# Patient Record
Sex: MALE | Race: WHITE | NOT HISPANIC OR LATINO | Employment: OTHER | URBAN - METROPOLITAN AREA
[De-identification: names, ages, dates, MRNs, and addresses within clinical notes are randomized per-mention and may not be internally consistent; named-entity substitution may affect disease eponyms.]

---

## 2018-05-11 LAB
ANION GAP SERPL CALCULATED.3IONS-SCNC: 13.7 MM/L
BASOPHILS # BLD AUTO: 0 X3/UL (ref 0–0.3)
BASOPHILS # BLD AUTO: 0.6 % (ref 0–2)
BUN SERPL-MCNC: 17 MG/DL (ref 7–25)
CALCIUM SERPL-MCNC: 9.2 MG/DL (ref 8.6–10.5)
CHLORIDE SERPL-SCNC: 103 MM/L (ref 98–107)
CHOLEST SERPL-MCNC: 169 MG/DL (ref 0–200)
CO2 SERPL-SCNC: 26 MM/L (ref 21–31)
CREAT SERPL-MCNC: 0.84 MG/DL (ref 0.7–1.3)
DEPRECATED RDW RBC AUTO: 13.9 %
EGFR (HISTORICAL): > 60 GFR
EGFR AFRICAN AMERICAN (HISTORICAL): > 60 GFR
EOSINOPHIL # BLD AUTO: 0.2 X3/UL (ref 0–0.5)
EOSINOPHIL NFR BLD AUTO: 3.5 % (ref 0–5)
GLUCOSE (HISTORICAL): 103 MG/DL (ref 65–99)
HCT VFR BLD AUTO: 46.2 % (ref 42–52)
HDLC SERPL-MCNC: 58 MG/DL (ref 40–60)
HGB BLD-MCNC: 15.3 G/DL (ref 14–18)
LDLC SERPL CALC-MCNC: 99.9 MG/DL (ref 75–193)
LYMPHOCYTES # BLD AUTO: 1.2 X3/UL (ref 1.2–4.2)
LYMPHOCYTES NFR BLD AUTO: 23.2 % (ref 20.5–51.1)
MCH RBC QN AUTO: 32.5 PG (ref 26–34)
MCHC RBC AUTO-ENTMCNC: 33.1 G/DL (ref 31–37)
MCV RBC AUTO: 98 FL (ref 81–99)
MONOCYTES # BLD AUTO: 0.6 X3/UL (ref 0–1)
MONOCYTES NFR BLD AUTO: 12.7 % (ref 1.7–12)
NEUTROPHILS # BLD AUTO: 3.1 X3/UL (ref 1.4–6.5)
NEUTS SEG NFR BLD AUTO: 60 % (ref 42.2–75.2)
OSMOLALITY, SERUM (HISTORICAL): 277 MOSM (ref 262–291)
PLATELET # BLD AUTO: 299 X3/UL (ref 130–400)
PMV BLD AUTO: 8.1 FL
POTASSIUM SERPL-SCNC: 4.7 MM/L (ref 3.5–5.5)
RBC # BLD AUTO: 4.71 X6/UL (ref 4.3–5.9)
SODIUM SERPL-SCNC: 138 MM/L (ref 134–143)
TRIGL SERPL-MCNC: 57 MG/DL (ref 44–166)
VLDL CHOLESTEROL (HISTORICAL): 11 MG/DL (ref 5–51)
WBC # BLD AUTO: 5.1 X3/UL (ref 4.8–10.8)

## 2018-07-12 PROBLEM — R03.0 BORDERLINE HYPERTENSION: Status: ACTIVE | Noted: 2018-07-12

## 2018-07-12 PROBLEM — E78.5 HYPERLIPIDEMIA: Status: ACTIVE | Noted: 2018-07-12

## 2018-07-12 RX ORDER — SIMVASTATIN 20 MG
20 TABLET ORAL DAILY
COMMUNITY
End: 2021-01-27

## 2018-07-12 RX ORDER — MULTIVITAMIN
TABLET ORAL DAILY
COMMUNITY

## 2018-08-28 ENCOUNTER — OFFICE VISIT (OUTPATIENT)
Dept: FAMILY MEDICINE CLINIC | Facility: CLINIC | Age: 78
End: 2018-08-28
Payer: MEDICARE

## 2018-08-28 VITALS
RESPIRATION RATE: 16 BRPM | OXYGEN SATURATION: 99 % | SYSTOLIC BLOOD PRESSURE: 132 MMHG | BODY MASS INDEX: 26.74 KG/M2 | TEMPERATURE: 98.7 F | HEART RATE: 69 BPM | WEIGHT: 191 LBS | HEIGHT: 71 IN | DIASTOLIC BLOOD PRESSURE: 64 MMHG

## 2018-08-28 DIAGNOSIS — E78.5 HYPERLIPIDEMIA, UNSPECIFIED HYPERLIPIDEMIA TYPE: ICD-10-CM

## 2018-08-28 DIAGNOSIS — R03.0 BORDERLINE HYPERTENSION: Primary | ICD-10-CM

## 2018-08-28 PROCEDURE — 99213 OFFICE O/P EST LOW 20 MIN: CPT | Performed by: INTERNAL MEDICINE

## 2018-08-28 NOTE — PROGRESS NOTES
Assessment/Plan:  Hyperlipidemia on statin therapy  2   Borderline hypertension stable on no medication  3   Hearing deficit  Plan  Continue Zocor 20 once daily and multivitamins  Check lab data before next visit in 3         Diagnoses and all orders for this visit:    Borderline hypertension    Hyperlipidemia, unspecified hyperlipidemia type          Subjective:      Patient ID: Chandana Lao is a 77 y o  male  59-year-old male with history of borderline hypertension on no medications, history of hyperlipidemia on statin therapy  Patient comes for routine scheduled visit has no new complaints at this time  Denies any chest pain dyspnea palpitation no nausea no vomiting  The following portions of the patient's history were reviewed and updated as appropriate: He  has a past medical history of Hyperlipidemia  Patient Active Problem List    Diagnosis Date Noted    Hyperlipidemia 07/12/2018    Borderline hypertension 07/12/2018     He  has a past surgical history that includes Hernia repair  His family history is not on file  He  reports that he has quit smoking  He has never used smokeless tobacco  He reports that he drinks alcohol  He reports that he does not use drugs  Current Outpatient Prescriptions   Medication Sig Dispense Refill    Multiple Vitamin (MULTIVITAMIN) tablet Take by mouth daily      simvastatin (ZOCOR) 20 mg tablet Take 20 mg by mouth daily       No current facility-administered medications for this visit  Current Outpatient Prescriptions on File Prior to Visit   Medication Sig    Multiple Vitamin (MULTIVITAMIN) tablet Take by mouth daily    simvastatin (ZOCOR) 20 mg tablet Take 20 mg by mouth daily     No current facility-administered medications on file prior to visit  He has No Known Allergies       Review of Systems   Constitutional: Negative  HENT: Negative  Eyes: Negative  Respiratory: Negative  Cardiovascular: Negative      Gastrointestinal: Negative  Endocrine: Negative  Genitourinary: Negative  Musculoskeletal: Negative  Skin: Negative  Allergic/Immunologic: Negative  Neurological: Negative  Hematological: Negative  Psychiatric/Behavioral: Negative  Objective:      /64   Pulse 69   Temp 98 7 °F (37 1 °C) (Tympanic)   Resp 16   Ht 5' 11" (1 803 m)   Wt 86 6 kg (191 lb)   SpO2 99%   BMI 26 64 kg/m²          Physical Exam   Constitutional: He is oriented to person, place, and time  He appears well-developed and well-nourished  HENT:   Head: Normocephalic  Eyes: Pupils are equal, round, and reactive to light  Neck: Normal range of motion  Neck supple  No JVD present  No tracheal deviation present  No thyromegaly present  Cardiovascular: Normal rate, regular rhythm, normal heart sounds and intact distal pulses  Exam reveals no gallop and no friction rub  No murmur heard  Pulmonary/Chest: Effort normal and breath sounds normal    Abdominal: Soft  Bowel sounds are normal    Musculoskeletal: Normal range of motion  Neurological: He is alert and oriented to person, place, and time  Skin: Skin is warm           Orders Only on 05/11/2018   Component Date Value Ref Range Status    WBC 05/11/2018 5 1  4 8 - 10 8 X3/UL Final    RBC 05/11/2018 4 71  4 3 - 5 9 X6/UL Final    Hemoglobin 05/11/2018 15 3  14 0 - 18 0 G/DL Final    Hematocrit 05/11/2018 46 2  42 - 52 % Final    MCV 05/11/2018 98 0  81 - 99 FL Final    MCH 05/11/2018 32 5  26 - 34 PG Final    MCHC 05/11/2018 33 1  31 - 37 G/DL Final    RDW 05/11/2018 13 9  % Final    Platelets 28/86/4141 299  130 - 400 X3/UL Final    MPV 05/11/2018 8 1  FL Final    Neutrophils Relative 05/11/2018 60 0  42 2 - 75 2 % Final    Lymphocytes Relative 05/11/2018 23 2  20 5 - 51 1 % Final    Monocytes Relative 05/11/2018 12 7* 1 7 - 12 0 % Final    Eosinophils Relative 05/11/2018 3 5  0 0 - 5 0 % Final    Basophils Relative 05/11/2018 0 6  0 0 - 2 0 % Final    Neutrophils Absolute 05/11/2018 3 1  1 4 - 6 5 X3/UL Final    Lymphocytes Absolute 05/11/2018 1 2  1 2 - 4 2 X3/UL Final    Monocytes Absolute 05/11/2018 0 6  0 0 - 1 0 X3/UL Final    Eosinophils Absolute 05/11/2018 0 2  0 0 - 0 5 X3/UL Final    Basophils Absolute 05/11/2018 0 0  0 0 - 0 3 X3/UL Final    Glucose 05/11/2018 103* 65 - 99 MG/DL Final    Comment: ADA fasting glucose recommendations:   Normal: 65-99; Impaired: 100-125;  Diagnostic for Diabetes mellitus: > 125; Target for Diabetes mellitus:       BUN 05/11/2018 17  7 - 25 MG/DL Final    Creatinine 05/11/2018 0 84  0 7 - 1 3 MG/DL Final    Comment: IDMS method performed  The drugs Metamizole, Sulfasalazine, and Sulfapyridine may interfere and  result in false low results   Sodium 05/11/2018 138  134 - 143 MM/L Final    Please note: Reference range change based on patient population   Potassium 05/11/2018 4 7  3 5 - 5 5 MM/L Final    Please note: Reference range change based on patient population   Chloride 05/11/2018 103  98 - 107 MM/L Final    CO2 05/11/2018 26  21 0 - 31 0 MM/L Final    Calcium 05/11/2018 9 2  8 6 - 10 5 MG/DL Final    Please note: Reference range change based on patient population   Anion Gap 05/11/2018 13 7  MM/L Final    OSMOLALITY, SERUM 05/11/2018 277  262 - 291 mOsm Final    EGFR (HISTORICAL) 05/11/2018 > 60  >60 GFR Final    Comment: This calculation follows the MDRD guidelines  Units are in mL/min/1 73 sq meters      eGFR African American 05/11/2018 > 60  >60 GFR Final    Cholesterol 05/11/2018 169  0 - 200 MG/DL Final    Comment: <200----------------------------DESIRABLE  200 - 239---------------------BORDERLINE HIGH  240 OR GREATER-----HIGH  The drugs Metamizole, Sulfasalazine, and Sulfapyridine may interfere and  result in false low results        Triglycerides 05/11/2018 57  44 - 166 MG/DL Final Comment: The drugs Metamizole, Sulfasalazine, and Sulfapyridine may interfere and  result in false low results   VLDL CHOLESTEROL 05/11/2018 11 0  5 - 51 MG/DL Final    HDL 05/11/2018 58  40 - 60 MG/DL Final    Comment: The drugs Metamizole, Sulfasalazine, and Sulfapyridine may interfere and  result in false low results   LDL Calculated 05/11/2018 99 9  75 - 193 MG/DL Final       No results found

## 2018-09-20 ENCOUNTER — CLINICAL SUPPORT (OUTPATIENT)
Dept: FAMILY MEDICINE CLINIC | Facility: CLINIC | Age: 78
End: 2018-09-20
Payer: MEDICARE

## 2018-09-20 DIAGNOSIS — Z23 NEEDS FLU SHOT: Primary | ICD-10-CM

## 2018-09-20 PROCEDURE — 90662 IIV NO PRSV INCREASED AG IM: CPT

## 2018-09-20 PROCEDURE — G0008 ADMIN INFLUENZA VIRUS VAC: HCPCS

## 2018-12-20 ENCOUNTER — OFFICE VISIT (OUTPATIENT)
Dept: FAMILY MEDICINE CLINIC | Facility: CLINIC | Age: 78
End: 2018-12-20
Payer: MEDICARE

## 2018-12-20 VITALS
HEART RATE: 78 BPM | HEIGHT: 71 IN | OXYGEN SATURATION: 99 % | WEIGHT: 192 LBS | SYSTOLIC BLOOD PRESSURE: 120 MMHG | RESPIRATION RATE: 16 BRPM | DIASTOLIC BLOOD PRESSURE: 80 MMHG | BODY MASS INDEX: 26.88 KG/M2 | TEMPERATURE: 98 F

## 2018-12-20 DIAGNOSIS — J01.00 ACUTE MAXILLARY SINUSITIS, RECURRENCE NOT SPECIFIED: Primary | ICD-10-CM

## 2018-12-20 DIAGNOSIS — E66.3 OVERWEIGHT (BMI 25.0-29.9): ICD-10-CM

## 2018-12-20 PROCEDURE — 99213 OFFICE O/P EST LOW 20 MIN: CPT | Performed by: NURSE PRACTITIONER

## 2018-12-20 RX ORDER — DEXTROMETHORPHAN HYDROBROMIDE AND PROMETHAZINE HYDROCHLORIDE 15; 6.25 MG/5ML; MG/5ML
5 SYRUP ORAL 4 TIMES DAILY PRN
Qty: 240 ML | Refills: 0 | Status: SHIPPED | OUTPATIENT
Start: 2018-12-20 | End: 2019-01-15

## 2018-12-20 RX ORDER — AZITHROMYCIN 250 MG/1
TABLET, FILM COATED ORAL
Qty: 6 TABLET | Refills: 0 | Status: SHIPPED | OUTPATIENT
Start: 2018-12-20 | End: 2018-12-24

## 2018-12-20 NOTE — PATIENT INSTRUCTIONS
Discussed viral vs bacterial infection  Zithromax and Promethazine DM as directed  Call or return for problems/concerns

## 2018-12-20 NOTE — PROGRESS NOTES
[facility]        NAME: Sheldon Nunez is a 77 y o  male  : 1940    MRN: [id number]  DATE: 2019  TIME: 12:22 PM    Assessment and Plan   Acute maxillary sinusitis, recurrence not specified [J01 00]  1  Acute maxillary sinusitis, recurrence not specified  azithromycin (ZITHROMAX) 250 mg tablet    DISCONTINUED: promethazine-dextromethorphan (PHENERGAN-DM) 6 25-15 mg/5 mL oral syrup   2  Overweight (BMI 25 0-29  9)           Patient Instructions     Patient Instructions   Discussed viral vs bacterial infection  Zithromax and Promethazine DM as directed  Call or return for problems/concerns              Chief Complaint     Chief Complaint   Patient presents with    Cold Like Symptoms         History of Present Illness       Cold symptoms- sinus and chest congestion, denies ear pain, sore throat, and fever  Symptoms x 1 week, now worse        Review of Systems   Review of Systems   Constitutional: Negative for activity change, chills, fatigue and fever  HENT: Positive for congestion, postnasal drip, rhinorrhea and sinus pressure  Negative for ear pain and sore throat  Eyes: Negative for pain, discharge and redness  Respiratory: Positive for cough  Negative for wheezing  Cardiovascular: Negative for chest pain  Gastrointestinal: Negative for constipation, diarrhea, nausea and vomiting  Musculoskeletal: Negative for myalgias  Skin: Negative for rash  Neurological: Positive for dizziness  Negative for headaches           Current Medications       Current Outpatient Medications:     ciprofloxacin-dexamethasone (CIPRODEX) otic suspension, Administer 4 drops to the right ear 2 (two) times a day for 7 days, Disp: 3 mL, Rfl: 0    Multiple Vitamin (MULTIVITAMIN) tablet, Take by mouth daily, Disp: , Rfl:     Multiple Vitamins-Minerals (PRESERVISION AREDS 2 PO), Take by mouth, Disp: , Rfl:     simvastatin (ZOCOR) 20 mg tablet, Take 20 mg by mouth daily, Disp: , Rfl: Current Allergies     Allergies as of 12/20/2018    (No Known Allergies)            The following portions of the patient's history were reviewed and updated as appropriate: allergies, current medications, past family history, past medical history, past social history, past surgical history and problem list      Past Medical History:   Diagnosis Date    Encounter for general adult medical examination without abnormal findings 3/20/2019    Hyperlipidemia        Past Surgical History:   Procedure Laterality Date    HERNIA REPAIR         History reviewed  No pertinent family history  Medications have been verified  Objective   /80 (BP Location: Right arm, Patient Position: Sitting, Cuff Size: Adult)   Pulse 78   Temp 98 °F (36 7 °C) (Tympanic)   Resp 16   Ht 5' 11" (1 803 m)   Wt 87 1 kg (192 lb)   SpO2 99%   BMI 26 78 kg/m²        Physical Exam     Physical Exam   Constitutional: He is oriented to person, place, and time  He appears well-developed and well-nourished  No distress  HENT:   Head: Normocephalic and atraumatic  Right Ear: Tympanic membrane, external ear and ear canal normal    Left Ear: Tympanic membrane, external ear and ear canal normal    Nose: Rhinorrhea present  Mouth/Throat: Uvula is midline, oropharynx is clear and moist and mucous membranes are normal  No oropharyngeal exudate  Eyes: Pupils are equal, round, and reactive to light  Conjunctivae and EOM are normal  Right eye exhibits no discharge  Left eye exhibits no discharge  Cardiovascular: Normal rate, regular rhythm and normal heart sounds  No murmur heard  Pulmonary/Chest: Effort normal and breath sounds normal  No respiratory distress  He has no wheezes  Musculoskeletal: Normal range of motion  Neurological: He is alert and oriented to person, place, and time  Skin: Skin is warm and dry  He is not diaphoretic  Psychiatric: He has a normal mood and affect   His behavior is normal  Judgment and thought content normal    Nursing note and vitals reviewed  BMI Counseling: Body mass index is 26 78 kg/m²  Discussed the patient's BMI with him  The BMI is above average  No BMI follow-up plan is appropriate  Patient is 71 years old and weight reduction/weight gain would further complicate their underlying physical disability

## 2019-01-03 ENCOUNTER — APPOINTMENT (OUTPATIENT)
Dept: LAB | Facility: HOSPITAL | Age: 79
End: 2019-01-03
Attending: INTERNAL MEDICINE
Payer: MEDICARE

## 2019-01-03 ENCOUNTER — TRANSCRIBE ORDERS (OUTPATIENT)
Dept: ADMINISTRATIVE | Facility: HOSPITAL | Age: 79
End: 2019-01-03

## 2019-01-03 DIAGNOSIS — E78.5 HYPERLIPIDEMIA, UNSPECIFIED HYPERLIPIDEMIA TYPE: ICD-10-CM

## 2019-01-03 LAB
ALBUMIN SERPL BCP-MCNC: 4.1 G/DL (ref 3.5–5.7)
ALP SERPL-CCNC: 72 U/L (ref 55–165)
ALT SERPL W P-5'-P-CCNC: 20 U/L (ref 7–52)
ANION GAP SERPL CALCULATED.3IONS-SCNC: 9 MMOL/L (ref 4–13)
AST SERPL W P-5'-P-CCNC: 18 U/L (ref 13–39)
BASOPHILS # BLD AUTO: 0 THOUSANDS/ΜL (ref 0–0.1)
BASOPHILS NFR BLD AUTO: 1 % (ref 0–1)
BILIRUB SERPL-MCNC: 0.6 MG/DL (ref 0.2–1)
BUN SERPL-MCNC: 18 MG/DL (ref 7–25)
CALCIUM SERPL-MCNC: 9.1 MG/DL (ref 8.6–10.5)
CHLORIDE SERPL-SCNC: 104 MMOL/L (ref 98–107)
CHOLEST SERPL-MCNC: 190 MG/DL (ref 0–200)
CO2 SERPL-SCNC: 26 MMOL/L (ref 21–31)
CREAT SERPL-MCNC: 0.87 MG/DL (ref 0.7–1.3)
EOSINOPHIL # BLD AUTO: 0.2 THOUSAND/ΜL (ref 0–0.61)
EOSINOPHIL NFR BLD AUTO: 3 % (ref 0–6)
ERYTHROCYTE [DISTWIDTH] IN BLOOD BY AUTOMATED COUNT: 13.4 % (ref 11.6–15.1)
GFR SERPL CREATININE-BSD FRML MDRD: 82 ML/MIN/1.73SQ M
GLUCOSE P FAST SERPL-MCNC: 105 MG/DL (ref 65–99)
HCT VFR BLD AUTO: 45.8 % (ref 42–52)
HDLC SERPL-MCNC: 59 MG/DL (ref 40–60)
HGB BLD-MCNC: 15.4 G/DL (ref 12–17)
LDLC SERPL CALC-MCNC: 120 MG/DL (ref 0–100)
LYMPHOCYTES # BLD AUTO: 1.2 THOUSANDS/ΜL (ref 0.6–4.47)
LYMPHOCYTES NFR BLD AUTO: 22 % (ref 14–44)
MCH RBC QN AUTO: 32.7 PG (ref 26.8–34.3)
MCHC RBC AUTO-ENTMCNC: 33.7 G/DL (ref 31.4–37.4)
MCV RBC AUTO: 97 FL (ref 82–98)
MONOCYTES # BLD AUTO: 0.6 THOUSAND/ΜL (ref 0.17–1.22)
MONOCYTES NFR BLD AUTO: 12 % (ref 4–12)
NEUTROPHILS # BLD AUTO: 3.5 THOUSANDS/ΜL (ref 1.85–7.62)
NEUTS SEG NFR BLD AUTO: 63 % (ref 43–75)
NONHDLC SERPL-MCNC: 131 MG/DL
NRBC BLD AUTO-RTO: 0 /100 WBCS
PLATELET # BLD AUTO: 326 THOUSANDS/UL (ref 149–390)
PMV BLD AUTO: 7.8 FL (ref 8.9–12.7)
POTASSIUM SERPL-SCNC: 4.5 MMOL/L (ref 3.5–5.5)
PROT SERPL-MCNC: 7 G/DL (ref 6.4–8.9)
RBC # BLD AUTO: 4.72 MILLION/UL (ref 3.88–5.62)
SODIUM SERPL-SCNC: 139 MMOL/L (ref 134–143)
TRIGL SERPL-MCNC: 57 MG/DL (ref 44–166)
WBC # BLD AUTO: 5.6 THOUSAND/UL (ref 4.31–10.16)

## 2019-01-03 PROCEDURE — 80053 COMPREHEN METABOLIC PANEL: CPT

## 2019-01-03 PROCEDURE — 80061 LIPID PANEL: CPT

## 2019-01-03 PROCEDURE — 85025 COMPLETE CBC W/AUTO DIFF WBC: CPT

## 2019-01-03 PROCEDURE — 36415 COLL VENOUS BLD VENIPUNCTURE: CPT

## 2019-01-07 ENCOUNTER — OFFICE VISIT (OUTPATIENT)
Dept: FAMILY MEDICINE CLINIC | Facility: CLINIC | Age: 79
End: 2019-01-07
Payer: MEDICARE

## 2019-01-07 VITALS
RESPIRATION RATE: 18 BRPM | TEMPERATURE: 98.7 F | DIASTOLIC BLOOD PRESSURE: 76 MMHG | SYSTOLIC BLOOD PRESSURE: 132 MMHG | OXYGEN SATURATION: 98 % | BODY MASS INDEX: 25.34 KG/M2 | WEIGHT: 181 LBS | HEART RATE: 74 BPM | HEIGHT: 71 IN

## 2019-01-07 DIAGNOSIS — R03.0 BORDERLINE HYPERTENSION: ICD-10-CM

## 2019-01-07 DIAGNOSIS — E78.5 HYPERLIPIDEMIA, UNSPECIFIED HYPERLIPIDEMIA TYPE: Primary | ICD-10-CM

## 2019-01-07 DIAGNOSIS — J01.00 ACUTE NON-RECURRENT MAXILLARY SINUSITIS: ICD-10-CM

## 2019-01-07 PROCEDURE — 99213 OFFICE O/P EST LOW 20 MIN: CPT | Performed by: INTERNAL MEDICINE

## 2019-01-07 NOTE — PROGRESS NOTES
Assessment/Plan:  Acute sinusitis resolved  2   Hyperlipidemia on statin therapy recent cholesterol 190 with HDL of 59   CBC Chem profile is satisfactory  Blood sugar 105  Plan patient to continue current med medication with Zocor  Recheck and follow-up scheduled for 3 months         Diagnoses and all orders for this visit:    Hyperlipidemia, unspecified hyperlipidemia type    Borderline hypertension    Acute non-recurrent maxillary sinusitis          Subjective:      Patient ID: Sheldon Nunez is a 77 y o  male  78-year-old male comes for a routine follow-up  Patient recently treated for an acute episode of maxillary sinusitis  Patient responded well to therapy with Zithromax  He has mild residual nasal congestion no fever no chills no cough  Medical history significant for hyperlipidemia on statin therapy and a borderline hypertension  Blood pressure today is satisfactory        The following portions of the patient's history were reviewed and updated as appropriate: He  has a past medical history of Hyperlipidemia  Patient Active Problem List    Diagnosis Date Noted    Hyperlipidemia 07/12/2018    Borderline hypertension 07/12/2018     He  has a past surgical history that includes Hernia repair  His family history is not on file  He  reports that he has quit smoking  He has never used smokeless tobacco  He reports that he drinks alcohol  He reports that he does not use drugs  Current Outpatient Prescriptions   Medication Sig Dispense Refill    Multiple Vitamin (MULTIVITAMIN) tablet Take by mouth daily      promethazine-dextromethorphan (PHENERGAN-DM) 6 25-15 mg/5 mL oral syrup Take 5 mL by mouth 4 (four) times a day as needed for cough 240 mL 0    simvastatin (ZOCOR) 20 mg tablet Take 20 mg by mouth daily       No current facility-administered medications for this visit        Current Outpatient Prescriptions on File Prior to Visit   Medication Sig    Multiple Vitamin (MULTIVITAMIN) tablet Take by mouth daily    promethazine-dextromethorphan (PHENERGAN-DM) 6 25-15 mg/5 mL oral syrup Take 5 mL by mouth 4 (four) times a day as needed for cough    simvastatin (ZOCOR) 20 mg tablet Take 20 mg by mouth daily     No current facility-administered medications on file prior to visit  He has No Known Allergies       Review of Systems   Constitutional: Negative  HENT: Positive for postnasal drip  Eyes: Negative  Respiratory: Negative  Cardiovascular: Negative  Gastrointestinal: Negative  Endocrine: Negative  Genitourinary: Negative  Musculoskeletal: Negative  Skin: Negative  Allergic/Immunologic: Negative  Neurological: Negative  Hematological: Negative  Psychiatric/Behavioral: Negative  Objective:      /76   Pulse 74   Temp 98 7 °F (37 1 °C) (Tympanic)   Resp 18   Ht 5' 11" (1 803 m)   Wt 82 1 kg (181 lb)   SpO2 98%   BMI 25 24 kg/m²          Physical Exam   Constitutional: He is oriented to person, place, and time  He appears well-developed and well-nourished  HENT:   Head: Normocephalic  Eyes: Pupils are equal, round, and reactive to light  Neck: Normal range of motion  Neck supple  No JVD present  No tracheal deviation present  No thyromegaly present  Cardiovascular: Normal rate, regular rhythm, normal heart sounds and intact distal pulses  Exam reveals no gallop and no friction rub  No murmur heard  Pulmonary/Chest: Effort normal and breath sounds normal    Abdominal: Soft  Bowel sounds are normal    Musculoskeletal: Normal range of motion  Neurological: He is alert and oriented to person, place, and time  Skin: Skin is warm           Appointment on 01/03/2019   Component Date Value Ref Range Status    Cholesterol 01/03/2019 190  0 - 200 mg/dL Final      Cholesterol:       Desirable         <200 mg/dl       Borderline         200-239 mg/dl       High              >239           Triglycerides 01/03/2019 57  44 - 166 mg/dL Final      Triglyceride:     Normal          <150 mg/dl     Borderline High 150-199 mg/dl     High            200-499 mg/dl        Very High       >499 mg/dl    Specimen collection should occur prior to N-Acetylcysteine or Metamizole administration due to the potential for falsely depressed results   HDL, Direct 01/03/2019 59  40 - 60 mg/dL Final      HDL Cholesterol:       High    >60 mg/dL       Low     <41 mg/dL  Specimen collection should occur prior to Metamizole administration due to the potential for falsley depressed results   LDL Calculated 01/03/2019 120* 0 - 100 mg/dL Final      LDL Cholesterol:     Optimal           <100 mg/dl     Near Optimal      100-129 mg/dl     Above Optimal       Borderline High 130-159 mg/dl       High            160-189 mg/dl       Very High       >189 mg/dl         This screening LDL is a calculated result  It does not have the accuracy of the Direct Measured LDL in the monitoring of patients with hyperlipidemia and/or statin therapy  Direct Measure LDL (THG696) must be ordered separately in these patients   Non-HDL-Chol (CHOL-HDL) 01/03/2019 131  mg/dl Final    Sodium 01/03/2019 139  134 - 143 mmol/L Final    Potassium 01/03/2019 4 5  3 5 - 5 5 mmol/L Final    Chloride 01/03/2019 104  98 - 107 mmol/L Final    CO2 01/03/2019 26  21 - 31 mmol/L Final    ANION GAP 01/03/2019 9  4 - 13 mmol/L Final    BUN 01/03/2019 18  7 - 25 mg/dL Final    Creatinine 01/03/2019 0 87  0 70 - 1 30 mg/dL Final    Standardized to IDMS reference method    Glucose, Fasting 01/03/2019 105* 65 - 99 mg/dL Final      Specimen collection should occur prior to Sulfasalazine administration due to the potential for falsely depressed results  Specimen collection should occur prior to Sulfapyridine administration due to the potential for falsely elevated results      Calcium 01/03/2019 9 1  8 6 - 10 5 mg/dL Final    AST 01/03/2019 18  13 - 39 U/L Final      Specimen collection should occur prior to Sulfasalazine administration due to the potential for falsely depressed results   ALT 01/03/2019 20  7 - 52 U/L Final      Specimen collection should occur prior to Sulfasalazine administration due to the potential for falsely depressed results   Alkaline Phosphatase 01/03/2019 72  55 - 165 U/L Final    Total Protein 01/03/2019 7 0  6 4 - 8 9 g/dL Final    Albumin 01/03/2019 4 1  3 5 - 5 7 g/dL Final    Total Bilirubin 01/03/2019 0 60  0 20 - 1 00 mg/dL Final    eGFR 01/03/2019 82  ml/min/1 73sq m Final    WBC 01/03/2019 5 60  4 31 - 10 16 Thousand/uL Final    RBC 01/03/2019 4 72  3 88 - 5 62 Million/uL Final    Hemoglobin 01/03/2019 15 4  12 0 - 17 0 g/dL Final    Hematocrit 01/03/2019 45 8  42 0 - 52 0 % Final    MCV 01/03/2019 97  82 - 98 fL Final    MCH 01/03/2019 32 7  26 8 - 34 3 pg Final    MCHC 01/03/2019 33 7  31 4 - 37 4 g/dL Final    RDW 01/03/2019 13 4  11 6 - 15 1 % Final    MPV 01/03/2019 7 8* 8 9 - 12 7 fL Final    Platelets 67/06/8462 326  149 - 390 Thousands/uL Final    nRBC 01/03/2019 0  /100 WBCs Final    Neutrophils Relative 01/03/2019 63  43 - 75 % Final    Lymphocytes Relative 01/03/2019 22  14 - 44 % Final    Monocytes Relative 01/03/2019 12  4 - 12 % Final    Eosinophils Relative 01/03/2019 3  0 - 6 % Final    Basophils Relative 01/03/2019 1  0 - 1 % Final    Neutrophils Absolute 01/03/2019 3 50  1 85 - 7 62 Thousands/µL Final    Lymphocytes Absolute 01/03/2019 1 20  0 60 - 4 47 Thousands/µL Final    Monocytes Absolute 01/03/2019 0 60  0 17 - 1 22 Thousand/µL Final    Eosinophils Absolute 01/03/2019 0 20  0 00 - 0 61 Thousand/µL Final    Basophils Absolute 01/03/2019 0 00  0 00 - 0 10 Thousands/µL Final       No results found

## 2019-01-15 ENCOUNTER — APPOINTMENT (OUTPATIENT)
Dept: LAB | Facility: HOSPITAL | Age: 79
End: 2019-01-15
Attending: INTERNAL MEDICINE
Payer: MEDICARE

## 2019-01-15 ENCOUNTER — OFFICE VISIT (OUTPATIENT)
Dept: FAMILY MEDICINE CLINIC | Facility: CLINIC | Age: 79
End: 2019-01-15
Payer: MEDICARE

## 2019-01-15 ENCOUNTER — TRANSCRIBE ORDERS (OUTPATIENT)
Dept: ADMINISTRATIVE | Facility: HOSPITAL | Age: 79
End: 2019-01-15

## 2019-01-15 VITALS
OXYGEN SATURATION: 99 % | RESPIRATION RATE: 16 BRPM | BODY MASS INDEX: 27.86 KG/M2 | SYSTOLIC BLOOD PRESSURE: 138 MMHG | DIASTOLIC BLOOD PRESSURE: 78 MMHG | TEMPERATURE: 98.4 F | WEIGHT: 199 LBS | HEART RATE: 89 BPM | HEIGHT: 71 IN

## 2019-01-15 DIAGNOSIS — N50.819 TESTICLE PAIN: Primary | ICD-10-CM

## 2019-01-15 DIAGNOSIS — N50.819 TESTICLE PAIN: ICD-10-CM

## 2019-01-15 LAB
BILIRUB UR QL STRIP: NEGATIVE
CLARITY UR: CLEAR
COLOR UR: YELLOW
GLUCOSE UR STRIP-MCNC: NEGATIVE MG/DL
HGB UR QL STRIP.AUTO: NEGATIVE
KETONES UR STRIP-MCNC: NEGATIVE MG/DL
LEUKOCYTE ESTERASE UR QL STRIP: NEGATIVE
NITRITE UR QL STRIP: NEGATIVE
PH UR STRIP.AUTO: 7 [PH] (ref 5–8)
PROT UR STRIP-MCNC: NEGATIVE MG/DL
PSA SERPL-MCNC: 0.5 NG/ML (ref 0–4)
SP GR UR STRIP.AUTO: 1.02 (ref 1–1.03)
UROBILINOGEN UR QL STRIP.AUTO: 0.2 E.U./DL

## 2019-01-15 PROCEDURE — 84153 ASSAY OF PSA TOTAL: CPT

## 2019-01-15 PROCEDURE — 99213 OFFICE O/P EST LOW 20 MIN: CPT | Performed by: INTERNAL MEDICINE

## 2019-01-15 PROCEDURE — 81003 URINALYSIS AUTO W/O SCOPE: CPT

## 2019-01-15 NOTE — PROGRESS NOTES
Assessment/Plan:  Patient comes in today with new complaint of left testicular pain for the last several weeks  Patient examination does not reveal any hydrocele or hernia  I decided to do a urology referral a including ultrasound of the testicles a urinalysis to rule out UTI and a PSA test patient was referred to Dr Angel Merchant         Diagnoses and all orders for this visit:    Testicle pain          Subjective:      Patient ID: Mirza Cooney is a 77 y o  male  69-year-old male comes in with a new complaint of left-sided testicular swelling with pain  Apparently the problem has been persistent for a while but patient did not describe it to me on his last visit  Pain is localized to the left side of the testicle  The testicle is slightly tender on touch  No fever  No history of any trauma        The following portions of the patient's history were reviewed and updated as appropriate: He  has a past medical history of Hyperlipidemia  Patient Active Problem List    Diagnosis Date Noted    Hyperlipidemia 07/12/2018    Borderline hypertension 07/12/2018     He  has a past surgical history that includes Hernia repair  His family history is not on file  He  reports that he has quit smoking  He has never used smokeless tobacco  He reports that he drinks alcohol  He reports that he does not use drugs  Current Outpatient Prescriptions   Medication Sig Dispense Refill    Multiple Vitamin (MULTIVITAMIN) tablet Take by mouth daily      simvastatin (ZOCOR) 20 mg tablet Take 20 mg by mouth daily       No current facility-administered medications for this visit        Current Outpatient Prescriptions on File Prior to Visit   Medication Sig    Multiple Vitamin (MULTIVITAMIN) tablet Take by mouth daily    simvastatin (ZOCOR) 20 mg tablet Take 20 mg by mouth daily    [DISCONTINUED] promethazine-dextromethorphan (PHENERGAN-DM) 6 25-15 mg/5 mL oral syrup Take 5 mL by mouth 4 (four) times a day as needed for cough (Patient not taking: Reported on 1/15/2019 )     No current facility-administered medications on file prior to visit  He has No Known Allergies       Review of Systems   Constitutional: Negative  HENT: Negative  Eyes: Negative  Respiratory: Negative  Cardiovascular: Negative  Gastrointestinal: Negative  Endocrine: Negative  Genitourinary: Negative  Musculoskeletal: Negative  Skin: Negative  Allergic/Immunologic: Negative  Neurological: Negative  Hematological: Negative  Psychiatric/Behavioral: Negative  Objective:      /78 (BP Location: Right arm, Patient Position: Sitting, Cuff Size: Adult)   Pulse 89   Temp 98 4 °F (36 9 °C) (Tympanic)   Resp 16   Ht 5' 11" (1 803 m)   Wt 90 3 kg (199 lb)   SpO2 99%   BMI 27 75 kg/m²          Physical Exam   Constitutional: He is oriented to person, place, and time  He appears well-developed and well-nourished  HENT:   Head: Normocephalic  Eyes: Pupils are equal, round, and reactive to light  Neck: Normal range of motion  Neck supple  No JVD present  No tracheal deviation present  No thyromegaly present  Cardiovascular: Normal rate, regular rhythm, normal heart sounds and intact distal pulses  Exam reveals no gallop and no friction rub  No murmur heard  Pulmonary/Chest: Effort normal and breath sounds normal    Abdominal: Soft  Bowel sounds are normal    Genitourinary: Penis normal    Genitourinary Comments: Examination of the both testicles revealed no palpable lump or mass  Left testicle was slightly tender to touch  No hydrocele no hernia noted   Musculoskeletal: Normal range of motion  Neurological: He is alert and oriented to person, place, and time  Skin: Skin is warm           Appointment on 01/03/2019   Component Date Value Ref Range Status    Cholesterol 01/03/2019 190  0 - 200 mg/dL Final      Cholesterol:       Desirable         <200 mg/dl       Borderline         200-239 mg/dl       High              >239           Triglycerides 01/03/2019 57  44 - 166 mg/dL Final      Triglyceride:     Normal          <150 mg/dl     Borderline High 150-199 mg/dl     High            200-499 mg/dl        Very High       >499 mg/dl    Specimen collection should occur prior to N-Acetylcysteine or Metamizole administration due to the potential for falsely depressed results   HDL, Direct 01/03/2019 59  40 - 60 mg/dL Final      HDL Cholesterol:       High    >60 mg/dL       Low     <41 mg/dL  Specimen collection should occur prior to Metamizole administration due to the potential for falsley depressed results   LDL Calculated 01/03/2019 120* 0 - 100 mg/dL Final      LDL Cholesterol:     Optimal           <100 mg/dl     Near Optimal      100-129 mg/dl     Above Optimal       Borderline High 130-159 mg/dl       High            160-189 mg/dl       Very High       >189 mg/dl         This screening LDL is a calculated result  It does not have the accuracy of the Direct Measured LDL in the monitoring of patients with hyperlipidemia and/or statin therapy  Direct Measure LDL (XUZ303) must be ordered separately in these patients   Non-HDL-Chol (CHOL-HDL) 01/03/2019 131  mg/dl Final    Sodium 01/03/2019 139  134 - 143 mmol/L Final    Potassium 01/03/2019 4 5  3 5 - 5 5 mmol/L Final    Chloride 01/03/2019 104  98 - 107 mmol/L Final    CO2 01/03/2019 26  21 - 31 mmol/L Final    ANION GAP 01/03/2019 9  4 - 13 mmol/L Final    BUN 01/03/2019 18  7 - 25 mg/dL Final    Creatinine 01/03/2019 0 87  0 70 - 1 30 mg/dL Final    Standardized to IDMS reference method    Glucose, Fasting 01/03/2019 105* 65 - 99 mg/dL Final      Specimen collection should occur prior to Sulfasalazine administration due to the potential for falsely depressed results  Specimen collection should occur prior to Sulfapyridine administration due to the potential for falsely elevated results      Calcium 01/03/2019 9 1  8 6 - 10 5 mg/dL Final  AST 01/03/2019 18  13 - 39 U/L Final      Specimen collection should occur prior to Sulfasalazine administration due to the potential for falsely depressed results   ALT 01/03/2019 20  7 - 52 U/L Final      Specimen collection should occur prior to Sulfasalazine administration due to the potential for falsely depressed results   Alkaline Phosphatase 01/03/2019 72  55 - 165 U/L Final    Total Protein 01/03/2019 7 0  6 4 - 8 9 g/dL Final    Albumin 01/03/2019 4 1  3 5 - 5 7 g/dL Final    Total Bilirubin 01/03/2019 0 60  0 20 - 1 00 mg/dL Final    eGFR 01/03/2019 82  ml/min/1 73sq m Final    WBC 01/03/2019 5 60  4 31 - 10 16 Thousand/uL Final    RBC 01/03/2019 4 72  3 88 - 5 62 Million/uL Final    Hemoglobin 01/03/2019 15 4  12 0 - 17 0 g/dL Final    Hematocrit 01/03/2019 45 8  42 0 - 52 0 % Final    MCV 01/03/2019 97  82 - 98 fL Final    MCH 01/03/2019 32 7  26 8 - 34 3 pg Final    MCHC 01/03/2019 33 7  31 4 - 37 4 g/dL Final    RDW 01/03/2019 13 4  11 6 - 15 1 % Final    MPV 01/03/2019 7 8* 8 9 - 12 7 fL Final    Platelets 37/64/5780 326  149 - 390 Thousands/uL Final    nRBC 01/03/2019 0  /100 WBCs Final    Neutrophils Relative 01/03/2019 63  43 - 75 % Final    Lymphocytes Relative 01/03/2019 22  14 - 44 % Final    Monocytes Relative 01/03/2019 12  4 - 12 % Final    Eosinophils Relative 01/03/2019 3  0 - 6 % Final    Basophils Relative 01/03/2019 1  0 - 1 % Final    Neutrophils Absolute 01/03/2019 3 50  1 85 - 7 62 Thousands/µL Final    Lymphocytes Absolute 01/03/2019 1 20  0 60 - 4 47 Thousands/µL Final    Monocytes Absolute 01/03/2019 0 60  0 17 - 1 22 Thousand/µL Final    Eosinophils Absolute 01/03/2019 0 20  0 00 - 0 61 Thousand/µL Final    Basophils Absolute 01/03/2019 0 00  0 00 - 0 10 Thousands/µL Final       No results found

## 2019-01-16 ENCOUNTER — HOSPITAL ENCOUNTER (OUTPATIENT)
Dept: ULTRASOUND IMAGING | Facility: HOSPITAL | Age: 79
Discharge: HOME | End: 2019-01-16
Attending: INTERNAL MEDICINE
Payer: MEDICARE

## 2019-01-16 DIAGNOSIS — N50.819 TESTICLE PAIN: ICD-10-CM

## 2019-01-16 PROCEDURE — 76870 US EXAM SCROTUM: CPT

## 2019-03-20 ENCOUNTER — OFFICE VISIT (OUTPATIENT)
Dept: FAMILY MEDICINE CLINIC | Facility: CLINIC | Age: 79
End: 2019-03-20
Payer: MEDICARE

## 2019-03-20 VITALS
DIASTOLIC BLOOD PRESSURE: 84 MMHG | BODY MASS INDEX: 28.28 KG/M2 | SYSTOLIC BLOOD PRESSURE: 128 MMHG | RESPIRATION RATE: 16 BRPM | HEART RATE: 83 BPM | WEIGHT: 202 LBS | TEMPERATURE: 98.5 F | OXYGEN SATURATION: 98 % | HEIGHT: 71 IN

## 2019-03-20 DIAGNOSIS — E78.5 HYPERLIPIDEMIA, UNSPECIFIED HYPERLIPIDEMIA TYPE: ICD-10-CM

## 2019-03-20 DIAGNOSIS — R03.0 BORDERLINE HYPERTENSION: Primary | ICD-10-CM

## 2019-03-20 PROBLEM — Z00.00 ENCOUNTER FOR GENERAL ADULT MEDICAL EXAMINATION WITHOUT ABNORMAL FINDINGS: Status: RESOLVED | Noted: 2019-03-20 | Resolved: 2019-03-20

## 2019-03-20 PROBLEM — Z00.00 ENCOUNTER FOR GENERAL ADULT MEDICAL EXAMINATION WITHOUT ABNORMAL FINDINGS: Status: ACTIVE | Noted: 2019-03-20

## 2019-03-20 PROBLEM — Z00.01 ENCOUNTER FOR GENERAL ADULT MEDICAL EXAMINATION WITH ABNORMAL FINDINGS: Status: ACTIVE | Noted: 2019-03-20

## 2019-03-20 PROBLEM — Z00.01 ENCOUNTER FOR GENERAL ADULT MEDICAL EXAMINATION WITH ABNORMAL FINDINGS: Status: RESOLVED | Noted: 2019-03-20 | Resolved: 2019-03-20

## 2019-03-20 PROCEDURE — 99213 OFFICE O/P EST LOW 20 MIN: CPT | Performed by: INTERNAL MEDICINE

## 2019-03-20 PROCEDURE — G0439 PPPS, SUBSEQ VISIT: HCPCS | Performed by: INTERNAL MEDICINE

## 2019-03-20 NOTE — PROGRESS NOTES
Assessment/Plan:  1  Hyperlipidemia patient on statin therapy last lipid profile was satisfactory  2  Borderline blood pressure which is stable without medicines 3  Left testicular pain resolved Urology consult reviewed  Follow-up and recheck in 3 months        Diagnoses and all orders for this visit:    Borderline hypertension    Hyperlipidemia, unspecified hyperlipidemia type          Subjective:      Patient ID: David Solorzano is a 77 y o  male  65-year-old male comes for a follow-up visit patient was last seen with symptoms of left testicular pain  Patient was evaluated by urology consult is reviewed apparently he has a small benign and a 8 mm cyst there is no evidence of any tumor and his pain is much improved now  PSA was 0 5  Other medical issues include hyperlipidemia on set statin therapy borderline blood pressure but his blood pressure is stable without any medications  The following portions of the patient's history were reviewed and updated as appropriate: He  has a past medical history of Hyperlipidemia  Patient Active Problem List    Diagnosis Date Noted    Hyperlipidemia 07/12/2018    Borderline hypertension 07/12/2018     He  has a past surgical history that includes Hernia repair  His family history is not on file  He  reports that he has quit smoking  He has never used smokeless tobacco  He reports that he drinks alcohol  He reports that he does not use drugs  Current Outpatient Medications   Medication Sig Dispense Refill    Multiple Vitamin (MULTIVITAMIN) tablet Take by mouth daily      simvastatin (ZOCOR) 20 mg tablet Take 20 mg by mouth daily       No current facility-administered medications for this visit        Current Outpatient Medications on File Prior to Visit   Medication Sig    Multiple Vitamin (MULTIVITAMIN) tablet Take by mouth daily    simvastatin (ZOCOR) 20 mg tablet Take 20 mg by mouth daily     No current facility-administered medications on file prior to visit  He has No Known Allergies       Review of Systems   Constitutional: Negative  HENT: Negative  Eyes: Negative  Respiratory: Negative  Cardiovascular: Negative  Gastrointestinal: Negative  Endocrine: Negative  Genitourinary: Negative  Musculoskeletal: Negative  Skin: Negative  Allergic/Immunologic: Negative  Neurological: Negative  Hematological: Negative  Psychiatric/Behavioral: Negative  Objective:      /84   Pulse 83   Temp 98 5 °F (36 9 °C) (Tympanic)   Resp 16   Ht 5' 11" (1 803 m)   Wt 91 6 kg (202 lb)   SpO2 98%   BMI 28 17 kg/m²          Physical Exam   Constitutional: He is oriented to person, place, and time  He appears well-developed and well-nourished  HENT:   Head: Normocephalic  Eyes: Pupils are equal, round, and reactive to light  Neck: Normal range of motion  Neck supple  No JVD present  No tracheal deviation present  No thyromegaly present  Cardiovascular: Normal rate, regular rhythm, normal heart sounds and intact distal pulses  Exam reveals no gallop and no friction rub  No murmur heard  Pulmonary/Chest: Effort normal and breath sounds normal    Abdominal: Soft  Bowel sounds are normal    Musculoskeletal: Normal range of motion  Neurological: He is alert and oriented to person, place, and time  Skin: Skin is warm           Lab Results   Component Value Date    WBC 5 60 01/03/2019    HGB 15 4 01/03/2019    HCT 45 8 01/03/2019    MCV 97 01/03/2019     01/03/2019     Lab Results   Component Value Date     05/11/2018    K 4 5 01/03/2019     01/03/2019    CO2 26 01/03/2019    ANIONGAP 13 7 05/11/2018    BUN 18 01/03/2019    CREATININE 0 87 01/03/2019    GLUF 105 (H) 01/03/2019    CALCIUM 9 1 01/03/2019    AST 18 01/03/2019    ALT 20 01/03/2019    ALKPHOS 72 01/03/2019    EGFR 82 01/03/2019     Lab Results   Component Value Date    CHOL 169 05/11/2018     Lab Results   Component Value Date    HDL 59 01/03/2019     Lab Results   Component Value Date    LDLCALC 120 (H) 01/03/2019     Lab Results   Component Value Date    TRIG 57 01/03/2019     No results found for: CHOLHDL  No results found for: HGBA1C  No results found for: IRM2QTSBHISL, TSH  Lab Results   Component Value Date    PSA 0 5 01/15/2019       No results found

## 2019-03-20 NOTE — PROGRESS NOTES
Assessment and Plan:    Problem List Items Addressed This Visit     None        Health Maintenance Due   Topic Date Due    Medicare Annual Wellness Visit (AWV)  1940    BMI: Followup Plan  08/21/1957    DTaP,Tdap,and Td Vaccines (1 - Tdap) 08/21/1960    Pneumococcal PPSV23/PCV13 65+ Years / Low and Medium Risk (1 of 2 - PCV13) 08/21/2004         HPI:  Stella Brizuela is a 77 y o  male here for his Initial Wellness Visit  Patient Active Problem List   Diagnosis    Hyperlipidemia    Borderline hypertension     Past Medical History:   Diagnosis Date    Hyperlipidemia      Past Surgical History:   Procedure Laterality Date    HERNIA REPAIR       History reviewed  No pertinent family history  Social History     Tobacco Use   Smoking Status Former Smoker   Smokeless Tobacco Never Used   Tobacco Comment    Smoked as a teenager     Social History     Substance and Sexual Activity   Alcohol Use Yes    Comment: Rare      Social History     Substance and Sexual Activity   Drug Use No       Current Outpatient Medications   Medication Sig Dispense Refill    Multiple Vitamin (MULTIVITAMIN) tablet Take by mouth daily      simvastatin (ZOCOR) 20 mg tablet Take 20 mg by mouth daily       No current facility-administered medications for this visit  No Known Allergies  Immunization History   Administered Date(s) Administered    INFLUENZA 10/11/2017    Influenza, high dose seasonal 0 5 mL 09/20/2018    Pneumococcal 11/12/2012, 01/06/2017       Patient Care Team:  Doyle Nelson MD as PCP - General (Internal Medicine)    Medicare Screening Tests and Risk Assessments:  Tito So is here for his Subsequent Wellness visit  Last Medicare Wellness visit information reviewed, patient interviewed, no change since last AWV  Health Risk Assessment:  Patient rates overall health as good  Patient feels that their physical health rating is Same  Eyesight was rated as Same  Hearing was rated as Same   Patient feels that their emotional and mental health rating is Same  Pain experienced by patient in the last 7 days has been Some  Patient's pain rating has been 3/10  Patient states that he has experienced no weight loss or gain in last 6 months  Emotional/Mental Health:  Patient has been feeling nervous/anxious  PHQ-9 Depression Screening:    Frequency of the following problems over the past two weeks:      1  Little interest or pleasure in doing things: 0 - not at all      2  Feeling down, depressed, or hopeless: 0 - not at all  PHQ-2 Score: 0          Broken Bones/Falls: Fall Risk Assessment:    In the past year, patient has experienced: No history of falling in past year          Bladder/Bowel:  Patient has not leaked urine accidently in the last six months  Patient reports no loss of bowel control  Immunizations:  Patient has had a flu vaccination within the last year  Patient has received a pneumonia shot  Patient has not received a shingles shot  Patient has not received tetanus/diphtheria shot  Home Safety:  Patient does not have trouble with stairs inside or outside of their home  Patient currently reports that there are no safety hazards present in home, working smoke alarms, working carbon monoxide detectors  Preventative Screenings:   no prostate cancer screen performed, no colon cancer screen completed, cholesterol screen completed, no glaucoma eye exam completed    Nutrition:  Current diet: Regular with servings of the following:    Medications:  Patient is currently taking over-the-counter supplements  Patient is able to manage medications  Lifestyle Choices:  Patient reports no tobacco use  Patient has smoked or used tobacco in the past   Patient has stopped his tobacco use  Patient reports alcohol use  Patient drives a vehicle  Patient wears seat belt          Activities of Daily Living:  Can get out of bed by his or her self, able to dress self, able to make own meals, able to do own shopping, able to bathe self, can do own laundry/housekeeping, can manage own money, pay bills and track expenses    Previous Hospitalizations:  No hospitalization or ED visit in past 12 months        Advanced Directives:  Patient has decided on a power of   Patient has spoken to designated power of   Patient has completed advanced directive  Preventative Screening/Counseling:      Cardiovascular:      General: Screening Current          Diabetes:      General: Screening Current          Colorectal Cancer:      General: Screening Current          Prostate Cancer:      General: Screening Current          Osteoporosis:      General: Screening Not Indicated          AAA:      General: Patient Declines          Glaucoma:      General: Screening Current          HIV:      General: Screening Not Indicated          Hepatitis C:      General: Screening Not Indicated        Advanced Directives:   Patient has living will for healthcare, has durable POA for healthcare, patient has an advanced directive       Immunizations:  Patient reviewed and up to date

## 2019-06-21 ENCOUNTER — TRANSCRIBE ORDERS (OUTPATIENT)
Dept: ADMINISTRATIVE | Facility: HOSPITAL | Age: 79
End: 2019-06-21

## 2019-06-21 ENCOUNTER — APPOINTMENT (OUTPATIENT)
Dept: LAB | Facility: HOSPITAL | Age: 79
End: 2019-06-21
Attending: INTERNAL MEDICINE
Payer: MEDICARE

## 2019-06-21 DIAGNOSIS — E78.5 HYPERLIPIDEMIA, UNSPECIFIED HYPERLIPIDEMIA TYPE: ICD-10-CM

## 2019-06-21 DIAGNOSIS — R03.0 BORDERLINE HYPERTENSION: ICD-10-CM

## 2019-06-21 LAB
ALBUMIN SERPL BCP-MCNC: 4.3 G/DL (ref 3.5–5.7)
ALP SERPL-CCNC: 76 U/L (ref 55–165)
ALT SERPL W P-5'-P-CCNC: 21 U/L (ref 7–52)
ANION GAP SERPL CALCULATED.3IONS-SCNC: 8 MMOL/L (ref 4–13)
AST SERPL W P-5'-P-CCNC: 18 U/L (ref 13–39)
BASOPHILS # BLD AUTO: 0 THOUSANDS/ΜL (ref 0–0.1)
BASOPHILS NFR BLD AUTO: 1 % (ref 0–1)
BILIRUB SERPL-MCNC: 0.6 MG/DL (ref 0.2–1)
BUN SERPL-MCNC: 20 MG/DL (ref 7–25)
CALCIUM SERPL-MCNC: 9.5 MG/DL (ref 8.6–10.5)
CHLORIDE SERPL-SCNC: 103 MMOL/L (ref 98–107)
CHOLEST SERPL-MCNC: 199 MG/DL (ref 0–200)
CO2 SERPL-SCNC: 26 MMOL/L (ref 21–31)
CREAT SERPL-MCNC: 0.88 MG/DL (ref 0.7–1.3)
EOSINOPHIL # BLD AUTO: 0.1 THOUSAND/ΜL (ref 0–0.61)
EOSINOPHIL NFR BLD AUTO: 3 % (ref 0–6)
ERYTHROCYTE [DISTWIDTH] IN BLOOD BY AUTOMATED COUNT: 13.8 % (ref 11.6–15.1)
GFR SERPL CREATININE-BSD FRML MDRD: 82 ML/MIN/1.73SQ M
GLUCOSE P FAST SERPL-MCNC: 108 MG/DL (ref 65–99)
HCT VFR BLD AUTO: 46.2 % (ref 42–52)
HDLC SERPL-MCNC: 55 MG/DL (ref 40–60)
HGB BLD-MCNC: 15.7 G/DL (ref 12–17)
LDLC SERPL CALC-MCNC: 128 MG/DL (ref 0–100)
LYMPHOCYTES # BLD AUTO: 1.4 THOUSANDS/ΜL (ref 0.6–4.47)
LYMPHOCYTES NFR BLD AUTO: 24 % (ref 14–44)
MCH RBC QN AUTO: 32.9 PG (ref 26.8–34.3)
MCHC RBC AUTO-ENTMCNC: 33.9 G/DL (ref 31.4–37.4)
MCV RBC AUTO: 97 FL (ref 82–98)
MONOCYTES # BLD AUTO: 0.6 THOUSAND/ΜL (ref 0.17–1.22)
MONOCYTES NFR BLD AUTO: 11 % (ref 4–12)
NEUTROPHILS # BLD AUTO: 3.6 THOUSANDS/ΜL (ref 1.85–7.62)
NEUTS SEG NFR BLD AUTO: 63 % (ref 43–75)
NONHDLC SERPL-MCNC: 144 MG/DL
PLATELET # BLD AUTO: 328 THOUSANDS/UL (ref 149–390)
PMV BLD AUTO: 7.7 FL (ref 8.9–12.7)
POTASSIUM SERPL-SCNC: 4.4 MMOL/L (ref 3.5–5.5)
PROT SERPL-MCNC: 7.2 G/DL (ref 6.4–8.9)
RBC # BLD AUTO: 4.76 MILLION/UL (ref 3.88–5.62)
SODIUM SERPL-SCNC: 137 MMOL/L (ref 134–143)
TRIGL SERPL-MCNC: 81 MG/DL (ref 44–166)
WBC # BLD AUTO: 5.7 THOUSAND/UL (ref 4.31–10.16)

## 2019-06-21 PROCEDURE — 80053 COMPREHEN METABOLIC PANEL: CPT

## 2019-06-21 PROCEDURE — 80061 LIPID PANEL: CPT

## 2019-06-21 PROCEDURE — 85025 COMPLETE CBC W/AUTO DIFF WBC: CPT

## 2019-06-21 PROCEDURE — 36415 COLL VENOUS BLD VENIPUNCTURE: CPT

## 2019-06-25 ENCOUNTER — OFFICE VISIT (OUTPATIENT)
Dept: FAMILY MEDICINE CLINIC | Facility: CLINIC | Age: 79
End: 2019-06-25
Payer: MEDICARE

## 2019-06-25 VITALS
BODY MASS INDEX: 27.44 KG/M2 | HEIGHT: 71 IN | OXYGEN SATURATION: 97 % | DIASTOLIC BLOOD PRESSURE: 78 MMHG | SYSTOLIC BLOOD PRESSURE: 138 MMHG | HEART RATE: 80 BPM | RESPIRATION RATE: 17 BRPM | TEMPERATURE: 98 F | WEIGHT: 196 LBS

## 2019-06-25 DIAGNOSIS — R03.0 BORDERLINE HYPERTENSION: ICD-10-CM

## 2019-06-25 DIAGNOSIS — E78.5 HYPERLIPIDEMIA, UNSPECIFIED HYPERLIPIDEMIA TYPE: Primary | ICD-10-CM

## 2019-06-25 PROCEDURE — 99213 OFFICE O/P EST LOW 20 MIN: CPT | Performed by: INTERNAL MEDICINE

## 2019-06-25 RX ORDER — SIMVASTATIN 20 MG
20 TABLET ORAL DAILY
Qty: 90 TABLET | Refills: 1 | Status: CANCELLED | OUTPATIENT
Start: 2019-06-25

## 2019-08-06 PROBLEM — E66.3 OVERWEIGHT (BMI 25.0-29.9): Status: ACTIVE | Noted: 2019-08-06

## 2019-09-26 ENCOUNTER — OFFICE VISIT (OUTPATIENT)
Dept: FAMILY MEDICINE CLINIC | Facility: CLINIC | Age: 79
End: 2019-09-26
Payer: MEDICARE

## 2019-09-26 VITALS
HEIGHT: 71 IN | SYSTOLIC BLOOD PRESSURE: 128 MMHG | DIASTOLIC BLOOD PRESSURE: 76 MMHG | HEART RATE: 72 BPM | WEIGHT: 196 LBS | BODY MASS INDEX: 27.44 KG/M2 | RESPIRATION RATE: 18 BRPM | OXYGEN SATURATION: 98 % | TEMPERATURE: 99 F

## 2019-09-26 DIAGNOSIS — Z13.31 NEGATIVE DEPRESSION SCREENING: ICD-10-CM

## 2019-09-26 DIAGNOSIS — Z12.5 SCREENING FOR PROSTATE CANCER: ICD-10-CM

## 2019-09-26 DIAGNOSIS — E78.00 HYPERCHOLESTEROLEMIA: ICD-10-CM

## 2019-09-26 DIAGNOSIS — I10 ESSENTIAL HYPERTENSION: Primary | ICD-10-CM

## 2019-09-26 DIAGNOSIS — E66.3 OVERWEIGHT (BMI 25.0-29.9): ICD-10-CM

## 2019-09-26 PROCEDURE — 99214 OFFICE O/P EST MOD 30 MIN: CPT | Performed by: FAMILY MEDICINE

## 2019-09-26 NOTE — PROGRESS NOTES
Assessment/Plan:    No problem-specific Assessment & Plan notes found for this encounter  Diagnoses and all orders for this visit:    Essential hypertension  -     CBC and differential; Future  -     TSH, 3rd generation with Free T4 reflex; Future    Hypercholesterolemia  -     Comprehensive metabolic panel; Future  -     Lipid panel; Future  -     TSH, 3rd generation with Free T4 reflex; Future    Overweight (BMI 25 0-29  9)  Comments:  pt counseled on diet and exercise    Negative depression screening    Screening for prostate cancer  -     PSA, Total Screen; Future          PHQ-9 Depression Screening    PHQ-9:    Frequency of the following problems over the past two weeks:       Little interest or pleasure in doing things:  0 - not at all  Feeling down, depressed, or hopeless:  0 - not at all  PHQ-2 Score:  0            Subjective:      Patient ID: Steven Paulino is a [de-identified] y o  male  Hypertension   This is a chronic problem  The problem is unchanged  The problem is controlled  Pertinent negatives include no chest pain, headaches or palpitations  There are no associated agents to hypertension  Risk factors for coronary artery disease include obesity, male gender and sedentary lifestyle  Past treatments include nothing  The current treatment provides moderate improvement  Compliance problems include exercise and diet  The following portions of the patient's history were reviewed and updated as appropriate: allergies, current medications, past family history, past medical history, past social history, past surgical history and problem list     Review of Systems   Eyes: Negative for visual disturbance  Cardiovascular: Negative for chest pain and palpitations  Neurological: Negative for headaches           Objective:    /76   Pulse 72   Temp 99 °F (37 2 °C) (Tympanic)   Resp 18   Ht 5' 11" (1 803 m)   Wt 88 9 kg (196 lb)   SpO2 98%   BMI 27 34 kg/m²      Physical Exam   Constitutional: He is oriented to person, place, and time  He appears well-developed and well-nourished  No distress  HENT:   Head: Normocephalic and atraumatic  Eyes: Pupils are equal, round, and reactive to light  Conjunctivae and EOM are normal  No scleral icterus  Neck: Normal range of motion  Neck supple  Cardiovascular: Normal rate, regular rhythm and normal heart sounds  No murmur heard  Pulmonary/Chest: Effort normal and breath sounds normal  No respiratory distress  He has no wheezes  He has no rales  Abdominal: Soft  Bowel sounds are normal  He exhibits no distension and no mass  There is no tenderness  There is no rebound and no guarding  Musculoskeletal: He exhibits no edema  Lymphadenopathy:     He has no cervical adenopathy  Neurological: He is alert and oriented to person, place, and time  Skin: Skin is warm and dry  He is not diaphoretic  Psychiatric: He has a normal mood and affect  His behavior is normal  Judgment and thought content normal    Nursing note and vitals reviewed  BMI Counseling: Body mass index is 27 34 kg/m²  The BMI is above normal  Nutrition recommendations include reducing portion sizes and 3-5 servings of fruits/vegetables daily  Exercise recommendations include moderate aerobic physical activity for 150 minutes/week and exercising 3-5 times per week

## 2020-06-08 ENCOUNTER — APPOINTMENT (OUTPATIENT)
Dept: LAB | Facility: CLINIC | Age: 80
End: 2020-06-08
Payer: MEDICARE

## 2020-06-08 DIAGNOSIS — E78.00 HYPERCHOLESTEROLEMIA: ICD-10-CM

## 2020-06-08 DIAGNOSIS — I10 ESSENTIAL HYPERTENSION: ICD-10-CM

## 2020-06-08 DIAGNOSIS — Z12.5 SCREENING FOR PROSTATE CANCER: ICD-10-CM

## 2020-06-08 LAB
ALBUMIN SERPL BCP-MCNC: 3.7 G/DL (ref 3.5–5)
ALP SERPL-CCNC: 80 U/L (ref 46–116)
ALT SERPL W P-5'-P-CCNC: 31 U/L (ref 12–78)
ANION GAP SERPL CALCULATED.3IONS-SCNC: 7 MMOL/L (ref 4–13)
AST SERPL W P-5'-P-CCNC: 23 U/L (ref 5–45)
BASOPHILS # BLD AUTO: 0.02 THOUSANDS/ΜL (ref 0–0.1)
BASOPHILS NFR BLD AUTO: 0 % (ref 0–1)
BILIRUB SERPL-MCNC: 0.61 MG/DL (ref 0.2–1)
BUN SERPL-MCNC: 15 MG/DL (ref 5–25)
CALCIUM SERPL-MCNC: 8.5 MG/DL (ref 8.3–10.1)
CHLORIDE SERPL-SCNC: 109 MMOL/L (ref 100–108)
CHOLEST SERPL-MCNC: 136 MG/DL (ref 50–200)
CO2 SERPL-SCNC: 23 MMOL/L (ref 21–32)
CREAT SERPL-MCNC: 0.78 MG/DL (ref 0.6–1.3)
EOSINOPHIL # BLD AUTO: 0.11 THOUSAND/ΜL (ref 0–0.61)
EOSINOPHIL NFR BLD AUTO: 2 % (ref 0–6)
ERYTHROCYTE [DISTWIDTH] IN BLOOD BY AUTOMATED COUNT: 13.1 % (ref 11.6–15.1)
GFR SERPL CREATININE-BSD FRML MDRD: 85 ML/MIN/1.73SQ M
GLUCOSE P FAST SERPL-MCNC: 90 MG/DL (ref 65–99)
HCT VFR BLD AUTO: 44.5 % (ref 36.5–49.3)
HDLC SERPL-MCNC: 55 MG/DL
HGB BLD-MCNC: 14.8 G/DL (ref 12–17)
IMM GRANULOCYTES # BLD AUTO: 0.01 THOUSAND/UL (ref 0–0.2)
IMM GRANULOCYTES NFR BLD AUTO: 0 % (ref 0–2)
LDLC SERPL CALC-MCNC: 74 MG/DL (ref 0–100)
LYMPHOCYTES # BLD AUTO: 1.39 THOUSANDS/ΜL (ref 0.6–4.47)
LYMPHOCYTES NFR BLD AUTO: 25 % (ref 14–44)
MCH RBC QN AUTO: 32.3 PG (ref 26.8–34.3)
MCHC RBC AUTO-ENTMCNC: 33.3 G/DL (ref 31.4–37.4)
MCV RBC AUTO: 97 FL (ref 82–98)
MONOCYTES # BLD AUTO: 0.69 THOUSAND/ΜL (ref 0.17–1.22)
MONOCYTES NFR BLD AUTO: 12 % (ref 4–12)
NEUTROPHILS # BLD AUTO: 3.45 THOUSANDS/ΜL (ref 1.85–7.62)
NEUTS SEG NFR BLD AUTO: 61 % (ref 43–75)
NONHDLC SERPL-MCNC: 81 MG/DL
NRBC BLD AUTO-RTO: 0 /100 WBCS
PLATELET # BLD AUTO: 363 THOUSANDS/UL (ref 149–390)
PMV BLD AUTO: 10.1 FL (ref 8.9–12.7)
POTASSIUM SERPL-SCNC: 4 MMOL/L (ref 3.5–5.3)
PROT SERPL-MCNC: 7.5 G/DL (ref 6.4–8.2)
PSA SERPL-MCNC: 0.5 NG/ML (ref 0–4)
RBC # BLD AUTO: 4.58 MILLION/UL (ref 3.88–5.62)
SODIUM SERPL-SCNC: 139 MMOL/L (ref 136–145)
T4 FREE SERPL-MCNC: 0.82 NG/DL (ref 0.76–1.46)
TRIGL SERPL-MCNC: 35 MG/DL
TSH SERPL DL<=0.05 MIU/L-ACNC: 4.32 UIU/ML (ref 0.36–3.74)
WBC # BLD AUTO: 5.67 THOUSAND/UL (ref 4.31–10.16)

## 2020-06-08 PROCEDURE — 80053 COMPREHEN METABOLIC PANEL: CPT

## 2020-06-08 PROCEDURE — G0103 PSA SCREENING: HCPCS

## 2020-06-08 PROCEDURE — 84443 ASSAY THYROID STIM HORMONE: CPT

## 2020-06-08 PROCEDURE — 36415 COLL VENOUS BLD VENIPUNCTURE: CPT

## 2020-06-08 PROCEDURE — 80061 LIPID PANEL: CPT

## 2020-06-08 PROCEDURE — 85025 COMPLETE CBC W/AUTO DIFF WBC: CPT

## 2020-06-08 PROCEDURE — 84439 ASSAY OF FREE THYROXINE: CPT

## 2020-06-12 ENCOUNTER — OFFICE VISIT (OUTPATIENT)
Dept: FAMILY MEDICINE CLINIC | Facility: CLINIC | Age: 80
End: 2020-06-12
Payer: MEDICARE

## 2020-06-12 VITALS
HEART RATE: 86 BPM | DIASTOLIC BLOOD PRESSURE: 68 MMHG | WEIGHT: 188 LBS | HEIGHT: 71 IN | TEMPERATURE: 98.1 F | BODY MASS INDEX: 26.32 KG/M2 | OXYGEN SATURATION: 95 % | SYSTOLIC BLOOD PRESSURE: 128 MMHG

## 2020-06-12 DIAGNOSIS — Z13.31 NEGATIVE DEPRESSION SCREENING: ICD-10-CM

## 2020-06-12 DIAGNOSIS — E66.3 OVERWEIGHT: ICD-10-CM

## 2020-06-12 DIAGNOSIS — E78.00 HYPERCHOLESTEROLEMIA: ICD-10-CM

## 2020-06-12 DIAGNOSIS — Z00.00 MEDICARE ANNUAL WELLNESS VISIT, SUBSEQUENT: Primary | ICD-10-CM

## 2020-06-12 DIAGNOSIS — Z23 ENCOUNTER FOR IMMUNIZATION: ICD-10-CM

## 2020-06-12 PROCEDURE — 4040F PNEUMOC VAC/ADMIN/RCVD: CPT | Performed by: FAMILY MEDICINE

## 2020-06-12 PROCEDURE — 1160F RVW MEDS BY RX/DR IN RCRD: CPT | Performed by: FAMILY MEDICINE

## 2020-06-12 PROCEDURE — 3078F DIAST BP <80 MM HG: CPT | Performed by: FAMILY MEDICINE

## 2020-06-12 PROCEDURE — 3008F BODY MASS INDEX DOCD: CPT | Performed by: FAMILY MEDICINE

## 2020-06-12 PROCEDURE — G0439 PPPS, SUBSEQ VISIT: HCPCS | Performed by: FAMILY MEDICINE

## 2020-06-12 PROCEDURE — 1036F TOBACCO NON-USER: CPT | Performed by: FAMILY MEDICINE

## 2020-06-12 PROCEDURE — 90670 PCV13 VACCINE IM: CPT

## 2020-06-12 PROCEDURE — 3074F SYST BP LT 130 MM HG: CPT | Performed by: FAMILY MEDICINE

## 2020-06-12 PROCEDURE — G0009 ADMIN PNEUMOCOCCAL VACCINE: HCPCS

## 2020-06-12 PROCEDURE — 1170F FXNL STATUS ASSESSED: CPT | Performed by: FAMILY MEDICINE

## 2020-06-12 PROCEDURE — 1125F AMNT PAIN NOTED PAIN PRSNT: CPT | Performed by: FAMILY MEDICINE

## 2020-06-12 PROCEDURE — 99214 OFFICE O/P EST MOD 30 MIN: CPT | Performed by: FAMILY MEDICINE

## 2020-06-12 RX ORDER — ASCORBIC ACID 500 MG
500 TABLET ORAL DAILY
COMMUNITY
End: 2022-04-13 | Stop reason: ALTCHOICE

## 2020-07-15 DIAGNOSIS — R41.82 ALTERED MENTAL STATUS, UNSPECIFIED ALTERED MENTAL STATUS TYPE: ICD-10-CM

## 2020-07-15 DIAGNOSIS — R47.81 SLURRED SPEECH: Primary | ICD-10-CM

## 2020-07-16 ENCOUNTER — HOSPITAL ENCOUNTER (OUTPATIENT)
Dept: MRI IMAGING | Facility: HOSPITAL | Age: 80
Discharge: HOME | End: 2020-07-16
Attending: FAMILY MEDICINE
Payer: MEDICARE

## 2020-07-16 ENCOUNTER — HOSPITAL ENCOUNTER (OUTPATIENT)
Dept: RADIOLOGY | Facility: HOSPITAL | Age: 80
Discharge: HOME | End: 2020-07-16

## 2020-07-16 DIAGNOSIS — R47.81 SLURRED SPEECH: ICD-10-CM

## 2020-07-16 DIAGNOSIS — R41.82 ALTERED MENTAL STATUS, UNSPECIFIED ALTERED MENTAL STATUS TYPE: ICD-10-CM

## 2020-07-16 PROCEDURE — 70553 MRI BRAIN STEM W/O & W/DYE: CPT

## 2020-07-16 PROCEDURE — A9585 GADOBUTROL INJECTION: HCPCS | Performed by: RADIOLOGY

## 2020-07-16 RX ADMIN — GADOBUTROL 8 ML: 604.72 INJECTION INTRAVENOUS at 20:06

## 2020-07-20 ENCOUNTER — OFFICE VISIT (OUTPATIENT)
Dept: FAMILY MEDICINE CLINIC | Facility: CLINIC | Age: 80
End: 2020-07-20
Payer: MEDICARE

## 2020-07-20 VITALS
BODY MASS INDEX: 26.12 KG/M2 | DIASTOLIC BLOOD PRESSURE: 74 MMHG | TEMPERATURE: 97.1 F | WEIGHT: 186.6 LBS | HEART RATE: 80 BPM | SYSTOLIC BLOOD PRESSURE: 142 MMHG | HEIGHT: 71 IN | OXYGEN SATURATION: 96 %

## 2020-07-20 DIAGNOSIS — F01.50 ISCHEMIC VASCULAR DEMENTIA (HCC): Primary | ICD-10-CM

## 2020-07-20 DIAGNOSIS — E66.3 OVERWEIGHT WITH BODY MASS INDEX (BMI) OF 26 TO 26.9 IN ADULT: ICD-10-CM

## 2020-07-20 PROCEDURE — 4040F PNEUMOC VAC/ADMIN/RCVD: CPT | Performed by: FAMILY MEDICINE

## 2020-07-20 PROCEDURE — 3008F BODY MASS INDEX DOCD: CPT | Performed by: FAMILY MEDICINE

## 2020-07-20 PROCEDURE — 1036F TOBACCO NON-USER: CPT | Performed by: FAMILY MEDICINE

## 2020-07-20 PROCEDURE — 3078F DIAST BP <80 MM HG: CPT | Performed by: FAMILY MEDICINE

## 2020-07-20 PROCEDURE — 3077F SYST BP >= 140 MM HG: CPT | Performed by: FAMILY MEDICINE

## 2020-07-20 PROCEDURE — 1160F RVW MEDS BY RX/DR IN RCRD: CPT | Performed by: FAMILY MEDICINE

## 2020-07-20 PROCEDURE — 99213 OFFICE O/P EST LOW 20 MIN: CPT | Performed by: FAMILY MEDICINE

## 2020-07-20 RX ORDER — ATORVASTATIN CALCIUM 10 MG/1
TABLET, FILM COATED ORAL
COMMUNITY
End: 2021-09-17

## 2020-07-20 NOTE — PROGRESS NOTES
Assessment/Plan:    No problem-specific Assessment & Plan notes found for this encounter  Diagnoses and all orders for this visit:    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[facility] [address] )  Comments:  mild    Overweight with body mass index (BMI) of 26 to 26 9 in adult    Other orders  -     atorvastatin (Lipitor) 10 mg tablet; Take by mouth          PHQ-9 Depression Screening    PHQ-9:    Frequency of the following problems over the past two weeks:       Little interest or pleasure in doing things:  0 - not at all  Feeling down, depressed, or hopeless:  0 - not at all  PHQ-2 Score:  0            Subjective:      Patient ID: Adonis Bueno is a [de-identified] y o  male  Pt here at the request of his ENT for abnormal speech patterns, pt had an MRI of the brain which showed micro ischemic changes likely associated with age      The following portions of the patient's history were reviewed and updated as appropriate: allergies, current medications, past family history, past medical history, past social history, past surgical history and problem list     Review of Systems   Constitutional: Negative for fatigue  Neurological: Negative for dizziness, tremors, speech difficulty, weakness, numbness and headaches  BMI Counseling: Body mass index is 26 03 kg/m²  The BMI is above normal  Nutrition recommendations include reducing portion sizes and 3-5 servings of fruits/vegetables daily  Exercise recommendations include moderate aerobic physical activity for 150 minutes/week and exercising 3-5 times per week  Objective:    /74   Pulse 80   Temp (!) 97 1 °F (36 2 °C)   Ht 5' 11" (1 803 m)   Wt 84 6 kg (186 lb 9 6 oz)   SpO2 96%   BMI 26 03 kg/m²      Physical Exam   Constitutional: He is oriented to person, place, and time  He appears well-developed and well-nourished  No distress  HENT:   Head: Normocephalic and atraumatic  Eyes: Pupils are equal, round, and reactive to light   Conjunctivae and EOM are normal  No scleral icterus  Neck: Normal range of motion  Neck supple  Cardiovascular: Normal rate, regular rhythm and normal heart sounds  No murmur heard  Pulmonary/Chest: Effort normal and breath sounds normal  No respiratory distress  He has no wheezes  He has no rales  Musculoskeletal: He exhibits no edema  Lymphadenopathy:     He has no cervical adenopathy  Neurological: He is alert and oriented to person, place, and time  Skin: Skin is warm and dry  He is not diaphoretic  Psychiatric: He has a normal mood and affect  His behavior is normal  Judgment and thought content normal    Nursing note and vitals reviewed

## 2020-10-12 ENCOUNTER — IMMUNIZATIONS (OUTPATIENT)
Dept: FAMILY MEDICINE CLINIC | Facility: CLINIC | Age: 80
End: 2020-10-12
Payer: MEDICARE

## 2020-10-12 DIAGNOSIS — Z23 ENCOUNTER FOR IMMUNIZATION: ICD-10-CM

## 2020-10-12 PROCEDURE — 90662 IIV NO PRSV INCREASED AG IM: CPT | Performed by: FAMILY MEDICINE

## 2020-10-12 PROCEDURE — G0008 ADMIN INFLUENZA VIRUS VAC: HCPCS | Performed by: FAMILY MEDICINE

## 2021-01-19 ENCOUNTER — IMMUNIZATIONS (OUTPATIENT)
Dept: FAMILY MEDICINE CLINIC | Facility: HOSPITAL | Age: 81
End: 2021-01-19

## 2021-01-19 DIAGNOSIS — Z23 ENCOUNTER FOR IMMUNIZATION: Primary | ICD-10-CM

## 2021-01-19 PROCEDURE — 0011A SARS-COV-2 / COVID-19 MRNA VACCINE (MODERNA) 100 MCG: CPT

## 2021-01-19 PROCEDURE — 91301 SARS-COV-2 / COVID-19 MRNA VACCINE (MODERNA) 100 MCG: CPT

## 2021-01-27 ENCOUNTER — OFFICE VISIT (OUTPATIENT)
Dept: FAMILY MEDICINE CLINIC | Facility: CLINIC | Age: 81
End: 2021-01-27
Payer: MEDICARE

## 2021-01-27 VITALS
OXYGEN SATURATION: 98 % | HEART RATE: 73 BPM | HEIGHT: 71 IN | SYSTOLIC BLOOD PRESSURE: 130 MMHG | WEIGHT: 186.4 LBS | DIASTOLIC BLOOD PRESSURE: 80 MMHG | TEMPERATURE: 98.6 F | BODY MASS INDEX: 26.1 KG/M2

## 2021-01-27 DIAGNOSIS — E66.3 OVERWEIGHT (BMI 25.0-29.9): ICD-10-CM

## 2021-01-27 DIAGNOSIS — E78.5 HYPERLIPIDEMIA, UNSPECIFIED HYPERLIPIDEMIA TYPE: ICD-10-CM

## 2021-01-27 DIAGNOSIS — E78.00 HYPERCHOLESTEROLEMIA: Primary | ICD-10-CM

## 2021-01-27 DIAGNOSIS — Z13.31 NEGATIVE DEPRESSION SCREENING: ICD-10-CM

## 2021-01-27 DIAGNOSIS — Z12.5 SCREENING FOR PROSTATE CANCER: ICD-10-CM

## 2021-01-27 PROCEDURE — 99213 OFFICE O/P EST LOW 20 MIN: CPT | Performed by: FAMILY MEDICINE

## 2021-01-27 RX ORDER — OMEGA-3S/DHA/EPA/FISH OIL/D3 300MG-1000
400 CAPSULE ORAL DAILY
COMMUNITY
End: 2022-04-13 | Stop reason: ALTCHOICE

## 2021-01-27 NOTE — PROGRESS NOTES
Assessment/Plan:    No problem-specific Assessment & Plan notes found for this encounter  Diagnoses and all orders for this visit:    Hypercholesterolemia  Comments:  pt counseled on diet and exercise    Overweight (BMI 25 0-29  9)    Negative depression screening    Hyperlipidemia, unspecified hyperlipidemia type  -     CBC and differential; Future  -     Comprehensive metabolic panel; Future  -     Lipid panel; Future  -     TSH, 3rd generation with Free T4 reflex; Future    Screening for prostate cancer  -     PSA, Total Screen; Future    Other orders  -     cholecalciferol (VITAMIN D3) 400 units tablet; Take 400 Units by mouth daily  -     co-enzyme Q-10 30 MG capsule; Take 30 mg by mouth 3 (three) times a day          PHQ-9 Depression Screening    PHQ-9:   Frequency of the following problems over the past two weeks:      Little interest or pleasure in doing things: 0 - not at all  Feeling down, depressed, or hopeless: 0 - not at all  PHQ-2 Score: 0        BMI Counseling: Body mass index is 26 kg/m²  The BMI is above normal  Nutrition recommendations include reducing portion sizes and 3-5 servings of fruits/vegetables daily  Exercise recommendations include moderate aerobic physical activity for 150 minutes/week and exercising 3-5 times per week  Subjective:      Patient ID: Epifanio Schirmer is a 79 y o  male  Hyperlipidemia  This is a chronic problem  Factors aggravating his hyperlipidemia include fatty foods  Pertinent negatives include no chest pain  Current antihyperlipidemic treatment includes statins  The current treatment provides significant improvement of lipids  Compliance problems include adherence to exercise and adherence to diet          The following portions of the patient's history were reviewed and updated as appropriate: allergies, current medications, past family history, past medical history, past social history, past surgical history and problem list     Review of Systems   Eyes: Negative for visual disturbance  Cardiovascular: Negative for chest pain and palpitations  Neurological: Negative for headaches  Objective:    /80   Pulse 73   Temp 98 6 °F (37 °C)   Ht 5' 11" (1 803 m)   Wt 84 6 kg (186 lb 6 4 oz)   SpO2 98%   BMI 26 00 kg/m²      Physical Exam  Vitals signs and nursing note reviewed  Constitutional:       General: He is not in acute distress  Appearance: Normal appearance  He is well-developed  He is not ill-appearing, toxic-appearing or diaphoretic  HENT:      Head: Normocephalic and atraumatic  Nose: Nose normal       Mouth/Throat:      Mouth: Mucous membranes are moist    Eyes:      General: No scleral icterus  Conjunctiva/sclera: Conjunctivae normal       Pupils: Pupils are equal, round, and reactive to light  Neck:      Musculoskeletal: Normal range of motion and neck supple  No neck rigidity  Cardiovascular:      Rate and Rhythm: Normal rate and regular rhythm  Heart sounds: Normal heart sounds  No murmur  Pulmonary:      Effort: Pulmonary effort is normal  No respiratory distress  Breath sounds: Normal breath sounds  No wheezing, rhonchi or rales  Abdominal:      General: Bowel sounds are normal  There is no distension  Palpations: Abdomen is soft  There is no mass  Tenderness: There is no abdominal tenderness  There is no guarding or rebound  Musculoskeletal:      Right lower leg: No edema  Left lower leg: No edema  Lymphadenopathy:      Cervical: No cervical adenopathy  Skin:     General: Skin is warm and dry  Findings: No rash  Neurological:      Mental Status: He is alert and oriented to person, place, and time  Sensory: No sensory deficit  Motor: No weakness  Coordination: Coordination normal       Gait: Gait normal    Psychiatric:         Mood and Affect: Mood normal          Behavior: Behavior normal          Thought Content:  Thought content normal          Judgment: Judgment normal

## 2021-02-17 ENCOUNTER — IMMUNIZATIONS (OUTPATIENT)
Dept: FAMILY MEDICINE CLINIC | Facility: HOSPITAL | Age: 81
End: 2021-02-17

## 2021-02-17 DIAGNOSIS — Z23 ENCOUNTER FOR IMMUNIZATION: Primary | ICD-10-CM

## 2021-02-17 PROCEDURE — 0012A SARS-COV-2 / COVID-19 MRNA VACCINE (MODERNA) 100 MCG: CPT

## 2021-02-17 PROCEDURE — 91301 SARS-COV-2 / COVID-19 MRNA VACCINE (MODERNA) 100 MCG: CPT

## 2021-04-08 ENCOUNTER — HOSPITAL ENCOUNTER (EMERGENCY)
Facility: HOSPITAL | Age: 81
Discharge: HOME | End: 2021-04-08
Attending: EMERGENCY MEDICINE | Admitting: EMERGENCY MEDICINE
Payer: MEDICARE

## 2021-04-08 ENCOUNTER — APPOINTMENT (EMERGENCY)
Dept: RADIOLOGY | Facility: HOSPITAL | Age: 81
End: 2021-04-08
Payer: MEDICARE

## 2021-04-08 DIAGNOSIS — S61.419A HAND LACERATION: Primary | ICD-10-CM

## 2021-04-08 PROCEDURE — 99284 EMERGENCY DEPT VISIT MOD MDM: CPT | Performed by: EMERGENCY MEDICINE

## 2021-04-08 PROCEDURE — 99283 EMERGENCY DEPT VISIT LOW MDM: CPT

## 2021-04-08 PROCEDURE — 73130 X-RAY EXAM OF HAND: CPT

## 2021-04-08 PROCEDURE — 90471 IMMUNIZATION ADMIN: CPT

## 2021-04-08 PROCEDURE — 90715 TDAP VACCINE 7 YRS/> IM: CPT | Performed by: EMERGENCY MEDICINE

## 2021-04-08 RX ORDER — IBUPROFEN 600 MG/1
600 TABLET ORAL ONCE
Status: COMPLETED | OUTPATIENT
Start: 2021-04-08 | End: 2021-04-08

## 2021-04-08 RX ORDER — CEPHALEXIN 500 MG/1
500 CAPSULE ORAL EVERY 6 HOURS SCHEDULED
Qty: 28 CAPSULE | Refills: 0 | Status: SHIPPED | OUTPATIENT
Start: 2021-04-08 | End: 2021-04-15

## 2021-04-08 RX ORDER — ACETAMINOPHEN 325 MG/1
650 TABLET ORAL ONCE
Status: COMPLETED | OUTPATIENT
Start: 2021-04-08 | End: 2021-04-08

## 2021-04-08 RX ADMIN — IBUPROFEN 600 MG: 600 TABLET, FILM COATED ORAL at 08:55

## 2021-04-08 RX ADMIN — ACETAMINOPHEN 650 MG: 325 TABLET ORAL at 08:55

## 2021-04-08 RX ADMIN — TETANUS TOXOID, REDUCED DIPHTHERIA TOXOID AND ACELLULAR PERTUSSIS VACCINE, ADSORBED 0.5 ML: 5; 2.5; 8; 8; 2.5 SUSPENSION INTRAMUSCULAR at 08:43

## 2021-04-08 NOTE — ED PROVIDER NOTES
History  Chief Complaint   Patient presents with    Hand Injury     Patient is an 79-year-old male presenting with a hand injury to his left dorsal hand  Patient has difficulty hearing and is accompanied with wife to help  Patient reached backwards yesterday with his left hand instructed against a tree and had pain and bleeding since  He did not shows wife until later that evening and she urged him to come to the ER today to get evaluated  No pain, no blood thinners, no pus, fever or chills  Hand Injury  Pain details:     Quality:  Dull    Radiates to:  Does not radiate  Foreign body present:  No foreign bodies  Tetanus status:  Unknown  Relieved by:  Nothing  Worsened by:  Nothing  Ineffective treatments:  None tried  Associated symptoms: no back pain and no fever    Risk factors: no concern for non-accidental trauma        Prior to Admission Medications   Prescriptions Last Dose Informant Patient Reported? Taking? Multiple Vitamin (MULTIVITAMIN) tablet  Self Yes No   Sig: Take by mouth daily   Multiple Vitamins-Minerals (PRESERVISION AREDS 2 PO)  Self Yes No   Sig: Take by mouth   ascorbic acid (VITAMIN C) 500 mg tablet  Self Yes No   Sig: Take 500 mg by mouth daily   atorvastatin (Lipitor) 10 mg tablet  Self Yes No   Sig: Take by mouth   cholecalciferol (VITAMIN D3) 400 units tablet  Self Yes No   Sig: Take 400 Units by mouth daily   ciprofloxacin-dexamethasone (CIPRODEX) otic suspension   No No   Sig: Administer 4 drops to the right ear 2 (two) times a day for 7 days   Patient not taking: Reported on 7/15/2020   co-enzyme Q-10 30 MG capsule  Self Yes No   Sig: Take 30 mg by mouth 3 (three) times a day      Facility-Administered Medications: None       Past Medical History:   Diagnosis Date    Encounter for general adult medical examination without abnormal findings 3/20/2019    Hyperlipidemia        Past Surgical History:   Procedure Laterality Date    HERNIA REPAIR         History reviewed   No pertinent family history  I have reviewed and agree with the history as documented  E-Cigarette/Vaping    E-Cigarette Use Never User      E-Cigarette/Vaping Substances    Nicotine No     THC No     CBD No     Flavoring No     Other No     Unknown No      Social History     Tobacco Use    Smoking status: Former Smoker    Smokeless tobacco: Never Used    Tobacco comment: Smoked as a teenager   Substance Use Topics    Alcohol use: Yes     Comment: Rare    Drug use: No       Review of Systems   Constitutional: Negative for chills and fever  HENT: Negative for congestion, nosebleeds, rhinorrhea and sore throat  Eyes: Negative for pain and visual disturbance  Respiratory: Negative for cough and wheezing  Cardiovascular: Negative for chest pain and leg swelling  Gastrointestinal: Negative for abdominal distention, abdominal pain, diarrhea, nausea and vomiting  Genitourinary: Negative for dysuria and frequency  Musculoskeletal: Negative for back pain and joint swelling  Skin: Negative for rash and wound  Neurological: Negative for weakness and numbness  Psychiatric/Behavioral: Negative for decreased concentration and suicidal ideas  Physical Exam  Physical Exam  Vitals signs and nursing note reviewed  Constitutional:       General: He is not in acute distress  Appearance: Normal appearance  HENT:      Head: Normocephalic and atraumatic  Nose: Nose normal    Eyes:      General:         Right eye: No discharge  Left eye: No discharge  Cardiovascular:      Rate and Rhythm: Normal rate and regular rhythm  Pulmonary:      Effort: Pulmonary effort is normal  No respiratory distress  Abdominal:      General: Abdomen is flat  There is no distension  Musculoskeletal: Normal range of motion  General: No deformity  Comments: NVI, tendon intact  Two lacerations over dorsum of left hand     Skin:     General: Skin is warm  Findings: No rash  Neurological:      Mental Status: He is alert and oriented to person, place, and time  Motor: No weakness  Psychiatric:         Mood and Affect: Mood normal          Behavior: Behavior normal          Vital Signs  ED Triage Vitals [04/08/21 0807]   Temperature Pulse Respirations Blood Pressure SpO2   97 9 °F (36 6 °C) 100 16 149/81 97 %      Temp Source Heart Rate Source Patient Position - Orthostatic VS BP Location FiO2 (%)   Temporal Monitor Sitting Right arm --      Pain Score       No Pain           Vitals:    04/08/21 0807 04/08/21 0857   BP: 149/81 127/79   Pulse: 100 86   Patient Position - Orthostatic VS: Sitting Sitting         Visual Acuity      ED Medications  Medications   tetanus-diphtheria-acellular pertussis (BOOSTRIX) IM injection 0 5 mL (0 5 mL Intramuscular Given 4/8/21 0843)   ibuprofen (MOTRIN) tablet 600 mg (600 mg Oral Given 4/8/21 0855)   acetaminophen (TYLENOL) tablet 650 mg (650 mg Oral Given 4/8/21 0855)       Diagnostic Studies  Results Reviewed     None                 XR hand 3+ views LEFT   Final Result by Karel Andujar MD (04/08 1012)      No acute osseous abnormality  Degenerative changes as described  Workstation performed: [id number]                    Procedures  Procedures         ED Course  ED Course as of Apr 08 1129   Thu Apr 08, 2021   0847 XR negative fb fracture                                SBIRT 20yo+      Most Recent Value   SBIRT (23 yo +)   In order to provide better care to our patients, we are screening all of our patients for alcohol and drug use  Would it be okay to ask you these screening questions? No Filed at: 04/08/2021 0825   Initial Alcohol Screen: US AUDIT-C    1  How often do you have a drink containing alcohol?  0 Filed at: 04/08/2021 0825   Audit-C Score  0 Filed at: 04/08/2021 0825   KULWANT: How many times in the past year have you    Used an illegal drug or used a prescription medication for non-medical reasons? Never Filed at: 04/08/2021 0825                    MDM  Number of Diagnoses or Management Options  Hand laceration: new and requires workup  Diagnosis management comments: 80M with injury to left hand, NVI without tendon injury    Not grossly infected however there is some mild erythema around the margins of wound  This finding is not all that concerning eye itself, however in the clinical context of lack of cleaning after dirty wound and fishing which raises concern for freshwater exposure, I think prophylactic antibiotics are indicated at this point rather than a more reactionary measure, especially since patient has already been reluctant to seek medical care  Wound cleaned with iodine water  He has 3 medium-sized skin tears the largest approximately 5 cm and very superficial without neurovascular injury  Due to delayed presentation skin was loosely approximated, Xeroform was applied over the wounds and 4x4s and Ale wrap applied  His ring was removed in the ER    They are comfortable with this plan  X-ray negative for fracture       Amount and/or Complexity of Data Reviewed  Review and summarize past medical records: yes  Independent visualization of images, tracings, or specimens: yes    Risk of Complications, Morbidity, and/or Mortality  Presenting problems: high  Diagnostic procedures: minimal  Management options: high        Disposition  Final diagnoses:   Hand laceration     Time reflects when diagnosis was documented in both MDM as applicable and the Disposition within this note     Time User Action Codes Description Comment    4/8/2021  8:42 AM Maria Ines Hawthorne Add [[id number]] Hand laceration       ED Disposition     ED Disposition Condition Date/Time Comment    Discharge Stable Thu Apr 8, 2021  8:49 AM Vanessa Bejarano discharge to home/self care              Follow-up Information     Follow up With Specialties Details Why Contact Saadia Roper, DO Family Medicine In 3 days For wound re-check [facility]  [phone]            Discharge Medication List as of 4/8/2021  8:49 AM      START taking these medications    Details   cephalexin (KEFLEX) 500 mg capsule Take 1 capsule (500 mg total) by mouth every 6 (six) hours for 7 days, Starting Thu 4/8/2021, Until Thu 4/15/2021, Normal         CONTINUE these medications which have NOT CHANGED    Details   ascorbic acid (VITAMIN C) 500 mg tablet Take 500 mg by mouth daily, Historical Med      atorvastatin (Lipitor) 10 mg tablet Take by mouth, Historical Med      cholecalciferol (VITAMIN D3) 400 units tablet Take 400 Units by mouth daily, Historical Med      ciprofloxacin-dexamethasone (CIPRODEX) otic suspension Administer 4 drops to the right ear 2 (two) times a day for 7 days, Starting Mon 7/8/2019, Until Mon 7/15/2019, Normal      co-enzyme Q-10 30 MG capsule Take 30 mg by mouth 3 (three) times a day, Historical Med      Multiple Vitamin (MULTIVITAMIN) tablet Take by mouth daily, Historical Med      Multiple Vitamins-Minerals (PRESERVISION AREDS 2 PO) Take by mouth, Historical Med           No discharge procedures on file      PDMP Review     None          ED Provider  Electronically Signed by           Shireen Figueroa DO  04/08/21 1120

## 2021-04-08 NOTE — DISCHARGE INSTRUCTIONS
Keep the bandage on for 24 hours  Apply neosporin to wounds 4 times daily starting tomorrow  Return if wound appears worse or if he develops symptoms of infection

## 2021-04-09 VITALS
BODY MASS INDEX: 26.22 KG/M2 | TEMPERATURE: 98 F | DIASTOLIC BLOOD PRESSURE: 79 MMHG | HEART RATE: 86 BPM | SYSTOLIC BLOOD PRESSURE: 127 MMHG | OXYGEN SATURATION: 96 % | WEIGHT: 188 LBS | RESPIRATION RATE: 16 BRPM

## 2021-04-15 ENCOUNTER — OFFICE VISIT (OUTPATIENT)
Dept: FAMILY MEDICINE CLINIC | Facility: CLINIC | Age: 81
End: 2021-04-15
Payer: MEDICARE

## 2021-04-15 VITALS
HEIGHT: 71 IN | WEIGHT: 191 LBS | TEMPERATURE: 96.6 F | SYSTOLIC BLOOD PRESSURE: 138 MMHG | DIASTOLIC BLOOD PRESSURE: 64 MMHG | BODY MASS INDEX: 26.74 KG/M2

## 2021-04-15 DIAGNOSIS — M79.642 HAND PAIN, LEFT: Primary | ICD-10-CM

## 2021-04-15 PROCEDURE — 99213 OFFICE O/P EST LOW 20 MIN: CPT | Performed by: FAMILY MEDICINE
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
## 2021-05-26 ENCOUNTER — HOSPITAL ENCOUNTER (EMERGENCY)
Facility: HOSPITAL | Age: 81
Discharge: HOME | End: 2021-05-26
Attending: EMERGENCY MEDICINE
Payer: COMMERCIAL

## 2021-05-26 VITALS
DIASTOLIC BLOOD PRESSURE: 78 MMHG | OXYGEN SATURATION: 97 % | WEIGHT: 191 LBS | RESPIRATION RATE: 16 BRPM | HEART RATE: 75 BPM | SYSTOLIC BLOOD PRESSURE: 135 MMHG | TEMPERATURE: 97.8 F | BODY MASS INDEX: 26.64 KG/M2

## 2021-05-26 DIAGNOSIS — T14.8XXA SKIN AVULSION: ICD-10-CM

## 2021-05-26 DIAGNOSIS — T14.8XXA ABRASION: ICD-10-CM
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
## 2021-05-26 PROCEDURE — 99284 EMERGENCY DEPT VISIT MOD MDM: CPT | Performed by: EMERGENCY MEDICINE

## 2021-05-26 PROCEDURE — 99283 EMERGENCY DEPT VISIT LOW MDM: CPT

## 2021-05-26 RX ORDER — GINSENG 100 MG
1 CAPSULE ORAL ONCE
Status: COMPLETED | OUTPATIENT
Start: 2021-05-26 | End: 2021-05-26

## 2021-05-26 RX ADMIN — BACITRACIN ZINC 1 SMALL APPLICATION: 500 OINTMENT TOPICAL at 23:11
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
## 2021-06-02 ENCOUNTER — OFFICE VISIT (OUTPATIENT)
Dept: FAMILY MEDICINE CLINIC | Facility: CLINIC | Age: 81
End: 2021-06-02
Payer: COMMERCIAL

## 2021-06-02 VITALS
OXYGEN SATURATION: 99 % | HEIGHT: 71 IN | HEART RATE: 69 BPM | SYSTOLIC BLOOD PRESSURE: 140 MMHG | DIASTOLIC BLOOD PRESSURE: 90 MMHG | WEIGHT: 185 LBS | TEMPERATURE: 98.4 F | BODY MASS INDEX: 25.9 KG/M2

## 2021-06-02 DIAGNOSIS — R03.0 BORDERLINE HYPERTENSION: ICD-10-CM

## 2021-06-02 DIAGNOSIS — Z72.820 POOR SLEEP: ICD-10-CM

## 2021-06-02 DIAGNOSIS — R45.0 NERVOUS: ICD-10-CM
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
## 2021-06-02 DIAGNOSIS — M79.605 LEFT LEG PAIN: ICD-10-CM

## 2021-06-02 DIAGNOSIS — M79.602 LEFT ARM PAIN: ICD-10-CM

## 2021-06-02 PROCEDURE — 99214 OFFICE O/P EST MOD 30 MIN: CPT | Performed by: FAMILY MEDICINE
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
## 2021-07-02 ENCOUNTER — APPOINTMENT (OUTPATIENT)
Dept: LAB | Facility: HOSPITAL | Age: 81
End: 2021-07-02
Attending: FAMILY MEDICINE
Payer: MEDICARE

## 2021-07-02 DIAGNOSIS — Z12.5 SCREENING FOR PROSTATE CANCER: ICD-10-CM

## 2021-07-02 DIAGNOSIS — E78.5 HYPERLIPIDEMIA, UNSPECIFIED HYPERLIPIDEMIA TYPE: ICD-10-CM

## 2021-07-02 LAB
ALBUMIN SERPL BCP-MCNC: 4 G/DL (ref 3.5–5.7)
ALP SERPL-CCNC: 75 U/L (ref 55–165)
ALT SERPL W P-5'-P-CCNC: 18 U/L (ref 7–52)
ANION GAP SERPL CALCULATED.3IONS-SCNC: 7 MMOL/L (ref 4–13)
AST SERPL W P-5'-P-CCNC: 17 U/L (ref 13–39)
BASOPHILS # BLD AUTO: 0 THOUSANDS/ΜL (ref 0–0.1)
BASOPHILS NFR BLD AUTO: 1 % (ref 0–2)
BILIRUB SERPL-MCNC: 0.6 MG/DL (ref 0.2–1)
BUN SERPL-MCNC: 19 MG/DL (ref 7–25)
CALCIUM SERPL-MCNC: 9 MG/DL (ref 8.6–10.5)
CHLORIDE SERPL-SCNC: 104 MMOL/L (ref 98–107)
CHOLEST SERPL-MCNC: 166 MG/DL (ref 0–200)
CO2 SERPL-SCNC: 27 MMOL/L (ref 21–31)
CREAT SERPL-MCNC: 0.83 MG/DL (ref 0.7–1.3)
EOSINOPHIL # BLD AUTO: 0.3 THOUSAND/ΜL (ref 0–0.61)
EOSINOPHIL NFR BLD AUTO: 5 % (ref 0–5)
ERYTHROCYTE [DISTWIDTH] IN BLOOD BY AUTOMATED COUNT: 13.7 % (ref 11.5–14.5)
GFR SERPL CREATININE-BSD FRML MDRD: 83 ML/MIN/1.73SQ M
GLUCOSE P FAST SERPL-MCNC: 94 MG/DL (ref 65–99)
HCT VFR BLD AUTO: 41.7 % (ref 42–47)
HDLC SERPL-MCNC: 53 MG/DL
HGB BLD-MCNC: 14.1 G/DL (ref 14–18)
LDLC SERPL CALC-MCNC: 102 MG/DL (ref 0–100)
LYMPHOCYTES # BLD AUTO: 1.4 THOUSANDS/ΜL (ref 0.6–4.47)
LYMPHOCYTES NFR BLD AUTO: 22 % (ref 21–51)
MCH RBC QN AUTO: 32.6 PG (ref 26–34)
MCHC RBC AUTO-ENTMCNC: 33.9 G/DL (ref 31–37)
MCV RBC AUTO: 96 FL (ref 81–99)
MONOCYTES # BLD AUTO: 0.7 THOUSAND/ΜL (ref 0.17–1.22)
MONOCYTES NFR BLD AUTO: 11 % (ref 2–12)
NEUTROPHILS # BLD AUTO: 4 THOUSANDS/ΜL (ref 1.4–6.5)
NEUTS SEG NFR BLD AUTO: 62 % (ref 42–75)
NONHDLC SERPL-MCNC: 113 MG/DL
PLATELET # BLD AUTO: 402 THOUSANDS/UL (ref 149–390)
PMV BLD AUTO: 7.7 FL (ref 8.6–11.7)
POTASSIUM SERPL-SCNC: 4.1 MMOL/L (ref 3.5–5.5)
PROT SERPL-MCNC: 7.3 G/DL (ref 6.4–8.9)
PSA SERPL-MCNC: 0.5 NG/ML (ref 0–4)
RBC # BLD AUTO: 4.34 MILLION/UL (ref 4.3–5.9)
SODIUM SERPL-SCNC: 138 MMOL/L (ref 134–143)
TRIGL SERPL-MCNC: 53 MG/DL (ref 44–166)
TSH SERPL DL<=0.05 MIU/L-ACNC: 4.18 UIU/ML (ref 0.45–5.33)
WBC # BLD AUTO: 6.5 THOUSAND/UL (ref 4.8–10.8)

## 2021-07-02 PROCEDURE — 84443 ASSAY THYROID STIM HORMONE: CPT

## 2021-07-02 PROCEDURE — 80061 LIPID PANEL: CPT

## 2021-07-02 PROCEDURE — 36415 COLL VENOUS BLD VENIPUNCTURE: CPT

## 2021-07-02 PROCEDURE — 80053 COMPREHEN METABOLIC PANEL: CPT

## 2021-07-02 PROCEDURE — 85025 COMPLETE CBC W/AUTO DIFF WBC: CPT

## 2021-07-02 PROCEDURE — G0103 PSA SCREENING: HCPCS

## 2021-07-08 ENCOUNTER — OFFICE VISIT (OUTPATIENT)
Dept: FAMILY MEDICINE CLINIC | Facility: CLINIC | Age: 81
End: 2021-07-08
Payer: MEDICARE

## 2021-07-08 VITALS
HEART RATE: 94 BPM | OXYGEN SATURATION: 97 % | HEIGHT: 71 IN | BODY MASS INDEX: 26.18 KG/M2 | SYSTOLIC BLOOD PRESSURE: 138 MMHG | DIASTOLIC BLOOD PRESSURE: 76 MMHG | TEMPERATURE: 97 F | WEIGHT: 187 LBS

## 2021-07-08 DIAGNOSIS — Z23 ENCOUNTER FOR IMMUNIZATION: ICD-10-CM

## 2021-07-08 DIAGNOSIS — E78.00 HYPERCHOLESTEROLEMIA: ICD-10-CM

## 2021-07-08 DIAGNOSIS — Z00.00 MEDICARE ANNUAL WELLNESS VISIT, SUBSEQUENT: Primary | ICD-10-CM

## 2021-07-08 PROCEDURE — G0438 PPPS, INITIAL VISIT: HCPCS | Performed by: FAMILY MEDICINE

## 2021-07-08 PROCEDURE — 99213 OFFICE O/P EST LOW 20 MIN: CPT | Performed by: FAMILY MEDICINE

## 2021-07-08 PROCEDURE — 90732 PPSV23 VACC 2 YRS+ SUBQ/IM: CPT | Performed by: FAMILY MEDICINE

## 2021-07-08 PROCEDURE — 1123F ACP DISCUSS/DSCN MKR DOCD: CPT | Performed by: FAMILY MEDICINE

## 2021-07-08 PROCEDURE — G0009 ADMIN PNEUMOCOCCAL VACCINE: HCPCS | Performed by: FAMILY MEDICINE

## 2021-07-08 RX ORDER — SIMVASTATIN 10 MG
10 TABLET ORAL
COMMUNITY
End: 2021-09-17

## 2021-07-08 NOTE — PROGRESS NOTES
Assessment and Plan:     Problem List Items Addressed This Visit        Other    Hypercholesterolemia    Relevant Medications    simvastatin (ZOCOR) 10 mg tablet      Other Visit Diagnoses     Medicare annual wellness visit, subsequent    -  Primary    Encounter for immunization               Preventive health issues were discussed with patient, and age appropriate screening tests were ordered as noted in patient's After Visit Summary  Personalized health advice and appropriate referrals for health education or preventive services given if needed, as noted in patient's After Visit Summary  History of Present Illness:     Patient presents for Medicare Annual Wellness visit    Patient Care Team:  Abdelrahman Ralph DO as PCP - General (Family Medicine)     Problem List:     Patient Active Problem List   Diagnosis    Hypercholesterolemia    Borderline hypertension    Overweight with body mass index (BMI) of 26 to 26 9 in adult    Negative depression screening    Overweight (BMI 25 0-29  9)      Past Medical and Surgical History:     Past Medical History:   Diagnosis Date    Encounter for general adult medical examination without abnormal findings 3/20/2019    Hyperlipidemia      Past Surgical History:   Procedure Laterality Date    HERNIA REPAIR        Family History:     History reviewed  No pertinent family history     Social History:     Social History     Socioeconomic History    Marital status: /Civil Union     Spouse name: None    Number of children: None    Years of education: None    Highest education level: None   Occupational History    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[employer]   Tobacco Use    Smoking status: Former Smoker    Smokeless tobacco: Never Used    Tobacco comment: Smoked as a teenager   Vaping Use    Vaping Use: Never used   Substance and Sexual Activity    Alcohol use: Yes     Comment: Rare    Drug use: No    Sexual activity: None   Other Topics Concern    None   Social History Narrative    None     Social Determinants of Health     Financial Resource Strain:     Difficulty of Paying Living Expenses:    Food Insecurity:     Worried About Running Out of Food in the Last Year:     [address] in the Last Year:    Transportation Needs:     Lack of Transportation (Medical):  Lack of Transportation (Non-Medical):    Physical Activity:     Days of Exercise per Week:     Minutes of Exercise per Session:    Stress:     Feeling of Stress :    Social Connections:     Frequency of Communication with Friends and Family:     Frequency of Social Gatherings with Friends and Family:     Attends Gnostic[employer] Services:     Active Member of Clubs or Organizations:     Attends Club or Organization Meetings:     Marital Status:    Intimate Partner Violence:     Fear of Current or Ex-Partner:     Emotionally Abused:     Physically Abused:     Sexually Abused:       Medications and Allergies:     Current Outpatient Medications   Medication Sig Dispense Refill    cholecalciferol (VITAMIN D3) 400 units tablet Take 400 Units by mouth daily      co-enzyme Q-10 30 MG capsule Take 30 mg by mouth 3 (three) times a day      Multiple Vitamins-Minerals (PRESERVISION AREDS 2 PO) Take by mouth      simvastatin (ZOCOR) 10 mg tablet Take 10 mg by mouth daily at bedtime      ascorbic acid (VITAMIN C) 500 mg tablet Take 500 mg by mouth daily (Patient not taking: Reported on 7/8/2021)      atorvastatin (Lipitor) 10 mg tablet Take by mouth (Patient not taking: Reported on 7/8/2021)      ciprofloxacin-dexamethasone (CIPRODEX) otic suspension Administer 4 drops to the right ear 2 (two) times a day for 7 days (Patient not taking: Reported on 7/15/2020) 3 mL 0    Multiple Vitamin (MULTIVITAMIN) tablet Take by mouth daily (Patient not taking: Reported on 7/8/2021)       No current facility-administered medications for this visit       No Known Allergies   Immunizations:     Immunization History Administered Date(s) Administered    INFLUENZA 10/11/2017, 09/05/2019    Influenza, high dose seasonal 0 7 mL 09/20/2018, 10/12/2020    Pneumococcal 11/12/2012, 01/06/2017    Pneumococcal Conjugate 13-Valent 06/12/2020    SARS-CoV-2 / COVID-19 mRNA IM (Moderna) 01/19/2021, 02/17/2021    Tdap 04/08/2021      Health Maintenance: There are no preventive care reminders to display for this patient  Topic Date Due    Pneumococcal Vaccine: 65+ Years (2 of 2 - PPSV23) 06/12/2021    Influenza Vaccine (1) 09/01/2021      Medicare Health Risk Assessment:     /76 (BP Location: Left arm, Patient Position: Sitting)   Pulse 94   Temp (!) 97 °F (36 1 °C) (Tympanic)   Ht 5' 11" (1 803 m)   Wt 84 8 kg (187 lb)   SpO2 97%   BMI 26 08 kg/m²      Max Wolf is here for his Subsequent Wellness visit  Last Medicare Wellness visit information reviewed, patient interviewed and updates made to the record today  Health Risk Assessment:   Patient rates overall health as good  Patient feels that their physical health rating is same  Patient is satisfied with their life  Eyesight was rated as same  Hearing was rated as slightly worse  Patient feels that their emotional and mental health rating is same  Patients states they are never, rarely angry  Patient states they are sometimes unusually tired/fatigued  Pain experienced in the last 7 days has been none  Patient states that he has experienced no weight loss or gain in last 6 months  Depression Screening:   PHQ-2 Score: 0      Fall Risk Screening: In the past year, patient has experienced: no history of falling in past year      Home Safety:  Patient does not have trouble with stairs inside or outside of their home  Patient has working smoke alarms and has working carbon monoxide detector  Home safety hazards include: none  Nutrition:   Current diet is Regular  Medications:   Patient is currently taking over-the-counter supplements   OTC medications include: see medication list  Patient is able to manage medications  Activities of Daily Living (ADLs)/Instrumental Activities of Daily Living (IADLs):   Walk and transfer into and out of bed and chair?: Yes  Dress and groom yourself?: Yes    Bathe or shower yourself?: Yes    Feed yourself? Yes  Do your laundry/housekeeping?: No  Manage your money, pay your bills and track your expenses?: Yes  Make your own meals?: No    Do your own shopping?: Yes    Previous Hospitalizations:   Any hospitalizations or ED visits within the last 12 months?: Yes    How many hospitalizations have you had in the last year?: 1-2    Advance Care Planning:   Living will: Yes    Durable POA for healthcare: Yes    Advanced directive: Yes    Advanced directive counseling given: No    Five wishes given: No    Patient declined ACP directive: No    End of Life Decisions reviewed with patient: Yes    Provider agrees with end of life decisions: Yes      Cognitive Screening:   Provider or family/friend/caregiver concerned regarding cognition?: No    PREVENTIVE SCREENINGS      Cardiovascular Screening:    General: Screening Not Indicated and History Lipid Disorder      Diabetes Screening:     General: Screening Current      Colorectal Cancer Screening:     General: Screening Not Indicated      Prostate Cancer Screening:    General: Screening Not Indicated      Osteoporosis Screening:    General: Screening Not Indicated      Abdominal Aortic Aneurysm (AAA) Screening:    Risk factors include: tobacco use        General: Screening Not Indicated      Lung Cancer Screening:     General: Screening Not Indicated      Hepatitis C Screening:    General: Screening Not Indicated    Screening, Brief Intervention, and Referral to Treatment (SBIRT)    Screening  Typical number of drinks in a day: 0  Typical number of drinks in a week: 0  Interpretation: Low risk drinking behavior      Single Item Drug Screening:  How often have you used an illegal drug (including marijuana) or a prescription medication for non-medical reasons in the past year? never    Single Item Drug Screen Score: 0  Interpretation: Negative screen for possible drug use disorder      Hannah Mckeon, DO

## 2021-07-08 NOTE — PATIENT INSTRUCTIONS

## 2021-07-08 NOTE — PROGRESS NOTES
Assessment/Plan:    No problem-specific Assessment & Plan notes found for this encounter  Diagnoses and all orders for this visit:    Medicare annual wellness visit, subsequent    Hypercholesterolemia  Comments:  pt counseled on diet and exercise    Encounter for immunization  -     PNEUMOCOCCAL POLYSACCHARIDE VACCINE 23-VALENT =>3YO SQ IM    Other orders  -     simvastatin (ZOCOR) 10 mg tablet; Take 10 mg by mouth daily at bedtime          PHQ-9 Depression Screening    PHQ-9:   Frequency of the following problems over the past two weeks:      Little interest or pleasure in doing things: 0 - not at all  Feeling down, depressed, or hopeless: 0 - not at all  PHQ-2 Score: 0            Subjective:      Patient ID: Kala Tilley is a 79 y o  male  Hyperlipidemia  This is a chronic problem  The current episode started more than 1 year ago  The problem is uncontrolled  Recent lipid tests were reviewed and are high  Factors aggravating his hyperlipidemia include fatty foods  Pertinent negatives include no chest pain, myalgias or shortness of breath  Current antihyperlipidemic treatment includes statins  The current treatment provides moderate improvement of lipids  Compliance problems include adherence to exercise and adherence to diet  The following portions of the patient's history were reviewed and updated as appropriate: allergies, current medications, past family history, past medical history, past social history, past surgical history and problem list     Review of Systems   Constitutional: Negative  Negative for chills, fatigue and fever  HENT: Negative  Negative for hearing loss  Eyes: Negative  Negative for visual disturbance  Respiratory: Negative for shortness of breath and wheezing  Cardiovascular: Negative for chest pain and palpitations  Gastrointestinal: Negative for abdominal pain, blood in stool, constipation, diarrhea, nausea and vomiting  Endocrine: Negative  Genitourinary: Negative for difficulty urinating and dysuria  Musculoskeletal: Negative for arthralgias and myalgias  Skin: Negative  Allergic/Immunologic: Negative  Neurological: Negative for seizures and syncope  Hematological: Negative for adenopathy  Psychiatric/Behavioral: Negative  Objective:    /76 (BP Location: Left arm, Patient Position: Sitting)   Pulse 94   Temp (!) 97 °F (36 1 °C) (Tympanic)   Ht 5' 11" (1 803 m)   Wt 84 8 kg (187 lb)   SpO2 97%   BMI 26 08 kg/m²      Physical Exam  Vitals and nursing note reviewed  Constitutional:       General: He is not in acute distress  Appearance: Normal appearance  He is well-developed  He is not ill-appearing, toxic-appearing or diaphoretic  HENT:      Head: Normocephalic and atraumatic  Right Ear: Tympanic membrane, ear canal and external ear normal  There is no impacted cerumen  Left Ear: Tympanic membrane, ear canal and external ear normal  There is no impacted cerumen  Nose: Nose normal  No congestion or rhinorrhea  Mouth/Throat:      Mouth: Mucous membranes are moist       Pharynx: Oropharynx is clear  No oropharyngeal exudate or posterior oropharyngeal erythema  Eyes:      General: No scleral icterus  Right eye: No discharge  Left eye: No discharge  Extraocular Movements: Extraocular movements intact  Conjunctiva/sclera: Conjunctivae normal       Pupils: Pupils are equal, round, and reactive to light  Cardiovascular:      Rate and Rhythm: Normal rate and regular rhythm  Pulses: Normal pulses  Heart sounds: Normal heart sounds  No murmur heard  No friction rub  No gallop  Pulmonary:      Effort: Pulmonary effort is normal  No respiratory distress  Breath sounds: Normal breath sounds  No wheezing, rhonchi or rales  Abdominal:      General: Bowel sounds are normal  There is no distension  Palpations: Abdomen is soft  There is no mass  Tenderness: There is no abdominal tenderness  There is no guarding or rebound  Musculoskeletal:         General: No swelling or deformity  Normal range of motion  Cervical back: Normal range of motion and neck supple  No rigidity  Right lower leg: No edema  Left lower leg: No edema  Lymphadenopathy:      Cervical: No cervical adenopathy  Skin:     General: Skin is warm and dry  Findings: No rash  Neurological:      General: No focal deficit present  Mental Status: He is alert and oriented to person, place, and time  Sensory: No sensory deficit  Motor: No weakness  Coordination: Coordination normal       Gait: Gait normal       Deep Tendon Reflexes: Reflexes are normal and symmetric  Reflexes normal    Psychiatric:         Mood and Affect: Mood normal          Behavior: Behavior normal          Thought Content:  Thought content normal          Judgment: Judgment normal

## 2021-09-02 ENCOUNTER — OFFICE VISIT (OUTPATIENT)
Dept: FAMILY MEDICINE CLINIC | Facility: CLINIC | Age: 81
End: 2021-09-02
Payer: MEDICARE

## 2021-09-02 VITALS
HEIGHT: 71 IN | HEART RATE: 80 BPM | OXYGEN SATURATION: 100 % | DIASTOLIC BLOOD PRESSURE: 82 MMHG | BODY MASS INDEX: 26.32 KG/M2 | WEIGHT: 188 LBS | SYSTOLIC BLOOD PRESSURE: 138 MMHG | TEMPERATURE: 97.7 F

## 2021-09-02 DIAGNOSIS — F01.50 ISCHEMIC VASCULAR DEMENTIA (HCC): ICD-10-CM

## 2021-09-02 DIAGNOSIS — M19.041 PRIMARY OSTEOARTHRITIS OF RIGHT HAND: ICD-10-CM

## 2021-09-02 DIAGNOSIS — M25.473 MILD ANKLE EDEMA: ICD-10-CM

## 2021-09-02 DIAGNOSIS — M79.641 RIGHT HAND PAIN: Primary | ICD-10-CM

## 2021-09-02 PROCEDURE — 99214 OFFICE O/P EST MOD 30 MIN: CPT | Performed by: FAMILY MEDICINE

## 2021-09-02 RX ORDER — HYDROCHLOROTHIAZIDE 25 MG/1
25 TABLET ORAL DAILY
Qty: 30 TABLET | Refills: 3 | Status: SHIPPED | OUTPATIENT
Start: 2021-09-02 | End: 2021-11-05 | Stop reason: ALTCHOICE

## 2021-09-02 RX ORDER — MELOXICAM 15 MG/1
15 TABLET ORAL DAILY
Qty: 30 TABLET | Refills: 5 | Status: SHIPPED | OUTPATIENT
Start: 2021-09-02 | End: 2021-09-17

## 2021-09-02 NOTE — PROGRESS NOTES
Assessment/Plan:    No problem-specific Assessment & Plan notes found for this encounter  Diagnoses and all orders for this visit:    Right hand pain  -     XR hand 3+ vw right; Future  -     EMG 1 Limb; Future  -     meloxicam (MOBIC) 15 mg tablet; Take 1 tablet (15 mg total) by mouth daily    Primary osteoarthritis of right hand  -     meloxicam (MOBIC) 15 mg tablet; Take 1 tablet (15 mg total) by mouth daily    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HCC)    Mild ankle edema  -     hydrochlorothiazide (HYDRODIURIL) 25 mg tablet; Take 1 tablet (25 mg total) by mouth daily  -     Basic metabolic panel; Future        Patient Instructions   Recheck 4 weeks with BMP prior to visit to ensure renal function and electrolytes remain stable on HCTZ  Evaluate x-rays and EMG - likely CTS      PHQ-9 Depression Screening    PHQ-9:   Frequency of the following problems over the past two weeks:      Little interest or pleasure in doing things: 1 - several days  Feeling down, depressed, or hopeless: 1 - several days  PHQ-2 Score: 2            Subjective:      Patient ID: Padmini Bhakta is a 79 y o  male  Presents with complaint of pain in the thumb, index and middle finger that prevents him from sleeping  Patient is very [facility] and it is difficult to communicate, he describes the pain as an ache  No N/T  No pain any other time except when he lies down  He has had this for one month, and it bothers him every time he lies down  He was a  his whole life  R  Hand dominant  No pain on the left side  No neck pain  He has swelling in both feet/ankles for one month  He denies CP, sob, palpitations, dizziness, lightheadedness or syncope  He does not sit or stand for long periods  Swelling has not worsened over the month  Some days better and some days worse  He denies feeling depressed or anxious        The following portions of the patient's history were reviewed and updated as appropriate: allergies, current medications, past family history, past medical history, past social history, past surgical history and problem list     Review of Systems   Constitutional: Negative  Negative for fatigue  Eyes: Negative  Respiratory: Negative for cough, chest tightness, wheezing and stridor  Cardiovascular: Positive for leg swelling  Negative for chest pain and palpitations  Gastrointestinal: Negative  Musculoskeletal: Positive for arthralgias  Negative for neck pain  Neurological: Negative for dizziness, tremors, weakness, light-headedness, numbness and headaches  Psychiatric/Behavioral: Negative for dysphoric mood  The patient is not nervous/anxious  Objective:    /82   Pulse 80   Temp 97 7 °F (36 5 °C)   Ht 5' 11" (1 803 m)   Wt 85 3 kg (188 lb)   SpO2 100%   BMI 26 22 kg/m²      Physical Exam  Vitals and nursing note reviewed  Constitutional:       General: He is not in acute distress  Appearance: Normal appearance  He is not ill-appearing or toxic-appearing  Cardiovascular:      Rate and Rhythm: Normal rate and regular rhythm  Pulses: Normal pulses  Heart sounds: Normal heart sounds  No murmur heard  Pulmonary:      Effort: Pulmonary effort is normal  No respiratory distress  Breath sounds: Normal breath sounds  No wheezing  Musculoskeletal:         General: Tenderness present  Cervical back: Normal range of motion and neck supple  Right lower leg: Edema present  Left lower leg: Edema present  Comments: trace pitting edema ankle and foot bilaterally    No pre-tibial edema    R  Wrist weakness, difficulty making a fist, +tinel's and phalens   Skin:     General: Skin is warm and dry  Neurological:      Mental Status: He is alert  Psychiatric:         Mood and Affect: Mood normal          Behavior: Behavior normal          Thought Content:  Thought content normal

## 2021-09-02 NOTE — PATIENT INSTRUCTIONS
Recheck 4 weeks with BMP prior to visit to ensure renal function and electrolytes remain stable on HCTZ  Evaluate x-rays and EMG - likely CTS

## 2021-09-03 ENCOUNTER — HOSPITAL ENCOUNTER (OUTPATIENT)
Dept: NEUROLOGY | Facility: CLINIC | Age: 81
Discharge: HOME | End: 2021-09-03
Payer: MEDICARE

## 2021-09-03 DIAGNOSIS — M79.641 RIGHT HAND PAIN: ICD-10-CM

## 2021-09-03 PROCEDURE — 95909 NRV CNDJ TST 5-6 STUDIES: CPT | Performed by: PSYCHIATRY & NEUROLOGY

## 2021-09-03 PROCEDURE — 95886 MUSC TEST DONE W/N TEST COMP: CPT | Performed by: PSYCHIATRY & NEUROLOGY

## 2021-09-07 ENCOUNTER — HOSPITAL ENCOUNTER (OUTPATIENT)
Dept: RADIOLOGY | Facility: HOSPITAL | Age: 81
Discharge: HOME | End: 2021-09-07
Payer: MEDICARE

## 2021-09-07 ENCOUNTER — APPOINTMENT (OUTPATIENT)
Dept: LAB | Facility: HOSPITAL | Age: 81
End: 2021-09-07
Payer: MEDICARE

## 2021-09-07 DIAGNOSIS — M79.641 RIGHT HAND PAIN: ICD-10-CM

## 2021-09-07 DIAGNOSIS — M25.473 MILD ANKLE EDEMA: ICD-10-CM

## 2021-09-07 LAB
ANION GAP SERPL CALCULATED.3IONS-SCNC: 10 MMOL/L (ref 4–13)
BUN SERPL-MCNC: 17 MG/DL (ref 7–25)
CALCIUM SERPL-MCNC: 9.4 MG/DL (ref 8.6–10.5)
CHLORIDE SERPL-SCNC: 100 MMOL/L (ref 98–107)
CO2 SERPL-SCNC: 28 MMOL/L (ref 21–31)
CREAT SERPL-MCNC: 0.8 MG/DL (ref 0.7–1.3)
GFR SERPL CREATININE-BSD FRML MDRD: 83 ML/MIN/1.73SQ M
GLUCOSE P FAST SERPL-MCNC: 92 MG/DL (ref 65–99)
POTASSIUM SERPL-SCNC: 3.8 MMOL/L (ref 3.5–5.5)
SODIUM SERPL-SCNC: 138 MMOL/L (ref 134–143)

## 2021-09-07 PROCEDURE — 80048 BASIC METABOLIC PNL TOTAL CA: CPT

## 2021-09-07 PROCEDURE — 73130 X-RAY EXAM OF HAND: CPT

## 2021-09-07 PROCEDURE — 36415 COLL VENOUS BLD VENIPUNCTURE: CPT

## 2021-09-08 DIAGNOSIS — G56.00 CARPAL TUNNEL SYNDROME, UNSPECIFIED LATERALITY: Primary | ICD-10-CM

## 2021-09-14 ENCOUNTER — OFFICE VISIT (OUTPATIENT)
Dept: FAMILY MEDICINE CLINIC | Facility: CLINIC | Age: 81
End: 2021-09-14
Payer: MEDICARE

## 2021-09-14 VITALS
BODY MASS INDEX: 26.46 KG/M2 | DIASTOLIC BLOOD PRESSURE: 72 MMHG | HEIGHT: 71 IN | HEART RATE: 70 BPM | TEMPERATURE: 97.8 F | WEIGHT: 189 LBS | SYSTOLIC BLOOD PRESSURE: 136 MMHG | OXYGEN SATURATION: 97 %

## 2021-09-14 DIAGNOSIS — G56.01 CARPAL TUNNEL SYNDROME ON RIGHT: Primary | ICD-10-CM

## 2021-09-14 DIAGNOSIS — G47.00 INSOMNIA, UNSPECIFIED TYPE: ICD-10-CM

## 2021-09-14 PROCEDURE — 99213 OFFICE O/P EST LOW 20 MIN: CPT | Performed by: FAMILY MEDICINE

## 2021-09-14 RX ORDER — TRAMADOL HYDROCHLORIDE 50 MG/1
50 TABLET ORAL 2 TIMES DAILY PRN
Qty: 15 TABLET | Refills: 0 | Status: SHIPPED | OUTPATIENT
Start: 2021-09-14 | End: 2021-11-05 | Stop reason: ALTCHOICE

## 2021-09-14 NOTE — PROGRESS NOTES
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  He will see Dr Alba Haskins tomorrow and likely have surgery at [facility]  He is still having difficulty sleeping  The following portions of the patient's history were reviewed and updated as appropriate: allergies, current medications, past family history, past medical history, past social history, past surgical history and problem list     Review of Systems   Neurological: Positive for numbness  Objective:    /72   Pulse 70   Temp 97 8 °F (36 6 °C)   Ht 5' 11" (1 803 m)   Wt 85 7 kg (189 lb)   SpO2 97%   BMI 26 36 kg/m²      Physical Exam  Vitals and nursing note reviewed  Constitutional:       General: He is not in acute distress  Appearance: Normal appearance  He is not ill-appearing  Musculoskeletal:         General: Tenderness present  No swelling  Neurological:      Mental Status: He is alert  Sensory: Sensory deficit present  Psychiatric:         Mood and Affect: Mood normal          Behavior: Behavior normal          Thought Content:  Thought content normal          Judgment: Judgment normal

## 2021-09-15 ENCOUNTER — PREP FOR PROCEDURE (OUTPATIENT)
Dept: OBGYN CLINIC | Facility: CLINIC | Age: 81
End: 2021-09-15

## 2021-09-15 ENCOUNTER — OFFICE VISIT (OUTPATIENT)
Dept: OBGYN CLINIC | Facility: CLINIC | Age: 81
End: 2021-09-15
Payer: MEDICARE

## 2021-09-15 VITALS
DIASTOLIC BLOOD PRESSURE: 68 MMHG | BODY MASS INDEX: 26.18 KG/M2 | HEIGHT: 71 IN | WEIGHT: 187 LBS | HEART RATE: 74 BPM | SYSTOLIC BLOOD PRESSURE: 132 MMHG

## 2021-09-15 DIAGNOSIS — G56.01 RIGHT CARPAL TUNNEL SYNDROME: ICD-10-CM

## 2021-09-15 DIAGNOSIS — Z01.812 PRE-OPERATIVE LABORATORY EXAMINATION: Primary | ICD-10-CM

## 2021-09-15 DIAGNOSIS — M79.641 RIGHT HAND PAIN: ICD-10-CM

## 2021-09-15 DIAGNOSIS — G56.01 RIGHT CARPAL TUNNEL SYNDROME: Primary | ICD-10-CM

## 2021-09-15 PROCEDURE — 99203 OFFICE O/P NEW LOW 30 MIN: CPT | Performed by: ORTHOPAEDIC SURGERY

## 2021-09-15 RX ORDER — CHLORHEXIDINE GLUCONATE 4 G/100ML
SOLUTION TOPICAL DAILY PRN
Status: CANCELLED | OUTPATIENT
Start: 2021-09-15

## 2021-09-15 RX ORDER — CEFAZOLIN SODIUM 2 G/50ML
2000 SOLUTION INTRAVENOUS ONCE
Status: CANCELLED | OUTPATIENT
Start: 2021-09-20 | End: 2021-09-15

## 2021-09-15 NOTE — PROGRESS NOTES
Chief Complaint  Right hand pins and needles numbness    History Of Presenting Illness  Jaimee Rivas 1940 presents with right hand pins and needles numbness for the last 3 months  Mainly affects the radial 3 fingers  Patient has difficulty sleeping at night  Patient has longstanding deformity of his fingers secondary to arthritis  Patient comes in for evaluation with the EMG studies  Patient is hard of hearing and his wife does most of the interpretation for him  Current Medications  Current Outpatient Medications   Medication Sig Dispense Refill    ascorbic acid (VITAMIN C) 500 mg tablet Take 500 mg by mouth daily       atorvastatin (Lipitor) 10 mg tablet Take by mouth       cholecalciferol (VITAMIN D3) 400 units tablet Take 400 Units by mouth daily      co-enzyme Q-10 30 MG capsule Take 30 mg by mouth 3 (three) times a day      hydrochlorothiazide (HYDRODIURIL) 25 mg tablet Take 1 tablet (25 mg total) by mouth daily 30 tablet 3    meloxicam (MOBIC) 15 mg tablet Take 1 tablet (15 mg total) by mouth daily 30 tablet 5    Multiple Vitamin (MULTIVITAMIN) tablet Take by mouth daily       Multiple Vitamins-Minerals (PRESERVISION AREDS 2 PO) Take by mouth      simvastatin (ZOCOR) 10 mg tablet Take 10 mg by mouth daily at bedtime      simvastatin (ZOCOR) 20 mg tablet Take 20 mg by mouth daily at bedtime      traMADol (ULTRAM) 50 mg tablet Take 1 tablet (50 mg total) by mouth 2 (two) times a day as needed for severe pain 15 tablet 0     No current facility-administered medications for this visit         Current Problems    Active Problems:   Patient Active Problem List    Diagnosis Date Noted    Right hand pain     Carpal tunnel syndrome on right     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[facility] [address] ) 09/02/2021    Overweight (BMI 25 0-29 9) 01/27/2021    Negative depression screening 09/26/2019    Overweight with body mass index (BMI) of 26 to 26 9 in adult 08/06/2019    Medicare annual wellness visit, subsequent 03/20/2019    Hypercholesterolemia 07/12/2018    Borderline hypertension 07/12/2018         Review of Systems:    General: negative for - chills, fatigue, fever,  weight gain or weight loss  Psychological: negative for - anxiety, behavioral disorder, concentration difficulties  Ophthalmic: negative for - blurry vision, decreased vision, double vision,      Past Medical History:   Past Medical History:   Diagnosis Date    Encounter for general adult medical examination without abnormal findings 3/20/2019    Hyperlipidemia        Past Surgical History:   Past Surgical History:   Procedure Laterality Date    HERNIA REPAIR         Family History:  Family history reviewed and non-contributory  No family history on file  Social History:  Social History     Socioeconomic History    Marital status: /Civil Union     Spouse name: None    Number of children: None    Years of education: None    Highest education level: None   Occupational History    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[employer]   Tobacco Use    Smoking status: Former Smoker    Smokeless tobacco: Never Used    Tobacco comment: Smoked as a teenager   Vaping Use    Vaping Use: Never used   Substance and Sexual Activity    Alcohol use: Yes     Comment: Rare    Drug use: No    Sexual activity: None   Other Topics Concern    None   Social History Narrative    None     Social Determinants of Health     Financial Resource Strain:     Difficulty of Paying Living Expenses:    Food Insecurity:     Worried About Running Out of Food in the Last Year:     Ran Out of Food in the Last Year:    Transportation Needs:     Lack of Transportation (Medical):      Lack of Transportation (Non-Medical):    Physical Activity:     Days of Exercise per Week:     Minutes of Exercise per Session:    Stress:     Feeling of Stress :    Social Connections:     Frequency of Communication with Friends and Family:     Frequency of Social Gatherings with Friends and Family:     Attends Rastafari Services:     Active Member of Clubs or Organizations:     Attends Club or Organization Meetings:     Marital Status:    Intimate Partner Violence:     Fear of Current or Ex-Partner:     Emotionally Abused:     Physically Abused:     Sexually Abused: Allergies:   No Known Allergies        Physical ExaminationBP 132/68   Pulse 74   Ht 5' 11" (1 803 m)   Wt 84 8 kg (187 lb)   BMI 26 08 kg/m²   Gen: Alert and oriented to person, place, time  HEENT: EOMI, eyes clear, moist mucus membranes, hearing intact  [employer], RS, abdominal system normal to examine    Orthopedic Exam  Right hand no obvious wasting deformities of multiple fingers 2nd root D IP joint arthritis  Tinel sign Phalen sign positive of the right wrist EMG study shows severe carpal tunnel compression right wrist          Impression  Severe symptomatic right carpal tunnel syndrome          Plan    Discussed treatment options including nonoperative and operative, the wife and the patient feels that like to proceed with surgery his symptoms are not allowing him to sleep and I think this is reasonable given the degree of compression of the EMG    Carpal tunnel syndrome    The anatomy and physiology of carpal tunnel syndrome was discussed with the patient today  Increased pressure localized under the transverse carpal ligament can cause pain and numbness tingling or dysesthesias, within the median nerve distribution as well as feelings of fatigue clumsiness or awkwardness  These symptoms typically occur  at  night and worse in the morning upon waking  Eventually untreated carpal tunnel syndrome can result in weakness or permanent loss of function within the thenar compartment of the hand  Treatment options were discussed with the patient    Conservative treatment includes nocturnal resting splints, to keep the nerve in a neutral position, ergonomic changes within the work or home environment, activity modification and tendon gliding exercises  Steroid injections within the carpal canal can help a  majority of patients, However yhis Is often self-limiting in a majority of patients  Surgical Intervention to divide the Transverse Carpal Ligament typically results in a  Long-Lasting Relief of the patient's complaints with a recurrence rate of Less Than 1%  Patient Has elected to undergo surgical Release of the Transverse Carpal Ligament  Patient Has elected to undergo OPEN Carpal Tunnel Release  In the postoperative period, Light Activities are  allowed Immediately, Driving Is allowed when Narcotic medication has stopped, and the Incision May Get Wet after 5 Days  Heavy Activities like lifting more Than 10 Pounds will be allowed after  approximately 21 Days  When the pain and discomfort in the hands generally  improves rapidly, the numbness and tingling as well as the strength will slowly improve over Weeks to Months Depending on the Severity of the Carpal Tunnel Syndrome  Pillar Pain Was Discussed with the Patient Which Is Typically a Common Self-Limiting Conditions  The Risks of Bleeding and Infection from the Surgery Are Less Than 1%  Risk of Recurrence Is Approximately 0 5%  The Risk of Nerve Injury and Nerve Damage or Damage to the Blood Vessels Is Approximately One in 1200  The Patient Has an Understanding of the above Mentioned Discussion  The Risks and Benefits of the Procedure Were Explained to the Patient Which Include, but Are Not Limited to, Bleeding, Infection, Recurrence, Pain, Scar, Damage to Tendons, Damage to Nerves and Damage to Blood Vessels and Complications Related to Anesthesia  These Risks along with Alternative Conservative Treatment Options and Postoperative Protocols Were voiced Back and Understood by the Patient  All Questions Were Answered to the Patient's Satisfaction  The Patient Agrees to Comply with the Standard Postoperative Protocol, and Is Willing to Proceed    Education Was Provided Via written and auditory  Forms  There were  No Barriers to Learning  Written Handouts regarding Wound Care Incision and Scar Care and General Preoperative Information Was Provided to the Patient  Prior to Surgery, the Patient Is Requested to Stop All Anti-Inflammatory Medications  Prophylactic Aspirin Plavix and Coumadin Allowed to Be Continued up to 5 Days before the Procedure  Hypertensive Medications and Beta Blockers If They Can Should Be Continued  Arnel Woodruff MD        Portions of the record may have been created with voice recognition software  Occasional wrong word or "sound a like" substitutions may have occurred due to the inherent limitations of voice recognition software  Read the chart carefully and recognize, using context, where substitutions have occurred

## 2021-09-15 NOTE — H&P
Chief Complaint  Right hand pins and needles numbness    History Of Presenting Illness  Ginny Jean 1940 presents with right hand pins and needles numbness for the last 3 months  Mainly affects the radial 3 fingers  Patient has difficulty sleeping at night  Patient has longstanding deformity of his fingers secondary to arthritis  Patient comes in for evaluation with the EMG studies  Patient is hard of hearing and his wife does most of the interpretation for him  Current Medications  Current Outpatient Medications   Medication Sig Dispense Refill    ascorbic acid (VITAMIN C) 500 mg tablet Take 500 mg by mouth daily       atorvastatin (Lipitor) 10 mg tablet Take by mouth       cholecalciferol (VITAMIN D3) 400 units tablet Take 400 Units by mouth daily      co-enzyme Q-10 30 MG capsule Take 30 mg by mouth 3 (three) times a day      hydrochlorothiazide (HYDRODIURIL) 25 mg tablet Take 1 tablet (25 mg total) by mouth daily 30 tablet 3    meloxicam (MOBIC) 15 mg tablet Take 1 tablet (15 mg total) by mouth daily 30 tablet 5    Multiple Vitamin (MULTIVITAMIN) tablet Take by mouth daily       Multiple Vitamins-Minerals (PRESERVISION AREDS 2 PO) Take by mouth      simvastatin (ZOCOR) 10 mg tablet Take 10 mg by mouth daily at bedtime      simvastatin (ZOCOR) 20 mg tablet Take 20 mg by mouth daily at bedtime      traMADol (ULTRAM) 50 mg tablet Take 1 tablet (50 mg total) by mouth 2 (two) times a day as needed for severe pain 15 tablet 0     No current facility-administered medications for this visit         Current Problems    Active Problems:   Patient Active Problem List    Diagnosis Date Noted    Right hand pain     Carpal tunnel syndrome on right     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[facility] [address] ) 09/02/2021    Overweight (BMI 25 0-29 9) 01/27/2021    Negative depression screening 09/26/2019    Overweight with body mass index (BMI) of 26 to 26 9 in adult 08/06/2019    Medicare annual wellness visit, subsequent 03/20/2019    Hypercholesterolemia 07/12/2018    Borderline hypertension 07/12/2018         Review of Systems:    General: negative for - chills, fatigue, fever,  weight gain or weight loss  Psychological: negative for - anxiety, behavioral disorder, concentration difficulties  Ophthalmic: negative for - blurry vision, decreased vision, double vision,      Past Medical History:   Past Medical History:   Diagnosis Date    Encounter for general adult medical examination without abnormal findings 3/20/2019    Hyperlipidemia        Past Surgical History:   Past Surgical History:   Procedure Laterality Date    HERNIA REPAIR         Family History:  Family history reviewed and non-contributory  No family history on file  Social History:  Social History     Socioeconomic History    Marital status: /Civil Union     Spouse name: None    Number of children: None    Years of education: None    Highest education level: None   Occupational History    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[employer]   Tobacco Use    Smoking status: Former Smoker    Smokeless tobacco: Never Used    Tobacco comment: Smoked as a teenager   Vaping Use    Vaping Use: Never used   Substance and Sexual Activity    Alcohol use: Yes     Comment: Rare    Drug use: No    Sexual activity: None   Other Topics Concern    None   Social History Narrative    None     Social Determinants of Health     Financial Resource Strain:     Difficulty of Paying Living Expenses:    Food Insecurity:     Worried About Running Out of Food in the Last Year:     Ran Out of Food in the Last Year:    Transportation Needs:     Lack of Transportation (Medical):      Lack of Transportation (Non-Medical):    Physical Activity:     Days of Exercise per Week:     Minutes of Exercise per Session:    Stress:     Feeling of Stress :    Social Connections:     Frequency of Communication with Friends and Family:     Frequency of Social Gatherings with Friends and Family:     Attends [employer] Services:     Active Member of Clubs or Organizations:     Attends Club or Organization Meetings:     Marital Status:    Intimate Partner Violence:     Fear of Current or Ex-Partner:     Emotionally Abused:     Physically Abused:     Sexually Abused: Allergies:   No Known Allergies        Physical ExaminationBP 132/68   Pulse 74   Ht 5' 11" (1 803 m)   Wt 84 8 kg (187 lb)   BMI 26 08 kg/m²   Gen: Alert and oriented to person, place, time  HEENT: EOMI, eyes clear, moist mucus membranes, hearing intact  [employer], RS, abdominal system normal to examine    Orthopedic Exam  Right hand no obvious wasting deformities of multiple fingers 2nd root D IP joint arthritis  Tinel sign Phalen sign positive of the right wrist EMG study shows severe carpal tunnel compression right wrist          Impression  Severe symptomatic right carpal tunnel syndrome          Plan    Discussed treatment options including nonoperative and operative, the wife and the patient feels that like to proceed with surgery his symptoms are not allowing him to sleep and I think this is reasonable given the degree of compression of the EMG    Carpal tunnel syndrome    The anatomy and physiology of carpal tunnel syndrome was discussed with the patient today  Increased pressure localized under the transverse carpal ligament can cause pain and numbness tingling or dysesthesias, within the median nerve distribution as well as feelings of fatigue clumsiness or awkwardness  These symptoms typically occur  at  night and worse in the morning upon waking  Eventually untreated carpal tunnel syndrome can result in weakness or permanent loss of function within the thenar compartment of the hand  Treatment options were discussed with the patient    Conservative treatment includes nocturnal resting splints, to keep the nerve in a neutral position, ergonomic changes within the work or home environment, activity modification and tendon gliding exercises  Steroid injections within the carpal canal can help a  majority of patients, However yhis Is often self-limiting in a majority of patients  Surgical Intervention to divide the Transverse Carpal Ligament typically results in a  Long-Lasting Relief of the patient's complaints with a recurrence rate of Less Than 1%  Patient Has elected to undergo surgical Release of the Transverse Carpal Ligament  Patient Has elected to undergo OPEN Carpal Tunnel Release  In the postoperative period, Light Activities are  allowed Immediately, Driving Is allowed when Narcotic medication has stopped, and the Incision May Get Wet after 5 Days  Heavy Activities like lifting more Than 10 Pounds will be allowed after  approximately 21 Days  When the pain and discomfort in the hands generally  improves rapidly, the numbness and tingling as well as the strength will slowly improve over Weeks to Months Depending on the Severity of the Carpal Tunnel Syndrome  Pillar Pain Was Discussed with the Patient Which Is Typically a Common Self-Limiting Conditions  The Risks of Bleeding and Infection from the Surgery Are Less Than 1%  Risk of Recurrence Is Approximately 0 5%  The Risk of Nerve Injury and Nerve Damage or Damage to the Blood Vessels Is Approximately One in 1200  The Patient Has an Understanding of the above Mentioned Discussion  The Risks and Benefits of the Procedure Were Explained to the Patient Which Include, but Are Not Limited to, Bleeding, Infection, Recurrence, Pain, Scar, Damage to Tendons, Damage to Nerves and Damage to Blood Vessels and Complications Related to Anesthesia  These Risks along with Alternative Conservative Treatment Options and Postoperative Protocols Were voiced Back and Understood by the Patient  All Questions Were Answered to the Patient's Satisfaction  The Patient Agrees to Comply with the Standard Postoperative Protocol, and Is Willing to Proceed    Education Was Provided Via written and auditory  Forms  There were  No Barriers to Learning  Written Handouts regarding Wound Care Incision and Scar Care and General Preoperative Information Was Provided to the Patient  Prior to Surgery, the Patient Is Requested to Stop All Anti-Inflammatory Medications  Prophylactic Aspirin Plavix and Coumadin Allowed to Be Continued up to 5 Days before the Procedure  Hypertensive Medications and Beta Blockers If They Can Should Be Continued  Ulises Santos MD        Portions of the record may have been created with voice recognition software  Occasional wrong word or "sound a like" substitutions may have occurred due to the inherent limitations of voice recognition software  Read the chart carefully and recognize, using context, where substitutions have occurred

## 2021-09-15 NOTE — H&P (VIEW-ONLY)
Chief Complaint  Right hand pins and needles numbness    History Of Presenting Illness  Maria Antonia Brooks 1940 presents with right hand pins and needles numbness for the last 3 months  Mainly affects the radial 3 fingers  Patient has difficulty sleeping at night  Patient has longstanding deformity of his fingers secondary to arthritis  Patient comes in for evaluation with the EMG studies  Patient is hard of hearing and his wife does most of the interpretation for him  Current Medications  Current Outpatient Medications   Medication Sig Dispense Refill    ascorbic acid (VITAMIN C) 500 mg tablet Take 500 mg by mouth daily       atorvastatin (Lipitor) 10 mg tablet Take by mouth       cholecalciferol (VITAMIN D3) 400 units tablet Take 400 Units by mouth daily      co-enzyme Q-10 30 MG capsule Take 30 mg by mouth 3 (three) times a day      hydrochlorothiazide (HYDRODIURIL) 25 mg tablet Take 1 tablet (25 mg total) by mouth daily 30 tablet 3    meloxicam (MOBIC) 15 mg tablet Take 1 tablet (15 mg total) by mouth daily 30 tablet 5    Multiple Vitamin (MULTIVITAMIN) tablet Take by mouth daily       Multiple Vitamins-Minerals (PRESERVISION AREDS 2 PO) Take by mouth      simvastatin (ZOCOR) 10 mg tablet Take 10 mg by mouth daily at bedtime      simvastatin (ZOCOR) 20 mg tablet Take 20 mg by mouth daily at bedtime      traMADol (ULTRAM) 50 mg tablet Take 1 tablet (50 mg total) by mouth 2 (two) times a day as needed for severe pain 15 tablet 0     No current facility-administered medications for this visit         Current Problems    Active Problems:   Patient Active Problem List    Diagnosis Date Noted    Right hand pain     Carpal tunnel syndrome on right     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[facility] [address] ) 09/02/2021    Overweight (BMI 25 0-29 9) 01/27/2021    Negative depression screening 09/26/2019    Overweight with body mass index (BMI) of 26 to 26 9 in adult 08/06/2019    Medicare annual wellness visit, subsequent 03/20/2019    Hypercholesterolemia 07/12/2018    Borderline hypertension 07/12/2018         Review of Systems:    General: negative for - chills, fatigue, fever,  weight gain or weight loss  Psychological: negative for - anxiety, behavioral disorder, concentration difficulties  Ophthalmic: negative for - blurry vision, decreased vision, double vision,      Past Medical History:   Past Medical History:   Diagnosis Date    Encounter for general adult medical examination without abnormal findings 3/20/2019    Hyperlipidemia        Past Surgical History:   Past Surgical History:   Procedure Laterality Date    HERNIA REPAIR         Family History:  Family history reviewed and non-contributory  No family history on file  Social History:  Social History     Socioeconomic History    Marital status: /Civil Union     Spouse name: None    Number of children: None    Years of education: None    Highest education level: None   Occupational History    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[employer]   Tobacco Use    Smoking status: Former Smoker    Smokeless tobacco: Never Used    Tobacco comment: Smoked as a teenager   Vaping Use    Vaping Use: Never used   Substance and Sexual Activity    Alcohol use: Yes     Comment: Rare    Drug use: No    Sexual activity: None   Other Topics Concern    None   Social History Narrative    None     Social Determinants of Health     Financial Resource Strain:     Difficulty of Paying Living Expenses:    Food Insecurity:     Worried About Running Out of Food in the Last Year:     Ran Out of Food in the Last Year:    Transportation Needs:     Lack of Transportation (Medical):      Lack of Transportation (Non-Medical):    Physical Activity:     Days of Exercise per Week:     Minutes of Exercise per Session:    Stress:     Feeling of Stress :    Social Connections:     Frequency of Communication with Friends and Family:     Frequency of Social Gatherings with Friends and Family:     Attends [employer] Services:     Active Member of Clubs or Organizations:     Attends Club or Organization Meetings:     Marital Status:    Intimate Partner Violence:     Fear of Current or Ex-Partner:     Emotionally Abused:     Physically Abused:     Sexually Abused: Allergies:   No Known Allergies        Physical ExaminationBP 132/68   Pulse 74   Ht 5' 11" (1 803 m)   Wt 84 8 kg (187 lb)   BMI 26 08 kg/m²   Gen: Alert and oriented to person, place, time  HEENT: EOMI, eyes clear, moist mucus membranes, hearing intact  CVS, RS, abdominal system normal to examine    Orthopedic Exam  Right hand no obvious wasting deformities of multiple fingers 2nd root D IP joint arthritis  Tinel sign Phalen sign positive of the right wrist EMG study shows severe carpal tunnel compression right wrist          Impression  Severe symptomatic right carpal tunnel syndrome          Plan    Discussed treatment options including nonoperative and operative, the wife and the patient feels that like to proceed with surgery his symptoms are not allowing him to sleep and I think this is reasonable given the degree of compression of the EMG    Carpal tunnel syndrome    The anatomy and physiology of carpal tunnel syndrome was discussed with the patient today  Increased pressure localized under the transverse carpal ligament can cause pain and numbness tingling or dysesthesias, within the median nerve distribution as well as feelings of fatigue clumsiness or awkwardness  These symptoms typically occur  at  night and worse in the morning upon waking  Eventually untreated carpal tunnel syndrome can result in weakness or permanent loss of function within the thenar compartment of the hand  Treatment options were discussed with the patient    Conservative treatment includes nocturnal resting splints, to keep the nerve in a neutral position, ergonomic changes within the work or home environment, activity modification and tendon gliding exercises  Steroid injections within the carpal canal can help a  majority of patients, However yhis Is often self-limiting in a majority of patients  Surgical Intervention to divide the Transverse Carpal Ligament typically results in a  Long-Lasting Relief of the patient's complaints with a recurrence rate of Less Than 1%  Patient Has elected to undergo surgical Release of the Transverse Carpal Ligament  Patient Has elected to undergo OPEN Carpal Tunnel Release  In the postoperative period, Light Activities are  allowed Immediately, Driving Is allowed when Narcotic medication has stopped, and the Incision May Get Wet after 5 Days  Heavy Activities like lifting more Than 10 Pounds will be allowed after  approximately 21 Days  When the pain and discomfort in the hands generally  improves rapidly, the numbness and tingling as well as the strength will slowly improve over Weeks to Months Depending on the Severity of the Carpal Tunnel Syndrome  Pillar Pain Was Discussed with the Patient Which Is Typically a Common Self-Limiting Conditions  The Risks of Bleeding and Infection from the Surgery Are Less Than 1%  Risk of Recurrence Is Approximately 0 5%  The Risk of Nerve Injury and Nerve Damage or Damage to the Blood Vessels Is Approximately One in 1200  The Patient Has an Understanding of the above Mentioned Discussion  The Risks and Benefits of the Procedure Were Explained to the Patient Which Include, but Are Not Limited to, Bleeding, Infection, Recurrence, Pain, Scar, Damage to Tendons, Damage to Nerves and Damage to Blood Vessels and Complications Related to Anesthesia  These Risks along with Alternative Conservative Treatment Options and Postoperative Protocols Were voiced Back and Understood by the Patient  All Questions Were Answered to the Patient's Satisfaction  The Patient Agrees to Comply with the Standard Postoperative Protocol, and Is Willing to Proceed    Education Was Provided Via written and auditory  Forms  There were  No Barriers to Learning  Written Handouts regarding Wound Care Incision and Scar Care and General Preoperative Information Was Provided to the Patient  Prior to Surgery, the Patient Is Requested to Stop All Anti-Inflammatory Medications  Prophylactic Aspirin Plavix and Coumadin Allowed to Be Continued up to 5 Days before the Procedure  Hypertensive Medications and Beta Blockers If They Can Should Be Continued  Tatianna Rubin MD        Portions of the record may have been created with voice recognition software  Occasional wrong word or "sound a like" substitutions may have occurred due to the inherent limitations of voice recognition software  Read the chart carefully and recognize, using context, where substitutions have occurred

## 2021-09-15 NOTE — PATIENT INSTRUCTIONS
Pre-Surgery Instructions:   Medication Instructions    ascorbic acid (VITAMIN C) 500 mg tablet per anesthesia guidelines     atorvastatin (Lipitor) 10 mg tablet per anesthesia guidelines     cholecalciferol (VITAMIN D3) 400 units tablet per anesthesia guidelines     co-enzyme Q-10 30 MG capsule per anesthesia guidelines     hydrochlorothiazide (HYDRODIURIL) 25 mg tablet Take morning of surgery    meloxicam (MOBIC) 15 mg tablet per anesthesia guidelines     Multiple Vitamin (MULTIVITAMIN) tablet per anesthesia guidelines     Multiple Vitamins-Minerals (PRESERVISION AREDS 2 PO) per anesthesia guidelines     simvastatin (ZOCOR) 10 mg tablet per anesthesia guidelines     simvastatin (ZOCOR) 20 mg tablet per anesthesia guidelines     traMADol (ULTRAM) 50 mg tablet per anesthesia guidelines

## 2021-09-16 ENCOUNTER — APPOINTMENT (OUTPATIENT)
Dept: LAB | Facility: HOSPITAL | Age: 81
End: 2021-09-16
Attending: ORTHOPAEDIC SURGERY
Payer: MEDICARE

## 2021-09-16 DIAGNOSIS — Z01.812 PRE-OPERATIVE LABORATORY EXAMINATION: ICD-10-CM

## 2021-09-16 DIAGNOSIS — M79.641 RIGHT HAND PAIN: ICD-10-CM

## 2021-09-16 DIAGNOSIS — G56.01 RIGHT CARPAL TUNNEL SYNDROME: ICD-10-CM

## 2021-09-16 LAB
APTT PPP: 33 SECONDS (ref 23–37)
BASOPHILS # BLD AUTO: 0 THOUSANDS/ΜL (ref 0–0.1)
BASOPHILS NFR BLD AUTO: 1 % (ref 0–2)
EOSINOPHIL # BLD AUTO: 0.3 THOUSAND/ΜL (ref 0–0.61)
EOSINOPHIL NFR BLD AUTO: 4 % (ref 0–5)
ERYTHROCYTE [DISTWIDTH] IN BLOOD BY AUTOMATED COUNT: 13.4 % (ref 11.5–14.5)
HCT VFR BLD AUTO: 41.4 % (ref 42–47)
HGB BLD-MCNC: 13.9 G/DL (ref 14–18)
INR PPP: 1 (ref 0.84–1.19)
LYMPHOCYTES # BLD AUTO: 1.4 THOUSANDS/ΜL (ref 0.6–4.47)
LYMPHOCYTES NFR BLD AUTO: 20 % (ref 21–51)
MCH RBC QN AUTO: 32.2 PG (ref 26–34)
MCHC RBC AUTO-ENTMCNC: 33.6 G/DL (ref 31–37)
MCV RBC AUTO: 96 FL (ref 81–99)
MONOCYTES # BLD AUTO: 0.7 THOUSAND/ΜL (ref 0.17–1.22)
MONOCYTES NFR BLD AUTO: 11 % (ref 2–12)
NEUTROPHILS # BLD AUTO: 4.3 THOUSANDS/ΜL (ref 1.4–6.5)
NEUTS SEG NFR BLD AUTO: 64 % (ref 42–75)
PLATELET # BLD AUTO: 404 THOUSANDS/UL (ref 149–390)
PMV BLD AUTO: 7.7 FL (ref 8.6–11.7)
PROTHROMBIN TIME: 13.3 SECONDS (ref 11.6–14.5)
RBC # BLD AUTO: 4.32 MILLION/UL (ref 4.3–5.9)
WBC # BLD AUTO: 6.8 THOUSAND/UL (ref 4.8–10.8)

## 2021-09-16 PROCEDURE — 36415 COLL VENOUS BLD VENIPUNCTURE: CPT

## 2021-09-16 PROCEDURE — 85730 THROMBOPLASTIN TIME PARTIAL: CPT

## 2021-09-16 PROCEDURE — 93005 ELECTROCARDIOGRAM TRACING: CPT

## 2021-09-16 PROCEDURE — 85025 COMPLETE CBC W/AUTO DIFF WBC: CPT

## 2021-09-16 PROCEDURE — 85610 PROTHROMBIN TIME: CPT

## 2021-09-17 LAB
ATRIAL RATE: 67 BPM
P AXIS: 35 DEGREES
PR INTERVAL: 174 MS
QRS AXIS: 50 DEGREES
QRSD INTERVAL: 140 MS
QT INTERVAL: 444 MS
QTC INTERVAL: 469 MS
T WAVE AXIS: 38 DEGREES
VENTRICULAR RATE: 67 BPM

## 2021-09-17 PROCEDURE — 93010 ELECTROCARDIOGRAM REPORT: CPT | Performed by: INTERNAL MEDICINE

## 2021-09-17 NOTE — PRE-PROCEDURE INSTRUCTIONS
Pre-Surgery Instructions:   Medication Instructions    ascorbic acid (VITAMIN C) 500 mg tablet Instructed to avoid all ASA and OTC Vit/Supp 1 week prior to surgery and to avoid NSAIDs 3 days prior to surgery per anesthesia instructions  Tylenol ok to take prn   cholecalciferol (VITAMIN D3) 400 units tablet Instructed to avoid all ASA and OTC Vit/Supp 1 week prior to surgery and to avoid NSAIDs 3 days prior to surgery per anesthesia instructions  Tylenol ok to take prn   co-enzyme Q-10 30 MG capsule Instructed to avoid all ASA and OTC Vit/Supp 1 week prior to surgery and to avoid NSAIDs 3 days prior to surgery per anesthesia instructions  Tylenol ok to take prn   hydrochlorothiazide (HYDRODIURIL) 25 mg tablet Instructed to take per normal schedule except DOS    Multiple Vitamin (MULTIVITAMIN) tablet Instructed to avoid all ASA and OTC Vit/Supp 1 week prior to surgery and to avoid NSAIDs 3 days prior to surgery per anesthesia instructions  Tylenol ok to take prn   Multiple Vitamins-Minerals (PRESERVISION AREDS 2 PO) Instructed to avoid all ASA and OTC Vit/Supp 1 week prior to surgery and to avoid NSAIDs 3 days prior to surgery per anesthesia instructions  Tylenol ok to take prn   simvastatin (ZOCOR) 20 mg tablet takes at HS    traMADol (ULTRAM) 50 mg tablet Instructed to take as needed including DOS with sips water     Have you had / have a sore throat? no  Have you had / have a cough less than 1 week? no  Have you had / have a fever greater than 100 0 - 100  4? no  Are you experiencing any shortness of breath? No  Fully vaccinated 2/17/2021, 1/19/2021    Reviewed with patient, in detail, instructions from "My Surgical Experience"  Instructed to avoid all ASA and OTC Vit/Supp 1 week prior to surgery and to avoid NSAIDs 7 days prior to surgery per anesthesia instructions  Tylenol ok to take prn   Advised patient that Michael Montes will call with surgery arrival time and hospital directions the business day prior to surgery  Advised patient nothing eat or drink after midnight  Patient verbalized understanding and knows to call surgeon's office with any additional questions prior to surgery  Pt  Verbalized understanding of current visitor restrictions

## 2021-09-19 ENCOUNTER — ANESTHESIA EVENT (OUTPATIENT)
Dept: PERIOP | Facility: HOSPITAL | Age: 81
End: 2021-09-19
Payer: MEDICARE

## 2021-09-19 NOTE — ANESTHESIA PREPROCEDURE EVALUATION
Procedure:  RELEASE CARPAL TUNNEL (Right Wrist)    Relevant Problems   CARDIO   (+) Hypercholesterolemia      NEURO/PSYCH   (+) 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HCC)             Anesthesia Plan  ASA Score- 2     Anesthesia Type- IV sedation with anesthesia with ASA Monitors  Additional Monitors:   Airway Plan:     Comment: IV sedation, GA back up; standard ASA monitors  Risks and benefits discussed with patient; patient consented and agrees to proceed  I saw and evaluated the patient  If seen with CRNA, we have discussed the anesthetic plan and I am in agreement that the plan is appropriate for the patient          Plan Factors-    Chart reviewed  Induction- intravenous  Postoperative Plan- Plan for postoperative opioid use  Informed Consent- Anesthetic plan and risks discussed with patient  I personally reviewed this patient with the CRNA  Discussed and agreed on the Anesthesia Plan with the CRNA  Malia Rahman

## 2021-09-20 ENCOUNTER — ANESTHESIA (OUTPATIENT)
Dept: PERIOP | Facility: HOSPITAL | Age: 81
End: 2021-09-20
Payer: MEDICARE

## 2021-09-20 ENCOUNTER — HOSPITAL ENCOUNTER (OUTPATIENT)
Facility: HOSPITAL | Age: 81
Setting detail: OUTPATIENT SURGERY
Discharge: HOME | End: 2021-09-20
Attending: ORTHOPAEDIC SURGERY | Admitting: ORTHOPAEDIC SURGERY
Payer: MEDICARE

## 2021-09-20 VITALS
BODY MASS INDEX: 26.18 KG/M2 | HEART RATE: 62 BPM | OXYGEN SATURATION: 95 % | SYSTOLIC BLOOD PRESSURE: 117 MMHG | WEIGHT: 187 LBS | DIASTOLIC BLOOD PRESSURE: 63 MMHG | RESPIRATION RATE: 16 BRPM | HEIGHT: 71 IN | TEMPERATURE: 98 F

## 2021-09-20 DIAGNOSIS — Z98.890 STATUS POST CARPAL TUNNEL RELEASE: Primary | ICD-10-CM

## 2021-09-20 DIAGNOSIS — G56.01 CARPAL TUNNEL SYNDROME ON RIGHT: ICD-10-CM

## 2021-09-20 PROCEDURE — 64721 CARPAL TUNNEL SURGERY: CPT | Performed by: ORTHOPAEDIC SURGERY

## 2021-09-20 PROCEDURE — 64721 CARPAL TUNNEL SURGERY: CPT | Performed by: PHYSICIAN ASSISTANT

## 2021-09-20 RX ORDER — FENTANYL CITRATE 50 UG/ML
INJECTION, SOLUTION INTRAMUSCULAR; INTRAVENOUS AS NEEDED
Status: DISCONTINUED | OUTPATIENT
Start: 2021-09-20 | End: 2021-09-20

## 2021-09-20 RX ORDER — FENTANYL CITRATE/PF 50 MCG/ML
25 SYRINGE (ML) INJECTION
Status: CANCELLED | OUTPATIENT
Start: 2021-09-20

## 2021-09-20 RX ORDER — CHLORHEXIDINE GLUCONATE 4 G/100ML
SOLUTION TOPICAL DAILY PRN
Status: DISCONTINUED | OUTPATIENT
Start: 2021-09-20 | End: 2021-09-20 | Stop reason: HOSPADM

## 2021-09-20 RX ORDER — FENTANYL CITRATE/PF 50 MCG/ML
25 SYRINGE (ML) INJECTION
Status: DISCONTINUED | OUTPATIENT
Start: 2021-09-20 | End: 2021-09-20 | Stop reason: HOSPADM

## 2021-09-20 RX ORDER — ONDANSETRON 2 MG/ML
4 INJECTION INTRAMUSCULAR; INTRAVENOUS ONCE AS NEEDED
Status: DISCONTINUED | OUTPATIENT
Start: 2021-09-20 | End: 2021-09-20 | Stop reason: HOSPADM

## 2021-09-20 RX ORDER — KETOROLAC TROMETHAMINE 30 MG/ML
INJECTION, SOLUTION INTRAMUSCULAR; INTRAVENOUS AS NEEDED
Status: DISCONTINUED | OUTPATIENT
Start: 2021-09-20 | End: 2021-09-20

## 2021-09-20 RX ORDER — DEXAMETHASONE SODIUM PHOSPHATE 4 MG/ML
8 INJECTION, SOLUTION INTRA-ARTICULAR; INTRALESIONAL; INTRAMUSCULAR; INTRAVENOUS; SOFT TISSUE ONCE AS NEEDED
Status: DISCONTINUED | OUTPATIENT
Start: 2021-09-20 | End: 2021-09-20 | Stop reason: HOSPADM

## 2021-09-20 RX ORDER — CEFAZOLIN SODIUM 2 G/50ML
2000 SOLUTION INTRAVENOUS ONCE
Status: COMPLETED | OUTPATIENT
Start: 2021-09-20 | End: 2021-09-20

## 2021-09-20 RX ORDER — TRAMADOL HYDROCHLORIDE 50 MG/1
TABLET ORAL
Qty: 20 TABLET | Refills: 0 | Status: SHIPPED | OUTPATIENT
Start: 2021-09-20 | End: 2021-11-05 | Stop reason: ALTCHOICE

## 2021-09-20 RX ORDER — DEXAMETHASONE SODIUM PHOSPHATE 4 MG/ML
INJECTION, SOLUTION INTRA-ARTICULAR; INTRALESIONAL; INTRAMUSCULAR; INTRAVENOUS; SOFT TISSUE AS NEEDED
Status: DISCONTINUED | OUTPATIENT
Start: 2021-09-20 | End: 2021-09-20

## 2021-09-20 RX ORDER — ONDANSETRON 2 MG/ML
4 INJECTION INTRAMUSCULAR; INTRAVENOUS ONCE AS NEEDED
Status: CANCELLED | OUTPATIENT
Start: 2021-09-20

## 2021-09-20 RX ORDER — PROPOFOL 10 MG/ML
INJECTION, EMULSION INTRAVENOUS AS NEEDED
Status: DISCONTINUED | OUTPATIENT
Start: 2021-09-20 | End: 2021-09-20

## 2021-09-20 RX ORDER — PROPOFOL 10 MG/ML
INJECTION, EMULSION INTRAVENOUS CONTINUOUS PRN
Status: DISCONTINUED | OUTPATIENT
Start: 2021-09-20 | End: 2021-09-20

## 2021-09-20 RX ORDER — DEXAMETHASONE SODIUM PHOSPHATE 4 MG/ML
8 INJECTION, SOLUTION INTRA-ARTICULAR; INTRALESIONAL; INTRAMUSCULAR; INTRAVENOUS; SOFT TISSUE ONCE AS NEEDED
Status: CANCELLED | OUTPATIENT
Start: 2021-09-20

## 2021-09-20 RX ORDER — SODIUM CHLORIDE, SODIUM LACTATE, POTASSIUM CHLORIDE, CALCIUM CHLORIDE 600; 310; 30; 20 MG/100ML; MG/100ML; MG/100ML; MG/100ML
125 INJECTION, SOLUTION INTRAVENOUS CONTINUOUS
Status: DISCONTINUED | OUTPATIENT
Start: 2021-09-20 | End: 2021-09-20 | Stop reason: HOSPADM

## 2021-09-20 RX ORDER — OXYCODONE HYDROCHLORIDE AND ACETAMINOPHEN 5; 325 MG/1; MG/1
2 TABLET ORAL EVERY 4 HOURS PRN
Status: DISCONTINUED | OUTPATIENT
Start: 2021-09-20 | End: 2021-09-20 | Stop reason: HOSPADM

## 2021-09-20 RX ADMIN — SODIUM CHLORIDE, SODIUM LACTATE, POTASSIUM CHLORIDE, AND CALCIUM CHLORIDE: .6; .31; .03; .02 INJECTION, SOLUTION INTRAVENOUS at 07:31

## 2021-09-20 RX ADMIN — FENTANYL CITRATE 25 MCG: 50 INJECTION, SOLUTION INTRAMUSCULAR; INTRAVENOUS at 07:33

## 2021-09-20 RX ADMIN — DEXAMETHASONE SODIUM PHOSPHATE 4 MG: 4 INJECTION, SOLUTION INTRAMUSCULAR; INTRAVENOUS at 07:38

## 2021-09-20 RX ADMIN — KETOROLAC TROMETHAMINE 15 MG: 30 INJECTION, SOLUTION INTRAMUSCULAR at 07:54

## 2021-09-20 RX ADMIN — PROPOFOL 50 MG: 10 INJECTION, EMULSION INTRAVENOUS at 07:33

## 2021-09-20 RX ADMIN — CEFAZOLIN SODIUM 2000 MG: 2 SOLUTION INTRAVENOUS at 07:31

## 2021-09-20 RX ADMIN — SODIUM CHLORIDE, SODIUM LACTATE, POTASSIUM CHLORIDE, AND CALCIUM CHLORIDE 125 ML/HR: .6; .31; .03; .02 INJECTION, SOLUTION INTRAVENOUS at 06:42

## 2021-09-20 RX ADMIN — PROPOFOL 100 MCG/KG/MIN: 10 INJECTION, EMULSION INTRAVENOUS at 07:35

## 2021-09-20 NOTE — INTERVAL H&P NOTE
H&P reviewed  After examining the patient I find no changes in the patients condition since the H&P had been written      Vitals:    09/20/21 0632   BP: 145/76   Pulse: 75   Resp: 18   Temp: 98 4 °F (36 9 °C)   SpO2: 93%

## 2021-09-20 NOTE — ANESTHESIA POSTPROCEDURE EVALUATION
Post-Op Assessment Note    CV Status:  Stable  Pain Score: 0    Pain management: adequate     Mental Status:  Alert and awake   Hydration Status:  Euvolemic   PONV Controlled:  Controlled   Airway Patency:  Patent   Two or more mitigation strategies used for obstructive sleep apnea   Post Op Vitals Reviewed: Yes      Staff: CRNA         No complications documented      BP   116/61   Temp   97 6   Pulse  78   Resp   16   SpO2   94

## 2021-09-20 NOTE — DISCHARGE INSTRUCTIONS
Carpal Tunnel Syndrome   AMBULATORY CARE:   Carpal tunnel syndrome (CTS)  is a condition that causes pressure to build in the carpal tunnel  The carpal tunnel is a small area between bones and tissues in your wrist  Swelling in this area puts pressure on the median nerve  The median nerve controls muscles and feeling in the hand  Common signs and symptoms:   · Dull, sharp, or shooting pain in your hand  · Numbness, tingling, or a burning feeling in your thumb, first finger, and middle finger  · Arm pain that may extend to your shoulder  · Weakness in your hand  · Swelling in your hand  · Not being able to control how your hand moves, or you drop objects  Seek care immediately if:   · You suddenly lose feeling in your hand or fingers and you cannot move them  · Your hand suddenly changes color  Contact your healthcare provider if:   · Your symptoms get worse  · Your hand and fingers are so weak that you cannot grab, squeeze, or lift items  · You have questions or concerns about your condition or care  Treatment  may not be needed  If your symptoms continue or are severe, you may need any of the following:  · NSAIDs  may be recommended to decrease swelling and pain  NSAIDs are available without a doctor's order  Ask your healthcare provider which medicine is right for you and how much to take  Take as directed  NSAIDs can cause stomach bleeding or kidney problems if not taken correctly  If you take blood thinning medicine, always ask your healthcare provider if NSAIDs are safe for you  · Surgery  called decompression may be used to take pressure off of the median nerve in your wrist   Manage your symptoms:   · Apply ice to your wrist   Ice helps decrease swelling and pain in your wrist  Ice may also help prevent tissue damage  Use an ice pack, or put crushed ice in a plastic bag  Cover the ice pack with a towel  Place it on your wrist for 15 to 20 minutes every hour, or as directed  · Rest your hands  Let your hands rest for a short time between repetitive motions, such as typing  If you feel pain, stop what you are doing and gently massage your wrist and hand  · Get physical and occupational therapy, if directed  Physical therapists will show you ways to exercise and strengthen your wrist  Occupational therapists will show you safe ways to use your wrist while you do your usual activities  · Use a wrist splint as directed  A splint will keep your wrist straight or in a slightly bent position  A wrist splint decreases pressure on the median nerve by letting your wrist rest  You may need to wear the splint for up to 8 weeks  You may need to wear it at night  Follow up with your healthcare provider as directed:  Write down your questions so you remember to ask them during your visits     POST OP Instructions     You have had had surgery to release the retinaculum over the carpal tunnel    ELEVATE LIMB  ICE SURGICAL SITE EVERY 4 HOURS TO AFFECTED SITE  KEEP DRESSINGS CLEAN AND INTACT  CALL DR RODGERS AT [facility] [phone] TO SCHEDULE POSTOP APPOINTMENT  CALL DR RODGERS AT  [facility] [phone] FOR ANY CONCERNS OR QUESTIONS OR PROBLEMS     You may remove the bulky dressings after 72 hours  Please apply waterproof Band-Aid  Do not get incision wet for the first  3 days  May have shower with waterproof Band-Aid after 3 days  Please exercise fingers elbow shoulder and hand regularly  Do not lift anything more than 5 lb or injure the operative site for the first  2 weeks  Please call the office with any concerns

## 2021-09-20 NOTE — OP NOTE
OPERATIVE REPORT  PATIENT NAME: Karo Ignacio    :  1940  MRN: [id number]  Pt Location: [facility] OR ROOM 01    SURGERY DATE: 2021    Surgeon(s) and Role:     * Lacie Wiggins MD - Primary     * Shandra La PA-C - Assisting no qualified resident available, physician assistant helped medically necessary to perform minimally invasive right carpal tunnel release to protect the median nerve and tendon for performing the procedure    Preop Diagnosis:  Right carpal tunnel syndrome [G56 01]    Post-Op Diagnosis Codes:     * Right carpal tunnel syndrome [G56 01]    Procedure(s) (LRB):  RELEASE CARPAL TUNNEL (Right)    Specimen(s):  * No specimens in log *    Estimated Blood Loss:   Minimal    Drains:  * No LDAs found *    Anesthesia Type:   IV Sedation with Anesthesia    Operative Indications:  Right carpal tunnel syndrome [G56 01]  Pins and needles numbness right hand not relieved by splinting    Operative Findings:  Extremely thickened  Flexor retinaculum flattened median nerve with loss of normal sheen  Moderate tenosynovitis in the flexor tendon sheaths with fluid complete decompression obtained proximally and distally under direct visualization    Complications:   None    Procedure and Technique: All treatment options were discussed with the patient including non-operative and operative treatment options  Patient has failed non-perative treatment and has opted for surgical intervention  Risks, complications and benefits of all treatment options were discussed in detail  The risks of surgical intervention including infection, injury to vessels and nerves  risk of failure to achieve desired results, risk of need for further procedures, potential risk of loss of life and limb were discussed with the patient  Informed consent was obtained from the patient  The operative site was marked and signed  A timeout was performed prior to the procedure  The patient was re-identified ,including name, date of birth, procedure, consent form reviewed, site and laterality  Appropriate antibiotics were administered preoperatively    The Physician Assistant was present for the entire case and provided essential assistance with patient positioning, prep and draping, wound closure, sterile dressing and splint application, all under my direct supervision(there was no resident available to assist with this case)    Patient positioned supine on the operating table with all bony problems well-padded right upper extremity prepared and draped in sterile fashion  Local anesthetic was used to induce a local anesthesia with sedation  A standard incision was used starting in line with Michelle's line in line with the 4th ray going proximally for 2 cm  Thick flaps were raised  Flexor retinaculum was identified it was extremely thickened  Hemostasis obtained with the Bovie  A rent was created in the flexor retinaculum  A once this was done the underlying structures were protected while a complete proximal and distal release was obtained  The nerve was flattened with loss of normal sheen  There is mild to moderate flexor tenosynovitis the flexor tendon sheath    A complete proximal and distal release was obtained under direct visualization   I was present for the entire procedure    Patient Disposition:  PACU     SIGNATURE: Juan A Rios MD  DATE: September 20, 2021  TIME: 7:57 AM

## 2021-09-23 ENCOUNTER — EVALUATION (OUTPATIENT)
Dept: OCCUPATIONAL THERAPY | Facility: CLINIC | Age: 81
End: 2021-09-23
Payer: MEDICARE

## 2021-09-23 DIAGNOSIS — Z98.890 STATUS POST CARPAL TUNNEL RELEASE: Primary | ICD-10-CM

## 2021-09-23 DIAGNOSIS — G56.01 CARPAL TUNNEL SYNDROME ON RIGHT: ICD-10-CM

## 2021-09-23 PROCEDURE — 97535 SELF CARE MNGMENT TRAINING: CPT

## 2021-09-23 PROCEDURE — 97166 OT EVAL MOD COMPLEX 45 MIN: CPT

## 2021-09-23 NOTE — PROGRESS NOTES
OT Evaluation     Today's date: 2021  Patient name: Swapna Prince  : 1940  MRN: [id number]  Referring provider: Usman Davila MD  Dx:   Encounter Diagnosis     ICD-10-CM    1  Status post carpal tunnel release  Z98 [id number] Ambulatory referral to Occupational Therapy   2  Carpal tunnel syndrome on right  G56 [id number] Ambulatory referral to Occupational Therapy                  Assessment  Assessment details: Patient presenting with OP OT services with a diagnosis of s/p R CTR  Patient had surgery completed on 2021 by Dr Raymond Halsted  Patient is hard of hearing and patient wife (Nyla Romero) was present during evaluation and provided information regarding PMH  Patient reports onset of symptoms to be approximately 6 months ago  Patient is a retired farmer  Patient reports no pain or aggravation for the L wirst  Patient reports no current pain for R wrist  Patient's wife was educated on precautions and how to properly cover incision site for when client leaves the home  Therapist reviewed HEP with patient's wife with a verbalized understanding  Patient's next follow-up appointment is on 2021 to remove stitches  Patient is right handed  Patient has symptoms in left hand as per wife but they are not as severe as right  Patient presenting with post-operative dressings without ACE wrap  Impairments: abnormal coordination, abnormal or restricted ROM and impaired physical strength    Symptom irritability: moderateUnderstanding of Dx/Px/POC: good   Prognosis: good    Goals  STGs    Pt will increase  strength by 5-10#  Pt will increase wrist strength by 1/2 grade  Pt will increase wrist ROM by 50%     Pt will demonstrate decrease in edema by 25%    Independent with HEP      LTGs     Pt will increase  strength by an additional 5-10#  Pt will increase wrist strength by 1-2 grade  Pt will increase wrist ROM to [facility]       Pt will demonstrate decrease in edema by 50%    Pt will increase pinch strength by 3-5#  Pt will report an increase in ADL/IADL participation          Plan  Plan details: Patient has presenting to OP OT services with a dx of s/p R CTR  Patient demonstrating decreased strength, decreased ROM and decreased activity  Pt would benefit from continued Occupational Therapy services two time per week for 4 weeks to return to prior level of function and achieve all established goals  Thank you for the referral!    Patient would benefit from: OT eval, skilled occupational therapy and custom splinting  Referral necessary: Yes  Planned modality interventions: ultrasound, unattended electrical stimulation, thermotherapy: hydrocollator packs and cryotherapy  Planned therapy interventions: massage, manual therapy, joint mobilization, patient education, strengthening, stretching, fine motor coordination training, home exercise program, self care, therapeutic exercise and therapeutic activities  Frequency: 2x week  Duration in visits: 8  Duration in weeks: 4  Treatment plan discussed with: patient        Subjective Evaluation    History of Present Illness  Onset date: Patient wife states approx 6 mo  Date of surgery: 2021  Mechanism of injury: surgery  Mechanism of injury: S/p R CTR           Not a recurrent problem   Quality of life: good    Pain  Current pain ratin  At best pain ratin  At worst pain ratin  Location: R Wrist (Patient unable to verbalize appropriately and wife stated no pain)  Relieving factors: ice    Hand dominance: right      Diagnostic Tests  EMG: abnormal  Treatments  Current treatment: occupational therapy  Patient Goals  Patient goals for therapy: decreased edema, increased motion, increased strength and independence with ADLs/IADLs          Objective     Observations     Right Wrist/Hand   Positive for incision       Additional Observation Details  Patient presenting with a 4 cm incision along volar aspect of right wrist with slight red drainage noted and white macerated skin along incision  Sutures are intact  Neurological Testing     Sensation     Wrist/Hand   Left   Intact: light touch    Right   Intact: light touch    Additional Neurological Details  Patient does not report numbness at this time    Active Range of Motion     Left Wrist   Normal active range of motion    Right Wrist   Wrist flexion: 10 degrees   Wrist extension: 15 degrees   Radial deviation: 10 degrees   Ulnar deviation: 15 degrees     Left Thumb   Opposition: WNL    Right Thumb   Opposition: Oppose to IF and MF    Additional Active Range of Motion Details  Patient demonstrating decreased right wrist ROM as compared to norms  Patient demonstrating with soft composite fist with 6 cm difference from tip to finger to distal palmar crease     Strength/Myotome Testing     Left Wrist/Hand   Normal wrist strength     (2nd hand position)     Trial 1: 30    Thumb Strength  Key/Lateral Pinch     Trial 1: 15    Right Wrist/Hand   Wrist extension: 3-  Wrist flexion: 3-  Radial deviation: 3-  Ulnar deviation: 3-     (2nd hand position)     Trial 1: 0    Thumb Strength   Key/Lateral Pinch     Trial 1: 0    Additional Strength Details   and pinch strength NT at this time    Swelling     Left Wrist/Hand   Circumference wrist: 18 8 cm    Right Wrist/Hand   Circumference wrist: 19 5 cm    Additional Swelling Details  Increase in swelling noted as compared to left  Neuro Exam:     Functional outcomes   Functional outcome comment: 9-hole testing due to hearing deficits       Patient tolerated session well  Patient's wife and therapist discussed exercises as per initial HEP  Patient's wife verbalized understanding of education and demonstrated proper technique  Therapist will make increases as tolerated  Continue with POC  Therapist provided patient with additional gauze and coban for dressing changes             Precautions s/p R CTR       Manuals 09/23/2021       Scar Massage Dressing Changed       Manual Massage        PROM Wrist All Planes        Edema Massage                Neuro Re-Ed  09/23/2021                                       Ther Ex 09/23/2021       TGE        Median Nerve Glide        Prayer Stretch        Wrist AROM   Flex/Ext  RD/UD HEP       Digi-Flex        Wrist Maze        Hand Power Pro        Wrist Iso  Flexion  Extension  RD  UD            Hand Open/Close HEP               Ther Activity 09/23/2021       Tension Pin w/ Pom Pom                                        Modalities 09/23/2021       Ultrasound                CP Performed at end of session

## 2021-09-27 ENCOUNTER — OFFICE VISIT (OUTPATIENT)
Dept: OCCUPATIONAL THERAPY | Facility: CLINIC | Age: 81
End: 2021-09-27
Payer: MEDICARE

## 2021-09-27 DIAGNOSIS — Z98.890 STATUS POST CARPAL TUNNEL RELEASE: Primary | ICD-10-CM

## 2021-09-27 PROCEDURE — 97110 THERAPEUTIC EXERCISES: CPT

## 2021-09-27 PROCEDURE — 97140 MANUAL THERAPY 1/> REGIONS: CPT

## 2021-09-27 NOTE — PROGRESS NOTES
Daily Note     Today's date: 2021  Patient name: Rusty Aguillon  : 1940  MRN: [id number]  Referring provider: Carline Sethi MD  Dx:   Encounter Diagnosis     ICD-10-CM    1  Status post carpal tunnel release  [id number]                   Subjective: Patient nodded his head to express that he was feeling ok  Objective: See treatment diary below      Assessment: Tolerated treatment well  Patient exhibited good technique with therapeutic exercises and would benefit from continued OT  Patient presented with incision site wrapped in coban  Wife was compliant with aiding in incision care at home  Inflammation and coloration of incision site improved since last visit  Patient was introduced to a series of therapeutic exercises  Patient performed prayer stretch with facilitation from therapist to ensure the patient kept hands together for the duration of the stretch  Patient has follow-up appointment to remove stitches on 2021  Therapist wrapped incision site with coban at the end of session  Plan: Continue per plan of care  Progress treatment as tolerated            Precautions s/p R CTR       Manuals 2021      Scar Massage Dressing Changed       Manual Massage        PROM Wrist All Planes  Performed gentle  extension       Edema Massage  Performed on hand and wrist avoiding incision site               Neuro Re-Ed  2021                                      Ther Ex 2021      TGE        Median Nerve Glide  3 sec X 7       Prayer Stretch  10 sec X 3       Wrist AROM   Flex/Ext  RD/UD HEP   X 20   X 20       Digi-Flex        Wrist Maze  X 3       Hand Power Pro        Wrist Iso  Flexion  Extension  RD  UD            Hand Open/Close HEP X 20       Opposition   X 10               Ther Activity 2021       Tension Pin w/ Pom Pom                                        Modalities 2021       Ultrasound                CP Performed at end of session Performed at end of session

## 2021-09-29 ENCOUNTER — APPOINTMENT (OUTPATIENT)
Dept: OCCUPATIONAL THERAPY | Facility: CLINIC | Age: 81
End: 2021-09-29
Payer: MEDICARE

## 2021-09-29 ENCOUNTER — OFFICE VISIT (OUTPATIENT)
Dept: OBGYN CLINIC | Facility: CLINIC | Age: 81
End: 2021-09-29

## 2021-09-29 VITALS
HEART RATE: 71 BPM | DIASTOLIC BLOOD PRESSURE: 75 MMHG | BODY MASS INDEX: 26.08 KG/M2 | SYSTOLIC BLOOD PRESSURE: 134 MMHG | HEIGHT: 71 IN

## 2021-09-29 DIAGNOSIS — Z98.890 STATUS POST CARPAL TUNNEL RELEASE: Primary | ICD-10-CM

## 2021-09-29 PROCEDURE — 99024 POSTOP FOLLOW-UP VISIT: CPT | Performed by: ORTHOPAEDIC SURGERY

## 2021-10-04 ENCOUNTER — OFFICE VISIT (OUTPATIENT)
Dept: OCCUPATIONAL THERAPY | Facility: CLINIC | Age: 81
End: 2021-10-04
Payer: MEDICARE

## 2021-10-04 ENCOUNTER — OFFICE VISIT (OUTPATIENT)
Dept: FAMILY MEDICINE CLINIC | Facility: CLINIC | Age: 81
End: 2021-10-04

## 2021-10-04 VITALS
TEMPERATURE: 98.6 F | BODY MASS INDEX: 26.04 KG/M2 | DIASTOLIC BLOOD PRESSURE: 70 MMHG | WEIGHT: 186 LBS | HEART RATE: 69 BPM | SYSTOLIC BLOOD PRESSURE: 140 MMHG | OXYGEN SATURATION: 97 % | HEIGHT: 71 IN

## 2021-10-04 DIAGNOSIS — G56.01 CARPAL TUNNEL SYNDROME ON RIGHT: ICD-10-CM

## 2021-10-04 DIAGNOSIS — G56.01 CARPAL TUNNEL SYNDROME ON RIGHT: Primary | ICD-10-CM

## 2021-10-04 DIAGNOSIS — Z98.890 STATUS POST CARPAL TUNNEL RELEASE: Primary | ICD-10-CM

## 2021-10-04 PROCEDURE — 97140 MANUAL THERAPY 1/> REGIONS: CPT

## 2021-10-04 PROCEDURE — 97110 THERAPEUTIC EXERCISES: CPT

## 2021-10-06 ENCOUNTER — OFFICE VISIT (OUTPATIENT)
Dept: OCCUPATIONAL THERAPY | Facility: CLINIC | Age: 81
End: 2021-10-06
Payer: MEDICARE

## 2021-10-06 DIAGNOSIS — Z98.890 STATUS POST CARPAL TUNNEL RELEASE: Primary | ICD-10-CM

## 2021-10-06 PROCEDURE — 97140 MANUAL THERAPY 1/> REGIONS: CPT

## 2021-10-06 PROCEDURE — 97110 THERAPEUTIC EXERCISES: CPT

## 2021-10-11 ENCOUNTER — OFFICE VISIT (OUTPATIENT)
Dept: OCCUPATIONAL THERAPY | Facility: CLINIC | Age: 81
End: 2021-10-11
Payer: MEDICARE

## 2021-10-11 DIAGNOSIS — Z98.890 STATUS POST CARPAL TUNNEL RELEASE: Primary | ICD-10-CM

## 2021-10-11 PROCEDURE — 97140 MANUAL THERAPY 1/> REGIONS: CPT

## 2021-10-11 PROCEDURE — 97110 THERAPEUTIC EXERCISES: CPT

## 2021-10-13 ENCOUNTER — OFFICE VISIT (OUTPATIENT)
Dept: OCCUPATIONAL THERAPY | Facility: CLINIC | Age: 81
End: 2021-10-13
Payer: MEDICARE

## 2021-10-13 DIAGNOSIS — Z98.890 STATUS POST CARPAL TUNNEL RELEASE: Primary | ICD-10-CM

## 2021-10-13 PROCEDURE — 97140 MANUAL THERAPY 1/> REGIONS: CPT

## 2021-10-13 PROCEDURE — 97110 THERAPEUTIC EXERCISES: CPT

## 2021-10-18 ENCOUNTER — OFFICE VISIT (OUTPATIENT)
Dept: OCCUPATIONAL THERAPY | Facility: CLINIC | Age: 81
End: 2021-10-18
Payer: MEDICARE

## 2021-10-18 DIAGNOSIS — Z98.890 STATUS POST CARPAL TUNNEL RELEASE: Primary | ICD-10-CM

## 2021-10-18 PROCEDURE — 97140 MANUAL THERAPY 1/> REGIONS: CPT

## 2021-10-18 PROCEDURE — 97110 THERAPEUTIC EXERCISES: CPT

## 2021-10-20 ENCOUNTER — OFFICE VISIT (OUTPATIENT)
Dept: OCCUPATIONAL THERAPY | Facility: CLINIC | Age: 81
End: 2021-10-20
Payer: MEDICARE

## 2021-10-20 DIAGNOSIS — Z98.890 STATUS POST CARPAL TUNNEL RELEASE: Primary | ICD-10-CM

## 2021-10-20 PROCEDURE — 97110 THERAPEUTIC EXERCISES: CPT

## 2021-10-25 ENCOUNTER — OFFICE VISIT (OUTPATIENT)
Dept: OCCUPATIONAL THERAPY | Facility: CLINIC | Age: 81
End: 2021-10-25
Payer: MEDICARE

## 2021-10-25 DIAGNOSIS — Z98.890 STATUS POST CARPAL TUNNEL RELEASE: Primary | ICD-10-CM

## 2021-10-25 DIAGNOSIS — G56.01 CARPAL TUNNEL SYNDROME ON RIGHT: ICD-10-CM

## 2021-10-25 PROCEDURE — 97110 THERAPEUTIC EXERCISES: CPT

## 2021-10-25 PROCEDURE — 97140 MANUAL THERAPY 1/> REGIONS: CPT

## 2021-10-27 ENCOUNTER — EVALUATION (OUTPATIENT)
Dept: OCCUPATIONAL THERAPY | Facility: CLINIC | Age: 81
End: 2021-10-27
Payer: MEDICARE

## 2021-10-27 DIAGNOSIS — Z98.890 STATUS POST CARPAL TUNNEL RELEASE: Primary | ICD-10-CM

## 2021-10-27 DIAGNOSIS — G56.01 CARPAL TUNNEL SYNDROME ON RIGHT: ICD-10-CM

## 2021-10-27 PROCEDURE — 97140 MANUAL THERAPY 1/> REGIONS: CPT

## 2021-10-27 PROCEDURE — 97110 THERAPEUTIC EXERCISES: CPT

## 2021-11-01 ENCOUNTER — IMMUNIZATIONS (OUTPATIENT)
Dept: FAMILY MEDICINE CLINIC | Facility: HOSPITAL | Age: 81
End: 2021-11-01

## 2021-11-01 ENCOUNTER — OFFICE VISIT (OUTPATIENT)
Dept: OCCUPATIONAL THERAPY | Facility: CLINIC | Age: 81
End: 2021-11-01
Payer: MEDICARE

## 2021-11-01 DIAGNOSIS — Z23 ENCOUNTER FOR IMMUNIZATION: Primary | ICD-10-CM

## 2021-11-01 DIAGNOSIS — Z98.890 STATUS POST CARPAL TUNNEL RELEASE: Primary | ICD-10-CM

## 2021-11-01 PROCEDURE — 97140 MANUAL THERAPY 1/> REGIONS: CPT

## 2021-11-01 PROCEDURE — 97110 THERAPEUTIC EXERCISES: CPT

## 2021-11-03 ENCOUNTER — OFFICE VISIT (OUTPATIENT)
Dept: OCCUPATIONAL THERAPY | Facility: CLINIC | Age: 81
End: 2021-11-03
Payer: MEDICARE

## 2021-11-03 DIAGNOSIS — Z98.890 STATUS POST CARPAL TUNNEL RELEASE: Primary | ICD-10-CM

## 2021-11-03 PROCEDURE — 97140 MANUAL THERAPY 1/> REGIONS: CPT

## 2021-11-03 PROCEDURE — 97110 THERAPEUTIC EXERCISES: CPT

## 2021-11-05 ENCOUNTER — OFFICE VISIT (OUTPATIENT)
Dept: FAMILY MEDICINE CLINIC | Facility: CLINIC | Age: 81
End: 2021-11-05
Payer: MEDICARE

## 2021-11-05 VITALS
TEMPERATURE: 97 F | SYSTOLIC BLOOD PRESSURE: 122 MMHG | HEIGHT: 71 IN | WEIGHT: 183 LBS | BODY MASS INDEX: 25.62 KG/M2 | OXYGEN SATURATION: 98 % | DIASTOLIC BLOOD PRESSURE: 68 MMHG | HEART RATE: 78 BPM

## 2021-11-05 DIAGNOSIS — G56.01 CARPAL TUNNEL SYNDROME ON RIGHT: Primary | ICD-10-CM

## 2021-11-05 DIAGNOSIS — E78.2 MIXED HYPERLIPIDEMIA: ICD-10-CM

## 2021-11-05 PROCEDURE — 99213 OFFICE O/P EST LOW 20 MIN: CPT | Performed by: FAMILY MEDICINE

## 2021-11-09 ENCOUNTER — OFFICE VISIT (OUTPATIENT)
Dept: OCCUPATIONAL THERAPY | Facility: CLINIC | Age: 81
End: 2021-11-09
Payer: MEDICARE

## 2021-11-09 DIAGNOSIS — M25.532 LEFT WRIST PAIN: ICD-10-CM

## 2021-11-09 DIAGNOSIS — Z98.890 STATUS POST CARPAL TUNNEL RELEASE: Primary | ICD-10-CM

## 2021-11-09 PROCEDURE — 97110 THERAPEUTIC EXERCISES: CPT

## 2021-11-09 PROCEDURE — 97140 MANUAL THERAPY 1/> REGIONS: CPT

## 2021-11-10 ENCOUNTER — OFFICE VISIT (OUTPATIENT)
Dept: OBGYN CLINIC | Facility: CLINIC | Age: 81
End: 2021-11-10

## 2021-11-10 VITALS
DIASTOLIC BLOOD PRESSURE: 77 MMHG | BODY MASS INDEX: 25.62 KG/M2 | HEIGHT: 71 IN | WEIGHT: 183 LBS | HEART RATE: 84 BPM | SYSTOLIC BLOOD PRESSURE: 122 MMHG

## 2021-11-10 DIAGNOSIS — Z98.890 STATUS POST CARPAL TUNNEL RELEASE: Primary | ICD-10-CM

## 2021-11-10 PROCEDURE — 99024 POSTOP FOLLOW-UP VISIT: CPT | Performed by: ORTHOPAEDIC SURGERY

## 2021-11-12 ENCOUNTER — OFFICE VISIT (OUTPATIENT)
Dept: OCCUPATIONAL THERAPY | Facility: CLINIC | Age: 81
End: 2021-11-12
Payer: MEDICARE

## 2021-11-12 DIAGNOSIS — Z98.890 STATUS POST CARPAL TUNNEL RELEASE: Primary | ICD-10-CM

## 2021-11-12 PROCEDURE — 97140 MANUAL THERAPY 1/> REGIONS: CPT

## 2021-11-12 PROCEDURE — 97110 THERAPEUTIC EXERCISES: CPT

## 2021-12-06 ENCOUNTER — IMMUNIZATIONS (OUTPATIENT)
Dept: FAMILY MEDICINE CLINIC | Facility: CLINIC | Age: 81
End: 2021-12-06
Payer: MEDICARE

## 2021-12-06 DIAGNOSIS — Z23 ENCOUNTER FOR IMMUNIZATION: Primary | ICD-10-CM

## 2021-12-06 PROCEDURE — 90662 IIV NO PRSV INCREASED AG IM: CPT

## 2021-12-06 PROCEDURE — G0008 ADMIN INFLUENZA VIRUS VAC: HCPCS

## 2021-12-17 ENCOUNTER — APPOINTMENT (OUTPATIENT)
Dept: LAB | Facility: CLINIC | Age: 81
End: 2021-12-17
Payer: MEDICARE

## 2021-12-17 DIAGNOSIS — E78.2 MIXED HYPERLIPIDEMIA: ICD-10-CM

## 2021-12-17 LAB
ALBUMIN SERPL BCP-MCNC: 3.9 G/DL (ref 3.5–5)
ALP SERPL-CCNC: 90 U/L (ref 46–116)
ALT SERPL W P-5'-P-CCNC: 29 U/L (ref 12–78)
ANION GAP SERPL CALCULATED.3IONS-SCNC: 6 MMOL/L (ref 4–13)
AST SERPL W P-5'-P-CCNC: 19 U/L (ref 5–45)
BASOPHILS # BLD AUTO: 0.04 THOUSANDS/ΜL (ref 0–0.1)
BASOPHILS NFR BLD AUTO: 1 % (ref 0–1)
BILIRUB SERPL-MCNC: 0.75 MG/DL (ref 0.2–1)
BUN SERPL-MCNC: 15 MG/DL (ref 5–25)
CALCIUM SERPL-MCNC: 9.1 MG/DL (ref 8.3–10.1)
CHLORIDE SERPL-SCNC: 106 MMOL/L (ref 100–108)
CHOLEST SERPL-MCNC: 202 MG/DL
CO2 SERPL-SCNC: 27 MMOL/L (ref 21–32)
CREAT SERPL-MCNC: 0.87 MG/DL (ref 0.6–1.3)
EOSINOPHIL # BLD AUTO: 0.2 THOUSAND/ΜL (ref 0–0.61)
EOSINOPHIL NFR BLD AUTO: 3 % (ref 0–6)
ERYTHROCYTE [DISTWIDTH] IN BLOOD BY AUTOMATED COUNT: 13.6 % (ref 11.6–15.1)
GFR SERPL CREATININE-BSD FRML MDRD: 80 ML/MIN/1.73SQ M
GLUCOSE P FAST SERPL-MCNC: 91 MG/DL (ref 65–99)
HCT VFR BLD AUTO: 46.3 % (ref 36.5–49.3)
HDLC SERPL-MCNC: 58 MG/DL
HGB BLD-MCNC: 15.1 G/DL (ref 12–17)
IMM GRANULOCYTES # BLD AUTO: 0.01 THOUSAND/UL (ref 0–0.2)
IMM GRANULOCYTES NFR BLD AUTO: 0 % (ref 0–2)
LDLC SERPL CALC-MCNC: 133 MG/DL (ref 0–100)
LYMPHOCYTES # BLD AUTO: 1.18 THOUSANDS/ΜL (ref 0.6–4.47)
LYMPHOCYTES NFR BLD AUTO: 19 % (ref 14–44)
MCH RBC QN AUTO: 31.7 PG (ref 26.8–34.3)
MCHC RBC AUTO-ENTMCNC: 32.6 G/DL (ref 31.4–37.4)
MCV RBC AUTO: 97 FL (ref 82–98)
MONOCYTES # BLD AUTO: 0.67 THOUSAND/ΜL (ref 0.17–1.22)
MONOCYTES NFR BLD AUTO: 11 % (ref 4–12)
NEUTROPHILS # BLD AUTO: 4 THOUSANDS/ΜL (ref 1.85–7.62)
NEUTS SEG NFR BLD AUTO: 66 % (ref 43–75)
NONHDLC SERPL-MCNC: 144 MG/DL
NRBC BLD AUTO-RTO: 0 /100 WBCS
PLATELET # BLD AUTO: 409 THOUSANDS/UL (ref 149–390)
PMV BLD AUTO: 9.7 FL (ref 8.9–12.7)
POTASSIUM SERPL-SCNC: 4.3 MMOL/L (ref 3.5–5.3)
PROT SERPL-MCNC: 7.6 G/DL (ref 6.4–8.2)
RBC # BLD AUTO: 4.77 MILLION/UL (ref 3.88–5.62)
SODIUM SERPL-SCNC: 139 MMOL/L (ref 136–145)
TRIGL SERPL-MCNC: 56 MG/DL
WBC # BLD AUTO: 6.1 THOUSAND/UL (ref 4.31–10.16)

## 2021-12-17 PROCEDURE — 80053 COMPREHEN METABOLIC PANEL: CPT

## 2021-12-17 PROCEDURE — 85025 COMPLETE CBC W/AUTO DIFF WBC: CPT

## 2021-12-17 PROCEDURE — 80061 LIPID PANEL: CPT

## 2021-12-17 PROCEDURE — 36415 COLL VENOUS BLD VENIPUNCTURE: CPT

## 2022-01-10 ENCOUNTER — OFFICE VISIT (OUTPATIENT)
Dept: FAMILY MEDICINE CLINIC | Facility: CLINIC | Age: 82
End: 2022-01-10
Payer: MEDICARE

## 2022-01-10 VITALS
HEART RATE: 77 BPM | DIASTOLIC BLOOD PRESSURE: 70 MMHG | WEIGHT: 182.4 LBS | OXYGEN SATURATION: 100 % | SYSTOLIC BLOOD PRESSURE: 128 MMHG | TEMPERATURE: 96.7 F | HEIGHT: 71 IN | BODY MASS INDEX: 25.54 KG/M2

## 2022-01-10 DIAGNOSIS — R03.0 BORDERLINE HYPERTENSION: ICD-10-CM

## 2022-01-10 DIAGNOSIS — G56.01 CARPAL TUNNEL SYNDROME ON RIGHT: Primary | ICD-10-CM

## 2022-01-10 DIAGNOSIS — E78.00 HYPERCHOLESTEROLEMIA: ICD-10-CM

## 2022-01-10 PROCEDURE — 99214 OFFICE O/P EST MOD 30 MIN: CPT | Performed by: FAMILY MEDICINE

## 2022-01-10 NOTE — PROGRESS NOTES
Assessment/Plan:    No problem-specific Assessment & Plan notes found for this encounter  Diagnoses and all orders for this visit:    Carpal tunnel syndrome on right  Comments:  Doing well, completed OT    Borderline hypertension  Comments:  BP excellent off of medication  Orders:  -     Comprehensive metabolic panel; Future    Hypercholesterolemia  Comments:  Tchol 202, LDL elevated, resume statin  Re-check in 4m  Orders:  -     Lipid panel; Future          PHQ-2/9 Depression Screening    Little interest or pleasure in doing things: 0 - not at all  Feeling down, depressed, or hopeless: 0 - not at all  PHQ-2 Score: 0  PHQ-2 Interpretation: Negative depression screen            Subjective:      Patient ID: Rani Iraheta is a 79 y o  male  Patient presents for follow up of his chronic conditions, he feels well overall  BP is stable  We reviewed his labs  The following portions of the patient's history were reviewed and updated as appropriate: allergies, current medications, past family history, past medical history, past social history, past surgical history and problem list     Review of Systems   Constitutional: Negative  Negative for chills, fatigue and fever  HENT: Negative  Eyes: Negative  Negative for visual disturbance  Respiratory: Negative for shortness of breath and wheezing  Cardiovascular: Negative for chest pain and palpitations  Gastrointestinal: Negative for abdominal pain, blood in stool, constipation, diarrhea, nausea and vomiting  Endocrine: Negative  Genitourinary: Negative for difficulty urinating and dysuria  Musculoskeletal: Negative for arthralgias and myalgias  Skin: Negative  Allergic/Immunologic: Negative  Neurological: Negative for dizziness, seizures, syncope, weakness and headaches  Hematological: Negative for adenopathy  Psychiatric/Behavioral: Negative  Negative for dysphoric mood  The patient is not nervous/anxious  Objective:    /70 (BP Location: Left arm, Patient Position: Sitting)   Pulse 77   Temp (!) 96 7 °F (35 9 °C) (Tympanic)   Ht 5' 11" (1 803 m)   Wt 82 7 kg (182 lb 6 4 oz)   SpO2 100%   BMI 25 44 kg/m²      Physical Exam  Vitals and nursing note reviewed  Constitutional:       General: He is not in acute distress  Appearance: Normal appearance  He is not ill-appearing or diaphoretic  HENT:      Head: Normocephalic and atraumatic  Eyes:      Conjunctiva/sclera: Conjunctivae normal    Neck:      Vascular: No carotid bruit  Cardiovascular:      Rate and Rhythm: Normal rate and regular rhythm  Heart sounds: Normal heart sounds  No murmur heard  Pulmonary:      Effort: Pulmonary effort is normal  No respiratory distress  Breath sounds: Normal breath sounds  No wheezing, rhonchi or rales  Abdominal:      General: Abdomen is flat  There is no distension  Palpations: Abdomen is soft  There is no mass  Tenderness: There is no abdominal tenderness  There is no guarding or rebound  Musculoskeletal:      Cervical back: Normal range of motion and neck supple  Right lower leg: No edema  Left lower leg: No edema  Lymphadenopathy:      Cervical: No cervical adenopathy  Neurological:      General: No focal deficit present  Mental Status: He is alert and oriented to person, place, and time  Psychiatric:         Mood and Affect: Mood normal          Behavior: Behavior normal          Thought Content:  Thought content normal          Judgment: Judgment normal

## 2022-04-12 ENCOUNTER — TELEPHONE (OUTPATIENT)
Dept: NEUROSURGERY | Facility: CLINIC | Age: 82
End: 2022-04-12

## 2022-04-12 ENCOUNTER — APPOINTMENT (EMERGENCY)
Dept: CT IMAGING | Facility: HOSPITAL | Age: 82
End: 2022-04-12
Payer: MEDICARE

## 2022-04-12 ENCOUNTER — HOSPITAL ENCOUNTER (EMERGENCY)
Facility: HOSPITAL | Age: 82
Discharge: HOME | End: 2022-04-12
Attending: EMERGENCY MEDICINE
Payer: MEDICARE

## 2022-04-12 VITALS
WEIGHT: 180 LBS | DIASTOLIC BLOOD PRESSURE: 76 MMHG | HEART RATE: 80 BPM | TEMPERATURE: 98.6 F | HEIGHT: 71 IN | OXYGEN SATURATION: 97 % | BODY MASS INDEX: 25.2 KG/M2 | RESPIRATION RATE: 17 BRPM | SYSTOLIC BLOOD PRESSURE: 120 MMHG

## 2022-04-12 DIAGNOSIS — G96.08 SUBDURAL HYGROMA: ICD-10-CM

## 2022-04-12 DIAGNOSIS — S09.90XA INJURY OF HEAD, INITIAL ENCOUNTER: Primary | ICD-10-CM

## 2022-04-12 PROCEDURE — 12002 RPR S/N/AX/GEN/TRNK2.6-7.5CM: CPT

## 2022-04-12 PROCEDURE — 70450 CT HEAD/BRAIN W/O DYE: CPT

## 2022-04-12 PROCEDURE — G1004 CDSM NDSC: HCPCS

## 2022-04-12 PROCEDURE — 99284 EMERGENCY DEPT VISIT MOD MDM: CPT

## 2022-04-12 RX ORDER — LIDOCAINE HYDROCHLORIDE 10 MG/ML
10 INJECTION, SOLUTION EPIDURAL; INFILTRATION; INTRACAUDAL; PERINEURAL ONCE
Status: COMPLETED | OUTPATIENT
Start: 2022-04-12 | End: 2022-04-12

## 2022-04-12 RX ORDER — LIDOCAINE HYDROCHLORIDE 10 MG/ML
INJECTION, SOLUTION EPIDURAL; INFILTRATION; INTRACAUDAL; PERINEURAL
Status: DISCONTINUED
Start: 2022-04-12 | End: 2022-04-12 | Stop reason: HOSPADM

## 2022-04-12 RX ADMIN — LIDOCAINE HYDROCHLORIDE 5 ML: 10 INJECTION, SOLUTION EPIDURAL; INFILTRATION; INTRACAUDAL; PERINEURAL at 09:38

## 2022-04-12 NOTE — TELEPHONE ENCOUNTER
4/12/22- PT [address] Mathur F/U SCHEDULED 4/27/22  CT HEAD SCHEDULED 4/19/22  [place] CONFIRMED THIS APT    ----- Message from Pili Carroll sent at 4/12/2022 12:54 PM EDT -----  Can we please schedule an appointment for this gentleman in 2 weeks with AP? I have ordered CT head      Thanks

## 2022-04-12 NOTE — ED PROVIDER NOTES
History  Chief Complaint   Patient presents with    Fall     knee gave out and fell forward hitting his head  multiple falls and head injury recently  79year old male with HLD presents to the ED with a head injury after a fall down stairs at approximately 5am this morning  His wife accompanied him to the ED and is serving as primary historian due to the extent of his hearing loss and borderline dementia  She did not witness the event, but gathered from his story of events, his knee gave out coming down the stairs and he slipped down 3-4 steps and hit his head on a door frame  He now has a bleeding head injury and bruise on the front of his scalp on his hairline  He denies injuring any other location and denies headache, dizziness, and vision changes  He is not on any blood thinners  No history of stroke, prior head injury, and brain surgery  Wife states he is acting appropriately  Patient is concerned about his left knee that has been bothering him for a while  He fell off of a ladder approximately 1 week ago due to his knee "giving out "  There was no witnesses to this incident and patient did not seek care for this  He is scheduled to see his PCP for this issue, but wife is requesting an orthopedist follow up  Patient took aleve after the incident which is controlling his pain         History provided by:  Patient and spouse   used: No    Fall  Mechanism of injury: fall    Injury location:  Head/neck  Head/neck injury location:  Scalp  Incident location:  Home  Time since incident:  1 hour  Arrived directly from scene: yes    Fall:     Fall occurred:  Down stairs    Height of fall:  3-4 steps    Impact surface:  Wall (door frame)    Point of impact:  Head  Protective equipment: none    Suspicion of alcohol use: no    Suspicion of drug use: no    Current pain details:     Pain quality:  Aching (controlled with aleve)    Pain Severity:  Mild  Associated symptoms: hearing loss (baseline hearing loss)    Associated symptoms: no abdominal pain, no back pain, no blindness, no chest pain, no difficulty breathing, no headaches, no loss of consciousness, no nausea, no neck pain, no seizures and no vomiting    Risk factors: no anticoagulation therapy        Prior to Admission Medications   Prescriptions Last Dose Informant Patient Reported? Taking? Multiple Vitamin (MULTIVITAMIN) tablet  Spouse/Significant Other Yes No   Sig: Take by mouth daily    Multiple Vitamins-Minerals (PRESERVISION AREDS 2 PO)  Spouse/Significant Other Yes No   Sig: Take by mouth   ascorbic acid (VITAMIN C) 500 mg tablet  Spouse/Significant Other Yes No   Sig: Take 500 mg by mouth daily    cholecalciferol (VITAMIN D3) 400 units tablet  Spouse/Significant Other Yes No   Sig: Take 400 Units by mouth daily   co-enzyme Q-10 30 MG capsule  Spouse/Significant Other Yes No   Sig: Take 30 mg by mouth 3 (three) times a day   simvastatin (ZOCOR) 20 mg tablet  Spouse/Significant Other Yes No   Sig: Take 20 mg by mouth daily at bedtime      Facility-Administered Medications: None       Past Medical History:   Diagnosis Date    Arthritis     Encounter for general adult medical examination without abnormal findings 3/20/2019    Hyperlipidemia        Past Surgical History:   Procedure Laterality Date    HERNIA REPAIR Right     inguinal    NC REVISE MEDIAN N/CARPAL TUNNEL SURG Right 9/20/2021    Procedure: RELEASE CARPAL TUNNEL;  Surgeon: Morelia Melgar MD;  Location: [facility];  Service: Orthopedics       History reviewed  No pertinent family history  I have reviewed and agree with the history as documented      E-Cigarette/Vaping    E-Cigarette Use Never User      E-Cigarette/Vaping Substances    Nicotine No     THC No     CBD No     Flavoring No     Other No     Unknown No      Social History     Tobacco Use    Smoking status: Former Smoker    Smokeless tobacco: Never Used    Tobacco comment: Smoked as a teenager   Vaping Use  Vaping Use: Never used   Substance Use Topics    Alcohol use: Yes     Comment: Rare    Drug use: No       Review of Systems   Constitutional: Negative for chills and fever  HENT: Positive for hearing loss (baseline hearing loss)  Negative for drooling, ear pain, sinus pain, sore throat and trouble swallowing  Eyes: Negative for blindness, pain, discharge and visual disturbance  Respiratory: Negative for cough and shortness of breath  Cardiovascular: Negative for chest pain and palpitations  Gastrointestinal: Negative for abdominal pain, nausea and vomiting  Genitourinary: Negative for dysuria and hematuria  Musculoskeletal: Negative for arthralgias, back pain and neck pain  Skin: Positive for wound  Negative for color change and rash  Neurological: Negative for dizziness, seizures, loss of consciousness, syncope, facial asymmetry, weakness, light-headedness, numbness and headaches  Psychiatric/Behavioral: Negative for confusion  All other systems reviewed and are negative  Physical Exam  Physical Exam  Vitals and nursing note reviewed  Constitutional:       General: He is not in acute distress  Appearance: Normal appearance  He is well-developed and normal weight  HENT:      Head: Normocephalic  Right Ear: Ear canal and external ear normal       Left Ear: Ear canal and external ear normal       Nose: Nose normal       Mouth/Throat:      Mouth: Mucous membranes are moist       Pharynx: Oropharynx is clear  Eyes:      General: No scleral icterus  Right eye: No discharge  Left eye: No discharge  Extraocular Movements: Extraocular movements intact  Conjunctiva/sclera: Conjunctivae normal       Pupils: Pupils are equal, round, and reactive to light  Cardiovascular:      Rate and Rhythm: Normal rate  Pulses: Normal pulses  Heart sounds: Normal heart sounds  Pulmonary:      Effort: Pulmonary effort is normal  No respiratory distress  Breath sounds: Normal breath sounds  Abdominal:      General: Abdomen is flat  Palpations: Abdomen is soft  Musculoskeletal:         General: No swelling, tenderness or signs of injury  Normal range of motion  Cervical back: Normal range of motion and neck supple  No rigidity  Skin:     General: Skin is warm and dry  Capillary Refill: Capillary refill takes less than 2 seconds  Findings: No bruising, erythema or rash  Neurological:      General: No focal deficit present  Mental Status: He is alert  Mental status is at baseline  Comments: Borderline dementia at baseline according to wife  Wife states that his behavior is baseline  Psychiatric:         Mood and Affect: Mood normal          Behavior: Behavior normal          Vital Signs  ED Triage Vitals [04/12/22 0903]   Temperature Pulse Respirations Blood Pressure SpO2   98 6 °F (37 °C) 93 16 138/77 97 %      Temp Source Heart Rate Source Patient Position - Orthostatic VS BP Location FiO2 (%)   Tympanic Monitor Sitting Left arm --      Pain Score       No Pain           Vitals:    04/12/22 0903 04/12/22 1115 04/12/22 1215   BP: 138/77 118/72 120/76   Pulse: 93 84 80   Patient Position - Orthostatic VS: Sitting           Visual Acuity  Visual Acuity      Most Recent Value   L Pupil Size (mm) 3   R Pupil Size (mm) 3          ED Medications  Medications   lidocaine (PF) (XYLOCAINE-MPF) 1 % injection 10 mL (5 mL Infiltration Given 4/12/22 1238)       Diagnostic Studies  Results Reviewed     None                 CT head without contrast   Final Result by Lui Gregory MD (04/12 1127)   Addendum 1 of 1 by Lui Gregory MD (04/12 1127)   ADDENDUM:       I personally discussed this study with Becca Puri on    4/12/2022 at 11:27 AM                Final      New 1 0 cm thick hypodense subdural fluid collection along left frontoparietal cerebral convexity, likely hygroma    Differential includes chronic subdural hematoma  0 4 cm rightward midline shift, previously 0 2 cm  Attention on follow-up  New small scalp hematoma in right frontal vertex region  The study was marked in [facility] for immediate notification  Workstation performed: [id number]         CT head wo contrast    (Results Pending)              Procedures  Laceration repair    Date/Time: 4/12/2022 12:44 PM  Performed by: Epifanio Henderson PA-C  Authorized by: Epifanio Henderson PA-C   Consent: The procedure was performed in an emergent situation  Verbal consent obtained  Risks and benefits: risks, benefits and alternatives were discussed  Consent given by: spouse and patient  Body area: head/neck  Location details: scalp  Laceration length: 5 cm  Foreign bodies: no foreign bodies  Tendon involvement: none  Nerve involvement: none  Anesthesia: local infiltration    Anesthesia:  Local Anesthetic: lidocaine 1% without epinephrine  Anesthetic total: 7 mL    Sedation:  Patient sedated: no      Wound Dehiscence:  Superficial Wound Dehiscence: simple closure      Procedure Details:  Preparation: Patient was prepped and draped in the usual sterile fashion  Irrigation solution: saline  Irrigation method: syringe  Amount of cleaning: standard  Debridement: none  Skin closure: staples  Number of sutures: 4  Approximation: close  Approximation difficulty: simple  Dressing: 4x4 sterile gauze  Patient tolerance: patient tolerated the procedure well with no immediate complications               ED Course  ED Course as of 04/13/22 1029   Tue Apr 12, 2022   1122 CT results: New 1 0 cm thick hypodense subdural fluid collection along left frontoparietal cerebral convexity, likely hygroma  Differential includes chronic subdural hematoma  0 4 cm rightward midline shift, previously 0 2 cm  Attention on follow-up    New small scalp hematoma in right frontal vertex region   1127 Discussed findings with radiologist  Suggests that hygroma could be an acute finding with head trauma  1145 Discussed case with trauma who recommended to contact neurosurgery  Trauma is okay with taking him if necessary  1214 Contacted neurosurg, does not feel it's necessary to get an MRI at this time or transfer with no appreciable exam findings  They believe hygroma is not traumatic and they recommend 2 week follow up with either neurosurg or PCP    3452 Neurosurg ordering head CT for him to get before his follow up visit  SBIRT 22yo+      Most Recent Value   SBIRT (22 yo +)    In order to provide better care to our patients, we are screening all of our patients for alcohol and drug use  Would it be okay to ask you these screening questions? Yes Filed at: 04/12/2022 1133   Initial Alcohol Screen: US AUDIT-C     1  How often do you have a drink containing alcohol? 0 Filed at: 04/12/2022 1133   2  How many drinks containing alcohol do you have on a typical day you are drinking? 0 Filed at: 04/12/2022 1133   3b  FEMALE Any Age, or MALE 65+: How often do you have 4 or more drinks on one occassion? 0 Filed at: 04/12/2022 1133   Audit-C Score 0 Filed at: 04/12/2022 1133   KULWANT: How many times in the past year have you    Used an illegal drug or used a prescription medication for non-medical reasons? Never Filed at: 04/12/2022 1133                    MDM  Number of Diagnoses or Management Options  Injury of head, initial encounter: new and requires workup  Diagnosis management comments: 79year old male with no pertinent past medical history presents to the ED with a head injury that he sustained earlier in the day  A 5cm laceration is noted to the right of his front hairline  Neurological exam intact, but patient has concerning history, so I ordered a non-con Head CT to evaluate for traumatic bleed     DDX: must rule out intracranial hemorrhage vs skull fracture  Head CT results are as follows: New 1 0 cm thick hypodense subdural fluid collection along left frontoparietal cerebral convexity, likely hygroma  Differential includes chronic subdural hematoma  0 4 cm rightward midline shift, previously 0 2 cm  Attention on follow-up  New small scalp hematoma in right frontal vertex region  I discussed these findings with the radiologist who believes the midline shift is a chronic, slow process, and the hygroma could be an acute traumatic response to his fall  I contacted the trauma team to hear their recommendations, and they suggested contacting neurosurgery for their input  I discussed the case with neurosurgery who informs me that the hygroma is more likely chronic and they are okay with discharging him if there are no significant exam findings  They recommended follow up in 2 weeks with PCP or neurosurgery  I discussed the CT results and my discussions with trauma and neurosurgery with the patient and his wife  I explained to them that their were new findings on the CT that warranted attention  I explained what those findings were  During my discussion, I closed his head laceration with 4 staples  I instructed them that they need to have these removed in 7-10 days by a medical provider  I instructed them not to get the area wet for 24 hours and not to submerge under water while the staples are in  I gave instructions on keeping the area clean and dry to avoid any infection  I advised them to return to the ED if any significant changes or worsening symptoms develop, and to follow up with PCP for further evaluation and staple removal  I also advised them to make an appointment with neurosurgery for adequate follow up on new hygroma and increased midline shift  Neuro ordered a repeat head CT before his appointment as well  Wife verbalizes understanding of the assessment and plan going forward  They were able to schedule their neurosurgery appointment and follow up head CT during their stay   Patient was discharged in stable condition  Amount and/or Complexity of Data Reviewed  Tests in the radiology section of CPT®: ordered and reviewed  Discussion of test results with the performing providers: yes  Obtain history from someone other than the patient: yes  Discuss the patient with other providers: yes  Independent visualization of images, tracings, or specimens: yes    Patient Progress  Patient progress: stable      Disposition  Final diagnoses:   Injury of head, initial encounter     Time reflects when diagnosis was documented in both MDM as applicable and the Disposition within this note     Time User Action Codes Description Comment    4/12/2022 12:44 PM Robert Doshi Add [S09 90XA] Injury of head, initial encounter     4/12/2022 12:53 PM Jerald Perez Add [G96 08] Subdural hygroma       ED Disposition     ED Disposition Condition Date/Time Comment    Discharge Stable Tue Apr 12, 2022 12:44 PM [facility] discharge to home/self care  Follow-up Information     Follow up With Specialties Details Why Contact Info Additional [id number] [facility],  Family Medicine Call in 1 day follow up for further evaluation of symptoms [address] [facility]    [address] [address]  [phone]       [facility] Emergency Department Emergency Medicine Go to  If symptoms worsen [address]  [place] [id number]  [facility] Emergency Department, [address] Neurosurgery Call in 1 day to schedule appointment and follow up for further evaluation [address] [facility] [address] [facility] [address]  [facility], [address], [place], [place] [facility] Call in 3 days follow up for further evaluation of symptoms [address] [facility] [address]  [address] [facility], [address] [facility] [place], [address] [place] [address], [place], [place], [address], [phone]          Discharge Medication List as of 4/12/2022 12:56 PM      CONTINUE these medications which have NOT CHANGED    Details   ascorbic acid (VITAMIN C) 500 mg tablet Take 500 mg by mouth daily , Historical Med      cholecalciferol (VITAMIN D3) 400 units tablet Take 400 Units by mouth daily, Historical Med      co-enzyme Q-10 30 MG capsule Take 30 mg by mouth 3 (three) times a day, Historical Med      Multiple Vitamin (MULTIVITAMIN) tablet Take by mouth daily , Historical Med      Multiple Vitamins-Minerals (PRESERVISION AREDS 2 PO) Take by mouth, Historical Med      simvastatin (ZOCOR) 20 mg tablet Take 20 mg by mouth daily at bedtime, Historical Med             Outpatient Discharge Orders   CT head wo contrast   Standing Status: Future Standing Exp   Date: 04/12/26       PDMP Review       Value Time User    PDMP Reviewed  Yes 9/20/2021  7:59 AM Alexander Rivera MD          ED Provider  Electronically Signed by           Laura Alas PA-C  04/13/22 2932

## 2022-04-12 NOTE — TELEMEDICINE
On-Call Telephone Note    Contacted by HARIKA Patel Abigail 79 y o  male MRN: [id number]  Unit/Bed#: Z1 H4 Encounter: [id number]    Per provider report, patient presents with right scalp laceration s/p fall down 3 steps early this morning  No history of AC/AP use  Also states he fell off of ladder 5 days ago  History of hearing loss, HLD, borderline HTN and borderline dementia  Available past medical history,social history, surgical history, medication list, drug allergies and review of systems were reviewed  /72   Pulse 84   Temp 98 6 °F (37 °C) (Tympanic)   Resp 16   Ht 5' 11" (1 803 m)   Wt 81 6 kg (180 lb)   SpO2 98%   BMI 25 10 kg/m²      Clinical exam per provider report, GCS 15  Non focal exam     Imaging personally reviewed  CT head wo with 1 cm subdural hygroma left frontoparietal convexity vs SDH  0 4 cm midline shift, previously 0 2 cm  Assessment and Plan  1  Continue to monitor neuro exam closely  2  Hold AC/AP x 2 weeks  3  No need for transfer  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

## 2022-04-12 NOTE — DISCHARGE INSTRUCTIONS
Staples should be removed in 7-10 days by a medical provider either with your PCP or here in the ED  Please return to ED if significant changes or worsening symptoms develop  Signs of infection include increased redness, drainage, foul-smelling odor, increased pain, and warrant evaluation by a medical provider and may or may not need antibiotics  Please call to schedule an appointment for follow up with neurosurgery and get a head CT done before your appointment with them  Alternate between tylenol and ibuprofen for pain control  Please follow up with PCP for further evaluation and management  Staple Care   WHAT YOU NEED TO KNOW:   Staples are often used to close a wound  Your staples may be placed for 3 to 14 days, depending on the location of your wound  DISCHARGE INSTRUCTIONS:   Care for your wound:   Clean: You may be able to shower in 24 hours  Do not soak your wound under water  Gently wash your wound with soap and warm water daily  Lightly pat it dry  Do not cover your wound unless your healthcare provider tells you to  You may also need to clean your wound with a mixture of hydrogen peroxide and water  Ask how to do this  Do not apply ointment or cream to the wound unless your healthcare provider tells you to  Elevate:      Rest any arm or leg that has a wound on pillows above the level of your heart  Do this as often as possible for 2 days  This will help decrease swelling and pain, and help you heal faster  Minimize scarring:      Avoid sunshine on your wound to reduce scarring  Follow up with your healthcare provider as directed: You may need to return for a wound checkup 3 days after your staples are placed  Ask when you should return to get your staples removed  Staple removal:   A medical staple remover  will be used to take out your staples  Your healthcare provider will slide the tool under each staple, squeeze the handle, and gently pull the staple out  Medical tape  will be placed on your wound once your staples are removed  This will help keep your wound closed  The medical tape will fall off on its own after several days  Contact your healthcare provider if:   You have redness, pain, swelling, and pus draining from your wound  Your pain medicine does not relieve your pain  You have a fever of 101°F (38 5°C) or higher  You have an odor coming from your wound  You have questions or concerns about your condition or care  Return to the emergency department if:   Your wound reopens  You have red streaks in your skin that spread out from your wound  You have severe pain or vomiting  © Copyright [employer] 2022 Information is for End User's use only and may not be sold, redistributed or otherwise used for commercial purposes  All illustrations and images included in CareNotes® are the copyrighted property of [employer]  or Masoud Mathur  The above information is an  only  It is not intended as medical advice for individual conditions or treatments  Talk to your doctor, nurse or pharmacist before following any medical regimen to see if it is safe and effective for you

## 2022-04-13 ENCOUNTER — OFFICE VISIT (OUTPATIENT)
Dept: FAMILY MEDICINE CLINIC | Facility: CLINIC | Age: 82
End: 2022-04-13
Payer: MEDICARE

## 2022-04-13 VITALS
OXYGEN SATURATION: 95 % | DIASTOLIC BLOOD PRESSURE: 64 MMHG | WEIGHT: 178.5 LBS | HEIGHT: 71 IN | SYSTOLIC BLOOD PRESSURE: 118 MMHG | TEMPERATURE: 98.9 F | BODY MASS INDEX: 24.99 KG/M2 | HEART RATE: 90 BPM

## 2022-04-13 DIAGNOSIS — M25.362 KNEE GIVES OUT, LEFT: ICD-10-CM
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
## 2022-04-13 DIAGNOSIS — S01.81XD LACERATION OF FOREHEAD, SUBSEQUENT ENCOUNTER: ICD-10-CM
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
## 2022-04-13 PROCEDURE — 99215 OFFICE O/P EST HI 40 MIN: CPT | Performed by: FAMILY MEDICINE

## 2022-04-13 NOTE — PROGRESS NOTES
Assessment/Plan:    No problem-specific Assessment & Plan notes found for this encounter  Diagnoses and all orders for this visit:    Fall in home, subsequent encounter    Laceration of forehead, subsequent encounter  Comments:  will need appt in 8 days for staple removal     Knee gives out, left  Comments:  Check x-ray of the knee, if OA present will give CSI and  brace for stability  F/U Dr Erik Salas  Orders:  -     XR knee 4+ vw left injury; Future          PHQ-2/9 Depression Screening    Little interest or pleasure in doing things: 0 - not at all  Feeling down, depressed, or hopeless: 0 - not at all  PHQ-2 Score: 0  PHQ-2 Interpretation: Negative depression screen            Subjective:      Patient ID: Elio Moseley is a 79 y o  male  Gerhardt Sriy presents for follow-up after emergency room department evaluation for a fall down the stairs which occurred yesterday, he sustained a closed head injury and laceration to the forehead  CT of the brain showed a hygroma which may be new, he was followed by neuro surgery who will have a CT scan of his brain repeated prior to his follow-up with them in the next 1-2 weeks  Patient states he was walking down the stairs and his knee gave out and he fell down subsequent stairs striking his head  He was referred by the emergency department Orthopedics regarding the knee  Repeat CT scan on 4/19 and Neurosurgery follow up at [facility] on 4/27  There has been no change in [place] from a neurologic standpoint, no confusion, disorientation, forgetfulness, lethargy  Per patient wife he is behaving exactly the same as he has prior to his fall  No gait or balance abnormalities  The following portions of the patient's history were reviewed and updated as appropriate: allergies, current medications, past family history, past medical history, past social history, past surgical history and problem list     Review of Systems   Constitutional: Negative      HENT: Negative  Cardiovascular: Negative  Negative for chest pain, palpitations and leg swelling  Musculoskeletal: Positive for arthralgias  Negative for gait problem  Skin: Positive for wound  Neurological: Negative for dizziness, syncope, light-headedness and headaches  Objective:    /64 (BP Location: Left arm, Patient Position: Sitting, Cuff Size: Large)   Pulse 90   Temp 98 9 °F (37 2 °C) (Tympanic)   Ht 5' 11" (1 803 m)   Wt 81 kg (178 lb 8 oz)   SpO2 95%   BMI 24 90 kg/m²      Physical Exam  Vitals and nursing note reviewed  Constitutional:       General: He is not in acute distress  Appearance: Normal appearance  He is well-developed  He is not ill-appearing, toxic-appearing or diaphoretic  HENT:      Head: Normocephalic and atraumatic  Right Ear: Tympanic membrane, ear canal and external ear normal  There is no impacted cerumen  Left Ear: Tympanic membrane, ear canal and external ear normal  There is no impacted cerumen  Nose: Nose normal  No congestion or rhinorrhea  Mouth/Throat:      Mouth: Mucous membranes are moist       Pharynx: Oropharynx is clear  No oropharyngeal exudate or posterior oropharyngeal erythema  Eyes:      General: No scleral icterus  Right eye: No discharge  Left eye: No discharge  Extraocular Movements: Extraocular movements intact  Conjunctiva/sclera: Conjunctivae normal       Pupils: Pupils are equal, round, and reactive to light  Cardiovascular:      Rate and Rhythm: Normal rate and regular rhythm  Pulses: Normal pulses  Heart sounds: Normal heart sounds  No murmur heard  No friction rub  No gallop  Pulmonary:      Effort: Pulmonary effort is normal  No respiratory distress  Breath sounds: Normal breath sounds  No wheezing, rhonchi or rales  Abdominal:      General: Bowel sounds are normal  There is no distension  Palpations: Abdomen is soft  There is no mass        Tenderness: There is no abdominal tenderness  There is no guarding or rebound  Musculoskeletal:         General: No swelling or deformity  Normal range of motion  Cervical back: Normal range of motion and neck supple  No rigidity  Right lower leg: No edema  Left lower leg: No edema  Comments: Left knee; No pain with valgus or varus stress, flexion-extension of the knee without pain which is full, mild crepitations, Lachman's, Schuyler's negative   Lymphadenopathy:      Cervical: No cervical adenopathy  Skin:     General: Skin is warm and dry  Findings: No rash  Neurological:      General: No focal deficit present  Mental Status: He is alert and oriented to person, place, and time  Sensory: No sensory deficit  Motor: No weakness  Coordination: Coordination normal       Gait: Gait normal       Deep Tendon Reflexes: Reflexes are normal and symmetric  Reflexes normal    Psychiatric:         Mood and Affect: Mood normal          Behavior: Behavior normal          Thought Content:  Thought content normal          Judgment: Judgment normal

## 2022-04-13 NOTE — ED ATTENDING ATTESTATION
4/12/2022  IRobert DO, saw and evaluated the patient  I have discussed the patient with the resident/non-physician practitioner and agree with the resident's/non-physician practitioner's findings, Plan of Care, and MDM as documented in the resident's/non-physician practitioner's note, except where noted  All available labs and Radiology studies were reviewed  I was present for key portions of any procedure(s) performed by the resident/non-physician practitioner and I was immediately available to provide assistance  At this point I agree with the current assessment done in the Emergency Department  I have conducted an independent evaluation of this patient a history and physical is as follows:    Patient is a 54-year-old male who slipped and fell, hitting his head on a door frame  His wife notes that he had fallen a few days prior, almost a week where he slipped off a ladder  He did not seek ER attention at that time  Patient is a laceration which is fairly well approximated over the top of the head to the superior portion of the forehead  Patient has no Reyes's raccoon's sign noted  Patient's CT scan was reviewed which shows 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

## 2022-04-15 ENCOUNTER — RA CDI HCC (OUTPATIENT)
Dept: OTHER | Facility: HOSPITAL | Age: 82
End: 2022-04-15

## 2022-04-15 NOTE — PROGRESS NOTES
[address]  coding opportunities       Chart reviewed, no opportunity found: CHART REVIEWED, NO OPPORTUNITY FOUND        Patients Insurance     Medicare Insurance: Medicare

## 2022-04-19 ENCOUNTER — HOSPITAL ENCOUNTER (OUTPATIENT)
Dept: CT IMAGING | Facility: HOSPITAL | Age: 82
Discharge: HOME | End: 2022-04-19
Payer: MEDICARE

## 2022-04-19 ENCOUNTER — HOSPITAL ENCOUNTER (OUTPATIENT)
Dept: RADIOLOGY | Facility: HOSPITAL | Age: 82
Discharge: HOME | End: 2022-04-19
Payer: MEDICARE

## 2022-04-19 DIAGNOSIS — M25.362 KNEE GIVES OUT, LEFT: ICD-10-CM

## 2022-04-19 DIAGNOSIS — G96.08 SUBDURAL HYGROMA: ICD-10-CM

## 2022-04-19 PROCEDURE — G1004 CDSM NDSC: HCPCS

## 2022-04-19 PROCEDURE — 73564 X-RAY EXAM KNEE 4 OR MORE: CPT

## 2022-04-19 PROCEDURE — 70450 CT HEAD/BRAIN W/O DYE: CPT

## 2022-04-21 ENCOUNTER — OFFICE VISIT (OUTPATIENT)
Dept: FAMILY MEDICINE CLINIC | Facility: CLINIC | Age: 82
End: 2022-04-21
Payer: MEDICARE

## 2022-04-21 VITALS
WEIGHT: 180 LBS | HEART RATE: 85 BPM | OXYGEN SATURATION: 96 % | HEIGHT: 71 IN | SYSTOLIC BLOOD PRESSURE: 122 MMHG | TEMPERATURE: 97.1 F | BODY MASS INDEX: 25.2 KG/M2 | DIASTOLIC BLOOD PRESSURE: 64 MMHG

## 2022-04-21 DIAGNOSIS — S01.01XD LACERATION OF SCALP, SUBSEQUENT ENCOUNTER: Primary | ICD-10-CM

## 2022-04-21 DIAGNOSIS — E66.3 OVERWEIGHT (BMI 25.0-29.9): ICD-10-CM

## 2022-04-21 DIAGNOSIS — M17.12 PRIMARY OSTEOARTHRITIS OF LEFT KNEE: ICD-10-CM

## 2022-04-21 PROCEDURE — 99214 OFFICE O/P EST MOD 30 MIN: CPT | Performed by: FAMILY MEDICINE

## 2022-04-21 PROCEDURE — 20610 DRAIN/INJ JOINT/BURSA W/O US: CPT | Performed by: FAMILY MEDICINE

## 2022-04-21 RX ORDER — BUPIVACAINE HYDROCHLORIDE 5 MG/ML
2 INJECTION, SOLUTION EPIDURAL; INTRACAUDAL
Status: COMPLETED | OUTPATIENT
Start: 2022-04-21 | End: 2022-04-21

## 2022-04-21 RX ORDER — BUPIVACAINE HYDROCHLORIDE 5 MG/ML
2 INJECTION, SOLUTION PERINEURAL ONCE
Status: COMPLETED | OUTPATIENT
Start: 2022-04-21 | End: 2022-04-21

## 2022-04-21 RX ORDER — METHYLPREDNISOLONE ACETATE 80 MG/ML
80 INJECTION, SUSPENSION INTRA-ARTICULAR; INTRALESIONAL; INTRAMUSCULAR; SOFT TISSUE ONCE
Status: COMPLETED | OUTPATIENT
Start: 2022-04-21 | End: 2022-04-21

## 2022-04-21 RX ORDER — METHYLPREDNISOLONE ACETATE 80 MG/ML
1 INJECTION, SUSPENSION INTRA-ARTICULAR; INTRALESIONAL; INTRAMUSCULAR; SOFT TISSUE
Status: COMPLETED | OUTPATIENT
Start: 2022-04-21 | End: 2022-04-21

## 2022-04-21 RX ADMIN — METHYLPREDNISOLONE ACETATE 80 MG: 80 INJECTION, SUSPENSION INTRA-ARTICULAR; INTRALESIONAL; INTRAMUSCULAR; SOFT TISSUE at 13:29

## 2022-04-21 RX ADMIN — BUPIVACAINE HYDROCHLORIDE 2 ML: 5 INJECTION, SOLUTION PERINEURAL at 13:30

## 2022-04-21 RX ADMIN — BUPIVACAINE HYDROCHLORIDE 2 ML: 5 INJECTION, SOLUTION EPIDURAL; INTRACAUDAL at 14:33

## 2022-04-21 RX ADMIN — METHYLPREDNISOLONE ACETATE 1 ML: 80 INJECTION, SUSPENSION INTRA-ARTICULAR; INTRALESIONAL; INTRAMUSCULAR; SOFT TISSUE at 14:48

## 2022-04-21 NOTE — PROGRESS NOTES
Assessment/Plan:    No problem-specific Assessment & Plan notes found for this encounter  Diagnoses and all orders for this visit:    Laceration of scalp, subsequent encounter  Comments:  4 staples removed without difficulty, wound site healing well    Primary osteoarthritis of left knee  -     bupivacaine (MARCAINE) 0 5 % injection 2 mL  -     methylPREDNISolone acetate (DEPO-MEDROL) injection 80 mg    Overweight (BMI 25 0-29  9)          PHQ-2/9 Depression Screening          BMI Counseling: Body mass index is 25 1 kg/m²  The BMI is above normal  Nutrition recommendations include reducing portion sizes, decreasing overall calorie intake, 3-5 servings of fruits/vegetables daily, reducing fast food intake, consuming healthier snacks, decreasing soda and/or juice intake and moderation in carbohydrate intake  Exercise recommendations include vigorous aerobic physical activity for 75 minutes/week and exercising 3-5 times per week  Subjective:      Patient ID: Nilsa Payton is a 79 y o  male  Patient presents for follow up to remove jaz and review L  Knee x-ray which reveals moderate tri-compartmental OA  I reviewed the repeat CT scan which shows reduction of hygroma on the right and stable appearance on the left  He has a pending appt with neurosurgery  The following portions of the patient's history were reviewed and updated as appropriate: allergies, current medications, past family history, past medical history, past social history, past surgical history and problem list     Review of Systems   Respiratory: Negative for shortness of breath  Cardiovascular: Negative for chest pain  Musculoskeletal: Positive for arthralgias  Neurological: Negative for dizziness, seizures, syncope, light-headedness, numbness and headaches           Objective:    /64 (BP Location: Left arm, Patient Position: Sitting, Cuff Size: Large)   Pulse 85   Temp (!) 97 1 °F (36 2 °C) (Tympanic)   Ht 5' 11" (1 803 m)   Wt 81 6 kg (180 lb)   SpO2 96%   BMI 25 10 kg/m²      Physical Exam  Constitutional:       General: He is not in acute distress  Appearance: Normal appearance  He is not ill-appearing  HENT:      Head: Normocephalic and atraumatic  Eyes:      Pupils: Pupils are equal, round, and reactive to light  Musculoskeletal:         General: No swelling, tenderness or deformity  Skin:     General: Skin is warm and dry  Comments: Scabbed healed laceration over vertex of scalp, 4 staples removed without difficulty   Neurological:      Mental Status: He is alert  Psychiatric:         Mood and Affect: Mood normal          Behavior: Behavior normal          Thought Content:  Thought content normal          Judgment: Judgment normal

## 2022-04-21 NOTE — PROGRESS NOTES
Large joint arthrocentesis: L knee  Universal Protocol:  Consent: Verbal consent obtained  Written consent not obtained  Consent given by: patient  Timeout called at: 4/21/2022 2:31 PM   Patient understanding: patient states understanding of the procedure being performed  Patient identity confirmed: verbally with patient    Supporting Documentation  Indications: joint instability     Procedure Details  Location: knee - L knee  Needle size: 25 G  Ultrasound guidance: no  Approach: lateral  Medications administered: 2 mL bupivacaine (PF) 0 5 %; 1 mL methylPREDNISolone acetate 80 mg/mL    Patient tolerance: patient tolerated the procedure well with no immediate complications

## 2022-04-26 PROBLEM — S06.5XAA SDH (SUBDURAL HEMATOMA): Status: ACTIVE | Noted: 2022-04-26

## 2022-04-26 PROBLEM — S06.5X9A SDH (SUBDURAL HEMATOMA) (HCC): Status: ACTIVE | Noted: 2022-04-26

## 2022-04-26 NOTE — ASSESSMENT & PLAN NOTE
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  Correlate for history of fall  Alternative pathology would include decreased CSF volume, possibly due to spontaneous intracranial hypotension  No hemorrhage  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  Plan:   Exam:  GCS 15, very hard of hearing (95% deaf), CULP, no focal deficits   Imaging reviewed with patient, wife and daughter  Demonstrates stable left sided collection, most likely hygroma rather than hematoma given appearance  However he now has right sided collection as well concerning for hygroma  These may have occurred in the setting of trauma vs cerebral atrophy  Given new finding, would like to have ongoing monitoring     Do not feel intervention is warranted at this time   Patient is to return to the office in 2 weeks with repeat CT head wo contrast with AP or sooner should patient develop worsening symptoms or red flag signs  o This visit can be virtual / telephone given patient lives far away

## 2022-04-27 ENCOUNTER — OFFICE VISIT (OUTPATIENT)
Dept: NEUROSURGERY | Facility: CLINIC | Age: 82
End: 2022-04-27
Payer: MEDICARE

## 2022-04-27 VITALS
HEIGHT: 71 IN | BODY MASS INDEX: 25.2 KG/M2 | TEMPERATURE: 98.7 F | WEIGHT: 180 LBS | HEART RATE: 76 BPM | DIASTOLIC BLOOD PRESSURE: 82 MMHG | RESPIRATION RATE: 16 BRPM | SYSTOLIC BLOOD PRESSURE: 138 MMHG

## 2022-04-27 DIAGNOSIS — D18.1 HYGROMA: Primary | ICD-10-CM

## 2022-04-27 PROCEDURE — 99205 OFFICE O/P NEW HI 60 MIN: CPT | Performed by: PHYSICIAN ASSISTANT

## 2022-04-27 NOTE — PROGRESS NOTES
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  Correlate for history of fall  Alternative pathology would include decreased CSF volume, possibly due to spontaneous intracranial hypotension  No hemorrhage  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  Plan:   Exam:  GCS 15, very hard of hearing (95% deaf), CULP, no focal deficits   Imaging reviewed with patient, wife and daughter  Demonstrates stable left sided collection, most likely hygroma rather than hematoma given appearance  However he now has right sided collection as well concerning for hygroma  These may have occurred in the setting of trauma vs cerebral atrophy  Given new finding, would like to have ongoing monitoring     Do not feel intervention is warranted at this time   Patient is to return to the office in 2 weeks with repeat CT head wo contrast with AP or sooner should patient develop worsening symptoms or red flag signs  o This visit can be virtual / telephone given patient lives far away         Diagnoses and all orders for this visit:    Hygroma  -     CT head wo contrast; Future            CHIEF COMPLAINT    Chief Complaint   Patient presents with    New Patient Visit     2 week hospital f/u       HISTORY    History of Present Illness     79y o  year old male with past medical history significant for hyperlipidemia, arthritis, significant hearing impairment who presents as a new consultation/2 week hospital follow-up status post fall x2 with hygroma  Tele consult completed on 04/12/2022 as patient was admitted to [address] [place] [address]  Patient is here with his wife and daughter  Due to being extremely hard of hearing, history is obtained solely from the wife  She states patient had 2 falls prior to their hospitalization  He had 1 fall off a ladder and then 6 days later his left knee gave out and he fell down the stairs  She did not take him to the hospital after the 1st fall but states after the 2nd fall she took him to the hospital as he needed stitches  While hospitalized patient was noted to have left-sided hygroma without acute hematoma  Since being discharged patient is back to his baseline  He continues to be extremely hard of hearing and though he is able to speak his speech is slightly muffled likely from being hard of hearing  He has not had any additional falls  Does not take any anticoagulation/antiplatelet therapy  She denies all other complaints  Patient has hearing aids at home but she states they do not help  He is not currently wearing them  See Discussion    REVIEW OF SYSTEMS    Review of Systems   HENT: Positive for hearing loss  Musculoskeletal: Positive for gait problem (needs to podiatry for b/l feet problem )  Neurological: Positive for speech difficulty (due to hearing loss)  All other systems reviewed and are negative  Meds/Allergies     Current Outpatient Medications   Medication Sig Dispense Refill    co-enzyme Q-10 30 MG capsule Take 30 mg by mouth 3 (three) times a day      Multiple Vitamin (MULTIVITAMIN) tablet Take by mouth daily       Multiple Vitamins-Minerals (PRESERVISION AREDS 2 PO) Take by mouth      simvastatin (ZOCOR) 20 mg tablet Take 20 mg by mouth daily at bedtime       No current facility-administered medications for this visit         No Known Allergies    PAST HISTORY    Past Medical History:   Diagnosis Date    Arthritis     Encounter for general adult medical examination without abnormal findings 3/20/2019    Hyperlipidemia        Past Surgical History:   Procedure Laterality Date    HERNIA REPAIR Right     inguinal    CT REVISE MEDIAN N/CARPAL TUNNEL SURG Right 9/20/2021    Procedure: RELEASE CARPAL TUNNEL;  Surgeon: Kirsten Paniagua MD;  Location: [facility];  Service: Orthopedics       Social History     Tobacco Use    Smoking status: Former Smoker    Smokeless tobacco: Never Used    Tobacco comment: Smoked as a teenager   Vaping Use    Vaping Use: Never used   Substance Use Topics    Alcohol use: Yes     Comment: Rare    Drug use: No       No family history on file  Above history personally reviewed  EXAM    Vitals:Blood pressure 138/82, pulse 76, temperature 98 7 °F (37 1 °C), temperature source Temporal, resp  rate 16, height 5' 11" (1 803 m), weight 81 6 kg (180 lb)  ,Body mass index is 25 1 kg/m²  Physical Exam  Constitutional:       General: He is awake  Appearance: Normal appearance  HENT:      Head: Normocephalic and atraumatic  Right Ear: Decreased hearing noted  Left Ear: Decreased hearing noted  Ears:      Comments: Patient is extremely hard of hearing, no hearing aids in, wife states 95% hard of hearing  Eyes:      Conjunctiva/sclera: Conjunctivae normal    Cardiovascular:      Rate and Rhythm: Normal rate  Pulmonary:      Effort: Pulmonary effort is normal  No respiratory distress  Skin:     General: Skin is warm and dry  Neurological:      Mental Status: He is alert and oriented to person, place, and time  Coordination: Finger-Nose-Finger Test normal       Gait: Gait is intact  Psychiatric:         Attention and Perception: Attention and perception normal          Mood and Affect: Mood and affect normal          Behavior: Behavior normal  Behavior is cooperative  Thought Content:  Thought content normal          Cognition and Memory: Cognition and memory normal          Judgment: Judgment normal       Comments: Speech slightly muffled however feel this is likely due to his poor hearing         Neurologic Exam     Mental Status   Oriented to person, place, and time  Follows 1 step commands  Attention: normal  Concentration: normal    Level of consciousness: alert  Knowledge: good  Normal comprehension  Have to write commands down to have patient follow given he is so hard of hearing, wife does most of the talking for him and aids in communication     Cranial Nerves     CN III, IV, VI   Conjugate gaze: present    CN VII   Right facial weakness: none  Left facial weakness: none    CN VIII   Hearing: impaired    Motor Exam   Muscle bulk: normal  Overall muscle tone: normal  Right arm pronator drift: absent  Left arm pronator drift: absent  No focal weakness appreciated, strength appears intact     Gait, Coordination, and Reflexes     Gait  Gait: normal    Coordination   Finger to nose coordination: normal    Tremor   Resting tremor: absent  Intention tremor: absent  Action tremor: absent    Reflexes   Right : 2+  Left : 2+        MEDICAL DECISION MAKING    Imaging Studies:     XR knee 4+ vw left injury    Result Date: 4/21/2022  Narrative: LEFT KNEE INDICATION:   M25 362: Other instability, left knee  COMPARISON:  None VIEWS:  XR KNEE 4+ VW LEFT INJURY Images: 5 FINDINGS: There is no acute fracture or dislocation  There is no joint effusion  Moderate tricompartmental degenerative osteoarthritis most pronounced within the medial and patellofemoral compartment where there is joint space narrowing and mild osteophyte formation  No lytic or blastic osseous lesion  Soft tissues are unremarkable  Impression: No acute osseous abnormality  Degenerative changes as described   Workstation performed: [id number]     CT head wo contrast    Result Date: 4/21/2022  Narrative: CT BRAIN - WITHOUT CONTRAST INDICATION:   G96 08: Other cranial cerebrospinal fluid leak  COMPARISON:  4/12/2022, CT  MRI dated 7/16/2020  TECHNIQUE:  CT examination of the brain was performed  In addition to axial images, sagittal and coronal 2D reformatted images were created and submitted for interpretation  Radiation dose length product (DLP) for this visit:  854 5 mGy-cm   This examination, like all CT scans performed in the [facility], was performed utilizing techniques to minimize radiation dose exposure, including the use of iterative reconstruction and automated exposure control  IMAGE QUALITY:  Diagnostic  FINDINGS: PARENCHYMA:  Once again identified is a CSF density collection within the subdural space of the left hemisphere largest towards the frontal/parietal vertex  This is grossly unchanged in size and configuration from the prior examination measuring approximately 1 cm on image 53 of coronal reconstructions  Interval development of a similar-appearing CSF density collection superficial to the right hemisphere within the subdural space, slightly smaller than the opposite left sided collection  This right-sided collection is new compared to the prior examination  No intraparenchymal hemorrhage  Stable mild white matter change within the cerebral hemispheres consistent with chronic microangiopathy  Normal basilar cisterns  Brainstem and cerebellum demonstrate normal density  No signs of acute ischemia  No inferior displacement of the cerebellar tonsils or other signs of intracranial hypotension  VENTRICLES:  There is no obstructive hydrocephalus  Ventricles are unchanged in size and configuration  VISUALIZED ORBITS AND PARANASAL SINUSES:  Unremarkable  CALVARIUM AND EXTRACRANIAL SOFT TISSUES:  Normal      Impression: Stable appearance of a left-sided subdural collection which is of CSF density, most consistent with hygroma when compared with prior CT dated 4/12/2022   Interval development of a slightly smaller right hemispheric subdural hygroma  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  Correlate for history of fall  Alternative pathology would include decreased CSF volume, possibly due to spontaneous intracranial hypotension  No hemorrhage  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  This examination was marked "immediate notification" in Epic in order to begin the standard process by which the radiology reading room liaison alerts the referring practitioner  Workstation performed: [id number]     CT head without contrast    Addendum Date: 4/12/2022 Addendum:   ADDENDUM:  I personally discussed this study with Jovanny Aaron on 4/12/2022 at 11:27 AM      Result Date: 4/12/2022  Narrative: CT BRAIN - WITHOUT CONTRAST INDICATION:   Head trauma, minor (Age >= 65y) head trauma in the elderly  COMPARISON:  MRI brain with and without contrast July 16, 2020  TECHNIQUE:  CT examination of the brain was performed  In addition to axial images, sagittal and coronal 2D reformatted images were created and submitted for interpretation  Radiation dose length product (DLP) for this visit:  901 3 mGy-cm   This examination, like all CT scans performed in the [facility], was performed utilizing techniques to minimize radiation dose exposure, including the use of iterative reconstruction and automated exposure control  IMAGE QUALITY:  Diagnostic  FINDINGS: PARENCHYMA AND EXTRA-AXIAL SPACES: 1 0 cm thick hypodense subdural fluid collection along left frontoparietal cerebral convexity (2:36), new since 7/16/2020  0 4 cm rightward midline shift (2:28), previously 0 2 cm  Decreased attenuation is noted in periventricular and subcortical white matter demonstrating an appearance that is statistically most likely to represent mild microangiopathic change  No CT signs of acute infarction  No intracranial mass  No acute parenchymal hemorrhage   VENTRICLES:  Normal for the patient's age  VISUALIZED ORBITS AND PARANASAL SINUSES:  Orbits are normal   Minimal mucosal thickening in left maxillary sinus  CALVARIUM AND EXTRACRANIAL SOFT TISSUES:  No acute calvarial fracture  New small scalp hematoma in right frontal vertex region  Impression: New 1 0 cm thick hypodense subdural fluid collection along left frontoparietal cerebral convexity, likely hygroma  Differential includes chronic subdural hematoma  0 4 cm rightward midline shift, previously 0 2 cm  Attention on follow-up  New small scalp hematoma in right frontal vertex region  The study was marked in [facility] for immediate notification  Workstation performed: [id number]       I have personally reviewed pertinent reports     and I have personally reviewed pertinent films in PACS

## 2022-05-03 ENCOUNTER — OFFICE VISIT (OUTPATIENT)
Dept: PODIATRY | Facility: CLINIC | Age: 82
End: 2022-05-03
Payer: MEDICARE

## 2022-05-03 VITALS
DIASTOLIC BLOOD PRESSURE: 76 MMHG | HEIGHT: 71 IN | HEART RATE: 93 BPM | WEIGHT: 175.4 LBS | BODY MASS INDEX: 24.56 KG/M2 | SYSTOLIC BLOOD PRESSURE: 122 MMHG | TEMPERATURE: 98.7 F

## 2022-05-03 DIAGNOSIS — B35.1 ONYCHOMYCOSIS: ICD-10-CM

## 2022-05-03 DIAGNOSIS — M79.674 PAIN IN TOES OF BOTH FEET: Primary | ICD-10-CM

## 2022-05-03 DIAGNOSIS — M79.675 PAIN IN TOES OF BOTH FEET: Primary | ICD-10-CM

## 2022-05-03 DIAGNOSIS — I73.9 PERIPHERAL VASCULAR DISEASE, UNSPECIFIED (HCC): ICD-10-CM

## 2022-05-03 PROCEDURE — 99203 OFFICE O/P NEW LOW 30 MIN: CPT | Performed by: PODIATRIST

## 2022-05-03 PROCEDURE — 11721 DEBRIDE NAIL 6 OR MORE: CPT | Performed by: PODIATRIST

## 2022-05-03 NOTE — PATIENT INSTRUCTIONS
Nail Fungus   AMBULATORY CARE:   Nail fungus , or onychomycosis, is a fungal infection in your toenail or fingernail  Nail fungus is more common in toenails than fingernails  The cause of the infection may not be known  Common symptoms include the following:   · Nails that curl up or down or are misshapen    · Discolored (often white, yellow, or brown) nails    · Fragile or cracked nails    · Thick nails or nails with rough, jagged edges    · Nail that is  from the nail bed    · Tenderness or pain in the affected nail    Contact your healthcare provider if:  You have questions or concerns about your condition or care  Treatment for nail fungus  may include antifungal medicine and topical treatments  Antifungal medicine is a pill that treats a fungal infection  You may need to take this medicine for up to 12 weeks  Topical treatments include creams and polishes that you apply to the top of your nail  You may need to use topical treatments for up to 1 year before you see positive results  Ask your healthcare provider for more information about antifungal medicine  Manage your symptoms:   · Use antifungal sprays or powders  You can buy these at your local drugstore  · Keep your nails short  and file down any thick areas  Use separate nail trimmers and files for infected nails and healthy nails  If you go to a salon to get your nails done, bring your own nail files and trimmers  Prevent nail fungus:   · Dry your feet with a towel  or hair dryer after you bathe  · Do not wear tight-fitting shoes  or shoes that pinch your toes  Avoid shoes made from rubber or plastic  · Wear socks that absorb moisture  This includes socks make of wool, nylon, or polypropylene  Do not wear cotton socks  Change your socks if they are damp from sweat or your feet get wet  Put on dry, clean socks every day  · Do not go barefoot  in locker rooms or public showers      · Do not use nail polish  or artificial nails such as acrylic or gel nails  · Wear gloves that are waterproof  if you work with water  Follow up with your doctor as directed:  Write down your questions so you remember to ask them during your visits  © Copyright [employer] 2022 Information is for End User's use only and may not be sold, redistributed or otherwise used for commercial purposes  All illustrations and images included in CareNotes® are the copyrighted property of [employer]  or Masoud Mathur  The above information is an  only  It is not intended as medical advice for individual conditions or treatments  Talk to your doctor, nurse or pharmacist before following any medical regimen to see if it is safe and effective for you

## 2022-05-03 NOTE — PROGRESS NOTES
HISTORY AND PHYSICAL EXAM  - [facility]    PATIENT:  Toma Arias    1940      Assessment/Plan     Problem List Items Addressed This Visit     None      Visit Diagnoses     Pain in toes of both feet    -  Primary    Onychomycosis        Peripheral vascular disease, unspecified ([facility] )               Diagnoses and all orders for this visit:    Pain in toes of both feet    Onychomycosis    Peripheral vascular disease, unspecified ([facility] )     - Q 8 findings for nail care, [address]   -he is unable to bend down and touch his toenails, had previous carpal tunnel surgery his  strength is decreased   -advised daily pedal checks, he is to return to clinic and 9 weeks for continued care      Procedure: All mycotic toenails were reduced and debrided in length, width, and girth using a nail nipper and dremel  Patient tolerated procedure(s) well without complications  History of Present Illness   Toma Arias is a 79 y o  male who presents with elongated painful toenails he is unable to bend down and cut, he is unable to play topical medication has difficulty tying his shoes  He is hard of hearing notes that he cannot cut his toenails due to the thickness incurvation and pain  Review of Systems   Constitutional: Negative for chills and fever  HENT: Negative for ear pain and sore throat  Eyes: Negative for pain and visual disturbance  Respiratory: Negative for cough and shortness of breath  Cardiovascular: Negative for chest pain and palpitations  Gastrointestinal: Negative for abdominal pain and vomiting  Genitourinary: Negative for dysuria and hematuria  Musculoskeletal: Negative for arthralgias and back pain  Skin: Negative for color change and rash  Neurological: Negative for seizures and syncope  All other systems reviewed and are negative        Historical Information   Past Medical History:   Diagnosis Date    Arthritis     Encounter for general adult medical examination without abnormal findings 3/20/2019    Hyperlipidemia      Past Surgical History:   Procedure Laterality Date    HERNIA REPAIR Right     inguinal    [place] REVISE MEDIAN N/CARPAL TUNNEL SURG Right 9/20/2021    Procedure: RELEASE CARPAL TUNNEL;  Surgeon: Kacy Rodriguez MD;  Location: [facility];  Service: Orthopedics     Social History   Social History     Substance and Sexual Activity   Alcohol Use Yes    Comment: Rare     Social History     Substance and Sexual Activity   Drug Use No     Social History     Tobacco Use   Smoking Status Former Smoker   Smokeless Tobacco Never Used   Tobacco Comment    Smoked as a teenager     Family History: non-contributory    Meds/Allergies   all medications and allergies reviewed  No Known Allergies    Objective   Vitals: Blood pressure 122/76, pulse 93, temperature 98 7 °F (37 1 °C), height 5' 11" (1 803 m), weight 79 6 kg (175 lb 6 4 oz)  ,Body mass index is 24 46 kg/m²  Physical Exam  Vitals reviewed  Constitutional:       Appearance: Normal appearance  He is normal weight  HENT:      Head: Normocephalic and atraumatic  Nose: Nose normal       Mouth/Throat:      Mouth: Mucous membranes are moist    Eyes:      Pupils: Pupils are equal, round, and reactive to light  Pulmonary:      Effort: Pulmonary effort is normal    Musculoskeletal:         General: Tenderness present  Comments: Skin is shiny and atrophic, there is significant pain to palpation of all 10 nail plates with nails, nails are thickened elongated with notable subungual debris times 10 pulses are decreased bilaterally with DP being +1/4 and PT being nonpalpable  Skin:     Capillary Refill: Capillary refill takes 2 to 3 seconds  Neurological:      General: No focal deficit present  Mental Status: He is alert and oriented to person, place, and time     Psychiatric:         Mood and Affect: Mood normal          Behavior: Behavior normal          Ortho Exam

## 2022-05-05 ENCOUNTER — APPOINTMENT (OUTPATIENT)
Dept: LAB | Facility: CLINIC | Age: 82
End: 2022-05-05
Payer: MEDICARE

## 2022-05-05 DIAGNOSIS — R03.0 BORDERLINE HYPERTENSION: ICD-10-CM

## 2022-05-05 DIAGNOSIS — E78.00 HYPERCHOLESTEROLEMIA: ICD-10-CM

## 2022-05-05 LAB
ALBUMIN SERPL BCP-MCNC: 3.9 G/DL (ref 3.5–5)
ALP SERPL-CCNC: 76 U/L (ref 46–116)
ALT SERPL W P-5'-P-CCNC: 31 U/L (ref 12–78)
ANION GAP SERPL CALCULATED.3IONS-SCNC: 5 MMOL/L (ref 4–13)
AST SERPL W P-5'-P-CCNC: 19 U/L (ref 5–45)
BILIRUB SERPL-MCNC: 0.64 MG/DL (ref 0.2–1)
BUN SERPL-MCNC: 16 MG/DL (ref 5–25)
CALCIUM SERPL-MCNC: 9.4 MG/DL (ref 8.3–10.1)
CHLORIDE SERPL-SCNC: 107 MMOL/L (ref 100–108)
CHOLEST SERPL-MCNC: 176 MG/DL
CO2 SERPL-SCNC: 26 MMOL/L (ref 21–32)
CREAT SERPL-MCNC: 0.83 MG/DL (ref 0.6–1.3)
GFR SERPL CREATININE-BSD FRML MDRD: 81 ML/MIN/1.73SQ M
GLUCOSE P FAST SERPL-MCNC: 90 MG/DL (ref 65–99)
HDLC SERPL-MCNC: 68 MG/DL
LDLC SERPL CALC-MCNC: 100 MG/DL (ref 0–100)
NONHDLC SERPL-MCNC: 108 MG/DL
POTASSIUM SERPL-SCNC: 4.6 MMOL/L (ref 3.5–5.3)
PROT SERPL-MCNC: 7.4 G/DL (ref 6.4–8.2)
SODIUM SERPL-SCNC: 138 MMOL/L (ref 136–145)
TRIGL SERPL-MCNC: 41 MG/DL

## 2022-05-05 PROCEDURE — 36415 COLL VENOUS BLD VENIPUNCTURE: CPT

## 2022-05-05 PROCEDURE — 80053 COMPREHEN METABOLIC PANEL: CPT

## 2022-05-05 PROCEDURE — 80061 LIPID PANEL: CPT

## 2022-05-10 ENCOUNTER — HOSPITAL ENCOUNTER (OUTPATIENT)
Dept: CT IMAGING | Facility: HOSPITAL | Age: 82
Discharge: HOME | End: 2022-05-10
Payer: MEDICARE

## 2022-05-10 DIAGNOSIS — D18.1 HYGROMA: ICD-10-CM

## 2022-05-10 PROCEDURE — 70450 CT HEAD/BRAIN W/O DYE: CPT

## 2022-05-11 ENCOUNTER — TELEPHONE (OUTPATIENT)
Dept: NEUROSURGERY | Facility: CLINIC | Age: 82
End: 2022-05-11

## 2022-05-11 NOTE — TELEPHONE ENCOUNTER
Since the patient has not returned the call yet, this RN reached out to his daughter, Morgan Vitale, who is listed on the medical communication consent form  She reports the patient and his wife are at the dentist today and how they will be going out to eat at a restaurant after their appointments  She reports the patient is doing fairly well  Denies any change in his status  She reports she last saw him on Sunday for Mother's Day  Adrienne Ramirez how this RN called him earlier today to see how he is doing due to CT head results and to potentially move up the appointment  Currently holding an appointment spot on 5/13/22 at 1 pm with an AP  Morgan Vitale will call her mother to direct her to call the office  Provided the office's phone number  She was appreciative for the call

## 2022-05-11 NOTE — TELEPHONE ENCOUNTER
Received a significant findings call from radiology regarding the patient's CT head  Patient is scheduled for an appointment with the office on 5/25/22  Discussed with Lexie Melgar PA-C  She requested for this RN to reach out to the patient to see how he is doing  If no new neurological decline, then the patient can follow up on 5/25/22  If he is experiencing new neurologic decline, then patient is to be seen sooner in the office  Called patient to see how he is doing and potentially to move up the follow up visit  Patient did not answer  Left a detailed vm requesting for a call back  Provided the office's phone number

## 2022-05-11 NOTE — TELEPHONE ENCOUNTER
Received a call from [facility] reporting the patient is doing well with no change in his status  Denies any new neurologic changes/decline  Rescheduled appointment to 5/13/22 at 1 pm since there is availability and they are anxious to hear more about the results  She was appreciative

## 2022-05-13 ENCOUNTER — TELEMEDICINE (OUTPATIENT)
Dept: NEUROSURGERY | Facility: CLINIC | Age: 82
End: 2022-05-13
Payer: MEDICARE

## 2022-05-13 DIAGNOSIS — D18.1 HYGROMA: Primary | ICD-10-CM

## 2022-05-13 PROCEDURE — 99442 PR PHYS/QHP TELEPHONE EVALUATION 11-20 MIN: CPT | Performed by: PHYSICIAN ASSISTANT

## 2022-05-13 NOTE — PROGRESS NOTES
Virtual Regular Visit    Verification of patient location:    Patient is located in the following state in which I hold an active license [place]    It was my intent to perform this visit via video technology but the patient was not able to do a video connection so the visit was completed via audio telephone only  Assessment/Plan:    Problem List Items Addressed This Visit        Immune and Lymphatic    Hygroma - Primary     Patient presents for approximately 6 week post hospital follow-up  · S/p fall x 2 at home with left hygroma, prior imaging with new smaller right hygroma  · No AC / AP therapies  · Per report, extremely hard of hearing at baseline    Imaging:  CT head wo contrast 5/10/22: Increase size of left hemispheric low dense extra-axial collection most compatible with chronic subdural hygroma  Chronic subdural hematoma less likely  New 4 mm left to right shift is noted  Resolution of right hemispheric low dense collection  Plan:  [facility] demonstrates increase size of left sided collection with new midline shift, most likely hygroma rather than hematoma given appearance  This may have occurred in the setting of trauma vs cerebral atrophy  Given new finding, would like to have ongoing monitoring   No surgical intervention is warranted at this time   Patient to have close interval follow up in 2 weeks with repeat CT head wo contrast with AP or sooner should patient develop worsening symptoms or concerning neurological deficits  o This visit can be virtual / telephone given patient lives far away                        Reason for visit is   Chief Complaint   Patient presents with    Follow-up    Virtual Regular Visit        Encounter provider Braulio Buenrostro PA-C    Provider located at [address] [place] [address]  [address]  [place] [address]      Recent Visits  No visits were found meeting these conditions    Showing recent visits within past 7 days and meeting all other requirements  Today's Visits  Date Type Provider Dept   05/13/22 Telemedicine [facility]rost[facility]   Showing today's visits and meeting all other requirements  Future Appointments  No visits were found meeting these conditions  Showing future appointments within next 150 days and meeting all other requirements       The patient was identified by name and date of birth  Seb Banks was informed that this is a telemedicine visit and that the visit is being conducted through Telephone  My office door was closed  No one else was in the room  He acknowledged consent and understanding of privacy and security of the video platform  The patient has agreed to participate and understands they can discontinue the visit at any time  Patient is aware this is a billable service  Subjective  Seb Banks is a 79 y o  male       Patient had a virtual visit today for approximately 6 week post hospital follow-up s/p fall x 2 at home with left hygroma, prior imaging with new smaller right hygroma  Pt was not on any AC / AP therapies  Per report, patient extremely hard of hearing at baseline, wife help him with what is said  Patient was accompanied by his wife during this virtual visit  Pt denied having any headache, dizziness, lightheadedness, nausea or vomiting  He denied any new numbness, tingling or weakness  He denied any changes in his gait  Per pt's wife, patient continues to be active and his usual self  She reports that patient was outdoors whacking weeds this morning and yesterday he was out mowing the grass for about 3 hours            Past Medical History:   Diagnosis Date    Arthritis     Encounter for general adult medical examination without abnormal findings 3/20/2019    Hyperlipidemia        Past Surgical History:   Procedure Laterality Date    HERNIA REPAIR Right     inguinal    [place] REVISE MEDIAN N/CARPAL TUNNEL SURG Right 9/20/2021 Procedure: RELEASE CARPAL TUNNEL;  Surgeon: Yoon Mesa MD;  Location: [facility];  Service: Orthopedics       Current Outpatient Medications   Medication Sig Dispense Refill    co-enzyme Q-10 30 MG capsule Take 30 mg by mouth in the morning   Multiple Vitamin (MULTIVITAMIN) tablet Take by mouth daily       Multiple Vitamins-Minerals (PRESERVISION AREDS 2 PO) Take by mouth      simvastatin (ZOCOR) 20 mg tablet Take 20 mg by mouth daily at bedtime       No current facility-administered medications for this visit  No Known Allergies    Review of Systems   HENT: Positive for hearing loss  Musculoskeletal: Positive for gait problem and joint swelling (knee-cortisone injection)  Neurological: Positive for speech difficulty (due to hearing loss)  All other systems reviewed and are negative  Video Exam    There were no vitals filed for this visit  Physical Exam  Constitutional:       General: He is not in acute distress  Neurological:      Mental Status: He is alert  Comments: Speech is clear and fluent  I spent 20 minutes directly with the patient during this visit    VIRTUAL VISIT [address] verbally agrees to participate in [employer]  Pt is aware that [employer] could be limited without vital signs or the ability to perform a full hands-on physical exam  Thomas Chase understands he or the provider may request at any time to terminate the video visit and request the patient to seek care or treatment in person

## 2022-05-13 NOTE — ASSESSMENT & PLAN NOTE
Patient presents for approximately 6 week post hospital follow-up  · S/p fall x 2 at home with left hygroma, prior imaging with new smaller right hygroma  · No AC / AP therapies  · Per report, extremely hard of hearing at baseline    Imaging:  CT head wo contrast 5/10/22: Increase size of left hemispheric low dense extra-axial collection most compatible with chronic subdural hygroma  Chronic subdural hematoma less likely  New 4 mm left to right shift is noted  Resolution of right hemispheric low dense collection  Plan:  [facility] demonstrates increase size of left sided collection with new midline shift, most likely hygroma rather than hematoma given appearance  This may have occurred in the setting of trauma vs cerebral atrophy  Given new finding, would like to have ongoing monitoring   No surgical intervention is warranted at this time   Patient to have close interval follow up in 2 weeks with repeat CT head wo contrast with AP or sooner should patient develop worsening symptoms or concerning neurological deficits    o This visit can be virtual / telephone given patient lives far away

## 2022-05-17 ENCOUNTER — OFFICE VISIT (OUTPATIENT)
Dept: FAMILY MEDICINE CLINIC | Facility: CLINIC | Age: 82
End: 2022-05-17
Payer: MEDICARE

## 2022-05-17 VITALS
DIASTOLIC BLOOD PRESSURE: 70 MMHG | OXYGEN SATURATION: 96 % | TEMPERATURE: 98.7 F | HEART RATE: 87 BPM | HEIGHT: 71 IN | BODY MASS INDEX: 24.5 KG/M2 | SYSTOLIC BLOOD PRESSURE: 120 MMHG | WEIGHT: 175 LBS

## 2022-05-17 DIAGNOSIS — M17.12 PRIMARY OSTEOARTHRITIS OF LEFT KNEE: ICD-10-CM

## 2022-05-17 DIAGNOSIS — E78.00 HYPERCHOLESTEROLEMIA: ICD-10-CM

## 2022-05-17 DIAGNOSIS — D18.1 HYGROMA: ICD-10-CM

## 2022-05-17 DIAGNOSIS — R03.0 BORDERLINE HYPERTENSION: Primary | ICD-10-CM

## 2022-05-17 PROCEDURE — 99214 OFFICE O/P EST MOD 30 MIN: CPT | Performed by: FAMILY MEDICINE

## 2022-05-17 RX ORDER — SIMVASTATIN 20 MG
20 TABLET ORAL
Qty: 90 TABLET | Refills: 2 | Status: SHIPPED | OUTPATIENT
Start: 2022-05-17

## 2022-05-17 NOTE — PROGRESS NOTES
Assessment/Plan:    No problem-specific Assessment & Plan notes found for this encounter  Diagnoses and all orders for this visit:    Borderline hypertension  Comments:  BP excellent  Orders:  -     CBC and differential; Future    Hypercholesterolemia  Comments:  Lipids well controlled  Orders:  -     simvastatin (ZOCOR) 20 mg tablet; Take 1 tablet (20 mg total) by mouth daily at bedtime  -     Comprehensive metabolic panel; Future  -     Lipid panel; Future  -     CBC and differential; Future    Primary osteoarthritis of left knee  Comments:  Improved after CSI last visit    Hygroma  Comments: Followed by Neurosurgery, had increase in size with 4mm L to R shift          PHQ-2/9 Depression Screening    Little interest or pleasure in doing things: 0 - not at all  Feeling down, depressed, or hopeless: 0 - not at all  PHQ-2 Score: 0  PHQ-2 Interpretation: Negative depression screen            Subjective:      Patient ID: Eugene Lucas is a 79 y o  male  Mc Alexiser presents for follow-up of his chronic conditions, I did review the office visits from Neurosurgery, he most recently had a telemedicine visit where his most recent CT scan showed an increase in size of the left hygroma causing a 4 mm left to right shift, there is no need for surgical intervention at this time and he will be closely monitored with serial CT scans  I reviewed his blood work with him, his total cholesterol is 176 improved from 02/02, triglycerides 41, HDL 68, , CMP is normal   Blood pressure per control continues to be excellent  Mc Olsen denies any neurologic symptoms, no falls no leading to 1 side or another, no balance or gait dysfunction  No numbness no tingling no cognitive deficits  Mc Olsen denies any neurologic symptoms, no falls no leading to 1 side or another, no balance or gait dysfunction  No numbness no tingling no cognitive deficits          The following portions of the patient's history were reviewed and updated as appropriate: allergies, current medications, past family history, past medical history, past social history, past surgical history and problem list     Review of Systems   Constitutional: Negative  Negative for chills, fatigue and fever  HENT: Negative  Eyes: Negative  Negative for visual disturbance  Respiratory: Negative for cough, chest tightness, shortness of breath and wheezing  Cardiovascular: Negative for chest pain, palpitations and leg swelling  Gastrointestinal: Negative for abdominal pain, blood in stool, constipation, diarrhea, nausea and vomiting  Endocrine: Negative  Genitourinary: Negative for difficulty urinating, dysuria, frequency, hematuria and urgency  Musculoskeletal: Negative for arthralgias and myalgias  Skin: Negative  Allergic/Immunologic: Negative  Neurological: Negative for seizures and syncope  Hematological: Negative for adenopathy  Psychiatric/Behavioral: Negative  Negative for dysphoric mood  The patient is not nervous/anxious  Objective:    /70 (BP Location: Left arm, Patient Position: Sitting)   Pulse 87   Temp 98 7 °F (37 1 °C) (Tympanic)   Ht 5' 11" (1 803 m)   Wt 79 4 kg (175 lb)   SpO2 96%   BMI 24 41 kg/m²      Physical Exam  Vitals and nursing note reviewed  Constitutional:       General: He is not in acute distress  Appearance: Normal appearance  He is not ill-appearing or diaphoretic  HENT:      Head: Normocephalic and atraumatic  Eyes:      Conjunctiva/sclera: Conjunctivae normal    Cardiovascular:      Rate and Rhythm: Normal rate and regular rhythm  Heart sounds: Normal heart sounds  No murmur heard  Pulmonary:      Effort: Pulmonary effort is normal  No respiratory distress  Breath sounds: Normal breath sounds  No wheezing, rhonchi or rales  Abdominal:      General: There is no distension  Palpations: Abdomen is soft  There is no mass  Tenderness: There is no abdominal tenderness  There is no guarding or rebound  Musculoskeletal:      Cervical back: Normal range of motion and neck supple  No rigidity  Right lower leg: No edema  Left lower leg: No edema  Lymphadenopathy:      Cervical: No cervical adenopathy  Neurological:      General: No focal deficit present  Mental Status: He is alert and oriented to person, place, and time  Cranial Nerves: No cranial nerve deficit  Sensory: No sensory deficit  Motor: No weakness  Coordination: Coordination normal       Gait: Gait normal    Psychiatric:         Mood and Affect: Mood normal          Behavior: Behavior normal          Thought Content:  Thought content normal          Judgment: Judgment normal

## 2022-05-27 ENCOUNTER — APPOINTMENT (EMERGENCY)
Dept: RADIOLOGY | Facility: HOSPITAL | Age: 82
DRG: 025 | End: 2022-05-27
Payer: MEDICARE

## 2022-05-27 ENCOUNTER — HOSPITAL ENCOUNTER (OUTPATIENT)
Dept: CT IMAGING | Facility: HOSPITAL | Age: 82
Discharge: HOME | End: 2022-05-27
Payer: MEDICARE

## 2022-05-27 ENCOUNTER — HOSPITAL ENCOUNTER (INPATIENT)
Facility: HOSPITAL | Age: 82
LOS: 10 days | DRG: 025 | End: 2022-06-06
Attending: EMERGENCY MEDICINE | Admitting: SURGERY
Payer: MEDICARE

## 2022-05-27 ENCOUNTER — TELEPHONE (OUTPATIENT)
Dept: OTHER | Facility: HOSPITAL | Age: 82
End: 2022-05-27

## 2022-05-27 ENCOUNTER — TRANSCRIBE ORDERS (OUTPATIENT)
Dept: NEUROSURGERY | Facility: CLINIC | Age: 82
End: 2022-05-27

## 2022-05-27 DIAGNOSIS — S06.5X9A SUBDURAL HEMATOMA (HCC): Primary | ICD-10-CM

## 2022-05-27 DIAGNOSIS — D18.1 HYGROMA: ICD-10-CM

## 2022-05-27 DIAGNOSIS — S06.5X9A SDH (SUBDURAL HEMATOMA) (HCC): Primary | ICD-10-CM

## 2022-05-27 DIAGNOSIS — S06.5X9A SDH (SUBDURAL HEMATOMA) (HCC): ICD-10-CM

## 2022-05-27 PROBLEM — S06.5XAA SDH (SUBDURAL HEMATOMA): Status: ACTIVE | Noted: 2022-05-27

## 2022-05-27 LAB
ABO GROUP BLD: NORMAL
ALBUMIN SERPL BCP-MCNC: 3.6 G/DL (ref 3.5–5)
ALP SERPL-CCNC: 79 U/L (ref 46–116)
ALT SERPL W P-5'-P-CCNC: 30 U/L (ref 12–78)
ANION GAP SERPL CALCULATED.3IONS-SCNC: 7 MMOL/L (ref 4–13)
APTT PPP: 30 SECONDS (ref 23–37)
AST SERPL W P-5'-P-CCNC: 18 U/L (ref 5–45)
BASOPHILS # BLD AUTO: 0.03 THOUSANDS/ΜL (ref 0–0.1)
BASOPHILS NFR BLD AUTO: 1 % (ref 0–1)
BILIRUB SERPL-MCNC: 0.52 MG/DL (ref 0.2–1)
BLD GP AB SCN SERPL QL: NEGATIVE
BUN SERPL-MCNC: 15 MG/DL (ref 5–25)
CALCIUM SERPL-MCNC: 9 MG/DL (ref 8.3–10.1)
CHLORIDE SERPL-SCNC: 107 MMOL/L (ref 100–108)
CO2 SERPL-SCNC: 25 MMOL/L (ref 21–32)
CREAT SERPL-MCNC: 0.78 MG/DL (ref 0.6–1.3)
EOSINOPHIL # BLD AUTO: 0.07 THOUSAND/ΜL (ref 0–0.61)
EOSINOPHIL NFR BLD AUTO: 1 % (ref 0–6)
ERYTHROCYTE [DISTWIDTH] IN BLOOD BY AUTOMATED COUNT: 13.1 % (ref 11.6–15.1)
GFR SERPL CREATININE-BSD FRML MDRD: 84 ML/MIN/1.73SQ M
GLUCOSE SERPL-MCNC: 94 MG/DL (ref 65–140)
HCT VFR BLD AUTO: 42.6 % (ref 36.5–49.3)
HGB BLD-MCNC: 14.2 G/DL (ref 12–17)
IMM GRANULOCYTES # BLD AUTO: 0.01 THOUSAND/UL (ref 0–0.2)
IMM GRANULOCYTES NFR BLD AUTO: 0 % (ref 0–2)
INR PPP: 1.06 (ref 0.84–1.19)
LYMPHOCYTES # BLD AUTO: 1.18 THOUSANDS/ΜL (ref 0.6–4.47)
LYMPHOCYTES NFR BLD AUTO: 21 % (ref 14–44)
MCH RBC QN AUTO: 32.3 PG (ref 26.8–34.3)
MCHC RBC AUTO-ENTMCNC: 33.3 G/DL (ref 31.4–37.4)
MCV RBC AUTO: 97 FL (ref 82–98)
MONOCYTES # BLD AUTO: 0.62 THOUSAND/ΜL (ref 0.17–1.22)
MONOCYTES NFR BLD AUTO: 11 % (ref 4–12)
NEUTROPHILS # BLD AUTO: 3.82 THOUSANDS/ΜL (ref 1.85–7.62)
NEUTS SEG NFR BLD AUTO: 66 % (ref 43–75)
NRBC BLD AUTO-RTO: 0 /100 WBCS
PLATELET # BLD AUTO: 380 THOUSANDS/UL (ref 149–390)
PMV BLD AUTO: 9.1 FL (ref 8.9–12.7)
POTASSIUM SERPL-SCNC: 4.2 MMOL/L (ref 3.5–5.3)
PROT SERPL-MCNC: 7.5 G/DL (ref 6.4–8.2)
PROTHROMBIN TIME: 13.3 SECONDS (ref 11.6–14.5)
RBC # BLD AUTO: 4.39 MILLION/UL (ref 3.88–5.62)
RH BLD: POSITIVE
SODIUM SERPL-SCNC: 139 MMOL/L (ref 136–145)
SPECIMEN EXPIRATION DATE: NORMAL
WBC # BLD AUTO: 5.73 THOUSAND/UL (ref 4.31–10.16)

## 2022-05-27 PROCEDURE — 99223 1ST HOSP IP/OBS HIGH 75: CPT | Performed by: SURGERY

## 2022-05-27 PROCEDURE — 86920 COMPATIBILITY TEST SPIN: CPT

## 2022-05-27 PROCEDURE — 85025 COMPLETE CBC W/AUTO DIFF WBC: CPT | Performed by: EMERGENCY MEDICINE

## 2022-05-27 PROCEDURE — 36415 COLL VENOUS BLD VENIPUNCTURE: CPT | Performed by: EMERGENCY MEDICINE

## 2022-05-27 PROCEDURE — 85730 THROMBOPLASTIN TIME PARTIAL: CPT | Performed by: EMERGENCY MEDICINE

## 2022-05-27 PROCEDURE — 99223 1ST HOSP IP/OBS HIGH 75: CPT | Performed by: NEUROLOGICAL SURGERY

## 2022-05-27 PROCEDURE — 93005 ELECTROCARDIOGRAM TRACING: CPT

## 2022-05-27 PROCEDURE — 86900 BLOOD TYPING SEROLOGIC ABO: CPT | Performed by: PHYSICIAN ASSISTANT

## 2022-05-27 PROCEDURE — 71045 X-RAY EXAM CHEST 1 VIEW: CPT

## 2022-05-27 PROCEDURE — 99285 EMERGENCY DEPT VISIT HI MDM: CPT | Performed by: EMERGENCY MEDICINE

## 2022-05-27 PROCEDURE — G1004 CDSM NDSC: HCPCS

## 2022-05-27 PROCEDURE — 70450 CT HEAD/BRAIN W/O DYE: CPT

## 2022-05-27 PROCEDURE — 86850 RBC ANTIBODY SCREEN: CPT | Performed by: PHYSICIAN ASSISTANT

## 2022-05-27 PROCEDURE — 86901 BLOOD TYPING SEROLOGIC RH(D): CPT | Performed by: PHYSICIAN ASSISTANT

## 2022-05-27 PROCEDURE — 99285 EMERGENCY DEPT VISIT HI MDM: CPT

## 2022-05-27 PROCEDURE — 80053 COMPREHEN METABOLIC PANEL: CPT | Performed by: EMERGENCY MEDICINE

## 2022-05-27 PROCEDURE — NC001 PR NO CHARGE: Performed by: SURGERY

## 2022-05-27 PROCEDURE — 85610 PROTHROMBIN TIME: CPT | Performed by: EMERGENCY MEDICINE

## 2022-05-27 RX ORDER — OXYCODONE HYDROCHLORIDE 5 MG/1
5 TABLET ORAL EVERY 4 HOURS PRN
Status: DISCONTINUED | OUTPATIENT
Start: 2022-05-27 | End: 2022-05-30

## 2022-05-27 RX ORDER — HYDROMORPHONE HCL/PF 1 MG/ML
0.5 SYRINGE (ML) INJECTION EVERY 4 HOURS PRN
Status: DISCONTINUED | OUTPATIENT
Start: 2022-05-27 | End: 2022-05-29

## 2022-05-27 RX ORDER — POLYETHYLENE GLYCOL 3350 17 G/17G
17 POWDER, FOR SOLUTION ORAL DAILY
Status: DISCONTINUED | OUTPATIENT
Start: 2022-05-28 | End: 2022-06-06 | Stop reason: HOSPADM

## 2022-05-27 RX ORDER — LEVETIRACETAM 500 MG/1
500 TABLET ORAL EVERY 12 HOURS SCHEDULED
Status: COMPLETED | OUTPATIENT
Start: 2022-05-27 | End: 2022-06-03

## 2022-05-27 RX ORDER — ACETAMINOPHEN 325 MG/1
650 TABLET ORAL EVERY 6 HOURS PRN
Status: DISCONTINUED | OUTPATIENT
Start: 2022-05-27 | End: 2022-06-06 | Stop reason: HOSPADM

## 2022-05-27 RX ORDER — ONDANSETRON 2 MG/ML
4 INJECTION INTRAMUSCULAR; INTRAVENOUS EVERY 6 HOURS PRN
Status: DISCONTINUED | OUTPATIENT
Start: 2022-05-27 | End: 2022-05-28 | Stop reason: SDUPTHER

## 2022-05-27 RX ORDER — OXYCODONE HYDROCHLORIDE 10 MG/1
10 TABLET ORAL EVERY 4 HOURS PRN
Status: DISCONTINUED | OUTPATIENT
Start: 2022-05-27 | End: 2022-05-30

## 2022-05-27 RX ORDER — SODIUM CHLORIDE, SODIUM GLUCONATE, SODIUM ACETATE, POTASSIUM CHLORIDE, MAGNESIUM CHLORIDE, SODIUM PHOSPHATE, DIBASIC, AND POTASSIUM PHOSPHATE .53; .5; .37; .037; .03; .012; .00082 G/100ML; G/100ML; G/100ML; G/100ML; G/100ML; G/100ML; G/100ML
100 INJECTION, SOLUTION INTRAVENOUS CONTINUOUS
Status: DISCONTINUED | OUTPATIENT
Start: 2022-05-27 | End: 2022-05-28

## 2022-05-27 RX ADMIN — DESMOPRESSIN ACETATE 31.6 MCG: 4 SOLUTION INTRAVENOUS at 19:53

## 2022-05-27 RX ADMIN — SODIUM CHLORIDE, SODIUM GLUCONATE, SODIUM ACETATE, POTASSIUM CHLORIDE, MAGNESIUM CHLORIDE, SODIUM PHOSPHATE, DIBASIC, AND POTASSIUM PHOSPHATE 100 ML/HR: .53; .5; .37; .037; .03; .012; .00082 INJECTION, SOLUTION INTRAVENOUS at 20:15

## 2022-05-27 RX ADMIN — LEVETIRACETAM 500 MG: 500 TABLET, FILM COATED ORAL at 22:00

## 2022-05-27 NOTE — ED ATTENDING ATTESTATION
5/27/2022  I, Madison Lutz MD, saw and evaluated the patient  I have discussed the patient with the resident/non-physician practitioner and agree with the resident's/non-physician practitioner's findings, Plan of Care, and MDM as documented in the resident's/non-physician practitioner's note, except where noted  All available labs and Radiology studies were reviewed  I was present for key portions of any procedure(s) performed by the resident/non-physician practitioner and I was immediately available to provide assistance  At this point I agree with the current assessment done in the Emergency Department  I have conducted an independent evaluation of this patient a history and physical is as follows:    OA: 80 y/o m s/p fall one month ago with known SDH now with worsening SDH  Pt had repeat imaging performed today showing changes since last scan  Pt is deaf at baseline and does admit to mild dysarthria that has been ongoing since prior to the initial injury  Otherwise denies any new fatigue/weakness/focal numbness/weakness/tingling  No new falls/chills  No cp/sob/abd pain/n/v  No fevers/chills  Daughter notes that he has been very active though and still working in the yard  No blood thinners/ASA  PE, NAD, well appearing m, VSS, Nc/AT, MMM, neck supple/FROM, RR/-murmurs, lungs CTAB, -w/r, abd soft, +BS, -r/g, - CULP, 5/5 strength and intact sensation throughout, symmetric face, intact pulses, awake and oriented  A/p acute on chronic SDH  Obtain basic labs, discuss with NS and trauma  Likely admit       ED Course         Critical Care Time  Procedures

## 2022-05-27 NOTE — TELEPHONE ENCOUNTER
Called by radiology about patient's [facility] for immediate findings that show acute on chronic left SDH that has increased in size since prior imaging  Given the large size of the SDH with increased midline shift, recommend that patient be admitted for close neurological monitoring, blood pressure control and neurosurgical evaluation  Attempted to call patient/wife, his son Dominique Whiting but went to voicemail  Pt and his wife may be in transit from Radiology  Called pt's daughter Heraclio Rios and left a message to contact the neurosurgery office  Should patient or his family call, please advise that patient go to the ED at [facility]  Addendum  Patient's wife Ike Samuel called back  She reports that patient denies any headache or dizziness  He denies any new numbness or weakness  She reports that patient has been his usual self and doing his usual chores including yardwork  Discussed with her that given the acute on chronic hemorrhage, increased size of hemorrhage and midline shift, would like patient to go to the ED for evaluation  She was thankful for the call  She said she would call her daughter and arrange for patient to go to the ED likely at [place]

## 2022-05-27 NOTE — CONSULTS
[address] [facility] 1940, 79 y o  male MRN: [id number]  Unit/Bed#: ED 01 Encounter: [id number]  Primary Care Provider: Sharon Roger DO   Date and time admitted to hospital: 5/27/2022  3:04 PM    Inpatient consult to Neurosurgery  Consult performed by: Jhoana Ku PA-C  Consult ordered by: Chandrakant Weiner MD          SDH (subdural hematoma) [facility])  Assessment & Plan  Pleasant 79year old male that presents to the ED due to findings on CT scan of an acute on chronic SDH  · Very hard of hearing  · GCS 15; CULP 5/5  No franks's, clonus, or drift  Imaging:  · [facility] 5/27/22: Increasing acute on chronic subdural hematoma compared to prior studies with  increasing mass effect and midline shift  · [facility] 5/10/22: Increase size of left hemispheric low dense extra-axial collection most compatible with chronic subdural hygroma  Chronic subdural hematoma less likely  New 4 mm left to right shift is noted  Resolution of right hemispheric low dense collection  Plan:  · Continue frequent neurological checks  · STAT CT head with any neurological decline including drop GCS of 2pts within 1 hr   · Recommend SBP <160mmHg  · Anti-seizure management per trauma protocol  · Hold all antiplatelet and anticoagulation medications  · Patient does not take any home AC/AP at baseline  · Pain control per primary team  · Mobilize with PT/OT  · DVT PPX: SCDs only at this time  · Ongoing medical management per primary team/trauma  · Recommend evacuation of subdural collection  Timing of surgery TBD  Neurosurgery will continue to follow as needed  Call with questions/concerns  History of Present Illness     This is a pleasant 79year old that presents to the ED after a routine CTH showed emergent findings of an acute on chronic SDH  Patient is accompanied at bedside with his wife and daughter who works for [employer] ([employer])  Patient is very hard of hearing  According to his wife, the patient has been doing very well all day  He has not complained of any headaches, numbness, tingling, weakness, or gait dysfunction  Earlier today he had breakfast before his [address] with his wife and did some lawn work effortlessly  Family deny any new trauma or injury  Of note, patient was last seen virtually in our office on 5/13/22  Review of Systems   Constitutional: Negative  HENT: Negative  Eyes: Negative  Respiratory: Negative  Cardiovascular: Negative  Gastrointestinal: Negative  Negative for nausea and vomiting  Endocrine: Negative  Genitourinary: Negative  Skin: Negative for color change and rash  Neurological: Negative for weakness and headaches  Hematological: Negative  Psychiatric/Behavioral: Negative  Negative for confusion  All other systems reviewed and are negative  A 10 point system review was performed and otherwise negative, save what is noted above  Historical Information   Past Medical History:   Diagnosis Date    Arthritis     Encounter for general adult medical examination without abnormal findings 3/20/2019    Hyperlipidemia      Past Surgical History:   Procedure Laterality Date    HERNIA REPAIR Right     inguinal    [place] REVISE MEDIAN N/CARPAL TUNNEL SURG Right 9/20/2021    Procedure: RELEASE CARPAL TUNNEL;  Surgeon: Carmela Esparza MD;  Location: [facility];  Service: Orthopedics     Social History     Substance and Sexual Activity   Alcohol Use Yes    Comment: Rare     Social History     Substance and Sexual Activity   Drug Use No     Social History     Tobacco Use   Smoking Status Former Smoker   Smokeless Tobacco Never Used   Tobacco Comment    Smoked as a teenager     History reviewed  No pertinent family history  Meds/Allergies   all current active meds have been reviewed  No Known Allergies    Objective   I/O     None          Physical Exam  Vitals reviewed     Constitutional:       General: He is awake  He is not in acute distress  Appearance: Normal appearance  He is well-developed and well-groomed  He is not ill-appearing  HENT:      Head: Normocephalic  Nose: Nose normal       Mouth/Throat:      Mouth: Mucous membranes are dry  Eyes:      Extraocular Movements: Extraocular movements intact  Conjunctiva/sclera: Conjunctivae normal       Pupils: Pupils are equal, round, and reactive to light  Pulmonary:      Effort: Pulmonary effort is normal       Breath sounds: Normal breath sounds  Abdominal:      General: Abdomen is flat  Musculoskeletal:      Cervical back: Normal range of motion  Skin:     General: Skin is warm and dry  Neurological:      General: No focal deficit present  Mental Status: He is alert and oriented to person, place, and time  Mental status is at baseline  GCS: GCS eye subscore is 4  GCS verbal subscore is 5  GCS motor subscore is 6  Cranial Nerves: Cranial nerves are intact  Sensory: Sensation is intact  No sensory deficit  Motor: Motor function is intact  No weakness or pronator drift  Coordination: Coordination is intact  Finger-Nose-Finger Test normal       Deep Tendon Reflexes: Strength normal       Reflex Scores:       Patellar reflexes are 1+ on the right side and 1+ on the left side  Psychiatric:         Attention and Perception: Attention and perception normal          Mood and Affect: Mood and affect normal          Speech: Speech normal          Behavior: Behavior normal  Behavior is cooperative  Thought Content: Thought content normal          Cognition and Memory: Cognition and memory normal          Judgment: Judgment normal        Neurologic Exam     Mental Status   Oriented to person, place, and time  Follows 3 step commands  Attention: normal  Concentration: normal    Speech: speech is normal   Level of consciousness: alert  Normal comprehension       Cranial Nerves   Cranial nerves II through XII intact  CN III, IV, VI   Pupils are equal, round, and reactive to light  Motor Exam   Muscle bulk: normal  Overall muscle tone: normal  Right arm pronator drift: absent  Left arm pronator drift: absent    Strength   Strength 5/5 throughout  Sensory Exam   Light touch normal      Gait, Coordination, and Reflexes     Coordination   Finger to nose coordination: normal    Reflexes   Right patellar: 1+  Left patellar: 1+  Right Pereira: absent  Left Pereira: absent  Right ankle clonus: absent  Left ankle clonus: absent        Vitals:Blood pressure 132/75, pulse 72, temperature 98 7 °F (37 1 °C), temperature source Oral, resp  rate 18, SpO2 97 %  ,There is no height or weight on file to calculate BMI  Lab Results:   Results from last 7 days   Lab Units 05/27/22  1600   WBC Thousand/uL 5 73   HEMOGLOBIN g/dL 14 2   HEMATOCRIT % 42 6   PLATELETS Thousands/uL 380   NEUTROS PCT % 66   MONOS PCT % 11     Results from last 7 days   Lab Units 05/27/22  1600   POTASSIUM mmol/L 4 2   CHLORIDE mmol/L 107   CO2 mmol/L 25   BUN mg/dL 15   CREATININE mg/dL 0 78   CALCIUM mg/dL 9 0   ALK PHOS U/L 79   ALT U/L 30   AST U/L 18             Results from last 7 days   Lab Units 05/27/22  1600   INR  1 06   PTT seconds 30     No results found for: TROPONINT  ABG:No results found for: PHART, YTK0CGF, PO2ART, MXZ7XSQ, R3AFBCDO, BEART, SOURCE    Imaging Studies: I have personally reviewed pertinent films in PACS with Dr Renee Butcher    CT head wo contrast    Result Date: 5/27/2022  Impression: Increasing acute on chronic subdural hematoma compared to prior studies with  increasing mass effect and midline shift  Workstation performed: [id number]       VTE Prophylaxis: Sequential compression device Ruthell Offer)     Code Status: No Order  Advance Directive and Living Will:      Power of :    POLST:      Counseling / Coordination of Care  I spent 20 minutes with the patient

## 2022-05-27 NOTE — H&P
H&P - Trauma   Sherine Jacinto 79 y o  male MRN: [id number]  Unit/Bed#: ED 01 Encounter: [id number]    Trauma Alert: Evaluation; trauma team arrived at 5:15pm   Model of Arrival: Office/Clinic    Trauma Team: Attending Marlin Fleischer and [facility]  Consultants:     Neurosurgery: Dr Kailyn Sesay - STAT consult; notified at 5:30pm via text; Assessment/Plan   Active Problems / Assessment:   Acute on chronic subdural hemorrhage     Plan:   Admit to ICU  Neurosurgery consult for possible evacuation and MMA embolization  Frequent neuro checks  Hold chemical DVT PPX  PT/OT    History of Present Illness     Chief Complaint: Subdural hemorrhage  Mechanism:Other: History of fall     HPI:    Sherine Jacinto is a 79 y o  male who presents following a routine follow-up head CT for history of recent fall with subdural hemorrhage  CT head today showed enlarging acute on chronic subdural hematoma with increasing midline shift  He was subsequently contacted by the neurosurgery team who recommended he attend the emergency department for evaluation for possible evacuation  Currently patient reports no new symptoms  He denies numbness, weakness, headache or dizziness  He is hard of hearing but this is normal for him  He denies any anticoagulant or anti-platelet use  Review of Systems   Constitutional: Negative  HENT: Negative  Eyes: Negative  Respiratory: Negative  Cardiovascular: Negative  Gastrointestinal: Negative  Endocrine: Negative  Genitourinary: Negative  Musculoskeletal: Negative  Skin: Negative  Neurological: Negative  Psychiatric/Behavioral: Negative  12-point, complete review of systems was reviewed and negative except as stated above       Historical Information     Past Medical History:   Diagnosis Date    Arthritis     Encounter for general adult medical examination without abnormal findings 3/20/2019    Hyperlipidemia      Past Surgical History:   Procedure Laterality Date  HERNIA REPAIR Right     inguinal    [place] REVISE MEDIAN N/CARPAL TUNNEL SURG Right 9/20/2021    Procedure: RELEASE CARPAL TUNNEL;  Surgeon: Kylie Juares MD;  Location: [facility];  Service: Orthopedics        Social History     Tobacco Use    Smoking status: Former Smoker    Smokeless tobacco: Never Used    Tobacco comment: Smoked as a teenager   Vaping Use    Vaping Use: Never used   Substance Use Topics    Alcohol use: Yes     Comment: Rare    Drug use: No     Immunization History   Administered Date(s) Administered    COVID-19 MODERNA VACC 0 25 ML IM BOOSTER 11/01/2021    COVID-19 MODERNA VACC 0 5 ML IM 01/19/2021, 02/17/2021    INFLUENZA 10/11/2017, 09/05/2019    Influenza, high dose seasonal 0 7 mL 09/20/2018, 10/12/2020, 12/06/2021    Pneumococcal 11/12/2012, 01/06/2017    Pneumococcal Conjugate 13-Valent 06/12/2020    Pneumococcal Polysaccharide PPV23 07/08/2021    Tdap 04/08/2021     Last Tetanus: Unknown, not indicated  Family History: Non-contributory    1  Before the illness or injury that brought you to the Emergency, did you need someone to help you on a regular basis? 1=Yes   2  Since the illness or injury that brought you to the Emergency, have you needed more help than usual to take care of yourself? 0=No   3  Have you been hospitalized for one or more nights during the past 6 months (excluding a stay in the Emergency Department)? 0=No   4  In general, do you see well? 0=Yes   5  In general, do you have serious problems with your memory? 0=No   6  Do you take more than three different medications everyday? 0=No   TOTAL   1     Did you order a geriatric consult if the score was 2 or greater?: n/a     Meds/Allergies   current meds:   No current facility-administered medications for this encounter  and PTA meds:   Prior to Admission Medications   Prescriptions Last Dose Informant Patient Reported? Taking?    Multiple Vitamin (MULTIVITAMIN) tablet  Spouse/Significant Other Yes No Sig: Take by mouth daily    Multiple Vitamins-Minerals (PRESERVISION AREDS 2 PO)  Spouse/Significant Other Yes No   Sig: Take by mouth   simvastatin (ZOCOR) 20 mg tablet   No No   Sig: Take 1 tablet (20 mg total) by mouth daily at bedtime      Facility-Administered Medications: None      No Known Allergies    Objective   Initial Vitals:   Temperature: 98 7 °F (37 1 °C) (05/27/22 1458)  Pulse: 78 (05/27/22 1458)  Respirations: 18 (05/27/22 1458)  Blood Pressure: 107/79 (05/27/22 1458)    Primary Survey:   Airway:        Status: patent;        Pre-hospital Interventions: none        Hospital Interventions: none  Breathing:        Pre-hospital Interventions: none       Effort: normal       Right breath sounds: normal       Left breath sounds: normal  Circulation:        Rhythm: regular       Rate: regular   Right Pulses Left Pulses    R radial: 2+    R pedal: 2+     L radial: 2+    L pedal: 2+       Disability:        GCS: Eye: 4; Verbal: 5 Motor: 6 Total: 15       Right Pupil: 3 mm;  round;  reactive         Left Pupil:  3 mm;  round;  reactive      R Motor Strength L Motor Strength    R : 5/5  R dorsiflex: 5/5  R plantarflex: 5/5 L : 5/5  L dorsiflex: 5/5  L plantarflex: 5/5        Sensory:  No sensory deficit  Exposure:       Completed: Yes      Secondary Survey:  Physical Exam  Constitutional:       Appearance: Normal appearance  HENT:      Head: Normocephalic and atraumatic  Right Ear: External ear normal       Left Ear: External ear normal       Nose: Nose normal       Mouth/Throat:      Mouth: Mucous membranes are moist       Pharynx: Oropharynx is clear  Eyes:      Extraocular Movements: Extraocular movements intact  Conjunctiva/sclera: Conjunctivae normal       Pupils: Pupils are equal, round, and reactive to light  Cardiovascular:      Rate and Rhythm: Normal rate and regular rhythm  Pulses: Normal pulses     Pulmonary:      Effort: Pulmonary effort is normal    Abdominal: General: Abdomen is flat  Palpations: Abdomen is soft  Tenderness: There is no abdominal tenderness  Musculoskeletal:         General: Normal range of motion  Cervical back: Normal range of motion  Skin:     General: Skin is warm and dry  Neurological:      General: No focal deficit present  Mental Status: He is alert and oriented to person, place, and time  Psychiatric:         Mood and Affect: Mood normal          Behavior: Behavior normal          Invasive Devices  Report    Peripheral Intravenous Line  Duration           Peripheral IV 05/27/22 Left Arm <1 day              Lab Results: Results: I have personally reviewed all pertinent laboratory/tests results    Imaging Results: I have personally reviewed pertinent reports  Chest Xray(s): N/A   FAST exam(s): N/A   CT Scan(s): positive for acute findings: enlarging SDH with midline shift   Additional Xray(s): N/A     Other Studies: None    Code Status: No Order  Advance Directive and Living Will:      Power of :    POLST:    I have spent 30 minutes with Patient and family today in which greater than 50% of this time was spent in counseling/coordination of care regarding Diagnostic results, Prognosis, Risks and benefits of tx options, Intructions for management, Patient and family education and Impressions

## 2022-05-27 NOTE — ED PROVIDER NOTES
History  Chief Complaint   Patient presents with    Evaluation of Abnormal Diagnostic Test     Patient had follow up CT scan and it was worsening, sent by Neurology for admission  69-year-old male history of hyperlipidemia presenting from outpatient CT was concern for worsening subdural   Patient suffered a fall and head injury on 04/12 and had a subdural hematoma  Patient was hospitalized  Patient has been at baseline at home per wife and daughter at bedside  Denies any weakness or sensory changes or loss of bladder or bowel function or other focal neurological changes  At baseline, failure reports patient has been having worsening dysarthria but that began months before this head injury  Patient is also deaf at baseline  Patient denies any pain or acute changes  Had a follow-up CT head earlier today reporting increased size of subdural and 8 mm of shift  Denies any other complaints  Prior to Admission Medications   Prescriptions Last Dose Informant Patient Reported? Taking? Multiple Vitamin (MULTIVITAMIN) tablet  Spouse/Significant Other Yes No   Sig: Take by mouth daily    Multiple Vitamins-Minerals (PRESERVISION AREDS 2 PO)  Spouse/Significant Other Yes No   Sig: Take by mouth   simvastatin (ZOCOR) 20 mg tablet   No No   Sig: Take 1 tablet (20 mg total) by mouth daily at bedtime      Facility-Administered Medications: None       Past Medical History:   Diagnosis Date    Arthritis     Encounter for general adult medical examination without abnormal findings 3/20/2019    Hyperlipidemia        Past Surgical History:   Procedure Laterality Date    HERNIA REPAIR Right     inguinal    [place] REVISE MEDIAN N/CARPAL TUNNEL SURG Right 9/20/2021    Procedure: RELEASE CARPAL TUNNEL;  Surgeon: Radha Weiss MD;  Location: [facility];  Service: Orthopedics       History reviewed  No pertinent family history  I have reviewed and agree with the history as documented      E-Cigarette/Vaping    E-Cigarette Use Never User      E-Cigarette/Vaping Substances    Nicotine No     THC No     CBD No     Flavoring No     Other No     Unknown No      Social History     Tobacco Use    Smoking status: Former Smoker    Smokeless tobacco: Never Used    Tobacco comment: Smoked as a teenager   Vaping Use    Vaping Use: Never used   Substance Use Topics    Alcohol use: Yes     Comment: Rare    Drug use: No        Review of Systems   Constitutional: Negative for appetite change, chills, diaphoresis, fever and unexpected weight change  HENT: Negative for congestion and rhinorrhea  Eyes: Negative for photophobia and visual disturbance  Respiratory: Negative for cough, chest tightness and shortness of breath  Cardiovascular: Negative for chest pain, palpitations and leg swelling  Gastrointestinal: Negative for abdominal distention, abdominal pain, blood in stool, constipation, diarrhea, nausea and vomiting  Genitourinary: Negative for dysuria and hematuria  Musculoskeletal: Negative for back pain, joint swelling, neck pain and neck stiffness  Skin: Negative for color change, pallor, rash and wound  Neurological: Negative for dizziness, syncope, weakness, light-headedness and headaches  Psychiatric/Behavioral: Negative for agitation  All other systems reviewed and are negative  Physical Exam  ED Triage Vitals [05/27/22 1458]   Temperature Pulse Respirations Blood Pressure SpO2   98 7 °F (37 1 °C) 78 18 107/79 96 %      Temp Source Heart Rate Source Patient Position - Orthostatic VS BP Location FiO2 (%)   Oral Monitor Sitting Left arm --      Pain Score       No Pain             Orthostatic Vital Signs  Vitals:    05/27/22 1458 05/27/22 1600 05/27/22 1700   BP: 107/79 132/75 125/78   Pulse: 78 72 66   Patient Position - Orthostatic VS: Sitting         Physical Exam  Vitals and nursing note reviewed  Constitutional:       General: He is not in acute distress       Appearance: Normal appearance  He is well-developed  He is not ill-appearing, toxic-appearing or diaphoretic  HENT:      Head: Normocephalic and atraumatic  Nose: Nose normal  No congestion or rhinorrhea  Mouth/Throat:      Mouth: Mucous membranes are moist       Pharynx: Oropharynx is clear  No oropharyngeal exudate or posterior oropharyngeal erythema  Eyes:      General: No scleral icterus  Right eye: No discharge  Left eye: No discharge  Extraocular Movements: Extraocular movements intact  Conjunctiva/sclera: Conjunctivae normal       Pupils: Pupils are equal, round, and reactive to light  Neck:      Vascular: No JVD  Trachea: No tracheal deviation  Cardiovascular:      Rate and Rhythm: Normal rate and regular rhythm  Heart sounds: Normal heart sounds  No murmur heard  No friction rub  No gallop  Comments: Normal rate and regular rhythm  Pulmonary:      Effort: Pulmonary effort is normal  No respiratory distress  Breath sounds: Normal breath sounds  No stridor  No wheezing or rales  Comments: Clear to auscultation bilaterally  Chest:      Chest wall: No tenderness  Abdominal:      General: Bowel sounds are normal  There is no distension  Palpations: Abdomen is soft  Tenderness: There is no abdominal tenderness  There is no right CVA tenderness, left CVA tenderness, guarding or rebound  Comments: Soft, nontender, nondistended  Normal bowel sounds throughout   Musculoskeletal:         General: No swelling, tenderness, deformity or signs of injury  Normal range of motion  Cervical back: Normal range of motion and neck supple  No rigidity  No muscular tenderness  Right lower leg: No edema  Left lower leg: No edema  Lymphadenopathy:      Cervical: No cervical adenopathy  Skin:     General: Skin is warm and dry  Coloration: Skin is not pale  Findings: No erythema or rash     Neurological:      General: No focal deficit present  Mental Status: He is alert and oriented to person, place, and time  Mental status is at baseline  Cranial Nerves: No cranial nerve deficit  Sensory: No sensory deficit  Motor: No weakness or abnormal muscle tone  Coordination: Coordination normal       Gait: Gait normal       Comments: A&Ox3 to person, place, and time  CN 2-12 intact  Strength 5/5 throughout  Sensation intact throughout  Cerebellar exam excluding gait intact  Psychiatric:         Behavior: Behavior normal          Thought Content:  Thought content normal          ED Medications  Medications - No data to display    Diagnostic Studies  Results Reviewed     Procedure Component Value Units Date/Time    Comprehensive metabolic panel [[id number]] Collected: 05/27/22 1600    Lab Status: Final result Specimen: Blood from Arm, Left Updated: 05/27/22 1633     Sodium 139 mmol/L      Potassium 4 2 mmol/L      Chloride 107 mmol/L      CO2 25 mmol/L      ANION GAP 7 mmol/L      BUN 15 mg/dL      Creatinine 0 78 mg/dL      Glucose 94 mg/dL      Calcium 9 0 mg/dL      AST 18 U/L      ALT 30 U/L      Alkaline Phosphatase 79 U/L      Total Protein 7 5 g/dL      Albumin 3 6 g/dL      Total Bilirubin 0 52 mg/dL      eGFR 84 ml/min/1 73sq m     Narrative:      [place] guidelines for Chronic Kidney Disease (CKD):     Stage 1 with normal or high GFR (GFR > 90 mL/min/1 73 square meters)    Stage 2 Mild CKD (GFR = 60-89 mL/min/1 73 square meters)    Stage 3A Moderate CKD (GFR = 45-59 mL/min/1 73 square meters)    Stage 3B Moderate CKD (GFR = 30-44 mL/min/1 73 square meters)    Stage 4 Severe CKD (GFR = 15-29 mL/min/1 73 square meters)    Stage 5 End Stage CKD (GFR <15 mL/min/1 73 square meters)  Note: GFR calculation is accurate only with a steady state creatinine    Protime-INR [[id number]]  (Normal) Collected: 05/27/22 1600    Lab Status: Final result Specimen: Blood from Arm, Left Updated: 05/27/22 8635 Protime 13 3 seconds      INR 1 06    APTT [[id number]]  (Normal) Collected: 05/27/22 1600    Lab Status: Final result Specimen: Blood from Arm, Left Updated: 05/27/22 1628     PTT 30 seconds     CBC and differential [[id number]] Collected: 05/27/22 1600    Lab Status: Final result Specimen: Blood from Arm, Left Updated: 05/27/22 1618     WBC 5 73 Thousand/uL      RBC 4 39 Million/uL      Hemoglobin 14 2 g/dL      Hematocrit 42 6 %      MCV 97 fL      MCH 32 3 pg      MCHC 33 3 g/dL      RDW 13 1 %      MPV 9 1 fL      Platelets 410 Thousands/uL      nRBC 0 /100 WBCs      Neutrophils Relative 66 %      Immat GRANS % 0 %      Lymphocytes Relative 21 %      Monocytes Relative 11 %      Eosinophils Relative 1 %      Basophils Relative 1 %      Neutrophils Absolute 3 82 Thousands/µL      Immature Grans Absolute 0 01 Thousand/uL      Lymphocytes Absolute 1 18 Thousands/µL      Monocytes Absolute 0 62 Thousand/µL      Eosinophils Absolute 0 07 Thousand/µL      Basophils Absolute 0 03 Thousands/µL                  XR chest 1 view portable    (Results Pending)         Procedures  Procedures      ED Course               Identification of Seniors at [address] [place] [address] Most Recent Value   (ISAR) Identification of Seniors at Risk    Before the illness or injury that brought you to the Emergency, did you need someone to help you on a regular basis? 0 Filed at: 05/27/2022 1459   In the last 24 hours, have you needed more help than usual? 0 Filed at: 05/27/2022 1459   Have you been hospitalized for one or more nights during the past 6 months? 1 Filed at: 05/27/2022 1459   In general, do you see well? 0 Filed at: 05/27/2022 1459   In general, do you have serious problems with your memory? 0 Filed at: 05/27/2022 1459   Do you take more than three different medications every day?  1 Filed at: 05/27/2022 1459   ISAR Score 2 Filed at: 05/27/2022 1459                              MDM  Number of Diagnoses or Management Options  Subdural hematoma ([facility] [address] )  Diagnosis management comments: 24-year-old male history of hyperlipidemia presenting from outpatient CT was concern for worsening subdural   Worsening subdural without reported recurrent injury or antiplatelet anticoagulant medications in setting of patient reporting asymptomatic and normal neurological exam   Discussed with trauma and neurosurgery  Neurosurgery recommending NPO and admission to trauma with possible OR in a m     Basic labs plus coags and other preop labs including EKG and chest x-ray  Patient remains stable  Admitted to trauma ICU  Amount and/or Complexity of Data Reviewed  Clinical lab tests: reviewed and ordered  Tests in the radiology section of CPT®: reviewed  Tests in the medicine section of CPT®: reviewed and ordered  Review and summarize past medical records: yes  Discuss the patient with other providers: yes  Independent visualization of images, tracings, or specimens: yes    Risk of Complications, Morbidity, and/or Mortality  Presenting problems: high  Diagnostic procedures: high  Management options: high    Patient Progress  Patient progress: stable      Disposition  Final diagnoses:   Subdural hematoma ([facility] [address] )     Time reflects when diagnosis was documented in both MDM as applicable and the Disposition within this note     Time User Action Codes Description Comment    5/27/2022  3:37 PM Morteza Hahn Add [S06 [id number]] Subdural hematoma [facility])       ED Disposition     ED Disposition   Admit    Condition   Stable    Date/Time   Fri May 27, 2022  4:23 PM    Comment   Case was discussed with trauma and the patient's admission status was agreed to be Admission Status: inpatient status to the service of Dr Alma Zuniga   Follow-up Information    None         Patient's Medications   Discharge Prescriptions    No medications on file     No discharge procedures on file      PDMP Review       Value Time User    PDMP Reviewed  Yes 9/20/2021  7:59 AM Carmela Esparza MD ED Provider  Attending physically available and evaluated Eleazar Pay  I managed the patient along with the ED Attending      Electronically Signed by         Tod Cheung MD  05/27/22 5076

## 2022-05-27 NOTE — TELEPHONE ENCOUNTER
Called patients daughter asked if Kia Bipin or Brittany Mckeon had a cell phone  Dyan Radford provided me with her mothers cell phone # [phone]  She asked if I did not reach her mother to please call her back  Called [phone], spoke to Kia Voss, explained that ROSAURA asked the office to reach out to have the patient report to the ER, after receiving a call from the Radiology Department  She asked if she could go to the [facility], I explained she is welcome to do so, but he will likely need to be transferred to [facility]  If the patient is stable enough, she could bring him directly to [facility] ER  Kia Voss requested to speak with Merlin Sprinkles, PA  I connected Kia Voss with ROSAURA to continue discussion

## 2022-05-27 NOTE — ASSESSMENT & PLAN NOTE
POD3 left craniotomy for SDH evacuation  · Patient presented to the ED due to findings on CT scan of an acute on chronic SDH  · Very hard of hearing  · On exam, RLE weakness 4-/5  Otherwise, neuro intact and GCS 15      Imaging:  · CT head w/o, 5/31/22: Left subdural collection that is mostly due to pneumocephalus and fluid is overall stable in size following removal of one of 2 subdural drains but small amount of residual subdural blood products mildly increased    Stable mass effect with 1 cm of left-to-right shift    Plan:  · Continue frequent neurological checks  · MRA reviewed and shows occlusion of MMA  However, after review with neuroendovascular attending, this may be secondary to pneumocephalus  Will continue drain and consider MMA embolization Thursday if patient/family is interested  · 1 SDD to -10mmHg, 140Ss drainage p24h  · Continue drain at this time  · HOB < 30 degrees until removed, ok to sit up to eat  · STAT CT head with any neurological decline including drop GCS of 2pts within 1 hr   · Repeat CT head Thursday AM   · SBP <160mmHg  · Keppra 500mg x7 days post op  · Hold all antiplatelet and anticoagulation medications  · Patient does not take any home AC/AP at baseline  · Medical management and pain control per primary team  · Mobilize with PT/OT when medically appropriate  · DVT PPX: SCDs only at this time  Can start [facility] after SDD removed    Neurosurgery will continue to follow  Call with questions/concerns

## 2022-05-27 NOTE — CONSULTS
[id number] Tatianna Arreola 79 y o  male MRN: [id number]  Unit/Bed#: [facility] 605-01 Encounter: [id number]      -------------------------------------------------------------------------------------------------------------  Chief Complaint: No complaints    History of Present Illness   HX and PE limited by: MAX  Sherine Jacinto is a 79 y o  male who presents with worsening SDH with increasing midline shift on follow-up CTH  Pt was subsequently contacted to present to the ED, which he did  Of note, pt has history of fall down stairs and head strike on 4/12/22 and was found to have a SDH collection for which Neurosurgery was following with serial CTH  Pt currently experiencing no symptoms, but due to the increase in size, he will be admitted to critical care for monitoring  Communicated with pt by writing since he is hard of hearing  He denies pain, nausea, vomiting, headache, chest pain, SOB, abdominal pain, weakness, numbness, tingling in extremities  He was tolerating a diet at home without issues  He denies constipation, diarrhea and issues with urination  History obtained from the patient   -------------------------------------------------------------------------------------------------------------  Assessment and Plan:    Neuro: Acute on chronic SDH   Diagnosis: Acute on chronic SDH   CTH: increased size and density of of L subdural collection with mass effect and midline shift  Mild effacement of L lateral ventricle    o Plan:  - Admit to ICU  - Q1 hr neuro checks  - Hold chemical dvt px  - Delirium precautions  - F/u MRI  - Neurosurgical c/s   DDAVP   Platelets on hold for possible OR on 5/28     CV:    Diagnosis: No acute issues  o Plan:   - Continuous cardiac monitoring  - Maintain MAPs over 65  - Hx of HLD-continue statin        Pulm:   Diagnosis: No acute issues  o Plan:   - Maintain sats greater than 92%      GI:    Diagnosis: No acute issues  o Plan:   - NPO sips with meds  - Will hold on GI px for now  -       : normal BUN/Cr   Diagnosis: No acute issues  o Plan:    Monitor BUN and creatinine   Strict I's and O's       F/E/N:    Plan:   o Fluids:  Isolyte @100  o Electrolytes; WNL; continue to monitor  o Nutrition: NPO, sips with meds      Heme/Onc:    Diagnosis: no acute issues  o Plan:   - Monitor Hbg  - Hold chemical DVT px      Endo:    Diagnosis: No acute issues  o Plan:   - Maintain Normoglycemia      ID:    Diagnosis: No acute issues  o Plan:   - No leukocytosis      MSK/Skin:    Diagnosis: No acute issues  o Plan:  - Encourage OOB to chair and early mobilization       Disposition: Admit to Critical Care   Code Status: No Order  --------------------------------------------------------------------------------------------------------------  Review of Systems   All other systems reviewed and are negative  Except as noted in above HPI  A 12-point, complete review of systems was reviewed and negative except as stated above     Physical Exam  Vitals reviewed  Constitutional:       General: He is not in acute distress  Appearance: He is not ill-appearing, toxic-appearing or diaphoretic  HENT:      Head: Normocephalic and atraumatic  Eyes:      Extraocular Movements: Extraocular movements intact  Cardiovascular:      Rate and Rhythm: Normal rate  Pulmonary:      Effort: Pulmonary effort is normal  No respiratory distress  Abdominal:      General: There is no distension  Palpations: Abdomen is soft  Musculoskeletal:      Right lower leg: No edema  Left lower leg: No edema  Skin:     General: Skin is warm and dry  Neurological:      Mental Status: He is alert and oriented to person, place, and time  Mental status is at baseline  Cranial Nerves: No cranial nerve deficit  Sensory: No sensory deficit        Comments: Moving all extremities  Answers questions appropriately  No weakness noted on exam   Psychiatric:         Mood and Affect: Mood normal        --------------------------------------------------------------------------------------------------------------  Vitals:   Vitals:    05/27/22 1730 05/27/22 1800 05/27/22 1833 05/27/22 1913   BP: 130/76 124/75 143/81 136/82   BP Location:       Pulse: 74 76  71   Resp: 16 (!) 23  16   Temp:   98 3 °F (36 8 °C) 97 7 °F (36 5 °C)   TempSrc:       SpO2: 96% 97%  96%     Temp  Min: 97 7 °F (36 5 °C)  Max: 98 7 °F (37 1 °C)        There is no height or weight on file to calculate BMI    N/A    Laboratory and Diagnostics:  Results from last 7 days   Lab Units 05/27/22  1600   WBC Thousand/uL 5 73   HEMOGLOBIN g/dL 14 2   HEMATOCRIT % 42 6   PLATELETS Thousands/uL 380   NEUTROS PCT % 66   MONOS PCT % 11     Results from last 7 days   Lab Units 05/27/22  1600   SODIUM mmol/L 139   POTASSIUM mmol/L 4 2   CHLORIDE mmol/L 107   CO2 mmol/L 25   ANION GAP mmol/L 7   BUN mg/dL 15   CREATININE mg/dL 0 78   CALCIUM mg/dL 9 0   GLUCOSE RANDOM mg/dL 94   ALT U/L 30   AST U/L 18   ALK PHOS U/L 79   ALBUMIN g/dL 3 6   TOTAL BILIRUBIN mg/dL 0 52          Results from last 7 days   Lab Units 05/27/22  1600   INR  1 06   PTT seconds 30              ABG:    VBG:          Micro:        EKG: NA  Imaging:   XR chest 1 view portable    (Results Pending)   MRI inpatient order    (Results Pending)         Historical Information   Past Medical History:   Diagnosis Date    Arthritis     Encounter for general adult medical examination without abnormal findings 3/20/2019    Hyperlipidemia      Past Surgical History:   Procedure Laterality Date    HERNIA REPAIR Right     inguinal    [place] REVISE MEDIAN N/CARPAL TUNNEL SURG Right 9/20/2021    Procedure: RELEASE CARPAL TUNNEL;  Surgeon: Wm Chu MD;  Location: [facility];  Service: Orthopedics     Social History   Social History     Substance and Sexual Activity   Alcohol Use Yes    Comment: Rare     Social History     Substance and Sexual Activity   Drug Use No     Social History     Tobacco Use   Smoking Status Former Smoker   Smokeless Tobacco Never Used   Tobacco Comment    Smoked as a teenager     Exercise History: NA  Family History:   History reviewed  No pertinent family history  Medications:  Current Facility-Administered Medications   Medication Dose Route Frequency    desmopressin (DDAVP) 31 6 mcg in sodium chloride 0 9 % 50 mL IVPB  0 4 mcg/kg Intravenous Once     Home medications:  Prior to Admission Medications   Prescriptions Last Dose Informant Patient Reported? Taking? Multiple Vitamin (MULTIVITAMIN) tablet  Spouse/Significant Other Yes No   Sig: Take by mouth daily    Multiple Vitamins-Minerals (PRESERVISION AREDS 2 PO)  Spouse/Significant Other Yes No   Sig: Take by mouth   simvastatin (ZOCOR) 20 mg tablet   No No   Sig: Take 1 tablet (20 mg total) by mouth daily at bedtime      Facility-Administered Medications: None     Allergies:  No Known Allergies  ------------------------------------------------------------------------------------------------------------  Advance Directive and Living Will:      Power of :    POLST:    ------------------------------------------------------------------------------------------------------------  Anticipated Length of Stay is > 2 midnights    Care Time Delivered:       Shabbir Raza MD        Portions of the record may have been created with voice recognition software  Occasional wrong word or "sound a like" substitutions may have occurred due to the inherent limitations of voice recognition software    Read the chart carefully and recognize, using context, where substitutions have occurred

## 2022-05-27 NOTE — ASSESSMENT & PLAN NOTE
Pleasant 79year old male that presents to the ED due to findings on CT scan of an acute on chronic SDH  · Very hard of hearing  · GCS 15; CULP 5/5  No franks's, clonus, or drift  Imaging:  · [facility] 5/27/22: Increasing acute on chronic subdural hematoma compared to prior studies with  increasing mass effect and midline shift  · 14 [facility] 5/10/22: Increase size of left hemispheric low dense extra-axial collection most compatible with chronic subdural hygroma  Chronic subdural hematoma less likely  New 4 mm left to right shift is noted  Resolution of right hemispheric low dense collection  Plan:  · Continue frequent neurological checks  · STAT CT head with any neurological decline including drop GCS of 2pts within 1 hr   · Recommend SBP <160mmHg  · Anti-seizure management per trauma protocol  · Hold all antiplatelet and anticoagulation medications  · Patient does not take any home AC/AP at baseline  · Pain control per primary team  · Mobilize with PT/OT  · DVT PPX: SCDs only at this time  · Ongoing medical management per primary team/trauma  · Recommend evacuation of subdural collection  Timing of surgery TBD  Neurosurgery will continue to follow as needed  Call with questions/concerns

## 2022-05-28 ENCOUNTER — ANESTHESIA (INPATIENT)
Dept: PERIOP | Facility: HOSPITAL | Age: 82
DRG: 025 | End: 2022-05-28
Payer: MEDICARE

## 2022-05-28 ENCOUNTER — ANESTHESIA EVENT (INPATIENT)
Dept: PERIOP | Facility: HOSPITAL | Age: 82
DRG: 025 | End: 2022-05-28
Payer: MEDICARE

## 2022-05-28 LAB
ALBUMIN SERPL BCP-MCNC: 3.2 G/DL (ref 3.5–5)
ALP SERPL-CCNC: 77 U/L (ref 46–116)
ALT SERPL W P-5'-P-CCNC: 29 U/L (ref 12–78)
ANION GAP SERPL CALCULATED.3IONS-SCNC: 3 MMOL/L (ref 4–13)
AST SERPL W P-5'-P-CCNC: 22 U/L (ref 5–45)
BASOPHILS # BLD AUTO: 0.03 THOUSANDS/ΜL (ref 0–0.1)
BASOPHILS NFR BLD AUTO: 1 % (ref 0–1)
BILIRUB SERPL-MCNC: 0.58 MG/DL (ref 0.2–1)
BUN SERPL-MCNC: 14 MG/DL (ref 5–25)
CALCIUM ALBUM COR SERPL-MCNC: 9.5 MG/DL (ref 8.3–10.1)
CALCIUM SERPL-MCNC: 8.9 MG/DL (ref 8.3–10.1)
CHLORIDE SERPL-SCNC: 109 MMOL/L (ref 100–108)
CO2 SERPL-SCNC: 26 MMOL/L (ref 21–32)
CREAT SERPL-MCNC: 0.62 MG/DL (ref 0.6–1.3)
EOSINOPHIL # BLD AUTO: 0.11 THOUSAND/ΜL (ref 0–0.61)
EOSINOPHIL NFR BLD AUTO: 2 % (ref 0–6)
ERYTHROCYTE [DISTWIDTH] IN BLOOD BY AUTOMATED COUNT: 13.2 % (ref 11.6–15.1)
GFR SERPL CREATININE-BSD FRML MDRD: 92 ML/MIN/1.73SQ M
GLUCOSE SERPL-MCNC: 92 MG/DL (ref 65–140)
HCT VFR BLD AUTO: 39.7 % (ref 36.5–49.3)
HGB BLD-MCNC: 13.1 G/DL (ref 12–17)
IMM GRANULOCYTES # BLD AUTO: 0.01 THOUSAND/UL (ref 0–0.2)
IMM GRANULOCYTES NFR BLD AUTO: 0 % (ref 0–2)
INR PPP: 1.04 (ref 0.84–1.19)
LYMPHOCYTES # BLD AUTO: 0.99 THOUSANDS/ΜL (ref 0.6–4.47)
LYMPHOCYTES NFR BLD AUTO: 20 % (ref 14–44)
MAGNESIUM SERPL-MCNC: 2.4 MG/DL (ref 1.6–2.6)
MCH RBC QN AUTO: 32.1 PG (ref 26.8–34.3)
MCHC RBC AUTO-ENTMCNC: 33 G/DL (ref 31.4–37.4)
MCV RBC AUTO: 97 FL (ref 82–98)
MONOCYTES # BLD AUTO: 0.52 THOUSAND/ΜL (ref 0.17–1.22)
MONOCYTES NFR BLD AUTO: 11 % (ref 4–12)
NEUTROPHILS # BLD AUTO: 3.28 THOUSANDS/ΜL (ref 1.85–7.62)
NEUTS SEG NFR BLD AUTO: 66 % (ref 43–75)
NRBC BLD AUTO-RTO: 0 /100 WBCS
PHOSPHATE SERPL-MCNC: 3.4 MG/DL (ref 2.3–4.1)
PLATELET # BLD AUTO: 330 THOUSANDS/UL (ref 149–390)
PMV BLD AUTO: 9.1 FL (ref 8.9–12.7)
POTASSIUM SERPL-SCNC: 4 MMOL/L (ref 3.5–5.3)
PROT SERPL-MCNC: 6.7 G/DL (ref 6.4–8.2)
PROTHROMBIN TIME: 13.2 SECONDS (ref 11.6–14.5)
RBC # BLD AUTO: 4.08 MILLION/UL (ref 3.88–5.62)
SODIUM SERPL-SCNC: 138 MMOL/L (ref 136–145)
WBC # BLD AUTO: 4.94 THOUSAND/UL (ref 4.31–10.16)

## 2022-05-28 PROCEDURE — P9037 PLATE PHERES LEUKOREDU IRRAD: HCPCS

## 2022-05-28 PROCEDURE — 009400Z DRAINAGE OF INTRACRANIAL SUBDURAL SPACE WITH DRAINAGE DEVICE, OPEN APPROACH: ICD-10-PCS | Performed by: NEUROLOGICAL SURGERY

## 2022-05-28 PROCEDURE — NC001 PR NO CHARGE: Performed by: NURSE PRACTITIONER

## 2022-05-28 PROCEDURE — C1713 ANCHOR/SCREW BN/BN,TIS/BN: HCPCS | Performed by: NEUROLOGICAL SURGERY

## 2022-05-28 PROCEDURE — 88307 TISSUE EXAM BY PATHOLOGIST: CPT | Performed by: PATHOLOGY

## 2022-05-28 PROCEDURE — 83735 ASSAY OF MAGNESIUM: CPT | Performed by: SURGERY

## 2022-05-28 PROCEDURE — 99024 POSTOP FOLLOW-UP VISIT: CPT | Performed by: NEUROLOGICAL SURGERY

## 2022-05-28 PROCEDURE — 85610 PROTHROMBIN TIME: CPT | Performed by: SURGERY

## 2022-05-28 PROCEDURE — 61312 CRNEC/CRNOT STTL XDRL/SDRL: CPT | Performed by: NEUROLOGICAL SURGERY

## 2022-05-28 PROCEDURE — 99291 CRITICAL CARE FIRST HOUR: CPT | Performed by: STUDENT IN AN ORGANIZED HEALTH CARE EDUCATION/TRAINING PROGRAM

## 2022-05-28 PROCEDURE — 80053 COMPREHEN METABOLIC PANEL: CPT | Performed by: SURGERY

## 2022-05-28 PROCEDURE — 84100 ASSAY OF PHOSPHORUS: CPT | Performed by: SURGERY

## 2022-05-28 PROCEDURE — 85025 COMPLETE CBC W/AUTO DIFF WBC: CPT | Performed by: SURGERY

## 2022-05-28 DEVICE — IMPLANTABLE DEVICE
Type: IMPLANTABLE DEVICE | Site: BRAIN | Status: FUNCTIONAL
Brand: THINFLAP

## 2022-05-28 DEVICE — IMPLANTABLE DEVICE
Type: IMPLANTABLE DEVICE | Site: BRAIN | Status: FUNCTIONAL
Brand: THINFLAP SYSTEM

## 2022-05-28 RX ORDER — BUPIVACAINE HYDROCHLORIDE AND EPINEPHRINE 5; 5 MG/ML; UG/ML
INJECTION, SOLUTION EPIDURAL; INTRACAUDAL; PERINEURAL AS NEEDED
Status: DISCONTINUED | OUTPATIENT
Start: 2022-05-28 | End: 2022-05-28 | Stop reason: HOSPADM

## 2022-05-28 RX ORDER — SODIUM CHLORIDE 9 MG/ML
100 INJECTION, SOLUTION INTRAVENOUS CONTINUOUS
Status: DISCONTINUED | OUTPATIENT
Start: 2022-05-28 | End: 2022-05-29

## 2022-05-28 RX ORDER — PRAVASTATIN SODIUM 40 MG
40 TABLET ORAL
Refills: 2 | Status: DISCONTINUED | OUTPATIENT
Start: 2022-05-28 | End: 2022-06-06 | Stop reason: HOSPADM

## 2022-05-28 RX ORDER — FENTANYL CITRATE 50 UG/ML
INJECTION, SOLUTION INTRAMUSCULAR; INTRAVENOUS AS NEEDED
Status: DISCONTINUED | OUTPATIENT
Start: 2022-05-28 | End: 2022-05-28

## 2022-05-28 RX ORDER — SODIUM CHLORIDE 9 MG/ML
INJECTION, SOLUTION INTRAVENOUS CONTINUOUS PRN
Status: DISCONTINUED | OUTPATIENT
Start: 2022-05-28 | End: 2022-05-28

## 2022-05-28 RX ORDER — DOCUSATE SODIUM 100 MG/1
100 CAPSULE, LIQUID FILLED ORAL 2 TIMES DAILY
Status: DISCONTINUED | OUTPATIENT
Start: 2022-05-28 | End: 2022-06-02

## 2022-05-28 RX ORDER — MAGNESIUM HYDROXIDE 1200 MG/15ML
LIQUID ORAL AS NEEDED
Status: DISCONTINUED | OUTPATIENT
Start: 2022-05-28 | End: 2022-05-28 | Stop reason: HOSPADM

## 2022-05-28 RX ORDER — PROPOFOL 10 MG/ML
INJECTION, EMULSION INTRAVENOUS AS NEEDED
Status: DISCONTINUED | OUTPATIENT
Start: 2022-05-28 | End: 2022-05-28

## 2022-05-28 RX ORDER — LIDOCAINE HYDROCHLORIDE AND EPINEPHRINE 10; 10 MG/ML; UG/ML
INJECTION, SOLUTION INFILTRATION; PERINEURAL AS NEEDED
Status: DISCONTINUED | OUTPATIENT
Start: 2022-05-28 | End: 2022-05-28 | Stop reason: HOSPADM

## 2022-05-28 RX ORDER — GLYCOPYRROLATE 0.2 MG/ML
INJECTION INTRAMUSCULAR; INTRAVENOUS AS NEEDED
Status: DISCONTINUED | OUTPATIENT
Start: 2022-05-28 | End: 2022-05-28

## 2022-05-28 RX ORDER — SENNOSIDES 8.6 MG
1 TABLET ORAL DAILY
Status: DISCONTINUED | OUTPATIENT
Start: 2022-05-28 | End: 2022-06-02

## 2022-05-28 RX ORDER — EPHEDRINE SULFATE 50 MG/ML
INJECTION INTRAVENOUS AS NEEDED
Status: DISCONTINUED | OUTPATIENT
Start: 2022-05-28 | End: 2022-05-28

## 2022-05-28 RX ORDER — NEOSTIGMINE METHYLSULFATE 1 MG/ML
INJECTION INTRAVENOUS AS NEEDED
Status: DISCONTINUED | OUTPATIENT
Start: 2022-05-28 | End: 2022-05-28

## 2022-05-28 RX ORDER — DEXAMETHASONE SODIUM PHOSPHATE 10 MG/ML
INJECTION, SOLUTION INTRAMUSCULAR; INTRAVENOUS AS NEEDED
Status: DISCONTINUED | OUTPATIENT
Start: 2022-05-28 | End: 2022-05-28

## 2022-05-28 RX ORDER — CEFAZOLIN SODIUM 2 G/50ML
2000 SOLUTION INTRAVENOUS ONCE
Status: COMPLETED | OUTPATIENT
Start: 2022-05-28 | End: 2022-05-28

## 2022-05-28 RX ORDER — LIDOCAINE HYDROCHLORIDE 10 MG/ML
INJECTION, SOLUTION EPIDURAL; INFILTRATION; INTRACAUDAL; PERINEURAL AS NEEDED
Status: DISCONTINUED | OUTPATIENT
Start: 2022-05-28 | End: 2022-05-28

## 2022-05-28 RX ORDER — CHLORHEXIDINE GLUCONATE 0.12 MG/ML
15 RINSE ORAL ONCE
Status: COMPLETED | OUTPATIENT
Start: 2022-05-28 | End: 2022-05-28

## 2022-05-28 RX ORDER — ONDANSETRON 2 MG/ML
4 INJECTION INTRAMUSCULAR; INTRAVENOUS EVERY 6 HOURS PRN
Status: DISCONTINUED | OUTPATIENT
Start: 2022-05-28 | End: 2022-06-06 | Stop reason: HOSPADM

## 2022-05-28 RX ORDER — SODIUM CHLORIDE, SODIUM LACTATE, POTASSIUM CHLORIDE, CALCIUM CHLORIDE 600; 310; 30; 20 MG/100ML; MG/100ML; MG/100ML; MG/100ML
INJECTION, SOLUTION INTRAVENOUS CONTINUOUS PRN
Status: DISCONTINUED | OUTPATIENT
Start: 2022-05-28 | End: 2022-05-28

## 2022-05-28 RX ORDER — ROCURONIUM BROMIDE 10 MG/ML
INJECTION, SOLUTION INTRAVENOUS AS NEEDED
Status: DISCONTINUED | OUTPATIENT
Start: 2022-05-28 | End: 2022-05-28

## 2022-05-28 RX ORDER — CEFAZOLIN SODIUM 2 G/50ML
2000 SOLUTION INTRAVENOUS EVERY 8 HOURS
Status: COMPLETED | OUTPATIENT
Start: 2022-05-28 | End: 2022-05-29

## 2022-05-28 RX ADMIN — SODIUM CHLORIDE, SODIUM GLUCONATE, SODIUM ACETATE, POTASSIUM CHLORIDE, MAGNESIUM CHLORIDE, SODIUM PHOSPHATE, DIBASIC, AND POTASSIUM PHOSPHATE 100 ML/HR: .53; .5; .37; .037; .03; .012; .00082 INJECTION, SOLUTION INTRAVENOUS at 05:12

## 2022-05-28 RX ADMIN — CHLORHEXIDINE GLUCONATE 15 ML: 1.2 SOLUTION ORAL at 09:37

## 2022-05-28 RX ADMIN — PHENYLEPHRINE HYDROCHLORIDE 30 MCG/MIN: 10 INJECTION INTRAVENOUS at 10:04

## 2022-05-28 RX ADMIN — SODIUM CHLORIDE 100 ML/HR: 0.9 INJECTION, SOLUTION INTRAVENOUS at 12:25

## 2022-05-28 RX ADMIN — EPHEDRINE SULFATE 5 MG: 50 INJECTION INTRAVENOUS at 10:38

## 2022-05-28 RX ADMIN — NEOSTIGMINE METHYLSULFATE 3 MG: 1 INJECTION INTRAVENOUS at 11:41

## 2022-05-28 RX ADMIN — PRAVASTATIN SODIUM 40 MG: 40 TABLET ORAL at 15:59

## 2022-05-28 RX ADMIN — SODIUM CHLORIDE, SODIUM LACTATE, POTASSIUM CHLORIDE, AND CALCIUM CHLORIDE: .6; .31; .03; .02 INJECTION, SOLUTION INTRAVENOUS at 09:42

## 2022-05-28 RX ADMIN — ONDANSETRON 4 MG: 2 INJECTION INTRAMUSCULAR; INTRAVENOUS at 10:55

## 2022-05-28 RX ADMIN — DOCUSATE SODIUM 100 MG: 100 CAPSULE, LIQUID FILLED ORAL at 17:03

## 2022-05-28 RX ADMIN — SODIUM CHLORIDE 100 ML/HR: 0.9 INJECTION, SOLUTION INTRAVENOUS at 21:58

## 2022-05-28 RX ADMIN — CEFAZOLIN SODIUM 2000 MG: 2 SOLUTION INTRAVENOUS at 17:03

## 2022-05-28 RX ADMIN — LEVETIRACETAM 500 MG: 500 TABLET, FILM COATED ORAL at 07:43

## 2022-05-28 RX ADMIN — ROCURONIUM BROMIDE 50 MG: 50 INJECTION, SOLUTION INTRAVENOUS at 09:54

## 2022-05-28 RX ADMIN — LEVETIRACETAM 500 MG: 500 TABLET, FILM COATED ORAL at 21:58

## 2022-05-28 RX ADMIN — PROPOFOL 80 MG: 10 INJECTION, EMULSION INTRAVENOUS at 09:54

## 2022-05-28 RX ADMIN — ACETAMINOPHEN 650 MG: 325 TABLET, FILM COATED ORAL at 15:59

## 2022-05-28 RX ADMIN — EPHEDRINE SULFATE 5 MG: 50 INJECTION INTRAVENOUS at 10:04

## 2022-05-28 RX ADMIN — FENTANYL CITRATE 100 MCG: 50 INJECTION, SOLUTION INTRAMUSCULAR; INTRAVENOUS at 09:54

## 2022-05-28 RX ADMIN — GLYCOPYRROLATE 0.4 MCG: 0.2 INJECTION, SOLUTION INTRAMUSCULAR; INTRAVENOUS at 11:41

## 2022-05-28 RX ADMIN — SODIUM CHLORIDE: 9 INJECTION, SOLUTION INTRAVENOUS at 10:00

## 2022-05-28 RX ADMIN — LIDOCAINE HYDROCHLORIDE 50 MG: 10 INJECTION, SOLUTION EPIDURAL; INFILTRATION; INTRACAUDAL at 09:54

## 2022-05-28 RX ADMIN — CEFAZOLIN SODIUM 2000 MG: 2 SOLUTION INTRAVENOUS at 09:47

## 2022-05-28 RX ADMIN — LEVETIRACETAM 500 MG: 100 INJECTION INTRAVENOUS at 10:08

## 2022-05-28 RX ADMIN — DEXAMETHASONE SODIUM PHOSPHATE 10 MG: 10 INJECTION, SOLUTION INTRAMUSCULAR; INTRAVENOUS at 09:54

## 2022-05-28 NOTE — OP NOTE
OPERATIVE REPORT  PATIENT NAME: Mihai Gutierrez    :  1940  MRN: [id number]  Pt Location: [facility] OR ROOM 17    SURGERY DATE: 2022    Surgeon(s) and Role:     * Gayatri Damico MD - Primary    Preop Diagnosis:  Subdural hematoma ([address] ) [S06 5X9A]    Post-Op Diagnosis Codes:     * Subdural hematoma ([address] ) [I30 5Q5R]    Procedure(s) (LRB):  left CRANIOTOMY FOR SUBDURAL HEMATOMA (Left)    Specimen(s):  ID Type Source Tests Collected by Time Destination   1 : Left subdural membranes Tissue Brain TISSUE EXAM Gayatri Damico MD 2022 1048        Estimated Blood Loss:   Minimal    Drains:  Ventriculostomy/Subdural Subdural drainage catheter Left Frontal region (Active)   Drain Status Capped 22 0005   Number of days: 0       Ventriculostomy/Subdural Subdural drainage catheter Left Parietal region (Active)   Drain Status Capped 22 0005   Number of days: 0       Anesthesia Type:   General    Operative Indications:  Subdural hematoma ([address] ) [S06 5X9A]      Operative Findings:  Multiple subdural membranes    Complications:   None    Procedure and Technique:  After adequate general endotracheal anesthesia the patient was placed supine on the operating table  The head was turned slightly to the right a bolster was placed under the left shoulder  The hair was clipped and the scalp was prepped with Betadine soap then DuraPrep  Double layer drapes were placed in normal fashion and a 3M Betadine impregnated sticky drape was placed over these  A time-out was called and all parameters a time-out were followed  The skin in the left frontal and parietal region was injected with 1% lidocaine with 1 100,000 epinephrine  15  Blade was used to incise the skin  Bovie and bipolar were used throughout the procedure to maintain hemostasis  The Bovie and a cutting setting was used divide the tissues to the underlying para cranium    The temporalis fascia and muscle were incised and elevated from the underlying bone with the use of the Bovie  The Midas-Zbigniew drill with a 3 0 match stick was utilized to form a bur hole inferiorly and a 2nd 1 superiorly  A full craniotomy flap was turned  The dura was not breached in this maneuver  The dura was then opened in a cruciate fashion 4-0 Nurolon sutures were used to maintain operative exposure  A large amount of subdural fluid egress under high pressure  Multiple membranes were identified and these were opened widely  To 1 9 mm inside diameter bactoseal catheters were placed and tunneled inferiorly through separate incisions  These were secured in to place with the available bracket  Copious quantities of irrigation were used to cleanse out the region  The dura was then approximated with interrupted then running 4-0 Nurolon suture  Small piece of Gelfoam was placed over this  The bone flap was replaced with the Biomet bone fixation system  The temporalis fascia and muscle were approximated with interrupted 2 0 Vicryl suture  The scalp was closed with interrupted inverted to 0 Vicryl suture in the galea and a running 4-0 Monocryl in the skin  Histoacryl was placed  The patient was then extubated and taken to the intensive care unit having tolerated the procedure well     I was present for the entire procedure    Patient Disposition:  PACU       SIGNATURE: Denny Cantor MD  DATE: May 28, 2022  TIME: 11:56 AM

## 2022-05-28 NOTE — PROGRESS NOTES
Critical Care Interval Progress Note - Post-Op Check     Sonu Higgins 79 y o  male MRN: [id number]  Unit/Bed#: ICU 11 Encounter: [id number]    Subjective and Objective:  POD #0 s/p left craniotomy, frontal and parietal subdural drain placement  S/p 2u platelets     Physical Exam  Vitals reviewed  Constitutional:       General: He is not in acute distress  Appearance: Normal appearance  HENT:      Head:     Eyes:      Conjunctiva/sclera: Conjunctivae normal       Pupils: Pupils are equal, round, and reactive to light  Comments: Possible slight right lid ptosis    Cardiovascular:      Rate and Rhythm: Normal rate and regular rhythm  Pulses:           Radial pulses are 2+ on the right side and 2+ on the left side  Dorsalis pedis pulses are 2+ on the right side and 2+ on the left side  Heart sounds: Normal heart sounds  Pulmonary:      Effort: Pulmonary effort is normal       Breath sounds: Normal breath sounds  Abdominal:      General: There is no distension  Palpations: Abdomen is soft  Tenderness: There is no abdominal tenderness  Musculoskeletal:      Cervical back: Full passive range of motion without pain  Skin:     General: Skin is warm and dry  Capillary Refill: Capillary refill takes less than 2 seconds  Neurological:      Mental Status: He is easily aroused  He is lethargic  GCS: GCS eye subscore is 4  GCS verbal subscore is 1  GCS motor subscore is 4  Motor: No tremor  Comments: Difficult to assess due to deafness  Will not reliably track with eyes in order to repeat visual commands  Spontaneously moves all 4 extremities  Possible 4+ strength noted in RLE and right  strength, although not able to reliably reproduce exam  Withdrawal to pain in all extremities          Assessment and Plan:  Principal Problem:    SDH (subdural hematoma) (HCC)  Resolved Problems:    * No resolved hospital problems   *      · Q1h neuro checks  · STAT CT head for decrease in GCS > 2 in 1 horu  · Maintain SDD x2 at 0cm  · Monitor drain output amount and character  · CT head in AM  · Normal saline 100ml/hr  · Resume oral diet when more awake/approprieate  · Continue to hold all ATP/ATC    RONY Mitchell      Portions of the record may have been created with voice recognition software  Occasional wrong word or "sound a like" substitutions may have occurred due to the inherent limitations of voice recognition software    Read the chart carefully and recognize, using context, where substitutions have occurred

## 2022-05-28 NOTE — ANESTHESIA POSTPROCEDURE EVALUATION
Post-Op Assessment Note    CV Status:  Stable  Pain Score: 0    Pain management: adequate     Mental Status:  Alert and awake   Hydration Status:  Euvolemic   PONV Controlled:  Controlled   Airway Patency:  Patent   Two or more mitigation strategies used for obstructive sleep apnea   Post Op Vitals Reviewed: Yes      Staff: CRNA         No complications documented      BP   140/77   Temp   97 4   Pulse  85   Resp   20   SpO2   98%

## 2022-05-28 NOTE — ANESTHESIA PREPROCEDURE EVALUATION
Procedure:  left CRANIOTOMY FOR SUBDURAL HEMATOMA (Left Head)    Relevant Problems   CARDIO   (+) Hypercholesterolemia      MUSCULOSKELETAL   (+) Primary osteoarthritis of left knee      NEURO/PSYCH   (+) 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HCC)   (+) SDH (subdural hematoma) (HCC)      Other   (+) Borderline hypertension        Physical Exam    Airway    Mallampati score: II  TM Distance: >3 FB  Neck ROM: full     Dental   Comment: Chipped and missing teeth,     Cardiovascular  Rhythm: regular, Rate: normal, Cardiovascular exam normal    Pulmonary  Pulmonary exam normal Breath sounds clear to auscultation,     Other Findings        Anesthesia Plan  ASA Score- 2 Emergent    Anesthesia Type- general with ASA Monitors  Additional Monitors:   Airway Plan: ETT  Plan Factors-Exercise tolerance (METS): >4 METS  Chart reviewed  EKG reviewed  Existing labs reviewed  Patient summary reviewed  Patient is not a current smoker  Induction- intravenous  Postoperative Plan- Plan for postoperative opioid use  Planned trial extubation    Informed Consent- Anesthetic plan and risks discussed with patient  I personally reviewed this patient with the CRNA  Discussed and agreed on the Anesthesia Plan with the CRNA  Jacob Pac

## 2022-05-28 NOTE — PROGRESS NOTES
Daily Progress Note - Critical Care   Darion Galo 79 y o  male MRN: [id number]  Unit/Bed#: ICU 11 Encounter: [id number]        ----------------------------------------------------------------------------------------  HPI/24hr events: no acute events overnigth    ---------------------------------------------------------------------------------------  SUBJECTIVE  No complaints this morning  Denies weakness  Review of Systems   All other systems reviewed and are negative  Review of systems was reviewed and negative unless stated above in HPI/24-hour events   ---------------------------------------------------------------------------------------  Assessment and Plan:    Neuro:   · Diagnosis: Acute on chronic SDH  · CTH: increased size and density of of L subdural collection with mass effect and midline shift  Mild effacement of L lateral ventricle  ? Plan:  § OR with Neurosurgery for SDH evacuation  § Will need ICU post-operatively  § Q1 hr neuro checks  § Delirium percautions  § CAM ICU  § F/u MRI head and neck    CV:   · Diagnosis: No acute issues  ? Plan:   § Continuous cardiac monitoring  § Maintain MAPs over 65  § Hx of HLD-continue statin           Pulm:  · Diagnosis: No acute issues  ? Plan:   § Maintain sats greater than 92%        GI:   · Diagnosis: No acute issues  ? Plan:   § NPO sips with meds  § Will hold on GI px for now          : normal BUN/Cr  · Diagnosis: No acute issues  ? Plan:   § Monitor BUN and creatinine  § Strict I's and O's         F/E/N:   · Plan:   ? Fluids:  Isolyte @100  ? Electrolytes; WNL; continue to monitor  ? Nutrition: NPO, sips with meds        Heme/Onc:   · Diagnosis: no acute issues  ? Plan:   § Monitor Hbg  § Hold chemical DVT px        Endo:   · Diagnosis: No acute issues  ? Plan:   § Maintain Normoglycemia        ID:   · Diagnosis: No acute issues  ? Plan:   § No leukocytosis        MSK/Skin:   · Diagnosis: No acute issues  ?  Plan:  § Encourage OOB to chair and early mobilization     Patient appropriate for transfer out of the ICU today?: No  Disposition: Continue Critical Care   Code Status: Level 1 - Full Code  ---------------------------------------------------------------------------------------  ICU CORE MEASURES    Prophylaxis   VTE Pharmacologic Prophylaxis: Pharmacologic VTE Prophylaxis contraindicated due to SDH  VTE Mechanical Prophylaxis: sequential compression device  Stress Ulcer Prophylaxis: Prophylaxis Not Indicated     ABCDE Protocol (if indicated)  Plan to perform spontaneous awakening trial today? Not applicable  Plan to perform spontaneous breathing trial today? Not applicable  Obvious barriers to extubation? Not applicable  CAM-ICU: Negative    Invasive Devices Review  Invasive Devices  Report    Peripheral Intravenous Line  Duration           Peripheral IV 22 Left Arm <1 day              Can any invasive devices be discontinued today? No  ---------------------------------------------------------------------------------------  OBJECTIVE    Vitals   Vitals:    22 2130 22 2215 22 2315 22 0015   BP:  109/66 122/62 119/70   BP Location:       Pulse:  70 76 74   Resp:  15  18   Temp:    98 1 °F (36 7 °C)   TempSrc:    Oral   SpO2:  96% 96% 94%   Weight: 78 8 kg (173 lb 11 6 oz)        Temp (24hrs), Av 2 °F (36 8 °C), Min:97 7 °F (36 5 °C), Max:98 7 °F (37 1 °C)  Current: Temperature: 98 1 °F (36 7 °C)    Respiratory:  SpO2: SpO2: 94 %       Invasive/non-invasive ventilation settings   Respiratory  Report   Lab Data (Last 4 hours)    None         O2/Vent Data (Last 4 hours)    None                Physical Exam  Vitals reviewed  Constitutional:       General: He is not in acute distress  Appearance: He is not ill-appearing, toxic-appearing or diaphoretic  HENT:      Head: Normocephalic and atraumatic  Eyes:      Extraocular Movements: Extraocular movements intact     Cardiovascular:      Rate and Rhythm: Normal rate    Pulmonary:      Effort: Pulmonary effort is normal  No respiratory distress  Abdominal:      General: There is no distension  Palpations: Abdomen is soft  Tenderness: There is no abdominal tenderness  There is no guarding or rebound  Musculoskeletal:      Right lower leg: No edema  Left lower leg: No edema  Skin:     General: Skin is warm and dry  Neurological:      Mental Status: He is alert and oriented to person, place, and time  Mental status is at baseline  Comments: Moving all ext  Psychiatric:         Mood and Affect: Mood normal              Laboratory and Diagnostics:  Results from last 7 days   Lab Units 05/27/22  1600   WBC Thousand/uL 5 73   HEMOGLOBIN g/dL 14 2   HEMATOCRIT % 42 6   PLATELETS Thousands/uL 380   NEUTROS PCT % 66   MONOS PCT % 11     Results from last 7 days   Lab Units 05/27/22  1600   SODIUM mmol/L 139   POTASSIUM mmol/L 4 2   CHLORIDE mmol/L 107   CO2 mmol/L 25   ANION GAP mmol/L 7   BUN mg/dL 15   CREATININE mg/dL 0 78   CALCIUM mg/dL 9 0   GLUCOSE RANDOM mg/dL 94   ALT U/L 30   AST U/L 18   ALK PHOS U/L 79   ALBUMIN g/dL 3 6   TOTAL BILIRUBIN mg/dL 0 52          Results from last 7 days   Lab Units 05/27/22  1600   INR  1 06   PTT seconds 30              ABG:    VBG:          Micro        EKG: NA  Imaging:    XR chest 1 view portable    (Results Pending)   MRA carotids wo contrast    (Results Pending)   MRA head wo contrast    (Results Pending)       Intake and Output  I/O       05/26 0701 05/27 0700 05/27 0701  05/28 0700    I V  (mL/kg)  375 (4 8)    Total Intake(mL/kg)  375 (4 8)    Net  +375                  Height and Weights         Body mass index is 24 23 kg/m²    Weight (last 2 days)     Date/Time Weight    05/27/22 2130 78 8 (173 72)            Nutrition       Diet Orders   (From admission, onward)             Start     Ordered    05/28/22 0001  Diet NPO; Sips with meds  Diet effective midnight        References:    Nutrtion Support Algorithm Enteral vs  Parenteral   Question Answer Comment   Diet Type NPO    NPO Except: Sips with meds    RD to adjust diet per protocol? No        05/27/22 1617                    Active Medications  Scheduled Meds:  Current Facility-Administered Medications   Medication Dose Route Frequency Provider Last Rate    acetaminophen  650 mg Oral Q6H PRN Juleen Marking, MD      HYDROmorphone  0 5 mg Intravenous Q4H PRN Juleen Marking, MD      levETIRAcetam  500 mg Oral Q12H [address] Sonia Harris, MD      multi-electrolyte  100 mL/hr Intravenous Continuous Sonia Harris,  mL/hr (05/27/22 2015)    ondansetron  4 mg Intravenous Q6H PRN Juleen Marking, MD      oxyCODONE  10 mg Oral Q4H PRN Juleen Marking, MD      oxyCODONE  5 mg Oral Q4H PRN Juleen Marking, MD      polyethylene glycol  17 g Oral Daily Sonia Harris MD       Continuous Infusions:  multi-electrolyte, 100 mL/hr, Last Rate: 100 mL/hr (05/27/22 2015)      PRN Meds:   acetaminophen, 650 mg, Q6H PRN  HYDROmorphone, 0 5 mg, Q4H PRN  ondansetron, 4 mg, Q6H PRN  oxyCODONE, 10 mg, Q4H PRN  oxyCODONE, 5 mg, Q4H PRN        Allergies   No Known Allergies  ---------------------------------------------------------------------------------------  Advance Directive and Living Will:      Power of :    POLST:    ---------------------------------------------------------------------------------------  Care Time Delivered:       Sonia Harris MD      Portions of the record may have been created with voice recognition software  Occasional wrong word or "sound a like" substitutions may have occurred due to the inherent limitations of voice recognition software    Read the chart carefully and recognize, using context, where substitutions have occurred

## 2022-05-28 NOTE — PROGRESS NOTES
Patient seen and examined  Briefly this is an 79year old who has sustained multiple falls and consumes ibuprofen for arthritic pain    Under watchful waiting for his known subdural hematoma there is noted a large increase in the size of the subdural with evidence of recent bleed  His neurological status is unchanghed and intact save for a prestanding [place] which is believed to be environmentally produced          Plan[de-identified]  Left frontal craniotomy for evacuation of SDH and membrane stripping  MMA on the left consideration by Sharmaine  Reversal of antiplatelet effect with DDAVP and plt transfusion

## 2022-05-28 NOTE — PLAN OF CARE
Problem: Potential for Falls  Goal: Patient will remain free of falls  Description: INTERVENTIONS:  - Educate patient/family on patient safety including physical limitations  - Instruct patient to call for assistance with activity   - Consult OT/PT to assist with strengthening/mobility   - Keep Call bell within reach  - Keep bed low and locked with side rails adjusted as appropriate  - Keep care items and personal belongings within reach  - Initiate and maintain comfort rounds  - Make Fall Risk Sign visible to staff  - Offer Toileting every 2 Hours, in advance of need  - Initiate/Maintain bed/chair alarm  - Obtain necessary fall risk management equipment: N/A  Problem: MOBILITY - ADULT  Goal: Maintain or return to baseline ADL function  Description: INTERVENTIONS:  -  Assess patient's ability to carry out ADLs; assess patient's baseline for ADL function and identify physical deficits which impact ability to perform ADLs (bathing, care of mouth/teeth, toileting, grooming, dressing, etc )  - Assess/evaluate cause of self-care deficits   - Assess range of motion  - Assess patient's mobility; develop plan if impaired  - Assess patient's need for assistive devices and provide as appropriate  - Encourage maximum independence but intervene and supervise when necessary  - Involve family in performance of ADLs  - Assess for home care needs following discharge   - Consider OT consult to assist with ADL evaluation and planning for discharge  - Provide patient education as appropriate  Outcome: Progressing  Goal: Maintains/Returns to pre admission functional level  Description: INTERVENTIONS:  - Perform BMAT or MOVE assessment daily    - Set and communicate daily mobility goal to care team and patient/family/caregiver     - Collaborate with rehabilitation services on mobility goals if consulted  Problem: Prexisting or High Potential for Compromised Skin Integrity  Goal: Skin integrity is maintained or improved  Description: INTERVENTIONS:  - Identify patients at risk for skin breakdown  - Assess and monitor skin integrity  - Assess and monitor nutrition and hydration status  - Monitor labs   - Assess for incontinence   - Turn and reposition patient  - Assist with mobility/ambulation  - Relieve pressure over bony prominences  - Avoid friction and shearing  - Provide appropriate hygiene as needed including keeping skin clean and dry  - Evaluate need for skin moisturizer/barrier cream  - Collaborate with interdisciplinary team   - Patient/family teaching  - Consider wound care consult   Outcome: Progressing     - Out of bed for toileting  - Record patient progress and toleration of activity level   Outcome: Progressing     - Apply yellow socks and bracelet for high fall risk patients  - Consider moving patient to room near nurses station  Outcome: Progressing

## 2022-05-29 ENCOUNTER — APPOINTMENT (INPATIENT)
Dept: RADIOLOGY | Facility: HOSPITAL | Age: 82
DRG: 025 | End: 2022-05-29
Payer: MEDICARE

## 2022-05-29 LAB
ABO GROUP BLD BPU: NORMAL
ANION GAP SERPL CALCULATED.3IONS-SCNC: 6 MMOL/L (ref 4–13)
ANION GAP SERPL CALCULATED.3IONS-SCNC: 6 MMOL/L (ref 4–13)
APTT PPP: 28 SECONDS (ref 23–37)
ATRIAL RATE: 326 BPM
BPU ID: NORMAL
BUN SERPL-MCNC: 13 MG/DL (ref 5–25)
BUN SERPL-MCNC: 15 MG/DL (ref 5–25)
CA-I BLD-SCNC: 1.03 MMOL/L (ref 1.12–1.32)
CALCIUM SERPL-MCNC: 8.8 MG/DL (ref 8.3–10.1)
CALCIUM SERPL-MCNC: 9.4 MG/DL (ref 8.3–10.1)
CHLORIDE SERPL-SCNC: 105 MMOL/L (ref 100–108)
CHLORIDE SERPL-SCNC: 106 MMOL/L (ref 100–108)
CO2 SERPL-SCNC: 23 MMOL/L (ref 21–32)
CO2 SERPL-SCNC: 28 MMOL/L (ref 21–32)
CREAT SERPL-MCNC: 0.61 MG/DL (ref 0.6–1.3)
CREAT SERPL-MCNC: 0.79 MG/DL (ref 0.6–1.3)
CROSSMATCH: NORMAL
ERYTHROCYTE [DISTWIDTH] IN BLOOD BY AUTOMATED COUNT: 13 % (ref 11.6–15.1)
GFR SERPL CREATININE-BSD FRML MDRD: 83 ML/MIN/1.73SQ M
GFR SERPL CREATININE-BSD FRML MDRD: 92 ML/MIN/1.73SQ M
GLUCOSE SERPL-MCNC: 104 MG/DL (ref 65–140)
GLUCOSE SERPL-MCNC: 105 MG/DL (ref 65–140)
HCT VFR BLD AUTO: 35.5 % (ref 36.5–49.3)
HGB BLD-MCNC: 12 G/DL (ref 12–17)
INR PPP: 1.1 (ref 0.84–1.19)
MAGNESIUM SERPL-MCNC: 2.2 MG/DL (ref 1.6–2.6)
MCH RBC QN AUTO: 32.3 PG (ref 26.8–34.3)
MCHC RBC AUTO-ENTMCNC: 33.8 G/DL (ref 31.4–37.4)
MCV RBC AUTO: 95 FL (ref 82–98)
PLATELET # BLD AUTO: 375 THOUSANDS/UL (ref 149–390)
PMV BLD AUTO: 8.9 FL (ref 8.9–12.7)
POTASSIUM SERPL-SCNC: 3.9 MMOL/L (ref 3.5–5.3)
POTASSIUM SERPL-SCNC: 4.2 MMOL/L (ref 3.5–5.3)
PROTHROMBIN TIME: 13.7 SECONDS (ref 11.6–14.5)
QRS AXIS: 54 DEGREES
QRSD INTERVAL: 132 MS
QT INTERVAL: 432 MS
QTC INTERVAL: 452 MS
RBC # BLD AUTO: 3.72 MILLION/UL (ref 3.88–5.62)
SODIUM SERPL-SCNC: 135 MMOL/L (ref 136–145)
SODIUM SERPL-SCNC: 139 MMOL/L (ref 136–145)
T WAVE AXIS: 45 DEGREES
UNIT DISPENSE STATUS: NORMAL
UNIT PRODUCT CODE: NORMAL
UNIT PRODUCT VOLUME: 300 ML
UNIT PRODUCT VOLUME: 300 ML
UNIT PRODUCT VOLUME: 350 ML
UNIT RH: NORMAL
VENTRICULAR RATE: 66 BPM
WBC # BLD AUTO: 9.14 THOUSAND/UL (ref 4.31–10.16)

## 2022-05-29 PROCEDURE — 85730 THROMBOPLASTIN TIME PARTIAL: CPT | Performed by: NURSE PRACTITIONER

## 2022-05-29 PROCEDURE — 82330 ASSAY OF CALCIUM: CPT | Performed by: NURSE PRACTITIONER

## 2022-05-29 PROCEDURE — 99024 POSTOP FOLLOW-UP VISIT: CPT | Performed by: NEUROLOGICAL SURGERY

## 2022-05-29 PROCEDURE — 99291 CRITICAL CARE FIRST HOUR: CPT | Performed by: STUDENT IN AN ORGANIZED HEALTH CARE EDUCATION/TRAINING PROGRAM

## 2022-05-29 PROCEDURE — 93010 ELECTROCARDIOGRAM REPORT: CPT | Performed by: INTERNAL MEDICINE

## 2022-05-29 PROCEDURE — 85027 COMPLETE CBC AUTOMATED: CPT | Performed by: NURSE PRACTITIONER

## 2022-05-29 PROCEDURE — 70547 MR ANGIOGRAPHY NECK W/O DYE: CPT

## 2022-05-29 PROCEDURE — 80048 BASIC METABOLIC PNL TOTAL CA: CPT | Performed by: NURSE PRACTITIONER

## 2022-05-29 PROCEDURE — 97163 PT EVAL HIGH COMPLEX 45 MIN: CPT

## 2022-05-29 PROCEDURE — 85610 PROTHROMBIN TIME: CPT | Performed by: NURSE PRACTITIONER

## 2022-05-29 PROCEDURE — 83735 ASSAY OF MAGNESIUM: CPT | Performed by: NURSE PRACTITIONER

## 2022-05-29 PROCEDURE — 70544 MR ANGIOGRAPHY HEAD W/O DYE: CPT

## 2022-05-29 PROCEDURE — 97167 OT EVAL HIGH COMPLEX 60 MIN: CPT

## 2022-05-29 PROCEDURE — G1004 CDSM NDSC: HCPCS

## 2022-05-29 RX ORDER — CALCIUM GLUCONATE 20 MG/ML
1 INJECTION, SOLUTION INTRAVENOUS ONCE
Status: COMPLETED | OUTPATIENT
Start: 2022-05-29 | End: 2022-05-29

## 2022-05-29 RX ADMIN — CEFAZOLIN SODIUM 2000 MG: 2 SOLUTION INTRAVENOUS at 02:11

## 2022-05-29 RX ADMIN — LEVETIRACETAM 500 MG: 500 TABLET, FILM COATED ORAL at 20:24

## 2022-05-29 RX ADMIN — LEVETIRACETAM 500 MG: 500 TABLET, FILM COATED ORAL at 07:54

## 2022-05-29 RX ADMIN — OXYCODONE HYDROCHLORIDE 5 MG: 5 TABLET ORAL at 20:24

## 2022-05-29 RX ADMIN — CEFAZOLIN SODIUM 2000 MG: 2 SOLUTION INTRAVENOUS at 07:55

## 2022-05-29 RX ADMIN — OXYCODONE HYDROCHLORIDE 5 MG: 5 TABLET ORAL at 14:07

## 2022-05-29 RX ADMIN — PRAVASTATIN SODIUM 40 MG: 40 TABLET ORAL at 17:13

## 2022-05-29 RX ADMIN — DOCUSATE SODIUM 100 MG: 100 CAPSULE, LIQUID FILLED ORAL at 17:13

## 2022-05-29 RX ADMIN — ACETAMINOPHEN 650 MG: 325 TABLET, FILM COATED ORAL at 04:07

## 2022-05-29 RX ADMIN — SENNOSIDES 8.6 MG: 8.6 TABLET, FILM COATED ORAL at 07:54

## 2022-05-29 RX ADMIN — CALCIUM GLUCONATE 1 G: 20 INJECTION, SOLUTION INTRAVENOUS at 10:33

## 2022-05-29 RX ADMIN — DOCUSATE SODIUM 100 MG: 100 CAPSULE, LIQUID FILLED ORAL at 07:55

## 2022-05-29 NOTE — PROGRESS NOTES
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 and therefore he is not a candidate for and embolization of this    He is much more engaging with an external hearing amplification device  Plan:  Continue present drainage    Continue with external hearing application    CT scan of the brain in the a m  VTE Prophylaxis: Sequential compression device Emily Hernandezon)     Subjective/Objective   Chief Complaint:  I am okay  He complains of no headaches  He is sitting comfortably in a chair  He is much more engaged and communicative given his hearing  Vitals: Blood pressure 102/60, pulse 70, temperature 97 8 °F (36 6 °C), resp  rate 21, height 5' 11" (1 803 m), weight 78 8 kg (173 lb 11 6 oz), SpO2 96 %  ,Body mass index is 24 23 kg/m²      Hemodynamic Monitoring: MAP:      Physical Exam:   Awake alert and oriented  His speech is clear but with a dysarthric component to his speech typical of poor auditory feedback speech  His a very mild right hemiparesis  He is ambulatory with assistance  His dressings and drains are intact    Intake/Output                 05/29/22 0701 - 05/30/22 0700     [id number] Total              Intake    P O   200  -- 200    I V   400  -- 400    IV Piggyback  100  -- 100    Total Intake 700 -- 700       Output    Urine  400  -- 400    Urine 400 -- 400    Total Output 400 -- 400       Net I/O     300 -- 300          Invasive Devices  Report    Peripheral Intravenous Line  Duration           Peripheral IV 05/27/22 Left Arm 1 day    Peripheral IV 05/28/22 Right Hand 1 day          Drain  Duration           Ventriculostomy/Subdural Subdural drainage catheter Left Frontal region 1 day Ventriculostomy/Subdural Subdural drainage catheter Left Parietal region 1 day              Subdural drains are now draining much better than they had previously  There is 10 cc in the anterior drain and 30 cc in the posterior 1  Lab Results:   Lab Results   Component Value Date    WBC 9 14 05/29/2022    HGB 12 0 05/29/2022    HCT 35 5 (L) 05/29/2022    MCV 95 05/29/2022     05/29/2022    MCH 32 3 05/29/2022    MCHC 33 8 05/29/2022    RDW 13 0 05/29/2022    MPV 8 9 05/29/2022    SODIUM 135 (L) 05/29/2022     05/29/2022    CO2 23 05/29/2022    BUN 13 05/29/2022    CREATININE 0 61 05/29/2022    CALCIUM 8 8 05/29/2022    EGFR 92 05/29/2022    INR 1 10 05/29/2022       Imaging Studies: I have personally reviewed pertinent films in PACS  MRA is carefully reviewed  I also spoke with Dr Gómez Graf cell can regarding this    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

## 2022-05-29 NOTE — PHYSICAL THERAPY NOTE
PHYSICAL THERAPY EVALUATION  NAME:  Estefany Morales  DATE: 05/29/22    AGE:   79 y o  Mrn:   [id number]  ADMIT DX:  Subdural hematoma ([address] ) [S06 [id number]]  Illness, unspecified [R69]    Past Medical History:   Diagnosis Date    Arthritis     Encounter for general adult medical examination without abnormal findings 3/20/2019    Hyperlipidemia        Past Surgical History:   Procedure Laterality Date    HERNIA REPAIR Right     inguinal    [place] REVISE MEDIAN N/CARPAL TUNNEL SURG Right 9/20/2021    Procedure: RELEASE CARPAL TUNNEL;  Surgeon: Kierra Villa MD;  Location: [facility];  Service: Orthopedics       Length Of Stay: 2    PHYSICAL THERAPY EVALUATION:        05/29/22 1111   Note Type   Note type Evaluation   Pain Assessment   Pain Assessment Tool 0-10   Pain Score No Pain   Restrictions/Precautions   Weight Bearing Precautions Per Order No   Other Precautions Chair Alarm; Bed Alarm;Multiple lines; Fall Risk;Hard of hearing  (2 subdural drains, pocket talker)   Home Living   Type of [address] [place] [address] Multi-level;Bed/bath upstairs; Able to live on main level with bedroom/bathroom;1/2 bath on main level;Stairs to enter with rails  (1 HATTIE)   Home Equipment Cane   Additional Comments Patient reports living with spouse  Patient's spouse present and states she is able to assist patient if needed    Patient also has daughter who can assist as well   Prior Function   Level of [place] Independent with ADLs and functional mobility   Lives With Spouse   Receives Help From Family   ADL Assistance Independent   Falls in the last 6 months 1 to 4  (2 per family)   Comments Patient denies use of an assistive device for ambulation prior to admission   General   Family/Caregiver Present Yes  (spouse, daughter)   Cognition   Arousal/Participation Alert   Attention Attends with cues to redirect   Orientation Level Oriented X4   Memory Decreased recall of precautions   Following Commands Follows one step commands with increased time or repetition   RUE Assessment   RUE Assessment X   LUE Assessment   LUE Assessment WFL   RLE Assessment   RLE Assessment X  (decreased strength throughout)   Strength RLE   RLE Overall Strength 3+/5   LLE Assessment   LLE Assessment WFL   Strength LLE   LLE Overall Strength 4/5   Bed Mobility   Additional Comments NA, Pt OOB in chair at time of PT eval   Transfers   Sit to Stand 3  Moderate assistance   Additional items Assist x 1; Increased time required;Verbal cues   Stand to Sit 3  Moderate assistance   Additional items Assist x 1; Increased time required;Verbal cues   Additional Comments VC and TC needed for hand placement during transfers   Ambulation/Elevation   Gait pattern Excessively slow; Short stride; Foward flexed; Inconsistent savana;Decreased foot clearance;Decreased R stance  (physical assistance required to advance RLE)   Gait Assistance 3  Moderate assist   Additional items Assist x 1   Assistive Device Rolling walker   Distance 15ft   Balance   Static Sitting Fair   Static Standing Poor +   Ambulatory Poor   Endurance Deficit   Endurance Deficit Yes   Endurance Deficit Description fatigue, weakness   Activity Tolerance   Activity Tolerance Patient limited by fatigue  (weakness)   Medical Staff Made Aware Jackson, OT; OT present for co evaluation due to pts current medical presentation and decreased activity tolerance which all impact pts overall physical performance   Nurse Made Aware Patient appropriate to be seen and mobilized per nursing   Assessment   Prognosis Good   Problem List Decreased strength;Decreased endurance; Impaired balance;Decreased mobility; Decreased safety awareness; Impaired hearing   Assessment Pt is 79 y o  male seen for PT evaluation s/p admit to [facility] on [facility]/27/2022  Two pt identifiers were used to confirm  Pt presented w/ concern for worsening subdural hematoma  Patient initially sustained a fall and head injury in April    Patient had follow-up CT head which showed increased size of subdural and patient was admitted to hospital   Pt was admitted with a primary dx of:  Subdural hematoma s/p left CRANIOTOMY FOR SUBDURAL HEMATOMA (Left) which was performed on 05/28/2022   PT now consulted for assessment of mobility and d/c needs  Pt with Out of bed orders  Pts current co morbidities affecting treatment include:  Arthritis, HLD, and personal factors including steps to manage at home  Pts current clinical presentation is Unstable/ Unpredictable (high complexity) due to Ongoing medical management for primary dx, Increased reliance on more restrictive AD compared to baseline, Decreased activity tolerance compared to baseline, Fall risk, Increased assistance needed from caregiver at current time, Continuous pulse oximetry monitoring , s/p surgical intervention, subdural drains at current time   Upon evaluation, pt currently is requiring  Mod Ax1 for transfers and Mod Ax1 for ambulation w/ RW  Pt presents at PT eval functioning below baseline and currently w/ overall mobility deficits 2* to: BLE weakness, impaired balance, decreased endurance, gait deviations, decreased activity tolerance compared to baseline, decreased safety awareness, fall risk  Pt currently at a fall risk 2* to impairments listed above  Based on the aforementioned PT evaluation, pt will continue to benefit from skilled Acute PT interventions to address stated impairments; to maximize functional mobility; for ongoing pt/ family training; and DME needs  At conclusion of PT session pt returned back in chair and chair alarm engaged with phone and call bell within reach  Pt denies any further questions at this time  PT is currently recommending Rehab  PT will continue to follow during hospital stay     Goals   Patient Goals " to walk"   STG Expiration Date 06/08/22   Short Term Goal #1 In 10 days pt will complete: 1) Bed mobility skills with mod I to increase safety and independence as well as decrease caregiver burden  2) Functional transfers with S to promote increased independence, safety, and QOL  3) Ambulate 150' using least restrictive AD with S without LOB and stable vitals so that pt can negotiate previous living environment safely and promote independence with functional mobility and return to PLOF  4) Stair training up/ down 3 step/s using rail/s with S so that pt can enter/negotiate previous living environment safely and decrease fall risk  5) Improve balance grades by 1/2 grade to increase safety with all mobility and decrease fall risk  6) Improve BLE strength by 1/2 grade to help increase overall functional mobility and decrease fall risk  Plan   Treatment/Interventions Functional transfer training;LE strengthening/ROM; Elevations; Therapeutic exercise; Endurance training;Patient/family training;Equipment eval/education; Bed mobility;Gait training;Spoke to nursing;OT   PT Frequency Other (Comment)  (3-6x a week)   Recommendation   PT Discharge Recommendation Post acute rehabilitation services   Equipment Recommended [facility] Recommended Wheeled walker   AM-PAC Basic Mobility Inpatient   Turning in Bed Without Bedrails 3   Lying on Back to Sitting on Edge of Flat Bed 2   Moving Bed to Chair 2   Standing Up From Chair 2   Walk in Room 2   Climb 3-5 Stairs 2   Basic Mobility Inpatient Raw Score 13   Basic Mobility Standardized Score 33 99   Highest Level Of Mobility   -[facility] Goal 4: Move to chair/commode   -HL Achieved 6: Walk 10 steps or more   Modified [place] Scale   Modified [place] Scale 4   Barthel Index   Feeding 10   Bathing 0   Grooming Score 0   Dressing Score 5   Bladder Score 10   Bowels Score 10   Toilet Use Score 5   Transfers (Bed/Chair) Score 5   Mobility (Level Surface) Score 0   Stairs Score 0   Barthel Index Score 45   Portions of the documentation may have been created using voice recognition software  Occasional wrong word or sound alike substitutions may have occurred due to the inherent limitations of the voice recognition software  Read the chart carefully and recognize, using context, where substitutions have occurred      Brook Rios PT, LORENAT

## 2022-05-29 NOTE — PLAN OF CARE
Problem: PHYSICAL THERAPY ADULT  Goal: Performs mobility at highest level of function for planned discharge setting  See evaluation for individualized goals  Description: Treatment/Interventions: Functional transfer training, LE strengthening/ROM, Elevations, Therapeutic exercise, Endurance training, Patient/family training, Equipment eval/education, Bed mobility, Gait training, Spoke to nursing, OT  Equipment Recommended: [place]       See flowsheet documentation for full assessment, interventions and recommendations  Note: Prognosis: Good  Problem List: Decreased strength, Decreased endurance, Impaired balance, Decreased mobility, Decreased safety awareness, Impaired hearing  Assessment: Pt is 79 y o  male seen for PT evaluation s/p admit to [facility] on 5/27/2022  Two pt identifiers were used to confirm  Pt presented w/ concern for worsening subdural hematoma  Patient initially sustained a fall and head injury in April  Patient had follow-up CT head which showed increased size of subdural and patient was admitted to hospital   Pt was admitted with a primary dx of:  Subdural hematoma s/p left CRANIOTOMY FOR SUBDURAL HEMATOMA (Left) which was performed on 05/28/2022   PT now consulted for assessment of mobility and d/c needs  Pt with Out of bed orders  Pts current co morbidities affecting treatment include:  Arthritis, HLD, and personal factors including steps to manage at home  Pts current clinical presentation is Unstable/ Unpredictable (high complexity) due to Ongoing medical management for primary dx, Increased reliance on more restrictive AD compared to baseline, Decreased activity tolerance compared to baseline, Fall risk, Increased assistance needed from caregiver at current time, Continuous pulse oximetry monitoring , s/p surgical intervention, subdural drains at current time   Upon evaluation, pt currently is requiring  Mod Ax1 for transfers and Mod Ax1 for ambulation w/ RW   Pt presents at PT eval functioning below baseline and currently w/ overall mobility deficits 2* to: BLE weakness, impaired balance, decreased endurance, gait deviations, decreased activity tolerance compared to baseline, decreased safety awareness, fall risk  Pt currently at a fall risk 2* to impairments listed above  Based on the aforementioned PT evaluation, pt will continue to benefit from skilled Acute PT interventions to address stated impairments; to maximize functional mobility; for ongoing pt/ family training; and DME needs  At conclusion of PT session pt returned back in chair and chair alarm engaged with phone and call bell within reach  Pt denies any further questions at this time  PT is currently recommending Rehab  PT will continue to follow during hospital stay  PT Discharge Recommendation: Post acute rehabilitation services          See flowsheet documentation for full assessment

## 2022-05-29 NOTE — PROGRESS NOTES
Daily Progress Note - Critical Care   Leanna Lopez 79 y o  male MRN: [id number]  Unit/Bed#: ICU 11 Encounter: [id number]        ----------------------------------------------------------------------------------------  HPI/24hr events:   HPI:  79M w/ fall one month ago, known SDH w/ CT on 5/27/22 showing worsening SDH and shift  No new injuries  No focal symptoms  Patient is deaf at baseline  24 hour events:  5/28/2022 - OR with Neurosurgery for Left Craniotomy and 2 drains  No overnight events  ---------------------------------------------------------------------------------------  SUBJECTIVE  No complaints  Review of Systems   Constitutional: Negative for chills and fever  HENT: Positive for hearing loss (Chronic, at baseline)  Negative for sore throat  Respiratory: Negative for cough and shortness of breath  Cardiovascular: Negative for chest pain and palpitations  Gastrointestinal: Negative for abdominal pain, diarrhea, nausea and vomiting  Endocrine: Negative for polydipsia and polyuria  Genitourinary: Negative for dysuria and frequency  Musculoskeletal: Negative for arthralgias and myalgias  Neurological: Negative for weakness and headaches  Psychiatric/Behavioral: Negative for agitation and confusion  All other systems reviewed and are negative  Review of systems was reviewed and negative unless stated above in HPI/24-hour events   ---------------------------------------------------------------------------------------  Assessment and Plan:    Neuro:   · Diagnosis: Acute on chronic SDH  · CTH 5/27/22: increased size and density of of L subdural collection with mass effect and midline shift  Mild effacement of L lateral ventricle    ? Plan:  § OR with Neurosurgery 5/28/22 - Left craniotomy with Subdural drain Left Frontal and Left Parietal  § Q1 hr neuro checks  § Repeat CT Head ordered 5/29/22  § Keppra 500mg q 12 hours  § Delirium percautions  § CAM ICU  § F/u MRI head and neck     CV:   · Diagnosis: No acute issues  ? Plan:   § Continuous cardiac monitoring  § Maintain MAPs over 65  § Hx of HLD- continue statin           Pulm:  · Diagnosis: No acute issues  ? Plan:   § Maintain sats greater than 92%        GI:   · Diagnosis: No acute issues  ? Plan:   § NPO sips with meds  § Will hold on GI px for now  § Bowel regimen: Colace 100mg bid, Miralax 17g qd, Senokot 8 6mg qd          : normal BUN/Cr  · Diagnosis: No acute issues  ? Plan:   § Monitor BUN and creatinine  § Strict I's and O's         F/E/N:   · Plan:   ? Fluids:  Isolyte @100ml/hour, discontinue once tolerating PO  ? Electrolytes; WNL; continue to monitor  ? Nutrition: Regular Diet        Heme/Onc:   · Diagnosis: no acute issues  ? Plan:   § Monitor Hbg  § DVT Prophylaxis: Hold chemical DVT px at this time  use of SCDs        Endo:   · Diagnosis: No acute issues  ? Plan:   § Maintain Normoglycemia        ID:   · Diagnosis: No acute issues  ? Plan:   § No leukocytosis        MSK/Skin:   · Diagnosis: No acute issues  ? Plan:  § Encourage OOB to chair and early mobilization     Patient appropriate for transfer out of the ICU today?: No  Disposition: Continue Critical Care   Code Status: Level 1 - Full Code  ---------------------------------------------------------------------------------------  ICU CORE MEASURES    Prophylaxis   VTE Pharmacologic Prophylaxis: Pharmacologic VTE Prophylaxis contraindicated due to SDH  VTE Mechanical Prophylaxis: sequential compression device  Stress Ulcer Prophylaxis: Prophylaxis Not Indicated     ABCDE Protocol (if indicated)  Plan to perform spontaneous awakening trial today? Not applicable  Plan to perform spontaneous breathing trial today? Not applicable  Obvious barriers to extubation?  Not applicable  CAM-ICU: Negative    Invasive Devices Review  Invasive Devices  Report    Peripheral Intravenous Line  Duration           Peripheral IV 05/27/22 Left Arm 1 day    Peripheral IV 05/28/22 Right Hand <1 day          Drain  Duration           Ventriculostomy/Subdural Subdural drainage catheter Left Frontal region <1 day    Ventriculostomy/Subdural Subdural drainage catheter Left Parietal region <1 day              Can any invasive devices be discontinued today? No  ---------------------------------------------------------------------------------------  OBJECTIVE    Vitals   Vitals:    22 0315 22 0415 22 0515 22 0615   BP: 112/72 129/68 103/64 109/68   BP Location:  Right arm     Pulse: 58 58 62 76   Resp: 18 12 15 20   Temp:  98 2 °F (36 8 °C)     TempSrc:  Oral     SpO2: 96% 96% 97% 96%   Weight:       Height:         Temp (24hrs), Av 6 °F (36 4 °C), Min:97 °F (36 1 °C), Max:98 2 °F (36 8 °C)  Current: Temperature: 98 2 °F (36 8 °C)    Respiratory:  SpO2: SpO2: 96 %       Invasive/non-invasive ventilation settings   Respiratory  Report   Lab Data (Last 4 hours)    None         O2/Vent Data (Last 4 hours)    None                Physical Exam  Vitals reviewed  Constitutional:       General: He is not in acute distress  Appearance: Normal appearance  He is not ill-appearing, toxic-appearing or diaphoretic  HENT:      Head: Normocephalic and atraumatic  Right Ear: External ear normal       Left Ear: External ear normal       Nose: Nose normal       Mouth/Throat:      Mouth: Mucous membranes are moist       Pharynx: Oropharynx is clear  Eyes:      Extraocular Movements: Extraocular movements intact  Conjunctiva/sclera: Conjunctivae normal    Cardiovascular:      Rate and Rhythm: Normal rate and regular rhythm  Heart sounds: Normal heart sounds  No murmur heard  Pulmonary:      Effort: Pulmonary effort is normal  No respiratory distress  Breath sounds: Normal breath sounds  Abdominal:      General: Bowel sounds are normal  There is no distension  Palpations: Abdomen is soft  Tenderness: There is no abdominal tenderness  Musculoskeletal:      Cervical back: Normal range of motion and neck supple  Right lower leg: No edema  Left lower leg: No edema  Skin:     General: Skin is warm and dry  Neurological:      General: No focal deficit present  Mental Status: He is alert  Mental status is at baseline        Comments: Moving all ext , bilat equal strength in upper and lower extremities   Psychiatric:         Mood and Affect: Mood normal              Laboratory and Diagnostics:  Results from last 7 days   Lab Units 05/29/22  0506 05/28/22  0512 05/27/22  1600   WBC Thousand/uL 9 14 4 94 5 73   HEMOGLOBIN g/dL 12 0 13 1 14 2   HEMATOCRIT % 35 5* 39 7 42 6   PLATELETS Thousands/uL 375 330 380   NEUTROS PCT %  --  66 66   MONOS PCT %  --  11 11     Results from last 7 days   Lab Units 05/29/22  0506 05/28/22  0512 05/27/22  1600   SODIUM mmol/L 135* 138 139   POTASSIUM mmol/L 4 2 4 0 4 2   CHLORIDE mmol/L 106 109* 107   CO2 mmol/L 23 26 25   ANION GAP mmol/L 6 3* 7   BUN mg/dL 13 14 15   CREATININE mg/dL 0 61 0 62 0 78   CALCIUM mg/dL 8 8 8 9 9 0   GLUCOSE RANDOM mg/dL 105 92 94   ALT U/L  --  29 30   AST U/L  --  22 18   ALK PHOS U/L  --  77 79   ALBUMIN g/dL  --  3 2* 3 6   TOTAL BILIRUBIN mg/dL  --  0 58 0 52     Results from last 7 days   Lab Units 05/29/22  0506 05/28/22  0512   MAGNESIUM mg/dL 2 2 2 4   PHOSPHORUS mg/dL  --  3 4      Results from last 7 days   Lab Units 05/29/22  0506 05/28/22  0512 05/27/22  1600   INR  1 10 1 04 1 06   PTT seconds 28  --  30              ABG:    VBG:          Micro        EKG: NA  Imaging:    XR chest 1 view portable    (Results Pending)   MRA carotids wo contrast    (Results Pending)   MRA head wo contrast    (Results Pending)   CT head wo contrast    (Results Pending)       Intake and Output  I/O       05/26 0701 05/27 0700 05/27 0701 05/28 0700    I V  (mL/kg)  375 (4 8)    Total Intake(mL/kg)  375 (4 8)    Net  +375                  Height and Weights   Height: 5' 11" (180 3 cm)  IBW (Ideal Body Weight): 75 3 kg  Body mass index is 24 23 kg/m²  Weight (last 2 days)     Date/Time Weight    05/28/22 2015 78 8 (173 72)    05/27/22 2130 78 8 (173 72)            Nutrition       Diet Orders   (From admission, onward)             Start     Ordered    05/28/22 1145  Diet Regular; Regular House  Diet effective now        References:    Nutrtion Support Algorithm Enteral vs  Parenteral   Question Answer Comment   Diet Type Regular    Regular Regular House    RD to adjust diet per protocol?  No        05/28/22 1144                    Active Medications  Scheduled Meds:  Current Facility-Administered Medications   Medication Dose Route Frequency Provider Last Rate    acetaminophen  650 mg Oral Q6H PRN Janeane Cables, CRNP      cefazolin  2,000 mg Intravenous Q8H Britney Sue Hashimoto, PA-C Stopped (05/29/22 0250)    docusate sodium  100 mg Oral BID Janeane Cables, CRNP      HYDROmorphone  0 5 mg Intravenous Q4H PRN Janeane Cables, CRNP      levETIRAcetam  500 mg Oral Q12H [address] Janeane Cables, CRNP      ondansetron  4 mg Intravenous Q6H PRN Janeane Cables, CRNP      oxyCODONE  10 mg Oral Q4H PRN Janeane Cables, CRNP      oxyCODONE  5 mg Oral Q4H PRN Janeane Cables, CRNP      polyethylene glycol  17 g Oral Daily Janeane Cables, CRNP      pravastatin  40 mg Oral Daily With [employer], CRNP      senna  1 tablet Oral Daily Janeane Cables, CRNP      sodium chloride  100 mL/hr Intravenous Continuous Janeane Cables, CRNP 100 mL/hr (05/28/22 2158)     Continuous Infusions:  sodium chloride, 100 mL/hr, Last Rate: 100 mL/hr (05/28/22 2158)      PRN Meds:   acetaminophen, 650 mg, Q6H PRN  HYDROmorphone, 0 5 mg, Q4H PRN  ondansetron, 4 mg, Q6H PRN  oxyCODONE, 10 mg, Q4H PRN  oxyCODONE, 5 mg, Q4H PRN        Allergies   No Known Allergies  ---------------------------------------------------------------------------------------  Advance Directive and Living Will:      Power of :    POLST:    ---------------------------------------------------------------------------------------  Care Time Delivered:       Krystal Keane,       Portions of the record may have been created with voice recognition software  Occasional wrong word or "sound a like" substitutions may have occurred due to the inherent limitations of voice recognition software    Read the chart carefully and recognize, using context, where substitutions have occurred

## 2022-05-29 NOTE — PLAN OF CARE
Problem: OCCUPATIONAL THERAPY ADULT  Goal: Performs self-care activities at highest level of function for planned discharge setting  See evaluation for individualized goals  Description: Treatment Interventions: ADL retraining, Functional transfer training, UE strengthening/ROM, Endurance training, Visual perceptual retraining, Cognitive reorientation, Patient/family training, Equipment evaluation/education, Neuromuscular reeducation, Fine motor coordination activities, Compensatory technique education, Activityengagement          See flowsheet documentation for full assessment, interventions and recommendations  Note: Limitation: Decreased ADL status, Decreased UE ROM, Decreased endurance, Decreased sensation, Visual deficit, Decreased fine motor control, Decreased self-care trans, Decreased high-level ADLs  Prognosis: Good  Assessment: Pt is a 79 y o  male who was admitted to [facility] on 5/27/2022 with SDH (subdural hematoma) (HCC) acute on chronic with midline shift s/p craniotomy for evacuation  Pt's problem list also includes PMH of limited hearing, previous surgery and arthritis, HLD, CTR  At baseline pt was completing adls and mobility independently w/o ad - admits to 2 falls - I iadls - shares homemaking with spouse  Pt lives with spouse in multilevel home with 1 HATTIE - can have FFSU PRN   Currently pt requires moderate assist for overall ADLS and moderate assist for functional mobility/transfers  Pt currently presents with impairments in the following categories -steps to enter environment, difficulty performing ADLS, difficulty performing IADLS  and environment activity tolerance, endurance, standing balance/tolerance, sitting balance/tolerance, UE strength, UE ROM, FMC, GMC, (R) attention, proprioception  and communication   These impairments, as well as pt's fatigue, (R) hemiparesis, (R) visual deficits , risk for falls and home environment  limit pt's ability to safely engage in all baseline areas of occupation, includingeating, grooming, bathing, dressing, toileting, functional mobility/transfers, community mobility, laundry , driving, house maintenance, medication management, meal prep, cleaning, work/volunteer work , social participation  and leisure activities  From OT standpoint, recommend inpt rehab  upon D/C  OT will continue to follow to address the below stated goals    Recommendation: Physiatry Consult  OT Discharge Recommendation: Post acute rehabilitation services

## 2022-05-29 NOTE — OCCUPATIONAL THERAPY NOTE
Occupational Therapy Evaluation     Patient Name: Sonu Higgins  [id number] Date: 5/29/2022  Problem List  Principal Problem:    SDH (subdural hematoma) [facility])    Past Medical History  Past Medical History:   Diagnosis Date    Arthritis     Encounter for general adult medical examination without abnormal findings 3/20/2019    Hyperlipidemia      Past Surgical History  Past Surgical History:   Procedure Laterality Date    HERNIA REPAIR Right     inguinal    NV REVISE MEDIAN N/CARPAL TUNNEL SURG Right 9/20/2021    Procedure: RELEASE CARPAL TUNNEL;  Surgeon: Ophelia Cummings MD;  Location: [facility];  Service: Orthopedics         05/29/22 1110   OT Last Visit   OT Visit Date 05/29/22   Note Type   Note type Evaluation   Restrictions/Precautions   Weight Bearing Precautions Per Order No   Other Precautions Multiple lines; Fall Risk;Hard of hearing   Pain Assessment   Pain Assessment Tool 0-10   Pain Score No Pain   Home Living   Type of Home House   Home Layout Multi-level; Able to live on main level with bedroom/bathroom;1/2 bath on main level  (1 HATTIE)   Home Equipment Cane   Additional Comments did not use DME PTA   Prior Function   Level of [place] Independent with ADLs and functional mobility   Lives With Spouse   Receives Help From Family   ADL Assistance Independent   IADLs Independent   Falls in the last 6 months 1 to 4  (2)   Vocational   ()   Lifestyle   Autonomy I adls and mobility w/o ad - i iadls - shares homemaking with spouse   Reciprocal Relationships supportive family - spouse and dtr present   Service to Others runs a [address] - per wife pt's trees have been in the [facility] the  Gratification active pta   Subjective   Subjective offers no c/o   ADL   Eating Assistance Unable to assess   Grooming Assistance 4  [address] 3  Moderate Assistance   LB [address] 3  Moderate Assistance   UB Dressing Assistance 3  Moderate Assistance   LB Dressing Assistance 3  Moderate Assistance   Toileting Assistance  3  Moderate Assistance   Bed Mobility   Additional Comments oob in chair   Transfers   Sit to Stand 3  Moderate assistance   Stand to Sit 3  Moderate assistance   Stand pivot 3  Moderate assistance   Functional Mobility   Functional Mobility 3  Moderate assistance   Additional Comments assist of 2nd to assist with line management   Additional items Rolling walker   Balance   Static Sitting Fair   Dynamic Sitting Fair -   Static Standing Poor +   Dynamic Standing Poor   Ambulatory Poor   Activity Tolerance   Activity Tolerance Patient limited by fatigue;Treatment limited secondary to medical complications (Comment)   RUE Assessment   RUE Assessment X  (decreased AROM, strenth and coordination)   LUE Assessment   LUE Assessment WFL   Hand Function   Gross Motor Coordination Impaired  (RUE)   Fine Motor Coordination Impaired  (RUE)   Sensation   Light Touch Partial deficits in the RUE   Proprioception   Proprioception Partial deficits in the RUE   Perception   Inattention/Neglect Cues to attend right visual field;Cues to attend to right side of body   Cognition   Arousal/Participation Arousable; Cooperative   Attention Attends with cues to redirect   Orientation Level Oriented to person;Oriented to place;Oriented to time;Oriented to situation   Memory Decreased recall of precautions   Following Commands Follows one step commands with increased time or repetition   Comments pt [place] -using hearing amplifyer with good results   Assessment   Limitation Decreased ADL status; Decreased UE ROM; Decreased endurance;Decreased sensation;Visual deficit; Decreased fine motor control;Decreased self-care trans;Decreased high-level ADLs   Prognosis Good   Assessment Pt is a 79 y o  male who was [facility] to [facility] on 5/27/2022 with SDH (subdural hematoma) (HCC) acute on chronic with midline shift s/p craniotomy for evacuation  Pt's problem list also includes PMH of limited hearing, previous surgery and arthritis, HLD, CTR  At baseline pt was completing adls and mobility independently w/o ad - admits to 2 falls - I iadls - shares homemaking with spouse  Pt lives with spouse in multilevel home with 1 HATTIE - can have FFSU PRN   Currently pt requires moderate assist for overall ADLS and moderate assist for functional mobility/transfers  Pt currently presents with impairments in the following categories -steps to enter environment, difficulty performing ADLS, difficulty performing IADLS  and environment activity tolerance, endurance, standing balance/tolerance, sitting balance/tolerance, UE strength, UE ROM, FMC, GMC, (R) attention, proprioception  and communication  These impairments, as well as pt's fatigue, (R) hemiparesis, (R) visual deficits , risk for falls and home environment  limit pt's ability to safely engage in all baseline areas of occupation, includingeating, grooming, bathing, dressing, toileting, functional mobility/transfers, community mobility, laundry , driving, house maintenance, medication management, meal prep, cleaning, work/volunteer work , social participation  and leisure activities  From OT standpoint, recommend inpt rehab  upon D/C  OT will continue to follow to address the below stated goals  Goals   Patient Goals none stated at this time   LTG Time Frame 10-14   Long Term Goal #1 refer to established goals below   Plan   Treatment Interventions ADL retraining;Functional transfer training;UE strengthening/ROM; Endurance training;Visual perceptual retraining;Cognitive reorientation;Patient/family training;Equipment evaluation/education; Neuromuscular reeducation; Fine motor coordination activities; Compensatory technique education; Activityengagement   Goal Expiration Date 06/12/22   OT Frequency 3-5x/wk   Recommendation   Recommendation Physiatry Consult   OT Discharge Recommendation Post acute rehabilitation services   AM-[facility] Daily Activity Inpatient   Lower Body Dressing 2   Bathing 2   Toileting 2   Upper Body Dressing 2   Grooming 3   Eating 4   Daily Activity Raw Score 15   Daily Activity Standardized Score (Calc for Raw Score >=11) 34 69   AM-[facility] Applied Cognition Inpatient   Following a Speech/Presentation 3   Understanding Ordinary Conversation 4   Taking Medications 3   Remembering Where Things Are Placed or Put Away 3   Remembering List of 4-5 Errands 3   Taking Care of Complicated Tasks 3   Applied Cognition Raw Score 19   Applied Cognition Standardized Score 39 77       OCCUPATIONAL THERAPY GOALS:    *Mod I adls after setup with use of AE PRN  *Mod I toileting and clothing management   *Mod I functional mobility and transfers to/from all surfaces with good dynamic balance and safety for participation in dynamic adls and iadl tasks   *Demonstrate good carryover with safe use of RW during functional tasks   *Increase RUE [facility] with at least 4/5 strength and [facility] for functional use with adls and iadl tasks   *Assess DME needs   *Increase activity tolerance to 35-40 minutes for participation in adls and enjoyable activities  *Pt to attend to R hemibody/environment with min cues and good carryover of compensatory technique  *Pt to participate in ongoing functional cognitive and  assessment with good attention/participation to assist with safe d/c recommendations      The patient's raw score on the AM-[facility] Daily Activity inpatient short form is 15, standardized score is 34 69, less than 39 4  Patients at this level are likely to benefit from discharge to post-acute rehabilitation services  Please refer to the recommendation of the Occupational Therapist for safe discharge planning      Documentation Completed By:    SHERRY Maurer/L  [employer] Certified - [id number]

## 2022-05-30 ENCOUNTER — APPOINTMENT (INPATIENT)
Dept: RADIOLOGY | Facility: HOSPITAL | Age: 82
DRG: 025 | End: 2022-05-30
Payer: MEDICARE

## 2022-05-30 LAB
ANION GAP SERPL CALCULATED.3IONS-SCNC: 6 MMOL/L (ref 4–13)
BASOPHILS # BLD AUTO: 0.03 THOUSANDS/ΜL (ref 0–0.1)
BASOPHILS NFR BLD AUTO: 0 % (ref 0–1)
BUN SERPL-MCNC: 13 MG/DL (ref 5–25)
CA-I BLD-SCNC: 1.03 MMOL/L (ref 1.12–1.32)
CALCIUM SERPL-MCNC: 9.2 MG/DL (ref 8.3–10.1)
CHLORIDE SERPL-SCNC: 107 MMOL/L (ref 100–108)
CO2 SERPL-SCNC: 24 MMOL/L (ref 21–32)
CREAT SERPL-MCNC: 0.65 MG/DL (ref 0.6–1.3)
EOSINOPHIL # BLD AUTO: 0.09 THOUSAND/ΜL (ref 0–0.61)
EOSINOPHIL NFR BLD AUTO: 1 % (ref 0–6)
ERYTHROCYTE [DISTWIDTH] IN BLOOD BY AUTOMATED COUNT: 13.2 % (ref 11.6–15.1)
GFR SERPL CREATININE-BSD FRML MDRD: 90 ML/MIN/1.73SQ M
GLUCOSE SERPL-MCNC: 105 MG/DL (ref 65–140)
HCT VFR BLD AUTO: 39.2 % (ref 36.5–49.3)
HGB BLD-MCNC: 13.1 G/DL (ref 12–17)
IMM GRANULOCYTES # BLD AUTO: 0.02 THOUSAND/UL (ref 0–0.2)
IMM GRANULOCYTES NFR BLD AUTO: 0 % (ref 0–2)
LYMPHOCYTES # BLD AUTO: 1.15 THOUSANDS/ΜL (ref 0.6–4.47)
LYMPHOCYTES NFR BLD AUTO: 15 % (ref 14–44)
MAGNESIUM SERPL-MCNC: 2.2 MG/DL (ref 1.6–2.6)
MCH RBC QN AUTO: 32.2 PG (ref 26.8–34.3)
MCHC RBC AUTO-ENTMCNC: 33.4 G/DL (ref 31.4–37.4)
MCV RBC AUTO: 96 FL (ref 82–98)
MONOCYTES # BLD AUTO: 0.74 THOUSAND/ΜL (ref 0.17–1.22)
MONOCYTES NFR BLD AUTO: 10 % (ref 4–12)
NEUTROPHILS # BLD AUTO: 5.66 THOUSANDS/ΜL (ref 1.85–7.62)
NEUTS SEG NFR BLD AUTO: 74 % (ref 43–75)
NRBC BLD AUTO-RTO: 0 /100 WBCS
PHOSPHATE SERPL-MCNC: 2.9 MG/DL (ref 2.3–4.1)
PLATELET # BLD AUTO: 416 THOUSANDS/UL (ref 149–390)
PMV BLD AUTO: 9.1 FL (ref 8.9–12.7)
POTASSIUM SERPL-SCNC: 4 MMOL/L (ref 3.5–5.3)
RBC # BLD AUTO: 4.07 MILLION/UL (ref 3.88–5.62)
SODIUM SERPL-SCNC: 137 MMOL/L (ref 136–145)
WBC # BLD AUTO: 7.69 THOUSAND/UL (ref 4.31–10.16)

## 2022-05-30 PROCEDURE — 70450 CT HEAD/BRAIN W/O DYE: CPT

## 2022-05-30 PROCEDURE — 83735 ASSAY OF MAGNESIUM: CPT | Performed by: EMERGENCY MEDICINE

## 2022-05-30 PROCEDURE — 82330 ASSAY OF CALCIUM: CPT | Performed by: EMERGENCY MEDICINE

## 2022-05-30 PROCEDURE — 85025 COMPLETE CBC W/AUTO DIFF WBC: CPT | Performed by: EMERGENCY MEDICINE

## 2022-05-30 PROCEDURE — 84100 ASSAY OF PHOSPHORUS: CPT | Performed by: EMERGENCY MEDICINE

## 2022-05-30 PROCEDURE — 99024 POSTOP FOLLOW-UP VISIT: CPT | Performed by: NEUROLOGICAL SURGERY

## 2022-05-30 PROCEDURE — 80048 BASIC METABOLIC PNL TOTAL CA: CPT | Performed by: EMERGENCY MEDICINE

## 2022-05-30 PROCEDURE — 99291 CRITICAL CARE FIRST HOUR: CPT | Performed by: STUDENT IN AN ORGANIZED HEALTH CARE EDUCATION/TRAINING PROGRAM

## 2022-05-30 PROCEDURE — G1004 CDSM NDSC: HCPCS

## 2022-05-30 RX ORDER — OXYCODONE HYDROCHLORIDE 5 MG/1
5 TABLET ORAL EVERY 4 HOURS PRN
Status: DISCONTINUED | OUTPATIENT
Start: 2022-05-30 | End: 2022-05-30

## 2022-05-30 RX ORDER — OXYCODONE HYDROCHLORIDE 5 MG/1
2.5 TABLET ORAL EVERY 4 HOURS PRN
Status: DISCONTINUED | OUTPATIENT
Start: 2022-05-30 | End: 2022-05-30

## 2022-05-30 RX ORDER — TAMSULOSIN HYDROCHLORIDE 0.4 MG/1
0.4 CAPSULE ORAL
Status: DISCONTINUED | OUTPATIENT
Start: 2022-05-30 | End: 2022-06-06 | Stop reason: HOSPADM

## 2022-05-30 RX ORDER — LANOLIN ALCOHOL/MO/W.PET/CERES
6 CREAM (GRAM) TOPICAL
Status: DISCONTINUED | OUTPATIENT
Start: 2022-05-30 | End: 2022-06-06 | Stop reason: HOSPADM

## 2022-05-30 RX ORDER — CALCIUM GLUCONATE 20 MG/ML
2 INJECTION, SOLUTION INTRAVENOUS ONCE
Status: COMPLETED | OUTPATIENT
Start: 2022-05-30 | End: 2022-05-30

## 2022-05-30 RX ORDER — OXYCODONE HYDROCHLORIDE 5 MG/1
2.5 TABLET ORAL EVERY 4 HOURS PRN
Status: DISCONTINUED | OUTPATIENT
Start: 2022-05-30 | End: 2022-06-06 | Stop reason: HOSPADM

## 2022-05-30 RX ORDER — OXYCODONE HYDROCHLORIDE 5 MG/1
5 TABLET ORAL EVERY 4 HOURS PRN
Status: DISCONTINUED | OUTPATIENT
Start: 2022-05-30 | End: 2022-06-06 | Stop reason: HOSPADM

## 2022-05-30 RX ADMIN — CALCIUM GLUCONATE 2 G: 20 INJECTION, SOLUTION INTRAVENOUS at 10:09

## 2022-05-30 RX ADMIN — TAMSULOSIN HYDROCHLORIDE 0.4 MG: 0.4 CAPSULE ORAL at 09:08

## 2022-05-30 RX ADMIN — DOCUSATE SODIUM 100 MG: 100 CAPSULE, LIQUID FILLED ORAL at 09:20

## 2022-05-30 RX ADMIN — LEVETIRACETAM 500 MG: 500 TABLET, FILM COATED ORAL at 21:20

## 2022-05-30 RX ADMIN — POLYETHYLENE GLYCOL 3350 17 G: 17 POWDER, FOR SOLUTION ORAL at 08:46

## 2022-05-30 RX ADMIN — OXYCODONE HYDROCHLORIDE 5 MG: 5 TABLET ORAL at 17:42

## 2022-05-30 RX ADMIN — Medication 6 MG: at 22:36

## 2022-05-30 RX ADMIN — LEVETIRACETAM 500 MG: 500 TABLET, FILM COATED ORAL at 09:08

## 2022-05-30 RX ADMIN — PRAVASTATIN SODIUM 40 MG: 40 TABLET ORAL at 17:41

## 2022-05-30 RX ADMIN — ACETAMINOPHEN 650 MG: 325 TABLET, FILM COATED ORAL at 03:39

## 2022-05-30 RX ADMIN — SENNOSIDES 8.6 MG: 8.6 TABLET, FILM COATED ORAL at 09:20

## 2022-05-30 RX ADMIN — DOCUSATE SODIUM 100 MG: 100 CAPSULE, LIQUID FILLED ORAL at 17:42

## 2022-05-30 NOTE — PLAN OF CARE
Problem: Potential for Falls  Goal: Patient will remain free of falls  Description: INTERVENTIONS:  - Educate patient/family on patient safety including physical limitations  - Instruct patient to call for assistance with activity   - Consult OT/PT to assist with strengthening/mobility   - Keep Call bell within reach  - Keep bed low and locked with side rails adjusted as appropriate  - Keep care items and personal belongings within reach  - Initiate and maintain comfort rounds  - Make Fall Risk Sign visible to staff  - Offer Toileting every 2 Hours, in advance of need  - Initiate/Maintain Bed/Chair alarm  - Obtain necessary fall risk management equipment  - Apply yellow socks and bracelet for high fall risk patients  - Consider moving patient to room near nurses station  Outcome: Progressing     Problem: MOBILITY - ADULT  Goal: Maintain or return to baseline ADL function  Description: INTERVENTIONS:  -  Assess patient's ability to carry out ADLs; assess patient's baseline for ADL function and identify physical deficits which impact ability to perform ADLs (bathing, care of mouth/teeth, toileting, grooming, dressing, etc )  - Assess/evaluate cause of self-care deficits   - Assess range of motion  - Assess patient's mobility; develop plan if impaired  - Assess patient's need for assistive devices and provide as appropriate  - Encourage maximum independence but intervene and supervise when necessary  - Involve family in performance of ADLs  - Assess for home care needs following discharge   - Consider OT consult to assist with ADL evaluation and planning for discharge  - Provide patient education as appropriate  Outcome: Progressing  Goal: Maintains/Returns to pre admission functional level  Description: INTERVENTIONS:  - Perform BMAT or MOVE assessment daily    - Set and communicate daily mobility goal to care team and patient/family/caregiver     - Collaborate with rehabilitation services on mobility goals if consulted  - Perform Range of Motion 6 times a day  - Reposition patient every 2 hours  - Dangle patient 2 times a day  - Stand patient 2 times a day  - Ambulate patient 2 times a day  - Out of bed to chair 2 times a day   - Out of bed for meals 2 times a day  - Out of bed for toileting  - Record patient progress and toleration of activity level   Outcome: Progressing     Problem: Prexisting or High Potential for Compromised Skin Integrity  Goal: Skin integrity is maintained or improved  Description: INTERVENTIONS:  - Identify patients at risk for skin breakdown  - Assess and monitor skin integrity  - Assess and monitor nutrition and hydration status  - Monitor labs   - Assess for incontinence   - Turn and reposition patient  - Assist with mobility/ambulation  - Relieve pressure over bony prominences  - Avoid friction and shearing  - Provide appropriate hygiene as needed including keeping skin clean and dry  - Evaluate need for skin moisturizer/barrier cream  - Collaborate with interdisciplinary team   - Patient/family teaching  - Consider wound care consult   Outcome: Progressing     Problem: Nutrition/Hydration-ADULT  Goal: Nutrient/Hydration intake appropriate for improving, restoring or maintaining nutritional needs  Description: Monitor and assess patient's nutrition/hydration status for malnutrition  Collaborate with interdisciplinary team and initiate plan and interventions as ordered  Monitor patient's weight and dietary intake as ordered or per policy  Utilize nutrition screening tool and intervene as necessary  Determine patient's food preferences and provide high-protein, high-caloric foods as appropriate       INTERVENTIONS:  - Monitor oral intake, urinary output, labs, and treatment plans  - Assess nutrition and hydration status and recommend course of action  - Evaluate amount of meals eaten  - Assist patient with eating if necessary   - Allow adequate time for meals  - Recommend/ encourage appropriate diets, oral nutritional supplements, and vitamin/mineral supplements  - Order, calculate, and assess calorie counts as needed  - Recommend, monitor, and adjust tube feedings and TPN/PPN based on assessed needs  - Assess need for intravenous fluids  - Provide specific nutrition/hydration education as appropriate  - Include patient/family/caregiver in decisions related to nutrition  Outcome: Progressing     Problem: NEUROSENSORY - ADULT  Goal: Achieves stable or improved neurological status  Description: INTERVENTIONS  - Monitor and report changes in neurological status  - Monitor vital signs such as temperature, blood pressure, glucose, and any other labs ordered   - Initiate measures to prevent increased intracranial pressure  - Monitor for seizure activity and implement precautions if appropriate      Outcome: Progressing  Goal: Achieves maximal functionality and self care  Description: INTERVENTIONS  - Monitor swallowing and airway patency with patient fatigue and changes in neurological status  - Encourage and assist patient to increase activity and self care     - Encourage visually impaired, hearing impaired and aphasic patients to use assistive/communication devices  Outcome: Progressing     Problem: CARDIOVASCULAR - ADULT  Goal: Maintains optimal cardiac output and hemodynamic stability  Description: INTERVENTIONS:  - Monitor I/O, vital signs and rhythm  - Monitor for S/S and trends of decreased cardiac output  - Administer and titrate ordered vasoactive medications to optimize hemodynamic stability  - Assess quality of pulses, skin color and temperature  - Assess for signs of decreased coronary artery perfusion  - Instruct patient to report change in severity of symptoms  Outcome: Progressing  Goal: Absence of cardiac dysrhythmias or at baseline rhythm  Description: INTERVENTIONS:  - Continuous cardiac monitoring, vital signs, obtain 12 lead EKG if ordered  - Administer antiarrhythmic and heart rate control medications as ordered  - Monitor electrolytes and administer replacement therapy as ordered  Outcome: Progressing     Problem: RESPIRATORY - ADULT  Goal: Achieves optimal ventilation and oxygenation  Description: INTERVENTIONS:  - Assess for changes in respiratory status  - Assess for changes in mentation and behavior  - Position to facilitate oxygenation and minimize respiratory effort  - Oxygen administered by appropriate delivery if ordered  - Initiate smoking cessation education as indicated  - Encourage broncho-pulmonary hygiene including cough, deep breathe, Incentive Spirometry  - Assess the need for suctioning and aspirate as needed  - Assess and instruct to report SOB or any respiratory difficulty  - Respiratory Therapy support as indicated  Outcome: Progressing     Problem: GASTROINTESTINAL - ADULT  Goal: Maintains or returns to baseline bowel function  Description: INTERVENTIONS:  - Assess bowel function  - Encourage oral fluids to ensure adequate hydration  - Administer IV fluids if ordered to ensure adequate hydration  - Administer ordered medications as needed  - Encourage mobilization and activity  - Consider nutritional services referral to assist patient with adequate nutrition and appropriate food choices  Outcome: Progressing  Goal: Maintains adequate nutritional intake  Description: INTERVENTIONS:  - Monitor percentage of each meal consumed  - Identify factors contributing to decreased intake, treat as appropriate  - Assist with meals as needed  - Monitor I&O, weight, and lab values if indicated  - Obtain nutrition services referral as needed  Outcome: Progressing     Problem: GENITOURINARY - ADULT  Goal: Maintains or returns to baseline urinary function  Description: INTERVENTIONS:  - Assess urinary function  - Encourage oral fluids to ensure adequate hydration if ordered  - Administer IV fluids as ordered to ensure adequate hydration  - Administer ordered medications as needed  - Offer frequent toileting  - Follow urinary retention protocol if ordered  Outcome: Progressing  Goal: Absence of urinary retention  Description: INTERVENTIONS:  - Assess patients ability to void and empty bladder  - Monitor I/O  - Bladder scan as needed  - Discuss with physician/AP medications to alleviate retention as needed  - Discuss catheterization for long term situations as appropriate  Outcome: Progressing     Problem: METABOLIC, FLUID AND ELECTROLYTES - ADULT  Goal: Electrolytes maintained within normal limits  Description: INTERVENTIONS:  - Monitor labs and assess patient for signs and symptoms of electrolyte imbalances  - Administer electrolyte replacement as ordered  - Monitor response to electrolyte replacements, including repeat lab results as appropriate  - Instruct patient on fluid and nutrition as appropriate  Outcome: Progressing  Goal: Fluid balance maintained  Description: INTERVENTIONS:  - Monitor labs   - Monitor I/O and WT  - Instruct patient on fluid and nutrition as appropriate  - Assess for signs & symptoms of volume excess or deficit  Outcome: Progressing     Problem: SKIN/TISSUE INTEGRITY - ADULT  Goal: Skin Integrity remains intact(Skin Breakdown Prevention)  Description: Assess:  -Perform Alejandro assessment every shift  -Clean and moisturize skin every shift  -Inspect skin when repositioning, toileting, and assisting with ADLS  -Assess under medical devices every shift  -Assess extremities for adequate circulation and sensation     Bed Management:  -Have minimal linens on bed & keep smooth, unwrinkled  -Change linens as needed when moist or perspiring  -Avoid sitting or lying in one position for more than 2 hours while in bed  -Keep HOB at 30 degrees     Toileting:  -Offer bedside commode  -Assess for incontinence every 2 hours  -Use incontinent care products after each incontinent episode     Activity:  -Mobilize patient 2 times a day  -Encourage activity and walks on unit  -Encourage or provide ROM exercises   -Turn and reposition patient every 2 Hours  -Use appropriate equipment to lift or move patient in bed  -Instruct/ Assist with weight shifting every 1 hour when out of bed in chair  -Consider limitation of chair time 4 hour intervals    Skin Care:  -Avoid use of baby powder, tape, friction and shearing, hot water or constrictive clothing  -Relieve pressure over bony prominences   -Do not massage red bony areas    Outcome: Progressing  Goal: Incision(s), wounds(s) or drain site(s) healing without S/S of infection  Description: INTERVENTIONS  - Assess and document dressing, incision, wound bed, drain sites and surrounding tissue  - Provide patient and family education  - Perform skin care/dressing changes every shift  Outcome: Progressing     Problem: HEMATOLOGIC - ADULT  Goal: Maintains hematologic stability  Description: INTERVENTIONS  - Assess for signs and symptoms of bleeding or hemorrhage  - Monitor labs  - Administer supportive blood products/factors as ordered and appropriate  Outcome: Progressing     Problem: MUSCULOSKELETAL - ADULT  Goal: Maintain or return mobility to safest level of function  Description: INTERVENTIONS:  - Assess patient's ability to carry out ADLs; assess patient's baseline for ADL function and identify physical deficits which impact ability to perform ADLs (bathing, care of mouth/teeth, toileting, grooming, dressing, etc )  - Assess/evaluate cause of self-care deficits   - Assess range of motion  - Assess patient's mobility  - Assess patient's need for assistive devices and provide as appropriate  - Encourage maximum independence but intervene and supervise when necessary  - Involve family in performance of ADLs  - Assess for home care needs following discharge   - Consider OT consult to assist with ADL evaluation and planning for discharge  - Provide patient education as appropriate  Outcome: Progressing     Problem: COPING  Goal: Pt/Family able to verbalize concerns and demonstrate effective coping strategies  Description: INTERVENTIONS:  - Assist patient/family to identify coping skills, available support systems and cultural and spiritual values  - Provide emotional support, including active listening and acknowledgement of concerns of patient and caregivers  - Reduce environmental stimuli, as able  - Provide patient education  - Assess for spiritual pain/suffering and initiate spiritual care, including notification of Pastoral Care or [place] based community as needed  - Assess effectiveness of coping strategies  Outcome: Progressing  Goal: Will report anxiety at manageable levels  Description: INTERVENTIONS:  - Administer medication as ordered  - Teach and encourage coping skills  - Provide emotional support  - Assess patient/family for anxiety and ability to cope  Outcome: Progressing     Problem: PAIN - ADULT  Goal: Verbalizes/displays adequate comfort level or baseline comfort level  Description: Interventions:  - Encourage patient to monitor pain and request assistance  - Assess pain using appropriate pain scale  - Administer analgesics based on type and severity of pain and evaluate response  - Implement non-pharmacological measures as appropriate and evaluate response  - Consider cultural and social influences on pain and pain management  - Notify physician/advanced practitioner if interventions unsuccessful or patient reports new pain  Outcome: Progressing     Problem: INFECTION - ADULT  Goal: Absence or prevention of progression during hospitalization  Description: INTERVENTIONS:  - Assess and monitor for signs and symptoms of infection  - Monitor lab/diagnostic results  - Monitor all insertion sites, i e  indwelling lines, tubes, and drains  - Monitor endotracheal if appropriate and nasal secretions for changes in amount and color  - [place] appropriate cooling/warming therapies per order  - Administer medications as ordered  - Instruct and encourage patient and family to use good hand hygiene technique  - Identify and instruct in appropriate isolation precautions for identified infection/condition  Outcome: Progressing     Problem: SAFETY ADULT  Goal: Patient will remain free of falls  Description: INTERVENTIONS:  - Educate patient/family on patient safety including physical limitations  - Instruct patient to call for assistance with activity   - Consult OT/PT to assist with strengthening/mobility   - Keep Call bell within reach  - Keep bed low and locked with side rails adjusted as appropriate  - Keep care items and personal belongings within reach  - Initiate and maintain comfort rounds  - Make Fall Risk Sign visible to staff  - Offer Toileting every 2 Hours, in advance of need  - Initiate/Maintain Bed/Chair alarm  - Obtain necessary fall risk management equipment  - Apply yellow socks and bracelet for high fall risk patients  - Consider moving patient to room near nurses station  Outcome: Progressing  Goal: Maintain or return to baseline ADL function  Description: INTERVENTIONS:  -  Assess patient's ability to carry out ADLs; assess patient's baseline for ADL function and identify physical deficits which impact ability to perform ADLs (bathing, care of mouth/teeth, toileting, grooming, dressing, etc )  - Assess/evaluate cause of self-care deficits   - Assess range of motion  - Assess patient's mobility; develop plan if impaired  - Assess patient's need for assistive devices and provide as appropriate  - Encourage maximum independence but intervene and supervise when necessary  - Involve family in performance of ADLs  - Assess for home care needs following discharge   - Consider OT consult to assist with ADL evaluation and planning for discharge  - Provide patient education as appropriate  Outcome: Progressing  Goal: Maintains/Returns to pre admission functional level  Description: INTERVENTIONS:  - Perform BMAT or MOVE assessment daily    - Set and communicate daily mobility goal to care team and patient/family/caregiver  - Collaborate with rehabilitation services on mobility goals if consulted  - Perform Range of Motion 6 times a day  - Reposition patient every 2 hours  - Dangle patient 2 times a day  - Stand patient 2 times a day  - Ambulate patient 2 times a day  - Out of bed to chair 2 times a day   - Out of bed for meals 2 times a day  - Out of bed for toileting  - Record patient progress and toleration of activity level   Outcome: Progressing     Problem: DISCHARGE PLANNING  Goal: Discharge to home or other facility with appropriate resources  Description: INTERVENTIONS:  - Identify barriers to discharge w/patient and caregiver  - Arrange for needed discharge resources and transportation as appropriate  - Identify discharge learning needs (meds, wound care, etc )  - Arrange for interpretive services to assist at discharge as needed  - Refer to Case Management Department for coordinating discharge planning if the patient needs post-hospital services based on physician/advanced practitioner order or complex needs related to functional status, cognitive ability, or social support system  Outcome: Progressing     Problem: Knowledge Deficit  Goal: Patient/family/caregiver demonstrates understanding of disease process, treatment plan, medications, and discharge instructions  Description: Complete learning assessment and assess knowledge base    Interventions:  - Provide teaching at level of understanding  - Provide teaching via preferred learning methods  Outcome: Progressing

## 2022-05-30 NOTE — CASE MANAGEMENT
Case Management Assessment    Patient name Kayce Ball  Location ICU 11/ICU 11 MRN [id number]  : 1940 Date 2022       Current Admission Date: 2022  Current Admission Diagnosis:SDH (subdural hematoma) 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    Overweight (BMI 25 0-29 9) 2021    Negative depression screening 2019    Medicare annual wellness visit, subsequent 2019    Hypercholesterolemia 2018    Borderline hypertension 2018      LOS (days): 3  Geometric Mean LOS (GMLOS) (days):   Days to GMLOS:     OBJECTIVE:    Risk of Unplanned Readmission Score: 10 91         Current admission status: Inpatient       [facility] Pharmacy:   [facility] [address] [place] [address]  Phone: [phone] Fax: [phone]    Primary Care Provider: Ellyn Flores DO    Primary Insurance: MEDICARE  Secondary Insurance: Ramy Hui 20:  Active Health Care Proxies    There are no active Health Care Proxies on file  Patient Information  Admitted from[de-identified] Home  Mental Status: Alert  During Assessment patient was accompanied by: Spouse  Assessment information provided by[de-identified] Spouse  Primary Caregiver: Self  Support Systems: Spouse/significant other  Home entry access options   Select all that apply : Stairs  Number of steps to enter home : 1  Type of Current Residence: [address] [place] home  Upon entering residence, is there a bedroom on the main floor (no further steps)?: Yes (pt room is in the 2nd floor, 10 steps)  Upon entering residence, is there a bathroom on the main floor (no further steps)?: Yes  Homeless/housing insecurity resource given?: N/A  Living Arrangements: Lives w/ Spouse/significant other    Activities of Daily Living Prior to Admission  Functional Status: Independent  Completes ADLs independently?: Yes  Ambulates independently?: Yes  Does patient use assisted devices?: No  Does patient currently own DME?: Yes  What DME does the patient currently own?: [employer]  Does patient have a history of Outpatient Therapy (PT/OT)?: Yes  Does the patient have a history of Short-Term Rehab?: No  Does patient have a history of HHC?: No  [facility]         Patient Information Continued  Income Source: Pension/penitentiary  Food insecurity resource given?: N/A  Does patient receive dialysis treatments?: No  Does patient have a history of substance abuse?: No  Does patient have a history of Mental Health Diagnosis?: No         Means of Transportation  Means of Transport to \[facility]\""[de-identified] Drives Self  Was application for public transport provided?: N/A      No psych or drug history  Wife aware of STR recommendation, she will speak with daughter and provide final answer tomorrow

## 2022-05-30 NOTE — PROGRESS NOTES
Daily Progress Note - Critical Care   Rob Kurtz 79 y o  male MRN: [id number]  Unit/Bed#: ICU 11 Encounter: [id number]        ----------------------------------------------------------------------------------------  HPI/24hr events: Episode of hypoxia overnight which resolved with awakening pt  Pt also required straight cath x2 overnight for 1 1L total    ---------------------------------------------------------------------------------------  SUBJECTIVE  Denies headache, nausea, chest pain, abdominal pain  Review of Systems   All other systems reviewed and are negative  Review of systems was reviewed and negative unless stated above in HPI/24-hour events   ---------------------------------------------------------------------------------------  Assessment and Plan:     Neuro:   · Diagnosis: Acute on chronic SDH, status post and left craniotomy on 5/28  · L Subdural drains: 84 cc and 48 cc serosang  ? Plan:  § Follow-up a m  Head CT  § Q 1 hour neuro checks  § Continue to hold chemical DVT prophylaxis  § Delirium precautions  § Drains per Neurosurgery  § P r n  Pain control:  Tylenol, Oxy 2 5/5 mg q 4 hours for moderate to severe pain     CV:   · Diagnosis: No acute issues  ? Plan:   § Continuous cardiac monitoring  § Continue statin  § Maintain maps greater than 65             Pulm:  Episode of hypoxia overnight that resolved with waking  · Diagnosis: No acute issues  ? Plan:   § Maintain saturations greater than 92%        GI:   · Diagnosis: No acute issues  ? Plan:   § Regular diet  § Bowel regimen  Colace 100 mg b i d , MiraLax daily and senna daily        : UOP: 3 45L  · Diagnosis: Urinary retention s/p straight catheterization  ? Plan:   ? Urinary retention protocol  ? Consider starting flomax  § Strict I's/O's  § Monitor BUN/creatinine      F/E/N:   · Plan:   ? Fluids: off fluids  ? Electrolytes; within normal limits replete as needed  ?  Nutrition:  Regular diet as tolerated        Heme/Onc: · Diagnosis: no acute issues  ? Plan:   § Continue to hold chemical DVT prophylaxis Neurosurgery  § Monitor hemoglobin        Endo:   · Diagnosis: No acute issues  ? Plan:   § Maintain normal glycemia          ID:   · Diagnosis: No acute issues  ? Plan:   § Monitor fever and white blood cell curve        MSK/Skin:   · Diagnosis: No acute issues  ? Plan:  § Encourage out of bed to chair  § PT/OT    Patient appropriate for transfer out of the ICU today?: Patient does not meet criteria for ICU Follow-up Clinic; referral has not been made  Disposition: Transfer to Stepdown Level 2  Code Status: Level 1 - Full Code  ---------------------------------------------------------------------------------------  ICU CORE MEASURES    Prophylaxis   VTE Pharmacologic Prophylaxis: Pharmacologic VTE Prophylaxis contraindicated due to head bleed  VTE Mechanical Prophylaxis: sequential compression device  Stress Ulcer Prophylaxis: Prophylaxis Not Indicated     ABCDE Protocol (if indicated)  Plan to perform spontaneous awakening trial today? Not applicable  Plan to perform spontaneous breathing trial today? Not applicable  Obvious barriers to extubation? Not applicable  CAM-ICU: Negative    Invasive Devices Review  Invasive Devices  Report    Peripheral Intravenous Line  Duration           Peripheral IV 05/28/22 Right Hand 1 day          Drain  Duration           Ventriculostomy/Subdural Subdural drainage catheter Left Frontal region 1 day    Ventriculostomy/Subdural Subdural drainage catheter Left Parietal region 1 day              Can any invasive devices be discontinued today?  No  ---------------------------------------------------------------------------------------  OBJECTIVE    Vitals   Vitals:    05/29/22 1915 05/29/22 2015 05/29/22 2115 05/29/22 2315   BP: 109/65 129/69 99/59 118/74   BP Location:       Pulse: 88  68 72   Resp: (!) 25  15 15   Temp:       TempSrc:       SpO2: 95%  96% 93%   Weight:       Height:         Temp (24hrs), Av 9 °F (36 6 °C), Min:97 7 °F (36 5 °C), Max:98 2 °F (36 8 °C)  Current: Temperature: 98 °F (36 7 °C)    Respiratory:  SpO2: SpO2: 93 %       Invasive/non-invasive ventilation settings   Respiratory  Report   Lab Data (Last 4 hours)    None         O2/Vent Data (Last 4 hours)    None                Physical Exam  Vitals reviewed  Constitutional:       General: He is not in acute distress  Appearance: Normal appearance  He is not ill-appearing or toxic-appearing  HENT:      Head: Normocephalic  Comments: Craniotomy site with 2 subdural drains     Nose: Nose normal       Mouth/Throat:      Mouth: Mucous membranes are moist    Eyes:      Extraocular Movements: Extraocular movements intact  Cardiovascular:      Rate and Rhythm: Normal rate  Pulmonary:      Effort: Pulmonary effort is normal  No respiratory distress  Musculoskeletal:      Cervical back: Normal range of motion  Comments: SCDs in place   Skin:     General: Skin is warm and dry  Neurological:      Mental Status: He is alert  Mental status is at baseline  Comments: Moving all ext     Decreased strength in the RUE, otherwise neuro intact   Psychiatric:         Mood and Affect: Mood normal          Behavior: Behavior normal              Laboratory and Diagnostics:  Results from last 7 days   Lab Units 22  0506 22  0512 22  1600   WBC Thousand/uL 9 14 4 94 5 73   HEMOGLOBIN g/dL 12 0 13 1 14 2   HEMATOCRIT % 35 5* 39 7 42 6   PLATELETS Thousands/uL 375 330 380   NEUTROS PCT %  --  66 66   MONOS PCT %  --  11 11     Results from last 7 days   Lab Units 22  1437 22  0506 22  0512 22  1600   SODIUM mmol/L 139 135* 138 139   POTASSIUM mmol/L 3 9 4 2 4 0 4 2   CHLORIDE mmol/L 105 106 109* 107   CO2 mmol/L 28 23 26 25   ANION GAP mmol/L 6 6 3* 7   BUN mg/dL 15 13 14 15   CREATININE mg/dL 0 79 0 61 0 62 0 78   CALCIUM mg/dL 9 4 8 8 8 9 9 0   GLUCOSE RANDOM mg/dL 104 105 92 94   ALT U/L  --   --  29 30   AST U/L  --   --  22 18   ALK PHOS U/L  --   --  77 79   ALBUMIN g/dL  --   --  3 2* 3 6   TOTAL BILIRUBIN mg/dL  --   --  0 58 0 52     Results from last 7 days   Lab Units 05/29/22  0506 05/28/22  0512   MAGNESIUM mg/dL 2 2 2 4   PHOSPHORUS mg/dL  --  3 4      Results from last 7 days   Lab Units 05/29/22  0506 05/28/22  0512 05/27/22  1600   INR  1 10 1 04 1 06   PTT seconds 28  --  30              ABG:    VBG:          Micro        EKG: NA  Imaging:  MRA carotids wo contrast   Final Result by Andre Schaumann, MD (05/29 4496)      No hemodynamically significant stenosis of cervical carotid and vertebral arteries  Workstation performed: [id number]         MRA head wo contrast   Final Result by Andre Schaumann, MD (05/29 4398)      1  Patent major vasculature of [place]  No AV malformation or aneurysm  2   Diminished flow related signal in the distal left MCA branches likely secondary to mass effect from subdural collection  3   Interval left craniotomy for subdural hematoma evacuation with pneumocephalus and residual layering subdural fluid/blood  Subdural drain is in place  Persistent mass effect and 9 mm left-to-right midline shift  Workstation performed: [id number]         XR chest 1 view portable   Final Result by Humza Prasad MD (05/29 2157)      Mild bibasilar atelectasis  Workstation performed: [id number]         CT head wo contrast    (Results Pending)       Intake and Output  I/O       05/28 0701  05/29 0700 05/29 0701  05/30 0700    P  O  440 770    I V  (mL/kg) 2700 (34 3) 400 (5 1)    Blood 400     IV Piggyback 200 100    Total Intake(mL/kg) 3740 (47 5) 1270 (16 1)    Urine (mL/kg/hr) 775 (0 4) 2000 (1 1)    Drains 15 83    Total Output 790 2083    Net +2950 -813          Unmeasured Urine Occurrence 5 x             Height and Weights   Height: 5' 11" (180 3 cm)  IBW (Ideal Body Weight): 75 3 kg  Body mass index is 24 23 kg/m²  Weight (last 2 days)     Date/Time Weight    05/28/22 2015 78 8 (173 72)            Nutrition       Diet Orders   (From admission, onward)             Start     Ordered    05/28/22 1145  Diet Regular; Regular House  Diet effective now        References:    Nutrtion Support Algorithm Enteral vs  Parenteral   Question Answer Comment   Diet Type Regular    Regular Regular House    RD to adjust diet per protocol? No        05/28/22 1144                    Active Medications  Scheduled Meds:  Current Facility-Administered Medications   Medication Dose Route Frequency Provider Last Rate    acetaminophen  650 mg Oral Q6H PRN Fran Lipscomb, MATEUSNP      docusate sodium  100 mg Oral BID Fran Lipscomb, MATEUSNP      levETIRAcetam  500 mg Oral Q12H [address] Fran Deisi, MATEUSNP      ondansetron  4 mg Intravenous Q6H PRN Fran Lipscomb, MATEUSNP      oxyCODONE  10 mg Oral Q4H PRN Fran Lipscomb, MATEUSNP      oxyCODONE  5 mg Oral Q4H PRN Fran Lipscomb, CRNP      polyethylene glycol  17 g Oral Daily Fran Lipscomb, MATEUSNP      pravastatin  40 mg Oral Daily With [employer], CRNP      senna  1 tablet Oral Daily Kate Quezada, RONY       Continuous Infusions:     PRN Meds:   acetaminophen, 650 mg, Q6H PRN  ondansetron, 4 mg, Q6H PRN  oxyCODONE, 10 mg, Q4H PRN  oxyCODONE, 5 mg, Q4H PRN        Allergies   No Known Allergies  ---------------------------------------------------------------------------------------  Advance Directive and Living Will:      Power of :    POLST:    ---------------------------------------------------------------------------------------  Care Time Delivered:       Francine Lowe MD      Portions of the record may have been created with voice recognition software  Occasional wrong word or "sound a like" substitutions may have occurred due to the inherent limitations of voice recognition software    Read the chart carefully and recognize, using context, where substitutions have occurred

## 2022-05-31 ENCOUNTER — APPOINTMENT (INPATIENT)
Dept: RADIOLOGY | Facility: HOSPITAL | Age: 82
DRG: 025 | End: 2022-05-31
Payer: MEDICARE

## 2022-05-31 LAB
ANION GAP SERPL CALCULATED.3IONS-SCNC: 3 MMOL/L (ref 4–13)
BUN SERPL-MCNC: 13 MG/DL (ref 5–25)
CALCIUM SERPL-MCNC: 9.1 MG/DL (ref 8.3–10.1)
CHLORIDE SERPL-SCNC: 105 MMOL/L (ref 100–108)
CO2 SERPL-SCNC: 27 MMOL/L (ref 21–32)
CREAT SERPL-MCNC: 0.55 MG/DL (ref 0.6–1.3)
ERYTHROCYTE [DISTWIDTH] IN BLOOD BY AUTOMATED COUNT: 13.1 % (ref 11.6–15.1)
GFR SERPL CREATININE-BSD FRML MDRD: 97 ML/MIN/1.73SQ M
GLUCOSE SERPL-MCNC: 122 MG/DL (ref 65–140)
HCT VFR BLD AUTO: 40.4 % (ref 36.5–49.3)
HGB BLD-MCNC: 13.5 G/DL (ref 12–17)
MCH RBC QN AUTO: 31.6 PG (ref 26.8–34.3)
MCHC RBC AUTO-ENTMCNC: 33.4 G/DL (ref 31.4–37.4)
MCV RBC AUTO: 95 FL (ref 82–98)
PLATELET # BLD AUTO: 392 THOUSANDS/UL (ref 149–390)
PMV BLD AUTO: 8.8 FL (ref 8.9–12.7)
POTASSIUM SERPL-SCNC: 4.2 MMOL/L (ref 3.5–5.3)
RBC # BLD AUTO: 4.27 MILLION/UL (ref 3.88–5.62)
SODIUM SERPL-SCNC: 135 MMOL/L (ref 136–145)
WBC # BLD AUTO: 8.22 THOUSAND/UL (ref 4.31–10.16)

## 2022-05-31 PROCEDURE — G1004 CDSM NDSC: HCPCS

## 2022-05-31 PROCEDURE — 70450 CT HEAD/BRAIN W/O DYE: CPT

## 2022-05-31 PROCEDURE — 99024 POSTOP FOLLOW-UP VISIT: CPT | Performed by: RADIOLOGY

## 2022-05-31 PROCEDURE — 80048 BASIC METABOLIC PNL TOTAL CA: CPT | Performed by: NURSE PRACTITIONER

## 2022-05-31 PROCEDURE — 99291 CRITICAL CARE FIRST HOUR: CPT | Performed by: SURGERY

## 2022-05-31 PROCEDURE — 85027 COMPLETE CBC AUTOMATED: CPT | Performed by: NURSE PRACTITIONER

## 2022-05-31 PROCEDURE — 99024 POSTOP FOLLOW-UP VISIT: CPT | Performed by: NEUROLOGICAL SURGERY

## 2022-05-31 RX ADMIN — SENNOSIDES 8.6 MG: 8.6 TABLET, FILM COATED ORAL at 08:04

## 2022-05-31 RX ADMIN — LEVETIRACETAM 500 MG: 500 TABLET, FILM COATED ORAL at 08:04

## 2022-05-31 RX ADMIN — PRAVASTATIN SODIUM 40 MG: 40 TABLET ORAL at 17:43

## 2022-05-31 RX ADMIN — DOCUSATE SODIUM 100 MG: 100 CAPSULE, LIQUID FILLED ORAL at 17:44

## 2022-05-31 RX ADMIN — Medication 6 MG: at 21:29

## 2022-05-31 RX ADMIN — TAMSULOSIN HYDROCHLORIDE 0.4 MG: 0.4 CAPSULE ORAL at 17:44

## 2022-05-31 RX ADMIN — POLYETHYLENE GLYCOL 3350 17 G: 17 POWDER, FOR SOLUTION ORAL at 08:04

## 2022-05-31 RX ADMIN — OXYCODONE HYDROCHLORIDE 5 MG: 5 TABLET ORAL at 07:46

## 2022-05-31 RX ADMIN — LEVETIRACETAM 500 MG: 500 TABLET, FILM COATED ORAL at 21:29

## 2022-05-31 RX ADMIN — DOCUSATE SODIUM 100 MG: 100 CAPSULE, LIQUID FILLED ORAL at 08:04

## 2022-05-31 NOTE — PROGRESS NOTES
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

## 2022-05-31 NOTE — PLAN OF CARE
Problem: Potential for Falls  Goal: Patient will remain free of falls  Description: INTERVENTIONS:  - Educate patient/family on patient safety including physical limitations  - Instruct patient to call for assistance with activity   - Consult OT/PT to assist with strengthening/mobility   - Keep Call bell within reach  - Keep bed low and locked with side rails adjusted as appropriate  - Keep care items and personal belongings within reach  - Initiate and maintain comfort rounds  - Make Fall Risk Sign visible to staff  - Offer Toileting every 2 Hours, in advance of need  - Initiate/Maintain bed alarm  - Apply yellow socks and bracelet for high fall risk patients  - Consider moving patient to room near nurses station  Outcome: Progressing     Problem: MOBILITY - ADULT  Goal: Maintain or return to baseline ADL function  Description: INTERVENTIONS:  -  Assess patient's ability to carry out ADLs; assess patient's baseline for ADL function and identify physical deficits which impact ability to perform ADLs (bathing, care of mouth/teeth, toileting, grooming, dressing, etc )  - Assess/evaluate cause of self-care deficits   - Assess range of motion  - Assess patient's mobility; develop plan if impaired  - Assess patient's need for assistive devices and provide as appropriate  - Encourage maximum independence but intervene and supervise when necessary  - Involve family in performance of ADLs  - Assess for home care needs following discharge   - Consider OT consult to assist with ADL evaluation and planning for discharge  - Provide patient education as appropriate  Outcome: Progressing  Goal: Maintains/Returns to pre admission functional level  Description: INTERVENTIONS:  - Perform BMAT or MOVE assessment daily    - Set and communicate daily mobility goal to care team and patient/family/caregiver  - Collaborate with rehabilitation services on mobility goals if consulted  - Perform Range of Motion 3 times a day    - Reposition patient every 2 hours  - Dangle patient 3 times a day  - Stand patient 3 times a day  - Ambulate patient 3 times a day  - Out of bed to chair 3 times a day   - Out of bed for meals 3 times a day  - Out of bed for toileting  - Record patient progress and toleration of activity level   Outcome: Progressing     Problem: Prexisting or High Potential for Compromised Skin Integrity  Goal: Skin integrity is maintained or improved  Description: INTERVENTIONS:  - Identify patients at risk for skin breakdown  - Assess and monitor skin integrity  - Assess and monitor nutrition and hydration status  - Monitor labs   - Assess for incontinence   - Turn and reposition patient  - Assist with mobility/ambulation  - Relieve pressure over bony prominences  - Avoid friction and shearing  - Provide appropriate hygiene as needed including keeping skin clean and dry  - Evaluate need for skin moisturizer/barrier cream  - Collaborate with interdisciplinary team   - Patient/family teaching  - Consider wound care consult   Outcome: Progressing     Problem: Nutrition/Hydration-ADULT  Goal: Nutrient/Hydration intake appropriate for improving, restoring or maintaining nutritional needs  Description: Monitor and assess patient's nutrition/hydration status for malnutrition  Collaborate with interdisciplinary team and initiate plan and interventions as ordered  Monitor patient's weight and dietary intake as ordered or per policy  Utilize nutrition screening tool and intervene as necessary  Determine patient's food preferences and provide high-protein, high-caloric foods as appropriate       INTERVENTIONS:  - Monitor oral intake, urinary output, labs, and treatment plans  - Assess nutrition and hydration status and recommend course of action  - Evaluate amount of meals eaten  - Assist patient with eating if necessary   - Allow adequate time for meals  - Recommend/ encourage appropriate diets, oral nutritional supplements, and vitamin/mineral supplements  - Order, calculate, and assess calorie counts as needed  - Recommend, monitor, and adjust tube feedings and TPN/PPN based on assessed needs  - Assess need for intravenous fluids  - Provide specific nutrition/hydration education as appropriate  - Include patient/family/caregiver in decisions related to nutrition  Outcome: Progressing     Problem: NEUROSENSORY - ADULT  Goal: Achieves stable or improved neurological status  Description: INTERVENTIONS  - Monitor and report changes in neurological status  - Monitor vital signs such as temperature, blood pressure, glucose, and any other labs ordered   - Initiate measures to prevent increased intracranial pressure  - Monitor for seizure activity and implement precautions if appropriate      Outcome: Progressing  Goal: Achieves maximal functionality and self care  Description: INTERVENTIONS  - Monitor swallowing and airway patency with patient fatigue and changes in neurological status  - Encourage and assist patient to increase activity and self care     - Encourage visually impaired, hearing impaired and aphasic patients to use assistive/communication devices  Outcome: Progressing     Problem: CARDIOVASCULAR - ADULT  Goal: Maintains optimal cardiac output and hemodynamic stability  Description: INTERVENTIONS:  - Monitor I/O, vital signs and rhythm  - Monitor for S/S and trends of decreased cardiac output  - Administer and titrate ordered vasoactive medications to optimize hemodynamic stability  - Assess quality of pulses, skin color and temperature  - Assess for signs of decreased coronary artery perfusion  - Instruct patient to report change in severity of symptoms  Outcome: Progressing  Goal: Absence of cardiac dysrhythmias or at baseline rhythm  Description: INTERVENTIONS:  - Continuous cardiac monitoring, vital signs, obtain 12 lead EKG if ordered  - Administer antiarrhythmic and heart rate control medications as ordered  - Monitor electrolytes and administer replacement therapy as ordered  Outcome: Progressing     Problem: RESPIRATORY - ADULT  Goal: Achieves optimal ventilation and oxygenation  Description: INTERVENTIONS:  - Assess for changes in respiratory status  - Assess for changes in mentation and behavior  - Position to facilitate oxygenation and minimize respiratory effort  - Oxygen administered by appropriate delivery if ordered  - Initiate smoking cessation education as indicated  - Encourage broncho-pulmonary hygiene including cough, deep breathe, Incentive Spirometry  - Assess the need for suctioning and aspirate as needed  - Assess and instruct to report SOB or any respiratory difficulty  - Respiratory Therapy support as indicated  Outcome: Progressing     Problem: GASTROINTESTINAL - ADULT  Goal: Maintains or returns to baseline bowel function  Description: INTERVENTIONS:  - Assess bowel function  - Encourage oral fluids to ensure adequate hydration  - Administer IV fluids if ordered to ensure adequate hydration  - Administer ordered medications as needed  - Encourage mobilization and activity  - Consider nutritional services referral to assist patient with adequate nutrition and appropriate food choices  Outcome: Progressing  Goal: Maintains adequate nutritional intake  Description: INTERVENTIONS:  - Monitor percentage of each meal consumed  - Identify factors contributing to decreased intake, treat as appropriate  - Assist with meals as needed  - Monitor I&O, weight, and lab values if indicated  - Obtain nutrition services referral as needed  Outcome: Progressing     Problem: GENITOURINARY - ADULT  Goal: Maintains or returns to baseline urinary function  Description: INTERVENTIONS:  - Assess urinary function  - Encourage oral fluids to ensure adequate hydration if ordered  - Administer IV fluids as ordered to ensure adequate hydration  - Administer ordered medications as needed  - Offer frequent toileting  - Follow urinary retention protocol if ordered  Outcome: Progressing  Goal: Absence of urinary retention  Description: INTERVENTIONS:  - Assess patients ability to void and empty bladder  - Monitor I/O  - Bladder scan as needed  - Discuss with physician/AP medications to alleviate retention as needed  - Discuss catheterization for long term situations as appropriate  Outcome: Progressing     Problem: METABOLIC, FLUID AND ELECTROLYTES - ADULT  Goal: Electrolytes maintained within normal limits  Description: INTERVENTIONS:  - Monitor labs and assess patient for signs and symptoms of electrolyte imbalances  - Administer electrolyte replacement as ordered  - Monitor response to electrolyte replacements, including repeat lab results as appropriate  - Instruct patient on fluid and nutrition as appropriate  Outcome: Progressing  Goal: Fluid balance maintained  Description: INTERVENTIONS:  - Monitor labs   - Monitor I/O and WT  - Instruct patient on fluid and nutrition as appropriate  - Assess for signs & symptoms of volume excess or deficit  Outcome: Progressing     Problem: SKIN/TISSUE INTEGRITY - ADULT  Goal: Skin Integrity remains intact(Skin Breakdown Prevention)  Description: Assess:  -Perform Alejandro assessment every 12  -Clean and moisturize skin every 4  -Inspect skin when repositioning, toileting, and assisting with ADLS  -Assess extremities for adequate circulation and sensation     Bed Management:  -Have minimal linens on bed & keep smooth, unwrinkled  -Change linens as needed when moist or perspiring  -Avoid sitting or lying in one position for more than 2 hours while in bed  -Keep HOB at 20degrees     Toileting:  -Offer bedside commode  -Assess for incontinence every 2    Activity:  -Mobilize patient 3 times a day  -Encourage activity and walks on unit  -Encourage or provide ROM exercises   -Turn and reposition patient every 2 Hours  -Use appropriate equipment to lift or move patient in bed  -Instruct/ Assist with weight shifting every 20 hours when out of bed in chair  -Consider limitation of chair time 5 hour intervals    Skin Care:  -Avoid use of baby powder, tape, friction and shearing, hot water or constrictive clothing  -Relieve pressure over bony prominences using allevyns  -Do not massage red bony areas  Outcome: Progressing  Goal: Incision(s), wounds(s) or drain site(s) healing without S/S of infection  Description: INTERVENTIONS  - Assess and document dressing, incision, wound bed, drain sites and surrounding tissue  - Provide patient and family education  - Perform skin care/dressing changes every shift  Outcome: Progressing     Problem: HEMATOLOGIC - ADULT  Goal: Maintains hematologic stability  Description: INTERVENTIONS  - Assess for signs and symptoms of bleeding or hemorrhage  - Monitor labs  - Administer supportive blood products/factors as ordered and appropriate  Outcome: Progressing     Problem: MUSCULOSKELETAL - ADULT  Goal: Maintain or return mobility to safest level of function  Description: INTERVENTIONS:  - Assess patient's ability to carry out ADLs; assess patient's baseline for ADL function and identify physical deficits which impact ability to perform ADLs (bathing, care of mouth/teeth, toileting, grooming, dressing, etc )  - Assess/evaluate cause of self-care deficits   - Assess range of motion  - Assess patient's mobility  - Assess patient's need for assistive devices and provide as appropriate  - Encourage maximum independence but intervene and supervise when necessary  - Involve family in performance of ADLs  - Assess for home care needs following discharge   - Consider OT consult to assist with ADL evaluation and planning for discharge  - Provide patient education as appropriate  Outcome: Progressing     Problem: COPING  Goal: Pt/Family able to verbalize concerns and demonstrate effective coping strategies  Description: INTERVENTIONS:  - Assist patient/family to identify coping skills, available support systems and cultural and spiritual values  - Provide emotional support, including active listening and acknowledgement of concerns of patient and caregivers  - Reduce environmental stimuli, as able  - Provide patient education  - Assess for spiritual pain/suffering and initiate spiritual care, including notification of Pastoral Care or [place] based community as needed  - Assess effectiveness of coping strategies  Outcome: Progressing  Goal: Will report anxiety at manageable levels  Description: INTERVENTIONS:  - Administer medication as ordered  - Teach and encourage coping skills  - Provide emotional support  - Assess patient/family for anxiety and ability to cope  Outcome: Progressing     Problem: PAIN - ADULT  Goal: Verbalizes/displays adequate comfort level or baseline comfort level  Description: Interventions:  - Encourage patient to monitor pain and request assistance  - Assess pain using appropriate pain scale  - Administer analgesics based on type and severity of pain and evaluate response  - Implement non-pharmacological measures as appropriate and evaluate response  - Consider cultural and social influences on pain and pain management  - Notify physician/advanced practitioner if interventions unsuccessful or patient reports new pain  Outcome: Progressing     Problem: INFECTION - ADULT  Goal: Absence or prevention of progression during hospitalization  Description: INTERVENTIONS:  - Assess and monitor for signs and symptoms of infection  - Monitor lab/diagnostic results  - Monitor all insertion sites, i e  indwelling lines, tubes, and drains  - Monitor endotracheal if appropriate and nasal secretions for changes in amount and color  - [place] appropriate cooling/warming therapies per order  - Administer medications as ordered  - Instruct and encourage patient and family to use good hand hygiene technique  - Identify and instruct in appropriate isolation precautions for identified infection/condition  Outcome: Progressing     Problem: SAFETY ADULT  Goal: Patient will remain free of falls  Description: INTERVENTIONS:  - Educate patient/family on patient safety including physical limitations  - Instruct patient to call for assistance with activity   - Consult OT/PT to assist with strengthening/mobility   - Keep Call bell within reach  - Keep bed low and locked with side rails adjusted as appropriate  - Keep care items and personal belongings within reach  - Initiate and maintain comfort rounds  - Make Fall Risk Sign visible to staff  - Offer Toileting every 2 Hours, in advance of need  - Initiate/Maintain bed alarm  - Apply yellow socks and bracelet for high fall risk patients  - Consider moving patient to room near nurses station  Outcome: Progressing  Goal: Maintain or return to baseline ADL function  Description: INTERVENTIONS:  -  Assess patient's ability to carry out ADLs; assess patient's baseline for ADL function and identify physical deficits which impact ability to perform ADLs (bathing, care of mouth/teeth, toileting, grooming, dressing, etc )  - Assess/evaluate cause of self-care deficits   - Assess range of motion  - Assess patient's mobility; develop plan if impaired  - Assess patient's need for assistive devices and provide as appropriate  - Encourage maximum independence but intervene and supervise when necessary  - Involve family in performance of ADLs  - Assess for home care needs following discharge   - Consider OT consult to assist with ADL evaluation and planning for discharge  - Provide patient education as appropriate  Outcome: Progressing  Goal: Maintains/Returns to pre admission functional level  Description: INTERVENTIONS:  - Perform BMAT or MOVE assessment daily    - Set and communicate daily mobility goal to care team and patient/family/caregiver  - Collaborate with rehabilitation services on mobility goals if consulted  - Perform Range of Motion 3 times a day    - Reposition patient every 2 hours  - Dangle patient 3 times a day  - Stand patient 3 times a day  - Ambulate patient 3 times a day  - Out of bed to chair 3 times a day   - Out of bed for meals 3 times a day  - Out of bed for toileting  - Record patient progress and toleration of activity level   Outcome: Progressing     Problem: DISCHARGE PLANNING  Goal: Discharge to home or other facility with appropriate resources  Description: INTERVENTIONS:  - Identify barriers to discharge w/patient and caregiver  - Arrange for needed discharge resources and transportation as appropriate  - Identify discharge learning needs (meds, wound care, etc )  - Arrange for interpretive services to assist at discharge as needed  - Refer to Case Management Department for coordinating discharge planning if the patient needs post-hospital services based on physician/advanced practitioner order or complex needs related to functional status, cognitive ability, or social support system  Outcome: Progressing     Problem: Knowledge Deficit  Goal: Patient/family/caregiver demonstrates understanding of disease process, treatment plan, medications, and discharge instructions  Description: Complete learning assessment and assess knowledge base    Interventions:  - Provide teaching at level of understanding  - Provide teaching via preferred learning methods  Outcome: Progressing

## 2022-05-31 NOTE — PROGRESS NOTES
Patient seen and examined    His right upper extremity was weaker than it had been  There is a mild persons of the right lower extremity but he is ambulatory  CT scan of the brain demonstrated large region of pneumocephalus which actually caused an expansion of the subdural space and increasing midline shift    Both drains assessed and were functional     Frontal drain was aspirated and then removed  The ex site was reinforced with a additional 30 nylon suture  Patient tolerate this well    Spoke with family and spoke with Critical Care

## 2022-05-31 NOTE — PROGRESS NOTES
Daily Progress Note - Critical Care   Nelsy Higgins 79 y o  male MRN: [id number]  Unit/Bed#: ICU 11 Encounter: [id number]        ----------------------------------------------------------------------------------------  HPI/24hr events: No acute overnight events  Patient was started on melatonin      ---------------------------------------------------------------------------------------  SUBJECTIVE  Hx limited secondary to patient deaf    Review of Systems   Unable to perform ROS: Other   Constitutional:        Patient deaf     Review of systems was unable to be performed secondary to deafness  ---------------------------------------------------------------------------------------  Assessment and Plan:  Neuro:   · Diagnosis: Acute on chronic SDH, status post and left craniotomy on 5/28  · CT showed large region of pneumocephalus and expansion of subdural space and increased midline shift  · L Subdural drains: 200 cc  ? Plan:  § Follow-up a m  Head CT  § Q 1 hour neuro checks, CT head for GCS changes  § CAM-ICU  § Seizure prophylaxis Keppra 500 mg BID day 5/7  § Parietal drain intact, monitor output  § Keep head 30 degrees  § P r n  Pain control:  Tylenol, Oxy 2 5/5 mg q 4 hours for moderate to severe pain  § Melatonin 6 mg       CV:   · Diagnosis: No acute issues  ? Plan:   § Continuous cardiac monitoring  § Continue pravastatin 40 mg  § Maintain maps greater than 65              Pulm:    · Diagnosis: No acute issues  ? Plan:   § Maintain saturations greater than 92%        GI:   · Diagnosis: No acute issues  ? Plan:   § Regular diet  § Bowel regimen  Colace 100 mg b i d , MiraLax daily and senna daily        : UOP: 3 45L  · Diagnosis: Urinary retention s/p straight catheterization  ? Plan:   § Urinary retention protocol  §  flomax started  § Strict I's/O's  § Monitor BUN/creatinine      F/E/N:   · Plan:   ? Fluids: off fluids  ? Electrolytes; within normal limits replete as needed  ?  Nutrition:  Regular diet as tolerated        Heme/Onc:   · Diagnosis: no acute issues  ? Plan:   § Continue to hold chemical DVT prophylaxis Neurosurgery  § Monitor hemoglobin  § SQDs        Endo:   · Diagnosis: No acute issues  ? Plan:   § Maintain -180           ID:   · Diagnosis: No acute issues  ? Plan:   § Monitor fever and white blood cell curve        MSK/Skin:   · Diagnosis: No acute issues  ? Plan:  § Encourage out of bed to chair  § PT/OT        Disposition: Transfer to Stepdown Level 2  Code Status: Level 1 - Full Code  ---------------------------------------------------------------------------------------  ICU CORE MEASURES    Prophylaxis   VTE Pharmacologic Prophylaxis: Pharmacologic VTE Prophylaxis contraindicated due to subdural  VTE Mechanical Prophylaxis: sequential compression device  Stress Ulcer Prophylaxis: Prophylaxis Not Indicated     ABCDE Protocol (if indicated)  Plan to perform spontaneous awakening trial today? Not applicable  Plan to perform spontaneous breathing trial today? Not applicable  Obvious barriers to extubation? Not applicable  CAM-ICU: Negative    Invasive Devices Review  Invasive Devices  Report    Peripheral Intravenous Line  Duration           Peripheral IV 22 Right Hand 2 days          Drain  Duration           Ventriculostomy/Subdural Subdural drainage catheter Left Parietal region 2 days    Urethral Catheter 16 Fr  <1 day              Can any invasive devices be discontinued today?  No  ---------------------------------------------------------------------------------------  OBJECTIVE    Vitals   Vitals:    22 0300 22 0400 22 0500 22 0600   BP: 122/68 115/75 104/64 103/63   BP Location:  Right arm     Pulse: 68 90 68 70   Resp: 12 (!) 23 14 16   Temp:  98 7 °F (37 1 °C)     TempSrc:  Oral     SpO2: 99% 96% 94% 96%   Weight:       Height:         Temp (24hrs), Av 6 °F (37 °C), Min:98 3 °F (36 8 °C), Max:99 °F (37 2 °C)  Current: Temperature: 98 7 °F (37 1 °C)  HR: 68  BP: 111/71  RR: 17  SpO2: 98    Respiratory:  SpO2: SpO2: 96 %  Nasal Cannula O2 Flow Rate (L/min): 2 L/min    Invasive/non-invasive ventilation settings   Respiratory  Report   Lab Data (Last 4 hours)    None         O2/Vent Data (Last 4 hours)    None                Physical Exam  Vitals reviewed  Constitutional:       Appearance: Normal appearance  HENT:      Head: Normocephalic  Comments: Parietal left subdural drain     Nose: Nose normal    Eyes:      Conjunctiva/sclera: Conjunctivae normal       Pupils: Pupils are equal, round, and reactive to light  Cardiovascular:      Rate and Rhythm: Normal rate and regular rhythm  Pulses: Normal pulses  Heart sounds: Normal heart sounds  Pulmonary:      Effort: Pulmonary effort is normal       Breath sounds: Normal breath sounds  Abdominal:      Palpations: Abdomen is soft  Tenderness: There is no abdominal tenderness  Musculoskeletal:         General: Normal range of motion  Skin:     General: Skin is warm and dry  Neurological:      Mental Status: He is alert  Motor: Weakness (4/5 strength RUE) present        Comments: Limited as patient is deaf             Laboratory and Diagnostics:  Results from last 7 days   Lab Units 05/31/22  0615 05/30/22  0512 05/29/22  0506 05/28/22  0512 05/27/22  1600   WBC Thousand/uL 8 22 7 69 9 14 4 94 5 73   HEMOGLOBIN g/dL 13 5 13 1 12 0 13 1 14 2   HEMATOCRIT % 40 4 39 2 35 5* 39 7 42 6   PLATELETS Thousands/uL 392* 416* 375 330 380   NEUTROS PCT %  --  74  --  66 66   MONOS PCT %  --  10  --  11 11     Results from last 7 days   Lab Units 05/31/22  0615 05/30/22  0512 05/29/22  1437 05/29/22  0506 05/28/22  0512 05/27/22  1600   SODIUM mmol/L 135* 137 139 135* 138 139   POTASSIUM mmol/L 4 2 4 0 3 9 4 2 4 0 4 2   CHLORIDE mmol/L 105 107 105 106 109* 107   CO2 mmol/L 27 24 28 23 26 25   ANION GAP mmol/L 3* 6 6 6 3* 7   BUN mg/dL 13 13 15 13 14 15   CREATININE mg/dL 0 55* 0 65 0 79 0 61 0  62 0 78   CALCIUM mg/dL 9 1 9 2 9 4 8 8 8 9 9 0   GLUCOSE RANDOM mg/dL 122 105 104 105 92 94   ALT U/L  --   --   --   --  29 30   AST U/L  --   --   --   --  22 18   ALK PHOS U/L  --   --   --   --  77 79   ALBUMIN g/dL  --   --   --   --  3 2* 3 6   TOTAL BILIRUBIN mg/dL  --   --   --   --  0 58 0 52     Results from last 7 days   Lab Units 05/30/22  0512 05/29/22  0506 05/28/22  0512   MAGNESIUM mg/dL 2 2 2 2 2 4   PHOSPHORUS mg/dL 2 9  --  3 4      Results from last 7 days   Lab Units 05/29/22  0506 05/28/22  0512 05/27/22  1600   INR  1 10 1 04 1 06   PTT seconds 28 -- 30              ABG:    VBG:          Micro        EKG:   Imaging:  I have personally reviewed pertinent reports  Intake and Output  I/O       05/29 0701 05/30 0700 05/30 0701 05/31 0700    P  O  770 600    I V  (mL/kg) 400 (5 1)     IV Piggyback 100 100    Total Intake(mL/kg) 1270 (16 1) 700 (8 9)    Urine (mL/kg/hr) 3450 (1 8) 1245 (0 7)    Drains 152 112    Total Output 3602 1357    Net -2332 -657              UOP: 1665 ml, net negative 965    Height and Weights   Height: 5' 11" (180 3 cm)  IBW (Ideal Body Weight): 75 3 kg  Body mass index is 24 23 kg/m²  Weight (last 2 days)     None            Nutrition       Diet Orders   (From admission, onward)             Start     Ordered    05/28/22 1145  Diet Regular; Regular House  Diet effective now        References:    Nutrtion Support Algorithm Enteral vs  Parenteral   Question Answer Comment   Diet Type Regular    Regular Regular House    RD to adjust diet per protocol?  No        05/28/22 1144                    Active Medications  Scheduled Meds:  Current Facility-Administered Medications   Medication Dose Route Frequency Provider Last Rate    acetaminophen  650 mg Oral Q6H PRN RONY Ordoñez      docusate sodium  100 mg Oral BID RONY Ordoñez      levETIRAcetam  500 mg Oral Q12H [facility] RONY Ordoñez      melatonin  6 mg Oral HS Min Dia Kidney, MD      ondansetron  4 mg Intravenous Q6H PRN RONY Bedolla      oxyCODONE  5 mg Oral Q4H PRN RONY Bedolla      Or    oxyCODONE  2 5 mg Oral Q4H PRN RONY Bedolla      polyethylene glycol  17 g Oral Daily RONY Bedolla      pravastatin  40 mg Oral Daily With RONY Hernandez      senna  1 tablet Oral Daily RONY Bedolla      tamsulosin  0 4 mg Oral Daily With RONY Hernandez       Continuous Infusions:     PRN Meds:   acetaminophen, 650 mg, Q6H PRN  ondansetron, 4 mg, Q6H PRN  oxyCODONE, 5 mg, Q4H PRN   Or  oxyCODONE, 2 5 mg, Q4H PRN        Allergies   No Known Allergies  ---------------------------------------------------------------------------------------  Advance Directive and Living Will:      Power of :    POLST:    ---------------------------------------------------------------------------------------    Marques Sood MD      Portions of the record may have been created with voice recognition software  Occasional wrong word or "sound a like" substitutions may have occurred due to the inherent limitations of voice recognition software    Read the chart carefully and recognize, using context, where substitutions have occurred

## 2022-05-31 NOTE — PROGRESS NOTES
05/31/22 1500   Clinical Encounter Type   Visited With Patient and family together   Sacramental Encounters   Communion Given Indicator Yes   Sacrament of Sick-Anointing Anointed   Sacrament Other   (Yohan Goetz)

## 2022-05-31 NOTE — PROGRESS NOTES
[address] [facility]  Progress Note - Alexandria Gutierrez 1940, 79 y o  male MRN: [id number]  Unit/Bed#: ICU 11 Encounter: [id number]  Primary Care Provider: Trinidad Estevez DO   Date and time admitted to hospital: 5/27/2022  3:04 PM    * SDH (subdural hematoma) [facility])  Assessment & Plan  POD3 left craniotomy for SDH evacuation  · Patient presented to the ED due to findings on CT scan of an acute on chronic SDH  · Very hard of hearing  · On exam, RLE weakness 4-/5  Otherwise, neuro intact and GCS 15      Imaging:  · CT head w/o, 5/31/22: Left subdural collection that is mostly due to pneumocephalus and fluid is overall stable in size following removal of one of 2 subdural drains but small amount of residual subdural blood products mildly increased    Stable mass effect with 1 cm of left-to-right shift    Plan:  · Continue frequent neurological checks  · MRA reviewed and shows occlusion of MMA  However, after review with neuroendovascular attending, this may be secondary to pneumocephalus  Will continue drain and consider MMA embolization Thursday if patient/family is interested  · 1 SDD to -10mmHg, 140Ss drainage p24h  · Continue drain at this time  · HOB < 30 degrees until removed, ok to sit up to eat  · STAT CT head with any neurological decline including drop GCS of 2pts within 1 hr   · Repeat CT head Thursday AM   · SBP <160mmHg  · Keppra 500mg x7 days post op  · Hold all antiplatelet and anticoagulation medications  · Patient does not take any home AC/AP at baseline  · Medical management and pain control per primary team  · Mobilize with PT/OT when medically appropriate  · DVT PPX: SCDs only at this time  Can start [facility] after SDD removed    Neurosurgery will continue to follow  Call with questions/concerns  Subjective/Objective   Chief Complaint: none    Subjective:  Patient with no acute events overnight    One subdural drain was removed yesterday and reinforced with drain stitch  No drainage seen from incisions or drain site today  Second subdural drain in place and appears to be draining adequately  Patient is tolerating p o  Diet  Denies headache, dizziness, nausea, vomiting, confusion  Continues with right-sided weakness, right lower extremity is slightly weaker than right upper extremity  Objective:  Patient is sitting in bed in no acute distress  Vital signs stable  Subdural drain to -10  Intake/Output                 05/31/22 0701 - 06/01/22 0700     0746-1298 1230-2801 Total              Intake    P O   160  -- 160    Total Intake 160 -- 160       Output    Urine  95  -- 95    Output (mL) (Urethral Catheter 16 Fr ) 95 -- 95    Total Output 95 -- 95       Net I/O     65 -- 65          Invasive Devices  Report    Peripheral Intravenous Line  Duration           Peripheral IV 05/28/22 Right Hand 2 days          Drain  Duration           Ventriculostomy/Subdural Subdural drainage catheter Left Parietal region 2 days    Urethral Catheter 16 Fr  <1 day                Vitals: Blood pressure 115/73, pulse 74, temperature 98 4 °F (36 9 °C), temperature source Oral, resp  rate 15, height 5' 11" (1 803 m), weight 78 8 kg (173 lb 11 6 oz), SpO2 96 %  ,Body mass index is 24 23 kg/m²  General appearance: alert, appears stated age, cooperative and no distress  Head: Normocephalic, left craniotomy incision CDI  1 SDD in place  Eyes: EOMI, PERRL, conjugate gaze  Lungs: non labored breathing  Heart: regular heart rate  Neurologic:   Mental status: Alert, oriented x3, thought content appropriate  Follows commands  Cranial nerves: grossly intact (Cranial nerves II-XII)  Very [place]  Sensory: normal to LT x4  Motor: moving all extremities  4-/5 RLE weakness  RUE full strength with effort  Coordination: finger to nose normal bilaterally, no drift bilaterally    Lab Results: I have personally reviewed pertinent results        Results from last 7 days   Lab Units 05/31/22  0615 05/30/22  0512 05/29/22  0506 05/28/22  0512 05/27/22  1600   WBC Thousand/uL 8 22 7 69 9 14 4 94 5 73   HEMOGLOBIN g/dL 13 5 13 1 12 0 13 1 14 2   HEMATOCRIT % 40 4 39 2 35 5* 39 7 42 6   PLATELETS Thousands/uL 392* 416* 375 330 380   NEUTROS PCT %  --  74  --  66 66   MONOS PCT %  --  10  --  11 11     Results from last 7 days   Lab Units 05/31/22  0615 05/30/22  0512 05/29/22  1437 05/29/22  0506 05/28/22  0512 05/27/22  1600   POTASSIUM mmol/L 4 2 4 0 3 9   < > 4 0 4 2   CHLORIDE mmol/L 105 107 105   < > 109* 107   CO2 mmol/L 27 24 28   < > 26 25   BUN mg/dL 13 13 15   < > 14 15   CREATININE mg/dL 0 55* 0 65 0 79   < > 0 62 0 78   CALCIUM mg/dL 9 1 9 2 9 4   < > 8 9 9 0   ALK PHOS U/L  --   --   --   --  77 79   ALT U/L  --   --   --   --  29 30   AST U/L  --   --   --   --  22 18    < > = values in this interval not displayed  Results from last 7 days   Lab Units 05/30/22  0512 05/29/22  0506 05/28/22  0512   MAGNESIUM mg/dL 2 2 2 2 2 4     Results from last 7 days   Lab Units 05/30/22  0512 05/28/22  0512   PHOSPHORUS mg/dL 2 9 3 4     Results from last 7 days   Lab Units 05/29/22  0506 05/28/22  0512 05/27/22  1600   INR  1 10 1 04 1 06   PTT seconds 28  --  30     No results found for: TROPONINT  ABG:No results found for: PHART, KVB6LQJ, PO2ART, GAH2OOH, M1LPKRYW, BEART, SOURCE    Imaging Studies: I have personally reviewed pertinent reports  and I have personally reviewed pertinent films in PACS     XR chest 1 view portable    Result Date: 5/29/2022  Narrative: CHEST INDICATION:   pre-op  COMPARISON:  None EXAM PERFORMED/VIEWS:  XR CHEST PORTABLE FINDINGS: Cardiomediastinal silhouette appears unremarkable  Low lung volumes with mild bibasilar atelectasis  No effusion or pneumothorax  Osseous structures appear within normal limits for patient age  Impression: Mild bibasilar atelectasis   Workstation performed: [id number]     CT head wo contrast    Result Date: 5/31/2022  Narrative: CT BRAIN - WITHOUT CONTRAST INDICATION:   Subdural hematoma Drain removal and repositioning  Eval of pneumocephalus and subdural collection  COMPARISON:  5/30/2022  TECHNIQUE:  CT examination of the brain was performed  In addition to axial images, sagittal and coronal 2D reformatted images were created and submitted for interpretation  Radiation dose length product (DLP) for this visit:  943 75 mGy-cm   This examination, like all CT scans performed in the [facility], was performed utilizing techniques to minimize radiation dose exposure, including the use of iterative  reconstruction and automated exposure control  IMAGE QUALITY:  Diagnostic  FINDINGS: PARENCHYMA: Left parietal craniotomy for evacuation of subdural is again seen  The subdural drain overlying the right frontal convexity has been removed  The posterior subdural drain is unchanged in position    Left subdural collection that is pneumocephalus and fluid is overall stable in size but small amount of residual subdural blood products is mildly increased    Stable mass effect with 1 cm of left-to-right shift  No parenchymal hemorrhage  No acute infarct  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  Stable mild chronic microangiopathy  VENTRICLES:  No hydrocephalus  VISUALIZED ORBITS AND PARANASAL SINUSES:  Orbits are unremarkable  Mild scattered mucosal thickening  CALVARIUM AND EXTRACRANIAL SOFT TISSUES:  Postsurgical changes status post left craniotomy  Impression: Left subdural collection that is mostly due to pneumocephalus and fluid is overall stable in size following removal of one of 2 subdural drains but small amount of residual subdural blood products mildly increased     Stable mass effect with 1 cm of left-to-right shift Workstation performed: [id number]     CT head wo contrast    Result Date: 5/30/2022  Narrative: CT BRAIN - WITHOUT CONTRAST INDICATION:   Subdural hematoma evaluation of right SDH   COMPARISON: Head CT 5/27/2022 TECHNIQUE:  CT examination of the brain was performed  In addition to axial images, sagittal and coronal 2D reformatted images were created and submitted for interpretation  Radiation dose length product (DLP) for this visit:  915 84 mGy-cm   This examination, like all CT scans performed in the [facility], was performed utilizing techniques to minimize radiation dose exposure, including the use of iterative  reconstruction and automated exposure control  IMAGE QUALITY:  Diagnostic  FINDINGS: PARENCHYMA AND EXTRA-AXIAL SPACES: Interval left sided craniotomy for evacuation of large left hemispheric subdural hematoma  There are 2 (frontal and temporal) subdural drains in place  There is large pneumocephalus primarily within the collection and residual subdural blood  There is  interval worsening of left-to-right midline shift mainly due to pneumocephalus  It measures 10 mm, previously 8 mm  No acute parenchymal hemorrhage  Basal cisterns are patent  VENTRICLES: Normal for the patient's age  VISUALIZED ORBITS AND PARANASAL SINUSES:  Unremarkable  CALVARIUM AND EXTRACRANIAL SOFT TISSUES:  Soft tissue emphysema in the left temple and infratemporal regions  Impression: Interval left craniotomy and evacuation of large left hemispheric subdural hematoma with subdural drains in place  Large pneumocephalus primarily within the collection with residual subdural blood  Interval worsening left-to-right midline shift measuring 10 mm (previously 8 mm), primarily due to large pneumocephalus within the collection  I personally discussed this study with Hina Romero on 5/30/2022 at 8:20 AM  Workstation performed: [id number]     CT head wo contrast    Result Date: 5/27/2022  Narrative: CT BRAIN - WITHOUT CONTRAST INDICATION:   D18 1: Lymphangioma, any site  Subdural hygroma  Subdural hematoma  Follow-up   COMPARISON:  5/10/2022 and 4/19/2022 TECHNIQUE:  CT examination of the brain was performed  In addition to axial images, sagittal and coronal 2D reformatted images were created and submitted for interpretation  Radiation dose length product (DLP) for this visit:  848  63 mGy-cm   This examination, like all CT scans performed in the [facility], was performed utilizing techniques to minimize radiation dose exposure, including the use of iterative  reconstruction and automated exposure control  IMAGE QUALITY:  Diagnostic  FINDINGS: PARENCHYMA: Left convexity subdural collection has increased in size and density, 2 0 cm maximal depth (1 3 cm 5/10/2022, measured in a similar fashion today by this reader)  Increased sulcal effacement and 0 8 cm left to right midline shift  No parenchymal hemorrhage Gray-white matter differentiation preserved  Nonspecific, similar patchy periventricular and subcortical white matter lucencies most commonly related to changes of microangiopathy  Vascular calcification  VENTRICLES AND EXTRA-AXIAL SPACES:  Mild effacement of the left lateral ventricle  No obstructive hydrocephalus  VISUALIZED ORBITS AND PARANASAL SINUSES: Mild left maxillary mucosal thickening  Globes and orbits are within normal limits  CALVARIUM AND EXTRACRANIAL SOFT TISSUES:  Within normal limits  Mastoid air cells are well aerated  I personally discussed this study with Alli Shah on 5/27/2022 at 11:03 AM      Impression: Increasing acute on chronic subdural hematoma compared to prior studies with  increasing mass effect and midline shift  Workstation performed: [id number]     CT head wo contrast    Result Date: 5/11/2022  Narrative: CT BRAIN - WITHOUT CONTRAST INDICATION:   D18 1: Lymphangioma, any site  COMPARISON:  Prior CT April 19, 2022 TECHNIQUE:  CT examination of the brain was performed  In addition to axial images, sagittal and coronal 2D reformatted images were created and submitted for interpretation   Radiation dose length product (DLP) for this visit: 836 56 mGy-cm   This examination, like all CT scans performed in the [facility], was performed utilizing techniques to minimize radiation dose exposure, including the use of iterative  reconstruction and automated exposure control  IMAGE QUALITY:  Diagnostic  FINDINGS: PARENCHYMA: Decreased attenuation is noted in periventricular and subcortical white matter demonstrating an appearance that is statistically most likely to represent moderate microangiopathic change  No CT signs of acute infarction  No intracranial mass, mass effect or midline shift  No acute parenchymal hemorrhage  VENTRICLES AND EXTRA-AXIAL SPACES:  Normal for the patient's age  VISUALIZED ORBITS AND PARANASAL SINUSES:  Unremarkable  CALVARIUM AND EXTRACRANIAL SOFT TISSUES:  Normal      Impression: Increase size of left hemispheric low dense extra-axial collection most compatible with chronic subdural hygroma  Chronic subdural hematoma less likely  New 4 mm left to right shift is noted  Resolution of right hemispheric low dense collection  The study was marked in EPIC for significant notification  Workstation performed: [id number]     MRA head wo contrast    Result Date: 5/29/2022  Narrative: MRA BRAIN WITHOUT CONTRAST INDICATION:  SDH COMPARISON:  Head CT 5/27/2022 TECHNIQUE:  Axial 3-D time-of-flight imaging with 3-D reconstructions performed without contrast    IV Contrast:  Not administered  FINDINGS: IMAGE QUALITY:  Diagnostic  ANATOMY INTERNAL CAROTID ARTERIES:  Normal flow related signal of the distal cervical, petrous and cavernous segments of the internal carotid arteries  Normal ICA terminus  ANTERIOR CIRCULATION:  Normal A1 segments  Normal anterior communicating artery  Normal flow-related signal of the anterior cerebral arteries  MIDDLE CEREBRAL ARTERY CIRCULATION:  Patent left M1 and M2 branches which are displaced by mass effect from subdural collection    There is diminished flow related signal in the distal branches likely secondary to mass effect  Unremarkable right M1 segment and major M2 branches  DISTAL VERTEBRAL ARTERIES:  Distal vertebral arteries are patient with a normal vertebrobasilar junction  The posterior inferior cerebellar artery origins are normal  BASILAR ARTERY:  Unremarkable  Proximal basilar artery fenestration  POSTERIOR CEREBRAL ARTERIES:  There is fetal origin of the right posterior cerebral artery  The left posterior cerebral artery arises from the basilar tip  Both demonstrate no focal stenosis  Absent/hypoplastic left posterior communicating artery  ADDITIONAL FINDINGS: Interval left craniotomy for subdural hematoma evacuation with pneumocephalus and residual layering subdural fluid/blood  Subdural drain is in place  Persistent mass effect and 9 mm left-to-right midline shift  Impression: 1  Patent major vasculature of [place]uni Nieves  No AV malformation or aneurysm  2   Diminished flow related signal in the distal left MCA branches likely secondary to mass effect from subdural collection  3   Interval left craniotomy for subdural hematoma evacuation with pneumocephalus and residual layering subdural fluid/blood  Subdural drain is in place  Persistent mass effect and 9 mm left-to-right midline shift  Workstation performed: [id number]     MRA carotids wo contrast    Result Date: 5/29/2022  Narrative: MRA NECK WITHOUT CONTRAST INDICATION: SDH COMPARISON:  None  TECHNIQUE:  2D and 3D Time of Flight angiography with 3D reconstructions  IMAGE QUALITY:  Diagnostic  FINDINGS: AORTIC ARCH:  Normal  VISUALIZED SUBCLAVIAN ARTERIES:  The subclavian arteries demonstrate normal flow-related enhancement with no stenosis  VERTEBRAL ARTERIES:  Normal vertebral artery origins  The vertebral arteries are bilaterally symmetric with normal enhancement and no evidence of stenosis  RIGHT CAROTID ARTERY:  Normal common carotid artery, cervical internal carotid artery and external carotid artery   No stenosis at the bifurcation  LEFT CAROTID ARTERY:  Mild smooth narrowing of the proximal internal carotid artery, less than 50%  VISUALIZED INTRACRANIAL VASCULATURE:  Limited visualization of the intracranial vasculature is grossly unremarkable  NASCET criteria was used to determine the degree of internal carotid artery diameter stenosis  Impression: No hemodynamically significant stenosis of cervical carotid and vertebral arteries  Workstation performed: [id number]     EKG, Pathology, and Other Studies: I have personally reviewed pertinent reports        VTE Pharmacologic Prophylaxis: Reason for no pharmacologic prophylaxis SDD present    VTE Mechanical Prophylaxis: sequential compression device

## 2022-06-01 ENCOUNTER — APPOINTMENT (INPATIENT)
Dept: NON INVASIVE DIAGNOSTICS | Facility: HOSPITAL | Age: 82
DRG: 025 | End: 2022-06-01
Payer: MEDICARE

## 2022-06-01 LAB
4HR DELTA HS TROPONIN: 3 NG/L
ANION GAP SERPL CALCULATED.3IONS-SCNC: 7 MMOL/L (ref 4–13)
AORTIC ROOT: 3.4 CM
APICAL FOUR CHAMBER EJECTION FRACTION: 52 %
BASOPHILS # BLD AUTO: 0.02 THOUSANDS/ΜL (ref 0–0.1)
BASOPHILS NFR BLD AUTO: 0 % (ref 0–1)
BUN SERPL-MCNC: 15 MG/DL (ref 5–25)
CA-I BLD-SCNC: 1.17 MMOL/L (ref 1.12–1.32)
CALCIUM SERPL-MCNC: 9.4 MG/DL (ref 8.3–10.1)
CARDIAC TROPONIN I PNL SERPL HS: 5 NG/L
CARDIAC TROPONIN I PNL SERPL HS: 8 NG/L
CHLORIDE SERPL-SCNC: 103 MMOL/L (ref 100–108)
CO2 SERPL-SCNC: 25 MMOL/L (ref 21–32)
CREAT SERPL-MCNC: 0.61 MG/DL (ref 0.6–1.3)
E WAVE DECELERATION TIME: 142 MS
EOSINOPHIL # BLD AUTO: 0.16 THOUSAND/ΜL (ref 0–0.61)
EOSINOPHIL NFR BLD AUTO: 3 % (ref 0–6)
ERYTHROCYTE [DISTWIDTH] IN BLOOD BY AUTOMATED COUNT: 12.7 % (ref 11.6–15.1)
FRACTIONAL SHORTENING: 27 (ref 28–44)
GFR SERPL CREATININE-BSD FRML MDRD: 92 ML/MIN/1.73SQ M
GLUCOSE SERPL-MCNC: 123 MG/DL (ref 65–140)
HCT VFR BLD AUTO: 40 % (ref 36.5–49.3)
HGB BLD-MCNC: 13.9 G/DL (ref 12–17)
IMM GRANULOCYTES # BLD AUTO: 0.03 THOUSAND/UL (ref 0–0.2)
IMM GRANULOCYTES NFR BLD AUTO: 1 % (ref 0–2)
INTERVENTRICULAR SEPTUM IN DIASTOLE (PARASTERNAL SHORT AXIS VIEW): 1 CM
INTERVENTRICULAR SEPTUM: 1 CM (ref 0.6–1.1)
LAAS-AP2: 15.7 CM2
LAAS-AP4: 14.1 CM2
LEFT ATRIUM SIZE: 3.9 CM
LEFT INTERNAL DIMENSION IN SYSTOLE: 3.3 CM (ref 2.1–4)
LEFT VENTRICULAR INTERNAL DIMENSION IN DIASTOLE: 4.5 CM (ref 3.5–6)
LEFT VENTRICULAR POSTERIOR WALL IN END DIASTOLE: 0.8 CM
LEFT VENTRICULAR STROKE VOLUME: 47 ML
LVSV (TEICH): 47 ML
LYMPHOCYTES # BLD AUTO: 1.02 THOUSANDS/ΜL (ref 0.6–4.47)
LYMPHOCYTES NFR BLD AUTO: 16 % (ref 14–44)
MCH RBC QN AUTO: 32.5 PG (ref 26.8–34.3)
MCHC RBC AUTO-ENTMCNC: 34.8 G/DL (ref 31.4–37.4)
MCV RBC AUTO: 94 FL (ref 82–98)
MONOCYTES # BLD AUTO: 0.55 THOUSAND/ΜL (ref 0.17–1.22)
MONOCYTES NFR BLD AUTO: 9 % (ref 4–12)
MV E'TISSUE VEL-SEP: 7 CM/S
MV PEAK A VEL: 0.71 M/S
MV PEAK E VEL: 44 CM/S
MV STENOSIS PRESSURE HALF TIME: 41 MS
MV VALVE AREA P 1/2 METHOD: 5.37
NEUTROPHILS # BLD AUTO: 4.45 THOUSANDS/ΜL (ref 1.85–7.62)
NEUTS SEG NFR BLD AUTO: 71 % (ref 43–75)
NRBC BLD AUTO-RTO: 0 /100 WBCS
PLATELET # BLD AUTO: 401 THOUSANDS/UL (ref 149–390)
PMV BLD AUTO: 9.2 FL (ref 8.9–12.7)
POTASSIUM SERPL-SCNC: 4.5 MMOL/L (ref 3.5–5.3)
RBC # BLD AUTO: 4.28 MILLION/UL (ref 3.88–5.62)
RIGHT ATRIUM AREA SYSTOLE A4C: 19.1 CM2
RIGHT VENTRICLE ID DIMENSION: 4.1 CM
SL CV LEFT ATRIUM LENGTH A2C: 4.9 CM
SL CV LV EF: 55
SL CV PED ECHO LEFT VENTRICLE DIASTOLIC VOLUME (MOD BIPLANE) 2D: 93 ML
SL CV PED ECHO LEFT VENTRICLE SYSTOLIC VOLUME (MOD BIPLANE) 2D: 46 ML
SODIUM SERPL-SCNC: 135 MMOL/L (ref 136–145)
WBC # BLD AUTO: 6.23 THOUSAND/UL (ref 4.31–10.16)

## 2022-06-01 PROCEDURE — 80048 BASIC METABOLIC PNL TOTAL CA: CPT

## 2022-06-01 PROCEDURE — 82330 ASSAY OF CALCIUM: CPT

## 2022-06-01 PROCEDURE — 99024 POSTOP FOLLOW-UP VISIT: CPT | Performed by: NEUROLOGICAL SURGERY

## 2022-06-01 PROCEDURE — 97116 GAIT TRAINING THERAPY: CPT

## 2022-06-01 PROCEDURE — 93306 TTE W/DOPPLER COMPLETE: CPT

## 2022-06-01 PROCEDURE — 85025 COMPLETE CBC W/AUTO DIFF WBC: CPT

## 2022-06-01 PROCEDURE — 92610 EVALUATE SWALLOWING FUNCTION: CPT

## 2022-06-01 PROCEDURE — 97110 THERAPEUTIC EXERCISES: CPT

## 2022-06-01 PROCEDURE — 99223 1ST HOSP IP/OBS HIGH 75: CPT | Performed by: STUDENT IN AN ORGANIZED HEALTH CARE EDUCATION/TRAINING PROGRAM

## 2022-06-01 PROCEDURE — 97535 SELF CARE MNGMENT TRAINING: CPT

## 2022-06-01 PROCEDURE — 84484 ASSAY OF TROPONIN QUANT: CPT

## 2022-06-01 PROCEDURE — 99233 SBSQ HOSP IP/OBS HIGH 50: CPT | Performed by: SURGERY

## 2022-06-01 RX ORDER — SODIUM CHLORIDE, SODIUM GLUCONATE, SODIUM ACETATE, POTASSIUM CHLORIDE, MAGNESIUM CHLORIDE, SODIUM PHOSPHATE, DIBASIC, AND POTASSIUM PHOSPHATE .53; .5; .37; .037; .03; .012; .00082 G/100ML; G/100ML; G/100ML; G/100ML; G/100ML; G/100ML; G/100ML
500 INJECTION, SOLUTION INTRAVENOUS ONCE
Status: COMPLETED | OUTPATIENT
Start: 2022-06-01 | End: 2022-06-01

## 2022-06-01 RX ORDER — BISACODYL 10 MG
10 SUPPOSITORY, RECTAL RECTAL DAILY PRN
Status: DISCONTINUED | OUTPATIENT
Start: 2022-06-01 | End: 2022-06-02

## 2022-06-01 RX ORDER — HEPARIN SODIUM 5000 [USP'U]/ML
5000 INJECTION, SOLUTION INTRAVENOUS; SUBCUTANEOUS EVERY 8 HOURS SCHEDULED
Status: DISCONTINUED | OUTPATIENT
Start: 2022-06-01 | End: 2022-06-01

## 2022-06-01 RX ADMIN — POLYETHYLENE GLYCOL 3350 17 G: 17 POWDER, FOR SOLUTION ORAL at 09:44

## 2022-06-01 RX ADMIN — LEVETIRACETAM 500 MG: 500 TABLET, FILM COATED ORAL at 08:06

## 2022-06-01 RX ADMIN — ACETAMINOPHEN 650 MG: 325 TABLET, FILM COATED ORAL at 13:46

## 2022-06-01 RX ADMIN — LEVETIRACETAM 500 MG: 500 TABLET, FILM COATED ORAL at 21:00

## 2022-06-01 RX ADMIN — SENNOSIDES 8.6 MG: 8.6 TABLET, FILM COATED ORAL at 09:44

## 2022-06-01 RX ADMIN — BISACODYL 10 MG: 10 SUPPOSITORY RECTAL at 07:57

## 2022-06-01 RX ADMIN — TAMSULOSIN HYDROCHLORIDE 0.4 MG: 0.4 CAPSULE ORAL at 15:24

## 2022-06-01 RX ADMIN — DOCUSATE SODIUM 100 MG: 100 CAPSULE, LIQUID FILLED ORAL at 08:06

## 2022-06-01 RX ADMIN — OXYCODONE HYDROCHLORIDE 5 MG: 5 TABLET ORAL at 03:10

## 2022-06-01 RX ADMIN — ACETAMINOPHEN 650 MG: 325 TABLET, FILM COATED ORAL at 21:05

## 2022-06-01 RX ADMIN — Medication 6 MG: at 21:00

## 2022-06-01 RX ADMIN — SODIUM CHLORIDE, SODIUM GLUCONATE, SODIUM ACETATE, POTASSIUM CHLORIDE, MAGNESIUM CHLORIDE, SODIUM PHOSPHATE, DIBASIC, AND POTASSIUM PHOSPHATE 500 ML: .53; .5; .37; .037; .03; .012; .00082 INJECTION, SOLUTION INTRAVENOUS at 12:15

## 2022-06-01 RX ADMIN — DOCUSATE SODIUM 100 MG: 100 CAPSULE, LIQUID FILLED ORAL at 17:03

## 2022-06-01 RX ADMIN — PRAVASTATIN SODIUM 40 MG: 40 TABLET ORAL at 15:30

## 2022-06-01 NOTE — PROGRESS NOTES
Daily Progress Note - Critical Care   Sam Frank 79 y o  male MRN: [id number]  Unit/Bed#: ICU 11 Encounter: [id number]    ----------------------------------------------------------------------------------------  HPI/24hr events: No acute events overnight, remains with SD drain in x1, 37cc out in 24hrs  Plan for MMA embo with NSGY/IR possibly tomorrow  ---------------------------------------------------------------------------------------  SUBJECTIVE  No acute complaints, denies HA, dizziness, Lightheadedness    Review of Systems   Constitutional: Negative for chills and fever  Gastrointestinal: Negative for nausea and vomiting  Neurological: Negative for dizziness, light-headedness and headaches  Review of systems was reviewed and negative unless stated above in HPI/24-hour events   ---------------------------------------------------------------------------------------  Assessment and Plan:    Neuro:    Diagnosis: Acute on chronic SDH, s/p left craniotomy 5/28  o Plan: 5/31 CT showing large region of pneumocephalus, increased midline shift  o L  SD drain remains in place, 37cc/24hrs  o Plan for MMA embolization with NSGY/IR possibly tomorrow  o Continue Neurochecks Q2H  o Seizure PPX with Keppra 500mg BID, now day 6 of 7  o HOB 30 degrees  o Regulate sleep/wake  o CAM ICU  o Melatonin 6mg HS   Diagnosis: Analgesia  o Plan: PRN tylenol (0 doses/24hrs)  o PRN oxycodone 2 5 and 5mg, (2 doses/24hrs)    CV:    Diagnosis: No active issues  o Plan: Continue pravastain 40mg  o SBPs high 90's to 110's  o MAPs >65  o Continue cardiac monitoring      Pulm:   Diagnosis: Acute hypoxic respiratory failure  o Plan: Currently on 2L NC  o Wean as able to maintain spo2 >92%  o Pulm toilet, IS  o CXR if unable to wean    GI:    Diagnosis: No active issues  o Plan:  Tolerating regular diet  o Continue bowel regimen, colace BID, Miralax and senna daily  o Adding dulcolax suppository, no BM yet    :    Diagnosis: Urinary retention  o Plan: Brooks cath replaced 5/30  o Continue Flomax  o Trial brooks removal 6/3 AM   Diagnosis: No additional Issues  o Plan: BUN/Cr stable at 15/0 61  o Continue to trend Renal function  o Strict I and O  o 1 3L urine/24hrs      F/E/N:    F- no maintenance fluids   E- replete as needed   N- Regular diet, tolerating      Heme/Onc:    Diagnosis: No active issues  o Plan: Continue to hold DVT ppx per NSGY  o Hgb stable at 13 9 from 13 5, continue to monitor  o Transfuse as needed to maintain hgb >7    Endo:    Diagnosis: No active issues  o Plan:  on last BG  o Maintain off SSI, continue to trend    ID:    Diagnosis: No active isues  o Plan: Tmax overnight 98 6  o WBC 6 2 from 8,2  o Continue to trend WBC, fever curve    MSK/Skin:    Diagnosis: No acute issues  o Plan: Q2H repositioning while in bed  o PT/OT  o OOBTC as able     Patient appropriate for transfer out of the ICU today?: No  Disposition: Transfer to Stepdown Level 1   Code Status: Level 1 - Full Code  ---------------------------------------------------------------------------------------  ICU CORE MEASURES    Prophylaxis   VTE Pharmacologic Prophylaxis: Pharmacologic VTE Prophylaxis contraindicated due to Contraindicated, SDH  VTE Mechanical Prophylaxis: sequential compression device  Stress Ulcer Prophylaxis: Prophylaxis Not Indicated     Invasive Devices Review  Invasive Devices  Report    Peripheral Intravenous Line  Duration           Peripheral IV 06/01/22 Distal;Dorsal (posterior); Right Forearm <1 day          Drain  Duration           Ventriculostomy/Subdural Subdural drainage catheter Left Parietal region 3 days    Urethral Catheter 16 Fr  1 day              Can any invasive devices be discontinued today?  No  ---------------------------------------------------------------------------------------  OBJECTIVE    Vitals   Vitals:    06/01/22 0500 06/01/22 0502 06/01/22 0600 06/01/22 0602   BP:  93/64  101/65   Pulse: 74 74    Resp: (!) 8  21    Temp:       TempSrc:       SpO2: 98%  98%    Weight:       Height:         Temp (24hrs), Av 5 °F (36 9 °C), Min:98 3 °F (36 8 °C), Max:98 6 °F (37 °C)  Current: Temperature: 98 6 °F (37 °C)    Respiratory:  SpO2: SpO2: 98 %, SpO2 Activity: SpO2 Activity: At Rest  Nasal Cannula O2 Flow Rate (L/min): 2 L/min    Invasive/non-invasive ventilation settings   Respiratory  Report   Lab Data (Last 4 hours)    None         O2/Vent Data (Last 4 hours)    None                Physical Exam  Vitals reviewed  Constitutional:       General: He is not in acute distress  Comments: LUE LLE /, RUE  RLE 3/5   HENT:      Head: Normocephalic  Right Ear: External ear normal       Left Ear: External ear normal       Nose: Nose normal       Mouth/Throat:      Mouth: Mucous membranes are dry  Pharynx: Oropharynx is clear  Eyes:      Pupils: Pupils are equal, round, and reactive to light  Cardiovascular:      Rate and Rhythm: Normal rate and regular rhythm  Pulses: Normal pulses  Heart sounds: Normal heart sounds  No murmur heard  Pulmonary:      Effort: Pulmonary effort is normal  No respiratory distress  Breath sounds: Normal breath sounds  Abdominal:      General: Abdomen is flat  Bowel sounds are normal  There is no distension  Palpations: Abdomen is soft  Tenderness: There is no abdominal tenderness  Musculoskeletal:      Right lower leg: No edema  Left lower leg: No edema  Skin:     General: Skin is warm and dry  Capillary Refill: Capillary refill takes less than 2 seconds  Neurological:      Mental Status: He is alert  GCS: GCS eye subscore is 4  GCS verbal subscore is 5  GCS motor subscore is 6               Laboratory and Diagnostics:  Results from last 7 days   Lab Units 22  0535 22  0615 22  0512 22  0506 22  0512 22  1600   WBC Thousand/uL 6 23 8 22 7 69 9 14 4 94 5 73   HEMOGLOBIN g/dL 13 9 13 5 13 1 12 0 13 1 14 2   HEMATOCRIT % 40 0 40 4 39 2 35 5* 39 7 42 6   PLATELETS Thousands/uL 401* 392* 416* 375 330 380   NEUTROS PCT % 71  --  74  --  66 66   MONOS PCT % 9  --  10  --  11 11     Results from last 7 days   Lab Units 22  0615 22  0512 22  1437 22  0506 22  0512 22  1600   SODIUM mmol/L 135* 137 139 135* 138 139   POTASSIUM mmol/L 4 2 4 0 3 9 4 2 4 0 4 2   CHLORIDE mmol/L 105 107 105 106 109* 107   CO2 mmol/L 27 24 28 23 26 25   ANION GAP mmol/L 3* 6 6 6 3* 7   BUN mg/dL 13 13 15 13 14 15   CREATININE mg/dL 0 55* 0 65 0 79 0 61 0 62 0 78   CALCIUM mg/dL 9 1 9 2 9 4 8 8 8 9 9 0   GLUCOSE RANDOM mg/dL 122 105 104 105 92 94   ALT U/L  --   --   --   --  29 30   AST U/L  --   --   --   --  22 18   ALK PHOS U/L  --   --   --   --  77 79   ALBUMIN g/dL  --   --   --   --  3 2* 3 6   TOTAL BILIRUBIN mg/dL  --   --   --   --  0 58 0 52     Results from last 7 days   Lab Units 22  0512 22  0506 22  0512   MAGNESIUM mg/dL 2 2 2 2 2 4   PHOSPHORUS mg/dL 2 9  --  3 4      Results from last 7 days   Lab Units 22  0506 22  0512 22  1600   INR  1 10 1 04 1 06   PTT seconds 28  --  30              ABG:    VBG:          Micro        EKG: NSR rate 67  Imagin/31 CTH- Left subdural collection that is mostly due to pneumocephalus and fluid is overall stable in size following removal of one of 2 subdural drains but small amount of residual subdural blood products mildly increased     Stable mass effect with 1 cm of   left-to-right shift     I have personally reviewed pertinent reports  Intake and Output  I/O        07 07 07 0700    P  O  600 1460    IV Piggyback 100     Total Intake(mL/kg) 700 (8 9) 1460 (18 5)    Urine (mL/kg/hr) 1545 (0 8) 1288 (0 7)    Drains 120 37    Total Output 1665 1325    Net -965 +135              UOP: 50 ml/hr     Height and Weights   Height: 5' 11" (180 3 cm)  IBW (Ideal Body Weight): 75 3 kg  Body mass index is 24 23 kg/m²  Weight (last 2 days)     None            Nutrition       Diet Orders   (From admission, onward)             Start     Ordered    05/31/22 1124  Dietary nutrition supplements  Once        Question Answer Comment   Select Supplement: Ensure Enlive-Chocolate    Frequency Breakfast, Lunch, Dinner        05/31/22 1123    05/28/22 1145  Diet Regular; Regular House  Diet effective now        References:    Nutrtion Support Algorithm Enteral vs  Parenteral   Question Answer Comment   Diet Type Regular    Regular Regular House    RD to adjust diet per protocol?  No        05/28/22 1144              Active Medications  Scheduled Meds:  Current Facility-Administered Medications   Medication Dose Route Frequency Provider Last Rate    acetaminophen  650 mg Oral Q6H PRN RONY Wyman      docusate sodium  100 mg Oral BID RONY Wyman      levETIRAcetam  500 mg Oral Q12H [facility] RONY Wyman      melatonin  6 mg Oral HS Min Drew Post MD      ondansetron  4 mg Intravenous Q6H PRN RONY Wyman      oxyCODONE  5 mg Oral Q4H PRN RONY Wyman      Or    oxyCODONE  2 5 mg Oral Q4H PRN RONY Wyman      polyethylene glycol  17 g Oral Daily RONY Wyman      pravastatin  40 mg Oral Daily With RONY Hernandez      senna  1 tablet Oral Daily RONY Wyman      tamsulosin  0 4 mg Oral Daily With RONY Hernandez       Continuous Infusions:     PRN Meds:   acetaminophen, 650 mg, Q6H PRN  ondansetron, 4 mg, Q6H PRN  oxyCODONE, 5 mg, Q4H PRN   Or  oxyCODONE, 2 5 mg, Q4H PRN        Allergies   No Known Allergies  ---------------------------------------------------------------------------------------  Advance Directive and Living Will:      Power of :    POLST: ---------------------------------------------------------------------------------------  Care Time Delivered:   No Critical Care time spent     [facility]      Portions of the record may have been created with voice recognition software  Occasional wrong word or "sound a like" substitutions may have occurred due to the inherent limitations of voice recognition software    Read the chart carefully and recognize, using context, where substitutions have occurred

## 2022-06-01 NOTE — ASSESSMENT & PLAN NOTE
POD4 left craniotomy for SDH evacuation  · Patient presented to the ED due to findings on CT scan of an acute on chronic SDH  · Very hard of hearing  · On exam, generalized weakness but no focal weakness appreciated  GCS 15 with slowed mentation  Imaging:  · CT head w/o, 5/31/22: Left subdural collection that is mostly due to pneumocephalus and fluid is overall stable in size following removal of one of 2 subdural drains but small amount of residual subdural blood products mildly increased    Stable mass effect with 1 cm of left-to-right shift    Plan:  · Continue frequent neurological checks  · MRA reviewed and shows occlusion of MMA  However, after review with neuroendovascular attending, this may be secondary to pneumocephalus  · 1 SDD to -10mmHg, 37ss drainage p24h  · Drain removed today without difficulty  Drain site secured with suture  · STAT CT head with any neurological decline including drop GCS of 2pts within 1 hr   · Repeat CT head Thursday AM   · SBP <160mmHg  · Keppra 500mg x7 days post op  · Hold all antiplatelet and anticoagulation medications  · Patient does not take any home AC/AP at baseline  · Medical management and pain control per primary team  · PT/OT, ok to mobilize as tolerated  · DVT PPX: SCDs, ok for [facility] today    Neurosurgery will continue to follow  Plan for possible MMA embolization tomorrow depending on results of CT head  NPO after MN, updated labs ordered  Call with questions/concerns  05-Oct-2021 03:26

## 2022-06-01 NOTE — SPEECH THERAPY NOTE
Speech-Language Pathology Bedside Swallow Evaluation    Patient Name: Nelsy Higgins    [facility]VM[facility]'I Date: 6/1/2022     Problem List  Principal Problem:    SDH (subdural hematoma) [facility])    Past Medical History  Past Medical History:   Diagnosis Date    Arthritis     Encounter for general adult medical examination without abnormal findings 3/20/2019    Hyperlipidemia      Past Surgical History  Past Surgical History:   Procedure Laterality Date    BRAIN HEMATOMA EVACUATION Left 5/28/2022    Procedure: left CRANIOTOMY FOR SUBDURAL HEMATOMA;  Surgeon: Tahira Javier MD;  Location:  MAIN OR;  Service: Neurosurgery    HERNIA REPAIR Right     inguinal    CA REVISE MEDIAN N/CARPAL TUNNEL SURG Right 9/20/2021    Procedure: RELEASE CARPAL TUNNEL;  Surgeon: Kel Hansen MD;  Location: [facility] MAIN OR;  Service: Orthopedics     Summary  Pt presented with functional appearing oral and pharyngeal stage swallowing skills with materials administered today  No s/s aspiration across all trials  Spoke with pt's family re: his speech/communication  They report a gradual decline in his speech with progression of his dementia and hearing loss but no acute changes  No formal speech/language evaluation needed at this time  If pt should end up requiring brain surgery, please re consult with any new changes or concerns  Risk/s for Aspiration: mild    Recommended Diet: regular diet and thin liquids   Recommended Form of Meds: whole with liquid   Aspiration precautions and swallowing strategies: upright posture  Other Recommendations: Continue frequent oral care      Current Medical Status per H&P 5/27/22  Nelsy Higgins is a 79 y o  male who presents following a routine follow-up head CT for history of recent fall with subdural hemorrhage  CT head today showed enlarging acute on chronic subdural hematoma with increasing midline shift    He was subsequently contacted by the neurosurgery team who recommended he attend the emergency department for evaluation for possible evacuation  Currently patient reports no new symptoms  He denies numbness, weakness, headache or dizziness  He is hard of hearing but this is normal for him  He denies any anticoagulant or anti-platelet use  Per progress note 6/1/22  * SDH (subdural hematoma) [facility])  Assessment & Plan  POD4 left craniotomy for SDH evacuation  · Patient presented to the ED due to findings on CT scan of an acute on chronic SDH  · Very hard of hearing  · On exam, generalized weakness but no focal weakness appreciated  GCS 15 with slowed mentation  Imaging:  · CT head w/o, 5/31/22: Left subdural collection that is mostly due to pneumocephalus and fluid is overall stable in size following removal of one of 2 subdural drains but small amount of residual subdural blood products mildly increased    Stable mass effect with 1 cm of left-to-right shift  Plan:  · Continue frequent neurological checks  · MRA reviewed and shows occlusion of MMA  However, after review with neuroendovascular attending, this may be secondary to pneumocephalus  · 1 SDD to -10mmHg, 37ss drainage p24h  ? Drain removed today without difficulty  Drain site secured with suture  · STAT CT head with any neurological decline including drop GCS of 2pts within 1 hr   · Repeat CT head Thursday AM   · SBP <160mmHg  · Keppra 500mg x7 days post op  · Hold all antiplatelet and anticoagulation medications  ? Patient does not take any home AC/AP at baseline  · Medical management and pain control per primary team  · PT/OT, ok to mobilize as tolerated  · DVT PPX: SCDs, ok for [facility] today  Neurosurgery will continue to follow  Plan for possible MMA embolization tomorrow depending on results of CT head  NPO after MN, updated labs ordered  Call with questions/concerns       Special Studies:  CT head 5/31/22 IMPRESSION:  Left subdural collection that is mostly due to pneumocephalus and fluid is overall stable in size following removal of one of 2 subdural drains but small amount of residual subdural blood products mildly increased    Stable mass effect with 1 cm of left-to-right shift  CXR 5/27/22 IMPRESSION:  Mild bibasilar atelectasis  Social/Education/Vocational Hx:  Pt lives with spouse    Swallow Information   Current Risks for Dysphagia & Aspiration: s/p SDH evacuation, AMS  Current Diet: regular diet and thin liquids   Baseline Diet: regular diet and thin liquids      Baseline Assessment   Behavior/Cognition: alert and decreased attention  Speech/Language Status: able to follow commands inconsistently and limited verbal output  Patient Positioning: upright in bed  Pain Status/Interventions/Response to Interventions: No report of or nonverbal indications of pain  Swallow Mechanism Exam  Facial: symmetrical  Labial: WFL  Lingual: WFL  Velum: symmetrical  Mandible: adequate ROM  Dentition: adequate  Vocal quality:weak   Volitional Cough: weak   Respiratory Status: on 2L O2    Consistencies Assessed and Performance   Consistencies Administered: thin liquids, puree and solid (remigio cracker)    Oral Stage: WFL  Mastication was slow but adequate with the materials administered today  Bolus formation and transfer were functional with no significant oral residue noted  No overt s/s reduced oral control  Pharyngeal Stage: WFL  Swallow Mechanics: Swallowing initiation appeared prompt  Laryngeal rise was palpated and judged to be within functional limits  No coughing, throat clearing, change in vocal quality or respiratory status noted today  Esophageal Concerns: none reported      Summary and Recommendations (see above)    Results Reviewed with: patient, RN and family     Treatment Recommended: No additional f/u needed at this time  Please re consult with any new changes or concerns

## 2022-06-01 NOTE — CONSULTS
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    Mixed aphasia   Impaired hearing   Impaired mobility and self care   Impaired cognition     Recommendations:  Rehabilitation Plan:   Continue PT/OT (SL[facility]) while on acute care   The patient may be a candidate for acute inpatient rehabilitation once medically stable  Patient may require further surgical intervention with MMA embolization  Will follow   Covid-19 Testing: [facility] inpatient rehabilitation units require testing within 48 hours of all potential admissions at this time  *Re-testing is NOT required for patients recovering from COVID-19 infection if isolation has been discontinued per CDC criteria  Medical Co-morbidities Plan:  · Acute hypoxic respiratory failure  · Urinary retention with brooks in place  · Hyponatremia   · Hypotension  · Hyperlipidemia   · Arthritis  · Ambulatory dysfunction with falls  · DVT ppx: Thank you for this consultation  Do not hesitate to contact service with further questions  RONY Beltran  PM&R    History of Present Illness:  Sammy Price is a 79 y o  male with a PMH of HLD, arthritis, and recent fall with subdural hemorrhage who presented to the [facility] on 5/27/22 after follow up CT showed enlarging acute on chronic subdural hematoma with increasing midline shift  He was contacted by the neurosurgery team who recommended he be seen in the ER for possible evacuation  He underwent left craniotomy for SDH evacuation and is currently POD #4  MRA was done and showed 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   Plan is for possible MMA embolization on 6/2/22 dependent of results of repeat CT head  PM&R are consulted for rehabilitation recommendations  The patient was seen in the ICU  He is very heard of hearing and has mixed aphasia as well with some difficulty following commands  He shakes his head no to most questions including when asked if he lives with his spouse  Review of Systems: 10 point ROS negative except for what is noted in HPI    Function:  Prior level of function and living situation: [place] in a two story home with 1 HATTIE  He can live on the main level  He was independent  Current level of function:  Physical Therapy: Moderate assist for transfers and ambulation   Occupational Therapy: Minimal assist for grooming, moderate assist for UB bathing/dressing, LB bathing/dressing    Physical Exam:  /65   Pulse (!) 126   Temp 97 9 °F (36 6 °C) (Oral)   Resp 18   Ht 5' 11" (1 803 m)   Wt 78 8 kg (173 lb 11 6 oz)   SpO2 98%   BMI 24 23 kg/m²        Intake/Output Summary (Last 24 hours) at 6/1/2022 0942  Last data filed at 6/1/2022 0800  Gross per 24 hour   Intake 1850 ml   Output 1330 ml   Net 520 ml       Body mass index is 24 23 kg/m²  Physical Exam   Gen: No acute distress, well-nourished, elderly-appearing male, pleasant  HEENT:  Normocephalic/Atraumatic, moist mucus membranes  Extremities: No edema  Pulmonary: Non-labored breathing  GI: Soft, non-tender, non-distended  MSK: ROM appears WFL in all extremities, no appreciated spasticity/tone  Integumentary: Skin is warm, dry  No rashes or ulcers  Neuro: Expressive/receptive aphasia, impaired hearing, some difficulty following commands  Psych: Impaired cognition         Social History:    Social History     Socioeconomic History    Marital status: /Civil Union     Spouse name: None    Number of children: None    Years of education: None    Highest education level: None   Occupational History    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[employer]   Tobacco Use    Smoking status: Former Smoker    Smokeless tobacco: Never Used    Tobacco comment: Smoked as a teenager   Vaping Use    Vaping Use: Never used   Substance and Sexual Activity    Alcohol use: Yes     Comment: Rare    Drug use: No    Sexual activity: None   Other Topics Concern    None   Social History Narrative    None     Social Determinants of Health     Financial Resource Strain: Not on file   Food Insecurity: Not on file   Transportation Needs: Not on file   Physical Activity: Not on file   Stress: Not on file   Social Connections: Not on file   Intimate Partner Violence: Not on file   Housing Stability: Not on file        Family History:    History reviewed  No pertinent family history        Medications:     Current Facility-Administered Medications:     acetaminophen (TYLENOL) tablet 650 mg, 650 mg, Oral, Q6H PRN, RONY Bedolla, 650 mg at 05/30/22 2870    bisacodyl (DULCOLAX) rectal suppository 10 mg, 10 mg, Rectal, Daily PRN, RONY Hernandez, 10 mg at 06/01/22 0757    docusate sodium (COLACE) capsule 100 mg, 100 mg, Oral, BID, RONY Bedolla, 100 mg at 06/01/22 0806    levETIRAcetam (KEPPRA) tablet 500 mg, 500 mg, Oral, Q12H JEANNIE, RONY Leary, 500 mg at 06/01/22 0806    melatonin tablet 6 mg, 6 mg, Oral, HS, Marques Sood MD, 6 mg at 05/31/22 2129    ondansetron (ZOFRAN) injection 4 mg, 4 mg, Intravenous, Q6H PRN, RONY Bedolla    oxyCODONE (ROXICODONE) IR tablet 5 mg, 5 mg, Oral, Q4H PRN, 5 mg at 06/01/22 0310 **OR** oxyCODONE (ROXICODONE) IR tablet 2 5 mg, 2 5 mg, Oral, Q4H PRN, RONY Bedolla    polyethylene glycol (MIRALAX) packet 17 g, 17 g, Oral, Daily, RONY Bedolla, 17 g at 05/31/22 0804    pravastatin (PRAVACHOL) tablet 40 mg, 40 mg, Oral, Daily With RONY Maya, 40 mg at 05/31/22 1743    senna (SENOKOT) tablet 8 6 mg, 1 tablet, Oral, Daily, RONY Bedolla, 8 6 mg at 05/31/22 0804    tamsulosin (FLOMAX) capsule 0 4 mg, 0 4 mg, Oral, Daily With RONY Patterson, 0 4 mg at 05/31/22 1744    Past Medical History:     Past Medical History:   Diagnosis Date    Arthritis     Encounter for general adult medical examination without abnormal findings 3/20/2019    Hyperlipidemia         Past Surgical History:     Past Surgical History:   Procedure Laterality Date    BRAIN HEMATOMA EVACUATION Left 5/28/2022    Procedure: left CRANIOTOMY FOR SUBDURAL HEMATOMA;  Surgeon: Ivet Hart MD;  Location:  MAIN OR;  Service: Neurosurgery    HERNIA REPAIR Right     inguinal    KS REVISE MEDIAN N/CARPAL TUNNEL SURG Right 9/20/2021    Procedure: RELEASE CARPAL TUNNEL;  Surgeon: Bernie Hein MD;  Location: [facility];  Service: Orthopedics         Allergies:     No Known Allergies        LABORATORY RESULTS:      Lab Results   Component Value Date    HGB 13 9 06/01/2022    HGB 15 3 05/11/2018    HCT 40 0 06/01/2022    HCT 46 2 05/11/2018    WBC 6 23 06/01/2022    WBC 5 1 05/11/2018     Lab Results   Component Value Date    BUN 15 06/01/2022    BUN 17 05/11/2018     05/11/2018    K 4 5 06/01/2022    K 4 7 05/11/2018     06/01/2022     05/11/2018    CREATININE 0 61 06/01/2022    CREATININE 0 84 05/11/2018     Lab Results   Component Value Date    PROTIME 13 7 05/29/2022    INR 1 10 05/29/2022        DIAGNOSTIC STUDIES: Reviewed  XR chest 1 view portable    Result Date: 5/29/2022  Impression: Mild bibasilar atelectasis  Workstation performed: [id number]     CT head wo contrast    Result Date: 5/31/2022  Impression: Left subdural collection that is mostly due to pneumocephalus and fluid is overall stable in size following removal of one of 2 subdural drains but small amount of residual subdural blood products mildly increased     Stable mass effect with 1 cm of left-to-right shift Workstation performed: [id number]     CT head wo contrast    Result Date: 5/30/2022  Impression: Interval left craniotomy and evacuation of large left hemispheric subdural hematoma with subdural drains in place  Large pneumocephalus primarily within the collection with residual subdural blood  Interval worsening left-to-right midline shift measuring 10 mm (previously 8 mm), primarily due to large pneumocephalus within the collection  I personally discussed this study with Alexandria Bowens on 5/30/2022 at 8:20 AM  Workstation performed: [id number]     CT head wo contrast    Result Date: 5/27/2022  Impression: Increasing acute on chronic subdural hematoma compared to prior studies with  increasing mass effect and midline shift  Workstation performed: [id number]     MRA head wo contrast    Result Date: 5/29/2022  Impression: 1  Patent major vasculature of [place]  No AV malformation or aneurysm  2   Diminished flow related signal in the distal left MCA branches likely secondary to mass effect from subdural collection  3   Interval left craniotomy for subdural hematoma evacuation with pneumocephalus and residual layering subdural fluid/blood  Subdural drain is in place  Persistent mass effect and 9 mm left-to-right midline shift  Workstation performed: [id number]     MRA carotids wo contrast    Result Date: 5/29/2022  Impression: No hemodynamically significant stenosis of cervical carotid and vertebral arteries   Workstation performed: [id number]

## 2022-06-01 NOTE — PLAN OF CARE
Problem: PHYSICAL THERAPY ADULT  Goal: Performs mobility at highest level of function for planned discharge setting  See evaluation for individualized goals  Description: Treatment/Interventions: Functional transfer training, LE strengthening/ROM, Elevations, Therapeutic exercise, Endurance training, Patient/family training, Equipment eval/education, Bed mobility, Gait training, Spoke to nursing, OT  Equipment Recommended: Chantell Nelson       See flowsheet documentation for full assessment, interventions and recommendations  Outcome: Progressing  Note: Prognosis: Good  Problem List: Decreased strength, Decreased endurance, Decreased mobility, Impaired balance, Decreased cognition  Assessment: Pt presents to therapy today with reduced mobility, poor endurance, Jena, gait abnormalities, required rest breaks throughout activity  These impairments limit the patient by requiring increased assistance for mobility and places him at risk of falling  Pt would benefit from continued skilled therapy while in the hospital to improve overall mobility and work towards a safe d/c  Recommend rehab  At end of session patient was left seated with call bell within reach  The patient's AM-PAC Basic Mobility Inpatient Short Form Raw Score is 15  A Raw score of less than or equal to 16 suggests the patient may benefit from discharge to post-acute rehabilitation services  Please also refer to the recommendation of the Physical Therapist for safe discharge planning  PT tolerated session well  PT Discharge Recommendation: Post acute rehabilitation services          See flowsheet documentation for full assessment

## 2022-06-01 NOTE — PLAN OF CARE
Problem: OCCUPATIONAL THERAPY ADULT  Goal: Performs self-care activities at highest level of function for planned discharge setting  See evaluation for individualized goals  Description: Treatment Interventions: ADL retraining, Functional transfer training, UE strengthening/ROM, Endurance training, Visual perceptual retraining, Cognitive reorientation, Patient/family training, Equipment evaluation/education, Neuromuscular reeducation, Fine motor coordination activities, Compensatory technique education, Activityengagement          See flowsheet documentation for full assessment, interventions and recommendations  Outcome: Progressing  Note: Limitation: Decreased ADL status, Decreased UE ROM, Decreased endurance, Decreased sensation, Visual deficit, Decreased fine motor control, Decreased self-care trans, Decreased high-level ADLs  Prognosis: Good  Assessment: Patient participated in Skilled OT session 6/1/2022 with interventions consisting of ADL re training with the use of correct body mechnaics, Energy Conservation techniques, deep breathing technique, safety awareness and fall prevention techniques, therapeutic exercise to: increase functional use of BUEs, increase BUE muscle strength ,  therapeutic activities to: increase activity tolerance, increase dynamic sit/ stand balance during functional activity , increase postural control, increase trunk control and increase OOB/ sitting tolerance   Patient agreeable to OT treatment session, upon arrival patient was found supine in bed  In comparison to previous session, patient with improvements in EOB sitting tolerance/balance, endurance, functional mobility and transfers   Patient requiring frequent re direction, verbal cues for safety, verbal cues for correct technique, verbal cues for pacing thru activity steps and frequent rest periods   Patient continues to be functioning below baseline level, occupational performance remains limited secondary to factors listed above and increased risk for falls and injury  From OT standpoint, recommendation at time of d/c would be Short Term Rehab when medically stable  Patient to benefit from continued Occupational Therapy treatment while in the hospital to address deficits as defined above and maximize level of functional independence with ADLs and functional mobility    Recommendation: Physiatry Consult  OT Discharge Recommendation: Post acute rehabilitation services  OT - OK to Discharge: Yes (when medically stable)     Suha Schmidt MS, OTR/L

## 2022-06-01 NOTE — CASE MANAGEMENT
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    Overweight (BMI 25 0-29 9) 2021    Negative depression screening 2019    Medicare annual wellness visit, subsequent 2019    Hypercholesterolemia 2018    Borderline hypertension 2018      LOS (days): 5  Geometric Mean LOS (GMLOS) (days): 6 60  Days to GMLOS:1 7     OBJECTIVE:  Risk of Unplanned Readmission Score: 11 64         Current admission status: Inpatient   Preferred Pharmacy:   [facility] [address] JOCELINE HERRERA [facility] [address], [address] [address]  Phone: [phone] Fax: [phone]    Primary Care Provider: Kelly Calix DO    Primary Insurance: MEDICARE  Secondary Insurance: [address] DETAILS:      Pt seen by OT/PT and recommended for IP rehab  Pt's family requested referral to [facility] yesterday   Pt being followed and CM will follow up

## 2022-06-01 NOTE — PROGRESS NOTES
[address] [facility]  Progress Note - Estefany Morales 1940, 79 y o  male MRN: [id number]  Unit/Bed#: ICU 11 Encounter: [id number]  Primary Care Provider: Caryn Gary DO   Date and time admitted to hospital: 5/27/2022  3:04 PM    * SDH (subdural hematoma) [facility])  Assessment & Plan  POD4 left craniotomy for SDH evacuation  · Patient presented to the ED due to findings on CT scan of an acute on chronic SDH  · Very hard of hearing  · On exam, generalized weakness but no focal weakness appreciated  GCS 15 with slowed mentation  Imaging:  · CT head w/o, 5/31/22: Left subdural collection that is mostly due to pneumocephalus and fluid is overall stable in size following removal of one of 2 subdural drains but small amount of residual subdural blood products mildly increased    Stable mass effect with 1 cm of left-to-right shift    Plan:  · Continue frequent neurological checks  · MRA reviewed and shows occlusion of MMA  However, after review with neuroendovascular attending, this may be secondary to pneumocephalus  · 1 SDD to -10mmHg, 37ss drainage p24h  · Drain removed today without difficulty  Drain site secured with suture  · STAT CT head with any neurological decline including drop GCS of 2pts within 1 hr   · Repeat CT head Thursday AM   · SBP <160mmHg  · Keppra 500mg x7 days post op  · Hold all antiplatelet and anticoagulation medications  · Patient does not take any home AC/AP at baseline  · Medical management and pain control per primary team  · PT/OT, ok to mobilize as tolerated  · DVT PPX: SCDs, ok for [facility] today    Neurosurgery will continue to follow  Plan for possible MMA embolization tomorrow depending on results of CT head  NPO after MN, updated labs ordered  Call with questions/concerns  Subjective/Objective   Chief Complaint: none    Subjective: Patient with NAEO  He has no complaints today other than wanted to get out of bed   Tolerating PO intake well  Plan to work with therapy today  Objective: Patient sitting in bed in [facility], VSS  SDD removed today without difficulty  Invasive Devices  Report    Peripheral Intravenous Line  Duration           Peripheral IV 06/01/22 Distal;Dorsal (posterior); Right Forearm <1 day          Drain  Duration           Ventriculostomy/Subdural Subdural drainage catheter Left Parietal region 3 days    Urethral Catheter 16 Fr  1 day                Vitals: Blood pressure 101/65, pulse 72, temperature 98 6 °F (37 °C), temperature source Oral, resp  rate 19, height 5' 11" (1 803 m), weight 78 8 kg (173 lb 11 6 oz), SpO2 97 %  ,Body mass index is 24 23 kg/m²  General appearance: alert, appears stated age, cooperative and no distress  Head: Normocephalic, left craniotomy incision CDI  SDD removed, drain site secured with suture  No drainage  Eyes: EOMI, PERRL, conjugate gaze  Lungs: non labored breathing  On NC  Heart: regular heart rate  Neurologic:   Mental status: Alert, oriented x3, thought content appropriate  Slowed mentation  GCS 15  Cranial nerves: grossly intact (Cranial nerves II-XII) except significant [place] bilaterally  Sensory: normal to LT x4  Motor: moving all extremities without focal weakness   Coordination: no drift bilaterally    Lab Results: I have personally reviewed pertinent results  Results from last 7 days   Lab Units 06/01/22 0535 05/31/22 0615 05/30/22 0512 05/29/22  0506 05/28/22  0512   WBC Thousand/uL 6 23 8 22 7 69   < > 4 94   HEMOGLOBIN g/dL 13 9 13 5 13 1   < > 13 1   HEMATOCRIT % 40 0 40 4 39 2   < > 39 7   PLATELETS Thousands/uL 401* 392* 416*   < > 330   NEUTROS PCT % 71  --  74  --  66   MONOS PCT % 9  --  10  --  11    < > = values in this interval not displayed       Results from last 7 days   Lab Units 06/01/22  0535 05/31/22  0615 05/30/22 0512 05/29/22  0506 05/28/22  0512 05/27/22  1600   POTASSIUM mmol/L 4 5 4 2 4 0   < > 4 0 4 2   CHLORIDE mmol/L 103 105 107   < > 109* 107   CO2 mmol/L 25 27 24   < > 26 25   BUN mg/dL 15 13 13   < > 14 15   CREATININE mg/dL 0 61 0 55* 0 65   < > 0 62 0 78   CALCIUM mg/dL 9 4 9 1 9 2   < > 8 9 9 0   ALK PHOS U/L  --   --   --   --  77 79   ALT U/L  --   --   --   --  29 30   AST U/L  --   --   --   --  22 18    < > = values in this interval not displayed  Results from last 7 days   Lab Units 05/30/22  0512 05/29/22  0506 05/28/22  0512   MAGNESIUM mg/dL 2 2 2 2 2 4     Results from last 7 days   Lab Units 05/30/22  0512 05/28/22  0512   PHOSPHORUS mg/dL 2 9 3 4     Results from last 7 days   Lab Units 05/29/22  0506 05/28/22  0512 05/27/22  1600   INR  1 10 1 04 1 06   PTT seconds 28 --  30     No results found for: TROPONINT  ABG:No results found for: PHART, EPA4TER, PO2ART, GGW2ENL, Y3FPXOVJ, BEART, SOURCE    Imaging Studies: I have personally reviewed pertinent reports  and I have personally reviewed pertinent films in PACS     XR chest 1 view portable    Result Date: 5/29/2022  Narrative: CHEST INDICATION:   pre-op  COMPARISON:  None EXAM PERFORMED/VIEWS:  XR CHEST PORTABLE FINDINGS: Cardiomediastinal silhouette appears unremarkable  Low lung volumes with mild bibasilar atelectasis  No effusion or pneumothorax  Osseous structures appear within normal limits for patient age  Impression: Mild bibasilar atelectasis  Workstation performed: [id number]     CT head wo contrast    Result Date: 5/31/2022  Narrative: CT BRAIN - WITHOUT CONTRAST INDICATION:   Subdural hematoma Drain removal and repositioning  Eval of pneumocephalus and subdural collection  COMPARISON:  5/30/2022  TECHNIQUE:  CT examination of the brain was performed  In addition to axial images, sagittal and coronal 2D reformatted images were created and submitted for interpretation  Radiation dose length product (DLP) for this visit:  943 75 mGy-cm     This examination, like all CT scans performed in the [facility], was performed utilizing techniques to minimize radiation dose exposure, including the use of iterative  reconstruction and automated exposure control  IMAGE QUALITY:  Diagnostic  FINDINGS: PARENCHYMA: Left parietal craniotomy for evacuation of subdural is again seen  The subdural drain overlying the right frontal convexity has been removed  The posterior subdural drain is unchanged in position    Left subdural collection that is pneumocephalus and fluid is overall stable in size but small amount of residual subdural blood products is mildly increased    Stable mass effect with 1 cm of left-to-right shift  No parenchymal hemorrhage  No acute infarct  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  Stable mild chronic microangiopathy  VENTRICLES:  No hydrocephalus  VISUALIZED ORBITS AND PARANASAL SINUSES:  Orbits are unremarkable  Mild scattered mucosal thickening  CALVARIUM AND EXTRACRANIAL SOFT TISSUES:  Postsurgical changes status post left craniotomy  Impression: Left subdural collection that is mostly due to pneumocephalus and fluid is overall stable in size following removal of one of 2 subdural drains but small amount of residual subdural blood products mildly increased     Stable mass effect with 1 cm of left-to-right shift Workstation performed: [id number]     CT head wo contrast    Result Date: 5/30/2022  Narrative: CT BRAIN - WITHOUT CONTRAST INDICATION:   Subdural hematoma evaluation of right SDH  COMPARISON:  Head CT 5/27/2022 TECHNIQUE:  CT examination of the brain was performed  In addition to axial images, sagittal and coronal 2D reformatted images were created and submitted for interpretation  Radiation dose length product (DLP) for this visit:  915 84 mGy-cm   This examination, like all CT scans performed in the [facility], was performed utilizing techniques to minimize radiation dose exposure, including the use of iterative  reconstruction and automated exposure control  IMAGE QUALITY:  Diagnostic  FINDINGS: PARENCHYMA AND EXTRA-AXIAL SPACES: Interval left sided craniotomy for evacuation of large left hemispheric subdural hematoma  There are 2 (frontal and temporal) subdural drains in place  There is large pneumocephalus primarily within the collection and residual subdural blood  There is  interval worsening of left-to-right midline shift mainly due to pneumocephalus  It measures 10 mm, previously 8 mm  No acute parenchymal hemorrhage  Basal cisterns are patent  VENTRICLES: Normal for the patient's age  VISUALIZED ORBITS AND PARANASAL SINUSES:  Unremarkable  CALVARIUM AND EXTRACRANIAL SOFT TISSUES:  Soft tissue emphysema in the left temple and infratemporal regions  Impression: Interval left craniotomy and evacuation of large left hemispheric subdural hematoma with subdural drains in place  Large pneumocephalus primarily within the collection with residual subdural blood  Interval worsening left-to-right midline shift measuring 10 mm (previously 8 mm), primarily due to large pneumocephalus within the collection  I personally discussed this study with Rosemary Valle on 5/30/2022 at 8:20 AM  Workstation performed: [id number]     CT head wo contrast    Result Date: 5/27/2022  Narrative: CT BRAIN - WITHOUT CONTRAST INDICATION:   D18 1: Lymphangioma, any site  Subdural hygroma  Subdural hematoma  Follow-up  COMPARISON:  5/10/2022 and 4/19/2022 TECHNIQUE:  CT examination of the brain was performed  In addition to axial images, sagittal and coronal 2D reformatted images were created and submitted for interpretation  Radiation dose length product (DLP) for this visit:  848  63 mGy-cm   This examination, like all CT scans performed in the [facility], was performed utilizing techniques to minimize radiation dose exposure, including the use of iterative  reconstruction and automated exposure control  IMAGE QUALITY:  Diagnostic   FINDINGS: PARENCHYMA: Left convexity subdural collection has increased in size and density, 2 0 cm maximal depth (1 3 cm 5/10/2022, measured in a similar fashion today by this reader)  Increased sulcal effacement and 0 8 cm left to right midline shift  No parenchymal hemorrhage Gray-white matter differentiation preserved  Nonspecific, similar patchy periventricular and subcortical white matter lucencies most commonly related to changes of microangiopathy  Vascular calcification  VENTRICLES AND EXTRA-AXIAL SPACES:  Mild effacement of the left lateral ventricle  No obstructive hydrocephalus  VISUALIZED ORBITS AND PARANASAL SINUSES: Mild left maxillary mucosal thickening  Globes and orbits are within normal limits  CALVARIUM AND EXTRACRANIAL SOFT TISSUES:  Within normal limits  Mastoid air cells are well aerated  I personally discussed this study with Jag Finney on 5/27/2022 at 11:03 AM      Impression: Increasing acute on chronic subdural hematoma compared to prior studies with  increasing mass effect and midline shift  Workstation performed: [id number]     CT head wo contrast    Result Date: 5/11/2022  Narrative: CT BRAIN - WITHOUT CONTRAST INDICATION:   D18 1: Lymphangioma, any site  COMPARISON:  Prior CT April 19, 2022 TECHNIQUE:  CT examination of the brain was performed  In addition to axial images, sagittal and coronal 2D reformatted images were created and submitted for interpretation  Radiation dose length product (DLP) for this visit:  836 56 mGy-cm   This examination, like all CT scans performed in the [facility], was performed utilizing techniques to minimize radiation dose exposure, including the use of iterative  reconstruction and automated exposure control  IMAGE QUALITY:  Diagnostic  FINDINGS: PARENCHYMA: Decreased attenuation is noted in periventricular and subcortical white matter demonstrating an appearance that is statistically most likely to represent moderate microangiopathic change   No CT signs of acute infarction  No intracranial mass, mass effect or midline shift  No acute parenchymal hemorrhage  VENTRICLES AND EXTRA-AXIAL SPACES:  Normal for the patient's age  VISUALIZED ORBITS AND PARANASAL SINUSES:  Unremarkable  CALVARIUM AND EXTRACRANIAL SOFT TISSUES:  Normal      Impression: Increase size of left hemispheric low dense extra-axial collection most compatible with chronic subdural hygroma  Chronic subdural hematoma less likely  New 4 mm left to right shift is noted  Resolution of right hemispheric low dense collection  The study was marked in EPIC for significant notification  Workstation performed: [id number]     MRA head wo contrast    Result Date: 5/29/2022  Narrative: MRA BRAIN WITHOUT CONTRAST INDICATION:  SDH COMPARISON:  Head CT 5/27/2022 TECHNIQUE:  Axial 3-D time-of-flight imaging with 3-D reconstructions performed without contrast    IV Contrast:  Not administered  FINDINGS: IMAGE QUALITY:  Diagnostic  ANATOMY INTERNAL CAROTID ARTERIES:  Normal flow related signal of the distal cervical, petrous and cavernous segments of the internal carotid arteries  Normal ICA terminus  ANTERIOR CIRCULATION:  Normal A1 segments  Normal anterior communicating artery  Normal flow-related signal of the anterior cerebral arteries  MIDDLE CEREBRAL ARTERY CIRCULATION:  Patent left M1 and M2 branches which are displaced by mass effect from subdural collection  There is diminished flow related signal in the distal branches likely secondary to mass effect  Unremarkable right M1 segment and major M2 branches  DISTAL VERTEBRAL ARTERIES:  Distal vertebral arteries are patient with a normal vertebrobasilar junction  The posterior inferior cerebellar artery origins are normal  BASILAR ARTERY:  Unremarkable  Proximal basilar artery fenestration  POSTERIOR CEREBRAL ARTERIES:  There is fetal origin of the right posterior cerebral artery  The left posterior cerebral artery arises from the basilar tip    Both demonstrate no focal stenosis  Absent/hypoplastic left posterior communicating artery  ADDITIONAL FINDINGS: Interval left craniotomy for subdural hematoma evacuation with pneumocephalus and residual layering subdural fluid/blood  Subdural drain is in place  Persistent mass effect and 9 mm left-to-right midline shift  Impression: 1  Patent major vasculature of [place]  No AV malformation or aneurysm  2   Diminished flow related signal in the distal left MCA branches likely secondary to mass effect from subdural collection  3   Interval left craniotomy for subdural hematoma evacuation with pneumocephalus and residual layering subdural fluid/blood  Subdural drain is in place  Persistent mass effect and 9 mm left-to-right midline shift  Workstation performed: [id number]     MRA carotids wo contrast    Result Date: 5/29/2022  Narrative: MRA NECK WITHOUT CONTRAST INDICATION: SDH COMPARISON:  None  TECHNIQUE:  2D and 3D Time of Flight angiography with 3D reconstructions  IMAGE QUALITY:  Diagnostic  FINDINGS: AORTIC ARCH:  Normal  VISUALIZED SUBCLAVIAN ARTERIES:  The subclavian arteries demonstrate normal flow-related enhancement with no stenosis  VERTEBRAL ARTERIES:  Normal vertebral artery origins  The vertebral arteries are bilaterally symmetric with normal enhancement and no evidence of stenosis  RIGHT CAROTID ARTERY:  Normal common carotid artery, cervical internal carotid artery and external carotid artery  No stenosis at the bifurcation  LEFT CAROTID ARTERY:  Mild smooth narrowing of the proximal internal carotid artery, less than 50%  VISUALIZED INTRACRANIAL VASCULATURE:  Limited visualization of the intracranial vasculature is grossly unremarkable  NASCET criteria was used to determine the degree of internal carotid artery diameter stenosis  Impression: No hemodynamically significant stenosis of cervical carotid and vertebral arteries   Workstation performed: [id number]     EKG, Pathology, and Other Studies: I have personally reviewed pertinent reports        VTE Pharmacologic Prophylaxis: Reason for no pharmacologic prophylaxis SDD    VTE Mechanical Prophylaxis: sequential compression device

## 2022-06-01 NOTE — OCCUPATIONAL THERAPY NOTE
Occupational Therapy Treatment Note      Elio Pradip    6/1/2022    Principal Problem:    SDH (subdural hematoma) ([address] )      Past Medical History:   Diagnosis Date    Arthritis     Encounter for general adult medical examination without abnormal findings 3/20/2019    Hyperlipidemia        Past Surgical History:   Procedure Laterality Date    BRAIN HEMATOMA EVACUATION Left 5/28/2022    Procedure: left CRANIOTOMY FOR SUBDURAL HEMATOMA;  Surgeon: Matthew Chau MD;  Location:  MAIN OR;  Service: Neurosurgery    HERNIA REPAIR Right     inguinal    SC REVISE MEDIAN N/CARPAL TUNNEL SURG Right 9/20/2021    Procedure: RELEASE CARPAL TUNNEL;  Surgeon: Radha Weiss MD;  Location: [facility];  Service: Orthopedics        06/01/22 0938   OT Last Visit   OT Visit Date 06/01/22   Note Type   Note Type Treatment   Restrictions/Precautions   Weight Bearing Precautions Per Order No   Other Precautions Cognitive; Chair Alarm; Bed Alarm;Multiple lines;Telemetry; Fall Risk;Pain;O2;Hard of hearing  (2L O2; pocket talker)   Lifestyle   Autonomy I adls and mobility w/o ad - i iadls - shares homemaking with spouse   Reciprocal Relationships supportive family - spouse and dtr present   Service to Others runs [address] - per wife pt's trees have been in the [facility] Room of the  Gratification active pta   Pain Assessment   Pain Assessment Tool 0-10   Pain Score No Pain   ADL   Grooming Assistance 4  Minimal Assistance   Grooming Deficit Setup;Verbal cueing;Supervision/safety; Increased time to complete;Wash/dry hands; Wash/dry face; Teeth care   Grooming Comments pt completed grooming while seated EOB w/ setup  Pt able to wash face w/ rag w/ L hand after VC for initiation and hand over hand to grab rag from basin  Pt then needed Max VC to initiate brushing teeth  Pt had to be handed toothbrush and toothepaste   Pt able to apply toothepaste to brush w/o cues and initiate brushing w/ brush once placed in hand  Pt terminated task pre maturely but able to gargle and spit w/ S   UB Bathing Assistance 4  Minimal Assistance   UB Bathing Deficit Setup;Verbal cueing;Supervision/safety; Increased time to complete;Right arm;Left arm   UB Bathing Comments Pt bathed B/L UEs while seated EOB, Min A for thoroughness of task  Max VC for initiation   LB Bathing Assistance 3  Moderate Assistance   LB Bathing Deficit Setup;Right upper leg;Left upper leg   LB Bathing Comments Pt bathed upper halves of B/L LEs  Max VC for initation   Bed Mobility   Supine to Sit 3  Moderate assistance   Additional items Assist x 1;HOB elevated; Increased time required;Verbal cues;LE management   Sit to Supine Unable to assess   Additional Comments Pt sat EOB w/ S for sitting balance/trunk control   Transfers   Sit to Stand 3  Moderate assistance   Additional items Assist x 1; Increased time required;Verbal cues   Stand to Sit 3  Moderate assistance   Additional items Assist x 1; Increased time required;Verbal cues   Additional Comments Pt required Mod A for moderate force production into standing and VC for hand placement, progressed to Min A x1 after 2 trials  RW for support in standing   Functional Mobility   Functional Mobility 4  Minimal assistance   Additional Comments Pt ambulated short household distance w/ Min A x1; RW for support   Additional items Rolling walker   Cognition   Overall Cognitive Status Impaired   Arousal/Participation Responsive; Cooperative   Attention Difficulty attending to directions   Orientation Level Oriented to person   Memory Decreased recall of precautions   Following Commands Follows one step commands with increased time or repetition   Comments Pt is cooperative; very hard of hearing  Consistently shaking his head no for more than 80% of questions  Appears somewhat confused, slow to respond; a poor historian at times   Needs redirection to task and Max VC for initiation of functional activities   Activity Tolerance   Activity Tolerance Patient limited by fatigue  (hard of hearing)   Medical Staff Made Aware PT, RN   Assessment   Assessment Patient participated in Skilled OT session 6/1/2022 with interventions consisting of ADL re training with the use of correct body mechnaics, Energy Conservation techniques, deep breathing technique, safety awareness and fall prevention techniques, therapeutic exercise to: increase functional use of BUEs, increase BUE muscle strength ,  therapeutic activities to: increase activity tolerance, increase dynamic sit/ stand balance during functional activity , increase postural control, increase trunk control and increase OOB/ sitting tolerance   Patient agreeable to OT treatment session, upon arrival patient was found supine in bed  In comparison to previous session, patient with improvements in EOB sitting tolerance/balance, endurance, functional mobility and transfers   Patient requiring frequent re direction, verbal cues for safety, verbal cues for correct technique, verbal cues for pacing thru activity steps and frequent rest periods  Patient continues to be functioning below baseline level, occupational performance remains limited secondary to factors listed above and increased risk for falls and injury  From OT standpoint, recommendation at time of d/c would be Short Term Rehab when medically stable  Patient to benefit from continued Occupational Therapy treatment while in the hospital to address deficits as defined above and maximize level of functional independence with ADLs and functional mobility  Plan   Treatment Interventions ADL retraining;Functional transfer training;UE strengthening/ROM; Endurance training;Cognitive reorientation;Patient/family training;Equipment evaluation/education; Compensatory technique education;Continued evaluation; Energy conservation; Activityengagement   Goal Expiration Date 06/12/22   OT Treatment Day 1   OT Frequency 3-5x/wk   Recommendation Recommendation Physiatry Consult   OT Discharge Recommendation Post acute rehabilitation services   OT - OK to Discharge Yes  (when medically stable)   Additional Comments  The patient's raw score on the AM-PAC Daily Activity inpatient short form is 15, standardized score is 34 69, less than 39 4  Patients at this level are likely to benefit from discharge to post-acute rehabilitation services  Please refer to the recommendation of the Occupational Therapist for safe discharge planning  Additional Comments 2 Pt seen as a co-treatment due to the patient's co-morbidities, clinically unstable presentation, and present impairments which are a regression from the patient's baseline     AM-PAC Daily Activity Inpatient   Lower Body Dressing 2   Bathing 2   Toileting 2   Upper Body Dressing 3   Grooming 3   Eating 3   Daily Activity Raw Score 15   Daily Activity Standardized Score (Calc for Raw Score >=11) 34 69   AM-PAC Applied Cognition Inpatient   Following a Speech/Presentation 2   Understanding Ordinary Conversation 3   Taking Medications 2   Remembering Where Things Are Placed or Put Away 2   Remembering List of 4-5 Errands 2   Taking Care of Complicated Tasks 1   Applied Cognition Raw Score 12   Applied Cognition Standardized Score 28 82       Joshua Garcia MS, OTR/L

## 2022-06-01 NOTE — PHYSICAL THERAPY NOTE
Physical Therapy treatment note     Patient Name: Abena Hayes    [id number] Date: 6/1/2022     Problem List  Principal Problem:    SDH (subdural hematoma) (HCC)       Past Medical History  Past Medical History:   Diagnosis Date    Arthritis     Encounter for general adult medical examination without abnormal findings 3/20/2019    Hyperlipidemia         Past Surgical History  Past Surgical History:   Procedure Laterality Date    BRAIN HEMATOMA EVACUATION Left 5/28/2022    Procedure: left CRANIOTOMY FOR SUBDURAL HEMATOMA;  Surgeon: Britt Skiff, MD;  Location:  MAIN OR;  Service: Neurosurgery    HERNIA REPAIR Right     inguinal    DC REVISE MEDIAN N/CARPAL TUNNEL SURG Right 9/20/2021    Procedure: RELEASE CARPAL TUNNEL;  Surgeon: Morelia Melgar MD;  Location: [facility];  Service: Orthopedics      06/01/22 0939   PT Last Visit   PT Visit Date 06/01/22   Note Type   Note Type Treatment   Pain Assessment   Pain Assessment Tool FLACC   Pain Rating: FLACC (Rest) - Face 0   Pain Rating: FLACC (Rest) - Legs 0   Pain Rating: FLACC (Rest) - Activity 0   Pain Rating: FLACC (Rest) - Cry 0   Pain Rating: FLACC (Rest) - Consolability 0   Score: FLACC (Rest) 0   Pain Rating: FLACC (Activity) - Face 0   Pain Rating: FLACC (Activity) - Legs 0   Pain Rating: FLACC (Activity) - Activity 0   Pain Rating: FLACC (Activity) - Cry 0   Pain Rating: FLACC (Activity) - Consolability 0   Score: FLACC (Activity) 0   Restrictions/Precautions   Weight Bearing Precautions Per Order No   Other Precautions Chair Alarm; Bed Alarm;Cognitive;Multiple lines;Telemetry; Fall Risk;Hard of hearing  (pocket talker, prefers to read lips)   General   Chart Reviewed Yes   Family/Caregiver Present No   Cognition   Overall Cognitive Status Impaired   Arousal/Participation Alert; Cooperative   Attention Attends with cues to redirect   Bed Mobility   Supine to Sit 3  Moderate assistance   Additional items Assist x 1   Additional Comments sitting EOB S level   Transfers   Sit to Stand 3  Moderate assistance   Additional items Assist x 1   Stand to Sit 3  Moderate assistance   Additional items Assist x 1   Ambulation/Elevation   Gait pattern Excessively slow; Foward flexed; Shuffling  (assistance progressing R foot)   Gait Assistance 4  Minimal assist   Additional items Assist x 1   Assistive Device Rolling walker   Distance 25ftx2, 50ftx1   Balance   Static Sitting Fair   Dynamic Sitting Fair -   Static Standing Poor +   Dynamic Standing Poor +   Ambulatory Poor   Endurance Deficit   Endurance Deficit Yes   Activity Tolerance   Activity Tolerance Patient limited by fatigue   Medical Staff Made Aware OT   Nurse Made Aware nurse approved therapy session   Exercises   Balance training  sitting EOB for 8-10 minutes while performing dynamic and static balance with OT   Assessment   Prognosis Good   Problem List Decreased strength;Decreased endurance;Decreased mobility; Impaired balance;Decreased cognition   Assessment Pt presents to therapy today with reduced mobility, poor endurance, [place]', gait abnormalities, required rest breaks throughout activity  These impairments limit the patient by requiring increased assistance for mobility and places him at risk of falling  Pt would benefit from continued skilled therapy while in the hospital to improve overall mobility and work towards a safe d/c  Recommend rehab  At end of session patient was left seated with call bell within reach  The patient's AM-PAC Basic Mobility Inpatient Short Form Raw Score is 15  A Raw score of less than or equal to 16 suggests the patient may benefit from discharge to post-acute rehabilitation services  Please also refer to the recommendation of the Physical Therapist for safe discharge planning  PT tolerated session well     Goals   STG Expiration Date 06/08/22   PT Treatment Day 1   Plan   Treatment/Interventions Functional transfer training;MAIKEL strengthening/ROM; Therapeutic exercise; Endurance training;Gait training;Bed mobility; Equipment eval/education;OT   Progress Progressing toward goals   PT Frequency Other (Comment)  (3-6xwk)   Recommendation   PT Discharge Recommendation Post acute rehabilitation services   Equipment Recommended [address] [facility] Recommended Wheeled walker   Change/add to Initiate Systems?  No   AM-PAC Basic Mobility Inpatient   Turning in Bed Without Bedrails 3   Lying on Back to Sitting on Edge of Flat Bed 2   Moving Bed to Chair 3   Standing Up From Chair 2   Walk in Room 3   Climb 3-5 Stairs 2   Basic Mobility Inpatient Raw Score 15   Basic Mobility Standardized Score 36 97   Highest Level Of Mobility   -[facility] Goal 4: Move to chair/commode   Aurea Scherer, PT, DPT

## 2022-06-01 NOTE — PLAN OF CARE
Problem: Potential for Falls  Goal: Patient will remain free of falls  Description: INTERVENTIONS:  - Educate patient/family on patient safety including physical limitations  - Instruct patient to call for assistance with activity   - Consult OT/PT to assist with strengthening/mobility   - Keep Call bell within reach  - Keep bed low and locked with side rails adjusted as appropriate  - Keep care items and personal belongings within reach  - Initiate and maintain comfort rounds  - Make Fall Risk Sign visible to staff  - Offer Toileting every 2 Hours, in advance of need  - Initiate/Maintain bed alarm  - Apply yellow socks and bracelet for high fall risk patients  - Consider moving patient to room near nurses station  Outcome: Progressing     Problem: MOBILITY - ADULT  Goal: Maintain or return to baseline ADL function  Description: INTERVENTIONS:  -  Assess patient's ability to carry out ADLs; assess patient's baseline for ADL function and identify physical deficits which impact ability to perform ADLs (bathing, care of mouth/teeth, toileting, grooming, dressing, etc )  - Assess/evaluate cause of self-care deficits   - Assess range of motion  - Assess patient's mobility; develop plan if impaired  - Assess patient's need for assistive devices and provide as appropriate  - Encourage maximum independence but intervene and supervise when necessary  - Involve family in performance of ADLs  - Assess for home care needs following discharge   - Consider OT consult to assist with ADL evaluation and planning for discharge  - Provide patient education as appropriate  Outcome: Progressing  Goal: Maintains/Returns to pre admission functional level  Description: INTERVENTIONS:  - Perform BMAT or MOVE assessment daily    - Set and communicate daily mobility goal to care team and patient/family/caregiver  - Collaborate with rehabilitation services on mobility goals if consulted  - Perform Range of Motion 3 times a day    - Reposition patient every 2 hours  - Dangle patient 3 times a day  - Stand patient 3 times a day  - Ambulate patient 3 times a day  - Out of bed to chair 3 times a day   - Out of bed for meals 3 times a day  - Out of bed for toileting  - Record patient progress and toleration of activity level   Outcome: Progressing     Problem: Prexisting or High Potential for Compromised Skin Integrity  Goal: Skin integrity is maintained or improved  Description: INTERVENTIONS:  - Identify patients at risk for skin breakdown  - Assess and monitor skin integrity  - Assess and monitor nutrition and hydration status  - Monitor labs   - Assess for incontinence   - Turn and reposition patient  - Assist with mobility/ambulation  - Relieve pressure over bony prominences  - Avoid friction and shearing  - Provide appropriate hygiene as needed including keeping skin clean and dry  - Evaluate need for skin moisturizer/barrier cream  - Collaborate with interdisciplinary team   - Patient/family teaching  - Consider wound care consult   Outcome: Progressing     Problem: Nutrition/Hydration-ADULT  Goal: Nutrient/Hydration intake appropriate for improving, restoring or maintaining nutritional needs  Description: Monitor and assess patient's nutrition/hydration status for malnutrition  Collaborate with interdisciplinary team and initiate plan and interventions as ordered  Monitor patient's weight and dietary intake as ordered or per policy  Utilize nutrition screening tool and intervene as necessary  Determine patient's food preferences and provide high-protein, high-caloric foods as appropriate       INTERVENTIONS:  - Monitor oral intake, urinary output, labs, and treatment plans  - Assess nutrition and hydration status and recommend course of action  - Evaluate amount of meals eaten  - Assist patient with eating if necessary   - Allow adequate time for meals  - Recommend/ encourage appropriate diets, oral nutritional supplements, and vitamin/mineral supplements  - Order, calculate, and assess calorie counts as needed  - Recommend, monitor, and adjust tube feedings and TPN/PPN based on assessed needs  - Assess need for intravenous fluids  - Provide specific nutrition/hydration education as appropriate  - Include patient/family/caregiver in decisions related to nutrition  Outcome: Progressing     Problem: NEUROSENSORY - ADULT  Goal: Achieves stable or improved neurological status  Description: INTERVENTIONS  - Monitor and report changes in neurological status  - Monitor vital signs such as temperature, blood pressure, glucose, and any other labs ordered   - Initiate measures to prevent increased intracranial pressure  - Monitor for seizure activity and implement precautions if appropriate      Outcome: Progressing  Goal: Achieves maximal functionality and self care  Description: INTERVENTIONS  - Monitor swallowing and airway patency with patient fatigue and changes in neurological status  - Encourage and assist patient to increase activity and self care     - Encourage visually impaired, hearing impaired and aphasic patients to use assistive/communication devices  Outcome: Progressing     Problem: CARDIOVASCULAR - ADULT  Goal: Maintains optimal cardiac output and hemodynamic stability  Description: INTERVENTIONS:  - Monitor I/O, vital signs and rhythm  - Monitor for S/S and trends of decreased cardiac output  - Administer and titrate ordered vasoactive medications to optimize hemodynamic stability  - Assess quality of pulses, skin color and temperature  - Assess for signs of decreased coronary artery perfusion  - Instruct patient to report change in severity of symptoms  Outcome: Progressing  Goal: Absence of cardiac dysrhythmias or at baseline rhythm  Description: INTERVENTIONS:  - Continuous cardiac monitoring, vital signs, obtain 12 lead EKG if ordered  - Administer antiarrhythmic and heart rate control medications as ordered  - Monitor electrolytes and administer replacement therapy as ordered  Outcome: Progressing     Problem: RESPIRATORY - ADULT  Goal: Achieves optimal ventilation and oxygenation  Description: INTERVENTIONS:  - Assess for changes in respiratory status  - Assess for changes in mentation and behavior  - Position to facilitate oxygenation and minimize respiratory effort  - Oxygen administered by appropriate delivery if ordered  - Initiate smoking cessation education as indicated  - Encourage broncho-pulmonary hygiene including cough, deep breathe, Incentive Spirometry  - Assess the need for suctioning and aspirate as needed  - Assess and instruct to report SOB or any respiratory difficulty  - Respiratory Therapy support as indicated  Outcome: Progressing     Problem: GASTROINTESTINAL - ADULT  Goal: Maintains or returns to baseline bowel function  Description: INTERVENTIONS:  - Assess bowel function  - Encourage oral fluids to ensure adequate hydration  - Administer IV fluids if ordered to ensure adequate hydration  - Administer ordered medications as needed  - Encourage mobilization and activity  - Consider nutritional services referral to assist patient with adequate nutrition and appropriate food choices  Outcome: Progressing  Goal: Maintains adequate nutritional intake  Description: INTERVENTIONS:  - Monitor percentage of each meal consumed  - Identify factors contributing to decreased intake, treat as appropriate  - Assist with meals as needed  - Monitor I&O, weight, and lab values if indicated  - Obtain nutrition services referral as needed  Outcome: Progressing     Problem: GENITOURINARY - ADULT  Goal: Maintains or returns to baseline urinary function  Description: INTERVENTIONS:  - Assess urinary function  - Encourage oral fluids to ensure adequate hydration if ordered  - Administer IV fluids as ordered to ensure adequate hydration  - Administer ordered medications as needed  - Offer frequent toileting  - Follow urinary retention protocol if ordered  Outcome: Progressing  Goal: Absence of urinary retention  Description: INTERVENTIONS:  - Assess patients ability to void and empty bladder  - Monitor I/O  - Bladder scan as needed  - Discuss with physician/AP medications to alleviate retention as needed  - Discuss catheterization for long term situations as appropriate  Outcome: Progressing     Problem: METABOLIC, FLUID AND ELECTROLYTES - ADULT  Goal: Electrolytes maintained within normal limits  Description: INTERVENTIONS:  - Monitor labs and assess patient for signs and symptoms of electrolyte imbalances  - Administer electrolyte replacement as ordered  - Monitor response to electrolyte replacements, including repeat lab results as appropriate  - Instruct patient on fluid and nutrition as appropriate  Outcome: Progressing  Goal: Fluid balance maintained  Description: INTERVENTIONS:  - Monitor labs   - Monitor I/O and WT  - Instruct patient on fluid and nutrition as appropriate  - Assess for signs & symptoms of volume excess or deficit  Outcome: Progressing     Problem: SKIN/TISSUE INTEGRITY - ADULT  Goal: Skin Integrity remains intact(Skin Breakdown Prevention)  Description: Assess:  -Perform Alejandro assessment every 12  -Clean and moisturize skin every 4  -Inspect skin when repositioning, toileting, and assisting with ADLS  -Assess extremities for adequate circulation and sensation     Bed Management:  -Have minimal linens on bed & keep smooth, unwrinkled  -Change linens as needed when moist or perspiring  -Avoid sitting or lying in one position for more than 2 hours while in bed  -Keep HOB at 20 degrees     Toileting:  -Offer bedside commode  -Assess for incontinence every 2    Activity:  -Mobilize patient 3 times a day  -Encourage activity and walks on unit  -Encourage or provide ROM exercises   -Turn and reposition patient every 2 Hours  -Use appropriate equipment to lift or move patient in bed  -Instruct/ Assist with weight shifting every 2 hours when out of bed in chair  -Consider limitation of chair time 5 hour intervals    Skin Care:  -Avoid use of baby powder, tape, friction and shearing, hot water or constrictive clothing  -Relieve pressure over bony prominences using allevyns  -Do not massage red bony areas  Outcome: Progressing  Goal: Incision(s), wounds(s) or drain site(s) healing without S/S of infection  Description: INTERVENTIONS  - Assess and document dressing, incision, wound bed, drain sites and surrounding tissue  - Provide patient and family education  - Perform skin care/dressing changes every day  Outcome: Progressing     Problem: HEMATOLOGIC - ADULT  Goal: Maintains hematologic stability  Description: INTERVENTIONS  - Assess for signs and symptoms of bleeding or hemorrhage  - Monitor labs  - Administer supportive blood products/factors as ordered and appropriate  Outcome: Progressing     Problem: MUSCULOSKELETAL - ADULT  Goal: Maintain or return mobility to safest level of function  Description: INTERVENTIONS:  - Assess patient's ability to carry out ADLs; assess patient's baseline for ADL function and identify physical deficits which impact ability to perform ADLs (bathing, care of mouth/teeth, toileting, grooming, dressing, etc )  - Assess/evaluate cause of self-care deficits   - Assess range of motion  - Assess patient's mobility  - Assess patient's need for assistive devices and provide as appropriate  - Encourage maximum independence but intervene and supervise when necessary  - Involve family in performance of ADLs  - Assess for home care needs following discharge   - Consider OT consult to assist with ADL evaluation and planning for discharge  - Provide patient education as appropriate  Outcome: Progressing     Problem: COPING  Goal: Pt/Family able to verbalize concerns and demonstrate effective coping strategies  Description: INTERVENTIONS:  - Assist patient/family to identify coping skills, available support systems and cultural and spiritual values  - Provide emotional support, including active listening and acknowledgement of concerns of patient and caregivers  - Reduce environmental stimuli, as able  - Provide patient education  - Assess for spiritual pain/suffering and initiate spiritual care, including notification of Pastoral Care or [place] based community as needed  - Assess effectiveness of coping strategies  Outcome: Progressing  Goal: Will report anxiety at manageable levels  Description: INTERVENTIONS:  - Administer medication as ordered  - Teach and encourage coping skills  - Provide emotional support  - Assess patient/family for anxiety and ability to cope  Outcome: Progressing     Problem: PAIN - ADULT  Goal: Verbalizes/displays adequate comfort level or baseline comfort level  Description: Interventions:  - Encourage patient to monitor pain and request assistance  - Assess pain using appropriate pain scale  - Administer analgesics based on type and severity of pain and evaluate response  - Implement non-pharmacological measures as appropriate and evaluate response  - Consider cultural and social influences on pain and pain management  - Notify physician/advanced practitioner if interventions unsuccessful or patient reports new pain  Outcome: Progressing     Problem: INFECTION - ADULT  Goal: Absence or prevention of progression during hospitalization  Description: INTERVENTIONS:  - Assess and monitor for signs and symptoms of infection  - Monitor lab/diagnostic results  - Monitor all insertion sites, i e  indwelling lines, tubes, and drains  - Monitor endotracheal if appropriate and nasal secretions for changes in amount and color  - [place] appropriate cooling/warming therapies per order  - Administer medications as ordered  - Instruct and encourage patient and family to use good hand hygiene technique  - Identify and instruct in appropriate isolation precautions for identified infection/condition  Outcome: Progressing     Problem: SAFETY ADULT  Goal: Patient will remain free of falls  Description: INTERVENTIONS:  - Educate patient/family on patient safety including physical limitations  - Instruct patient to call for assistance with activity   - Consult OT/PT to assist with strengthening/mobility   - Keep Call bell within reach  - Keep bed low and locked with side rails adjusted as appropriate  - Keep care items and personal belongings within reach  - Initiate and maintain comfort rounds  - Make Fall Risk Sign visible to staff  - Offer Toileting every 2 Hours, in advance of need  - Initiate/Maintain bed alarm  - Apply yellow socks and bracelet for high fall risk patients  - Consider moving patient to room near nurses station  Outcome: Progressing  Goal: Maintain or return to baseline ADL function  Description: INTERVENTIONS:  -  Assess patient's ability to carry out ADLs; assess patient's baseline for ADL function and identify physical deficits which impact ability to perform ADLs (bathing, care of mouth/teeth, toileting, grooming, dressing, etc )  - Assess/evaluate cause of self-care deficits   - Assess range of motion  - Assess patient's mobility; develop plan if impaired  - Assess patient's need for assistive devices and provide as appropriate  - Encourage maximum independence but intervene and supervise when necessary  - Involve family in performance of ADLs  - Assess for home care needs following discharge   - Consider OT consult to assist with ADL evaluation and planning for discharge  - Provide patient education as appropriate  Outcome: Progressing  Goal: Maintains/Returns to pre admission functional level  Description: INTERVENTIONS:  - Perform BMAT or MOVE assessment daily    - Set and communicate daily mobility goal to care team and patient/family/caregiver  - Collaborate with rehabilitation services on mobility goals if consulted  - Perform Range of Motion 3 times a day    - Reposition patient every 2 hours  - Dangle patient 3 times a day  - Stand patient 3 times a day  - Ambulate patient 3 times a day  - Out of bed to chair 3 times a day   - Out of bed for meals 3 times a day  - Out of bed for toileting  - Record patient progress and toleration of activity level   Outcome: Progressing     Problem: DISCHARGE PLANNING  Goal: Discharge to home or other facility with appropriate resources  Description: INTERVENTIONS:  - Identify barriers to discharge w/patient and caregiver  - Arrange for needed discharge resources and transportation as appropriate  - Identify discharge learning needs (meds, wound care, etc )  - Arrange for interpretive services to assist at discharge as needed  - Refer to Case Management Department for coordinating discharge planning if the patient needs post-hospital services based on physician/advanced practitioner order or complex needs related to functional status, cognitive ability, or social support system  Outcome: Progressing     Problem: Knowledge Deficit  Goal: Patient/family/caregiver demonstrates understanding of disease process, treatment plan, medications, and discharge instructions  Description: Complete learning assessment and assess knowledge base    Interventions:  - Provide teaching at level of understanding  - Provide teaching via preferred learning methods  Outcome: Progressing

## 2022-06-01 NOTE — PLAN OF CARE
Problem: Potential for Falls  Goal: Patient will remain free of falls  Description: INTERVENTIONS:  - Educate patient/family on patient safety including physical limitations  - Instruct patient to call for assistance with activity   - Consult OT/PT to assist with strengthening/mobility   - Keep Call bell within reach  - Keep bed low and locked with side rails adjusted as appropriate  - Keep care items and personal belongings within reach  - Initiate and maintain comfort rounds  - Make Fall Risk Sign visible to staff  - Offer Toileting every 2 Hours, in advance of need  - Initiate/Maintain chair alarm  - Obtain necessary fall risk management equipment: bed/chair alarms  - Apply yellow socks and bracelet for high fall risk patients  - Consider moving patient to room near nurses station  Outcome: Progressing     Problem: MOBILITY - ADULT  Goal: Maintain or return to baseline ADL function  Description: INTERVENTIONS:  -  Assess patient's ability to carry out ADLs; assess patient's baseline for ADL function and identify physical deficits which impact ability to perform ADLs (bathing, care of mouth/teeth, toileting, grooming, dressing, etc )  - Assess/evaluate cause of self-care deficits   - Assess range of motion  - Assess patient's mobility; develop plan if impaired  - Assess patient's need for assistive devices and provide as appropriate  - Encourage maximum independence but intervene and supervise when necessary  - Involve family in performance of ADLs  - Assess for home care needs following discharge   - Consider OT consult to assist with ADL evaluation and planning for discharge  - Provide patient education as appropriate  Outcome: Progressing  Goal: Maintains/Returns to pre admission functional level  Description: INTERVENTIONS:  - Perform BMAT or MOVE assessment daily    - Set and communicate daily mobility goal to care team and patient/family/caregiver     - Collaborate with rehabilitation services on mobility goals if consulted  - Perform Range of Motion 3 times a day  - Reposition patient every 2 hours  - Dangle patient 3 times a day  - Stand patient 3 times a day  - Ambulate patient 3 times a day  - Out of bed to chair 3 times a day   - Out of bed for meals 3 times a day  - Out of bed for toileting  - Record patient progress and toleration of activity level   Outcome: Progressing     Problem: Prexisting or High Potential for Compromised Skin Integrity  Goal: Skin integrity is maintained or improved  Description: INTERVENTIONS:  - Identify patients at risk for skin breakdown  - Assess and monitor skin integrity  - Assess and monitor nutrition and hydration status  - Monitor labs   - Assess for incontinence   - Turn and reposition patient  - Assist with mobility/ambulation  - Relieve pressure over bony prominences  - Avoid friction and shearing  - Provide appropriate hygiene as needed including keeping skin clean and dry  - Evaluate need for skin moisturizer/barrier cream  - Collaborate with interdisciplinary team   - Patient/family teaching  - Consider wound care consult   Outcome: Progressing     Problem: Nutrition/Hydration-ADULT  Goal: Nutrient/Hydration intake appropriate for improving, restoring or maintaining nutritional needs  Description: Monitor and assess patient's nutrition/hydration status for malnutrition  Collaborate with interdisciplinary team and initiate plan and interventions as ordered  Monitor patient's weight and dietary intake as ordered or per policy  Utilize nutrition screening tool and intervene as necessary  Determine patient's food preferences and provide high-protein, high-caloric foods as appropriate       INTERVENTIONS:  - Monitor oral intake, urinary output, labs, and treatment plans  - Assess nutrition and hydration status and recommend course of action  - Evaluate amount of meals eaten  - Assist patient with eating if necessary   - Allow adequate time for meals  - Recommend/ encourage appropriate diets, oral nutritional supplements, and vitamin/mineral supplements  - Order, calculate, and assess calorie counts as needed  - Recommend, monitor, and adjust tube feedings and TPN/PPN based on assessed needs  - Assess need for intravenous fluids  - Provide specific nutrition/hydration education as appropriate  - Include patient/family/caregiver in decisions related to nutrition  Outcome: Progressing     Problem: NEUROSENSORY - ADULT  Goal: Achieves stable or improved neurological status  Description: INTERVENTIONS  - Monitor and report changes in neurological status  - Monitor vital signs such as temperature, blood pressure, glucose, and any other labs ordered   - Initiate measures to prevent increased intracranial pressure  - Monitor for seizure activity and implement precautions if appropriate      Outcome: Progressing  Goal: Achieves maximal functionality and self care  Description: INTERVENTIONS  - Monitor swallowing and airway patency with patient fatigue and changes in neurological status  - Encourage and assist patient to increase activity and self care     - Encourage visually impaired, hearing impaired and aphasic patients to use assistive/communication devices  Outcome: Progressing     Problem: CARDIOVASCULAR - ADULT  Goal: Maintains optimal cardiac output and hemodynamic stability  Description: INTERVENTIONS:  - Monitor I/O, vital signs and rhythm  - Monitor for S/S and trends of decreased cardiac output  - Administer and titrate ordered vasoactive medications to optimize hemodynamic stability  - Assess quality of pulses, skin color and temperature  - Assess for signs of decreased coronary artery perfusion  - Instruct patient to report change in severity of symptoms  Outcome: Progressing  Goal: Absence of cardiac dysrhythmias or at baseline rhythm  Description: INTERVENTIONS:  - Continuous cardiac monitoring, vital signs, obtain 12 lead EKG if ordered  - Administer antiarrhythmic and heart rate control medications as ordered  - Monitor electrolytes and administer replacement therapy as ordered  Outcome: Progressing     Problem: RESPIRATORY - ADULT  Goal: Achieves optimal ventilation and oxygenation  Description: INTERVENTIONS:  - Assess for changes in respiratory status  - Assess for changes in mentation and behavior  - Position to facilitate oxygenation and minimize respiratory effort  - Oxygen administered by appropriate delivery if ordered  - Initiate smoking cessation education as indicated  - Encourage broncho-pulmonary hygiene including cough, deep breathe, Incentive Spirometry  - Assess the need for suctioning and aspirate as needed  - Assess and instruct to report SOB or any respiratory difficulty  - Respiratory Therapy support as indicated  Outcome: Progressing     Problem: GASTROINTESTINAL - ADULT  Goal: Maintains or returns to baseline bowel function  Description: INTERVENTIONS:  - Assess bowel function  - Encourage oral fluids to ensure adequate hydration  - Administer IV fluids if ordered to ensure adequate hydration  - Administer ordered medications as needed  - Encourage mobilization and activity  - Consider nutritional services referral to assist patient with adequate nutrition and appropriate food choices  Outcome: Progressing  Goal: Maintains adequate nutritional intake  Description: INTERVENTIONS:  - Monitor percentage of each meal consumed  - Identify factors contributing to decreased intake, treat as appropriate  - Assist with meals as needed  - Monitor I&O, weight, and lab values if indicated  - Obtain nutrition services referral as needed  Outcome: Progressing     Problem: GENITOURINARY - ADULT  Goal: Maintains or returns to baseline urinary function  Description: INTERVENTIONS:  - Assess urinary function  - Encourage oral fluids to ensure adequate hydration if ordered  - Administer IV fluids as ordered to ensure adequate hydration  - Administer ordered medications as needed  - Offer frequent toileting  - Follow urinary retention protocol if ordered  Outcome: Progressing  Goal: Absence of urinary retention  Description: INTERVENTIONS:  - Assess patients ability to void and empty bladder  - Monitor I/O  - Bladder scan as needed  - Discuss with physician/AP medications to alleviate retention as needed  - Discuss catheterization for long term situations as appropriate  Outcome: Progressing     Problem: METABOLIC, FLUID AND ELECTROLYTES - ADULT  Goal: Electrolytes maintained within normal limits  Description: INTERVENTIONS:  - Monitor labs and assess patient for signs and symptoms of electrolyte imbalances  - Administer electrolyte replacement as ordered  - Monitor response to electrolyte replacements, including repeat lab results as appropriate  - Instruct patient on fluid and nutrition as appropriate  Outcome: Progressing  Goal: Fluid balance maintained  Description: INTERVENTIONS:  - Monitor labs   - Monitor I/O and WT  - Instruct patient on fluid and nutrition as appropriate  - Assess for signs & symptoms of volume excess or deficit  Outcome: Progressing     Problem: SKIN/TISSUE INTEGRITY - ADULT  Goal: Skin Integrity remains intact(Skin Breakdown Prevention)  Description: Assess:  -Perform Alejandro assessment every day  -Clean and moisturize skin every 2 hours  -Inspect skin when repositioning, toileting, and assisting with ADLS  -Assess extremities for adequate circulation and sensation     Bed Management:  -Have minimal linens on bed & keep smooth, unwrinkled  -Change linens as needed when moist or perspiring  -Avoid sitting or lying in one position for more than 2 hours while in bed  -Keep HOB at 20 degrees     Toileting:  -Offer bedside commode  -Assess for incontinence every 2 hours  -Use incontinent care products after each incontinent episode such as creams    Activity:  -Mobilize patient 3 times a day  -Encourage activity and walks on unit  -Encourage or provide ROM exercises -Turn and reposition patient every 2 Hours  -Use appropriate equipment to lift or move patient in bed  -Instruct/ Assist with weight shifting every  when out of bed in chair  -Consider limitation of chair time 2 hour intervals    Skin Care:  -Avoid use of baby powder, tape, friction and shearing, hot water or constrictive clothing  -Relieve pressure over bony prominences using allevyns  -Do not massage red bony areas    Next Steps:  -Teach patient strategies to minimize risks such as ringing for assistance   -Consider consults to  interdisciplinary teams such as PT/OT  Outcome: Progressing  Goal: Incision(s), wounds(s) or drain site(s) healing without S/S of infection  Description: INTERVENTIONS  - Assess and document dressing, incision, wound bed, drain sites and surrounding tissue  - Provide patient and family education  - Perform skin care/dressing changes every day  Outcome: Progressing     Problem: HEMATOLOGIC - ADULT  Goal: Maintains hematologic stability  Description: INTERVENTIONS  - Assess for signs and symptoms of bleeding or hemorrhage  - Monitor labs  - Administer supportive blood products/factors as ordered and appropriate  Outcome: Progressing     Problem: MUSCULOSKELETAL - ADULT  Goal: Maintain or return mobility to safest level of function  Description: INTERVENTIONS:  - Assess patient's ability to carry out ADLs; assess patient's baseline for ADL function and identify physical deficits which impact ability to perform ADLs (bathing, care of mouth/teeth, toileting, grooming, dressing, etc )  - Assess/evaluate cause of self-care deficits   - Assess range of motion  - Assess patient's mobility  - Assess patient's need for assistive devices and provide as appropriate  - Encourage maximum independence but intervene and supervise when necessary  - Involve family in performance of ADLs  - Assess for home care needs following discharge   - Consider OT consult to assist with ADL evaluation and planning for discharge  - Provide patient education as appropriate  Outcome: Progressing     Problem: COPING  Goal: Pt/Family able to verbalize concerns and demonstrate effective coping strategies  Description: INTERVENTIONS:  - Assist patient/family to identify coping skills, available support systems and cultural and spiritual values  - Provide emotional support, including active listening and acknowledgement of concerns of patient and caregivers  - Reduce environmental stimuli, as able  - Provide patient education  - Assess for spiritual pain/suffering and initiate spiritual care, including notification of Pastoral Care or [place] based community as needed  - Assess effectiveness of coping strategies  Outcome: Progressing  Goal: Will report anxiety at manageable levels  Description: INTERVENTIONS:  - Administer medication as ordered  - Teach and encourage coping skills  - Provide emotional support  - Assess patient/family for anxiety and ability to cope  Outcome: Progressing     Problem: PAIN - ADULT  Goal: Verbalizes/displays adequate comfort level or baseline comfort level  Description: Interventions:  - Encourage patient to monitor pain and request assistance  - Assess pain using appropriate pain scale  - Administer analgesics based on type and severity of pain and evaluate response  - Implement non-pharmacological measures as appropriate and evaluate response  - Consider cultural and social influences on pain and pain management  - Notify physician/advanced practitioner if interventions unsuccessful or patient reports new pain  Outcome: Progressing     Problem: INFECTION - ADULT  Goal: Absence or prevention of progression during hospitalization  Description: INTERVENTIONS:  - Assess and monitor for signs and symptoms of infection  - Monitor lab/diagnostic results  - Monitor all insertion sites, i e  indwelling lines, tubes, and drains  - Monitor endotracheal if appropriate and nasal secretions for changes in amount and color  - [place] appropriate cooling/warming therapies per order  - Administer medications as ordered  - Instruct and encourage patient and family to use good hand hygiene technique  - Identify and instruct in appropriate isolation precautions for identified infection/condition  Outcome: Progressing     Problem: SAFETY ADULT  Goal: Patient will remain free of falls  Description: INTERVENTIONS:  - Educate patient/family on patient safety including physical limitations  - Instruct patient to call for assistance with activity   - Consult OT/PT to assist with strengthening/mobility   - Keep Call bell within reach  - Keep bed low and locked with side rails adjusted as appropriate  - Keep care items and personal belongings within reach  - Initiate and maintain comfort rounds  - Make Fall Risk Sign visible to staff  - Offer Toileting every 2 Hours, in advance of need  - Initiate/Maintain bed alarm  - Obtain necessary fall risk management equipment: chair/bed alarm  - Apply yellow socks and bracelet for high fall risk patients  - Consider moving patient to room near nurses station  Outcome: Progressing  Goal: Maintain or return to baseline ADL function  Description: INTERVENTIONS:  -  Assess patient's ability to carry out ADLs; assess patient's baseline for ADL function and identify physical deficits which impact ability to perform ADLs (bathing, care of mouth/teeth, toileting, grooming, dressing, etc )  - Assess/evaluate cause of self-care deficits   - Assess range of motion  - Assess patient's mobility; develop plan if impaired  - Assess patient's need for assistive devices and provide as appropriate  - Encourage maximum independence but intervene and supervise when necessary  - Involve family in performance of ADLs  - Assess for home care needs following discharge   - Consider OT consult to assist with ADL evaluation and planning for discharge  - Provide patient education as appropriate  Outcome: Progressing  Goal: Maintains/Returns to pre admission functional level  Description: INTERVENTIONS:  - Perform BMAT or MOVE assessment daily    - Set and communicate daily mobility goal to care team and patient/family/caregiver  - Collaborate with rehabilitation services on mobility goals if consulted  - Perform Range of Motion 3 times a day  - Reposition patient every 2 hours  - Dangle patient 3 times a day  - Stand patient 3 times a day  - Ambulate patient 3 times a day  - Out of bed to chair 2 times a day   - Out of bed for meals 3 times a day  - Out of bed for toileting  - Record patient progress and toleration of activity level   Outcome: Progressing     Problem: DISCHARGE PLANNING  Goal: Discharge to home or other facility with appropriate resources  Description: INTERVENTIONS:  - Identify barriers to discharge w/patient and caregiver  - Arrange for needed discharge resources and transportation as appropriate  - Identify discharge learning needs (meds, wound care, etc )  - Arrange for interpretive services to assist at discharge as needed  - Refer to Case Management Department for coordinating discharge planning if the patient needs post-hospital services based on physician/advanced practitioner order or complex needs related to functional status, cognitive ability, or social support system  Outcome: Progressing     Problem: Knowledge Deficit  Goal: Patient/family/caregiver demonstrates understanding of disease process, treatment plan, medications, and discharge instructions  Description: Complete learning assessment and assess knowledge base    Interventions:  - Provide teaching at level of understanding  - Provide teaching via preferred learning methods  Outcome: Progressing

## 2022-06-02 ENCOUNTER — APPOINTMENT (INPATIENT)
Dept: RADIOLOGY | Facility: HOSPITAL | Age: 82
DRG: 025 | End: 2022-06-02
Payer: MEDICARE

## 2022-06-02 ENCOUNTER — APPOINTMENT (INPATIENT)
Dept: RADIOLOGY | Facility: HOSPITAL | Age: 82
DRG: 025 | End: 2022-06-02
Attending: RADIOLOGY
Payer: MEDICARE

## 2022-06-02 ENCOUNTER — ANESTHESIA (INPATIENT)
Dept: RADIOLOGY | Facility: HOSPITAL | Age: 82
DRG: 025 | End: 2022-06-02
Payer: MEDICARE

## 2022-06-02 ENCOUNTER — ANESTHESIA EVENT (INPATIENT)
Dept: RADIOLOGY | Facility: HOSPITAL | Age: 82
DRG: 025 | End: 2022-06-02
Payer: MEDICARE

## 2022-06-02 PROBLEM — K59.00 CONSTIPATION: Status: ACTIVE | Noted: 2022-06-02

## 2022-06-02 PROBLEM — R33.9 URINARY RETENTION: Status: ACTIVE | Noted: 2022-06-02

## 2022-06-02 PROBLEM — E78.00 HYPERCHOLESTEROLEMIA: Chronic | Status: ACTIVE | Noted: 2018-07-12

## 2022-06-02 LAB
ANION GAP SERPL CALCULATED.3IONS-SCNC: 5 MMOL/L (ref 4–13)
APTT PPP: 31 SECONDS (ref 23–37)
BASOPHILS # BLD AUTO: 0.03 THOUSANDS/ΜL (ref 0–0.1)
BASOPHILS NFR BLD AUTO: 1 % (ref 0–1)
BUN SERPL-MCNC: 16 MG/DL (ref 5–25)
CALCIUM SERPL-MCNC: 9.3 MG/DL (ref 8.3–10.1)
CHLORIDE SERPL-SCNC: 104 MMOL/L (ref 100–108)
CO2 SERPL-SCNC: 27 MMOL/L (ref 21–32)
CREAT SERPL-MCNC: 0.65 MG/DL (ref 0.6–1.3)
EOSINOPHIL # BLD AUTO: 0.14 THOUSAND/ΜL (ref 0–0.61)
EOSINOPHIL NFR BLD AUTO: 3 % (ref 0–6)
ERYTHROCYTE [DISTWIDTH] IN BLOOD BY AUTOMATED COUNT: 12.5 % (ref 11.6–15.1)
GFR SERPL CREATININE-BSD FRML MDRD: 90 ML/MIN/1.73SQ M
GLUCOSE SERPL-MCNC: 103 MG/DL (ref 65–140)
HCT VFR BLD AUTO: 41.9 % (ref 36.5–49.3)
HGB BLD-MCNC: 14 G/DL (ref 12–17)
IMM GRANULOCYTES # BLD AUTO: 0.01 THOUSAND/UL (ref 0–0.2)
IMM GRANULOCYTES NFR BLD AUTO: 0 % (ref 0–2)
INR PPP: 1.02 (ref 0.84–1.19)
LYMPHOCYTES # BLD AUTO: 1.01 THOUSANDS/ΜL (ref 0.6–4.47)
LYMPHOCYTES NFR BLD AUTO: 22 % (ref 14–44)
MAGNESIUM SERPL-MCNC: 2.4 MG/DL (ref 1.6–2.6)
MCH RBC QN AUTO: 32.3 PG (ref 26.8–34.3)
MCHC RBC AUTO-ENTMCNC: 33.4 G/DL (ref 31.4–37.4)
MCV RBC AUTO: 97 FL (ref 82–98)
MONOCYTES # BLD AUTO: 0.59 THOUSAND/ΜL (ref 0.17–1.22)
MONOCYTES NFR BLD AUTO: 13 % (ref 4–12)
NEUTROPHILS # BLD AUTO: 2.81 THOUSANDS/ΜL (ref 1.85–7.62)
NEUTS SEG NFR BLD AUTO: 61 % (ref 43–75)
NRBC BLD AUTO-RTO: 0 /100 WBCS
PHOSPHATE SERPL-MCNC: 3.5 MG/DL (ref 2.3–4.1)
PLATELET # BLD AUTO: 407 THOUSANDS/UL (ref 149–390)
PMV BLD AUTO: 9.1 FL (ref 8.9–12.7)
POTASSIUM SERPL-SCNC: 4 MMOL/L (ref 3.5–5.3)
PROTHROMBIN TIME: 13 SECONDS (ref 11.6–14.5)
RBC # BLD AUTO: 4.33 MILLION/UL (ref 3.88–5.62)
SODIUM SERPL-SCNC: 136 MMOL/L (ref 136–145)
WBC # BLD AUTO: 4.59 THOUSAND/UL (ref 4.31–10.16)

## 2022-06-02 PROCEDURE — 36224 PLACE CATH CAROTD ART: CPT | Performed by: RADIOLOGY

## 2022-06-02 PROCEDURE — B31B1ZZ FLUOROSCOPY OF LEFT EXTERNAL CAROTID ARTERY USING LOW OSMOLAR CONTRAST: ICD-10-PCS | Performed by: RADIOLOGY

## 2022-06-02 PROCEDURE — 85025 COMPLETE CBC W/AUTO DIFF WBC: CPT

## 2022-06-02 PROCEDURE — 36227 PLACE CATH XTRNL CAROTID: CPT

## 2022-06-02 PROCEDURE — B3171ZZ FLUOROSCOPY OF LEFT INTERNAL CAROTID ARTERY USING LOW OSMOLAR CONTRAST: ICD-10-PCS | Performed by: RADIOLOGY

## 2022-06-02 PROCEDURE — 85610 PROTHROMBIN TIME: CPT | Performed by: PHYSICIAN ASSISTANT

## 2022-06-02 PROCEDURE — C1887 CATHETER, GUIDING: HCPCS

## 2022-06-02 PROCEDURE — NC001 PR NO CHARGE: Performed by: PHYSICIAN ASSISTANT

## 2022-06-02 PROCEDURE — C1769 GUIDE WIRE: HCPCS

## 2022-06-02 PROCEDURE — C1894 INTRO/SHEATH, NON-LASER: HCPCS

## 2022-06-02 PROCEDURE — G1004 CDSM NDSC: HCPCS

## 2022-06-02 PROCEDURE — 70450 CT HEAD/BRAIN W/O DYE: CPT

## 2022-06-02 PROCEDURE — 36223 PLACE CATH CAROTID/INOM ART: CPT | Performed by: RADIOLOGY

## 2022-06-02 PROCEDURE — 73560 X-RAY EXAM OF KNEE 1 OR 2: CPT

## 2022-06-02 PROCEDURE — 85730 THROMBOPLASTIN TIME PARTIAL: CPT | Performed by: PHYSICIAN ASSISTANT

## 2022-06-02 PROCEDURE — 84100 ASSAY OF PHOSPHORUS: CPT

## 2022-06-02 PROCEDURE — 99024 POSTOP FOLLOW-UP VISIT: CPT | Performed by: RADIOLOGY

## 2022-06-02 PROCEDURE — 76937 US GUIDE VASCULAR ACCESS: CPT | Performed by: RADIOLOGY

## 2022-06-02 PROCEDURE — 36227 PLACE CATH XTRNL CAROTID: CPT | Performed by: RADIOLOGY

## 2022-06-02 PROCEDURE — B3131ZZ FLUOROSCOPY OF RIGHT COMMON CAROTID ARTERY USING LOW OSMOLAR CONTRAST: ICD-10-PCS | Performed by: RADIOLOGY

## 2022-06-02 PROCEDURE — 80048 BASIC METABOLIC PNL TOTAL CA: CPT

## 2022-06-02 PROCEDURE — 99291 CRITICAL CARE FIRST HOUR: CPT | Performed by: EMERGENCY MEDICINE

## 2022-06-02 PROCEDURE — 83735 ASSAY OF MAGNESIUM: CPT

## 2022-06-02 PROCEDURE — 36224 PLACE CATH CAROTD ART: CPT

## 2022-06-02 RX ORDER — LIDOCAINE HYDROCHLORIDE 10 MG/ML
INJECTION, SOLUTION EPIDURAL; INFILTRATION; INTRACAUDAL; PERINEURAL AS NEEDED
Status: DISCONTINUED | OUTPATIENT
Start: 2022-06-02 | End: 2022-06-02

## 2022-06-02 RX ORDER — MAGNESIUM CARB/ALUMINUM HYDROX 105-160MG
296 TABLET,CHEWABLE ORAL ONCE
Status: COMPLETED | OUTPATIENT
Start: 2022-06-02 | End: 2022-06-02

## 2022-06-02 RX ORDER — SODIUM CHLORIDE, SODIUM LACTATE, POTASSIUM CHLORIDE, CALCIUM CHLORIDE 600; 310; 30; 20 MG/100ML; MG/100ML; MG/100ML; MG/100ML
INJECTION, SOLUTION INTRAVENOUS CONTINUOUS PRN
Status: DISCONTINUED | OUTPATIENT
Start: 2022-06-02 | End: 2022-06-02

## 2022-06-02 RX ORDER — DIAPER,BRIEF,INFANT-TODD,DISP
EACH MISCELLANEOUS 4 TIMES DAILY PRN
Status: DISCONTINUED | OUTPATIENT
Start: 2022-06-02 | End: 2022-06-06 | Stop reason: HOSPADM

## 2022-06-02 RX ORDER — BISACODYL 10 MG
10 SUPPOSITORY, RECTAL RECTAL DAILY
Status: DISCONTINUED | OUTPATIENT
Start: 2022-06-02 | End: 2022-06-06 | Stop reason: HOSPADM

## 2022-06-02 RX ORDER — SENNOSIDES 8.6 MG
2 TABLET ORAL DAILY
Status: DISCONTINUED | OUTPATIENT
Start: 2022-06-02 | End: 2022-06-02

## 2022-06-02 RX ORDER — ROCURONIUM BROMIDE 10 MG/ML
INJECTION, SOLUTION INTRAVENOUS AS NEEDED
Status: DISCONTINUED | OUTPATIENT
Start: 2022-06-02 | End: 2022-06-02

## 2022-06-02 RX ORDER — AMOXICILLIN 250 MG
2 CAPSULE ORAL 2 TIMES DAILY
Status: DISCONTINUED | OUTPATIENT
Start: 2022-06-02 | End: 2022-06-06 | Stop reason: HOSPADM

## 2022-06-02 RX ORDER — ALBUMIN, HUMAN INJ 5% 5 %
SOLUTION INTRAVENOUS CONTINUOUS PRN
Status: DISCONTINUED | OUTPATIENT
Start: 2022-06-02 | End: 2022-06-02

## 2022-06-02 RX ORDER — PROPOFOL 10 MG/ML
INJECTION, EMULSION INTRAVENOUS AS NEEDED
Status: DISCONTINUED | OUTPATIENT
Start: 2022-06-02 | End: 2022-06-02

## 2022-06-02 RX ORDER — MIDAZOLAM HYDROCHLORIDE 2 MG/2ML
INJECTION, SOLUTION INTRAMUSCULAR; INTRAVENOUS AS NEEDED
Status: DISCONTINUED | OUTPATIENT
Start: 2022-06-02 | End: 2022-06-02

## 2022-06-02 RX ORDER — ONDANSETRON 2 MG/ML
INJECTION INTRAMUSCULAR; INTRAVENOUS AS NEEDED
Status: DISCONTINUED | OUTPATIENT
Start: 2022-06-02 | End: 2022-06-02

## 2022-06-02 RX ORDER — FENTANYL CITRATE 50 UG/ML
INJECTION, SOLUTION INTRAMUSCULAR; INTRAVENOUS AS NEEDED
Status: DISCONTINUED | OUTPATIENT
Start: 2022-06-02 | End: 2022-06-02

## 2022-06-02 RX ORDER — EPHEDRINE SULFATE 50 MG/ML
INJECTION INTRAVENOUS AS NEEDED
Status: DISCONTINUED | OUTPATIENT
Start: 2022-06-02 | End: 2022-06-02

## 2022-06-02 RX ADMIN — ROCURONIUM BROMIDE 20 MG: 50 INJECTION, SOLUTION INTRAVENOUS at 09:14

## 2022-06-02 RX ADMIN — SODIUM CHLORIDE, SODIUM LACTATE, POTASSIUM CHLORIDE, AND CALCIUM CHLORIDE: .6; .31; .03; .02 INJECTION, SOLUTION INTRAVENOUS at 08:54

## 2022-06-02 RX ADMIN — PHENYLEPHRINE HYDROCHLORIDE 40 MCG/MIN: 10 INJECTION INTRAVENOUS at 09:02

## 2022-06-02 RX ADMIN — EPHEDRINE SULFATE 10 MG: 50 INJECTION INTRAVENOUS at 09:21

## 2022-06-02 RX ADMIN — EPHEDRINE SULFATE 10 MG: 50 INJECTION INTRAVENOUS at 09:10

## 2022-06-02 RX ADMIN — SUGAMMADEX 314 MG: 100 INJECTION, SOLUTION INTRAVENOUS at 09:44

## 2022-06-02 RX ADMIN — OXYCODONE HYDROCHLORIDE 2.5 MG: 5 TABLET ORAL at 01:57

## 2022-06-02 RX ADMIN — TAMSULOSIN HYDROCHLORIDE 0.4 MG: 0.4 CAPSULE ORAL at 15:31

## 2022-06-02 RX ADMIN — Medication 6 MG: at 20:35

## 2022-06-02 RX ADMIN — FENTANYL CITRATE 50 MCG: 50 INJECTION INTRAMUSCULAR; INTRAVENOUS at 08:54

## 2022-06-02 RX ADMIN — LIDOCAINE HYDROCHLORIDE 50 MG: 10 INJECTION, SOLUTION EPIDURAL; INFILTRATION; INTRACAUDAL; PERINEURAL at 08:54

## 2022-06-02 RX ADMIN — LEVETIRACETAM 500 MG: 500 TABLET, FILM COATED ORAL at 08:04

## 2022-06-02 RX ADMIN — ONDANSETRON 4 MG: 2 INJECTION INTRAMUSCULAR; INTRAVENOUS at 09:44

## 2022-06-02 RX ADMIN — EPHEDRINE SULFATE 5 MG: 50 INJECTION INTRAVENOUS at 09:25

## 2022-06-02 RX ADMIN — NOREPINEPHRINE BITARTRATE 3 MCG/MIN: 1 INJECTION, SOLUTION, CONCENTRATE INTRAVENOUS at 09:28

## 2022-06-02 RX ADMIN — BISACODYL 10 MG: 10 SUPPOSITORY RECTAL at 08:04

## 2022-06-02 RX ADMIN — EPHEDRINE SULFATE 5 MG: 50 INJECTION INTRAVENOUS at 09:00

## 2022-06-02 RX ADMIN — MIDAZOLAM 1 MG: 1 INJECTION INTRAMUSCULAR; INTRAVENOUS at 09:30

## 2022-06-02 RX ADMIN — SENNOSIDES AND DOCUSATE SODIUM 2 TABLET: 50; 8.6 TABLET ORAL at 08:03

## 2022-06-02 RX ADMIN — IOHEXOL 35 ML: 350 INJECTION, SOLUTION INTRAVENOUS at 10:02

## 2022-06-02 RX ADMIN — PRAVASTATIN SODIUM 40 MG: 40 TABLET ORAL at 15:31

## 2022-06-02 RX ADMIN — PROPOFOL 150 MG: 10 INJECTION, EMULSION INTRAVENOUS at 08:54

## 2022-06-02 RX ADMIN — FENTANYL CITRATE 50 MCG: 50 INJECTION INTRAMUSCULAR; INTRAVENOUS at 08:48

## 2022-06-02 RX ADMIN — ACETAMINOPHEN 650 MG: 325 TABLET, FILM COATED ORAL at 15:29

## 2022-06-02 RX ADMIN — ALBUMIN (HUMAN): 12.5 INJECTION, SOLUTION INTRAVENOUS at 09:14

## 2022-06-02 RX ADMIN — LEVETIRACETAM 500 MG: 500 TABLET, FILM COATED ORAL at 20:35

## 2022-06-02 RX ADMIN — Medication 296 ML: at 11:57

## 2022-06-02 RX ADMIN — ROCURONIUM BROMIDE 30 MG: 50 INJECTION, SOLUTION INTRAVENOUS at 08:54

## 2022-06-02 NOTE — ASSESSMENT & PLAN NOTE
POD 5 left craniotomy for SDH evacuation  · Patient presented to the ED due to findings on CT scan of an acute on chronic SDH  · Very hard of hearing  · On exam, generalized weakness but no focal weakness appreciated  GCS 15 with slowed mentation  Imaging:  · CT head w/o, 5/31/22: Left subdural collection that is mostly due to pneumocephalus and fluid is overall stable in size following removal of one of 2 subdural drains but small amount of residual subdural blood products mildly increased    Stable mass effect with 1 cm of left-to-right shift  · [facility] 6/2/22: Left subdural collection and pneumocephalus stable  Stable left to right midline shift  Final read pending  Plan:  · Continue frequent neurological checks  · MRA reviewed and shows occlusion of MMA  However, after review with neuroendovascular attending, this may be secondary to pneumocephalus  · Subdural drain was d/c'ed 6/1/22  · STAT CT head with any neurological decline including drop GCS of 2pts within 1 hr   · SBP <160mmHg  · Keppra 500mg x7 days post op  · Hold all antiplatelet and anticoagulation medications  · Patient does not take any home AC/AP at baseline  · Medical management and pain control per primary team  · PT/OT, ok to mobilize as tolerated  · DVT PPX: SCDs, hold SQH for MMA embo today  · Tentative plan is for left MMA embolization today  Neurosurgery will continue to follow  Call with questions/concerns

## 2022-06-02 NOTE — ANESTHESIA PREPROCEDURE EVALUATION
Procedure:  IR CEREBRAL ANGIOGRAPHY / INTERVENTION    Relevant Problems   CARDIO   (+) Hypercholesterolemia      MUSCULOSKELETAL   (+) Primary osteoarthritis of left knee      NEURO/PSYCH   (+) 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HCC)   (+) SDH (subdural hematoma) (HCC)      Nervous and Auditory   (+) Hearing loss      Other   (+) Borderline hypertension        Physical Exam    Airway    Mallampati score: III  TM Distance: >3 FB  Neck ROM: full     Dental   upper dentures,     Cardiovascular      Pulmonary      Other Findings        Anesthesia Plan  ASA Score- 3     Anesthesia Type- general with ASA Monitors  Additional Monitors:   Airway Plan: ETT  Plan Factors-    Chart reviewed  EKG reviewed  Existing labs reviewed  Patient summary reviewed  Induction- intravenous  Postoperative Plan-     Informed Consent- Anesthetic plan and risks discussed with patient  I personally reviewed this patient with the CRNA  Discussed and agreed on the Anesthesia Plan with the CRNA  Larry Gutierres

## 2022-06-02 NOTE — PROGRESS NOTES
[facility]   ICU Transfer Note - Liudmila Garcia 1940, 79 y o  male MRN: [id number]  Unit/Bed#: ICU 11 Encounter: [id number]  Primary Care Provider: Katie Tay DO   Date and time admitted to hospital: 5/27/2022  3:04 PM    * SDH (subdural hematoma) ([address] )  Assessment & Plan  · Initial fall in march with small SDH, increased after fall in April and then decreased  Initially managed with serial imaging and exams  On short interval outpatient CT 5/27 found to have new complex mixed density L sided holo hemisphere subdural hematomas, new from prior 5/10  · Sent to the ER for neurosurgical evaluation   · Taken to OR 5/28 for L craniotomy with SD drain placement x 2  · Both drains now removed   · [facility] 6/2 - stable 2 3 cm subdural fluid collection with subdural pneumocephalus, scattered acute blood products, similar persistent mass effect on left cerebral hemisphere, partial effacement of left lateral ventricle, and 0 8 cm rightward midline shift    · Attempted MMA embolization 6/2 but aborted 2/2 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                  Urinary retention  Assessment & Plan  · Finley placed for urinary retention  · Voiding trial today, retention protocol   · Flomax  · If fails again consult Urology     Constipation  Assessment & Plan  · Increased bowel regimen to sennakot S BID, milalax daily, dulcolax suppository daily   · Consider magnesium citrate vs enema if no BM tomorrow     Hypercholesterolemia  Assessment & Plan  · Statin      Code Status: Level 1 - Full Code  POA:    POLST:      Reason for ICU admission:   SDH requiring evacuation    Active problems:   Principal Problem:    SDH (subdural hematoma) (HCC)  Active Problems:    Urinary retention    Hypercholesterolemia Constipation  Resolved Problems:    * No resolved hospital problems  *      Consultants:   Neurosurgery  PMR     History of Present Illness:   Per Dr Phoebe Santos "79 y o  male who presents following a routine follow-up head CT for history of recent fall with subdural hemorrhage  CT head today showed enlarging acute on chronic subdural hematoma with increasing midline shift  He was subsequently contacted by the neurosurgery team who recommended he attend the emergency department for evaluation for possible evacuation  Currently patient reports no new symptoms  He denies numbness, weakness, headache or dizziness  He is hard of hearing but this is normal for him  He denies any anticoagulant or anti-platelet use"     Summary of clinical course:   Admitted to ICU for close monitoring, received DDAVP for AP reversal  Was taken on 5/28 for craniotomy with SD drain placement x2  Both drains have since been removed  Repeat CTH imaging stable with persistent small L subdural collection and pneumocephalus, stable L to R shift  He was taken with neuro IR for MMA embolization this morning, the procedure was aborted because his L MMA originates off his ophthalmic artery  He was brought back to the ICU in stable condition  Remains GCS 15 though deaf and sometimes difficult to obtain answers to orienting questions  He is stable for transfer to SD2 level of care  Recent or scheduled procedures:   5/28 Craniotomy with SD drain x2  5/30: drain x1 removed  6/1: drain x 1 removed  6/2: Attempted MMA embolization, aborted 2/2 MMA originating from ophthalmic artery     Outstanding/pending diagnostics:   None     Cultures:   None        Mobilization Plan:   PT/OT   PMR   Rehab     Nutrition Plan:   Regular diet     Invasive Devices Review  Invasive Devices  Report    Peripheral Intravenous Line  Duration           Peripheral IV 06/01/22 Distal;Dorsal (posterior); Right Forearm 1 day    Peripheral IV 06/02/22 Proximal;Right;Ventral (anterior) Forearm <1 day                Rationale for remaining devices: None     VTE Pharmacologic Prophylaxis: Pharmacologic VTE Prophylaxis contraindicated due to SDH, pending neurosurgical clearance   VTE Mechanical Prophylaxis: sequential compression device    Discharge Plan:   Patient should be ready for discharge to rehab when medically cleared     Initial Physical Therapy Recommendations: Rehab  Initial Occupational Therapy Recommendations: Rehab  Initial /Plan: Rehab    Home medications that are not reordered and reason why:   None     Spoke with Dr Heena Tinajero  regarding transfer  Please contact critical care via [address] [place] [address] with any questions or concerns  Portions of the record may have been created with voice recognition software  Occasional wrong word or "sound a like" substitutions may have occurred due to the inherent limitations of voice recognition software  Read the chart carefully and recognize, using context, where substitutions have occurred

## 2022-06-02 NOTE — ASSESSMENT & PLAN NOTE
· Increased bowel regimen to sennakot S BID, milalax daily, dulcolax suppository daily   · Consider magnesium citrate vs enema if no BM tomorrow

## 2022-06-02 NOTE — PROGRESS NOTES
[address] [facility]  Progress Note - Curly Minors 1940, 79 y o  male MRN: [id number]  Unit/Bed#: ICU 11 Encounter: [id number]  Primary Care Provider: Inez Taylor DO   Date and time admitted to hospital: 5/27/2022  3:04 PM    * SDH (subdural hematoma) [facility])  Assessment & Plan  POD 5 left craniotomy for SDH evacuation  · Patient presented to the ED due to findings on CT scan of an acute on chronic SDH  · Very hard of hearing  · On exam, generalized weakness but no focal weakness appreciated  GCS 15 with slowed mentation  Imaging:  · CT head w/o, 5/31/22: Left subdural collection that is mostly due to pneumocephalus and fluid is overall stable in size following removal of one of 2 subdural drains but small amount of residual subdural blood products mildly increased    Stable mass effect with 1 cm of left-to-right shift  · [facility] 6/2/22: Left subdural collection and pneumocephalus stable  Stable left to right midline shift  Final read pending  Plan:  · Continue frequent neurological checks  · MRA reviewed and shows occlusion of MMA  However, after review with neuroendovascular attending, this may be secondary to pneumocephalus  · Subdural drain was d/c'ed 6/1/22  · STAT CT head with any neurological decline including drop GCS of 2pts within 1 hr   · SBP <160mmHg  · Keppra 500mg x7 days post op  · Hold all antiplatelet and anticoagulation medications  · Patient does not take any home AC/AP at baseline  · Medical management and pain control per primary team  · PT/OT, ok to mobilize as tolerated  · DVT PPX: SCDs, hold SQH for MMA embo today  · Tentative plan is for left MMA embolization today  Neurosurgery will continue to follow  Call with questions/concerns  Subjective/Objective     Chief Complaint: Pt admits to generalized weakness  Follow up s/p craniotomy for SDH evacuation       Subjective: Pt is hard of hearing with with some mumbled speech though most of his speech is comprehensible  Patient admits to generalized weakness  He denies having headache or dizziness  He denies any new numbness/tingling  Objective: Alert and awake, No acute distress  I/O       05/31 0701 06/01 0700 06/01 0701 06/02 0700 06/02 0701 06/03 0700    P  O  1460 1020     I V  (mL/kg)  520 (6 6)     IV Piggyback       Total Intake(mL/kg) 1460 (18 5) 1540 (19 6)     Urine (mL/kg/hr) 1288 (0 7) 1565 (0 8)     Drains 37 0     Total Output 1325 1565     Net +135 -25                  Invasive Devices  Report    Peripheral Intravenous Line  Duration           Peripheral IV 06/01/22 Distal;Dorsal (posterior); Right Forearm 1 day    Peripheral IV 06/02/22 Proximal;Right;Ventral (anterior) Forearm <1 day          Drain  Duration           Urethral Catheter 16 Fr  2 days                Physical Exam:  Vitals: Blood pressure 122/72, pulse 70, temperature 98 7 °F (37 1 °C), temperature source Oral, resp  rate 19, height 5' 11" (1 803 m), weight 78 5 kg (173 lb), SpO2 98 %  ,Body mass index is 24 13 kg/m²  General appearance:  Appears stated age  Head: Left frontal incision site CDI  No drainage from the drain removal site  Eyes: EOMI, PERRL  Neck: supple, symmetrical, trachea midline   Lungs: non labored breathing  Heart: regular heart rate  Neurologic:   Mental status: Alert, oriented to person, place and the date  Speech a bit mumbled  Cranial nerves: grossly intact (Cranial nerves II-XII)  Sensory: Motor: moving all extremities, strength 4+/5 throughout     Coordination:  no drift in bilateral upper extremities      Lab Results:  Results from last 7 days   Lab Units 06/02/22  0524 06/01/22  0535 05/31/22  0615 05/30/22  0512   WBC Thousand/uL 4 59 6 23 8 22 7 69   HEMOGLOBIN g/dL 14 0 13 9 13 5 13 1   HEMATOCRIT % 41 9 40 0 40 4 39 2   PLATELETS Thousands/uL 407* 401* 392* 416*   NEUTROS PCT % 61 71  --  74   MONOS PCT % 13* 9  --  10     Results from last 7 days   Lab Units 06/02/22  0524 06/01/22  0535 05/31/22  0615 05/29/22  0506 05/28/22  0512 05/27/22  1600   POTASSIUM mmol/L 4 0 4 5 4 2   < > 4 0 4 2   CHLORIDE mmol/L 104 103 105   < > 109* 107   CO2 mmol/L 27 25 27   < > 26 25   BUN mg/dL 16 15 13   < > 14 15   CREATININE mg/dL 0 65 0 61 0 55*   < > 0 62 0 78   CALCIUM mg/dL 9 3 9 4 9 1   < > 8 9 9 0   ALK PHOS U/L  --   --   --   --  77 79   ALT U/L  --   --   --   --  29 30   AST U/L  --   --   --   --  22 18    < > = values in this interval not displayed  Results from last 7 days   Lab Units 06/02/22  0524 05/30/22  0512 05/29/22  0506   MAGNESIUM mg/dL 2 4 2 2 2 2     Results from last 7 days   Lab Units 06/02/22  0524 05/30/22  0512 05/28/22  0512   PHOSPHORUS mg/dL 3 5 2 9 3 4     Results from last 7 days   Lab Units 06/02/22  0524 05/29/22  0506 05/28/22  0512 05/27/22  1600   INR  1 02 1 10 1 04 1 06   PTT seconds 31 28  --  30       Imaging Studies: I have personally reviewed pertinent reports and I have personally reviewed pertinent films in PACS    EKG, Pathology, and Other Studies: I have personally reviewed pertinent reports  VTE Pharmacologic Prophylaxis: Reason for no pharmacologic prophylaxis MMA embo today    VTE Mechanical Prophylaxis: sequential compression device    PLEASE NOTE:  This encounter may have been completed utilizing the [email or web address] Direct Speech Voice Recognition Software  Grammatical errors, random word insertions, pronoun errors and incomplete sentences are occasional consequences of the system due to software limitations, ambient noise and hardware issues  These may be missed by proof reading prior to affixing electronic signature  Any questions or concerns about the content, text or information contained within the body of this dictation should be directly addressed to the advanced practitioner or physician for clarification

## 2022-06-02 NOTE — PLAN OF CARE
Problem: Potential for Falls  Goal: Patient will remain free of falls  Description: INTERVENTIONS:  - Educate patient/family on patient safety including physical limitations  - Instruct patient to call for assistance with activity   - Consult OT/PT to assist with strengthening/mobility   - Keep Call bell within reach  - Keep bed low and locked with side rails adjusted as appropriate  - Keep care items and personal belongings within reach  - Initiate and maintain comfort rounds  - Make Fall Risk Sign visible to staff  - Offer Toileting every 2 Hours, in advance of need  - Initiate/Maintain 2alarm  - Obtain necessary fall risk management equipment: 2  - Apply yellow socks and bracelet for high fall risk patients  - Consider moving patient to room near nurses station  Outcome: Progressing     Problem: MOBILITY - ADULT  Goal: Maintain or return to baseline ADL function  Description: INTERVENTIONS:  -  Assess patient's ability to carry out ADLs; assess patient's baseline for ADL function and identify physical deficits which impact ability to perform ADLs (bathing, care of mouth/teeth, toileting, grooming, dressing, etc )  - Assess/evaluate cause of self-care deficits   - Assess range of motion  - Assess patient's mobility; develop plan if impaired  - Assess patient's need for assistive devices and provide as appropriate  - Encourage maximum independence but intervene and supervise when necessary  - Involve family in performance of ADLs  - Assess for home care needs following discharge   - Consider OT consult to assist with ADL evaluation and planning for discharge  - Provide patient education as appropriate  Outcome: Progressing  Goal: Maintains/Returns to pre admission functional level  Description: INTERVENTIONS:  - Perform BMAT or MOVE assessment daily    - Set and communicate daily mobility goal to care team and patient/family/caregiver     - Collaborate with rehabilitation services on mobility goals if consulted  - Perform Range of Motion 2 times a day  - Reposition patient every 2 hours  - Dangle patient 2 times a day  - Stand patient 2 times a day  - Ambulate patient 2 times a day  - Out of bed to chair 2 times a day   - Out of bed for meals 2 times a day  - Out of bed for toileting  - Record patient progress and toleration of activity level   Outcome: Progressing     Problem: Prexisting or High Potential for Compromised Skin Integrity  Goal: Skin integrity is maintained or improved  Description: INTERVENTIONS:  - Identify patients at risk for skin breakdown  - Assess and monitor skin integrity  - Assess and monitor nutrition and hydration status  - Monitor labs   - Assess for incontinence   - Turn and reposition patient  - Assist with mobility/ambulation  - Relieve pressure over bony prominences  - Avoid friction and shearing  - Provide appropriate hygiene as needed including keeping skin clean and dry  - Evaluate need for skin moisturizer/barrier cream  - Collaborate with interdisciplinary team   - Patient/family teaching  - Consider wound care consult   Outcome: Progressing     Problem: Nutrition/Hydration-ADULT  Goal: Nutrient/Hydration intake appropriate for improving, restoring or maintaining nutritional needs  Description: Monitor and assess patient's nutrition/hydration status for malnutrition  Collaborate with interdisciplinary team and initiate plan and interventions as ordered  Monitor patient's weight and dietary intake as ordered or per policy  Utilize nutrition screening tool and intervene as necessary  Determine patient's food preferences and provide high-protein, high-caloric foods as appropriate       INTERVENTIONS:  - Monitor oral intake, urinary output, labs, and treatment plans  - Assess nutrition and hydration status and recommend course of action  - Evaluate amount of meals eaten  - Assist patient with eating if necessary   - Allow adequate time for meals  - Recommend/ encourage appropriate diets, oral nutritional supplements, and vitamin/mineral supplements  - Order, calculate, and assess calorie counts as needed  - Recommend, monitor, and adjust tube feedings and TPN/PPN based on assessed needs  - Assess need for intravenous fluids  - Provide specific nutrition/hydration education as appropriate  - Include patient/family/caregiver in decisions related to nutrition  Outcome: Progressing     Problem: NEUROSENSORY - ADULT  Goal: Achieves stable or improved neurological status  Description: INTERVENTIONS  - Monitor and report changes in neurological status  - Monitor vital signs such as temperature, blood pressure, glucose, and any other labs ordered   - Initiate measures to prevent increased intracranial pressure  - Monitor for seizure activity and implement precautions if appropriate      Outcome: Progressing  Goal: Achieves maximal functionality and self care  Description: INTERVENTIONS  - Monitor swallowing and airway patency with patient fatigue and changes in neurological status  - Encourage and assist patient to increase activity and self care     - Encourage visually impaired, hearing impaired and aphasic patients to use assistive/communication devices  Outcome: Progressing     Problem: CARDIOVASCULAR - ADULT  Goal: Maintains optimal cardiac output and hemodynamic stability  Description: INTERVENTIONS:  - Monitor I/O, vital signs and rhythm  - Monitor for S/S and trends of decreased cardiac output  - Administer and titrate ordered vasoactive medications to optimize hemodynamic stability  - Assess quality of pulses, skin color and temperature  - Assess for signs of decreased coronary artery perfusion  - Instruct patient to report change in severity of symptoms  Outcome: Progressing  Goal: Absence of cardiac dysrhythmias or at baseline rhythm  Description: INTERVENTIONS:  - Continuous cardiac monitoring, vital signs, obtain 12 lead EKG if ordered  - Administer antiarrhythmic and heart rate control medications as ordered  - Monitor electrolytes and administer replacement therapy as ordered  Outcome: Progressing     Problem: RESPIRATORY - ADULT  Goal: Achieves optimal ventilation and oxygenation  Description: INTERVENTIONS:  - Assess for changes in respiratory status  - Assess for changes in mentation and behavior  - Position to facilitate oxygenation and minimize respiratory effort  - Oxygen administered by appropriate delivery if ordered  - Initiate smoking cessation education as indicated  - Encourage broncho-pulmonary hygiene including cough, deep breathe, Incentive Spirometry  - Assess the need for suctioning and aspirate as needed  - Assess and instruct to report SOB or any respiratory difficulty  - Respiratory Therapy support as indicated  Outcome: Progressing     Problem: GASTROINTESTINAL - ADULT  Goal: Maintains or returns to baseline bowel function  Description: INTERVENTIONS:  - Assess bowel function  - Encourage oral fluids to ensure adequate hydration  - Administer IV fluids if ordered to ensure adequate hydration  - Administer ordered medications as needed  - Encourage mobilization and activity  - Consider nutritional services referral to assist patient with adequate nutrition and appropriate food choices  Outcome: Progressing  Goal: Maintains adequate nutritional intake  Description: INTERVENTIONS:  - Monitor percentage of each meal consumed  - Identify factors contributing to decreased intake, treat as appropriate  - Assist with meals as needed  - Monitor I&O, weight, and lab values if indicated  - Obtain nutrition services referral as needed  Outcome: Progressing     Problem: GENITOURINARY - ADULT  Goal: Maintains or returns to baseline urinary function  Description: INTERVENTIONS:  - Assess urinary function  - Encourage oral fluids to ensure adequate hydration if ordered  - Administer IV fluids as ordered to ensure adequate hydration  - Administer ordered medications as needed  - Offer frequent toileting  - Follow urinary retention protocol if ordered  Outcome: Progressing     Problem: METABOLIC, FLUID AND ELECTROLYTES - ADULT  Goal: Electrolytes maintained within normal limits  Description: INTERVENTIONS:  - Monitor labs and assess patient for signs and symptoms of electrolyte imbalances  - Administer electrolyte replacement as ordered  - Monitor response to electrolyte replacements, including repeat lab results as appropriate  - Instruct patient on fluid and nutrition as appropriate  Outcome: Progressing  Goal: Fluid balance maintained  Description: INTERVENTIONS:  - Monitor labs   - Monitor I/O and WT  - Instruct patient on fluid and nutrition as appropriate  - Assess for signs & symptoms of volume excess or deficit  Outcome: Progressing     Problem: SKIN/TISSUE INTEGRITY - ADULT  Goal: Skin Integrity remains intact(Skin Breakdown Prevention)  Description: Assess:  -Perform Alejandro assessment every 2  -Clean and moisturize skin every 2  -Inspect skin when repositioning, toileting, and assisting with ADLS  -Assess under medical devices such as 2 every 2  -Assess extremities for adequate circulation and sensation     Bed Management:  -Have minimal linens on bed & keep smooth, unwrinkled  -Change linens as needed when moist or perspiring  -Avoid sitting or lying in one position for more than 2 hours while in bed  -Keep HOB at 2degrees     Toileting:  -Offer bedside commode  -Assess for incontinence every 2  -Use incontinent care products after each incontinent episode such as 2    Activity:  -Mobilize patient 2 times a day  -Encourage activity and walks on unit  -Encourage or provide ROM exercises   -Turn and reposition patient every 2 Hours  -Use appropriate equipment to lift or move patient in bed  -Instruct/ Assist with weight shifting every 2 when out of bed in chair  -Consider limitation of chair time 2 hour intervals    Skin Care:  -Avoid use of baby powder, tape, friction and shearing, hot water or constrictive clothing  -Relieve pressure over bony prominences using 2  -Do not massage red bony areas    Next Steps:  -Teach patient strategies to minimize risks such as 2   -Consider consults to  interdisciplinary teams such as 2  Outcome: Progressing  Goal: Incision(s), wounds(s) or drain site(s) healing without S/S of infection  Description: INTERVENTIONS  - Assess and document dressing, incision, wound bed, drain sites and surrounding tissue  - Provide patient and family education  - Perform skin care/dressing changes every 2  Outcome: Progressing     Problem: HEMATOLOGIC - ADULT  Goal: Maintains hematologic stability  Description: INTERVENTIONS  - Assess for signs and symptoms of bleeding or hemorrhage  - Monitor labs  - Administer supportive blood products/factors as ordered and appropriate  Outcome: Progressing     Problem: MUSCULOSKELETAL - ADULT  Goal: Maintain or return mobility to safest level of function  Description: INTERVENTIONS:  - Assess patient's ability to carry out ADLs; assess patient's baseline for ADL function and identify physical deficits which impact ability to perform ADLs (bathing, care of mouth/teeth, toileting, grooming, dressing, etc )  - Assess/evaluate cause of self-care deficits   - Assess range of motion  - Assess patient's mobility  - Assess patient's need for assistive devices and provide as appropriate  - Encourage maximum independence but intervene and supervise when necessary  - Involve family in performance of ADLs  - Assess for home care needs following discharge   - Consider OT consult to assist with ADL evaluation and planning for discharge  - Provide patient education as appropriate  Outcome: Progressing     Problem: COPING  Goal: Pt/Family able to verbalize concerns and demonstrate effective coping strategies  Description: INTERVENTIONS:  - Assist patient/family to identify coping skills, available support systems and cultural and spiritual values  - Provide emotional support, including active listening and acknowledgement of concerns of patient and caregivers  - Reduce environmental stimuli, as able  - Provide patient education  - Assess for spiritual pain/suffering and initiate spiritual care, including notification of Pastoral Care or [place] based community as needed  - Assess effectiveness of coping strategies  Outcome: Progressing  Goal: Will report anxiety at manageable levels  Description: INTERVENTIONS:  - Administer medication as ordered  - Teach and encourage coping skills  - Provide emotional support  - Assess patient/family for anxiety and ability to cope  Outcome: Progressing     Problem: PAIN - ADULT  Goal: Verbalizes/displays adequate comfort level or baseline comfort level  Description: Interventions:  - Encourage patient to monitor pain and request assistance  - Assess pain using appropriate pain scale  - Administer analgesics based on type and severity of pain and evaluate response  - Implement non-pharmacological measures as appropriate and evaluate response  - Consider cultural and social influences on pain and pain management  - Notify physician/advanced practitioner if interventions unsuccessful or patient reports new pain  Outcome: Progressing     Problem: INFECTION - ADULT  Goal: Absence or prevention of progression during hospitalization  Description: INTERVENTIONS:  - Assess and monitor for signs and symptoms of infection  - Monitor lab/diagnostic results  - Monitor all insertion sites, i e  indwelling lines, tubes, and drains  - Monitor endotracheal if appropriate and nasal secretions for changes in amount and color  - [place] appropriate cooling/warming therapies per order  - Administer medications as ordered  - Instruct and encourage patient and family to use good hand hygiene technique  - Identify and instruct in appropriate isolation precautions for identified infection/condition  Outcome: Progressing     Problem: SAFETY ADULT  Goal: Patient will remain free of falls  Description: INTERVENTIONS:  - Educate patient/family on patient safety including physical limitations  - Instruct patient to call for assistance with activity   - Consult OT/PT to assist with strengthening/mobility   - Keep Call bell within reach  - Keep bed low and locked with side rails adjusted as appropriate  - Keep care items and personal belongings within reach  - Initiate and maintain comfort rounds  - Make Fall Risk Sign visible to staff  - Offer Toileting every 2 Hours, in advance of need  - Initiate/Maintain 2alarm  - Obtain necessary fall risk management equipment: 2  - Apply yellow socks and bracelet for high fall risk patients  - Consider moving patient to room near nurses station  Outcome: Progressing  Goal: Maintain or return to baseline ADL function  Description: INTERVENTIONS:  -  Assess patient's ability to carry out ADLs; assess patient's baseline for ADL function and identify physical deficits which impact ability to perform ADLs (bathing, care of mouth/teeth, toileting, grooming, dressing, etc )  - Assess/evaluate cause of self-care deficits   - Assess range of motion  - Assess patient's mobility; develop plan if impaired  - Assess patient's need for assistive devices and provide as appropriate  - Encourage maximum independence but intervene and supervise when necessary  - Involve family in performance of ADLs  - Assess for home care needs following discharge   - Consider OT consult to assist with ADL evaluation and planning for discharge  - Provide patient education as appropriate  Outcome: Progressing  Goal: Maintains/Returns to pre admission functional level  Description: INTERVENTIONS:  - Perform BMAT or MOVE assessment daily    - Set and communicate daily mobility goal to care team and patient/family/caregiver  - Collaborate with rehabilitation services on mobility goals if consulted  - Perform Range of Motion 2 times a day    - Reposition patient every 2 hours  - Dangle patient 2 times a day  - Stand patient 2 times a day  - Ambulate patient 2 times a day  - Out of bed to chair 2 times a day   - Out of bed for meals 2 times a day  - Out of bed for toileting  - Record patient progress and toleration of activity level   Outcome: Progressing     Problem: DISCHARGE PLANNING  Goal: Discharge to home or other facility with appropriate resources  Description: INTERVENTIONS:  - Identify barriers to discharge w/patient and caregiver  - Arrange for needed discharge resources and transportation as appropriate  - Identify discharge learning needs (meds, wound care, etc )  - Arrange for interpretive services to assist at discharge as needed  - Refer to Case Management Department for coordinating discharge planning if the patient needs post-hospital services based on physician/advanced practitioner order or complex needs related to functional status, cognitive ability, or social support system  Outcome: Progressing     Problem: Knowledge Deficit  Goal: Patient/family/caregiver demonstrates understanding of disease process, treatment plan, medications, and discharge instructions  Description: Complete learning assessment and assess knowledge base    Interventions:  - Provide teaching at level of understanding  - Provide teaching via preferred learning methods  Outcome: Progressing

## 2022-06-02 NOTE — ASSESSMENT & PLAN NOTE
POD 6 left craniotomy for SDH evacuation (DKO, 5/28/22)  PPD 1 s/p Cerebral Angiography- Left MMA embolization was not performed  · Patient presented to the ED due to findings on CT scan of an acute on chronic SDH  · Very hard of hearing  · On exam, generalized weakness but no focal weakness appreciated  GCS 15 with slowed mentation  Imaging:  · CT head w/o, 5/31/22: Left subdural collection that is mostly due to pneumocephalus and fluid is overall stable in size following removal of one of 2 subdural drains but small amount of residual subdural blood products mildly increased    Stable mass effect with 1 cm of left-to-right shift  · [facility] 6/2/22: Left subdural collection and pneumocephalus stable  Stable left to right midline shift  Final read pending  Plan:  · Continue frequent neurological checks  · Subdural drain was d/c'ed 6/1/22  · STAT CT head with any neurological decline including drop GCS of 2pts within 1 hr   · SBP <160mmHg  · Keppra 500mg x7 days post op  · Hold all antiplatelet and anticoagulation medications  · Patient does not take any home AC/AP at baseline  Discussed with pt/family that pt that in addition to avoiding AC/AP therapy, pt should also avoid NSAIDs  · Per report pt took a lot of NSAID for left knee pain  · Medical management and pain control per primary team  · PT/OT, ok to mobilize as tolerated  · DVT PPX: SCDs, may resume SQH later this evening or tomorrow as indicated  · No further neurosurgical intervention anticipated at this time  Neurosurgery will see pt PRN during the remainder of this hospitalization  Pt will have follow up with neurosurgery for 2 week and 6 wk POVs with repeat CTH  Please call with any questions/concerns

## 2022-06-02 NOTE — SEDATION DOCUMENTATION
Cerebral Arteriogram completed per Dr Nino Signs  No intervention at this time  Right radial artery with TR band 15 ml air  Good patent hemostasis  Plan is to extubate and transport up to ICU via bed with anesthesia

## 2022-06-02 NOTE — BRIEF OP NOTE (RAD/CATH)
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

## 2022-06-02 NOTE — PROGRESS NOTES
Daily Progress Note - Critical Care   Eleazar Holbrook 79 y o  male MRN: [id number]  Unit/Bed#: ICU 11 Encounter: [id number]    ----------------------------------------------------------------------------------------  HPI/24hr events: Patient remaining off DVT ppx for SDH, remaining SD drain removed 6/1 without complication  Repeat CTH obtained with formal read pending, plan for potential MMA Embolization with NSGY today  Neuro exam remains unchanged, 4/5 strength in LEIF RAMIREZ, with persistent speech difficulty  No acute events overnight    ---------------------------------------------------------------------------------------  SUBJECTIVE  "I feel fine", denies HA, N/V, CP, SOB  Review of Systems   Constitutional: Negative  HENT: Negative  Eyes: Negative  Respiratory: Negative for shortness of breath  Cardiovascular: Negative for chest pain  Gastrointestinal: Negative for abdominal pain, nausea and vomiting  Endocrine: Negative  Genitourinary: Negative  Musculoskeletal: Negative  Skin: Negative  Allergic/Immunologic: Negative  Neurological: Negative for light-headedness and headaches  Hematological: Negative  Psychiatric/Behavioral: Negative  Review of systems was reviewed and negative unless stated above in HPI/24-hour events   ---------------------------------------------------------------------------------------  Assessment and Plan:    Neuro:    Diagnosis: Acute on Chronic SDH, s/p left craniotomy 5/28  o Plan: Repeat CTH obtained this AM, formal read pending   Previous [facility] 5/31 showing pneumocephalus with increased midling shift  o L SD drain pulled per NSGY 6/1 without complications  o F/u NSGY for possible MMA embolization today  o Q4H neurochecks  o Continue Keppra 500mg BID for 7 days  o Regulate sleep/wake cycle  - Melatonin 6mg  o [facility] ICU   Diagnosis: Analgesia  o Plan: PRN tylenol (2 doses/24hrs)  o PRN Oxycodone (1 dose/24hrs)      CV:    Diagnosis: Hypotension  o Plan: SBPs dropped to 70's yesterday post ambulation, suspect orthostasis  o ECHO- preserved LV EF, G1DD  o S/p 500mL Isolyte with improvement in BP  o SBP currently 120's, continue to trend   Diagnosis: HLD   o Plan: Continue pravastatin      Pulm:   Diagnosis: No active issues  o Plan: Currently on RA  o IS/ Pulm toileting    GI:    Diagnosis: Constipation  o Plan: No BM since admission   o Dulcolax suppository given without BM  o Continue Colace, Miralax, Senna increased  o Consider Enema pending possible MMA embo   Diagnosis: No additional issues  o Plan: Zofran for nausea  o Previously tolerating regular diet without complications      :    Diagnosis: Urinary retention  o Plan: Trial brooks cath removal today  o Continue flomax   Diagnosis: No additional issues  o Plan: BUN/Cr stable  o Strict I and O      F/E/N:    F- No maintenance fluids   E- Replete as needed   N- NPO, previously tolerating regular diet w/ thin liquids      Heme/Onc:    Diagnosis: No active issues  o Plan: DVT ppx held per NSGY  o Tentative plan to restart today pending NSGY  o Hgb stable at 14   o  this AM, continue to trend  o Transfuse as needed for hbg <7  o DVT ppx- SCDs    Endo:    Diagnosis: No active issues  o Plan: BG within goal off SSI  o Continue to trend on BMP    ID:    Diagnosis: No active issues  o Plan: T max overnight 98 7  o WBC stable at 4  o Continue to trend WBC and fever curve    MSK/Skin:    Diagnosis: No active issues  o Plan: Q2H repositioning while in bed  o Pt OT  o OOBTC when appropriate    Patient appropriate for transfer out of the ICU today?: No  Disposition: Continue Stepdown Level 1 level of care   Code Status: Level 1 - Full Code  ---------------------------------------------------------------------------------------  ICU CORE MEASURES    Prophylaxis   VTE Pharmacologic Prophylaxis: Pharmacologic VTE Prophylaxis contraindicated due to SDH, possible MMA embolization  VTE Mechanical Prophylaxis: sequential compression device  Stress Ulcer Prophylaxis: Prophylaxis Not Indicated     Invasive Devices Review  Invasive Devices  Report    Peripheral Intravenous Line  Duration           Peripheral IV 22 Distal;Dorsal (posterior); Right Forearm 1 day    Peripheral IV 22 Proximal;Right;Ventral (anterior) Forearm <1 day          Drain  Duration           Urethral Catheter 16 Fr  2 days              Can any invasive devices be discontinued today? No  ---------------------------------------------------------------------------------------  OBJECTIVE    Vitals   Vitals:    22 0300 22 0400 22 0500 22 0600   BP: 121/73  115/70 122/72   Pulse: 70 78 80 70   Resp: 15 19 15 19   Temp:  98 7 °F (37 1 °C)     TempSrc:  Oral     SpO2: 96% 96% 96% 98%   Weight:       Height:         Temp (24hrs), Av °F (36 7 °C), Min:97 4 °F (36 3 °C), Max:98 7 °F (37 1 °C)  Current: Temperature: 98 7 °F (37 1 °C)    Respiratory:  SpO2: SpO2: 98 %, SpO2 Activity: SpO2 Activity: At Rest  Nasal Cannula O2 Flow Rate (L/min): 2 L/min    Invasive/non-invasive ventilation settings   Respiratory  Report   Lab Data (Last 4 hours)    None         O2/Vent Data (Last 4 hours)    None                Physical Exam  Vitals reviewed  Constitutional:       Comments:  LUE LLE,  MIGUELE RLE   HENT:      Head: Normocephalic  Right Ear: External ear normal       Left Ear: External ear normal       Nose: Nose normal       Mouth/Throat:      Mouth: Mucous membranes are dry  Pharynx: Oropharynx is clear  Eyes:      Pupils: Pupils are equal, round, and reactive to light  Cardiovascular:      Rate and Rhythm: Normal rate and regular rhythm  Pulses: Normal pulses  Heart sounds: Normal heart sounds  No murmur heard  No gallop  Pulmonary:      Effort: Pulmonary effort is normal  No respiratory distress  Breath sounds: Normal breath sounds     Abdominal:      General: Abdomen is flat  Bowel sounds are normal  There is no distension  Palpations: Abdomen is soft  Tenderness: There is no abdominal tenderness  Musculoskeletal:      Right lower leg: No edema  Left lower leg: No edema  Skin:     General: Skin is warm and dry  Capillary Refill: Capillary refill takes less than 2 seconds  Neurological:      Mental Status: He is alert               Laboratory and Diagnostics:  Results from last 7 days   Lab Units 06/02/22  0524 06/01/22  0535 05/31/22  0615 05/30/22  0512 05/29/22  0506 05/28/22  0512 05/27/22  1600   WBC Thousand/uL 4 59 6 23 8 22 7 69 9 14 4 94 5 73   HEMOGLOBIN g/dL 14 0 13 9 13 5 13 1 12 0 13 1 14 2   HEMATOCRIT % 41 9 40 0 40 4 39 2 35 5* 39 7 42 6   PLATELETS Thousands/uL 407* 401* 392* 416* 375 330 380   NEUTROS PCT % 61 71  --  74  --  66 66   MONOS PCT % 13* 9  --  10  --  11 11     Results from last 7 days   Lab Units 06/01/22  0535 05/31/22  0615 05/30/22  0512 05/29/22  1437 05/29/22  0506 05/28/22  0512 05/27/22  1600   SODIUM mmol/L 135* 135* 137 139 135* 138 139   POTASSIUM mmol/L 4 5 4 2 4 0 3 9 4 2 4 0 4 2   CHLORIDE mmol/L 103 105 107 105 106 109* 107   CO2 mmol/L 25 27 24 28 23 26 25   ANION GAP mmol/L 7 3* 6 6 6 3* 7   BUN mg/dL 15 13 13 15 13 14 15   CREATININE mg/dL 0 61 0 55* 0 65 0 79 0 61 0 62 0 78   CALCIUM mg/dL 9 4 9 1 9 2 9 4 8 8 8 9 9 0   GLUCOSE RANDOM mg/dL 123 122 105 104 105 92 94   ALT U/L  --   --   --   --   --  29 30   AST U/L  --   --   --   --   --  22 18   ALK PHOS U/L  --   --   --   --   --  77 79   ALBUMIN g/dL  --   --   --   --   --  3 2* 3 6   TOTAL BILIRUBIN mg/dL  --   --   --   --   --  0 58 0 52     Results from last 7 days   Lab Units 05/30/22  0512 05/29/22  0506 05/28/22  0512   MAGNESIUM mg/dL 2 2 2 2 2 4   PHOSPHORUS mg/dL 2 9  --  3 4      Results from last 7 days   Lab Units 05/29/22  0506 05/28/22  0512 05/27/22  1600   INR  1 10 1 04 1 06   PTT seconds 28  --  30              ABG:    VBG: Micro        EKG: NSR rate 86  Imagin/2 CTH pending   I have personally reviewed pertinent reports  Intake and Output  I/O        07 0700  0701   0700    P  O  1460 1020    I V  (mL/kg)  520 (6 6)    Total Intake(mL/kg) 1460 (18 5) 1540 (19 6)    Urine (mL/kg/hr) 1288 (0 7) 1565 (0 8)    Drains 37 0    Total Output 1325 1565    Net +135 -25              UOP:  40 ml/hr     Height and Weights   Height: 5' 11" (180 3 cm)  IBW (Ideal Body Weight): 75 3 kg  Body mass index is 24 13 kg/m²  Weight (last 2 days)     Date/Time Weight    22 1500 78 5 (173)        Nutrition       Diet Orders   (From admission, onward)             Start     Ordered    22 0001  Diet NPO; Sips with meds  Diet effective midnight        References:    Nutrtion Support Algorithm Enteral vs  Parenteral   Question Answer Comment   Diet Type NPO    NPO Except: Sips with meds    RD to adjust diet per protocol?  No        22 0903    22 1124  Dietary nutrition supplements  Once        Question Answer Comment   Select Supplement: Ensure Enlive-Chocolate    Frequency Breakfast, Lunch, Dinner        22 1123              Active Medications  Scheduled Meds:  Current Facility-Administered Medications   Medication Dose Route Frequency Provider Last Rate    acetaminophen  650 mg Oral Q6H PRN , CRNP      bisacodyl  10 mg Rectal Daily PRN Mort Bullocks, CRNP      docusate sodium  100 mg Oral BID , CRNP      hydrocortisone   Topical 4x Daily PRN Min Javed Juárez MD      levETIRAcetam  500 mg Oral Q12H [facility] , CRNP      melatonin  6 mg Oral HS Min Javed Juárez MD      ondansetron  4 mg Intravenous Q6H PRN , CRNP      oxyCODONE  5 mg Oral Q4H PRN , CRNP      Or    oxyCODONE  2 5 mg Oral Q4H PRN , CRNP      polyethylene glycol  17 g Oral Daily , CRNP  pravastatin  40 mg Oral Daily With RONY Heranndez      senna  1 tablet Oral Daily RONY Santiago      tamsulosin  0 4 mg Oral Daily With RONY Hernandez       Continuous Infusions:     PRN Meds:   acetaminophen, 650 mg, Q6H PRN  bisacodyl, 10 mg, Daily PRN  hydrocortisone, , 4x Daily PRN  ondansetron, 4 mg, Q6H PRN  oxyCODONE, 5 mg, Q4H PRN   Or  oxyCODONE, 2 5 mg, Q4H PRN      Allergies   No Known Allergies  ---------------------------------------------------------------------------------------  Advance Directive and Living Will:      Power of :    POLST:    ---------------------------------------------------------------------------------------  Care Time Delivered:   No Critical Care time spent     RONY Paez    Portions of the record may have been created with voice recognition software  Occasional wrong word or "sound a like" substitutions may have occurred due to the inherent limitations of voice recognition software    Read the chart carefully and recognize, using context, where substitutions have occurred Double Island Pedicle Flap Text: The defect edges were debeveled with a #15 scalpel blade.  Given the location of the defect, shape of the defect and the proximity to free margins a double island pedicle advancement flap was deemed most appropriate.  Using a sterile surgical marker, an appropriate advancement flap was drawn incorporating the defect, outlining the appropriate donor tissue and placing the expected incisions within the relaxed skin tension lines where possible.    The area thus outlined was incised deep to adipose tissue with a #15 scalpel blade.  The skin margins were undermined to an appropriate distance in all directions around the primary defect and laterally outward around the island pedicle utilizing iris scissors.  There was minimal undermining beneath the pedicle flap.

## 2022-06-02 NOTE — ANESTHESIA POSTPROCEDURE EVALUATION
Post-Op Assessment Note    CV Status:  Stable  Pain Score: 0    Pain management: adequate     Mental Status:  Alert and awake   Hydration Status:  Euvolemic   PONV Controlled:  Controlled   Airway Patency:  Patent      Post Op Vitals Reviewed: Yes      Staff: CRNA, Anesthesiologist         No complications documented      BP   108/57   Temp   97 8   Pulse  68   Resp   14   SpO2   98

## 2022-06-02 NOTE — ASSESSMENT & PLAN NOTE
· Initial fall in march with small SDH, increased after fall in April and then decreased  Initially managed with serial imaging and exams  On short interval outpatient CT 5/27 found to have new complex mixed density L sided holo hemisphere subdural hematomas, new from prior 5/10  · Sent to the ER for neurosurgical evaluation   · Taken to OR 5/28 for L craniotomy with SD drain placement x 2  · Both drains now removed   · [facility] 6/2 - stable 2 3 cm subdural fluid collection with subdural pneumocephalus, scattered acute blood products, similar persistent mass effect on left cerebral hemisphere, partial effacement of left lateral ventricle, and 0 8 cm rightward midline shift    · Attempted MMA embolization 6/2 but aborted 2/2 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

## 2022-06-02 NOTE — ASSESSMENT & PLAN NOTE
· Ifnley placed for urinary retention  · Voiding trial today, retention protocol   · Flomax  · If fails again consult Urology

## 2022-06-02 NOTE — CASE MANAGEMENT
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    Overweight (BMI 25 0-29 9) 2021    Negative depression screening 2019    Medicare annual wellness visit, subsequent 2019    Hypercholesterolemia 2018    Borderline hypertension 2018    Hearing loss 2009      LOS (days): 6  Geometric Mean LOS (GMLOS) (days): 6 60  Days to GMLOS:0 8     OBJECTIVE:  Risk of Unplanned Readmission Score: 12 5         Current admission status: Inpatient   Preferred Pharmacy:   [facility] DR JOCELINE HERRERA [facility] [address], [address] [address] [address] [place], [place] [id number]  Phone: [phone] Fax: [phone]    Primary Care Provider: Jim Hoyos DO    Primary Insurance: MEDICARE  Secondary Insurance: [address] [place] [address] DETAILS:    Plan for IR today for an MMA embolization  CM will continue to follow up, pt is being followed by [facility] and will likely d/c there when appropriate

## 2022-06-02 NOTE — ARC ADMISSION
ARC continues to follow patient's case at this time, monitoring for medical stability and functional progress  Noted that patient is for possible MMA embolization today  Will need postop PT/OT evals as appropriate, and will continue to follow

## 2022-06-02 NOTE — DISCHARGE INSTRUCTIONS
Today, you underwent a diagnostic cerebral angiogram under the care of Dr Rosa Salcedo   ? The following instructions will help you care for yourself, or be cared for upon your return home today  These are guidelines for your care right after your surgery only  ? Notify Your Doctor or Nurse if you have any of the following:  ? SYMPTOMS OF WOUND INFECTION--   Increased pain in or around the incision   Swelling around the incision  Any drainage from the incision  Incision separates or opens up  Warmth in the tissues around the incision  Redness or tenderness on the skin near the incision   Fever (temperature greater than 101 degrees F)   ? NEUROLOGICAL CHANGES--  Change in alertness  Increased sleepiness   Nausea and vomiting   New onset of numbness or weakness in arms or legs   New problems with your bowels or bladder  New or worse problems with balance or walking  Seizures, new or worsening  ? UNRELIEVED HEADACHE PAIN--  New or increased pain unrelieved with pain medications   Pain associated with nausea and vomiting   Pain associated with other symptoms  ? QUESTIONS OR PROBLEMS--  Any questions or problems that you are unsure about  Wound Care:  Keep Incision Clean and Dry   You may shower daily, but do not soak incision  Pat dry after showering  No tub baths, soaking, swimming for 1 week after angiogram    You do not need to cover the incision  Mild to moderate bruising and tenderness to the site is expected and may last up to 1-2 weeks after your procedure  ?  A closure device was placed at the catheter insertion site  This is MRI compatible  Remove the dressing 24 hours after your procedure  If your groin site is bleeding, apply firm pressure for 10 minutes  Reinforce dressing rather than removing and checking frequently  If continues to bleed through the dressing after 1 hour, contact your neurosurgeon's office  Anticipatory Education:  ?   PAIN MED W/ Acetaminophen (Tylenol)  --IF a prescription for pain medicine has been sent home with you:  --Narcotic pain medication may cause constipation  Be sure to take stool softeners or laxatives while you are on narcotic pain medication  --Do not drive after taking prescription pain medicine  ?  If this medicine is too strong, or no longer necessary, or we did NOT recommend/prescribe oral narcotics, you may take:   - Tylenol Extra-strength/Acetaminophen, 2 tablets every 4-6 hours as needed for mild pain  DO NOT TAKE MORE THAN 4000MG PER DAY from combined sources  NOTE: Remember to eat when taking pain medicines in order to avoid nausea  Watch for constipation  Eat plenty of fruits, vegetables, juices, and drink 6-8 glasses of water each day  Constipation: Stay active and drink at least 6-8 cups of fluid each day to prevent constipation  If you need a laxative or stool softener follow the package directions or consult with your local pharmacists if you have questions  ? After anesthesia, rest for 24 hours  Do not drive, drink alcohol beverages or make any important decisions during this time  General anesthesia may cause sore throat, jaw discomfort or muscle aches  These symptoms can last for one or two days  Activity: Please follow these instructions:  Advance your activity as you can tolerate  You may do light house work; nothing strenuous   You may walk all you want  You may go up and down the steps  Use the railing for support  Do not do excessive bending, straining or heavy lifting for 48 hours after your procedure  Do not drive or return to work until you are instructed   It is normal for your energy level and sleep patterns to change after surgery  Get extra sleep at night and take naps during the day to help you feel less tired  Take rest periods during the day  Complete recovery may take several weeks  ?  You may resume driving after 91-76 hours recovery  You may return to work after 48 hours of recovery     ?  Diet:  Your doctor has recommended that you follow these diet instructions at home  Refer to the patient education materials you received during your hospital stay  If you would like more nutrition counseling, ask your doctor about making an appointment with an outpatient dietitian  Resume your home diet  ? Medications:  Please resume your home medications as instructed  ? Home Supplies and Equipment:  none  Additional Contacts:  ? CONTACTS FOR NEUROSURGERY: You may call your neurosurgeons office if you have questions between 8:30 am and 4:30 pm  You may request to speak to the nurse practitioner who is available Monday through Friday  ?  For off hours or the weekend you may call your neurosurgeon's office to leave a message  Discharge Instructions  Evacuation of subdural hematoma    Activity:  Do not lift, push or pull more than 10 pounds for 2 weeks  Avoid bending, lifting and twisting for 2 weeks  No running  No athletic activities until cleared  No driving for at least 2 weeks or until cleared by Neurosurgery  When able to shower, continue to use clean towel and washcloth for 2 weeks post-op  Do not use a hair dryer, and avoid hair products such as mousse, oils, and gels  Do not brush your hair away from the incision since this will put strain on the suture line  Do not dye or perm hair for 6 weeks or until cleared by physician  Continue to change bed linens and pajamas more frequently  Wear clean clothes daily  May walk as tolerated  Recommend 4 short walks daily  Surgical incision care:  Keep dressings in place for 3 days  After 3 days, incisions may be left open to air, but should remain clean  Keep incisions dry for 3 days  May shower after 3 days using a baby shampoo including head incision  Rinse off shampoo and pat dry  Avoid rubbing the incision but gently massage hair  Do not immerse the incisions in water for 6 weeks  Staples/suture will be removed at your 2 week postoperative visit     Do not apply any creams or ointments to the incision, unless otherwise instructed by Lexa Sanchez  [facility]  Contact office if increasing redness, drainage, pain or swelling occurs around the incisions or if you develop a fever greater than 101F  Do not dye/perm hair or use any hair products until cleared by Neurosurgery  Postoperative medication:  [facility] will provide pain medication in the postoperative period  All prescriptions must come from a single practice  Take medications as prescribed  Call office with any questions/concerns  May use over the counter Tylenol  No NSAIDs (ie  Ibuprofen, Aleve, Advil, Naproxen)  Please contact office for questions regarding dosage and modifications  No antiplatelet or anticoagulation medication (ie  Coumadin, Aspirin, Plavix) until cleared by [facility], unless otherwise instructed  Please contact [facility] if you have any questions about the effects of any of your medications on blood clotting  Do not operate heavy machinery or vehicles while taking sedating medications  Use a bowel regimen while on opioids as they induce constipation  Ie  Senokot-S, Miralax, Colace, etc  Increase fiber and water intake  Follow-up for 2 week incision check/suture removal with a repeat CT head without contrast prior to visit  Follow-up for a 6 week post-operative visit  Please complete a repeat CT head without contrast to be completed prior to visit  ** Please notify the office if incision becomes red, swollen, tender, or has increased drainage, and temp>101  Return to the ER if you experience increased headache, drowsiness, weakness, nausea/vomiting, or seizures  **

## 2022-06-02 NOTE — SEDATION DOCUMENTATION
Transported to ICU bed 11 via ICU bed with anesthesia  VSS  No change in neuro status  Report given to Ester Hogan RN

## 2022-06-03 LAB
ANION GAP SERPL CALCULATED.3IONS-SCNC: 5 MMOL/L (ref 4–13)
BUN SERPL-MCNC: 14 MG/DL (ref 5–25)
CALCIUM SERPL-MCNC: 9.6 MG/DL (ref 8.3–10.1)
CHLORIDE SERPL-SCNC: 103 MMOL/L (ref 100–108)
CO2 SERPL-SCNC: 27 MMOL/L (ref 21–32)
CREAT SERPL-MCNC: 0.73 MG/DL (ref 0.6–1.3)
ERYTHROCYTE [DISTWIDTH] IN BLOOD BY AUTOMATED COUNT: 12.8 % (ref 11.6–15.1)
FLUAV RNA RESP QL NAA+PROBE: NEGATIVE
FLUBV RNA RESP QL NAA+PROBE: NEGATIVE
GFR SERPL CREATININE-BSD FRML MDRD: 86 ML/MIN/1.73SQ M
GLUCOSE SERPL-MCNC: 110 MG/DL (ref 65–140)
HCT VFR BLD AUTO: 38.4 % (ref 36.5–49.3)
HGB BLD-MCNC: 13 G/DL (ref 12–17)
MCH RBC QN AUTO: 32.2 PG (ref 26.8–34.3)
MCHC RBC AUTO-ENTMCNC: 33.9 G/DL (ref 31.4–37.4)
MCV RBC AUTO: 95 FL (ref 82–98)
PLATELET # BLD AUTO: 404 THOUSANDS/UL (ref 149–390)
PMV BLD AUTO: 9.2 FL (ref 8.9–12.7)
POTASSIUM SERPL-SCNC: 4.1 MMOL/L (ref 3.5–5.3)
RBC # BLD AUTO: 4.04 MILLION/UL (ref 3.88–5.62)
RSV RNA RESP QL NAA+PROBE: NEGATIVE
SARS-COV-2 RNA RESP QL NAA+PROBE: NEGATIVE
SODIUM SERPL-SCNC: 135 MMOL/L (ref 136–145)
WBC # BLD AUTO: 5.98 THOUSAND/UL (ref 4.31–10.16)

## 2022-06-03 PROCEDURE — 97116 GAIT TRAINING THERAPY: CPT

## 2022-06-03 PROCEDURE — 97535 SELF CARE MNGMENT TRAINING: CPT

## 2022-06-03 PROCEDURE — 99024 POSTOP FOLLOW-UP VISIT: CPT | Performed by: PHYSICIAN ASSISTANT

## 2022-06-03 PROCEDURE — 97530 THERAPEUTIC ACTIVITIES: CPT

## 2022-06-03 PROCEDURE — 99232 SBSQ HOSP IP/OBS MODERATE 35: CPT | Performed by: STUDENT IN AN ORGANIZED HEALTH CARE EDUCATION/TRAINING PROGRAM

## 2022-06-03 PROCEDURE — 85027 COMPLETE CBC AUTOMATED: CPT | Performed by: PHYSICIAN ASSISTANT

## 2022-06-03 PROCEDURE — 80048 BASIC METABOLIC PNL TOTAL CA: CPT | Performed by: PHYSICIAN ASSISTANT

## 2022-06-03 PROCEDURE — 97112 NEUROMUSCULAR REEDUCATION: CPT

## 2022-06-03 PROCEDURE — 0241U HB NFCT DS VIR RESP RNA 4 TRGT: CPT | Performed by: PHYSICIAN ASSISTANT

## 2022-06-03 RX ORDER — HEPARIN SODIUM 5000 [USP'U]/ML
5000 INJECTION, SOLUTION INTRAVENOUS; SUBCUTANEOUS EVERY 8 HOURS SCHEDULED
Status: DISCONTINUED | OUTPATIENT
Start: 2022-06-03 | End: 2022-06-06 | Stop reason: HOSPADM

## 2022-06-03 RX ORDER — HEPARIN SODIUM 5000 [USP'U]/ML
5000 INJECTION, SOLUTION INTRAVENOUS; SUBCUTANEOUS EVERY 8 HOURS SCHEDULED
Status: CANCELLED | OUTPATIENT
Start: 2022-06-03

## 2022-06-03 RX ADMIN — ACETAMINOPHEN 650 MG: 325 TABLET, FILM COATED ORAL at 15:44

## 2022-06-03 RX ADMIN — PRAVASTATIN SODIUM 40 MG: 40 TABLET ORAL at 15:44

## 2022-06-03 RX ADMIN — Medication 6 MG: at 20:10

## 2022-06-03 RX ADMIN — LEVETIRACETAM 500 MG: 500 TABLET, FILM COATED ORAL at 08:50

## 2022-06-03 RX ADMIN — HEPARIN SODIUM 5000 UNITS: 5000 INJECTION INTRAVENOUS; SUBCUTANEOUS at 13:01

## 2022-06-03 RX ADMIN — HEPARIN SODIUM 5000 UNITS: 5000 INJECTION INTRAVENOUS; SUBCUTANEOUS at 21:22

## 2022-06-03 RX ADMIN — TAMSULOSIN HYDROCHLORIDE 0.4 MG: 0.4 CAPSULE ORAL at 15:44

## 2022-06-03 NOTE — OCCUPATIONAL THERAPY NOTE
Occupational Therapy Treatment Note:      06/03/22 1015   OT Last Visit   OT Visit Date 06/03/22   Note Type   Note Type Treatment   Restrictions/Precautions   Other Precautions Hard of hearing; Fall Risk;Cognitive; Chair Alarm; Bed Alarm   Pain Assessment   Pain Assessment Tool FLACC   Pain Score No Pain   Pain Rating: FLACC (Rest) - Face 0   Pain Rating: FLACC (Rest) - Legs 0   Pain Rating: FLACC (Rest) - Activity 0   Pain Rating: FLACC (Rest) - Cry 0   Pain Rating: FLACC (Rest) - Consolability 0   Score: FLACC (Rest) 0   Pain Rating: FLACC (Activity) - Face 0   Pain Rating: FLACC (Activity) - Legs 0   Pain Rating: FLACC (Activity) - Activity 0   Pain Rating: FLACC (Activity) - Cry 0   Pain Rating: FLACC (Activity) - Consolability 0   Score: FLACC (Activity) 0   ADL   Where Assessed Chair   Grooming Assistance 4  Minimal Assistance   UB Bathing Assistance 4  Minimal Assistance   LB Bathing Assistance 3  Moderate Assistance   UB Dressing Assistance 3  Moderate Assistance   LB Dressing Assistance 2  Maximal Assistance   LB Dressing Comments pant donning  asst to thread b feet and to pull over hips in stance   Toileting Assistance  2  Maximal Assistance   Functional Standing Tolerance   Time p+ dynamic standing  balance during clothing management   Transfers   Sit to Stand 3  Moderate assistance   Additional items Assist x 1   Stand to Sit 3  Moderate assistance   Additional items Assist x 1   Cognition   Overall Cognitive Status Impaired   Arousal/Participation Alert   Attention Attends with cues to redirect   Memory Decreased recall of precautions;Decreased recall of recent events   Following Commands Follows one step commands with increased time or repetition   Comments pt is [place] and when fatigued requires incrased time to move and motor plann with r side ie pant donning shirt donning  pt required hand over hand guiding to initaite task   pt also very [place] and reportedly does not like to leave hearing headset inplace Activity Tolerance   Activity Tolerance Patient tolerated treatment well   Assessment   Assessment pt participated in am ot session and was seen focusing on adls seated in chair  pt required mod asst ub and max asst lb dressing and min and mod asst for ub and lb bathing  pt was mod asst for thorough grooming and max asst to balance during bathing and clothing management in stance  pt demosntrates r ue weakness and slowness during tasks but was able to wash face with r hand  pt required hand over hand to initaite task will follow focusing on goals from ie  pts supportive dtr and wife present towards end of session   Plan   Treatment Interventions ADL retraining;Functional transfer training; Endurance training;Patient/family training;Cognitive reorientation; Activityengagement   Goal Expiration Date 06/12/22   OT Treatment Day 2   OT Frequency 3-5x/wk   Recommendation   OT Discharge Recommendation Post acute rehabilitation services   OT - OK to Discharge Yes   AM-PAC Daily Activity Inpatient   Lower Body Dressing 2   Bathing 2   Toileting 2   Upper Body Dressing 2   Grooming 3   Eating 3   Daily Activity Raw Score 14   Daily Activity Standardized Score (Calc for Raw Score >=11) 33 39   AM-PAC Applied Cognition Inpatient   Following a Speech/Presentation 2   Understanding Ordinary Conversation 3   Taking Medications 2   Remembering Where Things Are Placed or Put Away 2   Remembering List of 4-5 Errands 2   Taking Care of Complicated Tasks 1   Applied Cognition Raw Score 12   Applied Cognition Standardized Score 28 82

## 2022-06-03 NOTE — ARC ADMISSION
After reviewing updates patient has been approved for ARC pending COVID test and medical clearance  Per  Will A  Patient is cleared for discharge  ARC Liaison contacted patients daughter Jackolyn Heimlich to discuss rehab: Jackolyn Heimlich placed [address] on speaker phone to enable patients spouse and son Jaquelin to join in conversation  ARC Liaison:  introduced self, explained role, ARC program/services, ARC three locations, transfer process to rehab unit, anticipated rehab length of stay, and suggested items patient may need for rehab, all questions answered  ARC liaison also informed patients daughter Jackolyn Heimlich of limited bed availability at [facility], currently only a semi-private room is available on ARC 9th floor at [facility], there is an available male bed at [facility], [place] [address] has limited specialists services to that location and anticipate Monday a single room will be available on [facility] 4th floor pending a patient discharging  Jackolyn Heimlich and family stated they prefer patient admitting to [facility] location on Monday into a private room and understand it is pending that the current patient remains cleared for discharge  ARC Liaison provided ARC Admissions contact information if needed  ARC Liaison provided update with same to CM Will [address] and included patient will need a COVID test prior to admission

## 2022-06-03 NOTE — CASE MANAGEMENT
Case Management Discharge Planning Note    Patient name Sonu Higgins  [facility] 962-22 MRN [id number]  : 1940 Date 6/3/2022       Current Admission Date: 2022  Current Admission Diagnosis:SDH (subdural hematoma) [facility])   Patient Active Problem List    Diagnosis Date Noted    Constipation 2022    Urinary retention 2022    [facility] 2022    Primary osteoarthritis of left knee 2022    Hygroma 2022    Right hand pain     Carpal tunnel syndrome on right     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[facility] ) 2021    Overweight (BMI 25 0-29 9) 2021    Negative depression screening 2019    Medicare annual wellness visit, subsequent 2019    Hypercholesterolemia 2018    Borderline hypertension 2018    Hearing loss 2009      LOS (days): 7  Geometric Mean LOS (GMLOS) (days): 6 60  Days to GMLOS:-0 2     OBJECTIVE:  Risk of Unplanned Readmission Score: 12 69         Current admission status: Inpatient   Preferred Pharmacy:   [facility] [address] JOCELINE HERRERA [place] [address] [address] [place] [address]  Phone: [phone] Fax: [phone]    Primary Care Provider: Meera Scott DO    Primary Insurance: MEDICARE  Secondary Insurance: [address] [place] [address] DETAILS:    CM met with pt's dtr to discuss d/c plan  Pt being reviewed by [facility] for potential admission  Awaiting their decision, pt likely to d/c in the coming days  Dtr in agreement with d/c plan

## 2022-06-03 NOTE — ASSESSMENT & PLAN NOTE
· Finley placed for urinary retention  · Voiding trial today, retention protocol   · Flomax 0 4mg  · If urine trial failure, consult Urology

## 2022-06-03 NOTE — PHYSICAL THERAPY NOTE
Physical Therapy Progress Note     06/03/22 1010   PT Last Visit   PT Visit Date 06/03/22   Note Type   Note Type Treatment   Pain Assessment   Pain Assessment Tool FLACC   Pain Rating: FLACC (Rest) - Face 0   Pain Rating: FLACC (Rest) - Legs 0   Pain Rating: FLACC (Rest) - Activity 0   Pain Rating: FLACC (Rest) - Cry 0   Pain Rating: FLACC (Rest) - Consolability 0   Score: FLACC (Rest) 0   Pain Rating: FLACC (Activity) - Face 0   Pain Rating: FLACC (Activity) - Legs 0   Pain Rating: FLACC (Activity) - Activity 0   Pain Rating: FLACC (Activity) - Cry 0   Pain Rating: FLACC (Activity) - Consolability 0   Score: FLACC (Activity) 0   Restrictions/Precautions   Other Precautions Hard of hearing; Fall Risk;Cognitive; Chair Alarm; Bed Alarm  (Pt  working with occupational therapy post session )   Subjective   Subjective The pt  notes that he is dizzy during the session  Pt  more interactive with his wife  Bed Mobility   Supine to Sit 3  Moderate assistance   Additional items Assist x 2; Increased time required;Verbal cues;LE management   Transfers   Sit to Stand 3  Moderate assistance   Additional items Assist x 1; Increased time required;Verbal cues   Stand to Sit 3  Moderate assistance   Additional items Assist x 1; Increased time required;Verbal cues;Armrests   Ambulation/Elevation   Gait pattern Excessively slow; Step to;Short stride; Inconsistent savana; Shuffling;Decreased foot clearance;Narrow ILIANA; Forward Flexion; Poor UE support;Retropulsion   Gait Assistance 2  Maximal assist   Additional items Assist x 2   Assistive Device Rolling walker   Distance 20 feet x 2  Balance   Static Sitting Fair   Dynamic Sitting Fair -   Static Standing Poor +   Ambulatory Poor +   Activity Tolerance   Activity Tolerance Patient tolerated treatment well;Patient limited by fatigue   Nurse [address], RN  Assessment   Prognosis Good   Problem List Decreased strength;Decreased endurance;Decreased mobility; Impaired balance;Decreased cognition;Decreased coordination;Decreased safety awareness; Impaired hearing   Assessment The pt  initially followed commands to sit at the side of the bed and then stand, but then he did not follow them  He required considerable assistance to ambulate as one person was necessary in order to correct for his retropulsion and balance and another to maximally pull the walker and the patient forward  He had a very short shuffling stride with a near scissoring gait pattern  He fatigued with ambulation, and he noted increased dizziness  An extended rest was provided when his wife and daughter arrived  They were updated with his current progress, and were present during the second ambulatory trial which was much of the same as the first  The patient was able to ambulate a short distance and then noted dizziness again  He was much more interactive once his wife arrived  Of note, the session began while utilizing the pocket talker, but he wanted it removed prior to ambulation  He was in the chair working with occupational therapy post session  EHOB cushion and chair alarm were on the chair  Barriers to Discharge Inaccessible home environment;Decreased caregiver support   Goals   Patient Goals None expressed by the patient  STG Expiration Date 06/08/22   PT Treatment Day 2   Plan   Treatment/Interventions Functional transfer training;LE strengthening/ROM; Therapeutic exercise; Endurance training;Cognitive reorientation;Patient/family training;Bed mobility;Gait training   Progress Progressing toward goals   PT Frequency   (3-6x a week )   Recommendation   PT Discharge Recommendation Post acute rehabilitation services   Equipment Recommended [facility] Recommended Wheeled walker   AM-PAC Basic Mobility Inpatient   Turning in Bed Without Bedrails 3   Lying on Back to Sitting on Edge of Flat Bed 2   Moving Bed to Chair 2   Standing Up From Chair 2   Walk in Room 2   Climb 3-5 Stairs 1   Basic Mobility Inpatient Raw Score 12   Basic Mobility Standardized Score 32 23   Highest Level Of Mobility   -HLM Goal 4: Move to chair/commode   -HLM Achieved 6: Walk 10 steps or more       An AM-PAC Basic Mobility standardized score less than 40 78 suggests the patient may benefit from discharge to post-acute rehab services      Junior Angeles, PTA

## 2022-06-03 NOTE — PROGRESS NOTES
[facility]  Progress Note - Curly Minors 1940, 79 y o  male MRN: [id number]  Unit/Bed#: [facility] 601-01 Encounter: [id number]  Primary Care Provider: Inez Taylor DO   Date and time admitted to hospital: 5/27/2022  3:04 PM    * SDH (subdural hematoma) [facility])  Assessment & Plan  POD 6 left craniotomy for SDH evacuation (DKO, 5/28/22)  PPD 1 s/p Cerebral Angiography- Left MMA embolization was not performed  · Patient presented to the ED due to findings on CT scan of an acute on chronic SDH  · Very hard of hearing  · On exam, generalized weakness but no focal weakness appreciated  GCS 15 with slowed mentation  Imaging:  · CT head w/o, 5/31/22: Left subdural collection that is mostly due to pneumocephalus and fluid is overall stable in size following removal of one of 2 subdural drains but small amount of residual subdural blood products mildly increased    Stable mass effect with 1 cm of left-to-right shift  · 14 [facility] 6/2/22: Left subdural collection and pneumocephalus stable  Stable left to right midline shift  Final read pending  Plan:  · Continue frequent neurological checks  · Subdural drain was d/c'ed 6/1/22  · STAT CT head with any neurological decline including drop GCS of 2pts within 1 hr   · SBP <160mmHg  · Keppra 500mg x7 days post op  · Hold all antiplatelet and anticoagulation medications  · Patient does not take any home AC/AP at baseline  Discussed with pt/family that pt that in addition to avoiding AC/AP therapy, pt should also avoid NSAIDs  · Per report pt took a lot of NSAID for left knee pain  · Medical management and pain control per primary team  · PT/OT, ok to mobilize as tolerated  · DVT PPX: SCDs, may resume SQH later this evening or tomorrow as indicated  · No further neurosurgical intervention anticipated at this time  Neurosurgery will see pt PRN during the remainder of this hospitalization   Pt will have follow up with neurosurgery for 2 week and 6 wk POVs with repeat CTH  Please call with any questions/concerns  Subjective/Objective     Chief Complaint: Follow up s/p Left Craniotomy for SDH evacuation  Subjective: Pt denied having headache  Per pt's wife pt had reported having dizziness earlier when he was mobilizing with therapy  Per report patient was able to walk a few steps outside his room with therapy  Pt denied any new numbness, tingling or weakness  Objective: Alert and awake, No acute distress  I/O       06/01 0701 06/02 0700 06/02 0701 06/03 0700 06/03 0701  06/04 0700    P  O  1020 510 358    I V  (mL/kg) 520 (6 6) 600 (7 8)     IV Piggyback  250     Total Intake(mL/kg) 1540 (19 6) 1360 (17 7) 358 (4 6)    Urine (mL/kg/hr) 1565 (0 8) 800 (0 4)     Drains 0      Stool  0     Total Output 1565 800     Net -25 +560 +358           Unmeasured Urine Occurrence  1 x     Unmeasured Stool Occurrence  4 x 1 x          Invasive Devices  Report    Peripheral Intravenous Line  Duration           Peripheral IV 06/01/22 Distal;Dorsal (posterior); Right Forearm 2 days    Peripheral IV 06/02/22 Proximal;Right;Ventral (anterior) Forearm 1 day                Physical Exam:  Vitals: Blood pressure 110/69, pulse 88, temperature 98 8 °F (37 1 °C), resp  rate 16, height 5' 11" (1 803 m), weight 77 kg (169 lb 11 2 oz), SpO2 96 %  ,Body mass index is 23 67 kg/m²  General appearance:  Appears stated age  Head: Normocephalic, without obvious abnormality, atraumatic  Eyes: EOMI, PERRL   Ears: Hard of hearing, utilizing hearing aide  Neck: supple, symmetrical, trachea midline   Lungs: non labored breathing  Heart: regular heart rate  Right radial puncture site soft non-tender  No hematoma  Bilateral radial pulses 2+, bilateral distal pulses 1+  Neurologic:   Mental status: Alert, oriented x 3  Slow to respond on the right  Some mild difficulty with following some commands     Cranial nerves: grossly intact (Cranial nerves II-XII)  Sensory: normal to LT X 4  Motor: moving all extremities, strength RUE/RLE 4/5, LUE/LLE 4+/5  Coordination: very subtle drift on the right, no drift on the left  Lab Results:  Results from last 7 days   Lab Units 06/03/22  0535 06/02/22  0524 06/01/22  0535 05/31/22  0615 05/30/22  0512   WBC Thousand/uL 5 98 4 59 6 23   < > 7 69   HEMOGLOBIN g/dL 13 0 14 0 13 9   < > 13 1   HEMATOCRIT % 38 4 41 9 40 0   < > 39 2   PLATELETS Thousands/uL 404* 407* 401*   < > 416*   NEUTROS PCT %  --  61 71  --  74   MONOS PCT %  --  13* 9  --  10    < > = values in this interval not displayed  Results from last 7 days   Lab Units 06/03/22  0646 06/02/22  0524 06/01/22  0535 05/29/22  0506 05/28/22  0512 05/27/22  1600   POTASSIUM mmol/L 4 1 4 0 4 5   < > 4 0 4 2   CHLORIDE mmol/L 103 104 103   < > 109* 107   CO2 mmol/L 27 27 25   < > 26 25   BUN mg/dL 14 16 15   < > 14 15   CREATININE mg/dL 0 73 0 65 0 61   < > 0 62 0 78   CALCIUM mg/dL 9 6 9 3 9 4   < > 8 9 9 0   ALK PHOS U/L  --   --   --   --  77 79   ALT U/L  --   --   --   --  29 30   AST U/L  --   --   --   --  22 18    < > = values in this interval not displayed  Results from last 7 days   Lab Units 06/02/22  0524 05/30/22  0512 05/29/22  0506   MAGNESIUM mg/dL 2 4 2 2 2 2     Results from last 7 days   Lab Units 06/02/22  0524 05/30/22  0512 05/28/22  0512   PHOSPHORUS mg/dL 3 5 2 9 3 4     Results from last 7 days   Lab Units 06/02/22  0524 05/29/22  0506 05/28/22  0512 05/27/22  1600   INR  1 02 1 10 1 04 1 06   PTT seconds 31 28  --  30     Imaging Studies: I have personally reviewed pertinent reports and I have personally reviewed pertinent films in PACS    EKG, Pathology, and Other Studies: I have personally reviewed pertinent reports        VTE Pharmacologic Prophylaxis: Reason for no pharmacologic prophylaxis may start pharm dvt ppx this evening or tomorrow as warranted    VTE Mechanical Prophylaxis: sequential compression device    PLEASE NOTE:  This encounter may have been completed utilizing the M- Modal/Fluency Direct Speech Voice Recognition Software  Grammatical errors, random word insertions, pronoun errors and incomplete sentences are occasional consequences of the system due to software limitations, ambient noise and hardware issues  These may be missed by proof reading prior to affixing electronic signature  Any questions or concerns about the content, text or information contained within the body of this dictation should be directly addressed to the advanced practitioner or physician for clarification

## 2022-06-03 NOTE — PLAN OF CARE
Problem: OCCUPATIONAL THERAPY ADULT  Goal: Performs self-care activities at highest level of function for planned discharge setting  See evaluation for individualized goals  Description: Treatment Interventions: ADL retraining, Functional transfer training, UE strengthening/ROM, Endurance training, Visual perceptual retraining, Cognitive reorientation, Patient/family training, Equipment evaluation/education, Neuromuscular reeducation, Fine motor coordination activities, Compensatory technique education, Activityengagement          See flowsheet documentation for full assessment, interventions and recommendations  Outcome: Progressing  Note: Limitation: Decreased ADL status, Decreased UE ROM, Decreased endurance, Decreased sensation, Visual deficit, Decreased fine motor control, Decreased self-care trans, Decreased high-level ADLs  Prognosis: Good  Assessment: pt participated in am ot session and was seen focusing on adls seated in chair  pt required mod asst ub and max asst lb dressing and min and mod asst for ub and lb bathing  pt was mod asst for thorough grooming and max asst to balance during bathing and clothing management in stance  pt demosntrates r ue weakness and slowness during tasks but was able to wash face with r hand  pt required hand over hand to initaite task will follow focusing on goals from ie  pts supportive dtr and wife present towards end of session  Recommendation: Physiatry Consult  OT Discharge Recommendation: Post acute rehabilitation services  OT - OK to Discharge:  Yes     April A SEAN Hernandez

## 2022-06-03 NOTE — ASSESSMENT & PLAN NOTE
· Initial fall in march with small SDH, increased after fall in April and then decreased  Initially managed with serial imaging and exams  On short interval outpatient CT 5/27 found to have new complex mixed density L sided holo hemisphere subdural hematomas, new from prior 5/10  · Sent to the ER for neurosurgical evaluation   · Taken to OR 5/28 for L craniotomy with SD drain placement x 2  · Both drains now removed   · 14 [facility] 6/2 - stable 2 3 cm subdural fluid collection with subdural pneumocephalus, scattered acute blood products, similar persistent mass effect on left cerebral hemisphere, partial effacement of left lateral ventricle, and 0 8 cm rightward midline shift  · Attempted MMA embolization 6/2 but aborted 2/2 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

## 2022-06-03 NOTE — PROGRESS NOTES
[address] [facility]  Progress Note - Mihai Gutierrez 1940, 79 y o  male MRN: [id number]  Unit/Bed#: [facility] 601-01 Encounter: [id number]  Primary Care Provider: Kelle Pratt DO   Date and time admitted to hospital: 5/27/2022  3:04 PM    Urinary retention  Assessment & Plan  · Finley placed for urinary retention  · Voiding trial today, retention protocol   · Flomax 0 4mg  · If urine trial failure, consult Urology    Constipation  Assessment & Plan  · Increased bowel regimen to sennakot S BID, milalax daily, dulcolax suppository daily   · Consider magnesium citrate vs enema if no BM tomorrow     Hypercholesterolemia  Assessment & Plan  · Pravastatin 40mg    * SDH (subdural hematoma) ([facility] [address] )  Assessment & Plan  · Initial fall in march with small SDH, increased after fall in April and then decreased  Initially managed with serial imaging and exams  On short interval outpatient CT 5/27 found to have new complex mixed density L sided holo hemisphere subdural hematomas, new from prior 5/10  · Sent to the ER for neurosurgical evaluation   · Taken to OR 5/28 for L craniotomy with SD drain placement x 2  · Both drains now removed   · [facility] 6/2 - stable 2 3 cm subdural fluid collection with subdural pneumocephalus, scattered acute blood products, similar persistent mass effect on left cerebral hemisphere, partial effacement of left lateral ventricle, and 0 8 cm rightward midline shift  · Attempted MMA embolization 6/2 but aborted 2/2 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     Patient hemodynamically stable and afebrile; patient with hearing amplification and in place  Patient had no focal weakness on exam an moves all extremities  Patient with PT OT assistance from bed to chair  Patient reports normal bowel movements and normal appetite dietary intake  Patient denies chest pain, shortness breath, abdominal pain  Disposition:  Stable and continues to require med/surg care; PT/OT and placement pending    SUBJECTIVE:  Chief Complaint: recent fall    Subjective: "I am feeling better "    OBJECTIVE:   Vitals:   Temp:  [98 °F (36 7 °C)-99 1 °F (37 3 °C)] 98 8 °F (37 1 °C)  HR:  [] 88  Resp:  [14-21] 16  BP: (110-139)/(63-84) 110/69    Intake/Output:  I/O       06/01 0701  06/02 0700 06/02 0701  06/03 0700 06/03 0701  06/04 0700    P  O  1020 510 358    I V  (mL/kg) 520 (6 6) 600 (7 8)     IV Piggyback  250     Total Intake(mL/kg) 1540 (19 6) 1360 (17 7) 358 (4 6)    Urine (mL/kg/hr) 1565 (0 8) 800 (0 4)     Drains 0      Stool  0     Total Output 1565 800     Net -25 +560 +358           Unmeasured Urine Occurrence  1 x     Unmeasured Stool Occurrence  4 x 1 x         Nutrition: Diet Regular; Regular House  GI Proph/Bowel Reg: senna and colase  VTE Prophylaxis:Heparin     Physical Exam:   GENERAL APPEARANCE:  Comfortable  NEURO: no focal deficits  HEENT:  EOM intact, no pain on EOM, oropharynx clear and patent; hearing amplifier headphones in place  CV:  Regular rate rhythm  LUNGS:  Clear to auscultation bilaterally  GI:  Soft nontender, nondistended  :  Voiding  MSK:  Moves all extremities  SKIN: warm and well perfused    Invasive Devices  Report    Peripheral Intravenous Line  Duration           Peripheral IV 06/01/22 Distal;Dorsal (posterior); Right Forearm 2 days    Peripheral IV 06/02/22 Proximal;Right;Ventral (anterior) Forearm 1 day                      Lab Results:   BMP/CMP:   Lab Results   Component Value Date    SODIUM 135 (L) 06/03/2022    K 4 1 06/03/2022     06/03/2022    CO2 27 06/03/2022    BUN 14 06/03/2022    CREATININE 0 73 06/03/2022 CALCIUM 9 6 06/03/2022    EGFR 86 06/03/2022    and CBC:   Lab Results   Component Value Date    WBC 5 98 06/03/2022    HGB 13 0 06/03/2022    HCT 38 4 06/03/2022    MCV 95 06/03/2022     (H) 06/03/2022    MCH 32 2 06/03/2022    MCHC 33 9 06/03/2022    RDW 12 8 06/03/2022    MPV 9 2 06/03/2022     Imaging/EKG Studies: I have personally reviewed pertinent reports     none  Other Studies: none

## 2022-06-03 NOTE — CASE MANAGEMENT
Case Management Discharge Planning Note    Patient name Rafael Jaime  Location [facility] 945-49 MRN [id number]  : 1940 Date 6/3/2022       Current Admission Date: 2022  Current Admission Diagnosis:SDH (subdural hematoma) [facility])   Patient Active Problem List    Diagnosis Date Noted    Constipation 2022    Urinary retention 2022    [facility]    Primary osteoarthritis of left knee 2022    Hygroma 2022    Right hand pain     Carpal tunnel syndrome on right     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 ) 2021    Overweight (BMI 25 0-29 9) 2021    Negative depression screening 2019    Medicare annual wellness visit, subsequent 2019    Hypercholesterolemia 2018    Borderline hypertension 2018    Hearing loss 2009      LOS (days): 7  Geometric Mean LOS (GMLOS) (days): 6 60  Days to GMLOS:-0 3     OBJECTIVE:  Risk of Unplanned Readmission Score: 12 58         Current admission status: Inpatient   Preferred Pharmacy:   [facility] [address] JOCELINE HERRERA [address] [place] [address]  Phone: [phone] Fax: [phone]    Primary Care Provider: Lyn Rahman DO    Primary Insurance: MEDICARE  Secondary Insurance: [address] [place] [address] DETAILS:    Pt clinically accepted to [facility] but CM informed there is no bed available in [place] until Monday  Family in agreement with this bed  Pt will be seen by therapy this weekend

## 2022-06-03 NOTE — ARC ADMISSION
Once available will review updated therapy notes with [facility] physician as able and update CM

## 2022-06-03 NOTE — PLAN OF CARE
Problem: PHYSICAL THERAPY ADULT  Goal: Performs mobility at highest level of function for planned discharge setting  See evaluation for individualized goals  Description: Treatment/Interventions: Functional transfer training, LE strengthening/ROM, Elevations, Therapeutic exercise, Endurance training, Patient/family training, Equipment eval/education, Bed mobility, Gait training, Spoke to nursing, OT  Equipment Recommended: Pamela Carrasco       See flowsheet documentation for full assessment, interventions and recommendations  Outcome: Progressing  Note: Prognosis: Good  Problem List: Decreased strength, Decreased endurance, Decreased mobility, Impaired balance, Decreased cognition, Decreased coordination, Decreased safety awareness, Impaired hearing  Assessment: The pt  initially followed commands to sit at the side of the bed and then stand, but then he did not follow them  He required considerable assistance to ambulate as one person was necessary in order to correct for his retropulsion and balance and another to maximally pull the walker and the patient forward  He had a very short shuffling stride with a near scissoring gait pattern  He fatigued with ambulation, and he noted increased dizziness  An extended rest was provided when his wife and daughter arrived  They were updated with his current progress, and were present during the second ambulatory trial which was much of the same as the first  The patient was able to ambulate a short distance and then noted dizziness again  He was much more interactive once his wife arrived  Of note, the session began while utilizing the pocket talker, but he wanted it removed prior to ambulation  He was in the chair working with occupational therapy post session  EHOB cushion and chair alarm were on the chair    Barriers to Discharge: Inaccessible home environment, Decreased caregiver support        PT Discharge Recommendation: Post acute rehabilitation services          See flowsheet documentation for full assessment

## 2022-06-03 NOTE — RESTORATIVE TECHNICIAN NOTE
Restorative Technician Note      Patient Name: Alexandria Gutierrez     [facility] Tech Visit Date: 6/3/2022  Note Type: Mobility  Patient Position Upon Consult: Bedside chair  Activity Performed: Ambulated  Assistive Device: Roller walker  Patient Position at End of Consult: Bedside chair

## 2022-06-04 LAB
ANION GAP SERPL CALCULATED.3IONS-SCNC: 6 MMOL/L (ref 4–13)
BASOPHILS # BLD AUTO: 0.04 THOUSANDS/ΜL (ref 0–0.1)
BASOPHILS NFR BLD AUTO: 1 % (ref 0–1)
BUN SERPL-MCNC: 16 MG/DL (ref 5–25)
CALCIUM SERPL-MCNC: 8.8 MG/DL (ref 8.3–10.1)
CHLORIDE SERPL-SCNC: 103 MMOL/L (ref 100–108)
CO2 SERPL-SCNC: 27 MMOL/L (ref 21–32)
CREAT SERPL-MCNC: 0.68 MG/DL (ref 0.6–1.3)
EOSINOPHIL # BLD AUTO: 0.16 THOUSAND/ΜL (ref 0–0.61)
EOSINOPHIL NFR BLD AUTO: 3 % (ref 0–6)
ERYTHROCYTE [DISTWIDTH] IN BLOOD BY AUTOMATED COUNT: 12.6 % (ref 11.6–15.1)
GFR SERPL CREATININE-BSD FRML MDRD: 88 ML/MIN/1.73SQ M
GLUCOSE SERPL-MCNC: 111 MG/DL (ref 65–140)
HCT VFR BLD AUTO: 38.7 % (ref 36.5–49.3)
HGB BLD-MCNC: 12.6 G/DL (ref 12–17)
IMM GRANULOCYTES # BLD AUTO: 0.02 THOUSAND/UL (ref 0–0.2)
IMM GRANULOCYTES NFR BLD AUTO: 0 % (ref 0–2)
LYMPHOCYTES # BLD AUTO: 1.22 THOUSANDS/ΜL (ref 0.6–4.47)
LYMPHOCYTES NFR BLD AUTO: 25 % (ref 14–44)
MCH RBC QN AUTO: 31.6 PG (ref 26.8–34.3)
MCHC RBC AUTO-ENTMCNC: 32.6 G/DL (ref 31.4–37.4)
MCV RBC AUTO: 97 FL (ref 82–98)
MONOCYTES # BLD AUTO: 0.67 THOUSAND/ΜL (ref 0.17–1.22)
MONOCYTES NFR BLD AUTO: 14 % (ref 4–12)
NEUTROPHILS # BLD AUTO: 2.87 THOUSANDS/ΜL (ref 1.85–7.62)
NEUTS SEG NFR BLD AUTO: 57 % (ref 43–75)
NRBC BLD AUTO-RTO: 0 /100 WBCS
PLATELET # BLD AUTO: 368 THOUSANDS/UL (ref 149–390)
PMV BLD AUTO: 9.1 FL (ref 8.9–12.7)
POTASSIUM SERPL-SCNC: 4 MMOL/L (ref 3.5–5.3)
RBC # BLD AUTO: 3.99 MILLION/UL (ref 3.88–5.62)
SODIUM SERPL-SCNC: 136 MMOL/L (ref 136–145)
WBC # BLD AUTO: 4.98 THOUSAND/UL (ref 4.31–10.16)

## 2022-06-04 PROCEDURE — 80048 BASIC METABOLIC PNL TOTAL CA: CPT | Performed by: PHYSICIAN ASSISTANT

## 2022-06-04 PROCEDURE — 97116 GAIT TRAINING THERAPY: CPT

## 2022-06-04 PROCEDURE — 99232 SBSQ HOSP IP/OBS MODERATE 35: CPT | Performed by: SURGERY

## 2022-06-04 PROCEDURE — 97530 THERAPEUTIC ACTIVITIES: CPT

## 2022-06-04 PROCEDURE — 85025 COMPLETE CBC W/AUTO DIFF WBC: CPT | Performed by: PHYSICIAN ASSISTANT

## 2022-06-04 RX ADMIN — SENNOSIDES AND DOCUSATE SODIUM 2 TABLET: 50; 8.6 TABLET ORAL at 08:25

## 2022-06-04 RX ADMIN — HEPARIN SODIUM 5000 UNITS: 5000 INJECTION INTRAVENOUS; SUBCUTANEOUS at 05:19

## 2022-06-04 RX ADMIN — Medication 6 MG: at 22:11

## 2022-06-04 RX ADMIN — OXYCODONE HYDROCHLORIDE 5 MG: 5 TABLET ORAL at 08:25

## 2022-06-04 RX ADMIN — HEPARIN SODIUM 5000 UNITS: 5000 INJECTION INTRAVENOUS; SUBCUTANEOUS at 14:58

## 2022-06-04 RX ADMIN — ACETAMINOPHEN 650 MG: 325 TABLET, FILM COATED ORAL at 22:11

## 2022-06-04 RX ADMIN — TAMSULOSIN HYDROCHLORIDE 0.4 MG: 0.4 CAPSULE ORAL at 18:42

## 2022-06-04 RX ADMIN — PRAVASTATIN SODIUM 40 MG: 40 TABLET ORAL at 18:42

## 2022-06-04 RX ADMIN — HEPARIN SODIUM 5000 UNITS: 5000 INJECTION INTRAVENOUS; SUBCUTANEOUS at 22:11

## 2022-06-04 RX ADMIN — ACETAMINOPHEN 650 MG: 325 TABLET, FILM COATED ORAL at 14:58

## 2022-06-04 RX ADMIN — ACETAMINOPHEN 650 MG: 325 TABLET, FILM COATED ORAL at 00:07

## 2022-06-04 RX ADMIN — POLYETHYLENE GLYCOL 3350 17 G: 17 POWDER, FOR SOLUTION ORAL at 08:25

## 2022-06-04 RX ADMIN — SENNOSIDES AND DOCUSATE SODIUM 2 TABLET: 50; 8.6 TABLET ORAL at 18:42

## 2022-06-04 NOTE — ASSESSMENT & PLAN NOTE
· Initial fall in march with small SDH, increased after fall in April and then decreased  Initially managed with serial imaging and exams  On short interval outpatient CT 5/27 found to have new complex mixed density L sided holo hemisphere subdural hematomas, new from prior 5/10  · Sent to the ER for neurosurgical evaluation   · Taken to OR 5/28 for L craniotomy with SD drain placement x 2  · Both drains now removed   · [facility] 6/2 - stable 2 3 cm subdural fluid collection with subdural pneumocephalus, scattered acute blood products, similar persistent mass effect on left cerebral hemisphere, partial effacement of left lateral ventricle, and 0 8 cm rightward midline shift  · Attempted MMA embolization 6/2 but aborted 2/2 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

## 2022-06-04 NOTE — PROGRESS NOTES
At [phone] got patient up from bed to chair by myself due to not having any PCA on the floor and set patient up to eat breakfast  All condiments and drinks were open for the patient as patient has a history of Carpel tunnel  made aware in repost by previous nurse  Nonetheless since the last 6 hospital days it is noted in the chart that patient needs to be set up for meals  Visually witness patient feeing himself upon leaving his room  Patient family at bedside stating that the patient has not eaten breakfast this morning as I reassured them that I did set him up for breakfast  Family expressing to me that he needs to be feed as he cannot do this by himself  Family  Report given to next nurse and placed in chart that patient is now total feed  Family also stating that they have been calling for awhile at 21  was in the room with Branden David, [facility] with patient  At 11:24 called from charge that patient had called, Charge made aware that I was off the floor at [facility]-Scan per charge she will address it  At [address] this is when I got the call with concerns with family

## 2022-06-04 NOTE — PLAN OF CARE
Problem: Potential for Falls  Goal: Patient will remain free of falls  Description: INTERVENTIONS:  - Educate patient/family on patient safety including physical limitations  - Instruct patient to call for assistance with activity   - Consult OT/PT to assist with strengthening/mobility   - Keep Call bell within reach  - Keep bed low and locked with side rails adjusted as appropriate  - Keep care items and personal belongings within reach  - Initiate and maintain comfort rounds  - Make Fall Risk Sign visible to staff  - Apply yellow socks and bracelet for high fall risk patients  - Consider moving patient to room near nurses station  Outcome: Progressing     Problem: MOBILITY - ADULT  Goal: Maintain or return to baseline ADL function  Description: INTERVENTIONS:  -  Assess patient's ability to carry out ADLs; assess patient's baseline for ADL function and identify physical deficits which impact ability to perform ADLs (bathing, care of mouth/teeth, toileting, grooming, dressing, etc )  - Assess/evaluate cause of self-care deficits   - Assess range of motion  - Assess patient's mobility; develop plan if impaired  - Assess patient's need for assistive devices and provide as appropriate  - Encourage maximum independence but intervene and supervise when necessary  - Involve family in performance of ADLs  - Assess for home care needs following discharge   - Consider OT consult to assist with ADL evaluation and planning for discharge  - Provide patient education as appropriate  Outcome: Progressing  Goal: Maintains/Returns to pre admission functional level  Description: INTERVENTIONS:  - Perform BMAT or MOVE assessment daily    - Set and communicate daily mobility goal to care team and patient/family/caregiver     - Collaborate with rehabilitation services on mobility goals if consulted  - Out of bed for toileting  - Record patient progress and toleration of activity level   Outcome: Progressing     Problem: Prexisting or High Potential for Compromised Skin Integrity  Goal: Skin integrity is maintained or improved  Description: INTERVENTIONS:  - Identify patients at risk for skin breakdown  - Assess and monitor skin integrity  - Assess and monitor nutrition and hydration status  - Monitor labs   - Assess for incontinence   - Turn and reposition patient  - Assist with mobility/ambulation  - Relieve pressure over bony prominences  - Avoid friction and shearing  - Provide appropriate hygiene as needed including keeping skin clean and dry  - Evaluate need for skin moisturizer/barrier cream  - Collaborate with interdisciplinary team   - Patient/family teaching  - Consider wound care consult   Outcome: Progressing     Problem: Nutrition/Hydration-ADULT  Goal: Nutrient/Hydration intake appropriate for improving, restoring or maintaining nutritional needs  Description: Monitor and assess patient's nutrition/hydration status for malnutrition  Collaborate with interdisciplinary team and initiate plan and interventions as ordered  Monitor patient's weight and dietary intake as ordered or per policy  Utilize nutrition screening tool and intervene as necessary  Determine patient's food preferences and provide high-protein, high-caloric foods as appropriate       INTERVENTIONS:  - Monitor oral intake, urinary output, labs, and treatment plans  - Assess nutrition and hydration status and recommend course of action  - Evaluate amount of meals eaten  - Assist patient with eating if necessary   - Allow adequate time for meals  - Recommend/ encourage appropriate diets, oral nutritional supplements, and vitamin/mineral supplements  - Order, calculate, and assess calorie counts as needed  - Recommend, monitor, and adjust tube feedings and TPN/PPN based on assessed needs  - Assess need for intravenous fluids  - Provide specific nutrition/hydration education as appropriate  - Include patient/family/caregiver in decisions related to nutrition  Outcome: Progressing     Problem: NEUROSENSORY - ADULT  Goal: Achieves stable or improved neurological status  Description: INTERVENTIONS  - Monitor and report changes in neurological status  - Monitor vital signs such as temperature, blood pressure, glucose, and any other labs ordered   - Initiate measures to prevent increased intracranial pressure  - Monitor for seizure activity and implement precautions if appropriate      Outcome: Progressing  Goal: Achieves maximal functionality and self care  Description: INTERVENTIONS  - Monitor swallowing and airway patency with patient fatigue and changes in neurological status  - Encourage and assist patient to increase activity and self care     - Encourage visually impaired, hearing impaired and aphasic patients to use assistive/communication devices  Outcome: Progressing     Problem: CARDIOVASCULAR - ADULT  Goal: Maintains optimal cardiac output and hemodynamic stability  Description: INTERVENTIONS:  - Monitor I/O, vital signs and rhythm  - Monitor for S/S and trends of decreased cardiac output  - Administer and titrate ordered vasoactive medications to optimize hemodynamic stability  - Assess quality of pulses, skin color and temperature  - Assess for signs of decreased coronary artery perfusion  - Instruct patient to report change in severity of symptoms  Outcome: Progressing  Goal: Absence of cardiac dysrhythmias or at baseline rhythm  Description: INTERVENTIONS:  - Continuous cardiac monitoring, vital signs, obtain 12 lead EKG if ordered  - Administer antiarrhythmic and heart rate control medications as ordered  - Monitor electrolytes and administer replacement therapy as ordered  Outcome: Progressing     Problem: RESPIRATORY - ADULT  Goal: Achieves optimal ventilation and oxygenation  Description: INTERVENTIONS:  - Assess for changes in respiratory status  - Assess for changes in mentation and behavior  - Position to facilitate oxygenation and minimize respiratory effort  - Oxygen administered by appropriate delivery if ordered  - Initiate smoking cessation education as indicated  - Encourage broncho-pulmonary hygiene including cough, deep breathe, Incentive Spirometry  - Assess the need for suctioning and aspirate as needed  - Assess and instruct to report SOB or any respiratory difficulty  - Respiratory Therapy support as indicated  Outcome: Progressing     Problem: GASTROINTESTINAL - ADULT  Goal: Maintains or returns to baseline bowel function  Description: INTERVENTIONS:  - Assess bowel function  - Encourage oral fluids to ensure adequate hydration  - Administer IV fluids if ordered to ensure adequate hydration  - Administer ordered medications as needed  - Encourage mobilization and activity  - Consider nutritional services referral to assist patient with adequate nutrition and appropriate food choices  Outcome: Progressing  Goal: Maintains adequate nutritional intake  Description: INTERVENTIONS:  - Monitor percentage of each meal consumed  - Identify factors contributing to decreased intake, treat as appropriate  - Assist with meals as needed  - Monitor I&O, weight, and lab values if indicated  - Obtain nutrition services referral as needed  Outcome: Progressing     Problem: GENITOURINARY - ADULT  Goal: Maintains or returns to baseline urinary function  Description: INTERVENTIONS:  - Assess urinary function  - Encourage oral fluids to ensure adequate hydration if ordered  - Administer IV fluids as ordered to ensure adequate hydration  - Administer ordered medications as needed  - Offer frequent toileting  - Follow urinary retention protocol if ordered  Outcome: Progressing  Goal: Absence of urinary retention  Description: INTERVENTIONS:  - Assess patients ability to void and empty bladder  - Monitor I/O  - Bladder scan as needed  - Discuss with physician/AP medications to alleviate retention as needed  - Discuss catheterization for long term situations as appropriate  Outcome: Progressing     Problem: METABOLIC, FLUID AND ELECTROLYTES - ADULT  Goal: Electrolytes maintained within normal limits  Description: INTERVENTIONS:  - Monitor labs and assess patient for signs and symptoms of electrolyte imbalances  - Administer electrolyte replacement as ordered  - Monitor response to electrolyte replacements, including repeat lab results as appropriate  - Instruct patient on fluid and nutrition as appropriate  Outcome: Progressing  Goal: Fluid balance maintained  Description: INTERVENTIONS:  - Monitor labs   - Monitor I/O and WT  - Instruct patient on fluid and nutrition as appropriate  - Assess for signs & symptoms of volume excess or deficit  Outcome: Progressing     Problem: SKIN/TISSUE INTEGRITY - ADULT  Goal: Skin Integrity remains intact(Skin Breakdown Prevention)  Description: Assess:  -Assess extremities for adequate circulation and sensation     Bed Management:  -Have minimal linens on bed & keep smooth, unwrinkled  -Change linens as needed when moist or perspiring  -Toileting:  -Offer bedside commode    Activity:  Skin Care:  -Avoid use of baby powder, tape, friction and shearing, hot water or constrictive clothing  --Do not massage red bony areas    Next Steps:  Goal: Incision(s), wounds(s) or drain site(s) healing without S/S of infection  Description: INTERVENTIONS  - Assess and document dressing, incision, wound bed, drain sites and surrounding tissue  - Provide patient and family education  -Outcome: Progressing     Problem: HEMATOLOGIC - ADULT  Goal: Maintains hematologic stability  Description: INTERVENTIONS  - Assess for signs and symptoms of bleeding or hemorrhage  - Monitor labs  - Administer supportive blood products/factors as ordered and appropriate  Outcome: Progressing     Problem: MUSCULOSKELETAL - ADULT  Goal: Maintain or return mobility to safest level of function  Description: INTERVENTIONS:  - Assess patient's ability to carry out ADLs; assess patient's baseline for ADL function and identify physical deficits which impact ability to perform ADLs (bathing, care of mouth/teeth, toileting, grooming, dressing, etc )  - Assess/evaluate cause of self-care deficits   - Assess range of motion  - Assess patient's mobility  - Assess patient's need for assistive devices and provide as appropriate  - Encourage maximum independence but intervene and supervise when necessary  - Involve family in performance of ADLs  - Assess for home care needs following discharge   - Consider OT consult to assist with ADL evaluation and planning for discharge  - Provide patient education as appropriate  Outcome: Progressing     Problem: COPING  Goal: Pt/Family able to verbalize concerns and demonstrate effective coping strategies  Description: INTERVENTIONS:  - Assist patient/family to identify coping skills, available support systems and cultural and spiritual values  - Provide emotional support, including active listening and acknowledgement of concerns of patient and caregivers  - Reduce environmental stimuli, as able  - Provide patient education  - Assess for spiritual pain/suffering and initiate spiritual care, including notification of Pastoral Care or [place] based community as needed  - Assess effectiveness of coping strategies  Outcome: Progressing  Goal: Will report anxiety at manageable levels  Description: INTERVENTIONS:  - Administer medication as ordered  - Teach and encourage coping skills  - Provide emotional support  - Assess patient/family for anxiety and ability to cope  Outcome: Progressing     Problem: PAIN - ADULT  Goal: Verbalizes/displays adequate comfort level or baseline comfort level  Description: Interventions:  - Encourage patient to monitor pain and request assistance  - Assess pain using appropriate pain scale  - Administer analgesics based on type and severity of pain and evaluate response  - Implement non-pharmacological measures as appropriate and evaluate response  - Consider cultural and social influences on pain and pain management  - Notify physician/advanced practitioner if interventions unsuccessful or patient reports new pain  Outcome: Progressing     Problem: INFECTION - ADULT  Goal: Absence or prevention of progression during hospitalization  Description: INTERVENTIONS:  - Assess and monitor for signs and symptoms of infection  - Monitor lab/diagnostic results  - Monitor all insertion sites, i e  indwelling lines, tubes, and drains  - Monitor endotracheal if appropriate and nasal secretions for changes in amount and color  - [place] appropriate cooling/warming therapies per order  - Administer medications as ordered  - Instruct and encourage patient and family to use good hand hygiene technique  - Identify and instruct in appropriate isolation precautions for identified infection/condition  Outcome: Progressing     Problem: SAFETY ADULT  Goal: Patient will remain free of falls  Description: INTERVENTIONS:  - Educate patient/family on patient safety including physical limitations  - Instruct patient to call for assistance with activity   - Consult OT/PT to assist with strengthening/mobility   - Keep Call bell within reach  - Keep bed low and locked with side rails adjusted as appropriate  - Keep care items and personal belongings within reach  - Initiate and maintain comfort rounds  - Make Fall Risk Sign visible to staff  - Consider moving patient to room near nurses station  Outcome: Progressing  Goal: Maintain or return to baseline ADL function  Description: INTERVENTIONS:  -  Assess patient's ability to carry out ADLs; assess patient's baseline for ADL function and identify physical deficits which impact ability to perform ADLs (bathing, care of mouth/teeth, toileting, grooming, dressing, etc )  - Assess/evaluate cause of self-care deficits   - Assess range of motion  - Assess patient's mobility; develop plan if impaired  - Assess patient's need for assistive devices and provide as appropriate  - Encourage maximum independence but intervene and supervise when necessary  - Involve family in performance of ADLs  - Assess for home care needs following discharge   - Consider OT consult to assist with ADL evaluation and planning for discharge  - Provide patient education as appropriate  Outcome: Progressing  Goal: Maintains/Returns to pre admission functional level  Description: INTERVENTIONS:  - Perform BMAT or MOVE assessment daily    - Set and communicate daily mobility goal to care team and patient/family/caregiver  - Collaborate with rehabilitation services on mobility goals if consulted  - Out of bed for toileting  - Record patient progress and toleration of activity level   Outcome: Progressing     Problem: DISCHARGE PLANNING  Goal: Discharge to home or other facility with appropriate resources  Description: INTERVENTIONS:  - Identify barriers to discharge w/patient and caregiver  - Arrange for needed discharge resources and transportation as appropriate  - Identify discharge learning needs (meds, wound care, etc )  - Arrange for interpretive services to assist at discharge as needed  - Refer to Case Management Department for coordinating discharge planning if the patient needs post-hospital services based on physician/advanced practitioner order or complex needs related to functional status, cognitive ability, or social support system  Outcome: Progressing     Problem: Knowledge Deficit  Goal: Patient/family/caregiver demonstrates understanding of disease process, treatment plan, medications, and discharge instructions  Description: Complete learning assessment and assess knowledge base    Interventions:  - Provide teaching at level of understanding  - Provide teaching via preferred learning methods  Outcome: Progressing

## 2022-06-04 NOTE — PHYSICAL THERAPY NOTE
PHYSICAL THERAPY Treatment NOTE    Patient Name: Lowell Schrader  SEKCV'C Date: 6/4/2022 06/04/22 1054   PT Last Visit   PT Visit Date 06/04/22   Note Type   Note Type Treatment   End of Consult   Patient Position at End of Consult Bedside chair; All needs within reach   Pain Assessment   Pain Assessment Tool 0-10   Pain Score 6   Pain Location/Orientation Location: Head   Restrictions/Precautions   Weight Bearing Precautions Per Order No   Other Precautions Hard of hearing; Fall Risk   General   Chart Reviewed Yes   Additional Pertinent History Pt  is an 80 yo M who presents with Subdural Hematoma, now POD 7 s/p craniotomy  Family/Caregiver Present No   Cognition   Overall Cognitive Status Impaired   Arousal/Participation Cooperative   Attention Attends with cues to redirect   Orientation Level Oriented X4   Memory Decreased recall of recent events   Following Commands Follows one step commands with increased time or repetition   Comments Pt  was identified with full name and birthdate   Subjective   Subjective Pt  agreeable to PT session   Bed Mobility   Additional Comments Pt  seated OOB in recliner prior to and following PT session   Transfers   Sit to Stand 3  Moderate assistance   Additional items Assist x 1; Increased time required   Stand to Sit 3  Moderate assistance   Additional items Assist x 1; Increased time required   Toilet transfer 3  Moderate assistance   Additional items Assist x 1;Commode; Increased time required   Additional Comments Verbal cues for proper body mechanics, foot placement and sequencing for ease of transition to standing  Ambulation/Elevation   Gait pattern Narrow ILIANA; Forward Flexion;Decreased foot clearance;Shuffling; Inconsistent savana; Redundant gait at times; Short stride; Excessively slow; Step to   Gait Assistance 3  Moderate assist   Additional items Assist x 1  (And 2nd for WC follow) Assistive Device Rolling walker   Distance 5'x1, 30'x1, requiring seated rest break due to c/o dizziness   Balance   Static Sitting Fair   Dynamic Sitting Fair -   Static Standing Poor +   Dynamic Standing Poor   Ambulatory Poor   Endurance Deficit   Endurance Deficit Yes   Endurance Deficit Description Postural and gait degradation noted with fatigue, weakness, and complaints of dizziness   Activity Tolerance   Activity Tolerance Patient tolerated treatment well   Medical Staff Made Aware Daryl Nichols present   Nurse Made Aware Spoke to RN, cleared for PT session   Assessment   Prognosis Good   Problem List Decreased strength;Decreased range of motion; Impaired balance;Decreased endurance;Decreased mobility; Decreased coordination;Decreased safety awareness;Pain   Assessment Pt  is an 80 yo M who presents with Subdural Hematoma, now POD 7 s/p craniotomy  Pt  Agreeable to PT session, Pt  Identified with full name and birthdate  Pt  Demonstrated increased functional independence and increased activity tolerance as evidenced above  Pt  Tolerates increased gait distance per trial and demonstrates improved functional independence with transfers and ambulation required decreased physical assistance to complete tasks  Pt  Is progressing towards goals, as expected and will benefit from continued skilled therapy to increase functional independence and aid patient in return to OF with decreased burden of care  D/C recommendation is rehab when medically appropriate  Goals   Patient Goals to use the bathroom   STG Expiration Date 06/08/22   PT Treatment Day 3   Plan   Treatment/Interventions Functional transfer training;LE strengthening/ROM; Therapeutic exercise; Endurance training;Patient/family training;Equipment eval/education; Bed mobility;Gait training;Cognitive reorientation;Spoke to case management   Progress Progressing toward goals   PT Frequency   (3-6x/wk)   Recommendation   PT Discharge Recommendation Post acute rehabilitation services   Equipment Recommended [facility] Recommended Wheeled walker   AM-PAC Basic Mobility Inpatient   Turning in Bed Without Bedrails 3   Lying on Back to Sitting on Edge of Flat Bed 3   Moving Bed to Chair 2   Standing Up From Chair 3   Walk in Room 2   Climb 3-5 Stairs 2   Basic Mobility Inpatient Raw Score 15   Basic Mobility Standardized Score 36 97   Highest Level Of Mobility   JH-HLM Goal 4: Move to chair/commode   JH-HLM Achieved 7: Walk 25 feet or more   Education   Education Provided Mobility training;Assistive device   Patient Demonstrates acceptance/verbal understanding;Demonstrates verbal understanding   End of Consult   Patient Position at End of Consult All needs within reach; Bedside chair     The patient's AM-PAC Basic Mobility Inpatient Short Form Raw Score is 15  A Raw score of less than or equal to 16 suggests the patient may benefit from discharge to post-acute rehabilitation services  Please also refer to the recommendation of the Physical Therapist for safe discharge planning      Ralf Remy PT, DPT 6/4/2022

## 2022-06-04 NOTE — PLAN OF CARE
Problem: PHYSICAL THERAPY ADULT  Goal: Performs mobility at highest level of function for planned discharge setting  See evaluation for individualized goals  Description: Treatment/Interventions: Functional transfer training, LE strengthening/ROM, Elevations, Therapeutic exercise, Endurance training, Patient/family training, Equipment eval/education, Bed mobility, Gait training, Spoke to nursing, OT  Equipment Recommended: Arnulfo Osborn       See flowsheet documentation for full assessment, interventions and recommendations  Outcome: Progressing  Note: Prognosis: Good  Problem List: Decreased strength, Decreased range of motion, Impaired balance, Decreased endurance, Decreased mobility, Decreased coordination, Decreased safety awareness, Pain  Assessment: Pt  is an 78 yo M who presents with Subdural Hematoma, now POD 7 s/p craniotomy  Pt  Agreeable to PT session, Pt  Identified with full name and birthdate  Pt  Demonstrated increased functional independence and increased activity tolerance as evidenced above  Pt  Tolerates increased gait distance per trial and demonstrates improved functional independence with transfers and ambulation required decreased physical assistance to complete tasks  Pt  Is progressing towards goals, as expected and will benefit from continued skilled therapy to increase functional independence and aid patient in return to PLOF with decreased burden of care  D/C recommendation is rehab when medically appropriate  Barriers to Discharge: Inaccessible home environment, Decreased caregiver support        PT Discharge Recommendation: Post acute rehabilitation services          See flowsheet documentation for full assessment

## 2022-06-04 NOTE — PROGRESS NOTES
[facility]  Progress Note - Gareth Anon 1940, 79 y o  male MRN: [id number]  Unit/Bed#: [facility] 601-01 Encounter: [id number]  Primary Care Provider: Lucie Duncan DO   Date and time admitted to hospital: 5/27/2022  3:04 PM    Urinary retention  Assessment & Plan  · Finley placed for urinary retention  · Voiding trial today, retention protocol   · Flomax 0 4mg  · If urine trial failure, consult Urology    Constipation  Assessment & Plan  · Increased bowel regimen to sennakot S BID, milalax daily, dulcolax suppository daily     Hypercholesterolemia  Assessment & Plan  · Pravastatin 40mg    * SDH (subdural hematoma) ([facility] [address] )  Assessment & Plan  · Initial fall in march with small SDH, increased after fall in April and then decreased  Initially managed with serial imaging and exams  On short interval outpatient CT 5/27 found to have new complex mixed density L sided holo hemisphere subdural hematomas, new from prior 5/10  · Sent to the ER for neurosurgical evaluation   · Taken to OR 5/28 for L craniotomy with SD drain placement x 2  · Both drains now removed   · [facility] 6/2 - stable 2 3 cm subdural fluid collection with subdural pneumocephalus, scattered acute blood products, similar persistent mass effect on left cerebral hemisphere, partial effacement of left lateral ventricle, and 0 8 cm rightward midline shift  · Attempted MMA embolization 6/2 but aborted 2/2 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       TERTIARY TRAUMA SURVEY NOTE    Prophylaxis: Heparin    Disposition: stable and continues to require med/surg treatment    Code status: Level 1 - Full Code    Consultants:  Neurosurgery, critical care, PT/OT, speech      SUBJECTIVE:     Transfer from: [facility]  Mechanism of Injury:Fall    Chief Complaint: fall     HPI/Last 24 hour events:  Patient hemodynamically stable and afebrile, patient nurse reports no acute incidence occurring for patient overnight  Patient eating at time my initial evaluation  Patient with no focal weakness at time my evaluation, patient reports no bowel movements over the past couple days, glycerin suppository added to MiraLax and Senokot regimen  Patient denies pain  Neurosurgery is continuing to follow patient this time and will follow-up with neurosurgery for 2 week in 6 weak p o  Bees with repeat head CT, patient currently maintaining Keppra  Patient denies chest pain, shortness of breath, and abdominal pain      Active medications:           Current Facility-Administered Medications:     acetaminophen (TYLENOL) tablet 650 mg, 650 mg, Oral, Q6H PRN, 650 mg at 06/04/22 0007    bisacodyl (DULCOLAX) rectal suppository 10 mg, 10 mg, Rectal, Daily, 10 mg at 06/02/22 0804    glycerin (adult) rectal suppository 1 suppository, 1 suppository, Rectal, Once    heparin (porcine) subcutaneous injection 5,000 Units, 5,000 Units, Subcutaneous, Q8H [facility], 5,000 Units at 06/04/22 0519    hydrocortisone 1 % cream, , Topical, 4x Daily PRN    melatonin tablet 6 mg, 6 mg, Oral, HS, 6 mg at 06/03/22 2010    ondansetron (ZOFRAN) injection 4 mg, 4 mg, Intravenous, Q6H PRN    oxyCODONE (ROXICODONE) IR tablet 5 mg, 5 mg, Oral, Q4H PRN, 5 mg at 06/04/22 0825 **OR** oxyCODONE (ROXICODONE) IR tablet 2 5 mg, 2 5 mg, Oral, Q4H PRN, 2 5 mg at 06/02/22 0157    polyethylene glycol (MIRALAX) packet 17 g, 17 g, Oral, Daily, 17 g at 06/04/22 0825    pravastatin (PRAVACHOL) tablet 40 mg, 40 mg, Oral, Daily With Dinner, 40 mg at 06/03/22 1544    senna-docusate sodium (SENOKOT S) 8 6-50 mg per tablet 2 tablet, 2 tablet, Oral, BID, 2 tablet at 06/04/22 0825    tamsulosin (FLOMAX) capsule 0 4 mg, 0 4 mg, Oral, Daily With Dinner, 0 4 mg at 06/03/22 1544      OBJECTIVE:     Vitals:   Vitals:    06/04/22 0813   BP: 118/76   Pulse: 82   Resp: 18   Temp: 98 2 °F (36 8 °C)   SpO2: 92%       Physical Exam:   GENERAL APPEARANCE:  Comfortable  NEURO:  No focal weakness, alert and oriented x3  HEENT:  EOM intact, no pain on EOM, oropharynx clear and patent  CV:  Regular rate rhythm  LUNGS:  Clear to auscultation bilaterally  GI:  Soft and nondistended  :  Voiding  MSK: upper and lower extremities 4/5 and neurovascularly intact  SKIN: warm and well perfused      1  Before the illness or injury that brought you to the Emergency, did you need someone to help you on a regular basis? 1=Yes   2  Since the illness or injury that brought you to the Emergency, have you needed more help than usual to take care of yourself? 1=Yes   3  Have you been hospitalized for one or more nights during the past 6 months (excluding a stay in the Emergency Department)? 1=Yes   4  In general, do you see well? 1=No   5  In general, do you have serious problems with your memory? 0=No   6  Do you take more than three different medications everyday? 1=Yes   TOTAL   4     Did you order a geriatric consult if the score was 2 or greater?: yes                I/O:   I/O       06/02 0701 06/03 0700 06/03 0701  06/04 0700 06/04 0701  06/05 0700    P  O  510 358     I V  (mL/kg) 600 (7 8)      IV Piggyback 250      Total Intake(mL/kg) 1360 (17 7) 358 (4 5)     Urine (mL/kg/hr) 800 (0 4) 900 (0 5) 325 (1)    Drains       Stool 0 0     Total Output 800 900 325    Net +560 -542 -325           Unmeasured Urine Occurrence 1 x 2 x     Unmeasured Stool Occurrence 4 x 1 x           Invasive Devices: Invasive Devices  Report    Peripheral Intravenous Line  Duration           Peripheral IV 06/01/22 Distal;Dorsal (posterior); Right Forearm 3 days    Peripheral IV 06/02/22 Proximal;Right;Ventral (anterior) Forearm 2 days                  Imaging:   XR chest 1 view portable    Result Date: 5/29/2022  Impression: Mild bibasilar atelectasis  Workstation performed: [id number]     CT head wo contrast    Result Date: 5/31/2022  Impression: Left subdural collection that is mostly due to pneumocephalus and fluid is overall stable in size following removal of one of 2 subdural drains but small amount of residual subdural blood products mildly increased     Stable mass effect with 1 cm of left-to-right shift Workstation performed: [id number]     CT head wo contrast    Result Date: 5/30/2022  Impression: Interval left craniotomy and evacuation of large left hemispheric subdural hematoma with subdural drains in place  Large pneumocephalus primarily within the collection with residual subdural blood  Interval worsening left-to-right midline shift measuring 10 mm (previously 8 mm), primarily due to large pneumocephalus within the collection  I personally discussed this study with Ilana Minaya on 5/30/2022 at 8:20 AM  Workstation performed: [id number]     CT head wo contrast    Result Date: 5/27/2022  Impression: Increasing acute on chronic subdural hematoma compared to prior studies with  increasing mass effect and midline shift  Workstation performed: [id number]     MRA head wo contrast    Result Date: 5/29/2022  Impression: 1  Patent major vasculature of [place]  No AV malformation or aneurysm  2   Diminished flow related signal in the distal left MCA branches likely secondary to mass effect from subdural collection  3   Interval left craniotomy for subdural hematoma evacuation with pneumocephalus and residual layering subdural fluid/blood  Subdural drain is in place  Persistent mass effect and 9 mm left-to-right midline shift  Workstation performed: [id number]     MRA carotids wo contrast    Result Date: 5/29/2022  Impression: No hemodynamically significant stenosis of cervical carotid and vertebral arteries  Workstation performed: [id number]       Labs:   CBC:   Lab Results   Component Value Date    WBC 4 98 06/04/2022    HGB 12 6 06/04/2022    HCT 38 7 06/04/2022    MCV 97 06/04/2022     06/04/2022    MCH 31 6 06/04/2022    MCHC 32 6 06/04/2022    RDW 12 6 06/04/2022    MPV 9 1 06/04/2022    NRBC 0 06/04/2022     CMP:   Lab Results   Component Value Date     06/04/2022    CO2 27 06/04/2022    BUN 16 06/04/2022    CREATININE 0 68 06/04/2022    CALCIUM 8 8 06/04/2022    EGFR 88 06/04/2022

## 2022-06-05 LAB
ANION GAP SERPL CALCULATED.3IONS-SCNC: 6 MMOL/L (ref 4–13)
BASOPHILS # BLD AUTO: 0.04 THOUSANDS/ΜL (ref 0–0.1)
BASOPHILS NFR BLD AUTO: 1 % (ref 0–1)
BUN SERPL-MCNC: 14 MG/DL (ref 5–25)
CALCIUM SERPL-MCNC: 9.1 MG/DL (ref 8.3–10.1)
CHLORIDE SERPL-SCNC: 102 MMOL/L (ref 100–108)
CO2 SERPL-SCNC: 28 MMOL/L (ref 21–32)
CREAT SERPL-MCNC: 0.68 MG/DL (ref 0.6–1.3)
EOSINOPHIL # BLD AUTO: 0.17 THOUSAND/ΜL (ref 0–0.61)
EOSINOPHIL NFR BLD AUTO: 3 % (ref 0–6)
ERYTHROCYTE [DISTWIDTH] IN BLOOD BY AUTOMATED COUNT: 12.5 % (ref 11.6–15.1)
GFR SERPL CREATININE-BSD FRML MDRD: 88 ML/MIN/1.73SQ M
GLUCOSE SERPL-MCNC: 108 MG/DL (ref 65–140)
HCT VFR BLD AUTO: 39 % (ref 36.5–49.3)
HGB BLD-MCNC: 13.5 G/DL (ref 12–17)
IMM GRANULOCYTES # BLD AUTO: 0.03 THOUSAND/UL (ref 0–0.2)
IMM GRANULOCYTES NFR BLD AUTO: 1 % (ref 0–2)
LYMPHOCYTES # BLD AUTO: 1.26 THOUSANDS/ΜL (ref 0.6–4.47)
LYMPHOCYTES NFR BLD AUTO: 24 % (ref 14–44)
MCH RBC QN AUTO: 32.1 PG (ref 26.8–34.3)
MCHC RBC AUTO-ENTMCNC: 34.6 G/DL (ref 31.4–37.4)
MCV RBC AUTO: 93 FL (ref 82–98)
MONOCYTES # BLD AUTO: 0.65 THOUSAND/ΜL (ref 0.17–1.22)
MONOCYTES NFR BLD AUTO: 12 % (ref 4–12)
NEUTROPHILS # BLD AUTO: 3.11 THOUSANDS/ΜL (ref 1.85–7.62)
NEUTS SEG NFR BLD AUTO: 59 % (ref 43–75)
NRBC BLD AUTO-RTO: 0 /100 WBCS
PLATELET # BLD AUTO: 403 THOUSANDS/UL (ref 149–390)
PMV BLD AUTO: 9.1 FL (ref 8.9–12.7)
POTASSIUM SERPL-SCNC: 3.8 MMOL/L (ref 3.5–5.3)
RBC # BLD AUTO: 4.2 MILLION/UL (ref 3.88–5.62)
SODIUM SERPL-SCNC: 136 MMOL/L (ref 136–145)
WBC # BLD AUTO: 5.26 THOUSAND/UL (ref 4.31–10.16)

## 2022-06-05 PROCEDURE — 97535 SELF CARE MNGMENT TRAINING: CPT

## 2022-06-05 PROCEDURE — 85025 COMPLETE CBC W/AUTO DIFF WBC: CPT | Performed by: PHYSICIAN ASSISTANT

## 2022-06-05 PROCEDURE — 97530 THERAPEUTIC ACTIVITIES: CPT

## 2022-06-05 PROCEDURE — 97116 GAIT TRAINING THERAPY: CPT

## 2022-06-05 PROCEDURE — 80048 BASIC METABOLIC PNL TOTAL CA: CPT | Performed by: PHYSICIAN ASSISTANT

## 2022-06-05 PROCEDURE — 99232 SBSQ HOSP IP/OBS MODERATE 35: CPT | Performed by: PHYSICIAN ASSISTANT

## 2022-06-05 RX ADMIN — HEPARIN SODIUM 5000 UNITS: 5000 INJECTION INTRAVENOUS; SUBCUTANEOUS at 17:04

## 2022-06-05 RX ADMIN — POLYETHYLENE GLYCOL 3350 17 G: 17 POWDER, FOR SOLUTION ORAL at 09:24

## 2022-06-05 RX ADMIN — HEPARIN SODIUM 5000 UNITS: 5000 INJECTION INTRAVENOUS; SUBCUTANEOUS at 06:12

## 2022-06-05 RX ADMIN — Medication 6 MG: at 22:08

## 2022-06-05 RX ADMIN — SENNOSIDES AND DOCUSATE SODIUM 2 TABLET: 50; 8.6 TABLET ORAL at 09:24

## 2022-06-05 RX ADMIN — HEPARIN SODIUM 5000 UNITS: 5000 INJECTION INTRAVENOUS; SUBCUTANEOUS at 22:08

## 2022-06-05 RX ADMIN — SENNOSIDES AND DOCUSATE SODIUM 2 TABLET: 50; 8.6 TABLET ORAL at 17:04

## 2022-06-05 RX ADMIN — TAMSULOSIN HYDROCHLORIDE 0.4 MG: 0.4 CAPSULE ORAL at 17:04

## 2022-06-05 RX ADMIN — ACETAMINOPHEN 650 MG: 325 TABLET, FILM COATED ORAL at 14:14

## 2022-06-05 RX ADMIN — PRAVASTATIN SODIUM 40 MG: 40 TABLET ORAL at 17:04

## 2022-06-05 NOTE — PLAN OF CARE
Problem: PHYSICAL THERAPY ADULT  Goal: Performs mobility at highest level of function for planned discharge setting  See evaluation for individualized goals  Description: Treatment/Interventions: Functional transfer training, LE strengthening/ROM, Elevations, Therapeutic exercise, Endurance training, Patient/family training, Equipment eval/education, Bed mobility, Gait training, Spoke to nursing, OT  Equipment Recommended: Naina Salvador       See flowsheet documentation for full assessment, interventions and recommendations  Outcome: Progressing  Note: Prognosis: Good  Problem List: Decreased strength, Decreased endurance, Impaired balance, Decreased coordination, Decreased cognition, Decreased mobility, Decreased safety awareness  Assessment: Pt seen for session for setup, transfers, gait w/ rest time, repositioning  Pt cooperative, limited by [facility] and some confusion  Needs cues for hand placement w/ transfers, w/ significant posterior LOB w/ all transfers  Cues for safe RW mgmt, but able to manage turns better today  Cues for step length, did provide some physical assist to increase L sided wt shift to clear R LE   continue to recommend rehab at d/c  Barriers to Discharge: [address] home environment, Decreased caregiver support        PT Discharge Recommendation: Post acute rehabilitation services          See flowsheet documentation for full assessment

## 2022-06-05 NOTE — PROGRESS NOTES
[facility]  Progress Note - Toma Arias 1940, 79 y o  male MRN: [id number]  Unit/Bed#: [facility] 601-01 Encounter: [id number]  Primary Care Provider: Laureano Brady DO   Date and time admitted to hospital: 5/27/2022  3:04 PM    Urinary retention  Assessment & Plan  · Patient voiding; 450 mL today  · Continue Flomax 0 4mg      Constipation  Assessment & Plan  · Increased bowel regimen to sennakot S BID, milalax daily, dulcolax suppository daily     Hypercholesterolemia  Assessment & Plan  · Pravastatin 40mg    * SDH (subdural hematoma) ([facility])  Assessment & Plan  · Initial fall in march with small SDH, increased after fall in April and then decreased  Initially managed with serial imaging and exams  On short interval outpatient CT 5/27 found to have new complex mixed density L sided holo hemisphere subdural hematomas, new from prior 5/10  · Sent to the ER for neurosurgical evaluation   · Taken to OR 5/28 for L craniotomy with SD drain placement x 2  · Both drains now removed   · [facility] 6/2 - stable 2 3 cm subdural fluid collection with subdural pneumocephalus, scattered acute blood products, similar persistent mass effect on left cerebral hemisphere, partial effacement of left lateral ventricle, and 0 8 cm rightward midline shift  · Attempted MMA embolization 6/2 but aborted 2/2 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    Disposition:  Stable and pending ARC    SUBJECTIVE:  Chief Complaint: recent fall    Subjective: "I am feeling better "      OBJECTIVE:   Vitals:   Temp:  [98 4 °F (36 9 °C)-98 7 °F (37 1 °C)] 98 4 °F (36 9 °C)  HR: [66-88] 88  Resp:  [16-18] 18  BP: (105-136)/(66-73) 121/73    Intake/Output:  I/O       06/03 0701  06/04 0700 06/04 0701  06/05 0700 06/05 0701  06/06 0700    P  O  358  298    I V  (mL/kg)       IV Piggyback       Total Intake(mL/kg) 358 (4 5)  298 (3 8)    Urine (mL/kg/hr) 900 (0 5) 425 (0 2) 450 (0 8)    Stool 0  0    Total Output 900 425 450    Net -542 -425 -152           Unmeasured Urine Occurrence 2 x 1 x     Unmeasured Stool Occurrence 1 x  1 x         Nutrition: Diet Regular; Regular House  GI Proph/Bowel Reg: senokot, miralax  VTE Prophylaxis:Heparin       Patient hemodynamically stable and afebrile today  Patient continues to improve his verbal capacity and with nursing staff denies acute issues for patient overnight  Patient reports normal bowel and bladder evacuation  Patient eating breakfast at time of my evaluation with no difficulty  Patient denies pain and is currently awaiting ARC placement  Patient family members at bedside during re-evaluation in afternoon, with patient improving verbal and motor capacities and functionality  Patient denies chest pain, shortness of breath, and abdominal pain        Physical Exam:   GENERAL APPEARANCE:  Comfortable  NEURO:  Alert and oriented x3, no focal weakness, no focal deficits  HEENT:  EOM intact, no pain on EOM, oropharynx clear and patent  CV:  Regular rate rhythm  LUNGS:  Clear to auscultation, bilaterally  GI:  Soft nontender, nondistended  :  Voiding  MSK:  Upper and lower extremities 4/5 bilaterally neurovascularly intact  SKIN:  Well perfused and warm    Invasive Devices  Report    Peripheral Intravenous Line  Duration           Peripheral IV 06/02/22 Proximal;Right;Ventral (anterior) Forearm 3 days                      Lab Results:   BMP/CMP:   Lab Results   Component Value Date    SODIUM 136 06/05/2022    K 3 8 06/05/2022     06/05/2022    CO2 28 06/05/2022    BUN 14 06/05/2022    CREATININE 0 68 06/05/2022    CALCIUM 9 1 06/05/2022 EGFR 88 06/05/2022    and CBC:   Lab Results   Component Value Date    WBC 5 26 06/05/2022    HGB 13 5 06/05/2022    HCT 39 0 06/05/2022    MCV 93 06/05/2022     (H) 06/05/2022    MCH 32 1 06/05/2022    MCHC 34 6 06/05/2022    RDW 12 5 06/05/2022    MPV 9 1 06/05/2022    NRBC 0 06/05/2022     Imaging/EKG Studies: I have personally reviewed pertinent reports     none  Other Studies: none

## 2022-06-05 NOTE — PHYSICAL THERAPY NOTE
Physical Therapy Treatment Note       06/05/22 1501   PT Last Visit   PT Visit Date 06/05/22   Note Type   Note Type Treatment   Pain Assessment   Pain Assessment Tool 0-10   Pain Score No Pain   Restrictions/Precautions   Weight Bearing Precautions Per Order No   Other Precautions Fall Risk;Multiple lines;Cognitive; Chair Alarm; Bed Alarm;Hard of hearing;Telemetry   General   Chart Reviewed Yes   Family/Caregiver Present Yes   Cognition   Overall Cognitive Status Impaired   Arousal/Participation Responsive   Attention Attends with cues to redirect   Orientation Level Oriented to person;Oriented to place;Oriented to situation   Memory Unable to assess   Following Commands Follows one step commands without difficulty   Subjective   Subjective cooperative w/ session, c/o some dizziness  "I need to move"   Transfers   Sit to Stand 3  Moderate assistance   Additional items Assist x 1; Increased time required  (initial posterior LOB on 1st standing)   Stand to Sit 3  Moderate assistance   Additional items Assist x 1  (cues for hand placement, sits without reaching back)   Ambulation/Elevation   Gait pattern   (slow, ataxia, short step length R, R foot drag, retropulsion)   Gait Assistance 3  Moderate assist  (at times, decreases to min A)   Additional items Assist x 1   Assistive Device Rolling walker   Distance 25'x1 w/ 1-2 min seated rest, followed by 40'x1 w/ 1-2 min standing rest, followed by 80'x1   cues for step length, RW mgmt, especially w/ turns     Balance   Static Sitting Good   Dynamic Sitting Fair -   Static Standing Poor +   Dynamic Standing Poor   Ambulatory Poor   Endurance Deficit   Endurance Deficit Yes   Endurance Deficit Description fatigue, weakness, dizziness   Activity Tolerance   Activity Tolerance Patient tolerated treatment well;Patient limited by fatigue;Treatment limited secondary to medical complications (Comment)   Nurse Made Aware yes   Assessment   Prognosis Good   Problem List Decreased strength;Decreased endurance; Impaired balance;Decreased coordination;Decreased cognition;Decreased mobility; Decreased safety awareness   Assessment Pt seen for session for setup, transfers, gait w/ rest time, repositioning  Pt cooperative, limited by [facility] and some confusion  Needs cues for hand placement w/ transfers, w/ significant posterior LOB w/ all transfers  Cues for safe RW mgmt, but able to manage turns better today  Cues for step length, did provide some physical assist to increase L sided wt shift to clear R LE   continue to recommend rehab at d/c   Goals   Patient Goals to walk more   STG Expiration Date 06/08/22   PT Treatment Day 4   Plan   Treatment/Interventions Functional transfer training;LE strengthening/ROM; Therapeutic exercise; Endurance training;Patient/family training;Equipment eval/education; Bed mobility;Gait training   Progress Progressing toward goals   PT Frequency 4-6x/wk   Recommendation   PT Discharge Recommendation Post acute rehabilitation services   Equipment Recommended [facility] [facility] Recommended Wheeled walker   Change/add to [employer]?  No   AM-PAC Basic Mobility Inpatient   Turning in Bed Without Bedrails 3   Lying on Back to Sitting on Edge of Flat Bed 3   Moving Bed to Chair 2   Standing Up From Chair 2   Walk in Room 2   Climb 3-5 Stairs 2   Basic Mobility Inpatient Raw Score 14   Basic Mobility Standardized Score 35 55   Highest Level Of Mobility   JH-HLM Goal 4: Move to chair/commode   JH-HLM Achieved 7: Walk 25 feet or more   Lalo Mayers PT, DPT CSRS

## 2022-06-05 NOTE — PLAN OF CARE
Problem: Potential for Falls  Goal: Patient will remain free of falls  Description: INTERVENTIONS:  - Educate patient/family on patient safety including physical limitations  - Instruct patient to call for assistance with activity   - Consult OT/PT to assist with strengthening/mobility   - Keep Call bell within reach  - Keep bed low and locked with side rails adjusted as appropriate  - Keep care items and personal belongings within reach  - Initiate and maintain comfort rounds  - Make Fall Risk Sign visible to staff  - Offer Toileting every  two Hours, in advance of need  - Initiate/Maintain bed alarm  - Obtain necessary fall risk management equipment  - Apply yellow socks and bracelet for high fall risk patients  - Consider moving patient to room near nurses station  Outcome: Progressing       Problem: MOBILITY - ADULT  Goal: Maintain or return to baseline ADL function  Description: INTERVENTIONS:  -  Assess patient's ability to carry out ADLs; assess patient's baseline for ADL function and identify physical deficits which impact ability to perform ADLs (bathing, care of mouth/teeth, toileting, grooming, dressing, etc )  - Assess/evaluate cause of self-care deficits   - Assess range of motion  - Assess patient's mobility; develop plan if impaired  - Assess patient's need for assistive devices and provide as appropriate  - Encourage maximum independence but intervene and supervise when necessary  - Involve family in performance of ADLs  - Assess for home care needs following discharge   - Consider OT consult to assist with ADL evaluation and planning for discharge  - Provide patient education as appropriate  Outcome: Progressing     Problem: Prexisting or High Potential for Compromised Skin Integrity  Goal: Skin integrity is maintained or improved  Description: INTERVENTIONS:  - Identify patients at risk for skin breakdown  - Assess and monitor skin integrity  - Assess and monitor nutrition and hydration status  - Monitor labs   - Assess for incontinence   - Turn and reposition patient  - Assist with mobility/ambulation  - Relieve pressure over bony prominences  - Avoid friction and shearing  - Provide appropriate hygiene as needed including keeping skin clean and dry  - Evaluate need for skin moisturizer/barrier cream  - Collaborate with interdisciplinary team   - Patient/family teaching  - Consider wound care consult   Outcome: Progressing     Problem: Nutrition/Hydration-ADULT  Goal: Nutrient/Hydration intake appropriate for improving, restoring or maintaining nutritional needs  Description: Monitor and assess patient's nutrition/hydration status for malnutrition  Collaborate with interdisciplinary team and initiate plan and interventions as ordered  Monitor patient's weight and dietary intake as ordered or per policy  Utilize nutrition screening tool and intervene as necessary  Determine patient's food preferences and provide high-protein, high-caloric foods as appropriate       INTERVENTIONS:  - Monitor oral intake, urinary output, labs, and treatment plans  - Assess nutrition and hydration status and recommend course of action  - Evaluate amount of meals eaten  - Assist patient with eating if necessary   - Allow adequate time for meals  - Recommend/ encourage appropriate diets, oral nutritional supplements, and vitamin/mineral supplements  - Order, calculate, and assess calorie counts as needed  - Recommend, monitor, and adjust tube feedings and TPN/PPN based on assessed needs  - Assess need for intravenous fluids  - Provide specific nutrition/hydration education as appropriate  - Include patient/family/caregiver in decisions related to nutrition  Outcome: Progressing     Problem: NEUROSENSORY - ADULT  Goal: Achieves stable or improved neurological status  Description: INTERVENTIONS  - Monitor and report changes in neurological status  - Monitor vital signs such as temperature, blood pressure, glucose, and any other labs ordered   - Initiate measures to prevent increased intracranial pressure  - Monitor for seizure activity and implement precautions if appropriate      Outcome: Progressing     Problem: RESPIRATORY - ADULT  Goal: Achieves optimal ventilation and oxygenation  Description: INTERVENTIONS:  - Assess for changes in respiratory status  - Assess for changes in mentation and behavior  - Position to facilitate oxygenation and minimize respiratory effort  - Oxygen administered by appropriate delivery if ordered  - Initiate smoking cessation education as indicated  - Encourage broncho-pulmonary hygiene including cough, deep breathe, Incentive Spirometry  - Assess the need for suctioning and aspirate as needed  - Assess and instruct to report SOB or any respiratory difficulty  - Respiratory Therapy support as indicated  Outcome: Progressing

## 2022-06-05 NOTE — ASSESSMENT & PLAN NOTE
· Initial fall in march with small SDH, increased after fall in April and then decreased  Initially managed with serial imaging and exams  On short interval outpatient CT 5/27 found to have new complex mixed density L sided holo hemisphere subdural hematomas, new from prior 5/10  · Sent to the ER for neurosurgical evaluation   · Taken to OR 5/28 for L craniotomy with SD drain placement x 2  · Both drains now removed   · [facility] 6/2 - stable 2 3 cm subdural fluid collection with subdural pneumocephalus, scattered acute blood products, similar persistent mass effect on left cerebral hemisphere, partial effacement of left lateral ventricle, and 0 8 cm rightward midline shift  · Attempted MMA embolization 6/2 but aborted 2/2 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

## 2022-06-06 ENCOUNTER — TRANSITIONAL CARE MANAGEMENT (OUTPATIENT)
Dept: FAMILY MEDICINE CLINIC | Facility: CLINIC | Age: 82
End: 2022-06-06

## 2022-06-06 ENCOUNTER — HOSPITAL ENCOUNTER (INPATIENT)
Facility: HOSPITAL | Age: 82
LOS: 15 days | Discharge: HOME | DRG: 949 | End: 2022-06-21
Attending: PHYSICAL MEDICINE & REHABILITATION | Admitting: PHYSICAL MEDICINE & REHABILITATION
Payer: MEDICARE

## 2022-06-06 ENCOUNTER — TELEPHONE (OUTPATIENT)
Dept: NEUROSURGERY | Facility: CLINIC | Age: 82
End: 2022-06-06

## 2022-06-06 VITALS
BODY MASS INDEX: 24.5 KG/M2 | WEIGHT: 175 LBS | HEART RATE: 92 BPM | OXYGEN SATURATION: 94 % | SYSTOLIC BLOOD PRESSURE: 131 MMHG | DIASTOLIC BLOOD PRESSURE: 90 MMHG | RESPIRATION RATE: 16 BRPM | TEMPERATURE: 97.5 F | HEIGHT: 71 IN

## 2022-06-06 DIAGNOSIS — S06.5X9A SDH (SUBDURAL HEMATOMA) (HCC): Primary | ICD-10-CM

## 2022-06-06 DIAGNOSIS — K59.00 CONSTIPATION, UNSPECIFIED CONSTIPATION TYPE: ICD-10-CM

## 2022-06-06 DIAGNOSIS — M17.12 PRIMARY OSTEOARTHRITIS OF LEFT KNEE: ICD-10-CM

## 2022-06-06 DIAGNOSIS — E78.00 HYPERCHOLESTEROLEMIA: ICD-10-CM

## 2022-06-06 DIAGNOSIS — I95.9 HYPOTENSION, UNSPECIFIED HYPOTENSION TYPE: ICD-10-CM

## 2022-06-06 LAB
ANION GAP SERPL CALCULATED.3IONS-SCNC: 4 MMOL/L (ref 4–13)
BASOPHILS # BLD AUTO: 0.04 THOUSANDS/ΜL (ref 0–0.1)
BASOPHILS NFR BLD AUTO: 1 % (ref 0–1)
BUN SERPL-MCNC: 15 MG/DL (ref 5–25)
CALCIUM SERPL-MCNC: 9.3 MG/DL (ref 8.3–10.1)
CHLORIDE SERPL-SCNC: 105 MMOL/L (ref 100–108)
CO2 SERPL-SCNC: 29 MMOL/L (ref 21–32)
CREAT SERPL-MCNC: 0.7 MG/DL (ref 0.6–1.3)
EOSINOPHIL # BLD AUTO: 0.13 THOUSAND/ΜL (ref 0–0.61)
EOSINOPHIL NFR BLD AUTO: 3 % (ref 0–6)
ERYTHROCYTE [DISTWIDTH] IN BLOOD BY AUTOMATED COUNT: 12.6 % (ref 11.6–15.1)
FLUAV RNA RESP QL NAA+PROBE: NEGATIVE
FLUBV RNA RESP QL NAA+PROBE: NEGATIVE
GFR SERPL CREATININE-BSD FRML MDRD: 87 ML/MIN/1.73SQ M
GLUCOSE SERPL-MCNC: 93 MG/DL (ref 65–140)
HCT VFR BLD AUTO: 38.6 % (ref 36.5–49.3)
HGB BLD-MCNC: 13.3 G/DL (ref 12–17)
IMM GRANULOCYTES # BLD AUTO: 0.02 THOUSAND/UL (ref 0–0.2)
IMM GRANULOCYTES NFR BLD AUTO: 0 % (ref 0–2)
LYMPHOCYTES # BLD AUTO: 1.52 THOUSANDS/ΜL (ref 0.6–4.47)
LYMPHOCYTES NFR BLD AUTO: 30 % (ref 14–44)
MCH RBC QN AUTO: 31.8 PG (ref 26.8–34.3)
MCHC RBC AUTO-ENTMCNC: 34.5 G/DL (ref 31.4–37.4)
MCV RBC AUTO: 92 FL (ref 82–98)
MONOCYTES # BLD AUTO: 0.57 THOUSAND/ΜL (ref 0.17–1.22)
MONOCYTES NFR BLD AUTO: 11 % (ref 4–12)
NEUTROPHILS # BLD AUTO: 2.88 THOUSANDS/ΜL (ref 1.85–7.62)
NEUTS SEG NFR BLD AUTO: 55 % (ref 43–75)
NRBC BLD AUTO-RTO: 0 /100 WBCS
PLATELET # BLD AUTO: 432 THOUSANDS/UL (ref 149–390)
PMV BLD AUTO: 9.3 FL (ref 8.9–12.7)
POTASSIUM SERPL-SCNC: 4 MMOL/L (ref 3.5–5.3)
RBC # BLD AUTO: 4.18 MILLION/UL (ref 3.88–5.62)
RSV RNA RESP QL NAA+PROBE: NEGATIVE
SARS-COV-2 RNA RESP QL NAA+PROBE: NEGATIVE
SODIUM SERPL-SCNC: 138 MMOL/L (ref 136–145)
WBC # BLD AUTO: 5.16 THOUSAND/UL (ref 4.31–10.16)

## 2022-06-06 PROCEDURE — 97535 SELF CARE MNGMENT TRAINING: CPT

## 2022-06-06 PROCEDURE — 99221 1ST HOSP IP/OBS SF/LOW 40: CPT | Performed by: INTERNAL MEDICINE

## 2022-06-06 PROCEDURE — 0241U HB NFCT DS VIR RESP RNA 4 TRGT: CPT | Performed by: PHYSICIAN ASSISTANT

## 2022-06-06 PROCEDURE — 85025 COMPLETE CBC W/AUTO DIFF WBC: CPT | Performed by: PHYSICIAN ASSISTANT

## 2022-06-06 PROCEDURE — 99238 HOSP IP/OBS DSCHRG MGMT 30/<: CPT | Performed by: EMERGENCY MEDICINE

## 2022-06-06 PROCEDURE — 80048 BASIC METABOLIC PNL TOTAL CA: CPT | Performed by: PHYSICIAN ASSISTANT

## 2022-06-06 PROCEDURE — 99223 1ST HOSP IP/OBS HIGH 75: CPT | Performed by: PHYSICAL MEDICINE & REHABILITATION

## 2022-06-06 PROCEDURE — NC001 PR NO CHARGE

## 2022-06-06 PROCEDURE — 97116 GAIT TRAINING THERAPY: CPT

## 2022-06-06 RX ORDER — OXYCODONE HYDROCHLORIDE 5 MG/1
2.5 TABLET ORAL EVERY 4 HOURS PRN
Status: DISCONTINUED | OUTPATIENT
Start: 2022-06-06 | End: 2022-06-21 | Stop reason: HOSPADM

## 2022-06-06 RX ORDER — POLYETHYLENE GLYCOL 3350 17 G/17G
17 POWDER, FOR SOLUTION ORAL DAILY
Status: CANCELLED | OUTPATIENT
Start: 2022-06-07

## 2022-06-06 RX ORDER — HEPARIN SODIUM 5000 [USP'U]/ML
5000 INJECTION, SOLUTION INTRAVENOUS; SUBCUTANEOUS EVERY 8 HOURS SCHEDULED
Status: CANCELLED | OUTPATIENT
Start: 2022-06-06

## 2022-06-06 RX ORDER — PRAVASTATIN SODIUM 40 MG
40 TABLET ORAL
Status: CANCELLED | OUTPATIENT
Start: 2022-06-06

## 2022-06-06 RX ORDER — TAMSULOSIN HYDROCHLORIDE 0.4 MG/1
0.4 CAPSULE ORAL
Status: DISCONTINUED | OUTPATIENT
Start: 2022-06-06 | End: 2022-06-07

## 2022-06-06 RX ORDER — ONDANSETRON 2 MG/ML
4 INJECTION INTRAMUSCULAR; INTRAVENOUS EVERY 6 HOURS PRN
Status: CANCELLED | OUTPATIENT
Start: 2022-06-06

## 2022-06-06 RX ORDER — OXYCODONE HYDROCHLORIDE 5 MG/1
2.5 TABLET ORAL EVERY 4 HOURS PRN
Status: CANCELLED | OUTPATIENT
Start: 2022-06-06

## 2022-06-06 RX ORDER — ACETAMINOPHEN 325 MG/1
650 TABLET ORAL EVERY 6 HOURS PRN
Status: DISCONTINUED | OUTPATIENT
Start: 2022-06-06 | End: 2022-06-21 | Stop reason: HOSPADM

## 2022-06-06 RX ORDER — PRAVASTATIN SODIUM 40 MG
40 TABLET ORAL
Status: DISCONTINUED | OUTPATIENT
Start: 2022-06-06 | End: 2022-06-21 | Stop reason: HOSPADM

## 2022-06-06 RX ORDER — OXYCODONE HYDROCHLORIDE 5 MG/1
5 TABLET ORAL EVERY 4 HOURS PRN
Status: DISCONTINUED | OUTPATIENT
Start: 2022-06-06 | End: 2022-06-21 | Stop reason: HOSPADM

## 2022-06-06 RX ORDER — AMOXICILLIN 250 MG
2 CAPSULE ORAL 2 TIMES DAILY
Status: CANCELLED | OUTPATIENT
Start: 2022-06-06

## 2022-06-06 RX ORDER — LANOLIN ALCOHOL/MO/W.PET/CERES
6 CREAM (GRAM) TOPICAL
Status: CANCELLED | OUTPATIENT
Start: 2022-06-06

## 2022-06-06 RX ORDER — HEPARIN SODIUM 5000 [USP'U]/ML
5000 INJECTION, SOLUTION INTRAVENOUS; SUBCUTANEOUS EVERY 8 HOURS SCHEDULED
Status: DISCONTINUED | OUTPATIENT
Start: 2022-06-06 | End: 2022-06-21 | Stop reason: HOSPADM

## 2022-06-06 RX ORDER — LANOLIN ALCOHOL/MO/W.PET/CERES
6 CREAM (GRAM) TOPICAL
Status: DISCONTINUED | OUTPATIENT
Start: 2022-06-06 | End: 2022-06-21 | Stop reason: HOSPADM

## 2022-06-06 RX ORDER — DIAPER,BRIEF,INFANT-TODD,DISP
EACH MISCELLANEOUS 4 TIMES DAILY PRN
Status: CANCELLED | OUTPATIENT
Start: 2022-06-06

## 2022-06-06 RX ORDER — OXYCODONE HYDROCHLORIDE 5 MG/1
5 TABLET ORAL EVERY 4 HOURS PRN
Status: CANCELLED | OUTPATIENT
Start: 2022-06-06

## 2022-06-06 RX ORDER — TAMSULOSIN HYDROCHLORIDE 0.4 MG/1
0.4 CAPSULE ORAL
Status: CANCELLED | OUTPATIENT
Start: 2022-06-06

## 2022-06-06 RX ORDER — POLYETHYLENE GLYCOL 3350 17 G/17G
17 POWDER, FOR SOLUTION ORAL DAILY PRN
Status: DISCONTINUED | OUTPATIENT
Start: 2022-06-07 | End: 2022-06-08

## 2022-06-06 RX ORDER — BISACODYL 10 MG
10 SUPPOSITORY, RECTAL RECTAL DAILY
Status: CANCELLED | OUTPATIENT
Start: 2022-06-07

## 2022-06-06 RX ORDER — ACETAMINOPHEN 325 MG/1
650 TABLET ORAL EVERY 6 HOURS PRN
Status: CANCELLED | OUTPATIENT
Start: 2022-06-06

## 2022-06-06 RX ADMIN — ACETAMINOPHEN 650 MG: 325 TABLET, FILM COATED ORAL at 06:57

## 2022-06-06 RX ADMIN — HEPARIN SODIUM 5000 UNITS: 5000 INJECTION INTRAVENOUS; SUBCUTANEOUS at 05:18

## 2022-06-06 RX ADMIN — HEPARIN SODIUM 5000 UNITS: 5000 INJECTION INTRAVENOUS; SUBCUTANEOUS at 21:17

## 2022-06-06 RX ADMIN — Medication 6 MG: at 21:17

## 2022-06-06 RX ADMIN — TAMSULOSIN HYDROCHLORIDE 0.4 MG: 0.4 CAPSULE ORAL at 15:35

## 2022-06-06 RX ADMIN — ACETAMINOPHEN 650 MG: 325 TABLET, FILM COATED ORAL at 00:16

## 2022-06-06 RX ADMIN — PRAVASTATIN SODIUM 40 MG: 40 TABLET ORAL at 15:35

## 2022-06-06 RX ADMIN — HEPARIN SODIUM 5000 UNITS: 5000 INJECTION INTRAVENOUS; SUBCUTANEOUS at 13:33

## 2022-06-06 RX ADMIN — POLYETHYLENE GLYCOL 3350 17 G: 17 POWDER, FOR SOLUTION ORAL at 08:26

## 2022-06-06 RX ADMIN — ACETAMINOPHEN 650 MG: 325 TABLET, FILM COATED ORAL at 18:53

## 2022-06-06 NOTE — ARC ADMISSION
Nurse in patients room busy with care  ARC Liaison met with patient's spouse Elyse Coe on unit in sitting area  Introduced self, explained role, ARC program/services, ARC three locations, transfer process to rehab unit, anticipated rehab length of stay, and rehab program booklet  ARC Rehab folder left with patient, all questions answered

## 2022-06-06 NOTE — TREATMENT PLAN
PHYSICAL MEDICINE AND REHABILITATION Khai Huynh 79 y o  male MRN: [id number]  Unit/Bed#: -01 Encounter: [id number]       PATIENT INFORMATION  ADMISSION DATE: 6/6/22 DANIELLA CATEGORY: TBI   ADMISSION DIAGNOSIS: SDH EXPECTED LOS: 1-2 wks      MEDICAL/FUNCTIONAL PROGNOSIS  Based on my assessment of the patient's medical conditions and current functional status, the prognosis for attaining medical and functional goals or the IRF stay is:  Fair     Medical Goals: pain control     ANTICIPATED DISCHARGE DISPOSITION AND SERVICES  COMMUNITY SETTING: home     ANTICIPATED FOLLOW-UP SERVICE:   Outpatient Therapy Services: PT/OT/ST     DISCIPLINE SPECIFIC PLANS:  Required Disciplines & Services: PT/OT/ST     REQUIRED THERAPY:  Therapy Hours per Day Days per Week Total Days   Physical Therapy 1 5 10   Occupational Therapy 1 5 10   Speech/Language Therapy 1 5 10   NOTE: Additional therapy time(s) may be added as appropriate to meet patient needs and to achieve functional goals        ANTICIPATED FUNCTIONAL OUTCOMES:  ADL:   Patient will maximize level for ADLs with least restrictive device upon completion of the rehab program     Bladder/Bowel: Patient will maximize level for bladder/bowel management with least restrictive device upon completion of the rehab program     Transfers:   Patient will maximize level for transfers with least restrictive device upon completion of the rehab program     Locomotion:   Patient will maximize level for locomotion with least restrictive device upon completion of the rehab program     Cognitive:   Patient will maximize level for cognitive tasks upon completion of the rehab program         DISCHARGE PLANNING NEEDS  Equipment needs: TBD       REHAB ANTICIPATED PARTICIPATION RESTRICTIONS:  None

## 2022-06-06 NOTE — H&P
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    HPI: Patient is a 78 yo male with h/o fall and L SDH which was being monitored by neurosx with serial CTH however patient's recent OP CTH showed expansion of L SDH with increasing midline shift therefore patient underwent L craniotomy for SDH evacuation on 5/28 by Dr Guera Scott  A L MMA embolization was attempted as well however was aborted due to anatomic variation that would not allow for the procedure       ROS: A 10 point review of systems was performed and was negative unless noted below    Subjective: Patient without complaint currently       Assessment & Plan:     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: was being monitored with serial CTH however recent OP CTH showed expansion of L SDH with increased midline shift therefore patient underwent L craniotomy for SDH evacuation on 5/28 by Dr Guera Scott, a L MMA embolization was attempted but ultimately aborted due to anatomic variation that would not allow for procedure, s/p keppra course for ppx, goal SBP<160 per neurosx, holding AP/AC/NSAIDs per neurosx (not on AC/AP agents at home PTA) but was cleared by neurosx in acute care for [facility] for DVT ppx and patient was started on HSQ in acute care on 6/3; OP FU with neurosx scheduled for 6/15 and 7/7 with FU CTH 2-3 days PTA per neurosx protocol ([facility] x 2 already e-prescribed in EMR by neurosx team)    Dyslipidemia: therapeutic substitution for home zocor 20 mg qpm     Urinary retention: per d/w spouse appears to have symptoms of enlarged prostate at home, started on flomax in acute care, checking PVRs     Thrombocytosis: mild at 432 (), likely reactive, IM monitoring     h/o rt caudate lacunar infarct: incidental finding on imaging in acute care, therapeutic substitution for home zocor 20 mg qpm, not on AP/AC at home however not currently a candidate for AP/AC due to 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 per neurosx recs    DVT ppx: HSQ (cleared for HSQ in acute care by neurosx and was started on HSQ in acute care on 6/3)    Incidental findings:    1) abnormalities on echocardiogram including but not limited to grade 1 DD & abnormal septal motion    2) abnormalities on EKG including but not limited to wide QRS/RBBB    3) mild left maxillary and left ethmoid mucosal thickening         Functional History - Prior to Admission:     I PTA     Functional Status Upon Admission to ARC:  Transfers: mod   Mobility: min   ADLs: min-mod      Physical Exam:    Vitals:    06/06/22 1433   BP: 102/65   Pulse: 82   Resp: 18   Temp: 98 2 °F (36 8 °C)   SpO2: 97%           General: alert, no apparent distress, cooperative and comfortable    Eye: Normal external eye, conjunctiva, lids, sclera   Ears: Normal external ears  Nose: Normal external nose, mucus membranes  CARDIAC:  +S1/2  LUNGS:  no abnormal respiratory pattern, no retractions noted, non-labored breathing   ABDOMEN:  soft NT   EXTREMITIES:  no cyanosis or clubbing   NEURO: CN 2-12 grossly intact, no gross dysmetria on F-N testing B/L, lt touch grossly intact, 5/5 throughout on MMT    PSYCH:  mood/affect currently stable     Laboratory:    Results from last 7 days   Lab Units 06/06/22  0508 06/05/22  0449 06/04/22  0426   HEMOGLOBIN g/dL 13 3 13 5 12 6   HEMATOCRIT % 38 6 39 0 38 7   WBC Thousand/uL 5 16 5 26 4 98     Results from last 7 days   Lab Units 06/06/22  0508 06/05/22  0449 06/04/22  0426   BUN mg/dL 15 14 16   SODIUM mmol/L 138 136 136   POTASSIUM mmol/L 4 0 3 8 4 0   CHLORIDE mmol/L 105 102 103   CREATININE mg/dL 0 70 0 68 0 68     Results from last 7 days   Lab Units 06/02/22  0524   PROTIME seconds 13 0   INR  1 02            Past Medical History:   Past Surgical History:   Family History:   Social history:   Past Medical History:   Diagnosis Date    Arthritis     Encounter for general adult medical examination without abnormal findings 3/20/2019    Hyperlipidemia     Past Surgical History:   Procedure Laterality Date    BRAIN HEMATOMA EVACUATION Left 5/28/2022    Procedure: left CRANIOTOMY FOR SUBDURAL HEMATOMA;  Surgeon: Maria E Yun MD;  Location:  MAIN OR;  Service: Neurosurgery    HERNIA REPAIR Right     inguinal    IR CEREBRAL ANGIOGRAPHY / INTERVENTION  6/2/2022    RI REVISE MEDIAN N/CARPAL TUNNEL SURG Right 9/20/2021    Procedure: RELEASE CARPAL TUNNEL;  Surgeon: Gretel Burr MD;  Location: [facility];  Service: Orthopedics     No family history on file  Social History     Socioeconomic History    Marital status: /Civil Union     Spouse name: Not on file    Number of children: Not on file    Years of education: Not on file    Highest education level: Not on file   Occupational History    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[employer]   Tobacco Use    Smoking status: Former Smoker    Smokeless tobacco: Never Used    Tobacco comment: Smoked as a teenager   Vaping Use    Vaping Use: Never used   Substance and Sexual Activity    Alcohol use: Yes     Comment: Rare    Drug use: No    Sexual activity: Not on file   Other Topics Concern    Not on file   Social History Narrative    Not on file     Social Determinants of Health     Financial Resource Strain: Not on file   Food Insecurity: Not on file   Transportation Needs: Not on file   Physical Activity: Not on file   Stress: Not on file   Social Connections: Not on file   Intimate Partner Violence: Not on file   Housing Stability: Not on file          Current Medical Diagnosis Allergies   Patient Active Problem List   Diagnosis    Hypercholesterolemia    Borderline hypertension    Medicare annual wellness visit, subsequent    Negative depression screening    Overweight (BMI 25 0-29  9)    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[facility] [place] [address] )    Right hand pain    Carpal tunnel syndrome on right    Hygroma    Primary osteoarthritis of left knee  SDH (subdural hematoma) (HCC)    Hearing loss    Constipation    Urinary retention    No Known Allergies        Medical Necessity Criteria for ARC Admission: thrombocytosis, urinary retention In addition, the preadmission screen, post-admission physical evaluation, overall plan of care and admissions order demonstrate a reasonable expectation that the following criteria were met at the time of admission to the [facility]  1  The patient requires active and ongoing therapeutic intervention of multiple therapy disciplines (physical therapy, occupational therapy, speech-language pathology, or prosthetics/orthotics), one of which is physical or occupational therapy  2  Patient requires an intensive rehabilitation therapy program, as defined in Chapter 1, section 110 2 2 of the CMS Medicare Policy Manual  This intensive rehabilitation therapy program will consist of at least 3 hours of therapy per day at least 5 days per week or at least 15 hours of intensive rehabilitation therapy within a 7 consecutive day period, beginning with the date of admission to the [facility]  3  The patient is reasonably expected to actively participate in, and benefit significantly from, the intensive rehabilitation therapy program as defined in Chapter 1, section 110 2 2 of the CMS Medicare Policy Manual at this time of admission to the [facility]  He can reasonably be expected to make measurable improvement (that will be of practical value to improve the patients functional capacity or adaptation to impairments) as a result of the rehabilitation treatment, as defined in section 110 3, and such improvement can be expected to be made within the prescribed period of time  As noted in the CMS Medicare Policy Manual, the patient need not be expected to achieve complete independence in the domain of self-care nor be expected to return to his or her prior level of functioning in order to meet this standard    4  The patient must require physician supervision by a rehabilitation physician  As such, a rehabilitation physician will conduct face-to-face visits with the patient at least 3 days per week throughout the patients stay in the [facility] to assess the patient both medically and functionally, as well as to modify the course of treatment as needed to maximize the patients capacity to benefit from the rehabilitation process  5  The patient requires an intensive and coordinated interdisciplinary approach to providing rehabilitation, as defined in Chapter 1, section 110 2 5 of the CMS Medicare Policy Manual  This will be achieved through periodic team conferences, conducted at least once in a 7-day period, and comprising of an interdisciplinary team of medical professionals consisting of: a rehabilitation physician, registered nurse,  and/or , and a licensed/certified therapist from each therapy discipline involved in treating the patient  Changes Since Pre-admission Assessment: None -This patient's participation in rehab continues to be reasonable, necessary and appropriate  CMS Required Post-Admission Physician Evaluation Elements  History and Physical, including medical history, functional history and active comorbidities as in above text  Post-Admission Physician Evaluation:  The patient has the potential to make improvement and is in need of physical, occupational, and/or therapy services  The patient may also need nutritional services  Given the patient's complex medical condition and risk of further medical complications, rehabilitative services cannot be safely provided at a lower level of care, such as a skilled nursing facility  I have reviewed the patient's functional and medical status at the time of the preadmission screening and they are the same as on the day of this admission  I acknowledge that I have personally performed a full physical examination on this patient within 24 hours of admission   The patient and/or family demonstrated understanding the rehabilitation program and the discharge process after we discussed them       Agree in entirety: yes  Minor adaptions: none    Major changes: none     Candida Cervantes MD  Physical Medicine and Rehabilitation

## 2022-06-06 NOTE — TELEPHONE ENCOUNTER
6/9/22- PT IN HOSPITAL    6/8/22- [facility]    6/7/22- [facility]    6/6/22- [facility]  2WK POV 6/15  PT WILL NEED CT HEAD  6WK POV 7/7  PT WILL NEED CT HEAD    ----- Message from Gustavo Iqbal PA-C sent at 6/3/2022  3:58 PM EDT -----  Regarding: POV  Hello,     Pt is s/p left craniotomy for SDH evacuation (DKO, 5/28/22)  Please arrange 2 week POV with AP with CTH and for incision check  6 week POV w/Dr Kinjal Paez with [facility]  Thank you!

## 2022-06-06 NOTE — PLAN OF CARE
Problem: Potential for Falls  Goal: Patient will remain free of falls  Description: INTERVENTIONS:  - Educate patient/family on patient safety including physical limitations  - Instruct patient to call for assistance with activity   - Consult OT/PT to assist with strengthening/mobility   - Keep Call bell within reach  - Keep bed low and locked with side rails adjusted as appropriate  - Keep care items and personal belongings within reach  - Initiate and maintain comfort rounds  - Make Fall Risk Sign visible to staff  - Apply yellow socks and bracelet for high fall risk patients  - Consider moving patient to room near nurses station  Outcome: Progressing     Problem: MOBILITY - ADULT  Goal: Maintain or return to baseline ADL function  Description: INTERVENTIONS:  -  Assess patient's ability to carry out ADLs; assess patient's baseline for ADL function and identify physical deficits which impact ability to perform ADLs (bathing, care of mouth/teeth, toileting, grooming, dressing, etc )  - Assess/evaluate cause of self-care deficits   - Assess range of motion  - Assess patient's mobility; develop plan if impaired  - Assess patient's need for assistive devices and provide as appropriate  - Encourage maximum independence but intervene and supervise when necessary  - Involve family in performance of ADLs  - Assess for home care needs following discharge   - Consider OT consult to assist with ADL evaluation and planning for discharge  - Provide patient education as appropriate  Outcome: Progressing  Goal: Maintains/Returns to pre admission functional level  Description: INTERVENTIONS:  - Perform BMAT or MOVE assessment daily    - Set and communicate daily mobility goal to care team and patient/family/caregiver     - Collaborate with rehabilitation services on mobility goals if consulted  - Out of bed for toileting  - Record patient progress and toleration of activity level   Outcome: Progressing     Problem: Prexisting or High Potential for Compromised Skin Integrity  Goal: Skin integrity is maintained or improved  Description: INTERVENTIONS:  - Identify patients at risk for skin breakdown  - Assess and monitor skin integrity  - Assess and monitor nutrition and hydration status  - Monitor labs   - Assess for incontinence   - Turn and reposition patient  - Assist with mobility/ambulation  - Relieve pressure over bony prominences  - Avoid friction and shearing  - Provide appropriate hygiene as needed including keeping skin clean and dry  - Evaluate need for skin moisturizer/barrier cream  - Collaborate with interdisciplinary team   - Patient/family teaching  - Consider wound care consult   Outcome: Progressing     Problem: Nutrition/Hydration-ADULT  Goal: Nutrient/Hydration intake appropriate for improving, restoring or maintaining nutritional needs  Description: Monitor and assess patient's nutrition/hydration status for malnutrition  Collaborate with interdisciplinary team and initiate plan and interventions as ordered  Monitor patient's weight and dietary intake as ordered or per policy  Utilize nutrition screening tool and intervene as necessary  Determine patient's food preferences and provide high-protein, high-caloric foods as appropriate       INTERVENTIONS:  - Monitor oral intake, urinary output, labs, and treatment plans  - Assess nutrition and hydration status and recommend course of action  - Evaluate amount of meals eaten  - Assist patient with eating if necessary   - Allow adequate time for meals  - Recommend/ encourage appropriate diets, oral nutritional supplements, and vitamin/mineral supplements  - Order, calculate, and assess calorie counts as needed  - Recommend, monitor, and adjust tube feedings and TPN/PPN based on assessed needs  - Assess need for intravenous fluids  - Provide specific nutrition/hydration education as appropriate  - Include patient/family/caregiver in decisions related to nutrition  Outcome: Progressing     Problem: NEUROSENSORY - ADULT  Goal: Achieves stable or improved neurological status  Description: INTERVENTIONS  - Monitor and report changes in neurological status  - Monitor vital signs such as temperature, blood pressure, glucose, and any other labs ordered   - Initiate measures to prevent increased intracranial pressure  - Monitor for seizure activity and implement precautions if appropriate      Outcome: Progressing  Goal: Achieves maximal functionality and self care  Description: INTERVENTIONS  - Monitor swallowing and airway patency with patient fatigue and changes in neurological status  - Encourage and assist patient to increase activity and self care     - Encourage visually impaired, hearing impaired and aphasic patients to use assistive/communication devices  Outcome: Progressing     Problem: CARDIOVASCULAR - ADULT  Goal: Maintains optimal cardiac output and hemodynamic stability  Description: INTERVENTIONS:  - Monitor I/O, vital signs and rhythm  - Monitor for S/S and trends of decreased cardiac output  - Administer and titrate ordered vasoactive medications to optimize hemodynamic stability  - Assess quality of pulses, skin color and temperature  - Assess for signs of decreased coronary artery perfusion  - Instruct patient to report change in severity of symptoms  Outcome: Progressing  Goal: Absence of cardiac dysrhythmias or at baseline rhythm  Description: INTERVENTIONS:  - Continuous cardiac monitoring, vital signs, obtain 12 lead EKG if ordered  - Administer antiarrhythmic and heart rate control medications as ordered  - Monitor electrolytes and administer replacement therapy as ordered  Outcome: Progressing     Problem: RESPIRATORY - ADULT  Goal: Achieves optimal ventilation and oxygenation  Description: INTERVENTIONS:  - Assess for changes in respiratory status  - Assess for changes in mentation and behavior  - Position to facilitate oxygenation and minimize respiratory effort  - Oxygen administered by appropriate delivery if ordered  - Initiate smoking cessation education as indicated  - Encourage broncho-pulmonary hygiene including cough, deep breathe, Incentive Spirometry  - Assess the need for suctioning and aspirate as needed  - Assess and instruct to report SOB or any respiratory difficulty  - Respiratory Therapy support as indicated  Outcome: Progressing     Problem: GASTROINTESTINAL - ADULT  Goal: Maintains or returns to baseline bowel function  Description: INTERVENTIONS:  - Assess bowel function  - Encourage oral fluids to ensure adequate hydration  - Administer IV fluids if ordered to ensure adequate hydration  - Administer ordered medications as needed  - Encourage mobilization and activity  - Consider nutritional services referral to assist patient with adequate nutrition and appropriate food choices  Outcome: Progressing  Goal: Maintains adequate nutritional intake  Description: INTERVENTIONS:  - Monitor percentage of each meal consumed  - Identify factors contributing to decreased intake, treat as appropriate  - Assist with meals as needed  - Monitor I&O, weight, and lab values if indicated  - Obtain nutrition services referral as needed  Outcome: Progressing     Problem: GENITOURINARY - ADULT  Goal: Maintains or returns to baseline urinary function  Description: INTERVENTIONS:  - Assess urinary function  - Encourage oral fluids to ensure adequate hydration if ordered  - Administer IV fluids as ordered to ensure adequate hydration  - Administer ordered medications as needed  - Offer frequent toileting  - Follow urinary retention protocol if ordered  Outcome: Progressing  Goal: Absence of urinary retention  Description: INTERVENTIONS:  - Assess patients ability to void and empty bladder  - Monitor I/O  - Bladder scan as needed  - Discuss with physician/AP medications to alleviate retention as needed  - Discuss catheterization for long term situations as appropriate  Outcome: Progressing     Problem: METABOLIC, FLUID AND ELECTROLYTES - ADULT  Goal: Electrolytes maintained within normal limits  Description: INTERVENTIONS:  - Monitor labs and assess patient for signs and symptoms of electrolyte imbalances  - Administer electrolyte replacement as ordered  - Monitor response to electrolyte replacements, including repeat lab results as appropriate  - Instruct patient on fluid and nutrition as appropriate  Outcome: Progressing  Goal: Fluid balance maintained  Description: INTERVENTIONS:  - Monitor labs   - Monitor I/O and WT  - Instruct patient on fluid and nutrition as appropriate  - Assess for signs & symptoms of volume excess or deficit  Outcome: Progressing     Problem: SKIN/TISSUE INTEGRITY - ADULT  Goal: Skin Integrity remains intact(Skin Breakdown Prevention)  Description: Assess:  -Perform Alejandro assessment   -Clean and moisturize skin   -Inspect skin when repositioning, toileting, and assisting with ADLS  -Assess under medical devices   -Assess extremities for adequate circulation and sensation     Bed Management:  -Have minimal linens on bed & keep smooth, unwrinkled  -Change linens as needed when moist or perspiring  -Avoid sitting or lying in one position for more than  -Keep HOB at 40 degrees     Toileting:  -Offer bedside commode  -Assess for incontinence  -Use incontinent care products after each incontinent episode     Activity:  -Mobilize patient  -Encourage activity and walks on unit  -Encourage or provide ROM exercises   -Turn and reposition patient   -Use appropriate equipment to lift or move patient in bed  -Instruct/ Assist with weight shifting    -Consider limitation of chair time     Skin Care:  -Avoid use of baby powder, tape, friction and shearing, hot water or constrictive clothing  -Relieve pressure over bony prominences   -Do not massage red bony areas    Next Steps:  -Teach patient strategies to minimize risks   -Consider consults to  interdisciplinary teams Outcome: Progressing  Goal: Incision(s), wounds(s) or drain site(s) healing without S/S of infection  Description: INTERVENTIONS  - Assess and document dressing, incision, wound bed, drain sites and surrounding tissue  - Provide patient and family education  - Perform skin care/dressing changes  Outcome: Progressing     Problem: HEMATOLOGIC - ADULT  Goal: Maintains hematologic stability  Description: INTERVENTIONS  - Assess for signs and symptoms of bleeding or hemorrhage  - Monitor labs  - Administer supportive blood products/factors as ordered and appropriate  Outcome: Progressing     Problem: MUSCULOSKELETAL - ADULT  Goal: Maintain or return mobility to safest level of function  Description: INTERVENTIONS:  - Assess patient's ability to carry out ADLs; assess patient's baseline for ADL function and identify physical deficits which impact ability to perform ADLs (bathing, care of mouth/teeth, toileting, grooming, dressing, etc )  - Assess/evaluate cause of self-care deficits   - Assess range of motion  - Assess patient's mobility  - Assess patient's need for assistive devices and provide as appropriate  - Encourage maximum independence but intervene and supervise when necessary  - Involve family in performance of ADLs  - Assess for home care needs following discharge   - Consider OT consult to assist with ADL evaluation and planning for discharge  - Provide patient education as appropriate  Outcome: Progressing     Problem: COPING  Goal: Pt/Family able to verbalize concerns and demonstrate effective coping strategies  Description: INTERVENTIONS:  - Assist patient/family to identify coping skills, available support systems and cultural and spiritual values  - Provide emotional support, including active listening and acknowledgement of concerns of patient and caregivers  - Reduce environmental stimuli, as able  - Provide patient education  - Assess for spiritual pain/suffering and initiate spiritual care, including notification of Pastoral Care or [place] based community as needed  - Assess effectiveness of coping strategies  Outcome: Progressing  Goal: Will report anxiety at manageable levels  Description: INTERVENTIONS:  - Administer medication as ordered  - Teach and encourage coping skills  - Provide emotional support  - Assess patient/family for anxiety and ability to cope  Outcome: Progressing     Problem: PAIN - ADULT  Goal: Verbalizes/displays adequate comfort level or baseline comfort level  Description: Interventions:  - Encourage patient to monitor pain and request assistance  - Assess pain using appropriate pain scale  - Administer analgesics based on type and severity of pain and evaluate response  - Implement non-pharmacological measures as appropriate and evaluate response  - Consider cultural and social influences on pain and pain management  - Notify physician/advanced practitioner if interventions unsuccessful or patient reports new pain  Outcome: Progressing     Problem: INFECTION - ADULT  Goal: Absence or prevention of progression during hospitalization  Description: INTERVENTIONS:  - Assess and monitor for signs and symptoms of infection  - Monitor lab/diagnostic results  - Monitor all insertion sites, i e  indwelling lines, tubes, and drains  - Monitor endotracheal if appropriate and nasal secretions for changes in amount and color  - Roy appropriate cooling/warming therapies per order  - Administer medications as ordered  - Instruct and encourage patient and family to use good hand hygiene technique  - Identify and instruct in appropriate isolation precautions for identified infection/condition  Outcome: Progressing     Problem: SAFETY ADULT  Goal: Patient will remain free of falls  Description: INTERVENTIONS:  - Educate patient/family on patient safety including physical limitations  - Instruct patient to call for assistance with activity   - Consult OT/PT to assist with strengthening/mobility   - Keep Call bell within reach  - Keep bed low and locked with side rails adjusted as appropriate  - Keep care items and personal belongings within reach  - Initiate and maintain comfort rounds  - Make Fall Risk Sign visible to staff  - Apply yellow socks and bracelet for high fall risk patients  - Consider moving patient to room near nurses station  Outcome: Progressing  Goal: Maintain or return to baseline ADL function  Description: INTERVENTIONS:  -  Assess patient's ability to carry out ADLs; assess patient's baseline for ADL function and identify physical deficits which impact ability to perform ADLs (bathing, care of mouth/teeth, toileting, grooming, dressing, etc )  - Assess/evaluate cause of self-care deficits   - Assess range of motion  - Assess patient's mobility; develop plan if impaired  - Assess patient's need for assistive devices and provide as appropriate  - Encourage maximum independence but intervene and supervise when necessary  - Involve family in performance of ADLs  - Assess for home care needs following discharge   - Consider OT consult to assist with ADL evaluation and planning for discharge  - Provide patient education as appropriate  Outcome: Progressing  Goal: Maintains/Returns to pre admission functional level  Description: INTERVENTIONS:  - Perform BMAT or MOVE assessment daily    - Set and communicate daily mobility goal to care team and patient/family/caregiver     - Collaborate with rehabilitation services on mobility goals if consulted  - Out of bed for toileting  - Record patient progress and toleration of activity level   Outcome: Progressing     Problem: DISCHARGE PLANNING  Goal: Discharge to home or other facility with appropriate resources  Description: INTERVENTIONS:  - Identify barriers to discharge w/patient and caregiver  - Arrange for needed discharge resources and transportation as appropriate  - Identify discharge learning needs (meds, wound care, etc )  - Arrange for interpretive services to assist at discharge as needed  - Refer to Case Management Department for coordinating discharge planning if the patient needs post-hospital services based on physician/advanced practitioner order or complex needs related to functional status, cognitive ability, or social support system  Outcome: Progressing     Problem: Knowledge Deficit  Goal: Patient/family/caregiver demonstrates understanding of disease process, treatment plan, medications, and discharge instructions  Description: Complete learning assessment and assess knowledge base    Interventions:  - Provide teaching at level of understanding  - Provide teaching via preferred learning methods  Outcome: Progressing

## 2022-06-06 NOTE — PROGRESS NOTES
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, no embolization performed  Right radial sheath removed, closure device applies         PATIENT INFORMATION  Name: Leanna Lopez Phone #: [phone] (home)   Address: [address] [place] [id number]  YOB: 1940 Age: 79 y o  SS#   Marital Status: /Civil Union  Ethnicity:    Employment Status: retired  Extended Emergency Contact Information  Primary Emergency Contact: [employer] Phone: [phone]  Relation: Daughter  Secondary Emergency Contact: Gina Chase  Address: [place] [address], [address] [place] [address] Phone: [phone]  Mobile Phone: [phone]  Relation: Spouse  Advance Directive: Code: Level 1 - Full Code (no ACP docs)      INSURANCE/COVERAGE:     Primary Payor: MEDICARE / Plan: MEDICARE A AND B / Product Type: Medicare A & B Fee for Service /   Secondary Payer: Lynn Brito   Payer Contact:  Payer Contact:   Contact Phone:  Contact Phone: [phone]     Authorization #:  NA  Coverage Dates: NA  LCD: NA  MEDICARE #: [id number]  Medicare Days: 60/30/60  Medical Record #: [id number]    REFERRAL SOURCE:   Referring provider: Tristen Cole MD  Referring facility: [address]  Room: [facility] 601/[facility] [id number]  PCP: Deanna Anderson DO PCP phone number: [phone]    MEDICAL INFORMATION  HPI:   Leanna Lopez is a 79 y o  male with a history of a recent fall off of a ladder end of March 2022, impacted his head, no LOC  Pt did not go to the ER  On 4/12/2022 patient fell down several stairs when his left knee gave out, hitting his forehead sustaining a laceration  CTH showed a new 1 0 cm thick hypodense subdural fluid collection along left frontoparietal cerebral convexity, 0 4 cm rightward midline shift,  new small scalp hematoma right frontal vertex region  A follow up [facility] on 5/27/2022 showed: complex mixed density left-sided holo hemisphere subdural hematomas - new development  Patient went [facility] as advised  Neuro surgery services were consulted and recommended a craniotomy and a possible middle meningeal artery embolization MMA to help limit formation of membranes in the future  On 5/28/22 patient underwent left craniotomy for subdural hematoma evacuation  Patient had 2 drains postoperatively  On 5/30/22 MRA was done and showed 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  Repeat CTH on 5/30/22 showed pneumocephalus with mildly increased L to R midline shift  Patients right hemiparesis was worse in RUE, RLE unchanged  Patient had urine retention requiring a brooks, he was started on Flomax, needed oxygen support initially and has some expressive aphasia with difficulty following 1-2 step commands  Patient is very hard of hearing  MRA reviewed and showed occlusion of MMA  However, after review with neuroendovascular attending, this may be secondary to pneumocephalus  On 6/2/22 patient went to IR for possible angiography intervention procedure  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, no embolization was performed  Patient is hemodynamically stable at this time  PT/OT therapies were consulted and continue to follow patient at this time  PM&R was consulted and are recommending inpatient Acute Rehab when medically stable  All involved medical disciplines feel/agree patient is medically ready for discharge at this time   Inpatient acute rehabilitation physician was consulted  Upon review of patients case and correspondence with PT/OT therapies and PM&R services, [facility] physician feels Kaylen Gold will benefit and is a good candidate / appropriate for inpatient acute rehab at this time  He has demonstrated the willingness / desire and tolerance to participate in the required 3 hours or more of therapies per day  COVID Vaccines: Jessica Leal- 01/19/2021, 02/17/2021, 11/01/2021  Tested Negative for COVID on 6/6/2022      Past Medical History:   Past Surgical History: Allergies:     Past Medical History:   Diagnosis Date    Arthritis     Encounter for general adult medical examination without abnormal findings 3/20/2019    Hyperlipidemia     Past Surgical History:   Procedure Laterality Date    BRAIN HEMATOMA EVACUATION Left 5/28/2022    Procedure: left CRANIOTOMY FOR SUBDURAL HEMATOMA;  Surgeon: Sasha Workman MD;  Location:  MAIN OR;  Service: Neurosurgery    HERNIA REPAIR Right     inguinal    IR CEREBRAL ANGIOGRAPHY / INTERVENTION  6/2/2022    KS REVISE MEDIAN N/CARPAL TUNNEL SURG Right 9/20/2021    Procedure: RELEASE CARPAL TUNNEL;  Surgeon: Brenda Sidhu MD;  Location: [facility] MAIN OR;  Service: Orthopedics     No Known Allergies      Medical/functional conditions requiring inpatient rehabilitation: Decreased: strength, endurance, coordination and safety awareness  Impaired: cognition and balance  Risk for medical/clinical complications: DVT/PE, Infection, impaired skin integrity, incisional dehiscence, falls, uncontrolled pain, hypotensive episodes, urine retention and seizures      Comorbidities/Surgeries in the last 100 days:   · Acute hypoxic respiratory failure  · Urinary retention with brooks in place  · Hyponatremia   · Hypotension  · Hyperlipidemia   · Arthritis  · Ambulatory dysfunction with falls  · DVT ppx:     CURRENT VITAL SIGNS:   Temp:  [97 5 °F (36 4 °C)-98 3 °F (36 8 °C)] 97 5 °F (36 4 °C)  HR:  [81-95] 92  Resp:  [16-18] 16  BP: ()/(61-90) 131/90   Intake/Output Summary (Last 24 hours) at 6/6/2022 1301  Last data filed at 6/6/2022 0831  Gross per 24 hour   Intake 460 ml   Output 500 ml   Net -40 ml        LABORATORY RESULTS:      Lab Results   Component Value Date    HGB 13 3 06/06/2022    HGB 15 3 05/11/2018    HCT 38 6 06/06/2022    HCT 46 2 05/11/2018    WBC 5 16 06/06/2022    WBC 5 1 05/11/2018     Lab Results   Component Value Date    BUN 15 06/06/2022    BUN 17 05/11/2018     05/11/2018    K 4 0 06/06/2022    K 4 7 05/11/2018     06/06/2022     05/11/2018    CREATININE 0 70 06/06/2022    CREATININE 0 84 05/11/2018     Lab Results   Component Value Date    PROTIME 13 0 06/02/2022    INR 1 02 06/02/2022        DIAGNOSTIC STUDIES:  XR chest 1 view portable    Result Date: 5/29/2022  Impression: Mild bibasilar atelectasis  Workstation performed: [id number]     CT head wo contrast    Result Date: 5/31/2022  Impression: Left subdural collection that is mostly due to pneumocephalus and fluid is overall stable in size following removal of one of 2 subdural drains but small amount of residual subdural blood products mildly increased     Stable mass effect with 1 cm of left-to-right shift Workstation performed: [id number]     CT head wo contrast    Result Date: 5/30/2022  Impression: Interval left craniotomy and evacuation of large left hemispheric subdural hematoma with subdural drains in place  Large pneumocephalus primarily within the collection with residual subdural blood  Interval worsening left-to-right midline shift measuring 10 mm (previously 8 mm), primarily due to large pneumocephalus within the collection  I personally discussed this study with Artie Parham on 5/30/2022 at 8:20 AM  Workstation performed: [id number]     CT head wo contrast    Result Date: 5/27/2022  Impression: Increasing acute on chronic subdural hematoma compared to prior studies with  increasing mass effect and midline shift    Workstation performed: [id number]     MRA head wo contrast    Result Date: 5/29/2022  Impression: 1  Patent major vasculature of [place]  No AV malformation or aneurysm  2   Diminished flow related signal in the distal left MCA branches likely secondary to mass effect from subdural collection  3   Interval left craniotomy for subdural hematoma evacuation with pneumocephalus and residual layering subdural fluid/blood  Subdural drain is in place  Persistent mass effect and 9 mm left-to-right midline shift  Workstation performed: [id number]     MRA carotids wo contrast    Result Date: 5/29/2022  Impression: No hemodynamically significant stenosis of cervical carotid and vertebral arteries   Workstation performed: [id number]       PRECAUTIONS/SPECIAL NEEDS:  Safety Concerns, Pain Management, Hard of Hearing and Fall and Seizure Precautions, Urine Retention,     MEDICATIONS:     Current Facility-Administered Medications:     acetaminophen (TYLENOL) tablet 650 mg, 650 mg, Oral, Q6H PRN, Raul Brand PA-C, 650 mg at 06/06/22 7755    bisacodyl (DULCOLAX) rectal suppository 10 mg, 10 mg, Rectal, Daily, Raul Brand PA-C, 10 mg at 06/02/22 2547    glycerin (adult) rectal suppository 1 suppository, 1 suppository, Rectal, Once, Christina Reno PA-C    heparin (porcine) subcutaneous injection 5,000 Units, 5,000 Units, Subcutaneous, Q8H [address], Dionne Americo Manuel PA-C, 5,000 Units at 06/06/22 0518    hydrocortisone 1 % cream, , Topical, 4x Daily PRN, Raul Brand PA-C    melatonin tablet 6 mg, 6 mg, Oral, HS, Raul Brand PA-C, 6 mg at 06/05/22 2208    ondansetron (ZOFRAN) injection 4 mg, 4 mg, Intravenous, Q6H PRN, Raul Brand PA-C    oxyCODONE (ROXICODONE) IR tablet 5 mg, 5 mg, Oral, Q4H PRN, 5 mg at 06/04/22 0825 **OR** oxyCODONE (ROXICODONE) IR tablet 2 5 mg, 2 5 mg, Oral, Q4H PRN, Raul Brand PA-C, 2 5 mg at 06/02/22 0157    polyethylene glycol (MIRALAX) packet 17 g, 17 g, Oral, Daily, Raul Brand PA-C, 17 g at 06/06/22 0826    pravastatin (PRAVACHOL) tablet 40 mg, 40 mg, Oral, Daily With Morrie Amaro, PA-C, 40 mg at 06/05/22 1704    senna-docusate sodium (SENOKOT S) 8 6-50 mg per tablet 2 tablet, 2 tablet, Oral, BID, Washington Balta, PA-C, 2 tablet at 06/05/22 1704    tamsulosin (FLOMAX) capsule 0 4 mg, 0 4 mg, Oral, Daily With Michaelrilaura Amaro, PA-C, 0 4 mg at 06/05/22 1704    SKIN INTEGRITY:   no rashes, no erythema, no peripheral edema, Incision: L surgical head incision with Exofin, R Wrist Cath Site MACKENZIE    PRIOR LEVEL OF FUNCTION:  He lives in [facility] single family home  Nilsa Payton is  and lives with their spouse  Self Care: Independent, Indoor Mobility: Independent and Cognition: Independent    FALLS IN THE LAST 6 MONTHS: 2    HOME ENVIRONMENT:  The living area: can live on one level  Half bath on main level  There are 1 step to enter the home  The patient will not have 24 hour supervision/physical assistance available upon discharge  PREVIOUS DME:  Equipment in home (previous DME): Single Point Cane    FUNCTIONAL STATUS:  Physical Therapy Occupational Therapy Speech Therapy     06/01/22 0939    PT Last Visit   PT Visit Date 06/01/22   Note Type   Note Type Treatment   Pain Assessment   Pain Assessment Tool FLACC   Pain Rating: FLACC (Rest) - Face 0   Pain Rating: FLACC (Rest) - Legs 0   Pain Rating: FLACC (Rest) - Activity 0   Pain Rating: FLACC (Rest) - Cry 0   Pain Rating: FLACC (Rest) - Consolability 0   Score: FLACC (Rest) 0   Pain Rating: FLACC (Activity) - Face 0   Pain Rating: FLACC (Activity) - Legs 0   Pain Rating: FLACC (Activity) - Activity 0   Pain Rating: FLACC (Activity) - Cry 0   Pain Rating: FLACC (Activity) - Consolability 0   Score: FLACC (Activity) 0   Restrictions/Precautions   Weight Bearing Precautions Per Order No   Other Precautions Chair Alarm; Bed Alarm;Cognitive;Multiple lines;Telemetry; Fall Risk;Hard of hearing  (pocket talker, prefers to read lips) General   Chart Reviewed Yes   Family/Caregiver Present No   Cognition   Overall Cognitive Status Impaired   Arousal/Participation Alert; Cooperative   Attention Attends with cues to redirect   Bed Mobility   Supine to Sit 3  Moderate assistance   Additional items Assist x 1   Additional Comments sitting EOB S level   Transfers   Sit to Stand 3  Moderate assistance   Additional items Assist x 1   Stand to Sit 3  Moderate assistance   Additional items Assist x 1   Ambulation/Elevation   Gait pattern Excessively slow; Foward flexed; Shuffling  (assistance progressing R foot)   Gait Assistance 4  Minimal assist   Additional items Assist x 1   Assistive Device Rolling walker   Distance 25ftx2, 50ftx1   Balance   Static Sitting Fair   Dynamic Sitting Fair -   Static Standing Poor +   Dynamic Standing Poor +   Ambulatory Poor   Endurance Deficit   Endurance Deficit Yes   Activity Tolerance   Activity Tolerance Patient limited by fatigue   Medical Staff Made Aware OT   Nurse Made Aware nurse approved therapy session   Exercises   Balance training  sitting EOB for 8-10 minutes while performing dynamic and static balance with OT   Assessment   Prognosis Good   Problem List Decreased strength;Decreased endurance;Decreased mobility; Impaired balance;Decreased cognition   Assessment Pt presents to therapy today with reduced mobility, poor endurance, [place], gait abnormalities, required rest breaks throughout activity  These impairments limit the patient by requiring increased assistance for mobility and places him at risk of falling  Pt would benefit from continued skilled therapy while in the hospital to improve overall mobility and work towards a safe d/c  Recommend rehab  At end of session patient was left seated with call bell within reach  The patient's AM-PAC Basic Mobility Inpatient Short Form Raw Score is 15   A Raw score of less than or equal to 16 suggests the patient may benefit from discharge to post-acute rehabilitation services  Please also refer to the recommendation of the Physical Therapist for safe discharge planning  PT tolerated session well         06/01/22 0938    OT Last Visit   OT Visit Date 06/01/22   Note Type   Note Type Treatment   Restrictions/Precautions   Weight Bearing Precautions Per Order No   Other Precautions Cognitive; Chair Alarm; Bed Alarm;Multiple lines;Telemetry; Fall Risk;Pain;O2;Hard of hearing  (2L O2; pocket talker)   Lifestyle   Autonomy I adls and mobility w/o ad - i iadls - shares homemaking with spouse   Reciprocal Relationships supportive family - spouse and dtr present   Service to Others runs a [address] - per wife pt's trees have been in the [place] Room of the  [employer]at[employer] active pta   Pain Assessment   Pain Assessment Tool 0-10   Pain Score No Pain   ADL   Grooming Assistance 4  Minimal Assistance   Grooming Deficit Setup;Verbal cueing;Supervision/safety; Increased time to complete;Wash/dry hands; Wash/dry face; Teeth care   Grooming Comments pt completed grooming while seated EOB w/ setup  Pt able to wash face w/ rag w/ L hand after VC for initiation and hand over hand to grab rag from basin  Pt then needed Max VC to initiate brushing teeth  Pt had to be handed toothbrush and toothepaste  Pt able to apply toothepaste to brush w/o cues and initiate brushing w/ brush once placed in hand  Pt terminated task pre maturely but able to gargle and spit w/ S   UB Bathing Assistance 4  Minimal Assistance   UB Bathing Deficit Setup;Verbal cueing;Supervision/safety; Increased time to complete;Right arm;Left arm   UB Bathing Comments Pt bathed B/L UEs while seated EOB, Min A for thoroughness of task  Max VC for initiation   LB Bathing Assistance 3  Moderate Assistance   LB Bathing Deficit Setup;Right upper leg;Left upper leg   LB Bathing Comments Pt bathed upper halves of B/L LEs   Max VC for initation   Bed Mobility   Supine to Sit 3  Moderate assistance   Additional items Assist x 1;HOB elevated; Increased time required;Verbal cues;LE management   Sit to Supine Unable to assess   Additional Comments Pt sat EOB w/ S for sitting balance/trunk control   Transfers   Sit to Stand 3  Moderate assistance   Additional items Assist x 1; Increased time required;Verbal cues   Stand to Sit 3  Moderate assistance   Additional items Assist x 1; Increased time required;Verbal cues   Additional Comments Pt required Mod A for moderate force production into standing and VC for hand placement, progressed to Min A x1 after 2 trials  RW for support in standing   Functional Mobility   Functional Mobility 4  Minimal assistance   Additional Comments Pt ambulated short household distance w/ Min A x1; RW for support   Additional items Rolling walker   Cognition   Overall Cognitive Status Impaired   Arousal/Participation Responsive; Cooperative   Attention Difficulty attending to directions   Orientation Level Oriented to person   Memory Decreased recall of precautions   Following Commands Follows one step commands with increased time or repetition   Comments Pt is cooperative; very hard of hearing  Consistently shaking his head no for more than 80% of questions  Appears somewhat confused, slow to respond; a poor historian at times   Needs redirection to task and Max VC for initiation of functional activities   Activity Tolerance   Activity Tolerance Patient limited by fatigue  (hard of hearing)   Medical Staff Made Aware PT, RN   Assessment   Assessment Patient participated in Skilled OT session 6/1/2022 with interventions consisting of ADL re training with the use of correct body mechnaics, Energy Conservation techniques, deep breathing technique, safety awareness and fall prevention techniques, therapeutic exercise to: increase functional use of BUEs, increase BUE muscle strength ,  therapeutic activities to: increase activity tolerance, increase dynamic sit/ stand balance during functional activity , increase postural control, increase trunk control and increase OOB/ sitting tolerance   Patient agreeable to OT treatment session, upon arrival patient was found supine in bed  In comparison to previous session, patient with improvements in EOB sitting tolerance/balance, endurance, functional mobility and transfers   Patient requiring frequent re direction, verbal cues for safety, verbal cues for correct technique, verbal cues for pacing thru activity steps and frequent rest periods  Patient continues to be functioning below baseline level, occupational performance remains limited secondary to factors listed above and increased risk for falls and injury  From OT standpoint, recommendation at time of d/c would be Short Term Rehab when medically stable  Patient to benefit from continued Occupational Therapy treatment while in the hospital to address deficits as defined above and maximize level of functional independence with ADLs and functional mobility  Today's Date: 6/1/2022     Problem List  Principal Problem:    SDH (subdural hematoma) [facility])     Past Medical History  Medical History        Past Medical History:   Diagnosis Date    Arthritis      Encounter for general adult medical examination without abnormal findings 3/20/2019    Hyperlipidemia           Past Surgical History  Surgical History         Past Surgical History:   Procedure Laterality Date    BRAIN HEMATOMA EVACUATION Left 5/28/2022     Procedure: left CRANIOTOMY FOR SUBDURAL HEMATOMA;  Surgeon: Fransisca Rodney MD;  Location:  MAIN OR;  Service: Neurosurgery    HERNIA REPAIR Right       inguinal    [place] REVISE MEDIAN N/CARPAL TUNNEL SURG Right 9/20/2021     Procedure: RELEASE CARPAL TUNNEL;  Surgeon: Kirsten Paniagua MD;  Location: [facility] MAIN OR;  Service: Orthopedics         Summary  Pt presented with functional appearing oral and pharyngeal stage swallowing skills with materials administered today  No s/s aspiration across all trials  Spoke with pt's family re: his speech/communication  They report a gradual decline in his speech with progression of his dementia and hearing loss but no acute changes  No formal speech/language evaluation needed at this time  If pt should end up requiring brain surgery, please re consult with any new changes or concerns       Risk/s for Aspiration: mild    Recommended Diet: regular diet and thin liquids   Recommended Form of Meds: whole with liquid   Aspiration precautions and swallowing strategies: upright posture  Other Recommendations: Continue frequent oral care        Current Medical Status per H&P 5/27/22  Brayan gonsales 79 y  o  male who presents following a routine follow-up head CT for history of recent fall with subdural hemorrhage   CT head today showed enlarging acute on chronic subdural hematoma with increasing midline shift   He was subsequently contacted by the neurosurgery team who recommended he attend the emergency department for evaluation for possible evacuation   Currently patient reports no new symptoms   He denies numbness, weakness, headache or dizziness   He is hard of hearing but this is normal for him  [facility] denies any anticoagulant or anti-platelet use      Per progress note 6/1/22  * SDH (subdural hematoma) (HCC)  Social/Education/Vocational Hx:  Pt lives with spouse     Swallow Information   Current Risks for Dysphagia & Aspiration: s/p SDH evacuation, AMS  Current Diet: regular diet and thin liquids   Baseline Diet: regular diet and thin liquids       Baseline Assessment   Behavior/Cognition: alert and decreased attention  Speech/Language Status: able to follow commands inconsistently and limited verbal output  Patient Positioning: upright in bed  Pain Status/Interventions/Response to Interventions: No report of or nonverbal indications of pain         Swallow Mechanism Exam  Facial: symmetrical  Labial: WFL  Lingual: WFL  Velum: symmetrical  Mandible: adequate ROM  Dentition: adequate  Vocal quality:weak   Volitional Cough: weak   Respiratory Status: on 2L O2     Consistencies Assessed and Performance   Consistencies Administered: thin liquids, puree and solid (remigio cracker)    Oral Stage: WFL  Mastication was slow but adequate with the materials administered today  Bolus formation and transfer were functional with no significant oral residue noted  No overt s/s reduced oral control  Pharyngeal Stage: WFL  Swallow Mechanics: Swallowing initiation appeared prompt  Laryngeal rise was palpated and judged to be within functional limits  No coughing, throat clearing, change in vocal quality or respiratory status noted today  Esophageal Concerns: none reported        Summary and Recommendations (see above)     Results Reviewed with: patient, RN and family      Treatment Recommended: No additional f/u needed at this time  Please re consult with any new changes or concerns  CARE SCORES:  Self Care:  Eatin: Independent  Oral hygiene: 05: Setup or clean-up assistance  Toilet hygiene: 04: Supervision or touching  assistance  Shower/bathing self: 03: Partial/moderate assistance  Upper body dressin: Supervision or touching  assistance  Lower body dressin: Partial/moderate assistance  Putting on/taking off footwear: 09: Not applicable  Transfers:  Roll left and right: 09: Not applicable  Sit to lyin: Not applicable  Lying to sitting on side of bed: 09: Not applicable  Sit to stand: 04: Supervision or touching  assistance  Chair/bed to chair transfer: 03: Partial/moderate assistance  Toilet transfer: 03: Partial/moderate assistance  Mobility:  Walk 10 ft: 03: Partial/moderate assistance  Walk 50 ft with two turns: 09: Not applicable  Walk 695NO: 09: Not applicable    CURRENT GAP IN FUNCTION  Prior to Admission: Functional Status: Patient was independent with mobility/ambulation, transfers, ADL's, IADL's      Estimated length of stay: 10 to 14 days    Anticipated Post-Discharge Disposition/Treatment  Disposition: Return to previous home/apartment  Outpatient Services: Physical Therapy (PT) and Occupational Therapy (OT)    BARRIERS TO DISCHARGE  Weakness, Pain, Diminished cognition/Mentation change, Balance Difficulty, Fatigue, Home Accessibility and Equipment Needs    INTERVENTIONS FOR DISCHARGE  Adaptive equipment, Patient/Family/Caregiver Education, [employer], Financial Assistance, Arrange DME needs, Medication Changes Per MD Orders, Therapy exercises and Center of balance support     REQUIRED THERAPY:  Patient will require PT and OT 90 minutes each per day, five days per week to achieve rehab goals  REQUIRED FUNCTIONAL AND MEDICAL MANAGEMENT FOR INPATIENT REHABILITATION:  Skin:  There are no pressure sores currently, Pain Management: Overall pain is well controlled, Deep Vein Thrombosis (DVT) Prophylaxis:  SCD's while in bed  Nursing to provide education/training for medication and disease management, provide bowel/bladder management training  Internal Medicine to monitor and manage medical conditions, PM&R to maximize function and provide medical oversight of rehabilitation program   PT/OT interventions, Speech Therapy evaluation and treatment as clinically indicated  Patient/family education and training and additional consults will be provided as needed  RECOMMENDED LEVEL OF CARE:   Kayce Ball is a 79 y o  male with a history of a recent fall off of a ladder end of March 2022, impacted his head, no LOC  Pt did not go to the ER  On 4/12/2022 patient fell down several stairs when his left knee gave out, hitting his forehead sustaining a laceration  CTH showed a new 1 0 cm thick hypodense subdural fluid collection along left frontoparietal cerebral convexity, 0 4 cm rightward midline shift,  new small scalp hematoma right frontal vertex region   A follow up [address] [place] [address] on 5/27/2022 showed: complex mixed density left-sided holo hemisphere subdural hematomas - new development  Patient went to [facility] as advised  Neuro surgery services were consulted and recommended a craniotomy and a possible middle meningeal artery embolization MMA to help limit formation of membranes in the future  On 5/28/22 patient underwent left craniotomy for subdural hematoma evacuation  Patient had 2 drains postoperatively  On 5/30/22 MRA was done and showed 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  Repeat CTH on 5/30/22 showed pneumocephalus with mildly increased L to R midline shift  Patients right hemiparesis was worse in RUE, RLE unchanged  Patient had urine retention requiring a brooks, he was started on Flomax, needed oxygen support initially and has some expressive aphasia with difficulty following 1-2 step commands  Patient is very hard of hearing  MRA reviewed and showed occlusion of MMA  However, after review with neuroendovascular attending, this may be secondary to pneumocephalus  On 6/2/22 patient went to IR for possible angiography intervention procedure  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, no embolization was performed  Right radial sheath removed, closure device applied  Patient is hemodynamically stable at this time  PT/OT therapies were consulted and it was noted to have significant decline from baseline level  Prior to admission patient was Independent with ADLs and functional mobility without an assistive device  Currently patient patient is a Mod Assist with the use of a RW for gait and transfers and Supervison/Setup for UB ADL's and Min Assist for LB bathing and Mod Assist for LB Dressing  Close medical management and PM&R management is recommended at this time while patient is on the [facility]  Inpatient acute rehab is recommended for patient to maximize overall strength and mobility upon discharge to home with support of family

## 2022-06-06 NOTE — PLAN OF CARE
Problem: Potential for Falls  Goal: Patient will remain free of falls  Description: INTERVENTIONS:  - Educate patient/family on patient safety including physical limitations  - Instruct patient to call for assistance with activity   - Consult OT/PT to assist with strengthening/mobility   - Keep Call bell within reach  - Keep bed low and locked with side rails adjusted as appropriate  - Keep care items and personal belongings within reach  - Initiate and maintain comfort rounds  - Make Fall Risk Sign visible to staff  - Offer Toileting every 2 Hours, in advance of need  - Initiate/Maintain chair alarm  - Obtain necessary fall risk management equipment: chair alarm  - Apply yellow socks and bracelet for high fall risk patients  - Consider moving patient to room near nurses station  6/6/2022 1028 by Robb Vides RN  Outcome: Progressing  6/6/2022 1028 by Robb Vides RN  Outcome: Progressing     Problem: MOBILITY - ADULT  Goal: Maintain or return to baseline ADL function  Description: INTERVENTIONS:  -  Assess patient's ability to carry out ADLs; assess patient's baseline for ADL function and identify physical deficits which impact ability to perform ADLs (bathing, care of mouth/teeth, toileting, grooming, dressing, etc )  - Assess/evaluate cause of self-care deficits   - Assess range of motion  - Assess patient's mobility; develop plan if impaired  - Assess patient's need for assistive devices and provide as appropriate  - Encourage maximum independence but intervene and supervise when necessary  - Involve family in performance of ADLs  - Assess for home care needs following discharge   - Consider OT consult to assist with ADL evaluation and planning for discharge  - Provide patient education as appropriate  6/6/2022 1028 by Robb Vides RN  Outcome: Progressing  6/6/2022 1028 by Robb Vides RN  Outcome: Progressing  Goal: Maintains/Returns to pre admission functional level  Description: INTERVENTIONS:  - Perform BMAT or MOVE assessment daily    - Set and communicate daily mobility goal to care team and patient/family/caregiver  - Collaborate with rehabilitation services on mobility goals if consulted  - Perform Range of Motion 3 times a day  - Reposition patient every 2 hours  - Dangle patient 3 times a day  - Stand patient 3 times a day  - Ambulate patient 3 times a day  - Out of bed to chair 3 times a day   - Out of bed for meals 3 times a day  - Out of bed for toileting  - Record patient progress and toleration of activity level   6/6/2022 1028 by Venus Mcgraw RN  Outcome: Progressing  6/6/2022 1028 by Venus Mcgraw RN  Outcome: Progressing     Problem: Prexisting or High Potential for Compromised Skin Integrity  Goal: Skin integrity is maintained or improved  Description: INTERVENTIONS:  - Identify patients at risk for skin breakdown  - Assess and monitor skin integrity  - Assess and monitor nutrition and hydration status  - Monitor labs   - Assess for incontinence   - Turn and reposition patient  - Assist with mobility/ambulation  - Relieve pressure over bony prominences  - Avoid friction and shearing  - Provide appropriate hygiene as needed including keeping skin clean and dry  - Evaluate need for skin moisturizer/barrier cream  - Collaborate with interdisciplinary team   - Patient/family teaching  - Consider wound care consult   6/6/2022 1028 by Venus Mcgraw RN  Outcome: Progressing  6/6/2022 1028 by Venus Mcgraw RN  Outcome: Progressing     Problem: Nutrition/Hydration-ADULT  Goal: Nutrient/Hydration intake appropriate for improving, restoring or maintaining nutritional needs  Description: Monitor and assess patient's nutrition/hydration status for malnutrition  Collaborate with interdisciplinary team and initiate plan and interventions as ordered  Monitor patient's weight and dietary intake as ordered or per policy  Utilize nutrition screening tool and intervene as necessary   Determine patient's food preferences and provide high-protein, high-caloric foods as appropriate  INTERVENTIONS:  - Monitor oral intake, urinary output, labs, and treatment plans  - Assess nutrition and hydration status and recommend course of action  - Evaluate amount of meals eaten  - Assist patient with eating if necessary   - Allow adequate time for meals  - Recommend/ encourage appropriate diets, oral nutritional supplements, and vitamin/mineral supplements  - Order, calculate, and assess calorie counts as needed  - Recommend, monitor, and adjust tube feedings and TPN/PPN based on assessed needs  - Assess need for intravenous fluids  - Provide specific nutrition/hydration education as appropriate  - Include patient/family/caregiver in decisions related to nutrition  6/6/2022 1028 by Lieutenant Zina RN  Outcome: Progressing  6/6/2022 1028 by Lieutenant Zina RN  Outcome: Progressing     Problem: NEUROSENSORY - ADULT  Goal: Achieves stable or improved neurological status  Description: INTERVENTIONS  - Monitor and report changes in neurological status  - Monitor vital signs such as temperature, blood pressure, glucose, and any other labs ordered   - Initiate measures to prevent increased intracranial pressure  - Monitor for seizure activity and implement precautions if appropriate      6/6/2022 1028 by Lieutenant Zina RN  Outcome: Progressing  6/6/2022 1028 by Lieutenant Zina RN  Outcome: Progressing  Goal: Achieves maximal functionality and self care  Description: INTERVENTIONS  - Monitor swallowing and airway patency with patient fatigue and changes in neurological status  - Encourage and assist patient to increase activity and self care     - Encourage visually impaired, hearing impaired and aphasic patients to use assistive/communication devices  6/6/2022 1028 by Lieutenant Zina RN  Outcome: Progressing  6/6/2022 1028 by Lieutenant Zina RN  Outcome: Progressing     Problem: CARDIOVASCULAR - ADULT  Goal: Maintains optimal cardiac output and hemodynamic stability  Description: INTERVENTIONS:  - Monitor I/O, vital signs and rhythm  - Monitor for S/S and trends of decreased cardiac output  - Administer and titrate ordered vasoactive medications to optimize hemodynamic stability  - Assess quality of pulses, skin color and temperature  - Assess for signs of decreased coronary artery perfusion  - Instruct patient to report change in severity of symptoms  6/6/2022 1028 by Nigel Potts RN  Outcome: Progressing  6/6/2022 1028 by Nigel Potts RN  Outcome: Progressing  Goal: Absence of cardiac dysrhythmias or at baseline rhythm  Description: INTERVENTIONS:  - Continuous cardiac monitoring, vital signs, obtain 12 lead EKG if ordered  - Administer antiarrhythmic and heart rate control medications as ordered  - Monitor electrolytes and administer replacement therapy as ordered  6/6/2022 1028 by Nigel Potts RN  Outcome: Progressing  6/6/2022 1028 by Nigel Potts RN  Outcome: Progressing     Problem: RESPIRATORY - ADULT  Goal: Achieves optimal ventilation and oxygenation  Description: INTERVENTIONS:  - Assess for changes in respiratory status  - Assess for changes in mentation and behavior  - Position to facilitate oxygenation and minimize respiratory effort  - Oxygen administered by appropriate delivery if ordered  - Initiate smoking cessation education as indicated  - Encourage broncho-pulmonary hygiene including cough, deep breathe, Incentive Spirometry  - Assess the need for suctioning and aspirate as needed  - Assess and instruct to report SOB or any respiratory difficulty  - Respiratory Therapy support as indicated  6/6/2022 1028 by Nigel Potts RN  Outcome: Progressing  6/6/2022 1028 by Nigel Potts RN  Outcome: Progressing     Problem: GASTROINTESTINAL - ADULT  Goal: Maintains or returns to baseline bowel function  Description: INTERVENTIONS:  - Assess bowel function  - Encourage oral fluids to ensure adequate hydration  - Administer IV fluids if ordered to ensure adequate hydration  - Administer ordered medications as needed  - Encourage mobilization and activity  - Consider nutritional services referral to assist patient with adequate nutrition and appropriate food choices  6/6/2022 1028 by Lieutenant Zina RN  Outcome: Progressing  6/6/2022 1028 by Lieutenant Zina RN  Outcome: Progressing  Goal: Maintains adequate nutritional intake  Description: INTERVENTIONS:  - Monitor percentage of each meal consumed  - Identify factors contributing to decreased intake, treat as appropriate  - Assist with meals as needed  - Monitor I&O, weight, and lab values if indicated  - Obtain nutrition services referral as needed  6/6/2022 1028 by Lieutenant Zina RN  Outcome: Progressing  6/6/2022 1028 by Lieutenant Zina RN  Outcome: Progressing     Problem: GENITOURINARY - ADULT  Goal: Maintains or returns to baseline urinary function  Description: INTERVENTIONS:  - Assess urinary function  - Encourage oral fluids to ensure adequate hydration if ordered  - Administer IV fluids as ordered to ensure adequate hydration  - Administer ordered medications as needed  - Offer frequent toileting  - Follow urinary retention protocol if ordered  6/6/2022 1028 by Lieutenant Zina RN  Outcome: Progressing  6/6/2022 1028 by Lieutenant Zina RN  Outcome: Progressing  Goal: Absence of urinary retention  Description: INTERVENTIONS:  - Assess patients ability to void and empty bladder  - Monitor I/O  - Bladder scan as needed  - Discuss with physician/AP medications to alleviate retention as needed  - Discuss catheterization for long term situations as appropriate  6/6/2022 1028 by Lieutenant Zina RN  Outcome: Progressing  6/6/2022 1028 by Lieutenant Zina RN  Outcome: Progressing     Problem: METABOLIC, FLUID AND ELECTROLYTES - ADULT  Goal: Electrolytes maintained within normal limits  Description: INTERVENTIONS:  - Monitor labs and assess patient for signs and symptoms of electrolyte imbalances  - Administer electrolyte replacement as ordered  - Monitor response to electrolyte replacements, including repeat lab results as appropriate  - Instruct patient on fluid and nutrition as appropriate  6/6/2022 1028 by Bushra Sosa RN  Outcome: Progressing  6/6/2022 1028 by Bushra Sosa RN  Outcome: Progressing  Goal: Fluid balance maintained  Description: INTERVENTIONS:  - Monitor labs   - Monitor I/O and WT  - Instruct patient on fluid and nutrition as appropriate  - Assess for signs & symptoms of volume excess or deficit  6/6/2022 1028 by Bushra Sosa RN  Outcome: Progressing  6/6/2022 1028 by Bushra Sosa RN  Outcome: Progressing     Problem: SKIN/TISSUE INTEGRITY - ADULT  Goal: Skin Integrity remains intact(Skin Breakdown Prevention)  Description: Assess:  -Perform Alejandro assessment every day  -Clean and moisturize skin every day  -Inspect skin when repositioning, toileting, and assisting with ADLS  -Assess under medical devices   -Assess extremities for adequate circulation and sensation     Bed Management:  -Have minimal linens on bed & keep smooth, unwrinkled  -Change linens as needed when moist or perspiring  -Avoid sitting or lying in one position for more than 2 hours while in bed  -Keep HOB at 30 degrees     Toileting:  -Offer bedside commode  -Assess for incontinence   -Use incontinent care products after each incontinent episode s    Activity:  -Mobilize patient 3 times a day  -Encourage activity and walks on unit  -Encourage or provide ROM exercises   -Turn and reposition patient every 2 Hours  -Use appropriate equipment to lift or move patient in bed  -Instruct/ Assist with weight shifting every 2 when out of bed in chair  -Consider limitation of chair time 2 hour intervals    Skin Care:  -Avoid use of baby powder, tape, friction and shearing, hot water or constrictive clothing  -Relieve pressure over bony prominences  -Do not massage red bony areas    Next Steps:  -Teach patient strategies to minimize risks   -Consider consults to  interdisciplinary teams  6/6/2022 1028 by Chemo Leon RN  Outcome: Progressing  6/6/2022 1028 by Chemo Leon RN  Outcome: Progressing  Goal: Incision(s), wounds(s) or drain site(s) healing without S/S of infection  Description: INTERVENTIONS  - Assess and document dressing, incision, wound bed, drain sites and surrounding tissue  - Provide patient and family education  - Perform skin care/dressing changes   6/6/2022 1028 by Chemo Leon RN  Outcome: Progressing  6/6/2022 1028 by Chemo Leon RN  Outcome: Progressing     Problem: HEMATOLOGIC - ADULT  Goal: Maintains hematologic stability  Description: INTERVENTIONS  - Assess for signs and symptoms of bleeding or hemorrhage  - Monitor labs  - Administer supportive blood products/factors as ordered and appropriate  6/6/2022 1028 by Chemo Leon RN  Outcome: Progressing  6/6/2022 1028 by Chemo Leon RN  Outcome: Progressing     Problem: MUSCULOSKELETAL - ADULT  Goal: Maintain or return mobility to safest level of function  Description: INTERVENTIONS:  - Assess patient's ability to carry out ADLs; assess patient's baseline for ADL function and identify physical deficits which impact ability to perform ADLs (bathing, care of mouth/teeth, toileting, grooming, dressing, etc )  - Assess/evaluate cause of self-care deficits   - Assess range of motion  - Assess patient's mobility  - Assess patient's need for assistive devices and provide as appropriate  - Encourage maximum independence but intervene and supervise when necessary  - Involve family in performance of ADLs  - Assess for home care needs following discharge   - Consider OT consult to assist with ADL evaluation and planning for discharge  - Provide patient education as appropriate  6/6/2022 1028 by Chemo Leon RN  Outcome: Progressing  6/6/2022 1028 by Chemo Leon RN  Outcome: Progressing     Problem: COPING  Goal: Pt/Family able to verbalize concerns and demonstrate effective coping strategies  Description: INTERVENTIONS:  - Assist patient/family to identify coping skills, available support systems and cultural and spiritual values  - Provide emotional support, including active listening and acknowledgement of concerns of patient and caregivers  - Reduce environmental stimuli, as able  - Provide patient education  - Assess for spiritual pain/suffering and initiate spiritual care, including notification of Pastoral Care or [place] based community as needed  - Assess effectiveness of coping strategies  6/6/2022 1028 by Hermelindo Tejada RN  Outcome: Progressing  6/6/2022 1028 by Hermelindo Tejada RN  Outcome: Progressing  Goal: Will report anxiety at manageable levels  Description: INTERVENTIONS:  - Administer medication as ordered  - Teach and encourage coping skills  - Provide emotional support  - Assess patient/family for anxiety and ability to cope  6/6/2022 1028 by Hermelindo Tejada RN  Outcome: Progressing  6/6/2022 1028 by Hermelindo Tejada RN  Outcome: Progressing     Problem: PAIN - ADULT  Goal: Verbalizes/displays adequate comfort level or baseline comfort level  Description: Interventions:  - Encourage patient to monitor pain and request assistance  - Assess pain using appropriate pain scale  - Administer analgesics based on type and severity of pain and evaluate response  - Implement non-pharmacological measures as appropriate and evaluate response  - Consider cultural and social influences on pain and pain management  - Notify physician/advanced practitioner if interventions unsuccessful or patient reports new pain  6/6/2022 1028 by Hermelindo Tejada RN  Outcome: Progressing  6/6/2022 1028 by Hermelindo Tejada RN  Outcome: Progressing     Problem: INFECTION - ADULT  Goal: Absence or prevention of progression during hospitalization  Description: INTERVENTIONS:  - Assess and monitor for signs and symptoms of infection  - Monitor lab/diagnostic results  - Monitor all insertion sites, i e  indwelling lines, tubes, and drains  - Monitor endotracheal if appropriate and nasal secretions for changes in amount and color  - [place] appropriate cooling/warming therapies per order  - Administer medications as ordered  - Instruct and encourage patient and family to use good hand hygiene technique  - Identify and instruct in appropriate isolation precautions for identified infection/condition  6/6/2022 1028 by Bruno Medrano RN  Outcome: Progressing  6/6/2022 1028 by Bruno Medrano RN  Outcome: Progressing     Problem: SAFETY ADULT  Goal: Patient will remain free of falls  Description: INTERVENTIONS:  - Educate patient/family on patient safety including physical limitations  - Instruct patient to call for assistance with activity   - Consult OT/PT to assist with strengthening/mobility   - Keep Call bell within reach  - Keep bed low and locked with side rails adjusted as appropriate  - Keep care items and personal belongings within reach  - Initiate and maintain comfort rounds  - Make Fall Risk Sign visible to staff  - Offer Toileting every 2 Hours, in advance of need  - Initiate/Maintain bed alarm  - Obtain necessary fall risk management equipment: bed alarm  - Apply yellow socks and bracelet for high fall risk patients  - Consider moving patient to room near nurses station  6/6/2022 1028 by Bruno Medrano RN  Outcome: Progressing  6/6/2022 1028 by Bruno Medrano RN  Outcome: Progressing  Goal: Maintain or return to baseline ADL function  Description: INTERVENTIONS:  -  Assess patient's ability to carry out ADLs; assess patient's baseline for ADL function and identify physical deficits which impact ability to perform ADLs (bathing, care of mouth/teeth, toileting, grooming, dressing, etc )  - Assess/evaluate cause of self-care deficits   - Assess range of motion  - Assess patient's mobility; develop plan if impaired  - Assess patient's need for assistive devices and provide as appropriate  - Encourage maximum independence but intervene and supervise when necessary  - Involve family in performance of ADLs  - Assess for home care needs following discharge   - Consider OT consult to assist with ADL evaluation and planning for discharge  - Provide patient education as appropriate  6/6/2022 1028 by Bushra Sosa RN  Outcome: Progressing  6/6/2022 1028 by Bushra Sosa RN  Outcome: Progressing  Goal: Maintains/Returns to pre admission functional level  Description: INTERVENTIONS:  - Perform BMAT or MOVE assessment daily    - Set and communicate daily mobility goal to care team and patient/family/caregiver  - Collaborate with rehabilitation services on mobility goals if consulted  - Perform Range of Motion 3 times a day  - Reposition patient every 2 hours    - Dangle patient 3 times a day  - Stand patient 3 times a day  - Ambulate patient 3 times a day  - Out of bed to chair 3 times a day   - Out of bed for meals 3 times a day  - Out of bed for toileting  - Record patient progress and toleration of activity level   6/6/2022 1028 by Bushra Sosa RN  Outcome: Progressing  6/6/2022 1028 by Bushra Sosa RN  Outcome: Progressing     Problem: DISCHARGE PLANNING  Goal: Discharge to home or other facility with appropriate resources  Description: INTERVENTIONS:  - Identify barriers to discharge w/patient and caregiver  - Arrange for needed discharge resources and transportation as appropriate  - Identify discharge learning needs (meds, wound care, etc )  - Arrange for interpretive services to assist at discharge as needed  - Refer to Case Management Department for coordinating discharge planning if the patient needs post-hospital services based on physician/advanced practitioner order or complex needs related to functional status, cognitive ability, or social support system  6/6/2022 1028 by Bushra Sosa RN  Outcome: Progressing  6/6/2022 1028 by Bushra Sosa RN  Outcome: Progressing Problem: Knowledge Deficit  Goal: Patient/family/caregiver demonstrates understanding of disease process, treatment plan, medications, and discharge instructions  Description: Complete learning assessment and assess knowledge base    Interventions:  - Provide teaching at level of understanding  - Provide teaching via preferred learning methods  6/6/2022 1028 by Kerri Bedolla RN  Outcome: Progressing  6/6/2022 1028 by Kerri Bedolla RN  Outcome: Progressing

## 2022-06-06 NOTE — PLAN OF CARE
Problem: OCCUPATIONAL THERAPY ADULT  Goal: Performs self-care activities at highest level of function for planned discharge setting  See evaluation for individualized goals  Description: Treatment Interventions: ADL retraining, Functional transfer training, UE strengthening/ROM, Endurance training, Visual perceptual retraining, Cognitive reorientation, Patient/family training, Equipment evaluation/education, Neuromuscular reeducation, Fine motor coordination activities, Compensatory technique education, Activityengagement          See flowsheet documentation for full assessment, interventions and recommendations  Outcome: Progressing  Note: Limitation: Decreased ADL status, Decreased UE ROM, Decreased endurance, Decreased sensation, Visual deficit, Decreased fine motor control, Decreased self-care trans, Decreased high-level ADLs  Prognosis: Good  Assessment: Pt seen for OT session focusing on self care, dynamic balance/safety and cognitive skills as well as functional use of BUE for adl tasks - pt set up in chair for AM care -required cues to initiate tasks - able to complete UB tasks with SBA and min cues, min a to bathe LB, mod a to dress LB - continues with R sided weakness/incoordination but improved from prior sessions - pt does verbalize that he has carpal tunnel on RUE and is limited with coordination/dexterity 2* same - transfers with min a - pt continues to progress towards goals as stated on eval - would continue to benefit from inpt rehab when medically cleared - OT to continue to follow to address goals as stated on initial eval  Recommendation: Physiatry Consult  OT Discharge Recommendation: Post acute rehabilitation services  OT - OK to Discharge:  Yes

## 2022-06-06 NOTE — OCCUPATIONAL THERAPY NOTE
Occupational Therapy Progress Note     Patient Name: Abena KAUFFMAN Date: 6/6/2022  Problem List  Principal Problem:    SDH (subdural hematoma) ([address] )  Active Problems:    Hypercholesterolemia    Constipation    Urinary retention        06/06/22 1155   OT Last Visit   OT Visit Date 06/06/22   Note Type   Note Type Treatment   Restrictions/Precautions   Weight Bearing Precautions Per Order No   Other Precautions Fall Risk;Hard of hearing   Lifestyle   Autonomy I adls and mobility w/o ad - i iadls - shares homemaking with spouse - family reports pt very active - still cuts grass etc   Reciprocal Relationships supportive family - spouse present   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   Pain Assessment   Pain Assessment Tool 0-10   Pain Score No Pain   ADL   Where Assessed Chair   Eating Assistance 7  Independent   Grooming Assistance 5  Supervision/Setup   UB Bathing Assistance 5  Supervision/Setup   LB Bathing Assistance 4  [address]; Increased time to complete;Right upper leg;Left upper leg;Left lower leg including foot;Right lower leg including foot  (assist for buttocks/perineum with visual and verbal cues to initiate/sequence tasks)   UB Dressing Assistance 5  Supervision/Setup   LB Dressing Assistance 3  Moderate Assistance   LB Dressing Deficit Don/doff R sock; Don/doff L sock; Thread RLE into pants; Thread LLE into pants;Steadying; Requires assistive device for steadying; Increased time to complete;Pull up over hips; Fasteners  (cues)   LB Dressing Comments assist to thread LE's into pants and assist for dynamic balance when standing to pull over hips/manage ties   Toileting Assistance  4  Minimal Assistance   Bed Mobility   Additional Comments oob in chair   Transfers   Sit to Stand 4  Minimal assistance   Stand to Sit 4  Minimal assistance   Stand pivot 4  Minimal assistance   Functional Mobility Functional Mobility 4  Minimal assistance   Additional items Rolling walker   Cognition   Arousal/Participation Arousable; Cooperative   Attention Attends with cues to redirect   Orientation Level Oriented to person;Oriented to place;Oriented to situation;Oriented to time  (with choices/general time)   Memory Decreased recall of precautions   Following Commands Follows one step commands with increased time or repetition   Comments limited by hearing deficits but able to follow with visual cues   Activity Tolerance   Activity Tolerance Patient tolerated treatment well   Assessment   Assessment Pt seen for OT session focusing on self care, dynamic balance/safety and cognitive skills as well as functional use of BUE for adl tasks - pt set up in chair for AM care -required cues to initiate tasks - able to complete UB tasks with SBA and min cues, min a to bathe LB, mod a to dress LB - continues with R sided weakness/incoordination but improved from prior sessions - pt does verbalize that he has carpal tunnel on RUE and is limited with coordination/dexterity 2* same - transfers with min a - pt continues to progress towards goals as stated on eval - would continue to benefit from inpt rehab when medically cleared - OT to continue to follow to address goals as stated on initial eval   Plan   Treatment Interventions ADL retraining;Functional transfer training;UE strengthening/ROM; Endurance training;Cognitive reorientation;Patient/family training;Equipment evaluation/education; Neuromuscular reeducation; Fine motor coordination activities; Compensatory technique education; Energy conservation; Activityengagement   Goal Expiration Date 06/12/22   OT Treatment Day 3   OT Frequency 3-5x/wk   Recommendation   OT Discharge Recommendation Post acute rehabilitation services   OT - OK to Discharge Yes   AM-PAC Daily Activity Inpatient   Lower Body Dressing 2   Bathing 3   Toileting 3   Upper Body Dressing 3   Grooming 3   Eating 3 Daily Activity Raw Score 17   Daily Activity Standardized Score (Calc for Raw Score >=11) 37 26   AM-[facility] Applied Cognition Inpatient   Following a Speech/Presentation 2   Understanding Ordinary Conversation 3   Taking Medications 2   Remembering Where Things Are Placed or Put Away 2   Remembering List of 4-5 Errands 2   Taking Care of Complicated Tasks 2   Applied Cognition Raw Score 13   Applied Cognition Standardized Score 30 46     The patient's raw score on the AM-PAC Daily Activity inpatient short form is 17, standardized score is 37 26, less than 39 4  Patients at this level are likely to benefit from discharge to post-acute rehabilitation services  Please refer to the recommendation of the Occupational Therapist for safe discharge planning      Krys Benton

## 2022-06-06 NOTE — PHYSICAL THERAPY NOTE
Physical Therapy Progress Note     06/06/22 1220   PT Last Visit   PT Visit Date 06/06/22   Note Type   Note Type Treatment   Pain Assessment   Pain Assessment Tool 0-10   Pain Score No Pain   Pain Rating: FLACC (Rest) - Face 0   Pain Rating: FLACC (Rest) - Legs 0   Pain Rating: FLACC (Rest) - Activity 0   Pain Rating: FLACC (Rest) - Cry 0   Pain Rating: FLACC (Rest) - Consolability 0   Score: FLACC (Rest) 0   Pain Rating: FLACC (Activity) - Face 0   Pain Rating: FLACC (Activity) - Legs 0   Pain Rating: FLACC (Activity) - Activity 0   Pain Rating: FLACC (Activity) - Cry 0   Pain Rating: FLACC (Activity) - Consolability 0   Score: FLACC (Activity) 0   Restrictions/Precautions   Other Precautions Fall Risk;Hard of hearing;Bed Alarm; Chair Alarm  (Alarm active post session )   Subjective   Subjective The pt  is eager to walk  Transfers   Sit to Stand 4  Minimal assistance   Additional items Assist x 1; Increased time required;Verbal cues   Stand to Sit 4  Minimal assistance   Additional items Assist x 1   Ambulation/Elevation   Gait pattern Excessively slow; Inconsistent savana   Gait Assistance 4  Minimal assist   Additional items Assist x 1;Verbal cues; Tactile cues   Assistive Device Rolling walker   Distance 250 feet  Balance   Static Sitting Good   Dynamic Sitting Fair   Static Standing Poor +   Ambulatory Poor +   Activity Tolerance   Activity Tolerance Patient tolerated treatment well   Nurse [address], RN  Assessment   Prognosis Good   Problem List Decreased strength;Decreased endurance; Impaired balance;Decreased coordination;Decreased cognition;Decreased mobility; Decreased safety awareness   Assessment The pt  has much improved mobility during this session in not only ambulatory distance, but also his balance   Most notably he requires continued assistance for steering the walker and manuevering around in the environment, but this is much improved from when this therapist worked with the patient three days ago  He does demonstrate some impulsivity as he would quickly change direction at time, and at other times attempt to ambulate too hastily  He had no loss of balance, and he was able to perform dynamic head movements during gait  He will benefit from continued therapies in order to facilitate his return to independence  Goals   Patient Goals To get home  STG Expiration Date 06/08/22   PT Treatment Day 5   Plan   Treatment/Interventions Functional transfer training;LE strengthening/ROM; Endurance training;Patient/family training;Bed mobility;Gait training;Elevations; Therapeutic exercise   Progress Progressing toward goals   PT Frequency 4-6x/wk   Recommendation   PT Discharge Recommendation Post acute rehabilitation services   Equipment Recommended [facility] Recommended Wheeled walker   AM-[facility] Basic Mobility Inpatient   Turning in Bed Without Bedrails 3   Lying on Back to Sitting on Edge of Flat Bed 3   Moving Bed to Chair 3   Standing Up From Chair 3   Walk in Room 3   Climb 3-5 Stairs 2   Basic Mobility Inpatient Raw Score 17   Basic Mobility Standardized Score 39 67   Highest Level Of Mobility   -HL Goal 5: Stand one or more mins   JH-HLM Achieved 8: Walk 250 feet ot more       An AM-PAC Basic Mobility standardized score less than 40 78 suggests the patient may benefit from discharge to post-acute rehab services      Sara Herndon PTA

## 2022-06-06 NOTE — CONSULTS
Internal Medicine Consultation Note    Patient: Lei Fortune  Age/sex: 79 y o  male  Medical Record #: [id number]      ASSESSMENT PLAN    SDH s/p Lt craniotomy  · Monitor incisions  · Follow-up with Neurosurgery in 2 weeks  · Keppra for 7 days for seizure prophylaxis  Hyperlipidemia  · Continue statin  · Low cholesterol diet  Subjective/ HPI: Patient seen and examined  He is an 78yo male, with hyperlipidemia and hearing impairment, who presented 5/27/22 after a head CT revealed acute on chronic SDH  He had been hospitalized in March after a fall with resultant SDH  He was being managed conservatively until the head CT revealed new findings  He underwent Lt craniotomy for SDH with frontal and parietal drain placement  He went for MMA embolization 6/2/22 but it was not able to be performed as 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  Post-op he did have urinary retention which improved with Flomax  He was determined to be stable for transfer to the [facility] 6/6/22  IM consulted for medical management  ROS:   A 10 point ROS was performed; negative except as noted above  Social History:    Substance Use History:   Social History     Substance and Sexual Activity   Alcohol Use Yes    Comment: Rare     Social History     Tobacco Use   Smoking Status Former Smoker   Smokeless Tobacco Never Used   Tobacco Comment    Smoked as a teenager     Social History     Substance and Sexual Activity   Drug Use No       Family History:    No family history on file        Review of Scheduled Meds:  Current Facility-Administered Medications   Medication Dose Route Frequency Provider Last Rate    acetaminophen  650 mg Oral Q6H PRN Jordan Barr MD      heparin (porcine)  5,000 Units Subcutaneous [facility] Jordan Barr MD      melatonin  6 mg Oral HS Jordan Barr MD      oxyCODONE  5 mg Oral Q4H PRN Jordan Barr MD      Or   Manan Limon oxyCODONE  2 5 mg Oral Q4H PRN Jordan Barr MD      [START ON 6/7/2022] polyethylene glycol 17 g Oral Daily PRN Bryan Lott MD      pravastatin  40 mg Oral Daily With Alan Delacruz MD      tamsulosin  0 4 mg Oral Daily With Alan Delacruz MD         Labs:     Results from last 7 days   Lab Units 22  0508 22  0449   WBC Thousand/uL 5 16 5 26   HEMOGLOBIN g/dL 13 3 13 5   HEMATOCRIT % 38 6 39 0   PLATELETS Thousands/uL 432* 403*     Results from last 7 days   Lab Units 22  0508 22  0449   SODIUM mmol/L 138 136   POTASSIUM mmol/L 4 0 3 8   CHLORIDE mmol/L 105 102   CO2 mmol/L 29 28   BUN mg/dL 15 14   CREATININE mg/dL 0 70 0 68   CALCIUM mg/dL 9 3 9 1         Results from last 7 days   Lab Units 22  0524   INR  1 02              No results found for: Kassandra Mosher, Geovanni Castellanos, SPUTUMCULTUR    Input and Output Summary (last 24 hours):     No intake or output data in the 24 hours ending 22 1458    Imaging:     No orders to display       *Labs /Radiology studiesLabs reviewed  *Medications reviewed and reconciled as needed  *Please refer to order section for additional ordered labs studies  *Case discussed with primary attending during morning huddle case rounds    Vitals:   Temp (24hrs), Av 9 °F (36 6 °C), Min:97 5 °F (36 4 °C), Max:98 3 °F (36 8 °C)    Temp:  [97 5 °F (36 4 °C)-98 3 °F (36 8 °C)] 98 2 °F (36 8 °C)  HR:  [81-95] 82  Resp:  [16-18] 18  BP: ()/(61-90) 102/65  SpO2:  [94 %-97 %] 97 %  Body mass index is 24 41 kg/m²  Physical Exam:   HEENT:  Head: Normocephalic, no lesions, without obvious abnormality  CARDIAC:  regular rate and rhythm, S1, S2 normal, no murmur, click, rub or gallop  LUNGS:  normal air entry, lungs clear to auscultation  ABDOMEN:  soft, non-tender  Bowel sounds normal  No masses, no organomegaly  EXTREMITIES:  extremities normal, warm and well-perfused; no cyanosis, clubbing, or edema  NEURO:   normal without focal findings and hearing impaired speech  PSYCH:  Alert and oriented, appropriate affect    INCISION: C/D/I      Invasive Devices  Report    Peripheral Intravenous Line  Duration           Peripheral IV 06/06/22 Proximal;Right;Ventral (anterior) Forearm <1 day                 VTE Pharmacologic Prophylaxis: Heparin  Code Status: Level 1 - Full Code  Current Length of Stay: 0 day(s)    Total floor / unit time spent today 1 hour with more than 50% spent counseling/coordinating care  Counseling includes discussion with patient re: progress  and discussion with patient of his/her current medical state/information  Coordination of patient's care was performed in conjunction with primary service  Time invested included review of patient's labs, vitals, and management of their comorbidities with continued monitoring  In addition, this patient was discussed with medical team including physician and advanced extenders  The care of the patient was extensively discussed and appropriate treatment plan was formulated unique for this patient  ** Please Note: Fluency Direct voice to text software may have been used in the creation of this document   Audio transcription errors may occur**

## 2022-06-06 NOTE — CASE MANAGEMENT
Case Management Discharge Planning Note    Patient name Eugene Lucas  Location [facility]99-48 MRN [id number]  : 1940 Date 2022       Current Admission Date: 2022  Current Admission Diagnosis:SDH (subdural hematoma) [facility])   Patient Active Problem List    Diagnosis Date Noted    Constipation 2022    Urinary retention 2022    [facility]    Primary osteoarthritis of left knee 2022    Hygroma 2022    Right hand pain     Carpal tunnel syndrome on right     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 2021    Overweight (BMI 25 0-29 9) 2021    Negative depression screening 2019    Medicare annual wellness visit, subsequent 2019    Hypercholesterolemia 2018    Borderline hypertension 2018    Hearing loss 2009      LOS (days): 10  Geometric Mean LOS (GMLOS) (days): 6 60  Days to GMLOS:-3 2     OBJECTIVE:  Risk of Unplanned Readmission Score: 13 13         Current admission status: Inpatient   [facility] Pharm[facility]   [facility] [place] [address] [address] [address]  Phone: [phone] Fax: [phone]    Primary Care Provider: Jim Hoyos DO    Primary Insurance: MEDICARE  Secondary Insurance: [facility] DETAILS:    Pt will d/c to [facility] today @1330  Pt will d/c into room 459 and report can be called to [phone]  Pt's nurse and dtr were both made aware

## 2022-06-06 NOTE — ASSESSMENT & PLAN NOTE
· Initial fall in march with small SDH, increased after fall in April and then decreased  Initially managed with serial imaging and exams  On short interval outpatient CT 5/27 found to have new complex mixed density L sided holo hemisphere subdural hematomas, new from prior 5/10  · Sent to the ER for neurosurgical evaluation   · Taken to OR 5/28 for L craniotomy with SD drain placement x 2  · Both drains now removed   · [facility] 6/2 - stable 2 3 cm subdural fluid collection with subdural pneumocephalus, scattered acute blood products, similar persistent mass effect on left cerebral hemisphere, partial effacement of left lateral ventricle, and 0 8 cm rightward midline shift  · Attempted MMA embolization 6/2 but aborted 2/2 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

## 2022-06-06 NOTE — PCC NURSING
SDH s/p Lt craniotomy - Monitor incisions  , Follow-up with Neurosurgery in 2 weeks, Keppra for 7 days for seizure prophylaxis  Hyperlipidemia - Continue statin, Low cholesterol diet  Urinary retention Was placed on flomax on acute side Now with orthostasis dc'd flomax 6/7  Orthostasis Encourage fluids Off flomax as above Despite thigh high teds/abdominal binder c/w orthostasis Add low dose midodrine in the am and prn dose at lunch time orthostasis improved with midodrine  [place] Assistive device at bedside  Pt is continent of both bowel and bladder  Pt requires alarms for safety  This week we will monitor lab values and vital signs  We will educate on the importance of turning and offloading in order to prevent skin breakdown and preform routine skin checks  We will encourage independence with ADLs  We will preform safe transfers and keep the pt free from falls  We will monitor for constipation and medicate per bowel protocol

## 2022-06-06 NOTE — PLAN OF CARE
Problem: PHYSICAL THERAPY ADULT  Goal: Performs mobility at highest level of function for planned discharge setting  See evaluation for individualized goals  Description: Treatment/Interventions: Functional transfer training, LE strengthening/ROM, Elevations, Therapeutic exercise, Endurance training, Patient/family training, Equipment eval/education, Bed mobility, Gait training, Spoke to nursing, OT  Equipment Recommended: Abdirashid Reason       See flowsheet documentation for full assessment, interventions and recommendations  Outcome: Progressing  Note: Prognosis: Good  Problem List: Decreased strength, Decreased endurance, Impaired balance, Decreased coordination, Decreased cognition, Decreased mobility, Decreased safety awareness  Assessment: The pt  has much improved mobility during this session in not only ambulatory distance, but also his balance  Most notably he requires continued assistance for steering the walker and manuevering around in the environment, but this is much improved from when this therapist worked with the patient three days ago  He does demonstrate some impulsivity as he would quickly change direction at time, and at other times attempt to ambulate too hastily  He had no loss of balance, and he was able to perform dynamic head movements during gait  He will benefit from continued therapies in order to facilitate his return to independence  Barriers to Discharge: Inaccessible home environment, Decreased caregiver support        PT Discharge Recommendation: Post acute rehabilitation services          See flowsheet documentation for full assessment

## 2022-06-06 NOTE — PCC SPEECH THERAPY
ST orders received with plans to complete cognitive linguistic/language evaluation on 6/7/22  Evaluation results and recommendations to follow  Update from week 6/13/2022: Pt completed the Informal Language Assessment as well a portions of the Informal Cognitive assessment Overall pt is demonstrating minimal deficits noted in overall verbal comprehension given significant [place] deficit to which pocket talker was initially used but verbal presentation given information was decreased in pt's ability to respond accurately or appropriately  SLP did utilize written expression for all structured question and tasks presented as result of hearing deficit  Pt did appear to have fairly good comprehension given information BUT noted to have slower processing when answering multiple questions  As for pt's speech output, pt noted to gesture numbers and verbalize short 1-2 word answers  Pt did state to SLP about how he feels his "speech" is not good despite pt's ability to complete object naming, responsive naming, automatic speech tasks w/o difficulty  When attempting to have pt verbalize longer utterances, that is when it was noted that pt did demonstrate slower speech output, prolongation given sounds for words, etc  In regards to basic functional cognitive skills, pt was demonstrating good insight to problem solving, reasoning, organization  Overall, suspect that his cognitive linguistic skills are fairly close to prior level of functioning, but [place] status impacts abilities for certain tasks at this time  Continued use of pocket talker has been used across therapies but pt does consistently state that "I can't hear you" even w/ use  When using white board, pt's comprehension and ability to complete tasks improve   Spoke w/ pt's wife at the end of session, providing update in regards to assessment, to which she does report that they tend to use their own "sign language" to communicate at home as pt's hearing has worsened over the years  SLP providing update in adequate comprehension skills but slower processing noted when answering questions, etc  Additionally, stated to wife that while LTM recall was functional, suspect there will be decreased ST memory and difficulty in completing new learning tasks, skills which will be introduced while in the acute rehab center  Focus has been towards eliciting more speech output as pt has been relying on 1-2 word responses, in which pt can elaborate more expression for describing pictures given increased time  Functional level for pt at this time are supervision level for both language and cognitive skills  Currently, pt is at prior level of functioning for cognitive linguistic skills as pt's wife is available and does assist w/ most I ADL tasks and will continue to assist at time of discharge  No further skilled ST services warranted at this time while on the [facility] and at time of discharge but will benefit from ongoing skilled OT/PT services to maximize overall functional mobility skills

## 2022-06-06 NOTE — PLAN OF CARE
Problem: PAIN - ADULT  Goal: Verbalizes/displays adequate comfort level or baseline comfort level  Description: Interventions:  - Encourage patient to monitor pain and request assistance  - Assess pain using appropriate pain scale  - Administer analgesics based on type and severity of pain and evaluate response  - Implement non-pharmacological measures as appropriate and evaluate response  - Consider cultural and social influences on pain and pain management  - Notify physician/advanced practitioner if interventions unsuccessful or patient reports new pain  Outcome: Progressing     Problem: INFECTION - ADULT  Goal: Absence or prevention of progression during hospitalization  Description: INTERVENTIONS:  - Assess and monitor for signs and symptoms of infection  - Monitor lab/diagnostic results  - Monitor all insertion sites, i e  indwelling lines, tubes, and drains  - Monitor endotracheal if appropriate and nasal secretions for changes in amount and color  - [place] appropriate cooling/warming therapies per order  - Administer medications as ordered  - Instruct and encourage patient and family to use good hand hygiene technique  - Identify and instruct in appropriate isolation precautions for identified infection/condition  Outcome: Progressing  Goal: Absence of fever/infection during neutropenic period  Description: INTERVENTIONS:  - Monitor WBC    Outcome: Progressing     Problem: SAFETY ADULT  Goal: Patient will remain free of falls  Description: INTERVENTIONS:  - Educate patient/family on patient safety including physical limitations  - Instruct patient to call for assistance with activity   - Consult OT/PT to assist with strengthening/mobility   - Keep Call bell within reach  - Keep bed low and locked with side rails adjusted as appropriate  - Keep care items and personal belongings within reach  - Initiate and maintain comfort rounds  - Make Fall Risk Sign visible to staff  - Offer Toileting every Hours, in advance of need  - Initiate/Maintainlarm  - Obtain necessary fall risk management equipment:   - Apply yellow socks and bracelet for high fall risk patients  - Consider moving patient to room near nurses station  Outcome: Progressing  Goal: Maintain or return to baseline ADL function  Description: INTERVENTIONS:  -  Assess patient's ability to carry out ADLs; assess patient's baseline for ADL function and identify physical deficits which impact ability to perform ADLs (bathing, care of mouth/teeth, toileting, grooming, dressing, etc )  - Assess/evaluate cause of self-care deficits   - Assess range of motion  - Assess patient's mobility; develop plan if impaired  - Assess patient's need for assistive devices and provide as appropriate  - Encourage maximum independence but intervene and supervise when necessary  - Involve family in performance of ADLs  - Assess for home care needs following discharge   - Consider OT consult to assist with ADL evaluation and planning for discharge  - Provide patient education as appropriate  Outcome: Progressing  Goal: Maintains/Returns to pre admission functional level  Description: INTERVENTIONS:  - Perform BMAT or MOVE assessment daily    - Set and communicate daily mobility goal to care team and patient/family/caregiver     - Collaborate with rehabilitation services on mobility goals if consulted  - Perform Range of   - Out of bed for toileting  - Record patient progress and toleration of activity level   Outcome: Progressing     Problem: DISCHARGE PLANNING  Goal: Discharge to home or other facility with appropriate resources  Description: INTERVENTIONS:  - Identify barriers to discharge w/patient and caregiver  - Arrange for needed discharge resources and transportation as appropriate  - Identify discharge learning needs (meds, wound care, etc )  - Arrange for interpretive services to assist at discharge as needed  - Refer to Case Management Department for coordinating discharge planning if the patient needs post-hospital services based on physician/advanced practitioner order or complex needs related to functional status, cognitive ability, or social support system  Outcome: Progressing

## 2022-06-06 NOTE — INCIDENTAL FINDINGS
The following findings require follow up:  Radiographic finding   Findin2022- CT head wo contrast- incidental finding-VISUALIZED ORBITS AND PARANASAL SINUSES: Mild left maxillary mucosal thickening  Globes and orbits are within normal limits   "    X-ray left knee-incidental finding-Severe degenerative disease of the medial compartment is seen with moderate to severe osteoarthritis of the patellofemoral joint   No lytic or blastic osseous lesion "    Left knee osteoarthritis, maxillary mucosal thickening     Follow up required: PCP   Follow up should be done within one week(s)    Please notify the following clinician to assist with the follow up:   Dr Lazarus Nan, DO - PCP

## 2022-06-06 NOTE — DISCHARGE SUMMARY
[address] [facility]  Discharge- [facility] Abigail 1940, 79 y o  male MRN: [id number]  Unit/Bed#: [facility] 601-01 Encounter: [id number]  Primary Care Provider: Rohit Smith DO   Date and time admitted to hospital: 5/27/2022  3:04 PM    Urinary retention  Assessment & Plan  · Patient voiding; 450 mL today  · Continue Flomax 0 4mg      Constipation  Assessment & Plan  · Increased bowel regimen to sennakot S BID, milalax daily, dulcolax suppository daily     Hypercholesterolemia  Assessment & Plan  · Pravastatin 40mg    * SDH (subdural hematoma) (HCC)  Assessment & Plan  · Initial fall in march with small SDH, increased after fall in April and then decreased  Initially managed with serial imaging and exams  On short interval outpatient CT 5/27 found to have new complex mixed density L sided holo hemisphere subdural hematomas, new from prior 5/10  · Sent to the ER for neurosurgical evaluation   · Taken to OR 5/28 for L craniotomy with SD drain placement x 2  · Both drains now removed   · [facility] 6/2 - stable 2 3 cm subdural fluid collection with subdural pneumocephalus, scattered acute blood products, similar persistent mass effect on left cerebral hemisphere, partial effacement of left lateral ventricle, and 0 8 cm rightward midline shift  · Attempted MMA embolization 6/2 but aborted 2/2 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            Medical Problems             Resolved Problems  Date Reviewed: 6/6/2022   None                 Admission Date:   Admission Orders (From admission, onward)     Ordered        05/27/22 1624  Inpatient Admission Once                        Admitting Diagnosis: Subdural hematoma ([facility] [address] ) [S06 [id number]]  Illness, unspecified [R69]    HPI: "Alexandria Gutierrez is a 79 y o  male who presents following a routine follow-up head CT for history of recent fall with subdural hemorrhage  CT head today showed enlarging acute on chronic subdural hematoma with increasing midline shift  He was subsequently contacted by the neurosurgery team who recommended he attend the emergency department for evaluation for possible evacuation  Currently patient reports no new symptoms  He denies numbness, weakness, headache or dizziness  He is hard of hearing but this is normal for him  He denies any anticoagulant or anti-platelet use "    Procedures Performed: No orders of the defined types were placed in this encounter  Summary of Hospital Course:  Patient initially presented following a routine follow-up head CT and recent fall with subdural hemorrhage with head CT denoting enlarging acute on chronic subdural hematoma with expanding midline shift, with neurosurgery team immediately contacted for evaluation  Patient with left craniotomy for subdural hematoma on 05/28/2022, with ICU postoperative treatment, care of drains and with patient transitioning to med/surg level care with Neurosurgery continuing to following treat patient along with PT OT evaluation and treatment  Patient with discontinued cranial drains, 5/30 and 6/1 with subcutaneous heparin starting on 05/03/2022  Patient with improvement and was recommended to be discharged to [facility] for further treatment and rehabilitation  Neurosurgery with continued recommendations of Keppra 100 mg b i d  7 days postoperative, and we will see patient in 2 week and 6 week POV's with repeat head scans        · Significant Findings, Care, Treatment and Services Provided: "CT head w/o, 5/31/22: Left subdural collection that is mostly due to pneumocephalus and fluid is overall stable in size following removal of one of 2 subdural drains but small amount of residual subdural blood products mildly increased    Stable mass effect with 1 cm of left-to-right shift  With left craniotomy for SDH evaluation on 5/28/2022  Cerebral angiography left mma embolization was not performed    Complications: none    Condition at Discharge: stable     Physical Exam  Vitals and nursing note reviewed  Constitutional:       Appearance: Normal appearance  He is well-developed and normal weight  Comments: /61   Pulse 81   Temp 98 3 °F (36 8 °C)   Resp 17   Ht 5' 11" (1 803 m)   Wt 79 4 kg (175 lb)   SpO2 95%   BMI 24 41 kg/m²      HENT:      Head: Normocephalic and atraumatic  Jaw: There is normal jaw occlusion  Right Ear: Hearing and tympanic membrane normal       Left Ear: Hearing and tympanic membrane normal       Nose: Nose normal       Mouth/Throat:      Lips: Pink  Mouth: Mucous membranes are moist       Pharynx: Oropharynx is clear  Eyes:      General: Lids are normal  Vision grossly intact  Gaze aligned appropriately  Extraocular Movements: Extraocular movements intact  Conjunctiva/sclera: Conjunctivae normal       Pupils: Pupils are equal, round, and reactive to light  Neck:      Vascular: No JVD  Trachea: Trachea and phonation normal    Cardiovascular:      Rate and Rhythm: Normal rate and regular rhythm  Pulses: Normal pulses  Radial pulses are 2+ on the right side and 2+ on the left side  Dorsalis pedis pulses are 2+ on the right side and 2+ on the left side  Heart sounds: Normal heart sounds  No murmur heard  Pulmonary:      Effort: Pulmonary effort is normal  No respiratory distress  Breath sounds: Normal breath sounds and air entry  Abdominal:      General: Abdomen is flat  Bowel sounds are normal       Palpations: Abdomen is soft  Tenderness: There is no abdominal tenderness     Musculoskeletal:         General: Normal range of motion  Cervical back: Full passive range of motion without pain, normal range of motion and neck supple  Feet:      Right foot:      Toenail Condition: Right toenails are normal       Left foot:      Toenail Condition: Left toenails are normal    Lymphadenopathy:      Head:      Right side of head: No submental, submandibular, tonsillar, preauricular, posterior auricular or occipital adenopathy  Left side of head: No submental, submandibular, tonsillar, preauricular, posterior auricular or occipital adenopathy  Cervical: No cervical adenopathy  Right cervical: No superficial, deep or posterior cervical adenopathy  Left cervical: No superficial, deep or posterior cervical adenopathy  Skin:     General: Skin is warm and dry  Capillary Refill: Capillary refill takes less than 2 seconds  Findings: No rash  Neurological:      General: No focal deficit present  Mental Status: He is alert and oriented to person, place, and time  Mental status is at baseline  GCS: GCS eye subscore is 4  GCS verbal subscore is 5  GCS motor subscore is 6  Cranial Nerves: Cranial nerves are intact  Psychiatric:         Attention and Perception: Attention normal          Mood and Affect: Mood normal          Speech: Speech normal          Behavior: Behavior is cooperative  Discharge instructions/Information to patient and family:   See after visit summary for information provided to patient and family  Provisions for Follow-Up Care:  See after visit summary for information related to follow-up care and any pertinent home health orders  PCP: Chaz Barrow DO    Disposition: Short-term rehab at [facility] Readmission: No    Discharge Statement   I spent 28 minutes discharging the patient  This time was spent on the day of discharge  I had direct contact with the patient on the day of discharge   Additional documentation is required if more than 30 minutes were spent on discharge  Discharge Medications:  See after visit summary for reconciled discharge medications provided to patient and family

## 2022-06-07 PROCEDURE — 97167 OT EVAL HIGH COMPLEX 60 MIN: CPT

## 2022-06-07 PROCEDURE — 99233 SBSQ HOSP IP/OBS HIGH 50: CPT | Performed by: PHYSICAL MEDICINE & REHABILITATION

## 2022-06-07 PROCEDURE — 97535 SELF CARE MNGMENT TRAINING: CPT

## 2022-06-07 PROCEDURE — 92507 TX SP LANG VOICE COMM INDIV: CPT

## 2022-06-07 PROCEDURE — 99232 SBSQ HOSP IP/OBS MODERATE 35: CPT | Performed by: INTERNAL MEDICINE

## 2022-06-07 PROCEDURE — 97530 THERAPEUTIC ACTIVITIES: CPT

## 2022-06-07 PROCEDURE — 92523 SPEECH SOUND LANG COMPREHEN: CPT

## 2022-06-07 PROCEDURE — 97129 THER IVNTJ 1ST 15 MIN: CPT

## 2022-06-07 PROCEDURE — 97163 PT EVAL HIGH COMPLEX 45 MIN: CPT

## 2022-06-07 RX ADMIN — PRAVASTATIN SODIUM 40 MG: 40 TABLET ORAL at 15:44

## 2022-06-07 RX ADMIN — OXYCODONE HYDROCHLORIDE 5 MG: 5 TABLET ORAL at 12:34

## 2022-06-07 RX ADMIN — HEPARIN SODIUM 5000 UNITS: 5000 INJECTION INTRAVENOUS; SUBCUTANEOUS at 13:38

## 2022-06-07 RX ADMIN — HEPARIN SODIUM 5000 UNITS: 5000 INJECTION INTRAVENOUS; SUBCUTANEOUS at 05:07

## 2022-06-07 RX ADMIN — Medication 6 MG: at 21:17

## 2022-06-07 RX ADMIN — ACETAMINOPHEN 650 MG: 325 TABLET, FILM COATED ORAL at 05:24

## 2022-06-07 RX ADMIN — HEPARIN SODIUM 5000 UNITS: 5000 INJECTION INTRAVENOUS; SUBCUTANEOUS at 21:17

## 2022-06-07 NOTE — PCC PHYSICAL THERAPY
6/20/22  Pt requiring Supervision for stairs and community level ambulation  He is independent with household distances and has been progressed to IRP during the day time, A/S/GH at night  He cont to be limited by dec balance, dec coordination, dec gait speed, dec endurance, and extreme hearing impairment  Pt is planned d/c 6/21 to home with OPPT services to cont to improve these impairments  He has met all of his goals  Pt wife, Everett Zavala to provide supervision for stairs, and community ambulation  She has no concerns at this time and reports she has family who can provide increased assist as needed  Yosef Mathew, SPT   Co-signed by Annamraia Lopez DPT

## 2022-06-07 NOTE — PROGRESS NOTES
SLP Cognitive/Language Assessment       06/07/22 0900   Pain Assessment   Pain Assessment Tool 0-10   Restrictions/Precautions   Precautions Bed/chair alarms;Cognitive; Fall Risk;Hard of hearing;Seizure;Supervision on toilet/commode   Cognitive Linguisitic Assessments   Cognitive Linguistic Quick Test (CLQT) Refer to below for full details  Comprehension   Assist Devices Amp Headset; Other  (use of written/reading skills for most structured questions)   Auditory Basic   Visual Basic   Findings Pt completing the informal language and portions of the informal cognitive assessments  Refer to SLP Rehab note for full details  QI: Comprehension 2  Sometimes Understands: Understands only basic conversations or simple, direct phrases  Frequently requires cues to understand   Comprehension (FIM) 3 - Understands basic info/conversation 50-74% of time  (BUT min A for written comprehension)   Expression   Verbal Basic   Non-Verbal Basic   Intelligibility Word   Findings Pt completing the informal language and portions of the informal cognitive assessments  Refer to SLP Rehab note for full details  QI: Expression 2  Frequently exhibits difficulty with expressing needs and ideas   Expression (FIM) 3 - Expresses basic info/needs 50-74% of time   Social Interaction   Cooperation with staff   Participation Individual   Behaviors observed Appropriate   Findings Pt completing the informal language and portions of the informal cognitive assessments  Refer to SLP Rehab note for full details  Social Interaction (FIM) 5 - Interacts appropriately with others 90% of time   Problem Solving   Complex Manages finances   Routine Manages call bell   Findings Pt completing the informal language and portions of the informal cognitive assessments  Refer to SLP Rehab note for full details     Problem solving (FIM) 3 - Solves basic problmes 50-74% of time   Memory   Recognize People Yes  (family members)   Remember Routine No   Initiates Tasks No   Short-Term Impaired   Long Term Intact   Recalls Precaution No   Findings Pt completing the informal language and portions of the informal cognitive assessments  Refer to SLP Rehab note for full details  Memory (FIM) 3 - Recognizes, recalls/performs 50-74%   Speech/Language/Cognition Assessmetn   Treatment Assessment Pt completing the Informal Language Assessment  Pt was able to answer the following orientation information: Name:+; Date: + for full date; Place: + place/city; Situation: partial-->pt able to recall the initial fall off ladder, but not state about fall down stairs  Ability to answer LTM Biographical Information is as follows: : + ; Age: + ; Marital Status/Name of Significant Other: + wife, Ike Dials; Children/Names: + all 3 children; Address: + ; Prior IADL's completed: Pt reporting to SLP about how wife completes cooking, cleaning, laundry  He still works on a [employer], was driving, completes finances and stated NOT taking any medications prior to admission  Auditory Comprehension Tasks:  Simple Yes/No Questions: 9/10 accurate; Moderate Yes/No Questions: 5/5 accurate; Written Commands: 1-step: 3/3 accurate; 2-step: 3/3 accurate; 3-step: 1/3 accurate; Verbal Expression Tasks: Automatic Speech: Counting: 10/10 ; ALEM: 7/ ; TANK: 10/12 but able to self correct missing one month, but not able to recognize missing the one following; Responsive Naming: 10/10 accurate; Confrontation Naming: 10-10 accuracy given items in room and body parts; Spontaneous Speech/Language: Tell me about this picture (Cookie Theft): Pt only providing description given 2 aspects "sink overflowing" and "falling off the ladder " When prompted by SLP to elicit any further events or information observed in picture, pt unable to verbalize any further information needing moderate cues to elicit other things in the picture   Overall pt is demonstrating minimal deficits noted in overall verbal comprehension given significant [place] deficit to which pocket talker was initially used but verbal presentation given information was decreased in pt's ability to respond accurately or appropriately  SLP did utilize written expression for all structured question and tasks presented as result of hearing deficit  Pt did appear to have fairly good comprehension given information BUT noted to have slower processing when answering multiple questions  As for pt's speech output, pt noted to gesture numbers and verbalize short 1-2 word answers  Pt did stat to SLP about how he feels his "speech" is not good despite pt's ability to complete object naming, responsive naming, automatic speech tasks w/o difficulty  When attempting to have pt verbalize longer utterances, that is when it was noted that pt did demonstrate slower speech output, prolongation given sounds for words, etc  Spoke w/ pt's wife at the end of session, providing update in regards to assessment, to which she does report that they tend to use their own "sign language" to communicate at home as pt's hearing has worsened over the years  SLP providing update in adequate comprehension skills but slower processing noted when answering questions, etc  Additionally, stated to wife that while LTM recall was functional, suspect there will be decreased ST memory and difficulty in completing new learning tasks, skills which will be introduced while in the acute rehab center  Currently, pt will benefit from SLP services at this time to maximize overall cognitive linguistic skills to decrease overall caregiver burden by time of discharge      SLP Therapy Minutes   SLP Time In 0900   SLP Time Out 1000   SLP Total Time (minutes) 60   SLP Mode of treatment - Individual (minutes) 60   SLP Mode of treatment - Concurrent (minutes) 0   SLP Mode of treatment - Group (minutes) 0   SLP Mode of treatment - Co-treat (minutes) 0   SLP Mode of Treatment - Total time(minutes) 60 minutes   SLP Cumulative Minutes 60   Therapy Time missed   Time missed?  No

## 2022-06-07 NOTE — PROGRESS NOTES
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   Date of Onset 04/12/22   Support System   Name [address]   Relationship wife, daughter   Home Setup   Type of Home Multi Level   Method of Entry Stairs   Number of Stairs 1   First Floor Bathroom Half   First Floor Setup Available Yes  (per chart)   Available Equipment Single Point Cane   Baseline Information   Prior Device(s) Used   (no AD per chart)   Prior IADL Participation   Meal Preparation   (per chart he and wife complete)   Laundry   (per chart he and wife complete)   Home Cleaning   (per chart he and wife complete)   Prior Level of Function   Self-Care 3  Independent - Patient completed the activities by him/herself, with or without an assistive device, with no assistance from a helper  Indoor-Mobility (Ambulation) 3  Independent - Patient completed the activities by him/herself, with or without an assistive device, with no assistance from a helper  Stairs 3  Independent - Patient completed the activities by him/herself, with or without an assistive device, with no assistance from a helper  Functional Cognition 3  Independent - Patient completed the activities by him/herself, with or without an assistive device, with no assistance from a helper  Prior Device Used   (no AD per chart)   Falls in the Last Year   Number of falls in the past 12 months 2   Type of Injury Associated with Fall Major injury  (current hospitalization)   Patient Preference   Nickname (Patient Preference) Shelia Quinn   Psychosocial   Psychosocial (WDL) X   Patient Behaviors/Mood Cooperative;Flat affect   Restrictions/Precautions   Precautions Bed/chair alarms;Cognitive; Fall Risk;Hard of hearing;Seizure;Supervision on toilet/commode  (IV RUE)   Weight Bearing Restrictions No   ROM Restrictions No   Pain Assessment   Pain Assessment Tool 0-10   Pain Score No Pain   Eating Assessment   Type of Assistance Needed Set-up / clean-up   Physical Assistance Level No physical assistance   Comment seated in recliner, seeks assistance to open containers, cut food   Eating CARE Score 5   Oral Hygiene   Type of Assistance Needed Physical assistance   Physical Assistance Level 26%-50%   Comment completed seated though anticipate would require assistance to steady in stance based on functional performance  Pt requiring prompting during task to apply toothpaste to toothbrush, let dentures soak prior to putting them back in mouth   Oral Hygiene CARE Score 3   Grooming   Able To Wash/Dry Hands; Wash/Dry Face;Brush/Clean Teeth;Comb/Brush Hair   Findings vc's for initiation, sequencing, safety; assistance to brush hair, increased time for tasks   Tub/Shower Transfer   Reason Not Assessed (S)  Sponge Bath;Safety; Medical  (pt with shower orders however SB completed today 2* pt orthostatic and symptomatic)   Shower/Bathe Self   Type of Assistance Needed Physical assistance   Physical Assistance Level 26%-50%   Comment assistance for feet, buttocks; modA to steady in stance   Shower/Bathe Self CARE Score 3   Dressing/Undressing Clothing   Remove UB Clothes   (gown)   Don UB Clothes Pullover Shirt   Type of Assistance Needed Physical assistance   Physical Assistance Level 25% or less   Comment Brayan to pull shirt down over trunk   Upper Body Dressing CARE Score 3   [address] Undergarment;Pants;Socks   Type of Assistance Needed Physical assistance   Physical Assistance Level 51%-75%   Comment maxA with assistance to thread BLE and fully hike pants in stance; min/modA to steady in stance   Lower Body Dressing CARE Score 2   Putting On/Taking Off Footwear   Type of Assistance Needed Physical assistance   Physical Assistance Level 26%-50%   Comment able to doff socks, requiring TA to don socks seated EOB   Putting On/Taking Off Footwear CARE Score 3   Toileting Hygiene   Type of Assistance Needed Physical assistance   Physical Assistance Level 76% or more   Comment maxA to fully pull up/down pants; pt only urinating minimal amt but anticipate pt would require assistance with rear hygiene at this time  Pt requires Brayan to steady in stance   Toileting Hygiene CARE Score 2   Toilet Transfer   Type of Assistance Needed Physical assistance   Physical Assistance Level Total assistance   Comment ModAx1 WC to toilet with SBA of another for safety 2* orthostatic   Toilet Transfer CARE Score 1   Transfer Bed/Chair/Wheelchair   Type of Assistance Needed Physical assistance   Physical Assistance Level 51%-75%   Comment SPT EOB<->WC without AD   Chair/Bed-to-Chair Transfer CARE Score 2   Sit to Stand   Type of Assistance Needed Physical assistance   Physical Assistance Level 51%-75%   Comment without AD   Sit to Stand CARE Score 2   Comprehension   QI: Comprehension 2  Sometimes Understands: Understands only basic conversations or simple, direct phrases  Frequently requires cues to understand   Expression   QI: Expression 2  Frequently exhibits difficulty with expressing needs and ideas   RUE Assessment   RUE Assessment WFL  (for ADL completion; difficulty formally assessing 2* difficulty following commands)   LUE Assessment   LUE Assessment WFL  (for ADL completion; difficulty formally assessing 2* difficulty following commands)   Coordination   Movements are Fluid and Coordinated 0   Coordination and Movement Description impaired GMC, FMC noted during ADLs, dysmetria BUE; TBA further formally as able   Sensation   Light Touch   (denies numbness BUE; TBA further as able)   Cognition   Overall Cognitive Status Impaired   Arousal/Participation Alert; Cooperative   Attention Attends with cues to redirect   Orientation Level Oriented to person;Oriented to place;Oriented to time   Memory Decreased recall of biographical information;Decreased recall of precautions  (pt reporting birthday tomorrow [birth year], though per chart birthday [birth year])   Following Commands Follows one step commands with increased time or repetition   Comments very [place], difficulty understanding questions/cues with use of pocket talker as well   Perception   Inattention/Neglect   (cues to wash RUE; TBA further)   Discharge Information   Patient's Discharge Plan Return home with family support   Patient's Rehab Expectations "go home"   Barriers to Discharge Home Decreased Cognitive Function;Decreased Strength;Decreased Endurance; Safety Considerations   Impressions 79 y o  male with PMH including hearing impairment, arthritis and hyperlipidemia and history of a recent fall off of a ladder end of March 2022, impacted his head, no LOC  Pt did not go to the ER  On 4/12/2022 patient fell down several stairs when his left knee gave out, hitting his forehead sustaining a laceration  Pt found to have L SDH which was being monitored by neurosx with serial CTH however patient's recent OP CTH showed expansion of L SDH with increasing midline shift therefore patient underwent L craniotomy for SDH evacuation on 5/28 by Dr Eriberto LEE MMA embolization was attempted as well however was aborted due to anatomic variation that would not allow for the procedure  Pt lives with his wife in a house with 1STE  Per chart, 1st floor setup is available upon d/c with half bath on first floor  Pt with difficulty answering questions about home setup and PLOF ([place] vs cog), and recommend f/u with family  Per chart pt owns [facility] but was not using any AD PTA  Upon initial evaluation, pt with orthostatic BP and symptomatic reporting dizziness upon sitting and standing; RN made aware  Pt completing functional transfers with ModAx1 without AD, improving to Brayan using RW  Pt requiring Brayan with UB ADLs and maxA with LB ADLs   Pt shaking head "no" to majority of OT questions, though question to what degree [place] vs  Cog  OT using pocket talker throughout session though pt still with difficulty hearing OT and often telling OT "take this off " Pt presenting with impaired standing balance, activity tolerance, motor control, coordination, functional strength and cognition and communication that are impacting his ability to complete B/IADLs and functional transfers/mobility independently and safely  Pt would benefit from 3 week ELOS to achieve SUP/mod(I) goals     OT Therapy Minutes   OT Time In 0700   OT Time Out 0830   OT Total Time (minutes) 90   OT Mode of treatment - Individual (minutes) 90   OT Mode of treatment - Concurrent (minutes) 0   OT Mode of treatment - Group (minutes) 0   OT Mode of treatment - Co-treat (minutes) 0   OT Mode of Treatment - Total time(minutes) 90 minutes   OT Cumulative Minutes 90   Cumulative Minutes   Cumulative therapy minutes 90

## 2022-06-07 NOTE — CASE MANAGEMENT
Cm met with pt and his wife to review rehab routine and cm role  He lives with his wife in a two story home  He stays on the first floor with a first floor bedroom and bathroom with a walk in shower  No DME is available for use  He has had outpt therapy for carpal tunnel at [facility]  He uses [address] in [facility]  Reviewed team meeting process and potential LOS  Following to assist w/ d/c planning needs

## 2022-06-07 NOTE — PLAN OF CARE
Problem: PAIN - ADULT  Goal: Verbalizes/displays adequate comfort level or baseline comfort level  Description: Interventions:  - Encourage patient to monitor pain and request assistance  - Assess pain using appropriate pain scale  - Administer analgesics based on type and severity of pain and evaluate response  - Implement non-pharmacological measures as appropriate and evaluate response  - Consider cultural and social influences on pain and pain management  - Notify physician/advanced practitioner if interventions unsuccessful or patient reports new pain  Outcome: Progressing     Problem: INFECTION - ADULT  Goal: Absence or prevention of progression during hospitalization  Description: INTERVENTIONS:  - Assess and monitor for signs and symptoms of infection  - Monitor lab/diagnostic results  - Monitor all insertion sites, i e  indwelling lines, tubes, and drains  - Monitor endotracheal if appropriate and nasal secretions for changes in amount and color  - [place] appropriate cooling/warming therapies per order  - Administer medications as ordered  - Instruct and encourage patient and family to use good hand hygiene technique  - Identify and instruct in appropriate isolation precautions for identified infection/condition  Outcome: Progressing  Goal: Absence of fever/infection during neutropenic period  Description: INTERVENTIONS:  - Monitor WBC    Outcome: Progressing     Problem: SAFETY ADULT  Goal: Patient will remain free of falls  Description: INTERVENTIONS:  - Educate patient/family on patient safety including physical limitations  - Instruct patient to call for assistance with activity   - Consult OT/PT to assist with strengthening/mobility   - Keep Call bell within reach  - Keep bed low and locked with side rails adjusted as appropriate  - Keep care items and personal belongings within reach  - Initiate and maintain comfort rounds  - Make Fall Risk Sign visible to staff  - Offer Toileting every 2 Hours, in advance of need  - Initiate/Maintain bed/chair alarm  - Obtain necessary fall risk management equipment: nonskid socks  - Apply yellow socks and bracelet for high fall risk patients  - Consider moving patient to room near nurses station  Outcome: Progressing  Goal: Maintain or return to baseline ADL function  Description: INTERVENTIONS:  -  Assess patient's ability to carry out ADLs; assess patient's baseline for ADL function and identify physical deficits which impact ability to perform ADLs (bathing, care of mouth/teeth, toileting, grooming, dressing, etc )  - Assess/evaluate cause of self-care deficits   - Assess range of motion  - Assess patient's mobility; develop plan if impaired  - Assess patient's need for assistive devices and provide as appropriate  - Encourage maximum independence but intervene and supervise when necessary  - Involve family in performance of ADLs  - Assess for home care needs following discharge   - Consider OT consult to assist with ADL evaluation and planning for discharge  - Provide patient education as appropriate  Outcome: Progressing  Goal: Maintains/Returns to pre admission functional level  Description: INTERVENTIONS:  - Perform BMAT or MOVE assessment daily    - Set and communicate daily mobility goal to care team and patient/family/caregiver  - Collaborate with rehabilitation services on mobility goals if consulted  - Perform Range of Motion 3 times a day  - Reposition patient every 2 hours    - Dangle patient 3 times a day  - Stand patient 3 times a day  - Ambulate patient 3 times a day  - Out of bed to chair 3 times a day   - Out of bed for meals 3 times a day  - Out of bed for toileting  - Record patient progress and toleration of activity level   Outcome: Progressing     Problem: DISCHARGE PLANNING  Goal: Discharge to home or other facility with appropriate resources  Description: INTERVENTIONS:  - Identify barriers to discharge w/patient and caregiver  - Arrange for needed discharge resources and transportation as appropriate  - Identify discharge learning needs (meds, wound care, etc )  - Arrange for interpretive services to assist at discharge as needed  - Refer to Case Management Department for coordinating discharge planning if the patient needs post-hospital services based on physician/advanced practitioner order or complex needs related to functional status, cognitive ability, or social support system  Outcome: Progressing     Problem: Prexisting or High Potential for Compromised Skin Integrity  Goal: Skin integrity is maintained or improved  Description: INTERVENTIONS:  - Identify patients at risk for skin breakdown  - Assess and monitor skin integrity  - Assess and monitor nutrition and hydration status  - Monitor labs   - Assess for incontinence   - Turn and reposition patient  - Assist with mobility/ambulation  - Relieve pressure over bony prominences  - Avoid friction and shearing  - Provide appropriate hygiene as needed including keeping skin clean and dry  - Evaluate need for skin moisturizer/barrier cream  - Collaborate with interdisciplinary team   - Patient/family teaching  - Consider wound care consult   Outcome: Progressing     Problem: Potential for Falls  Goal: Patient will remain free of falls  Description: INTERVENTIONS:  - Educate patient/family on patient safety including physical limitations  - Instruct patient to call for assistance with activity   - Consult OT/PT to assist with strengthening/mobility   - Keep Call bell within reach  - Keep bed low and locked with side rails adjusted as appropriate  - Keep care items and personal belongings within reach  - Initiate and maintain comfort rounds  - Make Fall Risk Sign visible to staff  - Offer Toileting every 2 Hours, in advance of need  - Initiate/Maintain bed/chair alarm  - Obtain necessary fall risk management equipment: nonskid socks  - Apply yellow socks and bracelet for high fall risk patients  - Consider moving patient to room near nurses station  Outcome: Progressing

## 2022-06-07 NOTE — PROGRESS NOTES
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   Date of Onset 04/12/22   Support System   Name Pt lives w/ wife Jaswinder Ochoa   Prior Level of Function   Functional Cognition 3  Independent - Patient completed the activities by him/herself, with or without an assistive device, with no assistance from a helper  Prior Assistance Needed for Meal Preparation;Household Chores/Cleaning   Patient Preference   Nickname (Patient Preference) Verl [place]   Restrictions/Precautions   Precautions Bed/chair alarms;Cognitive; Fall Risk;Hard of hearing;Seizure;Supervision on toilet/commode   Pain Assessment   Pain Assessment Tool 0-10   Comprehension   Assist Devices Amp Headset; Other  (use of written/reading skills for most structured questions)   Auditory Basic   Visual Basic   Findings Pt completing the informal language and portions of the informal cognitive assessments  Refer to SLP Rehab note for full details  QI: Comprehension 2  Sometimes Understands: Understands only basic conversations or simple, direct phrases  Frequently requires cues to understand   Comprehension (FIM) 3 - Understands basic info/conversation 50-74% of time  (BUT min A for written comprehension)   Expression   Verbal Basic   Non-Verbal Basic   Intelligibility Word   Findings Pt completing the informal language and portions of the informal cognitive assessments  Refer to SLP Rehab note for full details  QI: Expression 2  Frequently exhibits difficulty with expressing needs and ideas   Expression (FIM) 3 - Expresses basic info/needs 50-74% of time   Social Interaction   Cooperation with staff   Participation Individual   Behaviors observed Appropriate   Findings Pt completing the informal language and portions of the informal cognitive assessments   Refer to SLP Rehab note for full details  Social Interaction (FIM) 5 - Interacts appropriately with others 90% of time   Problem Solving   Complex Manages finances   Routine Manages call bell   Findings Pt completing the informal language and portions of the informal cognitive assessments  Refer to SLP Rehab note for full details  Problem solving (FIM) 3 - Solves basic problmes 50-74% of time   Memory   Recognize People Yes  (family members)   Remember Routine No   Initiates Tasks No   Short-Term Impaired   Long Term Intact   Recalls Precaution No   Findings Pt completing the informal language and portions of the informal cognitive assessments  Refer to SLP Rehab note for full details  Memory (FIM) 3 - Recognizes, recalls/performs 50-74%   Discharge Information   Vocational Plan Full Time   Barriers to Return to [employer]; Endurance;Communication   Patient's Discharge Plan home w/ family support   Patient's Rehab Expectations verbalized nothing at time of evaluation   Barriers to Discharge Home Decreased Cognitive Function;Decreased Strength;Decreased Endurance; Safety Considerations   Impressions Pt is a good rehab candidate to achieve supervision to min A level for cognitive linguistic skills while in the acute rehab center w/ anticipated discharge home w/ family support/supervision  Current barriers which present include pt's [place] status which currently pt benefits from written information for FULL processing, decreased ST/working memory, decreased new learning in regards to executive function skills, some difficulty in verbal expression more so in longer utterances, and decreased insight to overall deficits which currently impact pt's safety and functional mobility  ELOS ~2-3 weeks  At this time,  pt will benefit from SLP services at this time to maximize overall cognitive linguistic skills to decrease overall caregiver burden by time of discharge     SLP Therapy Minutes   SLP Time In 0900   SLP Time Out 1000   SLP Total Time (minutes) 60   SLP Mode of treatment - Individual (minutes) 60   SLP Mode of treatment - Concurrent (minutes) 0   SLP Mode of treatment - Group (minutes) 0   SLP Mode of treatment - Co-treat (minutes) 0   SLP Mode of Treatment - Total time(minutes) 60 minutes   SLP Cumulative Minutes 60   Cumulative Minutes   Cumulative therapy minutes 150

## 2022-06-07 NOTE — OCCUPATIONAL THERAPY NOTE
Occupational Therapy Treatment Note:      06/05/22 1005   OT Last Visit   OT Visit Date 06/05/22   Note Type   Note Type Treatment   Restrictions/Precautions   Weight Bearing Precautions Per Order No   Other Precautions Cognitive; Chair Alarm; Bed Alarm; Fall Risk;Hard of hearing   Pain Assessment   Pain Assessment Tool 0-10   Pain Score No Pain   ADL   Where Assessed Chair   Grooming Assistance 5  Supervision/Setup   UB Bathing Assistance 4  Minimal Assistance   UB Bathing Deficit   (asst required to ready wash cloth and place into r hand  pt with slowed and weak movement r ue/le)   LB Bathing Assistance 3  Moderate Assistance   LB Bathing Deficit Verbal cueing; Increased time to complete;Left lower leg including foot;Right lower leg including foot; Buttocks; Perineal area   UB Dressing Assistance 4  Minimal Assistance   UB Dressing Comments asst to open orient shirt / sleeves, pull down in back   LB Dressing Assistance 3  Moderate Assistance   LB Dressing Comments asst for pant threading min asst clothing management mod asst and asst to balance  max asst for b socks   Toileting Assistance  3  Moderate Assistance   Toileting Comments asst for balance, hygiene and clothing management   Functional Standing Tolerance   Time initially poor + balance improving to fair minus with functional mobility and adls  pt did tolerate standing 1 min for hygiene c rw   Transfers   Sit to Stand 3  Moderate assistance   Additional items Assist x 1  (tactile cues secondary to pt being very [place]  he is also slow to follow and process directions    pt needed asst to position and palce r le prior to stands especially when fatigued )   Stand to Sit 3  Moderate assistance   Functional Mobility   Functional Mobility 3  Moderate assistance  (mod asst improving to min a as tx progressed )   Additional Comments   (improving ability to steer own walker while engaging in functional mobility / arm chair transfer)   Additional items Rolling walker Cognition   Overall Cognitive Status Impaired   Arousal/Participation Alert; Cooperative   Attention Attends with cues to redirect   Memory Decreased recall of precautions   Following Commands Follows one step commands with increased time or repetition   Comments pt very [place] needing gestures, lip reading and at times tactile / hand over hand asst secondary to r ue/le innatention/ weakness pt was bright and initiated more conversation this session  pt sates it is his wifes b day tomorrow and was able to id her age via fingers (self initiated)   Activity Tolerance   Activity Tolerance Patient tolerated treatment well   Assessment   Assessment pt particiapted in am ot session and was seen focusing on adl's seated in chair after set up  pt is limited by cognition/ r sided awareness, r ue and le weakness, decrased balance and is very [place]  pt toelrated session well and was far more talkative and animated this session  pt requires min to mod asst ub adls and mod to max asst lb with limiting factors as listed  pt spoke highly of his wife and that it was her birthday soon  pt was noted to demonstrate imrpovement in balance and bobility status as session progressed  pt was initially being asstisted by 2 staff upon ot arrival for trnasfer from [facility]  pt was noted to require r le repositioning prior to standing  will follow focusing on goals from ie  Plan   Treatment Interventions ADL retraining;Visual perceptual retraining;Functional transfer training;UE strengthening/ROM; Endurance training;Cognitive reorientation;Patient/family training;Equipment evaluation/education; Neuromuscular reeducation; Fine motor coordination activities; Compensatory technique education; Activityengagement   Goal Expiration Date 06/12/22   OT Treatment Day 2   OT Frequency 3-5x/wk   Recommendation   OT Discharge Recommendation Post acute rehabilitation services   OT - OK to Discharge Yes   Additional Comments    (late note entry secondary to forgotten documentation)   AM-PAC Daily Activity Inpatient   Lower Body Dressing 2   Bathing 2   Toileting 2   Upper Body Dressing 3   Grooming 3   Eating 3   Daily Activity Raw Score 15   Daily Activity Standardized Score (Calc for Raw Score >=11) 34 69   AM-PAC Applied Cognition Inpatient   Following a Speech/Presentation 2   Understanding Ordinary Conversation 3   Taking Medications 2   Remembering Where Things Are Placed or Put Away 2   Remembering List of 4-5 Errands 2   Taking Care of Complicated Tasks 2   Applied Cognition Raw Score 13   Applied Cognition Standardized Score 30 46   April A SEAN Hernandez

## 2022-06-07 NOTE — PLAN OF CARE
Initial SW/CM Assessment/Plan of Care Note     Baseline Assessment/Plan:  SW received consult to assist with establishing a medical decision maker.  Pt is a 60 year old male who presents to the emergency department here for abdominal pain.  Writer is familiar with pt from his admission at [facility] last month.    Met with pt and sister Marivel Torres ([phone]) today, explained role and reason for visit.  Communication done via  phone as pt is [place] speaking.  Pt is alert and oriented to self only.  Attempted to educate pt on the purpose of advanced directives, pt is not able to understand the concept of the document, therefore we cannot complete POA/HC. If a procedure is needed, family consensus is needed.  This was done during that last hospitalization.  Family cooperative with this process.    Updated medical team.  SW will remain available through discharge.  Patient’s Primary Care Provider is Mariann Dupree MD.   H. Blumberg MSW, LCSW [phone]    Medical History  Past Medical History:   Diagnosis Date   • High cholesterol    • Malignant neoplasm        Prior to Admission Status  Functional Status  Ambulation: Independent/Self  Bathing: Family  Dressing: Family  Toileting: Independent/Self  Meal Preparation: Family  Shopping: Family  Medication Administration: Family  Housekeeping: Family  Laundry: Family  Transportation: Family    Agency/Support  Type of Services Prior to Hospitalization: None  Support Systems:    Home Devices/Equipment:    Mobility Assist Devices: None  Sensory Support Devices: None    Current Status  PT Ambulation Tips:    PT Transfer Tips:    OT Bathing Tips:    OT Dressing Tips:    OT Toileting Tips:    OT Feeding Tips:    SLP Swallow/Feeding Tips:    SLP Comm/Cog Tips:    Current Mental Status: Cooperative  Stressors: Health Status    Insurance  Primary: MEDICAID [id number]  Secondary: N/A    Barriers to Discharge  Identified Barriers to Discharge/Transition Planning:  Problem: OCCUPATIONAL THERAPY ADULT  Goal: Performs self-care activities at highest level of function for planned discharge setting  See evaluation for individualized goals  Description: Treatment Interventions: ADL retraining, Functional transfer training, UE strengthening/ROM, Endurance training, Visual perceptual retraining, Cognitive reorientation, Patient/family training, Equipment evaluation/education, Neuromuscular reeducation, Fine motor coordination activities, Compensatory technique education, Activityengagement          See flowsheet documentation for full assessment, interventions and recommendations     6/7/2022 8515 by SEAN Stubbs  Outcome: Progressing  Note: Limitation: Decreased ADL status, Decreased UE ROM, Decreased endurance, Decreased sensation, Visual deficit, Decreased fine motor control, Decreased self-care trans, Decreased high-level ADLs  Prognosis: Good  Assessment: Pt seen for OT session focusing on self care, dynamic balance/safety and cognitive skills as well as functional use of BUE for adl tasks - pt set up in chair for AM care -required cues to initiate tasks - able to complete UB tasks with SBA and min cues, min a to bathe LB, mod a to dress LB - continues with R sided weakness/incoordination but improved from prior sessions - pt does verbalize that he has carpal tunnel on RUE and is limited with coordination/dexterity 2* same - transfers with min a - pt continues to progress towards goals as stated on eval - would continue to benefit from inpt rehab when medically cleared - OT to continue to follow to address goals as stated on initial eval  Recommendation: Physiatry Consult  OT Discharge Recommendation: Post acute rehabilitation services  OT - OK to Discharge: Yes    6/7/2022 0829 by SEAN Stubbs  Outcome: Progressing  Note: Limitation: Decreased ADL status, Decreased UE ROM, Decreased endurance, Decreased sensation, Visual deficit, Decreased fine motor control, Assessment/stabilization in progress    Plan  /CM - Recommendations for Discharge:  Home with family  PT - Recommendations for Discharge:    OT - Recommendations for Discharge:    SLP - Recommendations for Discharge:    Anticipate patient will need post-hospital services. Necessary services are available.  Anticipate patient can return to the environment from which patient entered the hospital.   Anticipate patient cannot provide self-care at discharge.    Refer to /CM Flowsheet for Goals and objective data.      Decreased self-care trans, Decreased high-level ADLs  Prognosis: Good  Assessment: Pt seen for OT session focusing on self care, dynamic balance/safety and cognitive skills as well as functional use of BUE for adl tasks - pt set up in chair for AM care -required cues to initiate tasks - able to complete UB tasks with SBA and min cues, min a to bathe LB, mod a to dress LB - continues with R sided weakness/incoordination but improved from prior sessions - pt does verbalize that he has carpal tunnel on RUE and is limited with coordination/dexterity 2* same - transfers with min a - pt continues to progress towards goals as stated on eval - would continue to benefit from inpt rehab when medically cleared - OT to continue to follow to address goals as stated on initial eval  Recommendation: Physiatry Consult  OT Discharge Recommendation: Post acute rehabilitation services  OT - OK to Discharge:  Yes     April A SEAN Hernandez

## 2022-06-07 NOTE — PCC CARE MANAGEMENT
6/14/22    He lives with his wife in a two story home  He stays on the first floor with a first floor bedroom and bathroom with a walk in shower  No DME is available for use  He has had outpt therapy for carpal tunnel at [facility]  He uses [address] [place]  Reviewed team meeting process and potential LOS  Following to assist w/ d/c planning needs  6/21/22  Pt is scheduled for d/c today with outpt PT and OT through [facility]  Following to assist w/ d/c planning needs

## 2022-06-07 NOTE — MALNUTRITION/BMI
This medical record reflects one or more clinical indicators suggestive of malnutrition     Malnutrition Findings:   Adult Malnutrition type: Acute illness  Adult Degree of Malnutrition: Malnutrition of moderate degree  Malnutrition Characteristics: Weight loss, Fat loss       360 Statement: Related to medical condition as evidenced by 5% weight loss over the past 3 weeks and mild depletion of body fat (Orbitals) treated with diet and oral nutrition supplements        See Nutrition note dated 6/7/22 for additional details  Completed nutrition assessment is viewable in the nutrition documentation

## 2022-06-07 NOTE — NURSING NOTE
OT obtained orthostatic blood pressures prior to ADL  Standing BP: 115/61, Sitting BP: 105/70, Standing BP: 82/55 with c/o dizziness   Will continue to monitor and discuss in teams this AM

## 2022-06-07 NOTE — TEAM CONFERENCE
Acute RehabilitationTeam Conference Note  Date: 6/7/2022   Time: 10:49 AM       Patient Name:  Lowell Schrader       Medical Record Number: [id number]   YOB: 1940  Sex: Male          Room/Bed:  /[facility] 459-01  Payor Info:  Payor: MEDICARE / Plan: MEDICARE A AND B / Product Type: Medicare A & B Fee for Service /      Admitting Diagnosis: Acute on chronic intracranial subdural hematoma ([facility] [address] ) [I62 01, I62 03]   Admit Date/Time:  6/6/2022  2:13 PM  Admission Comments: No comment available     Primary Diagnosis:  SDH (subdural hematoma) ([facility])  Principal Problem: SDH (subdural hematoma) ([facility] [address] )    Patient Active Problem List    Diagnosis Date Noted    Constipation 06/02/2022    Urinary retention 06/02/2022    SDH (subdural hematoma) ([facility] [address] ) 05/27/2022    Primary osteoarthritis of left knee 05/17/2022    Hygroma 04/26/2022    Right hand pain     Carpal tunnel syndrome on right     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[facility] [address] ) 09/02/2021    Overweight (BMI 25 0-29 9) 01/27/2021    Negative depression screening 09/26/2019    Medicare annual wellness visit, subsequent 03/20/2019    Hypercholesterolemia 07/12/2018    Borderline hypertension 07/12/2018    Hearing loss 04/08/2009       Physical Therapy:         6/7/22  PT to evaluate pt at 1pm today  Occupational Therapy:          No notes on file    Speech Therapy:           ST orders received with plans to complete cognitive linguistic/language evaluation on 6/7/22  Evaluation results and recommendations to follow  Nursing Notes:  Appetite: Good  Diet Type: Regular/House                      Diet Patient/Family Education Complete:  Yes                            Bladder: Continent     Bladder Patient/Family Education: Yes  Bowel: Continent     Bowel Patient/Family Education: Yes  Pain Location/Orientation: Orientation: Bilateral, Location: Head  Pain Score: 0                       Hospital Pain Intervention(s): Medication (See MAR)  Pain Patient/Family Education: Yes  Medication Management/Safety  Injectable:  (heparin)  Safe Administration: Yes (by staff)  Medication Patient/Family Education Complete: No (on going)    SDH s/p Lt craniotomy - Monitor incisions  , Follow-up with Neurosurgery in 2 weeks, Keppra for 7 days for seizure prophylaxis  Hyperlipidemia - Continue statin, Low cholesterol diet  Pt is continent of both bowel and bladder  Pt requires alarms for safety  This week we will monitor lab values and vital signs  We will educate on the importance of turning and offloading in order to prevent skin breakdown and preform routine skin checks  We will encourage independence with ADLs  We will preform safe transfers and keep the pt free from falls  We will monitor for constipation and medicate per bowel protocol  Case Management:     Discharge Planning  Living Arrangements: Lives w/ Spouse/significant other  Support Systems: Spouse/significant other, Children  Assistance Needed: n/a  Type of Current Residence: Private residence  [address]: No  6/7/22  Patient was admitted yesterday  CM open will be complete later today  Is the patient actively participating in therapies? yes  List any modifications to the treatment plan:     Barriers Interventions   Hearing impairments  Whiteboard, pocket talker   Difficulty communicating  Therapy exercises   Slow processing Therapy exercises             Is the patient making expected progress toward goals?  YES  List any update or changes to goals:     Medical Goals: Patient will be medically stable for discharge to [facility] upon completion of rehab program and Patient will be able to manage medical conditions and comorbid conditions with medications and follow up upon completion of rehab program    Weekly Team Goals:   Rehab Team Goals  Bowel/Bladder Team Goal: Patient will require assist with bladder/bowel management with least restrictive device upon completion of rehab program    Discussion: Pt presents with the above barriers  He is functioning is min for UB ADLs and max for LB ADLs and he is orthostatic and symptomatic  He is min a for cognition  PT is still to evaluate  ELOS 2-3 weeks  Anticipated Discharge Date:  [facility] Team Members Present: The following team members are supervising care for this patient and were present during this Weekly Team Conference      Physician: Dr Suellen Phillips MD  : LENCHO Tam  Registered Nurse: Mau Garcia RN  Physical Therapist: Cindy Malave DPT  Occupational Therapist: Bandar Triana OTR/L  Speech Therapist: Alek Ceballos, [address] [place] [address]

## 2022-06-07 NOTE — PROGRESS NOTES
OT Long-Term Goals       06/07/22 0700   Rehab Team Goals   ADL Team Goal Patient will require supervision with ADLs with least restrictive device upon completion of rehab program   Rehab Team Interventions   OT Interventions Self Care;Home Management; Therapeutic Exercise;Community Reintegration;Cognitive Reintegration;Cognitive Retraining;Energy Conservation;Patient/Family Education   Eating Goal   Eating Goal 06  Independent - Patient completes the activity by him/herself with no assistance from a helper  Status Ongoing; Target goal - three weeks   Interventions Optimal Position; Neuromuscular Education; Compensation Strategies   Grooming Goal   Oral Hygiene Goal 06  Independent - Patient completes the activity by him/herself with no assistance from a helper  Task Wash/Dry Face;Wash/Dry Hands;Brush Teeth;Comb Hair   Status Ongoing; Target goal - three weeks   Intervention Balance Work;Neuromuscular Education; Therapeutic Exercise; Tolerance Work   Tub/Shower Transfer Goal   Method Shower Stall  (SUP - OT to clarify home environment/setup with family re: transfer surface)   Assist Device   (LRAD)   Status Ongoing; Target goal - three weeks   Interventions ADL Training;Assistive Device; Neuromuscular Education   Bathing Goal   Shower/bathe self Goal 04  Supervision or touching assistance- [place] provides VERBAL CUES or supervision throughout activity  Environment Seated;Standing   Status Ongoing; Target goal - three weeks   Intervention ADL Training;Assistive Device; Neuromuscular Education; Therapeutic Exercise   Upper Body Dressing Goal   Upper body dressing Goal 06  Independent - Patient completes the activity by him/herself with no assistance from a helper  Task Upper Body;Arms in/out; Over Head   Environment Seated   Status Ongoing; Target goal - three weeks   Intervention Balance Work;Neuromuscular Education; Therapeutic Exercise; Tolerance Work   Lower Body Dressing Goal   Lower body dressing Goal 05   Setup or clean-up assistance - [place] SETS UP or CLEANS UP, patient completes activity  [place] assists only prior to or following the activity  Putting on/taking off footwear Goal 06  Independent - Patient completes the activity by him/herself with no assistance from a helper  Task Lower Body;Socks;Pants; Undergarment   Environment Seated;Standing   Status Ongoing; Target goal - three weeks   Intervention Assistive Device;Balance Work;Neuromuscular Education; Therapeutic Exercise; Tolerance Work   Toileting Transfer Goal   Toilet transfer Goal 06  Independent - Patient completes the activity by him/herself with no assistance from a helper  Assistive Device   (LRAD)   Status Ongoing; Target goal - two weeks   Intervention ADL Training;Balance Work;Assistive Device   Toileting Goal   Toileting hygiene Goal 06  Independent - Patient completes the activity by him/herself with no assistance from a helper  Task Pants Up;Pants Down;Hygiene   Status Ongoing; Target goal - three weeks   Intervention ADL Training;Balance Work;Assistive Device   Meal Prep and Kitchen Mobility   Assist Level Supervision   Status Ongoing; Target goal - three weeks   Medication Management   Assist Level Supervision   Status Ongoing; Target goal - three weeks

## 2022-06-07 NOTE — PROGRESS NOTES
06/07/22 1215   Patient Data   Rehab Impairment Impairment of mobility, safety and Activities of Daily Living (ADLs) due to Brain Dysfunction:  02 22  Traumatic, Closed Injury   Etiologic Diagnosis Acute on Chronic SDH S/P Left Craniotomy   Date of Onset 04/12/22   Support System   Name Julieth Arana   Relationship wife   Home Setup   Type of Home Multi Level   Method of Entry Stairs;Curb   Number of Stairs 2   First Floor Bathroom Full   First Floor Setup Available Yes   Available Equipment   (none)   Prior Level of Function   Self-Care 3  Independent - Patient completed the activities by him/herself, with or without an assistive device, with no assistance from a helper  Indoor-Mobility (Ambulation) 3  Independent - Patient completed the activities by him/herself, with or without an assistive device, with no assistance from a helper  Stairs 3  Independent - Patient completed the activities by him/herself, with or without an assistive device, with no assistance from a helper  Functional Cognition 3  Independent - Patient completed the activities by him/herself, with or without an assistive device, with no assistance from a helper  Falls in the Last Year   Number of falls in the past 12 months 2   Type of Injury Associated with Fall Major injury   Psychosocial   Patient Behaviors/Mood Cooperative   Restrictions/Precautions   Precautions Bed/chair alarms;Cognitive; Fall Risk;Supervision on toilet/commode;Hard of hearing  (Ortho BPs with dizziness)   Pain Assessment   Pain Score 8   Pain Location/Orientation Location: Head   Hospital Pain Intervention(s)   (RN provided pain meds)   Toileting   Able to Pull Clothing down no, up no  Able to Manage Clothing Closures No   Findings stood in [facility] bathroom to use urinal as RN needed pt to try to void, and he was not very successful doing so sitting down  DUe to low BP neesd Ax2 for standing     Transfer Bed/Chair/Wheelchair   Limitations Noted In Balance; Endurance;Problem Solving; Sequencing;UE Strength;LE Strength   Adaptive Equipment None   Type of Assistance Needed Physical assistance; Adaptive equipment   Physical Assistance Level Total assistance   Comment practiced sit pivots Ax1 however SPT Ax2 due to fall risk factors and low BP   Chair/Bed-to-Chair Transfer CARE Score 1   Roll Left and Right   Type of Assistance Needed Physical assistance   Physical Assistance Level 25% or less   Roll Left and Right CARE Score 3   Sit to Lying   Type of Assistance Needed Physical assistance   Physical Assistance Level 25% or less   Sit to Lying CARE Score 3   Lying to Sitting on Side of Bed   Type of Assistance Needed Physical assistance   Physical Assistance Level 25% or less   Lying to Sitting on Side of Bed CARE Score 3   Sit to Stand   Type of Assistance Needed Physical assistance; Adaptive equipment   Physical Assistance Level 51%-75%   Comment use of arm rests   Sit to Stand CARE Score 2   Picking Up Object   Reason if not Attempted Safety concerns   Picking Up Object CARE Score 88   Car Transfer   Reason if not Attempted Safety concerns   Car Transfer CARE Score 88   Ambulation   Primary Mode of Locomotion Prior to Admission Walk   Distance Walked (feet) 30 ft  (15)   Assist Device Hand Hold  (Ax2)   Gait Pattern Inconsistant Maria Victoria; Slow Maria Victoria;Decreased foot clearance; Forward Flexion;R hemiparesis;L knee eli;Narrow ILIANA;Step to;Shuffle;Decreased R stance; Improper weight shift;Decreased L stance   Limitations Noted In Balance; Endurance;Posture;Speed;Strength;Swing   Provided Assistance with: Balance;Trunk Support;Weight Shift   Walk Assist Level Moderate Assist  (Min-ModAx 2 with gait belt)   Walk 10 Feet   Type of Assistance Needed Physical assistance   Physical Assistance Level Total assistance   Comment Ax2   Walk 10 Feet CARE Score 1   Walk 50 Feet with Two Turns   Reason if not Attempted Safety concerns   Walk 50 Feet with Two Turns CARE Score 88 Walk 150 Feet   Reason if not Attempted Safety concerns   Walk 150 Feet CARE Score 88   Walking 10 Feet on Uneven Surfaces   Reason if not Attempted Safety concerns   Walking 10 Feet on Uneven Surfaces CARE Score 88   Wheel 50 Feet with Two Turns   Reason if not Attempted Activity not applicable   Wheel 50 Feet with Two Turns CARE Score 9   Wheel 150 Feet   Reason if not Attempted Activity not applicable   Wheel 550 Feet CARE Score 9   Curb or Single Stair   Reason if not Attempted Safety concerns   1 Step (Curb) CARE Score 88   4 Steps   Reason if not Attempted Safety concerns   4 Steps CARE Score 88   12 Steps   Reason if not Attempted Safety concerns   12 Steps CARE Score 88   Comprehension   QI: Comprehension 2  Sometimes Understands: Understands only basic conversations or simple, direct phrases  Frequently requires cues to understand   Expression   QI: Expression 2  Frequently exhibits difficulty with expressing needs and ideas   Strength RLE   RLE Overall Strength 3/5   Strength LLE   LLE Overall Strength 3/5   Coordination   Movements are Fluid and Coordinated 0   Sensation   Light Touch   (diffcult to assess due to limited comprehension)   Cognition   Overall Cognitive Status Impaired   Arousal/Participation Cooperative   Attention Attends with cues to redirect   Comments very [place], does well reading lips when his wife speaks wtih him without mask on  Pocker talker didn't work for him in this session  he was abkle to [employer] written information during session and respond in simple sentences   Objective Measure   PT Measure(s) reviewed with wife start of session the general process of ARC/rehab/goals and need to determien through therapy what tasks he can do safely vs what he needs A/S level with due to deficits in safety, balance, righitng reactions and communication   Reviewed with her that often when the brain is healing people don't ask for help ebcuase they don't think they need it; therefore will need to monitor this with [place] Card prior to dc home  Pt was + for dizziness with BP from sit to stand; after use of NuStep (level 1m, high SPM around 80-90, with BUE, frequent rest breaks, 5 min total) BP was montrell to elevate ( see vitals section) and then he was montrell to practice walking  DOnlucero White start of session  pt refused SCDs end of session  Discharge Information   Patient's Discharge Plan home with family   Patient's Rehab Expectations didn't specifically verablize anything   Barriers to Discharge Home Decreased Cognitive Function;Decreased Strength;Decreased Endurance; Safety Considerations  (HATTIE< dec balance)   Impressions Payton Chopra is an 78 y/o male s/p fall at home where he sustained a SDH and had L craniotomy for SDH evacuation on 5/28/22  Pt presents wtih orthostatic BP and is dizzy upon standing, with history of L knee buckling but R hemiparesis after L SDH, as well as dec standing balance, dec righitng reactions, dec gait speed, dec processing through commands and motor planning, resulting in need for Ax2 for all standing functional mobility at this time  STairs not assessed today due to low BP  Pt is able to perform sit pivots/modified sit pivots at Ax1, however needs 2 people for SPT and walking due to L knee buckling and ortho BPs  Additionally, pt is very [place] and has dec comprehsion/expression  He does not verablize much without being asked questions or prompted  Pt's PMH also includes hyperlipidemia and urinary retention  Pt's physiological impairements previously stated result in activity barriers of all functional tasks at this time as he needs inc A and has inc fall risk factors  Pt's wife demonstrated understanding of education, however pt has very limited understanding of verbal communication  He will benefit from a [employer] memory journal to keep track of what he does on a daily basis   Currently anticipating 3 week LOS with goals of reaching [place] (possibly with RW) for transfers, short distnace mobility and toileting, however ancitcipating need for S for longer walking and community access and stairs  To confirm with wife that she /family will be home all of the time or not     PT Therapy Minutes   PT Time In 1215   PT Time Out 1320   PT Total Time (minutes) 65   PT Mode of treatment - Individual (minutes) 15   PT Mode of treatment - Concurrent (minutes) 50   PT Mode of treatment - Group (minutes) 0   PT Mode of treatment - Co-treat (minutes) 0   PT Mode of Treatment - Total time(minutes) 65 minutes   PT Cumulative Minutes 65   Cumulative Minutes   Cumulative therapy minutes 215

## 2022-06-07 NOTE — PROGRESS NOTES
Internal Medicine Progress Note  Patient: Giuseppe Dukes  Age/sex: 79 y o  male  Medical Record #: [id number]      ASSESSMENT/PLAN: (Interval History)  Giuseppe Dukes is seen and examined and management for following issues:    SDH s/p Lt craniotomy  · Monitor incisions  · Follow-up with Neurosurgery in 2 weeks  · Keppra for 7 days for seizure prophylaxis      Hyperlipidemia  · Continue statin  · Low cholesterol diet  Urinary retention  · Was on placed on flomax on acute side  · Now with orthostasis  · Will dc and monitor for retention    Orthostasis  · Encourage fluids  · Dc flomax as above  · Place TEDS        DC planning: reteam    The above assessment and plan was reviewed and updated as determined by my evaluation of the patient on 6/7/2022  Labs:   Results from last 7 days   Lab Units 06/06/22  0508 06/05/22  0449   WBC Thousand/uL 5 16 5 26   HEMOGLOBIN g/dL 13 3 13 5   HEMATOCRIT % 38 6 39 0   PLATELETS Thousands/uL 432* 403*     Results from last 7 days   Lab Units 06/06/22  0508 06/05/22  0449   SODIUM mmol/L 138 136   POTASSIUM mmol/L 4 0 3 8   CHLORIDE mmol/L 105 102   CO2 mmol/L 29 28   BUN mg/dL 15 14   CREATININE mg/dL 0 70 0 68   CALCIUM mg/dL 9 3 9 1         Results from last 7 days   Lab Units 06/02/22  0524   INR  1 02           Review of Scheduled Meds:  Current Facility-Administered Medications   Medication Dose Route Frequency Provider Last Rate    acetaminophen  650 mg Oral Q6H PRN Logan Aguirre MD      heparin (porcine)  5,000 Units Subcutaneous Q8H [facility] Logan Aguirre MD      melatonin  6 mg Oral HS Logan Aguirre MD      oxyCODONE  5 mg Oral Q4H PRN Logan Aguirre MD      Or   Idalia Saucedo oxyCODONE  2 5 mg Oral Q4H PRN Logan Aguirre MD      polyethylene glycol  17 g Oral Daily PRN Logan Aguirre MD      pravastatin  40 mg Oral Daily With Bernardo Gomez MD      tamsulosin  0 4 mg Oral Daily With Bernardo Gomez MD         Subjective/ HPI: Patient seen and examined   Patients overnight issues or events were reviewed with nursing or staff during rounds or morning huddle session  New or overnight issues include the following:     Pt seen and examined during ST  He is significantly hearing impaired and he is wearing devices  He does c/o dizziness with standing  I explained we will be holding his flomax for now and monitor  ROS:   A 10 point ROS was performed; negative except as noted above  Imaging:     No orders to display       *Labs /Radiology studies Reviewed  *Medications  reviewed and reconciled as needed  *Please refer to order section for additional ordered labs studies  *Case discussed with primary attending during morning huddle case rounds    Physical Examination:  Vitals:   Vitals:    06/06/22 1956 06/06/22 2025 06/07/22 0504 06/07/22 0557   BP:  120/73 111/58    BP Location:  Left arm Left arm    Pulse:  85 82    Resp:  20 18    Temp:  97 5 °F (36 4 °C) 98 3 °F (36 8 °C)    TempSrc:  Oral Oral    SpO2:  97% 94%    Weight: 77 6 kg (171 lb)   74 4 kg (164 lb)   Height:           GEN: No apparent distress, interactive  NEURO: Alert and oriented x3  HEENT: Pupils are equal and reactive, EOMI, mucous membranes are moist, face symmetrical  CV: S1 S2 regular, no MRG, no peripheral edema noted  RESP: Lungs are clear bilaterally, no wheezes, rales or rhonchi noted, on room air, respirations easy and non labored  GI: Flat, soft non tender, non distended; +BS x4  : Voiding without difficulty  MUSC: Moves all extremities  SKIN: pink, warm and dry, normal turgor, no rashes, lesions      The above physical exam was reviewed and updated as determined by my evaluation of the patient on 6/7/2022      Invasive Devices  Report    Peripheral Intravenous Line  Duration           Peripheral IV 06/06/22 Proximal;Right;Ventral (anterior) Forearm 1 day                   VTE Pharmacologic Prophylaxis: Heparin  Code Status: Level 1 - Full Code  Current Length of Stay: 1 day(s)      Total time spent:  30 minutes with more than 50% spent counseling/coordinating care  Counseling includes discussion with patient re: progress  and discussion with patient of his/her current medical state/information  Coordination of patient's care was performed in conjunction with primary service  Time invested included review of patient's labs, vitals, and management of their comorbidities with continued monitoring  In addition, this patient was discussed with medical team including physician and advanced extenders  The care of the patient was extensively discussed and appropriate treatment plan was formulated unique for this patient  ** Please Note:  voice to text software may have been used in the creation of this document   Although proof errors in transcription or interpretation are a potential of such software**

## 2022-06-08 PROCEDURE — 99232 SBSQ HOSP IP/OBS MODERATE 35: CPT | Performed by: INTERNAL MEDICINE

## 2022-06-08 PROCEDURE — 97110 THERAPEUTIC EXERCISES: CPT

## 2022-06-08 PROCEDURE — 97130 THER IVNTJ EA ADDL 15 MIN: CPT

## 2022-06-08 PROCEDURE — 97535 SELF CARE MNGMENT TRAINING: CPT

## 2022-06-08 PROCEDURE — 97129 THER IVNTJ 1ST 15 MIN: CPT

## 2022-06-08 PROCEDURE — 99232 SBSQ HOSP IP/OBS MODERATE 35: CPT | Performed by: PHYSICAL MEDICINE & REHABILITATION

## 2022-06-08 PROCEDURE — 97530 THERAPEUTIC ACTIVITIES: CPT

## 2022-06-08 PROCEDURE — 97116 GAIT TRAINING THERAPY: CPT

## 2022-06-08 RX ORDER — BISACODYL 10 MG
10 SUPPOSITORY, RECTAL RECTAL DAILY PRN
Status: DISCONTINUED | OUTPATIENT
Start: 2022-06-08 | End: 2022-06-12

## 2022-06-08 RX ADMIN — ACETAMINOPHEN 650 MG: 325 TABLET, FILM COATED ORAL at 05:14

## 2022-06-08 RX ADMIN — POLYETHYLENE GLYCOL 3350 17 G: 17 POWDER, FOR SOLUTION ORAL at 10:49

## 2022-06-08 RX ADMIN — HEPARIN SODIUM 5000 UNITS: 5000 INJECTION INTRAVENOUS; SUBCUTANEOUS at 13:58

## 2022-06-08 RX ADMIN — HEPARIN SODIUM 5000 UNITS: 5000 INJECTION INTRAVENOUS; SUBCUTANEOUS at 05:14

## 2022-06-08 RX ADMIN — PRAVASTATIN SODIUM 40 MG: 40 TABLET ORAL at 17:29

## 2022-06-08 RX ADMIN — HEPARIN SODIUM 5000 UNITS: 5000 INJECTION INTRAVENOUS; SUBCUTANEOUS at 21:27

## 2022-06-08 RX ADMIN — Medication 6 MG: at 21:26

## 2022-06-08 NOTE — PROGRESS NOTES
Physical Medicine and Rehabilitation Progress Note  Lei Fortune 79 y o  male MRN: [id number]  Unit/Bed#: -35 Encounter: [id number]        HPI: Patient is a 80 yo male with h/o fall and L SDH which was being monitored by neurosx with serial CTH however patient's recent OP CTH showed expansion of L SDH with increasing midline shift therefore patient underwent L craniotomy for SDH evacuation on 5/28 by Dr Micaela Hirsch  A L MMA embolization was attempted as well however was aborted due to anatomic variation that would not allow for the procedure  Chief Complaint: L SDH    Subjective: Patient seen in therapy this AM and noted to be tolerating therapy without issue     ROS: A 10 point ROS was performed; negative except as noted above       Assessment/Plan:    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: was being monitored with serial CTH however recent OP CTH showed expansion of L SDH with increased midline shift therefore patient underwent L craniotomy for SDH evacuation on 5/28 by Dr Micaela Hirsch, a L MMA embolization was attempted but ultimately aborted due to anatomic variation that would not allow for procedure, s/p keppra course for ppx, goal SBP<160 per neurosx, holding AP/AC/NSAIDs per neurosx (not on AC/AP agents at home PTA) but was cleared by neurosx in acute care for [facility] for DVT ppx and patient was started on HSQ in acute care on 6/3; OP FU with neurosx scheduled for 6/15 and 7/7 with FU CTH 2-3 days PTA per neurosx protocol ([facility] x 2 already e-prescribed in EMR by neurosx team)     Dyslipidemia: therapeutic substitution for home zocor 20 mg qpm      Urinary retention: per d/w spouse appears to have symptoms of enlarged prostate at home, started on flomax in acute care however IM dc'd due to low BPs, checking PVRs      Thrombocytosis: mild at 432 (), likely reactive, IM monitoring      h/o rt caudate lacunar infarct: incidental finding on imaging in acute care, therapeutic substitution for home zocor 20 mg qpm, not on AP/AC at home however not currently a candidate for AP/AC due to 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 per neurosx recs     DVT ppx: HSQ (cleared for HSQ in acute care by neurosx and was started on HSQ in acute care on 6/3)     Incidental findings:     1) abnormalities on echocardiogram including but not limited to grade 1 DD & abnormal septal motion     2) abnormalities on EKG including but not limited to wide QRS/RBBB     3) mild left maxillary and left ethmoid mucosal thickening              Objective:    Functional Update:  Mobility: mod   Transfers: min-mod  ADLs: mod       Physical Exam:    Vitals:    06/07/22 1500   BP: 130/78   Pulse: 80   Resp: 16   Temp: 97 6 °F (36 4 °C)   SpO2: 97%             General: alert, no apparent distress, cooperative and comfortable    Eye: Normal external eye, conjunctiva, lids, sclera   Ears: Normal external ears  Nose: Normal external nose, mucus membranes  CARDIAC:  +S1/2  LUNGS:  no abnormal respiratory pattern, no retractions noted, non-labored breathing   ABDOMEN:  soft NT   EXTREMITIES:  no cyanosis or clubbing   NEURO: awake, alert, following commands    PSYCH:  mood/affect currently stable     This patient was discussed by the Interdisciplinary Team in weekly case conference today  The care of the patient was extensively discussed with all care providers and an appropriate rehabilitation plan was formulated unique for this patient  Barriers were identified preventing progression of therapy and appropriate interventions were discussed with each discipline  Please see the team note for input from all disciplines regarding barriers, intervention, and discharge planning  [ x ] Total time spent: 35 Mins, and greater than 50% of this time was spent counseling/coordinating care

## 2022-06-08 NOTE — PROGRESS NOTES
06/08/22 0831   Pain Assessment   Pain Assessment Tool 0-10   Pain Score No Pain   Restrictions/Precautions   Precautions Bed/chair alarms;Cognitive; Fall Risk;Hard of hearing;Supervision on toilet/commode  (ortho BPs; knee high TEDS)   Weight Bearing Restrictions No   ROM Restrictions No   Putting On/Taking Off Footwear   Type of Assistance Needed Physical assistance   Physical Assistance Level 51%-75%   Comment assist to loosen shoe laces, set up shoes with pt placing toes/foot in shoes, requires assist to get heel down and assist for shoe laces   Putting On/Taking Off Footwear CARE Score 2   Sit to Stand   Type of Assistance Needed Physical assistance;Verbal cues   Physical Assistance Level 51%-75%   Comment from various surfaces, higher surfaces less assist however max VCs/tactile cues for hand placement   Sit to Stand CARE Score 2   Bed-Chair Transfer   Type of Assistance Needed Physical assistance   Physical Assistance Level Total assistance   Comment Min/Mod Ax1 for sit pivots with max tactile cues for hand placement; Min/mod Ax1 with SBA x1 for SPT due to ortho BPs in upright position  Chair/Bed-to-Chair Transfer CARE Score 1   Toileting Hygiene   Type of Assistance Needed Physical assistance   Physical Assistance Level 76% or more   Comment pt able to initiate pants down, requires assist for rear hygiene post BM and assist for pants up due to ortho BPs  Toileting Hygiene CARE Score 2   Toilet Transfer   Type of Assistance Needed Physical assistance   Physical Assistance Level 26%-50%   Comment SPT with wall GB to BSC over top toilet; max tactile cues for hand placement   Toilet Transfer CARE Score 3   Cognition   Overall Cognitive Status Impaired   Arousal/Participation Alert; Cooperative   Attention Attends with cues to redirect   Orientation Level Oriented X4   Memory Decreased recall of precautions   Following Commands Follows one step commands with increased time or repetition   Activity Tolerance Activity Tolerance Patient tolerated treatment well   Medical Staff Made Aware (S)  MERY Lambert informed of BPs, and BM; Dr Malka Lucio informed of ortho BPs--instructed THIGH high TEDS and ab binder   Assessment   Treatment Assessment pt engages in 90 minute skilled OT session focusing on building a rapport, gathering information via white board and writing, BP management/assessment, repetitive SPT with RW and toileting tasks  BPs charted in vitals tab--Dr Malka Lucio requeting THIGH high TEDS and ab binder due to ortho BPs  repetitive STS and SPT with RW completed with max tactile cues for safe hand placement especially during stand to sit transfers, with poor carryover noted  pt with no c/o dizziness during repetitive transfer training, mostly just upon arrival to room while sitting in recliner chair  low BP do limit pt IND and progress as pt requires 2 person assist for safety concerns due to low BP when in stance  conversations had through white board and writing down questions as pt is very [place]--does well with reading lips, however not able to use this method due to mask policy  wife present mid way through session and confirmed answers to questions previously asked--per spouse, spouse assists with cutting up all meal items due to carpal tunnel limitations mostly in R hand, spouse was assisting with tying shoe laces only, pt was able to don shoes, spouse completes all meal prep, kitchen mobility, laundry and finances   pt was responsible for mowing lawn and yard work however spouse reports their son is able to assist with this as needed upon d/c  pt and spouse report pt was only taking 1 cholesterol medication PTA, which spouse would place on counter top for him--spouse to continue to do so with additional meds as needed at time of d/c  recommend continued skilled care to focus on ADL retraining, func transfers, standing ami/bal, func cog, safety, RW safety, family training as needed in order to decrease burden of care at d/c    Prognosis Good   Problem List Decreased strength;Decreased endurance; Impaired balance;Decreased coordination;Decreased cognition;Decreased mobility; Decreased safety awareness   Barriers to Discharge Inaccessible home environment;Decreased caregiver support   Plan   Treatment/Interventions ADL retraining;Functional transfer training; Therapeutic exercise; Endurance training;Cognitive reorientation;Patient/family training;Equipment eval/education; Compensatory technique education   OT Therapy Minutes   OT Time In 0830   OT Time Out 1000   OT Total Time (minutes) 90   OT Mode of treatment - Individual (minutes) 90   OT Mode of treatment - Concurrent (minutes) 0   OT Mode of treatment - Group (minutes) 0   OT Mode of treatment - Co-treat (minutes) 0   OT Mode of Treatment - Total time(minutes) 90 minutes   OT Cumulative Minutes 180   Therapy Time missed   Time missed?  No

## 2022-06-08 NOTE — PROGRESS NOTES
Internal Medicine Progress Note  Patient: Sonu Higgins  Age/sex: 79 y o  male  Medical Record #: [id number]      ASSESSMENT/PLAN: (Interval History)  Sonu Higgins is seen and examined and management for following issues:    SDH s/p Lt craniotomy  · Monitor incisions  · Follow-up with Neurosurgery in 2 weeks  · S/p Keppra for 7 days for seizure prophylaxis      Hyperlipidemia  · Continue statin  · Low cholesterol diet  Urinary retention  · Was on placed on flomax on acute side  · Now with orthostasis  · dc'd flomax 6/7  · Required SC x3    Orthostasis  · Encourage fluids  · Dc flomax as above  · Place TEDS    [place]  · Assistive device at bedside    DC planning: reteam    The above assessment and plan was reviewed and updated as determined by my evaluation of the patient on 6/8/2022  Labs:   Results from last 7 days   Lab Units 06/06/22  0508 06/05/22  0449   WBC Thousand/uL 5 16 5 26   HEMOGLOBIN g/dL 13 3 13 5   HEMATOCRIT % 38 6 39 0   PLATELETS Thousands/uL 432* 403*     Results from last 7 days   Lab Units 06/06/22  0508 06/05/22  0449   SODIUM mmol/L 138 136   POTASSIUM mmol/L 4 0 3 8   CHLORIDE mmol/L 105 102   CO2 mmol/L 29 28   BUN mg/dL 15 14   CREATININE mg/dL 0 70 0 68   CALCIUM mg/dL 9 3 9 1         Results from last 7 days   Lab Units 06/02/22  0524   INR  1 02           Review of Scheduled Meds:  Current Facility-Administered Medications   Medication Dose Route Frequency Provider Last Rate    acetaminophen  650 mg Oral Q6H PRN Dangelo Santizo MD      heparin (porcine)  5,000 Units Subcutaneous Q8H [address] Dangelo Santizo MD      melatonin  6 mg Oral HS Dangelo Santizo MD      oxyCODONE  5 mg Oral Q4H PRN Dangelo Santizo MD      Or   Trae Esteves oxyCODONE  2 5 mg Oral Q4H PRN Dangelo Santizo MD      polyethylene glycol  17 g Oral Daily PRN Dangelo Santizo MD      pravastatin  40 mg Oral Daily With Jhon Gallagher MD         Subjective/ HPI: Patient seen and examined   Patients overnight issues or events were reviewed with nursing or staff during rounds or morning huddle session  New or overnight issues include the following:     Pt seen and examined in his room in therapy  No complaints overnight, has required SC x 3      ROS:   A 10 point ROS was performed; negative except as noted above  Imaging:     No orders to display       *Labs /Radiology studies Reviewed  *Medications  reviewed and reconciled as needed  *Please refer to order section for additional ordered labs studies  *Case discussed with primary attending during morning huddle case rounds    Physical Examination:  Vitals:   Vitals:    06/07/22 0557 06/07/22 1500 06/07/22 2055 06/08/22 0513   BP:  130/78 120/63 120/72   BP Location:  Left arm Left arm Left arm   Pulse:  80 83 84   Resp:  16 17 16   Temp:  97 6 °F (36 4 °C) 98 4 °F (36 9 °C) 98 1 °F (36 7 °C)   TempSrc:  Oral Oral Oral   SpO2:  97% 97% 94%   Weight: 74 4 kg (164 lb)      Height:           GEN: No apparent distress, interactive  NEURO: Alert and oriented x3  HEENT: Pupils are equal and reactive, EOMI, mucous membranes are moist, face symmetrical; extremely [place] cont assistive device  CV: S1 S2 regular, no MRG, no peripheral edema noted  RESP: Lungs are clear bilaterally, no wheezes, rales or rhonchi noted, on room air, respirations easy and non labored  GI: Flat, soft non tender, non distended; +BS x4  : Voiding without difficulty  MUSC: Moves all extremities  SKIN: pink, warm and dry, normal turgor, incision intact      The above physical exam was reviewed and updated as determined by my evaluation of the patient on 6/8/2022  Invasive Devices  Report    Peripheral Intravenous Line  Duration           Peripheral IV 06/06/22 Proximal;Right;Ventral (anterior) Forearm 2 days                   VTE Pharmacologic Prophylaxis: Heparin  Code Status: Level 1 - Full Code  Current Length of Stay: 2 day(s)      Total time spent:  30 minutes with more than 50% spent counseling/coordinating care  Counseling includes discussion with patient re: progress  and discussion with patient of his/her current medical state/information  Coordination of patient's care was performed in conjunction with primary service  Time invested included review of patient's labs, vitals, and management of their comorbidities with continued monitoring  In addition, this patient was discussed with medical team including physician and advanced extenders  The care of the patient was extensively discussed and appropriate treatment plan was formulated unique for this patient  ** Please Note:  voice to text software may have been used in the creation of this document   Although proof errors in transcription or interpretation are a potential of such software**

## 2022-06-08 NOTE — PROGRESS NOTES
06/08/22 1230   Pain Assessment   Pain Score No Pain   Restrictions/Precautions   Precautions Bed/chair alarms; Fall Risk;Hard of hearing;Supervision on toilet/commode  (ortho BP's)   Braces or Orthoses   (thigh high TEDS and abd binder which were donned at start of session)   Cognition   Arousal/Participation Alert; Cooperative   Attention Attends with cues to redirect   Memory Decreased recall of precautions;Decreased short term memory   Following Commands Follows one step commands with increased time or repetition   Comments pt  very [place], utilized white board initially but pt's pocket talker started to work and pt  able to hear with pocket talker   Subjective   Subjective No complaints reported  pt  denied dizziness and pain   Sit to Stand   Type of Assistance Needed Physical assistance   Physical Assistance Level 26%-50%   Comment tactile cues for hand placement   Sit to Stand CARE Score 3   Bed-Chair Transfer   Type of Assistance Needed Physical assistance   Physical Assistance Level 51%-75%   Comment mod A SPT without AD   Chair/Bed-to-Chair Transfer CARE Score 2   Transfer Bed/Chair/Wheelchair   Adaptive Equipment None   Walk 10 Feet   Type of Assistance Needed Physical assistance   Physical Assistance Level Total assistance   Comment min to mod A X 1 with CF   Walk 10 Feet CARE Score 1   Walk 50 Feet with Two Turns   Comment limited by fatigue   Reason if not Attempted Safety concerns   Walk 50 Feet with Two Turns CARE Score 88   Walk 150 Feet   Reason if not Attempted Safety concerns   Walk 150 Feet CARE Score 88   Walking 10 Feet on Uneven Surfaces   Comment trial tomorrow if safe   Reason if not Attempted Safety concerns   Walking 10 Feet on Uneven Surfaces CARE Score 88   Ambulation   Does the patient walk? 2  Yes   Primary Mode of Locomotion Prior to Admission Walk   Distance Walked (feet) 30 ft  (X 1, 35 X 1, 40 X 1 and 15 X 1)   Assist Device Hand Hold  (R)   Gait Pattern Slow [place]; Improper weight shift;Narrow ILIANA  (dec stride length)   Limitations Noted In Coordination;Balance   Provided Assistance with: Balance;Direction   Walk Assist Level Minimum Assist;Moderate Assist;Assist x 1;Chair Follow   Wheel 50 Feet with Two Turns   Reason if not Attempted Activity not applicable   Wheel 50 Feet with Two Turns CARE Score 9   Wheel 150 Feet   Reason if not Attempted Activity not applicable   Wheel 259 Feet CARE Score 9   Wheelchair mobility   Does the patient use a wheelchair? 0  No   Curb or Single Stair   Comment trial tomorrow if safe   Reason if not Attempted Safety concerns   1 Step (Curb) CARE Score 88   4 Steps   Reason if not Attempted Safety concerns   4 Steps CARE Score 88   Toilet Transfer   Type of Assistance Needed Physical assistance   Physical Assistance Level 26%-50%   Toilet Transfer CARE Score 3   Toilet Transfer   Surface Assessed Raised Toilet   Adaptive Equipment Grab Bar  (HHA)   Therapeutic Interventions   Flexibility B hamstring and gastroc stretching   Equipment Use   NuStep Level 1 X 10 mins spm 60-80   Other Comments   Comments BP assessed and are reflected on vitals section   Assessment   Treatment Assessment Pt  engaged in 60 mins PT session and was able to tolerate above activities with no complaints of pain or dizziness  BP did drop from sitting to standing but not significantly with pt  asymtomatic  Pt  dmeonstrates improved functional mobility today to min to mod A for transfers and min to mod HHA X 1 with ambulation to 40 feet  No major LOB noted during session  Pt  able to respond appropriately to written instructions due to AISHA [facility] but is also able to respond to now working pocket talker  Pt  in toilet at end of session with rehab aide present  Cont with POC as tolerated  Problem List Decreased strength;Decreased endurance; Impaired balance;Decreased mobility; Decreased coordination;Decreased cognition; Impaired judgement;Decreased safety awareness   Barriers to Discharge Inaccessible home environment;Decreased caregiver support   PT Barriers   Functional Limitation Car transfers;Stair negotiation;Standing;Transfers; Walking   Plan   Treatment/Interventions Functional transfer training;LE strengthening/ROM; Therapeutic exercise;Elevations; Endurance training;Patient/family training;Equipment eval/education; Bed mobility;Gait training   Recommendation   PT Discharge Recommendation   (TBD)   Equipment Recommended   (LRAD)   PT Therapy Minutes   PT Time In 1230   PT Time Out 1330   PT Total Time (minutes) 60   PT Mode of treatment - Individual (minutes) 60   PT Mode of treatment - Concurrent (minutes) 0   PT Mode of treatment - Group (minutes) 0   PT Mode of treatment - Co-treat (minutes) 0   PT Mode of Treatment - Total time(minutes) 60 minutes   PT Cumulative Minutes 125   Therapy Time missed   Time missed?  No

## 2022-06-08 NOTE — PROGRESS NOTES
Physical Medicine and Rehabilitation Progress Note  Giuseppe Mcnultyy 79 y o  male MRN: [id number]  Unit/Bed#: -58 Encounter: [id number]          HPI: Patient is a 80 yo male with h/o fall and L SDH which was being monitored by neurosx with serial CTH however patient's recent OP CTH showed expansion of L SDH with increasing midline shift therefore patient underwent L craniotomy for SDH evacuation on 5/28 by Dr Roseann Mabry  A L MMA embolization was attempted as well however was aborted due to anatomic variation that would not allow for the procedure  Chief Complaint: L SDH    Subjective: Patient seen at bedside this AM and patient was comfortable     ROS: A 10 point ROS was performed; negative except as noted above       Assessment/Plan:    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: was being monitored with serial CTH however recent OP CTH showed expansion of L SDH with increased midline shift therefore patient underwent L craniotomy for SDH evacuation on 5/28 by Dr Roseann Mabry, a L MMA embolization was attempted but ultimately aborted due to anatomic variation that would not allow for procedure, s/p keppra course for ppx, goal SBP<160 per neurosx, holding AP/AC/NSAIDs per neurosx (not on AC/AP agents at home PTA) but was cleared by neurosx in acute care for [facility] for DVT ppx and patient was started on HSQ in acute care on 6/3; OP FU with neurosx scheduled for 6/15 and 7/7 with FU CTH 2-3 days PTA per neurosx protocol ([address] x 2 already e-prescribed in EMR by neurosx team)     Dyslipidemia: therapeutic substitution for home zocor 20 mg qpm      Urinary retention: per d/w spouse appears to have symptoms of enlarged prostate at home, started on flomax in acute care however IM dc'd due to low BPs, checking PVRs      Thrombocytosis: mild at 432 (), likely reactive, IM monitoring      h/o rt caudate lacunar infarct: incidental finding on imaging in acute care, therapeutic substitution for home zocor 20 mg qpm, not on AP/AC at home however not currently a candidate for AP/AC due to 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 per neurosx recs     DVT ppx: HSQ (cleared for HSQ in acute care by neurosx and was started on HSQ in acute care on 6/3)     Incidental findings:     1) abnormalities on echocardiogram including but not limited to grade 1 DD & abnormal septal motion     2) abnormalities on EKG including but not limited to wide QRS/RBBB     3) mild left maxillary and left ethmoid mucosal thickening              Objective:    Functional Update:  Mobility: mod   Transfers: min-mod  ADLs: mod       Physical Exam:    Vitals:    06/08/22 1605   BP: 119/68   Pulse: 76   Resp: 16   Temp: 97 6 °F (36 4 °C)   SpO2: 97%               General: alert, no apparent distress, cooperative and comfortable    Eye: Normal external eye, conjunctiva, lids, sclera   Ears: Normal external ears  Nose: Normal external nose, mucus membranes  CARDIAC:  +S1/2  LUNGS:  no abnormal respiratory pattern, no retractions noted, non-labored breathing   ABDOMEN:  soft NT   EXTREMITIES:  no cyanosis or clubbing   NEURO: awake, alert, following commands    PSYCH:  mood/affect currently stable

## 2022-06-08 NOTE — PROGRESS NOTES
06/08/22 1400   Pain Assessment   Pain Assessment Tool 0-10   Pain Score No Pain   Restrictions/Precautions   Precautions Bed/chair alarms; Fall Risk;Hard of hearing;Supervision on toilet/commode   Comprehension   Comprehension (FIM) 5 - Needs slowed speech to understand   Expression   Expression (FIM) 5 - Needs help/cues only RARELY (< 10% of the time)   Social Interaction   Social Interaction (FIM) 5 - Interacts appropriately with others 90% of time   Problem Solving   Problem solving (FIM) 5 - Solves basic problems 90% of time   Memory   Memory (FIM) 5 - Needs cueing reminders <10%   Speech/Language/Cognition Assessmetn   Treatment Assessment Pt was in recliner upon arrival into room for session today, which wife, [facility] was present but stepped out for session to allow pt to attend to SLP and the questions presented today  SLP engaged in review of daily events in which pt did recall items consumed at last meal (lunchtime) accurate, as well as stating some of the activities he complete in PT session just 30 min prior  Otherwise, focus of session was completing portions of the informal cognitive assessment targeting functional organization, categorization, problem solving  Of note, pt was able to use Pocket Talker for the initial portion of the session, but as more structured tasks occurred, white board was needed to be used for th content words being targeted for improved comprehension due to [address]  Initiated questions by asking pt about basic verbal problem solving, to which pt was 5/5 accurate in providing an appropriate response, given increased time  When completing compare/contrast given 2 words to determine how items were alike and different, pt was 4/4 accurate in identifying similarities and 4/4 accurate in determining differences  Last task completed was organization/category exclusion given 4 words, where pt was 3/3 accurate in this task   Overall, pt was engaged throughout session, continuing to exhibit increased processing time due to [facility] status, as well as providing longer speech utterances in tasks provided which were effective in communication of thoughts  Additionally noted was pt's awareness given the need for bladder scan and when SLP asked about not being able to void, pt was able to elaborate on this a bit and was asking appropriate questions as to why this would be happening  Pt's wife provided update in regards to session, which currently basic functional cognitive skills do appear to be at prior level of functioning but will continue to f/u and monitor skills to ensure overall safety throughout stay on the unit  At this time, pt will continue to benefit from ongoing skilled SLP services to maximize overall functional cognitive linguistic skills to decrease caregiver burden at time of discharge  SLP Therapy Minutes   SLP Time In 1400   SLP Time Out 1430   SLP Total Time (minutes) 30   SLP Mode of treatment - Individual (minutes) 30   SLP Mode of treatment - Concurrent (minutes) 0   SLP Mode of treatment - Group (minutes) 0   SLP Mode of treatment - Co-treat (minutes) 0   SLP Mode of Treatment - Total time(minutes) 30 minutes   SLP Cumulative Minutes 90   Therapy Time missed   Time missed?  No

## 2022-06-08 NOTE — PLAN OF CARE
Problem: PAIN - ADULT  Goal: Verbalizes/displays adequate comfort level or baseline comfort level  Description: Interventions:  - Encourage patient to monitor pain and request assistance  - Assess pain using appropriate pain scale  - Administer analgesics based on type and severity of pain and evaluate response  - Implement non-pharmacological measures as appropriate and evaluate response  - Consider cultural and social influences on pain and pain management  - Notify physician/advanced practitioner if interventions unsuccessful or patient reports new pain  Outcome: Progressing     Problem: INFECTION - ADULT  Goal: Absence or prevention of progression during hospitalization  Description: INTERVENTIONS:  - Assess and monitor for signs and symptoms of infection  - Monitor lab/diagnostic results  - Monitor all insertion sites, i e  indwelling lines, tubes, and drains  - Monitor endotracheal if appropriate and nasal secretions for changes in amount and color  - [place] appropriate cooling/warming therapies per order  - Administer medications as ordered  - Instruct and encourage patient and family to use good hand hygiene technique  - Identify and instruct in appropriate isolation precautions for identified infection/condition  Outcome: Progressing  Goal: Absence of fever/infection during neutropenic period  Description: INTERVENTIONS:  - Monitor WBC    Outcome: Progressing     Problem: SAFETY ADULT  Goal: Patient will remain free of falls  Description: INTERVENTIONS:  - Educate patient/family on patient safety including physical limitations  - Instruct patient to call for assistance with activity   - Consult OT/PT to assist with strengthening/mobility   - Keep Call bell within reach  - Keep bed low and locked with side rails adjusted as appropriate  - Keep care items and personal belongings within reach  - Initiate and maintain comfort rounds  - Make Fall Risk Sign visible to staff  - Offer Toileting every Hours, in advance of need  - Initiate/Maintain alarm  - Obtain necessary fall risk management equipment:   - Apply yellow socks and bracelet for high fall risk patients  - Consider moving patient to room near nurses station  Outcome: Progressing  Goal: Maintain or return to baseline ADL function  Description: INTERVENTIONS:  -  Assess patient's ability to carry out ADLs; assess patient's baseline for ADL function and identify physical deficits which impact ability to perform ADLs (bathing, care of mouth/teeth, toileting, grooming, dressing, etc )  - Assess/evaluate cause of self-care deficits   - Assess range of motion  - Assess patient's mobility; develop plan if impaired  - Assess patient's need for assistive devices and provide as appropriate  - Encourage maximum independence but intervene and supervise when necessary  - Involve family in performance of ADLs  - Assess for home care needs following discharge   - Consider OT consult to assist with ADL evaluation and planning for discharge  - Provide patient education as appropriate  Outcome: Progressing  Goal: Maintains/Returns to pre admission functional level  Description: INTERVENTIONS:  - Perform BMAT or MOVE assessment daily    - Set and communicate daily mobility goal to care team and patient/family/caregiver  - Collaborate with rehabilitation services on mobility goals if consulted  - Perform Range of Motion  times a day  - Reposition patient every  hours    - Dangle patient  times a day  - Stand patient  times a day  - Ambulate patient  times a day  - Out of bed to chair times a day   - Out of bed for meals times a day  - Out of bed for toileting  - Record patient progress and toleration of activity level   Outcome: Progressing     Problem: DISCHARGE PLANNING  Goal: Discharge to home or other facility with appropriate resources  Description: INTERVENTIONS:  - Identify barriers to discharge w/patient and caregiver  - Arrange for needed discharge resources and transportation as appropriate  - Identify discharge learning needs (meds, wound care, etc )  - Arrange for interpretive services to assist at discharge as needed  - Refer to Case Management Department for coordinating discharge planning if the patient needs post-hospital services based on physician/advanced practitioner order or complex needs related to functional status, cognitive ability, or social support system  Outcome: Progressing     Problem: Prexisting or High Potential for Compromised Skin Integrity  Goal: Skin integrity is maintained or improved  Description: INTERVENTIONS:  - Identify patients at risk for skin breakdown  - Assess and monitor skin integrity  - Assess and monitor nutrition and hydration status  - Monitor labs   - Assess for incontinence   - Turn and reposition patient  - Assist with mobility/ambulation  - Relieve pressure over bony prominences  - Avoid friction and shearing  - Provide appropriate hygiene as needed including keeping skin clean and dry  - Evaluate need for skin moisturizer/barrier cream  - Collaborate with interdisciplinary team   - Patient/family teaching  - Consider wound care consult   Outcome: Progressing     Problem: Potential for Falls  Goal: Patient will remain free of falls  Description: INTERVENTIONS:  - Educate patient/family on patient safety including physical limitations  - Instruct patient to call for assistance with activity   - Consult OT/PT to assist with strengthening/mobility   - Keep Call bell within reach  - Keep bed low and locked with side rails adjusted as appropriate  - Keep care items and personal belongings within reach  - Initiate and maintain comfort rounds  - Make Fall Risk Sign visible to staff  - Offer Toileting every Hours, in advance of need  - Initiate/Maintain alarm  - Obtain necessary fall risk management equipment:   - Apply yellow socks and bracelet for high fall risk patients  - Consider moving patient to room near nurses station  Outcome: Progressing     Problem: Nutrition/Hydration-ADULT  Goal: Nutrient/Hydration intake appropriate for improving, restoring or maintaining nutritional needs  Description: Monitor and assess patient's nutrition/hydration status for malnutrition  Collaborate with interdisciplinary team and initiate plan and interventions as ordered  Monitor patient's weight and dietary intake as ordered or per policy  Utilize nutrition screening tool and intervene as necessary  Determine patient's food preferences and provide high-protein, high-caloric foods as appropriate       INTERVENTIONS:  - Monitor oral intake, urinary output, labs, and treatment plans  - Assess nutrition and hydration status and recommend course of action  - Evaluate amount of meals eaten  - Assist patient with eating if necessary   - Allow adequate time for meals  - Recommend/ encourage appropriate diets, oral nutritional supplements, and vitamin/mineral supplements  - Order, calculate, and assess calorie counts as needed  - Recommend, monitor, and adjust tube feedings and TPN/PPN based on assessed needs  - Assess need for intravenous fluids  - Provide specific nutrition/hydration education as appropriate  - Include patient/family/caregiver in decisions related to nutrition  Outcome: Progressing

## 2022-06-09 PROCEDURE — 97110 THERAPEUTIC EXERCISES: CPT

## 2022-06-09 PROCEDURE — 97116 GAIT TRAINING THERAPY: CPT

## 2022-06-09 PROCEDURE — 97535 SELF CARE MNGMENT TRAINING: CPT

## 2022-06-09 PROCEDURE — 99232 SBSQ HOSP IP/OBS MODERATE 35: CPT | Performed by: INTERNAL MEDICINE

## 2022-06-09 PROCEDURE — 97530 THERAPEUTIC ACTIVITIES: CPT

## 2022-06-09 RX ORDER — MIDODRINE HYDROCHLORIDE 2.5 MG/1
2.5 TABLET ORAL
Status: DISCONTINUED | OUTPATIENT
Start: 2022-06-09 | End: 2022-06-21 | Stop reason: HOSPADM

## 2022-06-09 RX ORDER — MIDODRINE HYDROCHLORIDE 2.5 MG/1
2.5 TABLET ORAL DAILY
Status: DISCONTINUED | OUTPATIENT
Start: 2022-06-09 | End: 2022-06-13

## 2022-06-09 RX ADMIN — Medication 6 MG: at 21:03

## 2022-06-09 RX ADMIN — HEPARIN SODIUM 5000 UNITS: 5000 INJECTION INTRAVENOUS; SUBCUTANEOUS at 13:18

## 2022-06-09 RX ADMIN — PRAVASTATIN SODIUM 40 MG: 40 TABLET ORAL at 17:15

## 2022-06-09 RX ADMIN — HEPARIN SODIUM 5000 UNITS: 5000 INJECTION INTRAVENOUS; SUBCUTANEOUS at 05:27

## 2022-06-09 RX ADMIN — OXYCODONE HYDROCHLORIDE 5 MG: 5 TABLET ORAL at 06:54

## 2022-06-09 RX ADMIN — HEPARIN SODIUM 5000 UNITS: 5000 INJECTION INTRAVENOUS; SUBCUTANEOUS at 21:03

## 2022-06-09 NOTE — PROGRESS NOTES
06/09/22 1400   Clinical Encounter Type   Visited With Patient and family together   Routine Visit Follow-up   Sacramental Encounters   Communion Given Indicator Yes   Sacrament Other Other (Comment)  (Milla Bridegsandy)

## 2022-06-09 NOTE — QUICK NOTE
PMR Coverage    Reviewed vitals, labs, and chart  Last BM 6/8  Holding about 500cc in bladder before voiding, and voids variable amounts, leaving behind anywhere from 1/2-2/3 of what is in his bladder  Continue scan/cath protocol as per primary team  Continue current plan of care       Lani Lipscomb MD  Physical Medicine and Rehabilitation

## 2022-06-09 NOTE — PROGRESS NOTES
06/09/22 1500   Pain Assessment   Pain Assessment Tool 0-10   Pain Score No Pain   Maciel-Baker FACES Pain Rating 0   Restrictions/Precautions   Precautions Fall Risk;Cognitive;Bed/chair alarms;Supervision on toilet/commode;Hard of hearing;Seizure  (pocket talker)   Braces or Orthoses   (thigh high TEDS, abdominal binder)   General   Change In Medical/Functional Status perform ortho BPs at start of tx session and document under vitals  also check vitals as needed during tx  Cognition   Overall Cognitive Status Impaired   Arousal/Participation Alert; Cooperative   Subjective   Subjective pt denied any pain and only reported dizziness during initial standing then denied after  see vitals for ortho BPs  Sit to Stand   Type of Assistance Needed Physical assistance;Verbal cues; Adaptive equipment   Physical Assistance Level 26%-50%   Comment tactile/VC for hand placement   Sit to Stand CARE Score 3   Bed-Chair Transfer   Type of Assistance Needed Physical assistance;Verbal cues   Physical Assistance Level 26%-50%   Comment no AD , assist on gait belt   Chair/Bed-to-Chair Transfer CARE Score 3   Walk 10 Feet   Type of Assistance Needed Physical assistance;Verbal cues   Physical Assistance Level Total assistance   Comment initially attempted 1 person assist but pt started holding furniture so provided with 2nd person for comfort - denied dizziness while walking x 20', 30'   Walk 10 Feet CARE Score 1   Walk 50 Feet with Two Turns   Type of Assistance Needed Physical assistance;Verbal cues   Physical Assistance Level Total assistance   Comment no dizziness  x 50'   Walk 50 Feet with Two Turns CARE Score 1   Walk 150 Feet   Comment fatigue and weakness   Reason if not Attempted Safety concerns   Walk 150 Feet CARE Score 88   Walking 10 Feet on Uneven Surfaces   Type of Assistance Needed Physical assistance;Verbal cues   Physical Assistance Level Total assistance   Comment 2 person HHA over floor mat   Walking 10 Feet on Uneven Surfaces CARE Score 1   Therapeutic Interventions   Strengthening pt and wife educated on HEP with handout provided for seated marches, LAQ and heel toe raises with reps as able  bilat hamstring and gastroc mm stretching as tolerated   Equipment Use   NuStep L2 x 8 mins all ext VC to keep SPM 60-70 for pacing  DE 83-94, O2 >94% - no reported dizziness and stated tomorrow he will do 10 mins on the nu step   Assessment   Treatment Assessment pt engaged in 30 mins of skilled PT intervention where in he tolerated the nu step for 8 mins and engaged in repeated household distance gait training with VC to promote step through pattern and inc gait speed  no LOB but continues to be limited by notable fatigue   also initiatep HEP with educ provided to wife and pt to work on LE strengthening during downtime with wife providing supervision  both demo'd good carry over of proper technique  pt used pocket talker with speaker placed in abdominal binder and pt was able to engaged in conversation with appropriate responses noted  PT to work on strengthening, NMR for balance and gait to improve overall functional indep, endurance and dec risk for falls at d/c    Barriers to Discharge Inaccessible home environment;Decreased caregiver support   PT Barriers   Functional Limitation Car transfers; Ramp negotiation;Stair negotiation;Standing;Transfers; Walking   Plan   Treatment/Interventions Functional transfer training;LE strengthening/ROM; Therapeutic exercise; Endurance training;Gait training;Spoke to nursing;Spoke to MD;Spoke to case management;Spoke to advanced practitioner;OT   Progress Progressing toward goals   Recommendation   PT Discharge Recommendation   (TBD pending progress)   PT - OK to Discharge No   PT Therapy Minutes   PT Time In 1500   PT Time Out 1540   PT Total Time (minutes) 40   PT Mode of treatment - Individual (minutes) 40   PT Mode of treatment - Concurrent (minutes) 0   PT Mode of treatment - Group (minutes) 0 PT Mode of treatment - Co-treat (minutes) 0   PT Mode of Treatment - Total time(minutes) 40 minutes   PT Cumulative Minutes 225

## 2022-06-09 NOTE — PROGRESS NOTES
06/09/22 0900   Pain Assessment   Pain Assessment Tool 0-10   Pain Score No Pain   Restrictions/Precautions   Precautions Bed/chair alarms;Cognitive; Fall Risk;Hard of hearing;Supervision on toilet/commode   Weight Bearing Restrictions No   ROM Restrictions No   Braces or Orthoses Other (Comment)  (thigh high TEDS and abd binder, pocket talker)   Cognition   Overall Cognitive Status Impaired   Arousal/Participation Alert; Cooperative   Attention Attends with cues to redirect   Orientation Level Oriented X4   Memory Decreased short term memory   Following Commands Follows one step commands with increased time or repetition   Comments pt  very [place], utilized white board nd pocket talker  Sit to Stand   Type of Assistance Needed Physical assistance;Verbal cues; Adaptive equipment   Physical Assistance Level 26%-50%   Comment tactile cues for hand placement   Sit to Stand CARE Score 3   Bed-Chair Transfer   Type of Assistance Needed Physical assistance   Physical Assistance Level 26%-50%   Comment HHA on R   Chair/Bed-to-Chair Transfer CARE Score 3   Transfer Bed/Chair/Wheelchair   Adaptive Equipment None   Car Transfer   Reason if not Attempted Safety concerns   Car Transfer CARE Score 88   Walk 10 Feet   Type of Assistance Needed Physical assistance;Verbal cues   Physical Assistance Level 26%-50%   Comment min to modA   Walk 10 Feet CARE Score 3   Walk 50 Feet with Two Turns   Type of Assistance Needed Physical assistance;Verbal cues   Physical Assistance Level 26%-50%   Comment min to modA   Walk 50 Feet with Two Turns CARE Score 3   Walk 150 Feet   Reason if not Attempted Safety concerns   Walk 150 Feet CARE Score 88   Walking 10 Feet on Uneven Surfaces   Comment (S)  perform next session   Ambulation   Does the patient walk? 2   Yes   Primary Mode of Locomotion Prior to Admission Walk   Distance Walked (feet) 75 ft  (x3, 50' x2)   Assist Device Hand Hold  (to R)   Gait Pattern Slow [place];Decreased foot clearance; Forward Flexion;Narrow ILIANA;Shuffle;Improper weight shift   Limitations Noted In Balance; Coordination; Endurance; Heel Strike;Posture; Safety;Speed;Strength;Swing   Provided Assistance with: Balance;Direction   Walk Assist Level Minimum Assist;Moderate Assist   Wheel 50 Feet with Two Turns   Reason if not Attempted Activity not applicable   Wheel 50 Feet with Two Turns CARE Score 9   Wheel 150 Feet   Reason if not Attempted Activity not applicable   Wheel 997 Feet CARE Score 9   Wheelchair mobility   Does the patient use a wheelchair? 0  No   Curb or Single Stair   Style negotiated Single stair   Type of Assistance Needed Physical assistance;Verbal cues; Adaptive equipment   Physical Assistance Level 26%-50%   Comment BHR's on 6" trainers   1 Step (Curb) CARE Score 3   12 Steps   Reason if not Attempted Safety concerns   12 Steps CARE Score 88   Stairs   Type Stairs   # of Steps 2   Weight Bearing Precautions Fall Risk   Assist Devices Bilateral Rail   Findings descending fwd, pt became dizzy and required WC quickly  Picking Up Object   Reason if not Attempted Safety concerns   Picking Up Object CARE Score 88   Toilet Transfer   Comment utilized urinal in stance with DEREK for balance  Equipment Use   NuStep L1 x8 min, pt limited by fatigue  Other Comments   Comments ortho BP's seated 136/72, standing 113/61  pt overall asymptomatic except during stair trial    Assessment   Treatment Assessment Pt participated in skilled PT session with increased focus on gait, stair negotiation, increased act tolerance and endurance  Pt remained limited by fatigue requiring increased rest breaks  Pt initially refused use of pocket talker during session but with family present he agreed to switching out talker, stating he can hear out of it now  No overt LOB or misstep noted with gait but pt remains to have shuffled narrow ILIANA   Pt will cont to benefit from increased functional transfers, increased gait speed, increased balance and increased coordination  Cont POC as tolerated  Problem List Decreased strength;Decreased range of motion;Decreased endurance;Decreased mobility; Impaired balance;Decreased coordination; Impaired judgement;Decreased safety awareness; Impaired tone   Barriers to Discharge Decreased caregiver support; Inaccessible home environment   PT Barriers   Functional Limitation Car transfers;Stair negotiation;Standing;Transfers; Walking   Plan   Treatment/Interventions Functional transfer training;LE strengthening/ROM; Therapeutic exercise; Endurance training;Patient/family training;Gait training   Progress Progressing toward goals   Recommendation   PT Discharge Recommendation Home with home health rehabilitation   Equipment Recommended   (TBD)   PT Therapy Minutes   PT Time In 0900   PT Time Out 1000   PT Total Time (minutes) 60   PT Mode of treatment - Individual (minutes) 60   PT Mode of treatment - Concurrent (minutes) 0   PT Mode of treatment - Group (minutes) 0   PT Mode of treatment - Co-treat (minutes) 0   PT Mode of Treatment - Total time(minutes) 60 minutes   PT Cumulative Minutes 185   Therapy Time missed   Time missed?  No

## 2022-06-09 NOTE — PROGRESS NOTES
06/09/22 0800   Clinical Encounter Type   Visited With Family   Routine Visit Follow-up  (Pastoral visit with Fr [place] Guevara)

## 2022-06-09 NOTE — PROGRESS NOTES
06/09/22 0700   Pain Assessment   Pain Assessment Tool 0-10   Pain Score No Pain   Restrictions/Precautions   Precautions Bed/chair alarms;Cognitive; Fall Risk;Hard of hearing;Supervision on toilet/commode   Lifestyle   Autonomy "My wife is good"   Oral Hygiene   Type of Assistance Needed Physical assistance   Physical Assistance Level 26%-50%   Comment pt removes partial plate but requires assist to set up denture cup w/ soaking tablet  Seated pt brushes mouth w/ toothbrush but refuses toothpaste and mouthwash  Unable to trial in stance d/t orthostatic BPs   Oral Hygiene CARE Score 3   Grooming   Findings CS-[place] while seated  Not safe to attempt in stance d/t low BP w/ symptoms in stance  Ultimately pt requried [place] to use own electric razor for thoroughness  Shower/Bathe Self   Type of Assistance Needed Physical assistance   Physical Assistance Level 26%-50%   Comment Assist buttocks in stance and balance assist provided  When in stance thigh high TEDs and ABD binder donned  Shower/Bathe Self CARE Score 3   Bathing   Assessed Bath Style Sponge Bath   Tub/Shower Transfer   Reason Not Assessed Sponge Bath;Medical  (orthostatic and symptomatic)   Upper Body Dressing   Type of Assistance Needed Physical assistance   Physical Assistance Level 26%-50%   Comment ultimately requried [place] for doffing and donning d/t dec problem solving   Upper Body Dressing CARE Score 3   Lower Body Dressing   Type of Assistance Needed Physical assistance   Physical Assistance Level 26%-50%   Comment [place] to thread BLE w/ visual demo to dangle pant legs at feet, [place] for CM in stance at sink   Lower Body Dressing CARE Score 3   Putting On/Taking Off Footwear   Type of Assistance Needed Physical assistance   Physical Assistance Level 51%-75%   Comment Asssit to don thigh high TEDs and partial assist to don boots, assist to tie     Putting On/Taking Off Footwear CARE Score 2   Sit to Stand   Type of Assistance Needed Physical assistance   Physical Assistance Level 26%-50%   Comment Aries w/ tactile cues and visual demo   Sit to Stand CARE Score 3   Cognition   Overall Cognitive Status Impaired   Arousal/Participation Cooperative   Attention Attends with cues to redirect   Orientation Level Oriented X4   Memory Decreased short term memory;Decreased recall of precautions   Following Commands Follows one step commands with increased time or repetition   Comments very [place], use of white board w/ simple phrases utilized for majority of session vs pocket talker d/t pt preference  Pt at times will shake head y/n inccorectly when responding to questions and can be seen correcting himself  Activity Tolerance   Activity Tolerance Patient tolerated treatment well   Assessment   Treatment Assessment OT session focusing on basic ADL completion  Pt preferred to use white board for communication this session vs headphone/hearing amplifier  BP as follows: supine 107/72, seated w/ thigh high TEDs and ABD binder 113/74, stance w/ TEDs and ABD binder 96/66 w/ reported dizziness  Later in session seated w/ TEDs and /66  Pt continues to require skilled hands on assist in order to maintain his safety  Will continue to benefit from OT intervention following established POC  Problem List Decreased range of motion;Decreased strength;Decreased endurance; Impaired balance;Decreased mobility; Decreased coordination;Decreased cognition; Impaired judgement;Decreased safety awareness; Impaired hearing   Plan   Treatment/Interventions ADL retraining;Functional transfer training; Therapeutic exercise; Endurance training;Patient/family training;Equipment eval/education;Cognitive reorientation; Compensatory technique education;Continued evaluation   Progress Progressing toward goals   Recommendation   OT Discharge Recommendation   (pending progress)   OT Therapy Minutes   OT Time In 0700   OT Time Out 0830   OT Total Time (minutes) 90   OT Mode of treatment - Individual (minutes) 90   OT Mode of treatment - Concurrent (minutes) 0   OT Mode of treatment - Group (minutes) 0   OT Mode of treatment - Co-treat (minutes) 0   OT Mode of Treatment - Total time(minutes) 90 minutes   OT Cumulative Minutes 270   Therapy Time missed   Time missed?  No

## 2022-06-09 NOTE — PROGRESS NOTES
Internal Medicine Progress Note  Patient: Eugene Lucas  Age/sex: 79 y o  male  Medical Record #: [id number]      ASSESSMENT/PLAN: (Interval History)  Eugene Lucas is seen and examined and management for following issues:    SDH s/p Lt craniotomy  · Monitor incisions  · Follow-up with Neurosurgery in 2 weeks  · S/p Keppra for 7 days for seizure prophylaxis      Hyperlipidemia  · Continue statin  · Low cholesterol diet  Urinary retention  · Was on placed on flomax on acute side  · Now with orthostasis  · dc'd flomax 6/7  · Has required straight cath    Orthostasis  · Encourage fluids  · Off flomax as above  · Despite thigh high teds/abdominal binder c/w orthostasis  · Add low dose midodrine in the am and prn dose at lunch time    [facility] [place]  · Assistive device at bedside    DC planning: reteam    The above assessment and plan was reviewed and updated as determined by my evaluation of the patient on 6/9/2022      Labs:   Results from last 7 days   Lab Units 06/06/22  0508 06/05/22  0449   WBC Thousand/uL 5 16 5 26   HEMOGLOBIN g/dL 13 3 13 5   HEMATOCRIT % 38 6 39 0   PLATELETS Thousands/uL 432* 403*     Results from last 7 days   Lab Units 06/06/22  0508 06/05/22  0449   SODIUM mmol/L 138 136   POTASSIUM mmol/L 4 0 3 8   CHLORIDE mmol/L 105 102   CO2 mmol/L 29 28   BUN mg/dL 15 14   CREATININE mg/dL 0 70 0 68   CALCIUM mg/dL 9 3 9 1                   Review of Scheduled Meds:  Current Facility-Administered Medications   Medication Dose Route Frequency Provider Last Rate    acetaminophen  650 mg Oral Q6H PRN Bossman Salcido MD      bisacodyl  10 mg Rectal Daily PRN Bossman Salcido MD      heparin (porcine)  5,000 Units Subcutaneous Q8H [facility] Bossman Salcido MD      melatonin  6 mg Oral HS Bossman Salcido MD      oxyCODONE  5 mg Oral Q4H PRN Bossman Salcido MD      Or   Malorie Drop oxyCODONE  2 5 mg Oral Q4H PRN Bossman Salcido MD      pravastatin  40 mg Oral Daily With Darshan Dorado MD         Subjective/ HPI: Patient seen and examined  Patients overnight issues or events were reviewed with nursing or staff during rounds or morning huddle session  New or overnight issues include the following:     Pt seen and examined  Applied assistive hearing device  No complaints overnight, does c/o dizziness with therapy  ROS:   A 10 point ROS was performed; negative except as noted above  Imaging:     No orders to display       *Labs /Radiology studies Reviewed  *Medications  reviewed and reconciled as needed  *Please refer to order section for additional ordered labs studies  *Case discussed with primary attending during morning huddle case rounds    Physical Examination:  Vitals:   Vitals:    06/08/22 1605 06/08/22 2125 06/09/22 0545 06/09/22 0559   BP: 119/68 133/67 110/64    BP Location: Left arm  Left arm    Pulse: 76 95 88    Resp: 16 18 16    Temp: 97 6 °F (36 4 °C) 99 5 °F (37 5 °C) 97 7 °F (36 5 °C)    TempSrc: Oral Oral Oral    SpO2: 97% 96% 95%    Weight:    74 8 kg (164 lb 12 8 oz)   Height:           GEN: No apparent distress, interactive  NEURO: Alert and oriented x3; [place]  HEENT: Pupils are equal and reactive, EOMI, mucous membranes are moist, face symmetrical  CV: S1 S2 regular, no MRG, no peripheral edema noted  RESP: Lungs are clear bilaterally, no wheezes, rales or rhonchi noted, on room air, respirations easy and non labored  GI: Flat, soft non tender, non distended; +BS x4  : Voiding without difficulty  MUSC: Moves all extremities  SKIN: pink, warm and dry, normal turgor, incisions intact      The above physical exam was reviewed and updated as determined by my evaluation of the patient on 6/9/2022      Invasive Devices  Report    Peripheral Intravenous Line  Duration           Peripheral IV 06/06/22 Proximal;Right;Ventral (anterior) Forearm 3 days                   VTE Pharmacologic Prophylaxis: Heparin  Code Status: Level 1 - Full Code  Current Length of Stay: 3 day(s)      Total time spent:  30 minutes with more than 50% spent counseling/coordinating care  Counseling includes discussion with patient re: progress  and discussion with patient of his/her current medical state/information  Coordination of patient's care was performed in conjunction with primary service  Time invested included review of patient's labs, vitals, and management of their comorbidities with continued monitoring  In addition, this patient was discussed with medical team including physician and advanced extenders  The care of the patient was extensively discussed and appropriate treatment plan was formulated unique for this patient  ** Please Note:  voice to text software may have been used in the creation of this document   Although proof errors in transcription or interpretation are a potential of such software**

## 2022-06-09 NOTE — PLAN OF CARE
Reviewed    Problem: PAIN - ADULT  Goal: Verbalizes/displays adequate comfort level or baseline comfort level  Description: Interventions:  - Encourage patient to monitor pain and request assistance  - Assess pain using appropriate pain scale  - Administer analgesics based on type and severity of pain and evaluate response  - Implement non-pharmacological measures as appropriate and evaluate response  - Consider cultural and social influences on pain and pain management  - Notify physician/advanced practitioner if interventions unsuccessful or patient reports new pain  Outcome: Progressing     Problem: INFECTION - ADULT  Goal: Absence or prevention of progression during hospitalization  Description: INTERVENTIONS:  - Assess and monitor for signs and symptoms of infection  - Monitor lab/diagnostic results  - Monitor all insertion sites, i e  indwelling lines, tubes, and drains  - Monitor endotracheal if appropriate and nasal secretions for changes in amount and color  - [place] appropriate cooling/warming therapies per order  - Administer medications as ordered  - Instruct and encourage patient and family to use good hand hygiene technique  - Identify and instruct in appropriate isolation precautions for identified infection/condition  Outcome: Progressing  Goal: Absence of fever/infection during neutropenic period  Description: INTERVENTIONS:  - Monitor WBC    Outcome: Progressing     Problem: SAFETY ADULT  Goal: Patient will remain free of falls  Description: INTERVENTIONS:  - Educate patient/family on patient safety including physical limitations  - Instruct patient to call for assistance with activity   - Consult OT/PT to assist with strengthening/mobility   - Keep Call bell within reach  - Keep bed low and locked with side rails adjusted as appropriate  - Keep care items and personal belongings within reach  - Initiate and maintain comfort rounds  - Make Fall Risk Sign visible to staff  - Offer Toileting every 4 Hours, in advance of need  - Initiate/Maintain bed and chair alarm  - Apply yellow socks and bracelet for high fall risk patients  - Consider moving patient to room near nurses station  Outcome: Progressing  Goal: Maintain or return to baseline ADL function  Description: INTERVENTIONS:  -  Assess patient's ability to carry out ADLs; assess patient's baseline for ADL function and identify physical deficits which impact ability to perform ADLs (bathing, care of mouth/teeth, toileting, grooming, dressing, etc )  - Assess/evaluate cause of self-care deficits   - Assess range of motion  - Assess patient's mobility; develop plan if impaired  - Assess patient's need for assistive devices and provide as appropriate  - Encourage maximum independence but intervene and supervise when necessary  - Involve family in performance of ADLs  - Assess for home care needs following discharge   - Consider OT consult to assist with ADL evaluation and planning for discharge  - Provide patient education as appropriate  Outcome: Progressing  Goal: Maintains/Returns to pre admission functional level  Description: INTERVENTIONS:  - Set and communicate daily mobility goal to care team and patient/family/caregiver     - Collaborate with rehabilitation services on mobility goals if consulted  - Out of bed for toileting  - Record patient progress and toleration of activity level   Outcome: Progressing     Problem: DISCHARGE PLANNING  Goal: Discharge to home or other facility with appropriate resources  Description: INTERVENTIONS:  - Identify barriers to discharge w/patient and caregiver  - Arrange for needed discharge resources and transportation as appropriate  - Identify discharge learning needs (meds, wound care, etc )  - Arrange for interpretive services to assist at discharge as needed  - Refer to Case Management Department for coordinating discharge planning if the patient needs post-hospital services based on physician/advanced practitioner order or complex needs related to functional status, cognitive ability, or social support system  Outcome: Progressing     Problem: Prexisting or High Potential for Compromised Skin Integrity  Goal: Skin integrity is maintained or improved  Description: INTERVENTIONS:  - Identify patients at risk for skin breakdown  - Assess and monitor skin integrity  - Assess and monitor nutrition and hydration status  - Monitor labs   - Assess for incontinence   - Turn and reposition patient  - Assist with mobility/ambulation  - Relieve pressure over bony prominences  - Avoid friction and shearing  - Provide appropriate hygiene as needed including keeping skin clean and dry  - Evaluate need for skin moisturizer/barrier cream  - Collaborate with interdisciplinary team   - Patient/family teaching  - Consider wound care consult   Outcome: Progressing     Problem: Potential for Falls  Goal: Patient will remain free of falls  Description: INTERVENTIONS:  - Educate patient/family on patient safety including physical limitations  - Instruct patient to call for assistance with activity   - Consult OT/PT to assist with strengthening/mobility   - Keep Call bell within reach  - Keep bed low and locked with side rails adjusted as appropriate  - Keep care items and personal belongings within reach  - Initiate and maintain comfort rounds  - Make Fall Risk Sign visible to staff  - Offer Toileting every 4 Hours, in advance of need  - Initiate/Maintain bed and chair alarm  - Apply yellow socks and bracelet for high fall risk patients  - Consider moving patient to room near nurses station  Outcome: Progressing     Problem: Nutrition/Hydration-ADULT  Goal: Nutrient/Hydration intake appropriate for improving, restoring or maintaining nutritional needs  Description: Monitor and assess patient's nutrition/hydration status for malnutrition  Collaborate with interdisciplinary team and initiate plan and interventions as ordered    Monitor patient's weight and dietary intake as ordered or per policy  Utilize nutrition screening tool and intervene as necessary  Determine patient's food preferences and provide high-protein, high-caloric foods as appropriate       INTERVENTIONS:  - Monitor oral intake, urinary output, labs, and treatment plans  - Assess nutrition and hydration status and recommend course of action  - Evaluate amount of meals eaten  - Assist patient with eating if necessary   - Allow adequate time for meals  - Recommend/ encourage appropriate diets, oral nutritional supplements, and vitamin/mineral supplements  - Order, calculate, and assess calorie counts as needed  - Recommend, monitor, and adjust tube feedings and TPN/PPN based on assessed needs  - Assess need for intravenous fluids  - Provide specific nutrition/hydration education as appropriate  - Include patient/family/caregiver in decisions related to nutrition  Outcome: Progressing

## 2022-06-10 PROCEDURE — 97116 GAIT TRAINING THERAPY: CPT

## 2022-06-10 PROCEDURE — 97129 THER IVNTJ 1ST 15 MIN: CPT

## 2022-06-10 PROCEDURE — 97530 THERAPEUTIC ACTIVITIES: CPT

## 2022-06-10 PROCEDURE — 99232 SBSQ HOSP IP/OBS MODERATE 35: CPT | Performed by: INTERNAL MEDICINE

## 2022-06-10 PROCEDURE — 97535 SELF CARE MNGMENT TRAINING: CPT

## 2022-06-10 PROCEDURE — 99232 SBSQ HOSP IP/OBS MODERATE 35: CPT | Performed by: PHYSICAL MEDICINE & REHABILITATION

## 2022-06-10 PROCEDURE — 97112 NEUROMUSCULAR REEDUCATION: CPT

## 2022-06-10 PROCEDURE — 97110 THERAPEUTIC EXERCISES: CPT

## 2022-06-10 PROCEDURE — 92507 TX SP LANG VOICE COMM INDIV: CPT

## 2022-06-10 RX ADMIN — ACETAMINOPHEN 650 MG: 325 TABLET, FILM COATED ORAL at 05:44

## 2022-06-10 RX ADMIN — PRAVASTATIN SODIUM 40 MG: 40 TABLET ORAL at 17:24

## 2022-06-10 RX ADMIN — HEPARIN SODIUM 5000 UNITS: 5000 INJECTION INTRAVENOUS; SUBCUTANEOUS at 21:16

## 2022-06-10 RX ADMIN — CARBIDOPA AND LEVODOPA 2.5 MG: 50; 200 TABLET, EXTENDED RELEASE ORAL at 11:43

## 2022-06-10 RX ADMIN — HEPARIN SODIUM 5000 UNITS: 5000 INJECTION INTRAVENOUS; SUBCUTANEOUS at 05:43

## 2022-06-10 RX ADMIN — Medication 6 MG: at 21:16

## 2022-06-10 RX ADMIN — CARBIDOPA AND LEVODOPA 2.5 MG: 50; 200 TABLET, EXTENDED RELEASE ORAL at 08:24

## 2022-06-10 RX ADMIN — HEPARIN SODIUM 5000 UNITS: 5000 INJECTION INTRAVENOUS; SUBCUTANEOUS at 14:08

## 2022-06-10 NOTE — PLAN OF CARE
Problem: PAIN - ADULT  Goal: Verbalizes/displays adequate comfort level or baseline comfort level  Description: Interventions:  - Encourage patient to monitor pain and request assistance  - Assess pain using appropriate pain scale  - Administer analgesics based on type and severity of pain and evaluate response  - Implement non-pharmacological measures as appropriate and evaluate response  - Consider cultural and social influences on pain and pain management  - Notify physician/advanced practitioner if interventions unsuccessful or patient reports new pain  Outcome: Progressing     Problem: INFECTION - ADULT  Goal: Absence or prevention of progression during hospitalization  Description: INTERVENTIONS:  - Assess and monitor for signs and symptoms of infection  - Monitor lab/diagnostic results  - Monitor all insertion sites, i e  indwelling lines, tubes, and drains  - Monitor endotracheal if appropriate and nasal secretions for changes in amount and color  - [place] appropriate cooling/warming therapies per order  - Administer medications as ordered  - Instruct and encourage patient and family to use good hand hygiene technique  - Identify and instruct in appropriate isolation precautions for identified infection/condition  Outcome: Progressing  Goal: Absence of fever/infection during neutropenic period  Description: INTERVENTIONS:  - Monitor WBC    Outcome: Progressing     Problem: SAFETY ADULT  Goal: Patient will remain free of falls  Description: INTERVENTIONS:  - Educate patient/family on patient safety including physical limitations  - Instruct patient to call for assistance with activity   - Consult OT/PT to assist with strengthening/mobility   - Keep Call bell within reach  - Keep bed low and locked with side rails adjusted as appropriate  - Keep care items and personal belongings within reach  - Initiate and maintain comfort rounds  - Make Fall Risk Sign visible to staff  - Offer Toileting every  Hours, in advance of need  - Initiate/Maintain alarm  - Obtain necessary fall risk management equipment:   - Apply yellow socks and bracelet for high fall risk patients  - Consider moving patient to room near nurses station  Outcome: Progressing  Goal: Maintain or return to baseline ADL function  Description: INTERVENTIONS:  -  Assess patient's ability to carry out ADLs; assess patient's baseline for ADL function and identify physical deficits which impact ability to perform ADLs (bathing, care of mouth/teeth, toileting, grooming, dressing, etc )  - Assess/evaluate cause of self-care deficits   - Assess range of motion  - Assess patient's mobility; develop plan if impaired  - Assess patient's need for assistive devices and provide as appropriate  - Encourage maximum independence but intervene and supervise when necessary  - Involve family in performance of ADLs  - Assess for home care needs following discharge   - Consider OT consult to assist with ADL evaluation and planning for discharge  - Provide patient education as appropriate  Outcome: Progressing  Goal: Maintains/Returns to pre admission functional level  Description: INTERVENTIONS:  - Perform BMAT or MOVE assessment daily    - Set and communicate daily mobility goal to care team and patient/family/caregiver  - Collaborate with rehabilitation services on mobility goals if consulted  - Perform Range of Motion  times a day  - Reposition patient every  hours    - Dangle patient  times a day  - Stand patient  times a day  - Ambulate patient  times a day  - Out of bed to chair  times a day   - Out of bed for meals  times a day  - Out of bed for toileting  - Record patient progress and toleration of activity level   Outcome: Progressing     Problem: DISCHARGE PLANNING  Goal: Discharge to home or other facility with appropriate resources  Description: INTERVENTIONS:  - Identify barriers to discharge w/patient and caregiver  - Arrange for needed discharge resources and transportation as appropriate  - Identify discharge learning needs (meds, wound care, etc )  - Arrange for interpretive services to assist at discharge as needed  - Refer to Case Management Department for coordinating discharge planning if the patient needs post-hospital services based on physician/advanced practitioner order or complex needs related to functional status, cognitive ability, or social support system  Outcome: Progressing     Problem: Prexisting or High Potential for Compromised Skin Integrity  Goal: Skin integrity is maintained or improved  Description: INTERVENTIONS:  - Identify patients at risk for skin breakdown  - Assess and monitor skin integrity  - Assess and monitor nutrition and hydration status  - Monitor labs   - Assess for incontinence   - Turn and reposition patient  - Assist with mobility/ambulation  - Relieve pressure over bony prominences  - Avoid friction and shearing  - Provide appropriate hygiene as needed including keeping skin clean and dry  - Evaluate need for skin moisturizer/barrier cream  - Collaborate with interdisciplinary team   - Patient/family teaching  - Consider wound care consult   Outcome: Progressing     Problem: Potential for Falls  Goal: Patient will remain free of falls  Description: INTERVENTIONS:  - Educate patient/family on patient safety including physical limitations  - Instruct patient to call for assistance with activity   - Consult OT/PT to assist with strengthening/mobility   - Keep Call bell within reach  - Keep bed low and locked with side rails adjusted as appropriate  - Keep care items and personal belongings within reach  - Initiate and maintain comfort rounds  - Make Fall Risk Sign visible to staff  - Offer Toileting every  Hours, in advance of need  - Initiate/Maintain alarm  - Obtain necessary fall risk management equipment  - Apply yellow socks and bracelet for high fall risk patients  - Consider moving patient to room near nurses station  Outcome: Progressing     Problem: Nutrition/Hydration-ADULT  Goal: Nutrient/Hydration intake appropriate for improving, restoring or maintaining nutritional needs  Description: Monitor and assess patient's nutrition/hydration status for malnutrition  Collaborate with interdisciplinary team and initiate plan and interventions as ordered  Monitor patient's weight and dietary intake as ordered or per policy  Utilize nutrition screening tool and intervene as necessary  Determine patient's food preferences and provide high-protein, high-caloric foods as appropriate       INTERVENTIONS:  - Monitor oral intake, urinary output, labs, and treatment plans  - Assess nutrition and hydration status and recommend course of action  - Evaluate amount of meals eaten  - Assist patient with eating if necessary   - Allow adequate time for meals  - Recommend/ encourage appropriate diets, oral nutritional supplements, and vitamin/mineral supplements  - Order, calculate, and assess calorie counts as needed  - Recommend, monitor, and adjust tube feedings and TPN/PPN based on assessed needs  - Assess need for intravenous fluids  - Provide specific nutrition/hydration education as appropriate  - Include patient/family/caregiver in decisions related to nutrition  Outcome: Progressing

## 2022-06-10 NOTE — PLAN OF CARE
Problem: INFECTION - ADULT  Goal: Absence or prevention of progression during hospitalization  Description: INTERVENTIONS:  - Assess and monitor for signs and symptoms of infection  - Monitor lab/diagnostic results  - Monitor all insertion sites, i e  indwelling lines, tubes, and drains  - Monitor endotracheal if appropriate and nasal secretions for changes in amount and color  - [place] appropriate cooling/warming therapies per order  - Administer medications as ordered  - Instruct and encourage patient and family to use good hand hygiene technique  - Identify and instruct in appropriate isolation precautions for identified infection/condition  Outcome: Progressing

## 2022-06-10 NOTE — TELEPHONE ENCOUNTER
06/14/2022-PT STILL IN HOSPITAL  PER DESSIREE TIGER TEXT: You can cancel   06/15-2 [facility] POV CANCELLED  CT ORDERED INPT   07/07/2022-6 [facility] POV/VIRTUAL/W CTH  PLEASE KEEP WATCH FOR 2 [facility] POV NOTE TO BE PLACED IN CHART       06/13/2022-PT STILL IN HOSPITAL    SENT TIGER TEXT TO [facility] WILL ORDER CT HEAD       06/10/2022-PT STILL IN HOSPITAL  06/15/2022 VIRTUAL APT W/CT HEAD

## 2022-06-10 NOTE — PROGRESS NOTES
06/10/22 0900   Pain Assessment   Pain Score No Pain   Restrictions/Precautions   Precautions Bed/chair alarms; Fall Risk;Hard of hearing;Supervision on toilet/commode  (orthostatic BP)   Braces or Orthoses   (abdominal binder and thigh high TEDS)   Cognition   Arousal/Participation Alert; Cooperative   Attention Attends with cues to redirect   Memory Decreased recall of precautions;Decreased short term memory   Following Commands Follows one step commands with increased time or repetition   Comments Used pocket talker majority of session and pt  able to respond well   Subjective   Subjective Pt  denies dizziness and pain  Orhto BP assessed at start of session and are reflected on vitaks section   Sit to Stand   Type of Assistance Needed Physical assistance   Physical Assistance Level 25% or less   Comment tactle and verbal cues for hand placement , no AD   Sit to Stand CARE Score 3   Bed-Chair Transfer   Type of Assistance Needed Physical assistance   Physical Assistance Level 25% or less   Comment tactle and verbal cues for hand placement , no AD   Chair/Bed-to-Chair Transfer CARE Score 3   Transfer Bed/Chair/Wheelchair   Adaptive Equipment None  (gait belt)   Car Transfer   Type of Assistance Needed Physical assistance   Physical Assistance Level 26%-50%   Comment mod A,   Car Transfer CARE Score 3   Walk 10 Feet   Type of Assistance Needed Physical assistance   Physical Assistance Level 26%-50%   Comment using L HHA   Walk 10 Feet CARE Score 3   Walk 50 Feet with Two Turns   Type of Assistance Needed Physical assistance   Physical Assistance Level 26%-50%   Comment using L HHA   Walk 50 Feet with Two Turns CARE Score 3   Walk 150 Feet   Type of Assistance Needed Physical assistance   Physical Assistance Level Total assistance   Comment min HHA X 2 for NPP   Walk 150 Feet CARE Score 1   Ambulation   Does the patient walk? 2   Yes   Primary Mode of Locomotion Prior to Admission Walk   Distance Walked (feet) 75 ft  (X 2 with L HHA, 75 feet with RW, 150 feet 2  with B HHA)   Assist Device Hand Hold  (single and B, also walked with RW X 75 feet)   Gait Pattern Inconsistant [place];Narrow ILIANA  (dec stride length)   Limitations Noted In Endurance;Balance; Coordination   Provided Assistance with: Balance;Direction   Walk Assist Level Minimum Assist;Assist x 1  (to A X 2)   Wheel 50 Feet with Two Turns   Reason if not Attempted Activity not applicable   Wheel 50 Feet with Two Turns CARE Score 9   Wheel 150 Feet   Reason if not Attempted Activity not applicable   Wheel 015 Feet CARE Score 9   Wheelchair mobility   Does the patient use a wheelchair? 0  No   Curb or Single Stair   Style negotiated Single stair   Type of Assistance Needed Physical assistance   Physical Assistance Level 26%-50%   Comment using B HR   1 Step (Curb) CARE Score 3   4 Steps   Type of Assistance Needed Physical assistance   Physical Assistance Level 26%-50%   Comment using B HR   4 Steps CARE Score 3   12 Steps   Reason if not Attempted Safety concerns   12 Steps CARE Score 88   Stairs   Type Stairs   # of Steps 4   Weight Bearing Precautions Fall Risk   Assist Devices Bilateral Rail   Toilet Transfer   Findings stood to use urinal at end of session, preferred to stand this time as he had a hard time urinating when sitting on toilet as per wife   Therapeutic Interventions   Strengthening seated add squeeze with ball, hip abd with orange TB, LAQ with 2# wts, standing marching in place with RW   Flexibility B hamstring and gastroc stretching   Neuromuscular Re-Education B HHA ambulation X 150 feet X 2 to inc gait speed and stride length  pt  responded well  Equipment Use   NuStep Level 2 X 10 mins spm 50-60 HR 98 bpm after nu step, Spo2 96%   Assessment   Treatment Assessment Pt  engaged in 90 mins PT session and was able to tolerate above activities with rest break as needed  Pt  responding well to commands using pocket talker   Pt  demonstrates improved functional mobility to mostly min A but cont to need tactile and verbal cues for safe technqiues  No major LOB noted during session and pt  denied dizziness or ligtheadedness with activities  Pt  assisted to recliner with alarms on and call bell in place ate nd of tx  Family/Caregiver Present yes, wife at end of session   Problem List Decreased strength;Decreased endurance; Impaired balance;Decreased mobility; Decreased coordination;Decreased cognition;Decreased safety awareness; Impaired hearing   Barriers to Discharge Inaccessible home environment;Decreased caregiver support   PT Barriers   Functional Limitation Car transfers;Stair negotiation;Standing;Transfers; Walking   Plan   Treatment/Interventions Functional transfer training;LE strengthening/ROM; Elevations; Therapeutic exercise; Endurance training;Patient/family training;Equipment eval/education;Gait training;Bed mobility   Recommendation   PT Discharge Recommendation Home with outpatient rehabilitation  (vs home health)   Equipment Recommended   (LRAD)   PT Therapy Minutes   PT Time In 0900   PT Time Out 1030   PT Total Time (minutes) 90   PT Mode of treatment - Individual (minutes) 75   PT Mode of treatment - Concurrent (minutes) 15   PT Mode of treatment - Group (minutes) 0   PT Mode of treatment - Co-treat (minutes) 0   PT Mode of Treatment - Total time(minutes) 90 minutes   PT Cumulative Minutes 315   Therapy Time missed   Time missed?  No

## 2022-06-10 NOTE — PROGRESS NOTES
Internal Medicine Progress Note  Patient: Curly Nina  Age/sex: 79 y o  male  Medical Record #: [id number]      ASSESSMENT/PLAN: (Interval History)  Curly Nina is seen and examined and management for following issues:    SDH s/p Lt craniotomy  · Monitor incisions  · Follow-up with Neurosurgery in 2 weeks  · S/p Keppra for 7 days for seizure prophylaxis      Hyperlipidemia  · Continue statin  · Low cholesterol diet  Urinary retention  · Was on placed on flomax on acute side  · Now with orthostasis  · dc'd flomax 6/7  · No SC in past 48 hours    Orthostasis  · Encourage fluids  · Off flomax as above  · Despite thigh high teds/abdominal binder c/w orthostasis  · Add low dose midodrine in the am and prn dose at lunch time  · orthostasis improved with midodrine    [place]  · Assistive device at bedside    DC planning: reteam    The above assessment and plan was reviewed and updated as determined by my evaluation of the patient on 6/10/2022      Labs:   Results from last 7 days   Lab Units 06/06/22  0508 06/05/22  0449   WBC Thousand/uL 5 16 5 26   HEMOGLOBIN g/dL 13 3 13 5   HEMATOCRIT % 38 6 39 0   PLATELETS Thousands/uL 432* 403*     Results from last 7 days   Lab Units 06/06/22  0508 06/05/22  0449   SODIUM mmol/L 138 136   POTASSIUM mmol/L 4 0 3 8   CHLORIDE mmol/L 105 102   CO2 mmol/L 29 28   BUN mg/dL 15 14   CREATININE mg/dL 0 70 0 68   CALCIUM mg/dL 9 3 9 1                   Review of Scheduled Meds:  Current Facility-Administered Medications   Medication Dose Route Frequency Provider Last Rate    acetaminophen  650 mg Oral Q6H PRN Brayden Lang MD      bisacodyl  10 mg Rectal Daily PRN Baryden Lang MD      heparin (porcine)  5,000 Units Subcutaneous Q8H [address] Brayden Lang MD      melatonin  6 mg Oral HS Brayden Lang MD      midodrine  2 5 mg Oral Daily RONY Byrd      midodrine  2 5 mg Oral Daily With [address]  RONY Murphy      oxyCODONE  5 mg Oral Q4H PRN Brayden Lang MD      Or   Storm Garg oxyCODONE  2 5 mg Oral Q4H PRN Tanya Puri MD      pravastatin  40 mg Oral Daily With Navin Tapia MD         Subjective/ HPI: Patient seen and examined  Patients overnight issues or events were reviewed with nursing or staff during rounds or morning huddle session  New or overnight issues include the following:     Pt seen and examined in therapy, he denies dizziness and is ambulating in the hallway      ROS:   A 10 point ROS was performed; negative except as noted above  Imaging:     No orders to display       *Labs /Radiology studies Reviewed  *Medications  reviewed and reconciled as needed  *Please refer to order section for additional ordered labs studies  *Case discussed with primary attending during morning huddle case rounds    Physical Examination:  Vitals:   Vitals:    06/09/22 1503 06/09/22 2100 06/10/22 0533 06/10/22 0547   BP: 96/66 115/64  112/67   BP Location: Left arm   Left arm   Pulse: 94 98  82   Resp:  18  16   Temp:  98 2 °F (36 8 °C)  98 1 °F (36 7 °C)   TempSrc:    Oral   SpO2: 97% 95%  95%   Weight:   75 2 kg (165 lb 12 8 oz)    Height:           GEN: No apparent distress, interactive  NEURO: Alert and oriented x3; extremely [place]  HEENT: Pupils are equal and reactive, EOMI, mucous membranes are moist, face symmetrical  CV: S1 S2 regular, no MRG, no peripheral edema noted  RESP: Lungs are clear bilaterally, no wheezes, rales or rhonchi noted, on room air, respirations easy and non labored  GI: Flat, soft non tender, non distended; +BS x4  : Voiding without difficulty  MUSC: Moves all extremities; frail  SKIN: pink, warm and dry, normal turgor, incisions intact      The above physical exam was reviewed and updated as determined by my evaluation of the patient on 6/10/2022      Invasive Devices  Report    None                    VTE Pharmacologic Prophylaxis: Heparin  Code Status: Level 1 - Full Code  Current Length of Stay: 4 day(s)      Total time spent:  30 minutes with more than 50% spent counseling/coordinating care  Counseling includes discussion with patient re: progress  and discussion with patient of his/her current medical state/information  Coordination of patient's care was performed in conjunction with primary service  Time invested included review of patient's labs, vitals, and management of their comorbidities with continued monitoring  In addition, this patient was discussed with medical team including physician and advanced extenders  The care of the patient was extensively discussed and appropriate treatment plan was formulated unique for this patient  ** Please Note:  voice to text software may have been used in the creation of this document   Although proof errors in transcription or interpretation are a potential of such software**

## 2022-06-10 NOTE — PROGRESS NOTES
06/10/22 1100   Pain Assessment   Pain Assessment Tool 0-10   Pain Score No Pain   Restrictions/Precautions   Precautions Bed/chair alarms; Fall Risk;Hard of hearing;Supervision on toilet/commode   Comprehension   Comprehension (FIM) 5 - Understands basic directions and conversation   Expression   Expression (FIM) 4 - Expresses basic info/needs 75-90% of time   Social Interaction   Social Interaction (FIM) 5 - Interacts appropriately with others 90% of time   Problem Solving   Problem solving (FIM) 5 - Solves basic problems 90% of time   Memory   Memory (FIM) 5 - Needs cueing reminders <10%   Speech/Language/Cognition Assessmetn   Treatment Assessment Pt awake, alert and engaged for session, in which pt's wife was present but did leave for session so that pt would not be distracted by her  SLP engaging in generalized conversational speech in which pt was able to verbalize simple 1-2 words utterance given basic conversational speech  Used pocket talker for most of session today but when SLP needed to relay increased information, SLP used white board for improved comprehension given education  For example, SLP asked pt about prior therapy session, in which pt accurately reporting PT session and when asking about strength/balance, pt did state "not good yet" to where explained to pt about brain injury and how the brain controls motor functioning to where currently pt is still demonstrating decline in function  Pt verbalizing understanding of this written information today  Otherwise, focus of session was towards functional problem solving as well as attempting for pt to elicit longer phrases in verbal speech  SLP using the Safety in and Around the House picture cards, but only using the pictures which contain unsafe scenarios for attempting increased verbal output   When targeting pt's ability to recognize the unsafe situations in the pictures, pt was 9/11 accurate, noting minimal difficulty recognizing the finer details given the pictures  When SLP gave pt minimal prompts for naming the items in the picture, given increased time, pt then able to verbalize the unsafe situations in the pictures  As for pt to provide appropriate and safe solutions to these pictures, pt was 11/11 accurate  In regards to pt's speech output, pt continued to demonstrate 1-2 word utterances initially, needing moderate prompt to state more information, but as task progressed, pt was able to elicit longer verbal phrases and shorter sentences to communicate thoughts effectively given the task at hand  Wife provided update at end of session today and ongoing plan moving forward to continue to check cognitive skills but also increased communicative intent  At this time, pt to continue to benefit from ongoing skilled SLP services to maximize overall functional cognitive linguistic skills to decrease caregiver burden at time of discharge  SLP Therapy Minutes   SLP Time In 1100   SLP Time Out 1130   SLP Total Time (minutes) 30   SLP Mode of treatment - Individual (minutes) 30   SLP Mode of treatment - Concurrent (minutes) 0   SLP Mode of treatment - Group (minutes) 0   SLP Mode of treatment - Co-treat (minutes) 0   SLP Mode of Treatment - Total time(minutes) 30 minutes   SLP Cumulative Minutes 120   Therapy Time missed   Time missed?  No

## 2022-06-10 NOTE — PROGRESS NOTES
Physical Medicine and Rehabilitation Progress Note  Rob Shallow 79 y o  male MRN: [id number]  Unit/Bed#: -36 Encounter: [id number]        HPI: Patient is a 80 yo male with h/o fall and L SDH which was being monitored by neurosx with serial CTH however patient's recent OP CTH showed expansion of L SDH with increasing midline shift therefore patient underwent L craniotomy for SDH evacuation on 5/28 by Dr Janet Hastings  A L MMA embolization was attempted as well however was aborted due to anatomic variation that would not allow for the procedure  Chief Complaint: L SDH    Subjective: Patient seen at bedside this afternoon and updated patient on active medical issues     ROS: A 10 point ROS was performed; negative except as noted above       Assessment/Plan:    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 for DVT ppx and patient was started on HSQ in acute care on 6/3; OP FU with neurosx scheduled for 6/15 and 7/7 with FU CTH 2-3 days PTA per neurosx protocol ([address] x 2 already e-prescribed in EMR by neurosx team)     Dyslipidemia: therapeutic substitution for home zocor 20 mg qpm      Urinary retention: per d/w spouse appears to have symptoms of enlarged prostate at home, started on flomax in acute care however IM dc'd due to low BPs, checking PVRs (last 2 PVRs were 170 & 0)      Thrombocytosis: mild at 432 (), likely reactive, IM monitoring      h/o rt caudate lacunar infarct: incidental finding on imaging in acute care, therapeutic substitution for home zocor 20 mg qpm, not on AP/AC at home however not currently a candidate for AP/AC due to 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 per neurosx recs    Hypotension: started on midodrine by IM and is now improved      DVT ppx: HSQ (cleared for HSQ in acute care by neurosx and was started on HSQ in acute care on 6/3)     Incidental findings:     1) abnormalities on echocardiogram including but not limited to grade 1 DD & abnormal septal motion     2) abnormalities on EKG including but not limited to wide QRS/RBBB     3) mild left maxillary and left ethmoid mucosal thickening              Objective:    Functional Update:  Mobility: mod   Transfers: min-mod  ADLs: mod       Physical Exam:    Vitals:    06/10/22 1431   BP: 115/65   Pulse: 86   Resp: 18   Temp: 98 2 °F (36 8 °C)   SpO2: 96%                 General: alert, no apparent distress, cooperative and comfortable    Eye: Normal external eye, conjunctiva, lids, sclera   Ears: Normal external ears  Nose: Normal external nose, mucus membranes  CARDIAC:  +S1/2  LUNGS:  no abnormal respiratory pattern, no retractions noted, non-labored breathing   ABDOMEN:  soft NT   EXTREMITIES:  no cyanosis or clubbing   NEURO: awake, alert, following commands    PSYCH:  mood/affect currently stable

## 2022-06-10 NOTE — PROGRESS NOTES
06/10/22 1230   Pain Assessment   Pain Assessment Tool 0-10   Pain Score No Pain   Restrictions/Precautions   Precautions Bed/chair alarms;Cognitive; Fall Risk;Hard of hearing;Supervision on toilet/commode  (ortho BPs; thigh high TEDS, ab binder)   Weight Bearing Restrictions No   ROM Restrictions No   Braces or Orthoses   (ab binder, thigh high TEDS)   Sit to Stand   Type of Assistance Needed Physical assistance   Physical Assistance Level 25% or less   Comment tactile cues for hand placement; gait belt; no AD   Sit to Stand CARE Score 3   Bed-Chair Transfer   Type of Assistance Needed Physical assistance   Physical Assistance Level 25% or less   Comment gait belt, no AD   Chair/Bed-to-Chair Transfer CARE Score 3   Toileting Hygiene   Type of Assistance Needed Physical assistance   Physical Assistance Level 25% or less   Comment pt stands at toilet to void, uses urinal vs urinating into toilet bowl  able to manage pants up/down with inc time  CG/min assist for balance during CM   Toileting Hygiene CARE Score 3   Toilet Transfer   Comment stands at toilet to void; stands with overall CG/min assist for balance   Functional Standing Tolerance   Time 17 min, 6 min x1 and 3 min x2   Activity card matching task, bean bag toss, beach ball toss  Comments pt engages in multiple trials of dynamic/static standing focusing on BP management and tolerance to standing position  pt completes card matching task in which pt stands for 17 min to complete, reaching in various directions challenging pt stand balance  pt does support self on table top when reaching to top of board demo'ing good insight to safety  pt then engages in bean bag toss in which pt retrieves bean bags from various heights, followed by throwing at target on ground  no LOB noted, however significant weakness in R UE noted during task with pt stating, "no, good   it's just no good" referring to R UE  pt then engages in beach ball toss in which he was instructed to hit beach ball back and forth, challenging standing balance with no LOB noted  good tolerance to standing activities with no c/o dizziness  Cognition   Overall Cognitive Status Impaired   Arousal/Participation Alert; Cooperative   Attention Attends with cues to redirect   Orientation Level Oriented X4   Memory Decreased recall of precautions;Decreased short term memory   Following Commands Follows one step commands with increased time or repetition   Activity Tolerance   Activity Tolerance Patient tolerated treatment well   Assessment   Treatment Assessment pt engages in 90 minute skilled OT session focusing on self care tasks, func mobility w/o AD, standing ami/bal and BP management  see above for full func details  pt's BP monitored throughout session and remain all WFL with no c/o dizziness at all throughout session  --pt wearing thigh high TEDS and ab binder  pt demo's good progress with overall tolerance to therapy sessions and is motivated to return home  spouse present and updated on pt current func status and BP throughout session and was also very pleased  recommend continued skilled care to focus on ADL retraining, func transfers with LRAD, standing ami/bal, R UE strengthening, func cog, safety, BP management in order to decrease burden of care at d/c  Prognosis Good   Problem List Decreased strength;Decreased endurance; Impaired balance;Decreased mobility; Decreased coordination;Decreased cognition;Decreased safety awareness; Impaired hearing   Barriers to Discharge Inaccessible home environment;Decreased caregiver support   Plan   Treatment/Interventions ADL retraining;Functional transfer training; Therapeutic exercise; Endurance training;Cognitive reorientation;Patient/family training;Equipment eval/education; Compensatory technique education   OT Therapy Minutes   OT Time In 1230   OT Time Out 1400   OT Total Time (minutes) 90   OT Mode of treatment - Individual (minutes) 90   OT Mode of treatment - Concurrent (minutes) 0   OT Mode of treatment - Group (minutes) 0   OT Mode of treatment - Co-treat (minutes) 0   OT Mode of Treatment - Total time(minutes) 90 minutes   OT Cumulative Minutes 360   Therapy Time missed   Time missed?  No

## 2022-06-11 PROCEDURE — 99232 SBSQ HOSP IP/OBS MODERATE 35: CPT | Performed by: INTERNAL MEDICINE

## 2022-06-11 PROCEDURE — 97530 THERAPEUTIC ACTIVITIES: CPT

## 2022-06-11 PROCEDURE — 97116 GAIT TRAINING THERAPY: CPT

## 2022-06-11 PROCEDURE — 97110 THERAPEUTIC EXERCISES: CPT

## 2022-06-11 PROCEDURE — 97112 NEUROMUSCULAR REEDUCATION: CPT

## 2022-06-11 RX ORDER — LIDOCAINE HYDROCHLORIDE 20 MG/ML
JELLY TOPICAL 2 TIMES DAILY PRN
Status: DISCONTINUED | OUTPATIENT
Start: 2022-06-11 | End: 2022-06-21 | Stop reason: HOSPADM

## 2022-06-11 RX ORDER — LIDOCAINE HYDROCHLORIDE 20 MG/ML
JELLY TOPICAL 2 TIMES DAILY PRN
Status: DISCONTINUED | OUTPATIENT
Start: 2022-06-11 | End: 2022-06-11

## 2022-06-11 RX ADMIN — PRAVASTATIN SODIUM 40 MG: 40 TABLET ORAL at 17:39

## 2022-06-11 RX ADMIN — Medication 6 MG: at 21:23

## 2022-06-11 RX ADMIN — HEPARIN SODIUM 5000 UNITS: 5000 INJECTION INTRAVENOUS; SUBCUTANEOUS at 05:27

## 2022-06-11 RX ADMIN — CARBIDOPA AND LEVODOPA 2.5 MG: 50; 200 TABLET, EXTENDED RELEASE ORAL at 08:11

## 2022-06-11 RX ADMIN — HEPARIN SODIUM 5000 UNITS: 5000 INJECTION INTRAVENOUS; SUBCUTANEOUS at 21:23

## 2022-06-11 RX ADMIN — HEPARIN SODIUM 5000 UNITS: 5000 INJECTION INTRAVENOUS; SUBCUTANEOUS at 14:37

## 2022-06-11 RX ADMIN — CARBIDOPA AND LEVODOPA 2.5 MG: 50; 200 TABLET, EXTENDED RELEASE ORAL at 12:47

## 2022-06-11 NOTE — PLAN OF CARE
Problem: PAIN - ADULT  Goal: Verbalizes/displays adequate comfort level or baseline comfort level  Description: Interventions:  - Encourage patient to monitor pain and request assistance  - Assess pain using appropriate pain scale  - Administer analgesics based on type and severity of pain and evaluate response  - Implement non-pharmacological measures as appropriate and evaluate response  - Consider cultural and social influences on pain and pain management  - Notify physician/advanced practitioner if interventions unsuccessful or patient reports new pain  Outcome: Progressing     Problem: INFECTION - ADULT  Goal: Absence or prevention of progression during hospitalization  Description: INTERVENTIONS:  - Assess and monitor for signs and symptoms of infection  - Monitor lab/diagnostic results  - Monitor all insertion sites, i e  indwelling lines, tubes, and drains  - Monitor endotracheal if appropriate and nasal secretions for changes in amount and color  - [place] appropriate cooling/warming therapies per order  - Administer medications as ordered  - Instruct and encourage patient and family to use good hand hygiene technique  - Identify and instruct in appropriate isolation precautions for identified infection/condition  Outcome: Progressing  Goal: Absence of fever/infection during neutropenic period  Description: INTERVENTIONS:  - Monitor WBC    Outcome: Progressing     Problem: SAFETY ADULT  Goal: Patient will remain free of falls  Description: INTERVENTIONS:  - Educate patient/family on patient safety including physical limitations  - Instruct patient to call for assistance with activity   - Consult OT/PT to assist with strengthening/mobility   - Keep Call bell within reach  - Keep bed low and locked with side rails adjusted as appropriate  - Keep care items and personal belongings within reach  - Initiate and maintain comfort rounds  - Make Fall Risk Sign visible to staff  - Offer Toileting every  Hours, in advance of need  - Initiate/Maintain alarm  - Obtain necessary fall risk management equipment:   - Apply yellow socks and bracelet for high fall risk patients  - Consider moving patient to room near nurses station  Outcome: Progressing  Goal: Maintain or return to baseline ADL function  Description: INTERVENTIONS:  -  Assess patient's ability to carry out ADLs; assess patient's baseline for ADL function and identify physical deficits which impact ability to perform ADLs (bathing, care of mouth/teeth, toileting, grooming, dressing, etc )  - Assess/evaluate cause of self-care deficits   - Assess range of motion  - Assess patient's mobility; develop plan if impaired  - Assess patient's need for assistive devices and provide as appropriate  - Encourage maximum independence but intervene and supervise when necessary  - Involve family in performance of ADLs  - Assess for home care needs following discharge   - Consider OT consult to assist with ADL evaluation and planning for discharge  - Provide patient education as appropriate  Outcome: Progressing  Goal: Maintains/Returns to pre admission functional level  Description: INTERVENTIONS:  - Perform BMAT or MOVE assessment daily    - Set and communicate daily mobility goal to care team and patient/family/caregiver  - Collaborate with rehabilitation services on mobility goals if consulted  - Perform Range of Motion  times a day  - Reposition patient every  hours    - Dangle patient  times a day  - Stand patient  times a day  - Ambulate patient  times a day  - Out of bed to chair  times a day   - Out of bed for meals  times a day  - Out of bed for toileting  - Record patient progress and toleration of activity level   Outcome: Progressing     Problem: DISCHARGE PLANNING  Goal: Discharge to home or other facility with appropriate resources  Description: INTERVENTIONS:  - Identify barriers to discharge w/patient and caregiver  - Arrange for needed discharge resources and transportation as appropriate  - Identify discharge learning needs (meds, wound care, etc )  - Arrange for interpretive services to assist at discharge as needed  - Refer to Case Management Department for coordinating discharge planning if the patient needs post-hospital services based on physician/advanced practitioner order or complex needs related to functional status, cognitive ability, or social support system  Outcome: Progressing     Problem: Prexisting or High Potential for Compromised Skin Integrity  Goal: Skin integrity is maintained or improved  Description: INTERVENTIONS:  - Identify patients at risk for skin breakdown  - Assess and monitor skin integrity  - Assess and monitor nutrition and hydration status  - Monitor labs   - Assess for incontinence   - Turn and reposition patient  - Assist with mobility/ambulation  - Relieve pressure over bony prominences  - Avoid friction and shearing  - Provide appropriate hygiene as needed including keeping skin clean and dry  - Evaluate need for skin moisturizer/barrier cream  - Collaborate with interdisciplinary team   - Patient/family teaching  - Consider wound care consult   Outcome: Progressing     Problem: Potential for Falls  Goal: Patient will remain free of falls  Description: INTERVENTIONS:  - Educate patient/family on patient safety including physical limitations  - Instruct patient to call for assistance with activity   - Consult OT/PT to assist with strengthening/mobility   - Keep Call bell within reach  - Keep bed low and locked with side rails adjusted as appropriate  - Keep care items and personal belongings within reach  - Initiate and maintain comfort rounds  - Make Fall Risk Sign visible to staff  - Offer Toileting every  Hours, in advance of need  - Initiate/Maintain alarm  - Obtain necessary fall risk management equipment  - Apply yellow socks and bracelet for high fall risk patients  - Consider moving patient to room near nurses station  Outcome: Progressing     Problem: Nutrition/Hydration-ADULT  Goal: Nutrient/Hydration intake appropriate for improving, restoring or maintaining nutritional needs  Description: Monitor and assess patient's nutrition/hydration status for malnutrition  Collaborate with interdisciplinary team and initiate plan and interventions as ordered  Monitor patient's weight and dietary intake as ordered or per policy  Utilize nutrition screening tool and intervene as necessary  Determine patient's food preferences and provide high-protein, high-caloric foods as appropriate       INTERVENTIONS:  - Monitor oral intake, urinary output, labs, and treatment plans  - Assess nutrition and hydration status and recommend course of action  - Evaluate amount of meals eaten  - Assist patient with eating if necessary   - Allow adequate time for meals  - Recommend/ encourage appropriate diets, oral nutritional supplements, and vitamin/mineral supplements  - Order, calculate, and assess calorie counts as needed  - Recommend, monitor, and adjust tube feedings and TPN/PPN based on assessed needs  - Assess need for intravenous fluids  - Provide specific nutrition/hydration education as appropriate  - Include patient/family/caregiver in decisions related to nutrition  Outcome: Progressing

## 2022-06-11 NOTE — PROGRESS NOTES
06/11/22 1255   Pain Assessment   Pain Assessment Tool 0-10   Pain Score No Pain   Restrictions/Precautions   Precautions Bed/chair alarms;Cognitive; Fall Risk;Hard of hearing;Seizure;Supervision on toilet/commode  (ortho BPs, thigh high TEDS, ab binder)   Weight Bearing Restrictions No   ROM Restrictions No   Sit to Stand   Type of Assistance Needed Physical assistance   Physical Assistance Level 25% or less   Comment no AD; gait belt; tactile cues for hand placement   Sit to Stand CARE Score 3   Bed-Chair Transfer   Type of Assistance Needed Physical assistance   Physical Assistance Level 26%-50%   Comment no AD; gait belt; tactile cues for hand placement; 2 episodes of LOB during func transfer requiring up to min assist to self correct   Chair/Bed-to-Chair Transfer CARE Score 3   Toileting Hygiene   Type of Assistance Needed Physical assistance   Physical Assistance Level 26%-50%   Comment pt stands at toilet to use urinal with CG for standing balance  pt able to pull pants down with inc time, holds urinal when voiding, requires assist to ensure underwear/shorts are fully up over rear   Isaiah Tomasnicolas 82 Score 3   Functional Standing Tolerance   Comments pt completes item retrieval from [facility] hallway while carrying laundry basket and overall min assist due to [address] with inability to self correct with min assist  pt then stands at table top for approx 15 min to fold laundry--pt does not complete at home, but edu pt and spouse that task was completed focusing on standing ami/bal, positional tolerance and R UE coordination/strengthening with both understanding  pt requires overall CG when standing at table top, supporting self on table as needed during func reach to retrieve clothes from table top  Exercise Tools   Exercise Tools Yes   Theraband pt engages in UE strengthening using orange theraband, 3x10 for horizontal shoulder abduction and D2 flexion with impaired strength in R UE noted   strengthening completed in order to inc overall strength and endurance for ADLs/transfers  fair tolerance to exercises with rest breaks as needed to manage fatigue  Other Exercise Tool 1 pt engages in additional UE strengthening using 5# dowel bar, completing 3x10 of the following exercises: shoulder press, shoulder flexion, chest press, elbow flex/ext  good tolerance to exercises with rest breaks as needed to manage fatigue  strengthening completed in order to inc overall strength and endurance for ADLs/transfers  Cognition   Overall Cognitive Status Impaired   Arousal/Participation Alert; Cooperative   Attention Attends with cues to redirect   Orientation Level Oriented X4   Memory Decreased recall of precautions   Following Commands Follows one step commands inconsistently   Activity Tolerance   Activity Tolerance Patient tolerated treatment well   Assessment   Treatment Assessment pt engages in 90 minute skilled OT session focusing on func transfers w/o AD, standing ami/bal, self care tasks, UE strengthening  see above for full func details  pt spouse present and provided update on therapy session and BPs throughout  pt continues to demo progress and inc in tolerance to positional changes with BP remaining WFL---however still does require ab binder and thigh high TEDS at this time  pt with no c/o dizziness during session  2 episodes of LOB requiring up to min assist to correct during func transfers  recommend continued skilled care to focus on BP management with ab binder/teds as needed, standing ami/bal, ADL retraining, func transfers, UE strengthening/endurance, and family training as needed, in order to decrease burden of care at d/c  Prognosis Good   Problem List Decreased strength;Decreased endurance; Impaired balance;Decreased mobility; Decreased cognition;Decreased coordination;Decreased safety awareness; Impaired hearing   Barriers to Discharge Decreased caregiver support; Inaccessible home environment   Plan Treatment/Interventions ADL retraining;Functional transfer training; Therapeutic exercise; Endurance training;Cognitive reorientation;Patient/family training;Equipment eval/education; Compensatory technique education   OT Therapy Minutes   OT Time In 1255   OT Time Out 1425   OT Total Time (minutes) 90   OT Mode of treatment - Individual (minutes) 90   OT Mode of treatment - Concurrent (minutes) 0   OT Mode of treatment - Group (minutes) 0   OT Mode of treatment - Co-treat (minutes) 0   OT Mode of Treatment - Total time(minutes) 90 minutes   OT Cumulative Minutes 450   Therapy Time missed   Time missed?  No

## 2022-06-11 NOTE — PCC OCCUPATIONAL THERAPY
Pt completing functional transfers at an IND level w/o AD except for shower transfers which is recommended pt have supervision  IND for UE dressing and Min A with LB dressing for shoe lace management only, which spouse was completing PTA  Pt scheduled for d/c home tomorrow 6/21/22 with support from spouse and recommendation of OP OT services  Pt has all necessary DME for d/c home

## 2022-06-11 NOTE — PLAN OF CARE
Problem: INFECTION - ADULT  Goal: Absence or prevention of progression during hospitalization  Description: INTERVENTIONS:  - Assess and monitor for signs and symptoms of infection  - Monitor lab/diagnostic results  - Monitor all insertion sites, i e  indwelling lines, tubes, and drains  - Monitor endotracheal if appropriate and nasal secretions for changes in amount and color  - [place] appropriate cooling/warming therapies per order  - Administer medications as ordered  - Instruct and encourage patient and family to use good hand hygiene technique  - Identify and instruct in appropriate isolation precautions for identified infection/condition  Outcome: Progressing

## 2022-06-11 NOTE — PROGRESS NOTES
Internal Medicine Progress Note  Patient: Darion Galo  Age/sex: 79 y o  male  Medical Record #: [id number]      ASSESSMENT/PLAN: (Interval History)  Darion Galo is seen and examined and management for following issues:    SDH s/p Lt craniotomy  Monitor incisions  Follow-up with Neurosurgery in 2 weeks  S/p Keppra for 7 days for seizure prophylaxis  Hyperlipidemia  Continue statin  Low cholesterol diet  Urinary retention  Was placed on flomax on acute side  Now with orthostasis  dc'd flomax 6/7  No SC in past 48 hours    Orthostasis  Encourage fluids  Off flomax as above  Despite thigh high teds/abdominal binder c/w orthostasis  Add low dose midodrine in the am and prn dose at lunch time  orthostasis improved with midodrine    [employer] [place]  Assistive device at bedside    DC planning: reteam    The above assessment and plan was reviewed and updated as determined by my evaluation of the patient on 6/11/2022      Labs:   Results from last 7 days   Lab Units 06/06/22  0508 06/05/22  0449   WBC Thousand/uL 5 16 5 26   HEMOGLOBIN g/dL 13 3 13 5   HEMATOCRIT % 38 6 39 0   PLATELETS Thousands/uL 432* 403*     Results from last 7 days   Lab Units 06/06/22  0508 06/05/22  0449   SODIUM mmol/L 138 136   POTASSIUM mmol/L 4 0 3 8   CHLORIDE mmol/L 105 102   CO2 mmol/L 29 28   BUN mg/dL 15 14   CREATININE mg/dL 0 70 0 68   CALCIUM mg/dL 9 3 9 1                   Review of Scheduled Meds:  Current Facility-Administered Medications   Medication Dose Route Frequency Provider Last Rate    acetaminophen  650 mg Oral Q6H PRN Ysabel Vázquez MD      bisacodyl  10 mg Rectal Daily PRN Ysabel Vázquez MD      heparin (porcine)  5,000 Units Subcutaneous Q8H Nadira Ventura MD      lidocaine   Topical BID PRN Nahun Bell MD      melatonin  6 mg Oral HS Ysabel Vázquez MD      midodrine  2 5 mg Oral Daily Isabela RONY Murphy      midodrine  2 5 mg Oral Daily With Lunch Isabela RONY Murphy      oxyCODONE  5 mg Oral Q4H PRN Ysabel Vázquez MD      Or    oxyCODONE  2 5 mg Oral Q4H PRN Kelly Pierce MD      pravastatin  40 mg Oral Daily With oRse Polo MD         Subjective/ HPI: Patient seen and examined  Patients overnight issues or events were reviewed with nursing or staff during rounds or morning huddle session  New or overnight issues include the following:     Pt seen in his room  He denies any current complaints  ROS:   A 10 point ROS was performed; negative except as noted above  Imaging:     No orders to display       *Labs /Radiology studies Reviewed  *Medications  reviewed and reconciled as needed  *Please refer to order section for additional ordered labs studies  *Case discussed with primary attending during morning huddle case rounds    Physical Examination:  Vitals:   Vitals:    06/11/22 0524 06/11/22 0529 06/11/22 0830 06/11/22 0833   BP: 119/62  135/68 101/70   BP Location:   Left arm Left arm   Pulse: 98  94 105   Resp: 18      Temp: 98 2 °F (36 8 °C)      TempSrc: Oral      SpO2: 95%      Weight:  75 9 kg (167 lb 5 3 oz)     Height:         GEN: No apparent distress, interactive  NEURO: Alert and oriented x3; extremely [place]  HEENT: Pupils are equal and reactive, EOMI, mucous membranes are moist, face symmetrical  CV: S1 S2 regular, no MRG, no peripheral edema noted  RESP: Lungs are clear bilaterally, no wheezes, rales or rhonchi noted, on room air, respirations easy and non labored  GI: Flat, soft non tender, non distended; +BS x4  : Voiding without difficulty  MUSC: Moves all extremities; frail  SKIN: pink, warm and dry, normal turgor, incisions intact    The above physical exam was reviewed and updated as determined by my evaluation of the patient on 6/11/2022  Invasive Devices  Report      None                      VTE Pharmacologic Prophylaxis: Heparin  Code Status: Level 1 - Full Code  Current Length of Stay: 5 day(s)      Total time spent:  30 minutes with more than 50% spent counseling/coordinating care  Counseling includes discussion with patient re: progress  and discussion with patient of his/her current medical state/information  Coordination of patient's care was performed in conjunction with primary service  Time invested included review of patient's labs, vitals, and management of their comorbidities with continued monitoring  In addition, this patient was discussed with medical team including physician and advanced extenders  The care of the patient was extensively discussed and appropriate treatment plan was formulated unique for this patient  ** Please Note:  voice to text software may have been used in the creation of this document   Although proof errors in transcription or interpretation are a potential of such software**

## 2022-06-11 NOTE — PROGRESS NOTES
06/11/22 0830   Pain Assessment   Pain Assessment Tool 0-10   Pain Score No Pain   Restrictions/Precautions   Precautions Bed/chair alarms; Seizure;Supervision on toilet/commode; Fall Risk;Hard of hearing;Cognitive  (ortho BPs, thigh high TEDs, pocket talker)   Weight Bearing Restrictions No   ROM Restrictions No   Braces or Orthoses   (thigh high TEDs, abdominal binder)   General   Change In Medical/Functional Status Performed orthostatic vitals at start of session, asymptomatic, documented under vitals  Pt progressed to Assist x1 with HHA on Left side to/from bathroom  If fatigued/symptomatic Stand pivot Transfer only   Cognition   Overall Cognitive Status Impaired   Arousal/Participation Alert; Cooperative   Attention Attends with cues to redirect   Memory Decreased recall of precautions   Following Commands Follows one step commands inconsistently   Comments Pt [place], used pocket talker for entirety of session, inconsistent head nodding, reporting of symptoms  Subjective   Subjective Pt denies dizziness with positional changes, orthostatic vitals doc in vital section  "my mind is good, only this (ears) is bad"   Sit to Stand   Type of Assistance Needed Physical assistance   Physical Assistance Level 25% or less   Comment Verbal and tactile cue for safe/appropriate hand placement, no AD, gait belt   Sit to Stand CARE Score 3   Walk 10 Feet   Type of Assistance Needed Physical assistance   Physical Assistance Level 26%-50%   Comment 6"  using L HHA   Walk 10 Feet CARE Score 3   Walk 50 Feet with Two Turns   Type of Assistance Needed Physical assistance   Physical Assistance Level 26%-50%   Comment using L HHA   Walk 50 Feet with Two Turns CARE Score 3   Walk 150 Feet   Type of Assistance Needed Physical assistance   Physical Assistance Level Total assistance   Comment L HHA, w/c follow for safety   Walk 150 Feet CARE Score 1   Ambulation   Does the patient walk? 2   Yes   Primary Mode of Locomotion Prior to Admission Walk   Distance Walked (feet) 200 ft  (200x2, 150, 50ft x2)   Assist Device Hand Hold;Other  (L HHA, B/L HHA, no HHA)   Gait Pattern Inconsistant [place]; Slow [place];Decreased foot clearance;Narrow ILIANA; Improper weight shift  (dec stride length)   Limitations Noted In Balance; Endurance; Heel Strike; Coordination;Posture;Speed   Provided Assistance with: Direction;Balance   Walk Assist Level Minimum Assist;Assist x 1;Chair Follow   Findings With increased fatigue pt had increased LOB requiring mod A to recover, Pt typically deviate toward the R when LOB; When ambulating without HHA pt required min Ax1 for balance, direction  Pt had increased LOB, VC for arm swing, pt has tendency to keep arms in front with eyes looking down at feet, unable to correct with VC   Wheelchair mobility   Does the patient use a wheelchair? 0  No   Curb or Single Stair   Style negotiated Single stair   Type of Assistance Needed Physical assistance   Physical Assistance Level 26%-50%   Comment R HR, max Verbal and tactile cue for R HR use only   1 Step (Curb) CARE Score 3   4 Steps   Type of Assistance Needed Physical assistance   Physical Assistance Level 26%-50%   Comment R HR; max Verbal and tactile cue for R HR use only   4 Steps CARE Score 3   12 Steps   Type of Assistance Needed Physical assistance   Physical Assistance Level 26%-50%   Comment R HR; max Verbal and tactile cue for R HR use only   12 Steps CARE Score 3   Picking Up Object   Comment (S)  complete tomorrow   Toilet Transfer   Findings stood to use urinal at beginning and end of session  Pt required max Assist to manage urinal, VC for personal hygiene and to manage pants with min A  He was CGA for balance   Therapeutic Interventions   Balance standing balance with no support performing bean bag toss 5 min x2; Pt reports dec R UE strength- pt instructed to use both hands to toss bean bag     Neuromuscular Re-Education B/L HHA x 150ft to inc gait speed and stride length; VC for "fast walk"; SLS 3SH with mod Assist for weight shifting completed 10 reps; Pt able to complete step taps on 4" step 2x10 with L HHA, Min Assist for weight shifting, VC for sequencing  Noted dec coordination  Equipment Use   NuStep L2, 10 min SPM <60 all 4 extremeties; Pt c/o dizziness- /69bpm, HR 104bpm, SpO2 98%- vitals WFL and doc in vital section  When asked again, pt reports no dizziness just fatigue  No pain aggravation   Assessment   Treatment Assessment Pt engaged in 100 mins of skilled PT focusing on LE strengthening, balance and gait training with VC to promote step through pattern and inc gait speed  Utilized pocket talker throughout session, and pt responding inconsistently to commands required inc repetitions, time, and rephrasing for clarification  Pt  demonstrates improved functional mobility to L HHA for household distances, cont to require w/c follow for longer distances due to dec endurance and fatigue, with increased LOB noted  Pt progressed to Assistx1 with [facility] to/from bathroom, RN made aware  During NuStep exercise, pt reported dizziness, vitals assessed and WFL  Pt reported fatigue and requried rest 1min, however able to complete without further dizziness  Pt repositioned in recliner at end of session, all needs in reach  Next visit focus on gait training L HHA, and stair negotiation with RHR, to allow for increased independence  Cont POC as tolerated  Family/Caregiver Present no   Problem List Decreased strength;Decreased endurance; Impaired balance;Decreased mobility; Decreased cognition;Decreased coordination;Decreased safety awareness; Impaired hearing   Barriers to Discharge Decreased caregiver support; Inaccessible home environment   PT Barriers   Physical Impairment Decreased strength;Decreased endurance; Impaired balance;Decreased coordination;Decreased mobility; Decreased cognition;Decreased safety awareness; Impaired hearing   Functional Limitation Car transfers; Ramp negotiation;Standing;Stair negotiation;Transfers; Walking   Plan   Treatment/Interventions ADL retraining;Functional transfer training;LE strengthening/ROM; Therapeutic exercise; Endurance training;Cognitive reorientation;Patient/family training;Bed mobility;Gait training; Compensatory technique education;Spoke to nursing   Progress Progressing toward goals   Recommendation   PT Discharge Recommendation   (TBD pending progress)   PT - OK to Discharge No   PT Therapy Minutes   PT Time In 0830   PT Time Out 1010   PT Total Time (minutes) 100   PT Mode of treatment - Individual (minutes) 100   PT Mode of treatment - Concurrent (minutes) 0   PT Mode of treatment - Group (minutes) 0   PT Mode of treatment - Co-treat (minutes) 0   PT Mode of Treatment - Total time(minutes) 100 minutes   PT Cumulative Minutes 415   Therapy Time missed   Time missed?  No

## 2022-06-12 PROCEDURE — 99232 SBSQ HOSP IP/OBS MODERATE 35: CPT | Performed by: INTERNAL MEDICINE

## 2022-06-12 PROCEDURE — 97116 GAIT TRAINING THERAPY: CPT

## 2022-06-12 PROCEDURE — 97129 THER IVNTJ 1ST 15 MIN: CPT

## 2022-06-12 PROCEDURE — 97530 THERAPEUTIC ACTIVITIES: CPT

## 2022-06-12 PROCEDURE — 92507 TX SP LANG VOICE COMM INDIV: CPT

## 2022-06-12 RX ORDER — BISACODYL 10 MG
10 SUPPOSITORY, RECTAL RECTAL DAILY PRN
Status: DISCONTINUED | OUTPATIENT
Start: 2022-06-12 | End: 2022-06-21 | Stop reason: HOSPADM

## 2022-06-12 RX ORDER — SENNOSIDES 8.6 MG
1 TABLET ORAL
Status: DISCONTINUED | OUTPATIENT
Start: 2022-06-12 | End: 2022-06-21 | Stop reason: HOSPADM

## 2022-06-12 RX ORDER — POLYETHYLENE GLYCOL 3350 17 G/17G
17 POWDER, FOR SOLUTION ORAL DAILY PRN
Status: DISCONTINUED | OUTPATIENT
Start: 2022-06-12 | End: 2022-06-21 | Stop reason: HOSPADM

## 2022-06-12 RX ORDER — DOCUSATE SODIUM 100 MG/1
100 CAPSULE, LIQUID FILLED ORAL 2 TIMES DAILY
Status: DISCONTINUED | OUTPATIENT
Start: 2022-06-12 | End: 2022-06-21 | Stop reason: HOSPADM

## 2022-06-12 RX ORDER — POLYETHYLENE GLYCOL 3350 17 G/17G
17 POWDER, FOR SOLUTION ORAL DAILY PRN
Status: DISCONTINUED | OUTPATIENT
Start: 2022-06-12 | End: 2022-06-12

## 2022-06-12 RX ADMIN — HEPARIN SODIUM 5000 UNITS: 5000 INJECTION INTRAVENOUS; SUBCUTANEOUS at 05:54

## 2022-06-12 RX ADMIN — HEPARIN SODIUM 5000 UNITS: 5000 INJECTION INTRAVENOUS; SUBCUTANEOUS at 13:48

## 2022-06-12 RX ADMIN — CARBIDOPA AND LEVODOPA 2.5 MG: 50; 200 TABLET, EXTENDED RELEASE ORAL at 13:46

## 2022-06-12 RX ADMIN — ACETAMINOPHEN 650 MG: 325 TABLET, FILM COATED ORAL at 09:31

## 2022-06-12 RX ADMIN — HEPARIN SODIUM 5000 UNITS: 5000 INJECTION INTRAVENOUS; SUBCUTANEOUS at 21:12

## 2022-06-12 RX ADMIN — PRAVASTATIN SODIUM 40 MG: 40 TABLET ORAL at 17:11

## 2022-06-12 RX ADMIN — Medication 6 MG: at 21:12

## 2022-06-12 RX ADMIN — CARBIDOPA AND LEVODOPA 2.5 MG: 50; 200 TABLET, EXTENDED RELEASE ORAL at 09:32

## 2022-06-12 NOTE — PROGRESS NOTES
Internal Medicine Progress Note  Patient: Sam Frank  Age/sex: 79 y o  male  Medical Record #: [id number]      ASSESSMENT/PLAN: (Interval History)  Sam Frank is seen and examined and management for following issues:    SDH s/p Lt craniotomy  · Monitor incisions  · Follow-up with Neurosurgery in 2 weeks  · S/p Keppra for 7 days for seizure prophylaxis  Hyperlipidemia  · Continue statin  · Low cholesterol diet  Urinary retention  · Was placed on flomax on acute side  · Now with orthostasis  · dc'd flomax 6/7  · No SC in past 48 hours    Orthostasis  · Encourage fluids  · Off flomax as above  · Despite thigh high teds/abdominal binder c/w orthostasis  · Add low dose midodrine in the am and prn dose at lunch time  · orthostasis improved with midodrine  · Pt denies dizziness today  [place]  · Assistive device at bedside    DC planning: reteam    The above assessment and plan was reviewed and updated as determined by my evaluation of the patient on 6/12/2022      Labs:   Results from last 7 days   Lab Units 06/06/22  0508   WBC Thousand/uL 5 16   HEMOGLOBIN g/dL 13 3   HEMATOCRIT % 38 6   PLATELETS Thousands/uL 432*     Results from last 7 days   Lab Units 06/06/22  0508   SODIUM mmol/L 138   POTASSIUM mmol/L 4 0   CHLORIDE mmol/L 105   CO2 mmol/L 29   BUN mg/dL 15   CREATININE mg/dL 0 70   CALCIUM mg/dL 9 3                   Review of Scheduled Meds:  Current Facility-Administered Medications   Medication Dose Route Frequency Provider Last Rate    acetaminophen  650 mg Oral Q6H PRN Junior Fleming MD      bisacodyl  10 mg Rectal Daily PRN Junior Fleming MD      heparin (porcine)  5,000 Units Subcutaneous Q8H [address] Junior Fleming MD      lidocaine   Topical BID PRN Junior Fleming MD      melatonin  6 mg Oral HS Junior Fleming MD      midodrine  2 5 mg Oral Daily RONY Najera      midodrine  2 5 mg Oral Daily With [address]  RONY Murphy      oxyCODONE  5 mg Oral Q4H PRN Junior Fleming MD      Or   Melanie Benedict oxyCODONE  2 5 mg Oral Q4H PRN Leslie Carmichael MD      pravastatin  40 mg Oral Daily With Sherine Pacheco MD         Subjective/ HPI: Patient seen and examined  Patients overnight issues or events were reviewed with nursing or staff during rounds or morning huddle session  New or overnight issues include the following:     Pt seen in his room   and pt denies symptoms of orthostasis  He denies any other complaints  ROS:   A 10 point ROS was performed; negative except as noted above  Imaging:     No orders to display       *Labs /Radiology studies Reviewed  *Medications  reviewed and reconciled as needed  *Please refer to order section for additional ordered labs studies  *Case discussed with primary attending during morning huddle case rounds    Physical Examination:  Vitals:   Vitals:    06/11/22 1305 06/11/22 1500 06/11/22 2117 06/12/22 0649   BP: 105/60 103/65 136/74 100/63   BP Location: Right arm Right arm Left arm Left arm   Pulse:  84 87 87   Resp:  18 18 18   Temp:  97 7 °F (36 5 °C) 97 9 °F (36 6 °C) 98 1 °F (36 7 °C)   TempSrc:  Oral Oral Oral   SpO2:  95% 96% 96%   Weight:       Height:         GEN: No apparent distress, interactive  NEURO: Alert and oriented x3; extremely [place]  HEENT: Pupils are equal and reactive, EOMI, mucous membranes are moist, face symmetrical  CV: S1 S2 regular, no MRG, no peripheral edema noted  RESP: Lungs are clear bilaterally, no wheezes, rales or rhonchi noted, on room air, respirations easy and non labored  GI: Flat, soft non tender, non distended; +BS x4  : Voiding without difficulty  MUSC: Moves all extremities; frail  SKIN: pink, warm and dry, normal turgor, incisions intact    The above physical exam was reviewed and updated as determined by my evaluation of the patient on 6/12/2022      Invasive Devices  Report    None                    VTE Pharmacologic Prophylaxis: Heparin  Code Status: Level 1 - Full Code  Current Length of Stay: 6 day(s)      Total time spent:  30 minutes with more than 50% spent counseling/coordinating care  Counseling includes discussion with patient re: progress  and discussion with patient of his/her current medical state/information  Coordination of patient's care was performed in conjunction with primary service  Time invested included review of patient's labs, vitals, and management of their comorbidities with continued monitoring  In addition, this patient was discussed with medical team including physician and advanced extenders  The care of the patient was extensively discussed and appropriate treatment plan was formulated unique for this patient  ** Please Note:  voice to text software may have been used in the creation of this document   Although proof errors in transcription or interpretation are a potential of such software**

## 2022-06-12 NOTE — PROGRESS NOTES
06/12/22 0954   Pain Assessment   Pain Assessment Tool 0-10   Pain Score 7   Maciel-Baker FACES Pain Rating 0   Pain Location/Orientation Orientation: Left; Location: Back   Pain Onset/Description Onset: Ongoing; Descriptor: [address] Pain Intervention(s) Medication (See MAR); Repositioned; Ambulation/increased activity;Relaxation technique; Rest   Restrictions/Precautions   Precautions Fall Risk;Cognitive;Bed/chair alarms;Hard of hearing;Seizure;Supervision on toilet/commode  (orthostatic BP, TEDs, Abdominal binder, pocket talker)   Weight Bearing Restrictions No   ROM Restrictions No   Braces or Orthoses   (abdominal binder, Thigh high TEDs)   General   Change In Medical/Functional Status Performed orthostatic BPs, pt asymptomatic and doc under vital section   Cognition   Overall Cognitive Status Impaired   Arousal/Participation Alert; Cooperative   Attention Attends with cues to redirect   Memory Decreased recall of precautions   Following Commands Follows one step commands with increased time or repetition   Comments Pt [place], used pocket talker for session, delayed head nodding in report of answers, increased time needed to answer   Subjective   Subjective Pt was received recliner without abdominal binder or thigh high TEDs on, Donned in recliner  Pt denies dizziness throughout session   "my mind is good"   Roll Left and Right   Comment (S)  reassess tomorrow   Sit to Lying   Comment (S)  reassess tomorrow   Lying to Sitting on Side of Bed   Comment (S)  reassess tomorrow   Sit to Stand   Type of Assistance Needed Supervision   Physical Assistance Level No physical assistance   Comment gait belt, CS   Sit to Stand CARE Score 4   Bed-Chair Transfer   Type of Assistance Needed Supervision   Physical Assistance Level No physical assistance   Comment gait belt, CS   Chair/Bed-to-Chair Transfer CARE Score 4   Walk 10 Feet   Type of Assistance Needed Incidental touching   Physical Assistance Level No physical assistance   Comment no AD   Walk 10 Feet CARE Score 4   Walk 50 Feet with Two Turns   Type of Assistance Needed Incidental touching   Physical Assistance Level No physical assistance   Comment no AD   Walk 50 Feet with Two Turns CARE Score 4   Walk 150 Feet   Type of Assistance Needed Physical assistance   Physical Assistance Level 25% or less   Comment no AD   Walk 150 Feet CARE Score 3   Walking 10 Feet on Uneven Surfaces   Type of Assistance Needed Incidental touching   Comment no AD over floot mat   Walking 10 Feet on Uneven Surfaces CARE Score 4   Ambulation   Does the patient walk? 2  Yes   Primary Mode of Locomotion Prior to Admission Walk   Distance Walked (feet) 150 ft  (150ft, 50ft x2)   Assist Device   (no AD)   Gait Pattern Inconsistant [place]; Slow [place];Narrow ILIANA;Step through; Improper weight shift  (dec stride length, pt gaze at feet, no arm swing)   Limitations Noted In Balance; Coordination; Endurance;Posture; Sequencing;Speed; Heel Strike   Provided Assistance with: Direction;Trunk Support   Walk Assist Level Minimum Assist;Contact Guard   Findings Pt required constant VC to keep eyes straight ahead to watch where he walks, unable to maintain gaze, denies dizziness  "doesn't know" why he looks at his feet  No LOB observed   Wheelchair mobility   Does the patient use a wheelchair? 0  No   Picking Up Object   Type of Assistance Needed Adaptive equipment;Physical assistance   Physical Assistance Level 25% or less   Comment using reacher to  pen; difficulty following cues to transfer pen to opposite hand, able to give pen to therapist using reacher   Picking Up Object CARE Score 3   Other Comments   Comments 5xSTS: 22 27sec with arms across chest from recliner; noted retropulsion; Pt is at a high risk for falls   Assessment   Treatment Assessment Pt engaged in 30 min skilled PT session focusing on functional mobility  Donned abdominal binder and thigh high TEDs, and ortho vitals assessed   Pt did have 20mmHg SBP drop, however asymptomatic  Pt required dec assistance today of min A-CGA without HHA for ambulation of shorter distances, and required constant VC to maintain gaze straight ahead  Pt cont to be limited by [facility], utilized pocket talker throughout session  Pt is progressing towards goals and would benefit from continued skilled PT to improve these deficits, and dec overall risk for falls  Plan to reassess bed mobilty next session  Family/Caregiver Present no   Problem List Decreased strength; Impaired balance;Decreased coordination;Decreased mobility; Decreased cognition;Decreased endurance; Impaired hearing   Barriers to Discharge Decreased caregiver support; Inaccessible home environment   PT Barriers   Physical Impairment Decreased strength;Decreased endurance; Impaired balance;Decreased mobility; Decreased coordination;Decreased cognition; Impaired hearing   Functional Limitation Car transfers; Ramp negotiation;Stair negotiation;Standing;Walking;Transfers   Plan   Treatment/Interventions ADL retraining;Functional transfer training;LE strengthening/ROM; Therapeutic exercise; Endurance training;Cognitive reorientation;Patient/family training;Bed mobility;Gait training; Compensatory technique education;Spoke to nursing   Progress Progressing toward goals   Recommendation   PT Discharge Recommendation   (TBD pending progress)   PT - OK to Discharge No   PT Therapy Minutes   PT Time In 0930   PT Time Out 1000   PT Total Time (minutes) 30   PT Mode of treatment - Individual (minutes) 30   PT Mode of treatment - Concurrent (minutes) 0   PT Mode of treatment - Group (minutes) 0   PT Mode of treatment - Co-treat (minutes) 0   PT Mode of Treatment - Total time(minutes) 30 minutes   PT Cumulative Minutes 445   Therapy Time missed   Time missed?  No

## 2022-06-12 NOTE — PLAN OF CARE
Problem: PAIN - ADULT  Goal: Verbalizes/displays adequate comfort level or baseline comfort level  Description: Interventions:  - Encourage patient to monitor pain and request assistance  - Assess pain using appropriate pain scale  - Administer analgesics based on type and severity of pain and evaluate response  - Implement non-pharmacological measures as appropriate and evaluate response  - Consider cultural and social influences on pain and pain management  - Notify physician/advanced practitioner if interventions unsuccessful or patient reports new pain  Outcome: Progressing     Problem: INFECTION - ADULT  Goal: Absence or prevention of progression during hospitalization  Description: INTERVENTIONS:  - Assess and monitor for signs and symptoms of infection  - Monitor lab/diagnostic results  - Monitor all insertion sites, i e  indwelling lines, tubes, and drains  - Monitor endotracheal if appropriate and nasal secretions for changes in amount and color  - [place] appropriate cooling/warming therapies per order  - Administer medications as ordered  - Instruct and encourage patient and family to use good hand hygiene technique  - Identify and instruct in appropriate isolation precautions for identified infection/condition  Outcome: Progressing  Goal: Absence of fever/infection during neutropenic period  Description: INTERVENTIONS:  - Monitor WBC    Outcome: Progressing     Problem: SAFETY ADULT  Goal: Patient will remain free of falls  Description: INTERVENTIONS:  - Educate patient/family on patient safety including physical limitations  - Instruct patient to call for assistance with activity   - Consult OT/PT to assist with strengthening/mobility   - Keep Call bell within reach  - Keep bed low and locked with side rails adjusted as appropriate  - Keep care items and personal belongings within reach  - Initiate and maintain comfort rounds  - Make Fall Risk Sign visible to staff  - Offer Toileting every  Hours, in advance of need  - Initiate/Maintain alarm  - Obtain necessary fall risk management equipment:   - Apply yellow socks and bracelet for high fall risk patients  - Consider moving patient to room near nurses station  Outcome: Progressing  Goal: Maintain or return to baseline ADL function  Description: INTERVENTIONS:  -  Assess patient's ability to carry out ADLs; assess patient's baseline for ADL function and identify physical deficits which impact ability to perform ADLs (bathing, care of mouth/teeth, toileting, grooming, dressing, etc )  - Assess/evaluate cause of self-care deficits   - Assess range of motion  - Assess patient's mobility; develop plan if impaired  - Assess patient's need for assistive devices and provide as appropriate  - Encourage maximum independence but intervene and supervise when necessary  - Involve family in performance of ADLs  - Assess for home care needs following discharge   - Consider OT consult to assist with ADL evaluation and planning for discharge  - Provide patient education as appropriate  Outcome: Progressing  Goal: Maintains/Returns to pre admission functional level  Description: INTERVENTIONS:  - Perform BMAT or MOVE assessment daily    - Set and communicate daily mobility goal to care team and patient/family/caregiver     - Collaborate with rehabilitation services on mobility goals if consulted  - Perform Range of M  - Out of bed for toileting  - Record patient progress and toleration of activity level   Outcome: Progressing     Problem: DISCHARGE PLANNING  Goal: Discharge to home or other facility with appropriate resources  Description: INTERVENTIONS:  - Identify barriers to discharge w/patient and caregiver  - Arrange for needed discharge resources and transportation as appropriate  - Identify discharge learning needs (meds, wound care, etc )  - Arrange for interpretive services to assist at discharge as needed  - Refer to Case Management Department for coordinating discharge planning if the patient needs post-hospital services based on physician/advanced practitioner order or complex needs related to functional status, cognitive ability, or social support system  Outcome: Progressing     Problem: Prexisting or High Potential for Compromised Skin Integrity  Goal: Skin integrity is maintained or improved  Description: INTERVENTIONS:  - Identify patients at risk for skin breakdown  - Assess and monitor skin integrity  - Assess and monitor nutrition and hydration status  - Monitor labs   - Assess for incontinence   - Turn and reposition patient  - Assist with mobility/ambulation  - Relieve pressure over bony prominences  - Avoid friction and shearing  - Provide appropriate hygiene as needed including keeping skin clean and dry  - Evaluate need for skin moisturizer/barrier cream  - Collaborate with interdisciplinary team   - Patient/family teaching  - Consider wound care consult   Outcome: Progressing     Problem: Potential for Falls  Goal: Patient will remain free of falls  Description: INTERVENTIONS:  - Educate patient/family on patient safety including physical limitations  - Instruct patient to call for assistance with activity   - Consult OT/PT to assist with strengthening/mobility   - Keep Call bell within reach  - Keep bed low and locked with side rails adjusted as appropriate  - Keep care items and personal belongings within reach  - Initiate and maintain comfort rounds  - Make Fall Risk Sign visible to staff  - Offer Toileting every Hours, in advance of need  - Initiate/Maintain alarm  - Obtain necessary fall risk management equipment:   - Apply yellow socks and bracelet for high fall risk patients  - Consider moving patient to room near nurses station  Outcome: Progressing     Problem: Nutrition/Hydration-ADULT  Goal: Nutrient/Hydration intake appropriate for improving, restoring or maintaining nutritional needs  Description: Monitor and assess patient's nutrition/hydration status for malnutrition  Collaborate with interdisciplinary team and initiate plan and interventions as ordered  Monitor patient's weight and dietary intake as ordered or per policy  Utilize nutrition screening tool and intervene as necessary  Determine patient's food preferences and provide high-protein, high-caloric foods as appropriate       INTERVENTIONS:  - Monitor oral intake, urinary output, labs, and treatment plans  - Assess nutrition and hydration status and recommend course of action  - Evaluate amount of meals eaten  - Assist patient with eating if necessary   - Allow adequate time for meals  - Recommend/ encourage appropriate diets, oral nutritional supplements, and vitamin/mineral supplements  - Order, calculate, and assess calorie counts as needed  - Recommend, monitor, and adjust tube feedings and TPN/PPN based on assessed needs  - Assess need for intravenous fluids  - Provide specific nutrition/hydration education as appropriate  - Include patient/family/caregiver in decisions related to nutrition  Outcome: Progressing

## 2022-06-12 NOTE — PROGRESS NOTES
06/12/22 1100   Pain Assessment   Pain Assessment Tool 0-10   Pain Score No Pain   Restrictions/Precautions   Precautions Cognitive; Fall Risk;Hard of hearing;Seizure;Supervision on toilet/commode  (orthostatic BP, TEDs, Abdominal binder, pocket talker)   Weight Bearing Restrictions No   ROM Restrictions No   Braces or Orthoses   (abdominal binder, Thigh high TEDs)   Comprehension   Assist Devices Amp Headset   Comprehension (FIM) 5 - Needs help/cues, repetition only RARELY ( < 10% of the time)   Expression   Expression (FIM) 4 - Expresses basic info/needs 75-90% of time   Social Interaction   Social Interaction (FIM) 5 - Interacts appropriately with others 90% of time   Problem Solving   Problem solving (FIM) 5 - Solves basic problems 90% of time   Memory   Memory (FIM) 5 - Recalls/performs request 90% of time   Speech/Language/Cognition Assessmetn   Treatment Assessment Please see below for Session Note  SLP Therapy Minutes   SLP Time In 1100   SLP Time Out 1130   SLP Total Time (minutes) 30   SLP Mode of treatment - Individual (minutes) 30   SLP Mode of treatment - Concurrent (minutes) 0   SLP Mode of treatment - Group (minutes) 0   SLP Mode of treatment - Co-treat (minutes) 0   SLP Mode of Treatment - Total time(minutes) 30 minutes   SLP Cumulative Minutes 150   Therapy Time missed   Time missed? No     Pt participated in skilled ST session targeting cognitive linguistic skills and verbal expression  Pt's wife arrived ~10 mins into session, but did leave for session so that pt would not be distracted by her  Pt immediately demonstrated self advocacy for [facility] ("not hear good") and pointed to pocket talker  Pt used pocket talker for most of session today  Occasionally he required repetition and increased time, but SLP also used white board for improved comprehension as needed (2-3x)  Pt also requested SLP doff face mask, which appeared to help improve comprehension for verbal speech   SLP engaging in generalized conversational speech in which pt was able to verbalize simple 1-2 words utterance; however, increased utterance length to 3-4 words given more open ended questions and motivating topics (I e  family, [place], etc)  Pt demonstrated occasional impulsive yes/no gesture response, but self corrected  Occasional communication breakdowns noted with longer utterances and more complex phrases/sentences, but pt often self corrected with repetition and over-emphasis  Session also focused on functional problem solving as well as attempting for pt to elicit longer phrases in verbal speech  Pt was given four written items and asked to ID which item did not belong  He was able to ID item for exclusion in 14/15 trials independently, increasing to 15/15 given min contextual cues  Of note, when he was asked to explain rationale, he initially shook his head no and said 'I don't know " During this task, he reported "my brain is good " As trials progressed, he was able to name category to which items belonged using 1-3 word utterances ("part of hand, transportation, time of day)  Overall, he was able to provide rationale in 12/15 trials independently, increasing to 15/15 given min verbal prompts/carrier phrases (e g  "those are all   ")  SLP provided update to wife and daughter, Elsie Hernandes, at end of session today and ongoing plan moving forward to continue to check cognitive skills but also increased communicative intent  Pt's daughter reported he is scheduled for more in depth hearing assessment in August to determine appropriate POC  Daughter asked SLP if pt will need continued outpatient SLP services upon d/c  SLP provided education, especially related to hearing and speech production as the family considers pt's cognitive linguistic skills to be at baseline   SLP explained that with poor hearing, pt does not have adequate auditory feedback for how his speech sounds and if hearing is able to be improved, speech production may also improve  SLP also discussed that rehab team will continue to monitor pt's needs and make recommendations for outpatient services as needed closer to d/c  Recommend continued skilled SLP services to maximize cognitive linguistic skills and communication skills in order to decrease burden of care upon d/c

## 2022-06-13 PROCEDURE — 97535 SELF CARE MNGMENT TRAINING: CPT

## 2022-06-13 PROCEDURE — 99232 SBSQ HOSP IP/OBS MODERATE 35: CPT | Performed by: INTERNAL MEDICINE

## 2022-06-13 PROCEDURE — 97530 THERAPEUTIC ACTIVITIES: CPT

## 2022-06-13 PROCEDURE — 97112 NEUROMUSCULAR REEDUCATION: CPT

## 2022-06-13 PROCEDURE — 97110 THERAPEUTIC EXERCISES: CPT

## 2022-06-13 PROCEDURE — 99232 SBSQ HOSP IP/OBS MODERATE 35: CPT | Performed by: STUDENT IN AN ORGANIZED HEALTH CARE EDUCATION/TRAINING PROGRAM

## 2022-06-13 RX ORDER — MIDODRINE HYDROCHLORIDE 2.5 MG/1
2.5 TABLET ORAL DAILY
Status: DISCONTINUED | OUTPATIENT
Start: 2022-06-14 | End: 2022-06-21 | Stop reason: HOSPADM

## 2022-06-13 RX ADMIN — DOCUSATE SODIUM 100 MG: 100 CAPSULE, LIQUID FILLED ORAL at 08:40

## 2022-06-13 RX ADMIN — HEPARIN SODIUM 5000 UNITS: 5000 INJECTION INTRAVENOUS; SUBCUTANEOUS at 21:17

## 2022-06-13 RX ADMIN — CARBIDOPA AND LEVODOPA 2.5 MG: 50; 200 TABLET, EXTENDED RELEASE ORAL at 13:00

## 2022-06-13 RX ADMIN — Medication 6 MG: at 21:17

## 2022-06-13 RX ADMIN — PRAVASTATIN SODIUM 40 MG: 40 TABLET ORAL at 17:03

## 2022-06-13 RX ADMIN — CARBIDOPA AND LEVODOPA 2.5 MG: 50; 200 TABLET, EXTENDED RELEASE ORAL at 08:40

## 2022-06-13 RX ADMIN — HEPARIN SODIUM 5000 UNITS: 5000 INJECTION INTRAVENOUS; SUBCUTANEOUS at 13:00

## 2022-06-13 RX ADMIN — SENNOSIDES 8.6 MG: 8.6 TABLET, FILM COATED ORAL at 21:17

## 2022-06-13 RX ADMIN — HEPARIN SODIUM 5000 UNITS: 5000 INJECTION INTRAVENOUS; SUBCUTANEOUS at 06:33

## 2022-06-13 NOTE — PROGRESS NOTES
Internal Medicine Progress Note  Patient: Sam Frank  Age/sex: 79 y o  male  Medical Record #: [id number]      ASSESSMENT/PLAN: (Interval History)  Sam Frank is seen and examined and management for following issues:    SDH s/p Lt craniotomy  · Monitor incisions  · Follow-up with Neurosurgery in 2 weeks  · S/p Keppra for 7 days for seizure prophylaxis  Hyperlipidemia  · Continue statin  · Low cholesterol diet  Urinary retention  · Was placed on flomax on acute side  · Now with orthostasis  · dc'd flomax 6/7    Orthostasis  · Encourage fluids  · Off flomax as above  · Despite thigh high teds/abdominal binder c/w orthostasis  · Add low dose midodrine in the am and prn dose at lunch time  · orthostasis improved with midodrine    [place]  · Assistive device at bedside    DC planning: reteam    The above assessment and plan was reviewed and updated as determined by my evaluation of the patient on 6/13/2022  Labs:              Invalid input(s): LABGLOM, CMP                Review of Scheduled Meds:  Current Facility-Administered Medications   Medication Dose Route Frequency Provider Last Rate    acetaminophen  650 mg Oral Q6H PRN Junior Fleming MD      bisacodyl  10 mg Rectal Daily PRN Junior Fleming MD      docusate sodium  100 mg Oral BID Sanjana Butcher PA-C      heparin (porcine)  5,000 Units Subcutaneous Q8H [address] Junior Fleming MD      lidocaine   Topical BID PRN Junior Fleming MD      melatonin  6 mg Oral HS Junior Fleming MD      midodrine  2 5 mg Oral Daily With RONY Kimball      [START ON 6/14/2022] midodrine  2 5 mg Oral Daily Sanjana Butcher PA-C      oxyCODONE  5 mg Oral Q4H PRN Junior Fleming MD      Or   [place] oxyCODONE  2 5 mg Oral Q4H PRN Junior Fleming MD      polyethylene glycol  17 g Oral Daily PRN Junior Fleming MD      pravastatin  40 mg Oral Daily With Audra Perez MD      senna  1 tablet Oral HS Sanjana Butcher PA-C         Subjective/ HPI: Patient seen and examined  Patients overnight issues or events were reviewed with nursing or staff during rounds or morning huddle session  New or overnight issues include the following:     Pt seen in his room  He denies dizziness today  Pt reports difficulty sleeping at night  He denies any other complaints  ROS:   A 10 point ROS was performed; negative except as noted above  Imaging:     No orders to display       *Labs /Radiology studies Reviewed  *Medications  reviewed and reconciled as needed  *Please refer to order section for additional ordered labs studies  *Case discussed with primary attending during morning huddle case rounds    Physical Examination:  Vitals:   Vitals:    06/12/22 1343 06/12/22 2059 06/13/22 0644 06/13/22 0841   BP: 90/58 91/54 105/73 121/72   BP Location: Left arm Left arm Left arm Left arm   Pulse: 82 89 90    Resp: 20 18 18    Temp: 97 7 °F (36 5 °C) 98 1 °F (36 7 °C) 98 °F (36 7 °C)    TempSrc: Oral Oral Oral    SpO2:  96% 96%    Weight:       Height:         GEN: No apparent distress, interactive  NEURO: Alert and oriented x3; extremely [place]  HEENT: Pupils are equal and reactive, EOMI, mucous membranes are moist, face symmetrical  CV: S1 S2 regular, no MRG, no peripheral edema noted  RESP: Lungs are clear bilaterally, no wheezes, rales or rhonchi noted, on room air, respirations easy and non labored  GI: Flat, soft non tender, non distended; +BS x4  : Voiding without difficulty  MUSC: Moves all extremities; frail  SKIN: pink, warm and dry, normal turgor, incisions intact    The above physical exam was reviewed and updated as determined by my evaluation of the patient on 6/13/2022  Invasive Devices  Report    None                    VTE Pharmacologic Prophylaxis: Heparin  Code Status: Level 1 - Full Code  Current Length of Stay: 7 day(s)      Total time spent:  30 minutes with more than 50% spent counseling/coordinating care   Counseling includes discussion with patient re: progress  and discussion with patient of his/her current medical state/information  Coordination of patient's care was performed in conjunction with primary service  Time invested included review of patient's labs, vitals, and management of their comorbidities with continued monitoring  In addition, this patient was discussed with medical team including physician and advanced extenders  The care of the patient was extensively discussed and appropriate treatment plan was formulated unique for this patient  ** Please Note:  voice to text software may have been used in the creation of this document   Although proof errors in transcription or interpretation are a potential of such software**

## 2022-06-13 NOTE — PROGRESS NOTES
06/13/22 1400   Pain Assessment   Pain Assessment Tool 0-10   Pain Score No Pain   Restrictions/Precautions   Precautions Bed/chair alarms;Cognitive; Fall Risk;Hard of hearing;Seizure;Supervision on toilet/commode   Weight Bearing Restrictions No   ROM Restrictions No   Braces or Orthoses Other (Comment)  (abd binder, thigh high TEDs)   Cognition   Overall Cognitive Status Impaired   Arousal/Participation Alert; Cooperative   Attention Attends with cues to redirect   Orientation Level Oriented X4   Memory Decreased recall of precautions   Following Commands Follows one step commands with increased time or repetition   Roll Left and Right   Type of Assistance Needed Supervision   Comment HOB flat no rails, increased time and VC's for sequencing   Roll Left and Right CARE Score 4   Sit to Lying   Type of Assistance Needed Supervision   Comment HOB flat no rails   Sit to Lying CARE Score 4   Lying to Sitting on Side of Bed   Type of Assistance Needed Supervision   Comment HOB flat no rails   Lying to Sitting on Side of Bed CARE Score 4   Sit to Stand   Type of Assistance Needed Supervision   Comment CS   Sit to Stand CARE Score 4   Bed-Chair Transfer   Type of Assistance Needed Supervision   Comment CS   Chair/Bed-to-Chair Transfer CARE Score 4   Transfer Bed/Chair/Wheelchair   Adaptive Equipment None   Walk 10 Feet   Type of Assistance Needed Supervision   Comment CS   Walk 10 Feet CARE Score 4   Walk 50 Feet with Two Turns   Type of Assistance Needed Incidental touching;Supervision   Comment CS/CGA with gait belt   Walk 50 Feet with Two Turns CARE Score 4   Walk 150 Feet   Type of Assistance Needed Incidental touching   Comment CGA with gait belt   Walk 150 Feet CARE Score 4   Ambulation   Does the patient walk? 2  Yes   Primary Mode of Locomotion Prior to Admission Walk   Distance Walked (feet) 150 ft  (x3)   Gait Pattern Inconsistant Maria Victoria;Decreased foot clearance;Narrow ILIANA;Shuffle;Step to; Improper weight shift;Decreased R stance   Limitations Noted In Balance; Endurance; Heel Strike; Safety;Speed;Strength;Swing   Provided Assistance with: Direction   Walk Assist Level Close Supervision;Contact Guard   Wheel 50 Feet with Two Turns   Reason if not Attempted Activity not applicable   Wheel 50 Feet with Two Turns CARE Score 9   Wheel 150 Feet   Reason if not Attempted Activity not applicable   Wheel 076 Feet CARE Score 9   Wheelchair mobility   Does the patient use a wheelchair? 0  No   Curb or Single Stair   Style negotiated Single stair   Type of Assistance Needed Physical assistance; Adaptive equipment   Physical Assistance Level 25% or less   Comment RHR ascending on FF   1 Step (Curb) CARE Score 3   4 Steps   Type of Assistance Needed Physical assistance; Adaptive equipment   Physical Assistance Level 25% or less   Comment RHR ascending on FF   4 Steps CARE Score 3   12 Steps   Type of Assistance Needed Physical assistance; Adaptive equipment   Physical Assistance Level 25% or less   Comment RHR ascending on FF   12 Steps CARE Score 3   Stairs   Type Stairs   # of Steps 12   Weight Bearing Precautions Fall Risk   Assist Devices Single Rail   Picking Up Object   Type of Assistance Needed Physical assistance   Physical Assistance Level 25% or less   Comment no AD   Picking Up Object CARE Score 3   Therapeutic Interventions   Strengthening seated LAQ and hip flex end of session 2/2 dizziness 3x10 reps   Balance weaving thru cones x4 reps, retrieving cones from floor with DEREK no AD, no dizziness noted, alt toe taps to cones 2 x10 reps with MODA for balance, standing on blue balance disc then closing his eyes x1 min, tandem stance x1 min each and standing ball toss with CGA   Equipment Use   NuStep L2 x10 min SPM 60-80   Other Comments   Comments Pt seated /75, standing 130/76   Had episode of dizziness after bed mobility BP droped to 117/75, increased rest time gien and then performed seated TE's to help bring it back up  Had pt recline in chair initially but did not subside  Assessment   Treatment Assessment Pt participated in skilled PT session with increased focus on balance, increased coordination, increased gait and improved insight into deficits  Pt noted improved gait but cont to lack gait speed and also requires VC's to look up  Pt cont to be limited by decreased endurance, decreased insight into deficits, increased dizziness at times with low BP's, decreased balance and poor coordination  Pt was able to complete FF of steps with RHR ascending and LHR descending with DEREK and step to gait pattern  Pt had difficulty following directions at times as he is extremely [place] and did not want t utilize pocket taker  Pt will cont to work towards increased I with all gait and functional transfers to decrease burden of care  Cont POC as tolerated  Problem List Decreased strength;Decreased endurance; Impaired balance;Decreased mobility; Decreased coordination;Decreased cognition; Impaired judgement;Decreased safety awareness; Impaired tone; Impaired sensation   Barriers to Discharge Inaccessible home environment;Decreased caregiver support   PT Barriers   Functional Limitation Car transfers;Stair negotiation;Standing;Transfers; Walking   Plan   Treatment/Interventions Functional transfer training;LE strengthening/ROM; Therapeutic exercise; Endurance training;Gait training   Progress Progressing toward goals   Recommendation   PT Discharge Recommendation Home with outpatient rehabilitation   Equipment Recommended   (TBD)   PT Therapy Minutes   PT Time In 1400   PT Time Out 1530   PT Total Time (minutes) 90   PT Mode of treatment - Individual (minutes) 90   PT Mode of treatment - Concurrent (minutes) 0   PT Mode of treatment - Group (minutes) 0   PT Mode of treatment - Co-treat (minutes) 0   PT Mode of Treatment - Total time(minutes) 90 minutes   PT Cumulative Minutes 535   Therapy Time missed   Time missed?  No

## 2022-06-13 NOTE — PROGRESS NOTES
06/13/22 0700   Pain Assessment   Pain Assessment Tool 0-10   Pain Score No Pain   Restrictions/Precautions   Precautions Cognitive; Fall Risk;Bed/chair alarms;Hard of hearing;Seizure;Supervision on toilet/commode   Braces or Orthoses   (abd binder, thigh high TEDs)   Lifestyle   Autonomy "I am not dizzy"   Eating   Type of Assistance Needed Set-up / clean-up   Physical Assistance Level No physical assistance   Eating CARE Score 5   Oral Hygiene   Type of Assistance Needed Incidental touching   Physical Assistance Level No physical assistance   Comment CGA in stance   Oral Hygiene CARE Score 4   Shower/Bathe Self   Type of Assistance Needed Physical assistance   Physical Assistance Level 25% or less   Comment UB while seated, Assist to wash lower legs, CGA in stance for buttocks   Shower/Bathe Self CARE Score 3   Bathing   Assessed Bath Style Sponge Bath   Tub/Shower Transfer   Reason Not Assessed Sponge Bath;Medical   Findings shower not completed d/t thigh high TEDs adn ABD binder   Upper Body Dressing   Type of Assistance Needed Supervision   Physical Assistance Level No physical assistance   Upper Body Dressing CARE Score 4   Lower Body Dressing   Type of Assistance Needed Physical assistance   Physical Assistance Level 25% or less   Comment Aries to thread BLE and CGA in stance for CM over hips   Lower Body Dressing CARE Score 3   Putting On/Taking Off Footwear   Type of Assistance Needed Physical assistance   Physical Assistance Level 76% or more   Comment Able to pull thigh high TEDs over knees once donned to shin height  Assist for socks and sneakers as pt not able to reach to feet  [place] not appropriate to trial d/t arthritis and carpal tunnel in digits as well as pt's difficultly w/ new learning     Putting On/Taking Off Footwear CARE Score 2   Sit to Stand   Type of Assistance Needed Incidental touching   Physical Assistance Level No physical assistance   Sit to Stand CARE Score 4   Commmunity Re-entry   Community Re-entry fxnl mobility no AD CGA-Aries intermittent HHA   Functional Standing Tolerance   Comments Stood at elevated table top for 7 5min while compelting large peg board task w/ CGA  Completed to challenge B hand function d/t dec FMC/S  Tolerated well and completed task until requiring seatd rest break d/t fatigue  Exercise Tools   Other Exercise Tool 1 pt engages in additional UE strengthening using 5# dowel bar, completing 3x10 of the following exercises: shoulder press, shoulder flexion, chest press, elbow flex/ext  good tolerance to exercises with rest breaks as needed to manage fatigue  strengthening completed in order to inc overall strength and endurance for ADLs/transfers  Cognition   Overall Cognitive Status Impaired   Arousal/Participation Alert; Cooperative   Attention Attends with cues to redirect   Orientation Level Oriented X4   Memory Decreased recall of precautions   Following Commands Follows one step commands with increased time or repetition   Activity Tolerance   Activity Tolerance Patient tolerated treatment well   Assessment   Treatment Assessment OT session focusing on basic ADL completion, UE TE, challenging fxnl standing tolerance  pt is progressing towards OT goals but would benefit from additional IPOT prior to D stephani in order to dec caregiver burden at time of DC, considering his prirmary support is his wife and she is also 83y/o  Problem List Decreased strength;Decreased range of motion;Decreased endurance; Impaired balance;Decreased mobility; Decreased coordination; Impaired judgement;Decreased safety awareness;Decreased cognition; Impaired hearing; Impaired sensation   Plan   Treatment/Interventions ADL retraining;Functional transfer training; Therapeutic exercise; Endurance training;Cognitive reorientation;Patient/family training;Equipment eval/education; Compensatory technique education;Continued evaluation   OT Therapy Minutes   OT Time In 0700   OT Time Out 0830 OT Total Time (minutes) 90   OT Mode of treatment - Individual (minutes) 90   OT Mode of treatment - Concurrent (minutes) 0   OT Mode of treatment - Group (minutes) 0   OT Mode of treatment - Co-treat (minutes) 0   OT Mode of Treatment - Total time(minutes) 90 minutes   OT Cumulative Minutes 540   Therapy Time missed   Time missed?  No

## 2022-06-13 NOTE — PROGRESS NOTES
PM&R PROGRESS NOTE:  Cloteal Jaime 79 y o  male MRN: [id number]  Unit/Bed#: -02 Encounter: [id number]        Rehabilitation Diagnosis: Impairment of mobility, safety and Activities of Daily Living (ADLs) due to Brain Dysfunction:  02 22  Traumatic, Closed Injury    HPI: Patient is a 80 yo male with h/o fall and L SDH which was being monitored by neurosx with serial CTH however patient's recent OP CTH showed expansion of L SDH with increasing midline shift therefore patient underwent L craniotomy for SDH evacuation on 5/28 by Dr Alicia Jones  A L MMA embolization was attempted as well however was aborted due to anatomic variation that would not allow for the procedure  SUBJECTIVE: Patient seen face to face  No acute issues  Progressing as expected in rehabilitation  Spoke with patient and wife today  Sleeping remains a barrier here  He denies fever, chills, nausea, emesis, cough, shortness of breath, diarrhea, or constipation  He voided just prior to my arrival, urine yellow and clear  ASSESSMENT: Stable, progressing      PLAN:    Rehabilitation   Functional deficits:  Self care, mobility   Continue current rehabilitation plan of care to maximize function       Functional update:   o PT:Transfers min A, Ambulation HHA Mod A for short distance, total A for longer distances (200x2, 150, 50ft x2)  o OT: Toileting Mod A  o SLP: mild cog deficits   Estimated Discharge: TBD      Pain   PRN acetaminophen, Oxycodone 2 5-5mg Q4H PRN    DVT prophylaxis   heparin    Bladder plan   Continent    Bowel plan   Continent, Last BM 6/12      Assessment/Plan:     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: was being monitored with serial CTH however recent OP CTH showed expansion of L SDH with increased midline shift therefore patient underwent L craniotomy for SDH evacuation on 5/28 by Dr Alicia Jones, a L MMA embolization was attempted but ultimately aborted due to anatomic variation that would not allow for procedure, s/p keppra course for ppx, goal SBP<160 per neurosx, holding AP/AC/NSAIDs per neurosx (not on AC/AP agents at home PTA) but was cleared by neurosx in acute care for [facility] for DVT ppx and patient was started on HSQ in acute care on 6/3; OP FU with neurosx scheduled for 6/15 and 7/7 with FU CTH 2-3 days PTA per neurosx protocol (CTH x 2 already e-prescribed in EMR by neurosx team)     Dyslipidemia: therapeutic substitution for home zocor 20 mg qpm      Urinary retention: per d/w spouse appears to have symptoms of enlarged prostate at home, started on flomax in acute care however IM dc'd due to low BPs, checking PVRs (last 2 PVRs were 170 & 0)      Thrombocytosis: mild at 432 (), likely reactive, IM monitoring      h/o rt caudate lacunar infarct: incidental finding on imaging in acute care, therapeutic substitution for home zocor 20 mg qpm, not on AP/AC at home however not currently a candidate for AP/AC due to 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 per neurosx recs     Hypotension: started on midodrine by IM and is now improved      Insomnia- Currently on 6mg of melatonin, wife asking if a stronger medication  Discussed that the stronger options of ambien and lunesta are habit forming hypnotics and can be more risk than benefit      Incidental findings:     1) abnormalities on echocardiogram including but not limited to grade 1 DD & abnormal septal motion     2) abnormalities on EKG including but not limited to wide QRS/RBBB     3) mild left maxillary and left ethmoid mucosal thickening       No new labs to review      ROS:  A ten point review of systems was completed on 06/13/22 and pertinent positives are listed in subjective section  All other systems reviewed were negative         OBJECTIVE:   /72 (BP Location: Left arm) Comment: Teds and ABD binder on  Pulse 90   Temp 98 °F (36 7 °C) (Oral)   Resp 18   Ht 5' 11" (1 803 m)   Wt 75 8 kg (167 lb 1 7 oz)   SpO2 96%   BMI 23 31 kg/m²     Physical Exam  Constitutional: General: He is not in acute distress  Appearance: Normal appearance  HENT:      Head: Normocephalic and atraumatic  Right Ear: External ear normal       Left Ear: External ear normal       Ears:      Comments: Hard of hearing even with amplifier     Nose: Nose normal  No rhinorrhea  Mouth/Throat:      Mouth: Mucous membranes are moist       Pharynx: Oropharynx is clear  Eyes:      General: No scleral icterus  Extraocular Movements: Extraocular movements intact  Cardiovascular:      Rate and Rhythm: Normal rate and regular rhythm  Pulses: Normal pulses  Pulmonary:      Effort: Pulmonary effort is normal  No respiratory distress  Breath sounds: No wheezing or rales  Abdominal:      General: Bowel sounds are normal  There is no distension  Palpations: Abdomen is soft  Musculoskeletal:      Cervical back: Normal range of motion  Right lower leg: No edema  Left lower leg: No edema  Skin:     General: Skin is warm and dry  Neurological:      Mental Status: He is alert and oriented to person, place, and time  Sensory: No sensory deficit  Motor: No weakness        Coordination: Coordination abnormal    Psychiatric:         Mood and Affect: Mood normal          Behavior: Behavior normal           Lab Results   Component Value Date    WBC 5 16 06/06/2022    HGB 13 3 06/06/2022    HCT 38 6 06/06/2022    MCV 92 06/06/2022     (H) 06/06/2022     Lab Results   Component Value Date    SODIUM 138 06/06/2022    K 4 0 06/06/2022     06/06/2022    CO2 29 06/06/2022    BUN 15 06/06/2022    CREATININE 0 70 06/06/2022    GLUC 93 06/06/2022    CALCIUM 9 3 06/06/2022     Lab Results   Component Value Date    INR 1 02 06/02/2022    INR 1 10 05/29/2022    INR 1 04 05/28/2022    PROTIME 13 0 06/02/2022    PROTIME 13 7 05/29/2022    PROTIME 13 2 05/28/2022           Current Facility-Administered Medications:     acetaminophen (TYLENOL) tablet 650 mg, 650 mg, Oral, Q6H PRN, Bryan Lott MD, 650 mg at 06/12/22 0931    bisacodyl (DULCOLAX) rectal suppository 10 mg, 10 mg, Rectal, Daily PRN, Bryan Lott MD    docusate sodium (COLACE) capsule 100 mg, 100 mg, Oral, BID, Sanjana Butcher PA-C, 100 mg at 06/13/22 0840    heparin (porcine) subcutaneous injection 5,000 Units, 5,000 Units, Subcutaneous, Q8H [facility], Bryan Lott MD, 5,000 Units at 06/13/22 8409    lidocaine (XYLOCAINE) 2 % topical gel, , Topical, BID PRN, Bryan Lott MD    melatonin tablet 6 mg, 6 mg, Oral, HS, Bryan Lott MD, 6 mg at 06/12/22 2112    midodrine (PROAMATINE) tablet 2 5 mg, 2 5 mg, Oral, Daily With Lunch, RONY Jones, 2 5 mg at 06/12/22 1346    [START ON 6/14/2022] midodrine (PROAMATINE) tablet 2 5 mg, 2 5 mg, Oral, Daily, Sanjana Butcher PA-C    oxyCODONE (ROXICODONE) IR tablet 5 mg, 5 mg, Oral, Q4H PRN, 5 mg at 06/09/22 0654 **OR** oxyCODONE (ROXICODONE) IR tablet 2 5 mg, 2 5 mg, Oral, Q4H PRN, Bryan Lott MD    polyethylene glycol (MIRALAX) packet 17 g, 17 g, Oral, Daily PRN, Bryan Lott MD    pravastatin (PRAVACHOL) tablet 40 mg, 40 mg, Oral, Daily With Dang Colvin MD, 40 mg at 06/12/22 1711    senna (SENOKOT) tablet 8 6 mg, 1 tablet, Oral, HS, Sanjana Butcher PA-C    Past Medical History:   Diagnosis Date    Arthritis     Encounter for general adult medical examination without abnormal findings 3/20/2019    Hyperlipidemia        Patient Active Problem List    Diagnosis Date Noted    Constipation 06/02/2022    Urinary retention 06/02/2022    SDH (subdural hematoma) ([facility] [address] ) 05/27/2022    Primary osteoarthritis of left knee 05/17/2022    Hygroma 04/26/2022    Right hand pain     Carpal tunnel syndrome on right     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[facility] [address] ) 09/02/2021    Overweight (BMI 25 0-29 9) 01/27/2021    Negative depression screening 09/26/2019    Medicare annual wellness visit, subsequent 03/20/2019    Hypercholesterolemia 07/12/2018    Borderline hypertension 07/12/2018    Hearing loss 04/08/2009          Orestes Isbell, DO  Physical Medicine and [facility]

## 2022-06-14 PROCEDURE — 97110 THERAPEUTIC EXERCISES: CPT

## 2022-06-14 PROCEDURE — 97530 THERAPEUTIC ACTIVITIES: CPT

## 2022-06-14 PROCEDURE — 97129 THER IVNTJ 1ST 15 MIN: CPT

## 2022-06-14 PROCEDURE — 97112 NEUROMUSCULAR REEDUCATION: CPT

## 2022-06-14 PROCEDURE — 99232 SBSQ HOSP IP/OBS MODERATE 35: CPT | Performed by: INTERNAL MEDICINE

## 2022-06-14 PROCEDURE — 97535 SELF CARE MNGMENT TRAINING: CPT

## 2022-06-14 PROCEDURE — 92507 TX SP LANG VOICE COMM INDIV: CPT

## 2022-06-14 PROCEDURE — 99233 SBSQ HOSP IP/OBS HIGH 50: CPT | Performed by: STUDENT IN AN ORGANIZED HEALTH CARE EDUCATION/TRAINING PROGRAM

## 2022-06-14 RX ADMIN — HEPARIN SODIUM 5000 UNITS: 5000 INJECTION INTRAVENOUS; SUBCUTANEOUS at 13:17

## 2022-06-14 RX ADMIN — HEPARIN SODIUM 5000 UNITS: 5000 INJECTION INTRAVENOUS; SUBCUTANEOUS at 21:14

## 2022-06-14 RX ADMIN — CARBIDOPA AND LEVODOPA 2.5 MG: 50; 200 TABLET, EXTENDED RELEASE ORAL at 13:19

## 2022-06-14 RX ADMIN — HEPARIN SODIUM 5000 UNITS: 5000 INJECTION INTRAVENOUS; SUBCUTANEOUS at 05:42

## 2022-06-14 RX ADMIN — CARBIDOPA AND LEVODOPA 2.5 MG: 50; 200 TABLET, EXTENDED RELEASE ORAL at 06:16

## 2022-06-14 RX ADMIN — Medication 6 MG: at 21:14

## 2022-06-14 RX ADMIN — PRAVASTATIN SODIUM 40 MG: 40 TABLET ORAL at 17:09

## 2022-06-14 NOTE — CASE MANAGEMENT
Cm met with pt and his wife to review team meeting update  They are in agreement with d/c on 6/23 with outpt PT and OT at [facility]  Following to assist w/ d/c planning needs

## 2022-06-14 NOTE — PROGRESS NOTES
06/14/22 0830   Pain Assessment   Pain Assessment Tool 0-10   Pain Score 5   Pain Location/Orientation Orientation: Left; Location: Knee   Pain Onset/Description Onset: Gradual;Frequency: Intermittent   Hospital Pain Intervention(s) Repositioned; Rest  (asked if he wanted nursing to priovide him meds but denied )   Restrictions/Precautions   Precautions Bed/chair alarms;Cognitive; Fall Risk;Hard of hearing;Seizure;Supervision on toilet/commode   Weight Bearing Restrictions No   ROM Restrictions No   Braces or Orthoses Other (Comment)  (abd binder, thigh high TEDs)   Cognition   Overall Cognitive Status Impaired   Arousal/Participation Alert; Cooperative   Attention Attends with cues to redirect   Orientation Level Oriented X4   Memory Decreased recall of precautions   Following Commands Follows one step commands with increased time or repetition   Sit to Stand   Type of Assistance Needed Supervision   Comment CS no AD   Sit to Stand CARE Score 4   Bed-Chair Transfer   Type of Assistance Needed Supervision   Comment CS no AD   Chair/Bed-to-Chair Transfer CARE Score 4   Transfer Bed/Chair/Wheelchair   Adaptive Equipment None   Walk 10 Feet   Type of Assistance Needed Supervision   Comment CS no AD   Walk 10 Feet CARE Score 4   Walk 50 Feet with Two Turns   Type of Assistance Needed Supervision; Incidental touching   Comment CS/CGA no AD   Walk 50 Feet with Two Turns CARE Score 4   Walk 150 Feet   Type of Assistance Needed Incidental touching;Supervision   Comment CS/CGA no AD   Walk 150 Feet CARE Score 4   Ambulation   Does the patient walk? 2  Yes   Primary Mode of Locomotion Prior to Admission Walk   Distance Walked (feet) 150 ft  (x3, 50')   Gait Pattern Inconsistant Maria Victoria; Slow Maria Victoria;Decreased foot clearance; Forward Flexion;Narrow ILIANA;Shuffle;Step to; Improper weight shift;Decreased R stance   Limitations Noted In Balance; Coordination; Heel Strike;Posture; Safety;Strength;Speed   Provided Assistance with: Direction Walk Assist Level Close Supervision;Contact Guard   Wheel 50 Feet with Two Turns   Reason if not Attempted Activity not applicable   Wheel 50 Feet with Two Turns CARE Score 9   Wheel 150 Feet   Reason if not Attempted Activity not applicable   Wheel 045 Feet CARE Score 9   Wheelchair mobility   Does the patient use a wheelchair? 0  No   Curb or Single Stair   Style negotiated Single stair   Type of Assistance Needed Physical assistance; Incidental touching; Adaptive equipment   Physical Assistance Level 25% or less   Comment RHR ascending CGA/DEREK, nonreciprocal gait   1 Step (Curb) CARE Score 3   4 Steps   Type of Assistance Needed Physical assistance; Incidental touching; Adaptive equipment   Physical Assistance Level 25% or less   Comment RHR ascending CGA/DEREK, nonreciprocal gait   4 Steps CARE Score 3   12 Steps   Type of Assistance Needed Physical assistance; Incidental touching; Adaptive equipment   Physical Assistance Level 25% or less   Comment RHR ascending CGA/DEREK, nonreciprocal gait   12 Steps CARE Score 3   Stairs   Type Stairs   # of Steps 12   Weight Bearing Precautions Fall Risk   Assist Devices Single Rail   Findings FF no rest breaks  Toilet Transfer   Type of Assistance Needed Supervision; Incidental touching   Comment stood to use urinal, VC's to manage himself as he waits for help  [address] [facility] for clothing management   Toilet Transfer CARE Score 4   Therapeutic Interventions   Strengthening educated on seated marches with any dizziness that he may have  Balance alt toe taps to 8" step with CGA, walking ball toss fwd/bwds with CGA, no overt LOB but may stagger at times  Equipment Use   NuStep L2 x12 min SPM 60-80   Other Comments   Comments seated /73, standing 110/68, overall asymptomatic but mild dizziness noted with looking up/down frequently     Assessment   Treatment Assessment Pt participated in skilled PT session with increased focus on gait, increased balance, coordination and stair management  Pt noted improved righting reactions this session as pt does stagger but able to correct I  Pt did c/o mild pain in L knee when on NuStep which he perceived to be from arthritis  Pt asked if he wanted nursing to see if he can have something for pain but he denied  Pt worked on increased coordination with alt to taps and noted mild lightheadedness from looking up/down several times  Pt sat down and began marching which helped reduce lightheadedness  Pt will cont to benefit from improved balance, increased coordination, FT with wife to determine d/c date and cont endurance training  Cont POC as tolerated  Problem List Decreased strength;Decreased endurance; Impaired balance;Decreased mobility; Decreased coordination;Decreased safety awareness; Impaired tone   Barriers to Discharge Inaccessible home environment;Decreased caregiver support   PT Barriers   Functional Limitation Car transfers;Stair negotiation;Standing;Transfers; Walking   Plan   Treatment/Interventions Functional transfer training;LE strengthening/ROM; Therapeutic exercise; Endurance training;Patient/family training   Progress Progressing toward goals   Recommendation   PT Discharge Recommendation Home with outpatient rehabilitation   PT Therapy Minutes   PT Time In 0830   PT Time Out 0930   PT Total Time (minutes) 60   PT Mode of treatment - Individual (minutes) 60   PT Mode of treatment - Concurrent (minutes) 0   PT Mode of treatment - Group (minutes) 0   PT Mode of treatment - Co-treat (minutes) 0   PT Mode of Treatment - Total time(minutes) 60 minutes   PT Cumulative Minutes 595   Therapy Time missed   Time missed?  No

## 2022-06-14 NOTE — PLAN OF CARE
Problem: PAIN - ADULT  Goal: Verbalizes/displays adequate comfort level or baseline comfort level  Description: Interventions:  - Encourage patient to monitor pain and request assistance  - Assess pain using appropriate pain scale  - Administer analgesics based on type and severity of pain and evaluate response  - Implement non-pharmacological measures as appropriate and evaluate response  - Consider cultural and social influences on pain and pain management  - Notify physician/advanced practitioner if interventions unsuccessful or patient reports new pain  Outcome: Progressing     Problem: INFECTION - ADULT  Goal: Absence or prevention of progression during hospitalization  Description: INTERVENTIONS:  - Assess and monitor for signs and symptoms of infection  - Monitor lab/diagnostic results  - Monitor all insertion sites, i e  indwelling lines, tubes, and drains  - Monitor endotracheal if appropriate and nasal secretions for changes in amount and color  - [place] appropriate cooling/warming therapies per order  - Administer medications as ordered  - Instruct and encourage patient and family to use good hand hygiene technique  - Identify and instruct in appropriate isolation precautions for identified infection/condition  Outcome: Progressing  Goal: Absence of fever/infection during neutropenic period  Description: INTERVENTIONS:  - Monitor WBC    Outcome: Progressing

## 2022-06-14 NOTE — PROGRESS NOTES
06/14/22 1000   Pain Assessment   Pain Assessment Tool 0-10   Pain Score No Pain   Restrictions/Precautions   Precautions Bed/chair alarms;Cognitive; Fall Risk;Hard of hearing;Seizure;Supervision on toilet/commode  (thigh high TEDs, abdominal binder)   Weight Bearing Restrictions No   ROM Restrictions No   Braces or Orthoses Other (Comment)  (thigh high TEDs and abdominal binder)   General   Change In Medical/Functional Status Performed orthostatic BPs, Pt asymptomatic and no drop in SBP  Doc under vital section  Next visit trial just abdominal binder to reassess orthostatic vitals   Cognition   Overall Cognitive Status Impaired   Arousal/Participation Alert; Cooperative   Attention Attends with cues to redirect   Memory Decreased recall of recent events;Decreased recall of precautions   Following Commands Follows one step commands with increased time or repetition   Comments Pt [place], trialed pocket talker but it did not help   Subjective   Subjective Pt was received in therapy gym  Agreeable to PT, Dizziness denied throughout session   Bed-Chair Transfer   Type of Assistance Needed Supervision   Physical Assistance Level No physical assistance   Comment CS   Chair/Bed-to-Chair Transfer CARE Score 4   Walk 10 Feet   Type of Assistance Needed Supervision   Physical Assistance Level No physical assistance   Comment CS   Walk 10 Feet CARE Score 4   Walk 50 Feet with Two Turns   Type of Assistance Needed Supervision   Physical Assistance Level No physical assistance   Comment CS   Walk 50 Feet with Two Turns CARE Score 4   Walk 150 Feet   Type of Assistance Needed Supervision   Physical Assistance Level No physical assistance   Comment CS   Walk 150 Feet CARE Score 4   Walking 10 Feet on Uneven Surfaces   Type of Assistance Needed Supervision   Physical Assistance Level No physical assistance   Comment no AD outside ramp; CS   Walking 10 Feet on Uneven Surfaces CARE Score 4   Ambulation   Does the patient walk? 2   Yes Primary Mode of Locomotion Prior to Admission Walk   Distance Walked (feet) 400 ft  (30x2)   Assist Device   (no AD)   Gait Pattern Inconsistant Maria [place]; Slow Maria Victoria; Improper weight shift; Step through   Limitations Noted In [address]; Coordination;Posture;Speed   Provided Assistance with: Direction   Walk Assist Level Close Supervision   Findings Pt able to walk outside parking lot loop  CS and multi task, able to tell if safe to cross street, however required verbal and tactile cues to slow down  Additional person with W/c follow for safety however pt did not require any rest breaks  Wheelchair mobility   Does the patient use a wheelchair? 0  No   Curb or Single Stair   Style negotiated Curb   Type of Assistance Needed Incidental touching   Physical Assistance Level No physical assistance   Comment curb outside, without VC   1 Step (Curb) CARE Score 4   Therapeutic Interventions   Neuromuscular Re-Education Pt instructed to go into gift shop and find a chocolate bar and pair of earrings, demo multi tasking, navigating tight spaces, and memory  He required CS, no AD, and VC to scan shop for items  Able to find chocolate bar, increased VC to scan for earrings  Assessment   Treatment Assessment Pt participated in 30 min skilled PT session that focused on functional mobility and community reintegration training  Orthostatic vitals assessed and WFL, asymptomatic with thigh high TEDs and abdominal binder  He required CS or ambualtion outside parking lot and VC to cross street after scanning left and right for safety  Pt able to determine when safe to cross street  Able to complete curb step with CS, no VC required  Multi-task training in gift shop required VC to remember second item and to scan the store with CS  Pt would cont to benefit from skilled PT to improve balance, coordination, and functional mobility with increased independence  Will cont POC and progress as able   Next visit assess orthostatics without TEDs and incoorporate cognitive training (location finding, improved scanning) with ambulation  Family/Caregiver Present no   Problem List Impaired balance;Decreased mobility; Decreased coordination;Decreased cognition; Impaired hearing   Barriers to Discharge Decreased caregiver support; Inaccessible home environment   PT Barriers   Physical Impairment Impaired balance;Decreased mobility; Decreased coordination;Decreased cognition; Impaired hearing   Functional Limitation Car transfers; Ramp negotiation;Stair negotiation;Standing;Transfers; Walking   Plan   Treatment/Interventions Functional transfer training;LE strengthening/ROM; Elevations; Therapeutic exercise; Endurance training;Patient/family training;Bed mobility;Gait training;Spoke to MD;Spoke to nursing;Spoke to advanced practitioner;Spoke to case management   Progress Progressing toward goals   Recommendation   PT Discharge Recommendation Home with outpatient rehabilitation   PT - OK to Discharge No   PT Therapy Minutes   PT Time In 1000   PT Time Out 1030   PT Total Time (minutes) 30   PT Mode of treatment - Individual (minutes) 30   PT Mode of treatment - Concurrent (minutes) 0   PT Mode of treatment - Group (minutes) 0   PT Mode of treatment - Co-treat (minutes) 0   PT Mode of Treatment - Total time(minutes) 30 minutes   PT Cumulative Minutes 625   Therapy Time missed   Time missed?  No

## 2022-06-14 NOTE — PLAN OF CARE
Problem: PAIN - ADULT  Goal: Verbalizes/displays adequate comfort level or baseline comfort level  Description: Interventions:  - Encourage patient to monitor pain and request assistance  - Assess pain using appropriate pain scale  - Administer analgesics based on type and severity of pain and evaluate response  - Implement non-pharmacological measures as appropriate and evaluate response  - Consider cultural and social influences on pain and pain management  - Notify physician/advanced practitioner if interventions unsuccessful or patient reports new pain  Outcome: Not Progressing     Problem: INFECTION - ADULT  Goal: Absence or prevention of progression during hospitalization  Description: INTERVENTIONS:  - Assess and monitor for signs and symptoms of infection  - Monitor lab/diagnostic results  - Monitor all insertion sites, i e  indwelling lines, tubes, and drains  - Monitor endotracheal if appropriate and nasal secretions for changes in amount and color  - [place] appropriate cooling/warming therapies per order  - Administer medications as ordered  - Instruct and encourage patient and family to use good hand hygiene technique  - Identify and instruct in appropriate isolation precautions for identified infection/condition  Outcome: Not Progressing  Goal: Absence of fever/infection during neutropenic period  Description: INTERVENTIONS:  - Monitor WBC    Outcome: Not Progressing     Problem: SAFETY ADULT  Goal: Patient will remain free of falls  Description: INTERVENTIONS:  - Educate patient/family on patient safety including physical limitations  - Instruct patient to call for assistance with activity   - Consult OT/PT to assist with strengthening/mobility   - Keep Call bell within reach  - Keep bed low and locked with side rails adjusted as appropriate  - Keep care items and personal belongings within reach  - Initiate and maintain comfort rounds  - Make Fall Risk Sign visible to staff  - Offer Toileting every 2 Hours, in advance of need  - Initiate/Maintain bed and chair alarm  - Obtain necessary fall risk management equipment: alarms  - Apply yellow socks and bracelet for high fall risk patients  - Consider moving patient to room near nurses station  Outcome: Not Progressing  Goal: Maintain or return to baseline ADL function  Description: INTERVENTIONS:  -  Assess patient's ability to carry out ADLs; assess patient's baseline for ADL function and identify physical deficits which impact ability to perform ADLs (bathing, care of mouth/teeth, toileting, grooming, dressing, etc )  - Assess/evaluate cause of self-care deficits   - Assess range of motion  - Assess patient's mobility; develop plan if impaired  - Assess patient's need for assistive devices and provide as appropriate  - Encourage maximum independence but intervene and supervise when necessary  - Involve family in performance of ADLs  - Assess for home care needs following discharge   - Consider OT consult to assist with ADL evaluation and planning for discharge  - Provide patient education as appropriate  Outcome: Not Progressing  Goal: Maintains/Returns to pre admission functional level  Description: INTERVENTIONS:  - Perform BMAT or MOVE assessment daily    - Set and communicate daily mobility goal to care team and patient/family/caregiver  - Collaborate with rehabilitation services on mobility goals if consulted  - Perform Range of Motion 3 times a day  - Reposition patient every 2 hours    - Dangle patient 2 times a day  - Stand patient 3 times a day  - Ambulate patient 3 times a day  - Out of bed to chair 3 times a day   - Out of bed for meals 3 times a day  - Out of bed for toileting  - Record patient progress and toleration of activity level   Outcome: Not Progressing     Problem: DISCHARGE PLANNING  Goal: Discharge to home or other facility with appropriate resources  Description: INTERVENTIONS:  - Identify barriers to discharge w/patient and caregiver  - Arrange for needed discharge resources and transportation as appropriate  - Identify discharge learning needs (meds, wound care, etc )  - Arrange for interpretive services to assist at discharge as needed  - Refer to Case Management Department for coordinating discharge planning if the patient needs post-hospital services based on physician/advanced practitioner order or complex needs related to functional status, cognitive ability, or social support system  Outcome: Not Progressing     Problem: Prexisting or High Potential for Compromised Skin Integrity  Goal: Skin integrity is maintained or improved  Description: INTERVENTIONS:  - Identify patients at risk for skin breakdown  - Assess and monitor skin integrity  - Assess and monitor nutrition and hydration status  - Monitor labs   - Assess for incontinence   - Turn and reposition patient  - Assist with mobility/ambulation  - Relieve pressure over bony prominences  - Avoid friction and shearing  - Provide appropriate hygiene as needed including keeping skin clean and dry  - Evaluate need for skin moisturizer/barrier cream  - Collaborate with interdisciplinary team   - Patient/family teaching  - Consider wound care consult   Outcome: Not Progressing     Problem: Potential for Falls  Goal: Patient will remain free of falls  Description: INTERVENTIONS:  - Educate patient/family on patient safety including physical limitations  - Instruct patient to call for assistance with activity   - Consult OT/PT to assist with strengthening/mobility   - Keep Call bell within reach  - Keep bed low and locked with side rails adjusted as appropriate  - Keep care items and personal belongings within reach  - Initiate and maintain comfort rounds  - Make Fall Risk Sign visible to staff  - Offer Toileting every 2 Hours, in advance of need  - Initiate/Maintain bed and chair alarm  - Obtain necessary fall risk management equipment: alarms  - Apply yellow socks and bracelet for high fall risk patients  - Consider moving patient to room near nurses station  Outcome: Not Progressing     Problem: Nutrition/Hydration-ADULT  Goal: Nutrient/Hydration intake appropriate for improving, restoring or maintaining nutritional needs  Description: Monitor and assess patient's nutrition/hydration status for malnutrition  Collaborate with interdisciplinary team and initiate plan and interventions as ordered  Monitor patient's weight and dietary intake as ordered or per policy  Utilize nutrition screening tool and intervene as necessary  Determine patient's food preferences and provide high-protein, high-caloric foods as appropriate       INTERVENTIONS:  - Monitor oral intake, urinary output, labs, and treatment plans  - Assess nutrition and hydration status and recommend course of action  - Evaluate amount of meals eaten  - Assist patient with eating if necessary   - Allow adequate time for meals  - Recommend/ encourage appropriate diets, oral nutritional supplements, and vitamin/mineral supplements  - Order, calculate, and assess calorie counts as needed  - Recommend, monitor, and adjust tube feedings and TPN/PPN based on assessed needs  - Assess need for intravenous fluids  - Provide specific nutrition/hydration education as appropriate  - Include patient/family/caregiver in decisions related to nutrition  Outcome: Not Progressing

## 2022-06-14 NOTE — TEAM CONFERENCE
Acute RehabilitationTeam Conference Note  Date: 6/14/2022   Time: 10:41 AM       Patient Name:  Rafael Sandoval       Medical Record Number: [id number]   YOB: 1940  Sex: Male          Room/Bed:  [facility] 459/[facility] 459-01  Payor Info:  Payor: MEDICARE / Plan: MEDICARE A AND B / Product Type: Medicare A & B Fee for Service /      Admitting Diagnosis: Acute on chronic intracranial subdural hematoma ([facility] ) [I62 01, I62 03]   Admit Date/Time:  6/6/2022  2:13 PM  Admission Comments: No comment available     Primary Diagnosis:  SDH (subdural hematoma) ([facility])  Principal Problem: SDH (subdural hematoma) ([facility] )    Patient Active Problem List    Diagnosis Date Noted    Constipation 06/02/2022    Urinary retention 06/02/2022    SDH [facility]subdural hematoma) ([facility] ) 05/27/2022    Primary osteoarthritis of left knee 05/17/2022    Hygroma 04/26/2022    Right hand pain     Carpal tunnel syndrome on right     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[facility] ) 09/02/2021    Overweight (BMI 25 0-29 9) 01/27/2021    Negative depression screening 09/26/2019    Medicare annual wellness visit, subsequent 03/20/2019    Hypercholesterolemia 07/12/2018    Borderline hypertension 07/12/2018    Hearing loss 04/08/2009       Physical Therapy:    Weight Bearing Status: Full Weight Bearing  Transfers: Incidental Touching, Supervision  Bed Mobility: Supervision  Amulation Distance (ft): 150 feet  Ambulation: Incidental Touching  Assistive Device for Ambulation:  (none)  Number of Stairs: 12 (FF)  Assistive Device for Stairs: Right Hand Rail  Stair Assistance: Minimal Assistance  Discharge Recommendations: [facility]:  [address] [facility] with[de-identified] Family Support, 24 Hour Assisteance    6/13/22  Pt cont to show improved gait and is now requiring DEREK no AD up to 150' with gait belt used   Pt transfers CS/CGA, performed 12 steps with DEREK and RHR and bed mobility with S  Pt remains limited by decreased balance, decreased insight into deficits, poor coordination, poor righting reactions, decreased gait speed and poor endurance  Pt also remains limited as he is extremely [place], pocket talker is made available but pt refuses to utilize  Pt will cont to benefit from cont rehab services to increase I with all gait and functional transfers to decrease burden of care  Occupational Therapy:  Eating: Independent  Grooming: Minimal Assistance  Bathing: Minimal Assistance  Bathing: Minimal Assistance  Upper Body Dressing: Minimal Assistance  Lower Body Dressing: Minimal Assistance  Toileting: Minimal Assistance  Toilet Transfer: Minimal Assistance  Cognition: Exceptions to WNL  Cognition: Decreased Memory, Decreased Safety  Orientation: Person, Place, Time, Situation  Discharge Recommendations: Home with:  [address] with[de-identified] Family Support, Home Occupational Therapy       79 y o  male with PMH including hearing impairment, arthritis and hyperlipidemia and history of a recent fall off of a ladder end of March 2022, impacted his head, no LOC  Pt did not go to the ER  On 4/12/2022 patient fell down several stairs when his left knee gave out, hitting his forehead sustaining a laceration  Pt found to have L SDH which was being monitored by neurosx with serial CTH however patient's recent OP CTH showed expansion of L SDH with increasing midline shift therefore patient underwent L craniotomy for SDH evacuation on 5/28 by Dr Emmanuelle Luzestic  L MMA embolization was attempted as well however was aborted due to anatomic variation that would not allow for the procedure  Pt lives with his wife in a house with 1STE  Per chart, 1st floor setup is available upon d/c with half bath on first floor  Pt with difficulty answering questions about home setup and PLOF ([place] vs cog)  Per chart pt owns [facility] but was not using any AD PTA  Pt with orthostatic BP and symptomatic reporting dizziness upon sitting and standing  Pt completing functional transfers with MinAx1 w/ RW   Pt requiring Min with UB ADLs and Min with LB ADLs  Pt presenting with impaired standing balance, activity tolerance, motor control, coordination, functional strength and cognition and communication that are impacting his ability to complete B/IADLs and functional transfers/mobility independently and safely  Pt would benefit from additional IPOT to achieve SUP/mod(I) goals  Anticipate Reteam at this time  Speech Therapy:           ST orders received with plans to complete cognitive linguistic/language evaluation on 6/7/22  Evaluation results and recommendations to follow  Update from week 6/13/2022: Pt completed the Informal Language Assessment as well a portions of the Informal Cognitive assessment Overall pt is demonstrating minimal deficits noted in overall verbal comprehension given significant [place] deficit to which pocket talker was initially used but verbal presentation given information was decreased in pt's ability to respond accurately or appropriately  SLP did utilize written expression for all structured question and tasks presented as result of hearing deficit  Pt did appear to have fairly good comprehension given information BUT noted to have slower processing when answering multiple questions  As for pt's speech output, pt noted to gesture numbers and verbalize short 1-2 word answers  Pt did state to SLP about how he feels his "speech" is not good despite pt's ability to complete object naming, responsive naming, automatic speech tasks w/o difficulty  When attempting to have pt verbalize longer utterances, that is when it was noted that pt did demonstrate slower speech output, prolongation given sounds for words, etc  In regards to basic functional cognitive skills, pt was demonstrating good insight to problem solving, reasoning, organization  Overall, suspect that his cognitive linguistic skills are fairly close to prior level of functioning, but [place] status impacts abilities for certain tasks at this time   Continued use of pocket talker has been used across therapies but pt does consistently state that "I can't hear you" even w/ use  When using white board, pt's comprehension and ability to complete tasks improve  Spoke w/ pt's wife at the end of session, providing update in regards to assessment, to which she does report that they tend to use their own "sign language" to communicate at home as pt's hearing has worsened over the years  SLP providing update in adequate comprehension skills but slower processing noted when answering questions, etc  Additionally, stated to wife that while LTM recall was functional, suspect there will be decreased ST memory and difficulty in completing new learning tasks, skills which will be introduced while in the acute rehab center  Focus has been towards eliciting more speech output as pt has been relying on 1-2 word responses, in which pt can elaborate more expression for describing pictures given increased time  Functional level for pt at this time are supervision level for both language and cognitive skills  Currently, pt will benefit from brief f/u for SLP services at this time to maximize overall cognitive linguistic skills as well as targeting expressive language skills to decrease overall caregiver burden by time of discharge  Nursing Notes:  Appetite: Good  Diet Type: Regular/House                      Diet Patient/Family Education Complete:  Yes                            Bladder: Continent     Bladder Patient/Family Education: Yes  Bowel: Continent     Bowel Patient/Family Education: Yes  Pain Location/Orientation: Orientation: Left, Location: Back  Pain Score: 0                       Hospital Pain Intervention(s): Medication (See MAR), Repositioned, Ambulation/increased activity, Relaxation technique, Rest  Pain Patient/Family Education: Yes  Medication Management/Safety  Injectable:  (heparin)  Safe Administration: Yes (by staff)  Medication Patient/Family Education Complete: No (on going)    SDH s/p Lt craniotomy - Monitor incisions  , Follow-up with Neurosurgery in 2 weeks, Keppra for 7 days for seizure prophylaxis  Hyperlipidemia - Continue statin, Low cholesterol diet  Urinary retention Was placed on flomax on acute side Now with orthostasis dc'd flomax 6/7  Orthostasis Encourage fluids Off flomax as above Despite thigh high teds/abdominal binder c/w orthostasis Add low dose midodrine in the am and prn dose at lunch time orthostasis improved with midodrine  [place] Assistive device at bedside  Pt is continent of both bowel and bladder  Pt requires alarms for safety  This week we will monitor lab values and vital signs  We will educate on the importance of turning and offloading in order to prevent skin breakdown and preform routine skin checks  We will encourage independence with ADLs  We will preform safe transfers and keep the pt free from falls  We will monitor for constipation and medicate per bowel protocol  Case Management:     Discharge Planning  Living Arrangements: Lives w/ Spouse/significant other  Support Systems: Spouse/significant other, Children  Assistance Needed: n/a  Type of Current Residence: Private residence  Current Home Care Services: No  6/14/22    He lives with his wife in a two story home  He stays on the first floor with a first floor bedroom and bathroom with a walk in shower  No DME is available for use  He has had outpt therapy for carpal tunnel at [facility]  He uses [facility] DR JOCELINE HERRERA in [employer]  Reviewed team meeting process and potential LOS  Following to assist w/ d/c planning needs  Is the patient actively participating in therapies?  yes  List any modifications to the treatment plan:     Barriers Interventions   Hearing impairments  Whiteboard, pocket talker   Difficulty communicating  Therapy exercises   Impaired balance and righting reacitons Improving, therapy exercises   Orthostatis Nonsymptomatic, monitoring, TEDS, stocking    Fine motor skills Family training     Is the patient making expected progress toward goals? YES  List any update or changes to goals:     Medical Goals: Patient will be medically stable for discharge to [facility] upon completion of rehab program and Patient will be able to manage medical conditions and comorbid conditions with medications and follow up upon completion of rehab program    Weekly Team Goals:   Rehab Team Goals  ADL Team Goal: Patient will require supervision with ADLs with least restrictive device upon completion of rehab program  Bowel/Bladder Team Goal: Patient will require assist with bladder/bowel management with least restrictive device upon completion of rehab program  Transfer Team Goal: Patient will be independent with transfers with least restrictive device upon completion of rehab program  Locomotion Team Goal: Patient will require supervision with locomotion with least restrictive device upon completion of rehab program  Cognitive Team Goal: Patient will require supervision for basic and complex tasks upon completion of rehab program    Discussion: Pt presents with the above barriers  He will be signed off by speech therapy  He functioning at [facility] level  He is min a for UB ADLs and max a for footwear  Recommendations are for outpt PT and OT  Anticipated Discharge Date:  6/23  [facility] Team Members Present: The following team members are supervising care for this patient and were present during this Weekly Team Conference      Physician: Dr Madeline Alvarado MD  : LENCHO Juan  Registered Nurse: Cara Low RN, BSN  Physical Therapist: Gretel Masters DPT  Occupational Therapist: Kalpana Armendariz OTR/L  Speech Therapist: Aubree Norris, [id number] [facility]

## 2022-06-14 NOTE — PROGRESS NOTES
Internal Medicine Progress Note  Patient: Rafael Sandoval  Age/sex: 79 y o  male  Medical Record #: [id number]      ASSESSMENT/PLAN: (Interval History)  Rafael Sandoval is seen and examined and management for following issues:    SDH s/p Lt craniotomy  · Monitor incisions  · Follow-up with Neurosurgery in 2 weeks  · S/p Keppra for 7 days for seizure prophylaxis  Hyperlipidemia  · Continue statin  · Low cholesterol diet  Urinary retention  · Was placed on flomax on acute side  · dc'd flomax 6/7 d/t orthostasis    Orthostasis  · Encourage fluids  · Off flomax as above  · Cont TEDS/binder  · Cont low dose midodrine in the am and prn dose at lunch time  · orthostasis improved with midodrine    [place]  · Assistive device at bedside    DC planning: reteam    The above assessment and plan was reviewed and updated as determined by my evaluation of the patient on 6/14/2022  Labs: Invalid input(s): LABGLOM, CMP                Review of Scheduled Meds:  Current Facility-Administered Medications   Medication Dose Route Frequency Provider Last Rate    acetaminophen  650 mg Oral Q6H PRN Jacob Lyon MD      bisacodyl  10 mg Rectal Daily PRN Jacob Lyon MD      docusate sodium  100 mg Oral BID Sanjana Butcher PA-C      heparin (porcine)  5,000 Units Subcutaneous Q8H [facility] Jacob Lyon MD      lidocaine   Topical BID PRN Jacob Lyon MD      melatonin  6 mg Oral HS Jacob Lyon MD      midodrine  2 5 mg Oral Daily With Lunch RONY Jones      midodrine  2 5 mg Oral Daily Sanjana Butcher PA-C      oxyCODONE  5 mg Oral Q4H PRN MD David Judge oxyCODONE  2 5 mg Oral Q4H PRN Jacob Lyon MD      polyethylene glycol  17 g Oral Daily PRN Jacob Lyon MD      pravastatin  40 mg Oral Daily With Tahira Cerrato MD      senna  1 tablet Oral HS Sanjana Butcher PA-C         Subjective/ HPI: Patient seen and examined   Patients overnight issues or events were reviewed with nursing or staff during rounds or morning huddle session  New or overnight issues include the following:     Pt seen in his room  No overnight issues or concerns    ROS:   A 10 point ROS was performed; negative except as noted above  Imaging:     CT head wo contrast    (Results Pending)       *Labs /Radiology studies Reviewed  *Medications  reviewed and reconciled as needed  *Please refer to order section for additional ordered labs studies  *Case discussed with primary attending during morning huddle case rounds    Physical Examination:  Vitals:   Vitals:    06/13/22 1301 06/13/22 1500 06/13/22 2009 06/14/22 0540   BP: 109/57 105/56 122/57 131/80   BP Location: Left arm Left arm Left arm Right arm   Pulse:  80 72 81   Resp:  16 20 18   Temp:  97 8 °F (36 6 °C) 98 4 °F (36 9 °C) 98 3 °F (36 8 °C)   TempSrc:  Oral Oral Oral   SpO2:  94% 97% 95%   Weight:       Height:         GEN: No apparent distress, interactive  NEURO: Alert and oriented x3  HEENT: Pupils are equal and reactive, EOMI, mucous membranes are moist, face symmetrical  CV: S1 S2 regular, no MRG, no peripheral edema noted  RESP: Lungs are clear bilaterally, no wheezes, rales or rhonchi noted, on room air, respirations easy and non labored  GI: Flat, soft non tender, non distended; +BS x4  : Voiding without difficulty  MUSC: Moves all extremities; +generalized deconditioning  SKIN: pink, warm and dry, normal turgor, incisions intact      The above physical exam was reviewed and updated as determined by my evaluation of the patient on 6/14/2022  Invasive Devices  Report    None                    VTE Pharmacologic Prophylaxis: Heparin  Code Status: Level 1 - Full Code  Current Length of Stay: 8 day(s)      Total time spent:  30 minutes with more than 50% spent counseling/coordinating care  Counseling includes discussion with patient re: progress  and discussion with patient of his/her current medical state/information   Coordination of patient's care was performed in conjunction with primary service  Time invested included review of patient's labs, vitals, and management of their comorbidities with continued monitoring  In addition, this patient was discussed with medical team including physician and advanced extenders  The care of the patient was extensively discussed and appropriate treatment plan was formulated unique for this patient  ** Please Note:  voice to text software may have been used in the creation of this document   Although proof errors in transcription or interpretation are a potential of such software**

## 2022-06-14 NOTE — PROGRESS NOTES
06/14/22 0943   Pain Assessment   Pain Assessment Tool 0-10   Pain Score No Pain   Restrictions/Precautions   Precautions Bed/chair alarms; Fall Risk;Hard of hearing;Supervision on toilet/commode   Comprehension   Comprehension (FIM) 5 - Understands basic directions and conversation   Expression   Expression (FIM) 5 - Needs help/cues only RARELY (< 10% of the time)   Social Interaction   Social Interaction (FIM) 6 - Interacts appropriately with others BUT requires extra  time   Problem Solving   Problem solving (FIM) 5 - Solves basic problems 90% of time   Memory   Memory (FIM) 5 - Needs cueing reminders <10%   Speech/Language/Cognition Assessmetn   Treatment Assessment Pt just finishing PT session prior to ST session, which engaged pt in brief review given daily events  Pt was able to to engage in conversational speech which it was noted that he was using increased length of phrases today throughout session (noting short 4+ word sentences)  Of note, due to the increased complexity of information to be relayed by SLP, using white board to communicate to pt about how the team meeting is being held today  Pt did state to SLP that he wants to "go home " But when asking pt about how soon for discharge, when SLP put about another week, pt stated 2-3 more days   However, when SLP provided increased education in regards to how medically his BP's continue to fluctuate and still requires the binder and stockings, pt was able to comprehend this education, stating "I know it's not right yet " Pt was noted to be concerned in regards to wife having a doctor's appointment this AM and not being here yet, where he did mildly perseverate on "I hope she's ok " Otherwise, pt initiated other conversational speech in regards to his children, where they work, etc and his pets, where increased discussion was held about pt's 3 dogs, where pt's ability to fluently elicit information about all topics elicited, again noting longer sentences in his overall verbal communication  Additionally d/w pt about TV shows which he likes to watch ([employer] and [employer]) where pt had good recall given times these shows are on as well as the channel on as well as additional events about the winning person on [employer] in recent days  Lastly, pt did state to SLP about the [facility] status and how he has gone through 3 sets of HA's, but his dtr has found a new audiologist to which he will be seeing after he is discharged  Overall, pt's cognitive linguistic skills and overall verbalizations are at prior level of functioning, to which at this time, no further SLP services are warranted while remaining on the acute rehab center nor at time of discharge (as wife can assist w/ I ADL tasks once home), but pt will highly benefit from ongoing OT/PT services to maximize overall functional mobility skills while on the acute rehab center  SLP Therapy Minutes   SLP Time In 0930   SLP Time Out 1000   SLP Total Time (minutes) 30   SLP Mode of treatment - Individual (minutes) 30   SLP Mode of treatment - Concurrent (minutes) 0   SLP Mode of treatment - Group (minutes) 0   SLP Mode of treatment - Co-treat (minutes) 0   SLP Mode of Treatment - Total time(minutes) 30 minutes   SLP Cumulative Minutes 180   Therapy Time missed   Time missed?  No

## 2022-06-14 NOTE — PROGRESS NOTES
OT Treatment Note       06/14/22 0534   Pain Assessment   Pain Assessment Tool 0-10   Pain Score No Pain   Restrictions/Precautions   Precautions Bed/chair alarms; Fall Risk;Hard of hearing;Seizure;Supervision on toilet/commode  (abdominal binder)   Weight Bearing Restrictions No   ROM Restrictions No   Braces or Orthoses   (per MD, to trial abd binder without TEDs this session)   Lifestyle   Autonomy "I can't do it," referring to BM hygiene   Grooming   Findings c(s) in stance at sink   Putting On/Taking Off Footwear   Type of Assistance Needed Supervision;Verbal cues   Physical Assistance Level No physical assistance   Comment Pt's wife present and reports pt able to doff/don socks and shoes PTA  Pt able to doff/don socks seated in recliner with extended time  Pt able to doff/don boots using shoe horn, requiring increased time to tie shoes 2* impaired FMC  Pt using stool while donning shoes as this is what he uses at home  Pt quick to say, "I can't do it," for tasks but with encouragement and increased time, can complete  Family reporting they may purchase him different shoes, OT recommending alternative options like slip on shoes for increased ease  Putting On/Taking Off Footwear CARE Score 4   Lying to Sitting on Side of Bed   Type of Assistance Needed Supervision   Physical Assistance Level No physical assistance   Comment HOB flat   Lying to Sitting on Side of Bed CARE Score 4   Sit to Stand   Type of Assistance Needed Supervision   Physical Assistance Level No physical assistance   Comment close SUP without AD   Sit to Stand CARE Score 4   Bed-Chair Transfer   Type of Assistance Needed Supervision   Physical Assistance Level No physical assistance   Comment close SUP without AD   Chair/Bed-to-Chair Transfer CARE Score 4   Toileting Hygiene   Type of Assistance Needed Physical assistance   Physical Assistance Level 26%-50%   Comment Pt requesting assistance with BM hygiene, declining to attempt;  Able to complete CM with CGA  At end of session pt requiring setup with urinal 2* urgency, able to complete CM with CGA/close SUP   Toileting Hygiene CARE Score 3   Toilet Transfer   Type of Assistance Needed Incidental touching   Physical Assistance Level No physical assistance   Comment CGA/close SUP to toilet using grab bar   Toilet Transfer CARE Score 4   Exercise Tools   Other Exercise Tool 1 Pt participating in seated and standing therex for increased BUE strength, endurance and standing tolerance required for BADLs and functional transfers  In stance pt compelting chest press and shoulder flexion (0-90*) using 5# therabar, then completing seated bicep curls and lat pullbacks using 3# db  10x3 reps each with vc's for slow controlled movement with fair carryover  Cognition   Overall Cognitive Status Impaired   Arousal/Participation Alert; Cooperative   Attention Attends with cues to redirect   Orientation Level Oriented X4   Memory Decreased recall of recent events;Decreased recall of precautions   Following Commands Follows one step commands with increased time or repetition   Additional Activities   Additional Activities Comments Pt participating in standing tolerance and visual scanning activity scanning to match cards at tabletop ~5-7minutes with close SUP  Pt then functionally mobilizing around gym with CGA without AD to locate 12 cards, requiring mod vc's to scan completely to locate cards, also requiring min vc's to avoid obstacles on R side during mobility  Activity Tolerance   Activity Tolerance Patient tolerated treatment well   Assessment   Treatment Assessment Pt seen for 90 min OT session focusing on LB dressing, functional transfers/mobility, toileting, standing tolerance, BUE strengthening, FMC and visual scanning activities  Per MD pt to trial session without thigh high TEDs and only wearing abdominal binder   Pt able to tolerate full session with steady BP with only abdominal binder donned and no reports of dizziness throughout session  See vitals flowsheet for details  OT to continue POC to focus on toileting (BM hygiene), standing balance, activity tolerance, BUE strengthening and [id number], and family training prior to d/c home  Prognosis Good   Problem List Impaired balance;Decreased mobility; Decreased coordination;Decreased cognition;Decreased strength;Decreased endurance; Impaired hearing   Barriers to Discharge Inaccessible home environment;Decreased caregiver support   Plan   Treatment/Interventions ADL retraining;Functional transfer training; Therapeutic exercise; Endurance training;Cognitive reorientation;Patient/family training;Equipment eval/education; Bed mobility; Compensatory technique education   Progress Progressing toward goals   Recommendation   OT Discharge Recommendation Home with outpatient rehabilitation   OT Therapy Minutes   OT Time In 1230   OT Time Out 1400   OT Total Time (minutes) 90   OT Mode of treatment - Individual (minutes) 90   OT Mode of treatment - Concurrent (minutes) 0   OT Mode of treatment - Group (minutes) 0   OT Mode of treatment - Co-treat (minutes) 0   OT Mode of Treatment - Total time(minutes) 90 minutes   OT Cumulative Minutes 630   Therapy Time missed   Time missed?  No

## 2022-06-14 NOTE — PROGRESS NOTES
PM&R PROGRESS NOTE:  Manish Hernandez 79 y o  male MRN: [id number]  Unit/Bed#: -14 Encounter: [id number]        Rehabilitation Diagnosis: Impairment of mobility, safety and Activities of Daily Living (ADLs) due to Brain Dysfunction:  02 22  Traumatic, Closed Injury    HPI: Patient is a 78 yo male with h/o fall and L SDH which was being monitored by neurosx with serial CTH however patient's recent OP CTH showed expansion of L SDH with increasing midline shift therefore patient underwent L craniotomy for SDH evacuation on 5/28 by Dr An Meyers  A L MMA embolization was attempted as well however was aborted due to anatomic variation that would not allow for the procedure  SUBJECTIVE: Patient seen face to face  No acute issues  Still difficult to communicate even with amplifier so increased time for communication during visit  For follow up with neurosurgery tomorrow  Reached out to verify and will have repeat [address] tomorrow as well  Still with some insomnia  He states he wants to go home soon as he misses home, but knows his wife wants him to stay to get better  Provided encouragement  Discussed in team conference with plan for DC on 6/23 but have a feeling patient will not be willing to stay that long  He is doing well overall, but still more at a min A level with potential to become Modified independent to supervision prior to discharge  He denies fever, chills, nausea, emesis, cough, shortness of breath, diarrhea, or constipation  ASSESSMENT: Stable, progressing      PLAN:    Rehabilitation   Functional deficits:  Self care, mobility   Continue current rehabilitation plan of care to maximize function       Functional update:   Physical Therapy Occupational Therapy Speech Therapy   Weight Bearing Status: Full Weight Bearing  Transfers: Incidental Touching, Supervision  Bed Mobility: Supervision  Amulation Distance (ft): 150 feet  Ambulation: Incidental Touching  Assistive Device for Ambulation: (none)  Number of Stairs: 12 (FF)  Assistive Device for Stairs: Right Hand Rail  Stair Assistance: Minimal Assistance  Discharge Recommendations: Home with:  [address] with[de-identified] Family Support, 24 Hour Assisteance   Eating: Independent  Grooming: Minimal Assistance  Bathing: Minimal Assistance  Bathing: Minimal Assistance  Upper Body Dressing: Minimal Assistance  Lower Body Dressing: Minimal Assistance  Toileting: Minimal Assistance  Toilet Transfer: Minimal Assistance  Cognition: Exceptions to WNL  Cognition: Decreased Memory, Decreased Safety  Orientation: Person, Place, Time, Situation                  Estimated Discharge: 6/23      Pain   PRN acetaminophen, Oxycodone 2 5-5mg Q4H PRN    DVT prophylaxis   heparin    Bladder plan   Continent    Bowel plan   Continent, Last BM 6/13      Assessment/Plan:     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: was being monitored with serial CTH however recent OP CTH showed expansion of L SDH with increased midline shift therefore patient underwent L craniotomy for SDH evacuation on 5/28 by Dr Guera Scott, a L MMA embolization was attempted but ultimately aborted due to anatomic variation that would not allow for procedure, s/p keppra course for ppx, goal SBP<160 per neurosx, holding AP/AC/NSAIDs per neurosx (not on AC/AP agents at home PTA) but was cleared by neurosx in acute care for HSQ for DVT ppx and patient was started on HSQ in acute care on 6/3; OP FU with neurosx scheduled for 6/15 and 7/7 with FU CTH 2-3 days PTA per neurosx protocol ([facility] x 2 already e-prescribed in EMR by neurosx team)- discussed tomorrows planned follow up after [facility] with neurosurgery today      Dyslipidemia: therapeutic substitution for home zocor 20 mg qpm      Urinary retention: per d/w spouse appears to have symptoms of enlarged prostate at home, started on flomax in acute care however IM dc'd due to low BPs, checking PVRs (last 2 PVRs were 170 & 0)      Thrombocytosis: mild at 432 (), likely reactive     h/o rt caudate lacunar infarct: incidental finding on imaging in acute care, therapeutic substitution for home zocor 20 mg qpm, not on AP/AC at home however not currently a candidate for AP/AC due to 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 per neurosx recs     Hypotension: started on midodrine by IM and is now improved      Insomnia- Currently on 6mg of melatonin, wife asking if a stronger medication  Discussed that the stronger options of ambien and lunesta are habit forming hypnotics and can be more risk than benefit      Incidental findings:     1) abnormalities on echocardiogram including but not limited to grade 1 DD & abnormal septal motion     2) abnormalities on EKG including but not limited to wide QRS/RBBB     3) mild left maxillary and left ethmoid mucosal thickening       No new labs to review      ROS:  A ten point review of systems was completed on 06/14/22 and pertinent positives are listed in subjective section  All other systems reviewed were negative  OBJECTIVE:   /80 (BP Location: Right arm)   Pulse 81   Temp 98 3 °F (36 8 °C) (Oral)   Resp 18   Ht 5' 11" (1 803 m)   Wt 75 8 kg (167 lb 1 7 oz)   SpO2 95%   BMI 23 31 kg/m²     Physical Exam  Constitutional:       General: He is not in acute distress  Appearance: Normal appearance  HENT:      Head: Normocephalic and atraumatic  Right Ear: External ear normal       Left Ear: External ear normal       Ears:      Comments: Hard of hearing even with amplifier     Nose: Nose normal  No rhinorrhea  Mouth/Throat:      Mouth: Mucous membranes are moist       Pharynx: Oropharynx is clear  Eyes:      General: No scleral icterus  Extraocular Movements: Extraocular movements intact  Cardiovascular:      Rate and Rhythm: Normal rate and regular rhythm  Pulses: Normal pulses  Heart sounds: Normal heart sounds  Pulmonary:      Effort: Pulmonary effort is normal  No respiratory distress  Breath sounds: Normal breath sounds   No wheezing  Abdominal:      General: Bowel sounds are normal  There is no distension  Palpations: Abdomen is soft  Musculoskeletal:      Cervical back: Normal range of motion  No tenderness  Right lower leg: No edema  Left lower leg: No edema  Skin:     General: Skin is warm and dry  Neurological:      Mental Status: He is alert and oriented to person, place, and time  Sensory: No sensory deficit  Motor: No weakness  Coordination: Coordination abnormal    Psychiatric:         Mood and Affect: Mood normal          Behavior: Behavior normal          Thought Content:  Thought content normal           Lab Results   Component Value Date    WBC 5 16 06/06/2022    HGB 13 3 06/06/2022    HCT 38 6 06/06/2022    MCV 92 06/06/2022     (H) 06/06/2022     Lab Results   Component Value Date    SODIUM 138 06/06/2022    K 4 0 06/06/2022     06/06/2022    CO2 29 06/06/2022    BUN 15 06/06/2022    CREATININE 0 70 06/06/2022    GLUC 93 06/06/2022    CALCIUM 9 3 06/06/2022     Lab Results   Component Value Date    INR 1 02 06/02/2022    INR 1 10 05/29/2022    INR 1 04 05/28/2022    PROTIME 13 0 06/02/2022    PROTIME 13 7 05/29/2022    PROTIME 13 2 05/28/2022           Current Facility-Administered Medications:     acetaminophen (TYLENOL) tablet 650 mg, 650 mg, Oral, Q6H PRN, Jorge Spears MD, 650 mg at 06/12/22 0931    bisacodyl (DULCOLAX) rectal suppository 10 mg, 10 mg, Rectal, Daily PRN, Jorge Spears MD    docusate sodium (COLACE) capsule 100 mg, 100 mg, Oral, BID, Sanjana Butcher PA-C, 100 mg at 06/13/22 0840    heparin (porcine) subcutaneous injection 5,000 Units, 5,000 Units, Subcutaneous, Q8H [facility], Jorge Spears MD, 5,000 Units at 06/14/22 0542    lidocaine (XYLOCAINE) 2 % topical gel, , Topical, BID PRN, Jorge Spears MD    melatonin tablet 6 mg, 6 mg, Oral, HS, Jorge Spears MD, 6 mg at 06/13/22 2117    midodrine (PROAMATINE) tablet 2 5 mg, 2 5 mg, Oral, Daily With Lunch, RONY Bazzi, 2 5 mg at 06/13/22 1300    midodrine (PROAMATINE) tablet 2 5 mg, 2 5 mg, Oral, Daily, Sanjana Butcher PA-C, 2 5 mg at 06/14/22 0616    oxyCODONE (ROXICODONE) IR tablet 5 mg, 5 mg, Oral, Q4H PRN, 5 mg at 06/09/22 0654 **OR** oxyCODONE (ROXICODONE) IR tablet 2 5 mg, 2 5 mg, Oral, Q4H PRN, Marcie Black MD    polyethylene glycol (MIRALAX) packet 17 g, 17 g, Oral, Daily PRN, Marcie Black MD    pravastatin (PRAVACHOL) tablet 40 mg, 40 mg, Oral, Daily With Sylvester Portillo MD, 40 mg at 06/13/22 1703    senna (SENOKOT) tablet 8 6 mg, 1 tablet, Oral, HS, Sanjana Butcher PA-C, 8 6 mg at 06/13/22 2117    Past Medical History:   Diagnosis Date    Arthritis     Encounter for general adult medical examination without abnormal findings 3/20/2019    Hyperlipidemia        Patient Active Problem List    Diagnosis Date Noted    Constipation 06/02/2022    Urinary retention 06/02/2022    [facility]    Primary osteoarthritis of left knee 05/17/2022    Hygroma 04/26/2022    Right hand pain     Carpal tunnel syndrome on right     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[facility] ) 09/02/2021    Overweight (BMI 25 0-29 9) 01/27/2021    Negative depression screening 09/26/2019    Medicare annual wellness visit, subsequent 03/20/2019    Hypercholesterolemia 07/12/2018    Borderline hypertension 07/12/2018    Hearing loss 04/08/2009          Elizabeth Callejas DO  Physical Medicine and [address]    Total time spent:  40 minutes, with more than 50% spent counseling/coordinating care  Counseling includes discussion with patient re: progress in therapies, functional issues observed by therapy staff, and discussion with patient his/her current medical state/wellbeing  Coordination of patient's care was performed in conjunction with Internal Medicine service to monitor patient's labs, vitals, and management of their comorbidities   In addition, this patient was discussed by the interdisciplinary team in weekly case conference today  The care of the patient was extensively discussed with all care providers and an appropriate rehabilitation plan was formulated unique for this patient  Barriers were identified preventing progression of therapy and appropriate interventions were discussed with each discipline  Please see the team note for input from all disciplines regarding barriers, intervention, and discharge planning

## 2022-06-15 ENCOUNTER — APPOINTMENT (INPATIENT)
Dept: RADIOLOGY | Facility: HOSPITAL | Age: 82
DRG: 949 | End: 2022-06-15
Payer: MEDICARE

## 2022-06-15 PROCEDURE — 97535 SELF CARE MNGMENT TRAINING: CPT

## 2022-06-15 PROCEDURE — 70450 CT HEAD/BRAIN W/O DYE: CPT

## 2022-06-15 PROCEDURE — 97530 THERAPEUTIC ACTIVITIES: CPT

## 2022-06-15 PROCEDURE — 97112 NEUROMUSCULAR REEDUCATION: CPT

## 2022-06-15 PROCEDURE — 97110 THERAPEUTIC EXERCISES: CPT

## 2022-06-15 PROCEDURE — G1004 CDSM NDSC: HCPCS

## 2022-06-15 PROCEDURE — 99024 POSTOP FOLLOW-UP VISIT: CPT | Performed by: PHYSICIAN ASSISTANT

## 2022-06-15 PROCEDURE — 99232 SBSQ HOSP IP/OBS MODERATE 35: CPT | Performed by: INTERNAL MEDICINE

## 2022-06-15 PROCEDURE — 99233 SBSQ HOSP IP/OBS HIGH 50: CPT | Performed by: STUDENT IN AN ORGANIZED HEALTH CARE EDUCATION/TRAINING PROGRAM

## 2022-06-15 RX ADMIN — HEPARIN SODIUM 5000 UNITS: 5000 INJECTION INTRAVENOUS; SUBCUTANEOUS at 15:36

## 2022-06-15 RX ADMIN — HEPARIN SODIUM 5000 UNITS: 5000 INJECTION INTRAVENOUS; SUBCUTANEOUS at 21:12

## 2022-06-15 RX ADMIN — SENNOSIDES 8.6 MG: 8.6 TABLET, FILM COATED ORAL at 21:12

## 2022-06-15 RX ADMIN — DOCUSATE SODIUM 100 MG: 100 CAPSULE, LIQUID FILLED ORAL at 18:10

## 2022-06-15 RX ADMIN — CARBIDOPA AND LEVODOPA 2.5 MG: 50; 200 TABLET, EXTENDED RELEASE ORAL at 08:11

## 2022-06-15 RX ADMIN — HEPARIN SODIUM 5000 UNITS: 5000 INJECTION INTRAVENOUS; SUBCUTANEOUS at 05:32

## 2022-06-15 RX ADMIN — Medication 6 MG: at 21:12

## 2022-06-15 RX ADMIN — PRAVASTATIN SODIUM 40 MG: 40 TABLET ORAL at 15:36

## 2022-06-15 NOTE — DISCHARGE INSTRUCTIONS
DISCHARGE INSTRUCTIONS: Manjinder Beal [age]    Bring these instructions with you to your Outpatient Physician appointments so they can order and follow-up any additional lab work or imaging recommended at time of discharge  It  is you or your caregivers responsibility to obtain follow-up MEDICATION REFILLS  As indicated through your Primary Care Physician (PCP) and other outpatient specialty provider(s) after discharge  Please follow-up with your PCP as soon as possible after discharge to set-up follow-up management and when appropriate refills  You have been determined to have some cognitive impairments  - It is YOUR CAREGIVER'S RESPONSIBILITY to ensure appropriate follow-up which includes:  - APPOINTMENTS are scheduled and safe transportation is arranged  - LABS and IMAGING are completed  - MEDICATION MANAGEMENT at home is carried out appropriately     You remain a fall and injury risk which could be severe  - Your risk of fall has decreased however since admission to acute rehab  Caregiver training has been completed with our staff  - Appropriate supervision +/- assistance as instructed during your rehab course is recommended to decrease risk of fall and injury  - Continue skilled therapy as discussed after discharge to further decrease this risk    If you (or your health care proxy) have any questions or concerns regarding your acute rehabilitation stay including issues with medications, rehabilitation, and follow-up plan, please call:          [address] [place] [address] at [phone] or [phone]  Should you develop fevers, chills, new weakness, changes in sensation, difficulty speaking, facial weakness, confusion, shortness of breath, chest pain, or other concerning symptoms please call 911 and/or obtain transportation to nearest ER immediately      Should you develop worsening pain, swelling, or drainage notify your surgeon right away or obtain transportation to nearest ER for evaluation  PHYSICIANS to see:  Please see your doctors listed in the follow up providers section of your discharge paperwork, and take the discharge paperwork with you to your appointments  LAB WORK recommended after discharge: Follow-up lab work at discretion of your outpatient physicians to be determined at time of your future appointments  IMAGING to follow-up:  Follow-up imaging as discussed with your recent physicians or at discretion of your outpatient physicians to be determined at time of your appointments  Please [facility] Scheduling at [phone] to schedule or confirm your follow-up recommended studies:    CT Head ordered/recommended by Neurosurgery  Call this specialist with additional questions  Please have your CTH done 2-3 days prior to your 6 week post operative follow up visit with your neurosurgeon and bring the disk with the x-rays on them with you to the appointment; you have been provided an electronic prescription that can be used at [facility] radiology facilities however if you do not wish to use [facility] radiology services please contact [facility] [place] for a paper prescription to use at a radiology facility of your choosing and bring the disk with the x-rays/images on them to your appointment     ADDITIONAL FINDINGS and ISSUES to follow-up:  Low Blood Pressure:  - Please check blood pressure prior to taking midodrine  If top number > 150, do not give this medication   - Take prior to breakfast, and prior to lunch  - Do not take <4 hours before bed time  2  Subdural Hematoma (Bleeding around your brain)  - 6/14 CT Head improved  - Follow-up with Neurosurgery, and have CT Head done 2-3 days prior to visit  3  Findings on EKG (heart monitor)  - No acute management here  - Will need follow-up with PCP and Cardiology at discharge     4   Left knee osteoarthritis  - You can use topical voltaren gel up to 4x a day (2 grams), you can also try topical patches like lidocaine patches  These are meant to be placed on for 12 hours, and removed for 12 hours  - You can use compression brace  - You can also use up to 3g of Tylenol a day  - Follow-up with PCP for continued injections if necessary  Check under the "DISCHARGE PROVIDER" section of these DISCHARGE INSTRUCTIONS for provider specific issues as well  Excessive delay or lack of appropriate follow-up could potentially increase your risk of complications which could be severe and even life-threatening  It is you or your caregiver's responsibility to ensure these tests are ordered by your outpatient providers and followed up with accordingly  Driving restrictions: You are recommended against driving  Alcohol restrictions: You are recommended to not drink alcohol at this time unless cleared by an outpatient physician  Drinking alcohol in your current functional condition can increase your risk of injury which could be severe  Drinking alcohol given your current health problems can lead to increased medical complications which could be severe  Combining alcohol with your current medications can increase your risk of injury which could be severe  Smoking restrictions: You are recommended to not smoke nicotine  Smoking increases your risk of heart attack, stroke, emphysema/COPD, and lung cancer  MEDICATIONS:  Please see a full list of your medications outlined in the After Visit Summary that is attached to these Discharge Instructions  Please note changes may have been made to your medications please refer to your discharge paperwork for your current medications and take this list with you to all your doctors appointments for your doctors to review    Please do not resume a home medication unless the medication reconciliation sheet indicates to do so, please do not assume that a medication that you were given a prescription for is the same as a medication you have at home based on both medications having the same name as dosages and frequency may have changed  Unless specifically noted in your medication list provided to you in your discharge paper work do not resume prior vitamins, minerals, or supplements you may have been taking prior to your hospitalization unless instructed by an outpatient physician in the future  Discuss with your primary care at next visit if applicable  AVOID blood thinners and NSAIDs (Non-steroidal anti-inflammatory drugs): At this time, your medical providers are recommending you do NOT take any blood thinners unless instructed by another physician in the future  Follow-up with your primary care physician and if applicable appropriate specialists after discharge for follow-up management  For example: No Aspirin  No warfarin  No Eliquis  No Xarelto  No Advil  No Alleve  No Motrin/Ibuprofen  No Naproxen  No meloxicam  No oral diclofenac  Etc  MEDICAL MANAGEMENT AT HOME specific to you:    Please note a summary of your hospital stay with relevant information for your doctors will try to be sent to them  Please confirm with your doctors at your follow up visits that they have received this summary and have them contact [address] [place] [address] if they have not received them along with any other medical records they may require  Moni Faust Phone Number:  [phone]      Discharge Instructions  Evacuation of subdural hematoma    Activity:  Do not lift, push or pull more than 10 pounds for 2 weeks  Avoid bending, lifting and twisting for 2 weeks  No running  No athletic activities until cleared  No driving for at least 2 weeks or until cleared by Neurosurgery  When able to shower, continue to use clean towel and washcloth for 2 weeks post-op  Do not use a hair dryer, and avoid hair products such as mousse, oils, and gels   Do not brush your hair away from the incision since this will put strain on the suture line  Do not dye or perm hair for 6 weeks or until cleared by physician  Continue to change bed linens and pajamas more frequently  Wear clean clothes daily  May walk as tolerated  Recommend 4 short walks daily  Surgical incision care:  Keep dressings in place for 3 days  After 3 days, incisions may be left open to air, but should remain clean  Keep incisions dry for 3 days  May shower after 3 days using a baby shampoo including head incision  Rinse off shampoo and pat dry  Avoid rubbing the incision but gently massage hair  Do not immerse the incisions in water for 6 weeks  Staples/suture will be removed at your 2 week postoperative visit  Do not apply any creams or ointments to the incision, unless otherwise instructed by [facility]  Contact office if increasing redness, drainage, pain or swelling occurs around the incisions or if you develop a fever greater than 101F  Do not dye/perm hair or use any hair products until cleared by Neurosurgery  Postoperative medication:  [facility] will provide pain medication in the postoperative period  All prescriptions must come from a single practice  Take medications as prescribed  Call office with any questions/concerns  May use over the counter Tylenol  No NSAIDs (ie  Ibuprofen, Aleve, Advil, Naproxen)  Please contact office for questions regarding dosage and modifications  No antiplatelet or anticoagulation medication (ie  Coumadin, Aspirin, Plavix) until cleared by [employer], unless otherwise instructed  Please contact [facility] if you have any questions about the effects of any of your medications on blood clotting  Do not operate heavy machinery or vehicles while taking sedating medications  Use a bowel regimen while on opioids as they induce constipation   Ie  Senokot-S, Miralax, Colace, etc  Increase fiber and water intake  Follow-up for a 6 week post-operative visit  Please complete a repeat CT head without contrast to be completed prior to visit  Please notify the office if incision becomes red, swollen, tender, or has increased drainage, and temp>101  Return to the ER if you experience increased headache, drowsiness, weakness, nausea/vomiting, or seizures

## 2022-06-15 NOTE — PROGRESS NOTES
Internal Medicine Progress Note  Patient: Epifanio Schirmer  Age/sex: 79 y o  male  Medical Record #: [id number]      ASSESSMENT/PLAN: (Interval History)  Epifanio Schirmer is seen and examined and management for following issues:    SDH s/p Lt craniotomy  · Monitor incisions  · Follow-up with Neurosurgery in 2 weeks  · S/p Keppra for 7 days for seizure prophylaxis  Hyperlipidemia  · Continue statin  · Low cholesterol diet  Urinary retention  · Was placed on flomax on acute side  · dc'd flomax 6/7 d/t orthostasis    Orthostasis  · Encourage fluids  · Off flomax as above  · Currently using binder only  · No further dizziness  · Will cont current midodrine    [place]  · Assistive device at bedside    DC planning: reteam    The above assessment and plan was reviewed and updated as determined by my evaluation of the patient on 6/15/2022  Labs: Invalid input(s): LABBRIDGETTE, CMP                Review of Scheduled Meds:  Current Facility-Administered Medications   Medication Dose Route Frequency Provider Last Rate    acetaminophen  650 mg Oral Q6H PRN Masha Recinos MD      bisacodyl  10 mg Rectal Daily PRN Masha Recinos MD      docusate sodium  100 mg Oral BID Sanjana Butcher PA-C      heparin (porcine)  5,000 Units Subcutaneous Q8H [address] Masha Recinos MD      lidocaine   Topical BID PRN Masha Recinos MD      melatonin  6 mg Oral HS Masha Recinos MD      midodrine  2 5 mg Oral Daily With Lunch RONY Jones      midodrine  2 5 mg Oral Daily Sanjana Butcher PA-C      oxyCODONE  5 mg Oral Q4H PRN MD David Rollins oxyCODONE  2 5 mg Oral Q4H PRN Masha Recinos MD      polyethylene glycol  17 g Oral Daily PRJEAN PAUL Recinos MD      pravastatin  40 mg Oral Daily With Pearl Walls MD      senna  1 tablet Oral HS Sanjana Butcher PA-C         Subjective/ HPI: Patient seen and examined   Patients overnight issues or events were reviewed with nursing or staff during rounds or morning huddle session  New or overnight issues include the following:     Pt seen in his room  Requesting to go home  No overnight issues except that he hasn't been able to sleep well since he has been here    ROS:   A 10 point ROS was performed; negative except as noted above  Imaging:     CT head wo contrast   Final Result by Sadiq Combs DO (06/15 6052)      Interval evolution and decrease in size of the left-sided subdural collection/hematoma with associated improvement in the mass effect, and left to right midline shift, as described  No other significant interval change  Postsurgical changes and other findings as above  Clinical follow-up recommended              Workstation performed: [id number]             *Labs /Radiology studies Reviewed  *Medications  reviewed and reconciled as needed  *Please refer to order section for additional ordered labs studies  *Case discussed with primary attending during morning huddle case rounds    Physical Examination:  Vitals:   Vitals:    06/14/22 1400 06/14/22 1500 06/14/22 2100 06/15/22 0852   BP: 130/80 112/63 144/79 104/64   BP Location: Right arm Left arm Right arm Right arm   Pulse: 94 79 78 59   Resp:  17 17 18   Temp:  97 5 °F (36 4 °C) 97 7 °F (36 5 °C) 97 8 °F (36 6 °C)   TempSrc:  Oral Oral Oral   SpO2:  95% 94% 94%   Weight:       Height:         GEN: No apparent distress, interactive; frail  NEURO: Alert and oriented x3  HEENT: Pupils are equal and reactive, EOMI, mucous membranes are moist, face symmetrical; extremely [place] assistive device in place  CV: S1 S2 regular, no MRG, no peripheral edema noted  RESP: Lungs are clear bilaterally, no wheezes, rales or rhonchi noted, on room air, respirations easy and non labored  GI: Flat, soft non tender, non distended; +BS x4  : Voiding without difficulty  MUSC: Moves all extremities  SKIN: pink, warm and dry, normal turgor, no rashes, lesions    The above physical exam was reviewed and updated as determined by my evaluation of the patient on 6/15/2022  Invasive Devices  Report    None                    VTE Pharmacologic Prophylaxis: Heparin  Code Status: Level 1 - Full Code  Current Length of Stay: 9 day(s)      Total time spent:  30 minutes with more than 50% spent counseling/coordinating care  Counseling includes discussion with patient re: progress  and discussion with patient of his/her current medical state/information  Coordination of patient's care was performed in conjunction with primary service  Time invested included review of patient's labs, vitals, and management of their comorbidities with continued monitoring  In addition, this patient was discussed with medical team including physician and advanced extenders  The care of the patient was extensively discussed and appropriate treatment plan was formulated unique for this patient  ** Please Note:  voice to text software may have been used in the creation of this document   Although proof errors in transcription or interpretation are a potential of such software**

## 2022-06-15 NOTE — TELEPHONE ENCOUNTER
6/22/22- PT DISCHARGED  PLEASE SEE BEKA NOTE    6/21/22- [facility]    6/20/22- [facility]    6/17/22- [facility]    6/16/22- [facility]  6WK POV SCHEDULED 7/7/22  PT WILL NEED CT HEAD--SCHED 7/1    6/15/22- [facility]

## 2022-06-15 NOTE — PROGRESS NOTES
PM&R PROGRESS NOTE:  Rafael Sandoval 79 y o  male MRN: [id number]  Unit/Bed#: -72 Encounter: [id number]        Rehabilitation Diagnosis: Impairment of mobility, safety and Activities of Daily Living (ADLs) due to Brain Dysfunction:  02 22  Traumatic, Closed Injury    HPI: Patient is a 78 yo male with h/o fall and L SDH which was being monitored by neurosx with serial CTH however patient's recent OP CTH showed expansion of L SDH with increasing midline shift therefore patient underwent L craniotomy for SDH evacuation on 5/28 by Dr Alicia Jones  A L MMA embolization was attempted as well however was aborted due to anatomic variation that would not allow for the procedure  SUBJECTIVE: Patient seen and examined during therapy session  Ambulating in hallway with therapy using gait belt  For discharge 6/23 and agree he would benefit from that time here  Seen by Neurosurgery for follow up with [facility] completed 6/15  Interval decrease in size of hemtoma from 2 3cm to 1 5cm, will continue following with neurosurgery as an outpatient  Also BP elevated and HR elevated, but returns to normal with short rest  Midodrine changed to daily, no more indio stockings  Will plan to attempt without the abdominal binder tomorrow  He denies fever, chills, nausea, emesis, cough, shortness of breath, dizziness, or lightheadedness  ASSESSMENT: Stable, progressing      PLAN:    Rehabilitation   Functional deficits:  Self care, mobility   Continue current rehabilitation plan of care to maximize function       Functional update:   Physical Therapy Occupational Therapy Speech Therapy   Weight Bearing Status: Full Weight Bearing  Transfers: Incidental Touching, Supervision  Bed Mobility: Supervision  Amulation Distance (ft): 150 feet  Ambulation: Incidental Touching  Assistive Device for Ambulation:  (none)  Number of Stairs: 12 (FF)  Assistive Device for Stairs: Right Hand Rail  Stair Assistance: Minimal Assistance  Discharge Recommendations: Home with:  DC Home with[de-identified] Family Support, 24 Hour Assisteance   Eating: Independent  Grooming: Minimal Assistance  Bathing: Minimal Assistance  Bathing: Minimal Assistance  Upper Body Dressing: Minimal Assistance  Lower Body Dressing: Minimal Assistance  Toileting: Minimal Assistance  Toilet Transfer: Minimal Assistance  Cognition: Exceptions to WNL  Cognition: Decreased Memory, Decreased Safety  Orientation: Person, Place, Time, Situation                  Estimated Discharge: 6/23      Pain   PRN acetaminophen, Oxycodone 2 5-5mg Q4H PRN    DVT prophylaxis   heparin    Bladder plan   Continent    Bowel plan   Continent, Last BM 6/14      Assessment/Plan:     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: was being monitored with serial CTH however recent OP CTH showed expansion of L SDH with increased midline shift therefore patient underwent L craniotomy for SDH evacuation on 5/28 by Dr Beverley Son, a L MMA embolization was attempted but ultimately aborted due to anatomic variation that would not allow for procedure, s/p keppra course for ppx, goal SBP<160 per neurosx, holding AP/AC/NSAIDs per neurosx (not on AC/AP agents at home PTA) but was cleared by neurosx in acute care for HSQ for DVT ppx and patient was started on HSQ in acute care on 6/3; OP FU with neurosx scheduled for 6/15 and 7/7 with FU CTH 2-3 days PTA per neurosx protocol ([address] x 2 already e-prescribed in EMR by neurosx team)- [address] and neurosurgery follow up completed 6/14 and interval decrease in hematoma size       Dyslipidemia: therapeutic substitution for home zocor 20 mg qpm      Urinary retention: per d/w spouse appears to have symptoms of enlarged prostate at home, started on flomax in acute care however IM dc'd due to low BPs, checking PVRs (last 2 PVRs were 170 & 0)      Thrombocytosis: mild at 432 (), likely reactive     h/o rt caudate lacunar infarct: incidental finding on imaging in acute care, therapeutic substitution for home zocor 20 mg qpm, not on AP/AC at home however not currently a candidate for AP/AC due to 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 per neurosx recs     Hypotension: Midodrine decreased to 2 5mg daily, discontinued teds and plan for trial off abdominal binder tomorrow      Insomnia- Currently on 6mg of melatonin, missed home     Incidental findings:     1) abnormalities on echocardiogram including but not limited to grade 1 DD & abnormal septal motion     2) abnormalities on EKG including but not limited to wide QRS/RBBB     3) mild left maxillary and left ethmoid mucosal thickening       No new labs to review      ROS:  A ten point review of systems was completed on 06/15/22 and pertinent positives are listed in subjective section  All other systems reviewed were negative  OBJECTIVE:   /73 (BP Location: Right arm)   Pulse 99   Temp 97 8 °F (36 6 °C) (Oral)   Resp 18   Ht 5' 11" (1 803 m)   Wt 75 8 kg (167 lb 1 7 oz)   SpO2 94%   BMI 23 31 kg/m²     Physical Exam  Constitutional:       General: He is not in acute distress  Appearance: Normal appearance  HENT:      Head: Normocephalic and atraumatic  Right Ear: External ear normal       Left Ear: External ear normal       Ears:      Comments: Hard of hearing even with amplifier     Nose: Nose normal  No rhinorrhea  Mouth/Throat:      Mouth: Mucous membranes are moist       Pharynx: Oropharynx is clear  Eyes:      General: No scleral icterus  Extraocular Movements: Extraocular movements intact  Cardiovascular:      Rate and Rhythm: Normal rate and regular rhythm  Pulses: Normal pulses  Heart sounds: Normal heart sounds  Pulmonary:      Effort: Pulmonary effort is normal  No respiratory distress  Breath sounds: Normal breath sounds  No wheezing  Abdominal:      General: Bowel sounds are normal  There is no distension  Palpations: Abdomen is soft  Musculoskeletal:      Cervical back: Normal range of motion  No tenderness  Right lower leg: No edema  Left lower leg: No edema  Skin:     General: Skin is warm and dry  Neurological:      Mental Status: He is alert and oriented to person, place, and time  Sensory: No sensory deficit  Motor: No weakness  Coordination: Coordination abnormal    Psychiatric:         Mood and Affect: Mood normal          Behavior: Behavior normal          Thought Content:  Thought content normal           Lab Results   Component Value Date    WBC 5 16 06/06/2022    HGB 13 3 06/06/2022    HCT 38 6 06/06/2022    MCV 92 06/06/2022     (H) 06/06/2022     Lab Results   Component Value Date    SODIUM 138 06/06/2022    K 4 0 06/06/2022     06/06/2022    CO2 29 06/06/2022    BUN 15 06/06/2022    CREATININE 0 70 06/06/2022    GLUC 93 06/06/2022    CALCIUM 9 3 06/06/2022     Lab Results   Component Value Date    INR 1 02 06/02/2022    INR 1 10 05/29/2022    INR 1 04 05/28/2022    PROTIME 13 0 06/02/2022    PROTIME 13 7 05/29/2022    PROTIME 13 2 05/28/2022           Current Facility-Administered Medications:     acetaminophen (TYLENOL) tablet 650 mg, 650 mg, Oral, Q6H PRN, Jorge Spears MD, 650 mg at 06/12/22 0931    bisacodyl (DULCOLAX) rectal suppository 10 mg, 10 mg, Rectal, Daily PRN, Jorge Spears MD    docusate sodium (COLACE) capsule 100 mg, 100 mg, Oral, BID, Sanjana Butcher PA-C, 100 mg at 06/13/22 0840    heparin (porcine) subcutaneous injection 5,000 Units, 5,000 Units, Subcutaneous, Q8H [facility], Jorge Spears MD, 5,000 Units at 06/15/22 0532    lidocaine (XYLOCAINE) 2 % topical gel, , Topical, BID PRN, Jorge Spears MD    melatonin tablet 6 mg, 6 mg, Oral, HS, Jorge Spears MD, 6 mg at 06/14/22 2114    midodrine (PROAMATINE) tablet 2 5 mg, 2 5 mg, Oral, Daily With Lunch, RONY Jones, 2 5 mg at 06/14/22 1319    midodrine (PROAMATINE) tablet 2 5 mg, 2 5 mg, Oral, Daily, Sanjana Butcher PA-C, 2 5 mg at 06/15/22 0811    oxyCODONE (ROXICODONE) IR tablet 5 mg, 5 mg, Oral, Q4H PRN, 5 mg at 06/09/22 0654 **OR** oxyCODONE (ROXICODONE) IR tablet 2 5 mg, 2 5 mg, Oral, Q4H PRN, Nilsa Pro MD    polyethylene glycol (MIRALAX) packet 17 g, 17 g, Oral, Daily PRN, Nilsa Pro MD    pravastatin (PRAVACHOL) tablet 40 mg, 40 mg, Oral, Daily With Shefali Hahn MD, 40 mg at 06/14/22 1709    senna (SENOKOT) tablet 8 6 mg, 1 tablet, Oral, HS, Sanjana Butcher PA-C, 8 6 mg at 06/13/22 2117    Past Medical History:   Diagnosis Date    Arthritis     Encounter for general adult medical examination without abnormal findings 3/20/2019    Hyperlipidemia        Patient Active Problem List    Diagnosis Date Noted    Constipation 06/02/2022    Urinary retention 06/02/2022    SDH (subdural hematoma) ([facility] [address] ) 05/27/2022    Primary osteoarthritis of left knee 05/17/2022    Hygroma 04/26/2022    Right hand pain     Carpal tunnel syndrome on right     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[facility] [address] ) 09/02/2021    Overweight (BMI 25 0-29 9) 01/27/2021    Negative depression screening 09/26/2019    Medicare annual wellness visit, subsequent 03/20/2019    Hypercholesterolemia 07/12/2018    Borderline hypertension 07/12/2018    Hearing loss 04/08/2009          Lianne Ramirez DO  Physical Medicine and Benita Cast[facility]eda    Total time spent:  35 minutes, with more than 50% spent counseling/coordinating care  Counseling includes discussion with patient re: progress in therapies, functional issues observed by therapy staff, and discussion with patient his/her current medical state/wellbeing  Coordination of patient's care was performed in conjunction with Internal Medicine service to monitor patient's labs, vitals, and management of their comorbidities

## 2022-06-15 NOTE — PROGRESS NOTES
06/15/22 1330   Pain Assessment   Pain Assessment Tool 0-10   Pain Score No Pain   Restrictions/Precautions   Precautions Bed/chair alarms;Cognitive; Fall Risk;Hard of hearing;Supervision on toilet/commode   Weight Bearing Restrictions No   ROM Restrictions No   Braces or Orthoses   (abdominal binder)   General   Change In Medical/Functional Status Performed orthostatic BPs at start of session and doc under vitals, WFL and asymptomatic   Cognition   Overall Cognitive Status Impaired   Arousal/Participation Alert; Cooperative   Attention Attends with cues to redirect   Memory Decreased recall of precautions   Following Commands Follows one step commands with increased time or repetition   Comments Chin[place], pt denies pocket talker, communicated primarily with white board   Subjective   Subjective Pt was received in recliner, agreeable to PT   Denies any pain or dizziness   Roll Left and Right   Type of Assistance Needed Independent   Physical Assistance Level No physical assistance   Comment HOB flat no bed rail use   Roll Left and Right CARE Score 6   Sit to Lying   Type of Assistance Needed Independent   Physical Assistance Level No physical assistance   Comment HOB flat no bed rail use   Sit to Lying CARE Score 6   Lying to Sitting on Side of Bed   Type of Assistance Needed Independent   Physical Assistance Level No physical assistance   Comment HOB flat no bed rail use   Lying to Sitting on Side of Bed CARE Score 6   Sit to Stand   Type of Assistance Needed Supervision   Physical Assistance Level No physical assistance   Comment without AD   Sit to Stand CARE Score 4   Bed-Chair Transfer   Type of Assistance Needed Supervision   Physical Assistance Level No physical assistance   Comment without AD   Chair/Bed-to-Chair Transfer CARE Score 4   Walk 10 Feet   Type of Assistance Needed Supervision   Physical Assistance Level No physical assistance   Comment no AD   Walk 10 Feet CARE Score 4   Walk 50 Feet with Two Turns Type of Assistance Needed Supervision   Physical Assistance Level No physical assistance   Comment no AD   Walk 50 Feet with Two Turns CARE Score 4   Walk 150 Feet   Type of Assistance Needed Supervision   Physical Assistance Level No physical assistance   Comment CS no AD   Walk 150 Feet CARE Score 4   Ambulation   Does the patient walk? 2  Yes   Primary Mode of Locomotion Prior to Admission Walk   Distance Walked (feet) 200 ft  (200ftx 6, 50ftx5)   Assist Device   (no AD)   Gait Pattern Inconsistant Maria Victoria; Slow Maria Victoria; Improper weight shift; Step through   Limitations Noted In Heel Strike;Balance; Coordination; Sequencing;Speed;Strength;Posture   Provided Assistance with: Direction   Walk Assist Level Supervision   Wheel 50 Feet with Two Turns   Reason if not Attempted Activity not applicable   Wheel 50 Feet with Two Turns CARE Score 9   Wheel 150 Feet   Reason if not Attempted Activity not applicable   Wheel 725 Feet CARE Score 9   Wheelchair mobility   Does the patient use a wheelchair? 0  No   Curb or Single Stair   Style negotiated Single stair   Type of Assistance Needed Supervision; Incidental touching   Physical Assistance Level No physical assistance   Comment R HR; FF   1 Step (Curb) CARE Score 4   4 Steps   Type of Assistance Needed Supervision; Incidental touching   Physical Assistance Level No physical assistance   Comment RHR ascending, non reciprocal step pattern, occasional B/L UE support; FF   4 Steps CARE Score 4   12 Steps   Type of Assistance Needed Supervision; Incidental touching   Physical Assistance Level No physical assistance   Comment RHR ascending, non reciprocal step pattern, occasional B/L UE support; FF   12 Steps CARE Score 4   Toilet Transfer   Type of Assistance Needed Supervision   Physical Assistance Level No physical assistance   Comment CS using BSC over toilet;  Pt required supervision for personal hygiene   Toilet Transfer CARE Score 4   Therapeutic Interventions   Neuromuscular Re-Education Fwd walking for speed with arm swing 200ft x 2; Backwards walking 50ft x3, VC for bigger steps, arm swing, and to keep eyes straight ahead  Side stepping with tapping on shoulder for direction changes 20ft x3  Pt instructed dual task training scanning hallway (200ft x 4 reps) looking for room numbers practicing scanning with ambulation, and memory  Pt ambulated down hallway (200ft with laundry basket to retrieve clothing around hallway requiring multi-tasking with scanning, carrying basket of clothes during ambulation  Pt instructed for marching with reciprocal arm swing, however pt unable to complete  Vitals WFL after exercise  Equipment Use   NuStep L5x 12 min all 4 extremities, no pain aggravation, vitals assessed 97%SpO2, HR 105bpm, 5 min rest break after recovered HR 97bpm   Assessment   Treatment Assessment Pt seen for 90 min skilled PT session focusing on functional mobility, and dual task training with scanning environment  Pt able to complete stairs with RHE with CS-CGA with non reciprocal gait pattern  Pt able to complete ambulation with dual task training, required white board for clear instructions  Pt completed NPP and dual task ambulation scanning hallway, supervision level assist and without any overt LOB, however severe dec coordination noted with reciprocal arm swing  Pt vitals stable throughout session and doc in vitals section  Pt will cont to benefit from improved balance, inc coordination, FT with wife and endurance training to prepare for safe d/c home on 6/23  Pt agreeable to d/c date this session  Cont POC as tolerated  Next session complete 10mWT, trial orthostatic BPs without abdominal binder per MD order, and increased dual task training with scanning environment      Family/Caregiver Present no   Problem List Impaired balance;Decreased coordination;Decreased cognition; Impaired hearing   Barriers to Discharge Inaccessible home environment;Decreased caregiver support   PT Barriers   Physical Impairment Impaired balance;Decreased coordination;Decreased cognition; Impaired hearing   Functional Limitation Car transfers; Ramp negotiation;Stair negotiation;Standing;Walking;Transfers   Plan   Treatment/Interventions Functional transfer training;LE strengthening/ROM; Therapeutic exercise; Endurance training;Cognitive reorientation;Patient/family training;Equipment eval/education; Bed mobility;Gait training; Compensatory technique education;Spoke to MD;Spoke to nursing;Spoke to case management;Spoke to advanced practitioner   Progress Progressing toward goals   Recommendation   PT Discharge Recommendation Home with outpatient rehabilitation   PT - OK to Discharge No   PT Therapy Minutes   PT Time In 1330   PT Time Out 1500   PT Total Time (minutes) 90   PT Mode of treatment - Individual (minutes) 90   PT Mode of treatment - Concurrent (minutes) 0   PT Mode of treatment - Group (minutes) 0   PT Mode of treatment - Co-treat (minutes) 0   PT Mode of Treatment - Total time(minutes) 90 minutes   PT Cumulative Minutes 715   Therapy Time missed   Time missed?  No

## 2022-06-15 NOTE — ASSESSMENT & PLAN NOTE
POD18 left craniotomy for SDH evacuation (DKO, 5/28/22)   s/p Cerebral Angiography - Left MMA embolization was not performed 2/2 anatomy with ophthalmic artery  · Patient presented to the ED due to findings on CT scan of an acute on chronic SDH  · Very hard of hearing    Imaging:  · CT head wo 6/15/22: interval evolution and decrease in size of the left sided subdural collection/hematoma (prior 2 3cm, now 1 5cm) with associated improvement in the mass effect, and left to right midline shift (prior 8mm, now 5mm)  Postoperative findings  Plan:  · Continue frequent neurological checks  Incision with dissolvable monoacyl   · STAT CT head with any neurological decline including drop GCS of 2pts within 1 hr   · SBP <160mmHg  · Hold all antiplatelet and anticoagulation medications  · Patient does not take any home AC/AP at baseline  · Medical management and pain control per primary team  · Continue PT/OT as scheduled  · DVT PPX: SCDs, HSQ    Neurosurgery will follow-up outpatient as scheduled with repeat CT head prior to visit  Personally called billy Barahona with update of CT head and follow-up plan  · No further neurosurgical intervention anticipated at this time  Neurosurgery will see pt PRN during the remainder of this hospitalization  Pt will have follow up with neurosurgery for 2 week and 6 wk POVs with repeat CTH  Please call with any questions/concerns

## 2022-06-15 NOTE — PROGRESS NOTES
OT Treatment Note       06/15/22 0900   Pain Assessment   Pain Assessment Tool 0-10   Pain Score No Pain   Restrictions/Precautions   Precautions Bed/chair alarms; Fall Risk;Hard of hearing;Seizure;Supervision on toilet/commode   Weight Bearing Restrictions No   ROM Restrictions No   Braces or Orthoses   (abdominal binder OOB)   Lifestyle   Autonomy "This is no good," referring to RUE   Grooming   Able To Wash/Dry Hands   Findings c(s) in stance at sink to wash hands   Putting On/Taking Off Footwear   Type of Assistance Needed Supervision;Verbal cues   Physical Assistance Level No physical assistance   Comment Pt able to doff slipper socks, don socks and boots with encouragement and extended time using footstool to prop feet on  Pt initially stating "I can't do it," but with encouragement pt able to tie laces with increased time   Putting On/Taking Off Footwear CARE Score 4   Sit to Stand   Type of Assistance Needed Supervision   Physical Assistance Level No physical assistance   Comment without AD   Sit to Stand CARE Score 4   Bed-Chair Transfer   Type of Assistance Needed Supervision   Physical Assistance Level No physical assistance   Comment close SUP without AD   Chair/Bed-to-Chair Transfer CARE Score 4   [address] to manage clothing with close SUP  Pt not voiding or having BM though encouraging pt to simulate/practice BM hygiene  Pt stating, "I can't do it," requires encouragement to attempt   Toilet Transfer   Type of Assistance Needed Supervision   Physical Assistance Level No physical assistance   Comment close SUP using GB   Toilet Transfer CARE Score 4   Exercise Tools   Other Exercise Tool 1 Pt completing BUE therex for increased motor control, functional strength and endurance required for ADL tasks and functional transfers   Pt using 5# therabar and 3#db to complete chest press, shoulder flexion (0-90*), bicep curls, and lat pullbacks, 10x3 reps each with vc's and tactile cues for proper technique and slow controlled movement with fair carryover  Cognition   Overall Cognitive Status Impaired   Arousal/Participation Alert; Cooperative   Attention Attends with cues to redirect   Orientation Level Oriented X4   Memory Decreased recall of precautions   Following Commands Follows one step commands with increased time or repetition   Additional Activities   Additional Activities Comments Pt participating in standing balance activity functionally mobilizing in hallway and stepping over 2-4" obstacles, tapping cones each LE with unilateral UE support on wall bar  Pt able to retrieve cones from floor with CGA  No LOB noted during task  Pt completing visual scanning activity in hallway, functionally mobilizing and scanning for letters at various heights to spell his name Gertrudis Mckeon  Pt requiring extended time for task, often walking past letters and requiring mod vc's to scan entire environment to locate letters in order  Activity Tolerance   Activity Tolerance Patient tolerated treatment well   Assessment   Treatment Assessment Pt seen for 90 min OT session focusing on functional transfers/mobility, BUE strengthening, LB dressing, standing balance/tolerance and visual scanning activities  Pt tolerating session well  BP stable throughout with only abdominal binder donned and pt denying dizziness throughout  Pt requires encouragement to attempt ADL tasks independently, often reverts to "I can't do it," and "this is no good" referring to RUE  With extended time, pt can often complete tasks  OT to continue POC to focus on toileting (BM hygiene), functional transfers/mobility, RUE FMC, standing tolerance/balance, BUE strengthening, and family training to maximize functional (I) and safety prior to d/c  Prognosis Good   Problem List Decreased strength;Decreased endurance; Impaired balance;Decreased mobility; Decreased coordination;Decreased cognition; Impaired hearing   Barriers to Discharge Inaccessible home environment;Decreased caregiver support   Plan   Treatment/Interventions ADL retraining;Functional transfer training; Therapeutic exercise; Endurance training;Cognitive reorientation;Patient/family training;Equipment eval/education; Compensatory technique education   Recommendation   OT Discharge Recommendation Home with outpatient rehabilitation   OT Therapy Minutes   OT Time In 0900   OT Time Out 1030   OT Total Time (minutes) 90   OT Mode of treatment - Individual (minutes) 90   OT Mode of treatment - Concurrent (minutes) 0   OT Mode of treatment - Group (minutes) 0   OT Mode of treatment - Co-treat (minutes) 0   OT Mode of Treatment - Total time(minutes) 90 minutes   OT Cumulative Minutes 720   Therapy Time missed   Time missed?  No

## 2022-06-15 NOTE — PROGRESS NOTES
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  · Patient presented to the ED due to findings on CT scan of an acute on chronic SDH  · Very hard of hearing    Imaging:  · CT head wo 6/15/22: interval evolution and decrease in size of the left sided subdural collection/hematoma (prior 2 3cm, now 1 5cm) with associated improvement in the mass effect, and left to right midline shift (prior 8mm, now 5mm)  Postoperative findings  Plan:  · Continue frequent neurological checks  Incision with dissolvable monoacyl   · STAT CT head with any neurological decline including drop GCS of 2pts within 1 hr   · SBP <160mmHg  · Hold all antiplatelet and anticoagulation medications  · Patient does not take any home AC/AP at baseline  · Medical management and pain control per primary team  · Continue PT/OT as scheduled  · DVT PPX: SCDs, HSQ    Neurosurgery will follow-up outpatient as scheduled with repeat CT head prior to visit  Personally called daughter Morgan Garrett with update of CT head and follow-up plan  · No further neurosurgical intervention anticipated at this time  Neurosurgery will see pt PRN during the remainder of this hospitalization  Pt will have follow up with neurosurgery for 2 week and 6 wk POVs with repeat CTH  Please call with any questions/concerns  Subjective/Objective   Chief Complaint: postoperative follow-up    Subjective: Patient offers no complaints  He denies any headache, vision or speech changes  No new weakness or sensation changes   No CP/SOB    Objective: sitting up in chair  NAD  I/O       06/13 0701  06/14 0700 06/14 0701  06/15 0700 06/15 0701  06/16 0700    P  O  1270 960     Total Intake(mL/kg) 1270 (16 8) 960 (12 7)     Urine (mL/kg/hr) 1550 (0 9) 1300 (0 7)     Stool 0      Total Output 1550 1300     Net -280 -340            Unmeasured Urine Occurrence 1 x      Unmeasured Stool Occurrence 1 x            Invasive Devices  Report    None                 Physical Exam:  Vitals: Blood pressure 104/64, pulse 59, temperature 97 8 °F (36 6 °C), temperature source Oral, resp  rate 18, height 5' 11" (1 803 m), weight 75 8 kg (167 lb 1 7 oz), SpO2 94 %  ,Body mass index is 23 31 kg/m²  General appearance: alert, appears stated age, cooperative and no distress  Head: Normocephalic, without obvious abnormality, healing well  Eyes: EOMI, conjugate gaze  Neck: supple, symmetrical, trachea midline   Lungs: non labored breathing  Heart: regular heart rate  Neurologic:   Mental status: Alert, oriented x4, thought content appropriate  Cranial nerves: grossly intact (Cranial nerves II-XII)  Sensory: normal to LT x4  Motor: moving all extremities without focal weakness   Coordination:  no drift bilaterally    Lab Results:        Invalid input(s):  EOSPCT        Invalid input(s): LABALBU              No results found for: TROPONINT  ABG:No results found for: PHART, TYA4MXA, PO2ART, PTR3OTU, D1ZIHTAC, BEART, SOURCE    Imaging Studies: I have personally reviewed pertinent reports  and I have personally reviewed pertinent films in PACS    CT head wo    EKG, Pathology, and Other Studies: I have personally reviewed pertinent reports        VTE Pharmacologic Prophylaxis: Heparin    VTE Mechanical Prophylaxis: sequential compression device

## 2022-06-16 PROCEDURE — 99232 SBSQ HOSP IP/OBS MODERATE 35: CPT | Performed by: STUDENT IN AN ORGANIZED HEALTH CARE EDUCATION/TRAINING PROGRAM

## 2022-06-16 PROCEDURE — 99232 SBSQ HOSP IP/OBS MODERATE 35: CPT | Performed by: INTERNAL MEDICINE

## 2022-06-16 PROCEDURE — 97530 THERAPEUTIC ACTIVITIES: CPT

## 2022-06-16 PROCEDURE — 97110 THERAPEUTIC EXERCISES: CPT

## 2022-06-16 PROCEDURE — 97535 SELF CARE MNGMENT TRAINING: CPT

## 2022-06-16 PROCEDURE — 97112 NEUROMUSCULAR REEDUCATION: CPT

## 2022-06-16 RX ADMIN — ACETAMINOPHEN 650 MG: 325 TABLET, FILM COATED ORAL at 07:29

## 2022-06-16 RX ADMIN — DOCUSATE SODIUM 100 MG: 100 CAPSULE, LIQUID FILLED ORAL at 17:09

## 2022-06-16 RX ADMIN — HEPARIN SODIUM 5000 UNITS: 5000 INJECTION INTRAVENOUS; SUBCUTANEOUS at 13:58

## 2022-06-16 RX ADMIN — Medication 6 MG: at 23:46

## 2022-06-16 RX ADMIN — DOCUSATE SODIUM 100 MG: 100 CAPSULE, LIQUID FILLED ORAL at 08:19

## 2022-06-16 RX ADMIN — HEPARIN SODIUM 5000 UNITS: 5000 INJECTION INTRAVENOUS; SUBCUTANEOUS at 05:43

## 2022-06-16 RX ADMIN — SENNOSIDES 8.6 MG: 8.6 TABLET, FILM COATED ORAL at 23:47

## 2022-06-16 RX ADMIN — HEPARIN SODIUM 5000 UNITS: 5000 INJECTION INTRAVENOUS; SUBCUTANEOUS at 23:47

## 2022-06-16 RX ADMIN — PRAVASTATIN SODIUM 40 MG: 40 TABLET ORAL at 17:09

## 2022-06-16 RX ADMIN — CARBIDOPA AND LEVODOPA 2.5 MG: 50; 200 TABLET, EXTENDED RELEASE ORAL at 08:19

## 2022-06-16 NOTE — PLAN OF CARE
Problem: PAIN - ADULT  Goal: Verbalizes/displays adequate comfort level or baseline comfort level  Description: Interventions:  - Encourage patient to monitor pain and request assistance  - Assess pain using appropriate pain scale  - Administer analgesics based on type and severity of pain and evaluate response  - Implement non-pharmacological measures as appropriate and evaluate response  - Consider cultural and social influences on pain and pain management  - Notify physician/advanced practitioner if interventions unsuccessful or patient reports new pain  Outcome: Progressing     Problem: INFECTION - ADULT  Goal: Absence or prevention of progression during hospitalization  Description: INTERVENTIONS:  - Assess and monitor for signs and symptoms of infection  - Monitor lab/diagnostic results  - Monitor all insertion sites, i e  indwelling lines, tubes, and drains  - Monitor endotracheal if appropriate and nasal secretions for changes in amount and color  - [place] appropriate cooling/warming therapies per order  - Administer medications as ordered  - Instruct and encourage patient and family to use good hand hygiene technique  - Identify and instruct in appropriate isolation precautions for identified infection/condition  Outcome: Progressing  Goal: Absence of fever/infection during neutropenic period  Description: INTERVENTIONS:  - Monitor WBC    Outcome: Progressing     Problem: SAFETY ADULT  Goal: Patient will remain free of falls  Description: INTERVENTIONS:  - Educate patient/family on patient safety including physical limitations  - Instruct patient to call for assistance with activity   - Consult OT/PT to assist with strengthening/mobility   - Keep Call bell within reach  - Keep bed low and locked with side rails adjusted as appropriate  - Keep care items and personal belongings within reach  - Initiate and maintain comfort rounds  - Make Fall Risk Sign visible to staff  - Offer Toileting every 2 Hours, in advance of need  - Initiate/Maintain bed/chair alarm  - Obtain necessary fall risk management equipment: alarms  - Apply yellow socks and bracelet for high fall risk patients  - Consider moving patient to room near nurses station  Outcome: Progressing  Goal: Maintain or return to baseline ADL function  Description: INTERVENTIONS:  -  Assess patient's ability to carry out ADLs; assess patient's baseline for ADL function and identify physical deficits which impact ability to perform ADLs (bathing, care of mouth/teeth, toileting, grooming, dressing, etc )  - Assess/evaluate cause of self-care deficits   - Assess range of motion  - Assess patient's mobility; develop plan if impaired  - Assess patient's need for assistive devices and provide as appropriate  - Encourage maximum independence but intervene and supervise when necessary  - Involve family in performance of ADLs  - Assess for home care needs following discharge   - Consider OT consult to assist with ADL evaluation and planning for discharge  - Provide patient education as appropriate  Outcome: Progressing  Goal: Maintains/Returns to pre admission functional level  Description: INTERVENTIONS:  - Perform BMAT or MOVE assessment daily    - Set and communicate daily mobility goal to care team and patient/family/caregiver  - Collaborate with rehabilitation services on mobility goals if consulted  - Perform Range of Motion 3 times a day  - Reposition patient every 2 hours    - Dangle patient 3 times a day  - Stand patient 3 times a day  - Ambulate patient 3 times a day  - Out of bed to chair 3 times a day   - Out of bed for meals 3 times a day  - Out of bed for toileting  - Record patient progress and toleration of activity level   Outcome: Progressing     Problem: DISCHARGE PLANNING  Goal: Discharge to home or other facility with appropriate resources  Description: INTERVENTIONS:  - Identify barriers to discharge w/patient and caregiver  - Arrange for needed discharge resources and transportation as appropriate  - Identify discharge learning needs (meds, wound care, etc )  - Arrange for interpretive services to assist at discharge as needed  - Refer to Case Management Department for coordinating discharge planning if the patient needs post-hospital services based on physician/advanced practitioner order or complex needs related to functional status, cognitive ability, or social support system  Outcome: Progressing     Problem: Prexisting or High Potential for Compromised Skin Integrity  Goal: Skin integrity is maintained or improved  Description: INTERVENTIONS:  - Identify patients at risk for skin breakdown  - Assess and monitor skin integrity  - Assess and monitor nutrition and hydration status  - Monitor labs   - Assess for incontinence   - Turn and reposition patient  - Assist with mobility/ambulation  - Relieve pressure over bony prominences  - Avoid friction and shearing  - Provide appropriate hygiene as needed including keeping skin clean and dry  - Evaluate need for skin moisturizer/barrier cream  - Collaborate with interdisciplinary team   - Patient/family teaching  - Consider wound care consult   Outcome: Progressing     Problem: Nutrition/Hydration-ADULT  Goal: Nutrient/Hydration intake appropriate for improving, restoring or maintaining nutritional needs  Description: Monitor and assess patient's nutrition/hydration status for malnutrition  Collaborate with interdisciplinary team and initiate plan and interventions as ordered  Monitor patient's weight and dietary intake as ordered or per policy  Utilize nutrition screening tool and intervene as necessary  Determine patient's food preferences and provide high-protein, high-caloric foods as appropriate       INTERVENTIONS:  - Monitor oral intake, urinary output, labs, and treatment plans  - Assess nutrition and hydration status and recommend course of action  - Evaluate amount of meals eaten  - Assist patient with eating if necessary   - Allow adequate time for meals  - Recommend/ encourage appropriate diets, oral nutritional supplements, and vitamin/mineral supplements  - Order, calculate, and assess calorie counts as needed  - Recommend, monitor, and adjust tube feedings and TPN/PPN based on assessed needs  - Assess need for intravenous fluids  - Provide specific nutrition/hydration education as appropriate  - Include patient/family/caregiver in decisions related to nutrition  Outcome: Progressing

## 2022-06-16 NOTE — PROGRESS NOTES
06/16/22 1300   Pain Assessment   Pain Assessment Tool 0-10   Pain Score No Pain   Restrictions/Precautions   Precautions Bed/chair alarms;Cognitive; Fall Risk;Hard of hearing;Supervision on toilet/commode   Weight Bearing Restrictions No   ROM Restrictions No   Putting On/Taking Off Footwear   Comment seated performing cross leg tech to don/doff socks   Sit to Stand   Type of Assistance Needed Supervision   Physical Assistance Level No physical assistance   Comment CS no AD   Sit to Stand CARE Score 4   Bed-Chair Transfer   Type of Assistance Needed Supervision   Physical Assistance Level No physical assistance   Comment CS no AD   Chair/Bed-to-Chair Transfer CARE Score 4   Toileting Hygiene   Type of Assistance Needed Supervision   Physical Assistance Level No physical assistance   Comment CS in stance [place] hygiene/CM; pt attempted to have BM however unsuccessful   Isaiah Tomasi 82 Score 4   Toilet Transfer   Type of Assistance Needed Supervision   Physical Assistance Level No physical assistance   Comment CS to [facility] over top toilet   Toilet Transfer CARE Score 4   Cognition   Overall Cognitive Status Impaired   Arousal/Participation Alert; Cooperative   Attention Attends with cues to redirect   Orientation Level Oriented X4   Memory Decreased recall of precautions   Following Commands Follows one step commands with increased time or repetition   Additional Activities   Additional Activities Comments pt engages in various higher level balance tasks in order to improve overall righting reactions, balance and reduce risk of falls  pt performs func mobility down hallway while tossing ball, no LOB noted, able to retrieve and  ball from ground when dropped with overall CS  pt then takes larger physioball and bounces up/down hallway (like dribbling basket ball) with overall CS, no LOB noted     Activity Tolerance   Activity Tolerance Patient tolerated treatment well   Assessment   Treatment Assessment pt engages in brief 30 minute skilled OT session focusing on func mob w/o AD, higher level balance, toileting tasks  see above for full func details  pt continues to demo progress with overall OT goals with BP remaining stable during OT session-charted in vitals section; pt again completed session w/o TEDS or ab binder with no c/o dizziness  recommend continued skilled care to focus on ADL retraining, func transfers, standing ami/bal, strength/endurance, in order to decrease burden of care at d/c  Prognosis Good   Problem List Impaired balance;Decreased mobility; Decreased coordination;Decreased cognition; Impaired hearing   Barriers to Discharge Inaccessible home environment;Decreased caregiver support   Plan   Treatment/Interventions ADL retraining;Functional transfer training; Therapeutic exercise; Endurance training;Cognitive reorientation;Patient/family training;Equipment eval/education; Compensatory technique education   OT Therapy Minutes   OT Time In 1300   OT Time Out 1330   OT Total Time (minutes) 30   OT Mode of treatment - Individual (minutes) 30   OT Mode of treatment - Concurrent (minutes) 0   OT Mode of treatment - Group (minutes) 0   OT Mode of treatment - Co-treat (minutes) 0   OT Mode of Treatment - Total time(minutes) 30 minutes   OT Cumulative Minutes 810   Therapy Time missed   Time missed?  No

## 2022-06-16 NOTE — PROGRESS NOTES
Internal Medicine Progress Note  Patient: Eleazar Holbrook  Age/sex: 79 y o  male  Medical Record #: [id number]      ASSESSMENT/PLAN: (Interval History)  Eleazar Holbrook is seen and examined and management for following issues:    SDH s/p Lt craniotomy  · Monitor incisions  · Follow-up with Neurosurgery in 2 weeks  · S/p Keppra for 7 days for seizure prophylaxis  Hyperlipidemia  · Continue statin  · Low cholesterol diet  Urinary retention  · Was placed on flomax on acute side  · dc'd flomax 6/7 d/t orthostasis    Orthostasis  · Encourage fluids  · Off flomax as above  · No longer using TEDs or abdominal binder  · No further dizziness  · Will cont current midodrine    [place]  · Assistive device at bedside    DC planning: reteam    The above assessment and plan was reviewed and updated as determined by my evaluation of the patient on 6/16/2022  Labs: Invalid input(s): LABGLOM, CMP                Review of Scheduled Meds:  Current Facility-Administered Medications   Medication Dose Route Frequency Provider Last Rate    acetaminophen  650 mg Oral Q6H PRN Zeyad Mccarthy MD      bisacodyl  10 mg Rectal Daily PRN Zeyad Mccarthy MD      docusate sodium  100 mg Oral BID Sanjana Butcher PA-C      heparin (porcine)  5,000 Units Subcutaneous Q8H [facility] Zeyad Mccarthy MD      lidocaine   Topical BID PRN Zeyad Mccarthy MD      melatonin  6 mg Oral HS Zeyad Mccarthy MD      midodrine  2 5 mg Oral Daily With Lunch RONY Jones      midodrine  2 5 mg Oral Daily Sanjana Butcher PA-C      oxyCODONE  5 mg Oral Q4H PRN MD David Olmos oxyCODONE  2 5 mg Oral Q4H PRN Zeyad Mccarthy MD      polyethylene glycol  17 g Oral Daily PRN Zeyad Mccarthy MD      pravastatin  40 mg Oral Daily With Viri Lang MD      senna  1 tablet Oral HS Sanjana Butcher PA-C         Subjective/ HPI: Patient seen and examined   Patients overnight issues or events were reviewed with nursing or staff during rounds or morning huddle session  New or overnight issues include the following:     Pt seen in OT  He reports continued difficulty sleeping  PMR addressing insomnia  Pt states he would like to go home  He denies any other complaints  ROS:   A 10 point ROS was performed; negative except as noted above  Imaging:     CT head wo contrast   Final Result by Dallin Oliva DO (06/15 7691)      Interval evolution and decrease in size of the left-sided subdural collection/hematoma with associated improvement in the mass effect, and left to right midline shift, as described  No other significant interval change  Postsurgical changes and other findings as above  Clinical follow-up recommended              Workstation performed: [id number]             *Labs /Radiology studies Reviewed  *Medications  reviewed and reconciled as needed  *Please refer to order section for additional ordered labs studies  *Case discussed with primary attending during morning huddle case rounds    Physical Examination:  Vitals:   Vitals:    06/15/22 1345 06/15/22 1700 06/15/22 1916 06/16/22 0546   BP:  116/70 107/67 100/68   BP Location:  Right arm Right arm Left arm   Pulse: (!) 115 89 90 82   Resp:  18 20 18   Temp:  98 2 °F (36 8 °C) 98 5 °F (36 9 °C) 98 °F (36 7 °C)   TempSrc:  Oral Oral Oral   SpO2: 92% 96% 96% 95%   Weight:       Height:         GEN: No apparent distress, interactive; frail  NEURO: Alert and oriented x3  HEENT: Pupils are equal and reactive, EOMI, mucous membranes are moist, face symmetrical; extremely [place] assistive device in place  CV: S1 S2 regular, no MRG, no peripheral edema noted  RESP: Lungs are clear bilaterally, no wheezes, rales or rhonchi noted, on room air, respirations easy and non labored  GI: Flat, soft non tender, non distended; +BS x4  : Voiding without difficulty  MUSC: Moves all extremities  SKIN: pink, warm and dry, normal turgor, no rashes, lesions    The above physical exam was reviewed and updated as determined by my evaluation of the patient on 6/16/2022  Invasive Devices  Report    None                    VTE Pharmacologic Prophylaxis: Heparin  Code Status: Level 1 - Full Code  Current Length of Stay: 10 day(s)      Total time spent:  30 minutes with more than 50% spent counseling/coordinating care  Counseling includes discussion with patient re: progress  and discussion with patient of his/her current medical state/information  Coordination of patient's care was performed in conjunction with primary service  Time invested included review of patient's labs, vitals, and management of their comorbidities with continued monitoring  In addition, this patient was discussed with medical team including physician and advanced extenders  The care of the patient was extensively discussed and appropriate treatment plan was formulated unique for this patient  ** Please Note:  voice to text software may have been used in the creation of this document   Although proof errors in transcription or interpretation are a potential of such software**

## 2022-06-16 NOTE — PROGRESS NOTES
06/16/22 0900   Pain Assessment   Pain Assessment Tool 0-10   Pain Score No Pain   Restrictions/Precautions   Precautions Bed/chair alarms; Fall Risk;Cognitive;Supervision on toilet/commode;Hard of hearing   Weight Bearing Restrictions No   ROM Restrictions No   General   Change In Medical/Functional Status performed orthostatics without abdominal binder use; vitals doc in flowsheet, pt asymptomatic, 8mmHg drop in SBP, 13bpm increase in HR  Pt denies dizziness or pain throughout session  Cognition   Overall Cognitive Status Impaired   Arousal/Participation Alert; Cooperative   Attention Attends with cues to redirect   Memory Decreased recall of precautions   Following Commands Follows one step commands with increased time or repetition   Comments [place], used white board throughout session, and wife helped communicate   Subjective   Subjective Pt agreeable to PT, denies pain or dizziness  "my mind is good"   Sit to Stand   Type of Assistance Needed Supervision   Physical Assistance Level No physical assistance   Comment without AD   Sit to Stand CARE Score 4   Bed-Chair Transfer   Type of Assistance Needed Supervision   Physical Assistance Level No physical assistance   Comment without AD   Chair/Bed-to-Chair Transfer CARE Score 4   Car Transfer   Type of Assistance Needed Supervision;Verbal cues   Physical Assistance Level No physical assistance   Comment VC for safe/appropriate transfer with VC and demo for hand placement and to back into seat instead of climbing into car head first  Pt wife to hold door if he uses when transfering out of car   Car Transfer CARE Score 4   Walk 10 Feet   Type of Assistance Needed Supervision   Physical Assistance Level No physical assistance   Comment no AD   Walk 10 Feet CARE Score 4   Walk 50 Feet with Two Turns   Type of Assistance Needed Supervision   Physical Assistance Level No physical assistance   Comment no AD   Walk 50 Feet with Two Turns CARE Score 4   Walk 150 Feet Type of Assistance Needed Supervision   Physical Assistance Level No physical assistance   Comment no AD, CS   Walk 150 Feet CARE Score 4   Walking 10 Feet on Uneven Surfaces   Type of Assistance Needed Supervision   Physical Assistance Level No physical assistance   Comment no AD, floor mat, CS   Walking 10 Feet on Uneven Surfaces CARE Score 4   Ambulation   Does the patient walk? 2  Yes   Primary Mode of Locomotion Prior to Admission Walk   Distance Walked (feet) 300 ft  (200ft x6, 50ft x3, 15ft x 4)   Assist Device   (no AD)   Gait Pattern Inconsistant Maria Victoria; Slow Maria Victoria; Improper weight shift; Step through  (dec step length)   Limitations Noted In Heel Strike;Balance; Coordination;Posture;Speed   Provided Assistance with: Direction   Walk Assist Level Supervision   Findings Pt able to ambulate to 9th floor with seated rest break at elevator  Pt denies dizziness throughout   Wheel 50 Feet with Two Turns   Reason if not Attempted Activity not applicable   Wheel 50 Feet with Two Turns CARE Score 9   Wheel 150 Feet   Reason if not Attempted Activity not applicable   Wheel 074 Feet CARE Score 9   Wheelchair mobility   Does the patient use a wheelchair? 0   No   Curb or Single Stair   Style negotiated Single stair   Type of Assistance Needed Supervision   Physical Assistance Level No physical assistance   Comment R HR, FF   1 Step (Curb) CARE Score 4   4 Steps   Type of Assistance Needed Supervision   Physical Assistance Level No physical assistance   Comment R HR, FF, non reciprocal   4 Steps CARE Score 4   12 Steps   Type of Assistance Needed Supervision   Physical Assistance Level No physical assistance   Comment R HR, FF, non reciprocal   12 Steps CARE Score 4   Picking Up Object   Type of Assistance Needed Supervision   Physical Assistance Level No physical assistance   Comment no AD  pen   Picking Up Object CARE Score 4   Toilet Transfer   Type of Assistance Needed Supervision   Physical Assistance Level No physical assistance   Comment CS using BSC over toilet; VC to pull up pants and to flush toilet; independent for personal hygiene   Toilet Transfer CARE Score 4   Therapeutic Interventions   Balance alt toe taps to 8" step with CGA 2x10 reps B/L   Neuromuscular Re-Education Fwd walking for speed with arm swing 200ft x 2; Backwards walking 50ft x3, VC for bigger steps, arm swing, and to keep eyes straight ahead, pt cont to have dec upper and lower body coordination with movements  Pt ambulated 15ft with visual colored dots on floor to initiate increased weight shift and increased step length with ambulation, pt completed 15ft x 8 reps  Equipment Use   NuStep L5 10 min all 4 extremities, no pain aggravation   Other Comments   Comments Family training with wife Floresita Swanson to supervision full flight, ambulation, and car transfer  Wife states she can do it and if feels needs assistance would call son  Wife agreeable to OPPT  Reports that he will have first floor set up at d/c  Will continue family training tomorrow  Assessment   Treatment Assessment Pt was seen for 90 min skilled PT session focusing on family training, gait training and LE strengthening  Pt BP stable without abdominal binder and no symptoms of dizziness throughout session  Pt able to complete stairs with RHR at a supervision level assist and wife, Floresita Swanson to have family to provide supervision if she is uncomfortable  He was able to compelte car transfer with supervision and max VC to complete safe/appropriate transfer, wife educated to hold door if pt tends to use to stand up  Wife agreeable  He cont to ambulate with supervision level assist, increased consistency with looking straight ahead, requiring VC only 25% of the time  Able to complete neuromusc brandon focusing on increasing step length, improving weight shift, and balance  Pt and wife educated on d/c plan for 6/23 to which they are agreeable  Cont per POC and progress as able  Family/Caregiver Present yes   PT Family training done with: wife Yarely Norwood   Problem List Impaired balance;Decreased mobility; Decreased coordination;Decreased cognition; Impaired hearing   PT Barriers   Physical Impairment Impaired balance;Decreased mobility; Decreased coordination;Decreased cognition; Impaired hearing   Functional Limitation Car transfers;Stair negotiation;Standing;Transfers; Walking;Ramp negotiation   Plan   Treatment/Interventions Functional transfer training;ADL retraining;LE strengthening/ROM; Therapeutic exercise; Endurance training;Patient/family training;Cognitive reorientation; Bed mobility;Gait training; Compensatory technique education;Spoke to nursing;Spoke to MD;Spoke to case management;Spoke to advanced practitioner   Progress Progressing toward goals   Recommendation   PT Discharge Recommendation Home with outpatient rehabilitation   PT - OK to Discharge No   PT Therapy Minutes   PT Time In 0900   PT Time Out 1030   PT Total Time (minutes) 90   PT Mode of treatment - Individual (minutes) 90   PT Mode of treatment - Concurrent (minutes) 0   PT Mode of treatment - Group (minutes) 0   PT Mode of treatment - Co-treat (minutes) 0   PT Mode of Treatment - Total time(minutes) 90 minutes   PT Cumulative Minutes 805   Therapy Time missed   Time missed?  No

## 2022-06-16 NOTE — PROGRESS NOTES
PM&R PROGRESS NOTE:  Sammy Price 79 y o  male MRN: [id number]  Unit/Bed#: -54 Encounter: [id number]        Rehabilitation Diagnosis: Impairment of mobility, safety and Activities of Daily Living (ADLs) due to Brain Dysfunction:  02 22  Traumatic, Closed Injury    HPI: Patient is a 80 yo male with h/o fall and L SDH which was being monitored by neurosx with serial CTH however patient's recent OP CTH showed expansion of L SDH with increasing midline shift therefore patient underwent L craniotomy for SDH evacuation on 5/28 by Dr Darylene Doss  A L MMA embolization was attempted as well however was aborted due to anatomic variation that would not allow for the procedure  SUBJECTIVE: Patient seen and examined in room  No acute issues overnight  Continues to make good progress  Discussed potentially discharging 2 days earlier because of it and will talk to the team and his wife tomorrow  He denies fever, chills, nausea, emesis, cough, shortness of breath, diarrhea, constipation  ASSESSMENT: Stable, progressing      PLAN:    Rehabilitation   Functional deficits:  Self care, mobility   Continue current rehabilitation plan of care to maximize function       Functional update:   Physical Therapy Occupational Therapy Speech Therapy   Weight Bearing Status: Full Weight Bearing  Transfers: Incidental Touching, Supervision  Bed Mobility: Supervision  Amulation Distance (ft): 150 feet  Ambulation: Incidental Touching  Assistive Device for Ambulation:  (none)  Number of Stairs: 12 (FF)  Assistive Device for Stairs: Right Hand Rail  Stair Assistance: Minimal Assistance  Discharge Recommendations: Home with:  [address] [facility] Braulio Amalia with[de-identified] Family Support, 24 Hour Assisteance   Eating: Independent  Grooming: Minimal Assistance  Bathing: Minimal Assistance  Bathing: Minimal Assistance  Upper Body Dressing: Minimal Assistance  Lower Body Dressing: Minimal Assistance  Toileting: Minimal Assistance  Toilet Transfer: Minimal Assistance  Cognition: Exceptions to WNL  Cognition: Decreased Memory, Decreased Safety  Orientation: Person, Place, Time, Situation                  Estimated Discharge: 6/23      Pain   PRN acetaminophen, Oxycodone 2 5-5mg Q4H PRN    DVT prophylaxis   heparin    Bladder plan   Continent    Bowel plan   Continent, Last BM 6/14      Assessment/Plan:     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: was being monitored with serial CTH however recent OP CTH showed expansion of L SDH with increased midline shift therefore patient underwent L craniotomy for SDH evacuation on 5/28 by Dr Scott Lopez, a L MMA embolization was attempted but ultimately aborted due to anatomic variation that would not allow for procedure, s/p keppra course for ppx, goal SBP<160 per neurosx, holding AP/AC/NSAIDs per neurosx (not on AC/AP agents at home PTA) but was cleared by neurosx in acute care for HSQ for DVT ppx and patient was started on HSQ in acute care on 6/3; OP FU with neurosx scheduled for 6/15 and 7/7 with FU CTH 2-3 days PTA per neurosx protocol ([facility] x 2 already e-prescribed in EMR by neurosx team)- [facility] and neurosurgery follow up completed 6/14 and interval decrease in hematoma size       Dyslipidemia: therapeutic substitution for home zocor 20 mg qpm      Urinary retention: per d/w spouse appears to have symptoms of enlarged prostate at home, started on flomax in acute care however IM dc'd due to low BPs, checking PVRs (last 2 PVRs were 170 & 0)      Thrombocytosis: mild at 432 (), likely reactive     h/o rt caudate lacunar infarct: incidental finding on imaging in acute care, therapeutic substitution for home zocor 20 mg qpm, not on AP/AC at home however not currently a candidate for AP/AC due to 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 per neurosx recs     Hypotension: Midodrine decreased to 2 5mg daily, discontinued teds and abdominal binder and will monitor over the next couple days and plan to attempt trial off all midodrine     Insomnia- Currently on 6mg of melatonin, missed home     Incidental findings:     1) abnormalities on echocardiogram including but not limited to grade 1 DD & abnormal septal motion     2) abnormalities on EKG including but not limited to wide QRS/RBBB     3) mild left maxillary and left ethmoid mucosal thickening       No new labs to review      ROS:  A ten point review of systems was completed on 06/16/22 and pertinent positives are listed in subjective section  All other systems reviewed were negative  OBJECTIVE:   /68 (BP Location: Left arm)   Pulse 82   Temp 98 °F (36 7 °C) (Oral)   Resp 18   Ht 5' 11" (1 803 m)   Wt 75 8 kg (167 lb 1 7 oz)   SpO2 95%   BMI 23 31 kg/m²     Physical Exam  Constitutional:       General: He is not in acute distress  Appearance: Normal appearance  HENT:      Head: Normocephalic and atraumatic  Right Ear: External ear normal       Left Ear: External ear normal       Ears:      Comments: Can not hear even with amplifier     Nose: Nose normal  No rhinorrhea  Mouth/Throat:      Mouth: Mucous membranes are moist       Pharynx: Oropharynx is clear  Eyes:      General: No scleral icterus  Extraocular Movements: Extraocular movements intact  Cardiovascular:      Rate and Rhythm: Normal rate and regular rhythm  Pulses: Normal pulses  Heart sounds: Normal heart sounds  Pulmonary:      Effort: Pulmonary effort is normal  No respiratory distress  Breath sounds: Normal breath sounds  No wheezing or rales  Abdominal:      General: Bowel sounds are normal  There is no distension  Palpations: Abdomen is soft  Musculoskeletal:      Cervical back: Normal range of motion  No tenderness  Right lower leg: No edema  Left lower leg: No edema  Skin:     General: Skin is warm and dry  Neurological:      Mental Status: He is alert and oriented to person, place, and time  Sensory: No sensory deficit  Motor: No weakness        Coordination: Coordination abnormal    Psychiatric:         Mood and Affect: Mood normal          Behavior: Behavior normal           Lab Results   Component Value Date    WBC 5 16 06/06/2022    HGB 13 3 06/06/2022    HCT 38 6 06/06/2022    MCV 92 06/06/2022     (H) 06/06/2022     Lab Results   Component Value Date    SODIUM 138 06/06/2022    K 4 0 06/06/2022     06/06/2022    CO2 29 06/06/2022    BUN 15 06/06/2022    CREATININE 0 70 06/06/2022    GLUC 93 06/06/2022    CALCIUM 9 3 06/06/2022     Lab Results   Component Value Date    INR 1 02 06/02/2022    INR 1 10 05/29/2022    INR 1 04 05/28/2022    PROTIME 13 0 06/02/2022    PROTIME 13 7 05/29/2022    PROTIME 13 2 05/28/2022           Current Facility-Administered Medications:     acetaminophen (TYLENOL) tablet 650 mg, 650 mg, Oral, Q6H PRN, Junior Fleming MD, 650 mg at 06/16/22 5094    bisacodyl (DULCOLAX) rectal suppository 10 mg, 10 mg, Rectal, Daily PRN, Junior Fleming MD    docusate sodium (COLACE) capsule 100 mg, 100 mg, Oral, BID, Sanjana Butcher PA-C, 100 mg at 06/16/22 0819    heparin (porcine) subcutaneous injection 5,000 Units, 5,000 Units, Subcutaneous, Q8H [address], Junior Fleming MD, 5,000 Units at 06/16/22 0543    lidocaine (XYLOCAINE) 2 % topical gel, , Topical, BID PRN, Junior Fleming MD    melatonin tablet 6 mg, 6 mg, Oral, HS, Junior Fleming MD, 6 mg at 06/15/22 2112    midodrine (PROAMATINE) tablet 2 5 mg, 2 5 mg, Oral, Daily With Lunch, RONY Jones, 2 5 mg at 06/14/22 1319    midodrine (PROAMATINE) tablet 2 5 mg, 2 5 mg, Oral, Daily, Sanjana Butcher PA-C, 2 5 mg at 06/16/22 6930    oxyCODONE (ROXICODONE) IR tablet 5 mg, 5 mg, Oral, Q4H PRN, 5 mg at 06/09/22 0654 **OR** oxyCODONE (ROXICODONE) IR tablet 2 5 mg, 2 5 mg, Oral, Q4H PRN, Junior Fleming MD    polyethylene glycol (MIRALAX) packet 17 g, 17 g, Oral, Daily PRN, Junior Fleming MD    pravastatin (PRAVACHOL) tablet 40 mg, 40 mg, Oral, Daily With Brayan Da Silva MD, 40 mg at 06/15/22 1536    senna (SENOKOT) tablet 8 6 mg, 1 tablet, Oral, HS, Sanjana Butcher PA-C, 8 6 mg at 06/15/22 2112    Past Medical History:   Diagnosis Date    Arthritis     Encounter for general adult medical examination without abnormal findings 3/20/2019    Hyperlipidemia        Patient Active Problem List    Diagnosis Date Noted    Constipation 06/02/2022    Urinary retention 06/02/2022    SDH [facility]subdural hematoma) ([facility] [address] ) 05/27/2022    Primary osteoarthritis of left knee 05/17/2022    Hygroma 04/26/2022    Right hand pain     Carpal tunnel syndrome on right     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[facility] [address] ) 09/02/2021    Overweight (BMI 25 0-29 9) 01/27/2021    Negative depression screening 09/26/2019    Medicare annual wellness visit, subsequent 03/20/2019    Hypercholesterolemia 07/12/2018    Borderline hypertension 07/12/2018    Hearing loss 04/08/2009          Juliet Santos DO  Physical Medicine and Benita Castañeda    Total time spent:  30 minutes, with more than 50% spent counseling/coordinating care  Counseling includes discussion with patient re: progress in therapies, functional issues observed by therapy staff, and discussion with patient his/her current medical state/wellbeing  Coordination of patient's care was performed in conjunction with Internal Medicine service to monitor patient's labs, vitals, and management of their comorbidities

## 2022-06-16 NOTE — PROGRESS NOTES
06/16/22 1500   Clinical Encounter Type   Visited With Patient   Routine Visit Follow-up   Sacramental Encounters   Communion Given Indicator Yes   Sacrament Other Other (Comment)  (Tulio Evans)

## 2022-06-17 PROCEDURE — 99232 SBSQ HOSP IP/OBS MODERATE 35: CPT | Performed by: INTERNAL MEDICINE

## 2022-06-17 PROCEDURE — 97530 THERAPEUTIC ACTIVITIES: CPT

## 2022-06-17 PROCEDURE — 97110 THERAPEUTIC EXERCISES: CPT

## 2022-06-17 PROCEDURE — 99232 SBSQ HOSP IP/OBS MODERATE 35: CPT | Performed by: STUDENT IN AN ORGANIZED HEALTH CARE EDUCATION/TRAINING PROGRAM

## 2022-06-17 PROCEDURE — 97112 NEUROMUSCULAR REEDUCATION: CPT

## 2022-06-17 RX ADMIN — DOCUSATE SODIUM 100 MG: 100 CAPSULE, LIQUID FILLED ORAL at 08:03

## 2022-06-17 RX ADMIN — SENNOSIDES 8.6 MG: 8.6 TABLET, FILM COATED ORAL at 20:59

## 2022-06-17 RX ADMIN — HEPARIN SODIUM 5000 UNITS: 5000 INJECTION INTRAVENOUS; SUBCUTANEOUS at 05:19

## 2022-06-17 RX ADMIN — DOCUSATE SODIUM 100 MG: 100 CAPSULE, LIQUID FILLED ORAL at 17:32

## 2022-06-17 RX ADMIN — POLYETHYLENE GLYCOL 3350 17 G: 17 POWDER, FOR SOLUTION ORAL at 17:32

## 2022-06-17 RX ADMIN — HEPARIN SODIUM 5000 UNITS: 5000 INJECTION INTRAVENOUS; SUBCUTANEOUS at 20:59

## 2022-06-17 RX ADMIN — Medication 6 MG: at 20:59

## 2022-06-17 RX ADMIN — HEPARIN SODIUM 5000 UNITS: 5000 INJECTION INTRAVENOUS; SUBCUTANEOUS at 14:33

## 2022-06-17 RX ADMIN — PRAVASTATIN SODIUM 40 MG: 40 TABLET ORAL at 17:32

## 2022-06-17 RX ADMIN — CARBIDOPA AND LEVODOPA 2.5 MG: 50; 200 TABLET, EXTENDED RELEASE ORAL at 08:03

## 2022-06-17 NOTE — PLAN OF CARE
Problem: PAIN - ADULT  Goal: Verbalizes/displays adequate comfort level or baseline comfort level  Description: Interventions:  - Encourage patient to monitor pain and request assistance  - Assess pain using appropriate pain scale  - Administer analgesics based on type and severity of pain and evaluate response  - Implement non-pharmacological measures as appropriate and evaluate response  - Consider cultural and social influences on pain and pain management  - Notify physician/advanced practitioner if interventions unsuccessful or patient reports new pain  Outcome: Progressing     Problem: INFECTION - ADULT  Goal: Absence or prevention of progression during hospitalization  Description: INTERVENTIONS:  - Assess and monitor for signs and symptoms of infection  - Monitor lab/diagnostic results  - Monitor all insertion sites, i e  indwelling lines, tubes, and drains  - Monitor endotracheal if appropriate and nasal secretions for changes in amount and color  - [place] appropriate cooling/warming therapies per order  - Administer medications as ordered  - Instruct and encourage patient and family to use good hand hygiene technique  - Identify and instruct in appropriate isolation precautions for identified infection/condition  Outcome: Progressing  Goal: Absence of fever/infection during neutropenic period  Description: INTERVENTIONS:  - Monitor WBC    Outcome: Progressing  Goal: Maintain or return to baseline ADL function  Description: INTERVENTIONS:  -  Assess patient's ability to carry out ADLs; assess patient's baseline for ADL function and identify physical deficits which impact ability to perform ADLs (bathing, care of mouth/teeth, toileting, grooming, dressing, etc )  - Assess/evaluate cause of self-care deficits   - Assess range of motion  - Assess patient's mobility; develop plan if impaired  - Assess patient's need for assistive devices and provide as appropriate  - Encourage maximum independence but intervene and supervise when necessary  - Involve family in performance of ADLs  - Assess for home care needs following discharge   - Consider OT consult to assist with ADL evaluation and planning for discharge  - Provide patient education as appropriate  Outcome: Progressing  Goal: Maintains/Returns to pre admission functional level  Description: INTERVENTIONS:  - Perform BMAT or MOVE assessment daily    - Set and communicate daily mobility goal to care team and patient/family/caregiver  - Collaborate with rehabilitation services on mobility goals if consulted  - Perform Range of Motion 3 times a day  - Reposition patient every 2 hours    - Dangle patient 3 times a day  - Stand patient 3 times a day  - Ambulate patient 3 times a day  - Out of bed to chair 3 times a day   - Out of bed for meals 3 times a day  - Out of bed for toileting  - Record patient progress and toleration of activity level   Outcome: Progressing     Problem: DISCHARGE PLANNING  Goal: Discharge to home or other facility with appropriate resources  Description: INTERVENTIONS:  - Identify barriers to discharge w/patient and caregiver  - Arrange for needed discharge resources and transportation as appropriate  - Identify discharge learning needs (meds, wound care, etc )  - Arrange for interpretive services to assist at discharge as needed  - Refer to Case Management Department for coordinating discharge planning if the patient needs post-hospital services based on physician/advanced practitioner order or complex needs related to functional status, cognitive ability, or social support system  Outcome: Progressing     Problem: Prexisting or High Potential for Compromised Skin Integrity  Goal: Skin integrity is maintained or improved  Description: INTERVENTIONS:  - Identify patients at risk for skin breakdown  - Assess and monitor skin integrity  - Assess and monitor nutrition and hydration status  - Monitor labs   - Assess for incontinence   - Turn and reposition patient  - Assist with mobility/ambulation  - Relieve pressure over bony prominences  - Avoid friction and shearing  - Provide appropriate hygiene as needed including keeping skin clean and dry  - Evaluate need for skin moisturizer/barrier cream  - Collaborate with interdisciplinary team   - Patient/family teaching  - Consider wound care consult   Outcome: Progressing     Problem: Potential for Falls  Goal: Patient will remain free of falls  Description: INTERVENTIONS:  - Educate patient/family on patient safety including physical limitations  - Instruct patient to call for assistance with activity   - Consult OT/PT to assist with strengthening/mobility   - Keep Call bell within reach  - Keep bed low and locked with side rails adjusted as appropriate  - Keep care items and personal belongings within reach  - Initiate and maintain comfort rounds  - Make Fall Risk Sign visible to staff  - Offer Toileting every 2 Hours, in advance of need  - Initiate/Maintain bed/chair alarm  - Obtain necessary fall risk management equipment: alarms  - Apply yellow socks and bracelet for high fall risk patients  - Consider moving patient to room near nurses station  Outcome: Progressing     Problem: Nutrition/Hydration-ADULT  Goal: Nutrient/Hydration intake appropriate for improving, restoring or maintaining nutritional needs  Description: Monitor and assess patient's nutrition/hydration status for malnutrition  Collaborate with interdisciplinary team and initiate plan and interventions as ordered  Monitor patient's weight and dietary intake as ordered or per policy  Utilize nutrition screening tool and intervene as necessary  Determine patient's food preferences and provide high-protein, high-caloric foods as appropriate       INTERVENTIONS:  - Monitor oral intake, urinary output, labs, and treatment plans  - Assess nutrition and hydration status and recommend course of action  - Evaluate amount of meals eaten  - Assist patient with eating if necessary   - Allow adequate time for meals  - Recommend/ encourage appropriate diets, oral nutritional supplements, and vitamin/mineral supplements  - Order, calculate, and assess calorie counts as needed  - Recommend, monitor, and adjust tube feedings and TPN/PPN based on assessed needs  - Assess need for intravenous fluids  - Provide specific nutrition/hydration education as appropriate  - Include patient/family/caregiver in decisions related to nutrition  Outcome: Progressing

## 2022-06-17 NOTE — PROGRESS NOTES
Internal Medicine Progress Note  Patient: Leanna Lopez  Age/sex: 79 y o  male  Medical Record #: [id number]      ASSESSMENT/PLAN: (Interval History)  Leanna Lopez is seen and examined and management for following issues:    SDH s/p Lt craniotomy  · Monitor incisions  · Follow-up with Neurosurgery in 2 weeks  · S/p Keppra for 7 days for seizure prophylaxis  Hyperlipidemia  · Continue statin  · Low cholesterol diet  Urinary retention  · Was placed on flomax on acute side  · dc'd flomax 6/7 d/t orthostasis    Orthostasis  · Encourage fluids  · Off flomax as above  · No longer using TEDs or abdominal binder  · No further dizziness  · Will cont current midodrine    [place]  · Assistive device at bedside    DC planning: reteam    The above assessment and plan was reviewed and updated as determined by my evaluation of the patient on 6/17/2022  Labs: Invalid input(s): LABGLOM, CMP                Review of Scheduled Meds:  Current Facility-Administered Medications   Medication Dose Route Frequency Provider Last Rate    acetaminophen  650 mg Oral Q6H PRN Jose Putnam MD      bisacodyl  10 mg Rectal Daily PRN Jose Putnam MD      docusate sodium  100 mg Oral BID Sanjana Butcher PA-C      heparin (porcine)  5,000 Units Subcutaneous Q8H [address] Jose Putnam MD      lidocaine   Topical BID PRN Jose Putnam MD      melatonin  6 mg Oral HS Jose Putnam MD      midodrine  2 5 mg Oral Daily With Lunch RONY Jones      midodrine  2 5 mg Oral Daily Sanjana Butcher PA-C      oxyCODONE  5 mg Oral Q4H PRN Jose Putnam MD      Or   Jazmyne Ok oxyCODONE  2 5 mg Oral Q4H PRN Jose Putnam MD      polyethylene glycol  17 g Oral Daily PRN Jose Putnam MD      pravastatin  40 mg Oral Daily With Jacqueline Taylor MD      senna  1 tablet Oral HS Sanjana Butcher PA-C         Subjective/ HPI: Patient seen and examined   Patients overnight issues or events were reviewed with nursing or staff during rounds or morning huddle session  New or overnight issues include the following:     Pt seen in his room  No complaints of dizziness just wants to go home    ROS:   A 10 point ROS was performed; negative except as noted above  Imaging:     CT head wo contrast   Final Result by Shelby Ha DO (06/15 7674)      Interval evolution and decrease in size of the left-sided subdural collection/hematoma with associated improvement in the mass effect, and left to right midline shift, as described  No other significant interval change  Postsurgical changes and other findings as above  Clinical follow-up recommended  Workstation performed: [id number]             *Labs /Radiology studies Reviewed  *Medications  reviewed and reconciled as needed  *Please refer to order section for additional ordered labs studies  *Case discussed with primary attending during morning huddle case rounds    Physical Examination:  Vitals:   Vitals:    06/16/22 2054 06/17/22 0649 06/17/22 0826 06/17/22 0829   BP: 120/73 106/72 117/71 94/68   BP Location: Left arm Left arm Right arm Right arm   Pulse: 85 81 100 (!) 108   Resp: 20 18     Temp: 98 8 °F (37 1 °C) 98 3 °F (36 8 °C)     TempSrc: Oral Oral     SpO2: 97% 95%     Weight:       Height:         GEN: No apparent distress, interactive  NEURO: Alert and oriented x3  HEENT: Pupils are equal and reactive, EOMI, mucous membranes are moist, face symmetrical; extremely [place]  CV: S1 S2 regular, no MRG, no peripheral edema noted  RESP: Lungs are clear bilaterally, no wheezes, rales or rhonchi noted, on room air, respirations easy and non labored  GI: Flat, soft non tender, non distended; +BS x4  : Voiding without difficulty  MUSC: Moves all extremities  SKIN: pale, poor turgor, no evidence of breakdown; incision intact        The above physical exam was reviewed and updated as determined by my evaluation of the patient on 6/17/2022      Invasive Devices  Report None                    VTE Pharmacologic Prophylaxis: Heparin  Code Status: Level 1 - Full Code  Current Length of Stay: 11 day(s)      Total time spent:  30 minutes with more than 50% spent counseling/coordinating care  Counseling includes discussion with patient re: progress  and discussion with patient of his/her current medical state/information  Coordination of patient's care was performed in conjunction with primary service  Time invested included review of patient's labs, vitals, and management of their comorbidities with continued monitoring  In addition, this patient was discussed with medical team including physician and advanced extenders  The care of the patient was extensively discussed and appropriate treatment plan was formulated unique for this patient  ** Please Note:  voice to text software may have been used in the creation of this document   Although proof errors in transcription or interpretation are a potential of such software**

## 2022-06-17 NOTE — PROGRESS NOTES
06/17/22 0740   Pain Assessment   Pain Assessment Tool 0-10   Pain Score No Pain   Restrictions/Precautions   Precautions Bed/chair alarms;Supervision on toilet/commode;Seizure;Cognitive; Fall Risk;Hard of hearing   Weight Bearing Restrictions No   ROM Restrictions No   General   Change In Medical/Functional Status Performed orthostatic vitals x2 doc in vitals section  No abdominal binder or TEDs; Pt did have drop in SPB, adn increase in HR however asymptomatic throughout session  Cognition   Overall Cognitive Status Impaired   Arousal/Participation Alert; Cooperative   Attention Attends with cues to redirect   Orientation Level Oriented X4   Memory Decreased recall of precautions   Following Commands Follows one step commands with increased time or repetition   Comments [place], used white board for communication   Subjective   Subjective Pt agreeable to PT  "this shoe horn sucks"   Sit to Stand   Type of Assistance Needed Supervision   Physical Assistance Level No physical assistance   Comment DS no AD   Sit to Stand CARE Score 4   Bed-Chair Transfer   Type of Assistance Needed Supervision   Physical Assistance Level No physical assistance   Comment DS no AD   Chair/Bed-to-Chair Transfer CARE Score 4   Walk 10 Feet   Type of Assistance Needed Supervision   Physical Assistance Level No physical assistance   Comment no AD DS   Walk 10 Feet CARE Score 4   Walk 50 Feet with Two Turns   Type of Assistance Needed Supervision   Physical Assistance Level No physical assistance   Comment no AD CS   Walk 50 Feet with Two Turns CARE Score 4   Walk 150 Feet   Type of Assistance Needed Supervision   Physical Assistance Level No physical assistance   Comment no AD CS   Walk 150 Feet CARE Score 4   Walking 10 Feet on Uneven Surfaces   Type of Assistance Needed Supervision   Physical Assistance Level No physical assistance   Comment no AD ramp, CS   Walking 10 Feet on Uneven Surfaces CARE Score 4   Ambulation   Does the patient walk? 2  Yes   Primary Mode of Locomotion Prior to Admission Walk   Distance Walked (feet) 1000 ft  (200ft x4)   Assist Device   (no AD)   Gait Pattern Inconsistant [place]; Slow [place]; Improper weight shift; Step through  (dec step length)   Limitations Noted In Balance; Heel Strike   Provided Assistance with: Direction   Walk Assist Level Supervision   Findings Pt able to ambulate downstairs with seated rest breaks in lobby   Wheel 50 Feet with Two Turns   Reason if not Attempted Activity not applicable   Wheel 50 Feet with Two Turns CARE Score 9   Wheel 150 Feet   Reason if not Attempted Activity not applicable   Wheel 811 Feet CARE Score 9   Wheelchair mobility   Does the patient use a wheelchair? 0  No   Curb or Single Stair   Style negotiated Curb   Type of Assistance Needed Supervision   Physical Assistance Level No physical assistance   Comment curb step in parking lot, no VC required, CS   1 Step (Curb) CARE Score 4   4 Steps   Type of Assistance Needed Supervision   Physical Assistance Level No physical assistance   Comment R HR FF, non reciprocal pattern; CS   4 Steps CARE Score 4   12 Steps   Type of Assistance Needed Supervision   Physical Assistance Level No physical assistance   Comment R HR FF, non reciprocal pattern; CS   12 Steps CARE Score 4   Picking Up Object   Type of Assistance Needed Supervision   Physical Assistance Level No physical assistance   Comment no AD  pen; CS   Picking Up Object CARE Score 4   Toilet Transfer   Type of Assistance Needed Supervision   Physical Assistance Level No physical assistance   Comment DS, "pointing" cue to flush toilet and wash hands   Toilet Transfer CARE Score 4   Therapeutic Interventions   Neuromuscular Re-Education Multi-task training incoorporating manual and cognitive tasks (carrying cup, finding room numbers) 200ft x4   Other Comments   Comments 5xSTS 22 45 sec from chair   10mWT average self-selected 0 79m/sec; 10mWT fast-velocity 0 92m/sec- high risk for falls   Assessment   Treatment Assessment Pt completed 90 min skiled PT session focusing on multi-task training, emphasis on visual scanning, and increased independence with fucntional mobility with overall Supervsion level assist  Vitals assessed without abdominal binder/TEDs, HR high  RN made aware and administered morning medications  Pt required DS for toileting and required VC to flush/wash hands  Pt able to ambulate in hallway and outside with noted R trunk lean and occasional deviation to the R when walking through tight spaces, and able to self correct without VCs  No major LOB noted  Pt cont to require VC and direction in unfamiliar surroundings  His Per outcome measures, pt remains a high risk for falls, and would benefit from cont skilled PT to improve overall balance, LE strength, multi-tasking and increased functional mobility independence  Planned discharge for June 21st with Supervision level assist  Pt made aware and agreeable  Family/Caregiver Present no   Problem List Decreased strength; Impaired balance;Decreased coordination;Decreased mobility; Impaired hearing   Barriers to Discharge Inaccessible home environment;Decreased caregiver support   PT Barriers   Physical Impairment Impaired balance;Decreased mobility; Decreased coordination; Impaired hearing;Decreased strength   Functional Limitation Car transfers; Ramp negotiation;Stair negotiation;Standing;Transfers; Walking   Plan   Treatment/Interventions ADL retraining;LE strengthening/ROM; Therapeutic exercise; Endurance training;Patient/family training;Equipment eval/education; Bed mobility;Gait training;Spoke to MD;Spoke to nursing;Spoke to case management;Spoke to advanced practitioner   Progress Progressing toward goals   Recommendation   PT Discharge Recommendation Home with outpatient rehabilitation   Equipment Recommended    PT - OK to Discharge No   PT Therapy Minutes   PT Time In 0740   PT Time Out 0910   PT Total Time (minutes) 90   PT Mode of treatment - Individual (minutes) 80   PT Mode of treatment - Concurrent (minutes) 10   PT Mode of treatment - Group (minutes) 0   PT Mode of treatment - Co-treat (minutes) 0   PT Mode of Treatment - Total time(minutes) 90 minutes   PT Cumulative Minutes 895   Therapy Time missed   Time missed?  No

## 2022-06-17 NOTE — PROGRESS NOTES
06/17/22 1230   Pain Assessment   Pain Assessment Tool 0-10   Pain Score No Pain   Restrictions/Precautions   Precautions Bed/chair alarms;Cognitive; Fall Risk;Hard of hearing;Supervision on toilet/commode   Weight Bearing Restrictions No   ROM Restrictions No   Sit to Stand   Type of Assistance Needed Supervision   Physical Assistance Level No physical assistance   Comment no AD   Sit to Stand CARE Score 4   Bed-Chair Transfer   Type of Assistance Needed Supervision   Physical Assistance Level No physical assistance   Comment no AD   Chair/Bed-to-Chair Transfer CARE Score 4   Toileting Hygiene   Type of Assistance Needed Supervision   Physical Assistance Level No physical assistance   Comment no AD; VCs for hand washing and flushing toilet   [address] Score 4   Toilet Transfer   Type of Assistance Needed Supervision   Physical Assistance Level No physical assistance   Comment no AD to standard toilet   Toilet Transfer CARE Score 4   Exercise Tools   UE Ergometer pt completes UE strengthenig using [facility] UBE completing 5 min forward and 5 min backward focusing on UE strengthening and overall endurance for ADLs/transfers  pt tolerates well with rest breaks in between each set  Cognition   Overall Cognitive Status Impaired   Arousal/Participation Alert; Cooperative   Attention Attends with cues to redirect   Orientation Level Oriented X4   Memory Decreased recall of precautions   Following Commands Follows one step commands with increased time or repetition   Additional Activities   Additional Activities Comments pt engages in func mob in [facility] hallway w/o AD, retrieving letters A-P from hallway walls focusing on functional scanning, mobility, balance, func reaching  pt tolerates continuous mobility for 15 minutes during task w/o rest break, no fatigue/dizziness noted  overall supervision provided for safety     Activity Tolerance   Activity Tolerance Patient tolerated treatment well   Medical Staff Made Aware RN aware that pt would like [facility]   Assessment   Treatment Assessment pt engages in 90 minute skilled OT session focusing on toileting tasks, func mob w/o AD, UE strengthening, community re-integration  see above for full func details  pt continues to demo great progress toward OT goals progressing to supervision for basic transfers and toileting tasks  progressed pt to [facility] completed focusing on spouse assisting pt to/from bathroom and assisting with toileting as needed demo'ing good safety and insight to pt needs  RN, PT, OT, rehab aid, and white board/sticky note updated to reflect above  also, alarms to be off when spouse here, however alarms to be ON when spouse not here  spouse to ring call bell and have staff engage alarm when spouse leaving room  pt and spouse verbalize understanding  recommend continued skilled care to focus on ADL retraining, func mob w/o AD, higher level balance, standing ami, UE strengthening, in order to decrease burden of care at d/c  Prognosis Good   Problem List Decreased strength; Impaired balance;Decreased coordination;Decreased mobility; Impaired hearing   Barriers to Discharge Inaccessible home environment;Decreased caregiver support   Plan   Treatment/Interventions ADL retraining;Functional transfer training; Therapeutic exercise; Endurance training;Cognitive reorientation;Patient/family training;Equipment eval/education; Compensatory technique education   OT Therapy Minutes   OT Time In 1230   OT Time Out 1400   OT Total Time (minutes) 90   OT Mode of treatment - Individual (minutes) 90   OT Mode of treatment - Concurrent (minutes) 0   OT Mode of treatment - Group (minutes) 0   OT Mode of treatment - Co-treat (minutes) 0   OT Mode of Treatment - Total time(minutes) 90 minutes   OT Cumulative Minutes 900   Therapy Time missed   Time missed?  No

## 2022-06-17 NOTE — CASE MANAGEMENT
Cm called JUSTIN Hernandez and scheduled outpt PT  It will be on:    8:00 am for PT evaluation on Friday, June 24nd  They will schedule OT evaluation at this appointment

## 2022-06-17 NOTE — PROGRESS NOTES
PM&R PROGRESS NOTE:  Domingo Racer 79 y o  male MRN: [id number]  Unit/Bed#: -07 Encounter: [id number]        Rehabilitation Diagnosis: Impairment of mobility, safety and Activities of Daily Living (ADLs) due to Brain Dysfunction:  02 22  Traumatic, Closed Injury    HPI: Patient is a 80 yo male with h/o fall and L SDH which was being monitored by neurosx with serial CTH however patient's recent OP CTH showed expansion of L SDH with increasing midline shift therefore patient underwent L craniotomy for SDH evacuation on 5/28 by Dr Zurdo Gaitan  A L MMA embolization was attempted as well however was aborted due to anatomic variation that would not allow for the procedure  SUBJECTIVE: Patient seen and examined at bedside  Also saw later and spoke with daughtrer and wife, all questions answered  Will be discharging 6/21 with outpatient PT at [facility] on 6/24 at 8am and outpt OT as well  Denies fever, chills, nausea, emesis, cough, shortness of breath, diarrhea, constipation  ASSESSMENT: Stable, progressing      PLAN:    Rehabilitation   Functional deficits:  Self care, mobility   Continue current rehabilitation plan of care to maximize function       Functional update:   Physical Therapy Occupational Therapy Speech Therapy   Weight Bearing Status: Full Weight Bearing  Transfers: Incidental Touching, Supervision  Bed Mobility: Supervision  Amulation Distance (ft): 150 feet  Ambulation: Incidental Touching  Assistive Device for Ambulation:  (none)  Number of Stairs: 12 (FF)  Assistive Device for Stairs: Right Hand Rail  Stair Assistance: Minimal Assistance  Discharge Recommendations: Home with:  [address] with[de-identified] Family Support, 24 Hour Assisteance   Eating: Independent  Grooming: Minimal Assistance  Bathing: Minimal Assistance  Bathing: Minimal Assistance  Upper Body Dressing: Minimal Assistance  Lower Body Dressing: Minimal Assistance  Toileting: Minimal Assistance  Toilet Transfer: Minimal Assistance  Cognition: Exceptions to WNL  Cognition: Decreased Memory, Decreased Safety  Orientation: Person, Place, Time, Situation                  Estimated Discharge: 6/23      Pain   PRN acetaminophen, Oxycodone 2 5-5mg Q4H PRN    DVT prophylaxis   heparin    Bladder plan   Continent    Bowel plan   Continent, Last BM 6/14      Assessment/Plan:     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: was being monitored with serial CTH however recent OP CTH showed expansion of L SDH with increased midline shift therefore patient underwent L craniotomy for SDH evacuation on 5/28 by Dr Emmanuelle Pierre, a L MMA embolization was attempted but ultimately aborted due to anatomic variation that would not allow for procedure, s/p keppra course for ppx, goal SBP<160 per neurosx, holding AP/AC/NSAIDs per neurosx (not on AC/AP agents at home PTA) but was cleared by neurosx in acute care for HSQ for DVT ppx and patient was started on HSQ in acute care on 6/3; OP FU with neurosx scheduled for 6/15 and 7/7 with FU CTH 2-3 days PTA per neurosx protocol ([address] x 2 already e-prescribed in EMR by neurosx team)- [address] and neurosurgery follow up completed 6/14 and interval decrease in hematoma size       Dyslipidemia: therapeutic substitution for home zocor 20 mg qpm      Urinary retention: per d/w spouse appears to have symptoms of enlarged prostate at home, started on flomax in acute care however IM dc'd due to low BPs, checking PVRs (last 2 PVRs were 170 & 0)      Thrombocytosis: mild at 432 (), likely reactive     h/o rt caudate lacunar infarct: incidental finding on imaging in acute care, therapeutic substitution for home zocor 20 mg qpm, not on AP/AC at home however not currently a candidate for AP/AC due to 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 per neurosx recs     Hypotension: Midodrine decreased to 2 5mg daily, discontinued teds and abdominal binder and will monitor over the next couple days and plan to attempt trial off all midodrine     Insomnia- Currently on 6mg of melatonin, missed home     Incidental findings:     1) abnormalities on echocardiogram including but not limited to grade 1 DD & abnormal septal motion     2) abnormalities on EKG including but not limited to wide QRS/RBBB     3) mild left maxillary and left ethmoid mucosal thickening       No new labs to review      ROS:  A ten point review of systems was completed on 06/17/22 and pertinent positives are listed in subjective section  All other systems reviewed were negative  OBJECTIVE:   /71 (BP Location: Left arm)   Pulse 79   Temp 97 9 °F (36 6 °C) (Oral)   Resp 18   Ht 5' 11" (1 803 m)   Wt 75 8 kg (167 lb 1 7 oz)   SpO2 97%   BMI 23 31 kg/m²     Physical Exam  Constitutional:       General: He is not in acute distress  Appearance: Normal appearance  HENT:      Head: Normocephalic and atraumatic  Right Ear: External ear normal       Left Ear: External ear normal       Ears:      Comments: Can not hear even with amplifier     Nose: Nose normal  No rhinorrhea  Mouth/Throat:      Mouth: Mucous membranes are moist       Pharynx: Oropharynx is clear  Eyes:      General: No scleral icterus  Extraocular Movements: Extraocular movements intact  Cardiovascular:      Rate and Rhythm: Normal rate  Pulses: Normal pulses  Pulmonary:      Effort: Pulmonary effort is normal  No respiratory distress  Abdominal:      General: There is no distension  Palpations: Abdomen is soft  Musculoskeletal:      Cervical back: Normal range of motion  No tenderness  Right lower leg: No edema  Left lower leg: No edema  Skin:     General: Skin is warm and dry  Neurological:      Mental Status: He is alert and oriented to person, place, and time  Sensory: No sensory deficit ( timespe)  Motor: No weakness        Coordination: Coordination abnormal    Psychiatric:         Mood and Affect: Mood normal          Behavior: Behavior normal           Lab Results Component Value Date    WBC 5 16 06/06/2022    HGB 13 3 06/06/2022    HCT 38 6 06/06/2022    MCV 92 06/06/2022     (H) 06/06/2022     Lab Results   Component Value Date    SODIUM 138 06/06/2022    K 4 0 06/06/2022     06/06/2022    CO2 29 06/06/2022    BUN 15 06/06/2022    CREATININE 0 70 06/06/2022    GLUC 93 06/06/2022    CALCIUM 9 3 06/06/2022     Lab Results   Component Value Date    INR 1 02 06/02/2022    INR 1 10 05/29/2022    INR 1 04 05/28/2022    PROTIME 13 0 06/02/2022    PROTIME 13 7 05/29/2022    PROTIME 13 2 05/28/2022           Current Facility-Administered Medications:     acetaminophen (TYLENOL) tablet 650 mg, 650 mg, Oral, Q6H PRN, Marcie Black MD, 650 mg at 06/16/22 1374    bisacodyl (DULCOLAX) rectal suppository 10 mg, 10 mg, Rectal, Daily PRN, Marcie Black MD    docusate sodium (COLACE) capsule 100 mg, 100 mg, Oral, BID, Sanjana Butcher PA-C, 100 mg at 06/17/22 1732    heparin (porcine) subcutaneous injection 5,000 Units, 5,000 Units, Subcutaneous, Q8H [address], Marcie Black MD, 5,000 Units at 06/17/22 1433    lidocaine (XYLOCAINE) 2 % topical gel, , Topical, BID PRN, Marcie Black MD    melatonin tablet 6 mg, 6 mg, Oral, HS, Marcie Black MD, 6 mg at 06/16/22 2346    midodrine (PROAMATINE) tablet 2 5 mg, 2 5 mg, Oral, Daily With Lunch, RONY Jones, 2 5 mg at 06/14/22 1319    midodrine (PROAMATINE) tablet 2 5 mg, 2 5 mg, Oral, Daily, Sanjana Butcher PA-C, 2 5 mg at 06/17/22 0803    oxyCODONE (ROXICODONE) IR tablet 5 mg, 5 mg, Oral, Q4H PRN, 5 mg at 06/09/22 0654 **OR** oxyCODONE (ROXICODONE) IR tablet 2 5 mg, 2 5 mg, Oral, Q4H PRN, Marcie Black MD    polyethylene glycol (MIRALAX) packet 17 g, 17 g, Oral, Daily PRN, Marcie Black MD, 17 g at 06/17/22 1732    pravastatin (PRAVACHOL) tablet 40 mg, 40 mg, Oral, Daily With Amanda López MD, 40 mg at 06/17/22 1732    senna (SENOKOT) tablet 8 6 mg, 1 tablet, Oral, HS, Sanjana Butcher PA-C, 8 6 mg at 06/16/22 5107    Past Medical History:   Diagnosis Date    Arthritis     Encounter for general adult medical examination without abnormal findings 3/20/2019    Hyperlipidemia        Patient Active Problem List    Diagnosis Date Noted    Constipation 06/02/2022    Urinary retention 06/02/2022    SDH (subdural hematoma) ([facility] [address] ) 05/27/2022    Primary osteoarthritis of left knee 05/17/2022    Hygroma 04/26/2022    Right hand pain     Carpal tunnel syndrome on right     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 09/02/2021    Overweight (BMI 25 0-29 9) 01/27/2021    Negative depression screening 09/26/2019    Medicare annual wellness visit, subsequent 03/20/2019    Hypercholesterolemia 07/12/2018    Borderline hypertension 07/12/2018    Hearing loss 04/08/2009          Juliet Santos DO  Physical Medicine and [place]

## 2022-06-18 PROCEDURE — 97110 THERAPEUTIC EXERCISES: CPT

## 2022-06-18 PROCEDURE — 97112 NEUROMUSCULAR REEDUCATION: CPT

## 2022-06-18 PROCEDURE — 99232 SBSQ HOSP IP/OBS MODERATE 35: CPT | Performed by: INTERNAL MEDICINE

## 2022-06-18 PROCEDURE — 97116 GAIT TRAINING THERAPY: CPT

## 2022-06-18 RX ADMIN — POLYETHYLENE GLYCOL 3350 17 G: 17 POWDER, FOR SOLUTION ORAL at 18:02

## 2022-06-18 RX ADMIN — CARBIDOPA AND LEVODOPA 2.5 MG: 50; 200 TABLET, EXTENDED RELEASE ORAL at 13:00

## 2022-06-18 RX ADMIN — PRAVASTATIN SODIUM 40 MG: 40 TABLET ORAL at 18:00

## 2022-06-18 RX ADMIN — HEPARIN SODIUM 5000 UNITS: 5000 INJECTION INTRAVENOUS; SUBCUTANEOUS at 13:00

## 2022-06-18 RX ADMIN — HEPARIN SODIUM 5000 UNITS: 5000 INJECTION INTRAVENOUS; SUBCUTANEOUS at 21:27

## 2022-06-18 RX ADMIN — HEPARIN SODIUM 5000 UNITS: 5000 INJECTION INTRAVENOUS; SUBCUTANEOUS at 05:15

## 2022-06-18 RX ADMIN — SENNOSIDES 8.6 MG: 8.6 TABLET, FILM COATED ORAL at 21:28

## 2022-06-18 RX ADMIN — DOCUSATE SODIUM 100 MG: 100 CAPSULE, LIQUID FILLED ORAL at 18:00

## 2022-06-18 RX ADMIN — CARBIDOPA AND LEVODOPA 2.5 MG: 50; 200 TABLET, EXTENDED RELEASE ORAL at 09:46

## 2022-06-18 RX ADMIN — DOCUSATE SODIUM 100 MG: 100 CAPSULE, LIQUID FILLED ORAL at 09:45

## 2022-06-18 NOTE — PROGRESS NOTES
06/18/22 1430   Pain Assessment   Pain Assessment Tool 0-10   Pain Score No Pain   Restrictions/Precautions   Precautions Bed/chair alarms;Hard of hearing;Supervision on toilet/commode; Fall Risk;Cognitive   Cognition   Overall Cognitive Status Impaired   Arousal/Participation Alert; Cooperative   Following Commands Follows one step commands without difficulty   Sit to Stand   Type of Assistance Needed Supervision   Physical Assistance Level No physical assistance   Comment no AD   Sit to Stand CARE Score 4   Bed-Chair Transfer   Type of Assistance Needed Supervision   Physical Assistance Level No physical assistance   Chair/Bed-to-Chair Transfer CARE Score 4   Transfer Bed/Chair/Wheelchair   Limitations Noted In Balance   Adaptive Equipment None   Stand Pivot Supervision   Sit to Stand Supervision   Stand to Sit Supervision   Walk 10 Feet   Type of Assistance Needed Supervision   Physical Assistance Level No physical assistance   Walk 10 Feet CARE Score 4   Walk 50 Feet with Two Turns   Type of Assistance Needed Supervision   Physical Assistance Level No physical assistance   Walk 50 Feet with Two Turns CARE Score 4   Walk 150 Feet   Type of Assistance Needed Supervision   Physical Assistance Level No physical assistance   Walk 150 Feet CARE Score 4   Walking 10 Feet on Uneven Surfaces   Type of Assistance Needed Supervision   Physical Assistance Level No physical assistance   Comment foam mat   Walking 10 Feet on Uneven Surfaces CARE Score 4   Ambulation   Does the patient walk? 2  Yes   Primary Mode of Locomotion Prior to Admission Walk   Distance Walked (feet)   (400ft and 300ft)   Assist Device Other  (none)   Gait Pattern Inconsistant [place];Decreased foot clearance; Improper weight shift   Limitations Noted In Balance; Heel Strike   Provided Assistance with: Direction   Walk Assist Level Supervision   Findings Pt wearing new shoes need vc to inc R foot clearance   He change his shoes and demonstrate sufficient foot clearance   Wheel 50 Feet with Two Turns   Reason if not Attempted Activity not applicable   Wheel 50 Feet with Two Turns CARE Score 9   Wheel 150 Feet   Reason if not Attempted Activity not applicable   Wheel 263 Feet CARE Score 9   Wheelchair mobility   Does the patient use a wheelchair? 0  No   Curb or Single Stair   Style negotiated Curb   Type of Assistance Needed Supervision   Physical Assistance Level No physical assistance   1 Step (Curb) CARE Score 4   4 Steps   Type of Assistance Needed Supervision   Physical Assistance Level No physical assistance   Comment R HR   4 Steps CARE Score 4   12 Steps   Type of Assistance Needed Supervision   Physical Assistance Level No physical assistance   12 Steps CARE Score 4   Stairs   Type Stairs   # of Steps 12   Weight Bearing Precautions Fall Risk   Assist Devices Single Rail   Findings FF, nursing home ascending with reciprocal pattern then switch to step to  Pt descend c step to   Picking Up Object   Type of Assistance Needed Supervision   Physical Assistance Level No physical assistance   Comment no AD, marker   Picking Up Object CARE Score 4   Toilet Transfer   Type of Assistance Needed Supervision; Adaptive equipment   Physical Assistance Level No physical assistance   Comment grab bar   Toilet Transfer CARE Score 4   Toilet Transfer   Surface Assessed Raised Toilet   Adaptive Equipment Grab Bar   Therapeutic Interventions   Balance Walking forward, backward and sidestepping while bouncing and catching TB, no LOB  Amb stepping over obstacle on floor, CGA to min assist due to R foot catching on bolster  Equipment Use   NuStep L5 x 10 mins B UE/LE   Assessment   Treatment Assessment Patient participated in PT focus on mobility and balance  Pt able to transfer s AD, S with occasional cueing for safety  He amb s AD x 300ft and 400ft, S  Pt was wearing new shoes noted dec R foot clearance however when he change to his old shoe, present c sufficient foot clearance  Cont PT as per POC to maximized functional mobility independence and safety to allow safe d/c to home  Problem List Decreased strength; Impaired balance;Decreased mobility; Decreased cognition; Impaired hearing   Barriers to Discharge Inaccessible home environment;Decreased caregiver support   PT Barriers   Physical Impairment Decreased strength; Impaired balance;Decreased mobility; Decreased cognition; Impaired hearing   Functional Limitation Car transfers;Stair negotiation;Standing;Transfers; Walking   Plan   Treatment/Interventions Functional transfer training;LE strengthening/ROM; Elevations; Endurance training; Therapeutic exercise;Patient/family training;Gait training   Progress Progressing toward goals   Recommendation   PT Discharge Recommendation Home with outpatient rehabilitation   PT Therapy Minutes   PT Time In 1430   PT Time Out 1530   PT Total Time (minutes) 60   PT Mode of treatment - Individual (minutes) 60   PT Mode of treatment - Concurrent (minutes) 0   PT Mode of treatment - Group (minutes) 0   PT Mode of treatment - Co-treat (minutes) 0   PT Mode of Treatment - Total time(minutes) 60 minutes   PT Cumulative Minutes 955   Therapy Time missed   Time missed?  No

## 2022-06-18 NOTE — PLAN OF CARE
Problem: SAFETY ADULT  Goal: Patient will remain free of falls  Description: INTERVENTIONS:  - Educate patient/family on patient safety including physical limitations  - Instruct patient to call for assistance with activity   - Consult OT/PT to assist with strengthening/mobility   - Keep Call bell within reach  - Keep bed low and locked with side rails adjusted as appropriate  - Keep care items and personal belongings within reach  - Initiate and maintain comfort rounds  - Make Fall Risk Sign visible to staff  - Offer Toileting every 2 Hours, in advance of need  - Initiate/Maintain alarm  - Obtain necessary fall risk management equipment:   - Apply yellow socks and bracelet for high fall risk patients  - Consider moving patient to room near nurses station  Outcome: Progressing

## 2022-06-18 NOTE — PLAN OF CARE
Reviewed    Problem: PAIN - ADULT  Goal: Verbalizes/displays adequate comfort level or baseline comfort level  Description: Interventions:  - Encourage patient to monitor pain and request assistance  - Assess pain using appropriate pain scale  - Administer analgesics based on type and severity of pain and evaluate response  - Implement non-pharmacological measures as appropriate and evaluate response  - Consider cultural and social influences on pain and pain management  - Notify physician/advanced practitioner if interventions unsuccessful or patient reports new pain  Outcome: Progressing     Problem: INFECTION - ADULT  Goal: Absence or prevention of progression during hospitalization  Description: INTERVENTIONS:  - Assess and monitor for signs and symptoms of infection  - Monitor lab/diagnostic results  - Monitor all insertion sites, i e  indwelling lines, tubes, and drains  - Monitor endotracheal if appropriate and nasal secretions for changes in amount and color  - Siler appropriate cooling/warming therapies per order  - Administer medications as ordered  - Instruct and encourage patient and family to use good hand hygiene technique  - Identify and instruct in appropriate isolation precautions for identified infection/condition  Outcome: Progressing  Goal: Absence of fever/infection during neutropenic period  Description: INTERVENTIONS:  - Monitor WBC    Outcome: Progressing     Problem: SAFETY ADULT  Goal: Patient will remain free of falls  Description: INTERVENTIONS:  - Educate patient/family on patient safety including physical limitations  - Instruct patient to call for assistance with activity   - Consult OT/PT to assist with strengthening/mobility   - Keep Call bell within reach  - Keep bed low and locked with side rails adjusted as appropriate  - Keep care items and personal belongings within reach  - Initiate and maintain comfort rounds  - Make Fall Risk Sign visible to staff  - Offer Toileting every 4 Hours, in advance of need  - Initiate/Maintain bed and chair alarm  - Apply yellow socks and bracelet for high fall risk patients  - Consider moving patient to room near nurses station  Outcome: Progressing  Goal: Maintain or return to baseline ADL function  Description: INTERVENTIONS:  -  Assess patient's ability to carry out ADLs; assess patient's baseline for ADL function and identify physical deficits which impact ability to perform ADLs (bathing, care of mouth/teeth, toileting, grooming, dressing, etc )  - Assess/evaluate cause of self-care deficits   - Assess range of motion  - Assess patient's mobility; develop plan if impaired  - Assess patient's need for assistive devices and provide as appropriate  - Encourage maximum independence but intervene and supervise when necessary  - Involve family in performance of ADLs  - Assess for home care needs following discharge   - Consider OT consult to assist with ADL evaluation and planning for discharge  - Provide patient education as appropriate  Outcome: Progressing  Goal: Maintains/Returns to pre admission functional level  Description: INTERVENTIONS:  - Perform BMAT or MOVE assessment daily    - Set and communicate daily mobility goal to care team and patient/family/caregiver     - Collaborate with rehabilitation services on mobility goals if consulted  - Out of bed for toileting  - Record patient progress and toleration of activity level   Outcome: Progressing     Problem: DISCHARGE PLANNING  Goal: Discharge to home or other facility with appropriate resources  Description: INTERVENTIONS:  - Identify barriers to discharge w/patient and caregiver  - Arrange for needed discharge resources and transportation as appropriate  - Identify discharge learning needs (meds, wound care, etc )  - Arrange for interpretive services to assist at discharge as needed  - Refer to Case Management Department for coordinating discharge planning if the patient needs post-hospital services based on physician/advanced practitioner order or complex needs related to functional status, cognitive ability, or social support system  Outcome: Progressing     Problem: Prexisting or High Potential for Compromised Skin Integrity  Goal: Skin integrity is maintained or improved  Description: INTERVENTIONS:  - Identify patients at risk for skin breakdown  - Assess and monitor skin integrity  - Assess and monitor nutrition and hydration status  - Monitor labs   - Assess for incontinence   - Turn and reposition patient  - Assist with mobility/ambulation  - Relieve pressure over bony prominences  - Avoid friction and shearing  - Provide appropriate hygiene as needed including keeping skin clean and dry  - Evaluate need for skin moisturizer/barrier cream  - Collaborate with interdisciplinary team   - Patient/family teaching  - Consider wound care consult   Outcome: Progressing     Problem: Potential for Falls  Goal: Patient will remain free of falls  Description: INTERVENTIONS:  - Educate patient/family on patient safety including physical limitations  - Instruct patient to call for assistance with activity   - Consult OT/PT to assist with strengthening/mobility   - Keep Call bell within reach  - Keep bed low and locked with side rails adjusted as appropriate  - Keep care items and personal belongings within reach  - Initiate and maintain comfort rounds  - Make Fall Risk Sign visible to staff  - Offer Toileting every 4 Hours, in advance of need  - Initiate/Maintain bed and chair alarm  - Apply yellow socks and bracelet for high fall risk patients  - Consider moving patient to room near nurses station  Outcome: Progressing     Problem: Nutrition/Hydration-ADULT  Goal: Nutrient/Hydration intake appropriate for improving, restoring or maintaining nutritional needs  Description: Monitor and assess patient's nutrition/hydration status for malnutrition   Collaborate with interdisciplinary team and initiate plan and interventions as ordered  Monitor patient's weight and dietary intake as ordered or per policy  Utilize nutrition screening tool and intervene as necessary  Determine patient's food preferences and provide high-protein, high-caloric foods as appropriate       INTERVENTIONS:  - Monitor oral intake, urinary output, labs, and treatment plans  - Assess nutrition and hydration status and recommend course of action  - Evaluate amount of meals eaten  - Assist patient with eating if necessary   - Allow adequate time for meals  - Recommend/ encourage appropriate diets, oral nutritional supplements, and vitamin/mineral supplements  - Order, calculate, and assess calorie counts as needed  - Recommend, monitor, and adjust tube feedings and TPN/PPN based on assessed needs  - Assess need for intravenous fluids  - Provide specific nutrition/hydration education as appropriate  - Include patient/family/caregiver in decisions related to nutrition  Outcome: Progressing

## 2022-06-18 NOTE — PROGRESS NOTES
Internal Medicine Progress Note  Patient: Watson Hermosillo  Age/sex: 79 y o  male  Medical Record #: [id number]      ASSESSMENT/PLAN: (Interval History)  Watson Hermosillo is seen and examined and management for following issues:    SDH s/p Lt craniotomy  · Monitor incisions  · Follow-up with Neurosurgery in 2 weeks  · S/p Keppra for 7 days for seizure prophylaxis  Hyperlipidemia  · Continue statin  · Low cholesterol diet  Urinary retention  · Was placed on flomax on acute side  · dc'd flomax 6/7 d/t orthostasis    Orthostasis  · Encourage fluids  · Off flomax as above  · No longer using TEDs or abdominal binder  · No further dizziness  · Will cont current midodrine    [place]  · Assistive device at bedside    DC planning: reteam    The above assessment and plan was reviewed and updated as determined by my evaluation of the patient on 6/18/2022  Labs: Invalid input(s): LABGLOM, CMP                Review of Scheduled Meds:  Current Facility-Administered Medications   Medication Dose Route Frequency Provider Last Rate    acetaminophen  650 mg Oral Q6H PRN Tyrell Martinez MD      bisacodyl  10 mg Rectal Daily PRN Tyrell Martinez MD      docusate sodium  100 mg Oral BID Sanjana Butcher PA-C      heparin (porcine)  5,000 Units Subcutaneous Q8H [address] Tyrell Martinez MD      lidocaine   Topical BID PRN Tyrell Martinez MD      melatonin  6 mg Oral HS Tyrell Martinez MD      midodrine  2 5 mg Oral Daily With Lunch RONY Jones      midodrine  2 5 mg Oral Daily Sanjana Butcher PA-C      oxyCODONE  5 mg Oral Q4H PRN Tyrell Martinez MD      Or   [facility] oxyCODONE  2 5 mg Oral Q4H PRN Tyrell Martinez MD      polyethylene glycol  17 g Oral Daily PRN Tyrell Martinez MD      pravastatin  40 mg Oral Daily With Nargis Jimenez MD      senna  1 tablet Oral HS Sanjana Butcher PA-C         Subjective/ HPI: Patient seen and examined   Patients overnight issues or events were reviewed with nursing or staff during rounds or morning huddle session  New or overnight issues include the following:     Pt seen in his room  He is pleased with his DC date of Tuesday  He denies any current complaints  ROS:   A 10 point ROS was performed; negative except as noted above  Imaging:     CT head wo contrast   Final Result by Cheli Magana DO (06/15 1952)      Interval evolution and decrease in size of the left-sided subdural collection/hematoma with associated improvement in the mass effect, and left to right midline shift, as described  No other significant interval change  Postsurgical changes and other findings as above  Clinical follow-up recommended              Workstation performed: [id number]             *Labs /Radiology studies Reviewed  *Medications  reviewed and reconciled as needed  *Please refer to order section for additional ordered labs studies  *Case discussed with primary attending during morning huddle case rounds    Physical Examination:  Vitals:   Vitals:    06/17/22 0829 06/17/22 1500 06/17/22 2055 06/18/22 0530   BP: 94/68 139/71 131/81 120/80   BP Location: Right arm Left arm Right arm Left arm   Pulse: (!) 108 79 79 84   Resp:  18 16 18   Temp:  97 9 °F (36 6 °C) 98 3 °F (36 8 °C) 98 1 °F (36 7 °C)   TempSrc:  Oral Oral Oral   SpO2:  97% 95% 95%   Weight:       Height:         GEN: No apparent distress, interactive  NEURO: Alert and oriented x3  HEENT: Pupils are equal and reactive, EOMI, mucous membranes are moist, face symmetrical; extremely [place]  CV: S1 S2 regular, no MRG, no peripheral edema noted  RESP: Lungs are clear bilaterally, no wheezes, rales or rhonchi noted, on room air, respirations easy and non labored  GI: Flat, soft non tender, non distended; +BS x4  : Voiding without difficulty  MUSC: Moves all extremities  SKIN: pale, poor turgor, no evidence of breakdown; incision intact    The above physical exam was reviewed and updated as determined by my evaluation of the patient on 6/18/2022  Invasive Devices  Report    None                    VTE Pharmacologic Prophylaxis: Heparin  Code Status: Level 1 - Full Code  Current Length of Stay: 12 day(s)      Total time spent:  30 minutes with more than 50% spent counseling/coordinating care  Counseling includes discussion with patient re: progress  and discussion with patient of his/her current medical state/information  Coordination of patient's care was performed in conjunction with primary service  Time invested included review of patient's labs, vitals, and management of their comorbidities with continued monitoring  In addition, this patient was discussed with medical team including physician and advanced extenders  The care of the patient was extensively discussed and appropriate treatment plan was formulated unique for this patient  ** Please Note:  voice to text software may have been used in the creation of this document   Although proof errors in transcription or interpretation are a potential of such software**

## 2022-06-19 PROBLEM — R94.31 EKG ABNORMALITIES: Status: ACTIVE | Noted: 2022-06-19

## 2022-06-19 PROBLEM — R33.9 URINARY RETENTION: Status: RESOLVED | Noted: 2022-06-02 | Resolved: 2022-06-19

## 2022-06-19 PROBLEM — I51.89 DIASTOLIC DYSFUNCTION: Status: ACTIVE | Noted: 2022-06-19

## 2022-06-19 PROBLEM — E44.0 MODERATE PROTEIN-CALORIE MALNUTRITION (HCC): Status: ACTIVE | Noted: 2022-06-19

## 2022-06-19 PROBLEM — I95.9 HYPOTENSION: Status: ACTIVE | Noted: 2022-06-19

## 2022-06-19 PROCEDURE — 97530 THERAPEUTIC ACTIVITIES: CPT

## 2022-06-19 PROCEDURE — 99232 SBSQ HOSP IP/OBS MODERATE 35: CPT | Performed by: PHYSICIAN ASSISTANT

## 2022-06-19 PROCEDURE — 97110 THERAPEUTIC EXERCISES: CPT

## 2022-06-19 PROCEDURE — 97535 SELF CARE MNGMENT TRAINING: CPT

## 2022-06-19 PROCEDURE — 97116 GAIT TRAINING THERAPY: CPT

## 2022-06-19 RX ADMIN — HEPARIN SODIUM 5000 UNITS: 5000 INJECTION INTRAVENOUS; SUBCUTANEOUS at 21:12

## 2022-06-19 RX ADMIN — PRAVASTATIN SODIUM 40 MG: 40 TABLET ORAL at 17:12

## 2022-06-19 RX ADMIN — CARBIDOPA AND LEVODOPA 2.5 MG: 50; 200 TABLET, EXTENDED RELEASE ORAL at 12:53

## 2022-06-19 RX ADMIN — CARBIDOPA AND LEVODOPA 2.5 MG: 50; 200 TABLET, EXTENDED RELEASE ORAL at 09:55

## 2022-06-19 RX ADMIN — Medication 6 MG: at 21:12

## 2022-06-19 RX ADMIN — SENNOSIDES 8.6 MG: 8.6 TABLET, FILM COATED ORAL at 21:13

## 2022-06-19 RX ADMIN — DOCUSATE SODIUM 100 MG: 100 CAPSULE, LIQUID FILLED ORAL at 09:55

## 2022-06-19 RX ADMIN — HEPARIN SODIUM 5000 UNITS: 5000 INJECTION INTRAVENOUS; SUBCUTANEOUS at 15:10

## 2022-06-19 RX ADMIN — DOCUSATE SODIUM 100 MG: 100 CAPSULE, LIQUID FILLED ORAL at 17:12

## 2022-06-19 RX ADMIN — HEPARIN SODIUM 5000 UNITS: 5000 INJECTION INTRAVENOUS; SUBCUTANEOUS at 05:39

## 2022-06-19 NOTE — ASSESSMENT & PLAN NOTE
Malnutrition Findings:   Adult Malnutrition type: Acute illness  Adult Degree of Malnutrition: Malnutrition of moderate degree  Malnutrition Characteristics: Weight loss, Fat loss     On reg/thin diets  Appetite is fine  Nutrition following  Outpatient f/u with PCP                  360 Statement: continue to monitor intake/weight    BMI Findings: Body mass index is 23 31 kg/m²

## 2022-06-19 NOTE — PROGRESS NOTES
06/19/22 1222   Pain Assessment   Pain Assessment Tool 0-10   Pain Score No Pain   Restrictions/Precautions   Precautions Bed/chair alarms;Cognitive; Fall Risk;Supervision on toilet/commode;Hard of hearing   Braces or Orthoses   (BP stable this session, did not requrie ABD malorie or Cindy)   Subjective   Subjective pt agreeable to perform skilled PT   Roll Left and Right   Type of Assistance Needed Independent   Roll Left and Right CARE Score 6   Sit to Lying   Type of Assistance Needed Independent   Sit to Lying CARE Score 6   Lying to Sitting on Side of Bed   Type of Assistance Needed Independent   Lying to Sitting on Side of Bed CARE Score 6   Sit to Stand   Type of Assistance Needed Supervision   Sit to Stand CARE Score 4   Bed-Chair Transfer   Type of Assistance Needed Supervision   Chair/Bed-to-Chair Transfer CARE Score 4   Transfer Bed/Chair/Wheelchair   Adaptive Equipment None   Car Transfer   Type of Assistance Needed Supervision;Verbal cues   Comment VC 's for safety and sitting   Car Transfer CARE Score 4   Walk 10 Feet   Type of Assistance Needed Supervision   Walk 10 Feet CARE Score 4   Walk 50 Feet with Two Turns   Type of Assistance Needed Supervision   Walk 50 Feet with Two Turns CARE Score 4   Walk 150 Feet   Type of Assistance Needed Supervision   Walk 150 Feet CARE Score 4   Walking 10 Feet on Uneven Surfaces   Type of Assistance Needed Supervision   Comment floor mat   Walking 10 Feet on Uneven Surfaces CARE Score 4   Ambulation   Does the patient walk? 2  Yes   Primary Mode of Locomotion Prior to Admission Walk   Distance Walked (feet)   (none)   Wheel 50 Feet with Two Turns   Reason if not Attempted Activity not applicable   Wheel 50 Feet with Two Turns CARE Score 9   Wheel 150 Feet   Reason if not Attempted Activity not applicable   Wheel 400 Feet CARE Score 9   Wheelchair mobility   Does the patient use a wheelchair? 0   No   Curb or Single Stair   Style negotiated Curb   Type of Assistance Needed Supervision   Physical Assistance Level No physical assistance   Comment curb step 6 inch   1 Step (Curb) CARE Score 4   4 Steps   Type of Assistance Needed Supervision   Comment R HR   4 Steps CARE Score 4   12 Steps   Type of Assistance Needed Supervision   Comment R HR FF   12 Steps CARE Score 4   Stairs   Type Stairs   # of Steps 12   Weight Bearing Precautions Fall Risk   Assist Devices Single Rail   Therapeutic Interventions   Flexibility B hamstring and gastroc stretching   Balance Amb stepping over obstacle on floor, CGA to min assist due to R foot catching on bolster   Neuromuscular Re-Education dynamic standing balance activities   Equipment Use   NuStep L5 x 10 min   Assessment   Treatment Assessment pt focus on ambulation w/o AD for safety and awareness not to fall , Overall strengthening and conditioning BLE, Also perform Nu Step bike lvl 5 for 10 min B LE UE  Pt cont to be [place] and cont to need S lvl at this time if DC at this time , Pt being DC next week  with his wife and all needs met , Cont POC to maximized functional mobility independence and safety to allow safe d/c to home  Pt ambulating at CS /DS at times , pt gait speed slow vc's for step pattern at times noted short steps   Pt overall progressing toward DC goals   Barriers to Discharge Inaccessible home environment;Decreased caregiver support   Plan   Progress Progressing toward goals   Recommendation   PT Discharge Recommendation Home with outpatient rehabilitation   PT Therapy Minutes   PT Time In 1222   PT Time Out 1330   PT Total Time (minutes) 68   PT Mode of treatment - Individual (minutes) 68   PT Mode of treatment - Concurrent (minutes) 0   PT Mode of treatment - Group (minutes) 0   PT Mode of treatment - Co-treat (minutes) 0   PT Mode of Treatment - Total time(minutes) 68 minutes   PT Cumulative Minutes 1023   Therapy Time missed   Time missed?  No

## 2022-06-19 NOTE — ASSESSMENT & PLAN NOTE
Noted incidentally on echo   G1DD with abnormal septal motion  Recommend outpatient f/u with PCP and Cardiology for further work-up

## 2022-06-19 NOTE — ASSESSMENT & PLAN NOTE
On midodrine to 2 5mg BID (before breakfast and lunch)  TEDs/abdominal binder were discontinued  Monitor

## 2022-06-19 NOTE — PROGRESS NOTES
Physical Medicine and Rehabilitation Progress Note  Sammy Price 79 y o  male MRN: [id number]  Unit/Bed#: -27 Encounter: [id number]    HPI:  Patient is a 80 yo male with h/o fall and L SDH which was being monitored by neurosx with serial CTH however patient's recent OP CTH showed expansion of L SDH with increasing midline shift therefore patient underwent L craniotomy for SDH evacuation on 5/28 by Dr Darylene Doss  A L MMA embolization was attempted as well however was aborted due to anatomic variation that would not allow for the procedure  Post-op uncomplicated and he was admitted to the [facility] on 6/6    Chief Complaint: No new issues    Interval History/Subjective:  Hard of hearing  Denies any pain  No acute events over the weekend  No chest pain, no shortness of breath  Spoke with his wife, she would like scripts sent to [facility] at [place] and they do not need refills for home meds  She also asked about options for knee arthritis, which I reviewed over the counter options  She has a compressive brace for him at home as well  They have gotten one HA injection with their PCP, and are planning on going back to continue  Last BM 6/19  ROS:  A 10 point review of systems was negative except for what is noted in the HPI  Today's Changes:  1  Will start prepping discharge today  2  Family training today  Total time spent:  35 minutes, with more than 50% spent counseling/coordinating care  Counseling includes discussion with patient re: progress in therapies, functional issues observed by therapy staff, and discussion with patient his/her current medical state/wellbeing  Coordination of patient's care was performed in conjunction with Internal Medicine service to monitor patient's labs, vitals, and management of their comorbidities       Assessment/Plan:     * SDH (subdural hematoma) (HCC)  Assessment & Plan  Acute on chronic L  Monitored outpatient where CTh showed expansion and increased midline shift  Admitted 5/28 for L craniotomy for SDH evacuation and drain placement (drains since removed)  Attempted L MMA embolization, but aborted due to anatomic variation that would not allow for procedure  S/p 7 days Keppra for seizure ppx  Goal SBP < 160  Hold all AP/AC/NSAIDs except DVT Ppx  6/14 CTH with decrease in hematoma size  NSx aware  Outpatient f/u with NSx on 7/7 with CTH 2-3 days prior  Continue PT/OT/SLP  EKG abnormalities  Assessment & Plan  Incidentally noted to have wide QRS and RBBB  Recommend outpatient f/u with PCP and Cardiology  Diastolic dysfunction  Assessment & Plan  Noted incidentally on echo   G1DD with abnormal septal motion  Recommend outpatient f/u with PCP and Cardiology for further work-up  Hypotension  Assessment & Plan  On midodrine to 2 5mg BID (before breakfast and lunch)  TEDs/abdominal binder were discontinued  Monitor  Moderate protein-calorie malnutrition ([facility] [address] )  Assessment & Plan  Malnutrition Findings:   Adult Malnutrition type: Acute illness  Adult Degree of Malnutrition: Malnutrition of moderate degree  Malnutrition Characteristics: Weight loss, Fat loss     On reg/thin diets  Appetite is fine  Nutrition following  Outpatient f/u with PCP                  360 Statement: continue to monitor intake/weight    BMI Findings: Body mass index is 23 31 kg/m²  Hypercholesterolemia  Assessment & Plan  Therapeutic substitution for home Zocor 20mg QPM        Health Maintenance  #Delirium/Sleep: At risk, no issues  Environmental interventions  Optimize pain, bowel, bladder, sleep-wake cycle management  Currently on melatonin  #Pain: Tylenol PRN, Oxycodone 2 5-5mg PRN (very minimal use)  #Bowel: Last BM 6/19  On senna 1 tab at bed time and docusate BID  Has miralax PRN, which he has used  #Bladder: Voiding and continent  #Skin/Pressure Injury Prevention: Turn Q2hr in bed, with weight shifts [id number] in wheelchair    #DVT Prophylaxis: HSQ, SCDs  #GI Prophylaxis: Not indicated  #Code Status: Full Code  #FEN: Reg/Thins  #Dispo: ADD 6/23 with outpatient PT/OT/SLP  Objective:    Functional Update:  PT:  ind for transfers, bed mobility, and most ambulation, but requires supervision for 150', uneven surfaces, and stairs  OT: Set-up eating, Ind oral hygiene, Sup shower/bathe self, Ind LB dressing, CGA footwear, Ind toileting hygiene, Ind toilet transfer   SLP: Sup for comprehension, expression, problem solving, and memory, mod Ind with extra time for social interaction  Allergies per EMR    Physical Exam:  Temp:  [97 5 °F (36 4 °C)-98 1 °F (36 7 °C)] 97 5 °F (36 4 °C)  HR:  [67-84] 84  Resp:  [16-17] 16  BP: (107-121)/(64-88) 107/66  Oxygen Therapy  SpO2: 97 %    Gen: No acute distress, Well-nourished, well-appearing  HEENT: Moist mucus membranes  Cardiovascular: Regular rate, rhythm, S1/S2  Distal pulses palpable  Heme/Extr: No edema  Pulmonary: Non-labored breathing  : No brooks  GI: Soft, non-tender, non-distended  MSK: Ambulating well - no arm swing noted during gait  Not using assistive device  Integumentary: Skin is warm, ry  Neuro: AAOx3, Very poor hearing  Speech is intact  Appropriate to questioning  Psych: Normal mood and affect  Diagnostic Studies: reviewed, no new imaging  No results found  Laboratory:  Reviewed,  No new labs  Invalid input(s): PLTCT        Invalid input(s): CA         Patient Active Problem List   Diagnosis    Hypercholesterolemia    Borderline hypertension    Medicare annual wellness visit, subsequent    Negative depression screening    Overweight (BMI 25 0-29  9)    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[facility] [address] )    Right hand pain    Carpal tunnel syndrome on right    Hygroma    Primary osteoarthritis of left knee    [facility]    Hearing loss    Constipation    Urinary retention         Medications  Current Facility-Administered Medications   Medication Dose Route Frequency Provider Last Rate    acetaminophen  650 mg Oral Q6H PRN Jordan Barr MD      bisacodyl  10 mg Rectal Daily PRN Jordan Barr MD      docusate sodium  100 mg Oral BID Sanjana Butcher PA-C      heparin (porcine)  5,000 Units Subcutaneous Q8H [address] Jordan Barr MD      lidocaine   Topical BID PRN Jordan Barr MD      melatonin  6 mg Oral HS Jordan Barr MD      midodrine  2 5 mg Oral Daily With Lunch RONY Jones      midodrine  2 5 mg Oral Daily Sanjana Butcher PA-C      oxyCODONE  5 mg Oral Q4H PRN Jordan Barr MD      Or   Manan Hoslaura oxyCODONE  2 5 mg Oral Q4H PRN Jordan Barr MD      polyethylene glycol  17 g Oral Daily PRN Jordan Barr MD      pravastatin  40 mg Oral Daily With Dinner Jordan Barr MD      senna  1 tablet Oral HS Sanjana Butcher PA-C            ** Please Note: Fluency Direct voice to text software may have been used in the creation of this document  **    Total time spent:  35 minutes, with more than 50% spent counseling/coordinating care  Counseling includes discussion with patient re: progress in therapies, functional issues observed by therapy staff, and discussion with patient his/her current medical state/wellbeing  Coordination of patient's care was performed in conjunction with Internal Medicine service to monitor patient's labs, vitals, and management of their comorbidities

## 2022-06-19 NOTE — PLAN OF CARE
Reviewed    Problem: PAIN - ADULT  Goal: Verbalizes/displays adequate comfort level or baseline comfort level  Description: Interventions:  - Encourage patient to monitor pain and request assistance  - Assess pain using appropriate pain scale  - Administer analgesics based on type and severity of pain and evaluate response  - Implement non-pharmacological measures as appropriate and evaluate response  - Consider cultural and social influences on pain and pain management  - Notify physician/advanced practitioner if interventions unsuccessful or patient reports new pain  Outcome: Progressing     Problem: INFECTION - ADULT  Goal: Absence or prevention of progression during hospitalization  Description: INTERVENTIONS:  - Assess and monitor for signs and symptoms of infection  - Monitor lab/diagnostic results  - Monitor all insertion sites, i e  indwelling lines, tubes, and drains  - Monitor endotracheal if appropriate and nasal secretions for changes in amount and color  - [place] appropriate cooling/warming therapies per order  - Administer medications as ordered  - Instruct and encourage patient and family to use good hand hygiene technique  - Identify and instruct in appropriate isolation precautions for identified infection/condition  Outcome: Progressing  Goal: Absence of fever/infection during neutropenic period  Description: INTERVENTIONS:  - Monitor WBC    Outcome: Progressing     Problem: SAFETY ADULT  Goal: Patient will remain free of falls  Description: INTERVENTIONS:  - Educate patient/family on patient safety including physical limitations  - Instruct patient to call for assistance with activity   - Consult OT/PT to assist with strengthening/mobility   - Keep Call bell within reach  - Keep bed low and locked with side rails adjusted as appropriate  - Keep care items and personal belongings within reach  - Initiate and maintain comfort rounds  - Make Fall Risk Sign visible to staff  - Offer Toileting every 4 Hours, in advance of need  - Initiate/Maintain bed and chair alarm  - Apply yellow socks and bracelet for high fall risk patients  - Consider moving patient to room near nurses station  Outcome: Progressing  Goal: Maintain or return to baseline ADL function  Description: INTERVENTIONS:  -  Assess patient's ability to carry out ADLs; assess patient's baseline for ADL function and identify physical deficits which impact ability to perform ADLs (bathing, care of mouth/teeth, toileting, grooming, dressing, etc )  - Assess/evaluate cause of self-care deficits   - Assess range of motion  - Assess patient's mobility; develop plan if impaired  - Assess patient's need for assistive devices and provide as appropriate  - Encourage maximum independence but intervene and supervise when necessary  - Involve family in performance of ADLs  - Assess for home care needs following discharge   - Consider OT consult to assist with ADL evaluation and planning for discharge  - Provide patient education as appropriate  Outcome: Progressing  Goal: Maintains/Returns to pre admission functional level  Description: INTERVENTIONS:  - Set and communicate daily mobility goal to care team and patient/family/caregiver     - Collaborate with rehabilitation services on mobility goals if consulted  - Out of bed for toileting  - Record patient progress and toleration of activity level   Outcome: Progressing     Problem: DISCHARGE PLANNING  Goal: Discharge to home or other facility with appropriate resources  Description: INTERVENTIONS:  - Identify barriers to discharge w/patient and caregiver  - Arrange for needed discharge resources and transportation as appropriate  - Identify discharge learning needs (meds, wound care, etc )  - Arrange for interpretive services to assist at discharge as needed  - Refer to Case Management Department for coordinating discharge planning if the patient needs post-hospital services based on physician/advanced practitioner order or complex needs related to functional status, cognitive ability, or social support system  Outcome: Progressing     Problem: Prexisting or High Potential for Compromised Skin Integrity  Goal: Skin integrity is maintained or improved  Description: INTERVENTIONS:  - Identify patients at risk for skin breakdown  - Assess and monitor skin integrity  - Assess and monitor nutrition and hydration status  - Monitor labs   - Assess for incontinence   - Turn and reposition patient  - Assist with mobility/ambulation  - Relieve pressure over bony prominences  - Avoid friction and shearing  - Provide appropriate hygiene as needed including keeping skin clean and dry  - Evaluate need for skin moisturizer/barrier cream  - Collaborate with interdisciplinary team   - Patient/family teaching  - Consider wound care consult   Outcome: Progressing     Problem: Potential for Falls  Goal: Patient will remain free of falls  Description: INTERVENTIONS:  - Educate patient/family on patient safety including physical limitations  - Instruct patient to call for assistance with activity   - Consult OT/PT to assist with strengthening/mobility   - Keep Call bell within reach  - Keep bed low and locked with side rails adjusted as appropriate  - Keep care items and personal belongings within reach  - Initiate and maintain comfort rounds  - Make Fall Risk Sign visible to staff  - Offer Toileting every 4 Hours, in advance of need  - Initiate/Maintain bed and chair alarm  - Apply yellow socks and bracelet for high fall risk patients  - Consider moving patient to room near nurses station  Outcome: Progressing     Problem: Nutrition/Hydration-ADULT  Goal: Nutrient/Hydration intake appropriate for improving, restoring or maintaining nutritional needs  Description: Monitor and assess patient's nutrition/hydration status for malnutrition  Collaborate with interdisciplinary team and initiate plan and interventions as ordered    Monitor patient's weight and dietary intake as ordered or per policy  Utilize nutrition screening tool and intervene as necessary  Determine patient's food preferences and provide high-protein, high-caloric foods as appropriate       INTERVENTIONS:  - Monitor oral intake, urinary output, labs, and treatment plans  - Assess nutrition and hydration status and recommend course of action  - Evaluate amount of meals eaten  - Assist patient with eating if necessary   - Allow adequate time for meals  - Recommend/ encourage appropriate diets, oral nutritional supplements, and vitamin/mineral supplements  - Order, calculate, and assess calorie counts as needed  - Recommend, monitor, and adjust tube feedings and TPN/PPN based on assessed needs  - Assess need for intravenous fluids  - Provide specific nutrition/hydration education as appropriate  - Include patient/family/caregiver in decisions related to nutrition  Outcome: Progressing

## 2022-06-19 NOTE — ASSESSMENT & PLAN NOTE
Acute on chronic L  Monitored outpatient where CTh showed expansion and increased midline shift  Admitted 5/28 for L craniotomy for SDH evacuation and drain placement (drains since removed)  Attempted L MMA embolization, but aborted due to anatomic variation that would not allow for procedure  S/p 7 days Keppra for seizure ppx  Goal SBP < 160  Hold all AP/AC/NSAIDs except DVT Ppx  6/14 CTH with decrease in hematoma size  NSx aware  Outpatient f/u with NSx on 7/7 with CTH 2-3 days prior  Continue PT/OT/SLP

## 2022-06-19 NOTE — PROGRESS NOTES
06/19/22 0700   Pain Assessment   Pain Assessment Tool 0-10   Pain Score No Pain   Restrictions/Precautions   Precautions Bed/chair alarms;Cognitive; Fall Risk;Hard of hearing;Supervision on toilet/commode   Braces or Orthoses   (BP stable this session, did not requrie ABD binder or TEDs)   Lifestyle   Autonomy "Today is Father's day"   Eating   Type of Assistance Needed Set-up / clean-up   Physical Assistance Level No physical assistance   Comment set up to open containers d/t UE carpal tunnel and dec [facility]   Eating CARE Score 5   Oral Hygiene   Type of Assistance Needed Supervision   Physical Assistance Level No physical assistance   Comment Cues to inititate parts of hygiene tasks  This may be d/t encouraging pt to complete more extensive oral hygiene routine here vs what he was completing PTA  Oral Hygiene CARE Score 4   Grooming   Findings Cues to inititate parts of hygiene tasks  This may be d/t encouraging pt to complete more extensive oral hygiene routine here vs what he was completing PTA  Sup while seated  Shower/Bathe Self   Type of Assistance Needed Incidental touching   Physical Assistance Level No physical assistance   Comment SB CGA primarily seated at sink, in stance for jae are/buttocks  Cue for thoroughness w/ washing buttocks  Use of sink for minor steadying assist    Shower/Bathe Self CARE Score 4   Tub/Shower Transfer   Findings Currently no shower order  Pt's BPs stable today, will request shower order from MD for upcoming ADL  Upper Body Dressing   Type of Assistance Needed Physical assistance   Physical Assistance Level 25% or less   Comment set up w/ extra time to don Tshirt  Required Aries overall for buttoning/unbottuning of over shirt which pt requested to wear for warmth  Assist requried d/t tighter fit and for assit w/ buttons     Upper Body Dressing CARE Score 3   Lower Body Dressing   Type of Assistance Needed Physical assistance   Physical Assistance Level 25% or less   Comment [place] requried Aries for don/doff for RLE  Pt attempts but quickly states, "I can't" difficult to provide verbal encouragement d/t [place]   Lower Body Dressing CARE Score 3   Putting On/Taking Off Footwear   Type of Assistance Needed Physical assistance   Physical Assistance Level 25% or less   Comment Able to don/doff LLE sock and shoe, but requried assist to tie shoe  RLE reuqired assist to complete, pt attempts but then states "I can't " Difficult to provide verbal encouragement to complete d/t [place] and cog  Putting On/Taking Off Footwear CARE Score 3   Sit to Stand   Type of Assistance Needed Supervision   Physical Assistance Level No physical assistance   Comment CS no AD and no TEDs/ABD binder   Sit to Stand CARE Score 4   Cognition   Overall Cognitive Status Impaired   Arousal/Participation Alert; Cooperative   Attention Attends with cues to redirect   Orientation Level Oriented X4   Memory Decreased recall of precautions   Following Commands Follows one step commands with increased time or repetition   Comments [place], primarily use of whiteboard for communication   Additional Activities   Additional Activities Comments fxnl mobility in halls of unit to assess BP w/ mobility while trialling no TEDs and no ABD binder  CS provided no AE  BPs remained stable  Refer to vitals flow sheet  Activity Tolerance   Activity Tolerance Patient tolerated treatment well   Assessment   Treatment Assessment OT session focusing on basic ADLs  Completed SB at sink d/t no shower order, Bps stable this session w/o TEDs or ABD binder  Will follow up w/ MD for possibility of shower order  Pt completes bathing dressing CS-Aries  See above for details  Pt continues to progress towards OT goals but continues to require skilled hands on assist at this time  Will benefit from additional IPOT following established POC  Prognosis Good   Problem List Decreased strength; Impaired balance;Decreased mobility; Decreased coordination;Decreased cognition; Impaired judgement;Decreased safety awareness; Impaired hearing;Decreased skin integrity   Plan   Treatment/Interventions ADL retraining;Functional transfer training; Therapeutic exercise; Endurance training;Cognitive reorientation;Patient/family training;Equipment eval/education; Compensatory technique education;Continued evaluation   Recommendation   OT Discharge Recommendation Home with outpatient rehabilitation   OT Therapy Minutes   OT Time In 0700   OT Time Out 0830   OT Total Time (minutes) 90   OT Mode of treatment - Individual (minutes) 90   OT Mode of treatment - Concurrent (minutes) 0   OT Mode of treatment - Group (minutes) 0   OT Mode of treatment - Co-treat (minutes) 0   OT Mode of Treatment - Total time(minutes) 90 minutes   OT Cumulative Minutes 990   Therapy Time missed   Time missed?  No

## 2022-06-19 NOTE — PROGRESS NOTES
Internal Medicine Progress Note  Patient: Wyatt Mckeon  Age/sex: 79 y o  male  Medical Record #: [id number]      ASSESSMENT/PLAN: (Interval History)  Wyatt Mckeon is seen and examined and management for following issues:    SDH s/p Lt craniotomy  · Monitor incisions  · Follow-up with Neurosurgery in 2 weeks  · S/p Keppra for 7 days for seizure prophylaxis  Hyperlipidemia  · Continue statin  · Low cholesterol diet  Urinary retention  · Was placed on flomax on acute side  · dc'd flomax 6/7 d/t orthostasis    Orthostasis  · Encourage fluids  · Off flomax as above  · No longer using TEDs or abdominal binder  · No further dizziness  · Will cont current midodrine    [place]  · Assistive device at bedside    DC planning: reteam    The above assessment and plan was reviewed and updated as determined by my evaluation of the patient on 6/19/2022  Labs: Invalid input(s): LABGLOM, CMP                Review of Scheduled Meds:  Current Facility-Administered Medications   Medication Dose Route Frequency Provider Last Rate    acetaminophen  650 mg Oral Q6H PRN Zeke Henning MD      bisacodyl  10 mg Rectal Daily PRN Zeke Henning MD      docusate sodium  100 mg Oral BID Sanjana Butcher PA-C      heparin (porcine)  5,000 Units Subcutaneous Q8H [facility] Zeke Henning MD      lidocaine   Topical BID PRN Zeke Henning MD      melatonin  6 mg Oral HS Zeke Henning MD      midodrine  2 5 mg Oral Daily With Lunch RONY Jones      midodrine  2 5 mg Oral Daily Sanjana Butcher PA-C      oxyCODONE  5 mg Oral Q4H PRN MD David Patel oxyCODONE  2 5 mg Oral Q4H PRN Zeke Henning MD      polyethylene glycol  17 g Oral Daily PRN Zeke Henning MD      pravastatin  40 mg Oral Daily With Shana Barr MD      senna  1 tablet Oral HS Sanjana Butcher PA-C         Subjective/ HPI: Patient seen and examined   Patients overnight issues or events were reviewed with nursing or staff during rounds or morning huddle session  New or overnight issues include the following:     Pt seen in his room  He states he is doing well  He denies any current complaints  ROS:   A 10 point ROS was performed; negative except as noted above  Imaging:     CT head wo contrast   Final Result by Peterson Meigs, DO (06/15 9075)      Interval evolution and decrease in size of the left-sided subdural collection/hematoma with associated improvement in the mass effect, and left to right midline shift, as described  No other significant interval change  Postsurgical changes and other findings as above  Clinical follow-up recommended  Workstation performed: [id number]             *Labs /Radiology studies Reviewed  *Medications  reviewed and reconciled as needed  *Please refer to order section for additional ordered labs studies  *Case discussed with primary attending during morning huddle case rounds    Physical Examination:  Vitals:   Vitals:    06/19/22 0723 06/19/22 0800 06/19/22 0805 06/19/22 0810   BP: 107/66 123/66 114/67 114/74   BP Location: Left arm Left arm Left arm Left arm   Pulse:       Resp:       Temp:       TempSrc:       SpO2:       Weight:       Height:         GEN: No apparent distress, interactive  NEURO: Alert and oriented x3  HEENT: Pupils are equal and reactive, EOMI, mucous membranes are moist, face symmetrical; extremely [place]  CV: S1 S2 regular, no MRG, no peripheral edema noted  RESP: Lungs are clear bilaterally, no wheezes, rales or rhonchi noted, on room air, respirations easy and non labored  GI: Flat, soft non tender, non distended; +BS x4  : Voiding without difficulty  MUSC: Moves all extremities  SKIN: pale, poor turgor, no evidence of breakdown; incision intact    The above physical exam was reviewed and updated as determined by my evaluation of the patient on 6/19/2022      Invasive Devices  Report    None                    VTE Pharmacologic Prophylaxis: Heparin  Code Status: Level 1 - Full Code  Current Length of Stay: 13 day(s)      Total time spent:  30 minutes with more than 50% spent counseling/coordinating care  Counseling includes discussion with patient re: progress  and discussion with patient of his/her current medical state/information  Coordination of patient's care was performed in conjunction with primary service  Time invested included review of patient's labs, vitals, and management of their comorbidities with continued monitoring  In addition, this patient was discussed with medical team including physician and advanced extenders  The care of the patient was extensively discussed and appropriate treatment plan was formulated unique for this patient  ** Please Note:  voice to text software may have been used in the creation of this document   Although proof errors in transcription or interpretation are a potential of such software**

## 2022-06-20 PROCEDURE — 97530 THERAPEUTIC ACTIVITIES: CPT

## 2022-06-20 PROCEDURE — 99232 SBSQ HOSP IP/OBS MODERATE 35: CPT | Performed by: INTERNAL MEDICINE

## 2022-06-20 PROCEDURE — 99233 SBSQ HOSP IP/OBS HIGH 50: CPT | Performed by: PHYSICAL MEDICINE & REHABILITATION

## 2022-06-20 PROCEDURE — 97112 NEUROMUSCULAR REEDUCATION: CPT

## 2022-06-20 PROCEDURE — 97535 SELF CARE MNGMENT TRAINING: CPT

## 2022-06-20 PROCEDURE — 97110 THERAPEUTIC EXERCISES: CPT

## 2022-06-20 RX ORDER — LANOLIN ALCOHOL/MO/W.PET/CERES
6 CREAM (GRAM) TOPICAL
Qty: 60 TABLET | Refills: 0 | Status: SHIPPED | OUTPATIENT
Start: 2022-06-20 | End: 2022-07-25 | Stop reason: ALTCHOICE

## 2022-06-20 RX ORDER — SENNOSIDES 8.6 MG
8.6 TABLET ORAL
Qty: 15 TABLET | Refills: 0 | Status: SHIPPED | OUTPATIENT
Start: 2022-06-20 | End: 2022-07-25 | Stop reason: ALTCHOICE

## 2022-06-20 RX ORDER — MIDODRINE HYDROCHLORIDE 2.5 MG/1
TABLET ORAL
Qty: 60 TABLET | Refills: 0 | Status: SHIPPED | OUTPATIENT
Start: 2022-06-20 | End: 2022-06-27 | Stop reason: DRUGHIGH

## 2022-06-20 RX ORDER — ACETAMINOPHEN 325 MG/1
650 TABLET ORAL EVERY 6 HOURS PRN
Refills: 0
Start: 2022-06-20

## 2022-06-20 RX ORDER — DOCUSATE SODIUM 100 MG/1
100 CAPSULE, LIQUID FILLED ORAL 2 TIMES DAILY
Qty: 60 CAPSULE | Refills: 0 | Status: SHIPPED | OUTPATIENT
Start: 2022-06-20 | End: 2022-07-25 | Stop reason: ALTCHOICE

## 2022-06-20 RX ADMIN — ACETAMINOPHEN 650 MG: 325 TABLET, FILM COATED ORAL at 07:53

## 2022-06-20 RX ADMIN — HEPARIN SODIUM 5000 UNITS: 5000 INJECTION INTRAVENOUS; SUBCUTANEOUS at 13:56

## 2022-06-20 RX ADMIN — DOCUSATE SODIUM 100 MG: 100 CAPSULE, LIQUID FILLED ORAL at 07:55

## 2022-06-20 RX ADMIN — CARBIDOPA AND LEVODOPA 2.5 MG: 50; 200 TABLET, EXTENDED RELEASE ORAL at 12:06

## 2022-06-20 RX ADMIN — PRAVASTATIN SODIUM 40 MG: 40 TABLET ORAL at 15:56

## 2022-06-20 RX ADMIN — HEPARIN SODIUM 5000 UNITS: 5000 INJECTION INTRAVENOUS; SUBCUTANEOUS at 21:29

## 2022-06-20 RX ADMIN — HEPARIN SODIUM 5000 UNITS: 5000 INJECTION INTRAVENOUS; SUBCUTANEOUS at 05:19

## 2022-06-20 RX ADMIN — DOCUSATE SODIUM 100 MG: 100 CAPSULE, LIQUID FILLED ORAL at 17:18

## 2022-06-20 RX ADMIN — CARBIDOPA AND LEVODOPA 2.5 MG: 50; 200 TABLET, EXTENDED RELEASE ORAL at 07:55

## 2022-06-20 NOTE — PROGRESS NOTES
Internal Medicine Progress Note  Patient: Lei Fortune  Age/sex: 79 y o  male  Medical Record #: [id number]      ASSESSMENT/PLAN: (Interval History)  Lei Fortune is seen and examined and management for following issues:    SDH s/p Lt craniotomy  · Monitor incisions  · Follow-up with Neurosurgery in 2 weeks  · S/p Keppra for 7 days for seizure prophylaxis  Hyperlipidemia  · Continue statin  · Low cholesterol diet  Urinary retention  · Was placed on flomax on acute side  · dc'd flomax 6/7 d/t orthostasis    Orthostasis  · Encourage fluids  · Off flomax as above  · No longer using TEDs or abdominal binder  · No further dizziness  · Will cont current midodrine    [place]  · Assistive device at bedside    DC planning: reteam    The above assessment and plan was reviewed and updated as determined by my evaluation of the patient on 6/20/2022  Labs: Invalid input(s): LABGLOM, CMP                Review of Scheduled Meds:  Current Facility-Administered Medications   Medication Dose Route Frequency Provider Last Rate    acetaminophen  650 mg Oral Q6H PRN Jordan Barr MD      bisacodyl  10 mg Rectal Daily PRN Jordan Barr MD      docusate sodium  100 mg Oral BID Sanjana Butcher PA-C      heparin (porcine)  5,000 Units Subcutaneous Q8H [facility] Jordan Barr MD      lidocaine   Topical BID PRN Jordan Barr MD      melatonin  6 mg Oral HS Jordan Barr MD      midodrine  2 5 mg Oral Daily With Lunch RONY Jones      midodrine  2 5 mg Oral Daily Sanjana Butcher PA-C      oxyCODONE  5 mg Oral Q4H PRN Jordan Barr MD      Or   Manan Limon oxyCODONE  2 5 mg Oral Q4H PRN Jordan Barr MD      polyethylene glycol  17 g Oral Daily PRN Jordan Barr MD      pravastatin  40 mg Oral Daily With Vanda Joseph MD      senna  1 tablet Oral HS Sanjana Butcher PA-C         Subjective/ HPI: Patient seen and examined   Patients overnight issues or events were reviewed with nursing or staff during rounds or morning huddle session  New or overnight issues include the following:     Pt seen in PT  He is looking forward to DC tomorrow  He denies any current complaints  ROS:   A 10 point ROS was performed; negative except as noted above  Imaging:     CT head wo contrast   Final Result by Sadiq Combs DO (06/15 6521)      Interval evolution and decrease in size of the left-sided subdural collection/hematoma with associated improvement in the mass effect, and left to right midline shift, as described  No other significant interval change  Postsurgical changes and other findings as above  Clinical follow-up recommended              Workstation performed: [id number]             *Labs /Radiology studies Reviewed  *Medications  reviewed and reconciled as needed  *Please refer to order section for additional ordered labs studies  *Case discussed with primary attending during morning huddle case rounds    Physical Examination:  Vitals:   Vitals:    06/19/22 1500 06/19/22 2111 06/20/22 0518 06/20/22 0754   BP: 111/58 120/74 116/75 118/74   BP Location: Left arm Left arm Left arm Left arm   Pulse: 97 82 85    Resp: 17 16 16    Temp: 98 °F (36 7 °C) 98 °F (36 7 °C) 98 1 °F (36 7 °C)    TempSrc: Oral Oral Oral    SpO2: 96% 97% 99%    Weight:       Height:         GEN: No apparent distress, interactive  NEURO: Alert and oriented x3  HEENT: Pupils are equal and reactive, EOMI, mucous membranes are moist, face symmetrical; extremely [place]  CV: S1 S2 regular, no MRG, no peripheral edema noted  RESP: Lungs are clear bilaterally, no wheezes, rales or rhonchi noted, on room air, respirations easy and non labored  GI: Flat, soft non tender, non distended; +BS x4  : Voiding without difficulty  MUSC: Moves all extremities  SKIN: pale, poor turgor, no evidence of breakdown; incision intact    The above physical exam was reviewed and updated as determined by my evaluation of the patient on 6/20/2022  Invasive Devices  Report    None                    VTE Pharmacologic Prophylaxis: Heparin  Code Status: Level 1 - Full Code  Current Length of Stay: 14 day(s)      Total time spent:  30 minutes with more than 50% spent counseling/coordinating care  Counseling includes discussion with patient re: progress  and discussion with patient of his/her current medical state/information  Coordination of patient's care was performed in conjunction with primary service  Time invested included review of patient's labs, vitals, and management of their comorbidities with continued monitoring  In addition, this patient was discussed with medical team including physician and advanced extenders  The care of the patient was extensively discussed and appropriate treatment plan was formulated unique for this patient  ** Please Note:  voice to text software may have been used in the creation of this document   Although proof errors in transcription or interpretation are a potential of such software**

## 2022-06-20 NOTE — PROGRESS NOTES
06/20/22 0700   Pain Assessment   Pain Assessment Tool 0-10   Pain Score 8   Pain Location/Orientation   (headache)   Restrictions/Precautions   Precautions Cognitive; Fall Risk;Hard of hearing   Weight Bearing Restrictions No   ROM Restrictions No   Eating   Type of Assistance Needed Set-up / clean-up   Physical Assistance Level No physical assistance   Comment spouse cuts up food items as she did PTA; pt feeds self using metal silverware   Eating CARE Score 5   Oral Hygiene   Type of Assistance Needed Independent   Physical Assistance Level No physical assistance   Comment in stance at sink to brush teeth   Oral Hygiene CARE Score 6   Shower/Bathe Self   Type of Assistance Needed Supervision   Physical Assistance Level No physical assistance   Comment seated and in stance to complete shower routine bathing 10/10 parts with recommendation of supervision at time of d/c for safety  Shower/Bathe Self CARE Score 4   Bathing   Assessed Bath Style Shower   Anticipated D/C Bath Style Shower   Able to [place] Yes   Able to [employer] Temperature Yes   Able to Wash/Rinse/Dry (body part) Left Arm;Right Arm;L Upper Leg;R Upper Leg;L Lower Leg/Foot;R Lower Leg/Foot;Chest;Abdomen;Perineal Area; Buttocks   Limitations Noted in Balance;ROM;Strength   Positioning Seated;Standing   Adaptive Equipment Shower Bars; Shower Seat;Hand [employer]   Limitations Noted In Balance; Coordination;ROM;LE Strength   Adaptive Equipment Grab Bars;Seat with Back   Assessed Shower   Findings SUP for wet transfer in/out of shower   Upper Body Dressing   Type of Assistance Needed Independent   Physical Assistance Level No physical assistance   Comment to retrieve and don shirt   Upper Body Dressing CARE Score 6   Lower Body Dressing   Type of Assistance Needed Independent   Physical Assistance Level No physical assistance   Comment to retrieve clothing; pt francheska's good insight to safety and sits to thread underwear/pants over feet, stands with IND to complete CM   Lower Body Dressing CARE Score 6   Putting On/Taking Off Footwear   Type of Assistance Needed Physical assistance   Physical Assistance Level 25% or less   Comment pt able to don/doff socks using cross leg tech, dons shoes with shoe horn, requires assist to tie shoe laces; spouse was assisting with this PTA   Putting On/Taking Off Footwear CARE Score 3   Sit to Stand   Type of Assistance Needed Independent   Physical Assistance Level No physical assistance   Comment no AD   Sit to Stand CARE Score 6   Bed-Chair Transfer   Type of Assistance Needed Independent   Physical Assistance Level No physical assistance   Comment no AD   Chair/Bed-to-Chair Transfer CARE Score 6   Toileting Hygiene   Type of Assistance Needed Independent   Physical Assistance Level No physical assistance   Comment no AD in stance for hygiene/CM   Isaiah Toddens Tomasi 83 Score 6   Toilet Transfer   Type of Assistance Needed Independent   Physical Assistance Level No physical assistance   Comment to standard toilet; spouse reports not needing BSC at this time   Toilet Transfer CARE Score 6   Kitchen Mobility   Kitchen-Mobility Level   (no AD)   Kitchen Activity Retrieve items;Transport items   Kitchen Mobility Comments pt completes simple kitchen mobility simulating snack/drink retrieval at an overall IND level demo'ing good insight to safety during func reach to fridge/freezer and lower cabinets  Health Management   Health Management pt and spouse report that spouse was assisting with placing cholesterol medication on counter top PTA, spouse reports she will be able to assist with setting out medications as needed at time of d/c  Cognition   Overall Cognitive Status Impaired   Arousal/Participation Alert; Cooperative   Attention Attends with cues to redirect   Orientation Level Oriented X4   Memory Decreased recall of precautions   Following Commands Follows one step commands with increased time or repetition   Activity Tolerance   Activity Tolerance Patient tolerated treatment well   Assessment   Treatment Assessment pt engages in 90 minute skilled OT session focusing on D/C ADL routine in shower, func transfers w/o AD, kitchen mobility and medication management  see above for full func details  pt francheska's great progress since IE progressing to supervision/IND level for self care/transfers  pt completes shower routine seated/standing at an overall supervision level and dressing/toileting at an IND level (except for shoe lace management which pt was completing PTA)  all basic transfers completed at IND level except for shower transfer which recommendation is for supervision at time of d/c  spouse present towards end of session and reviewed medications and plan for spouse to continue to place on counter top as needed throughout the day, as she was PTA  spouse was completing all meal prep PTA, however did review kitchen mobility for snack/drink retrieval which pt completed w/o AD and at an IND level  pt has all necessary DME for d/c home with support/assist from spouse with recommendation of OP OT services  Prognosis Good   Problem List Decreased strength; Impaired balance;Decreased mobility; Decreased coordination;Decreased cognition; Impaired judgement;Decreased safety awareness; Impaired hearing;Decreased skin integrity   Barriers to Discharge Inaccessible home environment;Decreased caregiver support   Plan   Treatment/Interventions ADL retraining;Functional transfer training; Therapeutic exercise; Endurance training;Cognitive reorientation;Patient/family training;Equipment eval/education; Compensatory technique education   OT Therapy Minutes   OT Time In 0700   OT Time Out 0830   OT Total Time (minutes) 90   OT Mode of treatment - Individual (minutes) 90   OT Mode of treatment - Concurrent (minutes) 0   OT Mode of treatment - Group (minutes) 0   OT Mode of treatment - Co-treat (minutes) 0   OT Mode of Treatment - Total time(minutes) 90 minutes   OT Cumulative Minutes 1080   Therapy Time missed   Time missed?  No

## 2022-06-21 VITALS
HEIGHT: 71 IN | TEMPERATURE: 98.4 F | DIASTOLIC BLOOD PRESSURE: 79 MMHG | SYSTOLIC BLOOD PRESSURE: 145 MMHG | OXYGEN SATURATION: 97 % | BODY MASS INDEX: 23.4 KG/M2 | RESPIRATION RATE: 18 BRPM | HEART RATE: 95 BPM | WEIGHT: 167.11 LBS

## 2022-06-21 PROCEDURE — 99239 HOSP IP/OBS DSCHRG MGMT >30: CPT | Performed by: PHYSICAL MEDICINE & REHABILITATION

## 2022-06-21 PROCEDURE — 99232 SBSQ HOSP IP/OBS MODERATE 35: CPT | Performed by: INTERNAL MEDICINE

## 2022-06-21 RX ADMIN — HEPARIN SODIUM 5000 UNITS: 5000 INJECTION INTRAVENOUS; SUBCUTANEOUS at 05:03

## 2022-06-21 RX ADMIN — CARBIDOPA AND LEVODOPA 2.5 MG: 50; 200 TABLET, EXTENDED RELEASE ORAL at 11:49

## 2022-06-21 RX ADMIN — DOCUSATE SODIUM 100 MG: 100 CAPSULE, LIQUID FILLED ORAL at 08:51

## 2022-06-21 RX ADMIN — ACETAMINOPHEN 650 MG: 325 TABLET, FILM COATED ORAL at 06:36

## 2022-06-21 RX ADMIN — CARBIDOPA AND LEVODOPA 2.5 MG: 50; 200 TABLET, EXTENDED RELEASE ORAL at 08:51

## 2022-06-21 NOTE — NURSING NOTE
Patient discharged home with wife  AVS reviewed with patients wife and daughter  All questions answered  Patient transfers and ambulates independently  He is continent of bowel and bladder

## 2022-06-21 NOTE — CASE MANAGEMENT
Team discharge summary:    Pt made good rehab progress and returned home with his spouse  He will receive continued outpt PT and OT at [facility]  He had no DME needs at d/c  Pt's wife and dtr were present for d/c instructions and are aware of pt's functional ability

## 2022-06-21 NOTE — DISCHARGE SUMMARY
Discharge Summary - Jameel Guzman 79 y o  male MRN: [id number]  Unit/Bed#: [facility] 189-13 Encounter: [id number]    Admission Date: 6/6/2022     Discharge Date: 06/21/2022  Etiologic/Rehabilitation Diagnosis: Impairment of mobility, safety and Activities of Daily Living (ADLs) due to Brain Dysfunction:  02 22  Traumatic, Closed Injury    HPI: Patient is a 80 yo male with h/o fall and L SDH which was being monitored by neurosx with serial CTH however patient's recent OP CTH showed expansion of L SDH with increasing midline shift therefore patient underwent L craniotomy for SDH evacuation on 5/28 by Dr Alicia GONZALEZ L MMA embolization was attempted as well however was aborted due to anatomic variation that would not allow for the procedure  Post-op uncomplicated and he was admitted to the [facility] on 6/6    Procedures Performed During [facility] Admission: None    Acute Rehabilitation Center Course: Patient participated in a comprehensive interdisciplinary inpatient rehabilitation program which included involvment of MD, therapies (PT, OT, and/or SLP), RN, CM, SW, dietary, and psychology services  He was able to be advanced to a modified independent level of assist and considered safe for discharge home  Please see below for patient's day to day management of rehabilitation needs  Please refer to Internal Medicine notes during [facility] stay for day to day management of patient's medical co-morbidities  * SDH (subdural hematoma) (HCC)  Assessment & Plan  Acute on chronic L  Monitored outpatient where CTh showed expansion and increased midline shift  Admitted 5/28 for L craniotomy for SDH evacuation and drain placement (drains since removed)  Attempted L MMA embolization, but aborted due to anatomic variation that would not allow for procedure  S/p 7 days Keppra for seizure ppx  Goal SBP < 160  Hold all AP/AC/NSAIDs except DVT Ppx  6/14 CTH with decrease in hematoma size  NSx aware     Outpatient f/u with NSx on 7/7 with CTH 2-3 days prior  Continue PT/OT/SLP  EKG abnormalities  Assessment & Plan  Incidentally noted to have wide QRS and RBBB  Recommend outpatient f/u with PCP and Cardiology  Diastolic dysfunction  Assessment & Plan  Noted incidentally on echo   G1DD with abnormal septal motion  Recommend outpatient f/u with PCP and Cardiology for further work-up  Hypotension  Assessment & Plan  On midodrine to 2 5mg BID (before breakfast and lunch)  TEDs/abdominal binder were discontinued  Monitor  Moderate protein-calorie malnutrition ([address] )  Assessment & Plan  Malnutrition Findings:   Adult Malnutrition type: Acute illness  Adult Degree of Malnutrition: Malnutrition of moderate degree  Malnutrition Characteristics: Weight loss, Fat loss     On reg/thin diets  Appetite is fine  Nutrition following  Outpatient f/u with PCP                  360 Statement: continue to monitor intake/weight    BMI Findings: Body mass index is 23 31 kg/m²  Hypercholesterolemia  Assessment & Plan  Therapeutic substitution for home Zocor 20mg QPM        Discharge Physical Examination:  /79   Pulse 95   Temp 98 4 °F (36 9 °C) (Oral)   Resp 18   Ht 5' 11" (1 803 m)   Wt 75 8 kg (167 lb 1 7 oz)   SpO2 97%   BMI 23 31 kg/m²     Gen: No acute distress, Well-nourished, well-appearing  HEENT: Moist mucus membranes, Normocephalic/Atraumatic  Very hard of hearing, pocket talker helps  Cardiovascular: Regular rate, rhythm, S1/S2  Distal pulses palpable  Heme/Extr: No edema  Pulmonary: Non-labored breathing  Lungs CTAB  : No brooks  GI: Soft, non-tender, non-distended  BS+  MSK:  No effusions  Arthritic changes in hands  Bulk is symmetric  See below for MMT scores  Integumentary: Skin is warm, dry  Neuro: AAOx3, CN 2-12 intact except for poor hearing  Speech is intact  Appropriate to questioning  Tone is normal  No ataxia/dysmetria with finger to nose     MMT:   Strength: * decreased AROM in R shoulder, but good strength against resistance  Right  Left  Site  Right  Left  Site    4* 5  S Ab: Shoulder Abductors  5  5  HF: Hip Flexors    5 5  EF: Elbow Flexors  5  5 KF: Knee Flexors    5- 5  EE: Elbow Extensors  5  5  KE: Knee Extensors    5  5  WE: Wrist Extensors  5  5  DR: Dorsi Flexors    5  5  FF: Finger Flexors  5  5  PF: Plantar Flexors    5  5  HI: Hand Intrinsics  5  5  EHL: Extensor Hallucis Longus   Psych: Normal mood and affect  Significant Findings, Care, Treatment and Services Provided: Acute comprehensive interdisciplinary inpatient rehabilitation including PT, OT, SLP, RN, CM, SW, dietary, psychology, etc     Complications: None    Functional Status Upon Admission to ARC:  Transfers: mod   Mobility: min   ADLs: min-mod     Functional Status Upon Discharge from ARC:   PT:  ind for transfers, bed mobility, and most ambulation, but requires supervision for 150', uneven surfaces, and stairs  OT: Set-up eating, Ind oral hygiene, Sup shower/bathe self, Ind LB dressing, CGA footwear, Ind toileting hygiene, Ind toilet transfer   SLP: Sup for comprehension, expression, problem solving, and memory, mod Ind with extra time for social interaction  Discharge Diagnosis: Impairment of mobility, safety and Activities of Daily Living (ADLs) due to Brain Dysfunction:  02 22  Traumatic, Closed Injury    Discharge Medications:   See after visit summary for reconciled discharge medications provided to patient and family  Condition at Discharge: good     Discharge instructions/Information to patient and family:   See after visit summary for information provided to patient and family  Provisions for Follow-Up Care:  See after visit summary for information related to follow-up care and any pertinent home health orders        Future Appointments   Date Time Provider Laura Rene   6/24/2022  8:00 AM SAMMY Sierra PT [facility]   7/1/2022  8:45 AM CA CT 1 CA [facility]   7/6/2022  9:00 AM Mariama Coley DPM POD [facility]   7/7/2022  2:45 PM Hubert Montano MD NEURO [facility]   8/18/2022  9:45 AM Augustina Castañeda MD [address] [place] [address] ENT  [address]   8/23/2022  9:15 AM Phuc Betancourt PA-C [facility]  ORL   8/23/2022  9:30 AM Sekou Lambert [facility]  ORL   8/23/2022 12:45 PM Ki Izaguirre DO [facility]       Disposition: Home with Outpatient PT/OT/SLP at [facility]    Planned Readmission: No    Discharge Statement   I spent 45 minutes discharging the patient  This time was spent on the day of discharge  I had direct contact with the patient on the day of discharge  Greater than 50% of the total time was spent examining patient, answering all patient questions, arranging and discussing plan of care with patient as well as directly providing post-discharge instructions  Additional time then spent on discharge activities  Discharge Medications:  See after visit summary for reconciled discharge medications provided to patient and family        Facility Administered Medications Prior to Discharge:    Current Facility-Administered Medications   Medication Dose Route Frequency Provider Last Rate    acetaminophen  650 mg Oral Q6H PRN Zeke Henning MD      bisacodyl  10 mg Rectal Daily PRN Zeke Henning MD      docusate sodium  100 mg Oral BID Sanjana Butcher PA-C      heparin (porcine)  5,000 Units Subcutaneous Q8H [address] Zeke Henning MD      lidocaine   Topical BID PRN Zeke Henning MD      melatonin  6 mg Oral HS Zeke Henning MD      midodrine  2 5 mg Oral Daily With Lunch RONY Jones      midodrine  2 5 mg Oral Daily Sanjana uBtcher PA-C      oxyCODONE  5 mg Oral Q4H PRN MD David Patel oxyCODONE  2 5 mg Oral Q4H PRN Zeke Henning MD      polyethylene glycol  17 g Oral Daily PRN Zeke Henning MD      pravastatin  40 mg Oral Daily With Shana Barr MD      senna  1 tablet Oral HS Sanjana Butcher PA-C

## 2022-06-21 NOTE — PROGRESS NOTES
Internal Medicine Progress Note  Patient: Sherine Jacinto  Age/sex: 79 y o  male  Medical Record #: [id number]      ASSESSMENT/PLAN: (Interval History)  Sherine Jacinto is seen and examined and management for following issues:    SDH s/p Lt craniotomy  · Monitor incisions  · Follow-up with Neurosurgery as scheduled  · S/p Keppra for 7 days for seizure prophylaxis  Hyperlipidemia  · Continue statin  · Low cholesterol diet  Urinary retention  · Was placed on flomax on acute side  · dc'd flomax 6/7 d/t orthostasis    Orthostasis  · Encourage fluids  · Off flomax as above  · No longer using TEDs or abdominal binder  · No further dizziness  · Will cont current midodrine    [place]  · Assistive device at bedside    DC planning: DC today ok from medicine standpoint    The above assessment and plan was reviewed and updated as determined by my evaluation of the patient on 6/21/2022  Labs: Invalid input(s): LABBRIDGETTE, CMP                Review of Scheduled Meds:  Current Facility-Administered Medications   Medication Dose Route Frequency Provider Last Rate    acetaminophen  650 mg Oral Q6H PRN Tanya Puri MD      bisacodyl  10 mg Rectal Daily PRN Tanya Puri MD      docusate sodium  100 mg Oral BID Sanjana Butcher PA-C      heparin (porcine)  5,000 Units Subcutaneous Q8H [address] Tanya Puri MD      lidocaine   Topical BID PRJEAN PAUL Puri MD      melatonin  6 mg Oral HS Tanya Puri MD      midodrine  2 5 mg Oral Daily With Lunch RONY Jones      midodrine  2 5 mg Oral Daily Sanjana Butcher PA-C      oxyCODONE  5 mg Oral Q4H PRMD David Jacinto oxyCODONE  2 5 mg Oral Q4H PRJEAN PAUL Puri MD      polyethylene glycol  17 g Oral Daily PRJEAN PAUL Puri MD      pravastatin  40 mg Oral Daily With Navin Tapia MD      senna  1 tablet Oral HS Sanjana Butcher PA-C         Subjective/ HPI: Patient seen and examined   Patients overnight issues or events were reviewed with nursing or staff during rounds or morning huddle session  New or overnight issues include the following:     Pt seen at bedside  Family present as well, ready for dc to home      ROS:   A 10 point ROS was performed; negative except as noted above  Imaging:     CT head wo contrast   Final Result by Sri Zuniga DO (06/15 6955)      Interval evolution and decrease in size of the left-sided subdural collection/hematoma with associated improvement in the mass effect, and left to right midline shift, as described  No other significant interval change  Postsurgical changes and other findings as above  Clinical follow-up recommended  Workstation performed: [id number]             *Labs /Radiology studies Reviewed  *Medications  reviewed and reconciled as needed  *Please refer to order section for additional ordered labs studies  *Case discussed with primary attending during morning huddle case rounds    Physical Examination:  Vitals:   Vitals:    06/20/22 1005 06/20/22 1500 06/20/22 2126 06/21/22 0831   BP: 103/59 103/59 127/63 145/79   BP Location: Right arm Left arm Left arm    Pulse: 99 83 80 95   Resp:  20 18 18   Temp:  97 8 °F (36 6 °C) 98 1 °F (36 7 °C) 98 4 °F (36 9 °C)   TempSrc:  Oral Oral Oral   SpO2:  97% 97% 97%   Weight:       Height:         GEN: No apparent distress, interactive  NEURO: Alert and oriented x3  HEENT: Pupils are equal and reactive, EOMI, mucous membranes are moist, face symmetrical; [place]  CV: S1 S2 regular, no MRG, no peripheral edema noted  RESP: Lungs are clear bilaterally, no wheezes, rales or rhonchi noted, on room air, respirations easy and non labored  GI: Flat, soft non tender, non distended; +BS x4  : Voiding without difficulty  MUSC: Moves all extremities  SKIN: pink, warm and dry, normal turgor, incision intact        The above physical exam was reviewed and updated as determined by my evaluation of the patient on 6/21/2022      Invasive Devices  Report    None                    VTE Pharmacologic Prophylaxis: Heparin  Code Status: Level 1 - Full Code  Current Length of Stay: 15 day(s)      Total time spent:  30 minutes with more than 50% spent counseling/coordinating care  Counseling includes discussion with patient re: progress  and discussion with patient of his/her current medical state/information  Coordination of patient's care was performed in conjunction with primary service  Time invested included review of patient's labs, vitals, and management of their comorbidities with continued monitoring  In addition, this patient was discussed with medical team including physician and advanced extenders  The care of the patient was extensively discussed and appropriate treatment plan was formulated unique for this patient  ** Please Note:  voice to text software may have been used in the creation of this document   Although proof errors in transcription or interpretation are a potential of such software**

## 2022-06-21 NOTE — TEAM CONFERENCE
Acute RehabilitationTeam Conference Note  Date: 6/21/2022   Time: 10:58 AM       Patient Name:  Seb Banks       Medical Record Number: [id number]   YOB: 1940  Sex:  Male          Room/Bed:  /[facility] 459-01  Payor Info:  Payor: MEDICARE / Plan: MEDICARE A AND B / Product Type: Medicare A & B Fee for Service /      Admitting Diagnosis: Acute on chronic intracranial subdural hematoma ([facility] ) [I62 01, I62 03]   Admit Date/Time:  6/6/2022  2:13 PM  Admission Comments: No comment available     Primary Diagnosis:  SDH (subdural hematoma) (HCC)  Principal Problem: SDH (subdural hematoma) (HCC)    Patient Active Problem List    Diagnosis Date Noted    Moderate protein-calorie malnutrition ([facility] ) 06/19/2022    Hypotension 48/15/0528    Diastolic dysfunction 31/26/8930    EKG abnormalities 06/19/2022    Constipation 06/02/2022    SDH (subdural hematoma) ([place] [address] ) 05/27/2022    Primary osteoarthritis of left knee 05/17/2022    Hygroma 04/26/2022    Right hand pain     Carpal tunnel syndrome on right     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[facility] ) 09/02/2021    Overweight (BMI 25 0-29 9) 01/27/2021    Negative depression screening 09/26/2019    Medicare annual wellness visit, subsequent 03/20/2019    Hypercholesterolemia 07/12/2018    Borderline hypertension 07/12/2018    Hearing loss 04/08/2009       Physical Therapy:    Weight Bearing Status: Full Weight Bearing  Transfers: Independent  Bed Mobility: Independent  Amulation Distance (ft): 500 feet  Ambulation: Supervision, Independent (Independent up to 50ft; requires supervision for community ambulation distances)  Assistive Device for Ambulation:  (no AD)  Number of Stairs: 12 (FF)  Assistive Device for Stairs: Right Hand Rail  Stair Assistance: Supervision  Ramp: Supervision  Assistive Device for Ramp: None  Discharge Recommendations: Home Independently  [address] with[de-identified] Outpatient Physical Therapy, 24 Hour Supervision, Family Support    6/20/22  Pt requiring Supervision for stairs and community level ambulation  He is independent with household distances and has been progressed to IRP during the day time, A/S/GH at night  He cont to be limited by dec balance, dec coordination, dec gait speed, dec endurance, and extreme hearing impairment  Pt is planned d/c 6/21 to home with OPPT services to cont to improve these impairments  He has met all of his goals  Pt wife, Ralf to provide supervision for stairs, and community ambulation  She has no concerns at this time and reports she has family who can provide increased assist as needed  Yasmin Chacon, SPT  Co-signed by Cheryl Foy DPT        Occupational Therapy:  Eating: Supervision (set up)  Grooming: Independent  Bathing: Supervision  Bathing: Supervision  Upper Body Dressing: Independent  Lower Body Dressing: Minimal Assistance (for tying shoe laces; spouse completed PTA)  Toileting: Independent  Tub/Shower Transfer: Supervision  Toilet Transfer: Independent  Cognition: Exceptions to WNL  Cognition: Decreased Memory  Orientation: Person, Place, Time, Situation  Discharge Recommendations: Home with:  [address] [facility] [place] with[de-identified] Family Support, Outpatient Occupational Therapy       Pt completing functional transfers at an IND level w/o AD except for shower transfers which is recommended pt have supervision  IND for UE dressing and Min A with LB dressing for shoe lace management only, which spouse was completing PTA  Pt scheduled for d/c home tomorrow 6/21/22 with support from spouse and recommendation of OP OT services  Pt has all necessary DME for d/c home  Speech Therapy:           ST orders received with plans to complete cognitive linguistic/language evaluation on 6/7/22  Evaluation results and recommendations to follow       Update from week 6/13/2022: Pt completed the Informal Language Assessment as well a portions of the Informal Cognitive assessment Overall pt is demonstrating minimal deficits noted in overall verbal comprehension given significant [place] deficit to which pocket talker was initially used but verbal presentation given information was decreased in pt's ability to respond accurately or appropriately  SLP did utilize written expression for all structured question and tasks presented as result of hearing deficit  Pt did appear to have fairly good comprehension given information BUT noted to have slower processing when answering multiple questions  As for pt's speech output, pt noted to gesture numbers and verbalize short 1-2 word answers  Pt did state to SLP about how he feels his "speech" is not good despite pt's ability to complete object naming, responsive naming, automatic speech tasks w/o difficulty  When attempting to have pt verbalize longer utterances, that is when it was noted that pt did demonstrate slower speech output, prolongation given sounds for words, etc  In regards to basic functional cognitive skills, pt was demonstrating good insight to problem solving, reasoning, organization  Overall, suspect that his cognitive linguistic skills are fairly close to prior level of functioning, but [place] status impacts abilities for certain tasks at this time  Continued use of pocket talker has been used across therapies but pt does consistently state that "I can't hear you" even w/ use  When using white board, pt's comprehension and ability to complete tasks improve  Spoke w/ pt's wife at the end of session, providing update in regards to assessment, to which she does report that they tend to use their own "sign language" to communicate at home as pt's hearing has worsened over the years   SLP providing update in adequate comprehension skills but slower processing noted when answering questions, etc  Additionally, stated to wife that while LTM recall was functional, suspect there will be decreased ST memory and difficulty in completing new learning tasks, skills which will be introduced while in the acute rehab center  Focus has been towards eliciting more speech output as pt has been relying on 1-2 word responses, in which pt can elaborate more expression for describing pictures given increased time  Functional level for pt at this time are supervision level for both language and cognitive skills  Currently, pt is at prior level of functioning for cognitive linguistic skills as pt's wife is available and does assist w/ most I ADL tasks and will continue to assist at time of discharge  No further skilled ST services warranted at this time while on the ARC and at time of discharge but will benefit from ongoing skilled OT/PT services to maximize overall functional mobility skills  Nursing Notes:  Appetite: Good  Diet Type: Regular/House                      Diet Patient/Family Education Complete: Yes                            Bladder: Continent     Bladder Patient/Family Education: Yes  Bowel: Continent     Bowel Patient/Family Education: Yes  Pain Location/Orientation: Location: Head  Pain Score: 0                       Hospital Pain Intervention(s): Medication (See MAR)  Pain Patient/Family Education: Yes  Medication Management/Safety  Injectable:  (heparin)  Safe Administration: Yes (by staff)  Medication Patient/Family Education Complete: No (on going)    SDH s/p Lt craniotomy - Monitor incisions  , Follow-up with Neurosurgery in 2 weeks, Keppra for 7 days for seizure prophylaxis  Hyperlipidemia - Continue statin, Low cholesterol diet  Urinary retention Was placed on flomax on acute side Now with orthostasis dc'd flomax 6/7  Orthostasis Encourage fluids Off flomax as above Despite thigh high teds/abdominal binder c/w orthostasis Add low dose midodrine in the am and prn dose at lunch time orthostasis improved with midodrine  [place] Assistive device at bedside  Pt is continent of both bowel and bladder  Pt requires alarms for safety  This week we will monitor lab values and vital signs    We will educate on the importance of turning and offloading in order to prevent skin breakdown and preform routine skin checks  We will encourage independence with ADLs  We will preform safe transfers and keep the pt free from falls  We will monitor for constipation and medicate per bowel protocol  Case Management:     Discharge Planning  Living Arrangements: Lives w/ Spouse/significant other  Support Systems: Spouse/significant other, Children  Assistance Needed: n/a  Type of Current Residence: Private residence  Current Home Care Services: No  6/14/22    He lives with his wife in a two story home  He stays on the first floor with a first floor bedroom and bathroom with a walk in shower  No DME is available for use  He has had outpt therapy for carpal tunnel at [facility]  He uses [address] [place]  Reviewed team meeting process and potential LOS  Following to assist w/ d/c planning needs  6/21/22  Pt is scheduled for d/c today with outpt PT and OT through [facility]  Following to assist w/ d/c planning needs  Is the patient actively participating in therapies? yes  List any modifications to the treatment plan:     Barriers Interventions   All functional have been resolved  Is the patient making expected progress toward goals?  YES  List any update or changes to goals:     Medical Goals: Patient will be medically stable for discharge to [facility] upon completion of rehab program and Patient will be able to manage medical conditions and comorbid conditions with medications and follow up upon completion of rehab program    Weekly Team Goals:   Rehab Team Goals  ADL Team Goal: Patient will require supervision with ADLs with least restrictive device upon completion of rehab program  Bowel/Bladder Team Goal: Patient will require assist with bladder/bowel management with least restrictive device upon completion of rehab program  Transfer Team Goal: Patient will be independent with transfers with least restrictive device upon completion of rehab program  Locomotion Team Goal: Patient will require supervision with locomotion with least restrictive device upon completion of rehab program  Cognitive Team Goal: Patient will require supervision for basic and complex tasks upon completion of rehab program    Discussion: Pt presents with the above barriers  Pt is scheduled for d/c today with outpt PT and OT at [facility]  Anticipated Discharge Date:  6/21  [facility] Team Members Present: The following team members are supervising care for this patient and were present during this Weekly Team Conference      Physician: Dr Sam Reno MD  : LENCHO Valdez  Registered Nurse: Faisal Galdamez RN  Physical Therapist: John Lyman DPT  Occupational Therapist: Bhavna Pearson OTR/L  Speech Therapist: Sendy Vivas, [address] [place], [place] [address], [address] [place] [address]

## 2022-06-22 NOTE — OCCUPATIONAL THERAPY NOTE
[facility] OT DC SUMMARY    Pt admitted to [facility] on 6/6/22 requiring up to Ax2 for basic ADLs d/t orthostatic Bps  Pt DC home 6/21/22 at sup-Mod I but requiring Aries for footwear  FT successfully completed w/ pts wife  No AE/DME needs at this time  Pt to continue w/ OP OT  Pt made significant fxnl progress w/ IPOT during his stay

## 2022-06-22 NOTE — SPEECH THERAPY NOTE
SLP Discharge Summary    Pt was discharged home w/ family support/supervision on 6/21/2022  By the time of discharge, pt had already achieved goals as SLP had established on initial assessment  While on the [facility] rehab center, primary focus of ST session was towards eliciting increased speech output as well as monitoring cognitive linguistic skills  Pt completed the Informal Language Assessment as well a portions of the Informal Cognitive assessment  Overall pt was demonstrating minimal deficits noted in overall verbal comprehension given significant [place] deficit to which pocket talker was initially used but verbal presentation given information was decreased in pt's ability to respond accurately or appropriately  SLP utilized written expression for all structured question and tasks presented as result of hearing deficit  Pt  appeared to have fairly good comprehension given information BUT noted to have slower processing when answering multiple questions  As for pt's speech output, pt noted to gesture numbers and verbalize short 1-2 word answers  Pt had stated to SLP about how he feels his "speech" is not good despite pt's ability to complete object naming, responsive naming, automatic speech tasks w/o difficulty  When attempting to have pt verbalize longer utterances, that is when it was noted that pt did demonstrate slower speech output, prolongation given sounds for words, etc  In regards to basic functional cognitive skills, pt was demonstrating good insight to problem solving, reasoning, organization  Overall, suspect that his cognitive linguistic skills are fairly close to prior level of functioning, but [place] status impacts abilities for certain tasks at this time  Continued use of pocket talker has been used across therapies but pt does consistently state that "I can't hear you" even w/ use  When using white board, pt's comprehension and ability to complete tasks improve   Spoke w/ pt's wife at the end of session, providing update in regards to assessment, to which she does report that they tend to use their own "sign language" to communicate at home as pt's hearing has worsened over the years  SLP providing update in adequate comprehension skills but slower processing noted when answering questions, etc  Additionally, stated to wife that while LTM recall was functional, suspect there will be decreased ST memory and difficulty in completing new learning tasks, skills which will be introduced while in the acute rehab center  Focus has been towards eliciting more speech output as pt has been relying on 1-2 word responses, in which pt can elaborate more expression for describing pictures given increased time  Functional level for pt at this time are supervision level for both language and cognitive skills  Currently, pt was supervision level for comprehension, expression, social interaction, executive function skills and memory  Pt's wife is available and does assist w/ most I ADL tasks and will continue to assist at time of discharge  No further skilled ST services warranted at time of discharge but will benefit from ongoing skilled OT/PT services to maximize overall functional mobility skills

## 2022-06-23 NOTE — PROGRESS NOTES
06/23/22 1456   Hello, [Guardians Name / Berta Morton, this is [Caller Star Gallego from [facility], and our clinical care team wanted to check on you / your child after your recent visit to the hospital  It will only take 3-5 minutes  Is this a good time? Discharge Call Type/ Specific Diagnosis: General Call   General Discharge Phone Call   Were your/your child's discharge instructions clear and understandable? Please tell me in your own words how to care for yourself/your child now that you're home Yes;Patient understood instructions   Have you filled your/your child's new prescriptions yet? Yes   What questions do you have about those medications? No questions   Are you/your child having any unusual symptoms or problems? (Specific to problem- i e , dressing, pain, bruising or swelling, procedure, etc ) No reported symptoms/problems   Do you have follow up appointment with your/your child's physician? Yes   Is there anything preventing you from keeping that appointment? No;Patient able to keep appointment   Are there any physicians, nurses, or hospital staff you would like us to recognize for doing a very good job? Nurse;PCA/Tech;PT/OT/RT/SLP;Nutrition/Food Services; Case Management;Physician  (Everyone was wonderful and kind  Great job!)   Thank you for taking the time to share with me about your care and recovery  Do you have any suggestions for us? No   This call resulted in: No interventions needed   Call Complete   Attempted Number of Calls 1   Discharge phone call complete? Complete   Hi, This is ________ from [facility]  This is just a courtesy call, and there is no need to call us back  Have a great day     (Called by [address])

## 2022-06-24 ENCOUNTER — EVALUATION (OUTPATIENT)
Dept: PHYSICAL THERAPY | Facility: CLINIC | Age: 82
End: 2022-06-24
Payer: MEDICARE

## 2022-06-24 DIAGNOSIS — R26.9 GAIT ABNORMALITY: ICD-10-CM

## 2022-06-24 DIAGNOSIS — S06.5X9A SDH (SUBDURAL HEMATOMA) (HCC): Primary | ICD-10-CM

## 2022-06-24 DIAGNOSIS — R26.89 BALANCE DISORDER: ICD-10-CM

## 2022-06-24 PROCEDURE — 97110 THERAPEUTIC EXERCISES: CPT

## 2022-06-24 PROCEDURE — 97161 PT EVAL LOW COMPLEX 20 MIN: CPT

## 2022-06-24 NOTE — PHYSICAL THERAPY NOTE
[facility] PT Discharge Summary    Pt demonstrated good progress from requiring moderate assistance x2 with HHA upon evaluation to Independent for household ambulation without AD at discharge  Pt requires supervision for community ambulation and stairs  Pt continues to be limited by dec balance, dec hearing, dec endurance, and dec multi-tasking ability limiting full functional mobility independence  Pt given HEP focusing on LE strengthening and balance  Family training completed with wife  Pt has met all goals and no AE/DME needs at this time  Would recommend pt safe/appropriate d/c home with wife and would recommend OPPT to improve these deficits and overall functional independence for community ambulation

## 2022-06-24 NOTE — PROGRESS NOTES
PT Evaluation     Today's date: 2022  Patient name: Gareth Garcia  : 1940  MRN: [id number]  Referring provider: Francia Brewer MD  Dx: No diagnosis found  Assessment  Assessment details: Pt is a 79 y o  male who presents to OP PT for IE following referral for balance and motor skills following SDH and evacuation 22  Pt extremely [place], pt wife present t/o session, assisted w/ communicating w/ pt and provided subjective  Upon formal assessment pt demonstrates, mild BLE strength deficits (R>L) and mild coordination impairments (R>L)  TUG, 5xSTS, and BBS indicate that pt is at an elevated risk for falls  Pt is S level for ambulation w/o AD, gait deviations include decreased arm swing (walks w/ hands clasped in front), decreased gait speed, decreased B stride length, and decreased B foot clearance  Pt is also S level for stairs w/ step-to pattern utilized and need for b/l handrails  [place] and increased safety w/ stair negotiation is major goal for pt and wife  Given these findings pt is recommended for skilled PT intervention 2x wk  Pt and wife agreeable to POC  Pt recommended to continue HEP provided by [facility] including seated and standing TE at this time, will progress as appropriate  Impairments: abnormal gait, activity intolerance, impaired balance, impaired physical strength, lacks appropriate home exercise program and safety issue  Understanding of Dx/Px/POC: good   Prognosis: good    Goals  STGs (to be achieved within 2 weeks)  1  Pt and wife will feel comfortable w/ pt completing stairs w/i home using step-to pattern and handrails w/ wife providing S   2  Pt will improve BLE strength to 4+/5 or greater to increase ease w/ functional mobility  LTGs (to be achieved within 8-10 weeks)   1  Pt will be I with HEP at d/c to promote PT carry-over  2  Pt will meet FOTO predicted score     3  Pt will be able to complete stairs using alternating pattern w/ handrail at distant S level  4  Pt will improve TUG time to 15 sec or less to indicate a significant reduction in fall risk  5  Pt will improve 5xSTS time by 10 sec or greater to indicate a significant improvement in functional strength and balance  6  Pt will improve Briggs Balance Scale score to 45/56 or greater to indicate significant improvement in balance and reduction in fall risk  Plan  Patient would benefit from: skilled physical therapy  Planned modality interventions: unattended electrical stimulation, thermotherapy: hydrocollator packs and cryotherapy  Planned therapy interventions: abdominal trunk stabilization, aquatic therapy, balance, joint mobilization, manual therapy, massage, Sanders taping, motor coordination training, neuromuscular re-education, patient education, flexibility, functional ROM exercises, gait training, graded activity, graded exercise, graded motor, home exercise program, transfer training, therapeutic training, therapeutic exercise, therapeutic activities, stretching and strengthening  Frequency: 2x week  Duration in weeks: 6  Treatment plan discussed with: patient and PTA (wife)        Subjective Evaluation    History of Present Illness  Mechanism of injury: Pt presents to OP PT following stay in hospital for SDH  Pt w/ fall in May on and resulting in SDH; L SDH w/ expansion and midline shift (noted on OP CTH)  Pt underwent L craniotomy for SDH evacuation on 5/28 by Dr Efrain Alas  A L MMA embolization was attempted as well however was aborted due to anatomic variation that would not allow for the procedure  Post-op uncomplicated and he was admitted to the [facility] on 6/6 and d/c'ed home w/ family support on 6/21/22  At PT evaluation d/c from hospital pt was S level for all mobility w/ no AD  Pt w/ 1 HATTIE and 12 steps (to the upstairs) and 12 steps (to basement and outdoor patio) w/ b/l handrail  He is completing stairs at home w/ step-to pattern ascending and bumping down descending  Currently pt is having greatest functional difficulty w/ stairs and "strength" per wife       (Pt extremely [place], pt wife provided subjective )  Patient Goals  Patient goal: "get better at the stairs"        Objective     Strength/Myotome Testing     Left Hip   Planes of Motion   Flexion: 4+  Extension: 4+  Abduction: 4    Right Hip   Planes of Motion   Flexion: 4  Extension: 4  Abduction: 4    Left Knee   Flexion: 4+  Extension: 4    Right Knee   Flexion: 4  Extension: 4    Left Ankle/Foot   Dorsiflexion: 4+    Right Ankle/Foot   Dorsiflexion: 4+  Neuro Exam:     Sensation   Light touch LE: left WNL and right WNL    Coordination   Finger to nose: right dysmetria  Rapid alternating movements: UE impaired    Functional outcomes   Functional outcome gait comment: AMBULATION: Decreased arm swing (walks w/ hands clasped in front), decreased gait speed, decreased B stride length, decreased B foot clearance, S level    STAIRS: step-to (ascending & descending), b/l handrails, S level    Outcome Measures IE     5xSTS 37sec (hands on thighs)    TUG 33sec    Briggs Balance Scale 39/56 ([place], some tasks poor command following, may have effected score)                    Precautions: fall, HTN, [place]       Re-eval Date: 7/25/22    Date 6/24/22       Visit Count 1       FOTO completed       Pain In        Pain Out            Manuals 6/24                                                   **superimpose manual dual tasks to balance interventions             Neuro Re-Ed             Marching on foam             FT Foam EC             Tandem stance (firm)             Rapid toe taps              Static lunge onto [place]             Obstacle course                          Ther Ex             NuStep (BUE/LE) L3 x10'            Standing 3 way hip             Standing TR/HR                                       Ther Activity             STS repeats (0UE) x3            Side-stepping             Backwards walking             Step-up repeats (for/lat)             Stair negotiation (big goal for pt)                          Gait Training             encourage arm swinging & bigger steps             Uneven surfaces (outside)             Modalities

## 2022-06-27 ENCOUNTER — OFFICE VISIT (OUTPATIENT)
Dept: CARDIOLOGY CLINIC | Facility: CLINIC | Age: 82
End: 2022-06-27
Payer: MEDICARE

## 2022-06-27 VITALS
BODY MASS INDEX: 24.22 KG/M2 | DIASTOLIC BLOOD PRESSURE: 82 MMHG | SYSTOLIC BLOOD PRESSURE: 136 MMHG | HEART RATE: 88 BPM | HEIGHT: 71 IN | WEIGHT: 173 LBS

## 2022-06-27 DIAGNOSIS — I45.10 RIGHT BUNDLE-BRANCH BLOCK: ICD-10-CM

## 2022-06-27 DIAGNOSIS — I95.9 HYPOTENSION, UNSPECIFIED HYPOTENSION TYPE: ICD-10-CM

## 2022-06-27 DIAGNOSIS — R93.1 ABNORMAL ECHOCARDIOGRAM: Primary | ICD-10-CM

## 2022-06-27 PROCEDURE — 99204 OFFICE O/P NEW MOD 45 MIN: CPT | Performed by: INTERNAL MEDICINE

## 2022-06-27 NOTE — PROGRESS NOTES
Daily Note     Today's date: 2022  Patient name: Lei Fortune  : 1940  MRN: [id number]  Referring provider: Angelo Tinajero MD  Dx:   Encounter Diagnosis     ICD-10-CM    1  SDH (subdural hematoma) ([facility] [address] )  S06 [id number]    2  Balance disorder  R26 89                   Subjective: He is able to go up/down basement with adding 2nd railing  Pt spouse stating we try to walk every day      Objective: See treatment diary below      Assessment: Tolerated treatment well  Pt provided sign board for communication 2* [place]  Pt demon difficulty scan environment with ambul  Noted decrease ILIANA/step stride with gait   Patient would benefit from continued PT      Plan: Continue per plan of care        Precautions: fall, HTN, [place]       Re-eval Date: 22    Date 22      Visit Count 1 2      FOTO completed       Pain In        Pain Out            Manuals                                **superimpose manual dual tasks to balance interventions        Neuro Re-Ed        Marching on foam  // 6x   0 UE      FT Foam EC        Tandem stance (firm)        Rapid toe taps         Static lunge onto ISH        Obstacle course                Ther Ex        NuStep (BUE/LE) L3 x10' L3 10 min      Standing 3 way hip        Standing TR/HR                        Ther Activity        STS repeats (0UE) x3       Side-stepping  // 6x  1 UE        Backwards walking  // 6x  0 UE      Step-up repeats (for/lat)        Stair negotiation (big goal for pt)                Gait Training        encourage arm swinging & bigger steps  Clinic  10 min  Focus > step stride/ILIANA, scan environment with cone search, turn negotion  CS      Uneven surfaces (outside)        Modalities

## 2022-06-27 NOTE — ASSESSMENT & PLAN NOTE
This seems to have improved with being back at home and eating better  He is on a very low dose of midodrine and I find it unlikely that this is helping anything  Will try stopping it

## 2022-06-27 NOTE — ASSESSMENT & PLAN NOTE
Systolic anterior motion of the mitral valve is mention but I do not hear any outflow tract gradient on exam and I do not believe this needs to be pursued further

## 2022-06-27 NOTE — PROGRESS NOTES
Patient ID: Amalia Encinas is a 79 y o  male  Plan:      Abnormal echocardiogram  Systolic anterior motion of the mitral valve is mention but I do not hear any outflow tract gradient on exam and I do not believe this needs to be pursued further  Hypotension  This seems to have improved with being back at home and eating better  He is on a very low dose of midodrine and I find it unlikely that this is helping anything  Will try stopping it  Right bundle-branch block  No evidence for higher degree block  Was present in 2021 as well  Follow up Plan/Other summary comments:  I stop midodrine today  His wife will check blood pressure at home  Unless there were no issues, follow-up with me can be as needed at this point  HPI:  Patient is seen in follow-up today regarding abnormal EKG and echocardiogram   On May 28th patient underwent drainage of increasing subdural hematoma  He was in our local rehab facility  Upon discharge he was told to follow-up with cardiology based on an abnormal EKG and echo  An echocardiogram had revealed normal LV systolic function but there was systolic anterior motion of the mitral supporting structure  An EKG revealed a right bundle branch block pattern  I have looked through the records and on 9/16/2021 a similar EKG was present  There has been no recent chest pain or chest pressure  No shortness of breath  There is significant difficulty with hearing and balance has been fair  Most recent or relevant cardiac/vascular testing:    EKG 05/27/2022:  Normal sinus rhythm  Right bundle branch block pattern  No change from 09/16/2021  TTE 06/01/2022:  LVEF 55%  Systolic anterior motion of the mitral valve        Past Surgical History:   Procedure Laterality Date    BRAIN HEMATOMA EVACUATION Left 5/28/2022    Procedure: left CRANIOTOMY FOR SUBDURAL HEMATOMA;  Surgeon: Denyn Srinivasan MD;  [facility];  Service: Neurosurgery    HERNIA REPAIR Right     inguinal    IR CEREBRAL ANGIOGRAPHY / INTERVENTION  6/2/2022    ME REVISE MEDIAN N/CARPAL TUNNEL SURG Right 9/20/2021    Procedure: RELEASE CARPAL TUNNEL;  Surgeon: Morelia Melgar MD;  Location: [facility];  Service: Orthopedics     CMP:   Lab Results   Component Value Date     05/11/2018    K 4 0 06/06/2022    K 4 7 05/11/2018     06/06/2022     05/11/2018    CO2 29 06/06/2022    CO2 26 05/11/2018    BUN 15 06/06/2022    BUN 17 05/11/2018    CREATININE 0 70 06/06/2022    CREATININE 0 84 05/11/2018    EGFR 87 06/06/2022       Lipid Profile:   Lab Results   Component Value Date    CHOL 169 05/11/2018    TRIG 41 05/05/2022    TRIG 57 05/11/2018    HDL 68 05/05/2022    HDL 58 05/11/2018         Review of Systems   10  point ROS  was otherwise non pertinent or negative except as per HPI or as below  Gait: Normal          Objective:     /82   Pulse 88   Ht 5' 11" (1 803 m)   Wt 78 5 kg (173 lb)   BMI 24 13 kg/m²     PHYSICAL EXAM:    General:  Normal appearance in no distress  Eyes:  Anicteric  Oral mucosa:  Moist   Neck:  No JVD  Carotid upstrokes are brisk without bruits  No masses  Chest:  Clear to auscultation  Cardiac:  No palpable PMI  Normal S1 and S2  No murmur gallop or rub  Abdomen:  Soft and nontender  No palpable organomegaly or aortic enlargement  Extremities:  No peripheral edema  Musculoskeletal:  Symmetric  Vascular:  Femoral pulses are brisk without bruits  Popliteal pulses are intact bilaterally  Pedal pulses are intact  Neuro:  Grossly symmetric  Psych:  Alert and oriented x3          Current Outpatient Medications:     acetaminophen (TYLENOL) 325 mg tablet, Take 2 tablets (650 mg total) by mouth every 6 (six) hours as needed for mild pain, Disp: , Rfl: 0    docusate sodium (COLACE) 100 mg capsule, Take 1 capsule (100 mg total) by mouth 2 (two) times a day, Disp: 60 capsule, Rfl: 0    melatonin 3 mg, Take 2 tablets (6 mg total) by mouth daily at bedtime, Disp: 60 tablet, Rfl: 0    Multiple Vitamin (MULTIVITAMIN) tablet, Take by mouth daily , Disp: , Rfl:     Multiple Vitamins-Minerals (PRESERVISION AREDS 2 PO), Take by mouth, Disp: , Rfl:     senna (SENOKOT) 8 6 mg, Take 1 tablet (8 6 mg total) by mouth daily at bedtime, Disp: 15 tablet, Rfl: 0    simvastatin (ZOCOR) 20 mg tablet, Take 1 tablet (20 mg total) by mouth daily at bedtime, Disp: 90 tablet, Rfl: 2  No Known Allergies  Past Medical History:   Diagnosis Date    Arthritis     Encounter for general adult medical examination without abnormal findings 3/20/2019    Hyperlipidemia     Urinary retention 6/2/2022           Social History     Tobacco Use   Smoking Status Former Smoker   Smokeless Tobacco Never Used   Tobacco Comment    Smoked as a teenager

## 2022-06-28 ENCOUNTER — OFFICE VISIT (OUTPATIENT)
Dept: PHYSICAL THERAPY | Facility: CLINIC | Age: 82
End: 2022-06-28
Payer: MEDICARE

## 2022-06-28 ENCOUNTER — OFFICE VISIT (OUTPATIENT)
Dept: FAMILY MEDICINE CLINIC | Facility: CLINIC | Age: 82
End: 2022-06-28
Payer: MEDICARE

## 2022-06-28 VITALS
HEIGHT: 71 IN | DIASTOLIC BLOOD PRESSURE: 70 MMHG | TEMPERATURE: 98.7 F | HEART RATE: 96 BPM | OXYGEN SATURATION: 96 % | SYSTOLIC BLOOD PRESSURE: 118 MMHG | BODY MASS INDEX: 24.08 KG/M2 | WEIGHT: 172 LBS

## 2022-06-28 DIAGNOSIS — R26.89 BALANCE DISORDER: ICD-10-CM

## 2022-06-28 DIAGNOSIS — S06.5X9A SDH (SUBDURAL HEMATOMA) (HCC): Primary | ICD-10-CM

## 2022-06-28 DIAGNOSIS — Z76.89 ENCOUNTER FOR SUPPORT AND COORDINATION OF TRANSITION OF CARE: Primary | ICD-10-CM

## 2022-06-28 DIAGNOSIS — I45.10 RIGHT BUNDLE-BRANCH BLOCK: ICD-10-CM

## 2022-06-28 DIAGNOSIS — I95.9 HYPOTENSION, UNSPECIFIED HYPOTENSION TYPE: ICD-10-CM

## 2022-06-28 DIAGNOSIS — S06.5X9A SDH (SUBDURAL HEMATOMA) (HCC): ICD-10-CM

## 2022-06-28 DIAGNOSIS — R93.1 ABNORMAL ECHOCARDIOGRAM: ICD-10-CM

## 2022-06-28 PROCEDURE — 99496 TRANSJ CARE MGMT HIGH F2F 7D: CPT | Performed by: FAMILY MEDICINE

## 2022-06-28 PROCEDURE — 97110 THERAPEUTIC EXERCISES: CPT

## 2022-06-28 PROCEDURE — 97112 NEUROMUSCULAR REEDUCATION: CPT

## 2022-06-28 NOTE — PROGRESS NOTES
Assessment/Plan:     No problem-specific Assessment & Plan notes found for this encounter  Diagnoses and all orders for this visit:    Encounter for support and coordination of transition of care    SDH (subdural hematoma) ([facility] )  Comments:  S/P evacuation by Dr Teo Ronquillo on 5/28  Stable neurologically, F/U NS on 7/7    Balance disorder  Comments:  Continue with PT    Right bundle-branch block  Comments:  Reviewed Dr Azam Munoz note-  has been present    Abnormal echocardiogram  Comments:  Reviewed Cardiology note - nothing that needs to be addressed at this time    Hypotension, unspecified hypotension type  Comments:  Midodrine D/C'd by Cardiology - no symptoms of dizziness/lightheadedness, no syncope         Subjective:     Patient ID: Kayce Ball is a 79 y o  male  Patient presents for transition of care management appointment following hospitalization from 5/27-6/6  Patient was noted to have a subdural hematoma on CT scan of the brain, serial CTs were done noting increasing size of the subdural hematoma with midline shift, was ultimately admitted to the hospital for evacuation of the subdural hematoma which was done on May 28  Patient was admitted inpatient rehab on June 6th and discharged on June 21st   Hospital records diagnostics and labs reviewed, PM&R note from rehab stay reviewed  Patient was noted to have abnormalities on EKG and echocardiogram   Patient saw Dr Heavenly Garvin and yesterday, that note was reviewed, it was found that the right bundle branch block have been present on an EKG a year ago, echocardiogram did not reveal any findings that requires any intervention at this time  Patient was placed on low-dose my Nutren 2 5 mg twice daily while in rehab for episodes of hypotension, Cardiology did not feel that this was beneficial and that has been discontinued  Patient's blood pressure low today but he denies dizziness or lightheadedness  Blood pressures are being taken at home by his wife  Gerhardt Passy will have outpatient follow-up with Neurosurgery on July 7th with a CTH to be done 2-3 days prior to that appointment  He had his initial outpatient PT evaluation on June 24th and a session today regarding his balance disorder  He will go to PT twice weekly  Overall Mary Medrano feels Glenis Patten is doing well, he is eating better, he is able to ambulate without difficulty, no balance issues or falls  Review of Systems   Constitutional: Negative  Negative for chills, fatigue and fever  HENT: Negative  Eyes: Negative  Negative for visual disturbance  Respiratory: Negative for cough, chest tightness, shortness of breath and wheezing  Cardiovascular: Negative for chest pain and palpitations  Gastrointestinal: Negative for abdominal pain, blood in stool, constipation, diarrhea, nausea and vomiting  Endocrine: Negative  Genitourinary: Negative for difficulty urinating, dysuria, frequency, hematuria and urgency  Musculoskeletal: Negative for arthralgias and myalgias  Skin: Negative  Allergic/Immunologic: Negative  Neurological: Negative for dizziness, seizures, syncope, weakness, light-headedness, numbness and headaches  Hematological: Negative for adenopathy  Psychiatric/Behavioral: Negative  Negative for dysphoric mood  The patient is not nervous/anxious  Objective:     Physical Exam  Vitals and nursing note reviewed  Constitutional:       General: He is not in acute distress  Appearance: Normal appearance  He is not ill-appearing, toxic-appearing or diaphoretic  HENT:      Head: Normocephalic and atraumatic  Mouth/Throat:      Mouth: Mucous membranes are moist    Eyes:      Extraocular Movements: Extraocular movements intact  Conjunctiva/sclera: Conjunctivae normal       Pupils: Pupils are equal, round, and reactive to light  Neck:      Vascular: No carotid bruit  Cardiovascular:      Rate and Rhythm: Normal rate and regular rhythm        Pulses: Normal pulses  Heart sounds: Normal heart sounds  No murmur heard  Pulmonary:      Effort: Pulmonary effort is normal  No respiratory distress  Breath sounds: Normal breath sounds  No stridor  No wheezing, rhonchi or rales  Abdominal:      General: Abdomen is flat  There is no distension  Palpations: Abdomen is soft  There is no mass  Tenderness: There is no abdominal tenderness  There is no guarding or rebound  Musculoskeletal:         General: No tenderness  Cervical back: Normal range of motion and neck supple  No rigidity  Right lower leg: No edema  Left lower leg: No edema  Lymphadenopathy:      Cervical: No cervical adenopathy  Skin:     General: Skin is warm and dry  Neurological:      General: No focal deficit present  Mental Status: He is alert and oriented to person, place, and time  Psychiatric:         Mood and Affect: Mood normal          Behavior: Behavior normal          Thought Content: Thought content normal          Judgment: Judgment normal            Vitals:    06/28/22 1243   BP: 118/70   Pulse: 96   Temp: 98 7 °F (37 1 °C)   SpO2: 96%   Weight: 78 kg (172 lb)   Height: 5' 11" (1 803 m)       Transitional Care Management Review:  Sherine Jacinto is a 79 y o  male here for TCM follow up  During the TCM phone call patient stated:    TCM Call (since 5/28/2022)     Date and time call was made  6/6/2022  2:54 PM    Hospital care reviewed  Records reviewed    Patient was hospitialized at  [facility]    Date of Admission  05/27/22    Date of discharge  06/06/22  transferrred to [address] [place] [address] on 6/6/22    Diagnosis  SDH    Disposition  Rehabilitation center    Were the patients medications reviewed and updated  Yes    Current Symptoms  None      TCM Call (since 5/28/2022)     Post hospital issues  None    Should patient be enrolled in anticoag monitoring? No    Scheduled for follow up?   Yes    Did you obtain your prescribed medications  Yes    Do you need help managing your prescriptions or medications  No    Is transportation to your appointment needed  No    I have advised the patient to call PCP with any new or worsening symptoms  grant Kuhn, DO

## 2022-06-30 ENCOUNTER — OFFICE VISIT (OUTPATIENT)
Dept: PHYSICAL THERAPY | Facility: CLINIC | Age: 82
End: 2022-06-30
Payer: MEDICARE

## 2022-06-30 DIAGNOSIS — R26.89 BALANCE DISORDER: ICD-10-CM

## 2022-06-30 DIAGNOSIS — S06.5X9A SDH (SUBDURAL HEMATOMA) (HCC): Primary | ICD-10-CM

## 2022-06-30 DIAGNOSIS — R26.9 GAIT ABNORMALITY: ICD-10-CM

## 2022-06-30 PROCEDURE — 97110 THERAPEUTIC EXERCISES: CPT | Performed by: PHYSICAL THERAPIST

## 2022-06-30 PROCEDURE — 97112 NEUROMUSCULAR REEDUCATION: CPT | Performed by: PHYSICAL THERAPIST

## 2022-06-30 NOTE — PROGRESS NOTES
Daily Note     Today's date: 2022  Patient name: Domingo Teixeira  : 1940  MRN: [id number]  Referring provider: Kimmie Nicole MD  Dx:   Encounter Diagnosis     ICD-10-CM    1  SDH (subdural hematoma) ([facility] )  S06 5X9A    2  Balance disorder  R26 89    3  Gait abnormality  R26 9                   Subjective: No new complaints  Objective: See treatment diary below      Assessment: Continued to use face shield and communication board this session  Pt has tendency to look down and required frequent cues to keep head up  He does not responded well tactile touch and overall demo decreased awareness secondary to [facility]  Patient would benefit from continued PT      Plan: Progress treatment as tolerated         Precautions: fall, HTN, [place]       Re-eval Date: 22    Date 22      Visit Count 1 2      FOTO completed       Pain In        Pain Out            Manuals                                **superimpose manual dual tasks to balance interventions        Neuro Re-Ed        Marching on foam  // 6x   0 UE      FT Foam EC        Tandem stance (firm)        Rapid toe taps         Static lunge onto [employer]        Obstacle course                Ther Ex        NuStep (BUE/LE) L3 x10' L3 10 min L5, 10 min      Standing 3 way hip        Standing TR/HR                        Ther Activity        STS repeats (0UE) x3       Side-stepping  // 6x  1 UE   6 x 1 UE     Backwards walking  // 6x  0 UE 6x      Step-up repeats (for/lat)        Stair negotiation (big goal for pt)   Step ups 6" B UE 10x fw      Hurdles   Fw/lat x 6     Gait Training        encourage arm swinging & bigger steps  Clinic  10 min  Focus > step stride/ILIANA, scan environment with cone search, turn negotion  CS 10 min clinic, arm swings and big steps      Uneven surfaces (outside)        Modalities

## 2022-07-01 ENCOUNTER — HOSPITAL ENCOUNTER (OUTPATIENT)
Dept: CT IMAGING | Facility: HOSPITAL | Age: 82
Discharge: HOME | End: 2022-07-01
Payer: MEDICARE

## 2022-07-01 DIAGNOSIS — S06.5XAA SDH (SUBDURAL HEMATOMA): ICD-10-CM

## 2022-07-01 PROCEDURE — G1004 CDSM NDSC: HCPCS

## 2022-07-01 PROCEDURE — 70450 CT HEAD/BRAIN W/O DYE: CPT

## 2022-07-01 NOTE — PROGRESS NOTES
Daily Note     Today's date: 2022  Patient name: Vanessa Bejarano  : 1940  MRN: [id number]  Referring provider: Curtis Ortez MD  Dx:   Encounter Diagnosis     ICD-10-CM    1  SDH (subdural hematoma) ([facility] )  S06 5X9A    2  Balance disorder  R26 89    3  Gait abnormality  R26 9                   Subjective: Pt states doing HEP, denies any recent falls,  Dressing self, walking daily 10-15 min      Objective: See treatment diary below      Assessment: Tolerated treatment farily well  Pt requests frequent brief seated rest 2* feeling hot/fatigue this date  Communicated with pt with sign board, provided mirroring with progression of exercises  Struggled with understanding step up sequence   LOB x 2 with CGA>balance recovery   Patient demonstrated fatigue post treatment and would benefit from continued PT      Plan: Continue per plan of care           Precautions: fall, HTN, [place]       Re-eval Date: 22    Date 22    Visit Count 1 2 3 4    FOTO completed       Pain In        Pain Out            Manuals                                **superimpose manual dual tasks to balance interventions        Neuro Re-Ed        Marching on foam  // 6x   0 UE  SOLO  HKM   3# AW BL  5x    FT Foam EC        Tandem stance (firm)        Rapid toe taps         Static lunge onto Bosu        Ball toss         Hurdles        Obstacle course        Retro step    SOLO  HKM   3# AW BL  5x    Side Step    SOLO  HKM   3# AW BL  5x                    Ther Ex        NuStep (BUE/LE) L3 x10' L3 10 min L5, 10 min  L5, 10 min     Standing 3 way hip        Standing TR/HR                        Ther Activity        STS repeats (0UE) x3   2x5  0-1 UE    Side-stepping  // 6x  1 UE   6 x 1 UE See above    Backwards walking  // 6x  0 UE 6x  See above    Step-up repeats (for/lat)        Stair negotiation (big goal for pt)   Step ups 6" B UE 10x fw  SOLO  4"  10x R/L  3# AW    Hurdles   Fw/lat x 6     Gait Training encourage arm swinging & bigger steps  Clinic  10 min  Focus > step stride/ILIANA, scan environment with cone search, turn negotion  CS 10 min clinic, arm swings and big steps  resume    Uneven surfaces (outside)        Modalities

## 2022-07-05 ENCOUNTER — OFFICE VISIT (OUTPATIENT)
Dept: PHYSICAL THERAPY | Facility: CLINIC | Age: 82
End: 2022-07-05
Payer: MEDICARE

## 2022-07-05 DIAGNOSIS — R26.9 GAIT ABNORMALITY: ICD-10-CM

## 2022-07-05 DIAGNOSIS — R26.89 BALANCE DISORDER: ICD-10-CM

## 2022-07-05 DIAGNOSIS — S06.5XAA SDH (SUBDURAL HEMATOMA): Primary | ICD-10-CM

## 2022-07-05 PROCEDURE — 97112 NEUROMUSCULAR REEDUCATION: CPT

## 2022-07-05 PROCEDURE — 97110 THERAPEUTIC EXERCISES: CPT

## 2022-07-06 ENCOUNTER — OFFICE VISIT (OUTPATIENT)
Dept: PODIATRY | Facility: CLINIC | Age: 82
End: 2022-07-06
Payer: MEDICARE

## 2022-07-06 VITALS
DIASTOLIC BLOOD PRESSURE: 80 MMHG | SYSTOLIC BLOOD PRESSURE: 117 MMHG | HEART RATE: 78 BPM | BODY MASS INDEX: 24.08 KG/M2 | HEIGHT: 71 IN | WEIGHT: 172 LBS

## 2022-07-06 DIAGNOSIS — B35.1 ONYCHOMYCOSIS: Primary | ICD-10-CM

## 2022-07-06 DIAGNOSIS — I73.9 PERIPHERAL VASCULAR DISEASE, UNSPECIFIED (HCC): ICD-10-CM

## 2022-07-06 PROCEDURE — 11721 DEBRIDE NAIL 6 OR MORE: CPT | Performed by: PODIATRIST

## 2022-07-06 NOTE — PROGRESS NOTES
[facility] CHAKA  1940  AT RISK FOOT CARE    1  Onychomycosis     2  Peripheral vascular disease, unspecified ([facility] [address] )         Patient presents for at-risk foot care  Patient has no acute concerns today  Patient has significant lower extremity risk due to diminished pulses in the feet and trophic skin changes to the lower extremity including thick toenail, atrophic skin, and decreased hair growth  On exam patient has thickened, hypertrophic, discolored, brittle toenails with subungual debris and tenderness x10   Callus: 0  Patient has diminished pedal pulses and decreased perfusion to the lower extremities  Patient has significant trophic changes to the skin including thick toenails, decreased pedal hair and atrophic skin  Today's treatment includes:  Debridement of toenails  Using nail nipper, álvaro, and curette, nails were sharply debrided, reduced in thickness and length  Devitalized nail tissue and fungal debris excised and removed  Patient tolerated well  Discussed proper shoe gear, daily inspections of feet, and general foot health with patient  Patient has Q8  findings and is recommended for at risk foot care every 9-10 weeks      Patients most recent complete clinical foot exam was on: 5/3

## 2022-07-07 ENCOUNTER — OFFICE VISIT (OUTPATIENT)
Dept: PHYSICAL THERAPY | Facility: CLINIC | Age: 82
End: 2022-07-07
Payer: MEDICARE

## 2022-07-07 ENCOUNTER — TELEMEDICINE (OUTPATIENT)
Dept: NEUROSURGERY | Facility: CLINIC | Age: 82
End: 2022-07-07

## 2022-07-07 VITALS — WEIGHT: 165 LBS | BODY MASS INDEX: 23.01 KG/M2

## 2022-07-07 DIAGNOSIS — S06.5XAA SDH (SUBDURAL HEMATOMA): Primary | ICD-10-CM

## 2022-07-07 DIAGNOSIS — R26.89 BALANCE DISORDER: ICD-10-CM

## 2022-07-07 DIAGNOSIS — R26.9 GAIT ABNORMALITY: ICD-10-CM

## 2022-07-07 PROCEDURE — 97112 NEUROMUSCULAR REEDUCATION: CPT | Performed by: PHYSICAL THERAPIST

## 2022-07-07 PROCEDURE — 99024 POSTOP FOLLOW-UP VISIT: CPT | Performed by: NEUROLOGICAL SURGERY

## 2022-07-07 PROCEDURE — 97110 THERAPEUTIC EXERCISES: CPT | Performed by: PHYSICAL THERAPIST

## 2022-07-07 NOTE — PROGRESS NOTES
Virtual Regular Visit    Verification of patient location:    Patient is located in the following state in which I hold an active license [place]      Assessment/Plan:    Problem List Items Addressed This Visit        Nervous and Auditory    SDH (subdural hematoma) ([facility] ) - Primary    Relevant Orders    CT head wo contrast               Reason for visit is   Chief Complaint   Patient presents with    Virtual Regular Visit     6 Weeks POV F/u for Subdural hematoma, S/p (DKO) left CRANIOTOMY FOR SUBDURAL HEMATOMA [5/28/2022];  CT Head 7/1/22 SL          Encounter provider Quan Phillips MD    Provider located at [address] [place] [id number] [phone]      Recent Visits  No visits were found meeting these conditions  Showing recent visits within past 7 days and meeting all other requirements  Today's Visits  Date Type Provider Dept   07/07/22 Telemedicine Quan Phillips MD [address] [place] [address] today's visits and meeting all other requirements  Future Appointments  No visits were found meeting these conditions  Showing future appointments within next 150 days and meeting all other requirements       The patient was identified by name and date of birth  David Solorzano was informed that this is a telemedicine visit and that the visit is being conducted through Telephone  My office door was closed  No one else was in the room  He acknowledged consent and understanding of privacy and security of the video platform  The patient has agreed to participate and understands they can discontinue the visit at any time  Patient is aware this is a billable service  Subjective  David Solorzano is a 79 y o  male who underwent a craniotomy for membrane stripping of a subdural hematoma  The subdural hematoma continues to diminish in size although it is not completely gone  The patient is asymptomatic    He is back to normal activity safe for driving on a road as well as mowing the grass  He is able to do all other activities  He is going to see Dr Jackelyn Flores regarding his hearing deficit  He denies headaches  Past Medical History:   Diagnosis Date    Arthritis     Encounter for general adult medical examination without abnormal findings 3/20/2019    Hyperlipidemia     Urinary retention 6/2/2022       Past Surgical History:   Procedure Laterality Date    BRAIN HEMATOMA EVACUATION Left 5/28/2022    Procedure: left CRANIOTOMY FOR SUBDURAL HEMATOMA;  Surgeon: Leroy Torres MD;  Location:  MAIN OR;  Service: Neurosurgery    HERNIA REPAIR Right     inguinal    IR CEREBRAL ANGIOGRAPHY / INTERVENTION  6/2/2022    SD REVISE MEDIAN N/CARPAL TUNNEL SURG Right 9/20/2021    Procedure: RELEASE CARPAL TUNNEL;  Surgeon: Princess Kevin MD;  Location: [facility] MAIN OR;  Service: Orthopedics       Current Outpatient Medications   Medication Sig Dispense Refill    acetaminophen (TYLENOL) 325 mg tablet Take 2 tablets (650 mg total) by mouth every 6 (six) hours as needed for mild pain  0    Multiple Vitamin (MULTIVITAMIN) tablet Take by mouth daily       simvastatin (ZOCOR) 20 mg tablet Take 1 tablet (20 mg total) by mouth daily at bedtime 90 tablet 2    docusate sodium (COLACE) 100 mg capsule Take 1 capsule (100 mg total) by mouth 2 (two) times a day (Patient not taking: Reported on 7/7/2022) 60 capsule 0    melatonin 3 mg Take 2 tablets (6 mg total) by mouth daily at bedtime (Patient not taking: Reported on 7/7/2022) 60 tablet 0    Multiple Vitamins-Minerals (PRESERVISION AREDS 2 PO) Take by mouth (Patient not taking: Reported on 7/7/2022)      senna (SENOKOT) 8 6 mg Take 1 tablet (8 6 mg total) by mouth daily at bedtime (Patient not taking: Reported on 7/7/2022) 15 tablet 0     No current facility-administered medications for this visit  No Known Allergies    Review of Systems   Constitutional: Negative    Negative for activity change  HENT: Positive for hearing loss (b/l )  Eyes: Negative  Negative for photophobia, pain and visual disturbance  Respiratory: Negative  Cardiovascular: Negative  Follows with cardiologist     Gastrointestinal: Negative  Negative for constipation, nausea and vomiting  Endocrine: Negative  Genitourinary: Positive for frequency  Negative for urgency  Musculoskeletal: Positive for gait problem  Negative for joint swelling (occasional knee-cortisone injection ), myalgias and neck stiffness  Per patient, better since last visit      PT - 2x weekly, very helpful    No falls   Skin: Negative  Allergic/Immunologic: Negative  Neurological: Positive for speech difficulty (due to hearing loss)  Negative for dizziness, weakness, numbness and headaches  Hematological: Negative  Psychiatric/Behavioral: Negative  Negative for confusion, decreased concentration and sleep disturbance  I reviewed the ROS  Video Exam    Vitals:    07/07/22 1456   Weight: 74 8 kg (165 lb)       Physical Exam   Awake alert and oriented  His extraocular movements are intact  He is a severe speech deficit in his hard of hearing  He uses an amplification system which is helpful to him  I spent 20 minutes directly with the patient during this visit  Imaging studies    CT scan of the brain is carefully reviewed and compared to a study from 27 May 2022  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

## 2022-07-07 NOTE — PROGRESS NOTES
Daily Note     Today's date: 2022  Patient name: Benton Benson  : 1940  MRN: [id number]  Referring provider: Ursula Mckeon MD  Dx:   Encounter Diagnosis     ICD-10-CM    1  SDH (subdural hematoma) ([facility] )  S06 5X9A    2  Balance disorder  R26 89    3  Gait abnormality  R26 9                   Subjective: Came early, thinks they mixed up their appointment time per pt's wife  Objective: See treatment diary below      Assessment: Tolerated treatment well but frequent seated rest breaks required  VC needed to fully turn and use UE to lower himself when sitting down in a chair for safety  Patient would benefit from continued PT      Plan: Progress treatment as tolerated         Precautions: fall, HTN, [place]        Re-eval Date: 22    Date 22   Visit Count 1 2 3 4 5   FOTO completed       Pain In        Pain Out            Manuals                                **superimpose manual dual tasks to balance interventions        Neuro Re-Ed        Marching on foam  // 6x   0 UE  SOLO  HKM   3# AW BL  5x SOLO  HKM   3# AW BL  5x   FT Foam EC        Tandem stance (firm)        Rapid toe taps         Static lunge onto Bosu        Ball toss         Hurdles        Obstacle course        Retro step    SOLO  HKM   3# AW BL  5x SOLO  HKM   3# AW BL  5x   Side Step    SOLO  HKM   3# AW BL  5x SOLO  HKM   3# AW BL  5x                   Ther Ex        NuStep (BUE/LE) L3 x10' L3 10 min L5, 10 min  L5, 10 min  L5, 10 min    Standing 3 way hip        Standing TR/HR                        Ther Activity        STS repeats (0UE) x3   2x5  0-1 UE Yellow foam, arms crossed 10x    Side-stepping  // 6x  1 UE   6 x 1 UE See above    Backwards walking  // 6x  0 UE 6x  See above    Step-up repeats (for/lat)        Stair negotiation (big goal for pt)   Step ups 6" B UE 10x fw  SOLO  4"  10x R/L   3# AW SOLO  4"  10x R/L    3# AW   Hurdles   Fw/lat x 6  SOLO  HKM   3# AW BL  5x   Gait Training encourage arm swinging & bigger steps  Clinic  10 min  Focus > step stride/ILIANA, scan environment with cone search, turn negotion  CS 10 min clinic, arm swings and big steps  resume    Uneven surfaces (outside)        Modalities

## 2022-07-12 ENCOUNTER — OFFICE VISIT (OUTPATIENT)
Dept: PHYSICAL THERAPY | Facility: CLINIC | Age: 82
End: 2022-07-12
Payer: MEDICARE

## 2022-07-12 DIAGNOSIS — R26.89 BALANCE DISORDER: ICD-10-CM

## 2022-07-12 DIAGNOSIS — S06.5XAA SDH (SUBDURAL HEMATOMA): Primary | ICD-10-CM

## 2022-07-12 DIAGNOSIS — R26.9 GAIT ABNORMALITY: ICD-10-CM

## 2022-07-12 PROCEDURE — 97110 THERAPEUTIC EXERCISES: CPT | Performed by: PHYSICAL THERAPIST

## 2022-07-12 PROCEDURE — 97112 NEUROMUSCULAR REEDUCATION: CPT | Performed by: PHYSICAL THERAPIST

## 2022-07-12 NOTE — PROGRESS NOTES
Daily Note     Today's date: 2022  Patient name: Emy Rubin  : 1940  MRN: [id number]  Referring provider: Chaz Alcantar MD  Dx:   Encounter Diagnosis     ICD-10-CM    1  SDH (subdural hematoma) ([facility] )  S06 5X9A    2  Balance disorder  R26 89    3  Gait abnormality  R26 9                   Subjective: No new complaints  Objective: See treatment diary below      Assessment: Tolerated treatment well  Continued with communication board  He was able complete hurdles with step to pattern and improved balance and coordination noted with increased reps  Patient would benefit from continued PT      Plan: Progress treatment as tolerated         Precautions: fall, HTN, [place]        Re-eval Date: 22    Date    Visit Count 6 2 3 4 5   FOTO        Pain In        Pain Out            Manuals                                **superimpose manual dual tasks to balance interventions        Neuro Re-Ed        Marching on foam SOLO  HKM   3# AW BL  5x // 6x   0 UE  SOLO  HKM   3# AW BL  5x SOLO  HKM   3# AW BL  5x   FT Foam EC        Tandem stance (firm)        Rapid toe taps         Static lunge onto Bosu        Ball toss         Hurdles SOLO  3# AW BL  5x fw/lat        Obstacle course        Retro step SOLO  3# AW BL  5x   SOLO  HKM   3# AW BL  5x SOLO  3# AW BL  5x   Side Step SOLO  3# AW BL  5x   SOLO  HKM   3# AW BL  5x SOLO  3# AW BL  5x                   Ther Ex        NuStep (BUE/LE) L6, 10 min ' L3 10 min L5, 10 min  L5, 10 min  L5, 10 min    Standing 3 way hip        Standing TR/HR                        Ther Activity        STS repeats (0UE) Yellow foam, arms crossed 10x    2x5  0-1 UE Yellow foam, arms crossed 10x    Side-stepping  // 6x  1 UE   6 x 1 UE See above    Backwards walking  // 6x  0 UE 6x  See above    Step-up repeats (for/lat)        Stair negotiation (big goal for pt)   Step ups 6" B UE 10x fw  SOLO  4"  10x R/L   3# AW SOLO  4"  10x R/L    3# AW   Hurdles Fw/lat x 6  SOLO  HKM   3# AW BL  5x   Gait Training        encourage arm swinging & bigger steps  Clinic  10 min  Focus > step stride/ILIANA, scan environment with cone search, turn negotion  CS 10 min clinic, arm swings and big steps  resume    Uneven surfaces (outside)        Modalities

## 2022-07-13 NOTE — PROGRESS NOTES
Daily Note     Today's date: 2022  Patient name: Elaine Graves  : 1940  MRN: [id number]  Referring provider: Mack Kincaid MD  Dx:   Encounter Diagnosis     ICD-10-CM    1  SDH (subdural hematoma) ([facility] )  S06 5X9A    2  Balance disorder  R26 89    3  Gait abnormality  R26 9                   Subjective: No new co's      Objective: See treatment diary below      Assessment: Tolerated treatment well  Pt hesitates with step over/hiram negotiation  Pt w/o LOB this date  Pt with improved endurance  <SOB/required seated rest  Patient would benefit from continued PT      Plan: Continue per plan of care        Precautions: fall, HTN, [place]        Re-eval Date: 22    Date       Visit Count 6 7      FOTO        Pain In        Pain Out            Manuals                                **superimpose manual dual tasks to balance interventions        Neuro Re-Ed        Marching on foam SOLO  HKM   3# AW BL  5x SOLO  HKM   5# AW BL  7x      FT Foam EC        Tandem stance (firm)        Rapid toe taps         Static lunge onto Bosu        Ball toss         Hurdles SOLO  3# AW BL  5x fw/lat        Obstacle course  SOLO and w/o SOLO  4" step over  Hiram  Yellow circles  10 min      Retro step SOLO  3# AW BL  5x SOLO  HKM   5# AW BL        Side Step SOLO  3# AW BL  5x SOLO  HKM   5# AW BL                        Ther Ex        NuStep (BUE/LE) L6, 10 min ' L6, 10 min       Standing 3 way hip        Standing TR/HR                        Ther Activity        STS repeats (0UE) Yellow foam, arms crossed 10x  resume      Side-stepping        Backwards walking        Step-up repeats (for/lat)        Stair negotiation (big goal for pt)        Hurdles        Gait Training        encourage arm swinging & bigger steps        Uneven surfaces (outside)        Modalities

## 2022-07-14 ENCOUNTER — OFFICE VISIT (OUTPATIENT)
Dept: PHYSICAL THERAPY | Facility: CLINIC | Age: 82
End: 2022-07-14
Payer: MEDICARE

## 2022-07-14 DIAGNOSIS — R26.89 BALANCE DISORDER: ICD-10-CM

## 2022-07-14 DIAGNOSIS — S06.5XAA SDH (SUBDURAL HEMATOMA): Primary | ICD-10-CM

## 2022-07-14 DIAGNOSIS — R26.9 GAIT ABNORMALITY: ICD-10-CM

## 2022-07-14 PROCEDURE — 97112 NEUROMUSCULAR REEDUCATION: CPT

## 2022-07-19 ENCOUNTER — OFFICE VISIT (OUTPATIENT)
Dept: PHYSICAL THERAPY | Facility: CLINIC | Age: 82
End: 2022-07-19
Payer: MEDICARE

## 2022-07-19 DIAGNOSIS — R26.89 BALANCE DISORDER: ICD-10-CM

## 2022-07-19 DIAGNOSIS — S06.5XAA SDH (SUBDURAL HEMATOMA): Primary | ICD-10-CM

## 2022-07-19 DIAGNOSIS — R26.9 GAIT ABNORMALITY: ICD-10-CM

## 2022-07-19 PROCEDURE — 97110 THERAPEUTIC EXERCISES: CPT

## 2022-07-19 PROCEDURE — 97112 NEUROMUSCULAR REEDUCATION: CPT

## 2022-07-19 NOTE — PROGRESS NOTES
Daily Note     Today's date: 2022  Patient name: Reese Mata  : 1940  MRN: [id number]  Referring provider: Miguel Torre MD  Dx:   Encounter Diagnosis     ICD-10-CM    1  SDH (subdural hematoma) ([facility] [address] )  S06 5X9A    2  Balance disorder  R26 89    3  Gait abnormality  R26 9                   Subjective: Pt spouse states he is walking more by himself ~ 10 min every day  He seems to be walking better/safer      Objective: See treatment diary below      Assessment: Tolerated treatment well  Struggles with dual tasking  Noted improved step stride/UE swing with gait  Pt co's of dizziness with descending stairs   Patient demonstrated fatigue post treatment and would benefit from continued PT      Plan: Continue per plan of care        Precautions: fall, HTN, [place]        Re-eval Date: 22    Date      Visit Count 6 7 8     FOTO completed       Pain In        Pain Out            Manuals                                **superimpose manual dual tasks to balance interventions        Neuro Re-Ed        Marching on foam SOLO  HKM   3# AW BL  5x SOLO  HKM   5# AW BL  7x resume     FT Foam EC        Tandem stance (firm)        Rapid toe taps         Static lunge onto Bosu        Ball toss         Hurdles SOLO  3# AW BL  5x fw/lat        Obstacle course  SOLO and w/o SOLO  4" step over  Hiram  Yellow circles  10 min      Retro step SOLO  3# AW BL  5x SOLO  HKM   5# AW BL        Side Step SOLO  3# AW BL  5x SOLO  HKM   5# AW BL        Chair <>chair   10x  Focus step stride, turn negotiation    Step/Turn   10x R/L  Max cues             Ther Ex        NuStep (BUE/LE) L6, 10 min ' L6, 10 min  L6, 10 min      Standing 3 way hip        Standing TR/HR                        Ther Activity        STS repeats (0UE) Yellow foam, arms crossed 10x  resume 5x 3 trials    27sec  W/o UE     Side-stepping        Backwards walking        Step-up repeats (for/lat)        Stair negotiation (big goal for pt)   Stairs 2x  2 HR  reciprocol ascend  1 step pattern descend c/o's dizziness     Hurdles        Gait Training        encourage arm swinging & bigger steps   2 MWT  272 feet  Focus > step stride/arm swing     Uneven surfaces (outside)        Modalities

## 2022-07-20 NOTE — PROGRESS NOTES
Daily Note     Today's date: 2022  Patient name: Bernardo Amaya  : 1940  MRN: [id number]  Referring provider: Elisabeth Murillo MD  Dx:   Encounter Diagnosis     ICD-10-CM    1  SDH (subdural hematoma) ([facility] [address] )  S06 5X9A    2  Balance disorder  R26 89    3  Gait abnormality  R26 9                   Subjective: Pt offers no new co's  See RA for additional findings  Pt spouse states he fatigues easily but does walk 10 min daily  Stairs are still difficult for him 1* coming down  Pt reports L knee pain        Objective: See treatment diary below      Assessment: Tolerated treatment well  Patient would benefit from continued PT      Plan: Continue per plan of care        Precautions: fall, HTN, [place]        Re-eval Date: 22    Date     Visit Count 6 7 8 9    FOTO completed       Pain In        Pain Out            Manuals                                **superimpose manual dual tasks to balance interventions        Neuro Re-Ed        Marching on foam SOLO  HKM   3# AW BL  5x SOLO  HKM   5# AW BL  7x resume     FT Foam EC    Foam and incline   EO/EC  30" ea      Tandem stance (firm)    Heel toe walk  Fwd  8x 10 ft    Rapid toe taps         Static lunge onto Bosu        Ball toss         Hurdles SOLO  3# AW BL  5x fw/lat          Obstacle course  SOLO and w/o SOLO  4" step over  Hiram  Yellow circles  10 min  2 box, 1x 6"  And 2 yellow circles  8 laps    Retro step SOLO  3# AW BL  5x SOLO  HKM   5# AW BL    2x10 feet    Side Step SOLO  3# AW BL  5x SOLO  HKM   5# AW BL    Purple TB  3 x 10 feet ea dir    Chair <>chair   10x  Focus step stride, turn negotiation    Step/Turn   10x R/L  Max cues resume    HKM    4x  Laps  10 feet            Ther Ex        NuStep (BUE/LE) L6, 10 min ' L6, 10 min  L6, 10 min  L6, 10 min     Standing 3 way hip        Standing TR/HR                        Ther Activity        STS repeats (0UE) Yellow foam, arms crossed 10x  resume 5x 3 trials    27sec  W/o UE Side-stepping        Backwards walking        Step-up repeats (for/lat)    8"  10x ea  1 UE  Co's L knee sore    Stair negotiation (big goal for pt)   Stairs 2x  2 HR  reciprocol ascend  1 step pattern descend c/o's dizziness     Hurdles        Gait Training        encourage arm swinging & bigger steps   2 MWT  272 feet  Focus > step stride/arm swing     Uneven surfaces (outside)        Modalities

## 2022-07-21 ENCOUNTER — EVALUATION (OUTPATIENT)
Dept: PHYSICAL THERAPY | Facility: CLINIC | Age: 82
End: 2022-07-21
Payer: MEDICARE

## 2022-07-21 DIAGNOSIS — S06.5XAA SDH (SUBDURAL HEMATOMA): Primary | ICD-10-CM

## 2022-07-21 DIAGNOSIS — R26.89 BALANCE DISORDER: ICD-10-CM

## 2022-07-21 DIAGNOSIS — R26.9 GAIT ABNORMALITY: ICD-10-CM

## 2022-07-21 PROCEDURE — 97112 NEUROMUSCULAR REEDUCATION: CPT

## 2022-07-21 PROCEDURE — 97110 THERAPEUTIC EXERCISES: CPT

## 2022-07-21 NOTE — PROGRESS NOTES
Progress Note     Today's date: 2022  Patient name: Missael Chauhan  : 1940  MRN: [id number]  Referring provider: Collin Doll MD  Dx:   Encounter Diagnosis     ICD-10-CM    1  SDH (subdural hematoma) ([facility] [address] )  S06 5X9A    2  Balance disorder  R26 89    3  Gait abnormality  R26 9                   Subjective: Has L knee pain today and wearing compression brace but no pain currently sitting  Per pt's wife, he has been able to do stairs at home  Pt's goal: improve balance     Assessment: Progress note completed this session  He continues to demo limited endurance since he requires multiple rest breaks throughout the session  He does demo improved LE strength and balance per 5 x STS and TUG  Testing limited due to pt being [facility]  At this time, he is making good progress with PT but continues to be a fall risk and would benefit from continued PT to maximize functional mobility an decrease fall risk  Goals  STGs (to be achieved within 2 weeks)  1  Pt and wife will feel comfortable w/ pt completing stairs w/i home using step-to pattern and handrails w/ wife providing S  - partially MET  2  Pt will improve BLE strength to 4+/5 or greater to increase ease w/ functional mobility  - partially MET per 5 x STS, did not do formal MMT since pt is [place] and has difficulty following commands      LTGs (to be achieved within 8-10 weeks)   1  Pt will be I with HEP at d/c to promote PT carry-over  2  Pt will meet FOTO predicted score  3  Pt will be able to complete stairs using alternating pattern w/ handrail at distant S level  4  Pt will improve TUG time to 15 sec or less to indicate a significant reduction in fall risk  5  Pt will improve 5xSTS time by 10 sec or greater to indicate a significant improvement in functional strength and balance  6  Pt will improve Briggs Balance Scale score to 45/56 or greater to indicate significant improvement in balance and reduction in fall risk   - D/C        Plan  Patient would benefit from: skilled physical therapy  Planned modality interventions: unattended electrical stimulation, thermotherapy: hydrocollator packs and cryotherapy  Planned therapy interventions: abdominal trunk stabilization, aquatic therapy, balance, joint mobilization, manual therapy, massage, Sanders taping, motor coordination training, neuromuscular re-education, patient education, flexibility, functional ROM exercises, gait training, graded activity, graded exercise, graded motor, home exercise program, transfer training, therapeutic training, therapeutic exercise, therapeutic activities, stretching and strengthening  Frequency: 2x week  Duration in weeks: 4  Treatment plan discussed with: patient and PTA (wife)          Objective       Functional outcomes   Functional outcome gait comment: AMBULATION: Decreased arm swing, decreased gait speed, decreased B stride length, decreased B foot clearance, S level     STAIRS: step-to descending possibly due to knee pain, b/l handrails, S level     Outcome Measures IE   7/21 PN   5xSTS 37sec (hands on thighs) 27 sec no UE    TUG 33sec 16 31 sec   Briggs Balance Scale 39/56 ([place], some tasks poor command following, may have effected score) Discontinued    2 MWT   272 ft no AD

## 2022-07-25 ENCOUNTER — OFFICE VISIT (OUTPATIENT)
Dept: FAMILY MEDICINE CLINIC | Facility: CLINIC | Age: 82
End: 2022-07-25
Payer: MEDICARE

## 2022-07-25 VITALS
HEART RATE: 85 BPM | TEMPERATURE: 97.8 F | SYSTOLIC BLOOD PRESSURE: 130 MMHG | WEIGHT: 170.2 LBS | DIASTOLIC BLOOD PRESSURE: 72 MMHG | BODY MASS INDEX: 23.83 KG/M2 | HEIGHT: 71 IN | OXYGEN SATURATION: 97 %

## 2022-07-25 DIAGNOSIS — R26.89 BALANCE DISORDER: ICD-10-CM

## 2022-07-25 DIAGNOSIS — S06.5XAA SDH (SUBDURAL HEMATOMA): Primary | ICD-10-CM

## 2022-07-25 PROCEDURE — 99214 OFFICE O/P EST MOD 30 MIN: CPT | Performed by: FAMILY MEDICINE

## 2022-07-25 NOTE — PROGRESS NOTES
Assessment/Plan:    No problem-specific Assessment & Plan notes found for this encounter  Diagnoses and all orders for this visit:    SDH (subdural hematoma) ([address] )  Comments:  Continues to remain stable  L  side scalp at incision site, scab removed with embedded suture in office today    Balance disorder  Comments:  Continue PT          PHQ-2/9 Depression Screening    Little interest or pleasure in doing things: 0 - not at all  Feeling down, depressed, or hopeless: 0 - not at all  PHQ-2 Score: 0  PHQ-2 Interpretation: Negative depression screen            Subjective:      Patient ID: Sheldon Nunez is a 79 y o  male  Franciscodi Limailda presents accompanied by his wife for follow up of SDH S/P evacuation  He was seen in follow up with neurosurgery via telemedicine  He had a recent repeat CTH which showed stable findings, now change in size or new bleed  Desmond Fisher is attending PT for balance and gait disorder, PT notes reviewed and will need continued PT, no falls  He has been cleared by neurosurgery to drive, he is not operating his riding   His wife notes a persistent scab on the left side of the scalp, she has been using a Q-tip that continues to return  BP continues to be stable, his wife brought a log with normal BP readings, no further hypotension  The following portions of the patient's history were reviewed and updated as appropriate: allergies, current medications, past family history, past medical history, past social history, past surgical history and problem list     Review of Systems   Musculoskeletal: Positive for arthralgias and gait problem  Left knee pain   Neurological: Negative for dizziness, syncope, light-headedness and headaches           Objective:    /72 (BP Location: Left arm, Patient Position: Sitting)   Pulse 85   Temp 97 8 °F (36 6 °C) (Tympanic)   Ht 5' 11" (1 803 m)   Wt 77 2 kg (170 lb 3 2 oz)   SpO2 97%   BMI 23 74 kg/m²      Physical Exam  Vitals and nursing note reviewed  Constitutional:       General: He is not in acute distress  Appearance: Normal appearance  He is not ill-appearing  Cardiovascular:      Rate and Rhythm: Normal rate and regular rhythm  Heart sounds: Normal heart sounds  Pulmonary:      Effort: Pulmonary effort is normal       Breath sounds: Normal breath sounds  Skin:     General: Skin is warm and dry  Comments: Residual scab with suture in place embedded in the skin, removed in the office today  Neurological:      General: No focal deficit present  Mental Status: He is alert and oriented to person, place, and time  Psychiatric:         Mood and Affect: Mood normal          Behavior: Behavior normal          Thought Content:  Thought content normal          Judgment: Judgment normal

## 2022-07-25 NOTE — PROGRESS NOTES
Daily Note     Today's date: 2022  Patient name: Adonis Bueno  : 1940  MRN: [id number]  Referring provider: Sasha Ng MD  Dx:   Encounter Diagnosis     ICD-10-CM    1  SDH (subdural hematoma) ([facility] [address] )  S06 5X9A    2  Balance disorder  R26 89    3  Gait abnormality  R26 9                   Subjective: Pt spouse stated He went to the dr yesterday, everything is going well, but to cont with PT    Objective: See treatment diary below      Assessment: Tolerated treatment well  Progressed POC adding dynavision , ball pass/tap/dynamic reach  Pt with improved step strides fwd  Maintains good ILIANA with retro step but cues for >step stride  Pt with improved STS   Patient would benefit from continued PT      Plan: Continue per plan of care        Precautions: fall, HTN, [place]        Re-eval Date: 22    Date    Visit Count 6 7 8 9 10   FOTO completed       Pain In        Pain Out            Manuals                                **superimpose manual dual tasks to balance interventions        Neuro Re-Ed        Marching on foam SOLO  HKM   3# AW BL  5x SOLO  HKM   5# AW BL  7x resume  resume   FT Foam EC    Foam and incline   EO/EC  30" ea   FT/Foam  Dynamic reach   Dynavision  4 1 min trials  avg 27  >2 2 reaction  Cs   Tandem stance (firm)    Heel toe walk  Fwd  8x 10 ft    Rapid toe taps         Static lunge onto Bosu        Ball toss      Toss/pass/bounce  Firm  5 min     Hurdles SOLO  3# AW BL  5x fw/lat          Obstacle course  SOLO and w/o SOLO  4" step over  Hiram  Yellow circles  10 min  2 box, 1x 6"  And 2 yellow circles  8 laps    Retro step SOLO  3# AW BL  5x SOLO  HKM   5# AW BL    2x10 feet 6x 20 feet   Side Step SOLO  3# AW BL  5x SOLO  HKM   5# AW BL    Purple TB  3 x 10 feet ea dir Purple TB  6 x 20 feet ea dir   Chair <>chair   10x  Focus step stride, turn negotiation    Step/Turn   10x R/L  Max cues resume 6x  4x with carry 3#DB  Focus step stride, turn negotiation    Step/Turn   10x R/L  Min cues   HKM    4x  Laps  10 feet 6x  Laps  20 feet           Ther Ex        NuStep (BUE/LE) L6, 10 min ' L6, 10 min  L6, 10 min  L6, 10 min  L5 10 min    Standing 3 way hip        Standing TR/HR                        Ther Activity        STS repeats (0UE) Yellow foam, arms crossed 10x  resume 5x 3 trials    27sec  W/o UE  5x 3 trial    19 sec   w/o UE   Side-stepping        Backwards walking        Step-up repeats (for/lat)    8"  10x ea  1 UE  Co's L knee sore resume   Stair negotiation (big goal for pt)   Stairs 2x  2 HR  reciprocol ascend  1 step pattern descend c/o's dizziness     Hurdles        Gait Training        encourage arm swinging & bigger steps   2 MWT  272 feet  Focus > step stride/arm swing     Uneven surfaces (outside)        Modalities

## 2022-07-26 ENCOUNTER — OFFICE VISIT (OUTPATIENT)
Dept: PHYSICAL THERAPY | Facility: CLINIC | Age: 82
End: 2022-07-26
Payer: MEDICARE

## 2022-07-26 DIAGNOSIS — R26.9 GAIT ABNORMALITY: ICD-10-CM

## 2022-07-26 DIAGNOSIS — S06.5XAA SDH (SUBDURAL HEMATOMA): Primary | ICD-10-CM

## 2022-07-26 DIAGNOSIS — R26.89 BALANCE DISORDER: ICD-10-CM

## 2022-07-26 PROCEDURE — 97112 NEUROMUSCULAR REEDUCATION: CPT

## 2022-07-26 PROCEDURE — 97110 THERAPEUTIC EXERCISES: CPT

## 2022-07-27 NOTE — PROGRESS NOTES
Daily Note     Today's date: 2022  Patient name: Charlie Graff  : 1940  MRN: [id number]  Referring provider: Jone Nicole MD  Dx:   Encounter Diagnosis     ICD-10-CM    1  SDH (subdural hematoma) ([facility] [address] )  S06 5X9A    2  Balance disorder  R26 89    3  Gait abnormality  R26 9                   Subjective: Pt offers no new co's  Spouse states he is doing good    Objective: See treatment diary below      Assessment: Tolerated treatment well  Patient demonstrated fatigue post treatment and would benefit from continued PT      Plan: Continue per plan of care        Precautions: fall, HTN, [place]        Re-eval Date: 22    Date        Visit Count 11       FOTO        Pain In        Pain Out            Manuals                                **superimpose manual dual tasks to balance interventions        Neuro Re-Ed        Marching on foam resume       FT Foam EC Dynamic reach   Dynavision  A x4 1 min trials  avg 25  >2 2 reaction  Bx2 1 min trials  Cs       Tandem stance (firm)        Rapid toe taps         Static lunge onto Bosu        Ball toss  Toss/pass/bounce  Firm  Find/letter  5 min         Hurdles FwdLat step over 6" block  20' 3 x ea dir       Obstacle course        Retro step        Side Step Purple TB  3 x 20 feet ea dir    [place]  5# 5x ea dir       Chair <>chair 6x  4x with carry 3#DB  Focus step stride, turn negotiation    Step/Turn   10x R/L  Min cues       HKM 6x  Laps  20 feet       Push / pull [place]  10#  10x  firm       Ther Ex        NuStep (BUE/LE) L5 10 min        Standing 3 way hip        Standing TR/HR                        Ther Activity        STS repeats (0UE) 5x 0#  5x 5#w/ bolster  5x 5#w/bolster+5# DB x2         Backwards walking        Step-up repeats (for/lat) resume       Stair negotiation (big goal for pt)        Gait Training        encourage arm swinging & bigger steps        Uneven surfaces (outside)        Modalities

## 2022-07-28 ENCOUNTER — OFFICE VISIT (OUTPATIENT)
Dept: PHYSICAL THERAPY | Facility: CLINIC | Age: 82
End: 2022-07-28
Payer: MEDICARE

## 2022-07-28 DIAGNOSIS — S06.5XAA SDH (SUBDURAL HEMATOMA): Primary | ICD-10-CM

## 2022-07-28 DIAGNOSIS — R26.89 BALANCE DISORDER: ICD-10-CM

## 2022-07-28 DIAGNOSIS — R26.9 GAIT ABNORMALITY: ICD-10-CM

## 2022-07-28 PROCEDURE — 97110 THERAPEUTIC EXERCISES: CPT

## 2022-07-28 PROCEDURE — 97112 NEUROMUSCULAR REEDUCATION: CPT

## 2022-08-02 ENCOUNTER — OFFICE VISIT (OUTPATIENT)
Dept: PHYSICAL THERAPY | Facility: CLINIC | Age: 82
End: 2022-08-02
Payer: MEDICARE

## 2022-08-02 DIAGNOSIS — S06.5XAA SDH (SUBDURAL HEMATOMA): Primary | ICD-10-CM

## 2022-08-02 DIAGNOSIS — R26.89 BALANCE DISORDER: ICD-10-CM

## 2022-08-02 DIAGNOSIS — R26.9 GAIT ABNORMALITY: ICD-10-CM

## 2022-08-02 PROCEDURE — 97112 NEUROMUSCULAR REEDUCATION: CPT

## 2022-08-02 PROCEDURE — 97110 THERAPEUTIC EXERCISES: CPT

## 2022-08-02 NOTE — PROGRESS NOTES
Daily Note     Today's date: 2022  Patient name: Charlie Graff  : 1940  MRN: [id number]  Referring provider: Jone Nicole MD  Dx:   Encounter Diagnosis     ICD-10-CM    1  SDH (subdural hematoma) ([facility] [address] )  S06 5X9A    2  Balance disorder  R26 89    3  Gait abnormality  R26 9                   Subjective: Pt spouse states he is walking more every day  No falls/near misses      Objective: See treatment diary below      Assessment: Tolerated treatment well  Difficulty with HKM with < ground clearance R>L  LOB with activ>CGA/Balance recovery  Progress with foam/rebounder activ  Patient demonstrated fatigue post treatment and would benefit from continued PT      Plan: Continue per plan of care        Precautions: fall, HTN, [place]        Re-eval Date: 22    Date       Visit Count 11 12      FOTO        Pain In        Pain Out            Manuals                                **superimpose manual dual tasks to balance interventions        Neuro Re-Ed        Marching on foam resume       FT Foam EC Dynamic reach   Dynavision  A x4 1 min trials  avg 25  >2 2 reaction  Bx2 1 min trials  Cs resume      Tandem stance (firm)        Rapid toe taps         Static lunge onto Bosu        Ball toss  Toss/pass/bounce  Firm  Find/letter  5 min   Rebounder  Firm/foam  FA, Semi tandem, side step  5 min      Hurdles FwdLat step over 6" block  20' 3 x ea dir FwdLat step over 6" block  20' 3 x ea dir      Obstacle course        Retro step        Side Step Purple TB  3 x 20 feet ea dir    [place]  5# 5x ea dir Purple TB  2x 40 feet ea dir    [place]  5# 5x ea dir      Chair <>chair 6x  4x with carry 3#DB  Focus step stride, turn negotiation    Step/Turn   10x R/L  Min cues 4x with carry 3# grocery bags  Focus step stride, turn negotiation      HKM 6x  Laps  20 feet 4x40 feet  3# AW      Push / pull Raven  10#  10x  firm [place]  10# x5  12# x5  firm      Ther Ex        NuStep (BUE/LE) L5 10 min  L5 10 min Standing 3 way hip        Standing TR/HR                        Ther Activity        STS repeats (0UE) 5x 0#  5x 5#w/ bolster  5x 5#w/bolster+5# DB x2   5x 0#  5x 5#w/ bolster   Trial foam     Backwards walking        Step-up repeats (for/lat) resume       Stair negotiation (big goal for pt)        Gait Training        encourage arm swinging & bigger steps        Uneven surfaces (outside)        Modalities

## 2022-08-03 NOTE — PROGRESS NOTES
Daily Note     Today's date: 2022  Patient name: Rani Iraheta  : 1940  MRN: [id number]  Referring provider: Gerson Young MD  Dx:   Encounter Diagnosis     ICD-10-CM    1  SDH (subdural hematoma) ([address] )  S06 5X9A    2  Balance disorder  R26 89    3  Gait abnormality  R26 9                   Subjective: No new co's Pt spouse states he seems more confident walking alone outside        Objective: See treatment diary below      Assessment: Tolerated treatment well  Progress ambul outside with pt demonstrating overall improvement with step stride, > ILIANA, > arm swing  conts to avoid scanning environment  Difficulty with STS with foam under feet, required 1 UE Asst  Patient demonstrated fatigue post treatment and would benefit from continued PT      Plan: Continue per plan of care        Precautions: fall, HTN, [place]        Re-eval Date: 22    Date      Visit Count 11 12 13     FOTO        Pain In        Pain Out            Manuals                                **superimpose manual dual tasks to balance interventions        Neuro Re-Ed        Marching on foam resume       FT Foam EC Dynamic reach   Dynavision  A x4 1 min trials  avg 25  >2 2 reaction  Bx2 1 min trials  Cs resume Dynamic reach   Dynavision  A x4 1 min trials  Best score 28  x2 1 min trials  FA / Foam  Cs     Tandem stance (firm)        Rapid toe taps         Static lunge onto Bosu        Ball toss  Toss/pass/bounce  Firm  Find/letter  5 min   Rebounder  Firm/foam  FA, Semi tandem, side step  5 min      Hurdles FwdLat step over 6" block  20' 3 x ea dir FwdLat step over 6" block  20' 3 x ea dir Resume  FwdLat step over 6" block  20' 3 x ea dir     Obstacle course        Retro step        Side Step Purple TB  3 x 20 feet ea dir    [place]  5# 5x ea dir Purple TB  2x 40 feet ea dir    [place]  5# 5x ea dir [place]  5# 5x ea dir  foam     Chair <>chair 6x  4x with carry 3#DB  Focus step stride, turn negotiation    Step/Turn   10x R/L  Min cues 4x with carry 3# grocery bags  Focus step stride, turn negotiation      HKM 6x  Laps  20 feet 4x40 feet  3# AW 4x40 feet  3# AW     Push / pull [place]  10#  10x  firm [place]  10# x5  12# x5  firm [place]  10# x5  12# x5  foam     Ther Ex        NuStep (BUE/LE) L5 10 min  L5 10 min  L5 10 min      Standing 3 way hip        Standing TR/HR                        Ther Activity        STS repeats (0UE) 5x 0#  5x 5#w/ bolster  5x 5#w/bolster+5# DB x2   5x 0#  5x 5#w/ bolster   5x 0#  5x 5#w/ bolster  5x  w/ foam under feet pt unable w/o 1UE     Backwards walking        Step-up repeats (for/lat) resume       Stair negotiation (big goal for pt)        Gait Training        encourage arm swinging & bigger steps   Outside  W/o AD  Even/uneven, curb negotiation, dynamic reach to ground, STS low bench, up/down incline, step over > 6" obstacles  10 min  CS     Uneven surfaces (outside)        Modalities

## 2022-08-04 ENCOUNTER — OFFICE VISIT (OUTPATIENT)
Dept: PHYSICAL THERAPY | Facility: CLINIC | Age: 82
End: 2022-08-04
Payer: MEDICARE

## 2022-08-04 DIAGNOSIS — S06.5XAA SDH (SUBDURAL HEMATOMA): Primary | ICD-10-CM

## 2022-08-04 DIAGNOSIS — R26.9 GAIT ABNORMALITY: ICD-10-CM

## 2022-08-04 DIAGNOSIS — R26.89 BALANCE DISORDER: ICD-10-CM

## 2022-08-04 PROCEDURE — 97116 GAIT TRAINING THERAPY: CPT

## 2022-08-04 PROCEDURE — 97112 NEUROMUSCULAR REEDUCATION: CPT

## 2022-08-04 PROCEDURE — 97110 THERAPEUTIC EXERCISES: CPT

## 2022-08-09 ENCOUNTER — OFFICE VISIT (OUTPATIENT)
Dept: PHYSICAL THERAPY | Facility: CLINIC | Age: 82
End: 2022-08-09
Payer: MEDICARE

## 2022-08-09 DIAGNOSIS — R26.89 BALANCE DISORDER: ICD-10-CM

## 2022-08-09 DIAGNOSIS — R26.9 GAIT ABNORMALITY: ICD-10-CM

## 2022-08-09 DIAGNOSIS — S06.5XAA SDH (SUBDURAL HEMATOMA): Primary | ICD-10-CM

## 2022-08-09 PROCEDURE — 97110 THERAPEUTIC EXERCISES: CPT

## 2022-08-09 PROCEDURE — 97112 NEUROMUSCULAR REEDUCATION: CPT

## 2022-08-09 NOTE — PROGRESS NOTES
Daily Note     Today's date: 2022  Patient name: Marta Snow  : 1940  MRN: [id number]  Referring provider: Zheng Jorge MD  Dx:   Encounter Diagnosis     ICD-10-CM    1  SDH (subdural hematoma) ([facility] [address] )  S06 5X9A    2  Balance disorder  R26 89    3  Gait abnormality  R26 9                   Subjective: Pt offers no new co's      Objective: See treatment diary below      Assessment: Tolerated treatment well  1 LOB with CGA / balance recovery  Difficulty with side step over obstacles  Patient demonstrated fatigue post treatment and would benefit from continued PT      Plan: Continue per plan of care        Precautions: fall, HTN, [place]        Re-eval Date: 22    Date     Visit Count 11 12 13 14    FOTO        Pain In        Pain Out            Manuals                                **superimpose manual dual tasks to balance interventions        Neuro Re-Ed        Marching on foam resume       FT Foam EC Dynamic reach   Dynavision  A x4 1 min trials  avg 25  >2 2 reaction  Bx2 1 min trials  Cs resume Dynamic reach   Dynavision  A x4 1 min trials  Best score 28  x2 1 min trials  FA / Foam  Cs     Tandem stance (firm)        Rapid toe taps         Static lunge onto Bosu        Ball toss  Toss/pass/bounce  Firm  Find/letter  5 min   Rebounder  Firm/foam  FA, Semi tandem, side step  5 min      Hurdles FwdLat step over 6" block  20' 3 x ea dir FwdLat step over 6" block  20' 3 x ea dir Resume       Obstacle course    FwdLat step over 6" block  3 yellow circles Fwd only, 8" step up/down  Carry 5# grocery bag  10 min  occ brief seated rest    Retro step        Side Step Purple TB  3 x 20 feet ea dir    [place]  5# 5x ea dir Purple TB  2x 40 feet ea dir    [place]  5# 5x ea dir [place]  5# 5x ea dir  foam [place]  6# 5x ea dir  foam    Chair <>chair 6x  4x with carry 3#DB  Focus step stride, turn negotiation    Step/Turn   10x R/L  Min cues 4x with carry 3# grocery bags  Focus step stride, turn negotiation      HKM 6x  Laps  20 feet 4x40 feet  3# AW 4x40 feet  3# AW resume    Push / pull [place]  10#  10x  firm [place]  10# x5  12# x5  firm [place]  10# x5  12# x5  foam [place]  10# 10x  12# 5x  Foam    Ther Ex        NuStep (BUE/LE) L5 10 min  L5 10 min  L5 10 min  L5 10 min    Standing 3 way hip        Standing TR/HR                        Ther Activity        STS repeats (0UE) 5x 0#  5x 5#w/ bolster  5x 5#w/bolster+5# DB x2   5x 0#  5x 5#w/ bolster   5x 0#  5x 5#w/ bolster  5x  w/ foam under feet pt unable w/o 1UE     Backwards walking        Step-up repeats (for/lat) resume       Stair negotiation (big goal for pt)        Gait Training        encourage arm swinging & bigger steps   Outside  W/o AD  Even/uneven, curb negotiation, dynamic reach to ground, STS low bench, up/down incline, step over > 6" obstacles  10 min  CS     Uneven surfaces (outside)        Modalities

## 2022-08-11 ENCOUNTER — OFFICE VISIT (OUTPATIENT)
Dept: PHYSICAL THERAPY | Facility: CLINIC | Age: 82
End: 2022-08-11
Payer: MEDICARE

## 2022-08-11 DIAGNOSIS — R26.9 GAIT ABNORMALITY: ICD-10-CM

## 2022-08-11 DIAGNOSIS — R26.89 BALANCE DISORDER: ICD-10-CM

## 2022-08-11 DIAGNOSIS — S06.5XAA SDH (SUBDURAL HEMATOMA): Primary | ICD-10-CM

## 2022-08-11 PROCEDURE — 97110 THERAPEUTIC EXERCISES: CPT

## 2022-08-11 PROCEDURE — 97116 GAIT TRAINING THERAPY: CPT

## 2022-08-11 PROCEDURE — 97112 NEUROMUSCULAR REEDUCATION: CPT

## 2022-08-11 NOTE — PROGRESS NOTES
Daily Note     Today's date: 2022  Patient name: Sharyn Taylor  : 1940  MRN: [id number]  Referring provider: Anish Garcia MD  Dx:   Encounter Diagnosis     ICD-10-CM    1  SDH (subdural hematoma) ([facility] [address] )  S06 5X9A    2  Balance disorder  R26 89    3  Gait abnormality  R26 9                   Subjective: Pt offers no new co's  Denies any recent fall/near miss, denies any increase pain/discomfort  Spouse states interest in both participating in community wellness after DC PT      Objective: See treatment diary below      Assessment: Tolerated treatment well  Pt demonstrates improved step stride, > ILIANA  Pt without LOB with progression of neuro challenges  Patient demonstrated fatigue post treatment and would benefit from continued PT      Plan: Continue per plan of care   x1 week with possible transition to HEP/community wellness     Precautions: fall, HTN, [place]        Re-eval Date: 22    Date    Visit Count 11 12 13 14 15   FOTO     completed   Pain In        Pain Out            Manuals                                **superimpose manual dual tasks to balance interventions        Neuro Re-Ed        Marching on foam resume       FT Foam EC Dynamic reach   Dynavision  A x4 1 min trials  avg 25  >2 2 reaction  Bx2 1 min trials  Cs resume Dynamic reach   Dynavision  A x4 1 min trials  Best score 28  x2 1 min trials  FA / Foam  Cs  Dynamic reach   D[employer]  A x4 1 min trials  avg 30     Tandem stance (firm)     Semi tandem  With alt UE pull down  [place]  5#  2x10    Rapid toe taps         Static lunge onto [place]u        Ball toss  Toss/pass/bounce  Firm  Find/letter  5 min   Rebounder  Firm/foam  FA, Semi tandem, side step  5 min   STS with ball toss, ltr/# find  10x   Hurdles FwdLat step over 6" block  20' 3 x ea dir FwdLat step over 6" block  20' 3 x ea dir Resume       Obstacle course    FwdLat step over 6" block  3 yellow circles Fwd only, 8" step up/down  Carry 5# grocery bag  10 min  occ brief seated rest FwdLat step over 6" block  3 yellow circles Fwd only, 8" step up/down  Carry 5/10/15# grocery bag  Crate carry /dynamic lift floor<>waist  15 min  2 brief seated rest   Retro step        Side Step [place]  3 x 20 feet ea dir    [place]  5# 5x ea dir [place]  2x 40 feet ea dir    [place]  5# 5x ea dir [place]  5# 5x ea dir  foam [place]  6# 5x ea dir  foam [place]  6# 5x ea dir  foam   Chair <>chair 6x  4x with carry 3#DB  Focus step stride, turn negotiation    Step/Turn   10x R/L  Min cues 4x with carry 3# grocery bags  Focus step stride, turn negotiation   10x with carry 5/10/15# grocery bags  Focus step stride, turn negotiation   HKM 6x  Laps  20 feet 4x40 feet  3# AW 4x40 feet  3# AW resume    Push / pull [place]  10#  10x  firm [place]  10# x5  12# x5  firm [place]  10# x5  12# x5  foam [place]  10# 10x  12# 5x  Foam resume   Ther Ex        NuStep (BUE/LE) L5 10 min  L5 10 min  L5 10 min  L5 10 min L5 10 min   Standing 3 way hip        Standing TR/HR                        Ther Activity        STS repeats (0UE) 5x 0#  5x 5#w/ bolster  5x 5#w/bolster+5# DB x2   5x 0#  5x 5#w/ bolster   5x 0#  5x 5#w/ bolster  5x  w/ foam under feet pt unable w/o 1UE  5x 0#  2x ea 5/10/15#w/ grocery bag  5x    5x  w/ foam under feet    Backwards walking        Step-up repeats (for/lat) resume       Stair negotiation (big goal for pt)        Gait Training        encourage arm swinging & bigger steps   Outside  W/o AD  Even/uneven, curb negotiation, dynamic reach to ground, STS low bench, up/down incline, step over > 6" obstacles  10 min  CS  Clinic/lobby  Focus > step stride, ILIANA, reciprocal UE/LE  8 min       Uneven surfaces (outside)        Modalities

## 2022-08-15 NOTE — PROGRESS NOTES
Daily Note     Today's date: 2022  Patient name: Charlie Graff  : 1940  MRN: [id number]  Referring provider: Jone Nicole MD  Dx:   Encounter Diagnosis     ICD-10-CM    1  SDH (subdural hematoma) ([facility] [address] )  S06 5X9A    2  Balance disorder  R26 89    3  Gait abnormality  R26 9                   Subjective: Pt spouse reports he is co's of knee pain today which acts up every now and then        Objective: See treatment diary below      Assessment: Tolerated treatment well    Pt w/o LOB with advancing neuro activities  Pt with noted > ILIANA, step stride and ground clearance with ambul  Review HEP  Patient demonstrated fatigue post treatment and would benefit from continued PT      Plan: Possible DC to HEP/community wellness end of week  Review/update HEP     Precautions: fall, HTN, [place]        Re-eval Date: 22    Date        Visit Count 16       FOTO        Pain In        Pain Out            Manuals                                **superimpose manual dual tasks to balance interventions        Neuro Re-Ed        Marching on foam        FT Foam EC Dynamic activ    Bean bag toss  Fwd/retro/side step  With raven 7#  5 min  Firm/foam         Tandem stance (firm) Semi tandem  With alt UE pull down  [place]  5#  2x10        Rapid toe taps         Static lunge onto [employer] toss  STS with ball toss, ltr/# find  10x       Hurdles        Obstacle course FwdLat step over 6" block  3 yellow circles Fwd only, 8" step up/down  Carry 5/10/15# grocery bag  Crate carry /dynamic lift floor<>waist  15 min  2 brief seated rest           Retro step        Side Step [place]  7# 5x ea dir  foam       Chair <>chair 10x with carry 5/10/15# grocery bags  Focus step stride, turn negotiation       HKM        Push / pull 10#, 12, 14, 16, 18 20#  1 lap ea  foam       Ther Ex        NuStep (BUE/LE) L5 10 min       Standing 3 way hip        Standing TR/HR                        Ther Activity        STS repeats (0UE) 5x 0#  2x ea 5/10/15#w/ grocery bag  5x    5x  w/ foam under feet        Backwards walking        Step-up repeats (for/lat)        Stair negotiation (big goal for pt)        Gait Training        encourage arm swinging & bigger steps Clinic/lobby  Focus > step stride, ILIANA, reciprocal UE/LE  8 min           Uneven surfaces (outside)        Modalities

## 2022-08-16 ENCOUNTER — OFFICE VISIT (OUTPATIENT)
Dept: PHYSICAL THERAPY | Facility: CLINIC | Age: 82
End: 2022-08-16
Payer: MEDICARE

## 2022-08-16 DIAGNOSIS — R26.89 BALANCE DISORDER: ICD-10-CM

## 2022-08-16 DIAGNOSIS — S06.5XAA SDH (SUBDURAL HEMATOMA): Primary | ICD-10-CM

## 2022-08-16 DIAGNOSIS — R26.9 GAIT ABNORMALITY: ICD-10-CM

## 2022-08-16 PROCEDURE — 97110 THERAPEUTIC EXERCISES: CPT

## 2022-08-16 PROCEDURE — 97112 NEUROMUSCULAR REEDUCATION: CPT

## 2022-08-17 ENCOUNTER — RA CDI HCC (OUTPATIENT)
Dept: OTHER | Facility: HOSPITAL | Age: 82
End: 2022-08-17

## 2022-08-17 NOTE — PROGRESS NOTES
Daily Note     Today's date: 2022  Patient name: Betty Garcia  : 1940  MRN: [id number]  Referring provider: Bishop Jesse MD  Dx:   Encounter Diagnosis     ICD-10-CM    1  SDH (subdural hematoma) ([facility] [address] )  S06 5X9A    2  Balance disorder  R26 89    3   Gait abnormality  R26 9                   Subjective: No new co's  Pt /spouse possible interest in joining community wellness        Objective: See treatment diary below      Assessment: Tolerated treatment well  1* focus review HEP  Patient exhibited good technique with therapeutic exercises      Plan: DC to HEP/community wellness program     Precautions: fall, HTN, [place]        Re-eval Date: 22    Date       Visit Count 16 17      FOTO        Pain In        Pain Out            Manuals                                **superimpose manual dual tasks to balance interventions        Neuro Re-Ed        Marching on foam        FT Foam EC Dynamic activ    Bean bag toss  Fwd/retro/side step  With raven 7#  5 min  Firm/foam   Dynamic activ    Bean bag toss  Fwd/retro/side step  With raven 7#  5 min  Firm/foam      Tandem stance (firm) Semi tandem  With alt UE pull down  [place]  5#  2x10        Rapid toe taps         Static lunge onto [employer] toss  STS with ball toss, ltr/# find  10x       Hurdles        Obstacle course FwdLat step over 6" block  3 yellow circles Fwd only, 8" step up/down  Carry 5/10/15# grocery bag  Crate carry /dynamic lift floor<>waist  15 min  2 brief seated rest           Retro step        Side Step [place]  7# 5x ea dir  foam [place]  7# 5x ea dir  foam      Chair <>chair 10x with carry 5/10/15# grocery bags  Focus step stride, turn negotiation       HKM        Push / pull 10#, 12, 14, 16, 18 20#  1 lap ea  foam 10#, 12, 14, 16, 18 20#  1 lap ea  foam      Ther Ex        NuStep (BUE/LE) L5 10 min L5 10 min      Standing 3 way hip        Standing TR/HR                        Ther Activity        STS repeats (0UE) 5x 0#  2x ea 5/10/15#w/ grocery bag  5x    5x  w/ foam under feet        Backwards walking        Step-up repeats (for/lat)        Stair negotiation (big goal for pt)        Gait Training        encourage arm swinging & bigger steps Clinic/lobby  Focus > step stride, ILIANA, reciprocal UE/LE  8 min     Outside  Curb neg  Uneven surface  HT/HN  STS low bench/[place] chair  Incline/decline  Firm/uneven  Step over 6" obstacle     15 min       Uneven surfaces (outside)        Modalities

## 2022-08-18 ENCOUNTER — OFFICE VISIT (OUTPATIENT)
Dept: PHYSICAL THERAPY | Facility: CLINIC | Age: 82
End: 2022-08-18
Payer: MEDICARE

## 2022-08-18 ENCOUNTER — APPOINTMENT (OUTPATIENT)
Dept: LAB | Facility: CLINIC | Age: 82
End: 2022-08-18
Payer: MEDICARE

## 2022-08-18 DIAGNOSIS — R26.9 GAIT ABNORMALITY: ICD-10-CM

## 2022-08-18 DIAGNOSIS — S06.5XAA SDH (SUBDURAL HEMATOMA): Primary | ICD-10-CM

## 2022-08-18 DIAGNOSIS — R03.0 BORDERLINE HYPERTENSION: ICD-10-CM

## 2022-08-18 DIAGNOSIS — R26.89 BALANCE DISORDER: ICD-10-CM

## 2022-08-18 DIAGNOSIS — E78.00 HYPERCHOLESTEROLEMIA: ICD-10-CM

## 2022-08-18 PROBLEM — H90.3 SENSORINEURAL HEARING LOSS (SNHL), BILATERAL: Status: ACTIVE | Noted: 2022-08-18

## 2022-08-18 LAB
ALBUMIN SERPL BCP-MCNC: 3.6 G/DL (ref 3.5–5)
ALP SERPL-CCNC: 89 U/L (ref 46–116)
ALT SERPL W P-5'-P-CCNC: 39 U/L (ref 12–78)
ANION GAP SERPL CALCULATED.3IONS-SCNC: 7 MMOL/L (ref 4–13)
AST SERPL W P-5'-P-CCNC: 20 U/L (ref 5–45)
BASOPHILS # BLD AUTO: 0.04 THOUSANDS/ΜL (ref 0–0.1)
BASOPHILS NFR BLD AUTO: 0 % (ref 0–1)
BILIRUB SERPL-MCNC: 0.55 MG/DL (ref 0.2–1)
BUN SERPL-MCNC: 17 MG/DL (ref 5–25)
CALCIUM SERPL-MCNC: 9.5 MG/DL (ref 8.3–10.1)
CHLORIDE SERPL-SCNC: 108 MMOL/L (ref 96–108)
CHOLEST SERPL-MCNC: 183 MG/DL
CO2 SERPL-SCNC: 26 MMOL/L (ref 21–32)
CREAT SERPL-MCNC: 0.87 MG/DL (ref 0.6–1.3)
EOSINOPHIL # BLD AUTO: 0.18 THOUSAND/ΜL (ref 0–0.61)
EOSINOPHIL NFR BLD AUTO: 2 % (ref 0–6)
ERYTHROCYTE [DISTWIDTH] IN BLOOD BY AUTOMATED COUNT: 13.7 % (ref 11.6–15.1)
GFR SERPL CREATININE-BSD FRML MDRD: 80 ML/MIN/1.73SQ M
GLUCOSE P FAST SERPL-MCNC: 92 MG/DL (ref 65–99)
HCT VFR BLD AUTO: 47.2 % (ref 36.5–49.3)
HDLC SERPL-MCNC: 69 MG/DL
HGB BLD-MCNC: 15 G/DL (ref 12–17)
IMM GRANULOCYTES # BLD AUTO: 0.03 THOUSAND/UL (ref 0–0.2)
IMM GRANULOCYTES NFR BLD AUTO: 0 % (ref 0–2)
LDLC SERPL CALC-MCNC: 104 MG/DL (ref 0–100)
LYMPHOCYTES # BLD AUTO: 1.56 THOUSANDS/ΜL (ref 0.6–4.47)
LYMPHOCYTES NFR BLD AUTO: 16 % (ref 14–44)
MCH RBC QN AUTO: 31.3 PG (ref 26.8–34.3)
MCHC RBC AUTO-ENTMCNC: 31.8 G/DL (ref 31.4–37.4)
MCV RBC AUTO: 99 FL (ref 82–98)
MONOCYTES # BLD AUTO: 0.85 THOUSAND/ΜL (ref 0.17–1.22)
MONOCYTES NFR BLD AUTO: 9 % (ref 4–12)
NEUTROPHILS # BLD AUTO: 6.89 THOUSANDS/ΜL (ref 1.85–7.62)
NEUTS SEG NFR BLD AUTO: 73 % (ref 43–75)
NONHDLC SERPL-MCNC: 114 MG/DL
NRBC BLD AUTO-RTO: 0 /100 WBCS
PLATELET # BLD AUTO: 410 THOUSANDS/UL (ref 149–390)
PMV BLD AUTO: 9.6 FL (ref 8.9–12.7)
POTASSIUM SERPL-SCNC: 4.5 MMOL/L (ref 3.5–5.3)
PROT SERPL-MCNC: 7.6 G/DL (ref 6.4–8.4)
RBC # BLD AUTO: 4.79 MILLION/UL (ref 3.88–5.62)
SODIUM SERPL-SCNC: 141 MMOL/L (ref 135–147)
TRIGL SERPL-MCNC: 50 MG/DL
WBC # BLD AUTO: 9.55 THOUSAND/UL (ref 4.31–10.16)

## 2022-08-18 PROCEDURE — 80053 COMPREHEN METABOLIC PANEL: CPT

## 2022-08-18 PROCEDURE — 97110 THERAPEUTIC EXERCISES: CPT

## 2022-08-18 PROCEDURE — 36415 COLL VENOUS BLD VENIPUNCTURE: CPT

## 2022-08-18 PROCEDURE — 97116 GAIT TRAINING THERAPY: CPT

## 2022-08-18 PROCEDURE — 85025 COMPLETE CBC W/AUTO DIFF WBC: CPT

## 2022-08-18 PROCEDURE — 80061 LIPID PANEL: CPT

## 2022-08-18 PROCEDURE — 97112 NEUROMUSCULAR REEDUCATION: CPT

## 2022-08-23 ENCOUNTER — APPOINTMENT (OUTPATIENT)
Dept: PHYSICAL THERAPY | Facility: CLINIC | Age: 82
End: 2022-08-23
Payer: MEDICARE

## 2022-08-25 ENCOUNTER — APPOINTMENT (OUTPATIENT)
Dept: PHYSICAL THERAPY | Facility: CLINIC | Age: 82
End: 2022-08-25
Payer: MEDICARE

## 2022-08-30 ENCOUNTER — APPOINTMENT (OUTPATIENT)
Dept: PHYSICAL THERAPY | Facility: CLINIC | Age: 82
End: 2022-08-30
Payer: MEDICARE

## 2022-09-01 ENCOUNTER — OFFICE VISIT (OUTPATIENT)
Dept: FAMILY MEDICINE CLINIC | Facility: CLINIC | Age: 82
End: 2022-09-01
Payer: MEDICARE

## 2022-09-01 VITALS
DIASTOLIC BLOOD PRESSURE: 68 MMHG | TEMPERATURE: 97.7 F | WEIGHT: 171.38 LBS | HEIGHT: 71 IN | HEART RATE: 80 BPM | SYSTOLIC BLOOD PRESSURE: 130 MMHG | OXYGEN SATURATION: 97 % | BODY MASS INDEX: 23.99 KG/M2

## 2022-09-01 DIAGNOSIS — E78.00 HYPERCHOLESTEROLEMIA: Chronic | ICD-10-CM

## 2022-09-01 DIAGNOSIS — Z00.00 ENCOUNTER FOR SUBSEQUENT ANNUAL WELLNESS VISIT (AWV) IN MEDICARE PATIENT: Primary | ICD-10-CM

## 2022-09-01 DIAGNOSIS — H90.3 SENSORINEURAL HEARING LOSS (SNHL), BILATERAL: ICD-10-CM

## 2022-09-01 DIAGNOSIS — S06.5XAA SDH (SUBDURAL HEMATOMA): ICD-10-CM

## 2022-09-01 DIAGNOSIS — R03.0 BORDERLINE HYPERTENSION: ICD-10-CM

## 2022-09-01 DIAGNOSIS — R26.89 BALANCE DISORDER: ICD-10-CM

## 2022-09-01 PROCEDURE — 99214 OFFICE O/P EST MOD 30 MIN: CPT | Performed by: FAMILY MEDICINE

## 2022-09-01 PROCEDURE — G0439 PPPS, SUBSEQ VISIT: HCPCS | Performed by: FAMILY MEDICINE

## 2022-09-01 NOTE — PATIENT INSTRUCTIONS
Medicare Preventive Visit Patient Instructions  Thank you for completing your Welcome to Medicare Visit or Medicare Annual Wellness Visit today  Your next wellness visit will be due in one year (9/2/2023)  The screening/preventive services that you may require over the next 5-10 years are detailed below  Some tests may not apply to you based off risk factors and/or age  Screening tests ordered at today's visit but not completed yet may show as past due  Also, please note that scanned in results may not display below  Preventive Screenings:  Service Recommendations Previous Testing/Comments   Colorectal Cancer Screening  · Colonoscopy    · Fecal Occult Blood Test (FOBT)/Fecal Immunochemical Test (FIT)  · Fecal DNA/Cologuard Test  · Flexible Sigmoidoscopy Age: 39-70 years old   Colonoscopy: every 10 years (May be performed more frequently if at higher risk)  OR  FOBT/FIT: every 1 year  OR  Cologuard: every 3 years  OR  Sigmoidoscopy: every 5 years  Screening may be recommended earlier than age 39 if at higher risk for colorectal cancer  Also, an individualized decision between you and your healthcare provider will decide whether screening between the ages of 74-80 would be appropriate   Colonoscopy: Not on file  FOBT/FIT: Not on file  Cologuard: Not on file  Sigmoidoscopy: Not on file          Prostate Cancer Screening Individualized decision between patient and health care provider in men between ages of 53-78   Medicare will cover every 12 months beginning on the day after your 50th birthday PSA: 0 5 ng/mL     Screening Not Indicated     Hepatitis C Screening Once for adults born between [facility]  More frequently in patients at high risk for Hepatitis C Hep C Antibody: Not on file        Diabetes Screening 1-2 times per year if you're at risk for diabetes or have pre-diabetes Fasting glucose: 92 mg/dL (8/18/2022)  A1C: No results in last 5 years (No results in last 5 years)  Screening Current   Cholesterol Screening Once every 5 years if you don't have a lipid disorder  May order more often based on risk factors  Lipid panel: 08/18/2022  Screening Not Indicated  History Lipid Disorder      Other Preventive Screenings Covered by Medicare:  1  Abdominal Aortic Aneurysm (AAA) Screening: covered once if your at risk  You're considered to be at risk if you have a family history of AAA or a male between the age of 73-68 who smoking at least 100 cigarettes in your lifetime  2  Lung Cancer Screening: covers low dose CT scan once per year if you meet all of the following conditions: (1) Age 50-69; (2) No signs or symptoms of lung cancer; (3) Current smoker or have quit smoking within the last 15 years; (4) You have a tobacco smoking history of at least 20 pack years (packs per day x number of years you smoked); (5) You get a written order from a healthcare provider  3  Glaucoma Screening: covered annually if you're considered high risk: (1) You have diabetes OR (2) Family history of glaucoma OR (3)  aged 48 and older OR (3)  American aged 72 and older  3  Osteoporosis Screening: covered every 2 years if you meet one of the following conditions: (1) Have a vertebral abnormality; (2) On glucocorticoid therapy for more than 3 months; (3) Have primary hyperparathyroidism; (4) On osteoporosis medications and need to assess response to drug therapy  5  HIV Screening: covered annually if you're between the age of 12-76  Also covered annually if you are younger than 13 and older than 72 with risk factors for HIV infection  For pregnant patients, it is covered up to 3 times per pregnancy      Immunizations:  Immunization Recommendations   Influenza Vaccine Annual influenza vaccination during flu season is recommended for all persons aged >= 6 months who do not have contraindications   Pneumococcal Vaccine   * Pneumococcal conjugate vaccine = PCV13 (Prevnar 13), PCV15 (Vaxneuvance), PCV20 (Prevnar 20)  * Pneumococcal polysaccharide vaccine = PPSV23 (Pneumovax) Adults 2364 years old: 1-3 doses may be recommended based on certain risk factors  Adults 72 years old: 1-2 doses may be recommended based off what pneumonia vaccine you previously received   Hepatitis B Vaccine 3 dose series if at intermediate or high risk (ex: diabetes, end stage renal disease, liver disease)   Tetanus (Td) Vaccine - COST NOT COVERED BY MEDICARE PART B Following completion of primary series, a booster dose should be given every 10 years to maintain immunity against tetanus  Td may also be given as tetanus wound prophylaxis  Tdap Vaccine - COST NOT COVERED BY MEDICARE PART B Recommended at least once for all adults  For pregnant patients, recommended with each pregnancy  Shingles Vaccine (Shingrix) - COST NOT COVERED BY MEDICARE PART B  2 shot series recommended in those aged 48 and above     Health Maintenance Due:  There are no preventive care reminders to display for this patient  Immunizations Due:      Topic Date Due    COVID-19 Vaccine (4 - Booster for Moderna series) 03/01/2022    Influenza Vaccine (1) 09/01/2022     Advance Directives   What are advance directives? Advance directives are legal documents that state your wishes and plans for medical care  These plans are made ahead of time in case you lose your ability to make decisions for yourself  Advance directives can apply to any medical decision, such as the treatments you want, and if you want to donate organs  What are the types of advance directives? There are many types of advance directives, and each state has rules about how to use them  You may choose a combination of any of the following:  · Living will: This is a written record of the treatment you want  You can also choose which treatments you do not want, which to limit, and which to stop at a certain time  This includes surgery, medicine, IV fluid, and tube feedings     · Durable power of  for [facility]): This is a written record that states who you want to make healthcare choices for you when you are unable to make them for yourself  This person, called a proxy, is usually a family member or a friend  You may choose more than 1 proxy  · Do not resuscitate (DNR) order:  A DNR order is used in case your heart stops beating or you stop breathing  It is a request not to have certain forms of treatment, such as CPR  A DNR order may be included in other types of advance directives  · Medical directive: This covers the care that you want if you are in a coma, near death, or unable to make decisions for yourself  You can list the treatments you want for each condition  Treatment may include pain medicine, surgery, blood transfusions, dialysis, IV or tube feedings, and a ventilator (breathing machine)  · Values history: This document has questions about your views, beliefs, and how you feel and think about life  This information can help others choose the care that you would choose  Why are advance directives important? An advance directive helps you control your care  Although spoken wishes may be used, it is better to have your wishes written down  Spoken wishes can be misunderstood, or not followed  Treatments may be given even if you do not want them  An advance directive may make it easier for your family to make difficult choices about your care  Fall Prevention    Fall prevention  includes ways to make your home and other areas safer  It also includes ways you can move more carefully to prevent a fall  Health conditions that cause changes in your blood pressure, vision, or muscle strength and coordination may increase your risk for falls  Medicines may also increase your risk for falls if they make you dizzy, weak, or sleepy  Fall prevention tips:   · Stand or sit up slowly  · Use assistive devices as directed  · Wear shoes that fit well and have soles that       · Wear a personal alarm  · Stay active  · Manage your medical conditions  Home Safety Tips:  · Add items to prevent falls in the bathroom  · Keep paths clear  · Install bright lights in your home  · Keep items you use often on shelves within reach  · Paint or place reflective tape on the edges of your stairs  © Copyright [employer] 2018 Information is for End User's use only and may not be sold, redistributed or otherwise used for commercial purposes   All illustrations and images included in CareNotes® are the copyrighted property of A D A M , Inc  or [address] [place] [address]

## 2022-09-01 NOTE — PROGRESS NOTES
Assessment and Plan:     Quentin Henry was seen today for medicare wellness visit  Diagnoses and all orders for this visit:    Encounter for subsequent annual wellness visit (AWV) in Medicare patient    Borderline hypertension  Comments:  BP is well controlled    SDH (subdural hematoma) ([address] )  Comments: Will have repeat CT and follow up Neurosurgery, completed PT, continuing HEP    Hypercholesterolemia  Comments:  Lipids well controlled    Balance disorder  Comments:  Completed PT 8/18, continuing HEP    Sensorineural hearing loss (SNHL), bilateral  Comments:  Considering cochlear implant, ENT notes reviewed         Preventive health issues were discussed with patient, and age appropriate screening tests were ordered as noted in patient's After Visit Summary  Personalized health advice and appropriate referrals for health education or preventive services given if needed, as noted in patient's After Visit Summary  History of Present Illness:     Patient presents for a Medicare Wellness Visit    Patient presents for or Medicare wellness visit as well as follow-up of his chronic conditions lab review  He did see ENT and evaluation for sensorineural hearing loss for which a cochlear implants being considered  He completed physical therapy, his balance has improved but is still not 100%, he is continuing to perform the home exercise program provided to him by physical therapy  He has a follow-up with neuro surgery for the subdural hematoma on November 8th, he will have a CT prior to visit  CBC and CMP are within normal limits, total cholesterol 183, triglycerides 50, HDL 69,      Patient Care Team:  Marito Brown DO as PCP - General (Family Medicine)     Review of Systems:     Review of Systems   Constitutional: Negative  Negative for chills, fatigue and fever  HENT: Positive for hearing loss  Eyes: Negative  Negative for visual disturbance     Respiratory: Negative for cough, chest tightness, shortness of breath and wheezing  Cardiovascular: Negative for chest pain and palpitations  Gastrointestinal: Negative for abdominal pain, blood in stool, constipation, diarrhea, nausea and vomiting  Endocrine: Negative  Genitourinary: Negative for difficulty urinating, dysuria, frequency, hematuria and testicular pain  Musculoskeletal: Negative for arthralgias and myalgias  Skin: Negative  Allergic/Immunologic: Negative  Neurological: Negative for dizziness, seizures, syncope, weakness, light-headedness and headaches  Hematological: Negative for adenopathy  Psychiatric/Behavioral: Negative  Negative for dysphoric mood  The patient is not nervous/anxious  Problem List:     Patient Active Problem List   Diagnosis    Hypercholesterolemia    Borderline hypertension    Medicare annual wellness visit, subsequent    Negative depression screening    Overweight (BMI 25 0-29  9)    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[facility] [address] )    Right hand pain    Carpal tunnel syndrome on right    Hygroma    Primary osteoarthritis of left knee    SDH (subdural hematoma) (HCC)    Hearing loss    Constipation    Moderate protein-calorie malnutrition (HCC)    Hypotension    Diastolic dysfunction    EKG abnormalities    Abnormal echocardiogram    Right bundle-branch block    Balance disorder    Sensorineural hearing loss (SNHL), bilateral      Past Medical and Surgical History:     Past Medical History:   Diagnosis Date    Arthritis     Encounter for general adult medical examination without abnormal findings 3/20/2019    Hyperlipidemia     Urinary retention 6/2/2022     Past Surgical History:   Procedure Laterality Date    BRAIN HEMATOMA EVACUATION Left 5/28/2022    Procedure: left CRANIOTOMY FOR SUBDURAL HEMATOMA;  Surgeon: Mikel Cummings MD;  [facility]: [facility];  Service: Neurosurgery    HERNIA REPAIR Right     inguinal    IR CEREBRAL ANGIOGRAPHY / INTERVENTION  6/2/2022    CO REVISE MEDIAN N/CARPAL TUNNEL SURG Right 9/20/2021    Procedure: RELEASE CARPAL TUNNEL;  Surgeon: Joshua Claire MD;  Location: [facility];  Service: Orthopedics      Family History:     History reviewed  No pertinent family history  Social History:     Social History     Socioeconomic History    Marital status: /Civil Union     Spouse name: None    Number of children: None    Years of education: None    Highest education level: None   Occupational History    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[employer]   Tobacco Use    Smoking status: Former Smoker    Smokeless tobacco: Never Used    Tobacco comment: Smoked as a teenager   Vaping Use    Vaping Use: Never used   Substance and Sexual Activity    Alcohol use: Yes     Comment: Rare    Drug use: No    Sexual activity: None   Other Topics Concern    None   Social History Narrative    None     Social Determinants of Health     Financial Resource Strain: Low Risk     Difficulty of Paying Living Expenses: Not hard at all   Food Insecurity: Not on file   Transportation Needs: No Transportation Needs    Lack of Transportation (Medical): No    Lack of Transportation (Non-Medical): No   Physical Activity: Not on file   Stress: Not on file   Social Connections: Not on file   Intimate Partner Violence: Not on file   Housing Stability: Not on file      Medications and Allergies:     Current Outpatient Medications   Medication Sig Dispense Refill    acetaminophen (TYLENOL) 325 mg tablet Take 2 tablets (650 mg total) by mouth every 6 (six) hours as needed for mild pain  0    Multiple Vitamin (MULTIVITAMIN) tablet Take by mouth daily       Multiple Vitamins-Minerals (PRESERVISION AREDS 2 PO) Take by mouth      simvastatin (ZOCOR) 20 mg tablet Take 1 tablet (20 mg total) by mouth daily at bedtime 90 tablet 2     No current facility-administered medications for this visit       No Known Allergies   Immunizations:     Immunization History   Administered Date(s) Administered   [facility] COVID-19 MODERNA VACC 0 25 ML IM BOOSTER 11/01/2021    COVID-19 MODERNA VACC 0 5 ML IM 01/19/2021, 02/17/2021    INFLUENZA 10/11/2017, 09/05/2019    Influenza, high dose seasonal 0 7 mL 09/20/2018, 10/12/2020, 12/06/2021    Pneumococcal 11/12/2012, 01/06/2017    Pneumococcal Conjugate 13-Valent 06/12/2020    Pneumococcal Polysaccharide PPV23 07/08/2021    Tdap 04/08/2021      Health Maintenance: There are no preventive care reminders to display for this patient  Topic Date Due    COVID-19 Vaccine (4 - Booster for Moderna series) 03/01/2022    Influenza Vaccine (1) 09/01/2022      Medicare Screening Tests and Risk Assessments:     Abraham Thompson is here for his Subsequent Wellness visit  Health Risk Assessment:   Patient rates overall health as fair  Patient feels that their physical health rating is slightly worse  Patient is satisfied with their life  Eyesight was rated as same  Hearing was rated as much worse  Patient feels that their emotional and mental health rating is same  Patients states they are never, rarely angry  Patient states they are sometimes unusually tired/fatigued  Pain experienced in the last 7 days has been none  Patient states that he has experienced no weight loss or gain in last 6 months  Depression Screening:   PHQ-2 Score: 0      Fall Risk Screening: In the past year, patient has experienced: history of falling in past year    Number of falls: 2 or more  Injured during fall?: Yes    Feels unsteady when standing or walking?: Yes    Worried about falling?: Yes      Home Safety:  Patient has trouble with stairs inside or outside of their home  Patient has working smoke alarms and has working carbon monoxide detector  Home safety hazards include: none  Nutrition:   Current diet is Regular  Medications:   Patient is currently taking over-the-counter supplements  OTC medications include: see medication list  Patient is able to manage medications       Activities of Daily Living (ADLs)/Instrumental Activities of Daily Living (IADLs):   Walk and transfer into and out of bed and chair?: Yes  Dress and groom yourself?: Yes    Bathe or shower yourself?: Yes    Feed yourself? Yes  Do your laundry/housekeeping?: Yes  Manage your money, pay your bills and track your expenses?: Yes  Make your own meals?: Yes    Do your own shopping?: Yes    Previous Hospitalizations:   Any hospitalizations or ED visits within the last 12 months?: Yes    How many hospitalizations have you had in the last year?: 1-2    Advance Care Planning:   Living will: Yes    Durable POA for healthcare: Yes    Advanced directive: Yes    Five wishes given: No    End of Life Decisions reviewed with patient: Yes    Provider agrees with end of life decisions: Yes      Cognitive Screening:   Provider or family/friend/caregiver concerned regarding cognition?: No    PREVENTIVE SCREENINGS      Cardiovascular Screening:    General: Screening Not Indicated and History Lipid Disorder      Diabetes Screening:     General: Screening Current      Prostate Cancer Screening:    General: Screening Not Indicated      Abdominal Aortic Aneurysm (AAA) Screening:    Risk factors include: tobacco use        Lung Cancer Screening:     General: Screening Not Indicated    Screening, Brief Intervention, and Referral to Treatment (SBIRT)    Screening  Typical number of drinks in a day: 0  Typical number of drinks in a week: 1  Interpretation: Low risk drinking behavior  Single Item Drug Screening:  How often have you used an illegal drug (including marijuana) or a prescription medication for non-medical reasons in the past year? never    Single Item Drug Screen Score: 0  Interpretation: Negative screen for possible drug use disorder    Other Counseling Topics:   Regular weightbearing exercise and calcium and vitamin D intake       No exam data present     Physical Exam:     /68 (BP Location: Left arm, Patient Position: Sitting, Cuff Size: Standard)   Pulse 80   Temp 97 7 °F (36 5 °C) (Tympanic)   Ht 5' 11" (1 803 m)   Wt 77 7 kg (171 lb 6 oz)   SpO2 97%   BMI 23 90 kg/m²     Physical Exam  Vitals and nursing note reviewed  Constitutional:       General: He is not in acute distress  Appearance: Normal appearance  He is well-developed  He is not ill-appearing, toxic-appearing or diaphoretic  HENT:      Head: Normocephalic and atraumatic  Right Ear: Tympanic membrane, ear canal and external ear normal       Left Ear: Tympanic membrane, ear canal and external ear normal       Mouth/Throat:      Mouth: Mucous membranes are moist       Pharynx: Oropharynx is clear  Eyes:      Conjunctiva/sclera: Conjunctivae normal    Neck:      Vascular: No carotid bruit  Cardiovascular:      Rate and Rhythm: Normal rate and regular rhythm  Pulses: Normal pulses  Heart sounds: Normal heart sounds  No murmur heard  Pulmonary:      Effort: Pulmonary effort is normal  No respiratory distress  Breath sounds: Normal breath sounds  Abdominal:      General: Abdomen is flat  There is no distension  Palpations: Abdomen is soft  There is no mass  Tenderness: There is no abdominal tenderness  Musculoskeletal:      Cervical back: Neck supple  No muscular tenderness  Right lower leg: No edema  Left lower leg: No edema  Lymphadenopathy:      Cervical: No cervical adenopathy  Skin:     General: Skin is warm and dry  Neurological:      General: No focal deficit present  Mental Status: He is alert and oriented to person, place, and time  Cranial Nerves: No cranial nerve deficit  Sensory: No sensory deficit  Motor: No weakness  Coordination: Coordination normal       Gait: Gait normal    Psychiatric:         Mood and Affect: Mood normal          Behavior: Behavior normal          Thought Content:  Thought content normal          Judgment: Judgment normal           Marques Boudreaux DO

## 2022-09-07 ENCOUNTER — OFFICE VISIT (OUTPATIENT)
Dept: PODIATRY | Facility: CLINIC | Age: 82
End: 2022-09-07
Payer: MEDICARE

## 2022-09-07 VITALS — HEIGHT: 71 IN | WEIGHT: 171 LBS | BODY MASS INDEX: 23.94 KG/M2

## 2022-09-07 DIAGNOSIS — B35.1 ONYCHOMYCOSIS: Primary | ICD-10-CM

## 2022-09-07 DIAGNOSIS — I73.9 PERIPHERAL VASCULAR DISEASE, UNSPECIFIED (HCC): ICD-10-CM

## 2022-09-07 PROCEDURE — 11721 DEBRIDE NAIL 6 OR MORE: CPT | Performed by: PODIATRIST

## 2022-09-07 NOTE — PROGRESS NOTES
[facility] CHAKA  1940  AT RISK FOOT CARE    1  Onychomycosis     2  Peripheral vascular disease, unspecified ([facility] [address] )         Patient presents for at-risk foot care  Patient has no acute concerns today  Patient has significant lower extremity risk due to diminished pulses in the feet and trophic skin changes to the lower extremity including thick toenail, atrophic skin, and decreased hair growth  On exam patient has thickened, hypertrophic, discolored, brittle toenails with subungual debris and tenderness x10   Callus: 0  Patient has diminished pedal pulses and decreased perfusion to the lower extremities  Patient has significant trophic changes to the skin including thick toenails, decreased pedal hair and atrophic skin  Today's treatment includes:  Debridement of toenails  Using nail nipper, álvaro, and curette, nails were sharply debrided, reduced in thickness and length  Devitalized nail tissue and fungal debris excised and removed  Patient tolerated well  Discussed proper shoe gear, daily inspections of feet, and general foot health with patient  Patient has Q8  findings and is recommended for at risk foot care every 9-10 weeks      Patients most recent complete clinical foot exam was on: 5/3

## 2022-09-20 ENCOUNTER — OFFICE VISIT (OUTPATIENT)
Dept: FAMILY MEDICINE CLINIC | Facility: CLINIC | Age: 82
End: 2022-09-20
Payer: MEDICARE

## 2022-09-20 VITALS
SYSTOLIC BLOOD PRESSURE: 116 MMHG | WEIGHT: 170 LBS | HEIGHT: 71 IN | HEART RATE: 82 BPM | BODY MASS INDEX: 23.8 KG/M2 | TEMPERATURE: 98 F | DIASTOLIC BLOOD PRESSURE: 62 MMHG | OXYGEN SATURATION: 98 %

## 2022-09-20 DIAGNOSIS — U07.1 COVID-19: Primary | ICD-10-CM

## 2022-09-20 LAB
SARS-COV-2 AG UPPER RESP QL IA: POSITIVE
VALID CONTROL: ABNORMAL

## 2022-09-20 PROCEDURE — 99213 OFFICE O/P EST LOW 20 MIN: CPT | Performed by: FAMILY MEDICINE

## 2022-09-20 PROCEDURE — 87811 SARS-COV-2 COVID19 W/OPTIC: CPT | Performed by: FAMILY MEDICINE

## 2022-09-20 RX ORDER — NIRMATRELVIR AND RITONAVIR 300-100 MG
3 KIT ORAL 2 TIMES DAILY
Qty: 30 TABLET | Refills: 0 | Status: SHIPPED | OUTPATIENT
Start: 2022-09-20 | End: 2022-09-25

## 2022-09-20 NOTE — PROGRESS NOTES
Assessment/Plan:    No problem-specific Assessment & Plan notes found for this encounter  Diagnoses and all orders for this visit:    COVID-19  -     nirmatrelvir & ritonavir (Paxlovid, 300/100,) tablet therapy pack; Take 3 tablets by mouth 2 (two) times a day for 5 days Take 2 nirmatrelvir tablets + 1 ritonavir tablet together per dose          PHQ-2/9 Depression Screening    Little interest or pleasure in doing things: 0 - not at all  Feeling down, depressed, or hopeless: 0 - not at all  PHQ-2 Score: 0  PHQ-2 Interpretation: Negative depression screen            Subjective:      Patient ID: Roxie Ha is a 79 y o  male  Presents with 4 days of cough, congestion, headache  Rapid covid in the office +  He denies chest pain, shortness of breath      The following portions of the patient's history were reviewed and updated as appropriate: allergies, current medications, past family history, past medical history, past social history, past surgical history and problem list     Review of Systems   Constitutional: Positive for activity change and fatigue  Negative for chills and fever  HENT: Positive for congestion, postnasal drip and rhinorrhea  Negative for sore throat  Respiratory: Positive for cough  Negative for chest tightness and shortness of breath  Cardiovascular: Negative for chest pain, palpitations and leg swelling  Gastrointestinal: Negative  Neurological: Positive for headaches  Negative for dizziness and syncope  Objective:    /62 (BP Location: Left arm, Patient Position: Sitting)   Pulse 82   Temp 98 °F (36 7 °C) (Tympanic)   Ht 5' 11" (1 803 m)   Wt 77 1 kg (170 lb)   SpO2 98%   BMI 23 71 kg/m²      Physical Exam  Vitals and nursing note reviewed  Constitutional:       General: He is not in acute distress  Appearance: Normal appearance  He is not ill-appearing     HENT:      Right Ear: Tympanic membrane, ear canal and external ear normal       Left Ear: Tympanic membrane, ear canal and external ear normal       Nose: Congestion and rhinorrhea present  Eyes:      Conjunctiva/sclera: Conjunctivae normal    Cardiovascular:      Rate and Rhythm: Normal rate and regular rhythm  Heart sounds: Normal heart sounds  No murmur heard  Pulmonary:      Effort: Pulmonary effort is normal  No respiratory distress  Breath sounds: Normal breath sounds  No wheezing, rhonchi or rales  Abdominal:      General: Abdomen is flat  Musculoskeletal:      Cervical back: Neck supple  Lymphadenopathy:      Cervical: No cervical adenopathy  Neurological:      General: No focal deficit present  Mental Status: He is alert and oriented to person, place, and time  Psychiatric:         Mood and Affect: Mood normal          Behavior: Behavior normal          Thought Content:  Thought content normal          Judgment: Judgment normal

## 2022-10-12 PROBLEM — Z00.00 MEDICARE ANNUAL WELLNESS VISIT, SUBSEQUENT: Status: RESOLVED | Noted: 2019-03-20 | Resolved: 2022-10-12

## 2022-10-21 ENCOUNTER — HOSPITAL ENCOUNTER (OUTPATIENT)
Dept: CT IMAGING | Facility: HOSPITAL | Age: 82
End: 2022-10-21
Attending: NEUROLOGICAL SURGERY
Payer: MEDICARE

## 2022-10-21 ENCOUNTER — HOSPITAL ENCOUNTER (OUTPATIENT)
Dept: CT IMAGING | Facility: HOSPITAL | Age: 82
End: 2022-10-21
Payer: MEDICARE

## 2022-10-21 DIAGNOSIS — H90.3 SENSORINEURAL HEARING LOSS (SNHL), BILATERAL: ICD-10-CM

## 2022-10-21 DIAGNOSIS — S06.5XAA SDH (SUBDURAL HEMATOMA): ICD-10-CM

## 2022-10-21 DIAGNOSIS — H72.91 PERFORATION OF EAR DRUM, RIGHT: ICD-10-CM

## 2022-10-21 PROCEDURE — 70450 CT HEAD/BRAIN W/O DYE: CPT

## 2022-10-21 PROCEDURE — 70480 CT ORBIT/EAR/FOSSA W/O DYE: CPT

## 2022-10-21 PROCEDURE — G1004 CDSM NDSC: HCPCS

## 2022-10-26 ENCOUNTER — OFFICE VISIT (OUTPATIENT)
Dept: FAMILY MEDICINE CLINIC | Facility: CLINIC | Age: 82
End: 2022-10-26

## 2022-10-26 VITALS
DIASTOLIC BLOOD PRESSURE: 74 MMHG | WEIGHT: 172.2 LBS | BODY MASS INDEX: 24.11 KG/M2 | HEIGHT: 71 IN | TEMPERATURE: 97.7 F | SYSTOLIC BLOOD PRESSURE: 128 MMHG | OXYGEN SATURATION: 96 % | HEART RATE: 75 BPM

## 2022-10-26 DIAGNOSIS — F01.50 ISCHEMIC VASCULAR DEMENTIA (HCC): ICD-10-CM

## 2022-10-26 DIAGNOSIS — E78.00 HYPERCHOLESTEROLEMIA: Chronic | ICD-10-CM

## 2022-10-26 DIAGNOSIS — U07.1 COVID-19: Primary | ICD-10-CM

## 2022-10-26 DIAGNOSIS — R03.0 BORDERLINE HYPERTENSION: ICD-10-CM

## 2022-10-26 DIAGNOSIS — Z23 ENCOUNTER FOR IMMUNIZATION: ICD-10-CM

## 2022-10-26 DIAGNOSIS — H90.3 SENSORINEURAL HEARING LOSS (SNHL), BILATERAL: ICD-10-CM

## 2022-10-26 NOTE — PROGRESS NOTES
Assessment/Plan:    No problem-specific Assessment & Plan notes found for this encounter  Diagnoses and all orders for this visit:    COVID-19  Comments:  Resolved and doing well    Sensorineural hearing loss (SNHL), bilateral  Comments:  considering cochlear implant which I also encouraged  Borderline hypertension  Comments:  Continues to be quite well controlled off of medication  Orders:  -     CBC and differential; Future  -     Comprehensive metabolic panel; Future    Hypercholesterolemia  Comments:  re-check lipids next visit  Orders:  -     CBC and differential; Future  -     Comprehensive metabolic panel; Future  -     Lipid panel; Future    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[facility] [address] )    Encounter for immunization  -     influenza vaccine, high-dose, PF 0 7 mL (FLUZONE HIGH-DOSE)          PHQ-2/9 Depression Screening    Little interest or pleasure in doing things: 0 - not at all  Feeling down, depressed, or hopeless: 0 - not at all  PHQ-2 Score: 0  PHQ-2 Interpretation: Negative depression screen            Subjective:      Patient ID: Nita Marrero is a 79 y o  male  HPI    The following portions of the patient's history were reviewed and updated as appropriate: allergies, current medications, past family history, past medical history, past social history, past surgical history and problem list     Review of Systems   Constitutional: Negative for activity change, appetite change, chills, fatigue and fever  HENT: Positive for hearing loss  Eyes: Negative  Respiratory: Negative for cough, chest tightness, shortness of breath and wheezing  Cardiovascular: Negative for chest pain and palpitations  Gastrointestinal: Negative  Genitourinary: Negative  Musculoskeletal: Negative  Neurological: Negative for dizziness, syncope, light-headedness and headaches           Objective:    /74 (BP Location: Left arm, Patient Position: Sitting)   Pulse 75   Temp 97 7 °F (36 5 °C) (Tympanic)  5' 11" (1 803 m)   Wt 78 1 kg (172 lb 3 2 oz)   SpO2 96%   BMI 24 02 kg/m²      Physical Exam  Vitals and nursing note reviewed  Constitutional:       General: He is not in acute distress  Appearance: Normal appearance  He is not ill-appearing  HENT:      Right Ear: Tympanic membrane, ear canal and external ear normal       Left Ear: Tympanic membrane, ear canal and external ear normal       Nose: Nose normal  No congestion or rhinorrhea  Mouth/Throat:      Mouth: Mucous membranes are moist    Eyes:      Conjunctiva/sclera: Conjunctivae normal       Pupils: Pupils are equal, round, and reactive to light  Cardiovascular:      Rate and Rhythm: Normal rate and regular rhythm  Heart sounds: Normal heart sounds  No murmur heard  Pulmonary:      Effort: Pulmonary effort is normal  No respiratory distress  Breath sounds: Normal breath sounds  No stridor  No wheezing, rhonchi or rales  Abdominal:      General: Abdomen is flat  Palpations: Abdomen is soft  Musculoskeletal:      Cervical back: Neck supple  Lymphadenopathy:      Cervical: No cervical adenopathy  Skin:     General: Skin is warm and dry  Neurological:      General: No focal deficit present  Mental Status: He is alert and oriented to person, place, and time  Psychiatric:         Mood and Affect: Mood normal          Behavior: Behavior normal          Thought Content:  Thought content normal          Judgment: Judgment normal

## 2022-10-31 ENCOUNTER — TELEPHONE (OUTPATIENT)
Dept: OTOLARYNGOLOGY | Facility: CLINIC | Age: 82
End: 2022-10-31

## 2022-10-31 NOTE — TELEPHONE ENCOUNTER
Discussed pt results via phone with wife  Reassured no abnormal findings on Ct temporal bones    Proceed with CI evaluation as scheduled

## 2022-11-03 ENCOUNTER — HOSPITAL ENCOUNTER (EMERGENCY)
Facility: HOSPITAL | Age: 82
Discharge: HOME | End: 2022-11-03
Attending: EMERGENCY MEDICINE

## 2022-11-03 ENCOUNTER — APPOINTMENT (EMERGENCY)
Dept: CT IMAGING | Facility: HOSPITAL | Age: 82
End: 2022-11-03

## 2022-11-03 VITALS
WEIGHT: 172 LBS | HEART RATE: 90 BPM | RESPIRATION RATE: 18 BRPM | OXYGEN SATURATION: 96 % | SYSTOLIC BLOOD PRESSURE: 157 MMHG | TEMPERATURE: 98 F | BODY MASS INDEX: 23.99 KG/M2 | DIASTOLIC BLOOD PRESSURE: 89 MMHG
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
## 2022-11-03 DIAGNOSIS — S00.81XA FACIAL ABRASION, INITIAL ENCOUNTER: ICD-10-CM

## 2022-11-03 NOTE — ED PROVIDER NOTES
History  Chief Complaint   Patient presents with   • Head Laceration     Pt tripped and fell and hit face on piece of wood  No LOC, no blood thinner, small laceration to rt face     50-year-old male presents to the emergency department seeking evaluation for a trip and fall at home and a right-sided facial injury  It is reported the patient struck his face on a piece of wood  Some dried blood is noted  There appears to be a small skin tear noted to the corner of the right eye  Bleeding is controlled  Patient overall is well-appearing in no acute distress  Prior history of subdural hematoma  No reported headache nausea vomiting fatigue  Patient is accompanied by wife  Allergies reviewed          Prior to Admission Medications   Prescriptions Last Dose Informant Patient Reported? Taking?    Multiple Vitamin (MULTIVITAMIN) tablet  Spouse/Significant Other Yes No   Sig: Take by mouth daily    Multiple Vitamins-Minerals (PRESERVISION AREDS 2 PO)   Yes No   Sig: Take by mouth   acetaminophen (TYLENOL) 325 mg tablet  Spouse/Significant Other No No   Sig: Take 2 tablets (650 mg total) by mouth every 6 (six) hours as needed for mild pain   simvastatin (ZOCOR) 20 mg tablet  Spouse/Significant Other No No   Sig: Take 1 tablet (20 mg total) by mouth daily at bedtime      Facility-Administered Medications: None       Past Medical History:   Diagnosis Date   • Arthritis    • Encounter for general adult medical examination without abnormal findings 3/20/2019   • Hyperlipidemia    • Urinary retention 6/2/2022       Past Surgical History:   Procedure Laterality Date   • BRAIN HEMATOMA EVACUATION Left 5/28/2022    Procedure: left CRANIOTOMY FOR SUBDURAL HEMATOMA;  Surgeon: Janna Reed MD;  [facility]: [facility];  Service: Neurosurgery   • HERNIA REPAIR Right     inguinal   • IR CEREBRAL ANGIOGRAPHY / INTERVENTION  6/2/2022   • DC REVISE MEDIAN N/CARPAL TUNNEL SURG Right 9/20/2021    Procedure: RELEASE CARPAL TUNNEL;  Surgeon: Latoya Hsu MD;  Location: [facility];  Service: Orthopedics       History reviewed  No pertinent family history  I have reviewed and agree with the history as documented  E-Cigarette/Vaping   • E-Cigarette Use Never User      E-Cigarette/Vaping Substances   • Nicotine No    • THC No    • CBD No    • Flavoring No    • Other No    • Unknown No      Social History     Tobacco Use   • Smoking status: Former Smoker   • Smokeless tobacco: Never Used   • Tobacco comment: Smoked as a teenager   Vaping Use   • Vaping Use: Never used   Substance Use Topics   • Alcohol use: Yes     Comment: Rare   • Drug use: No       Review of Systems   HENT: Negative for dental problem, facial swelling, hearing loss and tinnitus  Eyes: Negative for pain and visual disturbance  Respiratory: Negative for chest tightness and shortness of breath  Cardiovascular: Negative for chest pain  Gastrointestinal: Negative for abdominal pain  Musculoskeletal: Negative for back pain and neck pain  Skin: Positive for wound  Neurological: Negative for dizziness and headaches  All other systems reviewed and are negative  Physical Exam  Physical Exam  Vitals and nursing note reviewed  Constitutional:       General: He is not in acute distress  Appearance: He is well-developed  He is not ill-appearing  HENT:      Head: Normocephalic and atraumatic  Right Ear: External ear normal       Left Ear: External ear normal       Nose: Nose normal    Eyes:      Pupils: Pupils are equal, round, and reactive to light  Cardiovascular:      Rate and Rhythm: Normal rate and regular rhythm  Heart sounds: Normal heart sounds  No murmur heard  No friction rub  No gallop  Pulmonary:      Effort: Pulmonary effort is normal  No respiratory distress  Breath sounds: Normal breath sounds  No stridor  No wheezing or rales  Abdominal:      General: Bowel sounds are normal  There is no distension  Palpations: Abdomen is soft  Tenderness: There is no abdominal tenderness  There is no guarding  Musculoskeletal:         General: No tenderness  Normal range of motion  Cervical back: Normal range of motion and neck supple  Skin:     General: Skin is warm  Capillary Refill: Capillary refill takes less than 2 seconds  Neurological:      Mental Status: He is alert and oriented to person, place, and time  Psychiatric:         Behavior: Behavior is cooperative  Vital Signs  ED Triage Vitals [11/03/22 1637]   Temperature Pulse Respirations Blood Pressure SpO2   98 °F (36 7 °C) 90 18 157/89 96 %      Temp src Heart Rate Source Patient Position - Orthostatic VS BP Location FiO2 (%)   -- -- -- -- --      Pain Score       --           Vitals:    11/03/22 1637   BP: 157/89   Pulse: 90         Visual Acuity      ED Medications  Medications - No data to display    Diagnostic Studies  Results Reviewed     None                 CT head without contrast   Final Result by Eloise Jurado MD (11/03 1493)   0 3 cm thick (previously 0 4 cm) left cerebral convex city hypodense subdural collection after prior hematoma evacuation  Continued minimal 0 2 cm rightward shift, stable  Stable microangiopathic changes noted  Workstation performed: [id number]                    Procedures  Procedures         ED Course                                             MDM  Number of Diagnoses or Management Options  Facial abrasion, initial encounter  Fall, initial encounter  Diagnosis management comments: Local wound care was provided for the abrasion to the right eye  Bleeding well controlled  CT head unremarkable  Patient educated regarding their diagnosis and given return and follow-up instructions  Patient was advised to returned to the ED with worsening symptoms or concerns  Patient is understanding of and in agreement with the treatment plan  There are no questions at the time of discharge         Amount and/or Complexity of Data Reviewed  Tests in the radiology section of CPT®: ordered and reviewed    Risk of Complications, Morbidity, and/or Mortality  Presenting problems: low  Diagnostic procedures: low  Management options: low    Patient Progress  Patient progress: stable      Disposition  Final diagnoses:   Fall, initial encounter   Facial abrasion, initial encounter     Time reflects when diagnosis was documented in both MDM as applicable and the Disposition within this note     Time User Action Codes Description Comment    11/3/2022  5:43 PM Lindsey Carrel Add [J54  XCTL] Fall, initial encounter     11/3/2022  5:44 PM Lindsey Carrel Add [S00 [id number]] Facial abrasion, initial encounter       ED Disposition     ED Disposition   Discharge    Condition   Stable    Date/Time   u Nov 3, 2022  5:44 PM    Comment   [facility] discharge to home/self care  Follow-up Information     Follow up With Specialties Details Why [address] [facility], [address] [facility], Nurse Practitioner   [address] [address]  [phone]            Discharge Medication List as of 11/3/2022  5:44 PM      CONTINUE these medications which have NOT CHANGED    Details   acetaminophen (TYLENOL) 325 mg tablet Take 2 tablets (650 mg total) by mouth every 6 (six) hours as needed for mild pain, Starting Mon 6/20/2022, No Print      Multiple Vitamin (MULTIVITAMIN) tablet Take by mouth daily , Historical Med      Multiple Vitamins-Minerals (PRESERVISION AREDS 2 PO) Take by mouth, Historical Med      simvastatin (ZOCOR) 20 mg tablet Take 1 tablet (20 mg total) by mouth daily at bedtime, Starting Tue 5/17/2022, Normal             No discharge procedures on file      PDMP Review       Value Time User    PDMP Reviewed  Yes 6/20/2022  5:06 PM Jenise James MD          ED Provider  Electronically Signed by           Zak Mcleod PA-C  11/03/22 7351

## 2022-11-08 ENCOUNTER — OFFICE VISIT (OUTPATIENT)
Dept: NEUROSURGERY | Facility: CLINIC | Age: 82
End: 2022-11-08

## 2022-11-08 VITALS
WEIGHT: 179 LBS | HEART RATE: 74 BPM | RESPIRATION RATE: 16 BRPM | HEIGHT: 71 IN | TEMPERATURE: 97.3 F | BODY MASS INDEX: 25.06 KG/M2 | OXYGEN SATURATION: 98 % | SYSTOLIC BLOOD PRESSURE: 128 MMHG | DIASTOLIC BLOOD PRESSURE: 82 MMHG

## 2022-11-08 DIAGNOSIS — D18.1 HYGROMA: Primary | ICD-10-CM

## 2022-11-08 NOTE — PROGRESS NOTES
DISCUSSION SUMMARY  This is a 79 y o  male status post a trip and fall with a history of a subdural hematoma  Repeat imaging study demonstrated no reaccumulation  He is neurologically doing well say for his severe hearing deficit and is planned to be evaluated for various different hearing reconstructive aids  Return if symptoms worsen or fail to improve  Diagnosis ICD-10-CM Associated Orders   1  Hygroma  D18 1           Chief Complaint   Patient presents with   • Follow-up     3 Month Fu With CT head 10/21/22 S/P Left Craniotomy For Subdural Hematoma 5/28/2022 (DKO)  S/p fall 11/3/22; went to  and got another CT head 11/3       HPI 71-year-old male who is well-known for subdural hematoma which was drained and he underwent an MMA occlusion  He did fall recently in the garden and struck his head  He had no change in his neurologic status but because of this he underwent a CT scan of his brain which demonstrated no changes  In fact as mentioned below it the CT scan is actually improved  He had no transient neurologic abnormalities  He is doing very well in general he does have some unsteadiness of gait but this did not contribute to his fall  He is extremely hard of hearing in this is unchanged  Review of Systems   Constitutional: Negative  HENT: Negative  Eyes: Negative  Respiratory: Negative  Cardiovascular: Negative  Gastrointestinal: Negative  Endocrine: Negative  Genitourinary: Negative  Musculoskeletal: Positive for gait problem (unsteady)  Skin: Negative  Allergic/Immunologic: Negative  Hematological: Negative  Psychiatric/Behavioral: Negative  All other systems reviewed and are negative          Vitals:    /82 (BP Location: Left arm, Patient Position: Sitting, Cuff Size: Standard)   Pulse 74   Temp (!) 97 3 °F (36 3 °C) (Temporal)   Resp 16   Ht 5' 11" (1 803 m)   Wt 81 2 kg (179 lb)   SpO2 98%   BMI 24 97 kg/m²       MEDICAL HISTORY  Past Medical History:   Diagnosis Date   • Arthritis    • Encounter for general adult medical examination without abnormal findings 03/20/2019   • Fall 11/03/2022   • Hyperlipidemia    • Urinary retention 06/02/2022     Past Surgical History:   Procedure Laterality Date   • BRAIN HEMATOMA EVACUATION Left 5/28/2022    Procedure: left CRANIOTOMY FOR SUBDURAL HEMATOMA;  Surgeon: Timo Bañuelos MD;  Location:  MAIN OR;  Service: Neurosurgery   • HERNIA REPAIR Right     inguinal   • IR CEREBRAL ANGIOGRAPHY / INTERVENTION  6/2/2022   • MA REVISE MEDIAN N/CARPAL TUNNEL SURG Right 9/20/2021    Procedure: RELEASE CARPAL TUNNEL;  Surgeon: Marilyn Cheng MD;  Location: [facility];  Service: Orthopedics     Social History     Tobacco Use   • Smoking status: Former Smoker   • Smokeless tobacco: Never Used   • Tobacco comment: Smoked as a teenager   Vaping Use   • Vaping Use: Never used   Substance Use Topics   • Alcohol use: Yes     Comment: Rare   • Drug use: No        Current Outpatient Medications:   •  Multiple Vitamin (MULTIVITAMIN) tablet, Take by mouth daily , Disp: , Rfl:   •  Multiple Vitamins-Minerals (PRESERVISION AREDS 2 PO), Take by mouth, Disp: , Rfl:   •  simvastatin (ZOCOR) 20 mg tablet, Take 1 tablet (20 mg total) by mouth daily at bedtime, Disp: 90 tablet, Rfl: 2  •  acetaminophen (TYLENOL) 325 mg tablet, Take 2 tablets (650 mg total) by mouth every 6 (six) hours as needed for mild pain (Patient not taking: No sig reported), Disp: , Rfl: 0   No Known Allergies     The following portions of the patient's history were updated by MA and reviewed by MD: allergies, current medications, past family history, past medical history, past social history, past surgical history and problem list       Physical Exam  Vitals and nursing note reviewed  Constitutional:       General: He is not in acute distress  Appearance: Normal appearance  He is normal weight   He is not ill-appearing, toxic-appearing or diaphoretic  HENT:      Head: Normocephalic and atraumatic  Ears:      Comments: Extremely hard of hearing     Nose: Nose normal    Eyes:      Extraocular Movements: Extraocular movements intact  Pupils: Pupils are equal, round, and reactive to light  Musculoskeletal:         General: No swelling, tenderness, deformity or signs of injury  Normal range of motion  Cervical back: Normal range of motion and neck supple  Right lower leg: No edema  Left lower leg: No edema  Skin:     General: Skin is warm and dry  Neurological:      General: No focal deficit present  Mental Status: He is alert and oriented to person, place, and time  Mental status is at baseline  Cranial Nerves: No cranial nerve deficit  Sensory: No sensory deficit  Motor: No weakness  Coordination: Coordination normal       Gait: Gait abnormal ( He does have a wondering baseline but is relatively stable on witnessed gait)  Psychiatric:         Mood and Affect: Mood normal          Behavior: Behavior normal          Thought Content: Thought content normal          Judgment: Judgment normal            RESULTS/DATA  I have personally reviewed pertinent films in PACS   CT scan of the brain is carefully reviewed  This demonstrates nearly complete resolution of the subdural hematoma  This very small residual may be a hygroma at this point  There are no aberrations inside the parenchyma

## 2022-11-11 ENCOUNTER — OFFICE VISIT (OUTPATIENT)
Dept: PODIATRY | Facility: CLINIC | Age: 82
End: 2022-11-11

## 2022-11-11 VITALS — HEIGHT: 71 IN | WEIGHT: 179 LBS | BODY MASS INDEX: 25.06 KG/M2

## 2022-11-11 DIAGNOSIS — M79.675 PAIN IN TOES OF BOTH FEET: ICD-10-CM

## 2022-11-11 DIAGNOSIS — B35.1 ONYCHOMYCOSIS: Primary | ICD-10-CM

## 2022-11-11 DIAGNOSIS — M79.674 PAIN IN TOES OF BOTH FEET: ICD-10-CM

## 2022-11-11 DIAGNOSIS — I73.9 PERIPHERAL VASCULAR DISEASE, UNSPECIFIED (HCC): ICD-10-CM

## 2022-11-11 NOTE — PROGRESS NOTES
[facility] CHAKA  1940  AT RISK FOOT CARE    1  Onychomycosis     2  Peripheral vascular disease, unspecified ([address] )     3  Pain in toes of both feet         Patient presents for at-risk foot care  Patient has no acute concerns today  Patient has significant lower extremity risk due to diminished pulses in the feet and trophic skin changes to the lower extremity including thick toenail, atrophic skin, and decreased hair growth  On exam patient has thickened, hypertrophic, discolored, brittle toenails with subungual debris and tenderness x10   Callus: 0  Patient has diminished pedal pulses and decreased perfusion to the lower extremities  Patient has significant trophic changes to the skin including thick toenails, decreased pedal hair and atrophic skin  Today's treatment includes:  Debridement of toenails  Using nail nipper, álvaro, and curette, nails were sharply debrided, reduced in thickness and length  Devitalized nail tissue and fungal debris excised and removed  Patient tolerated well  Discussed proper shoe gear, daily inspections of feet, and general foot health with patient  Patient has Q8  findings and is recommended for at risk foot care every 9-10 weeks      Patients most recent complete clinical foot exam was on: 5/3

## 2022-11-14 NOTE — PROGRESS NOTES
Assessment/Plan:    No problem-specific Assessment & Plan notes found for this encounter  Diagnoses and all orders for this visit:    Carpal tunnel syndrome on right          PHQ-2/9 Depression Screening              Subjective:      Patient ID: Sheldon Nunez is a 79 y o  male  Patient presents for follow up, S/P CTS surgery R  Wrist  He feels better, some residual discomfort, pain improved  The following portions of the patient's history were reviewed and updated as appropriate: allergies, current medications, past family history, past medical history, past social history, past surgical history and problem list     Review of Systems   Constitutional: Negative  Respiratory: Negative  Cardiovascular: Negative  Musculoskeletal: Negative for arthralgias  Neurological: Negative for numbness  Objective:    /70   Pulse 69   Temp 98 6 °F (37 °C)   Ht 5' 11" (1 803 m)   Wt 84 4 kg (186 lb)   SpO2 97%   BMI 25 94 kg/m²      Physical Exam  Vitals and nursing note reviewed  Constitutional:       General: He is not in acute distress  Appearance: Normal appearance  He is not ill-appearing  Musculoskeletal:         General: No tenderness  Comments: Incision site healing well,    Skin:     General: Skin is warm and dry  Neurological:      Mental Status: He is alert  Psychiatric:         Mood and Affect: Mood normal          Behavior: Behavior normal          Thought Content:  Thought content normal

## 2022-12-21 ENCOUNTER — APPOINTMENT (OUTPATIENT)
Dept: LAB | Facility: HOSPITAL | Age: 82
End: 2022-12-21

## 2022-12-21 ENCOUNTER — OFFICE VISIT (OUTPATIENT)
Dept: LAB | Facility: HOSPITAL | Age: 82
End: 2022-12-21

## 2022-12-21 DIAGNOSIS — R03.0 BORDERLINE HYPERTENSION: ICD-10-CM

## 2022-12-21 DIAGNOSIS — H90.A31 MIXED CONDUCTIVE AND SENSORINEURAL HEARING LOSS OF RIGHT EAR WITH RESTRICTED HEARING OF LEFT EAR: ICD-10-CM

## 2022-12-21 DIAGNOSIS — H90.A22 SENSORINEURAL HEARING LOSS (SNHL) OF LEFT EAR WITH RESTRICTED HEARING OF RIGHT EAR: ICD-10-CM

## 2022-12-21 DIAGNOSIS — E78.00 HYPERCHOLESTEROLEMIA: Chronic | ICD-10-CM

## 2022-12-21 DIAGNOSIS — H72.91 PERFORATION OF EAR DRUM, RIGHT: ICD-10-CM

## 2022-12-21 DIAGNOSIS — Z01.818 PRE-OP TESTING: ICD-10-CM

## 2022-12-21 LAB
ALBUMIN SERPL BCP-MCNC: 4.3 G/DL (ref 3.5–5)
ALP SERPL-CCNC: 64 U/L (ref 34–104)
ALT SERPL W P-5'-P-CCNC: 17 U/L (ref 7–52)
ANION GAP SERPL CALCULATED.3IONS-SCNC: 7 MMOL/L (ref 4–13)
AST SERPL W P-5'-P-CCNC: 16 U/L (ref 13–39)
ATRIAL RATE: 71 BPM
BASOPHILS # BLD AUTO: 0.04 THOUSANDS/ÂΜL (ref 0–0.1)
BASOPHILS NFR BLD AUTO: 1 % (ref 0–1)
BILIRUB SERPL-MCNC: 0.82 MG/DL (ref 0.2–1)
BUN SERPL-MCNC: 12 MG/DL (ref 5–25)
CALCIUM SERPL-MCNC: 9.4 MG/DL (ref 8.4–10.2)
CHLORIDE SERPL-SCNC: 103 MMOL/L (ref 96–108)
CHOLEST SERPL-MCNC: 178 MG/DL
CO2 SERPL-SCNC: 27 MMOL/L (ref 21–32)
CREAT SERPL-MCNC: 0.9 MG/DL (ref 0.6–1.3)
EOSINOPHIL # BLD AUTO: 0.1 THOUSAND/ÂΜL (ref 0–0.61)
EOSINOPHIL NFR BLD AUTO: 2 % (ref 0–6)
ERYTHROCYTE [DISTWIDTH] IN BLOOD BY AUTOMATED COUNT: 13.3 % (ref 11.6–15.1)
GFR SERPL CREATININE-BSD FRML MDRD: 78 ML/MIN/1.73SQ M
GLUCOSE P FAST SERPL-MCNC: 100 MG/DL (ref 65–99)
HCT VFR BLD AUTO: 45 % (ref 36.5–49.3)
HDLC SERPL-MCNC: 56 MG/DL
HGB BLD-MCNC: 14.8 G/DL (ref 12–17)
IMM GRANULOCYTES # BLD AUTO: 0.02 THOUSAND/UL (ref 0–0.2)
IMM GRANULOCYTES NFR BLD AUTO: 0 % (ref 0–2)
LDLC SERPL CALC-MCNC: 112 MG/DL (ref 0–100)
LYMPHOCYTES # BLD AUTO: 1.38 THOUSANDS/ÂΜL (ref 0.6–4.47)
LYMPHOCYTES NFR BLD AUTO: 27 % (ref 14–44)
MCH RBC QN AUTO: 32.2 PG (ref 26.8–34.3)
MCHC RBC AUTO-ENTMCNC: 32.9 G/DL (ref 31.4–37.4)
MCV RBC AUTO: 98 FL (ref 82–98)
MONOCYTES # BLD AUTO: 0.59 THOUSAND/ÂΜL (ref 0.17–1.22)
MONOCYTES NFR BLD AUTO: 12 % (ref 4–12)
NEUTROPHILS # BLD AUTO: 2.92 THOUSANDS/ÂΜL (ref 1.85–7.62)
NEUTS SEG NFR BLD AUTO: 58 % (ref 43–75)
NONHDLC SERPL-MCNC: 122 MG/DL
NRBC BLD AUTO-RTO: 0 /100 WBCS
P AXIS: 5 DEGREES
PLATELET # BLD AUTO: 426 THOUSANDS/UL (ref 149–390)
PMV BLD AUTO: 9.1 FL (ref 8.9–12.7)
POTASSIUM SERPL-SCNC: 4.2 MMOL/L (ref 3.5–5.3)
PR INTERVAL: 176 MS
PROT SERPL-MCNC: 7.3 G/DL (ref 6.4–8.4)
QRS AXIS: 18 DEGREES
QRSD INTERVAL: 134 MS
QT INTERVAL: 428 MS
QTC INTERVAL: 465 MS
RBC # BLD AUTO: 4.59 MILLION/UL (ref 3.88–5.62)
SODIUM SERPL-SCNC: 137 MMOL/L (ref 135–147)
T WAVE AXIS: 31 DEGREES
TRIGL SERPL-MCNC: 50 MG/DL
VENTRICULAR RATE: 71 BPM
WBC # BLD AUTO: 5.05 THOUSAND/UL (ref 4.31–10.16)

## 2023-01-10 NOTE — PRE-PROCEDURE INSTRUCTIONS
Pre-Surgery Instructions:   Medication Instructions   • acetaminophen (TYLENOL) 325 mg tablet Uses PRN- OK to take day of surgery   • Multiple Vitamin (MULTIVITAMIN) tablet Stop taking 7 days prior to surgery  • Multiple Vitamins-Minerals (PRESERVISION AREDS 2 PO) Stop taking 7 days prior to surgery  • simvastatin (ZOCOR) 20 mg tablet Take night before surgery      - Reviewed with patient/wife, in detail, instructions from "My Surgical Experience"  - Instructed to avoid all OTC vitamins/supplements and NSAIDS for one week prior to surgery per anesthesia guidelines  Tylenol ok to take PRN  - Advised patient nothing eat or drink after midnight prior to surgery, except medications that he/she is to take morning DOS with only small sip of water     - Advised patient that Michael Montes will call with surgery arrival time and hospital directions the business day prior to surgery  Patient verbalized understanding of current visitor restrictions/masking guidelines and advised that he/she can confirm these at time of arrival call with Michael Montes      - Patient verbalized understanding and knows to call surgeon's office with any additional questions prior to surgery  Instructed to call surgeon's office in meantime with any new illnesses/exposure, patient verbalized understanding

## 2023-01-11 ENCOUNTER — CONSULT (OUTPATIENT)
Dept: FAMILY MEDICINE CLINIC | Facility: CLINIC | Age: 83
End: 2023-01-11

## 2023-01-11 VITALS
DIASTOLIC BLOOD PRESSURE: 70 MMHG | HEART RATE: 88 BPM | BODY MASS INDEX: 23.24 KG/M2 | TEMPERATURE: 97.3 F | SYSTOLIC BLOOD PRESSURE: 116 MMHG | WEIGHT: 166 LBS | HEIGHT: 71 IN | OXYGEN SATURATION: 97 %

## 2023-01-11 DIAGNOSIS — E78.00 HYPERCHOLESTEROLEMIA: Chronic | ICD-10-CM

## 2023-01-11 DIAGNOSIS — Z01.818 PREOPERATIVE CLEARANCE: ICD-10-CM

## 2023-01-11 DIAGNOSIS — H90.3 SENSORINEURAL HEARING LOSS (SNHL), BILATERAL: Primary | ICD-10-CM

## 2023-01-11 NOTE — PROGRESS NOTES
Name: Yoon Crawley      : 1940      MRN: [id number]  Encounter Provider: Adriana Rivera MD  Encounter Date: 2023   Encounter department: [facility] [address] [address]  Sensorineural hearing loss (SNHL), bilateral  Assessment & Plan:  L ear:   -scheduled for single stage surgery L cochlear implant on 23    R ear:   -tympanic membrane perforation  -h/o R ear infections  -according to ENT, would require multi-stage surgery, not proceeding with this for now      2  Preoperative clearance    3  Hypercholesterolemia         Subjective        Mr Chi Amador is a 80yo M with a PMH of SNHL, HTN, being seen in our office for pre-operative clearance prior to a L cochlear implant scheduled for 23  The surgery will be performed by Dr Marnie Leslie with left cochlear implantation with mastoidectomy, facial nerve monitoring and audiometric testing of the implant  Per Dr Marnie Leslie on 151: 79year old man with longstanding history of right tympanic membrane perforation, occasional right ear infections and bilateral progressive hearing loss which has progressed to deafness  He has been unable to hear on the telephone for the past 5 years  He previously wore hearing aids bilaterally but currently only wears a right hearing aid  He has occasional imbalance  He denies vertigo and prior otologic surgery  He denies CP, SOB, fever, chills, HA, N/V, changes in bowel or urinary habits, and is otherwise in his usual state of health  The patient is a low cardiovascular risk for the proposed procedure according to AAC and  AHA guidelines   Review of Systems   Constitutional: Negative for activity change, appetite change, chills, fatigue, fever and unexpected weight change  HENT: Positive for hearing loss  Negative for congestion, ear discharge, ear pain, rhinorrhea, sinus pressure, sinus pain, sore throat, trouble swallowing and voice change      Eyes: Negative for pain, redness and visual disturbance  Respiratory: Negative for cough, chest tightness, shortness of breath and wheezing  Cardiovascular: Negative for chest pain and palpitations  Gastrointestinal: Negative for abdominal pain, constipation, diarrhea, nausea and vomiting  Genitourinary: Negative for dysuria and hematuria  Musculoskeletal: Positive for gait problem  Negative for arthralgias, back pain, neck pain and neck stiffness  Skin: Negative for color change and rash  Neurological: Negative for seizures, syncope and headaches  Psychiatric/Behavioral: Positive for confusion  Negative for agitation and sleep disturbance  All other systems reviewed and are negative  Current Outpatient Medications on File Prior to Visit   Medication Sig   • acetaminophen (TYLENOL) 325 mg tablet Take 2 tablets (650 mg total) by mouth every 6 (six) hours as needed for mild pain   • Multiple Vitamin (MULTIVITAMIN) tablet Take by mouth daily    • Multiple Vitamins-Minerals (PRESERVISION AREDS 2 PO) Take by mouth   • simvastatin (ZOCOR) 20 mg tablet Take 1 tablet (20 mg total) by mouth daily at bedtime       Objective     /70   Pulse 88   Temp (!) 97 3 °F (36 3 °C) (Tympanic)   Ht 5' 11" (1 803 m)   Wt 75 3 kg (166 lb)   SpO2 97%   BMI 23 15 kg/m²     Physical Exam  Vitals and nursing note reviewed  Constitutional:       General: He is not in acute distress  Appearance: Normal appearance  He is not ill-appearing  HENT:      Head: Normocephalic and atraumatic  Right Ear: Ear canal and external ear normal       Left Ear: Tympanic membrane, ear canal and external ear normal       Ears:      Comments: R side w perf around 6 o'clock position     Nose: Nose normal  No congestion or rhinorrhea  Mouth/Throat:      Mouth: Mucous membranes are moist       Pharynx: Oropharynx is clear  Eyes:      Extraocular Movements: Extraocular movements intact        Conjunctiva/sclera: Conjunctivae normal    Cardiovascular: Rate and Rhythm: Normal rate and regular rhythm  Pulses: Normal pulses  Heart sounds: Normal heart sounds  No murmur heard  No gallop  Pulmonary:      Effort: Pulmonary effort is normal       Breath sounds: Normal breath sounds  No wheezing or rales  Abdominal:      General: Abdomen is flat  Bowel sounds are normal       Palpations: Abdomen is soft  Tenderness: There is no abdominal tenderness  There is no guarding  Musculoskeletal:         General: No deformity or signs of injury  Normal range of motion  Cervical back: Normal range of motion  No rigidity  Skin:     General: Skin is warm and dry  Findings: No erythema or rash  Neurological:      General: No focal deficit present  Mental Status: He is alert  Mental status is at baseline     Psychiatric:         Mood and Affect: Mood normal          Behavior: Behavior normal        Eloise Chin MD

## 2023-01-11 NOTE — ASSESSMENT & PLAN NOTE
L ear:   -scheduled for single stage surgery L cochlear implant on 1/17/23    R ear:   -tympanic membrane perforation  -h/o R ear infections  -according to ENT, would require multi-stage surgery, not proceeding with this for now

## 2023-01-16 ENCOUNTER — ANESTHESIA EVENT (OUTPATIENT)
Dept: PERIOP | Facility: HOSPITAL | Age: 83
End: 2023-01-16

## 2023-01-17 ENCOUNTER — ANESTHESIA (OUTPATIENT)
Dept: PERIOP | Facility: HOSPITAL | Age: 83
End: 2023-01-17

## 2023-01-17 ENCOUNTER — APPOINTMENT (OUTPATIENT)
Dept: RADIOLOGY | Facility: HOSPITAL | Age: 83
End: 2023-01-17

## 2023-01-17 ENCOUNTER — HOSPITAL ENCOUNTER (OUTPATIENT)
Facility: HOSPITAL | Age: 83
Setting detail: OUTPATIENT SURGERY
Discharge: HOME | End: 2023-01-17
Attending: OTOLARYNGOLOGY | Admitting: OTOLARYNGOLOGY

## 2023-01-17 VITALS
DIASTOLIC BLOOD PRESSURE: 82 MMHG | OXYGEN SATURATION: 95 % | SYSTOLIC BLOOD PRESSURE: 155 MMHG | BODY MASS INDEX: 23.8 KG/M2 | HEART RATE: 110 BPM | HEIGHT: 71 IN | RESPIRATION RATE: 18 BRPM | WEIGHT: 170 LBS | TEMPERATURE: 97.5 F

## 2023-01-17 DIAGNOSIS — Z98.890 POSTOPERATIVE STATE: ICD-10-CM

## 2023-01-17 DIAGNOSIS — R11.2 PONV (POSTOPERATIVE NAUSEA AND VOMITING): Primary | ICD-10-CM

## 2023-01-17 DIAGNOSIS — Z98.890 PONV (POSTOPERATIVE NAUSEA AND VOMITING): Primary | ICD-10-CM

## 2023-01-17 DEVICE — COCHLEAR NUCLEUS CI512 COCHLEAR IMPLANT WITH CONTOUR ADVANCE ELECTRODE
Type: IMPLANTABLE DEVICE | Site: EAR | Status: FUNCTIONAL
Brand: NUCLEUS

## 2023-01-17 RX ORDER — ONDANSETRON 2 MG/ML
4 INJECTION INTRAMUSCULAR; INTRAVENOUS EVERY 6 HOURS PRN
Status: DISCONTINUED | OUTPATIENT
Start: 2023-01-17 | End: 2023-01-17 | Stop reason: HOSPADM

## 2023-01-17 RX ORDER — KETOROLAC TROMETHAMINE 30 MG/ML
INJECTION, SOLUTION INTRAMUSCULAR; INTRAVENOUS AS NEEDED
Status: DISCONTINUED | OUTPATIENT
Start: 2023-01-17 | End: 2023-01-17

## 2023-01-17 RX ORDER — OFLOXACIN 3 MG/ML
SOLUTION/ DROPS OPHTHALMIC AS NEEDED
Status: DISCONTINUED | OUTPATIENT
Start: 2023-01-17 | End: 2023-01-17 | Stop reason: HOSPADM

## 2023-01-17 RX ORDER — SODIUM CHLORIDE, SODIUM LACTATE, POTASSIUM CHLORIDE, CALCIUM CHLORIDE 600; 310; 30; 20 MG/100ML; MG/100ML; MG/100ML; MG/100ML
INJECTION, SOLUTION INTRAVENOUS CONTINUOUS PRN
Status: DISCONTINUED | OUTPATIENT
Start: 2023-01-17 | End: 2023-01-17

## 2023-01-17 RX ORDER — FENTANYL CITRATE/PF 50 MCG/ML
25 SYRINGE (ML) INJECTION
Status: DISCONTINUED | OUTPATIENT
Start: 2023-01-17 | End: 2023-01-17 | Stop reason: HOSPADM

## 2023-01-17 RX ORDER — EPHEDRINE SULFATE 50 MG/ML
INJECTION INTRAVENOUS AS NEEDED
Status: DISCONTINUED | OUTPATIENT
Start: 2023-01-17 | End: 2023-01-17

## 2023-01-17 RX ORDER — PROPOFOL 10 MG/ML
INJECTION, EMULSION INTRAVENOUS AS NEEDED
Status: DISCONTINUED | OUTPATIENT
Start: 2023-01-17 | End: 2023-01-17

## 2023-01-17 RX ORDER — SODIUM CHLORIDE, SODIUM LACTATE, POTASSIUM CHLORIDE, CALCIUM CHLORIDE 600; 310; 30; 20 MG/100ML; MG/100ML; MG/100ML; MG/100ML
75 INJECTION, SOLUTION INTRAVENOUS CONTINUOUS
Status: DISCONTINUED | OUTPATIENT
Start: 2023-01-17 | End: 2023-01-17 | Stop reason: HOSPADM

## 2023-01-17 RX ORDER — ONDANSETRON 2 MG/ML
INJECTION INTRAMUSCULAR; INTRAVENOUS AS NEEDED
Status: DISCONTINUED | OUTPATIENT
Start: 2023-01-17 | End: 2023-01-17

## 2023-01-17 RX ORDER — AMOXICILLIN AND CLAVULANATE POTASSIUM 875; 125 MG/1; MG/1
1 TABLET, FILM COATED ORAL EVERY 12 HOURS SCHEDULED
Qty: 10 TABLET | Refills: 0 | Status: SHIPPED | OUTPATIENT
Start: 2023-01-17 | End: 2023-01-22

## 2023-01-17 RX ORDER — IBUPROFEN 200 MG
200 TABLET ORAL EVERY 6 HOURS PRN
Status: DISCONTINUED | OUTPATIENT
Start: 2023-01-17 | End: 2023-01-17 | Stop reason: HOSPADM

## 2023-01-17 RX ORDER — CEFAZOLIN SODIUM 2 G/50ML
2000 SOLUTION INTRAVENOUS ONCE
Status: COMPLETED | OUTPATIENT
Start: 2023-01-17 | End: 2023-01-17

## 2023-01-17 RX ORDER — EPINEPHRINE 0.1 MG/ML
SYRINGE (ML) INJECTION AS NEEDED
Status: DISCONTINUED | OUTPATIENT
Start: 2023-01-17 | End: 2023-01-17 | Stop reason: HOSPADM

## 2023-01-17 RX ORDER — ACETAMINOPHEN 325 MG/1
650 TABLET ORAL EVERY 6 HOURS PRN
Status: DISCONTINUED | OUTPATIENT
Start: 2023-01-17 | End: 2023-01-17 | Stop reason: HOSPADM

## 2023-01-17 RX ORDER — SUCCINYLCHOLINE/SOD CL,ISO/PF 100 MG/5ML
SYRINGE (ML) INTRAVENOUS AS NEEDED
Status: DISCONTINUED | OUTPATIENT
Start: 2023-01-17 | End: 2023-01-17

## 2023-01-17 RX ORDER — LIDOCAINE HYDROCHLORIDE 10 MG/ML
INJECTION, SOLUTION EPIDURAL; INFILTRATION; INTRACAUDAL; PERINEURAL AS NEEDED
Status: DISCONTINUED | OUTPATIENT
Start: 2023-01-17 | End: 2023-01-17

## 2023-01-17 RX ORDER — ONDANSETRON 2 MG/ML
4 INJECTION INTRAMUSCULAR; INTRAVENOUS ONCE AS NEEDED
Status: DISCONTINUED | OUTPATIENT
Start: 2023-01-17 | End: 2023-01-17 | Stop reason: HOSPADM

## 2023-01-17 RX ORDER — DEXAMETHASONE SODIUM PHOSPHATE 10 MG/ML
INJECTION, SOLUTION INTRAMUSCULAR; INTRAVENOUS AS NEEDED
Status: DISCONTINUED | OUTPATIENT
Start: 2023-01-17 | End: 2023-01-17

## 2023-01-17 RX ORDER — PROPOFOL 10 MG/ML
INJECTION, EMULSION INTRAVENOUS CONTINUOUS PRN
Status: DISCONTINUED | OUTPATIENT
Start: 2023-01-17 | End: 2023-01-17

## 2023-01-17 RX ORDER — GLYCOPYRROLATE 0.2 MG/ML
INJECTION INTRAMUSCULAR; INTRAVENOUS AS NEEDED
Status: DISCONTINUED | OUTPATIENT
Start: 2023-01-17 | End: 2023-01-17

## 2023-01-17 RX ORDER — ONDANSETRON 4 MG/1
4 TABLET, FILM COATED ORAL EVERY 8 HOURS PRN
Qty: 20 TABLET | Refills: 0 | Status: SHIPPED | OUTPATIENT
Start: 2023-01-17

## 2023-01-17 RX ORDER — FENTANYL CITRATE 50 UG/ML
INJECTION, SOLUTION INTRAMUSCULAR; INTRAVENOUS AS NEEDED
Status: DISCONTINUED | OUTPATIENT
Start: 2023-01-17 | End: 2023-01-17

## 2023-01-17 RX ADMIN — REMIFENTANIL HYDROCHLORIDE 0.2 MCG/KG/MIN: 1 INJECTION, POWDER, LYOPHILIZED, FOR SOLUTION INTRAVENOUS at 08:15

## 2023-01-17 RX ADMIN — SODIUM CHLORIDE, SODIUM LACTATE, POTASSIUM CHLORIDE, AND CALCIUM CHLORIDE: .6; .31; .03; .02 INJECTION, SOLUTION INTRAVENOUS at 08:01

## 2023-01-17 RX ADMIN — ONDANSETRON 4 MG: 2 INJECTION INTRAMUSCULAR; INTRAVENOUS at 10:23

## 2023-01-17 RX ADMIN — EPHEDRINE SULFATE 10 MG: 50 INJECTION INTRAVENOUS at 08:50

## 2023-01-17 RX ADMIN — GLYCOPYRROLATE 0.2 MCG: 0.2 INJECTION INTRAMUSCULAR; INTRAVENOUS at 08:29

## 2023-01-17 RX ADMIN — LIDOCAINE HYDROCHLORIDE 50 MG: 10 INJECTION, SOLUTION EPIDURAL; INFILTRATION; INTRACAUDAL at 08:08

## 2023-01-17 RX ADMIN — PHENYLEPHRINE HYDROCHLORIDE 100 MCG/MIN: 10 INJECTION INTRAVENOUS at 08:20

## 2023-01-17 RX ADMIN — PROPOFOL 50 MCG/KG/MIN: 10 INJECTION, EMULSION INTRAVENOUS at 08:08

## 2023-01-17 RX ADMIN — EPHEDRINE SULFATE 10 MG: 50 INJECTION INTRAVENOUS at 08:26

## 2023-01-17 RX ADMIN — PROPOFOL 150 MG: 10 INJECTION, EMULSION INTRAVENOUS at 08:08

## 2023-01-17 RX ADMIN — DEXAMETHASONE SODIUM PHOSPHATE 10 MG: 10 INJECTION, SOLUTION INTRAMUSCULAR; INTRAVENOUS at 08:16

## 2023-01-17 RX ADMIN — FENTANYL CITRATE 50 MCG: 50 INJECTION INTRAMUSCULAR; INTRAVENOUS at 08:08

## 2023-01-17 RX ADMIN — CEFAZOLIN SODIUM 2000 MG: 2 SOLUTION INTRAVENOUS at 08:15

## 2023-01-17 RX ADMIN — PROPOFOL 80 MCG/KG/MIN: 10 INJECTION, EMULSION INTRAVENOUS at 08:15

## 2023-01-17 RX ADMIN — PHENYLEPHRINE HYDROCHLORIDE 30 MCG/MIN: 10 INJECTION INTRAVENOUS at 08:15

## 2023-01-17 RX ADMIN — EPHEDRINE SULFATE 10 MG: 50 INJECTION INTRAVENOUS at 10:32

## 2023-01-17 RX ADMIN — KETOROLAC TROMETHAMINE 15 MG: 30 INJECTION, SOLUTION INTRAMUSCULAR; INTRAVENOUS at 10:53

## 2023-01-17 RX ADMIN — Medication 100 MG: at 08:08

## 2023-01-17 RX ADMIN — SODIUM CHLORIDE, SODIUM LACTATE, POTASSIUM CHLORIDE, CALCIUM CHLORIDE: 600; 310; 30; 20 INJECTION, SOLUTION INTRAVENOUS at 08:15

## 2023-01-17 NOTE — OP NOTE
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 ear [H90  A31]     * Mixed conductive and sensorineural hearing loss, unspecified laterality [H90 8]    Procedure(s) (LRB):  LEFT COCHLEAR IMPLANTATION WITH MASTOIDECTOMY AND FACIAL NERVE MONITORING WITH AUDIOMETRIC TESTING OF THE IMPLANT (Left)    Specimen(s):  * No specimens in log *    Estimated Blood Loss:   Minimal    Drains:  * No LDAs found *    Anesthesia Type:   General    Operative Indications:  Deafness in left ear [H91 92]  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 of left ear [H90  A31]  Mixed conductive and sensorineural hearing loss, unspecified laterality [H90 8]    Operative Findings:  Partial incus dislocation otherwise normal mastoid anatomy  Normal cochlear anatomy    Complications:   None    Procedure and Technique:  The patient was brought into the operating room and placed supine on the OR table  Following the induction of general anesthesia, an endotracheal tube was placed by the anesthesia staff  The bed was turned 180 degrees  A 7 cm reverse 7-shaped incision was marked out behind the left ear and injected with 1% lidocaine, 1:100,000 epinephrine  A facial nerve monitor was placed on the left face was found to be functional  The patient was then prepped and draped in sterile fashion      The marked postauricular incision was made through the skin, and the skin flap was elevated anteriorly  An elongated anteriorly based Palva flap was elevated  The position of the external processor, processor well, magnet/internal , and electrode array were measured using the implant sizers  A subperiosteal pocket was elevated posterior to the planned site of the processor well to accomodate the magnet  A small piece of temporalis muscle was harvested, cut into sub-millimeter pieces and saved on the back table for later use  The operating microscope was sterile draped, brought into the field, and used for the remainder of the procedure  A complete mastoidectomy was then performed using the high-speed otologic drill and microdissection techniques  The edges of the mastoidectomy were not saucerized  During this portion of the procedure, the following landmarks were identified and preserved: the posterior bony canal wall, the sigmoid sinus, the tegmen mastoideum, the horizontal semicircular canal, the fossa incudis, the incus  The following findings were identified: the incus short process was slightly displaced posteriorly  A facial recess was performed  Through the facial recess the round window could be visualized  The chorda tympani was bony covered  The tympanic segment of the facial nerve was bony covered  The incus was partially displaced posteriorly from the head of the stapes  It was repositioned and supported with floxin soaked gelfoam     A well was then drilled for the processor and the electrode array, connecting to the mastoidectomy  Around the processor and electrode array 4 holes were drilled for sutures to secure the implant to the bone of the cranium  Through these holes, 4 4-0 Neurolon sutures were placed and secured with hemostats  The area was thoroughly irrigated with sterile saline  The cochleostomy was then performed as follows  Using a 2 mm and 1 5 mm maxine álvaro the round window niche bone was drilled down   The cochlear endosteum was opened with a Kang rasp  The implant, a  Cochlear Nucleus 8  (serial number [id number]) was then opened on the field  The implant processor was placed within the processor well  The implant processor and electrode array were secured using the 4-0 neurolon sutures  The ground electrode was placed beneath the temporalis muscle  The electrode array was then gently placed within the cochleostomy using atraumatic technique  The remaining electrode array was coiled in the mastoid cavity  The Palva flap was sutured back into position with interrupted sutures of 3-0 chromic sutures The implant was then interrogated by the audiologists  Excellent responses on impedences and NRTs were shown on this testing  An intraoperative skull x-ray was performed, showing that the electrode was in good position within the cochlea  The postauricular incision was closed in layers  A Martha dressing was placed     He was then awoken from general anesthesia and taken to the PACU in stable condition  His postoperative facial function was I/VI on the House-Brackmann scale bilaterally  The patient tolerated this procedure well without complications       Dr Patsy Marsh was present throughout this procedure and performed all key portions     I was present for the entire procedure    Patient Disposition:  PACU  and extubated and stable        SIGNATURE: Ana Leach MD  DATE: January 17, 2023  TIME: 10:46 AM

## 2023-01-17 NOTE — DISCHARGE INSTR - AVS FIRST PAGE
Discharge to home    Remove dressing tomorrow morning  Keep ear dry  OK to wash hair in 3 days, keep ear dry  No lifting > 20 lb for 2 weeks  Continue home medications  Tylenol 650 mg PO Q6 hours as needed for pain  Ibuprofen 200 mg PO q4 hour as needed for pain  Zofran 4 mg PO q6 hours as needed for nausea  Augmentin 875 mg twice daily  Diet as tolerated, ok to eat regular foods  Follow up in 1 week with Dr Pablo Benjamin

## 2023-01-17 NOTE — ANESTHESIA PREPROCEDURE EVALUATION
Procedure:  LEFT COCHLEAR IMPLANTATION WITH MASTOIDECTOMY AND FACIAL NERVE MONITORING WITH AUDIOMETRIC TESTING OF THE IMPLANT (Left: Ear)    Relevant Problems   CARDIO   (+) Hypercholesterolemia   (+) Right bundle-branch block      MUSCULOSKELETAL   (+) Primary osteoarthritis of left knee      NEURO/PSYCH   (+) 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HCC)   (+) SDH (subdural hematoma)      6/1/22  Left Ventricle: Left ventricular cavity size is normal  Wall thickness is normal  The left ventricular ejection fraction is 55%  Systolic function is normal  Wall motion is normal  Diastolic function is mildly abnormal, consistent with grade I (abnormal) relaxation  •  IVS: There is abnormal septal motion consistent with bundle branch block  •  Right Ventricle: Right ventricular cavity size is normal  Systolic function is normal   •  Aortic Valve: There is trace regurgitation  •  Mitral Valve: There is mild annular calcification  There is mild subvalvular calcification  There is chordal elongation  There is trace regurgitation  There is systolic anterior motion of the chordal apparatus without late peaking gradient      11/3/22 0 3 cm thick (previously 0 4 cm) left cerebral convex city hypodense subdural collection after prior hematoma evacuation  Continued minimal 0 2 cm rightward shift, stable  Stable microangiopathic changes noted  Physical Exam    Airway    Mallampati score: II  TM Distance: >3 FB  Neck ROM: full     Dental       Cardiovascular      Pulmonary      Other Findings        Anesthesia Plan  ASA Score- 3     Anesthesia Type- general with ASA Monitors  Additional Monitors:   Airway Plan: ETT  Plan Factors-Exercise tolerance (METS): >4 METS  Chart reviewed  EKG reviewed  Imaging results reviewed  Existing labs reviewed  Patient summary reviewed  Patient is not a current smoker  Induction- intravenous      Postoperative Plan-   Planned trial extubation    Informed Consent- Anesthetic plan and risks discussed with patient and spouse (wife signed consent due to hearing loss/arthritis in hands)  I personally reviewed this patient with the CRNA  Discussed and agreed on the Anesthesia Plan with the CRNA  Marci Duran

## 2023-01-17 NOTE — H&P
Sachin Krishnan is a 79 y o  who presents with a chief complaint of       Chief Complaint   Patient presents with   • Hearing Loss         Referred by Dr Araseli Jean and Fred Timmons     Pertinent elements of the history include:  79year old man with longstanding history of right tympanic membrane perforation, occasional right ear infections and bilateral progressive hearing loss which has progressed to deafness  He has been unable to hear on the telephone for the past 5 years  He previously wore hearing aids bilaterally but currently only wears a right hearing aid  He has occasional imbalance  He denies vertigo and prior otologic surgery      Review of systems Review of systems reviewed, as documented on a separate form  All other systems reviewed, are negative  Dr Gilbert Proctor has reviewed these with the patient      The past medical, surgical, social and family history have been reviewed as documented in today's record       has a past medical history of Arthritis, Encounter for general adult medical examination without abnormal findings (03/20/2019), Fall (11/03/2022), Hyperlipidemia, and Urinary retention (06/02/2022)     Surgical History         Past Surgical History:   Procedure Laterality Date   • BRAIN HEMATOMA EVACUATION Left 5/28/2022     Procedure: left CRANIOTOMY FOR SUBDURAL HEMATOMA;  Surgeon: Clemencia Jimenez MD;  Location:  MAIN OR;  Service: Neurosurgery   • HERNIA REPAIR Right       inguinal   • IR CEREBRAL ANGIOGRAPHY / INTERVENTION   6/2/2022   • CO REVISE MEDIAN N/CARPAL TUNNEL SURG Right 9/20/2021     Procedure: RELEASE CARPAL TUNNEL;  Surgeon: Edmund Read MD;  Location: [facility];  Service: Orthopedics            Family History   History reviewed   No pertinent family history         Social History               Socioeconomic History   • Marital status: /Civil Union       Spouse name: Not on file   • Number of children: Not on file   • Years of education: Not on file   • Highest education level: Not on file   Occupational History   • 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[employer]   Tobacco Use   • Smoking status: Former   • Smokeless tobacco: Never   • Tobacco comments:       Smoked as a teenager   Vaping Use   • Vaping Use: Never used   Substance and Sexual Activity   • Alcohol use: Yes       Comment: Rare   • Drug use: No   • Sexual activity: Not on file   Other Topics Concern   • Not on file   Social History Narrative   • Not on file      Social Determinants of Health          Financial Resource Strain: Low Risk    • Difficulty of Paying Living Expenses: Not hard at all   Food Insecurity: Not on file   Transportation Needs: No Transportation Needs   • Lack of Transportation (Medical): No   • Lack of Transportation (Non-Medical):  No   Physical Activity: Not on file   Stress: Not on file   Social Connections: Not on file   Intimate Partner Violence: Not on file   Housing Stability: Not on file                   Current Outpatient Medications on File Prior to Visit   Medication Sig Dispense Refill   • acetaminophen (TYLENOL) 325 mg tablet Take 2 tablets (650 mg total) by mouth every 6 (six) hours as needed for mild pain (Patient not taking: No sig reported)   0   • Multiple Vitamin (MULTIVITAMIN) tablet Take by mouth daily        • Multiple Vitamins-Minerals (PRESERVISION AREDS 2 PO) Take by mouth       • simvastatin (ZOCOR) 20 mg tablet Take 1 tablet (20 mg total) by mouth daily at bedtime 90 tablet 2      No current facility-administered medications on file prior to visit          Physical exam:   Temp (!) 96 6 °F (35 9 °C)   Ht 5' 11" (1 803 m)   Wt 81 2 kg (179 lb)   BMI 24 97 kg/m²      Constitutional:  Well developed, well nourished and groomed, in no acute distress       Eyes:  Extra-ocular movements intact, pupils equally round and reactive to light and accommodation, the lids and conjunctivae are normal in appearance      Head: Atraumatic, normocephalic, no visible scalp lesions, bony palpation unremarkable without stepoffs, parotid and submandibular salivary glands non-tender to palpation and without masses bilaterally      Ears:    Right ear: Auricle normal in appearance, mastoid prominence non-tender and external auditory canal clear, inferior 40-45% dry perforation with extensive tympanosclerosis of the remaining tympanic membrane     Left ear: Auricle normal in appearance, mastoid prominence non-tender, external auditory canal clear and tympanic membrane intact      Tuning forks:     Sawyer 512 Hz cannot hear  Rinne 512 Hz:              right air > bone              left air > bone  CNVII I              Right: I/VI              Left: I/VI     Nose: anterior rhinoscopy shows a patent nasal airway without masses, lesions or polyps     Mouth/oral cavity: no masses and palate elevates symmetrically  TMJs nontender to palpation     Dentition: intact     Oropharynx:  Base of tongue soft and without masses, tonsils bilaterally unremarkable, soft palate mucosa unremarkable       Neck:  No visible or palpable cervical lesions or lymphadenopathy, thyroid gland is normal in size and symmetry and without masses, normal laryngeal elevation with swallowing      Respiratory:  Normal respiratory effort without evidence of retractions or use of accessory muscles      Integument:  Normal appearing without observed masses or lesions      Neurologic:  Cranial nerves II-XII intact bilaterally  No spontaneous or gaze evoked nystagmus  Gait steady      Psychiatric:  Alert and oriented to time, place and person, normal affect      Chest: clear to auscultation    Cardiac: regular rate and rhythm    Abdomen: nondistended soft nontender    Extremities: no CCE     Results reviewed; images from any scan have been personally reviewed:     Audiogram: 8/18/2022  Right ear: severe to profound mixed hearing loss SRT 95 dB WR 0%  Left ear: moderately severe to profound sensorineural hearing loss SRT 75 dB WR 0%  Tracings reviewed by Dr Sonido Oneill, who agrees with the impression as noted above      Tympanogram: 8/18/2022  Right ear: type B large volume  Left ear: type A  Tracings reviewed by Dr Corbin Sanchez, who agrees with the impression as noted above      CI evaluation 11/16/2022  Aided:  Hearing Aids: Oticon Xceed BTE UP  Transducer: Sound Field  Thresholds  dBHL 250 Hz 500 Hz 1k Hz 2k Hz 4k Hz 6k Hz   Right AC    40    35    40    45    NR    NR   Left AC    50    30    45    50    NR    NR         Minimal Auditory Competencies Speech Battery:        Test [employer]   Condition Quiet @ 42 dB HL     (60 dB SPL)   Aided Right Percent Correct =     0%      Test AzBio   Condition Quiet @ 42 dB HL     (60 dB SPL)   Aided Left Percent Correct =     0%      Test AzBio   Condition Quiet @ 42 dB HL     (60 dB SPL)   Aided Binaural Percent Correct =     0%      Audiological Candidacy: Per Medicare guidelines, patient is audiologically a candidate for cochlear implantation      Recommendations:  -appointment scheduled with Js Orlando MD 12- to determine medical/surgical cochlear implant candidacy  -necessity for imaging prior to this appointment to be determined  -patient would need a device discussion appointment if we proceed        IMAGING:     CT temporal bones 8/21/2022  Right ear: normal mastoid, middle ear, TM thickened with inferior perforation, normal ossicular chain, normal inner ear, external auditory canal with cerumen impaction  Left ear: normal mastoid, middle ear, ossicular chain, inner ear, external auditory canal  Dr Corbin Sanchez has reviewed the images and agrees with the impression as noted above         Procedures  None     Assessment/Plan:      1  Sensorineural hearing loss (SNHL) of left ear with restricted hearing of right ear     2  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 of left ear     3  Chronic atticoantral suppurative otitis media of right ear     4  Perforation of ear drum, right     5   Bilateral deafness        His audiogram, tympanogram, cochlear implantation evaluation, CT temporal bones and physical examination findings were discussed in detail with the patient  Management options for his hearing loss, including cochlear implantation versus hearing aids were discussed in detail with the patient  He and his family understand that if he wants a cochlear implant on the right side, he will need to undergo a staged implantation due to his history of recurrent right suppurative otitis media  The first stage would be a right tympanomastoidectomy / subtotal petrosectomy with blind sac closure of the right ear canal and removal of the tympanic membrane and the second would be the implantation surgery  This would need to be staged to avoid infection of the implant  He is also a candidate for left cochlear implantation with mastoidectomy and facial nerve monitoring  He understands that this could be a single stage surgery    The patient understands that the risks of the procedure include but are not limited to hearing loss, dizziness, infection, facial nerve injury/paralysis, and need for further surgery and has agreed to proceed with left cochlear implantation with mastoidectomy, facial nerve monitoring and audiometric testing of the implant  We will continue to observe his right tympanic membrane perforation  Plan for left cochlear with mastoidectomy and facial nerve monitoring and intraoperative audiometric assessment of the implant     Followup: 1 week after surgery

## 2023-01-17 NOTE — ANESTHESIA POSTPROCEDURE EVALUATION
Post-Op Assessment Note    CV Status:  Stable  Pain Score: 0    Pain management: adequate     Mental Status:  Alert and awake   Hydration Status:  Euvolemic   PONV Controlled:  Controlled   Airway Patency:  Patent      Post Op Vitals Reviewed: Yes      Staff: CRNA         No notable events documented      BP   155/86   Temp 97 6   Pulse 117   Resp 18   SpO2 98

## 2023-02-03 ENCOUNTER — OFFICE VISIT (OUTPATIENT)
Dept: PODIATRY | Facility: CLINIC | Age: 83
End: 2023-02-03

## 2023-02-03 VITALS — BODY MASS INDEX: 23.8 KG/M2 | WEIGHT: 170 LBS | HEIGHT: 71 IN

## 2023-02-03 DIAGNOSIS — B35.1 ONYCHOMYCOSIS: Primary | ICD-10-CM

## 2023-02-03 DIAGNOSIS — I73.9 PERIPHERAL VASCULAR DISEASE, UNSPECIFIED (HCC): ICD-10-CM

## 2023-02-03 NOTE — PROGRESS NOTES
[facility] CHAKA  1940  AT RISK FOOT CARE    1  Onychomycosis        2  Peripheral vascular disease, unspecified ([address] )            Patient presents for at-risk foot care  Patient has no acute concerns today  Patient has significant lower extremity risk due to diminished pulses in the feet and trophic skin changes to the lower extremity including thick toenail, atrophic skin, and decreased hair growth  On exam patient has thickened, hypertrophic, discolored, brittle toenails with subungual debris and tenderness x10   Callus: 0  Patient has diminished pedal pulses and decreased perfusion to the lower extremities  Patient has significant trophic changes to the skin including thick toenails, decreased pedal hair and atrophic skin  Today's treatment includes:  Debridement of toenails  Using nail nipper, álvaro, and curette, nails were sharply debrided, reduced in thickness and length  Devitalized nail tissue and fungal debris excised and removed  Patient tolerated well  Discussed proper shoe gear, daily inspections of feet, and general foot health with patient  Patient has Q8  findings and is recommended for at risk foot care every 9-10 weeks      Patients most recent complete clinical foot exam was on: 5/3

## 2023-02-27 ENCOUNTER — OFFICE VISIT (OUTPATIENT)
Dept: FAMILY MEDICINE CLINIC | Facility: CLINIC | Age: 83
End: 2023-02-27

## 2023-02-27 VITALS
WEIGHT: 161.8 LBS | DIASTOLIC BLOOD PRESSURE: 70 MMHG | OXYGEN SATURATION: 96 % | TEMPERATURE: 98.4 F | BODY MASS INDEX: 22.65 KG/M2 | SYSTOLIC BLOOD PRESSURE: 124 MMHG | HEIGHT: 71 IN | HEART RATE: 84 BPM

## 2023-02-27 DIAGNOSIS — E78.00 HYPERCHOLESTEROLEMIA: Primary | Chronic | ICD-10-CM

## 2023-02-27 DIAGNOSIS — Z13.29 SCREENING FOR THYROID DISORDER: ICD-10-CM

## 2023-02-27 DIAGNOSIS — E44.0 MODERATE PROTEIN-CALORIE MALNUTRITION (HCC): ICD-10-CM

## 2023-02-27 DIAGNOSIS — F01.50 ISCHEMIC VASCULAR DEMENTIA (HCC): ICD-10-CM

## 2023-02-27 DIAGNOSIS — S06.5X9A TRAUMATIC SUBDURAL HEMORRHAGE WITH LOSS OF CONSCIOUSNESS OF UNSPECIFIED DURATION, INITIAL ENCOUNTER (HCC): ICD-10-CM

## 2023-02-27 PROBLEM — J96.01 ACUTE RESPIRATORY FAILURE WITH HYPOXIA (HCC): Status: ACTIVE | Noted: 2023-02-27

## 2023-02-27 PROBLEM — J96.01 ACUTE RESPIRATORY FAILURE WITH HYPOXIA (HCC): Status: RESOLVED | Noted: 2023-02-27 | Resolved: 2023-02-27

## 2023-02-27 PROBLEM — H35.30 MACULAR DEGENERATION, AGE RELATED: Status: ACTIVE | Noted: 2023-02-27

## 2023-02-27 NOTE — PROGRESS NOTES
Name: Flory Madsen      : 1940      MRN: [id number]  Encounter Provider: RONY Palomares  Encounter Date: 2023   Encounter department: [address] [place] [address]  Hypercholesterolemia  -     CBC and differential; Future  -     Comprehensive metabolic panel; Future  -     Lipid panel; Future    2  Screening for thyroid disorder  -     TSH, 3rd generation with Free T4 reflex; Future    3  Traumatic subdural hemorrhage with loss of consciousness of unspecified duration, initial encounter ([facility] )    4  Moderate protein-calorie malnutrition ([facility] )    5  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[facility] )         Subjective      HPI   Patient presents for routine follow up, overall doing really well  Patient had a subdural hematoma and was seen by neurosurgery in November  Repeat CT did show improvement in his bleeding  He was cleared by neurosurgery  He has not had any long term effects  No change in his mental status or increase in confusion  He is following with audiology/ENT for cochlear implant which he just had placed  Overall doing well since  BP is well controlled today 124/70  Does not have a large appetite anymore- counseled on nutrition  On Simvastatin 20 mg daily  Cholesterol in December 178, Triglycerides 50, HDL 56 and   Was eating more cookies around the holidays so may be a factor  Counseled on diet  Review of Systems   Constitutional: Negative for activity change, appetite change, chills, fever and unexpected weight change  HENT: Positive for hearing loss  Negative for ear pain and sore throat  Eyes: Negative for pain and visual disturbance  Respiratory: Negative for cough, chest tightness and shortness of breath  Cardiovascular: Negative for chest pain and palpitations  Gastrointestinal: Negative for abdominal pain, constipation, diarrhea, nausea and vomiting  Genitourinary: Negative for dysuria and hematuria     Musculoskeletal: Negative for arthralgias and back pain  Skin: Negative for color change and rash  Neurological: Negative for seizures and syncope  All other systems reviewed and are negative  Current Outpatient Medications on File Prior to Visit   Medication Sig   • acetaminophen (TYLENOL) 325 mg tablet Take 2 tablets (650 mg total) by mouth every 6 (six) hours as needed for mild pain   • Multiple Vitamin (MULTIVITAMIN) tablet Take by mouth daily    • Multiple Vitamins-Minerals (PRESERVISION AREDS 2 PO) Take by mouth   • simvastatin (ZOCOR) 20 mg tablet Take 1 tablet (20 mg total) by mouth daily at bedtime   • [DISCONTINUED] meclizine (ANTIVERT) 25 mg tablet Take 1 tab PO Q 8 hours prn dizziness (Patient not taking: Reported on 2/27/2023)   • [DISCONTINUED] ondansetron (ZOFRAN) 4 mg tablet Take 1 tablet (4 mg total) by mouth every 8 (eight) hours as needed for nausea or vomiting (Patient not taking: Reported on 2/27/2023)       Objective     /70 (BP Location: Left arm, Patient Position: Sitting)   Pulse 84   Temp 98 4 °F (36 9 °C) (Tympanic)   Ht 5' 11" (1 803 m)   Wt 73 4 kg (161 lb 12 8 oz)   SpO2 96%   BMI 22 57 kg/m²     Physical Exam  Vitals and nursing note reviewed  Constitutional:       General: He is not in acute distress  Appearance: Normal appearance  He is normal weight  He is not ill-appearing or toxic-appearing  Cardiovascular:      Rate and Rhythm: Normal rate and regular rhythm  Pulses: Normal pulses  Heart sounds: Normal heart sounds  No murmur heard  No friction rub  No gallop  Pulmonary:      Effort: Pulmonary effort is normal       Breath sounds: Normal breath sounds  No wheezing, rhonchi or rales  Abdominal:      General: Abdomen is flat  Bowel sounds are normal       Palpations: Abdomen is soft  Musculoskeletal:         General: Normal range of motion  Right lower leg: No edema  Left lower leg: No edema  Skin:     General: Skin is warm  Capillary Refill: Capillary refill takes less than 2 seconds  Findings: No bruising or lesion  Neurological:      General: No focal deficit present  Mental Status: He is alert and oriented to person, place, and time  Mental status is at baseline  Motor: No weakness  Gait: Gait normal    Psychiatric:         Mood and Affect: Mood normal          Behavior: Behavior normal          Thought Content:  Thought content normal          Judgment: Judgment normal        RONY Haque

## 2023-02-28 ENCOUNTER — TELEPHONE (OUTPATIENT)
Dept: FAMILY MEDICINE CLINIC | Facility: CLINIC | Age: 83
End: 2023-02-28

## 2023-04-07 ENCOUNTER — OFFICE VISIT (OUTPATIENT)
Dept: PODIATRY | Facility: CLINIC | Age: 83
End: 2023-04-07

## 2023-04-07 VITALS — BODY MASS INDEX: 22.54 KG/M2 | WEIGHT: 161 LBS | HEIGHT: 71 IN

## 2023-04-07 DIAGNOSIS — B35.1 ONYCHOMYCOSIS: Primary | ICD-10-CM

## 2023-04-07 DIAGNOSIS — I73.9 PERIPHERAL VASCULAR DISEASE, UNSPECIFIED (HCC): ICD-10-CM

## 2023-04-07 NOTE — PROGRESS NOTES
[facility] CHAKA  1940  AT RISK FOOT CARE    1  Onychomycosis        2  Peripheral vascular disease, unspecified ([address] )            Patient presents for at-risk foot care  Patient has no acute concerns today  Patient has significant lower extremity risk due to diminished pulses in the feet and trophic skin changes to the lower extremity including thick toenail, atrophic skin, and decreased hair growth  On exam patient has thickened, hypertrophic, discolored, brittle toenails with subungual debris and tenderness x10   Callus: 0  Patient has diminished pedal pulses and decreased perfusion to the lower extremities  Patient has significant trophic changes to the skin including thick toenails, decreased pedal hair and atrophic skin  Today's treatment includes:  Debridement of toenails  Using nail nipper, álvaro, and curette, nails were sharply debrided, reduced in thickness and length  Devitalized nail tissue and fungal debris excised and removed  Patient tolerated well  Discussed proper shoe gear, daily inspections of feet, and general foot health with patient  Patient has Q8  findings and is recommended for at risk foot care every 9-10 weeks      Patients most recent complete clinical foot exam was on: 5/3

## 2023-06-21 ENCOUNTER — RA CDI HCC (OUTPATIENT)
Dept: OTHER | Facility: HOSPITAL | Age: 83
End: 2023-06-21

## 2023-06-27 ENCOUNTER — OFFICE VISIT (OUTPATIENT)
Dept: FAMILY MEDICINE CLINIC | Facility: CLINIC | Age: 83
End: 2023-06-27
Payer: MEDICARE

## 2023-06-27 VITALS
HEART RATE: 74 BPM | HEIGHT: 71 IN | DIASTOLIC BLOOD PRESSURE: 72 MMHG | OXYGEN SATURATION: 98 % | BODY MASS INDEX: 20.33 KG/M2 | WEIGHT: 145.25 LBS | TEMPERATURE: 98.4 F | SYSTOLIC BLOOD PRESSURE: 110 MMHG

## 2023-06-27 DIAGNOSIS — H90.3 SENSORINEURAL HEARING LOSS (SNHL), BILATERAL: Primary | ICD-10-CM

## 2023-06-27 DIAGNOSIS — F01.50 ISCHEMIC VASCULAR DEMENTIA (HCC): ICD-10-CM

## 2023-06-27 DIAGNOSIS — S06.5XAA SDH (SUBDURAL HEMATOMA) (HCC): ICD-10-CM

## 2023-06-27 PROCEDURE — 99214 OFFICE O/P EST MOD 30 MIN: CPT | Performed by: NURSE PRACTITIONER

## 2023-06-27 NOTE — PROGRESS NOTES
"Name: Angel Padilla      : 1940      MRN: [id number]  Encounter Provider: RONY Manjarrez  Encounter Date: 2023   Encounter department: [facility]     1  Sensorineural hearing loss (SNHL), bilateral    2  SDH (subdural hematoma) (HCC)    3  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[address] )           Subjective      Patient presents for routine follow up  Has been \"different\" since tbi  Wife states that he used to like to read and now he does not  Otherwise, acting himself  No periods of confusion or neurological deficits  Asking if aderral could be used to see if it helps  Recommend against this and rational reviewed  May also be related to decreased hearing and change in interests- just had cochlear implant  Recommend brain stimulating acitivites like cross words, puzzles etc   To get this had to have his last 4 teeth removed and was not eating for a long time due to this  Just started to increase appetite and also added protein shake daily  Has last 15 pounds- weight at home 3 times a week and call if weight loss continues  Denies any red flag symptoms  Review of Systems   Constitutional: Positive for unexpected weight change  Negative for activity change, appetite change, diaphoresis, fatigue and fever  HENT: Negative for congestion, mouth sores, rhinorrhea, sinus pain, trouble swallowing and voice change  Eyes: Negative for photophobia and visual disturbance  Respiratory: Negative for apnea, cough, chest tightness, shortness of breath and wheezing  Cardiovascular: Negative for chest pain, palpitations and leg swelling  Gastrointestinal: Negative for abdominal distention, abdominal pain, blood in stool, constipation, diarrhea, nausea and vomiting  Endocrine: Negative for cold intolerance, heat intolerance, polydipsia, polyphagia and polyuria  Genitourinary: Negative for decreased urine volume, difficulty urinating, frequency and urgency   " "  Musculoskeletal: Negative for arthralgias, myalgias, neck pain and neck stiffness  Skin: Negative for color change, rash and wound  Neurological: Negative for dizziness, weakness, light-headedness, numbness and headaches  Hematological: Negative for adenopathy  Does not bruise/bleed easily  Psychiatric/Behavioral: Negative for self-injury, sleep disturbance and suicidal ideas  The patient is not nervous/anxious  Current Outpatient Medications on File Prior to Visit   Medication Sig   • acetaminophen (TYLENOL) 325 mg tablet Take 2 tablets (650 mg total) by mouth every 6 (six) hours as needed for mild pain   • Multiple Vitamin (MULTIVITAMIN) tablet Take by mouth daily    • Multiple Vitamins-Minerals (PRESERVISION AREDS 2 PO) Take by mouth   • simvastatin (ZOCOR) 20 mg tablet Take 1 tablet (20 mg total) by mouth daily at bedtime       Objective     /72 (BP Location: Right arm, Patient Position: Sitting, Cuff Size: Standard)   Pulse 74   Temp 98 4 °F (36 9 °C) (Tympanic)   Ht 5' 11\" (1 803 m)   Wt 65 9 kg (145 lb 4 oz)   SpO2 98%   BMI 20 26 kg/m²     Physical Exam  Vitals and nursing note reviewed  Constitutional:       General: He is not in acute distress  Appearance: Normal appearance  He is not ill-appearing or toxic-appearing  HENT:      Head: Normocephalic and atraumatic  Ears:      Comments: Left cochlear implant      Mouth/Throat:      Mouth: Mucous membranes are moist       Pharynx: Oropharynx is clear  No oropharyngeal exudate or posterior oropharyngeal erythema  Eyes:      Extraocular Movements: Extraocular movements intact  Conjunctiva/sclera: Conjunctivae normal       Pupils: Pupils are equal, round, and reactive to light  Cardiovascular:      Rate and Rhythm: Normal rate and regular rhythm  Pulses: Normal pulses  Heart sounds: Normal heart sounds  No murmur heard  No friction rub  No gallop     Pulmonary:      Effort: Pulmonary effort is " normal  No respiratory distress  Breath sounds: Normal breath sounds  No stridor  No wheezing  Abdominal:      General: Abdomen is flat  Bowel sounds are normal       Palpations: Abdomen is soft  Musculoskeletal:      Cervical back: Normal range of motion and neck supple  No rigidity  No muscular tenderness  Skin:     General: Skin is warm and dry  Capillary Refill: Capillary refill takes less than 2 seconds  Coloration: Skin is not jaundiced or pale  Findings: No bruising  Neurological:      General: No focal deficit present  Mental Status: He is alert and oriented to person, place, and time  Mental status is at baseline  Psychiatric:         Mood and Affect: Mood normal          Behavior: Behavior normal          Thought Content:  Thought content normal          Judgment: Judgment normal        RONY Chavez

## 2023-07-05 ENCOUNTER — OFFICE VISIT (OUTPATIENT)
Dept: PODIATRY | Facility: CLINIC | Age: 83
End: 2023-07-05
Payer: MEDICARE

## 2023-07-05 VITALS — WEIGHT: 150 LBS | BODY MASS INDEX: 21 KG/M2 | HEIGHT: 71 IN

## 2023-07-05 DIAGNOSIS — B35.1 ONYCHOMYCOSIS: ICD-10-CM

## 2023-07-05 DIAGNOSIS — I73.9 PERIPHERAL VASCULAR DISEASE, UNSPECIFIED (HCC): Primary | ICD-10-CM

## 2023-07-05 PROCEDURE — 11721 DEBRIDE NAIL 6 OR MORE: CPT | Performed by: PODIATRIST

## 2023-07-05 NOTE — PROGRESS NOTES
[facility]  1940  AT RISK FOOT CARE    1. Peripheral vascular disease, unspecified ([address])        2. Onychomycosis            Patient presents for at-risk foot care. Patient has no acute concerns today. Patient has significant lower extremity risk due to diminished pulses in the feet and trophic skin changes to the lower extremity including thick toenail, atrophic skin, and decreased hair growth. On exam patient has thickened, hypertrophic, discolored, brittle toenails with subungual debris and tenderness x10   Callus: 0  Patient has diminished pedal pulses and decreased perfusion to the lower extremities  Patient has significant trophic changes to the skin including thick toenails, decreased pedal hair and atrophic skin. Today's treatment includes:  Debridement of toenails. Using nail nipper, álvaro, and curette, nails were sharply debrided, reduced in thickness and length. Devitalized nail tissue and fungal debris excised and removed. Patient tolerated well. Discussed proper shoe gear, daily inspections of feet, and general foot health with patient. Patient has Q8  findings and is recommended for at risk foot care every 9-10 weeks.

## 2023-09-08 ENCOUNTER — OFFICE VISIT (OUTPATIENT)
Dept: PODIATRY | Facility: CLINIC | Age: 83
End: 2023-09-08
Payer: MEDICARE

## 2023-09-08 VITALS
DIASTOLIC BLOOD PRESSURE: 89 MMHG | BODY MASS INDEX: 20.41 KG/M2 | WEIGHT: 145.8 LBS | SYSTOLIC BLOOD PRESSURE: 142 MMHG | HEART RATE: 81 BPM | HEIGHT: 71 IN

## 2023-09-08 DIAGNOSIS — B35.1 ONYCHOMYCOSIS: ICD-10-CM

## 2023-09-08 DIAGNOSIS — I73.9 PERIPHERAL VASCULAR DISEASE, UNSPECIFIED (HCC): Primary | ICD-10-CM

## 2023-09-08 PROCEDURE — 11721 DEBRIDE NAIL 6 OR MORE: CPT | Performed by: PODIATRIST

## 2023-09-08 NOTE — PROGRESS NOTES
[facility]  1940  AT RISK FOOT CARE    1. Peripheral vascular disease, unspecified ([address])        2. Onychomycosis            Patient presents for at-risk foot care. Patient has no acute concerns today. Patient has significant lower extremity risk due to diminished pulses in the feet and trophic skin changes to the lower extremity including thick toenail, atrophic skin, and decreased hair growth. On exam patient has thickened, hypertrophic, discolored, brittle toenails with subungual debris and tenderness x10   Callus: 0  Patient has diminished pedal pulses and decreased perfusion to the lower extremities  Patient has significant trophic changes to the skin including thick toenails, decreased pedal hair and atrophic skin. Today's treatment includes:  Debridement of toenails. Using nail nipper, álvaro, and curette, nails were sharply debrided, reduced in thickness and length. Devitalized nail tissue and fungal debris excised and removed. Patient tolerated well. Discussed proper shoe gear, daily inspections of feet, and general foot health with patient. Patient has Q8  findings and is recommended for at risk foot care every 9-10 weeks.

## 2023-09-28 ENCOUNTER — OFFICE VISIT (OUTPATIENT)
Dept: FAMILY MEDICINE CLINIC | Facility: CLINIC | Age: 83
End: 2023-09-28
Payer: MEDICARE

## 2023-09-28 VITALS
SYSTOLIC BLOOD PRESSURE: 120 MMHG | HEART RATE: 81 BPM | DIASTOLIC BLOOD PRESSURE: 70 MMHG | TEMPERATURE: 96.6 F | OXYGEN SATURATION: 99 % | BODY MASS INDEX: 20.1 KG/M2 | HEIGHT: 71 IN | WEIGHT: 143.6 LBS

## 2023-09-28 DIAGNOSIS — R13.10 DYSPHAGIA, UNSPECIFIED TYPE: ICD-10-CM

## 2023-09-28 DIAGNOSIS — T78.40XA ALLERGY, INITIAL ENCOUNTER: ICD-10-CM

## 2023-09-28 DIAGNOSIS — Z13.29 SCREENING FOR THYROID DISORDER: ICD-10-CM

## 2023-09-28 DIAGNOSIS — J02.9 SORE THROAT: Primary | ICD-10-CM

## 2023-09-28 PROCEDURE — 99214 OFFICE O/P EST MOD 30 MIN: CPT | Performed by: NURSE PRACTITIONER

## 2023-09-28 RX ORDER — LORATADINE 10 MG/1
10 TABLET ORAL DAILY
Qty: 30 TABLET | Refills: 3 | Status: SHIPPED | OUTPATIENT
Start: 2023-09-28

## 2023-09-28 NOTE — PROGRESS NOTES
Name: Yennifer Carrillo      : 1940      MRN: [id number]  Encounter Provider: RONY Taylor  Encounter Date: 2023   Encounter department: [address] [place] [address]     1. Sore throat  -     US thyroid; Future; Expected date: 2023    2. Dysphagia, unspecified type  -     FL barium swallow video w speech; Future; Expected date: 2023    3. Allergy, initial encounter  -     loratadine (CLARITIN) 10 mg tablet; Take 1 tablet (10 mg total) by mouth daily    4. Screening for thyroid disorder  -     TSH, 3rd generation; Future  -     T4, free; Future           Subjective      Presents with his wife for complaints of sore throat intermittently ongoing for several months with eating. Patient does have permanent dental implants in the last 2 weeks but prior to this was eating a very soft diet due to temporary use. His wife has not significantly changed his diet but has added some breading to fish which she normally did not in the past.  However, patient has been complaining when he is eating that he has a sore throat and also feels like food is getting stuck when he is swallowing. Wife does notice that he does cough sometimes when he is eating and drinks lots of water which she thinks is to help flush the food down. Patient's communication is limited. Denies any reflux when she has asked him about reflux symptoms in the past.  No history of smoking. No history of thyroid disease. No other associated symptoms except sometimes does have what sounds to be postnasal drip with congestion that will get stuck in his throat. Try antihistamine daily to see if this helps with symptoms. Thyroid testing and barium swallow study ordered if normal will consider PPI and also follow-up with gastroenterology if needed. Review of Systems   Constitutional: Negative for activity change, appetite change, diaphoresis, fatigue, fever and unexpected weight change.    HENT: Positive for sore throat and trouble swallowing. Negative for congestion, mouth sores, rhinorrhea, sinus pain and voice change. Eyes: Negative for photophobia and visual disturbance. Respiratory: Positive for cough. Negative for apnea, chest tightness, shortness of breath and wheezing. Cardiovascular: Negative for chest pain, palpitations and leg swelling. Gastrointestinal: Negative for abdominal distention, abdominal pain, blood in stool, constipation, diarrhea, nausea and vomiting. Endocrine: Negative for cold intolerance, heat intolerance, polydipsia, polyphagia and polyuria. Genitourinary: Negative for decreased urine volume, difficulty urinating, frequency and urgency. Musculoskeletal: Negative for arthralgias, myalgias, neck pain and neck stiffness. Skin: Negative for color change, rash and wound. Neurological: Negative for dizziness, weakness, light-headedness, numbness and headaches. Hematological: Negative for adenopathy. Does not bruise/bleed easily. Psychiatric/Behavioral: Negative for self-injury, sleep disturbance and suicidal ideas. The patient is not nervous/anxious. Current Outpatient Medications on File Prior to Visit   Medication Sig   • acetaminophen (TYLENOL) 325 mg tablet Take 2 tablets (650 mg total) by mouth every 6 (six) hours as needed for mild pain   • Multiple Vitamin (MULTIVITAMIN) tablet Take by mouth daily    • Multiple Vitamins-Minerals (PRESERVISION AREDS 2 PO) Take by mouth   • simvastatin (ZOCOR) 20 mg tablet Take 1 tablet (20 mg total) by mouth daily at bedtime       Objective     /70 (BP Location: Left arm, Patient Position: Sitting)   Pulse 81   Temp (!) 96.6 °F (35.9 °C) (Tympanic)   Ht 5' 11" (1.803 m)   Wt 65.1 kg (143 lb 9.6 oz)   SpO2 99%   BMI 20.03 kg/m²     Physical Exam  Vitals and nursing note reviewed. Constitutional:       General: He is not in acute distress. Appearance: Normal appearance.  He is not ill-appearing or toxic-appearing. HENT:      Head: Normocephalic and atraumatic. Ears:      Comments: Cochlear implant     Nose: Congestion present. No rhinorrhea. Mouth/Throat:      Mouth: Mucous membranes are moist.      Pharynx: Oropharynx is clear. No oropharyngeal exudate or posterior oropharyngeal erythema. Eyes:      Extraocular Movements: Extraocular movements intact. Conjunctiva/sclera: Conjunctivae normal.      Pupils: Pupils are equal, round, and reactive to light. Cardiovascular:      Rate and Rhythm: Normal rate and regular rhythm. Pulses: Normal pulses. Heart sounds: Normal heart sounds. No murmur heard. No friction rub. No gallop. Pulmonary:      Effort: Pulmonary effort is normal. No respiratory distress. Breath sounds: Normal breath sounds. No stridor. No wheezing. Abdominal:      General: Abdomen is flat. Bowel sounds are normal.      Palpations: Abdomen is soft. Musculoskeletal:      Cervical back: Normal range of motion and neck supple. No rigidity. No muscular tenderness. Skin:     General: Skin is warm and dry. Capillary Refill: Capillary refill takes less than 2 seconds. Coloration: Skin is not jaundiced or pale. Findings: No bruising. Neurological:      General: No focal deficit present. Mental Status: He is alert and oriented to person, place, and time. Mental status is at baseline. Psychiatric:         Mood and Affect: Mood normal.         Behavior: Behavior normal.         Thought Content:  Thought content normal.         Judgment: Judgment normal.       RONY Mancera

## 2023-09-29 ENCOUNTER — HOSPITAL ENCOUNTER (OUTPATIENT)
Dept: ULTRASOUND IMAGING | Facility: HOSPITAL | Age: 83
End: 2023-09-29
Payer: MEDICARE

## 2023-09-29 ENCOUNTER — APPOINTMENT (OUTPATIENT)
Dept: LAB | Facility: CLINIC | Age: 83
End: 2023-09-29
Payer: MEDICARE

## 2023-09-29 DIAGNOSIS — Z13.29 SCREENING FOR THYROID DISORDER: ICD-10-CM

## 2023-09-29 DIAGNOSIS — J02.9 SORE THROAT: ICD-10-CM

## 2023-09-29 PROCEDURE — 76536 US EXAM OF HEAD AND NECK: CPT

## 2023-09-29 PROCEDURE — 84443 ASSAY THYROID STIM HORMONE: CPT

## 2023-09-29 PROCEDURE — 36415 COLL VENOUS BLD VENIPUNCTURE: CPT

## 2023-09-29 PROCEDURE — 84439 ASSAY OF FREE THYROXINE: CPT

## 2023-09-30 LAB
T4 FREE SERPL-MCNC: 0.96 NG/DL (ref 0.61–1.12)
TSH SERPL DL<=0.05 MIU/L-ACNC: 3.32 UIU/ML (ref 0.45–4.5)

## 2023-10-05 ENCOUNTER — HOSPITAL ENCOUNTER (OUTPATIENT)
Dept: RADIOLOGY | Facility: HOSPITAL | Age: 83
End: 2023-10-05
Payer: MEDICARE

## 2023-10-05 DIAGNOSIS — R13.10 DYSPHAGIA, UNSPECIFIED TYPE: ICD-10-CM

## 2023-10-05 PROCEDURE — 92611 MOTION FLUOROSCOPY/SWALLOW: CPT

## 2023-10-05 PROCEDURE — 74230 X-RAY XM SWLNG FUNCJ C+: CPT

## 2023-10-05 NOTE — PROCEDURES
Speech Pathology Videofluoroscopic Swallow Study (VFSS/VBSS/MBSS)    Patient Name: Desmond BUSBY Date: 10/5/2023     Problem List  Active Problems: There are no active Hospital Problems. Past Medical History  Past Medical History:   Diagnosis Date   • Arthritis    • Encounter for general adult medical examination without abnormal findings 03/20/2019   • Fall 11/03/2022   • Hyperlipidemia    • Macular degeneration, wet ([address])    • Urinary retention 06/02/2022     Past Surgical History  Past Surgical History:   Procedure Laterality Date   • BRAIN HEMATOMA EVACUATION Left 05/28/2022    Procedure: left CRANIOTOMY FOR SUBDURAL HEMATOMA;  Surgeon: Andrew Gómez MD;  Location: [facility];  Service: Neurosurgery   • CARPAL TUNNEL RELEASE     • HERNIA REPAIR Right     inguinal   • IR CEREBRAL ANGIOGRAPHY / INTERVENTION  06/02/2022   • WI COCHLEAR DEVICE IMPLANTATION W/WO MASTOIDECTOMY Left 1/17/2023    Procedure: LEFT COCHLEAR IMPLANTATION WITH MASTOIDECTOMY AND FACIAL NERVE MONITORING WITH AUDIOMETRIC TESTING OF THE IMPLANT;  Surgeon: Timothy Garcia MD;  Location: [facility];  Service: ENT   • WI NEUROPLASTY &/TRANSPOS MEDIAN NRV CARPAL [place] Right 09/20/2021    Procedure: RELEASE CARPAL TUNNEL;  Surgeon: Yue Yanez MD;  Location: [facility];  Service: Orthopedics       General Information:  Pt is a 79 y.o. male with a PMH remarkable for brain hematoma evacuation and cochlear implant. Current concerns for dysphagia include coughing with meals, describing discomfort with meals, and difficulty with more dense food items (breads, meats). Patient has a craniotomy in May 2022 and has since lost approximately 30# since that time. A VFSS was recommended to assess oropharyngeal stage swallowing skills at this time. Pt was viewed in lateral position and assessed with thin liquid, puree, soft moist food (turkey sandwich) and a 13mm pill with thin liquid.       Oral stage:  Pt presented with minimal oral stage dysphagia. Mastication was timely and grossly effective with materials administered today. Bolus formation and transfer were functional.  Oral control appeared adequate with no gross premature spillage over the base of tongue. Pharyngeal stage:  Pt presented with moderate pharyngeal dysphagia. Velar elevation noted. Swallowing initiation was prompt. Laryngeal rise and anterior hyoid excursion appeared mildly weak. Airway entrance closure appeared adequate. Tongue base retraction is very weak, minimal BOT drive results in moderate-severe valleculae residue. Pharyngeal constriction suspected to be mildly weak. PES opening was adequate. Strategies and Efficacy:  Small bites, small sips, slow rate, upright posture, multiple swallows per bolus, utilize liquid wash. Aspiration Response and Efficacy: All food, liquid and the barium tablet passed through the pharynx safely and efficiently (ie no laryngeal penetration or aspiration). Esophageal stage:  Brief view of esophagus was completed after administration of PO items, pt does demonstrate mild residue throughout the proximal esophageal segment, some bulging near C4-C5 creating curvature of the cervical spine toward an anterior position. Assessment Summary:  Pt presents with mild-moderate oropharyngeal dysphagia characterized by very weak, uncoordinated, and decreased BOT drive resulting in severe valleculae retention that demonstrates minimal improvements with subsequent swallows and liquid wash. Patient also demonstrates UES weakness/laxity resulting in trace amounts of residue during dense solids (sandwich). Thin liquid wash yielded mild improvement in valleculae retention of solids, no penetration occurs though risk is present. Epiglottis does cover airway completely but minimal tongue drive results in minimal changes to valleculae retention.   Patient would benefit from mechanical soft solids, thin liquids, strategies listed below and initiation of speech/swallow therapy for lingual strengthening. Patient's tongue weakness influences his overall speech intelligibility, swallow safety and would increase his ability to communicate. Note: Images are available for review in PACS as desired. Recommendations:   Recommended Diet:  mechanically altered/level 2 diet and thin liquids   Recommended Form of Medications: crushed with puree   Aspiration precautions and compensatory swallowing strategies: upright posture, only feed when fully alert, slow rate of feeding, small bites/sips, effortful swallow, quiet environment (tv off, limit talking, door closed, etc.), alternating bites and sips and thin liquid wash paired with multiple swallows per bolus. Consider referral to:  [facility] for ongoing care  SLP Dysphagia therapy recommended:  Yes, strengthening BOT drive for decreased vallecular retention as well as overall lingual training for improved intelligibility. Results Reviewed with: patient, MD and family   Pt/Family Education:  Initiated with patient and his spouse, phone call placed to patient's daughter for review of results.

## 2023-10-09 DIAGNOSIS — R13.10 DYSPHAGIA, UNSPECIFIED TYPE: Primary | ICD-10-CM

## 2023-10-11 ENCOUNTER — EVALUATION (OUTPATIENT)
Dept: SPEECH THERAPY | Facility: CLINIC | Age: 83
End: 2023-10-11
Payer: MEDICARE

## 2023-10-11 DIAGNOSIS — F80.9 SPEECH AND LANGUAGE DEFICITS: ICD-10-CM

## 2023-10-11 DIAGNOSIS — H90.3 SENSORINEURAL HEARING LOSS (SNHL), BILATERAL: ICD-10-CM

## 2023-10-11 DIAGNOSIS — R13.12 OROPHARYNGEAL DYSPHAGIA: Primary | ICD-10-CM

## 2023-10-11 DIAGNOSIS — R41.841 COGNITIVE COMMUNICATION DEFICIT: ICD-10-CM

## 2023-10-11 PROCEDURE — 92523 SPEECH SOUND LANG COMPREHEN: CPT

## 2023-10-11 PROCEDURE — 92610 EVALUATE SWALLOWING FUNCTION: CPT

## 2023-10-11 NOTE — PROGRESS NOTES
Speech-Language Pathology Initial Evaluation    Today's date: 10/11/2023   Patient’s name: Adela Muñiz  : 1940  MRN: [id number]  Precautions (AE, allergies, behavorial, etc.): hearing loss, fall risk     Visit tracking:  Referring provider: Epic  Billing guidelines: CMS  Visit # 1  Insurance: [facility]  Re-evaluation projected due date: 2023    Referring provider name: RONY Quesada    Hearing: Cochlear implant  Vision: [facility]  Communication/language preferences: Multimodal, Gestural , and Sign language    Home environment/lifestyle: Lives with spouse IND  Highest level of education: Did not ask  Vocational status: Retired    Subjective: Pleasant, Cooperative, and Requires CG participation and input d/t deficit(s)        History of Present Illness    Patient is a 79 y.o. male with PMHx remarkable for hearing loss s/p cochlear implant placement (2023), L subdural hematoma s/p evacuation (), and vascular dementia (?), referred to outpatient skilled Speech Language Pathology services for a Dysphagia evaluation following recent OP MBSS 10/5/2023. *At time of session, it was determined that pt does not have a functional way of communicating with anyone other than his wife. We discussed completing a communication evaluation to address the severe deficits present. Pertinent medical hx impacting ST POC includes, but is not limited to:  chronic difficulty swallowing vs acute/new onset, age, possible cog deficits, difficulty with communication d/t cochlear implant     Skilled evaluation indicated in order to determine impact and severity of dysphagia and develop responsive POC as indicated; support communicative function via testing and develop appropriate POC/intervention strategies.        Patient/CG subjective: following the mech/SBS diet recommendations; spouse notices coughing with both liquids and with solids; "he can only communicate with me"    Patient/CG goal(s): improve swallow function, improve functional communication. Prior Status    Prior functional status: Occ deficit - swallowing; mod-severe for communicative effectiveness    Previous therapy: Acute inpatient rehab -     Previous therapy assessment results / tx outcomes:     10/5/23: MBSS - "Pt presents with mild-moderate oropharyngeal dysphagia characterized by very weak, uncoordinated, and decreased BOT drive resulting in severe valleculae retention that demonstrates minimal improvements with subsequent swallows and liquid wash. Patient also demonstrates UES weakness/laxity resulting in trace amounts of residue during dense solids (sandwich). Thin liquid wash yielded mild improvement in valleculae retention of solids, no penetration occurs though risk is present. Epiglottis does cover airway completely but minimal tongue drive results in minimal changes to valleculae retention. Patient would benefit from mechanical soft solids, thin liquids, strategies listed below and initiation of speech/swallow therapy for lingual strengthening. Patient's tongue weakness influences his overall speech intelligibility, swallow safety and would increase his ability to communicate. ; Rec: strengthening BOT drive for decreased vallecular retention as well as overall lingual training for improved intelligibility."  Diet Recommendation: Mech/SBS + thins    June 2022: ARC cog-comm status at time of dc: Sup for comprehension, expression, problem solving, and memory, mod Ind with extra time for social interaction. Assessments    All assessments were provided with additional communication modalities (written dictation, support from spouse, simplified language, use of lip reading, etc.). Prior Level of Function   Solids: Regular (IDDSI 7a)  Liquids: Thins (IDDSI 0)   Strategies: none needed    Current Level of Function  Solids: Soft and Bite sized (IDDSI 6) - Mechanical / Chopped   Liquids:  Thins (IDDSI 0)   Strategies: IND use of liquid wash strategy    Cranial Nerve Exam    CN XII Hypoglossal  Lingual protrusion: Mild  Lingual retraction: Mild  Lingual lateralization: Mild  Lingual elevation: Mild  Strength against resistance: Moderate    CN IX Glossopharyngeal & CN V Vagus  Velar elevation: Moderate  Prolonged phonation: Unable (to follow directions)    Cough Strength: DNT  Volitional Throat Clear: DNT    Swallow Assessment   Textures/consistencies administered: SBS solid and thin liquids via open cup and straw sips     Oral Prep Phase Overall Suspected Severity Impaired textures/consistencies Strategies   Task recognition WFL     2. Bolus removal  WFL         Oral Phase Overall Suspected Severity Impaired textures/consistencies Strategies   Initiation WFL     2. Labial seal/closure WFL     3. Mastication Mild SBS solids    4. Bolus formation Mild SBS solids    5. A-P movement (as able to be observed) Mild SBS solids and thins via open cup and straw (appears effortful)    6. Oral clearance Mild SBS        Pharyngeal Phase Overall Suspected Severity Impaired textures/consistencies Strategies   Airway sensitivity/protection Mild Refer to MBSS - no aspiration however risk is present given lingual impairment Benefits from upright positioning cues    2. Patient-reported symptoms  Mild Across all consistencies/textures - currently following mech/SBS and thin liquid diet Pt IND uses liquid wash strategy        Esophageal Phase Overall Suspected Severity Impaired textures/consistencies Strategies   Patient-reported Symptoms DNT     Please note that penetration and aspiration can only be detected during instrumental swallow testing.        Linda 3oz Swallow Test Fail (immediate throat clear and cough following)      PNA Risk Prediction Renae Nieto, 2015)   Immune Health: [facility]  Oral Health Status: WFL  Airway Protection: WFL per MBSS - cannot confirm today in office  Impaired status for all risk predictors = significantly increased risk for developing PNA from an aspiration event. Diet Recommendations  Solids: Soft and Bite sized (IDDSI 6) - Mechanical / Chopped   Liquids: Thins (IDDSI 0)   Instrumental Testing: Initiate tx first    Strategies: Upright positioning, small singles bites/sips        IOPI - MAX LEVELS    Lingual Anterior Max strength: 16  Lingual Posterior Max strength: 12  Norm for age group: 64  Indication: IMPAIRED    Labial: 6  Norm for age group/gender: 29  Indication: IMPAIRED    Low Tech AAC Dyamic Evaluation:   Select from y/n page when given a direct prompt (point to yes; point to no): 2/2 IND  Point to items in a field of 20+ items: 0/3, extensive cues provided  Point to items in a field of 4 following overview: 1/4, extensive cues provided    Impressions    Impressions:   Patient presents with mild-mod oropharyngeal dysphagia as described in MBSS study above. In addition, findings today suggest impaired AP movement required for successful bolus manipulation and pharyngeal swallow initiation. Pt presents with increased airway sensitivity/reaction via coughing and throat clearing during open cup sips of thin liquids. This was reduced with use of straw, though pt had a hard time manipulating the straw. Noted pt leans forward while eating and drinking - he benefited from tactile postural cues to correct. Mildly dis-coordinated mastication with reduced bolus breakdown and formation resulting in mild amounts of diffuse oral residue. Swallowing appears effortful and pt is often heard gulping, which can be indicative of intraoral pressure deficits (respiratory coordination dysfunction). His anterior and posterior lingual strength and labial strength is moderately impaired when compared to age norms. Patient presents with a severe communication impairment in terms of access to both receptive and expressive input/output. This limits him significantly in social and functional contexts. He relies on his wife to communicate with him and for him.  He uses verbal language occasionally but is limited to single words and short phrases, which are severely unintelligible. He is able to read short, simple sentences with simplified language, evidenced by his ability to follow various written directions using a dictation neeraj ([employer]) during approx 75% of opportunities throughout the evaluation today. He is able to read lips for short single phrases within contexts, with use of exaggerated oral movements and tactile + visual cue to look at the speaker. He is seemingly able to detect and process some speech sounds (?) but it is unclear how much auditory input he is hearing, and what he is able to process neurologically. Pt has been eligible for cochlear implant for 10 years per spousal report, but just agreed to the implant this year. It is reasonable to suggest that the patient is experiencing cognitive deficits as a result of prolonged hearing loss w/o intervention. It is unclear how severe his cognitive deficits are given the complexity of his presentation. We discussed the importance of having a functional form of communication beyond y/n and gesturing (if possible) and started the discussion of low-tech AAC for both receptive and expressive input. Pt showed some stimulability for low tech AAC when simplified during session today, but more trials will be required in the future. Recommendations:  Pt would benefit from skilled ST services for Dysphagia and Cognitive-communication/AAC order to Enhance functional communication, Increase social participation, Improve QOL, Support safety and efficiency of swallowing , Improve comfort during meal times , and Provide pt/CG education. LRD continues to be mech/SBS solids and thin liquids.      Rehabilitation prognosis: Fair rehab potential to reach the established goals        Goals & Intervention Plan    Long-term goals:  Pt will improve safety and efficiency of swallowing the LRD when provided with restorative and compensatory strategies/exercises. Pt will improve cognitive communication via any communication modality for expressive and receptive input when provided with access to various options. Short-term goals:    Pt will complete oropharyngeal strengthening exercises (e.g., with IOPI) when given max v/v/t cues and biofeedback as able in order to improve efficiency of swallowing on LRD including mech/SBS and thin liquids. Pt and CG will demonstrate understanding of compensatory swallow strategies including postural changes, bolus size, use of various adaptive equipment (may include use of straw vs open cup; provale cup), appropriate pacing, oral care, etc. during 80% of opportunities given min v/v cues. Pt will complete respiratory strengthening exercises 3 sets of 10 to support intraoral pressure required for safe/effective swallowing as well as respiratory strength for protective mechanisms (cough) when given max v/v/t cues. Pt will use functional AAC board with written text or images to communicate basic wants/needs when provided with a direct prompt with field of options <10-12 given mod v/v/t cues. Pt will follow 1-2 step directions with 75% accuracy when provided with a written prompt given mod v/v cues as needed. 6. Pt will participate in further low-tech AAC trials as needed in order to determine most appropriate set up for carryover. 7. Pt will provided a head gesture y/n response or point to a y/n image when provided with a written or verbal prompt with 75% accuracy given mod /v cues.        Frequency: 1-2x/week  Duration: 6-8 weeks    Intervention certification from: 35/88/9412  Intervention certification to: 23/1/1073    Intervention comments:   Wife present for sessions

## 2023-10-17 ENCOUNTER — OFFICE VISIT (OUTPATIENT)
Dept: SPEECH THERAPY | Facility: CLINIC | Age: 83
End: 2023-10-17
Payer: MEDICARE

## 2023-10-17 DIAGNOSIS — H90.3 SENSORINEURAL HEARING LOSS (SNHL), BILATERAL: ICD-10-CM

## 2023-10-17 DIAGNOSIS — F80.9 SPEECH AND LANGUAGE DEFICITS: Primary | ICD-10-CM

## 2023-10-17 DIAGNOSIS — R41.841 COGNITIVE COMMUNICATION DEFICIT: ICD-10-CM

## 2023-10-17 PROCEDURE — 92507 TX SP LANG VOICE COMM INDIV: CPT

## 2023-10-17 NOTE — PROGRESS NOTES
Speech Treatment Note    Today's date: 10/17/2023  Patient’s name: Andrea Sosa  : 1940  MRN: [id number]  Safety measures: SN hearing loss, fall risk   Referring provider: RONY Velez    Visit: # 2      Subjective/Behavioral    Pleasant and Cooperative    Often relies on his wife Melissa Young) to respond. Pt's spouse commented on his recent audiology appointment - stated it went well. Objective Notes & Comments    See below     Assessment (Treatment Goals)    Long-term goals:  Pt will improve safety and efficiency of swallowing the LRD when provided with restorative and compensatory strategies/exercises. Pt will improve cognitive communication via any communication modality for expressive and receptive input when provided with access to various options. Short-term goals:     Pt will complete oropharyngeal strengthening exercises (e.g., with IOPI) when given max v/v/t cues and biofeedback as able in order to improve efficiency of swallowing on LRD including mech/SBS and thin liquids. 10/17/23: performed anterior IOPI exercises 3 sets of 10 reps with increasing difficulty from 50%-70% of max (from max achieved today-25). Pt needed max v/v/t cues to understand the directions. Pt and CG will demonstrate understanding of compensatory swallow strategies including postural changes, bolus size, use of various adaptive equipment (may include use of straw vs open cup; provale cup), appropriate pacing, oral care, etc. during 80% of opportunities given min v/v cues. Pt will complete respiratory strengthening exercises 3 sets of 10 to support intraoral pressure required for safe/effective swallowing as well as respiratory strength for protective mechanisms (cough) when given max v/v/t cues. Pt will use functional AAC board with written text or images to communicate basic wants/needs when provided with a direct prompt with field of options <10-12 given mod v/v/t cues. 10/17/23: pt responded to various verbal and written prompts during simple, structured conversation during 50% of attempts initially. He improved with gestures to look at mouth and read written information to support input. With repetition and increase in cues, he was able to respond accurately during 90% of attempts. Pt will follow 1-2 step directions with 75% accuracy when provided with a written prompt given mod v/v cues as needed. 6. Pt will participate in further low-tech AAC trials as needed in order to determine most appropriate set up for carryover. 7. Pt will provided a head gesture y/n response or point to a y/n image when provided with a written or verbal prompt with 75% accuracy given mod /v cues.          Frequency: 1-2x/week  Duration: 6-8 weeks     Intervention certification from: 42/31/8829  Intervention certification to: 53/2/8040     Intervention comments:   Wife present for sessions      Plan    Pt was provided with HEP/Activities to support POC and Continue with POC  Consider updated goal for speech sound production for functional words/phrases

## 2023-10-24 ENCOUNTER — APPOINTMENT (OUTPATIENT)
Dept: LAB | Facility: CLINIC | Age: 83
End: 2023-10-24
Payer: MEDICARE

## 2023-10-24 ENCOUNTER — OFFICE VISIT (OUTPATIENT)
Dept: SPEECH THERAPY | Facility: CLINIC | Age: 83
End: 2023-10-24
Payer: MEDICARE

## 2023-10-24 DIAGNOSIS — R13.12 OROPHARYNGEAL DYSPHAGIA: ICD-10-CM

## 2023-10-24 DIAGNOSIS — E78.00 HYPERCHOLESTEROLEMIA: Chronic | ICD-10-CM

## 2023-10-24 DIAGNOSIS — R41.841 COGNITIVE COMMUNICATION DEFICIT: ICD-10-CM

## 2023-10-24 DIAGNOSIS — H90.3 SENSORINEURAL HEARING LOSS (SNHL), BILATERAL: ICD-10-CM

## 2023-10-24 DIAGNOSIS — Z13.29 SCREENING FOR THYROID DISORDER: ICD-10-CM

## 2023-10-24 DIAGNOSIS — F80.9 SPEECH AND LANGUAGE DEFICITS: Primary | ICD-10-CM

## 2023-10-24 LAB
ALBUMIN SERPL BCP-MCNC: 4 G/DL (ref 3.5–5)
ALP SERPL-CCNC: 80 U/L (ref 34–104)
ALT SERPL W P-5'-P-CCNC: 16 U/L (ref 7–52)
ANION GAP SERPL CALCULATED.3IONS-SCNC: 5 MMOL/L
AST SERPL W P-5'-P-CCNC: 16 U/L (ref 13–39)
BASOPHILS # BLD AUTO: 0.04 THOUSANDS/ÂΜL (ref 0–0.1)
BASOPHILS NFR BLD AUTO: 1 % (ref 0–1)
BILIRUB SERPL-MCNC: 0.65 MG/DL (ref 0.2–1)
BUN SERPL-MCNC: 13 MG/DL (ref 5–25)
CALCIUM SERPL-MCNC: 9.3 MG/DL (ref 8.4–10.2)
CHLORIDE SERPL-SCNC: 102 MMOL/L (ref 96–108)
CHOLEST SERPL-MCNC: 187 MG/DL
CO2 SERPL-SCNC: 31 MMOL/L (ref 21–32)
CREAT SERPL-MCNC: 0.69 MG/DL (ref 0.6–1.3)
EOSINOPHIL # BLD AUTO: 0.08 THOUSAND/ÂΜL (ref 0–0.61)
EOSINOPHIL NFR BLD AUTO: 2 % (ref 0–6)
ERYTHROCYTE [DISTWIDTH] IN BLOOD BY AUTOMATED COUNT: 13.4 % (ref 11.6–15.1)
GFR SERPL CREATININE-BSD FRML MDRD: 87 ML/MIN/1.73SQ M
GLUCOSE P FAST SERPL-MCNC: 92 MG/DL (ref 65–99)
HCT VFR BLD AUTO: 44.3 % (ref 36.5–49.3)
HDLC SERPL-MCNC: 67 MG/DL
HGB BLD-MCNC: 14.3 G/DL (ref 12–17)
IMM GRANULOCYTES # BLD AUTO: 0.02 THOUSAND/UL (ref 0–0.2)
IMM GRANULOCYTES NFR BLD AUTO: 0 % (ref 0–2)
LDLC SERPL CALC-MCNC: 112 MG/DL (ref 0–100)
LYMPHOCYTES # BLD AUTO: 1.17 THOUSANDS/ÂΜL (ref 0.6–4.47)
LYMPHOCYTES NFR BLD AUTO: 23 % (ref 14–44)
MCH RBC QN AUTO: 31.2 PG (ref 26.8–34.3)
MCHC RBC AUTO-ENTMCNC: 32.3 G/DL (ref 31.4–37.4)
MCV RBC AUTO: 97 FL (ref 82–98)
MONOCYTES # BLD AUTO: 0.59 THOUSAND/ÂΜL (ref 0.17–1.22)
MONOCYTES NFR BLD AUTO: 12 % (ref 4–12)
NEUTROPHILS # BLD AUTO: 3.09 THOUSANDS/ÂΜL (ref 1.85–7.62)
NEUTS SEG NFR BLD AUTO: 62 % (ref 43–75)
NONHDLC SERPL-MCNC: 120 MG/DL
NRBC BLD AUTO-RTO: 0 /100 WBCS
PLATELET # BLD AUTO: 455 THOUSANDS/UL (ref 149–390)
PMV BLD AUTO: 9.4 FL (ref 8.9–12.7)
POTASSIUM SERPL-SCNC: 4.7 MMOL/L (ref 3.5–5.3)
PROT SERPL-MCNC: 7.2 G/DL (ref 6.4–8.4)
RBC # BLD AUTO: 4.58 MILLION/UL (ref 3.88–5.62)
SODIUM SERPL-SCNC: 138 MMOL/L (ref 135–147)
TRIGL SERPL-MCNC: 40 MG/DL
TSH SERPL DL<=0.05 MIU/L-ACNC: 3.83 UIU/ML (ref 0.45–4.5)
WBC # BLD AUTO: 4.99 THOUSAND/UL (ref 4.31–10.16)

## 2023-10-24 PROCEDURE — 84443 ASSAY THYROID STIM HORMONE: CPT

## 2023-10-24 PROCEDURE — 80053 COMPREHEN METABOLIC PANEL: CPT

## 2023-10-24 PROCEDURE — 80061 LIPID PANEL: CPT

## 2023-10-24 PROCEDURE — 85025 COMPLETE CBC W/AUTO DIFF WBC: CPT

## 2023-10-24 PROCEDURE — 36415 COLL VENOUS BLD VENIPUNCTURE: CPT

## 2023-10-24 PROCEDURE — 92507 TX SP LANG VOICE COMM INDIV: CPT

## 2023-10-24 NOTE — PROGRESS NOTES
Speech Treatment Note    Today's date: 10/24/2023  Patient’s name: Demi Garcia  : 1940  MRN: [id number]  Safety measures: SN hearing loss, fall risk   Referring provider: RONY Puente    Visit: # 3      Subjective/Behavioral    Pleasant and Cooperative    Looks to spouse, [place] Vargas, to respond but improving during sessions. Benefits from tactile cues to look at speaker. Argentina Vargas reports that they have been working hard on carry over practice. Objective Notes & Comments    See below     Assessment (Treatment Goals)    Long-term goals:  Pt will improve safety and efficiency of swallowing the LRD when provided with restorative and compensatory strategies/exercises. Pt will improve cognitive communication via any communication modality for expressive and receptive input when provided with access to various options. Short-term goals:     Pt will complete oropharyngeal strengthening exercises (e.g., with IOPI) when given max v/v/t cues and biofeedback as able in order to improve efficiency of swallowing on LRD including mech/SBS and thin liquids. 10/17/23: performed anterior IOPI exercises 3 sets of 10 reps with increasing difficulty from 50%-70% of max (from max achieved today-25). Pt needed max v/v/t cues to understand the directions. Pt and CG will demonstrate understanding of compensatory swallow strategies including postural changes, bolus size, use of various adaptive equipment (may include use of straw vs open cup; provale cup), appropriate pacing, oral care, etc. during 80% of opportunities given min v/v cues. 10/24/23: small sips strategy - given mod-max v/v cues, pt completed during 8/10 trials; pt coughed during approx 25% of trials during small sip use toward the end of trials - possibly r/t fatigue.  Education provided re coughing is not always a sign of aspiration - could be laryngeal sensitivity or penetration; is a positive sign of possible ejection; etc. Discussed though that reducing coughing is still a goal regardless. 10/24/23: neutral/upright positioning pt achieved 50% of neutral positioning during 4/6 trials given mod/max cues. Pt was reaching for the cup vs. Bringing the cup to the mouth. It is possible that poor positioning is leading to coughing following swallows. Pt will complete respiratory strengthening exercises 3 sets of 10 to support intraoral pressure required for safe/effective swallowing as well as respiratory strength for protective mechanisms (cough) when given max v/v/t cues. Pt will use functional AAC board with written text or images to communicate basic wants/needs when provided with a direct prompt with field of options <10-12 given mod v/v/t cues. 10/17/23: pt responded to various verbal and written prompts during simple, structured conversation during 50% of attempts initially. He improved with gestures to look at mouth and read written information to support input. With repetition and increase in cues, he was able to respond accurately during 90% of attempts. Pt will follow 1-2 step directions with 75% accuracy when provided with a written prompt given mod v/v cues as needed. 6. Pt will participate in further low-tech AAC trials as needed in order to determine most appropriate set up for carryover. 7. Pt will provided a head gesture y/n response or point to a y/n image when provided with a written or verbal prompt with 75% accuracy given mod /v cues. 8. Pt will produce sounds in isolation accurately during 8/10 opportunities given demonstration and additional cues as needed. 10/24/23: focused on /l/, /sh/, and /k/    /l/ - increasing accuracy 6/10   /sh/ - 0/10 - pt with syllable addition (sh - oe) unable to correct  /k/  - 0/10 unable to understand directions to say /k in isolation despite max cues.      9. Pt will produce words in isolation accurately during 8/10 opportunities given demonstration and additional cues as needed. 10/24/23: produced functional/familiar words (words about self, family, names, address, etc.) - produced approx 75% of words in terms of articulatory clarity.        Frequency: 1-2x/week  Duration: 6-8 weeks     Intervention certification from: 16/38/9004  Intervention certification to: 31/6/6853     Intervention comments:   Spouse present for sessions      Plan    Pt was provided with HEP/Activities to support POC and Continue with POC  Consider updated goal for speech sound production for functional words/phrases  /k, sh, l/ sounds; cues for verbal response y/n/okay vs head nod to support use of verbal expression

## 2023-10-26 ENCOUNTER — OFFICE VISIT (OUTPATIENT)
Dept: SPEECH THERAPY | Facility: CLINIC | Age: 83
End: 2023-10-26
Payer: MEDICARE

## 2023-10-26 DIAGNOSIS — R41.841 COGNITIVE COMMUNICATION DEFICIT: ICD-10-CM

## 2023-10-26 DIAGNOSIS — F80.9 SPEECH AND LANGUAGE DEFICITS: Primary | ICD-10-CM

## 2023-10-26 DIAGNOSIS — H90.3 SENSORINEURAL HEARING LOSS (SNHL), BILATERAL: ICD-10-CM

## 2023-10-26 PROCEDURE — 92507 TX SP LANG VOICE COMM INDIV: CPT

## 2023-10-26 NOTE — PROGRESS NOTES
Speech Treatment Note    Today's date: 10/26/2023  Patient’s name: Gisele Nix  : 1940  MRN: [id number]  Safety measures: SN hearing loss, fall risk   Referring provider: RONY Gibson    Visit: # 4      Subjective/Behavioral    Pleasant and Cooperative    Looks to spouse, Sabra Bertrand, to respond but improving during sessions. Benefits from tactile cues to look at speaker. Sabra Bertrand reports that they have been working hard on carry over practice. Objective Notes & Comments    See below     Assessment (Treatment Goals)    Long-term goals:  Pt will improve safety and efficiency of swallowing the LRD when provided with restorative and compensatory strategies/exercises. Pt will improve cognitive communication via any communication modality for expressive and receptive input when provided with access to various options. Short-term goals:     Pt will complete oropharyngeal strengthening exercises (e.g., with IOPI) when given max v/v/t cues and biofeedback as able in order to improve efficiency of swallowing on LRD including mech/SBS and thin liquids. 10/17/23: performed anterior IOPI exercises 3 sets of 10 reps with increasing difficulty from 50%-70% of max (from max achieved today-25). Pt needed max v/v/t cues to understand the directions. 10/26/23: performed anterior IOPI exercises 3 sets of 10 at 80% of max achieved today (18). Improved direction following during today's session - less cues required. Pt and CG will demonstrate understanding of compensatory swallow strategies including postural changes, bolus size, use of various adaptive equipment (may include use of straw vs open cup; provale cup), appropriate pacing, oral care, etc. during 80% of opportunities given min v/v cues.    10/24/23: small sips strategy - given mod-max v/v cues, pt completed during 8/10 trials; pt coughed during approx 25% of trials during small sip use toward the end of trials - possibly r/t fatigue. Education provided re coughing is not always a sign of aspiration - could be laryngeal sensitivity or penetration; is a positive sign of possible ejection; etc. Discussed though that reducing coughing is still a goal regardless. 10/24/23: neutral/upright positioning pt achieved 50% of neutral positioning during 4/6 trials given mod/max cues. Pt was reaching for the cup vs. Bringing the cup to the mouth. It is possible that poor positioning is leading to coughing following swallows. Pt will complete respiratory strengthening exercises 3 sets of 10 to support intraoral pressure required for safe/effective swallowing as well as respiratory strength for protective mechanisms (cough) when given max v/v/t cues. Pt will use functional AAC board with written text or images to communicate basic wants/needs when provided with a direct prompt with field of options <10-12 given mod v/v/t cues. 10/17/23: pt responded to various verbal and written prompts during simple, structured conversation during 50% of attempts initially. He improved with gestures to look at mouth and read written information to support input. With repetition and increase in cues, he was able to respond accurately during 90% of attempts. Pt will follow 1-2 step directions with 75% accuracy when provided with a written prompt given mod v/v cues as needed. 6. Pt will participate in further low-tech AAC trials as needed in order to determine most appropriate set up for carryover. 7. Pt will provided a head gesture y/n response or point to a y/n image when provided with a written or verbal prompt with 75% accuracy given mod /v cues. 10/26/23: pt gestures yes and no to questions when asked during 80% of opportunities with repetitions of questions as needed    8. Pt will produce sounds in isolation accurately during 8/10 opportunities given demonstration and additional cues as needed.    10/24/23: focused on /l/, /sh/, and /k/ /l/ - increasing accuracy 6/10   /sh/ - 0/10 - pt with syllable addition (sh - oe) unable to correct  /k/  - 0/10 unable to understand directions to say /k in isolation despite max cues. 9. Pt will produce words in isolation accurately during 8/10 opportunities given demonstration and additional cues as needed. 10/24/23: produced functional/familiar words (words about self, family, names, address, etc.) - produced approx 75% of words in terms of articulatory clarity. 10/26/23: pt produced words in isolation when given a picture during 75% of opportunities in terms of intelligibility given max visual cues.        Frequency: 1-2x/week  Duration: 6-8 weeks     Intervention certification from: 74/30/8318  Intervention certification to: 09/2/8070     Intervention comments:   Spouse present for sessions      Plan    Pt was provided with HEP/Activities to support POC and Continue with POC  Consider updated goal for speech sound production for functional words/phrases  /k, sh, l/ sounds; cues for verbal response y/n/okay vs head nod to support use of verbal expression  HEP included /m/ and /b/

## 2023-10-27 ENCOUNTER — OFFICE VISIT (OUTPATIENT)
Dept: FAMILY MEDICINE CLINIC | Facility: CLINIC | Age: 83
End: 2023-10-27
Payer: MEDICARE

## 2023-10-27 VITALS
OXYGEN SATURATION: 98 % | TEMPERATURE: 97 F | DIASTOLIC BLOOD PRESSURE: 62 MMHG | WEIGHT: 140.8 LBS | HEIGHT: 71 IN | HEART RATE: 88 BPM | BODY MASS INDEX: 19.71 KG/M2 | SYSTOLIC BLOOD PRESSURE: 114 MMHG

## 2023-10-27 DIAGNOSIS — Z23 ENCOUNTER FOR IMMUNIZATION: ICD-10-CM

## 2023-10-27 DIAGNOSIS — Z00.00 MEDICARE ANNUAL WELLNESS VISIT, SUBSEQUENT: Primary | ICD-10-CM

## 2023-10-27 DIAGNOSIS — E78.00 HYPERCHOLESTEROLEMIA: Chronic | ICD-10-CM

## 2023-10-27 DIAGNOSIS — F80.9 SPEECH AND LANGUAGE DEFICITS: ICD-10-CM

## 2023-10-27 PROCEDURE — G0439 PPPS, SUBSEQ VISIT: HCPCS | Performed by: NURSE PRACTITIONER

## 2023-10-27 PROCEDURE — G0008 ADMIN INFLUENZA VIRUS VAC: HCPCS | Performed by: NURSE PRACTITIONER

## 2023-10-27 PROCEDURE — 99213 OFFICE O/P EST LOW 20 MIN: CPT | Performed by: NURSE PRACTITIONER

## 2023-10-27 PROCEDURE — 90662 IIV NO PRSV INCREASED AG IM: CPT | Performed by: NURSE PRACTITIONER

## 2023-10-27 NOTE — PATIENT INSTRUCTIONS
Medicare Preventive Visit Patient Instructions  Thank you for completing your Welcome to Medicare Visit or Medicare Annual Wellness Visit today. Your next wellness visit will be due in one year (10/27/2024). The screening/preventive services that you may require over the next 5-10 years are detailed below. Some tests may not apply to you based off risk factors and/or age. Screening tests ordered at today's visit but not completed yet may show as past due. Also, please note that scanned in results may not display below. Preventive Screenings:  Service Recommendations Previous Testing/Comments   Colorectal Cancer Screening  Colonoscopy    Fecal Occult Blood Test (FOBT)/Fecal Immunochemical Test (FIT)  Fecal DNA/Cologuard Test  Flexible Sigmoidoscopy Age: 43-73 years old   Colonoscopy: every 10 years (May be performed more frequently if at higher risk)  OR  FOBT/FIT: every 1 year  OR  Cologuard: every 3 years  OR  Sigmoidoscopy: every 5 years  Screening may be recommended earlier than age 39 if at higher risk for colorectal cancer. Also, an individualized decision between you and your healthcare provider will decide whether screening between the ages of 77-80 would be appropriate.  Colonoscopy: Not on file  FOBT/FIT: Not on file  Cologuard: Not on file  Sigmoidoscopy: Not on file    Risks and Benefits Discussed     Prostate Cancer Screening Individualized decision between patient and health care provider in men between ages of 53-66   Medicare will cover every 12 months beginning on the day after your 50th birthday PSA: 0.5 ng/mL     Screening Not Indicated     Hepatitis C Screening Once for adults born between 1945 and 1965  More frequently in patients at high risk for Hepatitis C Hep C Antibody: Not on file    Risks and Benefits Discussed   Diabetes Screening 1-2 times per year if you're at risk for diabetes or have pre-diabetes Fasting glucose: 92 mg/dL (10/24/2023)  A1C: No results in last 5 years (No results in last 5 years)  Screening Current   Cholesterol Screening Once every 5 years if you don't have a lipid disorder. May order more often based on risk factors. Lipid panel: 10/24/2023  Screening Not Indicated  History Lipid Disorder      Other Preventive Screenings Covered by Medicare:  Abdominal Aortic Aneurysm (AAA) Screening: covered once if your at risk. You're considered to be at risk if you have a family history of AAA or a male between the age of 70-76 who smoking at least 100 cigarettes in your lifetime. Lung Cancer Screening: covers low dose CT scan once per year if you meet all of the following conditions: (1) Age 48-67; (2) No signs or symptoms of lung cancer; (3) Current smoker or have quit smoking within the last 15 years; (4) You have a tobacco smoking history of at least 20 pack years (packs per day x number of years you smoked); (5) You get a written order from a healthcare provider. Glaucoma Screening: covered annually if you're considered high risk: (1) You have diabetes OR (2) Family history of glaucoma OR (3)  aged 48 and older OR (3)  American aged 72 and older  Osteoporosis Screening: covered every 2 years if you meet one of the following conditions: (1) Have a vertebral abnormality; (2) On glucocorticoid therapy for more than 3 months; (3) Have primary hyperparathyroidism; (4) On osteoporosis medications and need to assess response to drug therapy. HIV Screening: covered annually if you're between the age of 14-79. Also covered annually if you are younger than 13 and older than 72 with risk factors for HIV infection. For pregnant patients, it is covered up to 3 times per pregnancy.     Immunizations:  Immunization Recommendations   Influenza Vaccine Annual influenza vaccination during flu season is recommended for all persons aged >= 6 months who do not have contraindications   Pneumococcal Vaccine   * Pneumococcal conjugate vaccine = PCV13 (Prevnar 13), PCV15 (Vaxneuvance), PCV20 (Prevnar 21)  * Pneumococcal polysaccharide vaccine = PPSV23 (Pneumovax) Adults 86-57 yo with certain risk factors or if 69+ yo  If never received any pneumonia vaccine: recommend Prevnar 20 (PCV20)  Give PCV20 if previously received 1 dose of PCV13 or PPSV23   Hepatitis B Vaccine 3 dose series if at intermediate or high risk (ex: diabetes, end stage renal disease, liver disease)   Respiratory syncytial virus (RSV) Vaccine - COVERED BY MEDICARE PART D  * RSVPreF3 (Arexvy) CDC recommends that adults 61years of age and older may receive a single dose of RSV vaccine using shared clinical decision-making (SCDM)   Tetanus (Td) Vaccine - COST NOT COVERED BY MEDICARE PART B Following completion of primary series, a booster dose should be given every 10 years to maintain immunity against tetanus. Td may also be given as tetanus wound prophylaxis. Tdap Vaccine - COST NOT COVERED BY MEDICARE PART B Recommended at least once for all adults. For pregnant patients, recommended with each pregnancy. Shingles Vaccine (Shingrix) - COST NOT COVERED BY MEDICARE PART B  2 shot series recommended in those 19 years and older who have or will have weakened immune systems or those 50 years and older     Health Maintenance Due:  There are no preventive care reminders to display for this patient. Immunizations Due:      Topic Date Due   • COVID-19 Vaccine (4 - Moderna series) 12/27/2021   • Influenza Vaccine (1) 09/01/2023     Advance Directives   What are advance directives? Advance directives are legal documents that state your wishes and plans for medical care. These plans are made ahead of time in case you lose your ability to make decisions for yourself. Advance directives can apply to any medical decision, such as the treatments you want, and if you want to donate organs. What are the types of advance directives? There are many types of advance directives, and each state has rules about how to use them. You may choose a combination of any of the following:  Living will: This is a written record of the treatment you want. You can also choose which treatments you do not want, which to limit, and which to stop at a certain time. This includes surgery, medicine, IV fluid, and tube feedings. Durable power of  for healthcare [facility]): This is a written record that states who you want to make healthcare choices for you when you are unable to make them for yourself. This person, called a proxy, is usually a family member or a friend. You may choose more than 1 proxy. Do not resuscitate (DNR) order:  A DNR order is used in case your heart stops beating or you stop breathing. It is a request not to have certain forms of treatment, such as CPR. A DNR order may be included in other types of advance directives. Medical directive: This covers the care that you want if you are in a coma, near death, or unable to make decisions for yourself. You can list the treatments you want for each condition. Treatment may include pain medicine, surgery, blood transfusions, dialysis, IV or tube feedings, and a ventilator (breathing machine). Values history: This document has questions about your views, beliefs, and how you feel and think about life. This information can help others choose the care that you would choose. Why are advance directives important? An advance directive helps you control your care. Although spoken wishes may be used, it is better to have your wishes written down. Spoken wishes can be misunderstood, or not followed. Treatments may be given even if you do not want them. An advance directive may make it easier for your family to make difficult choices about your care. © Copyright [employer] 2018 Information is for End User's use only and may not be sold, redistributed or otherwise used for commercial purposes.  All illustrations and images included in CareNotes® are the copyrighted property of [employer] or [facility]

## 2023-10-27 NOTE — PROGRESS NOTES
Assessment and Plan:     Problem List Items Addressed This Visit        Other    Hypercholesterolemia (Chronic)   Other Visit Diagnoses     Medicare annual wellness visit, subsequent    -  Primary    Encounter for immunization        Relevant Orders    influenza vaccine, high-dose, PF 0.7 mL (FLUZONE HIGH-DOSE) (Completed)    Speech and language deficits              Depression Screening and Follow-up Plan: Patient was screened for depression during today's encounter. They screened negative with a PHQ-2 score of 0. Preventive health issues were discussed with patient, and age appropriate screening tests were ordered as noted in patient's After Visit Summary. Personalized health advice and appropriate referrals for health education or preventive services given if needed, as noted in patient's After Visit Summary. History of Present Illness:     Patient presents for a Medicare Wellness Visit    Patient presents for [facility] and routine follow up. Overall doing well. Has not been complaining of sore throat anymore. Does still have some coughing when eating but is now following with speech therapy after barium swallow showed ;"Pt presents with mild-moderate oropharyngeal dysphagia characterized by very weak, uncoordinated, and decreased BOT drive resulting in severe valleculae retention that demonstrates minimal improvements with subsequent swallows and liquid wash. Patient also demonstrates UES weakness/laxity resulting in trace amounts of residue during dense solids (sandwich). Thin liquid wash yielded mild improvement in valleculae retention of solids, no penetration occurs though risk is present. Epiglottis does cover airway completely but minimal tongue drive results in minimal changes to valleculae retention. Patient would benefit from mechanical soft solids, thin liquids, strategies listed below and initiation of speech/swallow therapy for lingual strengthening.   Patient's tongue weakness influences his overall speech intelligibility, swallow safety and would increase his ability to communicate."  He is doing well so far with speech therapy. Hyperlipidemia- LDL Is 112. No significant changes recommended at this time as his oral intake is limited due to his swallowing. Patient Care Team:  RONY Still as PCP - General (Family Medicine)     Review of Systems:     Review of Systems   Unable to perform ROS: Other (answers from his wife)   Constitutional:  Negative for activity change, appetite change, diaphoresis, fatigue, fever and unexpected weight change. HENT:  Positive for trouble swallowing. Negative for congestion, mouth sores, rhinorrhea, sinus pain and voice change. Respiratory:  Negative for apnea, cough, chest tightness, shortness of breath and wheezing. Cardiovascular:  Negative for chest pain, palpitations and leg swelling. Gastrointestinal:  Negative for abdominal distention, abdominal pain, blood in stool, constipation, diarrhea, nausea and vomiting. Genitourinary:  Negative for decreased urine volume, difficulty urinating, frequency and urgency. Musculoskeletal:  Negative for arthralgias, myalgias, neck pain and neck stiffness. Skin:  Negative for color change, rash and wound. Neurological:  Negative for weakness and light-headedness. Problem List:     Patient Active Problem List   Diagnosis   • Hypercholesterolemia   • Borderline hypertension   • Negative depression screening   • Overweight (BMI 25.0-29. 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   • Traumatic subdural hemorrhage with loss of consciousness of unspecified duration, initial encounter [facility])      Past Medical and Surgical History:     Past Medical History:   Diagnosis Date   • Arthritis    • Encounter for general adult medical examination without abnormal findings 03/20/2019   • Fall 11/03/2022   • Hyperlipidemia    • Macular degeneration, 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[address])    • Urinary retention 06/02/2022     Past Surgical History:   Procedure Laterality Date   • BRAIN HEMATOMA EVACUATION Left 05/28/2022    Procedure: left CRANIOTOMY FOR SUBDURAL HEMATOMA;  Surgeon: Corwin Simeon MD;  Location:  MAIN OR;  Service: Neurosurgery   • CARPAL TUNNEL RELEASE     • HERNIA REPAIR Right     inguinal   • IR CEREBRAL ANGIOGRAPHY / INTERVENTION  06/02/2022   • CO COCHLEAR DEVICE IMPLANTATION W/WO MASTOIDECTOMY Left 1/17/2023    Procedure: LEFT COCHLEAR IMPLANTATION WITH MASTOIDECTOMY AND FACIAL NERVE MONITORING WITH AUDIOMETRIC TESTING OF THE IMPLANT;  Surgeon: Yasmin Petersen MD;  Location:  MAIN OR;  Service: ENT   • CO NEUROPLASTY &/TRANSPOS MEDIAN NRV CARPAL Warriormine Cyril Right 09/20/2021    Procedure: RELEASE CARPAL TUNNEL;  Surgeon: Fermín Brar MD;  Location: [facility] MAIN OR;  Service: Orthopedics      Family History:     History reviewed. No pertinent family history. 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[employer]   Tobacco Use   • Smoking status: Former   • Smokeless tobacco: Never   • Tobacco comments:     Smoked as a teenager   Vaping Use   • Vaping Use: Never used   Substance and Sexual Activity   • Alcohol use:  Yes     Alcohol/week: 3.0 standard drinks of alcohol     Types: 3 Cans of beer per week   • Drug use: No   • Sexual activity: None   Other Topics Concern   • None   Social History Narrative   • None     Social Determinants of Health     Financial Resource Strain: Low Risk  (9/1/2022)    Overall Financial Resource Strain (CARDIA)    • Difficulty of Paying Living Expenses: Not hard at all   Food Insecurity: Not on file   Transportation Needs: No Transportation Needs (9/1/2022)    PRAPARE - Transportation    • Lack of Transportation (Medical): No    • Lack of Transportation (Non-Medical): No   Physical Activity: Not on file   Stress: Not on file   Social Connections: Not on file   Intimate Partner Violence: Not on file   Housing Stability: Not on file      Medications and Allergies:     Current Outpatient Medications   Medication Sig Dispense Refill   • acetaminophen (TYLENOL) 325 mg tablet Take 2 tablets (650 mg total) by mouth every 6 (six) hours as needed for mild pain  0   • loratadine (CLARITIN) 10 mg tablet Take 1 tablet (10 mg total) by mouth daily 30 tablet 3   • Multiple Vitamin (MULTIVITAMIN) tablet Take by mouth daily      • Multiple Vitamins-Minerals (PRESERVISION AREDS 2 PO) Take by mouth     • simvastatin (ZOCOR) 20 mg tablet Take 1 tablet (20 mg total) by mouth daily at bedtime 90 tablet 2     No current facility-administered medications for this visit. No Known Allergies   Immunizations:     Immunization History   Administered Date(s) Administered   • COVID-19 MODERNA VACC 0.25 ML IM BOOSTER 11/01/2021   • COVID-19 MODERNA VACC 0.5 ML IM 01/19/2021, 02/17/2021   • INFLUENZA 10/11/2017, 09/05/2019, 10/26/2022   • Influenza, high dose seasonal 0.7 mL 09/20/2018, 10/12/2020, 12/06/2021, 10/26/2022, 10/27/2023   • Pneumococcal 11/12/2012, 01/06/2017   • Pneumococcal Conjugate 13-Valent 06/12/2020   • Pneumococcal Polysaccharide PPV23 07/08/2021   • Tdap 04/08/2021      Health Maintenance: There are no preventive care reminders to display for this patient.       Topic Date Due   • COVID-19 Vaccine (4 - Moderna series) 12/27/2021      Medicare Screening Tests and Risk Assessments:     Storm Horner is here for his Subsequent Wellness visit. Last Medicare Wellness visit information reviewed, patient interviewed and updates made to the record today. Health Risk Assessment:   Patient rates overall health as fair. Patient feels that their physical health rating is same. Patient is very satisfied with their life. Eyesight was rated as same. Hearing was rated as same. Patient feels that their emotional and mental health rating is same. Patients states they are never, rarely angry. Patient states they are never, rarely unusually tired/fatigued. Pain experienced in the last 7 days has been none. Patient states that he has experienced no weight loss or gain in last 6 months. Depression Screening:   PHQ-2 Score: 0      Fall Risk Screening: In the past year, patient has experienced: no history of falling in past year      Home Safety:  Patient does not have trouble with stairs inside or outside of their home. Patient has working smoke alarms and has working carbon monoxide detector. Home safety hazards include: none. Nutrition:   Current diet is Regular. Soft diet    Medications:   Patient is currently taking over-the-counter supplements. OTC medications include: see medication list. Patient is able to manage medications. Activities of Daily Living (ADLs)/Instrumental Activities of Daily Living (IADLs):   Walk and transfer into and out of bed and chair?: Yes  Dress and groom yourself?: Yes    Bathe or shower yourself?: Yes    Feed yourself? Yes  Do your laundry/housekeeping?: No  Manage your money, pay your bills and track your expenses?: No  Make your own meals?: No    Do your own shopping?: No    Previous Hospitalizations:   Any hospitalizations or ED visits within the last 12 months?: No      Advance Care Planning:   Living will: Yes    Durable POA for healthcare:  Yes    Advanced directive: Yes    Five wishes given: No      Cognitive Screening: Provider or family/friend/caregiver concerned regarding cognition?: No    PREVENTIVE SCREENINGS      Cardiovascular Screening:    General: Screening Not Indicated and History Lipid Disorder      Diabetes Screening:     General: Screening Current      Colorectal Cancer Screening:     General: Risks and Benefits Discussed      Prostate Cancer Screening:    General: Screening Not Indicated      Osteoporosis Screening:    General: Risks and Benefits Discussed      Abdominal Aortic Aneurysm (AAA) Screening:    Risk factors include: tobacco use        General: Screening Not Indicated      Lung Cancer Screening:     General: Screening Not Indicated      Hepatitis C Screening:    General: Risks and Benefits Discussed    Screening, Brief Intervention, and Referral to Treatment (SBIRT)    Screening  Typical number of drinks in a day: 0  Typical number of drinks in a week: 1  Interpretation: Low risk drinking behavior. Single Item Drug Screening:  How often have you used an illegal drug (including marijuana) or a prescription medication for non-medical reasons in the past year? never    Single Item Drug Screen Score: 0  Interpretation: Negative screen for possible drug use disorder    No results found. Physical Exam:     /62 (BP Location: Left arm, Patient Position: Sitting)   Pulse 88   Temp (!) 97 °F (36.1 °C) (Tympanic)   Ht 5' 11" (1.803 m)   Wt 63.9 kg (140 lb 12.8 oz)   SpO2 98%   BMI 19.64 kg/m²     Physical Exam  Vitals and nursing note reviewed. Constitutional:       General: He is not in acute distress. Appearance: Normal appearance. He is well-developed. HENT:      Head: Normocephalic and atraumatic. Ears:      Comments: Cochlear implant      Nose: Nose normal. No congestion or rhinorrhea. Mouth/Throat:      Mouth: Mucous membranes are moist.      Pharynx: Oropharynx is clear. No oropharyngeal exudate or posterior oropharyngeal erythema.    Eyes:      General: No scleral icterus. Right eye: No discharge. Left eye: No discharge. Extraocular Movements: Extraocular movements intact. Conjunctiva/sclera: Conjunctivae normal.      Pupils: Pupils are equal, round, and reactive to light. Cardiovascular:      Rate and Rhythm: Normal rate and regular rhythm. Pulses: Normal pulses. Heart sounds: Normal heart sounds. No murmur heard. Pulmonary:      Effort: Pulmonary effort is normal. No respiratory distress. Breath sounds: Normal breath sounds. Abdominal:      General: Abdomen is flat. Bowel sounds are normal.      Palpations: Abdomen is soft. Tenderness: There is no abdominal tenderness. There is no guarding. Musculoskeletal:         General: No swelling. Cervical back: Normal range of motion and neck supple. No rigidity or tenderness. Right lower leg: No edema. Left lower leg: No edema. Lymphadenopathy:      Cervical: No cervical adenopathy. Skin:     General: Skin is warm and dry. Capillary Refill: Capillary refill takes less than 2 seconds. Findings: No bruising or lesion. Neurological:      General: No focal deficit present. Mental Status: He is alert and oriented to person, place, and time. Mental status is at baseline. Motor: No weakness. Gait: Gait normal.   Psychiatric:         Mood and Affect: Mood normal.         Behavior: Behavior normal.         Thought Content:  Thought content normal.         Judgment: Judgment normal.          RONY Law

## 2023-10-31 ENCOUNTER — OFFICE VISIT (OUTPATIENT)
Dept: SPEECH THERAPY | Facility: CLINIC | Age: 83
End: 2023-10-31
Payer: MEDICARE

## 2023-10-31 DIAGNOSIS — H90.3 SENSORINEURAL HEARING LOSS (SNHL), BILATERAL: ICD-10-CM

## 2023-10-31 DIAGNOSIS — R41.841 COGNITIVE COMMUNICATION DEFICIT: ICD-10-CM

## 2023-10-31 DIAGNOSIS — R13.12 OROPHARYNGEAL DYSPHAGIA: ICD-10-CM

## 2023-10-31 DIAGNOSIS — F80.9 SPEECH AND LANGUAGE DEFICITS: Primary | ICD-10-CM

## 2023-10-31 PROCEDURE — 92507 TX SP LANG VOICE COMM INDIV: CPT

## 2023-10-31 NOTE — PROGRESS NOTES
Speech Treatment Note    Today's date: 10/31/2023  Patient’s name: Desmond Dunaway  : 1940  MRN: [id number]  Safety measures: SN hearing loss, fall risk   Referring provider: RONY Browne    Visit: # 5      Subjective/Behavioral    Pleasant and Cooperative    Looks to spouse, Brittnee Staples, to respond but improving during sessions - pt more readily answers questions for himself. Benefits from tactile cues to look at speaker as well as additional processing time. Brittnee Staples reports reduced time to work on carryover activities but will try to get to it this week. 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 Significant improvement today with increasing # of utterances and improved response time. Assessment (Treatment Goals)    Long-term goals:  Pt will improve safety and efficiency of swallowing the LRD when provided with restorative and compensatory strategies/exercises. Pt will improve cognitive communication via any communication modality for expressive and receptive input when provided with access to various options. Short-term goals:     Pt will complete oropharyngeal strengthening exercises (e.g., with IOPI) when given max v/v/t cues and biofeedback as able in order to improve efficiency of swallowing on LRD including mech/SBS and thin liquids. 10/17/23: performed anterior IOPI exercises 3 sets of 10 reps with increasing difficulty from 50%-70% of max (from max achieved today-25). Pt needed max v/v/t cues to understand the directions. 10/26/23: performed anterior IOPI exercises 3 sets of 10 at 80% of max achieved today (18). Improved direction following during today's session - less cues required. 10/30/23: IOPI trials: anterior max today = 16; reps completed at 80% of max - 4 sets of 10.  Attempted to increase to 90% of max however pt unable to complete more than 2 reps at this pressure. Pt and CG will demonstrate understanding of compensatory swallow strategies including postural changes, bolus size, use of various adaptive equipment (may include use of straw vs open cup; provale cup), appropriate pacing, oral care, etc. during 80% of opportunities given min v/v cues. 10/24/23: small sips strategy - given mod-max v/v cues, pt completed during 8/10 trials; pt coughed during approx 25% of trials during small sip use toward the end of trials - possibly r/t fatigue. Education provided re coughing is not always a sign of aspiration - could be laryngeal sensitivity or penetration; is a positive sign of possible ejection; etc. Discussed though that reducing coughing is still a goal regardless. 10/24/23: neutral/upright positioning pt achieved 50% of neutral positioning during 4/6 trials given mod/max cues. Pt was reaching for the cup vs. Bringing the cup to the mouth. It is possible that poor positioning is leading to coughing following swallows. Pt will complete respiratory strengthening exercises 3 sets of 10 to support intraoral pressure required for safe/effective swallowing as well as respiratory strength for protective mechanisms (cough) when given max v/v/t cues. Pt will use functional AAC board with written text or images to communicate basic wants/needs when provided with a direct prompt with field of options <10-12 given mod v/v/t cues. 10/17/23: pt responded to various verbal and written prompts during simple, structured conversation during 50% of attempts initially. He improved with gestures to look at mouth and read written information to support input. With repetition and increase in cues, he was able to respond accurately during 90% of attempts. Pt will follow 1-2 step directions with 75% accuracy when provided with a written prompt given mod v/v cues as needed.      6. Pt will participate in further low-tech AAC trials as needed in order to determine most appropriate set up for carryover. 7. Pt will provided a head gesture y/n response or point to a y/n image when provided with a written or verbal prompt with 75% accuracy given mod /v cues. 10/26/23: pt gestures yes and no to questions when asked during 80% of opportunities with repetitions of questions as needed    8. Pt will produce sounds in isolation accurately during 8/10 opportunities given demonstration and additional cues as needed. 10/24/23: focused on /l/, /sh/, and /k/    /l/ - increasing accuracy 6/10   /sh/ - 0/10 - pt with syllable addition (sh - oe) unable to correct  /k/  - 0/10 unable to understand directions to say /k in isolation despite max cues. 9. Pt will produce words in isolation accurately during 8/10 opportunities given demonstration and additional cues as needed. 10/24/23: produced functional/familiar words (words about self, family, names, address, etc.) - produced approx 75% of words in terms of articulatory clarity. 10/26/23: pt produced words in isolation when given a picture during 75% of opportunities in terms of intelligibility given max visual cues. 10/30/23: pt produced words in isolation accurately when asked a direct question during 90% of opportunities in terms of general intelligibility, demonstrating improvement.    10/30/23: pt produced words in short phrases (2-3 words) with clarity during 70% of opportunities in terms of general intelligibility across the phrase - demonstrating improvement       Frequency: 1-2x/week  Duration: 6-8 weeks     Intervention certification from: 59/49/5030  Intervention certification to: 54/2/5011     Intervention comments:   Spouse present for sessions      Plan    Pt was provided with HEP/Activities to support POC and Continue with POC  Consider updated goal for speech sound production for functional words/phrases  /k, sh, l/ sounds; cues for verbal response y/n/okay vs head nod to support use of verbal expression  HEP included /m/ and /b/

## 2023-11-02 ENCOUNTER — OFFICE VISIT (OUTPATIENT)
Dept: SPEECH THERAPY | Facility: CLINIC | Age: 83
End: 2023-11-02
Payer: MEDICARE

## 2023-11-02 DIAGNOSIS — R13.12 OROPHARYNGEAL DYSPHAGIA: ICD-10-CM

## 2023-11-02 DIAGNOSIS — R41.841 COGNITIVE COMMUNICATION DEFICIT: ICD-10-CM

## 2023-11-02 DIAGNOSIS — F80.9 SPEECH AND LANGUAGE DEFICITS: Primary | ICD-10-CM

## 2023-11-02 PROCEDURE — 92526 ORAL FUNCTION THERAPY: CPT

## 2023-11-02 PROCEDURE — 92507 TX SP LANG VOICE COMM INDIV: CPT

## 2023-11-02 NOTE — PROGRESS NOTES
Speech Treatment Note    Today's date: 2023  Patient’s name: Nai Gupta  : 1940  MRN: [id number]  Safety measures: SN hearing loss, fall risk   Referring provider: RONY Owen    Visit: # 6      Subjective/Behavioral    Pleasant and Cooperative    Looks to spouse, Robert Wheeler, to respond but improving during sessions - pt more readily answers questions for himself. Benefits from tactile cues to look at speaker as well as additional processing time. 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 Significant improvement today with increasing # of utterances and improved response time. Assessment (Treatment Goals)    Long-term goals:  Pt will improve safety and efficiency of swallowing the LRD when provided with restorative and compensatory strategies/exercises. Pt will improve cognitive communication via any communication modality for expressive and receptive input when provided with access to various options. Short-term goals:     Pt will complete oropharyngeal strengthening exercises (e.g., with IOPI) when given max v/v/t cues and biofeedback as able in order to improve efficiency of swallowing on LRD including mech/SBS and thin liquids. 10/17/23: performed anterior IOPI exercises 3 sets of 10 reps with increasing difficulty from 50%-70% of max (from max achieved today-25). Pt needed max v/v/t cues to understand the directions. 10/26/23: performed anterior IOPI exercises 3 sets of 10 at 80% of max achieved today (18). Improved direction following during today's session - less cues required. 10/30/23: IOPI trials: anterior max today = 16; reps completed at 80% of max - 4 sets of 10. Attempted to increase to 90% of max however pt unable to complete more than 2 reps at this pressure.       Pt and CG will demonstrate understanding of compensatory swallow strategies including postural changes, bolus size, use of various adaptive equipment (may include use of straw vs open cup; provale cup), appropriate pacing, oral care, etc. during 80% of opportunities given min v/v cues. 10/24/23: small sips strategy - given mod-max v/v cues, pt completed during 8/10 trials; pt coughed during approx 25% of trials during small sip use toward the end of trials - possibly r/t fatigue. Education provided re coughing is not always a sign of aspiration - could be laryngeal sensitivity or penetration; is a positive sign of possible ejection; etc. Discussed though that reducing coughing is still a goal regardless. 10/24/23: neutral/upright positioning pt achieved 50% of neutral positioning during 4/6 trials given mod/max cues. Pt was reaching for the cup vs. Bringing the cup to the mouth. It is possible that poor positioning is leading to coughing following swallows. 11/02/23: addressed posture today with use of small sips via open cup (thin liquids) with tactile cue and written cue (pt instructed to keep his back against the chair and bring the cup to his mouth (was attempting to lean down toward the cup). Pt used the strategy with consistent tactile cues during 9/10 trials. 11/02/23: education provided both verbally and in written format re double swallow strategy - instructions provided to wife. Released cueing to pt's wife who demonstrated ability to cue him for double swallow strategy. Pt completed the strategy accurately during 8/10 trials given max cues from either clinician or his spouse. Dry immediate and delayed cough happening frequently with no significant pattern.  Discussed reasons for coughing could include reflux, laryngeal sensitivity, habit, secretion-related, etc.     Pt will complete respiratory strengthening exercises 3 sets of 10 to support intraoral pressure required for safe/effective swallowing as well as respiratory strength for protective mechanisms (cough) when given max v/v/t cues. Pt will use functional AAC board with written text or images to communicate basic wants/needs when provided with a direct prompt with field of options <10-12 given mod v/v/t cues. 10/17/23: pt responded to various verbal and written prompts during simple, structured conversation during 50% of attempts initially. He improved with gestures to look at mouth and read written information to support input. With repetition and increase in cues, he was able to respond accurately during 90% of attempts. Pt will follow 1-2 step directions with 75% accuracy when provided with a written prompt given mod v/v cues as needed. 11/02/23: followed 2 step directions accurately during 8/10 trials given max verbal and written cues. 6. Pt will participate in further low-tech AAC trials as needed in order to determine most appropriate set up for carryover. 7. Pt will provided a head gesture y/n response or point to a y/n image when provided with a written or verbal prompt with 75% accuracy given mod /v cues. 10/26/23: pt gestures yes and no to questions when asked during 80% of opportunities with repetitions of questions as needed  11/02/23: gestured y/n to respond to questions during 5/5 opportunities. 8. Pt will produce sounds in isolation accurately during 8/10 opportunities given demonstration and additional cues as needed. 10/24/23: focused on /l/, /sh/, and /k/    /l/ - increasing accuracy 6/10   /sh/ - 0/10 - pt with syllable addition (sh - oe) unable to correct  /k/  - 0/10 unable to understand directions to say /k in isolation despite max cues. 9. Pt will produce words in isolation accurately during 8/10 opportunities given demonstration and additional cues as needed. 10/24/23: produced functional/familiar words (words about self, family, names, address, etc.) - produced approx 75% of words in terms of articulatory clarity.    10/26/23: pt produced words in isolation when given a picture during 75% of opportunities in terms of intelligibility given max visual cues. 10/30/23: pt produced words in isolation accurately when asked a direct question during 90% of opportunities in terms of general intelligibility, demonstrating improvement. 10/30/23: pt produced words in short phrases (2-3 words) with clarity during 70% of opportunities in terms of general intelligibility across the phrase - demonstrating improvement   11/02/23: produced words in short phrases (2-3 words max) in response to a question during 90% of opportunities with fair-good intelligibility within the context of a pre-determined topic.        Frequency: 1-2x/week  Duration: 6-8 weeks     Intervention certification from: 76/47/0896  Intervention certification to: 82/2/5657     Intervention comments:   Spouse present for sessions      Plan    Pt was provided with HEP/Activities to support POC and Continue with POC  Consider updated goal for speech sound production for functional words/phrases  /k, sh, l/ sounds; cues for verbal response y/n/okay vs head nod to support use of verbal expression  HEP included /m/ and /b/

## 2023-11-07 ENCOUNTER — OFFICE VISIT (OUTPATIENT)
Dept: SPEECH THERAPY | Facility: CLINIC | Age: 83
End: 2023-11-07
Payer: MEDICARE

## 2023-11-07 DIAGNOSIS — R13.12 OROPHARYNGEAL DYSPHAGIA: ICD-10-CM

## 2023-11-07 DIAGNOSIS — R41.841 COGNITIVE COMMUNICATION DEFICIT: ICD-10-CM

## 2023-11-07 DIAGNOSIS — F80.9 SPEECH AND LANGUAGE DEFICITS: Primary | ICD-10-CM

## 2023-11-07 PROCEDURE — 92507 TX SP LANG VOICE COMM INDIV: CPT

## 2023-11-07 PROCEDURE — 92526 ORAL FUNCTION THERAPY: CPT

## 2023-11-07 NOTE — PROGRESS NOTES
Speech Treatment Note    Today's date: 2023  Patient’s name: Harris De Dios  : 1940  MRN: [id number]  Safety measures: SN hearing loss, fall risk   Referring provider: RONY Quiroz    Visit: # 7      Subjective/Behavioral    Pleasant and Cooperative    Looks to spouse, Edith Soto, to respond but improving during sessions - pt more readily answers questions for himself. Benefits from tactile cues to look at speaker as well as additional processing time. 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 Significant improvement today with increasing # of utterances and improved response time. Assessment (Treatment Goals)    Long-term goals:  Pt will improve safety and efficiency of swallowing the LRD when provided with restorative and compensatory strategies/exercises. Pt will improve cognitive communication via any communication modality for expressive and receptive input when provided with access to various options. Short-term goals:     Pt will complete oropharyngeal strengthening exercises (e.g., with IOPI) when given max v/v/t cues and biofeedback as able in order to improve efficiency of swallowing on LRD including mech/SBS and thin liquids. 10/17/23: performed anterior IOPI exercises 3 sets of 10 reps with increasing difficulty from 50%-70% of max (from max achieved today-25). Pt needed max v/v/t cues to understand the directions. 10/26/23: performed anterior IOPI exercises 3 sets of 10 at 80% of max achieved today (18). Improved direction following during today's session - less cues required. 10/30/23: IOPI trials: anterior max today = 16; reps completed at 80% of max - 4 sets of 10. Attempted to increase to 90% of max however pt unable to complete more than 2 reps at this pressure.    23: IOPI trials: anterior max today = 20; reps completed at 80%-90% of max - 5 sets of 10.  11/07/23: posterior max today = 19; reps completed at 60% of max - 3 sets of 10      Pt and CG will demonstrate understanding of compensatory swallow strategies including postural changes, bolus size, use of various adaptive equipment (may include use of straw vs open cup; provale cup), appropriate pacing, oral care, etc. during 80% of opportunities given min v/v cues. 10/24/23: small sips strategy - given mod-max v/v cues, pt completed during 8/10 trials; pt coughed during approx 25% of trials during small sip use toward the end of trials - possibly r/t fatigue. Education provided re coughing is not always a sign of aspiration - could be laryngeal sensitivity or penetration; is a positive sign of possible ejection; etc. Discussed though that reducing coughing is still a goal regardless. 10/24/23: neutral/upright positioning pt achieved 50% of neutral positioning during 4/6 trials given mod/max cues. Pt was reaching for the cup vs. Bringing the cup to the mouth. It is possible that poor positioning is leading to coughing following swallows. 11/02/23: addressed posture today with use of small sips via open cup (thin liquids) with tactile cue and written cue (pt instructed to keep his back against the chair and bring the cup to his mouth (was attempting to lean down toward the cup). Pt used the strategy with consistent tactile cues during 9/10 trials. 11/02/23: education provided both verbally and in written format re double swallow strategy - instructions provided to wife. Released cueing to pt's wife who demonstrated ability to cue him for double swallow strategy. Pt completed the strategy accurately during 8/10 trials given max cues from either clinician or his spouse. Dry immediate and delayed cough happening frequently with no significant pattern.  Discussed reasons for coughing could include reflux, laryngeal sensitivity, habit, secretion-related, etc.     Pt will complete respiratory strengthening exercises 3 sets of 10 to support intraoral pressure required for safe/effective swallowing as well as respiratory strength for protective mechanisms (cough) when given max v/v/t cues. Pt will use functional AAC board with written text or images to communicate basic wants/needs when provided with a direct prompt with field of options <10-12 given mod v/v/t cues. 10/17/23: pt responded to various verbal and written prompts during simple, structured conversation during 50% of attempts initially. He improved with gestures to look at mouth and read written information to support input. With repetition and increase in cues, he was able to respond accurately during 90% of attempts. Pt will follow 1-2 step directions with 75% accuracy when provided with a written prompt given mod v/v cues as needed. 11/02/23: followed 2 step directions accurately during 8/10 trials given max verbal and written cues. 6. Pt will participate in further low-tech AAC trials as needed in order to determine most appropriate set up for carryover. 7. Pt will provided a head gesture y/n response or point to a y/n image when provided with a written or verbal prompt with 75% accuracy given mod /v cues. 10/26/23: pt gestures yes and no to questions when asked during 80% of opportunities with repetitions of questions as needed  11/02/23: gestured y/n to respond to questions during 5/5 opportunities. 8. Pt will produce sounds in isolation accurately during 8/10 opportunities given demonstration and additional cues as needed. 10/24/23: focused on /l/, /sh/, and /k/    /l/ - increasing accuracy 6/10   /sh/ - 0/10 - pt with syllable addition (sh - oe) unable to correct  /k/  - 0/10 unable to understand directions to say /k in isolation despite max cues. 9. Pt will produce words in isolation accurately during 8/10 opportunities given demonstration and additional cues as needed.    10/24/23: produced functional/familiar words (words about self, family, names, address, etc.) - produced approx 75% of words in terms of articulatory clarity. 10/26/23: pt produced words in isolation when given a picture during 75% of opportunities in terms of intelligibility given max visual cues. 10/30/23: pt produced words in isolation accurately when asked a direct question during 90% of opportunities in terms of general intelligibility, demonstrating improvement. 10/30/23: pt produced words in short phrases (2-3 words) with clarity during 70% of opportunities in terms of general intelligibility across the phrase - demonstrating improvement   11/02/23: produced words in short phrases (2-3 words max) in response to a question during 90% of opportunities with fair-good intelligibility within the context of a pre-determined topic.    11/07/23: using visual stimulus of single items, pt generated short sentences using slow pacing/ words - words produced with clarity during approx 80% of opportunities      Frequency: 1-2x/week  Duration: 6-8 weeks     Intervention certification from: 73/75/5464  Intervention certification to: 68/8/4795     Intervention comments:   Spouse present for sessions      Plan    Pt was provided with HEP/Activities to support POC and Continue with POC  Consider updated goal for speech sound production for functional words/phrases  /k, sh, l/ sounds; cues for verbal response y/n/okay vs head nod to support use of verbal expression  HEP included /m/ and /b/

## 2023-11-09 ENCOUNTER — OFFICE VISIT (OUTPATIENT)
Dept: SPEECH THERAPY | Facility: CLINIC | Age: 83
End: 2023-11-09
Payer: MEDICARE

## 2023-11-09 DIAGNOSIS — R41.841 COGNITIVE COMMUNICATION DEFICIT: ICD-10-CM

## 2023-11-09 DIAGNOSIS — F80.9 SPEECH AND LANGUAGE DEFICITS: Primary | ICD-10-CM

## 2023-11-09 DIAGNOSIS — R13.12 OROPHARYNGEAL DYSPHAGIA: ICD-10-CM

## 2023-11-09 PROCEDURE — 92507 TX SP LANG VOICE COMM INDIV: CPT

## 2023-11-09 PROCEDURE — 92526 ORAL FUNCTION THERAPY: CPT

## 2023-11-09 NOTE — PROGRESS NOTES
Speech Treatment Note    Today's date: 2023  Patient’s name: Ava Fitzgerald  : 1940  MRN: [id number]  Safety measures: SN hearing loss, fall risk   Referring provider: RONY Sanchez    Visit: # 8      Subjective/Behavioral    Pleasant and Cooperative    Looks to spouse, Rajani Lyons, to respond but improving during sessions - pt more readily answers questions for himself. Benefits from tactile cues to look at speaker as well as additional processing time. Assessment Notes & Comments    Auditory comprehension seems to be improving slightly, as does his overall intelligibility, though still mod-severely unintelligible beyond the word level. +oral and limb apraxia     Intervention/Treatment Goals    Long-term goals:  Pt will improve safety and efficiency of swallowing the LRD when provided with restorative and compensatory strategies/exercises. Pt will improve cognitive communication via any communication modality for expressive and receptive input when provided with access to various options. Short-term goals:     Pt will complete oropharyngeal strengthening exercises (e.g., with IOPI) when given max v/v/t cues and biofeedback as able in order to improve efficiency of swallowing on LRD including mech/SBS and thin liquids. 10/17/23: performed anterior IOPI exercises 3 sets of 10 reps with increasing difficulty from 50%-70% of max (from max achieved today-25). Pt needed max v/v/t cues to understand the directions. 10/26/23: performed anterior IOPI exercises 3 sets of 10 at 80% of max achieved today (18). Improved direction following during today's session - less cues required. 10/30/23: IOPI trials: anterior max today = 16; reps completed at 80% of max - 4 sets of 10. Attempted to increase to 90% of max however pt unable to complete more than 2 reps at this pressure.    23: IOPI trials: anterior max today = 20; reps completed at 80%-90% of max - 5 sets of 10.  23: posterior max today = 19; reps completed at 60% of max - 3 sets of 10   11/09/23: posterior max today = 17; reps completed at 70% of max 3 sets of 10      Pt and CG will demonstrate understanding of compensatory swallow strategies including postural changes, bolus size, use of various adaptive equipment (may include use of straw vs open cup; provale cup), appropriate pacing, oral care, etc. during 80% of opportunities given min v/v cues. 10/24/23: small sips strategy - given mod-max v/v cues, pt completed during 8/10 trials; pt coughed during approx 25% of trials during small sip use toward the end of trials - possibly r/t fatigue. Education provided re coughing is not always a sign of aspiration - could be laryngeal sensitivity or penetration; is a positive sign of possible ejection; etc. Discussed though that reducing coughing is still a goal regardless. 10/24/23: neutral/upright positioning pt achieved 50% of neutral positioning during 4/6 trials given mod/max cues. Pt was reaching for the cup vs. Bringing the cup to the mouth. It is possible that poor positioning is leading to coughing following swallows. 11/02/23: addressed posture today with use of small sips via open cup (thin liquids) with tactile cue and written cue (pt instructed to keep his back against the chair and bring the cup to his mouth (was attempting to lean down toward the cup). Pt used the strategy with consistent tactile cues during 9/10 trials. 11/02/23: education provided both verbally and in written format re double swallow strategy - instructions provided to wife. Released cueing to pt's wife who demonstrated ability to cue him for double swallow strategy. Pt completed the strategy accurately during 8/10 trials given max cues from either clinician or his spouse. Dry immediate and delayed cough happening frequently with no significant pattern.  Discussed reasons for coughing could include reflux, laryngeal sensitivity, habit, secretion-related, etc.   11/09/23: pt performed 10 small open cup sips with use of upright posture (back resting against chair; cup to mouth w/o leaning forward) and with 2x swallow strategy following sips given written and consistent verbal cues. Used all 3 strategies approx 2/3 of the time given max cues. Pt will complete respiratory strengthening exercises 3 sets of 10 to support intraoral pressure required for safe/effective swallowing as well as respiratory strength for protective mechanisms (cough) when given max v/v/t cues. Pt will use functional AAC board with written text or images to communicate basic wants/needs when provided with a direct prompt with field of options <10-12 given mod v/v/t cues. 10/17/23: pt responded to various verbal and written prompts during simple, structured conversation during 50% of attempts initially. He improved with gestures to look at mouth and read written information to support input. With repetition and increase in cues, he was able to respond accurately during 90% of attempts. Pt will follow 1-2 step directions with 75% accuracy when provided with a written prompt given mod v/v cues as needed. 11/02/23: followed 2 step directions accurately during 8/10 trials given max verbal and written cues. 11/09/23: followed single step directions 10/10 IND on first verbal presentation   11/09/23: followed 2 step directions 5/5 IND on first verbal presentation   11/09/23: followed 3 step directions 1/3 given mod-max cues on 2nd presentation. 6. Pt will participate in further low-tech AAC trials as needed in order to determine most appropriate set up for carryover. 7. Pt will provided a head gesture y/n response or point to a y/n image when provided with a written or verbal prompt with 75% accuracy given mod /v cues.    10/26/23: pt gestures yes and no to questions when asked during 80% of opportunities with repetitions of questions as needed  11/02/23: gestured y/n to respond to questions during 5/5 opportunities. 8. Pt will produce sounds in isolation accurately during 8/10 opportunities given demonstration and additional cues as needed. 10/24/23: focused on /l/, /sh/, and /k/    /l/ - increasing accuracy 6/10   /sh/ - 0/10 - pt with syllable addition (sh - oe) unable to correct  /k/  - 0/10 unable to understand directions to say /k in isolation despite max cues. 9. Pt will produce words in isolation accurately during 8/10 opportunities given demonstration and additional cues as needed. 10/24/23: produced functional/familiar words (words about self, family, names, address, etc.) - produced approx 75% of words in terms of articulatory clarity. 10/26/23: pt produced words in isolation when given a picture during 75% of opportunities in terms of intelligibility given max visual cues. 10/30/23: pt produced words in isolation accurately when asked a direct question during 90% of opportunities in terms of general intelligibility, demonstrating improvement. 10/30/23: pt produced words in short phrases (2-3 words) with clarity during 70% of opportunities in terms of general intelligibility across the phrase - demonstrating improvement   11/02/23: produced words in short phrases (2-3 words max) in response to a question during 90% of opportunities with fair-good intelligibility within the context of a pre-determined topic.    11/07/23: using visual stimulus of single items, pt generated short sentences using slow pacing/ words - words produced with clarity during approx 80% of opportunities      Frequency: 1-2x/week  Duration: 6-8 weeks     Intervention certification from: 03/75/5203  Intervention certification to: 31/9/7983     Intervention comments:   Spouse present for sessions    Plan    Pt was provided with HEP/Activities to support POC and Continue with POC  Consider updated goal for speech sound production for functional words/phrases  /k, sh, l/ sounds; cues for verbal response y/n/okay vs head nod to support use of verbal expression  HEP included /m/ and /b/

## 2023-11-10 ENCOUNTER — APPOINTMENT (EMERGENCY)
Dept: CT IMAGING | Facility: HOSPITAL | Age: 83
End: 2023-11-10
Payer: MEDICARE

## 2023-11-10 ENCOUNTER — HOSPITAL ENCOUNTER (EMERGENCY)
Facility: HOSPITAL | Age: 83
Discharge: HOME | End: 2023-11-10
Attending: EMERGENCY MEDICINE
Payer: MEDICARE

## 2023-11-10 ENCOUNTER — APPOINTMENT (EMERGENCY)
Dept: RADIOLOGY | Facility: HOSPITAL | Age: 83
End: 2023-11-10
Payer: MEDICARE

## 2023-11-10 VITALS
DIASTOLIC BLOOD PRESSURE: 75 MMHG | OXYGEN SATURATION: 94 % | RESPIRATION RATE: 17 BRPM | TEMPERATURE: 97.8 F | HEART RATE: 97 BPM | SYSTOLIC BLOOD PRESSURE: 134 MMHG

## 2023-11-10 DIAGNOSIS — I62.03 CHRONIC SUBDURAL HEMATOMA (HCC): Primary | ICD-10-CM

## 2023-11-10 DIAGNOSIS — S02.2XXA NASAL BONE FRACTURE: ICD-10-CM

## 2023-11-10 DIAGNOSIS — S01.81XA FACIAL LACERATION, INITIAL ENCOUNTER: ICD-10-CM

## 2023-11-10 LAB
ALBUMIN SERPL BCP-MCNC: 4.1 G/DL (ref 3.5–5)
ALP SERPL-CCNC: 90 U/L (ref 34–104)
ALT SERPL W P-5'-P-CCNC: 15 U/L (ref 7–52)
ANION GAP SERPL CALCULATED.3IONS-SCNC: 6 MMOL/L
AST SERPL W P-5'-P-CCNC: 17 U/L (ref 13–39)
BASOPHILS # BLD AUTO: 0.02 THOUSANDS/ÂΜL (ref 0–0.1)
BASOPHILS NFR BLD AUTO: 0 % (ref 0–1)
BILIRUB SERPL-MCNC: 0.46 MG/DL (ref 0.2–1)
BUN SERPL-MCNC: 18 MG/DL (ref 5–25)
CALCIUM SERPL-MCNC: 9 MG/DL (ref 8.4–10.2)
CHLORIDE SERPL-SCNC: 100 MMOL/L (ref 96–108)
CO2 SERPL-SCNC: 29 MMOL/L (ref 21–32)
CREAT SERPL-MCNC: 0.67 MG/DL (ref 0.6–1.3)
EOSINOPHIL # BLD AUTO: 0.03 THOUSAND/ÂΜL (ref 0–0.61)
EOSINOPHIL NFR BLD AUTO: 0 % (ref 0–6)
ERYTHROCYTE [DISTWIDTH] IN BLOOD BY AUTOMATED COUNT: 13.2 % (ref 11.6–15.1)
GFR SERPL CREATININE-BSD FRML MDRD: 88 ML/MIN/1.73SQ M
GLUCOSE SERPL-MCNC: 95 MG/DL (ref 65–140)
HCT VFR BLD AUTO: 40.9 % (ref 36.5–49.3)
HGB BLD-MCNC: 13.4 G/DL (ref 12–17)
IMM GRANULOCYTES # BLD AUTO: 0.02 THOUSAND/UL (ref 0–0.2)
IMM GRANULOCYTES NFR BLD AUTO: 0 % (ref 0–2)
LYMPHOCYTES # BLD AUTO: 0.89 THOUSANDS/ÂΜL (ref 0.6–4.47)
LYMPHOCYTES NFR BLD AUTO: 11 % (ref 14–44)
MCH RBC QN AUTO: 31.6 PG (ref 26.8–34.3)
MCHC RBC AUTO-ENTMCNC: 32.8 G/DL (ref 31.4–37.4)
MCV RBC AUTO: 97 FL (ref 82–98)
MONOCYTES # BLD AUTO: 0.65 THOUSAND/ÂΜL (ref 0.17–1.22)
MONOCYTES NFR BLD AUTO: 8 % (ref 4–12)
NEUTROPHILS # BLD AUTO: 6.34 THOUSANDS/ÂΜL (ref 1.85–7.62)
NEUTS SEG NFR BLD AUTO: 81 % (ref 43–75)
NRBC BLD AUTO-RTO: 0 /100 WBCS
PLATELET # BLD AUTO: 420 THOUSANDS/UL (ref 149–390)
PMV BLD AUTO: 8.8 FL (ref 8.9–12.7)
POTASSIUM SERPL-SCNC: 3.9 MMOL/L (ref 3.5–5.3)
PROT SERPL-MCNC: 6.9 G/DL (ref 6.4–8.4)
RBC # BLD AUTO: 4.24 MILLION/UL (ref 3.88–5.62)
SODIUM SERPL-SCNC: 135 MMOL/L (ref 135–147)
WBC # BLD AUTO: 7.95 THOUSAND/UL (ref 4.31–10.16)

## 2023-11-10 PROCEDURE — 99447 NTRPROF PH1/NTRNET/EHR 11-20: CPT | Performed by: STUDENT IN AN ORGANIZED HEALTH CARE EDUCATION/TRAINING PROGRAM

## 2023-11-10 PROCEDURE — 99285 EMERGENCY DEPT VISIT HI MDM: CPT | Performed by: EMERGENCY MEDICINE

## 2023-11-10 PROCEDURE — 85025 COMPLETE CBC W/AUTO DIFF WBC: CPT | Performed by: EMERGENCY MEDICINE

## 2023-11-10 PROCEDURE — NC001 PR NO CHARGE: Performed by: EMERGENCY MEDICINE

## 2023-11-10 PROCEDURE — 70450 CT HEAD/BRAIN W/O DYE: CPT

## 2023-11-10 PROCEDURE — 80053 COMPREHEN METABOLIC PANEL: CPT | Performed by: EMERGENCY MEDICINE

## 2023-11-10 PROCEDURE — G1004 CDSM NDSC: HCPCS

## 2023-11-10 PROCEDURE — 12015 RPR F/E/E/N/L/M 7.6-12.5 CM: CPT

## 2023-11-10 PROCEDURE — 71045 X-RAY EXAM CHEST 1 VIEW: CPT

## 2023-11-10 PROCEDURE — 70486 CT MAXILLOFACIAL W/O DYE: CPT

## 2023-11-10 PROCEDURE — 36415 COLL VENOUS BLD VENIPUNCTURE: CPT | Performed by: EMERGENCY MEDICINE

## 2023-11-10 PROCEDURE — 72125 CT NECK SPINE W/O DYE: CPT

## 2023-11-10 PROCEDURE — 99284 EMERGENCY DEPT VISIT MOD MDM: CPT

## 2023-11-10 RX ORDER — LIDOCAINE HYDROCHLORIDE AND EPINEPHRINE 10; 10 MG/ML; UG/ML
10 INJECTION, SOLUTION INFILTRATION; PERINEURAL ONCE
Status: COMPLETED | OUTPATIENT
Start: 2023-11-10 | End: 2023-11-10

## 2023-11-10 RX ORDER — GINSENG 100 MG
1 CAPSULE ORAL ONCE
Status: COMPLETED | OUTPATIENT
Start: 2023-11-10 | End: 2023-11-10

## 2023-11-10 RX ADMIN — LIDOCAINE HYDROCHLORIDE,EPINEPHRINE BITARTRATE 10 ML: 10; .01 INJECTION, SOLUTION INFILTRATION; PERINEURAL at 16:20

## 2023-11-10 RX ADMIN — BACITRACIN 1 SMALL APPLICATION: 500 OINTMENT TOPICAL at 18:25

## 2023-11-10 NOTE — ED PROVIDER NOTES
Emergency Department Trauma Note  Fernandez Petit 79 y.o. male MRN: [id number]  Unit/Bed#: ED 17/ED 17 Encounter: [id number]      Trauma Alert: Trauma Acuity: Trauma Evaluation  Model of Arrival: Mode of Arrival: Direct from scene via    Trauma Team: Current Providers  Attending Provider: Tova Newsome MD  Registered Nurse: Davina Martinez, RN  Physician Assistant: Catrina Cage PA-C  Consulting Physician: Loree Sorensen DO  Consultants:     None      History of Present Illness     Chief Complaint:   Chief Complaint   Patient presents with    Terrea Dk while walking outside. Laceration above left eye. No anticoagulants. No LOC     HPI:  Fernandez Petit is a 79 y.o. male who presents with   Mechanism:Details of Incident: fall no thinners no loc. headstrike and laceration to left upper eyebrow. mechanical fall Injury Date: 11/10/23        Patient is a 79year old male with past medical history of a subdural hematoma with craniotomy in May 2022 presenting s/p fall at home. Pt is deaf and does not have his cochlear implant in. Wife reports her  walked into their house with severe bleeding from his forehead, just above his left eyebrow. Wife reports pt told her that he tripped and fell in the grass at their house. Wife bandaged his forehead and nose and brought him to the ER. Pt denies being dizzy before the fall. Wife served as . Pt reports his forehead hurts. Unable to rate or describe the pain. Pt denies dizziness, chest pain, sob, or abd pain. ROS is limited due to patient's hearing difficulty.        History provided by:  Spouse  Fall  Mechanism of injury: fall    Injury location:  Head/neck  Head/neck injury location:  Head  Incident location:  Home  Time since incident:  30 minutes  Arrived directly from scene: yes    Fall:     Fall occurred:  Standing    Impact surface:  Grass    Point of impact:  Head    Entrapped after fall: no    Protective equipment: none    Suspicion of alcohol use: no    Suspicion of drug use: no    Associated symptoms: no abdominal pain, no back pain, no chest pain, no loss of consciousness, no neck pain, no seizures and no vomiting      ROS limited by patient being deaf. Historical Information     Immunizations:   Immunization History   Administered Date(s) Administered    COVID-19 MODERNA VACC 0.25 ML IM BOOSTER 11/01/2021    COVID-19 MODERNA VACC 0.5 ML IM 01/19/2021, 02/17/2021    INFLUENZA 10/11/2017, 09/05/2019, 10/26/2022    Influenza, high dose seasonal 0.7 mL 09/20/2018, 10/12/2020, 12/06/2021, 10/26/2022, 10/27/2023    Pneumococcal 11/12/2012, 01/06/2017    Pneumococcal Conjugate 13-Valent 06/12/2020    Pneumococcal Polysaccharide PPV23 07/08/2021    Tdap 04/08/2021       Past Medical History:   Diagnosis Date    Arthritis     Encounter for general adult medical examination without abnormal findings 03/20/2019    Fall 11/03/2022    Hyperlipidemia     Macular degeneration, 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 No pertinent family history.   Past Surgical History:   Procedure Laterality Date    BRAIN HEMATOMA EVACUATION Left 05/28/2022    Procedure: left CRANIOTOMY FOR SUBDURAL HEMATOMA;  Surgeon: Raciel White MD;  Location: [facility];  Service: Neurosurgery    CARPAL TUNNEL RELEASE      HERNIA REPAIR Right     inguinal    IR CEREBRAL ANGIOGRAPHY / INTERVENTION  06/02/2022    RI COCHLEAR DEVICE IMPLANTATION W/WO MASTOIDECTOMY Left 1/17/2023    Procedure: LEFT COCHLEAR IMPLANTATION WITH MASTOIDECTOMY AND FACIAL NERVE MONITORING WITH AUDIOMETRIC TESTING OF THE IMPLANT;  Surgeon: Po Ortez MD;  Location: [facility];  Service: ENT    RI NEUROPLASTY &/TRANSPOS MEDIAN NRV CARPAL Marisol Heck Right 09/20/2021    Procedure: RELEASE CARPAL TUNNEL;  Surgeon: Fanny Giron MD;  Location: [facility];  Service: Orthopedics     Social History     Tobacco Use    Smoking status: Former    Smokeless tobacco: Never    Tobacco comments:     Smoked as a teenager   Vaping Use    Vaping Use: Never used   Substance Use Topics    Alcohol use: Yes     Alcohol/week: 3.0 standard drinks of alcohol     Types: 3 Cans of beer per week    Drug use: No     E-Cigarette/Vaping    E-Cigarette Use Never User      E-Cigarette/Vaping Substances       Family History: non-contributory    Meds/Allergies   Prior to Admission Medications   Prescriptions Last Dose Informant Patient Reported? Taking? Multiple Vitamin (MULTIVITAMIN) tablet  Spouse/Significant Other Yes No   Sig: Take by mouth daily    Multiple Vitamins-Minerals (PRESERVISION AREDS 2 PO)  Spouse/Significant Other Yes No   Sig: Take by mouth   acetaminophen (TYLENOL) 325 mg tablet  Spouse/Significant Other No No   Sig: Take 2 tablets (650 mg total) by mouth every 6 (six) hours as needed for mild pain   loratadine (CLARITIN) 10 mg tablet   No No   Sig: Take 1 tablet (10 mg total) by mouth daily   simvastatin (ZOCOR) 20 mg tablet  Spouse/Significant Other No No   Sig: Take 1 tablet (20 mg total) by mouth daily at bedtime      Facility-Administered Medications: None       No Known Allergies    PHYSICAL EXAM    PE limited by: pt being deaf. Objective   Vitals:   First set: Temperature: 97.8 °F (36.6 °C) (11/10/23 1551)  Pulse: 91 (11/10/23 1552)  Respirations: 20 (11/10/23 1551)  Blood Pressure: 148/83 (11/10/23 1552)  SpO2: 96 % (11/10/23 1552)    Primary Survey:   (A) Airway: intact, no bleeding, pt speaking  (B) Breathing: CTA b/l, equal chest rise and fall  (C) Circulation: Pulses:   normal  (D) Disabliity:  Eye Opening:   Spontaneous = 4, Motor Response: Obeys commands = 6, and Verbal Response:  Oriented = 5  (E) Expose:  Completed    Secondary Survey: (Click on Physical Exam tab above)  Physical Exam  Vitals and nursing note reviewed. Constitutional:       General: He is not in acute distress. Appearance: He is well-developed. HENT:      Head: Normocephalic and atraumatic. Comments: 3" laceration left lower forehead above left eyebrow. 0.5" laceration left upper forehead. Refer to media tab. Eyes:      Conjunctiva/sclera: Conjunctivae normal.   Cardiovascular:      Rate and Rhythm: Normal rate and regular rhythm. Heart sounds: No murmur heard. Pulmonary:      Effort: Pulmonary effort is normal. No respiratory distress. Breath sounds: Normal breath sounds. Abdominal:      Palpations: Abdomen is soft. Tenderness: There is no abdominal tenderness. Musculoskeletal:         General: No swelling. Cervical back: Neck supple. Skin:     General: Skin is warm and dry. Capillary Refill: Capillary refill takes less than 2 seconds. Neurological:      Mental Status: He is alert. Psychiatric:         Mood and Affect: Mood normal.         Cervical spine cleared by clinical criteria?  Yes     Invasive Devices       None                   Lab Results:   Results Reviewed       Procedure Component Value Units Date/Time    Comprehensive metabolic panel [[id number]] Collected: 11/10/23 1621    Lab Status: Final result Specimen: Blood from Arm, Left Updated: 11/10/23 1649     Sodium 135 mmol/L      Potassium 3.9 mmol/L      Chloride 100 mmol/L      CO2 29 mmol/L      ANION GAP 6 mmol/L      BUN 18 mg/dL      Creatinine 0.67 mg/dL      Glucose 95 mg/dL      Calcium 9.0 mg/dL      AST 17 U/L      ALT 15 U/L      Alkaline Phosphatase 90 U/L      Total Protein 6.9 g/dL      Albumin 4.1 g/dL      Total Bilirubin 0.46 mg/dL      eGFR 88 ml/min/1.73sq m     Narrative:      [facility] guidelines for Chronic Kidney Disease (CKD):     Stage 1 with normal or high GFR (GFR > 90 mL/min/1.73 square meters)    Stage 2 Mild CKD (GFR = 60-89 mL/min/1.73 square meters)    Stage 3A Moderate CKD (GFR = 45-59 mL/min/1.73 square meters)    Stage 3B Moderate CKD (GFR = 30-44 mL/min/1.73 square meters)    Stage 4 Severe CKD (GFR = 15-29 mL/min/1.73 square meters)    Stage 5 End Stage CKD (GFR <15 mL/min/1.73 square meters)  Note: GFR calculation is accurate only with a steady state creatinine    CBC and differential [[id number]]  (Abnormal) Collected: 11/10/23 1621    Lab Status: Final result Specimen: Blood from Arm, Left Updated: 11/10/23 1627     WBC 7.95 Thousand/uL      RBC 4.24 Million/uL      Hemoglobin 13.4 g/dL      Hematocrit 40.9 %      MCV 97 fL      MCH 31.6 pg      MCHC 32.8 g/dL      RDW 13.2 %      MPV 8.8 fL      Platelets 311 Thousands/uL      nRBC 0 /100 WBCs      Neutrophils Relative 81 %      Immat GRANS % 0 %      Lymphocytes Relative 11 %      Monocytes Relative 8 %      Eosinophils Relative 0 %      Basophils Relative 0 %      Neutrophils Absolute 6.34 Thousands/µL      Immature Grans Absolute 0.02 Thousand/uL      Lymphocytes Absolute 0.89 Thousands/µL      Monocytes Absolute 0.65 Thousand/µL      Eosinophils Absolute 0.03 Thousand/µL      Basophils Absolute 0.02 Thousands/µL                    Imaging Studies:   Direct to CT: No  TRAUMA - CT spine cervical wo contrast   Final Result by Jemima Mariscal MD (11/10 1651)      No acute cervical spine fracture or traumatic malalignment. Workstation performed: [id number]         TRAUMA - CT head wo contrast   Final Result by Jemima Mariscal MD (11/10 1701)         1. Mild left anterior frontal scalp laceration and hematoma. No fracture. 2. Small likely chronic residual left anterior frontal 4 mm subdural hematoma with minimal associated mass effect. Possibility of superimposed mild acute component not excludable. Consider short-term interim follow-up. No intraparenchymal hemorrhage    identified. 3. Chronic microangiopathic change again noted. The study was marked in [facility] for immediate notification. Workstation performed: [id number]         XR Trauma chest portable   Final Result by Lv Gaspar MD (11/10 1626)      No acute pulmonary pathology.       No obvious displaced fractures within limitation of supine AP chest technique. Workstation performed: [id number]               Procedures  Universal Protocol:  Consent: Verbal consent obtained. Risks and benefits: risks, benefits and alternatives were discussed  Consent given by: patient  Timeout called at: 11/10/2023 5:00 PM.  Patient understanding: patient states understanding of the procedure being performed  Patient consent: the patient's understanding of the procedure matches consent given  Procedure consent: procedure consent matches procedure scheduled  Relevant documents: relevant documents present and verified  Test results: test results available and properly labeled  Site marked: the operative site was marked  Radiology Images displayed and confirmed. If images not available, report reviewed: imaging studies available  Patient identity confirmed: verbally with patient  Laceration repair    Date/Time: 11/10/2023 5:00 PM    Performed by: Kednall Leonard PA-C  Authorized by: Kendall Leonard PA-C  Body area: head/neck  Location details: forehead  Laceration length: 8 cm  Foreign bodies: no foreign bodies  Tendon involvement: none  Nerve involvement: none    Anesthesia:  Local Anesthetic: lidocaine 1% with epinephrine    Sedation:  Patient sedated: no      Wound Dehiscence:  Superficial Wound Dehiscence: simple closure      Procedure Details:  Irrigation solution: saline  Irrigation method: syringe  Amount of cleaning: standard  Skin closure: 5-0 nylon  Number of sutures: 7  Technique: simple  Approximation: close  Approximation difficulty: simple  Dressing: 4x4 sterile gauze and antibiotic ointment  Patient tolerance: patient tolerated the procedure well with no immediate complications  Comments: L lower forehead      Universal Protocol:  Consent: Verbal consent obtained.   Risks and benefits: risks, benefits and alternatives were discussed  Consent given by: patient  Timeout called at: 11/10/2023 5:50 PM.  Patient understanding: patient states understanding of the procedure being performed  Patient consent: the patient's understanding of the procedure matches consent given  Procedure consent: procedure consent matches procedure scheduled  Relevant documents: relevant documents present and verified  Test results: test results available and properly labeled  Site marked: the operative site was marked  Radiology Images displayed and confirmed. If images not available, report reviewed: imaging studies available  Patient identity confirmed: verbally with patient  Laceration repair    Date/Time: 11/10/2023 5:50 PM    Performed by: Trini López PA-C  Authorized by: Trini López PA-C  Body area: head/neck  Location details: forehead  Laceration length: 2 cm    Anesthesia:  Local Anesthetic: lidocaine 1% with epinephrine    Sedation:  Patient sedated: no      Wound Dehiscence:  Superficial Wound Dehiscence: simple closure      Procedure Details:  Irrigation solution: saline  Skin closure: 5-0 nylon  Number of sutures: 1  Dressing: 4x4 sterile gauze  Comments: L upper forehead             ED Course  ED Course as of 11/10/23 1843   Fri Nov 10, 2023   1630 Bleeding controlled. Waiting for CXR and CT scan. [address] [place] [address]. Medical Decision Making  Patient is a 79year old male with unwitnessed fall at home. Wife reports pt walked into their house s/p fall in the grass. Pt has lacerations to his left upper and lower forehead. Pt is A&Ox4. Denies LOC. Ddx: facial fractures, brain hematoma   Presentation not consistent with acute intracranial bleed. Labs: CBC and CMP unremarkable. Imaging: CT head, CT spine cervical, CXR. CXR: No acute pulmonary pathology. No obvious displaced fractures within limitation of supine AP chest technique. CT head: 1. Mild left anterior frontal scalp laceration and hematoma. No fracture.  2. Small likely chronic residual left anterior frontal 4 mm subdural hematoma with minimal associated mass effect. Possibility of superimposed mild acute component not excludable. Consider short-term interim follow-up. No intraparenchymal hemorrhage identified. 3. Chronic microangiopathic change again noted. Dr. Juan Carlos Akhtar spoke with neurology and was advised to repeat head CT in 4 hours. Sutured laceration left forehead. Dispo: pt signed out to Dr. Juan Carlos Akhtar. Amount and/or Complexity of Data Reviewed  Independent Historian: spouse  External Data Reviewed: notes. Labs: ordered. Radiology: ordered. Risk  OTC drugs. Prescription drug management. Disposition  [facility] Transfer: No  Final diagnoses:   Chronic subdural hematoma ([address])     Time reflects when diagnosis was documented in both MDM as applicable and the Disposition within this note       Time User Action Codes Description Comment    11/10/2023  5:47 PM Mane Lui Add [I62.03] Chronic subdural hematoma [facility])           ED Disposition       None          Follow-up Information    None       Patient's Medications   Discharge Prescriptions    No medications on file     No discharge procedures on file.     PDMP Review         Value Time User    PDMP Reviewed  Yes 1/17/2023  7:37 AM Juan José Galeano MD            ED Provider  Electronically Signed by           Pura Liriano PA-C  11/10/23 1920

## 2023-11-10 NOTE — ED PROVIDER NOTES
Emergency Department Trauma Note  Lyn Scheuermann 79 y.o. male MRN: [id number]  Unit/Bed#: ED 17/ED 17 Encounter: [id number]      Trauma Alert: Trauma Acuity: Trauma Evaluation  Model of Arrival: Mode of Arrival: Direct from scene via    Trauma Team: Current Providers  Attending Provider: Dolly Mcdonald MD  Registered Nurse: Jean Link, RN  Physician Assistant: Kareem Lemus PA-C  Consulting Physician: Hugo Villanueva DO  Consultants:     None      History of Present Illness     Chief Complaint:   Chief Complaint   Patient presents with    [facility] while walking outside. Laceration above left eye. No anticoagulants. No LOC     HPI:  Lyn Scheuermann is a 79 y.o. male who presents with . Mechanism:Details of Incident: fall no thinners no loc. headstrike and laceration to left upper eyebrow. mechanical fall Injury Date: 11/10/23        Patient is an 25-year-old male with history of subdural hematoma, deafness that presents for evaluation after a fall. Patient is deaf and has difficulty communicating so his wife provides much of the history. He had a mechanical fall outside on the grass just prior to ED arrival.  He denies any loss conscious. Patient with obvious forehead laceration and head trauma. No antiplatelets or anticoagulation. Wife confirms the patient is otherwise at baseline and has been ambulatory. He does not take anything for his symptoms.       Review of Systems   Unable to perform ROS: Patient nonverbal       Historical Information     Immunizations:   Immunization History   Administered Date(s) Administered    COVID-19 MODERNA VACC 0.25 ML IM BOOSTER 11/01/2021    COVID-19 MODERNA VACC 0.5 ML IM 01/19/2021, 02/17/2021    INFLUENZA 10/11/2017, 09/05/2019, 10/26/2022    Influenza, high dose seasonal 0.7 mL 09/20/2018, 10/12/2020, 12/06/2021, 10/26/2022, 10/27/2023    Pneumococcal 11/12/2012, 01/06/2017    Pneumococcal Conjugate 13-Valent 06/12/2020    Pneumococcal Polysaccharide PPV23 07/08/2021    Tdap 04/08/2021       Past Medical History:   Diagnosis Date    Arthritis     Encounter for general adult medical examination without abnormal findings 03/20/2019    Fall 11/03/2022    Hyperlipidemia     Macular degeneration, 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 No pertinent family history. Past Surgical History:   Procedure Laterality Date    BRAIN HEMATOMA EVACUATION Left 05/28/2022    Procedure: left CRANIOTOMY FOR SUBDURAL HEMATOMA;  Surgeon: Kyrie Marrero MD;  Location:  MAIN OR;  Service: Neurosurgery    CARPAL TUNNEL RELEASE      HERNIA REPAIR Right     inguinal    IR CEREBRAL ANGIOGRAPHY / INTERVENTION  06/02/2022    [place] COCHLEAR DEVICE IMPLANTATION W/WO MASTOIDECTOMY Left 1/17/2023    Procedure: LEFT COCHLEAR IMPLANTATION WITH MASTOIDECTOMY AND FACIAL NERVE MONITORING WITH AUDIOMETRIC TESTING OF THE IMPLANT;  Surgeon: Modesto Aguilar MD;  Location:  MAIN OR;  Service: ENT    [place] NEUROPLASTY &/TRANSPOS MEDIAN NRV CARPAL Daune Tyrell Right 09/20/2021    Procedure: RELEASE CARPAL TUNNEL;  Surgeon: Heena Watson MD;  Location: [facility];  Service: Orthopedics     Social History     Tobacco Use    Smoking status: Former    Smokeless tobacco: Never    Tobacco comments:     Smoked as a teenager   Vaping Use    Vaping Use: Never used   Substance Use Topics    Alcohol use: Yes     Alcohol/week: 3.0 standard drinks of alcohol     Types: 3 Cans of beer per week    Drug use: No     E-Cigarette/Vaping    E-Cigarette Use Never User      E-Cigarette/Vaping Substances       Family History: non-contributory    Meds/Allergies   Prior to Admission Medications   Prescriptions Last Dose Informant Patient Reported? Taking?    Multiple Vitamin (MULTIVITAMIN) tablet  Spouse/Significant Other Yes No   Sig: Take by mouth daily    Multiple Vitamins-Minerals (PRESERVISION AREDS 2 PO)  Spouse/Significant Other Yes No   Sig: Take by mouth   acetaminophen (TYLENOL) 325 mg tablet  Spouse/Significant Other No No   Sig: Take 2 tablets (650 mg total) by mouth every 6 (six) hours as needed for mild pain   loratadine (CLARITIN) 10 mg tablet   No No   Sig: Take 1 tablet (10 mg total) by mouth daily   simvastatin (ZOCOR) 20 mg tablet  Spouse/Significant Other No No   Sig: Take 1 tablet (20 mg total) by mouth daily at bedtime      Facility-Administered Medications: None       No Known Allergies    PHYSICAL EXAM    PE limited by: NA    Objective   Vitals:   First set: Temperature: 97.8 °F (36.6 °C) (11/10/23 1551)  Pulse: 91 (11/10/23 1552)  Respirations: 20 (11/10/23 1551)  Blood Pressure: 148/83 (11/10/23 1552)  SpO2: 96 % (11/10/23 1552)    Primary Survey:   (A) Airway: Intact  (B) Breathing: Bilateral breath sounds  (C) Circulation: Pulses:   normal  (D) Disabliity:  GCS Total:  11, Eye Opening:   Spontaneous = 4, Motor Response: Obeys commands = 6, and Verbal Response:  None = 1  (E) Expose:  Completed    Secondary Survey: (Click on Physical Exam tab above)  Physical Exam  Vitals reviewed. Constitutional:       General: He is not in acute distress. Appearance: He is well-developed. Comments: Patient nonverbal   HENT:      Head: Normocephalic. Comments: Left forehead laceration and hematoma  Eyes:      Pupils: Pupils are equal, round, and reactive to light. Cardiovascular:      Rate and Rhythm: Normal rate and regular rhythm. Heart sounds: Normal heart sounds. No murmur heard. No friction rub. No gallop. Pulmonary:      Effort: Pulmonary effort is normal.      Breath sounds: Normal breath sounds. Abdominal:      General: Bowel sounds are normal. There is no distension. Palpations: Abdomen is soft. Tenderness: There is no abdominal tenderness. There is no guarding. Musculoskeletal:         General: Normal range of motion. Cervical back: Normal range of motion and neck supple.    Skin:     Capillary Refill: Capillary refill takes less than 2 seconds. Neurological:      Mental Status: He is alert. Cranial Nerves: No cranial nerve deficit. Sensory: No sensory deficit. Motor: No abnormal muscle tone. Comments: Spontaneously moving all 4 extremities. Sensation intact throughout   Psychiatric:         Behavior: Behavior normal.         Thought Content: Thought content normal.         Judgment: Judgment normal.         Cervical spine cleared by clinical criteria?  No (imaging required)      Invasive Devices       None                   Lab Results:   Results Reviewed       Procedure Component Value Units Date/Time    Comprehensive metabolic panel [[id number]] Collected: 11/10/23 1621    Lab Status: Final result Specimen: Blood from Arm, Left Updated: 11/10/23 1645     Sodium 135 mmol/L      Potassium 3.9 mmol/L      Chloride 100 mmol/L      CO2 29 mmol/L      ANION GAP 6 mmol/L      BUN 18 mg/dL      Creatinine 0.67 mg/dL      Glucose 95 mg/dL      Calcium 9.0 mg/dL      AST 17 U/L      ALT 15 U/L      Alkaline Phosphatase 90 U/L      Total Protein 6.9 g/dL      Albumin 4.1 g/dL      Total Bilirubin 0.46 mg/dL      eGFR 88 ml/min/1.73sq m     Narrative:      [place] guidelines for Chronic Kidney Disease (CKD):     Stage 1 with normal or high GFR (GFR > 90 mL/min/1.73 square meters)    Stage 2 Mild CKD (GFR = 60-89 mL/min/1.73 square meters)    Stage 3A Moderate CKD (GFR = 45-59 mL/min/1.73 square meters)    Stage 3B Moderate CKD (GFR = 30-44 mL/min/1.73 square meters)    Stage 4 Severe CKD (GFR = 15-29 mL/min/1.73 square meters)    Stage 5 End Stage CKD (GFR <15 mL/min/1.73 square meters)  Note: GFR calculation is accurate only with a steady state creatinine    CBC and differential [[id number]]  (Abnormal) Collected: 11/10/23 1621    Lab Status: Final result Specimen: Blood from Arm, Left Updated: 11/10/23 1627     WBC 7.95 Thousand/uL      RBC 4.24 Million/uL      Hemoglobin 13.4 g/dL      Hematocrit 40.9 %      MCV 97 fL      MCH 31.6 pg      MCHC 32.8 g/dL      RDW 13.2 %      MPV 8.8 fL      Platelets 422 Thousands/uL      nRBC 0 /100 WBCs      Neutrophils Relative 81 %      Immat GRANS % 0 %      Lymphocytes Relative 11 %      Monocytes Relative 8 %      Eosinophils Relative 0 %      Basophils Relative 0 %      Neutrophils Absolute 6.34 Thousands/µL      Immature Grans Absolute 0.02 Thousand/uL      Lymphocytes Absolute 0.89 Thousands/µL      Monocytes Absolute 0.65 Thousand/µL      Eosinophils Absolute 0.03 Thousand/µL      Basophils Absolute 0.02 Thousands/µL                    Imaging Studies:   Direct to CT: No  TRAUMA - CT spine cervical wo contrast   Final Result by Ester Lee MD (11/10 1651)      No acute cervical spine fracture or traumatic malalignment. Workstation performed: [id number]         TRAUMA - CT head wo contrast   Final Result by Ester Lee MD (11/10 1701)         1. Mild left anterior frontal scalp laceration and hematoma. No fracture. 2. Small likely chronic residual left anterior frontal 4 mm subdural hematoma with minimal associated mass effect. Possibility of superimposed mild acute component not excludable. Consider short-term interim follow-up. No intraparenchymal hemorrhage    identified. 3. Chronic microangiopathic change again noted. The study was marked in [facility] for immediate notification. Workstation performed: [id number]         XR Trauma chest portable   Final Result by Bekah Christianson MD (11/10 1626)      No acute pulmonary pathology. No obvious displaced fractures within limitation of supine AP chest technique.                Workstation performed: [id number]               Procedures  Procedures         ED Course  ED Course as of 11/10/23 1806   Fri Nov 10, 2023   1748 Plan to re-scan at 9 pm and dispo accordingly             Medical Decision Making  Patient is an 24-year-old male who presents for evaluation after fall with head trauma. May trauma evaluation secondary to the facial trauma along with history of subdural hematoma. Initial CT read as chronic subdural with no evidence of acute component. Discussed with trauma surgery and agreed for repeat CT scan in 4 hours. Repeat scan is stable. Blood work reviewed and unremarkable. Patient is otherwise at his baseline. Bilateral nasal bone fractures noted, ENT follow-up given. Discussed the case both with the patient's wife and his daughter who are in agreement the plan. PA repaired laceration. Amount and/or Complexity of Data Reviewed  Independent Historian: spouse  Labs: ordered. Radiology: ordered. Risk  OTC drugs. Prescription drug management. Disposition  [facility] Transfer: No  Final diagnoses:   Chronic subdural hematoma ([address])     Time reflects when diagnosis was documented in both MDM as applicable and the Disposition within this note       Time User Action Codes Description Comment    11/10/2023  5:47 PM Milena Lui Add [I62.03] Chronic subdural hematoma [facility])           ED Disposition       None          Follow-up Information    None       Patient's Medications   Discharge Prescriptions    No medications on file     No discharge procedures on file.     PDMP Review         Value Time User    PDMP Reviewed  Yes 1/17/2023  7:37 AM Yuriy Mckeon MD            ED Provider  Electronically Signed by           Rosa Fischer MD  11/12/23 5278

## 2023-11-10 NOTE — CONSULTS
E-Consult  Re[facility] 79 y.o. male MRN: [id number]  Encounter Date: 11/10/23      Reason for Consult / Principal Problem: Fall, possible acute on chronic SDH    Consulting Provider: Dr. Gerson Bassett DO    Requesting Provider: Dr. Deion Page MD    79 yr old male who presented to the ED after a fall. Per report patient has a history of a left craniotomy and MMA embolization 5/22 for a history of a SDH. Patient is deaf at baseline. He is not on any AC. Per report patient sustained a laceration to his left forehead. His GCS is WNLs. On chart review, his last CT head was performed 11/3/22 which demonstrated a SDH. A CT head and spine were performed which read as a mild anterior frontal scalp laceration and hematoma. Small, likely chronic residual left anterior frontal subdural hematoma. Possibility of superimposed mild acute component not excluded. No intraparenchymal hemorrhage identified. Consider short-term interim follow up. Labs reviewed and are WNLs      ASSESSMENT:  79 yr old male with history of a left craniotomy and MMA embolization 5/22 with residual SDH on his CT imaging 11/22 presenting after a mechanical fall  -Forehead laceration   -CT with chronic SDH, inability to exclude acute component    RECOMMENDATIONS:  -Will plan to perform short interval CT head in 4 hours, continue neuro checks. If repeat CT imaging demonstrates an acute component will plan for transfer to [facility]. -Trauma will continue to remain available, plan discussed with Dr. Kingsley Hong    Total time spent 11-20 minutes, >50% of the total time devoted to medical consultative verbal/EMR discussion between providers. Written report will be generated in the EMR. Gina Pryor DO

## 2023-11-11 NOTE — DISCHARGE INSTRUCTIONS
-Please have sutures removed in 1 week. Return with any evidence of infection including redness swelling or drainage    -CAT scan shows a chronic fluid collection on the exterior portion of the brain, repeat CAT scan showed that this was stable. After discussion with trauma surgery there is no need for transfer or hospitalization at this time.   Please watch for any changes in his mental status and if so please return to care    -His nose was found to be broken secondary to the fall, please follow-up with ENT if he has any breathing difficulties

## 2023-11-12 ENCOUNTER — HOSPITAL ENCOUNTER (EMERGENCY)
Facility: HOSPITAL | Age: 83
Discharge: HOME | End: 2023-11-12
Attending: EMERGENCY MEDICINE | Admitting: EMERGENCY MEDICINE
Payer: MEDICARE

## 2023-11-12 VITALS
RESPIRATION RATE: 18 BRPM | TEMPERATURE: 97.6 F | HEART RATE: 68 BPM | SYSTOLIC BLOOD PRESSURE: 145 MMHG | OXYGEN SATURATION: 96 % | DIASTOLIC BLOOD PRESSURE: 70 MMHG

## 2023-11-12 DIAGNOSIS — Z51.89 VISIT FOR WOUND CHECK: Primary | ICD-10-CM

## 2023-11-12 PROCEDURE — 99283 EMERGENCY DEPT VISIT LOW MDM: CPT | Performed by: PHYSICIAN ASSISTANT

## 2023-11-12 PROCEDURE — 99282 EMERGENCY DEPT VISIT SF MDM: CPT

## 2023-11-12 NOTE — ED PROVIDER NOTES
History  Chief Complaint   Patient presents with    Wound Check     Bruising to left eye     60-year-old male presents to the emergency department accompanied by wife with concern for bruising to the left eye and wound check. Patient was recently seen and evaluated for a fall with laceration to the left forehead. Patient now has some ecchymosis around the left and right eye with associated swelling. Patient also has a nasal bone fracture. Overall he is in no distress despite the swelling around the left eye. Wife reports icing the area intermittently. Prior to Admission Medications   Prescriptions Last Dose Informant Patient Reported? Taking?    Multiple Vitamin (MULTIVITAMIN) tablet  Spouse/Significant Other Yes No   Sig: Take by mouth daily    Multiple Vitamins-Minerals (PRESERVISION AREDS 2 PO)  Spouse/Significant Other Yes No   Sig: Take by mouth   acetaminophen (TYLENOL) 325 mg tablet  Spouse/Significant Other No No   Sig: Take 2 tablets (650 mg total) by mouth every 6 (six) hours as needed for mild pain   loratadine (CLARITIN) 10 mg tablet   No No   Sig: Take 1 tablet (10 mg total) by mouth daily   simvastatin (ZOCOR) 20 mg tablet  Spouse/Significant Other No No   Sig: Take 1 tablet (20 mg total) by mouth daily at bedtime      Facility-Administered Medications: None       Past Medical History:   Diagnosis Date    Arthritis     Encounter for general adult medical examination without abnormal findings 03/20/2019    Fall 11/03/2022    Hyperlipidemia     Macular degeneration, 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[address])     Urinary retention 06/02/2022       Past Surgical History:   Procedure Laterality Date    BRAIN HEMATOMA EVACUATION Left 05/28/2022    Procedure: left CRANIOTOMY FOR SUBDURAL HEMATOMA;  Surgeon: Adry Staton MD;  Location: [facility];  Service: Neurosurgery    CARPAL TUNNEL RELEASE      HERNIA REPAIR Right     inguinal    IR CEREBRAL ANGIOGRAPHY / INTERVENTION  06/02/2022    [place] COCHLEAR DEVICE IMPLANTATION W/WO MASTOIDECTOMY Left 1/17/2023    Procedure: LEFT COCHLEAR IMPLANTATION WITH MASTOIDECTOMY AND FACIAL NERVE MONITORING WITH AUDIOMETRIC TESTING OF THE IMPLANT;  Surgeon: Bc Cormier MD;  Location:  MAIN OR;  Service: ENT    [place] NEUROPLASTY &/TRANSPOS MEDIAN NRV CARPAL [place] Right 09/20/2021    Procedure: RELEASE CARPAL TUNNEL;  Surgeon: Fayne Collet, MD;  Location: [facility];  Service: Orthopedics       History reviewed. No pertinent family history. I have reviewed and agree with the history as documented. E-Cigarette/Vaping    E-Cigarette Use Never User      E-Cigarette/Vaping Substances     Social History     Tobacco Use    Smoking status: Former    Smokeless tobacco: Never    Tobacco comments:     Smoked as a teenager   Vaping Use    Vaping Use: Never used   Substance Use Topics    Alcohol use: Yes     Alcohol/week: 3.0 standard drinks of alcohol     Types: 3 Cans of beer per week    Drug use: No       Review of Systems   HENT:  Negative for dental problem, facial swelling, hearing loss and tinnitus. Eyes:  Negative for pain and visual disturbance. Respiratory:  Negative for chest tightness and shortness of breath. Cardiovascular:  Negative for chest pain. Gastrointestinal:  Negative for abdominal pain. Musculoskeletal:  Negative for back pain and neck pain. Skin:  Positive for wound. Neurological:  Negative for dizziness and headaches. All other systems reviewed and are negative. Physical Exam  Physical Exam  Vitals and nursing note reviewed. Constitutional:       General: He is not in acute distress. Appearance: Normal appearance. He is well-developed and well-groomed. HENT:      Head: Normocephalic. Comments: Swelling of the left forehead and around the left eye with associated ecchymosis around the bilateral eyes     Right Ear: External ear normal.      Left Ear: External ear normal.      Nose: Nose normal.      Mouth/Throat:      Lips: Pink.    Eyes: General: Lids are normal. Gaze aligned appropriately. Cardiovascular:      Rate and Rhythm: Normal rate. Pulses: Normal pulses. Pulmonary:      Effort: Pulmonary effort is normal. No respiratory distress. Abdominal:      Palpations: Abdomen is soft. Tenderness: There is no abdominal tenderness. Skin:     General: Skin is warm. Capillary Refill: Capillary refill takes less than 2 seconds. Neurological:      General: No focal deficit present. Mental Status: He is alert and oriented to person, place, and time. Mental status is at baseline. Psychiatric:         Behavior: Behavior is cooperative. Vital Signs  ED Triage Vitals [11/12/23 0947]   Temperature Pulse Respirations Blood Pressure SpO2   97.6 °F (36.4 °C) 68 18 145/70 96 %      Temp Source Heart Rate Source Patient Position - Orthostatic VS BP Location FiO2 (%)   Oral Monitor Lying Left arm --      Pain Score       --           Vitals:    11/12/23 0947   BP: 145/70   Pulse: 68   Patient Position - Orthostatic VS: Lying         Visual Acuity      ED Medications  Medications - No data to display    Diagnostic Studies  Results Reviewed       None                   No orders to display              Procedures  Procedures         ED Course                                             Medical Decision Making  Forehead laceration was redressed. The associated ecchymosis around the eyes and swelling is likely secondary to very recent forehead injury and laceration and nasal bone fracture. No active bleeding is observed. Recommend continued conservative measures and close outpatient follow-up with PCP to ensure resolution of healing. No interventions indicated at this time. Wife was counseled regarding wound care. Patient educated regarding their diagnosis and given return and follow-up instructions. Patient was advised to returned to the ED with worsening symptoms or concerns.   Patient is understanding of and in agreement with the treatment plan. There are no questions at the time of discharge. Disposition  Final diagnoses:   Visit for wound check     Time reflects when diagnosis was documented in both MDM as applicable and the Disposition within this note       Time User Action Codes Description Comment    11/12/2023 10:40 AM Damaso Counter Add [Z51.89] Visit for wound check           ED Disposition       ED Disposition   Discharge    Condition   Stable    Date/Time   Sun Nov 12, 2023 10:40 AM    Comment   [facility] discharge to home/self care. Follow-up Information       Follow up With Specialties Details Why [address] [facility], [address] [facility], Nurse Practitioner   [address] [facility] [place] [id number]  [phone]              Discharge Medication List as of 11/12/2023 10:41 AM        CONTINUE these medications which have NOT CHANGED    Details   acetaminophen (TYLENOL) 325 mg tablet Take 2 tablets (650 mg total) by mouth every 6 (six) hours as needed for mild pain, Starting Mon 6/20/2022, No Print      loratadine (CLARITIN) 10 mg tablet Take 1 tablet (10 mg total) by mouth daily, Starting Thu 9/28/2023, Normal      Multiple Vitamin (MULTIVITAMIN) tablet Take by mouth daily , Historical Med      Multiple Vitamins-Minerals (PRESERVISION AREDS 2 PO) Take by mouth, Historical Med      simvastatin (ZOCOR) 20 mg tablet Take 1 tablet (20 mg total) by mouth daily at bedtime, Starting Tue 5/17/2022, Normal             No discharge procedures on file.     PDMP Review         Value Time User    PDMP Reviewed  Yes 1/17/2023  7:37 AM Rachelle Boyer MD            ED Provider  Electronically Signed by             Faheem Han PA-C  11/12/23 6391

## 2023-11-12 NOTE — DISCHARGE INSTRUCTIONS
Please apply a thin layer antibiotic ointment to the cuts and abrasions on the face. You may apply ice to the face 20 minutes on 20 minutes off.

## 2023-11-14 ENCOUNTER — APPOINTMENT (OUTPATIENT)
Dept: SPEECH THERAPY | Facility: CLINIC | Age: 83
End: 2023-11-14
Payer: MEDICARE

## 2023-11-16 ENCOUNTER — APPOINTMENT (OUTPATIENT)
Dept: SPEECH THERAPY | Facility: CLINIC | Age: 83
End: 2023-11-16
Payer: MEDICARE

## 2023-11-17 ENCOUNTER — TRANSCRIBE ORDERS (OUTPATIENT)
Dept: NEUROSURGERY | Facility: CLINIC | Age: 83
End: 2023-11-17

## 2023-11-17 DIAGNOSIS — R13.13 PHARYNGEAL DYSPHAGIA: Primary | ICD-10-CM

## 2023-11-17 DIAGNOSIS — S06.5XAA SDH (SUBDURAL HEMATOMA) (HCC): ICD-10-CM

## 2023-11-17 DIAGNOSIS — D18.1 HYGROMA: ICD-10-CM

## 2023-11-17 DIAGNOSIS — G25.3 PHARYNGEAL MYOCLONUS: ICD-10-CM

## 2023-11-20 ENCOUNTER — OFFICE VISIT (OUTPATIENT)
Dept: FAMILY MEDICINE CLINIC | Facility: CLINIC | Age: 83
End: 2023-11-20
Payer: MEDICARE

## 2023-11-20 DIAGNOSIS — S01.81XD FACIAL LACERATION, SUBSEQUENT ENCOUNTER: Primary | ICD-10-CM

## 2023-11-20 PROCEDURE — 99212 OFFICE O/P EST SF 10 MIN: CPT | Performed by: NURSE PRACTITIONER

## 2023-11-20 NOTE — PROGRESS NOTES
Suture removal    Date/Time: 11/20/2023 10:00 AM    Performed by: RONY Quesada  Authorized by: RONY Quesada  Universal Protocol:  Procedure performed by: Afshan Redman MA)  Consent: Verbal consent not obtained. Written consent obtained. Risks and benefits: risks, benefits and alternatives were discussed  Consent given by: patient and spouse  Patient understanding: patient states understanding of the procedure being performed  Relevant documents: relevant documents present and verified  Patient identity confirmed: verbally with patient      Patient location:  Bedside  Location:     Laterality:  Left    Location:  [address] [place] [address] location:  Eyebrow    Eyebrow location:  L eyebrow  Procedure details: Tools used:  Suture removal kit, tweezers and scissors    Wound appearance:  No sign(s) of infection    Number of sutures removed:  7    Number of staples removed:  0  Post-procedure details:     Post-removal:  Antibiotic ointment applied    Patient tolerance of procedure:   Tolerated well, no immediate complications

## 2023-11-21 ENCOUNTER — TELEPHONE (OUTPATIENT)
Dept: NEUROSURGERY | Facility: CLINIC | Age: 83
End: 2023-11-21

## 2023-11-21 ENCOUNTER — OFFICE VISIT (OUTPATIENT)
Dept: SPEECH THERAPY | Facility: CLINIC | Age: 83
End: 2023-11-21
Payer: MEDICARE

## 2023-11-21 DIAGNOSIS — F80.9 SPEECH AND LANGUAGE DEFICITS: Primary | ICD-10-CM

## 2023-11-21 DIAGNOSIS — R13.10 DYSPHAGIA: ICD-10-CM

## 2023-11-21 DIAGNOSIS — R41.841 COGNITIVE COMMUNICATION DEFICIT: ICD-10-CM

## 2023-11-21 DIAGNOSIS — G25.3 PALATAL MYOCLONUS: Primary | ICD-10-CM

## 2023-11-21 DIAGNOSIS — R13.12 OROPHARYNGEAL DYSPHAGIA: ICD-10-CM

## 2023-11-21 DIAGNOSIS — G25.3 PHARYNGEAL MYOCLONUS: ICD-10-CM

## 2023-11-21 PROCEDURE — 92526 ORAL FUNCTION THERAPY: CPT

## 2023-11-21 PROCEDURE — 92507 TX SP LANG VOICE COMM INDIV: CPT

## 2023-11-21 NOTE — PROGRESS NOTES
Speech Treatment Note    Today's date: 2023  Patient’s name: Dioni Adams  : 1940  MRN: [id number]  Safety measures: SN hearing loss, fall risk   Referring provider: RONY Kruger    Visit: # 9      Subjective/Behavioral    Pleasant and Cooperative    Looks to spouse, Puma Freed, to respond but improving during sessions - pt more readily answers questions for himself. Benefits from tactile cues to look at speaker as well as additional processing time. Assessment Notes & Comments    Auditory comprehension seems to be improving slightly, as does his overall intelligibility, though still mod-severely unintelligible beyond the word level. Swallowing is about the same - not any worse. Is reminded to use strategies regularly. Intervention/Treatment Goals    Long-term goals:  Pt will improve safety and efficiency of swallowing the LRD when provided with restorative and compensatory strategies/exercises. Pt will improve cognitive communication via any communication modality for expressive and receptive input when provided with access to various options. Short-term goals:     Pt will complete oropharyngeal strengthening exercises (e.g., with IOPI) when given max v/v/t cues and biofeedback as able in order to improve efficiency of swallowing on LRD including mech/SBS and thin liquids. 10/17/23: performed anterior IOPI exercises 3 sets of 10 reps with increasing difficulty from 50%-70% of max (from max achieved today-25). Pt needed max v/v/t cues to understand the directions. 10/26/23: performed anterior IOPI exercises 3 sets of 10 at 80% of max achieved today (18). Improved direction following during today's session - less cues required. 10/30/23: IOPI trials: anterior max today = 16; reps completed at 80% of max - 4 sets of 10. Attempted to increase to 90% of max however pt unable to complete more than 2 reps at this pressure.    23: IOPI trials: anterior max today = 20; reps completed at 80%-90% of max - 5 sets of 10.  11/07/23: posterior max today = 19; reps completed at 60% of max - 3 sets of 10   11/09/23: posterior max today = 17; reps completed at 70% of max 3 sets of 10   11/21/23: posterior max = 20; pt only able to complete 7 reps with decreasing pressure intensity, max+ cues   11/21/23: anterior max = 20; pt only able to complete 3 reps with max+ cues        Pt and CG will demonstrate understanding of compensatory swallow strategies including postural changes, bolus size, use of various adaptive equipment (may include use of straw vs open cup; provale cup), appropriate pacing, oral care, etc. during 80% of opportunities given min v/v cues. 10/24/23: small sips strategy - given mod-max v/v cues, pt completed during 8/10 trials; pt coughed during approx 25% of trials during small sip use toward the end of trials - possibly r/t fatigue. Education provided re coughing is not always a sign of aspiration - could be laryngeal sensitivity or penetration; is a positive sign of possible ejection; etc. Discussed though that reducing coughing is still a goal regardless. 10/24/23: neutral/upright positioning pt achieved 50% of neutral positioning during 4/6 trials given mod/max cues. Pt was reaching for the cup vs. Bringing the cup to the mouth. It is possible that poor positioning is leading to coughing following swallows. 11/02/23: addressed posture today with use of small sips via open cup (thin liquids) with tactile cue and written cue (pt instructed to keep his back against the chair and bring the cup to his mouth (was attempting to lean down toward the cup). Pt used the strategy with consistent tactile cues during 9/10 trials. 11/02/23: education provided both verbally and in written format re double swallow strategy - instructions provided to wife. Released cueing to pt's wife who demonstrated ability to cue him for double swallow strategy.  Pt completed the strategy accurately during 8/10 trials given max cues from either clinician or his spouse. Dry immediate and delayed cough happening frequently with no significant pattern. Discussed reasons for coughing could include reflux, laryngeal sensitivity, habit, secretion-related, etc.   11/09/23: pt performed 10 small open cup sips with use of upright posture (back resting against chair; cup to mouth w/o leaning forward) and with 2x swallow strategy following sips given written and consistent verbal cues. Used all 3 strategies approx 2/3 of the time given max cues. 11/21/23: pt performed 10 small open cup sips with use of upright posture; cup to mouth w/o forward lean; 2x swallow during 25% of attempts given max v/v cues. This has been reviewed extensively with pt and handouts also provided. Pt's spouse has been cueing at home but pt has not been able to IND use the strategy. Pt will complete respiratory strengthening exercises 3 sets of 10 to support intraoral pressure required for safe/effective swallowing as well as respiratory strength for protective mechanisms (cough) when given max v/v/t cues. Pt will use functional AAC board with written text or images to communicate basic wants/needs when provided with a direct prompt with field of options <10-12 given mod v/v/t cues. 10/17/23: pt responded to various verbal and written prompts during simple, structured conversation during 50% of attempts initially. He improved with gestures to look at mouth and read written information to support input. With repetition and increase in cues, he was able to respond accurately during 90% of attempts. Pt will follow 1-2 step directions with 75% accuracy when provided with a written prompt given mod v/v cues as needed. 11/02/23: followed 2 step directions accurately during 8/10 trials given max verbal and written cues.    11/09/23: followed single step directions 10/10 IND on first verbal presentation   11/09/23: followed 2 step directions 5/5 IND on first verbal presentation   11/09/23: followed 3 step directions 1/3 given mod-max cues on 2nd presentation. 11/21/23: followed 1 step directions during 80% of opportunities IND, increased to 100% given min cues      6. Pt will participate in further low-tech AAC trials as needed in order to determine most appropriate set up for carryover. 7. Pt will provided a head gesture y/n response or point to a y/n image when provided with a written or verbal prompt with 75% accuracy given mod /v cues. 10/26/23: pt gestures yes and no to questions when asked during 80% of opportunities with repetitions of questions as needed  11/02/23: gestured y/n to respond to questions during 5/5 opportunities. 8. Pt will produce sounds in isolation accurately during 8/10 opportunities given demonstration and additional cues as needed. 10/24/23: focused on /l/, /sh/, and /k/    /l/ - increasing accuracy 6/10   /sh/ - 0/10 - pt with syllable addition (sh - oe) unable to correct  /k/  - 0/10 unable to understand directions to say /k in isolation despite max cues. 9. Pt will produce words in isolation accurately during 8/10 opportunities given demonstration and additional cues as needed. 10/24/23: produced functional/familiar words (words about self, family, names, address, etc.) - produced approx 75% of words in terms of articulatory clarity. 10/26/23: pt produced words in isolation when given a picture during 75% of opportunities in terms of intelligibility given max visual cues. 10/30/23: pt produced words in isolation accurately when asked a direct question during 90% of opportunities in terms of general intelligibility, demonstrating improvement.    10/30/23: pt produced words in short phrases (2-3 words) with clarity during 70% of opportunities in terms of general intelligibility across the phrase - demonstrating improvement   11/02/23: produced words in short phrases (2-3 words max) in response to a question during 90% of opportunities with fair-good intelligibility within the context of a pre-determined topic.    11/07/23: using visual stimulus of single items, pt generated short sentences using slow pacing/ words - words produced with clarity during approx 80% of opportunities      Frequency: 1-2x/week  Duration: 6-8 weeks     Intervention certification from: 74/22/3778  Intervention certification to: 82/1/3495     Intervention comments:   Spouse present for sessions    Plan    Pt was provided with HEP/Activities to support POC and Continue with POC  Consider updated goal for speech sound production for functional words/phrases  /k, sh, l/ sounds; cues for verbal response y/n/okay vs head nod to support use of verbal expression  HEP included /m/ and /b/    Rec d/c IOPI goals - not making progress   Consider goals for: auditory comprehension and verbal expression (functional); producing short phrases intelligibly

## 2023-11-21 NOTE — TELEPHONE ENCOUNTER
I spoke with patient's daughter regarding his current symptoms    He fell and the family was concerned about a subdural hematoma. I reviewed his images and there is no significant changes over time. He is having dysphagia. 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 We spoke about this. We also spoke about the association with cochlear implants. If the cochlear implant is MRI compatible then an MRI through the nerves exiting at the pars nervosa and hypoglossal canal is indicated.

## 2023-11-28 ENCOUNTER — APPOINTMENT (OUTPATIENT)
Dept: SPEECH THERAPY | Facility: CLINIC | Age: 83
End: 2023-11-28
Payer: MEDICARE

## 2023-11-29 ENCOUNTER — TELEPHONE (OUTPATIENT)
Dept: NEUROSURGERY | Facility: CLINIC | Age: 83
End: 2023-11-29

## 2023-11-29 NOTE — TELEPHONE ENCOUNTER
I spoke with Em. He is much better. The MRI would require removal of the Cochlear implant magnet and the benefits do not outweigh this in my view. His symptoms are somewhat better. She waas happy with all the efforts!

## 2023-11-30 ENCOUNTER — OFFICE VISIT (OUTPATIENT)
Dept: SPEECH THERAPY | Facility: CLINIC | Age: 83
End: 2023-11-30
Payer: MEDICARE

## 2023-11-30 DIAGNOSIS — R41.841 COGNITIVE COMMUNICATION DEFICIT: Primary | ICD-10-CM

## 2023-11-30 DIAGNOSIS — F80.9 SPEECH AND LANGUAGE DEFICITS: ICD-10-CM

## 2023-11-30 DIAGNOSIS — R13.12 OROPHARYNGEAL DYSPHAGIA: ICD-10-CM

## 2023-11-30 PROCEDURE — 92526 ORAL FUNCTION THERAPY: CPT

## 2023-11-30 PROCEDURE — 92523 SPEECH SOUND LANG COMPREHEN: CPT

## 2023-11-30 PROCEDURE — 92507 TX SP LANG VOICE COMM INDIV: CPT

## 2023-11-30 PROCEDURE — 92610 EVALUATE SWALLOWING FUNCTION: CPT

## 2023-11-30 NOTE — PROGRESS NOTES
Speech Language Pathology Re-Evaluation    Today's date: 2023   Patient’s name: Sari Dandy  : 1940  MRN: [id number]  Precautions (AE, allergies, behavorial, etc.): fall risk    Visit tracking:  Referring provider: Epic  Billing guidelines: CMS  Visit # 10  Insurance: [facility]  Re-evaluation projected due date: 2024    Reason for Re-Evaluation: Insurance update     Subjective  "He's doing good" - pt's wife, Ema Mcclain     Assessments    Speech Production: dynamic test using pictures   /wagon/ (wagon)  /see-bra/ (zebra)  /spi-aby/ (spider)  /rabbitz/ (rabbit)  /marco a/ (star)  /tuh-keisha/ (tomato)   /tstruck/ (truck)  /toil/ (toilet)  Deni Badillo (chair)   /corn/ (corn)  Samuel Khalil (umbrella)  /pi-ers/ (pliers)  TOTAL:  (mod-severe)    Word Discrimination:  - mild-mod    Reading short sentences aloud: /4 sentences read aloud with 1-2 missing words per sentence. PO Intake: consistencies administered - thin liquid (did not have time for solids)  Oral phase - WFL  Pharyngeal phase - suspected mild pharyngeal phase dysphagia with symptoms including immediate cough with watery eyes/wet VQ following swallows 2/5x with strategies intermittently used w/o cues provided. Symptoms are significantly reduced with use of strategies with increase in instruction and trials. Pt benefited from repetitious review with written steps to refer to. Labial strength, ROM is WFL. Labial and lingual coordination is mildly impaired (pt is apraxic). Lingual strength is mild-mod reduced. Impressions    Impressions:   Patient presents with suspected mild pharyngeal dysphagia with thin liquids evidenced by immediate automatic cough +wet VQ +watery eyes following swallows w/o use of strategies. Symptoms improve (reduce significantly) when strategies are used and pt continues to work on memorizing and sequencing strategies appropriately throughout sessions.    Pt presents with moderate cognitive communication impairment marked by auditory comprehension and discrimination deficits, as well as deficits in recall and EF (problem solving). 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 His intelligibility at the phrase-sentence level is approx 75% when context is provided. Recommendations:  Pt would benefit from continued skilled ST services for Cognition, Language, and Swallowing in order to Enhance functional communication, Improve QOL, Support safety and efficiency of swallowing , and Improve comfort during meal times     Rehabilitation prognosis: Good rehab potential to reach the established goals      Previous Goals    Long-term goals: to be achieved by time of dc  Pt will improve safety and efficiency of swallowing the LRD when provided with restorative and compensatory strategies/exercises as indicated. Pt will improve cognitive communication via any communication modality for expressive and receptive input when provided with access to various options. Short-term goals:     Pt will complete oropharyngeal strengthening exercises (e.g., with IOPI) when given max v/v/t cues and biofeedback as able in order to improve efficiency of swallowing on LRD including mech/SBS and thin liquids. -NOT MET; DISCONTINUE  10/17/23: performed anterior IOPI exercises 3 sets of 10 reps with increasing difficulty from 50%-70% of max (from max achieved today-25). Pt needed max v/v/t cues to understand the directions. 10/26/23: performed anterior IOPI exercises 3 sets of 10 at 80% of max achieved today (18). Improved direction following during today's session - less cues required. 10/30/23: IOPI trials: anterior max today = 16; reps completed at 80% of max - 4 sets of 10.  Attempted to increase to 90% of max however pt unable to complete more than 2 reps at this pressure. 11/07/23: IOPI trials: anterior max today = 20; reps completed at 80%-90% of max - 5 sets of 10.  11/07/23: posterior max today = 19; reps completed at 60% of max - 3 sets of 10   11/09/23: posterior max today = 17; reps completed at 70% of max 3 sets of 10   11/21/23: posterior max = 20; pt only able to complete 7 reps with decreasing pressure intensity, max+ cues   11/21/23: anterior max = 20; pt only able to complete 3 reps with max+ cues        Pt and CG will demonstrate understanding of compensatory swallow strategies including postural changes, bolus size, use of various adaptive equipment (may include use of straw vs open cup; provale cup), appropriate pacing, oral care, etc. during 80% of opportunities given min v/v cues. -PARTIALLY MET MODIFY & CONTINUE  10/24/23: small sips strategy - given mod-max v/v cues, pt completed during 8/10 trials; pt coughed during approx 25% of trials during small sip use toward the end of trials - possibly r/t fatigue. Education provided re coughing is not always a sign of aspiration - could be laryngeal sensitivity or penetration; is a positive sign of possible ejection; etc. Discussed though that reducing coughing is still a goal regardless. 10/24/23: neutral/upright positioning pt achieved 50% of neutral positioning during 4/6 trials given mod/max cues. Pt was reaching for the cup vs. Bringing the cup to the mouth. It is possible that poor positioning is leading to coughing following swallows. 11/02/23: addressed posture today with use of small sips via open cup (thin liquids) with tactile cue and written cue (pt instructed to keep his back against the chair and bring the cup to his mouth (was attempting to lean down toward the cup). Pt used the strategy with consistent tactile cues during 9/10 trials. 11/02/23: education provided both verbally and in written format re double swallow strategy - instructions provided to wife.  Released cueing to pt's wife who demonstrated ability to cue him for double swallow strategy. Pt completed the strategy accurately during 8/10 trials given max cues from either clinician or his spouse. Dry immediate and delayed cough happening frequently with no significant pattern. Discussed reasons for coughing could include reflux, laryngeal sensitivity, habit, secretion-related, etc.   11/09/23: pt performed 10 small open cup sips with use of upright posture (back resting against chair; cup to mouth w/o leaning forward) and with 2x swallow strategy following sips given written and consistent verbal cues. Used all 3 strategies approx 2/3 of the time given max cues. 11/21/23: pt performed 10 small open cup sips with use of upright posture; cup to mouth w/o forward lean; 2x swallow during 25% of attempts given max v/v cues. This has been reviewed extensively with pt and handouts also provided. Pt's spouse has been cueing at home but pt has not been able to IND use the strategy. Pt will complete respiratory strengthening exercises 3 sets of 10 to support intraoral pressure required for safe/effective swallowing as well as respiratory strength for protective mechanisms (cough) when given max v/v/t cues. -DISCONTINUE     Pt will use functional AAC board with written text or images to communicate basic wants/needs when provided with a direct prompt with field of options <10-12 given mod v/v/t cues. 10/17/23: pt responded to various verbal and written prompts during simple, structured conversation during 50% of attempts initially. He improved with gestures to look at mouth and read written information to support input. With repetition and increase in cues, he was able to respond accurately during 90% of attempts. Pt will follow 1-2 step directions with 75% accuracy when provided with a written prompt given mod v/v cues as needed.  -GOAL MET  11/02/23: followed 2 step directions accurately during 8/10 trials given max verbal and written cues. 11/09/23: followed single step directions 10/10 IND on first verbal presentation   11/09/23: followed 2 step directions 5/5 IND on first verbal presentation   11/09/23: followed 3 step directions 1/3 given mod-max cues on 2nd presentation. 11/21/23: followed 1 step directions during 80% of opportunities IND, increased to 100% given min cues      6. Pt will participate in further low-tech AAC trials as needed in order to determine most appropriate set up for carryover. -CONTINUE     7. Pt will provided a head gesture y/n response or point to a y/n image when provided with a written or verbal prompt with 75% accuracy given mod /v cues. -GOAL MET  10/26/23: pt gestures yes and no to questions when asked during 80% of opportunities with repetitions of questions as needed  11/02/23: gestured y/n to respond to questions during 5/5 opportunities. 8. Pt will produce sounds in isolation accurately during 8/10 opportunities given demonstration and additional cues as needed. -DISCONTINUE  10/24/23: focused on /l/, /sh/, and /k/    /l/ - increasing accuracy 6/10   /sh/ - 0/10 - pt with syllable addition (sh - oe) unable to correct  /k/  - 0/10 unable to understand directions to say /k in isolation despite max cues. 9. Pt will produce words in isolation accurately during 8/10 opportunities given demonstration and additional cues as needed. -PARTIALLY MET; MODIFY & CONTINUE  10/24/23: produced functional/familiar words (words about self, family, names, address, etc.) - produced approx 75% of words in terms of articulatory clarity. 10/26/23: pt produced words in isolation when given a picture during 75% of opportunities in terms of intelligibility given max visual cues. 10/30/23: pt produced words in isolation accurately when asked a direct question during 90% of opportunities in terms of general intelligibility, demonstrating improvement.    10/30/23: pt produced words in short phrases (2-3 words) with clarity during 70% of opportunities in terms of general intelligibility across the phrase - demonstrating improvement   11/02/23: produced words in short phrases (2-3 words max) in response to a question during 90% of opportunities with fair-good intelligibility within the context of a pre-determined topic. 11/07/23: using visual stimulus of single items, pt generated short sentences using slow pacing/ words - words produced with clarity during approx 80% of opportunities      Updated Intervention Plan    Long-term goals: to be achieved by time of dc  Pt will improve safety and efficiency of swallowing the LRD when provided with restorative and compensatory strategies/exercises. Pt will improve cognitive communication via any communication modality for expressive and receptive input when provided with access to various options. Short-term goals: to be achieved within 8 weeks  Pt will complete small sip/bite, double swallow, upright and still posture for intake of thin liquids and soft BS solids during 8/10 opportunities when strategies are reviewed and written down for pt to refer to. Pt will complete CSE including solids to determine swallowing status. Pt will complete auditory discrimination tasks (e.g., rhyming words, words with similar beginning/ends, minimal pairs, etc.) with 75% accuracy given repetitions as needed. Pt will complete auditory comprehension tasks (e.g., following directions) to support language function with 80% accuracy given min verbal cues. Pt will produce words and phrases (e.g., ordering from a menu, responding to questions, reading out loud, etc.) with fair-good word approximation needed for at least 90% intelligibility during 8/10 opportunities given min v/v cues. Pt will participate in further low-tech AAC trials as needed in order to determine most appropriate set up for supportive communication if needed.    Pt will participate in brief social interaction re topic of interest with at least 5 exchanges to support length of utterance, word finding, social engagement, etc. given min verbal cues.      Frequency: 2x/week  Duration: 6-8 weeks    Intervention certification from: 29/17/9079  Intervention certification to: 56/25/0683    Intervention comments:   Continue with soft and bite sized diet with thin liquids

## 2023-12-05 ENCOUNTER — OFFICE VISIT (OUTPATIENT)
Dept: SPEECH THERAPY | Facility: CLINIC | Age: 83
End: 2023-12-05
Payer: MEDICARE

## 2023-12-05 DIAGNOSIS — R41.841 COGNITIVE COMMUNICATION DEFICIT: Primary | ICD-10-CM

## 2023-12-05 PROCEDURE — 92507 TX SP LANG VOICE COMM INDIV: CPT

## 2023-12-05 NOTE — PROGRESS NOTES
Speech Treatment Note    Today's date: 2023  Patient’s name: Demi Garcia  : 1940  MRN: [id number]  Safety measures: fall risk, hearing deficit  Referring provider: RONY Puente    Visit: # 11    Subjective/Behavioral    Pleasant, Cooperative, and Engaged    Assessment Notes & Comments    Pt performed well today with single syllable reading tasks. Intelligibility has increased at the single syllable level but pt is often approximating. Improvement with swallow strategies however mod cues still needed. Pt will continue to benefit from skilled interventions to support safe swallow and effective functional communication. Intervention/Treatment Goals    Long-term goals: to be achieved by time of dc  Pt will improve safety and efficiency of swallowing the LRD when provided with restorative and compensatory strategies/exercises. Pt will improve cognitive communication via any communication modality for expressive and receptive input when provided with access to various options. Short-term goals: to be achieved within 8 weeks  Pt will complete small sip/bite, double swallow, upright and still posture for intake of thin liquids and soft BS solids during 8/10 opportunities when strategies are reviewed and written down for pt to refer to.   23: performed combination above during 2/2 opportunities with mod v/v cues. Pt will complete CSE including solids to determine swallowing status. Pt will complete auditory discrimination tasks (e.g., rhyming words, words with similar beginning/ends, minimal pairs, etc.) with 75% accuracy given repetitions as needed. Pt will complete auditory comprehension tasks (e.g., following directions) to support language function with 80% accuracy given min verbal cues.    Pt will produce words and phrases (e.g., ordering from a menu, responding to questions, reading out loud, etc.) with fair-good word approximation needed for at least 90% intelligibility during 8/10 opportunities given min v/v cues. 12/5/23: drill practice with producing single syllable words - pt with inconsistent accuracy, often approximating. Specific difficulty with /k/ and /d/ within words - approx 50% accracy overall. Pt will participate in further low-tech AAC trials as needed in order to determine most appropriate set up for supportive communication if needed. Pt will participate in brief social interaction re topic of interest with at least 5 exchanges to support length of utterance, word finding, social engagement, etc. given min verbal cues.      Frequency: 2x/week  Duration: 6-8 weeks    Intervention certification from: 68/88/5589  Intervention certification to: 78/14/4951    Intervention comments:   Continue with soft and bite sized diet with thin liquids     Plan    Pt was provided with HEP/Activities to support POC and Continue with POC

## 2023-12-07 ENCOUNTER — OFFICE VISIT (OUTPATIENT)
Dept: SPEECH THERAPY | Facility: CLINIC | Age: 83
End: 2023-12-07
Payer: MEDICARE

## 2023-12-07 DIAGNOSIS — R41.841 COGNITIVE COMMUNICATION DEFICIT: Primary | ICD-10-CM

## 2023-12-07 DIAGNOSIS — F80.9 SPEECH AND LANGUAGE DEFICITS: ICD-10-CM

## 2023-12-07 PROCEDURE — 92507 TX SP LANG VOICE COMM INDIV: CPT

## 2023-12-07 NOTE — PROGRESS NOTES
Speech Treatment Note    Today's date: 2023  Patient’s name: Hood Muro  : 1940  MRN: [id number]  Safety measures: fall risk, hearing deficit  Referring provider: RONY Shabazz    Visit: # 12    Subjective/Behavioral    Pleasant, Cooperative, and Engaged    Assessment Notes & Comments    Pt performed well with reading fill in the blank tasks. He followed directions well given min cues which is an improvement. His intelligibility is still significantly reduced but he has improved with use of strategies and practice with various speech tasks. We discussed that pt is demonstrating s/s consistent with AOS. Education provided re AOS and potential causes. Intervention/Treatment Goals    Long-term goals: to be achieved by time of dc  Pt will improve safety and efficiency of swallowing the LRD when provided with restorative and compensatory strategies/exercises. Pt will improve cognitive communication via any communication modality for expressive and receptive input when provided with access to various options. Short-term goals: to be achieved within 8 weeks  Pt will complete small sip/bite, double swallow, upright and still posture for intake of thin liquids and soft BS solids during 8/10 opportunities when strategies are reviewed and written down for pt to refer to.   23: performed combination above during 2/2 opportunities with mod v/v cues. Pt will complete CSE including solids to determine swallowing status. Pt will complete auditory discrimination tasks (e.g., rhyming words, words with similar beginning/ends, minimal pairs, etc.) with 75% accuracy given repetitions as needed. Pt will complete auditory comprehension tasks (e.g., following directions) to support language function with 80% accuracy given min verbal cues.      Pt will produce words and phrases (e.g., ordering from a menu, responding to questions, reading out loud, etc.) with fair-good word approximation needed for at least 90% intelligibility during 8/10 opportunities given min v/v cues. 12/5/23: drill practice with producing single syllable words - pt with inconsistent accuracy, often approximating. Specific difficulty with /k/ and /d/ within words - approx 50% accracy overall. 12/7/23: pt completed fill in the blank reading/repetition tasks at the short phrase level with occasional modeling and min cues to read the whole phrase aloud. Intelligibility during the structured and familiar task was about 75% overall. Pt will participate in further low-tech AAC trials as needed in order to determine most appropriate set up for supportive communication if needed. Pt will participate in brief social interaction re topic of interest with at least 5 exchanges to support length of utterance, word finding, social engagement, etc. given min verbal cues.      Frequency: 2x/week  Duration: 6-8 weeks    Intervention certification from: 74/39/5923  Intervention certification to: 32/85/8307    Intervention comments:   Continue with soft and bite sized diet with thin liquids     Plan    Pt was provided with HEP/Activities to support POC and Continue with POC

## 2023-12-12 ENCOUNTER — OFFICE VISIT (OUTPATIENT)
Dept: SPEECH THERAPY | Facility: CLINIC | Age: 83
End: 2023-12-12
Payer: MEDICARE

## 2023-12-12 ENCOUNTER — EVALUATION (OUTPATIENT)
Dept: PHYSICAL THERAPY | Facility: CLINIC | Age: 83
End: 2023-12-12
Payer: MEDICARE

## 2023-12-12 ENCOUNTER — TELEPHONE (OUTPATIENT)
Dept: FAMILY MEDICINE CLINIC | Facility: CLINIC | Age: 83
End: 2023-12-12

## 2023-12-12 DIAGNOSIS — R53.1 GENERALIZED WEAKNESS: ICD-10-CM

## 2023-12-12 DIAGNOSIS — F80.9 SPEECH AND LANGUAGE DEFICITS: ICD-10-CM
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
## 2023-12-12 DIAGNOSIS — R41.841 COGNITIVE COMMUNICATION DEFICIT: Primary | ICD-10-CM
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
## 2023-12-12 PROCEDURE — 97112 NEUROMUSCULAR REEDUCATION: CPT | Performed by: PHYSICAL THERAPIST

## 2023-12-12 PROCEDURE — 97162 PT EVAL MOD COMPLEX 30 MIN: CPT | Performed by: PHYSICAL THERAPIST

## 2023-12-12 PROCEDURE — 92507 TX SP LANG VOICE COMM INDIV: CPT

## 2023-12-12 NOTE — PROGRESS NOTES
PT Evaluation     Today's date: 2023  Patient name: Adela Muñiz  : 1940  MRN: [id number]  Referring provider: RONY Quesada  Dx:   Encounter Diagnosis     ICD-10-CM    1. Fall, subsequent encounter  W19. XXXD       2. Generalized weakness  R53.1                      Assessment  Assessment details: Pt is a 79 YOM referred to OPPT for falls and generalized weakness. Due to pt being [facility] despite cochlear implant, pt's wife was present to assist with history and instructions for testing. MMT and balance testing may not be fully accurate due to pt having difficulty following directions. Overall, pt demo decreased B LE strength, impaired balance, impaired coordination, and is considered a high fall risk. Due to hx of falls on uneven surfaces, he may benefit from [facility] or LRAD wagner for community ambulation to improve safety. Pt would benefit from skilled PT to maximize functional mobility, decrease fall risk, improve strength, balance, and endurance, and improve safety. Goals  ST weeks  1. TUG improve by 3 sec or > to demo improved mobility and balance  2. Complete 5 x STS with min A or < to demo improved LE strength and balance    LT weeks  1. FGA improve by 5 points or > to demo improved balance and decreased fall risk   2. Independent with updated HEP to self manage and maintain progress outside of PT  3.  Determine most appropriate and LRAD for community ambulation to maximize safety      Plan  Planned therapy interventions: therapeutic exercise, therapeutic activities, stretching, strengthening, postural training, patient education, neuromuscular re-education, balance, coordination, flexibility, functional ROM exercises, gait training and home exercise program  Frequency: 2x week  Duration in weeks: 8  Plan of Care beginning date: 2023  Plan of Care expiration date: 3/12/2024  Treatment plan discussed with: PTA, patient and family        Subjective Evaluation    History of Present Illness  Mechanism of injury: Pt is a 79 YOM referred to OPPT s/p fall. [place] while walking outside on the grass this past 23. Had scraps on his hand and bruised his face but did not have a brain bleed. Had another fall earlier in the year also on the grass. Denies pain anywhere else. Crawled to the tree and was able to pull himself up. Denies dizziness, HA, n/t. Received cochlear implant about a year ago and it helps a little bit, but he is still very [place] and had difficulty following directions. Pt's wife Brittnee Staples was present during eval to provide history. He was last seen in OPPT 2022 s/p fall resulting in SDH; L SDH w/ expansion and midline shift (noted on OP CTH). Pt underwent L craniotomy for SDH evacuation on  by Dr. Noris Cordero.    Patient Goals  Patient goals for therapy: improved balance    Pain  No pain reported          Objective      Balance Test    6 Minute Walk Test (ft):    FGA: 10/30 - poor performance may also be due to pt being [place]/difficulty following instructions   Gait Speed (ft/s):    5x Sit To Stand (s): Unable    TU.82 sec        Flowsheet Rows      Flowsheet Row Most Recent Value   Functional Gait Assessment    Gait Level Surface  1   Change in GaiT Speed 1   Gait with horizontal head turns 2   Gait with vertical head turns 2   Gait and pivot tuen  1   Step over obstacle 0   Gait with narrow base of supprt 0   Gait with eyes closed 1   Ambulating backwards 1   Steps 1   Total score  10            Attempted MMT but unable to assess due to pt having difficulty following directions     Coordination Left Right   Heel To Shin     Finger To Nose     Rapid Alternating Movement     UE     LE Abnormal  Abnormal        Gait Assessment: forward flexed posture, shuffling gait           Re-eval Date: 24    Date        Visit Count 1       FOTO        Pain In        Pain Out        Vitals             Precautions HTN, [place], cochlear implant         Manuals Neuro Re-Ed         HKM        Sidestepping         BW amb        Step ups         Amb with HT/HN          STS - raised chair  Reviewed for HEP                       Ther Ex        Nustep         Leg press        Knee flex/ext machine                                                Ther Activity        Stairs                 Gait Training        Recommend AD?          Uneven surfaces         Modalities

## 2023-12-12 NOTE — TELEPHONE ENCOUNTER
Pt was at speech therapy today and the physical therapist there is seeing him today due to have some falls. They are requesting a referral to be placed into his chart. Could you put a referral into this chart for falls he has been having? They are seeing him today.

## 2023-12-12 NOTE — PROGRESS NOTES
Speech Treatment Note    Today's date: 2023  Patient’s name: Bismark Poon  : 1940  MRN: [id number]  Safety measures: fall risk, hearing deficit  Referring provider: Prentiss Peabody, CRNP    Visit: # 15    Subjective/Behavioral    Pleasant, Cooperative, and Engaged    Pt is doing well but sustained a fall on Saturday. Discussed PT referral - pt and wife would like to f/u with this. Discussed with staff. Assessment Notes & Comments    Pt performed well with reading fill in the blank tasks. He struggled with auditory discrimination tasks at the two-item level - possibly d/t too much auditory stimulus at one time or possibly d/t STM deficits. Pt will continue to benefit from skilled interventions. Continue with POC. Intervention/Treatment Goals    Long-term goals: to be achieved by time of dc  Pt will improve safety and efficiency of swallowing the LRD when provided with restorative and compensatory strategies/exercises. Pt will improve cognitive communication via any communication modality for expressive and receptive input when provided with access to various options. Short-term goals: to be achieved within 8 weeks  Pt will complete small sip/bite, double swallow, upright and still posture for intake of thin liquids and soft BS solids during 8/10 opportunities when strategies are reviewed and written down for pt to refer to.   23: performed combination above during 2/ opportunities with mod v/v cues. Pt will complete CSE including solids to determine swallowing status. Pt will complete auditory discrimination tasks (e.g., rhyming words, words with similar beginning/ends, minimal pairs, etc.) with 75% accuracy given repetitions as needed. 23: in a picture Fo6, pt ID two spoken nouns during  attempts; when reduced to 1 item at a time, pt able to ID words during  opportunities given mod cues.      Pt will complete auditory comprehension tasks (e.g., following directions) to support language function with 80% accuracy given min verbal cues. Pt will produce words and phrases (e.g., ordering from a menu, responding to questions, reading out loud, etc.) with fair-good word approximation needed for at least 90% intelligibility during 8/10 opportunities given min v/v cues. 12/5/23: drill practice with producing single syllable words - pt with inconsistent accuracy, often approximating. Specific difficulty with /k/ and /d/ within words - approx 50% accracy overall. 12/7/23: pt completed fill in the blank reading/repetition tasks at the short phrase level with occasional modeling and min cues to read the whole phrase aloud. Intelligibility during the structured and familiar task was about 75% overall. 12/12/23: fill in the blank tasks with 2-3 written options 16/16 given occ verbal cues. Pt will participate in further low-tech AAC trials as needed in order to determine most appropriate set up for supportive communication if needed. Pt will participate in brief social interaction re topic of interest with at least 5 exchanges to support length of utterance, word finding, social engagement, etc. given min verbal cues.      Frequency: 2x/week  Duration: 6-8 weeks    Intervention certification from: 76/14/6161  Intervention certification to: 99/92/5207    Intervention comments:   Continue with soft and bite sized diet with thin liquids     Plan    Pt was provided with HEP/Activities to support POC and Continue with POC

## 2023-12-14 ENCOUNTER — OFFICE VISIT (OUTPATIENT)
Dept: PHYSICAL THERAPY | Facility: CLINIC | Age: 83
End: 2023-12-14
Payer: MEDICARE

## 2023-12-14 ENCOUNTER — OFFICE VISIT (OUTPATIENT)
Dept: SPEECH THERAPY | Facility: CLINIC | Age: 83
End: 2023-12-14
Payer: MEDICARE

## 2023-12-14 DIAGNOSIS — R41.841 COGNITIVE COMMUNICATION DEFICIT: Primary | ICD-10-CM

## 2023-12-14 DIAGNOSIS — F80.9 SPEECH AND LANGUAGE DEFICITS: ICD-10-CM

## 2023-12-14 DIAGNOSIS — R53.1 GENERALIZED WEAKNESS: ICD-10-CM
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
## 2023-12-14 PROCEDURE — 97112 NEUROMUSCULAR REEDUCATION: CPT

## 2023-12-14 PROCEDURE — 97110 THERAPEUTIC EXERCISES: CPT

## 2023-12-14 PROCEDURE — 92507 TX SP LANG VOICE COMM INDIV: CPT

## 2023-12-14 NOTE — PROGRESS NOTES
Daily Note     Today's date: 2023  Patient name: Harris De Dios  : 1940  MRN: [id number]  Referring provider: RONY Quiroz  Dx:   Encounter Diagnosis     ICD-10-CM    1. Fall, subsequent encounter  W19. XXXD       2. Generalized weakness  R53.1                      Subjective: Pt limited verbally/ [place]  Pt spouse states no recent fall  face is still weeping from past fall/trauma      Objective: See treatment diary below      Assessment: Tolerated treatment fairly well. Pt provided moderate TC's and mirroring with introduction of exer  Pt identifies written cues  Pt refused to do some exer this date, seeming reluctant to try them/with spouse encouragement  denied any increase pain /discomfort  HEP educ/issued, spouse encourage carryover daily Patient would benefit from continued PT      Plan: Continue per plan of care. Re-eval Date: 24    Date       Visit Count 1 2      FOTO        Pain In        Pain Out        Vitals             Precautions HTN, [place], cochlear implant         Manuals                                        Neuro Re-Ed         HKM  @ mirror  5 x      Sidestepping   @ mirror  4x      BW amb  @ mirror  5x      Step ups   4"  10x ea    Toe taps  20x      Hurdles  4" step/6" hurdles  Fwd 4 laps  Lat 4 laps      Amb with HT/HN          STS - raised chair  Reviewed for HEP                       Ther Ex        Nustep   L3 10 min  Focus cardiovascular endurance  Cues pacing        Leg press        Knee flex/ext machine  22#  2x10 ea                                              Ther Activity        Stairs                 Gait Training        Recommend AD? Uneven surfaces         Modalities                          Access Code: [email or web address]  URL: [email or web address]  Date: 2023  Prepared by: Amelie Workman    Exercises  - Side Stepping with Counter Support  - 1 x daily - 7 x weekly - 10 reps  - Standing Toe Taps  - 1 x daily - 7 x weekly - 3 sets - 10 reps

## 2023-12-14 NOTE — PROGRESS NOTES
Speech Treatment Note    Today's date: 2023  Patient’s name: Mahamed Reynolds  : 1940  MRN: [id number]  Safety measures: fall risk, hearing deficit  Referring provider: RONY Funez    Visit: # 14    Subjective/Behavioral    Pleasant, Cooperative, and Engaged    Slightly more fatigued after having PT prior to speech. Assessment Notes & Comments    Pt fatigued today. Difficulty with motivation and buy-in today. Pt performs better on tasks in which the instructions can be inferred or are very simple. Needs extensive cues. Continue with POC. Intervention/Treatment Goals    Long-term goals: to be achieved by time of dc  Pt will improve safety and efficiency of swallowing the LRD when provided with restorative and compensatory strategies/exercises. Pt will improve cognitive communication via any communication modality for expressive and receptive input when provided with access to various options. Short-term goals: to be achieved within 8 weeks  Pt will complete small sip/bite, double swallow, upright and still posture for intake of thin liquids and soft BS solids during 10 opportunities when strategies are reviewed and written down for pt to refer to.   23: performed combination above during 2/2 opportunities with mod v/v cues. Pt will complete CSE including solids to determine swallowing status. Pt will complete auditory discrimination tasks (e.g., rhyming words, words with similar beginning/ends, minimal pairs, etc.) with 75% accuracy given repetitions as needed. 23: in a picture Fo6, pt ID two spoken nouns during / attempts; when reduced to 1 item at a time, pt able to ID words during  opportunities given mod cues. Pt will complete auditory comprehension tasks (e.g., following directions) to support language function with 80% accuracy given min verbal cues.    23:  for simple auditory comprehension    Pt will produce words and phrases (e.g., ordering from a menu, responding to questions, reading out loud, etc.) with fair-good word approximation needed for at least 90% intelligibility during 8/10 opportunities given min v/v cues. 12/5/23: drill practice with producing single syllable words - pt with inconsistent accuracy, often approximating. Specific difficulty with /k/ and /d/ within words - approx 50% accracy overall. 12/7/23: pt completed fill in the blank reading/repetition tasks at the short phrase level with occasional modeling and min cues to read the whole phrase aloud. Intelligibility during the structured and familiar task was about 75% overall. 12/12/23: fill in the blank tasks with 2-3 written options 16/16 given occ verbal cues. 12/14/23: naming pictures: 13/14    Pt will participate in further low-tech AAC trials as needed in order to determine most appropriate set up for supportive communication if needed. Pt will participate in brief social interaction re topic of interest with at least 5 exchanges to support length of utterance, word finding, social engagement, etc. given min verbal cues.      Frequency: 2x/week  Duration: 6-8 weeks    Intervention certification from: 71/10/9672  Intervention certification to: 52/23/0251    Intervention comments:   Continue with soft and bite sized diet with thin liquids     Plan    Pt was provided with HEP/Activities to support POC and Continue with POC

## 2023-12-19 ENCOUNTER — APPOINTMENT (OUTPATIENT)
Dept: SPEECH THERAPY | Facility: CLINIC | Age: 83
End: 2023-12-19
Payer: MEDICARE

## 2023-12-20 ENCOUNTER — OFFICE VISIT (OUTPATIENT)
Dept: SPEECH THERAPY | Facility: CLINIC | Age: 83
End: 2023-12-20
Payer: MEDICARE

## 2023-12-20 ENCOUNTER — OFFICE VISIT (OUTPATIENT)
Dept: PHYSICAL THERAPY | Facility: CLINIC | Age: 83
End: 2023-12-20
Payer: MEDICARE

## 2023-12-20 DIAGNOSIS — F80.9 SPEECH AND LANGUAGE DEFICITS: ICD-10-CM

## 2023-12-20 DIAGNOSIS — R53.1 GENERALIZED WEAKNESS: ICD-10-CM
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
## 2023-12-20 DIAGNOSIS — R13.12 OROPHARYNGEAL DYSPHAGIA: ICD-10-CM

## 2023-12-20 DIAGNOSIS — R41.841 COGNITIVE COMMUNICATION DEFICIT: Primary | ICD-10-CM

## 2023-12-20 PROCEDURE — 97110 THERAPEUTIC EXERCISES: CPT

## 2023-12-20 PROCEDURE — 92507 TX SP LANG VOICE COMM INDIV: CPT

## 2023-12-20 PROCEDURE — 97112 NEUROMUSCULAR REEDUCATION: CPT

## 2023-12-20 NOTE — PROGRESS NOTES
Speech Treatment Note    Today's date: 2023  Patient’s name: Thomas Chase  : 1940  MRN: [id number]  Safety measures: fall risk, hearing deficit  Referring provider: Jesenia Badillo CRNP    Visit: # 15    Subjective/Behavioral    Pleasant, Cooperative, and Engaged    Assessment Notes & Comments    Pt was engaged and performed well on tasks today. Not as much fatigue compared to last session. Continue with POC. He benefits from multimodal cues from clinician and from his spouse. Continues to need at least moderate cues but language expression has improved in terms of amount and intelligibility.     Intervention/Treatment Goals    Long-term goals: to be achieved by time of dc  Pt will improve safety and efficiency of swallowing the LRD when provided with restorative and compensatory strategies/exercises.      Pt will improve cognitive communication via any communication modality for expressive and receptive input when provided with access to various options.    Short-term goals: to be achieved within 8 weeks  Pt will complete small sip/bite, double swallow, upright and still posture for intake of thin liquids and soft BS solids during 8/10 opportunities when strategies are reviewed and written down for pt to refer to.   23: performed combination above during 2/2 opportunities with mod v/v cues.     Pt will complete CSE including solids to determine swallowing status.     Pt will complete auditory discrimination tasks (e.g., rhyming words, words with similar beginning/ends, minimal pairs, etc.) with 75% accuracy given repetitions as needed.   23: in a picture Fo6, pt ID two spoken nouns during  attempts; when reduced to 1 item at a time, pt able to ID words during  opportunities given mod cues.     Pt will complete auditory comprehension tasks (e.g., following directions) to support language function with 80% accuracy given min verbal cues.   23:  for simple auditory  comprehension  12/20/23: 1 step auditory tasks (object ID) - 7/7 given min cues   12/20/23: 2 step auditory tasks (object ID) - 4/5 mod-max cues       Pt will produce words and phrases (e.g., ordering from a menu, responding to questions, reading out loud, etc.) with fair-good word approximation needed for at least 90% intelligibility during 8/10 opportunities given min v/v cues.   12/5/23: drill practice with producing single syllable words - pt with inconsistent accuracy, often approximating. Specific difficulty with /k/ and /d/ within words - approx 50% accracy overall.   12/7/23: pt completed fill in the blank reading/repetition tasks at the short phrase level with occasional modeling and min cues to read the whole phrase aloud. Intelligibility during the structured and familiar task was about 75% overall.   12/12/23: fill in the blank tasks with 2-3 written options 16/16 given occ verbal cues.   12/14/23: naming pictures: 13/14 12/20/23: pt named items within a given category 30/30 with occ repetition of information as needed - fair approximation and approx 80-90% intelligibility (context provided)      Pt will participate in further low-tech AAC trials as needed in order to determine most appropriate set up for supportive communication if needed.     Pt will participate in brief social interaction re topic of interest with at least 5 exchanges to support length of utterance, word finding, social engagement, etc. given min verbal cues.   12/20/23: engaged in brief social interaction re topic of interest ([place], upcoming holiday) with 7+ exchanges (QA format) when given speaker's visual cue to mouth and slow speech presentation. Pt still looking to his wife for responses at time but eventually looks at speaker given min cues as needed.     Frequency: 2x/week  Duration: 6-8 weeks    Intervention certification from: 11/30/2023  Intervention certification to: 01/25/2024    Intervention comments:   Continue  with soft and bite sized diet with thin liquids     Plan    Pt was provided with HEP/Activities to support POC and Continue with POC

## 2023-12-20 NOTE — PROGRESS NOTES
"Daily Note     Today's date: 2023  Patient name: Thomas Chase  : 1940  MRN: [id number]  Referring provider: Jesenia Badillo CRNP  Dx:   Encounter Diagnosis     ICD-10-CM    1. Fall, subsequent encounter  W19.XXXD       2. Generalized weakness  R53.1                      Subjective: No new co's/falls per spouse      Objective: See treatment diary below      Assessment: Tolerated treatment well. Struggles with step down activ  Pt requires max T/MC's, mirror to perform TE  challenge cognative/pt non verbal  Patient would benefit from continued PT      Plan: Continue per plan of care.      Re-eval Date: 24    Date      Visit Count 1 2 3     FOTO        Pain In        Pain Out        Vitals             Precautions HTN, [place], cochlear implant         Manuals                                        Neuro Re-Ed         HKM  @ mirror  5 x Alt toe taps   8\"1-2 UE     Sidestepping   @ mirror  4x 4 laps  //  2 UE     BW amb  @ mirror  5x REsume     Step ups   4\"  10x ea    Toe taps  20x 8\"   Fw/Lat  10x ea    Step up/over 10x  8\"     Hurdles  4\" step/6\" hurdles  Fwd 4 laps  Lat 4 laps      Dynamic reach   Firm  Floor<shoulder height  5 min     Bean bag toss   Firm/foam  5 min     Amb with HT/HN          STS - raised chair  Reviewed for HEP                       Ther Ex        Nustep   L3 10 min  Focus cardiovascular endurance  Cues pacing   L3 10 min  Focus cardiovascular endurance  Cues pacing     Leg press        Knee flex/ext machine  22#  2x10 ea                                              Ther Activity        Stairs                 Gait Training        Recommend AD?         Uneven surfaces         Modalities                          Access Code: [email or web address]  URL: [email or web address]  Date: 2023  Prepared by: Ghislaine Johnson    Exercises  - Side Stepping with Counter Support  - 1 x daily - 7 x weekly - 10 reps  - Standing Toe Taps  - 1 x daily - 7 x " weekly - 3 sets - 10 reps

## 2023-12-21 ENCOUNTER — OFFICE VISIT (OUTPATIENT)
Dept: SPEECH THERAPY | Facility: CLINIC | Age: 83
End: 2023-12-21
Payer: MEDICARE

## 2023-12-21 ENCOUNTER — OFFICE VISIT (OUTPATIENT)
Dept: PODIATRY | Facility: CLINIC | Age: 83
End: 2023-12-21
Payer: MEDICARE

## 2023-12-21 ENCOUNTER — OFFICE VISIT (OUTPATIENT)
Dept: PHYSICAL THERAPY | Facility: CLINIC | Age: 83
End: 2023-12-21
Payer: MEDICARE

## 2023-12-21 VITALS — DIASTOLIC BLOOD PRESSURE: 81 MMHG | HEART RATE: 99 BPM | SYSTOLIC BLOOD PRESSURE: 118 MMHG

## 2023-12-21 DIAGNOSIS — R13.12 OROPHARYNGEAL DYSPHAGIA: ICD-10-CM

## 2023-12-21 DIAGNOSIS — B35.1 ONYCHOMYCOSIS: Primary | ICD-10-CM

## 2023-12-21 DIAGNOSIS — I73.9 PERIPHERAL VASCULAR DISEASE, UNSPECIFIED (HCC): ICD-10-CM

## 2023-12-21 DIAGNOSIS — F80.9 SPEECH AND LANGUAGE DEFICITS: ICD-10-CM

## 2023-12-21 DIAGNOSIS — R41.841 COGNITIVE COMMUNICATION DEFICIT: Primary | ICD-10-CM

## 2023-12-21 DIAGNOSIS — R53.1 GENERALIZED WEAKNESS: ICD-10-CM
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
## 2023-12-21 PROCEDURE — 11721 DEBRIDE NAIL 6 OR MORE: CPT | Performed by: PODIATRIST

## 2023-12-21 PROCEDURE — 97110 THERAPEUTIC EXERCISES: CPT

## 2023-12-21 PROCEDURE — 92526 ORAL FUNCTION THERAPY: CPT

## 2023-12-21 PROCEDURE — 97112 NEUROMUSCULAR REEDUCATION: CPT

## 2023-12-21 PROCEDURE — 92507 TX SP LANG VOICE COMM INDIV: CPT

## 2023-12-21 NOTE — PROGRESS NOTES
Thomas Chase  1940  AT RISK FOOT CARE    1. Onychomycosis        2. Peripheral vascular disease, unspecified (HCC)            Patient presents for at-risk foot care.  Patient has no acute concerns today.  Patient has significant lower extremity risk due to diminished pulses in the feet and trophic skin changes to the lower extremity including thick toenail, atrophic skin, and decreased hair growth.      On exam patient has thickened, hypertrophic, discolored, brittle toenails with subungual debris and tenderness x10   Callus: 0  Patient has diminished pedal pulses and decreased perfusion to the lower extremities  Patient has significant trophic changes to the skin including thick toenails, decreased pedal hair and atrophic skin.     Today's treatment includes:  Debridement of toenails. Using nail nipper, álvaro, and curette, nails were sharply debrided, reduced in thickness and length. Devitalized nail tissue and fungal debris excised and removed. Patient tolerated well.      Discussed proper shoe gear, daily inspections of feet, and general foot health with patient. Patient has Q8  findings and is recommended for at risk foot care every 9-10 weeks.

## 2023-12-21 NOTE — PROGRESS NOTES
Speech Treatment Note    Today's date: 2023  Patient’s name: Thomas Chase  : 1940  MRN: [id number]  Safety measures: fall risk, hearing deficit  Referring provider: Jesenia Badillo CRNP    Visit: # 16    Subjective/Behavioral    Pleasant, Cooperative, and Engaged    Assessment Notes & Comments    Pt engaged well in tasks today. Fatigue toward the end of the session. Performed well overall on CSE, continues to need cues to use strategies but his wife does well with supporting cues for carryover. Continues to present with coughing following liquids but inconsistently, and no proof of penetration or aspiration.   Pt performed well on cognitive-linguistic task of increased complexity (see below). Continues to benefit from skilled interventions. Continue with POC.     Intervention/Treatment Goals    Long-term goals: to be achieved by time of dc  Pt will improve safety and efficiency of swallowing the LRD when provided with restorative and compensatory strategies/exercises.      Pt will improve cognitive communication via any communication modality for expressive and receptive input when provided with access to various options.    Short-term goals: to be achieved within 8 weeks  Pt will complete small sip/bite, double swallow, upright and still posture for intake of thin liquids and soft BS solids during 8/10 opportunities when strategies are reviewed and written down for pt to refer to.   23: performed combination above during 2/2 opportunities with mod v/v cues.   23: performed given max v/v/t cues during 4/5 opportunities     Pt will complete CSE including solids to determine swallowing status.   23: suspected mild oropharyngeal dysphagia symptoms during intake of thin liquids, likely also with soft and bite sized solids but with less symptoms present at bedside. Symptoms include audible gulping, and weak cough with occ anterior spill immediately following swallow. Pt has been  working on small sip/bite, 2x swallow/bite, and upright posture strategy but needs mod-max cues to follow and use consistently. Difficulty dissociating arm movement from torso movement which causes him to lean forward during intake.     Pt will complete auditory discrimination tasks (e.g., rhyming words, words with similar beginning/ends, minimal pairs, etc.) with 75% accuracy given repetitions as needed.   12/12/23: in a picture Fo6, pt ID two spoken nouns during 2/22 attempts; when reduced to 1 item at a time, pt able to ID words during 22/22 opportunities given mod cues.   12/21/23: auditory discrimination when given a semantic feature (category, color) - Fo4 - 19/20 with several repetitions per item.     Pt will complete auditory comprehension tasks (e.g., following directions) to support language function with 80% accuracy given min verbal cues.   12/14/23: 11/12 for simple auditory comprehension  12/20/23: 1 step auditory tasks (object ID) - 7/7 given min cues   12/20/23: 2 step auditory tasks (object ID) - 4/5 mod-max cues     Pt will produce words and phrases (e.g., ordering from a menu, responding to questions, reading out loud, etc.) with fair-good word approximation needed for at least 90% intelligibility during 8/10 opportunities given min v/v cues.   12/5/23: drill practice with producing single syllable words - pt with inconsistent accuracy, often approximating. Specific difficulty with /k/ and /d/ within words - approx 50% accracy overall.   12/7/23: pt completed fill in the blank reading/repetition tasks at the short phrase level with occasional modeling and min cues to read the whole phrase aloud. Intelligibility during the structured and familiar task was about 75% overall.   12/12/23: fill in the blank tasks with 2-3 written options 16/16 given occ verbal cues.   12/14/23: naming pictures: 13/14 12/20/23: pt named items within a given category 30/30 with occ repetition of information as needed -  fair approximation and approx 80-90% intelligibility (context provided)      Pt will participate in further low-tech AAC trials as needed in order to determine most appropriate set up for supportive communication if needed.     Pt will participate in brief social interaction re topic of interest with at least 5 exchanges to support length of utterance, word finding, social engagement, etc. given min verbal cues.   12/20/23: engaged in brief social interaction re topic of interest ([place], upcoming holiday) with 7+ exchanges (QA format) when given speaker's visual cue to mouth and slow speech presentation. Pt still looking to his wife for responses at time but eventually looks at speaker given min cues as needed.     Frequency: 2x/week  Duration: 6-8 weeks    Intervention certification from: 11/30/2023  Intervention certification to: 01/25/2024    Intervention comments:   Continue with soft and bite sized diet with thin liquids     Plan  Pt was provided with HEP/Activities to support POC and Continue with POC

## 2023-12-21 NOTE — PROGRESS NOTES
"Daily Note     Today's date: 2023  Patient name: Thomas Chase  : 1940  MRN: [id number]  Referring provider: Jesenia Badillo CRNP  Dx:   Encounter Diagnosis     ICD-10-CM    1. Fall, subsequent encounter  W19.XXXD       2. Generalized weakness  R53.1                      Subjective: No new co's      Objective: See treatment diary below      Assessment: Tolerated treatment well. Pt requires max T/VC's, demon, 2-3 words/white board to perform exer  Pt struggles with focus/attention/cognition  Pt ambul with delayed WB LLE during advance phase of gait  LOB x1 / CGA>balance recovery  Patient would benefit from continued PT      Plan: Continue per plan of care.      Re-eval Date: 24    Date     Visit Count 1 2 3 4    FOTO        Pain In        Pain Out        Vitals             Precautions HTN, [place], cochlear implant         Manuals                                        Neuro Re-Ed         HKM  @ mirror  5 x Alt toe taps   8\"1-2 UE ALt Toe taps  20x  1 UE  8\"    Sidestepping   @ mirror  4x 4 laps  //  2 UE 4 laps  @ mirror  2 UE    BW amb  @ mirror  5x REsume     Step ups   4\"  10x ea    Toe taps  20x 8\"   Fw/Lat  10x ea    Step up/over 10x  8\" 8\"  R 10x  L attempted, pt decline    Hurdles  4\" step/6\" hurdles  Fwd 4 laps  Lat 4 laps      Dynamic reach   Firm  Floor<shoulder height  5 min Firm  Floor<>waist  10x    Bean bag toss   Firm/foam  5 min Firm  5 min  Catch/toss    Amb with HT/HN          STS - raised chair  Reviewed for HEP   15x                      Ther Ex        Nustep   L3 10 min  Focus cardiovascular endurance  Cues pacing   L3 10 min  Focus cardiovascular endurance  Cues pacing L3 10 min  Focus cardiovascular endurance  Cues pacing    Leg press        Knee flex/ext machine  22#  2x10 ea                                              Ther Activity        Stairs                 Gait Training        Recommend AD?         Uneven surfaces         Modalities           "                Access Code: [email or web address]  URL: [email or web address]  Date: 12/14/2023  Prepared by: Ghislaine Johnson    Exercises  - Side Stepping with Counter Support  - 1 x daily - 7 x weekly - 10 reps  - Standing Toe Taps  - 1 x daily - 7 x weekly - 3 sets - 10 reps

## 2023-12-26 ENCOUNTER — APPOINTMENT (OUTPATIENT)
Dept: SPEECH THERAPY | Facility: CLINIC | Age: 83
End: 2023-12-26
Payer: MEDICARE

## 2023-12-26 ENCOUNTER — APPOINTMENT (OUTPATIENT)
Dept: PHYSICAL THERAPY | Facility: CLINIC | Age: 83
End: 2023-12-26
Payer: MEDICARE

## 2023-12-28 ENCOUNTER — OFFICE VISIT (OUTPATIENT)
Dept: SPEECH THERAPY | Facility: CLINIC | Age: 83
End: 2023-12-28
Payer: MEDICARE

## 2023-12-28 ENCOUNTER — OFFICE VISIT (OUTPATIENT)
Dept: PHYSICAL THERAPY | Facility: CLINIC | Age: 83
End: 2023-12-28
Payer: MEDICARE

## 2023-12-28 DIAGNOSIS — R41.841 COGNITIVE COMMUNICATION DEFICIT: Primary | ICD-10-CM

## 2023-12-28 DIAGNOSIS — R53.1 GENERALIZED WEAKNESS: ICD-10-CM

## 2023-12-28 DIAGNOSIS — F80.9 SPEECH AND LANGUAGE DEFICITS: ICD-10-CM
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
## 2023-12-28 PROCEDURE — 97112 NEUROMUSCULAR REEDUCATION: CPT

## 2023-12-28 PROCEDURE — 97110 THERAPEUTIC EXERCISES: CPT

## 2023-12-28 PROCEDURE — 92507 TX SP LANG VOICE COMM INDIV: CPT

## 2023-12-28 NOTE — PROGRESS NOTES
Speech Treatment Note    Today's date: 2023  Patient’s name: Thomas Chase  : 1940  MRN: [id number]  Safety measures: fall risk, hearing deficit  Referring provider: Jesenia Badillo CRNP    Visit: # 17    Subjective/Behavioral    Pleasant, Cooperative, and Engaged    Assessment Notes & Comments    Pt was engaged in tasks today and performed well. Increase in processing time for auditory tasks. Benefited from gradual release scaffolding for new tasks.     Intervention/Treatment Goals    Long-term goals: to be achieved by time of dc  Pt will improve safety and efficiency of swallowing the LRD when provided with restorative and compensatory strategies/exercises.      Pt will improve cognitive communication via any communication modality for expressive and receptive input when provided with access to various options.    Short-term goals: to be achieved within 8 weeks  Pt will complete small sip/bite, double swallow, upright and still posture for intake of thin liquids and soft BS solids during 8/10 opportunities when strategies are reviewed and written down for pt to refer to.   23: performed combination above during 2/2 opportunities with mod v/v cues.   23: performed given max v/v/t cues during 4/5 opportunities     Pt will complete CSE including solids to determine swallowing status.   23: suspected mild oropharyngeal dysphagia symptoms during intake of thin liquids, likely also with soft and bite sized solids but with less symptoms present at bedside. Symptoms include audible gulping, and weak cough with occ anterior spill immediately following swallow. Pt has been working on small sip/bite, 2x swallow/bite, and upright posture strategy but needs mod-max cues to follow and use consistently. Difficulty dissociating arm movement from torso movement which causes him to lean forward during intake.     Pt will complete auditory discrimination tasks (e.g., rhyming words, words with  similar beginning/ends, minimal pairs, etc.) with 75% accuracy given repetitions as needed.   12/12/23: in a picture Fo6, pt ID two spoken nouns during 2/22 attempts; when reduced to 1 item at a time, pt able to ID words during 22/22 opportunities given mod cues.   12/21/23: auditory discrimination when given a semantic feature (category, color) - Fo4 - 19/20 with several repetitions per item.     Pt will complete auditory comprehension tasks (e.g., following directions) to support language function with 80% accuracy given min verbal cues.   12/14/23: 11/12 for simple auditory comprehension  12/20/23: 1 step auditory tasks (object ID) - 7/7 given min cues   12/20/23: 2 step auditory tasks (object ID) - 4/5 mod-max cues   12/28/23: followed single step concrete and abstract directions with 90% acc IND    Pt will produce words and phrases (e.g., ordering from a menu, responding to questions, reading out loud, etc.) with fair-good word approximation needed for at least 90% intelligibility during 8/10 opportunities given min v/v cues.   12/5/23: drill practice with producing single syllable words - pt with inconsistent accuracy, often approximating. Specific difficulty with /k/ and /d/ within words - approx 50% accracy overall.   12/7/23: pt completed fill in the blank reading/repetition tasks at the short phrase level with occasional modeling and min cues to read the whole phrase aloud. Intelligibility during the structured and familiar task was about 75% overall.   12/12/23: fill in the blank tasks with 2-3 written options 16/16 given occ verbal cues.   12/14/23: naming pictures: 13/14 12/20/23: pt named items within a given category 30/30 with occ repetition of information as needed - fair approximation and approx 80-90% intelligibility (context provided)  12/28/23: read questions about weather report out loud and answered corresponding questions with increasing accuracy overall, 15/20 given fading max-min cues.  Intelligibility approx 75%      Pt will participate in further low-tech AAC trials as needed in order to determine most appropriate set up for supportive communication if needed.     Pt will participate in brief social interaction re topic of interest with at least 5 exchanges to support length of utterance, word finding, social engagement, etc. given min verbal cues.   12/20/23: engaged in brief social interaction re topic of interest ([place], upcoming holiday) with 7+ exchanges (QA format) when given speaker's visual cue to mouth and slow speech presentation. Pt still looking to his wife for responses at time but eventually looks at speaker given min cues as needed.     Frequency: 2x/week  Duration: 6-8 weeks    Intervention certification from: 11/30/2023  Intervention certification to: 01/25/2024    Intervention comments:   Continue with soft and bite sized diet with thin liquids     Plan  Pt was provided with HEP/Activities to support POC and Continue with POC

## 2023-12-28 NOTE — PROGRESS NOTES
"Daily Note     Today's date: 2023  Patient name: Thomas Chase  : 1940  MRN: [id number]  Referring provider: Jesenia Badillo CRNP  Dx:   Encounter Diagnosis     ICD-10-CM    1. Fall, subsequent encounter  W19.XXXD       2. Generalized weakness  R53.1                      Subjective: Pt's spouse states no new falls  takes his time walking      Objective: See treatment diary below      Assessment: Tolerated treatment well. Pt cont's to be resistant to descend step with LLE  does not indicate pain / discomfort with activ  Pt progress slowly with neuro exer, limit 2* communication barrier with pt [place]/cog challenges  Patient would benefit from continued PT      Plan: Continue per plan of care.      Re-eval Date: 24    Date    Visit Count 1 2 3 4 5   FOTO        Pain In        Pain Out        Vitals             Precautions HTN, [place], cochlear implant         Manuals                                        Neuro Re-Ed         HKM  @ mirror  5 x Alt toe taps   8\"1-2 UE ALt Toe taps  20x  1 UE  8\" ALt Toe taps  20x  1 UE  8\"   Sidestepping   @ mirror  4x 4 laps  //  2 UE 4 laps  @ mirror  2 UE 4 laps  //  2 UE   BW amb  @ mirror  5x REsume  resume   Step ups   4\"  10x ea    Toe taps  20x 8\"   Fw/Lat  10x ea    Step up/over 10x  8\" 8\"  R 10x  L attempted, pt decline Fwd 8\" 10x R/L    Lat R 8\" L 6\"  10x ea    2 UE   Hurdles  4\" step/6\" hurdles  Fwd 4 laps  Lat 4 laps   6\"   1 UE @ mirror  5 laps fwd  4 laps lat   Dynamic reach   Firm  Floor<shoulder height  5 min Firm  Floor<>waist  10x    Bean bag toss   Firm/foam  5 min Firm  5 min  Catch/toss    Amb with HT/HN          STS - raised chair  Reviewed for HEP   15x                      Ther Ex        Nustep   L3 10 min  Focus cardiovascular endurance  Cues pacing   L3 10 min  Focus cardiovascular endurance  Cues pacing L3 10 min  Focus cardiovascular endurance  Cues pacing L4 10 min  Focus cardiovascular endurance  Cues pacing "   Leg press        Knee flex/ext machine  22#  2x10 ea                                              Ther Activity        Stairs                 Gait Training        Recommend AD?      Clinic w/ focus eyes fwd vs floor     Uneven surfaces         Modalities                          Access Code: [email or web address]  URL: [email or web address]  Date: 12/14/2023  Prepared by: Ghislaine Johnson    Exercises  - Side Stepping with Counter Support  - 1 x daily - 7 x weekly - 10 reps  - Standing Toe Taps  - 1 x daily - 7 x weekly - 3 sets - 10 reps

## 2024-01-01 ENCOUNTER — HOME CARE VISIT (OUTPATIENT)
Dept: HOME HOSPICE | Facility: HOSPICE | Age: 84
End: 2024-01-01
Payer: MEDICARE

## 2024-01-01 ENCOUNTER — HOME CARE VISIT (OUTPATIENT)
Dept: HOME HEALTH SERVICES | Facility: HOME HEALTHCARE | Age: 84
End: 2024-01-01
Payer: MEDICARE

## 2024-01-01 VITALS
DIASTOLIC BLOOD PRESSURE: 70 MMHG | TEMPERATURE: 100 F | SYSTOLIC BLOOD PRESSURE: 80 MMHG | HEART RATE: 122 BPM | RESPIRATION RATE: 42 BRPM

## 2024-01-01 PROCEDURE — G0299 HHS/HOSPICE OF RN EA 15 MIN: HCPCS

## 2024-01-03 ENCOUNTER — OFFICE VISIT (OUTPATIENT)
Dept: PHYSICAL THERAPY | Facility: CLINIC | Age: 84
End: 2024-01-03
Payer: MEDICARE

## 2024-01-03 ENCOUNTER — OFFICE VISIT (OUTPATIENT)
Dept: SPEECH THERAPY | Facility: CLINIC | Age: 84
End: 2024-01-03
Payer: MEDICARE

## 2024-01-03 DIAGNOSIS — R53.1 GENERALIZED WEAKNESS: ICD-10-CM

## 2024-01-03 DIAGNOSIS — F80.9 SPEECH AND LANGUAGE DEFICITS: ICD-10-CM

## 2024-01-03 DIAGNOSIS — R13.12 OROPHARYNGEAL DYSPHAGIA: ICD-10-CM
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
## 2024-01-03 DIAGNOSIS — R41.841 COGNITIVE COMMUNICATION DEFICIT: Primary | ICD-10-CM

## 2024-01-03 PROCEDURE — 97112 NEUROMUSCULAR REEDUCATION: CPT

## 2024-01-03 PROCEDURE — 97110 THERAPEUTIC EXERCISES: CPT

## 2024-01-03 PROCEDURE — 92507 TX SP LANG VOICE COMM INDIV: CPT

## 2024-01-03 NOTE — PROGRESS NOTES
"Daily Note     Today's date: 1/3/2024  Patient name: Thomas Chase  : 1940  MRN: [id number]  Referring provider: Jesenia Badillo CRNP  Dx:   Encounter Diagnosis     ICD-10-CM    1. Fall, subsequent encounter  W19.XXXD       2. Generalized weakness  R53.1           Start Time: 0945  Stop Time: 1030  Total time in clinic (min): 45 minutes    Subjective: No new co's / per statement of spouse      Objective: See treatment diary below      Assessment: Tolerated treatment fairly well  Demon > difficulty with step up/down today, > festering of steps  Pt responds well with 2 word written cues vs mirroring  Pt struggles with STS w/ou UE asst   Patient would benefit from continued PT      Plan: Continue per plan of care.      Re-eval Date: 24    Date 1/3       Visit Count 6       FOTO        Pain In        Pain Out        Vitals             Precautions HTN, [place], cochlear implant         Manuals                                        Neuro Re-Ed         HKM ALt Toe taps  20x  1 UE  8\"       Sidestepping  5 laps  //  2 UE  W/ purple TB       BW amb resume       Step ups  Fwd 8\" 20x up/over      Lat 10x ea  8\" 2 UE       Hurdles Resume  6\"   1 UE @ mirror  5 laps fwd  4 laps lat       Dynamic reach NP       Bean bag toss NP       Amb with HT/HN          STS - raised chair  1 foam  Hold bolster  10x                         Ther Ex        Nustep  L4 10 min  Focus cardiovascular endurance  Cues pacing       Leg press        Knee flex/ext machine                                                Ther Activity        Stairs                 Gait Training        Recommend AD?  Clinic w/ focus eyes fwd vs floor         Uneven surfaces         Modalities                          Access Code: [email or web address]  URL: [email or web address]  Date: 2023  Prepared by: Ghislaine Johnson    Exercises  - Side Stepping with Counter Support  - 1 x daily - 7 x weekly - 10 reps  - Standing Toe Taps  - 1 x daily - 7 x " weekly - 3 sets - 10 reps

## 2024-01-03 NOTE — PROGRESS NOTES
Speech Treatment Note    Today's date: 1/3/2024  Patient’s name: Thomas Chase  : 1940  MRN: [id number]  Safety measures: fall risk, hearing deficit  Referring provider: Jesenia Badillo CRNP    Visit: # 18    Subjective/Behavioral    Pleasant, Cooperative, and Engaged    Assessment Notes & Comments    Pt was engaged in tasks today and performed well given scaffolding, continue with POC as pt continues to benefit from skilled interventions.     Intervention/Treatment Goals    Long-term goals: to be achieved by time of dc  Pt will improve safety and efficiency of swallowing the LRD when provided with restorative and compensatory strategies/exercises.      Pt will improve cognitive communication via any communication modality for expressive and receptive input when provided with access to various options.    Short-term goals: to be achieved within 8 weeks  Pt will complete small sip/bite, double swallow, upright and still posture for intake of thin liquids and soft BS solids during 8/10 opportunities when strategies are reviewed and written down for pt to refer to.   23: performed combination above during 2/2 opportunities with mod v/v cues.   23: performed given max v/v/t cues during 4/5 opportunities     Pt will complete CSE including solids to determine swallowing status.   23: suspected mild oropharyngeal dysphagia symptoms during intake of thin liquids, likely also with soft and bite sized solids but with less symptoms present at bedside. Symptoms include audible gulping, and weak cough with occ anterior spill immediately following swallow. Pt has been working on small sip/bite, 2x swallow/bite, and upright posture strategy but needs mod-max cues to follow and use consistently. Difficulty dissociating arm movement from torso movement which causes him to lean forward during intake.     Pt will complete auditory discrimination tasks (e.g., rhyming words, words with similar  beginning/ends, minimal pairs, etc.) with 75% accuracy given repetitions as needed.   12/12/23: in a picture Fo6, pt ID two spoken nouns during 2/22 attempts; when reduced to 1 item at a time, pt able to ID words during 22/22 opportunities given mod cues.   12/21/23: auditory discrimination when given a semantic feature (category, color) - Fo4 - 19/20 with several repetitions per item.     Pt will complete auditory comprehension tasks (e.g., following directions) to support language function with 80% accuracy given min verbal cues.   12/14/23: 11/12 for simple auditory comprehension  12/20/23: 1 step auditory tasks (object ID) - 7/7 given min cues   12/20/23: 2 step auditory tasks (object ID) - 4/5 mod-max cues   12/28/23: followed single step concrete and abstract directions with 90% acc IND  1/03/24: auditory comprehension tasks in the form of question with multiple choice options (Fo3) - 16/18 given min v/v cues.     Pt will produce words and phrases (e.g., ordering from a menu, responding to questions, reading out loud, etc.) with fair-good word approximation needed for at least 90% intelligibility during 8/10 opportunities given min v/v cues.   12/5/23: drill practice with producing single syllable words - pt with inconsistent accuracy, often approximating. Specific difficulty with /k/ and /d/ within words - approx 50% accracy overall.   12/7/23: pt completed fill in the blank reading/repetition tasks at the short phrase level with occasional modeling and min cues to read the whole phrase aloud. Intelligibility during the structured and familiar task was about 75% overall.   12/12/23: fill in the blank tasks with 2-3 written options 16/16 given occ verbal cues.   12/14/23: naming pictures: 13/14 12/20/23: pt named items within a given category 30/30 with occ repetition of information as needed - fair approximation and approx 80-90% intelligibility (context provided)  12/28/23: read questions about weather  report out loud and answered corresponding questions with increasing accuracy overall, 15/20 given fading max-min cues. Intelligibility approx 75%  1/03/24: provided 1-2 word answers to questions with fair-good intelligibility during 85% of attempts       Pt will participate in further low-tech AAC trials as needed in order to determine most appropriate set up for supportive communication if needed.     Pt will participate in brief social interaction re topic of interest with at least 5 exchanges to support length of utterance, word finding, social engagement, etc. given min verbal cues.   12/20/23: engaged in brief social interaction re topic of interest ([place], upcoming holiday) with 7+ exchanges (QA format) when given speaker's visual cue to mouth and slow speech presentation. Pt still looking to his wife for responses at time but eventually looks at speaker given min cues as needed.     Frequency: 2x/week  Duration: 6-8 weeks    Intervention certification from: 11/30/2023  Intervention certification to: 01/25/2024    Intervention comments:   Continue with soft and bite sized diet with thin liquids     Plan  Pt was provided with HEP/Activities to support POC and Continue with POC

## 2024-01-04 ENCOUNTER — OFFICE VISIT (OUTPATIENT)
Dept: SPEECH THERAPY | Facility: CLINIC | Age: 84
End: 2024-01-04
Payer: MEDICARE

## 2024-01-04 ENCOUNTER — OFFICE VISIT (OUTPATIENT)
Dept: PHYSICAL THERAPY | Facility: CLINIC | Age: 84
End: 2024-01-04
Payer: MEDICARE

## 2024-01-04 DIAGNOSIS — F80.9 SPEECH AND LANGUAGE DEFICITS: ICD-10-CM
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
## 2024-01-04 DIAGNOSIS — R41.841 COGNITIVE COMMUNICATION DEFICIT: Primary | ICD-10-CM

## 2024-01-04 DIAGNOSIS — R53.1 GENERALIZED WEAKNESS: ICD-10-CM

## 2024-01-04 PROCEDURE — 97112 NEUROMUSCULAR REEDUCATION: CPT | Performed by: PHYSICAL THERAPIST

## 2024-01-04 PROCEDURE — 92507 TX SP LANG VOICE COMM INDIV: CPT

## 2024-01-04 PROCEDURE — 97110 THERAPEUTIC EXERCISES: CPT | Performed by: PHYSICAL THERAPIST

## 2024-01-04 NOTE — PROGRESS NOTES
Speech Treatment Note    Today's date: 2024  Patient’s name: Thomas Chase  : 1940  MRN: [id number]  Safety measures: fall risk, hearing deficit  Referring provider: Jesenia Badillo CRNP    Visit: # 19    Subjective/Behavioral    Pleasant, Cooperative, and Engaged    Assessment Notes & Comments    Pt was engaged in tasks today and performed well given scaffolding, continue with POC as pt continues to benefit from skilled interventions.     Intervention/Treatment Goals    Long-term goals: to be achieved by time of dc  Pt will improve safety and efficiency of swallowing the LRD when provided with restorative and compensatory strategies/exercises.      Pt will improve cognitive communication via any communication modality for expressive and receptive input when provided with access to various options.    Short-term goals: to be achieved within 8 weeks  Pt will complete small sip/bite, double swallow, upright and still posture for intake of thin liquids and soft BS solids during 8/10 opportunities when strategies are reviewed and written down for pt to refer to.   23: performed combination above during 2/2 opportunities with mod v/v cues.   23: performed given max v/v/t cues during 4/5 opportunities     Pt will complete CSE including solids to determine swallowing status.   23: suspected mild oropharyngeal dysphagia symptoms during intake of thin liquids, likely also with soft and bite sized solids but with less symptoms present at bedside. Symptoms include audible gulping, and weak cough with occ anterior spill immediately following swallow. Pt has been working on small sip/bite, 2x swallow/bite, and upright posture strategy but needs mod-max cues to follow and use consistently. Difficulty dissociating arm movement from torso movement which causes him to lean forward during intake.     Pt will complete auditory discrimination tasks (e.g., rhyming words, words with similar  beginning/ends, minimal pairs, etc.) with 75% accuracy given repetitions as needed.   12/12/23: in a picture Fo6, pt ID two spoken nouns during 2/22 attempts; when reduced to 1 item at a time, pt able to ID words during 22/22 opportunities given mod cues.   12/21/23: auditory discrimination when given a semantic feature (category, color) - Fo4 - 19/20 with several repetitions per item.     Pt will complete auditory comprehension tasks (e.g., following directions) to support language function with 80% accuracy given min verbal cues.   12/14/23: 11/12 for simple auditory comprehension  12/20/23: 1 step auditory tasks (object ID) - 7/7 given min cues   12/20/23: 2 step auditory tasks (object ID) - 4/5 mod-max cues   12/28/23: followed single step concrete and abstract directions with 90% acc IND  1/03/24: auditory comprehension tasks in the form of question with multiple choice options (Fo3) - 16/18 given min v/v cues.     Pt will produce words and phrases (e.g., ordering from a menu, responding to questions, reading out loud, etc.) with fair-good word approximation needed for at least 90% intelligibility during 8/10 opportunities given min v/v cues.   12/5/23: drill practice with producing single syllable words - pt with inconsistent accuracy, often approximating. Specific difficulty with /k/ and /d/ within words - approx 50% accracy overall.   12/7/23: pt completed fill in the blank reading/repetition tasks at the short phrase level with occasional modeling and min cues to read the whole phrase aloud. Intelligibility during the structured and familiar task was about 75% overall.   12/12/23: fill in the blank tasks with 2-3 written options 16/16 given occ verbal cues.   12/14/23: naming pictures: 13/14 12/20/23: pt named items within a given category 30/30 with occ repetition of information as needed - fair approximation and approx 80-90% intelligibility (context provided)  12/28/23: read questions about weather  report out loud and answered corresponding questions with increasing accuracy overall, 15/20 given fading max-min cues. Intelligibility approx 75%  1/03/24: provided 1-2 word answers to questions with fair-good intelligibility during 85% of attempts   01/04/24: pt verbally sequenced 4-step basic household tasks with poor-fair word approximation (pt prompted to speak out loud but often used nondescript gestures instead)   01/04/23: divergent naming (5) - with intelligible approximation approx 50-75% of the time.       Pt will participate in further low-tech AAC trials as needed in order to determine most appropriate set up for supportive communication if needed.     Pt will participate in brief social interaction re topic of interest with at least 5 exchanges to support length of utterance, word finding, social engagement, etc. given min verbal cues.   12/20/23: engaged in brief social interaction re topic of interest ([place], upcoming holiday) with 7+ exchanges (QA format) when given speaker's visual cue to mouth and slow speech presentation. Pt still looking to his wife for responses at time but eventually looks at speaker given min cues as needed.     Frequency: 2x/week  Duration: 6-8 weeks    Intervention certification from: 11/30/2023  Intervention certification to: 01/25/2024    Intervention comments:   Continue with soft and bite sized diet with thin liquids     Plan  Pt was provided with HEP/Activities to support POC and Continue with POC

## 2024-01-04 NOTE — PROGRESS NOTES
"Daily Note     Today's date: 2024  Patient name: Thomas Chase  : 1940  MRN: [id number]  Referring provider: Jesenia Badillo CRNP  Dx:   Encounter Diagnosis     ICD-10-CM    1. Fall, subsequent encounter  W19.XXXD       2. Generalized weakness  R53.1                      Subjective: No new complaints.       Objective: See treatment diary below      Assessment: Attempted to decrease UE support but due to cog and communication barrier, pt did not understand and could not complete with less UE. He was challenged completing STS with no UE support but was successful with a couple reps. Patient would benefit from continued PT      Plan: Progress treatment as tolerated.       Re-eval Date: 24    Date 1/3 1/4      Visit Count 6 7      FOTO        Pain In        Pain Out        Vitals             Precautions HTN, [place], cochlear implant         Manuals                                        Neuro Re-Ed         HKM ALt Toe taps  20x  1 UE  8\" ALt Toe taps  20x  1-2 UE  8\"      Sidestepping  5 laps  //  2 UE  W/ purple TB       BW amb resume       Step ups  Fwd 8\" 20x up/over      Lat 10x ea  8\" 2 UE Fwd 8\" 20x up/over      Lat 10x ea  8\" 2 UE      Hurdles Resume  6\"   1 UE @ mirror  5 laps fwd  4 laps lat 6\" fw/lat 6 laps       Dynamic reach NP       Bean bag toss NP       Amb with HT/HN          STS - raised chair  1 foam  Hold bolster  10x   1 foam 10x                       Ther Ex        Nustep  L4 10 min  Focus cardiovascular endurance  Cues pacing L4 10 min  Focus cardiovascular endurance  Cues pacing      Leg press        Knee flex/ext machine                                                Ther Activity        Stairs                 Gait Training        Recommend AD?  Clinic w/ focus eyes fwd vs floor         Uneven surfaces         Modalities                          Access Code: [email or web address]  URL: [email or web address]  Date: 2023  Prepared by: Ghislaine Warren " Elizabeth    Exercises  - Side Stepping with Counter Support  - 1 x daily - 7 x weekly - 10 reps  - Standing Toe Taps  - 1 x daily - 7 x weekly - 3 sets - 10 reps

## 2024-01-05 ENCOUNTER — APPOINTMENT (OUTPATIENT)
Dept: PHYSICAL THERAPY | Facility: CLINIC | Age: 84
End: 2024-01-05
Payer: MEDICARE

## 2024-01-08 ENCOUNTER — APPOINTMENT (OUTPATIENT)
Dept: SPEECH THERAPY | Facility: CLINIC | Age: 84
End: 2024-01-08
Payer: MEDICARE

## 2024-01-08 ENCOUNTER — APPOINTMENT (OUTPATIENT)
Dept: PHYSICAL THERAPY | Facility: CLINIC | Age: 84
End: 2024-01-08
Payer: MEDICARE

## 2024-01-10 ENCOUNTER — EVALUATION (OUTPATIENT)
Dept: PHYSICAL THERAPY | Facility: CLINIC | Age: 84
End: 2024-01-10
Payer: MEDICARE

## 2024-01-10 ENCOUNTER — OFFICE VISIT (OUTPATIENT)
Dept: SPEECH THERAPY | Facility: CLINIC | Age: 84
End: 2024-01-10
Payer: MEDICARE
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
## 2024-01-10 DIAGNOSIS — F80.9 SPEECH AND LANGUAGE DEFICITS: ICD-10-CM

## 2024-01-10 DIAGNOSIS — R53.1 GENERALIZED WEAKNESS: ICD-10-CM

## 2024-01-10 DIAGNOSIS — R41.841 COGNITIVE COMMUNICATION DEFICIT: Primary | ICD-10-CM

## 2024-01-10 PROCEDURE — 97112 NEUROMUSCULAR REEDUCATION: CPT

## 2024-01-10 PROCEDURE — 92507 TX SP LANG VOICE COMM INDIV: CPT

## 2024-01-10 PROCEDURE — 97110 THERAPEUTIC EXERCISES: CPT

## 2024-01-10 NOTE — PROGRESS NOTES
Speech Treatment Note    Today's date: 1/10/2024  Patient’s name: Thomas Chase  : 1940  MRN: [id number]  Safety measures: fall risk, hearing deficit  Referring provider: Jesenia Badillo CRNP    Visit: # 20    Subjective/Behavioral    Pleasant, Cooperative, and Engaged    Assessment Notes & Comments    Pt was engaged in tasks today and performed well given max+ scaffolding. Pt to dc on  - approaching max potential.     Intervention/Treatment Goals    Long-term goals: to be achieved by time of dc  Pt will improve safety and efficiency of swallowing the LRD when provided with restorative and compensatory strategies/exercises.      Pt will improve cognitive communication via any communication modality for expressive and receptive input when provided with access to various options.    Short-term goals: to be achieved within 8 weeks  Pt will complete small sip/bite, double swallow, upright and still posture for intake of thin liquids and soft BS solids during 8/10 opportunities when strategies are reviewed and written down for pt to refer to.   23: performed combination above during 2/2 opportunities with mod v/v cues.   23: performed given max v/v/t cues during 4/5 opportunities     Pt will complete CSE including solids to determine swallowing status.   23: suspected mild oropharyngeal dysphagia symptoms during intake of thin liquids, likely also with soft and bite sized solids but with less symptoms present at bedside. Symptoms include audible gulping, and weak cough with occ anterior spill immediately following swallow. Pt has been working on small sip/bite, 2x swallow/bite, and upright posture strategy but needs mod-max cues to follow and use consistently. Difficulty dissociating arm movement from torso movement which causes him to lean forward during intake.     Pt will complete auditory discrimination tasks (e.g., rhyming words, words with similar beginning/ends, minimal  pairs, etc.) with 75% accuracy given repetitions as needed.   12/12/23: in a picture Fo6, pt ID two spoken nouns during 2/22 attempts; when reduced to 1 item at a time, pt able to ID words during 22/22 opportunities given mod cues.   12/21/23: auditory discrimination when given a semantic feature (category, color) - Fo4 - 19/20 with several repetitions per item.     Pt will complete auditory comprehension tasks (e.g., following directions) to support language function with 80% accuracy given min verbal cues.   12/14/23: 11/12 for simple auditory comprehension  12/20/23: 1 step auditory tasks (object ID) - 7/7 given min cues   12/20/23: 2 step auditory tasks (object ID) - 4/5 mod-max cues   12/28/23: followed single step concrete and abstract directions with 90% acc IND  1/03/24: auditory comprehension tasks in the form of question with multiple choice options (Fo3) - 16/18 given min v/v cues.   01/10/24: following written directions  and responding with appropriate answer - 32/40    Pt will produce words and phrases (e.g., ordering from a menu, responding to questions, reading out loud, etc.) with fair-good word approximation needed for at least 90% intelligibility during 8/10 opportunities given min v/v cues.   12/5/23: drill practice with producing single syllable words - pt with inconsistent accuracy, often approximating. Specific difficulty with /k/ and /d/ within words - approx 50% accracy overall.   12/7/23: pt completed fill in the blank reading/repetition tasks at the short phrase level with occasional modeling and min cues to read the whole phrase aloud. Intelligibility during the structured and familiar task was about 75% overall.   12/12/23: fill in the blank tasks with 2-3 written options 16/16 given occ verbal cues.   12/14/23: naming pictures: 13/14 12/20/23: pt named items within a given category 30/30 with occ repetition of information as needed - fair approximation and approx 80-90% intelligibility  (context provided)  12/28/23: read questions about weather report out loud and answered corresponding questions with increasing accuracy overall, 15/20 given fading max-min cues. Intelligibility approx 75%  1/03/24: provided 1-2 word answers to questions with fair-good intelligibility during 85% of attempts   01/04/24: pt verbally sequenced 4-step basic household tasks with poor-fair word approximation (pt prompted to speak out loud but often used nondescript gestures instead)   01/04/23: divergent naming (5) - with intelligible approximation approx 50-75% of the time.       Pt will participate in further low-tech AAC trials as needed in order to determine most appropriate set up for supportive communication if needed.     Pt will participate in brief social interaction re topic of interest with at least 5 exchanges to support length of utterance, word finding, social engagement, etc. given min verbal cues.   12/20/23: engaged in brief social interaction re topic of interest ([place], upcoming holiday) with 7+ exchanges (QA format) when given speaker's visual cue to mouth and slow speech presentation. Pt still looking to his wife for responses at time but eventually looks at speaker given min cues as needed.     Frequency: 2x/week  Duration: 6-8 weeks    Intervention certification from: 11/30/2023  Intervention certification to: 01/25/2024    Intervention comments:   Continue with soft and bite sized diet with thin liquids     Plan  Pt was provided with HEP/Activities to support POC and Continue with POC

## 2024-01-10 NOTE — PROGRESS NOTES
"Daily Note     Today's date: 1/10/2024  Patient name: Thomas Chase  : 1940  MRN: [id number]  Referring provider: Jesenia Badillo CRNP  Dx:   Encounter Diagnosis     ICD-10-CM    1. Fall, subsequent encounter  W19.XXXD       2. Generalized weakness  R53.1                      Subjective: No new co's  Pt's spouse denies any recent falls / sx's      Objective: See treatment diary below      Assessment: Tolerated treatment well. Pt demon better follow through with written cues, kept to 2 words tasks  Pt struggles with STS transfers  requires UE assist Pt able to perform with 2 foam on chair  Pt's HEP updated with spouse education with carryover/importance with consistency  Patient would benefit from continued PT x1 weeks to transition to DC in 1 week      Plan: Continue per plan of care.      Re-eval Date: 24    Date 1/3 1/4 1/10     Visit Count 6 7 8     FOTO        Pain In        Pain Out        Vitals             Precautions HTN, [place], cochlear implant         Manuals                                        Neuro Re-Ed         HKM ALt Toe taps  20x  1 UE  8\" ALt Toe taps  20x  1-2 UE  8\"      Sidestepping  5 laps  //  2 UE  W/ purple TB       BW amb resume       Step ups  Fwd 8\" 20x up/over      Lat 10x ea  8\" 2 UE Fwd 8\" 20x up/over      Lat 10x ea  8\" 2 UE      Hurdles Resume  6\"   1 UE @ mirror  5 laps fwd  4 laps lat 6\" fw/lat 6 laps       Dynamic reach NP       Bean bag toss NP       Chair <> chair [place]  10x   W/ threshold  Focus on turn negotiation      Amb with HT/HN          STS - raised chair  1 foam  Hold bolster  10x   1 foam 10x                       Ther Ex        Nustep  L4 10 min  Focus cardiovascular endurance  Cues pacing L4 10 min  Focus cardiovascular endurance  Cues pacing      Leg press        Knee flex/ext machine                                                Ther Activity        Stairs                 Gait Training        Recommend AD?  Clinic w/ focus eyes fwd vs floor       "   Uneven surfaces         Modalities                          Access Code: [email or web address]  URL: [email or web address]  Date: 12/14/2023  Prepared by: Ghislaine Johnson    Exercises  - Side Stepping with Counter Support  - 1 x daily - 7 x weekly - 10 reps  - Standing Toe Taps  - 1 x daily - 7 x weekly - 3 sets - 10 reps        Access Code: [email or web address]  URL: [email or web address]  Date: 01/10/2024  Prepared by: Ghislaine Johnson    Exercises  - Sit to Stand  - 1 x daily - 7 x weekly - 3 sets - 10 reps  - Standing Marching  - 1 x daily - 7 x weekly - 3 sets - 10 reps  - Side Stepping with Counter Support  - 1 x daily - 7 x weekly - 10 reps  - Step Up  - 1 x daily - 7 x weekly - 3 sets - 10 reps

## 2024-01-10 NOTE — PROGRESS NOTES
Progress Note     Today's date: 1/10/2024  Patient name: Thomas Chase  : 1940  MRN: [id number]  Referring provider: Jesenia Badillo CRNP  Dx:   Encounter Diagnosis     ICD-10-CM    1. Fall, subsequent encounter  W19.XXXD       2. Generalized weakness  R53.1                      Subjective: Reports no recent falls and no pain.     Patient Goals  Patient goals for therapy: improved balance      Objective: See treatment diary below      Assessment: Progress note completed this session. Compared to IE, pt demo some improvement in mobility and balance per TUG. He continues to demo festination of gait and slow gait speed. He also demo difficulty leading with L LE on steps and favors his R side. Due to poor hearing and cog deficits, progress is limited and FGA was not performed due to pt having difficulty follow directions. He would benefit from continued PT for 2 weeks to improve balance, get pt and his wife independent with HEP, and then transition to HEP only.     Goals  ST weeks  1. TUG improve by 3 sec or > to demo improved mobility and balance - MET  2. Complete 5 x STS with min A or < to demo improved LE strength and balance - NOT MET     LT weeks  1. FGA improve by 5 points or > to demo improved balance and decreased fall risk - D/C  2. Independent with updated HEP to self manage and maintain progress outside of PT - progressing   3. Determine most appropriate and LRAD for community ambulation to maximize safety - progressing        Plan  Planned therapy interventions: therapeutic exercise, therapeutic activities, stretching, strengthening, postural training, patient education, neuromuscular re-education, balance, coordination, flexibility, functional ROM exercises, gait training and home exercise program  Frequency: 2x week  Duration in weeks: 2  Plan of Care beginning date: 2023  Plan of Care expiration date: 3/12/2024  Treatment plan discussed with: PTA, patient and family        Balance Test 12/12 1/10   6 Minute Walk Test (ft):      FGA: 10/30 - poor performance may also be due to pt being [place]/difficulty following instructions Not assessed due to language/cog barrier   Gait Speed (ft/s):      5x Sit To Stand (s): Unable  Unable    TU.82 sec  19 sec no AD

## 2024-01-15 ENCOUNTER — OFFICE VISIT (OUTPATIENT)
Dept: PHYSICAL THERAPY | Facility: CLINIC | Age: 84
End: 2024-01-15
Payer: MEDICARE

## 2024-01-15 ENCOUNTER — OFFICE VISIT (OUTPATIENT)
Dept: SPEECH THERAPY | Facility: CLINIC | Age: 84
End: 2024-01-15
Payer: MEDICARE

## 2024-01-15 DIAGNOSIS — F80.9 SPEECH AND LANGUAGE DEFICITS: ICD-10-CM

## 2024-01-15 DIAGNOSIS — R41.841 COGNITIVE COMMUNICATION DEFICIT: Primary | ICD-10-CM

## 2024-01-15 DIAGNOSIS — R13.12 OROPHARYNGEAL DYSPHAGIA: ICD-10-CM
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
## 2024-01-15 DIAGNOSIS — R53.1 GENERALIZED WEAKNESS: ICD-10-CM

## 2024-01-15 PROCEDURE — 97110 THERAPEUTIC EXERCISES: CPT

## 2024-01-15 PROCEDURE — 97112 NEUROMUSCULAR REEDUCATION: CPT

## 2024-01-15 PROCEDURE — 92507 TX SP LANG VOICE COMM INDIV: CPT

## 2024-01-15 NOTE — PROGRESS NOTES
"Daily Note     Today's date: 1/15/2024  Patient name: Thomas Chase  : 1940  MRN: [id number]  Referring provider: Jesenia Badillo CRNP  Dx:   Encounter Diagnosis     ICD-10-CM    1. Fall, subsequent encounter  W19.XXXD       2. Generalized weakness  R53.1                      Subjective: Spouse reports she plans to take client to the gym once he's finished with his therapy.      Objective: See treatment diary below      Assessment: Tolerated treatment well with noted improvement in quality of transfers, gait, and therapeutic balance activities with repetitions and practice. He requires continuous supervision, cueing, and demonstration at times to improve sequencing. Patient demonstrated fatigue post treatment and would benefit from continued PT      Plan: Continue per plan of care.      Re-eval Date: 24     Date 1/3 1/4 1/10  1/15     Visit Count 6 7 8  9     FOTO             Pain In             Pain Out             Vitals                   Precautions HTN, [place], cochlear implant          Manuals      1/15                                                               Neuro Re-Ed              HKM ALt Toe taps  20x  1 UE  8\" ALt Toe taps  20x  1-2 UE  8\"  ALt Toe taps  20x  1-2 UE  8\"       Sidestepping  5 laps  //  2 UE  W/ purple TB           BW amb resume           Step ups  Fwd 8\" 20x up/over        Lat 10x ea  8\" 2 UE Fwd 8\" 20x up/over        Lat 10x ea  8\" 2 UE  Fwd 8\" 20x up/over        Lat 10x ea  8\" 2 UE       Hurdles Resume  6\"   1 UE @ mirror  5 laps fwd  4 laps lat 6\" fw/lat 6 laps   6\" fw/lat 6 laps        Dynamic reach NP           Bean bag toss NP           Chair <> chair nego   10x   W/ threshold  Focus on turn negotiation         Amb with HT/HN               STS - raised chair  1 foam  Hold bolster  10x    1 foam 10x   1 foam 10x  Cueing forward with BUE reach                                   Ther Ex             Nustep  L4 10 min  Focus cardiovascular endurance  Cues pacing L4 10 " min  Focus cardiovascular endurance  Cues pacing  L4 10 min  Focus cardiovascular endurance  Cues pacing       Leg press             Knee flex/ext machine                                                                                   Ther Activity             Stairs                            Gait Training             Recommend AD?  Clinic w/ focus eyes fwd vs floor              Uneven surfaces              Modalities

## 2024-01-15 NOTE — PROGRESS NOTES
"Speech Treatment Note    Today's date: 1/15/2024  Patient’s name: Thomas Chase  : 1940  MRN: [id number]  Safety measures: fall risk, hearing deficit  Referring provider: Jesenia Badillo CRNP    Visit: # 21    Subjective/Behavioral    Pleasant, Cooperative, and Engaged. Wife reported, \"About the same at home\".     Assessment Notes & Comments  Pt engaged with new clinician (covering regular provider's shift). Wife indicated pt demonstrated improved comprehension today compared to previous sessions. Max scaffolding and repetition/rephrasing of questions needed to increase comprehension and expectation of task. Counseling re: caregiver support/caregiver support groups shared with wife. Wife indicated not necessary at this time but will consider it if the need arises.     Intervention/Treatment Goals    Long-term goals: to be achieved by time of dc  Pt will improve safety and efficiency of swallowing the LRD when provided with restorative and compensatory strategies/exercises.      Pt will improve cognitive communication via any communication modality for expressive and receptive input when provided with access to various options.    Short-term goals: to be achieved within 8 weeks  Pt will complete small sip/bite, double swallow, upright and still posture for intake of thin liquids and soft BS solids during 8/10 opportunities when strategies are reviewed and written down for pt to refer to.   23: performed combination above during 2/2 opportunities with mod v/v cues.   23: performed given max v/v/t cues during 4/5 opportunities     Pt will complete CSE including solids to determine swallowing status.   23: suspected mild oropharyngeal dysphagia symptoms during intake of thin liquids, likely also with soft and bite sized solids but with less symptoms present at bedside. Symptoms include audible gulping, and weak cough with occ anterior spill immediately following swallow. Pt has been " working on small sip/bite, 2x swallow/bite, and upright posture strategy but needs mod-max cues to follow and use consistently. Difficulty dissociating arm movement from torso movement which causes him to lean forward during intake.     Pt will complete auditory discrimination tasks (e.g., rhyming words, words with similar beginning/ends, minimal pairs, etc.) with 75% accuracy given repetitions as needed.   12/12/23: in a picture Fo6, pt ID two spoken nouns during 2/22 attempts; when reduced to 1 item at a time, pt able to ID words during 22/22 opportunities given mod cues.   12/21/23: auditory discrimination when given a semantic feature (category, color) - Fo4 - 19/20 with several repetitions per item.     Pt will complete auditory comprehension tasks (e.g., following directions) to support language function with 80% accuracy given min verbal cues.   12/14/23: 11/12 for simple auditory comprehension  12/20/23: 1 step auditory tasks (object ID) - 7/7 given min cues   12/20/23: 2 step auditory tasks (object ID) - 4/5 mod-max cues   12/28/23: followed single step concrete and abstract directions with 90% acc IND  1/03/24: auditory comprehension tasks in the form of question with multiple choice options (Fo3) - 16/18 given min v/v cues.   01/10/24: following written directions  and responding with appropriate answer - 32/40  1/15/24: answered functional yes/no questions with 75% accuracy, IND. Accuracy improved to 95% given repetition of question, verbal prompts, and a model. Pt answered functional questions re: menu items with 43%, IND. Accuracy increased to 71% given mod visual and verbal cues. Performance with this task should be judged with caution due to demands of reading comprehension skills impacting auditory comprehension.     Pt will produce words and phrases (e.g., ordering from a menu, responding to questions, reading out loud, etc.) with fair-good word approximation needed for at least 90% intelligibility  during 8/10 opportunities given min v/v cues.   12/5/23: drill practice with producing single syllable words - pt with inconsistent accuracy, often approximating. Specific difficulty with /k/ and /d/ within words - approx 50% accracy overall.   12/7/23: pt completed fill in the blank reading/repetition tasks at the short phrase level with occasional modeling and min cues to read the whole phrase aloud. Intelligibility during the structured and familiar task was about 75% overall.   12/12/23: fill in the blank tasks with 2-3 written options 16/16 given occ verbal cues.   12/14/23: naming pictures: 13/14 12/20/23: pt named items within a given category 30/30 with occ repetition of information as needed - fair approximation and approx 80-90% intelligibility (context provided)  12/28/23: read questions about weather report out loud and answered corresponding questions with increasing accuracy overall, 15/20 given fading max-min cues. Intelligibility approx 75%  1/03/24: provided 1-2 word answers to questions with fair-good intelligibility during 85% of attempts   01/04/24: pt verbally sequenced 4-step basic household tasks with poor-fair word approximation (pt prompted to speak out loud but often used nondescript gestures instead)   01/04/23: divergent naming (5) - with intelligible approximation approx 50-75% of the time.   1/15/24: divergent naming (3) - intelligible approximation judged to be approx 75% of trials.       Pt will participate in further low-tech AAC trials as needed in order to determine most appropriate set up for supportive communication if needed.     Pt will participate in brief social interaction re topic of interest with at least 5 exchanges to support length of utterance, word finding, social engagement, etc. given min verbal cues.   12/20/23: engaged in brief social interaction re topic of interest (tree farm, upcoming holiday) with 7+ exchanges (QA format) when given speaker's visual cue to  mouth and slow speech presentation. Pt still looking to his wife for responses at time but eventually looks at speaker given min cues as needed.     Frequency: 2x/week  Duration: 6-8 weeks    Intervention certification from: 11/30/2023  Intervention certification to: 01/25/2024    Intervention comments:   Continue with soft and bite sized diet with thin liquids     Plan  Continue with POC.

## 2024-01-17 ENCOUNTER — APPOINTMENT (OUTPATIENT)
Dept: PHYSICAL THERAPY | Facility: CLINIC | Age: 84
End: 2024-01-17
Payer: MEDICARE

## 2024-01-18 ENCOUNTER — OFFICE VISIT (OUTPATIENT)
Dept: SPEECH THERAPY | Facility: CLINIC | Age: 84
End: 2024-01-18
Payer: MEDICARE

## 2024-01-18 ENCOUNTER — OFFICE VISIT (OUTPATIENT)
Dept: PHYSICAL THERAPY | Facility: CLINIC | Age: 84
End: 2024-01-18
Payer: MEDICARE

## 2024-01-18 ENCOUNTER — APPOINTMENT (OUTPATIENT)
Dept: SPEECH THERAPY | Facility: CLINIC | Age: 84
End: 2024-01-18
Payer: MEDICARE

## 2024-01-18 DIAGNOSIS — R41.841 COGNITIVE COMMUNICATION DEFICIT: Primary | ICD-10-CM

## 2024-01-18 DIAGNOSIS — R53.1 GENERALIZED WEAKNESS: ICD-10-CM
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
## 2024-01-18 DIAGNOSIS — R13.12 OROPHARYNGEAL DYSPHAGIA: ICD-10-CM

## 2024-01-18 PROCEDURE — 92507 TX SP LANG VOICE COMM INDIV: CPT

## 2024-01-18 PROCEDURE — 97110 THERAPEUTIC EXERCISES: CPT

## 2024-01-18 PROCEDURE — 97112 NEUROMUSCULAR REEDUCATION: CPT

## 2024-01-18 PROCEDURE — 92526 ORAL FUNCTION THERAPY: CPT

## 2024-01-18 NOTE — PROGRESS NOTES
"Daily Note     Today's date: 2024  Patient name: Thomas Chase  : 1940  MRN: [id number]  Referring provider: Jesenia Badillo CRNP  Dx:   Encounter Diagnosis     ICD-10-CM    1. Fall, subsequent encounter  W19.XXXD       2. Generalized weakness  R53.1                      Subjective: No new co's        Objective: See treatment diary below      Assessment: Tolerated treatment well. Patient  struggles with V/TC's   performs exer best with limited 2 written words cues  Pt struggled > today with cog limitations with PT apt after SP this date  Pt HEP review/performed with pt spouse, handouts provided with examples of cuing        Plan:  Dc to HEP     Re-eval Date: 24     Date 1/3 1/4 1/10  1/15  1/18   Visit Count 6 7 8  9  10   FOTO             Pain In             Pain Out             Vitals                   Precautions HTN, [place], cochlear implant          Manuals      1/15  1/18                                                             Neuro Re-Ed              HKM ALt Toe taps  20x  1 UE  8\" ALt Toe taps  20x  1-2 UE  8\"  ALt Toe taps  20x  1-2 UE  8\"  ALt Toe taps  20x  1-2 UE  8\"     Sidestepping  5 laps  //  2 UE  W/ purple TB      5 laps  //  2 UE  W/ purple TB     BW amb resume           Step ups  Fwd 8\" 20x up/over        Lat 10x ea  8\" 2 UE Fwd 8\" 20x up/over        Lat 10x ea  8\" 2 UE  Fwd 8\" 20x up/over        Lat 10x ea  8\" 2 UE  Fwd 8\" 20x up/over        Lat 10x ea  8\" 2 UE     Hurdles Resume  6\"   1 UE @ mirror  5 laps fwd  4 laps lat 6\" fw/lat 6 laps   6\" fw/lat 6 laps   6\" fw/lat 6 laps      Dynamic reach NP           Bean bag toss NP           Chair <> chair [place]   10x   W/ threshold  Focus on turn negotiation         Amb with HT/HN               STS - raised chair  1 foam  Hold bolster  10x    1 foam 10x   1 foam 10x  Cueing forward with BUE reach  1 foam 10x  Cueing forward with BUE reach                                 Ther Ex             Nustep  L4 10 min  Focus " cardiovascular endurance  Cues pacing L4 10 min  Focus cardiovascular endurance  Cues pacing  L4 10 min  Focus cardiovascular endurance  Cues pacing  L4 10 min  Focus cardiovascular endurance  Cues pacing     Leg press             Knee flex/ext machine                                                                                   Ther Activity             Stairs                            Gait Training             Recommend AD?  Clinic w/ focus eyes fwd vs floor              Uneven surfaces              Modalities

## 2024-01-18 NOTE — PROGRESS NOTES
Speech Treatment Note - Discharge    Today's date: 2024  Patient’s name: Thomas Chase  : 1940  MRN: [id number]  Safety measures: fall risk, hearing deficit  Referring provider: Jesenia Badillo CRNP    Visit: # 22    Subjective/Behavioral    Pleasant, Cooperative, and Engaged.     Pt's wife is present for session and reports she is accepting of where her  is at this time.     Assessment Notes & Comments  Pt is being discharged today for meeting max potential. Pt's wife is aware and practices various strategies to support functional communication and cognition at home. She is also aware of cueing strategies and safe swallow strategies to support safe PO intake. There were no questions at time of dc. Pt continues to present with mod-severe cognitive communication deficits across domains. Mild s/s suggestive of oropharyngeal dysphagia, managed well with no current major concerns for PNA.     Intervention/Treatment Goals    Long-term goals: to be achieved by time of dc  Pt will improve safety and efficiency of swallowing the LRD when provided with restorative and compensatory strategies/exercises.      Pt will improve cognitive communication via any communication modality for expressive and receptive input when provided with access to various options.    Short-term goals: to be achieved within 8 weeks  Pt will complete small sip/bite, double swallow, upright and still posture for intake of thin liquids and soft BS solids during 8/10 opportunities when strategies are reviewed and written down for pt to refer to. -PARTIALLY MET, DISCONTINUE  23: performed combination above during 2/2 opportunities with mod v/v cues.   23: performed given max v/v/t cues during 4/5 opportunities   24: pt's wife reports pt is not able to IND use strategies - with cues pt is able to follow the strategies intermittently; during session today, pt drank approx 4 oz of water and used strategies during 50%  of opportunities - weak dry cough produced immediately following each sip.     Pt will complete CSE including solids to determine swallowing status. -GOAL MET  12/21/23: suspected mild oropharyngeal dysphagia symptoms during intake of thin liquids, likely also with soft and bite sized solids but with less symptoms present at bedside. Symptoms include audible gulping, and weak cough with occ anterior spill immediately following swallow. Pt has been working on small sip/bite, 2x swallow/bite, and upright posture strategy but needs mod-max cues to follow and use consistently. Difficulty dissociating arm movement from torso movement which causes him to lean forward during intake.     Pt will complete auditory discrimination tasks (e.g., rhyming words, words with similar beginning/ends, minimal pairs, etc.) with 75% accuracy given repetitions as needed. -PARTIALLY MET, DISCONTINUE  12/12/23: in a picture Fo6, pt ID two spoken nouns during 2/22 attempts; when reduced to 1 item at a time, pt able to ID words during 22/22 opportunities given mod cues.   12/21/23: auditory discrimination when given a semantic feature (category, color) - Fo4 - 19/20 with several repetitions per item.     Pt will complete auditory comprehension tasks (e.g., following directions) to support language function with 80% accuracy given min verbal cues. -PARTIALLY MET, DISCONTINUE  12/14/23: 11/12 for simple auditory comprehension  12/20/23: 1 step auditory tasks (object ID) - 7/7 given min cues   12/20/23: 2 step auditory tasks (object ID) - 4/5 mod-max cues   12/28/23: followed single step concrete and abstract directions with 90% acc IND  1/03/24: auditory comprehension tasks in the form of question with multiple choice options (Fo3) - 16/18 given min v/v cues.   01/10/24: following written directions  and responding with appropriate answer - 32/40  1/15/24: answered functional yes/no questions with 75% accuracy, IND. Accuracy improved to 95%  given repetition of question, verbal prompts, and a model. Pt answered functional questions re: menu items with 43%, IND. Accuracy increased to 71% given mod visual and verbal cues. Performance with this task should be judged with caution due to demands of reading comprehension skills impacting auditory comprehension.     Pt will produce words and phrases (e.g., ordering from a menu, responding to questions, reading out loud, etc.) with fair-good word approximation needed for at least 90% intelligibility during 8/10 opportunities given min v/v cues. -PARTIALLY MET, DISCONTINUE  12/5/23: drill practice with producing single syllable words - pt with inconsistent accuracy, often approximating. Specific difficulty with /k/ and /d/ within words - approx 50% accracy overall.   12/7/23: pt completed fill in the blank reading/repetition tasks at the short phrase level with occasional modeling and min cues to read the whole phrase aloud. Intelligibility during the structured and familiar task was about 75% overall.   12/12/23: fill in the blank tasks with 2-3 written options 16/16 given occ verbal cues.   12/14/23: naming pictures: 13/14 12/20/23: pt named items within a given category 30/30 with occ repetition of information as needed - fair approximation and approx 80-90% intelligibility (context provided)  12/28/23: read questions about weather report out loud and answered corresponding questions with increasing accuracy overall, 15/20 given fading max-min cues. Intelligibility approx 75%  1/03/24: provided 1-2 word answers to questions with fair-good intelligibility during 85% of attempts   01/04/24: pt verbally sequenced 4-step basic household tasks with poor-fair word approximation (pt prompted to speak out loud but often used nondescript gestures instead)   01/04/23: divergent naming (5) - with intelligible approximation approx 50-75% of the time.   1/15/24: divergent naming (3) - intelligible approximation judged to  be approx 75% of trials.   1/18/24: pt answered questions re visual stimulus - intelligible approx judged at 75% overall       Pt will participate in further low-tech AAC trials as needed in order to determine most appropriate set up for supportive communication if needed. -NOT TARGETED, DISCONTINUE    Pt will participate in brief social interaction re topic of interest with at least 5 exchanges to support length of utterance, word finding, social engagement, etc. given min verbal cues. -PARTIALLY MET, DISCONTINUE  12/20/23: engaged in brief social interaction re topic of interest ([place], upcoming holiday) with 7+ exchanges (QA format) when given speaker's visual cue to mouth and slow speech presentation. Pt still looking to his wife for responses at time but eventually looks at speaker given min cues as needed.     Frequency: 2x/week - dc   Duration: 6-8 weeks - dc    Intervention certification from: 11/30/2023  Intervention certification to: 01/25/2024    Intervention comments:   Continue with soft and bite sized diet with thin liquids     Plan  Pt has reached max potential at this time. Dc from skilled interventions. Please seek referral back to ST should status change.

## 2024-01-22 ENCOUNTER — APPOINTMENT (OUTPATIENT)
Dept: SPEECH THERAPY | Facility: CLINIC | Age: 84
End: 2024-01-22
Payer: MEDICARE

## 2024-01-22 ENCOUNTER — APPOINTMENT (OUTPATIENT)
Dept: PHYSICAL THERAPY | Facility: CLINIC | Age: 84
End: 2024-01-22
Payer: MEDICARE

## 2024-01-24 ENCOUNTER — APPOINTMENT (OUTPATIENT)
Dept: SPEECH THERAPY | Facility: CLINIC | Age: 84
End: 2024-01-24
Payer: MEDICARE

## 2024-01-24 ENCOUNTER — APPOINTMENT (OUTPATIENT)
Dept: PHYSICAL THERAPY | Facility: CLINIC | Age: 84
End: 2024-01-24
Payer: MEDICARE

## 2024-01-29 ENCOUNTER — APPOINTMENT (OUTPATIENT)
Dept: PHYSICAL THERAPY | Facility: CLINIC | Age: 84
End: 2024-01-29
Payer: MEDICARE

## 2024-01-29 ENCOUNTER — APPOINTMENT (OUTPATIENT)
Dept: SPEECH THERAPY | Facility: CLINIC | Age: 84
End: 2024-01-29
Payer: MEDICARE

## 2024-01-31 ENCOUNTER — APPOINTMENT (OUTPATIENT)
Dept: PHYSICAL THERAPY | Facility: CLINIC | Age: 84
End: 2024-01-31
Payer: MEDICARE

## 2024-01-31 ENCOUNTER — APPOINTMENT (OUTPATIENT)
Dept: SPEECH THERAPY | Facility: CLINIC | Age: 84
End: 2024-01-31
Payer: MEDICARE

## 2024-02-06 ENCOUNTER — TELEPHONE (OUTPATIENT)
Age: 84
End: 2024-02-06

## 2024-02-06 NOTE — TELEPHONE ENCOUNTER
[facility]  [address] [place] [address], [address]  [place] [address]    [phone]    Telephone Intake: Geriatric Assessment     - Chart Review  Has this patient been seen by our department in the last 3 years? No  Please route to provider for chart review prior to scheduling and let the caller know that this phone intake will be reviewed IF -  Pt was recently hospitalized  Pt is prescribed medications for behavior management or has a history of psychiatric hospitalization  Pt plans to attend alone    Referral source: Keturah Pompa who is scheduling/relationship to pt: Allegra Chase, daughter  Caller's phone number: [phone]    Reason for referral: Family member concerns regarding behavior changes/concerns, home safety concerns, and mood concerns.  If there are behavioral concerns, is the pt prescribed medications to manage these? no   If so, how many? none   Has the patient ever had an inpatient psychiatric hospitalization? No    What is the goal of the visit? Initial Assessment & diagnosis      Has the patient been seen by a Neurologist or Geriatrician? No but seen Dr. Mac DELUNA  Pt has Kopler implant    If yes, is this appointment for a second opinion? No  Has the patient ever been diagnosed with dementia? No  Has the patient had an MRI NeuroQuant within the last 1 year? No (If so, please route to provider to determine if assessment + conference are needed or if only assessment should be scheduled)      Preferred language? English / Pt is def   Highest education level? High School   Does the patient wear glasses? Yes Reading  Does the patient use hearing aids? Yes  Pt is def      Is there a living will/healthcare POA in place/If so, who? Yes , Allegra Chase, daughter     Does the pt/caregiver have access for a virtual visit (computer/smart phone with audio/video)? No    Caller was informed:   Please make sure the pt is accompanied by someone who knows them well /  caregiver / family member to participate in this appointment.   Who will accompany the pt (name and relationship)? Allegra Chase,daughter and mother Gina   Phone number of person accompanying pt: [phone]  Please make sure the pt attends all appointments, including the assessment, care conference, follow-up, whether in-person or virtual.  For virtual visits, pt must be physically present in [facility]     Office packet mailed out to: [address] [phone]  Added to wait list for sooner appointments/notified that calls can be short notice (same day/day prior)? Yes

## 2024-02-13 ENCOUNTER — APPOINTMENT (EMERGENCY)
Dept: CT IMAGING | Facility: HOSPITAL | Age: 84
DRG: 555 | End: 2024-02-13
Payer: MEDICARE

## 2024-02-13 ENCOUNTER — APPOINTMENT (EMERGENCY)
Dept: RADIOLOGY | Facility: HOSPITAL | Age: 84
DRG: 555 | End: 2024-02-13
Payer: MEDICARE

## 2024-02-13 ENCOUNTER — HOSPITAL ENCOUNTER (INPATIENT)
Facility: HOSPITAL | Age: 84
LOS: 2 days | DRG: 555 | End: 2024-02-16
Attending: EMERGENCY MEDICINE | Admitting: INTERNAL MEDICINE
Payer: MEDICARE

## 2024-02-13 DIAGNOSIS — F32.A DEPRESSION: ICD-10-CM

## 2024-02-13 DIAGNOSIS — R26.2 AMBULATORY DYSFUNCTION: Primary | ICD-10-CM

## 2024-02-13 DIAGNOSIS — F01.50 ISCHEMIC VASCULAR DEMENTIA (HCC): ICD-10-CM

## 2024-02-13 DIAGNOSIS — E44.0 MODERATE PROTEIN-CALORIE MALNUTRITION (HCC): ICD-10-CM

## 2024-02-13 DIAGNOSIS — R53.81 DEBILITY: ICD-10-CM

## 2024-02-13 LAB
ANION GAP SERPL CALCULATED.3IONS-SCNC: 12 MMOL/L
ATRIAL RATE: 101 BPM
BASOPHILS # BLD AUTO: 0.01 THOUSANDS/ÂΜL (ref 0–0.1)
BASOPHILS NFR BLD AUTO: 0 % (ref 0–1)
BILIRUB UR QL STRIP: NEGATIVE
BUN SERPL-MCNC: 24 MG/DL (ref 5–25)
CALCIUM SERPL-MCNC: 9.4 MG/DL (ref 8.4–10.2)
CHLORIDE SERPL-SCNC: 103 MMOL/L (ref 96–108)
CLARITY UR: CLEAR
CO2 SERPL-SCNC: 22 MMOL/L (ref 21–32)
COLOR UR: YELLOW
CREAT SERPL-MCNC: 0.67 MG/DL (ref 0.6–1.3)
EOSINOPHIL # BLD AUTO: 0 THOUSAND/ÂΜL (ref 0–0.61)
EOSINOPHIL NFR BLD AUTO: 0 % (ref 0–6)
ERYTHROCYTE [DISTWIDTH] IN BLOOD BY AUTOMATED COUNT: 13.6 % (ref 11.6–15.1)
FLUAV RNA RESP QL NAA+PROBE: NEGATIVE
FLUBV RNA RESP QL NAA+PROBE: NEGATIVE
GFR SERPL CREATININE-BSD FRML MDRD: 88 ML/MIN/1.73SQ M
GLUCOSE SERPL-MCNC: 151 MG/DL (ref 65–140)
GLUCOSE UR STRIP-MCNC: NEGATIVE MG/DL
HCT VFR BLD AUTO: 39.3 % (ref 36.5–49.3)
HGB BLD-MCNC: 12.9 G/DL (ref 12–17)
HGB UR QL STRIP.AUTO: NEGATIVE
IMM GRANULOCYTES # BLD AUTO: 0.01 THOUSAND/UL (ref 0–0.2)
IMM GRANULOCYTES NFR BLD AUTO: 0 % (ref 0–2)
KETONES UR STRIP-MCNC: NEGATIVE MG/DL
LEUKOCYTE ESTERASE UR QL STRIP: NEGATIVE
LYMPHOCYTES # BLD AUTO: 0.63 THOUSANDS/ÂΜL (ref 0.6–4.47)
LYMPHOCYTES NFR BLD AUTO: 12 % (ref 14–44)
MCH RBC QN AUTO: 31.9 PG (ref 26.8–34.3)
MCHC RBC AUTO-ENTMCNC: 32.8 G/DL (ref 31.4–37.4)
MCV RBC AUTO: 97 FL (ref 82–98)
MONOCYTES # BLD AUTO: 0.61 THOUSAND/ÂΜL (ref 0.17–1.22)
MONOCYTES NFR BLD AUTO: 11 % (ref 4–12)
NEUTROPHILS # BLD AUTO: 4.23 THOUSANDS/ÂΜL (ref 1.85–7.62)
NEUTS SEG NFR BLD AUTO: 77 % (ref 43–75)
NITRITE UR QL STRIP: NEGATIVE
NRBC BLD AUTO-RTO: 0 /100 WBCS
PH UR STRIP.AUTO: 6.5 [PH]
PLATELET # BLD AUTO: 405 THOUSANDS/UL (ref 149–390)
PMV BLD AUTO: 9.1 FL (ref 8.9–12.7)
POTASSIUM SERPL-SCNC: 4.2 MMOL/L (ref 3.5–5.3)
PROT UR STRIP-MCNC: NEGATIVE MG/DL
QRS AXIS: 61 DEGREES
QRSD INTERVAL: 126 MS
QT INTERVAL: 430 MS
QTC INTERVAL: 502 MS
RBC # BLD AUTO: 4.05 MILLION/UL (ref 3.88–5.62)
RSV RNA RESP QL NAA+PROBE: NEGATIVE
SARS-COV-2 RNA RESP QL NAA+PROBE: NEGATIVE
SODIUM SERPL-SCNC: 137 MMOL/L (ref 135–147)
SP GR UR STRIP.AUTO: 1.02
T WAVE AXIS: 59 DEGREES
TSH SERPL DL<=0.05 MIU/L-ACNC: 2.42 UIU/ML (ref 0.45–4.5)
UROBILINOGEN UR QL STRIP.AUTO: 1 E.U./DL
VENTRICULAR RATE: 82 BPM
WBC # BLD AUTO: 5.49 THOUSAND/UL (ref 4.31–10.16)

## 2024-02-13 PROCEDURE — 80048 BASIC METABOLIC PNL TOTAL CA: CPT | Performed by: EMERGENCY MEDICINE

## 2024-02-13 PROCEDURE — 99285 EMERGENCY DEPT VISIT HI MDM: CPT

## 2024-02-13 PROCEDURE — 81003 URINALYSIS AUTO W/O SCOPE: CPT | Performed by: PHYSICIAN ASSISTANT

## 2024-02-13 PROCEDURE — 93010 ELECTROCARDIOGRAM REPORT: CPT | Performed by: INTERNAL MEDICINE

## 2024-02-13 PROCEDURE — 71045 X-RAY EXAM CHEST 1 VIEW: CPT

## 2024-02-13 PROCEDURE — 0241U HB NFCT DS VIR RESP RNA 4 TRGT: CPT | Performed by: EMERGENCY MEDICINE

## 2024-02-13 PROCEDURE — 70450 CT HEAD/BRAIN W/O DYE: CPT

## 2024-02-13 PROCEDURE — 36415 COLL VENOUS BLD VENIPUNCTURE: CPT | Performed by: EMERGENCY MEDICINE

## 2024-02-13 PROCEDURE — 99223 1ST HOSP IP/OBS HIGH 75: CPT | Performed by: PHYSICIAN ASSISTANT

## 2024-02-13 PROCEDURE — G1004 CDSM NDSC: HCPCS

## 2024-02-13 PROCEDURE — 84443 ASSAY THYROID STIM HORMONE: CPT | Performed by: EMERGENCY MEDICINE

## 2024-02-13 PROCEDURE — 99285 EMERGENCY DEPT VISIT HI MDM: CPT | Performed by: EMERGENCY MEDICINE

## 2024-02-13 PROCEDURE — 85025 COMPLETE CBC W/AUTO DIFF WBC: CPT | Performed by: EMERGENCY MEDICINE

## 2024-02-13 PROCEDURE — 93005 ELECTROCARDIOGRAM TRACING: CPT

## 2024-02-13 RX ORDER — ONDANSETRON 2 MG/ML
4 INJECTION INTRAMUSCULAR; INTRAVENOUS EVERY 6 HOURS PRN
Status: DISCONTINUED | OUTPATIENT
Start: 2024-02-13 | End: 2024-02-16 | Stop reason: HOSPADM

## 2024-02-13 RX ORDER — ACETAMINOPHEN 325 MG/1
650 TABLET ORAL EVERY 4 HOURS PRN
Status: DISCONTINUED | OUTPATIENT
Start: 2024-02-13 | End: 2024-02-16 | Stop reason: HOSPADM

## 2024-02-13 RX ORDER — ENOXAPARIN SODIUM 100 MG/ML
40 INJECTION SUBCUTANEOUS DAILY
Status: DISCONTINUED | OUTPATIENT
Start: 2024-02-14 | End: 2024-02-13

## 2024-02-13 RX ORDER — PRAVASTATIN SODIUM 40 MG
40 TABLET ORAL
Status: DISCONTINUED | OUTPATIENT
Start: 2024-02-13 | End: 2024-02-16 | Stop reason: HOSPADM

## 2024-02-13 NOTE — ASSESSMENT & PLAN NOTE
Malnutrition Findings:                                 BMI Findings:           Body mass index is 18.13 kg/m².     Consult nutrition services

## 2024-02-13 NOTE — H&P
[facility]  H&P  Name: Thomas Chase 83 y.o. male I MRN: [id number]  Unit/Bed#: -01 I Date of Admission: 2/13/2024   Date of Service: 2/13/2024 I Hospital Day: 0      Assessment/Plan   * Debility  Assessment & Plan  Patient with difficulty walking today at home  CT head: Thin subdural density measuring 3 mm in the left frontal region unchanged, previously attributed to subdural hematoma but could represent simple dural thickening given the chronicity.   CXR: No acute cardiopulmonary disease.   COVID/Flu/RSV- negative  PT/OT consult  CM for likely STR placement    SDH (subdural hematoma) (HCC)  Assessment & Plan  Patient fell in March 2022 and noted to have a small SDH. On repeat outpatient CT head on 5/2722 found to have new complex mixed density L sided holo hemisphere subdural hematomas, new from prior 5/10/22  The patient was sent to the ER for neurosurgical evaluation   The patient was taken to OR 5/28/22 for L craniotomy with SD drain placement x 2  CT head (2/13): Thin subdural density measuring 3 mm in the left frontal region unchanged, previously attributed to subdural hematoma but could represent simple dural thickening given the chronicity.      Moderate protein-calorie malnutrition (HCC)  Assessment & Plan  Malnutrition Findings:                                 BMI Findings:           Body mass index is 18.13 kg/m².     Consult nutrition services     Hearing loss  Assessment & Plan  Patient has a Left cochlear implant.     Hypercholesterolemia  Assessment & Plan  Continue statin therapy           VTE Pharmacologic Prophylaxis:   Moderate Risk (Score 3-4) - Pharmacological DVT Prophylaxis Contraindicated. Sequential Compression Devices Ordered.  Code Status: Level 3 - DNAR and DNI   Discussion with family: Updated  (wife) at bedside.    Anticipated Length of Stay: Patient will be admitted on an observation basis with an anticipated length of stay of less than  2 midnights secondary to PT/OT evaluation for likely placement to STR.    Total Time Spent on Date of Encounter in care of patient: 65 mins. This time was spent on one or more of the following: performing physical exam; counseling and coordination of care; obtaining or reviewing history; documenting in the medical record; reviewing/ordering tests, medications or procedures; communicating with other healthcare professionals and discussing with patient's family/caregivers.    Chief Complaint: difficulty standing    History of Present Illness:  Thomas Chase is a 83 y.o. male with a PMH of hyperlipidemia, hx of SDH who presents with difficulty standing. History was obtained from the patient's wife at the bedside. She states overall he's been slowly declining. She states he's been slowly losing weight over time even thought she tries to get him to eat. She states this morning she got up and made coffee in the kitchen and the patient will usually join her in the kitchen however he didn't which prompted her to check on him. She states she found him sitting in the bathroom and wasn't able to stand up by himself. She states she wasn't able to help him stand up either. This prompted them to come to the ED for evaluation as she's been having trouble taking care of him. She states he will be watching TV with her and then get up and go to bed even thought the program is not over. She denies him reporting any pain. She denies any fevers at home.     Review of Systems:  Review of Systems   Unable to perform ROS: Other ([employer] [place], difficulty with speech)       Past Medical and Surgical History:   Past Medical History:   Diagnosis Date    Arthritis     Encounter for general adult medical examination without abnormal findings 03/20/2019    Fall 11/03/2022    Hyperlipidemia     Macular degeneration, wet (HCC)     Urinary retention 06/02/2022       Past Surgical History:   Procedure Laterality Date    BRAIN HEMATOMA EVACUATION  Left 05/28/2022    Procedure: left CRANIOTOMY FOR SUBDURAL HEMATOMA;  Surgeon: Chris Watts MD;  Location: [facility];  Service: Neurosurgery    CARPAL TUNNEL RELEASE      HERNIA REPAIR Right     inguinal    IR CEREBRAL ANGIOGRAPHY / INTERVENTION  06/02/2022    NM COCHLEAR DEVICE IMPLANTATION W/WO MASTOIDECTOMY Left 1/17/2023    Procedure: LEFT COCHLEAR IMPLANTATION WITH MASTOIDECTOMY AND FACIAL NERVE MONITORING WITH AUDIOMETRIC TESTING OF THE IMPLANT;  Surgeon: Susie Tuttle MD;  Location: [facility];  Service: ENT    NM NEUROPLASTY &/TRANSPOS MEDIAN NRV CARPAL TUNNE Right 09/20/2021    Procedure: RELEASE CARPAL TUNNEL;  Surgeon: Bruno Segovia MD;  Location: [facility];  Service: Orthopedics       Meds/Allergies:  Prior to Admission medications    Medication Sig Start Date End Date Taking? Authorizing Provider   Multiple Vitamin (MULTIVITAMIN) tablet Take by mouth daily    Yes Historical Provider, MD   Multiple Vitamins-Minerals (PRESERVISION AREDS 2 PO) Take by mouth   Yes Historical Provider, MD   simvastatin (ZOCOR) 20 mg tablet Take 1 tablet (20 mg total) by mouth daily at bedtime 5/17/22  Yes Enrico Elizabeth DO   acetaminophen (TYLENOL) 325 mg tablet Take 2 tablets (650 mg total) by mouth every 6 (six) hours as needed for mild pain 6/20/22   Ashley Depadua, MD   loratadine (CLARITIN) 10 mg tablet Take 1 tablet (10 mg total) by mouth daily  Patient not taking: Reported on 2/13/2024 9/28/23   RONY Diaz     I have reviewed home medications with patient family member.    Allergies: No Known Allergies    Social History:  Marital Status: /Civil Union   Occupation: retired   Patient Pre-hospital Living Situation: Home  Patient Pre-hospital Level of Mobility: walks  Patient Pre-hospital Diet Restrictions: none  Substance Use History:   Social History     Substance and Sexual Activity   Alcohol Use Yes    Alcohol/week: 3.0 standard drinks of alcohol    Types: 3 Cans of beer per week      Social History     Tobacco Use   Smoking Status Former   Smokeless Tobacco Never   Tobacco Comments    Smoked as a teenager     Social History     Substance and Sexual Activity   Drug Use No       Family History:  History reviewed. No pertinent family history.    Physical Exam:     Vitals:   Blood Pressure: 124/72 (02/13/24 1746)  Pulse: 62 (02/13/24 1746)  Temperature: 98.5 °F (36.9 °C) (02/13/24 1746)  Temp Source: Temporal (02/13/24 1410)  Respirations: 16 (02/13/24 1746)  Weight - Scale: 59 kg (130 lb) (02/13/24 1407)  SpO2: 97 % (02/13/24 1746)    Physical Exam  Vitals and nursing note reviewed.   Constitutional:       General: He is awake.      Appearance: He is cachectic.   HENT:      Head: Atraumatic.   Cardiovascular:      Rate and Rhythm: Normal rate and regular rhythm.      Heart sounds: Normal heart sounds.   Pulmonary:      Effort: Pulmonary effort is normal.      Breath sounds: Normal breath sounds.   Abdominal:      General: Abdomen is flat.      Palpations: Abdomen is soft.      Tenderness: There is no abdominal tenderness.   Skin:     General: Skin is warm and dry.   Neurological:      General: No focal deficit present.      Mental Status: He is alert. Mental status is at baseline.   Psychiatric:         Attention and Perception: Attention normal.         Mood and Affect: Mood normal.         Behavior: Behavior is cooperative.      Comments: Patient's speech difficult to understand due to hearing loss. This is patient's baseline per wife at bedside          Additional Data:     Lab Results:  Results from last 7 days   Lab Units 02/13/24  1443   WBC Thousand/uL 5.49   HEMOGLOBIN g/dL 12.9   HEMATOCRIT % 39.3   PLATELETS Thousands/uL 405*   NEUTROS PCT % 77*   LYMPHS PCT % 12*   MONOS PCT % 11   EOS PCT % 0     Results from last 7 days   Lab Units 02/13/24  1443   SODIUM mmol/L 137   POTASSIUM mmol/L 4.2   CHLORIDE mmol/L 103   CO2 mmol/L 22   BUN mg/dL 24   CREATININE mg/dL 0.67   ANION GAP  mmol/L 12   CALCIUM mg/dL 9.4   GLUCOSE RANDOM mg/dL 151*                       Lines/Drains:  Invasive Devices       Peripheral Intravenous Line  Duration             Peripheral IV 02/13/24 Right Hand <1 day                        Imaging: Reviewed radiology reports from this admission including: chest xray and CT head  XR chest 1 view portable   ED Interpretation by Chris Blum DO (02/13 1448)   No acute cardio/pulmonary disease noted on my interpretation          Final Result by Enrico King MD (02/13 1546)      No acute cardiopulmonary disease.            Workstation performed: [id number]         CT head without contrast   Final Result by Daniel Toro MD (02/13 1530)      Thin subdural density measuring 3 mm in the left frontal region unchanged, previously attributed to subdural hematoma but could represent simple dural thickening given the chronicity.                  Workstation performed: [id number]               ** Please Note: This note has been constructed using a voice recognition system. **

## 2024-02-13 NOTE — PLAN OF CARE
Problem: NEUROSENSORY - ADULT  Goal: Achieves stable or improved neurological status  Description: INTERVENTIONS  - Monitor and report changes in neurological status  - Monitor vital signs such as temperature, blood pressure, glucose, and any other labs ordered   - Initiate measures to prevent increased intracranial pressure  - Monitor for seizure activity and implement precautions if appropriate      Outcome: Progressing  Goal: Remains free of injury related to seizures activity  Description: INTERVENTIONS  - Maintain airway, patient safety  and administer oxygen as ordered  - Monitor patient for seizure activity, document and report duration and description of seizure to physician/advanced practitioner  - If seizure occurs,  ensure patient safety during seizure  - Reorient patient post seizure  - Seizure pads on all 4 side rails  - Instruct patient/family to notify RN of any seizure activity including if an aura is experienced  - Instruct patient/family to call for assistance with activity based on nursing assessment  - Administer anti-seizure medications if ordered    Outcome: Progressing  Goal: Achieves maximal functionality and self care  Description: INTERVENTIONS  - Monitor swallowing and airway patency with patient fatigue and changes in neurological status  - Encourage and assist patient to increase activity and self care.   - Encourage visually impaired, hearing impaired and aphasic patients to use assistive/communication devices  Outcome: Progressing     Problem: MUSCULOSKELETAL - ADULT  Goal: Maintain or return mobility to safest level of function  Description: INTERVENTIONS:  - Assess patient's ability to carry out ADLs; assess patient's baseline for ADL function and identify physical deficits which impact ability to perform ADLs (bathing, care of mouth/teeth, toileting, grooming, dressing, etc.)  - Assess/evaluate cause of self-care deficits   - Assess range of motion  - Assess patient's mobility  -  Assess patient's need for assistive devices and provide as appropriate  - Encourage maximum independence but intervene and supervise when necessary  - Involve family in performance of ADLs  - Assess for home care needs following discharge   - Consider OT consult to assist with ADL evaluation and planning for discharge  - Provide patient education as appropriate  Outcome: Progressing  Goal: Maintain proper alignment of affected body part  Description: INTERVENTIONS:  - Support, maintain and protect limb and body alignment  - Provide patient/ family with appropriate education  Outcome: Progressing

## 2024-02-13 NOTE — ASSESSMENT & PLAN NOTE
Patient with difficulty walking today at home  CT head: Thin subdural density measuring 3 mm in the left frontal region unchanged, previously attributed to subdural hematoma but could represent simple dural thickening given the chronicity.   CXR: No acute cardiopulmonary disease.   COVID/Flu/RSV- negative  PT/OT consult  CM for likely STR placement

## 2024-02-13 NOTE — ED PROVIDER NOTES
History  Chief Complaint   Patient presents with    Weakness - Generalized     Patient arrives with family due to bilateral leg weakness that started today.      82 yo male presenting to the ed for reports of worsening weakness and inability to ambulate this morning which is very abnormal per wife and son. Per wife patient has been having an issue with worsening mentation and changes from baseline in that he will just sort of run away to the bedroom if something upsets him which he never did before and has been having bowel movements without telling anyone and making a mess of the bathroom which he also never did before. Per wife and son they found him today approximately about 5 hours prior to arrival on the commode but unable to walk under his own power which is very abnormal. Family state that he really started to have the initial downward trend in his health after a subdural hematoma about 18 months ago after falling down stairs.         Prior to Admission Medications   Prescriptions Last Dose Informant Patient Reported? Taking?   Multiple Vitamin (MULTIVITAMIN) tablet 2/13/2024 Spouse/Significant Other Yes Yes   Sig: Take by mouth daily    Multiple Vitamins-Minerals (PRESERVISION AREDS 2 PO) 2/13/2024 Spouse/Significant Other Yes Yes   Sig: Take by mouth   acetaminophen (TYLENOL) 325 mg tablet  Spouse/Significant Other No No   Sig: Take 2 tablets (650 mg total) by mouth every 6 (six) hours as needed for mild pain   loratadine (CLARITIN) 10 mg tablet Not Taking Spouse/Significant Other No No   Sig: Take 1 tablet (10 mg total) by mouth daily   Patient not taking: Reported on 2/13/2024   simvastatin (ZOCOR) 20 mg tablet 2/13/2024 Spouse/Significant Other No Yes   Sig: Take 1 tablet (20 mg total) by mouth daily at bedtime      Facility-Administered Medications: None       Past Medical History:   Diagnosis Date    Arthritis     Encounter for general adult medical examination without abnormal findings 03/20/2019     Fall 11/03/2022    Hyperlipidemia     Macular degeneration, wet (HCC)     Urinary retention 06/02/2022       Past Surgical History:   Procedure Laterality Date    BRAIN HEMATOMA EVACUATION Left 05/28/2022    Procedure: left CRANIOTOMY FOR SUBDURAL HEMATOMA;  Surgeon: Chris Watts MD;  Location:  MAIN OR;  Service: Neurosurgery    CARPAL TUNNEL RELEASE      HERNIA REPAIR Right     inguinal    IR CEREBRAL ANGIOGRAPHY / INTERVENTION  06/02/2022    [place] COCHLEAR DEVICE IMPLANTATION W/WO MASTOIDECTOMY Left 1/17/2023    Procedure: LEFT COCHLEAR IMPLANTATION WITH MASTOIDECTOMY AND FACIAL NERVE MONITORING WITH AUDIOMETRIC TESTING OF THE IMPLANT;  Surgeon: Susie Tuttle MD;  Location:  MAIN OR;  Service: ENT    WV NEUROPLASTY &/TRANSPOS MEDIAN NRV CARPAL TUNNE Right 09/20/2021    Procedure: RELEASE CARPAL TUNNEL;  Surgeon: Bruno Segovia MD;  Location: [facility];  Service: Orthopedics       History reviewed. No pertinent family history.  I have reviewed and agree with the history as documented.    E-Cigarette/Vaping    E-Cigarette Use Never User      E-Cigarette/Vaping Substances     Social History     Tobacco Use    Smoking status: Former    Smokeless tobacco: Never    Tobacco comments:     Smoked as a teenager   Vaping Use    Vaping status: Never Used   Substance Use Topics    Alcohol use: Yes     Alcohol/week: 3.0 standard drinks of alcohol     Types: 3 Cans of beer per week    Drug use: No       Review of Systems   Musculoskeletal:  Positive for gait problem.   Neurological:  Positive for weakness.   All other systems reviewed and are negative.      Physical Exam  Physical Exam  Vitals and nursing note reviewed.   Constitutional:       General: He is not in acute distress.     Appearance: He is well-developed. He is not diaphoretic.   HENT:      Head: Normocephalic and atraumatic.      Right Ear: External ear normal.      Left Ear: External ear normal.      Nose: Nose normal.   Eyes:      General: No  scleral icterus.        Right eye: No discharge.         Left eye: No discharge.      Conjunctiva/sclera: Conjunctivae normal.   Cardiovascular:      Rate and Rhythm: Normal rate and regular rhythm.      Heart sounds: Normal heart sounds. No murmur heard.     No friction rub. No gallop.   Pulmonary:      Effort: Pulmonary effort is normal. No respiratory distress.      Breath sounds: Normal breath sounds. No wheezing or rales.   Abdominal:      General: Bowel sounds are normal. There is no distension.      Palpations: Abdomen is soft. There is no mass.      Tenderness: There is no abdominal tenderness. There is no guarding.   Musculoskeletal:         General: No tenderness or deformity. Normal range of motion.      Cervical back: Normal range of motion and neck supple.   Skin:     General: Skin is warm and dry.      Capillary Refill: Capillary refill takes less than 2 seconds.      Coloration: Skin is not pale.      Findings: No erythema or rash.   Neurological:      General: No focal deficit present.      Mental Status: He is alert.      Comments: Patient grossly aphasic on examination which family status is baseline.  Independently moves all extremities however does appear to have some mild weakness in the lower extremities.   Psychiatric:         Behavior: Behavior normal.         Thought Content: Thought content normal.         Judgment: Judgment normal.         Vital Signs  ED Triage Vitals   Temperature Pulse Respirations Blood Pressure SpO2   02/13/24 1410 02/13/24 1407 02/13/24 1407 02/13/24 1407 02/13/24 1407   97.8 °F (36.6 °C) 78 18 129/64 96 %      Temp Source Heart Rate Source Patient Position - Orthostatic VS BP Location FiO2 (%)   02/13/24 1410 02/13/24 1407 02/13/24 1407 02/13/24 1407 --   Temporal Monitor Sitting Left arm       Pain Score       02/13/24 1800       No Pain           Vitals:    02/13/24 1407 02/13/24 1746 02/13/24 2153 02/14/24 0654   BP: 129/64 124/72 110/63 117/71   Pulse: 78 62 71  69   Patient Position - Orthostatic VS: Sitting            Visual Acuity  Visual Acuity      Flowsheet Row Most Recent Value   L Pupil Size (mm) 3   R Pupil Size (mm) 3   L Pupil Shape Round   R Pupil Shape Round            ED Medications  Medications   acetaminophen (TYLENOL) tablet 650 mg (has no administration in time range)   ondansetron (ZOFRAN) injection 4 mg (has no administration in time range)   pravastatin (PRAVACHOL) tablet 40 mg (40 mg Oral Not Given 2/13/24 1849)   multivitamin-minerals (CENTRUM) tablet 1 tablet (has no administration in time range)   multivitamin stress formula tablet 1 tablet (has no administration in time range)       Diagnostic Studies  Results Reviewed       Procedure Component Value Units Date/Time    Comprehensive metabolic panel [[id number]]  (Abnormal) Collected: 02/14/24 0430    Lab Status: Final result Specimen: Blood from Arm, Left Updated: 02/14/24 0502     Sodium 139 mmol/L      Potassium 4.1 mmol/L      Chloride 104 mmol/L      CO2 27 mmol/L      ANION GAP 8 mmol/L      BUN 15 mg/dL      Creatinine 0.56 mg/dL      Glucose 93 mg/dL      Calcium 9.2 mg/dL      AST 42 U/L      ALT 23 U/L      Alkaline Phosphatase 87 U/L      Total Protein 6.7 g/dL      Albumin 3.8 g/dL      Total Bilirubin 0.55 mg/dL      eGFR 94 ml/min/1.73sq m     Narrative:      National Kidney Disease Foundation guidelines for Chronic Kidney Disease (CKD):     Stage 1 with normal or high GFR (GFR > 90 mL/min/1.73 square meters)    Stage 2 Mild CKD (GFR = 60-89 mL/min/1.73 square meters)    Stage 3A Moderate CKD (GFR = 45-59 mL/min/1.73 square meters)    Stage 3B Moderate CKD (GFR = 30-44 mL/min/1.73 square meters)    Stage 4 Severe CKD (GFR = 15-29 mL/min/1.73 square meters)    Stage 5 End Stage CKD (GFR <15 mL/min/1.73 square meters)  Note: GFR calculation is accurate only with a steady state creatinine    Magnesium [[id number]]  (Normal) Collected: 02/14/24 0430    Lab Status: Final result Specimen:  Blood from Arm, Left Updated: 02/14/24 0502     Magnesium 2.2 mg/dL     Phosphorus [[id number]]  (Normal) Collected: 02/14/24 0430    Lab Status: Final result Specimen: Blood from Arm, Left Updated: 02/14/24 0502     Phosphorus 3.1 mg/dL     CBC (With Platelets) [[id number]]  (Abnormal) Collected: 02/14/24 0430    Lab Status: Final result Specimen: Blood from Arm, Left Updated: 02/14/24 0440     WBC 6.28 Thousand/uL      RBC 4.34 Million/uL      Hemoglobin 13.3 g/dL      Hematocrit 42.2 %      MCV 97 fL      MCH 30.6 pg      MCHC 31.5 g/dL      RDW 13.9 %      Platelets 429 Thousands/uL      MPV 9.1 fL     UA w Reflex to Microscopic w Reflex to Culture [[id number]]  (Normal) Collected: 02/13/24 1932    Lab Status: Final result Specimen: Urine, Clean Catch Updated: 02/13/24 1940     Color, UA Yellow     Clarity, UA Clear     Specific Gravity, UA 1.020     pH, UA 6.5     Leukocytes, UA Negative     Nitrite, UA Negative     Protein, UA Negative mg/dl      Glucose, UA Negative mg/dl      Ketones, UA Negative mg/dl      Urobilinogen, UA 1.0 E.U./dl      Bilirubin, UA Negative     Occult Blood, UA Negative    FLU/RSV/COVID - if FLU/RSV clinically relevant [[id number]]  (Normal) Collected: 02/13/24 1610    Lab Status: Final result Specimen: Nares from Nose Updated: 02/13/24 1651     SARS-CoV-2 Negative     INFLUENZA A PCR Negative     INFLUENZA B PCR Negative     RSV PCR Negative    Narrative:      FOR PEDIATRIC PATIENTS - copy/paste COVID Guidelines URL to browser: [email or web address]    SARS-CoV-2 assay is a Nucleic Acid Amplification assay intended for the  qualitative detection of nucleic acid from SARS-CoV-2 in nasopharyngeal  swabs. Results are for the presumptive identification of SARS-CoV-2 RNA.    Positive results are indicative of infection with SARS-CoV-2, the virus  causing COVID-19, but do not rule out bacterial infection or co-infection  with other viruses.  Laboratories within the [place] and its  territories are required to report all positive results to the appropriate  public health authorities. Negative results do not preclude SARS-CoV-2  infection and should not be used as the sole basis for treatment or other  patient management decisions. Negative results must be combined with  clinical observations, patient history, and epidemiological information.  This test has not been FDA cleared or approved.    This test has been authorized by FDA under an Emergency Use Authorization  (EUA). This test is only authorized for the duration of time the  declaration that circumstances exist justifying the authorization of the  emergency use of an in vitro diagnostic tests for detection of SARS-CoV-2  virus and/or diagnosis of COVID-19 infection under section 564(b)(1) of  the Act, 21 U.S.C. 360bbb-3(b)(1), unless the authorization is terminated  or revoked sooner. The test has been validated but independent review by FDA  and CLIA is pending.    Test performed using [employer] GeneXpert: This RT-PCR assay targets N2,  a region unique to SARS-CoV-2. A conserved region in the E-gene was chosen  for pan-Sarbecovirus detection which includes SARS-CoV-2.    According to CMS-2020-01-R, this platform meets the definition of high-throughput technology.    Basic metabolic panel [[id number]]  (Abnormal) Collected: 02/13/24 1443    Lab Status: Final result Specimen: Blood from Hand, Right Updated: 02/13/24 1607     Sodium 137 mmol/L      Potassium 4.2 mmol/L      Chloride 103 mmol/L      CO2 22 mmol/L      ANION GAP 12 mmol/L      BUN 24 mg/dL      Creatinine 0.67 mg/dL      Glucose 151 mg/dL      Calcium 9.4 mg/dL      eGFR 88 ml/min/1.73sq m     Narrative:      National Kidney Disease Foundation guidelines for Chronic Kidney Disease (CKD):     Stage 1 with normal or high GFR (GFR > 90 mL/min/1.73 square meters)    Stage 2 Mild CKD (GFR = 60-89 mL/min/1.73 square meters)    Stage 3A  Moderate CKD (GFR = 45-59 mL/min/1.73 square meters)    Stage 3B Moderate CKD (GFR = 30-44 mL/min/1.73 square meters)    Stage 4 Severe CKD (GFR = 15-29 mL/min/1.73 square meters)    Stage 5 End Stage CKD (GFR <15 mL/min/1.73 square meters)  Note: GFR calculation is accurate only with a steady state creatinine    TSH, 3rd generation with Free T4 reflex [[id number]]  (Normal) Collected: 02/13/24 1443    Lab Status: Final result Specimen: Blood from Hand, Right Updated: 02/13/24 1607     TSH 3RD GENERATON 2.422 uIU/mL     CBC and differential [[id number]]  (Abnormal) Collected: 02/13/24 1443    Lab Status: Final result Specimen: Blood from Hand, Right Updated: 02/13/24 1449     WBC 5.49 Thousand/uL      RBC 4.05 Million/uL      Hemoglobin 12.9 g/dL      Hematocrit 39.3 %      MCV 97 fL      MCH 31.9 pg      MCHC 32.8 g/dL      RDW 13.6 %      MPV 9.1 fL      Platelets 405 Thousands/uL      nRBC 0 /100 WBCs      Neutrophils Relative 77 %      Immat GRANS % 0 %      Lymphocytes Relative 12 %      Monocytes Relative 11 %      Eosinophils Relative 0 %      Basophils Relative 0 %      Neutrophils Absolute 4.23 Thousands/µL      Immature Grans Absolute 0.01 Thousand/uL      Lymphocytes Absolute 0.63 Thousands/µL      Monocytes Absolute 0.61 Thousand/µL      Eosinophils Absolute 0.00 Thousand/µL      Basophils Absolute 0.01 Thousands/µL                    XR chest 1 view portable   ED Interpretation by Chris Blum DO (02/13 1448)   No acute cardio/pulmonary disease noted on my interpretation          Final Result by Enrico King MD (02/13 1546)      No acute cardiopulmonary disease.            Workstation performed: [id number]         CT head without contrast   Final Result by Daniel Toro MD (02/13 1530)      Thin subdural density measuring 3 mm in the left frontal region unchanged, previously attributed to subdural hematoma but could represent simple dural thickening given the chronicity.                   Workstation performed: [id number]                    Procedures  ECG 12 Lead Documentation Only    Date/Time: 2/13/2024 2:41 PM    Performed by: Chris Blum DO  Authorized by: Chris Blum DO    Interpretation:     Interpretation: abnormal    Rate:     ECG rate:  82    ECG rate assessment: normal    Rhythm:     Rhythm: sinus rhythm    Ectopy:     Ectopy: PAC    QRS:     QRS axis:  Normal    QRS intervals:  Wide  Conduction:     Conduction: abnormal      Abnormal conduction: complete RBBB    ST segments:     ST segments:  Normal  T waves:     T waves: normal             ED Course                               SBIRT 20yo+      Flowsheet Row Most Recent Value   Initial Alcohol Screen: US AUDIT-C     1. How often do you have a drink containing alcohol? 0 Filed at: 02/13/2024 1408   2. How many drinks containing alcohol do you have on a typical day you are drinking?  0 Filed at: 02/13/2024 1408   3a. Male UNDER 65: How often do you have five or more drinks on one occasion? 0 Filed at: 02/13/2024 1408   3b. FEMALE Any Age, or MALE 65+: How often do you have 4 or more drinks on one occassion? 0 Filed at: 02/13/2024 1408   Audit-C Score 0 Filed at: 02/13/2024 1408   KULWANT: How many times in the past year have you...    Used an illegal drug or used a prescription medication for non-medical reasons? Never Filed at: 02/13/2024 1408                      Medical Decision Making  83-year-old male presenting to the ED with family for reports of worsening mentation over the last 2 weeks and inability to ambulate today which is very abnormal from his baseline.  Patient with history of subdural hematoma CT scan ordered along with baseline blood work, EKG and urinalysis.  Patient's wife states that they have no equipment at home and it is only her and she is having difficulty taking care of him.    Patient signed out to Dr. Middleton pending BMP and admission for worsening debility and requiring assistance at home as  patient unable to care for himself and family unable to care for him in his current state.     Amount and/or Complexity of Data Reviewed  Labs: ordered.  Radiology: ordered and independent interpretation performed.    Risk  Decision regarding hospitalization.             Disposition  Final diagnoses:   Ambulatory dysfunction     Time reflects when diagnosis was documented in both MDM as applicable and the Disposition within this note       Time User Action Codes Description Comment    2/13/2024  5:07 PM Jose Middleton Add [R26.2] Ambulatory dysfunction     2/13/2024  5:39 PM [place] [R53.81] Debility     2/13/2024  5:39 PM [place] [E44.0] Moderate protein-calorie malnutrition (HCC)           ED Disposition       ED Disposition   Admit    Condition   Stable    Date/Time   Tue Feb 13, 2024 4036    Comment   Case was discussed with rach and the patient's admission status was agreed to be Admission Status: inpatient status to the service of Dr. loyd .               Follow-up Information    None         Current Discharge Medication List        CONTINUE these medications which have NOT CHANGED    Details   Multiple Vitamin (MULTIVITAMIN) tablet Take by mouth daily       Multiple Vitamins-Minerals (PRESERVISION AREDS 2 PO) Take by mouth      simvastatin (ZOCOR) 20 mg tablet Take 1 tablet (20 mg total) by mouth daily at bedtime  Qty: 90 tablet, Refills: 2    Associated Diagnoses: Hypercholesterolemia      acetaminophen (TYLENOL) 325 mg tablet Take 2 tablets (650 mg total) by mouth every 6 (six) hours as needed for mild pain  Refills: 0    Associated Diagnoses: Primary osteoarthritis of left knee      loratadine (CLARITIN) 10 mg tablet Take 1 tablet (10 mg total) by mouth daily  Qty: 30 tablet, Refills: 3    Associated Diagnoses: Allergy, initial encounter             No discharge procedures on file.    PDMP Review         Value Time User    PDMP Reviewed  Yes 1/17/2023  7:37 AM Susie Tuttle,  MD            ED Provider  Electronically Signed by             Chris Blum,   02/14/24 0728

## 2024-02-13 NOTE — ASSESSMENT & PLAN NOTE
Patient fell in March 2022 and noted to have a small SDH. On repeat outpatient CT head on 5/2722 found to have new complex mixed density L sided holo hemisphere subdural hematomas, new from prior 5/10/22  The patient was sent to the ER for neurosurgical evaluation   The patient was taken to OR 5/28/22 for L craniotomy with SD drain placement x 2  CT head (2/13): Thin subdural density measuring 3 mm in the left frontal region unchanged, previously attributed to subdural hematoma but could represent simple dural thickening given the chronicity.

## 2024-02-14 ENCOUNTER — TELEPHONE (OUTPATIENT)
Dept: PSYCHIATRY | Facility: CLINIC | Age: 84
End: 2024-02-14

## 2024-02-14 ENCOUNTER — APPOINTMENT (INPATIENT)
Dept: CT IMAGING | Facility: HOSPITAL | Age: 84
DRG: 555 | End: 2024-02-14
Payer: MEDICARE

## 2024-02-14 ENCOUNTER — APPOINTMENT (INPATIENT)
Dept: RADIOLOGY | Facility: HOSPITAL | Age: 84
DRG: 555 | End: 2024-02-14
Payer: MEDICARE

## 2024-02-14 PROBLEM — E43 SEVERE PROTEIN-CALORIE MALNUTRITION (HCC): Status: ACTIVE | Noted: 2024-02-14

## 2024-02-14 LAB
ALBUMIN SERPL BCP-MCNC: 3.8 G/DL (ref 3.5–5)
ALP SERPL-CCNC: 87 U/L (ref 34–104)
ALT SERPL W P-5'-P-CCNC: 23 U/L (ref 7–52)
ANION GAP SERPL CALCULATED.3IONS-SCNC: 8 MMOL/L
AST SERPL W P-5'-P-CCNC: 42 U/L (ref 13–39)
BILIRUB SERPL-MCNC: 0.55 MG/DL (ref 0.2–1)
BUN SERPL-MCNC: 15 MG/DL (ref 5–25)
CALCIUM SERPL-MCNC: 9.2 MG/DL (ref 8.4–10.2)
CHLORIDE SERPL-SCNC: 104 MMOL/L (ref 96–108)
CO2 SERPL-SCNC: 27 MMOL/L (ref 21–32)
CREAT SERPL-MCNC: 0.56 MG/DL (ref 0.6–1.3)
ERYTHROCYTE [DISTWIDTH] IN BLOOD BY AUTOMATED COUNT: 13.9 % (ref 11.6–15.1)
GFR SERPL CREATININE-BSD FRML MDRD: 94 ML/MIN/1.73SQ M
GLUCOSE SERPL-MCNC: 93 MG/DL (ref 65–140)
HCT VFR BLD AUTO: 42.2 % (ref 36.5–49.3)
HGB BLD-MCNC: 13.3 G/DL (ref 12–17)
MAGNESIUM SERPL-MCNC: 2.2 MG/DL (ref 1.9–2.7)
MCH RBC QN AUTO: 30.6 PG (ref 26.8–34.3)
MCHC RBC AUTO-ENTMCNC: 31.5 G/DL (ref 31.4–37.4)
MCV RBC AUTO: 97 FL (ref 82–98)
PHOSPHATE SERPL-MCNC: 3.1 MG/DL (ref 2.3–4.1)
PLATELET # BLD AUTO: 429 THOUSANDS/UL (ref 149–390)
PMV BLD AUTO: 9.1 FL (ref 8.9–12.7)
POTASSIUM SERPL-SCNC: 4.1 MMOL/L (ref 3.5–5.3)
PROT SERPL-MCNC: 6.7 G/DL (ref 6.4–8.4)
RBC # BLD AUTO: 4.34 MILLION/UL (ref 3.88–5.62)
SODIUM SERPL-SCNC: 139 MMOL/L (ref 135–147)
WBC # BLD AUTO: 6.28 THOUSAND/UL (ref 4.31–10.16)

## 2024-02-14 PROCEDURE — G0426 INPT/ED TELECONSULT50: HCPCS | Performed by: PSYCHIATRY & NEUROLOGY

## 2024-02-14 PROCEDURE — 99232 SBSQ HOSP IP/OBS MODERATE 35: CPT | Performed by: INTERNAL MEDICINE

## 2024-02-14 PROCEDURE — 84100 ASSAY OF PHOSPHORUS: CPT | Performed by: PHYSICIAN ASSISTANT

## 2024-02-14 PROCEDURE — 80053 COMPREHEN METABOLIC PANEL: CPT | Performed by: PHYSICIAN ASSISTANT

## 2024-02-14 PROCEDURE — 92611 MOTION FLUOROSCOPY/SWALLOW: CPT

## 2024-02-14 PROCEDURE — 92610 EVALUATE SWALLOWING FUNCTION: CPT

## 2024-02-14 PROCEDURE — 83735 ASSAY OF MAGNESIUM: CPT | Performed by: PHYSICIAN ASSISTANT

## 2024-02-14 PROCEDURE — 74230 X-RAY XM SWLNG FUNCJ C+: CPT

## 2024-02-14 PROCEDURE — 97163 PT EVAL HIGH COMPLEX 45 MIN: CPT

## 2024-02-14 PROCEDURE — 85027 COMPLETE CBC AUTOMATED: CPT | Performed by: PHYSICIAN ASSISTANT

## 2024-02-14 PROCEDURE — 72131 CT LUMBAR SPINE W/O DYE: CPT

## 2024-02-14 PROCEDURE — 97167 OT EVAL HIGH COMPLEX 60 MIN: CPT

## 2024-02-14 PROCEDURE — G1004 CDSM NDSC: HCPCS

## 2024-02-14 RX ORDER — SERTRALINE HYDROCHLORIDE 25 MG/1
25 TABLET, FILM COATED ORAL DAILY
Status: DISCONTINUED | OUTPATIENT
Start: 2024-02-14 | End: 2024-02-16 | Stop reason: HOSPADM

## 2024-02-14 RX ADMIN — SERTRALINE HYDROCHLORIDE 25 MG: 25 TABLET ORAL at 16:48

## 2024-02-14 RX ADMIN — Medication 1 TABLET: at 09:20

## 2024-02-14 RX ADMIN — PRAVASTATIN SODIUM 40 MG: 40 TABLET ORAL at 16:48

## 2024-02-14 NOTE — PROGRESS NOTES
[facility]  Progress Note  Name: Thomas Chase I  MRN: [id number]  Unit/Bed#: -01 I Date of Admission: 2/13/2024   Date of Service: 2/14/2024 I Hospital Day: 0    Assessment/Plan   * Debility  Assessment & Plan  Patient with difficulty walking and lower extremity weakness  CT head: Thin subdural density measuring 3 mm in the left frontal region unchanged, previously attributed to subdural hematoma but could represent simple dural thickening given the chronicity.   CXR: No acute cardiopulmonary disease.   COVID/Flu/RSV- negative  PT/OT consult pending  Will check lumbar spine CT  Family concern for possible component of depression.  Will consult psychiatry  CM for likely STR placement    Dysphagia  Assessment & Plan  Patient with history of dysphagia, barium swallow performed a few months ago  Will change to dysphagia level 3 diet  Swallow eval pending  Aspiration precautions    Moderate protein-calorie malnutrition (HCC)  Assessment & Plan  Body mass index is 17.19 kg/m².     Consult nutrition services     Hearing loss  Assessment & Plan  Patient has a Left cochlear implant.     SDH (subdural hematoma) (HCC)  Assessment & Plan  History of subdural hematoma  The patient was taken to OR 5/28/22 for L craniotomy with SD drain placement x 2  CT head (2/13): Thin subdural density measuring 3 mm in the left frontal region unchanged, previously attributed to subdural hematoma but could represent simple dural thickening given the chronicity  Will avoid anticoagulants for now    Hypercholesterolemia  Assessment & Plan  Continue statin therapy           VTE Pharmacologic Prophylaxis: VTE Score: 3 Moderate Risk (Score 3-4) - Pharmacological DVT Prophylaxis Contraindicated. Sequential Compression Devices Ordered.    Mobility:   Basic Mobility Inpatient Raw Score: 16  JH-HLM Goal: 5: Stand one or more mins  JH-HLM Achieved: 5: Stand (1 or more minutes)  HLM Goal achieved. Continue to encourage  appropriate mobility.    Patient Centered Rounds: I performed bedside rounds with nursing staff today.   Discussions with Specialists or Other Care Team Provider: yes    Education and Discussions with Family / Patient: Updated  (wife) at bedside.  Extensive discussion with patient's wife at bedside as well as patient's daughter Allegra over the phone regarding plan of care    Current Length of Stay: 0 day(s)  Current Patient Status: Inpatient   Certification Statement: The patient will continue to require additional inpatient hospital stay due to debility  Discharge Plan: Anticipate discharge in 48-72 hrs to rehab facility.    Code Status: Level 3 - DNAR and DNI    Subjective:   No overnight events noted.  Patient with difficulty swallowing.  Speech therapy recommending video swallow    Objective:     Vitals:   Temp (24hrs), Av.3 °F (36.8 °C), Min:97.8 °F (36.6 °C), Max:98.5 °F (36.9 °C)    Temp:  [97.8 °F (36.6 °C)-98.5 °F (36.9 °C)] 98.5 °F (36.9 °C)  HR:  [62-78] 69  Resp:  [16-18] 16  BP: (110-129)/(63-72) 117/71  SpO2:  [95 %-98 %] 95 %  Body mass index is 17.19 kg/m².     Input and Output Summary (last 24 hours):     Intake/Output Summary (Last 24 hours) at 2024 1000  Last data filed at 2024 0452  Gross per 24 hour   Intake 120 ml   Output 200 ml   Net -80 ml       Physical Exam:   Physical Exam  Constitutional:       General: He is not in acute distress.     Comments: Frail elderly male   HENT:      Head: Normocephalic and atraumatic.      Nose: Nose normal.      Mouth/Throat:      Mouth: Mucous membranes are moist.   Eyes:      Extraocular Movements: Extraocular movements intact.      Conjunctiva/sclera: Conjunctivae normal.   Cardiovascular:      Rate and Rhythm: Normal rate and regular rhythm.   Pulmonary:      Effort: Pulmonary effort is normal. No respiratory distress.   Abdominal:      Palpations: Abdomen is soft.      Tenderness: There is no abdominal tenderness.    Musculoskeletal:         General: Normal range of motion.      Cervical back: Normal range of motion and neck supple.      Comments: Generalized weakness   Skin:     General: Skin is warm and dry.   Neurological:      Mental Status: He is alert.      Comments: Patient awake and alert.  Following simple commands.   Psychiatric:         Mood and Affect: Mood normal.         Behavior: Behavior normal.          Additional Data:     Labs:  Results from last 7 days   Lab Units 02/14/24  0430 02/13/24  1443   WBC Thousand/uL 6.28 5.49   HEMOGLOBIN g/dL 13.3 12.9   HEMATOCRIT % 42.2 39.3   PLATELETS Thousands/uL 429* 405*   NEUTROS PCT %  --  77*   LYMPHS PCT %  --  12*   MONOS PCT %  --  11   EOS PCT %  --  0     Results from last 7 days   Lab Units 02/14/24  0430   SODIUM mmol/L 139   POTASSIUM mmol/L 4.1   CHLORIDE mmol/L 104   CO2 mmol/L 27   BUN mg/dL 15   CREATININE mg/dL 0.56*   ANION GAP mmol/L 8   CALCIUM mg/dL 9.2   ALBUMIN g/dL 3.8   TOTAL BILIRUBIN mg/dL 0.55   ALK PHOS U/L 87   ALT U/L 23   AST U/L 42*   GLUCOSE RANDOM mg/dL 93                       Lines/Drains:  Invasive Devices       Peripheral Intravenous Line  Duration             Peripheral IV 02/13/24 Right Hand <1 day                          Imaging: Reviewed radiology reports from this admission including: CT head    Recent Cultures (last 7 days):         Last 24 Hours Medication List:   Current Facility-Administered Medications   Medication Dose Route Frequency Provider Last Rate    acetaminophen  650 mg Oral Q4H PRN CHRISTIAN Wynn-C      multivitamin stress formula  1 tablet Oral Daily CHRISTIAN Wynn-C      multivitamin-minerals  1 tablet Oral Daily CHRISTIAN Wynn-LAUREN      ondansetron  4 mg Intravenous Q6H PRN [place] PA-C      pravastatin  40 mg Oral Daily With [place] PA-C          Today, Patient Was Seen By: Shaista Yu MD    **Please Note: This note may have been constructed using a voice  recognition system.**

## 2024-02-14 NOTE — MALNUTRITION/BMI
This medical record reflects one or more clinical indicators suggestive of malnutrition.    Malnutrition Findings:   Adult Malnutrition type: Chronic illness  Adult Degree of Malnutrition: Other severe protein calorie malnutrition  Malnutrition Characteristics: Fat loss, Muscle loss, Weight loss                360 Statement: Severe protein calorie malnutrition related to chronic illness as evidenced by muscle wasting to clavicle and temple regions, subcutaneous fat loss to orbital and buccal regions, and significant weight loss of 13.1% (19#) within 6 months. Treating with oral diet and nutrition supplements.    BMI Findings:  Adult BMI Classifications: Underweight < 18.5        Body mass index is 17.19 kg/m².     See Nutrition note dated 2/14/2024 for additional details.  Completed nutrition assessment is viewable in the nutrition documentation.

## 2024-02-14 NOTE — SPEECH THERAPY NOTE
Speech Language/Pathology  Speech/Language Pathology  Assessment    Patient Name: Thomas Chase  Today's Date: 2/14/2024     Problem List  Principal Problem:    Debility  Active Problems:    Hypercholesterolemia    SDH (subdural hematoma) (HCC)    Hearing loss    Moderate protein-calorie malnutrition (HCC)    Past Medical History  Past Medical History:   Diagnosis Date    Arthritis     Encounter for general adult medical examination without abnormal findings 03/20/2019    Fall 11/03/2022    Hyperlipidemia     Macular degeneration, wet (HCC)     Urinary retention 06/02/2022     Past Surgical History  Past Surgical History:   Procedure Laterality Date    BRAIN HEMATOMA EVACUATION Left 05/28/2022    Procedure: left CRANIOTOMY FOR SUBDURAL HEMATOMA;  Surgeon: Chris Watts MD;  Location:  MAIN OR;  Service: Neurosurgery    CARPAL TUNNEL RELEASE      HERNIA REPAIR Right     inguinal    IR CEREBRAL ANGIOGRAPHY / INTERVENTION  06/02/2022    VA COCHLEAR DEVICE IMPLANTATION W/WO MASTOIDECTOMY Left 1/17/2023    Procedure: LEFT COCHLEAR IMPLANTATION WITH MASTOIDECTOMY AND FACIAL NERVE MONITORING WITH AUDIOMETRIC TESTING OF THE IMPLANT;  Surgeon: Susie Tuttle MD;  Location:  MAIN OR;  Service: ENT    VA NEUROPLASTY &/TRANSPOS MEDIAN NRV CARPAL TUNNE Right 09/20/2021    Procedure: RELEASE CARPAL TUNNEL;  Surgeon: Bruno Segovia MD;  Location: [facility];  Service: Orthopedics      Bedside Swallow Evaluation:    Summary:  Pt presented w/  suspected mild to mod oral/pharyngeal dysphagia. Positioned upright and alert. Wife at beside. Pt fed self bread dessert, puree  and thin liquids via cup/straw. Mastication was mildly prolonged appeared min disorganized. Bolus control, and formation was WNL/WFL. Transfer and swallow initiation appeared delayed. Laryngeal rise upon palpation appeared adequate. Pt intermittent delayed cough following cup/straw sips of thin. Wet loud vocal quality following all sips of thin.  Per wife reported pt has been coughing often w/ thin liquids at home. Wife also reported poor intake and weight loss.     Pt has hx of mod severe cognitive deficits. He was recently d/c'd from OP ST therapy 1/18/2024 in which deficits were reported across all domains. Pt does have cochlear implant, however wife reported pt increased understanding from utilizing written instructions.      Recommendations:  Diet: Results pending VBS   Liquid:Results pending VBS  Positioning:Upright  Strategies:   Pt to take PO/Meds only when fully alert and upright.   Oral care:  Aspiration precautions  Reflux precautions  Therapy Prognosis:  Prognosis considerations:  Frequency: 2-5 times weekly as indicated       Consider consult w/:  Rehab  Nutrition    Goal(s):  Dysphagia LTG  -Patient will demonstrate safe and effective oral intake (without overt s/s significant oral/pharyngeal dysphagia including s/s penetration or aspiration) for the highest appropriate diet level.     1.Pt will tolerate least restrictive diet w/out s/s aspiration or oral/pharyngeal difficulties.   2.Pt will will effectively manipulate/masticate and transfer purees/solids w/out s/s dysphagia/aspiration.   3.Pt will tolerate thin liquids w/out s/s aspiration.   -If indicated, patient will comply with a Video/Modified Barium Swallow study for more complete assessment of swallowing anatomy/physiology/aspiration risk and to assess efficacy of treatment techniques so as to best guide treatment plan     H&P/Admit info/ pertinent provider notes: (PMH noted above)  Thomas Chase is a 83 y.o. male with a PMH of hyperlipidemia, hx of SDH who presents with difficulty standing. History was obtained from the patient's wife at the bedside. She states overall he's been slowly declining. She states he's been slowly losing weight over time even thought she tries to get him to eat. She states this morning she got up and made coffee in the kitchen and the patient will usually  join her in the kitchen however he didn't which prompted her to check on him. She states she found him sitting in the bathroom and wasn't able to stand up by himself. She states she wasn't able to help him stand up either. This prompted them to come to the ED for evaluation as she's been having trouble taking care of him. She states he will be watching TV with her and then get up and go to bed even thought the program is not over. She denies him reporting any pain. She denies any fevers at home       Special Studies:  XR chest portable (02/13/2024) Impression   No acute cardiopulmonary disease   CT head without contrast (02/13/2024) Impression  Thin subdural density measuring 3 mm in the left frontal region unchanged, previously attributed to subdural hematoma but could represent simple dural thickening given the chronicity       Procalcitonin: N/a   WBC: 6.28               02/14/2024     Code Status: Level 3 DN     Previous MBS:10/05/2023 Impression   Oral stage:  Pt presented with minimal oral stage dysphagia.  Mastication was timely and grossly effective with materials administered today. Bolus formation and transfer were functional.  Oral control appeared adequate with no gross premature spillage over the base of tongue.      Pharyngeal stage:  Pt presented with moderate pharyngeal dysphagia.  Velar elevation noted.  Swallowing initiation was prompt.  Laryngeal rise and anterior hyoid excursion appeared mildly weak.  Airway entrance closure appeared adequate.  Tongue base retraction is very weak, minimal BOT drive results in moderate-severe valleculae residue.  Pharyngeal constriction suspected to be mildly weak.  PES opening was adequate.       Strategies and Efficacy:  Small bites, small sips, slow rate, upright posture, multiple swallows per bolus, utilize liquid wash.     Aspiration Response and Efficacy:  All food, liquid and the barium tablet passed through the pharynx safely and efficiently (ie no  laryngeal penetration or aspiration).     Esophageal stage:  Brief view of esophagus was completed after administration of PO items, pt does demonstrate mild residue throughout the proximal esophageal segment, some bulging near C4-C5 creating curvature of the cervical spine toward an anterior position.     Assessment Summary:  Pt presents with mild-moderate oropharyngeal dysphagia characterized by very weak, uncoordinated, and decreased BOT drive resulting in severe valleculae retention that demonstrates minimal improvements with subsequent swallows and liquid wash.  Patient also demonstrates UES weakness/laxity resulting in trace amounts of residue during dense solids (sandwich).  Thin liquid wash yielded mild improvement in valleculae retention of solids, no penetration occurs though risk is present.  Epiglottis does cover airway completely but minimal tongue drive results in minimal changes to valleculae retention.  Patient would benefit from mechanical soft solids, thin liquids, strategies listed below and initiation of speech/swallow therapy for lingual strengthening.  Patient's tongue weakness influences his overall speech intelligibility, swallow safety and would increase his ability to communicate.    Patient's goal: none stated    Did the pt report pain? no  If yes, was nursing notified/was it addressed? N/a     Reason for consult:  R/o aspiration  Determine safest and least restrictive diet  Failed nursing dysphagia assessment  weight loss   h/o dysphagia     Precautions:  N/a    Food Allergies: No known    Current Diet: Regular  and thin liquids    Premorbid diet: Soft/bite sized  and thin liquids    O2 requirement: Room Air    Social/Prior living Lives with spouse    Voice/Speech: Hoarse, dysphonic, wet    Follows commands: Intermittent    Cognitive status: Alert      Oral [facility] exam:  Dentition:Adequate  Lips (VII): WNL  Tongue (XII):midline   Mandible (V):adequate   Face/oral sensation (V):WNL,  symmetrical   Velum (X):WNL    Items administered:  Banana bread, and thin liquids via cup/straw     Oral stage:  Lip closure:WNL  Mastication:mildly prolonged   Bolus formation: WNL/WFL  Bolus control:WNL/WFL  Bolus transfer: appeared delayed    Pharyngeal stage:  Swallow promptness: prompt  Laryngeal rise:adequate   Wet voice: present   Cough: wet following successive sips of thin  No overt s/s aspiration    Esophageal stage:  No s/s reported    Results d/w:  Pt, nursing, family, CRNP

## 2024-02-14 NOTE — PLAN OF CARE
Problem: PHYSICAL THERAPY ADULT  Goal: Performs mobility at highest level of function for planned discharge setting.  See evaluation for individualized goals.  Description: Treatment/Interventions: Functional transfer training, LE strengthening/ROM, Elevations, Therapeutic exercise, Endurance training, Cognitive reorientation, Patient/family training, Equipment eval/education, Bed mobility, Gait training, Spoke to nursing  Equipment Recommended:  (pt to benefit from RW trial next session)       See flowsheet documentation for full assessment, interventions and recommendations.  Note: Prognosis: Fair  Problem List: Decreased strength, Decreased endurance, Impaired balance, Decreased mobility, Decreased cognition, Impaired judgement, Decreased safety awareness, Impaired hearing  Assessment: Pt is 83 y.o. male seen for high-complexity PT evaluation on 2/14/2024 s/p admit to [facility] on 2/13/2024 w/ Debility. PT initially consulted to assess pt's functional mobility and d/c needs. Order placed for PT eval and tx, w/ up and OOB as tolerated order. Pt is questionable historian, per chart review pt lives with wife at home. At time of eval, pt requires SUP for supine>sit, min Ax1 for STS and amb x 5' x 3 trials. Upon evaluation, pt presenting with impaired functional mobility d/t decreased strength, decreased endurance, impaired balance, decreased mobility, decreased cognition, impaired judgement, decreased safety awareness, and impaired hearing. Pertinent PMHx and current co-morbidities affecting pt's physical performance at time of assessment include: SDH, hearing loss, moderate protein-calorie malnutrition, debility, arthritis, h/o fall, HLD. Personal factors affecting pt at time of eval include: positive fall history, hearing impairments, and increased age . Objective measures performed on IE also reveal limitations: AM-PAC 6-Clicks: 16/24. Pt's clinical presentation is currently unstable/unpredictable  seen in pt's presentation of need for input for task focus and mobility technique, ongoing medical assessment, and progressive decline prior to admission with reduced safety awareness . Overall, pt's rehab potential and prognosis to return to PLOF is fair as impacted by objective findings, warranting pt to receive further skilled PT interventions to address identified impairments, activity limitation(s), and participation restriction(s). Pt is to benefit from continued PT tx to address deficits as defined above and maximize level of functional independent mobility and consistency in order for pt to improve safety with mobility tasks. From PT/mobility standpoint, recommendation at time of d/c would be level 2, moderate resource intensity PT services, pending pt's overall functional progress in order to facilitate return to PLOF and abilities.  Barriers to Discharge: Inaccessible home environment (unknown pt's baseline)     Rehab Resource Intensity Level, PT: II (Moderate Resource Intensity)    See flowsheet documentation for full assessment.

## 2024-02-14 NOTE — CONSULTS
TeleConsultation - Behavioral Health   Thomas Chase 83 y.o. male MRN: [id number]  Unit/Bed#: -01 Encounter: [id number]        REQUIRED DOCUMENTATION:     1. This service was provided via Telemedicine.  2. Provider located at [place].  3. TeleMed provider: hSai Ngo MD.  4. Identify all parties in room with patient during tele consult:  Wife  5.Patient was then informed that this was a Telemedicine visit and that the exam was being conducted confidentially over secure lines. My office door was closed. No one else was in the room.  Patient acknowledged consent and understanding of privacy and security of the Telemedicine visit, and gave us permission to have the assistant stay in the room in order to assist with the history and to conduct the exam.  I informed the patient that I have reviewed their record in Epic and presented the opportunity for them to ask any questions regarding the visit today.  The patient agreed to participate.       Assessment/Plan     Principal Problem:    Debility  Active Problems:    Hypercholesterolemia    SDH (subdural hematoma) (HCC)    Hearing loss    Moderate protein-calorie malnutrition (HCC)    Dysphagia    Assessment:    Unspecified Depressive Disorder    Treatment Plan:    Check vitamin B12 level and start IM B12 injection if he has low B12.    Follow-up with outpatient psychiatrist      Planned Medication Changes:    Start Zoloft 25 mg once daily for depressive symptoms    Current Medications:     Current Facility-Administered Medications   Medication Dose Route Frequency Provider Last Rate    acetaminophen  650 mg Oral Q4H PRN [place] PA-C      multivitamin stress formula  1 tablet Oral Daily [place] PA-C      multivitamin-minerals  1 tablet Oral Daily [place] PA-C      ondansetron  4 mg Intravenous Q6H PRN [place] PA-C      pravastatin  40 mg Oral Daily With Dinner [place] PA-C         Other treatment modalities ordered as  indicated:    Psychotherapy      Inpatient consult to Psychiatry  Consult performed by: Shai Ngo MD  Consult ordered by: Shaista Yu MD        Physician Requesting Consult: Shaista Yu, *  Principal Problem:Debility    Reason for Consult: depressive symptoms      History of Present Illness      Patient is a 83 y.o. male with a history of no psychiatric illness in the past , with history of chronic subdural hematoma, who was admitted to the medical service on 2/13/2024 due to debility. Reportedly, patient has been compliance with medications and follow-up care.  Most of the information was obtained from the chart and directly from patient's wife and wife asked directly to the patient as the patient has hearing problem.  As per patient's wife , she started noticing change in his mood about a month ago , and she denied any significant changes in psychosocial stressors. Depressive symptoms includes: depressed mood most of the time, feeling of hopelessness, low energy and motivation, anhedonia, indecisiveness, decrease appetite, and weight loss. He has been not been caring for self,  increasingly getting isolated.  She reported that he has no interest of the activities which he was doing before. No recent aggressive behavior was reported.  No suicidal or homicidal ideation was reported.  No recent manic and psychotic symptoms was reported.  No substance abuse or alcohol abuse problem was reported.  Wife reports that he has dysphagia but about the year and which has been getting worse.        Psychiatric Review Of Systems:     Sleep changes: no  appetite changes: yes  weight changes: no  anxiety/panic: no  aracelis: no  guilty/hopeless: yes  self injurious behavior/risky behavior: no    Historical Information     Past Psychiatric History:     Psychiatric Hospitalizations: No history of past inpatient psychiatric admissions Outpatient Treatment History: Suicide Attempts:  No History of Suicidal  attempt reported History of self-harm: No History of self injurious behavior was reported Violence History: No History of physically aggressive  behavior was reported    Substance Abuse History:    E-Cigarette/Vaping    E-Cigarette Use Never User           Social History       Tobacco History       Smoking Status  Former      Smokeless Tobacco Use  Never      Tobacco Comments  Smoked as a teenager              Alcohol History       Alcohol Use Status  Yes Drinks/Week  3 Cans of beer per week Amount  3.0 standard drinks of alcohol/wk              Drug Use       Drug Use Status  No              Sexual Activity       Sexually Active  Not Asked              Activities of Daily Living    Not Asked                     Social History:    Social History       Social History     Socioeconomic History    Marital status: /Civil Union     Spouse name: Not on file    Number of children: Not on file    Years of education: Not on file    Highest education level: Not on file   Occupational History    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[employer]   Tobacco Use    Smoking status: Former    Smokeless tobacco: Never    Tobacco comments:     Smoked as a teenager   Vaping Use    Vaping status: Never Used   Substance and Sexual Activity    Alcohol use: Yes     Alcohol/week: 3.0 standard drinks of alcohol     Types: 3 Cans of beer per week    Drug use: No    Sexual activity: Not on file   Other Topics Concern    Not on file   Social History Narrative    Not on file     Social Determinants of Health     Financial Resource Strain: Low Risk  (9/1/2022)    Overall Financial Resource Strain (CARDIA)     Difficulty of Paying Living Expenses: Not hard at all   Food Insecurity: Not on file   Transportation Needs: No Transportation Needs (9/1/2022)    PRAPARE - Transportation     Lack of Transportation (Medical): No     Lack of Transportation (Non-Medical): No   Physical Activity: Not on file   Stress: Not on file   Social Connections: Not on file   Intimate  Partner Violence: Not on file   Housing Stability: Not on file         Past Medical History:    Past Medical History:   Diagnosis Date    Arthritis     Encounter for general adult medical examination without abnormal findings 03/20/2019    Fall 11/03/2022    Hyperlipidemia     Macular degeneration, wet (HCC)     Urinary retention 06/02/2022          Meds/Allergies     No Known Allergies  all current active meds have been reviewed    Medical Review Of Systems:    Review of Systems      Objective     Vital signs in last 24 hours:  Temp:  [98.5 °F (36.9 °C)] 98.5 °F (36.9 °C)  HR:  [62-71] 69  Resp:  [16-18] 16  BP: (110-124)/(63-72) 117/71      Intake/Output Summary (Last 24 hours) at 2/14/2024 1444  Last data filed at 2/14/2024 1233  Gross per 24 hour   Intake 480 ml   Output 200 ml   Net 280 ml       Mental Status Evaluation::    Appearance age appropriate, casually dressed   Behavior cooperative, calm   Speech delayed, soft, garbled   Mood dysphoric   Affect flat   Thought Processes slowing of thoughts   Associations intact associations   Thought Content no overt delusions   Perceptual Disturbances: no auditory hallucinations, no visual hallucinations   Abnormal Thoughts  Risk Potential Suicidal ideation - None  Homicidal ideation - None  Potential for aggression - No   Orientation oriented to person and place   Memory recent and remote memory grossly intact   Consciousness alert and awake   Attention Span Concentration Span attention span and concentration are age appropriate   Intellect appears to be of average intelligence   Insight limited   Judgement limited   Muscle Strength and  Gait No assessed   Motor Activity no abnormal movements                       Lab Results: I have personally reviewed all pertinent laboratory/tests results.     Most Recent Labs:   Lab Results   Component Value Date    WBC 6.28 02/14/2024    RBC 4.34 02/14/2024    HGB 13.3 02/14/2024    HCT 42.2 02/14/2024     (H) 02/14/2024     RDW 13.9 02/14/2024    NEUTROABS 4.23 02/13/2024     05/11/2018    SODIUM 139 02/14/2024    K 4.1 02/14/2024     02/14/2024    CO2 27 02/14/2024    BUN 15 02/14/2024    CREATININE 0.56 (L) 02/14/2024    CALCIUM 9.2 02/14/2024    AST 42 (H) 02/14/2024    ALT 23 02/14/2024    ALKPHOS 87 02/14/2024    TP 6.7 02/14/2024    TBILI 0.55 02/14/2024    CHOL 169 05/11/2018    CHOLESTEROL 187 10/24/2023    TRIG 40 10/24/2023    HDL 67 10/24/2023    LDLCALC 112 (H) 10/24/2023    NONHDLC 120 10/24/2023    RVS7FXQGFNXR 2.422 02/13/2024    FREET4 0.96 09/29/2023       Imaging Studies: FL barium swallow video w speech    Result Date: 2/14/2024  Narrative: A video barium swallow study was performed by the Department of Speech Pathology. Please refer to the report for the official interpretation. The images are stored for archival purposes only. Study images were not formally reviewed by the Radiology Department.    CT spine lumbar wo contrast    Result Date: 2/14/2024  Narrative: CT LUMBAR SPINE WITHOUT CONTRAST INDICATION:   Low back pain, increased fracture risk lower ext weakness. COMPARISON: None. TECHNIQUE:  Contiguous axial images through the lumbar spine were obtained. Sagittal and coronal reconstructions were performed. IV Contrast: No contrast Radiation dose length product (DLP) for this visit:  1390.12 mGy-cm .  This examination, like all CT scans performed in the [facility], was performed utilizing techniques to minimize radiation dose exposure, including the use of iterative reconstruction and automated exposure control. IMAGE QUALITY:  Diagnostic. FINDINGS: ALIGNMENT:  There are 5 lumbar type vertebral bodies.  Normal alignment of the lumbar spine.  No spondylolysis or spondylolisthesis. VERTEBRAE:  No fracture.  No suspicious lytic or blastic lesion. DEGENERATIVE CHANGES: Multilevel disc space narrowing with endplate degenerative changes as well as vacuum disc phenomenon at multiple  levels. Lower Thoracic spine: Degenerative disc disease and facet arthropathy. Mild foraminal stenosis at T10-11. No significant canal stenosis. L1-2: Mild disc bulge. No significant canal or foraminal stenosis. L2-3: Disc bulge and marginal osteophyte asymmetric to the right. Mild facet arthropathy. Mild indentation of the thecal sac without significant canal stenosis. Moderate foraminal stenosis.. L3-4: Disc bulge and marginal osteophyte with facet arthropathy. Mild canal stenosis. Moderate to severe foraminal stenosis. L4-5: Disc bulge and marginal osteophyte. Mild facet arthropathy. Mild indentation of the thecal sac without significant canal stenosis. Moderate right and severe left foraminal stenosis. L5-S1: Disc bulge and marginal osteophyte. Mild facet arthropathy. No significant canal stenosis. Moderate foraminal stenosis. PARASPINAL SOFT TISSUES:   Normal. Other: Right basilar atelectasis or scarring. Small bilateral hypodense renal lesions that are incompletely characterized but may represent cysts. Punctate nonobstructing left renal calcifications. Large amount of stool in the rectum.     Impression: No acute fracture. Degenerative spondylosis with mild canal stenosis at L3-4 and multilevel moderate to severe foraminal stenosis. Workstation performed: [id number]     XR chest 1 view portable    Result Date: 2/13/2024  Narrative: XR CHEST PORTABLE INDICATION: generalized weakness. COMPARISON: 11/10/2023 FINDINGS: Clear lungs. No pneumothorax or pleural effusion. Normal cardiomediastinal silhouette. Bones are unremarkable for age. Normal upper abdomen.     Impression: No acute cardiopulmonary disease. Workstation performed: [id number]     CT head without contrast    Result Date: 2/13/2024  Narrative: CT BRAIN - WITHOUT CONTRAST INDICATION:   hx opf subdural and now with significant change in that unable to walk and with change in mentation over last few weeks. COMPARISON: CT brain from 11/10/2023.  TECHNIQUE:  CT examination of the brain was performed.  Multiplanar 2D reformatted images were created from the source data. Radiation dose length product (DLP) for this visit:  922.9 mGy-cm .  This examination, like all CT scans performed in the [facility], was performed utilizing techniques to minimize radiation dose exposure, including the use of iterative reconstruction and automated exposure control. IMAGE QUALITY:  Diagnostic. FINDINGS: PARENCHYMA: Decreased attenuation is noted in periventricular and subcortical white matter demonstrating an appearance that is statistically most likely to represent moderate microangiopathic change; this appearance is similar when compared to most recent prior examination. No CT signs of acute infarction.  No intracranial mass, mass effect or midline shift.  No acute parenchymal hemorrhage. VENTRICLES AND EXTRA-AXIAL SPACES: Ventricles and extra-axial CSF spaces are prominent commensurate with the degree of volume loss.  No hydrocephalus.  No acute extra-axial hemorrhage. Thin subdural density measuring 3 mm in the left frontal region unchanged previously attributed to subdural hematoma but could represent simple dural thickening. VISUALIZED ORBITS: Normal visualized orbits. PARANASAL SINUSES: Normal visualized paranasal sinuses. CALVARIUM AND EXTRACRANIAL SOFT TISSUES: Left cochlear implant.     Impression: Thin subdural density measuring 3 mm in the left frontal region unchanged, previously attributed to subdural hematoma but could represent simple dural thickening given the chronicity. Workstation performed: [id number]     EKG/Pathology/Other Studies:   Lab Results   Component Value Date    VENTRATE 82 02/13/2024    ATRIALRATE 101 02/13/2024    PRINT 176 12/21/2022    QRSDINT 126 02/13/2024    QTINT 430 02/13/2024    QTCINT 502 02/13/2024    PAXIS 5 12/21/2022    QRSAXIS 61 02/13/2024    TWAVEAXIS 59 02/13/2024        Code Status: Level 3 - DNAR and  DNI  Advance Directive and Living Will:      Power of :    POLST:      Screenings:    1. Nutrition Screening  Nutrition Assessment (completed by Staff): Nutrition  Feeding: Needs set up    2. Pain Screening  Pain Screening: Pain Assessment  Pain Assessment Tool: 0-10  Pain Score: 0  Multiple Pain Sites: No    3. Suicide Screening                                                         [place]-SUICIDE SEVERITY RATING SCALE (C-SSRS)                            1. In the last month have you wished you were dead or wished you could go to sleep and not wake up? No  2. In the last month, have you actually had thoughts about killing yourself? No  6. Have you done anything, started to do anything, or prepared to do anything to end your life in the last 3 months? No  Suicide Risk Level : Low     Counseling / Coordination of Care:    Total floor / unit time spent today 50 minutes. Greater than 50% of total time was spent with the patient and / or family counseling and / or coordination of care. A description of the counseling / coordination of care: Direct Patient Care, Chart Review, and Documentation

## 2024-02-14 NOTE — ASSESSMENT & PLAN NOTE
Patient with difficulty walking and lower extremity weakness  CT head: Thin subdural density measuring 3 mm in the left frontal region unchanged, previously attributed to subdural hematoma but could represent simple dural thickening given the chronicity.   CXR: No acute cardiopulmonary disease.   COVID/Flu/RSV- negative  PT/OT consult pending  Will check lumbar spine CT  Family concern for possible component of depression.  Will consult psychiatry  CM for likely STR placement

## 2024-02-14 NOTE — PLAN OF CARE
Problem: PAIN - ADULT  Goal: Verbalizes/displays adequate comfort level or baseline comfort level  Description: Interventions:  - Encourage patient to monitor pain and request assistance  - Assess pain using appropriate pain scale  - Administer analgesics based on type and severity of pain and evaluate response  - Implement non-pharmacological measures as appropriate and evaluate response  - Consider cultural and social influences on pain and pain management  - Notify physician/advanced practitioner if interventions unsuccessful or patient reports new pain  Outcome: Progressing     Problem: INFECTION - ADULT  Goal: Absence or prevention of progression during hospitalization  Description: INTERVENTIONS:  - Assess and monitor for signs and symptoms of infection  - Monitor lab/diagnostic results  - Monitor all insertion sites, i.e. indwelling lines, tubes, and drains  - Monitor endotracheal if appropriate and nasal secretions for changes in amount and color  - [place] appropriate cooling/warming therapies per order  - Administer medications as ordered  - Instruct and encourage patient and family to use good hand hygiene technique  - Identify and instruct in appropriate isolation precautions for identified infection/condition  Outcome: Progressing  Goal: Absence of fever/infection during neutropenic period  Description: INTERVENTIONS:  - Monitor WBC    Outcome: Progressing     Problem: SAFETY ADULT  Goal: Patient will remain free of falls  Description: INTERVENTIONS:  - Educate patient/family on patient safety including physical limitations  - Instruct patient to call for assistance with activity   - Consult OT/PT to assist with strengthening/mobility   - Keep Call bell within reach  - Keep bed low and locked with side rails adjusted as appropriate  - Keep care items and personal belongings within reach  - Initiate and maintain comfort rounds  - Make Fall Risk Sign visible to staff  - Offer Toileting every 2 Hours,  in advance of need  - Initiate/Maintain bed/chair alarm  - Obtain necessary fall risk management equipment: nonskid socks  - Apply yellow socks and bracelet for high fall risk patients  - Consider moving patient to room near nurses station  Outcome: Progressing  Goal: Maintain or return to baseline ADL function  Description: INTERVENTIONS:  -  Assess patient's ability to carry out ADLs; assess patient's baseline for ADL function and identify physical deficits which impact ability to perform ADLs (bathing, care of mouth/teeth, toileting, grooming, dressing, etc.)  - Assess/evaluate cause of self-care deficits   - Assess range of motion  - Assess patient's mobility; develop plan if impaired  - Assess patient's need for assistive devices and provide as appropriate  - Encourage maximum independence but intervene and supervise when necessary  - Involve family in performance of ADLs  - Assess for home care needs following discharge   - Consider OT consult to assist with ADL evaluation and planning for discharge  - Provide patient education as appropriate  Outcome: Progressing  Goal: Maintains/Returns to pre admission functional level  Description: INTERVENTIONS:  - Perform AM-PAC 6 Click Basic Mobility/ Daily Activity assessment daily.  - Set and communicate daily mobility goal to care team and patient/family/caregiver.   - Collaborate with rehabilitation services on mobility goals if consulted  - Perform Range of Motion 3 times a day.  - Reposition patient every 2 hours.  - Dangle patient 3 times a day  - Stand patient 3 times a day  - Ambulate patient 3 times a day  - Out of bed to chair 3 times a day   - Out of bed for meals 3 times a day  - Out of bed for toileting  - Record patient progress and toleration of activity level   Outcome: Progressing     Problem: DISCHARGE PLANNING  Goal: Discharge to home or other facility with appropriate resources  Description: INTERVENTIONS:  - Identify barriers to discharge w/patient  and caregiver  - Arrange for needed discharge resources and transportation as appropriate  - Identify discharge learning needs (meds, wound care, etc.)  - Arrange for interpretive services to assist at discharge as needed  - Refer to Case Management Department for coordinating discharge planning if the patient needs post-hospital services based on physician/advanced practitioner order or complex needs related to functional status, cognitive ability, or social support system  Outcome: Progressing     Problem: Knowledge Deficit  Goal: Patient/family/caregiver demonstrates understanding of disease process, treatment plan, medications, and discharge instructions  Description: Complete learning assessment and assess knowledge base.  Interventions:  - Provide teaching at level of understanding  - Provide teaching via preferred learning methods  Outcome: Progressing     Problem: NEUROSENSORY - ADULT  Goal: Achieves stable or improved neurological status  Description: INTERVENTIONS  - Monitor and report changes in neurological status  - Monitor vital signs such as temperature, blood pressure, glucose, and any other labs ordered   - Initiate measures to prevent increased intracranial pressure  - Monitor for seizure activity and implement precautions if appropriate      Outcome: Progressing  Goal: Remains free of injury related to seizures activity  Description: INTERVENTIONS  - Maintain airway, patient safety  and administer oxygen as ordered  - Monitor patient for seizure activity, document and report duration and description of seizure to physician/advanced practitioner  - If seizure occurs,  ensure patient safety during seizure  - Reorient patient post seizure  - Seizure pads on all 4 side rails  - Instruct patient/family to notify RN of any seizure activity including if an aura is experienced  - Instruct patient/family to call for assistance with activity based on nursing assessment  - Administer anti-seizure medications  if ordered    Outcome: Progressing  Goal: Achieves maximal functionality and self care  Description: INTERVENTIONS  - Monitor swallowing and airway patency with patient fatigue and changes in neurological status  - Encourage and assist patient to increase activity and self care.   - Encourage visually impaired, hearing impaired and aphasic patients to use assistive/communication devices  Outcome: Progressing     Problem: SKIN/TISSUE INTEGRITY - ADULT  Goal: Skin Integrity remains intact(Skin Breakdown Prevention)  Description: Assess:  -Perform Alejandro assessment every shift  -Clean and moisturize skin every shift  -Inspect skin when repositioning, toileting, and assisting with ADLS  -Assess extremities for adequate circulation and sensation     Bed Management:  -Have minimal linens on bed & keep smooth, unwrinkled  -Change linens as needed when moist or perspiring  -Avoid sitting or lying in one position for more than 2 hours while in bed  -Keep HOB at 30degrees     Toileting:  -Offer bedside commode  -Assess for incontinence every 2  -Use incontinent care products after each incontinent episode such as barrier cream    Activity:  -Mobilize patient 3 times a day  -Encourage activity and walks on unit  -Encourage or provide ROM exercises   -Turn and reposition patient every 2 Hours  -Use appropriate equipment to lift or move patient in bed  -Instruct/ Assist with weight shifting every 2 when out of bed in chair  -Consider limitation of chair time 2 hour intervals    Skin Care:  -Avoid use of baby powder, tape, friction and shearing, hot water or constrictive clothing  -Relieve pressure over bony prominences using mepilex  -Do not massage red bony areas    Next Steps:  -Teach patient strategies to minimize risks such as shifting weight   -Consider consults to  interdisciplinary teams such as wound care  Outcome: Progressing  Goal: Incision(s), wounds(s) or drain site(s) healing without S/S of infection  Description:  INTERVENTIONS  - Assess and document dressing, incision, wound bed, drain sites and surrounding tissue  - Provide patient and family education  - Perform skin care/dressing changes every shift  Outcome: Progressing  Goal: Pressure injury heals and does not worsen  Description: Interventions:  - Implement low air loss mattress or specialty surface (Criteria met)  - Apply silicone foam dressing  - Instruct/assist with weight shifting every 60 minutes when in chair   - Limit chair time to 2 hour intervals  - Use special pressure reducing interventions such as cushion when in chair   - Apply fecal or urinary incontinence containment device   - Perform passive or active ROM every shift  - Turn and reposition patient & offload bony prominences every 2 hours   - Utilize friction reducing device or surface for transfers   - Consider consults to  interdisciplinary teams such as wound care  - Use incontinent care products after each incontinent episode such as barrier cream  - Consider nutrition services referral as needed  Outcome: Progressing     Problem: MUSCULOSKELETAL - ADULT  Goal: Maintain or return mobility to safest level of function  Description: INTERVENTIONS:  - Assess patient's ability to carry out ADLs; assess patient's baseline for ADL function and identify physical deficits which impact ability to perform ADLs (bathing, care of mouth/teeth, toileting, grooming, dressing, etc.)  - Assess/evaluate cause of self-care deficits   - Assess range of motion  - Assess patient's mobility  - Assess patient's need for assistive devices and provide as appropriate  - Encourage maximum independence but intervene and supervise when necessary  - Involve family in performance of ADLs  - Assess for home care needs following discharge   - Consider OT consult to assist with ADL evaluation and planning for discharge  - Provide patient education as appropriate  Outcome: Progressing  Goal: Maintain proper alignment of affected body  part  Description: INTERVENTIONS:  - Support, maintain and protect limb and body alignment  - Provide patient/ family with appropriate education  Outcome: Progressing     Problem: Prexisting or High Potential for Compromised Skin Integrity  Goal: Skin integrity is maintained or improved  Description: INTERVENTIONS:  - Identify patients at risk for skin breakdown  - Assess and monitor skin integrity  - Assess and monitor nutrition and hydration status  - Monitor labs   - Assess for incontinence   - Turn and reposition patient  - Assist with mobility/ambulation  - Relieve pressure over bony prominences  - Avoid friction and shearing  - Provide appropriate hygiene as needed including keeping skin clean and dry  - Evaluate need for skin moisturizer/barrier cream  - Collaborate with interdisciplinary team   - Patient/family teaching  - Consider wound care consult   Outcome: Progressing

## 2024-02-14 NOTE — PROCEDURES
Video Swallow Study      Patient Name: Thomas Chase  Today's Date: 2/14/2024        Past Medical History  Past Medical History:   Diagnosis Date    Arthritis     Encounter for general adult medical examination without abnormal findings 03/20/2019    Fall 11/03/2022    Hyperlipidemia     Macular degeneration, wet (HCC)     Urinary retention 06/02/2022        Past Surgical History  Past Surgical History:   Procedure Laterality Date    BRAIN HEMATOMA EVACUATION Left 05/28/2022    Procedure: left CRANIOTOMY FOR SUBDURAL HEMATOMA;  Surgeon: Chris Watts MD;  Location:  MAIN OR;  Service: Neurosurgery    CARPAL TUNNEL RELEASE      HERNIA REPAIR Right     inguinal    IR CEREBRAL ANGIOGRAPHY / INTERVENTION  06/02/2022    RI COCHLEAR DEVICE IMPLANTATION W/WO MASTOIDECTOMY Left 1/17/2023    Procedure: LEFT COCHLEAR IMPLANTATION WITH MASTOIDECTOMY AND FACIAL NERVE MONITORING WITH AUDIOMETRIC TESTING OF THE IMPLANT;  Surgeon: Susie Tuttle MD;  Location:  MAIN OR;  Service: ENT    RI NEUROPLASTY &/TRANSPOS MEDIAN NRV CARPAL TUNNE Right 09/20/2021    Procedure: RELEASE CARPAL TUNNEL;  Surgeon: Bruno Segovia MD;  Location: [facility];  Service: Orthopedics         Summary:  Images are on PACS for review.     Oral Phase : Pt presented with mild oral dysphagia. Mastication was mildly prolonged however functional. Bolus control, formation, and transfer were WFL/WNL.     Pharyngeal Phase: Pt presented with mod/severe pharyngeal dysphagia. Swallow initiation was delayed resulting in spill to the valleculae with all  trials and min spill to the pyriforms with thin liquids. Hyoid laryngeal elevation and excursion was WFL/WNL. Epiglottic inversion was incomplete.  Tip of epiglottis did not fully invert. Pharyngeal constriction was reduced. Pt had mild to mod vallecular retention with thin liquid trials. Provided with ntl/htl and all solid trials pt had mod to MAX vallecular  rentention. Residue from vallecular rentention resulted in some spilling over epiglottis to trace anterior commissure. Trace to mild pyriform retention was present with all consistencies. Trace aspiration occurred with straw sips and [place] with cup sips of thin intermittently. With trials of Nectar/honey thick liquids trace/mild aspiration occurred and increased epiglottic undercoat. Pt had no sensation of aspiration events. Increased difficulty following commands to utilize safe swallow strategies due to cognitive status and [place].  Cued cough however ineffective in reducing aspiration. As study progressed pt presented with increased wet vocal quality. Pt independently will initiates multiple swallows (3-4) to clear vallecular residue however are min successful     Per Esophageal screen   Limited screen as pt oropharyngeal dysphagia. Appeared to clear liquids/solids adequately. No residue at completion of study.        Observations:  Pt does have cochlear implant however continues with [place]. Benefits from simple written directions, however will intermittently follow due to cognitive status and [place].       Recommendations:  Diet: Dysphagia 1 Pureed   Liquids: Thin   Meds: Crushed w/ puree  Strategies: SLOW rate, SMALL SIPS, alternate liquids with solids, swallow prior to additonal po, double swallow, effortful swallow. Frequent oral care  Upright position  F/u ST tx: yes  Therapy Prognosis: guarded  Prognosis considerations:age, medical status, cognitive status  Full Supervision  Aspiration Precautions  Reflux Precautions  Consider consult with: Dietary  Results reviewed with: pt, nursing, family, physician, dietician  Aspiration precautions posted.    Goals:  Dysphagia LTG  -Patient will demonstrate safe and effective oral intake (without overt s/s significant oral/pharyngeal dysphagia including s/s penetration or aspiration) for the highest appropriate diet level.     Short Term Goals:  -Pt will tolerate Dysphagia  1/pureed diet and  thin liquid with no significant s/s oral or pharyngeal dysphagia across 1-3 diagnostic session/s    -Patient will comply with a Video/Modified Barium Swallow study for more complete assessment of swallowing anatomy/physiology/aspiration risk and to assess efficacy of treatment techniques         Patient's goal: none stated     H&P/Admit info, pertinent provider notes/procedures/studies/PMH:(copied and placed in this report)  Thomas Chase is a 83 y.o. male with a PMH of hyperlipidemia, hx of SDH who presents with difficulty standing. History was obtained from the patient's wife at the bedside. She states overall he's been slowly declining. She states he's been slowly losing weight over time even thought she tries to get him to eat. She states this morning she got up and made coffee in the kitchen and the patient will usually join her in the kitchen however he didn't which prompted her to check on him. She states she found him sitting in the bathroom and wasn't able to stand up by himself. She states she wasn't able to help him stand up either. This prompted them to come to the ED for evaluation as she's been having trouble taking care of him. She states he will be watching TV with her and then get up and go to bed even thought the program is not over. She denies him reporting any pain. She denies any fevers at home      Special Studies  XR chest portable (02/13/2024) Impression   No acute cardiopulmonary disease   CT head without contrast (02/13/2024) Impression  Thin subdural density measuring 3 mm in the left frontal region unchanged, previously attributed to subdural hematoma but could represent simple dural thickening given the chronicity       Code Status: Level 3 DN      Previous VBS:  10/05/2023 Impression   Oral stage:  Pt presented with minimal oral stage dysphagia.  Mastication was timely and grossly effective with materials administered today. Bolus formation and transfer were  functional.  Oral control appeared adequate with no gross premature spillage over the base of tongue.   Pharyngeal stage:  Pt presented with moderate pharyngeal dysphagia.  Velar elevation noted.  Swallowing initiation was prompt.  Laryngeal rise and anterior hyoid excursion appeared mildly weak.  Airway entrance closure appeared adequate.  Tongue base retraction is very weak, minimal BOT drive results in moderate-severe valleculae residue.  Pharyngeal constriction suspected to be mildly weak.  PES opening was adequate.    Strategies and Efficacy:  Small bites, small sips, slow rate, upright posture, multiple swallows per bolus, utilize liquid wash.  Aspiration Response and Efficacy:  All food, liquid and the barium tablet passed through the pharynx safely and efficiently (ie no laryngeal penetration or aspiration).  Esophageal stage:  Brief view of esophagus was completed after administration of PO items, pt does demonstrate mild residue throughout the proximal esophageal segment, some bulging near C4-C5 creating curvature of the cervical spine toward an anterior position.  Assessment Summary:  Pt presents with mild-moderate oropharyngeal dysphagia characterized by very weak, uncoordinated, and decreased BOT drive resulting in severe valleculae retention that demonstrates minimal improvements with subsequent swallows and liquid wash.  Patient also demonstrates UES weakness/laxity resulting in trace amounts of residue during dense solids (sandwich).  Thin liquid wash yielded mild improvement in valleculae retention of solids, no penetration occurs though risk is present.  Epiglottis does cover airway completely but minimal tongue drive results in minimal changes to valleculae retention.  Patient would benefit from mechanical soft solids, thin liquids, strategies listed below and initiation of speech/swallow therapy for lingual strengthening.  Patient's tongue weakness influences his overall speech intelligibility,  swallow safety and would increase his ability to communication    Precautions :   Aspiration  Fall      Food allergies:  No known    Current diet:  Dysphagia 3 dental soft and thin liquids   Premorbid diet:   Dental soft and thin   Dentition  Adequate    Oral Mech  WNL   O2 requirements:  Room air    Vocal Quality/Speech Hoarse    Cognitive status:  Alert        Consistencies administered: Barium laden applesauce, nutrigrain bar, hard solid, honey thick, nectar thick, thin liquids, Liquids were administered by cup and straw.     Pt was seated laterally at 90 degrees.   Pt unable to be viewed in AP position due to transfer status

## 2024-02-14 NOTE — NURSING NOTE
Patient admit to room 214, wife at bedside and reports extensive work up related to difficulty swallowing. Wife states she feeds patient soft foods and thin liquids at home. Patient given water and swallowed it with no difficulty. Swallow eval done and failed due to patient voice and speech. Provider at bedside and aware.

## 2024-02-14 NOTE — OCCUPATIONAL THERAPY NOTE
Occupational Therapy Evaluation      Thomas S Rena    2/14/2024    Principal Problem:    Debility  Active Problems:    Hypercholesterolemia    SDH (subdural hematoma) (HCC)    Hearing loss    Moderate protein-calorie malnutrition (HCC)    Dysphagia      Past Medical History:   Diagnosis Date    Arthritis     Encounter for general adult medical examination without abnormal findings 03/20/2019    Fall 11/03/2022    Hyperlipidemia     Macular degeneration, wet (HCC)     Urinary retention 06/02/2022       Past Surgical History:   Procedure Laterality Date    BRAIN HEMATOMA EVACUATION Left 05/28/2022    Procedure: left CRANIOTOMY FOR SUBDURAL HEMATOMA;  Surgeon: Chris Watts MD;  Location:  MAIN OR;  Service: Neurosurgery    CARPAL TUNNEL RELEASE      HERNIA REPAIR Right     inguinal    IR CEREBRAL ANGIOGRAPHY / INTERVENTION  06/02/2022    [place] COCHLEAR DEVICE IMPLANTATION W/WO MASTOIDECTOMY Left 1/17/2023    Procedure: LEFT COCHLEAR IMPLANTATION WITH MASTOIDECTOMY AND FACIAL NERVE MONITORING WITH AUDIOMETRIC TESTING OF THE IMPLANT;  Surgeon: Susie Tuttle MD;  Location:  MAIN OR;  Service: ENT    OH NEUROPLASTY &/TRANSPOS MEDIAN NRV CARPAL TUNNE Right 09/20/2021    Procedure: RELEASE CARPAL TUNNEL;  Surgeon: Bruno Segovia MD;  Location: [facility];  Service: Orthopedics        02/14/24 0801   OT Last Visit   OT Visit Date 02/14/24   Note Type   Note type Evaluation   Pain Assessment   Pain Assessment Tool 0-10   Pain Score No Pain   Restrictions/Precautions   Weight Bearing Precautions Per Order No   Other Precautions Cognitive;Chair Alarm;Bed Alarm;Fall Risk;Hard of hearing   Home Living   Type of Home House   Home Layout Multi-level;Bed/bath upstairs;Able to live on main level with bedroom/bathroom;1/2 bath on main level;Stairs to enter with rails  (1 HATTIE)   Bathroom Accessibility Accessible   Home Equipment   (unknown baseline AD usage, if any)   Additional Comments pt poor historian at time of  IE, PT attempted pocket talker however pt unable to hear with volume at max level. PT obtained PLOF and social history from previous PT evaluation on 5/29/22   Prior Function   Level of [place] Independent with ADLs;Independent with functional mobility   Lives With Spouse   Receives Help From Family   IADLs   (unknown baseline)   Falls in the last 6 months   (+ fall history per chart review; pt unable to give #)   ADL   UB Bathing Assistance 5  Supervision/Setup   LB Bathing Assistance 3  Moderate Assistance   UB Dressing Assistance 4  Minimal Assistance   LB Dressing Assistance 3  Moderate Assistance   Toileting Assistance  4  Minimal Assistance   Toileting Deficit Setup;Verbal cueing;Supervison/safety;Increased time to complete;Bedside commode   Bed Mobility   Supine to Sit 5  Supervision   Additional items Assist x 1;HOB elevated;Increased time required   Additional Comments Pt remained OOB in recliner upon conclusion   Transfers   Sit to Stand 4  Minimal assistance   Additional items Assist x 1;Increased time required   Stand to Sit 4  Minimal assistance   Additional items Assist x 1;Armrests;Verbal cues   Toilet transfer 4  Minimal assistance   Additional items Assist x 1;Commode;Increased time required   Functional Mobility   Functional Mobility 4  Minimal assistance   Additional Comments A x1   Additional items Hand hold assistance   Balance   Static Sitting Fair +   Dynamic Sitting Fair   Static Standing Fair -   Dynamic Standing Poor +   Ambulatory Poor +   Activity Tolerance   Activity Tolerance Patient limited by fatigue;Treatment limited secondary to medical complications (Comment)  ([facility])   Medical Staff Made Aware CM notified   Nurse Made Aware RN Kavitha   RUE Assessment   RUE Assessment X  (AROM shoulder flexion ~90 degrees, elbows distally WFL)   RUE Strength   RUE Overall Strength Deficits  (3/5)   LUE Assessment   LUE Assessment X  (AROM shoulder flexion ~90 degrees, elbows distally WFL)   LUE  Strength   LUE Overall Strength Deficits  (3/5)   Vision-Basic Assessment   Current Vision Wears glasses only for reading   Cognition   Overall Cognitive Status Impaired   Arousal/Participation Alert;Cooperative   Attention Difficulty attending to directions   Orientation Level Oriented to person;Oriented to time;Disoriented to place;Disoriented to situation   Memory Decreased recall of precautions;Decreased recall of recent events;Decreased recall of biographical information   Following Commands Follows one step commands with increased time or repetition   Comments OT communicated primarily with writing via white board/marker, also gesturing and lip reading successful at times   Assessment   Limitation Decreased ADL status;Decreased UE strength;Decreased Safe judgement during ADL;Decreased endurance;Decreased self-care trans;Decreased high-level ADLs;Decreased UE ROM;Decreased cognition   Prognosis Good   Assessment Pt is a 83 y.o. male seen for OT evaluation s/p admit to [facility] w/ Debility. Comorbidities affecting pt's functional performance at time of assessment include:  Borderline HTN, Ischemic vasculat dementia, Dysphagia . Personal factors affecting pt at time of IE include:steps to enter environment, difficulty performing ADLS, limited insight into deficits, and decreased initiation and engagement . Prior to admission, pt was Mod I with ADLs. Upon evaluation: the following deficits impact occupational performance: decreased ROM, decreased strength, decreased balance, decreased tolerance, impaired initiation, impaired memory, impaired sequencing, impaired problem solving, and decreased safety awareness. Pt to benefit from continued skilled OT tx while in the hospital to address deficits as defined above and maximize level of functional independence w ADL's and functional mobility. Occupational Performance areas to address include: bathing/shower, toilet hygiene, dressing, functional mobility,  and clothing management. From OT standpoint, recommendation at time of d/c would be Level II (Moderate Resource Intensity.   Goals   Patient Goals none expressed by pt re: therapy outcomes   Plan   Treatment Interventions ADL retraining;Functional transfer training;UE strengthening/ROM;Endurance training;Patient/family training;Equipment evaluation/education;Compensatory technique education;Energy conservation;Activityengagement;Cognitive reorientation   Goal Expiration Date 02/24/24   OT Treatment Day 0   OT Frequency 3-5x/wk   Discharge Recommendation   Rehab Resource Intensity Level, OT II (Moderate Resource Intensity)   Additional Comments  Pt seen as a co-eval with PT due to the patient's co-morbidities, clinically unstable presentation, and present impairments which are a regression from the patient's baseline.   Additional Comments 2 The patient's raw score on the AM-PAC Daily Activity Inpatient Short Form is 15. A raw score of less than 19 suggests the patient may benefit from discharge to post-acute rehabilitation services. Please refer to the recommendation of the Occupational Therapist for safe discharge planning.   AM-PAC Daily Activity Inpatient   Lower Body Dressing 2   Bathing 2   Toileting 2   Upper Body Dressing 3   Grooming 3   Eating 3   Daily Activity Raw Score 15   Daily Activity Standardized Score (Calc for Raw Score >=11) 34.69   AM-PAC Applied Cognition Inpatient   Following a Speech/Presentation 3   Understanding Ordinary Conversation 3   Taking Medications 1   Remembering Where Things Are Placed or Put Away 1   Remembering List of 4-5 Errands 1   Taking Care of Complicated Tasks 1   Applied Cognition Raw Score 10   Applied Cognition Standardized Score 24.98     GOALS:    Pt will achieve the following within specified time frame: STG  Pt will achieve the following goals within 5 days    *ADL transfers with CGA for inc'd independence with ADLs/purposeful tasks    *Self Feeding- (S) for inc'd  independence with providing self nourishment    *UB ADL with (S) for inc'd independence with self cares    *LB ADL with Min (A) using AE prn for inc'd independence with self cares    *Toileting with Min (A) for clothing management and hygiene for return to PLOF with personal care    *Increase static stand balance to F and dyn stand balance to F- for inc'd safety with standing purposeful tasks    *Increase stand tolerance x3 m for inc'd tolerance with standing purposeful tasks    *Participate in 10m UE therex to increase overall stamina/activity tolerance for purposeful tasks    *Bed mobility- (S) for inc'd independence to manage own comfort and initiate EOB & OOB purposeful tasks    Pt will achieve the following within specified time frame: LTG  Pt will achieve the following goals within 10 days    *ADL transfers with (S) for inc'd independence with ADLs/purposeful tasks    *Self Feeding- (I) for inc'd independence with providing self nourishment    *UB ADL with (I) for inc'd independence with self cares    *LB ADL with CGA using AE prn for inc'd independence with self cares    *Toileting with CGA for clothing management and hygiene for return to PLOF with personal care    *Increase static stand balance to F+ and dyn stand balance to F for inc'd safety with standing purposeful tasks    *Increase stand tolerance x5 m for inc'd tolerance with standing purposeful tasks    *Bed mobility- (I) for inc'd independence to manage own comfort and initiate EOB & OOB purposeful tasks      Marychuy Martinez MS, OTR/L

## 2024-02-14 NOTE — ASSESSMENT & PLAN NOTE
History of subdural hematoma  The patient was taken to OR 5/28/22 for L craniotomy with SD drain placement x 2  CT head (2/13): Thin subdural density measuring 3 mm in the left frontal region unchanged, previously attributed to subdural hematoma but could represent simple dural thickening given the chronicity  Will avoid anticoagulants for now

## 2024-02-14 NOTE — PHYSICAL THERAPY NOTE
Physical Therapy Evaluation   Time in: 0802  Time out: 0825  Total evaluation time: 23 minutes    Patient's Name: Thomas Chase    Admitting Diagnosis  Weakness generalized [R53.1]  Ambulatory dysfunction [R26.2]    Problem List  Patient Active Problem List   Diagnosis    Hypercholesterolemia    Borderline hypertension    Negative depression screening    Overweight (BMI 25.0-29.9)    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HCC)    Right hand pain    Carpal tunnel syndrome on right    Hygroma    Primary osteoarthritis of left knee    SDH (subdural hematoma) (HCC)    Hearing loss    Constipation    Moderate protein-calorie malnutrition (HCC)    Hypotension    Diastolic dysfunction    EKG abnormalities    Abnormal echocardiogram    Right bundle-branch block    Balance disorder    Sensorineural hearing loss (SNHL), bilateral    Macular degeneration, age related    Traumatic subdural hemorrhage with loss of consciousness of unspecified duration, initial encounter ([facility])    Pharyngeal myoclonus    Dysphagia    Debility       Past Medical History  Past Medical History:   Diagnosis Date    Arthritis     Encounter for general adult medical examination without abnormal findings 03/20/2019    Fall 11/03/2022    Hyperlipidemia     Macular degeneration, wet ([facility])     Urinary retention 06/02/2022       Past Surgical History  Past Surgical History:   Procedure Laterality Date    BRAIN HEMATOMA EVACUATION Left 05/28/2022    Procedure: left CRANIOTOMY FOR SUBDURAL HEMATOMA;  Surgeon: Chris Watts MD;  [facility];  Service: Neurosurgery    CARPAL TUNNEL RELEASE      HERNIA REPAIR Right     inguinal    IR CEREBRAL ANGIOGRAPHY / INTERVENTION  06/02/2022    [place] COCHLEAR DEVICE IMPLANTATION W/WO MASTOIDECTOMY Left 1/17/2023    Procedure: LEFT COCHLEAR IMPLANTATION WITH MASTOIDECTOMY AND FACIAL NERVE MONITORING WITH AUDIOMETRIC TESTING OF THE IMPLANT;  Surgeon: Susie Tuttle MD;  Location: [facility];  Service: ENT    VA  NEUROPLASTY &/TRANSPOS MEDIAN NRV CARPAL TUNNE Right 09/20/2021    Procedure: RELEASE CARPAL TUNNEL;  Surgeon: Bruno Segovia MD;  Location: [facility];  Service: Orthopedics       PT performed at least 2 patient identifiers during session: Name and wristband.       02/14/24 0825   PT Last Visit   PT Visit Date 02/14/24   Note Type   Note type Evaluation   Pain Assessment   Pain Assessment Tool 0-10   Pain Score No Pain   Restrictions/Precautions   Weight Bearing Precautions Per Order No   Other Precautions Cognitive;Chair Alarm;Bed Alarm;Fall Risk;Hard of hearing   Home Living   Type of Home House   Home Layout Multi-level;Bed/bath upstairs;Able to live on main level with bedroom/bathroom;1/2 bath on main level;Stairs to enter with rails  (1 HATTIE)   Bathroom Accessibility Accessible   Home Equipment   (unknown baseline AD usage, if any)   Additional Comments pt poor historian at time of IE, PT attempted pocket talker however pt unable to hear with volume at max level.  PT obtained PLOF and social history from previous PT evaluation on 5/29/22   Prior Function   Level of [place] Independent with ADLs;Independent with functional mobility   Lives With Spouse   Receives Help From Family   IADLs   (unknown baseline)   Falls in the last 6 months   (+ fall history per chart review; pt unable to give #)   General   Family/Caregiver Present No   Cognition   Overall Cognitive Status Impaired   Arousal/Participation Alert   Orientation Level Oriented to person;Disoriented to place;Oriented to time;Disoriented to situation   Memory Decreased recall of precautions;Decreased recall of recent events;Decreased recall of biographical information   Following Commands Follows one step commands with increased time or repetition   Comments PT communicated primarily with writing via white board/marker, also gesturing and lip reading successful at times   RLE Assessment   RLE Assessment X   Strength RLE   RLE Overall Strength 3+/5    LLE Assessment   LLE Assessment X   Strength LLE   LLE Overall Strength 4-/5   Vision-Basic Assessment   Current Vision Wears glasses only for reading   Coordination   Movements are Fluid and Coordinated 0   Bed Mobility   Supine to Sit 5  Supervision   Additional items Assist x 1;HOB elevated;Increased time required   Additional Comments pt denies any dizsziness   Transfers   Sit to Stand 4  Minimal assistance   Additional items Assist x 1;Increased time required   Stand to Sit 4  Minimal assistance   Additional items Assist x 1;Armrests;Verbal cues  (TCs for hand placement)   Toilet transfer 4  Minimal assistance   Additional items Assist x 1;Commode;Increased time required   Ambulation/Elevation   Gait pattern Improper Weight shift;Decreased foot clearance;Shuffling;Short stride;Forward Flexion   Gait Assistance 4  Minimal assist   Additional items Assist x 1;Tactile cues   Assistive Device   ([facility])   Distance 5 ft x 3   Stair Management Assistance Not tested   Balance   Static Sitting Fair +   Dynamic Sitting Fair   Static Standing Fair -   Dynamic Standing Poor +   Ambulatory Poor +   Endurance Deficit   Endurance Deficit Yes   Activity Tolerance   Activity Tolerance Patient limited by fatigue;Treatment limited secondary to medical complications (Comment)  ([facility])   Medical Staff Made Aware OT Myrisa present for co evaluation due to pts current medical presentation and decreased activity tolerance which all impact pts overall physical performance   Nurse Made Aware Yes, RN Kavitha made aware of outcomes/recs   Assessment   Prognosis Fair   Problem List Decreased strength;Decreased endurance;Impaired balance;Decreased mobility;Decreased cognition;Impaired judgement;Decreased safety awareness;Impaired hearing   Assessment Pt is 83 y.o. male seen for high-complexity PT evaluation on 2/14/2024 s/p admit to [facility] on 2/13/2024 w/ Debility. PT initially consulted to assess pt's functional mobility  and d/c needs. Order placed for PT eval and tx, w/ up and OOB as tolerated order. Pt is questionable historian, per chart review pt lives with wife at home. At time of eval, pt requires SUP for supine>sit, min Ax1 for STS and amb x 5' x 3 trials. Upon evaluation, pt presenting with impaired functional mobility d/t decreased strength, decreased endurance, impaired balance, decreased mobility, decreased cognition, impaired judgement, decreased safety awareness, and impaired hearing. Pertinent PMHx and current co-morbidities affecting pt's physical performance at time of assessment include: SDH, hearing loss, moderate protein-calorie malnutrition, debility, arthritis, h/o fall, HLD. Personal factors affecting pt at time of eval include: positive fall history, hearing impairments, and increased age . Objective measures performed on IE also reveal limitations: AM-PAC 6-Clicks: 16/24. Pt's clinical presentation is currently unstable/unpredictable seen in pt's presentation of need for input for task focus and mobility technique, ongoing medical assessment, and progressive decline prior to admission with reduced safety awareness . Overall, pt's rehab potential and prognosis to return to PLOF is fair as impacted by objective findings, warranting pt to receive further skilled PT interventions to address identified impairments, activity limitation(s), and participation restriction(s). Pt is to benefit from continued PT tx to address deficits as defined above and maximize level of functional independent mobility and consistency in order for pt to improve safety with mobility tasks. From PT/mobility standpoint, recommendation at time of d/c would be level 2, moderate resource intensity PT services, pending pt's overall functional progress in order to facilitate return to PLOF and abilities.   Barriers to Discharge Inaccessible home environment  (unknown pt's baseline)   Goals   Patient Goals none expressed by pt re: therapy  outcomes   STG Expiration Date 02/24/24   Short Term Goal #1 In 7-10 days pt will complete: 1) Bed mobility skills with mod I to increase safety and independence as well as decrease caregiver burden. 2) Functional transfers with S to promote increased independence, safety, and QOL. 3) Ambulate 150' using least restrictive AD with S without LOB and stable vitals so that pt can negotiate previous living environment safely and promote independence with functional mobility and return to PLOF. 4) Stair training up/ down 3 step/s using rail/s with S so that pt can enter/negotiate previous living environment safely and decrease fall risk. 5) Improve balance grades by 1/2 grade to increase safety with all mobility and decrease fall risk.  6) Improve BLE strength by 1/2 grade to help increase overall functional mobility and decrease fall risk.   PT Treatment Day 0   Plan   Treatment/Interventions Functional transfer training;LE strengthening/ROM;Elevations;Therapeutic exercise;Endurance training;Cognitive reorientation;Patient/family training;Equipment eval/education;Bed mobility;Gait training;Spoke to nursing   PT Frequency 3-5x/wk   Discharge Recommendation   Rehab Resource Intensity Level, PT II (Moderate Resource Intensity)   Equipment Recommended   (pt to benefit from RW trial next session)   Additional Comments Pt's raw score on the -[facility] Basic Mobility inpatient short form is 16, standardized score is 38.32. Patients at this level are likely to benefit from DC to post acute rehabilitation services, however, please refer to therapist recommendation for safe DC planning.   AM-PAC Basic Mobility Inpatient   Turning in Flat Bed Without Bedrails 3   Lying on Back to Sitting on Edge of Flat Bed Without Bedrails 3   Moving Bed to Chair 3   Standing Up From Chair Using Arms 3   Walk in Room 2   Climb 3-5 Stairs With Railing 2   Basic Mobility Inpatient Raw Score 16   Basic Mobility Standardized Score 38.32   Highest Level Of  Mobility   JH-HLM Goal 5: Stand one or more mins   -HLM Achieved 5: Stand (1 or more minutes)   End of Consult   Patient Position at End of Consult Bedside chair;Bed/Chair alarm activated;All needs within reach       Saleem Salcido, PT, DPT

## 2024-02-14 NOTE — ASSESSMENT & PLAN NOTE
Patient with history of dysphagia, barium swallow performed a few months ago  Will change to dysphagia level 3 diet  Swallow eval pending  Aspiration precautions

## 2024-02-14 NOTE — CASE MANAGEMENT
Case Management Assessment & Discharge Planning Note    Patient name Thomas Chase  Location /-01 MRN [id number]  : 1940 Date 2024       Current Admission Date: 2024  Current Admission Diagnosis:Debility   Patient Active Problem List    Diagnosis Date Noted    Severe protein-calorie malnutrition (HCC) 2024    Debility 2024    Pharyngeal myoclonus 2023    Dysphagia 2023    Macular degeneration, age related 2023    Traumatic subdural hemorrhage with loss of consciousness of unspecified duration, initial encounter (HCC) 2023    Sensorineural hearing loss (SNHL), bilateral 2022    Balance disorder 2022    Abnormal echocardiogram 2022    Right bundle-branch block 2022    Moderate protein-calorie malnutrition (HCC) 2022    Hypotension 2022    Diastolic dysfunction 2022    EKG abnormalities 2022    Constipation 2022    SDH (subdural hematoma) (HCC) 2022    Primary osteoarthritis of left knee 2022    Hygroma 2022    Right hand pain     Carpal tunnel syndrome on right     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[facility]) 2021    Overweight (BMI 25.0-29.9) 2021    Negative depression screening 2019    Hypercholesterolemia 2018    Borderline hypertension 2018    Hearing loss 2009      LOS (days): 0  Geometric Mean LOS (GMLOS) (days): 2.6  Days to GMLOS:2.2     OBJECTIVE:    Risk of Unplanned Readmission Score: 10.11         Current admission status: Inpatient  Referral Reason: Other (d/c planning)    Preferred Pharmacy:   [facility] [place] - [address]  [address] [address]  Phone: [phone] Fax: [phone]    Primary Care Provider: RONY Diaz    Primary Insurance: MEDICARE  Secondary Insurance: [place]    ASSESSMENT:  Active Health Care Proxies    There are no active  Health Care Proxies on file.       Advance Directives  Does patient have a Health Care POA?: Yes (family needs to bring in paperwork)  Does patient have Advance Directives?: Yes (family needs to bring in paperwork)         Readmission Root Cause  30 Day Readmission: No    Patient Information  Admitted from:: Home  Mental Status: Alert  During Assessment patient was accompanied by: Spouse  Assessment information provided by:: Spouse  Primary Caregiver: Spouse  Caregiver's Name:: Gina Chase  Caregiver's Relationship to Patient:: Significant Other (wife)  Caregiver's Telephone Number:: [phone]  cell# [phone]  Support Systems: Spouse/significant other  County of Residence: [place]  What city do you live in?: [place]  Home entry access options. Select all that apply.: Stairs  Number of steps to enter home.: 1  Do the steps have railings?: No  Type of Current Residence: [address] [place] home  Upon entering residence, is there a bedroom on the main floor (no further steps)?: No  A bedroom is located on the following floor levels of residence (select all that apply):: 2nd Floor  Upon entering residence, is there a bathroom on the main floor (no further steps)?: Yes  Number of steps to 2nd floor from main floor: One Flight  Living Arrangements: Lives w/ Spouse/significant other  Is patient a ?: No    Activities of Daily Living Prior to Admission  Functional Status: Assistance (driving, cooking, meds,laundry, shopping,)  Completes ADLs independently?: No  Level of ADL dependence: Assistance  Ambulates independently?: Yes  Does patient use assisted devices?: Yes  Assisted Devices (DME) used: Quad Cane  Does patient currently own DME?: Yes  What DME does the patient currently own?: Quad Cane, Bedside Commode, Shower Chair, Other (Comment) (bp cuff)  Does patient have a history of Outpatient Therapy (PT/OT)?: Yes  Does the patient have a history of Short-Term Rehab?: No  Does patient have a history of HHC?: No  Does  patient currently have [facility]?: No         Patient Information Continued  Income Source: Pension/senior care  Does patient have prescription coverage?: Yes ([employer])  Does patient receive dialysis treatments?: No  Does patient have a history of substance abuse?: No  Does patient have a history of Mental Health Diagnosis?: Yes (depression)  Is patient receiving treatment for mental health?: Yes (medication from his pcp)  Has patient received inpatient treatment related to mental health in the last 2 years?: No         Means of Transportation  Means of Transport to [facility]:: Family transport      Social Determinants of Health (SDOH)      Flowsheet Row Most Recent Value   Housing Stability    In the last 12 months, was there a time when you were not able to pay the mortgage or rent on time? N   In the last 12 months, how many places have you lived? 1   In the last 12 months, was there a time when you did not have a steady place to sleep or slept in a shelter (including now)? N   Transportation Needs    In the past 12 months, has lack of transportation kept you from medical appointments or from getting medications? no   In the past 12 months, has lack of transportation kept you from meetings, work, or from getting things needed for daily living? No   Food Insecurity    Within the past 12 months, you worried that your food would run out before you got the money to buy more. Never true   Within the past 12 months, the food you bought just didn't last and you didn't have money to get more. Never true   Utilities    In the past 12 months has the electric, gas, oil, or water company threatened to shut off services in your home? No            DISCHARGE DETAILS:    Discharge planning discussed with:: patient & spouse at the bedside  Freedom of Choice: Yes  Comments - Freedom of Choice: receommendation moder level ll- rehab - permission was given to send blanket referrals via kylee  CM contacted family/caregiver?: Yes              Contacts  Patient Contacts: Gina Chase  Relationship to Patient:: Family (wife)  Contact Method: In Person  Reason/Outcome: Discharge Planning    Requested Home Health Care         Is the patient interested in HHC at discharge?: No    DME Referral Provided  Referral made for DME?: No    Other Referral/Resources/Interventions Provided:  Interventions: Short Term Rehab  Referral Comments: referrals sent via kylee for snf rehab    Would you like to participate in our [facility] service program?  : No - Declined    Treatment Team Recommendation: Short Term Rehab (snf rehab referrals sent- TBD)                                         Family notified:: wife at the bedside

## 2024-02-14 NOTE — ED CARE HANDOFF
Emergency Department Sign Out Note        Sign out and transfer of care from [facility]. See Separate Emergency Department note.     The patient, Thomas Chase, was evaluated by the previous provider for weakness.    Workup Completed:      ED Course / Workup Pending (followup):  Labs/CT pending          CT scan, labs reassuring.  Patient will require admission for generalized weakness, inability to care for self.                        ED Course as of 02/13/24 1945 Tue Feb 13, 2024   1554 Generalized weakness  No falls  Aphasic at baseline  No resources at home     Procedures  Medical Decision Making  Amount and/or Complexity of Data Reviewed  Labs: ordered.  Radiology: ordered and independent interpretation performed.    Risk  Decision regarding hospitalization.            Disposition  Final diagnoses:   Ambulatory dysfunction     Time reflects when diagnosis was documented in both MDM as applicable and the Disposition within this note       Time User Action Codes Description Comment    2/13/2024  5:07 PM Jose Middleton Add [R26.2] Ambulatory dysfunction     2/13/2024  5:39 PM [place] [R53.81] Debility     2/13/2024  5:39 PM [place] [E44.0] Moderate protein-calorie malnutrition (HCC)           ED Disposition       ED Disposition   Admit    Condition   Stable    Date/Time   Tue Feb 13, 2024  3:59 PM    Comment   Case was discussed with rach and the patient's admission status was agreed to be Admission Status: inpatient status to the service of Dr. loyd .               Follow-up Information    None       Current Discharge Medication List        CONTINUE these medications which have NOT CHANGED    Details   Multiple Vitamin (MULTIVITAMIN) tablet Take by mouth daily       Multiple Vitamins-Minerals (PRESERVISION AREDS 2 PO) Take by mouth      simvastatin (ZOCOR) 20 mg tablet Take 1 tablet (20 mg total) by mouth daily at bedtime  Qty: 90 tablet, Refills: 2    Associated Diagnoses:  Hypercholesterolemia      acetaminophen (TYLENOL) 325 mg tablet Take 2 tablets (650 mg total) by mouth every 6 (six) hours as needed for mild pain  Refills: 0    Associated Diagnoses: Primary osteoarthritis of left knee      loratadine (CLARITIN) 10 mg tablet Take 1 tablet (10 mg total) by mouth daily  Qty: 30 tablet, Refills: 3    Associated Diagnoses: Allergy, initial encounter           No discharge procedures on file.       ED Provider  Electronically Signed by     Jose Middleton DO  02/13/24 1046

## 2024-02-14 NOTE — TELEPHONE ENCOUNTER
Consult placed on [facility] queue at 0948 to be seen by an [facility] psychiatrist.

## 2024-02-14 NOTE — PLAN OF CARE
Problem: OCCUPATIONAL THERAPY ADULT  Goal: Performs self-care activities at highest level of function for planned discharge setting.  See evaluation for individualized goals.  Description: Treatment Interventions: ADL retraining, Functional transfer training, UE strengthening/ROM, Endurance training, Patient/family training, Equipment evaluation/education, Compensatory technique education, Energy conservation, Activityengagement, Cognitive reorientation    See flowsheet documentation for full assessment, interventions and recommendations.   Note: Limitation: Decreased ADL status, Decreased UE strength, Decreased Safe judgement during ADL, Decreased endurance, Decreased self-care trans, Decreased high-level ADLs, Decreased UE ROM, Decreased cognition  Prognosis: Good  Assessment: Pt is a 83 y.o. male seen for OT evaluation s/p admit to [facility] on 2/13/2024 w/ Debility. Comorbidities affecting pt's functional performance at time of assessment include:  Borderline HTN, Ischemic vasculat dementia, Dysphagia . Personal factors affecting pt at time of IE include:steps to enter environment, difficulty performing ADLS, limited insight into deficits, and decreased initiation and engagement . Prior to admission, pt was Mod I with ADLs. Upon evaluation: the following deficits impact occupational performance: decreased ROM, decreased strength, decreased balance, decreased tolerance, impaired initiation, impaired memory, impaired sequencing, impaired problem solving, and decreased safety awareness. Pt to benefit from continued skilled OT tx while in the hospital to address deficits as defined above and maximize level of functional independence w ADL's and functional mobility. Occupational Performance areas to address include: bathing/shower, toilet hygiene, dressing, functional mobility, and clothing management. From OT standpoint, recommendation at time of d/c would be Level II (Moderate Resource Intensity.     Rehab  Resource Intensity Level, OT: II (Moderate Resource Intensity)     Marychuy Martinez, MS, OTR/L

## 2024-02-15 PROBLEM — R93.7 ABNORMAL CT SCAN, LUMBAR SPINE: Status: ACTIVE | Noted: 2024-02-15

## 2024-02-15 LAB
ANION GAP SERPL CALCULATED.3IONS-SCNC: 6 MMOL/L
BUN SERPL-MCNC: 17 MG/DL (ref 5–25)
CALCIUM SERPL-MCNC: 8.6 MG/DL (ref 8.4–10.2)
CHLORIDE SERPL-SCNC: 103 MMOL/L (ref 96–108)
CO2 SERPL-SCNC: 30 MMOL/L (ref 21–32)
CREAT SERPL-MCNC: 0.58 MG/DL (ref 0.6–1.3)
ERYTHROCYTE [DISTWIDTH] IN BLOOD BY AUTOMATED COUNT: 13.9 % (ref 11.6–15.1)
GFR SERPL CREATININE-BSD FRML MDRD: 93 ML/MIN/1.73SQ M
GLUCOSE SERPL-MCNC: 251 MG/DL (ref 65–140)
GLUCOSE SERPL-MCNC: 92 MG/DL (ref 65–140)
HCT VFR BLD AUTO: 41.2 % (ref 36.5–49.3)
HGB BLD-MCNC: 13.7 G/DL (ref 12–17)
MCH RBC QN AUTO: 32 PG (ref 26.8–34.3)
MCHC RBC AUTO-ENTMCNC: 33.3 G/DL (ref 31.4–37.4)
MCV RBC AUTO: 96 FL (ref 82–98)
PLATELET # BLD AUTO: 440 THOUSANDS/UL (ref 149–390)
PMV BLD AUTO: 9.3 FL (ref 8.9–12.7)
POTASSIUM SERPL-SCNC: 4.3 MMOL/L (ref 3.5–5.3)
RBC # BLD AUTO: 4.28 MILLION/UL (ref 3.88–5.62)
SODIUM SERPL-SCNC: 139 MMOL/L (ref 135–147)
VIT B12 SERPL-MCNC: 264 PG/ML (ref 180–914)
WBC # BLD AUTO: 6.18 THOUSAND/UL (ref 4.31–10.16)

## 2024-02-15 PROCEDURE — 82948 REAGENT STRIP/BLOOD GLUCOSE: CPT

## 2024-02-15 PROCEDURE — 99232 SBSQ HOSP IP/OBS MODERATE 35: CPT | Performed by: INTERNAL MEDICINE

## 2024-02-15 PROCEDURE — 97530 THERAPEUTIC ACTIVITIES: CPT

## 2024-02-15 PROCEDURE — NC001 PR NO CHARGE

## 2024-02-15 PROCEDURE — 97116 GAIT TRAINING THERAPY: CPT

## 2024-02-15 PROCEDURE — 82607 VITAMIN B-12: CPT | Performed by: INTERNAL MEDICINE

## 2024-02-15 PROCEDURE — 92526 ORAL FUNCTION THERAPY: CPT

## 2024-02-15 PROCEDURE — 80048 BASIC METABOLIC PNL TOTAL CA: CPT | Performed by: INTERNAL MEDICINE

## 2024-02-15 PROCEDURE — 85027 COMPLETE CBC AUTOMATED: CPT | Performed by: INTERNAL MEDICINE

## 2024-02-15 RX ORDER — DOCUSATE SODIUM 100 MG/1
100 CAPSULE, LIQUID FILLED ORAL 2 TIMES DAILY
Status: DISCONTINUED | OUTPATIENT
Start: 2024-02-15 | End: 2024-02-16 | Stop reason: HOSPADM

## 2024-02-15 RX ORDER — PREDNISONE 20 MG/1
40 TABLET ORAL DAILY
Status: DISCONTINUED | OUTPATIENT
Start: 2024-02-15 | End: 2024-02-16 | Stop reason: HOSPADM

## 2024-02-15 RX ORDER — SENNOSIDES 8.6 MG
1 TABLET ORAL
Status: DISCONTINUED | OUTPATIENT
Start: 2024-02-15 | End: 2024-02-16 | Stop reason: HOSPADM

## 2024-02-15 RX ORDER — LANOLIN ALCOHOL/MO/W.PET/CERES
6 CREAM (GRAM) TOPICAL
Status: DISCONTINUED | OUTPATIENT
Start: 2024-02-15 | End: 2024-02-16 | Stop reason: HOSPADM

## 2024-02-15 RX ADMIN — B-COMPLEX W/ C & FOLIC ACID TAB 1 TABLET: TAB at 08:15

## 2024-02-15 RX ADMIN — SERTRALINE HYDROCHLORIDE 25 MG: 25 TABLET ORAL at 08:15

## 2024-02-15 RX ADMIN — PRAVASTATIN SODIUM 40 MG: 40 TABLET ORAL at 17:18

## 2024-02-15 RX ADMIN — Medication 1 TABLET: at 08:15

## 2024-02-15 RX ADMIN — PREDNISONE 40 MG: 20 TABLET ORAL at 12:16

## 2024-02-15 RX ADMIN — DOCUSATE SODIUM 100 MG: 100 CAPSULE, LIQUID FILLED ORAL at 17:20

## 2024-02-15 NOTE — SPEECH THERAPY NOTE
Speech Language/Pathology    Speech/Language Pathology Progress Note    Patient Name: Thomas Chase  Today's Date: 2/15/2024     Problem List  Principal Problem:    Debility  Active Problems:    Hypercholesterolemia    SDH (subdural hematoma) (HCC)    Hearing loss    Moderate protein-calorie malnutrition (HCC)    Dysphagia    Severe protein-calorie malnutrition (HCC)       Past Medical History  Past Medical History:   Diagnosis Date    Arthritis     Encounter for general adult medical examination without abnormal findings 03/20/2019    Fall 11/03/2022    Hyperlipidemia     Macular degeneration, wet (HCC)     Urinary retention 06/02/2022        Past Surgical History  Past Surgical History:   Procedure Laterality Date    BRAIN HEMATOMA EVACUATION Left 05/28/2022    Procedure: left CRANIOTOMY FOR SUBDURAL HEMATOMA;  Surgeon: Chris Watts MD;  [facility]: [facility];  Service: Neurosurgery    CARPAL TUNNEL RELEASE      HERNIA REPAIR Right     inguinal    IR CEREBRAL ANGIOGRAPHY / INTERVENTION  06/02/2022    PA COCHLEAR DEVICE IMPLANTATION W/WO MASTOIDECTOMY Left 1/17/2023    Procedure: LEFT COCHLEAR IMPLANTATION WITH MASTOIDECTOMY AND FACIAL NERVE MONITORING WITH AUDIOMETRIC TESTING OF THE IMPLANT;  Surgeon: Susie Tuttle MD;  Location: [facility];  Service: ENT    PA NEUROPLASTY &/TRANSPOS MEDIAN NRV CARPAL TUNNE Right 09/20/2021    Procedure: RELEASE CARPAL TUNNEL;  Surgeon: Bruno Segovia MD;  Location: [facility];  Service: Orthopedics         Subjective:  Pt was positioned upright OOB in recliner and alert.   Objective:  Pt was seen for f/u dysphagia therapy. He fed himself puree and thin liquids via straw. Bolus control and formation were WNL. Transfer and swallows appeared min delayed. Pt x1 immediate successive cough with successive sips of thin liquids. Pt did have no response to aspiration in vbs completed day prior. Verbal/visual cues provided for pt to utilize small sips and double swallow.  Pt poor command following due to cognitive status. Noted pt did have empty ensure at bedside from this am.     Assessment:  Pt continues to be limited by mental status/[place]. Did have immediate successive cough following successive sips of thin. No other overt s/s of aspiration  however pt silently aspirated on vbs completed day prior.    Plan/Recommendations:  Continue dysphagia 1 pureed and thin liquids.   Ensure good oral care as pt  high risk of Aspiration.   ST continue f/u as indicated

## 2024-02-15 NOTE — DISCHARGE INSTR - DIET
Video Swallow Study        Patient Name: Thomas Chase  Today's Date: 2/14/2024        Past Medical History  Medical History        Past Medical History:   Diagnosis Date    Arthritis      Encounter for general adult medical examination without abnormal findings 03/20/2019    Fall 11/03/2022    Hyperlipidemia      Macular degeneration, wet (HCC)      Urinary retention 06/02/2022            Past Surgical History  Surgical History         Past Surgical History:   Procedure Laterality Date    BRAIN HEMATOMA EVACUATION Left 05/28/2022     Procedure: left CRANIOTOMY FOR SUBDURAL HEMATOMA;  Surgeon: Chris Watts MD;  Location:  MAIN OR;  Service: Neurosurgery    CARPAL TUNNEL RELEASE        HERNIA REPAIR Right       inguinal    IR CEREBRAL ANGIOGRAPHY / INTERVENTION   06/02/2022    MS COCHLEAR DEVICE IMPLANTATION W/WO MASTOIDECTOMY Left 1/17/2023     Procedure: LEFT COCHLEAR IMPLANTATION WITH MASTOIDECTOMY AND FACIAL NERVE MONITORING WITH AUDIOMETRIC TESTING OF THE IMPLANT;  Surgeon: Susie Tuttle MD;  Location:  MAIN OR;  Service: ENT    MS NEUROPLASTY &/TRANSPOS MEDIAN NRV CARPAL TUNNE Right 09/20/2021     Procedure: RELEASE CARPAL TUNNEL;  Surgeon: Bruno Segovia MD;  Location: [facility];  Service: Orthopedics               Summary:  Images are on PACS for review.      Oral Phase : Pt presented with mild oral dysphagia. Mastication was mildly prolonged however functional. Bolus control, formation, and transfer were WFL/WNL.      Pharyngeal Phase: Pt presented with mod/severe pharyngeal dysphagia. Swallow initiation was delayed resulting in spill to the valleculae with all  trials and min spill to the pyriforms with thin liquids. Hyoid laryngeal elevation and excursion was WFL/WNL. Epiglottic inversion was incomplete.  Tip of epiglottis did not fully invert. Pharyngeal constriction was reduced. Pt had mild to mod vallecular retention with thin liquid trials. Provided with ntl/htl and all solid  trials pt had mod to MAX vallecular rentention. Residue from vallecular rentention resulted in some spilling over epiglottis to trace anterior commissure. Trace to mild pyriform retention was present with all consistencies. Trace aspiration occurred with straw sips and [place] with cup sips of thin intermittently. With trials of Nectar/honey thick liquids trace/mild aspiration occurred and increased epiglottic undercoat. Pt had no sensation of aspiration events. Increased difficulty following commands to utilize safe swallow strategies due to cognitive status and [place].  Cued cough however ineffective in reducing aspiration. As study progressed pt presented with increased wet vocal quality. Pt independently will initiates multiple swallows (3-4) to clear vallecular residue however are min successful      Per Esophageal screen   Limited screen as pt oropharyngeal dysphagia. Appeared to clear liquids/solids adequately. No residue at completion of study.          Observations:  Pt does have cochlear implant however continues with [place]. Benefits from simple written directions, however will intermittently follow due to cognitive status and [place].        Recommendations:  Diet: Dysphagia 1 Pureed   Liquids: Thin   Meds: Crushed w/ puree  Strategies: SLOW rate, SMALL SIPS, alternate liquids with solids, swallow prior to additonal po, double swallow, effortful swallow. Frequent oral care  Upright position  F/u ST tx: yes  Therapy Prognosis: guarded  Prognosis considerations:age, medical status, cognitive status  Full Supervision  Aspiration Precautions  Reflux Precautions  Consider consult with: Dietary  Results reviewed with: pt, nursing, family, physician, dietician  Aspiration precautions posted.

## 2024-02-15 NOTE — PLAN OF CARE
Problem: PAIN - ADULT  Goal: Verbalizes/displays adequate comfort level or baseline comfort level  Description: Interventions:  - Encourage patient to monitor pain and request assistance  - Assess pain using appropriate pain scale  - Administer analgesics based on type and severity of pain and evaluate response  - Implement non-pharmacological measures as appropriate and evaluate response  - Consider cultural and social influences on pain and pain management  - Notify physician/advanced practitioner if interventions unsuccessful or patient reports new pain  Outcome: Progressing     Problem: INFECTION - ADULT  Goal: Absence or prevention of progression during hospitalization  Description: INTERVENTIONS:  - Assess and monitor for signs and symptoms of infection  - Monitor lab/diagnostic results  - Monitor all insertion sites, i.e. indwelling lines, tubes, and drains  - Monitor endotracheal if appropriate and nasal secretions for changes in amount and color  - [place] appropriate cooling/warming therapies per order  - Administer medications as ordered  - Instruct and encourage patient and family to use good hand hygiene technique  - Identify and instruct in appropriate isolation precautions for identified infection/condition  Outcome: Progressing  Goal: Absence of fever/infection during neutropenic period  Description: INTERVENTIONS:  - Monitor WBC    Outcome: Progressing     Problem: SAFETY ADULT  Goal: Patient will remain free of falls  Description: INTERVENTIONS:  - Educate patient/family on patient safety including physical limitations  - Instruct patient to call for assistance with activity   - Consult OT/PT to assist with strengthening/mobility   - Keep Call bell within reach  - Keep bed low and locked with side rails adjusted as appropriate  - Keep care items and personal belongings within reach  - Initiate and maintain comfort rounds  - Make Fall Risk Sign visible to staff  - Offer Toileting every 2 Hours,  in advance of need  - Initiate/Maintain bed alarm  - Obtain necessary fall risk management equipment: nonslip socks   - Apply yellow socks and bracelet for high fall risk patients  - Consider moving patient to room near nurses station  Outcome: Progressing  Goal: Maintain or return to baseline ADL function  Description: INTERVENTIONS:  -  Assess patient's ability to carry out ADLs; assess patient's baseline for ADL function and identify physical deficits which impact ability to perform ADLs (bathing, care of mouth/teeth, toileting, grooming, dressing, etc.)  - Assess/evaluate cause of self-care deficits   - Assess range of motion  - Assess patient's mobility; develop plan if impaired  - Assess patient's need for assistive devices and provide as appropriate  - Encourage maximum independence but intervene and supervise when necessary  - Involve family in performance of ADLs  - Assess for home care needs following discharge   - Consider OT consult to assist with ADL evaluation and planning for discharge  - Provide patient education as appropriate  Outcome: Progressing  Goal: Maintains/Returns to pre admission functional level  Description: INTERVENTIONS:  - Perform AM-PAC 6 Click Basic Mobility/ Daily Activity assessment daily.  - Set and communicate daily mobility goal to care team and patient/family/caregiver.   - Collaborate with rehabilitation services on mobility goals if consulted  - Perform Range of Motion 3 times a day.  - Reposition patient every 2 hours.  - Dangle patient 3 times a day  - Stand patient 3 times a day  - Ambulate patient 3 times a day  - Out of bed to chair 3 times a day   - Out of bed for meals 3 times a day  - Out of bed for toileting  - Record patient progress and toleration of activity level   Outcome: Progressing     Problem: DISCHARGE PLANNING  Goal: Discharge to home or other facility with appropriate resources  Description: INTERVENTIONS:  - Identify barriers to discharge w/patient and  caregiver  - Arrange for needed discharge resources and transportation as appropriate  - Identify discharge learning needs (meds, wound care, etc.)  - Arrange for interpretive services to assist at discharge as needed  - Refer to Case Management Department for coordinating discharge planning if the patient needs post-hospital services based on physician/advanced practitioner order or complex needs related to functional status, cognitive ability, or social support system  Outcome: Progressing     Problem: Knowledge Deficit  Goal: Patient/family/caregiver demonstrates understanding of disease process, treatment plan, medications, and discharge instructions  Description: Complete learning assessment and assess knowledge base.  Interventions:  - Provide teaching at level of understanding  - Provide teaching via preferred learning methods  Outcome: Progressing     Problem: NEUROSENSORY - ADULT  Goal: Achieves stable or improved neurological status  Description: INTERVENTIONS  - Monitor and report changes in neurological status  - Monitor vital signs such as temperature, blood pressure, glucose, and any other labs ordered   - Initiate measures to prevent increased intracranial pressure  - Monitor for seizure activity and implement precautions if appropriate      Outcome: Progressing  Goal: Remains free of injury related to seizures activity  Description: INTERVENTIONS  - Maintain airway, patient safety  and administer oxygen as ordered  - Monitor patient for seizure activity, document and report duration and description of seizure to physician/advanced practitioner  - If seizure occurs,  ensure patient safety during seizure  - Reorient patient post seizure  - Seizure pads on all 4 side rails  - Instruct patient/family to notify RN of any seizure activity including if an aura is experienced  - Instruct patient/family to call for assistance with activity based on nursing assessment  - Administer anti-seizure medications if  ordered    Outcome: Progressing  Goal: Achieves maximal functionality and self care  Description: INTERVENTIONS  - Monitor swallowing and airway patency with patient fatigue and changes in neurological status  - Encourage and assist patient to increase activity and self care.   - Encourage visually impaired, hearing impaired and aphasic patients to use assistive/communication devices  Outcome: Progressing     Problem: SKIN/TISSUE INTEGRITY - ADULT  Goal: Skin Integrity remains intact(Skin Breakdown Prevention)  Description: Assess:  -Perform Alejandro assessment every shift   -Clean and moisturize skin every 2 hours  -Inspect skin when repositioning, toileting, and assisting with ADLS  -Assess under medical devices such as masimo every 2 hours  -Assess extremities for adequate circulation and sensation     Bed Management:  -Have minimal linens on bed & keep smooth, unwrinkled  -Change linens as needed when moist or perspiring  -Avoid sitting or lying in one position for more than 2 hours while in bed  -Keep HOB at 30degrees     Toileting:  -Offer bedside commode  -Assess for incontinence every 2 hours  -Use incontinent care products after each incontinent episode such as barrier creams    Activity:  -Mobilize patient 3 times a day  -Encourage activity and walks on unit  -Encourage or provide ROM exercises   -Turn and reposition patient every 2 Hours  -Use appropriate equipment to lift or move patient in bed  -Instruct/ Assist with weight shifting every hour when out of bed in chair  -Consider limitation of chair time to 3 hour intervals    Skin Care:  -Avoid use of baby powder, tape, friction and shearing, hot water or constrictive clothing  -Relieve pressure over bony prominences using pillows  -Do not massage red bony areas    Next Steps:  -Teach patient strategies to minimize risks such as weight shifting   -Consider consults to  interdisciplinary teams such as wocn  Outcome: Progressing  Goal: Incision(s),  wounds(s) or drain site(s) healing without S/S of infection  Description: INTERVENTIONS  - Assess and document dressing, incision, wound bed, drain sites and surrounding tissue  - Provide patient and family education  - Perform skin care/dressing changes every shift  Outcome: Progressing  Goal: Pressure injury heals and does not worsen  Description: Interventions:  - Implement low air loss mattress or specialty surface (Criteria met)  - Apply silicone foam dressing  - Instruct/assist with weight shifting every 60 minutes when in chair   - Limit chair time to 3 hour intervals  - Use special pressure reducing interventions such as barrier creams when in chair   - Apply fecal or urinary incontinence containment device   - Perform passive or active ROM every 2 hours  - Turn and reposition patient & offload bony prominences every 2 hours   - Utilize friction reducing device or surface for transfers   - Consider consults to  interdisciplinary teams such as wocn  - Use incontinent care products after each incontinent episode such as barrier creams  - Consider nutrition services referral as needed  Outcome: Progressing     Problem: MUSCULOSKELETAL - ADULT  Goal: Maintain or return mobility to safest level of function  Description: INTERVENTIONS:  - Assess patient's ability to carry out ADLs; assess patient's baseline for ADL function and identify physical deficits which impact ability to perform ADLs (bathing, care of mouth/teeth, toileting, grooming, dressing, etc.)  - Assess/evaluate cause of self-care deficits   - Assess range of motion  - Assess patient's mobility  - Assess patient's need for assistive devices and provide as appropriate  - Encourage maximum independence but intervene and supervise when necessary  - Involve family in performance of ADLs  - Assess for home care needs following discharge   - Consider OT consult to assist with ADL evaluation and planning for discharge  - Provide patient education as  appropriate  Outcome: Progressing  Goal: Maintain proper alignment of affected body part  Description: INTERVENTIONS:  - Support, maintain and protect limb and body alignment  - Provide patient/ family with appropriate education  Outcome: Progressing     Problem: Prexisting or High Potential for Compromised Skin Integrity  Goal: Skin integrity is maintained or improved  Description: INTERVENTIONS:  - Identify patients at risk for skin breakdown  - Assess and monitor skin integrity  - Assess and monitor nutrition and hydration status  - Monitor labs   - Assess for incontinence   - Turn and reposition patient  - Assist with mobility/ambulation  - Relieve pressure over bony prominences  - Avoid friction and shearing  - Provide appropriate hygiene as needed including keeping skin clean and dry  - Evaluate need for skin moisturizer/barrier cream  - Collaborate with interdisciplinary team   - Patient/family teaching  - Consider wound care consult   Outcome: Progressing     Problem: Nutrition/Hydration-ADULT  Goal: Nutrient/Hydration intake appropriate for improving, restoring or maintaining nutritional needs  Description: Monitor and assess patient's nutrition/hydration status for malnutrition. Collaborate with interdisciplinary team and initiate plan and interventions as ordered.  Monitor patient's weight and dietary intake as ordered or per policy. Utilize nutrition screening tool and intervene as necessary. Determine patient's food preferences and provide high-protein, high-caloric foods as appropriate.     INTERVENTIONS:  - Monitor oral intake, urinary output, labs, and treatment plans  - Assess nutrition and hydration status and recommend course of action  - Evaluate amount of meals eaten  - Assist patient with eating if necessary   - Allow adequate time for meals  - Recommend/ encourage appropriate diets, oral nutritional supplements, and vitamin/mineral supplements  - Order, calculate, and assess calorie counts as  needed  - Recommend, monitor, and adjust tube feedings and TPN/PPN based on assessed needs  - Assess need for intravenous fluids  - Provide specific nutrition/hydration education as appropriate  - Include patient/family/caregiver in decisions related to nutrition  Outcome: Progressing

## 2024-02-15 NOTE — ASSESSMENT & PLAN NOTE
Malnutrition Findings:   Adult Malnutrition type: Chronic illness  Adult Degree of Malnutrition: Other severe protein calorie malnutrition  Malnutrition Characteristics: Fat loss, Muscle loss, Weight loss                  360 Statement: Severe protein calorie malnutrition related to chronic illness as evidenced by muscle wasting to clavicle and temple regions, subcutaneous fat loss to orbital and buccal regions, and significant weight loss of 13.1% (19#) within 6 months. Treating with oral diet and nutrition supplements.    BMI Findings:  Adult BMI Classifications: Underweight < 18.5        Body mass index is 17.19 kg/m².

## 2024-02-15 NOTE — PLAN OF CARE
Problem: PHYSICAL THERAPY ADULT  Goal: Performs mobility at highest level of function for planned discharge setting.  See evaluation for individualized goals.  Description: Treatment/Interventions: Functional transfer training, LE strengthening/ROM, Elevations, Therapeutic exercise, Endurance training, Cognitive reorientation, Patient/family training, Equipment eval/education, Bed mobility, Gait training, Spoke to nursing  Equipment Recommended:  (pt to benefit from RW trial next session)       See flowsheet documentation for full assessment, interventions and recommendations.  Outcome: Progressing  Note: Prognosis: Fair  Problem List: Decreased strength, Decreased endurance, Impaired balance, Decreased mobility, Decreased cognition, Impaired judgement, Decreased safety awareness, Impaired hearing  Assessment: Pt seen for PT treatment session this date, consisting of ther act focused on bed mobility & functional transfer training, ther ex focused on strengthening, and gt training on level surfaces to improve pt safety in household environment. Since previous session, pt has made good progress in terms of improved OOB mobility tolerance, requiring ongoing min Ax1 during ambulation for safety. Pertinent barriers during this session include cognitive status and [place] . Current goals and POC established on IE remain appropriate, pt continues to have rehab potential and is making fair progress towards STGs. Pt prognosis for achieving goals is fair, pending pt progress with hospitalization/medical status improvements, and indicated by supportive family/caregivers. Pt limited d/t poor orientation, inability to concentrate under maximum structure, and lack of ability to demonstrate mobility and/or self-care activities. Pt continues to be functioning below baseline level, and remains limited 2* factors listed above. PT will continue to see pt during current hospitalization in order to address the deficits listed above and  provide interventions consistent w/ POC in effort to achieve STGs. PT recommends level 2, moderate resource intensity upon discharge.  Barriers to Discharge: Inaccessible home environment     Rehab Resource Intensity Level, PT: II (Moderate Resource Intensity)    See flowsheet documentation for full assessment.

## 2024-02-15 NOTE — PHYSICAL THERAPY NOTE
"Physical Therapy Treatment Note       02/15/24 0759   PT Last Visit   PT Visit Date 02/15/24   Note Type   Note Type Treatment   Pain Assessment   Pain Assessment Tool 0-10   Pain Score No Pain   Pain Rating: FLACC (Rest) - Face 0   Pain Rating: FLACC (Rest) - Legs 0   Pain Rating: FLACC (Rest) - Activity 0   Pain Rating: FLACC (Rest) - Cry 0   Pain Rating: FLACC (Rest) - Consolability 0   Score: FLACC (Rest) 0   Pain Rating: FLACC (Activity) - Face 0   Pain Rating: FLACC (Activity) - Legs 0   Pain Rating: FLACC (Activity) - Activity 0   Pain Rating: FLACC (Activity) - Cry 0   Pain Rating: FLACC (Activity) - Consolability 0   Score: FLACC (Activity) 0   Restrictions/Precautions   Weight Bearing Precautions Per Order No   Other Precautions Cognitive;Chair Alarm;Bed Alarm;Fall Risk;Hard of hearing   General   Chart Reviewed Yes   Response to Previous Treatment Patient unable to report, no changes reported from family or staff   Family/Caregiver Present No   Cognition   Overall Cognitive Status Impaired   Arousal/Participation Alert;Responsive;Cooperative   Attention Attends with cues to redirect   Orientation Level Oriented to person;Disoriented to place;Disoriented to time;Disoriented to situation   Memory Decreased recall of precautions;Decreased recall of recent events;Decreased recall of biographical information   Following Commands Follows one step commands with increased time or repetition   Comments PT communicated primarily with writing via white board/marker, also gesturing and lip reading successful at times   Subjective   Subjective \"I don't need to go to the bathroom\"   Bed Mobility   Supine to Sit 4  Minimal assistance   Additional items Assist x 1;HOB elevated;Increased time required;LE management  (max TCs for sequencing)   Additional Comments HR at rest = 102 bpm, SpO2 on RA = 95%   Transfers   Sit to Stand 4  Minimal assistance   Additional items Assist x 1;Increased time required   Stand to Sit 4  " Minimal assistance   Additional items Assist x 1;Increased time required   Ambulation/Elevation   Gait pattern Improper Weight shift;Decreased foot clearance;Shuffling;Short stride;Forward Flexion   Gait Assistance 4  Minimal assist   Additional items Assist x 1;Verbal cues;Tactile cues  (cues for sequencing and safety)   Assistive Device Rolling walker   Distance 34 ft x 2   Stair Management Assistance Not tested   Balance   Static Sitting Good   Dynamic Sitting Fair +   Static Standing Fair -   Dynamic Standing Poor +   Ambulatory Poor +   Endurance Deficit   Endurance Deficit Yes   Activity Tolerance   Activity Tolerance Patient limited by fatigue;Treatment limited secondary to medical complications (Comment)  ([place], cognitive status)   Nurse Made Aware Yes, RN Isaiah made aware of outcomes/recs   Exercises   Knee AROM Long Arc Quad Sitting;15 reps;AROM;Right;Left   Marching Sitting;15 reps;AROM;Right;Left   Assessment   Prognosis Fair   Problem List Decreased strength;Decreased endurance;Impaired balance;Decreased mobility;Decreased cognition;Impaired judgement;Decreased safety awareness;Impaired hearing   Assessment Pt seen for PT treatment session this date, consisting of ther act focused on bed mobility & functional transfer training, ther ex focused on strengthening, and gt training on level surfaces to improve pt safety in household environment. Since previous session, pt has made good progress in terms of improved OOB mobility tolerance, requiring ongoing min Ax1 during ambulation for safety. Pertinent barriers during this session include cognitive status and [place] . Current goals and POC established on IE remain appropriate, pt continues to have rehab potential and is making fair progress towards STGs. Pt prognosis for achieving goals is fair, pending pt progress with hospitalization/medical status improvements, and indicated by supportive family/caregivers. Pt limited d/t poor orientation, inability to  concentrate under maximum structure, and lack of ability to demonstrate mobility and/or self-care activities. Pt continues to be functioning below baseline level, and remains limited 2* factors listed above. PT will continue to see pt during current hospitalization in order to address the deficits listed above and provide interventions consistent w/ POC in effort to achieve STGs. PT recommends level 2, moderate resource intensity upon discharge.   Barriers to Discharge Inaccessible home environment   Goals   Patient Goals none expressed by pt re: therapy outcomes   STG Expiration Date 02/24/24   Short Term Goal #1 goals remain appropriate   PT Treatment Day 1   Plan   Treatment/Interventions Functional transfer training;LE strengthening/ROM;Elevations;Therapeutic exercise;Endurance training;Cognitive reorientation;Patient/family training;Equipment eval/education;Bed mobility;Gait training;Spoke to nursing   Progress Progressing toward goals   PT Frequency 3-5x/wk   Discharge Recommendation   Rehab Resource Intensity Level, PT II (Moderate Resource Intensity)   Equipment Recommended Walker  (continue RW use)   Walker Package Recommended Wheeled walker   Additional Comments Pt's raw score on the AM-[facility] Basic Mobility inpatient short form is 17, standardized score is 39.67. Patients at this level are likely to benefit from DC to post acute rehabilitation services, however, please refer to therapist recommendation for safe DC planning.   AM-PAC Basic Mobility Inpatient   Turning in Flat Bed Without Bedrails 3   Lying on Back to Sitting on Edge of Flat Bed Without Bedrails 3   Moving Bed to Chair 3   Standing Up From Chair Using Arms 3   Walk in Room 3   Climb 3-5 Stairs With Railing 2   Basic Mobility Inpatient Raw Score 17   Basic Mobility Standardized Score 39.67   Highest Level Of Mobility   JH-HLM Goal 5: Stand one or more mins   JH-HLM Achieved 7: Walk 25 feet or more   Education   Education Provided Mobility  training;Assistive device   Patient Reinforcement needed   End of Consult   Patient Position at End of Consult Bedside chair;Bed/Chair alarm activated;All needs within reach       Saleem Salcido PT, DPT    Time of PT treatment session: 0734-0813 (total treatment time = 39 minutes)

## 2024-02-15 NOTE — ASSESSMENT & PLAN NOTE
Patient with history of dysphagia, barium swallow performed a few months ago  Swallow eval appreciated, patient had video swallow yesterday  Speech therapy recommending puréed diet with thin liquids  Aspiration precautions

## 2024-02-15 NOTE — ASSESSMENT & PLAN NOTE
Patient with difficulty walking and lower extremity weakness  CT head: Thin subdural density measuring 3 mm in the left frontal region unchanged, previously attributed to subdural hematoma but could represent simple dural thickening given the chronicity.   CXR: No acute cardiopulmonary disease.   COVID/Flu/RSV- negative  PT/OT consult recommended rehab  Family concern for possible component of depression.  Psychiatry input appreciated.  Zoloft added 25 mg daily.  Follow-up B12 level  CM for likely STR placement

## 2024-02-15 NOTE — CASE MANAGEMENT
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 2023    Traumatic subdural hemorrhage with loss of consciousness of unspecified duration, initial encounter ([facility]) 2023    Sensorineural hearing loss (SNHL), bilateral 2022    Balance disorder 2022    Abnormal echocardiogram 2022    Right bundle-branch block 2022    Moderate protein-calorie malnutrition (HCC) 2022    Hypotension 2022    Diastolic dysfunction 2022    EKG abnormalities 2022    Constipation 2022    SDH (subdural hematoma) (HCC) 2022    Primary osteoarthritis of left knee 2022    Hygroma 2022    Right hand pain     Carpal tunnel syndrome on right     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HCC) 2021    Overweight (BMI 25.0-29.9) 2021    Negative depression screening 2019    Hypercholesterolemia 2018    Borderline hypertension 2018    Hearing loss 2009      LOS (days): 1  Geometric Mean LOS (GMLOS) (days): 2.6  Days to GMLOS:1.3     OBJECTIVE:  Risk of Unplanned Readmission Score: 11.12         Current admission status: Inpatient   Preferred Pharmacy:   [id number]  [place] - [id number]  [address] [address]  Phone: [phone] Fax: [phone]    Primary Care Provider: RONY Diaz    Primary Insurance: MEDICARE  Secondary Insurance: [facility]    DISCHARGE DETAILS:    Discharge planning discussed with:: alek called  Allegra at 10:22 am & 15:25 pm  Freedom of Choice: Yes  Comments - Freedom of Choice: recommendation is for level ll- rehab- rehab- cm reviewed [facility] list of accepting facilities with daughter -  she will call me back with he choice of snf facility  CM contacted family/caregiver?: Yes             Contacts  Patient Contacts: Allegra Chase  Relationship to Patient:: Family (wife)  Contact Method: Phone  Phone Number: [phone]  Reason/Outcome: Discharge Planning    Requested Home Health Care         Is the patient interested in HHC at discharge?: No    DME Referral Provided  Referral made for DME?: No    Other Referral/Resources/Interventions Provided:  Interventions: Short Term Rehab  Referral Comments: referrals [facility] for snf rehab - list given to the family- cm will receive a call back from with the choice of snf    Would you like to participate in our [facility] service program?  : No - Declined    Treatment Team Recommendation: Short Term Rehab (snf rehab - TBD)

## 2024-02-15 NOTE — ASSESSMENT & PLAN NOTE
With significant degenerative changes and disc disease noted  No alarm symptoms at this time  Patient does follow with neurosurgery as outpatient  Plan to discharge to rehab, if no improvement and mobility can consider outpatient neurosurgery/orthospine follow-up  Reviewed findings with patient's daughter over the phone

## 2024-02-15 NOTE — PROGRESS NOTES
[facility]  Progress Note  Name: Thomas Chase I  MRN: [id number]  Unit/Bed#: -01 I Date of Admission: 2/13/2024   Date of Service: 2/15/2024 I Hospital Day: 1    Assessment/Plan   * Debility  Assessment & Plan  Patient with difficulty walking and lower extremity weakness  CT head: Thin subdural density measuring 3 mm in the left frontal region unchanged, previously attributed to subdural hematoma but could represent simple dural thickening given the chronicity.   CXR: No acute cardiopulmonary disease.   COVID/Flu/RSV- negative  PT/OT consult recommended rehab  Family concern for possible component of depression.  Psychiatry input appreciated.  Zoloft added 25 mg daily.  Follow-up B12 level  CM for likely STR placement    Abnormal CT scan, lumbar spine  Assessment & Plan  With significant degenerative changes and disc disease noted  No alarm symptoms at this time  Patient does follow with neurosurgery as outpatient  Plan to discharge to rehab, if no improvement and mobility can consider outpatient neurosurgery/orthospine follow-up  Reviewed findings with patient's daughter over the phone    Severe protein-calorie malnutrition (HCC)  Assessment & Plan  Malnutrition Findings:   Adult Malnutrition type: Chronic illness  Adult Degree of Malnutrition: Other severe protein calorie malnutrition  Malnutrition Characteristics: Fat loss, Muscle loss, Weight loss                  360 Statement: Severe protein calorie malnutrition related to chronic illness as evidenced by muscle wasting to clavicle and temple regions, subcutaneous fat loss to orbital and buccal regions, and significant weight loss of 13.1% (19#) within 6 months. Treating with oral diet and nutrition supplements.    BMI Findings:  Adult BMI Classifications: Underweight < 18.5        Body mass index is 17.19 kg/m².       Dysphagia  Assessment & Plan  Patient with history of dysphagia, barium swallow performed a few months  ago  Swallow eval appreciated, patient had video swallow yesterday  Speech therapy recommending puréed diet with thin liquids  Aspiration precautions    Moderate protein-calorie malnutrition (HCC)  Assessment & Plan  Body mass index is 17.19 kg/m².     Consult nutrition services     Hearing loss  Assessment & Plan  Patient has a Left cochlear implant.     SDH (subdural hematoma) (HCC)  Assessment & Plan  History of subdural hematoma  The patient was taken to OR 5/28/22 for L craniotomy with SD drain placement x 2  CT head (2/13): Thin subdural density measuring 3 mm in the left frontal region unchanged, previously attributed to subdural hematoma but could represent simple dural thickening given the chronicity  Will avoid anticoagulants for now    Hypercholesterolemia  Assessment & Plan  Continue statin therapy             VTE Pharmacologic Prophylaxis: VTE Score: 3 Moderate Risk (Score 3-4) - Pharmacological DVT Prophylaxis Contraindicated. Sequential Compression Devices Ordered.  History of subdural hematoma, DVT prophylaxis held for now    Mobility:   Basic Mobility Inpatient Raw Score: 17  JH-HLM Goal: 5: Stand one or more mins  JH-HLM Achieved: 7: Walk 25 feet or more  HLM Goal achieved. Continue to encourage appropriate mobility.    Patient Centered Rounds: I performed bedside rounds with nursing staff today.   Discussions with Specialists or Other Care Team Provider: yes    Education and Discussions with Family / Patient: Updated  (wife) at bedside.  Also spoke with patient's daughter Allegra over the phone    Current Length of Stay: 1 day(s)  Current Patient Status: Inpatient   Certification Statement: The patient will continue to require additional inpatient hospital stay due to pending placement, debility  Discharge Plan: Anticipate discharge in 24-48 hrs to rehab facility.    Code Status: Level 3 - DNAR and DNI    Subjective:   No overnight events noted.  Patient had swallow evaluation done  yesterday, recommending puréed diet    Objective:     Vitals:   Temp (24hrs), Av.2 °F (36.8 °C), Min:97.8 °F (36.6 °C), Max:98.9 °F (37.2 °C)    Temp:  [97.8 °F (36.6 °C)-98.9 °F (37.2 °C)] 97.9 °F (36.6 °C)  HR:  [70-87] 87  Resp:  [18] 18  BP: ()/(51-83) 121/69  SpO2:  [94 %-98 %] 96 %  Body mass index is 17.19 kg/m².     Input and Output Summary (last 24 hours):     Intake/Output Summary (Last 24 hours) at 2/15/2024 1224  Last data filed at 2/15/2024 1213  Gross per 24 hour   Intake 1440 ml   Output 1200 ml   Net 240 ml       Physical Exam:   Physical Exam  Constitutional:       General: He is not in acute distress.  HENT:      Head: Normocephalic and atraumatic.      Ears:      Comments: Hearing difficulty, cochlear implant in place     Nose: Nose normal.      Mouth/Throat:      Mouth: Mucous membranes are moist.   Eyes:      Extraocular Movements: Extraocular movements intact.      Conjunctiva/sclera: Conjunctivae normal.   Cardiovascular:      Rate and Rhythm: Normal rate and regular rhythm.   Pulmonary:      Effort: Pulmonary effort is normal. No respiratory distress.   Abdominal:      Palpations: Abdomen is soft.      Tenderness: There is no abdominal tenderness.   Musculoskeletal:         General: Normal range of motion.      Cervical back: Normal range of motion and neck supple.      Comments: Generalized weakness   Skin:     General: Skin is warm and dry.   Neurological:      Mental Status: He is alert.      Comments: Patient is awake and alert.  Following simple commands.  Sensation intact of bilateral lower extremities.  Patient moving bilateral lower extremities but does have generalized weakness   Psychiatric:         Mood and Affect: Mood normal.         Behavior: Behavior normal.          Additional Data:     Labs:  Results from last 7 days   Lab Units 02/15/24  0424 24  0430 24  1443   WBC Thousand/uL 6.18   < > 5.49   HEMOGLOBIN g/dL 13.7   < > 12.9   HEMATOCRIT % 41.2   < >  39.3   PLATELETS Thousands/uL 440*   < > 405*   NEUTROS PCT %  --   --  77*   LYMPHS PCT %  --   --  12*   MONOS PCT %  --   --  11   EOS PCT %  --   --  0    < > = values in this interval not displayed.     Results from last 7 days   Lab Units 02/15/24  0424 02/14/24  0430   SODIUM mmol/L 139 139   POTASSIUM mmol/L 4.3 4.1   CHLORIDE mmol/L 103 104   CO2 mmol/L 30 27   BUN mg/dL 17 15   CREATININE mg/dL 0.58* 0.56*   ANION GAP mmol/L 6 8   CALCIUM mg/dL 8.6 9.2   ALBUMIN g/dL  --  3.8   TOTAL BILIRUBIN mg/dL  --  0.55   ALK PHOS U/L  --  87   ALT U/L  --  23   AST U/L  --  42*   GLUCOSE RANDOM mg/dL 92 93                       Lines/Drains:  Invasive Devices       Peripheral Intravenous Line  Duration             Peripheral IV 02/13/24 Right Hand 1 day                    Imaging: Reviewed radiology reports from this admission including: CT lumbar spine    Recent Cultures (last 7 days):         Last 24 Hours Medication List:   Current Facility-Administered Medications   Medication Dose Route Frequency Provider Last Rate    acetaminophen  650 mg Oral Q4H PRN [place] PA-C      multivitamin stress formula  1 tablet Oral Daily [place] PA-C      multivitamin-minerals  1 tablet Oral Daily [place] PA-C      ondansetron  4 mg Intravenous Q6H PRN [place] PA-C      pravastatin  40 mg Oral Daily With Dinner [place] Mal[place] PA-C      predniSONE  40 mg Oral Daily Shaista Yu MD      sertraline  25 mg Oral Daily Shaista Yu MD          Today, Patient Was Seen By: Shaista Yu MD    **Please Note: This note may have been constructed using a voice recognition system.**

## 2024-02-16 ENCOUNTER — TRANSITIONAL CARE MANAGEMENT (OUTPATIENT)
Dept: FAMILY MEDICINE CLINIC | Facility: CLINIC | Age: 84
End: 2024-02-16

## 2024-02-16 ENCOUNTER — HOSPITAL ENCOUNTER (INPATIENT)
Facility: HOSPITAL | Age: 84
LOS: 4 days | DRG: 085 | End: 2024-02-20
Attending: SURGERY | Admitting: SURGERY
Payer: MEDICARE

## 2024-02-16 ENCOUNTER — APPOINTMENT (INPATIENT)
Dept: RADIOLOGY | Facility: HOSPITAL | Age: 84
DRG: 085 | End: 2024-02-16
Payer: MEDICARE

## 2024-02-16 ENCOUNTER — APPOINTMENT (INPATIENT)
Dept: CT IMAGING | Facility: HOSPITAL | Age: 84
DRG: 555 | End: 2024-02-16
Payer: MEDICARE

## 2024-02-16 VITALS
SYSTOLIC BLOOD PRESSURE: 128 MMHG | BODY MASS INDEX: 17.17 KG/M2 | DIASTOLIC BLOOD PRESSURE: 59 MMHG | RESPIRATION RATE: 18 BRPM | OXYGEN SATURATION: 96 % | WEIGHT: 126.76 LBS | HEART RATE: 85 BPM | TEMPERATURE: 98.3 F | HEIGHT: 72 IN

## 2024-02-16 DIAGNOSIS — I62.9 INTRACRANIAL HEMORRHAGE (HCC): Primary | ICD-10-CM

## 2024-02-16 DIAGNOSIS — R53.81 DEBILITY: ICD-10-CM

## 2024-02-16 DIAGNOSIS — I60.9 SUBARACHNOID HEMORRHAGE (HCC): ICD-10-CM

## 2024-02-16 LAB
ABO GROUP BLD: NORMAL
ALBUMIN SERPL BCP-MCNC: 3.5 G/DL (ref 3.5–5)
ALP SERPL-CCNC: 79 U/L (ref 34–104)
ALT SERPL W P-5'-P-CCNC: 33 U/L (ref 7–52)
ANION GAP SERPL CALCULATED.3IONS-SCNC: 9 MMOL/L
AST SERPL W P-5'-P-CCNC: 36 U/L (ref 13–39)
BASOPHILS # BLD AUTO: 0.01 THOUSANDS/ÂΜL (ref 0–0.1)
BASOPHILS NFR BLD AUTO: 0 % (ref 0–1)
BILIRUB SERPL-MCNC: 0.48 MG/DL (ref 0.2–1)
BLD GP AB SCN SERPL QL: NEGATIVE
BUN SERPL-MCNC: 14 MG/DL (ref 5–25)
CALCIUM SERPL-MCNC: 8.5 MG/DL (ref 8.4–10.2)
CHLORIDE SERPL-SCNC: 100 MMOL/L (ref 96–108)
CO2 SERPL-SCNC: 26 MMOL/L (ref 21–32)
CREAT SERPL-MCNC: 0.53 MG/DL (ref 0.6–1.3)
EOSINOPHIL # BLD AUTO: 0.01 THOUSAND/ÂΜL (ref 0–0.61)
EOSINOPHIL NFR BLD AUTO: 0 % (ref 0–6)
ERYTHROCYTE [DISTWIDTH] IN BLOOD BY AUTOMATED COUNT: 13.5 % (ref 11.6–15.1)
GFR SERPL CREATININE-BSD FRML MDRD: 97 ML/MIN/1.73SQ M
GLUCOSE SERPL-MCNC: 119 MG/DL (ref 65–140)
HCT VFR BLD AUTO: 37.3 % (ref 36.5–49.3)
HGB BLD-MCNC: 12.6 G/DL (ref 12–17)
IMM GRANULOCYTES # BLD AUTO: 0.06 THOUSAND/UL (ref 0–0.2)
IMM GRANULOCYTES NFR BLD AUTO: 1 % (ref 0–2)
LYMPHOCYTES # BLD AUTO: 0.9 THOUSANDS/ÂΜL (ref 0.6–4.47)
LYMPHOCYTES NFR BLD AUTO: 9 % (ref 14–44)
MCH RBC QN AUTO: 31.5 PG (ref 26.8–34.3)
MCHC RBC AUTO-ENTMCNC: 33.8 G/DL (ref 31.4–37.4)
MCV RBC AUTO: 93 FL (ref 82–98)
MONOCYTES # BLD AUTO: 0.81 THOUSAND/ÂΜL (ref 0.17–1.22)
MONOCYTES NFR BLD AUTO: 8 % (ref 4–12)
NEUTROPHILS # BLD AUTO: 8.61 THOUSANDS/ÂΜL (ref 1.85–7.62)
NEUTS SEG NFR BLD AUTO: 82 % (ref 43–75)
NRBC BLD AUTO-RTO: 0 /100 WBCS
PLATELET # BLD AUTO: 433 THOUSANDS/UL (ref 149–390)
PMV BLD AUTO: 9 FL (ref 8.9–12.7)
POTASSIUM SERPL-SCNC: 4 MMOL/L (ref 3.5–5.3)
PROT SERPL-MCNC: 6.3 G/DL (ref 6.4–8.4)
RBC # BLD AUTO: 4 MILLION/UL (ref 3.88–5.62)
RH BLD: POSITIVE
SODIUM SERPL-SCNC: 135 MMOL/L (ref 135–147)
SPECIMEN EXPIRATION DATE: NORMAL
WBC # BLD AUTO: 10.4 THOUSAND/UL (ref 4.31–10.16)

## 2024-02-16 PROCEDURE — 92610 EVALUATE SWALLOWING FUNCTION: CPT

## 2024-02-16 PROCEDURE — 86900 BLOOD TYPING SEROLOGIC ABO: CPT

## 2024-02-16 PROCEDURE — 85025 COMPLETE CBC W/AUTO DIFF WBC: CPT

## 2024-02-16 PROCEDURE — 36415 COLL VENOUS BLD VENIPUNCTURE: CPT

## 2024-02-16 PROCEDURE — 99223 1ST HOSP IP/OBS HIGH 75: CPT | Performed by: STUDENT IN AN ORGANIZED HEALTH CARE EDUCATION/TRAINING PROGRAM

## 2024-02-16 PROCEDURE — 72125 CT NECK SPINE W/O DYE: CPT

## 2024-02-16 PROCEDURE — 99223 1ST HOSP IP/OBS HIGH 75: CPT | Performed by: NEUROLOGICAL SURGERY

## 2024-02-16 PROCEDURE — 70450 CT HEAD/BRAIN W/O DYE: CPT

## 2024-02-16 PROCEDURE — 99239 HOSP IP/OBS DSCHRG MGMT >30: CPT | Performed by: PHYSICIAN ASSISTANT

## 2024-02-16 PROCEDURE — 99285 EMERGENCY DEPT VISIT HI MDM: CPT

## 2024-02-16 PROCEDURE — G1004 CDSM NDSC: HCPCS

## 2024-02-16 PROCEDURE — 99291 CRITICAL CARE FIRST HOUR: CPT | Performed by: SURGERY

## 2024-02-16 PROCEDURE — 86850 RBC ANTIBODY SCREEN: CPT

## 2024-02-16 PROCEDURE — 80053 COMPREHEN METABOLIC PANEL: CPT

## 2024-02-16 PROCEDURE — 86901 BLOOD TYPING SEROLOGIC RH(D): CPT

## 2024-02-16 RX ORDER — LEVETIRACETAM 500 MG/1
500 TABLET ORAL EVERY 12 HOURS SCHEDULED
Status: DISCONTINUED | OUTPATIENT
Start: 2024-02-16 | End: 2024-02-16

## 2024-02-16 RX ORDER — SERTRALINE HYDROCHLORIDE 25 MG/1
25 TABLET, FILM COATED ORAL DAILY
Status: DISCONTINUED | OUTPATIENT
Start: 2024-02-16 | End: 2024-02-20 | Stop reason: HOSPADM

## 2024-02-16 RX ORDER — LORATADINE 10 MG/1
10 TABLET ORAL DAILY
Status: DISCONTINUED | OUTPATIENT
Start: 2024-02-16 | End: 2024-02-20 | Stop reason: HOSPADM

## 2024-02-16 RX ORDER — LEVETIRACETAM 500 MG/5ML
500 INJECTION, SOLUTION, CONCENTRATE INTRAVENOUS EVERY 12 HOURS SCHEDULED
Status: DISCONTINUED | OUTPATIENT
Start: 2024-02-16 | End: 2024-02-19

## 2024-02-16 RX ORDER — ACETAMINOPHEN 325 MG/1
650 TABLET ORAL EVERY 4 HOURS PRN
Status: CANCELLED | OUTPATIENT
Start: 2024-02-16

## 2024-02-16 RX ORDER — PRAVASTATIN SODIUM 40 MG
40 TABLET ORAL
Status: DISCONTINUED | OUTPATIENT
Start: 2024-02-16 | End: 2024-02-20 | Stop reason: HOSPADM

## 2024-02-16 RX ORDER — LANOLIN ALCOHOL/MO/W.PET/CERES
6 CREAM (GRAM) TOPICAL
Status: CANCELLED | OUTPATIENT
Start: 2024-02-16

## 2024-02-16 RX ORDER — PREDNISONE 20 MG/1
40 TABLET ORAL DAILY
Status: CANCELLED | OUTPATIENT
Start: 2024-02-16 | End: 2024-02-18

## 2024-02-16 RX ORDER — PRAVASTATIN SODIUM 40 MG
40 TABLET ORAL
Status: CANCELLED | OUTPATIENT
Start: 2024-02-16

## 2024-02-16 RX ORDER — PRAVASTATIN SODIUM 40 MG
40 TABLET ORAL
Status: DISCONTINUED | OUTPATIENT
Start: 2024-02-16 | End: 2024-02-16

## 2024-02-16 RX ORDER — SERTRALINE HYDROCHLORIDE 25 MG/1
25 TABLET, FILM COATED ORAL DAILY
Status: CANCELLED | OUTPATIENT
Start: 2024-02-16

## 2024-02-16 RX ORDER — ACETAMINOPHEN 325 MG/1
650 TABLET ORAL EVERY 6 HOURS PRN
Status: CANCELLED | OUTPATIENT
Start: 2024-02-16

## 2024-02-16 RX ORDER — LANOLIN ALCOHOL/MO/W.PET/CERES
6 CREAM (GRAM) TOPICAL
Status: DISCONTINUED | OUTPATIENT
Start: 2024-02-16 | End: 2024-02-20 | Stop reason: HOSPADM

## 2024-02-16 RX ORDER — ACETAMINOPHEN 325 MG/1
650 TABLET ORAL EVERY 6 HOURS PRN
Status: DISCONTINUED | OUTPATIENT
Start: 2024-02-16 | End: 2024-02-16

## 2024-02-16 RX ORDER — ACETAMINOPHEN 325 MG/1
650 TABLET ORAL EVERY 4 HOURS PRN
Status: DISCONTINUED | OUTPATIENT
Start: 2024-02-16 | End: 2024-02-16

## 2024-02-16 RX ORDER — SENNOSIDES 8.6 MG
1 TABLET ORAL
Status: DISCONTINUED | OUTPATIENT
Start: 2024-02-16 | End: 2024-02-20 | Stop reason: HOSPADM

## 2024-02-16 RX ORDER — LORATADINE 10 MG/1
10 TABLET ORAL DAILY
Status: CANCELLED | OUTPATIENT
Start: 2024-02-16

## 2024-02-16 RX ORDER — SENNOSIDES 8.6 MG
1 TABLET ORAL
Status: CANCELLED | OUTPATIENT
Start: 2024-02-16

## 2024-02-16 RX ORDER — ACETAMINOPHEN 325 MG/1
975 TABLET ORAL EVERY 8 HOURS
Status: DISCONTINUED | OUTPATIENT
Start: 2024-02-16 | End: 2024-02-20 | Stop reason: HOSPADM

## 2024-02-16 RX ORDER — BISACODYL 5 MG/1
5 TABLET, DELAYED RELEASE ORAL DAILY PRN
Status: DISCONTINUED | OUTPATIENT
Start: 2024-02-16 | End: 2024-02-16

## 2024-02-16 RX ORDER — DOCUSATE SODIUM 100 MG/1
100 CAPSULE, LIQUID FILLED ORAL 2 TIMES DAILY
Status: DISCONTINUED | OUTPATIENT
Start: 2024-02-16 | End: 2024-02-20 | Stop reason: HOSPADM

## 2024-02-16 RX ORDER — DOCUSATE SODIUM 100 MG/1
100 CAPSULE, LIQUID FILLED ORAL 2 TIMES DAILY
Status: CANCELLED | OUTPATIENT
Start: 2024-02-16

## 2024-02-16 RX ORDER — ONDANSETRON 2 MG/ML
4 INJECTION INTRAMUSCULAR; INTRAVENOUS EVERY 6 HOURS PRN
Status: CANCELLED | OUTPATIENT
Start: 2024-02-16

## 2024-02-16 RX ORDER — ONDANSETRON 2 MG/ML
4 INJECTION INTRAMUSCULAR; INTRAVENOUS EVERY 6 HOURS PRN
Status: DISCONTINUED | OUTPATIENT
Start: 2024-02-16 | End: 2024-02-20 | Stop reason: HOSPADM

## 2024-02-16 RX ADMIN — LORATADINE 10 MG: 10 TABLET ORAL at 11:14

## 2024-02-16 RX ADMIN — DOCUSATE SODIUM 100 MG: 100 CAPSULE, LIQUID FILLED ORAL at 11:12

## 2024-02-16 RX ADMIN — Medication 1 TABLET: at 11:13

## 2024-02-16 RX ADMIN — SERTRALINE HYDROCHLORIDE 25 MG: 25 TABLET ORAL at 11:11

## 2024-02-16 RX ADMIN — LEVETIRACETAM 500 MG: 100 INJECTION, SOLUTION INTRAVENOUS at 08:57

## 2024-02-16 RX ADMIN — SENNOSIDES 8.6 MG: 8.6 TABLET, FILM COATED ORAL at 22:13

## 2024-02-16 RX ADMIN — PRAVASTATIN SODIUM 40 MG: 40 TABLET ORAL at 16:50

## 2024-02-16 RX ADMIN — LEVETIRACETAM 500 MG: 100 INJECTION, SOLUTION INTRAVENOUS at 20:11

## 2024-02-16 RX ADMIN — Medication 6 MG: at 22:13

## 2024-02-16 RX ADMIN — ACETAMINOPHEN 975 MG: 325 TABLET, FILM COATED ORAL at 11:13

## 2024-02-16 RX ADMIN — ACETAMINOPHEN 975 MG: 325 TABLET, FILM COATED ORAL at 22:18

## 2024-02-16 NOTE — ED NOTES
Dr. Jimenez from trauma [place] texted that pt failed hid dysphagia screening and all his meds are ordered oral.     Latasha Pop RN  02/16/24 9100

## 2024-02-16 NOTE — ASSESSMENT & PLAN NOTE
CT head: 2/16 Small volume suspected acute subarachnoid hemorrhage within the left frontal/opercular region. New.   - Neuro checks  -SD2  - NSGY consult  - Keppra BID  - SBP < 180

## 2024-02-16 NOTE — H&P
[facility]  H&P  Name: Thomas Chase 83 y.o. male I MRN: [id number]  Unit/Bed#: ED 15 I Date of Admission: 2/16/2024   Date of Service: 2/16/2024 I Hospital Day: 0      Assessment/Plan   Intracranial hemorrhage (HCC)  Assessment & Plan  CT head: 2/16 Small volume suspected acute subarachnoid hemorrhage within the left frontal/opercular region. New.   - Neuro checks  -SD2  - NSGY consult  - Keppra BID  - SBP < 180    Debility  Assessment & Plan  - PT/OT  - geriatric consult    SDH (subdural hematoma) (HCC)  Assessment & Plan  History of subdural hematoma  The patient was taken to OR 5/28/22 for L craniotomy with SD drain placement x 2  CT head (2/13): Thin subdural density measuring 3 mm in the left frontal region unchanged, previously attributed to subdural hematoma but could represent simple dural thickening given the chronicity  Will avoid anticoagulants for now    Hypercholesterolemia  Assessment & Plan  - Continue home simvastatin             Trauma Alert: Other fall inpatient    Model of Arrival: Transfer from [place]     Trauma Team: Attending Christiano, Residents Barbara, and Fellow Radha  Consultants:     Neurosurgery: routine consult; Epic consult order placed;     History of Present Illness     Chief Complaint: Subarachanoid hemorrhage  Mechanism:Fall     HPI:    Thomas Chase is a 83 y.o. male who presents with Fall.  Patient was originally admitted to [facility] on 2/13 for debility.  Patient had an unwitnessed fall.  Patient was found by nursing staff patient lying on the floor.  Rapid response was called and CT head and CT C-spine was ordered.  CT head showed left-sided acute subarachnoid hemorrhage.  Hx limited due to patient's baseline aphasia. Patient was transferred to [facility] for trauma evaluation    Review of Systems   Unable to perform ROS: Patient nonverbal     12-point, complete review of systems was reviewed and negative except as stated above.      Historical Information     Past Medical History:   Diagnosis Date    Arthritis     Encounter for general adult medical examination without abnormal findings 03/20/2019    Fall 11/03/2022    Hyperlipidemia     Macular degeneration, wet (HCC)     Urinary retention 06/02/2022     Past Surgical History:   Procedure Laterality Date    BRAIN HEMATOMA EVACUATION Left 05/28/2022    Procedure: left CRANIOTOMY FOR SUBDURAL HEMATOMA;  Surgeon: Chris Watts MD;  Location:  MAIN OR;  Service: Neurosurgery    CARPAL TUNNEL RELEASE      HERNIA REPAIR Right     inguinal    IR CEREBRAL ANGIOGRAPHY / INTERVENTION  06/02/2022    ME COCHLEAR DEVICE IMPLANTATION W/WO MASTOIDECTOMY Left 1/17/2023    Procedure: LEFT COCHLEAR IMPLANTATION WITH MASTOIDECTOMY AND FACIAL NERVE MONITORING WITH AUDIOMETRIC TESTING OF THE IMPLANT;  Surgeon: Susie Tuttle MD;  Location:  MAIN OR;  Service: ENT    ME NEUROPLASTY &/TRANSPOS MEDIAN NRV CARPAL TUNNE Right 09/20/2021    Procedure: RELEASE CARPAL TUNNEL;  Surgeon: Bruno Segovia MD;  Location: [facility] MAIN OR;  Service: Orthopedics        Social History     Tobacco Use    Smoking status: Former    Smokeless tobacco: Never    Tobacco comments:     Smoked as a teenager   Vaping Use    Vaping status: Never Used   Substance Use Topics    Alcohol use: Yes     Alcohol/week: 3.0 standard drinks of alcohol     Types: 3 Cans of beer per week    Drug use: No     Immunization History   Administered Date(s) Administered    COVID-19 MODERNA VACC 0.25 ML IM BOOSTER 11/01/2021    COVID-19 MODERNA VACC 0.5 ML IM 01/19/2021, 02/17/2021    INFLUENZA 10/11/2017, 09/05/2019, 10/26/2022    Influenza, high dose seasonal 0.7 mL 09/20/2018, 10/12/2020, 12/06/2021, 10/26/2022, 10/27/2023    Pneumococcal 11/12/2012, 01/06/2017    Pneumococcal Conjugate 13-Valent 06/12/2020    Pneumococcal Polysaccharide PPV23 07/08/2021    Tdap 04/08/2021     Last Tetanus: 4/8/2021  Family History: Non-contributory    1.  Before the illness or injury that brought you to the Emergency, did you need someone to help you on a regular basis? 1=Yes   2. Since the illness or injury that brought you to the Emergency, have you needed more help than usual to take care of yourself? 1=Yes   3. Have you been hospitalized for one or more nights during the past 6 months (excluding a stay in the Emergency Department)? 1=Yes   4. In general, do you see well? 0=Yes   5. In general, do you have serious problems with your memory? 1=Yes   6. Do you take more than three different medications everyday? 1=Yes   TOTAL   5     Did you order a geriatric consult if the score was 2 or greater?: yes     Meds/Allergies   all current active meds have been reviewed, current meds:   Current Facility-Administered Medications   Medication Dose Route Frequency    acetaminophen (TYLENOL) tablet 650 mg  650 mg Oral Q4H PRN    acetaminophen (TYLENOL) tablet 650 mg  650 mg Oral Q6H PRN    docusate sodium (COLACE) capsule 100 mg  100 mg Oral BID    levETIRAcetam (KEPPRA) tablet 500 mg  500 mg Oral Q12H JEANNIE    loratadine (CLARITIN) tablet 10 mg  10 mg Oral Daily    melatonin tablet 6 mg  6 mg Oral HS    multivitamin-minerals (CENTRUM) tablet 1 tablet  1 tablet Oral Daily    ondansetron (ZOFRAN) injection 4 mg  4 mg Intravenous Q6H PRN    pravastatin (PRAVACHOL) tablet 40 mg  40 mg Oral Daily With Dinner    pravastatin (PRAVACHOL) tablet 40 mg  40 mg Oral Daily With Dinner    senna (SENOKOT) tablet 8.6 mg  1 tablet Oral HS    sertraline (ZOLOFT) tablet 25 mg  25 mg Oral Daily    stress formula tablet 1 tablet  1 tablet Oral Daily   , and PTA meds:   Prior to Admission Medications   Prescriptions Last Dose Informant Patient Reported? Taking?   Multiple Vitamin (MULTIVITAMIN) tablet  Spouse/Significant Other Yes No   Sig: Take by mouth daily    Multiple Vitamins-Minerals (PRESERVISION AREDS 2 PO)  Spouse/Significant Other Yes No   Sig: Take by mouth   acetaminophen (TYLENOL) 325  mg tablet  Spouse/Significant Other No No   Sig: Take 2 tablets (650 mg total) by mouth every 6 (six) hours as needed for mild pain   loratadine (CLARITIN) 10 mg tablet  Spouse/Significant Other No No   Sig: Take 1 tablet (10 mg total) by mouth daily   Patient not taking: Reported on 2/13/2024   simvastatin (ZOCOR) 20 mg tablet  Spouse/Significant Other No No   Sig: Take 1 tablet (20 mg total) by mouth daily at bedtime      Facility-Administered Medications: None      No Known Allergies    Objective   Initial Vitals:   Temperature: 98.1 °F (36.7 °C) (02/16/24 0314)  Pulse: 83 (02/16/24 0314)  Respirations: 18 (02/16/24 0314)  Blood Pressure: 157/83 (02/16/24 0314)    Primary Survey:   Airway:        Status: patent;        Pre-hospital Interventions: none        Hospital Interventions: none  Breathing:        Pre-hospital Interventions: none       Effort: normal       Right breath sounds: normal       Left breath sounds: normal  Circulation:        Rhythm: regular       Rate: regular   Right Pulses Left Pulses    R radial: 2+    R pedal: 2+     L radial: 2+    L pedal: 2+       Disability:        GCS: Eye: 4; Verbal: 2 Motor: 6 Total: 12       Right Pupil: round;  reactive         Left Pupil:  round;  reactive      R Motor Strength L Motor Strength    R : 5/5  R dorsiflex: 5/5  R plantarflex: 5/5 L : 5/5  L dorsiflex: 5/5  L plantarflex: 5/5        Sensory:  No sensory deficit  Exposure:       Completed: Yes      Secondary Survey:  Physical Exam  Vitals reviewed.   Constitutional:       Appearance: Normal appearance.   HENT:      Head: Normocephalic and atraumatic.      Nose: Nose normal.      Mouth/Throat:      Mouth: Mucous membranes are moist.      Pharynx: Oropharynx is clear.   Eyes:      Extraocular Movements: Extraocular movements intact.      Conjunctiva/sclera: Conjunctivae normal.      Pupils: Pupils are equal, round, and reactive to light.   Cardiovascular:      Rate and Rhythm: Normal rate and  regular rhythm.      Pulses: Normal pulses.      Heart sounds: Normal heart sounds.   Pulmonary:      Effort: Pulmonary effort is normal.      Breath sounds: Normal breath sounds.   Abdominal:      Palpations: Abdomen is soft.      Tenderness: There is no abdominal tenderness.   Musculoskeletal:         General: Normal range of motion.      Cervical back: Normal range of motion. No tenderness.   Skin:     General: Skin is warm and dry.   Neurological:      Mental Status: He is alert. Mental status is at baseline.      Cranial Nerves: No cranial nerve deficit.      Motor: No weakness.      Comments: Aphasic at baseline. Obeys some commands. Moving all 4 extremities. Limited exam secondary to aphasic         Invasive Devices       Peripheral Intravenous Line  Duration             Peripheral IV 02/13/24 Right Hand 2 days                  Lab Results: I have personally reviewed all pertinent laboratory/test results from 02/16/24, including the preceding 24 hours.  Recent Labs     02/14/24  0430 02/15/24  0424   WBC 6.28 6.18   HGB 13.3 13.7   HCT 42.2 41.2   * 440*   SODIUM 139 139   K 4.1 4.3    103   CO2 27 30   BUN 15 17   CREATININE 0.56* 0.58*   GLUC 93 92   MG 2.2  --    PHOS 3.1  --    AST 42*  --    ALT 23  --    ALB 3.8  --    TBILI 0.55  --    ALKPHOS 87  --        Imaging Results: I have personally reviewed pertinent images saved in PACS. CT scan findings (and other pertinent positive findings on images) were discussed with radiology. My interpretation of the images/reports are as follows:  Chest Xray(s): N/A   FAST exam(s): N/A   CT Scan(s): positive for acute findings: New subarachanoid. Old subdural.  Small volume suspected acute subarachnoid hemorrhage within the left frontal/opercular region.  No acute parenchymal hemorrhage or focal mass effect/midline shift.  Stable 3 mm subdural collection along the inferior left frontal convexity.   Additional Xray(s): N/A     Other Studies:     Code  Status: Level 3 - DNAR and DNI  Advance Directive and Living Will:      Power of :    POLST:    I have spent 30 minutes with Patient and family today in which greater than 50% of this time was spent in counseling/coordination of care regarding Documenting in the medical record, Reviewing / ordering tests, medicine, procedures  , and Obtaining or reviewing history  \.

## 2024-02-16 NOTE — POST FALL NOTE
Post Fall Note    Date of Fall: 02/15/24  Observer/Reported time of Fall: 2350  Name of Provider Notified: RRT was called  Time Provider Notified: 2352  Assessment of Patient Injury: Interruption in skin integrity; Bleeding  Post Fall Interventions: Spinal immobilization determined prior to moving patient      Brief Description of Events  Pt was ambulatory with standby assist during day and walked several times with pca and nurse during nightshift. Pt was found on the floor outside of the bathroom after unwitnessed fall. Called RRT as Pt is hard of hearing ans has garbled speech at baseline, so he was unable to answer wether he hit his head. See RRT event report for details.     Last Recorded Vitals  Blood pressure 128/59, pulse 85, temperature 98.3 °F (36.8 °C), resp. rate 18, height 6' (1.829 m), weight 57.5 kg (126 lb 12.2 oz), SpO2 96%.      Principal Problem:    Debility  Active Problems:    Hypercholesterolemia    SDH (subdural hematoma) (HCC)    Hearing loss    Constipation    Moderate protein-calorie malnutrition (HCC)    Dysphagia    Severe protein-calorie malnutrition (HCC)    Abnormal CT scan, lumbar spine

## 2024-02-16 NOTE — ASSESSMENT & PLAN NOTE
- Patient with history of subdural hematoma s/p craniotomy and SD drain placement x 2 in May of 2022  - Persistent thin subdural density vs dural thickening which is unchanged on subsequent CT scans  - Neurosurgery consult and recommendations appreciated

## 2024-02-16 NOTE — ASSESSMENT & PLAN NOTE
CTA 12/17- Enlarging hypodensity within the left anterior lateral temporal lobe. There are 2 punctate hyperdensities noted within the central aspect of this hypodensity which appears to be sparing the cortex. This is less likely to represent ischemia given the lack of cortical involvement and differentials would include primarily nonhemorrhagic contusion or venous infarction  -Discussed new findings with neurology, neurosurgery. Neurology recommends lipid panel, A1c, TTE, infectious workup, and CTH tomorrow  - Continue neurologic checks: Every 1 hours.  - Reversal agent administered: Patient does not take anti-platelet or anti-coagulant medications. No reversal agent indicated.   - CT scan of the head on 2/16 reviewed: Small volume left frontal subarachnoid hemorrhage suspected no midline shift   - Appreciate Neurosurgery evaluation and recommendations.  - Complete 7 day course of Keppra for seizure prophylaxis  - Chemical DVT prophylaxis: Will restart when cleared by Neurosurgery  - Hold all anticoagulants and anti platelet medications for 2 weeks and/or until cleared by Neurosurgery to resume.  - PT and OT (including cognitive evaluation) evaluation and treatment as indicated.

## 2024-02-16 NOTE — ASSESSMENT & PLAN NOTE
3mm left SDH, L frontal SAH s/p fall  H/o L craniotomy for SDH evacuation 5/2022 by DKO, L MMA embolization by MO  No AC/AP medications  Currently at baseline mental status, no focal deficits    Imaging:  CT head w/o, 2/16/24: Small volume suspected acute subarachnoid hemorrhage within the left frontal/opercular region. No acute parenchymal hemorrhage or focal mass effect/midline shift. Stable 3 mm subdural collection along the inferior left frontal convexity.  No skull fracture. New nondisplaced fracture at the right zygomatic arch with small overlying soft tissue contusion  CT head w/o, 2/16/24: Final read pending. Per my interpretation, improvement in SAH/SDH. New right high frontal SAH, not seen on initial CT head.     Plan:  Continue frequent neurological checks.   Repeat CT head in am  STAT CT head with any neurological decline including drop GCS of 2pts within 1 hr.  Recommend SBP <160mmHg.  Keppra per primary team  Hold all therapeutic antiplatelet and anticoagulation medications.   Pain control per primary team  Mobilize with PT/OT  DVT PPX: SCDs, hold pharm ppx until stable scan is obtained  No neurosurgical intervention indicated at this juncture.     Neurosurgery will follow from the periphery and review repeat CT head in am. Please call with questions or concerns.

## 2024-02-16 NOTE — DISCHARGE SUMMARY
Discharging Physician / Practitioner: Enrico Garrett PA-C  PCP: RONY Diaz  Admission Date:   Admission Orders (From admission, onward)       Ordered        02/14/24 0900  Inpatient Admission  Once            02/13/24 1708  Place in Observation  Once                          Discharge Date: 02/16/24    Medical Problems       Resolved Problems  Date Reviewed: 2/13/2024   None         Consultations During Hospital Stay:  Psychiatry    Procedures Performed:   None    Significant Findings / Test Results:   New left-sided intraparenchymal hemorrhage    Incidental Findings:   None    Test Results Pending at Discharge (will require follow up):   None     Outpatient Tests Requested:  None    Complications:    Fall with new left-sided intraparenchymal hemorrhage    Reason for Admission: Debility    Hospital Course:     Thomas Chase is a 83 y.o. male patient who originally presented to the hospital on 2/13/2024 due to debility.  Patient had an unwitnessed fall while boarding was presumed to be attempting to go to the bathroom.  Patient was found by nursing staff after hearing a loud thud was laying on the floor.  Patient was transferred to the ER for a rapid response was called to head and cervical spine CT was ordered.    CT image revealed a new left-sided intraparenchymal hemorrhage and ADT 21 order was entered for transfer to trauma service.  I spoke with trauma fellow who accepted on the behalf of Dr. Alvarado and patient's daughter was updated via phone after his wife did not answer.    Please see above list of diagnoses and related plan for additional information.     Condition at Discharge: fair     Discharge Day Visit / Exam:     Subjective: Patient awake and alert c-collar in place, mild dysarthria which nursing staff states is baseline  Vitals: Blood Pressure: 128/59 (02/16/24 0031)  Pulse: 85 (02/16/24 0031)  Temperature: 98.3 °F (36.8 °C) (02/16/24 0031)  Temp Source: Temporal (02/13/24  1410)  Respirations: 18 (02/16/24 0031)  Height: 6' (182.9 cm) (02/13/24 1814)  Weight - Scale: 57.5 kg (126 lb 12.2 oz) (02/13/24 1814)  SpO2: 96 % (02/16/24 0031)  Exam:   Physical Exam    Discussion with Family: Patient's daughter via phone, updated her on CT findings and plan to transfer to [place] under trauma service    Discharge instructions/Information to patient and family:   See after visit summary for information provided to patient and family.      Provisions for Follow-Up Care:  See after visit summary for information related to follow-up care and any pertinent home health orders.      Disposition:     Acute Care Hospital Transfer to [facility]      Planned Readmission:   [facility] under trauma service Dr. Alvarado excepting     Discharge Statement:  I spent 30 minutes discharging the patient. This time was spent on the day of discharge. I had direct contact with the patient on the day of discharge. Greater than 50% of the total time was spent examining patient, answering all patient questions, arranging and discussing plan of care with patient as well as directly providing post-discharge instructions.  Additional time then spent on discharge activities.    Discharge Medications:  See after visit summary for reconciled discharge medications provided to patient and family.      ** Please Note: This note has been constructed using a voice recognition system **

## 2024-02-16 NOTE — CASE MANAGEMENT
Case Management Discharge Planning Note    Patient name Thomas Chase  Location ED 19/ED 19 MRN [id number]  : 1940 Date 2024       Current Admission Date: 2024  Current Admission Diagnosis:SDH (subdural hematoma) (HCC)   Patient Active Problem List    Diagnosis Date Noted    Intracranial hemorrhage (HCC) 2024    Abnormal CT scan, lumbar spine 02/15/2024    Severe protein-calorie malnutrition (HCC) 2024    Debility 2024    Pharyngeal myoclonus 2023    Dysphagia 2023    Macular degeneration, age related 2023    Traumatic subdural hemorrhage with loss of consciousness of unspecified duration, initial encounter ([facility]) 2023    Sensorineural hearing loss (SNHL), bilateral 2022    Balance disorder 2022    Abnormal echocardiogram 2022    Right bundle-branch block 2022    Moderate protein-calorie malnutrition (HCC) 2022    Hypotension 2022    Diastolic dysfunction 2022    EKG abnormalities 2022    Constipation 2022    SDH (subdural hematoma) (HCC) 2022    Primary osteoarthritis of left knee 2022    Hygroma 2022    Right hand pain     Carpal tunnel syndrome on right     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HCC) 2021    Overweight (BMI 25.0-29.9) 2021    Negative depression screening 2019    Hypercholesterolemia 2018    Borderline hypertension 2018    Hearing loss 2009      LOS (days): 0  Geometric Mean LOS (GMLOS) (days): 2  Days to GMLOS:1.5     OBJECTIVE:  Risk of Unplanned Readmission Score: 12.24         Current admission status: Inpatient   Preferred Pharmacy:   [facility] [place] - [address]  [address] [address]  Phone: [phone] Fax: [phone]    Primary Care Provider: RONY Diaz    Primary Insurance: MEDICARE  Secondary Insurance: [facility]    DISCHARGE  DETAILS:    CM spoke to pt's dtr at length  She would like a referral to  [facility] and if they won't accept, then the family preference is the [place]. CM will place both referrals.

## 2024-02-16 NOTE — CONSULTS
[facility]  Consult  Name: Thomas Chase 83 y.o. male I MRN: [id number]  Unit/Bed#: ED 19 I Date of Admission: 2/16/2024   Date of Service: 2/16/2024 I Hospital Day: 0    Inpatient consult to Neurosurgery  Consult performed by: Federico Slater PA-C  Consult ordered by: Virgil Jimenez MD      Imaging personally reviewed with attending 2/16/24 at 8:15am  Patient examined at bedside 2/16/24 at 9:45am    Assessment/Plan   SDH (subdural hematoma) (HCC)  Assessment & Plan  3mm left SDH, L frontal SAH s/p fall  H/o L craniotomy for SDH evacuation 5/2022 by MEIR, L MMA embolization by MO  No AC/AP medications  Currently at baseline mental status, no focal deficits    Imaging:  CT head w/o, 2/16/24: Small volume suspected acute subarachnoid hemorrhage within the left frontal/opercular region. No acute parenchymal hemorrhage or focal mass effect/midline shift. Stable 3 mm subdural collection along the inferior left frontal convexity.  No skull fracture. New nondisplaced fracture at the right zygomatic arch with small overlying soft tissue contusion  CT head w/o, 2/16/24: Final read pending. Per my interpretation, improvement in SAH/SDH. New right high frontal SAH, not seen on initial CT head.     Plan:  Continue frequent neurological checks.   Repeat CT head in am  STAT CT head with any neurological decline including drop GCS of 2pts within 1 hr.  Recommend SBP <160mmHg.  Keppra per primary team  Hold all therapeutic antiplatelet and anticoagulation medications.   Pain control per primary team  Mobilize with PT/OT  DVT PPX: SCDs, hold pharm ppx until stable scan is obtained  No neurosurgical intervention indicated at this juncture.     Neurosurgery will follow from the periphery and review repeat CT head in am. Please call with questions or concerns.            History of Present Illness     HPI: Thomas Chase is a 83 y.o. year old male with PMH including macular degeneration,  hearing loss s/p L cochlear implant, h/o L SDH evacuation in 2022 by MEIR who presents after a fall out of bed. He was admitted to [facility] for increasing debility and was found on the floor; rapid response was called. Imaging was significant for L SAH and small L SDH. He does not take AC/AP medications at baseline. He is essentially nonverbal, likely secondary to his profound hearing loss.       Review of Systems   Unable to perform ROS: Patient nonverbal   HENT:  Positive for hearing loss.        Historical Information   Past Medical History:   Diagnosis Date    Arthritis     Encounter for general adult medical examination without abnormal findings 03/20/2019    Fall 11/03/2022    Hyperlipidemia     Macular degeneration, wet (HCC)     Urinary retention 06/02/2022     Past Surgical History:   Procedure Laterality Date    BRAIN HEMATOMA EVACUATION Left 05/28/2022    Procedure: left CRANIOTOMY FOR SUBDURAL HEMATOMA;  Surgeon: Chris Watts MD;  Location:  MAIN OR;  Service: Neurosurgery    CARPAL TUNNEL RELEASE      HERNIA REPAIR Right     inguinal    IR CEREBRAL ANGIOGRAPHY / INTERVENTION  06/02/2022    RI COCHLEAR DEVICE IMPLANTATION W/WO MASTOIDECTOMY Left 1/17/2023    Procedure: LEFT COCHLEAR IMPLANTATION WITH MASTOIDECTOMY AND FACIAL NERVE MONITORING WITH AUDIOMETRIC TESTING OF THE IMPLANT;  Surgeon: Susie Tuttle MD;  Location:  MAIN OR;  Service: ENT    RI NEUROPLASTY &/TRANSPOS MEDIAN NRV CARPAL TUNNE Right 09/20/2021    Procedure: RELEASE CARPAL TUNNEL;  Surgeon: Bruno Segovia MD;  Location: [facility] MAIN OR;  Service: Orthopedics     Social History     Substance and Sexual Activity   Alcohol Use Yes    Alcohol/week: 3.0 standard drinks of alcohol    Types: 3 Cans of beer per week     Social History     Substance and Sexual Activity   Drug Use No     Social History     Tobacco Use   Smoking Status Former   Smokeless Tobacco Never   Tobacco Comments    Smoked as a teenager     History  reviewed. No pertinent family history.    Meds/Allergies   all current active meds have been reviewed, current meds:   Current Facility-Administered Medications   Medication Dose Route Frequency    acetaminophen (TYLENOL) tablet 975 mg  975 mg Oral Q8H    docusate sodium (COLACE) capsule 100 mg  100 mg Oral BID    levETIRAcetam (KEPPRA) injection 500 mg  500 mg Intravenous Q12H JEANNIE    loratadine (CLARITIN) tablet 10 mg  10 mg Oral Daily    melatonin tablet 6 mg  6 mg Oral HS    multivitamin-minerals (CENTRUM) tablet 1 tablet  1 tablet Oral Daily    ondansetron (ZOFRAN) injection 4 mg  4 mg Intravenous Q6H PRN    pravastatin (PRAVACHOL) tablet 40 mg  40 mg Oral Daily With Dinner    senna (SENOKOT) tablet 8.6 mg  1 tablet Oral HS    sertraline (ZOLOFT) tablet 25 mg  25 mg Oral Daily   , and PTA meds:   Prior to Admission Medications   Prescriptions Last Dose Informant Patient Reported? Taking?   Multiple Vitamin (MULTIVITAMIN) tablet  Spouse/Significant Other Yes No   Sig: Take by mouth daily    Multiple Vitamins-Minerals (PRESERVISION AREDS 2 PO)  Spouse/Significant Other Yes No   Sig: Take by mouth   acetaminophen (TYLENOL) 325 mg tablet  Spouse/Significant Other No No   Sig: Take 2 tablets (650 mg total) by mouth every 6 (six) hours as needed for mild pain   loratadine (CLARITIN) 10 mg tablet  Spouse/Significant Other No No   Sig: Take 1 tablet (10 mg total) by mouth daily   Patient not taking: Reported on 2/13/2024   simvastatin (ZOCOR) 20 mg tablet  Spouse/Significant Other No No   Sig: Take 1 tablet (20 mg total) by mouth daily at bedtime      Facility-Administered Medications: None     No Known Allergies    Objective   I/O       None            Physical Exam  Vitals and nursing note reviewed.   Constitutional:       Appearance: Normal appearance. He is well-developed and normal weight.   HENT:      Head: Normocephalic and atraumatic.   Eyes:      Pupils: Pupils are equal, round, and reactive to light.       Comments: Tracks around room   Cardiovascular:      Rate and Rhythm: Normal rate.   Pulmonary:      Effort: Pulmonary effort is normal. No respiratory distress.   Abdominal:      Palpations: Abdomen is soft.   Musculoskeletal:         General: Normal range of motion.      Cervical back: Normal range of motion.   Skin:     General: Skin is warm and dry.   Neurological:      Mental Status: He is alert. Mental status is at baseline.      Comments: Patient nearly nonverbal with profound hearing loss  Does not FC but does mimic well  CULP, 5/5 strength throughout except 4/5 RUE (chronic per wife)  Sensation intact  CN 2-12 intact   Psychiatric:         Mood and Affect: Mood normal.         Behavior: Behavior normal.         Thought Content: Thought content normal.         Judgment: Judgment normal.       Neurologic Exam     Cranial Nerves     CN III, IV, VI   Pupils are equal, round, and reactive to light.      Vitals:Blood pressure 116/63, pulse 82, temperature 98.1 °F (36.7 °C), temperature source Oral, resp. rate 18, SpO2 98%.,There is no height or weight on file to calculate BMI.     Lab Results:   Results from last 7 days   Lab Units 02/16/24  0329 02/15/24  0424 02/14/24  0430 02/13/24  1443   WBC Thousand/uL 10.40* 6.18 6.28 5.49   HEMOGLOBIN g/dL 12.6 13.7 13.3 12.9   HEMATOCRIT % 37.3 41.2 42.2 39.3   PLATELETS Thousands/uL 433* 440* 429* 405*   NEUTROS PCT % 82*  --   --  77*   MONOS PCT % 8  --   --  11   EOS PCT % 0  --   --  0     Results from last 7 days   Lab Units 02/16/24  0329 02/15/24  0424 02/14/24  0430   POTASSIUM mmol/L 4.0 4.3 4.1   CHLORIDE mmol/L 100 103 104   CO2 mmol/L 26 30 27   BUN mg/dL 14 17 15   CREATININE mg/dL 0.53* 0.58* 0.56*   CALCIUM mg/dL 8.5 8.6 9.2   ALK PHOS U/L 79  --  87   ALT U/L 33  --  23   AST U/L 36  --  42*     Results from last 7 days   Lab Units 02/14/24  0430   MAGNESIUM mg/dL 2.2     Results from last 7 days   Lab Units 02/14/24  0430   PHOSPHORUS mg/dL 3.1         No  "results found for: \"TROPONINT\"  ABG:No results found for: \"PHART\", \"DLZ9WBC\", \"PO2ART\", \"PTM6SGU\", \"N3JJXLTD\", \"BEART\", \"SOURCE\"    Imaging Studies: I have personally reviewed pertinent reports.  , I have personally reviewed pertinent films in PACS, and I have personally reviewed pertinent films in PACS with a Radiologist.    CT spine cervical wo contrast    Result Date: 2/16/2024  Narrative: CT CERVICAL SPINE - WITHOUT CONTRAST INDICATION:   unwitnessed fall. COMPARISON: 11/10/2023. TECHNIQUE:  CT examination of the cervical spine was performed without intravenous contrast.  Contiguous axial images were obtained. Multiplanar 2D reformatted images were created from the source data. Radiation dose length product (DLP) for this visit:  372 mGy-cm .  This examination, like all CT scans performed in the [facility], was performed utilizing techniques to minimize radiation dose exposure, including the use of iterative reconstruction and automated exposure control. IMAGE QUALITY:  Diagnostic. FINDINGS: ALIGNMENT: Stable alignment including mild grade 1 anterolisthesis C4 upon C5 favor degenerative in etiology. No acute traumatic subluxation. No compression deformity. VERTEBRAE:  No fracture. DEGENERATIVE CHANGES:  Mild multilevel cervical degenerative changes are noted without critical central canal stenosis. PREVERTEBRAL AND PARASPINAL SOFT TISSUES: Unremarkable THORACIC INLET:  Normal.     Impression: No cervical spine fracture or traumatic malalignment. Stable CT C-spine examination compared to prior Workstation performed: [id number]     CT head wo contrast    Result Date: 2/16/2024  Narrative: CT BRAIN - WITHOUT CONTRAST INDICATION:   unwitnessed fall. COMPARISON: 2/13/2024. TECHNIQUE:  CT examination of the brain was performed.  Multiplanar 2D reformatted images were created from the source data. Radiation dose length product (DLP) for this visit:  873 mGy-cm .  This examination, like all CT scans " performed in the [facility], was performed utilizing techniques to minimize radiation dose exposure, including the use of iterative reconstruction and automated exposure control. IMAGE QUALITY:  Diagnostic. FINDINGS: Cochlear implant device overlying the left parietal bone with associated streak artifact slightly limits assessment. PARENCHYMA: Decreased attenuation is noted in periventricular and subcortical white matter demonstrating an appearance that is statistically most likely to represent mild microangiopathic change; this appearance is similar when compared to most recent prior examination. Age-related volume loss. Unchanged chronic infarct right gangliocapsular region No CT signs of acute infarction.  No intracranial mass, mass effect or midline shift.  No acute parenchymal hemorrhage. VENTRICLES AND EXTRA-AXIAL SPACES: Stable ventricular size and configuration. No acute hydrocephalus or intraventricular hemorrhage. Similar 3 mm extra-axial collection along the inferior left frontal convexity. There is new serpiginous hyperdensity within the left frontal/opercular region suspicious for small volume acute subarachnoid hemorrhage. VISUALIZED ORBITS: Intact globes and orbits. No retrobulbar hematoma. PARANASAL SINUSES: Normal visualized paranasal sinuses. CALVARIUM AND EXTRACRANIAL SOFT TISSUES: No acute calvarial abnormality. Remote left craniotomy changes. No discrete scalp hematoma. New thin lucency seen at the right zygomatic arch suspicious for nondisplaced fracture. There is small overlying soft tissue swelling/contusion. Clear tympanomastoid air cells.     Impression: Small volume suspected acute subarachnoid hemorrhage within the left frontal/opercular region. No acute parenchymal hemorrhage or focal mass effect/midline shift. Stable 3 mm subdural collection along the inferior left frontal convexity. No skull fracture. New nondisplaced fracture at the right zygomatic arch with small  overlying soft tissue contusion. Study was marked in EPIC for immediate notification. Workstation performed: [id number]     FL barium swallow video w speech    Result Date: 2/14/2024  Narrative: A video barium swallow study was performed by the Department of Speech Pathology. Please refer to the report for the official interpretation. The images are stored for archival purposes only. Study images were not formally reviewed by the Radiology Department.    CT spine lumbar wo contrast    Result Date: 2/14/2024  Narrative: CT LUMBAR SPINE WITHOUT CONTRAST INDICATION:   Low back pain, increased fracture risk lower ext weakness. COMPARISON: None. TECHNIQUE:  Contiguous axial images through the lumbar spine were obtained. Sagittal and coronal reconstructions were performed. IV Contrast: No contrast Radiation dose length product (DLP) for this visit:  1390.12 mGy-cm .  This examination, like all CT scans performed in the [facility], was performed utilizing techniques to minimize radiation dose exposure, including the use of iterative reconstruction and automated exposure control. IMAGE QUALITY:  Diagnostic. FINDINGS: ALIGNMENT:  There are 5 lumbar type vertebral bodies.  Normal alignment of the lumbar spine.  No spondylolysis or spondylolisthesis. VERTEBRAE:  No fracture.  No suspicious lytic or blastic lesion. DEGENERATIVE CHANGES: Multilevel disc space narrowing with endplate degenerative changes as well as vacuum disc phenomenon at multiple levels. Lower Thoracic spine: Degenerative disc disease and facet arthropathy. Mild foraminal stenosis at T10-11. No significant canal stenosis. L1-2: Mild disc bulge. No significant canal or foraminal stenosis. L2-3: Disc bulge and marginal osteophyte asymmetric to the right. Mild facet arthropathy. Mild indentation of the thecal sac without significant canal stenosis. Moderate foraminal stenosis.. L3-4: Disc bulge and marginal osteophyte with facet arthropathy. Mild  canal stenosis. Moderate to severe foraminal stenosis. L4-5: Disc bulge and marginal osteophyte. Mild facet arthropathy. Mild indentation of the thecal sac without significant canal stenosis. Moderate right and severe left foraminal stenosis. L5-S1: Disc bulge and marginal osteophyte. Mild facet arthropathy. No significant canal stenosis. Moderate foraminal stenosis. PARASPINAL SOFT TISSUES:   Normal. Other: Right basilar atelectasis or scarring. Small bilateral hypodense renal lesions that are incompletely characterized but may represent cysts. Punctate nonobstructing left renal calcifications. Large amount of stool in the rectum.     Impression: No acute fracture. Degenerative spondylosis with mild canal stenosis at L3-4 and multilevel moderate to severe foraminal stenosis. Workstation performed: [id number]     XR chest 1 view portable    Result Date: 2/13/2024  Narrative: XR CHEST PORTABLE INDICATION: generalized weakness. COMPARISON: 11/10/2023 FINDINGS: Clear lungs. No pneumothorax or pleural effusion. Normal cardiomediastinal silhouette. Bones are unremarkable for age. Normal upper abdomen.     Impression: No acute cardiopulmonary disease. Workstation performed: [id number]     CT head without contrast    Result Date: 2/13/2024  Narrative: CT BRAIN - WITHOUT CONTRAST INDICATION:   hx opf subdural and now with significant change in that unable to walk and with change in mentation over last few weeks. COMPARISON: CT brain from 11/10/2023. TECHNIQUE:  CT examination of the brain was performed.  Multiplanar 2D reformatted images were created from the source data. Radiation dose length product (DLP) for this visit:  922.9 mGy-cm .  This examination, like all CT scans performed in the [facility], was performed utilizing techniques to minimize radiation dose exposure, including the use of iterative reconstruction and automated exposure control. IMAGE QUALITY:  Diagnostic. FINDINGS: PARENCHYMA:  Decreased attenuation is noted in periventricular and subcortical white matter demonstrating an appearance that is statistically most likely to represent moderate microangiopathic change; this appearance is similar when compared to most recent prior examination. No CT signs of acute infarction.  No intracranial mass, mass effect or midline shift.  No acute parenchymal hemorrhage. VENTRICLES AND EXTRA-AXIAL SPACES: Ventricles and extra-axial CSF spaces are prominent commensurate with the degree of volume loss.  No hydrocephalus.  No acute extra-axial hemorrhage. Thin subdural density measuring 3 mm in the left frontal region unchanged previously attributed to subdural hematoma but could represent simple dural thickening. VISUALIZED ORBITS: Normal visualized orbits. PARANASAL SINUSES: Normal visualized paranasal sinuses. CALVARIUM AND EXTRACRANIAL SOFT TISSUES: Left cochlear implant.     Impression: Thin subdural density measuring 3 mm in the left frontal region unchanged, previously attributed to subdural hematoma but could represent simple dural thickening given the chronicity. Workstation performed: [id number]   EKG, Pathology, and Other Studies: I have personally reviewed pertinent reports.      VTE Prophylaxis: Sequential compression device (Venodyne)     Code Status: Level 3 - DNAR and DNI  Advance Directive and Living Will:      Power of :    POLST:      Counseling / Coordination of Care  I spent 30 minutes with the patient.

## 2024-02-16 NOTE — SPEECH THERAPY NOTE
Speech-Language Pathology Bedside Swallow Evaluation    Patient Name: Thomas Chase    Today's Date: 2/16/2024               Evaluation was completed and documented by SLP  Laila Freeman.   I have reviewed the note performed and documented by the . I agree with the findings and plan of management for this patient's speech/swallowing needs. I was present and participated in the evaluation/treatment of this patient.    Bialee Nur M.S.CCC-SLP    Pt's wife and son were present in room during assessment. Spouse reported pt has had some dysphagia for several years, and she prepares soft foods for him at home. He does have a chronic cough with intake, however recent CXR was clear. Images from recent MBS at [facility] were reviewed. Suspect some risk for trace aspiration across all consistencies. Based on research re: negative outcomes of aspirating thickened liquids, consider resuming thin liquids with use of safe swallow strategies and frequent/thorough oral care to minimize risks. Family in agreement. ST will f/u.          Problem List  Active Problems:    Hypercholesterolemia    SDH (subdural hematoma) (HCC)    Debility    Intracranial hemorrhage (HCC)    Past Medical History  Past Medical History:   Diagnosis Date    Arthritis     Encounter for general adult medical examination without abnormal findings 03/20/2019    Fall 11/03/2022    Hyperlipidemia     Macular degeneration, wet (HCC)     Urinary retention 06/02/2022     Past Surgical History  Past Surgical History:   Procedure Laterality Date    BRAIN HEMATOMA EVACUATION Left 05/28/2022    Procedure: left CRANIOTOMY FOR SUBDURAL HEMATOMA;  Surgeon: Chris Watts MD;  Location: [facility];  Service: Neurosurgery    CARPAL TUNNEL RELEASE      HERNIA REPAIR Right     inguinal    IR CEREBRAL ANGIOGRAPHY / INTERVENTION  06/02/2022    DC COCHLEAR DEVICE IMPLANTATION W/WO MASTOIDECTOMY Left 1/17/2023    Procedure: LEFT  COCHLEAR IMPLANTATION WITH MASTOIDECTOMY AND FACIAL NERVE MONITORING WITH AUDIOMETRIC TESTING OF THE IMPLANT;  Surgeon: Susie Tuttle MD;  Location: [facility];  Service: ENT    IN NEUROPLASTY &/TRANSPOS MEDIAN NRV CARPAL TUNNE Right 09/20/2021    Procedure: RELEASE CARPAL TUNNEL;  Surgeon: Bruno Segovia MD;  Location: [facility];  Service: Orthopedics       Summary  Pt presented with s/s suggestive of mild oral and suspected mod-severe pharyngeal dysphagia. Symptoms or concerns included reduced but functional bolus formation and transfer, suspected anterior loss/spillage of thin liquids,  suspected pharyngeal swallow delay, suspected pharyngeal residue, and multiple swallows. S/s seen today appear to be consistent with findings of previous MBS which revealed retention in the valleculae w/ all textures/consistencies. In addition, pt's wife reports pt regularly coughs w/ meals (prior to hospitalization) and at times food is expelled from the oral cavity. Wife reported no hx of pneumonia. Suspected pt is able to clear majority of bolus from valleculae. Recent chest XR (2/14/24) showed no acute cardiopulmonary disease.     Risk/s for Aspiration: high     Recommended Diet: puree/level 1 diet and thin liquids   Recommended Form of Meds: crushed with puree   Aspiration precautions and swallowing strategies: only feed when fully alert, slow rate of feeding, small bites/sips, and no straws  Other Recommendations: Continue frequent oral care    Current Medical Status per H&P 2/16/24  Thomas Chase is a 83 y.o. male who presents with Fall.  Patient was originally admitted to [facility] on 2/13 for debility.  Patient had an unwitnessed fall.  Patient was found by nursing staff patient lying on the floor.  Rapid response was called and CT head and CT C-spine was ordered.  CT head showed left-sided acute subarachnoid hemorrhage. Hx limited due to patient's baseline aphasia. Patient was transferred to SLP for trauma  evaluation    Special Studies:  CT head wo contrast 2/16/24  IMPRESSION:  Small volume suspected acute subarachnoid hemorrhage within the left frontal/opercular region.  No acute parenchymal hemorrhage or focal mass effect/midline shift.  Stable 3 mm subdural collection along the inferior left frontal convexity.  No skull fracture. New nondisplaced fracture at the right zygomatic arch with small overlying soft tissue contusion.  XR chest 1 view portable 2/14/24  IMPRESSION:  No acute cardiopulmonary disease.    Previous MBS 2/14/24 at [facility]  Summary:  Images are on PACS for review.   Oral Phase : Pt presented with mild oral dysphagia. Mastication was mildly prolonged however functional. Bolus control, formation, and transfer were WFL/WNL.   Pharyngeal Phase: Pt presented with mod/severe pharyngeal dysphagia. Swallow initiation was delayed resulting in spill to the valleculae with all  trials and min spill to the pyriforms with thin liquids. Hyoid laryngeal elevation and excursion was WFL/WNL. Epiglottic inversion was incomplete.  Tip of epiglottis did not fully invert. Pharyngeal constriction was reduced. Pt had mild to mod vallecular retention with thin liquid trials. Provided with ntl/htl and all solid trials pt had mod to MAX vallecular rentention. Residue from vallecular rentention resulted in some spilling over epiglottis to trace anterior commissure. Trace to mild pyriform retention was present with all consistencies. Trace aspiration occurred with straw sips and Phillip with cup sips of thin intermittently. With trials of Nectar/honey thick liquids trace/mild aspiration occurred and increased epiglottic undercoat. Pt had no sensation of aspiration events. Increased difficulty following commands to utilize safe swallow strategies due to cognitive status and [place].  Cued cough however ineffective in reducing aspiration. As study progressed pt presented with increased wet vocal quality. Pt independently  will initiates multiple swallows (3-4) to clear vallecular residue however are min successful   Per Esophageal screen   Limited screen as pt oropharyngeal dysphagia. Appeared to clear liquids/solids adequately. No residue at completion of study.    Observations:  Pt does have cochlear implant however continues with [place]. Benefits from simple written directions, however will intermittently follow due to cognitive status and [place].     Recommendations:  Diet: Dysphagia 1 Pureed   Liquids: Thin   Meds: Crushed w/ puree  Strategies: SLOW rate, SMALL SIPS, alternate liquids with solids, swallow prior to additonal po, double swallow, effortful swallow. Frequent oral care  Upright position  F/u ST tx: yes  Therapy Prognosis: guarded  Prognosis considerations:age, medical status, cognitive status  Full Supervision  Aspiration Precautions  Reflux Precautions  Consider consult with: Dietary  Results reviewed with: pt, nursing, family, physician, dietician  Aspiration precautions posted.    Social/Education/Vocational Hx:  Pt lives with family    Swallow Information   Current Risks for Dysphagia & Aspiration: CVA, known history of dysphagia, and AMS  Current Symptoms/Concerns: coughing with po and wet vocal quality  Current Diet: NPO   Baseline Diet: regular diet and thin liquids, however, wife tends to give pt a softer diet       Baseline Assessment   Behavior/Cognition: alert  Speech/Language Status: able to follow commands inconsistently and limited verbal output  Patient Positioning: upright in bed  Pain Status/Interventions/Response to Interventions: No report of or nonverbal indications of pain.    Swallow Mechanism Exam  Facial: symmetrical  Labial: decreased coordination  Lingual: WFL  Velum: symmetrical  Mandible: adequate ROM  Dentition: adequate  Vocal quality:aphonic   Volitional Cough: unable to initiate volitional cough (pt has reflexive cough)  Respiratory Status: on RA    Consistencies Assessed and Performance    Consistencies Administered: ice chips, thin liquids, and puree  Materials administered included ice, water via tsp, and apple sauce    Oral Stage: minimal  Bolus formation and transfer were reduced but functional with no significant oral residue noted. No overt s/s reduced oral control.    Pharyngeal Stage: moderate  Swallow Mechanics:  Swallowing initiation appeared delayed w/ multiple swallows. Laryngeal rise was palpated and judged to be adequate. Coughing, throat clearing, and change in vocal quality were noted for thin liquids. For all textures/consistencies, suspected retention in the valleculae w/ lack of epiglottic inversion as seen in previous MBS.     Esophageal Concerns: none reported    Summary and Recommendations (see above)    Results Reviewed with: patient, RN, and family     Treatment Recommended: Yes     Frequency of treatment: 2-3x/wk    Dysphagia LTG  -Patient will demonstrate safe and effective oral intake (without overt s/s significant oral/pharyngeal dysphagia including s/s penetration or aspiration) for the highest appropriate diet level.     Short Term Goals:  -Pt will tolerate Dysphagia 1/pureed diet and thin liquid with no significant s/s oral or pharyngeal dysphagia across 1-3 diagnostic session/s

## 2024-02-16 NOTE — EMTALA/ACUTE CARE TRANSFER
[facility] MEDICAL SURGICAL UNIT  [address] [place] [address]  Dept: [phone]      ACUTE CARE TRANSFER CONSENT    NAME Thomas Chase                                         1940                              MRN [id number]    I have been informed of my rights regarding examination, treatment, and transfer   by Dr. Shaista Yu, *    Benefits:  In-house trauma service and neurosurgery    Risks:  Worsening of condition, delays in transport , traffic accidents        I authorize the performance of emergency medical procedures and treatments upon me in both transit and upon arrival at the receiving facility.  Additionally, I authorize the release of any and all medical records to the receiving facility and request they be transported with me, if possible.  I understand that the safest mode of transportation during a medical emergency is an ambulance and that the Hospital advocates the use of this mode of transport. Risks of traveling to the receiving facility by car, including absence of medical control, life sustaining equipment, such as oxygen, and medical personnel has been explained to me and I fully understand them.    (ELISEO CORRECT BOX BELOW)  [  ]  I consent to the stated transfer and to be transported by ambulance/helicopter.  [  ]  I consent to the stated transfer, but refuse transportation by ambulance and accept full responsibility for my transportation by car.  I understand the risks of non-ambulance transfers and I exonerate the Hospital and its staff from any deterioration in my condition that results from this refusal.    X___________________________________________    DATE  24  TIME________  Signature of patient or legally responsible individual signing on patient behalf           RELATIONSHIP TO PATIENT_________________________          Provider Certification    NAME Thomas Chase                                        AISLINN 1940                               MRN [id number]    A medical screening exam was performed on the above named patient.  Based on the examination:    Condition Necessitating Transfer left intraparenchymal hemorrhage    Patient Condition:  Fair    Reason for Transfer:  Left intraparenchymal hemorrhage status post fall requiring neurosurgery evaluation    Transfer Requirements: Facility     Space available and qualified personnel available for treatment as acknowledged by    Agreed to accept transfer and to provide appropriate medical treatment as acknowledged by          Appropriate medical records of the examination and treatment of the patient are provided at the time of transfer   STAFF INITIAL WHEN COMPLETED _______  Transfer will be performed by qualified personnel from    and appropriate transfer equipment as required, including the use of necessary and appropriate life support measures.    Provider Certification: I have examined the patient and explained the following risks and benefits of being transferred/refusing transfer to the patient/family:         Based on these reasonable risks and benefits to the patient and/or the unborn child(geno), and based upon the information available at the time of the patient’s examination, I certify that the medical benefits reasonably to be expected from the provision of appropriate medical treatments at another medical facility outweigh the increasing risks, if any, to the individual’s medical condition, and in the case of labor to the unborn child, from effecting the transfer.    X____________________________________________ DATE 02/16/24        TIME_______      ORIGINAL - SEND TO MEDICAL RECORDS   COPY - SEND WITH PATIENT DURING TRANSFER

## 2024-02-16 NOTE — PLAN OF CARE
Problem: MUSCULOSKELETAL - ADULT  Goal: Maintain or return mobility to safest level of function  Description: INTERVENTIONS:  - Assess patient's ability to carry out ADLs; assess patient's baseline for ADL function and identify physical deficits which impact ability to perform ADLs (bathing, care of mouth/teeth, toileting, grooming, dressing, etc.)  - Assess/evaluate cause of self-care deficits   - Assess range of motion  - Assess patient's mobility  - Assess patient's need for assistive devices and provide as appropriate  - Encourage maximum independence but intervene and supervise when necessary  - Involve family in performance of ADLs  - Assess for home care needs following discharge   - Consider OT consult to assist with ADL evaluation and planning for discharge  - Provide patient education as appropriate  Outcome: Progressing  Goal: Maintain proper alignment of affected body part  Description: INTERVENTIONS:  - Support, maintain and protect limb and body alignment  - Provide patient/ family with appropriate education  Outcome: Progressing     Problem: Knowledge Deficit  Goal: Patient/family/caregiver demonstrates understanding of disease process, treatment plan, medications, and discharge instructions  Description: Complete learning assessment and assess knowledge base.  Interventions:  - Provide teaching at level of understanding  - Provide teaching via preferred learning methods  Outcome: Progressing

## 2024-02-16 NOTE — CASE MANAGEMENT
Case Management Assessment & Discharge Planning Note    Patient name Thomas Chase  Location ED 19/ED 19 MRN [id number]  : 1940 Date 2024       Current Admission Date: 2024  Current Admission Diagnosis:SDH (subdural hematoma) (HCC)   Patient Active Problem List    Diagnosis Date Noted    Intracranial hemorrhage (HCC) 2024    Abnormal CT scan, lumbar spine 02/15/2024    Severe protein-calorie malnutrition (HCC) 2024    Debility 2024    Pharyngeal myoclonus 2023    Dysphagia 2023    Macular degeneration, age related 2023    Traumatic subdural hemorrhage with loss of consciousness of unspecified duration, initial encounter ([facility]) 2023    Sensorineural hearing loss (SNHL), bilateral 2022    Balance disorder 2022    Abnormal echocardiogram 2022    Right bundle-branch block 2022    Moderate protein-calorie malnutrition (HCC) 2022    Hypotension 2022    Diastolic dysfunction 2022    EKG abnormalities 2022    Constipation 2022    SDH (subdural hematoma) ([facility]) 2022    Primary osteoarthritis of left knee 2022    Hygroma 2022    Right hand pain     Carpal tunnel syndrome on right     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HCC) 2021    Overweight (BMI 25.0-29.9) 2021    Negative depression screening 2019    Hypercholesterolemia 2018    Borderline hypertension 2018    Hearing loss 2009      LOS (days): 0  Geometric Mean LOS (GMLOS) (days): 2  Days to GMLOS:1.6     OBJECTIVE:    Risk of Unplanned Readmission Score: 12.24         Current admission status: Inpatient       Preferred Pharmacy:   [id number]  [place] [address]  [address] [place] [address]  Phone: [phone] Fax: [phone]    Primary Care Provider: RONY Diaz    Primary Insurance: MEDICARE  Secondary Insurance: [employer]UE  SHIELD    ASSESSMENT:  Active Health Care Proxies    There are no active Health Care Proxies on file.       Advance Directives  Does patient have a Health Care POA?: Yes  Does patient have Advance Directives?: Yes  Advance Directives: Living will, Power of  for health care  Primary Contact: Gina Chase (Spouse) [phone] (H) [phone]    Readmission Root Cause  30 Day Readmission: No    Patient Information  Admitted from:: Home  Mental Status: Alert  During Assessment patient was accompanied by: Spouse  Assessment information provided by:: Spouse  Primary Caregiver: Spouse  Caregiver's Name:: Gina Chase  Caregiver's Relationship to Patient:: Significant Other  Caregiver's Telephone Number:: [phone] (h) [phone]  Support Systems: Spouse/significant other, Self, Family members  County of Residence: [place]  What [facility] do you live in?: [place]  Home entry access options. Select all that apply.: Stairs  Number of steps to enter home.: 1  Do the steps have railings?: No  Type of Current Residence: 2 story home  Upon entering residence, is there a bedroom on the main floor (no further steps)?: No  A bedroom is located on the following floor levels of residence (select all that apply):: 2nd Floor  Upon entering residence, is there a bathroom on the main floor (no further steps)?: Yes  Number of steps to 2nd floor from main floor: One Flight  Living Arrangements: Lives w/ Spouse/significant other  Is patient a ?: No    Activities of Daily Living Prior to Admission  Functional Status: Assistance (driving, cooking, meds,laundry, shopping)  Completes ADLs independently?: No  Level of ADL dependence: Assistance  Ambulates independently?: Yes  Does patient use assisted devices?: Yes  Assisted Devices (DME) used: Quad Cane  Does patient currently own DME?: Yes  What DME does the patient currently own?: Bedside Commode, Shower Chair, Quad Cane  Does patient have a history of Outpatient Therapy  (PT/OT)?: Yes  Does the patient have a history of Short-Term Rehab?: Yes ([facility])  Does patient have a history of HHC?: Yes  Does patient currently have HHC?: No    Patient Information Continued  Income Source: Pension/jail  Does patient have prescription coverage?: Yes  Does patient receive dialysis treatments?: No  Does patient have a history of substance abuse?: No  Does patient have a history of Mental Health Diagnosis?: Yes (Depression)  Is patient receiving treatment for mental health?: Yes  Has patient received inpatient treatment related to mental health in the last 2 years?: No    Means of Transportation  Means of Transport to Appts:: Family transport    Social Determinants of Health (SDOH)      Flowsheet Row Most Recent Value   Housing Stability    In the last 12 months, was there a time when you were not able to pay the mortgage or rent on time? N   In the last 12 months, how many places have you lived? 1   In the last 12 months, was there a time when you did not have a steady place to sleep or slept in a shelter (including now)? N   Transportation Needs    In the past 12 months, has lack of transportation kept you from medical appointments or from getting medications? no   In the past 12 months, has lack of transportation kept you from meetings, work, or from getting things needed for daily living? No   Food Insecurity    Within the past 12 months, you worried that your food would run out before you got the money to buy more. Never true   Within the past 12 months, the food you bought just didn't last and you didn't have money to get more. Never true   Utilities    In the past 12 months has the electric, gas, oil, or water company threatened to shut off services in your home? No          DISCHARGE DETAILS:    Discharge planning discussed with:: Gina Chase (Spouse) [phone] (H) [phone]  Freedom of Choice: Yes     CM contacted family/caregiver?: Yes  Were Treatment Team discharge  recommendations reviewed with patient/caregiver?: Yes  Did patient/caregiver verbalize understanding of patient care needs?: N/A- going to facility  Were patient/caregiver advised of the risks associated with not following Treatment Team discharge recommendations?: Yes    Contacts  Patient Contacts: Allegra Chase  Relationship to Patient:: Family  Contact Method: Phone  Phone Number: [phone]  Reason/Outcome: Discharge Planning, Emergency Contact, Continuity of Care    Requested Home Health Care         Is the patient interested in [facility] at discharge?: No    DME Referral Provided  Referral made for DME?: No    Other Referral/Resources/Interventions Provided:  Interventions: Short Term Rehab    Treatment Team Recommendation: Short Term Rehab  Discharge Destination Plan:: Short Term Rehab      Pt was transferred from [facility].  Pt lives with his spouse in a 2 story home which has 1STE and a full flight inside. Pt's bedroom is on 1st floor with a 1/2 bath (standard toilet).   Pt doesn't drive. Pt is independent for ADL but needs assistance with iADLs. Pt is retired. Pt's had 1-2 falls.  Pt uses a quad cane, but also owns a shower chair and SPC.  Pt was recommended for IP rehab while at [facility]  Will appreciate therapy recommendations at [facility] and will follow up    CM reviewed d/c planning process including the following: identifying help at home, patient preference for d/c planning needs, Discharge Lounge, [facility] Meds to Bed program, availability of treatment team to discuss questions or concerns patient and/or family may have regarding understanding medications and recognizing signs and symptoms once discharged.  CM also encouraged patient to follow up with all recommended appointments after discharge. Patient advised of importance for patient and family to participate in managing patient’s medical well being.

## 2024-02-16 NOTE — RAPID RESPONSE
Rapid Response Note  Thomas Chase 83 y.o. male MRN: [id number]  Unit/Bed#: -01 Encounter: [id number]    Rapid Response Notification(s):   Response called date/time:  2/15/2024 11:52 PM  Response team arrival date/time:  2/15/2024 11:54 PM  Response end date/time:  2/16/2024 12:10 AM  Level of care:  [facility]  Rapid response location:  [facility] unit  Primary reason for rapid response call:  Fall    Rapid Response Intervention(s):   Airway:  None  Breathing:  None  Circulation:  None  Fluids administered:  None  Medications administered:  None       Assessment:   Pt was found on the floor outside of the bathroom lying on his R side after an unwitnessed fall.  Unable to determine what lead to the fall as pt very [place] and unable to understand  Pt was assisted to his back by staff with proper c-spine support and c-collar was applied.  Pt was assisted to backboard and transferred safely to the bed by staff  Pt did not appear to have any pain upon palpation of spine and neck  Noted to have a small bump on R side of head and a small abrasion on the R posterior aspect of the shoulder.    Plan:   Pt send for STAT CT of Head/Neck/C-spine  F/u with imaging results     Rapid Response Outcome:   Transfer:  Remain on floor  Primary service notified of transfer: Yes    Code Status: Level 3 (DNAR and DNI)      Family member contacted: JOSE A GONZALES notified pt's daughter     Background/Situation:   Thomas Chase is a 83 y.o. male who was an unwitnessed fall in his room. He was admitted for debility on 2/13 after he was having difficulty walking while at home. Pt was noted to have an unchanged 3 mm thin subdural density in the left frontal region. Pt was found lying down on his right side. He was assisted safely to his back while maintaining c-spine precautions and c-collar was applied. Pt was then safely transferred to back board and then to the bed. Pt to go for stat CT head/neck/c-spine.    Review of Systems   Unable to perform  "ROS: Other (cognitive disability)       Objective:   Vitals:    02/15/24 2357 02/15/24 2359 02/15/24 2359 02/16/24 0003   BP:   169/74    Pulse: 97 (!) 106 92 87   Resp:   18    Temp:       TempSrc:       SpO2: 95% 96% 96% 94%   Weight:       Height:         Physical Exam  Vitals and nursing note reviewed.   Constitutional:       General: He is not in acute distress.     Appearance: He is ill-appearing.   HENT:      Head:        Mouth/Throat:      Mouth: Mucous membranes are moist.   Eyes:      Pupils: Pupils are equal, round, and reactive to light.   Neck:      Comments: Cervical collar in place  Musculoskeletal:         General: No tenderness or deformity.   Skin:     General: Skin is warm and dry.      Coloration: Skin is pale.                Portions of the record may have been created with voice recognition software.  Occasional wrong word or \"sound a like\" substitutions may have occurred due to the inherent limitations of voice recognition software.  Read the chart carefully and recognize, using context, where substitutions have occurred.    RONY Woodson      "

## 2024-02-16 NOTE — CONSULTS
NEUROLOGY RESIDENCY CONSULT NOTE     Name: Thomas Chase   Age & Sex: 83 y.o. male   MRN: [id number]  Unit/Bed#: ED 19   Encounter: [id number]  Length of Stay: 0    ASSESSMENT & PLAN     *BLE weakness   Assessment & Plan  orlando Chase is a 83 y.o. male w/ complex PMH including deafness s/p recent cochlear implant (1/17/23), SDH s/p craniotomy (5/28/22) who p/w sudden-onset BLE unclear etiology.  Did have a subsequent fall with new SDH in the territory of his chronic SDH and possible SAH components, as well as new left temporal hypodensity concerning for an evolving infarct versus other underlying lesion with edema appearing to be more vasogenic and cytotoxic at this time.  His sudden weakness may be a part of his current subacute to chronic picture of failure to thrive but cannot rule out a more acute process without further workup.  He does have intact reflexes and given the sudden onset of these symptoms and his preserved BLE movement, lower suspicion for an ascending neuromuscular condition. CT lumbar spine clear of acute injury.  Wife notes that patient is unable to have an MRI due to the cochlear implant, so he should at least have a repeat CTh and CTA.  Given the reported subacute onset of dysphagia and difficulty with maintaining appetite, could consider movement disorder, particularly given that patient appears to be intermittently with increased tone.  Could have also been a for understanding of the exam instructions given his deafness    Plan:  Hold AP/AC in the setting of the new bleeds until cleared by neurosurgery  BP goals: -160 in the setting of new bleeds and possible stroke  Repeat CTH and obtain CTA to r/o cerebrovascular pathology, particularly given new bleed (would prefer MRI but at this time appears that he cannot have this in the setting of his cochlear implant, and this would likely cause significant artifact as well)  Continue monitoring on telemetry, frequent neuro checks,  stat CTH for acute change; Replete lytes, goal euglycemia (gluc < 180) and normothermia  PT/OT/PMR/ST w/ swallow eval  Consider dietary evaluation for possible malnutrition in the setting of poor p.o. intake over the last several months  Case and plan d/w attending. See attestation additional/updated recommendations      Recommendations for outpatient neurological follow up have yet to be determined.      Pending for d/c: Workup as above, clinical improvement, PT/OT evaluation    SUBJECTIVE     Reason for Consult / Principal Problem: BLE weakness, SDH  Hx and PE limited by: minimally verbal      HPI: Thomas Chase is a 83 y.o. male w/ complex PMH including deafness s/p recent cochlear implant (1/17/23), SDH s/p craniotomy (5/28/22) who p/w sudden-onset BLE starting PTA. Patient was able to ambulate independently to bathroom and sit down, but was a unable to stand up thereafter and had to be helped up by his wife and his son.  Patient does live independently with his wife.  Patient is deaf at baseline and minimally verbal.  He did have a recent cochlear implant after which wife believes he began to have worsening swallowing difficulties.  Note that he has been having some trouble with gait dysfunction for some time.  He has also been having some trouble with swallowing and at this point some weight loss likely in the context of his swallowing difficulties but also had the context of poor appetite.  Unclear etiology for the progression of the symptoms.  Per wife he is unable to have an MRI at this time because of the cochlear implant.  He does not appear to have had significant benefit from a hearing perspective with the cochlear implant.  Currents SDH appears to be in the same region as his chronic SDH though it had previously improved.  Patient has had a lumbar CT which did not show acute fracture or bony injury.      Inpatient consult to Neurology  Consult performed by: Bharti Pisano MD  Consult ordered by: Shannen GONZALEZ  HARIKA Dominguez        Historical Information   Past Medical History:   Diagnosis Date    Arthritis     Encounter for general adult medical examination without abnormal findings 03/20/2019    Fall 11/03/2022    Hyperlipidemia     Macular degeneration, wet (HCC)     Urinary retention 06/02/2022     Past Surgical History:   Procedure Laterality Date    BRAIN HEMATOMA EVACUATION Left 05/28/2022    Procedure: left CRANIOTOMY FOR SUBDURAL HEMATOMA;  Surgeon: Chris Watts MD;  Location:  MAIN OR;  Service: Neurosurgery    CARPAL TUNNEL RELEASE      HERNIA REPAIR Right     inguinal    IR CEREBRAL ANGIOGRAPHY / INTERVENTION  06/02/2022    SD COCHLEAR DEVICE IMPLANTATION W/WO MASTOIDECTOMY Left 1/17/2023    Procedure: LEFT COCHLEAR IMPLANTATION WITH MASTOIDECTOMY AND FACIAL NERVE MONITORING WITH AUDIOMETRIC TESTING OF THE IMPLANT;  Surgeon: Susie Tuttle MD;  Location:  MAIN OR;  Service: ENT    SD NEUROPLASTY &/TRANSPOS MEDIAN NRV CARPAL TUNNE Right 09/20/2021    Procedure: RELEASE CARPAL TUNNEL;  Surgeon: Bruno Segovia MD;  Location: [facility];  Service: Orthopedics     Social History   Social History     Substance and Sexual Activity   Alcohol Use Yes    Alcohol/week: 3.0 standard drinks of alcohol    Types: 3 Cans of beer per week     Social History     Substance and Sexual Activity   Drug Use No     E-Cigarette/Vaping    E-Cigarette Use Never User      E-Cigarette/Vaping Substances     Social History     Tobacco Use   Smoking Status Former   Smokeless Tobacco Never   Tobacco Comments    Smoked as a teenager     Family History: History reviewed. No pertinent family history.  Meds/Allergies   all current active meds have been reviewed and PTA meds:   Prior to Admission Medications   Prescriptions Last Dose Informant Patient Reported? Taking?   Multiple Vitamin (MULTIVITAMIN) tablet  Spouse/Significant Other Yes No   Sig: Take by mouth daily    Multiple Vitamins-Minerals (PRESERVISION AREDS 2 PO)   Spouse/Significant Other Yes No   Sig: Take by mouth   acetaminophen (TYLENOL) 325 mg tablet  Spouse/Significant Other No No   Sig: Take 2 tablets (650 mg total) by mouth every 6 (six) hours as needed for mild pain   loratadine (CLARITIN) 10 mg tablet  Spouse/Significant Other No No   Sig: Take 1 tablet (10 mg total) by mouth daily   Patient not taking: Reported on 2024   simvastatin (ZOCOR) 20 mg tablet  Spouse/Significant Other No No   Sig: Take 1 tablet (20 mg total) by mouth daily at bedtime      Facility-Administered Medications: None     No Known Allergies    Previous medical records under review    Review of Systems 13 point ROS negative unless otherwise specified    OBJECTIVE     Patient ID: Thomas Chase is a 83 y.o. male.    Vitals:   Vitals:    24 1200 24 1315 24 1430 24 1500   BP: 124/69 116/63 104/58 118/61   BP Location: Left arm Left arm Left arm Left arm   Pulse: 68 82 68 66   Resp: 16 18 18 18   Temp:       TempSrc:       SpO2: 97% 98% 96% 96%      There is no height or weight on file to calculate BMI.   No intake or output data in the 24 hours ending 24 1516    Temperature: Temp (24hrs), Av.2 °F (36.8 °C), Min:98.1 °F (36.7 °C), Max:98.3 °F (36.8 °C)   Temperature: 98.1 °F (36.7 °C)    Invasive Devices:   Invasive Devices       Peripheral Intravenous Line  Duration             Peripheral IV 24 Right Hand 3 days                  Physical Exam Vitals reviewed.   Constitutional:    Not in acute distress.  Thin and frail but not toxic-appearing or diaphoretic.   HENT:   Normocephalic.  External ear normal b/l. Nose normal. No congestion or rhinorrhea.  Eyes:    No scleral icterus.  No discharge b/l.  Conjunctivae normal.   Pulmonary:  Pulmonary effort is normal. No respiratory distress.   GI: abdomen not distended  Musculoskeletal: no gross deformities  Skin:   Skin is not pale.   Psychiatric:       Mood normal. Affect congruent    Neurologic Exam:    Mental Status patient sleeping but arouses to tactile stimulation.  Answers questions appropriately when written down but he is deaf and cannot follow auditory/verbal commands.  He was able to speak some words very dysarthrically  Cranial Nerves EOMI, no obvious nystagmus. Facial expression full, symmetric.  Hearing absent bilaterally.  Appears to be midline but patient would not open mouth to command either written or mouthed  Motor Exam Muscle bulk diminished and tone intermittently increased but unclear if patient following commands to relax/resisting; moves BUE/BLE approximately symmetrically and purposefully with good resistance against exam but does not follow commands reliably.  Is antigravity with all limbs.  Able to sit up appropriately, rollover appropriately to help with self-care  Sensory Exam response to tactile stimulus symmetrically BUE/BLE  Gait, Coordination, and Reflexes reflexes overall intact but diminished, at some point the patient was tensing, but does clearly have at least trace to 1+ Achilles bilaterally, 1+ to 2+ patellar's bilaterally.  Plantar response mute on the left, downgoing on the right b/l.     LABORATORY DATA     Labs: I have personally reviewed pertinent reports.      Results from last 7 days   Lab Units 02/16/24  0329 02/15/24  0424 02/14/24  0430 02/13/24  1443   WBC Thousand/uL 10.40* 6.18 6.28 5.49   HEMOGLOBIN g/dL 12.6 13.7 13.3 12.9   HEMATOCRIT % 37.3 41.2 42.2 39.3   PLATELETS Thousands/uL 433* 440* 429* 405*   NEUTROS PCT % 82*  --   --  77*   MONOS PCT % 8  --   --  11   EOS PCT % 0  --   --  0      Results from last 7 days   Lab Units 02/16/24  0329 02/15/24  0424 02/14/24  0430   POTASSIUM mmol/L 4.0 4.3 4.1   CHLORIDE mmol/L 100 103 104   CO2 mmol/L 26 30 27   BUN mg/dL 14 17 15   CREATININE mg/dL 0.53* 0.58* 0.56*   CALCIUM mg/dL 8.5 8.6 9.2   ALK PHOS U/L 79  --  87   ALT U/L 33  --  23   AST U/L 36  --  42*     Results from last 7 days   Lab Units  02/14/24  0430   MAGNESIUM mg/dL 2.2     Results from last 7 days   Lab Units 02/14/24  0430   PHOSPHORUS mg/dL 3.1          Lab Results   Component Value Date    LDLCALC 112 (H) 10/24/2023     IMAGING & DIAGNOSTIC TESTING     Radiology Results: I have personally reviewed pertinent reports.   and I have personally reviewed pertinent films in PACS  CT head wo contrast   Final Result by Callum Weiss MD (02/16 1985)      1.  Unfortunately significant artifact from cochlear implant degrades image quality in the left temporal lobe where there is possible area of hypodensity concerning for acute/subacute infarct.   2.  New small volume acute subarachnoid hemorrhage in the paramedian right frontal vertex as well as trace new subarachnoid hemorrhage within the deep right sylvian fissure.   3.  Significantly improved recent subarachnoid hemorrhage in the inferior left frontal lobe. Minimal new subarachnoid hemorrhage versus redistribution in the inferior left parietal lobe.   4.  Stable 4 mm thick primarily hypoattenuating subdural collection/hematoma over the left frontoparietal lobes.                              I personally discussed this study with GERSON MAGANA on 2/16/2024 1:45 PM.                  Workstation performed: [id number]         CTA head and neck w wo contrast    (Results Pending)       Other Diagnostic Testing: I have personally reviewed pertinent reports.      ACTIVE MEDICATIONS     Current Facility-Administered Medications   Medication Dose Route Frequency    acetaminophen (TYLENOL) tablet 975 mg  975 mg Oral Q8H    docusate sodium (COLACE) capsule 100 mg  100 mg Oral BID    levETIRAcetam (KEPPRA) injection 500 mg  500 mg Intravenous Q12H JEANNIE    loratadine (CLARITIN) tablet 10 mg  10 mg Oral Daily    melatonin tablet 6 mg  6 mg Oral HS    multivitamin-minerals (CENTRUM) tablet 1 tablet  1 tablet Oral Daily    ondansetron (ZOFRAN) injection 4 mg  4 mg Intravenous Q6H PRN    pravastatin (PRAVACHOL)  tablet 40 mg  40 mg Oral Daily With Dinner    senna (SENOKOT) tablet 8.6 mg  1 tablet Oral HS    sertraline (ZOLOFT) tablet 25 mg  25 mg Oral Daily       CODE STATUS & ADVANCED DIRECTIVES     Code Status: Level 3 - DNAR and DNI  Advance Directive and Living Will:      Power of :    POLST:      VTE Pharmacologic Prophylaxis:  Held in setting of acute bleeds, pending neurosurgery/trauma recs  VTE Mechanical Prophylaxis: sequential compression device        ==========  Bharti Pisano MD  Resident, Neurology, PGY-4  [facility]

## 2024-02-17 ENCOUNTER — APPOINTMENT (INPATIENT)
Dept: NON INVASIVE DIAGNOSTICS | Facility: HOSPITAL | Age: 84
DRG: 085 | End: 2024-02-17
Payer: MEDICARE

## 2024-02-17 ENCOUNTER — APPOINTMENT (INPATIENT)
Dept: RADIOLOGY | Facility: HOSPITAL | Age: 84
DRG: 085 | End: 2024-02-17
Payer: MEDICARE

## 2024-02-17 PROBLEM — R29.898 LOWER EXTREMITY WEAKNESS: Status: ACTIVE | Noted: 2024-02-17

## 2024-02-17 LAB
ANION GAP SERPL CALCULATED.3IONS-SCNC: 7 MMOL/L
AORTIC ROOT: 3.5 CM
APICAL FOUR CHAMBER EJECTION FRACTION: 53 %
ASCENDING AORTA: 3.4 CM
AV LVOT MEAN GRADIENT: 2 MMHG
AV LVOT PEAK GRADIENT: 5 MMHG
BACTERIA UR QL AUTO: ABNORMAL /HPF
BILIRUB UR QL STRIP: NEGATIVE
BSA FOR ECHO PROCEDURE: 1.75 M2
BUN SERPL-MCNC: 15 MG/DL (ref 5–25)
CALCIUM SERPL-MCNC: 8.9 MG/DL (ref 8.4–10.2)
CHLORIDE SERPL-SCNC: 100 MMOL/L (ref 96–108)
CHOLEST SERPL-MCNC: 171 MG/DL
CLARITY UR: ABNORMAL
CO2 SERPL-SCNC: 29 MMOL/L (ref 21–32)
COLOR UR: YELLOW
CREAT SERPL-MCNC: 0.55 MG/DL (ref 0.6–1.3)
DOP CALC LVOT PEAK VEL VTI: 20.65 CM
DOP CALC LVOT PEAK VEL: 1.15 M/S
E WAVE DECELERATION TIME: 234 MS
E/A RATIO: 1.1
ERYTHROCYTE [DISTWIDTH] IN BLOOD BY AUTOMATED COUNT: 13.7 % (ref 11.6–15.1)
EST. AVERAGE GLUCOSE BLD GHB EST-MCNC: 126 MG/DL
FRACTIONAL SHORTENING: 32 (ref 28–44)
GFR SERPL CREATININE-BSD FRML MDRD: 95 ML/MIN/1.73SQ M
GLUCOSE SERPL-MCNC: 112 MG/DL (ref 65–140)
GLUCOSE UR STRIP-MCNC: NEGATIVE MG/DL
HBA1C MFR BLD: 6 %
HCT VFR BLD AUTO: 40.8 % (ref 36.5–49.3)
HDLC SERPL-MCNC: 66 MG/DL
HGB BLD-MCNC: 13.1 G/DL (ref 12–17)
HGB UR QL STRIP.AUTO: ABNORMAL
INTERVENTRICULAR SEPTUM IN DIASTOLE (PARASTERNAL SHORT AXIS VIEW): 1 CM
INTERVENTRICULAR SEPTUM: 1 CM (ref 0.6–1.1)
KETONES UR STRIP-MCNC: NEGATIVE MG/DL
LAAS-AP2: 10 CM2
LAAS-AP4: 6.7 CM2
LDLC SERPL CALC-MCNC: 90 MG/DL (ref 0–100)
LEFT ATRIUM SIZE: 2.9 CM
LEFT ATRIUM VOLUME (MOD BIPLANE): 14 ML
LEFT ATRIUM VOLUME INDEX (MOD BIPLANE): 8 ML/M2
LEFT INTERNAL DIMENSION IN SYSTOLE: 2.5 CM (ref 2.1–4)
LEFT VENTRICULAR INTERNAL DIMENSION IN DIASTOLE: 3.7 CM (ref 3.5–6)
LEFT VENTRICULAR POSTERIOR WALL IN END DIASTOLE: 1.2 CM
LEFT VENTRICULAR STROKE VOLUME: 35 ML
LEUKOCYTE ESTERASE UR QL STRIP: ABNORMAL
LVSV (TEICH): 35 ML
MCH RBC QN AUTO: 30.8 PG (ref 26.8–34.3)
MCHC RBC AUTO-ENTMCNC: 32.1 G/DL (ref 31.4–37.4)
MCV RBC AUTO: 96 FL (ref 82–98)
MV E'TISSUE VEL-SEP: 13 CM/S
MV PEAK A VEL: 0.62 M/S
MV PEAK E VEL: 68 CM/S
MV STENOSIS PRESSURE HALF TIME: 68 MS
MV VALVE AREA P 1/2 METHOD: 3.2
NITRITE UR QL STRIP: POSITIVE
NON-SQ EPI CELLS URNS QL MICRO: ABNORMAL /HPF
NONHDLC SERPL-MCNC: 105 MG/DL
PH UR STRIP.AUTO: 8.5 [PH]
PLATELET # BLD AUTO: 460 THOUSANDS/UL (ref 149–390)
PMV BLD AUTO: 9.4 FL (ref 8.9–12.7)
POTASSIUM SERPL-SCNC: 4.4 MMOL/L (ref 3.5–5.3)
PROT UR STRIP-MCNC: ABNORMAL MG/DL
RBC # BLD AUTO: 4.25 MILLION/UL (ref 3.88–5.62)
RBC #/AREA URNS AUTO: ABNORMAL /HPF
RIGHT ATRIUM AREA SYSTOLE A4C: 12.5 CM2
RIGHT VENTRICLE ID DIMENSION: 4 CM
SL CV LEFT ATRIUM LENGTH A2C: 4.7 CM
SL CV LV EF: 60
SL CV PED ECHO LEFT VENTRICLE DIASTOLIC VOLUME (MOD BIPLANE) 2D: 58 ML
SL CV PED ECHO LEFT VENTRICLE SYSTOLIC VOLUME (MOD BIPLANE) 2D: 23 ML
SODIUM SERPL-SCNC: 136 MMOL/L (ref 135–147)
SP GR UR STRIP.AUTO: >1.05 (ref 1–1.03)
TR MAX PG: 23 MMHG
TR PEAK VELOCITY: 2.4 M/S
TRI-PHOS CRY URNS QL MICRO: ABNORMAL /HPF
TRICUSPID ANNULAR PLANE SYSTOLIC EXCURSION: 2.1 CM
TRICUSPID VALVE PEAK REGURGITATION VELOCITY: 2.42 M/S
TRIGL SERPL-MCNC: 77 MG/DL
UROBILINOGEN UR STRIP-ACNC: 2 MG/DL
WBC # BLD AUTO: 9.95 THOUSAND/UL (ref 4.31–10.16)
WBC #/AREA URNS AUTO: ABNORMAL /HPF

## 2024-02-17 PROCEDURE — 87040 BLOOD CULTURE FOR BACTERIA: CPT | Performed by: SURGERY

## 2024-02-17 PROCEDURE — 97167 OT EVAL HIGH COMPLEX 60 MIN: CPT

## 2024-02-17 PROCEDURE — 85027 COMPLETE CBC AUTOMATED: CPT | Performed by: PHYSICIAN ASSISTANT

## 2024-02-17 PROCEDURE — 81001 URINALYSIS AUTO W/SCOPE: CPT | Performed by: SURGERY

## 2024-02-17 PROCEDURE — 97530 THERAPEUTIC ACTIVITIES: CPT

## 2024-02-17 PROCEDURE — G1004 CDSM NDSC: HCPCS

## 2024-02-17 PROCEDURE — 93306 TTE W/DOPPLER COMPLETE: CPT

## 2024-02-17 PROCEDURE — 99232 SBSQ HOSP IP/OBS MODERATE 35: CPT | Performed by: STUDENT IN AN ORGANIZED HEALTH CARE EDUCATION/TRAINING PROGRAM

## 2024-02-17 PROCEDURE — 80061 LIPID PANEL: CPT | Performed by: SURGERY

## 2024-02-17 PROCEDURE — 83036 HEMOGLOBIN GLYCOSYLATED A1C: CPT | Performed by: SURGERY

## 2024-02-17 PROCEDURE — 70498 CT ANGIOGRAPHY NECK: CPT

## 2024-02-17 PROCEDURE — 93306 TTE W/DOPPLER COMPLETE: CPT | Performed by: INTERNAL MEDICINE

## 2024-02-17 PROCEDURE — 97535 SELF CARE MNGMENT TRAINING: CPT

## 2024-02-17 PROCEDURE — 99232 SBSQ HOSP IP/OBS MODERATE 35: CPT | Performed by: SURGERY

## 2024-02-17 PROCEDURE — 80048 BASIC METABOLIC PNL TOTAL CA: CPT | Performed by: PHYSICIAN ASSISTANT

## 2024-02-17 PROCEDURE — 97163 PT EVAL HIGH COMPLEX 45 MIN: CPT

## 2024-02-17 PROCEDURE — 70496 CT ANGIOGRAPHY HEAD: CPT

## 2024-02-17 RX ADMIN — LORATADINE 10 MG: 10 TABLET ORAL at 08:30

## 2024-02-17 RX ADMIN — ACETAMINOPHEN 975 MG: 325 TABLET, FILM COATED ORAL at 23:35

## 2024-02-17 RX ADMIN — SENNOSIDES 8.6 MG: 8.6 TABLET, FILM COATED ORAL at 21:11

## 2024-02-17 RX ADMIN — IOHEXOL 85 ML: 350 INJECTION, SOLUTION INTRAVENOUS at 04:42

## 2024-02-17 RX ADMIN — Medication 1 TABLET: at 08:30

## 2024-02-17 RX ADMIN — DOCUSATE SODIUM 100 MG: 100 CAPSULE, LIQUID FILLED ORAL at 18:35

## 2024-02-17 RX ADMIN — PRAVASTATIN SODIUM 40 MG: 40 TABLET ORAL at 16:07

## 2024-02-17 RX ADMIN — LEVETIRACETAM 500 MG: 100 INJECTION, SOLUTION INTRAVENOUS at 21:11

## 2024-02-17 RX ADMIN — ACETAMINOPHEN 975 MG: 325 TABLET, FILM COATED ORAL at 16:07

## 2024-02-17 RX ADMIN — DOCUSATE SODIUM 100 MG: 100 CAPSULE, LIQUID FILLED ORAL at 08:30

## 2024-02-17 RX ADMIN — SERTRALINE HYDROCHLORIDE 25 MG: 25 TABLET ORAL at 08:30

## 2024-02-17 RX ADMIN — LEVETIRACETAM 500 MG: 100 INJECTION, SOLUTION INTRAVENOUS at 08:30

## 2024-02-17 RX ADMIN — Medication 6 MG: at 21:11

## 2024-02-17 RX ADMIN — ACETAMINOPHEN 975 MG: 325 TABLET, FILM COATED ORAL at 06:30

## 2024-02-17 NOTE — PROGRESS NOTES
[facility]  Progress Note  Name: Thomas Chase I  MRN: [id number]  Unit/Bed#: [facility] 615-01 I Date of Admission: 2/16/2024   Date of Service: 2/17/2024 I Hospital Day: 1    Assessment/Plan   Intracranial hemorrhage (HCC)  Assessment & Plan  CTA 12/17- Enlarging hypodensity within the left anterior lateral temporal lobe. There are 2 punctate hyperdensities noted within the central aspect of this hypodensity which appears to be sparing the cortex. This is less likely to represent ischemia given the lack of cortical involvement and differentials would include primarily nonhemorrhagic contusion or venous infarction  -Discussed new findings with neurology, neurosurgery. Neurology recommends lipid panel, A1c, TTE, infectious workup, and CTH tomorrow  - Continue neurologic checks: Every 1 hours.  - Reversal agent administered: Patient does not take anti-platelet or anti-coagulant medications. No reversal agent indicated.   - CT scan of the head on 2/16 reviewed: Small volume left frontal subarachnoid hemorrhage suspected no midline shift   - Appreciate Neurosurgery evaluation and recommendations.  - Complete 7 day course of Keppra for seizure prophylaxis  - Chemical DVT prophylaxis: Will restart when cleared by Neurosurgery  - Hold all anticoagulants and anti platelet medications for 2 weeks and/or until cleared by Neurosurgery to resume.  - PT and OT (including cognitive evaluation) evaluation and treatment as indicated.      Debility  Assessment & Plan  - PT/OT  - Geriatric consult    SDH (subdural hematoma) (HCC)  Assessment & Plan  - Patient with history of subdural hematoma s/p craniotomy and SD drain placement x 2 in May of 2022  - Persistent thin subdural density vs dural thickening which is unchanged on subsequent CT scans  - Neurosurgery consult and recommendations appreciated     Hypercholesterolemia  Assessment & Plan  - Continue home simvastatin             Bowel Regimen:  senokot, colace  VTE Prophylaxis:Reason for no pharmacologic prophylaxis ICH      Disposition: pending    Subjective   Chief Complaint: N/A    Subjective: Patient alert, but not following commands or answering questions.     Objective   Vitals:   Temp:  [98 °F (36.7 °C)-99 °F (37.2 °C)] 98 °F (36.7 °C)  HR:  [66-83] 83  Resp:  [16-18] 17  BP: ()/(52-82) 102/63    I/O         02/15 0701  02/16 0700 02/16 0701  02/17 0700 02/17 0701  02/18 0700    P.O.  15 120    Total Intake  15 120    Net  +15 +120           Unmeasured Urine Occurrence  2 x 1 x             Physical Exam:   General: NAD  HENT: NCAT MMM  Neck: supple, no JVD  CV: nl rate  Lungs: nl wob. No resp distress  ABD: Soft, nontender, nondistended  Extrem: No CCE  Neuro: Alert. GCS 12, does not answer questions, moans      Invasive Devices       Peripheral Intravenous Line  Duration             Peripheral IV 02/13/24 Right Hand 3 days    Peripheral IV 02/17/24 Right;Upper;Ventral (anterior) Arm <1 day              Drain  Duration             External Urinary Catheter Medium <1 day                          Lab Results: Results: I have personally reviewed all pertinent laboratory/tests results, BMP/CMP:   Lab Results   Component Value Date    SODIUM 136 02/17/2024    K 4.4 02/17/2024     02/17/2024    CO2 29 02/17/2024    BUN 15 02/17/2024    CREATININE 0.55 (L) 02/17/2024    CALCIUM 8.9 02/17/2024    EGFR 95 02/17/2024   , and CBC:   Lab Results   Component Value Date    WBC 9.95 02/17/2024    HGB 13.1 02/17/2024    HCT 40.8 02/17/2024    MCV 96 02/17/2024     (H) 02/17/2024    RBC 4.25 02/17/2024    MCH 30.8 02/17/2024    MCHC 32.1 02/17/2024    RDW 13.7 02/17/2024    MPV 9.4 02/17/2024     Imaging: I have personally reviewed pertinent films in PACS   Other Studies:

## 2024-02-17 NOTE — PHYSICAL THERAPY NOTE
Physical Therapy Evaluation     Patient's Name: Thomas Chase    Admitting Diagnosis  Subarachnoid hemorrhage (HCC) [I60.9]  Debility [R53.81]  Intracranial hemorrhage (HCC) [I62.9]    Problem List  Patient Active Problem List   Diagnosis    Hypercholesterolemia    Borderline hypertension    Negative depression screening    Overweight (BMI 25.0-29.9)    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HCC)    Right hand pain    Carpal tunnel syndrome on right    Hygroma    Primary osteoarthritis of left knee    SDH (subdural hematoma) (HCC)    Hearing loss    Constipation    Moderate protein-calorie malnutrition (HCC)    Hypotension    Diastolic dysfunction    EKG abnormalities    Abnormal echocardiogram    Right bundle-branch block    Balance disorder    Sensorineural hearing loss (SNHL), bilateral    Macular degeneration, age related    Traumatic subdural hemorrhage with loss of consciousness of unspecified duration, initial encounter (HCC)    Pharyngeal myoclonus    Dysphagia    Debility    Severe protein-calorie malnutrition (HCC)    Abnormal CT scan, lumbar spine    Intracranial hemorrhage (HCC)    Bilateral lower extremity weakness       Past Medical History  Past Medical History:   Diagnosis Date    Arthritis     Encounter for general adult medical examination without abnormal findings 03/20/2019    Fall 11/03/2022    Hyperlipidemia     Macular degeneration, wet (HCC)     Urinary retention 06/02/2022       Past Surgical History  Past Surgical History:   Procedure Laterality Date    BRAIN HEMATOMA EVACUATION Left 05/28/2022    Procedure: left CRANIOTOMY FOR SUBDURAL HEMATOMA;  Surgeon: Chris Watts MD;  Location: [facility];  Service: Neurosurgery    CARPAL TUNNEL RELEASE      HERNIA REPAIR Right     inguinal    IR CEREBRAL ANGIOGRAPHY / INTERVENTION  06/02/2022    [place] COCHLEAR DEVICE IMPLANTATION W/WO MASTOIDECTOMY Left 1/17/2023    Procedure: LEFT COCHLEAR IMPLANTATION WITH MASTOIDECTOMY AND FACIAL NERVE MONITORING  WITH AUDIOMETRIC TESTING OF THE IMPLANT;  Surgeon: Susie Tuttle MD;  Location: [facility];  Service: ENT    CO NEUROPLASTY &/TRANSPOS MEDIAN NRV CARPAL TUNNE Right 09/20/2021    Procedure: RELEASE CARPAL TUNNEL;  Surgeon: Bruno Segovia MD;  Location: [facility];  Service: Orthopedics          02/17/24 0931   PT Last Visit   PT Visit Date 02/17/24   Note Type   Note type Evaluation  (and Tx)   Pain Assessment   Pain Assessment Tool FLACC   Pain Location/Orientation Location: Generalized   Pain Rating: FLACC (Rest) - Face 0   Pain Rating: FLACC (Rest) - Legs 0   Pain Rating: FLACC (Rest) - Activity 0   Pain Rating: FLACC (Rest) - Cry 0   Pain Rating: FLACC (Rest) - Consolability 0   Score: FLACC (Rest) 0   Pain Rating: FLACC (Activity) - Face 0   Pain Rating: FLACC (Activity) - Legs 0   Pain Rating: FLACC (Activity) - Activity 0   Pain Rating: FLACC (Activity) - Cry 0   Pain Rating: FLACC (Activity) - Consolability 0   Score: FLACC (Activity) 0   Restrictions/Precautions   Other Precautions Cognitive;Bed Alarm;Telemetry;Fall Risk;Hard of hearing   Home Living   Type of Home House   Home Layout Two level  (1 HATTIE)   Home Equipment Quad cane;Cane   Prior Function   Level of [place] Independent with functional mobility  (indicates he uses a walker but is limited historian and will need to clarify; prior records indicate use of QC)   Lives With Spouse   Receives Help From Family   Falls in the last 6 months 1 to 4  (hx of falls)   General   Additional Pertinent History cleared for assessment by sonia   Cognition   Overall Cognitive Status Impaired   Arousal/Participation Alert   Attention Difficulty attending to directions   Orientation Level Unable to assess   Memory Decreased recall of biographical information;Decreased recall of precautions;Decreased recall of recent events   Following Commands Follows one step commands inconsistently   Subjective   Subjective Alert; in bed; generally disoriented and signif  [place] --> communication performed mainly via white board and gestures; agreeable to mobilize;   RUE Assessment   RUE Assessment WFL  (AROM)   LUE Assessment   LUE Assessment WFL  (AROM)   RLE Assessment   RLE Assessment WFL  (AROM)   Strength RLE   RLE Overall Strength   (fair/ fair +)   LLE Assessment   LLE Assessment WFL  (AROM)   Strength LLE   LLE Overall Strength   (fair/ fair +)   Bed Mobility   Supine to Sit 3  Moderate assistance   Additional items Assist x 1;HOB elevated;Increased time required;Verbal cues;LE management   Transfers   Sit to Stand 3  Moderate assistance   Additional items Assist x 1;Increased time required;Verbal cues   Stand to Sit 3  Moderate assistance   Additional items Assist x 1;Increased time required;Verbal cues   Additional Comments Upon standing, noted pt was incontinent of urine on the bed pan and then continued to void while transitioning to the chair; additional care activities performed, incl to change linen and socks.   Ambulation/Elevation   Gait pattern Excessively slow;Short stride;Inconsistent savana   Gait Assistance 4  Minimal assist   Additional items Assist x 1;Verbal cues;Tactile cues   Assistive Device Rolling walker   Distance 3 ft total distance for bed to chair transition; further amb was not performed due to ongoing incontinence;   Balance   Static Sitting Fair -   Dynamic Sitting Poor +   Static Standing Poor +   Dynamic Standing Poor +   Ambulatory Poor +   Activity Tolerance   Activity Tolerance Patient limited by fatigue;Treatment limited secondary to medical complications (Comment)   Medical Staff Made Aware Co-eval performed w/ OTR due to complexity of medical status and multiple comorbidities   Nurse Made Aware spoke to MERY Dial   Assessment   Prognosis Fair   Problem List Decreased strength;Decreased endurance;Impaired balance;Decreased mobility;Decreased cognition;Decreased safety awareness   Assessment Pt is 83 y.o. male admitted with hx of fall and Dx  of ICH and debility. Pt 's comorbidities affecting POC include: Arthritis, Fall, Macular degeneration, hearing loss s/p L cochlear implant, h/o L SDH evacuation in 2022 and Urinary retention and personal factors of: advanced age, HATTIE and steps in the house. Pt's clinical presentation is currently  unstable/unpredictable which is evident in ongoing telem monitoring, abn lab values and inability to progress further w/ mobilization at this time due to ongoing incontinence. Pt presents w/ general disorientation, overall weakness, incl decreased LE strength, decreased functional endurance, impaired balance, gait deviations (a few steps at bedside), and fall risk. Will cont to follow pt in PT for progressive mobilization to address above functional deficits and to max level of (I), endurance, and safety. Currently recommend rehab upon D/C. Will cont to follow until then.   Barriers to Discharge Inaccessible home environment   Goals   Patient Goals pt did not express   STG Expiration Date 02/27/24   Short Term Goal #1 7-10 days. Pt will amb 150 ft w/ rw <--> SPC, mod (I) in order to facilitate safe return to premorbid environment and to initiate return to community amb status.Pt will negotiate 1 step w/ appropriate assistive device, (S)x1 in order to assure safe navigation in and out of the premorbid living environment. Pt will achieve (I) level w/ bed mob in order to facilitate safety with OOB and back to bed transitions in own living environment. Pt will perform transfers w/ mod (I) to assure (I) and safety w/ functional mobility/transitions w/ all aspects of mobility/locomotion. Pt will participate in LE therex and balance activities to max progression w/ mobility skills.   PT Treatment Day 0   Plan   Treatment/Interventions Functional transfer training;LE strengthening/ROM;Elevations;Therapeutic exercise;Endurance training;Cognitive reorientation;Bed mobility;Gait training;Spoke to nursing;OT   PT Frequency 3-5x/wk    Discharge Recommendation   Rehab Resource Intensity Level, PT II (Moderate Resource Intensity)   Equipment Recommended Walker   Walker Package Recommended Wheeled walker   AM-PAC Basic Mobility Inpatient   Turning in Flat Bed Without Bedrails 3   Lying on Back to Sitting on Edge of Flat Bed Without Bedrails 2   Moving Bed to Chair 2   Standing Up From Chair Using Arms 2   Walk in Room 3   Climb 3-5 Stairs With Railing 2   Basic Mobility Inpatient Raw Score 14   Basic Mobility Standardized Score 35.55   Highest Level Of Mobility   JH-HLM Goal 4: Move to chair/commode   JH-HLM Achieved 4: Move to chair/commode   Modified [place] Scale   Modified [place] Scale 4   Additional Treatment Session   Start Time 0932   End Time 0940   Treatment Assessment Additional follow up consecutive session performed to facilitate mobility during care activities after additional episode of incontinence during transition to the chair. Sit <--> stand transfers w/ mod (A); standing balance/tolerance training x 1 min; reclined in the chair w/ LE elevated w/ pillows for UE and LE support   Additional Treatment Day 1   End of Consult   Patient Position at End of Consult Bedside chair;Bed/Chair alarm activated;All needs within reach         Burak Shetty, PT

## 2024-02-17 NOTE — NURSING NOTE
This nurse was unable to complete admission database at this time. Patient is nonverbal, unable to answer database questions.

## 2024-02-17 NOTE — PLAN OF CARE
Problem: Potential for Falls  Goal: Patient will remain free of falls  Description: INTERVENTIONS:  - Educate patient/family on patient safety including physical limitations  - Instruct patient to call for assistance with activity   - Consult OT/PT to assist with strengthening/mobility   - Keep Call bell within reach  - Keep bed low and locked with side rails adjusted as appropriate  - Keep care items and personal belongings within reach  - Initiate and maintain comfort rounds  - Make Fall Risk Sign visible to staff  - Offer Toileting every 2 Hours, in advance of need  - Initiate/Maintain bed/chair alarm  - Obtain necessary fall risk management equipment: nonskid socks  - Apply yellow socks and bracelet for high fall risk patients  - Consider moving patient to room near nurses station  Outcome: Progressing     Problem: Prexisting or High Potential for Compromised Skin Integrity  Goal: Skin integrity is maintained or improved  Description: INTERVENTIONS:  - Identify patients at risk for skin breakdown  - Assess and monitor skin integrity  - Assess and monitor nutrition and hydration status  - Monitor labs   - Assess for incontinence   - Turn and reposition patient  - Assist with mobility/ambulation  - Relieve pressure over bony prominences  - Avoid friction and shearing  - Provide appropriate hygiene as needed including keeping skin clean and dry  - Evaluate need for skin moisturizer/barrier cream  - Collaborate with interdisciplinary team   - Patient/family teaching  - Consider wound care consult   2/17/2024 0246 by Chely Gore RN  Outcome: Progressing  2/17/2024 0246 by Chely Gore RN  Outcome: Progressing     Problem: PAIN - ADULT  Goal: Verbalizes/displays adequate comfort level or baseline comfort level  Description: Interventions:  - Encourage patient to monitor pain and request assistance  - Assess pain using appropriate pain scale  - Administer analgesics based on type and severity of pain and  evaluate response  - Implement non-pharmacological measures as appropriate and evaluate response  - Consider cultural and social influences on pain and pain management  - Notify physician/advanced practitioner if interventions unsuccessful or patient reports new pain  Outcome: Progressing     Problem: SAFETY ADULT  Goal: Patient will remain free of falls  Description: INTERVENTIONS:  - Educate patient/family on patient safety including physical limitations  - Instruct patient to call for assistance with activity   - Consult OT/PT to assist with strengthening/mobility   - Keep Call bell within reach  - Keep bed low and locked with side rails adjusted as appropriate  - Keep care items and personal belongings within reach  - Initiate and maintain comfort rounds  - Make Fall Risk Sign visible to staff  - Offer Toileting every 2 Hours, in advance of need  - Initiate/Maintain bed/chair alarm  - Obtain necessary fall risk management equipment: nonskid socks  - Apply yellow socks and bracelet for high fall risk patients  - Consider moving patient to room near nurses station  2/17/2024 0246 by Chely Gore RN  Outcome: Progressing  2/17/2024 0246 by Chely Gore RN  Outcome: Progressing     Problem: DISCHARGE PLANNING  Goal: Discharge to home or other facility with appropriate resources  Description: INTERVENTIONS:  - Identify barriers to discharge w/patient and caregiver  - Arrange for needed discharge resources and transportation as appropriate  - Identify discharge learning needs (meds, wound care, etc.)  - Arrange for interpretive services to assist at discharge as needed  - Refer to Case Management Department for coordinating discharge planning if the patient needs post-hospital services based on physician/advanced practitioner order or complex needs related to functional status, cognitive ability, or social support system  Outcome: Progressing     Problem: Knowledge Deficit  Goal: Patient/family/caregiver  demonstrates understanding of disease process, treatment plan, medications, and discharge instructions  Description: Complete learning assessment and assess knowledge base.  Interventions:  - Provide teaching at level of understanding  - Provide teaching via preferred learning methods  Outcome: Progressing

## 2024-02-17 NOTE — ASSESSMENT & PLAN NOTE
3mm left SDH, L frontal and right high frontal SAH, s/p fall  H/o L craniotomy for SDH evacuation 5/2022 by DKO, L MMA embolization by MO  No AC/AP medications  Currently at baseline mental status, no focal deficits  Imaging with enlarging hypodensity within the left anterior lateral temporal lobe, unclear etiology at this time.    Imaging:  CT head w/o, 2/16/24: Small volume suspected acute subarachnoid hemorrhage within the left frontal/opercular region. No acute parenchymal hemorrhage or focal mass effect/midline shift. Stable 3 mm subdural collection along the inferior left frontal convexity.  No skull fracture. New nondisplaced fracture at the right zygomatic arch with small overlying soft tissue contusion  CT head w/o, 2/16/24: Final read pending. Per my interpretation, improvement in SAH/SDH. New right high frontal SAH, not seen on initial CT head.   CTA head and neck w/wo 2/17/24:CT brain: Enlarging hypodensity within the left anterior lateral temporal lobe. There are 2 punctate hyperdensities noted within the central aspect of this hypodensity which appears to be sparing the cortex. This is less likely to represent ischemia given the lack of cortical involvement and differentials would include primarily nonhemorrhagic contusion or venous infarction. This is unlikely to represent mass given its lack of presence 4 days ago.Scattered subarachnoid hemorrhage is again present, not significantly changed.There is a mixed density small subdural hematoma within the left hemisphere, acute to early subacute without significant mass effect upon the brain parenchyma.CT angiography: No stenosis, occlusion or thrombosis of the cervical or intracranial vasculature. Dural sinuses and major cortical veins.Examination degraded by artifact from the patient's cochlear implant.    Plan:  Continue frequent neurological checks.   Repeat CT head in am  Reviewed imaging with patient and family members at bedside.  STAT CT head with  any neurological decline including drop GCS of 2pts within 1 hr.  Recommend SBP <160mmHg.  Keppra per primary team  Hold all therapeutic antiplatelet and anticoagulation medications.   Medical management and pain control per primary team  Mobilize with PT/OT  DVT PPX: SCDs, hold pharm ppx until stable scan is obtained  No neurosurgical intervention indicated at this juncture.   Recommend neurology consult.  Patient unable to obtain MRI secondary to cochlear implant.    Neurosurgery will follow from the periphery and review repeat CT head in am. Please call with questions or concerns.

## 2024-02-17 NOTE — [facility] ADMISSION
Referral received for patient consideration of [facility] placement for rehab.  Will review with [facility] physician and update CM with determination when known.

## 2024-02-17 NOTE — ASSESSMENT & PLAN NOTE
Thomas Chase is a 83 y.o. male w/ complex PMH including deafness s/p recent cochlear implant (1/17/23), SDH s/p craniotomy (5/28/22) who p/w sudden-onset BLE unclear etiology.  Did have a subsequent fall with new SDH in the territory of his chronic SDH and possible SAH components, as well as new left temporal hypodensity concerning for an evolving infarct versus other underlying lesion with edema appearing to be more vasogenic and cytotoxic at this time.  His sudden weakness may be a part of his current subacute to chronic picture of failure to thrive but cannot rule out a more acute process without further workup.  He does have intact reflexes and given the sudden onset of these symptoms and his preserved BLE movement, lower suspicion for an ascending neuromuscular condition. CT lumbar spine clear of acute injury.  Wife notes that patient is unable to have an MRI due to the cochlear implant, so he should at least have a repeat CTh and CTA.  Given the reported subacute onset of dysphagia and difficulty with maintaining appetite, could consider movement disorder, particularly given that patient appears to be intermittently with increased tone.  Could have also been a for understanding of the exam instructions given his deafness    Impression: Patient is a 83-year-old male who presented to the ED as a result of a fall.  Unknown etiology of bilateral lower extremity weakness.  Waiting further workup.    Plan:  Hold AP/AC in the setting of the new bleeds until cleared by neurosurgery  BP goals: -160 in the setting of new bleeds and possible stroke  Repeat CTH and obtain CTA to r/o cerebrovascular pathology, particularly given new bleed (would prefer MRI but at this time appears that he cannot have this in the setting of his cochlear implant, and this would likely cause significant artifact as well)  Continue monitoring on telemetry, frequent neuro checks, stat CTH for acute change; Replete lytes, goal  euglycemia (gluc < 180) and normothermia  PT/OT/PMR/ST w/ swallow eval  Consider dietary evaluation for possible malnutrition in the setting of poor p.o. intake over the last several months  Case and plan d/w attending. See attestation additional/updated recommendations  Rest of care as per primary care team

## 2024-02-17 NOTE — PROGRESS NOTES
[facility]  Progress Note  Name: Thomas Chase I  MRN: [id number]  Unit/Bed#: [facility] 615-01 I Date of Admission: 2/16/2024   Date of Service: 2/17/2024 I Hospital Day: 1    Assessment/Plan   SDH (subdural hematoma) (HCC)  Assessment & Plan  3mm left SDH, L frontal and right high frontal SAH, s/p fall  H/o L craniotomy for SDH evacuation 5/2022 by MEIR, L MMA embolization by MO  No AC/AP medications  Currently at baseline mental status, no focal deficits  Imaging with enlarging hypodensity within the left anterior lateral temporal lobe, unclear etiology at this time.    Imaging:  CT head w/o, 2/16/24: Small volume suspected acute subarachnoid hemorrhage within the left frontal/opercular region. No acute parenchymal hemorrhage or focal mass effect/midline shift. Stable 3 mm subdural collection along the inferior left frontal convexity.  No skull fracture. New nondisplaced fracture at the right zygomatic arch with small overlying soft tissue contusion  CT head w/o, 2/16/24: Final read pending. Per my interpretation, improvement in SAH/SDH. New right high frontal SAH, not seen on initial CT head.   CTA head and neck w/wo 2/17/24:CT brain: Enlarging hypodensity within the left anterior lateral temporal lobe. There are 2 punctate hyperdensities noted within the central aspect of this hypodensity which appears to be sparing the cortex. This is less likely to represent ischemia given the lack of cortical involvement and differentials would include primarily nonhemorrhagic contusion or venous infarction. This is unlikely to represent mass given its lack of presence 4 days ago.Scattered subarachnoid hemorrhage is again present, not significantly changed.There is a mixed density small subdural hematoma within the left hemisphere, acute to early subacute without significant mass effect upon the brain parenchyma.CT angiography: No stenosis, occlusion or thrombosis of the cervical or  intracranial vasculature. Dural sinuses and major cortical veins.Examination degraded by artifact from the patient's cochlear implant.    Plan:  Continue frequent neurological checks.   Repeat CT head in am  Reviewed imaging with patient and family members at bedside.  STAT CT head with any neurological decline including drop GCS of 2pts within 1 hr.  Recommend SBP <160mmHg.  Keppra per primary team  Hold all therapeutic antiplatelet and anticoagulation medications.   Medical management and pain control per primary team  Mobilize with PT/OT  DVT PPX: SCDs, hold pharm ppx until stable scan is obtained  No neurosurgical intervention indicated at this juncture.   Recommend neurology consult.  Patient unable to obtain MRI secondary to cochlear implant.    Neurosurgery will follow from the periphery and review repeat CT head in am. Please call with questions or concerns.                Subjective/Objective       Subjective: Patient mostly nonverbal.  He is able to identify his wife and son by name.  He also is able to tell us he is in the hospital and that he does not have any pain.    Objective: Comfortably sitting out in chair, NAD.    I/O         02/15 0701  02/16 0700 02/16 0701  02/17 0700 02/17 0701  02/18 0700    P.O.  15 120    Total Intake  15 120    Net  +15 +120           Unmeasured Urine Occurrence  2 x 1 x            Invasive Devices       Peripheral Intravenous Line  Duration             Peripheral IV 02/13/24 Right Hand 3 days    Peripheral IV 02/17/24 Right;Upper;Ventral (anterior) Arm <1 day              Drain  Duration             External Urinary Catheter Medium <1 day                    Physical Exam:  Vitals: Blood pressure 102/63, pulse 83, temperature 98 °F (36.7 °C), resp. rate 17, SpO2 97%.,There is no height or weight on file to calculate BMI.      General appearance: alert, appears stated age, cooperative and no distress  Head: Normocephalic, without obvious abnormality, atraumatic  Eyes:  "Tracks in room PERRL,  Neck: supple, symmetrical, trachea midline   Lungs: non labored breathing  Heart: regular heart rate  Neurologic:   Mental status: Nearly nonverbal with profound hearing loss.  Is able to identify his wife and son in room and that we are in the hospital.  Cranial nerves: grossly intact (Cranial nerves II-XII) Limited due to mental status but no gross abnormality seen  Motor: He does not follow commands but mimics well.  He is able to lift his arms off of the chair as well as his legs.  There is some right upper extremity weakness which is chronic per wife  Reflexes: 2+ and symmetric, no Carmen's or clonus appreciated        Lab Results:  Results from last 7 days   Lab Units 02/17/24  0421 02/16/24  0329 02/15/24  0424 02/14/24  0430 02/13/24  1443   WBC Thousand/uL 9.95 10.40* 6.18   < > 5.49   HEMOGLOBIN g/dL 13.1 12.6 13.7   < > 12.9   HEMATOCRIT % 40.8 37.3 41.2   < > 39.3   PLATELETS Thousands/uL 460* 433* 440*   < > 405*   NEUTROS PCT %  --  82*  --   --  77*   MONOS PCT %  --  8  --   --  11   EOS PCT %  --  0  --   --  0    < > = values in this interval not displayed.     Results from last 7 days   Lab Units 02/17/24  0421 02/16/24  0329 02/15/24  0424 02/14/24  0430   SODIUM mmol/L 136 135 139 139   POTASSIUM mmol/L 4.4 4.0 4.3 4.1   CHLORIDE mmol/L 100 100 103 104   CO2 mmol/L 29 26 30 27   BUN mg/dL 15 14 17 15   CREATININE mg/dL 0.55* 0.53* 0.58* 0.56*   CALCIUM mg/dL 8.9 8.5 8.6 9.2   ALK PHOS U/L  --  79  --  87   ALT U/L  --  33  --  23   AST U/L  --  36  --  42*     Results from last 7 days   Lab Units 02/14/24  0430   MAGNESIUM mg/dL 2.2     Results from last 7 days   Lab Units 02/14/24  0430   PHOSPHORUS mg/dL 3.1         No results found for: \"TROPONINT\"  ABG:No results found for: \"PHART\", \"KUN6HWQ\", \"PO2ART\", \"DVP3RNU\", \"C9ZQBTAC\", \"BEART\", \"SOURCE\"    Imaging Studies: I have personally reviewed pertinent reports.   and I have personally reviewed pertinent films in " PACS    CTA head and neck w wo contrast    Result Date: 2/17/2024  Impression: CT brain: Enlarging hypodensity within the left anterior lateral temporal lobe. There are 2 punctate hyperdensities noted within the central aspect of this hypodensity which appears to be sparing the cortex. This is less likely to represent ischemia given the lack of cortical involvement and differentials would include primarily nonhemorrhagic contusion or venous infarction. This is unlikely to represent mass given its lack of presence 4 days ago. Scattered subarachnoid hemorrhage is again present, not significantly changed. There is a mixed density small subdural hematoma within the left hemisphere, acute to early subacute without significant mass effect upon the brain parenchyma. CT angiography: No stenosis, occlusion or thrombosis of the cervical or intracranial vasculature. Dural sinuses and major cortical veins. Examination degraded by artifact from the patient's cochlear implant. Workstation performed: [id number]     CT head wo contrast    Result Date: 2/16/2024  Impression: 1.  Unfortunately significant artifact from cochlear implant degrades image quality in the left temporal lobe where there is possible area of hypodensity concerning for acute/subacute infarct. 2.  New small volume acute subarachnoid hemorrhage in the paramedian right frontal vertex as well as trace new subarachnoid hemorrhage within the deep right sylvian fissure. 3.  Significantly improved recent subarachnoid hemorrhage in the inferior left frontal lobe. Minimal new subarachnoid hemorrhage versus redistribution in the inferior left parietal lobe. 4.  Stable 4 mm thick primarily hypoattenuating subdural collection/hematoma over the left frontoparietal lobes. I personally discussed this study with GERSON MAGANA on 2/16/2024 1:45 PM. Workstation performed: [id number]     CT spine cervical wo contrast    Result Date: 2/16/2024  Impression: No cervical spine  fracture or traumatic malalignment. Stable CT C-spine examination compared to prior Workstation performed: [id number]     CT head wo contrast    Result Date: 2/16/2024  Impression: Small volume suspected acute subarachnoid hemorrhage within the left frontal/opercular region. No acute parenchymal hemorrhage or focal mass effect/midline shift. Stable 3 mm subdural collection along the inferior left frontal convexity. No skull fracture. New nondisplaced fracture at the right zygomatic arch with small overlying soft tissue contusion. Study was marked in EPIC for immediate notification. Workstation performed: [id number]       EKG, Pathology, and Other Studies: I have personally reviewed pertinent reports.        VTE Mechanical Prophylaxis: sequential compression device

## 2024-02-17 NOTE — PLAN OF CARE
Problem: OCCUPATIONAL THERAPY ADULT  Goal: Performs self-care activities at highest level of function for planned discharge setting.  See evaluation for individualized goals.  Description: Treatment Interventions: ADL retraining, Functional transfer training, Visual perceptual retraining, UE strengthening/ROM, Endurance training, Cognitive reorientation, Patient/family training, Equipment evaluation/education, Fine motor coordination activities, Continued evaluation, Energy conservation, Activityengagement          See flowsheet documentation for full assessment, interventions and recommendations.   Outcome: Progressing  Note: Limitation: Decreased ADL status, Decreased Safe judgement during ADL, Decreased UE strength, Decreased cognition, Decreased endurance, Decreased self-care trans, Decreased high-level ADLs  Prognosis: Fair  Assessment: Pt is a 83 y.o. male who was admitted to [facility] on 2/16/2024 with BLE weakness following a fall with new SDH in territory of his chronic SDH and possible SAH components, as well as new L temporal hypodensity concerning for an evolving infarct vs other underlying lesion with edema. Pt seen for an OT evaluation per active OT orders, CT head on 2/16 showing small volume L frontal SAH suspected no midline shift.  Pt  has a past medical history of Arthritis, Encounter for general adult medical examination without abnormal findings, Fall, Hyperlipidemia, Macular degeneration, wet (HCC), and Urinary retention. Pt also s/p craniotomy with SD drain placement on 5/28/22, deafness s/p cochlear implant on 1/17/23. Pt is a poor historian on this date, per EMR pt lives with spouse in a multi-story home, [address], with pt indicating he uses a shower chair in the shower, per CM pt also uses a commode. Pta, pt was independent w/ ADL and functional mobility, per CM pt uses a quad cane. Currently, pt is Mod Ax1 for UB ADL, Max Ax1 for LB ADL, and completed transfers/FM w Mod Ax1 with  RW. Pt currently presents with impairments in the following categories -steps to enter environment and difficulty performing ADLS activity tolerance, endurance, standing balance/tolerance, UE strength, memory, insight, safety , judgement , attention , sequencing , and communication. These impairments, as well as pt's fatigue and risk for falls  limit pt's ability to safely engage in all baseline areas of occupation, includingeating, grooming, bathing, dressing, toileting, and functional mobility/transfers.    The patient's raw score on the -PAC Daily Activity Inpatient Short Form is 14. A raw score of less than 19 suggests the patient may benefit from discharge to post-acute rehabilitation services. Please refer to the recommendation of the Occupational Therapist for safe discharge planning. Pt would benefit from continued acute OT services throughout hospital course and following D/C. Plan for OT interventions 2-3x per week. From OT standpoint, recommend post acute rehab services upon D/C. Pt was left seated in bedside chair with chair alarm on and all needs within reach.     Rehab Resource Intensity Level, OT: I (Maximum Resource Intensity)

## 2024-02-17 NOTE — PLAN OF CARE
Problem: PHYSICAL THERAPY ADULT  Goal: Performs mobility at highest level of function for planned discharge setting.  See evaluation for individualized goals.  Description: Treatment/Interventions: Functional transfer training, LE strengthening/ROM, Elevations, Therapeutic exercise, Endurance training, Cognitive reorientation, Bed mobility, Gait training, Spoke to nursing, OT  Equipment Recommended: Walker       See flowsheet documentation for full assessment, interventions and recommendations.  Note: Prognosis: Fair  Problem List: Decreased strength, Decreased endurance, Impaired balance, Decreased mobility, Decreased cognition, Decreased safety awareness  Assessment: Pt is 83 y.o. male admitted with hx of fall and Dx of ICH and debility. Pt 's comorbidities affecting POC include: Arthritis, Fall, Macular degeneration, hearing loss s/p L cochlear implant, h/o L SDH evacuation in 2022 and Urinary retention and personal factors of: advanced age, HATTIE and steps in the house. Pt's clinical presentation is currently  unstable/unpredictable which is evident in ongoing telem monitoring, abn lab values and inability to progress further w/ mobilization at this time due to ongoing incontinence. Pt presents w/ general disorientation, overall weakness, incl decreased LE strength, decreased functional endurance, impaired balance, gait deviations (a few steps at bedside), and fall risk. Will cont to follow pt in PT for progressive mobilization to address above functional deficits and to max level of (I), endurance, and safety. Currently recommend rehab upon D/C. Will cont to follow until then.  Barriers to Discharge: Inaccessible home environment     Rehab Resource Intensity Level, PT: II (Moderate Resource Intensity)    See flowsheet documentation for full assessment.

## 2024-02-17 NOTE — PLAN OF CARE
Problem: Potential for Falls  Goal: Patient will remain free of falls  Description: INTERVENTIONS:  - Educate patient/family on patient safety including physical limitations  - Instruct patient to call for assistance with activity   - Consult OT/PT to assist with strengthening/mobility   - Keep Call bell within reach  - Keep bed low and locked with side rails adjusted as appropriate  - Keep care items and personal belongings within reach  - Initiate and maintain comfort rounds  - Make Fall Risk Sign visible to staff  - Offer Toileting every 2 Hours, in advance of need  - Initiate/Maintain bed/chair alarm  - Obtain necessary fall risk management equipment: nonskid socks  - Apply yellow socks and bracelet for high fall risk patients  - Consider moving patient to room near nurses station  Outcome: Progressing     Problem: Prexisting or High Potential for Compromised Skin Integrity  Goal: Skin integrity is maintained or improved  Description: INTERVENTIONS:  - Identify patients at risk for skin breakdown  - Assess and monitor skin integrity  - Assess and monitor nutrition and hydration status  - Monitor labs   - Assess for incontinence   - Turn and reposition patient  - Assist with mobility/ambulation  - Relieve pressure over bony prominences  - Avoid friction and shearing  - Provide appropriate hygiene as needed including keeping skin clean and dry  - Evaluate need for skin moisturizer/barrier cream  - Collaborate with interdisciplinary team   - Patient/family teaching  - Consider wound care consult   Outcome: Progressing     Problem: PAIN - ADULT  Goal: Verbalizes/displays adequate comfort level or baseline comfort level  Description: Interventions:  - Encourage patient to monitor pain and request assistance  - Assess pain using appropriate pain scale  - Administer analgesics based on type and severity of pain and evaluate response  - Implement non-pharmacological measures as appropriate and evaluate response  -  Consider cultural and social influences on pain and pain management  - Notify physician/advanced practitioner if interventions unsuccessful or patient reports new pain  Outcome: Progressing     Problem: INFECTION - ADULT  Goal: Absence or prevention of progression during hospitalization  Description: INTERVENTIONS:  - Assess and monitor for signs and symptoms of infection  - Monitor lab/diagnostic results  - Monitor all insertion sites, i.e. indwelling lines, tubes, and drains  - Monitor endotracheal if appropriate and nasal secretions for changes in amount and color  - [place] appropriate cooling/warming therapies per order  - Administer medications as ordered  - Instruct and encourage patient and family to use good hand hygiene technique  - Identify and instruct in appropriate isolation precautions for identified infection/condition  Outcome: Progressing  Goal: Absence of fever/infection during neutropenic period  Description: INTERVENTIONS:  - Monitor WBC    Outcome: Progressing     Problem: SAFETY ADULT  Goal: Patient will remain free of falls  Description: INTERVENTIONS:  - Educate patient/family on patient safety including physical limitations  - Instruct patient to call for assistance with activity   - Consult OT/PT to assist with strengthening/mobility   - Keep Call bell within reach  - Keep bed low and locked with side rails adjusted as appropriate  - Keep care items and personal belongings within reach  - Initiate and maintain comfort rounds  - Make Fall Risk Sign visible to staff  - Offer Toileting every 2 Hours, in advance of need  - Initiate/Maintain bed/chair alarm  - Obtain necessary fall risk management equipment: nonskid socks  - Apply yellow socks and bracelet for high fall risk patients  - Consider moving patient to room near nurses station  Outcome: Progressing  Goal: Maintain or return to baseline ADL function  Description: INTERVENTIONS:  -  Assess patient's ability to carry out ADLs; assess  patient's baseline for ADL function and identify physical deficits which impact ability to perform ADLs (bathing, care of mouth/teeth, toileting, grooming, dressing, etc.)  - Assess/evaluate cause of self-care deficits   - Assess range of motion  - Assess patient's mobility; develop plan if impaired  - Assess patient's need for assistive devices and provide as appropriate  - Encourage maximum independence but intervene and supervise when necessary  - Involve family in performance of ADLs  - Assess for home care needs following discharge   - Consider OT consult to assist with ADL evaluation and planning for discharge  - Provide patient education as appropriate  Outcome: Progressing  Goal: Maintains/Returns to pre admission functional level  Description: INTERVENTIONS:  - Perform AM-PAC 6 Click Basic Mobility/ Daily Activity assessment daily.  - Set and communicate daily mobility goal to care team and patient/family/caregiver.   - Collaborate with rehabilitation services on mobility goals if consulted  - Perform Range of Motion times a day.  - Reposition patient every  hours.  - Dangle patient  times a day  - Stand patient  times a day  - Ambulate patient  times a day  - Out of bed to chair  times a day   - Out of bed for meals  times a day  - Out of bed for toileting  - Record patient progress and toleration of activity level   Outcome: Progressing     Problem: DISCHARGE PLANNING  Goal: Discharge to home or other facility with appropriate resources  Description: INTERVENTIONS:  - Identify barriers to discharge w/patient and caregiver  - Arrange for needed discharge resources and transportation as appropriate  - Identify discharge learning needs (meds, wound care, etc.)  - Arrange for interpretive services to assist at discharge as needed  - Refer to Case Management Department for coordinating discharge planning if the patient needs post-hospital services based on physician/advanced practitioner order or complex needs  related to functional status, cognitive ability, or social support system  Outcome: Progressing     Problem: Knowledge Deficit  Goal: Patient/family/caregiver demonstrates understanding of disease process, treatment plan, medications, and discharge instructions  Description: Complete learning assessment and assess knowledge base.  Interventions:  - Provide teaching at level of understanding  - Provide teaching via preferred learning methods  Outcome: Progressing

## 2024-02-17 NOTE — OCCUPATIONAL THERAPY NOTE
Occupational Therapy Evaluation and Treatment     Patient Name: Thomas Chase  Today's Date: 2/17/2024  Problem List  Active Problems:    Hypercholesterolemia    SDH (subdural hematoma) (HCC)    Debility    Intracranial hemorrhage (HCC)    Bilateral lower extremity weakness    Past Medical History  Past Medical History:   Diagnosis Date    Arthritis     Encounter for general adult medical examination without abnormal findings 03/20/2019    Fall 11/03/2022    Hyperlipidemia     Macular degeneration, wet (HCC)     Urinary retention 06/02/2022     Past Surgical History  Past Surgical History:   Procedure Laterality Date    BRAIN HEMATOMA EVACUATION Left 05/28/2022    Procedure: left CRANIOTOMY FOR SUBDURAL HEMATOMA;  Surgeon: Chrsi Watts MD;  Location: [facility];  Service: Neurosurgery    CARPAL TUNNEL RELEASE      HERNIA REPAIR Right     inguinal    IR CEREBRAL ANGIOGRAPHY / INTERVENTION  06/02/2022    VA COCHLEAR DEVICE IMPLANTATION W/WO MASTOIDECTOMY Left 1/17/2023    Procedure: LEFT COCHLEAR IMPLANTATION WITH MASTOIDECTOMY AND FACIAL NERVE MONITORING WITH AUDIOMETRIC TESTING OF THE IMPLANT;  Surgeon: Susie Tuttle MD;  Location: [facility];  Service: ENT    VA NEUROPLASTY &/TRANSPOS MEDIAN NRV CARPAL TUNNE Right 09/20/2021    Procedure: RELEASE CARPAL TUNNEL;  Surgeon: Bruno Segovia MD;  Location: [facility];  Service: Orthopedics              02/17/24 0939   OT Last Visit   OT Visit Date 02/17/24   Note Type   Note type Evaluation   Pain Assessment   Pain Assessment Tool FLACC   Pain Rating: FLACC (Rest) - Face 0   Pain Rating: FLACC (Rest) - Legs 0   Pain Rating: FLACC (Rest) - Activity 0   Pain Rating: FLACC (Rest) - Cry 0   Pain Rating: FLACC (Rest) - Consolability 0   Score: FLACC (Rest) 0   Pain Rating: FLACC (Activity) - Face 0   Pain Rating: FLACC (Activity) - Legs 0   Pain Rating: FLACC (Activity) - Activity 0   Pain Rating: FLACC (Activity) - Cry 0   Pain Rating: FLACC (Activity)  "- Consolability 0   Score: FLACC (Activity) 0   Restrictions/Precautions   Weight Bearing Precautions Per Order No   Other Precautions Cognitive;Chair Alarm;Bed Alarm;Aspiration;Fall Risk;Hard of hearing  (Pt extremely [place] requiring whiteboard in room, incontinent of urine)   Home Living   Type of Home House   Home Layout Multi-level;Bed/bath upstairs;Able to live on main level with bedroom/bathroom;1/2 bath on main level;Stairs to enter with rails  (1 HATTIE)   Bathroom Shower/Tub   (pt nods \"yes\" when asked if he sits on chair in the shower)   Bathroom Equipment Shower chair   Bathroom Accessibility Accessible   Home Equipment Quad cane  (per CM)   Additional Comments Pt a poor historian, limited by very [place], does not consistently respond to questions written on white board. Per EMR pt lives in a multi-story home with 1 HATTIE, able to stay on 1st fl, with pt indicating he uses a shower chair.   Prior Function   Level of [place] Independent with ADLs;Independent with functional mobility  (per prev. EMR)   Lives With Spouse   Receives Help From Family   IADLs   (unknown IADL baseline)   Falls in the last 6 months   (per EMR pt with hx fo falls, recent fall)   Vocational Retired   Lifestyle   Autonomy Pt a poor historian although per prev. EMR pt I with ADL and fxnal mobility   Reciprocal Relationships supportive spouse   Intrinsic Gratification none stated   Subjective   Subjective Per EMR, according to spouse pt mostly nonverbal at baseline   ADL   Where Assessed Chair   Eating Assistance 4  Minimal Assistance   Grooming Assistance 4  Minimal Assistance   UB Bathing Assistance 3  Moderate Assistance   LB Bathing Assistance 2  Maximal Assistance   UB Dressing Assistance 3  Moderate Assistance   LB Dressing Assistance 2  Maximal Assistance   LB Dressing Deficit Don/doff R sock;Don/doff L sock   Toileting Assistance  2  Maximal Assistance   Toileting Deficit   (pt incontinent of urine with each STS)   Functional " Assistance 3  Moderate Assistance   Bed Mobility   Supine to Sit 3  Moderate assistance   Additional items Assist x 1;Increased time required   Additional Comments Pt greeted in bed, left in chair with all needs within reach and alarm on   Transfers   Sit to Stand 3  Moderate assistance   Additional items Assist x 1;Increased time required   Stand to Sit 3  Moderate assistance   Additional items Assist x 1;Increased time required   Additional Comments STS x 2 with RW, physical and visual cues, pt incontinent of urine during each STS   Functional Mobility   Functional Mobility 3  Moderate assistance   Additional Comments Pt performs a couple steps bed>chair with MOD A x 1 with RW, physical and visual cues for direction   Additional items Rolling walker   Balance   Static Sitting Fair +   Dynamic Sitting Fair   Static Standing Poor +   Dynamic Standing Poor +   Ambulatory Poor   Activity Tolerance   Activity Tolerance Patient limited by fatigue;Other (Comment)  (extremely [place], cognitive status)   Medical Staff Made Aware Co-eval with DPT due to high medical complexity   Nurse Made Aware RN cleared for therapy   RUE Assessment   RUE Assessment WFL   RUE Strength   RUE Overall Strength Deficits  (~3+/5)   LUE Assessment   LUE Assessment WFL   LUE Strength   LUE Overall Strength Deficits  (~3+/5)   Sensation   Light Touch No apparent deficits   Vision-Basic Assessment   Current Vision Wears glasses only for reading   Psychosocial   Psychosocial (WDL) WDL   Cognition   Overall Cognitive Status Impaired   Arousal/Participation Cooperative   Attention Attends with cues to redirect   Orientation Level Oriented to person   Memory Decreased recall of biographical information;Decreased recall of recent events   Following Commands Follows one step commands with increased time or repetition   Comments Pt cooperative to therapy although extremely [place] requiring whiteboard for communication however pt also not with consistent  responses to white board communication. Pt follows commands with visual/physical cues throughout transfers.   Assessment   Limitation Decreased ADL status;Decreased Safe judgement during ADL;Decreased UE strength;Decreased cognition;Decreased endurance;Decreased self-care trans;Decreased high-level ADLs   Prognosis Fair   Assessment Pt is a 83 y.o. male who was admitted to [facility] on 2/16/2024 with BLE weakness following a fall with new SDH in territory of his chronic SDH and possible SAH components, as well as new L temporal hypodensity concerning for an evolving infarct vs other underlying lesion with edema. Pt seen for an OT evaluation per active OT orders, CT head on 2/16 showing small volume L frontal SAH suspected no midline shift.  Pt  has a past medical history of Arthritis, Encounter for general adult medical examination without abnormal findings, Fall, Hyperlipidemia, Macular degeneration, wet (HCC), and Urinary retention. Pt also s/p craniotomy with SD drain placement on 5/28/22, deafness s/p cochlear implant on 1/17/23. Pt is a poor historian on this date, per EMR pt lives with spouse in a multi-story home, 1 HATTIE, with pt indicating he uses a shower chair in the shower, per CM pt also uses a commode. Pta, pt was independent w/ ADL and functional mobility, per CM pt uses a quad cane. Currently, pt is Mod Ax1 for UB ADL, Max Ax1 for LB ADL, and completed transfers/FM w Mod Ax1 with RW. Pt currently presents with impairments in the following categories -steps to enter environment and difficulty performing ADLS activity tolerance, endurance, standing balance/tolerance, UE strength, memory, insight, safety , judgement , attention , sequencing , and communication. These impairments, as well as pt's fatigue and risk for falls  limit pt's ability to safely engage in all baseline areas of occupation, includingeating, grooming, bathing, dressing, toileting, and functional mobility/transfers.    The  patient's raw score on the AM-PAC Daily Activity Inpatient Short Form is 14. A raw score of less than 19 suggests the patient may benefit from discharge to post-acute rehabilitation services. Please refer to the recommendation of the Occupational Therapist for safe discharge planning. Pt would benefit from continued acute OT services throughout hospital course and following D/C. Plan for OT interventions 2-3x per week. From OT standpoint, recommend post acute rehab services upon D/C. Pt was left seated in bedside chair with chair alarm on and all needs within reach.   Goals   Patient Goals none expressed   Plan   Treatment Interventions ADL retraining;Functional transfer training;Visual perceptual retraining;UE strengthening/ROM;Endurance training;Cognitive reorientation;Patient/family training;Equipment evaluation/education;Fine motor coordination activities;Continued evaluation;Energy conservation;Activityengagement   Goal Expiration Date 03/02/24   OT Frequency 2-3x/wk   Discharge Recommendation   Rehab Resource Intensity Level, OT I (Maximum Resource Intensity)   AM-PAC Daily Activity Inpatient   Lower Body Dressing 2   Bathing 2   Toileting 2   Upper Body Dressing 2   Grooming 3   Eating 3   Daily Activity Raw Score 14   Daily Activity Standardized Score (Calc for Raw Score >=11) 33.39   -PAC Applied Cognition Inpatient   Following a Speech/Presentation 3   Understanding Ordinary Conversation 2   Taking Medications 2   Remembering Where Things Are Placed or Put Away 1   Remembering List of 4-5 Errands 1   Taking Care of Complicated Tasks 1   Applied Cognition Raw Score 10   Applied Cognition Standardized Score 24.98   Modified [place] Scale   Modified [place] Scale 4   Additional Treatment Session   Start Time 0929   End Time 0939   Treatment Assessment Pt participates in follow-up tx session focused on ADL retraining. Pt requiring MAX A for LB dressing to doff/don socks at this time, MAX A for LB bathing of  thighs and distal LE/feet. Pt greatly limited by [place] and cognitive status at this time. Pt would benefit from continued OT services 2/3x per week. Continue to recommend post acute rehab services at d/c.   Additional Treatment Day 1   End of Consult   Education Provided Yes   Patient Position at End of Consult Bedside chair;Bed/Chair alarm activated;All needs within reach   Nurse Communication Nurse aware of consult       OT Goals    - Pt will be Min A  with LB ADL by end of hospital course.    - Pt will be Supervision with UB ADL by end of hospital course.    - Pt will be Supervision with all functional transfers required for patient safety by end of hospital course.    - Pt will be Supervision with functional mobility to/from bathroom for increased independence with toileting tasks.    - Pt will implement safety precautions during OT sessions with min cues.     - Pt activity tolerance will increase to 20 minutes in order to safely engage in ADL and transfers.     - Pt standing tolerance will increase to 3 minutes  in order to promote sink side ADLs and IADL activities.    - Pt will consistently follow one-step directions with min to no vc or prompting.     - Pt will attend to functional tasks for 10 minutes with min to no vc for attention/redirection.    - Pt will attend to and complete continuous cognitive assessment in order to inform safe discharge planning.    - Pt UE strength will improve by 1 MM grade by end of hospital course in order to promote UB ADL and safe transfers.        VALERIA Rayo, OTR/L

## 2024-02-18 ENCOUNTER — APPOINTMENT (INPATIENT)
Dept: RADIOLOGY | Facility: HOSPITAL | Age: 84
DRG: 085 | End: 2024-02-18
Payer: MEDICARE

## 2024-02-18 PROBLEM — N39.0 UTI (URINARY TRACT INFECTION): Status: ACTIVE | Noted: 2024-02-18

## 2024-02-18 LAB
ANION GAP SERPL CALCULATED.3IONS-SCNC: 6 MMOL/L
BASOPHILS # BLD AUTO: 0.03 THOUSANDS/ÂΜL (ref 0–0.1)
BASOPHILS NFR BLD AUTO: 0 % (ref 0–1)
BUN SERPL-MCNC: 21 MG/DL (ref 5–25)
CALCIUM SERPL-MCNC: 8.8 MG/DL (ref 8.4–10.2)
CHLORIDE SERPL-SCNC: 101 MMOL/L (ref 96–108)
CO2 SERPL-SCNC: 30 MMOL/L (ref 21–32)
CREAT SERPL-MCNC: 0.53 MG/DL (ref 0.6–1.3)
EOSINOPHIL # BLD AUTO: 0.02 THOUSAND/ÂΜL (ref 0–0.61)
EOSINOPHIL NFR BLD AUTO: 0 % (ref 0–6)
ERYTHROCYTE [DISTWIDTH] IN BLOOD BY AUTOMATED COUNT: 13.7 % (ref 11.6–15.1)
GFR SERPL CREATININE-BSD FRML MDRD: 97 ML/MIN/1.73SQ M
GLUCOSE SERPL-MCNC: 108 MG/DL (ref 65–140)
HCT VFR BLD AUTO: 39.9 % (ref 36.5–49.3)
HGB BLD-MCNC: 12.9 G/DL (ref 12–17)
IMM GRANULOCYTES # BLD AUTO: 0.08 THOUSAND/UL (ref 0–0.2)
IMM GRANULOCYTES NFR BLD AUTO: 1 % (ref 0–2)
LYMPHOCYTES # BLD AUTO: 0.78 THOUSANDS/ÂΜL (ref 0.6–4.47)
LYMPHOCYTES NFR BLD AUTO: 6 % (ref 14–44)
MCH RBC QN AUTO: 30.7 PG (ref 26.8–34.3)
MCHC RBC AUTO-ENTMCNC: 32.3 G/DL (ref 31.4–37.4)
MCV RBC AUTO: 95 FL (ref 82–98)
MONOCYTES # BLD AUTO: 0.82 THOUSAND/ÂΜL (ref 0.17–1.22)
MONOCYTES NFR BLD AUTO: 6 % (ref 4–12)
NEUTROPHILS # BLD AUTO: 11.83 THOUSANDS/ÂΜL (ref 1.85–7.62)
NEUTS SEG NFR BLD AUTO: 87 % (ref 43–75)
NRBC BLD AUTO-RTO: 0 /100 WBCS
PLATELET # BLD AUTO: 436 THOUSANDS/UL (ref 149–390)
PMV BLD AUTO: 9.2 FL (ref 8.9–12.7)
POTASSIUM SERPL-SCNC: 4.3 MMOL/L (ref 3.5–5.3)
RBC # BLD AUTO: 4.2 MILLION/UL (ref 3.88–5.62)
SODIUM SERPL-SCNC: 137 MMOL/L (ref 135–147)
WBC # BLD AUTO: 13.56 THOUSAND/UL (ref 4.31–10.16)

## 2024-02-18 PROCEDURE — 70470 CT HEAD/BRAIN W/O & W/DYE: CPT

## 2024-02-18 PROCEDURE — 80048 BASIC METABOLIC PNL TOTAL CA: CPT | Performed by: SURGERY

## 2024-02-18 PROCEDURE — 99232 SBSQ HOSP IP/OBS MODERATE 35: CPT | Performed by: STUDENT IN AN ORGANIZED HEALTH CARE EDUCATION/TRAINING PROGRAM

## 2024-02-18 PROCEDURE — G1004 CDSM NDSC: HCPCS

## 2024-02-18 PROCEDURE — 85025 COMPLETE CBC W/AUTO DIFF WBC: CPT | Performed by: SURGERY

## 2024-02-18 PROCEDURE — 99232 SBSQ HOSP IP/OBS MODERATE 35: CPT | Performed by: SURGERY

## 2024-02-18 RX ADMIN — Medication 1 TABLET: at 08:14

## 2024-02-18 RX ADMIN — SERTRALINE HYDROCHLORIDE 25 MG: 25 TABLET ORAL at 08:13

## 2024-02-18 RX ADMIN — ACETAMINOPHEN 975 MG: 325 TABLET, FILM COATED ORAL at 16:59

## 2024-02-18 RX ADMIN — LEVETIRACETAM 500 MG: 100 INJECTION, SOLUTION INTRAVENOUS at 08:14

## 2024-02-18 RX ADMIN — SENNOSIDES 8.6 MG: 8.6 TABLET, FILM COATED ORAL at 21:49

## 2024-02-18 RX ADMIN — DOCUSATE SODIUM 100 MG: 100 CAPSULE, LIQUID FILLED ORAL at 08:14

## 2024-02-18 RX ADMIN — IOHEXOL 85 ML: 350 INJECTION, SOLUTION INTRAVENOUS at 04:59

## 2024-02-18 RX ADMIN — LORATADINE 10 MG: 10 TABLET ORAL at 08:14

## 2024-02-18 RX ADMIN — LEVETIRACETAM 500 MG: 100 INJECTION, SOLUTION INTRAVENOUS at 21:49

## 2024-02-18 RX ADMIN — CEFTRIAXONE SODIUM 1000 MG: 10 INJECTION, POWDER, FOR SOLUTION INTRAVENOUS at 00:47

## 2024-02-18 RX ADMIN — PRAVASTATIN SODIUM 40 MG: 40 TABLET ORAL at 16:58

## 2024-02-18 RX ADMIN — ACETAMINOPHEN 975 MG: 325 TABLET, FILM COATED ORAL at 06:25

## 2024-02-18 RX ADMIN — Medication 6 MG: at 21:49

## 2024-02-18 RX ADMIN — DOCUSATE SODIUM 100 MG: 100 CAPSULE, LIQUID FILLED ORAL at 18:11

## 2024-02-18 NOTE — MALNUTRITION/BMI
This medical record reflects one or more clinical indicators suggestive of malnutrition and underweight.    Malnutrition Findings:   Adult Malnutrition type: Chronic illness  Adult Degree of Malnutrition: Other severe protein calorie malnutrition  Malnutrition Characteristics: Fat loss, Muscle loss, Weight loss                  360 Statement: severe protein calorie malnutrition in the context of chronic illness; evidenced by muscle wasting to clavicle and temples; subcutaneous fat los to orbital and buccal regions; significant weight loss of 13% x6 months; Treated with oral diet and nutrition supplements    BMI Findings:  Adult BMI Classifications: Underweight < 18.5        Body mass index is 17.09 kg/m².     See Nutrition note dated 2/18/24 for additional details.  Completed nutrition assessment is viewable in the nutrition documentation.

## 2024-02-18 NOTE — ASSESSMENT & PLAN NOTE
3mm left SDH, L frontal and right high frontal SAH, s/p fall  H/o L craniotomy for SDH evacuation 5/2022 by MEIR, L MMA embolization by MO  No AC/AP medications  Currently at baseline mental status, no focal deficits  Imaging with enlarging hypodensity within the left anterior lateral temporal lobe, unclear etiology at this time.    Imaging:  CT head w/wo 2/18/24:Once again identified is a masslike area of low density within the left anterior lateral temporal lobe with 2 punctate hyperdensities noted within. See prior report. Differential considerations continue to include nonhemorrhagic contusion, venous infarction. Neoplastic process is considered less likely given the rapid presentation. Continued surveillance imaging recommended.Slight improvement in the mild peripheral subarachnoid hemorrhage within the cerebral hemispheres with no new hemorrhage.Improvement in the previously identified left parietal subdural hematoma. Stable small anterior frontal subdural hematoma. Neither demonstrate significant mass effect upon the adjacent brain.    Plan:  Continue frequent neurological checks.   Reviewed imaging with patient and family members at bedside.  STAT CT head with any neurological decline including drop GCS of 2pts within 1 hr.  Recommend SBP <160mmHg.  Keppra per primary team  Hold all therapeutic antiplatelet and anticoagulation medications.   Medical management and pain control per primary team  Mobilize with PT/OT  DVT PPX: SCDs  No neurosurgical intervention indicated at this juncture.   Neurology following, input appreciated  Patient unable to obtain MRI secondary to cochlear implant.    Neurosurgery will sign off, patient will follow-up in 2 weeks with repeat CT head, call with any further question concerns.

## 2024-02-18 NOTE — DISCHARGE INSTR - AVS FIRST PAGE
Neurosurgery discharge instructions following traumatic head bleed:     Do not take any blood thinning medications (ie. No Advil. No motrin. No ibuprofen. No Aleve. No Aspirin. No fishoil. No heparin. No antiplatelet / no anticoagulation medication).  Refrain from activity that increases chance of trauma to head or falls. Recommend you take fall precaution.  No strenuous activity or sports.  Return to hospital Emergency Room if you experience worsening / new headache, nausea/vomiting, speech/vision change, seizure, confusion / mental status change, weakness, or other neurological changes.      Follow-up as scheduled with a repeat CT head without contrast to be completed 2-3 days prior to visit.  Prescription has been entered electronically.  Please call [phone] to schedule.         Trauma Discharge Instructions:    Please follow-up as instructed. If you need a follow-up appointment, please call the office when you leave to schedule an appointment.    Activity:  - PT and OT evaluation and treatment as indicated.  - You may resume activity as tolerated.  - Walking and normal light activities are encouraged.  - Normal daily activities including climbing steps are okay.  - No driving until no longer using pain medications.    Return to work:    - You may return to work once cleared by the Trauma Service.    Diet:    - You may resume your normal diet.    Medications:  - You should continue your current medication regimen after discharge unless otherwise instructed. Please refer to your discharge medication list for further details.  - Please take the pain medications as directed.  - You are encouraged to use non-narcotic pain medications first and whenever possible. Reserve the use of narcotic pain medication for moderate to severe pain not controlled by non-narcotic medications.  - No driving while taking narcotic pain medications.  - You may become constipated, especially if taking pain medications. You may take  any over the counter stool softeners or laxatives as needed. Examples: Milk of Magnesia, Colace, Senna.    Additional Instructions:  - May shower daily.  - If you have any questions or concerns after discharge please call the office.  - Call office or return to ER if fever greater than 101, chills, persistent nausea/vomiting, worsening/uncontrollable pain, develop productive cough, increasing shortness of breath, difficulty breathing, and/or increasing redness or purulent/foul smelling drainage from incision(s).

## 2024-02-18 NOTE — ASSESSMENT & PLAN NOTE
- CT scan of the head on 2/16 reviewed: Small volume left frontal subarachnoid hemorrhage suspected no midline shift   -CTA 12/17- Enlarging hypodensity within the left anterior lateral temporal lobe. There are 2 punctate hyperdensities noted within the central aspect of this hypodensity which appears to be sparing the cortex. This is less likely to represent ischemia given the lack of cortical involvement and differentials would include primarily nonhemorrhagic contusion or venous infarction  -F/u repeat CT head this AM  -ECHO- EF 60%, unremarkable  -Appreciate neurology, neurosurgery recommendations  - Continue neurologic checks: Every 1 hours.  - Reversal agent administered: Patient does not take anti-platelet or anti-coagulant medications. No reversal agent indicated.   - Complete 7 day course of Keppra for seizure prophylaxis  - Chemical DVT prophylaxis: Will restart when cleared by Neurosurgery  - Hold all anticoagulants and anti platelet medications for 2 weeks and/or until cleared by Neurosurgery to resume.  - PT and OT (including cognitive evaluation) evaluation and treatment as indicated.

## 2024-02-18 NOTE — ASSESSMENT & PLAN NOTE
- Patient with history of subdural hematoma s/p craniotomy and SD drain placement x 2 in May of 2022  - Neurosurgery consult and recommendations appreciated

## 2024-02-18 NOTE — PROGRESS NOTES
[facility]  Progress Note  Name: Thomas Chase I  MRN: [id number]  Unit/Bed#: [facility] 615-01 I Date of Admission: 2/16/2024   Date of Service: 2/18/2024 I Hospital Day: 2    Assessment/Plan   SDH (subdural hematoma) (HCC)  Assessment & Plan  3mm left SDH, L frontal and right high frontal SAH, s/p fall  H/o L craniotomy for SDH evacuation 5/2022 by MIER, L MMA embolization by MO  No AC/AP medications  Currently at baseline mental status, no focal deficits  Imaging with enlarging hypodensity within the left anterior lateral temporal lobe, unclear etiology at this time.    Imaging:  CT head w/wo 2/18/24:Once again identified is a masslike area of low density within the left anterior lateral temporal lobe with 2 punctate hyperdensities noted within. See prior report. Differential considerations continue to include nonhemorrhagic contusion, venous infarction. Neoplastic process is considered less likely given the rapid presentation. Continued surveillance imaging recommended.Slight improvement in the mild peripheral subarachnoid hemorrhage within the cerebral hemispheres with no new hemorrhage.Improvement in the previously identified left parietal subdural hematoma. Stable small anterior frontal subdural hematoma. Neither demonstrate significant mass effect upon the adjacent brain.    Plan:  Continue frequent neurological checks.   Reviewed imaging with patient and family members at bedside.  STAT CT head with any neurological decline including drop GCS of 2pts within 1 hr.  Recommend SBP <160mmHg.  Keppra per primary team  Hold all therapeutic antiplatelet and anticoagulation medications.   Medical management and pain control per primary team  Mobilize with PT/OT  DVT PPX: SCDs  No neurosurgical intervention indicated at this juncture.   Neurology following, input appreciated  Patient unable to obtain MRI secondary to cochlear implant.    Neurosurgery will sign off, patient will  follow-up in 2 weeks with repeat CT head, call with any further question concerns.      Dysphagia  Assessment & Plan  Family requesting speech evaluation           Subjective/Objective     Subjective: Patient mostly nonverbal.  He is able to identify his wife and that we are in [place].  He offers no complaints.    Objective: Comfortably laying in bed, NAD.    I/O         02/16 0701  02/17 0700 02/17 0701  02/18 0700 02/18 0701 02/19 0700    P.O. 15 320 240    Total Intake(mL/kg) 15 320 (5.6) 240 (4.2)    Urine (mL/kg/hr)  300 (0.2)     Total Output  300     Net +15 +20 +240           Unmeasured Urine Occurrence 2 x 4 x             Invasive Devices       Peripheral Intravenous Line  Duration             Peripheral IV 02/17/24 Right;Upper;Ventral (anterior) Arm 1 day              Drain  Duration             External Urinary Catheter Medium 1 day                    Physical Exam:  Vitals: Blood pressure 100/62, pulse 94, temperature 97.9 °F (36.6 °C), resp. rate 18, height 6' (1.829 m), weight 57.2 kg (126 lb), SpO2 96%.,Body mass index is 17.09 kg/m².    General appearance: alert, appears stated age, cooperative and no distress  Head: Normocephalic, without obvious abnormality, atraumatic  Eyes: Tracks in room PERRL,  Neck: supple, symmetrical, trachea midline   Lungs: non labored breathing  Heart: regular heart rate  Neurologic:   Mental status: Nearly nonverbal with profound hearing loss.  Is able to identify his wife in room and that we are in [place]  Cranial nerves: grossly intact (Cranial nerves II-XII) Limited due to mental status but no gross abnormality seen  Motor: He does not follow commands but mimics well.  He is able to lift his arms off of the bed as well as his legs.  There is some right upper extremity weakness which is chronic per wife  Reflexes: 2+ and symmetric, no Carmen's or clonus appreciated      Lab Results:  Results from last 7 days   Lab Units 02/18/24  0632 02/17/24  8674  "02/16/24  0329 02/14/24  0430 02/13/24  1443   WBC Thousand/uL 13.56* 9.95 10.40*   < > 5.49   HEMOGLOBIN g/dL 12.9 13.1 12.6   < > 12.9   HEMATOCRIT % 39.9 40.8 37.3   < > 39.3   PLATELETS Thousands/uL 436* 460* 433*   < > 405*   NEUTROS PCT % 87*  --  82*  --  77*   MONOS PCT % 6  --  8  --  11   EOS PCT % 0  --  0  --  0    < > = values in this interval not displayed.     Results from last 7 days   Lab Units 02/18/24  0632 02/17/24  0421 02/16/24  0329 02/15/24  0424 02/14/24  0430   SODIUM mmol/L 137 136 135   < > 139   POTASSIUM mmol/L 4.3 4.4 4.0   < > 4.1   CHLORIDE mmol/L 101 100 100   < > 104   CO2 mmol/L 30 29 26   < > 27   BUN mg/dL 21 15 14   < > 15   CREATININE mg/dL 0.53* 0.55* 0.53*   < > 0.56*   CALCIUM mg/dL 8.8 8.9 8.5   < > 9.2   ALK PHOS U/L  --   --  79  --  87   ALT U/L  --   --  33  --  23   AST U/L  --   --  36  --  42*    < > = values in this interval not displayed.     Results from last 7 days   Lab Units 02/14/24  0430   MAGNESIUM mg/dL 2.2     Results from last 7 days   Lab Units 02/14/24  0430   PHOSPHORUS mg/dL 3.1         No results found for: \"TROPONINT\"  ABG:No results found for: \"PHART\", \"SXH6XTN\", \"PO2ART\", \"NRU1FZN\", \"D3RIWUFY\", \"BEART\", \"SOURCE\"    Imaging Studies: I have personally reviewed pertinent reports.   and I have personally reviewed pertinent films in PACS    CT head w wo contrast    Result Date: 2/18/2024  Impression: Once again identified is a masslike area of low density within the left anterior lateral temporal lobe with 2 punctate hyperdensities noted within. See prior report. Differential considerations continue to include nonhemorrhagic contusion, venous infarction. Neoplastic process is considered less likely given the rapid presentation. Continued surveillance imaging recommended. Slight improvement in the mild peripheral subarachnoid hemorrhage within the cerebral hemispheres with no new hemorrhage. Improvement in the previously identified left parietal " subdural hematoma. Stable small anterior frontal subdural hematoma. Neither demonstrate significant mass effect upon the adjacent brain. Workstation performed: [id number]     CTA head and neck w wo contrast    Result Date: 2/17/2024  Impression: CT brain: Enlarging hypodensity within the left anterior lateral temporal lobe. There are 2 punctate hyperdensities noted within the central aspect of this hypodensity which appears to be sparing the cortex. This is less likely to represent ischemia given the lack of cortical involvement and differentials would include primarily nonhemorrhagic contusion or venous infarction. This is unlikely to represent mass given its lack of presence 4 days ago. Scattered subarachnoid hemorrhage is again present, not significantly changed. There is a mixed density small subdural hematoma within the left hemisphere, acute to early subacute without significant mass effect upon the brain parenchyma. CT angiography: No stenosis, occlusion or thrombosis of the cervical or intracranial vasculature. Dural sinuses and major cortical veins. Examination degraded by artifact from the patient's cochlear implant. Workstation performed: [id number]     CT head wo contrast    Result Date: 2/16/2024  Impression: 1.  Unfortunately significant artifact from cochlear implant degrades image quality in the left temporal lobe where there is possible area of hypodensity concerning for acute/subacute infarct. 2.  New small volume acute subarachnoid hemorrhage in the paramedian right frontal vertex as well as trace new subarachnoid hemorrhage within the deep right sylvian fissure. 3.  Significantly improved recent subarachnoid hemorrhage in the inferior left frontal lobe. Minimal new subarachnoid hemorrhage versus redistribution in the inferior left parietal lobe. 4.  Stable 4 mm thick primarily hypoattenuating subdural collection/hematoma over the left frontoparietal lobes. I personally discussed this study with  GERSON MAGANA on 2/16/2024 1:45 PM. Workstation performed: [id number]     CT spine cervical wo contrast    Result Date: 2/16/2024  Impression: No cervical spine fracture or traumatic malalignment. Stable CT C-spine examination compared to prior Workstation performed: [id number]     CT head wo contrast    Result Date: 2/16/2024  Impression: Small volume suspected acute subarachnoid hemorrhage within the left frontal/opercular region. No acute parenchymal hemorrhage or focal mass effect/midline shift. Stable 3 mm subdural collection along the inferior left frontal convexity. No skull fracture. New nondisplaced fracture at the right zygomatic arch with small overlying soft tissue contusion. Study was marked in EPIC for immediate notification. Workstation performed: [id number]       EKG, Pathology, and Other Studies: I have personally reviewed pertinent reports.          VTE Mechanical Prophylaxis: sequential compression device

## 2024-02-18 NOTE — CASE MANAGEMENT
Case Management Discharge Planning Note    Patient name Thomas Chase  Location [facility] 615/[facility] 615-01 MRN [id number]  : 1940 Date 2024       Current Admission Date: 2024  Current Admission Diagnosis:SDH (subdural hematoma) ([facility])   Patient Active Problem List    Diagnosis Date Noted    UTI (urinary tract infection) 2024    Bilateral lower extremity weakness 2024    Intracranial hemorrhage (HCC) 2024    Abnormal CT scan, lumbar spine 02/15/2024    Severe protein-calorie malnutrition (HCC) 2024    Debility 2024    Pharyngeal myoclonus 2023    Dysphagia 2023    Macular degeneration, age related 2023    Traumatic subdural hemorrhage with loss of consciousness of unspecified duration, initial encounter ([facility]) 2023    Sensorineural hearing loss (SNHL), bilateral 2022    Balance disorder 2022    Abnormal echocardiogram 2022    Right bundle-branch block 2022    Moderate protein-calorie malnutrition (HCC) 2022    Hypotension 2022    Diastolic dysfunction 2022    EKG abnormalities 2022    Constipation 2022    SDH (subdural hematoma) ([facility]) 2022    Primary osteoarthritis of left knee 2022    Hygroma 2022    Right hand pain     Carpal tunnel syndrome on right     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[facility]) 2021    Overweight (BMI 25.0-29.9) 2021    Negative depression screening 2019    Hypercholesterolemia 2018    Borderline hypertension 2018    Hearing loss 2009      LOS (days): 2  Geometric Mean LOS (GMLOS) (days): 2  Days to GMLOS:-0.2     OBJECTIVE:  Risk of Unplanned Readmission Score: 11.7         Current admission status: Inpatient   Preferred Pharmacy:   [id number]  [place] - [address]  [address] [address]  Phone: [phone] Fax: [phone]    Primary Care Provider: Jesenia HALL  RONY Badillo    Primary Insurance: MEDICARE  Secondary Insurance: [facility]    DISCHARGE DETAILS:    Pt being followed by [facility] for potential admission. CM informed them that pt is stable for d/c

## 2024-02-18 NOTE — PROGRESS NOTES
[facility]  Progress Note  Name: Thomas Chase I  MRN: [id number]  Unit/Bed#: [facility] 615-01 I Date of Admission: 2/16/2024   Date of Service: 2/18/2024 I Hospital Day: 2    Assessment/Plan   UTI (urinary tract infection)  Assessment & Plan  -continue rocephin x 3 days  -F/u urine cultures    Intracranial hemorrhage (HCC)  Assessment & Plan  - CT scan of the head on 2/16 reviewed: Small volume left frontal subarachnoid hemorrhage suspected no midline shift   -CTA 12/17- Enlarging hypodensity within the left anterior lateral temporal lobe. There are 2 punctate hyperdensities noted within the central aspect of this hypodensity which appears to be sparing the cortex. This is less likely to represent ischemia given the lack of cortical involvement and differentials would include primarily nonhemorrhagic contusion or venous infarction  -F/u repeat CT head this AM  -ECHO- EF 60%, unremarkable  -Appreciate neurology, neurosurgery recommendations  - Continue neurologic checks: Every 1 hours.  - Reversal agent administered: Patient does not take anti-platelet or anti-coagulant medications. No reversal agent indicated.   - Complete 7 day course of Keppra for seizure prophylaxis  - Chemical DVT prophylaxis: Will restart when cleared by Neurosurgery  - Hold all anticoagulants and anti platelet medications for 2 weeks and/or until cleared by Neurosurgery to resume.  - PT and OT (including cognitive evaluation) evaluation and treatment as indicated.      Debility  Assessment & Plan  - PT/OT  - Geriatric consult    SDH (subdural hematoma) (HCC)  Assessment & Plan  - Patient with history of subdural hematoma s/p craniotomy and SD drain placement x 2 in May of 2022  - Neurosurgery consult and recommendations appreciated     Hypercholesterolemia  Assessment & Plan  - Continue home simvastatin             Bowel Regimen: senokot, colace  VTE Prophylaxis:RX contraindicated due to: ICH   "    Disposition: Pending    Subjective   Chief Complaint: n/a    Subjective: No acute events overnight. Patient remains aphasic, Shakes head no when asked if has pain.     Objective   Vitals:   Temp:  [97.3 °F (36.3 °C)-99.4 °F (37.4 °C)] 97.9 °F (36.6 °C)  HR:  [] 85  Resp:  [16-18] 17  BP: ()/(52-86) 125/75    I/O         02/16 0701  02/17 0700 02/17 0701  02/18 0700    P.O. 15 320    Total Intake(mL/kg) 15 320 (5.6)    Net +15 +320          Unmeasured Urine Occurrence 2 x 4 x             Physical Exam:   General: NAD  HENT: NCAT MMM  Neck: supple, no JVD  CV: nl rate  Lungs: nl wob. No resp distress  ABD: Soft, nontender, nondistended  Extrem: No CCE  Neuro: GCS 12. Aphasic. Intermittently follows commands      Invasive Devices       Peripheral Intravenous Line  Duration             Peripheral IV 02/17/24 Right;Upper;Ventral (anterior) Arm 1 day              Drain  Duration             External Urinary Catheter Medium 1 day                          Lab Results: Results: I have personally reviewed all pertinent laboratory/tests results, BMP/CMP: No results found for: \"SODIUM\", \"K\", \"CL\", \"CO2\", \"ANIONGAP\", \"BUN\", \"CREATININE\", \"GLUCOSE\", \"CALCIUM\", \"AST\", \"ALT\", \"ALKPHOS\", \"PROT\", \"BILITOT\", \"EGFR\", and CBC: No results found for: \"WBC\", \"HGB\", \"HCT\", \"MCV\", \"PLT\", \"ADJUSTEDWBC\", \"RBC\", \"MCH\", \"MCHC\", \"RDW\", \"MPV\", \"NRBC\"  Imaging: I have personally reviewed pertinent films in PACS   Other Studies:        "

## 2024-02-18 NOTE — PLAN OF CARE
Problem: Potential for Falls  Goal: Patient will remain free of falls  Description: INTERVENTIONS:  - Educate patient/family on patient safety including physical limitations  - Instruct patient to call for assistance with activity   - Consult OT/PT to assist with strengthening/mobility   - Keep Call bell within reach  - Keep bed low and locked with side rails adjusted as appropriate  - Keep care items and personal belongings within reach  - Initiate and maintain comfort rounds  - Make Fall Risk Sign visible to staff  - Offer Toileting every 2 Hours, in advance of need  - Initiate/Maintain bed/chair alarm  - Obtain necessary fall risk management equipment: nonskid socks  - Apply yellow socks and bracelet for high fall risk patients  - Consider moving patient to room near nurses station  Outcome: Progressing     Problem: Prexisting or High Potential for Compromised Skin Integrity  Goal: Skin integrity is maintained or improved  Description: INTERVENTIONS:  - Identify patients at risk for skin breakdown  - Assess and monitor skin integrity  - Assess and monitor nutrition and hydration status  - Monitor labs   - Assess for incontinence   - Turn and reposition patient  - Assist with mobility/ambulation  - Relieve pressure over bony prominences  - Avoid friction and shearing  - Provide appropriate hygiene as needed including keeping skin clean and dry  - Evaluate need for skin moisturizer/barrier cream  - Collaborate with interdisciplinary team   - Patient/family teaching  - Consider wound care consult   Outcome: Progressing     Problem: PAIN - ADULT  Goal: Verbalizes/displays adequate comfort level or baseline comfort level  Description: Interventions:  - Encourage patient to monitor pain and request assistance  - Assess pain using appropriate pain scale  - Administer analgesics based on type and severity of pain and evaluate response  - Implement non-pharmacological measures as appropriate and evaluate response  -  Consider cultural and social influences on pain and pain management  - Notify physician/advanced practitioner if interventions unsuccessful or patient reports new pain  Outcome: Progressing     Problem: INFECTION - ADULT  Goal: Absence or prevention of progression during hospitalization  Description: INTERVENTIONS:  - Assess and monitor for signs and symptoms of infection  - Monitor lab/diagnostic results  - Monitor all insertion sites, i.e. indwelling lines, tubes, and drains  - Monitor endotracheal if appropriate and nasal secretions for changes in amount and color  - Hewitt appropriate cooling/warming therapies per order  - Administer medications as ordered  - Instruct and encourage patient and family to use good hand hygiene technique  - Identify and instruct in appropriate isolation precautions for identified infection/condition  Outcome: Progressing  Goal: Absence of fever/infection during neutropenic period  Description: INTERVENTIONS:  - Monitor WBC    Outcome: Progressing     Problem: SAFETY ADULT  Goal: Patient will remain free of falls  Description: INTERVENTIONS:  - Educate patient/family on patient safety including physical limitations  - Instruct patient to call for assistance with activity   - Consult OT/PT to assist with strengthening/mobility   - Keep Call bell within reach  - Keep bed low and locked with side rails adjusted as appropriate  - Keep care items and personal belongings within reach  - Initiate and maintain comfort rounds  - Make Fall Risk Sign visible to staff  - Offer Toileting every 2 Hours, in advance of need  - Initiate/Maintain bed/chair alarm  - Obtain necessary fall risk management equipment: nonskid socks  - Apply yellow socks and bracelet for high fall risk patients  - Consider moving patient to room near nurses station  Outcome: Progressing  Goal: Maintain or return to baseline ADL function  Description: INTERVENTIONS:  -  Assess patient's ability to carry out ADLs; assess  patient's baseline for ADL function and identify physical deficits which impact ability to perform ADLs (bathing, care of mouth/teeth, toileting, grooming, dressing, etc.)  - Assess/evaluate cause of self-care deficits   - Assess range of motion  - Assess patient's mobility; develop plan if impaired  - Assess patient's need for assistive devices and provide as appropriate  - Encourage maximum independence but intervene and supervise when necessary  - Involve family in performance of ADLs  - Assess for home care needs following discharge   - Consider OT consult to assist with ADL evaluation and planning for discharge  - Provide patient education as appropriate  Outcome: Progressing  Goal: Maintains/Returns to pre admission functional level  Description: INTERVENTIONS:  - Perform AM-PAC 6 Click Basic Mobility/ Daily Activity assessment daily.  - Set and communicate daily mobility goal to care team and patient/family/caregiver.   - Collaborate with rehabilitation services on mobility goals if consulted  - Perform Range of Motion  times a day.  - Reposition patient every  hours.  - Dangle patient  times a day  - Stand patient  times a day  - Ambulate patient  times a day  - Out of bed to chair  times a day   - Out of bed for meals times a day  - Out of bed for toileting  - Record patient progress and toleration of activity level   Outcome: Progressing     Problem: DISCHARGE PLANNING  Goal: Discharge to home or other facility with appropriate resources  Description: INTERVENTIONS:  - Identify barriers to discharge w/patient and caregiver  - Arrange for needed discharge resources and transportation as appropriate  - Identify discharge learning needs (meds, wound care, etc.)  - Arrange for interpretive services to assist at discharge as needed  - Refer to Case Management Department for coordinating discharge planning if the patient needs post-hospital services based on physician/advanced practitioner order or complex needs  related to functional status, cognitive ability, or social support system  Outcome: Progressing     Problem: Knowledge Deficit  Goal: Patient/family/caregiver demonstrates understanding of disease process, treatment plan, medications, and discharge instructions  Description: Complete learning assessment and assess knowledge base.  Interventions:  - Provide teaching at level of understanding  - Provide teaching via preferred learning methods  Outcome: Progressing

## 2024-02-18 NOTE — QUICK NOTE
Repeat CTH reviewed, hypodensity within the left anterior temporal lobe appears to be increasing in size but continues to spare the cortex and thus less likely to represent ischemia.  Differential includes nonhemorrhagic contusion or venous infarction, and CTV could be helpful for further evaluation.  However, given the location and the patient's pre-existing SDH, unclear if this would have significant explanatory findings or  at this time.  Unlikely neoplastic given how rapidly it progressed.  Would repeat CT, consider CTV. Patient's TTE showed preserved ejection fraction and normal systolic and diastolic function.  No atrial dilation.  No likely contributory valvular disease. Provided CTh remains stable, would continue to monitor clinical exam.  Would continue infectious workup as appropriate per primary team. DVT ppx per primary team and NSg      Thomas Chase will need f/u in about 6 weeks with neurovascular attending/resident/AP. He will need outpatient CTH for follow up in about 2-3 weeks

## 2024-02-19 ENCOUNTER — APPOINTMENT (INPATIENT)
Dept: RADIOLOGY | Facility: HOSPITAL | Age: 84
DRG: 085 | End: 2024-02-19
Payer: MEDICARE

## 2024-02-19 ENCOUNTER — TELEPHONE (OUTPATIENT)
Dept: NEUROSURGERY | Facility: CLINIC | Age: 84
End: 2024-02-19

## 2024-02-19 ENCOUNTER — TELEPHONE (OUTPATIENT)
Dept: PODIATRY | Facility: CLINIC | Age: 84
End: 2024-02-19

## 2024-02-19 PROCEDURE — G1004 CDSM NDSC: HCPCS

## 2024-02-19 PROCEDURE — 70498 CT ANGIOGRAPHY NECK: CPT

## 2024-02-19 PROCEDURE — 97110 THERAPEUTIC EXERCISES: CPT

## 2024-02-19 PROCEDURE — 97112 NEUROMUSCULAR REEDUCATION: CPT

## 2024-02-19 PROCEDURE — 97530 THERAPEUTIC ACTIVITIES: CPT

## 2024-02-19 PROCEDURE — 99232 SBSQ HOSP IP/OBS MODERATE 35: CPT | Performed by: EMERGENCY MEDICINE

## 2024-02-19 RX ORDER — LEVETIRACETAM 100 MG/ML
500 SOLUTION ORAL EVERY 12 HOURS SCHEDULED
Status: DISCONTINUED | OUTPATIENT
Start: 2024-02-19 | End: 2024-02-20 | Stop reason: HOSPADM

## 2024-02-19 RX ORDER — ENOXAPARIN SODIUM 100 MG/ML
30 INJECTION SUBCUTANEOUS EVERY 12 HOURS SCHEDULED
Status: DISCONTINUED | OUTPATIENT
Start: 2024-02-19 | End: 2024-02-20 | Stop reason: HOSPADM

## 2024-02-19 RX ADMIN — ACETAMINOPHEN 975 MG: 325 TABLET, FILM COATED ORAL at 00:19

## 2024-02-19 RX ADMIN — LEVETIRACETAM 500 MG: 100 SOLUTION ORAL at 20:17

## 2024-02-19 RX ADMIN — ACETAMINOPHEN 975 MG: 325 TABLET, FILM COATED ORAL at 16:48

## 2024-02-19 RX ADMIN — SERTRALINE HYDROCHLORIDE 25 MG: 25 TABLET ORAL at 09:03

## 2024-02-19 RX ADMIN — DOCUSATE SODIUM 100 MG: 100 CAPSULE, LIQUID FILLED ORAL at 09:02

## 2024-02-19 RX ADMIN — IOHEXOL 85 ML: 350 INJECTION, SOLUTION INTRAVENOUS at 18:07

## 2024-02-19 RX ADMIN — Medication 1 TABLET: at 09:02

## 2024-02-19 RX ADMIN — ACETAMINOPHEN 975 MG: 325 TABLET, FILM COATED ORAL at 09:02

## 2024-02-19 RX ADMIN — Medication 6 MG: at 20:17

## 2024-02-19 RX ADMIN — SENNOSIDES 8.6 MG: 8.6 TABLET, FILM COATED ORAL at 20:17

## 2024-02-19 RX ADMIN — PRAVASTATIN SODIUM 40 MG: 40 TABLET ORAL at 16:48

## 2024-02-19 RX ADMIN — DOCUSATE SODIUM 100 MG: 100 CAPSULE, LIQUID FILLED ORAL at 16:49

## 2024-02-19 RX ADMIN — LEVETIRACETAM 500 MG: 100 INJECTION, SOLUTION INTRAVENOUS at 09:03

## 2024-02-19 RX ADMIN — CEFTRIAXONE SODIUM 1000 MG: 10 INJECTION, POWDER, FOR SOLUTION INTRAVENOUS at 00:05

## 2024-02-19 NOTE — CASE MANAGEMENT
Case Management Discharge Planning Note    Patient name Thomas Chase  Location [facility] 615/[facility] 615-01 MRN [id number]  : 1940 Date 2024       Current Admission Date: 2024  Current Admission Diagnosis:SDH (subdural hematoma) ([facility])   Patient Active Problem List    Diagnosis Date Noted    UTI (urinary tract infection) 2024    Bilateral lower extremity weakness 2024    Intracranial hemorrhage (HCC) 2024    Abnormal CT scan, lumbar spine 02/15/2024    Severe protein-calorie malnutrition (HCC) 2024    Debility 2024    Pharyngeal myoclonus 2023    Dysphagia 2023    Macular degeneration, age related 2023    Traumatic subdural hemorrhage with loss of consciousness of unspecified duration, initial encounter ([facility]) 2023    Sensorineural hearing loss (SNHL), bilateral 2022    Balance disorder 2022    Abnormal echocardiogram 2022    Right bundle-branch block 2022    Moderate protein-calorie malnutrition (HCC) 2022    Hypotension 2022    Diastolic dysfunction 2022    EKG abnormalities 2022    Constipation 2022    SDH (subdural hematoma) ([facility]) 2022    Primary osteoarthritis of left knee 2022    Hygroma 2022    Right hand pain     Carpal tunnel syndrome on right     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[facility]) 2021    Overweight (BMI 25.0-29.9) 2021    Negative depression screening 2019    Hypercholesterolemia 2018    Borderline hypertension 2018    Hearing loss 2009      LOS (days): 3  Geometric Mean LOS (GMLOS) (days): 3  Days to GMLOS:-0.3     OBJECTIVE:  Risk of Unplanned Readmission Score: 11.75         Current admission status: Inpatient   Preferred Pharmacy:   [id number]  [place] - [address]  [address] [address]  Phone: [phone] Fax: [phone]    Primary Care Provider: Jesenia HALL  RONY Badillo    Primary Insurance: MEDICARE  Secondary Insurance: [facility]    DISCHARGE DETAILS:    Pt clinically accepted to [facility].  [facility] team want to follow up on pt's elevation of WBC

## 2024-02-19 NOTE — PROGRESS NOTES
[facility]  Progress Note  Name: Thomas Chase I  MRN: [id number]  Unit/Bed#: [facility] 615-01 I Date of Admission: 2/16/2024   Date of Service: 2/19/2024 I Hospital Day: 3    Assessment/Plan   UTI (urinary tract infection)  Assessment & Plan  -continue rocephin x 3 days  -F/u urine cultures    Intracranial hemorrhage (HCC)  Assessment & Plan  -CT scan of the head on 2/16 reviewed: Small volume left frontal subarachnoid hemorrhage suspected no midline shift   -CTA 12/17- Enlarging hypodensity within the left anterior lateral temporal lobe. There are 2 punctate hyperdensities noted within the central aspect of this hypodensity which appears to be sparing the cortex. This is less likely to represent ischemia given the lack of cortical involvement and differentials would include primarily nonhemorrhagic contusion or venous infarction  -F/u repeat CT head this AM  -ECHO- EF 60%, unremarkable  -Continue neurologic checks: Every 1 hours.  -Complete 7 day course of Keppra for seizure prophylaxis  -PT and OT (including cognitive evaluation) evaluation and treatment as indicated.  - Hold all chemical AC and DVT ppx per neurosurgery, no surgical intervention at this time.  - CT venogram per neurology      Debility  Assessment & Plan  - PT/OT  - Geriatric consult    Dysphagia  Assessment & Plan  -Speech therapy is following  -Continue dysphagia 1 pureed and thin liquids.     SDH (subdural hematoma) (HCC)  Assessment & Plan  - Patient with history of subdural hematoma s/p craniotomy and SD drain placement x 2 in May of 2022  - See intracranial hemorrhage A&P    Hypercholesterolemia  Assessment & Plan  - Continue home simvastatin             Bowel Regimen: Senokot  VTE Prophylaxis: Held per neurosurgery     Disposition: not clear for discharge    Subjective     Subjective: Patient not able to verbalize on exam.     Objective   Vitals:   Temp:  [98.3 °F (36.8 °C)-99.9 °F (37.7 °C)] 98.3 °F  (36.8 °C)  HR:  [] 82  Resp:  [16-18] 18  BP: (110-133)/(60-78) 118/60    I/O         02/17 0701  02/18 0700 02/18 0701  02/19 0700 02/19 0701  02/20 0700    P.O. 320 600 480    Total Intake(mL/kg) 320 (5.6) 600 (10.5) 480 (8.4)    Urine (mL/kg/hr) 300 (0.2) 1100 (0.8)     Total Output 300 1100     Net +20 -500 +480           Unmeasured Urine Occurrence 4 x 2 x 1 x             Physical Exam:   General: NAD  Neuro: Sleeping on exam. Able to wake up to sternal rub. Aphasic, intermittently follows commands.  HEENT: Normocephalic, atraumatic, moist mucus membranes  CV: regular rate  Lung: normal work of breathing  Abd: Soft, nontender, nonrigid, without guarding or rebound.  MSK: moves extremities spontaneously.      Invasive Devices       Peripheral Intravenous Line  Duration             Peripheral IV 02/17/24 Right;Upper;Ventral (anterior) Arm 2 days              Drain  Duration             External Urinary Catheter Medium 2 days                          Lab Results: Results: I have personally reviewed all pertinent laboratory/tests results  Imaging: I have personally reviewed pertinent reports.     Other Studies: None

## 2024-02-19 NOTE — ASSESSMENT & PLAN NOTE
- Patient with history of subdural hematoma s/p craniotomy and SD drain placement x 2 in May of 2022  - See intracranial hemorrhage A&P

## 2024-02-19 NOTE — PHYSICAL THERAPY NOTE
Physical Therapy Progress Note     02/19/24 1625   PT Last Visit   PT Visit Date 02/19/24   Note Type   Note Type Treatment   Pain Assessment   Pain Assessment Tool 0-10   Pain Score No Pain   Restrictions/Precautions   Other Precautions Cognitive;Chair Alarm;Bed Alarm;Fall Risk;Hard of hearing  (Alarm active post session.)   Subjective   Subjective Pt. minimally verbal.   Transfers   Sit to Stand 3  Moderate assistance   Additional items Assist x 1;Increased time required   Stand to Sit 3  Moderate assistance   Additional items Assist x 1;Increased time required   Ambulation/Elevation   Gait pattern Excessively slow;Step to;Short stride;Inconsistent savana;Decreased foot clearance   Gait Assistance 4  Minimal assist   Additional items Assist x 1;Tactile cues   Assistive Device Rolling walker   Distance 8 feet, 4 feet x 2.   Balance   Static Sitting Fair +   Dynamic Sitting Fair   Static Standing Fair -   Dynamic Standing Poor +   Ambulatory Poor +   Activity Tolerance   Activity Tolerance Patient tolerated treatment well;Patient limited by fatigue   Exercises   TKR Sitting;Bilateral;AROM;10 reps  (x2 sets.)   Assessment   Prognosis Fair   Problem List Decreased strength;Decreased endurance;Impaired balance;Decreased mobility;Decreased cognition;Decreased safety awareness   Assessment The patient was able to ambulate a few feet before fatiguing today. He was instructed in therapeutic exercise which he completed with tactile instruction. Increased time was necessary due to having to write all instructions down. Will continue to follow in order to safely progress the patient's mobility and safety.   Barriers to Discharge Inaccessible home environment;Decreased caregiver support   Goals   Patient Goals Pt. did not express.   STG Expiration Date 02/27/24   PT Treatment Day 1   Plan   Treatment/Interventions LE strengthening/ROM;Elevations;Functional transfer training;Therapeutic exercise;Endurance training;Cognitive  reorientation;Patient/family training;Bed mobility;Gait training   Progress Progressing toward goals   PT Frequency 3-5x/wk   Discharge Recommendation   Rehab Resource Intensity Level, PT II (Moderate Resource Intensity)   Equipment Recommended Walker   Walker Package Recommended Wheeled walker   AM-PAC Basic Mobility Inpatient   Turning in Flat Bed Without Bedrails 3   Lying on Back to Sitting on Edge of Flat Bed Without Bedrails 2   Moving Bed to Chair 2   Standing Up From Chair Using Arms 2   Walk in Room 3   Climb 3-5 Stairs With Railing 2   Basic Mobility Inpatient Raw Score 14   Basic Mobility Standardized Score 35.55   Highest Level Of Mobility   JH-HLM Goal 4: Move to chair/commode   JH-HLM Achieved 6: Walk 10 steps or more         An AM-PAC Basic Mobility raw score less than 16 suggests the patient may benefit from discharge to post-acute rehab services.    Arjun Thornton, PTA

## 2024-02-19 NOTE — TELEPHONE ENCOUNTER
2/23/24 - PT STILL IN [facility]    2/21/24 - PT IN [facility]    2/19/24 - PT IN [facility]  3/6/24 2 [facility]K HFU W/AP W/CTH     [facility]  Patient needs 2-week hospital follow-up with AP with CT head

## 2024-02-19 NOTE — TELEPHONE ENCOUNTER
Left message for patient to call back to rs appt to later in day, same day due to dr schedule change.

## 2024-02-19 NOTE — PROGRESS NOTES
PHYSICAL MEDICINE AND REHABILITATION   PREADMISSION ASSESSMENT     Projected IGC and Rehabilitation Diagnoses:  Impairment of mobility, safety and Activities of Daily Living (ADLs) due to Stroke:  01.4  No paresis  Etiologic: small volume acute subarachnoid hemorrhage in the paramedian right frontal vertex as well as trace new subarachnoid hemorrhage within the deep right sylvian fissure.   Date of Onset: 2/13/24   Date of surgery: N/A    PATIENT INFORMATION  Name: Thomas Chase Phone #: [phone] (home)   Address: [address] [place] [address] [address] [id number]  YOB: 1940 Age: 83 y.o. #   Marital Status: /Civil Union  Ethnicity:   Employment Status: retired  Extended Emergency Contact Information  Primary Emergency Contact: Allegra Chase  Mobile Phone: [phone]  Relation: Daughter  Secondary Emergency Contact: BinGina lopez  Address: [address] [place] [address] [address] [address] [facility]  Home Phone: [phone]  Mobile Phone: [phone]  Relation: Spouse  Advance Directive: Level 3 - DNAR and DNI (no advanced directives on file)    INSURANCE/COVERAGE:     Primary Payor: MEDICARE / Plan: MEDICARE A AND B / Product Type: Medicare A & B Fee for Service /   Secondary Payer: [employer]  Member ID: [id number]    Payer Contact:  Payer Contact:   Contact Phone:  Contact Phone: [phone]        MEDICARE #: [id number]   Medicare Days:  60/30/60  Medical Record #: [id number]    REFERRAL SOURCE:   Referring provider: Kip Alvarado MD  Referring facility: [facility]  Room[facility]  PCP: RONY Diaz PCP phone number: [phone]    MEDICAL INFORMATION  HPI: Thomas Chase is a 83 y.o. male patient who originally presented to the [place] ED on 2/13/2024 due to debility.  Patient had an unwitnessed fall where he was presumed to be attempting to go to the bathroom. Patient was  "found by nursing staff after hearing a loud thud was laying on the floor.  Patient was transferred to the ER for a rapid response was called to head and cervical spine CT was ordered. CT image revealed a new left-sided intraparenchymal hemorrhage.  Patient was transferred to [place] for Trauma services.  Neurology consulted.  Repeat CTH reviewed, hypodensity within the left anterior temporal lobe appears to be increasing in size but continues to spare the cortex and thus less likely to represent ischemia.  Differential includes nonhemorrhagic contusion or venous infarction, and CTV could be helpful for further evaluation.  However, given the location and the patient's pre-existing SDH, unclear if this would have significant explanatory findings or  at this time.  Unlikely neoplastic given how rapidly it progressed.  Would repeat CT, consider CTV. Patient's TTE showed preserved ejection fraction and normal systolic and diastolic function.  No atrial dilation.  No likely contributory valvular disease. Provided CTh remains stable, would continue to monitor clinical exam.  Would continue infectious workup as appropriate per primary team. DVT ppx per primary team and Nsg.  Neurosurgery consulted.  No surgical interventions.  On 2/20, as per note by Neurosurgery -  \"I spoke with Allegra Chase, daughter of the patient, per her request. In doing so I reviewed the patient's notes and serial CT scan imaging including the CTA and CTV.  Jessica is a patient of mine.  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  This process will declare itself definitively over the next couple of weeks.  I do believe that the balance of risk versus benefit strongly favors pharmacologic DVT prophylaxis.  Rehabilitation with physical therapy, and speech therapy will give him his best possibility of improvement.    He has been " deemed hemodynamically stable at this time.  PT/OT therapies were consulted and continue to follow patient at this time and are recommending inpatient acute rehab when medically stable. All involved medical disciplines feel/agree patient is medically ready for discharge at this time. Inpatient acute rehabilitation physician was consulted. Upon review of patient's case and correspondence with PT/OT therapy services, [facility] physician feels he will benefit and is a good candidate and appropriate for inpatient acute rehab at this time. He has demonstrated the willingness, desire and tolerance to participate in the required 3 hours or more of therapies per day.     Past Medical History:   Past Surgical History:   Allergies:     Past Medical History:   Diagnosis Date    Arthritis     Encounter for general adult medical examination without abnormal findings 03/20/2019    Fall 11/03/2022    Hyperlipidemia     Macular degeneration, wet (HCC)     Urinary retention 06/02/2022    Past Surgical History:   Procedure Laterality Date    BRAIN HEMATOMA EVACUATION Left 05/28/2022    Procedure: left CRANIOTOMY FOR SUBDURAL HEMATOMA;  Surgeon: Chris Watts MD;  [facility];  Service: Neurosurgery    CARPAL TUNNEL RELEASE      HERNIA REPAIR Right     inguinal    IR CEREBRAL ANGIOGRAPHY / INTERVENTION  06/02/2022    ME COCHLEAR DEVICE IMPLANTATION W/WO MASTOIDECTOMY Left 1/17/2023    Procedure: LEFT COCHLEAR IMPLANTATION WITH MASTOIDECTOMY AND FACIAL NERVE MONITORING WITH AUDIOMETRIC TESTING OF THE IMPLANT;  Surgeon: Susie Tuttle MD;  Location[facility];  Service: ENT    ME NEUROPLASTY &/TRANSPOS MEDIAN NRV CARPAL TUNNE Right 09/20/2021    Procedure: RELEASE CARPAL TUNNEL;  Surgeon: Bruno Segovia MD;  Location: [facility];  Service: Orthopedics     No Known Allergies      Medical/functional conditions requiring inpatient rehabilitation: Impaired balance, ambulation and mobility and ADL self-care    Risk for  medical/clinical complications: Risk for Falls, uncontrolled pain, DVT/PE, skin breakdown, infection, anemia, hypo/hypertension    Comorbidities:  Hypercholesterolemia, SDH, Hearing loss, Moderate protein-calorie malnutrition, Dysphagia    CURRENT VITAL SIGNS:   Temp:  [96 °F (35.6 °C)-98.4 °F (36.9 °C)] 98.4 °F (36.9 °C)  HR:  [79-87] 83  Resp:  [17-18] 18  BP: ()/(62-82) 94/62   Intake/Output Summary (Last 24 hours) at 2/20/2024 1318  Last data filed at 2/20/2024 1308  Gross per 24 hour   Intake 480 ml   Output --   Net 480 ml        LABORATORY RESULTS:      Lab Results   Component Value Date    HGB 13.0 02/20/2024    HGB 15.3 05/11/2018    HCT 40.5 02/20/2024    HCT 46.2 05/11/2018    WBC 6.88 02/20/2024    WBC 5.1 05/11/2018     Lab Results   Component Value Date    BUN 21 02/18/2024    BUN 17 05/11/2018     05/11/2018    K 4.3 02/18/2024    K 4.7 05/11/2018     02/18/2024     05/11/2018    CREATININE 0.53 (L) 02/18/2024    CREATININE 0.84 05/11/2018     Lab Results   Component Value Date    PROTIME 13.0 06/02/2022    INR 1.02 06/02/2022        DIAGNOSTIC STUDIES:  CT head w wo contrast    Result Date: 2/18/2024  Impression: Once again identified is a masslike area of low density within the left anterior lateral temporal lobe with 2 punctate hyperdensities noted within. See prior report. Differential considerations continue to include nonhemorrhagic contusion, venous infarction. Neoplastic process is considered less likely given the rapid presentation. Continued surveillance imaging recommended. Slight improvement in the mild peripheral subarachnoid hemorrhage within the cerebral hemispheres with no new hemorrhage. Improvement in the previously identified left parietal subdural hematoma. Stable small anterior frontal subdural hematoma. Neither demonstrate significant mass effect upon the adjacent brain. Workstation performed: [id number]     CTA head and neck w wo contrast    Result Date:  2/17/2024  Impression: CT brain: Enlarging hypodensity within the left anterior lateral temporal lobe. There are 2 punctate hyperdensities noted within the central aspect of this hypodensity which appears to be sparing the cortex. This is less likely to represent ischemia given the lack of cortical involvement and differentials would include primarily nonhemorrhagic contusion or venous infarction. This is unlikely to represent mass given its lack of presence 4 days ago. Scattered subarachnoid hemorrhage is again present, not significantly changed. There is a mixed density small subdural hematoma within the left hemisphere, acute to early subacute without significant mass effect upon the brain parenchyma. CT angiography: No stenosis, occlusion or thrombosis of the cervical or intracranial vasculature. Dural sinuses and major cortical veins. Examination degraded by artifact from the patient's cochlear implant. Workstation performed: [id number]     CT head wo contrast    Result Date: 2/16/2024  Impression: 1.  Unfortunately significant artifact from cochlear implant degrades image quality in the left temporal lobe where there is possible area of hypodensity concerning for acute/subacute infarct. 2.  New small volume acute subarachnoid hemorrhage in the paramedian right frontal vertex as well as trace new subarachnoid hemorrhage within the deep right sylvian fissure. 3.  Significantly improved recent subarachnoid hemorrhage in the inferior left frontal lobe. Minimal new subarachnoid hemorrhage versus redistribution in the inferior left parietal lobe. 4.  Stable 4 mm thick primarily hypoattenuating subdural collection/hematoma over the left frontoparietal lobes. I personally discussed this study with GERSON MAGANA on 2/16/2024 1:45 PM. Workstation performed: [id number]     CT spine cervical wo contrast    Result Date: 2/16/2024  Impression: No cervical spine fracture or traumatic malalignment. Stable CT C-spine  examination compared to prior Workstation performed: [id number]     CT head wo contrast    Result Date: 2/16/2024  Impression: Small volume suspected acute subarachnoid hemorrhage within the left frontal/opercular region. No acute parenchymal hemorrhage or focal mass effect/midline shift. Stable 3 mm subdural collection along the inferior left frontal convexity. No skull fracture. New nondisplaced fracture at the right zygomatic arch with small overlying soft tissue contusion. Study was marked in EPIC for immediate notification. Workstation performed: [id number]       PRECAUTIONS/SPECIAL NEEDS:  Tobacco:   Social History     Tobacco Use   Smoking Status Former   Smokeless Tobacco Never   Tobacco Comments    Smoked as a teenager   , Alcohol:    Social History     Substance and Sexual Activity   Alcohol Use Yes    Alcohol/week: 3.0 standard drinks of alcohol    Types: 3 Cans of beer per week   , Anticoagulation:   Lovenox , Edema Management, Safety Concerns, Pain Management, Bladder Incontinence: # of accidents multiple, Dietary Restrictions: Dysphagia 1 - pureed, thin liquid, nutrition supplements Ensure Max - vanilla with breakfast, lunch and dinner, Hard of Hearing, and Language Preference: English    MEDICATIONS:     Current Facility-Administered Medications:     acetaminophen (TYLENOL) tablet 975 mg, 975 mg, Oral, Q8H, Shannen Dominguez PA-C, 975 mg at 02/20/24 0928    docusate sodium (COLACE) capsule 100 mg, 100 mg, Oral, BID, Virgil Jimenez MD, 100 mg at 02/20/24 0928    enoxaparin (LOVENOX) subcutaneous injection 30 mg, 30 mg, Subcutaneous, Q12H [facility], Constantine Quan MD, 30 mg at 02/20/24 0928    levETIRAcetam (KEPPRA) oral solution 500 mg, 500 mg, Oral, Q12H [facility], Shannen Dominguez PA-C, 500 mg at 02/20/24 0928    loratadine (CLARITIN) tablet 10 mg, 10 mg, Oral, Daily, Virgil Jimenez MD, 10 mg at 02/18/24 0814    melatonin tablet 6 mg, 6 mg, Oral, HS, Virgil Jimenez MD, 6 mg at 02/19/24 2017     multivitamin-minerals (CENTRUM) tablet 1 tablet, 1 tablet, Oral, Daily, Virgil Jimenez MD, 1 tablet at 02/20/24 0928    ondansetron (ZOFRAN) injection 4 mg, 4 mg, Intravenous, Q6H PRN, Virgil Jimenez MD    pravastatin (PRAVACHOL) tablet 40 mg, 40 mg, Oral, Daily With Dinner, Virgil Jimenez MD, 40 mg at 02/19/24 1648    senna (SENOKOT) tablet 8.6 mg, 1 tablet, Oral, HS, Virgil Jimenez MD, 8.6 mg at 02/19/24 2017    sertraline (ZOLOFT) tablet 25 mg, 25 mg, Oral, Daily, Virgil Jimenez MD, 25 mg at 02/20/24 0928    SKIN INTEGRITY:   no rashes, no erythema, no peripheral edema    PRIOR LEVEL OF FUNCTION:  He lives in a(n) single family home  Thomas Chase is  and lives with their spouse.  Self Care: Needed some help, Indoor Mobility: Independent, Stairs (in/outdoor): Independent, and Cognition: Needed some help    FALLS IN THE LAST 6 MONTHS: 1-4    HOME ENVIRONMENT:  The living area: can live on one level  There is 1 step to enter the home.    The patient will have 24 hour supervision/physical assistance available upon discharge.    PREVIOUS DME:  Equipment in home (previous DME): Shower Chair, Single Point Cane, and Quad cane    FUNCTIONAL STATUS:   Physical Therapy Occupational Therapy Speech Therapy    As per PT 2/19/24:    Subjective   Subjective Pt. minimally verbal.   Transfers   Sit to Stand 3  Moderate assistance   Additional items Assist x 1;Increased time required   Stand to Sit 3  Moderate assistance   Additional items Assist x 1;Increased time required   Ambulation/Elevation   Gait pattern Excessively slow;Step to;Short stride;Inconsistent savana;Decreased foot clearance   Gait Assistance 4  Minimal assist   Additional items Assist x 1;Tactile cues   Assistive Device Rolling walker   Distance 8 feet, 4 feet x 2.   Balance   Static Sitting Fair +   Dynamic Sitting Fair   Static Standing Fair -   Dynamic Standing Poor +   Ambulatory Poor +   Activity Tolerance   Activity Tolerance Patient tolerated  treatment well;Patient limited by fatigue   Exercises   TKR Sitting;Bilateral;AROM;10 reps  (x2 sets.)   Assessment   Prognosis Fair   Problem List Decreased strength;Decreased endurance;Impaired balance;Decreased mobility;Decreased cognition;Decreased safety awareness   Assessment The patient was able to ambulate a few feet before fatiguing today. He was instructed in therapeutic exercise which he completed with tactile instruction. Increased time was necessary due to having to write all instructions down. Will continue to follow in order to safely progress the patient's mobility and safety.     As per OT 2/20/24:    ADL   Eating Assistance 3  Moderate Assistance   Eating Deficit Setup;Verbal cueing;Supervision/safety;Increased time to complete;Bringing food to mouth assist;Scoop assist   UB Dressing Assistance 3  Moderate Assistance   LB Dressing Assistance 2  Maximal Assistance   Toileting Assistance  2  Maximal Assistance   Bed Mobility   Supine to Sit 4  Minimal assistance   Additional items Assist x 1;Increased time required;Verbal cues;LE management   Transfers   Sit to Stand 4  Minimal assistance   Additional items Assist x 1;Increased time required;Verbal cues   Stand to Sit 4  Minimal assistance   Additional items Assist x 1;Increased time required;Verbal cues   Functional Mobility   Functional Mobility 4  Minimal assistance   Additional items Rolling walker   Cognition   Overall Cognitive Status Impaired   Arousal/Participation Responsive;Cooperative   Attention Attends with cues to redirect   Orientation Level Oriented to person;Oriented to place   Memory Decreased recall of precautions;Decreased recall of recent events;Decreased short term memory   Following Commands Follows one step commands with increased time or repetition   Activity Tolerance   Activity Tolerance Patient limited by fatigue   Assessment   Assessment Patient seen for OT treatment session with focus on functional transfers/mobility,  ADLs, and overall activity tolerance.  Patient pleasant and cooperative, agreeable to participate however presents with flat affect.  Use of whiteboard to assist with communication 2' significantly hard of hearing.  Patient continues to require mod a for upper body dressing and max a for lower body dressing.  Improvement noted in supine to sit transfers, sit to stand transfers and functional mobility to min a with use of rolling walker.  Patient required MOD A for feeding while sitting up in bedside chair including scooping food and bringing food to mouth.  Patient required min a to initiate beverage management, able to bring cup with straw to mouth using bilateral upper extremities for steadying.  Patient appreciative of therapist at end of session.  Left out of bed with all needs in reach + chair alarm activated.  Patient is progressing however remains significantly limited.  Continue to recommend level 2 resources upon discharge.  Will continue to follow to address the initially established OT goals.     As per SLP 2/16/24:    Speech-Language Pathology Bedside Swallow Evaluation     Patient Name: Thomas Chase     Today's Date: 2/16/2024                Evaluation was completed and documented by SLP  Laila Freeman.   I have reviewed the note performed and documented by the . I agree with the findings and plan of management for this patient's speech/swallowing needs. I was present and participated in the evaluation/treatment of this patient.     Bailee Nur M.S.CCC-SLP     Pt's wife and son were present in room during assessment. Spouse reported pt has had some dysphagia for several years, and she prepares soft foods for him at home. He does have a chronic cough with intake, however recent CXR was clear. [facility] from [facility] were reviewed. Suspect some risk for trace aspiration across all consistencies. Based on research re: negative outcomes of aspirating  thickened liquids, consider resuming thin liquids with use of safe swallow strategies and frequent/thorough oral care to minimize risks. Family in agreement. ST will f/u.             Problem List  Active Problems:    Hypercholesterolemia    SDH (subdural hematoma) (HCC)    Debility    Intracranial hemorrhage (HCC)     Past Medical History  Medical History        Past Medical History:   Diagnosis Date    Arthritis      Encounter for general adult medical examination without abnormal findings 03/20/2019    Fall 11/03/2022    Hyperlipidemia      Macular degeneration, wet (HCC)      Urinary retention 06/02/2022         Past Surgical History  Surgical History         Past Surgical History:   Procedure Laterality Date    BRAIN HEMATOMA EVACUATION Left 05/28/2022     Procedure: left CRANIOTOMY FOR SUBDURAL HEMATOMA;  Surgeon: Chris Watts MD;  [facility];  Service: Neurosurgery    CARPAL TUNNEL RELEASE        HERNIA REPAIR Right       inguinal    IR CEREBRAL ANGIOGRAPHY / INTERVENTION   06/02/2022    MA COCHLEAR DEVICE IMPLANTATION W/WO MASTOIDECTOMY Left 1/17/2023     Procedure: LEFT COCHLEAR IMPLANTATION WITH MASTOIDECTOMY AND FACIAL NERVE MONITORING WITH AUDIOMETRIC TESTING OF THE IMPLANT;  Surgeon: Susie Tuttle MD;  [facility];  Service: ENT    MA NEUROPLASTY &/TRANSPOS MEDIAN NRV CARPAL TUNNE Right 09/20/2021     Procedure: RELEASE CARPAL TUNNEL;  Surgeon: Bruno Segovia MD;  Location: [facility];  Service: Orthopedics            Summary  Pt presented with s/s suggestive of mild oral and suspected mod-severe pharyngeal dysphagia. Symptoms or concerns included reduced but functional bolus formation and transfer, suspected anterior loss/spillage of thin liquids,  suspected pharyngeal swallow delay, suspected pharyngeal residue, and multiple swallows. S/s seen today appear to be consistent with findings of previous MBS which revealed retention in the valleculae w/ all  textures/consistencies. In addition, pt's wife reports pt regularly coughs w/ meals (prior to hospitalization) and at times food is expelled from the oral cavity. Wife reported no hx of pneumonia. Suspected pt is able to clear majority of bolus from valleculae. Recent chest XR (2/14/24) showed no acute cardiopulmonary disease.      Risk/s for Aspiration: high     Recommended Diet: puree/level 1 diet and thin liquids   Recommended Form of Meds: crushed with puree   Aspiration precautions and swallowing strategies: only feed when fully alert, slow rate of feeding, small bites/sips, and no straws  Other Recommendations: Continue frequent oral care     Current Medical Status per H&P 2/16/24  Thomas Chase is a 83 y.o. male who presents with Fall.  Patient was originally admitted to [facility] on 2/13 for debility.  Patient had an unwitnessed fall.  Patient was found by nursing staff patient lying on the floor.  Rapid response was called and CT head and CT C-spine was ordered.  CT head showed left-sided acute subarachnoid hemorrhage. Hx limited due to patient's baseline aphasia. Patient was transferred to [facility] for trauma evaluation     Special Studies:  CT head wo contrast 2/16/24  IMPRESSION:  Small volume suspected acute subarachnoid hemorrhage within the left frontal/opercular region.  No acute parenchymal hemorrhage or focal mass effect/midline shift.  Stable 3 mm subdural collection along the inferior left frontal convexity.  No skull fracture. New nondisplaced fracture at the right zygomatic arch with small overlying soft tissue contusion.  XR chest 1 view portable 2/14/24  IMPRESSION:  No acute cardiopulmonary disease.     Previous MBS 2/14/24 at [facility]  Summary:  Images are on PACS for review.   Oral Phase : Pt presented with mild oral dysphagia. Mastication was mildly prolonged however functional. Bolus control, formation, and transfer were WFL/WNL.   Pharyngeal Phase: Pt presented with  mod/severe pharyngeal dysphagia. Swallow initiation was delayed resulting in spill to the valleculae with all  trials and min spill to the pyriforms with thin liquids. Hyoid laryngeal elevation and excursion was WFL/WNL. Epiglottic inversion was incomplete.  Tip of epiglottis did not fully invert. Pharyngeal constriction was reduced. Pt had mild to mod vallecular retention with thin liquid trials. Provided with ntl/htl and all solid trials pt had mod to MAX vallecular rentention. Residue from vallecular rentention resulted in some spilling over epiglottis to trace anterior commissure. Trace to mild pyriform retention was present with all consistencies. Trace aspiration occurred with straw sips and Phillip with cup sips of thin intermittently. With trials of Nectar/honey thick liquids trace/mild aspiration occurred and increased epiglottic undercoat. Pt had no sensation of aspiration events. Increased difficulty following commands to utilize safe swallow strategies due to cognitive status and [place].  Cued cough however ineffective in reducing aspiration. As study progressed pt presented with increased wet vocal quality. Pt independently will initiates multiple swallows (3-4) to clear vallecular residue however are min successful   Per Esophageal screen   Limited screen as pt oropharyngeal dysphagia. Appeared to clear liquids/solids adequately. No residue at completion of study.    Observations:  Pt does have cochlear implant however continues with [place]. Benefits from simple written directions, however will intermittently follow due to cognitive status and [place].     Recommendations:  Diet: Dysphagia 1 Pureed   Liquids: Thin   Meds: Crushed w/ puree  Strategies: SLOW rate, SMALL SIPS, alternate liquids with solids, swallow prior to additonal po, double swallow, effortful swallow. Frequent oral care  Upright position  F/u ST tx: yes  Therapy Prognosis: guarded  Prognosis considerations:age, medical status, cognitive  status  Full Supervision  Aspiration Precautions  Reflux Precautions  Consider consult with: Dietary  Results reviewed with: pt, nursing, family, physician, dietician  Aspiration precautions posted.     Social/Education/Vocational Hx:  Pt lives with family     Swallow Information   Current Risks for Dysphagia & Aspiration: CVA, known history of dysphagia, and AMS  Current Symptoms/Concerns: coughing with po and wet vocal quality  Current Diet: NPO   Baseline Diet: regular diet and thin liquids, however, wife tends to give pt a softer diet        Baseline Assessment   Behavior/Cognition: alert  Speech/Language Status: able to follow commands inconsistently and limited verbal output  Patient Positioning: upright in bed  Pain Status/Interventions/Response to Interventions: No report of or nonverbal indications of pain.    Swallow Mechanism Exam  Facial: symmetrical  Labial: decreased coordination  Lingual: WFL  Velum: symmetrical  Mandible: adequate ROM  Dentition: adequate  Vocal quality:aphonic   Volitional Cough: unable to initiate volitional cough (pt has reflexive cough)  Respiratory Status: on RA     Consistencies Assessed and Performance   Consistencies Administered: ice chips, thin liquids, and puree  Materials administered included ice, water via tsp, and apple sauce    Oral Stage: minimal  Bolus formation and transfer were reduced but functional with no significant oral residue noted. No overt s/s reduced oral control.    Pharyngeal Stage: moderate  Swallow Mechanics:  Swallowing initiation appeared delayed w/ multiple swallows. Laryngeal rise was palpated and judged to be adequate. Coughing, throat clearing, and change in vocal quality were noted for thin liquids. For all textures/consistencies, suspected retention in the valleculae w/ lack of epiglottic inversion as seen in previous MBS.     Esophageal Concerns: none reported     Summary and Recommendations (see above)     Results Reviewed with: patient,  RN, and family      Treatment Recommended: Yes      Frequency of treatment: 2-3x/wk     Dysphagia LTG  -Patient will demonstrate safe and effective oral intake (without overt s/s significant oral/pharyngeal dysphagia including s/s penetration or aspiration) for the highest appropriate diet level.      Short Term Goals:  -Pt will tolerate Dysphagia 1/pureed diet and thin liquid with no significant s/s oral or pharyngeal dysphagia across 1-3 diagnostic session/s     CARE SCORES:  Self Care:  Eatin: Partial/moderate assistance  Oral hygiene: 09: Not applicable  Toilet hygiene: 02: Substantial/maximal assistance  Shower/bathing self: 09: Not applicable  Upper body dressin: Partial/moderate assistance  Lower body dressin: Substantial/maximal assistance  Putting on/taking off footwear: 02: Substantial/maximal assistance  Transfers:  Roll left and right: 09: Not applicable  Sit to lyin: Not applicable  Lying to sitting on side of bed: 09: Not applicable  Sit to stand: 03: Partial/moderate assistance  Chair/bed to chair transfer: 03: Partial/moderate assistance  Toilet transfer: 03: Partial/moderate assistance  Mobility:  Walk 10 ft: 09: Not applicable  Walk 50 ft with two turns: 09: Not applicable  Walk 150ft: 09: Not applicable    CURRENT GAP IN FUNCTION  Prior to Admission: Functional Status: Patient was not independent with mobility/ambulation, transfers, ADL's, IADL's.    Expected functional outcomes: It is expected that with skilled acute rehabilitation services the patient will progress to Supervision for self care and Supervision for mobility     Estimated length of stay: 10 to 14 days    Anticipated Post-Discharge Disposition/Treatment  Disposition: Return to previous home/apartment.  Outpatient Services: Physical Therapy (PT) and Occupational Therapy (OT) Speech Therapy (SLP)    BARRIERS TO DISCHARGE  Lovenox, Weakness, Diminished cognition/Mentation change, Balance Difficulty, Fatigue,  Financial Resources, Equipment Needs, and Resource Availability    INTERVENTIONS FOR DISCHARGE  Adaptive equipment, Patient/Family/Caregiver Education, Financial Assistance, Arrange DME needs, Medication Changes as per MD recommendations, Therapy exercises, Center of balance support , and Energy conservation education     REQUIRED THERAPY:  Patient will require PT, OT and ST 60 minutes each per day, five days per week to achieve rehab goals.     REQUIRED FUNCTIONAL AND MEDICAL MANAGEMENT FOR INPATIENT REHABILITATION:  Pain Management: Overall pain is well controlled, Deep Vein Thrombosis (DVT) Prophylaxis:  SCD's while in bed and Lovenox,    Nursing education and bowel/bladder management, internal medicine to manage/monitor medical conditions, PM&R to maximize function and provide medical oversight, PT/OT intervention, patient/family education/training, and any consults as needed.     RECOMMENDED LEVEL OF CARE: Thomas Chase is a 83 y.o. male patient who originally presented to the [facility] ED on 2/13/2024 due to debility.  Patient had an unwitnessed fall where he was presumed to be attempting to go to the bathroom. Patient was found by nursing staff after hearing a loud thud was laying on the floor.  Patient was transferred to the ER for a rapid response was called to head and cervical spine CT was ordered. CT image revealed a new left-sided intraparenchymal hemorrhage.  Patient was transferred to [facility] for Trauma services.  Neurology consulted.  Repeat CTH reviewed, hypodensity within the left anterior temporal lobe appears to be increasing in size but continues to spare the cortex and thus less likely to represent ischemia.  Differential includes nonhemorrhagic contusion or venous infarction, and CTV could be helpful for further evaluation.  However, given the location and the patient's pre-existing SDH, unclear if this would have significant explanatory findings or  at this time.  Unlikely  "neoplastic given how rapidly it progressed.  Would repeat CT, consider CTV. Patient's TTE showed preserved ejection fraction and normal systolic and diastolic function.  No atrial dilation.  No likely contributory valvular disease. Provided CTh remains stable, would continue to monitor clinical exam.  Would continue infectious workup as appropriate per primary team. DVT ppx per primary team and Nsg.  Neurosurgery consulted.  No surgical interventions.  On 2/20, as per note by Neurosurgery -  \"I spoke with Allegra Chase, daughter of the patient, per her request. In doing so I reviewed the patient's notes and serial CT scan imaging including the CTA and CTV.  Jessica is a patient of mine.  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  This process will declare itself definitively over the next couple of weeks.  I do believe that the balance of risk versus benefit strongly favors pharmacologic DVT prophylaxis.  Rehabilitation with physical therapy, and speech therapy will give him his best possibility of improvement.\"    Prior to admission / hospital stay patient was not independent with all ADLs, functional mobility and IADLs. Currently with PT therapies: Mod A transfers and Min A with ambulation with RW.  Currently with OT therapies: Mod A eating and UB dressing, Max A LB dressing and toileting.   Nursing is being recommended for medication distribution and management, bowel and bladder management and educational purposes, internal medicine to continue to monitor and manage medical conditions, PM&R to maximize  function and provide medical oversight, and inpatient rehab to maximize self-care, mobility, strength and endurance upon discharge to home.  "

## 2024-02-19 NOTE — ASSESSMENT & PLAN NOTE
-Speech therapy is following  -Continue dysphagia 1 pureed and thin liquids.    Anemia  received iron transfusion  Arthritis    Atrial fibrillation  on tikosyn and xarelto  BPH (Benign Prostatic Hyperplasia)    CAD (coronary artery disease)  s/p PCI LCx 2013 and mLAD 8/2017  Cardiomyopathy    HFrEF (heart failure with reduced ejection fraction)    HTN (hypertension)    Hypercholesteremia    Obesity    PAD (peripheral artery disease)  s/p PCI LLE stent 2014

## 2024-02-19 NOTE — PROGRESS NOTES
Patient:    MRN:  [id number]    Aidin Request ID:  [id number]    Level of care reserved:  Inpatient Rehab Facility    Partner Reserved:  [facility] - ([place]), [id number] [phone]    Clinical needs requested:    Geography searched:  10 miles around [phone]    Start of Service:    Request sent:  3:19pm EST on 2/16/2024 by Eugene Sena    Partner reserved:  11:08am EST on 2/19/2024 by Eugene Sena    Choice list shared:  11:08am EST on 2/19/2024 by Eugene Sena

## 2024-02-19 NOTE — PLAN OF CARE
Problem: PHYSICAL THERAPY ADULT  Goal: Performs mobility at highest level of function for planned discharge setting.  See evaluation for individualized goals.  Description: Treatment/Interventions: Functional transfer training, LE strengthening/ROM, Elevations, Therapeutic exercise, Endurance training, Cognitive reorientation, Bed mobility, Gait training, Spoke to nursing, OT  Equipment Recommended: Walker       See flowsheet documentation for full assessment, interventions and recommendations.  Outcome: Progressing  Note: Prognosis: Fair  Problem List: Decreased strength, Decreased endurance, Impaired balance, Decreased mobility, Decreased cognition, Decreased safety awareness  Assessment: The patient was able to ambulate a few feet before fatiguing today. He was instructed in therapeutic exercise which he completed with tactile instruction. Increased time was necessary due to having to write all instructions down. Will continue to follow in order to safely progress the patient's mobility and safety.  Barriers to Discharge: Inaccessible home environment, Decreased caregiver support     Rehab Resource Intensity Level, PT: II (Moderate Resource Intensity)    See flowsheet documentation for full assessment.

## 2024-02-19 NOTE — ASSESSMENT & PLAN NOTE
-CT scan of the head on 2/16 reviewed: Small volume left frontal subarachnoid hemorrhage suspected no midline shift   -CTA 12/17- Enlarging hypodensity within the left anterior lateral temporal lobe. There are 2 punctate hyperdensities noted within the central aspect of this hypodensity which appears to be sparing the cortex. This is less likely to represent ischemia given the lack of cortical involvement and differentials would include primarily nonhemorrhagic contusion or venous infarction  -F/u repeat CT head this AM  -ECHO- EF 60%, unremarkable  -Continue neurologic checks: Every 1 hours.  -Complete 7 day course of Keppra for seizure prophylaxis  -PT and OT (including cognitive evaluation) evaluation and treatment as indicated.  - Hold all chemical AC and DVT ppx per neurosurgery, no surgical intervention at this time.  - CT venogram per neurology

## 2024-02-19 NOTE — PLAN OF CARE
Problem: Potential for Falls  Goal: Patient will remain free of falls  Description: INTERVENTIONS:  - Educate patient/family on patient safety including physical limitations  - Instruct patient to call for assistance with activity   - Consult OT/PT to assist with strengthening/mobility   - Keep Call bell within reach  - Keep bed low and locked with side rails adjusted as appropriate  - Keep care items and personal belongings within reach  - Initiate and maintain comfort rounds  - Make Fall Risk Sign visible to staff  - Offer Toileting every 2 Hours, in advance of need  - Initiate/Maintain bed/chair alarm  - Obtain necessary fall risk management equipment: nonskid socks  - Apply yellow socks and bracelet for high fall risk patients  - Consider moving patient to room near nurses station  Outcome: Progressing     Problem: Prexisting or High Potential for Compromised Skin Integrity  Goal: Skin integrity is maintained or improved  Description: INTERVENTIONS:  - Identify patients at risk for skin breakdown  - Assess and monitor skin integrity  - Assess and monitor nutrition and hydration status  - Monitor labs   - Assess for incontinence   - Turn and reposition patient  - Assist with mobility/ambulation  - Relieve pressure over bony prominences  - Avoid friction and shearing  - Provide appropriate hygiene as needed including keeping skin clean and dry  - Evaluate need for skin moisturizer/barrier cream  - Collaborate with interdisciplinary team   - Patient/family teaching  - Consider wound care consult   Outcome: Progressing     Problem: PAIN - ADULT  Goal: Verbalizes/displays adequate comfort level or baseline comfort level  Description: Interventions:  - Encourage patient to monitor pain and request assistance  - Assess pain using appropriate pain scale  - Administer analgesics based on type and severity of pain and evaluate response  - Implement non-pharmacological measures as appropriate and evaluate response  -  Consider cultural and social influences on pain and pain management  - Notify physician/advanced practitioner if interventions unsuccessful or patient reports new pain  Outcome: Progressing     Problem: INFECTION - ADULT  Goal: Absence or prevention of progression during hospitalization  Description: INTERVENTIONS:  - Assess and monitor for signs and symptoms of infection  - Monitor lab/diagnostic results  - Monitor all insertion sites, i.e. indwelling lines, tubes, and drains  - Monitor endotracheal if appropriate and nasal secretions for changes in amount and color  - [place] appropriate cooling/warming therapies per order  - Administer medications as ordered  - Instruct and encourage patient and family to use good hand hygiene technique  - Identify and instruct in appropriate isolation precautions for identified infection/condition  Outcome: Progressing  Goal: Absence of fever/infection during neutropenic period  Description: INTERVENTIONS:  - Monitor WBC    Outcome: Progressing     Problem: SAFETY ADULT  Goal: Patient will remain free of falls  Description: INTERVENTIONS:  - Educate patient/family on patient safety including physical limitations  - Instruct patient to call for assistance with activity   - Consult OT/PT to assist with strengthening/mobility   - Keep Call bell within reach  - Keep bed low and locked with side rails adjusted as appropriate  - Keep care items and personal belongings within reach  - Initiate and maintain comfort rounds  - Make Fall Risk Sign visible to staff  - Offer Toileting every 2 Hours, in advance of need  - Initiate/Maintain bed/chair alarm  - Obtain necessary fall risk management equipment: nonskid socks  - Apply yellow socks and bracelet for high fall risk patients  - Consider moving patient to room near nurses station  Outcome: Progressing  Goal: Maintain or return to baseline ADL function  Description: INTERVENTIONS:  -  Assess patient's ability to carry out ADLs; assess  patient's baseline for ADL function and identify physical deficits which impact ability to perform ADLs (bathing, care of mouth/teeth, toileting, grooming, dressing, etc.)  - Assess/evaluate cause of self-care deficits   - Assess range of motion  - Assess patient's mobility; develop plan if impaired  - Assess patient's need for assistive devices and provide as appropriate  - Encourage maximum independence but intervene and supervise when necessary  - Involve family in performance of ADLs  - Assess for home care needs following discharge   - Consider OT consult to assist with ADL evaluation and planning for discharge  - Provide patient education as appropriate  Outcome: Progressing  Goal: Maintains/Returns to pre admission functional level  Description: INTERVENTIONS:  - Perform AM-PAC 6 Click Basic Mobility/ Daily Activity assessment daily.  - Set and communicate daily mobility goal to care team and patient/family/caregiver.   - Collaborate with rehabilitation services on mobility goals if consulted  - Perform Range of Motion 3 times a day.  - Reposition patient every 2 hours.  - Dangle patient 3 times a day  - Stand patient 3 times a day  - Ambulate patient 3 times a day  - Out of bed to chair 3 times a day   - Out of bed for meals 3  Problem: Knowledge Deficit  Goal: Patient/family/caregiver demonstrates understanding of disease process, treatment plan, medications, and discharge instructions  Description: Complete learning assessment and assess knowledge base.  Interventions:  - Provide teaching at level of understanding  - Provide teaching via preferred learning methods  Outcome: Progressing     Problem: Nutrition/Hydration-ADULT  Goal: Nutrient/Hydration intake appropriate for improving, restoring or maintaining nutritional needs  Description: Monitor and assess patient's nutrition/hydration status for malnutrition. Collaborate with interdisciplinary team and initiate plan and interventions as ordered.  Monitor  patient's weight and dietary intake as ordered or per policy. Utilize nutrition screening tool and intervene as necessary. Determine patient's food preferences and provide high-protein, high-caloric foods as appropriate.     INTERVENTIONS:  - Monitor oral intake, urinary output, labs, and treatment plans  - Assess nutrition and hydration status and recommend course of action  - Evaluate amount of meals eaten  - Assist patient with eating if necessary   - Allow adequate time for meals  - Recommend/ encourage appropriate diets, oral nutritional supplements, and vitamin/mineral supplements  - Order, calculate, and assess calorie counts as needed  - Assess need for intravenous fluids  - Provide specific nutrition/hydration education as appropriate  - Include patient/family/caregiver in decisions related to nutrition  Outcome: Progressing    times a day  - Out of bed for toileting  - Record patient progress and toleration of activity level   Outcome: Progressing

## 2024-02-19 NOTE — ARC ADMISSION
ARC  contacted the patient and or family: introduced self, explained role, reviewed ARC program, services offered, acute rehab criteria, approval process, insurance authorization process, ARC locations and preferences. Patient / family preferred ARC location is [place] and second option is [place]. Patient / family made aware ARC Reviewer will keep their Care Manager updated regarding referral status.      Thank you,  Manda Cazares  ARC Admissions

## 2024-02-20 ENCOUNTER — HOSPITAL ENCOUNTER (INPATIENT)
Facility: HOSPITAL | Age: 84
LOS: 45 days | Discharge: HOME HEALTH | DRG: 949 | End: 2024-04-05
Attending: FAMILY MEDICINE | Admitting: FAMILY MEDICINE
Payer: MEDICARE

## 2024-02-20 VITALS
WEIGHT: 126 LBS | RESPIRATION RATE: 18 BRPM | OXYGEN SATURATION: 95 % | SYSTOLIC BLOOD PRESSURE: 94 MMHG | BODY MASS INDEX: 17.07 KG/M2 | DIASTOLIC BLOOD PRESSURE: 62 MMHG | TEMPERATURE: 98.4 F | HEIGHT: 72 IN | HEART RATE: 83 BPM

## 2024-02-20 DIAGNOSIS — M17.12 PRIMARY OSTEOARTHRITIS OF LEFT KNEE: ICD-10-CM

## 2024-02-20 DIAGNOSIS — T78.40XA ALLERGIES: ICD-10-CM

## 2024-02-20 DIAGNOSIS — K59.00 CONSTIPATION: ICD-10-CM

## 2024-02-20 DIAGNOSIS — E53.8 VITAMIN B12 DEFICIENCY: ICD-10-CM

## 2024-02-20 DIAGNOSIS — N13.9 OBSTRUCTIVE UROPATHY: ICD-10-CM

## 2024-02-20 DIAGNOSIS — R41.89 IMPAIRED COGNITION: ICD-10-CM

## 2024-02-20 DIAGNOSIS — B37.0 THRUSH: ICD-10-CM

## 2024-02-20 DIAGNOSIS — L30.4 INTERTRIGO: ICD-10-CM

## 2024-02-20 DIAGNOSIS — N39.0 UTI (URINARY TRACT INFECTION): ICD-10-CM

## 2024-02-20 DIAGNOSIS — N40.0 BPH (BENIGN PROSTATIC HYPERPLASIA): ICD-10-CM

## 2024-02-20 DIAGNOSIS — G47.00 INSOMNIA: ICD-10-CM

## 2024-02-20 DIAGNOSIS — I62.9 INTRACRANIAL HEMORRHAGE (HCC): ICD-10-CM

## 2024-02-20 DIAGNOSIS — I95.1 ORTHOSTATIC HYPOTENSION: ICD-10-CM

## 2024-02-20 DIAGNOSIS — R42 DIZZINESS: ICD-10-CM

## 2024-02-20 DIAGNOSIS — S06.5XAA SDH (SUBDURAL HEMATOMA) (HCC): ICD-10-CM

## 2024-02-20 DIAGNOSIS — Z74.09 IMPAIRED MOBILITY AND ACTIVITIES OF DAILY LIVING: ICD-10-CM

## 2024-02-20 DIAGNOSIS — R33.9 URINARY RETENTION: ICD-10-CM

## 2024-02-20 DIAGNOSIS — E43 SEVERE PROTEIN-CALORIE MALNUTRITION (HCC): ICD-10-CM

## 2024-02-20 DIAGNOSIS — S06.5X9A TRAUMATIC SUBDURAL HEMORRHAGE WITH LOSS OF CONSCIOUSNESS OF UNSPECIFIED DURATION, INITIAL ENCOUNTER (HCC): Primary | ICD-10-CM

## 2024-02-20 DIAGNOSIS — F41.8 DEPRESSION WITH ANXIETY: ICD-10-CM

## 2024-02-20 DIAGNOSIS — R53.81 DEBILITY: ICD-10-CM

## 2024-02-20 DIAGNOSIS — R53.1 GENERALIZED WEAKNESS: ICD-10-CM

## 2024-02-20 DIAGNOSIS — Z78.9 IMPAIRED MOBILITY AND ACTIVITIES OF DAILY LIVING: ICD-10-CM

## 2024-02-20 DIAGNOSIS — E78.00 HYPERCHOLESTEROLEMIA: ICD-10-CM

## 2024-02-20 PROBLEM — Z91.89 BEHAVIOR SAFETY RISK: Status: ACTIVE | Noted: 2024-02-20

## 2024-02-20 PROBLEM — R51.9 HEADACHE: Status: ACTIVE | Noted: 2024-02-20

## 2024-02-20 LAB
ANION GAP SERPL CALCULATED.3IONS-SCNC: 7 MMOL/L
BUN SERPL-MCNC: 25 MG/DL (ref 5–25)
CALCIUM SERPL-MCNC: 9 MG/DL (ref 8.4–10.2)
CHLORIDE SERPL-SCNC: 103 MMOL/L (ref 96–108)
CO2 SERPL-SCNC: 28 MMOL/L (ref 21–32)
CREAT SERPL-MCNC: 0.55 MG/DL (ref 0.6–1.3)
ERYTHROCYTE [DISTWIDTH] IN BLOOD BY AUTOMATED COUNT: 13.7 % (ref 11.6–15.1)
GFR SERPL CREATININE-BSD FRML MDRD: 95 ML/MIN/1.73SQ M
GLUCOSE SERPL-MCNC: 147 MG/DL (ref 65–140)
HCT VFR BLD AUTO: 40.5 % (ref 36.5–49.3)
HGB BLD-MCNC: 13 G/DL (ref 12–17)
MCH RBC QN AUTO: 30.8 PG (ref 26.8–34.3)
MCHC RBC AUTO-ENTMCNC: 32.1 G/DL (ref 31.4–37.4)
MCV RBC AUTO: 96 FL (ref 82–98)
PLATELET # BLD AUTO: 529 THOUSANDS/UL (ref 149–390)
PMV BLD AUTO: 9.1 FL (ref 8.9–12.7)
POTASSIUM SERPL-SCNC: 4.2 MMOL/L (ref 3.5–5.3)
RBC # BLD AUTO: 4.22 MILLION/UL (ref 3.88–5.62)
SODIUM SERPL-SCNC: 138 MMOL/L (ref 135–147)
WBC # BLD AUTO: 6.88 THOUSAND/UL (ref 4.31–10.16)

## 2024-02-20 PROCEDURE — 85027 COMPLETE CBC AUTOMATED: CPT | Performed by: PHYSICIAN ASSISTANT

## 2024-02-20 PROCEDURE — 99238 HOSP IP/OBS DSCHRG MGMT 30/<: CPT | Performed by: SURGERY

## 2024-02-20 PROCEDURE — 80048 BASIC METABOLIC PNL TOTAL CA: CPT | Performed by: PHYSICIAN ASSISTANT

## 2024-02-20 PROCEDURE — NC001 PR NO CHARGE: Performed by: SURGERY

## 2024-02-20 PROCEDURE — 99223 1ST HOSP IP/OBS HIGH 75: CPT | Performed by: PHYSICAL MEDICINE & REHABILITATION

## 2024-02-20 PROCEDURE — NC001 PR NO CHARGE: Performed by: PHYSICAL MEDICINE & REHABILITATION

## 2024-02-20 PROCEDURE — NC001 PR NO CHARGE: Performed by: NEUROLOGICAL SURGERY

## 2024-02-20 PROCEDURE — 97535 SELF CARE MNGMENT TRAINING: CPT

## 2024-02-20 RX ORDER — MECLIZINE HCL 12.5 MG/1
25 TABLET ORAL EVERY 8 HOURS PRN
Status: DISCONTINUED | OUTPATIENT
Start: 2024-02-20 | End: 2024-04-05 | Stop reason: HOSPADM

## 2024-02-20 RX ORDER — ONDANSETRON 4 MG/1
4 TABLET, ORALLY DISINTEGRATING ORAL EVERY 6 HOURS PRN
Status: DISCONTINUED | OUTPATIENT
Start: 2024-02-20 | End: 2024-02-20

## 2024-02-20 RX ORDER — ACETAMINOPHEN 325 MG/1
975 TABLET ORAL EVERY 8 HOURS
Status: DISCONTINUED | OUTPATIENT
Start: 2024-02-20 | End: 2024-02-28

## 2024-02-20 RX ORDER — LANOLIN ALCOHOL/MO/W.PET/CERES
6 CREAM (GRAM) TOPICAL
Status: DISCONTINUED | OUTPATIENT
Start: 2024-02-20 | End: 2024-03-21

## 2024-02-20 RX ORDER — PRAVASTATIN SODIUM 40 MG
40 TABLET ORAL
Status: DISCONTINUED | OUTPATIENT
Start: 2024-02-20 | End: 2024-04-05 | Stop reason: HOSPADM

## 2024-02-20 RX ORDER — LEVETIRACETAM 100 MG/ML
500 SOLUTION ORAL EVERY 12 HOURS SCHEDULED
Status: DISCONTINUED | OUTPATIENT
Start: 2024-02-20 | End: 2024-02-21

## 2024-02-20 RX ORDER — LEVETIRACETAM 100 MG/ML
SOLUTION ORAL
Status: DISPENSED
Start: 2024-02-20 | End: 2024-02-21

## 2024-02-20 RX ORDER — ENOXAPARIN SODIUM 100 MG/ML
30 INJECTION SUBCUTANEOUS EVERY 12 HOURS SCHEDULED
Status: DISCONTINUED | OUTPATIENT
Start: 2024-02-20 | End: 2024-02-20

## 2024-02-20 RX ORDER — SERTRALINE HYDROCHLORIDE 25 MG/1
25 TABLET, FILM COATED ORAL DAILY
Status: DISCONTINUED | OUTPATIENT
Start: 2024-02-21 | End: 2024-04-05 | Stop reason: HOSPADM

## 2024-02-20 RX ORDER — ENOXAPARIN SODIUM 100 MG/ML
40 INJECTION SUBCUTANEOUS
Status: DISCONTINUED | OUTPATIENT
Start: 2024-02-21 | End: 2024-04-04

## 2024-02-20 RX ORDER — LORATADINE 10 MG/1
10 TABLET ORAL DAILY
Status: DISCONTINUED | OUTPATIENT
Start: 2024-02-21 | End: 2024-04-05 | Stop reason: HOSPADM

## 2024-02-20 RX ADMIN — PRAVASTATIN SODIUM 40 MG: 40 TABLET ORAL at 19:57

## 2024-02-20 RX ADMIN — SERTRALINE HYDROCHLORIDE 25 MG: 25 TABLET ORAL at 09:28

## 2024-02-20 RX ADMIN — LEVETIRACETAM 500 MG: 100 SOLUTION ORAL at 21:56

## 2024-02-20 RX ADMIN — ACETAMINOPHEN 975 MG: 325 TABLET, FILM COATED ORAL at 09:28

## 2024-02-20 RX ADMIN — ACETAMINOPHEN 975 MG: 325 TABLET ORAL at 21:29

## 2024-02-20 RX ADMIN — Medication 1 TABLET: at 09:28

## 2024-02-20 RX ADMIN — DOCUSATE SODIUM 100 MG: 100 CAPSULE, LIQUID FILLED ORAL at 09:28

## 2024-02-20 RX ADMIN — LEVETIRACETAM 500 MG: 100 SOLUTION ORAL at 09:28

## 2024-02-20 RX ADMIN — ENOXAPARIN SODIUM 30 MG: 30 INJECTION SUBCUTANEOUS at 09:28

## 2024-02-20 RX ADMIN — CEFTRIAXONE SODIUM 1000 MG: 10 INJECTION, POWDER, FOR SOLUTION INTRAVENOUS at 01:10

## 2024-02-20 RX ADMIN — Medication 6 MG: at 21:29

## 2024-02-20 NOTE — PROGRESS NOTES
I spoke with Allegra Chase, daughter of the patient, per her request. In doing so I reviewed the patient's notes and serial CT scan imaging including the CTA and CTV.  Jessica is a patient of mine.    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  This will likely develop into an established venous infarct.  This process will declare itself definitively over the next couple of weeks.    I do believe that the balance of risk versus benefit strongly favors pharmacologic DVT prophylaxis    Rehabilitation with physical therapy, and speech therapy will give him his best possibility of improvement.

## 2024-02-20 NOTE — OCCUPATIONAL THERAPY NOTE
Occupational Therapy Progress Note     Patient Name: Thomas Chase  Today's Date: 2/20/2024  Problem List  Active Problems:    Hypercholesterolemia    SDH (subdural hematoma) (HCC)    Dysphagia    Debility    Intracranial hemorrhage (HCC)    Bilateral lower extremity weakness    UTI (urinary tract infection)        02/20/24 0850   OT Last Visit   OT Visit Date 02/20/24   Note Type   Note Type Treatment   Pain Assessment   Pain Assessment Tool FLACC   Pain Score No Pain   Patient's Stated Pain Goal No pain   Hospital Pain Intervention(s) Repositioned;Ambulation/increased activity;Emotional support   Pain Rating: FLACC (Rest) - Face 0   Pain Rating: FLACC (Rest) - Legs 0   Pain Rating: FLACC (Rest) - Activity 0   Pain Rating: FLACC (Rest) - Cry 0   Pain Rating: FLACC (Rest) - Consolability 0   Score: FLACC (Rest) 0   Pain Rating: FLACC (Activity) - Face 1   Pain Rating: FLACC (Activity) - Legs 0   Pain Rating: FLACC (Activity) - Activity 0   Pain Rating: FLACC (Activity) - Cry 0   Pain Rating: FLACC (Activity) - Consolability 0   Score: FLACC (Activity) 1   Restrictions/Precautions   Weight Bearing Precautions Per Order No   Other Precautions Cognitive;Chair Alarm;Bed Alarm;Multiple lines;Fall Risk;Pain;Hard of hearing   ADL   Eating Assistance 3  Moderate Assistance   Eating Deficit Setup;Verbal cueing;Supervision/safety;Increased time to complete;Bringing food to mouth assist;Scoop assist   UB Dressing Assistance 3  Moderate Assistance   LB Dressing Assistance 2  Maximal Assistance   Toileting Assistance  2  Maximal Assistance   Bed Mobility   Supine to Sit 4  Minimal assistance   Additional items Assist x 1;Increased time required;Verbal cues;LE management   Transfers   Sit to Stand 4  Minimal assistance   Additional items Assist x 1;Increased time required;Verbal cues   Stand to Sit 4  Minimal assistance   Additional items Assist x 1;Increased time required;Verbal cues   Functional Mobility   Functional  Mobility 4  Minimal assistance   Additional items Rolling walker   Cognition   Overall Cognitive Status Impaired   Arousal/Participation Responsive;Cooperative   Attention Attends with cues to redirect   Orientation Level Oriented to person;Oriented to place   Memory Decreased recall of precautions;Decreased recall of recent events;Decreased short term memory   Following Commands Follows one step commands with increased time or repetition   Activity Tolerance   Activity Tolerance Patient limited by fatigue   Assessment   Assessment Patient seen for OT treatment session with focus on functional transfers/mobility, ADLs, and overall activity tolerance.  Patient pleasant and cooperative, agreeable to participate however presents with flat affect.  Use of whiteboard to assist with communication 2' significantly hard of hearing.  Patient continues to require mod a for upper body dressing and max a for lower body dressing.  Improvement noted in supine to sit transfers, sit to stand transfers and functional mobility to min a with use of rolling walker.  Patient required MOD A for feeding while sitting up in bedside chair including scooping food and bringing food to mouth.  Patient required min a to initiate beverage management, able to bring cup with straw to mouth using bilateral upper extremities for steadying.  Patient appreciative of therapist at end of session.  Left out of bed with all needs in reach + chair alarm activated.  Patient is progressing however remains significantly limited.  Continue to recommend level 2 resources upon discharge.  Will continue to follow to address the initially established OT goals.   Plan   Treatment Interventions ADL retraining;Functional transfer training;Endurance training;Cognitive reorientation;Patient/family training;Equipment evaluation/education;Compensatory technique education;Energy conservation;Activityengagement   Goal Expiration Date 03/02/24   OT Treatment Day 1   OT  Frequency 2-3x/wk   Discharge Recommendation   Rehab Resource Intensity Level, OT II (Moderate Resource Intensity)   AM-PAC Daily Activity Inpatient   Lower Body Dressing 2   Bathing 2   Toileting 2   Upper Body Dressing 2   Grooming 3   Eating 2   Daily Activity Raw Score 13   Daily Activity Standardized Score (Calc for Raw Score >=11) 32.03   AM-PAC Applied Cognition Inpatient   Following a Speech/Presentation 2   Understanding Ordinary Conversation 3   Taking Medications 2   Remembering Where Things Are Placed or Put Away 2   Remembering List of 4-5 Errands 2   Taking Care of Complicated Tasks 1   Applied Cognition Raw Score 12   Applied Cognition Standardized Score 28.82       Documentation completed by JHONY Mayers, OTR/L  MOCA Certified ID# [id number]

## 2024-02-20 NOTE — ASSESSMENT & PLAN NOTE
BMI 17.31  Nutrition following  Optimize oral intake  Supplements ordered  He is eating % of meals  Hydration is variable  Maintain IV  IVF PRN  SLP working towards free water protocol

## 2024-02-20 NOTE — CASE MANAGEMENT
Case Management Discharge Planning Note    Patient name Thomas Chase  Location [facility] 615/[facility] 615-01 MRN [id number]  : 1940 Date 2024       Current Admission Date: 2024  Current Admission Diagnosis:SDH (subdural hematoma) ([facility])   Patient Active Problem List    Diagnosis Date Noted    UTI (urinary tract infection) 2024    Bilateral lower extremity weakness 2024    Intracranial hemorrhage (HCC) 2024    Abnormal CT scan, lumbar spine 02/15/2024    Severe protein-calorie malnutrition (HCC) 2024    Debility 2024    Pharyngeal myoclonus 2023    Dysphagia 2023    Macular degeneration, age related 2023    Traumatic subdural hemorrhage with loss of consciousness of unspecified duration, initial encounter ([facility]) 2023    Sensorineural hearing loss (SNHL), bilateral 2022    Balance disorder 2022    Abnormal echocardiogram 2022    Right bundle-branch block 2022    Moderate protein-calorie malnutrition (HCC) 2022    Hypotension 2022    Diastolic dysfunction 2022    EKG abnormalities 2022    Constipation 2022    SDH (subdural hematoma) ([facility]) 2022    Primary osteoarthritis of left knee 2022    Hygroma 2022    Right hand pain     Carpal tunnel syndrome on right     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[facility]) 2021    Overweight (BMI 25.0-29.9) 2021    Negative depression screening 2019    Hypercholesterolemia 2018    Borderline hypertension 2018    Hearing loss 2009      LOS (days): 4  Geometric Mean LOS (GMLOS) (days): 3  Days to GMLOS:-1.4     OBJECTIVE:  Risk of Unplanned Readmission Score: 13.11         Current admission status: Inpatient   Preferred Pharmacy:   [id number]  [place] - [address]  [address] [address]  Phone: [phone] Fax: [phone]    Primary Care Provider: Jesenia HALL  RONY Badillo    Primary Insurance: MEDICARE  Secondary Insurance: [facility]    DISCHARGE DETAILS:       CM was informed that pt has a bed available today at [facility].  CM will submit for transport today @1400.   CM will follow up, family aware and in agreement

## 2024-02-20 NOTE — PLAN OF CARE
Problem: SAFETY ADULT  Goal: Maintain or return to baseline ADL function  Description: INTERVENTIONS:  -  Assess patient's ability to carry out ADLs; assess patient's baseline for ADL function and identify physical deficits which impact ability to perform ADLs (bathing, care of mouth/teeth, toileting, grooming, dressing, etc.)  - Assess/evaluate cause of self-care deficits   - Assess range of motion  - Assess patient's mobility; develop plan if impaired  - Assess patient's need for assistive devices and provide as appropriate  - Encourage maximum independence but intervene and supervise when necessary  - Involve family in performance of ADLs  - Assess for home care needs following discharge   - Consider OT consult to assist with ADL evaluation and planning for discharge  - Provide patient education as appropriate  Outcome: Progressing     Problem: SAFETY ADULT  Goal: Patient will remain free of falls  Description: INTERVENTIONS:  - Educate patient/family on patient safety including physical limitations  - Instruct patient to call for assistance with activity   - Consult OT/PT to assist with strengthening/mobility   - Keep Call bell within reach  - Keep bed low and locked with side rails adjusted as appropriate  - Keep care items and personal belongings within reach  - Initiate and maintain comfort rounds  - Make Fall Risk Sign visible to staff  - Offer Toileting every 2 Hours, in advance of need  - Initiate/Maintain tab alarm  - Obtain necessary fall risk management equipment: alarms  - Apply yellow socks and bracelet for high fall risk patients  - Consider moving patient to room near nurses station  Outcome: Progressing

## 2024-02-20 NOTE — CONSULTS
PHYSICAL MEDICINE AND REHABILITATION   CONSULT NOTE  Thomas Chase 83 y.o. male MRN: [id number]  Unit/Bed#: [facility] 213-02 Encounter: [id number]    Inpatient consult to Physical Medicine Rehab  Consult performed by: Alexandrea Lugo MD  Consult ordered by: Amada Epperson PA-C          **CHANGE IN REHAB DIAGNOSIS FROM PRE-ADMISSION SCREEN  Rehab Diagnosis: Impairment of mobility, safety, Activities of Daily Living (ADLs), and cognitive/communication skills due to Brain Dysfunction:  02.22  Traumatic, Closed Injury  Etiologic Diagnosis: L SDH, R SAH, L anterior hypodensity lateral temporal lobe    History of Present Illness:   Thomas Chase is a 83 y.o. male with past medical history significant for previous fall with resultant traumatic brain injury-subdural hematoma in 2022 status post left craniotomy, chronic aphasia, hard of hearing with cochlear implant in place, hyperlipidemia who presented to the [facility] on 2/13/2024 with increased weakness and mental status change for several days.  Unclear if patient had head trauma.  CT head showing a 3 mm left frontal subdural hemorrhage.  CT lumbar spine showing DJD.  Patient found to have dysphagia and was seen by speech therapy.  VFSS on 2/15/2024 cleared him for a puréed diet with thin liquids.  Patient was also started by psychiatry for depression and started on Zoloft 25 mg daily.  Patient sustained a rapid response and a unwitnessed fall with head trauma notable bump on head on 2/15/2024.  CT head showing a new small volume acute subarachnoid hemorrhage in the left frontal opercular region as well as new nondisplaced fracture of the zygomatic arch with soft tissue contusion, stable 3 mm subdural hemorrhage seen previously.  Patient transferred to [facility] for further evaluation.  Patient seen by neurology and neurosurgery.  CTA head and neck showing a left anterior hypodensity in the left anterior lateral temporal lobe with 2 punctate  hyperdensities.  These represented possible nonhemorrhagic contusions or venous infarcts.  Less likely to represent ischemia or neoplasm.  Follow-up imaging recommended with CT head in 2-3 weeks.  CT venogram showing patent dural venous sinuses.  Patient was cleared for DVT prophylaxis and started on Keppra for seizure prophylaxis.    Medical course complicated by suspected UTI based on UA and symptoms.  Patient was treated with antibiotics x 3 days.  After medical stabilization, patient found to have acute functional deficits from his baseline in mobility, self-care, speech swallow cognition therefore admitted to [facility] for acute inpatient rehabilitation.    Plan:     Rehabilitation  Functional deficits: Aphasia, dysphagia, impaired cognition, impaired balance, impulsivity, poor safety awareness impaired mobility, self care  Begin PT/OT/SLP.  Rehabilitation goals are to achieve a supervision-modified independent level with mobility and self care.  Supervision with speech swallow and cognition.  Prognosis is fair.  ELOS is 2 weeks.  Estimated discharge is home.     DVT prophylaxis  Continue subcutaneous Lovenox 40 mg daily    Bladder plan  Continent  Checking PVRs x 3    Bowel plan  Continent  Last BM 2/19/2024    Code Status  DNR/DNI      * Intracranial hemorrhage (HCC)  Assessment & Plan  Initial presentation on 2/13/2024 with increased weakness in the legs, mental status changes for several days  CT head showing a 3 mm left frontal subdural hemorrhage  Rapid response 2/15/2024 with fall and head trauma  CT head showing a new small volume acute subarachnoid hemorrhage in the left frontal opercular region and new nondisplaced fracture of the zygomatic arch with soft tissue contusion.    Transferred to [facility]  CTA head and neck and and repeat CT head showing a left anterior hypodensity in the left anterior lateral temporal lobe with 2 punctate hyperdensities.  Differential diagnosis nonhemorrhagic contusions  or venous infarcts.  Patient seen by neurology and neurosurgery  Conservative management  Cleared for DVT prophylaxis  Started on Keppra for seizure prophylaxis  Repeat CT head in 2-3 weeks  Follow-up with neurosurgery and neurology in outpatient clinic.    Behavior safety risk  Assessment & Plan  Patient demonstrating mild impulsivity  Fall x 2 in acute care  Falls at home in the past  Due to his hearing loss and aphasia, can be challenging to redirect at times.  VERY HIGH FALL RISK and risk for further TBI  1:1 continue observation ordered from 7 PM-7 AM (wean and de-escalate when able)  Unfortunately would not be a good candidate for a virtual med-sitter (also note avaialable at [facility]) at this time   Nursing and staff to provide close supervision near nursing station  Patient placed on 4 side rails (not as a restraint, patient and wife preference)  Safety/behavioral plan and log to be recorded  Monitor sleep-wake cycle  Avoid overstimulation: 1 visitor at a time, low lights, low sounds      Headache  Assessment & Plan  Continue Tylenol 975 mg every 8 hours scheduled    UTI (urinary tract infection)  Assessment & Plan  Suspected UTI based on UA and symptoms in acute care  Completed 3 days IV antibiotics    Severe protein-calorie malnutrition (HCC)  Assessment & Plan  BMI 17  Consult nutrition    Dysphagia  Assessment & Plan  VFSS completed 2/15/2024  Cleared for a puréed diet with thin liquids  SLP to follow while at [facility]  Continue aspiration precautions    Hearing loss  Assessment & Plan  Chronic hearing loss  Patient status post cochlear implant  MRI contraindicated    Hypercholesterolemia  Assessment & Plan  Continue simvastatin 20 mg daily (home dose)        Subjective:   Patient seen face-to-face.  Overall nonverbal.  Moving limbs.      Review of Systems   Reason unable to perform ROS: Aphasia.         Function:  Prior level of function and living situation:  Patient resides with his wife and family  in a one-story home with one-step to enter, first-floor set up    PLOF: Min assist with self-care, supervision with cognition, independent with mobility    Current level of function:  Physical Therapy: Transfers are moderate assistance level, ambulation min assist 8 feet with rolling walker  Occupational Therapy: Eating is moderate assist, upper body ADLs moderate assist, lower body ADLs max assist, toileting max assist  Speech Therapy: Puréed diet with thin liquids    Physical Exam:  /68 (BP Location: Right arm)   Pulse 79   Temp 99 °F (37.2 °C) (Temporal)   Resp 16   Ht 6' (1.829 m)   Wt 57.5 kg (126 lb 12.2 oz)   SpO2 95%   BMI 17.19 kg/m²        Intake/Output Summary (Last 24 hours) at 2/20/2024 1832  Last data filed at 2/20/2024 1751  Gross per 24 hour   Intake 100 ml   Output --   Net 100 ml       Body mass index is 17.19 kg/m².      Physical Exam  Vitals and nursing note reviewed.   Constitutional:       General: He is not in acute distress.     Appearance: He is well-developed.      Comments: Thin appearing male   HENT:      Head: Normocephalic and atraumatic.      Ears:      Comments: [place]     Nose: Nose normal.      Mouth/Throat:      Mouth: Mucous membranes are moist.   Eyes:      Conjunctiva/sclera: Conjunctivae normal.   Cardiovascular:      Rate and Rhythm: Normal rate and regular rhythm.      Pulses: Normal pulses.   Pulmonary:      Effort: Pulmonary effort is normal.      Breath sounds: Normal breath sounds. No wheezing or rales.   Abdominal:      General: Bowel sounds are normal. There is no distension.      Palpations: Abdomen is soft.      Tenderness: There is no abdominal tenderness.   Musculoskeletal:         General: No swelling.      Cervical back: Neck supple.      Comments: Diffuse muscular atrophy   Skin:     General: Skin is warm.   Neurological:      Mental Status: He is alert.      Motor: Weakness (Strength in all 4 extremities 4-/5) present.      Comments: Aphasia -  expressive > receptive   Psychiatric:         Mood and Affect: Mood normal.          Labs, medications, and imaging personally reviewed.    Laboratory:    Lab Results   Component Value Date    SODIUM 138 02/20/2024    K 4.2 02/20/2024     02/20/2024    CO2 28 02/20/2024    BUN 25 02/20/2024    CREATININE 0.55 (L) 02/20/2024    GLUC 147 (H) 02/20/2024    CALCIUM 9.0 02/20/2024     Lab Results   Component Value Date    WBC 6.88 02/20/2024    HGB 13.0 02/20/2024    HCT 40.5 02/20/2024    MCV 96 02/20/2024     (H) 02/20/2024     Lab Results   Component Value Date    INR 1.02 06/02/2022    INR 1.10 05/29/2022    INR 1.04 05/28/2022    PROTIME 13.0 06/02/2022    PROTIME 13.7 05/29/2022    PROTIME 13.2 05/28/2022         Current Facility-Administered Medications:     acetaminophen (TYLENOL) tablet 975 mg, 975 mg, Oral, Q8H, Alexandrea Lugo MD    [START ON 2/21/2024] enoxaparin (LOVENOX) subcutaneous injection 40 mg, 40 mg, Subcutaneous, Q24H [facility], Alexandrea Lugo MD    levETIRAcetam (KEPPRA) oral solution 500 mg, 500 mg, Oral, Q12H [facility], Alexandrea Lugo MD    [START ON 2/21/2024] loratadine (CLARITIN) tablet 10 mg, 10 mg, Oral, Daily, Amada Epperson PA-C    melatonin tablet 6 mg, 6 mg, Oral, HS, CHRISTIAN Talbot-C    [START ON 2/21/2024] multivitamin-minerals (CENTRUM) tablet 1 tablet, 1 tablet, Oral, Daily, Amada Epperson PA-C    ondansetron (ZOFRAN-ODT) dispersible tablet 4 mg, 4 mg, Oral, Q6H PRN, CHRISTIAN Talbot-LAUREN    pravastatin (PRAVACHOL) tablet 40 mg, 40 mg, Oral, Daily With Dinner, Amada Epperson PA-C    [START ON 2/21/2024] sertraline (ZOLOFT) tablet 25 mg, 25 mg, Oral, Daily, Amada ROSA Epperson PA-C    No Known Allergies     Patient Active Problem List    Diagnosis Date Noted    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    Behavior safety risk 02/20/2024    Headache 02/20/2024    UTI (urinary tract infection) 02/18/2024    Bilateral lower extremity weakness 02/17/2024     Abnormal CT scan, lumbar spine 02/15/2024    Severe protein-calorie malnutrition (HCC) 02/14/2024    Debility 02/13/2024    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    Dysphagia 11/21/2023    Macular degeneration, age related 02/27/2023    Traumatic subdural hemorrhage with loss of consciousness of unspecified duration, initial encounter ([facility]) 02/27/2023    Sensorineural hearing loss (SNHL), bilateral 08/18/2022    Balance disorder 06/28/2022    Abnormal echocardiogram 06/27/2022    Right bundle-branch block 06/27/2022    Moderate protein-calorie malnutrition (HCC) 06/19/2022    Hypotension 06/19/2022    Diastolic dysfunction 06/19/2022    EKG abnormalities 06/19/2022    Constipation 06/02/2022    SDH (subdural hematoma) ([facility]) 05/27/2022    Primary osteoarthritis of left knee 05/17/2022    Hygroma 04/26/2022    Right hand pain     Carpal tunnel syndrome on right     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[facility]) 09/02/2021    Overweight (BMI 25.0-29.9) 01/27/2021    Negative depression screening 09/26/2019    Hypercholesterolemia 07/12/2018    Borderline hypertension 07/12/2018    Hearing loss 04/08/2009     Past Medical History:   Diagnosis Date    Arthritis     Encounter for general adult medical examination without abnormal findings 03/20/2019    Fall 11/03/2022    Hyperlipidemia     Macular degeneration, wet ([facility])     Urinary retention 06/02/2022     Past Surgical History:   Procedure Laterality Date    BRAIN HEMATOMA EVACUATION Left 05/28/2022    Procedure: left CRANIOTOMY FOR SUBDURAL HEMATOMA;  Surgeon: Chris Watts MD;  Location:  MAIN OR;  Service: Neurosurgery    CARPAL TUNNEL RELEASE      HERNIA REPAIR Right     inguinal    IR CEREBRAL ANGIOGRAPHY / INTERVENTION  06/02/2022    AK COCHLEAR DEVICE IMPLANTATION W/WO MASTOIDECTOMY Left 1/17/2023    Procedure: LEFT COCHLEAR IMPLANTATION WITH MASTOIDECTOMY AND FACIAL NERVE MONITORING WITH AUDIOMETRIC TESTING OF THE IMPLANT;  Surgeon: Susie Tuttle MD;  Location:  [facility];  Service: ENT    KS NEUROPLASTY &/TRANSPOS MEDIAN NRV CARPAL TUNNE Right 09/20/2021    Procedure: RELEASE CARPAL TUNNEL;  Surgeon: Bruno Segovia MD;  Location: [facility];  Service: Orthopedics     Social History     Socioeconomic History    Marital status: /Civil Union     Spouse name: Not on file    Number of children: Not on file    Years of education: Not on file    Highest education level: Not on file   Occupational History    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[employer]   Tobacco Use    Smoking status: Former    Smokeless tobacco: Never    Tobacco comments:     Smoked as a teenager   Vaping Use    Vaping status: Never Used   Substance and Sexual Activity    Alcohol use: Not Currently     Alcohol/week: 3.0 standard drinks of alcohol     Types: 3 Cans of beer per week    Drug use: No    Sexual activity: Not Currently   Other Topics Concern    Not on file   Social History Narrative    Not on file     Social Determinants of Health     Financial Resource Strain: Low Risk  (9/1/2022)    Overall Financial Resource Strain (CARDIA)     Difficulty of Paying Living Expenses: Not hard at all   Food Insecurity: No Food Insecurity (2/20/2024)    Hunger Vital Sign     Worried About Running Out of Food in the Last Year: Never true     Ran Out of Food in the Last Year: Never true   Transportation Needs: No Transportation Needs (2/20/2024)    PRAPARE - Transportation     Lack of Transportation (Medical): No     Lack of Transportation (Non-Medical): No   Physical Activity: Not on file   Stress: Not on file   Social Connections: Not on file   Intimate Partner Violence: Not on file   Housing Stability: Low Risk  (2/20/2024)    Housing Stability Vital Sign     Unable to Pay for Housing in the Last Year: No     Number of Places Lived in the Last Year: 1     Unstable Housing in the Last Year: No     Social History     Tobacco Use   Smoking Status Former   Smokeless Tobacco Never   Tobacco Comments    Smoked as a teenager     Social  History     Substance and Sexual Activity   Alcohol Use Not Currently    Alcohol/week: 3.0 standard drinks of alcohol    Types: 3 Cans of beer per week     History reviewed. No pertinent family history.      Medical Necessity Criteria for [facility] Admission:  Hyperlipidemia, fall precautions, anxiety depression mood management, brain injury education . In addition, the preadmission screen, post-admission physical evaluation, overall plan of care and admissions order demonstrate a reasonable expectation that the following criteria were met at the time of admission to the [facility].  The patient requires active and ongoing therapeutic intervention of multiple therapy disciplines (physical therapy, occupational therapy, speech-language pathology, or prosthetics/orthotics), one of which is physical or occupational therapy.    Patient requires an intensive rehabilitation therapy program, as defined in Chapter 1, section 110.2.2 of the CMS Medicare Policy Manual. This intensive rehabilitation therapy program will consist of at least 3 hours of therapy per day at least 5 days per week or at least 15 hours of intensive rehabilitation therapy within a 7 consecutive day period, beginning with the date of admission to the [facility].    The patient is reasonably expected to actively participate in, and benefit significantly from, the intensive rehabilitation therapy program as defined in Chapter 1, section 110.2.2 of the CMS Medicare Policy Manual at this time of admission to the [facility]. He can reasonably be expected to make measurable improvement (that will be of practical value to improve the patient’s functional capacity or adaptation to impairments) as a result of the rehabilitation treatment, as defined in section 110.3, and such improvement can be expected to be made within the prescribed period of time. As noted in the CMS Medicare Policy Manual, the patient need not be expected to achieve complete independence in the domain of self-care  nor be expected to return to his or her prior level of functioning in order to meet this standard.  The patient must require physician supervision by a rehabilitation physician. As such, a rehabilitation physician will conduct face-to-face visits with the patient at least 3 days per week throughout the patient’s stay in the [facility] to assess the patient both medically and functionally, as well as to modify the course of treatment as needed to maximize the patient’s capacity to benefit from the rehabilitation process.  The patient requires an intensive and coordinated interdisciplinary approach to providing rehabilitation, as defined in Chapter 1, section 110.2.5 of the CMS Medicare Policy Manual. This will be achieved through periodic team conferences, conducted at least once in a 7-day period, and comprising of an interdisciplinary team of medical professionals consisting of: a rehabilitation physician, registered nurse,  and/or , and a licensed/certified therapist from each therapy discipline involved in treating the patient.     Changes Since Pre-admission Assessment: None -This patient's participation in rehab continues to be reasonable, necessary and appropriate.    CMS Required Post-Admission Physician Evaluation Elements  History and Physical, including medical history, functional history and active comorbidities as in above text.       Post-Admission Physician Evaluation:  The patient has the potential to make improvement and is in need of physical, occupational, and/or therapy services. The patient may also need nutritional services. Given the patient's complex medical condition and risk of further medical complications, rehabilitative services cannot be safely provided at a lower level of care, such as a skilled nursing facility. I have reviewed the patient's functional and medical status at the time of the preadmission screening and they are the same as on the day of this admission. I  acknowledge that I have personally performed a full physical examination on this patient within 24 hours of admission. The patient and/or family demonstrated understanding the rehabilitation program and the discharge process after we discussed them.     Agree in entirety: yes  Minor adaptions: none    Major changes: none    Alexandrea Lugo MD    ** Please Note: Fluency Direct voice to text software may have been used in the creation of this document. **      I have spent a total time of 80 minutes on 02/20/24 in caring for this patient including Diagnostic results, Prognosis, Risks and benefits of tx options, Instructions for management, Patient and family education, Importance of tx compliance, Risk factor reductions, Impressions, Counseling / Coordination of care, Documenting in the medical record, Reviewing / ordering tests, medicine, procedures  , Obtaining or reviewing history  , and Communicating with other healthcare professionals .

## 2024-02-20 NOTE — ASSESSMENT & PLAN NOTE
VFSS completed 2/15/2024  Cleared for a puréed diet with nectar thick liquids  SLP to follow while at [facility]  Repeat VFSS 3/11/24: Mild oral dysphagia, Moderate oral-pharyngeal dysphagia  Recommendations:  Diet: Dysphagia 1 pureed   Liquids: Nectar   Patient doing very well with oral food eating % of all of his meals.  Also gaining weight.  At times has difficulty with fluid intake due to nectar thick liquids and dysphagia.  Repeat swallow study recommended in 3-4 weeks  Supplement with IV fluids if needed  SLP also working towards a free water nectar thick protocol with oral care prior  Possible future consideration for PEG

## 2024-02-20 NOTE — PLAN OF CARE
Problem: OCCUPATIONAL THERAPY ADULT  Goal: Performs self-care activities at highest level of function for planned discharge setting.  See evaluation for individualized goals.  Description: Treatment Interventions: ADL retraining, Functional transfer training, Visual perceptual retraining, UE strengthening/ROM, Endurance training, Cognitive reorientation, Patient/family training, Equipment evaluation/education, Fine motor coordination activities, Continued evaluation, Energy conservation, Activityengagement          See flowsheet documentation for full assessment, interventions and recommendations.   Outcome: Progressing  Note: Limitation: Decreased ADL status, Decreased Safe judgement during ADL, Decreased UE strength, Decreased cognition, Decreased endurance, Decreased self-care trans, Decreased high-level ADLs  Prognosis: Fair  Assessment: Patient seen for OT treatment session with focus on functional transfers/mobility, ADLs, and overall activity tolerance.  Patient pleasant and cooperative, agreeable to participate however presents with flat affect.  Use of whiteboard to assist with communication 2' significantly hard of hearing.  Patient continues to require mod a for upper body dressing and max a for lower body dressing.  Improvement noted in supine to sit transfers, sit to stand transfers and functional mobility to min a with use of rolling walker.  Patient required MOD A for feeding while sitting up in bedside chair including scooping food and bringing food to mouth.  Patient required min a to initiate beverage management, able to bring cup with straw to mouth using bilateral upper extremities for steadying.  Patient appreciative of therapist at end of session.  Left out of bed with all needs in reach + chair alarm activated.  Patient is progressing however remains significantly limited.  Continue to recommend level 2 resources upon discharge.  Will continue to follow to address the initially established  OT goals.     Rehab Resource Intensity Level, OT: II (Moderate Resource Intensity)

## 2024-02-20 NOTE — INCIDENTAL FINDINGS
The following findings require follow up:  Radiographic finding   Findings:   1) Small bilateral hypodense renal lesions that are incompletely characterized but may represent cysts.   2) Punctate nonobstructing left renal calcifications.   3) Masslike area of low density within the left anterior lateral temporal lobe    Follow up required: Yes   Follow up should be done within 1 week(s)    Please notify the following clinician to assist with the follow up:   RONY Diaz

## 2024-02-20 NOTE — ASSESSMENT & PLAN NOTE
RESOLVED   Suspected UTI based on UA and symptoms in acute care  Completed 3 days IV antibiotics    In ARC:  UA was ordered by IM : equivocal - neg.   Culture was done by internal medicine.  Enterococcus faecalis  Likely colonized.  Asymptomatic.  Over the weekend, however, Dr. Paz did decide to initiate treatment with Levaquin x 5 days.

## 2024-02-20 NOTE — TREATMENT PLAN
Individualized Plan of Care - [facility]  Thomas Chase 83 y.o. male MRN: [id number]  Unit/Bed#: -02 Encounter: [id number]     PATIENT INFORMATION  ADMISSION DATE: 2/20/2024  3:13 PM DANIELLA CATEGORY: Brain Injury - L SDH, R SAH, L hypodensity   ADMISSION DIAGNOSIS: Cl fracture base skull wth intracran hemorrhage, no loss consciousness (HCC) [S02.109A, S06.300A]  EXPECTED LOS: 2 weeks     MEDICAL/FUNCTIONAL PROGNOSIS  Based on my assessment of the patient's medical conditions and current functional status, the prognosis for attaining medical and functional goals or the IRF stay is:  Fair    Medical Goals: Patient will be medically stable for discharge to [facility] upon completion of rehab program and Patient will be able to manage medical conditions and comorbid conditions with medications and follow up upon completion of rehab program    ANTICIPATED DISCHARGE DISPOSITION AND SERVICES  COMMUNITY SETTING: Home - Assistance    ANTICIPATED FOLLOW-UP SERVICE:   Home Health Services: PT, OT, SLP, and Nursing    DISCIPLINE SPECIFIC PLANS:  Required Disciplines & Services: Rehabillitation Nursing, Case Management, and Dietay/Nutrition    REQUIRED THERAPY:  Therapy Hours per Day Days per Week Total Days   Physical Therapy 1 5-6 7   Occupational Therapy 1 5-6 7   Speech/Language Therapy 1 5-6 7   NOTE: Additional therapy time(s) may be added as appropriate to meet patient needs and to achieve functional goals.      ANTICIPATED FUNCTIONAL OUTCOMES:  ADL:  Supervision - Mod I with LRAD   Bladder/Bowel:  Supervision - Mod I with LRAD   Transfers:  Supervision - Mod I with LRAD   Locomotion:  Supervision - Mod I with LRAD   Cognitive:  supervision      DISCHARGE PLANNING NEEDS  Equipment needs: Discharge needs to be reviewed with team      REHAB ANTICIPATED PARTICIPATION RESTRICTIONS:  None

## 2024-02-20 NOTE — CASE MANAGEMENT
Case Management Discharge Planning Note    Patient name Thomas Chase  Location [facility]-01 MRN [id number]  : 1940 Date 2024       Current Admission Date: 2024  Current Admission Diagnosis:Bilateral lower extremity weakness   Patient Active Problem List    Diagnosis Date Noted    UTI (urinary tract infection) 2024    Bilateral lower extremity weakness 2024    Intracranial hemorrhage (HCC) 2024    Abnormal CT scan, lumbar spine 02/15/2024    Severe protein-calorie malnutrition (HCC) 2024    Debility 2024    Pharyngeal myoclonus 2023    Dysphagia 2023    Macular degeneration, age related 2023    Traumatic subdural hemorrhage with loss of consciousness of unspecified duration, initial encounter ([facility]) 2023    Sensorineural hearing loss ([facility]), bilateral 2022    Balance disorder 2022    Abnormal echocardiogram 2022    Right bundle-branch block 2022    Moderate protein-calorie malnutrition (HCC) 2022    Hypotension 2022    Diastolic dysfunction 2022    EKG abnormalities 2022    Constipation 2022    SDH (subdural hematoma) ([facility]) 2022    Primary osteoarthritis of left knee 2022    Hygroma 2022    Right hand pain     Carpal tunnel syndrome on right     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[facility]) 2021    Overweight (BMI 25.0-29.9) 2021    Negative depression screening 2019    Hypercholesterolemia 2018    Borderline hypertension 2018    Hearing loss 2009      LOS (days): 4  Geometric Mean LOS (GMLOS) (days): 3  Days to GMLOS:-1.4     OBJECTIVE:  Risk of Unplanned Readmission Score: 13.11         Current admission status: Inpatient   Preferred Pharmacy:   [facility] [place] - [address]  [address] [address]  Phone: [phone] Fax: [phone]    Primary Care Provider: Jesenia HALL  RONY Badlilo    Primary Insurance: MEDICARE  Secondary Insurance: [facility]    DISCHARGE DETAILS:    Pt will d/c to [facility] EMS will transport @ 1330  EMS company will transport the pt's wife to the facility as well. CM spoke to [place], of [facility], and confirmed

## 2024-02-20 NOTE — PLAN OF CARE
Problem: Prexisting or High Potential for Compromised Skin Integrity  Goal: Skin integrity is maintained or improved  Description: INTERVENTIONS:  - Identify patients at risk for skin breakdown  - Assess and monitor skin integrity  - Assess and monitor nutrition and hydration status  - Monitor labs   - Assess for incontinence   - Turn and reposition patient  - Assist with mobility/ambulation  - Relieve pressure over bony prominences  - Avoid friction and shearing  - Provide appropriate hygiene as needed including keeping skin clean and dry  - Evaluate need for skin moisturizer/barrier cream  - Collaborate with interdisciplinary team   - Patient/family teaching  - Consider wound care consult   Outcome: Progressing     Problem: INFECTION - ADULT  Goal: Absence of fever/infection during neutropenic period  Description: INTERVENTIONS:  - Monitor WBC    Outcome: Progressing     Problem: Nutrition/Hydration-ADULT  Goal: Nutrient/Hydration intake appropriate for improving, restoring or maintaining nutritional needs  Description: Monitor and assess patient's nutrition/hydration status for malnutrition. Collaborate with interdisciplinary team and initiate plan and interventions as ordered.  Monitor patient's weight and dietary intake as ordered or per policy. Utilize nutrition screening tool and intervene as necessary. Determine patient's food preferences and provide high-protein, high-caloric foods as appropriate.     INTERVENTIONS:  - Monitor oral intake, urinary output, labs, and treatment plans  - Assess nutrition and hydration status and recommend course of action  - Evaluate amount of meals eaten  - Assist patient with eating if necessary   - Allow adequate time for meals  - Recommend/ encourage appropriate diets, oral nutritional supplements, and vitamin/mineral supplements  - Order, calculate, and assess calorie counts as needed  - Assess need for intravenous fluids  - Provide specific nutrition/hydration education  as appropriate  - Include patient/family/caregiver in decisions related to nutrition  Outcome: Progressing

## 2024-02-20 NOTE — ASSESSMENT & PLAN NOTE
Doing well in ARC  Safety behavior plan going well.  Q15 min checks - at times impulsive getting up from bed, forgetful to use call bell, but does respond well to reminders.     Intermittently forgetful to use call bell, but usually does well with this.   Nursing and staff to provide close supervision near nursing station  Patient placed on 4 side rails (not as a restraint, patient and wife preference)  Monitor sleep-wake cycle - better overall     Patient demonstrating mild impulsivity in acute care only   Fall x 2 in acute care  Falls at home in the past

## 2024-02-20 NOTE — PROGRESS NOTES
[facility]  Progress Note  Name: Thomas Chase I  MRN: [id number]  Unit/Bed#: [facility] 615-01 I Date of Admission: 2/16/2024   Date of Service: 2/20/2024 I Hospital Day: 4    Assessment/Plan   UTI (urinary tract infection)  Assessment & Plan  -continue rocephin x 3 days  -F/u urine cultures    Intracranial hemorrhage (HCC)  Assessment & Plan  -CT scan of the head on 2/16 reviewed: Small volume left frontal subarachnoid hemorrhage suspected no midline shift   -CTA 12/17- Enlarging hypodensity within the left anterior lateral temporal lobe. There are 2 punctate hyperdensities noted within the central aspect of this hypodensity which appears to be sparing the cortex. This is less likely to represent ischemia given the lack of cortical involvement and differentials would include primarily nonhemorrhagic contusion or venous infarction  -F/u repeat CT head this AM  -ECHO- EF 60%, unremarkable  -Continue neurologic checks: Every 1 hours.  -Complete 7 day course of Keppra for seizure prophylaxis  -PT and OT (including cognitive evaluation) evaluation and treatment as indicated.  - Hold all chemical AC and DVT ppx per neurosurgery, no surgical intervention at this time.  - CT venogram per neurology      Debility  Assessment & Plan  - PT/OT  - Geriatric consult    Dysphagia  Assessment & Plan  -Speech therapy is following  -Continue dysphagia 1 pureed and thin liquids.     SDH (subdural hematoma) (HCC)  Assessment & Plan  - Patient with history of subdural hematoma s/p craniotomy and SD drain placement x 2 in May of 2022  - See intracranial hemorrhage A&P    Hypercholesterolemia  Assessment & Plan  - Continue home simvastatin             Bowel Regimen: Senokot  VTE Prophylaxis:Enoxaparin (Lovenox)     Disposition: ARC    Subjective     Subjective: Patient attempted to verbalize, but speech was incoherent.     Objective   Vitals:   Temp:  [96 °F (35.6 °C)-98.4 °F (36.9 °C)] 98.4 °F (36.9  °C)  HR:  [79-87] 83  Resp:  [17-18] 18  BP: ()/(62-82) 94/62    I/O         02/18 0701  02/19 0700 02/19 0701  02/20 0700 02/20 0701  02/21 0700    P.O. 600 540 120    Total Intake(mL/kg) 600 (10.5) 540 (9.4) 120 (2.1)    Urine (mL/kg/hr) 1100 (0.8)      Total Output 1100      Net -500 +540 +120           Unmeasured Urine Occurrence 2 x 2 x              Physical Exam:   General: NAD  Neuro: Sleeping on exam. Able to wake up to sternal rub. Aphasic, intermittently follows commands.  Attempts at verbalization are incoherent.  HEENT: Normocephalic, atraumatic, moist mucus membranes  CV: regular rate  Lung: normal work of breathing  Abd: Soft, nontender, nonrigid, without guarding or rebound.  MSK: moves extremities spontaneously.    Invasive Devices       Peripheral Intravenous Line  Duration             Peripheral IV 02/17/24 Right;Upper;Ventral (anterior) Arm 3 days              Drain  Duration             External Urinary Catheter Medium 3 days                          Lab Results: Results: I have personally reviewed all pertinent laboratory/tests results  Imaging: I have personally reviewed pertinent reports.     Other Studies: None

## 2024-02-20 NOTE — DISCHARGE SUMMARY
Discharge Summary - Thomas Chase 83 y.o. male MRN: [id number]    [facility] Encounter: [id number]    Admission Date:   Admission Orders (From admission, onward)       Ordered        02/16/24 0322  Inpatient Admission  Once                            Admitting Diagnosis: Subarachnoid hemorrhage (HCC) [I60.9]  Debility [R53.81]  Intracranial hemorrhage (HCC) [I62.9]    HPI: Thomas Chase is a 83 y.o. male who presents with Fall.  Patient was originally admitted to [facility] on 2/13 for debility.  Patient had an unwitnessed fall.  Patient was found by nursing staff patient lying on the floor.  Rapid response was called and CT head and CT C-spine was ordered.  CT head showed left-sided acute subarachnoid hemorrhage.  Hx limited due to patient's baseline aphasia. Patient was transferred to [facility] for trauma evaluation     Procedures Performed: No orders of the defined types were placed in this encounter.      Summary of Hospital Course: Patient underwent evaluation by neurosurgery who recommended no surgical intervention and neurology consult. Neurology recommended CT venogram of head which demonstrated a venous occlusion in the dominant temporal lobe. He was deemed stable for discharge on 2/20.    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      Provisions for Follow-Up Care:  See after visit summary for information related to follow-up care and any pertinent home health orders.      PCP: RONY Diaz    Disposition: ARC    Planned Readmission: No      Discharge Statement    I spent 30 minutes discharging the patient. This time was spent on the day of discharge. I had direct contact with the patient on the day of discharge. Additional documentation is required if more than 30 minutes were spent on discharge.     Discharge Medications:  See after visit summary for reconciled discharge medications provided to patient and family.

## 2024-02-20 NOTE — ASSESSMENT & PLAN NOTE
Initial presentation on 2/13/2024 with increased weakness in the legs, mental status changes for several days  CT head showing a 3 mm left frontal subdural hemorrhage  Rapid response 2/15/2024 with fall and head trauma  CT head showing a new small volume acute subarachnoid hemorrhage in the left frontal opercular region and new nondisplaced fracture of the zygomatic arch with soft tissue contusion.    Transferred to [facility]  CTA head and neck and and repeat CT head showing a left anterior hypodensity in the left anterior lateral temporal lobe with 2 punctate hyperdensities.  Differential diagnosis nonhemorrhagic contusions or venous infarcts.  Patient seen by neurology and neurosurgery  Conservative management  Cleared for DVT prophylaxis  Started on Keppra for seizure prophylaxis  Repeat CT head scheduled on Monday, 3/4/2024 -personally reviewed  Radiology read as follows:  Improving left anterior temporal lobe contusions with resolving edema. Stable subacute subdural hemorrhage along the anterior left frontal convexity. Resolved subarachnoid and intraventricular hemorrhages. Unchanged minimal hyperdensity beneath the left craniotomy flap. Stable right parafalcine subdural hygroma. There is no new intra or extra-axial hemorrhage.  Follow-up with neurosurgery on 3/6/2024 virtually completed.  Recommendations as follows: Neurosurgery will sign off, patient will follow-up in 2 weeks with repeat CT head   CTH 3/16/24: No acute intracranial abnormality.Stable thin chronic left frontal subdural collection stable since 11/10/2023. Evaluation of the left temporal lobe is limited due to artifact from the cochlear implant but there may be mild developing encephalomalacia related to prior contusion.  Follow-up with neurosurgery and neurology in outpatient clinic (appointment is 3/21/24 with neurology in 3/22/2024 with neurosurgery)  Follow-up with Neurology in 3 months  Follow-up with Neurosurgery PRN

## 2024-02-21 PROBLEM — N39.0 UTI (URINARY TRACT INFECTION): Status: RESOLVED | Noted: 2024-02-18 | Resolved: 2024-02-21

## 2024-02-21 LAB
BACTERIA UR QL AUTO: ABNORMAL /HPF
BILIRUB UR QL STRIP: NEGATIVE
CLARITY UR: ABNORMAL
COLOR UR: YELLOW
GLUCOSE UR STRIP-MCNC: NEGATIVE MG/DL
HGB UR QL STRIP.AUTO: ABNORMAL
KETONES UR STRIP-MCNC: NEGATIVE MG/DL
LEUKOCYTE ESTERASE UR QL STRIP: ABNORMAL
NITRITE UR QL STRIP: NEGATIVE
NON-SQ EPI CELLS URNS QL MICRO: ABNORMAL /HPF
PH UR STRIP.AUTO: 6.5 [PH]
PROT UR STRIP-MCNC: NEGATIVE MG/DL
RBC #/AREA URNS AUTO: ABNORMAL /HPF
SP GR UR STRIP.AUTO: 1.02
UROBILINOGEN UR QL STRIP.AUTO: 0.2 E.U./DL
WBC #/AREA URNS AUTO: ABNORMAL /HPF

## 2024-02-21 PROCEDURE — 97542 WHEELCHAIR MNGMENT TRAINING: CPT

## 2024-02-21 PROCEDURE — 99233 SBSQ HOSP IP/OBS HIGH 50: CPT | Performed by: PHYSICAL MEDICINE & REHABILITATION

## 2024-02-21 PROCEDURE — 97535 SELF CARE MNGMENT TRAINING: CPT

## 2024-02-21 PROCEDURE — 97116 GAIT TRAINING THERAPY: CPT

## 2024-02-21 PROCEDURE — 97166 OT EVAL MOD COMPLEX 45 MIN: CPT

## 2024-02-21 PROCEDURE — 97530 THERAPEUTIC ACTIVITIES: CPT

## 2024-02-21 PROCEDURE — 97163 PT EVAL HIGH COMPLEX 45 MIN: CPT

## 2024-02-21 PROCEDURE — 92526 ORAL FUNCTION THERAPY: CPT | Performed by: NURSE PRACTITIONER

## 2024-02-21 PROCEDURE — 92610 EVALUATE SWALLOWING FUNCTION: CPT | Performed by: NURSE PRACTITIONER

## 2024-02-21 PROCEDURE — 97110 THERAPEUTIC EXERCISES: CPT

## 2024-02-21 PROCEDURE — 81001 URINALYSIS AUTO W/SCOPE: CPT | Performed by: PHYSICAL MEDICINE & REHABILITATION

## 2024-02-21 RX ORDER — TAMSULOSIN HYDROCHLORIDE 0.4 MG/1
0.4 CAPSULE ORAL
Status: DISCONTINUED | OUTPATIENT
Start: 2024-02-21 | End: 2024-02-29

## 2024-02-21 RX ORDER — LEVETIRACETAM 100 MG/ML
500 SOLUTION ORAL EVERY 12 HOURS SCHEDULED
Status: COMPLETED | OUTPATIENT
Start: 2024-02-21 | End: 2024-02-22

## 2024-02-21 RX ADMIN — TAMSULOSIN HYDROCHLORIDE 0.4 MG: 0.4 CAPSULE ORAL at 17:37

## 2024-02-21 RX ADMIN — PRAVASTATIN SODIUM 40 MG: 40 TABLET ORAL at 17:37

## 2024-02-21 RX ADMIN — ACETAMINOPHEN 975 MG: 325 TABLET ORAL at 13:41

## 2024-02-21 RX ADMIN — LORATADINE 10 MG: 10 TABLET ORAL at 08:22

## 2024-02-21 RX ADMIN — Medication 1 TABLET: at 08:22

## 2024-02-21 RX ADMIN — LEVETIRACETAM 500 MG: 100 SOLUTION ORAL at 12:53

## 2024-02-21 RX ADMIN — NYSTATIN 500000 UNITS: 100000 SUSPENSION ORAL at 21:26

## 2024-02-21 RX ADMIN — LEVETIRACETAM 500 MG: 100 SOLUTION ORAL at 21:28

## 2024-02-21 RX ADMIN — NYSTATIN 500000 UNITS: 100000 SUSPENSION ORAL at 17:37

## 2024-02-21 RX ADMIN — Medication 6 MG: at 21:26

## 2024-02-21 RX ADMIN — SERTRALINE HYDROCHLORIDE 25 MG: 25 TABLET ORAL at 08:22

## 2024-02-21 RX ADMIN — ACETAMINOPHEN 975 MG: 325 TABLET ORAL at 21:26

## 2024-02-21 RX ADMIN — ENOXAPARIN SODIUM 40 MG: 40 INJECTION SUBCUTANEOUS at 08:22

## 2024-02-21 NOTE — PCC CARE MANAGEMENT
Initial assessment & orientation to ARC with Pt & his wife, who was visiting bedside. Pt had difficulty hearing this worker, but his wife expressed that she is in agreement with tentative team recommendations. Tx team meeting was held today & tentative target DC date is 3/8, with home health services (all services). Discussed role of team members & reviewed TX & DC planning with Pt & Pt's spouse, who expressed understanding & agreement. Pt's additional family also visiting today & also present when physician assessed Pt.  SW will continue to monitor & assist as needed with TX & DC planning.     2/28/24 - Tx team recommendations reviewed with patient & family, who expressed understanding & agreement. Target DC date is 3/8 with [facility] (all); a list of providers was provided to Pt & a referral will be made based on Pt preference (expressed interest in [facility] & [facility]). Pt's spouse inquiring if there may be an extension; discussed that this will be reviewed at the team meeting next week. Provided information regarding meal options. SW will continue to monitor & assist as needed with Tx & DC planning.

## 2024-02-21 NOTE — TEAM CONFERENCE
Acute RehabilitationTeam Conference Note  Date: 2/21/2024   Time: 11:07 AM       Patient Name:  Thomas Chase       Medical Record Number: [id number]   YOB: 1940  Sex: Male          Room/Bed:  [facility] 213/[facility] 213-02  Payor Info:  Payor: MEDICARE / Plan: MEDICARE A AND B / Product Type: Medicare A & B Fee for Service /      Admitting Diagnosis: Cl fracture base skull wth intracran hemorrhage, no loss consciousness (HCC) [S02.109A, S06.300A]   Admit Date/Time:  2/20/2024  3:13 PM  Admission Comments: No comment available     Primary Diagnosis:  Intracranial hemorrhage (HCC)  Principal Problem: Intracranial hemorrhage (HCC)    Patient Active Problem List    Diagnosis Date Noted    Headache 02/20/2024    Behavior safety risk 02/20/2024    UTI (urinary tract infection) 02/18/2024    Bilateral lower extremity weakness 02/17/2024    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    Abnormal CT scan, lumbar spine 02/15/2024    Severe protein-calorie malnutrition (HCC) 02/14/2024    Debility 02/13/2024    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    Dysphagia 11/21/2023    Macular degeneration, age related 02/27/2023    Traumatic subdural hemorrhage with loss of consciousness of unspecified duration, initial encounter ([facility]) 02/27/2023    Sensorineural hearing loss (SNHL), bilateral 08/18/2022    Balance disorder 06/28/2022    Abnormal echocardiogram 06/27/2022    Right bundle-branch block 06/27/2022    Moderate protein-calorie malnutrition (HCC) 06/19/2022    Hypotension 06/19/2022    Diastolic dysfunction 06/19/2022    EKG abnormalities 06/19/2022    Constipation 06/02/2022    SDH (subdural hematoma) ([facility]) 05/27/2022    Primary osteoarthritis of left knee 05/17/2022    Hygroma 04/26/2022    Right hand pain     Carpal tunnel syndrome on right     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[facility]) 09/02/2021    Overweight (BMI 25.0-29.9) 01/27/2021    Negative depression screening 09/26/2019    Hypercholesterolemia 07/12/2018    Borderline  hypertension 07/12/2018    Hearing loss 04/08/2009       Physical Therapy:    Weight Bearing Status: Weight Bearing as Tolerated  Transfers: Moderate Assistance  Bed Mobility: Minimal Assistance  Amulation Distance (ft): 66 feet  Ambulation: Minimal Assistance, Moderate Assistance (with wheelchair follow)  Assistive Device for Ambulation: Roller Walker  Wheelchair Mobility Distance: 133 ft  Wheelchair Mobility: Maximum Assistance (max visual and tactile cues)  Number of Stairs:  (TBA as able)  Assistive Device for Stairs:  (TBA as able)  Stair Assistance:  (TBA as able)  Ramp:  (TBA as able)  Assistive Device for Ramp:  (TBA as able)  Discharge Recommendations: Home with:  DC Home with:: Family Support, First Floor Setup, Home Physical Therapy, Outpatient Physical Therapy     02.21/2024:   Patient seen today for IE.  Presents, following hospitalizaton  for  traumatic brain dysfunction (L SDH, R SAH, L anterior hypodensity lateral temporal lobe) and PMH significant for previous fall with TBI (SDH with L craniotomy), chronic aphasia, hard of hearing with cochlear implants in place, and hyperlipidemia.    Patient MIN A bed mobility, MOD A transfers with walker, MAX A wheelchair mobility level and unlevel surfaces up to 133' with increased visual and tactile cues, min-mod A ambulation up to 66' level and unlevel surfaces with walker and wheelchair follow.  Patient limited by decreased ROM/strength, decreased balance and safety, decreased endurance, impaired  cognition, and impaired hearing (patient wears cochlear implants).   May benefit from continued inpatient ARC PT to increase function, safety, and increased independence in prep for safe d/c to home with family and continued PT services as needed.    Occupational Therapy:  Eating: Moderate Assistance, Maximum Assistance  Grooming: Minimal Assistance  Bathing: Moderate Assistance  Bathing: Moderate Assistance  Upper Body Dressing: Moderate Assistance  Lower Body  Dressing: Maximum Assistance  Toileting: Maximum Assistance  Tub/Shower Transfer:  (TBA)  Toilet Transfer: Minimal Assistance  Cognition: Exceptions to WNL  Cognition: Decreased Memory, Decreased Executive Functions, Decreased Attention, Decreased Comprehension, Decreased Safety, Impulsive  Orientation: Person  Discharge Recommendations: Home with:  DC Home with:: 24 Hour Supervision, Family Support, First Floor Setup, Home Occupational Therapy       2/21/24: Pt new admit to ARU. Current LOF listed above. Barriers to d/c include decreased strength throughout, decreased balance, impaired cognition including safety awareness, problem solving, attention, comprehension, expression, and short term memory, and decreased activity tolerance; all affect independence in self care and fxl transfers. Pt will participate in ADL training, therapeutic exercises and activities, fxl mobility/transfers, cognitive training, and activity tolerance in order to progress towards goals. Pt would benefit from continued skilled OT services in order to address listed barriers and prepare for safe d/c.     Speech Therapy:           2/21 per nursing patient coughing with thin liquids. Diet downgraded to nectar thick liquids, consult pending this date.     Nursing Notes:  Appetite: Fair  Diet Type: Dysphagia I, Nectar thick liquids                      Diet Patient/Family Education Complete: Yes    Type of Wound (LDA):  (protection with allevyn to heels and sacrum, multiple scabs)                    Type of Wound Patient/Family Education: Yes  Bladder: 1 - Total Assistance     Bladder Patient/Family Education: Yes  Bowel: 1 - Total Assistance     Bowel Patient/Family Education: Yes     Pain Score: Med Not Given for Pain - for MAR use only                          Pain Patient/Family Education: Yes  Medication Management/Safety  Injectable: Lovenox  Safe Administration: Yes  Medication Patient/Family Education Complete: Yes    No notes on  file    Case Management:     Discharge Planning  Living Arrangements: Lives w/ Spouse/significant other  Support Systems: Spouse/significant other, Son, Daughter  Assistance Needed: unknown  Type of Current Residence: Private residence  Current Home Care Services: No  No notes on file    Is the patient actively participating in therapies? yes  List any modifications to the treatment plan: na    Barriers Interventions   TBI with subdural Medical management and oversight, 1:1 over night for safety   Deaf, [place] Cochlear implants, visual cues   Decreased cog, high fall risk, safety, impulsive, dysphagia- coughing on thin liquids ST strategies, ADL/transfer/gait training, puree diet    Decreased strength Therapeutic exercise, therapeutic activity   Incontinent of B/B, neurogenic bowel and bladder Timed voiding, bladder scans, straight cath     Is the patient making expected progress toward goals? yes  List any update or changes to goals: na    Medical Goals: Patient will be able to manage medical conditions and comorbid conditions with medications and follow up upon completion of rehab program    Weekly Team Goals:   Rehab Team Goals  ADL Team Goal: Patient will require supervision with ADLs with least restrictive device upon completion of rehab program    CG/Min assist Bed mobility, transfers, and mobility  Min assist self care    Health and wellness: to be able to return home and complete activities with family    Discussion: Plan for return home with wife with family support with [facility] for PT, OT, ST, nursing, and aides    Anticipated Discharge Date:  March 8, 2024

## 2024-02-21 NOTE — PCC SPEECH THERAPY
2/21 per nursing patient coughing with thin liquids. Diet downgraded to nectar thick liquids, consult pending this date.     2/28     Cognitive Linguisitic Assessments   Cognitive Linquistic Quick Test (CLQT) BCAT 8/21- pt only able to participate via white board 2' hearing loss     Comprehension   Assist Devices Other  (cochlear implants)   Auditory    (white board for communication)   QI: Comprehension 2. Sometimes Understands: Understands only basic conversations or simple, direct phrases. Frequently requires cues to understand   Comprehension (FIM) 2 - Understands only simple expressions or gestures (waves, hello)   Expression   Non-Verbal Basic   QI: Expression 2. Frequently exhibits difficulty with expressing needs and ideas   Expression (FIM) 2 - Uses only simple expressions or gestures (waves, hello)   Social Interaction   Social Interaction (FIM) 2 - Interacts appropriately 25-49% of time   Problem Solving   Problem solving (FIM) 2 - Solves basic problems 25-49% of time   Memory   Memory (FIM) 2 - Recognizes, recalls/performs 25-49%   Memory Skills   Orientation Level Oriented to person   Consistencies Assessed and Performance   Materials Admnistered Puree/Level 1;Nectar thick liquid   Oral Stage Mild impaired   Phargngeal Stage Moderate impaired   Swallow Mechanics Mild delayed;Swallow initation;Weak larygneal rise;Aspiration risk   Strategies and Efficacy small bites, small sips, slow rate   Recommendations   Risk for Aspiration Present   Recommendations Consider oral diet   Diet Solid Recommendation Level 1 Dysphagia/pureed   Diet Liquid Recommendation Nectar thick liquid   Recommended Form of Meds Whole;With thick liquid   General Precautions Aspiration precautions;Feed only when alert;Minimize distractions;Upright as possible for all oral intake;Remain upright for 45 mins after meals   Compensatory Swallowing Strategies Alternate solids and liquids   Results Reviewed with PT/Family/Caregiver     Eating    Type of Assistance Needed Supervision  (visual cues)   Physical Assistance Level No physical assistance   Eating CARE Score 4       3/6 Continue puree and nectar thick liquids via 10CC cup and water via 5CC. Continue to abide by strict aspiration precautions. Plan to repeat VBS prior to discharge. Last VBS was 2/14/24.     3/13  Repeat VBS on 3/11/24 recommendations for pureed solids and nectar thick liquids. Overall concern for swallow decline from previous VBS on 2/14/24 with recommendations at that time for pureed solids and thin liquids. Sigificant pharyngeal residue with all consistencies is a concern for diet tolerance across meals with increased aspiration risk. Strict aspiration precautions, swallow strategies and direct supervision with meals continues.     UPDATED FIM LEVELS   Comprehension   Comprehension (FIM) 3 - Understands basic info/conversation 50-74% of time  (via written direction in short phrases, gesture or lip reading)   Expression   Expression (FIM) 3 - Expresses basic info/needs 50-74% of time  (wants/needs via verbal 1-2 word phrases or gesture.)   Social Interaction   Social Interaction (FIM) 4 - Interacts 75-89% of time  (increased social greetings and attention throughout session.)   Problem Solving   Problem solving (FIM) 2 - Solves basic problems 25-49% of time   Memory   Memory (FIM) 2 - Recognizes, recalls/performs 25-49%     3/20 Sigificant pharyngeal residue with all consistencies is a concern for diet tolerance across meals with increased aspiration risk. Strict aspiration precautions, swallow strategies and direct supervision with meals continues. He is allowed water only via 5CC provalve cup or single controlled sips via straw ( pinching and removing ) to control rate.   UPDATED FIM LEVELS     Comprehension   Comprehension (FIM) 4 - Understands basic info/conversation 75-90% of time   Expression   Expression (FIM) 3 - Expresses basic info/needs 50-74% of time   Social  Interaction   Social Interaction (FIM) 5 - Interacts appropriately with others 90% of time   Problem Solving   Problem solving (FIM) 3 - Solves basic problmes 50-74% of time   Memory   Memory (FIM) 3 - Recognizes, recalls/performs 50-74%   Memory Skills       3/27 continue same diet recs, pending discharge home with 24/7 supervision vs. SNF.     peech/Language/Cognition Assessmetn   Treatment Assessment attempted same low tech AAC trials as yesterday but with decreases field to F6. No initiation at first requirng max cues to get started with decreased cues to min.   Swallow Assessment   Swallow Treatment Assessment patient received OOB in chair with wife, son and son's significant other present. Patient [facility] through lunch tray. Inconsistent wet cough upon arrival. Trials of nectar thick liquid via 5 cc cup and via single controlled straw sips via pinching the straw and removing. Pt is not impulsive via straw. It is important to note that patient with increased delay in the initiation of the swallow with sometimes no initiation at all until visual/written cue up to 10 seconds. This is likely due to fatigue of the swallow. He would benefit from keeping meals no longer than 45 minutes. Family educated bedside.     UPDATED FIM LEVELS   Comprehension   Assist Devices Hearing Aid   Comprehension (FIM) 4 - Understands basic info/conversation 75-90% of time   Expression   Expression (FIM) 3 - Expresses basic info/needs 50-74% of time   Social Interaction   Social Interaction (FIM) 5 - Interacts appropriately with others 90% of time   Problem Solving   Problem solving (FIM) 3 - Solves basic problmes 50-74% of time   Memory   Memory (FIM) 3 - Recognizes, recalls/performs 50-74%     4/3 Pt continues with pureed solids and nectar thick liquids.     Comprehension   Assist Devices Hearing Aid   Comprehension (FIM) 4 - Understands basic info/conversation 75-90% of time   Expression   Expression (FIM) 3 - Expresses basic  info/needs 50-74% of time   Social Interaction   Social Interaction (FIM) 5 - Interacts appropriately with others 90% of time   Problem Solving   Problem solving (FIM) 3 - Solves basic problmes 50-74% of time   Memory   Memory (FIM) 3 - Recognizes, recalls/performs 50-74%       Familiar low tech AAC board in room completed with written direction of 1 item at a time F9-12 items @ 30% silvia increased to 100% mod-max cue.     Recommendations   Diet Solid Recommendation Level 1 Dysphagia/pureed    Diet Liquid Recommendation Nectar thick liquid  (10cc provalve cup or single straw sips controlled by staff or wife who has been trained.) - can have water only via 5CC cup or single straw controlled sips by staff or wife who has been trained.    Recommended Form of Meds Crushed;With puree   General Precautions Aspiration precautions;Minimize distractions;Upright as possible for all oral intake;Remain upright for 45 mins after meals;Supervision with meals   Compensatory Swallowing Strategies Alternate solids and liquids;External pacing  (small bites, slow rate, cue to double swallow. Try to keep meals under 1 hour due to fatigue as meals progres

## 2024-02-21 NOTE — PCC OCCUPATIONAL THERAPY
2/21/24: Pt new admit to ARU. Current LOF listed above. Barriers to d/c include decreased strength throughout, decreased balance, impaired cognition including safety awareness, problem solving, attention, comprehension, expression, and short term memory, and decreased activity tolerance; all affect independence in self care and fxl transfers. Pt will participate in ADL training, therapeutic exercises and activities, fxl mobility/transfers, cognitive training, and activity tolerance in order to progress towards goals. Pt would benefit from continued skilled OT services in order to address listed barriers and prepare for safe d/c.     2/28/24: Pt's current LOF listed above. Continues with barriers to d/c of decreased strength throughout, decreased balance, impaired cognition including safety awareness, problem solving, attention, comprehension, expression, and short term memory, and decreased activity tolerance; all affect independence in self care and fxl transfers. Pt participating in ADL training, therapeutic exercises and activities, fxl mobility/transfers, cognitive training, and activity tolerance in order to progress towards goals. Pt would benefit from continued skilled OT services in order to address listed barriers and prepare for safe d/c.      3/6/24: Pt's current LOF listed above. Continues with barriers to d/c of decreased strength throughout, decreased balance, impaired cognition including safety awareness, problem solving, attention, comprehension, expression, and short term memory, and decreased activity tolerance; all affect independence in self care and fxl transfers. Pt participating in ADL training, therapeutic exercises and activities, fxl mobility/transfers, cognitive training, and activity tolerance in order to progress towards goals. Pt would benefit from continued skilled OT services in order to address listed barriers and prepare for safe d/c.     3/13/24: Pt's current LOF listed above.  Continues with barriers to d/c of decreased strength throughout, decreased balance, impaired cognition including safety awareness, problem solving, attention, comprehension, expression, and short term memory, and decreased activity tolerance; all affect independence in self care and fxl transfers. Pt participating in ADL training, therapeutic exercises and activities, fxl mobility/transfers, cognitive training, and activity tolerance in order to progress towards goals. Pt would benefit from continued skilled OT services in order to address listed barriers and prepare for safe d/c.     3/20/24: Pt's current LOF listed above. Continues with barriers to d/c of decreased strength throughout, decreased balance, impaired cognition including safety awareness, problem solving, attention, comprehension, expression, and short term memory, and decreased activity tolerance; all affect independence in self care and fxl transfers. Pt participating in ADL training, therapeutic exercises and activities, fxl mobility/transfers, cognitive training, and activity tolerance in order to progress towards goals. Pt would benefit from continued skilled OT services in order to address listed barriers and prepare for safe d/c.     3/27/24: Pt's current LOF listed above. Continues with barriers to d/c of decreased strength throughout, decreased balance, impaired cognition including safety awareness, problem solving, attention, comprehension, expression, and short term memory, and decreased activity tolerance; all affect independence in self care and fxl transfers. Pt participating in ADL training, therapeutic exercises and activities, fxl mobility/transfers, cognitive training, and activity tolerance in order to progress towards goals. Pt would benefit from continued skilled OT services in order to address listed barriers and prepare for safe d/c.    4/3/24: Pt's current LOF listed above. Continues with barriers to d/c of decreased strength  throughout, decreased balance, impaired cognition including safety awareness, problem solving, attention, comprehension, expression, and short term memory, and decreased activity tolerance; all affect independence in self care and fxl transfers. Pt participating in ADL training, therapeutic exercises and activities, fxl mobility/transfers, cognitive training, and activity tolerance in order to progress towards goals. Pt would benefit from continued skilled OT services in order to address listed barriers and prepare for safe d/c.

## 2024-02-21 NOTE — NURSING NOTE
Patient resting in bed at this time.  No signs of distress noted.  Patient deaf able to shake head yes/no to answer questions written on whiteboard.  Patient was incontinent of urine several times overnight PVR bladder scan was elevated and hospitalist implemented urinary retention protocol.  Patient required two straight cath procedures overnight as per the protocol.  Patient high fall risk and continual observation continues overnight as ordered.  Bed alarm in place.  Patient was assisted to reposition frequently to promote skin integrity.  Bilateral heel and sacral mepilex dressings in place for protection.  Call bell within reach.  Plan of care ongoing.

## 2024-02-21 NOTE — NURSING NOTE
Patient unable to void and bladder scanned for 640. Straight cath for 450 ml. Will continue to monitor.

## 2024-02-21 NOTE — NURSING NOTE
Patient at baseline per family is non verbal and barely speaks. Able to answer yes and no per wife but shaking head is not reliable Unable to fully assess patient. Inconsistent with yes and no. Unable to assess pain after attempting different pain scales. Transfers assist x 1 with walker. Stands min assist. Generalized weakness noted. Needs direct supervision with meals. Nectar thick liquids ordered. Patient coughs with any cup or straw accept slow spill cups in room at bedside. Moist non productive cough when eating. Sitting in wheelchair for all meals. Unable to urinate this shift. Will continue on urinary retention protocol. Alarms in place. Will continue to monitor and follow plan of care.

## 2024-02-21 NOTE — PROGRESS NOTES
02/21/24 0900   Patient Data   Rehab Impairment Impairment of mobility, safety, Activities of Daily Living (ADLs), and cognitive/communication skills due to Brain Dysfunction: 02.22 Traumatic, Closed Injury   Etiologic Diagnosis L SDH, R SAH, L anterior hypodensity lateral temporal lobe   Home Setup   Type of Home Multi Level  (pt ? historian; information to be confirmed with staff)   Second Floor Bathroom Shower   First Floor Setup Available   (sleeps upstairs)   Home Modifications Necessary?   (lives with wife)   Prior IADL Participation   Money Management   (wife completes)   Meal Preparation   (wife completes)   Laundry   (wife completes)   Home Cleaning   (wife completes)   Prior Level of Function   Self-Care 3. Independent - Patient completed the activities by him/herself, with or without an assistive device, with no assistance from a helper.   Functional Cognition 2. Needed Some Help - Patient needed a partial assistance from another person to complete activities.   Prior Assistance Needed for Driving;Household Chores/Cleaning;Meal Preparation;Money Management;Medication Management;Shopping   Prior Device Used   (pt unable to answer)   Patient Preference   Nickname (Patient Preference) Phillip Guzman   Psychosocial   Psychosocial (WDL) X   Patient Behaviors/Mood Flat affect   Restrictions/Precautions   Precautions Bed/chair alarms;Cognitive;Fall Risk;Hard of hearing;Impulsive;Supervision on toilet/commode  (deaf; cochlear implants at home)   Weight Bearing Restrictions No   ROM Restrictions No   Pain Assessment   Pain Assessment Tool 0-10   Pain Score No Pain   Oral Hygiene   Type of Assistance Needed Physical assistance   Physical Assistance Level 25% or less   Comment assist for thoroughness, squeeze toothpaste onto brush   Oral Hygiene CARE Score 3   Grooming   Able To Comb/Brush Hair;Wash/Dry Face;Brush/Clean Teeth   Limitation Noted In Problem Solving;Safety;Sequencing;Strength   Tub/Shower Transfer    Reason Not Assessed Sponge Bath   Shower/Bathe Self   Type of Assistance Needed Physical assistance   Physical Assistance Level 26%-50%   Comment assist for perineal/buttocks, bilat lower LE; visual and physical cues required to sequence   Shower/Bathe Self CARE Score 3   Bathing   Assessed Bath Style Sponge Bath   Anticipated D/C Bath Style Shower;Sponge Bath   Able to Gather/Transport No   Able to Wash/Rinse/Dry (body part) Left Arm;Right Arm;L Upper Leg;R Upper Leg;Chest;Abdomen   Limitations Noted in Balance;Endurance;Problem Solving;ROM;Safety;Sequencing;Strength   Positioning Seated;Standing   Dressing/Undressing Clothing   Remove UB Clothes   (hospital robe)   Don UB Clothes Pullover Shirt;Jacket   Type of Assistance Needed Physical assistance   Physical Assistance Level 51%-75%   Comment assist to don t shirt over bilat UE to elbows, pull over head, don flannel shirt over bilat UE   Upper Body Dressing CARE Score 2   Remove LB Clothes Pants;Undergarment;Socks   Don LB Clothes Pants;Undergarment;Socks   Type of Assistance Needed Physical assistance   Physical Assistance Level 76% or more   Comment able to push pants from knees to ankles when doffing   Lower Body Dressing CARE Score 2   Limitations Noted In Balance;Endurance;Problem Solving;Safety;Sequencing;Strength;ROM   Positioning Supported Sit   Putting On/Taking Off Footwear   Type of Assistance Needed Physical assistance   Physical Assistance Level 76% or more   Comment able to present bilat feet to OT to doff/don socks   Putting On/Taking Off Footwear CARE Score 2   Toileting Hygiene   Type of Assistance Needed Physical assistance   Physical Assistance Level Total assistance   Comment incontinent of urine and bowel requiring total assist to manage   Toileting Hygiene CARE Score 1   Toilet Transfer   Surface Assessed Standard Commode   Transfer Technique Stand Pivot   Limitations Noted In Balance;Endurance;Safety;Sequencing;Problem Solving;UE  Strength;LE Strength   Adaptive Equipment Walker   Type of Assistance Needed Physical assistance   Physical Assistance Level 26%-50%   Toilet Transfer CARE Score 3   Toileting   Able to Pull Clothing down no, up no.   Manage Hygiene Bladder;Bowel  (incontinent)   Limitations Noted In Balance;Problem Solving;ROM;Safety;Sequencing;UE Strength;LE Strength   Adaptive Equipment   (RW)   Roll Left and Right   Type of Assistance Needed Physical assistance   Physical Assistance Level 26%-50%   Comment able to bridge in bed when OT pulled pants down in prep for straight cath   Roll Left and Right CARE Score 3   Sit to Lying   Type of Assistance Needed Incidental touching   Comment CG   Sit to Lying CARE Score 4   Sit to Stand   Type of Assistance Needed Physical assistance   Physical Assistance Level 26%-50%   Sit to Stand CARE Score 3   Picking Up Object   Reason if not Attempted Safety concerns   Picking Up Object CARE Score 88   Comprehension   Assist Devices   (cochlear implants at baseline, however are at home)   Auditory   (whiteboard used for communication with <50% success rate)   QI: Comprehension 2. Sometimes Understands: Understands only basic conversations or simple, direct phrases. Frequently requires cues to understand   Comprehension (FIM) 2 - Understands only simple expressions or gestures (waves, hello)   Expression   QI: Expression 2. Frequently exhibits difficulty with expressing needs and ideas   Expression (FIM) 2 - Uses only simple expressions or gestures (waves, hello)   Social Interaction   Social Interaction (FIM) 2 - Interacts appropriately 25-49% of time   Problem Solving   Problem solving (FIM) 2 - Solves basic problems 25-49% of time   Memory   Memory (FIM) 2 - Recognizes, recalls/performs 25-49%  (difficulty assessing 2* impaired hearing)   RUE Assessment   RUE Assessment WFL   LUE Assessment   LUE Assessment WFL   Coordination   Movements are Fluid and Coordinated 1  (generally bradykinetic)  "  Sensation   Light Touch No apparent deficits   Propioception No apparent deficits   Cognition   Overall Cognitive Status Impaired   Arousal/Participation Alert;Cooperative   Attention Attends with cues to redirect   Orientation Level Oriented to person  (able to identify \"hospital\" with two choices; oriented to day of week and year, cues for month)   Memory Decreased recall of biographical information;Decreased short term memory;Decreased recall of recent events;Decreased recall of precautions   Following Commands Follows one step commands with increased time or repetition   Vision   Vision Comments reading glasses present at bedside   Perception   Motor Planning   (very infrequent cues to use ADL items correctly)   Discharge Information   Patient's Discharge Plan return home with wife   Impressions Pt seen this date for OT IE following hospitalization for intracranial hemorrhage. Barriers to d/c include decreased strength throughout, decreased balance, impaired cognition including safety awareness, problem solving, attention, comprehension, expression, and short term memory, and decreased activity tolerance; all affect independence in self care and fxl transfers. Pt would benefit from continued skilled OT services in order to address listed barriers and prepare for safe d/c.   OT Therapy Minutes   OT Time In 0900   OT Time Out 1027   OT Total Time (minutes) 87   OT Mode of treatment - Individual (minutes) 87     "

## 2024-02-21 NOTE — CASE MANAGEMENT
Initial assessment & orientation to [facility] with Pt & his wife, who was visiting bedside. Pt had difficulty hearing this worker, but his wife expressed that she is in agreement with tentative team recommendations. Tx team meeting was held today & tentative target DC date is 3/8, with home health services (all services). Discussed role of team members & reviewed TX & DC planning with Pt & Pt's spouse, who expressed understanding & agreement. Pt's additional family also visiting today & also present when physician assessed Pt.  SW will continue to monitor & assist as needed with TX & DC planning.

## 2024-02-21 NOTE — NURSING NOTE
Pt arrived to unit from [facility]. Pt alert and oriented to person and place. Wife and patient oriented to therapy schedule and call bell system. Pt deaf. Able to use urinal if placed by staff. Plan of care ongoing.

## 2024-02-21 NOTE — PROGRESS NOTES
"[facility] SLP INITIAL EVALUATION     02/21/24 1230   Patient Data   Rehab Impairment History of Presenting Illness Chart Review: \"Thomas Chase is a(n) 83 y.o. year old male who was admitted to acute rehabilitation following left frontal SAH after an unwitnessed fall at the hospital.     Patient was initially admitted to the hospital on 2/13/2024 with generalized weakness and debility.  While in the hospital patient experienced an unwitnessed fall in the bathroom on 2/16/2024.  Patient CT scan of the head showed left-sided acute SAH on 2/16/2024.      Patient is now admitted to acute rehabilitation to receive extensive PT OT and SLP as per physiatrist.  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  Patient is also experiencing urinary obstruction requiring straight catheterization.\"   Support System   Name Gina Steward wife   Multiple Support Systems   (present at bedside during session today was wife, son 2 grandchildren.)   Baseline Information   Transportation Family/friends drive   Prior IADL Participation   Money Management   (wife completes)   Meal Preparation   (wife completes)   Laundry   (wife completes)   Home Cleaning   (wife completes)   Psychosocial   Psychosocial (WDL) X   Patient Behaviors/Mood Flat affect  (some resistance to participate toward the end of today's session. Resisting to point and pointing to gesture towards bed to get back and rest from his wheelchair)   Restrictions/Precautions   Precautions Aspiration;Bed/chair alarms;Cognitive;Fall Risk;[place];Impulsive;Supervision on toilet/commode;Hard of hearing   Pain Assessment   Pain Assessment Tool 0-10   Maciel-Baker FACES Pain Rating   (Patient unable to follow direction to complete FACES scale. Inconsistent with yes/no yes/no head nod and verbal response and even despite provided visual cue.)   Pain Rating: FLACC (Rest) - Face 0   Pain Rating: FLACC (Rest) " "- Legs 0   Pain Rating: FLACC (Rest) - Activity 0   Pain Rating: FLACC (Rest) - Cry 0   Pain Rating: FLACC (Rest) - Consolability 0   Score: FLACC (Rest) 0   Eating Assessment   Bedside Swallow Results Yes   VBS Study Results Yes  (see note)   Adaptive Equipment Other  (provalve cups. 10CC brown cup for nectar thick liquids, 5CC blue cup for water only)   Food To Mouth Yes  (fed himself with incidental touch at times to  the spoon due to inattention. Suspect fatigue set up following 75% intake and remainder yogurt provided by SLP with 100% acceptance.)   Positioning Out of Bed   Safety Needs Increase Time;Cues   Meal Assessed Lunch   QI: Swallowing/Nutritional Status Mechanical Diet   Current Diet Dysphia II;Nectar Thick, baseline diet - dysphagia level 2 solids/thin liquids   Intake Mode PO   Finishes Timely No   Opens Packages No   Findings Pt assessed with pureed meat, mashed potatoes and yogurt. Assist for set up and visual/incidental touch cue to reach for spoon. Once cue provided he fed himself indepenently across 75% of his meal tray. Same cueing provided for holding provalve cup with handles. He resisted against self feeding following 75% intake and 100% acceptance of a 4oz yogurt provided by SLP with 100% acceptance by opening his mouth upon each bite. He presented with coughing >75% of trials with nectar thick liquids via straw and controlled cup sips. He was unable to follow written direction for single sips. There were no s.s of aspiration with with water via 5CC blue provalve cup as well as nectar thick liquids via 10CC bron provalve cup. Cough x2 with pureed solids. Trialed Kepra with RN present and he did well mixed with nectar thick liquids via tsp. He did also do well with water via teaspoon as well which nystatin could be provided by. He is not following direction to \"swish\" liquid.   Type of Assistance Needed Adaptive equipment;Set-up / clean-up;Supervision;Verbal cues;Incidental touching "   Physical Assistance Level 51%-75%   Eating CARE Score 2   Comprehension   Assist Devices Other  (cochlear implant in place for session)   Auditory   (whiteboard used for communication 1-3 short phrases provided at a time- inconsistent comprehension)   Expression   Non-Verbal Basic  (Pt with only yes/no verbal response on occasion in session and otherwise unintelligible jargon)   Social Interaction   Social Interaction (FIM) 2 - Interacts appropriately 25-49% of time   Cognition   Overall Cognitive Status Impaired   Arousal/Participation Alert   Orientation Level Oriented to person   Following Commands   (written 1 step directions - inconsistent. Simple yes/no question in context or pertaining to self - inconsistent verbal response/head nod response. No attempt to point in a F2 option picture/word.)   Comments pending cognitive-linguistic assessment   Discharge Information   Patient's Discharge Plan return home with wife   Patient's Rehab Expectations unable to state at this time   SLP Therapy Minutes   SLP Time In 1230   SLP Time Out 1400   SLP Total Time (minutes) 90   SLP Mode of treatment - Individual (minutes) 90   SLP Mode of treatment - Concurrent (minutes) 0   SLP Mode of treatment - Group (minutes) 0   SLP Mode of treatment - Co-treat (minutes) 0   SLP Mode of Treatment - Total time(minutes) 90 minutes   SLP Cumulative Minutes 90   Cumulative Minutes   Cumulative therapy minutes 181

## 2024-02-21 NOTE — PLAN OF CARE
Problem: PAIN - ADULT  Goal: Verbalizes/displays adequate comfort level or baseline comfort level  Description: Interventions:  - Encourage patient to monitor pain and request assistance  - Assess pain using appropriate pain scale  - Administer analgesics based on type and severity of pain and evaluate response  - Implement non-pharmacological measures as appropriate and evaluate response  - Consider cultural and social influences on pain and pain management  - Notify physician/advanced practitioner if interventions unsuccessful or patient reports new pain  Outcome: Progressing     Problem: Prexisting or High Potential for Compromised Skin Integrity  Goal: Skin integrity is maintained or improved  Description: INTERVENTIONS:  - Identify patients at risk for skin breakdown  - Assess and monitor skin integrity  - Assess and monitor nutrition and hydration status  - Monitor labs   - Assess for incontinence   - Turn and reposition patient  - Assist with mobility/ambulation  - Relieve pressure over bony prominences  - Avoid friction and shearing  - Provide appropriate hygiene as needed including keeping skin clean and dry  - Evaluate need for skin moisturizer/barrier cream  - Collaborate with interdisciplinary team   - Patient/family teaching  - Consider wound care consult   Outcome: Progressing

## 2024-02-21 NOTE — H&P
Matthew Chase#  :[id number] M  MRN:[id number]    [facility]:[id number]  Adm Date: 2024  3:13 PM   ATT PHY: Darwin Paz Md  [facility]         Chief Complaint:   Subarachnoid hemorrhage    History of Presenting Illness: Thomas Chase is a(n) 83 y.o. year old male who was admitted to acute rehabilitation following left frontal SAH after an unwitnessed fall at the hospital.    Patient was initially admitted to the hospital on 2024 with generalized weakness and debility.  While in the hospital patient experienced an unwitnessed fall in the bathroom on 2024.  Patient CT scan of the head showed left-sided acute SAH on 2024.     Patient is now admitted to acute rehabilitation to receive extensive PT OT and SLP as per physiatrist.  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  Patient is also experiencing urinary obstruction requiring straight catheterization.    Patient examined at bedside.  Patient denied any active suicidal homicidal ideations.    No Known Allergies    Current Facility-Administered Medications on File Prior to Encounter   Medication Dose Route Frequency Provider Last Rate Last Admin    [DISCONTINUED] acetaminophen (TYLENOL) tablet 975 mg  975 mg Oral Q8H Shannen Dominguez PA-C   975 mg at 24    [DISCONTINUED] docusate sodium (COLACE) capsule 100 mg  100 mg Oral BID Virgil Jimenez MD   100 mg at 24    [DISCONTINUED] enoxaparin (LOVENOX) subcutaneous injection 30 mg  30 mg Subcutaneous Q12H [facility] Constantine Quan MD   30 mg at 24    [DISCONTINUED] levETIRAcetam (KEPPRA) oral solution 500 mg  500 mg Oral Q12H [facility] Shannen Dominguez PA-C   500 mg at 24    [DISCONTINUED] loratadine (CLARITIN) tablet 10 mg  10 mg Oral Daily Virgil Jimenez MD   10 mg at 24 0814    [DISCONTINUED] melatonin tablet 6 mg  6 mg Oral  HS Virgil Jimenez MD   6 mg at 02/19/24 2017    [DISCONTINUED] multivitamin-minerals (CENTRUM) tablet 1 tablet  1 tablet Oral Daily Virgil Jimenez MD   1 tablet at 02/20/24 0928    [DISCONTINUED] ondansetron (ZOFRAN) injection 4 mg  4 mg Intravenous Q6H PRN Virgil Jimenez MD        [DISCONTINUED] pravastatin (PRAVACHOL) tablet 40 mg  40 mg Oral Daily With Dinner Virgil Jimenez MD   40 mg at 02/19/24 1648    [DISCONTINUED] senna (SENOKOT) tablet 8.6 mg  1 tablet Oral HS Virgil Page Jimenez MD   8.6 mg at 02/19/24 2017    [DISCONTINUED] sertraline (ZOLOFT) tablet 25 mg  25 mg Oral Daily Virgil Jimenez MD   25 mg at 02/20/24 0928     Current Outpatient Medications on File Prior to Encounter   Medication Sig Dispense Refill    acetaminophen (TYLENOL) 325 mg tablet Take 2 tablets (650 mg total) by mouth every 6 (six) hours as needed for mild pain  0    Multiple Vitamin (MULTIVITAMIN) tablet Take by mouth daily       Multiple Vitamins-Minerals (PRESERVISION AREDS 2 PO) Take by mouth      simvastatin (ZOCOR) 20 mg tablet Take 1 tablet (20 mg total) by mouth daily at bedtime 90 tablet 2    loratadine (CLARITIN) 10 mg tablet Take 1 tablet (10 mg total) by mouth daily (Patient not taking: Reported on 2/13/2024) 30 tablet 3       Active Ambulatory Problems     Diagnosis Date Noted    Hypercholesterolemia 07/12/2018    Borderline hypertension 07/12/2018    Negative depression screening 09/26/2019    Overweight (BMI 25.0-29.9) 01/27/2021    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    Right hand pain     Carpal tunnel syndrome on right     Hygroma 04/26/2022    Primary osteoarthritis of left knee 05/17/2022    SDH (subdural hematoma) (HCC) 05/27/2022    Hearing loss 04/08/2009    Constipation 06/02/2022    Moderate protein-calorie malnutrition (HCC) 06/19/2022    Hypotension 06/19/2022    Diastolic dysfunction 06/19/2022    EKG abnormalities 06/19/2022    Abnormal echocardiogram 06/27/2022    Right bundle-branch block 06/27/2022     Balance disorder 06/28/2022    Sensorineural hearing loss (SNHL), bilateral 08/18/2022    Macular degeneration, age related 02/27/2023    Traumatic subdural hemorrhage with loss of consciousness of unspecified duration, initial encounter (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    Dysphagia 11/21/2023    Debility 02/13/2024    Severe protein-calorie malnutrition (HCC) 02/14/2024    Abnormal CT scan, lumbar spine 02/15/2024    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    Bilateral lower extremity weakness 02/17/2024    UTI (urinary tract infection) 02/18/2024     Resolved Ambulatory Problems     Diagnosis Date Noted    Encounter for general adult medical examination without abnormal findings 03/20/2019    Medicare annual wellness visit, subsequent 03/20/2019    Overweight with body mass index (BMI) of 26 to 26.9 in adult 08/06/2019    Urinary retention 06/02/2022    Acute respiratory failure with hypoxia ([facility]) 02/27/2023     Past Medical History:   Diagnosis Date    Arthritis     Fall 11/03/2022    Hyperlipidemia     Macular degeneration, wet ([facility])        Past Surgical History:   Procedure Laterality Date    BRAIN HEMATOMA EVACUATION Left 05/28/2022    Procedure: left CRANIOTOMY FOR SUBDURAL HEMATOMA;  Surgeon: Chris Watts MD;  Location: [facility];  Service: Neurosurgery    CARPAL TUNNEL RELEASE      HERNIA REPAIR Right     inguinal    IR CEREBRAL ANGIOGRAPHY / INTERVENTION  06/02/2022    UT COCHLEAR DEVICE IMPLANTATION W/WO MASTOIDECTOMY Left 1/17/2023    Procedure: LEFT COCHLEAR IMPLANTATION WITH MASTOIDECTOMY AND FACIAL NERVE MONITORING WITH AUDIOMETRIC TESTING OF THE IMPLANT;  Surgeon: Susie Tuttle MD;  Location: [facility];  Service: ENT    UT NEUROPLASTY &/TRANSPOS MEDIAN NRV CARPAL TUNNE Right 09/20/2021    Procedure: RELEASE CARPAL TUNNEL;  Surgeon: Bruno Segovia MD;  Location: [facility];  Service: Orthopedics       Social History:   Social History     Socioeconomic History     Marital status: /Civil Union     Spouse name: None    Number of children: None    Years of education: None    Highest education level: None   Occupational History    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[employer]   Tobacco Use    Smoking status: Former    Smokeless tobacco: Never    Tobacco comments:     Smoked as a teenager   Vaping Use    Vaping status: Never Used   Substance and Sexual Activity    Alcohol use: Not Currently     Alcohol/week: 3.0 standard drinks of alcohol     Types: 3 Cans of beer per week    Drug use: No    Sexual activity: Not Currently   Other Topics Concern    None   Social History Narrative    None     Social Determinants of Health     Financial Resource Strain: Low Risk  (9/1/2022)    Overall Financial Resource Strain (CARDIA)     Difficulty of Paying Living Expenses: Not hard at all   Food Insecurity: No Food Insecurity (2/20/2024)    Hunger Vital Sign     Worried About Running Out of Food in the Last Year: Never true     Ran Out of Food in the Last Year: Never true   Transportation Needs: No Transportation Needs (2/20/2024)    PRAPARE - Transportation     Lack of Transportation (Medical): No     Lack of Transportation (Non-Medical): No   Physical Activity: Not on file   Stress: Not on file   Social Connections: Not on file   Intimate Partner Violence: Not on file   Housing Stability: Low Risk  (2/20/2024)    Housing Stability Vital Sign     Unable to Pay for Housing in the Last Year: No     Number of Places Lived in the Last Year: 1     Unstable Housing in the Last Year: No       Family History:   Family History   Problem Relation Age of Onset    Cancer Mother     Hyperlipidemia Father     Coronary artery disease Father        Review of Systems   Genitourinary:  Positive for difficulty urinating.   Musculoskeletal:  Positive for arthralgias and gait problem.   Neurological:  Positive for weakness.   All other systems reviewed and are negative.      Physical Exam   Vitals: Blood pressure 150/75, pulse  92, temperature 98.7 °F (37.1 °C), temperature source Temporal, resp. rate 17, height 6' (1.829 m), weight 57.5 kg (126 lb 12.2 oz), SpO2 98%.,Body mass index is 17.19 kg/m².  Constitutional: Awake and Alert. Well-developed and well-nourished. No distress.   HENT: PERR,EOMI, conjunctiva normal  Head: Normocephalic and atraumatic.   Mouth/Throat: Oropharynx is clear and moist.    Eyes: Conjunctivae and EOM are normal. Pupils are equal, round, and reactive to light. Right and left eye exhibits no discharge.  Neck: Neck supple. No tracheal deviation present. No thyromegaly present.   Cardiovascular: Normal rate, regular rhythm and normal heart sounds.  Exam reveals no friction rub. No murmur heard.  Pulmonary/Chest: Effort normal and breath sounds normal. No respiratory distress. She has no wheezes.   Abdominal: Soft. Bowel sounds are normal. She exhibits no distension. There is no tenderness. There is no rebound and no guarding.   Neurological: Cranial Nerves grossly intact. No sensory deficit. Coordination normal.   Musculoskeletal:   Nontender spine  Skin: Skin is warm and dry. No rash noted. No diaphoresis. No erythema. No edema. No cyanosis.    Assessment     Thomas Chase is a(n) 83 y.o. year old male with left frontal subarachnoid hemorrhage after a fall 2/16/2024    1.  Cardiac with history of hypertension and dyslipidemia.  Patient is currently on pravastatin 40 mg daily.  2.  Allergic rhinitis.  Patient is on loratadine 10 mg daily.  3.  Depression and anxiety.  Patient is on Zoloft 25 mg daily.  4.  Insomnia.  Patient is on melatonin 6 mg at bedtime.  5.  DJD/osteoarthritis.  Patient is getting Tylenol 975 mg every 8 hours.  6.  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  Patient is mainly nonverbal.  7.  New obstructive uropathy.  Patient is requiring a straight catheterization.  May need Finley's catheter for now.  I will consult urology for further evaluation and treatment.  8.  Thrush.   I will treat the patient with nystatin liquid swish and spit or swallow.  9.  Dizziness/giddiness.  Patient may get meclizine as needed.  10.  Swallowing difficulties.  Speech has recommended dysphagia modified consistency diet with puréed nectar thick liquid.  11.  Pain and bowel management.  As per physiatrist.  12.  Left frontal subarachnoid hemorrhage status post fall.  Patient will be receiving extensive PT OT as per physiatrist.    Prognosis: Fair.    Discharge Plan: In progress.    PCP.  Yadira Perry PA-C    Advanced Directives: I have discussed in detail the patient the advanced directives.  Patient has appointed his son and daughter as his POA.  Patient's son is Alexy Chase and his phone number is [phone] and patient's daughters name is Allegra Chase and her phone number is [phone].  Patient has a living will with advanced healthcare directives.  When discussing cardiac and pulmonary resuscitation efforts with the patient, the patient wishes to be DNR/DNI.    I have spent more than 50 minutes gathering data, doing physical examination, and discussing the advanced directives, which was witnessed by caring staff.

## 2024-02-21 NOTE — PROGRESS NOTES
02/21/24 0645   Patient Data   Rehab Impairment Impairment of mobility, safety, Activities of Daily Living (ADLs), and cognitive/communication skills due to Brain Dysfunction:  02.22  Traumatic, Closed Injury   Etiologic Diagnosis L SDH, R SAH, L anterior hypodensity lateral temporal lobe   Support System   Name Oni Fuentes Alison   Relationship spouse, son, and dtr   Home Setup   Type of Home Single Level  (per screen; patient nodded in agreement when questioned)   Method of Entry Stairs  (per chart review; patient unable to answer when asked)   Number of Stairs 1   Number of Stairs in Home   (per chart review, patient lives in single story home and has first floor set up)   First Floor Setup Available Yes   Available Equipment Roller Walker;Wheelchair  (per patient; will verify)   Prior Level of Function   Indoor-Mobility (Ambulation) 3. Independent - Patient completed the activities by him/herself, with or without an assistive device, with no assistance from a helper.  (per fco review; patient also states he was using walker)   Stairs 3. Independent - Patient completed the activities by him/herself, with or without an assistive device, with no assistance from a helper.  (per chart review)   Prior Device Used D. Walker  (per patient)   Restrictions/Precautions   Precautions Aspiration;Bed/chair alarms;Cognitive;Fall Risk;Hard of hearing;Impulsive;Supervision on toilet/commode  (wears cochlear implants; implants currently at home)   Pain Assessment   Pain Assessment Tool 0-10   Pain Score No Pain   Transfer Bed/Chair/Wheelchair   Positioning Concerns Cognition  (patient deaf/hard of hearing; wears cochlear implants at home)   Limitations Noted In Balance;Coordination;Endurance;Problem Solving;LE Strength  (cognition, hard of hearing/deaf)   Adaptive Equipment Roller Walker   Type of Assistance Needed Physical assistance;Verbal cues   Physical Assistance Level 51%-75%   Comment mod A with max visual/written  and tactile cues   Chair/Bed-to-Chair Transfer CARE Score 2   Roll Left and Right   Type of Assistance Needed Physical assistance  (visual/tactile/written cues)   Physical Assistance Level 26%-50%   Comment min A using bed rails; also able to bridge to assist with donning/doffing pull up and pants   Roll Left and Right CARE Score 3   Sit to Lying   Type of Assistance Needed Physical assistance;Verbal cues   Physical Assistance Level 26%-50%   Comment min A with max visual/written/tactile cues   Sit to Lying CARE Score 3   Lying to Sitting on Side of Bed   Type of Assistance Needed Physical assistance  (visual/written/tactile cues)   Physical Assistance Level 26%-50%   Comment min A with max visual/written/tactile cues   Lying to Sitting on Side of Bed CARE Score 3   Sit to Stand   Type of Assistance Needed Physical assistance  (visual/written/tactile cues)   Physical Assistance Level 51%-75%   Comment mod A with RW and max cues   Sit to Stand CARE Score 2   Picking Up Object   Reason if not Attempted Safety concerns   Picking Up Object CARE Score 88   Car Transfer   Reason if not Attempted Environmental limitations   Car Transfer CARE Score 10   Ambulation   Primary Mode of Locomotion Prior to Admission Walk   Distance Walked (feet) 66 ft  (66' 61')   Assist Device Roller Walker   Gait Pattern Inconsistant Maria Victoria;Slow Maria Victoria;Decreased foot clearance;Narrow ILIANA;Improper weight shift;Forward Flexion   Limitations Noted In Balance;Coordination;Device Management;Endurance;Posture;Safety;Strength   Provided Assistance with: Balance   Walk Assist Level Moderate Assist;Chair Follow   Findings mod A x 1 level and unlevel surfaces with walker; second person needed for wheelchair follow; increased visual/written/ tactile cues   Walk 10 Feet   Type of Assistance Needed Physical assistance  (visual/written/tactile cues)   Physical Assistance Level Total assistance   Comment mod A x 1 level and unlevel surfaces with walker;  second person needed for wheelchair follow; increased visual/written/ tactile cues   Walk 10 Feet CARE Score 1   Walk 50 Feet with Two Turns   Type of Assistance Needed Physical assistance  (visual/written/tactile cues)   Physical Assistance Level Total assistance   Comment mod A x 1 level and unlevel surfaces with walker; second person needed for wheelchair follow; increased visual/written/ tactile cues   Walk 50 Feet with Two Turns CARE Score 1   Walk 150 Feet   Reason if not Attempted Safety concerns   Walk 150 Feet CARE Score 88   Walking 10 Feet on Uneven Surfaces   Type of Assistance Needed Physical assistance;Verbal cues  (visual/written/tactile cues)   Physical Assistance Level Total assistance   Comment mod A x 1 level and unlevel surfaces with walker; second person needed for wheelchair follow; increased visual/written/ tactile cues   Walking 10 Feet on Uneven Surfaces CARE Score 1   Wheelchair mobility   Type of Wheelchair Used 1. Manual   Method Right upper extremity;Left upper extremity;Right lower extremity;Left lower extremity   Assistance Provided For Locking Brakes;Obstacles;Remove Leg Rest;Replace Leg Rest;Remove armrests;Replace armrests   Distance Level Surface (feet) 133 ft   Distance Wheeled 3% Grade 12 ft   Findings max A with max visual/written/tactile cues   Wheel 50 Feet with Two Turns   Type of Assistance Needed Physical assistance  (visual/written/tactile cues)   Physical Assistance Level 76% or more   Comment max A with max visual/written/tactile cues   Wheel 50 Feet with Two Turns CARE Score 2   Wheel 150 Feet   Comment declined further wheelchair mobility   Reason if not Attempted Refused to perform   Wheel 150 Feet CARE Score 7   Curb or Single Stair   Reason if not Attempted Safety concerns   1 Step (Curb) CARE Score 88   4 Steps   Reason if not Attempted Safety concerns   4 Steps CARE Score 88   12 Steps   Reason if not Attempted Safety concerns   12 Steps CARE Score 88   Stairs    Findings TBA as able; has 1 HATTIE per chart review and first floor setup   Comprehension   Assist Devices   (wears cochlear implants; however they are currently at home)   Auditory   (using white board and yes/no questions; inconsistent with responses)   QI: Comprehension 2. Sometimes Understands: Understands only basic conversations or simple, direct phrases. Frequently requires cues to understand   Comprehension (FIM) 2 - Understands only simple expressions or gestures (waves, hello)   Expression   QI: Expression 2. Frequently exhibits difficulty with expressing needs and ideas   Expression (FIM) 2 - Uses only simple expressions or gestures (waves, hello)   Social Interaction   Social Interaction (FIM) 2 - Interacts appropriately 25-49% of time   Problem Solving   Problem solving (FIM) 2 - Solves basic problems 25-49% of time   Memory   Memory (FIM) 2 - Recognizes, recalls/performs 25-49%  (difficult to asses due to impaired hearing)   RLE Assessment   RLE Assessment   (ROM WFL; difficult to assess strength due to cognition and impaired hearing; grossly 3 to 3+/5)   LLE Assessment   LLE Assessment   (ROM WFL; difficult to assess strength due to cognition and impaired hearing; grossly 3+ to 4-/5)   Coordination   Movements are Fluid and Coordinated 1   Sensation   Light Touch No apparent deficits   Propioception No apparent deficits   Cognition   Overall Cognitive Status Impaired   Arousal/Participation Alert;Cooperative   Attention Attends with cues to redirect   Orientation Level Oriented to person   Memory Decreased recall of biographical information;Decreased short term memory;Decreased recall of recent events;Decreased recall of precautions   Following Commands Follows one step commands with increased time or repetition   Vision   Vision Comments reading glasses   Therapeutic Exercise   Therapeutic Exercise/Activity seated ther ex with increased visual and tactile cues   Discharge Information   Vocational Plan  Retired/not working   Patient's Discharge Plan per chart review; return home with family   Patient's Rehab Expectations unable to state   Barriers to Discharge Home Decreased Cognitive Function;Decreased Strength;Decreased Endurance;Safety Considerations   Impressions Patient seen today for IE.  Presents, following hospitalization for ICH and PMH significant for previous fall with TBI (SDH with L craniotomy), chronic aphasia, hard of hearing with cochlear implants in place, and hyperlipidemia; all affecting functional mobility as stated above in respective sections.   Limited by decreased ROM/strength, decreased balance and safety, decreased endurance, impaired cognition, and impaired hearing.  May benefit from continued inpatient [facility] PT to increase function, safety, and independence in prep for safe d/c to home with family and continued PT services as needed.   PT Therapy Minutes   PT Time In 0645   PT Time Out 0816   PT Total Time (minutes) 91   PT Mode of treatment - Individual (minutes) 91   PT Mode of treatment - Concurrent (minutes) 0   PT Mode of treatment - Group (minutes) 0   PT Mode of treatment - Co-treat (minutes) 0   PT Mode of Treatment - Total time(minutes) 91 minutes   PT Cumulative Minutes 91   Cumulative Minutes   Cumulative therapy minutes 91

## 2024-02-21 NOTE — NURSING NOTE
Patient straight cathed for 200 ml and urine sample sent to lab. Unable to get anymore urine out. Bladder scan again for 301 ml. Per Dr. Clayton can remove brooks and scan again in a few hours. Will continue to monitor.

## 2024-02-21 NOTE — NUTRITION
24 1557   Biochemical Data,Medical Tests, and Procedures   Biochemical Data/Medical Tests/Procedures Lab values reviewed;Meds reviewed   Labs (Comment)  creat:0.55, B.  A1c:6.0%, specific gravity: >1.050.  pro:6.3   Meds (Comment) lovenox, keppra, claritin, antivert, melatonin, centrum, pravachol, zoloft   Speech Therapy Recommendations (Comment) Speech therapy ordered.   Nutrition-Focused Physical Exam   Nutrition-Focused Physical Exam Findings RN skin assessment reviewed  (Wound right pretibial, wound right elbow, wound right shoulder per documentation.)   Nutrition-Focused Physical Exam Findings Trace BLE edema. Weak right hand  (can perform some self feeding). Dysphagia with a fear of choking. Dental implants noted. Impaired vision. Deaf. Oriented to person, impulsive, expressive aphasia.   Medical-Related Concerns Arthritis     Encounter for general adult medical examination without abnormal findings    Fall   Hyperlipidemia     Macular degeneration, wet (HCC)     Urinary retention   Adequacy of Intake   Nutrition Modality PO   Feeding Route   PO Independent   Current PO Intake   Current Diet Order Dysphagia 1, NTL   Nutrition Supplements Mighty Shake  (TID)   Current Meal Intake 0-25%;25-50%;50-75%;%   Intake Supplements Other (comment)  (unknown)   Estimated calorie intake compared to estimated need Nutrient needs not met at this time.   PES Statement   Problem Intake   Oral or Nutritional Support Intake (2) Inadequate oral intake NI-2.1   Related to Swallowing difficulty;Fatigue/lethargy   As evidenced by: Intake < estimated needs;Weight loss   Recommendations/Interventions   Malnutrition/BMI Present Yes   Adult Malnutrition type Chronic illness   Adult Degree of Malnutrition Other severe protein calorie malnutrition   Malnutrition Characteristics Fat loss;Muscle loss;Inadequate energy;Weight loss   Adult BMI Classifications Underweight < 18.5   360 Statement Malnutrition  related to inadequate energy intake as evidenced by 19#(13%) weight loss from 9/8/# to 2/20/#, <75% energy intake compared to estimated needs >1 month, temporal wasting, sunken orbital, clavicle visible, subcutaneous fat loss orbital/cheek region and extremities.   Summary Consult: malnutrition. Weight change, 2-13#, poor PO; MST-2. Low BMI. Speech. RN consult. Dysphagia 1, NTL with 16 ensure supplements prescribed. Supplement changed to ensure plus high protein TID however liquids then downgraded from thin to NTL. Supplement changed to mightyshake TID. Meal completions vary. Nutrition history from wife Gina. She reports patient eats well with encouragement. Reports no therapeutic diet restrictions at home. Reports patient prefers to consume softer foods. Avoid items such as bread, toast, tough meats. Enjoys applesauce, pudding, ice cream, yogurt, oatmeal, soup, and soft cooked vegetables and potatoes. She reports he also enjoys extra sauce/gravy. Hostesses notified of food preferences. 2/20/#; 2/13/#; 12/7/#, 11#(8%) weight loss in 2 months; 9/8/#, 19#(13%) weight loss in 5 months. Significant weight loss present. Speech therapy ordered. Wound right pretibial, wound right elbow, wound right shoulder per documentation. Trace BLE edema. Weak right hand  (can perform some self feeding). Dysphagia with a fear of choking. Dental implants noted. Impaired vision. Deaf. Oriented to person, impulsive, expressive aphasia. Discussed intervention options with wife. Recommend 1. Dysphagia, 1 NTL with extra sauce/gravy. 2. Mightyshake BID. 3. Magic cup BID. 4. Vanilla yogurt, pudding and applesauce with meals. 5. Oatmeal at breakfast. Soups encouraged by  staff.   Interventions/Recommendations Continue current diet order;Supplement adjust;Supplement initiate;Other (Specify)  (provide food preferences)   Intervention Comments Recommend 1. Dysphagia, 1 NTL with extra sauce/gravy.  2. Mightyshake BID. 3. Magic cup BID. 4. Vanilla yogurt, pudding and applesauce with meals. 5. Oatmeal at breakfast. Soups encouraged by  staff.   Education Assessment   Education Patient/caregiver not appropriate for education at this time   Patient Nutrition Goals   Goal Avoid weight loss;Tolerate PO diet;Meet PO needs   Goal Status Initiated   Timeframe to complete goal by next f/u   Nutrition Complexity Risk   Nutrition complexity level Moderate risk   Nutrition review: 02/23/24  (tolerating nutrition interventions?)   Follow up date 02/26/24

## 2024-02-21 NOTE — NURSING NOTE
Patient unable to void after lunch. Bladder scan for 519 ml. Dr. Clayton made aware and he spoke with wife. 4th scan above parameters. Per Dr. Clayton continue timed voids for next 24 hours and straight cath as needed. Flomax to be added and urology consult ordered.

## 2024-02-21 NOTE — PROGRESS NOTES
Physical Medicine and Rehabilitation Progress Note  Thomas Chase 83 y.o. male MRN: [id number]  Unit/Bed#: [facility] 213-02 Encounter: [id number]    Etiology/Reason for Admission: Impairment of mobility, safety, Activities of Daily Living (ADLs), and cognitive/communication skills due to Brain Dysfunction:  02.22  Traumatic, Closed Injury    Interval History: Seen face-to-face at bedside. His wife is present with him today. No acute events overnight. Discussed with team this morning that 1:1 reported no safety or significant impulsivity concerns overnight. His wife is bringing in his cochlear implant devices while he remains on the ARC. Vital signs reviewed, stable and WNL. Bowels are regular. He has required 2 straight catheterization today for urinary retention, first for 450 cc and then this afternoon for 100 cc, with bladder scanning discrepancy from 300 to 500 cc post-cath. Unclear if these are real volumes as PO fluid intake has not been high. Discussed risks/benefits with continued intermittent straight cath versus indwelling urinary catheter placement with patient and his wife who elected for short-term continued straight cath attempts. Interdisciplinary team meeting held today to discuss progress and barriers with respect to disposition. Separate note with pertinent items will be available.    Assessment/Plan - Continue plan of care as per primary attending, unless otherwise stated below:      Rehab - Continue PT, OT, and SLP  Bowel - Patient reports no constipation  Bladder - Discussed with patient, patient's wife and RN today. Plan for 24 more hours of timed voids with intermittent straight cath attempts. RN describing meeting some resistance with distal end of catheter, some concern for BPH and possible sphincter dyssynergia as well. Started tamsulosin 0.4 mg daily today, IM placed consulted for urology for evaluation as well. Unclear etiology for retention, possibly medication related, could be rare effect  of Keppra versus hypomobility-induced, was recently treated for UTI, however will re-check U/A as well. Will follow closely for next 24 hours, may require indwelling catheter placement.    Skin -  Encourage regular turning as patient at risk for skin breakdown  Staff to continue patient education on Q2h turning  Rehabilitation team to perform skin checks regularly  Pain - Continue current pain regimen  HTN - Stable, continue anti-hypertensives as per IM  ICH - Keppra for seizure prophylaxis, monitor neurologic examination and outpatient follow-up with neurosurgery   At risk for falls - discontinue 1:1 today, will maintain room proximity to RN station, 4 side rails, behavior log at night, maintain good sleep/wake cycle, avoid overstimulation, will obtain cochlear implant devices to improve hearing and interaction/connection with environment  Disposition - plan for discharge to home with home PT, OT, SLP and RN on 3/8/24    Daniel Clayton MD  Attending Physician  Physical Medicine and Rehabilitation  [facility]    Review of Systems:  A 10 point ROS was performed; negative except as noted above.       Current Facility-Administered Medications:     acetaminophen (TYLENOL) tablet 975 mg, 975 mg, Oral, Q8H, Alexandrea Lugo MD, 975 mg at 02/21/24 1341    enoxaparin (LOVENOX) subcutaneous injection 40 mg, 40 mg, Subcutaneous, Q24H [facility], Alexandrea Lugo MD, 40 mg at 02/21/24 0822    levETIRAcetam (KEPPRA) oral solution 500 mg, 500 mg, Oral, Q12H [facility], Alexandrea Lugo MD, 500 mg at 02/21/24 1253    loratadine (CLARITIN) tablet 10 mg, 10 mg, Oral, Daily, Amada L Dagnall, PA-C, 10 mg at 02/21/24 0822    meclizine (ANTIVERT) tablet 25 mg, 25 mg, Oral, Q8H PRN, Amada L Dagnall, PA-C    melatonin tablet 6 mg, 6 mg, Oral, HS, Amada L Dagnall, PA-C, 6 mg at 02/20/24 2129    multivitamin-minerals (CENTRUM) tablet 1 tablet, 1 tablet, Oral, Daily, Amada L Dagnall, PA-C, 1 tablet at 02/21/24  0822    nystatin (MYCOSTATIN) oral suspension 500,000 Units, 500,000 Units, Swish & Swallow, 4x Daily, Darwin Paz MD    pravastatin (PRAVACHOL) tablet 40 mg, 40 mg, Oral, Daily With Dinner, Amada LEE Zeenat, PA-C, 40 mg at 02/20/24 1957    sertraline (ZOLOFT) tablet 25 mg, 25 mg, Oral, Daily, Amada SANGEETHA Dagnalsangeetha, PA-C, 25 mg at 02/21/24 0822    tamsulosin (FLOMAX) capsule 0.4 mg, 0.4 mg, Oral, Daily With Dinner, Daniel Clayton MD    Physical Exam:  Temp:  [98.7 °F (37.1 °C)] 98.7 °F (37.1 °C)  HR:  [92] 92  Resp:  [17] 17  BP: (150)/(75) 150/75  SpO2:  [98 %] 98 %    GEN: Patient seen resting comfortably in hospital bed, NAD  HEENT: NCAT; mucous membranes moist  CV: No extremity edema  PULM: Breathing comfortably on room air  ABD: Non-distended  SKIN: No obvious rashes or lesions on exposed skin  NEURO:  Awake and alert, very hard of hearing, able to follow simple commands with clear consistency  Does not produce spontaneous speech today  Moving all extremities spontaneously in bed    Laboratory:  Labs reviewed  Results from last 7 days   Lab Units 02/20/24  0937 02/18/24  0632 02/17/24  0421   HEMOGLOBIN g/dL 13.0 12.9 13.1   HEMATOCRIT % 40.5 39.9 40.8   WBC Thousand/uL 6.88 13.56* 9.95     Results from last 7 days   Lab Units 02/20/24  0937 02/18/24  0632 02/17/24  0421 02/16/24  0329   BUN mg/dL 25 21 15 14   SODIUM mmol/L 138 137 136 135   POTASSIUM mmol/L 4.2 4.3 4.4 4.0   CHLORIDE mmol/L 103 101 100 100   CREATININE mg/dL 0.55* 0.53* 0.55* 0.53*   AST U/L  --   --   --  36   ALT U/L  --   --   --  33            Diagnostic Studies: Reviewed, no new imaging   No orders to display       ** Please Note: Fluency Direct voice to text software may have been used in the creation of this document. **    I have spent a total time of 60 minutes on 02/21/24 in caring for this patient including Diagnostic results, Instructions for management, Patient and family education, Importance of tx compliance, Impressions,  Counseling / Coordination of care, Documenting in the medical record, Reviewing / ordering tests, medicine, procedures  , Obtaining or reviewing history  , and Communicating with other healthcare professionals .

## 2024-02-21 NOTE — MALNUTRITION/BMI
This medical record reflects one or more clinical indicators suggestive of malnutrition and/or morbid obesity.    Malnutrition Findings:   Adult Malnutrition type: Chronic illness  Adult Degree of Malnutrition: Other severe protein calorie malnutrition  Malnutrition Characteristics: Fat loss, Muscle loss, Inadequate energy, Weight loss              360 Statement: Malnutrition related to inadequate energy intake as evidenced by 19#(13%) weight loss from 9/8/# to 2/20/#, <75% energy intake compared to estimated needs >1 month, temporal wasting, sunken orbital, clavicle visible, subcutaneous fat loss orbital/cheek region and extremities.    Recommend 1. Dysphagia 1, NTL with extra sauce/gravy.  2. Mightyshake BID. 3. Magic cup BID. 4. Vanilla yogurt, pudding and applesauce with meals. 5. Oatmeal at breakfast. Soups encouraged by  staff.     BMI Findings:  Adult BMI Classifications: Underweight < 18.5        Body mass index is 17.19 kg/m².     See Nutrition note dated 2/21/2024 for additional details.  Completed nutrition assessment is viewable in the nutrition documentation.

## 2024-02-22 LAB
BACTERIA BLD CULT: NORMAL
BACTERIA BLD CULT: NORMAL

## 2024-02-22 PROCEDURE — 0T9B70Z DRAINAGE OF BLADDER WITH DRAINAGE DEVICE, VIA NATURAL OR ARTIFICIAL OPENING: ICD-10-PCS | Performed by: UROLOGY

## 2024-02-22 PROCEDURE — 97116 GAIT TRAINING THERAPY: CPT | Performed by: PHYSICAL THERAPIST

## 2024-02-22 PROCEDURE — 92526 ORAL FUNCTION THERAPY: CPT

## 2024-02-22 PROCEDURE — 97535 SELF CARE MNGMENT TRAINING: CPT

## 2024-02-22 PROCEDURE — 99233 SBSQ HOSP IP/OBS HIGH 50: CPT | Performed by: PHYSICAL MEDICINE & REHABILITATION

## 2024-02-22 PROCEDURE — 97110 THERAPEUTIC EXERCISES: CPT | Performed by: PHYSICAL THERAPIST

## 2024-02-22 PROCEDURE — 92523 SPEECH SOUND LANG COMPREHEN: CPT

## 2024-02-22 PROCEDURE — 97530 THERAPEUTIC ACTIVITIES: CPT | Performed by: PHYSICAL THERAPIST

## 2024-02-22 PROCEDURE — 97530 THERAPEUTIC ACTIVITIES: CPT

## 2024-02-22 RX ADMIN — ACETAMINOPHEN 975 MG: 325 TABLET ORAL at 21:12

## 2024-02-22 RX ADMIN — LEVETIRACETAM 500 MG: 100 SOLUTION ORAL at 09:10

## 2024-02-22 RX ADMIN — ACETAMINOPHEN 975 MG: 325 TABLET ORAL at 13:11

## 2024-02-22 RX ADMIN — SERTRALINE HYDROCHLORIDE 25 MG: 25 TABLET ORAL at 09:10

## 2024-02-22 RX ADMIN — NYSTATIN 500000 UNITS: 100000 SUSPENSION ORAL at 17:08

## 2024-02-22 RX ADMIN — ENOXAPARIN SODIUM 40 MG: 40 INJECTION SUBCUTANEOUS at 09:10

## 2024-02-22 RX ADMIN — Medication 1 TABLET: at 09:10

## 2024-02-22 RX ADMIN — NYSTATIN 500000 UNITS: 100000 SUSPENSION ORAL at 21:12

## 2024-02-22 RX ADMIN — NYSTATIN 500000 UNITS: 100000 SUSPENSION ORAL at 09:10

## 2024-02-22 RX ADMIN — LORATADINE 10 MG: 10 TABLET ORAL at 09:10

## 2024-02-22 RX ADMIN — NYSTATIN 500000 UNITS: 100000 SUSPENSION ORAL at 11:43

## 2024-02-22 RX ADMIN — ACETAMINOPHEN 975 MG: 325 TABLET ORAL at 05:00

## 2024-02-22 RX ADMIN — Medication 6 MG: at 21:12

## 2024-02-22 RX ADMIN — PRAVASTATIN SODIUM 40 MG: 40 TABLET ORAL at 17:08

## 2024-02-22 RX ADMIN — LEVETIRACETAM 500 MG: 100 SOLUTION ORAL at 21:12

## 2024-02-22 RX ADMIN — TAMSULOSIN HYDROCHLORIDE 0.4 MG: 0.4 CAPSULE ORAL at 17:08

## 2024-02-22 NOTE — NURSING NOTE
present on unit and assessed patient. Per Dr. Boyer we are to insert brooks and keep in due to high residuals. Will continue to monitor urine output.

## 2024-02-22 NOTE — CONSULTS
Consultation - Urology   Thomas Chase 83 y.o. male MRN: [id number]  Unit/Bed#: [facility] 213-02 Encounter: [id number]      Assessment/Plan      Assessment:  Urinary retention  Plan:  Continue Flomax for at least 5-7 days before repeat voiding trial. Finley to be placed due to high residuals    History of Present Illness   Attending: Darwin Paz MD  Reason for Consult / Principal Problem: retention  HPI: Thomas Chase is a 83 y.o. year old male who presents with SAH after fall. Denies prior voiding difficulties. Bladder scan PVRs over 500-600 cc with intermittent straight cath.     Inpatient consult to Urology  Consult performed by: Norberto Boyer MD  Consult ordered by: Darwin Pza MD        Review of Systems   Genitourinary:  Positive for difficulty urinating. Negative for dysuria and hematuria.       Historical Information   Past Medical History:   Diagnosis Date    Arthritis     Encounter for general adult medical examination without abnormal findings 03/20/2019    Fall 11/03/2022    Hyperlipidemia     Macular degeneration, wet (HCC)     Urinary retention 06/02/2022     Past Surgical History:   Procedure Laterality Date    BRAIN HEMATOMA EVACUATION Left 05/28/2022    Procedure: left CRANIOTOMY FOR SUBDURAL HEMATOMA;  Surgeon: Chris Watts MD;  Location:  MAIN OR;  Service: Neurosurgery    CARPAL TUNNEL RELEASE      HERNIA REPAIR Right     inguinal    IR CEREBRAL ANGIOGRAPHY / INTERVENTION  06/02/2022    AR COCHLEAR DEVICE IMPLANTATION W/WO MASTOIDECTOMY Left 1/17/2023    Procedure: LEFT COCHLEAR IMPLANTATION WITH MASTOIDECTOMY AND FACIAL NERVE MONITORING WITH AUDIOMETRIC TESTING OF THE IMPLANT;  Surgeon: Susie Tuttle MD;  Location: [facility];  Service: ENT    AR NEUROPLASTY &/TRANSPOS MEDIAN NRV CARPAL TUNNE Right 09/20/2021    Procedure: RELEASE CARPAL TUNNEL;  Surgeon: Bruno Segovia MD;  Location: [facility];  Service: Orthopedics     Social History   Social History     Substance and  "Sexual Activity   Alcohol Use Not Currently    Alcohol/week: 3.0 standard drinks of alcohol    Types: 3 Cans of beer per week     @DRUGHX  E-Cigarette/Vaping    E-Cigarette Use Never User      E-Cigarette/Vaping Substances     Social History     Tobacco Use   Smoking Status Former   Smokeless Tobacco Never   Tobacco Comments    Smoked as a teenager     Family History: non-contributory    Meds/Allergies   current meds:   Current Facility-Administered Medications   Medication Dose Route Frequency    acetaminophen (TYLENOL) tablet 975 mg  975 mg Oral Q8H    enoxaparin (LOVENOX) subcutaneous injection 40 mg  40 mg Subcutaneous Q24H JEANNIE    levETIRAcetam (KEPPRA) oral solution 500 mg  500 mg Oral Q12H JEANNIE    loratadine (CLARITIN) tablet 10 mg  10 mg Oral Daily    meclizine (ANTIVERT) tablet 25 mg  25 mg Oral Q8H PRN    melatonin tablet 6 mg  6 mg Oral HS    multivitamin-minerals (CENTRUM) tablet 1 tablet  1 tablet Oral Daily    nystatin (MYCOSTATIN) oral suspension 500,000 Units  500,000 Units Swish & Swallow 4x Daily    pravastatin (PRAVACHOL) tablet 40 mg  40 mg Oral Daily With Dinner    sertraline (ZOLOFT) tablet 25 mg  25 mg Oral Daily    tamsulosin (FLOMAX) capsule 0.4 mg  0.4 mg Oral Daily With Dinner     No Known Allergies    Objective   Vitals: Blood pressure 110/62, pulse 80, temperature 98.2 °F (36.8 °C), temperature source Temporal, resp. rate 16, height 6' (1.829 m), weight 57.5 kg (126 lb 12.2 oz), SpO2 95%.    I/O last 24 hours:  In: 220 [P.O.:220]  Out: 1650 [Urine:1650]    Invasive Devices       None                   Physical Exam  Abdominal:      General: There is no distension.      Palpations: Abdomen is soft.      Tenderness: There is no abdominal tenderness. There is no right CVA tenderness or left CVA tenderness.   Genitourinary:     Penis: Normal.       Testes: Normal.     Prostate 1+ enlarged smooth without masses    Lab Results: CBC: No results found for: \"WBC\", \"HGB\", \"HCT\", \"MCV\", \"PLT\", " "\"ADJUSTEDWBC\", \"RBC\", \"MCH\", \"MCHC\", \"RDW\", \"MPV\", \"NRBC\"  CMP: No results found for: \"SODIUM\", \"CL\", \"CO2\", \"ANIONGAP\", \"BUN\", \"CREATININE\", \"GLUCOSE\", \"CALCIUM\", \"AST\", \"ALT\", \"ALKPHOS\", \"PROT\", \"BILITOT\", \"EGFR\"  Urinalysis:   Lab Results   Component Value Date    COLORU Yellow 02/21/2024    CLARITYU Hazy 02/21/2024    SPECGRAV 1.020 02/21/2024    PHUR 6.5 02/21/2024    LEUKOCYTESUR Trace (A) 02/21/2024    NITRITE Negative 02/21/2024    GLUCOSEU Negative 02/21/2024    KETONESU Negative 02/21/2024    BILIRUBINUR Negative 02/21/2024    BLOODU 1+ (A) 02/21/2024     Imaging Studies: I have personally reviewed pertinent reports.    EKG, Pathology, and Other Studies: I have personally reviewed pertinent reports.    VTE Prophylaxis: Sequential compression device (Venodyne)     Code Status: Level 3 - DNAR and DNI  Advance Directive and Living Will:      Power of :    POLST:      Counseling / Coordination of Care  Total floor / unit time spent today 55 minutes. Greater than 50% of total time was spent with the patient and / or family counseling and / or coordination of care.  "

## 2024-02-22 NOTE — PROGRESS NOTES
Physical Medicine and Rehabilitation Progress Note  Thomas Chase 83 y.o. male MRN: [id number]  Unit/Bed#: [facility] 213-02 Encounter: [id number]    Etiology/Reason for Admission: Impairment of mobility, safety, Activities of Daily Living (ADLs), and cognitive/communication skills due to Brain Dysfunction:  02.22  Traumatic, Closed Injury    Interval History: Seen face-to-face at bedside. No acute events overnight. Remained unable to void spontaneously overnight or this morning with high PVRs and straight cath volumes of 600 and then 400 yesterday evening and early this morning, respectively. Urology came to bedside and placed indwelling urinary catheter and provided recommendations. Vital signs reviewed, stable and WNL. Bowels are regular.     Assessment/Plan - Continue plan of care as per primary attending, unless otherwise stated below:      Rehab - Continue PT, OT, and SLP  Bowel - Patient reports no constipation  Bladder - Urology consulted and now following for urinary retention. Indwelling urinary catheter placed on 2/22/24 after being unable to spontaneously void. Will consider repeat trial of void in 5-7 days (2/27/24 at this earliest) and will continue tamsulosin for the time being to allow achievement of therapeutic level. U/A was negative for UTI. Unclear etiology for retention, possibly medication related, could be rare effect of Keppra versus hypomobility-induced.   Skin -  Encourage regular turning as patient at risk for skin breakdown  Staff to continue patient education on Q2h turning  Rehabilitation team to perform skin checks regularly  Pain - Continue current pain regimen  HTN - Stable, continue anti-hypertensives as per IM  ICH - Keppra for seizure prophylaxis, monitor neurologic examination and outpatient follow-up with neurosurgery   At risk for falls - off 1:1, maintain room proximity to RN station, 4 side rails, behavior log at night, maintain good sleep/wake cycle, avoid overstimulation, will  obtain cochlear implant devices to improve hearing and interaction/connection with environment  Disposition - plan for discharge to home with home PT, OT, SLP and RN on 3/8/24    Daniel Clayton MD  Attending Physician  Physical Medicine and Rehabilitation  [facility]    Review of Systems:  A 10 point ROS was performed; negative except as noted above.       Current Facility-Administered Medications:     acetaminophen (TYLENOL) tablet 975 mg, 975 mg, Oral, Q8H, Alexandrea Lugo MD, 975 mg at 02/22/24 1311    enoxaparin (LOVENOX) subcutaneous injection 40 mg, 40 mg, Subcutaneous, Q24H JEANNIE, Alexandrea Lugo MD, 40 mg at 02/22/24 0910    levETIRAcetam (KEPPRA) oral solution 500 mg, 500 mg, Oral, Q12H JEANNIE, Alexandrea Lugo MD, 500 mg at 02/22/24 0910    loratadine (CLARITIN) tablet 10 mg, 10 mg, Oral, Daily, Amada L Dagnall, PA-C, 10 mg at 02/22/24 0910    meclizine (ANTIVERT) tablet 25 mg, 25 mg, Oral, Q8H PRN, Amada L Dagnall, PA-C    melatonin tablet 6 mg, 6 mg, Oral, HS, Amada L Dagnall, PA-C, 6 mg at 02/21/24 2126    multivitamin-minerals (CENTRUM) tablet 1 tablet, 1 tablet, Oral, Daily, Amada L Dagnall, PA-C, 1 tablet at 02/22/24 0910    nystatin (MYCOSTATIN) oral suspension 500,000 Units, 500,000 Units, Swish & Swallow, 4x Daily, Darwin Paz MD, 500,000 Units at 02/22/24 1143    pravastatin (PRAVACHOL) tablet 40 mg, 40 mg, Oral, Daily With Dinner, Amada L Dagnall, PA-C, 40 mg at 02/21/24 1737    sertraline (ZOLOFT) tablet 25 mg, 25 mg, Oral, Daily, Amada L Dagnall, PA-C, 25 mg at 02/22/24 0910    tamsulosin (FLOMAX) capsule 0.4 mg, 0.4 mg, Oral, Daily With Dinner, Daniel Clayton MD, 0.4 mg at 02/21/24 9070    Physical Exam:  Temp:  [98.2 °F (36.8 °C)-98.8 °F (37.1 °C)] 98.2 °F (36.8 °C)  HR:  [80-82] 80  Resp:  [16-17] 16  BP: (110-141)/(62-68) 110/62  SpO2:  [92 %-95 %] 95 %    GEN: Patient seen resting comfortably in hospital bed, NAD  HEENT: NCAT; mucous  membranes moist  CV: No extremity edema  PULM: Breathing comfortably on room air  ABD: Non-distended  SKIN: No obvious rashes or lesions on exposed skin  NEURO:  Awake and alert, very hard of hearing, able to follow simple commands with clear consistency  Does not produce spontaneous speech today  Moving all extremities spontaneously in bed    Laboratory:  Labs reviewed  Results from last 7 days   Lab Units 02/20/24  0937 02/18/24  0632 02/17/24  0421   HEMOGLOBIN g/dL 13.0 12.9 13.1   HEMATOCRIT % 40.5 39.9 40.8   WBC Thousand/uL 6.88 13.56* 9.95     Results from last 7 days   Lab Units 02/20/24  0937 02/18/24  0632 02/17/24  0421 02/16/24  0329   BUN mg/dL 25 21 15 14   SODIUM mmol/L 138 137 136 135   POTASSIUM mmol/L 4.2 4.3 4.4 4.0   CHLORIDE mmol/L 103 101 100 100   CREATININE mg/dL 0.55* 0.53* 0.55* 0.53*   AST U/L  --   --   --  36   ALT U/L  --   --   --  33             Latest Reference Range & Units 02/21/24 15:54   Color, UA Yellow, Straw  Yellow   Clarity, UA Hazy, Clear  Hazy   SL AMB SPECIFIC GRAVITY_URINE >1.005 - <1.030  1.020   Glucose, UA Negative mg/dl Negative   Ketones, UA Negative mg/dl Negative   Blood, UA Negative  1+ !   Nitrite, UA Negative  Negative   Leukocytes, UA Negative  Trace !   pH, UA 5.0, 5.5, 6.0, 6.5, 7.0, 7.5  6.5   POCT URINE PROTEIN Negative, Interference- unable to analyze mg/dl Negative   Bilirubin, UA Negative  Negative   SL AMB POCT UROBILINOGEN 0.2, 1.0 E.U./dl E.U./dl 0.2   RBC, UA None Seen, 2-4 /hpf 2-4   WBC, UA None Seen, 2-4, 5-60 /hpf 4-10 !   Bacteria, UA None Seen, Occasional /hpf Occasional   !: Data is abnormal    Diagnostic Studies: Reviewed, no new imaging   No orders to display       ** Please Note: Fluency Direct voice to text software may have been used in the creation of this document. **    I have spent a total time of 55 minutes on 02/22/24 in caring for this patient including Diagnostic results, Instructions for management, Patient and family  education, Importance of tx compliance, Impressions, Counseling / Coordination of care, Documenting in the medical record, Reviewing / ordering tests, medicine, procedures  , Obtaining or reviewing history  , and Communicating with other healthcare professionals .

## 2024-02-22 NOTE — QUICK NOTE
Clinical documentation addendum    Severe protein-calorie malnutrition in the context of chronic illness; evidenced by muscle wasting to clavicle and temples; subcutaneous fat los to orbital and buccal regions; significant weight loss of 13% x6 months; Treated with oral diet and nutrition supplements. BMI 17.09     Constantine Quan MD  PGY-1

## 2024-02-22 NOTE — PROGRESS NOTES
02/22/24 1330   Pain Assessment   Pain Assessment Tool 0-10   Pain Score No Pain   Restrictions/Precautions   Precautions Aspiration;Bed/chair alarms;Cognitive;Fall Risk;Brooks;Hard of hearing;Impulsive;Supervision on toilet/commode   Cognition   Overall Cognitive Status Impaired   Subjective   Subjective Patient largely non-verbal.  Reports he has to urinate (although he has a brooks in place)   Roll Left and Right   Type of Assistance Needed Physical assistance   Physical Assistance Level 26%-50%   Comment Side rails   Roll Left and Right CARE Score 3   Lying to Sitting on Side of Bed   Type of Assistance Needed Physical assistance   Physical Assistance Level 26%-50%   Comment min-mod A, to EOB   Lying to Sitting on Side of Bed CARE Score 3   Sit to Stand   Type of Assistance Needed Physical assistance   Physical Assistance Level 26%-50%   Comment min A, max demonstration for hand placement   Sit to Stand CARE Score 3   Bed-Chair Transfer   Type of Assistance Needed Physical assistance   Physical Assistance Level 26%-50%   Comment RW, MCs and visual cues for performance   Chair/Bed-to-Chair Transfer CARE Score 3   Car Transfer   Reason if not Attempted Environmental limitations   Car Transfer CARE Score 10   Walk 10 Feet   Type of Assistance Needed Physical assistance   Physical Assistance Level 51%-75%   Comment mod A, RW   Walk 10 Feet CARE Score 2   Walk 50 Feet with Two Turns   Type of Assistance Needed Physical assistance   Physical Assistance Level 51%-75%   Comment mod A, RW   Walk 50 Feet with Two Turns CARE Score 2   Walk 150 Feet   Reason if not Attempted Safety concerns   Walk 150 Feet CARE Score 88   Ambulation   Primary Mode of Locomotion Prior to Admission Walk   Distance Walked (feet) 100 ft  (100 ft x 1; 95 ft x 1; 80 ft x 1)   Assist Device Roller Walker   Does the patient walk? 2. Yes   Wheel 50 Feet with Two Turns   Type of Assistance Needed Physical assistance   Physical Assistance Level 76%  or more   Comment max A, VCs, hand over hand   Wheel 50 Feet with Two Turns CARE Score 2   Wheel 150 Feet   Reason if not Attempted Refused to perform   Wheel 150 Feet CARE Score 7   Wheelchair mobility   Does the patient use a wheelchair? 1. Yes   Type of Wheelchair Used 1. Manual   Method Right upper extremity;Left upper extremity;Right lower extremity;Left lower extremity   Curb or Single Stair   Reason if not Attempted Safety concerns   1 Step (Curb) CARE Score 88   4 Steps   Reason if not Attempted Safety concerns   4 Steps CARE Score 88   12 Steps   Reason if not Attempted Safety concerns   12 Steps CARE Score 88   Picking Up Object   Reason if not Attempted Safety concerns   Picking Up Object CARE Score 88   Toilet Transfer   Comment Finley inserted   Therapeutic Interventions   Strengthening Seated LE TE, 2# with 1:1 performance for maintaining count   Assessment   Treatment Assessment Patient presents supine in bed, sleeping soundly. Patient's family at bedside.  Patient demonstrates continued deficits related to balance, [place] requiring cues, full participation, intermittent confusion.  Patient with ongoing need for both MCs and VCs.  Cont per POC and progress as tolerated.   Problem List Decreased strength;Decreased range of motion;Decreased endurance;Impaired balance;Decreased cognition;Decreased safety awareness;Impaired judgement;Impaired hearing   PT Barriers   Physical Impairment Decreased strength;Decreased range of motion;Decreased endurance;Impaired balance;Decreased mobility;Decreased coordination;Impaired judgement;Decreased safety awareness;Impaired hearing   Functional Limitation Car transfers;Ramp negotiation;Stair negotiation;Standing;Transfers;Walking;Wheelchair management   Plan   Treatment/Interventions Functional transfer training;LE strengthening/ROM;Elevations;Therapeutic exercise;Endurance training;Cognitive reorientation;Patient/family training;Equipment eval/education;Bed mobility;Gait  training   Progress Progressing toward goals   PT Therapy Minutes   PT Time In 1330   PT Time Out 1430   PT Total Time (minutes) 60   PT Mode of treatment - Individual (minutes) 60   PT Mode of treatment - Concurrent (minutes) 0   PT Mode of treatment - Group (minutes) 0   PT Mode of treatment - Co-treat (minutes) 0   PT Mode of Treatment - Total time(minutes) 60 minutes   PT Cumulative Minutes 151     Seated LE TE, 2#    Patient remains OOB in chair, all needs in reach.  Alarm in place and activated.  Encouraged use of call bell, patient verbalizes understanding.

## 2024-02-22 NOTE — PROGRESS NOTES
Spiritual Care Progress Note    2024  Patient: Thomas Chase : 1940  Admission Date & Time: 2024 1513  MRN: [id number] CSN: [id number]      Fr Wright followed up briefly with the pt and conducted a pastoral visit. No further needs were expressed at this time.    Chaplains still remain available.       24 0800   Clinical Encounter Type   Visited With Patient   Routine Visit Follow-up

## 2024-02-22 NOTE — PROGRESS NOTES
Progress Note - Thomas Chase 83 y.o. male MRN: [id number]    Unit/Bed#: [facility] 213-02 Encounter: [id number]        Subjective:   Patient seen and examined at bedside after reviewing the chart and discussing the case with the caring staff.      Patient examined at bedside.  Wife at bedside.  Patient unable to void and has required straight caths due to high PVRs.  Urology in to see patient this morning and indwelling urinary catheter placed.  Patient to continue Flomax for 5-7 days before repeating voiding trial.     Physical Exam   Vitals: Blood pressure 110/62, pulse 80, temperature 98.2 °F (36.8 °C), temperature source Temporal, resp. rate 16, height 6' (1.829 m), weight 57.5 kg (126 lb 12.2 oz), SpO2 95%.,Body mass index is 17.19 kg/m².  Constitutional: Patient in no acute distress.  HEENT: PERR, EOMI, MMM.  Cardiovascular: Normal rate and regular rhythm.    Pulmonary/Chest: Effort normal and breath sounds normal.   Abdomen: Soft, + BS, NT.    Assessment/Plan:  Thomas Chase is a(n) 83 y.o. year old male with left SDH and right SAH.     1.  Cardiac with hx of HTN, HLD.  BP appears stable.  On pravastatin 40 mg daily (simvastatin nonformulary).  2.  Allergic rhinitis.  Patient is on loratadine 10 mg daily.  3.  Depression/anxiety.  Patient is on Zoloft 25 mg daily.  4.  Insomnia.  Patient is on melatonin 6 mg at bedtime.  5.  Thrush.  Nystatin liquid swish and swallow 4 times daily.  6.  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  Patient is mainly nonverbal.  7.  Urinary retention.  Urinary retention protocol.  Started on Flomax 0.4 mg daily 2/21/24.  Urology consulted.  Finley catheter placed 2/22/24.  Per urology, continue Flomax for 5-7 days before repeating voiding trial.   8.  Dysphagia.  Speech therapy following.  Dysphagia 1 puureed with nectar thick liquid.  9.  Pain and bowel management.  As per physiatrist.  10.  Intracranial hemorrhage.   Patient will be receiving intensive PT OT ST  as per physiatrist.    Anticipated discharge date:  Friday 3/8/24  PCP:  RONY Diaz    The patient was discussed with Dr. Paz and he is in agreement with the above note.

## 2024-02-22 NOTE — PLAN OF CARE
Problem: INFECTION - ADULT  Goal: Absence or prevention of progression during hospitalization  Description: INTERVENTIONS:  - Assess and monitor for signs and symptoms of infection  - Monitor lab/diagnostic results  - Monitor all insertion sites, i.e. indwelling lines, tubes, and drains  - Monitor endotracheal if appropriate and nasal secretions for changes in amount and color  - [place] appropriate cooling/warming therapies per order  - Administer medications as ordered  - Instruct and encourage patient and family to use good hand hygiene technique  - Identify and instruct in appropriate isolation precautions for identified infection/condition  Outcome: Progressing  Goal: Absence of fever/infection during neutropenic period  Description: INTERVENTIONS:  - Monitor WBC    Outcome: Progressing     Problem: Nutrition/Hydration-ADULT  Goal: Nutrient/Hydration intake appropriate for improving, restoring or maintaining nutritional needs  Description: Monitor and assess patient's nutrition/hydration status for malnutrition. Collaborate with interdisciplinary team and initiate plan and interventions as ordered.  Monitor patient's weight and dietary intake as ordered or per policy. Utilize nutrition screening tool and intervene as necessary. Determine patient's food preferences and provide high-protein, high-caloric foods as appropriate.     INTERVENTIONS:  - Monitor oral intake, urinary output, labs, and treatment plans  - Assess nutrition and hydration status and recommend course of action  - Evaluate amount of meals eaten  - Assist patient with eating if necessary   - Allow adequate time for meals  - Recommend/ encourage appropriate diets, oral nutritional supplements, and vitamin/mineral supplements  - Order, calculate, and assess calorie counts as needed  - Recommend, monitor, and adjust tube feedings and TPN/PPN based on assessed needs  - Assess need for intravenous fluids  - Provide specific nutrition/hydration  education as appropriate  - Include patient/family/caregiver in decisions related to nutrition  Outcome: Progressing

## 2024-02-22 NOTE — PROGRESS NOTES
02/22/24 1130   Pain Assessment   Pain Assessment Tool 0-10   Pain Score   (Asleep at my arrival, does not demonstrate any s/s of pain.  Does not indicate on yes/no board.)   Restrictions/Precautions   Precautions Aspiration;Bed/chair alarms;Cognitive;Fall Risk;Hard of hearing;Impulsive   Weight Bearing Restrictions No   ROM Restrictions No   Cognitive Linguisitic Assessments   Cognitive Linquistic Quick Test (CLQT) BCAT 8/21- pt only able to participate via white board 2' hearing loss   Comprehension   Assist Devices Other  (cochlear implants)   Auditory   (white board for communication)   QI: Comprehension 2. Sometimes Understands: Understands only basic conversations or simple, direct phrases. Frequently requires cues to understand   Comprehension (FIM) 2 - Understands only simple expressions or gestures (waves, hello)   Expression   Non-Verbal Basic   QI: Expression 2. Frequently exhibits difficulty with expressing needs and ideas   Expression (FIM) 2 - Uses only simple expressions or gestures (waves, hello)   Social Interaction   Social Interaction (FIM) 2 - Interacts appropriately 25-49% of time   Problem Solving   Problem solving (FIM) 2 - Solves basic problems 25-49% of time   Memory   Memory (FIM) 2 - Recognizes, recalls/performs 25-49%   Memory Skills   Orientation Level Oriented to person   Consistencies Assessed and Performance   Materials Admnistered Puree/Level 1;Nectar thick liquid   Oral Stage Mild impaired   Phargngeal Stage Moderate impaired   Swallow Mechanics Mild delayed;Swallow initation;Weak larygneal rise;Aspiration risk   Strategies and Efficacy small bites, small sips, slow rate   Recommendations   Risk for Aspiration Present   Recommendations Consider oral diet   Diet Solid Recommendation Level 1 Dysphagia/pureed   Diet Liquid Recommendation Nectar thick liquid   Recommended Form of Meds Whole;With thick liquid   General Precautions Aspiration precautions;Feed only when alert;Minimize  "distractions;Upright as possible for all oral intake;Remain upright for 45 mins after meals   Compensatory Swallowing Strategies Alternate solids and liquids   Results Reviewed with PT/Family/Caregiver   Eating   Type of Assistance Needed Physical assistance   Physical Assistance Level 25% or less   Eating CARE Score 3   Swallow Assessment   Swallow Treatment Assessment Patient refusing to feed self today for lunch despite encouragement.  Patient ultimately fed by therapist and meal ongoing with wife when session ended.  Pt verbalizing \"No more.\"  Pt also verbalized \"I'm done with all of this\" gesturing to his tray.  Pt automatically opening mouth when orally presented tsp though did not appear under voluntary control.  Pt with overt s/s during puree via tsp every 4-5 trials, cough with clearance and min res.  ST ongoing, extensive education provided to spouse though very little carryover session to session demonstrated.   SLP Therapy Minutes   SLP Time In 1130   SLP Time Out 1230   SLP Total Time (minutes) 60   SLP Mode of treatment - Individual (minutes) 60   SLP Mode of treatment - Concurrent (minutes) 0   SLP Mode of treatment - Group (minutes) 0   SLP Mode of treatment - Co-treat (minutes) 0   SLP Mode of Treatment - Total time(minutes) 60 minutes   SLP Cumulative Minutes 150   Therapy Time missed   Time missed? No       "

## 2024-02-22 NOTE — NURSING NOTE
Patient transfers assist times one with walker in and out of bed. Needs direction and cueing for tasks. Patient unable to answer yes or no or point to picture board. Unclear if has pain. Able to answer birthday to this nurse. Takes pills crushed in pudding. Thickened liquids continue. Coughing occasionally with meals. 16 Fr brooks catheter inserted in AM with good urine return. Tolerated procedure well. Wife at bedside for lunch.  Patient repositioned in bed and heels elevated. No attempts to get up unassisted. Will continue to monitor and follow plan of care.

## 2024-02-22 NOTE — PROGRESS NOTES
"   02/22/24 0830   Pain Assessment   Pain Assessment Tool 0-10   Pain Score   (pt nodded yes to pain, however unable to specify where or how much when prompted multiple times by OT)   Restrictions/Precautions   Precautions Aspiration;Bed/chair alarms;Cognitive;Fall Risk;Hard of hearing;Impulsive   Weight Bearing Restrictions No   ROM Restrictions No   Eating   Type of Assistance Needed Physical assistance;Adaptive equipment   Physical Assistance Level 25% or less   Comment initial scoop on spoon required for each new food, then pt able to self feed desired amounts; visual cue of provalve cup with apple juice required between each sip and encouragement to continue drinking as pt would put cup to side out of sight; pt shook head \"no\" for undesired foods and to indicate when finished with each food; ate approx 50% of puree eggs and Korean toast, 100% yogurt, 50% applesauce, 75% nectar thick apple juice   Eating CARE Score 3   Eating Assessment   Food To Mouth Yes   Noted Coughing  (coughing <25% of the time after swallows)   Positioning Upright;Out of Bed   Safety Needs Increase Time;Cues   Meal Assessed Breakfast   Intake Mode PO   Finishes Timely No   Opens Packages No   Oral Hygiene   Type of Assistance Needed Supervision   Physical Assistance Level No physical assistance   Comment visual cues to initiate oral care   Oral Hygiene CARE Score 4   Grooming   Able To Comb/Brush Hair;Wash/Dry Face;Brush/Clean Teeth   Limitation Noted In Problem Solving;Safety;Sequencing;Strength   Findings initiated hair combing when handed comb however required modA to finish   Shower/Bathe Self   Type of Assistance Needed Physical assistance   Physical Assistance Level 26%-50%   Comment assist for bilat lower LE, perineal, buttocks; pt able to mimic washing motions by OT for each body part   Shower/Bathe Self CARE Score 3   Bathing   Assessed Bath Style Sponge Bath   Anticipated D/C Bath Style Shower;Sponge Bath   Able to " Gather/Transport No   Able to Wash/Rinse/Dry (body part) Left Arm;Right Arm;L Upper Leg;R Upper Leg;Chest;Abdomen   Limitations Noted in Balance;Endurance;Problem Solving;ROM;Safety;Strength;Sequencing   Positioning Seated;Standing   Upper Body Dressing   Type of Assistance Needed Physical assistance   Physical Assistance Level 51%-75%   Comment assist to doff/don flannel shirt over bilat UE, doff/don pullover shirt over head, align shirt to initiate donning   Upper Body Dressing CARE Score 2   Lower Body Dressing   Type of Assistance Needed Physical assistance   Physical Assistance Level 76% or more   Comment able to assist in pushing pants down from knees, pulling pants over L knee prior to standing, pulling up R side of pants very slightly when standing   Lower Body Dressing CARE Score 2   Putting On/Taking Off Footwear   Type of Assistance Needed Physical assistance   Physical Assistance Level 76% or more   Comment able to present bilat feet to OT to doff/don socks   Putting On/Taking Off Footwear CARE Score 2   Dressing/Undressing Clothing   Remove UB Clothes Pullover Shirt;Jacket   Don UB Clothes Pullover Shirt;Jacket   Remove LB Clothes Pants;Undergarment;Socks   Don LB Clothes Pants;Undergarment;Socks   Limitations Noted In Balance;Endurance;Problem Solving;Safety;Sequencing;Strength;ROM   Positioning Supported Sit   Sit to Lying   Type of Assistance Needed Incidental touching   Comment CG   Sit to Lying CARE Score 4   Lying to Sitting on Side of Bed   Type of Assistance Needed Incidental touching   Comment CG   Lying to Sitting on Side of Bed CARE Score 4   Sit to Stand   Type of Assistance Needed Incidental touching   Comment CG   Sit to Stand CARE Score 4   Bed-Chair Transfer   Type of Assistance Needed Incidental touching   Comment CG with visual cues   Chair/Bed-to-Chair Transfer CARE Score 4   Transfer Bed/Chair/Wheelchair   Limitations Noted In Balance;Endurance;Problem Solving;Sequencing;LE Strength    Adaptive Equipment Roller Walker   Cognition   Overall Cognitive Status Impaired   Arousal/Participation Alert;Cooperative   Attention Attends with cues to redirect   Orientation Level Oriented to person  (oriented to all of time except month)   Memory Decreased recall of biographical information;Decreased recall of recent events;Decreased short term memory;Decreased recall of precautions   Following Commands Follows one step commands with increased time or repetition  (pt follows visual commands/mimicking effectively, ? comprehension level with written words)   Assessment   Treatment Assessment Pt seen for OT session this AM focusing on breakfast meal completion and ADL session; see respective sections for details. Pt required Brayan for eating with close supervision throughout entire meal; pt able to self feed after initial physical cues for each new food. MaxA required for LB dressing, modA UB dressing, Brayan bathing, supervision oral care; fxl transfers overall CG with visual cues. Whiteboard used to communicate throughout session, however comprehension level questionable as pt nodded yes/no inconsistently and verbalized <10% of the time to questions, mostly with dysarthritic speech. Pt cooperative during session with good sustained attention during ADL tasks. Pt's barriers to d/c include decreased strength throughout, decreased balance, impaired hearing, impaired cognition including safety awareness, problem solving, attention, comprehension, and expression, and decreased activity tolerance; all affect independence in self care and fxl transfers. Pt would benefit from continued skilled OT services in order to address listed barriers and prepare for safe d/c.   Prognosis Fair   Problem List Decreased strength;Decreased range of motion;Decreased endurance;Impaired balance;Decreased cognition;Decreased safety awareness;Impaired judgement;Impaired hearing   Plan   Treatment/Interventions ADL retraining;Functional  transfer training;LE strengthening/ROM;Therapeutic exercise;Endurance training;Cognitive reorientation;Patient/family training;Equipment eval/education;Compensatory technique education;OT   Progress Progressing toward goals   OT Therapy Minutes   OT Time In 0830   OT Time Out 1000   OT Total Time (minutes) 90   OT Mode of treatment - Individual (minutes) 90

## 2024-02-23 PROCEDURE — 97530 THERAPEUTIC ACTIVITIES: CPT

## 2024-02-23 PROCEDURE — 97116 GAIT TRAINING THERAPY: CPT

## 2024-02-23 PROCEDURE — 97537 COMMUNITY/WORK REINTEGRATION: CPT

## 2024-02-23 PROCEDURE — 92526 ORAL FUNCTION THERAPY: CPT | Performed by: NURSE PRACTITIONER

## 2024-02-23 PROCEDURE — 97535 SELF CARE MNGMENT TRAINING: CPT

## 2024-02-23 PROCEDURE — 97110 THERAPEUTIC EXERCISES: CPT

## 2024-02-23 RX ADMIN — Medication 1 TABLET: at 09:02

## 2024-02-23 RX ADMIN — NYSTATIN 500000 UNITS: 100000 SUSPENSION ORAL at 14:04

## 2024-02-23 RX ADMIN — SERTRALINE HYDROCHLORIDE 25 MG: 25 TABLET ORAL at 09:02

## 2024-02-23 RX ADMIN — NYSTATIN 500000 UNITS: 100000 SUSPENSION ORAL at 17:12

## 2024-02-23 RX ADMIN — NYSTATIN 500000 UNITS: 100000 SUSPENSION ORAL at 21:51

## 2024-02-23 RX ADMIN — ACETAMINOPHEN 975 MG: 325 TABLET ORAL at 21:50

## 2024-02-23 RX ADMIN — ENOXAPARIN SODIUM 40 MG: 40 INJECTION SUBCUTANEOUS at 09:03

## 2024-02-23 RX ADMIN — PRAVASTATIN SODIUM 40 MG: 40 TABLET ORAL at 17:12

## 2024-02-23 RX ADMIN — LORATADINE 10 MG: 10 TABLET ORAL at 09:02

## 2024-02-23 RX ADMIN — ACETAMINOPHEN 975 MG: 325 TABLET ORAL at 14:04

## 2024-02-23 RX ADMIN — TAMSULOSIN HYDROCHLORIDE 0.4 MG: 0.4 CAPSULE ORAL at 17:12

## 2024-02-23 RX ADMIN — ACETAMINOPHEN 975 MG: 325 TABLET ORAL at 05:59

## 2024-02-23 RX ADMIN — NYSTATIN 500000 UNITS: 100000 SUSPENSION ORAL at 09:03

## 2024-02-23 RX ADMIN — Medication 6 MG: at 21:50

## 2024-02-23 NOTE — PLAN OF CARE
Problem: PAIN - ADULT  Goal: Verbalizes/displays adequate comfort level or baseline comfort level  Description: Interventions:  - Encourage patient to monitor pain and request assistance  - Assess pain using appropriate pain scale  - Administer analgesics based on type and severity of pain and evaluate response  - Implement non-pharmacological measures as appropriate and evaluate response  - Notify physician/advanced practitioner if interventions unsuccessful or patient reports new pain  Outcome: Progressing     Problem: SAFETY ADULT  Goal: Patient will remain free of falls  Description: INTERVENTIONS:  - Educate patient/family on patient safety including physical limitations  - Instruct patient to call for assistance with activity   - Consult OT/PT to assist with strengthening/mobility   - Keep Call bell within reach  - Keep bed low and locked with side rails adjusted as appropriate  - Keep care items and personal belongings within reach  - Initiate and maintain comfort rounds  - Make Fall Risk Sign visible to staff  - Offer Toileting every 2 Hours, in advance of need  - Initiate/Maintain bed/chair alarm  - Apply yellow socks and bracelet for high fall risk patients  - Patient room near nurses station  Outcome: Progressing

## 2024-02-23 NOTE — NURSING NOTE
Pt transfers Ax1RW. Supervision and full feed with all meals. Wife and daughter visited most of day and assisted with feeds. Pt takes pills crushed in AS, nectar thick liquids and dysphagia diet. Pt able to communicate with white board. Finley care completed. Pt currently resting in bed, all needs met, call bell in reach.

## 2024-02-23 NOTE — PROGRESS NOTES
Progress Note - Thomas Chase 83 y.o. male MRN: [id number]    Unit/Bed#: [facility] 213-02 Encounter: [id number]        Subjective:   Patient seen and examined at bedside after reviewing the chart and discussing the case with the caring staff.      Patient examined at bedside.  Wife at bedside.  Patient has no reported acute symptoms.     Physical Exam   Vitals: Blood pressure 98/61, pulse (!) 109, temperature 97.5 °F (36.4 °C), temperature source Temporal, resp. rate 17, height 6' (1.829 m), weight 57.5 kg (126 lb 12.2 oz), SpO2 97%.,Body mass index is 17.19 kg/m².  Constitutional: Patient in no acute distress.  HEENT: PERR, EOMI, MMM.  Cardiovascular: Normal rate and regular rhythm.    Pulmonary/Chest: Effort normal and breath sounds normal.   Abdomen: Soft, + BS, NT.    Assessment/Plan:  Thomas Cahse is a(n) 83 y.o. year old male with left SDH and right SAH.     1.  Cardiac with hx of HTN, HLD.  BP appears stable.  On pravastatin 40 mg daily (simvastatin nonformulary).  2.  Allergic rhinitis.  Patient is on loratadine 10 mg daily.  3.  Depression/anxiety.  Patient is on Zoloft 25 mg daily.  4.  Insomnia.  Patient is on melatonin 6 mg at bedtime.  5.  Thrush.  Nystatin liquid swish and swallow 4 times daily.  6.  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  Patient is mainly nonverbal.  7.  Urinary retention.  Urinary retention protocol.  Started on Flomax 0.4 mg daily 2/21/24.  Urology consulted.  Finley catheter placed 2/22/24.  Per urology, continue Flomax for 5-7 days before repeating voiding trial.   8.  Dysphagia.  Speech therapy following.  Dysphagia 1 puureed with nectar thick liquid.  9.  Pain and bowel management.  As per physiatrist.  10.  Intracranial hemorrhage.   Patient will be receiving intensive PT OT ST as per physiatrist.    Anticipated discharge date:  Friday 3/8/24  PCP:  RONY Diaz    The patient was discussed with Dr. Paz and he is in agreement with the above  note.

## 2024-02-23 NOTE — PROGRESS NOTES
"   02/23/24 1130   Pain Assessment   Pain Assessment Tool 0-10   Pain Score No Pain  (Patient shaking head no and verbalizing \"no\")   Pain Rating: FLACC (Rest) - Face 0   Pain Rating: FLACC (Rest) - Legs 0   Pain Rating: FLACC (Rest) - Activity 0   Pain Rating: FLACC (Rest) - Cry 0   Pain Rating: FLACC (Rest) - Consolability 0   Score: FLACC (Rest) 0   Restrictions/Precautions   Precautions Aspiration;Bed/chair alarms;Cognitive;Fall Risk;Aphasia;Finley;Hard of hearing;Impulsive;Supervision on toilet/commode   Swallow Assessment   Swallow Treatment Assessment Pt assessed with lunch tray this date. Patient received in bed and transitioned OOB with staff for lunch tray. Pt's wife present for session. Max encouragement to feed himself with utensils; however, patient shaking head no verbalizing no and pointing to his wife saying \"you\". Resistance with any [place] encouragement to hold the utensil. Pt denied any pain regarding reasoning as to refusal for self feeding which he is able to complete. Unknown reasoning at this time and spoon trials provided by his wife. He was able to reach and drink independently from his two handled provalve cups. He seemed to have a preference for the nectar thick liquids. Oral phase characterized by adequate orientation/reception ( opened mouth willingly to all food trials) drank by himself adequately, slow but effective AP transport ( increased in time near end of meal tray), mild oral residue cleared with a liquid wash. Pharyngeal phase of the swallow characterized by reduced hyolaryngeal elevation and delay in the initiation of the swallow consistent with VBS findings. Written cue for 2 swallows provided but patient did not complete upon command ( either turning head away from the board or shaking head no). He did spontaneously complete a 2nd swallow on 1 occasion. He did gesture towards his wife to decrease rate of intake providing a finger up to \"wait\" during a moment of slowed AP " transport. He will needed 3-4 second delays between each trial to ensure adequate  oral and pharyngea clearance prior to next bite. He did more frequently alternate bites and sips independenty today. Weak cough was variable throughout meal tray across all consistencies ~5x.   SLP Therapy Minutes   SLP Time In 1130   SLP Time Out 1240   SLP Total Time (minutes) 70   SLP Mode of treatment - Individual (minutes) 70   SLP Mode of treatment - Concurrent (minutes) 0   SLP Mode of treatment - Group (minutes) 0   SLP Mode of treatment - Co-treat (minutes) 0   SLP Mode of Treatment - Total time(minutes) 70 minutes   SLP Cumulative Minutes 220

## 2024-02-23 NOTE — PROGRESS NOTES
02/23/24 1240   Pain Assessment   Pain Assessment Tool 0-10   Pain Score No Pain   Restrictions/Precautions   Precautions Aspiration;Bed/chair alarms;Fall Risk;Finley;Hard of hearing;Supervision on toilet/commode   Cognition   Overall Cognitive Status Impaired   Arousal/Participation Alert;Cooperative   Attention Attends with cues to redirect   Orientation Level Oriented X4  (using white board for orientation, was able to correctly answer place, time, and situation)   Memory Decreased recall of biographical information;Decreased short term memory;Decreased recall of recent events;Decreased recall of precautions   Following Commands Follows one step commands with increased time or repetition   Roll Left and Right   Type of Assistance Needed Physical assistance;Verbal cues   Physical Assistance Level 26%-50%   Comment min A with bed rails and max visual/tactile cues   Roll Left and Right CARE Score 3   Sit to Lying   Type of Assistance Needed Physical assistance   Physical Assistance Level 26%-50%   Comment min A with max visual/tactile cues   Sit to Lying CARE Score 3   Sit to Stand   Type of Assistance Needed Physical assistance   Physical Assistance Level 26%-50%   Comment min A with RW and max visual/tactile cues from multiple surfaces including bed, wheelchair, recliner   Sit to Stand CARE Score 3   Bed-Chair Transfer   Type of Assistance Needed Physical assistance   Physical Assistance Level 26%-50%   Comment cg/min A with RW and max visual/tactile cue   Chair/Bed-to-Chair Transfer CARE Score 3   Walk 10 Feet   Type of Assistance Needed Physical assistance   Physical Assistance Level 26%-50%   Comment cg/min A level and unlevel surfaces with RW; max visual/tactile cues   Walk 10 Feet CARE Score 3   Walk 50 Feet with Two Turns   Type of Assistance Needed Physical assistance   Physical Assistance Level 26%-50%   Comment cg/min A level and unlevel surfaces with RW; max visual/tactile cues   Walk 50 Feet with Two  Turns CARE Score 3   Walk 150 Feet   Type of Assistance Needed Physical assistance   Physical Assistance Level 26%-50%   Comment cg/min A level and unlevel surfaces with RW; max visual/tactile cues   Walk 150 Feet CARE Score 3   Walking 10 Feet on Uneven Surfaces   Type of Assistance Needed Physical assistance   Physical Assistance Level 26%-50%   Comment cg/min A level and unlevel surfaces with RW; max visual/tactile cues   Walking 10 Feet on Uneven Surfaces CARE Score 3   Ambulation   Primary Mode of Locomotion Prior to Admission Walk   Distance Walked (feet) 160 ft  (160' 10' x 2 (to and from steps in PT gym) 133')   Assist Device Roller Walker   Gait Pattern Inconsistant Maria Victoria;Slow Maria Victoria;Decreased foot clearance;Forward Flexion;Narrow ILIANA;Improper weight shift   Limitations Noted In Balance;Coordination;Device Management;Endurance;Posture;Safety;Sequencing;Strength   Provided Assistance with: Balance;Direction   Walk Assist Level Contact Guard;Minimum Assist;Chair Follow   Findings cg/min A level and unlevel surfaces with RW; max visual/tactile cues   Does the patient walk? 2. Yes   Curb or Single Stair   Style negotiated Single stair   Type of Assistance Needed Physical assistance   Physical Assistance Level Total assistance   Comment min--mod A x 2 with max visual/tactile cues for sequence and direction   1 Step (Curb) CARE Score 1   4 Steps   Type of Assistance Needed Physical assistance   Physical Assistance Level Total assistance   Comment min--mod A x 2 with max visual/tactile cues for sequence and direction   4 Steps CARE Score 1   Stairs   Type Stairs   # of Steps 7   Weight Bearing Precautions WBAT   Assist Devices Bilateral Rail   Findings min--mod A x 2 with max visual/tactile cues for sequence and direction   Therapeutic Interventions   Strengthening seated ther ex   Balance gait and transfer training   Other stair negotitation   Assessment   Treatment Assessment Patient agreeable to therapy  session.   No pain reported.   Cues needed for general safety as well as Finley management.   Patient also requires MAX cues for sequence and direction for all tasks.   Trialed steps today.  Patient able to negotiate, however required A x 2 due to increased need for visual and tactile cues sequence and direction.   Completed ther ex for general LE strengthening; gait and transfer training focusing on sequence and technique for improved balance and safety with functional mobility using walker.   PT Barriers   Physical Impairment Decreased strength;Decreased range of motion;Decreased endurance;Impaired balance;Decreased mobility;Decreased coordination;Impaired judgement;Impaired hearing;Decreased safety awareness   Functional Limitation Wheelchair management;Walking;Transfers;Standing;Stair negotiation;Ramp negotiation;Car transfers   Plan   Treatment/Interventions Functional transfer training;LE strengthening/ROM;Elevations;Therapeutic exercise;Bed mobility;Gait training   Progress Progressing toward goals   PT Therapy Minutes   PT Time In 1240   PT Time Out 1410   PT Total Time (minutes) 90   PT Mode of treatment - Individual (minutes) 90   PT Mode of treatment - Concurrent (minutes) 0   PT Mode of treatment - Group (minutes) 0   PT Mode of treatment - Co-treat (minutes) 0   PT Mode of Treatment - Total time(minutes) 90 minutes   PT Cumulative Minutes 241   Therapy Time missed   Time missed? No

## 2024-02-23 NOTE — PROGRESS NOTES
02/23/24 0700   Pain Assessment   Pain Assessment Tool 0-10   Pain Score No Pain   Restrictions/Precautions   Precautions Aspiration;Bed/chair alarms;Cognitive;Fall Risk;Finley;Hard of hearing;Supervision on toilet/commode   Weight Bearing Restrictions No   ROM Restrictions No   Eating   Type of Assistance Needed Physical assistance   Physical Assistance Level 76% or more   Comment pt able to hold provalve cup and drink unassisted, however refused to feed self even with frequent and consistent visual and physical cues from OT; maxA to scoop food and bring to mouth; episodes of coughing noted as breakfast progresses and pt coughing bits of puree eggs and Finnish toast out of mouth, switched to yogurt, pudding, and oatmeal with less coughing   Eating CARE Score 2   Eating Assessment   Adaptive Equipment   (provalve cup)   Food To Mouth   (can bring cup to mouth, not spoon this session)   Noted Coughing   Positioning Out of Bed;Upright   Safety Needs Increase Time;Cues   Meal Assessed Breakfast   Finishes Timely No   Opens Packages No   Oral Hygiene   Type of Assistance Needed Supervision   Physical Assistance Level No physical assistance   Comment visual cues for oral care thoroughness   Oral Hygiene CARE Score 4   Grooming   Able To Comb/Brush Hair;Wash/Dry Face;Brush/Clean Teeth   Limitation Noted In Problem Solving;Safety;Sequencing;Strength   Shower/Bathe Self   Type of Assistance Needed Physical assistance   Physical Assistance Level 26%-50%   Comment assist for bilat lower LE, buttocks, perineal; Finley care wipes completed by OT   Shower/Bathe Self CARE Score 3   Bathing   Assessed Bath Style Sponge Bath   Anticipated D/C Bath Style Sponge Bath;Shower   Able to Gather/Transport No   Able to Wash/Rinse/Dry (body part) Left Arm;Right Arm;R Upper Leg;L Upper Leg;Abdomen;Chest   Limitations Noted in Balance;Endurance;Problem Solving;ROM;Safety;Sequencing;Strength   Positioning Seated;Standing   Upper Body Dressing    Type of Assistance Needed Physical assistance   Physical Assistance Level 51%-75%   Comment assist to doff t-shirt over head, don new t-shirt over bilat UE to elbows and pull over head, don new flannel shirt over bilat UE   Upper Body Dressing CARE Score 2   Lower Body Dressing   Type of Assistance Needed Physical assistance   Physical Assistance Level 76% or more   Comment able to assist in pushing pants and underwear down over knees when seated; physical and visual cues from OT to attempt to initiate donning/pulling over buttocks when donning, however pt with no initiation   Lower Body Dressing CARE Score 2   Putting On/Taking Off Footwear   Type of Assistance Needed Physical assistance   Physical Assistance Level 76% or more   Comment able to present bilat feet to OT to doff/don socks   Putting On/Taking Off Footwear CARE Score 2   Dressing/Undressing Clothing   Remove UB Clothes Pullover Shirt;Jacket   Don UB Clothes Pullover Shirt;Jacket   Remove LB Clothes Pants;Undergarment;Socks   Don LB Clothes Pants;Undergarment;Socks  (OT offered shoes, however pt declined)   Limitations Noted In Balance;Endurance;Problem Solving;Safety;Sequencing;Strength;ROM   Positioning Supported Sit   Lying to Sitting on Side of Bed   Type of Assistance Needed Physical assistance   Physical Assistance Level 25% or less   Lying to Sitting on Side of Bed CARE Score 3   Sit to Stand   Type of Assistance Needed Physical assistance   Physical Assistance Level 25% or less   Sit to Stand CARE Score 3   Bed-Chair Transfer   Type of Assistance Needed Physical assistance   Physical Assistance Level 25% or less   Chair/Bed-to-Chair Transfer CARE Score 3   Transfer Bed/Chair/Wheelchair   Limitations Noted In Balance;Endurance;Problem Solving;Sequencing;LE Strength   Adaptive Equipment Roller Walker   Toileting Hygiene   Comment 500mL emptied from Finley by OT   Cognition   Overall Cognitive Status Impaired   Arousal/Participation  "Alert;Cooperative   Attention Attends with cues to redirect   Orientation Level Oriented X4  (pt oriented to all aspects of time, reported \"[place]\" for place, reported \"fell\" for situation; OT used whiteboard to ask orientation questions and pt verbalized)   Memory Decreased recall of biographical information;Decreased recall of recent events;Decreased short term memory;Decreased recall of precautions   Following Commands Follows one step commands with increased time or repetition   Additional Activities   Additional Activities Comments OT attempted card matching activity prior to breakfast, however pt unable to sequence and complete even with max visual cues and written instructions on whiteboard from OT   Assessment   Treatment Assessment Pt agreeable to OT session this AM. Received lying supine in bed. ADL session completed; current LOF and details listed in respective sections. Pt is overall maxA LB dressing, modA UB dressing, Brayan bathing, supervision oral care; visual and physical cues provided by OT throughout in attempts to initiate more participation in tasks, however pt with noted difficulty sequencing and attending to tasks. Pt required increased assist for breakfast tray completion this session and consistently declined attempts to feed self; maxA for feeding overall. Coughing noted as fatigue increased. Whiteboard used to communicate; comprehension continues to be questionable, however successfully Ox4 when using whiteboard with OT. Pt's barriers to d/c include decreased strength throughout, decreased balance, impaired hearing, impaired cognition including safety awareness, problem solving, attention, comprehension, and expression, and decreased activity tolerance; all affect independence in self care and fxl transfers. Pt would benefit from continued skilled OT services in order to address listed barriers and prepare for safe d/c.   Prognosis Fair   Problem List Decreased strength;Decreased range of " motion;Decreased endurance;Impaired balance;Decreased cognition;Decreased safety awareness;Impaired judgement;Impaired hearing   Plan   Treatment/Interventions ADL retraining;Functional transfer training;LE strengthening/ROM;Therapeutic exercise;Endurance training;Cognitive reorientation;Patient/family training;Equipment eval/education;Compensatory technique education;OT   Progress Progressing toward goals   OT Therapy Minutes   OT Time In 0700   OT Time Out 0835   OT Total Time (minutes) 95   OT Mode of treatment - Individual (minutes) 95

## 2024-02-23 NOTE — NUTRITION
02/23/24 1633   Biochemical Data,Medical Tests, and Procedures   Biochemical Data/Medical Tests/Procedures Lab values reviewed;Meds reviewed   Labs (Comment) No new labs   Adequacy of Intake   Nutrition Modality PO   Feeding Route   PO With assistance   Current PO Intake   Current Diet Order Dysphagia 1, NTL. extra sauce/gravy   Nutrition Supplements Mighty Shake;Magic Cup  (mightyshake TID, magic cup BID)   Current Meal Intake 50-75%;%   Intake Supplements Other (comment)  (unknown)   Estimated calorie intake compared to estimated need Intake showing some imrprovement. Nutrient needs better met.   Recommendations/Interventions   Summary Dysphagia 1, NTL. B: oatmeal, yogurt, applesauce, pudding. L: yogurt, applesauce, pudding. D: yogurt, applesauce, pudding. Extra sauce/gravy with meals. Mightyshake TID, Magic cup BID. Supervision with meals. Most recent meal completions, % showing improvement. Supplement intakes unknown at this time. No new weight.   Interventions/Recommendations Continue current diet order;Supplement continue;Other (Specify)  (continue specialty ordered food items)   Nutrition Complexity Risk   Nutrition complexity level Moderate risk   Nutrition review: 02/26/24   Follow up date 02/26/24        Home

## 2024-02-24 PROCEDURE — 97530 THERAPEUTIC ACTIVITIES: CPT

## 2024-02-24 PROCEDURE — 97116 GAIT TRAINING THERAPY: CPT

## 2024-02-24 PROCEDURE — 97535 SELF CARE MNGMENT TRAINING: CPT

## 2024-02-24 PROCEDURE — 97112 NEUROMUSCULAR REEDUCATION: CPT

## 2024-02-24 PROCEDURE — 97110 THERAPEUTIC EXERCISES: CPT

## 2024-02-24 RX ORDER — POLYETHYLENE GLYCOL 3350 17 G/17G
17 POWDER, FOR SOLUTION ORAL DAILY PRN
Status: DISCONTINUED | OUTPATIENT
Start: 2024-02-24 | End: 2024-04-05 | Stop reason: HOSPADM

## 2024-02-24 RX ORDER — POLYETHYLENE GLYCOL 3350 17 G/17G
17 POWDER, FOR SOLUTION ORAL ONCE
Status: COMPLETED | OUTPATIENT
Start: 2024-02-24 | End: 2024-02-24

## 2024-02-24 RX ADMIN — Medication 1 TABLET: at 09:11

## 2024-02-24 RX ADMIN — NYSTATIN 500000 UNITS: 100000 SUSPENSION ORAL at 09:10

## 2024-02-24 RX ADMIN — SERTRALINE HYDROCHLORIDE 25 MG: 25 TABLET ORAL at 09:10

## 2024-02-24 RX ADMIN — LORATADINE 10 MG: 10 TABLET ORAL at 09:10

## 2024-02-24 RX ADMIN — TAMSULOSIN HYDROCHLORIDE 0.4 MG: 0.4 CAPSULE ORAL at 17:22

## 2024-02-24 RX ADMIN — NYSTATIN 500000 UNITS: 100000 SUSPENSION ORAL at 17:22

## 2024-02-24 RX ADMIN — PRAVASTATIN SODIUM 40 MG: 40 TABLET ORAL at 17:22

## 2024-02-24 RX ADMIN — NYSTATIN 500000 UNITS: 100000 SUSPENSION ORAL at 12:29

## 2024-02-24 RX ADMIN — NYSTATIN 500000 UNITS: 100000 SUSPENSION ORAL at 21:20

## 2024-02-24 RX ADMIN — POLYETHYLENE GLYCOL 3350 17 G: 17 POWDER, FOR SOLUTION ORAL at 09:12

## 2024-02-24 RX ADMIN — ACETAMINOPHEN 975 MG: 325 TABLET ORAL at 13:27

## 2024-02-24 RX ADMIN — ACETAMINOPHEN 975 MG: 325 TABLET ORAL at 21:20

## 2024-02-24 RX ADMIN — ACETAMINOPHEN 975 MG: 325 TABLET ORAL at 05:06

## 2024-02-24 RX ADMIN — Medication 6 MG: at 21:20

## 2024-02-24 RX ADMIN — ENOXAPARIN SODIUM 40 MG: 40 INJECTION SUBCUTANEOUS at 09:10

## 2024-02-24 NOTE — QUICK NOTE
PMR Quick Note    Reviewed chart, bowel/bladder habits. Last BM 2/21. Give miralax today and added Prn miralax. Otherwise continue current rehab plan of care.    Ashley de Padua, MD  Physical Medicine and Rehabilitation

## 2024-02-24 NOTE — PROGRESS NOTES
"OT Daily Progress       02/24/24 1220   Pain Assessment   Pain Assessment Tool 0-10   Pain Score No Pain   Eating   Type of Assistance Needed Physical assistance;Verbal cues   Physical Assistance Level 76% or more   Eating CARE Score 2   Cognition   Overall Cognitive Status Impaired   Arousal/Participation Alert;Responsive;Cooperative   Attention Attends with cues to redirect   Following Commands Follows one step commands with increased time or repetition   Assessment   Treatment Assessment Pt seen for brief follow up visit. Pt awke, alert. OOB in recliner for lunch meal. Pt's spouse present and is assisting with self feeding. Pt continues to drink from provalve cup independently between bites. Pt's spouse completing hand over hand feeding and then will give patient break with spouse feeding patient max A.. Pt continues to shake head \"no\" when encouraging patient to complete self feeding. Pt left OOB in recliner with spouse present to complete meal. Needs in reach. Mamie fair. cooperative with encouragement. Will continue to benefit from OT to max I and safety with self care prior to discharge.   OT Therapy Minutes   OT Time In 1220   OT Time Out 1238   OT Total Time (minutes) 18   OT Mode of treatment - Individual (minutes) 18       "

## 2024-02-24 NOTE — PLAN OF CARE
Problem: INFECTION - ADULT  Goal: Absence or prevention of progression during hospitalization  Description: INTERVENTIONS:  - Assess and monitor for signs and symptoms of infection  - Monitor lab/diagnostic results  - Monitor all insertion sites, i.e. indwelling lines, tubes, and drains  - Monitor endotracheal if appropriate and nasal secretions for changes in amount and color  - [place] appropriate cooling/warming therapies per order  - Administer medications as ordered  - Instruct and encourage patient and family to use good hand hygiene technique  - Identify and instruct in appropriate isolation precautions for identified infection/condition  Outcome: Progressing     Problem: SAFETY ADULT  Goal: Patient will remain free of falls  Description: INTERVENTIONS:  - Educate patient/family on patient safety including physical limitations  - Instruct patient to call for assistance with activity   - Consult OT/PT to assist with strengthening/mobility   - Keep Call bell within reach  - Keep bed low and locked with side rails adjusted as appropriate  - Keep care items and personal belongings within reach  - Initiate and maintain comfort rounds  - Make Fall Risk Sign visible to staff  - Offer Toileting every 2 Hours, in advance of need  - Initiate/Maintain bed/chair alarm  - Apply yellow socks and bracelet for high fall risk patients  - Patient room near nurses station  Outcome: Progressing

## 2024-02-24 NOTE — PROGRESS NOTES
02/24/24 1030   Pain Assessment   Pain Assessment Tool 0-10   Restrictions/Precautions   Precautions Aspiration;Bed/chair alarms;Fall Risk;Finley;Hard of hearing;Supervision on toilet/commode   Weight Bearing Restrictions No   ROM Restrictions No   Cognition   Overall Cognitive Status Impaired   Arousal/Participation Alert;Cooperative   Attention Attends with cues to redirect   Orientation Level Other (Comment);Oriented X4   Memory Decreased recall of biographical information;Decreased short term memory;Decreased recall of recent events;Decreased recall of precautions   Following Commands Follows one step commands with increased time or repetition   Sit to Stand   Type of Assistance Needed Physical assistance   Physical Assistance Level 25% or less   Sit to Stand CARE Score 3   Bed-Chair Transfer   Type of Assistance Needed Physical assistance   Physical Assistance Level 25% or less   Chair/Bed-to-Chair Transfer CARE Score 3   Transfer Bed/Chair/Wheelchair   Limitations Noted In Confidence;Balance;Coordination;Problem Solving   Adaptive Equipment Roller Walker   Sit Pivot Contact Guard   Stand Pivot Contact Guard   Sit to Stand Contact Guard   Walk 10 Feet   Type of Assistance Needed Physical assistance   Physical Assistance Level 25% or less   Walk 10 Feet CARE Score 3   Walk 50 Feet with Two Turns   Type of Assistance Needed Physical assistance   Physical Assistance Level 25% or less   Walk 50 Feet with Two Turns CARE Score 3   Walk 150 Feet   Type of Assistance Needed Physical assistance   Physical Assistance Level 25% or less   Walk 150 Feet CARE Score 3   Walking 10 Feet on Uneven Surfaces   Type of Assistance Needed Incidental touching   Physical Assistance Level 25% or less   Walking 10 Feet on Uneven Surfaces CARE Score 3   Ambulation   Primary Mode of Locomotion Prior to Admission Walk   Distance Walked (feet) 150 ft  (120,100)   Assist Device Roller Walker   Gait Pattern Decreased foot  clearance;Inconsistant Maria Victoria   Limitations Noted In Balance;Device Management;Endurance;Posture;Safety;Speed;Strength;Swing   Provided Assistance with: Balance;Direction   Does the patient walk? 2. Yes   Therapeutic Interventions   Strengthening Seated and standing TE with multiple cues   Balance gait and transfer training   Assessment   Treatment Assessment Pt completes therapy consisting of gait training, transfer training, community re-integration, and seated/standing TE all with focus on increasing LE strength, balance, endurance, activity tolerance,  and increasing safety and independence with functional mobility tasks. Pt needed cueing while completing transfer training today for correct sequencing and safe technique.  Pt continues to make good progress towards therapy goals and will benefit from additional skilled PT to maximize safety and LOF with mobility tasks.   Problem List Decreased strength;Decreased range of motion;Decreased endurance;Impaired balance;Decreased cognition;Decreased safety awareness;Impaired judgement;Impaired hearing   Barriers to Discharge Inaccessible home environment;Decreased caregiver support   PT Barriers   Physical Impairment Decreased strength;Decreased range of motion;Decreased endurance;Impaired balance;Decreased mobility;Decreased coordination;Impaired judgement;Impaired hearing;Decreased safety awareness   Functional Limitation Walking;Transfers;Standing;Stair negotiation;Ramp negotiation;Car transfers   Plan   Treatment/Interventions Elevations;Therapeutic exercise;Endurance training;Cognitive reorientation;Patient/family training;LE strengthening/ROM;Functional transfer training;Equipment eval/education;Bed mobility;Gait training;Compensatory technique education;Continued evaluation   Progress Progressing toward goals   PT Therapy Minutes   PT Time In 1030   PT Time Out 1200   PT Total Time (minutes) 90   PT Mode of treatment - Individual (minutes) 90   Therapy Time  missed   Time missed? No

## 2024-02-24 NOTE — PROGRESS NOTES
OT Daily Progress/ADL       02/24/24 0700   Pain Assessment   Pain Assessment Tool 0-10   Pain Score No Pain  (shakes no when pain questioned via white board)   Restrictions/Precautions   Precautions Aspiration;Bed/chair alarms;Fall Risk;Brooks;Hard of hearing;Supervision on toilet/commode   Weight Bearing Restrictions No   ROM Restrictions No   Eating   Type of Assistance Needed Physical assistance   Physical Assistance Level 51%-75%   Comment manages provalve cup independently. Max A hand over hand for scooping food and bringing to mouth. Some minimal coughing present towards end of meal.   Eating CARE Score 2   Oral Hygiene   Type of Assistance Needed Supervision   Physical Assistance Level No physical assistance   Comment visual cues   Oral Hygiene CARE Score 4   Grooming   Able To Comb/Brush Hair;Wash/Dry Face;Brush/Clean Teeth   Limitation Noted In Coordination;Strength   Shower/Bathe Self   Type of Assistance Needed Physical assistance   Physical Assistance Level 51%-75%   Shower/Bathe Self CARE Score 2   Bathing   Assessed Bath Style Sponge Bath   Able to Gather/Transport No   Able to Adjust Water Temperature No   Able to Wash/Rinse/Dry (body part) Left Arm;Right Arm;L Upper Leg;R Upper Leg;Chest;Abdomen   Limitations Noted in Balance;Coordination;Endurance;Safety;Sequencing;Strength;Timeliness   Positioning Seated;Standing   Findings  Assist for thoroughness in all areas.   Upper Body Dressing   Type of Assistance Needed Physical assistance   Physical Assistance Level 51%-75%   Comment assist for doffing and donning long sleeve shirt   Upper Body Dressing CARE Score 2   Lower Body Dressing   Type of Assistance Needed Physical assistance   Physical Assistance Level 76% or more   Comment max A to doff LB clothing and bc clean LB  clothing. Dependent brooks management through clothing.   Lower Body Dressing CARE Score 2   Putting On/Taking Off Footwear   Type of Assistance Needed Physical assistance   Physical  Assistance Level 76% or more   Comment to bc slipper socks. Pt kicks out appropriate foot when presented with sock   Putting On/Taking Off Footwear CARE Score 2   Lying to Sitting on Side of Bed   Type of Assistance Needed Physical assistance   Physical Assistance Level 25% or less   Comment min A to EOB>   Lying to Sitting on Side of Bed CARE Score 3   Sit to Stand   Type of Assistance Needed Physical assistance   Physical Assistance Level 25% or less   Comment min A from EOB   Sit to Stand CARE Score 3   Bed-Chair Transfer   Type of Assistance Needed Physical assistance   Physical Assistance Level 25% or less   Comment min A with RW   Chair/Bed-to-Chair Transfer CARE Score 3   Cognition   Overall Cognitive Status Impaired   Arousal/Participation Alert;Cooperative;Responsive   Attention Attends with cues to redirect   Following Commands Follows multistep commands with increased time or repetition   Activity Tolerance   Activity Tolerance Patient tolerated treatment well   Assessment   Treatment Assessment Pt seen for follow up visit. Pt awake, alert. Communication via white board; gestures, and yes/no answers. ADL tasks as outlined in respective sections. Tasks completed both seated and standing. Most tasks completed successfully with visual cues. Transition to recliner for breakfast meal. Independently uses cup to drink, max A for hand over hand self feeding with breaks. Left OOB in chair at end of session with needs in reach. Tolerates fair. Cooperative with all OT interventions. Will continue to benefit from OT to max I and safety with self care prior to discharge. Progress treatment as status allows.   OT Therapy Minutes   OT Time In 0700   OT Time Out 0835   OT Total Time (minutes) 95   OT Mode of treatment - Individual (minutes) 95

## 2024-02-24 NOTE — PLAN OF CARE
Problem: PAIN - ADULT  Goal: Verbalizes/displays adequate comfort level or baseline comfort level  Description: Interventions:  - Encourage patient to monitor pain and request assistance  - Assess pain using appropriate pain scale  - Administer analgesics based on type and severity of pain and evaluate response  - Implement non-pharmacological measures as appropriate and evaluate response  - Consider cultural and social influences on pain and pain management  - Notify physician/advanced practitioner if interventions unsuccessful or patient reports new pain  Outcome: Progressing     Problem: INFECTION - ADULT  Goal: Absence or prevention of progression during hospitalization  Description: INTERVENTIONS:  - Assess and monitor for signs and symptoms of infection  - Monitor lab/diagnostic results  - Monitor all insertion sites, i.e. indwelling lines, tubes, and drains  - Monitor endotracheal if appropriate and nasal secretions for changes in amount and color  - [place] appropriate cooling/warming therapies per order  - Administer medications as ordered  - Instruct and encourage patient and family to use good hand hygiene technique  - Identify and instruct in appropriate isolation precautions for identified infection/condition  Outcome: Progressing  Goal: Absence of fever/infection during neutropenic period  Description: INTERVENTIONS:  - Monitor WBC    Outcome: Progressing

## 2024-02-24 NOTE — PROGRESS NOTES
Progress Note - Thomas Chase 83 y.o. male MRN: [id number]    Unit/Bed#: [facility] 213-02 Encounter: [id number]        Subjective:   Patient seen and examined at bedside after reviewing the chart and discussing the case with the caring staff.      Patient examined at bedside.  Wife at bedside.  Patient has no reported acute symptoms.      Patient's family wants to know that for how long patient needs his Finley's catheter.    Physical Exam   Vitals: Blood pressure (!) 85/57, pulse 99, temperature 97.8 °F (36.6 °C), temperature source Temporal, resp. rate 18, height 6' (1.829 m), weight 57.5 kg (126 lb 12.2 oz), SpO2 94%.,Body mass index is 17.19 kg/m².  Constitutional: Patient in no acute distress.  HEENT: PERR, EOMI, MMM.  Cardiovascular: Normal rate and regular rhythm.    Pulmonary/Chest: Effort normal and breath sounds normal.   Abdomen: Soft, + BS, NT.    Assessment/Plan:  Thomas Chase is a(n) 83 y.o. year old male with left SDH and right SAH.     1.  Cardiac with hx of HTN, HLD.  BP appears stable.  On pravastatin 40 mg daily (simvastatin nonformulary).  2.  Allergic rhinitis.  Patient is on loratadine 10 mg daily.  3.  Depression/anxiety.  Patient is on Zoloft 25 mg daily.  4.  Insomnia.  Patient is on melatonin 6 mg at bedtime.  5.  Thrush.  Nystatin liquid swish and swallow 4 times daily.  6.  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  Patient is mainly nonverbal.  7.  Urinary retention.  Urinary retention protocol.  Started on Flomax 0.4 mg daily 2/21/24.  Urology consulted.  Finley catheter placed 2/22/24.  Per urology, continue Flomax for 5-7 days before repeating voiding trial.   8.  Dysphagia.  Speech therapy following.  Dysphagia 1 puureed with nectar thick liquid.  9.  Pain and bowel management.  As per physiatrist.  10.  Intracranial hemorrhage.   Patient will be receiving intensive PT OT ST as per physiatrist.    Anticipated discharge date:  Friday 3/8/24  PCP:  Jesenia Badillo,  CRNP

## 2024-02-25 PROCEDURE — 97116 GAIT TRAINING THERAPY: CPT

## 2024-02-25 PROCEDURE — 97537 COMMUNITY/WORK REINTEGRATION: CPT

## 2024-02-25 PROCEDURE — 97110 THERAPEUTIC EXERCISES: CPT

## 2024-02-25 PROCEDURE — 97530 THERAPEUTIC ACTIVITIES: CPT

## 2024-02-25 RX ADMIN — NYSTATIN 500000 UNITS: 100000 SUSPENSION ORAL at 17:01

## 2024-02-25 RX ADMIN — POLYETHYLENE GLYCOL 3350 17 G: 17 POWDER, FOR SOLUTION ORAL at 12:19

## 2024-02-25 RX ADMIN — TAMSULOSIN HYDROCHLORIDE 0.4 MG: 0.4 CAPSULE ORAL at 16:58

## 2024-02-25 RX ADMIN — LORATADINE 10 MG: 10 TABLET ORAL at 09:25

## 2024-02-25 RX ADMIN — ACETAMINOPHEN 975 MG: 325 TABLET ORAL at 21:28

## 2024-02-25 RX ADMIN — NYSTATIN 500000 UNITS: 100000 SUSPENSION ORAL at 12:19

## 2024-02-25 RX ADMIN — SERTRALINE HYDROCHLORIDE 25 MG: 25 TABLET ORAL at 09:25

## 2024-02-25 RX ADMIN — ACETAMINOPHEN 975 MG: 325 TABLET ORAL at 13:48

## 2024-02-25 RX ADMIN — ACETAMINOPHEN 975 MG: 325 TABLET ORAL at 05:37

## 2024-02-25 RX ADMIN — Medication 6 MG: at 21:28

## 2024-02-25 RX ADMIN — Medication 1 TABLET: at 09:25

## 2024-02-25 RX ADMIN — ENOXAPARIN SODIUM 40 MG: 40 INJECTION SUBCUTANEOUS at 09:25

## 2024-02-25 RX ADMIN — PRAVASTATIN SODIUM 40 MG: 40 TABLET ORAL at 16:58

## 2024-02-25 RX ADMIN — NYSTATIN 500000 UNITS: 100000 SUSPENSION ORAL at 21:29

## 2024-02-25 RX ADMIN — NYSTATIN 500000 UNITS: 100000 SUSPENSION ORAL at 09:25

## 2024-02-25 NOTE — PROGRESS NOTES
02/25/24 1030   Pain Assessment   Pain Assessment Tool 0-10   Pain Score No Pain   Restrictions/Precautions   Precautions Aspiration;Bed/chair alarms;Cognitive;Fall Risk;Finley;Hard of hearing;Impulsive;Supervision on toilet/commode   Weight Bearing Restrictions No   ROM Restrictions No   Cognition   Overall Cognitive Status Impaired   Arousal/Participation Alert;Responsive;Cooperative   Attention Attends with cues to redirect   Orientation Level Oriented to person;Other (Comment)  (+month and year (-) date; +hospital /c cues)   Memory Decreased recall of biographical information;Decreased short term memory;Decreased recall of recent events;Decreased recall of precautions   Following Commands Follows one step commands with increased time or repetition   Subjective   Subjective Pt largerly not verbal   Lying to Sitting on Side of Bed   Type of Assistance Needed Physical assistance   Physical Assistance Level 25% or less   Comment CGA   Lying to Sitting on Side of Bed CARE Score 3   Sit to Stand   Type of Assistance Needed Incidental touching;Verbal cues   Physical Assistance Level 25% or less   Comment CGA /c tactile/visual cues for hand placement   Sit to Stand CARE Score 3   Bed-Chair Transfer   Type of Assistance Needed Incidental touching;Verbal cues   Physical Assistance Level 25% or less   Comment CGA /c visual/tactile cues for staying inside ILIANA, premautre sitting, motor planning   Chair/Bed-to-Chair Transfer CARE Score 3   Transfer Bed/Chair/Wheelchair   Limitations Noted In Balance;Coordination;Endurance;Problem Solving;Sequencing;UE Strength;LE Strength   Adaptive Equipment Roller Walker   Stand Pivot Contact Guard   Sit to Stand Contact Guard   Stand to Sit Contact Guard   Supine to Sit Contact Guard   Findings Visual/tactile cues for proper technique   Car Transfer   Reason if not Attempted Environmental limitations   Car Transfer CARE Score 10   Walk 10 Feet   Type of Assistance Needed Incidental  touching;Verbal cues   Physical Assistance Level 25% or less   Walk 10 Feet CARE Score 3   Walk 50 Feet with Two Turns   Type of Assistance Needed Incidental touching;Verbal cues   Physical Assistance Level 25% or less   Walk 50 Feet with Two Turns CARE Score 3   Walk 150 Feet   Type of Assistance Needed Incidental touching;Verbal cues   Physical Assistance Level 25% or less   Walk 150 Feet CARE Score 3   Walking 10 Feet on Uneven Surfaces   Type of Assistance Needed Incidental touching;Verbal cues   Physical Assistance Level 25% or less   Walking 10 Feet on Uneven Surfaces CARE Score 3   Ambulation   Primary Mode of Locomotion Prior to Admission Walk   Distance Walked (feet) 131 ft  (131' x2 + shorter distance ambulation in PT gym)   Assist Device Roller Walker   Gait Pattern Inconsistant [place];Slow [place];Decreased foot clearance;Narrow ILIANA;Step through;Improper weight shift   Limitations Noted In Balance;Coordination;Endurance;Heel Strike;Posture;Safety;Sensation;Sequencing;Speed;Strength   Provided Assistance with: Balance;Direction   Walk Assist Level Contact Guard   Findings level and unlevel surfaces   Does the patient walk? 2. Yes   Wheel 50 Feet with Two Turns   Comment DNT   Wheel 150 Feet   Comment DNT   Curb or Single Stair   Style negotiated Single stair   Type of Assistance Needed Incidental touching   Physical Assistance Level 25% or less   1 Step (Curb) CARE Score 3   4 Steps   Type of Assistance Needed Incidental touching   Physical Assistance Level 25% or less   4 Steps CARE Score 3   12 Steps   Reason if not Attempted Activity not applicable   12 Steps CARE Score 9   Stairs   Type Stairs   # of Steps 7   Weight Bearing Precautions Fall Risk   Assist Devices Bilateral Rail   Findings CGA /c BHRs   Picking Up Object   Reason if not Attempted Safety concerns   Picking Up Object CARE Score 88   Toilet Transfer   Comment Did not have to use BR during session   Therapeutic Interventions    Strengthening NuStep   Balance gait/transfers   Other bed mobility, stair negotiaiton   Equipment Use   NuStep NuStep level 1x10' BLE/BUE   Assessment   Treatment Assessment Pt received supine in bed and agreeable to PT session. Utilized whiteboard for communication 2* pt [place]. Completed supine>sit /c CGA for trunk management w/o use of bed features. Transfer training completed focusing on sequence and technique for improved safety and balance /c functional mobility utilizing RW requiring tactile/visual cues for proper technique. Poor eccentric control noted stand>sit. Ambulated room<>PT gym /c RW, /c tactile cues for improving step length and did not require w/c follow this session. Ambulates /c slow, slightly unsteady gait, NBOS, decreased step length, step height, heel strike, forward flexed posture, and poor visual scanning.  Negotiated 7 steps using BHRs /c decreased assistance this session. Completed training on NuStep x10' for LE strengthening and endurance training requiring extended time to complete 2* taking multiple short rest breaks to complete full time. Pt required increased time and therapeutic rest breaks to complete tasks 2* endurance/cognitive deficits. Pt remains limited 2* decreased strength, endurance, balance, postural stability, cognition, attention, poor motor planning/problem solving abilities, hearing deficits, and poor safety awareness/insights into deficits. Pt would continue to benefit from skilled [facility] PT services as pt is functioning below baseline as well as to maximize independence and safety /c MRADLs   Problem List Decreased strength;Decreased range of motion;Decreased endurance;Impaired balance;Decreased cognition;Decreased safety awareness;Impaired judgement;Impaired hearing;Decreased coordination   Barriers to Discharge Inaccessible home environment   PT Barriers   Physical Impairment Decreased strength;Decreased range of motion;Decreased endurance;Impaired balance;Decreased  coordination;Impaired judgement;Impaired hearing;Decreased safety awareness;Decreased cognition   Functional Limitation Walking;Transfers;Standing;Stair negotiation;Ramp negotiation;Car transfers   Plan   Treatment/Interventions Functional transfer training;LE strengthening/ROM;Elevations;Therapeutic exercise;Endurance training;Patient/family training;Cognitive reorientation;Equipment eval/education;Bed mobility;Gait training;Compensatory technique education   Progress Progressing toward goals   PT Therapy Minutes   PT Time In 1030   PT Time Out 1130   PT Total Time (minutes) 60   PT Mode of treatment - Individual (minutes) 60   PT Mode of treatment - Concurrent (minutes) 0   PT Mode of treatment - Group (minutes) 0   PT Mode of treatment - Co-treat (minutes) 0   PT Mode of Treatment - Total time(minutes) 60 minutes   PT Cumulative Minutes 301   Therapy Time missed   Time missed? No

## 2024-02-25 NOTE — NURSING NOTE
Wife and family visiting this afternoon.  Pt aphasic.    Tolerated therapy.  Finley care done and scant amt bleeding noted at insertion site, notified by CNA. Dr. Paz present and examined, no new orders.  Same plan of care continued.  Pt was able to feed himself with encouragement and verbal cueing.

## 2024-02-25 NOTE — PLAN OF CARE
Problem: PAIN - ADULT  Goal: Verbalizes/displays adequate comfort level or baseline comfort level  Description: Interventions:  - Encourage patient to monitor pain and request assistance  - Assess pain using appropriate pain scale  - Administer analgesics based on type and severity of pain and evaluate response  - Implement non-pharmacological measures as appropriate and evaluate response  - Consider cultural and social influences on pain and pain management  - Notify physician/advanced practitioner if interventions unsuccessful or patient reports new pain  Outcome: Progressing     Problem: DISCHARGE PLANNING  Goal: Discharge to home or other facility with appropriate resources  Description: INTERVENTIONS:  - Identify barriers to discharge w/patient and caregiver  - Arrange for needed discharge resources and transportation as appropriate  - Identify discharge learning needs (meds, wound care, etc.)  - Arrange for interpretive services to assist at discharge as needed  - Refer to Case Management Department for coordinating discharge planning if the patient needs post-hospital services based on physician/advanced practitioner order or complex needs related to functional status, cognitive ability, or social support system  Outcome: Progressing     Problem: Nutrition/Hydration-ADULT  Goal: Nutrient/Hydration intake appropriate for improving, restoring or maintaining nutritional needs  Description: Monitor and assess patient's nutrition/hydration status for malnutrition. Collaborate with interdisciplinary team and initiate plan and interventions as ordered.  Monitor patient's weight and dietary intake as ordered or per policy. Utilize nutrition screening tool and intervene as necessary. Determine patient's food preferences and provide high-protein, high-caloric foods as appropriate.     INTERVENTIONS:  - Monitor oral intake, urinary output, labs, and treatment plans  - Assess nutrition and hydration status and  recommend course of action  - Evaluate amount of meals eaten  - Assist patient with eating if necessary   - Allow adequate time for meals  - Recommend/ encourage appropriate diets, oral nutritional supplements, and vitamin/mineral supplements  - Order, calculate, and assess calorie counts as needed  - Recommend, monitor, and adjust tube feedings and TPN/PPN based on assessed needs  - Assess need for intravenous fluids  - Provide specific nutrition/hydration education as appropriate  - Include patient/family/caregiver in decisions related to nutrition  Outcome: Progressing

## 2024-02-25 NOTE — PROGRESS NOTES
Progress Note - Thomas Chase 83 y.o. male MRN: [id number]    Unit/Bed#: [facility] 213-02 Encounter: [id number]        Subjective:   Patient seen and examined at bedside after reviewing the chart and discussing the case with the caring staff.      Patient examined at bedside.  Patient has no reported acute symptoms.      Patient's family wants to know that for how long patient needs his Finley's catheter.    Physical Exam   Vitals: Blood pressure 141/73, pulse 81, temperature 97.5 °F (36.4 °C), temperature source Temporal, resp. rate 18, height 6' (1.829 m), weight 57.5 kg (126 lb 12.2 oz), SpO2 99%.,Body mass index is 17.19 kg/m².  Constitutional: Patient in no acute distress.  HEENT: PERR, EOMI, MMM.  Cardiovascular: Normal rate and regular rhythm.    Pulmonary/Chest: Effort normal and breath sounds normal.   Abdomen: Soft, + BS, NT.    Assessment/Plan:  Thomas Chase is a(n) 83 y.o. year old male with left SDH and right SAH.     1.  Cardiac with hx of HTN, HLD.  BP appears stable.  On pravastatin 40 mg daily (simvastatin nonformulary).  2.  Allergic rhinitis.  Patient is on loratadine 10 mg daily.  3.  Depression/anxiety.  Patient is on Zoloft 25 mg daily.  4.  Insomnia.  Patient is on melatonin 6 mg at bedtime.  5.  Thrush.  Nystatin liquid swish and swallow 4 times daily.  6.  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  Patient is mainly nonverbal.  7.  Urinary retention.  Urinary retention protocol.  Started on Flomax 0.4 mg daily 2/21/24.  Urology consulted.  Finley catheter placed 2/22/24.  Per urology, continue Flomax for 5-7 days before repeating voiding trial.   8.  Dysphagia.  Speech therapy following.  Dysphagia 1 puureed with nectar thick liquid.  9.  Pain and bowel management.  As per physiatrist.  10.  Intracranial hemorrhage.   Patient will be receiving intensive PT OT ST as per physiatrist.    Anticipated discharge date:  Friday 3/8/24  PCP:  RONY Diaz

## 2024-02-25 NOTE — NURSING NOTE
Patient resting in bed at this time.  No signs of distress noted.  Patient is deaf with cochlear implant at home with wife communication with whiteboard overnight as needed.  Patient with bed alarm in place and close supervision to maintain patient safety.  Finley catheter in place draining tiana urine.  Patient was able to reposition frequently to promote skin integrity.  Mepilex foam dressings to the sacrum and bilateral heels for pressure prevention.  Heels elevated off of bed on a pillow overnight.  Call bell within reach.  Plan of care ongoing.     regular

## 2024-02-25 NOTE — QUICK NOTE
PMR Quick Note    Reviewed chart. Still no BM recorded since 2/21. Would give Prn bowel med today. Would address bowels prior to removing brooks. Per primary team, earliest brooks could come out is 2/27. Would agree to give flomax time to achieve therapeutic level and address bowels. Otherwise continue current plan of care.    Ashley de Padua, MD  Physical Medicine and Rehabilitation

## 2024-02-26 PROCEDURE — 99232 SBSQ HOSP IP/OBS MODERATE 35: CPT | Performed by: PHYSICAL MEDICINE & REHABILITATION

## 2024-02-26 PROCEDURE — 97110 THERAPEUTIC EXERCISES: CPT

## 2024-02-26 PROCEDURE — 97530 THERAPEUTIC ACTIVITIES: CPT

## 2024-02-26 PROCEDURE — 97535 SELF CARE MNGMENT TRAINING: CPT

## 2024-02-26 PROCEDURE — 92526 ORAL FUNCTION THERAPY: CPT | Performed by: NURSE PRACTITIONER

## 2024-02-26 PROCEDURE — 97116 GAIT TRAINING THERAPY: CPT

## 2024-02-26 PROCEDURE — 97537 COMMUNITY/WORK REINTEGRATION: CPT

## 2024-02-26 PROCEDURE — 97129 THER IVNTJ 1ST 15 MIN: CPT

## 2024-02-26 PROCEDURE — 92507 TX SP LANG VOICE COMM INDIV: CPT | Performed by: NURSE PRACTITIONER

## 2024-02-26 RX ADMIN — NYSTATIN 500000 UNITS: 100000 SUSPENSION ORAL at 11:44

## 2024-02-26 RX ADMIN — ACETAMINOPHEN 975 MG: 325 TABLET ORAL at 06:29

## 2024-02-26 RX ADMIN — PRAVASTATIN SODIUM 40 MG: 40 TABLET ORAL at 16:51

## 2024-02-26 RX ADMIN — ACETAMINOPHEN 975 MG: 325 TABLET ORAL at 21:44

## 2024-02-26 RX ADMIN — NYSTATIN 500000 UNITS: 100000 SUSPENSION ORAL at 17:28

## 2024-02-26 RX ADMIN — LORATADINE 10 MG: 10 TABLET ORAL at 08:41

## 2024-02-26 RX ADMIN — ENOXAPARIN SODIUM 40 MG: 40 INJECTION SUBCUTANEOUS at 08:41

## 2024-02-26 RX ADMIN — Medication 6 MG: at 21:44

## 2024-02-26 RX ADMIN — NYSTATIN 500000 UNITS: 100000 SUSPENSION ORAL at 08:45

## 2024-02-26 RX ADMIN — ACETAMINOPHEN 975 MG: 325 TABLET ORAL at 14:06

## 2024-02-26 RX ADMIN — TAMSULOSIN HYDROCHLORIDE 0.4 MG: 0.4 CAPSULE ORAL at 16:51

## 2024-02-26 RX ADMIN — SERTRALINE HYDROCHLORIDE 25 MG: 25 TABLET ORAL at 08:41

## 2024-02-26 RX ADMIN — Medication 1 TABLET: at 08:41

## 2024-02-26 RX ADMIN — NYSTATIN 500000 UNITS: 100000 SUSPENSION ORAL at 21:44

## 2024-02-26 NOTE — PROGRESS NOTES
Physical Medicine and Rehabilitation Progress Note  Thomas Chase 83 y.o. male MRN: [id number]  Unit/Bed#: [facility] 213-02 Encounter: [id number]    Etiology/Reason for Admission: Impairment of mobility, safety, Activities of Daily Living (ADLs), and cognitive/communication skills due to Brain Dysfunction:  02.22  Traumatic, Closed Injury    Interval History: Seen face-to-face at bedside. No acute events overnight. His wife is present with him. No current concerns, he is progressing well with therapies and ate 75% of his breakfast at an independent level during SLP session this morning. Vital signs reviewed, stable and WNL. Bowel movement did occur today. Urine output via indwelling urinary catheter was slightly low over the last 24 hours (0.2 cc/kg/hr), will continue to encourage PO fluids.     Assessment/Plan - Continue plan of care as per primary attending, unless otherwise stated below:      Rehab - Continue PT, OT, and SLP  Bowel - Patient reports no constipation  Bladder - Urology consulted and now following for urinary retention. Indwelling urinary catheter placed on 2/22/24 after being unable to spontaneously void. Will consider repeat trial of void after 5-7 days (2/27/24 at this earliest) and will continue tamsulosin for the time being to allow achievement of therapeutic level. U/A was negative for UTI. Unclear etiology for retention, possibly medication related, could be rare effect of Keppra versus hypomobility-induced.   Skin -  Encourage regular turning as patient at risk for skin breakdown  Staff to continue patient education on Q2h turning  Rehabilitation team to perform skin checks regularly  Pain - Continue current pain regimen  HTN - Stable, continue anti-hypertensives as per IM  ICH - Keppra for seizure prophylaxis, monitor neurologic examination and outpatient follow-up with neurosurgery   Gross hematuria - Likely trauma related with presence of indwelling urinary catheter after mobilization with  therapies. Will monitor.   At risk for falls - off 1:1, maintain room proximity to RN station, 4 side rails, behavior log at night, maintain good sleep/wake cycle, avoid overstimulation, will obtain cochlear implant devices to improve hearing and interaction/connection with environment  Disposition - plan for discharge to home with home PT, OT, SLP and RN on 3/8/24    Daniel Clayton MD  Attending Physician  Physical Medicine and Rehabilitation  [facility]    Review of Systems:  A 10 point ROS was performed; negative except as noted above.       Current Facility-Administered Medications:     acetaminophen (TYLENOL) tablet 975 mg, 975 mg, Oral, Q8H, Alexandrea Lugo MD, 975 mg at 02/26/24 1406    enoxaparin (LOVENOX) subcutaneous injection 40 mg, 40 mg, Subcutaneous, Q24H JEANNIE, Alexandrea Lguo MD, 40 mg at 02/26/24 0841    loratadine (CLARITIN) tablet 10 mg, 10 mg, Oral, Daily, Amada L Dagnall, PA-C, 10 mg at 02/26/24 0841    meclizine (ANTIVERT) tablet 25 mg, 25 mg, Oral, Q8H PRN, Amada L Dagnall, PA-C    melatonin tablet 6 mg, 6 mg, Oral, HS, Amada L Dagnall, PA-C, 6 mg at 02/25/24 2128    multivitamin-minerals (CENTRUM) tablet 1 tablet, 1 tablet, Oral, Daily, Amada L Dagnall, PA-C, 1 tablet at 02/26/24 0841    nystatin (MYCOSTATIN) oral suspension 500,000 Units, 500,000 Units, Swish & Swallow, 4x Daily, Darwin Paz MD, 500,000 Units at 02/26/24 1144    polyethylene glycol (MIRALAX) packet 17 g, 17 g, Oral, Daily PRN, Ashley Depadua, MD, 17 g at 02/25/24 1219    pravastatin (PRAVACHOL) tablet 40 mg, 40 mg, Oral, Daily With Dinner, Amada L Dagnall, PA-C, 40 mg at 02/25/24 1658    sertraline (ZOLOFT) tablet 25 mg, 25 mg, Oral, Daily, Amada L Dagnall, PA-C, 25 mg at 02/26/24 0841    tamsulosin (FLOMAX) capsule 0.4 mg, 0.4 mg, Oral, Daily With Dinner, Daniel Clayton MD, 0.4 mg at 02/25/24 8933    Physical Exam:  Temp:  [97.5 °F (36.4 °C)-99.3 °F (37.4 °C)] 97.5 °F (36.4  °C)  HR:  [] 86  Resp:  [16-17] 16  BP: (121-132)/(61-75) 132/75  SpO2:  [96 %-97 %] 96 %    GEN: Patient seen resting comfortably in bedside chair, NAD  HEENT: NCAT; mucous membranes moist  CV: No extremity edema  PULM: Breathing comfortably on room air  ABD: Non-distended  SKIN: No obvious rashes or lesions on exposed skin  : Indwelling urinary catheter in place, urine in bag is red-tinged in color, there is no red colored urine seen on the draining tube currently.   NEURO:  Awake and alert, very hard of hearing, able to follow simple commands with clear consistency  Does not produce spontaneous speech today  Moving all extremities spontaneously in chair    Laboratory:  No new labs  Results from last 7 days   Lab Units 02/20/24  0937   HEMOGLOBIN g/dL 13.0   HEMATOCRIT % 40.5   WBC Thousand/uL 6.88     Results from last 7 days   Lab Units 02/20/24  0937   BUN mg/dL 25   SODIUM mmol/L 138   POTASSIUM mmol/L 4.2   CHLORIDE mmol/L 103   CREATININE mg/dL 0.55*            Diagnostic Studies: Reviewed, no new imaging   No orders to display       ** Please Note: Fluency Direct voice to text software may have been used in the creation of this document. **    I have spent a total time of 35 minutes on 02/26/24 in caring for this patient including Instructions for management, Patient and family education, Importance of tx compliance, Impressions, Counseling / Coordination of care, Documenting in the medical record, Obtaining or reviewing history  , and Communicating with other healthcare professionals .

## 2024-02-26 NOTE — PROGRESS NOTES
Progress Note - Thomas Chase 83 y.o. male MRN: [id number]    Unit/Bed#: [facility] 213-02 Encounter: [id number]        Subjective:   Patient seen and examined at bedside after reviewing the chart and discussing the case with the caring staff.      Patient examined at bedside.  Patient has no reported acute symptoms.  Patient's Finley's catheter had blood-tinged urine earlier but it became clear later.    Physical Exam   Vitals: Blood pressure 132/75, pulse 86, temperature 97.5 °F (36.4 °C), temperature source Temporal, resp. rate 16, height 6' (1.829 m), weight 57.5 kg (126 lb 12.2 oz), SpO2 96%.,Body mass index is 17.19 kg/m².  Constitutional: Patient in no acute distress.  HEENT: PERR, EOMI, MMM.  Cardiovascular: Normal rate and regular rhythm.    Pulmonary/Chest: Effort normal and breath sounds normal.   Abdomen: Soft, + BS, NT.    Assessment/Plan:  Thomas Chase is a(n) 83 y.o. year old male with left SDH and right SAH.     1.  Cardiac with hx of HTN, HLD.  BP appears stable.  On pravastatin 40 mg daily (simvastatin nonformulary).  2.  Allergic rhinitis.  Patient is on loratadine 10 mg daily.  3.  Depression/anxiety.  Patient is on Zoloft 25 mg daily.  4.  Insomnia.  Patient is on melatonin 6 mg at bedtime.  5.  Thrush.  Nystatin liquid swish and swallow 4 times daily.  6.  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  Patient is mainly nonverbal.  7.  Urinary retention.  Urinary retention protocol.  Started on Flomax 0.4 mg daily 2/21/24.  Urology consulted.  Finley catheter placed 2/22/24.  Per urology, continue Flomax for 5-7 days before repeating voiding trial.   8.  Dysphagia.  Speech therapy following.  Dysphagia 1 puureed with nectar thick liquid.  9.  Pain and bowel management.  As per physiatrist.  10.  Intracranial hemorrhage.   Patient will be receiving intensive PT OT ST as per physiatrist.    Anticipated discharge date:  Friday 3/8/24  PCP:  RONY Diaz

## 2024-02-26 NOTE — PROGRESS NOTES
02/26/24 1300   Pain Assessment   Pain Assessment Tool 0-10   Pain Score No Pain   Restrictions/Precautions   Precautions Aspiration;Bed/chair alarms;Cognitive;Fall Risk;Finley;Hard of hearing;Supervision on toilet/commode   Cognition   Overall Cognitive Status Impaired   Arousal/Participation Alert;Responsive;Cooperative   Attention Attends with cues to redirect   Orientation Level Oriented to person;Oriented to place;Oriented to time  (using white communication board)   Memory Decreased recall of biographical information;Decreased short term memory;Decreased recall of recent events;Decreased recall of precautions   Following Commands Follows one step commands with increased time or repetition   Roll Left and Right   Type of Assistance Needed Supervision   Roll Left and Right CARE Score 4   Sit to Lying   Type of Assistance Needed Incidental touching;Verbal cues   Comment cg with increased visual/tactile cues   Sit to Lying CARE Score 4   Sit to Stand   Type of Assistance Needed Physical assistance   Physical Assistance Level 26%-50%   Comment cg/min A with RW with visual/tactile cues   Sit to Stand CARE Score 3   Bed-Chair Transfer   Type of Assistance Needed Physical assistance   Physical Assistance Level 26%-50%   Comment cg/min A with RW with visual/tactile cues   Chair/Bed-to-Chair Transfer CARE Score 3   Walk 10 Feet   Type of Assistance Needed Physical assistance   Physical Assistance Level 26%-50%   Comment cg/min A level and unlevel surfaces with RW; visual and tactile cues for sequence and direction   Walk 10 Feet CARE Score 3   Walk 50 Feet with Two Turns   Type of Assistance Needed Physical assistance   Physical Assistance Level 26%-50%   Comment cg/min A level and unlevel surfaces with RW; visual and tactile cues for sequence and direction   Walk 50 Feet with Two Turns CARE Score 3   Walking 10 Feet on Uneven Surfaces   Type of Assistance Needed Physical assistance   Physical Assistance Level 26%-50%    Comment cg/min A level and unlevel surfaces with RW; visual and tactile cues for sequence and direction   Walking 10 Feet on Uneven Surfaces CARE Score 3   Ambulation   Primary Mode of Locomotion Prior to Admission Walk   Distance Walked (feet) 137 ft  (131' 106' 137')   Assist Device Roller Walker   Gait Pattern Inconsistant Maria Victoria;Slow Maria Victoria;Decreased foot clearance;Narrow ILIANA;Step through;Improper weight shift   Limitations Noted In Balance;Coordination;Device Management;Endurance;Heel Strike;Posture;Safety;Sequencing;Speed;Strength   Provided Assistance with: Balance;Direction   Walk Assist Level Contact Guard;Minimum Assist   Findings cg/min A level and unlevel surfaces with RW;  visual and tactile cues for sequence and direction   Does the patient walk? 2. Yes   Curb or Single Stair   Style negotiated Single stair   Type of Assistance Needed Physical assistance   Physical Assistance Level 26%-50%   Comment min A with 2 HRs and cues for sequence/direction   1 Step (Curb) CARE Score 3   4 Steps   Type of Assistance Needed Physical assistance   Physical Assistance Level 26%-50%   Comment min A with 2 HRs and cues for sequence/direction   4 Steps CARE Score 3   Stairs   Type Stairs   # of Steps 7   Weight Bearing Precautions WBAT;Fall Risk   Assist Devices Bilateral Rail   Findings min A with 2 HRs and cues for sequence/direction   Therapeutic Interventions   Strengthening seated ther ex   Balance gait and transfer training   Other stair negotiation   Assessment   Treatment Assessment Patient agreeable to therapy session.   No pain reported.   Increased visual/tactile required for general safety, direction, and task sequence.   Completed ther ex for general LE strengthening; gait and transfer training focusing on sequence and technique for improved balance and safety with functional mobility using walker.  Negotiated steps with 2 handrails and max cues for sequence and direction.    Patient returned to bed  with call bell and all needs within reach.   PT Barriers   Physical Impairment Decreased strength;Decreased range of motion;Decreased endurance;Impaired balance;Decreased mobility;Decreased coordination;Decreased cognition;Impaired judgement;Decreased safety awareness;Impaired hearing   Functional Limitation Wheelchair management;Walking;Transfers;Standing;Stair negotiation;Ramp negotiation;Car transfers   Plan   Treatment/Interventions Functional transfer training;LE strengthening/ROM;Elevations;Therapeutic exercise;Bed mobility;Gait training   Progress Progressing toward goals   PT Therapy Minutes   PT Time In 1300   PT Time Out 1411   PT Total Time (minutes) 71   PT Mode of treatment - Individual (minutes) 71   PT Mode of treatment - Concurrent (minutes) 0   PT Mode of treatment - Group (minutes) 0   PT Mode of treatment - Co-treat (minutes) 0   PT Mode of Treatment - Total time(minutes) 71 minutes   PT Cumulative Minutes 438   Therapy Time missed   Time missed? No

## 2024-02-26 NOTE — PROGRESS NOTES
"   02/26/24 1130   Pain Assessment   Pain Assessment Tool 0-10   Pain Score No Pain  (verbally denied pain)   Pain Rating: FLACC (Rest) - Face 0   Pain Rating: FLACC (Rest) - Legs 0   Pain Rating: FLACC (Rest) - Activity 0   Pain Rating: FLACC (Rest) - Cry 0   Pain Rating: FLACC (Rest) - Consolability 0   Score: FLACC (Rest) 0   Speech/Language/Cognition Assessmetn   Treatment Assessment demonstrating understanding 1-3 word questions and verbally repeating and answering. He greatly benefitted from verbal reminder on written board to 'swallow again\" to achieve second swallow prior to next bite. Great attention to clinician today, demonstrating good eye contact in response to tactile and written cues.   Swallow Assessment   Swallow Treatment Assessment Assessed with lunch tray of purred meat, mashed potato, corn, nectar thick liquids via 10CC provalave cup, applesauce and gingerale via blue 5CC provalve cup. He was able to feed himself independently across 75% of his meal tray. Visual cue for a second swallow needed with some minimal independent carryover demonstrated. He took liquid washes as needed throughout the meal. Overall very minimal rare dry cough with use of strategies consistently. Increased cough with thin liquid vs. nectar thick despite use of provalve cups. Will continue to monitor but overall decreased from last swallow tx session.   SLP Therapy Minutes   SLP Time In 1130   SLP Time Out 1300   SLP Total Time (minutes) 90   SLP Mode of treatment - Individual (minutes) 90   SLP Mode of treatment - Concurrent (minutes) 0   SLP Mode of treatment - Group (minutes) 0   SLP Mode of treatment - Co-treat (minutes) 0   SLP Mode of Treatment - Total time(minutes) 90 minutes   SLP Cumulative Minutes 310       "

## 2024-02-26 NOTE — PROGRESS NOTES
02/26/24 0830   Pain Assessment   Pain Assessment Tool 0-10   Pain Score No Pain   Restrictions/Precautions   Precautions Aspiration;Bed/chair alarms;Cognitive;Fall Risk;Hard of hearing;Finley   Weight Bearing Restrictions No   ROM Restrictions No   Eating   Type of Assistance Needed Physical assistance   Physical Assistance Level 51%-75%   Comment pt self-fed everything directly on plate and a few bites of yogurt when presented, declined feeding self foods in cups that were not directly on plate, ? aware of their presence; able to grab provalve cup and drink both with and without prompting; overall 75% of meal eaten   Eating CARE Score 2   Oral Hygiene   Type of Assistance Needed Supervision   Physical Assistance Level No physical assistance   Comment visual cues for thoroughness   Oral Hygiene CARE Score 4   Grooming   Able To Wash/Dry Face;Comb/Brush Hair;Brush/Clean Teeth   Limitation Noted In Problem Solving;Safety;Sequencing;Strength   Shower/Bathe Self   Type of Assistance Needed Physical assistance   Physical Assistance Level 26%-50%   Comment assist for buttocks, bilat feet; visual cues for sequencing   Shower/Bathe Self CARE Score 3   Bathing   Assessed Bath Style Sponge Bath   Anticipated D/C Bath Style Shower;Sponge Bath   Able to Gather/Transport No   Able to Wash/Rinse/Dry (body part) Left Arm;Right Arm;L Upper Leg;R Upper Leg;Chest;Abdomen;Perineal Area   Limitations Noted in Balance;Endurance;Problem Solving;Safety;ROM;Sequencing;Strength   Positioning Seated;Standing   Upper Body Dressing   Type of Assistance Needed Physical assistance   Physical Assistance Level 26%-50%   Comment assist to doff/don flannel shirts over bilat UE, align t-shirt with UE, don over head   Upper Body Dressing CARE Score 3   Lower Body Dressing   Type of Assistance Needed Physical assistance   Physical Assistance Level 76% or more   Comment able to assist in pulling new clothing over knees, visual cues from OT to attempt  to pull pants down and up, however pt unable   Lower Body Dressing CARE Score 2   Putting On/Taking Off Footwear   Type of Assistance Needed Physical assistance   Physical Assistance Level 76% or more   Comment able to present bilat feet to OT to doff/don socks   Putting On/Taking Off Footwear CARE Score 2   Dressing/Undressing Clothing   Remove UB Clothes Pullover Shirt;Jacket   Don UB Clothes Pullover Shirt;Jacket   Remove LB Clothes Undergarment;Pants;Socks   Don LB Clothes Pants;Undergarment;Socks   Limitations Noted In Balance;Endurance;Problem Solving;Safety;Sequencing;Strength;ROM   Positioning Supported Sit   Sit to Lying   Type of Assistance Needed Incidental touching   Comment CG   Sit to Lying CARE Score 4   Sit to Stand   Type of Assistance Needed Physical assistance   Physical Assistance Level 25% or less   Sit to Stand CARE Score 3   Toileting Hygiene   Type of Assistance Needed Physical assistance   Physical Assistance Level Total assistance   Comment incontinent of bowel initially, then required assist for bowel hygiene after transferring to commode   Toileting Hygiene CARE Score 1   Toileting   Able to Pull Clothing down no, up no.   Able to Manage Clothing Closures Yes   Manage Hygiene Bowel   Limitations Noted In Balance;Problem Solving;ROM;Safety;Sequencing;UE Strength;LE Strength   Adaptive Equipment   (RW)   Toilet Transfer   Type of Assistance Needed Physical assistance   Physical Assistance Level 25% or less   Toilet Transfer CARE Score 3   Toilet Transfer   Surface Assessed Standard Commode   Transfer Technique Stand Pivot   Limitations Noted In Balance;Endurance;Problem Solving;ROM;Safety;Sequencing;UE Strength;LE Strength   Adaptive Equipment Walker   Exercise Tools   Other Exercise Tool 1 placed playing cards in matching pockets while seated, pt found all matches with 100% accuracy and increased time, occasional assist under R elbow to obtain full flexion to reach top of board   Cognition    Overall Cognitive Status Impaired   Arousal/Participation Alert;Responsive;Cooperative   Attention Attends with cues to redirect   Orientation Level Oriented to person;Oriented to place;Oriented to situation  (cues required for day of week)   Memory Decreased recall of biographical information;Decreased short term memory;Decreased recall of recent events;Decreased recall of precautions   Following Commands Follows one step commands with increased time or repetition   Assessment   Treatment Assessment Pt agreeable to OT session this AM. Received sitting upright in w/c. ADL session completed; current LOF and details listed in respective sections. Pt is overall maxA toileting and LB dressing, modA eating, Brayan UB dressing and bathing, supervision oral care; fxl transfers overall Brayan. Visual and physical cues throughout session for sequencing and problem solving, with increased time needed for comprehension. After ADL, pt completed cognitive card match activity with good sustained attention and problem solving; 100% accurate with activity completion. Pt's wife present for last few minutes of session and discussed pt's PLOF regarding ADL tasks, which is similar to pt's current LOF during OT sessions. Pt returned to supine at end of session. decreased strength throughout, decreased balance, baseline impaired sensation in bilat UE and RLE, baseline impaired bilat shld AROM, c-collar with c-spine precautions, and decreased activity tolerance; all affect independence in self care and fxl transfers. Pt will participate in ADL training, therapeutic exercises and activities, fxl mobilityl/transfers, and activity tolerance in order to progress towards goals. Pt would benefit from continued skilled OT services in order to address listed barriers and prepare for safe d/c.   Prognosis Fair   Problem List Decreased strength;Decreased range of motion;Decreased endurance;Impaired balance;Decreased cognition;Decreased safety  awareness;Impaired judgement;Impaired hearing;Decreased coordination   Plan   Treatment/Interventions ADL retraining;Functional transfer training;LE strengthening/ROM;Therapeutic exercise;Endurance training;Cognitive reorientation;Patient/family training;Equipment eval/education;Compensatory technique education;OT   Progress Progressing toward goals   OT Therapy Minutes   OT Time In 0830   OT Time Out 1034   OT Total Time (minutes) 124   OT Mode of treatment - Individual (minutes) 124

## 2024-02-26 NOTE — NUTRITION
02/26/24 1416   Biochemical Data,Medical Tests, and Procedures   Biochemical Data/Medical Tests/Procedures Lab values reviewed;Meds reviewed   Labs (Comment) No new labs   Meds (Comment) lovenox, antivert, melatonin, centrum, pravachol, zoloft   Nutrition-Focused Physical Exam   Nutrition-Focused Physical Exam Findings RN skin assessment reviewed  (Skin tear right pretibial, skin tear right elbow, wound right shoulder per documentation.)   Nutrition-Focused Physical Exam Findings No edema   Medical-Related Concerns PMH reviewed   Adequacy of Intake   Nutrition Modality PO   Feeding Route   PO With assistance   Current PO Intake   Current Diet Order Dysphagia 1 NTL, extra sauce gravy. Food supplements   Nutrition Supplements Mighty Shake;Magic Cup  (mightyshake TID; magic cup BID)   Current Meal Intake 50-75%;%   Intake Supplements 0-25%;25-50%;50-75%;%   Estimated calorie intake compared to estimated need Nutrient needs not always met.   PES Statement   Problem Continue previous diagnosis   Recommendations/Interventions   Malnutrition/BMI Present Yes  (does not meet criteria)   Summary Dysphagia 1, NTL extra sauce/gravy. B:oatmeal, yogurt, applesauce, pudding. L/D: yogurt, applesauce, pudding. Mightyshake TID, magic cup BID. Meal intakes 75%. Supplement intakes vary. No new weight; current weight requested. Medications reviewed. No new labs. Expressive aphasia. Dental implants. No edema. Skin integrity reviewed. LBM 2/26. Indwelling catheter. Wife reports patient has been eating pretty well. She requested extra sauce/gravy on the side; order updated. Continue current nutrition interventions.   Interventions/Recommendations Continue current diet order;Supplement continue;Other (Specify)  (continue food supplements.)   Education Assessment   Education Patient/caregiver not appropriate for education at this time   Patient Nutrition Goals   Goal Avoid weight loss;Tolerate PO diet;Meet PO needs   Goal  Status Extended   Timeframe to complete goal by next f/u   Nutrition Complexity Risk   Nutrition complexity level High risk   Nutrition review: 03/01/24   Follow up date 03/01/24

## 2024-02-26 NOTE — PROGRESS NOTES
02/26/24 0658   Pain Assessment   Pain Assessment Tool 0-10   Pain Score No Pain   Restrictions/Precautions   Precautions Aspiration;Bed/chair alarms;Cognitive;Fall Risk;Finley;Hard of hearing;Impulsive;Supervision on toilet/commode   Weight Bearing Restrictions No   Cognition   Overall Cognitive Status Impaired   Arousal/Participation Alert;Responsive;Cooperative   Attention Attends with cues to redirect   Orientation Level Oriented to person;Oriented to place;Oriented to time  (using white board)   Memory Decreased recall of biographical information;Decreased short term memory;Decreased recall of recent events;Decreased recall of precautions   Following Commands Follows one step commands with increased time or repetition   Roll Left and Right   Type of Assistance Needed Supervision   Comment using bed rails and visual/tactile cues   Roll Left and Right CARE Score 4   Lying to Sitting on Side of Bed   Type of Assistance Needed Incidental touching   Comment cg using bed rails and visual/tactile cues   Lying to Sitting on Side of Bed CARE Score 4   Sit to Stand   Type of Assistance Needed Physical assistance   Physical Assistance Level 26%-50%   Comment min A STS using RW and visual/tactile cues   Sit to Stand CARE Score 3   Bed-Chair Transfer   Type of Assistance Needed Physical assistance   Physical Assistance Level 26%-50%   Comment min A STS using RW and visual/tactile cues   Chair/Bed-to-Chair Transfer CARE Score 3   Walk 10 Feet   Type of Assistance Needed Physical assistance   Physical Assistance Level 26%-50%   Comment min A level surfaces; increased visual/tactile cues for safety and direction   Walk 10 Feet CARE Score 3   Walk 50 Feet with Two Turns   Type of Assistance Needed Physical assistance   Physical Assistance Level 26%-50%   Comment min A level surfaces; increased visual/tactile cues for safety and direction   Walk 50 Feet with Two Turns CARE Score 3   Walk 150 Feet   Type of Assistance Needed  Physical assistance   Physical Assistance Level 26%-50%   Comment min A level surfaces; increased visual/tactile cues for safety and direction   Walk 150 Feet CARE Score 3   Ambulation   Primary Mode of Locomotion Prior to Admission Walk   Distance Walked (feet) 151 ft  (151' 14' x 2 (in/out of bathroom))   Assist Device Roller Walker   Gait Pattern Inconsistant Maria Victoria;Slow Maria Victoria;Decreased foot clearance;Narrow ILIANA;Step through;Improper weight shift   Limitations Noted In Balance;Coordination;Device Management;Endurance;Heel Strike;Posture;Safety;Sequencing;Speed;Strength   Provided Assistance with: Balance;Direction   Walk Assist Level Minimum Assist   Findings min A level surfaces; increased visual/tactile cues for safety and direction   Does the patient walk? 2. Yes   Toilet Transfer   Type of Assistance Needed Physical assistance   Physical Assistance Level 26%-50%   Comment min A; cues for sequence   Toilet Transfer CARE Score 3   Therapeutic Interventions   Strengthening supine ther ex; increased time to complete; AAROM   Balance gait and transfer training   Assessment   Treatment Assessment Patient agreeable to therapy session.   No pain reported.   Increased visual/tactile cues requires for all tasks.  Patient also requires frequent cues for general safety as well as Finley management.    Patient needs increased time to complete tasks.    Incontinent of loose BM requiring total assist to clean up.   Completed ther ex for general LE strengthening; gait and transfer training focusing on sequence and technique for improved balance and safety with functional mobility using walker.   PT Barriers   Physical Impairment Decreased strength;Decreased range of motion;Decreased endurance;Impaired balance;Decreased mobility;Decreased coordination;Decreased cognition;Impaired judgement;Decreased safety awareness;Impaired hearing   Functional Limitation Wheelchair management;Walking;Transfers;Standing;Stair  negotiation;Ramp negotiation;Car transfers   Plan   Treatment/Interventions Functional transfer training;LE strengthening/ROM;Therapeutic exercise;Bed mobility;Gait training   Progress Progressing toward goals   PT Therapy Minutes   PT Time In 0658   PT Time Out 0804   PT Total Time (minutes) 66   PT Mode of treatment - Individual (minutes) 66   PT Mode of treatment - Concurrent (minutes) 0   PT Mode of treatment - Group (minutes) 0   PT Mode of treatment - Co-treat (minutes) 0   PT Mode of Treatment - Total time(minutes) 66 minutes   PT Cumulative Minutes 367   Therapy Time missed   Time missed? No

## 2024-02-26 NOTE — NURSING NOTE
Tolerated therapy.  Remains aphasic, wife at bedside most of the day.   Pt in no distress.  Pt assist of 1-2 with direction OOB to chair.  Safety maintained at all times.  Pt in no distress.

## 2024-02-27 PROCEDURE — 97530 THERAPEUTIC ACTIVITIES: CPT

## 2024-02-27 PROCEDURE — 97110 THERAPEUTIC EXERCISES: CPT

## 2024-02-27 PROCEDURE — 97537 COMMUNITY/WORK REINTEGRATION: CPT

## 2024-02-27 PROCEDURE — 97129 THER IVNTJ 1ST 15 MIN: CPT

## 2024-02-27 PROCEDURE — 97116 GAIT TRAINING THERAPY: CPT

## 2024-02-27 PROCEDURE — 92526 ORAL FUNCTION THERAPY: CPT | Performed by: NURSE PRACTITIONER

## 2024-02-27 PROCEDURE — 97535 SELF CARE MNGMENT TRAINING: CPT

## 2024-02-27 RX ADMIN — NYSTATIN 500000 UNITS: 100000 SUSPENSION ORAL at 17:30

## 2024-02-27 RX ADMIN — SERTRALINE HYDROCHLORIDE 25 MG: 25 TABLET ORAL at 08:20

## 2024-02-27 RX ADMIN — NYSTATIN 500000 UNITS: 100000 SUSPENSION ORAL at 11:46

## 2024-02-27 RX ADMIN — Medication 1 TABLET: at 08:20

## 2024-02-27 RX ADMIN — NYSTATIN 500000 UNITS: 100000 SUSPENSION ORAL at 08:20

## 2024-02-27 RX ADMIN — PRAVASTATIN SODIUM 40 MG: 40 TABLET ORAL at 17:30

## 2024-02-27 RX ADMIN — LORATADINE 10 MG: 10 TABLET ORAL at 08:20

## 2024-02-27 RX ADMIN — Medication 6 MG: at 21:04

## 2024-02-27 RX ADMIN — NYSTATIN 500000 UNITS: 100000 SUSPENSION ORAL at 21:04

## 2024-02-27 RX ADMIN — ACETAMINOPHEN 975 MG: 325 TABLET ORAL at 05:08

## 2024-02-27 RX ADMIN — ENOXAPARIN SODIUM 40 MG: 40 INJECTION SUBCUTANEOUS at 08:20

## 2024-02-27 RX ADMIN — ACETAMINOPHEN 975 MG: 325 TABLET ORAL at 21:04

## 2024-02-27 RX ADMIN — TAMSULOSIN HYDROCHLORIDE 0.4 MG: 0.4 CAPSULE ORAL at 17:30

## 2024-02-27 NOTE — PLAN OF CARE
Problem: PAIN - ADULT  Goal: Verbalizes/displays adequate comfort level or baseline comfort level  Description: Interventions:  - Encourage patient to monitor pain and request assistance  - Assess pain using appropriate pain scale  - Administer analgesics based on type and severity of pain and evaluate response  - Implement non-pharmacological measures as appropriate and evaluate response  - Consider cultural and social influences on pain and pain management  - Notify physician/advanced practitioner if interventions unsuccessful or patient reports new pain  Outcome: Progressing     Problem: INFECTION - ADULT  Goal: Absence or prevention of progression during hospitalization  Description: INTERVENTIONS:  - Assess and monitor for signs and symptoms of infection  - Monitor lab/diagnostic results  - Monitor all insertion sites, i.e. indwelling lines, tubes, and drains  - Monitor endotracheal if appropriate and nasal secretions for changes in amount and color  - Vernon appropriate cooling/warming therapies per order  - Administer medications as ordered  - Instruct and encourage patient and family to use good hand hygiene technique  - Identify and instruct in appropriate isolation precautions for identified infection/condition  Outcome: Progressing  Goal: Absence of fever/infection during neutropenic period  Description: INTERVENTIONS:  - Monitor WBC    Outcome: Progressing

## 2024-02-27 NOTE — PROGRESS NOTES
02/27/24 1130   Pain Assessment   Pain Assessment Tool 0-10   Pain Score No Pain   Restrictions/Precautions   Precautions Aspiration;Bed/chair alarms;Cognitive;Fall Risk;Finley;Hard of hearing   Weight Bearing Restrictions No   ROM Restrictions No   Eating   Type of Assistance Needed Supervision   Physical Assistance Level No physical assistance   Comment pt able to self feed all desired components of meal, approx 75%, after orientation to spoon and items on tray; pt reached for provalve cup without cueing as well; visual and physical cues to swallow twice after each bite of meal to prevent coughing/clear throat which pt completed effectively and referred to OT for cues after each bite as well; pt's wife present during meal and also provided visual and physical cueing when needed to continue encouraging pt to self feed   Eating CARE Score 4   Eating Assessment   Food To Mouth Yes   Noted Coughing   Positioning Upright;Out of Bed   Safety Needs Increase Time;Cues   Meal Assessed Lunch   Intake Mode PO   Finishes Timely No   Opens Packages No   Sit to Stand   Type of Assistance Needed Incidental touching   Comment CG during skin check by RN   Sit to Stand CARE Score 4   Exercise Tools   Other Exercise Tool 1 placed popsicle sticks into matching color pocket as prompted by cards, pt required frequent and consistent encouragement and visual cues to continue activity as pt often either lost count of sticks placed or lost attention; pt overall with fair attention to task but >75% accuracy for direction following and working memory   Cognition   Overall Cognitive Status Impaired   Arousal/Participation Alert;Responsive;Cooperative   Attention Attends with cues to redirect   Orientation Level Oriented to person;Oriented to place;Oriented to time   Memory Decreased short term memory;Decreased recall of recent events;Decreased recall of precautions   Following Commands Follows one step commands with increased time or  repetition   Assessment   Treatment Assessment Pt seen for OT session this AM focusing on cognition, fine motor, and lunch meal completion; refer to respective sections for details. Pt continues with impaired sustained attention, problem solving, short term memory, and comprehension that impacts completion of tasks, however did demonstrate improvements in attention this session during tasks. Pt's wife present during session and assisted with lunch tray completion, with pt responding positively. Pt's barriers to d/c include decreased strength throughout, decreased balance, impaired hearing, impaired cognition including safety awareness, problem solving, attention, comprehension, and expression, and decreased activity tolerance; all affect independence in self care and fxl transfers. Pt would benefit from continued skilled OT services in order to address listed barriers and prepare for safe d/c   Prognosis Fair   Problem List Decreased strength;Decreased range of motion;Decreased endurance;Impaired balance;Decreased cognition;Decreased safety awareness;Impaired judgement;Impaired hearing;Decreased coordination   Plan   Treatment/Interventions ADL retraining;Functional transfer training;LE strengthening/ROM;Therapeutic exercise;Endurance training;Cognitive reorientation;Equipment eval/education;Patient/family training;Compensatory technique education;OT   Progress Progressing toward goals   OT Therapy Minutes   OT Time In 1130   OT Time Out 1230   OT Total Time (minutes) 60   OT Mode of treatment - Individual (minutes) 60

## 2024-02-27 NOTE — PROGRESS NOTES
"   02/27/24 1645   Pain Assessment   Pain Assessment Tool 0-10   Pain Score No Pain   Restrictions/Precautions   Precautions Aspiration;Bed/chair alarms;Cognitive;Fall Risk;Hard of hearing;Supervision on toilet/commode   Swallow Assessment   Swallow Treatment Assessment Assessed with dinner tray. Pt's wife and daughter present for family training. Education provided and questions were answered regarding swallow function and POC. Pt's wife was able to complete swallow strategies throughout meal 80% of the time. Will continues family training as patinet continues with a very restrictive diet as well as compensatory strategies that he requires visual cueing for. He was able to feed himself 100% of the time throughout the meal. Head posture into neck extension at times despite tactile cue and visual cue. He continues with a delayed swallow initiation. Variable dry cough throughout meal tray at times but mild. Overall with use of strict aspiration precautions and strict compensatory strategies he is tolerating this diet well. Will continue to work towards goal of diet advancement. Noted baseline diet from his initial video barium swallow study ( 10/5/23) was \"dysphagia level 2 solids and thin liquids).   Eating   Type of Assistance Needed Supervision  (visual cues)   Physical Assistance Level No physical assistance   Eating CARE Score 4      SLP Therapy Minutes   SLP Time In 1645   SLP Time Out 1745   SLP Total Time (minutes) 60   SLP Mode of treatment - Individual (minutes) 60   SLP Mode of treatment - Concurrent (minutes) 0   SLP Mode of treatment - Group (minutes) 0   SLP Mode of treatment - Co-treat (minutes) 0   SLP Mode of Treatment - Total time(minutes) 60 minutes   SLP Cumulative Minutes 370       "

## 2024-02-27 NOTE — PROGRESS NOTES
02/27/24 0900   Pain Assessment   Pain Assessment Tool 0-10   Pain Score No Pain   Restrictions/Precautions   Precautions Aspiration;Bed/chair alarms;Fall Risk;Finley;Hard of hearing;Supervision on toilet/commode;Impulsive   Cognition   Overall Cognitive Status Impaired   Arousal/Participation Alert;Responsive;Cooperative   Attention Attends with cues to redirect   Orientation Level Oriented to person;Oriented to place;Oriented to time  (using white board with choices)   Memory Decreased short term memory;Decreased recall of recent events;Decreased recall of precautions   Following Commands Follows one step commands with increased time or repetition   Sit to Stand   Type of Assistance Needed Incidental touching   Comment cg with RW and increased visual/tactile cues   Sit to Stand CARE Score 4   Bed-Chair Transfer   Type of Assistance Needed Incidental touching   Comment cg with RW and increased visual/tactile cues   Chair/Bed-to-Chair Transfer CARE Score 4   Walk 10 Feet   Type of Assistance Needed Incidental touching   Comment cg level and unlevel surfaces with RW and increased visual and tactile cues for sequence and direction   Walk 10 Feet CARE Score 4   Walk 50 Feet with Two Turns   Type of Assistance Needed Incidental touching   Comment cg level and unlevel surfaces with RW and increased visual and tactile cues for sequence and direction   Walk 50 Feet with Two Turns CARE Score 4   Walk 150 Feet   Type of Assistance Needed Incidental touching   Comment cg level and unlevel surfaces with RW and increased visual and tactile cues for sequence and direction   Walk 150 Feet CARE Score 4   Walking 10 Feet on Uneven Surfaces   Type of Assistance Needed Incidental touching   Comment cg level and unlevel surfaces with RW and increased visual and tactile cues for sequence and direction   Walking 10 Feet on Uneven Surfaces CARE Score 4   Ambulation   Primary Mode of Locomotion Prior to Admission Walk   Distance Walked  (feet) 167 ft  (167' 134')   Assist Device Roller Walker   Gait Pattern Inconsistant Maria Victoria;Slow Maria Victoria;Decreased foot clearance;Narrow ILIANA;Improper weight shift   Limitations Noted In Balance;Coordination;Device Management;Endurance;Heel Strike;Posture;Safety;Sequencing;Strength   Provided Assistance with: Balance;Direction   Walk Assist Level Contact Guard   Findings cg level and unlevel surfaces with RW and increased visual and tactile cues for sequence and direction   Does the patient walk? 2. Yes   Curb or Single Stair   Style negotiated Single stair   Type of Assistance Needed Incidental touching   Comment cg 7 steps with 2 handrails; increased verbal/tactile cues for sequence and direction   1 Step (Curb) CARE Score 4   4 Steps   Type of Assistance Needed Incidental touching   Comment cg 7 steps with 2 handrails; increased verbal/tactile cues for sequence and direction   4 Steps CARE Score 4   Stairs   Type Stairs   # of Steps 7   Weight Bearing Precautions WBAT;Fall Risk   Assist Devices Bilateral Rail   Findings cg 7 steps with 2 handrails; increased verbal/tactile cues for sequence and direction   Toilet Transfer   Type of Assistance Needed Incidental touching   Comment cg with RW/grab bar; increased visual/tactile cues   Toilet Transfer CARE Score 4   Therapeutic Interventions   Strengthening seated ther ex   Balance gait and transfer training   Other stair negotiation   Assessment   Treatment Assessment Patient agreeable to therapy session.   No pain reported.  Continues to require increased visual/tactile cues for general safety as well as direction and task sequence.   Communication also remains limited with inconsistent use of white board.     Completed ther ex for general LE strengthening; gait and transfer training focusing on sequence and technique for improved balance and safety with functional mobility using walker.   Negotiated steps with 2 handrails and cues for sequence/direction.  Returned  to room in recliner with all needs within reach and chair alarm on.   PT Barriers   Physical Impairment Decreased strength;Decreased range of motion;Decreased endurance;Impaired balance;Decreased mobility;Decreased coordination;Decreased cognition;Impaired judgement;Decreased safety awareness;Impaired hearing   Functional Limitation Walking;Transfers;Standing;Stair negotiation;Ramp negotiation;Car transfers   Plan   Treatment/Interventions Functional transfer training;LE strengthening/ROM;Elevations;Therapeutic exercise;Gait training   Progress Progressing toward goals   PT Therapy Minutes   PT Time In 0900   PT Time Out 1000   PT Total Time (minutes) 60   PT Mode of treatment - Individual (minutes) 60   PT Mode of treatment - Concurrent (minutes) 0   PT Mode of treatment - Group (minutes) 0   PT Mode of treatment - Co-treat (minutes) 0   PT Mode of Treatment - Total time(minutes) 60 minutes   PT Cumulative Minutes 498   Therapy Time missed   Time missed? No

## 2024-02-27 NOTE — PCC NURSING
2/27/2024  Pt. Admit after ICH/falls. Pt. Is deaf. Communicates via white board and handouts. Does occasionally say one or two words to questions asked, answers are appropriate. Requires close supervision  when eating and requires special cups to drink with- on nectar thick fluids.  Follows commands. Pills crushed and in applesauce. Has failed urinary retention protocol and has a brooks catheter in place now. Has had small amount of bleeding at meatus and in bag. Reported to earlier shift that tip of penis was irritated. Has not reported this to myself. Mediplex to sacrum and heels as prevention.   3/4/24 Pt On nectar thick  fluids uses special cups. Supervision for all meals wife is cleared by speech to sit with PT during meals. Brooks back in place failed voiding trial.     3/12/2024 A/O x 2-3, pt. Is starting to be more verbal and clearer with words. Communication done with white board. Had VBS done on 3-11, now on Modified Dysphagia diet 1-pureed with nectar thick fluids, medications crushed and given in applesauce. Failed urinary retention trial again and now has brooks in. Developed Hypotensive episodes which improved after fluids, currently receiving NSS at 75ml/hr. Preventive allevny on buttocks and both heels. Scrotum is pink, cream applied. Meds adjusted for on-going thrush. 4 SR up for safety and bed/tab alarms on.     3/18/2024  Uses white board for basic communication. A/O x 2-3. Dysphagia diet nectar thick fluids. Pt on antibiotics (Levaquin) x5 days. Preventative allevyn on heels and buttocks. Repo q 2hrs.     3/25/2024: Patient is A+Ox2-3. Patient utilizes white board for communication. Patient able to communicate needs with 2-word sentences at times. Dysphagia diet, nectar thick fluids, meds crushed in applesauce. Transfers with SV/close CG and ambulates to bathroom SV with RW. Patient can be impulsive, utilizing bed/chair alarms. Preventative allevyn on sacrum and bilateral heels. Brooks removed 3/22.  Patient continues with PVR checks.     4/1/24 Patient is A+Ox2-3. Patient utilizes white board for communication. Patient able to communicate needs with 2-word sentences at times. Dysphagia diet, nectar thick fluids, meds crushed in applesauce. Transfers with SV/close CG and ambulates to bathroom SV with RW. Patient can be impulsive, utilizing bed/chair alarms. Preventative allevyn on sacrum and bilateral heels. Finley removed 3/22.

## 2024-02-27 NOTE — NURSING NOTE
Patient transfers assist x 1 with walker. Needs cueing and direction for all tasks. Reports no pain but unsure if fully answering properly. Did not point at yes or no but shook head. Sitting out of bed for all meals. Direct supervision with all meals. Coughing at times intermittently. Took pills crushed in applesauce. Able to swallow nystatin on spoon. Had one episode of bowel incontinance. Finley catheter in place draining yellow urine. Finley cath care completed. Patient grimacing when completed around site. No bleeding noted. Resting in bed between therapies. Educated  to reposition self. Alarms in place and side rails has wife request. Will continue to monitor and follow plan of care.

## 2024-02-27 NOTE — PROGRESS NOTES
Progress Note - Thomas Chase 83 y.o. male MRN: [id number]    Unit/Bed#: [facility] 213-02 Encounter: [id number]        Subjective:   Patient seen and examined at bedside after reviewing the chart and discussing the case with the caring staff.      Patient examined at bedside.  Patient has no reported acute symptoms.      Physical Exam   Vitals: Blood pressure 135/74, pulse 77, temperature (!) 97.3 °F (36.3 °C), temperature source Temporal, resp. rate 17, height 6' (1.829 m), weight 56.6 kg (124 lb 12.5 oz), SpO2 97%.,Body mass index is 16.92 kg/m².  Constitutional: Patient in no acute distress.  HEENT: PERR, EOMI, MMM.  Cardiovascular: Normal rate and regular rhythm.    Pulmonary/Chest: Effort normal and breath sounds normal.   Abdomen: Soft, + BS, NT.    Assessment/Plan:  Thomas Chase is a(n) 83 y.o. year old male with left SDH and right SAH.     1.  Cardiac with hx of HTN, HLD.  BP appears stable.  On pravastatin 40 mg daily (simvastatin nonformulary).  2.  Allergic rhinitis.  Patient is on loratadine 10 mg daily.  3.  Depression/anxiety.  Patient is on Zoloft 25 mg daily.  4.  Insomnia.  Patient is on melatonin 6 mg at bedtime.  5.  Thrush.  Nystatin liquid swish and swallow 4 times daily.  6.  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  Patient is mainly nonverbal.  7.  Urinary retention.  Urinary retention protocol.  Started on Flomax 0.4 mg daily 2/21/24.  Urology consulted.  Finley catheter placed 2/22/24.  Per urology, continue Flomax for 5-7 days before repeating voiding trial.   8.  Dysphagia.  Speech therapy following.  Dysphagia 1 puureed with nectar thick liquid.  9.  Pain and bowel management.  As per physiatrist.  10.  Intracranial hemorrhage.   Patient will be receiving intensive PT OT ST as per physiatrist.    Anticipated discharge date:  Friday 3/8/24  PCP:  RONY Diaz

## 2024-02-27 NOTE — PLAN OF CARE
Problem: PAIN - ADULT  Goal: Verbalizes/displays adequate comfort level or baseline comfort level  Description: Interventions:  - Encourage patient to monitor pain and request assistance  - Assess pain using appropriate pain scale  - Administer analgesics based on type and severity of pain and evaluate response  - Implement non-pharmacological measures as appropriate and evaluate response  - Consider cultural and social influences on pain and pain management  - Notify physician/advanced practitioner if interventions unsuccessful or patient reports new pain  Outcome: Progressing     Problem: INFECTION - ADULT  Goal: Absence or prevention of progression during hospitalization  Description: INTERVENTIONS:  - Assess and monitor for signs and symptoms of infection  - Monitor lab/diagnostic results  - Monitor all insertion sites, i.e. indwelling lines, tubes, and drains  - Monitor endotracheal if appropriate and nasal secretions for changes in amount and color  - [place] appropriate cooling/warming therapies per order  - Administer medications as ordered  - Instruct and encourage patient and family to use good hand hygiene technique  - Identify and instruct in appropriate isolation precautions for identified infection/condition  Outcome: Progressing  Goal: Absence of fever/infection during neutropenic period  Description: INTERVENTIONS:  - Monitor WBC    Outcome: Progressing     Problem: Nutrition/Hydration-ADULT  Goal: Nutrient/Hydration intake appropriate for improving, restoring or maintaining nutritional needs  Description: Monitor and assess patient's nutrition/hydration status for malnutrition. Collaborate with interdisciplinary team and initiate plan and interventions as ordered.  Monitor patient's weight and dietary intake as ordered or per policy. Utilize nutrition screening tool and intervene as necessary. Determine patient's food preferences and provide high-protein, high-caloric foods as appropriate.      INTERVENTIONS:  - Monitor oral intake, urinary output, labs, and treatment plans  - Assess nutrition and hydration status and recommend course of action  - Evaluate amount of meals eaten  - Assist patient with eating if necessary   - Allow adequate time for meals  - Recommend/ encourage appropriate diets, oral nutritional supplements, and vitamin/mineral supplements  - Order, calculate, and assess calorie counts as needed  - Recommend, monitor, and adjust tube feedings and TPN/PPN based on assessed needs  - Assess need for intravenous fluids  - Provide specific nutrition/hydration education as appropriate  - Include patient/family/caregiver in decisions related to nutrition  Outcome: Progressing

## 2024-02-28 PROCEDURE — 97535 SELF CARE MNGMENT TRAINING: CPT

## 2024-02-28 PROCEDURE — 97116 GAIT TRAINING THERAPY: CPT

## 2024-02-28 PROCEDURE — 97530 THERAPEUTIC ACTIVITIES: CPT

## 2024-02-28 PROCEDURE — 97537 COMMUNITY/WORK REINTEGRATION: CPT

## 2024-02-28 PROCEDURE — 99233 SBSQ HOSP IP/OBS HIGH 50: CPT | Performed by: PHYSICAL MEDICINE & REHABILITATION

## 2024-02-28 PROCEDURE — 97110 THERAPEUTIC EXERCISES: CPT

## 2024-02-28 PROCEDURE — 92526 ORAL FUNCTION THERAPY: CPT | Performed by: NURSE PRACTITIONER

## 2024-02-28 RX ORDER — ACETAMINOPHEN 325 MG/1
650 TABLET ORAL EVERY 6 HOURS PRN
Status: DISCONTINUED | OUTPATIENT
Start: 2024-02-28 | End: 2024-02-28

## 2024-02-28 RX ORDER — ACETAMINOPHEN 325 MG/1
650 TABLET ORAL EVERY 6 HOURS PRN
Status: DISCONTINUED | OUTPATIENT
Start: 2024-02-28 | End: 2024-04-05 | Stop reason: HOSPADM

## 2024-02-28 RX ADMIN — NYSTATIN 500000 UNITS: 100000 SUSPENSION ORAL at 08:49

## 2024-02-28 RX ADMIN — TAMSULOSIN HYDROCHLORIDE 0.4 MG: 0.4 CAPSULE ORAL at 17:30

## 2024-02-28 RX ADMIN — Medication 1 TABLET: at 08:49

## 2024-02-28 RX ADMIN — ENOXAPARIN SODIUM 40 MG: 40 INJECTION SUBCUTANEOUS at 08:49

## 2024-02-28 RX ADMIN — LORATADINE 10 MG: 10 TABLET ORAL at 08:49

## 2024-02-28 RX ADMIN — ACETAMINOPHEN 975 MG: 325 TABLET ORAL at 05:42

## 2024-02-28 RX ADMIN — NYSTATIN 500000 UNITS: 100000 SUSPENSION ORAL at 17:32

## 2024-02-28 RX ADMIN — NYSTATIN 500000 UNITS: 100000 SUSPENSION ORAL at 21:36

## 2024-02-28 RX ADMIN — PRAVASTATIN SODIUM 40 MG: 40 TABLET ORAL at 17:30

## 2024-02-28 RX ADMIN — SERTRALINE HYDROCHLORIDE 25 MG: 25 TABLET ORAL at 08:49

## 2024-02-28 RX ADMIN — NYSTATIN 500000 UNITS: 100000 SUSPENSION ORAL at 13:00

## 2024-02-28 RX ADMIN — Medication 6 MG: at 21:29

## 2024-02-28 NOTE — PROGRESS NOTES
02/28/24 0856   Pain Assessment   Pain Assessment Tool 0-10   Pain Score No Pain   Restrictions/Precautions   Precautions Aspiration;Bed/chair alarms;Cognitive;Fall Risk;Finley;Hard of hearing;Supervision on toilet/commode   Weight Bearing Restrictions No   ROM Restrictions No   Cognition   Overall Cognitive Status Impaired   Arousal/Participation Alert;Responsive;Cooperative   Attention Attends with cues to redirect   Orientation Level Oriented to person;Oriented to place;Oriented to time  (using white board with choices)   Memory Decreased short term memory;Decreased recall of recent events;Decreased recall of precautions   Following Commands Follows one step commands with increased time or repetition   Sit to Lying   Type of Assistance Needed Supervision   Comment bed rail; increased visual/tactile cues   Sit to Lying CARE Score 4   Sit to Stand   Type of Assistance Needed Incidental touching   Comment cg with RW and increased visual/tactile cues   Sit to Stand CARE Score 4   Bed-Chair Transfer   Type of Assistance Needed Incidental touching   Comment cg with RW and increased visual/tactile cues   Chair/Bed-to-Chair Transfer CARE Score 4   Walk 10 Feet   Type of Assistance Needed Incidental touching   Comment cg level and unlevel surfaces with RW and increased visual and tactile cues for sequence and direction   Walk 10 Feet CARE Score 4   Walk 50 Feet with Two Turns   Type of Assistance Needed Incidental touching   Comment cg level and unlevel surfaces with RW and increased visual and tactile cues for sequence and direction   Walk 50 Feet with Two Turns CARE Score 4   Walk 150 Feet   Type of Assistance Needed Incidental touching   Comment cg level and unlevel surfaces with RW and increased visual and tactile cues for sequence and direction   Walk 150 Feet CARE Score 4   Walking 10 Feet on Uneven Surfaces   Type of Assistance Needed Incidental touching   Comment cg level and unlevel surfaces with RW and  increased visual and tactile cues for sequence and direction   Walking 10 Feet on Uneven Surfaces CARE Score 4   Ambulation   Primary Mode of Locomotion Prior to Admission Walk   Distance Walked (feet) 186 ft  (186' 134' 146')   Assist Device Roller Walker   Gait Pattern Inconsistant Maria Victoria;Slow Maria Victoria;Decreased foot clearance;Narrow ILIANA;Improper weight shift   Limitations Noted In Balance;Coordination;Device Management;Endurance;Heel Strike;Posture;Safety;Sequencing;Strength   Provided Assistance with: Balance;Direction   Walk Assist Level Contact Guard   Findings cg level and unlevel surfaces with RW and increased visual and tactile cues for sequence and direction   Does the patient walk? 2. Yes   Curb or Single Stair   Style negotiated Single stair   Type of Assistance Needed Incidental touching   Comment cg steps with 2 handrails; increased visual/tactile cues for sequence and direction   1 Step (Curb) CARE Score 4   4 Steps   Type of Assistance Needed Incidental touching   Comment cg steps with 2 handrails; increased visual/tactile cues for sequence and direction   4 Steps CARE Score 4   Stairs   Type Stairs;Ramp   # of Steps 7   Weight Bearing Precautions WBAT;Fall Risk   Assist Devices Bilateral Rail;Roller Walker   Findings cg steps with 2 handrails; increased visual/tactile cues for sequence and direction; cg/min A ramp with RW   Therapeutic Interventions   Strengthening supine and seated ther ex   Balance gait and transfer training   Other ramp and stair negotiation   Assessment   Treatment Assessment Patient agreeable to therapy session.  No pain reported.   Continues to require ongoing visual/tactile cues for general safety, direction, and task sequence.   Unable to manage Finley catheter.   Communication remains inconsistent using white board.   Trialed ramp today with positive results.  Completed ther ex for general LE strengthening; gait and transfer training focusing on sequence and technique for  improved balance and safety with functional mobility using walker.  Negotiated ramp cg/min A with RW; cg steps with 2 handrails and cues for sequence/technique.   PT Barriers   Physical Impairment Decreased strength;Decreased range of motion;Decreased endurance;Impaired balance;Decreased mobility;Decreased coordination;Decreased cognition;Impaired judgement;Decreased safety awareness;Impaired hearing   Functional Limitation Wheelchair management;Walking;Transfers;Standing;Stair negotiation;Ramp negotiation;Car transfers   Plan   Treatment/Interventions Functional transfer training;LE strengthening/ROM;Elevations;Therapeutic exercise;Bed mobility;Gait training   Progress Progressing toward goals   PT Therapy Minutes   PT Time In 0856   PT Time Out 1022   PT Total Time (minutes) 86   PT Mode of treatment - Individual (minutes) 86   PT Mode of treatment - Concurrent (minutes) 0   PT Mode of treatment - Group (minutes) 0   PT Mode of treatment - Co-treat (minutes) 0   PT Mode of Treatment - Total time(minutes) 86 minutes   PT Cumulative Minutes 644   Therapy Time missed   Time missed? No

## 2024-02-28 NOTE — NURSING NOTE
Patient unable to urinate. Bladder scanned for 267 ml. Patient then had a large BM and urinated 50 ml. Bladder scan again for 174 ml. Message sent to Dr. Boyer to update.

## 2024-02-28 NOTE — NURSING NOTE
Patient with pain at urethra during brooks cath care. Dr. Paz asked this nurse to reach out to Dr. Boyer in regards to trial brooks removal. Tiger message sent and awaiting return response. Fluids encouraged at breakfast and during therapy for low BP in AM. Patient assymptomatic. Will continue to monitor.

## 2024-02-28 NOTE — TEAM CONFERENCE
Acute RehabilitationTeam Conference Note  Date: 2/28/2024   Time: 11:02 AM       Patient Name:  Thomas Chase       Medical Record Number: [id number]   YOB: 1940  Sex: Male          Room/Bed:  [facility]-02  Payor Info:  Payor: MEDICARE / Plan: MEDICARE A AND B / Product Type: Medicare A & B Fee for Service /      Admitting Diagnosis: Cl fracture base skull wth intracran hemorrhage, no loss consciousness (HCC) [S02.109A, S06.300A]   Admit Date/Time:  2/20/2024  3:13 PM  Admission Comments: No comment available     Primary Diagnosis:  Intracranial hemorrhage (HCC)  Principal Problem: Intracranial hemorrhage (HCC)    Patient Active Problem List    Diagnosis Date Noted    Headache 02/20/2024    Behavior safety risk 02/20/2024    Bilateral lower extremity weakness 02/17/2024    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    Abnormal CT scan, lumbar spine 02/15/2024    Severe protein-calorie malnutrition ([facility]) 02/14/2024    Debility 02/13/2024    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    Dysphagia 11/21/2023    Macular degeneration, age related 02/27/2023    Traumatic subdural hemorrhage with loss of consciousness of unspecified duration, initial encounter ([facility]) 02/27/2023    Sensorineural hearing loss (SNHL), bilateral 08/18/2022    Balance disorder 06/28/2022    Abnormal echocardiogram 06/27/2022    Right bundle-branch block 06/27/2022    Moderate protein-calorie malnutrition (HCC) 06/19/2022    Hypotension 06/19/2022    Diastolic dysfunction 06/19/2022    EKG abnormalities 06/19/2022    Constipation 06/02/2022    SDH (subdural hematoma) ([facility]) 05/27/2022    Primary osteoarthritis of left knee 05/17/2022    Hygroma 04/26/2022    Right hand pain     Carpal tunnel syndrome on right     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[facility]) 09/02/2021    Overweight (BMI 25.0-29.9) 01/27/2021    Negative depression screening 09/26/2019    Hypercholesterolemia 07/12/2018    Borderline hypertension 07/12/2018    Hearing loss  04/08/2009       Physical Therapy:    Weight Bearing Status: Weight Bearing as Tolerated  Transfers: Incidental Touching  Bed Mobility: Incidental Touching, Supervision  Amulation Distance (ft): 167 feet  Ambulation: Incidental Touching  Assistive Device for Ambulation: Roller Walker  Wheelchair Mobility Distance: 133 ft  Wheelchair Mobility: Maximum Assistance (max visual and tactile cues)  Number of Stairs: 7  Assistive Device for Stairs: Bilateral Hand Rails  Stair Assistance: Incidental Touching  Ramp:  (TBA as able)  Assistive Device for Ramp:  (TBA as able)  Discharge Recommendations: Home with:  DC Home with:: 24 Hour Assisteance, Family Support, First Floor Setup, Home Physical Therapy, Outpatient Physical Therapy     02.21/2024:   Patient seen today for IE.  Presents, following hospitalizaton  for  traumatic brain dysfunction (L SDH, R SAH, L anterior hypodensity lateral temporal lobe) and PMH significant for previous fall with TBI (SDH with L craniotomy), chronic aphasia, hard of hearing with cochlear implants in place, and hyperlipidemia.    Patient MIN A bed mobility, MOD A transfers with walker, MAX A wheelchair mobility level and unlevel surfaces up to 133' with increased visual and tactile cues, min-mod A ambulation up to 66' level and unlevel surfaces with walker and wheelchair follow.  Patient limited by decreased ROM/strength, decreased balance and safety, decreased endurance, impaired  cognition, and impaired hearing (patient wears cochlear implants).   May benefit from continued inpatient [facility]C PT to increase function, safety, and increased independence in prep for safe d/c to home with family and continued PT services as needed.    02/28/2024:   Patient participating in therapy and making positive gains.  Patient CG/S bed mobility, CG transfers with walker, CG ambulation up to 167' level and unlevel surfaces with walker, cg negotiation of 7 steps with 2 handrails.   Patient requires increased  visual and tactile cues for general safety, Finley management, and task sequence.  Patient also remains limited by decreased ROM/strength, decreased balance and safety, decreased endurance, impaired cognition, aphasia, and impaired hearing.  Patient may benefit from continued inpatient [facility] PT to increase function, safety, and increased independence in prep for safe d/c to home with family and continued PT services.    Occupational Therapy:  Eating: Moderate Assistance  Grooming: Supervision  Bathing: Minimal Assistance  Bathing: Minimal Assistance  Upper Body Dressing: Minimal Assistance  Lower Body Dressing: Maximum Assistance  Toileting: Maximum Assistance  Tub/Shower Transfer: Maximum Assistance  Toilet Transfer: Minimal Assistance  Cognition: Exceptions to WNL  Cognition: Decreased Memory, Decreased Executive Functions, Decreased Attention, Decreased Comprehension, Decreased Safety  Orientation: Person, Place, Situation  Discharge Recommendations: Home with:  DC Home with:: Family Support, First Floor Setup, Home Occupational Therapy, 24 Hour Supervision       2/21/24: Pt new admit to ARU. Current LOF listed above. Barriers to d/c include decreased strength throughout, decreased balance, impaired cognition including safety awareness, problem solving, attention, comprehension, expression, and short term memory, and decreased activity tolerance; all affect independence in self care and fxl transfers. Pt will participate in ADL training, therapeutic exercises and activities, fxl mobility/transfers, cognitive training, and activity tolerance in order to progress towards goals. Pt would benefit from continued skilled OT services in order to address listed barriers and prepare for safe d/c.     2/28/24: Pt's current LOF listed above. Continues with barriers to d/c of decreased strength throughout, decreased balance, impaired cognition including safety awareness, problem solving, attention, comprehension, expression,  and short term memory, and decreased activity tolerance; all affect independence in self care and fxl transfers. Pt participating in ADL training, therapeutic exercises and activities, fxl mobility/transfers, cognitive training, and activity tolerance in order to progress towards goals. Pt would benefit from continued skilled OT services in order to address listed barriers and prepare for safe d/c.      Speech Therapy:           2/21 per nursing patient coughing with thin liquids. Diet downgraded to nectar thick liquids, consult pending this date.     2/28     Cognitive Linguisitic Assessments   Cognitive Linquistic Quick Test (CLQT) BCAT 8/21- pt only able to participate via white board 2' hearing loss     Comprehension   Assist Devices Other  (cochlear implants)   Auditory    (white board for communication)   QI: Comprehension 2. Sometimes Understands: Understands only basic conversations or simple, direct phrases. Frequently requires cues to understand   Comprehension (FIM) 2 - Understands only simple expressions or gestures (waves, hello)   Expression   Non-Verbal Basic   QI: Expression 2. Frequently exhibits difficulty with expressing needs and ideas   Expression (FIM) 2 - Uses only simple expressions or gestures (waves, hello)   Social Interaction   Social Interaction (FIM) 2 - Interacts appropriately 25-49% of time   Problem Solving   Problem solving (FIM) 2 - Solves basic problems 25-49% of time   Memory   Memory (FIM) 2 - Recognizes, recalls/performs 25-49%   Memory Skills   Orientation Level Oriented to person   Consistencies Assessed and Performance   Materials Admnistered Puree/Level 1;Nectar thick liquid   Oral Stage Mild impaired   Phargngeal Stage Moderate impaired   Swallow Mechanics Mild delayed;Swallow initation;Weak larygneal rise;Aspiration risk   Strategies and Efficacy small bites, small sips, slow rate   Recommendations   Risk for Aspiration Present   Recommendations Consider oral diet   Diet  Solid Recommendation Level 1 Dysphagia/pureed   Diet Liquid Recommendation Nectar thick liquid   Recommended Form of Meds Whole;With thick liquid   General Precautions Aspiration precautions;Feed only when alert;Minimize distractions;Upright as possible for all oral intake;Remain upright for 45 mins after meals   Compensatory Swallowing Strategies Alternate solids and liquids   Results Reviewed with PT/Family/Caregiver     Eating   Type of Assistance Needed Supervision  (visual cues)   Physical Assistance Level No physical assistance   Eating CARE Score 4       Nursing Notes:  Appetite: Fair  Diet Type: Dysphagia I, Nectar thick liquids                      Diet Patient/Family Education Complete: Yes    Type of Wound (LDA):  (protection with allevyn to heels and sacrum, multiple scabs)                    Type of Wound Patient/Family Education: Yes  Bladder: 1 - Total Assistance     Bladder Patient/Family Education: Yes  Bowel: 1 - Total Assistance     Bowel Patient/Family Education: Yes  Pain Location/Orientation: Other (Comment)  Pain Score: 0                       Hospital Pain Intervention(s): Declines  Pain Patient/Family Education: Yes  Medication Management/Safety  Injectable: Lovenox  Safe Administration: Yes  Medication Patient/Family Education Complete: Yes    2/27/2024  Pt. Admit after ICH/falls. Pt. Is deaf. Communicates via white board and handouts. Does occasionally say one or two words to questions asked, answers are appropriate. Requires close supervision  when eating and requires special cups to drink with- on nectar thick fluids.  Follows commands. Pills crushed and in applesauce. Has failed urinary retention protocol and has a brooks catheter in place now. Has had small amount of bleeding at meatus and in bag. Reported to earlier shift that tip of penis was irritated. Has not reported this to myself. Mediplex to sacrum and heels as prevention.     Case Management:     Discharge Planning  Living  Arrangements: Lives w/ Spouse/significant other  Support Systems: Spouse/significant other, Son, Daughter  Assistance Needed: unknown  Type of Current Residence: Private residence  Current Home Care Services: No  Initial assessment & orientation to ARC with Pt & his wife, who was visiting bedside. Pt had difficulty hearing this worker, but his wife expressed that she is in agreement with tentative team recommendations. Tx team meeting was held today & tentative target DC date is 3/8, with home health services (all services). Discussed role of team members & reviewed TX & DC planning with Pt & Pt's spouse, who expressed understanding & agreement. Pt's additional family also visiting today & also present when physician assessed Pt.  SW will continue to monitor & assist as needed with TX & DC planning.     Is the patient actively participating in therapies? yes  List any modifications to the treatment plan: na    Barriers Interventions   Recent brain bleed, urinary retention, neurogenic bladder, HTN Medical management and oversight, brooks removed   Incontinent bowel  Timed schedule   Impaired skin integrity Allevan for protection   Decreased cog, safety, dysphagia Cues, ST strategies, ADL/transfer/gait training, puree with nectar, strict aspiration precautions   Decreased balance Therapeutic exercise, therapeutic activity     Is the patient making expected progress toward goals? yes  List any update or changes to goals: na    Medical Goals: Patient will be able to manage medical conditions and comorbid conditions with medications and follow up upon completion of rehab program    Weekly Team Goals:   Rehab Team Goals  ADL Team Goal: Patient will require supervision with ADLs with least restrictive device upon completion of rehab program  Transfer Team Goal: Patient will require assist with transfers with least restrictive device upon completion of rehab program  Locomotion Team Goal: Patient will require assist with  locomotion with least restrictive device upon completion of rehab program  Cognitive Team Goal: Patient will return to premorbid level of cognitive activity upon completion of rehab program    Mod I Bed mobility, S transfers, and S mobility  Mod assist Comprehension, expression, memory, and problem solving   Min assist toileting, LB dressing   Complete family training     Health and wellness: to be able to return home and complete activities with family    Discussion: Plan for return home with S with [facility] for PT, OT, ST, SW, nursing, and aides.  May benefit from additional services at home.    Anticipated Discharge Date:  March 8, 2024

## 2024-02-28 NOTE — PROGRESS NOTES
02/28/24 0700   Pain Assessment   Pain Assessment Tool 0-10   Pain Score No Pain   Restrictions/Precautions   Precautions Aspiration;Bed/chair alarms;Cognitive;Fall Risk;Hard of hearing   Weight Bearing Restrictions No   ROM Restrictions No   Eating   Type of Assistance Needed Supervision   Physical Assistance Level No physical assistance   Comment initial scoop onto spoon to initiate self feed, then successfully self-fed approx 50% of meal before reporting full, continues to initiate reaching for drink cups without cueing; encouraged pt to continue drinking to raise low BP readings; encouraged pt to double swallow after bites to minimize occurrence of coughing, however pt sitll with cough approx 25% of the time   Eating CARE Score 4   Oral Hygiene   Type of Assistance Needed Supervision   Physical Assistance Level No physical assistance   Comment visual cues for thoroughness   Oral Hygiene CARE Score 4   Grooming   Able To Comb/Brush Hair;Wash/Dry Face;Brush/Clean Teeth  (continues with difficulty initiating tasks)   Limitation Noted In Problem Solving;Safety;Sequencing   Shower/Bathe Self   Type of Assistance Needed Physical assistance   Physical Assistance Level 25% or less   Comment assist for buttocks, bilat feet; Finley wipes completed by OT   Shower/Bathe Self CARE Score 3   Bathing   Assessed Bath Style Sponge Bath   Anticipated D/C Bath Style Shower;Sponge Bath   Able to Gather/Transport No   Able to Wash/Rinse/Dry (body part) Left Arm;Right Arm;L Upper Leg;R Upper Leg;Chest;Abdomen;Perineal Area   Limitations Noted in Balance;Endurance;Problem Solving;ROM;Safety;Sequencing;Strength   Positioning Seated;Standing   Upper Body Dressing   Type of Assistance Needed Physical assistance   Physical Assistance Level 51%-75%   Comment assist to doff/don flannel shirts over bilat UE, don/doff t-shirt over head, align new shirt over bilat hands   Upper Body Dressing CARE Score 2   Lower Body Dressing   Type of  Assistance Needed Physical assistance   Physical Assistance Level 76% or more   Comment able to assist in pulling clothing up to knees prior to standing   Lower Body Dressing CARE Score 2   Putting On/Taking Off Footwear   Type of Assistance Needed Physical assistance   Physical Assistance Level 76% or more   Comment able to present bilat feet to OT to doff/don socks   Putting On/Taking Off Footwear CARE Score 2   Dressing/Undressing Clothing   Remove UB Clothes Pullover Shirt;Jacket   Don UB Clothes Pullover Shirt;Jacket   Remove LB Clothes Undergarment;Socks   Don LB Clothes Pants;Undergarment;Socks   Limitations Noted In Balance;Endurance;Problem Solving;Safety;Sequencing;ROM;Strength   Positioning Supported Sit   Findings pt continues with difficulty initiating dressing tasks and does not respond to OT's written and visual cues to assist with dressing   Lying to Sitting on Side of Bed   Type of Assistance Needed Physical assistance   Physical Assistance Level 25% or less   Lying to Sitting on Side of Bed CARE Score 3   Sit to Stand   Type of Assistance Needed Supervision   Physical Assistance Level No physical assistance   Sit to Stand CARE Score 4   Toileting Hygiene   Type of Assistance Needed Physical assistance   Physical Assistance Level Total assistance   Comment bowel incontinence at start of session   Toileting Hygiene CARE Score 1   Cognition   Overall Cognitive Status Impaired   Arousal/Participation Alert;Responsive;Cooperative   Attention Attends with cues to redirect   Orientation Level Oriented to person;Oriented to place;Oriented to time   Memory Decreased short term memory;Decreased recall of recent events;Decreased recall of precautions   Following Commands Follows one step commands with increased time or repetition   Assessment   Treatment Assessment Pt agreeable to OT session this AM. Received lying supine in bed. ADL session completed; current LOF and details listed in respective sections. Pt  is overall total assist fot bowel incontinence/toileting, maxA LB dressing, modA UB dressing, [place] bathing, supervision oral care/grooming; fxl transfers overall supervision with visual cues for safety. Pt continues with difficulty initiating tasks and requires frequent and consistent visual and physical cues to attempt to both initiate and complete tasks of each ADL activity. Pt able to self feed preferred breakfast foods this AM with initial scoop onto spoon from OT; encouraged to drink more to raise low BP readings. Pt required increased time for breakfast meal as breaks taken after each bite. Pt's barriers to d/c include decreased strength throughout, decreased balance, impaired hearing, impaired cognition including safety awareness, problem solving, attention, comprehension, and expression, and decreased activity tolerance; all affect independence in self care and fxl transfers. Pt would benefit from continued skilled OT services in order to address listed barriers and prepare for safe d/c   Prognosis Fair   Problem List Decreased strength;Decreased range of motion;Decreased endurance;Impaired balance;Decreased cognition;Decreased safety awareness;Impaired judgement;Impaired hearing;Decreased coordination   Plan   Treatment/Interventions ADL retraining;LE strengthening/ROM;Functional transfer training;Therapeutic exercise;Endurance training;Cognitive reorientation;Patient/family training;Equipment eval/education;Compensatory technique education;OT   Progress Progressing toward goals   OT Therapy Minutes   OT Time In 0700   OT Time Out 0837   OT Total Time (minutes) 97   OT Mode of treatment - Individual (minutes) 97

## 2024-02-28 NOTE — NURSING NOTE
Patient out of bed with assist x 1 with walker. Needs cueing and direction for all shifts. Continues to be a direct supervision for meals. Needs cueing to swallow. Coughing with meals noted. Unable to void this shift. Will continue on urinary retention protocol. Incontinant of BM this shift. Skin intact to sacrum. Wife at bedside. Encouraged to reposition side to side. Alarms in place. Will continue to monitor and follow plan of care.

## 2024-02-28 NOTE — PROGRESS NOTES
02/28/24 1304   Pain Assessment   Pain Assessment Tool 0-10   Pain Score No Pain   Restrictions/Precautions   Precautions Aspiration;Bed/chair alarms;Cognitive;Fall Risk;Hard of hearing   Weight Bearing Restrictions No   ROM Restrictions No   Eating Assessment   Findings pt finishing lunch upon OT's approach, pt's wife present and providing direct supervision and visual cues to continue self-feeding applesauce   Oral Hygiene   Type of Assistance Needed Supervision   Physical Assistance Level No physical assistance   Comment rinsed mouth after lunch with supervision and assist to hold tray to spit   Oral Hygiene CARE Score 4   Exercise Tools   Hand Gripper 2x30 digit flexion/extension in bilat UE using 7# digi flex   Other Exercise Tool 1 pinched clothespins to hang and remove from rods using RUE, frequent and consistent visual cues to attend to task and continue completing task as pt's sustained attention was initially fair; attention improved as task progressed with less frequent cueing required overall; occasional difficulty pinching most difficult clothespins   Cognition   Overall Cognitive Status Impaired   Arousal/Participation Alert;Cooperative   Attention Attends with cues to redirect   Orientation Level Oriented to person;Oriented to place;Oriented to time   Memory Decreased short term memory;Decreased recall of recent events;Decreased recall of precautions   Following Commands Follows one step commands with increased time or repetition   Assessment   Treatment Assessment Pt seen for brief OT session this PM focusing on meal completion, oral care, and intrinsic hand strength/activity tolerance. Visual cues required during activities for sustained attention, however attention improved as activities progressed. Pt's wife present during session and provided excellent encouragement and cueing as well; pt's wife reports attention has changed since onset of ICH.  Pt's barriers to d/c include decreased strength  throughout, decreased balance, impaired hearing, impaired cognition including safety awareness, problem solving, attention, comprehension, and expression, and decreased activity tolerance; all affect independence in self care and fxl transfers. Pt would benefit from continued skilled OT services in order to address listed barriers and prepare for safe d/c.   Prognosis Fair   Problem List Decreased strength;Decreased range of motion;Decreased endurance;Impaired balance;Decreased cognition;Decreased safety awareness;Impaired judgement;Impaired hearing;Decreased coordination   Plan   Treatment/Interventions ADL retraining;Functional transfer training;LE strengthening/ROM;Therapeutic exercise;Endurance training;Cognitive reorientation;Patient/family training;Equipment eval/education;Compensatory technique education;OT   Progress Progressing toward goals   OT Therapy Minutes   OT Time In 1304   OT Time Out 1330   OT Total Time (minutes) 26   OT Mode of treatment - Individual (minutes) 26

## 2024-02-28 NOTE — NURSING NOTE
Patient to have CT scan on 3/4 at 10:00. [facility] will pick him up at 9:30. Family aware of same.

## 2024-02-28 NOTE — NURSING NOTE
Spoke to neurosurgery and patient will need a CT scan on 3/4 with a inpatient virtual follow up on 3/6. Per office they will put inpatient CT scan order in so it can get completed and will call prior to appt on 3/6 so physician can put inpatient order in for virtual appt.

## 2024-02-28 NOTE — PLAN OF CARE
Problem: PAIN - ADULT  Goal: Verbalizes/displays adequate comfort level or baseline comfort level  Description: Interventions:  - Encourage patient to monitor pain and request assistance  - Assess pain using appropriate pain scale  - Administer analgesics based on type and severity of pain and evaluate response  - Implement non-pharmacological measures as appropriate and evaluate response  - Consider cultural and social influences on pain and pain management  - Notify physician/advanced practitioner if interventions unsuccessful or patient reports new pain  Outcome: Progressing     Problem: INFECTION - ADULT  Goal: Absence or prevention of progression during hospitalization  Description: INTERVENTIONS:  - Assess and monitor for signs and symptoms of infection  - Monitor lab/diagnostic results  - Monitor all insertion sites, i.e. indwelling lines, tubes, and drains  - Monitor endotracheal if appropriate and nasal secretions for changes in amount and color  - [place] appropriate cooling/warming therapies per order  - Administer medications as ordered  - Instruct and encourage patient and family to use good hand hygiene technique  - Identify and instruct in appropriate isolation precautions for identified infection/condition  Outcome: Progressing  Goal: Absence of fever/infection during neutropenic period  Description: INTERVENTIONS:  - Monitor WBC    Outcome: Progressing     Problem: Nutrition/Hydration-ADULT  Goal: Nutrient/Hydration intake appropriate for improving, restoring or maintaining nutritional needs  Description: Monitor and assess patient's nutrition/hydration status for malnutrition. Collaborate with interdisciplinary team and initiate plan and interventions as ordered.  Monitor patient's weight and dietary intake as ordered or per policy. Utilize nutrition screening tool and intervene as necessary. Determine patient's food preferences and provide high-protein, high-caloric foods as appropriate.      INTERVENTIONS:  - Monitor oral intake, urinary output, labs, and treatment plans  - Assess nutrition and hydration status and recommend course of action  - Evaluate amount of meals eaten  - Assist patient with eating if necessary   - Allow adequate time for meals  - Recommend/ encourage appropriate diets, oral nutritional supplements, and vitamin/mineral supplements  - Order, calculate, and assess calorie counts as needed  - Recommend, monitor, and adjust tube feedings and TPN/PPN based on assessed needs  - Assess need for intravenous fluids  - Provide specific nutrition/hydration education as appropriate  - Include patient/family/caregiver in decisions related to nutrition  Outcome: Progressing

## 2024-02-28 NOTE — CASE MANAGEMENT
Tx team recommendations reviewed with patient & family, who expressed understanding & agreement. Target DC date is 3/8 with [facility] (all); a list of providers was provided to Pt & a referral will be made based on Pt preference (expressed interest in [facility] & [facility]). Pt's spouse inquiring if there may be an extension; discussed that this will be reviewed at the team meeting next week. Provided information regarding meal options. SW will continue to monitor & assist as needed with Tx & DC planning.

## 2024-02-28 NOTE — PROGRESS NOTES
Physical Medicine and Rehabilitation Progress Note  Thomas Chase 83 y.o. male MRN: [id number]  Unit/Bed#: -02 Encounter: [id number]    Etiology/Reason for Admission: Impairment of mobility, safety, Activities of Daily Living (ADLs), and cognitive/communication skills due to Brain Dysfunction:  02.22  Traumatic, Closed Injury    Interval History: Seen face-to-face at bedside. No acute events overnight. His wife is present with him. No current concerns. Tylenol changed to PRN at patient and family request as currently he is without significant pain. Vital signs reviewed, stable and WNL. Last BM on 2/27/24, bowels regular. Urology contacted this morning, indwelling urinary catheter removed and restarted on timed void regimen. Interdisciplinary team meeting held today to discuss progress and barriers with respect to disposition. Separate note with pertinent items will be available.    Assessment/Plan - Continue plan of care as per primary attending, unless otherwise stated below:      Rehab - Continue PT, OT, and SLP  Bowel - Patient reports no constipation  Bladder - Urology consulted and now following for urinary retention. Indwelling urinary catheter placed on 2/22/24 after being unable to spontaneously void and removed after 6 days on 2/28/24. Now on void trial, timed voids and urinary retention protocol. U/A was negative for UTI. Unclear etiology for retention, possibly medication related, could be rare effect of Keppra versus hypomobility-induced.   Skin -  Encourage regular turning as patient at risk for skin breakdown  Staff to continue patient education on Q2h turning  Rehabilitation team to perform skin checks regularly  Pain - Continue current pain regimen  HTN - Stable, continue anti-hypertensives as per IM  ICH - Keppra for seizure prophylaxis, monitor neurologic examination and outpatient follow-up with neurosurgery   At risk for falls - off 1:1, maintain room proximity to RN station, 4 side  rails, behavior log at night, maintain good sleep/wake cycle, avoid overstimulation, using cochlear implant devices to improve hearing and interaction/connection with environment  Disposition - plan for discharge to home with home PT, OT, SLP, RN and SW on 3/8/24    Daniel Clayton MD  Attending Physician  Physical Medicine and Rehabilitation  [facility]    Review of Systems:  A 10 point ROS was performed; negative except as noted above.       Current Facility-Administered Medications:     acetaminophen (TYLENOL) tablet 650 mg, 650 mg, Oral, Q6H PRN, Daniel Clayton MD    enoxaparin (LOVENOX) subcutaneous injection 40 mg, 40 mg, Subcutaneous, Q24H JEANNIE, Alexandrea Lugo MD, 40 mg at 02/28/24 0849    loratadine (CLARITIN) tablet 10 mg, 10 mg, Oral, Daily, Amada L Dagnall, PA-C, 10 mg at 02/28/24 0849    meclizine (ANTIVERT) tablet 25 mg, 25 mg, Oral, Q8H PRN, Amada L Dagnall, PA-C    melatonin tablet 6 mg, 6 mg, Oral, HS, Amada L Dagnall, PA-C, 6 mg at 02/27/24 2104    multivitamin-minerals (CENTRUM) tablet 1 tablet, 1 tablet, Oral, Daily, Amada L Dagnall, PA-C, 1 tablet at 02/28/24 0849    nystatin (MYCOSTATIN) oral suspension 500,000 Units, 500,000 Units, Swish & Swallow, 4x Daily, Darwin Paz MD, 500,000 Units at 02/28/24 1300    polyethylene glycol (MIRALAX) packet 17 g, 17 g, Oral, Daily PRN, Ashley Depadua, MD, 17 g at 02/25/24 1219    pravastatin (PRAVACHOL) tablet 40 mg, 40 mg, Oral, Daily With Dinner, Amada L Dagnall, PA-C, 40 mg at 02/27/24 1730    sertraline (ZOLOFT) tablet 25 mg, 25 mg, Oral, Daily, Amada L Dagnall, PA-C, 25 mg at 02/28/24 0849    tamsulosin (FLOMAX) capsule 0.4 mg, 0.4 mg, Oral, Daily With Dinner, Daniel Clayton MD, 0.4 mg at 02/27/24 6939    Physical Exam:  Temp:  [97.8 °F (36.6 °C)-98.3 °F (36.8 °C)] 97.8 °F (36.6 °C)  HR:  [84-88] 88  Resp:  [16-17] 16  BP: ()/(59-86) 118/86  SpO2:  [97 %] 97 %    GEN: Patient seen resting comfortably  "in bedside chair, NAD  HEENT: NCAT; mucous membranes moist  CV: No extremity edema  PULM: Breathing comfortably on room air  ABD: Non-distended  SKIN: No obvious rashes or lesions on exposed skin  NEURO:  Awake and alert, very hard of hearing  Produces intermittent speech and often gestures to his ears indicating that he has trouble hearing  Moving all extremities spontaneously in chair    Laboratory:  No new labs        Invalid input(s): \"PLTCT\"          Invalid input(s): \"CA\"           Diagnostic Studies: Reviewed, no new imaging   No orders to display       ** Please Note: Fluency Direct voice to text software may have been used in the creation of this document. **    I have spent a total time of 55 minutes on 02/28/24 in caring for this patient including Instructions for management, Patient and family education, Importance of tx compliance, Impressions, Counseling / Coordination of care, Documenting in the medical record, Reviewing / ordering tests, medicine, procedures  , Obtaining or reviewing history  , and Communicating with other healthcare professionals .      "

## 2024-02-28 NOTE — PROGRESS NOTES
Progress Note - Thomas Chase 83 y.o. male MRN: [id number]    Unit/Bed#: [facility] 213-02 Encounter: [id number]        Subjective:   Patient seen and examined at bedside after reviewing the chart and discussing the case with the caring staff.      Patient examined at bedside.  Patient has no reported acute symptoms.  Finley removed this morning as per urology.     Patient's BP noted to be on low side this morning 91/64.  Asymptomatic.      Physical Exam   Vitals: Blood pressure 91/64, pulse 88, temperature 97.8 °F (36.6 °C), temperature source Temporal, resp. rate 16, height 6' (1.829 m), weight 56.6 kg (124 lb 12.5 oz), SpO2 97%.,Body mass index is 16.92 kg/m².  Constitutional: Patient in no acute distress.  HEENT: PERR, EOMI, MMM.  Cardiovascular: Normal rate and regular rhythm.    Pulmonary/Chest: Effort normal and breath sounds normal.   Abdomen: Soft, + BS, NT.    Assessment/Plan:  Thomas Chase is a(n) 83 y.o. year old male with left SDH and right SAH.     1.  Cardiac with hx of HTN, HLD.  On pravastatin 40 mg daily (simvastatin nonformulary).  Continue to monitor BP.  2.  Allergic rhinitis.  Patient is on loratadine 10 mg daily.  3.  Depression/anxiety.  Patient is on Zoloft 25 mg daily.  4.  Insomnia.  Patient is on melatonin 6 mg at bedtime.  5.  Thrush.  Nystatin liquid swish and swallow 4 times daily.  6.  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  Patient is mainly nonverbal.  7.  Urinary retention.  Urinary retention protocol.  Started on Flomax 0.4 mg daily 2/21/24.  Urology consulted.  Finley catheter placed 2/22/24.  Per urology, continue Flomax for 5-7 days before repeating voiding trial.  Finley removed on 2/28/24.   8.  Dysphagia.  Speech therapy following.  Dysphagia 1 puureed with nectar thick liquid.  9.  Pain and bowel management.  As per physiatrist.  10.  Intracranial hemorrhage.   Patient will be receiving intensive PT OT ST as per physiatrist.    Anticipated discharge date:   Friday 3/8/24  PCP:  RONY Diaz    The patient was discussed with Dr. Paz and he is in agreement with the above note.

## 2024-02-28 NOTE — NURSING NOTE
Patient resting quietly in bed with eyes closed. Skin warm and dry. Respirations easy on room air.  Finley cath patent and draining clear yellow urine.  Finley care provided.  No s/s distress noted. Call bell in reach at bedside.

## 2024-02-29 LAB
ALBUMIN SERPL BCP-MCNC: 3.5 G/DL (ref 3.5–5)
ALP SERPL-CCNC: 123 U/L (ref 34–104)
ALT SERPL W P-5'-P-CCNC: 40 U/L (ref 7–52)
ANION GAP SERPL CALCULATED.3IONS-SCNC: 9 MMOL/L
AST SERPL W P-5'-P-CCNC: 29 U/L (ref 13–39)
BASOPHILS # BLD AUTO: 0.04 THOUSANDS/ÂΜL (ref 0–0.1)
BASOPHILS NFR BLD AUTO: 1 % (ref 0–1)
BILIRUB SERPL-MCNC: 0.53 MG/DL (ref 0.2–1)
BUN SERPL-MCNC: 25 MG/DL (ref 5–25)
CALCIUM SERPL-MCNC: 8.9 MG/DL (ref 8.4–10.2)
CHLORIDE SERPL-SCNC: 103 MMOL/L (ref 96–108)
CO2 SERPL-SCNC: 29 MMOL/L (ref 21–32)
CREAT SERPL-MCNC: 0.65 MG/DL (ref 0.6–1.3)
EOSINOPHIL # BLD AUTO: 0.06 THOUSAND/ÂΜL (ref 0–0.61)
EOSINOPHIL NFR BLD AUTO: 1 % (ref 0–6)
ERYTHROCYTE [DISTWIDTH] IN BLOOD BY AUTOMATED COUNT: 13.8 % (ref 11.6–15.1)
GFR SERPL CREATININE-BSD FRML MDRD: 89 ML/MIN/1.73SQ M
GLUCOSE P FAST SERPL-MCNC: 85 MG/DL (ref 65–99)
GLUCOSE SERPL-MCNC: 85 MG/DL (ref 65–140)
HCT VFR BLD AUTO: 39.5 % (ref 36.5–49.3)
HGB BLD-MCNC: 12.3 G/DL (ref 12–17)
IMM GRANULOCYTES # BLD AUTO: 0.02 THOUSAND/UL (ref 0–0.2)
IMM GRANULOCYTES NFR BLD AUTO: 0 % (ref 0–2)
LYMPHOCYTES # BLD AUTO: 1.16 THOUSANDS/ÂΜL (ref 0.6–4.47)
LYMPHOCYTES NFR BLD AUTO: 17 % (ref 14–44)
MCH RBC QN AUTO: 30.7 PG (ref 26.8–34.3)
MCHC RBC AUTO-ENTMCNC: 31.1 G/DL (ref 31.4–37.4)
MCV RBC AUTO: 99 FL (ref 82–98)
MONOCYTES # BLD AUTO: 0.52 THOUSAND/ÂΜL (ref 0.17–1.22)
MONOCYTES NFR BLD AUTO: 8 % (ref 4–12)
NEUTROPHILS # BLD AUTO: 5.01 THOUSANDS/ÂΜL (ref 1.85–7.62)
NEUTS SEG NFR BLD AUTO: 73 % (ref 43–75)
NRBC BLD AUTO-RTO: 0 /100 WBCS
PLATELET # BLD AUTO: 520 THOUSANDS/UL (ref 149–390)
PMV BLD AUTO: 9.9 FL (ref 8.9–12.7)
POTASSIUM SERPL-SCNC: 4.1 MMOL/L (ref 3.5–5.3)
PROT SERPL-MCNC: 7 G/DL (ref 6.4–8.4)
RBC # BLD AUTO: 4.01 MILLION/UL (ref 3.88–5.62)
SODIUM SERPL-SCNC: 141 MMOL/L (ref 135–147)
WBC # BLD AUTO: 6.81 THOUSAND/UL (ref 4.31–10.16)

## 2024-02-29 PROCEDURE — 97530 THERAPEUTIC ACTIVITIES: CPT

## 2024-02-29 PROCEDURE — 97110 THERAPEUTIC EXERCISES: CPT

## 2024-02-29 PROCEDURE — 97537 COMMUNITY/WORK REINTEGRATION: CPT

## 2024-02-29 PROCEDURE — 99232 SBSQ HOSP IP/OBS MODERATE 35: CPT | Performed by: PHYSICAL MEDICINE & REHABILITATION

## 2024-02-29 PROCEDURE — 80053 COMPREHEN METABOLIC PANEL: CPT

## 2024-02-29 PROCEDURE — 85025 COMPLETE CBC W/AUTO DIFF WBC: CPT

## 2024-02-29 PROCEDURE — 97116 GAIT TRAINING THERAPY: CPT

## 2024-02-29 PROCEDURE — 97130 THER IVNTJ EA ADDL 15 MIN: CPT

## 2024-02-29 PROCEDURE — 92526 ORAL FUNCTION THERAPY: CPT

## 2024-02-29 PROCEDURE — 97129 THER IVNTJ 1ST 15 MIN: CPT

## 2024-02-29 RX ORDER — TAMSULOSIN HYDROCHLORIDE 0.4 MG/1
0.8 CAPSULE ORAL
Status: DISCONTINUED | OUTPATIENT
Start: 2024-02-29 | End: 2024-03-12

## 2024-02-29 RX ADMIN — NYSTATIN 500000 UNITS: 100000 SUSPENSION ORAL at 09:58

## 2024-02-29 RX ADMIN — ENOXAPARIN SODIUM 40 MG: 40 INJECTION SUBCUTANEOUS at 09:58

## 2024-02-29 RX ADMIN — NYSTATIN 500000 UNITS: 100000 SUSPENSION ORAL at 22:00

## 2024-02-29 RX ADMIN — NYSTATIN 500000 UNITS: 100000 SUSPENSION ORAL at 17:34

## 2024-02-29 RX ADMIN — LORATADINE 10 MG: 10 TABLET ORAL at 09:58

## 2024-02-29 RX ADMIN — PRAVASTATIN SODIUM 40 MG: 40 TABLET ORAL at 17:30

## 2024-02-29 RX ADMIN — Medication 6 MG: at 22:18

## 2024-02-29 RX ADMIN — Medication 1 TABLET: at 09:58

## 2024-02-29 RX ADMIN — TAMSULOSIN HYDROCHLORIDE 0.8 MG: 0.4 CAPSULE ORAL at 17:30

## 2024-02-29 RX ADMIN — SERTRALINE HYDROCHLORIDE 25 MG: 25 TABLET ORAL at 09:58

## 2024-02-29 RX ADMIN — NYSTATIN 500000 UNITS: 100000 SUSPENSION ORAL at 12:11

## 2024-02-29 NOTE — PROGRESS NOTES
PM&R PROGRESS NOTE:  Thomas Chase 83 y.o. male MRN: [id number]  Unit/Bed#: -02 Encounter: [id number]        **CHANGE IN REHAB DIAGNOSIS FROM PRE-ADMISSION SCREEN  Rehab Diagnosis: Impairment of mobility, safety, Activities of Daily Living (ADLs), and cognitive/communication skills due to Brain Dysfunction:  02.22  Traumatic, Closed Injury  Etiologic Diagnosis: L SDH, R SAH, L anterior hypodensity lateral temporal lobe    HPI: Thomas Chase is a 83 y.o. 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 on 2/13/2024 with increased weakness and mental status change for several days.  Unclear if patient had head trauma.  CT head showing a 3 mm left frontal subdural hemorrhage.  CT lumbar spine showing DJD.  Patient found to have dysphagia and was seen by speech therapy.  VFSS on 2/15/2024 cleared him for a puréed diet with thin liquids.  Patient was also started by psychiatry for depression and started on Zoloft 25 mg daily.  Patient sustained a rapid response and a unwitnessed fall with head trauma notable bump on head on 2/15/2024.  CT head showing a new small volume acute subarachnoid hemorrhage in the left frontal opercular region as well as new nondisplaced fracture of the zygomatic arch with soft tissue contusion, stable 3 mm subdural hemorrhage seen previously.  Patient transferred to [facility] for further evaluation.  Patient seen by neurology and neurosurgery.  CTA head and neck showing a left anterior hypodensity in the left anterior lateral temporal lobe with 2 punctate hyperdensities.  These represented possible nonhemorrhagic contusions or venous infarcts.  Less likely to represent ischemia or neoplasm.  Follow-up imaging recommended with CT head in 2-3 weeks.  CT  venogram showing patent dural venous sinuses.  Patient was cleared for DVT prophylaxis and started on Keppra for seizure prophylaxis.    Medical course complicated by suspected UTI based on UA and symptoms.  Patient was treated with antibiotics x 3 days.  After medical stabilization, patient found to have acute functional deficits from his baseline in mobility, self-care, speech swallow cognition therefore admitted to [facility] for acute inpatient rehabilitation.    SUBJECTIVE: Patient seen face to face.  No acute issues.  Progressing as expected in rehabilitation.      ASSESSMENT: Stable, progressing      PLAN:    Rehabilitation  Functional deficits: Aphasia, dysphagia, impaired cognition, impaired balance, impulsivity, poor safety awareness impaired mobility, self care    Continue current rehabilitation plan of care to maximize function.    Estimated Discharge: Friday, 3/8/2024 with home health care    DVT prophylaxis  Continue Lovenox      * Intracranial hemorrhage (HCC)  Assessment & Plan  Initial presentation on 2/13/2024 with increased weakness in the legs, mental status changes for several days  CT head showing a 3 mm left frontal subdural hemorrhage  Rapid response 2/15/2024 with fall and head trauma  CT head showing a new small volume acute subarachnoid hemorrhage in the left frontal opercular region and new nondisplaced fracture of the zygomatic arch with soft tissue contusion.    Transferred to [facility]  CTA head and neck and and repeat CT head showing a left anterior hypodensity in the left anterior lateral temporal lobe with 2 punctate hyperdensities.  Differential diagnosis nonhemorrhagic contusions or venous infarcts.  Patient seen by neurology and neurosurgery  Conservative management  Cleared for DVT prophylaxis  Started on Keppra for seizure prophylaxis  Repeat CT head in 2-3 weeks  Follow-up with neurosurgery and neurology in outpatient clinic.    Behavior safety risk  Assessment & Plan  Patient  demonstrating mild impulsivity in acute care  Fall x 2 in acute care  Falls at home in the past    Safety behavior plan going well and ARC.  No further impulsivity  Nursing and staff to provide close supervision near nursing station  Patient placed on 4 side rails (not as a restraint, patient and wife preference)  Monitor sleep-wake cycle  Avoid overstimulation: 1 visitor at a time, low lights, low sounds      Headache  Assessment & Plan  Continue Tylenol 975 mg every 8 hours scheduled    Severe protein-calorie malnutrition (HCC)  Assessment & Plan  BMI 17  Nutrition following  Optimize oral intake  Supplements ordered    Dysphagia  Assessment & Plan  VFSS completed 2/15/2024  Cleared for a puréed diet with nectar thick liquids  SLP to follow while at [facility]  Repeat VFSS this week    Urinary retention  Assessment & Plan  Finley catheter removed  Trial of void underway  Mixed pattern  Thus far required 2 straight catheterizations  Continue urinary retention protocol    Hearing loss  Assessment & Plan  Chronic hearing loss  Patient status post cochlear implant  MRI contraindicated    Hypercholesterolemia  Assessment & Plan  Continue simvastatin 20 mg daily (home dose)    UTI (urinary tract infection)-resolved as of 2/21/2024  Assessment & Plan  Suspected UTI based on UA and symptoms in acute care  Completed 3 days IV antibiotics        Appreciate IM consultants medical co-management.  Labs, medications, and imaging personally reviewed.      ROS:  A ten point review of systems was completed on 02/29/24 and pertinent positives are listed in subjective section. All other systems reviewed were negative.       OBJECTIVE:   /59 (BP Location: Left arm)   Pulse 78   Temp 97.7 °F (36.5 °C) (Temporal)   Resp 18   Ht 6' (1.829 m)   Wt 56.6 kg (124 lb 12.5 oz)   SpO2 90%   BMI 16.92 kg/m²     Physical Exam  Vitals and nursing note reviewed.   Constitutional:       General: He is not in acute distress.     Appearance: He  is well-developed.   HENT:      Head: Normocephalic.      Nose: Nose normal.   Eyes:      Conjunctiva/sclera: Conjunctivae normal.   Pulmonary:      Effort: Pulmonary effort is normal.      Breath sounds: No wheezing.   Abdominal:      General: There is no distension.      Palpations: Abdomen is soft.   Musculoskeletal:         General: No swelling.      Cervical back: Neck supple.   Skin:     General: Skin is warm.   Neurological:      Mental Status: He is alert and oriented to person, place, and time.   Psychiatric:         Mood and Affect: Mood normal.          Lab Results   Component Value Date    WBC 6.81 02/29/2024    HGB 12.3 02/29/2024    HCT 39.5 02/29/2024    MCV 99 (H) 02/29/2024     (H) 02/29/2024     Lab Results   Component Value Date    SODIUM 141 02/29/2024    K 4.1 02/29/2024     02/29/2024    CO2 29 02/29/2024    BUN 25 02/29/2024    CREATININE 0.65 02/29/2024    GLUC 85 02/29/2024    CALCIUM 8.9 02/29/2024     Lab Results   Component Value Date    INR 1.02 06/02/2022    INR 1.10 05/29/2022    INR 1.04 05/28/2022    PROTIME 13.0 06/02/2022    PROTIME 13.7 05/29/2022    PROTIME 13.2 05/28/2022           Current Facility-Administered Medications:     acetaminophen (TYLENOL) tablet 650 mg, 650 mg, Oral, Q6H PRN, Daniel Clayton MD    enoxaparin (LOVENOX) subcutaneous injection 40 mg, 40 mg, Subcutaneous, Q24H JEANNIE, Alexandrea Lugo MD, 40 mg at 02/29/24 0958    loratadine (CLARITIN) tablet 10 mg, 10 mg, Oral, Daily, Amada L Dagnall, PA-C, 10 mg at 02/29/24 0958    meclizine (ANTIVERT) tablet 25 mg, 25 mg, Oral, Q8H PRN, Amada L Dagnall, PA-C    melatonin tablet 6 mg, 6 mg, Oral, HS, Amada L Dagnall, PA-C, 6 mg at 02/28/24 2129    multivitamin-minerals (CENTRUM) tablet 1 tablet, 1 tablet, Oral, Daily, Amada Epperson PA-C, 1 tablet at 02/29/24 0958    nystatin (MYCOSTATIN) oral suspension 500,000 Units, 500,000 Units, Swish & Swallow, 4x Daily, Darwin Paz MD, 500,000 Units at  02/29/24 1211    polyethylene glycol (MIRALAX) packet 17 g, 17 g, Oral, Daily PRN, Ashley Depadua, MD, 17 g at 02/25/24 1219    pravastatin (PRAVACHOL) tablet 40 mg, 40 mg, Oral, Daily With Dinner, CHRISTIAN Talbot-C, 40 mg at 02/28/24 1730    sertraline (ZOLOFT) tablet 25 mg, 25 mg, Oral, Daily, Amada Epperson PA-C, 25 mg at 02/29/24 0958    tamsulosin (FLOMAX) capsule 0.4 mg, 0.4 mg, Oral, Daily With Dinner, Daniel Clayton MD, 0.4 mg at 02/28/24 1730    Past Medical History:   Diagnosis Date    Arthritis     Encounter for general adult medical examination without abnormal findings 03/20/2019    Fall 11/03/2022    Hyperlipidemia     Macular degeneration, wet ([facility])     Urinary retention 06/02/2022       Patient Active Problem List    Diagnosis Date Noted    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    Behavior safety risk 02/20/2024    Headache 02/20/2024    Bilateral lower extremity weakness 02/17/2024    Abnormal CT scan, lumbar spine 02/15/2024    Severe protein-calorie malnutrition ([facility]) 02/14/2024    Debility 02/13/2024    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    Dysphagia 11/21/2023    Macular degeneration, age related 02/27/2023    Traumatic subdural hemorrhage with loss of consciousness of unspecified duration, initial encounter ([facility]) 02/27/2023    Sensorineural hearing loss (SNHL), bilateral 08/18/2022    Balance disorder 06/28/2022    Abnormal echocardiogram 06/27/2022    Right bundle-branch block 06/27/2022    Moderate protein-calorie malnutrition (HCC) 06/19/2022    Hypotension 06/19/2022    Diastolic dysfunction 06/19/2022    EKG abnormalities 06/19/2022    Constipation 06/02/2022    Urinary retention 06/02/2022    SDH (subdural hematoma) ([facility]) 05/27/2022    Primary osteoarthritis of left knee 05/17/2022    Hygroma 04/26/2022    Right hand pain     Carpal tunnel syndrome on right     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      ** Please Note:  voice to text software may have been used in the creation of this document. Although proof errors in transcription or interpretation are a potential of such software**

## 2024-02-29 NOTE — PROGRESS NOTES
Progress Note - Thomas Chase 83 y.o. male MRN: [id number]    Unit/Bed#: [facility] 213-02 Encounter: [id number]      Assessment:  Brooks removed yesterday for voiding trial. Voided well yesterday but less so today with straight cath for 600cc.     Plan:  Recheck PVR and may need to replace brooks if significantly elevated. Increase tamsulosin to 0.8 mg daily. Would then repeat voiding trial when mobility improves and in at least 5-7 days.    Subjective:   Resting comfortably. Plan discussed with his wife at the bedside    Objective:     Vitals: Blood pressure 117/59, pulse 78, temperature 97.7 °F (36.5 °C), temperature source Temporal, resp. rate 18, height 6' (1.829 m), weight 56.6 kg (124 lb 12.5 oz), SpO2 90%.,Body mass index is 16.92 kg/m².      Intake/Output Summary (Last 24 hours) at 2/29/2024 1522  Last data filed at 2/29/2024 1254  Gross per 24 hour   Intake 480 ml   Output 1125 ml   Net -645 ml       Physical Exam: no flank or abdominal tenderness     Invasive Devices       None                   Lab, Imaging and other studies: I have personally reviewed pertinent reports.    VTE Pharmacologic Prophylaxis: Sequential compression device (Venodyne)   VTE Mechanical Prophylaxis: sequential compression device

## 2024-02-29 NOTE — PROGRESS NOTES
02/29/24 1030   Pain Assessment   Pain Assessment Tool 0-10   Pain Score No Pain   Restrictions/Precautions   Precautions Aspiration;Bed/chair alarms;Cognitive;Fall Risk;Hard of hearing;Supervision on toilet/commode   Cognition   Overall Cognitive Status Impaired   Arousal/Participation Alert;Responsive;Cooperative   Attention Attends with cues to redirect   Orientation Level Oriented to person;Oriented to place;Oriented to time   Memory Decreased short term memory;Decreased recall of precautions;Decreased recall of recent events   Following Commands Follows one step commands with increased time or repetition   Roll Left and Right   Type of Assistance Needed Supervision   Comment bed rails   Roll Left and Right CARE Score 4   Lying to Sitting on Side of Bed   Type of Assistance Needed Incidental touching   Comment cg using bed rails   Lying to Sitting on Side of Bed CARE Score 4   Sit to Stand   Type of Assistance Needed Incidental touching   Comment cg using RW; increased visual and tactile cues for sequence and direction   Sit to Stand CARE Score 4   Bed-Chair Transfer   Type of Assistance Needed Incidental touching   Comment cg using RW; increased visual and tactile cues for sequence and direction   Chair/Bed-to-Chair Transfer CARE Score 4   Walk 10 Feet   Type of Assistance Needed Incidental touching   Comment cg level and unlevel surfaces with RW; visual/tactile cues for sequence/direction   Walk 10 Feet CARE Score 4   Walk 50 Feet with Two Turns   Type of Assistance Needed Incidental touching   Comment cg level and unlevel surfaces with RW; visual/tactile cues for sequence/direction   Walk 50 Feet with Two Turns CARE Score 4   Walk 150 Feet   Type of Assistance Needed Incidental touching   Comment cg level and unlevel surfaces with RW; visual/tactile cues for sequence/direction   Walk 150 Feet CARE Score 4   Walking 10 Feet on Uneven Surfaces   Type of Assistance Needed Incidental touching   Comment cg  level and unlevel surfaces with RW; visual/tactile cues for sequence/direction   Walking 10 Feet on Uneven Surfaces CARE Score 4   Ambulation   Primary Mode of Locomotion Prior to Admission Walk   Distance Walked (feet) 186 ft  (158' 133' 186')   Assist Device Roller Walker   Gait Pattern Inconsistant Maria Victoria;Slow Maria Victoria;Decreased foot clearance;Narrow ILIANA;Improper weight shift   Limitations Noted In Balance;Coordination;Device Management;Endurance;Heel Strike;Posture;Safety;Sequencing;Strength   Provided Assistance with: Balance;Direction   Walk Assist Level Contact Guard   Findings cg level and unlevel surfaces with RW; visual/tactile cues for sequence/direction   Does the patient walk? 2. Yes   Curb or Single Stair   Style negotiated Single stair   Type of Assistance Needed Incidental touching   Comment cg 7 steps 2 handrails; increased visual/tactile cues for sequence/direction   1 Step (Curb) CARE Score 4   4 Steps   Type of Assistance Needed Incidental touching   Comment cg 7 steps 2 handrails; increased visual/tactile cues for sequence/direction   4 Steps CARE Score 4   Stairs   Type Stairs;Ramp   # of Steps 7   Weight Bearing Precautions WBAT;Fall Risk   Assist Devices Bilateral Rail;Roller Walker   Findings cg 7 steps 2 handrails; increased visual/tactile cues for sequence/direction; cg/occ MIN A ramp with RW   Picking Up Object   Reason if not Attempted Safety concerns   Picking Up Object CARE Score 88   Therapeutic Interventions   Strengthening supine and seated ther ex   Balance gait and transfer training   Other ramp and stair negotiation   Assessment   Treatment Assessment Patient agreeable to therapy session.  No pain.    Decreased communication remains with inconsistent use of  white board.    Increasd visual/tactile cues required to complete all tasks.    Completed ther ex for general LE strengthening; gait and transfer training focusing on sequence and technique for improved balance and safety  with functional mobility using walker.  Negotiated ramp cg/occ min A and steps cg with increased cues for sequence and direction.   PT Barriers   Physical Impairment Decreased strength;Decreased range of motion;Decreased endurance;Impaired balance;Decreased mobility;Decreased coordination;Decreased cognition;Impaired judgement;Decreased safety awareness;Impaired tone   Functional Limitation Wheelchair management;Walking;Transfers;Standing;Stair negotiation;Ramp negotiation;Car transfers   Plan   Treatment/Interventions Functional transfer training;LE strengthening/ROM;Elevations;Therapeutic exercise;Bed mobility;Gait training   Progress Progressing toward goals   PT Therapy Minutes   PT Time In 1030   PT Time Out 1200   PT Total Time (minutes) 90   PT Mode of treatment - Individual (minutes) 90   PT Mode of treatment - Concurrent (minutes) 0   PT Mode of treatment - Group (minutes) 0   PT Mode of treatment - Co-treat (minutes) 0   PT Mode of Treatment - Total time(minutes) 90 minutes   PT Cumulative Minutes 734

## 2024-02-29 NOTE — PLAN OF CARE
Problem: PAIN - ADULT  Goal: Verbalizes/displays adequate comfort level or baseline comfort level  Description: Interventions:  - Encourage patient to monitor pain and request assistance  - Assess pain using appropriate pain scale  - Administer analgesics based on type and severity of pain and evaluate response  - Implement non-pharmacological measures as appropriate and evaluate response  - Consider cultural and social influences on pain and pain management  - Notify physician/advanced practitioner if interventions unsuccessful or patient reports new pain  Outcome: Progressing     Problem: DISCHARGE PLANNING  Goal: Discharge to home or other facility with appropriate resources  Description: INTERVENTIONS:  - Identify barriers to discharge w/patient and caregiver  - Arrange for needed discharge resources and transportation as appropriate  - Identify discharge learning needs (meds, wound care, etc.)  - Arrange for interpretive services to assist at discharge as needed  - Refer to Case Management Department for coordinating discharge planning if the patient needs post-hospital services based on physician/advanced practitioner order or complex needs related to functional status, cognitive ability, or social support system  Outcome: Progressing     Problem: Nutrition/Hydration-ADULT  Goal: Nutrient/Hydration intake appropriate for improving, restoring or maintaining nutritional needs  Description: Monitor and assess patient's nutrition/hydration status for malnutrition. Collaborate with interdisciplinary team and initiate plan and interventions as ordered.  Monitor patient's weight and dietary intake as ordered or per policy. Utilize nutrition screening tool and intervene as necessary. Determine patient's food preferences and provide high-protein, high-caloric foods as appropriate.     INTERVENTIONS:  - Monitor oral intake, urinary output, labs, and treatment plans  - Assess nutrition and hydration status and  Subjective   Patient ID: Brittany Atkinson is a 50 y.o. female who presents for Annual Exam.  HPI    The patient is a  51 yo AA female with a history of HPV infection, strong family history of breast and colon cancer (mother had both), here for establishment of care and for a CPE.  Patient is currently on Phentermine for weight management.  Prescribed by a different provider and started 3 months ago.     -Colonoscopy 10/29/2029- repeat in 5y.  -pap smear 1/30/2023- wnl.  No HPV.  -blood test 1/30/2023.   -mammogram 2021.   -continue Vit D.   -Immunizations all declined.    A review of system was completed.  All systems were reviewed and were normal, except for the ones that are listed in the HPI.    Objective   Physical Exam  Constitutional:       Appearance: Normal appearance.   HENT:      Head: Normocephalic and atraumatic.      Right Ear: Tympanic membrane, ear canal and external ear normal.      Left Ear: Tympanic membrane, ear canal and external ear normal.      Nose: Nose normal.      Mouth/Throat:      Mouth: Mucous membranes are moist.      Pharynx: Oropharynx is clear.   Eyes:      Extraocular Movements: Extraocular movements intact.      Conjunctiva/sclera: Conjunctivae normal.      Pupils: Pupils are equal, round, and reactive to light.   Cardiovascular:      Rate and Rhythm: Normal rate and regular rhythm.      Pulses: Normal pulses.   Pulmonary:      Effort: Pulmonary effort is normal.      Breath sounds: Normal breath sounds.   Abdominal:      General: Abdomen is flat. Bowel sounds are normal.      Palpations: Abdomen is soft.   Musculoskeletal:         General: Normal range of motion.      Cervical back: Normal range of motion and neck supple.   Skin:     General: Skin is warm.   Neurological:      General: No focal deficit present.      Mental Status: She is alert and oriented to person, place, and time. Mental status is at baseline.   Psychiatric:         Mood and Affect: Mood normal.          Behavior: Behavior normal.         Thought Content: Thought content normal.         Judgment: Judgment normal.     Assessment/Plan   Problem List Items Addressed This Visit       Health care maintenance - Primary     -Colonoscopy 10/29/2029- repeat in 5y.  -pap smear 1/30/2023- wnl.  No HPV.  -blood test 1/30/2023.   -mammogram 2021.   -continue Vit D.   -Immunizations all declined.  -genetic referral for amily history of breast and colon cancer (mother had both).           Relevant Orders    CBC    Comprehensive Metabolic Panel    Hemoglobin A1C    Lipid Panel    TSH with reflex to Free T4 if abnormal    Colonoscopy Screening; Average Risk Patient    BI mammo bilateral screening tomosynthesis    HIV 1/2 Antigen/Antibody Screen with Reflex to Confirmation    Hepatitis C Antibody    Diabetes mellitus screening    Relevant Orders    Hemoglobin A1C    Screening cholesterol level    Relevant Orders    Lipid Panel    Visit for screening mammogram    Relevant Orders    BI mammo bilateral screening tomosynthesis    Colon cancer screening    Relevant Orders    Colonoscopy Screening; Average Risk Patient    Encounter for hepatitis C screening test for low risk patient    Relevant Orders    Hepatitis C Antibody    Encounter for screening for HIV    Immunization due    Relevant Orders    HIV 1/2 Antigen/Antibody Screen with Reflex to Confirmation    Family history of colon cancer in mother    Relevant Orders    Referral to Genetics    Family history of breast cancer in mother    Relevant Orders    Referral to Genetics      Patient to return to office in 1 year for your next CPE.   recommend course of action  - Evaluate amount of meals eaten  - Assist patient with eating if necessary   - Allow adequate time for meals  - Recommend/ encourage appropriate diets, oral nutritional supplements, and vitamin/mineral supplements  - Order, calculate, and assess calorie counts as needed  - Recommend, monitor, and adjust tube feedings and TPN/PPN based on assessed needs  - Assess need for intravenous fluids  - Provide specific nutrition/hydration education as appropriate  - Include patient/family/caregiver in decisions related to nutrition  Outcome: Progressing

## 2024-02-29 NOTE — PROGRESS NOTES
02/29/24 1230   Pain Assessment   Pain Assessment Tool 0-10   Pain Score No Pain   Restrictions/Precautions   Precautions Aspiration;Bed/chair alarms;Cognitive;Fall Risk;Hard of hearing   Weight Bearing Restrictions No   ROM Restrictions No   Sit to Stand   Type of Assistance Needed Incidental touching   Comment CG   Sit to Stand CARE Score 4   Toilet Transfer   Type of Assistance Needed Incidental touching   Comment CG with physical cues for safety   Toilet Transfer CARE Score 4   Toilet Transfer   Surface Assessed Raised Toilet   Transfer Technique Standard   Limitations Noted In Balance;Endurance;Problem Solving;Safety;LE Strength   Adaptive Equipment Grab Bar;Walker   Exercise Tools   Other Exercise Tool 1 placed wooden pegs in pegboard with R hand with intention of following color pattern, frequent and consistent physical and visual cues required to attend to activity, however pt required less cueing and was able to complete effectively and within adequate amount of time after activity progressed   Other Exercise Tool 2 card matching activity with focus on sustained attention, pt required increased time to complete and one rest break 2* reports of sore R hand; pt completed with 100% accuracy   Cognition   Overall Cognitive Status Impaired   Arousal/Participation Alert;Responsive;Cooperative   Attention Attends with cues to redirect   Orientation Level Oriented to person;Oriented to place;Oriented to time   Memory Decreased short term memory;Decreased recall of precautions;Decreased recall of recent events   Following Commands Follows one step commands with increased time or repetition   Assessment   Treatment Assessment Pt agreeable to OT session this PM. Received sitting in recliner finishing lunch with wife present. Pt completed fine motor/strength, activity tolerance, and cognition/attention activities; details in respective sections. Pt continues to show improvements in sustained attention and problem  solving during respective activities, however does require significant cueing initially to initiate activity and maintain attention. Pt reported sore R hand during card activity, which pt's wife reports is new onset since ICH. Pt able to verbalize more frequently this session, naming the colors of each wooden peg and responding to orientation questions written on whiteboard. Toilet transfer completed at end of session with CG and physical cues for safety. Pt's wife provided excellent encouragement and cueing during session as well.  Pt's barriers to d/c include decreased strength throughout, decreased balance, impaired hearing, impaired cognition including safety awareness, problem solving, attention, comprehension, and expression, and decreased activity tolerance; all affect independence in self care and fxl transfers. Pt would benefit from continued skilled OT services in order to address listed barriers and prepare for safe d/c   Prognosis Fair   Problem List Decreased strength;Decreased range of motion;Decreased endurance;Impaired balance;Decreased cognition;Decreased safety awareness;Impaired judgement;Impaired hearing;Decreased coordination   Plan   Treatment/Interventions ADL retraining;Functional transfer training;LE strengthening/ROM;Therapeutic exercise;Endurance training;Cognitive reorientation;Patient/family training;Equipment eval/education;Compensatory technique education;OT   Progress Progressing toward goals   OT Therapy Minutes   OT Time In 1230   OT Time Out 1330   OT Total Time (minutes) 60   OT Mode of treatment - Individual (minutes) 60

## 2024-02-29 NOTE — ASSESSMENT & PLAN NOTE
Finley catheter removed on ARC  Trial of void underway  Mixed pattern  Unfortunately required multiple straight catheterizations  Finley catheter replaced 3/1/24 per urology  Flomax reduced to 0.4 mg daily due to hypotension   Trial of void 3/7/2024 - failed this  Finley re-inserted 3/8/24  Patient likely to require longer time prior to removal of Finley catheter -recommend removal in 2 weeks around 3/22/2024  Finley removed and Trial of void started 3/22/2024  Slow in the beginning however after good BM patient was starting to void more with lower PVRs.  Having both continent and incontinent episodes -mixed pattern  Timed toilet frequently  Bladder scans now for lack of void over shift

## 2024-02-29 NOTE — NURSING NOTE
Neurosurgery called and spoke with Katia who scheduled virtual consult for Wed 3/6/24 at 1 pm with Jeanette Brock.  Same documented in sticky note and SBAR.  Pt also scheduled for CT head 3/4 at [place] for 1000.   Amada leon.

## 2024-02-29 NOTE — PROGRESS NOTES
Progress Note - Thomas Chase 83 y.o. male MRN: [id number]    Unit/Bed#: [facility] 213-02 Encounter: [id number]        Subjective:   Patient seen and examined at bedside after reviewing the chart and discussing the case with the caring staff.      Patient examined at bedside.  Patient had brooks catheter removed yesterday and required 2 straight caths since then.  He otherwise has no reported acute symptoms.     Physical Exam   Vitals: Blood pressure 117/59, pulse 78, temperature 97.7 °F (36.5 °C), temperature source Temporal, resp. rate 18, height 6' (1.829 m), weight 56.6 kg (124 lb 12.5 oz), SpO2 90%.,Body mass index is 16.92 kg/m².  Constitutional: Patient in no acute distress.  HEENT: PERR, EOMI, MMM.  Cardiovascular: Normal rate and regular rhythm.    Pulmonary/Chest: Effort normal and breath sounds normal.   Abdomen: Soft, + BS, NT.    Assessment/Plan:  Thomas Chase is a(n) 83 y.o. year old male with left SDH and right SAH.     1.  Cardiac with hx of HTN, HLD.  On pravastatin 40 mg daily (simvastatin nonformulary).  Continue to monitor BP.  2.  Allergic rhinitis.  Patient is on loratadine 10 mg daily.  3.  Depression/anxiety.  Patient is on Zoloft 25 mg daily.  4.  Insomnia.  Patient is on melatonin 6 mg at bedtime.  5.  Thrush.  Nystatin liquid swish and swallow 4 times daily.  6.  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  Patient is mainly nonverbal.  7.  Urinary retention.  Urinary retention protocol.  Started on Flomax 0.4 mg daily 2/21/24.  Urology consulted.  Brooks catheter placed 2/22/24.  Brooks removed on 2/28/24.   8.  Dysphagia.  Speech therapy following.  Dysphagia 1 puureed with nectar thick liquid.  9.  Pain and bowel management.  As per physiatrist.  10.  Intracranial hemorrhage.   Patient will be receiving intensive PT OT ST as per physiatrist.    Anticipated discharge date:  Friday 3/8/24  PCP:  RONY Diaz    The patient was discussed with Dr. Paz and he is in  agreement with the above note.

## 2024-03-01 PROCEDURE — 97530 THERAPEUTIC ACTIVITIES: CPT

## 2024-03-01 PROCEDURE — 97129 THER IVNTJ 1ST 15 MIN: CPT

## 2024-03-01 PROCEDURE — 97537 COMMUNITY/WORK REINTEGRATION: CPT

## 2024-03-01 PROCEDURE — 97535 SELF CARE MNGMENT TRAINING: CPT

## 2024-03-01 PROCEDURE — 97110 THERAPEUTIC EXERCISES: CPT

## 2024-03-01 PROCEDURE — 0T9B70Z DRAINAGE OF BLADDER WITH DRAINAGE DEVICE, VIA NATURAL OR ARTIFICIAL OPENING: ICD-10-PCS | Performed by: INTERNAL MEDICINE

## 2024-03-01 PROCEDURE — 97116 GAIT TRAINING THERAPY: CPT

## 2024-03-01 PROCEDURE — 99232 SBSQ HOSP IP/OBS MODERATE 35: CPT | Performed by: PHYSICAL MEDICINE & REHABILITATION

## 2024-03-01 RX ADMIN — NYSTATIN 500000 UNITS: 100000 SUSPENSION ORAL at 18:16

## 2024-03-01 RX ADMIN — TAMSULOSIN HYDROCHLORIDE 0.8 MG: 0.4 CAPSULE ORAL at 16:15

## 2024-03-01 RX ADMIN — Medication 6 MG: at 21:38

## 2024-03-01 RX ADMIN — ENOXAPARIN SODIUM 40 MG: 40 INJECTION SUBCUTANEOUS at 09:31

## 2024-03-01 RX ADMIN — NYSTATIN 500000 UNITS: 100000 SUSPENSION ORAL at 09:36

## 2024-03-01 RX ADMIN — PRAVASTATIN SODIUM 40 MG: 40 TABLET ORAL at 16:15

## 2024-03-01 RX ADMIN — NYSTATIN 500000 UNITS: 100000 SUSPENSION ORAL at 13:34

## 2024-03-01 RX ADMIN — SERTRALINE HYDROCHLORIDE 25 MG: 25 TABLET ORAL at 09:30

## 2024-03-01 RX ADMIN — LORATADINE 10 MG: 10 TABLET ORAL at 09:30

## 2024-03-01 RX ADMIN — NYSTATIN 500000 UNITS: 100000 SUSPENSION ORAL at 21:38

## 2024-03-01 RX ADMIN — Medication 1 TABLET: at 09:30

## 2024-03-01 NOTE — NURSING NOTE
Finley was inserted this a.m. as ordered, return of clear tiana urine.  Pt tolerated procedure well.  Tolerated therapy today.  Supervision with meals maintained.   Wife vs this afternoon.  Continue same plan of care.

## 2024-03-01 NOTE — PROGRESS NOTES
03/01/24 0700   Pain Assessment   Pain Assessment Tool 0-10   Pain Score No Pain   Restrictions/Precautions   Precautions Aspiration;Bed/chair alarms;Cognitive;Fall Risk;Hard of hearing   Weight Bearing Restrictions No   ROM Restrictions No   Eating   Type of Assistance Needed Physical assistance   Physical Assistance Level 25% or less   Comment occasional assist to scoop food onto spoon in order to initiate feeding self; pt able to self feed much >90% of the time, coughing noted <25% of the time with visual and physical cues to swallow a second time to clear throat; continues to initiate reaching for and grabbing provalve cup without cues   Eating CARE Score 3   Eating Assessment   Food To Mouth Yes   Noted Coughing   Positioning Upright;Out of Bed   Safety Needs Increase Time;Cues;Freq Swallow   Meal Assessed Breakfast   Intake Mode PO   Finishes Timely No   Opens Packages No   Oral Hygiene   Type of Assistance Needed Supervision   Physical Assistance Level No physical assistance   Comment visual cues for thoroughness   Oral Hygiene CARE Score 4   Grooming   Able To Comb/Brush Hair;Wash/Dry Face;Brush/Clean Teeth   Limitation Noted In Problem Solving;Safety;Sequencing   Shower/Bathe Self   Type of Assistance Needed Physical assistance   Physical Assistance Level 26%-50%   Comment assist for buttocks, bilat lower LE, jae thoroughness   Shower/Bathe Self CARE Score 3   Bathing   Assessed Bath Style Sponge Bath   Anticipated D/C Bath Style Shower;Sponge Bath   Able to Gather/Transport No   Able to Wash/Rinse/Dry (body part) Left Arm;Right Arm;L Upper Leg;R Upper Leg;Chest;Abdomen   Limitations Noted in Balance;Endurance;Problem Solving;ROM;Safety;Sequencing   Positioning Seated;Standing   Findings  pt encouraged to wash all parts of body with adaptive methods, however unable to initiate these methods when attempted; continues to respond well to visual cues for sequencing   Upper Body Dressing   Type of Assistance  Needed Physical assistance   Physical Assistance Level 51%-75%   Comment assist to doff/don flannel shirt over bilat UE, doff t-shirt over head, don t-shirt over RUE to elbow and over head   Upper Body Dressing CARE Score 2   Lower Body Dressing   Type of Assistance Needed Physical assistance   Physical Assistance Level 76% or more   Comment able to assist in pulling clothing down and up from over knees when seated; significant visual and physical cues to encourage pt to pull clothing over hips/buttocks, however pt unable to do so   Lower Body Dressing CARE Score 2   Putting On/Taking Off Footwear   Type of Assistance Needed Physical assistance   Physical Assistance Level 76% or more   Comment able to present bilat feet to OT to doff/don socks   Putting On/Taking Off Footwear CARE Score 2   Dressing/Undressing Clothing   Remove UB Clothes Pullover Shirt;Jacket   Don UB Clothes Pullover Shirt;Jacket   Remove LB Clothes Undergarment;Socks   Don LB Clothes Pants;Undergarment;Socks   Limitations Noted In Balance;Endurance;Problem Solving;Safety;Sequencing;ROM   Positioning Supported Sit   Lying to Sitting on Side of Bed   Type of Assistance Needed Physical assistance   Physical Assistance Level 25% or less   Lying to Sitting on Side of Bed CARE Score 3   Sit to Stand   Type of Assistance Needed Incidental touching   Comment CG   Sit to Stand CARE Score 4   Toileting Hygiene   Comment pt attempted to urinate, however unsuccessful   Toilet Transfer   Type of Assistance Needed Incidental touching   Comment CG with significant physical cues for safety   Toilet Transfer CARE Score 4   Toilet Transfer   Surface Assessed Raised Toilet   Transfer Technique Standard   Limitations Noted In Balance;Endurance;Problem Solving;Safety;Sequencing   Adaptive Equipment Grab Bar;Walker   Exercise Tools   Other Exercise Tool 1 cognitive-focused Parquetry design matching, OT set out appropriate pieces for pt to place on matching shape on  picture, completed with >75% accuracy with difficulty matching triangle shape, required cues from OT to match shape exactly; noted difficulty with fine motor control as pt would often run into other pieces on design with his hand/wrist while placing newest piece   Cognition   Overall Cognitive Status Impaired   Arousal/Participation Alert;Cooperative   Attention Attends with cues to redirect   Orientation Level Oriented to person;Oriented to place   Memory Decreased short term memory;Decreased recall of precautions;Decreased recall of recent events   Following Commands Follows one step commands with increased time or repetition   Assessment   Treatment Assessment Pt agreeable to OT session this AM. Received lying supine in bed. ADL session completed; current LOF and details listed in respective sections. Pt is overall maxA LB dressing and toileting, modA UB dressing, Brayan bathing, supervision oral care; fxl transfers overall Brayan/CG. Significant physical and visual cues required to attempt to initiate tasks during ADL activities with low success outside of bathing. Pt completed cognitive/fine motor activity after ADL with good success however noted slightly impaired fine motor control. Brayan required to scoop food to initiate eating initially, then pt able to self feed successfully. Coughing noted >25% of the time with cues to double-swallow. Pt's barriers to d/c include decreased strength throughout, decreased balance, impaired hearing, impaired cognition including safety awareness, problem solving, attention, comprehension, and expression, and decreased activity tolerance; all affect independence in self care and fxl transfers. Pt would benefit from continued skilled OT services in order to address listed barriers and prepare for safe d/c.   Prognosis Fair   Problem List Decreased strength;Decreased range of motion;Decreased endurance;Impaired balance;Decreased cognition;Decreased safety awareness;Impaired  judgement;Impaired hearing;Decreased coordination   Plan   Treatment/Interventions ADL retraining;Functional transfer training;LE strengthening/ROM;Therapeutic exercise;Endurance training;Patient/family training;Cognitive reorientation;Equipment eval/education;Compensatory technique education;OT   Progress Progressing toward goals   OT Therapy Minutes   OT Time In 0700   OT Time Out 0837   OT Total Time (minutes) 97   OT Mode of treatment - Individual (minutes) 97

## 2024-03-01 NOTE — PROGRESS NOTES
Progress Note - Thomas Chase 83 y.o. male MRN: [id number]    Unit/Bed#: [facility] 213-02 Encounter: [id number]        Subjective:   Patient seen and examined at bedside after reviewing the chart and discussing the case with the caring staff.      Patient examined at bedside.  Patient Finley's have been put back as per recommendations by Dr. Boyer as he was retaining urine in his bladder.    Physical Exam   Vitals: Blood pressure 90/59, pulse 56, temperature (!) 97.2 °F (36.2 °C), temperature source Temporal, resp. rate 16, height 6' (1.829 m), weight 55.4 kg (122 lb 2.2 oz), SpO2 93%.,Body mass index is 16.56 kg/m².  Constitutional: Patient in no acute distress.  HEENT: PERR, EOMI, MMM.  Cardiovascular: Normal rate and regular rhythm.    Pulmonary/Chest: Effort normal and breath sounds normal.   Abdomen: Soft, + BS, NT.    Assessment/Plan:  Thomas Chase is a(n) 83 y.o. year old male with left SDH and right SAH.     1.  Cardiac with hx of HTN, HLD.  On pravastatin 40 mg daily (simvastatin nonformulary).  Continue to monitor BP.  2.  Allergic rhinitis.  Patient is on loratadine 10 mg daily.  3.  Depression/anxiety.  Patient is on Zoloft 25 mg daily.  4.  Insomnia.  Patient is on melatonin 6 mg at bedtime.  5.  Thrush.  Nystatin liquid swish and swallow 4 times daily.  6.  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  Patient is mainly nonverbal.  7.  Urinary retention.  Urinary retention protocol.  Started on Flomax 0.4 mg daily 2/21/24.  Urology consulted.  Finley catheter placed 2/22/24.  Finley removed on 2/28/24.   8.  Dysphagia.  Speech therapy following.  Dysphagia 1 puureed with nectar thick liquid.  9.  Pain and bowel management.  As per physiatrist.  10.  Intracranial hemorrhage.   Patient will be receiving intensive PT OT ST as per physiatrist.    Anticipated discharge date:  Friday 3/8/24  PCP:  RONY Diaz

## 2024-03-01 NOTE — PROGRESS NOTES
03/01/24 1000   Pain Assessment   Pain Assessment Tool 0-10   Pain Score No Pain   Restrictions/Precautions   Precautions Aspiration;Fall Risk;Finley;Cognitive;Hard of hearing;Supervision on toilet/commode   Cognition   Overall Cognitive Status Impaired   Arousal/Participation Alert;Responsive;Cooperative   Attention Attends with cues to redirect   Orientation Level Oriented to person;Oriented to place   Memory Decreased short term memory;Decreased recall of recent events;Decreased recall of precautions   Following Commands Follows one step commands with increased time or repetition   Therapeutic Interventions   Strengthening supine ther ex   Assessment   Treatment Assessment Patient agreeable to therapy session.   Increased fatigue noted as patient had difficulty staying awake.   No pain reported.  Completed ther ex for general LE stregthening.  Will see patient later for continued strengthening and functional mobility using walker.   PT Barriers   Physical Impairment Decreased strength;Decreased range of motion;Decreased endurance;Impaired balance;Decreased mobility;Decreased coordination;Decreased cognition;Impaired judgement;Decreased safety awareness;Impaired tone   Functional Limitation Wheelchair management;Walking;Transfers;Standing;Stair negotiation;Ramp negotiation;Car transfers   Plan   Treatment/Interventions LE strengthening/ROM;Therapeutic exercise   PT Therapy Minutes   PT Time In 1000   PT Time Out 1030   PT Total Time (minutes) 30   PT Mode of treatment - Individual (minutes) 30   PT Mode of treatment - Concurrent (minutes) 0   PT Mode of treatment - Group (minutes) 0   PT Mode of treatment - Co-treat (minutes) 0   PT Mode of Treatment - Total time(minutes) 30 minutes   PT Cumulative Minutes 764   Therapy Time missed   Time missed? No

## 2024-03-01 NOTE — PROGRESS NOTES
Patient seen from [id number], however, this clinician documented in wrong column.   03/01/24 1300   Pain Assessment   Pain Assessment Tool 0-10   Pain Score No Pain   Restrictions/Precautions   Precautions Aspiration;Fall Risk;Finley;Cognitive;Hard of hearing;Supervision on toilet/commode   Cognition   Overall Cognitive Status Impaired   Arousal/Participation Alert;Responsive;Cooperative   Attention Attends with cues to redirect   Orientation Level Oriented to person;Oriented to place   Memory Decreased short term memory;Decreased recall of recent events;Decreased recall of precautions   Following Commands Follows one step commands with increased time or repetition   Sit to Lying   Type of Assistance Needed Incidental touching   Comment cg with increased visual/tactile cues   Sit to Lying CARE Score 4   Lying to Sitting on Side of Bed   Type of Assistance Needed Physical assistance   Physical Assistance Level 26%-50%   Comment cg/min A with increased visual/tactile cues   Lying to Sitting on Side of Bed CARE Score 3   Sit to Stand   Type of Assistance Needed Incidental touching   Comment cg with RW; increased visual/tactile cues for sequence/direction   Sit to Stand CARE Score 4   Bed-Chair Transfer   Type of Assistance Needed Incidental touching   Comment cg with RW; increased visual/tactile cues for sequence/direction   Chair/Bed-to-Chair Transfer CARE Score 4   Walk 10 Feet   Type of Assistance Needed Physical assistance   Physical Assistance Level 26%-50%   Comment cg/occ min A level and unlevel surfaces with RW; increased visual/tactile cues for sequence and direction   Walk 10 Feet CARE Score 3   Walk 50 Feet with Two Turns   Type of Assistance Needed Physical assistance   Physical Assistance Level 26%-50%   Comment cg/occ min A level and unlevel surfaces with RW; increased visual/tactile cues for sequence and direction   Walk 50 Feet with Two Turns CARE Score 3   Walk 150 Feet   Type of Assistance Needed  Physical assistance   Physical Assistance Level 26%-50%   Comment cg/occ min A level and unlevel surfaces with RW; increased visual/tactile cues for sequence and direction   Walk 150 Feet CARE Score 3   Walking 10 Feet on Uneven Surfaces   Type of Assistance Needed Physical assistance   Physical Assistance Level 26%-50%   Comment cg/occ min A level and unlevel surfaces with RW; increased visual/tactile cues for sequence and direction   Walking 10 Feet on Uneven Surfaces CARE Score 3   Ambulation   Primary Mode of Locomotion Prior to Admission Walk   Distance Walked (feet) 182 ft  (182' 137')   Assist Device Roller Walker   Gait Pattern Inconsistant Maria Victoria;Slow Maria Victoria;Decreased foot clearance;Narrow ILIANA;Improper weight shift;Forward Flexion   Limitations Noted In Balance;Coordination;Device Management;Endurance;Posture;Safety;Strength   Provided Assistance with: Balance;Direction   Walk Assist Level Contact Guard;Minimum Assist   Findings cg/occ min A level and unlevel surfaces with RW; increased visual/tactile cues for sequence and direction   Does the patient walk? 2. Yes   Curb or Single Stair   Style negotiated Single stair   Type of Assistance Needed Physical assistance   Physical Assistance Level 26%-50%   Comment cg/min A steps with 2 handrails; increased visual/tactile cues for sequence/direction   1 Step (Curb) CARE Score 3   4 Steps   Type of Assistance Needed Physical assistance   Physical Assistance Level 26%-50%   Comment cg/min A steps with 2 handrails; increased visual/tactile cues for sequence/direction   4 Steps CARE Score 3   Stairs   Type Stairs   # of Steps 7   Weight Bearing Precautions WBAT;Fall Risk   Assist Devices Bilateral Rail   Findings cg/min A steps with 2 handrails; increased visual/tactile cues for sequence/direction   Therapeutic Interventions   Strengthening seated ther ex   Balance gait and transfer training   Other stair negotiation   Assessment   Treatment Assessment Patient  agreeable to therapy session.   No pain reported.  Patient with Finley re-inserted.    Continues to require max visual/tactile cues for sequence/direction for all tasks.  Increased assistance required today.   Completed ther ex for general LE strengthening; gait and transfer training focusing on sequence and technique for improved balance and safety with functional mobility using walker.  Negotiated steps with 2 handrails and cues for sequence/direction.    Patient appropriate for concurrent therapy to increase motivation and encouragement among pts with similar deficits while completing therapy session.   PT Barriers   Physical Impairment Decreased strength;Decreased range of motion;Decreased endurance;Impaired balance;Decreased mobility;Decreased coordination;Decreased cognition;Impaired judgement;Decreased safety awareness;Impaired hearing   Functional Limitation Wheelchair management;Walking;Transfers;Standing;Stair negotiation;Ramp negotiation;Car transfers   Plan   Treatment/Interventions Functional transfer training;LE strengthening/ROM;Elevations;Therapeutic exercise;Bed mobility;Gait training   Progress Progressing toward goals   PT Therapy Minutes   PT Time In 1230   PT Time Out 1330   PT Total Time (minutes) 60   PT Mode of treatment - Individual (minutes) 30   PT Mode of treatment - Concurrent (minutes) 30   PT Mode of treatment - Group (minutes) 0   PT Mode of treatment - Co-treat (minutes) 0   PT Mode of Treatment - Total time(minutes) 60 minutes   PT Cumulative Minutes 824   Therapy Time missed   Time missed? No

## 2024-03-01 NOTE — PROGRESS NOTES
02/28/24 1130   Pain Assessment   Pain Assessment Tool 0-10   Pain Score No Pain   Pain Rating: FLACC (Rest) - Face 0   Pain Rating: FLACC (Rest) - Legs 0   Pain Rating: FLACC (Rest) - Activity 0   Pain Rating: FLACC (Rest) - Cry 0   Pain Rating: FLACC (Rest) - Consolability 0   Score: FLACC (Rest) 0   Swallow Assessment   Swallow Treatment Assessment Session completed with wife present. Wife provided adequate cueing of all compensatory swallow strateiges 100% of the meal tray. Pt was able to feed himself 100% of meal tray. Benefitted from visual cues for second swallow through meal tray with some minimal independent second swallows. He did independently utilize liquid washes of nectar through meal tray. Variable minimal cough x4 through meal ( unchanged with puree and nectar). Pt trialed water via blue 5CC provalave cup as well and cough slightly increased in frequency vs. nectar thick. Continue same recommendations without change.   SLP Therapy Minutes   SLP Time In 1130   SLP Time Out 1230   SLP Total Time (minutes) 60   SLP Mode of treatment - Individual (minutes) 60   SLP Mode of treatment - Concurrent (minutes) 0   SLP Mode of treatment - Group (minutes) 0   SLP Mode of treatment - Co-treat (minutes) 0   SLP Mode of Treatment - Total time(minutes) 60 minutes   SLP Cumulative Minutes 430

## 2024-03-01 NOTE — NUTRITION
03/01/24 1325   Biochemical Data,Medical Tests, and Procedures   Biochemical Data/Medical Tests/Procedures Lab values reviewed;Meds reviewed   Labs (Comment) 2/29 ALP:133, all other CMP WNL   Meds (Comment) lovenox, antivert, melatonin, centrum, pravachol, zoloft, flomax   Nutrition-Focused Physical Exam   Nutrition-Focused Physical Exam Findings RN skin assessment reviewed  (Skin tear right pretibial, skin tear right elbow, wound right shoulder per documentation)   Nutrition-Focused Physical Exam Findings No edema noted. LBM 2/28   Medical-Related Concerns PMH reviewed   Adequacy of Intake   Nutrition Modality PO   Feeding Route   PO With assistance   Current PO Intake   Current Diet Order Dysphagia 1, NTL. B: oatmeal, yogurt, applesauce, pudding. L/D: yougurt, applesauce, pudding.   Nutrition Supplements Mighty Shake;Magic Cup  (mightyshake TID, magic cup BID)   Current Meal Intake 50-75%;%   Intake Supplements 25-50%;50-75%;%   Estimated calorie intake compared to estimated need Nutrient needs not always met however showing improvement.   PES Statement   Problem Continue previous diagnosis   Recommendations/Interventions   Malnutrition/BMI Present Yes  (as previously noted)   Summary Dysphagia 1, NTL. B: oatmeal, yogurt, applesauce, pudding. L/D: yogurt, applesauce, pudding. Mightyshake TID, magic cup BID. Meal completions mostly %, supplement intakes vary. 3/1 122#, BMI=16.56; 4# weight loss since admission 2/20 126#. On admission was noted to have trace BLE edema, now noted with no edema; edema improved. Medications reviewed. Oriented to person/place. Expressive aphasia. Deaf. Dysphagia noted. Skin integrity reviewed. LBM 2/28. Finley catheter placed back in due to urinary retention. Patient has been improving with self feeding per discussion with staff. Wife also reports she feels patient is doing better with meals.   Interventions/Recommendations Continue current diet order;Other  (Specify)  (continue food supplements)   Education Assessment   Education Patient/caregiver not appropriate for education at this time   Patient Nutrition Goals   Goal Avoid weight loss;Tolerate PO diet;Meet PO needs   Goal Status Not met;Met;Extended   Timeframe to complete goal by next f/u   Nutrition Complexity Risk   Nutrition complexity level Moderate risk   Nutrition review: 03/07/24   Follow up date 03/07/24

## 2024-03-01 NOTE — PROGRESS NOTES
PM&R PROGRESS NOTE:  Thomas Chase 83 y.o. male MRN: [id number]  Unit/Bed#: -02 Encounter: [id number]        **CHANGE IN REHAB DIAGNOSIS FROM PRE-ADMISSION SCREEN  Rehab Diagnosis: Impairment of mobility, safety, Activities of Daily Living (ADLs), and cognitive/communication skills due to Brain Dysfunction:  02.22  Traumatic, Closed Injury  Etiologic Diagnosis: L SDH, R SAH, L anterior hypodensity lateral temporal lobe    HPI: Thomas Chase is a 83 y.o. 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  Unclear if patient had head trauma.  CT head showing a 3 mm left frontal subdural hemorrhage.  CT lumbar spine showing DJD.  Patient found to have dysphagia and was seen by speech therapy.  VFSS on 2/15/2024 cleared him for a puréed diet with thin liquids.  Patient was also started by psychiatry for depression and started on Zoloft 25 mg daily.  Patient sustained a rapid response and a unwitnessed fall with head trauma notable bump on head on 2/15/2024.  CT head showing a new small volume acute subarachnoid hemorrhage in the left frontal opercular region as well as new nondisplaced fracture of the zygomatic arch with soft tissue contusion, stable 3 mm subdural hemorrhage seen previously.  Patient transferred to [facility] for further evaluation.  Patient seen by neurology and neurosurgery.  CTA head and neck showing a left anterior hypodensity in the left anterior lateral temporal lobe with 2 punctate hyperdensities.  These represented possible nonhemorrhagic contusions or venous infarcts.  Less likely to represent ischemia or neoplasm.  Follow-up imaging recommended with CT head in 2-3 weeks.  CT  venogram showing patent dural venous sinuses.  Patient was cleared for DVT prophylaxis and started on Keppra for seizure prophylaxis.    Medical course complicated by suspected UTI based on UA and symptoms.  Patient was treated with antibiotics x 3 days.  After medical stabilization, patient found to have acute functional deficits from his baseline in mobility, self-care, speech swallow cognition therefore admitted to [facility] for acute inpatient rehabilitation.    SUBJECTIVE: Patient seen face-to-face in therapies today.  Finley catheter had to be placed early this morning per urology due to persistent urinary retention.  This is definitely affecting his walking in therapy.  Hopeful that he will void sometime next week again.  No other acute issues reported by patient or nursing.  Appetite has improved as well as oral intake.    ASSESSMENT: Stable, progressing      PLAN:    Rehabilitation  Functional deficits: Aphasia, dysphagia, impaired cognition, impaired balance, impulsivity, poor safety awareness impaired mobility, self care    Continue current rehabilitation plan of care to maximize function.    Estimated Discharge: Friday, 3/8/2024 with home health care    DVT prophylaxis  Continue Lovenox      * Intracranial hemorrhage (HCC)  Assessment & Plan  Initial presentation on 2/13/2024 with increased weakness in the legs, mental status changes for several days  CT head showing a 3 mm left frontal subdural hemorrhage  Rapid response 2/15/2024 with fall and head trauma  CT head showing a new small volume acute subarachnoid hemorrhage in the left frontal opercular region and new nondisplaced fracture of the zygomatic arch with soft tissue contusion.    Transferred to [facility]  CTA head and neck and and repeat CT head showing a left anterior hypodensity in the left anterior lateral temporal lobe with 2 punctate hyperdensities.  Differential diagnosis nonhemorrhagic contusions or venous infarcts.  Patient seen by  neurology and neurosurgery  Conservative management  Cleared for DVT prophylaxis  Started on Keppra for seizure prophylaxis  Repeat CT head scheduled on Monday, 3/4/2024.  Follow-up with neurosurgery on 3/6/2024 virtually  Follow-up with neurosurgery and neurology in outpatient clinic.    Behavior safety risk  Assessment & Plan  Patient demonstrating mild impulsivity in acute care  Fall x 2 in acute care  Falls at home in the past    Safety behavior plan going well and ARC.  No further impulsivity  Nursing and staff to provide close supervision near nursing station  Patient placed on 4 side rails (not as a restraint, patient and wife preference)  Monitor sleep-wake cycle  Avoid overstimulation: 1 visitor at a time, low lights, low sounds      Headache  Assessment & Plan  Continue Tylenol 975 mg every 8 hours scheduled    Severe protein-calorie malnutrition (HCC)  Assessment & Plan  BMI 17  Nutrition following  Optimize oral intake  Supplements ordered    Dysphagia  Assessment & Plan  VFSS completed 2/15/2024  Cleared for a puréed diet with nectar thick liquids  SLP to follow while at [facility]  Repeat VFSS this week    Urinary retention  Assessment & Plan  Finley catheter removed on [facility]  Trial of void underway  Mixed pattern  Unfortunately required multiple straight catheterizations  Finley catheter replaced 3/1/24 per urology  Flomax increased to 0.8 mg every afternoon per urology  Trial of void in 5-7 days again  Follow-up with urology as an outpatient      Hearing loss  Assessment & Plan  Chronic hearing loss  Patient status post cochlear implant  MRI contraindicated    Hypercholesterolemia  Assessment & Plan  Continue simvastatin 20 mg daily (home dose)    UTI (urinary tract infection)-resolved as of 2/21/2024  Assessment & Plan  Suspected UTI based on UA and symptoms in acute care  Completed 3 days IV antibiotics        Appreciate IM consultants medical co-management.  Labs, medications, and imaging personally  reviewed.      ROS:  A ten point review of systems was completed on 03/01/24 and pertinent positives are listed in subjective section. All other systems reviewed were negative.       OBJECTIVE:   BP 90/59 (BP Location: Right arm)   Pulse 56   Temp (!) 97.2 °F (36.2 °C) (Temporal)   Resp 16   Ht 6' (1.829 m)   Wt 55.4 kg (122 lb 2.2 oz)   SpO2 93%   BMI 16.56 kg/m²     Physical Exam  Vitals and nursing note reviewed.   Constitutional:       General: He is not in acute distress.     Comments: Thin appearing male   HENT:      Head: Normocephalic and atraumatic.      Nose: Nose normal.      Mouth/Throat:      Mouth: Mucous membranes are moist.   Eyes:      Conjunctiva/sclera: Conjunctivae normal.   Cardiovascular:      Rate and Rhythm: Normal rate and regular rhythm.      Pulses: Normal pulses.   Pulmonary:      Effort: Pulmonary effort is normal.      Breath sounds: Normal breath sounds. No wheezing or rales.   Abdominal:      General: Bowel sounds are normal. There is no distension.      Palpations: Abdomen is soft.      Tenderness: There is no abdominal tenderness.   Genitourinary:     Comments: + Finley catheter in place  Musculoskeletal:         General: No swelling.      Cervical back: Neck supple.   Skin:     General: Skin is warm.   Neurological:      Mental Status: He is alert and oriented to person, place, and time.      Comments: Seen ambulating needing min assist   Psychiatric:         Mood and Affect: Mood normal.          Lab Results   Component Value Date    WBC 6.81 02/29/2024    HGB 12.3 02/29/2024    HCT 39.5 02/29/2024    MCV 99 (H) 02/29/2024     (H) 02/29/2024     Lab Results   Component Value Date    SODIUM 141 02/29/2024    K 4.1 02/29/2024     02/29/2024    CO2 29 02/29/2024    BUN 25 02/29/2024    CREATININE 0.65 02/29/2024    GLUC 85 02/29/2024    CALCIUM 8.9 02/29/2024     Lab Results   Component Value Date    INR 1.02 06/02/2022    INR 1.10 05/29/2022    INR 1.04 05/28/2022     PROTIME 13.0 06/02/2022    PROTIME 13.7 05/29/2022    PROTIME 13.2 05/28/2022           Current Facility-Administered Medications:     acetaminophen (TYLENOL) tablet 650 mg, 650 mg, Oral, Q6H PRN, Daniel Clayton MD    enoxaparin (LOVENOX) subcutaneous injection 40 mg, 40 mg, Subcutaneous, Q24H JEANNIE, Alexandrea Lugo MD, 40 mg at 03/01/24 0931    loratadine (CLARITIN) tablet 10 mg, 10 mg, Oral, Daily, Amada L Dagnall, PA-C, 10 mg at 03/01/24 0930    meclizine (ANTIVERT) tablet 25 mg, 25 mg, Oral, Q8H PRN, Amada L Dagnall, PA-C    melatonin tablet 6 mg, 6 mg, Oral, HS, Amada L Dagnall, PA-C, 6 mg at 02/29/24 2218    multivitamin-minerals (CENTRUM) tablet 1 tablet, 1 tablet, Oral, Daily, Amada L Dagnall, PA-C, 1 tablet at 03/01/24 0930    nystatin (MYCOSTATIN) oral suspension 500,000 Units, 500,000 Units, Swish & Swallow, 4x Daily, Darwin Paz MD, 500,000 Units at 03/01/24 1334    polyethylene glycol (MIRALAX) packet 17 g, 17 g, Oral, Daily PRN, Ashley Depadua, MD, 17 g at 02/25/24 1219    pravastatin (PRAVACHOL) tablet 40 mg, 40 mg, Oral, Daily With Dinner, Amada L Dagnall, PA-C, 40 mg at 03/01/24 1615    sertraline (ZOLOFT) tablet 25 mg, 25 mg, Oral, Daily, Amada L Dagnall, PA-C, 25 mg at 03/01/24 0930    tamsulosin (FLOMAX) capsule 0.8 mg, 0.8 mg, Oral, Daily With Dinner, Norberto Boyer MD, 0.8 mg at 03/01/24 1615    Past Medical History:   Diagnosis Date    Arthritis     Encounter for general adult medical examination without abnormal findings 03/20/2019    Fall 11/03/2022    Hyperlipidemia     Macular degeneration, wet (HCC)     Urinary retention 06/02/2022       Patient Active Problem List    Diagnosis Date Noted    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    Behavior safety risk 02/20/2024    Headache 02/20/2024    Bilateral lower extremity weakness 02/17/2024    Abnormal CT scan, lumbar spine 02/15/2024    Severe protein-calorie malnutrition (HCC) 02/14/2024    Debility 02/13/2024     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    Dysphagia 11/21/2023    Macular degeneration, age related 02/27/2023    Traumatic subdural hemorrhage with loss of consciousness of unspecified duration, initial encounter ([facility]) 02/27/2023    Sensorineural hearing loss (SNHL), bilateral 08/18/2022    Balance disorder 06/28/2022    Abnormal echocardiogram 06/27/2022    Right bundle-branch block 06/27/2022    Moderate protein-calorie malnutrition (HCC) 06/19/2022    Hypotension 06/19/2022    Diastolic dysfunction 06/19/2022    EKG abnormalities 06/19/2022    Constipation 06/02/2022    Urinary retention 06/02/2022    SDH (subdural hematoma) ([facility]) 05/27/2022    Primary osteoarthritis of left knee 05/17/2022    Hygroma 04/26/2022    Right hand pain     Carpal tunnel syndrome on right     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      ** Please Note:  voice to text software may have been used in the creation of this document. Although proof errors in transcription or interpretation are a potential of such software**

## 2024-03-02 PROCEDURE — 97116 GAIT TRAINING THERAPY: CPT

## 2024-03-02 PROCEDURE — 97110 THERAPEUTIC EXERCISES: CPT

## 2024-03-02 PROCEDURE — 97530 THERAPEUTIC ACTIVITIES: CPT

## 2024-03-02 PROCEDURE — 97112 NEUROMUSCULAR REEDUCATION: CPT

## 2024-03-02 RX ADMIN — ACETAMINOPHEN 650 MG: 325 TABLET ORAL at 21:10

## 2024-03-02 RX ADMIN — NYSTATIN 500000 UNITS: 100000 SUSPENSION ORAL at 13:40

## 2024-03-02 RX ADMIN — TAMSULOSIN HYDROCHLORIDE 0.8 MG: 0.4 CAPSULE ORAL at 17:33

## 2024-03-02 RX ADMIN — SERTRALINE HYDROCHLORIDE 25 MG: 25 TABLET ORAL at 08:41

## 2024-03-02 RX ADMIN — LORATADINE 10 MG: 10 TABLET ORAL at 08:41

## 2024-03-02 RX ADMIN — Medication 1 TABLET: at 08:40

## 2024-03-02 RX ADMIN — PRAVASTATIN SODIUM 40 MG: 40 TABLET ORAL at 17:32

## 2024-03-02 RX ADMIN — NYSTATIN 500000 UNITS: 100000 SUSPENSION ORAL at 08:54

## 2024-03-02 RX ADMIN — Medication 6 MG: at 21:11

## 2024-03-02 RX ADMIN — NYSTATIN 500000 UNITS: 100000 SUSPENSION ORAL at 21:12

## 2024-03-02 RX ADMIN — NYSTATIN 500000 UNITS: 100000 SUSPENSION ORAL at 17:33

## 2024-03-02 RX ADMIN — ENOXAPARIN SODIUM 40 MG: 40 INJECTION SUBCUTANEOUS at 08:41

## 2024-03-02 NOTE — PLAN OF CARE
Problem: PAIN - ADULT  Goal: Verbalizes/displays adequate comfort level or baseline comfort level  Description: Interventions:  - Encourage patient to monitor pain and request assistance  - Assess pain using appropriate pain scale  - Administer analgesics based on type and severity of pain and evaluate response  - Implement non-pharmacological measures as appropriate and evaluate response  - Consider cultural and social influences on pain and pain management  - Notify physician/advanced practitioner if interventions unsuccessful or patient reports new pain  Outcome: Progressing     Problem: INFECTION - ADULT  Goal: Absence or prevention of progression during hospitalization  Description: INTERVENTIONS:  - Assess and monitor for signs and symptoms of infection  - Monitor lab/diagnostic results  - Monitor all insertion sites, i.e. indwelling lines, tubes, and drains  - Monitor endotracheal if appropriate and nasal secretions for changes in amount and color  - [place] appropriate cooling/warming therapies per order  - Administer medications as ordered  - Instruct and encourage patient and family to use good hand hygiene technique  - Identify and instruct in appropriate isolation precautions for identified infection/condition  Outcome: Progressing  Goal: Absence of fever/infection during neutropenic period  Description: INTERVENTIONS:  - Monitor WBC    Outcome: Progressing

## 2024-03-02 NOTE — PROGRESS NOTES
03/02/24 1230   Pain Assessment   Pain Assessment Tool 0-10   Pain Score No Pain   Restrictions/Precautions   Precautions Fall Risk;Cognitive;Aspiration;Hard of hearing   Weight Bearing Restrictions No   ROM Restrictions No   Cognition   Arousal/Participation Alert;Responsive;Cooperative   Attention Attends with cues to redirect   Orientation Level Oriented to person;Oriented to place   Memory Decreased short term memory;Decreased recall of recent events;Decreased recall of precautions   Following Commands Follows one step commands with increased time or repetition   Roll Left and Right   Type of Assistance Needed Supervision   Roll Left and Right CARE Score 4   Sit to Lying   Type of Assistance Needed Supervision   Sit to Lying CARE Score 4   Lying to Sitting on Side of Bed   Type of Assistance Needed Supervision   Lying to Sitting on Side of Bed CARE Score 4   Sit to Stand   Type of Assistance Needed Incidental touching   Physical Assistance Level 25% or less   Sit to Stand CARE Score 3   Bed-Chair Transfer   Type of Assistance Needed Incidental touching   Physical Assistance Level 25% or less   Chair/Bed-to-Chair Transfer CARE Score 3   Transfer Bed/Chair/Wheelchair   Stand Pivot Contact Guard   Sit to Stand Contact Guard   Stand to Sit Contact Guard   Supine to Sit Supervision   Sit to Supine Supervision   Walk 10 Feet   Type of Assistance Needed Physical assistance   Physical Assistance Level 25% or less   Walk 10 Feet CARE Score 3   Walk 50 Feet with Two Turns   Type of Assistance Needed Physical assistance   Physical Assistance Level 25% or less   Walk 50 Feet with Two Turns CARE Score 3   Walk 150 Feet   Type of Assistance Needed Physical assistance   Physical Assistance Level 25% or less   Walk 150 Feet CARE Score 3   Walking 10 Feet on Uneven Surfaces   Type of Assistance Needed Physical assistance   Physical Assistance Level 25% or less   Walking 10 Feet on Uneven Surfaces CARE Score 3   Ambulation    Primary Mode of Locomotion Prior to Admission Walk   Assist Device Roller Walker   Gait Pattern Inconsistant Maria Victoria;Decreased foot clearance   Limitations Noted In Balance;Endurance;Heel Strike;Posture;Safety;Speed;Strength;Swing   Provided Assistance with: Balance   Walk Assist Level Contact Guard   Does the patient walk? 2. Yes   Curb or Single Stair   Style negotiated Single stair   Type of Assistance Needed Physical assistance   Physical Assistance Level 26%-50%   1 Step (Curb) CARE Score 3   4 Steps   Type of Assistance Needed Physical assistance   Physical Assistance Level 26%-50%   4 Steps CARE Score 3   12 Steps   Type of Assistance Needed Physical assistance   Physical Assistance Level 26%-50%   12 Steps CARE Score 3   Stairs   Type Stairs   # of Steps 14   Weight Bearing Precautions Fall Risk;WBAT   Assist Devices Bilateral Rail   Therapeutic Interventions   Strengthening Seated and standing LE TE   Balance gait and transfer training   Other stair negotitation   Assessment   Treatment Assessment Pt completed therapy consisting of gait training, transfer training, community re-integration, and seated/standing TE all with focus on increasing LE strength, balance, endurance, activity tolerance,  and increasing safety and independence with functional mobility tasks. Pt needed cueing while completing stair training today for correct sequencing and safe technique.  Pt continues to make good progress towards therapy goals and will benefit from additional skilled PT to maximize safety and LOF with mobility tasks.   Problem List Decreased strength;Decreased range of motion;Decreased endurance;Impaired balance;Decreased cognition;Decreased safety awareness;Impaired judgement;Impaired hearing;Decreased coordination   Barriers to Discharge Inaccessible home environment   PT Barriers   Physical Impairment Decreased strength;Decreased range of motion;Decreased endurance;Impaired balance;Decreased mobility;Decreased  coordination;Decreased cognition;Impaired judgement;Decreased safety awareness;Impaired hearing   Functional Limitation Wheelchair management;Walking;Transfers;Standing;Stair negotiation;Ramp negotiation;Car transfers   Plan   Treatment/Interventions Functional transfer training;LE strengthening/ROM;Elevations;Therapeutic exercise;Endurance training;Cognitive reorientation;Patient/family training;Equipment eval/education;Bed mobility;Gait training   Progress Progressing toward goals   PT Therapy Minutes   PT Time In 1230   PT Time Out 1330   PT Total Time (minutes) 60   PT Mode of treatment - Individual (minutes) 60   Therapy Time missed   Time missed? No

## 2024-03-03 PROCEDURE — 97110 THERAPEUTIC EXERCISES: CPT

## 2024-03-03 PROCEDURE — 97530 THERAPEUTIC ACTIVITIES: CPT

## 2024-03-03 PROCEDURE — 97535 SELF CARE MNGMENT TRAINING: CPT

## 2024-03-03 RX ADMIN — LORATADINE 10 MG: 10 TABLET ORAL at 08:51

## 2024-03-03 RX ADMIN — ACETAMINOPHEN 650 MG: 325 TABLET ORAL at 21:40

## 2024-03-03 RX ADMIN — Medication 1 TABLET: at 08:51

## 2024-03-03 RX ADMIN — PRAVASTATIN SODIUM 40 MG: 40 TABLET ORAL at 17:43

## 2024-03-03 RX ADMIN — TAMSULOSIN HYDROCHLORIDE 0.8 MG: 0.4 CAPSULE ORAL at 17:43

## 2024-03-03 RX ADMIN — NYSTATIN 500000 UNITS: 100000 SUSPENSION ORAL at 08:51

## 2024-03-03 RX ADMIN — NYSTATIN 500000 UNITS: 100000 SUSPENSION ORAL at 21:41

## 2024-03-03 RX ADMIN — Medication 6 MG: at 21:40

## 2024-03-03 RX ADMIN — ENOXAPARIN SODIUM 40 MG: 40 INJECTION SUBCUTANEOUS at 08:51

## 2024-03-03 RX ADMIN — SERTRALINE HYDROCHLORIDE 25 MG: 25 TABLET ORAL at 08:51

## 2024-03-03 RX ADMIN — NYSTATIN 500000 UNITS: 100000 SUSPENSION ORAL at 17:43

## 2024-03-03 RX ADMIN — NYSTATIN 500000 UNITS: 100000 SUSPENSION ORAL at 13:02

## 2024-03-03 NOTE — QUICK NOTE
PMR on-call quick note:    Notified by nursing that patient with some gross hematuria and a few clots noted in brooks collection bag.  He is without other symptoms with recent adequate output and vitals stable.    Recommend flush brooks cath TID using Srinath syringe and 120 ml NSS; notify MD if clots do not improve, increased hematuria, decreased urinary output, increased symptoms, or unstable vitals.      Obtain CBC, BMP tomorrow.  Follow-up with urology if does not improve or worsens.    Addendum:    Urology - adjusted order to   Irrigate with 30-60 cc NS prn hematuria

## 2024-03-03 NOTE — PROGRESS NOTES
OT Daily Progress       03/03/24 0825   Pain Assessment   Pain Assessment Tool 0-10   Pain Score No Pain  (shakes head no to pain)   Eating   Type of Assistance Needed Physical assistance   Physical Assistance Level 25% or less   Comment feeding self majoity of meal and drinking from cup   Eating CARE Score 3   Sit to Lying   Type of Assistance Needed Supervision   Physical Assistance Level No physical assistance   Sit to Lying CARE Score 4   Sit to Stand   Type of Assistance Needed Incidental touching   Physical Assistance Level No physical assistance   Comment sit to stand from wheelchair   Sit to Stand CARE Score 4   Therapeutic Excerise-Strength   UE Strength Yes   Cognition   Arousal/Participation Alert;Responsive   Attention Attends with cues to redirect   Following Commands Follows one step commands with increased time or repetition   Activity Tolerance   Activity Tolerance Patient limited by fatigue   Assessment   Treatment Assessment Pt seen for follow up visit. Pt rec'd OOB in wheelchair with breakfast tray present. Observed independent self feeding with standard utensils and use of cup with handles. Funct mob from pt room to OT gym with RW and min A for safety. Verbal/tactile cues for direction in hallway. Pt engages in UE therapeutic ex for generalized endurance relative to self care. 5 mins forward and back with frequent rest breaks - verbal and tactile cueing to continue exericse. Popsicle stick sort with 100% accuracy x 20. 7# digiflex x 20 reps bilat hands. Funct mob from OT room back to patient room. Requesting to return to bed. Bed mob as noted. Left with needs in reach. Improving act ami. Pt will continue to benefit from OT to max I and safety with self care prior to discharge. Communication via white board and gestures/cues. Progress treatment as status allows.   OT Therapy Minutes   OT Time In 0825   OT Time Out 0925   OT Total Time (minutes) 60   OT Mode of treatment - Individual (minutes) 60

## 2024-03-03 NOTE — NURSING NOTE
Checked on Pt noticed blood in brooks drainage container. Vital signs are with in normal limits. Notified Medical and Urology.

## 2024-03-03 NOTE — NURSING NOTE
Irrigated Finley cath with Srinath syringe and 120 ml NSS per Dr orders. Pt tolerated well. Plan of care ongoing.

## 2024-03-04 ENCOUNTER — APPOINTMENT (OUTPATIENT)
Dept: CT IMAGING | Facility: HOSPITAL | Age: 84
End: 2024-03-04
Payer: MEDICARE

## 2024-03-04 PROBLEM — R31.9 HEMATURIA: Status: ACTIVE | Noted: 2024-03-04

## 2024-03-04 LAB
ALBUMIN SERPL BCP-MCNC: 3.2 G/DL (ref 3.5–5)
ALP SERPL-CCNC: 111 U/L (ref 34–104)
ALT SERPL W P-5'-P-CCNC: 35 U/L (ref 7–52)
ANION GAP SERPL CALCULATED.3IONS-SCNC: 8 MMOL/L
AST SERPL W P-5'-P-CCNC: 20 U/L (ref 13–39)
BASOPHILS # BLD AUTO: 0.04 THOUSANDS/ÂΜL (ref 0–0.1)
BASOPHILS NFR BLD AUTO: 1 % (ref 0–1)
BILIRUB SERPL-MCNC: 0.43 MG/DL (ref 0.2–1)
BUN SERPL-MCNC: 21 MG/DL (ref 5–25)
CALCIUM ALBUM COR SERPL-MCNC: 9.4 MG/DL (ref 8.3–10.1)
CALCIUM SERPL-MCNC: 8.8 MG/DL (ref 8.4–10.2)
CHLORIDE SERPL-SCNC: 104 MMOL/L (ref 96–108)
CO2 SERPL-SCNC: 28 MMOL/L (ref 21–32)
CREAT SERPL-MCNC: 0.58 MG/DL (ref 0.6–1.3)
EOSINOPHIL # BLD AUTO: 0.08 THOUSAND/ÂΜL (ref 0–0.61)
EOSINOPHIL NFR BLD AUTO: 2 % (ref 0–6)
ERYTHROCYTE [DISTWIDTH] IN BLOOD BY AUTOMATED COUNT: 13.9 % (ref 11.6–15.1)
GFR SERPL CREATININE-BSD FRML MDRD: 93 ML/MIN/1.73SQ M
GLUCOSE P FAST SERPL-MCNC: 86 MG/DL (ref 65–99)
GLUCOSE SERPL-MCNC: 86 MG/DL (ref 65–140)
HCT VFR BLD AUTO: 38.5 % (ref 36.5–49.3)
HGB BLD-MCNC: 12.5 G/DL (ref 12–17)
IMM GRANULOCYTES # BLD AUTO: 0.01 THOUSAND/UL (ref 0–0.2)
IMM GRANULOCYTES NFR BLD AUTO: 0 % (ref 0–2)
LYMPHOCYTES # BLD AUTO: 1 THOUSANDS/ÂΜL (ref 0.6–4.47)
LYMPHOCYTES NFR BLD AUTO: 19 % (ref 14–44)
MCH RBC QN AUTO: 31.9 PG (ref 26.8–34.3)
MCHC RBC AUTO-ENTMCNC: 32.5 G/DL (ref 31.4–37.4)
MCV RBC AUTO: 98 FL (ref 82–98)
MONOCYTES # BLD AUTO: 0.48 THOUSAND/ÂΜL (ref 0.17–1.22)
MONOCYTES NFR BLD AUTO: 9 % (ref 4–12)
NEUTROPHILS # BLD AUTO: 3.7 THOUSANDS/ÂΜL (ref 1.85–7.62)
NEUTS SEG NFR BLD AUTO: 69 % (ref 43–75)
NRBC BLD AUTO-RTO: 0 /100 WBCS
PLATELET # BLD AUTO: 450 THOUSANDS/UL (ref 149–390)
PMV BLD AUTO: 9.9 FL (ref 8.9–12.7)
POTASSIUM SERPL-SCNC: 4.3 MMOL/L (ref 3.5–5.3)
PROT SERPL-MCNC: 6.4 G/DL (ref 6.4–8.4)
RBC # BLD AUTO: 3.92 MILLION/UL (ref 3.88–5.62)
SODIUM SERPL-SCNC: 140 MMOL/L (ref 135–147)
WBC # BLD AUTO: 5.31 THOUSAND/UL (ref 4.31–10.16)

## 2024-03-04 PROCEDURE — 92526 ORAL FUNCTION THERAPY: CPT | Performed by: NURSE PRACTITIONER

## 2024-03-04 PROCEDURE — 70450 CT HEAD/BRAIN W/O DYE: CPT

## 2024-03-04 PROCEDURE — 97530 THERAPEUTIC ACTIVITIES: CPT

## 2024-03-04 PROCEDURE — 85025 COMPLETE CBC W/AUTO DIFF WBC: CPT

## 2024-03-04 PROCEDURE — 97129 THER IVNTJ 1ST 15 MIN: CPT

## 2024-03-04 PROCEDURE — 99232 SBSQ HOSP IP/OBS MODERATE 35: CPT | Performed by: PHYSICAL MEDICINE & REHABILITATION

## 2024-03-04 PROCEDURE — 80053 COMPREHEN METABOLIC PANEL: CPT

## 2024-03-04 PROCEDURE — 97116 GAIT TRAINING THERAPY: CPT

## 2024-03-04 PROCEDURE — 97110 THERAPEUTIC EXERCISES: CPT

## 2024-03-04 PROCEDURE — 97130 THER IVNTJ EA ADDL 15 MIN: CPT

## 2024-03-04 PROCEDURE — 97535 SELF CARE MNGMENT TRAINING: CPT

## 2024-03-04 RX ADMIN — NYSTATIN 500000 UNITS: 100000 SUSPENSION ORAL at 08:17

## 2024-03-04 RX ADMIN — SERTRALINE HYDROCHLORIDE 25 MG: 25 TABLET ORAL at 08:17

## 2024-03-04 RX ADMIN — Medication 1 TABLET: at 08:17

## 2024-03-04 RX ADMIN — LORATADINE 10 MG: 10 TABLET ORAL at 08:17

## 2024-03-04 RX ADMIN — NYSTATIN 500000 UNITS: 100000 SUSPENSION ORAL at 11:58

## 2024-03-04 RX ADMIN — ENOXAPARIN SODIUM 40 MG: 40 INJECTION SUBCUTANEOUS at 08:17

## 2024-03-04 RX ADMIN — PRAVASTATIN SODIUM 40 MG: 40 TABLET ORAL at 17:50

## 2024-03-04 RX ADMIN — NYSTATIN 500000 UNITS: 100000 SUSPENSION ORAL at 21:42

## 2024-03-04 RX ADMIN — NYSTATIN 500000 UNITS: 100000 SUSPENSION ORAL at 17:50

## 2024-03-04 RX ADMIN — Medication 6 MG: at 21:42

## 2024-03-04 RX ADMIN — TAMSULOSIN HYDROCHLORIDE 0.8 MG: 0.4 CAPSULE ORAL at 17:50

## 2024-03-04 NOTE — PROGRESS NOTES
PM&R PROGRESS NOTE:  Thomas Chase 83 y.o. male MRN: [id number]  Unit/Bed#: -02 Encounter: [id number]        **CHANGE IN REHAB DIAGNOSIS FROM PRE-ADMISSION SCREEN  Rehab Diagnosis: Impairment of mobility, safety, Activities of Daily Living (ADLs), and cognitive/communication skills due to Brain Dysfunction:  02.22  Traumatic, Closed Injury  Etiologic Diagnosis: L SDH, R SAH, L anterior hypodensity lateral temporal lobe    HPI: hTomas Chase is a 83 y.o. male with past medical history significant for previous fall with resultant traumatic brain injury-subdural hematoma in 2022 status post left craniotomy, chronic aphasia, hard of hearing with cochlear implant in place, hyperlipidemia who presented to the [facility] on 2/13/2024 with increased weakness and mental status change for several days.  Unclear if patient had head trauma.  CT head showing a 3 mm left frontal subdural hemorrhage.  CT lumbar spine showing DJD.  Patient found to have dysphagia and was seen by speech therapy.  VFSS on 2/15/2024 cleared him for a puréed diet with thin liquids.  Patient was also started by psychiatry for depression and started on Zoloft 25 mg daily.  Patient sustained a rapid response and a unwitnessed fall with head trauma notable bump on head on 2/15/2024.  CT head showing a new small volume acute subarachnoid hemorrhage in the left frontal opercular region as well as new nondisplaced fracture of the zygomatic arch with soft tissue contusion, stable 3 mm subdural hemorrhage seen previously.  Patient transferred to [facility] for further evaluation.  Patient seen by neurology and neurosurgery.  CTA head and neck showing a left anterior hypodensity in the left anterior lateral temporal lobe with 2 punctate hyperdensities.  These represented possible nonhemorrhagic contusions or venous infarcts.  Less likely to represent ischemia or neoplasm.  Follow-up imaging recommended with CT head in 2-3 weeks.  CT  venogram showing patent dural venous sinuses.  Patient was cleared for DVT prophylaxis and started on Keppra for seizure prophylaxis.    Medical course complicated by suspected UTI based on UA and symptoms.  Patient was treated with antibiotics x 3 days.  After medical stabilization, patient found to have acute functional deficits from his baseline in mobility, self-care, speech swallow cognition therefore admitted to [facility] for acute inpatient rehabilitation.    SUBJECTIVE: Patient seen face-to-face.  Wife is at the bedside.  Had some hematuria and blood clots over the weekend from Finley catheter.  This seems to have resolved today.  Patient with tea colored urine and encouraged increased oral fluids.  Trial of void likely later this week per urology recommendations.  H&H reviewed with patient and his wife which is stable.      ASSESSMENT: Stable, progressing      PLAN:    Rehabilitation  Functional deficits: Aphasia, dysphagia, impaired cognition, impaired balance, impulsivity, poor safety awareness impaired mobility, self care    Continue current rehabilitation plan of care to maximize function.    Estimated Discharge: TBD - may require some extra days than this Friday.     DVT prophylaxis  Continue Lovenox      * Intracranial hemorrhage (HCC)  Assessment & Plan  Initial presentation on 2/13/2024 with increased weakness in the legs, mental status changes for several days  CT head showing a 3 mm left frontal subdural hemorrhage  Rapid response 2/15/2024 with fall and head trauma  CT head showing a new small volume acute subarachnoid hemorrhage in the left frontal opercular region and new nondisplaced fracture of the zygomatic arch with soft tissue contusion.    Transferred to [facility]  CTA head and neck and and repeat CT head showing a left anterior hypodensity in the left anterior lateral temporal lobe with 2 punctate hyperdensities.  Differential diagnosis nonhemorrhagic contusions or venous  infarcts.  Patient seen by neurology and neurosurgery  Conservative management  Cleared for DVT prophylaxis  Started on Keppra for seizure prophylaxis  Repeat CT head scheduled on Monday, 3/4/2024 -personally reviewed  Radiology read as follows:  Improving left anterior temporal lobe contusions with resolving edema.   Stable subacute subdural hemorrhage along the anterior left frontal convexity. Resolved subarachnoid and intraventricular hemorrhages. Unchanged minimal hyperdensity beneath the left craniotomy flap. Stable right parafalcine subdural hygroma. There is no new intra or extra-axial hemorrhage.    Follow-up with neurosurgery on 3/6/2024 virtually  Follow-up with neurosurgery and neurology in outpatient clinic.    Behavior safety risk  Assessment & Plan  Patient demonstrating mild impulsivity in acute care  Fall x 2 in acute care  Falls at home in the past    Safety behavior plan going well and ARC.  No further impulsivity  Nursing and staff to provide close supervision near nursing station  Patient placed on 4 side rails (not as a restraint, patient and wife preference)  Monitor sleep-wake cycle  Avoid overstimulation: 1 visitor at a time, low lights, low sounds      Hematuria  Assessment & Plan  Started 3/2 - 3/3/2024  Minor, and more likely related to irritation from Finley catheter  Irrigation as needed per urology  Asymptomatic  H&H stable    Headache  Assessment & Plan  Continue Tylenol 975 mg every 8 hours scheduled    Severe protein-calorie malnutrition (HCC)  Assessment & Plan  BMI 17  Nutrition following  Optimize oral intake  Supplements ordered    Dysphagia  Assessment & Plan  VFSS completed 2/15/2024  Cleared for a puréed diet with nectar thick liquids  SLP to follow while at [facility]  Repeat VFSS this week    Urinary retention  Assessment & Plan  Finley catheter removed on [facility]  Trial of void underway  Mixed pattern  Unfortunately required multiple straight catheterizations  Finley catheter replaced  3/1/24 per urology  Flomax increased to 0.8 mg every afternoon per urology  Trial of void in 5-7 days again likely Wed or Thursday this week   Follow-up with urology as an outpatient      Hearing loss  Assessment & Plan  Chronic hearing loss  Patient status post cochlear implant  MRI contraindicated    Hypercholesterolemia  Assessment & Plan  Continue simvastatin 20 mg daily (home dose)    UTI (urinary tract infection)-resolved as of 2/21/2024  Assessment & Plan  Suspected UTI based on UA and symptoms in acute care  Completed 3 days IV antibiotics        Appreciate IM consultants medical co-management.  Labs, medications, and imaging personally reviewed.      ROS:  A ten point review of systems was completed on 03/04/24 and pertinent positives are listed in subjective section. All other systems reviewed were negative.       OBJECTIVE:   /67 (BP Location: Left arm)   Pulse 78   Temp 98.2 °F (36.8 °C) (Temporal)   Resp 16   Ht 6' (1.829 m)   Wt 55.4 kg (122 lb 2.2 oz)   SpO2 96%   BMI 16.56 kg/m²     Physical Exam  Vitals and nursing note reviewed.   Constitutional:       General: He is not in acute distress.     Comments: Thin appearing male   HENT:      Head: Normocephalic and atraumatic.      Comments: Very hard of hearing     Nose: Nose normal.      Mouth/Throat:      Mouth: Mucous membranes are moist.   Eyes:      Conjunctiva/sclera: Conjunctivae normal.   Cardiovascular:      Rate and Rhythm: Normal rate and regular rhythm.      Pulses: Normal pulses.   Pulmonary:      Effort: Pulmonary effort is normal.      Breath sounds: Normal breath sounds. No wheezing or rales.   Abdominal:      General: Bowel sounds are normal. There is no distension.      Palpations: Abdomen is soft.      Tenderness: There is no abdominal tenderness.   Genitourinary:     Comments: Finley catheter in place  Musculoskeletal:         General: No swelling.      Cervical back: Neck supple.   Skin:     General: Skin is warm.    Neurological:      Mental Status: He is alert.      Comments: Aphasia at baseline   Psychiatric:         Mood and Affect: Mood normal.          Lab Results   Component Value Date    WBC 5.31 03/04/2024    HGB 12.5 03/04/2024    HCT 38.5 03/04/2024    MCV 98 03/04/2024     (H) 03/04/2024     Lab Results   Component Value Date    SODIUM 140 03/04/2024    K 4.3 03/04/2024     03/04/2024    CO2 28 03/04/2024    BUN 21 03/04/2024    CREATININE 0.58 (L) 03/04/2024    GLUC 86 03/04/2024    CALCIUM 8.8 03/04/2024     Lab Results   Component Value Date    INR 1.02 06/02/2022    INR 1.10 05/29/2022    INR 1.04 05/28/2022    PROTIME 13.0 06/02/2022    PROTIME 13.7 05/29/2022    PROTIME 13.2 05/28/2022           Current Facility-Administered Medications:     acetaminophen (TYLENOL) tablet 650 mg, 650 mg, Oral, Q6H PRN, Daniel Clayton MD, 650 mg at 03/03/24 2140    enoxaparin (LOVENOX) subcutaneous injection 40 mg, 40 mg, Subcutaneous, Q24H JEANNIE, Alexandrea Lugo MD, 40 mg at 03/04/24 0817    loratadine (CLARITIN) tablet 10 mg, 10 mg, Oral, Daily, Amada L Dagnall, PA-C, 10 mg at 03/04/24 0817    meclizine (ANTIVERT) tablet 25 mg, 25 mg, Oral, Q8H PRN, Amada L Dagnall, PA-C    melatonin tablet 6 mg, 6 mg, Oral, HS, Amada L Dagnall, PA-C, 6 mg at 03/03/24 2140    multivitamin-minerals (CENTRUM) tablet 1 tablet, 1 tablet, Oral, Daily, Amada L Dagnall, PA-C, 1 tablet at 03/04/24 0817    nystatin (MYCOSTATIN) oral suspension 500,000 Units, 500,000 Units, Swish & Swallow, 4x Daily, Darwin Paz MD, 500,000 Units at 03/04/24 1158    polyethylene glycol (MIRALAX) packet 17 g, 17 g, Oral, Daily PRN, Ashley Depadua, MD, 17 g at 02/25/24 1219    pravastatin (PRAVACHOL) tablet 40 mg, 40 mg, Oral, Daily With Dinner, Amada Epperson PA-C, 40 mg at 03/03/24 1743    sertraline (ZOLOFT) tablet 25 mg, 25 mg, Oral, Daily, Amada Epperson PA-C, 25 mg at 03/04/24 0817    tamsulosin (FLOMAX) capsule 0.8 mg, 0.8 mg,  Oral, Daily With Dinner, Norberto Boyer MD, 0.8 mg at 03/03/24 1743    Past Medical History:   Diagnosis Date    Arthritis     Encounter for general adult medical examination without abnormal findings 03/20/2019    Fall 11/03/2022    Hyperlipidemia     Macular degeneration, wet (HCC)     Urinary retention 06/02/2022       Patient Active Problem List    Diagnosis Date Noted    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    Behavior safety risk 02/20/2024    Hematuria 03/04/2024    Headache 02/20/2024    Bilateral lower extremity weakness 02/17/2024    Abnormal CT scan, lumbar spine 02/15/2024    Severe protein-calorie malnutrition (HCC) 02/14/2024    Debility 02/13/2024    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    Dysphagia 11/21/2023    Macular degeneration, age related 02/27/2023    Traumatic subdural hemorrhage with loss of consciousness of unspecified duration, initial encounter ([facility]) 02/27/2023    Sensorineural hearing loss (SNHL), bilateral 08/18/2022    Balance disorder 06/28/2022    Abnormal echocardiogram 06/27/2022    Right bundle-branch block 06/27/2022    Moderate protein-calorie malnutrition (HCC) 06/19/2022    Hypotension 06/19/2022    Diastolic dysfunction 06/19/2022    EKG abnormalities 06/19/2022    Constipation 06/02/2022    Urinary retention 06/02/2022    SDH (subdural hematoma) (HCC) 05/27/2022    Primary osteoarthritis of left knee 05/17/2022    Hygroma 04/26/2022    Right hand pain     Carpal tunnel syndrome on right     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    Overweight (BMI 25.0-29.9) 01/27/2021    Negative depression screening 09/26/2019    Hypercholesterolemia 07/12/2018    Borderline hypertension 07/12/2018    Hearing loss 04/08/2009          Alexandrea Lugo MD    I have spent a total time of 40 minutes on 03/04/24 in caring for this patient including Importance of tx compliance, Impressions, Documenting in the medical record, Reviewing / ordering tests, medicine, procedures  ,  Communicating with other healthcare professionals , and update provided to wife today .      ** Please Note:  voice to text software may have been used in the creation of this document. Although proof errors in transcription or interpretation are a potential of such software**

## 2024-03-04 NOTE — NURSING NOTE
Transferred via litter to [facility] for CT head via transport in satisfactory condition accomp by CNA.

## 2024-03-04 NOTE — PROGRESS NOTES
03/04/24 1200   Pain Assessment   Pain Assessment Tool 0-10   Pain Score No Pain   Pain Rating: FLACC (Rest) - Face 0   Pain Rating: FLACC (Rest) - Legs 0   Pain Rating: FLACC (Rest) - Activity 0   Pain Rating: FLACC (Rest) - Cry 0   Pain Rating: FLACC (Rest) - Consolability 0   Score: FLACC (Rest) 0   Restrictions/Precautions   Precautions Aspiration;Aphasia;Bed/chair alarms;Cognitive;Fall Risk;Hard of hearing;Finley;Supervision on toilet/commode   Comprehension   Assist Devices Hearing Aid  (L cochlear implant)   Comprehension (FIM) 2 - Understands basic info/conversation 25-49% of time   Expression   Expression (FIM) 2 - Understands basic info/conversation 25-49% of time   Social Interaction   Social Interaction (FIM) 2 - Interacts appropriately 25-49% of time   Problem Solving   Problem solving (FIM) 2 - Solves basic problems 25-49% of time   Memory   Memory (FIM) 2 - Recognizes, recalls/performs 25-49%   Memory Skills   Orientation Level Oriented to person;Oriented to place;Oriented to time   Reading Comprehension   Reading Status   (reading 1-3 word phrases on white board, demonstrating understanding. Will occasionally read outloud)   Swallow Assessment   Swallow Treatment Assessment Assessed with lunch tray, patient continues to feed himself independently with some minimal encouragement for continued intake. Demonstrated carryover of swallow strategies ~60 independently for liquid washes, smaller bites and double swallow otherwise benefits from a visual cue. Occasional minimal cough with puree, nectar, honey and thin all unchanged. Pt continues to be medically tolerating his diet. Continue same diet recs   SLP Therapy Minutes   SLP Time In 1200   SLP Time Out 1230   SLP Total Time (minutes) 30   SLP Mode of treatment - Individual (minutes) 30   SLP Mode of treatment - Concurrent (minutes) 0   SLP Mode of treatment - Group (minutes) 0   SLP Mode of treatment - Co-treat (minutes) 0   SLP Mode of Treatment -  Total time(minutes) 30 minutes   SLP Cumulative Minutes 490

## 2024-03-04 NOTE — PROGRESS NOTES
Progress Note - Thomas Chase 83 y.o. male MRN: [id number]    Unit/Bed#: [facility] 213-02 Encounter: [id number]        Subjective:   Patient seen and examined at bedside after reviewing the chart and discussing the case with the caring staff.      Patient examined at bedside.  Patient has no acute symptoms. Yesterday hematuria was noted in brooks. Urology ordered irrigation with 30-60 cc NS as needed.     Physical Exam   Vitals: Blood pressure 111/67, pulse 78, temperature 98.2 °F (36.8 °C), temperature source Temporal, resp. rate 16, height 6' (1.829 m), weight 55.4 kg (122 lb 2.2 oz), SpO2 96%.,Body mass index is 16.56 kg/m².  Constitutional: Patient in no acute distress.  HEENT: PERR, EOMI, MMM.  Cardiovascular: Normal rate and regular rhythm.    Pulmonary/Chest: Effort normal and breath sounds normal.   Abdomen: Soft, + BS, NT.    Assessment/Plan:  Thomas Chase is a(n) 83 y.o. year old male with left SDH and right SAH.     1.  Cardiac with hx of HTN, HLD.  On pravastatin 40 mg daily (simvastatin nonformulary).  Continue to monitor BP.  2.  Allergic rhinitis.  Patient is on loratadine 10 mg daily.  3.  Depression/anxiety.  Patient is on Zoloft 25 mg daily.  4.  Insomnia.  Patient is on melatonin 6 mg at bedtime.  5.  Thrush.  Nystatin liquid swish and swallow 4 times daily.  6.  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  Patient is mainly nonverbal.  7.  Urinary retention.  Urinary retention protocol.  Started on Flomax 0.4 mg daily 2/21/24.  Urology consulted.  Brooks catheter placed 2/22/24.  Brooks removed on 2/28/24. Brooks placed again 3/1.   8.  Dysphagia.  Speech therapy following.  Dysphagia 1 puureed with nectar thick liquid.  9.  Pain and bowel management.  As per physiatrist.  10.  Intracranial hemorrhage.   Patient will be receiving intensive PT OT ST as per physiatrist.    Anticipated discharge date:  Friday 3/8/24  PCP:  RONY Diaz

## 2024-03-04 NOTE — PROGRESS NOTES
03/04/24 1230   Pain Assessment   Pain Assessment Tool 0-10   Pain Score No Pain   Restrictions/Precautions   Precautions Aphasia;Aspiration;Bed/chair alarms;Cognitive;Fall Risk;Finley;Hard of hearing;Impulsive;Supervision on toilet/commode   Cognition   Overall Cognitive Status Impaired   Arousal/Participation Alert;Responsive;Cooperative   Attention Attends with cues to redirect   Orientation Level Oriented to person;Oriented to place   Memory Decreased short term memory;Decreased recall of recent events;Decreased recall of precautions   Following Commands Follows one step commands with increased time or repetition   Sit to Stand   Type of Assistance Needed Incidental touching   Comment cg with RW and increased visual/tactile cues for sequence/direction   Sit to Stand CARE Score 4   Bed-Chair Transfer   Type of Assistance Needed Physical assistance   Physical Assistance Level 26%-50%   Comment cg/min A with RW; increased visual/tactile cues for sequence and direction   Chair/Bed-to-Chair Transfer CARE Score 3   Walk 10 Feet   Type of Assistance Needed Physical assistance   Physical Assistance Level 26%-50%   Comment cg/min A level and unlevel surfaces including sidewalks, uneven pavement, incline/decline; increased visual/tactile cues required for general safety as well as sequenc/direction   Walk 10 Feet CARE Score 3   Walk 50 Feet with Two Turns   Type of Assistance Needed Physical assistance   Physical Assistance Level 26%-50%   Comment cg/min A level and unlevel surfaces including sidewalks, uneven pavement, incline/decline; increased visual/tactile cues required for general safety as well as sequenc/direction   Walk 50 Feet with Two Turns CARE Score 3   Walking 10 Feet on Uneven Surfaces   Type of Assistance Needed Physical assistance   Physical Assistance Level 26%-50%   Comment cg/min A level and unlevel surfaces including sidewalks, uneven pavement, incline/decline; increased visual/tactile cues required  for general safety as well as sequenc/direction   Walking 10 Feet on Uneven Surfaces CARE Score 3   Ambulation   Primary Mode of Locomotion Prior to Admission Walk   Distance Walked (feet) 142 ft  (49' 102' 142')   Assist Device Roller Walker   Gait Pattern Inconsistant Maria Victoria;Slow Maria Victoria;Decreased foot clearance;Narrow ILIANA;Improper weight shift;Forward Flexion   Limitations Noted In Balance;Coordination;Endurance;Posture;Safety;Strength   Provided Assistance with: Balance;Direction   Walk Assist Level Contact Guard;Minimum Assist   Findings cg/min A level and unlevel surfaces including sidewalks, uneven pavement, incline/decline; increased visual/tactile cues required for general safety as well as sequenc/direction   Does the patient walk? 2. Yes   Therapeutic Interventions   Balance gait and transfer training   Assessment   Treatment Assessment Patient agreeable to therapy session.  Remains pain free.    Continues to require increased visual/tactile cues for general safety as well as sequence/technique to complete all tasks.  Trialed ambulation outside on uneven surfaces with positive results, however required increased assistance and increased cueing.   PT Barriers   Physical Impairment Decreased strength;Decreased range of motion;Decreased endurance;Impaired balance;Decreased mobility;Decreased coordination;Decreased cognition;Impaired judgement;Decreased safety awareness;Impaired hearing   Functional Limitation Walking;Transfers;Standing;Stair negotiation;Ramp negotiation;Car transfers   Plan   Treatment/Interventions Functional transfer training;Gait training   Progress Progressing toward goals   PT Therapy Minutes   PT Time In 1230   PT Time Out 1300   PT Total Time (minutes) 30   PT Mode of treatment - Individual (minutes) 30   PT Mode of treatment - Concurrent (minutes) 0   PT Mode of treatment - Group (minutes) 0   PT Mode of treatment - Co-treat (minutes) 0   PT Mode of Treatment - Total time(minutes)  30 minutes   PT Cumulative Minutes 924   Therapy Time missed   Time missed? No

## 2024-03-04 NOTE — PROGRESS NOTES
03/04/24 0800   Pain Assessment   Pain Assessment Tool 0-10   Pain Score No Pain   Restrictions/Precautions   Precautions Aspiration;Bed/chair alarms;Cognitive;Fall Risk;Finley;Hard of hearing   Weight Bearing Restrictions No   ROM Restrictions No   Eating   Type of Assistance Needed Physical assistance   Physical Assistance Level 25% or less   Comment occasional assist from OT to scoop food onto spoon/fork when pt became fatigued or distracted, self fed entirety of desired breakfast foods; reached for provalve cup unprompted frequently; coughing noted near end of breakfast as pt fatigued following medicine administration from RN with visual cues for double swallow to further clear throat   Eating CARE Score 3   Eating Assessment   Food To Mouth Yes   Noted Coughing   Positioning Upright;Out of Bed   Safety Needs Increase Time;Cues;Freq Swallow   Meal Assessed Breakfast   Intake Mode PO   Finishes Timely No   Opens Packages No   Oral Hygiene   Type of Assistance Needed Supervision   Physical Assistance Level No physical assistance   Comment visual cues for thoroughness   Oral Hygiene CARE Score 4   Grooming   Able To Comb/Brush Hair;Wash/Dry Face;Brush/Clean Teeth   Limitation Noted In Problem Solving;Safety;Sequencing   Shower/Bathe Self   Type of Assistance Needed Physical assistance   Physical Assistance Level 26%-50%   Comment assist for buttocks, bilat lower LE: Finley care wipes completed by OT; visual cues for sequencing   Shower/Bathe Self CARE Score 3   Bathing   Assessed Bath Style Sponge Bath   Anticipated D/C Bath Style Sponge Bath;Shower   Able to Gather/Transport No   Able to Wash/Rinse/Dry (body part) Left Arm;Right Arm;L Upper Leg;R Upper Leg;Chest;Abdomen   Limitations Noted in Balance;Endurance;Problem Solving;ROM;Safety;Sequencing   Positioning Seated;Standing   Upper Body Dressing   Type of Assistance Needed Physical assistance   Physical Assistance Level 26%-50%   Comment assist to doff/don  flannel shirt, doff t-shirt over head, align new t-shirt to don   Upper Body Dressing CARE Score 3   Lower Body Dressing   Type of Assistance Needed Physical assistance   Physical Assistance Level 76% or more   Comment able to assist in doffing/donning clothes over knees, pt not receptive to visual and physical cues to attempt to pull pants up and down   Lower Body Dressing CARE Score 2   Putting On/Taking Off Footwear   Type of Assistance Needed Physical assistance   Physical Assistance Level 76% or more   Comment able to present bilat feet to OT to doff/don socks   Putting On/Taking Off Footwear CARE Score 2   Dressing/Undressing Clothing   Remove UB Clothes Pullover Shirt;Jacket   Don UB Clothes Pullover Shirt;Jacket   Remove LB Clothes Pants;Undergarment;Socks   Don LB Clothes Pants;Socks;Undergarment   Limitations Noted In Balance;Endurance;Problem Solving;Safety;Sequencing;ROM   Positioning Supported Sit   Sit to Stand   Type of Assistance Needed Incidental touching   Comment CG   Sit to Stand CARE Score 4   Cognition   Overall Cognitive Status Impaired   Arousal/Participation Alert;Cooperative   Attention Attends with cues to redirect   Orientation Level Oriented to person;Oriented to place;Oriented to time   Memory Decreased recall of precautions;Decreased recall of recent events;Decreased short term memory   Following Commands Follows one step commands with increased time or repetition   Assessment   Treatment Assessment Pt agreeable to OT session this AM. Received sitting upright in recliner starting breakfast. ADL session completed; current LOF and details listed in respective sections. Pt is overall Brayan breakfast tray completion with only occasional assist to reorient to tray and scoop food onto utensil. Increased time to complete 2* fatigue, however pt able to self-feed at this pace. Pt then maxA LB dressing, Brayan bathing and UB dressing, supervision grooming; sit>stand transfer overall CG. Pt still  receptive to visual and physical cues for bathing, however more difficulty with initiating dressing tasks and requires more assist for these tasks. Finley care completed by OT. Pt continues to require frequent cueing and redirection to all tasks as well as assist for sequencing and working memory during tasks, however in good spirits. Pt's barriers to d/c include decreased strength throughout, decreased balance, impaired hearing, impaired cognition including safety awareness, problem solving, attention, comprehension, and expression, and decreased activity tolerance; all affect independence in self care and fxl transfers. Pt would benefit from continued skilled OT services in order to address listed barriers and prepare for safe d/c.   Prognosis Fair   Problem List Decreased strength;Decreased range of motion;Decreased endurance;Impaired balance;Decreased cognition;Decreased safety awareness;Impaired judgement;Impaired hearing;Decreased coordination   Plan   Treatment/Interventions ADL retraining;Functional transfer training;LE strengthening/ROM;Therapeutic exercise;Endurance training;Patient/family training;Cognitive reorientation;Equipment eval/education;Compensatory technique education;OT   Progress Progressing toward goals   OT Therapy Minutes   OT Time In 0800   OT Time Out 0903   OT Total Time (minutes) 63   OT Mode of treatment - Individual (minutes) 63

## 2024-03-04 NOTE — NURSING NOTE
Transferred back from [facility] via litter in satisfactory condition.  CGA w CNA assist back to bed.

## 2024-03-04 NOTE — PROGRESS NOTES
03/04/24 1305   Pain Assessment   Pain Assessment Tool 0-10   Pain Score No Pain   Restrictions/Precautions   Precautions Aspiration;Bed/chair alarms;Cognitive;Fall Risk;Finley;Hard of hearing   Weight Bearing Restrictions No   ROM Restrictions No   Exercise Tools   Other Exercise Tool 1 Parquetry design boards with focus on cognition and fine motor control, pt matched shapes to picture with >75% accuracy and infrequent visual cues for correct match, pt continues with difficulty noted with spatial perception and required visual cues to align triangle-shaped pieces with match on board; pt with difficulty lifting pieces around pieces already placed on board and often moved other pieces unintentionally when arranging next piece   Other Exercise Tool 2 placed pegs into foam pegboard with R hand while following pattern, pt handed random pegs to place within designated pattern row and completed with >75% accuracy, manipulated each peg effectively in R hand without error or c/o increasing soreness in R hand   Cognition   Overall Cognitive Status Impaired   Arousal/Participation Alert;Cooperative   Attention Attends with cues to redirect   Orientation Level Oriented to person;Oriented to place;Oriented to time   Memory Decreased short term memory;Decreased recall of recent events;Decreased recall of precautions   Following Commands Follows one step commands with increased time or repetition   Assessment   Treatment Assessment Pt agreeable to OT session this PM. Received sitting upright in w/c. Pt completed cognitive and fine motor activities with good tolerance. Pt used R hand to complete activities with occasoinal difficulty managing pieces of Parquetry board. Pt continues with impaired cognition during activities, particularly spatial perception and problem solving during these focused activities. Pt's wife present during session; discussed extended tub bench vs. shower seat with OT, who showed pictures of examples and  discussed benefits of each. Pt and OT to trial shower transfer tomorrow using shower chair in tub/shower to assess for pt's competence and safety; OT will report back to pt's wife. Pt's barriers to d/c include decreased strength throughout, decreased balance, impaired hearing, impaired cognition including safety awareness, problem solving, attention, comprehension, and expression, and decreased activity tolerance; all affect independence in self care and fxl transfers. Pt would benefit from continued skilled OT services in order to address listed barriers and prepare for safe d/c.   Prognosis Fair   Problem List Decreased strength;Decreased range of motion;Decreased endurance;Impaired balance;Decreased cognition;Decreased safety awareness;Impaired judgement;Impaired hearing;Decreased coordination   Plan   Treatment/Interventions ADL retraining;Functional transfer training;LE strengthening/ROM;Therapeutic exercise;Endurance training;Cognitive reorientation;Patient/family training;Equipment eval/education;Compensatory technique education;OT   Progress Progressing toward goals   OT Therapy Minutes   OT Time In 1305   OT Time Out 1358   OT Total Time (minutes) 53   OT Mode of treatment - Individual (minutes) 53

## 2024-03-04 NOTE — PROGRESS NOTES
03/04/24 0650   Pain Assessment   Pain Assessment Tool 0-10   Pain Score No Pain   Restrictions/Precautions   Precautions Aphasia;Aspiration;Bed/chair alarms;Cognitive;Fall Risk;Finley;Hard of hearing;Impulsive;Supervision on toilet/commode   Cognition   Overall Cognitive Status Impaired   Arousal/Participation Alert;Responsive;Cooperative   Attention Attends with cues to redirect   Orientation Level Oriented to person;Oriented to place  (with choices given on a white board)   Memory Decreased recall of precautions;Decreased recall of recent events;Decreased short term memory   Following Commands Follows one step commands with increased time or repetition   Roll Left and Right   Type of Assistance Needed Supervision   Comment bed rails   Roll Left and Right CARE Score 4   Lying to Sitting on Side of Bed   Type of Assistance Needed Incidental touching   Comment cg with increased visual/tactile cues   Lying to Sitting on Side of Bed CARE Score 4   Sit to Stand   Type of Assistance Needed Incidental touching   Comment cg with RW and increased visual/tactile cues   Sit to Stand CARE Score 4   Bed-Chair Transfer   Type of Assistance Needed Incidental touching   Comment cg with increased visual/tactile cues   Chair/Bed-to-Chair Transfer CARE Score 4   Walk 10 Feet   Type of Assistance Needed Incidental touching   Comment cg level and unlevel surfaces with RW; visual/tactile cues for direction   Walk 10 Feet CARE Score 4   Walk 50 Feet with Two Turns   Type of Assistance Needed Incidental touching   Comment cg level and unlevel surfaces with RW; visual/tactile cues for direction   Walk 50 Feet with Two Turns CARE Score 4   Walk 150 Feet   Type of Assistance Needed Incidental touching   Comment cg level and unlevel surfaces with RW; visual/tactile cues for direction   Walk 150 Feet CARE Score 4   Walking 10 Feet on Uneven Surfaces   Type of Assistance Needed Incidental touching   Comment cg level and unlevel surfaces with  RW; visual/tactile cues for direction   Walking 10 Feet on Uneven Surfaces CARE Score 4   Ambulation   Primary Mode of Locomotion Prior to Admission Walk   Distance Walked (feet) 181 ft  (181' 143')   Assist Device Roller Walker   Gait Pattern Inconsistant Maria Victoria;Slow Maria Victoria;Decreased foot clearance;Narrow ILIANA;Improper weight shift;Forward Flexion   Limitations Noted In Balance;Coordination;Endurance;Posture;Safety;Strength   Provided Assistance with: Balance;Direction   Walk Assist Level Contact Guard   Findings cg level and unlevel surfaces with RW; visual/tactile cues for direction   Does the patient walk? 2. Yes   Therapeutic Interventions   Strengthening supine and seated ther ex   Balance gait and transfer training   Assessment   Treatment Assessment Patient agreeable to therapy session.  No pain reported.    Continues to require cues for general safety as well as increased visual/tactile cues for sequence/technique to complete all tasks.   Dependent for Finley management. Completed ther ex for general LE strengthening; gait and transfer training focusing on sequence and technique for improved balance and safety with functional mobility using walker.  Will see patient this PM for further functional mobility training.   PT Barriers   Physical Impairment Decreased strength;Decreased range of motion;Decreased endurance;Impaired balance;Decreased mobility;Decreased coordination;Decreased cognition;Impaired judgement;Decreased safety awareness;Impaired hearing   Functional Limitation Walking;Transfers;Standing;Stair negotiation;Ramp negotiation;Car transfers   Plan   Treatment/Interventions Functional transfer training;LE strengthening/ROM;Therapeutic exercise;Bed mobility;Gait training   Progress Progressing toward goals   PT Therapy Minutes   PT Time In 0650   PT Time Out 0800   PT Total Time (minutes) 70   PT Mode of treatment - Individual (minutes) 70   PT Mode of treatment - Concurrent (minutes) 0   PT Mode  of treatment - Group (minutes) 0   PT Mode of treatment - Co-treat (minutes) 0   PT Mode of Treatment - Total time(minutes) 70 minutes   PT Cumulative Minutes 894   Therapy Time missed   Time missed? No

## 2024-03-04 NOTE — ASSESSMENT & PLAN NOTE
RESOVLED  Started 3/2 - 3/3/2024  Minor, and more likely related to irritation from Finley catheter  Irrigation as needed per urology  Asymptomatic  H&H stable

## 2024-03-05 PROCEDURE — 97110 THERAPEUTIC EXERCISES: CPT

## 2024-03-05 PROCEDURE — 97530 THERAPEUTIC ACTIVITIES: CPT

## 2024-03-05 PROCEDURE — 92526 ORAL FUNCTION THERAPY: CPT

## 2024-03-05 PROCEDURE — 97116 GAIT TRAINING THERAPY: CPT

## 2024-03-05 PROCEDURE — 97535 SELF CARE MNGMENT TRAINING: CPT

## 2024-03-05 PROCEDURE — 97537 COMMUNITY/WORK REINTEGRATION: CPT

## 2024-03-05 RX ORDER — SIMVASTATIN 20 MG
20 TABLET ORAL
Qty: 90 TABLET | Refills: 0 | Status: SHIPPED | OUTPATIENT
Start: 2024-03-05 | End: 2024-03-18

## 2024-03-05 RX ORDER — LANOLIN ALCOHOL/MO/W.PET/CERES
6 CREAM (GRAM) TOPICAL
Qty: 30 TABLET | Refills: 0 | Status: SHIPPED | OUTPATIENT
Start: 2024-03-05 | End: 2024-03-18

## 2024-03-05 RX ORDER — LACTULOSE 10 G/15ML
20 SOLUTION ORAL 2 TIMES DAILY PRN
Status: DISCONTINUED | OUTPATIENT
Start: 2024-03-05 | End: 2024-04-05 | Stop reason: HOSPADM

## 2024-03-05 RX ORDER — ANTIOX #8/OM3/DHA/EPA/LUT/ZEAX 250-2.5 MG
1 CAPSULE ORAL DAILY
Qty: 30 CAPSULE | Refills: 0 | Status: SHIPPED | OUTPATIENT
Start: 2024-03-05 | End: 2024-04-05

## 2024-03-05 RX ORDER — SERTRALINE HYDROCHLORIDE 25 MG/1
25 TABLET, FILM COATED ORAL DAILY
Qty: 30 TABLET | Refills: 0 | Status: SHIPPED | OUTPATIENT
Start: 2024-03-06 | End: 2024-03-18

## 2024-03-05 RX ORDER — BISACODYL 5 MG/1
5 TABLET, DELAYED RELEASE ORAL ONCE
Status: COMPLETED | OUTPATIENT
Start: 2024-03-05 | End: 2024-03-05

## 2024-03-05 RX ORDER — LACTULOSE 10 G/15ML
20 SOLUTION ORAL 2 TIMES DAILY PRN
Status: DISCONTINUED | OUTPATIENT
Start: 2024-03-05 | End: 2024-03-05

## 2024-03-05 RX ORDER — LORATADINE 10 MG/1
10 TABLET ORAL DAILY
Qty: 30 TABLET | Refills: 0 | Status: SHIPPED | OUTPATIENT
Start: 2024-03-05 | End: 2024-03-18

## 2024-03-05 RX ORDER — TAMSULOSIN HYDROCHLORIDE 0.4 MG/1
0.8 CAPSULE ORAL
Qty: 30 CAPSULE | Refills: 0 | Status: SHIPPED | OUTPATIENT
Start: 2024-03-05 | End: 2024-03-18

## 2024-03-05 RX ADMIN — NYSTATIN 500000 UNITS: 100000 SUSPENSION ORAL at 11:45

## 2024-03-05 RX ADMIN — Medication 6 MG: at 21:07

## 2024-03-05 RX ADMIN — LACTULOSE 20 G: 20 SOLUTION ORAL at 21:07

## 2024-03-05 RX ADMIN — LORATADINE 10 MG: 10 TABLET ORAL at 08:19

## 2024-03-05 RX ADMIN — PRAVASTATIN SODIUM 40 MG: 40 TABLET ORAL at 17:40

## 2024-03-05 RX ADMIN — NYSTATIN 500000 UNITS: 100000 SUSPENSION ORAL at 21:07

## 2024-03-05 RX ADMIN — NYSTATIN 500000 UNITS: 100000 SUSPENSION ORAL at 17:40

## 2024-03-05 RX ADMIN — POLYETHYLENE GLYCOL 3350 17 G: 17 POWDER, FOR SOLUTION ORAL at 17:40

## 2024-03-05 RX ADMIN — TAMSULOSIN HYDROCHLORIDE 0.8 MG: 0.4 CAPSULE ORAL at 17:40

## 2024-03-05 RX ADMIN — NYSTATIN 500000 UNITS: 100000 SUSPENSION ORAL at 08:20

## 2024-03-05 RX ADMIN — Medication 1 TABLET: at 08:19

## 2024-03-05 RX ADMIN — ENOXAPARIN SODIUM 40 MG: 40 INJECTION SUBCUTANEOUS at 08:19

## 2024-03-05 RX ADMIN — BISACODYL 5 MG: 5 TABLET, COATED ORAL at 11:45

## 2024-03-05 RX ADMIN — SERTRALINE HYDROCHLORIDE 25 MG: 25 TABLET ORAL at 08:19

## 2024-03-05 NOTE — PROGRESS NOTES
03/05/24 0700   Pain Assessment   Pain Assessment Tool 0-10   Pain Score No Pain   Restrictions/Precautions   Precautions Aspiration;Bed/chair alarms;Cognitive;Fall Risk;Finley;Hard of hearing   Weight Bearing Restrictions No   ROM Restrictions No   Eating   Type of Assistance Needed Physical assistance   Physical Assistance Level 25% or less   Comment occasional assist to scoop bites of food onto spoon/fork and reorient pt to meal tray, pt self fed entirety of  desired components of meal and reached for provalve cup I'ly; visual cues to double swallow when coughing   Eating CARE Score 3   Eating Assessment   Food To Mouth Yes   Noted Coughing   Positioning Upright;Out of Bed   Safety Needs Increase Time;Cues;Freq Swallow   Meal Assessed Breakfast   Intake Mode PO   Finishes Timely No   Opens Packages No   Oral Hygiene   Type of Assistance Needed Supervision   Physical Assistance Level No physical assistance   Oral Hygiene CARE Score 4   Grooming   Able To Comb/Brush Hair;Wash/Dry Face;Brush/Clean Teeth   Limitation Noted In Problem Solving;Safety;Sequencing   Shower/Bathe Self   Type of Assistance Needed Physical assistance   Physical Assistance Level 26%-50%   Comment assist for bilat lower LE, buttocks   Shower/Bathe Self CARE Score 3   Bathing   Assessed Bath Style Sponge Bath   Anticipated D/C Bath Style Shower;Sponge Bath   Able to Gather/Transport No   Able to Wash/Rinse/Dry (body part) Right Arm;Left Arm;L Upper Leg;R Upper Leg;Abdomen;Chest;Perineal Area  (Finley care completed by OT)   Limitations Noted in Balance;Endurance;Problem Solving;ROM;Safety;Sequencing;Strength   Positioning Seated;Standing   Tub/Shower Transfer   Limitations Noted In Balance;Endurance;Problem Solving;ROM;Safety;Sequencing;LE Strength   Adaptive Equipment Grab Bars;Seat with Back   Assessed Tub/shower combo   Findings pt and OT completed tub-shower transfer after ADL in prep for eventual showering upon d/c home, pt's wife  reported interested in shower seat inside of tub instead of extended tub bench due to limited space in bathroom; after demonstration and written explanation from OT, pt practiced one transfer unsuccessfully as pt apprehensive to lift LLE off of ground to lift into tub; returned to w/c and practiced again, this time successfully with increased physical and visual cues from OT to do so; overall min-modA for transfer to maintain balance, encourage safety, and problem solve/sequence   Upper Body Dressing   Type of Assistance Needed Physical assistance   Physical Assistance Level 26%-50%   Comment assist to doff/don flannel shirt, pull t-shirt off R hand and head; improvements this session overall in UB dressing as pt with less cueing required to initiate tasks as well as able to fully don new t-shirt I'ly   Upper Body Dressing CARE Score 3   Lower Body Dressing   Type of Assistance Needed Physical assistance   Physical Assistance Level 76% or more   Comment able to push clothing off from knees when seated, don pant leg over L foot, assist in pulling clothing down over L hip; continues with difficulty initiating pulling clothing over hips when requested by OT   Lower Body Dressing CARE Score 2   Putting On/Taking Off Footwear   Type of Assistance Needed Physical assistance   Physical Assistance Level 76% or more   Comment able to present bilat feet to OT to doff/don socks   Putting On/Taking Off Footwear CARE Score 2   Dressing/Undressing Clothing   Remove UB Clothes Pullover Shirt;Button Shirt   Don UB Clothes Pullover Shirt;Jacket   Remove LB Clothes Pants;Undergarment;Socks   Don LB Clothes Pants;Undergarment;Socks   Limitations Noted In Balance;Endurance;Buttoning;Problem Solving;Safety;Strength;Sequencing;ROM   Positioning Supported Sit   Sit to Stand   Type of Assistance Needed Incidental touching   Comment CG   Sit to Stand CARE Score 4   Bed-Chair Transfer   Type of Assistance Needed Physical assistance    Physical Assistance Level 25% or less   Comment Brayan with physical cues to reach for w/c prior to sitting   Chair/Bed-to-Chair Transfer CARE Score 3   Transfer Bed/Chair/Wheelchair   Limitations Noted In Balance;Endurance;Problem Solving;Sequencing;LE Strength   Cognition   Overall Cognitive Status Impaired   Arousal/Participation Alert;Cooperative   Attention Attends with cues to redirect   Orientation Level Oriented to person;Oriented to place;Oriented to time   Memory Decreased short term memory;Decreased recall of recent events;Decreased recall of precautions   Following Commands Follows one step commands with increased time or repetition   Assessment   Treatment Assessment Pt agreeable to OT session this AM. Received lying supine in bed. ADL session completed; current LOF and details listed in respective sections. Pt is overall maxA LB dressing, Brayan bathing and UB dressing, supervision oral care. Pt continues to respond with fair success to visual and physical cues, however did demonstrate increased task initiation and command following during ADL tasks. Tub-shower transfer completed to assess pt's safety and competence using regular shower chair instead of extended bench; pt with one unsuccessful and one successful attempt and will require more practice prior to d/c. Pt self-fed entirety of breakfast tray with very infrequent instances of OT reorienting pt to tray and scooping food onto spoon to continue attending. Pt's barriers to d/c include decreased strength throughout, decreased balance, impaired hearing, impaired cognition including safety awareness, problem solving, attention, comprehension, and expression, and decreased activity tolerance; all affect independence in self care and fxl transfers. Pt would benefit from continued skilled OT services in order to address listed barriers and prepare for safe d/c.   Prognosis Fair   Problem List Decreased strength;Decreased range of motion;Decreased  endurance;Impaired balance;Decreased cognition;Decreased safety awareness;Impaired judgement;Impaired hearing;Decreased coordination   Plan   Treatment/Interventions ADL retraining;Functional transfer training;LE strengthening/ROM;Therapeutic exercise;Endurance training;Cognitive reorientation;Patient/family training;Equipment eval/education;Compensatory technique education;OT   Progress Progressing toward goals   OT Therapy Minutes   OT Time In 0700   OT Time Out 0835   OT Total Time (minutes) 95   OT Mode of treatment - Individual (minutes) 95

## 2024-03-05 NOTE — PLAN OF CARE
Problem: Prexisting or High Potential for Compromised Skin Integrity  Goal: Skin integrity is maintained or improved  Description: INTERVENTIONS:  - Identify patients at risk for skin breakdown  - Assess and monitor skin integrity  - Assess and monitor nutrition and hydration status  - Monitor labs   - Assess for incontinence   - Turn and reposition patient  - Assist with mobility/ambulation  - Relieve pressure over bony prominences  - Avoid friction and shearing  - Provide appropriate hygiene as needed including keeping skin clean and dry  - Evaluate need for skin moisturizer/barrier cream  - Collaborate with interdisciplinary team   - Patient/family teaching  - Consider wound care consult   Outcome: Progressing      This was a shared visit with the MARY. I reviewed and verified the documentation and independently performed the documented:

## 2024-03-05 NOTE — CASE MANAGEMENT
CM received a phone call from Heather at [facility] , will approval for all days thru to 3/7. Heather aware patient is for d/c on 3/7. If patient does not discharge, then she would like an update. If patient does discharge, call with date and disposition.

## 2024-03-05 NOTE — NURSING NOTE
Pt has no pain at this time. Finley care done. Finley draining tiana colored urine. Pt resting in bed with call bell in reach. Respirations even and unlabored Plan of care on going.

## 2024-03-05 NOTE — PROGRESS NOTES
03/05/24 1200   Pain Assessment   Pain Assessment Tool 0-10   Pain Score No Pain   Restrictions/Precautions   Precautions Aspiration;Aphasia;Bed/chair alarms;Cognitive;Fall Risk;Supervision on toilet/commode;Finley;Hard of hearing   Weight Bearing Restrictions No   ROM Restrictions No   Cognitive Linguisitic Assessments   Cognitive Linquistic Quick Test (CLQT) BCAT 8/21   Comprehension   Assist Devices Hearing Aid   QI: Comprehension 2. Sometimes Understands: Understands only basic conversations or simple, direct phrases. Frequently requires cues to understand   Comprehension (FIM) 2 - Understands basic info/conversation 25-49% of time   Expression   Non-Verbal Basic   QI: Expression 2. Frequently exhibits difficulty with expressing needs and ideas   Expression (FIM) 2 - Understands basic info/conversation 25-49% of time   Social Interaction   Social Interaction (FIM) 2 - Interacts appropriately 25-49% of time   Problem Solving   Problem solving (FIM) 2 - Solves basic problems 25-49% of time   Memory   Memory (FIM) 2 - Recognizes, recalls/performs 25-49%   Memory Skills   Orientation Level Oriented to person;Oriented to place   Consistencies Assessed and Performance   Materials Admnistered Puree/Level 1;Nectar thick liquid   Oral Stage Mild impaired   Phargngeal Stage Moderate impaired   Swallow Mechanics Mild delayed;Swallow initation   Strategies and Efficacy small bites, small sips, slow rate   Recommendations   Risk for Aspiration Present   Recommendations Consider oral diet   Diet Solid Recommendation Level 1 Dysphagia/pureed   Diet Liquid Recommendation Nectar thick liquid   Recommended Form of Meds Crushed;With puree   General Precautions Aspiration precautions   Compensatory Swallowing Strategies Alternate solids and liquids   Results Reviewed with PT/Family/Caregiver   Eating   Type of Assistance Needed Physical assistance   Physical Assistance Level 25% or less   Comment occassional assist, cues   Eating  "CARE Score 3   Swallow Assessment   Swallow Treatment Assessment Assessed with lunch tray today, patient continues to self feed.  Pt left to his own devices today with more observation than cueing, pt demonstrating decreased strategy use including decreased use of secondary swallow, liquid wash, alt solids/liquids.  Pt tipping head back when using double handled cup, benefits from cues to not tip his head.  Pt reporting fatigue, shaking head \"no\" during lunch in second portion.  High consumption, mild cough throughout is productive x3.  Cued swallows, cued positioning, cued pacing, cued alt all benefited pt's swallow completion.  Pt's wife does well with cues, intervening when needed, encourages PO intake appropriately.   SLP Therapy Minutes   SLP Time In 1200   SLP Time Out 1300   SLP Total Time (minutes) 60   SLP Mode of treatment - Individual (minutes) 30   SLP Mode of treatment - Concurrent (minutes) 0   SLP Mode of treatment - Group (minutes) 30   SLP Mode of treatment - Co-treat (minutes) 0   SLP Mode of Treatment - Total time(minutes) 60 minutes   SLP Cumulative Minutes 550   Therapy Time missed   Time missed? No       "

## 2024-03-05 NOTE — PROGRESS NOTES
Progress Note - Thomas Chase 83 y.o. male MRN: [id number]    Unit/Bed#: [facility] 213-02 Encounter: [id number]        Subjective:   Patient seen and examined at bedside after reviewing the chart and discussing the case with the caring staff.      Patient examined at bedside.  Patient has no acute symptoms.    Patient's labs from 3/4/2024 including CMP and CBC were within normal limits.    Physical Exam   Vitals: Blood pressure 93/61, pulse (!) 107, temperature (!) 97.3 °F (36.3 °C), temperature source Temporal, resp. rate 18, height 6' (1.829 m), weight 55.4 kg (122 lb 2.2 oz), SpO2 95%.,Body mass index is 16.56 kg/m².  Constitutional: Patient in no acute distress.  HEENT: PERR, EOMI, MMM.  Cardiovascular: Normal rate and regular rhythm.    Pulmonary/Chest: Effort normal and breath sounds normal.   Abdomen: Soft, + BS, NT.    Assessment/Plan:  Thomas Chase is a(n) 83 y.o. year old male with left SDH and right SAH.     1.  Cardiac with hx of HTN, HLD.  On pravastatin 40 mg daily (simvastatin nonformulary).  Continue to monitor BP.  2.  Allergic rhinitis.  Patient is on loratadine 10 mg daily.  3.  Depression/anxiety.  Patient is on Zoloft 25 mg daily.  4.  Insomnia.  Patient is on melatonin 6 mg at bedtime.  5.  Thrush.  Nystatin liquid swish and swallow 4 times daily.  6.  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  Patient is mainly nonverbal.  7.  Urinary retention.  Urinary retention protocol.  Started on Flomax 0.4 mg daily 2/21/24.  Urology consulted.  Finley catheter placed 2/22/24.  Finley removed on 2/28/24. Finley placed again 3/1.   8.  Dysphagia.  Speech therapy following.  Dysphagia 1 puureed with nectar thick liquid.  9.  Pain and bowel management.  As per physiatrist.  10.  Intracranial hemorrhage.   Patient will be receiving intensive PT OT ST as per physiatrist.    Anticipated discharge date:  Friday 3/8/24  PCP:  RONY Diaz

## 2024-03-05 NOTE — DISCHARGE SUMMARY
Discharge Summary   Thomas Chase 83 y.o. male MRN: [id number]  Unit/Bed#: -02 Encounter: [id number]    Admission Date: 2/20/2024     Discharge Date:  4/5/2024    Etiologic/Rehabilitation Diagnosis: Impairment of mobility, safety and Activities of Daily Living (ADLs) due to Stroke:  01.9 Other Stroke left frontal SAH    HPI:  Thomas Chase is a(n) 83 y.o. year old male who was admitted to acute rehabilitation following left frontal SAH after an unwitnessed fall at the hospital.  Patient was initially admitted to the hospital on 2/13/2024 with generalized weakness and debility.  While in the hospital patient experienced an unwitnessed fall in the bathroom on 2/16/2024.  Patient CT scan of the head showed left-sided acute SAH on 2/16/2024. Patient is now admitted to acute rehabilitation to receive extensive PT OT and SLP as per physiatrist.  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  Patient is also experiencing urinary obstruction requiring straight catheterization.    During the course of his stay at inpatient acute rehabilitation patient received treatment for the following medical conditions.  1.  Cardiac with hx of HTN, HLD/hypotension.  On pravastatin 40 mg daily (simvastatin nonformulary).  Noted to have low BP 3/10/24.  Patient's orthostatic vitals did not show significant drop.  Received IV fluids and started on midodrine.  Midodrine increased to 5 mg TID 3/21/24.  Continue to monitor.   2.  Allergic rhinitis.  Patient is on loratadine 10 mg daily.   3.  Depression/anxiety.  Patient is on Zoloft 25 mg daily.   4.  Insomnia.  Patient is on melatonin 6 mg at bedtime.   5.  Thrush.  Completed clotrimazole lozenges x 10 days on 3/21/24.   6.  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  Patient is mainly nonverbal.   7.  Urinary retention/UTI.  Urinary retention protocol.  Flomax decreased to 0.4 mg daily  3/12/2024 due to possible cause of hypotension.  Finley catheter placed 2/22/24, removed on 2/28/24, placed again 3/1/24, removed again 3/7/24 and reinserted 3/8/24.  Finley removed 3/22/24 and has been voiding well on own.   8.  Dysphagia.  Speech therapy following.  Dysphagia 1 puureed with nectar thick liquid.  Video swallow study done on 3/11/2024.  9.  Vitamin B12 deficiency.  Patient started on vitamin B12 500 mcg daily.   10.  Pain and bowel management.  As per physiatrist.   11.  Intracranial hemorrhage.   Patient is receiving intensive PT OT ST as per physiatrist.        Physiatry Additions/Comorbidities:    Intracranial hemorrhage (HCC)-resolved as of 3/21/2024  Assessment & Plan  Initial presentation on 2/13/2024 with increased weakness in the legs, mental status changes for several days  CT head showing a 3 mm left frontal subdural hemorrhage  Rapid response 2/15/2024 with fall and head trauma  CT head showing a new small volume acute subarachnoid hemorrhage in the left frontal opercular region and new nondisplaced fracture of the zygomatic arch with soft tissue contusion.    Transferred to [facility]  CTA head and neck and and repeat CT head showing a left anterior hypodensity in the left anterior lateral temporal lobe with 2 punctate hyperdensities.  Differential diagnosis nonhemorrhagic contusions or venous infarcts.  Patient seen by neurology and neurosurgery  Conservative management  Cleared for DVT prophylaxis  Started on Keppra for seizure prophylaxis  Repeat CT head scheduled on Monday, 3/4/2024 -personally reviewed  Radiology read as follows:  Improving left anterior temporal lobe contusions with resolving edema. Stable subacute subdural hemorrhage along the anterior left frontal convexity. Resolved subarachnoid and intraventricular hemorrhages. Unchanged minimal hyperdensity beneath the left craniotomy flap. Stable right parafalcine subdural hygroma. There is no new intra or extra-axial  hemorrhage.  Follow-up with neurosurgery on 3/6/2024 virtually completed.  Recommendations as follows: Neurosurgery will sign off, patient will follow-up in 2 weeks with repeat CT head   CTH 3/16/24: No acute intracranial abnormality.Stable thin chronic left frontal subdural collection stable since 11/10/2023. Evaluation of the left temporal lobe is limited due to artifact from the cochlear implant but there may be mild developing encephalomalacia related to prior contusion.  Follow-up with neurosurgery and neurology in outpatient clinic (appointment is 3/21/24 with neurology in 3/22/2024 with neurosurgery)  Follow-up with Neurology in 3 months  Follow-up with Neurosurgery PRN      Severe protein-calorie malnutrition (HCC)  Assessment & Plan  BMI 17.31.  Improved during ARC  Nutrition following  Optimize oral intake  Supplements ordered  He is eating % of meals  Hydration is variable, but better with encouragement  Maintain IV  IVF PRN  SLP working towards free water protocol  Possible future consideration for PEG        Dysphagia  Assessment & Plan  VFSS completed 2/15/2024  Cleared for a puréed diet with nectar thick liquids  SLP to follow while at [facility]  Repeat VFSS 3/11/24: Mild oral dysphagia, Moderate oral-pharyngeal dysphagia  Recommendations:  Diet: Dysphagia 1 pureed   Liquids: Nectar   Patient doing very well with oral food eating % of all of his meals.  Also gaining weight.  At times has difficulty with fluid intake due to nectar thick liquids and dysphagia.  Repeat swallow study recommended in 3-4 weeks  Supplement with IV fluids if needed  SLP also working towards a free water nectar thick protocol with oral care prior  Possible future consideration for PEG       Urinary retention  Assessment & Plan  Finley catheter removed on ARC  Mixed pattern  Unfortunately required multiple straight catheterizations  Finley catheter replaced 3/1/24 per urology  Flomax reduced to 0.4 mg daily due to  hypotension   Trial of void 3/7/2024 - failed this  Finley re-inserted 3/8/24  Patient likely to require longer time prior to removal of Finley catheter -recommend removal in 2 weeks around 3/22/2024  Finley removed and Trial of void started 3/22/2024  Slow in the beginning however after good BM patient was starting to void more with lower PVRs.  Having both continent and incontinent episodes -mixed pattern  Timed toilet frequently  Continence significantly improved  SUCCESSFULLY VOIDING  Bladder scans now only for lack of void over shift - has not need any in several days.   Outpatient follow-up with Dr. Boyer.    Suspected has BPH - may need imaging.      Acute Rehabilitation Center Course: Patient participated in a comprehensive interdisciplinary inpatient rehabilitation program which included involvment of Rehab MD, therapies (PT, OT, and/or SLP), RN, CM, SW, dietary, and psychology services.     Significant Findings, Care, Treatment and Services Provided: Acute comprehensive interdisciplinary inpatient rehabilitation including PT, OT, SLP, RN, CM, SW, dietary, psychology, etc.    *PATIENT RECOMMENDED TO HAVE 24/7 SUPERVISION AND ASSISTANCE IN TRANSITION PERIOD FROM [facility] TO HOME.  TIME PERIOD COULD BE 2-3 WEEKS.    Patient will continue his rehabilitative course with RN, PT, OT, SLP, Aide    Extensive hand on family training was completed prior to discharge from [facility].      Functional Status Upon Admission to [facility]:  Physical Therapy: Transfers are moderate assistance level, ambulation min assist 8 feet with rolling walker  Occupational Therapy: Eating is moderate assist, upper body ADLs moderate assist, lower body ADLs max assist, toileting max assist  Speech Therapy: Puréed diet with thin liquids    Functional Status Upon Discharge from [facility]:   Physical Therapy Occupational Therapy Speech Therapy   Weight Bearing Status: Weight Bearing as Tolerated  Transfers: Incidental Touching, Supervision  Bed Mobility: Incidental  Touching, Supervision  Amulation Distance (ft): 212 feet  Ambulation: Incidental Touching  Assistive Device for Ambulation: Roller Walker  Wheelchair Mobility Distance: 133 ft  Wheelchair Mobility: Maximum Assistance (max visual and tactile cues)  Number of Stairs: 14  Assistive Device for Stairs: Bilateral Hand Rails  Stair Assistance: Incidental Touching  Ramp: Incidental Touching  Assistive Device for Ramp: Roller Walker  Discharge Recommendations: Home with:  DC Home with:: Family Support, First Floor Setup, Home Physical Therapy   Eating: Supervision  Grooming: Supervision  Bathing: Minimal Assistance  Bathing: Minimal Assistance  Upper Body Dressing: Minimal Assistance  Lower Body Dressing: Maximum Assistance  Toileting: Maximum Assistance  Tub/Shower Transfer: Incidental Touching  Toilet Transfer: Supervision  Cognition: Exceptions to WNL  Cognition: Decreased Executive Functions, Decreased Attention, Decreased Comprehension, Decreased Safety  Orientation: Person, Place   Mode of Communication: Non-verbal  Speech/Language: Expressive Aphasia  Cognition: Exceptions to WNL  Cognition: Decreased Memory, Decreased Executive Functions, Decreased Attention, Decreased Comprehension, Decreased Safety  Orientation: Person, Time, Situation, Place (variable at times)  Swallowing: Exceptions to WNL  Swallowing: Oral Dysphagia, Pharyngeal Dysphagia, Aspiration Risk  Diet Recommendations: Level 1/Puree, [place] Thick  Discharge Recommendations:  (pending d/c home)  DC Home with:: 24 Hour Supervision, Family Support, Home Speech Therapy       Discharge Diagnoses:   Patient Active Problem List    Diagnosis Date Noted    Insomnia 03/21/2024    Abnormal CT of the head 03/21/2024    Hematuria 03/04/2024    Headache 02/20/2024    Behavior safety risk 02/20/2024    Bilateral lower extremity weakness 02/17/2024    Abnormal CT scan, lumbar spine 02/15/2024    Severe protein-calorie malnutrition (HCC) 02/14/2024    Debility  02/13/2024    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    Dysphagia 11/21/2023    Macular degeneration, age related 02/27/2023    H/O traumatic subdural hematoma 02/27/2023    Sensorineural hearing loss (SNHL), bilateral 08/18/2022    Balance disorder 06/28/2022    Abnormal echocardiogram 06/27/2022    Right bundle-branch block 06/27/2022    Low BP 06/19/2022    Diastolic dysfunction 06/19/2022    EKG abnormalities 06/19/2022    Constipation due to neurogenic bowel 06/02/2022    Urinary retention 06/02/2022    SDH (subdural hematoma) ([facility]) 05/27/2022    Primary osteoarthritis of left knee 05/17/2022    Hygroma 04/26/2022    Right hand pain     Carpal tunnel syndrome on right     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    Overweight (BMI 25.0-29.9) 01/27/2021    Negative depression screening 09/26/2019    Hypercholesterolemia 07/12/2018    Borderline hypertension 07/12/2018    Hearing loss 04/08/2009       Discharge Medications:   See after visit summary for reconciled discharge medications provided to patient and family.      Condition at Discharge: fair     Discharge instructions/Information to patient and family:   See after visit summary for information provided to patient and family.      Provisions for Follow-Up Care:  See after visit summary for information related to follow-up care and any pertinent home health orders.      Future Appointments   Date Time Provider Department Center   4/29/2024  2:00 PM Dani Worthington MD [facility]   4/30/2024  9:40 AM RONY Diaz [facility]   5/21/2024  3:00 PM Barry Merchant. [facility]   6/10/2024  3:00 PM Dani Worthington MD [facility]   11/1/2024  9:00 AM RONY Diaz [facility]       Disposition: HOME with home care     Planned Readmission: No    Discharge Statement   I spent 45 minutes discharging the patient. This time was spent on the day of discharge. I had direct contact with  the patient on the day of discharge. Greater than 50% of the total time was spent examining patient, answering all patient questions, arranging and discussing plan of care with patient as well as directly providing post-discharge instructions. Additional time then spent on discharge activities.    Discharge Medications:  See after visit summary for reconciled discharge medications provided to patient and family.      Facility Administered Medications Prior to Discharge:    Current Facility-Administered Medications   Medication Dose Route Frequency Provider Last Rate    acetaminophen  650 mg Oral Q6H PRN Daniel Clayton MD      bisacodyl  10 mg Rectal Daily PRN Darwin Paz MD      cyanocobalamin  500 mcg Oral Daily Darwin Paz MD      lactulose  20 g Oral BID PRN Darwin Paz MD      loratadine  10 mg Oral Daily Amada Epperson, PA-C      meclizine  25 mg Oral Q8H PRN Amada Epperson, PA-C      melatonin  9 mg Oral HS Alexandrea Lugo MD      midodrine  5 mg Oral TID AC Amada Epperson, PA-C      multivitamin-minerals  1 tablet Oral Daily Amada Epperson, PA-C      polyethylene glycol  17 g Oral Daily PRN Ashley Depadua, MD      pravastatin  40 mg Oral Daily With Dinner CHRISTIAN Talbot-C      senna  1 tablet Oral HS Alexandrea Lugo MD      sertraline  25 mg Oral Daily Amada Epperson, PA-C      tamsulosin  0.4 mg Oral Daily With Dinner Darwin Paz MD

## 2024-03-05 NOTE — PROGRESS NOTES
03/05/24 0850   Pain Assessment   Pain Assessment Tool 0-10   Pain Score No Pain   Restrictions/Precautions   Precautions Aphasia;Aspiration;Bed/chair alarms;Cognitive;Fall Risk;Finley;Hard of hearing;Supervision on toilet/commode   Cognition   Overall Cognitive Status Impaired   Arousal/Participation Alert;Responsive;Cooperative   Attention Attends with cues to redirect   Orientation Level Oriented to person;Oriented to place;Oriented to time   Memory Decreased short term memory;Decreased recall of recent events;Decreased recall of precautions   Following Commands Follows one step commands with increased time or repetition   Sit to Lying   Type of Assistance Needed Supervision   Comment visual/tactile cues   Sit to Lying CARE Score 4   Sit to Stand   Type of Assistance Needed Supervision   Comment close S with RW; increased visual/tactile cues   Sit to Stand CARE Score 4   Bed-Chair Transfer   Type of Assistance Needed Incidental touching;Verbal cues   Comment cg with RW; increased visual/tactile cues   Chair/Bed-to-Chair Transfer CARE Score 4   Walk 10 Feet   Type of Assistance Needed Incidental touching;Supervision   Comment cg/close S with RW level and unlevel surfaces; increased visual/tactile cues   Walk 10 Feet CARE Score 4   Walk 50 Feet with Two Turns   Type of Assistance Needed Incidental touching;Supervision   Comment cg/close S with RW level and unlevel surfaces; increased visual/tactile cues   Walk 50 Feet with Two Turns CARE Score 4   Walk 150 Feet   Type of Assistance Needed Incidental touching;Supervision   Comment cg/close S with RW level and unlevel surfaces; increased visual/tactile cues   Walk 150 Feet CARE Score 4   Walking 10 Feet on Uneven Surfaces   Type of Assistance Needed Incidental touching;Supervision   Comment cg/close S with RW level and unlevel surfaces; increased visual/tactile cues   Walking 10 Feet on Uneven Surfaces CARE Score 4   Ambulation   Primary Mode of Locomotion Prior to  Admission Walk   Distance Walked (feet) 170 ft  (154' 170' 153' 137')   Assist Device Roller Walker   Gait Pattern Inconsistant Maria Victoria;Slow Maria Victoria;Decreased foot clearance;Forward Flexion;Narrow ILIANA;Improper weight shift   Limitations Noted In Balance;Coordination;Device Management;Endurance;Posture;Safety;Strength   Provided Assistance with: Balance;Direction   Walk Assist Level Contact Guard;Close Supervision   Findings cg/close S with RW level and unlevel surfaces; increased visual/tactile cues   Does the patient walk? 2. Yes   Curb or Single Stair   Style negotiated Single stair   Type of Assistance Needed Incidental touching   Comment cg; increased visual/tactile cues for sequence/direction   1 Step (Curb) CARE Score 4   4 Steps   Type of Assistance Needed Incidental touching   Comment cg; increased visual/tactile cues for sequence/direction   4 Steps CARE Score 4   12 Steps   Type of Assistance Needed Incidental touching   Comment cg; increased visual/tactile cues for sequence/direction   12 Steps CARE Score 4   Stairs   Type Stairs;Ramp   # of Steps 14   Weight Bearing Precautions WBAT;Fall Risk   Assist Devices Bilateral Rail;Roller Walker   Findings cg; increased visual/tactile cues for sequence/direction; cg/occ min A ramp with increased visua/tactile cues for sequence/direction   Therapeutic Interventions   Strengthening supine and seated ther ex   Balance gait and transfer training   Other ramp and stair negotiation   Assessment   Treatment Assessment Patient agreeable to therapy session.  No pain reported.   Continues to required increased visual/tactile cues for general safety, sequene, technique, and direction to complete all tasks.   Dependent Finley management.  Completed ther ex for general LE strengthening; gait and transfer training focusing on sequence and technique for improved balance and safety with functional mobility using walker.  Negotiated ramp cg/occ MIN A and steps with cg with  increased cues for sequence/technique.  Patient returned to bed with call bell and all needs within reach.   PT Barriers   Physical Impairment Decreased strength;Decreased range of motion;Decreased endurance;Impaired balance;Decreased mobility;Decreased coordination;Decreased cognition;Impaired judgement;Decreased safety awareness;Impaired hearing   Functional Limitation Wheelchair management;Walking;Transfers;Standing;Stair negotiation;Ramp negotiation;Car transfers   Plan   Treatment/Interventions Functional transfer training;LE strengthening/ROM;Elevations;Therapeutic exercise;Bed mobility;Gait training   Progress Progressing toward goals   PT Therapy Minutes   PT Time In 0850   PT Time Out 1047   PT Total Time (minutes) 117   PT Mode of treatment - Individual (minutes) 117   PT Mode of treatment - Concurrent (minutes) 0   PT Mode of treatment - Group (minutes) 0   PT Mode of treatment - Co-treat (minutes) 0   PT Mode of Treatment - Total time(minutes) 117 minutes   PT Cumulative Minutes 1041   Therapy Time missed   Time missed? No

## 2024-03-05 NOTE — NURSING NOTE
Patient awake and alert. Skin warm and dry. Respirations easy on room air. Finley Cath patent and draining clear tiana urine. Participated in therapy sessions and tolerated same well.  Wife supportive and present at bedside.  No complaints of pain or discomfort noted.

## 2024-03-06 ENCOUNTER — TELEPHONE (OUTPATIENT)
Dept: NEUROLOGY | Facility: CLINIC | Age: 84
End: 2024-03-06

## 2024-03-06 PROBLEM — K59.2 CONSTIPATION DUE TO NEUROGENIC BOWEL: Status: ACTIVE | Noted: 2022-06-02

## 2024-03-06 PROCEDURE — 97110 THERAPEUTIC EXERCISES: CPT

## 2024-03-06 PROCEDURE — 92526 ORAL FUNCTION THERAPY: CPT | Performed by: NURSE PRACTITIONER

## 2024-03-06 PROCEDURE — 97535 SELF CARE MNGMENT TRAINING: CPT

## 2024-03-06 PROCEDURE — 97116 GAIT TRAINING THERAPY: CPT

## 2024-03-06 PROCEDURE — 99233 SBSQ HOSP IP/OBS HIGH 50: CPT | Performed by: PHYSICAL MEDICINE & REHABILITATION

## 2024-03-06 PROCEDURE — 97530 THERAPEUTIC ACTIVITIES: CPT

## 2024-03-06 PROCEDURE — 97129 THER IVNTJ 1ST 15 MIN: CPT

## 2024-03-06 PROCEDURE — 99232 SBSQ HOSP IP/OBS MODERATE 35: CPT | Performed by: NURSE PRACTITIONER

## 2024-03-06 RX ORDER — DOCUSATE SODIUM 100 MG/1
100 CAPSULE, LIQUID FILLED ORAL 2 TIMES DAILY
Status: DISCONTINUED | OUTPATIENT
Start: 2024-03-06 | End: 2024-03-06

## 2024-03-06 RX ORDER — BISACODYL 10 MG
10 SUPPOSITORY, RECTAL RECTAL ONCE
Status: COMPLETED | OUTPATIENT
Start: 2024-03-06 | End: 2024-03-06

## 2024-03-06 RX ORDER — SENNOSIDES 8.6 MG
1 TABLET ORAL
Status: DISCONTINUED | OUTPATIENT
Start: 2024-03-06 | End: 2024-04-05 | Stop reason: HOSPADM

## 2024-03-06 RX ORDER — BISACODYL 10 MG
10 SUPPOSITORY, RECTAL RECTAL DAILY PRN
Status: DISCONTINUED | OUTPATIENT
Start: 2024-03-06 | End: 2024-03-07

## 2024-03-06 RX ADMIN — NYSTATIN 500000 UNITS: 100000 SUSPENSION ORAL at 08:16

## 2024-03-06 RX ADMIN — NYSTATIN 500000 UNITS: 100000 SUSPENSION ORAL at 21:30

## 2024-03-06 RX ADMIN — SERTRALINE HYDROCHLORIDE 25 MG: 25 TABLET ORAL at 08:16

## 2024-03-06 RX ADMIN — Medication 6 MG: at 21:30

## 2024-03-06 RX ADMIN — BISACODYL 10 MG: 10 SUPPOSITORY RECTAL at 17:30

## 2024-03-06 RX ADMIN — NYSTATIN 500000 UNITS: 100000 SUSPENSION ORAL at 12:54

## 2024-03-06 RX ADMIN — NYSTATIN 500000 UNITS: 100000 SUSPENSION ORAL at 17:30

## 2024-03-06 RX ADMIN — SENNOSIDES 8.6 MG: 8.6 TABLET, FILM COATED ORAL at 21:30

## 2024-03-06 RX ADMIN — TAMSULOSIN HYDROCHLORIDE 0.8 MG: 0.4 CAPSULE ORAL at 17:30

## 2024-03-06 RX ADMIN — Medication 1 TABLET: at 08:16

## 2024-03-06 RX ADMIN — PRAVASTATIN SODIUM 40 MG: 40 TABLET ORAL at 17:30

## 2024-03-06 RX ADMIN — ENOXAPARIN SODIUM 40 MG: 40 INJECTION SUBCUTANEOUS at 08:16

## 2024-03-06 RX ADMIN — LORATADINE 10 MG: 10 TABLET ORAL at 08:16

## 2024-03-06 NOTE — PROGRESS NOTES
03/06/24 1338   Pain Assessment   Pain Assessment Tool 0-10   Pain Score No Pain   Restrictions/Precautions   Precautions Aspiration;Bed/chair alarms;Cognitive;Fall Risk;Finley;Hard of hearing   Weight Bearing Restrictions No   ROM Restrictions No   Sit to Lying   Type of Assistance Needed Supervision   Physical Assistance Level No physical assistance   Sit to Lying CARE Score 4   Sit to Stand   Type of Assistance Needed Physical assistance   Physical Assistance Level 25% or less   Sit to Stand CARE Score 3   Exercise Tools   Hand Gripper 3x10 digit flexion/extension in bilat hands using 7# digi flex   Other Exercise Tool 1 3x10 internal/external rotation and pronation/supination using yellow flexbar   Other Exercise Tool 2 popsicle stick match activity, 100% accuracy of placing sticks in matching pockets, used R hand with increased time and one instance of dropping sticks   Other Exercise Tool 3 card matching activity placing cards in matching pockets with RUE, increased time to complete and one error easily corrected by visual cues; pt's sustained attention and working memory significantly improved in this activity as compared to previous trials in previous sessions; reported fatigue in R hand and took rest breaks as needed   Cognition   Overall Cognitive Status Impaired   Arousal/Participation Alert;Cooperative   Attention Attends with cues to redirect   Orientation Level Oriented to person;Oriented to place;Oriented to time   Memory Decreased short term memory;Decreased recall of precautions   Following Commands Follows one step commands with increased time or repetition   Comments pt with frequent and accurate verbalizations during session, responding to questions written on whiteboard from OT and forming sentences   Assessment   Treatment Assessment Pt agreeable to OT session this PM. Received sitting upright in w/c. Pt completed ROM/strength, fine motor, cognitive, and activity tolerance activities; details  in respective sections. Pt's sustained attention, working memory, problem solving, and generalized endurance have improved during completed tasks, requiring occasional rest breaks 2* generalized fatigue. Pt able to verbalize accurate and frequent sentences and phrases during session as well. Pt's wife present during session and provided excellent encouragement. Returned to supine at end of session. Pt continues to make positive gains towards goals. Pt's barriers to d/c include decreased strength throughout, decreased balance, impaired hearing, impaired cognition including safety awareness, problem solving, attention, comprehension, and expression, and decreased activity tolerance; all affect independence in self care and fxl transfers. Pt would benefit from continued skilled OT services in order to address listed barriers and prepare for safe d/c.   Prognosis Fair   Problem List Decreased strength;Decreased endurance;Impaired balance;Decreased cognition;Impaired judgement;Decreased safety awareness;Impaired hearing;Decreased range of motion;Decreased coordination   Plan   Treatment/Interventions ADL retraining;Functional transfer training;LE strengthening/ROM;Therapeutic exercise;Endurance training;Cognitive reorientation;Patient/family training;Equipment eval/education;Compensatory technique education;OT   Progress Progressing toward goals   OT Therapy Minutes   OT Time In 1338   OT Time Out 1431   OT Total Time (minutes) 53   OT Mode of treatment - Individual (minutes) 53

## 2024-03-06 NOTE — NURSING NOTE
Neuro consult completed with family present. New order in computer for follow up CT scan on 3/20 after discharge.

## 2024-03-06 NOTE — NURSING NOTE
Patient to go to [facility] on 3/11 with  at 9:20 am for appt at 10:30 am by [facility]. Family to be made aware.

## 2024-03-06 NOTE — PROGRESS NOTES
03/06/24 1145   Pain Assessment   Pain Assessment Tool 0-10   Pain Score No Pain   Restrictions/Precautions   Precautions Aphasia;Aspiration;Bed/chair alarms;Cognitive;Fall Risk;Finley;Hard of hearing   Weight Bearing Restrictions No   ROM Restrictions No   Eating   Type of Assistance Needed Supervision   Physical Assistance Level No physical assistance   Eating CARE Score 4   Eating Assessment   Food To Mouth Yes   Noted Coughing   Positioning Upright;Out of Bed   Safety Needs Increase Time;Cues;Freq Swallow   Meal Assessed Lunch   Intake Mode PO   Finishes Timely No   Opens Packages No   Lying to Sitting on Side of Bed   Type of Assistance Needed Physical assistance   Physical Assistance Level 25% or less   Lying to Sitting on Side of Bed CARE Score 3   Sit to Stand   Type of Assistance Needed Physical assistance   Physical Assistance Level 25% or less   Sit to Stand CARE Score 3   Bed-Chair Transfer   Type of Assistance Needed Physical assistance   Physical Assistance Level 25% or less   Comment visual cues   Chair/Bed-to-Chair Transfer CARE Score 3   Transfer Bed/Chair/Wheelchair   Limitations Noted In Balance;Endurance;Problem Solving;Sequencing;LE Strength   Adaptive Equipment Roller Walker   Toileting Hygiene   Type of Assistance Needed Physical assistance   Physical Assistance Level 76% or more   Comment able to assist in adjusting clothing once pulled up by OT   Toileting Hygiene CARE Score 2   Toileting   Able to Pull Clothing down no, up no.   Manage Hygiene Bowel  (incontinent)   Limitations Noted In Balance;Problem Solving;ROM;Safety;Sequencing;UE Strength;LE Strength   Adaptive Equipment Grab Bar   Toilet Transfer   Type of Assistance Needed Physical assistance   Physical Assistance Level 25% or less   Comment physical and visual cues for safety   Toilet Transfer CARE Score 3   Toilet Transfer   Surface Assessed Raised Toilet   Transfer Technique Standard   Limitations Noted In  Balance;Endurance;Problem Solving;ROM;Safety;LE Strength;Sequencing   Adaptive Equipment Grab Bar;Walker   Cognition   Overall Cognitive Status Impaired   Arousal/Participation Alert;Cooperative   Attention Attends with cues to redirect   Orientation Level Oriented to person;Oriented to place;Oriented to time   Memory Decreased short term memory;Decreased recall of precautions   Following Commands Follows one step commands with increased time or repetition   Assessment   Treatment Assessment Pt agreeable to OT session this AM. Received lying supine in bed. Pt completed fxl mobility to bathroom with RW and CG; visual cues required for safety. Toileting completed with maxA; transfer Brayan. Pt required supervision for lunch meal tray completion, requiring visual and physical cues for safety and to initiate double-swallow when coughing. Pt self-fed entirety of desired foods on tray with increased time and occasional encouragement to keep eating; continues to reach for provalve cup without cueing. Pt's wife present during session and provided good encouragement and support. Pt's wife and OT discussed pt's progression regarding ADL tasks and tub-shower transfer, which was practiced yesterday; pt and OT to continue practicing tub-shower transfer and report back to wife. Pt's barriers to d/c include decreased strength throughout, decreased balance, impaired hearing, impaired cognition including safety awareness, problem solving, attention, comprehension, and expression, and decreased activity tolerance; all affect independence in self care and fxl transfers. Pt would benefit from continued skilled OT services in order to address listed barriers and prepare for safe d/c.   Prognosis Fair   Problem List Decreased strength;Decreased endurance;Impaired balance;Decreased cognition;Impaired judgement;Decreased safety awareness;Impaired hearing;Decreased range of motion;Decreased coordination   Plan   Treatment/Interventions ADL  retraining;Functional transfer training;LE strengthening/ROM;Therapeutic exercise;Endurance training;Cognitive reorientation;Patient/family training;Equipment eval/education;Compensatory technique education;OT   Progress Progressing toward goals   OT Therapy Minutes   OT Time In 1145   OT Time Out 1228   OT Total Time (minutes) 43   OT Mode of treatment - Individual (minutes) 43

## 2024-03-06 NOTE — PLAN OF CARE
Problem: PAIN - ADULT  Goal: Verbalizes/displays adequate comfort level or baseline comfort level  Description: Interventions:  - Encourage patient to monitor pain and request assistance  - Assess pain using appropriate pain scale  - Administer analgesics based on type and severity of pain and evaluate response  - Implement non-pharmacological measures as appropriate and evaluate response  - Consider cultural and social influences on pain and pain management  - Notify physician/advanced practitioner if interventions unsuccessful or patient reports new pain  Outcome: Progressing     Problem: INFECTION - ADULT  Goal: Absence or prevention of progression during hospitalization  Description: INTERVENTIONS:  - Assess and monitor for signs and symptoms of infection  - Monitor lab/diagnostic results  - Monitor all insertion sites, i.e. indwelling lines, tubes, and drains  - Monitor endotracheal if appropriate and nasal secretions for changes in amount and color  - Lee appropriate cooling/warming therapies per order  - Administer medications as ordered  - Instruct and encourage patient and family to use good hand hygiene technique  - Identify and instruct in appropriate isolation precautions for identified infection/condition  Outcome: Progressing  Goal: Absence of fever/infection during neutropenic period  Description: INTERVENTIONS:  - Monitor WBC    Outcome: Progressing     Problem: Nutrition/Hydration-ADULT  Goal: Nutrient/Hydration intake appropriate for improving, restoring or maintaining nutritional needs  Description: Monitor and assess patient's nutrition/hydration status for malnutrition. Collaborate with interdisciplinary team and initiate plan and interventions as ordered.  Monitor patient's weight and dietary intake as ordered or per policy. Utilize nutrition screening tool and intervene as necessary. Determine patient's food preferences and provide high-protein, high-caloric foods as appropriate.      INTERVENTIONS:  - Monitor oral intake, urinary output, labs, and treatment plans  - Assess nutrition and hydration status and recommend course of action  - Evaluate amount of meals eaten  - Assist patient with eating if necessary   - Allow adequate time for meals  - Recommend/ encourage appropriate diets, oral nutritional supplements, and vitamin/mineral supplements  - Order, calculate, and assess calorie counts as needed  - Recommend, monitor, and adjust tube feedings and TPN/PPN based on assessed needs  - Assess need for intravenous fluids  - Provide specific nutrition/hydration education as appropriate  - Include patient/family/caregiver in decisions related to nutrition  Outcome: Progressing     Problem: Prexisting or High Potential for Compromised Skin Integrity  Goal: Skin integrity is maintained or improved  Description: INTERVENTIONS:  - Identify patients at risk for skin breakdown  - Assess and monitor skin integrity  - Assess and monitor nutrition and hydration status  - Monitor labs   - Assess for incontinence   - Turn and reposition patient  - Assist with mobility/ambulation  - Relieve pressure over bony prominences  - Avoid friction and shearing  - Provide appropriate hygiene as needed including keeping skin clean and dry  - Evaluate need for skin moisturizer/barrier cream  - Collaborate with interdisciplinary team   - Patient/family teaching  - Consider wound care consult   Outcome: Progressing

## 2024-03-06 NOTE — PROGRESS NOTES
Progress Note - Thomas Chase 83 y.o. male MRN: [id number]    Unit/Bed#: [facility] 213-02 Encounter: [id number]        Subjective:   Patient seen and examined at bedside after reviewing the chart and discussing the case with the caring staff.      Patient examined at bedside.  Patient reportedly is having some stool incontinence after receiving the stool softener.  Patient does have a virtual appointment with neurology at 1:30 PM.  Patient's blood pressure was low this morning.    Patient's labs from 3/4/2024 including CMP and CBC were within normal limits.    Physical Exam   Vitals: Blood pressure 90/60, pulse 100, temperature 98.1 °F (36.7 °C), temperature source Temporal, resp. rate 16, height 6' (1.829 m), weight 55.4 kg (122 lb 2.2 oz), SpO2 96%.,Body mass index is 16.56 kg/m².  Constitutional: Patient in no acute distress.  HEENT: PERR, EOMI, MMM.  Cardiovascular: Normal rate and regular rhythm.    Pulmonary/Chest: Effort normal and breath sounds normal.   Abdomen: Soft, + BS, NT.    Assessment/Plan:  Thomas Chase is a(n) 83 y.o. year old male with left SDH and right SAH.     1.  Cardiac with hx of HTN, HLD.  On pravastatin 40 mg daily (simvastatin nonformulary).  Continue to monitor BP.  2.  Allergic rhinitis.  Patient is on loratadine 10 mg daily.  3.  Depression/anxiety.  Patient is on Zoloft 25 mg daily.  4.  Insomnia.  Patient is on melatonin 6 mg at bedtime.  5.  Thrush.  Nystatin liquid swish and swallow 4 times daily.  6.  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  Patient is mainly nonverbal.  7.  Urinary retention.  Urinary retention protocol.  Started on Flomax 0.4 mg daily 2/21/24.  Urology consulted.  Finley catheter placed 2/22/24.  Finley removed on 2/28/24. Finley placed again 3/1.   8.  Dysphagia.  Speech therapy following.  Dysphagia 1 puureed with nectar thick liquid.  9.  Pain and bowel management.  As per physiatrist.  10.  Intracranial hemorrhage.   Patient will be receiving  intensive PT OT ST as per physiatrist.    Anticipated discharge date:  Friday 3/8/24  PCP:  RONY Diaz

## 2024-03-06 NOTE — PROGRESS NOTES
PM&R PROGRESS NOTE:  Thomas Chase 83 y.o. male MRN: [id number]  Unit/Bed#: -02 Encounter: [id number]        **CHANGE IN REHAB DIAGNOSIS FROM PRE-ADMISSION SCREEN  Rehab Diagnosis: Impairment of mobility, safety, Activities of Daily Living (ADLs), and cognitive/communication skills due to Brain Dysfunction:  02.22  Traumatic, Closed Injury  Etiologic Diagnosis: L SDH, R SAH, L anterior hypodensity lateral temporal lobe    HPI: Thomas Chase is a 83 y.o. 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  Unclear if patient had head trauma.  CT head showing a 3 mm left frontal subdural hemorrhage.  CT lumbar spine showing DJD.  Patient found to have dysphagia and was seen by speech therapy.  VFSS on 2/15/2024 cleared him for a puréed diet with thin liquids.  Patient was also started by psychiatry for depression and started on Zoloft 25 mg daily.  Patient sustained a rapid response and a unwitnessed fall with head trauma notable bump on head on 2/15/2024.  CT head showing a new small volume acute subarachnoid hemorrhage in the left frontal opercular region as well as new nondisplaced fracture of the zygomatic arch with soft tissue contusion, stable 3 mm subdural hemorrhage seen previously.  Patient transferred to [facility] for further evaluation.  Patient seen by neurology and neurosurgery.  CTA head and neck showing a left anterior hypodensity in the left anterior lateral temporal lobe with 2 punctate hyperdensities.  These represented possible nonhemorrhagic contusions or venous infarcts.  Less likely to represent ischemia or neoplasm.  Follow-up imaging recommended with CT head in 2-3 weeks.  CT  venogram showing patent dural venous sinuses.  Patient was cleared for DVT prophylaxis and started on Keppra for seizure prophylaxis.    Medical course complicated by suspected UTI based on UA and symptoms.  Patient was treated with antibiotics x 3 days.  After medical stabilization, patient found to have acute functional deficits from his baseline in mobility, self-care, speech swallow cognition therefore admitted to [facility] for acute inpatient rehabilitation.    SUBJECTIVE: Patient seen briefly this morning.  Per nursing patient remains constipated.  Received several medications yesterday and may need suppository today.  Once patient has BM, then can remove Finley catheter - hopeful for tomorrow morning.      NSGY appt at 1PM today      ASSESSMENT: Stable, progressing      PLAN:    Rehabilitation  Functional deficits: Aphasia, dysphagia, impaired cognition, impaired balance, impulsivity, poor safety awareness impaired mobility, self care    Continue current rehabilitation plan of care to maximize function.    I personally attended, reviewed, and discussed medical and functional updates in team conference today.  Please refer to advance care planning note for details.  Expected Discharge: Tuesday 3/12/24 with home care RN, PT, OT, SLP, Aide       Current Function:  Physical Therapy Occupational Therapy Speech Therapy   Weight Bearing Status: Weight Bearing as Tolerated  Transfers: Incidental Touching, Supervision (cg/close S)  Bed Mobility: Incidental Touching, Supervision (cg/close S)  Amulation Distance (ft): 180 feet  Ambulation: Incidental Touching, Supervision (cg/close S)  Assistive Device for Ambulation: Roller Walker  Wheelchair Mobility Distance: 133 ft  Wheelchair Mobility: Maximum Assistance (max visual and tactile cues)  Number of Stairs: 14  Assistive Device for Stairs: Bilateral Hand Rails  Stair Assistance:  (cg/close S)  Ramp: Incidental Touching  Assistive Device for Ramp: Roller  Walker  Discharge Recommendations: Home with:  DC Home with:: 24 Hour Assisteance, Family Support, First Floor Setup, Home Physical Therapy   Eating: Minimal Assistance  Grooming: Supervision  Bathing: Minimal Assistance  Bathing: Minimal Assistance  Upper Body Dressing: Minimal Assistance  Lower Body Dressing: Maximum Assistance  Toileting: Maximum Assistance  Tub/Shower Transfer:  (TBA)  Toilet Transfer: Incidental Touching, Minimal Assistance  Cognition: Exceptions to WNL  Cognition: Decreased Memory, Decreased Executive Functions, Decreased Attention, Decreased Comprehension  Orientation: Person, Place, Time   Mode of Communication: Non-verbal  Speech/Language: Expressive Aphasia  Cognition: Exceptions to WNL  Cognition: Decreased Memory, Decreased Attention, Decreased Executive Functions, Decreased Comprehension, Decreased Safety  Orientation: Person, Place, Time  Swallowing: Exceptions to WNL  Swallowing: Oral Dysphagia, Pharyngeal Dysphagia  Diet Recommendations: Level 1/Ambrosio Qiu Thick  Discharge Recommendations: Home with:  DC Home with:: 24 Hour Supervision, Home Speech Therapy, Family Support         DVT prophylaxis  Continue Lovenox      * Intracranial hemorrhage (HCC)  Assessment & Plan  Initial presentation on 2/13/2024 with increased weakness in the legs, mental status changes for several days  CT head showing a 3 mm left frontal subdural hemorrhage  Rapid response 2/15/2024 with fall and head trauma  CT head showing a new small volume acute subarachnoid hemorrhage in the left frontal opercular region and new nondisplaced fracture of the zygomatic arch with soft tissue contusion.    Transferred to [facility]  CTA head and neck and and repeat CT head showing a left anterior hypodensity in the left anterior lateral temporal lobe with 2 punctate hyperdensities.  Differential diagnosis nonhemorrhagic contusions or venous infarcts.  Patient seen by neurology and neurosurgery  Conservative management  Cleared  for DVT prophylaxis  Started on Keppra for seizure prophylaxis  Repeat CT head scheduled on Monday, 3/4/2024 -personally reviewed  Radiology read as follows:  Improving left anterior temporal lobe contusions with resolving edema.   Stable subacute subdural hemorrhage along the anterior left frontal convexity. Resolved subarachnoid and intraventricular hemorrhages. Unchanged minimal hyperdensity beneath the left craniotomy flap. Stable right parafalcine subdural hygroma. There is no new intra or extra-axial hemorrhage.    Follow-up with neurosurgery on 3/6/2024 virtually  Follow-up with neurosurgery and neurology in outpatient clinic.    Behavior safety risk  Assessment & Plan  Patient demonstrating mild impulsivity in acute care  Fall x 2 in acute care  Falls at home in the past    Safety behavior plan going well and ARC.  No further impulsivity  Nursing and staff to provide close supervision near nursing station  Patient placed on 4 side rails (not as a restraint, patient and wife preference)  Monitor sleep-wake cycle  Avoid overstimulation: 1 visitor at a time, low lights, low sounds      Hematuria  Assessment & Plan  Started 3/2 - 3/3/2024  Minor, and more likely related to irritation from Finley catheter  Irrigation as needed per urology  Asymptomatic  H&H stable    Headache  Assessment & Plan  Resolved  Continue Tylenol 650 mg PRN     Severe protein-calorie malnutrition (HCC)  Assessment & Plan  BMI 17  Nutrition following  Optimize oral intake  Supplements ordered    Dysphagia  Assessment & Plan  VFSS completed 2/15/2024  Cleared for a puréed diet with nectar thick liquids  SLP to follow while at [facility]  Repeat VFSS this week - order placed     Urinary retention  Assessment & Plan  Finley catheter removed on [facility]  Trial of void underway  Mixed pattern  Unfortunately required multiple straight catheterizations  Finley catheter replaced 3/1/24 per urology  Flomax increased to 0.8 mg every afternoon per urology  Trial  of void in 5-7 days again.  Awaiting good BM, then likely removing on Thursday for TOV.   Follow-up with urology as an outpatient      Constipation due to neurogenic bowel  Assessment & Plan  Patient started on more aggressive bowel program yesterday  May require suppository today    Hearing loss  Assessment & Plan  Chronic hearing loss  Patient status post cochlear implant  MRI contraindicated    Hypercholesterolemia  Assessment & Plan  Continue simvastatin 20 mg daily (home dose)    UTI (urinary tract infection)-resolved as of 2/21/2024  Assessment & Plan  Suspected UTI based on UA and symptoms in acute care  Completed 3 days IV antibiotics        Appreciate IM consultants medical co-management.  Labs, medications, and imaging personally reviewed.      ROS:  A ten point review of systems was completed on 03/06/24 and pertinent positives are listed in subjective section. All other systems reviewed were negative.       OBJECTIVE:   BP 90/60 (BP Location: Right arm)   Pulse 100   Temp 98.1 °F (36.7 °C) (Temporal)   Resp 16   Ht 6' (1.829 m)   Wt 55.4 kg (122 lb 2.2 oz)   SpO2 96%   BMI 16.56 kg/m²     Physical Exam  Vitals and nursing note reviewed.   Constitutional:       General: He is not in acute distress.  HENT:      Head: Normocephalic and atraumatic.      Ears:      Comments: Impaired hearing     Nose: Nose normal.      Mouth/Throat:      Mouth: Mucous membranes are moist.   Eyes:      Conjunctiva/sclera: Conjunctivae normal.   Cardiovascular:      Rate and Rhythm: Normal rate and regular rhythm.      Pulses: Normal pulses.   Pulmonary:      Effort: Pulmonary effort is normal.      Breath sounds: Normal breath sounds. No wheezing or rales.   Abdominal:      General: Bowel sounds are normal. There is no distension.      Palpations: Abdomen is soft.      Tenderness: There is no abdominal tenderness.   Genitourinary:     Comments: +Finley catheter  Musculoskeletal:         General: No swelling.       Cervical back: Neck supple.   Skin:     General: Skin is warm.   Neurological:      Mental Status: He is alert and oriented to person, place, and time.      Comments: Aphasia  Dysphagia  Generalized weakness  Balance impaired   Psychiatric:         Mood and Affect: Mood normal.          Lab Results   Component Value Date    WBC 5.31 03/04/2024    HGB 12.5 03/04/2024    HCT 38.5 03/04/2024    MCV 98 03/04/2024     (H) 03/04/2024     Lab Results   Component Value Date    SODIUM 140 03/04/2024    K 4.3 03/04/2024     03/04/2024    CO2 28 03/04/2024    BUN 21 03/04/2024    CREATININE 0.58 (L) 03/04/2024    GLUC 86 03/04/2024    CALCIUM 8.8 03/04/2024     Lab Results   Component Value Date    INR 1.02 06/02/2022    INR 1.10 05/29/2022    INR 1.04 05/28/2022    PROTIME 13.0 06/02/2022    PROTIME 13.7 05/29/2022    PROTIME 13.2 05/28/2022           Current Facility-Administered Medications:     acetaminophen (TYLENOL) tablet 650 mg, 650 mg, Oral, Q6H PRN, Daniel Clayton MD, 650 mg at 03/03/24 2140    enoxaparin (LOVENOX) subcutaneous injection 40 mg, 40 mg, Subcutaneous, Q24H JEANNIE, Alexandrea Lugo MD, 40 mg at 03/06/24 0816    lactulose (CHRONULAC) oral solution 20 g, 20 g, Oral, BID PRN, Darwin Paz MD, 20 g at 03/05/24 2107    loratadine (CLARITIN) tablet 10 mg, 10 mg, Oral, Daily, Amada L Dagnall, PA-C, 10 mg at 03/06/24 0816    meclizine (ANTIVERT) tablet 25 mg, 25 mg, Oral, Q8H PRN, Amada L Dagnall, PA-C    melatonin tablet 6 mg, 6 mg, Oral, HS, Amada L Dagnall, PA-C, 6 mg at 03/05/24 2107    multivitamin-minerals (CENTRUM) tablet 1 tablet, 1 tablet, Oral, Daily, Amada L Dagnall, PA-C, 1 tablet at 03/06/24 0816    nystatin (MYCOSTATIN) oral suspension 500,000 Units, 500,000 Units, Swish & Swallow, 4x Daily, Darwin Paz MD, 500,000 Units at 03/06/24 0816    polyethylene glycol (MIRALAX) packet 17 g, 17 g, Oral, Daily PRN, Ashley Depadua, MD, 17 g at 03/05/24 1740    pravastatin (PRAVACHOL)  tablet 40 mg, 40 mg, Oral, Daily With Dinner, Amada LEE CHRISTIAN Epperson-LAUREN, 40 mg at 03/05/24 1740    sertraline (ZOLOFT) tablet 25 mg, 25 mg, Oral, Daily, Amada LEE CHRISTIAN Epperson-C, 25 mg at 03/06/24 0816    tamsulosin (FLOMAX) capsule 0.8 mg, 0.8 mg, Oral, Daily With Dinner, Norberto Boyer MD, 0.8 mg at 03/05/24 1740    Past Medical History:   Diagnosis Date    Arthritis     Encounter for general adult medical examination without abnormal findings 03/20/2019    Fall 11/03/2022    Hyperlipidemia     Macular degeneration, 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[facility])     Urinary retention 06/02/2022       Patient Active Problem List    Diagnosis Date Noted    Intracranial hemorrhage ([facility]) 02/16/2024    Behavior safety risk 02/20/2024    Hematuria 03/04/2024    Headache 02/20/2024    Bilateral lower extremity weakness 02/17/2024    Abnormal CT scan, lumbar spine 02/15/2024    Severe protein-calorie malnutrition ([facility]) 02/14/2024    Debility 02/13/2024    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    Dysphagia 11/21/2023    Macular degeneration, age related 02/27/2023    Traumatic subdural hemorrhage with loss of consciousness of unspecified duration, initial encounter ([facility]) 02/27/2023    Sensorineural hearing loss (SNHL), bilateral 08/18/2022    Balance disorder 06/28/2022    Abnormal echocardiogram 06/27/2022    Right bundle-branch block 06/27/2022    Moderate protein-calorie malnutrition (HCC) 06/19/2022    Hypotension 06/19/2022    Diastolic dysfunction 06/19/2022    EKG abnormalities 06/19/2022    Constipation due to neurogenic bowel 06/02/2022    Urinary retention 06/02/2022    SDH (subdural hematoma) ([facility]) 05/27/2022    Primary osteoarthritis of left knee 05/17/2022    Hygroma 04/26/2022    Right hand pain     Carpal tunnel syndrome on right     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      ** Please Note:  voice to text software may have been used in the creation of this document. Although proof errors in transcription or interpretation are a potential of such software**

## 2024-03-06 NOTE — ASSESSMENT & PLAN NOTE
Seen today for 2-week hospital follow-up with repeat CT head    Prior 3mm left SDH, L frontal and right high frontal SAH, s/p fall in 2/2024  H/o L craniotomy for SDH evacuation 5/2022 by MEIR, L MMA embolization by MO  No AC/AP medications  Currently at baseline mental status, no new deficits  Prior imaging with enlarging hypodensity within the left anterior lateral temporal lobe, unclear etiology.    Imaging:  CT head wo 3/4/24:Improving left anterior temporal lobe contusions with resolving edema.Stable subacute subdural hemorrhage along the anterior left frontal convexity. Resolved subarachnoid and intraventricular hemorrhages. Unchanged minimal hyperdensity beneath the left craniotomy flap. Stable right parafalcine subdural hygroma. There is no new intra or extra-axial hemorrhage.    Plan:  Continue frequent neurological checks.   Reviewed imaging with patient and family members.   STAT CT head with any neurological decline including drop GCS of 2pts within 1 hr.  Recommend SBP <160mmHg.  Hold all therapeutic antiplatelet and anticoagulation medications.   Medical management and pain control per primary team  Mobilize with PT/OT  DVT PPX: SCDs and lovenox   No neurosurgical intervention indicated at this juncture.   Patient requires neurology follow-up  Patient unable to obtain MRI secondary to cochlear implant.    Neurosurgery will sign off, patient will follow-up in 2 weeks with repeat CT head, call with any further question concerns.

## 2024-03-06 NOTE — TEAM CONFERENCE
Acute RehabilitationTeam Conference Note  Date: 3/6/2024   Time: 10:49 AM       Patient Name:  Thomas Chase       Medical Record Number: [id number]   YOB: 1940  Sex: Male          Room/Bed:  [facility] 213/[facility] 213-02  Payor Info:  Payor: MEDICARE / Plan: MEDICARE A AND B / Product Type: Medicare A & B Fee for Service /      Admitting Diagnosis: Cl fracture base skull wth intracran hemorrhage, no loss consciousness (HCC) [S02.109A, S06.300A]   Admit Date/Time:  2/20/2024  3:13 PM  Admission Comments: No comment available     Primary Diagnosis:  Intracranial hemorrhage (HCC)  Principal Problem: Intracranial hemorrhage (HCC)    Patient Active Problem List    Diagnosis Date Noted    Hematuria 03/04/2024    Headache 02/20/2024    Behavior safety risk 02/20/2024    Bilateral lower extremity weakness 02/17/2024    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    Abnormal CT scan, lumbar spine 02/15/2024    Severe protein-calorie malnutrition (HCC) 02/14/2024    Debility 02/13/2024    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) 02/27/2023    Sensorineural hearing loss (SNHL), bilateral 08/18/2022    Balance disorder 06/28/2022    Abnormal echocardiogram 06/27/2022    Right bundle-branch block 06/27/2022    Moderate protein-calorie malnutrition (HCC) 06/19/2022    Hypotension 06/19/2022    Diastolic dysfunction 06/19/2022    EKG abnormalities 06/19/2022    Constipation 06/02/2022    Urinary retention 06/02/2022    SDH (subdural hematoma) ([facility]) 05/27/2022    Primary osteoarthritis of left knee 05/17/2022    Hygroma 04/26/2022    Right hand pain     Carpal tunnel syndrome on right     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    Overweight (BMI 25.0-29.9) 01/27/2021    Negative depression screening 09/26/2019    Hypercholesterolemia 07/12/2018     Borderline hypertension 07/12/2018    Hearing loss 04/08/2009       Physical Therapy:    Weight Bearing Status: Weight Bearing as Tolerated  Transfers: Incidental Touching, Supervision (cg/close S)  Bed Mobility: Incidental Touching, Supervision (cg/close S)  Amulation Distance (ft): 180 feet  Ambulation: Incidental Touching, Supervision (cg/close S)  Assistive Device for Ambulation: Roller Walker  Wheelchair Mobility Distance: 133 ft  Wheelchair Mobility: Maximum Assistance (max visual and tactile cues)  Number of Stairs: 14  Assistive Device for Stairs: Bilateral Hand Rails  Stair Assistance:  (cg/close S)  Ramp: Incidental Touching  Assistive Device for Ramp: Roller Walker  Discharge Recommendations: Home with:  DC Home with:: 24 Hour Assisteance, Family Support, First Floor Setup, Home Physical Therapy     02.21/2024:   Patient seen today for IE.  Presents, following hospitalizaton  for  traumatic brain dysfunction (L SDH, R SAH, L anterior hypodensity lateral temporal lobe) and PMH significant for previous fall with TBI (SDH with L craniotomy), chronic aphasia, hard of hearing with cochlear implants in place, and hyperlipidemia.    Patient MIN A bed mobility, MOD A transfers with walker, MAX A wheelchair mobility level and unlevel surfaces up to 133' with increased visual and tactile cues, min-mod A ambulation up to 66' level and unlevel surfaces with walker and wheelchair follow.  Patient limited by decreased ROM/strength, decreased balance and safety, decreased endurance, impaired  cognition, and impaired hearing (patient wears cochlear implants).   May benefit from continued inpatient ARC PT to increase function, safety, and increased independence in prep for safe d/c to home with family and continued PT services as needed.    02/28/2024:   Patient participating in therapy and making positive gains.  Patient CG/S bed mobility, CG transfers with walker, CG ambulation up to 167' level and unlevel surfaces with  walker, cg negotiation of 7 steps with 2 handrails.   Patient requires increased visual and tactile cues for general safety, Finley management, and task sequence.  Patient also remains limited by decreased ROM/strength, decreased balance and safety, decreased endurance, impaired cognition, aphasia, and impaired hearing.  Patient may benefit from continued inpatient ARC PT to increase function, safety, and increased independence in prep for safe d/c to home with family and continued PT services.    03/06/2024:   Patient participating in therapy and continuing to make positive gains.   Patient CG/S bed mobility, CG/close S transfers with walker, CG/close S ambulation up to 180' level and unlevel surfaces with walker, MIN A ambulation outside on uneven pavement/sidewalks, CG/close S negotiation of steps with 2 handrails, CG ramp with walker.  Patient remains limited by decreased ROM/strength, decreased balance and safety, decreased endurance, impaired cognition, aphasia, and impaired hearing.   Patient may benefit from continued inpatient ARC PT to increase function, safety, and increased independence in prep for safe d/c to home with family and continued PT services as needed.    Occupational Therapy:  Eating: Minimal Assistance  Grooming: Supervision  Bathing: Minimal Assistance  Bathing: Minimal Assistance  Upper Body Dressing: Minimal Assistance  Lower Body Dressing: Maximum Assistance  Toileting: Maximum Assistance  Tub/Shower Transfer:  (TBA)  Toilet Transfer: Incidental Touching, Minimal Assistance  Cognition: Exceptions to WNL  Cognition: Decreased Memory, Decreased Executive Functions, Decreased Attention, Decreased Comprehension  Orientation: Person, Place, Time  Discharge Recommendations: Home with:  DC Home with:: 24 Hour Supervision, Family Support, First Floor Setup, Home Occupational Therapy       2/21/24: Pt new admit to ARU. Current LOF listed above. Barriers to d/c include decreased strength  throughout, decreased balance, impaired cognition including safety awareness, problem solving, attention, comprehension, expression, and short term memory, and decreased activity tolerance; all affect independence in self care and fxl transfers. Pt will participate in ADL training, therapeutic exercises and activities, fxl mobility/transfers, cognitive training, and activity tolerance in order to progress towards goals. Pt would benefit from continued skilled OT services in order to address listed barriers and prepare for safe d/c.     2/28/24: Pt's current LOF listed above. Continues with barriers to d/c of decreased strength throughout, decreased balance, impaired cognition including safety awareness, problem solving, attention, comprehension, expression, and short term memory, and decreased activity tolerance; all affect independence in self care and fxl transfers. Pt participating in ADL training, therapeutic exercises and activities, fxl mobility/transfers, cognitive training, and activity tolerance in order to progress towards goals. Pt would benefit from continued skilled OT services in order to address listed barriers and prepare for safe d/c.      3/6/24: Pt's current LOF listed above. Continues with barriers to d/c of decreased strength throughout, decreased balance, impaired cognition including safety awareness, problem solving, attention, comprehension, expression, and short term memory, and decreased activity tolerance; all affect independence in self care and fxl transfers. Pt participating in ADL training, therapeutic exercises and activities, fxl mobility/transfers, cognitive training, and activity tolerance in order to progress towards goals. Pt would benefit from continued skilled OT services in order to address listed barriers and prepare for safe d/c.     Speech Therapy:  Mode of Communication: Non-verbal  Speech/Language: Expressive Aphasia  Cognition: Exceptions to WNL  Cognition: Decreased  Memory, Decreased Attention, Decreased Executive Functions, Decreased Comprehension, Decreased Safety  Orientation: Person, Place, Time  Swallowing: Exceptions to WNL  Swallowing: Oral Dysphagia, Pharyngeal Dysphagia  Diet Recommendations: Level 1/Puree, [place] Thick  Discharge Recommendations: Home with:  DC Home with:: 24 Hour Supervision, Home Speech Therapy, Family Support  2/21 per nursing patient coughing with thin liquids. Diet downgraded to nectar thick liquids, consult pending this date.     2/28     Cognitive Linguisitic Assessments   Cognitive Linquistic Quick Test (CLQT) BCAT 8/21- pt only able to participate via white board 2' hearing loss     Comprehension   Assist Devices Other  (cochlear implants)   Auditory    (white board for communication)   QI: Comprehension 2. Sometimes Understands: Understands only basic conversations or simple, direct phrases. Frequently requires cues to understand   Comprehension (FIM) 2 - Understands only simple expressions or gestures (waves, hello)   Expression   Non-Verbal Basic   QI: Expression 2. Frequently exhibits difficulty with expressing needs and ideas   Expression (FIM) 2 - Uses only simple expressions or gestures (waves, hello)   Social Interaction   Social Interaction (FIM) 2 - Interacts appropriately 25-49% of time   Problem Solving   Problem solving (FIM) 2 - Solves basic problems 25-49% of time   Memory   Memory (FIM) 2 - Recognizes, recalls/performs 25-49%   Memory Skills   Orientation Level Oriented to person   Consistencies Assessed and Performance   Materials Admnistered Puree/Level 1;Nectar thick liquid   Oral Stage Mild impaired   Phargngeal Stage Moderate impaired   Swallow Mechanics Mild delayed;Swallow initation;Weak larygneal rise;Aspiration risk   Strategies and Efficacy small bites, small sips, slow rate   Recommendations   Risk for Aspiration Present   Recommendations Consider oral diet   Diet Solid Recommendation Level 1 Dysphagia/pureed   Diet  Liquid Recommendation Nectar thick liquid   Recommended Form of Meds Whole;With thick liquid   General Precautions Aspiration precautions;Feed only when alert;Minimize distractions;Upright as possible for all oral intake;Remain upright for 45 mins after meals   Compensatory Swallowing Strategies Alternate solids and liquids   Results Reviewed with PT/Family/Caregiver     Eating   Type of Assistance Needed Supervision  (visual cues)   Physical Assistance Level No physical assistance   Eating CARE Score 4       3/6 Continue puree and nectar thick liquids via 10CC cup and water via 5CC. Continue to abide by strict aspiration precautions. Plan to repeat VBS prior to discharge. Last VBS was 2/14/24.     Nursing Notes:  Appetite: Fair  Diet Type: Dysphagia I, Nectar thick liquids                      Diet Patient/Family Education Complete: Yes    Type of Wound (LDA):  (protection with allevyn to heels and sacrum, multiple scabs)                    Type of Wound Patient/Family Education: No  Bladder: 1 - Total Assistance     Bladder Patient/Family Education: Yes  Bowel: 1 - Total Assistance     Bowel Patient/Family Education: Yes  Pain Location/Orientation: Orientation: Right, Location: Foot  Pain Score: 0                       Hospital Pain Intervention(s): Repositioned  Pain Patient/Family Education: No  Medication Management/Safety  Injectable: Lovenox  Safe Administration: Yes  Medication Patient/Family Education Complete: Yes    2/27/2024  Pt. Admit after ICH/falls. Pt. Is deaf. Communicates via white board and handouts. Does occasionally say one or two words to questions asked, answers are appropriate. Requires close supervision  when eating and requires special cups to drink with- on nectar thick fluids.  Follows commands. Pills crushed and in applesauce. Has failed urinary retention protocol and has a brooks catheter in place now. Has had small amount of bleeding at meatus and in bag. Reported to earlier shift that  tip of penis was irritated. Has not reported this to myself. Mediplex to sacrum and heels as prevention.   3/4/24 Pt On nectar thick  fluids uses special cups. Supervision for all meals wife is cleared by speech to sit with PT during meals. Finley back in place failed voiding trial.     Case Management:     Discharge Planning  Living Arrangements: Lives w/ Spouse/significant other  Support Systems: Spouse/significant other, Son, Daughter  Assistance Needed: unknown  Type of Current Residence: Private residence  Current Home Care Services: No  Initial assessment & orientation to ARC with Pt & his wife, who was visiting bedside. Pt had difficulty hearing this worker, but his wife expressed that she is in agreement with tentative team recommendations. Tx team meeting was held today & tentative target DC date is 3/8, with home health services (all services). Discussed role of team members & reviewed TX & DC planning with Pt & Pt's spouse, who expressed understanding & agreement. Pt's additional family also visiting today & also present when physician assessed Pt.  SW will continue to monitor & assist as needed with TX & DC planning.     2/28/24 - Tx team recommendations reviewed with patient & family, who expressed understanding & agreement. Target DC date is 3/8 with [facility] (all); a list of providers was provided to Pt & a referral will be made based on Pt preference (expressed interest in [facility] & [facility]). Pt's spouse inquiring if there may be an extension; discussed that this will be reviewed at the team meeting next week. Provided information regarding meal options. SW will continue to monitor & assist as needed with Tx & DC planning.    Is the patient actively participating in therapies? yes  List any modifications to the treatment plan: na    Barriers Interventions   Constipation, urinary retention, neurogenic bladder, left frontal hemorrhage, malnourished Medical management and oversight, medication management, CT  scan completed- follow-up with neurosx, nutrition following, will attempt brooks removal and attempt at voiding    Decreased hearing Cues, tactile cues, ADL, transfer, gait training   Decreased cog, dysphagia ST strategies, ADL/transfer/gait training, puree with nectar thick liquids, strict aspiration precautions, complete video-swallow   Decreased end and balance Therapeutic exercise, therapeutic activity         Is the patient making expected progress toward goals? yes  List any update or changes to goals: na    Medical Goals: Patient will be able to manage medical conditions and comorbid conditions with medications and follow up upon completion of rehab program    Weekly Team Goals:   Rehab Team Goals  ADL Team Goal: Patient will require supervision with ADLs with least restrictive device upon completion of rehab program  Transfer Team Goal: Patient will require assist with transfers with least restrictive device upon completion of rehab program  Locomotion Team Goal: Patient will require assist with locomotion with least restrictive device upon completion of rehab program  Cognitive Team Goal: Patient will return to premorbid level of cognitive activity upon completion of rehab program    Mod I bed mobility, S transfers and mobility  Min assist bathing, dressing, and toileting   Tolerate LRAD without S/S aspiration    Health and wellness: to be able to return home and complete activities with family     Discussion: Plan for return home with spouse with family assist with [facility] for PT, OT, ST, nursing, and aides    Anticipated Discharge Date:  March 14, 2024

## 2024-03-06 NOTE — PROGRESS NOTES
03/06/24 0810   Pain Assessment   Pain Assessment Tool 0-10   Pain Score No Pain   Pain Rating: FLACC (Rest) - Face 0   Pain Rating: FLACC (Rest) - Legs 0   Pain Rating: FLACC (Rest) - Activity 0   Pain Rating: FLACC (Rest) - Cry 0   Pain Rating: FLACC (Rest) - Consolability 0   Score: FLACC (Rest) 0   Restrictions/Precautions   Precautions Aphasia;Aspiration;Bed/chair alarms;Fall Risk   Swallow Assessment   Swallow Treatment Assessment Assessed with breakfast tray without distraction and direct supervision to ensure strict use of compensatory strategies ( some continued carryover noted) including double swallow, small bites, slow rate and alternating bites and sips. Pureed solids, nectar thick liquids via 5CC and 10CC cup, thin liquids via 5CC cup. Trialed effortful swallows today x30. Cough variable across meal tray across all consistencies x5  as well as delayed cough following tray. Last VBS was 2/14/24, he may benefit from a repeat VBS prior to discharge due to known history of silent aspiration with continued s.s of aspiration across meal trays. This repeat would determine if diet able to be advanced any further. Continue same diet recs with direct supervision, patient able to feed himself independently   SLP Therapy Minutes   SLP Time In 0810   SLP Time Out 0900   SLP Total Time (minutes) 50   SLP Mode of treatment - Individual (minutes) 50   SLP Mode of treatment - Concurrent (minutes) 0   SLP Mode of treatment - Group (minutes) 0   SLP Mode of treatment - Co-treat (minutes) 0   SLP Mode of Treatment - Total time(minutes) 50 minutes   SLP Cumulative Minutes 600

## 2024-03-06 NOTE — NURSING NOTE
Patient ambulates assist x 1 with walker in room. More vocal throughout day with staff. Able to make needs known. Voices no pain. Coughing on all meals with food and fluids on occasion. Sitting out of bed in wheelchair for meals. Finley catheter in place draining dark yellow urine. Incontinant of smear of stool in brief. Suppository given after dinner as per order. Hard stool felt in rectum with insertion. Will continue to monitor and follow plan of care.

## 2024-03-06 NOTE — NURSING NOTE
Patient resting in bed at this time.  No signs of distress noted.  Bed alarm in place to promote patient safety.  Finley catheter draining tiana urine.  Patient bathed with assistance overnight.  Allevyn dressings CDI to the heels and sacrum.  Patient refused assistance with repositioning overnight.  Heels elevated off of bed on a pillow.  Call bell within reach.  Plan of care ongoing.

## 2024-03-06 NOTE — PROGRESS NOTES
"   03/06/24 0907   Pain Assessment   Pain Assessment Tool 0-10   Pain Score No Pain   Restrictions/Precautions   Precautions Aphasia;Aspiration;Bed/chair alarms;Fall Risk;Hard of hearing;Finley;Supervision on toilet/commode   Weight Bearing Restrictions No   ROM Restrictions No   Cognition   Overall Cognitive Status Impaired   Arousal/Participation Alert;Responsive   Attention Attends with cues to redirect   Orientation Level Oriented to person;Oriented to place   Memory Decreased short term memory;Decreased recall of precautions   Following Commands Follows one step commands inconsistently   Subjective   Subjective \"I have to use the bathroom.\"   Sit to Lying   Type of Assistance Needed Supervision   Comment No rail   Sit to Lying CARE Score 4   Sit to Stand   Type of Assistance Needed Incidental touching   Comment CGA to close S with tactile/visual cues   Sit to Stand CARE Score 4   Transfer Bed/Chair/Wheelchair   Limitations Noted In Balance;Endurance;LE Strength   Adaptive Equipment Roller Walker   Sit to Stand Contact Guard   Stand to Sit Contact Guard   Sit to Supine Supervision   Findings Pt continues to require tactile and visual cues for safe hand placement with transfers.   Walk 10 Feet   Type of Assistance Needed Incidental touching   Comment CGA to close S with tactile/visual cues   Walk 10 Feet CARE Score 4   Walk 50 Feet with Two Turns   Type of Assistance Needed Incidental touching   Comment CGA to close S with tactile/visual cues   Walk 50 Feet with Two Turns CARE Score 4   Walk 150 Feet   Type of Assistance Needed Incidental touching   Comment CGA to close S with tactile/visual cues   Walk 150 Feet CARE Score 4   Ambulation   Primary Mode of Locomotion Prior to Admission Walk   Distance Walked (feet) 165 ft  (170')   Assist Device Roller Walker   Gait Pattern Inconsistant Maria Victoria;Decreased foot clearance   Limitations Noted In Balance;Coordination;Device Management;Safety;Strength   Provided " Assistance with: Balance;Direction   Walk Assist Level Contact Guard;Close Supervision   Findings CGA to close S for gait with the RW.   Does the patient walk? 2. Yes   Curb or Single Stair   Style negotiated Single stair   Type of Assistance Needed Independent   Comment CGA   1 Step (Curb) CARE Score 6   4 Steps   Type of Assistance Needed Incidental touching   Comment CGA   4 Steps CARE Score 4   12 Steps   Type of Assistance Needed Incidental touching   Comment CGA   12 Steps CARE Score 4   Stairs   Type Stairs   # of Steps 14   Weight Bearing Precautions WBAT;Fall Risk   Assist Devices Bilateral Rail   Findings CGA to ascend/descend steps. Cues needed for safety.  Pt required tactile cues to place hands on rails of steps. Pt initially attempting to lift RW onto steps.  Intermittent assist needed to slide hands forward on rail when descending.   Toilet Transfer   Type of Assistance Needed Incidental touching   Comment CGA with RW   Toilet Transfer CARE Score 4   Toilet Transfer   Surface Assessed Raised Toilet   Limitations Noted In Balance;Problem Solving;Safety;LE Strength   Adaptive Equipment   (elevated bedside commode)   Positioning Concerns Raised Seat;Grab Bars;Cognition;Safety   Findings Pt has difficulty with safe positioning in front of toilet.  Slow to sidestep towards the R.  Manual cues required.   Therapeutic Interventions   Strengthening seated ex   Balance gait, transfers   Other steps   Assessment   Treatment Assessment Pt motivated to participate. Pt limited by hearing deficits. Pt does not appear to be in pain.  Requires frequent tactile and visual cues for safe hand placement with transfers. Pt requires assist to place hands on handrails of steps with ascending/descending. Pt reported that he needed to use the bathroom during session, but once seated on the toilet, no toileting occurred.  Pt will benefit from continued PT to progress gait, transfers, balance, strengthening, steps, and safety  in order to maximize function and decrease risk for falls.   Problem List Decreased strength;Decreased endurance;Impaired balance;Decreased mobility;Decreased cognition;Impaired judgement;Decreased safety awareness;Impaired hearing   PT Barriers   Physical Impairment Decreased strength;Decreased endurance;Impaired balance;Decreased mobility;Decreased cognition;Impaired judgement;Decreased safety awareness;Impaired hearing   Functional Limitation Car transfers;Ramp negotiation;Stair negotiation;Standing;Transfers;Walking   Plan   Treatment/Interventions Functional transfer training;LE strengthening/ROM;Elevations;Therapeutic exercise;Endurance training;Patient/family training;Bed mobility;Gait training   Progress Progressing toward goals   PT Therapy Minutes   PT Time In 0907   PT Time Out 1007   PT Total Time (minutes) 60   PT Mode of treatment - Individual (minutes) 60   PT Mode of treatment - Concurrent (minutes) 0   PT Mode of treatment - Group (minutes) 0   PT Mode of treatment - Co-treat (minutes) 0   PT Mode of Treatment - Total time(minutes) 60 minutes   PT Cumulative Minutes 1161   Therapy Time missed   Time missed? No     Seated ex: 2#  B LEs - frequent tactile commands needed to increase quality of movement.  B hip hikes 2 x 15  B LAQs 2 x 15  B hip abduction 2 x 15  B hamstring curls with red t-band 2 x 15  Pt unable to follow cues for APs.

## 2024-03-06 NOTE — TELEPHONE ENCOUNTER
Routing Dr. May's message to Dr. Darwin Paz and Dr. Alexandrea Lugo, providers at [facility].

## 2024-03-06 NOTE — TELEPHONE ENCOUNTER
----- Message from Chris May MD sent at 3/4/2024  8:37 PM EST -----  Buck Tapia,    I spoke to Thomas' family this evening and got some detail on his case.  He is someone who I would like to see in clinic, ideally about 2 weeks after he gets out of the [facility].    Aside from follow up I was hoping to arrange some additional lab work in the [facility] which we will need in the office as this will save him a trip for blood work after discharge.    In particular I was hoping they could check the following:  Vitamin D  SPEP  Sed Rate/CRP  FTA-ABS    I know that his B-12 was technically in the normal range but it was really on the low end.  I was hoping they would start him on supplementation... I know that he is not swallowing well so initially injection would be fine if they prefer.    Could you please help me reach out to the [facility] team and help arrange his follow up?    Thanks!!!    -Dr GONZALEZ

## 2024-03-06 NOTE — DISCHARGE INSTR - AVS FIRST PAGE
Rehabilitation Instructions:     Diet: Pureed Diet with Nectar thick liquids, except when using sippy cup can have thins.       ACTIVITY:   Please follow mobility, self care instructions as your were taught by inpatient rehabilitation therapists.    Please continue using wheelchair or rolling walker all the time while out of bed and adaptive equipment recommended.    You will continue your rehabilitation with home care RN, PT, OT, SLP, Aide.     *PATIENT RECOMMENDED TO HAVE 24/7 SUPERVISION AND ASSISTANCE IN TRANSITION PERIOD FROM [facility] TO HOME. TIME PERIOD COULD BE 2-3 WEEKS.       OTHER:   Please make appointment with PCP and Urology in April 2024.      Neurosurgery discharge instructions following traumatic head bleed:     Do not take any blood thinning medications (ie. No Advil. No motrin. No ibuprofen. No Aleve. No Aspirin. No fishoil. No heparin. No antiplatelet / no anticoagulation medication).  Refrain from activity that increases chance of trauma to head or falls. Recommend you take fall precaution.  No strenuous activity or sports.  Return to hospital Emergency Room if you experience worsening / new headache, nausea/vomiting, speech/vision change, seizure, confusion / mental status change, weakness, or other neurological changes.

## 2024-03-06 NOTE — CONSULTS
[facility]  Consult  Name: Thomas Chase 83 y.o. male I MRN: [id number]  Unit/Bed#: -02 I Date of Admission: 2/20/2024   Date of Service: 3/6/2024 I Hospital Day: 15    Inpatient consult to Neurosurgery  Consult performed by: RONY New  Consult ordered by: Alexandrea Lugo MD          Assessment/Plan   SDH (subdural hematoma) (HCC)  Assessment & Plan  Seen today for 2-week hospital follow-up with repeat CT head    Prior 3mm left SDH, L frontal and right high frontal SAH, s/p fall in 2/2024  H/o L craniotomy for SDH evacuation 5/2022 by MEIR, L MMA embolization by MO  No AC/AP medications  Currently at baseline mental status, no new deficits  Prior imaging with enlarging hypodensity within the left anterior lateral temporal lobe, unclear etiology.    Imaging:  CT head wo 3/4/24:Improving left anterior temporal lobe contusions with resolving edema.Stable subacute subdural hemorrhage along the anterior left frontal convexity. Resolved subarachnoid and intraventricular hemorrhages. Unchanged minimal hyperdensity beneath the left craniotomy flap. Stable right parafalcine subdural hygroma. There is no new intra or extra-axial hemorrhage.    Plan:  Continue frequent neurological checks.   Reviewed imaging with patient and family members.   STAT CT head with any neurological decline including drop GCS of 2pts within 1 hr.  Recommend SBP <160mmHg.  Hold all therapeutic antiplatelet and anticoagulation medications.   Medical management and pain control per primary team  Mobilize with PT/OT  DVT PPX: SCDs and lovenox   No neurosurgical intervention indicated at this juncture.   Patient requires neurology follow-up  Patient unable to obtain MRI secondary to cochlear implant.    Neurosurgery will sign off, patient will follow-up in 2 weeks with repeat CT head, call with any further question concerns.           Subjective/Objective     Chief Complaint: 2 week  f/u    Subjective: Patient is mostly nonverbal.  He is able to identify his wife and son who are in the room with him and that he is in [place].  He offers no complaints.  Per family he is able to ambulate with physical therapy with a walker.  Family is very concerned about his urinary retention requiring a Finley catheter.    Objective: Patient comfortably sitting out in chair, NAD.    I/O         03/04 0701 03/05 0700 03/05 0701 03/06 0700 03/06 0701 03/07 0700    P.O. 480 720 320    Total Intake(mL/kg) 480 (8.7) 720 (13) 320 (5.8)    Urine (mL/kg/hr) 500 (0.4) 400 (0.3)     Total Output 500 400     Net -20 +320 +320           Unmeasured Stool Occurrence   1 x            Invasive Devices       Drain  Duration             Urethral Catheter 18 Fr. 5 days                    Physical Exam:  Vitals: Blood pressure 90/60, pulse 100, temperature 98.1 °F (36.7 °C), temperature source Temporal, resp. rate 16, height 6' (1.829 m), weight 55.4 kg (122 lb 2.2 oz), SpO2 96%.,Body mass index is 16.56 kg/m².    Virtual visit exam:    He is able to identify his wife and son in the room and that he is in [place].  Nearly nonverbal with profound hearing loss.  Cranial nerves grossly intact Limited due to mental status but no gross abnormality seen on video.  Patient does not follow commands but mimics well.  He is able to lift his arms above his head with upper extremity right-sided weakness which is chronic.  Family states he is able to ambulate with a walker.      Lab Results:  Results from last 7 days   Lab Units 03/04/24  0544 02/29/24  0532   WBC Thousand/uL 5.31 6.81   HEMOGLOBIN g/dL 12.5 12.3   HEMATOCRIT % 38.5 39.5   PLATELETS Thousands/uL 450* 520*   NEUTROS PCT % 69 73   MONOS PCT % 9 8   EOS PCT % 2 1     Results from last 7 days   Lab Units 03/04/24  0544 02/29/24  0532   SODIUM mmol/L 140 141   POTASSIUM mmol/L 4.3 4.1   CHLORIDE mmol/L 104 103   CO2 mmol/L 28 29   BUN mg/dL 21 25   CREATININE mg/dL 0.58* 0.65  "  CALCIUM mg/dL 8.8 8.9   ALK PHOS U/L 111* 123*   ALT U/L 35 40   AST U/L 20 29                 No results found for: \"TROPONINT\"  ABG:No results found for: \"PHART\", \"XDH2ZPD\", \"PO2ART\", \"MVD0SYI\", \"D0IYVWQB\", \"BEART\", \"SOURCE\"    Imaging Studies: I have personally reviewed pertinent reports.   and I have personally reviewed pertinent films in PACS    No results found.    EKG, Pathology, and Other Studies: I have personally reviewed pertinent reports.      VTE Pharmacologic Prophylaxis: Enoxaparin (Lovenox)    VTE Mechanical Prophylaxis: sequential compression device   "

## 2024-03-06 NOTE — PROGRESS NOTES
03/06/24 0705   Pain Assessment   Pain Assessment Tool 0-10   Pain Score No Pain   Restrictions/Precautions   Precautions Aphasia;Aspiration;Bed/chair alarms;Fall Risk;Finley;Hard of hearing;Supervision on toilet/commode   Weight Bearing Restrictions No   ROM Restrictions No   Cognition   Overall Cognitive Status Impaired   Arousal/Participation Alert;Responsive   Attention Attends with cues to redirect   Orientation Level Oriented to person   Memory Decreased short term memory;Decreased recall of precautions   Following Commands Follows one step commands inconsistently   Subjective   Subjective Minimal verbalization.  Words often nonsensical.   Sit to Stand   Type of Assistance Needed Incidental touching   Comment CGA to close S with tactile/visual cues   Sit to Stand CARE Score 4   Transfer Bed/Chair/Wheelchair   Limitations Noted In Balance;LE Strength;Endurance   Adaptive Equipment Roller Walker   Sit to Stand Contact Guard   Stand to Sit Contact Guard   Findings Pt required verbal cues and tactile cues for safe hand placement with sit <-> stand.  Pt practiced sit <-> stand x 10 with manual cues for safe hand placement. S for technique, min A for hand placement. Later during treatment session, the pt again practiced sit <-> stand x 10 with focus on safe hand placement. Visual and tactile cues required for safety.   Walk 10 Feet   Type of Assistance Needed Incidental touching   Comment CGA to Close S with visual/tactile cues   Walk 10 Feet CARE Score 4   Walk 50 Feet with Two Turns   Type of Assistance Needed Incidental touching   Comment CGA to Close S with visual/tactile cues   Walk 50 Feet with Two Turns CARE Score 4   Walk 150 Feet   Type of Assistance Needed Incidental touching   Comment CGA to Close S with visual/tactile cues   Walk 150 Feet CARE Score 4   Walking 10 Feet on Uneven Surfaces   Type of Assistance Needed Incidental touching   Comment CGA to Close S with visual/tactile cues   Walking 10 Feet  on Uneven Surfaces CARE Score 4   Ambulation   Primary Mode of Locomotion Prior to Admission Walk   Distance Walked (feet) 165 ft  (207', 130')   Assist Device Roller Walker   Gait Pattern Inconsistant Maria [place];Decreased foot clearance   Limitations Noted In Balance;Coordination;Device Management;Endurance;Safety;Strength   Provided Assistance with: Balance;Direction   Walk Assist Level Contact Guard;Close Supervision   Findings Close S to CGA for gait with the RW.  Visual and tactile cues for safe management of RW. Pt required assist for safely managing RW on and off mat.  Once on mat, close S for balance.   Does the patient walk? 2. Yes   Stairs   Type Ramp   Weight Bearing Precautions Fall Risk   Assist Devices Roller Walker   Findings CGA to ascend/descend ramp with RW. Pt demonstrated good control with descending ramp.  Required assist to manage RW on and off ramp.   Toilet Transfer   Type of Assistance Needed Incidental touching   Comment CGA with RW   Toilet Transfer CARE Score 4   Toilet Transfer   Surface Assessed Raised Toilet   Limitations Noted In Balance;LE Strength;Safety   Adaptive Equipment   (elevated bedside commode)   Positioning Concerns Raised Seat;Safety   Findings Pt has difficulty with safe positioning in front of toilet. Slow to sidestep towards the R. Manual cues required.   Therapeutic Interventions   Balance Pt completed an obstacle course with the RW in which is changed directions to walk between 3 sets of quad canes, 2x.  Pt required visual and tactile cues to complete. CGA for balance. Pt also practiced stepping over 4 quad canes on the floor.  Pt required mod A to manage RW.  CGA for balance when stepping over quad cane.   Other ramp, uneven surfaces   Assessment   Treatment Assessment Pt agreeable to therapy. Limited by [facility].  Pt practiced multiple repetitions of sit <-> stand with focus on safe hand placement with transfers.  Pt required tactile cues 100% of the time for sit to stand.   With stand to sit, pt intermittently reaches for armrest with L UE.  Manual assist needed for R UE.  Pt with improving gait endurance.  Will benefit from continued PT to progress gait, transfers, steps, balance, strengthening, and safety in order to maximize function and decrease risk for falls.   Problem List Decreased strength;Decreased endurance;Impaired balance;Decreased mobility;Decreased safety awareness;Decreased cognition;Impaired judgement;Impaired hearing   PT Barriers   Physical Impairment Decreased strength;Decreased endurance;Impaired balance;Decreased mobility;Decreased safety awareness   Functional Limitation Stair negotiation;Transfers;Standing;Walking;Ramp negotiation;Car transfers   Plan   Treatment/Interventions Functional transfer training;LE strengthening/ROM;Therapeutic exercise;Endurance training;Elevations;Patient/family training;Gait training   Progress Progressing toward goals   PT Therapy Minutes   PT Time In 0705   PT Time Out 0805   PT Total Time (minutes) 60   PT Mode of treatment - Individual (minutes) 60   PT Mode of treatment - Concurrent (minutes) 0   PT Mode of treatment - Group (minutes) 0   PT Mode of treatment - Co-treat (minutes) 0   PT Mode of Treatment - Total time(minutes) 60 minutes   PT Cumulative Minutes 1101   Therapy Time missed   Time missed? No

## 2024-03-07 PROCEDURE — 92526 ORAL FUNCTION THERAPY: CPT

## 2024-03-07 PROCEDURE — 97110 THERAPEUTIC EXERCISES: CPT | Performed by: PHYSICAL THERAPIST

## 2024-03-07 PROCEDURE — 97116 GAIT TRAINING THERAPY: CPT | Performed by: PHYSICAL THERAPIST

## 2024-03-07 PROCEDURE — 97530 THERAPEUTIC ACTIVITIES: CPT

## 2024-03-07 PROCEDURE — 97535 SELF CARE MNGMENT TRAINING: CPT

## 2024-03-07 PROCEDURE — 99233 SBSQ HOSP IP/OBS HIGH 50: CPT | Performed by: PHYSICAL MEDICINE & REHABILITATION

## 2024-03-07 RX ORDER — BISACODYL 10 MG
10 SUPPOSITORY, RECTAL RECTAL DAILY PRN
Status: DISCONTINUED | OUTPATIENT
Start: 2024-03-07 | End: 2024-04-05 | Stop reason: HOSPADM

## 2024-03-07 RX ADMIN — PRAVASTATIN SODIUM 40 MG: 40 TABLET ORAL at 17:12

## 2024-03-07 RX ADMIN — Medication 1 TABLET: at 09:43

## 2024-03-07 RX ADMIN — Medication 6 MG: at 21:03

## 2024-03-07 RX ADMIN — NYSTATIN 500000 UNITS: 100000 SUSPENSION ORAL at 21:03

## 2024-03-07 RX ADMIN — NYSTATIN 500000 UNITS: 100000 SUSPENSION ORAL at 09:43

## 2024-03-07 RX ADMIN — LORATADINE 10 MG: 10 TABLET ORAL at 09:43

## 2024-03-07 RX ADMIN — NYSTATIN 500000 UNITS: 100000 SUSPENSION ORAL at 17:11

## 2024-03-07 RX ADMIN — SENNOSIDES 8.6 MG: 8.6 TABLET, FILM COATED ORAL at 21:03

## 2024-03-07 RX ADMIN — NYSTATIN 500000 UNITS: 100000 SUSPENSION ORAL at 13:24

## 2024-03-07 RX ADMIN — SERTRALINE HYDROCHLORIDE 25 MG: 25 TABLET ORAL at 09:43

## 2024-03-07 RX ADMIN — ENOXAPARIN SODIUM 40 MG: 40 INJECTION SUBCUTANEOUS at 09:43

## 2024-03-07 RX ADMIN — TAMSULOSIN HYDROCHLORIDE 0.8 MG: 0.4 CAPSULE ORAL at 17:11

## 2024-03-07 NOTE — NURSING NOTE
18 F Catheter removed per Dr orders . Pt tolerated well . Emptied 500 from cath drainage bag. Plan of care ongoing at this time.

## 2024-03-07 NOTE — CASE MANAGEMENT
CM met with patient and wife, reviewed team meeting with wife, as patient [place]. Made aware planned discharge date is 3/14/2024, with recommendations for home nursing/PT/OT/ST and HHA's. Home care referral sent to [facility] and [facility] via Epic.Awaiting response, CM will continue to follow.

## 2024-03-07 NOTE — PLAN OF CARE
Problem: PAIN - ADULT  Goal: Verbalizes/displays adequate comfort level or baseline comfort level  Description: Interventions:  - Encourage patient to monitor pain and request assistance  - Assess pain using appropriate pain scale  - Administer analgesics based on type and severity of pain and evaluate response  - Implement non-pharmacological measures as appropriate and evaluate response  - Consider cultural and social influences on pain and pain management  - Notify physician/advanced practitioner if interventions unsuccessful or patient reports new pain  Outcome: Progressing     Problem: INFECTION - ADULT  Goal: Absence or prevention of progression during hospitalization  Description: INTERVENTIONS:  - Assess and monitor for signs and symptoms of infection  - Monitor lab/diagnostic results  - Monitor all insertion sites, i.e. indwelling lines, tubes, and drains  - Monitor endotracheal if appropriate and nasal secretions for changes in amount and color  - [place] appropriate cooling/warming therapies per order  - Administer medications as ordered  - Instruct and encourage patient and family to use good hand hygiene technique  - Identify and instruct in appropriate isolation precautions for identified infection/condition  Outcome: Progressing  Goal: Absence of fever/infection during neutropenic period  Description: INTERVENTIONS:  - Monitor WBC    Outcome: Progressing

## 2024-03-07 NOTE — PROGRESS NOTES
03/07/24 1100   Pain Assessment   Pain Assessment Tool FLACC   Pain Score No Pain   Restrictions/Precautions   Precautions Aspiration;Bed/chair alarms;Cognitive;Fall Risk;Supervision on toilet/commode;Finley;Hard of hearing   Weight Bearing Restrictions No   ROM Restrictions No   Cognitive Linguisitic Assessments   Cognitive Linquistic Quick Test (CLQT) BCAT 8/21   Comprehension   Assist Devices Hearing Aid   QI: Comprehension 2. Sometimes Understands: Understands only basic conversations or simple, direct phrases. Frequently requires cues to understand   Comprehension (FIM) 2 - Understands basic info/conversation 25-49% of time   Expression   Non-Verbal Basic   QI: Expression 2. Frequently exhibits difficulty with expressing needs and ideas   Expression (FIM) 2 - Understands basic info/conversation 25-49% of time   Social Interaction   Social Interaction (FIM) 2 - Interacts appropriately 25-49% of time   Problem Solving   Problem solving (FIM) 2 - Solves basic problems 25-49% of time   Memory   Memory (FIM) 2 - Recognizes, recalls/performs 25-49%   Memory Skills   Orientation Level Oriented to person;Oriented to place   Consistencies Assessed and Performance   Materials Admnistered Nectar thick liquid   Oral Stage Mild impaired   Phargngeal Stage Moderate impaired   Swallow Mechanics Mild delayed;Swallow initation   Strategies and Efficacy small bites, small sips, slow rate   Recommendations   Risk for Aspiration Present   Recommendations Consider oral diet   Diet Solid Recommendation Level 1 Dysphagia/pureed   Diet Liquid Recommendation Nectar thick liquid   Recommended Form of Meds Crushed;With puree   General Precautions Aspiration precautions   Compensatory Swallowing Strategies Alternate solids and liquids   Results Reviewed with PT/Family/Caregiver   Eating   Type of Assistance Needed Supervision   Physical Assistance Level No physical assistance   Comment pt able to self-feed food items requires mod cue to  adhere to strategies including alt, secondary swallow, strong swallow   Eating CARE Score 4   Swallow Assessment   Swallow Treatment Assessment Pt assessed with NTL via provalve cup today, pt does require mod cue for head tilt, alt solids, secondary swallow, effortful swallow.  Today's session demonstrated pt with min carryover of those strategies, demonstrates ability to follow strategies with visual cues by therapist and spouse.  Pt with less overall overt cough today, coughing reflex appears intentional/habitual at times.   SLP Therapy Minutes   SLP Time In 1100   SLP Time Out 1130   SLP Total Time (minutes) 30   SLP Mode of treatment - Individual (minutes) 30   SLP Mode of treatment - Concurrent (minutes) 0   SLP Mode of treatment - Group (minutes) 0   SLP Mode of treatment - Co-treat (minutes) 0   SLP Mode of Treatment - Total time(minutes) 30 minutes   SLP Cumulative Minutes 630   Therapy Time missed   Time missed? No

## 2024-03-07 NOTE — PROGRESS NOTES
03/07/24 1030   Pain Assessment   Pain Assessment Tool FLACC   Pain Score No Pain   Restrictions/Precautions   Precautions Aspiration;Bed/chair alarms;Cognitive;Fall Risk;[place];Hard of hearing;Impulsive;Supervision on toilet/commode   Cognition   Overall Cognitive Status Impaired   Subjective   Subjective Indicates prefers to walk to exercise with choices provided.   Roll Left and Right   Type of Assistance Needed Supervision   Physical Assistance Level No physical assistance   Comment Poor awareness of brooks cath   Roll Left and Right CARE Score 4   Sit to Lying   Type of Assistance Needed Supervision   Physical Assistance Level No physical assistance   Comment Poor awareness of brooks cath   Sit to Lying CARE Score 4   Lying to Sitting on Side of Bed   Type of Assistance Needed Supervision   Physical Assistance Level No physical assistance   Comment Poor awareness of brooks cath   Lying to Sitting on Side of Bed CARE Score 4   Sit to Stand   Type of Assistance Needed Physical assistance   Physical Assistance Level 25% or less   Comment min A/CGA, poor technique with sit to stand   Sit to Stand CARE Score 3   Bed-Chair Transfer   Type of Assistance Needed Physical assistance   Physical Assistance Level 25% or less   Comment CGA, poor carryover of technique and safety despite visual cues   Chair/Bed-to-Chair Transfer CARE Score 3   Car Transfer   Reason if not Attempted Environmental limitations   Car Transfer CARE Score 10   Walk 10 Feet   Type of Assistance Needed Physical assistance   Physical Assistance Level 25% or less   Comment increased right sway, RW, min A   Walk 10 Feet CARE Score 3   Walk 50 Feet with Two Turns   Type of Assistance Needed Physical assistance   Physical Assistance Level 25% or less   Comment RW, min A   Walk 50 Feet with Two Turns CARE Score 3   Walk 150 Feet   Type of Assistance Needed Physical assistance   Physical Assistance Level 25% or less   Comment min A, RW, decreased maintaining  COG inside [place] of RW   Walk 150 Feet CARE Score 3   Walking 10 Feet on Uneven Surfaces   Type of Assistance Needed Physical assistance   Physical Assistance Level 25% or less   Comment CGA   Walking 10 Feet on Uneven Surfaces CARE Score 3   Ambulation   Primary Mode of Locomotion Prior to Admission Walk   Distance Walked (feet) 175 ft   Assist Device Roller Walker   Does the patient walk? 2. Yes   Toilet Transfer   Type of Assistance Needed Physical assistance   Physical Assistance Level 25% or less   Comment Assist with Finley management, dependent pericare, incontinence of bowel   Toilet Transfer CARE Score 3   Therapeutic Interventions   Strengthening Seated LE TE, 1# with 1:1 visual cues and manual cues for completion   Assessment   Treatment Assessment Patient presents to PT, agreeable to walking, disagreeable to TE.  Patient demonstrates ongoing challenges related to hearing, unclear understanding at times.  Relies on visual feedback, however, does not always attend to therapist during session. Requires 1:1 assist/supervision for visual feedback to complete TE this date.  No apparent recall fo TE from prior sessions this date.  In need of ongoing interventions to maximize return to PLOF and promote independence.  Cont per POC   Problem List Decreased strength;Decreased endurance;Impaired balance;Decreased cognition;Impaired judgement;Decreased safety awareness;Impaired hearing;Decreased range of motion;Decreased coordination   PT Barriers   Physical Impairment Decreased strength;Decreased endurance;Impaired balance;Decreased mobility;Decreased cognition;Impaired judgement;Decreased safety awareness;Impaired hearing   Functional Limitation Car transfers;Ramp negotiation;Stair negotiation;Standing;Transfers;Walking   Plan   Treatment/Interventions Functional transfer training;LE strengthening/ROM;Elevations;Therapeutic exercise;Endurance training;Cognitive reorientation;Patient/family training;Equipment  eval/education;Bed mobility;Gait training   Progress Progressing toward goals   PT Therapy Minutes   PT Time In 1030   PT Time Out 1100   PT Total Time (minutes) 30   PT Mode of treatment - Individual (minutes) 30   PT Mode of treatment - Concurrent (minutes) 0   PT Mode of treatment - Group (minutes) 0   PT Mode of treatment - Co-treat (minutes) 0   PT Mode of Treatment - Total time(minutes) 30 minutes   PT Cumulative Minutes 1191     Patient remains OOB in chair, all needs in reach.  Transition to ST.  Alarm in place and activated.  Encouraged use of call bell as patient was observed just prior to PT session performing transfer from wheelchair to bed unassisted.

## 2024-03-07 NOTE — NUTRITION
03/07/24 1609   Biochemical Data,Medical Tests, and Procedures   Biochemical Data/Medical Tests/Procedures Lab values reviewed;Meds reviewed   Labs (Comment) 3/4 creat:0.58, ALP:111, alb:3.2   Meds (Comment) dulcolax, lovenox, clairtin, antivert, melatonin, centrum, pravachol, zoloft   Speech Therapy Recommendations (Comment) Continues to receive speech therapy   Nutrition-Focused Physical Exam   Nutrition-Focused Physical Exam Findings RN skin assessment reviewed;No skin issues documented  (no pressure areas noted)   Medical-Related Concerns PMH reviewed.   Adequacy of Intake   Nutrition Modality PO   Feeding Route   PO Independent   Current PO Intake   Current Diet Order Dysphagia 1, NTL extra sauce/gravy with food supplements   Nutrition Supplements Magic Cup;Mighty Shake  (mightyshake TID, magic cup BID)   Current Meal Intake 50-75%;%   Intake Supplements 25-50%;50-75%;%   Estimated calorie intake compared to estimated need Appears to be meeting minimum nutrient needs.   PES Statement   Problem Continue previous diagnosis   Recommendations/Interventions   Malnutrition/BMI Present Yes  (as previously noted)   Summary Dysphagia 1, NTL extra sauce/gravy. B: oatmeal, yogurt, applesauce, pudding. L/D: yogurt, applesauce, pudding. Mightyshake TID. Magic cup BID. Meal completions mostly 75%. Supplement intake varies. Current body weight requested, obtained by staff today. 3/7 130#, BMI=17.76; 4# weight gain from admission 2/20 126#. Medications reviewed. Oriented to person/place. Deaf. No edema noted. LBM 3/7. Indwelling catheter present. Barium swallow study ordered per notes. Per spouse patient has been doing well with eating. Continue current nutrition interventions.   Interventions/Recommendations Continue current diet order;Supplement continue;Other (Specify)  (continue food supplements)   Education Assessment   Education Patient/caregiver not appropriate for education at this time   Patient  Nutrition Goals   Goal Avoid weight loss;Tolerate PO diet;Meet PO needs   Goal Status Met;Extended   Timeframe to complete goal by next f/u   Nutrition Complexity Risk   Nutrition complexity level Moderate risk   Nutrition review: 03/13/24   Follow up date 03/13/24

## 2024-03-07 NOTE — PROGRESS NOTES
03/07/24 0700   Pain Assessment   Pain Assessment Tool 0-10   Pain Score No Pain   Restrictions/Precautions   Precautions Aspiration;Bed/chair alarms;Cognitive;Fall Risk;Finley;Hard of hearing   Weight Bearing Restrictions No   ROM Restrictions No   Eating   Type of Assistance Needed Supervision   Physical Assistance Level No physical assistance   Comment pt able to self-feed desired foods on breakfast tray with supervision and occasional redirection to continue eating, pt with coughing approx 25% of the time with visual cues to swallow after coughing to ensure throat cleared   Eating CARE Score 4   Eating Assessment   Food To Mouth Yes   Able To Cut No   Noted Coughing   Positioning Upright;Out of Bed   Safety Needs Increase Time;Cues;Freq Swallow   Meal Assessed Breakfast   Intake Mode PO   Finishes Timely No   Opens Packages No   Oral Hygiene   Type of Assistance Needed Supervision   Physical Assistance Level No physical assistance   Comment after rinsing mouth, pt spit liquids out prior to OT placing basin in front of mouth   Oral Hygiene CARE Score 4   Grooming   Able To Comb/Brush Hair;Wash/Dry Face;Brush/Clean Teeth   Limitation Noted In Problem Solving;Safety;Sequencing   Shower/Bathe Self   Type of Assistance Needed Physical assistance   Physical Assistance Level 26%-50%   Comment assist for buttocks and bilat lower LE; Finley care completed by OT   Shower/Bathe Self CARE Score 3   Bathing   Assessed Bath Style Sponge Bath   Anticipated D/C Bath Style Sponge Bath;Shower   Able to Gather/Transport No   Able to Wash/Rinse/Dry (body part) Left Arm;Right Arm;L Upper Leg;R Upper Leg;Abdomen;Chest;Perineal Area   Limitations Noted in Balance;Endurance;Problem Solving;Safety;ROM;Sequencing   Positioning Seated;Standing   Tub/Shower Transfer   Limitations Noted In Balance;Endurance;Problem Solving;Safety;Sequencing;LE Strength   Adaptive Equipment Grab Bars;Seat with Back   Assessed Tub/shower combo   Findings pt  and OT practiced tub-shower transfer with in-shower seat in prep for use at home, after reorientation to transfer method via demonstration by OT pt completed transfer with Brayan to maintain balance and physical cues for sequencing; pt much safer during this trial than previous trial a few days ago; pt and OT discussed possibility of taking shower for next ADL session with pt in agreement   Upper Body Dressing   Type of Assistance Needed Physical assistance   Physical Assistance Level 26%-50%   Comment assist to doff/don flannel shirt over bilat UE, doff t-shirt from R lower UE, align new t-shirt to don   Upper Body Dressing CARE Score 3   Lower Body Dressing   Type of Assistance Needed Physical assistance   Physical Assistance Level 76% or more   Comment able to pull clothing over hips after physical cues from OT   Lower Body Dressing CARE Score 2   Putting On/Taking Off Footwear   Type of Assistance Needed Physical assistance   Physical Assistance Level 76% or more   Comment able to present bilat feet to OT to doff/don socks   Putting On/Taking Off Footwear CARE Score 2   Dressing/Undressing Clothing   Remove UB Clothes Pullover Shirt;Jacket   Don UB Clothes Pullover Shirt;Jacket   Remove LB Clothes Undergarment;Socks   Don LB Clothes Pants;Undergarment;Socks   Limitations Noted In Balance;Endurance;Problem Solving;Safety;Sequencing;ROM   Positioning Supported Sit   Lying to Sitting on Side of Bed   Type of Assistance Needed Physical assistance   Physical Assistance Level 25% or less   Lying to Sitting on Side of Bed CARE Score 3   Sit to Stand   Type of Assistance Needed Physical assistance   Physical Assistance Level 25% or less   Sit to Stand CARE Score 3   Bed-Chair Transfer   Type of Assistance Needed Physical assistance   Physical Assistance Level 25% or less   Chair/Bed-to-Chair Transfer CARE Score 3   Transfer Bed/Chair/Wheelchair   Limitations Noted In Balance;Endurance;Problem Solving;Sequencing;LE  Strength   Adaptive Equipment Roller Walker   Toileting Hygiene   Comment incontinent bowel smears in underwear during ADL   Exercise Tools   Theraputty OT isolated objects in yellow putty for pt to remove with R hand as pt with difficulty sequencing how to remove objects via searching through putty himself   Cognition   Overall Cognitive Status Impaired   Arousal/Participation Alert;Cooperative   Attention Attends with cues to redirect   Orientation Level Oriented to person;Oriented to place;Oriented to time   Memory Decreased short term memory;Decreased recall of precautions   Following Commands Follows one step commands with increased time or repetition   Assessment   Treatment Assessment Pt agreeable to OT session this AM. Received lying supine in bed. ADL session completed; current LOF and details listed in respective sections. Pt is overall maxA LB dressing, Brayan bathing and UB dressing, supervision oral care and grooming; fxl transfers overall Brayan including shower transfer practiced after bathing in prep to use upon d/c home. Pt initiated more ADL tasks this session, for example doffing flannel shirt and pulling up clothing without cueing to do so. Incontinent smear noted in brief; RN notified. Pt completed fine motor/intrinsic hand strength exercise after ADL, however difficulty sequencing/problem solving and required increased assist from OT to complete. Breakfast meal completed with supervision; pt responds well to countdown cues when reporting feeling full in order to eat more on tray. Pt alert and an active participant in therapy this session. Pt's barriers to d/c include decreased strength throughout, decreased balance, impaired hearing, impaired cognition including safety awareness, problem solving, attention, comprehension, and expression, and decreased activity tolerance; all affect independence in self care and fxl transfers. Pt would benefit from continued skilled OT services in order to address  listed barriers and prepare for safe d/c.   Prognosis Fair   Problem List Decreased strength;Decreased endurance;Impaired balance;Decreased cognition;Impaired judgement;Decreased safety awareness;Impaired hearing;Decreased range of motion;Decreased coordination   Plan   Treatment/Interventions ADL retraining;Functional transfer training;LE strengthening/ROM;Therapeutic exercise;Endurance training;Cognitive reorientation;Patient/family training;Equipment eval/education;Compensatory technique education;OT   Progress Progressing toward goals   OT Therapy Minutes   OT Time In 0700   OT Time Out 0832   OT Total Time (minutes) 92   OT Mode of treatment - Individual (minutes) 92

## 2024-03-07 NOTE — PROGRESS NOTES
Progress Note - Thomas Chase 83 y.o. male MRN: [id number]    Unit/Bed#: [facility] 213-02 Encounter: [id number]        Subjective:   Patient seen and examined at bedside after reviewing the chart and discussing the case with the caring staff.      Patient examined at bedside.  Patient has no reported acute symptoms.  He has repeat barium swallow study scheduled today.  Plan for Finley catheter removal afterwards for voiding trial.     Physical Exam   Vitals: Blood pressure 127/60, pulse 105, temperature 97.5 °F (36.4 °C), temperature source Temporal, resp. rate 17, height 6' (1.829 m), weight 55.4 kg (122 lb 2.2 oz), SpO2 98%.,Body mass index is 16.56 kg/m².  Constitutional: Patient in no acute distress.  HEENT: PERR, EOMI, MMM.  Cardiovascular: Normal rate and regular rhythm.    Pulmonary/Chest: Effort normal and breath sounds normal.   Abdomen: Soft, + BS, NT.    Assessment/Plan:  Thomas Chase is a(n) 83 y.o. year old male with left SDH and right SAH.     1.  Cardiac with hx of HTN, HLD.  On pravastatin 40 mg daily (simvastatin nonformulary).  Continue to monitor BP.  2.  Allergic rhinitis.  Patient is on loratadine 10 mg daily.  3.  Depression/anxiety.  Patient is on Zoloft 25 mg daily.  4.  Insomnia.  Patient is on melatonin 6 mg at bedtime.  5.  Thrush.  Nystatin liquid swish and swallow 4 times daily.  6.  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  Patient is mainly nonverbal.  7.  Urinary retention.  Urinary retention protocol.  Started on Flomax 0.4 mg daily 2/21/24.  Urology consulted.  Finley catheter placed 2/22/24.  Finley removed on 2/28/24.  Finley placed again 3/1.   8.  Dysphagia.  Speech therapy following.  Dysphagia 1 puureed with nectar thick liquid.  9.  Pain and bowel management.  As per physiatrist.  10.  Intracranial hemorrhage.   Patient will be receiving intensive PT OT ST as per physiatrist.    Anticipated discharge date:  Thursday, 3/14/24  PCP:  RONY Diaz    The  patient was discussed with Dr. Paz and he is in agreement with the above note.

## 2024-03-07 NOTE — PROGRESS NOTES
03/07/24 1400   Pain Assessment   Pain Assessment Tool 0-10   Pain Score No Pain   Restrictions/Precautions   Precautions Aspiration;Bed/chair alarms;Cognitive;Fall Risk;Hard of hearing;Impulsive;Finley;Supervision on toilet/commode;Visual deficit   Cognition   Overall Cognitive Status Impaired   Subjective   Subjective Patient's wife present and states she watches him walk when she's here.  Thinks he is going to make great progress with mobility and return to independence as he is here another week.   Roll Left and Right   Type of Assistance Needed Supervision   Physical Assistance Level No physical assistance   Roll Left and Right CARE Score 4   Sit to Lying   Type of Assistance Needed Supervision   Physical Assistance Level No physical assistance   Sit to Lying CARE Score 4   Lying to Sitting on Side of Bed   Type of Assistance Needed Supervision   Physical Assistance Level No physical assistance   Lying to Sitting on Side of Bed CARE Score 4   Sit to Stand   Type of Assistance Needed Physical assistance   Physical Assistance Level 25% or less   Comment CGA, RW   Sit to Stand CARE Score 3   Bed-Chair Transfer   Type of Assistance Needed Physical assistance   Physical Assistance Level 25% or less   Comment min A, RW   Chair/Bed-to-Chair Transfer CARE Score 3   Car Transfer   Reason if not Attempted Environmental limitations   Car Transfer CARE Score 10   Walk 10 Feet   Type of Assistance Needed Physical assistance   Physical Assistance Level 25% or less   Comment RW   Walk 10 Feet CARE Score 3   Walk 50 Feet with Two Turns   Type of Assistance Needed Physical assistance   Physical Assistance Level 25% or less   Comment RW   Walk 50 Feet with Two Turns CARE Score 3   Walk 150 Feet   Type of Assistance Needed Physical assistance   Physical Assistance Level 25% or less   Comment RW   Walk 150 Feet CARE Score 3   Walking 10 Feet on Uneven Surfaces   Type of Assistance Needed Physical assistance   Physical Assistance  Level 25% or less   Comment RW   Walking 10 Feet on Uneven Surfaces CARE Score 3   Ambulation   Primary Mode of Locomotion Prior to Admission Walk   Distance Walked (feet) 175 ft   Assist Device Roller Walker   Does the patient walk? 2. Yes   Curb or Single Stair   Style negotiated Single stair   Type of Assistance Needed Physical assistance   Physical Assistance Level 25% or less   Comment min A/CGA, step over step pattern, bilateral HRs   1 Step (Curb) CARE Score 3   4 Steps   Type of Assistance Needed Physical assistance   Physical Assistance Level 25% or less   Comment min A/CGA, bilateral HRs   4 Steps CARE Score 3   12 Steps   Type of Assistance Needed Physical assistance   Physical Assistance Level 25% or less   Comment min A, bilateral HRs   12 Steps CARE Score 3   Picking Up Object   Reason if not Attempted Safety concerns   Picking Up Object CARE Score 88   Equipment Use   NuStep L1, 10 mins   Assessment   Treatment Assessment Patient presents to PT, ambulates with RW.  Patient completes Nustep, HR  116 bpm, +97% on RA.  Patient requires max VCs and MCs for mobility and safety.  PT continues to encourage wife's participation in sessions.  cont per POC.   Problem List Decreased strength;Decreased endurance;Impaired balance;Decreased cognition;Impaired judgement;Decreased safety awareness;Impaired hearing;Decreased range of motion;Decreased coordination   PT Barriers   Physical Impairment Decreased strength;Decreased endurance;Impaired balance;Decreased mobility;Decreased cognition;Impaired judgement;Decreased safety awareness;Impaired hearing   Functional Limitation Car transfers;Ramp negotiation;Stair negotiation;Standing;Transfers;Walking   Plan   Treatment/Interventions Functional transfer training;LE strengthening/ROM;Elevations;Therapeutic exercise;Endurance training;Cognitive reorientation;Patient/family training;Equipment eval/education;Bed mobility;Gait training   Progress Progressing toward goals    PT Therapy Minutes   PT Time In 1400   PT Time Out 1430   PT Total Time (minutes) 30   PT Mode of treatment - Individual (minutes) 30   PT Mode of treatment - Concurrent (minutes) 0   PT Mode of treatment - Group (minutes) 0   PT Mode of treatment - Co-treat (minutes) 0   PT Mode of Treatment - Total time(minutes) 30 minutes   PT Cumulative Minutes 1221     Patient returned to bed, all needs in reach.  Alarm in place and activated.  Encouraged use of call bell, patient verbalizes understanding.

## 2024-03-07 NOTE — PROGRESS NOTES
PM&R PROGRESS NOTE:  Thomas Chase 83 y.o. male MRN: [id number]  Unit/Bed#: -02 Encounter: [id number]        **CHANGE IN REHAB DIAGNOSIS FROM PRE-ADMISSION SCREEN  Rehab Diagnosis: Impairment of mobility, safety, Activities of Daily Living (ADLs), and cognitive/communication skills due to Brain Dysfunction:  02.22  Traumatic, Closed Injury  Etiologic Diagnosis: L SDH, R SAH, L anterior hypodensity lateral temporal lobe    HPI: Thomas Chase is a 83 y.o. male with past medical history significant for previous fall with resultant traumatic brain injury-subdural hematoma in 2022 status post left craniotomy, chronic aphasia, hard of hearing with cochlear implant in place, hyperlipidemia who presented to the [facility] on 2/13/2024 with increased weakness and mental status change for several days.  Unclear if patient had head trauma.  CT head showing a 3 mm left frontal subdural hemorrhage.  CT lumbar spine showing DJD.  Patient found to have dysphagia and was seen by speech therapy.  VFSS on 2/15/2024 cleared him for a puréed diet with thin liquids.  Patient was also started by psychiatry for depression and started on Zoloft 25 mg daily.  Patient sustained a rapid response and a unwitnessed fall with head trauma notable bump on head on 2/15/2024.  CT head showing a new small volume acute subarachnoid hemorrhage in the left frontal opercular region as well as new nondisplaced fracture of the zygomatic arch with soft tissue contusion, stable 3 mm subdural hemorrhage seen previously.  Patient transferred to [facility] for further evaluation.  Patient seen by neurology and neurosurgery.  CTA head and neck showing a left anterior hypodensity in the left anterior lateral temporal lobe with 2 punctate hyperdensities.  These represented possible nonhemorrhagic contusions or venous infarcts.  Less likely to represent ischemia or neoplasm.  Follow-up imaging recommended with CT head in 2-3 weeks.  CT  venogram showing patent dural venous sinuses.  Patient was cleared for DVT prophylaxis and started on Keppra for seizure prophylaxis.    Medical course complicated by suspected UTI based on UA and symptoms.  Patient was treated with antibiotics x 3 days.  After medical stabilization, patient found to have acute functional deficits from his baseline in mobility, self-care, speech swallow cognition therefore admitted to [facility] for acute inpatient rehabilitation.    SUBJECTIVE: Patient seen briefly today.  Barium swallow study with speech therapy today.  Afterwards Finley catheter to be removed as patient did have a small BM yesterday.  Attempt trial of void.      ASSESSMENT: Stable, progressing      PLAN:    Rehabilitation  Functional deficits: Aphasia, dysphagia, impaired cognition, impaired balance, impulsivity, poor safety awareness impaired mobility, self care    Continue current rehabilitation plan of care to maximize function.    Expected Discharge: Tuesday 3/12/24 with home care RN, PT, OT, SLP, Aide       Current Function:  Physical Therapy Occupational Therapy Speech Therapy   Weight Bearing Status: Weight Bearing as Tolerated  Transfers: Incidental Touching, Supervision (cg/close S)  Bed Mobility: Incidental Touching, Supervision (cg/close S)  Amulation Distance (ft): 180 feet  Ambulation: Incidental Touching, Supervision (cg/close S)  Assistive Device for Ambulation: Roller Walker  Wheelchair Mobility Distance: 133 ft  Wheelchair Mobility: Maximum Assistance (max visual and tactile cues)  Number of Stairs: 14  Assistive Device for Stairs: Bilateral Hand Rails  Stair Assistance:  (cg/close S)  Ramp: Incidental Touching  Assistive Device for Ramp: Roller Walker  Discharge Recommendations: Home with:  DC Home with:: 24 Hour Assisteance, Family Support, First Floor Setup, Home Physical Therapy   Eating: Minimal Assistance  Grooming: Supervision  Bathing: Minimal Assistance  Bathing: Minimal  Assistance  Upper Body Dressing: Minimal Assistance  Lower Body Dressing: Maximum Assistance  Toileting: Maximum Assistance  Tub/Shower Transfer:  (TBA)  Toilet Transfer: Incidental Touching, Minimal Assistance  Cognition: Exceptions to WNL  Cognition: Decreased Memory, Decreased Executive Functions, Decreased Attention, Decreased Comprehension  Orientation: Person, Place, Time   Mode of Communication: Non-verbal  Speech/Language: Expressive Aphasia  Cognition: Exceptions to WNL  Cognition: Decreased Memory, Decreased Attention, Decreased Executive Functions, Decreased Comprehension, Decreased Safety  Orientation: Person, Place, Time  Swallowing: Exceptions to WNL  Swallowing: Oral Dysphagia, Pharyngeal Dysphagia  Diet Recommendations: Level 1/Puree, [address] Thick  Discharge Recommendations: Home with:  DC Home with:: 24 Hour Supervision, Home Speech Therapy, Family Support         DVT prophylaxis  Continue Lovenox      * Intracranial hemorrhage (HCC)  Assessment & Plan  Initial presentation on 2/13/2024 with increased weakness in the legs, mental status changes for several days  CT head showing a 3 mm left frontal subdural hemorrhage  Rapid response 2/15/2024 with fall and head trauma  CT head showing a new small volume acute subarachnoid hemorrhage in the left frontal opercular region and new nondisplaced fracture of the zygomatic arch with soft tissue contusion.    Transferred to [facility]  CTA head and neck and and repeat CT head showing a left anterior hypodensity in the left anterior lateral temporal lobe with 2 punctate hyperdensities.  Differential diagnosis nonhemorrhagic contusions or venous infarcts.  Patient seen by neurology and neurosurgery  Conservative management  Cleared for DVT prophylaxis  Started on Keppra for seizure prophylaxis  Repeat CT head scheduled on Monday, 3/4/2024 -personally reviewed  Radiology read as follows:  Improving left anterior temporal lobe contusions with resolving edema. Stable  subacute subdural hemorrhage along the anterior left frontal convexity. Resolved subarachnoid and intraventricular hemorrhages. Unchanged minimal hyperdensity beneath the left craniotomy flap. Stable right parafalcine subdural hygroma. There is no new intra or extra-axial hemorrhage.  Follow-up with neurosurgery on 3/6/2024 virtually completed.  Recommendations as follows: Neurosurgery will sign off, patient will follow-up in 2 weeks with repeat CT head   Follow-up with neurosurgery and neurology in outpatient clinic.    Behavior safety risk  Assessment & Plan  Patient demonstrating mild impulsivity in acute care  Fall x 2 in acute care  Falls at home in the past    Safety behavior plan going well and ARC.  No further impulsivity  Nursing and staff to provide close supervision near nursing station  Patient placed on 4 side rails (not as a restraint, patient and wife preference)  Monitor sleep-wake cycle  Avoid overstimulation: 1 visitor at a time, low lights, low sounds      Hematuria  Assessment & Plan  Started 3/2 - 3/3/2024  Minor, and more likely related to irritation from Finley catheter  Irrigation as needed per urology  Asymptomatic  H&H stable    Headache  Assessment & Plan  Resolved  Continue Tylenol 650 mg PRN     Severe protein-calorie malnutrition (HCC)  Assessment & Plan  BMI 17  Nutrition following  Optimize oral intake  Supplements ordered    Dysphagia  Assessment & Plan  VFSS completed 2/15/2024  Cleared for a puréed diet with nectar thick liquids  SLP to follow while at [facility]  Repeat VFSS 3/7/2024.    Urinary retention  Assessment & Plan  Finley catheter removed on [facility]  Trial of void underway  Mixed pattern  Unfortunately required multiple straight catheterizations  Finley catheter replaced 3/1/24 per urology  Flomax increased to 0.8 mg every afternoon per urology  Trial of void in 5-7 days again.    Trial of void 3/7/2024.  Finley catheter to be removed  Urinary retention protocol      Constipation due  to neurogenic bowel  Assessment & Plan  Patient started on more aggressive bowel program yesterday  Small bowel movement 3/6/2024 post suppository    Hearing loss  Assessment & Plan  Chronic hearing loss  Patient status post cochlear implant  MRI contraindicated    Hypercholesterolemia  Assessment & Plan  Continue simvastatin 20 mg daily (home dose)    UTI (urinary tract infection)-resolved as of 2/21/2024  Assessment & Plan  Suspected UTI based on UA and symptoms in acute care  Completed 3 days IV antibiotics        Appreciate IM consultants medical co-management.  Labs, medications, and imaging personally reviewed.      ROS:  A ten point review of systems was completed on 03/07/24 and pertinent positives are listed in subjective section. All other systems reviewed were negative.       OBJECTIVE:   /60 (BP Location: Left arm)   Pulse 105   Temp 97.5 °F (36.4 °C) (Temporal)   Resp 17   Ht 6' (1.829 m)   Wt 55.4 kg (122 lb 2.2 oz)   SpO2 98%   BMI 16.56 kg/m²     Physical Exam  Vitals and nursing note reviewed.   Constitutional:       General: He is not in acute distress.  HENT:      Head: Normocephalic and atraumatic.      Ears:      Comments: Impaired hearing     Nose: Nose normal.      Mouth/Throat:      Mouth: Mucous membranes are moist.   Eyes:      Conjunctiva/sclera: Conjunctivae normal.   Cardiovascular:      Rate and Rhythm: Normal rate and regular rhythm.      Pulses: Normal pulses.   Pulmonary:      Effort: Pulmonary effort is normal.      Breath sounds: Normal breath sounds. No wheezing or rales.   Abdominal:      General: Bowel sounds are normal. There is no distension.      Palpations: Abdomen is soft.      Tenderness: There is no abdominal tenderness.   Musculoskeletal:         General: No swelling.      Cervical back: Neck supple.   Skin:     General: Skin is warm.   Neurological:      Mental Status: He is alert and oriented to person, place, and time.      Comments: aphasic    Psychiatric:         Mood and Affect: Mood normal.          Lab Results   Component Value Date    WBC 5.31 03/04/2024    HGB 12.5 03/04/2024    HCT 38.5 03/04/2024    MCV 98 03/04/2024     (H) 03/04/2024     Lab Results   Component Value Date    SODIUM 140 03/04/2024    K 4.3 03/04/2024     03/04/2024    CO2 28 03/04/2024    BUN 21 03/04/2024    CREATININE 0.58 (L) 03/04/2024    GLUC 86 03/04/2024    CALCIUM 8.8 03/04/2024     Lab Results   Component Value Date    INR 1.02 06/02/2022    INR 1.10 05/29/2022    INR 1.04 05/28/2022    PROTIME 13.0 06/02/2022    PROTIME 13.7 05/29/2022    PROTIME 13.2 05/28/2022           Current Facility-Administered Medications:     acetaminophen (TYLENOL) tablet 650 mg, 650 mg, Oral, Q6H PRN, Daniel Clayton MD, 650 mg at 03/03/24 2140    bisacodyl (DULCOLAX) rectal suppository 10 mg, 10 mg, Rectal, Daily PRN, Darwin Paz MD    enoxaparin (LOVENOX) subcutaneous injection 40 mg, 40 mg, Subcutaneous, Q24H JEANNIE, Alexandrea Lugo MD, 40 mg at 03/07/24 0943    lactulose (CHRONULAC) oral solution 20 g, 20 g, Oral, BID PRN, Darwin Pza MD, 20 g at 03/05/24 2107    loratadine (CLARITIN) tablet 10 mg, 10 mg, Oral, Daily, Amada L Dagnall, PA-C, 10 mg at 03/07/24 0943    meclizine (ANTIVERT) tablet 25 mg, 25 mg, Oral, Q8H PRN, Amada L Dagnall, PA-C    melatonin tablet 6 mg, 6 mg, Oral, HS, Amada L Dagnall, PA-C, 6 mg at 03/06/24 2130    multivitamin-minerals (CENTRUM) tablet 1 tablet, 1 tablet, Oral, Daily, Amada L Dagnall, PA-C, 1 tablet at 03/07/24 0943    nystatin (MYCOSTATIN) oral suspension 500,000 Units, 500,000 Units, Swish & Swallow, 4x Daily, Darwin Paz MD, 500,000 Units at 03/07/24 0943    polyethylene glycol (MIRALAX) packet 17 g, 17 g, Oral, Daily PRN, Ashley Depadua, MD, 17 g at 03/05/24 1740    pravastatin (PRAVACHOL) tablet 40 mg, 40 mg, Oral, Daily With Dinner, Amada Epperson PA-C, 40 mg at 03/06/24 1730    senna (SENOKOT) tablet 8.6 mg, 1  tablet, Oral, HS, Alexandrea Lugo MD, 8.6 mg at 03/06/24 2130    sertraline (ZOLOFT) tablet 25 mg, 25 mg, Oral, Daily, Amada Epperson PA-C, 25 mg at 03/07/24 0943    tamsulosin (FLOMAX) capsule 0.8 mg, 0.8 mg, Oral, Daily With Dinner, Norberto Boyer MD, 0.8 mg at 03/06/24 1730    Past Medical History:   Diagnosis Date    Arthritis     Encounter for general adult medical examination without abnormal findings 03/20/2019    Fall 11/03/2022    Hyperlipidemia     Macular degeneration, wet ([facility])     Urinary retention 06/02/2022       Patient Active Problem List    Diagnosis Date Noted    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    Behavior safety risk 02/20/2024    Hematuria 03/04/2024    Headache 02/20/2024    Bilateral lower extremity weakness 02/17/2024    Abnormal CT scan, lumbar spine 02/15/2024    Severe protein-calorie malnutrition (HCC) 02/14/2024    Debility 02/13/2024    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    Dysphagia 11/21/2023    Macular degeneration, age related 02/27/2023    Traumatic subdural hemorrhage with loss of consciousness of unspecified duration, initial encounter ([facility]) 02/27/2023    Sensorineural hearing loss (SNHL), bilateral 08/18/2022    Balance disorder 06/28/2022    Abnormal echocardiogram 06/27/2022    Right bundle-branch block 06/27/2022    Moderate protein-calorie malnutrition (HCC) 06/19/2022    Hypotension 06/19/2022    Diastolic dysfunction 06/19/2022    EKG abnormalities 06/19/2022    Constipation due to neurogenic bowel 06/02/2022    Urinary retention 06/02/2022    SDH (subdural hematoma) ([facility]) 05/27/2022    Primary osteoarthritis of left knee 05/17/2022    Hygroma 04/26/2022    Right hand pain     Carpal tunnel syndrome on right     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    Overweight (BMI 25.0-29.9) 01/27/2021    Negative depression screening 09/26/2019    Hypercholesterolemia 07/12/2018    Borderline hypertension 07/12/2018    Hearing loss 04/08/2009          Alexandrea  Russ Lugo MD    I have spent a total time of 57 minutes on 03/07/24 in caring for this patient including Instructions for management, Patient and family education, Impressions, Counseling / Coordination of care, Documenting in the medical record, Reviewing / ordering tests, medicine, procedures  , and Communicating with other healthcare professionals .      ** Please Note:  voice to text software may have been used in the creation of this document. Although proof errors in transcription or interpretation are a potential of such software**

## 2024-03-07 NOTE — NURSING NOTE
Pt Incontinent loose/soft stool x 3 during my shift, radha left off sacrum at this time due to soiling when he has the loose/soft stool. Cream applied to sacral area and scrotum after cleaning.

## 2024-03-08 ENCOUNTER — HOME HEALTH ADMISSION (OUTPATIENT)
Dept: HOME HEALTH SERVICES | Facility: HOME HEALTHCARE | Age: 84
End: 2024-03-08

## 2024-03-08 PROCEDURE — 97530 THERAPEUTIC ACTIVITIES: CPT | Performed by: PHYSICAL THERAPIST

## 2024-03-08 PROCEDURE — 97530 THERAPEUTIC ACTIVITIES: CPT

## 2024-03-08 PROCEDURE — 92526 ORAL FUNCTION THERAPY: CPT

## 2024-03-08 PROCEDURE — 97110 THERAPEUTIC EXERCISES: CPT | Performed by: PHYSICAL THERAPIST

## 2024-03-08 PROCEDURE — 97535 SELF CARE MNGMENT TRAINING: CPT

## 2024-03-08 PROCEDURE — 97116 GAIT TRAINING THERAPY: CPT | Performed by: PHYSICAL THERAPIST

## 2024-03-08 PROCEDURE — 99232 SBSQ HOSP IP/OBS MODERATE 35: CPT | Performed by: PHYSICAL MEDICINE & REHABILITATION

## 2024-03-08 PROCEDURE — 0T9B70Z DRAINAGE OF BLADDER WITH DRAINAGE DEVICE, VIA NATURAL OR ARTIFICIAL OPENING: ICD-10-PCS | Performed by: PHYSICAL MEDICINE & REHABILITATION

## 2024-03-08 RX ADMIN — LORATADINE 10 MG: 10 TABLET ORAL at 08:41

## 2024-03-08 RX ADMIN — SENNOSIDES 8.6 MG: 8.6 TABLET, FILM COATED ORAL at 21:36

## 2024-03-08 RX ADMIN — NYSTATIN 500000 UNITS: 100000 SUSPENSION ORAL at 17:17

## 2024-03-08 RX ADMIN — NYSTATIN 500000 UNITS: 100000 SUSPENSION ORAL at 21:34

## 2024-03-08 RX ADMIN — TAMSULOSIN HYDROCHLORIDE 0.8 MG: 0.4 CAPSULE ORAL at 17:17

## 2024-03-08 RX ADMIN — Medication 1 TABLET: at 08:41

## 2024-03-08 RX ADMIN — ENOXAPARIN SODIUM 40 MG: 40 INJECTION SUBCUTANEOUS at 08:40

## 2024-03-08 RX ADMIN — NYSTATIN 500000 UNITS: 100000 SUSPENSION ORAL at 12:50

## 2024-03-08 RX ADMIN — PRAVASTATIN SODIUM 40 MG: 40 TABLET ORAL at 17:17

## 2024-03-08 RX ADMIN — NYSTATIN 500000 UNITS: 100000 SUSPENSION ORAL at 08:41

## 2024-03-08 RX ADMIN — SERTRALINE HYDROCHLORIDE 25 MG: 25 TABLET ORAL at 08:41

## 2024-03-08 RX ADMIN — Medication 6 MG: at 21:36

## 2024-03-08 NOTE — PROGRESS NOTES
03/08/24 1336   Pain Assessment   Pain Assessment Tool 0-10   Pain Score No Pain   Restrictions/Precautions   Precautions Aspiration;Bed/chair alarms;Cognitive;Fall Risk;Hard of hearing   Weight Bearing Restrictions No   ROM Restrictions No   Exercise Tools   Other Exercise Tool 1 3x5 pronation/supination and internal/external rotatoin using yellow flexbar   Other Exercise Tool 2 placed pegs in foam pegboard with R hand following pattern set by OT, difficulty obtaining full shoulder flexion AROM to reach furthest row on board until bed raised appropriately   Cognition   Overall Cognitive Status Impaired   Arousal/Participation Alert;Cooperative   Attention Attends with cues to redirect   Orientation Level Oriented to person;Oriented to place;Oriented to time   Memory Decreased short term memory;Decreased recall of precautions   Following Commands Follows one step commands with increased time or repetition   Assessment   Treatment Assessment Pt seen for brief OT session this PM focusing on fine motor, cognitive, and activity tolerance activities. Pt reported fatigued at start of session and took rest breaks during session as needed. Pt and OT communicated effectively with [employer]. Pt's barriers to d/c include decreased strength throughout, decreased balance, impaired hearing, impaired cognition including safety awareness, problem solving, attention, comprehension, and expression, and decreased activity tolerance; all affect independence in self care and fxl transfers. Pt would benefit from continued skilled OT services in order to address listed barriers and prepare for safe d/c.   Prognosis Fair   Problem List Decreased strength;Decreased endurance;Impaired balance;Decreased cognition;Impaired judgement;Decreased safety awareness;Impaired hearing;Decreased range of motion;Decreased coordination   Plan   Treatment/Interventions ADL retraining;Functional transfer training;LE strengthening/ROM;Therapeutic  exercise;Cognitive reorientation;Endurance training;Patient/family training;Equipment eval/education;Compensatory technique education;OT   Progress Progressing toward goals   OT Therapy Minutes   OT Time In 1336   OT Time Out 1400   OT Total Time (minutes) 24   OT Mode of treatment - Individual (minutes) 24

## 2024-03-08 NOTE — PROGRESS NOTES
03/08/24 0900   Pain Assessment   Pain Assessment Tool FLACC   Pain Score No Pain   Restrictions/Precautions   Precautions Aspiration;Bed/chair alarms;Cognitive;Fall Risk;Hard of hearing;Impulsive;Supervision on toilet/commode   Cognition   Overall Cognitive Status Impaired   Subjective   Subjective Patient without indications of needing bathroom nor of disagreement with participation in PT.  No verbalizations during session.  Intermittent vocalizations, however, no words intelligible during session.   Roll Left and Right   Comment Pt OOB   Sit to Lying   Type of Assistance Needed Supervision   Physical Assistance Level No physical assistance   Sit to Lying CARE Score 4   Lying to Sitting on Side of Bed   Comment Pt OOB   Sit to Stand   Type of Assistance Needed Physical assistance   Physical Assistance Level 25% or less   Comment CGA, pull to stand, does not follow manual or visual cues or hand over hand for sit to stand using push to stand this session.   Sit to Stand CARE Score 3   Bed-Chair Transfer   Type of Assistance Needed Physical assistance   Physical Assistance Level 25% or less   Comment min A, RW   Chair/Bed-to-Chair Transfer CARE Score 3   Transfer Bed/Chair/Wheelchair   Findings Patient demonstrates decreased environmental awareness this session.  PT needs to be directly in front of patient for patient to locate therapist and follow instructions.  Does not immediately respond to PT being in visual field despite tactile cues.   Car Transfer   Reason if not Attempted Environmental limitations   Car Transfer CARE Score 10   Walk 10 Feet   Type of Assistance Needed Physical assistance   Physical Assistance Level 25% or less   Comment RW   Walk 10 Feet CARE Score 3   Walk 50 Feet with Two Turns   Type of Assistance Needed Physical assistance   Physical Assistance Level 25% or less   Comment RW   Walk 50 Feet with Two Turns CARE Score 3   Walk 150 Feet   Type of Assistance Needed Physical assistance    Physical Assistance Level 25% or less   Comment RW   Walk 150 Feet CARE Score 3   Walking 10 Feet on Uneven Surfaces   Type of Assistance Needed Physical assistance   Physical Assistance Level 25% or less   Comment RW   Walking 10 Feet on Uneven Surfaces CARE Score 3   Ambulation   Primary Mode of Locomotion Prior to Admission Walk   Distance Walked (feet) 175 ft   Assist Device Roller Walker   Does the patient walk? 2. Yes   Wheel 50 Feet with Two Turns   Comment DNT   Wheel 150 Feet   Comment DNT   Curb or Single Stair   Style negotiated Single stair   Type of Assistance Needed Physical assistance   Physical Assistance Level 25% or less   Comment min A, B/L HRs   1 Step (Curb) CARE Score 3   4 Steps   Type of Assistance Needed Physical assistance   Physical Assistance Level 25% or less   Comment min A, B/L HRs.  7 steps completed, patient attempted to sit prematurely at bottom of steps despite visual, manual, and tactile cues.  Able to be redirected to complete task.   4 Steps CARE Score 3   12 Steps   Type of Assistance Needed Physical assistance   Physical Assistance Level 25% or less   Comment min A, B/L HRs, 14 steps, bilateral HRs   12 Steps CARE Score 3   Stairs   Type Stairs   # of Steps 14   Findings Pt without s/s of CRENSHAW or dizziness with negotiation of stairs.  Does require cues for continuation of activity visual, tactile throughout session.   Toilet Transfer   Type of Assistance Needed Physical assistance   Physical Assistance Level 25% or less   Comment Grab bar   Toilet Transfer CARE Score 3   Therapeutic Interventions   Strengthening Seated LE TE 1#, 3x10, red band for hip ABd.  Requires 1:1 manual, visual, and written/verbal cues   Assessment   Treatment Assessment Patient demonstrates increased fatigue, requires more frequent rest breaks this date.  Patient remains challenged with direction following, negotiation of environmental obstacles.  He is in need of ongoing interventions to maximize  return to OF and allow for safe return home with family support.   Problem List Decreased strength;Decreased endurance;Impaired balance;Decreased cognition;Impaired judgement;Decreased safety awareness;Impaired hearing;Decreased range of motion;Decreased coordination   PT Barriers   Physical Impairment Decreased strength;Decreased endurance;Impaired balance;Decreased mobility;Decreased cognition;Impaired judgement;Decreased safety awareness;Impaired hearing   Functional Limitation Car transfers;Ramp negotiation;Stair negotiation;Standing;Transfers;Walking   Plan   Treatment/Interventions Functional transfer training;LE strengthening/ROM;Elevations;Therapeutic exercise;Endurance training;Cognitive reorientation;Patient/family training;Equipment eval/education;Bed mobility;Gait training   Progress Progressing toward goals   PT Therapy Minutes   PT Time In 0900   PT Time Out 1000   PT Total Time (minutes) 60   PT Mode of treatment - Individual (minutes) 60   PT Mode of treatment - Concurrent (minutes) 0   PT Mode of treatment - Group (minutes) 0   PT Mode of treatment - Co-treat (minutes) 0   PT Mode of Treatment - Total time(minutes) 60 minutes   PT Cumulative Minutes 1281     Patient returned to bed, all needs in reach.  Alarm in place and activated.  Encouraged use of call bell, patient verbalizes understanding.

## 2024-03-08 NOTE — PLAN OF CARE
Problem: PAIN - ADULT  Goal: Verbalizes/displays adequate comfort level or baseline comfort level  Description: Interventions:  - Encourage patient to monitor pain and request assistance  - Assess pain using appropriate pain scale  - Administer analgesics based on type and severity of pain and evaluate response  - Implement non-pharmacological measures as appropriate and evaluate response  - Consider cultural and social influences on pain and pain management  - Notify physician/advanced practitioner if interventions unsuccessful or patient reports new pain  Outcome: Progressing     Problem: INFECTION - ADULT  Goal: Absence or prevention of progression during hospitalization  Description: INTERVENTIONS:  - Assess and monitor for signs and symptoms of infection  - Monitor lab/diagnostic results  - Monitor all insertion sites, i.e. indwelling lines, tubes, and drains  - Monitor endotracheal if appropriate and nasal secretions for changes in amount and color  - [place] appropriate cooling/warming therapies per order  - Administer medications as ordered  - Instruct and encourage patient and family to use good hand hygiene technique  - Identify and instruct in appropriate isolation precautions for identified infection/condition  Outcome: Progressing     Problem: SAFETY ADULT  Goal: Patient will remain free of falls  Description: INTERVENTIONS:  - Educate patient/family on patient safety including physical limitations  - Instruct patient to call for assistance with activity   - Consult OT/PT to assist with strengthening/mobility   - Keep Call bell within reach  - Keep bed low and locked with side rails adjusted as appropriate  - Keep care items and personal belongings within reach  - Initiate and maintain comfort rounds  - Make Fall Risk Sign visible to staff  - Offer Toileting every 2 Hours, in advance of need  - Initiate/Maintain bed/chair alarm  - Apply yellow socks and bracelet for high fall risk patients  -  Consider moving patient to room near nurses station  Outcome: Progressing     Problem: Prexisting or High Potential for Compromised Skin Integrity  Goal: Skin integrity is maintained or improved  Description: INTERVENTIONS:  - Identify patients at risk for skin breakdown  - Assess and monitor skin integrity  - Assess and monitor nutrition and hydration status  - Monitor labs   - Assess for incontinence   - Turn and reposition patient  - Assist with mobility/ambulation  - Relieve pressure over bony prominences  - Avoid friction and shearing  - Provide appropriate hygiene as needed including keeping skin clean and dry  - Evaluate need for skin moisturizer/barrier cream  - Collaborate with interdisciplinary team   - Patient/family teaching  - Consider wound care consult   Outcome: Progressing     Problem: Nutrition/Hydration-ADULT  Goal: Nutrient/Hydration intake appropriate for improving, restoring or maintaining nutritional needs  Description: Monitor and assess patient's nutrition/hydration status for malnutrition. Collaborate with interdisciplinary team and initiate plan and interventions as ordered.  Monitor patient's weight and dietary intake as ordered or per policy. Utilize nutrition screening tool and intervene as necessary. Determine patient's food preferences and provide high-protein, high-caloric foods as appropriate.     INTERVENTIONS:  - Monitor oral intake, urinary output, labs, and treatment plans  - Assess nutrition and hydration status and recommend course of action  - Evaluate amount of meals eaten  - Assist patient with eating if necessary   - Allow adequate time for meals  - Recommend/ encourage appropriate diets, oral nutritional supplements, and vitamin/mineral supplements  - Order, calculate, and assess calorie counts as needed  - Recommend, monitor, and adjust tube feedings and TPN/PPN based on assessed needs  - Assess need for intravenous fluids  - Provide specific nutrition/hydration  education as appropriate  - Include patient/family/caregiver in decisions related to nutrition  Outcome: Progressing

## 2024-03-08 NOTE — PROGRESS NOTES
03/08/24 0700   Pain Assessment   Pain Assessment Tool 0-10   Pain Score No Pain   Restrictions/Precautions   Precautions Aspiration;Bed/chair alarms;Cognitive;Fall Risk;Hard of hearing  ([place] removed last PM)   Weight Bearing Restrictions No   ROM Restrictions No   Eating   Type of Assistance Needed Supervision   Physical Assistance Level No physical assistance   Comment increased coughing this session as compared to previous sessions after approx 50% of swallows   Eating CARE Score 4   Oral Hygiene   Type of Assistance Needed Supervision   Physical Assistance Level No physical assistance   Oral Hygiene CARE Score 4   Grooming   Able To Comb/Brush Hair;Wash/Dry Face;Brush/Clean Teeth   Limitation Noted In Problem Solving;Safety;Sequencing   Shower/Bathe Self   Type of Assistance Needed Physical assistance   Physical Assistance Level 26%-50%   Comment assist for buttocks, bilat lower LE   Shower/Bathe Self CARE Score 3   Bathing   Assessed Bath Style Sponge Bath  (pt offered shower and declined)   Anticipated D/C Bath Style Shower;Sponge Bath   Able to Gather/Transport No   Able to Wash/Rinse/Dry (body part) Right Arm;Left Arm;R Upper Leg;L Upper Leg;Chest;Abdomen;Perineal Area   Limitations Noted in Balance;Endurance;Problem Solving;ROM;Sequencing;Safety   Positioning Seated;Standing   Tub/Shower Transfer   Limitations Noted In Balance;Endurance;ROM;Problem Solving;Safety;Sequencing;LE Strength   Adaptive Equipment Grab Bars;Seat with Back   Assessed Tub/shower combo   Findings pt and OT practiced tub-shower transfer using grab bars and in-shower seat which is what pt's family anticipates pt using at home; pt required reorientation to transfer procedure via demonstration by OT, then completed one transfer with Brayan and physical cues for safety in bilat LE and visual cues for hand placement on grab bars, pt attempted a second transfer trial however had increased difficulty sequencing LLE lifting in shower even  with physical cues and did not complete attempt   Upper Body Dressing   Type of Assistance Needed Physical assistance   Physical Assistance Level 26%-50%   Comment asisst to doff/don flannel shirt; physical cues to initiate doffing/donning t-shirt   Upper Body Dressing CARE Score 3   Lower Body Dressing   Type of Assistance Needed Physical assistance   Physical Assistance Level 76% or more   Comment able to assist in pulling clothing up from mid-shin to over knees; pt unable to pull clothing over hips/buttocks as he did yesterday even with significant visual and physical cueing   Lower Body Dressing CARE Score 2   Putting On/Taking Off Footwear   Type of Assistance Needed Physical assistance   Physical Assistance Level 76% or more   Comment able to present bilat feet to OT to doff/don socks   Putting On/Taking Off Footwear CARE Score 2   Dressing/Undressing Clothing   Remove UB Clothes Pullover Shirt;Jacket   Don UB Clothes Pullover Shirt;Jacket   Remove LB Clothes Pants;Undergarment;Socks   Don LB Clothes Pants;Undergarment;Socks   Limitations Noted In Balance;Endurance;Problem Solving;Safety;Sequencing;ROM   Positioning Supported Sit   Lying to Sitting on Side of Bed   Type of Assistance Needed Physical assistance   Physical Assistance Level 25% or less   Lying to Sitting on Side of Bed CARE Score 3   Sit to Stand   Type of Assistance Needed Physical assistance   Physical Assistance Level 25% or less   Sit to Stand CARE Score 3   Bed-Chair Transfer   Type of Assistance Needed Incidental touching   Comment CG   Chair/Bed-to-Chair Transfer CARE Score 4   Transfer Bed/Chair/Wheelchair   Limitations Noted In Balance;Endurance;Problem Solving;Sequencing;LE Strength   Adaptive Equipment Roller Walker   Toileting Hygiene   Type of Assistance Needed Physical assistance   Physical Assistance Level Total assistance   Comment initially incontinent of bowels, then pt reported need to use bathroom; total assist to manage  hygiene and clothing management   Toileting Hygiene CARE Score 1   Toileting   Able to Pull Clothing down no, up no.   Able to Manage Clothing Closures No   Manage Hygiene Bladder;Bowel   Limitations Noted In Balance;Problem Solving;ROM;Safety;Sequencing;LE Strength   Adaptive Equipment Grab Bar   Findings pt did not appear to be aware of when bowel movement was occurring and pulled cord to get off toilet many times while active bowel movement was happening; OT redirected pt to active bowel movements each time   Toilet Transfer   Type of Assistance Needed Physical assistance   Physical Assistance Level 25% or less   Toilet Transfer CARE Score 3   Toilet Transfer   Surface Assessed Raised Toilet   Transfer Technique Standard   Limitations Noted In Balance;Endurance;Problem Solving;Safety;Sequencing;LE Strength   Adaptive Equipment Grab Bar;Walker   Cognition   Overall Cognitive Status Impaired   Arousal/Participation Alert;Cooperative   Attention Attends with cues to redirect   Orientation Level Oriented to person;Oriented to place;Oriented to time   Memory Decreased short term memory;Decreased recall of precautions   Following Commands Follows one step commands with increased time or repetition   Assessment   Treatment Assessment Pt agreeable to OT session this AM. Received lying supine in bed. ADL session completed; current LOF and details listed in respective sections. Pt is overall maxA toileting and LB dressing, Brayan bathing and UB dressing, supervision grooming and eating; fxl transfers overall Brayan. Pt required more frequent cueing this session for sequencing and initiating tasks and had a decreased success rate even with cues as compared to previous sessions. Shower transfer practice had more difficulty this session as well. Increased coughing noted with breakfast tray and cues to swallow again to clear throat. RN notified of pt's status.  Pt's barriers to d/c include decreased strength throughout, decreased  balance, impaired hearing, impaired cognition including safety awareness, problem solving, attention, comprehension, and expression, and decreased activity tolerance; all affect independence in self care and fxl transfers. Pt would benefit from continued skilled OT services in order to address listed barriers and prepare for safe d/c.   Prognosis Fair   Problem List Decreased strength;Decreased endurance;Impaired balance;Decreased cognition;Impaired judgement;Decreased safety awareness;Impaired hearing;Decreased range of motion;Decreased coordination   Plan   Treatment/Interventions ADL retraining;Functional transfer training;LE strengthening/ROM;Therapeutic exercise;Endurance training;Cognitive reorientation;Patient/family training;Equipment eval/education;Compensatory technique education;OT   Progress Progressing toward goals   OT Therapy Minutes   OT Time In 0700   OT Time Out 0832   OT Total Time (minutes) 92   OT Mode of treatment - Individual (minutes) 92

## 2024-03-08 NOTE — PLAN OF CARE
Problem: PAIN - ADULT  Goal: Verbalizes/displays adequate comfort level or baseline comfort level  Description: Interventions:  - Encourage patient to monitor pain and request assistance  - Assess pain using appropriate pain scale  - Administer analgesics based on type and severity of pain and evaluate response  - Implement non-pharmacological measures as appropriate and evaluate response  - Consider cultural and social influences on pain and pain management  - Notify physician/advanced practitioner if interventions unsuccessful or patient reports new pain  Outcome: Progressing     Problem: INFECTION - ADULT  Goal: Absence or prevention of progression during hospitalization  Description: INTERVENTIONS:  - Assess and monitor for signs and symptoms of infection  - Monitor lab/diagnostic results  - Monitor all insertion sites, i.e. indwelling lines, tubes, and drains  - Monitor endotracheal if appropriate and nasal secretions for changes in amount and color  - [place] appropriate cooling/warming therapies per order  - Administer medications as ordered  - Instruct and encourage patient and family to use good hand hygiene technique  - Identify and instruct in appropriate isolation precautions for identified infection/condition  Outcome: Progressing  Goal: Absence of fever/infection during neutropenic period  Description: INTERVENTIONS:  - Monitor WBC    Outcome: Progressing

## 2024-03-08 NOTE — PROGRESS NOTES
Progress Note - Thomas Chase 83 y.o. male MRN: [id number]    Unit/Bed#: [facility] 213-02 Encounter: [id number]        Subjective:   Patient seen and examined at bedside after reviewing the chart and discussing the case with the caring staff.      Patient examined at bedside in presence of patient's wife.  Patient's brooks catheter removed yesterday 3/7/24 and has needed 2 straight caths since then.  Staff also had concerns this morning that patient's bilateral feet appeared purple.  Patient evaluated later in morning and this seems resolved now.  No complaints of pain.    Physical Exam   Vitals: Blood pressure 91/59, pulse 96, temperature 98.1 °F (36.7 °C), temperature source Temporal, resp. rate 17, height 6' (1.829 m), weight 59.4 kg (130 lb 15.3 oz), SpO2 94%.,Body mass index is 17.76 kg/m².  Constitutional: Patient in no acute distress.  HEENT: PERR, EOMI, MMM.  Cardiovascular: Normal rate and regular rhythm.    Pulmonary/Chest: Effort normal and breath sounds normal.   Abdomen: Soft, + BS, NT.  Bilateral feet:  Skin appears pink, warm, DP pulses 2+, no swelling    Assessment/Plan:  Thomas Chase is a(n) 83 y.o. year old male with left SDH and right SAH.     1.  Cardiac with hx of HTN, HLD.  On pravastatin 40 mg daily (simvastatin nonformulary).  Continue to monitor BP.  2.  Allergic rhinitis.  Patient is on loratadine 10 mg daily.  3.  Depression/anxiety.  Patient is on Zoloft 25 mg daily.  4.  Insomnia.  Patient is on melatonin 6 mg at bedtime.  5.  Thrush.  Nystatin liquid swish and swallow 4 times daily.  6.  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  Patient is mainly nonverbal.  7.  Urinary retention.  Urinary retention protocol.  Started on Flomax 0.4 mg daily 2/21/24.  Urology consulted.  Brooks catheter placed 2/22/24, removed on 2/28/24, placed again 3/1/24, removed again 3/7/24.    8.  Dysphagia.  Speech therapy following.  Dysphagia 1 puureed with nectar thick liquid.  9.  Pain and  bowel management.  As per physiatrist.  10.  Intracranial hemorrhage.   Patient will be receiving intensive PT OT ST as per physiatrist.    Anticipated discharge date:  Thursday, 3/14/24  PCP:  RONY Diaz    The patient was discussed with Dr. Paz and he is in agreement with the above note.

## 2024-03-08 NOTE — PROGRESS NOTES
03/08/24 1130   Pain Assessment   Pain Assessment Tool FLACC   Pain Score No Pain   Restrictions/Precautions   Precautions Aspiration;Bed/chair alarms;Cognitive;Fall Risk;Hard of hearing;Supervision on toilet/commode;Impulsive   Weight Bearing Restrictions No   ROM Restrictions No   Cognitive Linguisitic Assessments   Cognitive Linquistic Quick Test (CLQT) BCAT 8/21   Comprehension   Assist Devices Hearing Aid   QI: Comprehension 2. Sometimes Understands: Understands only basic conversations or simple, direct phrases. Frequently requires cues to understand   Comprehension (FIM) 2 - Understands basic info/conversation 25-49% of time   Expression   Non-Verbal Basic   QI: Expression 2. Frequently exhibits difficulty with expressing needs and ideas   Expression (FIM) 2 - Understands basic info/conversation 25-49% of time   Social Interaction   Social Interaction (FIM) 2 - Interacts appropriately 25-49% of time   Problem Solving   Problem solving (FIM) 2 - Solves basic problems 25-49% of time   Memory   Memory (FIM) 2 - Recognizes, recalls/performs 25-49%   Memory Skills   Orientation Level Oriented to person;Oriented to place   Consistencies Assessed and Performance   Materials Admnistered Nectar thick liquid;Puree/Level 1   Oral Stage Mild impaired   Phargngeal Stage Moderate impaired   Swallow Mechanics Mild delayed;Swallow initation   Strategies and Efficacy small bites, small sips, slow rate   Recommendations   Risk for Aspiration Present   Recommendations Consider oral diet   Diet Solid Recommendation Level 1 Dysphagia/pureed   Diet Liquid Recommendation Nectar thick liquid   Recommended Form of Meds Crushed;With puree   General Precautions Aspiration precautions   Compensatory Swallowing Strategies Alternate solids and liquids   Results Reviewed with PT/Family/Caregiver   Eating   Type of Assistance Needed Supervision   Physical Assistance Level No physical assistance   Comment Pt on 3/8 required far less  cueing for alt and secondary swallow, pt self feeding, high consumption   Eating CARE Score 4   Swallow Assessment   Swallow Treatment Assessment Pt was assessed today with lunch tray of puree items and NTL.  Pt demonstrating larger bites but does appear to recall secondary swallow, alt bites/sips, slow rate.  Patient performed very well overall, less overt s/s during today's session.  If coughing begins pt is cued to swallow and coughing ends.  Liquid wash appears helpful to pt.  Spouse able to verbalize all strategies and cues appropriately.  Pt with most independent demonstration of self feed today during lunch.   SLP Therapy Minutes   SLP Time In 1130   SLP Time Out 1230   SLP Total Time (minutes) 60   SLP Mode of treatment - Individual (minutes) 60   SLP Mode of treatment - Concurrent (minutes) 0   SLP Mode of treatment - Group (minutes) 0   SLP Mode of treatment - Co-treat (minutes) 0   SLP Mode of Treatment - Total time(minutes) 60 minutes   SLP Cumulative Minutes 690   Therapy Time missed   Time missed? No

## 2024-03-08 NOTE — PROGRESS NOTES
PM&R PROGRESS NOTE:  Thomas Chase 83 y.o. male MRN: [id number]  Unit/Bed#: -02 Encounter: [id number]        **CHANGE IN REHAB DIAGNOSIS FROM PRE-ADMISSION SCREEN  Rehab Diagnosis: Impairment of mobility, safety, Activities of Daily Living (ADLs), and cognitive/communication skills due to Brain Dysfunction:  02.22  Traumatic, Closed Injury  Etiologic Diagnosis: L SDH, R SAH, L anterior hypodensity lateral temporal lobe    HPI: Thomas Chase is a 83 y.o. male with past medical history significant for previous fall with resultant traumatic brain injury-subdural hematoma in 2022 status post left craniotomy, chronic aphasia, hard of hearing with cochlear implant in place, hyperlipidemia who presented to the [facility] on 2/13/2024 with increased weakness and mental status change for several days.  Unclear if patient had head trauma.  CT head showing a 3 mm left frontal subdural hemorrhage.  CT lumbar spine showing DJD.  Patient found to have dysphagia and was seen by speech therapy.  VFSS on 2/15/2024 cleared him for a puréed diet with thin liquids.  Patient was also started by psychiatry for depression and started on Zoloft 25 mg daily.  Patient sustained a rapid response and a unwitnessed fall with head trauma notable bump on head on 2/15/2024.  CT head showing a new small volume acute subarachnoid hemorrhage in the left frontal opercular region as well as new nondisplaced fracture of the zygomatic arch with soft tissue contusion, stable 3 mm subdural hemorrhage seen previously.  Patient transferred to [facility] for further evaluation.  Patient seen by neurology and neurosurgery.  CTA head and neck showing a left anterior hypodensity in the left anterior lateral temporal lobe with 2 punctate hyperdensities.  These represented possible nonhemorrhagic contusions or venous infarcts.  Less likely to represent ischemia or neoplasm.  Follow-up imaging recommended with CT head in 2-3 weeks.  CT  venogram showing patent dural venous sinuses.  Patient was cleared for DVT prophylaxis and started on Keppra for seizure prophylaxis.    Medical course complicated by suspected UTI based on UA and symptoms.  Patient was treated with antibiotics x 3 days.  After medical stabilization, patient found to have acute functional deficits from his baseline in mobility, self-care, speech swallow cognition therefore admitted to [facility] for acute inpatient rehabilitation.    SUBJECTIVE: Patient seen face-to-face.  Unfortunately has failed his voiding trial again -still having larger volumes of retention.  Straight catheterizations are very uncomfortable for patient additionally.  Finley catheter to be replaced later today.      ASSESSMENT: Stable, progressing      PLAN:    Rehabilitation  Functional deficits: Aphasia, dysphagia, impaired cognition, impaired balance, impulsivity, poor safety awareness impaired mobility, self care    Continue current rehabilitation plan of care to maximize function.    Expected Discharge: Tuesday 3/12/24 with home care RN, PT, OT, SLP, Aide       Current Function:  Physical Therapy Occupational Therapy Speech Therapy   Weight Bearing Status: Weight Bearing as Tolerated  Transfers: Incidental Touching, Supervision (cg/close S)  Bed Mobility: Incidental Touching, Supervision (cg/close S)  Amulation Distance (ft): 180 feet  Ambulation: Incidental Touching, Supervision (cg/close S)  Assistive Device for Ambulation: Roller Walker  Wheelchair Mobility Distance: 133 ft  Wheelchair Mobility: Maximum Assistance (max visual and tactile cues)  Number of Stairs: 14  Assistive Device for Stairs: Bilateral Hand Rails  Stair Assistance:  (cg/close S)  Ramp: Incidental Touching  Assistive Device for Ramp: Roller Walker  Discharge Recommendations: Home with:  DC Home with:: 24 Hour Assisteance, Family Support, First Floor Setup, Home Physical Therapy   Eating: Minimal Assistance  Grooming:  Supervision  Bathing: Minimal Assistance  Bathing: Minimal Assistance  Upper Body Dressing: Minimal Assistance  Lower Body Dressing: Maximum Assistance  Toileting: Maximum Assistance  Tub/Shower Transfer:  (TBA)  Toilet Transfer: Incidental Touching, Minimal Assistance  Cognition: Exceptions to WNL  Cognition: Decreased Memory, Decreased Executive Functions, Decreased Attention, Decreased Comprehension  Orientation: Person, Place, Time   Mode of Communication: Non-verbal  Speech/Language: Expressive Aphasia  Cognition: Exceptions to WNL  Cognition: Decreased Memory, Decreased Attention, Decreased Executive Functions, Decreased Comprehension, Decreased Safety  Orientation: Person, Place, Time  Swallowing: Exceptions to WNL  Swallowing: Oral Dysphagia, Pharyngeal Dysphagia  Diet Recommendations: Level 1/Puree, [place] Thick  Discharge Recommendations: Home with:  DC Home with:: 24 Hour Supervision, Home Speech Therapy, Family Support         DVT prophylaxis  Continue Lovenox      * Intracranial hemorrhage (HCC)  Assessment & Plan  Initial presentation on 2/13/2024 with increased weakness in the legs, mental status changes for several days  CT head showing a 3 mm left frontal subdural hemorrhage  Rapid response 2/15/2024 with fall and head trauma  CT head showing a new small volume acute subarachnoid hemorrhage in the left frontal opercular region and new nondisplaced fracture of the zygomatic arch with soft tissue contusion.    Transferred to [facility]  CTA head and neck and and repeat CT head showing a left anterior hypodensity in the left anterior lateral temporal lobe with 2 punctate hyperdensities.  Differential diagnosis nonhemorrhagic contusions or venous infarcts.  Patient seen by neurology and neurosurgery  Conservative management  Cleared for DVT prophylaxis  Started on Keppra for seizure prophylaxis  Repeat CT head scheduled on Monday, 3/4/2024 -personally reviewed  Radiology read as follows:  Improving left  anterior temporal lobe contusions with resolving edema. Stable subacute subdural hemorrhage along the anterior left frontal convexity. Resolved subarachnoid and intraventricular hemorrhages. Unchanged minimal hyperdensity beneath the left craniotomy flap. Stable right parafalcine subdural hygroma. There is no new intra or extra-axial hemorrhage.  Follow-up with neurosurgery on 3/6/2024 virtually completed.  Recommendations as follows: Neurosurgery will sign off, patient will follow-up in 2 weeks with repeat CT head   Follow-up with neurosurgery and neurology in outpatient clinic.    Behavior safety risk  Assessment & Plan  Patient demonstrating mild impulsivity in acute care  Fall x 2 in acute care  Falls at home in the past    Safety behavior plan going well and ARC.  No further impulsivity  Nursing and staff to provide close supervision near nursing station  Patient placed on 4 side rails (not as a restraint, patient and wife preference)  Monitor sleep-wake cycle  Avoid overstimulation: 1 visitor at a time, low lights, low sounds      Hematuria  Assessment & Plan  Started 3/2 - 3/3/2024  Minor, and more likely related to irritation from Finley catheter  Irrigation as needed per urology  Asymptomatic  H&H stable    Headache  Assessment & Plan  Resolved  Continue Tylenol 650 mg PRN     Severe protein-calorie malnutrition (HCC)  Assessment & Plan  BMI 17  Nutrition following  Optimize oral intake  Supplements ordered    Dysphagia  Assessment & Plan  VFSS completed 2/15/2024  Cleared for a puréed diet with nectar thick liquids  SLP to follow while at [facility]  Repeat VFSS order placed    Urinary retention  Assessment & Plan  Finley catheter removed on [facility]  Trial of void underway  Mixed pattern  Unfortunately required multiple straight catheterizations  Finley catheter replaced 3/1/24 per urology  Flomax increased to 0.8 mg every afternoon per urology  Trial of void in 5-7 days again.    Trial of void 3/7/2024.     Unfortunately was not able to void to well spontaneously  with continued large volume urinary retention requiring 3 straight catheterizations which was very uncomfortable for patient.  Re-insert Finley catheter today and likely will require a longer period of time prior to next removal and follow-up with urology as an outpatient      Constipation due to neurogenic bowel  Assessment & Plan  Patient started on more aggressive bowel program yesterday  Good BM 3/7/2024 and 3/8/2024    Hearing loss  Assessment & Plan  Chronic hearing loss  Patient status post cochlear implant  MRI contraindicated    Hypercholesterolemia  Assessment & Plan  Continue simvastatin 20 mg daily (home dose)    UTI (urinary tract infection)-resolved as of 2/21/2024  Assessment & Plan  Suspected UTI based on UA and symptoms in acute care  Completed 3 days IV antibiotics        Appreciate IM consultants medical co-management.  Labs, medications, and imaging personally reviewed.      ROS:  A ten point review of systems was completed on 03/08/24 and pertinent positives are listed in subjective section. All other systems reviewed were negative.       OBJECTIVE:   BP 91/59 (BP Location: Right arm)   Pulse 96   Temp 98.1 °F (36.7 °C) (Temporal)   Resp 17   Ht 6' (1.829 m)   Wt 59.4 kg (130 lb 15.3 oz)   SpO2 94%   BMI 17.76 kg/m²     Physical Exam  Vitals and nursing note reviewed.   Constitutional:       General: He is not in acute distress.     Appearance: He is well-developed.   HENT:      Head: Normocephalic.      Ears:      Comments: Impaired hearing     Nose: Nose normal.   Eyes:      Conjunctiva/sclera: Conjunctivae normal.   Pulmonary:      Effort: Pulmonary effort is normal.      Breath sounds: No wheezing.   Abdominal:      General: There is no distension.      Palpations: Abdomen is soft.   Musculoskeletal:         General: No swelling.      Cervical back: Neck supple.   Skin:     General: Skin is warm.   Neurological:      Mental  Status: He is alert and oriented to person, place, and time.      Motor: Weakness (generalized) present.   Psychiatric:         Mood and Affect: Mood normal.          Lab Results   Component Value Date    WBC 5.31 03/04/2024    HGB 12.5 03/04/2024    HCT 38.5 03/04/2024    MCV 98 03/04/2024     (H) 03/04/2024     Lab Results   Component Value Date    SODIUM 140 03/04/2024    K 4.3 03/04/2024     03/04/2024    CO2 28 03/04/2024    BUN 21 03/04/2024    CREATININE 0.58 (L) 03/04/2024    GLUC 86 03/04/2024    CALCIUM 8.8 03/04/2024     Lab Results   Component Value Date    INR 1.02 06/02/2022    INR 1.10 05/29/2022    INR 1.04 05/28/2022    PROTIME 13.0 06/02/2022    PROTIME 13.7 05/29/2022    PROTIME 13.2 05/28/2022           Current Facility-Administered Medications:     acetaminophen (TYLENOL) tablet 650 mg, 650 mg, Oral, Q6H PRN, Daniel Clayton MD, 650 mg at 03/03/24 2140    bisacodyl (DULCOLAX) rectal suppository 10 mg, 10 mg, Rectal, Daily PRN, Darwin Paz MD    enoxaparin (LOVENOX) subcutaneous injection 40 mg, 40 mg, Subcutaneous, Q24H JEANNIE, Alexandrea Lugo MD, 40 mg at 03/08/24 0840    lactulose (CHRONULAC) oral solution 20 g, 20 g, Oral, BID PRN, Darwin Paz MD, 20 g at 03/05/24 2107    loratadine (CLARITIN) tablet 10 mg, 10 mg, Oral, Daily, Amada L Dagnall, PA-C, 10 mg at 03/08/24 0841    meclizine (ANTIVERT) tablet 25 mg, 25 mg, Oral, Q8H PRN, Amada L Dagnall, PA-C    melatonin tablet 6 mg, 6 mg, Oral, HS, Amada L Dagnall, PA-C, 6 mg at 03/07/24 2103    multivitamin-minerals (CENTRUM) tablet 1 tablet, 1 tablet, Oral, Daily, Amada Epperson, PA-C, 1 tablet at 03/08/24 0841    nystatin (MYCOSTATIN) oral suspension 500,000 Units, 500,000 Units, Swish & Swallow, 4x Daily, Darwin Paz MD, 500,000 Units at 03/08/24 1250    polyethylene glycol (MIRALAX) packet 17 g, 17 g, Oral, Daily PRN, Ashley Depadua, MD, 17 g at 03/05/24 1740    pravastatin (PRAVACHOL) tablet 40 mg, 40 mg, Oral,  Daily With Dinner, Amada ROSA Rodriguezl, PA-C, 40 mg at 03/07/24 1712    senna (SENOKOT) tablet 8.6 mg, 1 tablet, Oral, HS, Alexandrea Lugo MD, 8.6 mg at 03/07/24 2103    sertraline (ZOLOFT) tablet 25 mg, 25 mg, Oral, Daily, Amada LEE Dagnall, PA-C, 25 mg at 03/08/24 0841    tamsulosin (FLOMAX) capsule 0.8 mg, 0.8 mg, Oral, Daily With Dinner, Norberto Boyer MD, 0.8 mg at 03/07/24 1711    Past Medical History:   Diagnosis Date    Arthritis     Encounter for general adult medical examination without abnormal findings 03/20/2019    Fall 11/03/2022    Hyperlipidemia     Macular degeneration, wet (HCC)     Urinary retention 06/02/2022       Patient Active Problem List    Diagnosis Date Noted    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    Behavior safety risk 02/20/2024    Hematuria 03/04/2024    Headache 02/20/2024    Bilateral lower extremity weakness 02/17/2024    Abnormal CT scan, lumbar spine 02/15/2024    Severe protein-calorie malnutrition (HCC) 02/14/2024    Debility 02/13/2024    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    Dysphagia 11/21/2023    Macular degeneration, age related 02/27/2023    Traumatic subdural hemorrhage with loss of consciousness of unspecified duration, initial encounter ([facility]) 02/27/2023    Sensorineural hearing loss (SNHL), bilateral 08/18/2022    Balance disorder 06/28/2022    Abnormal echocardiogram 06/27/2022    Right bundle-branch block 06/27/2022    Moderate protein-calorie malnutrition (HCC) 06/19/2022    Hypotension 06/19/2022    Diastolic dysfunction 06/19/2022    EKG abnormalities 06/19/2022    Constipation due to neurogenic bowel 06/02/2022    Urinary retention 06/02/2022    SDH (subdural hematoma) ([facility]) 05/27/2022    Primary osteoarthritis of left knee 05/17/2022    Hygroma 04/26/2022    Right hand pain     Carpal tunnel syndrome on right     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    Overweight (BMI 25.0-29.9) 01/27/2021    Negative depression screening 09/26/2019     Hypercholesterolemia 07/12/2018    Borderline hypertension 07/12/2018    Hearing loss 04/08/2009          Alexandrea Lugo MD    I have spent a total time of 37 minutes on 03/08/24 in caring for this patient including Patient and family education, Impressions, Documenting in the medical record, and Communicating with other healthcare professionals .      ** Please Note:  voice to text software may have been used in the creation of this document. Although proof errors in transcription or interpretation are a potential of such software**

## 2024-03-08 NOTE — NURSING NOTE
Per OT, patient voided very small amount of urine in toilet in am. At 1330, patient assisted to commode over toilet to attempt to void. Continent of small bm on toilet, unable to void. Bladder scanned patient for 622 ml urine. Straight cath patient for 325 ml clear tiana urine. Patient with facial grimacing and appeared in pain during straight cath. Nursing and wife assisted patient with encouragement to drink fluids. PM+R notified. Order for brooks patient. Patient wife presently requested to PM+R and nursing to allow more time and further attempts for patient to void this evening prior to inserting brooks. Patient ate supper with supervision and assist of wife. Patient sat on toilet to attempt to void again. No urine present in measuring urine hat. Will attempt another void and bladder scan. Patient presently resting in bed, bed alarm in place for safety. Call bell within reach.

## 2024-03-08 NOTE — NURSING NOTE
Pt. Unable to void x 2 during my shift. Bladder scans above 350ml resulting in having to S/C pt. X 2. He only had small smear of stool in depends at [place] but was inc. Small amount of urine in depends at 0430 but Bladder scan was 407ml.

## 2024-03-09 PROCEDURE — 97116 GAIT TRAINING THERAPY: CPT

## 2024-03-09 PROCEDURE — 92526 ORAL FUNCTION THERAPY: CPT | Performed by: NURSE PRACTITIONER

## 2024-03-09 PROCEDURE — 97110 THERAPEUTIC EXERCISES: CPT

## 2024-03-09 PROCEDURE — 97530 THERAPEUTIC ACTIVITIES: CPT

## 2024-03-09 PROCEDURE — 97112 NEUROMUSCULAR REEDUCATION: CPT

## 2024-03-09 PROCEDURE — 97535 SELF CARE MNGMENT TRAINING: CPT

## 2024-03-09 RX ADMIN — NYSTATIN 500000 UNITS: 100000 SUSPENSION ORAL at 12:30

## 2024-03-09 RX ADMIN — TAMSULOSIN HYDROCHLORIDE 0.8 MG: 0.4 CAPSULE ORAL at 17:24

## 2024-03-09 RX ADMIN — NYSTATIN 500000 UNITS: 100000 SUSPENSION ORAL at 17:24

## 2024-03-09 RX ADMIN — Medication 1 TABLET: at 08:41

## 2024-03-09 RX ADMIN — SENNOSIDES 8.6 MG: 8.6 TABLET, FILM COATED ORAL at 21:01

## 2024-03-09 RX ADMIN — ENOXAPARIN SODIUM 40 MG: 40 INJECTION SUBCUTANEOUS at 08:41

## 2024-03-09 RX ADMIN — Medication 6 MG: at 21:01

## 2024-03-09 RX ADMIN — LORATADINE 10 MG: 10 TABLET ORAL at 08:41

## 2024-03-09 RX ADMIN — ACETAMINOPHEN 650 MG: 325 TABLET ORAL at 20:09

## 2024-03-09 RX ADMIN — NYSTATIN 500000 UNITS: 100000 SUSPENSION ORAL at 21:01

## 2024-03-09 RX ADMIN — NYSTATIN 500000 UNITS: 100000 SUSPENSION ORAL at 08:41

## 2024-03-09 RX ADMIN — SERTRALINE HYDROCHLORIDE 25 MG: 25 TABLET ORAL at 08:41

## 2024-03-09 RX ADMIN — PRAVASTATIN SODIUM 40 MG: 40 TABLET ORAL at 17:24

## 2024-03-09 NOTE — PROGRESS NOTES
03/09/24 1130   Pain Assessment   Pain Assessment Tool 0-10   Pain Score No Pain   Pain Rating: FLACC (Rest) - Face 0   Pain Rating: FLACC (Rest) - Legs 0   Pain Rating: FLACC (Rest) - Activity 0   Pain Rating: FLACC (Rest) - Cry 0   Pain Rating: FLACC (Rest) - Consolability 0   Score: FLACC (Rest) 0   Restrictions/Precautions   Precautions Bed/chair alarms;Aspiration;Aphasia;Cognitive;Fall Risk;Hard of hearing;Finley;Impulsive;Supervision on toilet/commode;Visual deficit   Comprehension   Assist Devices Hearing Aid   Comprehension (FIM) 2 - Understands basic info/conversation 25-49% of time   Expression   Expression (FIM) 2 - Understands basic info/conversation 25-49% of time   Social Interaction   Social Interaction (FIM) 2 - Interacts appropriately 25-49% of time   Problem Solving   Problem solving (FIM) 2 - Bed alarm for safety issues more than half the time   Memory   Memory (FIM) 2 - Recognizes, recalls/performs 25-49%   Recommendations   Diet Solid Recommendation Level 1 Dysphagia/pureed   Diet Liquid Recommendation Nectar thick liquid  (10CC brown lid provalve cup. Can have water only via 5CC blue provalve cup)   Recommended Form of Meds Crushed;With puree   General Precautions Upright as possible for all oral intake;Minimize distractions;Supervision with meals;Remain upright for 45 mins after meals;Aspiration precautions   Compensatory Swallowing Strategies Alternate solids and liquids  (small bites/sips, slow rate, double swallow)   Results Reviewed with PT/Family/Caregiver   Eating   Type of Assistance Needed Supervision;Incidental touching  (visual cues)   Physical Assistance Level No physical assistance   Eating CARE Score 4   Swallow Assessment   Swallow Treatment Assessment Pt with decreased visual attention to cues on board, decreased completion of double and effortful swallow vs. previous session. Variable cough remains thorughout meal across all consistencies. Pt continues to medically tolerate  current diet. Pending VBS on 3/11/24 to determine most appropriate diet and determine progress vs. baseline diet. Last VBS was 2/14/24.   SLP Therapy Minutes   SLP Time In 1130   SLP Time Out 1230   SLP Total Time (minutes) 60   SLP Mode of treatment - Individual (minutes) 60   SLP Mode of treatment - Concurrent (minutes) 0   SLP Mode of treatment - Group (minutes) 0   SLP Mode of treatment - Co-treat (minutes) 0   SLP Mode of Treatment - Total time(minutes) 60 minutes   SLP Cumulative Minutes 750

## 2024-03-09 NOTE — PROGRESS NOTES
Progress Note - Thomas Chase 83 y.o. male MRN: [id number]    Unit/Bed#: [facility] 213-02 Encounter: [id number]        Subjective:   Patient seen and examined at bedside after reviewing the chart and discussing the case with the caring staff.      Patient examined at bedside in presence of patient's wife.  Patient failed voiding trial and Finley's catheter was placed in.    Physical Exam   Vitals: Blood pressure 138/63, pulse 82, temperature 97.7 °F (36.5 °C), temperature source Temporal, resp. rate 19, height 6' (1.829 m), weight 59.4 kg (130 lb 15.3 oz), SpO2 98%.,Body mass index is 17.76 kg/m².  Constitutional: Patient in no acute distress.  HEENT: PERR, EOMI, MMM.  Cardiovascular: Normal rate and regular rhythm.    Pulmonary/Chest: Effort normal and breath sounds normal.   Abdomen: Soft, + BS, NT.  Bilateral feet:  Skin appears pink, warm, DP pulses 2+, no swelling    Assessment/Plan:  Thomas Chase is a(n) 83 y.o. year old male with left SDH and right SAH.     1.  Cardiac with hx of HTN, HLD.  On pravastatin 40 mg daily (simvastatin nonformulary).  Continue to monitor BP.  2.  Allergic rhinitis.  Patient is on loratadine 10 mg daily.  3.  Depression/anxiety.  Patient is on Zoloft 25 mg daily.  4.  Insomnia.  Patient is on melatonin 6 mg at bedtime.  5.  Thrush.  Nystatin liquid swish and swallow 4 times daily.  6.  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  Patient is mainly nonverbal.  7.  Urinary retention.  Urinary retention protocol.  Started on Flomax 0.4 mg daily 2/21/24.  Urology consulted.  Finley catheter placed 2/22/24, removed on 2/28/24, placed again 3/1/24, removed again 3/7/24.    8.  Dysphagia.  Speech therapy following.  Dysphagia 1 puureed with nectar thick liquid.  9.  Pain and bowel management.  As per physiatrist.  10.  Intracranial hemorrhage.   Patient will be receiving intensive PT OT ST as per physiatrist.    Anticipated discharge date:  Thursday, 3/14/24  PCP:  Jesenia HALL  RONY Badillo

## 2024-03-09 NOTE — NURSING NOTE
Pt resting in bed, no s/s of pain or distress. Finley intact draining to gravity. Transfers min assist. Brief on for protection. NTL, tolerating. Intermittent spontaneous cough present. Continuing to monitor and follow plan of care. Bed alarm in place for safety.

## 2024-03-09 NOTE — PROGRESS NOTES
OT Daily Progress       03/09/24 0830   Pain Assessment   Pain Assessment Tool 0-10   Pain Score No Pain   Eating   Type of Assistance Needed Supervision   Physical Assistance Level No physical assistance   Eating CARE Score 4   Oral Hygiene   Type of Assistance Needed Set-up / clean-up;Supervision   Physical Assistance Level No physical assistance   Comment appiled toothpaste to toothbrush. Brushes independently after setup   Oral Hygiene CARE Score 4   Sit to Stand   Type of Assistance Needed Physical assistance   Physical Assistance Level 25% or less   Comment min A   Sit to Stand CARE Score 3   Therapeutic Excerise-Strength   UE Strength Yes   Cognition   Overall Cognitive Status Impaired   Attention Attends with cues to redirect   Following Commands Follows one step commands with increased time or repetition   Activity Tolerance   Activity Tolerance Patient limited by fatigue  (decreased communication)   Assessment   Treatment Assessment Pt seen for follow up visit. Pt up in wheelchair, awake, alert. Eating breakfast with supervision of staff. Pt continued to eat breakfast during beginning part of session. Completes teeth brushing with setup of materials. Cues for thoroughness. To OT gym for UE endurance and strengthening exercises relative to self care tasks post breakfast. Arm bike intermittent forward and back for a duration of approx 8 minutes requiring cues to continue exercise. 7# digiflex x 30-40 reps bilat hands with tactile cueing to start/terminate ex. Yellow T bar for flex/ext x 30 reps. Pt returned to PT gym at end of session, needs in reach. Mamie fair. Pt pointing to bed throughout session, most communication through white board - unsure of full comprehesion. Minimal verbalizations throughout. Will continue to benefit from OT to max I and safety with self care prior to discharge. Progress treatment as status allows.   OT Therapy Minutes   OT Time In 0830   OT Time Out 0930   OT Total Time (minutes)  60   OT Mode of treatment - Individual (minutes) 60   Therapy Time missed   Time missed? No

## 2024-03-09 NOTE — PROGRESS NOTES
03/09/24 0930   Pain Assessment   Pain Assessment Tool 0-10   Pain Score No Pain   Restrictions/Precautions   Precautions Aspiration;Bed/chair alarms;Hard of hearing   Weight Bearing Restrictions No   ROM Restrictions No   Cognition   Overall Cognitive Status Impaired   Arousal/Participation Alert;Cooperative   Attention Attends with cues to redirect   Orientation Level Oriented to person;Oriented to place;Oriented to time   Memory Decreased short term memory;Decreased recall of precautions   Following Commands Follows one step commands with increased time or repetition   Sit to Stand   Type of Assistance Needed Incidental touching   Physical Assistance Level 25% or less   Sit to Stand CARE Score 3   Bed-Chair Transfer   Type of Assistance Needed Incidental touching   Physical Assistance Level 25% or less   Chair/Bed-to-Chair Transfer CARE Score 3   Transfer Bed/Chair/Wheelchair   Stand Pivot Contact Guard   Sit to Stand Contact Guard   Stand to Sit Contact Guard   Supine to Sit Supervision   Sit to Supine Supervision   Walk 10 Feet   Type of Assistance Needed Physical assistance   Physical Assistance Level 25% or less   Walk 10 Feet CARE Score 3   Walk 50 Feet with Two Turns   Type of Assistance Needed Physical assistance   Physical Assistance Level 25% or less   Walk 50 Feet with Two Turns CARE Score 3   Walk 150 Feet   Type of Assistance Needed Physical assistance   Physical Assistance Level 25% or less   Walk 150 Feet CARE Score 3   Walking 10 Feet on Uneven Surfaces   Type of Assistance Needed Physical assistance   Physical Assistance Level 25% or less   Walking 10 Feet on Uneven Surfaces CARE Score 3   Ambulation   Primary Mode of Locomotion Prior to Admission Walk   Distance Walked (feet)   (150)   Assist Device Roller Walker   Gait Pattern Inconsistant Maria Victoria   Limitations Noted In Balance;Endurance;Safety;Posture;Speed;Strength;Swing   Provided Assistance with: Balance   Walk Assist Level Contact Guard    Does the patient walk? 2. Yes   4 Steps   Type of Assistance Needed Physical assistance   Physical Assistance Level 25% or less   4 Steps CARE Score 3   12 Steps   Type of Assistance Needed Physical assistance   Physical Assistance Level 25% or less   12 Steps CARE Score 3   Stairs   Type Stairs   # of Steps 14   Weight Bearing Precautions WBAT   Assist Devices Bilateral Rail   Therapeutic Interventions   Strengthening Seated LE TE   Balance gait, transfers   Equipment Use   NuStep L1, 10 mins   Assessment   Treatment Assessment Patient tolerated the treatment well today. Patient required min cues for proper hand placement and body mechanics with transfers to increase safety and decrease assistance level. He was able to ambulate today with cues for posture, direction and safety. Patient's mobility is limited due to decreased endurance, safety and balance. Patient will continue to benefit from skilled therapy services to maximize his LOF   Problem List Decreased strength;Decreased endurance;Impaired balance;Decreased cognition;Impaired judgement;Decreased safety awareness;Impaired hearing;Decreased range of motion;Decreased coordination   PT Barriers   Physical Impairment Decreased strength;Decreased endurance;Impaired balance;Decreased mobility;Decreased cognition;Impaired judgement;Decreased safety awareness;Impaired hearing   Functional Limitation Car transfers;Ramp negotiation;Stair negotiation;Standing;Transfers;Walking   Plan   Treatment/Interventions ADL retraining;Functional transfer training;LE strengthening/ROM;Elevations;Therapeutic exercise;Endurance training;Cognitive reorientation;Patient/family training;Equipment eval/education;Bed mobility;Gait training;Compensatory technique education   Progress Progressing toward goals   PT Therapy Minutes   PT Time In 0930   PT Time Out 1030   PT Total Time (minutes) 60   PT Mode of treatment - Individual (minutes) 60   Therapy Time missed   Time missed? No

## 2024-03-09 NOTE — NURSING NOTE
Pt voided small amt in bathroom 100 ml, PVR bladder scan 707 ml. Attempted to place brooks 1st time unsuccessful, difficult placement. Attempted 2nd time-successful. 300 ML yellow clear urine drained into standard drainage bag. Stat lock in place. Pt resting comfortable at this time, continuing to monitor and follow plan of care.

## 2024-03-09 NOTE — NURSING NOTE
Patient is awake and alert. Wife supportive and at bedside most of day today.  Continues with expressive aphasia and hearing loss with cochlear implant.  Finley catheter  intact and draining tiana urine.  Continues with thick coating of thrush on tongue. Nystatin administered per MD order.  No complaints of pain or discomfort appreciated.  Call bell in reach at bedside.

## 2024-03-10 PROCEDURE — 97535 SELF CARE MNGMENT TRAINING: CPT

## 2024-03-10 PROCEDURE — 97530 THERAPEUTIC ACTIVITIES: CPT

## 2024-03-10 RX ADMIN — ENOXAPARIN SODIUM 40 MG: 40 INJECTION SUBCUTANEOUS at 08:50

## 2024-03-10 RX ADMIN — LORATADINE 10 MG: 10 TABLET ORAL at 08:50

## 2024-03-10 RX ADMIN — ACETAMINOPHEN 650 MG: 325 TABLET ORAL at 20:49

## 2024-03-10 RX ADMIN — TAMSULOSIN HYDROCHLORIDE 0.8 MG: 0.4 CAPSULE ORAL at 16:33

## 2024-03-10 RX ADMIN — Medication 1 TABLET: at 08:50

## 2024-03-10 RX ADMIN — NYSTATIN 500000 UNITS: 100000 SUSPENSION ORAL at 21:01

## 2024-03-10 RX ADMIN — SENNOSIDES 8.6 MG: 8.6 TABLET, FILM COATED ORAL at 21:01

## 2024-03-10 RX ADMIN — NYSTATIN 500000 UNITS: 100000 SUSPENSION ORAL at 08:50

## 2024-03-10 RX ADMIN — SERTRALINE HYDROCHLORIDE 25 MG: 25 TABLET ORAL at 08:50

## 2024-03-10 RX ADMIN — NYSTATIN 500000 UNITS: 100000 SUSPENSION ORAL at 17:18

## 2024-03-10 RX ADMIN — Medication 6 MG: at 21:01

## 2024-03-10 RX ADMIN — NYSTATIN 500000 UNITS: 100000 SUSPENSION ORAL at 11:40

## 2024-03-10 RX ADMIN — PRAVASTATIN SODIUM 40 MG: 40 TABLET ORAL at 16:33

## 2024-03-10 NOTE — PLAN OF CARE
Problem: PAIN - ADULT  Goal: Verbalizes/displays adequate comfort level or baseline comfort level  Description: Interventions:  - Encourage patient to monitor pain and request assistance  - Assess pain using appropriate pain scale  - Administer analgesics based on type and severity of pain and evaluate response  - Implement non-pharmacological measures as appropriate and evaluate response  - Consider cultural and social influences on pain and pain management  - Notify physician/advanced practitioner if interventions unsuccessful or patient reports new pain  Outcome: Progressing     Problem: INFECTION - ADULT  Goal: Absence or prevention of progression during hospitalization  Description: INTERVENTIONS:  - Assess and monitor for signs and symptoms of infection  - Monitor lab/diagnostic results  - Monitor all insertion sites, i.e. indwelling lines, tubes, and drains  - Monitor endotracheal if appropriate and nasal secretions for changes in amount and color  - [place] appropriate cooling/warming therapies per order  - Administer medications as ordered  - Instruct and encourage patient and family to use good hand hygiene technique  - Identify and instruct in appropriate isolation precautions for identified infection/condition  Outcome: Progressing  Goal: Absence of fever/infection during neutropenic period  Description: INTERVENTIONS:  - Monitor WBC    Outcome: Progressing

## 2024-03-10 NOTE — PROGRESS NOTES
Progress Note - Thomas Chase 83 y.o. male MRN: [id number]    Unit/Bed#: [facility] 213-02 Encounter: [id number]        Subjective:   Patient seen and examined at bedside after reviewing the chart and discussing the case with the caring staff.      Patient examined at bedside.  Patient will be receiving video swallow study tomorrow for which he will be picked up at 9:20 AM.    Physical Exam   Vitals: Blood pressure 118/80, pulse 91, temperature (!) 97.3 °F (36.3 °C), temperature source Temporal, resp. rate 16, height 6' (1.829 m), weight 59.4 kg (130 lb 15.3 oz), SpO2 92%.,Body mass index is 17.76 kg/m².  Constitutional: Patient in no acute distress.  HEENT: PERR, EOMI, MMM.  Cardiovascular: Normal rate and regular rhythm.    Pulmonary/Chest: Effort normal and breath sounds normal.   Abdomen: Soft, + BS, NT.  Bilateral feet:  Skin appears pink, warm, DP pulses 2+, no swelling    Assessment/Plan:  Thomas Chase is a(n) 83 y.o. year old male with left SDH and right SAH.     1.  Cardiac with hx of HTN, HLD.  On pravastatin 40 mg daily (simvastatin nonformulary).  Continue to monitor BP.  2.  Allergic rhinitis.  Patient is on loratadine 10 mg daily.  3.  Depression/anxiety.  Patient is on Zoloft 25 mg daily.  4.  Insomnia.  Patient is on melatonin 6 mg at bedtime.  5.  Thrush.  Nystatin liquid swish and swallow 4 times daily.  6.  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  Patient is mainly nonverbal.  7.  Urinary retention.  Urinary retention protocol.  Started on Flomax 0.4 mg daily 2/21/24.  Urology consulted.  Finley catheter placed 2/22/24, removed on 2/28/24, placed again 3/1/24, removed again 3/7/24.    8.  Dysphagia.  Speech therapy following.  Dysphagia 1 puureed with nectar thick liquid.  9.  Pain and bowel management.  As per physiatrist.  10.  Intracranial hemorrhage.   Patient will be receiving intensive PT OT ST as per physiatrist.    Anticipated discharge date:  Thursday, 3/14/24  PCP:   RONY Diaz

## 2024-03-10 NOTE — PROGRESS NOTES
03/10/24 0700   Pain Assessment   Pain Assessment Tool 0-10   Pain Score No Pain   Restrictions/Precautions   Precautions Aspiration;Bed/chair alarms;Cognitive;Fall Risk;Finley;Hard of hearing   Weight Bearing Restrictions No   ROM Restrictions No   Eating   Type of Assistance Needed Supervision   Physical Assistance Level No physical assistance   Comment coughing after approx 25% of bites, less cueing required to attend to breakfast tray with high rate of intake   Eating CARE Score 4   Oral Hygiene   Type of Assistance Needed Supervision   Physical Assistance Level No physical assistance   Comment pt with poor thoroughness during initial oral care trial, completed second trial with visual cues for thoroughness   Oral Hygiene CARE Score 4   Grooming   Able To Comb/Brush Hair;Wash/Dry Face;Brush/Clean Teeth   Limitation Noted In Problem Solving;Safety;Sequencing   Shower/Bathe Self   Type of Assistance Needed Physical assistance   Physical Assistance Level 26%-50%   Comment assist for buttocks, bilat lower LE; Finley care completed by OT   Shower/Bathe Self CARE Score 3   Bathing   Assessed Bath Style Sponge Bath   Anticipated D/C Bath Style Shower;Sponge Bath   Able to Wash/Rinse/Dry (body part) Left Arm;Right Arm;L Upper Leg;R Upper Leg;Chest;Abdomen   Limitations Noted in Balance;Endurance;Problem Solving;ROM;Safety;Sequencing;Strength   Positioning Seated;Standing   Upper Body Dressing   Type of Assistance Needed Physical assistance   Physical Assistance Level 51%-75%   Comment assist to doff/don flannel shirt over bilat UE, initiate doffing t-shirt over head, don t-shirt over RUE and head   Upper Body Dressing CARE Score 2   Lower Body Dressing   Type of Assistance Needed Physical assistance   Physical Assistance Level 76% or more   Comment able to assist in pulling clothing up to knees when seated, pulled underwear over hips/buttocks when standing with significant visual cueing   Lower Body Dressing CARE Score  2   Putting On/Taking Off Footwear   Type of Assistance Needed Physical assistance   Physical Assistance Level 76% or more   Comment able to present bilat feet to OT to doff/don socks   Putting On/Taking Off Footwear CARE Score 2   Dressing/Undressing Clothing   Remove UB Clothes Pullover Shirt;Jacket   Don UB Clothes Pullover Shirt;Jacket   Remove LB Clothes Pants;Undergarment;Socks   Don LB Clothes Pants;Socks;Undergarment   Limitations Noted In Balance;Endurance;Problem Solving;Safety;Strength;Sequencing;ROM   Positioning Supported Sit   Sit to Lying   Type of Assistance Needed Supervision   Physical Assistance Level No physical assistance   Sit to Lying CARE Score 4   Lying to Sitting on Side of Bed   Type of Assistance Needed Physical assistance   Physical Assistance Level 26%-50%   Lying to Sitting on Side of Bed CARE Score 3   Sit to Stand   Type of Assistance Needed Physical assistance   Physical Assistance Level 25% or less   Sit to Stand CARE Score 3   Bed-Chair Transfer   Type of Assistance Needed Physical assistance   Physical Assistance Level 25% or less   Comment assist to maintain balance   Chair/Bed-to-Chair Transfer CARE Score 3   Transfer Bed/Chair/Wheelchair   Limitations Noted In Balance;Endurance;Problem Solving;Sequencing;LE Strength   Adaptive Equipment Roller Walker   Cognition   Overall Cognitive Status Impaired   Arousal/Participation Alert;Cooperative   Attention Attends with cues to redirect   Orientation Level Oriented to person;Oriented to place   Memory Decreased short term memory;Decreased recall of recent events;Decreased recall of precautions   Following Commands Follows one step commands with increased time or repetition   Assessment   Treatment Assessment Pt agreeable to OT session this AM. Received lying supine in bed. ADL session completed; current LOF and details listed in respective sections. Pt is overall maxA LB dressing, modA UB dressing, Brayan bathing, supervision oral care  and breakfast tray completion. Pt with difficulty initiating tasks this session as well as responding to visual cues from OT encouraging participation in ADL tasks. Pt's BP low following ADL tasks; pt returned to bed with feet elevated in attempt to raise BP, which was accomplished and pt returned to OOB for breakfast. Coughing noted after approx 25% of bites, however pt required less cueing to initiate taking bites this session. Pt overall did appear more fatigued this session with difficulty sequencing and attending to tasks. Pt's barriers to d/c include decreased strength throughout, decreased balance, impaired hearing, impaired cognition including safety awareness, problem solving, attention, comprehension, and expression, and decreased activity tolerance; all affect independence in self care and fxl transfers. Pt would benefit from continued skilled OT services in order to address listed barriers and prepare for safe d/c.   Prognosis Fair   Problem List Decreased strength;Decreased endurance;Impaired balance;Decreased cognition;Impaired judgement;Decreased safety awareness;Impaired hearing;Decreased range of motion;Decreased coordination   Plan   Treatment/Interventions ADL retraining;Functional transfer training;LE strengthening/ROM;Endurance training;Therapeutic exercise;Cognitive reorientation;Patient/family training;Equipment eval/education;Compensatory technique education;OT   Progress Progressing toward goals   OT Therapy Minutes   OT Time In 0700   OT Time Out 0830   OT Total Time (minutes) 90   OT Mode of treatment - Individual (minutes) 90

## 2024-03-10 NOTE — NURSING NOTE
Wife visiting this afternoon.  Supervision with transfers in and out of bed to w/c.  For video swallow tomorrow.  Expressive aphasia , pt will say a word or two at times.  Continue same plan of care.

## 2024-03-11 ENCOUNTER — APPOINTMENT (OUTPATIENT)
Dept: RADIOLOGY | Facility: HOSPITAL | Age: 84
End: 2024-03-11
Payer: MEDICARE

## 2024-03-11 LAB
25(OH)D3 SERPL-MCNC: 53.6 NG/ML (ref 30–100)
ALBUMIN SERPL BCP-MCNC: 3.1 G/DL (ref 3.5–5)
ALP SERPL-CCNC: 112 U/L (ref 34–104)
ALT SERPL W P-5'-P-CCNC: 26 U/L (ref 7–52)
ANION GAP SERPL CALCULATED.3IONS-SCNC: 7 MMOL/L
AST SERPL W P-5'-P-CCNC: 18 U/L (ref 13–39)
BASOPHILS # BLD AUTO: 0.02 THOUSANDS/ÂΜL (ref 0–0.1)
BASOPHILS NFR BLD AUTO: 0 % (ref 0–1)
BILIRUB SERPL-MCNC: 0.37 MG/DL (ref 0.2–1)
BUN SERPL-MCNC: 15 MG/DL (ref 5–25)
CALCIUM ALBUM COR SERPL-MCNC: 9.1 MG/DL (ref 8.3–10.1)
CALCIUM SERPL-MCNC: 8.4 MG/DL (ref 8.4–10.2)
CHLORIDE SERPL-SCNC: 103 MMOL/L (ref 96–108)
CO2 SERPL-SCNC: 31 MMOL/L (ref 21–32)
CREAT SERPL-MCNC: 0.63 MG/DL (ref 0.6–1.3)
CRP SERPL QL: 51.6 MG/L
EOSINOPHIL # BLD AUTO: 0.13 THOUSAND/ÂΜL (ref 0–0.61)
EOSINOPHIL NFR BLD AUTO: 2 % (ref 0–6)
ERYTHROCYTE [DISTWIDTH] IN BLOOD BY AUTOMATED COUNT: 14 % (ref 11.6–15.1)
ERYTHROCYTE [SEDIMENTATION RATE] IN BLOOD: 58 MM/HOUR (ref 0–19)
GFR SERPL CREATININE-BSD FRML MDRD: 90 ML/MIN/1.73SQ M
GLUCOSE SERPL-MCNC: 80 MG/DL (ref 65–140)
HCT VFR BLD AUTO: 39 % (ref 36.5–49.3)
HGB BLD-MCNC: 12.3 G/DL (ref 12–17)
IMM GRANULOCYTES # BLD AUTO: 0.01 THOUSAND/UL (ref 0–0.2)
IMM GRANULOCYTES NFR BLD AUTO: 0 % (ref 0–2)
LYMPHOCYTES # BLD AUTO: 0.85 THOUSANDS/ÂΜL (ref 0.6–4.47)
LYMPHOCYTES NFR BLD AUTO: 15 % (ref 14–44)
MCH RBC QN AUTO: 31.3 PG (ref 26.8–34.3)
MCHC RBC AUTO-ENTMCNC: 31.5 G/DL (ref 31.4–37.4)
MCV RBC AUTO: 99 FL (ref 82–98)
MONOCYTES # BLD AUTO: 0.52 THOUSAND/ÂΜL (ref 0.17–1.22)
MONOCYTES NFR BLD AUTO: 9 % (ref 4–12)
NEUTROPHILS # BLD AUTO: 4.11 THOUSANDS/ÂΜL (ref 1.85–7.62)
NEUTS SEG NFR BLD AUTO: 74 % (ref 43–75)
NRBC BLD AUTO-RTO: 0 /100 WBCS
PLATELET # BLD AUTO: 345 THOUSANDS/UL (ref 149–390)
PMV BLD AUTO: 9.9 FL (ref 8.9–12.7)
POTASSIUM SERPL-SCNC: 4.3 MMOL/L (ref 3.5–5.3)
PROT SERPL-MCNC: 6.2 G/DL (ref 6.4–8.4)
RBC # BLD AUTO: 3.93 MILLION/UL (ref 3.88–5.62)
SODIUM SERPL-SCNC: 141 MMOL/L (ref 135–147)
VIT B12 SERPL-MCNC: 355 PG/ML (ref 180–914)
WBC # BLD AUTO: 5.64 THOUSAND/UL (ref 4.31–10.16)

## 2024-03-11 PROCEDURE — 85652 RBC SED RATE AUTOMATED: CPT | Performed by: FAMILY MEDICINE

## 2024-03-11 PROCEDURE — 80053 COMPREHEN METABOLIC PANEL: CPT

## 2024-03-11 PROCEDURE — 74230 X-RAY XM SWLNG FUNCJ C+: CPT

## 2024-03-11 PROCEDURE — 82306 VITAMIN D 25 HYDROXY: CPT | Performed by: FAMILY MEDICINE

## 2024-03-11 PROCEDURE — 97110 THERAPEUTIC EXERCISES: CPT

## 2024-03-11 PROCEDURE — 86780 TREPONEMA PALLIDUM: CPT | Performed by: FAMILY MEDICINE

## 2024-03-11 PROCEDURE — 86140 C-REACTIVE PROTEIN: CPT | Performed by: FAMILY MEDICINE

## 2024-03-11 PROCEDURE — 92611 MOTION FLUOROSCOPY/SWALLOW: CPT

## 2024-03-11 PROCEDURE — 84165 PROTEIN E-PHORESIS SERUM: CPT | Performed by: FAMILY MEDICINE

## 2024-03-11 PROCEDURE — 97116 GAIT TRAINING THERAPY: CPT

## 2024-03-11 PROCEDURE — 97530 THERAPEUTIC ACTIVITIES: CPT

## 2024-03-11 PROCEDURE — 97130 THER IVNTJ EA ADDL 15 MIN: CPT

## 2024-03-11 PROCEDURE — 99232 SBSQ HOSP IP/OBS MODERATE 35: CPT | Performed by: PHYSICAL MEDICINE & REHABILITATION

## 2024-03-11 PROCEDURE — 85025 COMPLETE CBC W/AUTO DIFF WBC: CPT

## 2024-03-11 PROCEDURE — 97537 COMMUNITY/WORK REINTEGRATION: CPT

## 2024-03-11 PROCEDURE — 97129 THER IVNTJ 1ST 15 MIN: CPT

## 2024-03-11 PROCEDURE — 92526 ORAL FUNCTION THERAPY: CPT | Performed by: NURSE PRACTITIONER

## 2024-03-11 PROCEDURE — 82607 VITAMIN B-12: CPT | Performed by: FAMILY MEDICINE

## 2024-03-11 RX ORDER — SODIUM CHLORIDE 9 MG/ML
75 INJECTION, SOLUTION INTRAVENOUS CONTINUOUS
Status: DISCONTINUED | OUTPATIENT
Start: 2024-03-11 | End: 2024-03-12

## 2024-03-11 RX ORDER — CLOTRIMAZOLE 10 MG/1
10 LOZENGE ORAL; TOPICAL
Status: DISPENSED | OUTPATIENT
Start: 2024-03-11 | End: 2024-03-21

## 2024-03-11 RX ORDER — SODIUM CHLORIDE 9 MG/ML
150 INJECTION, SOLUTION INTRAVENOUS CONTINUOUS
Status: DISCONTINUED | OUTPATIENT
Start: 2024-03-11 | End: 2024-03-11

## 2024-03-11 RX ADMIN — NYSTATIN 500000 UNITS: 100000 SUSPENSION ORAL at 08:12

## 2024-03-11 RX ADMIN — CYANOCOBALAMIN TAB 500 MCG 500 MCG: 500 TAB at 11:52

## 2024-03-11 RX ADMIN — SERTRALINE HYDROCHLORIDE 25 MG: 25 TABLET ORAL at 08:12

## 2024-03-11 RX ADMIN — LORATADINE 10 MG: 10 TABLET ORAL at 08:12

## 2024-03-11 RX ADMIN — SODIUM CHLORIDE 75 ML/HR: 0.9 INJECTION, SOLUTION INTRAVENOUS at 23:17

## 2024-03-11 RX ADMIN — PRAVASTATIN SODIUM 40 MG: 40 TABLET ORAL at 16:09

## 2024-03-11 RX ADMIN — ACETAMINOPHEN 650 MG: 325 TABLET ORAL at 21:29

## 2024-03-11 RX ADMIN — SODIUM CHLORIDE 75 ML/HR: 0.9 INJECTION, SOLUTION INTRAVENOUS at 16:05

## 2024-03-11 RX ADMIN — TAMSULOSIN HYDROCHLORIDE 0.8 MG: 0.4 CAPSULE ORAL at 16:09

## 2024-03-11 RX ADMIN — SODIUM CHLORIDE 150 ML/HR: 0.9 INJECTION, SOLUTION INTRAVENOUS at 13:00

## 2024-03-11 RX ADMIN — Medication 1 TABLET: at 08:12

## 2024-03-11 RX ADMIN — Medication 6 MG: at 21:29

## 2024-03-11 RX ADMIN — ENOXAPARIN SODIUM 40 MG: 40 INJECTION SUBCUTANEOUS at 08:12

## 2024-03-11 RX ADMIN — SENNOSIDES 8.6 MG: 8.6 TABLET, FILM COATED ORAL at 21:29

## 2024-03-11 NOTE — NURSING NOTE
Back to [facility] in satisfactory condition  from [facility] after swallow study done.

## 2024-03-11 NOTE — PLAN OF CARE
Problem: PAIN - ADULT  Goal: Verbalizes/displays adequate comfort level or baseline comfort level  Description: Interventions:  - Encourage patient to monitor pain and request assistance  - Assess pain using appropriate pain scale  - Administer analgesics based on type and severity of pain and evaluate response  - Implement non-pharmacological measures as appropriate and evaluate response  - Consider cultural and social influences on pain and pain management  - Notify physician/advanced practitioner if interventions unsuccessful or patient reports new pain  Outcome: Progressing     Problem: INFECTION - ADULT  Goal: Absence or prevention of progression during hospitalization  Description: INTERVENTIONS:  - Assess and monitor for signs and symptoms of infection  - Monitor lab/diagnostic results  - Monitor all insertion sites, i.e. indwelling lines, tubes, and drains  - Monitor endotracheal if appropriate and nasal secretions for changes in amount and color  - [place] appropriate cooling/warming therapies per order  - Administer medications as ordered  - Instruct and encourage patient and family to use good hand hygiene technique  - Identify and instruct in appropriate isolation precautions for identified infection/condition  Outcome: Progressing  Goal: Absence of fever/infection during neutropenic period  Description: INTERVENTIONS:  - Monitor WBC    Outcome: Progressing

## 2024-03-11 NOTE — PROGRESS NOTES
03/11/24 1340   Pain Assessment   Pain Assessment Tool 0-10   Pain Score No Pain   Restrictions/Precautions   Precautions Aspiration;Bed/chair alarms;Cognitive;Fall Risk;Finley;Hard of hearing   Weight Bearing Restrictions No   ROM Restrictions No   Putting On/Taking Off Footwear   Type of Assistance Needed Physical assistance   Physical Assistance Level Total assistance   Comment OT changed socks and shoes into DWAYNE's and gripper socks while pt in recliner   Putting On/Taking Off Footwear CARE Score 1   Exercise Tools   Other Exercise Tool 1 3x5 pronation/supination and internal/external rotation using yellow flexbar in bilat UE, pt unable to complete wrist flexion/extension component of activity even after visual and physical cues from OT   Other Exercise Tool 2 pinched clothespins to hang and remove from rods using R hand, pt able to complete without cues after approx 25% of activity completed and only occasional validation from OT to keep working   Other Exercise Tool 3 card matching activity using R hand to place cards, pt verbalized each card and suit prior to putting in pocket, one error easily redirected by OT   Coordination   Fine Motor tied and untied knots in red tubing using bilat hands, increased time and significant physical and visual cueing required for pt to complete effectively   Cognition   Overall Cognitive Status Impaired   Arousal/Participation Alert;Cooperative   Attention Attends with cues to redirect   Orientation Level Oriented to person;Oriented to time  (required cues for day of week and month, oriented to year)   Memory Decreased short term memory;Decreased recall of recent events;Decreased recall of precautions   Following Commands Follows one step commands with increased time or repetition   Additional Activities   Additional Activities Comments cognitive activity completed via Parquetry board, pt with increased difficulty completing this activity today as compared to previous  sessions and required almost constant visual cues to use correct piece and align correctly in shape   Assessment   Treatment Assessment Pt agreeable to OT session this PM. Received in recliner chair with bilat LE elevated to encourage raise in blood pressure. Pt completed ROM/strength, fine motor, activity tolerance, and cognitive activities; details in respective sections. Pt continues with difficulty sequencing and problem solving during cognitive tasks as well as with visual-spatial perception as evident during Parquetry board. Pt's attention is improving during respective activities, however does not always respond to physical and visual cues that intend to correct completion of activities. Pt verbalized both spontaneously and in response to questions posed by OT effectively this session. DWAYNE's donned during session to raise BP. Pt's wife and friend present during session and provided good encouragement. Pt's barriers to d/c include decreased strength throughout, decreased balance, impaired hearing, impaired cognition including safety awareness, problem solving, attention, comprehension, and expression, and decreased activity tolerance; all affect independence in self care and fxl transfers. Pt would benefit from continued skilled OT services in order to address listed barriers and prepare for safe d/c.   Prognosis Fair   Problem List Decreased strength;Decreased endurance;Impaired balance;Decreased cognition;Impaired judgement;Decreased safety awareness;Impaired hearing;Decreased range of motion;Decreased coordination   Plan   Treatment/Interventions Functional transfer training;ADL retraining;LE strengthening/ROM;Therapeutic exercise;Endurance training;Cognitive reorientation;Equipment eval/education;Patient/family training;Compensatory technique education;OT   Progress Progressing toward goals   OT Therapy Minutes   OT Time In 1340   OT Time Out 1515   OT Total Time (minutes) 95   OT Mode of treatment -  Individual (minutes) 95

## 2024-03-11 NOTE — ASSESSMENT & PLAN NOTE
Still low at times, but asymptomatic.  His normal appears to be SBP   Continue Midodrine 5 mg TID (3/21/24) per primary service.  Continue compression stockings during day.  Adequate hydration    BP lower since 3/10/2024  Compression stockings and abdominal binder ordered  Internal medicine ordering IV fluid bolus.  I continued IVF x 1 L which was completed  BP continues to be very low especially in standing.   Labs reviewed - nothing to explain low BP

## 2024-03-11 NOTE — NURSING NOTE
Low BP this a.m., see documentation.  Dr. Paz. Dr Lugo and team made aware.  Pt asymptomatic.  Labs drawn as ordered.  IV # 22 inserted in left arm without difficulty.   ml bolus as ordered then rate decrease down to 75 ml/hr.  Family visiting.

## 2024-03-11 NOTE — PROGRESS NOTES
PM&R PROGRESS NOTE:  Thomas Chase 83 y.o. male MRN: [id number]  Unit/Bed#: -02 Encounter: [id number]        **CHANGE IN REHAB DIAGNOSIS FROM PRE-ADMISSION SCREEN  Rehab Diagnosis: Impairment of mobility, safety, Activities of Daily Living (ADLs), and cognitive/communication skills due to Brain Dysfunction:  02.22  Traumatic, Closed Injury  Etiologic Diagnosis: L SDH, R SAH, L anterior hypodensity lateral temporal lobe    HPI: Thomas Chase is a 83 y.o. male with past medical history significant for previous fall with resultant traumatic brain injury-subdural hematoma in 2022 status post left craniotomy, chronic aphasia, hard of hearing with cochlear implant in place, hyperlipidemia who presented to the [facility] on 2/13/2024 with increased weakness and mental status change for several days.  Unclear if patient had head trauma.  CT head showing a 3 mm left frontal subdural hemorrhage.  CT lumbar spine showing DJD.  Patient found to have dysphagia and was seen by speech therapy.  VFSS on 2/15/2024 cleared him for a puréed diet with thin liquids.  Patient was also started by psychiatry for depression and started on Zoloft 25 mg daily.  Patient sustained a rapid response and a unwitnessed fall with head trauma notable bump on head on 2/15/2024.  CT head showing a new small volume acute subarachnoid hemorrhage in the left frontal opercular region as well as new nondisplaced fracture of the zygomatic arch with soft tissue contusion, stable 3 mm subdural hemorrhage seen previously.  Patient transferred to [facility] for further evaluation.  Patient seen by neurology and neurosurgery.  CTA head and neck showing a left anterior hypodensity in the left anterior lateral temporal lobe with 2 punctate hyperdensities.  These represented possible nonhemorrhagic contusions or venous infarcts.  Less likely to represent ischemia or neoplasm.  Follow-up imaging recommended with CT head in 2-3 weeks.  CT  venogram showing patent dural venous sinuses.  Patient was cleared for DVT prophylaxis and started on Keppra for seizure prophylaxis.    Medical course complicated by suspected UTI based on UA and symptoms.  Patient was treated with antibiotics x 3 days.  After medical stabilization, patient found to have acute functional deficits from his baseline in mobility, self-care, speech swallow cognition therefore admitted to [facility] for acute inpatient rehabilitation.    SUBJECTIVE: Patient seen face-to-face post swallow study today.  Feeling tired today.  Per nursing BP continues to be low since yesterday.  Discussed with internal medicine regarding IV fluids as patient has poor p.o. liquid intake without his wife present.    Wife is interested in subacute rehabilitation option      ASSESSMENT: Stable, progressing      PLAN:    Rehabilitation  Functional deficits: Aphasia, dysphagia, impaired cognition, impaired balance, impulsivity, poor safety awareness impaired mobility, self care    Continue current rehabilitation plan of care to maximize function.    Expected Discharge: TBD -case management to discuss with his wife regarding subacute options      DVT prophylaxis  Continue Lovenox      * Intracranial hemorrhage (HCC)  Assessment & Plan  Initial presentation on 2/13/2024 with increased weakness in the legs, mental status changes for several days  CT head showing a 3 mm left frontal subdural hemorrhage  Rapid response 2/15/2024 with fall and head trauma  CT head showing a new small volume acute subarachnoid hemorrhage in the left frontal opercular region and new nondisplaced fracture of the zygomatic arch with soft tissue contusion.    Transferred to [facility]  CTA head and neck and and repeat CT head showing a left anterior hypodensity in the left anterior lateral temporal lobe with 2 punctate hyperdensities.  Differential diagnosis nonhemorrhagic contusions or venous infarcts.  Patient seen by neurology and  neurosurgery  Conservative management  Cleared for DVT prophylaxis  Started on Keppra for seizure prophylaxis  Repeat CT head scheduled on Monday, 3/4/2024 -personally reviewed  Radiology read as follows:  Improving left anterior temporal lobe contusions with resolving edema. Stable subacute subdural hemorrhage along the anterior left frontal convexity. Resolved subarachnoid and intraventricular hemorrhages. Unchanged minimal hyperdensity beneath the left craniotomy flap. Stable right parafalcine subdural hygroma. There is no new intra or extra-axial hemorrhage.  Follow-up with neurosurgery on 3/6/2024 virtually completed.  Recommendations as follows: Neurosurgery will sign off, patient will follow-up in 2 weeks with repeat CT head   Follow-up with neurosurgery and neurology in outpatient clinic.    Behavior safety risk  Assessment & Plan  Patient demonstrating mild impulsivity in acute care  Fall x 2 in acute care  Falls at home in the past    Safety behavior plan going well and ARC.  No further impulsivity  Nursing and staff to provide close supervision near nursing station  Patient placed on 4 side rails (not as a restraint, patient and wife preference)  Monitor sleep-wake cycle  Avoid overstimulation: 1 visitor at a time, low lights, low sounds      Hematuria  Assessment & Plan  Started 3/2 - 3/3/2024  Minor, and more likely related to irritation from Finley catheter  Irrigation as needed per urology  Asymptomatic  H&H stable    Headache  Assessment & Plan  Resolved  Continue Tylenol 650 mg PRN     Severe protein-calorie malnutrition (HCC)  Assessment & Plan  BMI 17  Nutrition following  Optimize oral intake  Supplements ordered    Dysphagia  Assessment & Plan  VFSS completed 2/15/2024  Cleared for a puréed diet with nectar thick liquids  SLP to follow while at [facility]  Repeat VFSS today.  Results pending    Low BP  Assessment & Plan  BP lower since 3/10/2024  Compression stockings and abdominal binder  ordered  Internal medicine ordering IV fluid bolus  Check orthostatics    Urinary retention  Assessment & Plan  Finley catheter removed on ARC  Trial of void underway  Mixed pattern  Unfortunately required multiple straight catheterizations  Finley catheter replaced 3/1/24 per urology  Flomax increased to 0.8 mg every afternoon per urology  Trial of void 3/7/2024 - failed this  Finley re-inserted 3/8/24  Patient likely to require longer time prior to removal of Finley catheter      Constipation due to neurogenic bowel  Assessment & Plan  Patient started on more aggressive bowel program yesterday  Good BM 3/7/2024 and 3/8/2024    Hearing loss  Assessment & Plan  Chronic hearing loss  Patient status post cochlear implant  MRI contraindicated    Hypercholesterolemia  Assessment & Plan  Continue simvastatin 20 mg daily (home dose)    UTI (urinary tract infection)-resolved as of 2/21/2024  Assessment & Plan  Suspected UTI based on UA and symptoms in acute care  Completed 3 days IV antibiotics        Appreciate IM consultants medical co-management.  Labs, medications, and imaging personally reviewed.      ROS:  A ten point review of systems was completed on 03/11/24 and pertinent positives are listed in subjective section. All other systems reviewed were negative.       OBJECTIVE:   BP (!) 84/60 (BP Location: Left arm)   Pulse 56   Temp 97.7 °F (36.5 °C) (Temporal)   Resp 19   Ht 6' (1.829 m)   Wt 59 kg (130 lb 1.1 oz)   SpO2 98%   BMI 17.64 kg/m²     Physical Exam  Vitals and nursing note reviewed.   Constitutional:       General: He is not in acute distress.  HENT:      Head: Normocephalic and atraumatic.      Ears:      Comments: Impaired hearing     Nose: Nose normal.      Mouth/Throat:      Mouth: Mucous membranes are moist.   Eyes:      Conjunctiva/sclera: Conjunctivae normal.   Cardiovascular:      Rate and Rhythm: Normal rate and regular rhythm.      Pulses: Normal pulses.   Pulmonary:      Effort: Pulmonary  effort is normal.      Breath sounds: Normal breath sounds. No wheezing or rales.   Abdominal:      General: Bowel sounds are normal. There is no distension.      Palpations: Abdomen is soft.      Tenderness: There is no abdominal tenderness.   Genitourinary:     Comments: + Finley catheter in place  Musculoskeletal:         General: No swelling.      Cervical back: Neck supple.   Skin:     General: Skin is warm.   Neurological:      Mental Status: He is alert.      Motor: Weakness present.      Comments: Aphasia   Psychiatric:         Mood and Affect: Mood normal.          Lab Results   Component Value Date    WBC 5.64 03/11/2024    HGB 12.3 03/11/2024    HCT 39.0 03/11/2024    MCV 99 (H) 03/11/2024     03/11/2024     Lab Results   Component Value Date    SODIUM 141 03/11/2024    K 4.3 03/11/2024     03/11/2024    CO2 31 03/11/2024    BUN 15 03/11/2024    CREATININE 0.63 03/11/2024    GLUC 80 03/11/2024    CALCIUM 8.4 03/11/2024     Lab Results   Component Value Date    INR 1.02 06/02/2022    INR 1.10 05/29/2022    INR 1.04 05/28/2022    PROTIME 13.0 06/02/2022    PROTIME 13.7 05/29/2022    PROTIME 13.2 05/28/2022           Current Facility-Administered Medications:     acetaminophen (TYLENOL) tablet 650 mg, 650 mg, Oral, Q6H PRN, Daniel Clayton MD, 650 mg at 03/10/24 2049    bisacodyl (DULCOLAX) rectal suppository 10 mg, 10 mg, Rectal, Daily PRN, Darwin Paz MD    clotrimazole (MYCELEX) davy 10 mg, 10 mg, Oral, 5x Daily, Darwin Paz MD    cyanocobalamin (VITAMIN B-12) tablet 500 mcg, 500 mcg, Oral, Daily, Darwin Paz MD, 500 mcg at 03/11/24 1152    enoxaparin (LOVENOX) subcutaneous injection 40 mg, 40 mg, Subcutaneous, Q24H JEANNIE, Alexandrea Lugo MD, 40 mg at 03/11/24 0812    lactulose (CHRONULAC) oral solution 20 g, 20 g, Oral, BID PRN, Darwin Paz MD, 20 g at 03/05/24 2107    loratadine (CLARITIN) tablet 10 mg, 10 mg, Oral, Daily, Amada Epperson PA-C, 10 mg at 03/11/24 0812     meclizine (ANTIVERT) tablet 25 mg, 25 mg, Oral, Q8H PRN, Amada L Dagnall, PA-C    melatonin tablet 6 mg, 6 mg, Oral, HS, Amada L Dagnall, PA-C, 6 mg at 03/10/24 2101    multivitamin-minerals (CENTRUM) tablet 1 tablet, 1 tablet, Oral, Daily, Amada L Dagnall, PA-C, 1 tablet at 03/11/24 0812    polyethylene glycol (MIRALAX) packet 17 g, 17 g, Oral, Daily PRN, Ashley Depadua, MD, 17 g at 03/05/24 1740    pravastatin (PRAVACHOL) tablet 40 mg, 40 mg, Oral, Daily With Dinner, Amada L Dagnall, PA-C, 40 mg at 03/10/24 1633    senna (SENOKOT) tablet 8.6 mg, 1 tablet, Oral, HS, Alexandrea Lugo MD, 8.6 mg at 03/10/24 2101    sertraline (ZOLOFT) tablet 25 mg, 25 mg, Oral, Daily, Amada L Dagnall, PA-C, 25 mg at 03/11/24 0812    sodium chloride 0.9 % infusion, 150 mL/hr, Intravenous, Continuous, Darwin Paz MD, Last Rate: 150 mL/hr at 03/11/24 1300, 150 mL/hr at 03/11/24 1300    tamsulosin (FLOMAX) capsule 0.8 mg, 0.8 mg, Oral, Daily With Dinner, Norberto Boyer MD, 0.8 mg at 03/10/24 1633    Past Medical History:   Diagnosis Date    Arthritis     Encounter for general adult medical examination without abnormal findings 03/20/2019    Fall 11/03/2022    Hyperlipidemia     Macular degeneration, wet (HCC)     Urinary retention 06/02/2022       Patient Active Problem List    Diagnosis Date Noted    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    Behavior safety risk 02/20/2024    Hematuria 03/04/2024    Headache 02/20/2024    Bilateral lower extremity weakness 02/17/2024    Abnormal CT scan, lumbar spine 02/15/2024    Severe protein-calorie malnutrition (HCC) 02/14/2024    Debility 02/13/2024    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    Dysphagia 11/21/2023    Macular degeneration, age related 02/27/2023    Traumatic subdural hemorrhage with loss of consciousness of unspecified duration, initial encounter ([facility]) 02/27/2023    Sensorineural hearing loss (SNHL), bilateral 08/18/2022    Balance disorder 06/28/2022    Abnormal  echocardiogram 06/27/2022    Right bundle-branch block 06/27/2022    Moderate protein-calorie malnutrition (HCC) 06/19/2022    Low BP 06/19/2022    Diastolic dysfunction 06/19/2022    EKG abnormalities 06/19/2022    Constipation due to neurogenic bowel 06/02/2022    Urinary retention 06/02/2022    SDH (subdural hematoma) ([facility]) 05/27/2022    Primary osteoarthritis of left knee 05/17/2022    Hygroma 04/26/2022    Right hand pain     Carpal tunnel syndrome on right     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    Overweight (BMI 25.0-29.9) 01/27/2021    Negative depression screening 09/26/2019    Hypercholesterolemia 07/12/2018    Borderline hypertension 07/12/2018    Hearing loss 04/08/2009          Alexandrea Lugo MD    I have spent a total time of 36 minutes on 03/11/24 in caring for this patient including Impressions, Documenting in the medical record, Reviewing / ordering tests, medicine, procedures  , and Communicating with other healthcare professionals .      ** Please Note:  voice to text software may have been used in the creation of this document. Although proof errors in transcription or interpretation are a potential of such software**

## 2024-03-11 NOTE — PROGRESS NOTES
Progress Note - Thomas Chase 83 y.o. male MRN: [id number]    Unit/Bed#: [facility] 213-02 Encounter: [id number]        Subjective:   Patient seen and examined at bedside after reviewing the chart and discussing the case with the caring staff.      Patient examined at bedside.  Patient's blood pressure was noticed to be low.  This has been is staying low without any symptoms.  Patient also noticed to have continuous thrush despite of getting nystatin liquid swish and swallow for the past 10 days.  Apparently patient is not following up the instructions.    Patient has received video swallow study today.  Official report is not available.    Sling labs have been ordered.  Vitamin D levels, vitamin B12 levels, SPEP, sed rate, CRP, FTA-ABS.    Physical Exam   Vitals: Blood pressure (!) 84/60, pulse 56, temperature 97.7 °F (36.5 °C), temperature source Temporal, resp. rate 19, height 6' (1.829 m), weight 59 kg (130 lb 1.1 oz), SpO2 98%.,Body mass index is 17.64 kg/m².  Constitutional: Patient in no acute distress.  HEENT: PERR, EOMI, MMM.  Cardiovascular: Normal rate and regular rhythm.    Pulmonary/Chest: Effort normal and breath sounds normal.   Abdomen: Soft, + BS, NT.  Bilateral feet:  Skin appears pink, warm, DP pulses 2+, no swelling    Assessment/Plan:  Thomas Chase is a(n) 83 y.o. year old male with left SDH and right SAH.     1.  Cardiac with hx of HTN, HLD/hypotension.  On pravastatin 40 mg daily (simvastatin nonformulary).  Continue to monitor BP.  I will put the patient on normal saline bolus 250 cc at a time to bring systolic blood pressure greater than 110 3/11/2024.  2.  Allergic rhinitis.  Patient is on loratadine 10 mg daily.  3.  Depression/anxiety.  Patient is on Zoloft 25 mg daily.  4.  Insomnia.  Patient is on melatonin 6 mg at bedtime.  5.  Thrush.  I will discontinue nystatin liquid swish and swallow 3/11/2024.  I will put the patient on clotrimazole lozenges 3/11/2024.  If patient is unable to do  that may be I will restart nystatin liquid swish and swallow.  6.  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  Patient is mainly nonverbal.  7.  Urinary retention.  Urinary retention protocol.  Started on Flomax 0.4 mg daily 2/21/24.  Urology consulted.  Finley catheter placed 2/22/24, removed on 2/28/24, placed again 3/1/24, removed again 3/7/24.    8.  Dysphagia.  Speech therapy following.  Dysphagia 1 puureed with nectar thick liquid.  Video swallow study done on 3/11/2024.  9.  Vitamin B12 deficiency.  Patient has been put on vitamin B12 500 mcg daily.  I am getting vitamin B12 levels for the patient.  10.  Pain and bowel management.  As per physiatrist.  11.  Intracranial hemorrhage.   Patient will be receiving intensive PT OT ST as per physiatrist.    Anticipated discharge date:  Thursday, 3/14/24  PCP:  RONY Diaz

## 2024-03-11 NOTE — PROGRESS NOTES
03/11/24 0642   Pain Assessment   Pain Assessment Tool 0-10   Pain Score No Pain   Restrictions/Precautions   Precautions Aspiration;Bed/chair alarms;Cognitive;Fall Risk;Finley;Hard of hearing;Impulsive;Supervision on toilet/commode   Cognition   Overall Cognitive Status Impaired   Arousal/Participation Alert;Responsive   Attention Attends with cues to redirect   Orientation Level Oriented to person;Oriented to place   Memory Decreased short term memory;Decreased recall of recent events;Decreased recall of precautions   Following Commands Follows one step commands with increased time or repetition   Roll Left and Right   Type of Assistance Needed Supervision   Comment using side rails   Roll Left and Right CARE Score 4   Lying to Sitting on Side of Bed   Type of Assistance Needed Supervision   Comment bedrails; increased visual/tactile cues   Lying to Sitting on Side of Bed CARE Score 4   Sit to Stand   Type of Assistance Needed Supervision   Comment close S STS using RW with increased visual/tactile cues for hand placement to push to stand vs pull to stand   Sit to Stand CARE Score 4   Bed-Chair Transfer   Type of Assistance Needed Incidental touching   Comment cg SPT with RW; increased visual/tactile cues for sequence/technique.   Chair/Bed-to-Chair Transfer CARE Score 4   Walk 10 Feet   Type of Assistance Needed Incidental touching   Comment cg level surfaces with RW; increased visual/tactile cues for sequence and direction   Walk 10 Feet CARE Score 4   Walk 50 Feet with Two Turns   Type of Assistance Needed Incidental touching   Comment cg level surfaces with RW; increased visual/tactile cues for sequence and direction   Walk 50 Feet with Two Turns CARE Score 4   Walk 150 Feet   Type of Assistance Needed Incidental touching   Comment cg level surfaces with RW; increased visual/tactile cues for sequence and direction   Walk 150 Feet CARE Score 4   Ambulation   Primary Mode of Locomotion Prior to Admission Walk    Distance Walked (feet) 190 ft  (190' 137')   Assist Device Roller Walker   Gait Pattern Inconsistant Maria Victoria;Slow Maria Victoria;Decreased foot clearance;Narrow ILIANA;Improper weight shift;Forward Flexion   Limitations Noted In Balance;Device Management;Endurance;Posture;Safety;Speed;Strength   Provided Assistance with: Balance;Direction   Walk Assist Level Contact Guard   Findings cg level surfaces with RW; increased visual/tactile cues for sequence and direction   Does the patient walk? 2. Yes   Therapeutic Interventions   Strengthening supine and seated ther ex   Balance gait and transfer training   Assessment   Treatment Assessment Patient agreeable to therapy session.    No pain reported.   Continues to require increased visual/tactile cues for general safety as well as sequence/direction for all tasks.  BP low this AM:  74/51 R arm 80/55 L arm (both with BP machine) 82/52 L arm (manual cuff), however patient asymptomatic.    Completed ther ex for general LE strengthening; gait and transfer training focusing on sequence and technique for improved balance and safety with functional mobility using walker.  Patient returned to room to recliner with feet elevated.   PT Barriers   Physical Impairment Decreased strength;Decreased range of motion;Decreased endurance;Impaired balance;Decreased mobility;Decreased coordination;Decreased cognition;Impaired judgement;Decreased safety awareness;Impaired hearing   Functional Limitation Wheelchair management;Walking;Transfers;Standing;Stair negotiation;Ramp negotiation;Car transfers   Plan   Treatment/Interventions Functional transfer training;LE strengthening/ROM;Therapeutic exercise;Bed mobility;Gait training   Progress Progressing toward goals   PT Therapy Minutes   PT Time In 0642   PT Time Out 0810   PT Total Time (minutes) 88   PT Mode of treatment - Individual (minutes) 88   PT Mode of treatment - Concurrent (minutes) 0   PT Mode of treatment - Group (minutes) 0   PT Mode of  treatment - Co-treat (minutes) 0   PT Mode of Treatment - Total time(minutes) 88 minutes   PT Cumulative Minutes 1369   Therapy Time missed   Time missed? No

## 2024-03-11 NOTE — PROCEDURES
Video Swallow Study      Patient Name: Thomas Chase  Today's Date: 3/11/2024        Past Medical History  Past Medical History:   Diagnosis Date    Arthritis     Encounter for general adult medical examination without abnormal findings 03/20/2019    Fall 11/03/2022    Hyperlipidemia     Macular degeneration, wet (HCC)     Urinary retention 06/02/2022        Past Surgical History  Past Surgical History:   Procedure Laterality Date    BRAIN HEMATOMA EVACUATION Left 05/28/2022    Procedure: left CRANIOTOMY FOR SUBDURAL HEMATOMA;  Surgeon: Chris Watts MD;  Location:  MAIN OR;  Service: Neurosurgery    CARPAL TUNNEL RELEASE      HERNIA REPAIR Right     inguinal    IR CEREBRAL ANGIOGRAPHY / INTERVENTION  06/02/2022    MO COCHLEAR DEVICE IMPLANTATION W/WO MASTOIDECTOMY Left 1/17/2023    Procedure: LEFT COCHLEAR IMPLANTATION WITH MASTOIDECTOMY AND FACIAL NERVE MONITORING WITH AUDIOMETRIC TESTING OF THE IMPLANT;  Surgeon: Susie Tuttle MD;  Location:  MAIN OR;  Service: ENT    MO NEUROPLASTY &/TRANSPOS MEDIAN NRV CARPAL TUNNE Right 09/20/2021    Procedure: RELEASE CARPAL TUNNEL;  Surgeon: Bruno Segovia MD;  Location: [facility];  Service: Orthopedics       Summary:  Images are on PACS for review.     Oral Phase : Pt presented with mild oral dysphagia. Bolus control, formation, and transfer were mildly reduced. Mild premature spill occurred. Trace posterior base of tongue residue.     Pharyngeal Phase : Pt presented with mod/severe pharyngeal dysphagia. Swallow initiation was delayed. Premature spillage occurred to the valleculae with all trials. With trials of thin liquids premature spill occurred to the pyriforms. Base of tongue retraction was reduced. Hyoid laryngeal elevation was variable WFL vs mild/mod reduced. Pharyngeal constriction and air way closure were reduced. Epiglottic inversion was incomplete. Mod to Max vallecular retention occurred with all  consistencies. Pyriform retention occurred with all consistencies.   Phillip aspiration occurred during and after the swallow with thin liquids via cup/straw. Pt had no sensation of aspiration events. Increased difficulty following verbal and written commands due to cognitive status and [place].  Cued cough appeared weak and ineffective in clearing aspirated residue. Pt will independently initiate multiple swallows (3-4) to clear residue however min successful in clearing residue.    Per Esophageal screen   All material cleared adequately     Observations: Pt [place] and hx of cognitive deficits. ST provided verbal and written directions however increased difficulty following. ST did administer solids or 13mm pill as safety concern.     Recommendations:  Diet: Dysphagia 1 pureed   Liquids: Nectar   Strategies: slow rate, small sips, alternate liquids with solids, double swallow and effortful swallow.   Upright position  F/u ST tx: yes  Full/Supervision  Aspiration Precautions  Reflux Precautions  Consider consult with: Nutrition   Results reviewed with: pt, OP SLP  Aspiration precautions posted.  If a dedicated assessment of the esophagus is desired, consider esophagram/barium swallow or EGD.    Pt is a 82 yearold male who was referred by Darwin Paz MD. He is currently working with ST OP. Spoke with OP ST who reported variable cough throughout all meal consistencies.       H&P/Admit info, pertinent provider notes/procedures/studies/PMH:(copied and placed in this report)  Thomas Chase is a(n) 83 y.o. year old male who was admitted to acute rehabilitation following left frontal SAH after an unwitnessed fall at the hospital.  Patient was initially admitted to the hospital on 2/13/2024 with generalized weakness and debility.  While in the hospital patient experienced an unwitnessed fall in the bathroom on 2/16/2024.  Patient CT scan of the head showed left-sided acute SAH on 2/16/2024. \  Patient is now admitted to acute  rehabilitation to receive extensive PT OT and SLP as per physiatrist.  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  Patient is also experiencing urinary obstruction requiring straight catheterization.  Patient examined at bedside.  Patient denied any active suicidal homicidal ideations.    Special Studies   CT head wo contrast (03/04/2024) Impression   Improving left anterior temporal lobe contusions with resolving edema.  Stable subacute subdural hemorrhage along the anterior left frontal convexity. Resolved subarachnoid and intraventricular hemorrhages. Unchanged minimal hyperdensity beneath the left craniotomy flap. Stable right parafalcine subdural hygroma. There is no   new intra or extra-axial hemorrhage.  CT venogram head &neck (02/19/2024) impression   1. 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 No mass effect.  2. Stable area of hypoattenuation in the left temporal lobe, partially obscured by artifact.  3. Patent dural venous sinuses.  XR chest portable (02/13/2024) Impression   No acute cardiopulmonary disease   CT head without contrast (02/13/2024) Impression  Thin subdural density measuring 3 mm in the left frontal region unchanged, previously attributed to subdural hematoma but could represent simple dural thickening given the chronicity     Code Status: Level 3 DN    Previous VBS:   Video Swallow Study: 2/14/2024  Summary:  Images are on PACS for review.   Oral Phase : Pt presented with mild oral dysphagia. Mastication was mildly prolonged however functional. Bolus control, formation, and transfer were WFL/WNL.   Pharyngeal Phase: Pt presented with mod/severe pharyngeal dysphagia. Swallow initiation was delayed resulting in spill to the valleculae with all  trials and min spill to the pyriforms with thin liquids. Hyoid laryngeal elevation and excursion was WFL/WNL. Epiglottic inversion was incomplete.  Tip of  epiglottis did not fully invert. Pharyngeal constriction was reduced. Pt had mild to mod vallecular retention with thin liquid trials. Provided with ntl/htl and all solid trials pt had mod to MAX vallecular rentention. Residue from vallecular rentention resulted in some spilling over epiglottis to trace anterior commissure. Trace to mild pyriform retention was present with all consistencies. Trace aspiration occurred with straw sips and Phillip with cup sips of thin intermittently. With trials of Nectar/honey thick liquids trace/mild aspiration occurred and increased epiglottic undercoat. Pt had no sensation of aspiration events. Increased difficulty following commands to utilize safe swallow strategies due to cognitive status and [place].  Cued cough however ineffective in reducing aspiration. As study progressed pt presented with increased wet vocal quality. Pt independently will initiates multiple swallows (3-4) to clear vallecular residue however are min successful   Per Esophageal screen   Limited screen as pt oropharyngeal dysphagia. Appeared to clear liquids/solids adequately. No residue at completion of study.    Observations:  Pt does have cochlear implant however continues with [place]. Benefits from simple written directions, however will intermittently follow due to cognitive status and [place].     Recommendations:  Diet: Dysphagia 1 Pureed   Liquids: Thin   Meds: Crushed w/ puree  Strategies: SLOW rate, SMALL SIPS, alternate liquids with solids, swallow prior to additonal po, double swallow, effortful swallow. Frequent oral care  Upright position  F/u ST tx: yes  Therapy Prognosis: guarded  Prognosis considerations:age, medical status, cognitive status  Full Supervision  Aspiration Precautions  Reflux Precautions  Consider consult with: Dietary  Results reviewed with: pt, nursing, family, physician, dietician  Aspiration precautions posted.     Precautions  Fall       Food allergies:   No known    Current diet:   Dysphagia 1 pureed and ntl liquids    Premorbid diet:  Dysphagia 1 pureed ant thin liquids    Dentition  Adequate    Oral Mech  WNL   O2 requirements:  Room air    Vocal Quality/Speech Hoarse    Cognitive status:  Hx of dementia      Consistencies administered: Barium laden applesauce,, nectar thick, thin liquids,  Liquids were administered by cup and straw.     Pt was seated laterally at 90 degrees.   Pt  unable to be viewed in AP position  due to transfer status.

## 2024-03-11 NOTE — CASE MANAGEMENT
[facility] accepting patient for home nursing/PT/OT/ST, information placed on discharge instructions. CM met with wife and patient, wife asking for additional days, as she has seen a n improvement since Friday, and would like him to have as much time in [facility] as possible. CM relayed this information to Neva Hancock of [facility].

## 2024-03-11 NOTE — PROGRESS NOTES
03/11/24 0642   Pain Assessment   Pain Assessment Tool 0-10   Pain Score No Pain   Restrictions/Precautions   Precautions Aspiration;Bed/chair alarms;Cognitive;Fall Risk;[place];Hard of hearing;Impulsive;Supervision on toilet/commode   Cognition   Overall Cognitive Status Impaired   Arousal/Participation Alert;Responsive   Attention Attends with cues to redirect   Orientation Level Oriented to person;Oriented to place   Memory Decreased short term memory;Decreased recall of recent events;Decreased recall of precautions   Following Commands Follows one step commands with increased time or repetition   Roll Left and Right   Type of Assistance Needed Supervision   Comment using side rails   Roll Left and Right CARE Score 4   Lying to Sitting on Side of Bed   Type of Assistance Needed Supervision   Comment bedrails; increased visual/tactile cues   Lying to Sitting on Side of Bed CARE Score 4   Sit to Stand   Type of Assistance Needed Supervision   Comment close S STS using RW with increased visual/tactile cues for hand placement to push to stand vs pull to stand   Sit to Stand CARE Score 4   Bed-Chair Transfer   Type of Assistance Needed Incidental touching   Comment cg SPT with RW; increased visual/tactile cues for sequence/technique.   Chair/Bed-to-Chair Transfer CARE Score 4   Walk 10 Feet   Type of Assistance Needed Incidental touching   Comment cg level surfaces with RW; increased visual/tactile cues for sequence and direction   Walk 10 Feet CARE Score 4   Walk 50 Feet with Two Turns   Type of Assistance Needed Incidental touching   Comment cg level surfaces with RW; increased visual/tactile cues for sequence and direction   Walk 50 Feet with Two Turns CARE Score 4   Walk 150 Feet   Type of Assistance Needed Incidental touching   Comment cg level surfaces with RW; increased visual/tactile cues for sequence and direction   Walk 150 Feet CARE Score 4   Ambulation   Primary Mode of Locomotion Prior to Admission Walk    Distance Walked (feet)   (190')   Assist Device Roller Walker   Gait Pattern Inconsistant [place];Slow [place];Decreased foot clearance;Narrow ILIANA;Improper weight shift;Forward Flexion   Limitations Noted In Balance;Device Management;Endurance;Posture;Safety;Speed;Strength   Provided Assistance with: Balance;Direction   Walk Assist Level Contact Guard   Findings cg level surfaces with RW; increased visual/tactile cues for sequence and direction   Does the patient walk? 2. Yes   Therapeutic Interventions   Strengthening supine and seated ther ex   Balance gait and transfer training   Assessment   Treatment Assessment Patient agreeable to therapy session.    No pain reported.   Continues to require increased visual/tactile cues for general safety as well as sequence/direction for all tasks.  BP low this AM:  74/51 R arm 80/55 L arm (both with BP machine) 82/52 L arm (manual cuff), however patient asymptomatic.    Completed ther ex for general LE strengthening; gait and transfer training focusing on sequence and technique for improved balance and safety with functional mobility using walker.  Patient returned to room to recliner with feet elevated.   PT Barriers   Physical Impairment Decreased strength;Decreased range of motion;Decreased endurance;Impaired balance;Decreased mobility;Decreased coordination;Decreased cognition;Impaired judgement;Decreased safety awareness;Impaired hearing   Functional Limitation Wheelchair management;Walking;Transfers;Standing;Stair negotiation;Ramp negotiation;Car transfers   Plan   Treatment/Interventions Functional transfer training;LE strengthening/ROM;Therapeutic exercise;Bed mobility;Gait training   Progress Progressing toward goals   PT Therapy Minutes   PT Time In 0642   PT Time Out 0810   PT Total Time (minutes) 88   PT Mode of treatment - Individual (minutes) 88   PT Mode of treatment - Concurrent (minutes) 0   PT Mode of treatment - Group (minutes) 0   PT Mode of treatment -  Co-treat (minutes) 0   PT Mode of Treatment - Total time(minutes) 88 minutes   PT Cumulative Minutes 1369   Therapy Time missed   Time missed? No

## 2024-03-11 NOTE — PROGRESS NOTES
03/11/24 1313   Pain Assessment   Pain Assessment Tool 0-10   Pain Score No Pain   Restrictions/Precautions   Precautions Aspiration;Bed/chair alarms;Cognitive;Fall Risk;[place];Hard of hearing;Impulsive;Supervision on toilet/commode   Cognition   Overall Cognitive Status Impaired   Arousal/Participation Alert;Responsive   Attention Attends with cues to redirect   Orientation Level Oriented to person;Oriented to place   Memory Decreased short term memory;Decreased recall of recent events;Decreased recall of precautions   Following Commands Follows one step commands with increased time or repetition   Sit to Stand   Type of Assistance Needed Supervision   Comment close S STS using RW with increased visual/tactile cues for hand placement to push to stand vs pull to stand   Sit to Stand CARE Score 4   Bed-Chair Transfer   Type of Assistance Needed Incidental touching   Comment cg SPT with RW; increased visual/tactile cues for sequence/technique.   Chair/Bed-to-Chair Transfer CARE Score 4   Walk 10 Feet   Type of Assistance Needed Incidental touching   Comment cg level and unlevel surfaces with RW; increased visual/tactile cues for sequence, direction, and management of [place] and IV tubing   Walk 10 Feet CARE Score 4   Walk 50 Feet with Two Turns   Type of Assistance Needed Incidental touching   Comment cg level and unlevel surfaces with RW; increased visual/tactile cues for sequence, direction, and management of [place] and IV tubing   Walk 50 Feet with Two Turns CARE Score 4   Walk 150 Feet   Type of Assistance Needed Incidental touching   Comment cg level and unlevel surfaces with RW; increased visual/tactile cues for sequence, direction, and management of [place] and IV tubing   Walk 150 Feet CARE Score 4   Walking 10 Feet on Uneven Surfaces   Type of Assistance Needed Incidental touching   Comment cg level and unlevel surfaces with RW; increased visual/tactile cues for sequence, direction, and management of [place] and  IV tubing   Walking 10 Feet on Uneven Surfaces CARE Score 4   Ambulation   Primary Mode of Locomotion Prior to Admission Walk   Distance Walked (feet) 152 ft  (137' 152')   Assist Device Roller Walker   Gait Pattern Inconsistant [place];Slow [place];Decreased foot clearance;Narrow ILIANA;Improper weight shift;Forward Flexion   Limitations Noted In Balance;Device Management;Endurance;Posture;Safety;Speed;Strength   Provided Assistance with: Balance;Direction   Walk Assist Level Contact Guard   Findings cg level and unlevel surfaces with RW; increased visual/tactile cues for sequence, direction, and management of [place] and IV tubing   Does the patient walk? 2. Yes   Curb or Single Stair   Style negotiated Single stair   Type of Assistance Needed Incidental touching   Comment cg; increased visual/tactile cues for sequence and direction   1 Step (Curb) CARE Score 4   4 Steps   Type of Assistance Needed Incidental touching   Comment cg; increased visual/tactile cues for sequence and direction   4 Steps CARE Score 4   12 Steps   Type of Assistance Needed Incidental touching   Comment cg; increased visual/tactile cues for sequence and direction   12 Steps CARE Score 4   Stairs   Type Stairs   # of Steps 14   Weight Bearing Precautions WBAT;Fall Risk   Assist Devices Bilateral Rail   Findings cg; increased visual/tactile cues for sequence and direction   Therapeutic Interventions   Balance gait and transfer training   Other stair negotiation   Assessment   Treatment Assessment Patient agreeable to therapy session.    Remains pain free.  Patient now with IV.  Increased visual/tactile cues for sequence and direction for all tasks as well as for [place] and IV tubing management.  Completed gait and transfer training focusing on sequence and technique for improved balance and safety with functional mobility using walker.   Negotiated steps with 2 handrails and increased cues for direction/sequence.   PT Barriers   Physical  Impairment Decreased strength;Decreased range of motion;Decreased endurance;Impaired balance;Decreased mobility;Decreased coordination;Decreased cognition;Impaired judgement;Decreased safety awareness;Impaired hearing   Functional Limitation Wheelchair management;Walking;Transfers;Standing;Stair negotiation;Ramp negotiation;Car transfers   Plan   Treatment/Interventions Functional transfer training;Elevations;Gait training   Progress Progressing toward goals   PT Therapy Minutes   PT Time In 1313   PT Time Out 1340   PT Total Time (minutes) 27   PT Mode of treatment - Individual (minutes) 27   PT Mode of treatment - Concurrent (minutes) 0   PT Mode of treatment - Group (minutes) 0   PT Mode of treatment - Co-treat (minutes) 0   PT Mode of Treatment - Total time(minutes) 27 minutes   PT Cumulative Minutes 1396   Therapy Time missed   Time missed? No

## 2024-03-11 NOTE — TELEPHONE ENCOUNTER
Scheduled patient to be seen by Dr. May on 3/21/24 at 1 pm at the [facility]. Patient is currently in [facility]. Will continue to follow.

## 2024-03-12 LAB
ALBUMIN SERPL ELPH-MCNC: 2.97 G/DL (ref 3.2–5.1)
ALBUMIN SERPL ELPH-MCNC: 48.7 % (ref 48–70)
ALPHA1 GLOB SERPL ELPH-MCNC: 0.43 G/DL (ref 0.15–0.47)
ALPHA1 GLOB SERPL ELPH-MCNC: 7 % (ref 1.8–7)
ALPHA2 GLOB SERPL ELPH-MCNC: 0.92 G/DL (ref 0.42–1.04)
ALPHA2 GLOB SERPL ELPH-MCNC: 15 % (ref 5.9–14.9)
BACTERIA UR QL AUTO: ABNORMAL /HPF
BETA GLOB ABNORMAL SERPL ELPH-MCNC: 0.4 G/DL (ref 0.31–0.57)
BETA1 GLOB SERPL ELPH-MCNC: 6.5 % (ref 4.7–7.7)
BETA2 GLOB SERPL ELPH-MCNC: 7.8 % (ref 3.1–7.9)
BETA2+GAMMA GLOB SERPL ELPH-MCNC: 0.48 G/DL (ref 0.2–0.58)
BILIRUB UR QL STRIP: NEGATIVE
CAOX CRY URNS QL MICRO: ABNORMAL /HPF
CLARITY UR: ABNORMAL
COLOR UR: YELLOW
GAMMA GLOB ABNORMAL SERPL ELPH-MCNC: 0.92 G/DL (ref 0.4–1.66)
GAMMA GLOB SERPL ELPH-MCNC: 15 % (ref 6.9–22.3)
GLUCOSE UR STRIP-MCNC: NEGATIVE MG/DL
HGB UR QL STRIP.AUTO: ABNORMAL
HYALINE CASTS #/AREA URNS LPF: ABNORMAL /LPF
IGG/ALB SER: 0.95 {RATIO} (ref 1.1–1.8)
KETONES UR STRIP-MCNC: NEGATIVE MG/DL
LEUKOCYTE ESTERASE UR QL STRIP: ABNORMAL
MUCOUS THREADS UR QL AUTO: ABNORMAL
NITRITE UR QL STRIP: NEGATIVE
NON-SQ EPI CELLS URNS QL MICRO: ABNORMAL /HPF
PH UR STRIP.AUTO: 6 [PH]
PROT PATTERN SERPL ELPH-IMP: ABNORMAL
PROT SERPL-MCNC: 6.1 G/DL (ref 6.4–8.2)
PROT UR STRIP-MCNC: ABNORMAL MG/DL
RBC #/AREA URNS AUTO: ABNORMAL /HPF
SP GR UR STRIP.AUTO: 1.02
T PALLIDUM AB SER QL IF: NON REACTIVE
UROBILINOGEN UR QL STRIP.AUTO: 1 E.U./DL
WBC #/AREA URNS AUTO: ABNORMAL /HPF

## 2024-03-12 PROCEDURE — 81001 URINALYSIS AUTO W/SCOPE: CPT | Performed by: FAMILY MEDICINE

## 2024-03-12 PROCEDURE — 97537 COMMUNITY/WORK REINTEGRATION: CPT

## 2024-03-12 PROCEDURE — 97535 SELF CARE MNGMENT TRAINING: CPT

## 2024-03-12 PROCEDURE — 97530 THERAPEUTIC ACTIVITIES: CPT

## 2024-03-12 PROCEDURE — 97110 THERAPEUTIC EXERCISES: CPT

## 2024-03-12 PROCEDURE — 92526 ORAL FUNCTION THERAPY: CPT | Performed by: NURSE PRACTITIONER

## 2024-03-12 PROCEDURE — 84165 PROTEIN E-PHORESIS SERUM: CPT | Performed by: PATHOLOGY

## 2024-03-12 PROCEDURE — 97116 GAIT TRAINING THERAPY: CPT

## 2024-03-12 PROCEDURE — 99233 SBSQ HOSP IP/OBS HIGH 50: CPT | Performed by: PHYSICAL MEDICINE & REHABILITATION

## 2024-03-12 RX ORDER — TAMSULOSIN HYDROCHLORIDE 0.4 MG/1
0.4 CAPSULE ORAL
Status: DISCONTINUED | OUTPATIENT
Start: 2024-03-12 | End: 2024-04-05 | Stop reason: HOSPADM

## 2024-03-12 RX ORDER — MIDODRINE HYDROCHLORIDE 2.5 MG/1
2.5 TABLET ORAL
Status: DISCONTINUED | OUTPATIENT
Start: 2024-03-12 | End: 2024-03-18

## 2024-03-12 RX ADMIN — ENOXAPARIN SODIUM 40 MG: 40 INJECTION SUBCUTANEOUS at 08:09

## 2024-03-12 RX ADMIN — CLOTRIMAZOLE 10 MG: 10 LOZENGE ORAL; TOPICAL at 11:50

## 2024-03-12 RX ADMIN — MIDODRINE HYDROCHLORIDE 2.5 MG: 2.5 TABLET ORAL at 16:51

## 2024-03-12 RX ADMIN — CLOTRIMAZOLE 10 MG: 10 LOZENGE ORAL; TOPICAL at 13:28

## 2024-03-12 RX ADMIN — SERTRALINE HYDROCHLORIDE 25 MG: 25 TABLET ORAL at 08:10

## 2024-03-12 RX ADMIN — Medication 6 MG: at 21:08

## 2024-03-12 RX ADMIN — SENNOSIDES 8.6 MG: 8.6 TABLET, FILM COATED ORAL at 21:08

## 2024-03-12 RX ADMIN — CLOTRIMAZOLE 10 MG: 10 LOZENGE ORAL; TOPICAL at 17:54

## 2024-03-12 RX ADMIN — PRAVASTATIN SODIUM 40 MG: 40 TABLET ORAL at 17:08

## 2024-03-12 RX ADMIN — CLOTRIMAZOLE 10 MG: 10 LOZENGE ORAL; TOPICAL at 21:08

## 2024-03-12 RX ADMIN — LORATADINE 10 MG: 10 TABLET ORAL at 08:09

## 2024-03-12 RX ADMIN — Medication 1 TABLET: at 08:09

## 2024-03-12 RX ADMIN — CYANOCOBALAMIN TAB 500 MCG 500 MCG: 500 TAB at 08:10

## 2024-03-12 RX ADMIN — TAMSULOSIN HYDROCHLORIDE 0.4 MG: 0.4 CAPSULE ORAL at 17:08

## 2024-03-12 RX ADMIN — ACETAMINOPHEN 650 MG: 325 TABLET ORAL at 21:08

## 2024-03-12 NOTE — PROGRESS NOTES
Progress Note - Thomas Chase 83 y.o. male MRN: [id number]    Unit/Bed#: [facility] 213-02 Encounter: [id number]        Subjective:   Patient seen and examined at bedside after reviewing the chart and discussing the case with the caring staff.      Patient examined at bedside.  Patient's blood pressure continues to stay low despite of receiving IV fluid 150 cc total at 75 cc an hour.  Patient denies any dizziness or lightheadedness.  It was noticed that patient's blood pressure on admission was within normal range but recently his blood pressures are staying on the lower numbers in the morning which improves as the day goes on.    Patient's orthostatic blood pressure which was done this morning showed blood pressure 80/52 in the lying position and 72/48 in a standing position without any symptoms.    Patient has received video swallow study today.  Official report is not available.    On review of patient's labs 3/11/2024 it was found that patient's vitamin D level was 53.6, B12 355, sed rate 58, CRP 51.6.  SPEP and FTA-ABS are still pending.    Physical Exam   Vitals: Blood pressure (!) 72/48, pulse 77, temperature 97.8 °F (36.6 °C), temperature source Temporal, resp. rate 16, height 6' (1.829 m), weight 59 kg (130 lb 1.1 oz), SpO2 93%.,Body mass index is 17.64 kg/m².  Constitutional: Patient in no acute distress.  HEENT: PERR, EOMI, MMM.  Cardiovascular: Normal rate and regular rhythm.    Pulmonary/Chest: Effort normal and breath sounds normal.   Abdomen: Soft, + BS, NT.  Bilateral feet:  Skin appears pink, warm, DP pulses 2+, no swelling    Assessment/Plan:  Thomas Chase is a(n) 83 y.o. year old male with left SDH and right SAH.     1.  Cardiac with hx of HTN, HLD/hypotension.  On pravastatin 40 mg daily (simvastatin nonformulary).  Continue to monitor BP.  I will discontinue IV fluids for now 3/12/2024.  Patient's orthostatic vitals did not showed significant drop in systolic blood pressure.  Patient is  asymptomatic.  I may consider giving normal saline bolus 250 cc/hr if patient's blood pressure continues to stay low with symptoms.  I will get UA to rule out UTI as patient's CRP and sed rate were slightly elevated.  Patient's CBC and CMP are within normal range.  I will put the patient on midodrine 2.5 mg twice daily 3/12/2024.  2.  Allergic rhinitis.  Patient is on loratadine 10 mg daily.  3.  Depression/anxiety.  Patient is on Zoloft 25 mg daily.  4.  Insomnia.  Patient is on melatonin 6 mg at bedtime.  5.  Thrush.  I will discontinue nystatin liquid swish and swallow 3/11/2024.  I will put the patient on clotrimazole lozenges 3/11/2024.  If patient is unable to do that may be I will restart nystatin liquid swish and swallow.  6.  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  Patient is mainly nonverbal.  7.  Urinary retention.  Urinary retention protocol.  I will reduce Flomax 0.4 mg daily 3/12/2024 due to the fact it may cause hypotension.  Urology consulted.  [place] catheter placed 2/22/24, removed on 2/28/24, placed again 3/1/24, removed again 3/7/24.    8.  Dysphagia.  Speech therapy following.  Dysphagia 1 puureed with nectar thick liquid.  Video swallow study done on 3/11/2024.  9.  Vitamin B12 deficiency.  Patient has been put on vitamin B12 500 mcg daily.  I am getting vitamin B12 levels for the patient.  10.  Pain and bowel management.  As per physiatrist.  11.  Intracranial hemorrhage.   Patient will be receiving intensive PT OT ST as per physiatrist.    Anticipated discharge date:  Thursday, 3/14/24  PCP:  RONY Diaz

## 2024-03-12 NOTE — NURSING NOTE
Finley clamped and UA sample collected from port and sent to lab. IV also stopped at this time per Dr. Paz. Will continue to monitor.

## 2024-03-12 NOTE — PROGRESS NOTES
03/12/24 1130   Pain Assessment   Pain Assessment Tool 0-10   Pain Score No Pain   Restrictions/Precautions   Precautions Aspiration;Bed/chair alarms;Cognitive;Fall Risk;[place];Hard of hearing   Weight Bearing Restrictions No   ROM Restrictions No   Exercise Tools   Other Exercise Tool 1 UE strengthening in bilat UE using 1# DB, x10: elbow flexion/extension, protraction/retraction, pronation/supination, shoulder flexion/extension; consistent visual and physical cues required for pt to complete correct exercise and count repetitions   Cognition   Overall Cognitive Status Impaired   Arousal/Participation Alert;Responsive;Cooperative   Attention Attends with cues to redirect   Orientation Level Oriented to person;Oriented to place   Memory Decreased short term memory;Decreased recall of recent events;Decreased recall of precautions   Following Commands Follows one step commands with increased time or repetition   Assessment   Treatment Assessment Pt seen for brief OT session this AM focusing on UE strengthening, activity tolerance, and direction following/cognition. Pt with difficulty mimicking OT's demonstration of exercises and responded more positively to physical cues; uncoordinated movements in RUE with frequent rest breaks. Pt's wife present during session and expressed happiness regarding pt's progress, especially with more verbalizations and attention. Pt's barriers to d/c include decreased strength throughout, decreased balance, impaired hearing, impaired cognition including safety awareness, problem solving, attention, comprehension, and expression, and decreased activity tolerance; all affect independence in self care and fxl transfers. Pt would benefit from continued skilled OT services in order to address listed barriers and prepare for safe d/c.   Prognosis Fair   Problem List Decreased strength;Decreased endurance;Impaired balance;Decreased cognition;Impaired judgement;Decreased safety  awareness;Impaired hearing;Decreased range of motion;Decreased coordination   Plan   Treatment/Interventions ADL retraining;Functional transfer training;LE strengthening/ROM;Therapeutic exercise;Endurance training;Cognitive reorientation;Patient/family training;Equipment eval/education;Compensatory technique education;OT   Progress Progressing toward goals   OT Therapy Minutes   OT Time In 1130   OT Time Out 1145   OT Total Time (minutes) 15   OT Mode of treatment - Individual (minutes) 15

## 2024-03-12 NOTE — CASE MANAGEMENT
CALVIN and ARC manager Neva met with patient and wife. Wife reporting she would prefer patient to go to skilled nursing for further rehab, before coming home. CM sent referral to [facility] and [facility] through [employer] via [employer], awaiting responses. Discharge date changed  to end of the week to Monday at this time.

## 2024-03-12 NOTE — PROGRESS NOTES
03/12/24 1200   Pain Assessment   Pain Assessment Tool 0-10   Pain Score No Pain   Pain Rating: FLACC (Rest) - Face 0   Pain Rating: FLACC (Rest) - Legs 0   Pain Rating: FLACC (Rest) - Activity 0   Pain Rating: FLACC (Rest) - Cry 0   Pain Rating: FLACC (Rest) - Consolability 0   Score: FLACC (Rest) 0   Restrictions/Precautions   Precautions Aspiration;Bed/chair alarms;Cognitive;Fall Risk;Aphasia;Finley;Hard of hearing;Supervision on toilet/commode   Comprehension   Comprehension (FIM) 3 - Understands basic info/conversation 50-74% of time  (via written direction in short phrases, gesture or lip reading)   Expression   Expression (FIM) 3 - Expresses basic info/needs 50-74% of time  (wants/needs via verbal 1-2 word phrases or gesture.)   Social Interaction   Social Interaction (FIM) 4 - Interacts 75-89% of time  (increased social greetings and attention throughout session.)   Problem Solving   Problem solving (FIM) 2 - Solves basic problems 25-49% of time   Memory   Memory (FIM) 2 - Recognizes, recalls/performs 25-49%   Memory Skills   Orientation Level Oriented to person;Oriented to place   Eating   Type of Assistance Needed Adaptive equipment;Set-up / clean-up;Supervision;Verbal cues   Eating CARE Score 4   Swallow Assessment   Swallow Treatment Assessment RN present provided dissolving medication for thrush. Written cues provided to patient and he was able to independently manage this medication until it dissolved without chewing or swallowing the tablet. Pt assessed with lunch tray. IV hydration initiated due to dehydration, adequate hydration is concerning at this time. Discussed ways of working towards increasing oral hydration at this time together with physician, dietician and patient's wife. Physician present together with wife/SLP discussing possible need for additional nutrition/hydration via PEG and calorie count was ordered. VBS results discussed and PACS images reviewed with wife. Diet recommendations  remain the same from most recent VBS on 3/11/24. Overall concern for swallow decline from previous VBS on 2/14/24 with recommendations at that time for pureed solids and thin liquids. Sigificant pharyngeal residue with all consistencies is a concern for diet tolerance across meals with increased aspiration risk. Strict aspiration precautions, swallow strategies and direct supervision with meals continues. He did very well with strained nectar thick liquid vegetable broth via teaspoon today across the entire bowl with cough x1. Discussed with dietician and soup will be added on all meal trays. Increased variable cough across remainder of mealtray puree/NTL in controlled 10CC provalve cup with weak cough ~x6.    SLP Therapy Minutes   SLP Time In 1145   SLP Time Out 1230   SLP Total Time (minutes) 45   SLP Mode of treatment - Individual (minutes) 45   SLP Mode of treatment - Concurrent (minutes) 0   SLP Mode of treatment - Group (minutes) 0   SLP Mode of treatment - Co-treat (minutes) 0   SLP Mode of Treatment - Total time(minutes) 45 minutes   SLP Cumulative Minutes 825

## 2024-03-12 NOTE — PROGRESS NOTES
PM&R PROGRESS NOTE:  Thomas Chase 83 y.o. male MRN: [id number]  Unit/Bed#: -02 Encounter: [id number]        **CHANGE IN REHAB DIAGNOSIS FROM PRE-ADMISSION SCREEN  Rehab Diagnosis: Impairment of mobility, safety, Activities of Daily Living (ADLs), and cognitive/communication skills due to Brain Dysfunction:  02.22  Traumatic, Closed Injury  Etiologic Diagnosis: L SDH, R SAH, L anterior hypodensity lateral temporal lobe    HPI: Thomas Chase is a 83 y.o. 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 on 2/13/2024 with increased weakness and mental status change for several days.  Unclear if patient had head trauma.  CT head showing a 3 mm left frontal subdural hemorrhage.  CT lumbar spine showing DJD.  Patient found to have dysphagia and was seen by speech therapy.  VFSS on 2/15/2024 cleared him for a puréed diet with thin liquids.  Patient was also started by psychiatry for depression and started on Zoloft 25 mg daily.  Patient sustained a rapid response and a unwitnessed fall with head trauma notable bump on head on 2/15/2024.  CT head showing a new small volume acute subarachnoid hemorrhage in the left frontal opercular region as well as new nondisplaced fracture of the zygomatic arch with soft tissue contusion, stable 3 mm subdural hemorrhage seen previously.  Patient transferred to [facility] for further evaluation.  Patient seen by neurology and neurosurgery.  CTA head and neck showing a left anterior hypodensity in the left anterior lateral temporal lobe with 2 punctate hyperdensities.  These represented possible nonhemorrhagic contusions or venous infarcts.  Less likely to represent ischemia or neoplasm.  Follow-up imaging recommended with CT head in 2-3 weeks.  CT  venogram showing patent dural venous sinuses.  Patient was cleared for DVT prophylaxis and started on Keppra for seizure prophylaxis.    Medical course complicated by suspected UTI based on UA and symptoms.  Patient was treated with antibiotics x 3 days.  After medical stabilization, patient found to have acute functional deficits from his baseline in mobility, self-care, speech swallow cognition therefore admitted to [facility] for acute inpatient rehabilitation.    SUBJECTIVE: Patient seen face-to-face today in speech therapy.  Doing very well eating his soup today which will be ordered on subsequent trays as he likes this.  Looking better today.  BP still low but asymptomatic -received 1 bag IV fluids.  Discussed with internal medicine whether midodrine is indicated and they are ordering Midodrine    VFSS results reviewed with patient and his wife.  Remains on a puréed diet with nectar thick liquids.  Patient with recent decreased oral intake, therefore ordering a calorie count which wife agrees with.      ASSESSMENT: Stable, progressing      PLAN:    Rehabilitation  Functional deficits: Aphasia, dysphagia, impaired cognition, impaired balance, impulsivity, poor safety awareness impaired mobility, self care    Continue current rehabilitation plan of care to maximize function.    Expected Discharge: TBD probable SNF referrals to be made as wife interested in this option.      DVT prophylaxis  Continue Lovenox      * Intracranial hemorrhage (HCC)  Assessment & Plan  Initial presentation on 2/13/2024 with increased weakness in the legs, mental status changes for several days  CT head showing a 3 mm left frontal subdural hemorrhage  Rapid response 2/15/2024 with fall and head trauma  CT head showing a new small volume acute subarachnoid hemorrhage in the left frontal opercular region and new nondisplaced fracture of the zygomatic arch with soft tissue contusion.    Transferred to [facility]  CTA head and neck and and  repeat CT head showing a left anterior hypodensity in the left anterior lateral temporal lobe with 2 punctate hyperdensities.  Differential diagnosis nonhemorrhagic contusions or venous infarcts.  Patient seen by neurology and neurosurgery  Conservative management  Cleared for DVT prophylaxis  Started on Keppra for seizure prophylaxis  Repeat CT head scheduled on Monday, 3/4/2024 -personally reviewed  Radiology read as follows:  Improving left anterior temporal lobe contusions with resolving edema. Stable subacute subdural hemorrhage along the anterior left frontal convexity. Resolved subarachnoid and intraventricular hemorrhages. Unchanged minimal hyperdensity beneath the left craniotomy flap. Stable right parafalcine subdural hygroma. There is no new intra or extra-axial hemorrhage.  Follow-up with neurosurgery on 3/6/2024 virtually completed.  Recommendations as follows: Neurosurgery will sign off, patient will follow-up in 2 weeks with repeat CT head   Follow-up with neurosurgery and neurology in outpatient clinic.    Behavior safety risk  Assessment & Plan  Patient demonstrating mild impulsivity in acute care  Fall x 2 in acute care  Falls at home in the past    Safety behavior plan going well and ARC.  No further impulsivity  Nursing and staff to provide close supervision near nursing station  Patient placed on 4 side rails (not as a restraint, patient and wife preference)  Monitor sleep-wake cycle  Avoid overstimulation: 1 visitor at a time, low lights, low sounds      Hematuria  Assessment & Plan  Started 3/2 - 3/3/2024  Minor, and more likely related to irritation from Finley catheter  Irrigation as needed per urology  Asymptomatic  H&H stable    Headache  Assessment & Plan  Resolved  Continue Tylenol 650 mg PRN     Severe protein-calorie malnutrition (HCC)  Assessment & Plan  BMI 17.64  Nutrition following  Optimize oral intake  Supplements ordered  Calorie count ordered    Dysphagia  Assessment &  Plan  VFSS completed 2/15/2024  Cleared for a puréed diet with nectar thick liquids  SLP to follow while at [facility]  Repeat VFSS 3/1//24: Mild oral dysphagia, Moderate oral-pharyngeal dysphagia  Recommendations:  Diet: Dysphagia 1 pureed   Liquids: Nectar       Low BP  Assessment & Plan  BP lower since 3/10/2024  Compression stockings and abdominal binder ordered  Internal medicine ordering IV fluid bolus.  I continued IVF x 1 L which was completed  BP continues to be very low especially in standing.   Labs reviewed - nothing to explain low BP  Internal medicine ordering a UA  Internal medicine ordering Midodrine 2.5 mg BID    Urinary retention  Assessment & Plan  Finley catheter removed on [facility]  Trial of void underway  Mixed pattern  Unfortunately required multiple straight catheterizations  Finley catheter replaced 3/1/24 per urology  Flomax increased to 0.8 mg every afternoon per urology  Trial of void 3/7/2024 - failed this  Finley re-inserted 3/8/24  Patient likely to require longer time prior to removal of Finley catheter  Internal medicine ordering a UA      Constipation due to neurogenic bowel  Assessment & Plan  Patient started on more aggressive bowel program yesterday  Good BM 3/7/2024 and 3/8/2024    Hearing loss  Assessment & Plan  Chronic hearing loss  Patient status post cochlear implant  MRI contraindicated    Hypercholesterolemia  Assessment & Plan  Continue simvastatin 20 mg daily (home dose)    UTI (urinary tract infection)-resolved as of 2/21/2024  Assessment & Plan  Suspected UTI based on UA and symptoms in acute care  Completed 3 days IV antibiotics        Appreciate IM consultants medical co-management.  Labs, medications, and imaging personally reviewed.      ROS:  A ten point review of systems was completed on 03/12/24 and pertinent positives are listed in subjective section. All other systems reviewed were negative.       OBJECTIVE:   BP (!) 72/48 (BP Location: Right arm)   Pulse 77   Temp 97.8 °F  (36.6 °C) (Temporal)   Resp 16   Ht 6' (1.829 m)   Wt 59 kg (130 lb 1.1 oz)   SpO2 93%   BMI 17.64 kg/m²     Physical Exam  Vitals and nursing note reviewed.   Constitutional:       General: He is not in acute distress.     Appearance: He is well-developed.      Comments: Thin appearing male    HENT:      Head: Normocephalic.      Nose: Nose normal.   Eyes:      Conjunctiva/sclera: Conjunctivae normal.   Cardiovascular:      Pulses: Normal pulses.   Pulmonary:      Effort: Pulmonary effort is normal.      Breath sounds: No wheezing.   Abdominal:      General: There is no distension.      Palpations: Abdomen is soft.   Genitourinary:     Comments: +Finley   Musculoskeletal:         General: No swelling.      Cervical back: Neck supple.   Skin:     General: Skin is warm.   Neurological:      Mental Status: He is alert.      Motor: Weakness present.      Comments: aphasia   Psychiatric:         Mood and Affect: Mood normal.          Lab Results   Component Value Date    WBC 5.64 03/11/2024    HGB 12.3 03/11/2024    HCT 39.0 03/11/2024    MCV 99 (H) 03/11/2024     03/11/2024     Lab Results   Component Value Date    SODIUM 141 03/11/2024    K 4.3 03/11/2024     03/11/2024    CO2 31 03/11/2024    BUN 15 03/11/2024    CREATININE 0.63 03/11/2024    GLUC 80 03/11/2024    CALCIUM 8.4 03/11/2024     Lab Results   Component Value Date    INR 1.02 06/02/2022    INR 1.10 05/29/2022    INR 1.04 05/28/2022    PROTIME 13.0 06/02/2022    PROTIME 13.7 05/29/2022    PROTIME 13.2 05/28/2022           Current Facility-Administered Medications:     acetaminophen (TYLENOL) tablet 650 mg, 650 mg, Oral, Q6H PRN, Daniel Clayton MD, 650 mg at 03/11/24 2129    bisacodyl (DULCOLAX) rectal suppository 10 mg, 10 mg, Rectal, Daily PRN, Darwin Paz MD    clotrimazole (MYCELEX) davy 10 mg, 10 mg, Oral, 5x Daily, Darwin Paz MD, 10 mg at 03/12/24 1328    cyanocobalamin (VITAMIN B-12) tablet 500 mcg, 500 mcg, Oral, Daily,  Darwin Paz MD, 500 mcg at 03/12/24 0810    enoxaparin (LOVENOX) subcutaneous injection 40 mg, 40 mg, Subcutaneous, Q24H JEANNIE, Alexandrea Lugo MD, 40 mg at 03/12/24 0809    lactulose (CHRONULAC) oral solution 20 g, 20 g, Oral, BID PRN, Darwin Paz MD, 20 g at 03/05/24 2107    loratadine (CLARITIN) tablet 10 mg, 10 mg, Oral, Daily, Amada L Dagnall, PA-C, 10 mg at 03/12/24 0809    meclizine (ANTIVERT) tablet 25 mg, 25 mg, Oral, Q8H PRN, Amada L Dagnall, PA-C    melatonin tablet 6 mg, 6 mg, Oral, HS, Amada L Dagnall, PA-C, 6 mg at 03/11/24 2129    midodrine (PROAMATINE) tablet 2.5 mg, 2.5 mg, Oral, BID AC, Darwin Paz MD    multivitamin-minerals (CENTRUM) tablet 1 tablet, 1 tablet, Oral, Daily, Amada L Dagnall, PA-C, 1 tablet at 03/12/24 0809    polyethylene glycol (MIRALAX) packet 17 g, 17 g, Oral, Daily PRN, Ashley Depadua, MD, 17 g at 03/05/24 1740    pravastatin (PRAVACHOL) tablet 40 mg, 40 mg, Oral, Daily With Dinner, Amada Coughlinnall, PA-C, 40 mg at 03/11/24 1609    senna (SENOKOT) tablet 8.6 mg, 1 tablet, Oral, HS, Alexandrea Lugo MD, 8.6 mg at 03/11/24 2129    sertraline (ZOLOFT) tablet 25 mg, 25 mg, Oral, Daily, Amada L Dagnall, PA-C, 25 mg at 03/12/24 0810    tamsulosin (FLOMAX) capsule 0.4 mg, 0.4 mg, Oral, Daily With Dinner, Darwin Paz MD    Past Medical History:   Diagnosis Date    Arthritis     Encounter for general adult medical examination without abnormal findings 03/20/2019    Fall 11/03/2022    Hyperlipidemia     Macular degeneration, wet (HCC)     Urinary retention 06/02/2022       Patient Active Problem List    Diagnosis Date Noted    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    Behavior safety risk 02/20/2024    Hematuria 03/04/2024    Headache 02/20/2024    Bilateral lower extremity weakness 02/17/2024    Abnormal CT scan, lumbar spine 02/15/2024    Severe protein-calorie malnutrition (HCC) 02/14/2024    Debility 02/13/2024    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    Dysphagia  11/21/2023    Macular degeneration, age related 02/27/2023    Traumatic subdural hemorrhage with loss of consciousness of unspecified duration, initial encounter ([facility]) 02/27/2023    Sensorineural hearing loss (SNHL), bilateral 08/18/2022    Balance disorder 06/28/2022    Abnormal echocardiogram 06/27/2022    Right bundle-branch block 06/27/2022    Moderate protein-calorie malnutrition ([facility]) 06/19/2022    Low BP 06/19/2022    Diastolic dysfunction 06/19/2022    EKG abnormalities 06/19/2022    Constipation due to neurogenic bowel 06/02/2022    Urinary retention 06/02/2022    SDH (subdural hematoma) ([facility]) 05/27/2022    Primary osteoarthritis of left knee 05/17/2022    Hygroma 04/26/2022    Right hand pain     Carpal tunnel syndrome on right     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      ** Please Note:  voice to text software may have been used in the creation of this document. Although proof errors in transcription or interpretation are a potential of such software**

## 2024-03-12 NOTE — PROGRESS NOTES
03/12/24 0900   Pain Assessment   Pain Assessment Tool 0-10   Pain Score No Pain   Restrictions/Precautions   Precautions Aspiration;Bed/chair alarms;Cognitive;Fall Risk;Finley;Hard of hearing;Impulsive;Supervision on toilet/commode   Cognition   Overall Cognitive Status Impaired   Arousal/Participation Alert;Responsive;Cooperative   Attention Attends with cues to redirect   Orientation Level Oriented to person;Oriented to place   Memory Decreased short term memory;Decreased recall of recent events;Decreased recall of precautions   Following Commands Follows one step commands with increased time or repetition   Sit to Stand   Type of Assistance Needed Incidental touching   Comment cg using RW with increased visual/tactile cues for hand placement and cues for sequence   Sit to Stand CARE Score 4   Bed-Chair Transfer   Type of Assistance Needed Incidental touching   Comment cg using RW with increased visual/tactile cues for hand placement and cues for sequence   Chair/Bed-to-Chair Transfer CARE Score 4   Walk 10 Feet   Type of Assistance Needed Incidental touching   Comment cg level and unlevel surfaces with RW; increased visual/tactile cues for sequence, direction, and management of Finley and IV tubing   Walk 10 Feet CARE Score 4   Walk 50 Feet with Two Turns   Type of Assistance Needed Incidental touching   Comment cg level and unlevel surfaces with RW; increased visual/tactile cues for sequence, direction, and management of Finley and IV tubing   Walk 50 Feet with Two Turns CARE Score 4   Walk 150 Feet   Type of Assistance Needed Incidental touching   Comment cg level and unlevel surfaces with RW; increased visual/tactile cues for sequence, direction, and management of Finley and IV tubing   Walk 150 Feet CARE Score 4   Walking 10 Feet on Uneven Surfaces   Type of Assistance Needed Incidental touching   Comment cg level and unlevel surfaces with RW; increased visual/tactile cues for sequence, direction, and  management of Finley and IV tubing   Walking 10 Feet on Uneven Surfaces CARE Score 4   Ambulation   Primary Mode of Locomotion Prior to Admission Walk   Distance Walked (feet) 179 ft  (179' 152')   Assist Device Roller Walker   Gait Pattern Inconsistant Maria Victoria;Slow Maria Victoria;Decreased foot clearance;Narrow ILIANA;Improper weight shift;Forward Flexion   Limitations Noted In Balance;Device Management;Endurance;Posture;Safety;Speed;Strength   Provided Assistance with: Balance;Direction   Walk Assist Level Contact Guard   Findings cg level and unlevel surfaces with RW; increased visual/tactile cues for sequence, direction, and management of Finley and IV tubing   Does the patient walk? 2. Yes   Curb or Single Stair   Style negotiated Single stair   Type of Assistance Needed Incidental touching   Comment cg; increased visual/tactile cues for sequence and direction as well as management of Finley/IV tubing   1 Step (Curb) CARE Score 4   4 Steps   Type of Assistance Needed Incidental touching   Comment cg; increased visual/tactile cues for sequence and direction as well as management of Finley/IV tubing   4 Steps CARE Score 4   12 Steps   Type of Assistance Needed Incidental touching   Comment cg; increased visual/tactile cues for sequence and direction as well as management of Finley/IV tubing   12 Steps CARE Score 4   Stairs   Type Stairs   # of Steps 14   Weight Bearing Precautions Fall Risk;WBAT   Assist Devices Bilateral Rail   Findings cg; increased visual/tactile cues for sequence and direction as well as management of Finley/IV tubing   Therapeutic Interventions   Strengthening supine and seated ther ex   Balance gait and transfer training   Other stair negotiation   Assessment   Treatment Assessment Patient agreeble to therapy session.   No pain reported.    IV still in place.   Orthostatic BPs taken with Dynamap and manually.   See below for specific meaurements.    Continues to require increased visual/tactile cues for  general safety, task sequence, and direction.   Completed ther ex for general LE strengthening; gait and transfer training focusing on sequence and technique for improved balance and safety with functional mobility using walker.  Negotiated steps with 2 handrails and increased cues for sequence, direction, and management of Finley and IV tubing.   PT Barriers   Physical Impairment Decreased strength;Decreased range of motion;Decreased endurance;Impaired balance;Decreased mobility;Decreased coordination;Decreased cognition;Impaired judgement;Decreased safety awareness;Impaired hearing   Functional Limitation Wheelchair management;Walking;Transfers;Standing;Stair negotiation;Ramp negotiation;Car transfers   Plan   Treatment/Interventions Functional transfer training;LE strengthening/ROM;Elevations;Therapeutic exercise;Gait training   Progress Progressing toward goals   PT Therapy Minutes   PT Time In 0900   PT Time Out 1030   PT Total Time (minutes) 90   PT Mode of treatment - Individual (minutes) 90   PT Mode of treatment - Concurrent (minutes) 0   PT Mode of treatment - Group (minutes) 0   PT Mode of treatment - Co-treat (minutes) 0   PT Mode of Treatment - Total time(minutes) 90 minutes   PT Cumulative Minutes 1486   Therapy Time missed   Time missed? No       Dynamap:  Supine  80/50  Seated  75/47  Standing 73/49    Manual:  Supine  80/52  Seated  80/54  Standing 72/48

## 2024-03-12 NOTE — PROGRESS NOTES
03/11/24 1200   Pain Assessment   Pain Assessment Tool 0-10   Pain Score No Pain   Pain Rating: FLACC (Rest) - Face 0   Pain Rating: FLACC (Rest) - Legs 0   Pain Rating: FLACC (Rest) - Activity 0   Pain Rating: FLACC (Rest) - Cry 0   Pain Rating: FLACC (Rest) - Consolability 0   Score: FLACC (Rest) 0   Swallow Assessment   Swallow Treatment Assessment Pt assessed with meal tray. Better attention and use of compensatory strategies this date vs. last session. Inconsistent s.s of aspiration variable cough across meal inconsistent with nectar and puree x5 cough. Strict 1:;1 supervision and reminder for use of strategies. Pending VBS results completed from this date.   SLP Therapy Minutes   SLP Time In 1200   SLP Time Out 1230   SLP Total Time (minutes) 30   SLP Mode of treatment - Individual (minutes) 30   SLP Mode of treatment - Concurrent (minutes) 0   SLP Mode of treatment - Group (minutes) 0   SLP Mode of treatment - Co-treat (minutes) 0   SLP Mode of Treatment - Total time(minutes) 30 minutes   SLP Cumulative Minutes 780

## 2024-03-12 NOTE — PLAN OF CARE
Problem: INFECTION - ADULT  Goal: Absence or prevention of progression during hospitalization  Description: INTERVENTIONS:  - Assess and monitor for signs and symptoms of infection  - Monitor lab/diagnostic results  - Monitor all insertion sites, i.e. indwelling lines, tubes, and drains  - Monitor endotracheal if appropriate and nasal secretions for changes in amount and color  - [place] appropriate cooling/warming therapies per order  - Administer medications as ordered  - Instruct and encourage patient and family to use good hand hygiene technique  - Identify and instruct in appropriate isolation precautions for identified infection/condition  Outcome: Progressing  Goal: Absence of fever/infection during neutropenic period  Description: INTERVENTIONS:  - Monitor WBC    Outcome: Progressing     Problem: PAIN - ADULT  Goal: Verbalizes/displays adequate comfort level or baseline comfort level  Description: Interventions:  - Encourage patient to monitor pain and request assistance  - Assess pain using appropriate pain scale  - Administer analgesics based on type and severity of pain and evaluate response  - Implement non-pharmacological measures as appropriate and evaluate response  - Consider cultural and social influences on pain and pain management  - Notify physician/advanced practitioner if interventions unsuccessful or patient reports new pain  Outcome: Progressing

## 2024-03-12 NOTE — PROGRESS NOTES
03/12/24 0700   Pain Assessment   Pain Assessment Tool 0-10   Pain Score No Pain   Restrictions/Precautions   Precautions Aspiration;Bed/chair alarms;Cognitive;Fall Risk;[place];Hard of hearing   Weight Bearing Restrictions No   ROM Restrictions No   Eating   Type of Assistance Needed Supervision   Physical Assistance Level No physical assistance   Comment pt self fed all desired foods with supervision and occasional visual cues to continue eating, pt ate approx 75% of food on tray; I'ly reached for provalve cup; coughing noted <25% of the time   Eating CARE Score 4   Oral Hygiene   Type of Assistance Needed Supervision   Physical Assistance Level No physical assistance   Oral Hygiene CARE Score 4   Grooming   Able To Comb/Brush Hair;Wash/Dry Face;Brush/Clean Teeth   Limitation Noted In Problem Solving;Safety;Sequencing   Shower/Bathe Self   Type of Assistance Needed Physical assistance   Physical Assistance Level 26%-50%   Comment assist for buttocks, bilat lower LE; [place]ley care completed by OT   Shower/Bathe Self CARE Score 3   Bathing   Assessed Bath Style Sponge Bath   Anticipated D/C Bath Style Sponge Bath;Shower   Able to Gather/Transport No   Able to Wash/Rinse/Dry (body part) Right Arm;Left Arm;L Upper Leg;R Upper Leg;Chest;Abdomen   Limitations Noted in Balance;Endurance;ROM;Safety;Strength;Problem Solving;Sequencing   Positioning Seated;Standing   Upper Body Dressing   Type of Assistance Needed Physical assistance   Physical Assistance Level 25% or less   Comment assist to doff flannel from RUE and don new flannel over bilat UE, don t-shirt over RUE   Upper Body Dressing CARE Score 3   Lower Body Dressing   Type of Assistance Needed Physical assistance   Physical Assistance Level 76% or more   Comment able to pull underwear up over hips when standing   Lower Body Dressing CARE Score 2   Putting On/Taking Off Footwear   Type of Assistance Needed Physical assistance   Physical Assistance Level 76% or more    Comment able to present bilat feet to OT to doff/don socks and TEDs   Putting On/Taking Off Footwear CARE Score 2   Dressing/Undressing Clothing   Remove UB Clothes Pullover Shirt;Jacket   Don UB Clothes Pullover Shirt;Jacket   Remove LB Clothes Pants;Undergarment;Socks   Don LB Clothes Pants;Undergarment;Socks;TEDs   Limitations Noted In Balance;Endurance;Problem Solving;Safety;Sequencing;Strength   Positioning Supported Sit   Lying to Sitting on Side of Bed   Type of Assistance Needed Supervision   Physical Assistance Level No physical assistance   Lying to Sitting on Side of Bed CARE Score 4   Sit to Stand   Type of Assistance Needed Incidental touching   Comment CG   Sit to Stand CARE Score 4   Bed-Chair Transfer   Type of Assistance Needed Incidental touching   Comment CG   Chair/Bed-to-Chair Transfer CARE Score 4   Transfer Bed/Chair/Wheelchair   Limitations Noted In Balance;Endurance;Problem Solving;Sequencing;LE Strength   Adaptive Equipment Roller Walker   Exercise Tools   Other Exercise Tool 1 placed popsicle sticks in matching pockets with R hand after following prompts on cards, pt completed with almost 100% accuracy and only two errors corrected easily by OT; pt did require occasional visual cues to find correct color of stick or pocket in which to place sticks   Cognition   Overall Cognitive Status Impaired   Arousal/Participation Alert;Cooperative   Attention Attends with cues to redirect   Orientation Level Oriented to person;Oriented to place  (cues for day of week)   Memory Decreased short term memory;Decreased recall of recent events;Decreased recall of precautions   Following Commands Follows one step commands with increased time or repetition   Assessment   Treatment Assessment Pt agreeable to OT session this AM. Received lying supine in bed. ADL session completed; current LOF and details listed in respective sections. Pt is overall maxA LB dressing, [place] bathing and UB dressing, supervision  oral care and breakfast tray completion; fxl transfers overall CG/supervision with RW. Pt responded with greater success to visual cues encouraging increased participation in tasks, especially for LB dressing. Pt completed fine motor/cognitive activity after ADL with good tolerance and attention. Supervision required for breakfast tray at end of session with less coughing this session than in previous sessions. Pt's barriers to d/c include decreased strength throughout, decreased balance, impaired hearing, impaired cognition including safety awareness, problem solving, attention, comprehension, and expression, and decreased activity tolerance; all affect independence in self care and fxl transfers. Pt would benefit from continued skilled OT services in order to address listed barriers and prepare for safe d/c.   Prognosis Fair   Problem List Decreased strength;Decreased endurance;Impaired balance;Decreased cognition;Impaired judgement;Decreased safety awareness;Impaired hearing;Decreased range of motion;Decreased coordination   Plan   Treatment/Interventions ADL retraining;Functional transfer training;LE strengthening/ROM;Therapeutic exercise;Endurance training;Cognitive reorientation;Patient/family training;Equipment eval/education;Compensatory technique education;OT   Progress Progressing toward goals   OT Therapy Minutes   OT Time In 0700   OT Time Out 0832   OT Total Time (minutes) 92   OT Mode of treatment - Individual (minutes) 92

## 2024-03-12 NOTE — NUTRITION
03/12/24 1428   Biochemical Data,Medical Tests, and Procedures   Biochemical Data/Medical Tests/Procedures Lab values reviewed;Meds reviewed   Labs (Comment) 3/11/24 Total Protein 6.2, , Albumin 3.1   Meds (Comment) mycelex, vitamin B12, lovenox, claritin, melatonin, proamatine, centrum, pravachol, senokot, zoloftflomax, sodium chloride, tylenol, chronulac, miralax   Speech Therapy Recommendations (Comment) Continues to receive speech therapy.   Nutrition-Focused Physical Exam   Nutrition-Focused Physical Exam Findings RN skin assessment reviewed;No skin issues documented  (No pressure areas noted.)   Nutrition-Focused Physical Exam Findings No edema noted. LBM 3/8.   Medical-Related Concerns PMH reviewed.   Adequacy of Intake   Nutrition Modality PO   Feeding Route   PO Independent   Current PO Intake   Current Diet Order Dysphagia 1, NTL extra sauce/gravy.   Nutrition Supplements Magic Cup;Mighty Shake  (Mighty Shake TID, Magic Cup BID.)   Current Meal Intake 50-75%;%   Intake Supplements 25-50%;50-75%;%   Estimated calorie intake compared to estimated need Appears to be meeting minimum nutrient needs.   PES Statement   Problem Continue previous diagnosis   Recommendations/Interventions   Malnutrition/BMI Present Yes  (As previously noted.)   Summary Nutrition follow-up assessment. Dysphagia 1, NTL extra sauce/gravy. B: oatmeal, yogurt, applesauce, pudding. L/D: yogurt, applesauce, pudding. Mightyshake TID. Magic cup BID. Meal completions %. Supplement intake varies. 3/11/24 130# BMI 17.64 4# weight gain from admission 2/20/24 126#. No edema. Skin intact. Indwelling catheter present. LBM 3/8. Oriented to person/place. Deaf. Patient did well with strained nectar thick soup 3/12 per ST. Per spouse patient has been doing well with eating. Recommend adding strained nectar thick soup BID, lunch and dinner. Recommend adding extra pureed portion with meals. Calorie count ordered. Will continue  to monitor intake.   Interventions/Recommendations Supplement continue;Continue current diet order;Other (Specify)  (Add strained nectar thick soup BID, lunch and dinner. Add extra pureed portion with meals.)   Education Assessment   Education Patient/caregiver not appropriate for education at this time   Patient Nutrition Goals   Goal Avoid weight loss;Tolerate PO diet;Meet PO needs   Goal Status Extended;Met   Timeframe to complete goal by next f/u   Nutrition Complexity Risk   Nutrition complexity level Moderate risk   Follow up date 03/18/24

## 2024-03-12 NOTE — NURSING NOTE
Bps checked automatic and manually orthostatic. Results messaged to Dr. Paz. Patient remains asymptomatic. IV remains infusing at 75/hr. Will continue to monitor.

## 2024-03-12 NOTE — NURSING NOTE
Patient transfers assist x 1 with walker. Sitting in recliner for all meals. Able to feed self. Calorie count started. Patient eating almost all of his meals. Minimal coughing with meals. Able to tolerate lozenges when given. BP recheck prior to dinner much improved. IV maintained to left arm. Reports no pain. Finley catheter in place draining yellow urine. Finley cath care completed. Dressing changed to sacrum. Skin remains intact. Will continue to monitor and follow plan of care.

## 2024-03-13 ENCOUNTER — TELEPHONE (OUTPATIENT)
Dept: NEUROLOGY | Facility: CLINIC | Age: 84
End: 2024-03-13

## 2024-03-13 PROCEDURE — 87077 CULTURE AEROBIC IDENTIFY: CPT | Performed by: FAMILY MEDICINE

## 2024-03-13 PROCEDURE — 97537 COMMUNITY/WORK REINTEGRATION: CPT

## 2024-03-13 PROCEDURE — 87147 CULTURE TYPE IMMUNOLOGIC: CPT | Performed by: FAMILY MEDICINE

## 2024-03-13 PROCEDURE — 97535 SELF CARE MNGMENT TRAINING: CPT

## 2024-03-13 PROCEDURE — 87186 SC STD MICRODIL/AGAR DIL: CPT | Performed by: FAMILY MEDICINE

## 2024-03-13 PROCEDURE — 92526 ORAL FUNCTION THERAPY: CPT | Performed by: NURSE PRACTITIONER

## 2024-03-13 PROCEDURE — 97530 THERAPEUTIC ACTIVITIES: CPT

## 2024-03-13 PROCEDURE — 97110 THERAPEUTIC EXERCISES: CPT

## 2024-03-13 PROCEDURE — 99233 SBSQ HOSP IP/OBS HIGH 50: CPT | Performed by: PHYSICAL MEDICINE & REHABILITATION

## 2024-03-13 PROCEDURE — 97116 GAIT TRAINING THERAPY: CPT

## 2024-03-13 PROCEDURE — 87086 URINE CULTURE/COLONY COUNT: CPT | Performed by: FAMILY MEDICINE

## 2024-03-13 RX ADMIN — Medication 6 MG: at 21:37

## 2024-03-13 RX ADMIN — SERTRALINE HYDROCHLORIDE 25 MG: 25 TABLET ORAL at 08:43

## 2024-03-13 RX ADMIN — CLOTRIMAZOLE 10 MG: 10 LOZENGE ORAL; TOPICAL at 13:48

## 2024-03-13 RX ADMIN — PRAVASTATIN SODIUM 40 MG: 40 TABLET ORAL at 17:45

## 2024-03-13 RX ADMIN — Medication 1 TABLET: at 08:43

## 2024-03-13 RX ADMIN — SENNOSIDES 8.6 MG: 8.6 TABLET, FILM COATED ORAL at 21:37

## 2024-03-13 RX ADMIN — ENOXAPARIN SODIUM 40 MG: 40 INJECTION SUBCUTANEOUS at 08:43

## 2024-03-13 RX ADMIN — CLOTRIMAZOLE 10 MG: 10 LOZENGE ORAL; TOPICAL at 21:38

## 2024-03-13 RX ADMIN — LORATADINE 10 MG: 10 TABLET ORAL at 08:43

## 2024-03-13 RX ADMIN — CLOTRIMAZOLE 10 MG: 10 LOZENGE ORAL; TOPICAL at 07:19

## 2024-03-13 RX ADMIN — CLOTRIMAZOLE 10 MG: 10 LOZENGE ORAL; TOPICAL at 17:45

## 2024-03-13 RX ADMIN — CYANOCOBALAMIN TAB 500 MCG 500 MCG: 500 TAB at 08:43

## 2024-03-13 RX ADMIN — TAMSULOSIN HYDROCHLORIDE 0.4 MG: 0.4 CAPSULE ORAL at 17:45

## 2024-03-13 RX ADMIN — MIDODRINE HYDROCHLORIDE 2.5 MG: 2.5 TABLET ORAL at 07:19

## 2024-03-13 RX ADMIN — CLOTRIMAZOLE 10 MG: 10 LOZENGE ORAL; TOPICAL at 11:49

## 2024-03-13 NOTE — PROGRESS NOTES
PM&R PROGRESS NOTE:  Thomas Chase 83 y.o. male MRN: [id number]  Unit/Bed#: -02 Encounter: [id number]        **CHANGE IN REHAB DIAGNOSIS FROM PRE-ADMISSION SCREEN  Rehab Diagnosis: Impairment of mobility, safety, Activities of Daily Living (ADLs), and cognitive/communication skills due to Brain Dysfunction:  02.22  Traumatic, Closed Injury  Etiologic Diagnosis: L SDH, R SAH, L anterior hypodensity lateral temporal lobe    HPI: Thomas Chase is a 83 y.o. male with past medical history significant for previous fall with resultant traumatic brain injury-subdural hematoma in 2022 status post left craniotomy, chronic aphasia, hard of hearing with cochlear implant in place, hyperlipidemia who presented to the [facility] on 2/13/2024 with increased weakness and mental status change for several days.  Unclear if patient had head trauma.  CT head showing a 3 mm left frontal subdural hemorrhage.  CT lumbar spine showing DJD.  Patient found to have dysphagia and was seen by speech therapy.  VFSS on 2/15/2024 cleared him for a puréed diet with thin liquids.  Patient was also started by psychiatry for depression and started on Zoloft 25 mg daily.  Patient sustained a rapid response and a unwitnessed fall with head trauma notable bump on head on 2/15/2024.  CT head showing a new small volume acute subarachnoid hemorrhage in the left frontal opercular region as well as new nondisplaced fracture of the zygomatic arch with soft tissue contusion, stable 3 mm subdural hemorrhage seen previously.  Patient transferred to [facility] for further evaluation.  Patient seen by neurology and neurosurgery.  CTA head and neck showing a left anterior hypodensity in the left anterior lateral temporal lobe with 2 punctate hyperdensities.  These represented possible nonhemorrhagic contusions or venous infarcts.  Less likely to represent ischemia or neoplasm.  Follow-up imaging recommended with CT head in 2-3 weeks.  CT  venogram showing patent dural venous sinuses.  Patient was cleared for DVT prophylaxis and started on Keppra for seizure prophylaxis.    Medical course complicated by suspected UTI based on UA and symptoms.  Patient was treated with antibiotics x 3 days.  After medical stabilization, patient found to have acute functional deficits from his baseline in mobility, self-care, speech swallow cognition therefore admitted to [facility] for acute inpatient rehabilitation.    SUBJECTIVE: Patient seen face-to-face post therapies.  His wife is at the bedside.  Blood pressure is much improved today.  Patient denies pain or other issues.  Comfortable.  Encouraging liquid oral intake.  Eating about 75-90% of all his meals      ASSESSMENT: Stable, progressing      PLAN:    Rehabilitation  Functional deficits: Aphasia, dysphagia, impaired cognition, impaired balance, impulsivity, poor safety awareness impaired mobility, self care    Continue current rehabilitation plan of care to maximize function.    I personally attended, reviewed, and discussed medical and functional updates in team conference today.  Please refer to advance care planning note for details.  Expected Discharge: TBD probable SNF referrals to be made as wife interested in this option.  Aim for Friday 3/15/24    Current Function:  Physical Therapy Occupational Therapy Speech Therapy   Weight Bearing Status: Weight Bearing as Tolerated  Transfers: Incidental Touching, Supervision (increased visual/tactile cues)  Bed Mobility: Supervision (bed rails; increased visual/tactile cues)  Amulation Distance (ft): 190 feet  Ambulation: Incidental Touching (increased visual/tactile cues for sequence/technique/direction)  Assistive Device for Ambulation: Roller Walker  Wheelchair Mobility Distance: 133 ft  Wheelchair Mobility: Maximum Assistance (max visual and tactile cues)  Number of Stairs: 14  Assistive Device for Stairs: Bilateral Hand Rails  Stair Assistance:  Incidental Touching (increased visual/tactile cues for sequence/technique)  Ramp: Incidental Touching (increased visual/tactile cues for sequence/technique)  Assistive Device for Ramp: Roller Walker  Discharge Recommendations: Home with:  DC Home with:: 24 Hour Assisteance, Family Support, First Floor Setup, Home Physical Therapy (vs alternate placement)   Eating: Supervision  Grooming: Supervision  Bathing: Minimal Assistance  Bathing: Minimal Assistance  Upper Body Dressing: Minimal Assistance  Lower Body Dressing: Maximum Assistance  Toileting: Maximum Assistance  Tub/Shower Transfer: Minimal Assistance (during dry transfer practice)  Toilet Transfer: Incidental Touching  Cognition: Exceptions to WNL  Cognition: Decreased Memory, Decreased Executive Functions, Decreased Attention, Decreased Comprehension  Orientation: Person, Place   Mode of Communication: Non-verbal  Speech/Language: Expressive Aphasia  Cognition: Exceptions to WNL  Cognition: Decreased Memory, Decreased Executive Functions, Decreased Attention, Decreased Comprehension, Decreased Safety  Orientation: Person, Place, Time  Swallowing: Exceptions to WNL  Swallowing: Oral Dysphagia, Pharyngeal Dysphagia, Aspiration Risk  Diet Recommendations: Level 1/ThangeAmbrosio Thick  Discharge Recommendations:  (home with 24/7 supervision vs. SNF)  DC Home with::  (continued speech therapy services recommended)       DVT prophylaxis  Continue Lovenox      * Intracranial hemorrhage (HCC)  Assessment & Plan  Initial presentation on 2/13/2024 with increased weakness in the legs, mental status changes for several days  CT head showing a 3 mm left frontal subdural hemorrhage  Rapid response 2/15/2024 with fall and head trauma  CT head showing a new small volume acute subarachnoid hemorrhage in the left frontal opercular region and new nondisplaced fracture of the zygomatic arch with soft tissue contusion.    Transferred to [facility]  CTA head and neck and and repeat CT  head showing a left anterior hypodensity in the left anterior lateral temporal lobe with 2 punctate hyperdensities.  Differential diagnosis nonhemorrhagic contusions or venous infarcts.  Patient seen by neurology and neurosurgery  Conservative management  Cleared for DVT prophylaxis  Started on Keppra for seizure prophylaxis  Repeat CT head scheduled on Monday, 3/4/2024 -personally reviewed  Radiology read as follows:  Improving left anterior temporal lobe contusions with resolving edema. Stable subacute subdural hemorrhage along the anterior left frontal convexity. Resolved subarachnoid and intraventricular hemorrhages. Unchanged minimal hyperdensity beneath the left craniotomy flap. Stable right parafalcine subdural hygroma. There is no new intra or extra-axial hemorrhage.  Follow-up with neurosurgery on 3/6/2024 virtually completed.  Recommendations as follows: Neurosurgery will sign off, patient will follow-up in 2 weeks with repeat CT head   Follow-up with neurosurgery and neurology in outpatient clinic.    Behavior safety risk  Assessment & Plan  Patient demonstrating mild impulsivity in acute care  Fall x 2 in acute care  Falls at home in the past    Safety behavior plan going well and ARC.  No further impulsivity  Nursing and staff to provide close supervision near nursing station  Patient placed on 4 side rails (not as a restraint, patient and wife preference)  Monitor sleep-wake cycle  Avoid overstimulation: 1 visitor at a time, low lights, low sounds      Hematuria  Assessment & Plan  Started 3/2 - 3/3/2024  Minor, and more likely related to irritation from Finley catheter  Irrigation as needed per urology  Asymptomatic  H&H stable    Headache  Assessment & Plan  Resolved  Continue Tylenol 650 mg PRN     Severe protein-calorie malnutrition (HCC)  Assessment & Plan  BMI 17.64  Nutrition following  Optimize oral intake  Supplements ordered  Calorie count ordered - he is eating % of meals, but liquid  intake is minimal  Maintain IV  IVF PRN    Dysphagia  Assessment & Plan  VFSS completed 2/15/2024  Cleared for a puréed diet with nectar thick liquids  SLP to follow while at [facility]  Repeat VFSS 3/1//24: Mild oral dysphagia, Moderate oral-pharyngeal dysphagia  Recommendations:  Diet: Dysphagia 1 pureed   Liquids: Nectar   Has difficulty with liquid intake - encourage sips throughout day      Low BP  Assessment & Plan  BP lower since 3/10/2024  Compression stockings and abdominal binder ordered  Internal medicine ordering IV fluid bolus.  I continued IVF x 1 L which was completed  BP continues to be very low especially in standing.   Labs reviewed - nothing to explain low BP  Internal medicine ordering a UA - equivocal  Internal medicine ordering Midodrine 2.5 mg BID (3/12/24)  Improved 3/13/24    Urinary retention  Assessment & Plan  Finley catheter removed on [facility]  Trial of void underway  Mixed pattern  Unfortunately required multiple straight catheterizations  Finley catheter replaced 3/1/24 per urology  Flomax increased to 0.8 mg every afternoon per urology  Trial of void 3/7/2024 - failed this  Finley re-inserted 3/8/24  Patient likely to require longer time prior to removal of Finley catheter  Internal medicine ordering a UA      Constipation due to neurogenic bowel  Assessment & Plan  Patient started on more aggressive bowel program yesterday  BM 3/12/24    Hearing loss  Assessment & Plan  Chronic hearing loss  Patient status post cochlear implant  MRI contraindicated    Hypercholesterolemia  Assessment & Plan  Continue simvastatin 20 mg daily (home dose)    UTI (urinary tract infection)-resolved as of 2/21/2024  Assessment & Plan  Suspected UTI based on UA and symptoms in acute care  Completed 3 days IV antibiotics        Appreciate IM consultants medical co-management.  Labs, medications, and imaging personally reviewed.      ROS:  A ten point review of systems was completed on 03/13/24 and pertinent positives are  listed in subjective section. All other systems reviewed were negative.       OBJECTIVE:   /65 (BP Location: Right arm)   Pulse 84   Temp 98.8 °F (37.1 °C) (Temporal)   Resp 17   Ht 6' (1.829 m)   Wt 59 kg (130 lb 1.1 oz)   SpO2 94%   BMI 17.64 kg/m²     Physical Exam  Vitals and nursing note reviewed.   Constitutional:       General: He is not in acute distress.  HENT:      Head: Normocephalic and atraumatic.      Nose: Nose normal.      Mouth/Throat:      Mouth: Mucous membranes are moist.   Eyes:      Conjunctiva/sclera: Conjunctivae normal.   Cardiovascular:      Rate and Rhythm: Normal rate and regular rhythm.      Pulses: Normal pulses.   Pulmonary:      Effort: Pulmonary effort is normal.      Breath sounds: Normal breath sounds. No wheezing or rales.   Abdominal:      General: Bowel sounds are normal. There is no distension.      Palpations: Abdomen is soft.      Tenderness: There is no abdominal tenderness.   Genitourinary:     Comments: +Finley catheter  Musculoskeletal:         General: No swelling.      Cervical back: Neck supple.   Skin:     General: Skin is warm.   Neurological:      Mental Status: He is alert.      Motor: Weakness present.      Comments: Aphasia present   Psychiatric:         Mood and Affect: Mood normal.          Lab Results   Component Value Date    WBC 5.64 03/11/2024    HGB 12.3 03/11/2024    HCT 39.0 03/11/2024    MCV 99 (H) 03/11/2024     03/11/2024     Lab Results   Component Value Date    SODIUM 141 03/11/2024    K 4.3 03/11/2024     03/11/2024    CO2 31 03/11/2024    BUN 15 03/11/2024    CREATININE 0.63 03/11/2024    GLUC 80 03/11/2024    CALCIUM 8.4 03/11/2024     Lab Results   Component Value Date    INR 1.02 06/02/2022    INR 1.10 05/29/2022    INR 1.04 05/28/2022    PROTIME 13.0 06/02/2022    PROTIME 13.7 05/29/2022    PROTIME 13.2 05/28/2022           Current Facility-Administered Medications:     acetaminophen (TYLENOL) tablet 650 mg, 650 mg,  Oral, Q6H PRN, Daniel Clayton MD, 650 mg at 03/12/24 2108    bisacodyl (DULCOLAX) rectal suppository 10 mg, 10 mg, Rectal, Daily PRN, Darwin Paz MD    clotrimazole (MYCELEX) davy 10 mg, 10 mg, Oral, 5x Daily, Darwin Paz MD, 10 mg at 03/13/24 1348    cyanocobalamin (VITAMIN B-12) tablet 500 mcg, 500 mcg, Oral, Daily, Darwin Paz MD, 500 mcg at 03/13/24 0843    enoxaparin (LOVENOX) subcutaneous injection 40 mg, 40 mg, Subcutaneous, Q24H JEANNIE, Alexandrea Lugo MD, 40 mg at 03/13/24 0843    lactulose (CHRONULAC) oral solution 20 g, 20 g, Oral, BID PRN, Darwin Paz MD, 20 g at 03/05/24 2107    loratadine (CLARITIN) tablet 10 mg, 10 mg, Oral, Daily, Amada L Dagnall, PA-C, 10 mg at 03/13/24 0843    meclizine (ANTIVERT) tablet 25 mg, 25 mg, Oral, Q8H PRN, Amada L Dagnall, PA-C    melatonin tablet 6 mg, 6 mg, Oral, HS, Amada L Dagnall, PA-C, 6 mg at 03/12/24 2108    midodrine (PROAMATINE) tablet 2.5 mg, 2.5 mg, Oral, BID AC, Amada L Dagnall, PA-C, 2.5 mg at 03/13/24 0719    multivitamin-minerals (CENTRUM) tablet 1 tablet, 1 tablet, Oral, Daily, Amada L Dagnall, PA-C, 1 tablet at 03/13/24 0843    polyethylene glycol (MIRALAX) packet 17 g, 17 g, Oral, Daily PRN, Ashley Depadua, MD, 17 g at 03/05/24 1740    pravastatin (PRAVACHOL) tablet 40 mg, 40 mg, Oral, Daily With Dinner, Amada L Dagnall, PA-C, 40 mg at 03/12/24 1708    senna (SENOKOT) tablet 8.6 mg, 1 tablet, Oral, HS, Alexandrea Lugo, MD, 8.6 mg at 03/12/24 2108    sertraline (ZOLOFT) tablet 25 mg, 25 mg, Oral, Daily, Amada Epperson PA-C, 25 mg at 03/13/24 0843    tamsulosin (FLOMAX) capsule 0.4 mg, 0.4 mg, Oral, Daily With Dinner, Darwin Paz MD, 0.4 mg at 03/12/24 1708    Past Medical History:   Diagnosis Date    Arthritis     Encounter for general adult medical examination without abnormal findings 03/20/2019    Fall 11/03/2022    Hyperlipidemia     Macular degeneration, wet (HCC)     Urinary retention 06/02/2022       Patient  Active Problem List    Diagnosis Date Noted    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    Behavior safety risk 02/20/2024    Hematuria 03/04/2024    Headache 02/20/2024    Bilateral lower extremity weakness 02/17/2024    Abnormal CT scan, lumbar spine 02/15/2024    Severe protein-calorie malnutrition (HCC) 02/14/2024    Debility 02/13/2024    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    Dysphagia 11/21/2023    Macular degeneration, age related 02/27/2023    Traumatic subdural hemorrhage with loss of consciousness of unspecified duration, initial encounter ([facility]) 02/27/2023    Sensorineural hearing loss (SNHL), bilateral 08/18/2022    Balance disorder 06/28/2022    Abnormal echocardiogram 06/27/2022    Right bundle-branch block 06/27/2022    Moderate protein-calorie malnutrition (HCC) 06/19/2022    Low BP 06/19/2022    Diastolic dysfunction 06/19/2022    EKG abnormalities 06/19/2022    Constipation due to neurogenic bowel 06/02/2022    Urinary retention 06/02/2022    SDH (subdural hematoma) (HCC) 05/27/2022    Primary osteoarthritis of left knee 05/17/2022    Hygroma 04/26/2022    Right hand pain     Carpal tunnel syndrome on right     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[facility]) 09/02/2021    Overweight (BMI 25.0-29.9) 01/27/2021    Negative depression screening 09/26/2019    Hypercholesterolemia 07/12/2018    Borderline hypertension 07/12/2018    Hearing loss 04/08/2009          Alexandrea Lugo MD    I have spent a total time of 55 minutes on 03/13/24 in caring for this patient including Impressions, Counseling / Coordination of care, Documenting in the medical record, and weekly team conference discussion .      ** Please Note:  voice to text software may have been used in the creation of this document. Although proof errors in transcription or interpretation are a potential of such software**

## 2024-03-13 NOTE — NURSING NOTE
Patient is awake and alert. Skin warm and dry. Respirations easy on room air. Finley cath patent and draining clear abler urine.  Thursh somewhat improved with Mycelix usage but continues with patchy areas of coating on tongue.  Continues with expressive aphasia but able to make needs known with short responses, nodding head and using gestures. Wife supportive at bedside. Call bell maintained in reach

## 2024-03-13 NOTE — PROGRESS NOTES
03/13/24 1000   Pain Assessment   Pain Assessment Tool 0-10   Pain Score No Pain   Pain Rating: FLACC (Rest) - Face 0   Pain Rating: FLACC (Rest) - Legs 0   Pain Rating: FLACC (Rest) - Activity 0   Pain Rating: FLACC (Rest) - Cry 0   Pain Rating: FLACC (Rest) - Consolability 0   Score: FLACC (Rest) 0   Restrictions/Precautions   Precautions Aphasia;Aspiration;Bed/chair alarms;Cognitive;Fall Risk;Finley;Hard of hearing;Supervision on toilet/commode   Comprehension   Assist Devices Hearing Aid   Comprehension (FIM) 3 - Understands basic info/conversation 50-74% of time   Expression   Expression (FIM) 3 - Expresses basic info/needs 50-74% of time   Social Interaction   Social Interaction (FIM) 4 - Interacts 75-89% of time   Problem Solving   Problem solving (FIM) 2 - Solves basic problems 25-49% of time   Memory   Memory (FIM) 2 - Recognizes, recalls/performs 25-49%   Memory Skills   Orientation Level Oriented to person;Oriented to place;Oriented to time   Reading Comprehension   Reading Status   (reading short phrases on the white board with 90% comprehension.)   Swallow Assessment   Swallow Treatment Assessment received OOB upright in wheelchair. Effortful swallow x15 with water via 5CC provalve cup cough 80% of the time ( increased s.s of aspiration this date)- also experienced minimal anterior loss x2 trials. Nectar thick liquids via 10CC provalve cup x5 no clinical overt s.s of aspiration. Tongue push up with visual/written/mirror feedback completed x10 trials. Attempted EMST trial; however, unable to follow direction to complete.   SLP Therapy Minutes   SLP Time In 1000   SLP Time Out 1030   SLP Total Time (minutes) 30   SLP Mode of treatment - Individual (minutes) 30   SLP Mode of treatment - Concurrent (minutes) 0   SLP Mode of treatment - Group (minutes) 0   SLP Mode of treatment - Co-treat (minutes) 0   SLP Mode of Treatment - Total time(minutes) 30 minutes   SLP Cumulative Minutes 855

## 2024-03-13 NOTE — NUTRITION
03/13/24 1540   Recommendations/Interventions   Summary Calorie count prescribed on 3/12. Calorie count prescribed after patient already consumed breakfast therefore no data for this meal. Calorie count completed for lunch and dinner on 3/12. Patient is prescribed aDysphagia 1, NTL extra sauce/gravy. B: oatmeal, yogurt, applesauce, pudding. L/D: yogurt, applesauce, pudding. Mightyshake TID. Magic cup BID. Estimated intake for 3/12 lunch- 1216kcal, 34g pro and 207mL. Estimated intake for 3/12 dinner-960kcal, 51g pro and 148mL. Will continue to monitor calorie count.   Interventions/Recommendations Continue current diet order;Calorie Count;Supplement continue   Nutrition Complexity Risk   Nutrition complexity level Moderate risk   Nutrition review: 03/14/24   Follow up date 03/18/24

## 2024-03-13 NOTE — PROGRESS NOTES
03/13/24 1200   Pain Assessment   Pain Assessment Tool 0-10   Pain Score No Pain   Restrictions/Precautions   Precautions Aspiration;Bed/chair alarms;Cognitive;Fall Risk;Finley;Hard of hearing   Weight Bearing Restrictions No   ROM Restrictions No   Eating   Type of Assistance Needed Supervision   Physical Assistance Level No physical assistance   Comment pt's wife present during lunch meal and provided direct supervision and redirection as needed for pt to continue attending to tray, encouraged pt to eat all foods and self-feed which pt did during completion of tray; pt's wife set up tray including condiments, utensils, and napkins; occasional assist from pt's wife to hand pt small carton of mighty shake drink as pt with difficulty gripping carton   Eating CARE Score 4   Eating Assessment   Food To Mouth Yes   Noted Coughing  (after <25% of swallows)   Positioning Upright;Out of Bed   Safety Needs Increase Time;Cues;Freq Swallow   Meal Assessed Lunch   Intake Mode PO   Finishes Timely No   Opens Packages No   Sit to Lying   Type of Assistance Needed Supervision   Physical Assistance Level No physical assistance   Sit to Lying CARE Score 4   Sit to Stand   Type of Assistance Needed Incidental touching   Comment    Sit to Stand CARE Score 4   Bed-Chair Transfer   Type of Assistance Needed Incidental touching   Comment    Chair/Bed-to-Chair Transfer CARE Score 4   Transfer Bed/Chair/Wheelchair   Limitations Noted In Balance;Endurance;Problem Solving;Sequencing;LE Strength   Adaptive Equipment Roller Walker   Exercise Tools   Theraputty searched for and retrieved small objects in red theraputty with assist from OT to isolate areas of putty with objects   Other Exercise Tool 1 UE strengthening in bilat UE using 1.5# therabar, 3x10: elbow flexion/extension, protraction/retraction, internal/external rotation, shoulder flexion/extension; visual and physical cues required to complete exercises correctly and for safety  purposes   Other Exercise Tool 2 popsicle stick match with pt following prompts on cards to place sticks in slots with R hand, consistent visual cues to count/maintain attention, however pt able to count small numbers of sticks without cues   Cognition   Overall Cognitive Status Impaired   Arousal/Participation Alert;Cooperative   Attention Attends with cues to redirect   Orientation Level Oriented to person;Oriented to place   Memory Decreased short term memory;Decreased recall of recent events;Decreased recall of precautions   Following Commands Follows one step commands with increased time or repetition   Assessment   Treatment Assessment Pt agreeable to OT session this PM. Received sitting in recliner. Pt's wife present during session and participated in lunch tray completion with pt; details in respective sections. Pt's wife very confident in meal administration and supervision with pt and demonstrated good safety as well; pt responded well to wife's participation. Pt completed ROM/strength, activity tolerance, and fine motor/cognitive activities after lunch tray with consistent visual and physical cues to remain attending to tasks. Pt more verbal and attentive overall this session and responded to words written on whiteboard by OT. Returned to supine at end of session. Pt's barriers to d/c include decreased strength throughout, decreased balance, impaired hearing, impaired cognition including safety awareness, problem solving, attention, comprehension, and expression, and decreased activity tolerance; all affect independence in self care and fxl transfers. Pt would benefit from continued skilled OT services in order to address listed barriers and prepare for safe d/c.   Prognosis Fair   Problem List Decreased strength;Decreased endurance;Impaired balance;Decreased cognition;Impaired judgement;Decreased safety awareness;Impaired hearing;Decreased range of motion;Decreased coordination   Plan    Treatment/Interventions ADL retraining;Functional transfer training;LE strengthening/ROM;Therapeutic exercise;Endurance training;Cognitive reorientation;Patient/family training;Equipment eval/education;Compensatory technique education;OT   Progress Progressing toward goals   OT Therapy Minutes   OT Time In 1200   OT Time Out 1330   OT Total Time (minutes) 90   OT Mode of treatment - Individual (minutes) 90

## 2024-03-13 NOTE — TEAM CONFERENCE
Acute RehabilitationTeam Conference Note  Date: 3/13/2024   Time: 10:58 AM       Patient Name:  Thomas Chase       Medical Record Number: [id number]   YOB: 1940  Sex: Male          Room/Bed:  [facility] 213/[facility] 213-02  Payor Info:  Payor: MEDICARE / Plan: MEDICARE A AND B / Product Type: Medicare A & B Fee for Service /      Admitting Diagnosis: Cl fracture base skull wth intracran hemorrhage, no loss consciousness (HCC) [S02.109A, S06.300A]   Admit Date/Time:  2/20/2024  3:13 PM  Admission Comments: No comment available     Primary Diagnosis:  Intracranial hemorrhage (HCC)  Principal Problem: Intracranial hemorrhage (HCC)    Patient Active Problem List    Diagnosis Date Noted    Hematuria 03/04/2024    Headache 02/20/2024    Behavior safety risk 02/20/2024    Bilateral lower extremity weakness 02/17/2024    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    Abnormal CT scan, lumbar spine 02/15/2024    Severe protein-calorie malnutrition (HCC) 02/14/2024    Debility 02/13/2024    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) 02/27/2023    Sensorineural hearing loss (SNHL), bilateral 08/18/2022    Balance disorder 06/28/2022    Abnormal echocardiogram 06/27/2022    Right bundle-branch block 06/27/2022    Moderate protein-calorie malnutrition (HCC) 06/19/2022    Low BP 06/19/2022    Diastolic dysfunction 06/19/2022    EKG abnormalities 06/19/2022    Constipation due to neurogenic bowel 06/02/2022    Urinary retention 06/02/2022    SDH (subdural hematoma) ([facility]) 05/27/2022    Primary osteoarthritis of left knee 05/17/2022    Hygroma 04/26/2022    Right hand pain     Carpal tunnel syndrome on right     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[facility]) 09/02/2021    Overweight (BMI 25.0-29.9) 01/27/2021    Negative depression screening 09/26/2019     Hypercholesterolemia 07/12/2018    Borderline hypertension 07/12/2018    Hearing loss 04/08/2009       Physical Therapy:    Weight Bearing Status: Weight Bearing as Tolerated  Transfers: Incidental Touching, Supervision (increased visual/tactile cues)  Bed Mobility: Supervision (bed rails; increased visual/tactile cues)  Amulation Distance (ft): 190 feet  Ambulation: Incidental Touching (increased visual/tactile cues for sequence/technique/direction)  Assistive Device for Ambulation: Roller Walker  Wheelchair Mobility Distance: 133 ft  Wheelchair Mobility: Maximum Assistance (max visual and tactile cues)  Number of Stairs: 14  Assistive Device for Stairs: Bilateral Hand Rails  Stair Assistance: Incidental Touching (increased visual/tactile cues for sequence/technique)  Ramp: Incidental Touching (increased visual/tactile cues for sequence/technique)  Assistive Device for Ramp: Roller Walker  Discharge Recommendations: Home with:  DC Home with:: 24 Hour Assisteance, Family Support, First Floor Setup, Home Physical Therapy (vs alternate placement)     02.21/2024:   Patient seen today for IE.  Presents, following hospitalizaton  for  traumatic brain dysfunction (L SDH, R SAH, L anterior hypodensity lateral temporal lobe) and PMH significant for previous fall with TBI (SDH with L craniotomy), chronic aphasia, hard of hearing with cochlear implants in place, and hyperlipidemia.    Patient MIN A bed mobility, MOD A transfers with walker, MAX A wheelchair mobility level and unlevel surfaces up to 133' with increased visual and tactile cues, min-mod A ambulation up to 66' level and unlevel surfaces with walker and wheelchair follow.  Patient limited by decreased ROM/strength, decreased balance and safety, decreased endurance, impaired  cognition, and impaired hearing (patient wears cochlear implants).   May benefit from continued inpatient [facility] PT to increase function, safety, and increased independence in prep for safe d/c  to home with family and continued PT services as needed.    02/28/2024:   Patient participating in therapy and making positive gains.  Patient CG/S bed mobility, CG transfers with walker, CG ambulation up to 167' level and unlevel surfaces with walker, cg negotiation of 7 steps with 2 handrails.   Patient requires increased visual and tactile cues for general safety, Finley management, and task sequence.  Patient also remains limited by decreased ROM/strength, decreased balance and safety, decreased endurance, impaired cognition, aphasia, and impaired hearing.  Patient may benefit from continued inpatient [facility] PT to increase function, safety, and increased independence in prep for safe d/c to home with family and continued PT services.    03/06/2024:   Patient participating in therapy and continuing to make positive gains.   Patient CG/S bed mobility, CG/close S transfers with walker, CG/close S ambulation up to 180' level and unlevel surfaces with walker, MIN A ambulation outside on uneven pavement/sidewalks, CG/close S negotiation of steps with 2 handrails, CG ramp with walker.  Patient remains limited by decreased ROM/strength, decreased balance and safety, decreased endurance, impaired cognition, aphasia, and impaired hearing.   Patient may benefit from continued inpatient [facility] PT to increase function, safety, and increased independence in prep for safe d/c to home with family and continued PT services as needed.    03/13/2024:   Patient participating in therapy.   Patient S bed mobility with bed rails, CG/close S transfers with walker, cg ambulation up to 190' level and unlevel surfaces with walker, CG ramp with walker, cg 14 steps with 2 handrails.   Remains limited by decreased ROM/strength, decreased balance and safety, decreased endurance, impaired cognition, aphasia, and impaired hearing.  May benefit from continued inpatient [facility] PT to increase function, safety, and independence in prep for safe d/c to home  with family and continued PT services as needed vs alternate placement.    Occupational Therapy:  Eating: Supervision  Grooming: Supervision  Bathing: Minimal Assistance  Bathing: Minimal Assistance  Upper Body Dressing: Minimal Assistance  Lower Body Dressing: Maximum Assistance  Toileting: Maximum Assistance  Tub/Shower Transfer: Minimal Assistance (during dry transfer practice)  Toilet Transfer: Incidental Touching  Cognition: Exceptions to WNL  Cognition: Decreased Memory, Decreased Executive Functions, Decreased Attention, Decreased Comprehension  Orientation: Person, Place  Discharge Recommendations: Home with:  DC Home with:: 24 Hour Supervision, Family Support, First Floor Setup, Home Occupational Therapy       2/21/24: Pt new admit to ARU. Current LOF listed above. Barriers to d/c include decreased strength throughout, decreased balance, impaired cognition including safety awareness, problem solving, attention, comprehension, expression, and short term memory, and decreased activity tolerance; all affect independence in self care and fxl transfers. Pt will participate in ADL training, therapeutic exercises and activities, fxl mobility/transfers, cognitive training, and activity tolerance in order to progress towards goals. Pt would benefit from continued skilled OT services in order to address listed barriers and prepare for safe d/c.     2/28/24: Pt's current LOF listed above. Continues with barriers to d/c of decreased strength throughout, decreased balance, impaired cognition including safety awareness, problem solving, attention, comprehension, expression, and short term memory, and decreased activity tolerance; all affect independence in self care and fxl transfers. Pt participating in ADL training, therapeutic exercises and activities, fxl mobility/transfers, cognitive training, and activity tolerance in order to progress towards goals. Pt would benefit from continued skilled OT services in order to  address listed barriers and prepare for safe d/c.      3/6/24: Pt's current LOF listed above. Continues with barriers to d/c of decreased strength throughout, decreased balance, impaired cognition including safety awareness, problem solving, attention, comprehension, expression, and short term memory, and decreased activity tolerance; all affect independence in self care and fxl transfers. Pt participating in ADL training, therapeutic exercises and activities, fxl mobility/transfers, cognitive training, and activity tolerance in order to progress towards goals. Pt would benefit from continued skilled OT services in order to address listed barriers and prepare for safe d/c.     3/13/24: Pt's current LOF listed above. Continues with barriers to d/c of decreased strength throughout, decreased balance, impaired cognition including safety awareness, problem solving, attention, comprehension, expression, and short term memory, and decreased activity tolerance; all affect independence in self care and fxl transfers. Pt participating in ADL training, therapeutic exercises and activities, fxl mobility/transfers, cognitive training, and activity tolerance in order to progress towards goals. Pt would benefit from continued skilled OT services in order to address listed barriers and prepare for safe d/c.     Speech Therapy:  Mode of Communication: Non-verbal  Speech/Language: Expressive Aphasia  Cognition: Exceptions to WNL  Cognition: Decreased Memory, Decreased Executive Functions, Decreased Attention, Decreased Comprehension, Decreased Safety  Orientation: Person, Place, Time  Swallowing: Exceptions to WNL  Swallowing: Oral Dysphagia, Pharyngeal Dysphagia, Aspiration Risk  Diet Recommendations: Level 1/Puree, [place] Thick  Discharge Recommendations:  (home with 24/7 supervision vs. SNF)  DC Home with::  (continued speech therapy services recommended)  2/21 per nursing patient coughing with thin liquids. Diet downgraded to  nectar thick liquids, consult pending this date.     2/28     Cognitive Linguisitic Assessments   Cognitive Linquistic Quick Test (CLQT) BCAT 8/21- pt only able to participate via white board 2' hearing loss     Comprehension   Assist Devices Other  (cochlear implants)   Auditory    (white board for communication)   QI: Comprehension 2. Sometimes Understands: Understands only basic conversations or simple, direct phrases. Frequently requires cues to understand   Comprehension (FIM) 2 - Understands only simple expressions or gestures (waves, hello)   Expression   Non-Verbal Basic   QI: Expression 2. Frequently exhibits difficulty with expressing needs and ideas   Expression (FIM) 2 - Uses only simple expressions or gestures (waves, hello)   Social Interaction   Social Interaction (FIM) 2 - Interacts appropriately 25-49% of time   Problem Solving   Problem solving (FIM) 2 - Solves basic problems 25-49% of time   Memory   Memory (FIM) 2 - Recognizes, recalls/performs 25-49%   Memory Skills   Orientation Level Oriented to person   Consistencies Assessed and Performance   Materials Admnistered Puree/Level 1;Nectar thick liquid   Oral Stage Mild impaired   Phargngeal Stage Moderate impaired   Swallow Mechanics Mild delayed;Swallow initation;Weak larygneal rise;Aspiration risk   Strategies and Efficacy small bites, small sips, slow rate   Recommendations   Risk for Aspiration Present   Recommendations Consider oral diet   Diet Solid Recommendation Level 1 Dysphagia/pureed   Diet Liquid Recommendation Nectar thick liquid   Recommended Form of Meds Whole;With thick liquid   General Precautions Aspiration precautions;Feed only when alert;Minimize distractions;Upright as possible for all oral intake;Remain upright for 45 mins after meals   Compensatory Swallowing Strategies Alternate solids and liquids   Results Reviewed with PT/Family/Caregiver     Eating   Type of Assistance Needed Supervision  (visual cues)   Physical  Assistance Level No physical assistance   Eating CARE Score 4       3/6 Continue puree and nectar thick liquids via 10CC cup and water via 5CC. Continue to abide by strict aspiration precautions. Plan to repeat VBS prior to discharge. Last VBS was 2/14/24.     3/13  Repeat VBS on 3/11/24 recommendations for pureed solids and nectar thick liquids. Overall concern for swallow decline from previous VBS on 2/14/24 with recommendations at that time for pureed solids and thin liquids. Sigificant pharyngeal residue with all consistencies is a concern for diet tolerance across meals with increased aspiration risk. Strict aspiration precautions, swallow strategies and direct supervision with meals continues.     UPDATED FIM LEVELS   Comprehension   Comprehension (FIM) 3 - Understands basic info/conversation 50-74% of time  (via written direction in short phrases, gesture or lip reading)   Expression   Expression (FIM) 3 - Expresses basic info/needs 50-74% of time  (wants/needs via verbal 1-2 word phrases or gesture.)   Social Interaction   Social Interaction (FIM) 4 - Interacts 75-89% of time  (increased social greetings and attention throughout session.)   Problem Solving   Problem solving (FIM) 2 - Solves basic problems 25-49% of time   Memory   Memory (FIM) 2 - Recognizes, recalls/performs 25-49%       Nursing Notes:  Appetite: Fair  Diet Type: Dysphagia I, Nectar thick liquids                      Diet Patient/Family Education Complete: Yes    Type of Wound (LDA):  (mepelix dressings to bilateral heels and sacrum for protection)                    Type of Wound Patient/Family Education: No  Bladder: Catheter  Catheter Type: Finley  Bladder Patient/Family Education: Yes  Bowel: 1 - Total Assistance     Bowel Patient/Family Education: Yes  Pain Location/Orientation: Location: Generalized  Pain Score: 2                       Hospital Pain Intervention(s): Medication (See MAR)  Pain Patient/Family Education: No  Medication  Management/Safety  Injectable: Lovenox  Safe Administration: No  Reason for non-safe administration: not attempted  Medication Patient/Family Education Complete: Yes    2/27/2024  Pt. Admit after ICH/falls. Pt. Is deaf. Communicates via white board and handouts. Does occasionally say one or two words to questions asked, answers are appropriate. Requires close supervision  when eating and requires special cups to drink with- on nectar thick fluids.  Follows commands. Pills crushed and in applesauce. Has failed urinary retention protocol and has a brooks catheter in place now. Has had small amount of bleeding at meatus and in bag. Reported to earlier shift that tip of penis was irritated. Has not reported this to myself. Mediplex to sacrum and heels as prevention.   3/4/24 Pt On nectar thick  fluids uses special cups. Supervision for all meals wife is cleared by speech to sit with PT during meals. Brooks back in place failed voiding trial.     3/12/2024 A/O x 2-3, pt. Is starting to be more verbal and clearer with words. Communication done with white board. Had VBS done on 3-11, now on Modified Dysphagia diet 1-pureed with nectar thick fluids, medications crushed and given in applesauce. Failed urinary retention trial again and now has brooks in. Developed Hypotensive episodes which improved after fluids, currently receiving NSS at 75ml/hr. Preventive allevny on buttocks and both heels. Scrotum is pink, cream applied. Meds adjusted for on-going thrush. 4 SR up for safety and bed/tab alarms on.     Case Management:     Discharge Planning  Living Arrangements: Lives w/ Spouse/significant other  Support Systems: Spouse/significant other, Son, Daughter  Assistance Needed: unknown  Type of Current Residence: Private residence  Current Home Care Services: No  Initial assessment & orientation to [facility] with Pt & his wife, who was visiting bedside. Pt had difficulty hearing this worker, but his wife expressed that she is in  agreement with tentative team recommendations. Tx team meeting was held today & tentative target DC date is 3/8, with home health services (all services). Discussed role of team members & reviewed TX & DC planning with Pt & Pt's spouse, who expressed understanding & agreement. Pt's additional family also visiting today & also present when physician assessed Pt.  SW will continue to monitor & assist as needed with TX & DC planning.     2/28/24 - Tx team recommendations reviewed with patient & family, who expressed understanding & agreement. Target DC date is 3/8 with [facility] (all); a list of providers was provided to Pt & a referral will be made based on Pt preference (expressed interest in [facility] & [facility]). Pt's spouse inquiring if there may be an extension; discussed that this will be reviewed at the team meeting next week. Provided information regarding meal options. SW will continue to monitor & assist as needed with Tx & DC planning.    Is the patient actively participating in therapies? yes  List any modifications to the treatment plan:       Barriers Interventions   Intracranial hemorrhage, [place], baseline aphasia, failed voiding trial with brooks in place, hypotension Outpt follow up with neurology, medication management/ IV fluids, pt/ family education   Dysphagia, decreased nutrition/ liquid intake with increased cough thin water even with comp strategies Compensatory strategies, pt/ caregiver education, nectar liquids, thin water provalve cup, video swallow study completed showing decline in swallow function   Decreased cognition, hearing, balance, safety, endurance, strength/ ROM Compensatory strategies, ADL training, gait/ transfer training, therex, theract, pt/ family education, neuro brandon             Is the patient making expected progress toward goals? yes  List any update or changes to goals:   Pt will be min A comprehension/ expression and mod I problem solving and memory.  Pt will increase fluid intake  with safe swallowing strategies and encouragement for improves fluid intake.  Pt will be mod I bed mobility, CGA transfers/ gait with RW and min A stairs.  Pt will be min A UB dressing/ LB dressing/ toileting and CGA bathing/ toilet transfer.   Pt will increase activity tolerance to allow for social communication and improved fluid intake.    Medical Goals: Patient will be medically stable for discharge to [facility] upon completion of rehab program    Weekly Team Goals:   Rehab Team Goals  ADL Team Goal: Patient will require supervision with ADLs with least restrictive device upon completion of rehab program  Transfer Team Goal: Patient will require assist with transfers with least restrictive device upon completion of rehab program  Locomotion Team Goal: Patient will require assist with locomotion with least restrictive device upon completion of rehab program  Cognitive Team Goal: Patient will return to premorbid level of cognitive activity upon completion of rehab program    Discussion: Pt is participating in rehab program and making gians towards stated goals. Pt is oriented x 3 demonstrating basic wants and needs. Pt is mod A comp/ expression, min A social, max A problem solving/ memory. Pt is S bed mobility, CGA/ S with transfers, CGA gait with RW CGA/ S stairs/ ramp with RW all with max visual cues. Pt is max A LB dressing, toileting, min A bathing/ UB dressing, supervision eating/ grooming . Pt will benefit from skilled placement following rehab stay for continued support with referrals sent.     Anticipated Discharge Date:  SNF placement based on bed availability, possibly Friday March 15th, 2024

## 2024-03-13 NOTE — PROGRESS NOTES
Progress Note - Thomas Chase 83 y.o. male MRN: [id number]    Unit/Bed#: [facility] 213-02 Encounter: [id number]        Subjective:   Patient seen and examined at bedside after reviewing the chart and discussing the case with the caring staff.      Patient examined at bedside.  Patient's blood pressure was normal today. Will continue to monitor. Denies any symptoms.     On review of patient's labs 3/11/2024 it was found that patient's vitamin D level was 53.6, B12 355, sed rate 58, CRP 51.6.  SPEP and FTA-ABS are still pending.  Urine culture pending.     Physical Exam   Vitals: Blood pressure 133/65, pulse 84, temperature 98.8 °F (37.1 °C), temperature source Temporal, resp. rate 17, height 6' (1.829 m), weight 59 kg (130 lb 1.1 oz), SpO2 94%.,Body mass index is 17.64 kg/m².  Constitutional: Patient in no acute distress.  HEENT: PERR, EOMI, MMM.  Cardiovascular: Normal rate and regular rhythm.    Pulmonary/Chest: Effort normal and breath sounds normal.   Abdomen: Soft, + BS, NT.  Bilateral feet:  Skin appears pink, warm, DP pulses 2+, no swelling    Assessment/Plan:  Thomas Chase is a(n) 83 y.o. year old male with left SDH and right SAH.     1.  Cardiac with hx of HTN, HLD/hypotension.  On pravastatin 40 mg daily (simvastatin nonformulary).  Continue to monitor BP.  I will discontinue IV fluids for now 3/12/2024.  Patient's orthostatic vitals did not show significant drop. Consider giving normal saline bolus 250 cc/hr if patient's blood pressure continues to stay low with symptoms.  I will get UA to rule out UTI as patient's CRP and sed rate were slightly elevated.  Patient's CBC and CMP are within normal range. Start midodrine 2.5 mg twice daily 3/12/2024.  2.  Allergic rhinitis.  Patient is on loratadine 10 mg daily.  3.  Depression/anxiety.  Patient is on Zoloft 25 mg daily.  4.  Insomnia.  Patient is on melatonin 6 mg at bedtime.  5.  Thrush.  I will discontinue nystatin liquid swish and swallow 3/11/2024.  I  will put the patient on clotrimazole lozenges 3/11/2024.  If patient is unable to do that may be I will restart nystatin liquid swish and swallow.  6.  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  Patient is mainly nonverbal.  7.  Urinary retention.  Urinary retention protocol.  I will reduce Flomax 0.4 mg daily 3/12/2024 due to the fact it may cause hypotension.  Urology consulted.  Finley catheter placed 2/22/24, removed on 2/28/24, placed again 3/1/24, removed again 3/7/24.    8.  Dysphagia.  Speech therapy following.  Dysphagia 1 puureed with nectar thick liquid.  Video swallow study done on 3/11/2024.  9.  Vitamin B12 deficiency.  Patient has been put on vitamin B12 500 mcg daily.  I am getting vitamin B12 levels for the patient.  10.  Pain and bowel management.  As per physiatrist.  11.  Intracranial hemorrhage.   Patient will be receiving intensive PT OT ST as per physiatrist.    Anticipated discharge date:  Thursday, 3/14/24  PCP:  RONY Diaz

## 2024-03-13 NOTE — TELEPHONE ENCOUNTER
Called Allegra, patient's daughter. She is listed on the communication consent form. Advised her of the appointment on 3/21/24. She agreed to keep the appointment as virtual since discharge plan is pending. She believes his [facility] stay was just extended and reports he will probably be discharged to [facility] in [place]. Advised how this RN will send a message to Dr. May to confirm he is okay with the appointment being virtual. She agreed to call her phone number for the virtual appointment. Advised we can always reschedule the appointment if the plan needs to be changed due to discharge plans. She was appreciative.    Staff message was sent to Dr. May.

## 2024-03-13 NOTE — CASE MANAGEMENT
CM met with patient's wife, patient sleeping, CM relayed information from today's tem meeting, with referrals inplace for subacute snf for [facility]. CM called [facility] [phone], spoke with , who stated they did not received referral from yesterday. CM resent referral to [facility] and [facility], awaiting responses.

## 2024-03-13 NOTE — NURSING NOTE
Patient resting in bed at this time.  No signs of distress noted.  Bed alarm in place to promote patient safety.  Patient with brooks catheter draining yellow urine.  Allevyn reapplied to the sacrum at this time.  Heel allevyn dressings CDI.  Heels elevated off of bed on a pillow overnight.  Call bell within reach.  Plan of care ongoing.

## 2024-03-13 NOTE — PROGRESS NOTES
03/13/24 0854   Pain Assessment   Pain Assessment Tool 0-10   Pain Score No Pain   Restrictions/Precautions   Precautions Aphasia;Aspiration;Bed/chair alarms;Cognitive;Fall Risk;Finley;Hard of hearing;Impulsive;Supervision on toilet/commode   Cognition   Overall Cognitive Status Impaired   Arousal/Participation Alert;Responsive;Cooperative   Attention Attends with cues to redirect   Orientation Level Oriented to person;Oriented to place   Memory Decreased short term memory;Decreased recall of recent events;Decreased recall of precautions   Following Commands Follows one step commands with increased time or repetition   Sit to Stand   Type of Assistance Needed Incidental touching;Supervision   Comment cg/close S with RW; increased cues for sequence/technique/hand placement   Sit to Stand CARE Score 4   Bed-Chair Transfer   Type of Assistance Needed Incidental touching;Supervision   Comment cg/close S with RW; increased cues for sequence/technique/hand placement   Chair/Bed-to-Chair Transfer CARE Score 4   Walk 10 Feet   Type of Assistance Needed Incidental touching;Supervision   Comment cg/close S level and unlevel surfaces with RW; increased visual/tactile cues for sequence/technique/direction   Walk 10 Feet CARE Score 4   Walk 50 Feet with Two Turns   Type of Assistance Needed Incidental touching;Supervision   Comment cg/close S level and unlevel surfaces with RW; increased visual/tactile cues for sequence/technique/direction   Walk 50 Feet with Two Turns CARE Score 4   Walk 150 Feet   Type of Assistance Needed Incidental touching;Supervision   Comment cg/close S level and unlevel surfaces with RW; increased visual/tactile cues for sequence/technique/direction   Walk 150 Feet CARE Score 4   Walking 10 Feet on Uneven Surfaces   Type of Assistance Needed Incidental touching;Supervision   Comment cg/close S level and unlevel surfaces with RW; increased visual/tactile cues for sequence/technique/direction   Walking  10 Feet on Uneven Surfaces CARE Score 4   Ambulation   Primary Mode of Locomotion Prior to Admission Walk   Distance Walked (feet) 222 ft  (222' 137')   Assist Device Roller Walker   Gait Pattern Inconsistant Maria Victoria;Slow Maria Victoria;Decreased foot clearance;Forward Flexion;Narrow ILIANA   Limitations Noted In Balance;Device Management;Endurance;Posture;Safety;Speed;Strength   Provided Assistance with: Balance;Direction   Walk Assist Level Contact Guard;Close Supervision   Findings cg/close S level and unlevel surfaces with RW; increased visual/tactile cues for sequence/technique/direction   Does the patient walk? 2. Yes   Curb or Single Stair   Style negotiated Single stair   Type of Assistance Needed Supervision   Comment close S using 2 hand rails; cues for sequence/technique   1 Step (Curb) CARE Score 4   4 Steps   Type of Assistance Needed Supervision   Comment close S using 2 hand rails; cues for sequence/technique   4 Steps CARE Score 4   12 Steps   Type of Assistance Needed Supervision   Comment close S using 2 hand rails; cues for sequence/technique   12 Steps CARE Score 4   Stairs   Type Stairs   # of Steps 14   Weight Bearing Precautions WBAT;Fall Risk   Assist Devices Bilateral Rail;Roller Walker   Findings close S using 2 hand rails; cues for sequence/technique; cg ramp with RW   Therapeutic Interventions   Strengthening seated ther ex   Balance gait and transfer training   Other ramp and stair negotiation   Assessment   Treatment Assessment Patient agreeable to therapy session.  No pain reported.   IV disconnected.     Continues to require increased visual/tactile cues for sequence, technique, and direction for all tasks.   Unable to manage Finley.    Completed ther ex for general LE strengthening; gait and transfer training focusing on sequence and technique for improved balance and safety with functional mobility using walker.  Negotiated ramp cg with rw; steps with 2 handrails and close S.   Patient  appropriate for concurrent therapy to increase motivation and encouragement among pts with similar deficits while completing therapy session.   PT Barriers   Physical Impairment Decreased strength;Decreased range of motion;Decreased endurance;Impaired balance;Decreased mobility;Decreased coordination;Decreased cognition;Impaired judgement;Decreased safety awareness;Impaired hearing   Functional Limitation Wheelchair management;Walking;Transfers;Standing;Stair negotiation;Ramp negotiation;Car transfers   Plan   Treatment/Interventions Functional transfer training;LE strengthening/ROM;Elevations;Therapeutic exercise;Gait training   Progress Progressing toward goals   PT Therapy Minutes   PT Time In 0854   PT Time Out 1000   PT Total Time (minutes) 66   PT Mode of treatment - Individual (minutes) 36   PT Mode of treatment - Concurrent (minutes) 30   PT Mode of treatment - Group (minutes) 0   PT Mode of treatment - Co-treat (minutes) 0   PT Mode of Treatment - Total time(minutes) 66 minutes   PT Cumulative Minutes 1552   Therapy Time missed   Time missed? No

## 2024-03-14 PROCEDURE — 97530 THERAPEUTIC ACTIVITIES: CPT

## 2024-03-14 PROCEDURE — 99233 SBSQ HOSP IP/OBS HIGH 50: CPT | Performed by: PHYSICAL MEDICINE & REHABILITATION

## 2024-03-14 PROCEDURE — 92526 ORAL FUNCTION THERAPY: CPT

## 2024-03-14 PROCEDURE — 97535 SELF CARE MNGMENT TRAINING: CPT

## 2024-03-14 PROCEDURE — 97110 THERAPEUTIC EXERCISES: CPT

## 2024-03-14 PROCEDURE — 97116 GAIT TRAINING THERAPY: CPT

## 2024-03-14 PROCEDURE — 97537 COMMUNITY/WORK REINTEGRATION: CPT

## 2024-03-14 PROCEDURE — G0316 PR PROLONG INPT EVAL ADD15 M: HCPCS | Performed by: PHYSICAL MEDICINE & REHABILITATION

## 2024-03-14 RX ADMIN — Medication 1 TABLET: at 08:01

## 2024-03-14 RX ADMIN — TAMSULOSIN HYDROCHLORIDE 0.4 MG: 0.4 CAPSULE ORAL at 17:41

## 2024-03-14 RX ADMIN — CLOTRIMAZOLE 10 MG: 10 LOZENGE ORAL; TOPICAL at 08:01

## 2024-03-14 RX ADMIN — Medication 6 MG: at 21:02

## 2024-03-14 RX ADMIN — CLOTRIMAZOLE 10 MG: 10 LOZENGE ORAL; TOPICAL at 13:57

## 2024-03-14 RX ADMIN — ENOXAPARIN SODIUM 40 MG: 40 INJECTION SUBCUTANEOUS at 08:01

## 2024-03-14 RX ADMIN — PRAVASTATIN SODIUM 40 MG: 40 TABLET ORAL at 17:41

## 2024-03-14 RX ADMIN — CLOTRIMAZOLE 10 MG: 10 LOZENGE ORAL; TOPICAL at 11:30

## 2024-03-14 RX ADMIN — CLOTRIMAZOLE 10 MG: 10 LOZENGE ORAL; TOPICAL at 17:41

## 2024-03-14 RX ADMIN — CYANOCOBALAMIN TAB 500 MCG 500 MCG: 500 TAB at 08:01

## 2024-03-14 RX ADMIN — ACETAMINOPHEN 650 MG: 325 TABLET ORAL at 20:38

## 2024-03-14 RX ADMIN — MIDODRINE HYDROCHLORIDE 2.5 MG: 2.5 TABLET ORAL at 17:41

## 2024-03-14 RX ADMIN — LORATADINE 10 MG: 10 TABLET ORAL at 08:01

## 2024-03-14 RX ADMIN — CLOTRIMAZOLE 10 MG: 10 LOZENGE ORAL; TOPICAL at 21:02

## 2024-03-14 RX ADMIN — SENNOSIDES 8.6 MG: 8.6 TABLET, FILM COATED ORAL at 21:02

## 2024-03-14 RX ADMIN — MIDODRINE HYDROCHLORIDE 2.5 MG: 2.5 TABLET ORAL at 08:01

## 2024-03-14 RX ADMIN — ACETAMINOPHEN 650 MG: 325 TABLET ORAL at 00:03

## 2024-03-14 RX ADMIN — SERTRALINE HYDROCHLORIDE 25 MG: 25 TABLET ORAL at 08:01

## 2024-03-14 NOTE — PROGRESS NOTES
03/14/24 0859   Pain Assessment   Pain Assessment Tool 0-10   Pain Score No Pain   Restrictions/Precautions   Precautions Aphasia;Aspiration;Bed/chair alarms;Cognitive;Fall Risk;Finley;Hard of hearing;Supervision on toilet/commode   Cognition   Overall Cognitive Status Impaired   Arousal/Participation Alert;Responsive;Cooperative   Attention Attends with cues to redirect   Orientation Level Oriented to person;Oriented to place   Memory Decreased short term memory;Decreased recall of recent events;Decreased recall of precautions   Following Commands Follows one step commands with increased time or repetition   Sit to Stand   Type of Assistance Needed Supervision;Verbal cues   Comment close S with RW; increased visual/tactile cues for sequence/hand placement   Sit to Stand CARE Score 4   Bed-Chair Transfer   Type of Assistance Needed Incidental touching   Comment cg with RW; increased visual/tactile cues for sequence/direction   Chair/Bed-to-Chair Transfer CARE Score 4   Walk 10 Feet   Type of Assistance Needed Incidental touching   Comment cg level and unlevel surfaces with RW; increased visual/tactile cues for sequence/direction   Walk 10 Feet CARE Score 4   Walk 50 Feet with Two Turns   Type of Assistance Needed Incidental touching   Comment cg level and unlevel surfaces with RW; increased visual/tactile cues for sequence/direction   Walk 50 Feet with Two Turns CARE Score 4   Walk 150 Feet   Type of Assistance Needed Incidental touching   Comment cg level and unlevel surfaces with RW; increased visual/tactile cues for sequence/direction   Walk 150 Feet CARE Score 4   Walking 10 Feet on Uneven Surfaces   Type of Assistance Needed Incidental touching   Comment cg level and unlevel surfaces with RW; increased visual/tactile cues for sequence/direction   Walking 10 Feet on Uneven Surfaces CARE Score 4   Ambulation   Primary Mode of Locomotion Prior to Admission Walk   Distance Walked (feet) 183 ft  (183' 169' 139')    Assist Device Roller Walker   Gait Pattern Inconsistant Maria Victoria;Slow Maria Victoria;Decreased foot clearance;Forward Flexion;Narrow ILIANA   Limitations Noted In Balance;Coordination;Device Management;Endurance;Heel Strike;Posture;Sensation;Sequencing;Speed;Strength   Provided Assistance with: Balance;Direction   Walk Assist Level Contact Guard   Findings cg level and unlevel surfaces with RW; increased visual/tactile cues for sequence/direction   Does the patient walk? 2. Yes   Curb or Single Stair   Style negotiated Single stair   Type of Assistance Needed Incidental touching   Comment cg with 2 handrails; increased visual/tactile cues for sequence/technique/direction   1 Step (Curb) CARE Score 4   4 Steps   Type of Assistance Needed Incidental touching   Comment cg with 2 handrails; increased visual/tactile cues for sequence/technique/direction   4 Steps CARE Score 4   12 Steps   Type of Assistance Needed Incidental touching   Comment cg with 2 handrails; increased visual/tactile cues for sequence/technique/direction   12 Steps CARE Score 4   Stairs   Type Stairs;Ramp   # of Steps 14   Weight Bearing Precautions WBAT;Fall Risk   Assist Devices Bilateral Rail;Roller Walker   Findings cg with 2 handrails; increased visual/tactile cues for sequence/technique/direction; cg ramp with RW and increased visual/tactile cues for sequence/direction   Therapeutic Interventions   Strengthening seated and supine ther ex   Balance gait and transfer training   Other ramp and stair negotiation   Assessment   Treatment Assessment Patient agreeable to therapy session.   No pain reported.     Continues to require increased visual/tactile cues for general safety as well as sequence/technique/direction for all tasks.   Encouraging fluids with patient, however, patient with increased coughing even using strategies utilized by speech therapy.  Notifed speech therapist and NSG.  Completed ther ex for general LE strengthening; gait and transfer  training focusing on sequence and technique for improved balance and safety with functional mobility using walker.  Negotiated ramp cg with RW and steps CG with 2 handrails; increased visual/tactile cues required for safe completion.   PT Barriers   Physical Impairment Decreased strength;Decreased range of motion;Decreased endurance;Impaired balance;Decreased mobility;Decreased coordination;Decreased cognition;Impaired judgement;Decreased safety awareness;Impaired hearing   Functional Limitation Wheelchair management;Walking;Transfers;Standing;Stair negotiation;Ramp negotiation;Car transfers   Plan   Treatment/Interventions Functional transfer training;LE strengthening/ROM;Elevations;Therapeutic exercise;Bed mobility;Gait training   Progress Progressing toward goals   PT Therapy Minutes   PT Time In 0859   PT Time Out 1030   PT Total Time (minutes) 91   PT Mode of treatment - Individual (minutes) 91   PT Mode of treatment - Concurrent (minutes) 0   PT Mode of treatment - Group (minutes) 0   PT Mode of treatment - Co-treat (minutes) 0   PT Mode of Treatment - Total time(minutes) 91 minutes   PT Cumulative Minutes 1643   Therapy Time missed   Time missed? No

## 2024-03-14 NOTE — NUTRITION
03/14/24 1447   Recommendations/Interventions   Summary Calorie count 3/13. Patient is prescribed a Dysphagia 1, NTL double portions extra sauce/gravy. B: oatmeal, yogurt, applesauce, pudding. L/D: yogurt, applesauce, pudding. Mightyshake TID. Magic cup BID. Estimated intake for 3/13 breakfast - 365kcal, 20g pro, and 88mL. 3/13 lunch - 890kcal, 51g pro, and 295mL. Estimated intake for 3/13 dinner - 1334kcal, 64g pro, and 266mL. Total intake 3/13 2589kcal, 135g pro, and 649mL. Nutrient needs are met. Fluid intake not accurately captured as fluids provided by staff between meals was not available to be included with calorie count. Recommend discontinue calorie count and monitor I/Os for fluid intake.   Interventions/Recommendations Continue current diet order;Supplement continue;Monitor I & O's   Intervention Comments discontinue calorie count   Nutrition Complexity Risk   Nutrition complexity level Moderate risk   Nutrition review: 03/18/24   Follow up date 03/18/24

## 2024-03-14 NOTE — PROGRESS NOTES
PM&R PROGRESS NOTE:  Thomas Chase 83 y.o. male MRN: [id number]  Unit/Bed#: -02 Encounter: [id number]        **CHANGE IN REHAB DIAGNOSIS FROM PRE-ADMISSION SCREEN  Rehab Diagnosis: Impairment of mobility, safety, Activities of Daily Living (ADLs), and cognitive/communication skills due to Brain Dysfunction:  02.22  Traumatic, Closed Injury  Etiologic Diagnosis: L SDH, R SAH, L anterior hypodensity lateral temporal lobe    HPI: Thomas Chase is a 83 y.o. male with past medical history significant for previous fall with resultant traumatic brain injury-subdural hematoma in 2022 status post left craniotomy, chronic aphasia, hard of hearing with cochlear implant in place, hyperlipidemia who presented to the [facility] on 2/13/2024 with increased weakness and mental status change for several days.  Unclear if patient had head trauma.  CT head showing a 3 mm left frontal subdural hemorrhage.  CT lumbar spine showing DJD.  Patient found to have dysphagia and was seen by speech therapy.  VFSS on 2/15/2024 cleared him for a puréed diet with thin liquids.  Patient was also started by psychiatry for depression and started on Zoloft 25 mg daily.  Patient sustained a rapid response and a unwitnessed fall with head trauma notable bump on head on 2/15/2024.  CT head showing a new small volume acute subarachnoid hemorrhage in the left frontal opercular region as well as new nondisplaced fracture of the zygomatic arch with soft tissue contusion, stable 3 mm subdural hemorrhage seen previously.  Patient transferred to \[facility]\"" for further evaluation.  Patient seen by neurology and neurosurgery.  CTA head and neck showing a left anterior hypodensity in the left anterior lateral temporal lobe with 2 punctate hyperdensities.  These represented possible nonhemorrhagic contusions or venous infarcts.  Less likely to represent ischemia or neoplasm.  Follow-up imaging recommended with CT head in 2-3 weeks.  CT  venogram showing patent dural venous sinuses.  Patient was cleared for DVT prophylaxis and started on Keppra for seizure prophylaxis.    Medical course complicated by suspected UTI based on UA and symptoms.  Patient was treated with antibiotics x 3 days.  After medical stabilization, patient found to have acute functional deficits from his baseline in mobility, self-care, speech swallow cognition therefore admitted to [facility] for acute inpatient rehabilitation.    SUBJECTIVE: Patient seen face-to-face in his room after lunch.  Wife is at the bedside.  Reports he had a continent bowel movement today initiated on his own.  Patient also started to initiate more automatic conversation. No reported issues by patient his wife or nursing.    Provided a routine update to his daughter and answered several questions in conjunction with his speech therapist today.    ASSESSMENT: Stable, progressing      PLAN:    Rehabilitation  Functional deficits: Aphasia, dysphagia, impaired cognition, impaired balance, impulsivity, poor safety awareness impaired mobility, self care    Continue current rehabilitation plan of care to maximize function.    Expected Discharge: TBD probable SNF referrals to be made as wife interested in this option.  Aim for Friday 3/15/24    Current Function:  Physical Therapy Occupational Therapy Speech Therapy   Weight Bearing Status: Weight Bearing as Tolerated  Transfers: Incidental Touching, Supervision (increased visual/tactile cues)  Bed Mobility: Supervision (bed rails; increased visual/tactile cues)  Amulation Distance (ft): 190 feet  Ambulation: Incidental Touching (increased visual/tactile cues for sequence/technique/direction)  Assistive Device for Ambulation: Roller Walker  Wheelchair Mobility Distance: 133 ft  Wheelchair Mobility: Maximum Assistance (max visual and tactile cues)  Number of Stairs: 14  Assistive Device for Stairs: Bilateral Hand Rails  Stair Assistance: Incidental Touching  (increased visual/tactile cues for sequence/technique)  Ramp: Incidental Touching (increased visual/tactile cues for sequence/technique)  Assistive Device for Ramp: Roller Walker  Discharge Recommendations: Home with:  DC Home with:: 24 Hour Assisteance, Family Support, First Floor Setup, Home Physical Therapy (vs alternate placement)   Eating: Supervision  Grooming: Supervision  Bathing: Minimal Assistance  Bathing: Minimal Assistance  Upper Body Dressing: Minimal Assistance  Lower Body Dressing: Maximum Assistance  Toileting: Maximum Assistance  Tub/Shower Transfer: Minimal Assistance (during dry transfer practice)  Toilet Transfer: Incidental Touching  Cognition: Exceptions to WNL  Cognition: Decreased Memory, Decreased Executive Functions, Decreased Attention, Decreased Comprehension  Orientation: Person, Place   Mode of Communication: Non-verbal  Speech/Language: Expressive Aphasia  Cognition: Exceptions to WNL  Cognition: Decreased Memory, Decreased Executive Functions, Decreased Attention, Decreased Comprehension, Decreased Safety  Orientation: Person, Place, Time  Swallowing: Exceptions to WNL  Swallowing: Oral Dysphagia, Pharyngeal Dysphagia, Aspiration Risk  Diet Recommendations: Level 1/Puree, [place] Thick  Discharge Recommendations:  (home with 24/7 supervision vs. SNF)  DC Home with::  (continued speech therapy services recommended)       DVT prophylaxis  Continue Lovenox      * Intracranial hemorrhage (HCC)  Assessment & Plan  Initial presentation on 2/13/2024 with increased weakness in the legs, mental status changes for several days  CT head showing a 3 mm left frontal subdural hemorrhage  Rapid response 2/15/2024 with fall and head trauma  CT head showing a new small volume acute subarachnoid hemorrhage in the left frontal opercular region and new nondisplaced fracture of the zygomatic arch with soft tissue contusion.    Transferred to [facility]  CTA head and neck and and repeat CT head showing a left  anterior hypodensity in the left anterior lateral temporal lobe with 2 punctate hyperdensities.  Differential diagnosis nonhemorrhagic contusions or venous infarcts.  Patient seen by neurology and neurosurgery  Conservative management  Cleared for DVT prophylaxis  Started on Keppra for seizure prophylaxis  Repeat CT head scheduled on Monday, 3/4/2024 -personally reviewed  Radiology read as follows:  Improving left anterior temporal lobe contusions with resolving edema. Stable subacute subdural hemorrhage along the anterior left frontal convexity. Resolved subarachnoid and intraventricular hemorrhages. Unchanged minimal hyperdensity beneath the left craniotomy flap. Stable right parafalcine subdural hygroma. There is no new intra or extra-axial hemorrhage.  Follow-up with neurosurgery on 3/6/2024 virtually completed.  Recommendations as follows: Neurosurgery will sign off, patient will follow-up in 2 weeks with repeat CT head   Follow-up with neurosurgery and neurology in outpatient clinic.  Neurology virtual visit for next week    Behavior safety risk  Assessment & Plan  Patient demonstrating mild impulsivity in acute care  Fall x 2 in acute care  Falls at home in the past    Safety behavior plan going well and ARC.  No further impulsivity  Nursing and staff to provide close supervision near nursing station  Patient placed on 4 side rails (not as a restraint, patient and wife preference)  Monitor sleep-wake cycle  Avoid overstimulation: 1 visitor at a time, low lights, low sounds      Hematuria  Assessment & Plan  Started 3/2 - 3/3/2024  Minor, and more likely related to irritation from Finley catheter  Irrigation as needed per urology  Asymptomatic  H&H stable    Headache  Assessment & Plan  Resolved  Continue Tylenol 650 mg PRN     Severe protein-calorie malnutrition (HCC)  Assessment & Plan  BMI 17.64  Nutrition following  Optimize oral intake  Supplements ordered  Calorie count ordered - he is eating % of  meals  Hydration is variable  Maintain IV  IVF PRN    Dysphagia  Assessment & Plan  VFSS completed 2/15/2024  Cleared for a puréed diet with nectar thick liquids  SLP to follow while at [facility]  Repeat VFSS 3/11/24: Mild oral dysphagia, Moderate oral-pharyngeal dysphagia  Recommendations:  Diet: Dysphagia 1 pureed   Liquids: Nectar   Patient doing very well with oral food eating % of all of his meals.  Also gaining weight.  At times has difficulty with fluid intake due to nectar thick liquids and dysphagia.  Repeat swallow study recommended in 3-4 weeks  Supplement with IV fluids if needed  SLP also working towards a free water nectar thick protocol with oral care prior      Low BP  Assessment & Plan  Resolved  Continue midodrine 2.5 mg BID (3/12/24)    BP lower since 3/10/2024  Compression stockings and abdominal binder ordered  Internal medicine ordering IV fluid bolus.  I continued IVF x 1 L which was completed  BP continues to be very low especially in standing.   Labs reviewed - nothing to explain low BP        Urinary retention  Assessment & Plan  Finley catheter removed on [facility]  Trial of void underway  Mixed pattern  Unfortunately required multiple straight catheterizations  Finley catheter replaced 3/1/24 per urology  Flomax increased to 0.8 mg every afternoon per urology  Trial of void 3/7/2024 - failed this  Finley re-inserted 3/8/24  Patient likely to require longer time prior to removal of Finley catheter -recommend removal in 2 weeks around 3/22/2024  UA unequivocal.  Culture pending      Constipation due to neurogenic bowel  Assessment & Plan  Patient started on more aggressive bowel program yesterday  BM 3/14/24    Hearing loss  Assessment & Plan  Chronic hearing loss  Patient status post cochlear implant  MRI contraindicated    Hypercholesterolemia  Assessment & Plan  Continue simvastatin 20 mg daily (home dose)    UTI (urinary tract infection)-resolved as of 2/21/2024  Assessment & Plan  Suspected UTI  based on UA and symptoms in acute care  Completed 3 days IV antibiotics        Appreciate IM consultants medical co-management.  Labs, medications, and imaging personally reviewed.      ROS:  A ten point review of systems was completed on 03/14/24 and pertinent positives are listed in subjective section. All other systems reviewed were negative.       OBJECTIVE:   /67 (BP Location: Left arm)   Pulse 81   Temp 97.9 °F (36.6 °C) (Temporal)   Resp 18   Ht 6' (1.829 m)   Wt 59 kg (130 lb 1.1 oz)   SpO2 98%   BMI 17.64 kg/m²     Physical Exam  Vitals and nursing note reviewed.   Constitutional:       General: He is not in acute distress.  HENT:      Head: Normocephalic and atraumatic.      Nose: Nose normal.      Mouth/Throat:      Mouth: Mucous membranes are moist.   Eyes:      Conjunctiva/sclera: Conjunctivae normal.   Cardiovascular:      Rate and Rhythm: Normal rate and regular rhythm.      Pulses: Normal pulses.   Pulmonary:      Effort: Pulmonary effort is normal.      Breath sounds: Normal breath sounds. No wheezing or rales.   Abdominal:      General: Bowel sounds are normal. There is no distension.      Palpations: Abdomen is soft.      Tenderness: There is no abdominal tenderness.   Musculoskeletal:         General: No swelling.      Cervical back: Neck supple.   Skin:     General: Skin is warm.   Neurological:      Mental Status: He is alert and oriented to person, place, and time.   Psychiatric:         Mood and Affect: Mood normal.          Lab Results   Component Value Date    WBC 5.64 03/11/2024    HGB 12.3 03/11/2024    HCT 39.0 03/11/2024    MCV 99 (H) 03/11/2024     03/11/2024     Lab Results   Component Value Date    SODIUM 141 03/11/2024    K 4.3 03/11/2024     03/11/2024    CO2 31 03/11/2024    BUN 15 03/11/2024    CREATININE 0.63 03/11/2024    GLUC 80 03/11/2024    CALCIUM 8.4 03/11/2024     Lab Results   Component Value Date    INR 1.02 06/02/2022    INR 1.10 05/29/2022     INR 1.04 05/28/2022    PROTIME 13.0 06/02/2022    PROTIME 13.7 05/29/2022    PROTIME 13.2 05/28/2022           Current Facility-Administered Medications:     acetaminophen (TYLENOL) tablet 650 mg, 650 mg, Oral, Q6H PRN, Daniel Clayton MD, 650 mg at 03/14/24 0003    bisacodyl (DULCOLAX) rectal suppository 10 mg, 10 mg, Rectal, Daily PRN, Darwin Paz MD    clotrimazole (MYCELEX) davy 10 mg, 10 mg, Oral, 5x Daily, Darwin Paz MD, 10 mg at 03/14/24 1357    cyanocobalamin (VITAMIN B-12) tablet 500 mcg, 500 mcg, Oral, Daily, Darwin Paz MD, 500 mcg at 03/14/24 0801    enoxaparin (LOVENOX) subcutaneous injection 40 mg, 40 mg, Subcutaneous, Q24H JEANNIE, Alexandrea Lugo MD, 40 mg at 03/14/24 0801    lactulose (CHRONULAC) oral solution 20 g, 20 g, Oral, BID PRN, Darwin Paz MD, 20 g at 03/05/24 2107    loratadine (CLARITIN) tablet 10 mg, 10 mg, Oral, Daily, Amada L Dagnall, PA-C, 10 mg at 03/14/24 0801    meclizine (ANTIVERT) tablet 25 mg, 25 mg, Oral, Q8H PRN, Amada L Dagnall, PA-C    melatonin tablet 6 mg, 6 mg, Oral, HS, Amada L Dagnall, PA-C, 6 mg at 03/13/24 2137    midodrine (PROAMATINE) tablet 2.5 mg, 2.5 mg, Oral, BID AC, Amada L Dagnall, PA-C, 2.5 mg at 03/14/24 0801    multivitamin-minerals (CENTRUM) tablet 1 tablet, 1 tablet, Oral, Daily, Amada L Dagnall, PA-C, 1 tablet at 03/14/24 0801    polyethylene glycol (MIRALAX) packet 17 g, 17 g, Oral, Daily PRN, Ashley Depadua, MD, 17 g at 03/05/24 1740    pravastatin (PRAVACHOL) tablet 40 mg, 40 mg, Oral, Daily With Dinner, Amada Epperson PA-C, 40 mg at 03/13/24 1745    senna (SENOKOT) tablet 8.6 mg, 1 tablet, Oral, HS, Alexandrea Lugo MD, 8.6 mg at 03/13/24 2137    sertraline (ZOLOFT) tablet 25 mg, 25 mg, Oral, Daily, Amada Epperson PA-C, 25 mg at 03/14/24 0801    tamsulosin (FLOMAX) capsule 0.4 mg, 0.4 mg, Oral, Daily With Dinner, Darwin Paz MD, 0.4 mg at 03/13/24 1745    Past Medical History:   Diagnosis Date    Arthritis      Encounter for general adult medical examination without abnormal findings 03/20/2019    Fall 11/03/2022    Hyperlipidemia     Macular degeneration, 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[facility])     Urinary retention 06/02/2022       Patient Active Problem List    Diagnosis Date Noted    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    Behavior safety risk 02/20/2024    Hematuria 03/04/2024    Headache 02/20/2024    Bilateral lower extremity weakness 02/17/2024    Abnormal CT scan, lumbar spine 02/15/2024    Severe protein-calorie malnutrition (HCC) 02/14/2024    Debility 02/13/2024    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    Dysphagia 11/21/2023    Macular degeneration, age related 02/27/2023    Traumatic subdural hemorrhage with loss of consciousness of unspecified duration, initial encounter ([facility]) 02/27/2023    Sensorineural hearing loss (SNHL), bilateral 08/18/2022    Balance disorder 06/28/2022    Abnormal echocardiogram 06/27/2022    Right bundle-branch block 06/27/2022    Moderate protein-calorie malnutrition (HCC) 06/19/2022    Low BP 06/19/2022    Diastolic dysfunction 06/19/2022    EKG abnormalities 06/19/2022    Constipation due to neurogenic bowel 06/02/2022    Urinary retention 06/02/2022    SDH (subdural hematoma) ([facility]) 05/27/2022    Primary osteoarthritis of left knee 05/17/2022    Hygroma 04/26/2022    Right hand pain     Carpal tunnel syndrome on right     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      ** Please  Note:  voice to text software may have been used in the creation of this document. Although proof errors in transcription or interpretation are a potential of such software**

## 2024-03-14 NOTE — PROGRESS NOTES
Progress Note - Thomas Chase 83 y.o. male MRN: [id number]    Unit/Bed#: [facility] 213-02 Encounter: [id number]        Subjective:   Patient seen and examined at bedside after reviewing the chart and discussing the case with the caring staff.      Patient examined at bedside.  Patient has no acute symptoms.    Patient's blood pressures remain stable.  Urine culture pending.     Physical Exam   Vitals: Blood pressure 124/67, pulse 81, temperature 97.9 °F (36.6 °C), temperature source Temporal, resp. rate 18, height 6' (1.829 m), weight 59 kg (130 lb 1.1 oz), SpO2 98%.,Body mass index is 17.64 kg/m².  Constitutional: Patient in no acute distress.  HEENT: PERR, EOMI, MMM.  Cardiovascular: Normal rate and regular rhythm.    Pulmonary/Chest: Effort normal and breath sounds normal.   Abdomen: Soft, + BS, NT.    Assessment/Plan:  Thomas Chase is a(n) 83 y.o. year old male with left SDH and right SAH.     1.  Cardiac with hx of HTN, HLD/hypotension.  On pravastatin 40 mg daily (simvastatin nonformulary).  Noted to have low BP 3/10/24.   Patient's orthostatic vitals did not show significant drop.  Received IV fluids and started on midodrine 2.5 mg twice daily 3/12/24.  BP now improved, continue to monitor.  UA was ordered to rule out UTI as patient's CRP and sed rate were slightly elevated.  Urine culture pending.   2.  Allergic rhinitis.  Patient is on loratadine 10 mg daily.  3.  Depression/anxiety.  Patient is on Zoloft 25 mg daily.  4.  Insomnia.  Patient is on melatonin 6 mg at bedtime.  5.  Thrush.  Discontinue nystatin liquid swish and swallow 3/11/2024 and started on clotrimazole lozenges 3/11/2024.  If patient is unable to do this will restart nystatin liquid swish and swallow.  6.  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  Patient is mainly nonverbal.  7.  Urinary retention.  Urinary retention protocol.  Flomax decreased to 0.4 mg daily 3/12/2024 due to possible cause of hypotension.  Tushar  catheter placed 2/22/24, removed on 2/28/24, placed again 3/1/24, removed again 3/7/24 and reinserted 3/8/24.    8.  Dysphagia.  Speech therapy following.  Dysphagia 1 puureed with nectar thick liquid.  Video swallow study done on 3/11/2024.  9.  Vitamin B12 deficiency.  Patient started on vitamin B12 500 mcg daily.    10.  Pain and bowel management.  As per physiatrist.  11.  Intracranial hemorrhage.   Patient will be receiving intensive PT OT ST as per physiatrist.    Anticipated discharge date:  TBD.  PCP:  RONY Diaz    The patient was discussed with Dr. Paz and he is in agreement with the above note.

## 2024-03-14 NOTE — PROGRESS NOTES
"   03/14/24 1100   Pain Assessment   Pain Assessment Tool 0-10   Pain Score No Pain   Restrictions/Precautions   Precautions Aphasia;Aspiration;Cognitive;Fall Risk;Supervision on toilet/commode;Hard of hearing;Bed/chair alarms;Brooks   Weight Bearing Restrictions No   ROM Restrictions No   Cognitive Linguisitic Assessments   Cognitive Linquistic Quick Test (CLQT) BCAT 8/21   Comprehension   Assist Devices Hearing Aid   QI: Comprehension 2. Sometimes Understands: Understands only basic conversations or simple, direct phrases. Frequently requires cues to understand   Comprehension (FIM) 3 - Understands basic info/conversation 50-74% of time   Expression   Non-Verbal Basic   QI: Expression 2. Frequently exhibits difficulty with expressing needs and ideas   Expression (FIM) 3 - Expresses basic info/needs 50-74% of time   Social Interaction   Social Interaction (FIM) 4 - Interacts 75-89% of time   Problem Solving   Problem solving (FIM) 2 - Solves basic problems 25-49% of time   Memory   Memory (FIM) 2 - Recognizes, recalls/performs 25-49%   Memory Skills   Orientation Level Oriented to person;Oriented to place   Speech/Language/Cognition Assessmetn   Treatment Assessment Poor insight today, pt does not utilize call bell.  Pt staning out of chair with staff running to room, encouraged pt to sit.  Pt insists he has to use the bathroom \"number two.\"  When attempting toileting pt without BM, reports \"pee\" but brooks in place.  Walked easily back to chair.  Pt demonstrating attempt to leave chair x2 during our session together, does not communicate need.   Consistencies Assessed and Performance   Materials Admnistered Nectar thick liquid;Puree/Level 1   Oral Stage Mild impaired   Phargngeal Stage Moderate impaired   Swallow Mechanics Mild delayed;Swallow initation;Moderate delayed   Strategies and Efficacy small bites, small sips, slow rate   Recommendations   Risk for Aspiration Present   Recommendations Consider oral diet "   Diet Solid Recommendation Level 1 Dysphagia/pureed   Diet Liquid Recommendation Nectar thick liquid   Recommended Form of Meds Crushed;With puree   General Precautions Upright as possible for all oral intake   Compensatory Swallowing Strategies Alternate solids and liquids   Results Reviewed with PT/Family/Caregiver   Eating   Type of Assistance Needed Supervision   Physical Assistance Level No physical assistance   Comment Decreased carryover of safety/strategies demonstrated by pt's spouse at this time.  Bringing pt ice cream previously and asking if she can give him water in a regular cup.  Extensive education, minimal to no carryover.   Eating CARE Score 4   Swallow Assessment   Swallow Treatment Assessment Received OOB upright attempting to leave recliner.  Pt attempted toileting with no BM as reported by pt.  NTL via provalve cup for >2 oz.  Pt requesting he return to bed.  Pt demonstrates no overt s/s of penetration or aspiration.  Dehydration remains a concern.  Pt is not eligible for diet advancement per last VBS.  Puree/NTL ongoing with strategies in place.  Encourage PO fluids throughout the day as able by all therapies and staff.  NTL tolerance demonstrated.   SLP Therapy Minutes   SLP Time In 1100   SLP Time Out 1130   SLP Total Time (minutes) 30   SLP Mode of treatment - Individual (minutes) 30   SLP Mode of treatment - Concurrent (minutes) 0   SLP Mode of treatment - Group (minutes) 0   SLP Mode of treatment - Co-treat (minutes) 0   SLP Mode of Treatment - Total time(minutes) 30 minutes   SLP Cumulative Minutes 885   Therapy Time missed   Time missed? No

## 2024-03-14 NOTE — NURSING NOTE
Patient awake and alert . OOB in bedside recliner for breakfast and lunch.  Supervised with meals.  Calorie count completed per MD order.  Participated in therapy sessions and tolerated same well.  Finley catheter patent and draining clear tiana urine.  Wife supportive at bedside.  No complaints of pain or discomfort noted.

## 2024-03-14 NOTE — PROGRESS NOTES
03/14/24 1137   Pain Assessment   Pain Assessment Tool 0-10   Pain Score No Pain   Restrictions/Precautions   Precautions Aphasia;Aspiration;Bed/chair alarms;Cognitive;Fall Risk;Hard of hearing;[place];Supervision on toilet/commode   Weight Bearing Restrictions No   ROM Restrictions No   Eating   Type of Assistance Needed Supervision;Verbal cues   Comment Wife present and assisted with setup of tray and pt with clothing protection. pt self fed all desired foods with supervision and occasional visual cues to continue eating, alternating between solids and liquids. pt ate approx 75% of food on tray; I'ly reached for provalve cup; coughing noted <25% of the time with rest periods as needed   Eating CARE Score 4   Eating Assessment   Food To Mouth Yes   Positioning Upright;Out of Bed   Meal Assessed Lunch   Opens Packages No   Communication   Communication   (pt able to participate in conversation with OT providign written questions/ information and pt responding with 50% accuracy and wife available to assist with communication for other 50% since this pt is new to this therapist)   Exercise Tools   Hand Gripper gripper with pegs R hand with cues/ education and min A following placement into boar [facility] R hand and verbal cues for attnetion tot ask and task completion   Cognition   Orientation Level Oriented to person;Oriented to place   Assessment   Treatment Assessment Pt presents resting in recliner agreeable to OT session including BUE therex/ theract and meal completion all while comminucating with OT new to this patient. Pt tolerates session without complaints and is making gains towards goals. Pt requires assist due to decreased balance, safety/ cognition/ expressive aphasia, endurance, and strength/ ROM/ coordination. pt will benefit from conitnued skilled OT services to increase independence with daily tasks.   Problem List Decreased strength;Decreased range of motion;Decreased endurance;Impaired  balance;Decreased coordination;Decreased cognition;Decreased safety awareness;Impaired hearing   Plan   Treatment/Interventions ADL retraining;Functional transfer training;Therapeutic exercise;Endurance training;Patient/family training;Equipment eval/education;Compensatory technique education;Cognitive reorientation   Progress Progressing toward goals   OT Therapy Minutes   OT Time In 1137   OT Time Out 1240   OT Total Time (minutes) 63   OT Mode of treatment - Individual (minutes) 63   Therapy Time missed   Time missed? No

## 2024-03-15 PROCEDURE — 92526 ORAL FUNCTION THERAPY: CPT | Performed by: NURSE PRACTITIONER

## 2024-03-15 PROCEDURE — 97530 THERAPEUTIC ACTIVITIES: CPT

## 2024-03-15 PROCEDURE — 99232 SBSQ HOSP IP/OBS MODERATE 35: CPT | Performed by: PHYSICAL MEDICINE & REHABILITATION

## 2024-03-15 PROCEDURE — 97110 THERAPEUTIC EXERCISES: CPT

## 2024-03-15 PROCEDURE — 97535 SELF CARE MNGMENT TRAINING: CPT

## 2024-03-15 PROCEDURE — 97537 COMMUNITY/WORK REINTEGRATION: CPT

## 2024-03-15 PROCEDURE — 97116 GAIT TRAINING THERAPY: CPT

## 2024-03-15 RX ADMIN — MIDODRINE HYDROCHLORIDE 2.5 MG: 2.5 TABLET ORAL at 07:55

## 2024-03-15 RX ADMIN — SENNOSIDES 8.6 MG: 8.6 TABLET, FILM COATED ORAL at 21:05

## 2024-03-15 RX ADMIN — CLOTRIMAZOLE 10 MG: 10 LOZENGE ORAL; TOPICAL at 21:05

## 2024-03-15 RX ADMIN — SERTRALINE HYDROCHLORIDE 25 MG: 25 TABLET ORAL at 08:52

## 2024-03-15 RX ADMIN — Medication 6 MG: at 21:05

## 2024-03-15 RX ADMIN — PRAVASTATIN SODIUM 40 MG: 40 TABLET ORAL at 17:40

## 2024-03-15 RX ADMIN — CLOTRIMAZOLE 10 MG: 10 LOZENGE ORAL; TOPICAL at 13:12

## 2024-03-15 RX ADMIN — ACETAMINOPHEN 650 MG: 325 TABLET ORAL at 21:05

## 2024-03-15 RX ADMIN — Medication 1 TABLET: at 08:52

## 2024-03-15 RX ADMIN — CLOTRIMAZOLE 10 MG: 10 LOZENGE ORAL; TOPICAL at 11:22

## 2024-03-15 RX ADMIN — CYANOCOBALAMIN TAB 500 MCG 500 MCG: 500 TAB at 08:52

## 2024-03-15 RX ADMIN — CLOTRIMAZOLE 10 MG: 10 LOZENGE ORAL; TOPICAL at 17:40

## 2024-03-15 RX ADMIN — ENOXAPARIN SODIUM 40 MG: 40 INJECTION SUBCUTANEOUS at 08:52

## 2024-03-15 RX ADMIN — TAMSULOSIN HYDROCHLORIDE 0.4 MG: 0.4 CAPSULE ORAL at 17:40

## 2024-03-15 RX ADMIN — LORATADINE 10 MG: 10 TABLET ORAL at 08:52

## 2024-03-15 NOTE — PROGRESS NOTES
03/15/24 1230   Pain Assessment   Pain Assessment Tool 0-10   Pain Score No Pain   Restrictions/Precautions   Precautions Aspiration;Bed/chair alarms;Aphasia;Cognitive;Fall Risk;[place];Supervision on toilet/commode;Hard of hearing   Cognition   Overall Cognitive Status Impaired   Arousal/Participation Alert;Responsive;Cooperative   Attention Attends with cues to redirect   Orientation Level Oriented to person;Oriented to place   Memory Decreased short term memory;Decreased recall of recent events;Decreased recall of precautions   Following Commands Follows one step commands with increased time or repetition   Therapeutic Interventions   Strengthening seated ther ex   Assessment   Treatment Assessment Patient agreeable to therapy session.  Remains pain free.   Cues for task completion with ther ex.   Patient able to verbalize his name, therapist's name, that he was done with eating and that he wanted the bed.     Completed ther ex for general LE strengthening.   Patient left in recliner chair with all needs within reach and alarms in place.   PT Barriers   Physical Impairment Decreased strength;Decreased range of motion;Decreased endurance;Impaired balance;Decreased mobility;Decreased coordination;Impaired judgement;Decreased safety awareness;Impaired hearing   Functional Limitation Car transfers;Ramp negotiation;Stair negotiation;Standing;Transfers;Walking;Wheelchair management   Plan   Treatment/Interventions LE strengthening/ROM;Therapeutic exercise   Progress Progressing toward goals   PT Therapy Minutes   PT Time In 1230   PT Time Out 1300   PT Total Time (minutes) 30   PT Mode of treatment - Individual (minutes) 30   PT Mode of treatment - Concurrent (minutes) 0   PT Mode of treatment - Group (minutes) 0   PT Mode of treatment - Co-treat (minutes) 0   PT Mode of Treatment - Total time(minutes) 30 minutes   PT Cumulative Minutes 1733   Therapy Time missed   Time missed? No

## 2024-03-15 NOTE — PROGRESS NOTES
Progress Note - Thomas Chase 83 y.o. male MRN: [id number]    Unit/Bed#: [facility] 213-02 Encounter: [id number]        Subjective:   Patient seen and examined at bedside after reviewing the chart and discussing the case with the caring staff.      Patient examined at bedside.  Patient has no acute symptoms.    Physical Exam   Vitals: Blood pressure 110/67, pulse 70, temperature 98.5 °F (36.9 °C), temperature source Temporal, resp. rate 16, height 6' (1.829 m), weight 59 kg (130 lb 1.1 oz), SpO2 94%.,Body mass index is 17.64 kg/m².  Constitutional: Patient in no acute distress.  HEENT: PERR, EOMI, MMM.  Cardiovascular: Normal rate and regular rhythm.    Pulmonary/Chest: Effort normal and breath sounds normal.   Abdomen: Soft, + BS, NT.    Assessment/Plan:  Thomas Chase is a(n) 83 y.o. year old male with left SDH and right SAH.     1.  Cardiac with hx of HTN, HLD/hypotension.  On pravastatin 40 mg daily (simvastatin nonformulary).  Noted to have low BP 3/10/24.   Patient's orthostatic vitals did not show significant drop.  Received IV fluids and started on midodrine 2.5 mg twice daily 3/12/24.  BP now improved, continue to monitor.  UA was ordered to rule out UTI as patient's CRP and sed rate were slightly elevated.  [place] texted on call ID regarding urine culture who recommended no treatment.  Patient asymptomatic, does not have any urinary symptoms, flank pain.    2.  Allergic rhinitis.  Patient is on loratadine 10 mg daily.  3.  Depression/anxiety.  Patient is on Zoloft 25 mg daily.  4.  Insomnia.  Patient is on melatonin 6 mg at bedtime.  5.  Thrush.  Discontinue nystatin liquid swish and swallow 3/11/2024 and started on clotrimazole lozenges 3/11/2024.  If patient is unable to do this will restart nystatin liquid swish and swallow.  6.  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  Patient is mainly nonverbal.  7.  Urinary retention.  Urinary retention protocol.  Flomax decreased to 0.4 mg daily  3/12/2024 due to possible cause of hypotension.  Finley catheter placed 2/22/24, removed on 2/28/24, placed again 3/1/24, removed again 3/7/24 and reinserted 3/8/24.    8.  Dysphagia.  Speech therapy following.  Dysphagia 1 puureed with nectar thick liquid.  Video swallow study done on 3/11/2024.  9.  Vitamin B12 deficiency.  Patient started on vitamin B12 500 mcg daily.    10.  Pain and bowel management.  As per physiatrist.  11.  Intracranial hemorrhage.   Patient will be receiving intensive PT OT ST as per physiatrist.    Anticipated discharge date:  TBD.  PCP:  RONY Diaz    The patient was discussed with Dr. Paz and he is in agreement with the above note.

## 2024-03-15 NOTE — PLAN OF CARE
Problem: PAIN - ADULT  Goal: Verbalizes/displays adequate comfort level or baseline comfort level  Description: Interventions:  - Encourage patient to monitor pain and request assistance  - Assess pain using appropriate pain scale  - Administer analgesics based on type and severity of pain and evaluate response  - Implement non-pharmacological measures as appropriate and evaluate response  - Consider cultural and social influences on pain and pain management  - Notify physician/advanced practitioner if interventions unsuccessful or patient reports new pain  Outcome: Progressing     Problem: INFECTION - ADULT  Goal: Absence or prevention of progression during hospitalization  Description: INTERVENTIONS:  - Assess and monitor for signs and symptoms of infection  - Monitor lab/diagnostic results  - Monitor all insertion sites, i.e. indwelling lines, tubes, and drains  - Monitor endotracheal if appropriate and nasal secretions for changes in amount and color  - [place] appropriate cooling/warming therapies per order  - Administer medications as ordered  - Instruct and encourage patient and family to use good hand hygiene technique  - Identify and instruct in appropriate isolation precautions for identified infection/condition  Outcome: Progressing  Goal: Absence of fever/infection during neutropenic period  Description: INTERVENTIONS:  - Monitor WBC    Outcome: Progressing

## 2024-03-15 NOTE — PROGRESS NOTES
03/15/24 1000   Pain Assessment   Pain Assessment Tool 0-10   Pain Score No Pain   Restrictions/Precautions   Precautions Aspiration;Bed/chair alarms;Aphasia;Cognitive;Fall Risk;[place];Supervision on toilet/commode;Hard of hearing   Cognition   Overall Cognitive Status Impaired   Arousal/Participation Alert;Responsive;Cooperative   Attention Attends with cues to redirect   Orientation Level Oriented to person;Oriented to place   Memory Decreased short term memory;Decreased recall of recent events;Decreased recall of precautions   Following Commands Follows one step commands with increased time or repetition   Sit to Lying   Type of Assistance Needed Supervision   Comment increased visual/tactile cues   Sit to Lying CARE Score 4   Sit to Stand   Type of Assistance Needed Supervision   Comment close S with RW and increased visual/tactile cues   Sit to Stand CARE Score 4   Bed-Chair Transfer   Type of Assistance Needed Incidental touching   Comment cg with RW and increased visual/tactile cues   Chair/Bed-to-Chair Transfer CARE Score 4   Walk 10 Feet   Type of Assistance Needed Incidental touching;Verbal cues   Comment cg level and unlevel surfaces with RW and increased visual/tactile cues for sequence and direction   Walk 10 Feet CARE Score 4   Walk 50 Feet with Two Turns   Type of Assistance Needed Incidental touching;Verbal cues   Comment cg level and unlevel surfaces with RW and increased visual/tactile cues for sequence and direction   Walk 50 Feet with Two Turns CARE Score 4   Walk 150 Feet   Type of Assistance Needed Incidental touching;Verbal cues   Comment cg level and unlevel surfaces with RW and increased visual/tactile cues for sequence and direction   Walk 150 Feet CARE Score 4   Walking 10 Feet on Uneven Surfaces   Type of Assistance Needed Incidental touching;Verbal cues   Comment cg level and unlevel surfaces with RW and increased visual/tactile cues for sequence and direction   Walking 10 Feet on  Uneven Surfaces CARE Score 4   Ambulation   Primary Mode of Locomotion Prior to Admission Walk   Distance Walked (feet) 193 ft  (173' 193')   Assist Device Roller Walker   Gait Pattern Inconsistant Maria Victoria;Slow Maria Victoria;Decreased foot clearance;Forward Flexion;Narrow ILIANA   Limitations Noted In Balance;Coordination;Endurance;Posture;Safety;Speed;Strength   Provided Assistance with: Balance   Walk Assist Level Contact Guard   Findings cg level and unlevel surfaces with RW and increased visual/tactile cues for sequence and direction   Does the patient walk? 2. Yes   Curb or Single Stair   Style negotiated Single stair   Type of Assistance Needed Incidental touching;Supervision   Comment cg/close S 14 steps with 2 handrails; increased visual/tactile cues for sequence/technique   1 Step (Curb) CARE Score 4   4 Steps   Type of Assistance Needed Incidental touching;Supervision   Comment cg/close S 14 steps with 2 handrails; increased visual/tactile cues for sequence/technique   4 Steps CARE Score 4   12 Steps   Type of Assistance Needed Incidental touching;Supervision   Comment cg/close S 14 steps with 2 handrails; increased visual/tactile cues for sequence/technique   12 Steps CARE Score 4   Stairs   Type Stairs   # of Steps 14   Weight Bearing Precautions Fall Risk;WBAT   Assist Devices Bilateral Rail   Findings cg/close S 14 steps with 2 handrails; increased visual/tactile cues for sequence/technique   Therapeutic Interventions   Strengthening supine ther ex   Balance gait and transfer training   Other stair negotiation   Assessment   Treatment Assessment Patient agreeable to therapy session.   No pain reported.    Cues needed for general safety as well as sequence/technique/directon for all functional tasks.  Patient also dependent for [place] management.  Completed ther ex for general LE strengthening; gait and transfer training focusing on sequence and technique for improved balance and safety with functional mobility  using walker.  Negotiated steps with 2 handrails and cues for sequence/technique.   PT Barriers   Physical Impairment Decreased strength;Decreased range of motion;Decreased endurance;Impaired balance;Decreased mobility;Decreased coordination;Impaired judgement;Decreased safety awareness;Impaired hearing   Functional Limitation Car transfers;Ramp negotiation;Stair negotiation;Standing;Transfers;Walking;Wheelchair management   Plan   Treatment/Interventions Functional transfer training;LE strengthening/ROM;Elevations;Therapeutic exercise;Bed mobility;Gait training   Progress Progressing toward goals   PT Therapy Minutes   PT Time In 1000   PT Time Out 1100   PT Total Time (minutes) 60   PT Mode of treatment - Individual (minutes) 60   PT Mode of treatment - Concurrent (minutes) 0   PT Mode of treatment - Group (minutes) 0   PT Mode of treatment - Co-treat (minutes) 0   PT Mode of Treatment - Total time(minutes) 60 minutes   PT Cumulative Minutes 1703   Therapy Time missed   Time missed? No

## 2024-03-15 NOTE — NURSING NOTE
Patient to go to [facility] tomorrow 3/16 for CT scan at 11:00.  by [facility] at 10:15. Wife aware of same.

## 2024-03-15 NOTE — PROGRESS NOTES
03/15/24 1300   Pain Assessment   Pain Assessment Tool 0-10   Pain Score No Pain   Pain Rating: FLACC (Rest) - Face 0   Pain Rating: FLACC (Rest) - Legs 0   Pain Rating: FLACC (Rest) - Activity 0   Pain Rating: FLACC (Rest) - Cry 0   Pain Rating: FLACC (Rest) - Consolability 0   Score: FLACC (Rest) 0   Speech/Language/Cognition Assessmetn   Treatment Assessment Pt received OOB awake and alert in chair. Completed effortful swallow x5/10 and tongue push up resistance x10 with hold for 3 seconds with visual cue. Water via 5 CC cup presenting with mild anterior loss and cough 60% of the time, nectar thick liquids via 10CC cup no anterior loss with cough 30% of the time. open cup water with handles mod-max anterior loss discontinued. Trialed water via straw with direct control of single sips via straw pinching and removal without any anterior loss and cough only ~20% of the time ( last trial very significant episode of coughing though with cough lasting up to 15 seconds). Nectar thick liquids with direct control of single sips via straw pinching and removal with cough 10% of the time. Will continue working towards this strategy training with 1-2 more speech sessions with hopes to increase fluid intake this way but 1:1 assistance needs to be completed with this as patient unable to complete this strategy independently. Continue same diet recs for now.   SLP Therapy Minutes   SLP Time In 1300   SLP Time Out 1330   SLP Total Time (minutes) 30   SLP Mode of treatment - Individual (minutes) 30   SLP Mode of treatment - Concurrent (minutes) 0   SLP Mode of treatment - Group (minutes) 0   SLP Mode of treatment - Co-treat (minutes) 0   SLP Mode of Treatment - Total time(minutes) 30 minutes   SLP Cumulative Minutes 915

## 2024-03-15 NOTE — PROGRESS NOTES
03/15/24 0745   Pain Assessment   Pain Assessment Tool 0-10   Pain Score No Pain   Restrictions/Precautions   Precautions Aphasia;Aspiration;Bed/chair alarms;Cognitive;Fall Risk;[place];Supervision on toilet/commode;Hard of hearing  (nectar liquids)   Weight Bearing Restrictions No   ROM Restrictions No   Eating   Type of Assistance Needed Supervision   Eating CARE Score 4   Eating Assessment   Food To Mouth Yes   Positioning Upright;Out of Bed   Meal Assessed Breakfast   Opens Packages No   Findings pt able to use spoon for meal completion of 50% hot breakfast requiring increased encouragement for solid intake; slow rate cup for juice and straw for shake with small sips encouraged, OT provided setup simialr to that of setup yesterday when wife present. Pt able to reach for liquids.   Oral Hygiene   Type of Assistance Needed Physical assistance   Physical Assistance Level 25% or less   Oral Hygiene CARE Score 3   Grooming   Able To Initiate Tasks;Comb/Brush Hair;Wash/Dry Face;Brush/Clean Teeth;Wash/Dry Hands   Limitation Noted In Coordination;Problem Solving;Safety;Sequencing;Strength   Findings S/ min A for grooming at the sink with nectar liquids and cues to rinse mouth   Shower/Bathe Self   Type of Assistance Needed Physical assistance   Physical Assistance Level 26%-50%   Comment cues for sequencing task   Shower/Bathe Self CARE Score 3   Bathing   Assessed Bath Style Sponge Bath   Anticipated D/C Bath Style Sponge Bath   Able to Gather/Transport No   Able to Adjust Water Temperature No   Able to Wash/Rinse/Dry (body part) Left Arm;Right Arm;L Upper Leg;R Upper Leg;Chest;Abdomen   Limitations Noted in Balance;Coordination;Endurance;Problem Solving;ROM;Safety;Strength;Sequencing   Positioning Seated;Standing   Findings  OT assisted with bathing, RN completed brooks care and monitoring of skin/ dressings   Tub/Shower Transfer   Reason Not Assessed Sponge Bath;Safety   Upper Body Dressing   Type of Assistance  Needed Physical assistance   Physical Assistance Level 25% or less   Upper Body Dressing CARE Score 3   Lower Body Dressing   Type of Assistance Needed Physical assistance   Physical Assistance Level 76% or more   Lower Body Dressing CARE Score 2   Putting On/Taking Off Footwear   Type of Assistance Needed Physical assistance   Physical Assistance Level 76% or more   Putting On/Taking Off Footwear CARE Score 2   Dressing/Undressing Clothing   Remove UB Clothes Pullover Shirt   Don UB Clothes Pullover Shirt   Remove LB Clothes Shorts;Undergarment;Socks;Other;Shoes  (CAM boot)   Don LB Clothes Shorts;Undergarment;Socks;Shoes;Other  (CAM boot)   Limitations Noted In Balance;Coordination;Endurance;Problem Solving;Safety;Sequencing;Strength;ROM   Positioning Supported Sit;Standing   Sit to Stand   Type of Assistance Needed Incidental touching;Verbal cues   Sit to Stand CARE Score 4   Bed-Chair Transfer   Type of Assistance Needed Physical assistance;Verbal cues   Physical Assistance Level 25% or less   Comment assist to manage brooks   Chair/Bed-to-Chair Transfer CARE Score 3   Cognition   Orientation Level Oriented to person;Oriented to place   Vision   Vision Comments decreased visual scanning wagner to the Left during ADL/ meal completion   Assessment   Treatment Assessment Pt presents supine agreeable to OT session including ADLs, grooming, transfers/ standing and meal completion. Pt tolerates session without complaints however does require increased cues for meal completion. Pt requires assist due to decreased balance, safety, endurance, strength/ ROM and aphasia/ [place]/ cognition. Pt is progressing towards goals and will benefit from continues skilled OT services to increase independence with daily tasks. Communication via written directions/ questions using dry erase board with pt able to communicate verbally with response intellegible 75% time.   Problem List Decreased strength;Decreased range of motion;Decreased  endurance;Impaired balance;Decreased coordination;Decreased cognition;Decreased safety awareness;Impaired hearing   Plan   Treatment/Interventions ADL retraining;Functional transfer training;Therapeutic exercise;Endurance training;Patient/family training;Equipment eval/education;Cognitive reorientation;Compensatory technique education   Progress Progressing toward goals   OT Therapy Minutes   OT Time In 0745   OT Time Out 0847   OT Total Time (minutes) 62   OT Mode of treatment - Individual (minutes) 62   Therapy Time missed   Time missed? No

## 2024-03-15 NOTE — PLAN OF CARE
Problem: PAIN - ADULT  Goal: Verbalizes/displays adequate comfort level or baseline comfort level  Description: Interventions:  - Encourage patient to monitor pain and request assistance  - Assess pain using appropriate pain scale  - Administer analgesics based on type and severity of pain and evaluate response  - Implement non-pharmacological measures as appropriate and evaluate response  - Consider cultural and social influences on pain and pain management  - Notify physician/advanced practitioner if interventions unsuccessful or patient reports new pain  Outcome: Progressing     Problem: INFECTION - ADULT  Goal: Absence or prevention of progression during hospitalization  Description: INTERVENTIONS:  - Assess and monitor for signs and symptoms of infection  - Monitor lab/diagnostic results  - Monitor all insertion sites, i.e. indwelling lines, tubes, and drains  - Monitor endotracheal if appropriate and nasal secretions for changes in amount and color  - [place] appropriate cooling/warming therapies per order  - Administer medications as ordered  - Instruct and encourage patient and family to use good hand hygiene technique  - Identify and instruct in appropriate isolation precautions for identified infection/condition  Outcome: Progressing  Goal: Absence of fever/infection during neutropenic period  Description: INTERVENTIONS:  - Monitor WBC    Outcome: Progressing     Problem: Nutrition/Hydration-ADULT  Goal: Nutrient/Hydration intake appropriate for improving, restoring or maintaining nutritional needs  Description: Monitor and assess patient's nutrition/hydration status for malnutrition. Collaborate with interdisciplinary team and initiate plan and interventions as ordered.  Monitor patient's weight and dietary intake as ordered or per policy. Utilize nutrition screening tool and intervene as necessary. Determine patient's food preferences and provide high-protein, high-caloric foods as appropriate.      INTERVENTIONS:  - Monitor oral intake, urinary output, labs, and treatment plans  - Assess nutrition and hydration status and recommend course of action  - Evaluate amount of meals eaten  - Assist patient with eating if necessary   - Allow adequate time for meals  - Recommend/ encourage appropriate diets, oral nutritional supplements, and vitamin/mineral supplements  - Order, calculate, and assess calorie counts as needed  - Recommend, monitor, and adjust tube feedings and TPN/PPN based on assessed needs  - Assess need for intravenous fluids  - Provide specific nutrition/hydration education as appropriate  - Include patient/family/caregiver in decisions related to nutrition  Outcome: Progressing     Problem: Prexisting or High Potential for Compromised Skin Integrity  Goal: Skin integrity is maintained or improved  Description: INTERVENTIONS:  - Identify patients at risk for skin breakdown  - Assess and monitor skin integrity  - Assess and monitor nutrition and hydration status  - Monitor labs   - Assess for incontinence   - Turn and reposition patient  - Assist with mobility/ambulation  - Relieve pressure over bony prominences  - Avoid friction and shearing  - Provide appropriate hygiene as needed including keeping skin clean and dry  - Evaluate need for skin moisturizer/barrier cream  - Collaborate with interdisciplinary team   - Patient/family teaching  - Consider wound care consult   Outcome: Progressing

## 2024-03-15 NOTE — PROGRESS NOTES
PM&R PROGRESS NOTE:  Thomas Chase 83 y.o. male MRN: [id number]  Unit/Bed#: -02 Encounter: [id number]        **CHANGE IN REHAB DIAGNOSIS FROM PRE-ADMISSION SCREEN  Rehab Diagnosis: Impairment of mobility, safety, Activities of Daily Living (ADLs), and cognitive/communication skills due to Brain Dysfunction:  02.22  Traumatic, Closed Injury  Etiologic Diagnosis: L SDH, R SAH, L anterior hypodensity lateral temporal lobe    HPI: Thomas Chase is a 83 y.o. male with past medical history significant for previous fall with resultant traumatic brain injury-subdural hematoma in 2022 status post left craniotomy, chronic aphasia, hard of hearing with cochlear implant in place, hyperlipidemia who presented to the [facility] on 2/13/2024 with increased weakness and mental status change for several days.  Unclear if patient had head trauma.  CT head showing a 3 mm left frontal subdural hemorrhage.  CT lumbar spine showing DJD.  Patient found to have dysphagia and was seen by speech therapy.  VFSS on 2/15/2024 cleared him for a puréed diet with thin liquids.  Patient was also started by psychiatry for depression and started on Zoloft 25 mg daily.  Patient sustained a rapid response and a unwitnessed fall with head trauma notable bump on head on 2/15/2024.  CT head showing a new small volume acute subarachnoid hemorrhage in the left frontal opercular region as well as new nondisplaced fracture of the zygomatic arch with soft tissue contusion, stable 3 mm subdural hemorrhage seen previously.  Patient transferred to [facility] for further evaluation.  Patient seen by neurology and neurosurgery.  CTA head and neck showing a left anterior hypodensity in the left anterior lateral temporal lobe with 2 punctate hyperdensities.  These represented possible nonhemorrhagic contusions or venous infarcts.  Less likely to represent ischemia or neoplasm.  Follow-up imaging recommended with CT head in 2-3 weeks.  CT  venogram showing patent dural venous sinuses.  Patient was cleared for DVT prophylaxis and started on Keppra for seizure prophylaxis.    Medical course complicated by suspected UTI based on UA and symptoms.  Patient was treated with antibiotics x 3 days.  After medical stabilization, patient found to have acute functional deficits from his baseline in mobility, self-care, speech swallow cognition therefore admitted to [facility] for acute inpatient rehabilitation.    SUBJECTIVE: Patient seen face to face.  No acute new issues.  Progressing as expected.  Denies pain.  In a good mood.  Discharge planning for Monday.     ASSESSMENT: Stable, progressing      PLAN:    Rehabilitation  Functional deficits: Aphasia, dysphagia, impaired cognition, impaired balance, impulsivity, poor safety awareness impaired mobility, self care    Continue current rehabilitation plan of care to maximize function.    Expected Discharge: TBD probable SNF referrals to be made as wife interested in this option.  Probable transition on Monday to [facility]    Current Function:  Physical Therapy Occupational Therapy Speech Therapy   Weight Bearing Status: Weight Bearing as Tolerated  Transfers: Incidental Touching, Supervision (increased visual/tactile cues)  Bed Mobility: Supervision (bed rails; increased visual/tactile cues)  Amulation Distance (ft): 190 feet  Ambulation: Incidental Touching (increased visual/tactile cues for sequence/technique/direction)  Assistive Device for Ambulation: Roller Walker  Wheelchair Mobility Distance: 133 ft  Wheelchair Mobility: Maximum Assistance (max visual and tactile cues)  Number of Stairs: 14  Assistive Device for Stairs: Bilateral Hand Rails  Stair Assistance: Incidental Touching (increased visual/tactile cues for sequence/technique)  Ramp: Incidental Touching (increased visual/tactile cues for sequence/technique)  Assistive Device for Ramp: Roller Walker  Discharge Recommendations: Home with:  DC Home  with:: 24 Hour Assisteance, Family Support, First Floor Setup, Home Physical Therapy (vs alternate placement)   Eating: Supervision  Grooming: Supervision  Bathing: Minimal Assistance  Bathing: Minimal Assistance  Upper Body Dressing: Minimal Assistance  Lower Body Dressing: Maximum Assistance  Toileting: Maximum Assistance  Tub/Shower Transfer: Minimal Assistance (during dry transfer practice)  Toilet Transfer: Incidental Touching  Cognition: Exceptions to WNL  Cognition: Decreased Memory, Decreased Executive Functions, Decreased Attention, Decreased Comprehension  Orientation: Person, Place   Mode of Communication: Non-verbal  Speech/Language: Expressive Aphasia  Cognition: Exceptions to WNL  Cognition: Decreased Memory, Decreased Executive Functions, Decreased Attention, Decreased Comprehension, Decreased Safety  Orientation: Person, Place, Time  Swallowing: Exceptions to WNL  Swallowing: Oral Dysphagia, Pharyngeal Dysphagia, Aspiration Risk  Diet Recommendations: Level 1/Ambrosio Qiu Thick  Discharge Recommendations:  (home with 24/7 supervision vs. SNF)  DC Home with::  (continued speech therapy services recommended)       DVT prophylaxis  Continue Lovenox      * Intracranial hemorrhage (HCC)  Assessment & Plan  Initial presentation on 2/13/2024 with increased weakness in the legs, mental status changes for several days  CT head showing a 3 mm left frontal subdural hemorrhage  Rapid response 2/15/2024 with fall and head trauma  CT head showing a new small volume acute subarachnoid hemorrhage in the left frontal opercular region and new nondisplaced fracture of the zygomatic arch with soft tissue contusion.    Transferred to [facility]  CTA head and neck and and repeat CT head showing a left anterior hypodensity in the left anterior lateral temporal lobe with 2 punctate hyperdensities.  Differential diagnosis nonhemorrhagic contusions or venous infarcts.  Patient seen by neurology and neurosurgery  Conservative  management  Cleared for DVT prophylaxis  Started on Keppra for seizure prophylaxis  Repeat CT head scheduled on Monday, 3/4/2024 -personally reviewed  Radiology read as follows:  Improving left anterior temporal lobe contusions with resolving edema. Stable subacute subdural hemorrhage along the anterior left frontal convexity. Resolved subarachnoid and intraventricular hemorrhages. Unchanged minimal hyperdensity beneath the left craniotomy flap. Stable right parafalcine subdural hygroma. There is no new intra or extra-axial hemorrhage.  Follow-up with neurosurgery on 3/6/2024 virtually completed.  Recommendations as follows: Neurosurgery will sign off, patient will follow-up in 2 weeks with repeat CT head   Follow-up with neurosurgery and neurology in outpatient clinic.  Neurology virtual visit for next week    Behavior safety risk  Assessment & Plan  Patient demonstrating mild impulsivity in acute care  Fall x 2 in acute care  Falls at home in the past    Safety behavior plan going well and ARC.  No further impulsivity  Nursing and staff to provide close supervision near nursing station  Patient placed on 4 side rails (not as a restraint, patient and wife preference)  Monitor sleep-wake cycle  Avoid overstimulation: 1 visitor at a time, low lights, low sounds      Hematuria  Assessment & Plan  Started 3/2 - 3/3/2024  Minor, and more likely related to irritation from Finley catheter  Irrigation as needed per urology  Asymptomatic  H&H stable    Headache  Assessment & Plan  Resolved  Continue Tylenol 650 mg PRN     Severe protein-calorie malnutrition (HCC)  Assessment & Plan  BMI 17.64  Nutrition following  Optimize oral intake  Supplements ordered  Calorie count ordered - he is eating % of meals  Hydration is variable  Maintain IV  IVF PRN    Dysphagia  Assessment & Plan  VFSS completed 2/15/2024  Cleared for a puréed diet with nectar thick liquids  SLP to follow while at [facility]  Repeat VFSS 3/11/24: Mild oral  dysphagia, Moderate oral-pharyngeal dysphagia  Recommendations:  Diet: Dysphagia 1 pureed   Liquids: Nectar   Patient doing very well with oral food eating % of all of his meals.  Also gaining weight.  At times has difficulty with fluid intake due to nectar thick liquids and dysphagia.  Repeat swallow study recommended in 3-4 weeks  Supplement with IV fluids if needed  SLP also working towards a free water nectar thick protocol with oral care prior      Low BP  Assessment & Plan  Resolved  Continue midodrine 2.5 mg BID (3/12/24)    BP lower since 3/10/2024  Compression stockings and abdominal binder ordered  Internal medicine ordering IV fluid bolus.  I continued IVF x 1 L which was completed  BP continues to be very low especially in standing.   Labs reviewed - nothing to explain low BP  UA equivocal.- neg.   Culture was done by internal medicine.  Likely colonized.  Asymptomatic. No treatment after their discussion with ID.         Urinary retention  Assessment & Plan  Finley catheter removed on ARC  Trial of void underway  Mixed pattern  Unfortunately required multiple straight catheterizations  Finley catheter replaced 3/1/24 per urology  Flomax reduced to 0.4 mg daily due to hypotension   Trial of void 3/7/2024 - failed this  Finley re-inserted 3/8/24  Patient likely to require longer time prior to removal of Finley catheter -recommend removal in 2 weeks around 3/22/2024  UA was ordered by IM : equivocal - neg.   Culture was done by internal medicine.  Likely colonized.  Asymptomatic. No treatment after their discussion with ID.       Constipation due to neurogenic bowel  Assessment & Plan  Patient started on more aggressive bowel program yesterday  BM 3/14/24    Hearing loss  Assessment & Plan  Chronic hearing loss  Patient status post cochlear implant  MRI contraindicated    Hypercholesterolemia  Assessment & Plan  Continue simvastatin 20 mg daily (home dose)    UTI (urinary tract infection)-resolved as of  2/21/2024  Assessment & Plan  Suspected UTI based on UA and symptoms in acute care  Completed 3 days IV antibiotics        Appreciate IM consultants medical co-management.  Labs, medications, and imaging personally reviewed.      ROS:  A ten point review of systems was completed on 03/15/24 and pertinent positives are listed in subjective section. All other systems reviewed were negative.       OBJECTIVE:   /67 (BP Location: Left arm)   Pulse 70   Temp 98.5 °F (36.9 °C) (Temporal)   Resp 16   Ht 6' (1.829 m)   Wt 59 kg (130 lb 1.1 oz)   SpO2 94%   BMI 17.64 kg/m²     Physical Exam  Vitals and nursing note reviewed.   Constitutional:       General: He is not in acute distress.     Appearance: He is well-developed.   HENT:      Head: Normocephalic.      Ears:      Comments: Impaired hearing     Nose: Nose normal.   Eyes:      Conjunctiva/sclera: Conjunctivae normal.   Pulmonary:      Effort: Pulmonary effort is normal.      Breath sounds: No wheezing.   Abdominal:      General: There is no distension.      Palpations: Abdomen is soft.   Genitourinary:     Comments: +brooks   Musculoskeletal:         General: No swelling.      Cervical back: Neck supple.   Skin:     General: Skin is warm.   Neurological:      Mental Status: He is alert and oriented to person, place, and time.   Psychiatric:         Mood and Affect: Mood normal.          Lab Results   Component Value Date    WBC 5.64 03/11/2024    HGB 12.3 03/11/2024    HCT 39.0 03/11/2024    MCV 99 (H) 03/11/2024     03/11/2024     Lab Results   Component Value Date    SODIUM 141 03/11/2024    K 4.3 03/11/2024     03/11/2024    CO2 31 03/11/2024    BUN 15 03/11/2024    CREATININE 0.63 03/11/2024    GLUC 80 03/11/2024    CALCIUM 8.4 03/11/2024     Lab Results   Component Value Date    INR 1.02 06/02/2022    INR 1.10 05/29/2022    INR 1.04 05/28/2022    PROTIME 13.0 06/02/2022    PROTIME 13.7 05/29/2022    PROTIME 13.2 05/28/2022            Current Facility-Administered Medications:     acetaminophen (TYLENOL) tablet 650 mg, 650 mg, Oral, Q6H PRN, Daniel Clayton MD, 650 mg at 03/14/24 2038    bisacodyl (DULCOLAX) rectal suppository 10 mg, 10 mg, Rectal, Daily PRN, Darwin Paz MD    clotrimazole (MYCELEX) davy 10 mg, 10 mg, Oral, 5x Daily, Darwin Paz MD, 10 mg at 03/15/24 1312    cyanocobalamin (VITAMIN B-12) tablet 500 mcg, 500 mcg, Oral, Daily, Darwin Paz MD, 500 mcg at 03/15/24 0852    enoxaparin (LOVENOX) subcutaneous injection 40 mg, 40 mg, Subcutaneous, Q24H JEANNIE, Alexandrea Lugo MD, 40 mg at 03/15/24 0852    lactulose (CHRONULAC) oral solution 20 g, 20 g, Oral, BID PRN, Darwin Paz MD, 20 g at 03/05/24 2107    loratadine (CLARITIN) tablet 10 mg, 10 mg, Oral, Daily, Amada L Dagnall, PA-C, 10 mg at 03/15/24 0852    meclizine (ANTIVERT) tablet 25 mg, 25 mg, Oral, Q8H PRN, Amada L Dagnall, PA-C    melatonin tablet 6 mg, 6 mg, Oral, HS, Amada L Dagnall, PA-C, 6 mg at 03/14/24 2102    midodrine (PROAMATINE) tablet 2.5 mg, 2.5 mg, Oral, BID AC, Amada L Dagnall, PA-C, 2.5 mg at 03/15/24 0755    multivitamin-minerals (CENTRUM) tablet 1 tablet, 1 tablet, Oral, Daily, Amada L Dagnall, PA-C, 1 tablet at 03/15/24 0852    polyethylene glycol (MIRALAX) packet 17 g, 17 g, Oral, Daily PRN, Ashley Depadua, MD, 17 g at 03/05/24 1740    pravastatin (PRAVACHOL) tablet 40 mg, 40 mg, Oral, Daily With Dinner, Amada L Dagnall, PA-C, 40 mg at 03/14/24 1741    senna (SENOKOT) tablet 8.6 mg, 1 tablet, Oral, HS, Alexandrea Russ Lugo MD, 8.6 mg at 03/14/24 2102    sertraline (ZOLOFT) tablet 25 mg, 25 mg, Oral, Daily, Amada Epperson PA-C, 25 mg at 03/15/24 0852    tamsulosin (FLOMAX) capsule 0.4 mg, 0.4 mg, Oral, Daily With Dinner, Darwin Paz MD, 0.4 mg at 03/14/24 1741    Past Medical History:   Diagnosis Date    Arthritis     Encounter for general adult medical examination without abnormal findings 03/20/2019    Fall 11/03/2022     Hyperlipidemia     Macular degeneration, wet (HCC)     Urinary retention 06/02/2022       Patient Active Problem List    Diagnosis Date Noted    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    Behavior safety risk 02/20/2024    Hematuria 03/04/2024    Headache 02/20/2024    Bilateral lower extremity weakness 02/17/2024    Abnormal CT scan, lumbar spine 02/15/2024    Severe protein-calorie malnutrition (HCC) 02/14/2024    Debility 02/13/2024    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    Dysphagia 11/21/2023    Macular degeneration, age related 02/27/2023    Traumatic subdural hemorrhage with loss of consciousness of unspecified duration, initial encounter ([facility]) 02/27/2023    Sensorineural hearing loss (SNHL), bilateral 08/18/2022    Balance disorder 06/28/2022    Abnormal echocardiogram 06/27/2022    Right bundle-branch block 06/27/2022    Moderate protein-calorie malnutrition (HCC) 06/19/2022    Low BP 06/19/2022    Diastolic dysfunction 06/19/2022    EKG abnormalities 06/19/2022    Constipation due to neurogenic bowel 06/02/2022    Urinary retention 06/02/2022    SDH (subdural hematoma) ([facility]) 05/27/2022    Primary osteoarthritis of left knee 05/17/2022    Hygroma 04/26/2022    Right hand pain     Carpal tunnel syndrome on right     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      ** Please Note:  voice to text software may have been used in the creation of this document. Although proof errors in transcription or interpretation are a potential of such software**

## 2024-03-15 NOTE — NURSING NOTE
Patient out of bed with assist x 1 with walker. Needs cueing and direction for tasks. More vocal and able to communicate with staff. Denies pain. Sitting in recliner for all meals. Fluids encouraged. BP remains stable. Remains supervised feed. Able to feed self. No attempts to get up unassisted. Finley cath draining yellow urine. Finley cath care completed. Will continue to monitor and follow plan of care.

## 2024-03-16 ENCOUNTER — APPOINTMENT (OUTPATIENT)
Dept: CT IMAGING | Facility: HOSPITAL | Age: 84
End: 2024-03-16
Payer: MEDICARE

## 2024-03-16 LAB
BACTERIA UR CULT: ABNORMAL
BACTERIA UR CULT: ABNORMAL

## 2024-03-16 PROCEDURE — 97535 SELF CARE MNGMENT TRAINING: CPT

## 2024-03-16 PROCEDURE — 97116 GAIT TRAINING THERAPY: CPT | Performed by: PHYSICAL THERAPIST

## 2024-03-16 PROCEDURE — 70450 CT HEAD/BRAIN W/O DYE: CPT

## 2024-03-16 PROCEDURE — 97530 THERAPEUTIC ACTIVITIES: CPT

## 2024-03-16 PROCEDURE — 97110 THERAPEUTIC EXERCISES: CPT | Performed by: PHYSICAL THERAPIST

## 2024-03-16 RX ADMIN — Medication 6 MG: at 21:03

## 2024-03-16 RX ADMIN — CLOTRIMAZOLE 10 MG: 10 LOZENGE ORAL; TOPICAL at 07:31

## 2024-03-16 RX ADMIN — ENOXAPARIN SODIUM 40 MG: 40 INJECTION SUBCUTANEOUS at 08:16

## 2024-03-16 RX ADMIN — TAMSULOSIN HYDROCHLORIDE 0.4 MG: 0.4 CAPSULE ORAL at 17:17

## 2024-03-16 RX ADMIN — PRAVASTATIN SODIUM 40 MG: 40 TABLET ORAL at 17:17

## 2024-03-16 RX ADMIN — CLOTRIMAZOLE 10 MG: 10 LOZENGE ORAL; TOPICAL at 13:56

## 2024-03-16 RX ADMIN — CYANOCOBALAMIN TAB 500 MCG 500 MCG: 500 TAB at 08:17

## 2024-03-16 RX ADMIN — LORATADINE 10 MG: 10 TABLET ORAL at 08:17

## 2024-03-16 RX ADMIN — CLOTRIMAZOLE 10 MG: 10 LOZENGE ORAL; TOPICAL at 11:50

## 2024-03-16 RX ADMIN — CLOTRIMAZOLE 10 MG: 10 LOZENGE ORAL; TOPICAL at 21:03

## 2024-03-16 RX ADMIN — MIDODRINE HYDROCHLORIDE 2.5 MG: 2.5 TABLET ORAL at 07:31

## 2024-03-16 RX ADMIN — Medication 1 TABLET: at 08:17

## 2024-03-16 RX ADMIN — SENNOSIDES 8.6 MG: 8.6 TABLET, FILM COATED ORAL at 21:03

## 2024-03-16 RX ADMIN — SERTRALINE HYDROCHLORIDE 25 MG: 25 TABLET ORAL at 08:17

## 2024-03-16 RX ADMIN — CLOTRIMAZOLE 10 MG: 10 LOZENGE ORAL; TOPICAL at 17:18

## 2024-03-16 RX ADMIN — MIDODRINE HYDROCHLORIDE 2.5 MG: 2.5 TABLET ORAL at 17:17

## 2024-03-16 NOTE — NURSING NOTE
Pt was transferred  this a.m. to and from [facility] via transport with PCA assist  in satisfactory condition.  CT head was completed as ordered.

## 2024-03-16 NOTE — PROGRESS NOTES
03/16/24 1315   Pain Assessment   Pain Assessment Tool 0-10   Pain Score No Pain   Restrictions/Precautions   Precautions Aspiration;Aphasia;Bed/chair alarms;Cognitive;Fall Risk;Brooks;Hard of hearing;Supervision on toilet/commode   Cognition   Overall Cognitive Status Impaired   Subjective   Subjective Pt indicates being tired. Wife reports tired from trip for CT scan.   Roll Left and Right   Type of Assistance Needed Supervision   Physical Assistance Level No physical assistance   Roll Left and Right CARE Score 4   Sit to Lying   Type of Assistance Needed Supervision   Physical Assistance Level No physical assistance   Sit to Lying CARE Score 4   Sit to Stand   Type of Assistance Needed Supervision   Physical Assistance Level No physical assistance   Comment Cl S, RW, cues for hand placement   Sit to Stand CARE Score 4   Bed-Chair Transfer   Type of Assistance Needed Supervision   Physical Assistance Level No physical assistance   Comment Cues for safety, RW, CGA   Chair/Bed-to-Chair Transfer CARE Score 4   Walk 10 Feet   Type of Assistance Needed Incidental touching   Physical Assistance Level 25% or less   Comment CGA/min A, RW, assist to manage brooks cath   Walk 10 Feet CARE Score 3   Walk 50 Feet with Two Turns   Type of Assistance Needed Incidental touching   Physical Assistance Level 25% or less   Comment CGA/min A, RW   Walk 50 Feet with Two Turns CARE Score 3   Walk 150 Feet   Type of Assistance Needed Incidental touching   Physical Assistance Level 25% or less   Comment .CGA/min A, RW, 150 ft x 2   Walk 150 Feet CARE Score 3   Ambulation   Primary Mode of Locomotion Prior to Admission Walk   Assist Device Roller Walker   Does the patient walk? 2. Yes   Equipment Use   NuStep L1, 10 mins (5 mins x 2 with rest between sets)   Assessment   Treatment Assessment Patient tolerated session fairly, evidently fatigued this afternoon.  Increased cues for safety required. Overall continues to make slow progress,  max cues for safety and technique continue.  Cont per POC and progress as tolerated.   Problem List Decreased strength;Decreased range of motion;Decreased endurance;Impaired balance;Decreased coordination;Decreased cognition;Decreased safety awareness;Impaired hearing   PT Barriers   Physical Impairment Decreased strength;Decreased range of motion;Decreased endurance;Impaired balance;Decreased mobility;Decreased coordination;Impaired judgement;Decreased safety awareness;Impaired hearing   Functional Limitation Car transfers;Ramp negotiation;Stair negotiation;Standing;Transfers;Walking;Wheelchair management   Plan   Treatment/Interventions Functional transfer training;LE strengthening/ROM;Elevations;Therapeutic exercise;Endurance training;Cognitive reorientation;Patient/family training;Equipment eval/education;Bed mobility;Gait training   Progress Progressing toward goals   PT Therapy Minutes   PT Time In 1315   PT Time Out 1344   PT Total Time (minutes) 29   PT Mode of treatment - Individual (minutes) 29   PT Mode of treatment - Concurrent (minutes) 0   PT Mode of treatment - Group (minutes) 0   PT Mode of treatment - Co-treat (minutes) 0   PT Mode of Treatment - Total time(minutes) 29 minutes   PT Cumulative Minutes 1762     Patient returned to bed, all needs in reach.  Alarm in place and activated.  Encouraged use of call bell, patient's wife present and verbalizes understanding.

## 2024-03-16 NOTE — NURSING NOTE
Tolerated therapy today.  Wife visiting at this time.  Finley maintained for urinary retention.  Finley care done by OT with ADLs this a.m.  Pt can occ say 2 words at a time , slurred speech.   Aphasia persists.   Safety precautions maintained.  In no distress.

## 2024-03-16 NOTE — PROGRESS NOTES
Progress Note - Thomas Chase 83 y.o. male MRN: [id number]    Unit/Bed#: [facility] 213-02 Encounter: [id number]        Subjective:   Patient seen and examined at bedside after reviewing the chart and discussing the case with the caring staff.      Patient examined at bedside.  Patient has no acute symptoms.    Physical Exam   Vitals: Blood pressure 107/58, pulse 98, temperature (!) 97.4 °F (36.3 °C), temperature source Temporal, resp. rate 17, height 6' (1.829 m), weight 59 kg (130 lb 1.1 oz), SpO2 95%.,Body mass index is 17.64 kg/m².  Constitutional: Patient in no acute distress.  HEENT: PERR, EOMI, MMM.  Cardiovascular: Normal rate and regular rhythm.    Pulmonary/Chest: Effort normal and breath sounds normal.   Abdomen: Soft, + BS, NT.    Assessment/Plan:  Thomas Chase is a(n) 83 y.o. year old male with left SDH and right SAH.     1.  Cardiac with hx of HTN, HLD/hypotension.  On pravastatin 40 mg daily (simvastatin nonformulary).  Noted to have low BP 3/10/24.   Patient's orthostatic vitals did not show significant drop.  Received IV fluids and started on midodrine 2.5 mg twice daily 3/12/24.  BP now improved, continue to monitor.  UA was ordered to rule out UTI as patient's CRP and sed rate were slightly elevated.  Clinton texted on call ID regarding urine culture who recommended no treatment.  Patient asymptomatic, does not have any urinary symptoms, flank pain.    2.  Allergic rhinitis.  Patient is on loratadine 10 mg daily.  3.  Depression/anxiety.  Patient is on Zoloft 25 mg daily.  4.  Insomnia.  Patient is on melatonin 6 mg at bedtime.  5.  Thrush.  Discontinue nystatin liquid swish and swallow 3/11/2024 and started on clotrimazole lozenges 3/11/2024.  If patient is unable to do this will restart nystatin liquid swish and swallow.  6.  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  Patient is mainly nonverbal.  7.  Urinary retention.  Urinary retention protocol.  Flomax decreased to 0.4 mg daily  3/12/2024 due to possible cause of hypotension.  Finley catheter placed 2/22/24, removed on 2/28/24, placed again 3/1/24, removed again 3/7/24 and reinserted 3/8/24.    8.  Dysphagia.  Speech therapy following.  Dysphagia 1 puureed with nectar thick liquid.  Video swallow study done on 3/11/2024.  9.  Vitamin B12 deficiency.  Patient started on vitamin B12 500 mcg daily.    10.  Pain and bowel management.  As per physiatrist.  11.  Intracranial hemorrhage.   Patient will be receiving intensive PT OT ST as per physiatrist.    Anticipated discharge date:  TBD most likely to SNF.  PCP:  RONY Diaz

## 2024-03-16 NOTE — PROGRESS NOTES
03/16/24 0700   Pain Assessment   Pain Assessment Tool 0-10   Pain Score No Pain   Restrictions/Precautions   Precautions Aspiration;Bed/chair alarms;Cognitive;Fall Risk;[place];Hard of hearing   Weight Bearing Restrictions No   ROM Restrictions No   Eating   Type of Assistance Needed Supervision   Physical Assistance Level No physical assistance   Comment pt self fed desired parts of breakfast meal which was approx 50%, increased coughing noted overall with >50% of bites, visual cues to double-swallow when coughing; pt with decreased appetite this session presumably as a result of increased coughing   Eating CARE Score 4   Eating Assessment   Food To Mouth Yes   Positioning Upright;Out of Bed   Safety Needs Increase Time;Cues;Freq Swallow   Meal Assessed Breakfast   Intake Mode PO   Finishes Timely No   Opens Packages No   Oral Hygiene   Type of Assistance Needed Supervision   Physical Assistance Level No physical assistance   Comment cues to initiate rinsing mouth, assist to squeeze toothpaste onto brush   Oral Hygiene CARE Score 4   Grooming   Able To Comb/Brush Hair;Wash/Dry Face;Brush/Clean Teeth   Limitation Noted In Problem Solving;Safety;Sequencing   Shower/Bathe Self   Type of Assistance Needed Physical assistance   Physical Assistance Level 26%-50%   Comment assist for buttocks, bilat lower LE; Finley care completed by OT   Shower/Bathe Self CARE Score 3   Bathing   Assessed Bath Style Sponge Bath   Anticipated D/C Bath Style Sponge Bath;Shower   Able to Gather/Transport No   Able to Wash/Rinse/Dry (body part) Left Arm;Right Arm;L Upper Leg;R Upper Leg;Chest;Abdomen   Limitations Noted in Balance;Endurance;Problem Solving;ROM;Safety;Sequencing   Positioning Seated;Standing   Upper Body Dressing   Type of Assistance Needed Physical assistance   Physical Assistance Level 26%-50%   Comment assist to doff/don flannel shirt over bilat UE, pull new t-shirt over head   Upper Body Dressing CARE Score 3   Lower  Body Dressing   Type of Assistance Needed Physical assistance   Physical Assistance Level 76% or more   Comment able to pull pants from mid-shin to knees when seated and over buttocks/hips when standing   Lower Body Dressing CARE Score 2   Putting On/Taking Off Footwear   Type of Assistance Needed Physical assistance   Physical Assistance Level 76% or more   Comment able to present bilat feet to OT to doff/don footwear   Putting On/Taking Off Footwear CARE Score 2   Dressing/Undressing Clothing   Remove UB Clothes Pullover Shirt;Jacket   Don UB Clothes Pullover Shirt;Jacket   Remove LB Clothes Undergarment;Socks   Don LB Clothes Pants;Undergarment;Socks;TEDs   Limitations Noted In Balance;Endurance;Problem Solving;Safety;Sequencing;Strength;ROM   Positioning Supported Sit   Lying to Sitting on Side of Bed   Type of Assistance Needed Supervision   Physical Assistance Level No physical assistance   Lying to Sitting on Side of Bed CARE Score 4   Sit to Stand   Type of Assistance Needed Supervision   Physical Assistance Level No physical assistance   Sit to Stand CARE Score 4   Bed-Chair Transfer   Type of Assistance Needed Supervision   Physical Assistance Level No physical assistance   Chair/Bed-to-Chair Transfer CARE Score 4   Transfer Bed/Chair/Wheelchair   Limitations Noted In Balance;Endurance;Problem Solving;Sequencing;LE Strength   Adaptive Equipment Roller Walker   Exercise Tools   Other Exercise Tool 1 card match while seated, completed with 100% accuracy with the exception of one error that was easily corrected with visual cue, good sustained attention and working memory noted; increased time to complete   Cognition   Overall Cognitive Status Impaired   Arousal/Participation Alert;Responsive;Cooperative   Attention Attends with cues to redirect   Orientation Level Oriented to person;Oriented to place   Memory Decreased short term memory;Decreased recall of recent events;Decreased recall of precautions    Following Commands Follows one step commands with increased time or repetition   Assessment   Treatment Assessment Pt agreeable to OT session this AM. Received lying supine in bed. ADL session completed; current LOF and details listed in respective sections. Pt is overall maxA LB dressing, Brayan bathing and UB dressing, supervision grooming; fxl transfers overall supervision with visual cues for hand placement on RW. Card matching activity completed after ADL with good tolerance and cognition/attention during activity. Pt with increased coughing during breakfast tray completion and required increased visual cues to complete compensatory strategies. Pt continues to verbalize more to OT both prompted and unprompted; whiteboard used to communicate effectively. Pt's barriers to d/c include decreased strength throughout, decreased balance, impaired hearing, impaired cognition including safety awareness, problem solving, attention, comprehension, and expression, and decreased activity tolerance; all affect independence in self care and fxl transfers. Pt would benefit from continued skilled OT services in order to address listed barriers and prepare for safe d/c.   Prognosis Fair   Problem List Decreased strength;Decreased range of motion;Decreased endurance;Impaired balance;Decreased coordination;Decreased cognition;Decreased safety awareness;Impaired hearing   Plan   Treatment/Interventions ADL retraining;LE strengthening/ROM;Functional transfer training;Therapeutic exercise;Endurance training;Cognitive reorientation;Patient/family training;Equipment eval/education;Compensatory technique education;OT   Progress Progressing toward goals   OT Therapy Minutes   OT Time In 0700   OT Time Out 0830   OT Total Time (minutes) 90   OT Mode of treatment - Individual (minutes) 90

## 2024-03-17 LAB — SARS-COV-2 RNA RESP QL NAA+PROBE: NEGATIVE

## 2024-03-17 PROCEDURE — 97110 THERAPEUTIC EXERCISES: CPT

## 2024-03-17 PROCEDURE — 87635 SARS-COV-2 COVID-19 AMP PRB: CPT | Performed by: PHYSICAL MEDICINE & REHABILITATION

## 2024-03-17 RX ORDER — LEVOFLOXACIN 250 MG/1
500 TABLET, FILM COATED ORAL EVERY 24 HOURS
Status: COMPLETED | OUTPATIENT
Start: 2024-03-17 | End: 2024-03-21

## 2024-03-17 RX ADMIN — Medication 1 TABLET: at 08:43

## 2024-03-17 RX ADMIN — ACETAMINOPHEN 650 MG: 325 TABLET ORAL at 17:25

## 2024-03-17 RX ADMIN — SERTRALINE HYDROCHLORIDE 25 MG: 25 TABLET ORAL at 08:43

## 2024-03-17 RX ADMIN — MIDODRINE HYDROCHLORIDE 2.5 MG: 2.5 TABLET ORAL at 16:15

## 2024-03-17 RX ADMIN — MIDODRINE HYDROCHLORIDE 2.5 MG: 2.5 TABLET ORAL at 07:39

## 2024-03-17 RX ADMIN — CLOTRIMAZOLE 10 MG: 10 LOZENGE ORAL; TOPICAL at 11:06

## 2024-03-17 RX ADMIN — CYANOCOBALAMIN TAB 500 MCG 500 MCG: 500 TAB at 08:43

## 2024-03-17 RX ADMIN — CLOTRIMAZOLE 10 MG: 10 LOZENGE ORAL; TOPICAL at 13:24

## 2024-03-17 RX ADMIN — LEVOFLOXACIN 500 MG: 250 TABLET, FILM COATED ORAL at 16:16

## 2024-03-17 RX ADMIN — Medication 6 MG: at 21:27

## 2024-03-17 RX ADMIN — CLOTRIMAZOLE 10 MG: 10 LOZENGE ORAL; TOPICAL at 21:27

## 2024-03-17 RX ADMIN — LORATADINE 10 MG: 10 TABLET ORAL at 08:43

## 2024-03-17 RX ADMIN — SENNOSIDES 8.6 MG: 8.6 TABLET, FILM COATED ORAL at 21:27

## 2024-03-17 RX ADMIN — CLOTRIMAZOLE 10 MG: 10 LOZENGE ORAL; TOPICAL at 17:24

## 2024-03-17 RX ADMIN — TAMSULOSIN HYDROCHLORIDE 0.4 MG: 0.4 CAPSULE ORAL at 17:24

## 2024-03-17 RX ADMIN — ENOXAPARIN SODIUM 40 MG: 40 INJECTION SUBCUTANEOUS at 08:39

## 2024-03-17 RX ADMIN — PRAVASTATIN SODIUM 40 MG: 40 TABLET ORAL at 17:24

## 2024-03-17 NOTE — PROGRESS NOTES
OT Daily Progress       03/17/24 1045   Pain Assessment   Pain Assessment Tool 0-10   Pain Score No Pain   Communication   Communication   (via white board)   Therapeutic Excerise-Strength   UE Strength Yes   Cognition   Overall Cognitive Status Impaired   Arousal/Participation Responsive;Persistent stimuli required   Attention Attends with cues to redirect   Following Commands Follows one step commands inconsistently   Assessment   Treatment Assessment Pt seen for brief follow up visit. Pt initially in bed and asleep. Awakens with tactile cueing. Communication via white board and visual repitition. Completes UE therapeutic ex with 3# weighted dowel for bicep curls, chest press, shoulder press x 20 reps each. Some tactile cueing required for form of exercises and correct hold on bar. 5# gripper x 30 reps bilaterally, 2 sets each. Spouse present and end of session with needs in reach. Mamie fair. Increased particiaption with tactile cueing. Rest breaks between sets. Continue to progress treatment as status allows.   OT Therapy Minutes   OT Time In 1045   OT Time Out 1115   OT Total Time (minutes) 30   OT Mode of treatment - Individual (minutes) 30

## 2024-03-17 NOTE — NURSING NOTE
Dr. Paz made aware of patients culture results. New order noted for levaquin to start. Patient denies any symptoms of discomfort accept during brooks care. Will continue to monitor.

## 2024-03-17 NOTE — PROGRESS NOTES
Progress Note - Thomas Chase 83 y.o. male MRN: [id number]    Unit/Bed#: [facility] 213-02 Encounter: [id number]        Subjective:   Patient seen and examined at bedside after reviewing the chart and discussing the case with the caring staff.      Patient examined at bedside.  Patient has no acute symptoms.  Patient's urine culture and sensitivity results showed significant growth with enterococci sensitive to Levaquin.    Physical Exam   Vitals: Blood pressure 123/70, pulse 91, temperature 97.9 °F (36.6 °C), temperature source Temporal, resp. rate 18, height 6' (1.829 m), weight 59 kg (130 lb 1.1 oz), SpO2 92%.,Body mass index is 17.64 kg/m².  Constitutional: Patient in no acute distress.  HEENT: PERR, EOMI, MMM.  Cardiovascular: Normal rate and regular rhythm.    Pulmonary/Chest: Effort normal and breath sounds normal.   Abdomen: Soft, + BS, NT.    Assessment/Plan:  Thomas Chase is a(n) 83 y.o. year old male with left SDH and right SAH.     1.  Cardiac with hx of HTN, HLD/hypotension.  On pravastatin 40 mg daily (simvastatin nonformulary).  Noted to have low BP 3/10/24.   Patient's orthostatic vitals did not show significant drop.  Received IV fluids and started on midodrine 2.5 mg twice daily 3/12/24.  BP now improved, continue to monitor.  UA was ordered to rule out UTI as patient's CRP and sed rate were slightly elevated.  [place] texted on call ID regarding urine culture who recommended no treatment.  Patient asymptomatic, does not have any urinary symptoms, flank pain.    2.  Allergic rhinitis.  Patient is on loratadine 10 mg daily.  3.  Depression/anxiety.  Patient is on Zoloft 25 mg daily.  4.  Insomnia.  Patient is on melatonin 6 mg at bedtime.  5.  Thrush.  Discontinue nystatin liquid swish and swallow 3/11/2024 and started on clotrimazole lozenges 3/11/2024.  If patient is unable to do this will restart nystatin liquid swish and swallow.  6.  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   Patient is mainly nonverbal.  7.  Urinary retention/enterococcal UTI.  Urinary retention protocol.  Flomax decreased to 0.4 mg daily 3/12/2024 due to possible cause of hypotension.  Finley catheter placed 2/22/24, removed on 2/28/24, placed again 3/1/24, removed again 3/7/24 and reinserted 3/8/24.  I will treat the patient with Levaquin 500 mg daily 3/17/2024 for 5 days.  8.  Dysphagia.  Speech therapy following.  Dysphagia 1 puureed with nectar thick liquid.  Video swallow study done on 3/11/2024.  9.  Vitamin B12 deficiency.  Patient started on vitamin B12 500 mcg daily.    10.  Pain and bowel management.  As per physiatrist.  11.  Intracranial hemorrhage.   Patient will be receiving intensive PT OT ST as per physiatrist.    Anticipated discharge date:  TBD most likely to SNF.  PCP:  RONY Diaz

## 2024-03-17 NOTE — PLAN OF CARE
Problem: PAIN - ADULT  Goal: Verbalizes/displays adequate comfort level or baseline comfort level  Description: Interventions:  - Encourage patient to monitor pain and request assistance  - Assess pain using appropriate pain scale  - Administer analgesics based on type and severity of pain and evaluate response  - Implement non-pharmacological measures as appropriate and evaluate response  - Consider cultural and social influences on pain and pain management  - Notify physician/advanced practitioner if interventions unsuccessful or patient reports new pain  Outcome: Progressing     Problem: INFECTION - ADULT  Goal: Absence or prevention of progression during hospitalization  Description: INTERVENTIONS:  - Assess and monitor for signs and symptoms of infection  - Monitor lab/diagnostic results  - Monitor all insertion sites, i.e. indwelling lines, tubes, and drains  - Monitor endotracheal if appropriate and nasal secretions for changes in amount and color  - [place] appropriate cooling/warming therapies per order  - Administer medications as ordered  - Instruct and encourage patient and family to use good hand hygiene technique  - Identify and instruct in appropriate isolation precautions for identified infection/condition  Outcome: Progressing  Goal: Absence of fever/infection during neutropenic period  Description: INTERVENTIONS:  - Monitor WBC    Outcome: Progressing     Problem: DISCHARGE PLANNING  Goal: Discharge to home or other facility with appropriate resources  Description: INTERVENTIONS:  - Identify barriers to discharge w/patient and caregiver  - Arrange for needed discharge resources and transportation as appropriate  - Identify discharge learning needs (meds, wound care, etc.)  - Arrange for interpretive services to assist at discharge as needed  - Refer to Case Management Department for coordinating discharge planning if the patient needs post-hospital services based on physician/advanced  practitioner order or complex needs related to functional status, cognitive ability, or social support system  Outcome: Progressing

## 2024-03-17 NOTE — NURSING NOTE
Patient out of bed and ambulates with assist x 1 with walker. Needs cueing and direction with tasks. More vocal with staff. Did communicate that he had back pain when sitting in recliner and medicated with tylenol for same. Remains supervised feed. Moist cough noted at times. Patient got up one time unassisted in AM without ringing. Nurse entered room and patient was already in recliner chair with boroks on other side of bed. Directed patient to ring always and never get up on his own. Brooks catheter remains intact draining yellow urine. Brooks cath care completed. Patient started levaquin as ordered.  Wife at bedside throughout shift. Will continue to monitor and follow plan of care.

## 2024-03-18 LAB
ALBUMIN SERPL BCP-MCNC: 3.2 G/DL (ref 3.5–5)
ALP SERPL-CCNC: 109 U/L (ref 34–104)
ALT SERPL W P-5'-P-CCNC: 37 U/L (ref 7–52)
ANION GAP SERPL CALCULATED.3IONS-SCNC: 8 MMOL/L (ref 4–13)
AST SERPL W P-5'-P-CCNC: 24 U/L (ref 13–39)
BASOPHILS # BLD AUTO: 0.03 THOUSANDS/ÂΜL (ref 0–0.1)
BASOPHILS NFR BLD AUTO: 1 % (ref 0–1)
BILIRUB SERPL-MCNC: 0.29 MG/DL (ref 0.2–1)
BUN SERPL-MCNC: 15 MG/DL (ref 5–25)
CALCIUM ALBUM COR SERPL-MCNC: 9.1 MG/DL (ref 8.3–10.1)
CALCIUM SERPL-MCNC: 8.5 MG/DL (ref 8.4–10.2)
CHLORIDE SERPL-SCNC: 101 MMOL/L (ref 96–108)
CO2 SERPL-SCNC: 28 MMOL/L (ref 21–32)
CREAT SERPL-MCNC: 0.56 MG/DL (ref 0.6–1.3)
EOSINOPHIL # BLD AUTO: 0.19 THOUSAND/ÂΜL (ref 0–0.61)
EOSINOPHIL NFR BLD AUTO: 3 % (ref 0–6)
ERYTHROCYTE [DISTWIDTH] IN BLOOD BY AUTOMATED COUNT: 14.2 % (ref 11.6–15.1)
GFR SERPL CREATININE-BSD FRML MDRD: 94 ML/MIN/1.73SQ M
GLUCOSE P FAST SERPL-MCNC: 82 MG/DL (ref 65–99)
GLUCOSE SERPL-MCNC: 82 MG/DL (ref 65–140)
HCT VFR BLD AUTO: 37.3 % (ref 36.5–49.3)
HGB BLD-MCNC: 11.9 G/DL (ref 12–17)
IMM GRANULOCYTES # BLD AUTO: 0.02 THOUSAND/UL (ref 0–0.2)
IMM GRANULOCYTES NFR BLD AUTO: 0 % (ref 0–2)
LYMPHOCYTES # BLD AUTO: 1 THOUSANDS/ÂΜL (ref 0.6–4.47)
LYMPHOCYTES NFR BLD AUTO: 16 % (ref 14–44)
MCH RBC QN AUTO: 31.3 PG (ref 26.8–34.3)
MCHC RBC AUTO-ENTMCNC: 31.9 G/DL (ref 31.4–37.4)
MCV RBC AUTO: 98 FL (ref 82–98)
MONOCYTES # BLD AUTO: 0.43 THOUSAND/ÂΜL (ref 0.17–1.22)
MONOCYTES NFR BLD AUTO: 7 % (ref 4–12)
NEUTROPHILS # BLD AUTO: 4.6 THOUSANDS/ÂΜL (ref 1.85–7.62)
NEUTS SEG NFR BLD AUTO: 73 % (ref 43–75)
NRBC BLD AUTO-RTO: 0 /100 WBCS
PLATELET # BLD AUTO: 350 THOUSANDS/UL (ref 149–390)
PMV BLD AUTO: 9.5 FL (ref 8.9–12.7)
POTASSIUM SERPL-SCNC: 4.3 MMOL/L (ref 3.5–5.3)
PROT SERPL-MCNC: 6.2 G/DL (ref 6.4–8.4)
RBC # BLD AUTO: 3.8 MILLION/UL (ref 3.88–5.62)
SODIUM SERPL-SCNC: 137 MMOL/L (ref 135–147)
WBC # BLD AUTO: 6.27 THOUSAND/UL (ref 4.31–10.16)

## 2024-03-18 PROCEDURE — 97110 THERAPEUTIC EXERCISES: CPT

## 2024-03-18 PROCEDURE — 85025 COMPLETE CBC W/AUTO DIFF WBC: CPT

## 2024-03-18 PROCEDURE — 99232 SBSQ HOSP IP/OBS MODERATE 35: CPT | Performed by: PHYSICAL MEDICINE & REHABILITATION

## 2024-03-18 PROCEDURE — 97530 THERAPEUTIC ACTIVITIES: CPT

## 2024-03-18 PROCEDURE — 92526 ORAL FUNCTION THERAPY: CPT | Performed by: NURSE PRACTITIONER

## 2024-03-18 PROCEDURE — 97116 GAIT TRAINING THERAPY: CPT

## 2024-03-18 PROCEDURE — 97537 COMMUNITY/WORK REINTEGRATION: CPT

## 2024-03-18 PROCEDURE — 97535 SELF CARE MNGMENT TRAINING: CPT

## 2024-03-18 PROCEDURE — 80053 COMPREHEN METABOLIC PANEL: CPT

## 2024-03-18 RX ORDER — LANOLIN ALCOHOL/MO/W.PET/CERES
6 CREAM (GRAM) TOPICAL
Start: 2024-03-18 | End: 2024-04-05

## 2024-03-18 RX ORDER — BISACODYL 10 MG
10 SUPPOSITORY, RECTAL RECTAL DAILY PRN
Start: 2024-03-18 | End: 2024-04-05

## 2024-03-18 RX ORDER — TAMSULOSIN HYDROCHLORIDE 0.4 MG/1
0.4 CAPSULE ORAL
Start: 2024-03-18 | End: 2024-04-05

## 2024-03-18 RX ORDER — ACETAMINOPHEN 325 MG/1
650 TABLET ORAL EVERY 6 HOURS PRN
Start: 2024-03-18 | End: 2024-04-05

## 2024-03-18 RX ORDER — SERTRALINE HYDROCHLORIDE 25 MG/1
25 TABLET, FILM COATED ORAL DAILY
Start: 2024-03-18 | End: 2024-04-05

## 2024-03-18 RX ORDER — POLYETHYLENE GLYCOL 3350 17 G/17G
17 POWDER, FOR SOLUTION ORAL DAILY PRN
Start: 2024-03-18 | End: 2024-04-05

## 2024-03-18 RX ORDER — SENNOSIDES 8.6 MG
8.6 TABLET ORAL
Start: 2024-03-18 | End: 2024-04-05

## 2024-03-18 RX ORDER — MIDODRINE HYDROCHLORIDE 2.5 MG/1
2.5 TABLET ORAL
Status: DISCONTINUED | OUTPATIENT
Start: 2024-03-18 | End: 2024-03-21

## 2024-03-18 RX ORDER — MECLIZINE HYDROCHLORIDE 25 MG/1
25 TABLET ORAL EVERY 8 HOURS PRN
Start: 2024-03-18 | End: 2024-04-05

## 2024-03-18 RX ORDER — CLOTRIMAZOLE 10 MG/1
10 LOZENGE ORAL; TOPICAL
Start: 2024-03-18 | End: 2024-04-05

## 2024-03-18 RX ORDER — SIMVASTATIN 20 MG
20 TABLET ORAL
Start: 2024-03-18 | End: 2024-04-05

## 2024-03-18 RX ORDER — LEVOFLOXACIN 500 MG/1
500 TABLET, FILM COATED ORAL EVERY 24 HOURS
Start: 2024-03-18 | End: 2024-04-05

## 2024-03-18 RX ORDER — MIDODRINE HYDROCHLORIDE 2.5 MG/1
2.5 TABLET ORAL
Start: 2024-03-18 | End: 2024-04-05

## 2024-03-18 RX ORDER — LACTULOSE 10 G/15ML
20 SOLUTION ORAL 2 TIMES DAILY PRN
Start: 2024-03-18 | End: 2024-03-18

## 2024-03-18 RX ORDER — LACTULOSE 10 G/15ML
20 SOLUTION ORAL 2 TIMES DAILY PRN
Start: 2024-03-18 | End: 2024-04-05

## 2024-03-18 RX ORDER — LORATADINE 10 MG/1
10 TABLET ORAL DAILY
Start: 2024-03-18 | End: 2024-04-05

## 2024-03-18 RX ORDER — MULTIVITAMIN
1 TABLET ORAL DAILY
Start: 2024-03-18 | End: 2024-04-05

## 2024-03-18 RX ADMIN — LEVOFLOXACIN 500 MG: 250 TABLET, FILM COATED ORAL at 14:35

## 2024-03-18 RX ADMIN — CYANOCOBALAMIN TAB 500 MCG 500 MCG: 500 TAB at 08:46

## 2024-03-18 RX ADMIN — CLOTRIMAZOLE 10 MG: 10 LOZENGE ORAL; TOPICAL at 17:28

## 2024-03-18 RX ADMIN — Medication 6 MG: at 21:50

## 2024-03-18 RX ADMIN — CLOTRIMAZOLE 10 MG: 10 LOZENGE ORAL; TOPICAL at 14:35

## 2024-03-18 RX ADMIN — Medication 1 TABLET: at 08:46

## 2024-03-18 RX ADMIN — CLOTRIMAZOLE 10 MG: 10 LOZENGE ORAL; TOPICAL at 12:00

## 2024-03-18 RX ADMIN — SERTRALINE HYDROCHLORIDE 25 MG: 25 TABLET ORAL at 08:46

## 2024-03-18 RX ADMIN — MIDODRINE HYDROCHLORIDE 2.5 MG: 2.5 TABLET ORAL at 12:00

## 2024-03-18 RX ADMIN — CLOTRIMAZOLE 10 MG: 10 LOZENGE ORAL; TOPICAL at 08:47

## 2024-03-18 RX ADMIN — ENOXAPARIN SODIUM 40 MG: 40 INJECTION SUBCUTANEOUS at 08:46

## 2024-03-18 RX ADMIN — CLOTRIMAZOLE 10 MG: 10 LOZENGE ORAL; TOPICAL at 21:49

## 2024-03-18 RX ADMIN — TAMSULOSIN HYDROCHLORIDE 0.4 MG: 0.4 CAPSULE ORAL at 17:28

## 2024-03-18 RX ADMIN — MIDODRINE HYDROCHLORIDE 2.5 MG: 2.5 TABLET ORAL at 08:46

## 2024-03-18 RX ADMIN — SENNOSIDES 8.6 MG: 8.6 TABLET, FILM COATED ORAL at 21:50

## 2024-03-18 RX ADMIN — ACETAMINOPHEN 650 MG: 325 TABLET ORAL at 23:26

## 2024-03-18 RX ADMIN — MIDODRINE HYDROCHLORIDE 2.5 MG: 2.5 TABLET ORAL at 17:28

## 2024-03-18 RX ADMIN — ACETAMINOPHEN 650 MG: 325 TABLET ORAL at 13:20

## 2024-03-18 RX ADMIN — PRAVASTATIN SODIUM 40 MG: 40 TABLET ORAL at 17:28

## 2024-03-18 RX ADMIN — LORATADINE 10 MG: 10 TABLET ORAL at 08:46

## 2024-03-18 NOTE — PROGRESS NOTES
Progress Note - Thomas Chase 83 y.o. male MRN: [id number]    Unit/Bed#: -02 Encounter: [id number]        Subjective:   Patient seen and examined at bedside after reviewing the chart and discussing the case with the caring staff.      Patient examined at bedside.  Patient has no acute symptoms.  Patient's discharged today to [facility] was postponed as they do not have any bed available.  Patient's blood pressure this morning was noticed to be low 84/56 before the patient received the midodrine.  Patient was asymptomatic.    Physical Exam   Vitals: Blood pressure (!) 84/56, pulse (!) 106, temperature (!) 97.4 °F (36.3 °C), temperature source Temporal, resp. rate 16, height 6' (1.829 m), weight 60.4 kg (133 lb 2.5 oz), SpO2 93%.,Body mass index is 18.06 kg/m².  Constitutional: Patient in no acute distress.  HEENT: PERR, EOMI, MMM.  Cardiovascular: Normal rate and regular rhythm.    Pulmonary/Chest: Effort normal and breath sounds normal.   Abdomen: Soft, + BS, NT.    Assessment/Plan:  Thomas Chase is a(n) 83 y.o. year old male with left SDH and right SAH.     1.  Cardiac with hx of HTN, HLD/hypotension.  On pravastatin 40 mg daily (simvastatin nonformulary).  Noted to have low BP 3/10/24.   Patient's orthostatic vitals did not show significant drop.  Received IV fluids and started on midodrine 2.5 mg 3 times daily 3/18/2024.  BP now improved, continue to monitor.  UA was ordered to rule out UTI as patient's CRP and sed rate were slightly elevated.  [place] texted on call ID regarding urine culture who recommended no treatment.  Patient asymptomatic, does not have any urinary symptoms, flank pain.    2.  Allergic rhinitis.  Patient is on loratadine 10 mg daily.  3.  Depression/anxiety.  Patient is on Zoloft 25 mg daily.  4.  Insomnia.  Patient is on melatonin 6 mg at bedtime.  5.  Thrush.  Discontinue nystatin liquid swish and swallow 3/11/2024 and started on clotrimazole lozenges 3/11/2024.  If patient is  unable to do this will restart nystatin liquid swish and swallow.  6.  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  Patient is mainly nonverbal.  7.  Urinary retention/enterococcal UTI.  Urinary retention protocol.  Flomax decreased to 0.4 mg daily 3/12/2024 due to possible cause of hypotension.  Finley catheter placed 2/22/24, removed on 2/28/24, placed again 3/1/24, removed again 3/7/24 and reinserted 3/8/24.  I will treat the patient with Levaquin 500 mg daily 3/17/2024 for 5 days.  8.  Dysphagia.  Speech therapy following.  Dysphagia 1 puureed with nectar thick liquid.  Video swallow study done on 3/11/2024.  9.  Vitamin B12 deficiency.  Patient started on vitamin B12 500 mcg daily.    10.  Pain and bowel management.  As per physiatrist.  11.  Intracranial hemorrhage.   Patient will be receiving intensive PT OT ST as per physiatrist.    Anticipated discharge date:  TBD most likely to SNF.  PCP:  RONY Diaz

## 2024-03-18 NOTE — NURSING NOTE
Pt cath care done. Finley draining clear yellow urine. PT in bed with call bell in reach. Respirations even and unlabored. Plan of care ongoing.

## 2024-03-18 NOTE — CASE MANAGEMENT
CALVIN called Adelita in admissions at [facility], inquired if they can accept patient. Adelita stated there is a meeting this afternoon with the managers to discuss if they can meet the patient's needs. Adelita stated she will notify me of the result of this meeting, CM continues to follow.

## 2024-03-18 NOTE — PROGRESS NOTES
PM&R PROGRESS NOTE:  Thomas Chase 83 y.o. male MRN: [id number]  Unit/Bed#: -02 Encounter: [id number]        **CHANGE IN REHAB DIAGNOSIS FROM PRE-ADMISSION SCREEN  Rehab Diagnosis: Impairment of mobility, safety, Activities of Daily Living (ADLs), and cognitive/communication skills due to Brain Dysfunction:  02.22  Traumatic, Closed Injury  Etiologic Diagnosis: L SDH, R SAH, L anterior hypodensity lateral temporal lobe    HPI: Thomas Chase is a 83 y.o. 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  Unclear if patient had head trauma.  CT head showing a 3 mm left frontal subdural hemorrhage.  CT lumbar spine showing DJD.  Patient found to have dysphagia and was seen by speech therapy.  VFSS on 2/15/2024 cleared him for a puréed diet with thin liquids.  Patient was also started by psychiatry for depression and started on Zoloft 25 mg daily.  Patient sustained a rapid response and a unwitnessed fall with head trauma notable bump on head on 2/15/2024.  CT head showing a new small volume acute subarachnoid hemorrhage in the left frontal opercular region as well as new nondisplaced fracture of the zygomatic arch with soft tissue contusion, stable 3 mm subdural hemorrhage seen previously.  [facility] transferred [facility] [place] [facility] evaluation.  Patient seen by neurology and neurosurgery.  CTA head and neck showing a left anterior hypodensity in the left anterior lateral temporal lobe with 2 punctate hyperdensities.  These represented possible nonhemorrhagic contusions or venous infarcts.  Less likely to represent ischemia or neoplasm.  Follow-up imaging recommended with CT head in 2-3 weeks.  CT  venogram showing patent dural venous sinuses.  Patient was cleared for DVT prophylaxis and started on Keppra for seizure prophylaxis.    Medical course complicated by suspected UTI based on UA and symptoms.  Patient was treated with antibiotics x 3 days.  After medical stabilization, patient found to have acute functional deficits from his baseline in mobility, self-care, speech swallow cognition therefore admitted to [facility] for acute inpatient rehabilitation.    SUBJECTIVE: Patient seen face to face in OT today.  No acute complaints.  Slept well last night per his report.  Denies pain or dizziness.        ASSESSMENT: Stable, progressing      PLAN:    Rehabilitation  Functional deficits: Aphasia, dysphagia, impaired cognition, impaired balance, impulsivity, poor safety awareness impaired mobility, self care    Continue current rehabilitation plan of care to maximize function.    Expected Discharge: TBD.  Awaiting bed at the [facility]      DVT prophylaxis  Continue Lovenox      * Intracranial hemorrhage (HCC)  Assessment & Plan  Initial presentation on 2/13/2024 with increased weakness in the legs, mental status changes for several days  CT head showing a 3 mm left frontal subdural hemorrhage  Rapid response 2/15/2024 with fall and head trauma  CT head showing a new small volume acute subarachnoid hemorrhage in the left frontal opercular region and new nondisplaced fracture of the zygomatic arch with soft tissue contusion.    Transferred to [facility]  CTA head and neck and and repeat CT head showing a left anterior hypodensity in the left anterior lateral temporal lobe with 2 punctate hyperdensities.  Differential diagnosis nonhemorrhagic contusions or venous infarcts.  Patient seen by neurology and neurosurgery  Conservative management  Cleared for DVT prophylaxis  Started on Keppra for seizure prophylaxis  Repeat CT head scheduled on Monday, 3/4/2024 -personally reviewed  Radiology read as follows:  Improving left  anterior temporal lobe contusions with resolving edema. Stable subacute subdural hemorrhage along the anterior left frontal convexity. Resolved subarachnoid and intraventricular hemorrhages. Unchanged minimal hyperdensity beneath the left craniotomy flap. Stable right parafalcine subdural hygroma. There is no new intra or extra-axial hemorrhage.  Follow-up with neurosurgery on 3/6/2024 virtually completed.  Recommendations as follows: Neurosurgery will sign off, patient will follow-up in 2 weeks with repeat CT head   CTH 3/16/24: No acute intracranial abnormality.Stable thin chronic left frontal subdural collection stable since 11/10/2023. Evaluation of the left temporal lobe is limited due to artifact from the cochlear implant but there may be mild developing encephalomalacia related to prior contusion.  Follow-up with neurosurgery and neurology in outpatient clinic.  Neurology virtual visit for next week    Behavior safety risk  Assessment & Plan  Patient demonstrating mild impulsivity in acute care  Fall x 2 in acute care  Falls at home in the past    Safety behavior plan going well and ARC.  No further impulsivity  Nursing and staff to provide close supervision near nursing station  Patient placed on 4 side rails (not as a restraint, patient and wife preference)  Monitor sleep-wake cycle  Avoid overstimulation: 1 visitor at a time, low lights, low sounds      Hematuria  Assessment & Plan  Started 3/2 - 3/3/2024  Minor, and more likely related to irritation from Finley catheter  Irrigation as needed per urology  Asymptomatic  H&H stable    Headache  Assessment & Plan  Resolved  Continue Tylenol 650 mg PRN     Severe protein-calorie malnutrition (HCC)  Assessment & Plan  BMI 17.64  Nutrition following  Optimize oral intake  Supplements ordered  Calorie count ordered - he is eating % of meals  Hydration is variable  Maintain IV  IVF PRN    Dysphagia  Assessment & Plan  VFSS completed 2/15/2024  Cleared for a  puréed diet with nectar thick liquids  SLP to follow while at [facility]  Repeat VFSS 3/11/24: Mild oral dysphagia, Moderate oral-pharyngeal dysphagia  Recommendations:  Diet: Dysphagia 1 pureed   Liquids: Nectar   Patient doing very well with oral food eating % of all of his meals.  Also gaining weight.  At times has difficulty with fluid intake due to nectar thick liquids and dysphagia.  Repeat swallow study recommended in 3-4 weeks  Supplement with IV fluids if needed  SLP also working towards a free water nectar thick protocol with oral care prior      Low BP  Assessment & Plan  Resolved  Continue midodrine 2.5 mg BID (3/12/24)    BP lower since 3/10/2024  Compression stockings and abdominal binder ordered  Internal medicine ordering IV fluid bolus.  I continued IVF x 1 L which was completed  BP continues to be very low especially in standing.   Labs reviewed - nothing to explain low BP  UA equivocal.- neg.   Culture was done by internal medicine.  Likely colonized.  Asymptomatic. No treatment after their discussion with ID.         Urinary retention  Assessment & Plan  Finley catheter removed on [facility]  Trial of void underway  Mixed pattern  Unfortunately required multiple straight catheterizations  Finley catheter replaced 3/1/24 per urology  Flomax reduced to 0.4 mg daily due to hypotension   Trial of void 3/7/2024 - failed this  Finley re-inserted 3/8/24  Patient likely to require longer time prior to removal of Finley catheter -recommend removal in 2 weeks around 3/22/2024      Constipation due to neurogenic bowel  Assessment & Plan  Patient started on more aggressive bowel program yesterday  BM 3/17/24    Hearing loss  Assessment & Plan  Chronic hearing loss  Patient status post cochlear implant  MRI contraindicated    Hypercholesterolemia  Assessment & Plan  Continue simvastatin 20 mg daily (home dose)    UTI (urinary tract infection)-resolved as of 2/21/2024  Assessment & Plan  Suspected UTI based on UA and  symptoms in acute care  Completed 3 days IV antibiotics  In ARC:  UA was ordered by IM : equivocal - neg.   Culture was done by internal medicine.  Enterococcus faecalis  Likely colonized.  Asymptomatic.  Over the weekend, however, Dr. Paz did decide to initiate treatment with Levaquin x 5 days.          Appreciate IM consultants medical co-management.  Labs, medications, and imaging personally reviewed.      ROS:  A ten point review of systems was completed on 03/18/24 and pertinent positives are listed in subjective section. All other systems reviewed were negative.       OBJECTIVE:   BP (!) 84/56 (BP Location: Right arm)   Pulse (!) 106   Temp (!) 97.4 °F (36.3 °C) (Temporal)   Resp 16   Ht 6' (1.829 m)   Wt 60.4 kg (133 lb 2.5 oz)   SpO2 93%   BMI 18.06 kg/m²     Physical Exam  Vitals and nursing note reviewed.   Constitutional:       General: He is not in acute distress.  HENT:      Head: Normocephalic and atraumatic.      Nose: Nose normal.      Mouth/Throat:      Mouth: Mucous membranes are moist.   Eyes:      Conjunctiva/sclera: Conjunctivae normal.   Cardiovascular:      Rate and Rhythm: Normal rate and regular rhythm.      Pulses: Normal pulses.   Pulmonary:      Effort: Pulmonary effort is normal.      Breath sounds: Normal breath sounds. No wheezing or rales.   Abdominal:      General: Bowel sounds are normal. There is no distension.      Palpations: Abdomen is soft.      Tenderness: There is no abdominal tenderness.   Genitourinary:     Comments: +Finley  Musculoskeletal:         General: No swelling.      Cervical back: Neck supple.   Skin:     General: Skin is warm.   Neurological:      Mental Status: He is alert.      Comments: Aphasia, but initiating some more conversation   Psychiatric:         Mood and Affect: Mood normal.          Lab Results   Component Value Date    WBC 6.27 03/18/2024    HGB 11.9 (L) 03/18/2024    HCT 37.3 03/18/2024    MCV 98 03/18/2024     03/18/2024     Lab  Results   Component Value Date    SODIUM 137 03/18/2024    K 4.3 03/18/2024     03/18/2024    CO2 28 03/18/2024    BUN 15 03/18/2024    CREATININE 0.56 (L) 03/18/2024    GLUC 82 03/18/2024    CALCIUM 8.5 03/18/2024     Lab Results   Component Value Date    INR 1.02 06/02/2022    INR 1.10 05/29/2022    INR 1.04 05/28/2022    PROTIME 13.0 06/02/2022    PROTIME 13.7 05/29/2022    PROTIME 13.2 05/28/2022           Current Facility-Administered Medications:     acetaminophen (TYLENOL) tablet 650 mg, 650 mg, Oral, Q6H PRN, Daniel Clayton MD, 650 mg at 03/17/24 1725    bisacodyl (DULCOLAX) rectal suppository 10 mg, 10 mg, Rectal, Daily PRN, Darwin Paz MD    clotrimazole (MYCELEX) davy 10 mg, 10 mg, Oral, 5x Daily, Darwin Paz MD, 10 mg at 03/18/24 0847    cyanocobalamin (VITAMIN B-12) tablet 500 mcg, 500 mcg, Oral, Daily, Darwin Paz MD, 500 mcg at 03/18/24 0846    enoxaparin (LOVENOX) subcutaneous injection 40 mg, 40 mg, Subcutaneous, Q24H JEANNIE, Alexandrea Lugo MD, 40 mg at 03/18/24 0846    lactulose (CHRONULAC) oral solution 20 g, 20 g, Oral, BID PRN, Darwin Paz MD, 20 g at 03/05/24 2107    levofloxacin (LEVAQUIN) tablet 500 mg, 500 mg, Oral, Q24H, Darwin Paz MD, 500 mg at 03/17/24 1616    loratadine (CLARITIN) tablet 10 mg, 10 mg, Oral, Daily, Amada L Dagnall, PA-C, 10 mg at 03/18/24 0846    meclizine (ANTIVERT) tablet 25 mg, 25 mg, Oral, Q8H PRN, Amada L Dagnall, PA-C    melatonin tablet 6 mg, 6 mg, Oral, HS, Amada L Dagnall, PA-C, 6 mg at 03/17/24 2127    midodrine (PROAMATINE) tablet 2.5 mg, 2.5 mg, Oral, TID AC, Darwin Paz MD    multivitamin-minerals (CENTRUM) tablet 1 tablet, 1 tablet, Oral, Daily, Amada Epperson PA-C, 1 tablet at 03/18/24 0846    polyethylene glycol (MIRALAX) packet 17 g, 17 g, Oral, Daily PRN, Ashley Depadua, MD, 17 g at 03/05/24 1740    pravastatin (PRAVACHOL) tablet 40 mg, 40 mg, Oral, Daily With Dinner, Amada Epperson PA-C, 40 mg at 03/17/24 1724     senna (SENOKOT) tablet 8.6 mg, 1 tablet, Oral, HS, Alexandrea Lugo MD, 8.6 mg at 03/17/24 2127    sertraline (ZOLOFT) tablet 25 mg, 25 mg, Oral, Daily, Amada Epperson PA-C, 25 mg at 03/18/24 0846    tamsulosin (FLOMAX) capsule 0.4 mg, 0.4 mg, Oral, Daily With Dinner, Darwin Paz MD, 0.4 mg at 03/17/24 1724    Past Medical History:   Diagnosis Date    Arthritis     Encounter for general adult medical examination without abnormal findings 03/20/2019    Fall 11/03/2022    Hyperlipidemia     Macular degeneration, wet ([facility])     Urinary retention 06/02/2022       Patient Active Problem List    Diagnosis Date Noted    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    Behavior safety risk 02/20/2024    Hematuria 03/04/2024    Headache 02/20/2024    Bilateral lower extremity weakness 02/17/2024    Abnormal CT scan, lumbar spine 02/15/2024    Severe protein-calorie malnutrition (HCC) 02/14/2024    Debility 02/13/2024    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    Dysphagia 11/21/2023    Macular degeneration, age related 02/27/2023    Traumatic subdural hemorrhage with loss of consciousness of unspecified duration, initial encounter ([facility]) 02/27/2023    Sensorineural hearing loss (SNHL), bilateral 08/18/2022    Balance disorder 06/28/2022    Abnormal echocardiogram 06/27/2022    Right bundle-branch block 06/27/2022    Moderate protein-calorie malnutrition (HCC) 06/19/2022    Low BP 06/19/2022    Diastolic dysfunction 06/19/2022    EKG abnormalities 06/19/2022    Constipation due to neurogenic bowel 06/02/2022    Urinary retention 06/02/2022    SDH (subdural hematoma) ([facility]) 05/27/2022    Primary osteoarthritis of left knee 05/17/2022    Hygroma 04/26/2022    Right hand pain     Carpal tunnel syndrome on right     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    Overweight (BMI 25.0-29.9) 01/27/2021    Negative depression screening 09/26/2019    Hypercholesterolemia 07/12/2018    Borderline hypertension 07/12/2018    Hearing loss  04/08/2009          Alexandrea Lugo MD    I have spent a total time of 35 minutes on 03/18/24 in caring for this patient including Impressions, Documenting in the medical record, Reviewing / ordering tests, medicine, procedures  , Obtaining or reviewing history  , and Communicating with other healthcare professionals .      ** Please Note:  voice to text software may have been used in the creation of this document. Although proof errors in transcription or interpretation are a potential of such software**

## 2024-03-18 NOTE — PROGRESS NOTES
03/18/24 1000   Pain Assessment   Pain Assessment Tool 0-10   Pain Score No Pain   Restrictions/Precautions   Precautions Aspiration;Bed/chair alarms;Cognitive;Fall Risk;Hard of hearing;Impulsive;Supervision on toilet/commode;Finley   Cognition   Overall Cognitive Status Impaired   Arousal/Participation Alert;Responsive;Cooperative   Attention Attends with cues to redirect   Orientation Level Oriented to person;Oriented to place   Memory Decreased short term memory;Decreased recall of recent events;Decreased recall of precautions   Following Commands Follows one step commands with increased time or repetition   Sit to Stand   Type of Assistance Needed Incidental touching;Supervision   Comment cg/close S with increased visual/tactile cues for sequence and hand placement   Sit to Stand CARE Score 4   Bed-Chair Transfer   Type of Assistance Needed Incidental touching;Supervision   Comment cg/close S with increased visual/tactile cues for sequence and hand placement   Chair/Bed-to-Chair Transfer CARE Score 4   Walk 10 Feet   Type of Assistance Needed Incidental touching   Comment cg level and unlevel surfaces with RW; increased visual/tactile cues for sequence and direction   Walk 10 Feet CARE Score 4   Walk 50 Feet with Two Turns   Type of Assistance Needed Incidental touching   Comment cg level and unlevel surfaces with RW; increased visual/tactile cues for sequence and direction   Walk 50 Feet with Two Turns CARE Score 4   Walk 150 Feet   Type of Assistance Needed Incidental touching   Comment cg level and unlevel surfaces with RW; increased visual/tactile cues for sequence and direction   Walk 150 Feet CARE Score 4   Walking 10 Feet on Uneven Surfaces   Type of Assistance Needed Incidental touching   Comment cg level and unlevel surfaces with RW; increased visual/tactile cues for sequence and direction   Walking 10 Feet on Uneven Surfaces CARE Score 4   Ambulation   Primary Mode of Locomotion Prior to Admission  Walk   Distance Walked (feet) 178 ft  (178' 137')   Assist Device Roller Walker   Gait Pattern Inconsistant Maria Victoria;Slow Maria Victoria;Decreased foot clearance;Narrow ILIANA;Forward Flexion   Limitations Noted In Balance;Coordination;Device Management;Endurance;Posture;Safety;Strength   Provided Assistance with: Balance;Direction   Walk Assist Level Contact Guard   Findings cg level and unlevel surfaces with RW; increased visual/tactile cues for sequence and direction   Does the patient walk? 2. Yes   Curb or Single Stair   Style negotiated Single stair   Type of Assistance Needed Incidental touching   Comment cg with 2 handrails; increased visual/tactile cues for sequence, technique, and direction   1 Step (Curb) CARE Score 4   4 Steps   Type of Assistance Needed Incidental touching   Comment cg with 2 handrails; increased visual/tactile cues for sequence, technique, and direction   4 Steps CARE Score 4   12 Steps   Type of Assistance Needed Incidental touching   Comment cg with 2 handrails; increased visual/tactile cues for sequence, technique, and direction   12 Steps CARE Score 4   Stairs   Type Stairs   # of Steps 14   Weight Bearing Precautions WBAT;Fall Risk   Assist Devices Bilateral Rail   Findings cg with 2 handrails; increased visual/tactile cues for sequence, technique, and direction   Therapeutic Interventions   Strengthening seated ther ex   Balance gait and transfer training   Other stair negotiation   Assessment   Treatment Assessment Patient agreeable to therapy session.   No pain reported.    Finley remains; dependent for management.     Continues to require increased visual/tactile cues for sequence, technique, and direction with all tasks.  Completed ther ex for general LE strengthening; gait and transfer training focusing on sequence and technique for improved balance and safety with functional mobility using walker.  Negotiated steps with 2 handrails and cues for sequence/technique.  Patient appropriate  for concurrent therapy to increase motivation and encouragement among pts with similar deficits while completing therapy session.   PT Barriers   Physical Impairment Decreased strength;Decreased range of motion;Decreased endurance;Impaired balance;Decreased mobility;Decreased coordination;Impaired judgement;Decreased safety awareness;Impaired hearing   Functional Limitation Wheelchair management;Walking;Transfers;Standing;Stair negotiation;Ramp negotiation;Car transfers   Plan   Treatment/Interventions Functional transfer training;LE strengthening/ROM;Elevations;Therapeutic exercise;Gait training   Progress Progressing toward goals   PT Therapy Minutes   PT Time In 1000   PT Time Out 1100   PT Total Time (minutes) 60   PT Mode of treatment - Individual (minutes) 30   PT Mode of treatment - Concurrent (minutes) 30   PT Mode of treatment - Group (minutes) 0   PT Mode of treatment - Co-treat (minutes) 0   PT Mode of Treatment - Total time(minutes) 60 minutes   PT Cumulative Minutes 1822   Therapy Time missed   Time missed? No

## 2024-03-18 NOTE — PROGRESS NOTES
03/18/24 1130   Pain Assessment   Pain Assessment Tool 0-10   Pain Score No Pain   Pain Rating: FLACC (Rest) - Face 0   Pain Rating: FLACC (Rest) - Legs 0   Pain Rating: FLACC (Rest) - Activity 0   Pain Rating: FLACC (Rest) - Cry 0   Pain Rating: FLACC (Rest) - Consolability 0   Score: FLACC (Rest) 0   Restrictions/Precautions   Precautions Aspiration;Aphasia;Bed/chair alarms;Cognitive;Fall Risk;Hard of hearing;Supervision on toilet/commode   Comprehension   Comprehension (FIM) 4 - Understands basic info/conversation 75-90% of time   Expression   Expression (FIM) 3 - Expresses basic info/needs 50-74% of time   Social Interaction   Social Interaction (FIM) 5 - Interacts appropriately with others 90% of time   Problem Solving   Problem solving (FIM) 3 - Solves basic problmes 50-74% of time   Memory   Memory (FIM) 3 - Recognizes, recalls/performs 50-74%   Memory Skills   Orientation Level Oriented to person;Oriented to place   Eating   Type of Assistance Needed Supervision   Physical Assistance Level No physical assistance   Eating CARE Score 4   Swallow Assessment   Swallow Treatment Assessment Pt assessed with 4oz of cold water via straw. Controlled small single sips via SLP pinching straw and removing straw. Cough x2 across 4 oz which patient continues to demonstrate better control and tolerance with. Completed effortful swallow x10. Patient's wife present and able to demonstrate understanding and completion of this strategy. He is safe to continue this method when wife is present with water only as discussed.   SLP Therapy Minutes   SLP Time In 1130   SLP Time Out 1200   SLP Total Time (minutes) 30   SLP Mode of treatment - Individual (minutes) 30   SLP Mode of treatment - Concurrent (minutes) 0   SLP Mode of treatment - Group (minutes) 0   SLP Mode of treatment - Co-treat (minutes) 0   SLP Mode of Treatment - Total time(minutes) 30 minutes   SLP Cumulative Minutes 945

## 2024-03-18 NOTE — NURSING NOTE
Patient is awake and alert. OOB to bedside recliner with assist of 1 with RW. Participated in therapy sessions and tolerated same well.  Medicated x 1 for mild back pain with adequate relief obtained.  Finley catheter patent and draining clear yellow urine.  Supervision for meals. Appetite good . Fluid intake encouraged. Wife supportive at bedside.

## 2024-03-18 NOTE — PROGRESS NOTES
03/18/24 1345   Pain Assessment   Pain Assessment Tool 0-10   Pain Score No Pain   Restrictions/Precautions   Precautions Aspiration;Bed/chair alarms;Cognitive;Fall Risk;Finley;Hard of hearing   Weight Bearing Restrictions No   ROM Restrictions No   Sit to Lying   Type of Assistance Needed Supervision   Physical Assistance Level No physical assistance   Sit to Lying CARE Score 4   Sit to Stand   Type of Assistance Needed Supervision   Physical Assistance Level No physical assistance   Sit to Stand CARE Score 4   Bed-Chair Transfer   Type of Assistance Needed Incidental touching   Comment CG   Chair/Bed-to-Chair Transfer CARE Score 4   Transfer Bed/Chair/Wheelchair   Limitations Noted In Balance;Endurance;Problem Solving;Sequencing;LE Strength   Adaptive Equipment Roller Walker   Exercise Tools   Other Exercise Tool 1 x10 elbow flexion/extension and pronation/supination using 1# DB in bilat hands   Other Exercise Tool 2 sorted playing cards based on suits into respective piles, pt also verbalized each number and suit with prompting from OT, occasional difficulty producing words however was able to do so with increased time and cues; two errors easily corrected by visual cues   Cognition   Overall Cognitive Status Impaired   Arousal/Participation Alert;Responsive;Cooperative   Attention Attends with cues to redirect   Orientation Level Oriented to person;Oriented to place   Memory Decreased short term memory;Decreased recall of recent events;Decreased recall of precautions   Following Commands Follows one step commands with increased time or repetition   Assessment   Treatment Assessment Pt seen for brief OT session this PM focusing on cognition/direction following, activity tolerance, and UE strengthening; details in respective sections. Pt with good sustained attention for activties, requesting only one rest break. Pt verbalized more with prompting from OT, word finding difficulty noted infrequently. Pt's wife  present during session and provided good encouragement and cues. Pt's barriers to d/c include decreased strength throughout, decreased balance, impaired hearing, impaired cognition including safety awareness, problem solving, attention, comprehension, and expression, and decreased activity tolerance; all affect independence in self care and fxl transfers. Pt would benefit from continued skilled OT services in order to address listed barriers and prepare for safe d/c.   Prognosis Fair   Problem List Decreased strength;Decreased range of motion;Decreased endurance;Impaired balance;Decreased coordination;Decreased cognition;Decreased safety awareness;Impaired hearing   Plan   Treatment/Interventions ADL retraining;Functional transfer training;LE strengthening/ROM;Therapeutic exercise;Endurance training;Patient/family training;Cognitive reorientation;Equipment eval/education;Compensatory technique education;OT   Progress Progressing toward goals   OT Therapy Minutes   OT Time In 1345   OT Time Out 1420   OT Total Time (minutes) 35   OT Mode of treatment - Individual (minutes) 35

## 2024-03-18 NOTE — NUTRITION
03/18/24 1256   Biochemical Data,Medical Tests, and Procedures   Biochemical Data/Medical Tests/Procedures Lab values reviewed;Meds reviewed   Labs (Comment) 3/18/24 Hemoglobin 11.9   Meds (Comment) mycelex, vitamin B12, levaquin, melatonin, proamattine, centrum, pravachol, senokot, zoloft, tylenol, chronulac, miralax   Speech Therapy Recommendations (Comment) Continues to receive speech therapy.   Nutrition-Focused Physical Exam   Nutrition-Focused Physical Exam Findings RN skin assessment reviewed;No skin issues documented;No edema documented   Medical-Related Concerns PMH reviewed.   Adequacy of Intake   Nutrition Modality PO   Feeding Route   PO Independent  (with supervision)   Current PO Intake   Current Diet Order Dysphagia 1, double portions, NTL extra sauce/gravy.   Nutrition Supplements Magic Cup;Mighty Shake  (Mightyshake TID. Magic cup BID.)   Current Meal Intake 50-75%;%   Intake Supplements 50-75%;%   Estimated calorie intake compared to estimated need Nutrient needs are met.   PES Statement   Problem Continue previous diagnosis   Recommendations/Interventions   Malnutrition/BMI Present Yes  (As previously documented.)   Summary Nutrition follow-up assessment. Dysphagia 1, double portions, NTL extra sauce/gravy. B: oatmeal, yogurt, applesauce, pudding. L/D: yogurt, applesauce, pudding, strained nectar thick soup. Mightyshake TID. Magic cup BID. Meal completions %. Supplement intake varies. 3/18/24 133# BMI 18.06 7# weight gain from admission 2/20/24 126#. No edema. Skin intact. Indwelling catheter present. LBM 3/17. Oriented to person/place. Oriented to time with exception of the month. Deaf. Patient continues to have good appetite per RN. Nutrient needs are met. Continue diet as prescribed.   Interventions/Recommendations Continue current diet order;Supplement continue   Education Assessment   Education Patient/caregiver not appropriate for education at this time   Patient  Nutrition Goals   Goal Tolerate PO diet;Meet PO needs;Avoid weight loss   Goal Status Met;Extended   Timeframe to complete goal by next f/u   Nutrition Complexity Risk   Nutrition complexity level Moderate risk   Nutrition review: 03/22/24   Follow up date 03/22/24

## 2024-03-18 NOTE — PROGRESS NOTES
03/18/24 0700   Pain Assessment   Pain Assessment Tool 0-10   Pain Score No Pain   Restrictions/Precautions   Precautions Aspiration;Bed/chair alarms;Cognitive;Fall Risk;Finley;Hard of hearing   Weight Bearing Restrictions No   ROM Restrictions No   Eating   Type of Assistance Needed Supervision   Physical Assistance Level No physical assistance   Comment coughing after approx 25% of bites which increased as fatigue appeared to increase; pt required more cues than usual to increase food intake as well   Eating CARE Score 4   Eating Assessment   Food To Mouth Yes   Able To Cut   (able to cut puree waffles with side of fork)   Noted Coughing   Positioning Upright;Out of Bed   Safety Needs Increase Time;Cues;Freq Swallow   Meal Assessed Breakfast   Intake Mode PO   Finishes Timely No   Opens Packages No   Oral Hygiene   Type of Assistance Needed Supervision   Physical Assistance Level No physical assistance   Comment cues to initiate rinsing mouth   Oral Hygiene CARE Score 4   Grooming   Able To Comb/Brush Hair;Wash/Dry Face;Brush/Clean Teeth   Limitation Noted In Problem Solving;Safety;Sequencing   Shower/Bathe Self   Type of Assistance Needed Physical assistance   Physical Assistance Level 26%-50%   Comment assist for bilat lower LE, buttocks; Finley wipes completed by OT   Shower/Bathe Self CARE Score 3   Bathing   Assessed Bath Style Sponge Bath   Anticipated D/C Bath Style Sponge Bath;Shower   Able to Gather/Transport No   Able to Wash/Rinse/Dry (body part) Left Arm;Right Arm;R Upper Leg;L Upper Leg;Chest;Abdomen   Limitations Noted in Balance;Endurance;Problem Solving;ROM;Safety;Sequencing   Positioning Seated;Standing   Upper Body Dressing   Type of Assistance Needed Physical assistance   Physical Assistance Level 25% or less   Comment assist to don new flannel shirt over bilat UE, align new to sihrt to don   Upper Body Dressing CARE Score 3   Lower Body Dressing   Type of Assistance Needed Physical assistance    Physical Assistance Level 76% or more   Comment able to assist in pulling clothing from mid shin to knee when seated, pulled clothing up over hips and buttocks after visual cue to complete   Lower Body Dressing CARE Score 2   Putting On/Taking Off Footwear   Type of Assistance Needed Physical assistance   Physical Assistance Level 76% or more   Comment able to present bilat feet to OT to doff/don footwear   Putting On/Taking Off Footwear CARE Score 2   Dressing/Undressing Clothing   Remove UB Clothes Pullover Shirt;Jacket   Don UB Clothes Pullover Shirt;Jacket   Remove LB Clothes Pants;Undergarment;Socks   Don LB Clothes Pants;Undergarment;Socks;TEDs   Limitations Noted In Balance;Endurance;Problem Solving;Safety;Sequencing;Strength;ROM   Positioning Supported Sit   Lying to Sitting on Side of Bed   Type of Assistance Needed Physical assistance   Physical Assistance Level 25% or less   Lying to Sitting on Side of Bed CARE Score 3   Sit to Stand   Type of Assistance Needed Supervision   Physical Assistance Level No physical assistance   Sit to Stand CARE Score 4   Bed-Chair Transfer   Type of Assistance Needed Incidental touching   Comment    Chair/Bed-to-Chair Transfer CARE Score 4   Transfer Bed/Chair/Wheelchair   Limitations Noted In Balance;Endurance;Problem Solving;Sequencing;LE Strength   Adaptive Equipment Roller Walker   Coordination   Fine Motor placed pegs in foam pegboard with R hand, increased time to complete 2* impaired manipulation and pinch ; removed from pegboard and placed in bucket with same deficits noted   Cognition   Overall Cognitive Status Impaired   Arousal/Participation Alert;Responsive;Cooperative   Attention Attends with cues to redirect   Orientation Level Oriented to person;Oriented to place  (oriented to time with the exception of month, required multiple choice to correctly answer)   Memory Decreased short term memory;Decreased recall of recent events;Decreased recall of  precautions   Following Commands Follows one step commands with increased time or repetition   Assessment   Treatment Assessment Pt agreeable to OT session this AM, though reported to OT very tired and did not sleep well. ADL session completed; current LOF and details listed in respective sections. Pt is overall maxA LB dressing, Brayan UB dressing and bathing, supervision oral care; fxl transfers overall CG/supervision with the exception of Brayan supine>sit. Increased visual and physical cues required this session to initiate LB ADL tasks, however pt initiated and completed doffing flannel and t-shirt without cueing. Pt completed fine motor/attention activity after ADL with good tolerance overall; impaired manipulation continues in R hand with small objects. Supervision for breakfast tray required, pt with increased coughing as fatigue onset and increased visual cues as well to increase food intake. Pt's barriers to d/c include decreased strength throughout, decreased balance, impaired hearing, impaired cognition including safety awareness, problem solving, attention, comprehension, and expression, and decreased activity tolerance; all affect independence in self care and fxl transfers. Pt would benefit from continued skilled OT services in order to address listed barriers and prepare for safe d/c.   Prognosis Fair   Problem List Decreased strength;Decreased range of motion;Decreased endurance;Impaired balance;Decreased coordination;Decreased cognition;Decreased safety awareness;Impaired hearing   Plan   Treatment/Interventions ADL retraining;Functional transfer training;LE strengthening/ROM;Therapeutic exercise;Endurance training;Cognitive reorientation;Patient/family training;Equipment eval/education;Compensatory technique education;OT   Progress Progressing toward goals   OT Therapy Minutes   OT Time In 0700   OT Time Out 0834   OT Total Time (minutes) 94   OT Mode of treatment - Individual (minutes) 94

## 2024-03-19 PROCEDURE — 97535 SELF CARE MNGMENT TRAINING: CPT

## 2024-03-19 PROCEDURE — 97530 THERAPEUTIC ACTIVITIES: CPT

## 2024-03-19 PROCEDURE — 97116 GAIT TRAINING THERAPY: CPT

## 2024-03-19 PROCEDURE — 97129 THER IVNTJ 1ST 15 MIN: CPT

## 2024-03-19 PROCEDURE — 97537 COMMUNITY/WORK REINTEGRATION: CPT

## 2024-03-19 PROCEDURE — 97130 THER IVNTJ EA ADDL 15 MIN: CPT

## 2024-03-19 PROCEDURE — 92526 ORAL FUNCTION THERAPY: CPT | Performed by: NURSE PRACTITIONER

## 2024-03-19 PROCEDURE — 97110 THERAPEUTIC EXERCISES: CPT

## 2024-03-19 PROCEDURE — 99232 SBSQ HOSP IP/OBS MODERATE 35: CPT | Performed by: PHYSICAL MEDICINE & REHABILITATION

## 2024-03-19 RX ADMIN — LEVOFLOXACIN 500 MG: 250 TABLET, FILM COATED ORAL at 14:47

## 2024-03-19 RX ADMIN — ENOXAPARIN SODIUM 40 MG: 40 INJECTION SUBCUTANEOUS at 08:34

## 2024-03-19 RX ADMIN — LORATADINE 10 MG: 10 TABLET ORAL at 08:34

## 2024-03-19 RX ADMIN — MIDODRINE HYDROCHLORIDE 2.5 MG: 2.5 TABLET ORAL at 08:34

## 2024-03-19 RX ADMIN — SENNOSIDES 8.6 MG: 8.6 TABLET, FILM COATED ORAL at 21:09

## 2024-03-19 RX ADMIN — TAMSULOSIN HYDROCHLORIDE 0.4 MG: 0.4 CAPSULE ORAL at 17:21

## 2024-03-19 RX ADMIN — PRAVASTATIN SODIUM 40 MG: 40 TABLET ORAL at 17:21

## 2024-03-19 RX ADMIN — SERTRALINE HYDROCHLORIDE 25 MG: 25 TABLET ORAL at 08:34

## 2024-03-19 RX ADMIN — MIDODRINE HYDROCHLORIDE 2.5 MG: 2.5 TABLET ORAL at 17:21

## 2024-03-19 RX ADMIN — MIDODRINE HYDROCHLORIDE 2.5 MG: 2.5 TABLET ORAL at 11:46

## 2024-03-19 RX ADMIN — CLOTRIMAZOLE 10 MG: 10 LOZENGE ORAL; TOPICAL at 11:46

## 2024-03-19 RX ADMIN — CLOTRIMAZOLE 10 MG: 10 LOZENGE ORAL; TOPICAL at 08:35

## 2024-03-19 RX ADMIN — CLOTRIMAZOLE 10 MG: 10 LOZENGE ORAL; TOPICAL at 17:21

## 2024-03-19 RX ADMIN — CLOTRIMAZOLE 10 MG: 10 LOZENGE ORAL; TOPICAL at 21:09

## 2024-03-19 RX ADMIN — Medication 1 TABLET: at 08:34

## 2024-03-19 RX ADMIN — CLOTRIMAZOLE 10 MG: 10 LOZENGE ORAL; TOPICAL at 14:49

## 2024-03-19 RX ADMIN — CYANOCOBALAMIN TAB 500 MCG 500 MCG: 500 TAB at 08:34

## 2024-03-19 RX ADMIN — Medication 6 MG: at 21:09

## 2024-03-19 NOTE — PROGRESS NOTES
PM&R PROGRESS NOTE:  Thomas Chase 83 y.o. male MRN: [id number]  Unit/Bed#: -02 Encounter: [id number]        **CHANGE IN REHAB DIAGNOSIS FROM PRE-ADMISSION SCREEN  Rehab Diagnosis: Impairment of mobility, safety, Activities of Daily Living (ADLs), and cognitive/communication skills due to Brain Dysfunction:  02.22  Traumatic, Closed Injury  Etiologic Diagnosis: L SDH, R SAH, L anterior hypodensity lateral temporal lobe    HPI: Thomas Chase is a 83 y.o. 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  Unclear if patient had head trauma.  CT head showing a 3 mm left frontal subdural hemorrhage.  CT lumbar spine showing DJD.  Patient found to have dysphagia and was seen by speech therapy.  VFSS on 2/15/2024 cleared him for a puréed diet with thin liquids.  Patient was also started by psychiatry for depression and started on Zoloft 25 mg daily.  Patient sustained a rapid response and a unwitnessed fall with head trauma notable bump on head on 2/15/2024.  CT head showing a new small volume acute subarachnoid hemorrhage in the left frontal opercular region as well as new nondisplaced fracture of the zygomatic arch with soft tissue contusion, stable 3 mm subdural hemorrhage seen previously.  Patient transferred to [facility] for further evaluation.  Patient seen by neurology and neurosurgery.  CTA head and neck showing a left anterior hypodensity in the left anterior lateral temporal lobe with 2 punctate hyperdensities.  These represented possible nonhemorrhagic contusions or venous infarcts.  Less likely to represent ischemia or neoplasm.  Follow-up imaging recommended with CT head in 2-3 weeks.  CT  venogram showing patent dural venous sinuses.  Patient was cleared for DVT prophylaxis and started on Keppra for seizure prophylaxis.    Medical course complicated by suspected UTI based on UA and symptoms.  Patient was treated with antibiotics x 3 days.  After medical stabilization, patient found to have acute functional deficits from his baseline in mobility, self-care, speech swallow cognition therefore admitted to [facility] for acute inpatient rehabilitation.    SUBJECTIVE:  Patient seen briefly this morning.  Per nursing no new acute issues.  Blood pressure this morning improved.  Awaiting SNF bed.      ASSESSMENT: Stable, progressing      PLAN:    Rehabilitation  Functional deficits: Aphasia, dysphagia, impaired cognition, impaired balance, impulsivity, poor safety awareness impaired mobility, self care    Continue current rehabilitation plan of care to maximize function.    Expected Discharge: TBD.  Awaiting bed at the [facility]      DVT prophylaxis  Continue Lovenox      * Intracranial hemorrhage (HCC)  Assessment & Plan  Initial presentation on 2/13/2024 with increased weakness in the legs, mental status changes for several days  CT head showing a 3 mm left frontal subdural hemorrhage  Rapid response 2/15/2024 with fall and head trauma  CT head showing a new small volume acute subarachnoid hemorrhage in the left frontal opercular region and new nondisplaced fracture of the zygomatic arch with soft tissue contusion.    Transferred to [facility]  CTA head and neck and and repeat CT head showing a left anterior hypodensity in the left anterior lateral temporal lobe with 2 punctate hyperdensities.  Differential diagnosis nonhemorrhagic contusions or venous infarcts.  Patient seen by neurology and neurosurgery  Conservative management  Cleared for DVT prophylaxis  Started on Keppra for seizure prophylaxis  Repeat CT head scheduled on Monday, 3/4/2024 -personally reviewed  Radiology read as follows:  Improving left  anterior temporal lobe contusions with resolving edema. Stable subacute subdural hemorrhage along the anterior left frontal convexity. Resolved subarachnoid and intraventricular hemorrhages. Unchanged minimal hyperdensity beneath the left craniotomy flap. Stable right parafalcine subdural hygroma. There is no new intra or extra-axial hemorrhage.  Follow-up with neurosurgery on 3/6/2024 virtually completed.  Recommendations as follows: Neurosurgery will sign off, patient will follow-up in 2 weeks with repeat CT head   CTH 3/16/24: No acute intracranial abnormality.Stable thin chronic left frontal subdural collection stable since 11/10/2023. Evaluation of the left temporal lobe is limited due to artifact from the cochlear implant but there may be mild developing encephalomalacia related to prior contusion.  Follow-up with neurosurgery and neurology in outpatient clinic (appointment is 3/21/24 with neurology in 3/22/2024 with neurosurgery)    Behavior safety risk  Assessment & Plan  Patient demonstrating mild impulsivity in acute care  Fall x 2 in acute care  Falls at home in the past    Safety behavior plan going well and ARC.  No further impulsivity  Nursing and staff to provide close supervision near nursing station  Patient placed on 4 side rails (not as a restraint, patient and wife preference)  Monitor sleep-wake cycle  Avoid overstimulation: 1 visitor at a time, low lights, low sounds      Hematuria  Assessment & Plan  Started 3/2 - 3/3/2024  Minor, and more likely related to irritation from Finley catheter  Irrigation as needed per urology  Asymptomatic  H&H stable    Headache  Assessment & Plan  Resolved  Continue Tylenol 650 mg PRN     Severe protein-calorie malnutrition (HCC)  Assessment & Plan  BMI 18.06  Nutrition following  Optimize oral intake  Supplements ordered  Calorie count ordered - he is eating % of meals  Hydration is variable  Maintain IV  IVF PRN  SLP working towards free water  protocol    Dysphagia  Assessment & Plan  VFSS completed 2/15/2024  Cleared for a puréed diet with nectar thick liquids  SLP to follow while at [facility]  Repeat VFSS 3/11/24: Mild oral dysphagia, Moderate oral-pharyngeal dysphagia  Recommendations:  Diet: Dysphagia 1 pureed   Liquids: Nectar   Patient doing very well with oral food eating % of all of his meals.  Also gaining weight.  At times has difficulty with fluid intake due to nectar thick liquids and dysphagia.  Repeat swallow study recommended in 3-4 weeks  Supplement with IV fluids if needed  SLP also working towards a free water nectar thick protocol with oral care prior      Low BP  Assessment & Plan  Resolved  Continue midodrine 2.5 mg BID (3/12/24)    BP lower since 3/10/2024  Compression stockings and abdominal binder ordered  Internal medicine ordering IV fluid bolus.  I continued IVF x 1 L which was completed  BP continues to be very low especially in standing.   Labs reviewed - nothing to explain low BP        Urinary retention  Assessment & Plan  Finley catheter removed on [facility]  Trial of void underway  Mixed pattern  Unfortunately required multiple straight catheterizations  Finley catheter replaced 3/1/24 per urology  Flomax reduced to 0.4 mg daily due to hypotension   Trial of void 3/7/2024 - failed this  Finley re-inserted 3/8/24  Patient likely to require longer time prior to removal of Finley catheter -recommend removal in 2 weeks around 3/22/2024      Constipation due to neurogenic bowel  Assessment & Plan  Patient started on more aggressive bowel program yesterday  BM 3/17/24    Hearing loss  Assessment & Plan  Chronic hearing loss  Patient status post cochlear implant  MRI contraindicated    Hypercholesterolemia  Assessment & Plan  Continue simvastatin 20 mg daily (home dose)    UTI (urinary tract infection)-resolved as of 2/21/2024  Assessment & Plan  Suspected UTI based on UA and symptoms in acute care  Completed 3 days IV antibiotics  In  ARC:  UA was ordered by IM : equivocal - neg.   Culture was done by internal medicine.  Enterococcus faecalis  Likely colonized.  Asymptomatic.  Over the weekend, however, Dr. Paz did decide to initiate treatment with Levaquin x 5 days.          Appreciate IM consultants medical co-management.  Labs, medications, and imaging personally reviewed.      ROS:  A ten point review of systems was completed on 03/19/24 and pertinent positives are listed in subjective section. All other systems reviewed were negative.       OBJECTIVE:   /55 (BP Location: Left arm)   Pulse 80   Temp 98.6 °F (37 °C) (Temporal)   Resp 19   Ht 6' (1.829 m)   Wt 60.4 kg (133 lb 2.5 oz)   SpO2 94%   BMI 18.06 kg/m²     Physical Exam  Vitals and nursing note reviewed.   Constitutional:       General: He is not in acute distress.     Appearance: He is well-developed.   HENT:      Head: Normocephalic.      Nose: Nose normal.   Eyes:      Conjunctiva/sclera: Conjunctivae normal.   Pulmonary:      Effort: Pulmonary effort is normal.      Breath sounds: No wheezing.   Abdominal:      General: There is no distension.      Palpations: Abdomen is soft.   Musculoskeletal:         General: No swelling.      Cervical back: Neck supple.   Skin:     General: Skin is warm.   Neurological:      Mental Status: He is alert and oriented to person, place, and time.   Psychiatric:         Mood and Affect: Mood normal.          Lab Results   Component Value Date    WBC 6.27 03/18/2024    HGB 11.9 (L) 03/18/2024    HCT 37.3 03/18/2024    MCV 98 03/18/2024     03/18/2024     Lab Results   Component Value Date    SODIUM 137 03/18/2024    K 4.3 03/18/2024     03/18/2024    CO2 28 03/18/2024    BUN 15 03/18/2024    CREATININE 0.56 (L) 03/18/2024    GLUC 82 03/18/2024    CALCIUM 8.5 03/18/2024     Lab Results   Component Value Date    INR 1.02 06/02/2022    INR 1.10 05/29/2022    INR 1.04 05/28/2022    PROTIME 13.0 06/02/2022    PROTIME 13.7  05/29/2022    PROTIME 13.2 05/28/2022           Current Facility-Administered Medications:     acetaminophen (TYLENOL) tablet 650 mg, 650 mg, Oral, Q6H PRN, Daniel Clayton MD, 650 mg at 03/18/24 2326    bisacodyl (DULCOLAX) rectal suppository 10 mg, 10 mg, Rectal, Daily PRN, Darwin Paz MD    clotrimazole (MYCELEX) davy 10 mg, 10 mg, Oral, 5x Daily, Darwin Paz MD, 10 mg at 03/19/24 0835    cyanocobalamin (VITAMIN B-12) tablet 500 mcg, 500 mcg, Oral, Daily, Darwin Paz MD, 500 mcg at 03/19/24 0834    enoxaparin (LOVENOX) subcutaneous injection 40 mg, 40 mg, Subcutaneous, Q24H JEANNIE, Alexandrea Lugo MD, 40 mg at 03/19/24 0834    lactulose (CHRONULAC) oral solution 20 g, 20 g, Oral, BID PRN, Darwin Paz MD, 20 g at 03/05/24 2107    levofloxacin (LEVAQUIN) tablet 500 mg, 500 mg, Oral, Q24H, Darwin Paz MD, 500 mg at 03/18/24 1435    loratadine (CLARITIN) tablet 10 mg, 10 mg, Oral, Daily, Amada L Dagnall, PA-C, 10 mg at 03/19/24 0834    meclizine (ANTIVERT) tablet 25 mg, 25 mg, Oral, Q8H PRN, Amada L Dagnall, PA-C    melatonin tablet 6 mg, 6 mg, Oral, HS, Amada L Dagnall, PA-C, 6 mg at 03/18/24 2150    midodrine (PROAMATINE) tablet 2.5 mg, 2.5 mg, Oral, TID AC, Darwin Paz MD, 2.5 mg at 03/19/24 0834    multivitamin-minerals (CENTRUM) tablet 1 tablet, 1 tablet, Oral, Daily, Amada L Dagnall, PA-C, 1 tablet at 03/19/24 0834    polyethylene glycol (MIRALAX) packet 17 g, 17 g, Oral, Daily PRN, Ashley Depadua, MD, 17 g at 03/05/24 1740    pravastatin (PRAVACHOL) tablet 40 mg, 40 mg, Oral, Daily With Dinner, Amada Epperson PA-C, 40 mg at 03/18/24 1728    senna (SENOKOT) tablet 8.6 mg, 1 tablet, Oral, HS, Alexandrea Lugo MD, 8.6 mg at 03/18/24 2150    sertraline (ZOLOFT) tablet 25 mg, 25 mg, Oral, Daily, Amada Epperson PA-C, 25 mg at 03/19/24 0834    tamsulosin (FLOMAX) capsule 0.4 mg, 0.4 mg, Oral, Daily With Dinner, Darwin Paz MD, 0.4 mg at 03/18/24 1728    Past Medical History:    Diagnosis Date    Arthritis     Encounter for general adult medical examination without abnormal findings 03/20/2019    Fall 11/03/2022    Hyperlipidemia     Macular degeneration, 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[facility])     Urinary retention 06/02/2022       Patient Active Problem List    Diagnosis Date Noted    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    Behavior safety risk 02/20/2024    Hematuria 03/04/2024    Headache 02/20/2024    Bilateral lower extremity weakness 02/17/2024    Abnormal CT scan, lumbar spine 02/15/2024    Severe protein-calorie malnutrition (HCC) 02/14/2024    Debility 02/13/2024    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    Dysphagia 11/21/2023    Macular degeneration, age related 02/27/2023    Traumatic subdural hemorrhage with loss of consciousness of unspecified duration, initial encounter ([facility]) 02/27/2023    Sensorineural hearing loss (SNHL), bilateral 08/18/2022    Balance disorder 06/28/2022    Abnormal echocardiogram 06/27/2022    Right bundle-branch block 06/27/2022    Moderate protein-calorie malnutrition (HCC) 06/19/2022    Low BP 06/19/2022    Diastolic dysfunction 06/19/2022    EKG abnormalities 06/19/2022    Constipation due to neurogenic bowel 06/02/2022    Urinary retention 06/02/2022    SDH (subdural hematoma) ([facility]) 05/27/2022    Primary osteoarthritis of left knee 05/17/2022    Hygroma 04/26/2022    Right hand pain     Carpal tunnel syndrome on right     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      ** Please Note:  voice to text software may have been used in the creation of this document. Although proof errors in transcription or interpretation are a  potential of such software**

## 2024-03-19 NOTE — PROGRESS NOTES
03/19/24 1200   Pain Assessment   Pain Assessment Tool 0-10   Pain Score No Pain   Pain Rating: FLACC (Rest) - Face 0   Pain Rating: FLACC (Rest) - Legs 0   Pain Rating: FLACC (Rest) - Activity 0   Pain Rating: FLACC (Rest) - Cry 0   Pain Rating: FLACC (Rest) - Consolability 0   Score: FLACC (Rest) 0   Restrictions/Precautions   Precautions Aspiration;Bed/chair alarms;Cognitive;Fall Risk;Hard of hearing;Finley;Supervision on toilet/commode   Speech/Language/Cognition Assessmetn   Treatment Assessment Following 1 step directions well but difficulty attending to continue to this direction throughout session with frequent reminders needed.   Swallow Assessment   Swallow Treatment Assessment Assessed with meal tray of puree and nectar. Trialed nectar via straw via pinch the straw to control sips; however, this didnt appear preferred by patient as he took the cup lid and placed into back onto the cup and removed the straw. 3 oz nectar thick via straw cough 4x via strategy of alternating bites and sips across the meal. Nectar thick liquid soup across 1 bowl with cough 4x. 75% of pureed solids assessed with cough x7. Remaining 1 oz of nectar thick liquid via 10CC provalve cup without any s.s of aspiration.    Again trialed for effortful and double swallows but not consistent carryover of this from trial to trial with frequent cueing needed    SLP Therapy Minutes   SLP Time In 1200   SLP Time Out 1230   SLP Total Time (minutes) 30   SLP Mode of treatment - Individual (minutes) 30   SLP Mode of treatment - Concurrent (minutes) 0   SLP Mode of treatment - Group (minutes) 0   SLP Mode of treatment - Co-treat (minutes) 0   SLP Mode of Treatment - Total time(minutes) 30 minutes   SLP Cumulative Minutes 975

## 2024-03-19 NOTE — PLAN OF CARE
Problem: PAIN - ADULT  Goal: Verbalizes/displays adequate comfort level or baseline comfort level  Description: Interventions:  - Encourage patient to monitor pain and request assistance  - Assess pain using appropriate pain scale  - Administer analgesics based on type and severity of pain and evaluate response  - Implement non-pharmacological measures as appropriate and evaluate response  - Consider cultural and social influences on pain and pain management  - Notify physician/advanced practitioner if interventions unsuccessful or patient reports new pain  Outcome: Progressing     Problem: INFECTION - ADULT  Goal: Absence or prevention of progression during hospitalization  Description: INTERVENTIONS:  - Assess and monitor for signs and symptoms of infection  - Monitor lab/diagnostic results  - Monitor all insertion sites, i.e. indwelling lines, tubes, and drains  - Monitor endotracheal if appropriate and nasal secretions for changes in amount and color  - [place] appropriate cooling/warming therapies per order  - Administer medications as ordered  - Instruct and encourage patient and family to use good hand hygiene technique  - Identify and instruct in appropriate isolation precautions for identified infection/condition  Outcome: Progressing  Goal: Absence of fever/infection during neutropenic period  Description: INTERVENTIONS:  - Monitor WBC    Outcome: Progressing     Problem: SAFETY ADULT  Goal: Patient will remain free of falls  Description: INTERVENTIONS:  - Educate patient/family on patient safety including physical limitations  - Instruct patient to call for assistance with activity   - Consult OT/PT to assist with strengthening/mobility   - Keep Call bell within reach  - Keep bed low and locked with side rails adjusted as appropriate  - Keep care items and personal belongings within reach  - Initiate and maintain comfort rounds  - Make Fall Risk Sign visible to staff  - Apply yellow socks and bracelet  for high fall risk patients  - Consider moving patient to room near nurses station  Outcome: Progressing  Goal: Maintain or return to baseline ADL function  Description: INTERVENTIONS:  -  Assess patient's ability to carry out ADLs; assess patient's baseline for ADL function and identify physical deficits which impact ability to perform ADLs (bathing, care of mouth/teeth, toileting, grooming, dressing, etc.)  - Assess/evaluate cause of self-care deficits   - Assess range of motion  - Assess patient's mobility; develop plan if impaired  - Assess patient's need for assistive devices and provide as appropriate  - Encourage maximum independence but intervene and supervise when necessary  - Involve family in performance of ADLs  - Assess for home care needs following discharge   - Consider OT consult to assist with ADL evaluation and planning for discharge  - Provide patient education as appropriate  Outcome: Progressing     Problem: DISCHARGE PLANNING  Goal: Discharge to home or other facility with appropriate resources  Description: INTERVENTIONS:  - Identify barriers to discharge w/patient and caregiver  - Arrange for needed discharge resources and transportation as appropriate  - Identify discharge learning needs (meds, wound care, etc.)  - Arrange for interpretive services to assist at discharge as needed  - Refer to Case Management Department for coordinating discharge planning if the patient needs post-hospital services based on physician/advanced practitioner order or complex needs related to functional status, cognitive ability, or social support system  Outcome: Progressing     Problem: Prexisting or High Potential for Compromised Skin Integrity  Goal: Skin integrity is maintained or improved  Description: INTERVENTIONS:  - Identify patients at risk for skin breakdown  - Assess and monitor skin integrity  - Assess and monitor nutrition and hydration status  - Monitor labs   - Assess for incontinence   - Turn  and reposition patient  - Assist with mobility/ambulation  - Relieve pressure over bony prominences  - Avoid friction and shearing  - Provide appropriate hygiene as needed including keeping skin clean and dry  - Evaluate need for skin moisturizer/barrier cream  - Collaborate with interdisciplinary team   - Patient/family teaching  - Consider wound care consult   Outcome: Progressing     Problem: Nutrition/Hydration-ADULT  Goal: Nutrient/Hydration intake appropriate for improving, restoring or maintaining nutritional needs  Description: Monitor and assess patient's nutrition/hydration status for malnutrition. Collaborate with interdisciplinary team and initiate plan and interventions as ordered.  Monitor patient's weight and dietary intake as ordered or per policy. Utilize nutrition screening tool and intervene as necessary. Determine patient's food preferences and provide high-protein, high-caloric foods as appropriate.     INTERVENTIONS:  - Monitor oral intake, urinary output, labs, and treatment plans  - Assess nutrition and hydration status and recommend course of action  - Evaluate amount of meals eaten  - Assist patient with eating if necessary   - Allow adequate time for meals  - Recommend/ encourage appropriate diets, oral nutritional supplements, and vitamin/mineral supplements  - Provide specific nutrition/hydration education as appropriate  - Include patient/family/caregiver in decisions related to nutrition  Outcome: Progressing

## 2024-03-19 NOTE — PROGRESS NOTES
03/19/24 0159   Pain Assessment   Pain Assessment Tool 0-10   Pain Score No Pain   Restrictions/Precautions   Precautions Aspiration;Bed/chair alarms;Cognitive;Fall Risk;Finley;Hard of hearing   Weight Bearing Restrictions No   ROM Restrictions No   Exercise Tools   Other Exercise Tool 1 cognitive matching book, completed matching lowercase to uppercase numbers and pictures to first letter of word, correct matching approx 75% of the time and able to fix errors after brought to attention by OT, as fatigue increased pt became less accurate and fixated on certain letters/pictures and required cues to attend to other stimuli   Coordination   Fine Motor pt gripped with R hand pinch  each letter or picture with slightly increased time to manipulate   Cognition   Overall Cognitive Status Impaired   Arousal/Participation Alert;Responsive;Cooperative   Attention Attends with cues to redirect   Orientation Level Oriented to person;Oriented to place   Memory Decreased short term memory;Decreased recall of recent events;Decreased recall of precautions   Following Commands Follows one step commands with increased time or repetition   Assessment   Treatment Assessment Pt seen for brief OT session this PM focusing on cognition and fine motor coordination; see respective sections for details. Pt completed activities with overall 75% accuracy and corrected errors with min verbal cues. Pt's wife present during session and provided excellent encouragement and support. Pt's barriers to d/c include decreased strength throughout, decreased balance, impaired hearing, impaired cognition including safety awareness, problem solving, attention, comprehension, and expression, and decreased activity tolerance; all affect independence in self care and fxl transfers. Pt would benefit from continued skilled OT services in order to address listed barriers and prepare for safe d/c.   Prognosis Fair   Problem List Decreased strength;Decreased  range of motion;Decreased endurance;Impaired balance;Decreased coordination;Decreased cognition;Decreased safety awareness;Impaired hearing   Plan   Treatment/Interventions ADL retraining;Functional transfer training;LE strengthening/ROM;Therapeutic exercise;Endurance training;Patient/family training;Equipment eval/education;Cognitive reorientation;Compensatory technique education;OT   Progress Progressing toward goals   OT Therapy Minutes   OT Time In 1335   OT Time Out 1410   OT Total Time (minutes) 35   OT Mode of treatment - Individual (minutes) 35

## 2024-03-19 NOTE — PROGRESS NOTES
03/19/24 0642   Pain Assessment   Pain Assessment Tool 0-10   Pain Score No Pain   Restrictions/Precautions   Precautions Aspiration;Bed/chair alarms;Cognitive;Fall Risk;Finley;Hard of hearing;Impulsive;Supervision on toilet/commode   Cognition   Overall Cognitive Status Impaired   Arousal/Participation Alert;Responsive;Cooperative   Attention Attends with cues to redirect   Orientation Level Oriented to person;Oriented to place   Memory Decreased short term memory;Decreased recall of recent events;Decreased recall of precautions   Following Commands Follows one step commands with increased time or repetition   Lying to Sitting on Side of Bed   Type of Assistance Needed Supervision   Comment bed rails; increased visual and tactile cues   Lying to Sitting on Side of Bed CARE Score 4   Sit to Stand   Type of Assistance Needed Supervision   Comment close S STS with RW; increased visual/tactile cues for sequence/hand placement   Sit to Stand CARE Score 4   Bed-Chair Transfer   Type of Assistance Needed Incidental touching   Comment cg SPT with RW; increased visual/tactile cues for sequence/technique   Chair/Bed-to-Chair Transfer CARE Score 4   Walk 10 Feet   Type of Assistance Needed Incidental touching   Comment cg level and unlevel surfaces with RW; increased visual/tactile cues for sequence/direction   Walk 10 Feet CARE Score 4   Walk 50 Feet with Two Turns   Type of Assistance Needed Incidental touching   Comment cg level and unlevel surfaces with RW; increased visual/tactile cues for sequence/direction   Walk 50 Feet with Two Turns CARE Score 4   Walk 150 Feet   Type of Assistance Needed Incidental touching   Comment cg level and unlevel surfaces with RW; increased visual/tactile cues for sequence/direction   Walk 150 Feet CARE Score 4   Walking 10 Feet on Uneven Surfaces   Type of Assistance Needed Incidental touching   Comment cg level and unlevel surfaces with RW; increased visual/tactile cues for  sequence/direction   Walking 10 Feet on Uneven Surfaces CARE Score 4   Ambulation   Primary Mode of Locomotion Prior to Admission Walk   Distance Walked (feet) 206 ft  (184' 206' 137')   Assist Device Roller Walker   Gait Pattern Inconsistant Maria Victoria;Slow Maria Victoria;Decreased foot clearance;Narrow ILIANA;Forward Flexion   Limitations Noted In Balance;Coordination;Device Management;Endurance;Posture;Safety;Strength   Provided Assistance with: Balance;Direction   Walk Assist Level Contact Guard   Findings cg level and unlevel surfaces with RW; increased visual/tactile cues for sequence/direction   Does the patient walk? 2. Yes   Therapeutic Interventions   Strengthening supine and seated ther ex   Balance gait and transfer training   Assessment   Treatment Assessment Patient agreeable to therapy session.   No pain reported.    Continues to requires increased visual/tactile cues for sequence/technique/direction for all tasks.  Remains dependent for Finley management.   Completed ther ex for general LE strengthening; gait and transfer training focusing on sequence and technique for improved balance and safety with functional mobility using walker.   PT Barriers   Physical Impairment Decreased strength;Decreased range of motion;Decreased endurance;Impaired balance;Decreased mobility;Decreased coordination;Impaired judgement;Decreased safety awareness;Impaired hearing   Functional Limitation Wheelchair management;Walking;Transfers;Standing;Stair negotiation;Ramp negotiation;Car transfers   Plan   Treatment/Interventions Functional transfer training;LE strengthening/ROM;Therapeutic exercise;Bed mobility;Gait training   Progress Progressing toward goals   PT Therapy Minutes   PT Time In 0642   PT Time Out 0812   PT Total Time (minutes) 90   PT Mode of treatment - Individual (minutes) 90   PT Mode of treatment - Concurrent (minutes) 0   PT Mode of treatment - Group (minutes) 0   PT Mode of treatment - Co-treat (minutes) 0   PT Mode  of Treatment - Total time(minutes) 90 minutes   PT Cumulative Minutes 1912   Therapy Time missed   Time missed? No

## 2024-03-19 NOTE — PROGRESS NOTES
Progress Note - Thomas Chase 83 y.o. male MRN: [id number]    Unit/Bed#: [facility] 213-02 Encounter: [id number]        Subjective:   Patient seen and examined at bedside after reviewing the chart and discussing the case with the caring staff.      Patient examined at bedside.  Patient has no acute symptoms.  Patient reportedly had more coughing episodes with sips of his thickened liquid.    Patient's labs from 3/18/2024 were reviewed and CMP was found to be within normal limits except continued elevated but improved LFTs including alkaline phosphatase 109 and total bilirubin 3.2.  Patient's CBC was within normal limits.    Physical Exam   Vitals: Blood pressure 111/55, pulse 80, temperature 98.6 °F (37 °C), temperature source Temporal, resp. rate 19, height 6' (1.829 m), weight 60.4 kg (133 lb 2.5 oz), SpO2 94%.,Body mass index is 18.06 kg/m².  Constitutional: Patient in no acute distress.  HEENT: PERR, EOMI, MMM.  Cardiovascular: Normal rate and regular rhythm.    Pulmonary/Chest: Effort normal and breath sounds normal.   Abdomen: Soft, + BS, NT.    Assessment/Plan:  Thomas Chase is a(n) 83 y.o. year old male with left SDH and right SAH.     1.  Cardiac with hx of HTN, HLD/hypotension.  On pravastatin 40 mg daily (simvastatin nonformulary).  Noted to have low BP 3/10/24.   Patient's orthostatic vitals did not show significant drop.  Received IV fluids and started on midodrine 2.5 mg 3 times daily 3/18/2024.  BP now improved, continue to monitor.  UA was ordered to rule out UTI as patient's CRP and sed rate were slightly elevated.  [place] texted on call ID regarding urine culture who recommended no treatment.  Patient asymptomatic, does not have any urinary symptoms, flank pain.    2.  Allergic rhinitis.  Patient is on loratadine 10 mg daily.  3.  Depression/anxiety.  Patient is on Zoloft 25 mg daily.  4.  Insomnia.  Patient is on melatonin 6 mg at bedtime.  5.  Thrush.  Discontinue nystatin liquid swish and swallow  3/11/2024 and started on clotrimazole lozenges 3/11/2024.  If patient is unable to do this will restart nystatin liquid swish and swallow.  6.  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  Patient is mainly nonverbal.  7.  Urinary retention/enterococcal UTI.  Urinary retention protocol.  Flomax decreased to 0.4 mg daily 3/12/2024 due to possible cause of hypotension.  Finley catheter placed 2/22/24, removed on 2/28/24, placed again 3/1/24, removed again 3/7/24 and reinserted 3/8/24.  I will treat the patient with Levaquin 500 mg daily 3/17/2024 for 5 days.  8.  Dysphagia.  Speech therapy following.  Dysphagia 1 puureed with nectar thick liquid.  Video swallow study done on 3/11/2024.  9.  Vitamin B12 deficiency.  Patient started on vitamin B12 500 mcg daily.    10.  Pain and bowel management.  As per physiatrist.  11.  Intracranial hemorrhage.   Patient will be receiving intensive PT OT ST as per physiatrist.    Anticipated discharge date:  TBD most likely to SNF.  PCP:  RONY Diaz

## 2024-03-19 NOTE — PROGRESS NOTES
03/19/24 1235   Pain Assessment   Pain Assessment Tool 0-10   Pain Score No Pain   Restrictions/Precautions   Precautions Aspiration;Bed/chair alarms;Cognitive;Fall Risk;Finley;Hard of hearing;Impulsive;Supervision on toilet/commode   Cognition   Overall Cognitive Status Impaired   Arousal/Participation Alert;Responsive;Cooperative   Attention Attends with cues to redirect   Orientation Level Oriented to person;Oriented to place   Memory Decreased short term memory;Decreased recall of recent events;Decreased recall of precautions   Following Commands Follows one step commands with increased time or repetition   Sit to Stand   Type of Assistance Needed Supervision   Comment close S STS with RW; increased visual/tactile cues for sequence/hand placement   Sit to Stand CARE Score 4   Bed-Chair Transfer   Type of Assistance Needed Incidental touching   Comment g SPT with RW; increased visual/tactile cues for sequence/technique   Chair/Bed-to-Chair Transfer CARE Score 4   Walk 10 Feet   Type of Assistance Needed Incidental touching   Comment cg level and unlevel surfaces with RW; increased visual/tactile cues for sequence/direction   Walk 10 Feet CARE Score 4   Walk 50 Feet with Two Turns   Type of Assistance Needed Incidental touching   Comment cg level and unlevel surfaces with RW; increased visual/tactile cues for sequence/direction   Walk 50 Feet with Two Turns CARE Score 4   Walk 150 Feet   Type of Assistance Needed Incidental touching   Comment cg level and unlevel surfaces with RW; increased visual/tactile cues for sequence/direction   Walk 150 Feet CARE Score 4   Walking 10 Feet on Uneven Surfaces   Type of Assistance Needed Incidental touching   Comment cg level and unlevel surfaces with RW; increased visual/tactile cues for sequence/direction   Walking 10 Feet on Uneven Surfaces CARE Score 4   Ambulation   Primary Mode of Locomotion Prior to Admission Walk   Distance Walked (feet) 156 ft  (153' 156' 137')    Assist Device Roller Walker   Gait Pattern Inconsistant Maria Victoria;Slow Maria Victoria;Decreased foot clearance;Narrow ILIANA;Forward Flexion   Limitations Noted In Balance;Coordination;Device Management;Endurance;Posture;Safety;Strength   Provided Assistance with: Balance;Direction   Walk Assist Level Contact Guard   Findings cg level and unlevel surfaces with RW; increased visual/tactile cues for sequence/direction   Does the patient walk? 2. Yes   Curb or Single Stair   Style negotiated Single stair   Type of Assistance Needed Incidental touching   Comment cg with 2 handrails and increased visual/tactile cues for sequence/direction   1 Step (Curb) CARE Score 4   4 Steps   Type of Assistance Needed Incidental touching   Comment cg with 2 handrails and increased visual/tactile cues for sequence/direction   4 Steps CARE Score 4   12 Steps   Type of Assistance Needed Incidental touching   Comment cg with 2 handrails and increased visual/tactile cues for sequence/direction   12 Steps CARE Score 4   Stairs   Type Stairs;Ramp   # of Steps 14   Weight Bearing Precautions WBAT;Fall Risk   Assist Devices Bilateral Rail;Roller Walker   Findings cg with 2 handrails and increased visual/tactile cues for sequence/direction; cg ramp with RW and increased visual/tactile cues for sequence/direction.   Therapeutic Interventions   Balance gait and transfer training   Other ramp and stair negotiation   Assessment   Treatment Assessment Patient agreeable to therapy session.  Remains pain free.   Continues to require increased visual/tactile cues for sequence/technique/direction to complete all tasks.    Dependent Finley management.   Patient requiring decreased cues to push up from chair to stand with RW.  Completed gait and transfer training focusing on sequence and technique for improved balance and safety with functional mobility using walker.  Negotiated ramp with RW cg; steps with 2 HRs CG; both with increased visual/tactile cues.   PT  Barriers   Physical Impairment Decreased strength;Decreased range of motion;Decreased endurance;Impaired balance;Decreased mobility;Decreased coordination;Impaired judgement;Decreased safety awareness;Impaired hearing   Functional Limitation Wheelchair management;Walking;Transfers;Standing;Stair negotiation;Ramp negotiation;Car transfers   Plan   Treatment/Interventions Functional transfer training;Elevations;Gait training   Progress Progressing toward goals   PT Therapy Minutes   PT Time In 1235   PT Time Out 1310   PT Total Time (minutes) 35   PT Mode of treatment - Individual (minutes) 35   PT Mode of treatment - Concurrent (minutes) 0   PT Mode of treatment - Group (minutes) 0   PT Mode of treatment - Co-treat (minutes) 0   PT Mode of Treatment - Total time(minutes) 35 minutes   PT Cumulative Minutes 1947   Therapy Time missed   Time missed? No

## 2024-03-20 PROCEDURE — 97530 THERAPEUTIC ACTIVITIES: CPT

## 2024-03-20 PROCEDURE — 97537 COMMUNITY/WORK REINTEGRATION: CPT

## 2024-03-20 PROCEDURE — 97116 GAIT TRAINING THERAPY: CPT

## 2024-03-20 PROCEDURE — 97129 THER IVNTJ 1ST 15 MIN: CPT

## 2024-03-20 PROCEDURE — 97110 THERAPEUTIC EXERCISES: CPT

## 2024-03-20 PROCEDURE — 92526 ORAL FUNCTION THERAPY: CPT | Performed by: NURSE PRACTITIONER

## 2024-03-20 PROCEDURE — 97535 SELF CARE MNGMENT TRAINING: CPT

## 2024-03-20 PROCEDURE — 99233 SBSQ HOSP IP/OBS HIGH 50: CPT | Performed by: PHYSICAL MEDICINE & REHABILITATION

## 2024-03-20 RX ADMIN — CLOTRIMAZOLE 10 MG: 10 LOZENGE ORAL; TOPICAL at 12:04

## 2024-03-20 RX ADMIN — ACETAMINOPHEN 650 MG: 325 TABLET ORAL at 21:03

## 2024-03-20 RX ADMIN — SENNOSIDES 8.6 MG: 8.6 TABLET, FILM COATED ORAL at 21:03

## 2024-03-20 RX ADMIN — TAMSULOSIN HYDROCHLORIDE 0.4 MG: 0.4 CAPSULE ORAL at 16:42

## 2024-03-20 RX ADMIN — CLOTRIMAZOLE 10 MG: 10 LOZENGE ORAL; TOPICAL at 07:34

## 2024-03-20 RX ADMIN — ACETAMINOPHEN 650 MG: 325 TABLET ORAL at 14:35

## 2024-03-20 RX ADMIN — CYANOCOBALAMIN TAB 500 MCG 500 MCG: 500 TAB at 08:05

## 2024-03-20 RX ADMIN — MIDODRINE HYDROCHLORIDE 2.5 MG: 2.5 TABLET ORAL at 12:03

## 2024-03-20 RX ADMIN — Medication 1 TABLET: at 08:05

## 2024-03-20 RX ADMIN — CLOTRIMAZOLE 10 MG: 10 LOZENGE ORAL; TOPICAL at 14:06

## 2024-03-20 RX ADMIN — ENOXAPARIN SODIUM 40 MG: 40 INJECTION SUBCUTANEOUS at 08:05

## 2024-03-20 RX ADMIN — MIDODRINE HYDROCHLORIDE 2.5 MG: 2.5 TABLET ORAL at 07:34

## 2024-03-20 RX ADMIN — SERTRALINE HYDROCHLORIDE 25 MG: 25 TABLET ORAL at 08:05

## 2024-03-20 RX ADMIN — CLOTRIMAZOLE 10 MG: 10 LOZENGE ORAL; TOPICAL at 21:03

## 2024-03-20 RX ADMIN — LORATADINE 10 MG: 10 TABLET ORAL at 08:05

## 2024-03-20 RX ADMIN — Medication 6 MG: at 21:03

## 2024-03-20 RX ADMIN — LEVOFLOXACIN 500 MG: 250 TABLET, FILM COATED ORAL at 14:42

## 2024-03-20 RX ADMIN — PRAVASTATIN SODIUM 40 MG: 40 TABLET ORAL at 16:42

## 2024-03-20 RX ADMIN — CLOTRIMAZOLE 10 MG: 10 LOZENGE ORAL; TOPICAL at 17:21

## 2024-03-20 RX ADMIN — MIDODRINE HYDROCHLORIDE 2.5 MG: 2.5 TABLET ORAL at 16:42

## 2024-03-20 NOTE — PROGRESS NOTES
03/20/24 0700   Pain Assessment   Pain Assessment Tool 0-10   Pain Score No Pain   Restrictions/Precautions   Precautions Aspiration;Bed/chair alarms;Cognitive;Fall Risk;Finley;Hard of hearing   Weight Bearing Restrictions No   ROM Restrictions No   Eating   Type of Assistance Needed Supervision   Physical Assistance Level No physical assistance   Comment cueing required to increase intake, pt responded positively to countdown cues for each food item after reporting not wanting any more in order to increase intake; coughing after >50% of bites with cues to double-swallow   Eating CARE Score 4   Oral Hygiene   Type of Assistance Needed Supervision   Physical Assistance Level No physical assistance   Comment cueing for thoroughness   Oral Hygiene CARE Score 4   Grooming   Able To Comb/Brush Hair;Wash/Dry Face;Brush/Clean Teeth   Limitation Noted In Safety;Problem Solving;Sequencing   Shower/Bathe Self   Type of Assistance Needed Physical assistance   Physical Assistance Level 26%-50%   Comment assist for bilat lower LE, buttocks; Finley care completed by OT after shower   Shower/Bathe Self CARE Score 3   Bathing   Assessed Bath Style Shower   Anticipated D/C Bath Style Shower;Sponge Bath   Able to Gather/Transport No   Able to Adjust Water Temperature No   Able to Wash/Rinse/Dry (body part) Left Arm;Right Arm;L Upper Leg;R Upper Leg;Chest;Abdomen   Limitations Noted in Balance;Endurance;Problem Solving;ROM;Safety;Sequencing   Positioning Seated;Standing   Adaptive Equipment Shower Bars;Shower Seat;Hand Held Shower   Findings  physical cues required occasionally for sequencing   Tub/Shower Transfer   Limitations Noted In Balance;Endurance;Problem Solving;Safety;Sequencing;UE Strength;LE Strength   Adaptive Equipment Grab Bars;Seat with out Back   Assessed Shower   Findings CG with physical and visual cues for hand placement   Upper Body Dressing   Type of Assistance Needed Physical assistance   Physical Assistance  Level 51%-75%   Comment assist to doff and don flannel shirts, doff and don t-shirts over head   Upper Body Dressing CARE Score 2   Lower Body Dressing   Type of Assistance Needed Physical assistance   Physical Assistance Level 76% or more   Comment able to assist in pushing pants down when standing, pulling clothing up to knees when seated   Lower Body Dressing CARE Score 2   Putting On/Taking Off Footwear   Type of Assistance Needed Physical assistance   Physical Assistance Level 76% or more   Comment able to present bilat feet to OT to doff/don footwear   Putting On/Taking Off Footwear CARE Score 2   Dressing/Undressing Clothing   Remove UB Clothes Pullover Shirt;Jacket   Don UB Clothes Pullover Shirt;Jacket   Remove LB Clothes Pants;Undergarment;Socks   Don LB Clothes Undergarment;Pants;Socks;TEDs   Limitations Noted In Balance;Endurance;Problem Solving;Safety;Sequencing;Strength;ROM   Positioning Supported Sit   Lying to Sitting on Side of Bed   Type of Assistance Needed Supervision   Physical Assistance Level No physical assistance   Lying to Sitting on Side of Bed CARE Score 4   Sit to Stand   Type of Assistance Needed Supervision   Physical Assistance Level No physical assistance   Sit to Stand CARE Score 4   Cognition   Overall Cognitive Status Impaired   Arousal/Participation Alert;Responsive;Cooperative   Attention Attends with cues to redirect   Orientation Level Oriented to person;Oriented to place   Memory Decreased short term memory;Decreased recall of recent events;Decreased recall of precautions   Following Commands Follows one step commands with increased time or repetition   Assessment   Treatment Assessment Pt agreeable to OT session this AM. Received lying supine in bed. ADL session completed; current LOF and details listed in respective sections. Pt is overall maxA LB dressing, modA UB dressing, [place]an showering, supervision oral care; fxl transfers overall supervision with visual cues for hand  placement and safety. Pt completed shower overall successfully with initial orientation to shower set up and routine; no increase in cues or physical assist required. Pt required supervision for breakfast tray at end of session. Pt with increase amount of coughing with cues for double-swallow. Pt's barriers to d/c include decreased strength throughout, decreased balance, impaired hearing, impaired cognition including safety awareness, problem solving, attention, comprehension, and expression, and decreased activity tolerance; all affect independence in self care and fxl transfers. Pt would benefit from continued skilled OT services in order to address listed barriers and prepare for safe d/c.   Prognosis Fair   Problem List Decreased strength;Decreased range of motion;Decreased endurance;Impaired balance;Decreased coordination;Decreased cognition;Decreased safety awareness;Impaired hearing   Plan   Treatment/Interventions ADL retraining;Functional transfer training;LE strengthening/ROM;Therapeutic exercise;Endurance training;Cognitive reorientation;Patient/family training;Equipment eval/education;Compensatory technique education;OT   Progress Progressing toward goals   OT Therapy Minutes   OT Time In 0700   OT Time Out 0830   OT Total Time (minutes) 90   OT Mode of treatment - Individual (minutes) 90

## 2024-03-20 NOTE — PROGRESS NOTES
03/20/24 1410   Pain Assessment   Pain Assessment Tool 0-10   Pain Score 6   Pain Location/Orientation Orientation: Lower;Location: Back   Restrictions/Precautions   Precautions Aspiration;Bed/chair alarms;Cognitive;Fall Risk;Hard of hearing;Finley   Weight Bearing Restrictions No   ROM Restrictions No   Sit to Lying   Type of Assistance Needed Supervision   Physical Assistance Level No physical assistance   Sit to Lying CARE Score 4   Sit to Stand   Type of Assistance Needed Supervision   Physical Assistance Level No physical assistance   Sit to Stand CARE Score 4   Bed-Chair Transfer   Type of Assistance Needed Incidental touching   Comment CG   Chair/Bed-to-Chair Transfer CARE Score 4   Exercise Tools   Other Exercise Tool 1 hung clothespins on rods using R hand, prompted by OT for each color and completed without error; occasional assist from OT to correctly align pins in pinch    Other Exercise Tool 2 card matching activity placing cards in pockets with R hand, pt also verbalized the number and suit of each card when placing; rest break taken once 2* increasing back discomfort   Cognition   Overall Cognitive Status Impaired   Arousal/Participation Alert;Responsive;Cooperative   Attention Attends with cues to redirect   Orientation Level Oriented to person;Oriented to place   Memory Decreased short term memory;Decreased recall of recent events;Decreased recall of precautions   Following Commands Follows one step commands with increased time or repetition   Assessment   Treatment Assessment Pt seen for OT session this PM focusing on fine motor coordination/intrinsic hand strength, cognition, and activity tolerance; details in respective sections. Pt continues with visual cues throughout to remain attending to tasks, however more attentive and responsive this session both sporadically and when prompted by OT. Returned to supine at end of session. Pt's wife present during session and provided good support and  encouragement. Pt's barriers to d/c include decreased strength throughout, decreased balance, impaired hearing, impaired cognition including safety awareness, problem solving, attention, comprehension, and expression, and decreased activity tolerance; all affect independence in self care and fxl transfers. Pt would benefit from continued skilled OT services in order to address listed barriers and prepare for safe d/c.   Prognosis Fair   Problem List Decreased strength;Decreased range of motion;Decreased endurance;Impaired balance;Decreased coordination;Decreased cognition;Decreased safety awareness;Impaired hearing   Plan   Treatment/Interventions ADL retraining;Functional transfer training;LE strengthening/ROM;Endurance training;Therapeutic exercise;Cognitive reorientation;Patient/family training;Equipment eval/education;Compensatory technique education;OT   Progress Progressing toward goals   OT Therapy Minutes   OT Time In 1410   OT Time Out 1457   OT Total Time (minutes) 47   OT Mode of treatment - Individual (minutes) 47

## 2024-03-20 NOTE — PROGRESS NOTES
03/20/24 1230   Pain Assessment   Pain Assessment Tool 0-10   Pain Score No Pain   Restrictions/Precautions   Precautions Aspiration;Bed/chair alarms;Cognitive;Fall Risk;Finley;Hard of hearing;Impulsive;Supervision on toilet/commode   Cognition   Overall Cognitive Status Impaired   Arousal/Participation Alert;Responsive;Cooperative   Attention Attends with cues to redirect   Orientation Level Oriented to person;Oriented to place   Memory Decreased short term memory;Decreased recall of recent events;Decreased recall of precautions   Following Commands Follows one step commands with increased time or repetition   Sit to Stand   Type of Assistance Needed Supervision   Comment close S with RW; increased visual/tactile cues for sequence/technique/hand placement   Sit to Stand CARE Score 4   Bed-Chair Transfer   Type of Assistance Needed Incidental touching   Comment cg SPT with RW; increased visual/tactile cues for sequence/technique   Chair/Bed-to-Chair Transfer CARE Score 4   Walk 10 Feet   Type of Assistance Needed Incidental touching   Comment cg level and unlevel surfaces with RW; increased visual/tactile cues for sequence/direction   Walk 10 Feet CARE Score 4   Walk 50 Feet with Two Turns   Type of Assistance Needed Incidental touching   Comment cg level and unlevel surfaces with RW; increased visual/tactile cues for sequence/direction   Walk 50 Feet with Two Turns CARE Score 4   Walk 150 Feet   Type of Assistance Needed Incidental touching   Comment cg level and unlevel surfaces with RW; increased visual/tactile cues for sequence/direction   Walk 150 Feet CARE Score 4   Walking 10 Feet on Uneven Surfaces   Type of Assistance Needed Incidental touching   Comment cg level and unlevel surfaces with RW; increased visual/tactile cues for sequence/direction   Walking 10 Feet on Uneven Surfaces CARE Score 4   Ambulation   Primary Mode of Locomotion Prior to Admission Walk   Distance Walked (feet) 154 ft  (154')    Assist Device Roller Walker   Gait Pattern Inconsistant Maria Victoria;Slow Maria Victoria;Decreased foot clearance;Narrow ILIANA;Forward Flexion   Limitations Noted In Balance;Coordination;Device Management;Endurance;Posture;Safety;Strength   Provided Assistance with: Balance;Direction   Walk Assist Level Contact Guard   Findings cg level and unlevel surfaces with RW; increased visual/tactile cues for sequence/direction   Does the patient walk? 2. Yes   Therapeutic Interventions   Strengthening seated ther ex   Balance gait and transfer training   Assessment   Treatment Assessment Patient agreeable to therapy session.   No pain reported.  Increased visual/tactile cues for all tasks.  Completed ther ex for general LE strengthening; gait and transfer training focusing on sequence and technique for improved balance and safety with functional mobility using walker.   PT Barriers   Physical Impairment Decreased strength;Decreased range of motion;Decreased endurance;Impaired balance;Decreased mobility;Decreased coordination;Impaired judgement;Decreased safety awareness;Impaired hearing   Functional Limitation Wheelchair management;Walking;Transfers;Standing;Stair negotiation;Ramp negotiation;Car transfers   Plan   Treatment/Interventions Functional transfer training;LE strengthening/ROM;Therapeutic exercise;Gait training   Progress Progressing toward goals   PT Therapy Minutes   PT Time In 1230   PT Time Out 1310   PT Total Time (minutes) 40   PT Mode of treatment - Individual (minutes) 30   PT Mode of treatment - Concurrent (minutes) 10   PT Mode of treatment - Group (minutes) 0   PT Mode of treatment - Co-treat (minutes) 0   PT Mode of Treatment - Total time(minutes) 40 minutes   PT Cumulative Minutes 2046   Therapy Time missed   Time missed? No

## 2024-03-20 NOTE — PROGRESS NOTES
"   03/20/24 0916   Pain Assessment   Pain Assessment Tool 0-10   Pain Score No Pain   Pain Rating: FLACC (Rest) - Face 0   Pain Rating: FLACC (Rest) - Legs 0   Pain Rating: FLACC (Rest) - Activity 0   Pain Rating: FLACC (Rest) - Cry 0   Pain Rating: FLACC (Rest) - Consolability 0   Score: FLACC (Rest) 0   Restrictions/Precautions   Precautions Aspiration;Bed/chair alarms;Cognitive;Fall Risk;Finley;Hard of hearing;Supervision on toilet/commode   Comprehension   Assist Devices Hearing Aid   Comprehension (FIM) 4 - Understands basic info/conversation 75-90% of time   Expression   Expression (FIM) 3 - Expresses basic info/needs 50-74% of time   Social Interaction   Social Interaction (FIM) 5 - Interacts appropriately with others 90% of time   Problem Solving   Problem solving (FIM) 3 - Solves basic problmes 50-74% of time   Memory   Memory (FIM) 3 - Recognizes, recalls/performs 50-74%   Memory Skills   Orientation Level Oriented to person;Oriented to place  (unintelligible response for time and situation, cue provided - patient noded head yes)   Speech/Language/Cognition Assessmetn   Treatment Assessment Inconsistent direction following for swallow exercises. Simple 1-4 word phrases read aloud on white board but not beyond this. Further Swallow exercise complexity limited due to cognitive barrier.   Swallow Assessment   Swallow Treatment Assessment 4 oz water via single controlled sips via pinching the straw and removing by SLP cough x3. 4 oz nectar thick liquids via same method of reception with cough 1x. Increased coughing with breakfast trays observed by nursing this date and yesterday. It is important to note that patient does remain a very high aspiration risk with current diet especially due to the severity of pharyngeal residue and reduced airway closure. Pt demonstrated increased swallow delay as session progressed today required cue to \"swallow\" as patient without initiation and began trying to talk resulting in " significant cough. Fatigue with meals remains a concern as well, patient should be monitored closely due to risk of developing aspiration PNA.   SLP Therapy Minutes   SLP Time In 0916   SLP Time Out 0931   SLP Total Time (minutes) 15   SLP Mode of treatment - Individual (minutes) 15   SLP Mode of treatment - Concurrent (minutes) 0   SLP Mode of treatment - Group (minutes) 0   SLP Mode of treatment - Co-treat (minutes) 0   SLP Mode of Treatment - Total time(minutes) 15 minutes   SLP Cumulative Minutes 990

## 2024-03-20 NOTE — PROGRESS NOTES
Progress Note - Thomas Chase 83 y.o. male MRN: [id number]    Unit/Bed#: [facility] 213-02 Encounter: [id number]        Subjective:   Patient seen and examined at bedside after reviewing the chart and discussing the case with the caring staff.      Patient examined at bedside.  Patient has no acute symptoms.     Patient is a possible discharge to SNF tomorrow.     Physical Exam   Vitals: Blood pressure 110/69, pulse 88, temperature 98.4 °F (36.9 °C), temperature source Temporal, resp. rate 17, height 6' (1.829 m), weight 60.4 kg (133 lb 2.5 oz), SpO2 93%.,Body mass index is 18.06 kg/m².  Constitutional: Patient in no acute distress.  HEENT: PERR, EOMI, MMM.  Cardiovascular: Normal rate and regular rhythm.    Pulmonary/Chest: Effort normal and breath sounds normal.   Abdomen: Soft, + BS, NT.    Assessment/Plan:  Thomas Chase is a(n) 83 y.o. year old male with left SDH and right SAH.     1.  Cardiac with hx of HTN, HLD/hypotension.  On pravastatin 40 mg daily (simvastatin nonformulary).  Noted to have low BP 3/10/24.   Patient's orthostatic vitals did not show significant drop.  Received IV fluids and started on midodrine 2.5 mg 3 times daily 3/18/2024.  BP now improved, continue to monitor.     2.  Allergic rhinitis.  Patient is on loratadine 10 mg daily.  3.  Depression/anxiety.  Patient is on Zoloft 25 mg daily.  4.  Insomnia.  Patient is on melatonin 6 mg at bedtime.  5.  Thrush.  Discontinue nystatin liquid swish and swallow 3/11/2024 and started on clotrimazole lozenges 3/11/2024.  If patient is unable to do this will restart nystatin liquid swish and swallow.  6.  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  Patient is mainly nonverbal.  7.  Urinary retention/enterococcal UTI.  Urinary retention protocol.  Flomax decreased to 0.4 mg daily 3/12/2024 due to possible cause of hypotension.  Finley catheter placed 2/22/24, removed on 2/28/24, placed again 3/1/24, removed again 3/7/24 and reinserted  3/8/24.    UA was ordered 3/12/24 to rule out UTI as patient was hypotensive with CRP and sed rate were slightly elevated.  [place] texted on call ID 3/15/24 regarding urine culture result who recommended no treatment at that time as patient asymptomatic without urinary symptoms or flank pain.  Patient had temp 100.7F later that evening and was started on Levaquin 500 mg daily x 5 days.  8.  Dysphagia.  Speech therapy following.  Dysphagia 1 puureed with nectar thick liquid.  Video swallow study done on 3/11/2024.  9.  Vitamin B12 deficiency.  Patient started on vitamin B12 500 mcg daily.    10.  Pain and bowel management.  As per physiatrist.  11.  Intracranial hemorrhage.   Patient will be receiving intensive PT OT ST as per physiatrist.    Anticipated discharge date:  TBD  PCP:  RONY Diaz    The patient was discussed with Dr. Paz and he is in agreement with the above note.

## 2024-03-20 NOTE — NURSING NOTE
Patient resting queitly in bed. Skin warm and dry. Respirations easy on room air.  Occassional moist Non-productive cough noted. HOB elevated 30 degrees.  Turned and repositioned q 2 hours. Finley catheter intact , patent and draining clear yellow urine. Continues with expressive aphasia but is able to say some words to verbalize needs .  Call bell in reach at bedside.

## 2024-03-20 NOTE — PROGRESS NOTES
03/20/24 0931   Pain Assessment   Pain Assessment Tool 0-10   Pain Score No Pain   Restrictions/Precautions   Precautions Aspiration;Bed/chair alarms;Cognitive;Fall Risk;Finley;Hard of hearing;Impulsive;Supervision on toilet/commode   Cognition   Overall Cognitive Status Impaired   Arousal/Participation Alert;Responsive;Cooperative   Attention Attends with cues to redirect   Orientation Level Oriented to person;Oriented to place   Memory Decreased short term memory;Decreased recall of recent events;Decreased recall of precautions   Following Commands Follows one step commands with increased time or repetition   Sit to Lying   Type of Assistance Needed Supervision   Comment bed rails; increased visual/tactile cues for sequence   Sit to Lying CARE Score 4   Lying to Sitting on Side of Bed   Type of Assistance Needed Supervision   Comment bed rails; increased visual/tactile cues for sequence   Lying to Sitting on Side of Bed CARE Score 4   Sit to Stand   Type of Assistance Needed Supervision   Comment close S with RW; increased visual/tactile cues for sequence/technique/hand placement   Sit to Stand CARE Score 4   Bed-Chair Transfer   Type of Assistance Needed Incidental touching   Comment cg SPT with RW; increased visual/tactile cues for sequence/technique   Chair/Bed-to-Chair Transfer CARE Score 4   Walk 10 Feet   Type of Assistance Needed Incidental touching   Comment cg level and unlevel surfaces with RW; increased visual/tactile cues for sequence/direction   Walk 10 Feet CARE Score 4   Walk 50 Feet with Two Turns   Type of Assistance Needed Incidental touching   Comment cg level and unlevel surfaces with RW; increased visual/tactile cues for sequence/direction   Walk 50 Feet with Two Turns CARE Score 4   Walk 150 Feet   Type of Assistance Needed Incidental touching   Comment cg level and unlevel surfaces with RW; increased visual/tactile cues for sequence/direction   Walk 150 Feet CARE Score 4   Walking 10 Feet  on Uneven Surfaces   Type of Assistance Needed Incidental touching   Comment cg level and unlevel surfaces with RW; increased visual/tactile cues for sequence/direction   Walking 10 Feet on Uneven Surfaces CARE Score 4   Ambulation   Primary Mode of Locomotion Prior to Admission Walk   Distance Walked (feet) 197 ft  (197' 132' 139')   Assist Device Roller Walker   Gait Pattern Inconsistant Maria Victoria;Slow Maria Victoria;Decreased foot clearance;Narrow ILIANA;Forward Flexion   Limitations Noted In Balance;Coordination;Device Management;Endurance;Posture;Safety;Strength   Provided Assistance with: Balance;Direction   Walk Assist Level Contact Guard   Findings cg level and unlevel surfaces with RW; increased visual/tactile cues for sequence/direction   Does the patient walk? 2. Yes   Curb or Single Stair   Style negotiated Single stair   Type of Assistance Needed Incidental touching   Comment cg with 2 handrails and increased visual/tactile cues for sequence/direction   1 Step (Curb) CARE Score 4   4 Steps   Type of Assistance Needed Incidental touching   Comment cg with 2 handrails and increased visual/tactile cues for sequence/direction   4 Steps CARE Score 4   12 Steps   Type of Assistance Needed Incidental touching   Comment cg with 2 handrails and increased visual/tactile cues for sequence/direction   12 Steps CARE Score 4   Stairs   Type Stairs;Ramp   # of Steps 14   Weight Bearing Precautions WBAT;Fall Risk   Assist Devices Bilateral Rail;Roller Walker   Findings cg with 2 handrails and increased visual/tactile cues for sequence/direction; cg ramp with RW and increased visual/tactile cues for sequence/direction   Therapeutic Interventions   Strengthening supine ther ex   Balance gait and transfer training   Other ramp and stair negotiation   Assessment   Treatment Assessment Patient agreeable to therapy session.   No pain reported.   Continues to require increased visual/tactile cues for sequence/technique/direction to  safely complete all tasks.  Remains dependent for Finley management.  Completed ther ex for general LE strengthening; gait and transfer training focusing on sequence and technique for improved balance and safety with functional mobility using walker.  Negotiated ramp CG and steps with 2 HR CG; both with increased visual/tactile cues.  Patient appropriate for concurrent therapy to increase motivation and encouragement among pts with similar deficits while completing therapy session.   PT Barriers   Physical Impairment Decreased strength;Decreased range of motion;Decreased endurance;Impaired balance;Decreased mobility;Decreased coordination;Impaired judgement;Decreased safety awareness;Impaired hearing   Functional Limitation Wheelchair management;Walking;Transfers;Standing;Stair negotiation;Ramp negotiation;Car transfers   Plan   Treatment/Interventions Functional transfer training;LE strengthening/ROM;Elevations;Therapeutic exercise;Bed mobility;Gait training   Progress Progressing toward goals   PT Therapy Minutes   PT Time In 0931   PT Time Out 1030   PT Total Time (minutes) 59   PT Mode of treatment - Individual (minutes) 32   PT Mode of treatment - Concurrent (minutes) 27   PT Mode of treatment - Group (minutes) 0   PT Mode of treatment - Co-treat (minutes) 0   PT Mode of Treatment - Total time(minutes) 59 minutes   PT Cumulative Minutes 2006   Therapy Time missed   Time missed? No

## 2024-03-20 NOTE — NURSING NOTE
Allevyns changed to bilateral heels and sacrum today as preventative as ordered.  Pt for neurology virtual appt tomorrow at 1:00 pm then possible transfer to [facility].  Tolerating therapy.   Wife visiting at this time.

## 2024-03-20 NOTE — PROGRESS NOTES
PM&R PROGRESS NOTE:  Thomas Chase 83 y.o. male MRN: [id number]  Unit/Bed#: -02 Encounter: [id number]        **CHANGE IN REHAB DIAGNOSIS FROM PRE-ADMISSION SCREEN  Rehab Diagnosis: Impairment of mobility, safety, Activities of Daily Living (ADLs), and cognitive/communication skills due to Brain Dysfunction:  02.22  Traumatic, Closed Injury  Etiologic Diagnosis: L SDH, R SAH, L anterior hypodensity lateral temporal lobe    HPI: Thomas Chase is a 83 y.o. 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  Unclear if patient had head trauma.  CT head showing a 3 mm left frontal subdural hemorrhage.  CT lumbar spine showing DJD.  Patient found to have dysphagia and was seen by speech therapy.  VFSS on 2/15/2024 cleared him for a puréed diet with thin liquids.  Patient was also started by psychiatry for depression and started on Zoloft 25 mg daily.  Patient sustained a rapid response and a unwitnessed fall with head trauma notable bump on head on 2/15/2024.  CT head showing a new small volume acute subarachnoid hemorrhage in the left frontal opercular region as well as new nondisplaced fracture of the zygomatic arch with soft tissue contusion, stable 3 mm subdural hemorrhage seen previously.  Patient transferred to [facility] for further evaluation.  Patient seen by neurology and neurosurgery.  CTA head and neck showing a left anterior hypodensity in the left anterior lateral temporal lobe with 2 punctate hyperdensities.  These represented possible nonhemorrhagic contusions or venous infarcts.  Less likely to represent ischemia or neoplasm.  Follow-up imaging recommended with CT head in 2-3 weeks.  CT  venogram showing patent dural venous sinuses.  Patient was cleared for DVT prophylaxis and started on Keppra for seizure prophylaxis.    Medical course complicated by suspected UTI based on UA and symptoms.  Patient was treated with antibiotics x 3 days.  After medical stabilization, patient found to have acute functional deficits from his baseline in mobility, self-care, speech swallow cognition therefore admitted to [facility] for acute inpatient rehabilitation.    SUBJECTIVE:  Patient seen briefly this morning.  Per nursing and SLP more coughing today with meals.  Will monitor.  No other symptoms.  No new issues.  SNF bed possible tomorrow.       ASSESSMENT: Stable, progressing      PLAN:    Rehabilitation  Functional deficits: Aphasia, dysphagia, impaired cognition, impaired balance, impulsivity, poor safety awareness impaired mobility, self care    Continue current rehabilitation plan of care to maximize function.  I personally attended, reviewed, and discussed medical and functional updates in team conference today.  Please refer to advance care planning note for details.    Expected Discharge: SNF bed possible tomorrow at [facility]    Current Function:  Physical Therapy Occupational Therapy Speech Therapy   Weight Bearing Status: Weight Bearing as Tolerated  Transfers: Incidental Touching, Supervision  Bed Mobility: Supervision  Amulation Distance (ft): 206 feet  Ambulation: Incidental Touching  Assistive Device for Ambulation: Roller Walker  Wheelchair Mobility Distance: 133 ft  Wheelchair Mobility: Maximum Assistance (max visual and tactile cues)  Number of Stairs: 14  Assistive Device for Stairs: Bilateral Hand Rails  Stair Assistance: Incidental Touching  Ramp: Incidental Touching  Assistive Device for Ramp: Roller Walker  Discharge Recommendations: Skilled Nursing Facility  UT Home with:: 24 Hour Assisteance, Family Support, First Floor Setup, Home Physical Therapy (vs alternate  placement)   Eating: Supervision  Grooming: Supervision  Bathing: Minimal Assistance  Bathing: Minimal Assistance  Upper Body Dressing: Minimal Assistance  Lower Body Dressing: Maximum Assistance  Toileting: Maximum Assistance  Tub/Shower Transfer: Incidental Touching  Toilet Transfer: Incidental Touching  Cognition: Exceptions to WNL  Cognition: Decreased Memory, Decreased Executive Functions, Decreased Attention, Decreased Comprehension, Decreased Safety  Orientation: Person, Place   Mode of Communication: Non-verbal  Speech/Language: Expressive Aphasia  Cognition: Exceptions to WNL  Cognition: Decreased Memory, Decreased Executive Functions, Decreased Attention, Decreased Comprehension, Decreased Safety  Orientation: Person, Place (unintelligible response for time and situation.)  Swallowing: Exceptions to WNL  Swallowing: Oral Dysphagia, Pharyngeal Dysphagia, Aspiration Risk  Diet Recommendations: Level 1/Ambrosio Qiu Thick  Discharge Recommendations:  (home vs. SNF)  DC Home with:: 24 Hour Supervision, Family Support (continued speech therapy services)       DVT prophylaxis  Continue Lovenox      * Intracranial hemorrhage (HCC)  Assessment & Plan  Initial presentation on 2/13/2024 with increased weakness in the legs, mental status changes for several days  CT head showing a 3 mm left frontal subdural hemorrhage  Rapid response 2/15/2024 with fall and head trauma  CT head showing a new small volume acute subarachnoid hemorrhage in the left frontal opercular region and new nondisplaced fracture of the zygomatic arch with soft tissue contusion.    Transferred to [facility]  CTA head and neck and and repeat CT head showing a left anterior hypodensity in the left anterior lateral temporal lobe with 2 punctate hyperdensities.  Differential diagnosis nonhemorrhagic contusions or venous infarcts.  Patient seen by neurology and neurosurgery  Conservative management  Cleared for DVT prophylaxis  Started on Keppra for seizure  prophylaxis  Repeat CT head scheduled on Monday, 3/4/2024 -personally reviewed  Radiology read as follows:  Improving left anterior temporal lobe contusions with resolving edema. Stable subacute subdural hemorrhage along the anterior left frontal convexity. Resolved subarachnoid and intraventricular hemorrhages. Unchanged minimal hyperdensity beneath the left craniotomy flap. Stable right parafalcine subdural hygroma. There is no new intra or extra-axial hemorrhage.  Follow-up with neurosurgery on 3/6/2024 virtually completed.  Recommendations as follows: Neurosurgery will sign off, patient will follow-up in 2 weeks with repeat CT head   CTH 3/16/24: No acute intracranial abnormality.Stable thin chronic left frontal subdural collection stable since 11/10/2023. Evaluation of the left temporal lobe is limited due to artifact from the cochlear implant but there may be mild developing encephalomalacia related to prior contusion.  Follow-up with neurosurgery and neurology in outpatient clinic (appointment is 3/21/24 with neurology in 3/22/2024 with neurosurgery)    Behavior safety risk  Assessment & Plan  Patient demonstrating mild impulsivity in acute care  Fall x 2 in acute care  Falls at home in the past    Safety behavior plan going well and ARC.  No further impulsivity  Nursing and staff to provide close supervision near nursing station  Patient placed on 4 side rails (not as a restraint, patient and wife preference)  Monitor sleep-wake cycle  Avoid overstimulation: 1 visitor at a time, low lights, low sounds      Hematuria  Assessment & Plan  Started 3/2 - 3/3/2024  Minor, and more likely related to irritation from Finley catheter  Irrigation as needed per urology  Asymptomatic  H&H stable    Headache  Assessment & Plan  Resolved  Continue Tylenol 650 mg PRN     Severe protein-calorie malnutrition (HCC)  Assessment & Plan  BMI 18.06  Nutrition following  Optimize oral intake  Supplements ordered  He is eating  % of meals  Hydration is variable  Maintain IV  IVF PRN  SLP working towards free water protocol    Dysphagia  Assessment & Plan  VFSS completed 2/15/2024  Cleared for a puréed diet with nectar thick liquids  SLP to follow while at [facility]  Repeat VFSS 3/11/24: Mild oral dysphagia, Moderate oral-pharyngeal dysphagia  Recommendations:  Diet: Dysphagia 1 pureed   Liquids: Nectar   Patient doing very well with oral food eating % of all of his meals.  Also gaining weight.  At times has difficulty with fluid intake due to nectar thick liquids and dysphagia.  Repeat swallow study recommended in 3-4 weeks  Supplement with IV fluids if needed  SLP also working towards a free water nectar thick protocol with oral care prior      Low BP  Assessment & Plan  Resolved  Continue midodrine 2.5 mg BID (3/12/24)    BP lower since 3/10/2024  Compression stockings and abdominal binder ordered  Internal medicine ordering IV fluid bolus.  I continued IVF x 1 L which was completed  BP continues to be very low especially in standing.   Labs reviewed - nothing to explain low BP        Urinary retention  Assessment & Plan  Finley catheter removed on [facility]  Trial of void underway  Mixed pattern  Unfortunately required multiple straight catheterizations  Finley catheter replaced 3/1/24 per urology  Flomax reduced to 0.4 mg daily due to hypotension   Trial of void 3/7/2024 - failed this  Finley re-inserted 3/8/24  Patient likely to require longer time prior to removal of Finley catheter -recommend removal in 2 weeks around 3/22/2024      Constipation due to neurogenic bowel  Assessment & Plan  Patient started on more aggressive bowel program yesterday  BM 3/17/24    Hearing loss  Assessment & Plan  Chronic hearing loss  Patient status post cochlear implant  MRI contraindicated    Hypercholesterolemia  Assessment & Plan  Continue simvastatin 20 mg daily (home dose)    UTI (urinary tract infection)-resolved as of 2/21/2024  Assessment &  Plan  Suspected UTI based on UA and symptoms in acute care  Completed 3 days IV antibiotics  In ARC:  UA was ordered by IM : equivocal - neg.   Culture was done by internal medicine.  Enterococcus faecalis  Likely colonized.  Asymptomatic.  Over the weekend, however, Dr. Paz did decide to initiate treatment with Levaquin x 5 days.          Appreciate IM consultants medical co-management.  Labs, medications, and imaging personally reviewed.      ROS:  A ten point review of systems was completed on 03/20/24 and pertinent positives are listed in subjective section. All other systems reviewed were negative.       OBJECTIVE:   /69 (BP Location: Right arm)   Pulse 88   Temp 98.4 °F (36.9 °C) (Temporal)   Resp 17   Ht 6' (1.829 m)   Wt 60.4 kg (133 lb 2.5 oz)   SpO2 93%   BMI 18.06 kg/m²     Physical Exam  Vitals and nursing note reviewed.   Constitutional:       General: He is not in acute distress.     Appearance: He is well-developed.   HENT:      Head: Normocephalic.      Nose: Nose normal.   Eyes:      Conjunctiva/sclera: Conjunctivae normal.   Pulmonary:      Effort: Pulmonary effort is normal.      Breath sounds: No wheezing.   Abdominal:      General: There is no distension.      Palpations: Abdomen is soft.   Genitourinary:     Comments: +Finley catheter - yellow urine  Musculoskeletal:         General: No swelling.      Cervical back: Neck supple.   Skin:     General: Skin is warm.   Neurological:      Mental Status: He is alert.      Comments: Aphasia   Psychiatric:         Mood and Affect: Mood normal.          Lab Results   Component Value Date    WBC 6.27 03/18/2024    HGB 11.9 (L) 03/18/2024    HCT 37.3 03/18/2024    MCV 98 03/18/2024     03/18/2024     Lab Results   Component Value Date    SODIUM 137 03/18/2024    K 4.3 03/18/2024     03/18/2024    CO2 28 03/18/2024    BUN 15 03/18/2024    CREATININE 0.56 (L) 03/18/2024    GLUC 82 03/18/2024    CALCIUM 8.5 03/18/2024     Lab  Results   Component Value Date    INR 1.02 06/02/2022    INR 1.10 05/29/2022    INR 1.04 05/28/2022    PROTIME 13.0 06/02/2022    PROTIME 13.7 05/29/2022    PROTIME 13.2 05/28/2022           Current Facility-Administered Medications:     acetaminophen (TYLENOL) tablet 650 mg, 650 mg, Oral, Q6H PRN, Daniel Clayton MD, 650 mg at 03/18/24 2326    bisacodyl (DULCOLAX) rectal suppository 10 mg, 10 mg, Rectal, Daily PRN, Darwin Paz MD    clotrimazole (MYCELEX) davy 10 mg, 10 mg, Oral, 5x Daily, Darwin Paz MD, 10 mg at 03/20/24 0734    cyanocobalamin (VITAMIN B-12) tablet 500 mcg, 500 mcg, Oral, Daily, Darwin Paz MD, 500 mcg at 03/20/24 0805    enoxaparin (LOVENOX) subcutaneous injection 40 mg, 40 mg, Subcutaneous, Q24H JEANNIE, Alexandrea Lugo MD, 40 mg at 03/20/24 0805    lactulose (CHRONULAC) oral solution 20 g, 20 g, Oral, BID PRN, Drawin Paz MD, 20 g at 03/05/24 2107    levofloxacin (LEVAQUIN) tablet 500 mg, 500 mg, Oral, Q24H, Darwin Paz MD, 500 mg at 03/19/24 1447    loratadine (CLARITIN) tablet 10 mg, 10 mg, Oral, Daily, Amada Epperson, PA-C, 10 mg at 03/20/24 0805    meclizine (ANTIVERT) tablet 25 mg, 25 mg, Oral, Q8H PRN, Amada LEE Dagnall, PA-C    melatonin tablet 6 mg, 6 mg, Oral, HS, Amada L Dagnall, PA-C, 6 mg at 03/19/24 2109    midodrine (PROAMATINE) tablet 2.5 mg, 2.5 mg, Oral, TID AC, Darwin Paz MD, 2.5 mg at 03/20/24 0734    multivitamin-minerals (CENTRUM) tablet 1 tablet, 1 tablet, Oral, Daily, Amada Epperson PA-C, 1 tablet at 03/20/24 0805    polyethylene glycol (MIRALAX) packet 17 g, 17 g, Oral, Daily PRN, Ashley Depadua, MD, 17 g at 03/05/24 1740    pravastatin (PRAVACHOL) tablet 40 mg, 40 mg, Oral, Daily With Dinner, Amada Epperson PA-C, 40 mg at 03/19/24 1721    senna (SENOKOT) tablet 8.6 mg, 1 tablet, Oral, HS, Alexandrea Lugo MD, 8.6 mg at 03/19/24 2109    sertraline (ZOLOFT) tablet 25 mg, 25 mg, Oral, Daily, Amada Epperson PA-C, 25 mg at 03/20/24 0805     tamsulosin (FLOMAX) capsule 0.4 mg, 0.4 mg, Oral, Daily With Dinner, Darwin Paz MD, 0.4 mg at 03/19/24 1721    Past Medical History:   Diagnosis Date    Arthritis     Encounter for general adult medical examination without abnormal findings 03/20/2019    Fall 11/03/2022    Hyperlipidemia     Macular degeneration, wet (HCC)     Urinary retention 06/02/2022       Patient Active Problem List    Diagnosis Date Noted    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    Behavior safety risk 02/20/2024    Hematuria 03/04/2024    Headache 02/20/2024    Bilateral lower extremity weakness 02/17/2024    Abnormal CT scan, lumbar spine 02/15/2024    Severe protein-calorie malnutrition (HCC) 02/14/2024    Debility 02/13/2024    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    Dysphagia 11/21/2023    Macular degeneration, age related 02/27/2023    Traumatic subdural hemorrhage with loss of consciousness of unspecified duration, initial encounter ([facility]) 02/27/2023    Sensorineural hearing loss (SNHL), bilateral 08/18/2022    Balance disorder 06/28/2022    Abnormal echocardiogram 06/27/2022    Right bundle-branch block 06/27/2022    Moderate protein-calorie malnutrition (HCC) 06/19/2022    Low BP 06/19/2022    Diastolic dysfunction 06/19/2022    EKG abnormalities 06/19/2022    Constipation due to neurogenic bowel 06/02/2022    Urinary retention 06/02/2022    SDH (subdural hematoma) (HCC) 05/27/2022    Primary osteoarthritis of left knee 05/17/2022    Hygroma 04/26/2022    Right hand pain     Carpal tunnel syndrome on right     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      ** Please Note:  voice to text software may have been used in the creation of this document. Although proof errors in transcription or interpretation are a potential of such software**

## 2024-03-20 NOTE — TEAM CONFERENCE
Acute RehabilitationTeam Conference Note  Date: 3/20/2024   Time: 11:07 AM       Patient Name:  Thomas Chase       Medical Record Number: [id number]   YOB: 1940  Sex: Male          Room/Bed:  [facility] 213/[facility] 213-02  Payor Info:  Payor: MEDICARE / Plan: MEDICARE A AND B / Product Type: Medicare A & B Fee for Service /      Admitting Diagnosis: Cl fracture base skull wth intracran hemorrhage, no loss consciousness (HCC) [S02.109A, S06.300A]   Admit Date/Time:  2/20/2024  3:13 PM  Admission Comments: No comment available     Primary Diagnosis:  Intracranial hemorrhage (HCC)  Principal Problem: Intracranial hemorrhage (HCC)    Patient Active Problem List    Diagnosis Date Noted    Hematuria 03/04/2024    Headache 02/20/2024    Behavior safety risk 02/20/2024    Bilateral lower extremity weakness 02/17/2024    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    Abnormal CT scan, lumbar spine 02/15/2024    Severe protein-calorie malnutrition (HCC) 02/14/2024    Debility 02/13/2024    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    Dysphagia 11/21/2023    Macular degeneration, age related 02/27/2023    Traumatic subdural hemorrhage with loss of consciousness of unspecified duration, initial encounter ([facility]ly [facility]) 02/27/2023    Sensorineural hearing loss (SNHL), bilateral 08/18/2022    Balance disorder 06/28/2022    Abnormal echocardiogram 06/27/2022    Right bundle-branch block 06/27/2022    Moderate protein-calorie malnutrition (HCC) 06/19/2022    Low BP 06/19/2022    Diastolic dysfunction 06/19/2022    EKG abnormalities 06/19/2022    Constipation due to neurogenic bowel 06/02/2022    Urinary retention 06/02/2022    SDH (subdural hematoma) (Former[facility]) 05/27/2022    Primary osteoarthritis of left knee 05/17/2022    Hygroma 04/26/2022    Right hand pain     Carpal tunnel syndrome on right     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Formerly [facility]) 09/02/2021    Overweight (BMI 25.0-29.9) 01/27/2021    Negative depression screening 09/26/2019     Hypercholesterolemia 07/12/2018    Borderline hypertension 07/12/2018    Hearing loss 04/08/2009       Physical Therapy:    Weight Bearing Status: Weight Bearing as Tolerated  Transfers: Incidental Touching, Supervision  Bed Mobility: Supervision  Amulation Distance (ft): 206 feet  Ambulation: Incidental Touching  Assistive Device for Ambulation: Roller Walker  Wheelchair Mobility Distance: 133 ft  Wheelchair Mobility: Maximum Assistance (max visual and tactile cues)  Number of Stairs: 14  Assistive Device for Stairs: Bilateral Hand Rails  Stair Assistance: Incidental Touching  Ramp: Incidental Touching  Assistive Device for Ramp: Roller Walker  Discharge Recommendations: Skilled Nursing Facility  DC Home with:: 24 Hour Assisteance, Family Support, First Floor Setup, Home Physical Therapy (vs alternate placement)     02.21/2024:   Patient seen today for IE.  Presents, following hospitalizaton  for  traumatic brain dysfunction (L SDH, R SAH, L anterior hypodensity lateral temporal lobe) and PMH significant for previous fall with TBI (SDH with L craniotomy), chronic aphasia, hard of hearing with cochlear implants in place, and hyperlipidemia.    Patient MIN A bed mobility, MOD A transfers with walker, MAX A wheelchair mobility level and unlevel surfaces up to 133' with increased visual and tactile cues, min-mod A ambulation up to 66' level and unlevel surfaces with walker and wheelchair follow.  Patient limited by decreased ROM/strength, decreased balance and safety, decreased endurance, impaired  cognition, and impaired hearing (patient wears cochlear implants).   May benefit from continued inpatient [facility] PT to increase function, safety, and increased independence in prep for safe d/c to home with family and continued PT services as needed.    02/28/2024:   Patient participating in therapy and making positive gains.  Patient CG/S bed mobility, CG transfers with walker, CG ambulation up to 167' level and unlevel  surfaces with walker, cg negotiation of 7 steps with 2 handrails.   Patient requires increased visual and tactile cues for general safety, Finley management, and task sequence.  Patient also remains limited by decreased ROM/strength, decreased balance and safety, decreased endurance, impaired cognition, aphasia, and impaired hearing.  Patient may benefit from continued inpatient [facility] PT to increase function, safety, and increased independence in prep for safe d/c to home with family and continued PT services.    03/06/2024:   Patient participating in therapy and continuing to make positive gains.   Patient CG/S bed mobility, CG/close S transfers with walker, CG/close S ambulation up to 180' level and unlevel surfaces with walker, MIN A ambulation outside on uneven pavement/sidewalks, CG/close S negotiation of steps with 2 handrails, CG ramp with walker.  Patient remains limited by decreased ROM/strength, decreased balance and safety, decreased endurance, impaired cognition, aphasia, and impaired hearing.   Patient may benefit from continued inpatient [facility] PT to increase function, safety, and increased independence in prep for safe d/c to home with family and continued PT services as needed.    03/13/2024:   Patient participating in therapy.   Patient S bed mobility with bed rails, CG/close S transfers with walker, cg ambulation up to 190' level and unlevel surfaces with walker, CG ramp with walker, cg 14 steps with 2 handrails.   Remains limited by decreased ROM/strength, decreased balance and safety, decreased endurance, impaired cognition, aphasia, and impaired hearing.  May benefit from continued inpatient [facility] PT to increase function, safety, and independence in prep for safe d/c to home with family and continued PT services as needed vs alternate placement.    03/20/2024:  Patient participating in therapy.  Patient S bed mobility, close S/S STS transfers with walker, CG SPT with walker, CG ambulation up to 206'  level and unlevel surfaces with walker, CG ramp with walker, cg 14 steps with 2 handrails.  Patient continues to require increased visual/tactile cues for sequence, technique, and direction to safely complete all tasks.  Remains limited by decreased ROM/strength, decreased balance and safety, decreased endurance, impaired cognition, aphasia, impaired hearing.  Awaiting SNF bed.    Occupational Therapy:  Eating: Supervision  Grooming: Supervision  Bathing: Minimal Assistance  Bathing: Minimal Assistance  Upper Body Dressing: Minimal Assistance  Lower Body Dressing: Maximum Assistance  Toileting: Maximum Assistance  Tub/Shower Transfer: Incidental Touching  Toilet Transfer: Incidental Touching  Cognition: Exceptions to WNL  Cognition: Decreased Memory, Decreased Executive Functions, Decreased Attention, Decreased Comprehension, Decreased Safety  Orientation: Person, Place  Discharge Recommendations:  (eventual SNF placement)  DC Home with:: 24 Hour Supervision, Family Support, First Floor Setup, Home Occupational Therapy       2/21/24: Pt new admit to ARU. Current LOF listed above. Barriers to d/c include decreased strength throughout, decreased balance, impaired cognition including safety awareness, problem solving, attention, comprehension, expression, and short term memory, and decreased activity tolerance; all affect independence in self care and fxl transfers. Pt will participate in ADL training, therapeutic exercises and activities, fxl mobility/transfers, cognitive training, and activity tolerance in order to progress towards goals. Pt would benefit from continued skilled OT services in order to address listed barriers and prepare for safe d/c.     2/28/24: Pt's current LOF listed above. Continues with barriers to d/c of decreased strength throughout, decreased balance, impaired cognition including safety awareness, problem solving, attention, comprehension, expression, and short term memory, and decreased  activity tolerance; all affect independence in self care and fxl transfers. Pt participating in ADL training, therapeutic exercises and activities, fxl mobility/transfers, cognitive training, and activity tolerance in order to progress towards goals. Pt would benefit from continued skilled OT services in order to address listed barriers and prepare for safe d/c.      3/6/24: Pt's current LOF listed above. Continues with barriers to d/c of decreased strength throughout, decreased balance, impaired cognition including safety awareness, problem solving, attention, comprehension, expression, and short term memory, and decreased activity tolerance; all affect independence in self care and fxl transfers. Pt participating in ADL training, therapeutic exercises and activities, fxl mobility/transfers, cognitive training, and activity tolerance in order to progress towards goals. Pt would benefit from continued skilled OT services in order to address listed barriers and prepare for safe d/c.     3/13/24: Pt's current LOF listed above. Continues with barriers to d/c of decreased strength throughout, decreased balance, impaired cognition including safety awareness, problem solving, attention, comprehension, expression, and short term memory, and decreased activity tolerance; all affect independence in self care and fxl transfers. Pt participating in ADL training, therapeutic exercises and activities, fxl mobility/transfers, cognitive training, and activity tolerance in order to progress towards goals. Pt would benefit from continued skilled OT services in order to address listed barriers and prepare for safe d/c.     3/20/24: Pt's current LOF listed above. Continues with barriers to d/c of decreased strength throughout, decreased balance, impaired cognition including safety awareness, problem solving, attention, comprehension, expression, and short term memory, and decreased activity tolerance; all affect independence in self  care and fxl transfers. Pt participating in ADL training, therapeutic exercises and activities, fxl mobility/transfers, cognitive training, and activity tolerance in order to progress towards goals. Pt would benefit from continued skilled OT services in order to address listed barriers and prepare for safe d/c.     Speech Therapy:  Mode of Communication: Non-verbal  Speech/Language: Expressive Aphasia  Cognition: Exceptions to WNL  Cognition: Decreased Memory, Decreased Executive Functions, Decreased Attention, Decreased Comprehension, Decreased Safety  Orientation: Person, Place (unintelligible response for time and situation.)  Swallowing: Exceptions to WNL  Swallowing: Oral Dysphagia, Pharyngeal Dysphagia, Aspiration Risk  Diet Recommendations: Level 1/Puree, [place] Thick  Discharge Recommendations:  (home vs. SNF)  DC Home with:: 24 Hour Supervision, Family Support (continued speech therapy services)  2/21 per nursing patient coughing with thin liquids. Diet downgraded to nectar thick liquids, consult pending this date.     2/28     Cognitive Linguisitic Assessments   Cognitive Linquistic Quick Test (CLQT) BCAT 8/21- pt only able to participate via white board 2' hearing loss     Comprehension   Assist Devices Other  (cochlear implants)   Auditory    (white board for communication)   QI: Comprehension 2. Sometimes Understands: Understands only basic conversations or simple, direct phrases. Frequently requires cues to understand   Comprehension (FIM) 2 - Understands only simple expressions or gestures (waves, hello)   Expression   Non-Verbal Basic   QI: Expression 2. Frequently exhibits difficulty with expressing needs and ideas   Expression (FIM) 2 - Uses only simple expressions or gestures (waves, hello)   Social Interaction   Social Interaction (FIM) 2 - Interacts appropriately 25-49% of time   Problem Solving   Problem solving (FIM) 2 - Solves basic problems 25-49% of time   Memory   Memory (FIM) 2 -  Recognizes, recalls/performs 25-49%   Memory Skills   Orientation Level Oriented to person   Consistencies Assessed and Performance   Materials Admnistered Puree/Level 1;Nectar thick liquid   Oral Stage Mild impaired   Phargngeal Stage Moderate impaired   Swallow Mechanics Mild delayed;Swallow initation;Weak larygneal rise;Aspiration risk   Strategies and Efficacy small bites, small sips, slow rate   Recommendations   Risk for Aspiration Present   Recommendations Consider oral diet   Diet Solid Recommendation Level 1 Dysphagia/pureed   Diet Liquid Recommendation Nectar thick liquid   Recommended Form of Meds Whole;With thick liquid   General Precautions Aspiration precautions;Feed only when alert;Minimize distractions;Upright as possible for all oral intake;Remain upright for 45 mins after meals   Compensatory Swallowing Strategies Alternate solids and liquids   Results Reviewed with PT/Family/Caregiver     Eating   Type of Assistance Needed Supervision  (visual cues)   Physical Assistance Level No physical assistance   Eating CARE Score 4       3/6 Continue puree and nectar thick liquids via 10CC cup and water via 5CC. Continue to abide by strict aspiration precautions. Plan to repeat VBS prior to discharge. Last VBS was 2/14/24.     3/13  Repeat VBS on 3/11/24 recommendations for pureed solids and nectar thick liquids. Overall concern for swallow decline from previous VBS on 2/14/24 with recommendations at that time for pureed solids and thin liquids. Sigificant pharyngeal residue with all consistencies is a concern for diet tolerance across meals with increased aspiration risk. Strict aspiration precautions, swallow strategies and direct supervision with meals continues.     UPDATED FIM LEVELS   Comprehension   Comprehension (FIM) 3 - Understands basic info/conversation 50-74% of time  (via written direction in short phrases, gesture or lip reading)   Expression   Expression (FIM) 3 - Expresses basic info/needs  50-74% of time  (wants/needs via verbal 1-2 word phrases or gesture.)   Social Interaction   Social Interaction (FIM) 4 - Interacts 75-89% of time  (increased social greetings and attention throughout session.)   Problem Solving   Problem solving (FIM) 2 - Solves basic problems 25-49% of time   Memory   Memory (FIM) 2 - Recognizes, recalls/performs 25-49%     3/20 Sigificant pharyngeal residue with all consistencies is a concern for diet tolerance across meals with increased aspiration risk. Strict aspiration precautions, swallow strategies and direct supervision with meals continues. He is allowed water only via 5CC provalve cup or single controlled sips via straw ( pinching and removing ) to control rate.   UPDATED FIM LEVELS     Comprehension   Comprehension (FIM) 4 - Understands basic info/conversation 75-90% of time   Expression   Expression (FIM) 3 - Expresses basic info/needs 50-74% of time   Social Interaction   Social Interaction (FIM) 5 - Interacts appropriately with others 90% of time   Problem Solving   Problem solving (FIM) 3 - Solves basic problmes 50-74% of time   Memory   Memory (FIM) 3 - Recognizes, recalls/performs 50-74%   Memory Skills       Nursing Notes:  Appetite: Good  Diet Type: Nectar thick liquids, Dysphagia III                      Diet Patient/Family Education Complete: Yes    Type of Wound (LDA):  (allevyn to sacrum and heels preventative)                    Type of Wound Patient/Family Education: Yes  Bladder: 1 - Total Assistance  Catheter Type: Finley  Bladder Patient/Family Education: Yes  Bowel: Medication     Bowel Patient/Family Education: Yes  Pain Location/Orientation: Location: Back, Location: Buttocks  Pain Score: 0                       Hospital Pain Intervention(s): Declines  Pain Patient/Family Education: Yes  Medication Management/Safety  Injectable: Lovenox  Safe Administration: Yes  Reason for non-safe administration: not attempted  Medication Patient/Family Education  Complete: Yes    2/27/2024  Pt. Admit after ICH/falls. Pt. Is deaf. Communicates via white board and handouts. Does occasionally say one or two words to questions asked, answers are appropriate. Requires close supervision  when eating and requires special cups to drink with- on nectar thick fluids.  Follows commands. Pills crushed and in applesauce. Has failed urinary retention protocol and has a brooks catheter in place now. Has had small amount of bleeding at meatus and in bag. Reported to earlier shift that tip of penis was irritated. Has not reported this to myself. Mediplex to sacrum and heels as prevention.   3/4/24 Pt On nectar thick  fluids uses special cups. Supervision for all meals wife is cleared by speech to sit with PT during meals. Brooks back in place failed voiding trial.     3/12/2024 A/O x 2-3, pt. Is starting to be more verbal and clearer with words. Communication done with white board. Had VBS done on 3-11, now on Modified Dysphagia diet 1-pureed with nectar thick fluids, medications crushed and given in applesauce. Failed urinary retention trial again and now has brooks in. Developed Hypotensive episodes which improved after fluids, currently receiving NSS at 75ml/hr. Preventive allevny on buttocks and both heels. Scrotum is pink, cream applied. Meds adjusted for on-going thrush. 4 SR up for safety and bed/tab alarms on.     3/18/2024  Uses white board for basic communication. A/O x 2-3. Dysphagia diet nectar thick fluids. Pt on antibiotics (Levaquin) x5 days. Preventative allevyn on heels and buttocks. Repo q 2hrs.     Case Management:     Discharge Planning  Living Arrangements: Lives w/ Spouse/significant other  Support Systems: Spouse/significant other, Son, Daughter  Assistance Needed: unknown  Type of Current Residence: Private residence  Current Home Care Services: No  Initial assessment & orientation to [facility] with Pt & his wife, who was visiting bedside. Pt had difficulty hearing this  worker, but his wife expressed that she is in agreement with tentative team recommendations. Tx team meeting was held today & tentative target DC date is 3/8, with home health services (all services). Discussed role of team members & reviewed TX & DC planning with Pt & Pt's spouse, who expressed understanding & agreement. Pt's additional family also visiting today & also present when physician assessed Pt.  SW will continue to monitor & assist as needed with TX & DC planning.     2/28/24 - Tx team recommendations reviewed with patient & family, who expressed understanding & agreement. Target DC date is 3/8 with [facility] (all); a list of providers was provided to Pt & a referral will be made based on Pt preference (expressed interest in [facility] & [facility]). Pt's spouse inquiring if there may be an extension; discussed that this will be reviewed at the team meeting next week. Provided information regarding meal options. SW will continue to monitor & assist as needed with Tx & DC planning.    Is the patient actively participating in therapies? yes  List any modifications to the treatment plan: na    Barriers Interventions   Urinary retention, UTI Finley, medical management and oversight   Poor skin integrity Allevan for protection    Decreased cog ST strategies, ADL/transfer/gait training   Decreased balance, end, strength Therapeutic exercise, therapeutic activity   dysphagia ST strategies, puree with nectar thick, encourage liquids     Is the patient making expected progress toward goals? yes  List any update or changes to goals: na    Medical Goals: Patient will be able to manage medical conditions and comorbid conditions with medications and follow up upon completion of rehab program    Weekly Team Goals:   Rehab Team Goals  ADL Team Goal: Patient will require supervision with ADLs with least restrictive device upon completion of rehab program  Transfer Team Goal: Patient will require assist with transfers with least  restrictive device upon completion of rehab program  Locomotion Team Goal: Patient will require assist with locomotion with least restrictive device upon completion of rehab program  Cognitive Team Goal: Patient will return to premorbid level of cognitive activity upon completion of rehab program    Mod I bed mobilty, CG transfers and mobility  CG bathing, min assist LB dressing  Min assist Comprehension, expression, memory, and problem solving     Health and wellness: to be able to complete activities with family     Discussion: Patient may req alternate placement at this time for ongoing therapy and nursing care. If patient is able to return home, will req [facility] for PT, OT, ST, nursing, and aides. Patient may benefit from assistive living.     Anticipated Discharge Date:  when bed available

## 2024-03-21 ENCOUNTER — TELEMEDICINE (OUTPATIENT)
Dept: NEUROLOGY | Facility: CLINIC | Age: 84
End: 2024-03-21
Payer: MEDICARE

## 2024-03-21 DIAGNOSIS — S06.5XAA SDH (SUBDURAL HEMATOMA) (HCC): ICD-10-CM

## 2024-03-21 DIAGNOSIS — Z87.828 H/O TRAUMATIC SUBDURAL HEMATOMA: ICD-10-CM

## 2024-03-21 DIAGNOSIS — E78.00 HYPERCHOLESTEROLEMIA: Primary | Chronic | ICD-10-CM

## 2024-03-21 DIAGNOSIS — R93.0 ABNORMAL CT OF THE HEAD: ICD-10-CM

## 2024-03-21 PROBLEM — G47.00 INSOMNIA: Status: ACTIVE | Noted: 2024-03-21

## 2024-03-21 PROBLEM — E44.0 MODERATE PROTEIN-CALORIE MALNUTRITION (HCC): Status: RESOLVED | Noted: 2022-06-19 | Resolved: 2024-03-21

## 2024-03-21 PROBLEM — I62.9 INTRACRANIAL HEMORRHAGE (HCC): Status: RESOLVED | Noted: 2024-02-16 | Resolved: 2024-03-21

## 2024-03-21 PROCEDURE — 97530 THERAPEUTIC ACTIVITIES: CPT

## 2024-03-21 PROCEDURE — 99215 OFFICE O/P EST HI 40 MIN: CPT | Performed by: PSYCHIATRY & NEUROLOGY

## 2024-03-21 PROCEDURE — 97537 COMMUNITY/WORK REINTEGRATION: CPT

## 2024-03-21 PROCEDURE — 92507 TX SP LANG VOICE COMM INDIV: CPT

## 2024-03-21 PROCEDURE — G2212 PROLONG OUTPT/OFFICE VIS: HCPCS | Performed by: PSYCHIATRY & NEUROLOGY

## 2024-03-21 PROCEDURE — 97535 SELF CARE MNGMENT TRAINING: CPT

## 2024-03-21 PROCEDURE — G2211 COMPLEX E/M VISIT ADD ON: HCPCS | Performed by: PSYCHIATRY & NEUROLOGY

## 2024-03-21 PROCEDURE — 97110 THERAPEUTIC EXERCISES: CPT

## 2024-03-21 PROCEDURE — 92526 ORAL FUNCTION THERAPY: CPT

## 2024-03-21 PROCEDURE — 97116 GAIT TRAINING THERAPY: CPT

## 2024-03-21 PROCEDURE — 99233 SBSQ HOSP IP/OBS HIGH 50: CPT | Performed by: PHYSICAL MEDICINE & REHABILITATION

## 2024-03-21 RX ORDER — LANOLIN ALCOHOL/MO/W.PET/CERES
9 CREAM (GRAM) TOPICAL
Status: DISCONTINUED | OUTPATIENT
Start: 2024-03-21 | End: 2024-04-05 | Stop reason: HOSPADM

## 2024-03-21 RX ORDER — MIDODRINE HYDROCHLORIDE 5 MG/1
5 TABLET ORAL
Status: DISCONTINUED | OUTPATIENT
Start: 2024-03-21 | End: 2024-04-05 | Stop reason: HOSPADM

## 2024-03-21 RX ADMIN — LEVOFLOXACIN 500 MG: 250 TABLET, FILM COATED ORAL at 16:43

## 2024-03-21 RX ADMIN — MIDODRINE HYDROCHLORIDE 2.5 MG: 2.5 TABLET ORAL at 07:30

## 2024-03-21 RX ADMIN — CLOTRIMAZOLE 10 MG: 10 LOZENGE ORAL; TOPICAL at 07:30

## 2024-03-21 RX ADMIN — CYANOCOBALAMIN TAB 500 MCG 500 MCG: 500 TAB at 09:03

## 2024-03-21 RX ADMIN — ENOXAPARIN SODIUM 40 MG: 40 INJECTION SUBCUTANEOUS at 09:03

## 2024-03-21 RX ADMIN — Medication 1 TABLET: at 09:03

## 2024-03-21 RX ADMIN — POLYETHYLENE GLYCOL 3350 17 G: 17 POWDER, FOR SOLUTION ORAL at 16:55

## 2024-03-21 RX ADMIN — PRAVASTATIN SODIUM 40 MG: 40 TABLET ORAL at 16:43

## 2024-03-21 RX ADMIN — MIDODRINE HYDROCHLORIDE 2.5 MG: 2.5 TABLET ORAL at 11:49

## 2024-03-21 RX ADMIN — ACETAMINOPHEN 650 MG: 325 TABLET ORAL at 21:06

## 2024-03-21 RX ADMIN — SERTRALINE HYDROCHLORIDE 25 MG: 25 TABLET ORAL at 09:03

## 2024-03-21 RX ADMIN — TAMSULOSIN HYDROCHLORIDE 0.4 MG: 0.4 CAPSULE ORAL at 16:43

## 2024-03-21 RX ADMIN — LORATADINE 10 MG: 10 TABLET ORAL at 09:03

## 2024-03-21 RX ADMIN — MIDODRINE HYDROCHLORIDE 5 MG: 5 TABLET ORAL at 16:43

## 2024-03-21 RX ADMIN — Medication 9 MG: at 21:07

## 2024-03-21 RX ADMIN — SENNOSIDES 8.6 MG: 8.6 TABLET, FILM COATED ORAL at 21:07

## 2024-03-21 NOTE — NURSING NOTE
Virtual visit was done with neurologist Dr. May via pt's daughters phone.  Pt's wife and son also present along with staff.  Dr Lin and Dr. Lugo aware of pt's low BP this a.m.  See new orders.  Pt was asymptomatic with low BP.  Continue same plan of care.

## 2024-03-21 NOTE — PROGRESS NOTES
Progress Note - Thomas Chase 83 y.o. male MRN: [id number]    Unit/Bed#: [facility] 213-02 Encounter: [id number]        Subjective:   Patient seen and examined at bedside after reviewing the chart and discussing the case with the caring staff.      Patient examined at bedside.  Patient has no acute symptoms.     Blood pressure noted to be low today.     Patient is a possible discharge to SNF tomorrow.     Physical Exam   Vitals: Blood pressure (!) 88/60, pulse 96, temperature 98 °F (36.7 °C), temperature source Temporal, resp. rate 16, height 6' (1.829 m), weight 60.4 kg (133 lb 2.5 oz), SpO2 94%.,Body mass index is 18.06 kg/m².  Constitutional: Patient in no acute distress.  HEENT: PERR, EOMI, MMM.  Cardiovascular: Normal rate and regular rhythm.    Pulmonary/Chest: Effort normal and breath sounds normal.   Abdomen: Soft, + BS, NT.    Assessment/Plan:  Thomas Chase is a(n) 83 y.o. year old male with left SDH and right SAH.     1.  Cardiac with hx of HTN, HLD/hypotension.  On pravastatin 40 mg daily (simvastatin nonformulary).  Noted to have low BP 3/10/24.   Patient's orthostatic vitals did not show significant drop.  Received IV fluids and started on midodrine 2.5 mg 3 times daily 3/18/2024.  Midodrine increased to 5 mg TID 3/21/24. Continue to monitor.     2.  Allergic rhinitis.  Patient is on loratadine 10 mg daily.  3.  Depression/anxiety.  Patient is on Zoloft 25 mg daily.  4.  Insomnia.  Patient is on melatonin 6 mg at bedtime.  5.  Thrush.  Discontinue nystatin liquid swish and swallow 3/11/2024 and started on clotrimazole lozenges 3/11/2024.  If patient is unable to do this will restart nystatin liquid swish and swallow.  6.  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  Patient is mainly nonverbal.  7.  Urinary retention/enterococcal UTI.  Urinary retention protocol.  Flomax decreased to 0.4 mg daily 3/12/2024 due to possible cause of hypotension.  Finley catheter placed 2/22/24, removed on  2/28/24, placed again 3/1/24, removed again 3/7/24 and reinserted 3/8/24.    UA was ordered 3/12/24 to rule out UTI as patient was hypotensive with CRP and sed rate were slightly elevated.  [facility] texted on call ID 3/15/24 regarding urine culture result who recommended no treatment at that time as patient asymptomatic without urinary symptoms or flank pain.  Patient had temp 100.7F later that evening and was started on Levaquin 500 mg daily x 5 days.  8.  Dysphagia.  Speech therapy following.  Dysphagia 1 puureed with nectar thick liquid.  Video swallow study done on 3/11/2024.  9.  Vitamin B12 deficiency.  Patient started on vitamin B12 500 mcg daily.    10.  Pain and bowel management.  As per physiatrist.  11.  Intracranial hemorrhage.   Patient will be receiving intensive PT OT ST as per physiatrist.    Anticipated discharge date:  TBD  PCP:  RONY Diaz    The patient was discussed with Dr. Paz and he is in agreement with the above note.

## 2024-03-21 NOTE — PROGRESS NOTES
03/21/24 0700   Pain Assessment   Pain Assessment Tool 0-10   Pain Score No Pain   Restrictions/Precautions   Precautions Aspiration;Bed/chair alarms;Cognitive;Fall Risk;Finley;Hard of hearing   Weight Bearing Restrictions No   ROM Restrictions No   Eating   Type of Assistance Needed Supervision   Physical Assistance Level No physical assistance   Comment coughing after approx 50% of bites with visual cues to swallow, pt occasionally talked with mouth full of food and required cues to swallow before talking; decreased appetite overall and required encouragement to finish aprox 50% of tray; did continue to reach for provalve cup I'ly without cues   Eating CARE Score 4   Oral Hygiene   Type of Assistance Needed Supervision   Physical Assistance Level No physical assistance   Oral Hygiene CARE Score 4   Grooming   Able To Wash/Dry Face;Brush/Clean Teeth   Limitation Noted In Problem Solving;Safety;Sequencing   Shower/Bathe Self   Type of Assistance Needed Physical assistance   Physical Assistance Level 26%-50%   Comment assist for buttocks, bilat lower LE; Finley care completed by OT   Shower/Bathe Self CARE Score 3   Bathing   Assessed Bath Style Sponge Bath  (pt initially preferred shower, however low BP rendered sponge bath in room more safe)   Anticipated D/C Bath Style Shower;Sponge Bath   Able to Gather/Transport No   Able to Adjust Water Temperature No   Able to Wash/Rinse/Dry (body part) Left Arm;Right Arm;L Upper Leg;R Upper Leg;Chest;Abdomen   Limitations Noted in Balance;Endurance;Problem Solving;ROM;Safety;Sequencing   Positioning Seated;Standing   Upper Body Dressing   Type of Assistance Needed Physical assistance   Physical Assistance Level 51%-75%   Comment assist to don/doff flannel shirts, doff t-shirt over head, align new t-shirt to don over arms   Upper Body Dressing CARE Score 2   Lower Body Dressing   Type of Assistance Needed Physical assistance   Physical Assistance Level 76% or more   Comment  able to assist in pulling clothing  down from hips when standing   Lower Body Dressing CARE Score 2   Putting On/Taking Off Footwear   Type of Assistance Needed Physical assistance   Physical Assistance Level 76% or more   Comment able to present bilat feet to OT to doff/don footwear   Putting On/Taking Off Footwear CARE Score 2   Dressing/Undressing Clothing   Remove UB Clothes Pullover Shirt;Jacket   Don UB Clothes Pullover Shirt;Jacket   Remove LB Clothes Pants;Undergarment;Socks   Don LB Clothes Pants;Undergarment;Socks;TEDs   Limitations Noted In Balance;Endurance;Problem Solving;Safety;Sequencing;Strength;ROM   Positioning Supported Sit   Sit to Lying   Type of Assistance Needed Supervision   Physical Assistance Level No physical assistance   Sit to Lying CARE Score 4   Lying to Sitting on Side of Bed   Type of Assistance Needed Physical assistance   Physical Assistance Level 25% or less   Lying to Sitting on Side of Bed CARE Score 3   Sit to Stand   Type of Assistance Needed Physical assistance   Physical Assistance Level 25% or less   Comment level of physical assist decreased as session progressed 2* decreasing fatigue   Sit to Stand CARE Score 3   Bed-Chair Transfer   Type of Assistance Needed Physical assistance   Physical Assistance Level 25% or less   Chair/Bed-to-Chair Transfer CARE Score 3   Transfer Bed/Chair/Wheelchair   Limitations Noted In Balance;Endurance;Problem Solving;Sequencing;LE Strength   Adaptive Equipment Roller Walker   Cognition   Overall Cognitive Status Impaired   Arousal/Participation Alert;Responsive;Cooperative   Attention Attends with cues to redirect   Orientation Level Oriented to person;Oriented to place   Memory Decreased short term memory;Decreased recall of recent events;Decreased recall of precautions   Following Commands Follows one step commands with increased time or repetition   Assessment   Treatment Assessment Pt agreeable to OT session this AM. Received lying  supine in bed. Upon pt's initial exit from bed, pt's BP taken and was extremely low; pt returned to bed with bilat LE elevated. Pt's BP raised to WNL after approx 10min in bed. ADL session then completed OOB; details in respective sections. Pt is maxA LB dressing, modA UB dressing, Brayan bathing, supervision oral care. Visual and physical cues required to initiate tasks and continue attending to tasks. Supervision for breakfast tray required with visual cues to increase intake. Pt with increased coughing this session after bites. Pt did verbalize sporadically more this session especially after BP readings. Pt's barriers to d/c include decreased strength throughout, decreased balance, impaired hearing, impaired cognition including safety awareness, problem solving, attention, comprehension, and expression, and decreased activity tolerance; all affect independence in self care and fxl transfers. Pt would benefit from continued skilled OT services in order to address listed barriers and prepare for safe d/c.   Prognosis Fair   Problem List Decreased strength;Decreased range of motion;Decreased endurance;Impaired balance;Decreased coordination;Decreased cognition;Decreased safety awareness;Impaired hearing   Plan   Treatment/Interventions ADL retraining;LE strengthening/ROM;Functional transfer training;Therapeutic exercise;Endurance training;Cognitive reorientation;Patient/family training;Equipment eval/education;Compensatory technique education;OT   Progress Progressing toward goals   OT Therapy Minutes   OT Time In 0700   OT Time Out 0830   OT Total Time (minutes) 90   OT Mode of treatment - Individual (minutes) 90

## 2024-03-21 NOTE — PROGRESS NOTES
Virtual Regular Visit    Verification of patient location:    Patient is located at Other in the following state in which I hold an active license [place]      Assessment/Plan:   Patient Instructions   Subdural hematoma and subarachnoid hemorrhage: Thomas presents for virtual visit with regard to a recent change in his functional status and subsequent hospitalization during which she experienced a fall.  Initially he was found to have bilateral subdural hematomas and then a traumatic subarachnoid hemorrhage with a hypodensity in the left temporal lobe.  Subsequent scans revealed expansion of the edema in the left temporal lobe which has since resolved potentially consistent with a contusion.  There is artifact in the region however from his cochlear implant.  -He remains in the acute rehab center which is reasonable.  He will benefit from ongoing physical occupational and speech therapy  -Reportedly he is eating reasonably well in spite of the need for a modified consistency diet but is not able to tolerate adequate hydration as result of aspiration.  -He has made some improvements with regard to mobility but is still experiencing a degree of impulsivity  -At this point there is no specific history that would suggest he requires antiplatelet/anticoagulant medications so these can be appropriately deferred  -He should continue to have blood pressure monitoring according to unit standards  -We will review his current condition with the physical medicine and rehab team and consideration will be given to adjusting his sertraline.    I will plan for him to return to the office in 3 months time to see me directly but would be happy to see him sooner if the need should arise.  If he has any symptoms concerning for TIA or stroke including sudden painless loss of vision or double vision, difficulty speaking or swallowing, vertigo/room spinning that does not quickly resolve, or weakness/numbness/loss of coordination affecting 1  side of the face or body he should proceed by ambulance to the nearest emergency room immediately.      Problem List Items Addressed This Visit        Nervous and Auditory    SDH (subdural hematoma) (HCC)       Neurology/Sleep    H/O traumatic subdural hematoma       Other    Hypercholesterolemia - Primary (Chronic)    Abnormal CT of the head            Reason for visit is   Chief Complaint   Patient presents with   • Virtual Regular Visit          Encounter provider Chris May MD    Provider located at [address] [place] [address] [facility] [address] [id number]      Recent Visits  Date Type Provider Dept   03/21/24 Telemedicine Chris May MD [facility] [place]   Showing recent visits within past 7 days and meeting all other requirements  Future Appointments  No visits were found meeting these conditions.  Showing future appointments within next 150 days and meeting all other requirements     Subjective    Thomas Chase is a 83 y.o. male who was contacted via telemetry visit while in the acute rehab center.  He and his family were informed televisit was being conducted over HIPAA compliant meansin brief review,.    He has a remote history of significant hearing loss leading to very limited oral communication and is status post cochlear implant.  Recently he presented to the hospital with bilateral lower extremity weakness and was found to have subdural collection.  During his hospitalization he experienced a fall and then was found to have some subarachnoid hemorrhage and a hypodensity in the left temporal lobe.  That hypodensity was followed forward and did not quite follow a vascular territory so there was concern over a traumatic contusion versus a venous infarct.  CT venogram did not reveal cerebral venous sinus thrombosis therefore the former diagnosis was not favored.    He eventually had been transferred to the acute rehab center and has been  receiving therapy services.  His speech therapist was present and reported that earlier his dysphagia had improved some but subsequent to his fall and additional symptoms his dysphagia persisted/improvement has plateaued to a degree.  He requires a puréed diet but actually is consuming % of his meals and has gained 9 pounds.  Unfortunately he still has significant aspiration to any reasonable volume of even nectar thick liquids and as result is really not getting the hydration that is required.  He continues to have a Finley in place which hopefully will be removed in the morning.    The patient was nearly noncommunicative but by report he does respond to written directions and does make some intermittent verbal communications.  He does express discomfort when it occurs.  For the first time today he spoke a little bit with his daughter about the possibility of returning home.    We discussed his prior lab work.  He has been receiving B12 supplementation at this point.  He did have a significant elevation in C-reactive protein but this is quite nonspecific.  We had an extensive discussion with regard to whether or not a PEG tube would be appropriate for him.        Past Medical History:   Diagnosis Date   • Arthritis    • Encounter for general adult medical examination without abnormal findings 03/20/2019   • Fall 11/03/2022   • Hyperlipidemia    • 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   • Macular degeneration, wet (HCC)    • Urinary retention 06/02/2022       Past Surgical History:   Procedure Laterality Date   • BRAIN HEMATOMA EVACUATION Left 05/28/2022    Procedure: left CRANIOTOMY FOR SUBDURAL HEMATOMA;  Surgeon: Chris Watts MD;  Location: [facility];  Service: Neurosurgery   • CARPAL TUNNEL RELEASE     • HERNIA REPAIR Right     inguinal   • IR CEREBRAL ANGIOGRAPHY / INTERVENTION  06/02/2022   • AZ COCHLEAR DEVICE IMPLANTATION W/WO MASTOIDECTOMY Left 1/17/2023    Procedure: LEFT COCHLEAR  IMPLANTATION WITH MASTOIDECTOMY AND FACIAL NERVE MONITORING WITH AUDIOMETRIC TESTING OF THE IMPLANT;  Surgeon: Susie Tuttle MD;  Location: [facility];  Service: ENT   • NV NEUROPLASTY &/TRANSPOS MEDIAN NRV CARPAL TUNNE Right 09/20/2021    Procedure: RELEASE CARPAL TUNNEL;  Surgeon: Bruno Segovia MD;  Location: [facility];  Service: Orthopedics       No current facility-administered medications for this visit.     Current Outpatient Medications   Medication Sig Dispense Refill   • acetaminophen (TYLENOL) 325 mg tablet Take 2 tablets (650 mg total) by mouth every 6 (six) hours as needed for mild pain     • bisacodyl (DULCOLAX) 10 mg suppository Insert 1 suppository (10 mg total) into the rectum daily as needed (constipation 3rd line)     • clotrimazole (MYCELEX) 10 mg davy Take 1 tablet (10 mg total) by mouth 5 (five) times a day for 22 doses     • cyanocobalamin (VITAMIN B-12) 500 MCG tablet Take 1 tablet (500 mcg total) by mouth daily     • lactulose (CHRONULAC) 10 g/15 mL solution Take 30 mL (20 g total) by mouth 2 (two) times a day as needed (constipation 2nd line refractory to miralax)     • levofloxacin (LEVAQUIN) 500 mg tablet Take 1 tablet (500 mg total) by mouth every 24 hours for 4 doses     • meclizine (ANTIVERT) 25 mg tablet Take 1 tablet (25 mg total) by mouth every 8 (eight) hours as needed for nausea     • melatonin 3 mg Take 2 tablets (6 mg total) by mouth daily at bedtime     • midodrine (PROAMATINE) 2.5 mg tablet Take 1 tablet (2.5 mg total) by mouth 3 (three) times a day before meals     • Multiple Vitamin (multivitamin) tablet Take 1 tablet by mouth daily     • Multiple Vitamins-Minerals (PreserVision AREDS 2) CAPS Take 1 capsule by mouth in the morning 30 capsule 0   • polyethylene glycol (MIRALAX) 17 g packet Take 17 g by mouth daily as needed (constipation)     • senna (SENOKOT) 8.6 mg Take 1 tablet (8.6 mg total) by mouth daily at bedtime     • sertraline (ZOLOFT) 25 mg tablet Take 1  tablet (25 mg total) by mouth daily     • simvastatin (ZOCOR) 20 mg tablet Take 1 tablet (20 mg total) by mouth daily at bedtime     • tamsulosin (FLOMAX) 0.4 mg Take 1 capsule (0.4 mg total) by mouth daily with dinner     • loratadine (CLARITIN) 10 mg tablet Take 1 tablet (10 mg total) by mouth daily (Patient not taking: Reported on 3/21/2024)       Facility-Administered Medications Ordered in Other Visits   Medication Dose Route Frequency Provider Last Rate Last Admin   • acetaminophen (TYLENOL) tablet 650 mg  650 mg Oral Q6H PRN Daniel Clayton MD   650 mg at 03/21/24 2106   • bisacodyl (DULCOLAX) rectal suppository 10 mg  10 mg Rectal Daily PRN Darwin Paz MD       • cyanocobalamin (VITAMIN B-12) tablet 500 mcg  500 mcg Oral Daily Darwin Paz MD   500 mcg at 03/21/24 0903   • enoxaparin (LOVENOX) subcutaneous injection 40 mg  40 mg Subcutaneous Q24H [facility] Alexandrea Lugo MD   40 mg at 03/21/24 0903   • lactulose (CHRONULAC) oral solution 20 g  20 g Oral BID PRN Darwin Paz MD   20 g at 03/05/24 2107   • loratadine (CLARITIN) tablet 10 mg  10 mg Oral Daily Amada Epperson PA-C   10 mg at 03/21/24 0903   • meclizine (ANTIVERT) tablet 25 mg  25 mg Oral Q8H PRN Amada Epperson PA-C       • melatonin tablet 9 mg  9 mg Oral HS Alexandrea Lugo MD   9 mg at 03/21/24 2107   • midodrine (PROAMATINE) tablet 5 mg  5 mg Oral TID AC Amada ROSA Epperson, PA-C   5 mg at 03/21/24 1643   • multivitamin-minerals (CENTRUM) tablet 1 tablet  1 tablet Oral Daily Amada ROSA Epperson, PA-C   1 tablet at 03/21/24 0903   • polyethylene glycol (MIRALAX) packet 17 g  17 g Oral Daily PRN Ashley Depadua, MD   17 g at 03/21/24 1655   • pravastatin (PRAVACHOL) tablet 40 mg  40 mg Oral Daily With Dinner Amada ROSA Epperson PA-C   40 mg at 03/21/24 1643   • senna (SENOKOT) tablet 8.6 mg  1 tablet Oral HS Alexandrea Lugo MD   8.6 mg at 03/21/24 2107   • sertraline (ZOLOFT) tablet 25 mg  25 mg Oral Daily BENNY TalbotC    25 mg at 03/21/24 0903   • tamsulosin (FLOMAX) capsule 0.4 mg  0.4 mg Oral Daily With Dinner Darwin Paz MD   0.4 mg at 03/21/24 1643        No Known Allergies    Review of Systems   Constitutional:  Negative for appetite change, fatigue and fever.   HENT:  Positive for hearing loss (cochlear) and trouble swallowing. Negative for tinnitus and voice change.    Eyes: Negative.  Negative for photophobia, pain and visual disturbance.   Respiratory: Negative.  Negative for shortness of breath.    Cardiovascular: Negative.  Negative for palpitations.   Gastrointestinal: Negative.  Negative for nausea and vomiting.   Endocrine: Negative.  Negative for cold intolerance.   Genitourinary: Negative.  Negative for dysuria, frequency and urgency.   Musculoskeletal:  Negative for back pain, gait problem, myalgias, neck pain and neck stiffness.   Skin: Negative.  Negative for rash.   Allergic/Immunologic: Negative.    Neurological:  Positive for speech difficulty. Negative for dizziness, tremors, seizures, syncope, facial asymmetry, weakness, numbness and headaches.   Hematological: Negative.  Does not bruise/bleed easily.   Psychiatric/Behavioral: Negative.  Negative for confusion, hallucinations and sleep disturbance.    All other systems reviewed and are negative.      Video Exam    At the time of my examination he was awake and laying in bed reclined.  He was able to maintain wakefulness and attention throughout the course of our examination.  He was attentive to multiple family members and staff in the room but only made verbal attempts at communication with written instruction, typically he would read an instruction aloud and then follow it.  He was able to tasks switch.  His face was symmetric.  His voice was hypophonic and there was significant dysarthria.  He did not follow commands or interact to pantomiming.  There was no obvious lateralizing weakness.  He was able to rise with assistance and with assistance and his  walker he was able to ambulate to the bathroom and back.    Visit Time  I have spent a total time of 63 minutes on 3/21/24 in caring for this patient including Diagnostic results, Prognosis, Risks and benefits of tx options, Instructions for management, Patient and family education, Importance of tx compliance, Risk factor reductions, Impressions, Counseling / Coordination of care, Documenting in the medical record, Reviewing / ordering tests, medicine, procedures  , and Obtaining or reviewing history  .

## 2024-03-21 NOTE — CASE MANAGEMENT
CM met with patient, wife and son, reviewed team  meeting information. At this time, [facility] is accepting patient for discharge. Patient has a virtual appt at 1:00 appt on 3/21 1:00. [place] be [place] after the appt. At this time, wife and son are okay with this, but would like to speak with billy Dinh.  Transport seet up for 1:45 with roundtrip :[facility] [facility] will be transporting patient to [facility].  CM received a phone call from billy Allegra, explained there has been no acceptance from [place], and [facility] has accepted patient, and transport has been set up for 1:45 after the virtual appt. CM will continue to follow.

## 2024-03-21 NOTE — PROGRESS NOTES
03/21/24 1013   Pain Assessment   Pain Assessment Tool 0-10   Pain Score No Pain   Restrictions/Precautions   Precautions Aspiration;Bed/chair alarms;Cognitive;Fall Risk;Finley;Hard of hearing;Impulsive;Supervision on toilet/commode   Cognition   Overall Cognitive Status Impaired   Arousal/Participation Alert;Responsive;Cooperative   Attention Attends with cues to redirect   Orientation Level Oriented to person;Oriented to place   Memory Decreased short term memory;Decreased recall of recent events;Decreased recall of precautions   Following Commands Follows one step commands with increased time or repetition   Roll Left and Right   Type of Assistance Needed Supervision   Comment bed rails   Roll Left and Right CARE Score 4   Lying to Sitting on Side of Bed   Type of Assistance Needed Incidental touching   Comment cg using bed rails; increased visual/tactile cues for sequence/tactile cues   Lying to Sitting on Side of Bed CARE Score 4   Sit to Stand   Type of Assistance Needed Supervision   Comment close S/S STS with RW   Sit to Stand CARE Score 4   Bed-Chair Transfer   Type of Assistance Needed Incidental touching   Comment cg SPT with RW; increased visual/tactile cue for sequence/technique   Chair/Bed-to-Chair Transfer CARE Score 4   Walk 10 Feet   Type of Assistance Needed Incidental touching   Comment cg level and unlevel surfaces with RW; increased visual/tactile cues for sequence/direction   Walk 10 Feet CARE Score 4   Walk 50 Feet with Two Turns   Type of Assistance Needed Incidental touching   Comment cg level and unlevel surfaces with RW; increased visual/tactile cues for sequence/direction   Walk 50 Feet with Two Turns CARE Score 4   Walk 150 Feet   Type of Assistance Needed Incidental touching   Comment cg level and unlevel surfaces with RW; increased visual/tactile cues for sequence/direction   Walk 150 Feet CARE Score 4   Walking 10 Feet on Uneven Surfaces   Type of Assistance Needed Incidental  touching   Comment cg level and unlevel surfaces with RW; increased visual/tactile cues for sequence/direction   Walking 10 Feet on Uneven Surfaces CARE Score 4   Ambulation   Primary Mode of Locomotion Prior to Admission Walk   Distance Walked (feet) 192 ft  (192' 156' 152' 10-15' x 2 around PT gym)   Assist Device Roller Walker   Gait Pattern Inconsistant Maria Victoria;Slow Maria Victoria;Decreased foot clearance;Narrow ILIANA;Forward Flexion   Limitations Noted In Balance;Coordination;Device Management;Endurance;Posture;Safety;Strength   Provided Assistance with: Balance;Direction   Walk Assist Level Contact Guard   Findings cg level and unlevel surfaces with RW; increased visual/tactile cues for sequence/direction   Does the patient walk? 2. Yes   Curb or Single Stair   Style negotiated Single stair   Type of Assistance Needed Incidental touching   Comment cg with 2 handrails and increased visual/tactile cues for sequence/direction   1 Step (Curb) CARE Score 4   4 Steps   Type of Assistance Needed Incidental touching   Comment cg with 2 handrails and increased visual/tactile cues for sequence/direction   4 Steps CARE Score 4   12 Steps   Type of Assistance Needed Incidental touching   Comment cg with 2 handrails and increased visual/tactile cues for sequence/direction   12 Steps CARE Score 4   Stairs   Type Stairs;Ramp   # of Steps 14   Weight Bearing Precautions WBAT;Fall Risk   Assist Devices Bilateral Rail   Findings cg with 2 handrails and increased visual/tactile cues for sequence/direction; cg ramp with RW and cues for sequence/direction   Therapeutic Interventions   Strengthening seated and supine ther ex   Balance gait and transfer training   Other ramp and stair negotiation   Assessment   Treatment Assessment Patient agreeable to therapy session.  BP improved from AM session with OT; now 100/63.    Increased visual/tactile cues required for sequence/technique/direction for safe completion of all tasks.  Unable to  manage Finley.   Increased attention to Finley with ambulation today.  Trying to step over/around it despite it not being in his way to safely ambulate.  Completed ther ex for general LE strengthening; gait and transfer training focusing on sequence and technique for improved balance and safety with functional mobility using walker.   Negotiated ramp with RW cg and steps with 2 handrails cg; both with increased visual/tactile cues.   PT Barriers   Physical Impairment Decreased strength;Decreased range of motion;Decreased endurance;Impaired balance;Decreased mobility;Decreased coordination;Impaired judgement;Decreased safety awareness;Impaired hearing   Functional Limitation Wheelchair management;Walking;Transfers;Standing;Stair negotiation;Ramp negotiation;Car transfers   Plan   Treatment/Interventions Functional transfer training;LE strengthening/ROM;Elevations;Therapeutic exercise;Bed mobility;Gait training   Progress Improving as expected   PT Therapy Minutes   PT Time In 1013   PT Time Out 1200   PT Total Time (minutes) 107   PT Mode of treatment - Individual (minutes) 107   PT Mode of treatment - Concurrent (minutes) 0   PT Mode of treatment - Group (minutes) 0   PT Mode of treatment - Co-treat (minutes) 0   PT Mode of Treatment - Total time(minutes) 107 minutes   PT Cumulative Minutes 2153   Therapy Time missed   Time missed? No

## 2024-03-21 NOTE — PROGRESS NOTES
PM&R PROGRESS NOTE:  Thomas Chase 83 y.o. male MRN: [id number]  Unit/Bed#: -02 Encounter: [id number]        **CHANGE IN REHAB DIAGNOSIS FROM PRE-ADMISSION SCREEN  Rehab Diagnosis: Impairment of mobility, safety, Activities of Daily Living (ADLs), and cognitive/communication skills due to Brain Dysfunction:  02.22  Traumatic, Closed Injury  Etiologic Diagnosis: L SDH, R SAH, L anterior hypodensity lateral temporal lobe    HPI: Thomas Chase is a 83 y.o. 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  Unclear if patient had head trauma.  CT head showing a 3 mm left frontal subdural hemorrhage.  CT lumbar spine showing DJD.  Patient found to have dysphagia and was seen by speech therapy.  VFSS on 2/15/2024 cleared him for a puréed diet with thin liquids.  Patient was also started by psychiatry for depression and started on Zoloft 25 mg daily.  Patient sustained a rapid response and a unwitnessed fall with head trauma notable bump on head on 2/15/2024.  CT head showing a new small volume acute subarachnoid hemorrhage in the left frontal opercular region as well as new nondisplaced fracture of the zygomatic arch with soft tissue contusion, stable 3 mm subdural hemorrhage seen previously.  Patient transferred to [facility] for further evaluation.  Patient seen by neurology and neurosurgery.  CTA head and neck showing a left anterior hypodensity in the left anterior lateral temporal lobe with 2 punctate hyperdensities.  These represented possible nonhemorrhagic contusions or venous infarcts.  Less likely to represent ischemia or neoplasm.  Follow-up imaging recommended with CT head in 2-3 weeks.  CT  venogram showing patent dural venous sinuses.  Patient was cleared for DVT prophylaxis and started on Keppra for seizure prophylaxis.    Medical course complicated by suspected UTI based on UA and symptoms.  Patient was treated with antibiotics x 3 days.  After medical stabilization, patient found to have acute functional deficits from his baseline in mobility, self-care, speech swallow cognition therefore admitted to [facility] for acute inpatient rehabilitation.    SUBJECTIVE: Patient seen face-to-face.  Was restless last night.  Today denies pain, sadness, or dizziness.  Wants to go home.        ASSESSMENT: Stable, progressing      PLAN:    Rehabilitation  Functional deficits: Aphasia, dysphagia, impaired cognition, impaired balance, impulsivity, poor safety awareness impaired mobility, self care    Continue current rehabilitation plan of care to maximize function.  Expected Discharge: SNF bed pending vs home with hired assistance    Current Function:  Physical Therapy Occupational Therapy Speech Therapy   Weight Bearing Status: Weight Bearing as Tolerated  Transfers: Incidental Touching, Supervision  Bed Mobility: Supervision  Amulation Distance (ft): 206 feet  Ambulation: Incidental Touching  Assistive Device for Ambulation: Roller Walker  Wheelchair Mobility Distance: 133 ft  Wheelchair Mobility: Maximum Assistance (max visual and tactile cues)  Number of Stairs: 14  Assistive Device for Stairs: Bilateral Hand Rails  Stair Assistance: Incidental Touching  Ramp: Incidental Touching  Assistive Device for Ramp: Roller Walker  Discharge Recommendations: Skilled Nursing Facility  DC Home with:: 24 Hour Assisteance, Family Support, First Floor Setup, Home Physical Therapy (vs alternate placement)   Eating: Supervision  Grooming: Supervision  Bathing: Minimal Assistance  Bathing: Minimal Assistance  Upper Body Dressing: Minimal Assistance  Lower Body Dressing: Maximum Assistance  Toileting: Maximum  Assistance  Tub/Shower Transfer: Incidental Touching  Toilet Transfer: Incidental Touching  Cognition: Exceptions to WNL  Cognition: Decreased Memory, Decreased Executive Functions, Decreased Attention, Decreased Comprehension, Decreased Safety  Orientation: Person, Place   Mode of Communication: Non-verbal  Speech/Language: Expressive Aphasia  Cognition: Exceptions to WNL  Cognition: Decreased Memory, Decreased Executive Functions, Decreased Attention, Decreased Comprehension, Decreased Safety  Orientation: Person, Place (unintelligible response for time and situation.)  Swallowing: Exceptions to WNL  Swallowing: Oral Dysphagia, Pharyngeal Dysphagia, Aspiration Risk  Diet Recommendations: Level 1/Ambrosio Qiu Thick  Discharge Recommendations:  (home vs. SNF)  DC Home with:: 24 Hour Supervision, Family Support (continued speech therapy services)       DVT prophylaxis  Continue Lovenox      * Intracranial hemorrhage (HCC)-resolved as of 3/21/2024  Assessment & Plan  Initial presentation on 2/13/2024 with increased weakness in the legs, mental status changes for several days  CT head showing a 3 mm left frontal subdural hemorrhage  Rapid response 2/15/2024 with fall and head trauma  CT head showing a new small volume acute subarachnoid hemorrhage in the left frontal opercular region and new nondisplaced fracture of the zygomatic arch with soft tissue contusion.    Transferred to [facility]  CTA head and neck and and repeat CT head showing a left anterior hypodensity in the left anterior lateral temporal lobe with 2 punctate hyperdensities.  Differential diagnosis nonhemorrhagic contusions or venous infarcts.  Patient seen by neurology and neurosurgery  Conservative management  Cleared for DVT prophylaxis  Started on Keppra for seizure prophylaxis  Repeat CT head scheduled on Monday, 3/4/2024 -personally reviewed  Radiology read as follows:  Improving left anterior temporal lobe contusions with resolving edema. Stable  subacute subdural hemorrhage along the anterior left frontal convexity. Resolved subarachnoid and intraventricular hemorrhages. Unchanged minimal hyperdensity beneath the left craniotomy flap. Stable right parafalcine subdural hygroma. There is no new intra or extra-axial hemorrhage.  Follow-up with neurosurgery on 3/6/2024 virtually completed.  Recommendations as follows: Neurosurgery will sign off, patient will follow-up in 2 weeks with repeat CT head   CTH 3/16/24: No acute intracranial abnormality.Stable thin chronic left frontal subdural collection stable since 11/10/2023. Evaluation of the left temporal lobe is limited due to artifact from the cochlear implant but there may be mild developing encephalomalacia related to prior contusion.  Follow-up with neurosurgery and neurology in outpatient clinic (appointment is 3/21/24 with neurology in 3/22/2024 with neurosurgery)    Behavior safety risk  Assessment & Plan  Patient demonstrating mild impulsivity in acute care  Fall x 2 in acute care  Falls at home in the past    Safety behavior plan going well and ARC.  No further impulsivity  Nursing and staff to provide close supervision near nursing station  Patient placed on 4 side rails (not as a restraint, patient and wife preference)  Monitor sleep-wake cycle  Avoid overstimulation: 1 visitor at a time, low lights, low sounds      Insomnia  Assessment & Plan  Increase Melatonin to 9 mg QHS    Hematuria  Assessment & Plan  Started 3/2 - 3/3/2024  Minor, and more likely related to irritation from Finley catheter  Irrigation as needed per urology  Asymptomatic  H&H stable    Headache  Assessment & Plan  Resolved  Continue Tylenol 650 mg PRN     Severe protein-calorie malnutrition (HCC)  Assessment & Plan  BMI 18.06  Nutrition following  Optimize oral intake  Supplements ordered  He is eating % of meals  Hydration is variable  Maintain IV  IVF PRN  SLP working towards free water protocol    Dysphagia  Assessment &  Plan  VFSS completed 2/15/2024  Cleared for a puréed diet with nectar thick liquids  SLP to follow while at [facility]  Repeat VFSS 3/11/24: Mild oral dysphagia, Moderate oral-pharyngeal dysphagia  Recommendations:  Diet: Dysphagia 1 pureed   Liquids: Nectar   Patient doing very well with oral food eating % of all of his meals.  Also gaining weight.  At times has difficulty with fluid intake due to nectar thick liquids and dysphagia.  Repeat swallow study recommended in 3-4 weeks  Supplement with IV fluids if needed  SLP also working towards a free water nectar thick protocol with oral care prior  Possible future consideration for PEG       Low BP  Assessment & Plan  Resolved  Continue midodrine 2.5 mg TID (3/18/24) > Increased to 5 mg TID (3/21/24)    BP lower since 3/10/2024  Compression stockings and abdominal binder ordered  Internal medicine ordering IV fluid bolus.  I continued IVF x 1 L which was completed  BP continues to be very low especially in standing.   Labs reviewed - nothing to explain low BP        Urinary retention  Assessment & Plan  Finley catheter removed on [facility]  Trial of void underway  Mixed pattern  Unfortunately required multiple straight catheterizations  Finley catheter replaced 3/1/24 per urology  Flomax reduced to 0.4 mg daily due to hypotension   Trial of void 3/7/2024 - failed this  Finley re-inserted 3/8/24  Patient likely to require longer time prior to removal of Finley catheter -recommend removal in 2 weeks around 3/22/2024  Plan for trial of void 3/22/2024      Constipation due to neurogenic bowel  Assessment & Plan  Patient started on more aggressive bowel program yesterday  BM 3/17/24 - getting lactulose today    Hearing loss  Assessment & Plan  Chronic hearing loss  Patient status post cochlear implant  MRI contraindicated    Hypercholesterolemia  Assessment & Plan  Continue simvastatin 20 mg daily (home dose)    UTI (urinary tract infection)-resolved as of 2/21/2024  Assessment &  Plan  Suspected UTI based on UA and symptoms in acute care  Completed 3 days IV antibiotics  In ARC:  UA was ordered by IM : equivocal - neg.   Culture was done by internal medicine.  Enterococcus faecalis  Likely colonized.  Asymptomatic.  Over the weekend, however, Dr. Paz did decide to initiate treatment with Levaquin x 5 days.          Appreciate IM consultants medical co-management.  Labs, medications, and imaging personally reviewed.      ROS:  A ten point review of systems was completed on 03/21/24 and pertinent positives are listed in subjective section. All other systems reviewed were negative.       OBJECTIVE:   BP (!) 88/60 (BP Location: Right arm)   Pulse 96   Temp 98 °F (36.7 °C) (Temporal)   Resp 16   Ht 6' (1.829 m)   Wt 60.4 kg (133 lb 2.5 oz)   SpO2 94%   BMI 18.06 kg/m²     Physical Exam  Vitals and nursing note reviewed.   Constitutional:       General: He is not in acute distress.     Appearance: He is well-developed.   HENT:      Head: Normocephalic.      Nose: Nose normal.   Eyes:      Conjunctiva/sclera: Conjunctivae normal.   Cardiovascular:      Pulses: Normal pulses.   Pulmonary:      Effort: Pulmonary effort is normal.      Breath sounds: No wheezing.   Abdominal:      General: There is no distension.      Palpations: Abdomen is soft.   Genitourinary:     Comments: + Finley catheter  Musculoskeletal:         General: No swelling.      Cervical back: Neck supple.   Skin:     General: Skin is warm.   Neurological:      Mental Status: He is alert and oriented to person, place, and time.      Motor: Weakness present.   Psychiatric:         Mood and Affect: Mood normal.          Lab Results   Component Value Date    WBC 6.27 03/18/2024    HGB 11.9 (L) 03/18/2024    HCT 37.3 03/18/2024    MCV 98 03/18/2024     03/18/2024     Lab Results   Component Value Date    SODIUM 137 03/18/2024    K 4.3 03/18/2024     03/18/2024    CO2 28 03/18/2024    BUN 15 03/18/2024    CREATININE  0.56 (L) 03/18/2024    GLUC 82 03/18/2024    CALCIUM 8.5 03/18/2024     Lab Results   Component Value Date    INR 1.02 06/02/2022    INR 1.10 05/29/2022    INR 1.04 05/28/2022    PROTIME 13.0 06/02/2022    PROTIME 13.7 05/29/2022    PROTIME 13.2 05/28/2022           Current Facility-Administered Medications:     acetaminophen (TYLENOL) tablet 650 mg, 650 mg, Oral, Q6H PRN, Daniel Clayton MD, 650 mg at 03/20/24 2103    bisacodyl (DULCOLAX) rectal suppository 10 mg, 10 mg, Rectal, Daily PRN, Darwin Paz MD    cyanocobalamin (VITAMIN B-12) tablet 500 mcg, 500 mcg, Oral, Daily, Darwin Paz MD, 500 mcg at 03/21/24 0903    enoxaparin (LOVENOX) subcutaneous injection 40 mg, 40 mg, Subcutaneous, Q24H JEANNIE, Alexandrea Lugo MD, 40 mg at 03/21/24 0903    lactulose (CHRONULAC) oral solution 20 g, 20 g, Oral, BID PRN, Darwin Paz MD, 20 g at 03/05/24 2107    levofloxacin (LEVAQUIN) tablet 500 mg, 500 mg, Oral, Q24H, Darwin Paz MD, 500 mg at 03/20/24 1442    loratadine (CLARITIN) tablet 10 mg, 10 mg, Oral, Daily, Amada Coughlinnall, PA-C, 10 mg at 03/21/24 0903    meclizine (ANTIVERT) tablet 25 mg, 25 mg, Oral, Q8H PRN, Amada LEE Dagnall, PA-C    melatonin tablet 9 mg, 9 mg, Oral, HS, Alexandrea Lugo MD    midodrine (PROAMATINE) tablet 5 mg, 5 mg, Oral, TID AC, Amada L Dagnall, PA-C    multivitamin-minerals (CENTRUM) tablet 1 tablet, 1 tablet, Oral, Daily, Amada L Dagnall, PA-C, 1 tablet at 03/21/24 0903    polyethylene glycol (MIRALAX) packet 17 g, 17 g, Oral, Daily PRN, Ashley Depadua, MD, 17 g at 03/05/24 1740    pravastatin (PRAVACHOL) tablet 40 mg, 40 mg, Oral, Daily With Dinner, CHRISTIAN Talbot-LAUREN, 40 mg at 03/20/24 1642    senna (SENOKOT) tablet 8.6 mg, 1 tablet, Oral, HS, Alexandrea Lugo MD, 8.6 mg at 03/20/24 2103    sertraline (ZOLOFT) tablet 25 mg, 25 mg, Oral, Daily, CHRISTIAN Talbot-C, 25 mg at 03/21/24 0903    tamsulosin (FLOMAX) capsule 0.4 mg, 0.4 mg, Oral, Daily With Dinner,  Darwin Paz MD, 0.4 mg at 03/20/24 1642    Past Medical History:   Diagnosis Date    Arthritis     Encounter for general adult medical examination without abnormal findings 03/20/2019    Fall 11/03/2022    Hyperlipidemia     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    Macular degeneration, wet (HCC)     Urinary retention 06/02/2022       Patient Active Problem List    Diagnosis Date Noted    Behavior safety risk 02/20/2024    Insomnia 03/21/2024    Abnormal CT of the head 03/21/2024    Hematuria 03/04/2024    Headache 02/20/2024    Bilateral lower extremity weakness 02/17/2024    Abnormal CT scan, lumbar spine 02/15/2024    Severe protein-calorie malnutrition (HCC) 02/14/2024    Debility 02/13/2024    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    Dysphagia 11/21/2023    Macular degeneration, age related 02/27/2023    H/O traumatic subdural hematoma 02/27/2023    Sensorineural hearing loss (SNHL), bilateral 08/18/2022    Balance disorder 06/28/2022    Abnormal echocardiogram 06/27/2022    Right bundle-branch block 06/27/2022    Low BP 06/19/2022    Diastolic dysfunction 06/19/2022    EKG abnormalities 06/19/2022    Constipation due to neurogenic bowel 06/02/2022    Urinary retention 06/02/2022    SDH (subdural hematoma) ([facility]) 05/27/2022    Primary osteoarthritis of left knee 05/17/2022    Hygroma 04/26/2022    Right hand pain     Carpal tunnel syndrome on right     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    Overweight (BMI 25.0-29.9) 01/27/2021    Negative depression screening 09/26/2019    Hypercholesterolemia 07/12/2018    Borderline hypertension 07/12/2018    Hearing loss 04/08/2009          Alexandrea Lugo MD    I have spent a total time of 55 minutes on 03/21/24 in caring for this patient including Impressions, Documenting in the medical record, Reviewing / ordering tests, medicine, procedures  , and discussion with internal medicine .      ** Please Note:  voice to text software may have been used in the  creation of this document. Although proof errors in transcription or interpretation are a potential of such software**

## 2024-03-22 PROCEDURE — 97116 GAIT TRAINING THERAPY: CPT

## 2024-03-22 PROCEDURE — 97110 THERAPEUTIC EXERCISES: CPT

## 2024-03-22 PROCEDURE — 97537 COMMUNITY/WORK REINTEGRATION: CPT

## 2024-03-22 PROCEDURE — 99222 1ST HOSP IP/OBS MODERATE 55: CPT | Performed by: STUDENT IN AN ORGANIZED HEALTH CARE EDUCATION/TRAINING PROGRAM

## 2024-03-22 PROCEDURE — 97530 THERAPEUTIC ACTIVITIES: CPT

## 2024-03-22 PROCEDURE — 92526 ORAL FUNCTION THERAPY: CPT | Performed by: NURSE PRACTITIONER

## 2024-03-22 PROCEDURE — 92507 TX SP LANG VOICE COMM INDIV: CPT | Performed by: NURSE PRACTITIONER

## 2024-03-22 PROCEDURE — 97129 THER IVNTJ 1ST 15 MIN: CPT

## 2024-03-22 PROCEDURE — 97535 SELF CARE MNGMENT TRAINING: CPT

## 2024-03-22 RX ADMIN — MIDODRINE HYDROCHLORIDE 5 MG: 5 TABLET ORAL at 11:55

## 2024-03-22 RX ADMIN — SENNOSIDES 8.6 MG: 8.6 TABLET, FILM COATED ORAL at 21:14

## 2024-03-22 RX ADMIN — TAMSULOSIN HYDROCHLORIDE 0.4 MG: 0.4 CAPSULE ORAL at 16:30

## 2024-03-22 RX ADMIN — ENOXAPARIN SODIUM 40 MG: 40 INJECTION SUBCUTANEOUS at 08:07

## 2024-03-22 RX ADMIN — LACTULOSE 20 G: 20 SOLUTION ORAL at 11:55

## 2024-03-22 RX ADMIN — LORATADINE 10 MG: 10 TABLET ORAL at 08:07

## 2024-03-22 RX ADMIN — BISACODYL 10 MG: 10 SUPPOSITORY RECTAL at 13:27

## 2024-03-22 RX ADMIN — SERTRALINE HYDROCHLORIDE 25 MG: 25 TABLET ORAL at 08:07

## 2024-03-22 RX ADMIN — Medication 1 TABLET: at 08:07

## 2024-03-22 RX ADMIN — MIDODRINE HYDROCHLORIDE 5 MG: 5 TABLET ORAL at 08:07

## 2024-03-22 RX ADMIN — PRAVASTATIN SODIUM 40 MG: 40 TABLET ORAL at 16:30

## 2024-03-22 RX ADMIN — Medication 9 MG: at 21:14

## 2024-03-22 RX ADMIN — MIDODRINE HYDROCHLORIDE 5 MG: 5 TABLET ORAL at 16:30

## 2024-03-22 RX ADMIN — CYANOCOBALAMIN TAB 500 MCG 500 MCG: 500 TAB at 08:07

## 2024-03-22 NOTE — ASSESSMENT & PLAN NOTE
Seen virtually for 2-week hospital follow-up for 3mm left SDH, L frontal and right high frontal SAH, s/p fall in 2/2024  History of craniotomy for SDH evacuation 5/2022 by Dr. Watts with subsequent L MMA embolization by Dr. Schmid  No AC/AP medications  Currently at baseline mental status, no new deficits  Prior imaging with enlarging hypodensity within the left anterior lateral temporal lobe, unclear etiology.    Imaging:  CT head wo 3/16/2024:  No acute intracranial abnormality. 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 Evaluation of the left temporal lobe is limited due to artifact from the cochlear implant but there may be mild developing encephalomalacia related to prior contusion.    Plan:  Imaging reviewed, small SDH noted on the left, appears unchanged when compared to imaging . No new hemorrhage noted.  No need for additional follow up at this time.  Continue frequent neurological checks.   STAT CT head with any neurological decline including drop GCS of 2pts within 1 hr.  Recommend SBP < 160mmHg.  Hold all therapeutic antiplatelet and anticoagulation medications.   Medical management and pain control per primary team  Mobilize with PT/OT  DVT PPX: SCDs and lovenox   Patient requires neurology follow-up  Patient unable to obtain MRI secondary to cochlear implant.    Neurosurgery will sign off, follow up PRN.  Call with questions or concerns.

## 2024-03-22 NOTE — CONSULTS
[facility]  Consult  Name: Thomas Chase 83 y.o. male I MRN: [id number]  Unit/Bed#: -02 I Date of Admission: 2/20/2024   Date of Service: 3/22/2024 I Hospital Day: 31    Inpatient consult to Neurosurgery  Consult performed by: Hilda Irby PA-C  Consult ordered by: Alexandrea Lugo MD          Assessment/Plan   SDH (subdural hematoma) (HCC)  Assessment & Plan  Seen virtually for 2-week hospital follow-up for 3mm left SDH, L frontal and right high frontal SAH, s/p fall in 2/2024  History of craniotomy for SDH evacuation 5/2022 by Dr. Watts with subsequent L MMA embolization by Dr. Schmid  No AC/AP medications  Currently at baseline mental status, no new deficits  Prior imaging with enlarging hypodensity within the left anterior lateral temporal lobe, unclear etiology.    Imaging:  CT head wo 3/16/2024:  No acute intracranial abnormality. 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 Evaluation of the left temporal lobe is limited due to artifact from the cochlear implant but there may be mild developing encephalomalacia related to prior contusion.    Plan:  Imaging reviewed, small SDH noted on the left, appears unchanged when compared to imaging . No new hemorrhage noted.  No need for additional follow up at this time.  Continue frequent neurological checks.   STAT CT head with any neurological decline including drop GCS of 2pts within 1 hr.  Recommend SBP < 160mmHg.  Hold all therapeutic antiplatelet and anticoagulation medications.   Medical management and pain control per primary team  Mobilize with PT/OT  DVT PPX: SCDs and lovenox   Patient requires neurology follow-up  Patient unable to obtain MRI secondary to cochlear implant.    Neurosurgery will sign off, follow up PRN.  Call with questions or concerns.           History of Present Illness   HPI: Thomas Chase is a 83 y.o. 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  He is currently at rehab and seen virtually.    History is provided mostly by rehab provider and chart review as patient cannot hear.  He presented to the [facility] for increased debility in February 2024 unfortunately had a fall out of bed.  Rapid response was called at that time and he was noted to have a left subarachnoid hemorrhage as well as left small subdural hematoma.  He does not take anticoagulation or antiplatelet therapy at baseline.  At his 2-week hospital follow-up he continued to have small left-sided subdural hematoma with resolved subarachnoid and IVH.  He was seen again today for another 2-week follow-up.  Rehab provider states patient is doing well overall.  Mobilizing over 200 feet.  He does continue with urinary retention for which they are going to do a voiding trial today.  He does continue with dysphagia.  Dispo planning is pending subacute rehab versus home.  No other complaints at this time.      Review of Systems   HENT:  Positive for hearing loss (chronic, cochlear implants in place) and trouble swallowing (ongoing dysphagia).    Genitourinary:  Positive for difficulty urinating (brooks in place, plan to do voiding trial today).   All other systems reviewed and are negative.      Historical Information   Past Medical History:   Diagnosis Date    Arthritis     Encounter for general adult medical examination without abnormal findings 03/20/2019    Fall 11/03/2022    Hyperlipidemia     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    Macular degeneration, wet (HCC)     Urinary retention 06/02/2022     Past Surgical History:   Procedure Laterality Date     BRAIN HEMATOMA EVACUATION Left 05/28/2022    Procedure: left CRANIOTOMY FOR SUBDURAL HEMATOMA;  Surgeon: Chris Watts MD;  Location: [facility];  Service: Neurosurgery    CARPAL TUNNEL RELEASE      HERNIA REPAIR Right     inguinal    IR CEREBRAL ANGIOGRAPHY / INTERVENTION  06/02/2022    NH COCHLEAR DEVICE IMPLANTATION W/WO MASTOIDECTOMY Left 1/17/2023    Procedure: LEFT COCHLEAR IMPLANTATION WITH MASTOIDECTOMY AND FACIAL NERVE MONITORING WITH AUDIOMETRIC TESTING OF THE IMPLANT;  Surgeon: Susie Tuttle MD;  Location: [facility];  Service: ENT    NH NEUROPLASTY &/TRANSPOS MEDIAN NRV CARPAL TUNNE Right 09/20/2021    Procedure: RELEASE CARPAL TUNNEL;  Surgeon: Bruno Segovia MD;  Location: [facility];  Service: Orthopedics     Social History     Substance and Sexual Activity   Alcohol Use Not Currently    Alcohol/week: 3.0 standard drinks of alcohol    Types: 3 Cans of beer per week     Social History     Substance and Sexual Activity   Drug Use No     Social History     Tobacco Use   Smoking Status Former   Smokeless Tobacco Never   Tobacco Comments    Smoked as a teenager     Family History   Problem Relation Age of Onset    Cancer Mother     Hyperlipidemia Father     Coronary artery disease Father        Meds/Allergies   all current active meds have been reviewed, current meds:   Current Facility-Administered Medications   Medication Dose Route Frequency    acetaminophen (TYLENOL) tablet 650 mg  650 mg Oral Q6H PRN    bisacodyl (DULCOLAX) rectal suppository 10 mg  10 mg Rectal Daily PRN    cyanocobalamin (VITAMIN B-12) tablet 500 mcg  500 mcg Oral Daily    enoxaparin (LOVENOX) subcutaneous injection 40 mg  40 mg Subcutaneous Q24H JEANNIE    lactulose (CHRONULAC) oral solution 20 g  20 g Oral BID PRN    loratadine (CLARITIN) tablet 10 mg  10 mg Oral Daily    meclizine (ANTIVERT) tablet 25 mg  25 mg Oral Q8H PRN    melatonin tablet 9 mg  9 mg Oral HS    midodrine (PROAMATINE) tablet 5 mg  5 mg Oral TID AC     multivitamin-minerals (CENTRUM) tablet 1 tablet  1 tablet Oral Daily    polyethylene glycol (MIRALAX) packet 17 g  17 g Oral Daily PRN    pravastatin (PRAVACHOL) tablet 40 mg  40 mg Oral Daily With Dinner    senna (SENOKOT) tablet 8.6 mg  1 tablet Oral HS    sertraline (ZOLOFT) tablet 25 mg  25 mg Oral Daily    tamsulosin (FLOMAX) capsule 0.4 mg  0.4 mg Oral Daily With Dinner   , and PTA meds:   Prior to Admission Medications   Prescriptions Last Dose Informant Patient Reported? Taking?   Multiple Vitamin (MULTIVITAMIN) tablet 2/19/2024 Spouse/Significant Other Yes Yes   Sig: Take by mouth daily    Multiple Vitamins-Minerals (PRESERVISION AREDS 2 PO) 2/19/2024 Spouse/Significant Other Yes Yes   Sig: Take by mouth   acetaminophen (TYLENOL) 325 mg tablet 2/19/2024 Spouse/Significant Other No Yes   Sig: Take 2 tablets (650 mg total) by mouth every 6 (six) hours as needed for mild pain   loratadine (CLARITIN) 10 mg tablet Not Taking Spouse/Significant Other No No   Sig: Take 1 tablet (10 mg total) by mouth daily   Patient not taking: Reported on 2/13/2024   simvastatin (ZOCOR) 20 mg tablet 2/19/2024 Spouse/Significant Other No Yes   Sig: Take 1 tablet (20 mg total) by mouth daily at bedtime      Facility-Administered Medications: None     No Known Allergies    Objective   I/O         03/20 0701  03/21 0700 03/21 0701  03/22 0700 03/22 0701  03/23 0700    P.O.  240 340    Total Intake(mL/kg)  240 (4) 340 (5.6)    Urine (mL/kg/hr) 1000 (0.7) 1300 (0.9) 522 (1.2)    Stool   0    Total Output 1000 1300 522    Net -1000 -1060 -182           Unmeasured Urine Occurrence   0 x    Unmeasured Stool Occurrence   1 x            Physical Exam  Constitutional:       General: He is awake.      Appearance: He is underweight.   HENT:      Head: Normocephalic and atraumatic.   Eyes:      Conjunctiva/sclera: Conjunctivae normal.   Cardiovascular:      Rate and Rhythm: Tachycardia present.   Pulmonary:      Effort: Pulmonary effort is  normal. No respiratory distress.   Neurological:      Mental Status: He is alert.   Psychiatric:         Attention and Perception: Attention normal.         Mood and Affect: Mood and affect normal.         Behavior: Behavior normal. Behavior is cooperative.      Comments: Speech dysarthric at baseline due to chronic hearing loss and cochlear implants       Neurologic Exam     Mental Status   Follows 1 step commands.   Attention: normal. Concentration: normal.   Level of consciousness: alert  GCS 15, follows commands, at baseline speech is dysarthric     Cranial Nerves     CN III, IV, VI   Conjugate gaze: present    CN VIII   Hearing: impaired (chronic)    Motor Exam   CULP with mild RUE weakness with +tremor     Gait, Coordination, and Reflexes     Tremor   Action tremor: right arm      Vitals:Blood pressure 102/70, pulse 103, temperature 97.5 °F (36.4 °C), temperature source Temporal, resp. rate 17, height 6' (1.829 m), weight 60.4 kg (133 lb 2.5 oz), SpO2 92%.,Body mass index is 18.06 kg/m².     Lab Results:   Results from last 7 days   Lab Units 03/18/24  0526   WBC Thousand/uL 6.27   HEMOGLOBIN g/dL 11.9*   HEMATOCRIT % 37.3   PLATELETS Thousands/uL 350   NEUTROS PCT % 73   MONOS PCT % 7   EOS PCT % 3     Results from last 7 days   Lab Units 03/18/24  0526   POTASSIUM mmol/L 4.3   CHLORIDE mmol/L 101   CO2 mmol/L 28   BUN mg/dL 15   CREATININE mg/dL 0.56*   CALCIUM mg/dL 8.5   ALK PHOS U/L 109*   ALT U/L 37   AST U/L 24         Imaging Studies: I have personally reviewed pertinent reports.   and I have personally reviewed pertinent films in PACS    No results found.      EKG, Pathology, and Other Studies: I have personally reviewed pertinent reports.      VTE Prophylaxis: Sequential compression device (Venodyne)  and Enoxaparin (Lovenox)    Code Status: Level 3 - DNAR and DNI  Advance Directive and Living Will:      Power of :    POLST:      Counseling / Coordination of Care  I spent 20 minutes with the  patient.

## 2024-03-22 NOTE — PATIENT INSTRUCTIONS
Subdural hematoma and subarachnoid hemorrhage: Thomas presents for virtual visit with regard to a recent change in his functional status and subsequent hospitalization during which she experienced a fall.  Initially he was found to have bilateral subdural hematomas and then a traumatic subarachnoid hemorrhage with a hypodensity in the left temporal lobe.  Subsequent scans revealed expansion of the edema in the left temporal lobe which has since resolved potentially consistent with a contusion.  There is artifact in the region however from his cochlear implant.  -He remains in the acute rehab center which is reasonable.  He will benefit from ongoing physical occupational and speech therapy  -Reportedly he is eating reasonably well in spite of the need for a modified consistency diet but is not able to tolerate adequate hydration as result of aspiration.  -He has made some improvements with regard to mobility but is still experiencing a degree of impulsivity  -At this point there is no specific history that would suggest he requires antiplatelet/anticoagulant medications so these can be appropriately deferred  -He should continue to have blood pressure monitoring according to unit standards  -We will review his current condition with the physical medicine and rehab team and consideration will be given to adjusting his sertraline.    I will plan for him to return to the office in 3 months time to see me directly but would be happy to see him sooner if the need should arise.  If he has any symptoms concerning for TIA or stroke including sudden painless loss of vision or double vision, difficulty speaking or swallowing, vertigo/room spinning that does not quickly resolve, or weakness/numbness/loss of coordination affecting 1 side of the face or body he should proceed by ambulance to the nearest emergency room immediately.

## 2024-03-22 NOTE — PLAN OF CARE
Problem: PAIN - ADULT  Goal: Verbalizes/displays adequate comfort level or baseline comfort level  Description: Interventions:  - Encourage patient to monitor pain and request assistance  - Assess pain using appropriate pain scale  - Administer analgesics based on type and severity of pain and evaluate response  - Implement non-pharmacological measures as appropriate and evaluate response  - Consider cultural and social influences on pain and pain management  - Notify physician/advanced practitioner if interventions unsuccessful or patient reports new pain  Outcome: Progressing     Problem: INFECTION - ADULT  Goal: Absence or prevention of progression during hospitalization  Description: INTERVENTIONS:  - Assess and monitor for signs and symptoms of infection  - Monitor lab/diagnostic results  - Monitor all insertion sites, i.e. indwelling lines, tubes, and drains  - Monitor endotracheal if appropriate and nasal secretions for changes in amount and color  - Trilla appropriate cooling/warming therapies per order  - Administer medications as ordered  - Instruct and encourage patient and family to use good hand hygiene technique  - Identify and instruct in appropriate isolation precautions for identified infection/condition  Outcome: Progressing     Problem: SAFETY ADULT  Goal: Patient will remain free of falls  Description: INTERVENTIONS:  - Educate patient/family on patient safety including physical limitations  - Instruct patient to call for assistance with activity   - Consult OT/PT to assist with strengthening/mobility   - Keep Call bell within reach  - Keep bed low and locked with side rails adjusted as appropriate  - Keep care items and personal belongings within reach  - Initiate and maintain comfort rounds  - Make Fall Risk Sign visible to staff  - Offer Toileting every 2 Hours, in advance of need  - Initiate/Maintain bed/chair alarm  - Apply yellow socks and bracelet for high fall risk patients  -  Consider moving patient to room near nurses station  Outcome: Progressing     Problem: Prexisting or High Potential for Compromised Skin Integrity  Goal: Skin integrity is maintained or improved  Description: INTERVENTIONS:  - Identify patients at risk for skin breakdown  - Assess and monitor skin integrity  - Assess and monitor nutrition and hydration status  - Monitor labs   - Assess for incontinence   - Turn and reposition patient  - Assist with mobility/ambulation  - Relieve pressure over bony prominences  - Avoid friction and shearing  - Provide appropriate hygiene as needed including keeping skin clean and dry  - Evaluate need for skin moisturizer/barrier cream  - Collaborate with interdisciplinary team   - Patient/family teaching  - Consider wound care consult   Outcome: Progressing     Problem: Nutrition/Hydration-ADULT  Goal: Nutrient/Hydration intake appropriate for improving, restoring or maintaining nutritional needs  Description: Monitor and assess patient's nutrition/hydration status for malnutrition. Collaborate with interdisciplinary team and initiate plan and interventions as ordered.  Monitor patient's weight and dietary intake as ordered or per policy. Utilize nutrition screening tool and intervene as necessary. Determine patient's food preferences and provide high-protein, high-caloric foods as appropriate.     INTERVENTIONS:  - Monitor oral intake, urinary output, labs, and treatment plans  - Assess nutrition and hydration status and recommend course of action  - Evaluate amount of meals eaten  - Assist patient with eating if necessary   - Allow adequate time for meals  - Recommend/ encourage appropriate diets, oral nutritional supplements, and vitamin/mineral supplements  - Order, calculate, and assess calorie counts as needed  - Recommend, monitor, and adjust tube feedings and TPN/PPN based on assessed needs  - Assess need for intravenous fluids  - Provide specific nutrition/hydration  education as appropriate  - Include patient/family/caregiver in decisions related to nutrition  Outcome: Progressing

## 2024-03-22 NOTE — NURSING NOTE
Pt. Given sleep and pain medication at 2100, explaining what I was giving him. Repositioned and pulled up in bed, pillows adjusted, Tv's turned off as well as lights. Pt. Then called multiple times until approx 2300. Via board he was asked if in pain, if thirsty, if needed position change. Difficult to understand. Taking to bathroom to see if had to move his bowels but did not go. Pt. Finally fell asleep around 2300. Able to reposition self in bed

## 2024-03-22 NOTE — NURSING NOTE
Pt. Slept well until woken this AM at 0625 to remove brooks. Procedure explained to pt. Via white board. Brooks removed without difficulty, pt. Tolerated well. Depends on pt and urinal on overhead table. Pt repositioned self left to right during the night.

## 2024-03-22 NOTE — PROGRESS NOTES
03/22/24 1030   Pain Assessment   Pain Assessment Tool 0-10   Pain Score No Pain   Restrictions/Precautions   Precautions Aphasia;Aspiration;Bed/chair alarms;Cognitive;Fall Risk;Hard of hearing;Impulsive;Supervision on toilet/commode   Cognition   Overall Cognitive Status Impaired   Arousal/Participation Alert;Responsive;Cooperative   Attention Attends with cues to redirect   Orientation Level Oriented to person;Oriented to place   Memory Decreased short term memory;Decreased recall of recent events;Decreased recall of precautions   Following Commands Follows one step commands with increased time or repetition   Roll Left and Right   Type of Assistance Needed Supervision   Comment bed rails   Roll Left and Right CARE Score 4   Lying to Sitting on Side of Bed   Type of Assistance Needed Supervision   Comment bed rails; increased visual/tactile cues for hand placement   Lying to Sitting on Side of Bed CARE Score 4   Sit to Stand   Type of Assistance Needed Supervision   Comment close S with RW; increased visual/tactile cues for sequence/hand placement   Sit to Stand CARE Score 4   Bed-Chair Transfer   Type of Assistance Needed Incidental touching   Comment cg with RW; increased visual/tactile cues for sequence/direction/hand placement   Chair/Bed-to-Chair Transfer CARE Score 4   Walk 10 Feet   Type of Assistance Needed Incidental touching;Supervision   Comment dagoberto/close S with RW; visual/tactile cues for sequence/direction; wife completed last 2 walks with cueing from PT on how to cue her    Walk 10 Feet CARE Score 4   Walk 50 Feet with Two Turns   Type of Assistance Needed Incidental touching;Supervision   Comment dagoberto/close S with RW; visual/tactile cues for sequence/direction; wife completed last 2 walks with cueing from PT on how to cue her    Walk 50 Feet with Two Turns CARE Score 4   Walk 150 Feet   Type of Assistance Needed Incidental touching;Supervision   Comment dagoberto/close S with RW;  visual/tactile cues for sequence/direction; wife completed last 2 walks with cueing from PT on how to cue her    Walk 150 Feet CARE Score 4   Walking 10 Feet on Uneven Surfaces   Type of Assistance Needed Incidental touching;Supervision   Comment cg/close S with RW; visual/tactile cues for sequence/direction; wife completed last 2 walks with cueing from PT on how to cue her    Walking 10 Feet on Uneven Surfaces CARE Score 4   Ambulation   Primary Mode of Locomotion Prior to Admission Walk   Distance Walked (feet) 194 ft  (194' 152' 15-20' x 3 around PT gym 152' 137')   Assist Device Roller Walker   Gait Pattern Inconsistant Maria Victoria;Slow Maria Victoria;Decreased foot clearance;Narrow ILIANA;Forward Flexion   Limitations Noted In Balance;Coordination;Device Management;Endurance;Posture;Safety;Strength   Provided Assistance with: Balance;Direction   Walk Assist Level Contact Guard;Close Supervision   Findings cg/close S with RW; visual/tactile cues for sequence/direction; wife completed last 2 walks with cueing from PT on how to cue her    Does the patient walk? 2. Yes   Curb or Single Stair   Style negotiated Single stair   Type of Assistance Needed Supervision   Comment close S using 2 handrails; increased visual/tactile cues for sequence/direction   1 Step (Curb) CARE Score 4   4 Steps   Type of Assistance Needed Supervision   Comment close S using 2 handrails; increased visual/tactile cues for sequence/direction   4 Steps CARE Score 4   12 Steps   Type of Assistance Needed Supervision   Comment close S using 2 handrails; increased visual/tactile cues for sequence/direction   12 Steps CARE Score 4   Stairs   Type Stairs   # of Steps 14   Weight Bearing Precautions WBAT;Fall Risk   Assist Devices Bilateral Rail   Findings close S using 2 handrails; increased visual/tactile cues for sequence/direction   Therapeutic Interventions   Strengthening seated ther ex   Balance gait and transfer training   Other  stair negotiation   Equipment Use   NuStep L1 10 min seat 10 arms 8; increased coordination; general strengthening   Assessment   Treatment Assessment Patient agreeable to therapy session.    No pain reported.   Finley removed this AM.    Continues to require increased visual/tactile cues for sequence, technique, and direction for all tasks.   Patient completed Tinetti Gait and Balance Assessment tool.   Scored 7/16 for balance and 9/12 for gait for total score of  16/28 indicating high fall risk.   Scores may be slightly skewed due to aphasia, impaired hearing, and impaired cognition.   Wife in attendence for  last part of therapy.  Watched patient complete stair negotiation.    Assisted patient with ambulation with increased cues from PT as to how to cue patient for safe ambulation.   Will continue to involve with therapy.   Completed ther ex for general LE strengthening; gait and transfer training focusing on sequence and  technique for improved balance and safety with funcitonal mobility using walker.  Negotiated steps with 2 handrails and cues for sequence.   PT Barriers   Physical Impairment Decreased strength;Decreased range of motion;Decreased endurance;Impaired balance;Decreased mobility;Decreased cognition;Impaired judgement;Decreased safety awareness;Impaired hearing   Functional Limitation Wheelchair management;Walking;Transfers;Standing;Stair negotiation;Ramp negotiation;Car transfers   Plan   Treatment/Interventions Functional transfer training;LE strengthening/ROM;Elevations;Therapeutic exercise;Bed mobility;Gait training   Progress Progressing toward goals   PT Therapy Minutes   PT Time In 1030   PT Time Out 1155   PT Total Time (minutes) 85   PT Mode of treatment - Individual (minutes) 85   PT Mode of treatment - Concurrent (minutes) 0   PT Mode of treatment - Group (minutes) 0   PT Mode of treatment - Co-treat (minutes) 0   PT Mode of Treatment - Total time(minutes) 85 minutes   PT Cumulative  Minutes 7362   Therapy Time missed   Time missed? No

## 2024-03-22 NOTE — PLAN OF CARE
Problem: PAIN - ADULT  Goal: Verbalizes/displays adequate comfort level or baseline comfort level  Description: Interventions:  - Encourage patient to monitor pain and request assistance  - Assess pain using appropriate pain scale  - Administer analgesics based on type and severity of pain and evaluate response  - Implement non-pharmacological measures as appropriate and evaluate response  - Consider cultural and social influences on pain and pain management  - Notify physician/advanced practitioner if interventions unsuccessful or patient reports new pain  Outcome: Progressing     Problem: INFECTION - ADULT  Goal: Absence or prevention of progression during hospitalization  Description: INTERVENTIONS:  - Assess and monitor for signs and symptoms of infection  - Monitor lab/diagnostic results  - Monitor all insertion sites, i.e. indwelling lines, tubes, and drains  - Monitor endotracheal if appropriate and nasal secretions for changes in amount and color  - Muir appropriate cooling/warming therapies per order  - Administer medications as ordered  - Instruct and encourage patient and family to use good hand hygiene technique  - Identify and instruct in appropriate isolation precautions for identified infection/condition  Outcome: Progressing  Goal: Absence of fever/infection during neutropenic period  Description: INTERVENTIONS:  - Monitor WBC    Outcome: Progressing

## 2024-03-22 NOTE — PROGRESS NOTES
Progress Note - Thomas Chase 83 y.o. male MRN: [id number]    Unit/Bed#: [facility] 213-02 Encounter: [id number]        Subjective:   Patient seen and examined at bedside after reviewing the chart and discussing the case with the caring staff.      Patient examined at bedside.  Patient has no acute symptoms.     Patient's brooks removed this morning for voiding trial.    Physical Exam   Vitals: Blood pressure 102/70, pulse 103, temperature 97.5 °F (36.4 °C), temperature source Temporal, resp. rate 17, height 6' (1.829 m), weight 60.4 kg (133 lb 2.5 oz), SpO2 92%.,Body mass index is 18.06 kg/m².  Constitutional: Patient in no acute distress.  HEENT: PERR, EOMI, MMM.  Cardiovascular: Normal rate and regular rhythm.    Pulmonary/Chest: Effort normal and breath sounds normal.   Abdomen: Soft, + BS, NT.    Assessment/Plan:  Thomas Chase is a(n) 83 y.o. year old male with left SDH and right SAH.     1.  Cardiac with hx of HTN, HLD/hypotension.  On pravastatin 40 mg daily (simvastatin nonformulary).  Noted to have low BP 3/10/24.  Patient's orthostatic vitals did not show significant drop.  Received IV fluids and started on midodrine.  Midodrine increased to 5 mg TID 3/21/24.  Continue to monitor.     2.  Allergic rhinitis.  Patient is on loratadine 10 mg daily.  3.  Depression/anxiety.  Patient is on Zoloft 25 mg daily.  4.  Insomnia.  Patient is on melatonin 6 mg at bedtime.  5.  Thrush.  Completed clotrimazole lozenges x 10 days on 3/21/24.   6.  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  Patient is mainly nonverbal.  7.  Urinary retention/enterococcal UTI.  Urinary retention protocol.  Flomax decreased to 0.4 mg daily 3/12/2024 due to possible cause of hypotension.  Brooks catheter placed 2/22/24, removed on 2/28/24, placed again 3/1/24, removed again 3/7/24 and reinserted 3/8/24.    UA was ordered 3/12/24 to rule out UTI as patient was hypotensive with CRP and sed rate were slightly elevated.  Tiger  texted on call ID 3/15/24 regarding urine culture result who recommended no treatment at that time as patient asymptomatic without urinary symptoms or flank pain.  Patient had temp 100.7F later that evening and was started on Levaquin 500 mg daily x 5 days.  8.  Dysphagia.  Speech therapy following.  Dysphagia 1 puureed with nectar thick liquid.  Video swallow study done on 3/11/2024.  9.  Vitamin B12 deficiency.  Patient started on vitamin B12 500 mcg daily.    10.  Pain and bowel management.  As per physiatrist.  11.  Intracranial hemorrhage.   Patient is receiving intensive PT OT ST as per physiatrist.    Anticipated discharge date:  TBD  PCP:  RONY Diaz    The patient was discussed with Dr. Paz and he is in agreement with the above note.

## 2024-03-22 NOTE — PROGRESS NOTES
03/22/24 0700   Pain Assessment   Pain Assessment Tool 0-10   Pain Score No Pain   Restrictions/Precautions   Precautions Aspiration;Bed/chair alarms;Cognitive;Fall Risk;Hard of hearing   Weight Bearing Restrictions No   ROM Restrictions No   Eating   Type of Assistance Needed Supervision   Physical Assistance Level No physical assistance   Comment pt with increased intake and decreased coughing overall after approx 25% of bites   Eating CARE Score 4   Oral Hygiene   Type of Assistance Needed Supervision   Physical Assistance Level No physical assistance   Oral Hygiene CARE Score 4   Grooming   Able To Wash/Dry Face;Comb/Brush Hair;Brush/Clean Teeth   Limitation Noted In Problem Solving;Safety;Sequencing   Shower/Bathe Self   Type of Assistance Needed Physical assistance   Physical Assistance Level 26%-50%   Comment assist for buttocks, bilat lower LE   Shower/Bathe Self CARE Score 3   Bathing   Assessed Bath Style Sponge Bath   Anticipated D/C Bath Style Sponge Bath;Shower   Able to Gather/Transport No   Able to Wash/Rinse/Dry (body part) Left Arm;Right Arm;R Upper Leg;L Upper Leg;Chest;Abdomen;Perineal Area   Limitations Noted in Balance;Endurance;Problem Solving;ROM;Safety;Sequencing   Positioning Seated;Standing   Upper Body Dressing   Type of Assistance Needed Physical assistance   Physical Assistance Level 26%-50%   Comment assist to doff flannel over RUE, don over bilat UE, pull new t-shirt down over head   Upper Body Dressing CARE Score 3   Lower Body Dressing   Type of Assistance Needed Physical assistance   Physical Assistance Level 51%-75%   Comment pt completed more LB tasks this session than in any previous session: able to pull underwear down from hips and buttocks and push almost off to feet, pull clothing to md-thigh when seated after OT donned over feet, pulled clothing over hips/buttocks when standing   Lower Body Dressing CARE Score 2   Putting On/Taking Off Footwear   Type of Assistance Needed  Physical assistance   Physical Assistance Level 51%-75%   Comment pt able to present feet to OT to doff/don footwear   Putting On/Taking Off Footwear CARE Score 2   Dressing/Undressing Clothing   Remove UB Clothes Pullover Shirt;Jacket   Don UB Clothes Pullover Shirt;Jacket   Remove LB Clothes Undergarment;Socks   Don LB Clothes Pants;Undergarment;Socks;TEDs   Limitations Noted In Balance;Endurance;Problem Solving;Safety;Sequencing;Strength;ROM   Positioning Supported Sit   Lying to Sitting on Side of Bed   Type of Assistance Needed Physical assistance   Physical Assistance Level 26%-50%   Lying to Sitting on Side of Bed CARE Score 3   Sit to Stand   Type of Assistance Needed Supervision   Physical Assistance Level No physical assistance   Sit to Stand CARE Score 4   Bed-Chair Transfer   Type of Assistance Needed Incidental touching   Comment CG   Chair/Bed-to-Chair Transfer CARE Score 4   Transfer Bed/Chair/Wheelchair   Limitations Noted In Balance;Endurance;Problem Solving;Sequencing;LE Strength   Toileting   Findings pt's Finley removed, OT offered bathroom multiple times but pt declined   Exercise Tools   Other Exercise Tool 1 card matching activity, placed cards in pockets with R hand and verbalized each number and suit, pt fatigued this AM and made more errors than usual but was easily corrected by OT   Cognition   Overall Cognitive Status Impaired   Arousal/Participation Alert;Responsive;Cooperative   Attention Attends with cues to redirect   Orientation Level Oriented to place;Oriented to person;Oriented to time   Memory Decreased short term memory;Decreased recall of recent events;Decreased recall of precautions   Following Commands Follows one step commands with increased time or repetition   Assessment   Treatment Assessment Pt agreeable to OT session this AM. Received sidelying in bed. Pt did report feeling very tired at start of session which remained evident during session. ADL completed; current LOF  and details listed in respective sections. Pt with improved LB dressing this date: modA overall with physical and visual cues to complete. Pt also Brayan UB dressing and bathing, supervision grooming; fxl transfers overall CG/supervision. Pt completed cognitive activity after ADL with increased time and good tolerance. Supervision required for breakfast tray after ADL with improved appetite and decreased coughing noted. Finley removed earlier this AM, pt offered bathroom multiple times however pt declined.  Pt's barriers to d/c include decreased strength throughout, decreased balance, impaired hearing, impaired cognition including safety awareness, problem solving, attention, comprehension, and expression, and decreased activity tolerance; all affect independence in self care and fxl transfers. Pt would benefit from continued skilled OT services in order to address listed barriers and prepare for safe d/c.   Prognosis Fair   Problem List Decreased strength;Decreased range of motion;Decreased endurance;Impaired balance;Decreased coordination;Decreased cognition;Decreased safety awareness;Impaired hearing   Plan   Treatment/Interventions ADL retraining;Functional transfer training;LE strengthening/ROM;Therapeutic exercise;Endurance training;Patient/family training;Cognitive reorientation;Equipment eval/education;Compensatory technique education;OT   Progress Progressing toward goals   OT Therapy Minutes   OT Time In 0700   OT Time Out 0830   OT Total Time (minutes) 90   OT Mode of treatment - Individual (minutes) 90

## 2024-03-22 NOTE — NUTRITION
03/22/24 1501   Biochemical Data,Medical Tests, and Procedures   Biochemical Data/Medical Tests/Procedures Lab values reviewed;Meds reviewed   Labs (Comment) 3/18/24 Creatinine 0.56, Total Protein 6.2, Albumin 3.2, Hemoglobin 11.9   Meds (Comment) vitamin B12, lovenox, melatonin, proamatine, centrum, pravachol, senokot, zoloft, tylenol, dulcolax, chronulac, miralax   Speech Therapy Recommendations (Comment) Continues to receive speech therapy.   Nutrition-Focused Physical Exam   Nutrition-Focused Physical Exam Findings RN skin assessment reviewed;No edema documented;No skin issues documented   Medical-Related Concerns PMH reviewed.   Adequacy of Intake   Nutrition Modality PO   Feeding Route   PO Independent  (with supervision)   Current PO Intake   Current Diet Order Dysphagia 1, double portions, NTL, extra sauce/gravy.   Nutrition Supplements Magic Cup;Mighty Shake  (Mightyshake TID. Magic cup BID.)   Current Meal Intake %;50-75%   Intake Supplements 50-75%;%   Estimated calorie intake compared to estimated need Nutrient needs are met.   PES Statement   Problem Continue previous diagnosis  (Improving.)   Recommendations/Interventions   Malnutrition/BMI Present Yes  (As previously documented.)   Adult BMI Classifications Underweight < 18.5   Summary Nutrition follow-up assessment. No new weight. 3/18/24 133# BMI 18.06 7# weight gain from admission 2/20/24 126#. No edema. Skin intact. Dysphagia 1, double portions, NTL, extra sauce/gravy. B: oatmeal, yogurt, applesauce, pudding. L/D: yogurt, applesauce, pudding. Mightyshake TID. Magic cup BID. Meal completions 75%. Supplement intake varies.  LBM 3/22. Oriented to person/place/time. Deaf. Per staff patient continues to eat well. Continue diet as prescribed.   Interventions/Recommendations Continue current diet order;Supplement continue   Education Assessment   Education Patient/caregiver not appropriate for education at this time   Patient Nutrition  Goals   Goal Tolerate PO diet;Avoid weight loss;Meet PO needs   Goal Status Met;Extended   Timeframe to complete goal by next f/u   Nutrition Complexity Risk   Nutrition complexity level Moderate risk   Follow up date 03/28/24

## 2024-03-23 PROCEDURE — 97110 THERAPEUTIC EXERCISES: CPT

## 2024-03-23 PROCEDURE — 97116 GAIT TRAINING THERAPY: CPT

## 2024-03-23 PROCEDURE — 97537 COMMUNITY/WORK REINTEGRATION: CPT

## 2024-03-23 RX ADMIN — TAMSULOSIN HYDROCHLORIDE 0.4 MG: 0.4 CAPSULE ORAL at 16:44

## 2024-03-23 RX ADMIN — CYANOCOBALAMIN TAB 500 MCG 500 MCG: 500 TAB at 08:12

## 2024-03-23 RX ADMIN — LORATADINE 10 MG: 10 TABLET ORAL at 08:12

## 2024-03-23 RX ADMIN — PRAVASTATIN SODIUM 40 MG: 40 TABLET ORAL at 16:44

## 2024-03-23 RX ADMIN — Medication 9 MG: at 21:27

## 2024-03-23 RX ADMIN — Medication 1 TABLET: at 08:12

## 2024-03-23 RX ADMIN — MIDODRINE HYDROCHLORIDE 5 MG: 5 TABLET ORAL at 11:54

## 2024-03-23 RX ADMIN — ENOXAPARIN SODIUM 40 MG: 40 INJECTION SUBCUTANEOUS at 08:12

## 2024-03-23 RX ADMIN — MIDODRINE HYDROCHLORIDE 5 MG: 5 TABLET ORAL at 16:44

## 2024-03-23 RX ADMIN — SENNOSIDES 8.6 MG: 8.6 TABLET, FILM COATED ORAL at 21:27

## 2024-03-23 RX ADMIN — MIDODRINE HYDROCHLORIDE 5 MG: 5 TABLET ORAL at 08:12

## 2024-03-23 RX ADMIN — SERTRALINE HYDROCHLORIDE 25 MG: 25 TABLET ORAL at 08:12

## 2024-03-23 NOTE — PROGRESS NOTES
"   03/22/24 8502   Pain Assessment   Pain Assessment Tool 0-10   Pain Score No Pain   Pain Rating: FLACC (Rest) - Face 0   Pain Rating: FLACC (Rest) - Legs 0   Pain Rating: FLACC (Rest) - Activity 0   Pain Rating: FLACC (Rest) - Cry 0   Pain Rating: FLACC (Rest) - Consolability 0   Score: FLACC (Rest) 0   Speech/Language/Cognition Assessmetn   Treatment Assessment Pt did independently request to go to the bathroom in session \"go poop\" with intelligible speech. Tactile cueing to complete transfer while in the bathroom and to sit. Pt quick to stand back up but benefitting from cueing for extended time. Unsuccessful bowl movement. Pt with unintelligible speech in response to questions regarding toileting.   Swallow Assessment   Swallow Treatment Assessment assessed across meal tray trialing larger bolus sizes of liquids. Completed exercise of effortful swallow ~10% of the time correctly despite cueing.Unsuccessful at completed EMST or further swallow exercise due to complexity. As tray continued, towards the end increased swallow fatigue observed charcterized by significant delay in the swallow requirng multiple cues to swallow. If he tried to talk prior to swallow, wet vocal quality observed. Vocal quality returned clear following swallow initiation. Patient attempted 1-2 words at times towards end of session intelligible ~30-40%; however, increased intelligibility up to 90% with patient's attempt to repeat SLP up to 4 words spontaneously. Completed 75% of his meal tray including 4oz of honey thick liquids and 4 oz of nectar thick liquids. Coughing inconsistent throughout entire meal tray across nectar single straw sips x4, nectar via cup x4, pureed solids x3, and honey thick liquids x4. Cough severity variable through meal. Pt remains at a very high aspiration risk with all textures of food/liquid as observed across meals. He does continue to medically tolerate this diet as far as has yet to develop aspiration PNA. " Family has been educated at length regarding patient's aspiration risk especially with known results from his most recent VBS as well as patient's inconsistent presentation through meals. Days of tolerance and days of intolerance for puree/NTL diet. Risk for dehydration continues . PEG recommendations have also been discussed with family. Continue swallow therapy to maximize safety with eating/drinking and working towards increasing bolus amount safely.   SLP Therapy Minutes   SLP Time In 1245   SLP Time Out 1315   SLP Total Time (minutes) 30   SLP Mode of treatment - Individual (minutes) 30   SLP Mode of treatment - Concurrent (minutes) 0   SLP Mode of treatment - Group (minutes) 0   SLP Mode of treatment - Co-treat (minutes) 0   SLP Mode of Treatment - Total time(minutes) 30 minutes   SLP Cumulative Minutes 1050

## 2024-03-23 NOTE — NURSING NOTE
Patient stated to spouse that he needed to void.  Assisted to toilet.  Brief was slightly wet with small/moderate incontinence.  Did not void on toilet with this attempt.  Bladder scan = 461 cc.  Patient appears to be free for signs of pain or discomfort related to same. Addressed same with spouse who stated she would like to give him more to try to void and get back to baseline prior to recent hospitalization MD updated on same and directed to continue protocol at this time and cath PRN for scans >450.  .

## 2024-03-23 NOTE — PROGRESS NOTES
03/23/24 0830   Pain Assessment   Maciel-Rose FACES Pain Rating 0   Restrictions/Precautions   Precautions Aphasia;Aspiration;Bed/chair alarms;Cognitive;Fall Risk;Hard of hearing;Impulsive;Supervision on toilet/commode   Weight Bearing Restrictions No   ROM Restrictions No   Cognition   Overall Cognitive Status Impaired   Arousal/Participation Alert;Responsive;Cooperative   Attention Attends with cues to redirect   Orientation Level Oriented to person;Oriented to place   Memory Decreased short term memory;Decreased recall of recent events;Decreased recall of precautions   Following Commands Follows one step commands with increased time or repetition   Sit to Stand   Type of Assistance Needed Supervision   Sit to Stand CARE Score 4   Car Transfer   Reason if not Attempted Environmental limitations   Car Transfer CARE Score 10   Walk 10 Feet   Type of Assistance Needed Incidental touching   Physical Assistance Level No physical assistance   Comment CG   Walk 10 Feet CARE Score 4   Walk 50 Feet with Two Turns   Type of Assistance Needed Incidental touching   Physical Assistance Level No physical assistance   Comment CG   Walk 50 Feet with Two Turns CARE Score 4   Walk 150 Feet   Type of Assistance Needed Incidental touching   Physical Assistance Level No physical assistance   Comment CG   Walk 150 Feet CARE Score 4   Ambulation   Primary Mode of Locomotion Prior to Admission Walk   Distance Walked (feet) 150 ft   Assist Device Roller Walker   Gait Pattern Inconsistant Maria Victoria;Decreased foot clearance;Forward Flexion;Narrow ILIANA;Step through   Limitations Noted In Balance;Endurance;Posture;Safety;Speed;Strength   Provided Assistance with: Direction   Walk Assist Level Contact Guard   Does the patient walk? 2. Yes   Curb or Single Stair   Style negotiated Single stair   Type of Assistance Needed Incidental touching   Physical Assistance Level No physical assistance   Comment CG   1 Step (Curb) CARE Score 4   4 Steps    Type of Assistance Needed Incidental touching   Physical Assistance Level No physical assistance   Comment CG   4 Steps CARE Score 4   12 Steps   Type of Assistance Needed Incidental touching   Physical Assistance Level No physical assistance   Comment CG   12 Steps CARE Score 4   Therapeutic Interventions   Strengthening seated ther ex   Balance gait, transfers   Other stairs   Assessment   Treatment Assessment Pt seated in recliner chair to start session, no complaints noted. Pt able to complete 150' with RW and CG. Completed all seated ther ex with VC and minimall AAROM to start exercise. Pt able to navigate 12 stairs with CG and BHR. Pt seated in chair to end session with call bell in reach and all needs met. Pt will benefit from continued skilled PT to improve over all stregnth and endurence.   PT Barriers   Physical Impairment Decreased strength;Decreased range of motion;Decreased endurance;Impaired balance;Decreased mobility;Decreased cognition;Impaired judgement;Decreased safety awareness;Impaired hearing   Functional Limitation Wheelchair management;Walking;Transfers;Standing;Stair negotiation;Ramp negotiation;Car transfers   Plan   Treatment/Interventions Functional transfer training;LE strengthening/ROM;Elevations;Therapeutic exercise;Endurance training;Bed mobility;Gait training   Progress Progressing toward goals   PT Therapy Minutes   PT Time In 0830   PT Time Out 0930   PT Total Time (minutes) 60   PT Mode of treatment - Individual (minutes) 60   PT Mode of treatment - Concurrent (minutes) 0   PT Mode of treatment - Group (minutes) 0   PT Mode of treatment - Co-treat (minutes) 0   PT Mode of Treatment - Total time(minutes) 60 minutes   PT Cumulative Minutes 2207   Therapy Time missed   Time missed? No

## 2024-03-23 NOTE — PLAN OF CARE
Problem: INFECTION - ADULT  Goal: Absence or prevention of progression during hospitalization  Description: INTERVENTIONS:  - Assess and monitor for signs and symptoms of infection  - Monitor lab/diagnostic results  - Monitor all insertion sites, i.e. indwelling lines, tubes, and drains  - Monitor endotracheal if appropriate and nasal secretions for changes in amount and color  - [place]hler appropriate cooling/warming therapies per order  - Administer medications as ordered  - Instruct and encourage patient and family to use good hand hygiene technique  - Identify and instruct in appropriate isolation precautions for identified infection/condition  Outcome: Progressing

## 2024-03-23 NOTE — NURSING NOTE
Order for brooks to be placed attempted to place brooks could not get past prostate.Bladder scan volume of  587. Notified DrCar Will attempt reinsertion of brooks in Am. Pt currently resting in bed call bell with in reach plan of care ongoing.

## 2024-03-23 NOTE — NURSING NOTE
Patient incontinent of large amount to urine.  Bladder scan =607.  Wife at bedside. Patient assisted to BR to attempt to void further.

## 2024-03-23 NOTE — NURSING NOTE
Patient did not void with prior attempt.  MD notified.  Will attempt void again after lunch.  Patient appears in to distress. Visiting with spouse at bedside.

## 2024-03-24 PROCEDURE — 97530 THERAPEUTIC ACTIVITIES: CPT

## 2024-03-24 PROCEDURE — 97116 GAIT TRAINING THERAPY: CPT

## 2024-03-24 PROCEDURE — 97112 NEUROMUSCULAR REEDUCATION: CPT

## 2024-03-24 PROCEDURE — NC001 PR NO CHARGE: Performed by: PHYSICAL MEDICINE & REHABILITATION

## 2024-03-24 RX ADMIN — CYANOCOBALAMIN TAB 500 MCG 500 MCG: 500 TAB at 08:23

## 2024-03-24 RX ADMIN — Medication 9 MG: at 21:00

## 2024-03-24 RX ADMIN — Medication 1 TABLET: at 08:23

## 2024-03-24 RX ADMIN — ACETAMINOPHEN 650 MG: 325 TABLET ORAL at 20:54

## 2024-03-24 RX ADMIN — TAMSULOSIN HYDROCHLORIDE 0.4 MG: 0.4 CAPSULE ORAL at 16:08

## 2024-03-24 RX ADMIN — MIDODRINE HYDROCHLORIDE 5 MG: 5 TABLET ORAL at 08:23

## 2024-03-24 RX ADMIN — PRAVASTATIN SODIUM 40 MG: 40 TABLET ORAL at 16:08

## 2024-03-24 RX ADMIN — SERTRALINE HYDROCHLORIDE 25 MG: 25 TABLET ORAL at 08:23

## 2024-03-24 RX ADMIN — LORATADINE 10 MG: 10 TABLET ORAL at 08:23

## 2024-03-24 RX ADMIN — SENNOSIDES 8.6 MG: 8.6 TABLET, FILM COATED ORAL at 21:00

## 2024-03-24 RX ADMIN — MIDODRINE HYDROCHLORIDE 5 MG: 5 TABLET ORAL at 16:08

## 2024-03-24 RX ADMIN — MIDODRINE HYDROCHLORIDE 5 MG: 5 TABLET ORAL at 12:10

## 2024-03-24 RX ADMIN — ENOXAPARIN SODIUM 40 MG: 40 INJECTION SUBCUTANEOUS at 09:42

## 2024-03-24 NOTE — NURSING NOTE
Patient voided spontaneously in toilet for 200 ml.  Bladder scan = 332.  MD notified.  Advised to hold off on inserting brooks catheter for now and monitor voids.

## 2024-03-24 NOTE — NURSING NOTE
Patient assisted to BR and voided 200 ml spontaneously.  Bladder scan= 239.  MD made aware. Will not proceed with Finley catheter at this time and continue to monitor voids.

## 2024-03-24 NOTE — PROGRESS NOTES
PM&R Brief Note:    Voiding trial continues as this is what patient and family prefer.  Overall volumes have been moderate 400-500cc, however patient voiding in between some -- Patient is having spontaneous incontinent episodes in between and 1-2 continent voids.     Straight catheterization can be difficult at times due to BPH.      Ok to continue urinary retention protocol and TOV as ordered.  Straight cath if persistent volume > 450 cc.  Can attempt to double void and attempt urination prior to catheterizations.      For now hold off on replacement of brooks catheter.      Alexandrea Lugo MD  PM&R

## 2024-03-24 NOTE — NURSING NOTE
Patient assited to BR but did not void.  Bladder scan completed was 561.  Attempt to straight cath unsuccessful due to meeting resistance with insertion . Md notified.  Order received for brooks insertion today.

## 2024-03-24 NOTE — PROGRESS NOTES
"   03/24/24 0957   Pain Assessment   Pain Assessment Tool 0-10   Pain Score No Pain   Restrictions/Precautions   Precautions Aphasia;Aspiration;Bed/chair alarms;Cognitive;Fall Risk;Hard of hearing;Impulsive;Supervision on toilet/commode   Weight Bearing Restrictions No   ROM Restrictions No   Cognition   Overall Cognitive Status Impaired   Arousal/Participation Alert;Responsive;Cooperative   Attention Attends with cues to redirect   Orientation Level Oriented to person;Oriented to place   Memory Decreased recall of precautions;Decreased short term memory;Decreased recall of recent events   Following Commands Follows one step commands with increased time or repetition   Subjective   Subjective Pt shook his head from white board question of \"do you have any pain?\"   Roll Left and Right   Comment Pt OOB   Sit to Lying   Comment Pt OOB   Lying to Sitting on Side of Bed   Comment Pt OOB   Sit to Stand   Type of Assistance Needed Supervision   Physical Assistance Level No physical assistance   Comment Cl S RW; visual/tactile cueing for hand placement   Sit to Stand CARE Score 4   Bed-Chair Transfer   Type of Assistance Needed Incidental touching   Physical Assistance Level No physical assistance   Comment CGA RW; tactile/visual cues for sequencing and direction   Chair/Bed-to-Chair Transfer CARE Score 4   Car Transfer   Reason if not Attempted Environmental limitations   Car Transfer CARE Score 10   Walk 10 Feet   Type of Assistance Needed Incidental touching   Physical Assistance Level No physical assistance   Comment CGA / Cl S RW; Visual/tactile cues for direction   Walk 10 Feet CARE Score 4   Walk 50 Feet with Two Turns   Type of Assistance Needed Incidental touching   Physical Assistance Level No physical assistance   Comment CGA / Cl S RW; Visual/tactile cues for direction   Walk 50 Feet with Two Turns CARE Score 4   Walk 150 Feet   Type of Assistance Needed Incidental touching   Physical Assistance Level No " physical assistance   Comment CGA / Cl S RW; Visual/tactile cues for direction   Walk 150 Feet CARE Score 4   Walking 10 Feet on Uneven Surfaces   Type of Assistance Needed Incidental touching   Physical Assistance Level No physical assistance   Comment green foam   Walking 10 Feet on Uneven Surfaces CARE Score 4   Ambulation   Primary Mode of Locomotion Prior to Admission Walk   Assist Device Roller Walker   Gait Pattern Inconsistant Maria Victoria;Decreased foot clearance;Forward Flexion;Narrow ILIANA   Limitations Noted In Balance;Device Management;Endurance;Heel Strike;Posture;Safety;Speed;Strength   Provided Assistance with: Direction   Walk Assist Level Contact Guard;Close Supervision   Findings CGA / Cl S RW; Visual/tactile cues for direction   Does the patient walk? 2. Yes   Wheel 50 Feet with Two Turns   Reason if not Attempted Activity not applicable   Wheel 50 Feet with Two Turns CARE Score 9   Wheel 150 Feet   Reason if not Attempted Activity not applicable   Wheel 150 Feet CARE Score 9   Wheelchair mobility   Findings N/A   Curb or Single Stair   Style negotiated Single stair   Type of Assistance Needed Supervision   Physical Assistance Level No physical assistance   Comment Cl S up/down 14 steps with B/L HR; visual/tactile cues for sequencing   1 Step (Curb) CARE Score 4   4 Steps   Type of Assistance Needed Supervision   Physical Assistance Level No physical assistance   Comment Cl S up/down 14 steps with B/L HR; visual/tactile cues for sequencing   4 Steps CARE Score 4   12 Steps   Type of Assistance Needed Supervision   Physical Assistance Level No physical assistance   Comment Cl S up/down 14 steps with B/L HR; visual/tactile cues for sequencing   12 Steps CARE Score 4   Stairs   Type Ramp   Weight Bearing Precautions WBAT;Fall Risk   Assist Devices Roller Walker   Findings CGA RW; visual/tactile cues for sequencing   Picking Up Object   Type of Assistance Needed Incidental touching   Physical Assistance  "Level No physical assistance   Comment CGA R UE  balls from floor in PT gym, visual/tactile cueing for sequencing   Picking Up Object CARE Score 4   Toilet Transfer   Comment Pt did not require during session   Therapeutic Interventions   Neuromuscular Re-Education Picking up objects around PT gym, transfer training from various surfaces focusing on sequencing   Other transfer training, gait training, stair training, ramp negotiation   Assessment   Treatment Assessment Pt agreeable to PT with initiation of stand after reading \"do you want to walk?\" on white board, received sitting upright in recliner. Pt able to  objects from floor with one UE on RW for support without physical assist, requiring tactile/visual cueing for sequencing. Pt still unable to demonstrate consistent transfer sequencing without visual/tactile cueing, especially on novel sitting surfaces such as couch and lower seats. Will continue with current PT POC to improve deficits and promote return to PLOF.   Problem List Decreased strength;Decreased range of motion;Decreased endurance;Impaired balance;Decreased coordination;Decreased cognition;Decreased safety awareness;Impaired hearing   PT Barriers   Physical Impairment Decreased strength;Decreased range of motion;Decreased endurance;Impaired balance;Decreased mobility;Decreased cognition;Impaired judgement;Decreased safety awareness;Impaired hearing   Functional Limitation Wheelchair management;Walking;Transfers;Standing;Stair negotiation;Ramp negotiation;Car transfers   Plan   Treatment/Interventions Functional transfer training;LE strengthening/ROM;Therapeutic exercise;Endurance training;Cognitive reorientation;Patient/family training;Equipment eval/education;Bed mobility;Gait training   Progress Progressing toward goals   PT Therapy Minutes   PT Time In 0930   PT Time Out 1023   PT Total Time (minutes) 53   PT Mode of treatment - Individual (minutes) 53   PT Mode of treatment - " Concurrent (minutes) 0   PT Mode of treatment - Group (minutes) 0   PT Mode of treatment - Co-treat (minutes) 0   PT Mode of Treatment - Total time(minutes) 53 minutes   PT Cumulative Minutes 2200     Patient remains OOB in chair, all needs in reach.  Alarm in place and activated.  Encouraged use of call bell, patient verbalizes understanding.

## 2024-03-24 NOTE — NURSING NOTE
Pt bladder scanned at 470 ml. Straight cathed with 15 Iraqi cath per Dr orders < 450 ml. Pt tolerated fairly well.

## 2024-03-24 NOTE — NURSING NOTE
Pt walked with TIM tran around unit with this nurse and wife. Pt sat down on toilet and double void-bladder scan 842 ml. Pt attempted to void again, unsuccessful. Reached out to on call rehab . Per  to attempt void and scan in 1 hr. Continuing to monitor. Bed alarmed.

## 2024-03-24 NOTE — NURSING NOTE
Pt voided 50 ml- ml. Pt has had BM since last bladder scan-tiger connected Dr. Lugo above-per  to continue trial. Continuing to monitor, bed alarm intact.

## 2024-03-25 LAB
ALBUMIN SERPL BCP-MCNC: 3.1 G/DL (ref 3.5–5)
ALP SERPL-CCNC: 88 U/L (ref 34–104)
ALT SERPL W P-5'-P-CCNC: 26 U/L (ref 7–52)
ANION GAP SERPL CALCULATED.3IONS-SCNC: 9 MMOL/L (ref 4–13)
AST SERPL W P-5'-P-CCNC: 17 U/L (ref 13–39)
BASOPHILS # BLD AUTO: 0.03 THOUSANDS/ÂΜL (ref 0–0.1)
BASOPHILS NFR BLD AUTO: 1 % (ref 0–1)
BILIRUB SERPL-MCNC: 0.32 MG/DL (ref 0.2–1)
BUN SERPL-MCNC: 16 MG/DL (ref 5–25)
CALCIUM ALBUM COR SERPL-MCNC: 9.5 MG/DL (ref 8.3–10.1)
CALCIUM SERPL-MCNC: 8.8 MG/DL (ref 8.4–10.2)
CHLORIDE SERPL-SCNC: 103 MMOL/L (ref 96–108)
CO2 SERPL-SCNC: 27 MMOL/L (ref 21–32)
CREAT SERPL-MCNC: 0.58 MG/DL (ref 0.6–1.3)
EOSINOPHIL # BLD AUTO: 0.11 THOUSAND/ÂΜL (ref 0–0.61)
EOSINOPHIL NFR BLD AUTO: 2 % (ref 0–6)
ERYTHROCYTE [DISTWIDTH] IN BLOOD BY AUTOMATED COUNT: 13.9 % (ref 11.6–15.1)
GFR SERPL CREATININE-BSD FRML MDRD: 93 ML/MIN/1.73SQ M
GLUCOSE SERPL-MCNC: 82 MG/DL (ref 65–140)
HCT VFR BLD AUTO: 37 % (ref 36.5–49.3)
HGB BLD-MCNC: 11.7 G/DL (ref 12–17)
IMM GRANULOCYTES # BLD AUTO: 0.02 THOUSAND/UL (ref 0–0.2)
IMM GRANULOCYTES NFR BLD AUTO: 0 % (ref 0–2)
LYMPHOCYTES # BLD AUTO: 1.14 THOUSANDS/ÂΜL (ref 0.6–4.47)
LYMPHOCYTES NFR BLD AUTO: 20 % (ref 14–44)
MCH RBC QN AUTO: 31 PG (ref 26.8–34.3)
MCHC RBC AUTO-ENTMCNC: 31.6 G/DL (ref 31.4–37.4)
MCV RBC AUTO: 98 FL (ref 82–98)
MONOCYTES # BLD AUTO: 0.46 THOUSAND/ÂΜL (ref 0.17–1.22)
MONOCYTES NFR BLD AUTO: 8 % (ref 4–12)
NEUTROPHILS # BLD AUTO: 3.87 THOUSANDS/ÂΜL (ref 1.85–7.62)
NEUTS SEG NFR BLD AUTO: 69 % (ref 43–75)
NRBC BLD AUTO-RTO: 0 /100 WBCS
PLATELET # BLD AUTO: 394 THOUSANDS/UL (ref 149–390)
PMV BLD AUTO: 9.5 FL (ref 8.9–12.7)
POTASSIUM SERPL-SCNC: 4.2 MMOL/L (ref 3.5–5.3)
PROT SERPL-MCNC: 6.5 G/DL (ref 6.4–8.4)
RBC # BLD AUTO: 3.78 MILLION/UL (ref 3.88–5.62)
SODIUM SERPL-SCNC: 139 MMOL/L (ref 135–147)
WBC # BLD AUTO: 5.63 THOUSAND/UL (ref 4.31–10.16)

## 2024-03-25 PROCEDURE — 99233 SBSQ HOSP IP/OBS HIGH 50: CPT | Performed by: PHYSICAL MEDICINE & REHABILITATION

## 2024-03-25 PROCEDURE — 92526 ORAL FUNCTION THERAPY: CPT | Performed by: NURSE PRACTITIONER

## 2024-03-25 PROCEDURE — 85025 COMPLETE CBC W/AUTO DIFF WBC: CPT

## 2024-03-25 PROCEDURE — 97530 THERAPEUTIC ACTIVITIES: CPT

## 2024-03-25 PROCEDURE — 80053 COMPREHEN METABOLIC PANEL: CPT

## 2024-03-25 PROCEDURE — 97129 THER IVNTJ 1ST 15 MIN: CPT

## 2024-03-25 PROCEDURE — 92507 TX SP LANG VOICE COMM INDIV: CPT | Performed by: NURSE PRACTITIONER

## 2024-03-25 PROCEDURE — 97535 SELF CARE MNGMENT TRAINING: CPT

## 2024-03-25 PROCEDURE — 97110 THERAPEUTIC EXERCISES: CPT

## 2024-03-25 PROCEDURE — 97116 GAIT TRAINING THERAPY: CPT

## 2024-03-25 PROCEDURE — 97537 COMMUNITY/WORK REINTEGRATION: CPT

## 2024-03-25 RX ADMIN — SERTRALINE HYDROCHLORIDE 25 MG: 25 TABLET ORAL at 09:31

## 2024-03-25 RX ADMIN — TAMSULOSIN HYDROCHLORIDE 0.4 MG: 0.4 CAPSULE ORAL at 16:38

## 2024-03-25 RX ADMIN — Medication 9 MG: at 21:05

## 2024-03-25 RX ADMIN — MIDODRINE HYDROCHLORIDE 5 MG: 5 TABLET ORAL at 09:31

## 2024-03-25 RX ADMIN — MIDODRINE HYDROCHLORIDE 5 MG: 5 TABLET ORAL at 12:05

## 2024-03-25 RX ADMIN — CYANOCOBALAMIN TAB 500 MCG 500 MCG: 500 TAB at 09:31

## 2024-03-25 RX ADMIN — PRAVASTATIN SODIUM 40 MG: 40 TABLET ORAL at 16:38

## 2024-03-25 RX ADMIN — SENNOSIDES 8.6 MG: 8.6 TABLET, FILM COATED ORAL at 21:05

## 2024-03-25 RX ADMIN — MIDODRINE HYDROCHLORIDE 5 MG: 5 TABLET ORAL at 16:38

## 2024-03-25 RX ADMIN — ENOXAPARIN SODIUM 40 MG: 40 INJECTION SUBCUTANEOUS at 09:40

## 2024-03-25 RX ADMIN — LORATADINE 10 MG: 10 TABLET ORAL at 09:31

## 2024-03-25 RX ADMIN — Medication 1 TABLET: at 09:31

## 2024-03-25 NOTE — PROGRESS NOTES
03/25/24 0653   Pain Assessment   Pain Assessment Tool 0-10   Pain Score No Pain   Restrictions/Precautions   Precautions Aphasia;Aspiration;Bed/chair alarms;Cognitive;Fall Risk;Hard of hearing;Impulsive;Supervision on toilet/commode   Cognition   Overall Cognitive Status Impaired   Arousal/Participation Alert;Responsive;Cooperative   Attention Attends with cues to redirect   Orientation Level Oriented to person;Oriented to place   Memory Decreased short term memory;Decreased recall of recent events;Decreased recall of precautions   Following Commands Follows one step commands with increased time or repetition   Roll Left and Right   Type of Assistance Needed Supervision   Comment bed rails   Roll Left and Right CARE Score 4   Lying to Sitting on Side of Bed   Type of Assistance Needed Supervision   Comment close S; bed rails; increased visual/tactile cues   Lying to Sitting on Side of Bed CARE Score 4   Sit to Stand   Type of Assistance Needed Supervision   Comment close S with RW; increased visual/tactile cues for hand placement   Sit to Stand CARE Score 4   Bed-Chair Transfer   Type of Assistance Needed Incidental touching   Comment cg SPT with RW; increased visual/tactile cues for sequence/direction   Chair/Bed-to-Chair Transfer CARE Score 4   Walk 10 Feet   Type of Assistance Needed Incidental touching   Comment cg level and unlevel surfaces with RW; increased visual/tactile cues for sequence/direction   Walk 10 Feet CARE Score 4   Walk 50 Feet with Two Turns   Type of Assistance Needed Incidental touching   Comment cg level and unlevel surfaces with RW; increased visual/tactile cues for sequence/direction   Walk 50 Feet with Two Turns CARE Score 4   Walk 150 Feet   Type of Assistance Needed Incidental touching   Comment cg level and unlevel surfaces with RW; increased visual/tactile cues for sequence/direction   Walk 150 Feet CARE Score 4   Walking 10 Feet on Uneven Surfaces   Type of Assistance Needed  Incidental touching   Comment cg level and unlevel surfaces with RW; increased visual/tactile cues for sequence/direction   Walking 10 Feet on Uneven Surfaces CARE Score 4   Ambulation   Primary Mode of Locomotion Prior to Admission Walk   Distance Walked (feet) 157 ft  (18' x 1(out of bathroom) 135' 157')   Assist Device Roller Walker   Gait Pattern Inconsistant Maria Victoria;Slow Maria Victoria;Decreased foot clearance;Forward Flexion;Narrow ILIANA   Limitations Noted In Balance;Device Management;Endurance;Heel Strike;Posture;Safety;Speed;Strength   Provided Assistance with: Balance;Direction   Walk Assist Level Contact Guard   Findings cg level and unlevel surfaces with RW; increased visual/tactile cues for sequence/direction   Does the patient walk? 2. Yes   Toilet Transfer   Type of Assistance Needed Incidental touching   Comment cg using RW/grab bar; increased visual/tactile cues for sequence/hand placement   Toilet Transfer CARE Score 4   Therapeutic Interventions   Strengthening supine and seated ther ex   Balance gait and transfer training   Assessment   Treatment Assessment Patient agreeable to therapy session.  No pain reported.   Slight incontinence of bladder in brief, however, was able to urinate on commode.   Nursing aware.   Continues to require increased visual/tactile cues for sequence/technique and direction of all tasks.   Completed ther ex for general LE strengthening; gait and transfer training focusing on sequence and technique for improved balance and safety with functional mobility using walker.  Will see patient in PM for training with wife.   PT Barriers   Physical Impairment Decreased strength;Decreased range of motion;Decreased endurance;Impaired balance;Decreased mobility;Decreased cognition;Impaired judgement;Decreased safety awareness;Impaired hearing   Functional Limitation Wheelchair management;Walking;Transfers;Standing;Stair negotiation;Ramp negotiation;Car transfers   Plan    Treatment/Interventions Functional transfer training;LE strengthening/ROM;Therapeutic exercise;Bed mobility;Gait training   Progress Progressing toward goals   PT Therapy Minutes   PT Time In 0653   PT Time Out 0810   PT Total Time (minutes) 77   PT Mode of treatment - Individual (minutes) 77   PT Mode of treatment - Concurrent (minutes) 0   PT Mode of treatment - Group (minutes) 0   PT Mode of treatment - Co-treat (minutes) 0   PT Mode of Treatment - Total time(minutes) 77 minutes   PT Cumulative Minutes 2187   Therapy Time missed   Time missed? No

## 2024-03-25 NOTE — NURSING NOTE
Pt. With no urge to void at 2330. Bladder scanned for 224ml. Pt advised via white board to call if needs to pee.

## 2024-03-25 NOTE — NURSING NOTE
Pt. Checked at 2am and brief soaked overflowing on bottom sheet. Pt. Did not need to use bathroom. Bladder hihk=102.  05 Am pt. Called indicated he need to get up. Depends soaked but pt. Walked to bathroom via R/W and contact guard. After about 3 min. Pt. Did start to void. He voided 100ml tiana cloudy urine. Bladder scan = 188

## 2024-03-25 NOTE — NURSING NOTE
Per OT, left room to get supplies for bathing and asked patient if he needed to use BR prior to leaving room briefly while oob in reclining chair. Chair alarm then went off, nursing upon entering room saw patient standing wandering towards BR. Hand held assist patient rest of the way to BR and sit down. Patient voided 100ml urine on toilet, presently completing OT. . Will rescan later.

## 2024-03-25 NOTE — PROGRESS NOTES
03/25/24 1030   Pain Assessment   Pain Assessment Tool 0-10   Pain Score No Pain   Restrictions/Precautions   Precautions Aspiration;Bed/chair alarms;Cognitive;Fall Risk;Hard of hearing;Impulsive;Supervision on toilet/commode   Weight Bearing Restrictions No   ROM Restrictions No   Grooming   Able To Comb/Brush Hair;Wash/Dry Face   Limitation Noted In Problem Solving;Sequencing;Safety   Shower/Bathe Self   Type of Assistance Needed Physical assistance   Physical Assistance Level 26%-50%   Comment assist for bilat lower LE, buttocks, perineal thoroughness; visual and physical cues throughout to sequence   Shower/Bathe Self CARE Score 3   Bathing   Assessed Bath Style Sponge Bath   Anticipated D/C Bath Style Sponge Bath;Shower   Able to Gather/Transport No   Able to Wash/Rinse/Dry (body part) Left Arm;Right Arm;L Upper Leg;R Upper Leg;Chest;Abdomen   Limitations Noted in Balance;Endurance;Problem Solving;ROM;Safety;Sequencing   Positioning Seated;Standing   Upper Body Dressing   Type of Assistance Needed Physical assistance   Physical Assistance Level 25% or less   Comment assist to initiate doffing flannel and t-shirt, align new t-shirt with UE to don, don new flannel   Upper Body Dressing CARE Score 3   Lower Body Dressing   Type of Assistance Needed Physical assistance   Physical Assistance Level 76% or more   Comment able to push R side of clothing down over hip, pull pants and underwear over hips and buttocks when standing   Lower Body Dressing CARE Score 2   Putting On/Taking Off Footwear   Type of Assistance Needed Physical assistance   Physical Assistance Level 76% or more   Comment able to present bilat feet to OT to doff/don footwear   Putting On/Taking Off Footwear CARE Score 2   Dressing/Undressing Clothing   Remove UB Clothes Pullover Shirt;Jacket   Don UB Clothes Pullover Shirt;Jacket   Remove LB Clothes Pants;Undergarment;Socks   Don LB Clothes Pants;Undergarment;Socks;TEDs   Limitations Noted In  Balance;Endurance;Problem Solving;Safety;Sequencing;ROM   Positioning Supported Sit   Sit to Stand   Type of Assistance Needed Supervision   Physical Assistance Level No physical assistance   Sit to Stand CARE Score 4   Toileting Hygiene   Type of Assistance Needed Physical assistance   Physical Assistance Level 76% or more   Comment assist required for clothing management   Toileting Hygiene CARE Score 2   Toileting   Able to Pull Clothing down no, up no.   Manage Hygiene Bladder   Limitations Noted In Balance;Problem Solving;ROM;Safety;Sequencing;LE Strength   Adaptive Equipment Grab Bar   Toilet Transfer   Type of Assistance Needed Supervision   Physical Assistance Level No physical assistance   Toilet Transfer CARE Score 4   Toilet Transfer   Surface Assessed Raised Toilet   Transfer Technique Standard   Limitations Noted In Balance;Endurance;Problem Solving;Safety;Sequencing;LE Strength   Adaptive Equipment Grab Bar;Walker   Exercise Tools   Other Exercise Tool 1 popsicle stick match using R hand to place sticks in matching color slots, OT gave visual cues as needed to keep track of sticks placed although able to complete approx 50% of trials without cues   Other Exercise Tool 2 tied and untied knots in green tube, visual cues to untie knots correctly with physical assist from OT to start untying   Cognition   Overall Cognitive Status Impaired   Arousal/Participation Alert;Responsive;Cooperative   Attention Attends with cues to redirect   Orientation Level Oriented to person;Oriented to place;Oriented to time   Memory Decreased short term memory;Decreased recall of recent events;Decreased recall of precautions   Following Commands Follows one step commands with increased time or repetition   Assessment   Treatment Assessment Pt agreeable to OT session this AM. Received sitting in recliner. Pt completed fine motor, cognitive, and activity tolerance activities prior to ADL with fair tolerance and increased time  required for all tasks. Pt used visual and physical cues from OT to correctly complete activities, although completed popsicle stick activity with less cues than in previous trials. ADL session then completed; current LOF and details listed in respective sections. Pt is overall maxA toileting and LB dressing, Brayan bathing and UB dressing; physical and visual cues required to initiate parts of tasks including managing pants. Pt with one episode of impulsivity when OT stepped out of room to collect towels, RN found pt walking to bathroom without RW. Pt's wife present for last few minutes of session; family training scheduled for tomorrow late morning. Pt's barriers to d/c include decreased strength throughout, decreased balance, impaired hearing, impaired cognition including safety awareness, problem solving, attention, comprehension, and expression, and decreased activity tolerance; all affect independence in self care and fxl transfers. Pt would benefit from continued skilled OT services in order to address listed barriers and prepare for safe d/c.   Prognosis Fair   Problem List Decreased strength;Decreased range of motion;Decreased endurance;Impaired balance;Decreased coordination;Decreased cognition;Decreased safety awareness;Impaired hearing   Plan   Treatment/Interventions ADL retraining;Functional transfer training;LE strengthening/ROM;Therapeutic exercise;Endurance training;Cognitive reorientation;Patient/family training;Equipment eval/education;Compensatory technique education;OT   Progress Progressing toward goals   OT Therapy Minutes   OT Time In 1030   OT Time Out 1200   OT Total Time (minutes) 90   OT Mode of treatment - Individual (minutes) 90

## 2024-03-25 NOTE — NURSING NOTE
Pulling meds from machine on patient when heard chair alarming. Patient found standing and walking towards doorway of room without assistive device. Assisted patient hand held to bathroom, but then patient declined having to urinate. Assisted cg with RW back into bed. Bed alarm on bed set as well as bed pad alarm in place. Patient instructed to use call bell when getting oob. Call bell within reach. Will follow.

## 2024-03-25 NOTE — PROGRESS NOTES
03/25/24 1248   Pain Assessment   Pain Assessment Tool 0-10   Pain Score No Pain   Restrictions/Precautions   Precautions Aphasia;Aspiration;Bed/chair alarms;Cognitive;Fall Risk;Hard of hearing;Impulsive;Supervision on toilet/commode   Cognition   Overall Cognitive Status Impaired   Arousal/Participation Alert;Responsive;Cooperative   Attention Attends with cues to redirect   Orientation Level Oriented to person;Oriented to place   Memory Decreased short term memory;Decreased recall of recent events;Decreased recall of precautions   Following Commands Follows one step commands with increased time or repetition   Sit to Stand   Type of Assistance Needed Supervision   Comment close S with RW; increased visual/tactile cues for hand placement   Sit to Stand CARE Score 4   Bed-Chair Transfer   Type of Assistance Needed Incidental touching   Comment cg SPT with RW; increased visual/tactile cues for sequence/direction   Chair/Bed-to-Chair Transfer CARE Score 4   Walk 10 Feet   Type of Assistance Needed Incidental touching   Comment cg level and unlevel surfaces with RW; increased visual/tactile cues for sequence/direction; cues for wife to assist with cueing patient   Walk 10 Feet CARE Score 4   Walk 50 Feet with Two Turns   Type of Assistance Needed Incidental touching   Comment cg level and unlevel surfaces with RW; increased visual/tactile cues for sequence/direction; cues for wife to assist with cueing patient   Walk 50 Feet with Two Turns CARE Score 4   Walk 150 Feet   Type of Assistance Needed Incidental touching   Comment cg level and unlevel surfaces with RW; increased visual/tactile cues for sequence/direction; cues for wife to assist with cueing patient   Walk 150 Feet CARE Score 4   Walking 10 Feet on Uneven Surfaces   Type of Assistance Needed Incidental touching   Comment cg level and unlevel surfaces with RW; increased visual/tactile cues for sequence/direction; cues for wife to assist with cueing patient    Walking 10 Feet on Uneven Surfaces CARE Score 4   Ambulation   Primary Mode of Locomotion Prior to Admission Walk   Distance Walked (feet) 212 ft  (192' 65' (around PT gym) 214' 157')   Assist Device Roller Walker   Gait Pattern Inconsistant Maria Victoria;Slow Maria Victoria;Decreased foot clearance;Forward Flexion;Narrow ILIANA   Limitations Noted In Balance;Device Management;Endurance;Heel Strike;Posture;Safety;Speed;Strength   Provided Assistance with: Balance;Direction   Walk Assist Level Contact Guard   Findings cg level and unlevel surfaces with RW; increased visual/tactile cues for sequence/direction; cues for wife to assist with cueing patient   Does the patient walk? 2. Yes   Curb or Single Stair   Style negotiated Single stair   Type of Assistance Needed Incidental touching   Comment cg using 2 handrails; cues for sequence/technique; wife requires cues to help cue patient   1 Step (Curb) CARE Score 4   4 Steps   Type of Assistance Needed Incidental touching   Comment cg using 2 handrails; cues for sequence/technique; wife requires cues to help cue patient   4 Steps CARE Score 4   12 Steps   Type of Assistance Needed Incidental touching   Comment cg using 2 handrails; cues for sequence/technique; wife requires cues to help cue patient   12 Steps CARE Score 4   Stairs   Type Stairs;Ramp   # of Steps 14   Weight Bearing Precautions WBAT;Fall Risk   Assist Devices Bilateral Rail;Roller Walker   Findings cg using 2 handrails; cues for sequence/technique; wife requires cues to help cue patient; cg ramp with walker; cues for sequence/direction   Toilet Transfer   Type of Assistance Needed Incidental touching   Comment cg using walker/grab bar; cues for sequence/hand placement   Toilet Transfer CARE Score 4   Therapeutic Interventions   Balance gait and transfer training   Other ramp and stair negotiation   Equipment Use   [place] L1 x 10 min   Assessment   Treatment Assessment Patient agreeable to therapy session.  No pain  reported.  Attempted to void twice during session, however patient was unable to void.  No incontinence noted.  Pt's wife in for family training.  Reviewed gait, transfers, and stair negotiation with wife.   Wife feels comfortable with SUSANNE needed.  She requires cues on how to effectively cue patient for all mobility.  Will continue to complete family training with her daily. Patient appropriate for concurrent therapy to increase motivation and encouragement among pts with similar deficits while completing therapy session.   PT Barriers   Physical Impairment Decreased strength;Decreased range of motion;Decreased endurance;Impaired balance;Decreased mobility;Decreased cognition;Impaired judgement;Decreased safety awareness;Impaired hearing   Functional Limitation Wheelchair management;Walking;Transfers;Standing;Stair negotiation;Ramp negotiation;Car transfers   Plan   Treatment/Interventions Functional transfer training;LE strengthening/ROM;Elevations;Therapeutic exercise;Gait training   Progress Progressing toward goals   PT Therapy Minutes   PT Time In 1248   PT Time Out 1345   PT Total Time (minutes) 57   PT Mode of treatment - Individual (minutes) 20   PT Mode of treatment - Concurrent (minutes) 37   PT Mode of treatment - Group (minutes) 0   PT Mode of treatment - Co-treat (minutes) 0   PT Mode of Treatment - Total time(minutes) 57 minutes   PT Cumulative Minutes 2244   Therapy Time missed   Time missed? No

## 2024-03-25 NOTE — PROGRESS NOTES
Progress Note - Thomas Chase 83 y.o. male MRN: [id number]    Unit/Bed#: [facility] 213-02 Encounter: [id number]        Subjective:   Patient seen and examined at bedside after reviewing the chart and discussing the case with the caring staff.      Patient examined at bedside.  Patient has no acute symptoms.     Physical Exam   Vitals: Blood pressure 90/60, pulse 97, temperature 98.1 °F (36.7 °C), temperature source Temporal, resp. rate 16, height 6' (1.829 m), weight 60.4 kg (133 lb 2.5 oz), SpO2 95%.,Body mass index is 18.06 kg/m².  Constitutional: Patient in no acute distress.  HEENT: PERR, EOMI, MMM.  Cardiovascular: Normal rate and regular rhythm.    Pulmonary/Chest: Effort normal and breath sounds normal.   Abdomen: Soft, + BS, NT.    Assessment/Plan:  Thomas Chase is a(n) 83 y.o. year old male with left SDH and right SAH.     1.  Cardiac with hx of HTN, HLD/hypotension.  On pravastatin 40 mg daily (simvastatin nonformulary).  Noted to have low BP 3/10/24.  Patient's orthostatic vitals did not show significant drop.  Received IV fluids and started on midodrine.  Midodrine increased to 5 mg TID 3/21/24.  Continue to monitor.     2.  Allergic rhinitis.  Patient is on loratadine 10 mg daily.  3.  Depression/anxiety.  Patient is on Zoloft 25 mg daily.  4.  Insomnia.  Patient is on melatonin 6 mg at bedtime.  5.  Thrush.  Completed clotrimazole lozenges x 10 days on 3/21/24.   6.  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  Patient is mainly nonverbal.  7.  Urinary retention/enterococcal UTI.  Urinary retention protocol.  Flomax decreased to 0.4 mg daily 3/12/2024 due to possible cause of hypotension.  Finley catheter placed 2/22/24, removed on 2/28/24, placed again 3/1/24, removed again 3/7/24 and reinserted 3/8/24.    UA was ordered 3/12/24 to rule out UTI as patient was hypotensive with CRP and sed rate were slightly elevated.  Ori texted on call ID 3/15/24 regarding urine culture result who  recommended no treatment at that time as patient asymptomatic without urinary symptoms or flank pain.  Patient had temp 100.7F later that evening and was started on Levaquin 500 mg daily x 5 days.  8.  Dysphagia.  Speech therapy following.  Dysphagia 1 puureed with nectar thick liquid.  Video swallow study done on 3/11/2024.  9.  Vitamin B12 deficiency.  Patient started on vitamin B12 500 mcg daily.    10.  Pain and bowel management.  As per physiatrist.  11.  Intracranial hemorrhage.   Patient is receiving intensive PT OT ST as per physiatrist.    Anticipated discharge date:  TBD  PCP:  RONY Diaz

## 2024-03-25 NOTE — PLAN OF CARE
Problem: PAIN - ADULT  Goal: Verbalizes/displays adequate comfort level or baseline comfort level  Description: Interventions:  - Encourage patient to monitor pain and request assistance  - Assess pain using appropriate pain scale  - Administer analgesics based on type and severity of pain and evaluate response  - Implement non-pharmacological measures as appropriate and evaluate response  - Consider cultural and social influences on pain and pain management  - Notify physician/advanced practitioner if interventions unsuccessful or patient reports new pain  Outcome: Progressing     Problem: INFECTION - ADULT  Goal: Absence or prevention of progression during hospitalization  Description: INTERVENTIONS:  - Assess and monitor for signs and symptoms of infection  - Monitor lab/diagnostic results  - Monitor all insertion sites, i.e. indwelling lines, tubes, and drains  - Monitor endotracheal if appropriate and nasal secretions for changes in amount and color  - [place] appropriate cooling/warming therapies per order  - Administer medications as ordered  - Instruct and encourage patient and family to use good hand hygiene technique  - Identify and instruct in appropriate isolation precautions for identified infection/condition  Outcome: Progressing     Problem: SAFETY ADULT  Goal: Patient will remain free of falls  Description: INTERVENTIONS:  - Educate patient/family on patient safety including physical limitations  - Instruct patient to call for assistance with activity   - Consult OT/PT to assist with strengthening/mobility   - Keep Call bell within reach  - Keep bed low and locked with side rails adjusted as appropriate  - Keep care items and personal belongings within reach  - Initiate and maintain comfort rounds  - Make Fall Risk Sign visible to staff  - Offer Toileting every 2 Hours, in advance of need  - Initiate/Maintain bed/chair alarm  - Apply yellow socks and bracelet for high fall risk patients  -  Consider moving patient to room near nurses station  Outcome: Progressing     Problem: Prexisting or High Potential for Compromised Skin Integrity  Goal: Skin integrity is maintained or improved  Description: INTERVENTIONS:  - Identify patients at risk for skin breakdown  - Assess and monitor skin integrity  - Assess and monitor nutrition and hydration status  - Monitor labs   - Assess for incontinence   - Turn and reposition patient  - Assist with mobility/ambulation  - Relieve pressure over bony prominences  - Avoid friction and shearing  - Provide appropriate hygiene as needed including keeping skin clean and dry  - Evaluate need for skin moisturizer/barrier cream  - Collaborate with interdisciplinary team   - Patient/family teaching  - Consider wound care consult   Outcome: Progressing     Problem: Nutrition/Hydration-ADULT  Goal: Nutrient/Hydration intake appropriate for improving, restoring or maintaining nutritional needs  Description: Monitor and assess patient's nutrition/hydration status for malnutrition. Collaborate with interdisciplinary team and initiate plan and interventions as ordered.  Monitor patient's weight and dietary intake as ordered or per policy. Utilize nutrition screening tool and intervene as necessary. Determine patient's food preferences and provide high-protein, high-caloric foods as appropriate.     INTERVENTIONS:  - Monitor oral intake, urinary output, labs, and treatment plans  - Assess nutrition and hydration status and recommend course of action  - Evaluate amount of meals eaten  - Assist patient with eating if necessary   - Allow adequate time for meals  - Recommend/ encourage appropriate diets, oral nutritional supplements, and vitamin/mineral supplements  - Order, calculate, and assess calorie counts as needed  - Recommend, monitor, and adjust tube feedings and TPN/PPN based on assessed needs  - Assess need for intravenous fluids  - Provide specific nutrition/hydration  education as appropriate  - Include patient/family/caregiver in decisions related to nutrition  Outcome: Progressing

## 2024-03-25 NOTE — PROGRESS NOTES
PM&R PROGRESS NOTE:  Thomas Chase 83 y.o. male MRN: [id number]  Unit/Bed#: -02 Encounter: [id number]        **CHANGE IN REHAB DIAGNOSIS FROM PRE-ADMISSION SCREEN  Rehab Diagnosis: Impairment of mobility, safety, Activities of Daily Living (ADLs), and cognitive/communication skills due to Brain Dysfunction:  02.22  Traumatic, Closed Injury  Etiologic Diagnosis: L SDH, R SAH, L anterior hypodensity lateral temporal lobe    HPI: Thomas Chase is a 83 y.o. male with past medical history significant for previous fall with resultant traumatic brain injury-subdural hematoma in 2022 status post left craniotomy, chronic aphasia, hard of hearing with cochlear implant in place, hyperlipidemia who presented to the [facility] on 2/13/2024 with increased weakness and mental status change for several days.  Unclear if patient had head trauma.  CT head showing a 3 mm left frontal subdural hemorrhage.  CT lumbar spine showing DJD.  Patient found to have dysphagia and was seen by speech therapy.  VFSS on 2/15/2024 cleared him for a puréed diet with thin liquids.  Patient was also started by psychiatry for depression and started on Zoloft 25 mg daily.  Patient sustained a rapid response and a unwitnessed fall with head trauma notable bump on head on 2/15/2024.  CT head showing a new small volume acute subarachnoid hemorrhage in the left frontal opercular region as well as new nondisplaced fracture of the zygomatic arch with soft tissue contusion, stable 3 mm subdural hemorrhage seen previously.  Patient transferred to [facility] for further evaluation.  Patient seen by neurology and neurosurgery.  CTA head and neck showing a left anterior hypodensity in the left anterior lateral temporal lobe with 2 punctate hyperdensities.  These represented possible nonhemorrhagic contusions or venous infarcts.  Less likely to represent ischemia or neoplasm.  Follow-up imaging recommended with CT head in 2-3 weeks.  CT  venogram showing patent dural venous sinuses.  Patient was cleared for DVT prophylaxis and started on Keppra for seizure prophylaxis.    Medical course complicated by suspected UTI based on UA and symptoms.  Patient was treated with antibiotics x 3 days.  After medical stabilization, patient found to have acute functional deficits from his baseline in mobility, self-care, speech swallow cognition therefore admitted to [facility] for acute inpatient rehabilitation.    SUBJECTIVE: Patient seen face-to-face this morning.  Feeling tired but hopeful that he can get home this week with his family.  Voiding trial going well today with better continent voids and low PVRs.  Patient very happy about this    Patient denies pain.  Denies feeling sad or depressed      ASSESSMENT: Stable, progressing      PLAN:    Rehabilitation  Functional deficits: Aphasia, dysphagia, impaired cognition, impaired balance, impulsivity, poor safety awareness impaired mobility, self care    Continue current rehabilitation plan of care to maximize function.  Expected Discharge: Possible home discharge with hired assistance  Family training in progress      DVT prophylaxis  Continue Lovenox      * Intracranial hemorrhage (HCC)-resolved as of 3/21/2024  Assessment & Plan  Initial presentation on 2/13/2024 with increased weakness in the legs, mental status changes for several days  CT head showing a 3 mm left frontal subdural hemorrhage  Rapid response 2/15/2024 with fall and head trauma  CT head showing a new small volume acute subarachnoid hemorrhage in the left frontal opercular region and new nondisplaced fracture of the zygomatic arch with soft tissue contusion.    Transfer[facility] to [facility] [place]  CTA head and neck and and repeat CT head showing a left anterior hypodensity in the left anterior lateral temporal lobe with 2 punctate hyperdensities.  Differential diagnosis nonhemorrhagic contusions or venous infarcts.  Patient seen by neurology and  neurosurgery  Conservative management  Cleared for DVT prophylaxis  Started on Keppra for seizure prophylaxis  Repeat CT head scheduled on Monday, 3/4/2024 -personally reviewed  Radiology read as follows:  Improving left anterior temporal lobe contusions with resolving edema. Stable subacute subdural hemorrhage along the anterior left frontal convexity. Resolved subarachnoid and intraventricular hemorrhages. Unchanged minimal hyperdensity beneath the left craniotomy flap. Stable right parafalcine subdural hygroma. There is no new intra or extra-axial hemorrhage.  Follow-up with neurosurgery on 3/6/2024 virtually completed.  Recommendations as follows: Neurosurgery will sign off, patient will follow-up in 2 weeks with repeat CT head   CTH 3/16/24: No acute intracranial abnormality.Stable thin chronic left frontal subdural collection stable since 11/10/2023. Evaluation of the left temporal lobe is limited due to artifact from the cochlear implant but there may be mild developing encephalomalacia related to prior contusion.  Follow-up with neurosurgery and neurology in outpatient clinic (appointment is 3/21/24 with neurology in 3/22/2024 with neurosurgery)  Follow-up with Neurology in 3 months  Follow-up with Neurosurgery PRN    Behavior safety risk  Assessment & Plan  Patient demonstrating mild impulsivity in acute care  Fall x 2 in acute care  Falls at home in the past    Safety behavior plan going well and ARC.  No further impulsivity  Nursing and staff to provide close supervision near nursing station  Patient placed on 4 side rails (not as a restraint, patient and wife preference)  Monitor sleep-wake cycle  Avoid overstimulation: 1 visitor at a time, low lights, low sounds      Insomnia  Assessment & Plan  Continue melatonin to 9 mg QHS    Hematuria  Assessment & Plan  Started 3/2 - 3/3/2024  Minor, and more likely related to irritation from Finley catheter  Irrigation as needed per urology  Asymptomatic  H&H  stable    Headache  Assessment & Plan  Resolved  Continue Tylenol 650 mg PRN     Severe protein-calorie malnutrition (HCC)  Assessment & Plan  BMI 18.06  Nutrition following  Optimize oral intake  Supplements ordered  He is eating % of meals  Hydration is variable  Maintain IV  IVF PRN  SLP working towards free water protocol    Dysphagia  Assessment & Plan  VFSS completed 2/15/2024  Cleared for a puréed diet with nectar thick liquids  SLP to follow while at [facility]  Repeat VFSS 3/11/24: Mild oral dysphagia, Moderate oral-pharyngeal dysphagia  Recommendations:  Diet: Dysphagia 1 pureed   Liquids: Nectar   Patient doing very well with oral food eating % of all of his meals.  Also gaining weight.  At times has difficulty with fluid intake due to nectar thick liquids and dysphagia.  Repeat swallow study recommended in 3-4 weeks  Supplement with IV fluids if needed  SLP also working towards a free water nectar thick protocol with oral care prior  Possible future consideration for PEG       Low BP  Assessment & Plan  Resolved  Continue midodrine 2.5 mg TID (3/18/24) > Increased to 5 mg TID (3/21/24)    BP lower since 3/10/2024  Compression stockings and abdominal binder ordered  Internal medicine ordering IV fluid bolus.  I continued IVF x 1 L which was completed  BP continues to be very low especially in standing.   Labs reviewed - nothing to explain low BP        Urinary retention  Assessment & Plan  Finley catheter removed on [facility]  Trial of void underway  Mixed pattern  Unfortunately required multiple straight catheterizations  Finley catheter replaced 3/1/24 per urology  Flomax reduced to 0.4 mg daily due to hypotension   Trial of void 3/7/2024 - failed this  Finley re-inserted 3/8/24  Patient likely to require longer time prior to removal of Finley catheter -recommend removal in 2 weeks around 3/22/2024  Finley removed and Trial of void started 3/22/2024  Slow in the beginning however after good BM patient was  starting to void more with lower PVRs.  Having both continent and incontinent episodes -mixed pattern  Timed toilet frequently      Constipation due to neurogenic bowel  Assessment & Plan  Patient started on more aggressive bowel program yesterday  BM 3/24/24    Hearing loss  Assessment & Plan  Chronic hearing loss  Patient status post cochlear implant  MRI contraindicated    Hypercholesterolemia  Assessment & Plan  Continue simvastatin 20 mg daily (home dose)    UTI (urinary tract infection)-resolved as of 2/21/2024  Assessment & Plan  Suspected UTI based on UA and symptoms in acute care  Completed 3 days IV antibiotics  In ARC:  UA was ordered by IM : equivocal - neg.   Culture was done by internal medicine.  Enterococcus faecalis  Likely colonized.  Asymptomatic.  Over the weekend, however, Dr. Paz did decide to initiate treatment with Levaquin x 5 days.          Appreciate IM consultants medical co-management.  Labs, medications, and imaging personally reviewed.      ROS:  A ten point review of systems was completed on 03/25/24 and pertinent positives are listed in subjective section. All other systems reviewed were negative.       OBJECTIVE:   BP 90/60 (BP Location: Right arm)   Pulse 97   Temp 98.1 °F (36.7 °C) (Temporal)   Resp 16   Ht 6' (1.829 m)   Wt 60.4 kg (133 lb 2.5 oz)   SpO2 95%   BMI 18.06 kg/m²     Physical Exam  Vitals and nursing note reviewed.   Constitutional:       General: He is not in acute distress.  HENT:      Head: Normocephalic and atraumatic.      Ears:      Comments: Impaired hearing     Nose: Nose normal.      Mouth/Throat:      Mouth: Mucous membranes are moist.   Eyes:      Conjunctiva/sclera: Conjunctivae normal.   Cardiovascular:      Rate and Rhythm: Normal rate and regular rhythm.      Pulses: Normal pulses.   Pulmonary:      Effort: Pulmonary effort is normal.      Breath sounds: Normal breath sounds. No wheezing or rales.   Abdominal:      General: Bowel sounds are  normal. There is no distension.      Palpations: Abdomen is soft.      Tenderness: There is no abdominal tenderness.   Musculoskeletal:         General: No swelling.      Cervical back: Neck supple.   Skin:     General: Skin is warm.   Neurological:      Mental Status: He is alert and oriented to person, place, and time.      Comments: Aphasia   Doing transfers at a supervision level  Ambulating supervision level -must use a walker   Psychiatric:         Mood and Affect: Mood normal.          Lab Results   Component Value Date    WBC 5.63 03/25/2024    HGB 11.7 (L) 03/25/2024    HCT 37.0 03/25/2024    MCV 98 03/25/2024     (H) 03/25/2024     Lab Results   Component Value Date    SODIUM 139 03/25/2024    K 4.2 03/25/2024     03/25/2024    CO2 27 03/25/2024    BUN 16 03/25/2024    CREATININE 0.58 (L) 03/25/2024    GLUC 82 03/25/2024    CALCIUM 8.8 03/25/2024     Lab Results   Component Value Date    INR 1.02 06/02/2022    INR 1.10 05/29/2022    INR 1.04 05/28/2022    PROTIME 13.0 06/02/2022    PROTIME 13.7 05/29/2022    PROTIME 13.2 05/28/2022           Current Facility-Administered Medications:     acetaminophen (TYLENOL) tablet 650 mg, 650 mg, Oral, Q6H PRN, Daniel Clayton MD, 650 mg at 03/24/24 2054    bisacodyl (DULCOLAX) rectal suppository 10 mg, 10 mg, Rectal, Daily PRN, Darwin Paz MD, 10 mg at 03/22/24 1327    cyanocobalamin (VITAMIN B-12) tablet 500 mcg, 500 mcg, Oral, Daily, Darwin Paz MD, 500 mcg at 03/25/24 0931    enoxaparin (LOVENOX) subcutaneous injection 40 mg, 40 mg, Subcutaneous, Q24H JEANNIE, Alexandrea Lugo MD, 40 mg at 03/25/24 0940    lactulose (CHRONULAC) oral solution 20 g, 20 g, Oral, BID PRN, Darwin Paz MD, 20 g at 03/22/24 1155    loratadine (CLARITIN) tablet 10 mg, 10 mg, Oral, Daily, Amada LEE Dagnall, PA-C, 10 mg at 03/25/24 0931    meclizine (ANTIVERT) tablet 25 mg, 25 mg, Oral, Q8H PRN, Amada LEE Dagnall, PA-C    melatonin tablet 9 mg, 9 mg, Oral, HS, [facility]  MD Brittney, 9 mg at 03/24/24 2100    midodrine (PROAMATINE) tablet 5 mg, 5 mg, Oral, TID AC, Amada L Dagnall, PA-C, 5 mg at 03/25/24 1205    multivitamin-minerals (CENTRUM) tablet 1 tablet, 1 tablet, Oral, Daily, Amada L Dagnall, PA-C, 1 tablet at 03/25/24 0931    polyethylene glycol (MIRALAX) packet 17 g, 17 g, Oral, Daily PRN, Ashley Depadua, MD, 17 g at 03/21/24 1655    pravastatin (PRAVACHOL) tablet 40 mg, 40 mg, Oral, Daily With Dinner, Amada Coughlinnall, PA-C, 40 mg at 03/24/24 1608    senna (SENOKOT) tablet 8.6 mg, 1 tablet, Oral, HS, Alexandrea Russ Lugo MD, 8.6 mg at 03/24/24 2100    sertraline (ZOLOFT) tablet 25 mg, 25 mg, Oral, Daily, Amada L Dagnall, PA-C, 25 mg at 03/25/24 0931    tamsulosin (FLOMAX) capsule 0.4 mg, 0.4 mg, Oral, Daily With Dinner, Darwin Paz MD, 0.4 mg at 03/24/24 1608    Past Medical History:   Diagnosis Date    Arthritis     Encounter for general adult medical examination without abnormal findings 03/20/2019    Fall 11/03/2022    Hyperlipidemia     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    Macular degeneration, wet (HCC)     Urinary retention 06/02/2022       Patient Active Problem List    Diagnosis Date Noted    Behavior safety risk 02/20/2024    Insomnia 03/21/2024    Abnormal CT of the head 03/21/2024    Hematuria 03/04/2024    Headache 02/20/2024    Bilateral lower extremity weakness 02/17/2024    Abnormal CT scan, lumbar spine 02/15/2024    Severe protein-calorie malnutrition (HCC) 02/14/2024    Debility 02/13/2024    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    Dysphagia 11/21/2023    Macular degeneration, age related 02/27/2023    H/O traumatic subdural hematoma 02/27/2023    Sensorineural hearing loss (SNHL), bilateral 08/18/2022    Balance disorder 06/28/2022    Abnormal echocardiogram 06/27/2022    Right bundle-branch block 06/27/2022    Low BP 06/19/2022    Diastolic dysfunction 06/19/2022    EKG abnormalities 06/19/2022    Constipation due to neurogenic bowel 06/02/2022     Urinary retention 06/02/2022    SDH (subdural hematoma) (HCC) 05/27/2022    Primary osteoarthritis of left knee 05/17/2022    Hygroma 04/26/2022    Right hand pain     Carpal tunnel syndrome on right     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    Overweight (BMI 25.0-29.9) 01/27/2021    Negative depression screening 09/26/2019    Hypercholesterolemia 07/12/2018    Borderline hypertension 07/12/2018    Hearing loss 04/08/2009          Alexandrea Lugo MD    I have spent a total time of 60 minutes on 03/25/24 in caring for this patient including Patient and family education, Impressions, Documenting in the medical record, Reviewing / ordering tests, medicine, procedures  , Obtaining or reviewing history  , and Communicating with other healthcare professionals .      ** Please Note:  voice to text software may have been used in the creation of this document. Although proof errors in transcription or interpretation are a potential of such software**

## 2024-03-25 NOTE — PROGRESS NOTES
03/25/24 0945   Pain Assessment   Pain Assessment Tool 0-10   Pain Score No Pain   Pain Rating: FLACC (Rest) - Face 0   Pain Rating: FLACC (Rest) - Legs 0   Pain Rating: FLACC (Rest) - Activity 0   Pain Rating: FLACC (Rest) - Cry 0   Pain Rating: FLACC (Rest) - Consolability 0   Score: FLACC (Rest) 0   Restrictions/Precautions   Precautions Aphasia;Aspiration;Bed/chair alarms;Cognitive;Fall Risk;Hard of hearing;Supervision on toilet/commode;Impulsive   Comprehension   Assist Devices Hearing Aid   Comprehension (FIM) 4 - Understands basic info/conversation 75-90% of time   Expression   Expression (FIM) 3 - Expresses basic info/needs 50-74% of time   Social Interaction   Social Interaction (FIM) 5 - Interacts appropriately with others 90% of time   Problem Solving   Problem solving (FIM) 3 - Solves basic problmes 50-74% of time   Memory   Memory (FIM) 3 - Recognizes, recalls/performs 50-74%   Speech/Language/Cognition Assessmetn   Treatment Assessment low tech AAC board F9 pictures across 10 trials 5 trials with max cue on first trial repeated same word each time across next 5 trials and amount of cueing decreased to min-mod cue. Completed with words ( toilet, eat drink , pillow, bed and socks). Best performance with socks.   Eating   Type of Assistance Needed Supervision   Physical Assistance Level No physical assistance   Eating CARE Score 4   Swallow Assessment   Swallow Treatment Assessment effortful swallow completed correctly ~60% of the time with consistent mod cue cueing. 10 trials 5CC cup with water  cough x5 trials ( 10 minutes to complete with encouragement to keep going). Anterior loss most trials due to reduced lip seal with spout openeing. 10 trials of water via straw single sips (Pinched straw to control sips) with cough x4 trials ( 15 minutes to complete due to increased fatigue by end of session- encouragement to keep going).    SLP Therapy Minutes   SLP Time In 0945   SLP Time Out 1030   SLP Total  Time (minutes) 45   SLP Mode of treatment - Individual (minutes) 45   SLP Mode of treatment - Concurrent (minutes) 0   SLP Mode of treatment - Group (minutes) 0   SLP Mode of treatment - Co-treat (minutes) 0   SLP Mode of Treatment - Total time(minutes) 45 minutes   SLP Cumulative Minutes 965

## 2024-03-26 PROCEDURE — 99232 SBSQ HOSP IP/OBS MODERATE 35: CPT | Performed by: PHYSICAL MEDICINE & REHABILITATION

## 2024-03-26 PROCEDURE — 92526 ORAL FUNCTION THERAPY: CPT | Performed by: NURSE PRACTITIONER

## 2024-03-26 PROCEDURE — 97535 SELF CARE MNGMENT TRAINING: CPT

## 2024-03-26 PROCEDURE — 97530 THERAPEUTIC ACTIVITIES: CPT

## 2024-03-26 PROCEDURE — 92507 TX SP LANG VOICE COMM INDIV: CPT | Performed by: NURSE PRACTITIONER

## 2024-03-26 PROCEDURE — 97537 COMMUNITY/WORK REINTEGRATION: CPT

## 2024-03-26 PROCEDURE — 97116 GAIT TRAINING THERAPY: CPT

## 2024-03-26 PROCEDURE — 97110 THERAPEUTIC EXERCISES: CPT

## 2024-03-26 RX ADMIN — MIDODRINE HYDROCHLORIDE 5 MG: 5 TABLET ORAL at 16:12

## 2024-03-26 RX ADMIN — MIDODRINE HYDROCHLORIDE 5 MG: 5 TABLET ORAL at 09:31

## 2024-03-26 RX ADMIN — Medication 9 MG: at 21:06

## 2024-03-26 RX ADMIN — SENNOSIDES 8.6 MG: 8.6 TABLET, FILM COATED ORAL at 21:06

## 2024-03-26 RX ADMIN — Medication 1 TABLET: at 09:21

## 2024-03-26 RX ADMIN — CYANOCOBALAMIN TAB 500 MCG 500 MCG: 500 TAB at 09:21

## 2024-03-26 RX ADMIN — ENOXAPARIN SODIUM 40 MG: 40 INJECTION SUBCUTANEOUS at 09:21

## 2024-03-26 RX ADMIN — SERTRALINE HYDROCHLORIDE 25 MG: 25 TABLET ORAL at 09:21

## 2024-03-26 RX ADMIN — PRAVASTATIN SODIUM 40 MG: 40 TABLET ORAL at 16:12

## 2024-03-26 RX ADMIN — LORATADINE 10 MG: 10 TABLET ORAL at 09:21

## 2024-03-26 RX ADMIN — TAMSULOSIN HYDROCHLORIDE 0.4 MG: 0.4 CAPSULE ORAL at 16:13

## 2024-03-26 RX ADMIN — MIDODRINE HYDROCHLORIDE 5 MG: 5 TABLET ORAL at 11:57

## 2024-03-26 NOTE — NURSING NOTE
At morning PVR check at 0615, patients brief was very saturated. PVR completed and showed 323ml. Patient noted to be more tired this morning. Set of vitals taken, all WNL.

## 2024-03-26 NOTE — PROGRESS NOTES
PM&R PROGRESS NOTE:  Thomas Chase 83 y.o. male MRN: [id number]  Unit/Bed#: -02 Encounter: [id number]        **CHANGE IN REHAB DIAGNOSIS FROM PRE-ADMISSION SCREEN  Rehab Diagnosis: Impairment of mobility, safety, Activities of Daily Living (ADLs), and cognitive/communication skills due to Brain Dysfunction:  02.22  Traumatic, Closed Injury  Etiologic Diagnosis: L SDH, R SAH, L anterior hypodensity lateral temporal lobe    HPI: Thomas Chase is a 83 y.o. male with past medical history significant for previous fall with resultant traumatic brain injury-subdural hematoma in 2022 status post left craniotomy, chronic aphasia, hard of hearing with cochlear implant in place, hyperlipidemia who presented to the [facility] on 2/13/2024 with increased weakness and mental status change for several days.  Unclear if patient had head trauma.  CT head showing a 3 mm left frontal subdural hemorrhage.  CT lumbar spine showing DJD.  Patient found to have dysphagia and was seen by speech therapy.  VFSS on 2/15/2024 cleared him for a puréed diet with thin liquids.  Patient was also started by psychiatry for depression and started on Zoloft 25 mg daily.  Patient sustained a rapid response and a unwitnessed fall with head trauma notable bump on head on 2/15/2024.  CT head showing a new small volume acute subarachnoid hemorrhage in the left frontal opercular region as well as new nondisplaced fracture of the zygomatic arch with soft tissue contusion, stable 3 mm subdural hemorrhage seen previously.  Patient transferred to [facility] for further evaluation.  Patient seen by neurology and neurosurgery.  CTA head and neck showing a left anterior hypodensity in the left anterior lateral temporal lobe with 2 punctate hyperdensities.  These represented possible nonhemorrhagic contusions or venous infarcts.  Less likely to represent ischemia or neoplasm.  Follow-up imaging recommended with CT head in 2-3 weeks.  CT  venogram showing patent dural venous sinuses.  Patient was cleared for DVT prophylaxis and started on Keppra for seizure prophylaxis.    Medical course complicated by suspected UTI based on UA and symptoms.  Patient was treated with antibiotics x 3 days.  After medical stabilization, patient found to have acute functional deficits from his baseline in mobility, self-care, speech swallow cognition therefore admitted to [facility] for acute inpatient rehabilitation.    SUBJECTIVE: Patient seen ambulating with OT this morning. No acute issues.  Family training in progress.        ASSESSMENT: Stable, progressing      PLAN:    Rehabilitation  Functional deficits: Aphasia, dysphagia, impaired cognition, impaired balance, impulsivity, poor safety awareness impaired mobility, self care    Continue current rehabilitation plan of care to maximize function.  Expected Discharge: Possible home discharge with hired assistance  Family training in progress      DVT prophylaxis  Continue Lovenox      * Intracranial hemorrhage (HCC)-resolved as of 3/21/2024  Assessment & Plan  Initial presentation on 2/13/2024 with increased weakness in the legs, mental status changes for several days  CT head showing a 3 mm left frontal subdural hemorrhage  Rapid response 2/15/2024 with fall and head trauma  CT head showing a new small volume acute subarachnoid hemorrhage in the left frontal opercular region and new nondisplaced fracture of the zygomatic arch with soft tissue contusion.    Transferred to [facility]  CTA head and neck and and repeat CT head showing a left anterior hypodensity in the left anterior lateral temporal lobe with 2 punctate hyperdensities.  Differential diagnosis nonhemorrhagic contusions or venous infarcts.  Patient seen by neurology and neurosurgery  Conservative management  Cleared for DVT prophylaxis  Started on Keppra for seizure prophylaxis  Repeat CT head scheduled on Monday, 3/4/2024 -personally reviewed  Radiology  read as follows:  Improving left anterior temporal lobe contusions with resolving edema. Stable subacute subdural hemorrhage along the anterior left frontal convexity. Resolved subarachnoid and intraventricular hemorrhages. Unchanged minimal hyperdensity beneath the left craniotomy flap. Stable right parafalcine subdural hygroma. There is no new intra or extra-axial hemorrhage.  Follow-up with neurosurgery on 3/6/2024 virtually completed.  Recommendations as follows: Neurosurgery will sign off, patient will follow-up in 2 weeks with repeat CT head   CTH 3/16/24: No acute intracranial abnormality.Stable thin chronic left frontal subdural collection stable since 11/10/2023. Evaluation of the left temporal lobe is limited due to artifact from the cochlear implant but there may be mild developing encephalomalacia related to prior contusion.  Follow-up with neurosurgery and neurology in outpatient clinic (appointment is 3/21/24 with neurology in 3/22/2024 with neurosurgery)  Follow-up with Neurology in 3 months  Follow-up with Neurosurgery PRN    Behavior safety risk  Assessment & Plan  Patient demonstrating mild impulsivity in acute care  Fall x 2 in acute care  Falls at home in the past    Safety behavior plan going well and ARC.  No further impulsivity  Nursing and staff to provide close supervision near nursing station  Patient placed on 4 side rails (not as a restraint, patient and wife preference)  Monitor sleep-wake cycle  Avoid overstimulation: 1 visitor at a time, low lights, low sounds      Insomnia  Assessment & Plan  Continue melatonin to 9 mg QHS    Hematuria  Assessment & Plan  Started 3/2 - 3/3/2024  Minor, and more likely related to irritation from Finley catheter  Irrigation as needed per urology  Asymptomatic  H&H stable    Headache  Assessment & Plan  Resolved  Continue Tylenol 650 mg PRN     Severe protein-calorie malnutrition (HCC)  Assessment & Plan  BMI 18.06  Nutrition following  Optimize oral  intake  Supplements ordered  He is eating % of meals  Hydration is variable  Maintain IV  IVF PRN  SLP working towards free water protocol    Dysphagia  Assessment & Plan  VFSS completed 2/15/2024  Cleared for a puréed diet with nectar thick liquids  SLP to follow while at [facility]  Repeat VFSS 3/11/24: Mild oral dysphagia, Moderate oral-pharyngeal dysphagia  Recommendations:  Diet: Dysphagia 1 pureed   Liquids: Nectar   Patient doing very well with oral food eating % of all of his meals.  Also gaining weight.  At times has difficulty with fluid intake due to nectar thick liquids and dysphagia.  Repeat swallow study recommended in 3-4 weeks  Supplement with IV fluids if needed  SLP also working towards a free water nectar thick protocol with oral care prior  Possible future consideration for PEG       Low BP  Assessment & Plan  Resolved  Continue midodrine 2.5 mg TID (3/18/24) > Increased to 5 mg TID (3/21/24)    BP lower since 3/10/2024  Compression stockings and abdominal binder ordered  Internal medicine ordering IV fluid bolus.  I continued IVF x 1 L which was completed  BP continues to be very low especially in standing.   Labs reviewed - nothing to explain low BP        Urinary retention  Assessment & Plan  Finley catheter removed on [facility]  Trial of void underway  Mixed pattern  Unfortunately required multiple straight catheterizations  Finley catheter replaced 3/1/24 per urology  Flomax reduced to 0.4 mg daily due to hypotension   Trial of void 3/7/2024 - failed this  Finley re-inserted 3/8/24  Patient likely to require longer time prior to removal of Finley catheter -recommend removal in 2 weeks around 3/22/2024  Finley removed and Trial of void started 3/22/2024  Slow in the beginning however after good BM patient was starting to void more with lower PVRs.  Having both continent and incontinent episodes -mixed pattern  Timed toilet frequently      Constipation due to neurogenic bowel  Assessment &  Plan  Patient started on more aggressive bowel program yesterday  BM 3/24/24    Hearing loss  Assessment & Plan  Chronic hearing loss  Patient status post cochlear implant  MRI contraindicated    Hypercholesterolemia  Assessment & Plan  Continue simvastatin 20 mg daily (home dose)    UTI (urinary tract infection)-resolved as of 2/21/2024  Assessment & Plan  Suspected UTI based on UA and symptoms in acute care  Completed 3 days IV antibiotics  In ARC:  UA was ordered by IM : equivocal - neg.   Culture was done by internal medicine.  Enterococcus faecalis  Likely colonized.  Asymptomatic.  Over the weekend, however, Dr. Paz did decide to initiate treatment with Levaquin x 5 days.          Appreciate IM consultants medical co-management.  Labs, medications, and imaging personally reviewed.      ROS:  A ten point review of systems was completed on 03/26/24 and pertinent positives are listed in subjective section. All other systems reviewed were negative.       OBJECTIVE:   /64 (BP Location: Left arm)   Pulse 69   Temp (!) 97.3 °F (36.3 °C) (Temporal)   Resp 17   Ht 6' (1.829 m)   Wt 57.8 kg (127 lb 6.8 oz)   SpO2 94%   BMI 17.28 kg/m²     Physical Exam  Vitals and nursing note reviewed.   Constitutional:       General: He is not in acute distress.     Appearance: He is well-developed.   HENT:      Head: Normocephalic.      Nose: Nose normal.   Eyes:      Conjunctiva/sclera: Conjunctivae normal.   Cardiovascular:      Pulses: Normal pulses.   Pulmonary:      Effort: Pulmonary effort is normal.      Breath sounds: No wheezing.   Abdominal:      General: There is no distension.      Palpations: Abdomen is soft.   Musculoskeletal:         General: No swelling.      Cervical back: Neck supple.   Skin:     General: Skin is warm.   Neurological:      Mental Status: He is alert and oriented to person, place, and time.   Psychiatric:         Mood and Affect: Mood normal.          Lab Results   Component Value Date     WBC 5.63 03/25/2024    HGB 11.7 (L) 03/25/2024    HCT 37.0 03/25/2024    MCV 98 03/25/2024     (H) 03/25/2024     Lab Results   Component Value Date    SODIUM 139 03/25/2024    K 4.2 03/25/2024     03/25/2024    CO2 27 03/25/2024    BUN 16 03/25/2024    CREATININE 0.58 (L) 03/25/2024    GLUC 82 03/25/2024    CALCIUM 8.8 03/25/2024     Lab Results   Component Value Date    INR 1.02 06/02/2022    INR 1.10 05/29/2022    INR 1.04 05/28/2022    PROTIME 13.0 06/02/2022    PROTIME 13.7 05/29/2022    PROTIME 13.2 05/28/2022           Current Facility-Administered Medications:     acetaminophen (TYLENOL) tablet 650 mg, 650 mg, Oral, Q6H PRN, Daniel Clayton MD, 650 mg at 03/24/24 2054    bisacodyl (DULCOLAX) rectal suppository 10 mg, 10 mg, Rectal, Daily PRN, Darwin Paz MD, 10 mg at 03/22/24 1327    cyanocobalamin (VITAMIN B-12) tablet 500 mcg, 500 mcg, Oral, Daily, Darwin Paz MD, 500 mcg at 03/26/24 0921    enoxaparin (LOVENOX) subcutaneous injection 40 mg, 40 mg, Subcutaneous, Q24H JEANNIE, Alexandrea Lugo MD, 40 mg at 03/26/24 0921    lactulose (CHRONULAC) oral solution 20 g, 20 g, Oral, BID PRN, Darwin Paz MD, 20 g at 03/22/24 1155    loratadine (CLARITIN) tablet 10 mg, 10 mg, Oral, Daily, Amada LEE Dagnall, PA-C, 10 mg at 03/26/24 0921    meclizine (ANTIVERT) tablet 25 mg, 25 mg, Oral, Q8H PRN, Amada L Dagnall, PA-C    melatonin tablet 9 mg, 9 mg, Oral, HS, Alexandrea Lugo MD, 9 mg at 03/25/24 2105    midodrine (PROAMATINE) tablet 5 mg, 5 mg, Oral, TID AC, Amada L Dagnall, PA-C, 5 mg at 03/26/24 1157    multivitamin-minerals (CENTRUM) tablet 1 tablet, 1 tablet, Oral, Daily, Amada L Dagnall, PA-C, 1 tablet at 03/26/24 0921    polyethylene glycol (MIRALAX) packet 17 g, 17 g, Oral, Daily PRN, Ashley Depadua, MD, 17 g at 03/21/24 1655    pravastatin (PRAVACHOL) tablet 40 mg, 40 mg, Oral, Daily With Dinner, Amada L Dagnall, PA-C, 40 mg at 03/25/24 1638    senna (SENOKOT) tablet 8.6 mg, 1  tablet, Oral, HS, Alexandrea Lugo MD, 8.6 mg at 03/25/24 2105    sertraline (ZOLOFT) tablet 25 mg, 25 mg, Oral, Daily, Amada Epperson PA-C, 25 mg at 03/26/24 0921    tamsulosin (FLOMAX) capsule 0.4 mg, 0.4 mg, Oral, Daily With Dinner, Darwin Paz MD, 0.4 mg at 03/25/24 1638    Past Medical History:   Diagnosis Date    Arthritis     Encounter for general adult medical examination without abnormal findings 03/20/2019    Fall 11/03/2022    Hyperlipidemia     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    Macular degeneration, wet (HCC)     Urinary retention 06/02/2022       Patient Active Problem List    Diagnosis Date Noted    Behavior safety risk 02/20/2024    Insomnia 03/21/2024    Abnormal CT of the head 03/21/2024    Hematuria 03/04/2024    Headache 02/20/2024    Bilateral lower extremity weakness 02/17/2024    Abnormal CT scan, lumbar spine 02/15/2024    Severe protein-calorie malnutrition (HCC) 02/14/2024    Debility 02/13/2024    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    Dysphagia 11/21/2023    Macular degeneration, age related 02/27/2023    H/O traumatic subdural hematoma 02/27/2023    Sensorineural hearing loss (SNHL), bilateral 08/18/2022    Balance disorder 06/28/2022    Abnormal echocardiogram 06/27/2022    Right bundle-branch block 06/27/2022    Low BP 06/19/2022    Diastolic dysfunction 06/19/2022    EKG abnormalities 06/19/2022    Constipation due to neurogenic bowel 06/02/2022    Urinary retention 06/02/2022    SDH (subdural hematoma) (HCC) 05/27/2022    Primary osteoarthritis of left knee 05/17/2022    Hygroma 04/26/2022    Right hand pain     Carpal tunnel syndrome on right     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    Overweight (BMI 25.0-29.9) 01/27/2021    Negative depression screening 09/26/2019    Hypercholesterolemia 07/12/2018    Borderline hypertension 07/12/2018    Hearing loss 04/08/2009          Alexandrea Lugo MD    I have spent a total time of 33 minutes on 03/26/24 in caring  for this patient including Impressions and Documenting in the medical record.      ** Please Note:  voice to text software may have been used in the creation of this document. Although proof errors in transcription or interpretation are a potential of such software**

## 2024-03-26 NOTE — PROGRESS NOTES
Progress Note - Thomas Chase 83 y.o. male MRN: [id number]    Unit/Bed#: [facility] 213-02 Encounter: [id number]        Subjective:   Patient seen and examined at bedside after reviewing the chart and discussing the case with the caring staff.      Patient examined at bedside.  Patient has no acute symptoms.  Patient is able to urinate most of the time.     Physical Exam   Vitals: Blood pressure 114/64, pulse 69, temperature (!) 97.3 °F (36.3 °C), temperature source Temporal, resp. rate 17, height 6' (1.829 m), weight 57.8 kg (127 lb 6.8 oz), SpO2 94%.,Body mass index is 17.28 kg/m².  Constitutional: Patient in no acute distress.  HEENT: PERR, EOMI, MMM.  Cardiovascular: Normal rate and regular rhythm.    Pulmonary/Chest: Effort normal and breath sounds normal.   Abdomen: Soft, + BS, NT.    Assessment/Plan:  Thomas Chase is a(n) 83 y.o. year old male with left SDH and right SAH.     1.  Cardiac with hx of HTN, HLD/hypotension.  On pravastatin 40 mg daily (simvastatin nonformulary).  Noted to have low BP 3/10/24.  Patient's orthostatic vitals did not show significant drop.  Received IV fluids and started on midodrine.  Midodrine increased to 5 mg TID 3/21/24.  Continue to monitor.     2.  Allergic rhinitis.  Patient is on loratadine 10 mg daily.  3.  Depression/anxiety.  Patient is on Zoloft 25 mg daily.  4.  Insomnia.  Patient is on melatonin 6 mg at bedtime.  5.  Thrush.  Completed clotrimazole lozenges x 10 days on 3/21/24.   6.  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  Patient is mainly nonverbal.  7.  Urinary retention/enterococcal UTI.  Urinary retention protocol.  Flomax decreased to 0.4 mg daily 3/12/2024 due to possible cause of hypotension.  Finley catheter placed 2/22/24, removed on 2/28/24, placed again 3/1/24, removed again 3/7/24 and reinserted 3/8/24.    UA was ordered 3/12/24 to rule out UTI as patient was hypotensive with CRP and sed rate were slightly elevated.  Tiger texted on call  ID 3/15/24 regarding urine culture result who recommended no treatment at that time as patient asymptomatic without urinary symptoms or flank pain.  Patient had temp 100.7F later that evening and was started on Levaquin 500 mg daily x 5 days.  8.  Dysphagia.  Speech therapy following.  Dysphagia 1 puureed with nectar thick liquid.  Video swallow study done on 3/11/2024.  9.  Vitamin B12 deficiency.  Patient started on vitamin B12 500 mcg daily.    10.  Pain and bowel management.  As per physiatrist.  11.  Intracranial hemorrhage.   Patient is receiving intensive PT OT ST as per physiatrist.    Anticipated discharge date:  TBD  PCP:  RONY Diaz

## 2024-03-26 NOTE — CASE MANAGEMENT
CM met with patient, patient's wife Carol, and son, inquired if our therapist could do a home eval, Gina agreeable to this. CM inquired if Gina found the long term care insurance policy. Gina stated she found it, her daughter Allegra is copying it. When she gets her copy she will bring it in to the hospital for CM to have policy number to call for HHA coverage on discharge. CM will continue to follow.

## 2024-03-26 NOTE — NURSING NOTE
Incont urine x 1 and voided in BR.  See PVR bladder scan .   Continue to do bladder training and voiding interventions.  Pt in no distress and offers no complaints.

## 2024-03-26 NOTE — PROGRESS NOTES
03/26/24 0858   Pain Assessment   Pain Assessment Tool 0-10   Pain Score No Pain   Restrictions/Precautions   Precautions Aphasia;Aspiration;Bed/chair alarms;Cognitive;Fall Risk;Hard of hearing;Impulsive;Supervision on toilet/commode   Cognition   Overall Cognitive Status Impaired   Arousal/Participation Alert;Responsive;Cooperative   Attention Attends with cues to redirect   Orientation Level Oriented to person;Oriented to place   Memory Decreased short term memory;Decreased recall of recent events;Decreased recall of precautions   Following Commands Follows one step commands with increased time or repetition   Roll Left and Right   Type of Assistance Needed Supervision   Comment bed rails   Roll Left and Right CARE Score 4   Sit to Lying   Type of Assistance Needed Supervision   Comment bed rails   Sit to Lying CARE Score 4   Lying to Sitting on Side of Bed   Type of Assistance Needed Incidental touching   Comment cg bed rails   Lying to Sitting on Side of Bed CARE Score 4   Sit to Stand   Type of Assistance Needed Incidental touching   Comment cg with RW; increased visual/tactile cues for sequence/technique/hand placement   Sit to Stand CARE Score 4   Bed-Chair Transfer   Type of Assistance Needed Incidental touching   Comment cg with RW; increased visual/tactile cues for sequence/technique/hand placement   Chair/Bed-to-Chair Transfer CARE Score 4   Walk 10 Feet   Type of Assistance Needed Incidental touching   Comment cg level and unlevel surfaces with RW; increased visual/tactile cues for sequence/direction   Walk 10 Feet CARE Score 4   Walk 50 Feet with Two Turns   Type of Assistance Needed Incidental touching   Comment cg level and unlevel surfaces with RW; increased visual/tactile cues for sequence/direction   Walk 50 Feet with Two Turns CARE Score 4   Walk 150 Feet   Type of Assistance Needed Incidental touching   Comment cg level and unlevel surfaces with RW; increased visual/tactile cues for  sequence/direction   Walk 150 Feet CARE Score 4   Walking 10 Feet on Uneven Surfaces   Type of Assistance Needed Incidental touching   Comment cg level and unlevel surfaces with RW; increased visual/tactile cues for sequence/direction   Walking 10 Feet on Uneven Surfaces CARE Score 4   Ambulation   Primary Mode of Locomotion Prior to Admission Walk   Distance Walked (feet)   (18' ( to bathroom ) 157' 87' 152')   Assist Device Roller Walker   Gait Pattern Inconsistant Maria Victoria;Slow Maria Victoria;Decreased foot clearance;Forward Flexion;Narrow ILIANA   Limitations Noted In Balance;Device Management;Endurance;Heel Strike;Posture;Safety;Speed;Strength   Provided Assistance with: Balance;Direction   Walk Assist Level Contact Guard   Findings cg level and unlevel surfaces with RW; increased visual/tactile cues for sequence/direction   Does the patient walk? 2. Yes   Toilet Transfer   Type of Assistance Needed Incidental touching   Comment cg using walker/grab bar; cues for sequence/hand placement   Toilet Transfer CARE Score 4   Therapeutic Interventions   Strengthening supine and seated ther ex   Balance gait and transfer training   Assessment   Treatment Assessment Patient agreeable to therapy session.   No pain reported.   Increased fatigue noted today as patient took longer to arouse.   Continues to requires increased visual/tactile cues for sequence/technique/direction for all tasks.   Completed ther ex for general LE strengthening; gait and transfer training focusing on sequence and technique for improved balance and safety with functional mobility using walker.   Will see patient later today for family training with wife.   PT Barriers   Physical Impairment Decreased strength;Decreased range of motion;Decreased endurance;Impaired balance;Decreased mobility;Decreased cognition;Impaired judgement;Decreased safety awareness;Impaired hearing   Functional Limitation Wheelchair management;Walking;Transfers;Standing;Stair  negotiation;Ramp negotiation;Car transfers   Plan   Treatment/Interventions Functional transfer training;LE strengthening/ROM;Therapeutic exercise;Bed mobility;Gait training   Progress Progressing toward goals   PT Therapy Minutes   PT Time In 0858   PT Time Out 1025   PT Total Time (minutes) 87   PT Mode of treatment - Individual (minutes) 87   PT Mode of treatment - Concurrent (minutes) 0   PT Mode of treatment - Group (minutes) 0   PT Mode of treatment - Co-treat (minutes) 0   PT Mode of Treatment - Total time(minutes) 87 minutes   PT Cumulative Minutes 2331   Therapy Time missed   Time missed? No

## 2024-03-26 NOTE — PLAN OF CARE
Problem: PAIN - ADULT  Goal: Verbalizes/displays adequate comfort level or baseline comfort level  Description: Interventions:  - Encourage patient to monitor pain and request assistance  - Assess pain using appropriate pain scale  - Administer analgesics based on type and severity of pain and evaluate response  - Implement non-pharmacological measures as appropriate and evaluate response  - Consider cultural and social influences on pain and pain management  - Notify physician/advanced practitioner if interventions unsuccessful or patient reports new pain  Outcome: Progressing     Problem: INFECTION - ADULT  Goal: Absence or prevention of progression during hospitalization  Description: INTERVENTIONS:  - Assess and monitor for signs and symptoms of infection  - Monitor lab/diagnostic results  - Monitor all insertion sites, i.e. indwelling lines, tubes, and drains  - Monitor endotracheal if appropriate and nasal secretions for changes in amount and color  - [place] appropriate cooling/warming therapies per order  - Administer medications as ordered  - Instruct and encourage patient and family to use good hand hygiene technique  - Identify and instruct in appropriate isolation precautions for identified infection/condition  Outcome: Progressing     Problem: SAFETY ADULT  Goal: Patient will remain free of falls  Description: INTERVENTIONS:  - Educate patient/family on patient safety including physical limitations  - Instruct patient to call for assistance with activity   - Consult OT/PT to assist with strengthening/mobility   - Keep Call bell within reach  - Keep bed low and locked with side rails adjusted as appropriate  - Keep care items and personal belongings within reach  - Initiate and maintain comfort rounds  - Make Fall Risk Sign visible to staff  - Offer Toileting every 2 Hours, in advance of need  - Initiate/Maintain bed/chair alarm  - Apply yellow socks and bracelet for high fall risk patients  -  Consider moving patient to room near nurses station  Outcome: Progressing     Problem: Prexisting or High Potential for Compromised Skin Integrity  Goal: Skin integrity is maintained or improved  Description: INTERVENTIONS:  - Identify patients at risk for skin breakdown  - Assess and monitor skin integrity  - Assess and monitor nutrition and hydration status  - Monitor labs   - Assess for incontinence   - Turn and reposition patient  - Assist with mobility/ambulation  - Relieve pressure over bony prominences  - Avoid friction and shearing  - Provide appropriate hygiene as needed including keeping skin clean and dry  - Evaluate need for skin moisturizer/barrier cream  - Collaborate with interdisciplinary team   - Patient/family teaching  - Consider wound care consult   Outcome: Progressing     Problem: Nutrition/Hydration-ADULT  Goal: Nutrient/Hydration intake appropriate for improving, restoring or maintaining nutritional needs  Description: Monitor and assess patient's nutrition/hydration status for malnutrition. Collaborate with interdisciplinary team and initiate plan and interventions as ordered.  Monitor patient's weight and dietary intake as ordered or per policy. Utilize nutrition screening tool and intervene as necessary. Determine patient's food preferences and provide high-protein, high-caloric foods as appropriate.     INTERVENTIONS:  - Monitor oral intake, urinary output, labs, and treatment plans  - Assess nutrition and hydration status and recommend course of action  - Evaluate amount of meals eaten  - Assist patient with eating if necessary   - Allow adequate time for meals  - Recommend/ encourage appropriate diets, oral nutritional supplements, and vitamin/mineral supplements  - Order, calculate, and assess calorie counts as needed  - Recommend, monitor, and adjust tube feedings and TPN/PPN based on assessed needs  - Assess need for intravenous fluids  - Provide specific nutrition/hydration  education as appropriate  - Include patient/family/caregiver in decisions related to nutrition  Outcome: Progressing

## 2024-03-26 NOTE — PROGRESS NOTES
03/26/24 1030   Pain Assessment   Pain Assessment Tool 0-10   Pain Score No Pain   Restrictions/Precautions   Precautions Aspiration;Bed/chair alarms;Cognitive;Fall Risk;Hard of hearing;Impulsive;Supervision on toilet/commode   Weight Bearing Restrictions No   ROM Restrictions No   Oral Hygiene   Type of Assistance Needed Supervision   Physical Assistance Level No physical assistance   Comment visual cues for thoroughness   Oral Hygiene CARE Score 4   Grooming   Able To Comb/Brush Hair;Wash/Dry Face;Brush/Clean Teeth   Limitation Noted In Problem Solving;Safety;Sequencing   Shower/Bathe Self   Type of Assistance Needed Physical assistance   Physical Assistance Level 26%-50%   Comment assist for buttocks, bilat lower LE, perineal thoroughness; visual and physical cues for thoroughness and to sequence   Shower/Bathe Self CARE Score 3   Bathing   Assessed Bath Style Shower   Anticipated D/C Bath Style Shower;Sponge Bath   Able to Gather/Transport No   Able to Adjust Water Temperature No   Able to Wash/Rinse/Dry (body part) Left Arm;Right Arm;L Upper Leg;R Upper Leg;Chest;Abdomen   Limitations Noted in Balance;Endurance;ROM;Problem Solving;Safety;Sequencing   Positioning Seated;Standing   Adaptive Equipment Shower Bars;Shower Seat;Hand Held Shower   Tub/Shower Transfer   Limitations Noted In Balance;Endurance;Problem Solving;Safety;Sequencing;LE Strength   Adaptive Equipment Grab Bars;Seat with Back   Assessed Shower   Findings CG with significant visual and physical cues for hand placement and safety   Upper Body Dressing   Type of Assistance Needed Physical assistance   Physical Assistance Level 26%-50%   Comment assist to initiate doffing flannel and t-shirt, don flannel shirt   Upper Body Dressing CARE Score 3   Lower Body Dressing   Type of Assistance Needed Physical assistance   Physical Assistance Level 76% or more   Comment able to pull clothing down and up over hips and buttocks with visual and physical cues  to initiate   Lower Body Dressing CARE Score 2   Putting On/Taking Off Footwear   Type of Assistance Needed Physical assistance   Physical Assistance Level 76% or more   Comment able to present bilat feet to OT to doff/don footwear   Putting On/Taking Off Footwear CARE Score 2   Dressing/Undressing Clothing   Remove UB Clothes Pullover Shirt;Jacket   Don UB Clothes Pullover Shirt;Jacket   Remove LB Clothes Pants;Socks;Undergarment   Don LB Clothes Pants;Undergarment;Socks;TEDs   Limitations Noted In Balance;Endurance;Problem Solving;Safety;Sequencing;ROM   Positioning Supported Sit   Sit to Lying   Type of Assistance Needed Supervision   Physical Assistance Level No physical assistance   Sit to Lying CARE Score 4   Lying to Sitting on Side of Bed   Type of Assistance Needed Incidental touching   Comment CG   Lying to Sitting on Side of Bed CARE Score 4   Sit to Stand   Type of Assistance Needed Supervision   Physical Assistance Level No physical assistance   Sit to Stand CARE Score 4   Toileting Hygiene   Type of Assistance Needed Physical assistance   Physical Assistance Level 76% or more   Comment able to pull R side of pants and underwear over R hip with physical and visual cues   Toileting Hygiene CARE Score 2   Toileting   Able to Pull Clothing   (able to complete R side)   Manage Hygiene Bladder   Limitations Noted In Balance;Problem Solving;ROM;Safety;Sequencing;LE Strength   Adaptive Equipment Grab Bar   Toilet Transfer   Type of Assistance Needed Supervision   Physical Assistance Level No physical assistance   Toilet Transfer CARE Score 4   Toilet Transfer   Surface Assessed Raised Toilet   Transfer Technique Standard   Limitations Noted In Balance;Endurance;Problem Solving;Safety;LE Strength;Sequencing   Adaptive Equipment Grab Bar;Walker   Cognition   Overall Cognitive Status Impaired   Arousal/Participation Alert;Responsive;Cooperative   Attention Attends with cues to redirect   Orientation Level  Oriented to person;Oriented to place   Memory Decreased short term memory;Decreased recall of recent events;Decreased recall of precautions   Following Commands Follows one step commands with increased time or repetition   Assessment   Treatment Assessment Pt agreeable to OT session this AM. Received sitting in recliner. ADL session completed; current LOF and details listed in respective sections. Pt's wife present for ADL session and completed family training as well as hands on assist. MaxA LB dressing and toileting, Brayan UB dressing and bathing, supervision grooming; fxl transfers and mobility with RW overall supervision. Visual and physical cues required for sequencing and thoroughness during all tasks. Pt's wife appeared comfortable with pt's current LOF, however did schedule further family training with OT later this week to ensure routine and tasks are manageable at home. Pt's barriers to d/c include decreased strength throughout, decreased balance, impaired hearing, impaired cognition including safety awareness, problem solving, attention, comprehension, and expression, and decreased activity tolerance; all affect independence in self care and fxl transfers. Pt would benefit from continued skilled OT services in order to address listed barriers and prepare for safe d/c.   Prognosis Fair   Problem List Decreased strength;Decreased range of motion;Decreased endurance;Impaired balance;Decreased coordination;Decreased cognition;Decreased safety awareness;Impaired hearing   Plan   Treatment/Interventions ADL retraining;Functional transfer training;LE strengthening/ROM;Therapeutic exercise;Endurance training;Cognitive reorientation;Patient/family training;Equipment eval/education;Compensatory technique education;OT   Progress Progressing toward goals   OT Therapy Minutes   OT Time In 1030   OT Time Out 1200   OT Total Time (minutes) 90   OT Mode of treatment - Individual (minutes) 90

## 2024-03-26 NOTE — NURSING NOTE
Ambulated to BR CGA/RW but unable to void.  Will continue to monitor.  Verbalized orientation to person, place and verbalized time at 2023 instead of 2024.  In good spirits, wife visiting.  Will encourage fluids and voiding q 2 hours.  Denies any complaints.

## 2024-03-26 NOTE — PROGRESS NOTES
03/26/24 1300   Pain Assessment   Pain Assessment Tool 0-10   Pain Score No Pain   Restrictions/Precautions   Precautions Aphasia;Aspiration;Bed/chair alarms;Cognitive;Fall Risk;Hard of hearing;Impulsive;Supervision on toilet/commode   Cognition   Overall Cognitive Status Impaired   Arousal/Participation Alert;Responsive;Cooperative   Attention Attends with cues to redirect   Orientation Level Oriented to person;Oriented to place   Memory Decreased short term memory;Decreased recall of recent events;Decreased recall of precautions   Following Commands Follows one step commands with increased time or repetition   Sit to Stand   Type of Assistance Needed Incidental touching   Comment cg with RW; increased visual/tactile cues for sequence/technique/hand placement   Sit to Stand CARE Score 4   Bed-Chair Transfer   Type of Assistance Needed Incidental touching   Comment cg with RW; increased visual/tactile cues for sequence/technique/hand placement   Chair/Bed-to-Chair Transfer CARE Score 4   Walk 10 Feet   Type of Assistance Needed Incidental touching   Comment cg level and unlevel surfaces with RW; increased visual/tactile cues for sequence/direction (P)   Walk 10 Feet CARE Score 4   Walk 50 Feet with Two Turns   Type of Assistance Needed Incidental touching   Comment cg level and unlevel surfaces with RW; increased visual/tactile cues for sequence/direction (P)   Walk 50 Feet with Two Turns CARE Score 4   Walk 150 Feet   Type of Assistance Needed Incidental touching   Comment cg level and unlevel surfaces with RW; increased visual/tactile cues for sequence/direction (P)   Walk 150 Feet CARE Score 4   Walking 10 Feet on Uneven Surfaces   Type of Assistance Needed Incidental touching   Comment cg level and unlevel surfaces with RW; increased visual/tactile cues for sequence/direction (P)   Walking 10 Feet on Uneven Surfaces CARE Score 4   Ambulation   Primary Mode of Locomotion Prior to Admission Walk   Distance  Walked (feet) 172 ft  (18' ( to bathroom) 172' 25'(around PT gym) 156' 137')   Assist Device Roller Walker   Gait Pattern Inconsistant Maria Victoria;Slow Maria Victoria;Decreased foot clearance;Forward Flexion;Narrow ILIANA   Limitations Noted In Balance;Device Management;Endurance;Heel Strike;Posture;Safety;Speed;Strength   Provided Assistance with: Balance;Direction   Walk Assist Level Contact Guard   Findings cg level and unlevel surfaces with RW; increased visual/tactile cues for sequence/direction   Does the patient walk? 2. Yes   Curb or Single Stair   Style negotiated Single stair   Type of Assistance Needed Incidental touching   Comment cg using 2 handrails; increased visual/tactile cues for sequence/direction   1 Step (Curb) CARE Score 4   4 Steps   Type of Assistance Needed Incidental touching   Comment cg using 2 handrails; increased visual/tactile cues for sequence/direction   4 Steps CARE Score 4   12 Steps   Type of Assistance Needed Incidental touching   Comment cg using 2 handrails; increased visual/tactile cues for sequence/direction   12 Steps CARE Score 4   Stairs   Type Stairs;Ramp   # of Steps 14   Weight Bearing Precautions WBAT   Assist Devices Bilateral Rail;Roller Walker   Findings cg using 2 handrails; increased visual/tactile cues for sequence/direction; cg ramp with RW and cues for direction   Toilet Transfer   Type of Assistance Needed Incidental touching   Comment cg using walker/grab bar; cues for sequence/hand placement   Toilet Transfer CARE Score 4   Therapeutic Interventions   Balance gait and transfer training   Other ramp and stair negotiation   Equipment Use   NuStep L1 x 10 min BUE/BLE   Assessment   Treatment Assessment Patient agreeable to therapy session.   Remains pain free. Pt able to urinate before leaving room for therapy.   Pt's wife in for training.  Continue to review mobility with RW including ramp and stair negotiation.     Pt's wife requires cueing to cue patient at times.   Wife  feels comfortable with SUSANNE needed.   Completed gait and transfer training focusing on sequence and technique for improved balance and safety with functional mobility using walker.  Negotiated ramp cg with RW, steps with 2 handrails cg; cues for sequence and technique for both activities.   PT Barriers   Physical Impairment Decreased strength;Decreased range of motion;Decreased endurance;Impaired balance;Decreased mobility;Decreased cognition;Impaired judgement;Decreased safety awareness;Impaired hearing   Functional Limitation Wheelchair management;Walking;Transfers;Standing;Stair negotiation;Ramp negotiation;Car transfers   Plan   Treatment/Interventions Functional transfer training;LE strengthening/ROM;Therapeutic exercise;Bed mobility;Gait training   Progress Progressing toward goals   PT Therapy Minutes   PT Time In 1300   PT Time Out 1400   PT Total Time (minutes) 60   PT Mode of treatment - Individual (minutes) 60   PT Mode of treatment - Concurrent (minutes) 0   PT Mode of treatment - Group (minutes) 0   PT Mode of treatment - Co-treat (minutes) 0   PT Mode of Treatment - Total time(minutes) 60 minutes   PT Cumulative Minutes 2331   Therapy Time missed   Time missed? No

## 2024-03-26 NOTE — NURSING NOTE
Patient voided around 50ml at 1945. PVR of 793. Was going to attempt straight cath but patient then voided into brief.  at 2015. Orders noted to straight cath if PVR >450ml. Will continue to monitor and recheck PVR in 4 hours.

## 2024-03-27 PROCEDURE — 99233 SBSQ HOSP IP/OBS HIGH 50: CPT | Performed by: PHYSICAL MEDICINE & REHABILITATION

## 2024-03-27 PROCEDURE — 97530 THERAPEUTIC ACTIVITIES: CPT

## 2024-03-27 PROCEDURE — 97116 GAIT TRAINING THERAPY: CPT

## 2024-03-27 PROCEDURE — 92526 ORAL FUNCTION THERAPY: CPT | Performed by: NURSE PRACTITIONER

## 2024-03-27 PROCEDURE — 97130 THER IVNTJ EA ADDL 15 MIN: CPT

## 2024-03-27 PROCEDURE — 92507 TX SP LANG VOICE COMM INDIV: CPT | Performed by: NURSE PRACTITIONER

## 2024-03-27 PROCEDURE — 97129 THER IVNTJ 1ST 15 MIN: CPT

## 2024-03-27 PROCEDURE — 97110 THERAPEUTIC EXERCISES: CPT

## 2024-03-27 PROCEDURE — 97537 COMMUNITY/WORK REINTEGRATION: CPT

## 2024-03-27 RX ADMIN — Medication 1 TABLET: at 09:37

## 2024-03-27 RX ADMIN — SERTRALINE HYDROCHLORIDE 25 MG: 25 TABLET ORAL at 09:37

## 2024-03-27 RX ADMIN — MIDODRINE HYDROCHLORIDE 5 MG: 5 TABLET ORAL at 12:08

## 2024-03-27 RX ADMIN — LORATADINE 10 MG: 10 TABLET ORAL at 09:37

## 2024-03-27 RX ADMIN — MIDODRINE HYDROCHLORIDE 5 MG: 5 TABLET ORAL at 18:10

## 2024-03-27 RX ADMIN — TAMSULOSIN HYDROCHLORIDE 0.4 MG: 0.4 CAPSULE ORAL at 18:10

## 2024-03-27 RX ADMIN — ENOXAPARIN SODIUM 40 MG: 40 INJECTION SUBCUTANEOUS at 09:37

## 2024-03-27 RX ADMIN — MIDODRINE HYDROCHLORIDE 5 MG: 5 TABLET ORAL at 09:37

## 2024-03-27 RX ADMIN — CYANOCOBALAMIN TAB 500 MCG 500 MCG: 500 TAB at 09:37

## 2024-03-27 RX ADMIN — Medication 9 MG: at 21:40

## 2024-03-27 RX ADMIN — SENNOSIDES 8.6 MG: 8.6 TABLET, FILM COATED ORAL at 21:40

## 2024-03-27 RX ADMIN — PRAVASTATIN SODIUM 40 MG: 40 TABLET ORAL at 18:10

## 2024-03-27 NOTE — PROGRESS NOTES
Progress Note - Thomas Chase 83 y.o. male MRN: [id number]    Unit/Bed#: [facility] 213-02 Encounter: [id number]        Subjective:   Patient seen and examined at bedside after reviewing the chart and discussing the case with the caring staff.      Patient examined at bedside.  Patient has no acute symptoms.      Physical Exam   Vitals: Blood pressure 90/55, pulse 75, temperature 97.5 °F (36.4 °C), temperature source Temporal, resp. rate 16, height 6' (1.829 m), weight 57.8 kg (127 lb 6.8 oz), SpO2 93%.,Body mass index is 17.28 kg/m².  Constitutional: Patient in no acute distress.  HEENT: PERR, EOMI, MMM.  Cardiovascular: Normal rate and regular rhythm.    Pulmonary/Chest: Effort normal and breath sounds normal.   Abdomen: Soft, + BS, NT.    Assessment/Plan:  Thomas Chase is a(n) 83 y.o. year old male with left SDH and right SAH.     1.  Cardiac with hx of HTN, HLD/hypotension.  On pravastatin 40 mg daily (simvastatin nonformulary).  Noted to have low BP 3/10/24.  Patient's orthostatic vitals did not show significant drop.  Received IV fluids and started on midodrine.  Midodrine increased to 5 mg TID 3/21/24.  Continue to monitor.     2.  Allergic rhinitis.  Patient is on loratadine 10 mg daily.  3.  Depression/anxiety.  Patient is on Zoloft 25 mg daily.  4.  Insomnia.  Patient is on melatonin 6 mg at bedtime.  5.  Thrush.  Completed clotrimazole lozenges x 10 days on 3/21/24.   6.  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  Patient is mainly nonverbal.  7.  Urinary retention/enterococcal UTI.  Urinary retention protocol.  Flomax decreased to 0.4 mg daily 3/12/2024 due to possible cause of hypotension.  Finley catheter placed 2/22/24, removed on 2/28/24, placed again 3/1/24, removed again 3/7/24 and reinserted 3/8/24.    Voiding trail started 3/22/24 and has been having continent and incontinent episodes with improving PVRs.   8.  Dysphagia.  Speech therapy following.  Dysphagia 1 puureed with nectar  thick liquid.  Video swallow study done on 3/11/2024.  9.  Vitamin B12 deficiency.  Patient started on vitamin B12 500 mcg daily.    10.  Pain and bowel management.  As per physiatrist.  11.  Intracranial hemorrhage.   Patient is receiving intensive PT OT ST as per physiatrist.    Anticipated discharge date:  TBD  PCP:  RONY Diaz    The patient was discussed with Dr. Paz and he is in agreement with the above note.

## 2024-03-27 NOTE — PROGRESS NOTES
03/27/24 1243   Pain Assessment   Pain Assessment Tool 0-10   Pain Score No Pain   Restrictions/Precautions   Precautions Aphasia;Aspiration;Bed/chair alarms;Cognitive;Fall Risk;Hard of hearing;Impulsive;Supervision on toilet/commode   Cognition   Overall Cognitive Status Impaired   Arousal/Participation Alert;Responsive;Cooperative   Attention Attends with cues to redirect   Orientation Level Oriented to person;Oriented to place   Memory Decreased short term memory;Decreased recall of recent events;Decreased recall of precautions   Following Commands Follows one step commands with increased time or repetition   Sit to Stand   Type of Assistance Needed Incidental touching;Supervision   Comment cg/close S; visual/tactile cues for sequence/technique/hand placement   Sit to Stand CARE Score 4   Bed-Chair Transfer   Type of Assistance Needed Incidental touching;Supervision   Comment cg/close S; visual/tactile cues for sequence/technique/hand placement   Chair/Bed-to-Chair Transfer CARE Score 4   Walk 10 Feet   Type of Assistance Needed Incidental touching   Comment cg level and unlevel surfaces with RW; increased visual/tactile cues for sequence/direction   Walk 10 Feet CARE Score 4   Walk 50 Feet with Two Turns   Type of Assistance Needed Incidental touching   Comment cg level and unlevel surfaces with RW; increased visual/tactile cues for sequence/direction   Walk 50 Feet with Two Turns CARE Score 4   Walk 150 Feet   Type of Assistance Needed Incidental touching   Comment cg level and unlevel surfaces with RW; increased visual/tactile cues for sequence/direction   Walk 150 Feet CARE Score 4   Walking 10 Feet on Uneven Surfaces   Type of Assistance Needed Incidental touching   Comment cg level and unlevel surfaces with RW; increased visual/tactile cues for sequence/direction   Walking 10 Feet on Uneven Surfaces CARE Score 4   Ambulation   Primary Mode of Locomotion Prior to Admission Walk   Distance Walked (feet)  157 ft  (157' 25' x 2 (to and from steps in PT gym) 137')   Assist Device Roller Walker   Gait Pattern Inconsistant Maria Victoria;Slow Maria Victoria;Decreased foot clearance;Forward Flexion;Narrow ILIANA   Limitations Noted In Balance;Device Management;Endurance;Heel Strike;Posture;Safety;Speed;Strength   Provided Assistance with: Balance;Direction   Walk Assist Level Contact Guard   Findings cg level and unlevel surfaces with RW; increased visual/tactile cues for sequence/direction   Does the patient walk? 2. Yes   Curb or Single Stair   Style negotiated Single stair   Type of Assistance Needed Incidental touching;Supervision   Comment cg/close S using 2 handrails; visual/tactile cues for sequence/direction   1 Step (Curb) CARE Score 4   4 Steps   Type of Assistance Needed Incidental touching;Supervision   Comment cg/close S using 2 handrails; visual/tactile cues for sequence/direction   4 Steps CARE Score 4   12 Steps   Type of Assistance Needed Incidental touching;Supervision   Comment cg/close S using 2 handrails; visual/tactile cues for sequence/direction   12 Steps CARE Score 4   Stairs   Type Stairs   # of Steps 14   Weight Bearing Precautions WBAT;Fall Risk   Assist Devices Bilateral Rail   Findings cg/close S using 2 handrails; visual/tactile cues for sequence/direction   Toilet Transfer   Type of Assistance Needed Incidental touching;Supervision   Comment cg rw/grab bar; increased visual/tactile cues for sequence/technique   Toilet Transfer CARE Score 4   Therapeutic Interventions   Balance gait and transfer training   Other stair negotiation   Assessment   Treatment Assessment Patient agreeable to therapy session.   No pain reported.   Continued family training with pt and pt's wife.    Increased visual/tactile cues for sequence/direction/technique/hand placement for all tasks.   Pt's wife also required occasional cues to safely cue patient for functional mobility.   Patient taken to bathroom at beginning of session.   Pt was dry, however, did not void.  Nursing aware.   PT Barriers   Physical Impairment Decreased strength;Decreased range of motion;Decreased endurance;Impaired balance;Decreased mobility;Decreased cognition;Impaired judgement;Decreased safety awareness;Impaired hearing   Functional Limitation Wheelchair management;Walking;Transfers;Standing;Stair negotiation;Ramp negotiation;Car transfers   Plan   Treatment/Interventions Functional transfer training;Elevations;Gait training   Progress Progressing toward goals   PT Therapy Minutes   PT Time In 1243   PT Time Out 1308   PT Total Time (minutes) 25   PT Mode of treatment - Individual (minutes) 25   PT Mode of treatment - Concurrent (minutes) 0   PT Mode of treatment - Group (minutes) 0   PT Mode of treatment - Co-treat (minutes) 0   PT Mode of Treatment - Total time(minutes) 25 minutes   PT Cumulative Minutes 2381   Therapy Time missed   Time missed? No

## 2024-03-27 NOTE — CASE MANAGEMENT
CM met with patient and wife, reviewed team meeting, inquired if she wanted a referral sent to [facility] , wife stated yes. CM called [facility] rehab, spoke with aHndy, made him aware of potential for new referral an d/c this week. Handy requested updated information. CM updated Aidin referral with today's notes from Dr. Lugo, Dr Paz and therapists.Awaiting response from [facility] subacute rehab.

## 2024-03-27 NOTE — TEAM CONFERENCE
Acute RehabilitationTeam Conference Note  Date: 3/27/2024   Time: 11:33 AM       Patient Name:  Thomas Chase       Medical Record Number: [id number]   YOB: 1940  Sex: Male          Room/Bed:  [facility] 213/[facility] 213-02  Payor Info:  Payor: MEDICARE / Plan: MEDICARE A AND B / Product Type: Medicare A & B Fee for Service /      Admitting Diagnosis: Cl fracture base skull wth intracran hemorrhage, no loss consciousness (HCC) [S02.109A, S06.300A]   Admit Date/Time:  2/20/2024  3:13 PM  Admission Comments: No comment available     Primary Diagnosis:  Intracranial hemorrhage (HCC)  Principal Problem: Intracranial hemorrhage (HCC)    Patient Active Problem List    Diagnosis Date Noted    Insomnia 03/21/2024    Abnormal CT of the head 03/21/2024    Hematuria 03/04/2024    Headache 02/20/2024    Behavior safety risk 02/20/2024    Bilateral lower extremity weakness 02/17/2024    Abnormal CT scan, lumbar spine 02/15/2024    Severe protein-calorie malnutrition (HCC) 02/14/2024    Debility 02/13/2024    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    Dysphagia 11/21/2023    Macular degeneration, age related 02/27/2023    H/O traumatic subdural hematoma 02/27/2023    Sensorineural hearing loss (SNHL), bilateral 08/18/2022    Balance disorder 06/28/2022    Abnormal echocardiogram 06/27/2022    Right bundle-branch block 06/27/2022    Low BP 06/19/2022    Diastolic dysfunction 06/19/2022    EKG abnormalities 06/19/2022    Constipation due to neurogenic bowel 06/02/2022    Urinary retention 06/02/2022    SDH (subdural hematoma) ([facility]) 05/27/2022    Primary osteoarthritis of left knee 05/17/2022    Hygroma 04/26/2022    Right hand pain     Carpal tunnel syndrome on right     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    Overweight (BMI 25.0-29.9) 01/27/2021    Negative depression screening 09/26/2019    Hypercholesterolemia 07/12/2018    Borderline hypertension 07/12/2018    Hearing loss 04/08/2009       Physical Therapy:    Weight  Bearing Status: Weight Bearing as Tolerated  Transfers: Incidental Touching, Supervision  Bed Mobility: Incidental Touching, Supervision  Amulation Distance (ft): 206 feet  Ambulation: Incidental Touching  Assistive Device for Ambulation: Roller Walker  Wheelchair Mobility Distance: 133 ft  Wheelchair Mobility: Maximum Assistance (max visual and tactile cues)  Number of Stairs: 14  Assistive Device for Stairs: Bilateral Hand Rails  Stair Assistance: Incidental Touching  Ramp: Incidental Touching  Assistive Device for Ramp: Roller Walker  Discharge Recommendations: Home with:  DC Home with:: First Floor Setup, Home Physical Therapy     02.21/2024:   Patient seen today for IE.  Presents, following hospitalizaton  for  traumatic brain dysfunction (L SDH, R SAH, L anterior hypodensity lateral temporal lobe) and PMH significant for previous fall with TBI (SDH with L craniotomy), chronic aphasia, hard of hearing with cochlear implants in place, and hyperlipidemia.    Patient MIN A bed mobility, MOD A transfers with walker, MAX A wheelchair mobility level and unlevel surfaces up to 133' with increased visual and tactile cues, min-mod A ambulation up to 66' level and unlevel surfaces with walker and wheelchair follow.  Patient limited by decreased ROM/strength, decreased balance and safety, decreased endurance, impaired  cognition, and impaired hearing (patient wears cochlear implants).   May benefit from continued inpatient [facility] PT to increase function, safety, and increased independence in prep for safe d/c to home with family and continued PT services as needed.    02/28/2024:   Patient participating in therapy and making positive gains.  Patient CG/S bed mobility, CG transfers with walker, CG ambulation up to 167' level and unlevel surfaces with walker, cg negotiation of 7 steps with 2 handrails.   Patient requires increased visual and tactile cues for general safety, Finley management, and task sequence.  Patient also  remains limited by decreased ROM/strength, decreased balance and safety, decreased endurance, impaired cognition, aphasia, and impaired hearing.  Patient may benefit from continued inpatient [facility] PT to increase function, safety, and increased independence in prep for safe d/c to home with family and continued PT services.    03/06/2024:   Patient participating in therapy and continuing to make positive gains.   Patient CG/S bed mobility, CG/close S transfers with walker, CG/close S ambulation up to 180' level and unlevel surfaces with walker, MIN A ambulation outside on uneven pavement/sidewalks, CG/close S negotiation of steps with 2 handrails, CG ramp with walker.  Patient remains limited by decreased ROM/strength, decreased balance and safety, decreased endurance, impaired cognition, aphasia, and impaired hearing.   Patient may benefit from continued inpatient [facility] PT to increase function, safety, and increased independence in prep for safe d/c to home with family and continued PT services as needed.    03/13/2024:   Patient participating in therapy.   Patient S bed mobility with bed rails, CG/close S transfers with walker, cg ambulation up to 190' level and unlevel surfaces with walker, CG ramp with walker, cg 14 steps with 2 handrails.   Remains limited by decreased ROM/strength, decreased balance and safety, decreased endurance, impaired cognition, aphasia, and impaired hearing.  May benefit from continued inpatient [facility] PT to increase function, safety, and independence in prep for safe d/c to home with family and continued PT services as needed vs alternate placement.    03/20/2024:  Patient participating in therapy.  Patient S bed mobility, close S/S STS transfers with walker, CG SPT with walker, CG ambulation up to 206' level and unlevel surfaces with walker, CG ramp with walker, cg 14 steps with 2 handrails.  Patient continues to require increased visual/tactile cues for sequence, technique, and direction to  safely complete all tasks.  Remains limited by decreased ROM/strength, decreased balance and safety, decreased endurance, impaired cognition, aphasia, impaired hearing.  Awaiting SNF bed.    03/27/2024:   Patient participating in therapy.   Patient CG/S bed mobility using bed rail; cg/close S STS with walker, CG SPT with walker, CG ambulation with walker level and unlevel surfaces, CG ramp with walker, CG 14 steps with 2 handrails.   Patient continues to require increased visual/tactile cues for sequence, technique, hand placement, and direction to safely complete all tasks.    Finley removed.   Working on family training with wife in anticipation of d/c to home vs alternate placement.   Remains limited by decreased ROM/strength, decreased balance and safety, decreased endurance, impaired cognition, aphasia, and impaired hearing.  Continue POC.      Occupational Therapy:  Eating: Supervision  Grooming: Supervision  Bathing: Minimal Assistance  Bathing: Minimal Assistance  Upper Body Dressing: Minimal Assistance  Lower Body Dressing: Maximum Assistance  Toileting: Maximum Assistance  Tub/Shower Transfer: Incidental Touching  Toilet Transfer: Supervision  Cognition: Exceptions to WNL  Cognition: Decreased Executive Functions, Decreased Attention, Decreased Comprehension, Decreased Safety  Orientation: Person, Place, Time  Discharge Recommendations: Home with: (vs. SNF)  DC Home with:: 24 Hour Supervision, Family Support, First Floor Setup, Home Occupational Therapy       2/21/24: Pt new admit to ARU. Current LOF listed above. Barriers to d/c include decreased strength throughout, decreased balance, impaired cognition including safety awareness, problem solving, attention, comprehension, expression, and short term memory, and decreased activity tolerance; all affect independence in self care and fxl transfers. Pt will participate in ADL training, therapeutic exercises and activities, fxl mobility/transfers, cognitive  training, and activity tolerance in order to progress towards goals. Pt would benefit from continued skilled OT services in order to address listed barriers and prepare for safe d/c.     2/28/24: Pt's current LOF listed above. Continues with barriers to d/c of decreased strength throughout, decreased balance, impaired cognition including safety awareness, problem solving, attention, comprehension, expression, and short term memory, and decreased activity tolerance; all affect independence in self care and fxl transfers. Pt participating in ADL training, therapeutic exercises and activities, fxl mobility/transfers, cognitive training, and activity tolerance in order to progress towards goals. Pt would benefit from continued skilled OT services in order to address listed barriers and prepare for safe d/c.      3/6/24: Pt's current LOF listed above. Continues with barriers to d/c of decreased strength throughout, decreased balance, impaired cognition including safety awareness, problem solving, attention, comprehension, expression, and short term memory, and decreased activity tolerance; all affect independence in self care and fxl transfers. Pt participating in ADL training, therapeutic exercises and activities, fxl mobility/transfers, cognitive training, and activity tolerance in order to progress towards goals. Pt would benefit from continued skilled OT services in order to address listed barriers and prepare for safe d/c.     3/13/24: Pt's current LOF listed above. Continues with barriers to d/c of decreased strength throughout, decreased balance, impaired cognition including safety awareness, problem solving, attention, comprehension, expression, and short term memory, and decreased activity tolerance; all affect independence in self care and fxl transfers. Pt participating in ADL training, therapeutic exercises and activities, fxl mobility/transfers, cognitive training, and activity tolerance in order to progress  towards goals. Pt would benefit from continued skilled OT services in order to address listed barriers and prepare for safe d/c.     3/20/24: Pt's current LOF listed above. Continues with barriers to d/c of decreased strength throughout, decreased balance, impaired cognition including safety awareness, problem solving, attention, comprehension, expression, and short term memory, and decreased activity tolerance; all affect independence in self care and fxl transfers. Pt participating in ADL training, therapeutic exercises and activities, fxl mobility/transfers, cognitive training, and activity tolerance in order to progress towards goals. Pt would benefit from continued skilled OT services in order to address listed barriers and prepare for safe d/c.     3/27/24: Pt's current LOF listed above. Continues with barriers to d/c of decreased strength throughout, decreased balance, impaired cognition including safety awareness, problem solving, attention, comprehension, expression, and short term memory, and decreased activity tolerance; all affect independence in self care and fxl transfers. Pt participating in ADL training, therapeutic exercises and activities, fxl mobility/transfers, cognitive training, and activity tolerance in order to progress towards goals. Pt would benefit from continued skilled OT services in order to address listed barriers and prepare for safe d/c.    Speech Therapy:  Mode of Communication: Non-verbal  Speech/Language: Expressive Aphasia  Cognition: Exceptions to WNL  Cognition: Decreased Memory, Decreased Executive Functions, Decreased Attention, Decreased Comprehension, Decreased Safety  Orientation: Person, Place (unintelligible response for time and situation.)  Swallowing: Exceptions to WNL  Swallowing: Oral Dysphagia, Pharyngeal Dysphagia, Aspiration Risk  Diet Recommendations: Level 1/Ambrosio Qiu Thick  Discharge Recommendations:  (home vs. SNF)  [place] Home with:: 24 Hour Supervision,  Family Support (continued speech therapy services)  2/21 per nursing patient coughing with thin liquids. Diet downgraded to nectar thick liquids, consult pending this date.     2/28     Cognitive Linguisitic Assessments   Cognitive Linquistic Quick Test (CLQT) BCAT 8/21- pt only able to participate via white board 2' hearing loss     Comprehension   Assist Devices Other  (cochlear implants)   Auditory    (white board for communication)   QI: Comprehension 2. Sometimes Understands: Understands only basic conversations or simple, direct phrases. Frequently requires cues to understand   Comprehension (FIM) 2 - Understands only simple expressions or gestures (waves, hello)   Expression   Non-Verbal Basic   QI: Expression 2. Frequently exhibits difficulty with expressing needs and ideas   Expression (FIM) 2 - Uses only simple expressions or gestures (waves, hello)   Social Interaction   Social Interaction (FIM) 2 - Interacts appropriately 25-49% of time   Problem Solving   Problem solving (FIM) 2 - Solves basic problems 25-49% of time   Memory   Memory (FIM) 2 - Recognizes, recalls/performs 25-49%   Memory Skills   Orientation Level Oriented to person   Consistencies Assessed and Performance   Materials Admnistered Puree/Level 1;Nectar thick liquid   Oral Stage Mild impaired   Phargngeal Stage Moderate impaired   Swallow Mechanics Mild delayed;Swallow initation;Weak larygneal rise;Aspiration risk   Strategies and Efficacy small bites, small sips, slow rate   Recommendations   Risk for Aspiration Present   Recommendations Consider oral diet   Diet Solid Recommendation Level 1 Dysphagia/pureed   Diet Liquid Recommendation Nectar thick liquid   Recommended Form of Meds Whole;With thick liquid   General Precautions Aspiration precautions;Feed only when alert;Minimize distractions;Upright as possible for all oral intake;Remain upright for 45 mins after meals   Compensatory Swallowing Strategies Alternate solids and liquids    Results Reviewed with PT/Family/Caregiver     Eating   Type of Assistance Needed Supervision  (visual cues)   Physical Assistance Level No physical assistance   Eating CARE Score 4       3/6 Continue puree and nectar thick liquids via 10CC cup and water via 5CC. Continue to abide by strict aspiration precautions. Plan to repeat VBS prior to discharge. Last VBS was 2/14/24.     3/13  Repeat VBS on 3/11/24 recommendations for pureed solids and nectar thick liquids. Overall concern for swallow decline from previous VBS on 2/14/24 with recommendations at that time for pureed solids and thin liquids. Sigificant pharyngeal residue with all consistencies is a concern for diet tolerance across meals with increased aspiration risk. Strict aspiration precautions, swallow strategies and direct supervision with meals continues.     UPDATED FIM LEVELS   Comprehension   Comprehension (FIM) 3 - Understands basic info/conversation 50-74% of time  (via written direction in short phrases, gesture or lip reading)   Expression   Expression (FIM) 3 - Expresses basic info/needs 50-74% of time  (wants/needs via verbal 1-2 word phrases or gesture.)   Social Interaction   Social Interaction (FIM) 4 - Interacts 75-89% of time  (increased social greetings and attention throughout session.)   Problem Solving   Problem solving (FIM) 2 - Solves basic problems 25-49% of time   Memory   Memory (FIM) 2 - Recognizes, recalls/performs 25-49%     3/20 Sigificant pharyngeal residue with all consistencies is a concern for diet tolerance across meals with increased aspiration risk. Strict aspiration precautions, swallow strategies and direct supervision with meals continues. He is allowed water only via 5CC provalve cup or single controlled sips via straw ( pinching and removing ) to control rate.   UPDATED FIM LEVELS     Comprehension   Comprehension (FIM) 4 - Understands basic info/conversation 75-90% of time   Expression   Expression (FIM) 3 -  Expresses basic info/needs 50-74% of time   Social Interaction   Social Interaction (FIM) 5 - Interacts appropriately with others 90% of time   Problem Solving   Problem solving (FIM) 3 - Solves basic problmes 50-74% of time   Memory   Memory (FIM) 3 - Recognizes, recalls/performs 50-74%   Memory Skills       3/27 continue same diet recs, pending discharge home with 24/7 supervision vs. SNF.     peech/Language/Cognition Assessmetn   Treatment Assessment attempted same low tech AAC trials as yesterday but with decreases field to F6. No initiation at first requirng max cues to get started with decreased cues to min.   Swallow Assessment   Swallow Treatment Assessment patient received OOB in chair with wife, son and son's significant other present. Patient USP through lunch tray. Inconsistent wet cough upon arrival. Trials of nectar thick liquid via 5 cc cup and via single controlled straw sips via pinching the straw and removing. Pt is not impulsive via straw. It is important to note that patient with increased delay in the initiation of the swallow with sometimes no initiation at all until visual/written cue up to 10 seconds. This is likely due to fatigue of the swallow. He would benefit from keeping meals no longer than 45 minutes. Family educated bedside.     UPDATED FIM LEVELS   Comprehension   Assist Devices Hearing Aid   Comprehension (FIM) 4 - Understands basic info/conversation 75-90% of time   Expression   Expression (FIM) 3 - Expresses basic info/needs 50-74% of time   Social Interaction   Social Interaction (FIM) 5 - Interacts appropriately with others 90% of time   Problem Solving   Problem solving (FIM) 3 - Solves basic problmes 50-74% of time   Memory   Memory (FIM) 3 - Recognizes, recalls/performs 50-74%       Nursing Notes:  Appetite: Good  Diet Type: Dysphagia I, Nectar thick liquids                      Diet Patient/Family Education Complete: Yes    Type of Wound (LDA):  (allevyn to sacrum and  heels preventative)                    Type of Wound Patient/Family Education: Yes  Bladder: Incontinent  Catheter Type: Brooks  Bladder Patient/Family Education: Yes  Bowel: Q 2 Hour Bowell and bladder program     Bowel Patient/Family Education: Yes  Pain Location/Orientation: Location: Generalized  Pain Score: 0                       Hospital Pain Intervention(s): Medication (See MAR)  Pain Patient/Family Education: Yes  Medication Management/Safety  Injectable: Lovenox  Safe Administration: Yes  Reason for non-safe administration: not attempted  Medication Patient/Family Education Complete: Yes    2/27/2024  Pt. Admit after ICH/falls. Pt. Is deaf. Communicates via white board and handouts. Does occasionally say one or two words to questions asked, answers are appropriate. Requires close supervision  when eating and requires special cups to drink with- on nectar thick fluids.  Follows commands. Pills crushed and in applesauce. Has failed urinary retention protocol and has a brooks catheter in place now. Has had small amount of bleeding at meatus and in bag. Reported to earlier shift that tip of penis was irritated. Has not reported this to myself. Mediplex to sacrum and heels as prevention.   3/4/24 Pt On nectar thick  fluids uses special cups. Supervision for all meals wife is cleared by speech to sit with PT during meals. Brooks back in place failed voiding trial.     3/12/2024 A/O x 2-3, pt. Is starting to be more verbal and clearer with words. Communication done with white board. Had VBS done on 3-11, now on Modified Dysphagia diet 1-pureed with nectar thick fluids, medications crushed and given in applesauce. Failed urinary retention trial again and now has brooks in. Developed Hypotensive episodes which improved after fluids, currently receiving NSS at 75ml/hr. Preventive allevny on buttocks and both heels. Scrotum is pink, cream applied. Meds adjusted for on-going thrush. 4 SR up for safety and bed/tab alarms  on.     3/18/2024  Uses white board for basic communication. A/O x 2-3. Dysphagia diet nectar thick fluids. Pt on antibiotics (Levaquin) x5 days. Preventative allevyn on heels and buttocks. Repo q 2hrs.     3/25/2024: Patient is A+Ox2-3. Patient utilizes white board for communication. Patient able to communicate needs with 2-word sentences at times. Dysphagia diet, nectar thick fluids, meds crushed in applesauce. Transfers with SV/close CG and ambulates to bathroom SV with RW. Patient can be impulsive, utilizing bed/chair alarms. Preventative allevyn on sacrum and bilateral heels. Finley removed 3/22. Patient continues with PVR checks.     Case Management:     Discharge Planning  Living Arrangements: Lives w/ Spouse/significant other  Support Systems: Spouse/significant other, Son, Daughter  Assistance Needed: unknown  Type of Current Residence: Private residence  Current Home Care Services: No  Initial assessment & orientation to [facility] with Pt & his wife, who was visiting bedside. Pt had difficulty hearing this worker, but his wife expressed that she is in agreement with tentative team recommendations. Tx team meeting was held today & tentative target DC date is 3/8, with home health services (all services). Discussed role of team members & reviewed TX & DC planning with Pt & Pt's spouse, who expressed understanding & agreement. Pt's additional family also visiting today & also present when physician assessed Pt.  SW will continue to monitor & assist as needed with TX & DC planning.     2/28/24 - Tx team recommendations reviewed with patient & family, who expressed understanding & agreement. Target DC date is 3/8 with [facility] (all); a list of providers was provided to Pt & a referral will be made based on Pt preference (expressed interest in [facility] & [facility]). Pt's spouse inquiring if there may be an extension; discussed that this will be reviewed at the team meeting next week. Provided information regarding meal options.  SW will continue to monitor & assist as needed with Tx & DC planning.    Is the patient actively participating in therapies? yes  List any modifications to the treatment plan: na    Barriers Interventions   Urinary incontinence at times Timed toileting, medical management and oversight   Dysphagia, aphasia ST strategies, puree with nectar thick liquids   Decreased cog, safety ST strategies, ADL/transfer/gait training   Decreased balance, end ADL, transfer, gait training   Decreased strength Therapeutic exercise, therapeutic activity     Is the patient making expected progress toward goals? yes  List any update or changes to goals: na    Medical Goals:     Weekly Team Goals:   Rehab Team Goals  ADL Team Goal: Patient will require supervision with ADLs with least restrictive device upon completion of rehab program  Transfer Team Goal: Patient will require assist with transfers with least restrictive device upon completion of rehab program  Locomotion Team Goal: Patient will require assist with locomotion with least restrictive device upon completion of rehab program  Cognitive Team Goal: Patient will return to premorbid level of cognitive activity upon completion of rehab program    Min/mod assist self care  S bed mobility, transfers, and mobility  Complete ongoing family training    Health and wellness: to be able to return home and complete activities with spouse    Discussion: Team has been completing family training, however it appears that spouse would like to explore alternate placement.  If patient is to go home, will continue with family training and completion of home eval    Anticipated Discharge Date:  when bed available

## 2024-03-27 NOTE — NUTRITION
"   03/27/24 1031   Biochemical Data,Medical Tests, and Procedures   Biochemical Data/Medical Tests/Procedures Lab values reviewed;Meds reviewed   Labs (Comment) 3/25/24 creat 0.58, albumin 3.1   Meds (Comment) vitamin B12, MVI, senna   Speech Therapy Recommendations (Comment) Continues to receive ST.   Nutrition-Focused Physical Exam   Nutrition-Focused Physical Exam Findings RN skin assessment reviewed;Edema;No skin issues documented  (trace BL LE edema)   Nutrition-Focused Physical Exam Findings Last bowel movement 3/24   Medical-Related Concerns PMH reviewed   Adequacy of Intake   Nutrition Modality PO   Feeding Route   PO Independent  (with supervision)   Current PO Intake   Current Diet Order Puree diet with double portions, NTL, extra sauce/gravy in a cup   Nutrition Supplements Mighty Shake;Magic Cup  (Mightyshake TID. Magic Cup BID.)   Current Meal Intake 0-25%;25-50%;50-75%;%   Intake Supplements 50-75%;%   Estimated calorie intake compared to estimated need Anticipate nutrient needs are not always met.   PES Statement   Problem Continue previous diagnosis   Recommendations/Interventions   Malnutrition/BMI Present Yes  (as previously documented)   Summary Nutrition follow up assessment. Noted significant 6# weight loss in 1 week (4.5% body weight). Malnutrition criteria met previously this admission. Continues to receive ST. Prescribed a Puree diet with double portions, NTL, extra sauce/gravy in a cup. Mightyshake TID. Magic Cup BID. Meal completions variable 0-100% since last nutrition follow up assessment. Anticipate nutrient needs are not always met. Met with patient at bedside. He is unable to participate meaningfully in conversation. Per nursing patient with \"sometimes very well\" meal completions. Nursing reports double portions are utilized PRN and patient's appetite has been improving. Nursing reports % completion both Mightyshake and Magic Cup. Recommend continuing nutrition " interventions in place, continue with encouragement at meals. RD continues following.   Interventions/Recommendations Continue current diet order;Supplement continue;Monitor I & O's   Education Assessment   Education Patient/caregiver not appropriate for education at this time   Patient Nutrition Goals   Goal Tolerate PO diet;Avoid weight loss;Meet PO needs

## 2024-03-27 NOTE — PROGRESS NOTES
03/27/24 1440   Pain Assessment   Pain Assessment Tool 0-10   Pain Score No Pain   Restrictions/Precautions   Precautions Aphasia;Bed/chair alarms;Aspiration;Cognitive;Fall Risk;Hard of hearing;Supervision on toilet/commode   Comprehension   Assist Devices Hearing Aid   Speech/Language/Cognition Assessmetn   Treatment Assessment [facility] AAC board F6 familiar pictures ID completed with max cues 5/6 trials and increased to 6/6 with only min-mod cue on the 6th trial   Swallow Assessment   Swallow Treatment Assessment Attempted straw bubble blowing today with water despite max cues patient unable to follow direction to complete this exercise to attend to direction for this exercise likely due to complexity. Trialed with tissue blowing to work towards increaseing respiration support/strength max cue initially with 15 minute delay. Pt with some carryover across 3 complete trials but unable to sustain airflow for longer than 2 seconds. Coughing with single sips of water via straw on every trial this date ( again demonstrating inconsistent tolerance throughout the day). Pt remains a very high risk for aspiration due to variable diet tolerance and severity of the swallow function.   SLP Therapy Minutes   SLP Time In 1440   SLP Time Out 1540   SLP Total Time (minutes) 60   SLP Mode of treatment - Individual (minutes) 60   SLP Mode of treatment - Concurrent (minutes) 0   SLP Mode of treatment - Group (minutes) 0   SLP Mode of treatment - Co-treat (minutes) 0   SLP Mode of Treatment - Total time(minutes) 60 minutes   SLP Cumulative Minutes 965

## 2024-03-27 NOTE — PROGRESS NOTES
03/27/24 1310   Pain Assessment   Pain Assessment Tool 0-10   Pain Score No Pain   Restrictions/Precautions   Precautions Aphasia;Aspiration;Bed/chair alarms;Cognitive;Fall Risk;Hard of hearing;Impulsive;Supervision on toilet/commode   Weight Bearing Restrictions No   ROM Restrictions No   Sit to Stand   Type of Assistance Needed Supervision   Physical Assistance Level No physical assistance   Sit to Stand CARE Score 4   Toileting Hygiene   Type of Assistance Needed Physical assistance   Physical Assistance Level 76% or more   Comment able to pull R side of clothing up over hips with physical cues   Toileting Hygiene CARE Score 2   Toileting   Able to Pull Clothing   (R side only up)   Manage Hygiene   (did not void once on toilet)   Limitations Noted In Balance;Problem Solving;ROM;Safety;Sequencing;LE Strength   Adaptive Equipment Grab Bar   Toilet Transfer   Type of Assistance Needed Supervision   Physical Assistance Level No physical assistance   Toilet Transfer CARE Score 4   Toilet Transfer   Surface Assessed Raised Toilet   Transfer Technique Standard   Limitations Noted In Balance;Endurance;Problem Solving;Safety;Sequencing;LE Strength   Adaptive Equipment Grab Bar;Walker   Findings visual and physical cues for safety as pt attempted to sit prior to safe proximity to toilet and also prior to when clothing was pulled down   Exercise Tools   Theraputty searched for and retrieved small objects in yellow theraputty, OT isolated objects in putty approx 50% of the time with pt I'ly finding other 50% of objects   Other Exercise Tool 1 card match using R hand to place cards in matching pockets, fatigue noted near end of activity with support under pt's elbow required to obtain full R shld flexion to reach highest pockets   Other Exercise Tool 2 cognitive book matching color words, shape words, and words with first letter of word; pt completed with approx 75% accuracy and required visual cues to correct  errors,gripped pieces of book with R hand pinch    Other Exercise Tool 3 3x10 pronation/supination and internal/external rotation using yellow flexbar   Cognition   Overall Cognitive Status Impaired   Arousal/Participation Alert;Responsive;Cooperative   Attention Attends with cues to redirect   Orientation Level Oriented to person;Oriented to place   Memory Decreased short term memory;Decreased recall of recent events;Decreased recall of precautions   Following Commands Follows one step commands with increased time or repetition   Assessment   Treatment Assessment Pt agreeable to OT session this PM. Received sitting in recliner. Pt completed ROM/strength, cognitive, fine motor, and activity tolerance activities; details in respective sections. Pt continues with impaired sequencing, attention, and problem solving that impacts completion of activities, however all areas improving. Fxl mobility to and from bathroom with RW and supervision completed; toileting completed with maxA with pt not voiding once on toilet. Pt's wife present during session and provided good encouragement and support. Pt's barriers to d/c include decreased strength throughout, decreased balance, impaired hearing, impaired cognition including safety awareness, problem solving, attention, comprehension, and expression, and decreased activity tolerance; all affect independence in self care and fxl transfers. Pt would benefit from continued skilled OT services in order to address listed barriers and prepare for safe d/c.   Prognosis Fair   Problem List Decreased strength;Decreased range of motion;Decreased endurance;Impaired balance;Decreased coordination;Decreased cognition;Decreased safety awareness;Impaired hearing   Plan   Treatment/Interventions ADL retraining;Functional transfer training;LE strengthening/ROM;Therapeutic exercise;Endurance training;Cognitive reorientation;Patient/family training;Equipment eval/education;Compensatory  technique education;OT   Progress Progressing toward goals   OT Therapy Minutes   OT Time In 1310   OT Time Out 1440   OT Total Time (minutes) 90   OT Mode of treatment - Individual (minutes) 90

## 2024-03-27 NOTE — PLAN OF CARE
Problem: PAIN - ADULT  Goal: Verbalizes/displays adequate comfort level or baseline comfort level  Description: Interventions:  - Encourage patient to monitor pain and request assistance  - Assess pain using appropriate pain scale  - Administer analgesics based on type and severity of pain and evaluate response  - Implement non-pharmacological measures as appropriate and evaluate response  - Consider cultural and social influences on pain and pain management  - Notify physician/advanced practitioner if interventions unsuccessful or patient reports new pain  Outcome: Progressing     Problem: INFECTION - ADULT  Goal: Absence or prevention of progression during hospitalization  Description: INTERVENTIONS:  - Assess and monitor for signs and symptoms of infection  - Monitor lab/diagnostic results  - Monitor all insertion sites, i.e. indwelling lines, tubes, and drains  - Monitor endotracheal if appropriate and nasal secretions for changes in amount and color  - [place] appropriate cooling/warming therapies per order  - Administer medications as ordered  - Instruct and encourage patient and family to use good hand hygiene technique  - Identify and instruct in appropriate isolation precautions for identified infection/condition  Outcome: Progressing     Problem: SAFETY ADULT  Goal: Patient will remain free of falls  Description: INTERVENTIONS:  - Educate patient/family on patient safety including physical limitations  - Instruct patient to call for assistance with activity   - Consult OT/PT to assist with strengthening/mobility   - Keep Call bell within reach  - Keep bed low and locked with side rails adjusted as appropriate  - Keep care items and personal belongings within reach  - Initiate and maintain comfort rounds  - Make Fall Risk Sign visible to staff  - Offer Toileting every 2 Hours, in advance of need  - Initiate/Maintain bed/chair alarm  - Apply yellow socks and bracelet for high fall risk patients  -  Consider moving patient to room near nurses station  Outcome: Progressing     Problem: Prexisting or High Potential for Compromised Skin Integrity  Goal: Skin integrity is maintained or improved  Description: INTERVENTIONS:  - Identify patients at risk for skin breakdown  - Assess and monitor skin integrity  - Assess and monitor nutrition and hydration status  - Monitor labs   - Assess for incontinence   - Turn and reposition patient  - Assist with mobility/ambulation  - Relieve pressure over bony prominences  - Avoid friction and shearing  - Provide appropriate hygiene as needed including keeping skin clean and dry  - Evaluate need for skin moisturizer/barrier cream  - Collaborate with interdisciplinary team   - Patient/family teaching  - Consider wound care consult   Outcome: Progressing     Problem: Nutrition/Hydration-ADULT  Goal: Nutrient/Hydration intake appropriate for improving, restoring or maintaining nutritional needs  Description: Monitor and assess patient's nutrition/hydration status for malnutrition. Collaborate with interdisciplinary team and initiate plan and interventions as ordered.  Monitor patient's weight and dietary intake as ordered or per policy. Utilize nutrition screening tool and intervene as necessary. Determine patient's food preferences and provide high-protein, high-caloric foods as appropriate.     INTERVENTIONS:  - Monitor oral intake, urinary output, labs, and treatment plans  - Assess nutrition and hydration status and recommend course of action  - Evaluate amount of meals eaten  - Assist patient with eating if necessary   - Allow adequate time for meals  - Recommend/ encourage appropriate diets, oral nutritional supplements, and vitamin/mineral supplements  - Order, calculate, and assess calorie counts as needed  - Recommend, monitor, and adjust tube feedings and TPN/PPN based on assessed needs  - Assess need for intravenous fluids  - Provide specific nutrition/hydration  education as appropriate  - Include patient/family/caregiver in decisions related to nutrition  Outcome: Progressing

## 2024-03-27 NOTE — PROGRESS NOTES
PM&R PROGRESS NOTE:  Thomas Chase 83 y.o. male MRN: [id number]  Unit/Bed#: -02 Encounter: [id number]        **CHANGE IN REHAB DIAGNOSIS FROM PRE-ADMISSION SCREEN  Rehab Diagnosis: Impairment of mobility, safety, Activities of Daily Living (ADLs), and cognitive/communication skills due to Brain Dysfunction:  02.22  Traumatic, Closed Injury  Etiologic Diagnosis: L SDH, R SAH, L anterior hypodensity lateral temporal lobe    HPI: Thomas Chase is a 83 y.o. male with past medical history significant for previous fall with resultant traumatic brain injury-subdural hematoma in 2022 status post left craniotomy, chronic aphasia, hard of hearing with cochlear implant in place, hyperlipidemia who presented to the [facility] [address] on 2/13/2024 with increased weakness and mental status change for several days.  Unclear if patient had head trauma.  CT head showing a 3 mm left frontal subdural hemorrhage.  CT lumbar spine showing DJD.  Patient found to have dysphagia and was seen by speech therapy.  VFSS on 2/15/2024 cleared him for a puréed diet with thin liquids.  Patient was also started by psychiatry for depression and started on Zoloft 25 mg daily.  Patient sustained a rapid response and a unwitnessed fall with head trauma notable bump on head on 2/15/2024.  CT head showing a new small volume acute subarachnoid hemorrhage in the left frontal opercular region as well as new nondisplaced fracture of the zygomatic arch with soft tissue contusion, stable 3 mm subdural hemorrhage seen previously.  Patient transferred to [facility] for further evaluation.  Patient seen by neurology and neurosurgery.  CTA head and neck showing a left anterior hypodensity in the left anterior lateral temporal lobe with 2 punctate hyperdensities.  These represented possible nonhemorrhagic contusions or venous infarcts.  Less likely to represent ischemia or neoplasm.  Follow-up imaging recommended with CT head in 2-3 weeks.  CT  venogram showing patent dural venous sinuses.  Patient was cleared for DVT prophylaxis and started on Keppra for seizure prophylaxis.    Medical course complicated by suspected UTI based on UA and symptoms.  Patient was treated with antibiotics x 3 days.  After medical stabilization, patient found to have acute functional deficits from his baseline in mobility, self-care, speech swallow cognition therefore admitted to [facility] for acute inpatient rehabilitation.    SUBJECTIVE: Patient seen face-to-face today in PT doing NuStep.  No acute issues reported by nursing or staff.  Patient's wife was here yesterday and has been doing family training however does not feel comfortable taking him home - case management to follow-up finding possible SNF      ASSESSMENT: Stable, progressing      PLAN:    Rehabilitation  Functional deficits: Aphasia, dysphagia, impaired cognition, impaired balance, impulsivity, poor safety awareness impaired mobility, self care    Continue current rehabilitation plan of care to maximize function.  I personally attended, reviewed, and discussed medical and functional updates in team conference today.  Please refer to advance care planning note for details.  Expected Discharge: Possible home discharge with hired assistance vs SNF  Family training in progress    Current Function:  Physical Therapy Occupational Therapy Speech Therapy   Weight Bearing Status: Weight Bearing as Tolerated  Transfers: Incidental Touching, Supervision  Bed Mobility: Incidental Touching, Supervision  Amulation Distance (ft): 206 feet  Ambulation: Incidental Touching  Assistive Device for Ambulation: Roller Walker  Wheelchair Mobility Distance: 133 ft  Wheelchair Mobility: Maximum Assistance (max visual and tactile cues)  Number of Stairs: 14  Assistive Device for Stairs: Bilateral Hand Rails  Stair Assistance: Incidental Touching  Ramp: Incidental Touching  Assistive Device for Ramp: Roller Walker  Discharge  Recommendations: Home with:  DC Home with:: First Floor Setup, Home Physical Therapy   Eating: Supervision  Grooming: Supervision  Bathing: Minimal Assistance  Bathing: Minimal Assistance  Upper Body Dressing: Minimal Assistance  Lower Body Dressing: Maximum Assistance  Toileting: Maximum Assistance  Tub/Shower Transfer: Incidental Touching  Toilet Transfer: Supervision  Cognition: Exceptions to WNL  Cognition: Decreased Executive Functions, Decreased Attention, Decreased Comprehension, Decreased Safety  Orientation: Person, Place, Time   Mode of Communication: Non-verbal  Speech/Language: Expressive Aphasia  Cognition: Exceptions to WNL  Cognition: Decreased Memory, Decreased Executive Functions, Decreased Attention, Decreased Comprehension, Decreased Safety  Orientation: Person, Place (unintelligible response for time and situation.)  Swallowing: Exceptions to WNL  Swallowing: Oral Dysphagia, Pharyngeal Dysphagia, Aspiration Risk  Diet Recommendations: Level 1/Ambrosio Qiu Thick  Discharge Recommendations:  (home vs. SNF)  DC Home with:: 24 Hour Supervision, Family Support (continued speech therapy services)       DVT prophylaxis  Continue Lovenox      * Intracranial hemorrhage (HCC)-resolved as of 3/21/2024  Assessment & Plan  Initial presentation on 2/13/2024 with increased weakness in the legs, mental status changes for several days  CT head showing a 3 mm left frontal subdural hemorrhage  Rapid response 2/15/2024 with fall and head trauma  CT head showing a new small volume acute subarachnoid hemorrhage in the left frontal opercular region and new nondisplaced fracture of the zygomatic arch with soft tissue contusion.    Transferred to [facility]  CTA head and neck and and repeat CT head showing a left anterior hypodensity in the left anterior lateral temporal lobe with 2 punctate hyperdensities.  Differential diagnosis nonhemorrhagic contusions or venous infarcts.  Patient seen by neurology and  neurosurgery  Conservative management  Cleared for DVT prophylaxis  Started on Keppra for seizure prophylaxis  Repeat CT head scheduled on Monday, 3/4/2024 -personally reviewed  Radiology read as follows:  Improving left anterior temporal lobe contusions with resolving edema. Stable subacute subdural hemorrhage along the anterior left frontal convexity. Resolved subarachnoid and intraventricular hemorrhages. Unchanged minimal hyperdensity beneath the left craniotomy flap. Stable right parafalcine subdural hygroma. There is no new intra or extra-axial hemorrhage.  Follow-up with neurosurgery on 3/6/2024 virtually completed.  Recommendations as follows: Neurosurgery will sign off, patient will follow-up in 2 weeks with repeat CT head   CTH 3/16/24: No acute intracranial abnormality.Stable thin chronic left frontal subdural collection stable since 11/10/2023. Evaluation of the left temporal lobe is limited due to artifact from the cochlear implant but there may be mild developing encephalomalacia related to prior contusion.  Follow-up with neurosurgery and neurology in outpatient clinic (appointment is 3/21/24 with neurology in 3/22/2024 with neurosurgery)  Follow-up with Neurology in 3 months  Follow-up with Neurosurgery PRN    Behavior safety risk  Assessment & Plan  Patient demonstrating mild impulsivity in acute care  Fall x 2 in acute care  Falls at home in the past    Safety behavior plan going well and ARC.  No further impulsivity  Nursing and staff to provide close supervision near nursing station  Patient placed on 4 side rails (not as a restraint, patient and wife preference)  Monitor sleep-wake cycle  Avoid overstimulation: 1 visitor at a time, low lights, low sounds      Insomnia  Assessment & Plan  Continue melatonin to 9 mg QHS    Hematuria  Assessment & Plan  Started 3/2 - 3/3/2024  Minor, and more likely related to irritation from Finley catheter  Irrigation as needed per urology  Asymptomatic  H&H  stable    Headache  Assessment & Plan  Resolved  Continue Tylenol 650 mg PRN     Severe protein-calorie malnutrition (HCC)  Assessment & Plan  BMI 18.06  Nutrition following  Optimize oral intake  Supplements ordered  He is eating % of meals  Hydration is variable  Maintain IV  IVF PRN  SLP working towards free water protocol    Dysphagia  Assessment & Plan  VFSS completed 2/15/2024  Cleared for a puréed diet with nectar thick liquids  SLP to follow while at [facility]  Repeat VFSS 3/11/24: Mild oral dysphagia, Moderate oral-pharyngeal dysphagia  Recommendations:  Diet: Dysphagia 1 pureed   Liquids: Nectar   Patient doing very well with oral food eating % of all of his meals.  Also gaining weight.  At times has difficulty with fluid intake due to nectar thick liquids and dysphagia.  Repeat swallow study recommended in 3-4 weeks  Supplement with IV fluids if needed  SLP also working towards a free water nectar thick protocol with oral care prior  Possible future consideration for PEG       Low BP  Assessment & Plan  Resolved  Continue midodrine 2.5 mg TID (3/18/24) > Increased to 5 mg TID (3/21/24)    BP lower since 3/10/2024  Compression stockings and abdominal binder ordered  Internal medicine ordering IV fluid bolus.  I continued IVF x 1 L which was completed  BP continues to be very low especially in standing.   Labs reviewed - nothing to explain low BP        Urinary retention  Assessment & Plan  Finley catheter removed on [facility]  Trial of void underway  Mixed pattern  Unfortunately required multiple straight catheterizations  Finley catheter replaced 3/1/24 per urology  Flomax reduced to 0.4 mg daily due to hypotension   Trial of void 3/7/2024 - failed this  Finley re-inserted 3/8/24  Patient likely to require longer time prior to removal of Finley catheter -recommend removal in 2 weeks around 3/22/2024  Finley removed and Trial of void started 3/22/2024  Slow in the beginning however after good BM patient was  starting to void more with lower PVRs.  Having both continent and incontinent episodes -mixed pattern  Timed toilet frequently      Constipation due to neurogenic bowel  Assessment & Plan  Patient started on more aggressive bowel program yesterday  BM 3/24/24    Hearing loss  Assessment & Plan  Chronic hearing loss  Patient status post cochlear implant  MRI contraindicated    Hypercholesterolemia  Assessment & Plan  Continue simvastatin 20 mg daily (home dose)    UTI (urinary tract infection)-resolved as of 2/21/2024  Assessment & Plan  Suspected UTI based on UA and symptoms in acute care  Completed 3 days IV antibiotics  In ARC:  UA was ordered by IM : equivocal - neg.   Culture was done by internal medicine.  Enterococcus faecalis  Likely colonized.  Asymptomatic.  Over the weekend, however, Dr. Paz did decide to initiate treatment with Levaquin x 5 days.          Appreciate IM consultants medical co-management.  Labs, medications, and imaging personally reviewed.      ROS:  A ten point review of systems was completed on 03/27/24 and pertinent positives are listed in subjective section. All other systems reviewed were negative.       OBJECTIVE:   BP 90/55 (BP Location: Left arm)   Pulse 75   Temp 97.5 °F (36.4 °C) (Temporal)   Resp 16   Ht 6' (1.829 m)   Wt 57.8 kg (127 lb 6.8 oz)   SpO2 93%   BMI 17.28 kg/m²     Physical Exam  Vitals and nursing note reviewed.   Constitutional:       General: He is not in acute distress.     Appearance: He is well-developed.   HENT:      Head: Normocephalic and atraumatic.      Nose: Nose normal.      Mouth/Throat:      Mouth: Mucous membranes are moist.   Eyes:      Conjunctiva/sclera: Conjunctivae normal.   Cardiovascular:      Rate and Rhythm: Normal rate and regular rhythm.      Pulses: Normal pulses.   Pulmonary:      Effort: Pulmonary effort is normal.      Breath sounds: Normal breath sounds. No wheezing or rales.   Abdominal:      General: Bowel sounds are  normal. There is no distension.      Palpations: Abdomen is soft.      Tenderness: There is no abdominal tenderness.   Musculoskeletal:         General: No swelling.      Cervical back: Neck supple.   Skin:     General: Skin is warm.   Neurological:      Mental Status: He is alert and oriented to person, place, and time.   Psychiatric:         Mood and Affect: Mood normal.          Lab Results   Component Value Date    WBC 5.63 03/25/2024    HGB 11.7 (L) 03/25/2024    HCT 37.0 03/25/2024    MCV 98 03/25/2024     (H) 03/25/2024     Lab Results   Component Value Date    SODIUM 139 03/25/2024    K 4.2 03/25/2024     03/25/2024    CO2 27 03/25/2024    BUN 16 03/25/2024    CREATININE 0.58 (L) 03/25/2024    GLUC 82 03/25/2024    CALCIUM 8.8 03/25/2024     Lab Results   Component Value Date    INR 1.02 06/02/2022    INR 1.10 05/29/2022    INR 1.04 05/28/2022    PROTIME 13.0 06/02/2022    PROTIME 13.7 05/29/2022    PROTIME 13.2 05/28/2022           Current Facility-Administered Medications:     acetaminophen (TYLENOL) tablet 650 mg, 650 mg, Oral, Q6H PRN, Daniel Clayton MD, 650 mg at 03/24/24 2054    bisacodyl (DULCOLAX) rectal suppository 10 mg, 10 mg, Rectal, Daily PRN, Darwin Paz MD, 10 mg at 03/22/24 1327    cyanocobalamin (VITAMIN B-12) tablet 500 mcg, 500 mcg, Oral, Daily, Darwin Paz MD, 500 mcg at 03/27/24 0937    enoxaparin (LOVENOX) subcutaneous injection 40 mg, 40 mg, Subcutaneous, Q24H JEANNIE, Alexandrea Lugo MD, 40 mg at 03/27/24 0937    lactulose (CHRONULAC) oral solution 20 g, 20 g, Oral, BID PRN, Darwin Paz MD, 20 g at 03/22/24 1155    loratadine (CLARITIN) tablet 10 mg, 10 mg, Oral, Daily, Amada Eppesron PA-C, 10 mg at 03/27/24 0937    meclizine (ANTIVERT) tablet 25 mg, 25 mg, Oral, Q8H PRN, Amada Epperson PA-C    melatonin tablet 9 mg, 9 mg, Oral, HS, Alexandrea Lugo MD, 9 mg at 03/26/24 2106    midodrine (PROAMATINE) tablet 5 mg, 5 mg, Oral, TID AC, Amada L Dagnall,  PA-C, 5 mg at 03/27/24 0937    multivitamin-minerals (CENTRUM) tablet 1 tablet, 1 tablet, Oral, Daily, Amada LEE Dagnall, PA-C, 1 tablet at 03/27/24 0937    polyethylene glycol (MIRALAX) packet 17 g, 17 g, Oral, Daily PRN, Ashley Depadua, MD, 17 g at 03/21/24 1655    pravastatin (PRAVACHOL) tablet 40 mg, 40 mg, Oral, Daily With Dinner, Amada L Dagnall, PA-C, 40 mg at 03/26/24 1612    senna (SENOKOT) tablet 8.6 mg, 1 tablet, Oral, HS, Alexandrea Lugo MD, 8.6 mg at 03/26/24 2106    sertraline (ZOLOFT) tablet 25 mg, 25 mg, Oral, Daily, Amada L Dagnall, PA-C, 25 mg at 03/27/24 0937    tamsulosin (FLOMAX) capsule 0.4 mg, 0.4 mg, Oral, Daily With Dinner, Darwin Paz MD, 0.4 mg at 03/26/24 1613    Past Medical History:   Diagnosis Date    Arthritis     Encounter for general adult medical examination without abnormal findings 03/20/2019    Fall 11/03/2022    Hyperlipidemia     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    Macular degeneration, wet (HCC)     Urinary retention 06/02/2022       Patient Active Problem List    Diagnosis Date Noted    Behavior safety risk 02/20/2024    Insomnia 03/21/2024    Abnormal CT of the head 03/21/2024    Hematuria 03/04/2024    Headache 02/20/2024    Bilateral lower extremity weakness 02/17/2024    Abnormal CT scan, lumbar spine 02/15/2024    Severe protein-calorie malnutrition (HCC) 02/14/2024    Debility 02/13/2024    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    Dysphagia 11/21/2023    Macular degeneration, age related 02/27/2023    H/O traumatic subdural hematoma 02/27/2023    Sensorineural hearing loss (SNHL), bilateral 08/18/2022    Balance disorder 06/28/2022    Abnormal echocardiogram 06/27/2022    Right bundle-branch block 06/27/2022    Low BP 06/19/2022    Diastolic dysfunction 06/19/2022    EKG abnormalities 06/19/2022    Constipation due to neurogenic bowel 06/02/2022    Urinary retention 06/02/2022    SDH (subdural hematoma) (HCC) 05/27/2022    Primary osteoarthritis of left  knee 05/17/2022    Hygroma 04/26/2022    Right hand pain     Carpal tunnel syndrome on right     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      ** Please Note:  voice to text software may have been used in the creation of this document. Although proof errors in transcription or interpretation are a potential of such software**

## 2024-03-27 NOTE — PROGRESS NOTES
03/26/24 1230   Pain Assessment   Pain Assessment Tool 0-10   Pain Score No Pain   Pain Rating: FLACC (Rest) - Face 0   Pain Rating: FLACC (Rest) - Legs 0   Pain Rating: FLACC (Rest) - Activity 0   Pain Rating: FLACC (Rest) - Cry 0   Pain Rating: FLACC (Rest) - Consolability 0   Score: FLACC (Rest) 0   Speech/Language/Cognition Assessmetn   Treatment Assessment attempted same low tech AAC trials as yesterday but with decreases field to F6. No initiation at first requirng max cues to get started with decreased cues to min.   Swallow Assessment   Swallow Treatment Assessment patient received OOB in chair with wife, son and son's significant other present. Patient FCI through lunch tray. Inconsistent wet cough upon arrival. Trials of nectar thick liquid via 5 cc cup and via single controlled straw sips via pinching the straw and removing. Pt is not impulsive via straw. It is important to note that patient with increased delay in the initiation of the swallow with sometimes no initiation at all until visual/written cue up to 10 seconds. This is likely due to fatigue of the swallow. He would benefit from keeping meals no longer than 45 minutes. Family educated bedside.   SLP Therapy Minutes   SLP Time In 1230   SLP Time Out 1300   SLP Total Time (minutes) 30   SLP Mode of treatment - Individual (minutes) 30   SLP Mode of treatment - Concurrent (minutes) 0   SLP Mode of treatment - Group (minutes) 0   SLP Mode of treatment - Co-treat (minutes) 0   SLP Mode of Treatment - Total time(minutes) 30 minutes   SLP Cumulative Minutes 995

## 2024-03-27 NOTE — PROGRESS NOTES
03/27/24 0859   Pain Assessment   Pain Assessment Tool 0-10   Pain Score No Pain   Restrictions/Precautions   Precautions Aphasia;Aspiration;Bed/chair alarms;Cognitive;Fall Risk;Hard of hearing;Impulsive;Supervision on toilet/commode   Cognition   Overall Cognitive Status Impaired   Arousal/Participation Alert;Responsive;Cooperative   Attention Attends with cues to redirect   Orientation Level Oriented to person;Oriented to place   Memory Decreased short term memory;Decreased recall of recent events;Decreased recall of precautions   Following Commands Follows one step commands with increased time or repetition   Sit to Lying   Type of Assistance Needed Supervision   Comment visual/tactile cues   Sit to Lying CARE Score 4   Sit to Stand   Type of Assistance Needed Incidental touching;Supervision   Comment cg/close S; visual/tactile cues for sequence/technique/hand placement   Sit to Stand CARE Score 4   Bed-Chair Transfer   Type of Assistance Needed Incidental touching;Supervision   Comment cg/close S; visual/tactile cues for sequence/technique/hand placement   Chair/Bed-to-Chair Transfer CARE Score 4   Walk 10 Feet   Type of Assistance Needed Incidental touching   Comment cg level and unlevel surfaces with RW; increased visual/tactile cues for sequence/direction   Walk 10 Feet CARE Score 4   Walk 50 Feet with Two Turns   Type of Assistance Needed Incidental touching   Comment cg level and unlevel surfaces with RW; increased visual/tactile cues for sequence/direction   Walk 50 Feet with Two Turns CARE Score 4   Walk 150 Feet   Type of Assistance Needed Incidental touching   Comment cg level and unlevel surfaces with RW; increased visual/tactile cues for sequence/direction   Walk 150 Feet CARE Score 4   Walking 10 Feet on Uneven Surfaces   Type of Assistance Needed Incidental touching   Comment cg level and unlevel surfaces with RW; increased visual/tactile cues for sequence/direction   Walking 10 Feet on Uneven  Surfaces CARE Score 4   Ambulation   Primary Mode of Locomotion Prior to Admission Walk   Distance Walked (feet) 192 ft  (192' 171' 22' x 2 ( around PT gym) 157' 18' (out of bathroom))   Assist Device Roller Walker   Gait Pattern Inconsistant Maria Victoria;Slow Maria Victoria;Decreased foot clearance;Forward Flexion;Narrow ILIANA   Limitations Noted In Balance;Device Management;Endurance;Heel Strike;Posture;Safety;Speed;Strength   Provided Assistance with: Balance;Direction   Walk Assist Level Contact Guard   Findings cg level and unlevel surfaces with RW; increased visual/tactile cues for sequence/direction   Does the patient walk? 2. Yes   Curb or Single Stair   Style negotiated Single stair   Type of Assistance Needed Incidental touching;Supervision   Comment cg/close S using 2 handrails; visual/tactile cues for sequence/direction   1 Step (Curb) CARE Score 4   4 Steps   Type of Assistance Needed Incidental touching;Supervision   Comment cg/close S using 2 handrails; visual/tactile cues for sequence/direction   4 Steps CARE Score 4   12 Steps   Type of Assistance Needed Incidental touching;Supervision   Comment cg/close S using 2 handrails; visual/tactile cues for sequence/direction   12 Steps CARE Score 4   Stairs   Type Stairs;Ramp   # of Steps 14   Weight Bearing Precautions WBAT   Assist Devices Roller Walker;Bilateral Rail   Findings cg/close S using 2 handrails; visual/tactile cues for sequence/direction; cg/close S ramp with RW   Toilet Transfer   Type of Assistance Needed Incidental touching   Comment cg rw/grab bar; increased visual/tactile cues for sequence/technique   Toilet Transfer CARE Score 4   Therapeutic Interventions   Strengthening supine ther ex   Balance gait and transfer training   Other ramp and stair negotiation   Assessment   Treatment Assessment Patient agreeable to therapy session.   No pain reported.   Continues to require increased visual/tactile cues for sequence/technique/direction/hand placement  for all tasks.   Completed ther ex for general LE strengthening; gait and transfer training focusing on sequence and technique for improved balance and safety with functional mobility using walker.  Negotiated ramp cg/close S with RW; steps with 2 handrails cg/close S with cues for sequence.   Toileted patient during therapy session.  Patient incontinent in brief and DNV.  Returned to bed with call bell and all needs within reach.   PT Barriers   Physical Impairment Decreased strength;Decreased range of motion;Decreased endurance;Impaired balance;Decreased mobility;Decreased cognition;Impaired judgement;Decreased safety awareness;Impaired hearing   Functional Limitation Wheelchair management;Walking;Transfers;Standing;Stair negotiation;Ramp negotiation;Car transfers   Plan   Treatment/Interventions Functional transfer training;LE strengthening/ROM;Elevations;Therapeutic exercise;Bed mobility;Gait training   Progress Improving as expected   PT Therapy Minutes   PT Time In 0859   PT Time Out 1030   PT Total Time (minutes) 91   PT Mode of treatment - Individual (minutes) 91   PT Mode of treatment - Concurrent (minutes) 0   PT Mode of treatment - Group (minutes) 0   PT Mode of treatment - Co-treat (minutes) 0   PT Mode of Treatment - Total time(minutes) 91 minutes   PT Cumulative Minutes 2356   Therapy Time missed   Time missed? No

## 2024-03-28 PROCEDURE — 97530 THERAPEUTIC ACTIVITIES: CPT

## 2024-03-28 PROCEDURE — 97110 THERAPEUTIC EXERCISES: CPT

## 2024-03-28 PROCEDURE — 97535 SELF CARE MNGMENT TRAINING: CPT

## 2024-03-28 PROCEDURE — 97112 NEUROMUSCULAR REEDUCATION: CPT

## 2024-03-28 PROCEDURE — 99232 SBSQ HOSP IP/OBS MODERATE 35: CPT | Performed by: PHYSICAL MEDICINE & REHABILITATION

## 2024-03-28 PROCEDURE — 97116 GAIT TRAINING THERAPY: CPT

## 2024-03-28 RX ORDER — BISACODYL 10 MG
10 SUPPOSITORY, RECTAL RECTAL ONCE
Status: COMPLETED | OUTPATIENT
Start: 2024-03-28 | End: 2024-03-28

## 2024-03-28 RX ORDER — LACTULOSE 10 G/15ML
20 SOLUTION ORAL ONCE
Status: DISCONTINUED | OUTPATIENT
Start: 2024-03-28 | End: 2024-04-04

## 2024-03-28 RX ADMIN — MIDODRINE HYDROCHLORIDE 5 MG: 5 TABLET ORAL at 07:46

## 2024-03-28 RX ADMIN — SERTRALINE HYDROCHLORIDE 25 MG: 25 TABLET ORAL at 09:32

## 2024-03-28 RX ADMIN — Medication 1 TABLET: at 09:32

## 2024-03-28 RX ADMIN — SENNOSIDES 8.6 MG: 8.6 TABLET, FILM COATED ORAL at 21:19

## 2024-03-28 RX ADMIN — LACTULOSE 20 G: 20 SOLUTION ORAL at 11:31

## 2024-03-28 RX ADMIN — Medication 9 MG: at 21:19

## 2024-03-28 RX ADMIN — ACETAMINOPHEN 650 MG: 325 TABLET ORAL at 21:19

## 2024-03-28 RX ADMIN — LORATADINE 10 MG: 10 TABLET ORAL at 09:32

## 2024-03-28 RX ADMIN — CYANOCOBALAMIN TAB 500 MCG 500 MCG: 500 TAB at 09:32

## 2024-03-28 RX ADMIN — MIDODRINE HYDROCHLORIDE 5 MG: 5 TABLET ORAL at 12:18

## 2024-03-28 RX ADMIN — MIDODRINE HYDROCHLORIDE 5 MG: 5 TABLET ORAL at 16:43

## 2024-03-28 RX ADMIN — TAMSULOSIN HYDROCHLORIDE 0.4 MG: 0.4 CAPSULE ORAL at 17:16

## 2024-03-28 RX ADMIN — ENOXAPARIN SODIUM 40 MG: 40 INJECTION SUBCUTANEOUS at 09:32

## 2024-03-28 RX ADMIN — PRAVASTATIN SODIUM 40 MG: 40 TABLET ORAL at 17:16

## 2024-03-28 RX ADMIN — BISACODYL 10 MG: 10 SUPPOSITORY RECTAL at 17:16

## 2024-03-28 NOTE — NURSING NOTE
Nurse orientee with patient from 7-3. Assessment completed by her. Patient voices no complaints of pain. Transfers assist x 1 with walker. Needs direction and cueing for all tasks. Continues with supervised feed. Moist non productive cough noted with all meals. Slight increase in cough at dinner meal. Voiding small amounts thoughout shift. Bladder scan completed in AM. Per Dr. Lugo can stop with bladder scans at this time. Fluids encouraged. Unable to have a BM x 4 days. Lactulose and suppository given. Alarms in place. Will continue to monitor and follow plan of care.

## 2024-03-28 NOTE — PROGRESS NOTES
Progress Note - Thomas Chase 83 y.o. male MRN: [id number]    Unit/Bed#: [facility] 213-02 Encounter: [id number]      Assessment:  Urinary retention, BPH. Passed most recent TOV. Last PVR 2y79 cc.    Plan:  Continue tamsulosin. Continue TOV and follow urinary retention protocol. Discussed with nursing as well as the patient's wife.  Will follow as inpatient and outpatient    Subjective:   Voiding well since brooks removal without complaint    Objective:     Vitals: Blood pressure 96/72, pulse 91, temperature (!) 97.3 °F (36.3 °C), temperature source Temporal, resp. rate 17, height 6' (1.829 m), weight 57.8 kg (127 lb 6.8 oz), SpO2 96%.,Body mass index is 17.28 kg/m².      Intake/Output Summary (Last 24 hours) at 3/28/2024 1600  Last data filed at 3/28/2024 1342  Gross per 24 hour   Intake 100 ml   Output 250 ml   Net -150 ml       Physical Exam: no abdominal or flank tenderness     Invasive Devices       None                   Lab, Imaging and other studies: I have personally reviewed pertinent reports.    VTE Pharmacologic Prophylaxis: Sequential compression device (Venodyne)   VTE Mechanical Prophylaxis: sequential compression device

## 2024-03-28 NOTE — PROGRESS NOTES
03/28/24 1300   Pain Assessment   Pain Assessment Tool 0-10   Pain Score No Pain   Restrictions/Precautions   Precautions Aphasia;Aspiration;Bed/chair alarms;Cognitive;Fall Risk;Hard of hearing;Impulsive;Supervision on toilet/commode   Weight Bearing Restrictions No   ROM Restrictions No   Sit to Stand   Type of Assistance Needed Supervision   Physical Assistance Level No physical assistance   Sit to Stand CARE Score 4   Toilet Transfer   Type of Assistance Needed Supervision   Physical Assistance Level No physical assistance   Comment visual and physical cues for safety   Toilet Transfer CARE Score 4   Toilet Transfer   Surface Assessed Raised Toilet   Transfer Technique Standard   Limitations Noted In Balance;Endurance;Problem Solving;Safety;Sequencing;LE Strength   Adaptive Equipment Walker;Grab Bar   Exercise Tools   Other Exercise Tool 1 pinched clothespins with R hand to hang and remove from rods, increased time to attend to instructions to remove clothespins as pt was not understanding   Cognition   Overall Cognitive Status Impaired   Arousal/Participation Alert;Responsive;Cooperative   Attention Attends with cues to redirect   Orientation Level Oriented X4  (pt Ox4 when questions written on whiteboard by OT)   Memory Decreased recall of precautions;Decreased recall of recent events;Decreased short term memory   Following Commands Follows one step commands with increased time or repetition   Assessment   Treatment Assessment Pt seen for brief OT session this PM focusing on cognitive, fine motor/instrinsic hand strength, and toileting/transfer; details in respective sections. Pt tolerated activities well and required increased time to complete. Pt less fatigued than this AM and was able to attend and verbalize more to OT as well as understanding more instructions. Pt's wife present during session and provided good support and encouragement. Pt's barriers to d/c include decreased strength throughout,  decreased balance, impaired hearing, impaired cognition including safety awareness, problem solving, attention, comprehension, and expression, and decreased activity tolerance; all affect independence in self care and fxl transfers. Pt would benefit from continued skilled OT services in order to address listed barriers and prepare for safe d/c.   Prognosis Fair   Problem List Decreased strength;Decreased range of motion;Decreased endurance;Impaired balance;Decreased coordination;Decreased cognition;Decreased safety awareness;Impaired hearing   Plan   Treatment/Interventions ADL retraining;Functional transfer training;LE strengthening/ROM;Therapeutic exercise;Endurance training;Cognitive reorientation;Patient/family training;Equipment eval/education;Compensatory technique education;OT   Progress Progressing toward goals   OT Therapy Minutes   OT Time In 1300   OT Time Out 1330   OT Total Time (minutes) 30   OT Mode of treatment - Individual (minutes) 30

## 2024-03-28 NOTE — PROGRESS NOTES
Progress Note - Thomas Chase 83 y.o. male MRN: [id number]    Unit/Bed#: [facility] 213-02 Encounter: [id number]        Subjective:   Patient seen and examined at bedside after reviewing the chart and discussing the case with the caring staff.      Patient examined at bedside.  Patient noted to have mild swelling to his right upper lip.  He denies any pain.  No lesion noted.  He has no other complaints at this time.     Physical Exam   Vitals: Blood pressure 96/72, pulse 91, temperature (!) 97.3 °F (36.3 °C), temperature source Temporal, resp. rate 17, height 6' (1.829 m), weight 57.8 kg (127 lb 6.8 oz), SpO2 96%.,Body mass index is 17.28 kg/m².  Constitutional: Patient in no acute distress.  HEENT: PERR, EOMI, MMM, mild swelling to right upper lip with small superficial abrasion noted possible trauma related.  Cardiovascular: Normal rate and regular rhythm.    Pulmonary/Chest: Effort normal and breath sounds normal.   Abdomen: Soft, + BS, NT.    Assessment/Plan:  Thomas Chase is a(n) 83 y.o. year old male with left SDH and right SAH.     1.  Cardiac with hx of HTN, HLD/hypotension.  On pravastatin 40 mg daily (simvastatin nonformulary).  Noted to have low BP 3/10/24.  Patient's orthostatic vitals did not show significant drop.  Received IV fluids and started on midodrine.  Midodrine increased to 5 mg TID 3/21/24.  Continue to monitor.     2.  Allergic rhinitis.  Patient is on loratadine 10 mg daily.  3.  Depression/anxiety.  Patient is on Zoloft 25 mg daily.  4.  Insomnia.  Patient is on melatonin 6 mg at bedtime.  5.  Thrush.  Completed clotrimazole lozenges x 10 days on 3/21/24.   6.  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  Patient is mainly nonverbal.  7.  Urinary retention/enterococcal UTI.  Urinary retention protocol.  Flomax decreased to 0.4 mg daily 3/12/2024 due to possible cause of hypotension.  Finley catheter placed 2/22/24, removed on 2/28/24, placed again 3/1/24, removed again 3/7/24  and reinserted 3/8/24.    Finley removed 3/22/24 and has been voiding on own.   8.  Dysphagia.  Speech therapy following.  Dysphagia 1 puureed with nectar thick liquid.  Video swallow study done on 3/11/2024.  9.  Vitamin B12 deficiency.  Patient started on vitamin B12 500 mcg daily.    10.  Pain and bowel management.  As per physiatrist.  11.  Intracranial hemorrhage.   Patient is receiving intensive PT OT ST as per physiatrist.    Anticipated discharge date:  TBD.  Awaiting SNF bed.  PCP:  RONY Diaz    The patient was discussed with Dr. Paz and he is in agreement with the above note.

## 2024-03-28 NOTE — PROGRESS NOTES
PM&R PROGRESS NOTE:  Thomas Chase 83 y.o. male MRN: [id number]  Unit/Bed#: -02 Encounter: [id number]        **CHANGE IN REHAB DIAGNOSIS FROM PRE-ADMISSION SCREEN  Rehab Diagnosis: Impairment of mobility, safety, Activities of Daily Living (ADLs), and cognitive/communication skills due to Brain Dysfunction:  02.22  Traumatic, Closed Injury  Etiologic Diagnosis: L SDH, R SAH, L anterior hypodensity lateral temporal lobe    HPI: Thomas Chase is a 83 y.o. 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  Unclear if patient had head trauma.  CT head showing a 3 mm left frontal subdural hemorrhage.  CT lumbar spine showing DJD.  Patient found to have dysphagia and was seen by speech therapy.  VFSS on 2/15/2024 cleared him for a puréed diet with thin liquids.  Patient was also started by psychiatry for depression and started on Zoloft 25 mg daily.  Patient sustained a rapid response and a unwitnessed fall with head trauma notable bump on head on 2/15/2024.  CT head showing a new small volume acute subarachnoid hemorrhage in the left frontal opercular region as well as new nondisplaced fracture of the zygomatic arch with soft tissue contusion, stable 3 mm subdural hemorrhage seen previously.  Patient transferred to [facility] for further evaluation.  Patient seen by neurology and neurosurgery.  CTA head and neck showing a left anterior hypodensity in the left anterior lateral temporal lobe with 2 punctate hyperdensities.  These represented possible nonhemorrhagic contusions or venous infarcts.  Less likely to represent ischemia or neoplasm.  Follow-up imaging recommended with CT head in 2-3 weeks.  CT  venogram showing patent dural venous sinuses.  Patient was cleared for DVT prophylaxis and started on Keppra for seizure prophylaxis.    Medical course complicated by suspected UTI based on UA and symptoms.  Patient was treated with antibiotics x 3 days.  After medical stabilization, patient found to have acute functional deficits from his baseline in mobility, self-care, speech swallow cognition therefore admitted to [facility] for acute inpatient rehabilitation.    SUBJECTIVE: Patient seen face to face.  No new issues.  Plan for bladder is now to scan only for lack of void over shift.  Awaiting SNF bed as this is his wife's preference.     ASSESSMENT: Stable, progressing      PLAN:    Rehabilitation  Functional deficits: Aphasia, dysphagia, impaired cognition, impaired balance, impulsivity, poor safety awareness impaired mobility, self care    Continue current rehabilitation plan of care to maximize function.  Expected Discharge:  Awaiting SNF bed       Current Function:  Physical Therapy Occupational Therapy Speech Therapy   Weight Bearing Status: Weight Bearing as Tolerated  Transfers: Incidental Touching, Supervision  Bed Mobility: Incidental Touching, Supervision  Amulation Distance (ft): 206 feet  Ambulation: Incidental Touching  Assistive Device for Ambulation: Roller Walker  Wheelchair Mobility Distance: 133 ft  Wheelchair Mobility: Maximum Assistance (max visual and tactile cues)  Number of Stairs: 14  Assistive Device for Stairs: Bilateral Hand Rails  Stair Assistance: Incidental Touching  Ramp: Incidental Touching  Assistive Device for Ramp: Roller Walker  Discharge Recommendations: Home with:  DC Home with:: First Floor Setup, Home Physical Therapy   Eating: Supervision  Grooming: Supervision  Bathing: Minimal Assistance  Bathing: Minimal Assistance  Upper Body Dressing: Minimal Assistance  Lower Body Dressing: Maximum Assistance  Toileting: Maximum Assistance  Tub/Shower Transfer: Incidental  Touching  Toilet Transfer: Supervision  Cognition: Exceptions to WNL  Cognition: Decreased Executive Functions, Decreased Attention, Decreased Comprehension, Decreased Safety  Orientation: Person, Place, Time   Mode of Communication: Non-verbal  Speech/Language: Expressive Aphasia  Cognition: Exceptions to WNL  Cognition: Decreased Memory, Decreased Executive Functions, Decreased Attention, Decreased Comprehension, Decreased Safety  Orientation: Person, Place (unintelligible response for time and situation.)  Swallowing: Exceptions to WNL  Swallowing: Oral Dysphagia, Pharyngeal Dysphagia, Aspiration Risk  Diet Recommendations: Level 1/Thange, Ambrosio Thick  Discharge Recommendations:  (home vs. SNF)  DC Home with:: 24 Hour Supervision, Family Support (continued speech therapy services)       DVT prophylaxis  Continue Lovenox      * Intracranial hemorrhage (HCC)-resolved as of 3/21/2024  Assessment & Plan  Initial presentation on 2/13/2024 with increased weakness in the legs, mental status changes for several days  CT head showing a 3 mm left frontal subdural hemorrhage  Rapid response 2/15/2024 with fall and head trauma  CT head showing a new small volume acute subarachnoid hemorrhage in the left frontal opercular region and new nondisplaced fracture of the zygomatic arch with soft tissue contusion.    Transferred to [facility]  CTA head and neck and and repeat CT head showing a left anterior hypodensity in the left anterior lateral temporal lobe with 2 punctate hyperdensities.  Differential diagnosis nonhemorrhagic contusions or venous infarcts.  Patient seen by neurology and neurosurgery  Conservative management  Cleared for DVT prophylaxis  Started on Keppra for seizure prophylaxis  Repeat CT head scheduled on Monday, 3/4/2024 -personally reviewed  Radiology read as follows:  Improving left anterior temporal lobe contusions with resolving edema. Stable subacute subdural hemorrhage along the anterior left frontal  convexity. Resolved subarachnoid and intraventricular hemorrhages. Unchanged minimal hyperdensity beneath the left craniotomy flap. Stable right parafalcine subdural hygroma. There is no new intra or extra-axial hemorrhage.  Follow-up with neurosurgery on 3/6/2024 virtually completed.  Recommendations as follows: Neurosurgery will sign off, patient will follow-up in 2 weeks with repeat CT head   CTH 3/16/24: No acute intracranial abnormality.Stable thin chronic left frontal subdural collection stable since 11/10/2023. Evaluation of the left temporal lobe is limited due to artifact from the cochlear implant but there may be mild developing encephalomalacia related to prior contusion.  Follow-up with neurosurgery and neurology in outpatient clinic (appointment is 3/21/24 with neurology in 3/22/2024 with neurosurgery)  Follow-up with Neurology in 3 months  Follow-up with Neurosurgery PRN    Behavior safety risk  Assessment & Plan  Patient demonstrating mild impulsivity in acute care  Fall x 2 in acute care  Falls at home in the past    Safety behavior plan going well and ARC.  No further impulsivity  Nursing and staff to provide close supervision near nursing station  Patient placed on 4 side rails (not as a restraint, patient and wife preference)  Monitor sleep-wake cycle  Avoid overstimulation: 1 visitor at a time, low lights, low sounds      Insomnia  Assessment & Plan  Continue melatonin to 9 mg QHS    Hematuria  Assessment & Plan  Started 3/2 - 3/3/2024  Minor, and more likely related to irritation from Finley catheter  Irrigation as needed per urology  Asymptomatic  H&H stable    Headache  Assessment & Plan  Resolved  Continue Tylenol 650 mg PRN     Severe protein-calorie malnutrition (HCC)  Assessment & Plan  BMI 18.06  Nutrition following  Optimize oral intake  Supplements ordered  He is eating % of meals  Hydration is variable  Maintain IV  IVF PRN  SLP working towards free water  protocol    Dysphagia  Assessment & Plan  VFSS completed 2/15/2024  Cleared for a puréed diet with nectar thick liquids  SLP to follow while at [facility]  Repeat VFSS 3/11/24: Mild oral dysphagia, Moderate oral-pharyngeal dysphagia  Recommendations:  Diet: Dysphagia 1 pureed   Liquids: Nectar   Patient doing very well with oral food eating % of all of his meals.  Also gaining weight.  At times has difficulty with fluid intake due to nectar thick liquids and dysphagia.  Repeat swallow study recommended in 3-4 weeks  Supplement with IV fluids if needed  SLP also working towards a free water nectar thick protocol with oral care prior  Possible future consideration for PEG       Low BP  Assessment & Plan  Still low at times, but asymptomatic  Continue Midodrine 5 mg TID (3/21/24) per primary service.     BP lower since 3/10/2024  Compression stockings and abdominal binder ordered  Internal medicine ordering IV fluid bolus.  I continued IVF x 1 L which was completed  BP continues to be very low especially in standing.   Labs reviewed - nothing to explain low BP        Urinary retention  Assessment & Plan  Finley catheter removed on [facility]  Trial of void underway  Mixed pattern  Unfortunately required multiple straight catheterizations  Finley catheter replaced 3/1/24 per urology  Flomax reduced to 0.4 mg daily due to hypotension   Trial of void 3/7/2024 - failed this  Finley re-inserted 3/8/24  Patient likely to require longer time prior to removal of Finley catheter -recommend removal in 2 weeks around 3/22/2024  Finley removed and Trial of void started 3/22/2024  Slow in the beginning however after good BM patient was starting to void more with lower PVRs.  Having both continent and incontinent episodes -mixed pattern  Timed toilet frequently  Bladder scans now for lack of void over shift       Constipation due to neurogenic bowel  Assessment & Plan  Patient started on more aggressive bowel program yesterday  BM  3/24/24  Lactulose and suppository today    Hearing loss  Assessment & Plan  Chronic hearing loss  Patient status post cochlear implant  MRI contraindicated    Hypercholesterolemia  Assessment & Plan  Continue simvastatin 20 mg daily (home dose)    UTI (urinary tract infection)-resolved as of 2/21/2024  Assessment & Plan  Suspected UTI based on UA and symptoms in acute care  Completed 3 days IV antibiotics  In ARC:  UA was ordered by IM : equivocal - neg.   Culture was done by internal medicine.  Enterococcus faecalis  Likely colonized.  Asymptomatic.  Over the weekend, however, Dr. Paz did decide to initiate treatment with Levaquin x 5 days.          Appreciate IM consultants medical co-management.  Labs, medications, and imaging personally reviewed.      ROS:  A ten point review of systems was completed on 03/28/24 and pertinent positives are listed in subjective section. All other systems reviewed were negative.       OBJECTIVE:   BP 96/72 (BP Location: Left arm)   Pulse 91   Temp (!) 97.3 °F (36.3 °C) (Temporal)   Resp 17   Ht 6' (1.829 m)   Wt 57.8 kg (127 lb 6.8 oz)   SpO2 96%   BMI 17.28 kg/m²     Physical Exam  Vitals and nursing note reviewed.   Constitutional:       General: He is not in acute distress.  HENT:      Head: Normocephalic and atraumatic.      Nose: Nose normal.      Mouth/Throat:      Mouth: Mucous membranes are moist.   Eyes:      Conjunctiva/sclera: Conjunctivae normal.   Cardiovascular:      Rate and Rhythm: Normal rate and regular rhythm.      Pulses: Normal pulses.   Pulmonary:      Effort: Pulmonary effort is normal.      Breath sounds: Normal breath sounds. No wheezing or rales.   Abdominal:      General: Bowel sounds are normal. There is no distension.      Palpations: Abdomen is soft.      Tenderness: There is no abdominal tenderness.   Musculoskeletal:         General: No swelling.      Cervical back: Neck supple.   Skin:     General: Skin is warm.   Neurological:       Mental Status: He is alert and oriented to person, place, and time.   Psychiatric:         Mood and Affect: Mood normal.          Lab Results   Component Value Date    WBC 5.63 03/25/2024    HGB 11.7 (L) 03/25/2024    HCT 37.0 03/25/2024    MCV 98 03/25/2024     (H) 03/25/2024     Lab Results   Component Value Date    SODIUM 139 03/25/2024    K 4.2 03/25/2024     03/25/2024    CO2 27 03/25/2024    BUN 16 03/25/2024    CREATININE 0.58 (L) 03/25/2024    GLUC 82 03/25/2024    CALCIUM 8.8 03/25/2024     Lab Results   Component Value Date    INR 1.02 06/02/2022    INR 1.10 05/29/2022    INR 1.04 05/28/2022    PROTIME 13.0 06/02/2022    PROTIME 13.7 05/29/2022    PROTIME 13.2 05/28/2022           Current Facility-Administered Medications:     acetaminophen (TYLENOL) tablet 650 mg, 650 mg, Oral, Q6H PRN, Daniel Clayton MD, 650 mg at 03/24/24 2054    bisacodyl (DULCOLAX) rectal suppository 10 mg, 10 mg, Rectal, Daily PRN, Darwin Paz MD, 10 mg at 03/22/24 1327    bisacodyl (DULCOLAX) rectal suppository 10 mg, 10 mg, Rectal, Once, Alexandrea Lugo MD    cyanocobalamin (VITAMIN B-12) tablet 500 mcg, 500 mcg, Oral, Daily, Darwin Paz MD, 500 mcg at 03/28/24 0932    enoxaparin (LOVENOX) subcutaneous injection 40 mg, 40 mg, Subcutaneous, Q24H JEANNIE, Alexandrea Lugo MD, 40 mg at 03/28/24 0932    lactulose (CHRONULAC) oral solution 20 g, 20 g, Oral, BID PRN, Darwin Paz MD, 20 g at 03/28/24 1131    lactulose (CHRONULAC) oral solution 20 g, 20 g, Oral, Once, Alexandrea Lugo MD    loratadine (CLARITIN) tablet 10 mg, 10 mg, Oral, Daily, Amada L Dagnall, PA-C, 10 mg at 03/28/24 0932    meclizine (ANTIVERT) tablet 25 mg, 25 mg, Oral, Q8H PRN, Amada LEE Dagnall, PA-C    melatonin tablet 9 mg, 9 mg, Oral, HS, Alexandrea Lugo MD, 9 mg at 03/27/24 2140    midodrine (PROAMATINE) tablet 5 mg, 5 mg, Oral, TID AC, Amada LEE Dagnall, PA-C, 5 mg at 03/28/24 0746    multivitamin-minerals (CENTRUM) tablet 1  tablet, 1 tablet, Oral, Daily, Amada L Dagnall, PA-C, 1 tablet at 03/28/24 0932    polyethylene glycol (MIRALAX) packet 17 g, 17 g, Oral, Daily PRN, Ashley Depadua, MD, 17 g at 03/21/24 1655    pravastatin (PRAVACHOL) tablet 40 mg, 40 mg, Oral, Daily With Dinner, Amada L Dagnall, PA-C, 40 mg at 03/27/24 1810    senna (SENOKOT) tablet 8.6 mg, 1 tablet, Oral, HS, Alexandrea Lugo MD, 8.6 mg at 03/27/24 2140    sertraline (ZOLOFT) tablet 25 mg, 25 mg, Oral, Daily, Amada L Dagnall, PA-C, 25 mg at 03/28/24 0932    tamsulosin (FLOMAX) capsule 0.4 mg, 0.4 mg, Oral, Daily With Dinner, Darwin Paz MD, 0.4 mg at 03/27/24 1810    Past Medical History:   Diagnosis Date    Arthritis     Encounter for general adult medical examination without abnormal findings 03/20/2019    Fall 11/03/2022    Hyperlipidemia     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    Macular degeneration, wet (HCC)     Urinary retention 06/02/2022       Patient Active Problem List    Diagnosis Date Noted    Behavior safety risk 02/20/2024    Insomnia 03/21/2024    Abnormal CT of the head 03/21/2024    Hematuria 03/04/2024    Headache 02/20/2024    Bilateral lower extremity weakness 02/17/2024    Abnormal CT scan, lumbar spine 02/15/2024    Severe protein-calorie malnutrition (HCC) 02/14/2024    Debility 02/13/2024    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    Dysphagia 11/21/2023    Macular degeneration, age related 02/27/2023    H/O traumatic subdural hematoma 02/27/2023    Sensorineural hearing loss (SNHL), bilateral 08/18/2022    Balance disorder 06/28/2022    Abnormal echocardiogram 06/27/2022    Right bundle-branch block 06/27/2022    Low BP 06/19/2022    Diastolic dysfunction 06/19/2022    EKG abnormalities 06/19/2022    Constipation due to neurogenic bowel 06/02/2022    Urinary retention 06/02/2022    SDH (subdural hematoma) (HCC) 05/27/2022    Primary osteoarthritis of left knee 05/17/2022    Hygroma 04/26/2022    Right hand pain     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      ** Please Note:  voice to text software may have been used in the creation of this document. Although proof errors in transcription or interpretation are a potential of such software**

## 2024-03-28 NOTE — NUTRITION
Swelling noted to right upper lip this AM. Patient denies pain. Unsure if cold sore or if patient may have bit lip. Amada GONZALES made aware of same.

## 2024-03-28 NOTE — PROGRESS NOTES
03/28/24 1100   Pain Assessment   Pain Assessment Tool 0-10   Pain Score No Pain   Restrictions/Precautions   Precautions Aphasia;Aspiration;Bed/chair alarms;Cognitive;Fall Risk;Hard of hearing;Impulsive;Supervision on toilet/commode   Weight Bearing Restrictions No   ROM Restrictions No   Cognition   Overall Cognitive Status Impaired   Arousal/Participation Alert;Responsive;Cooperative   Attention Attends with cues to redirect   Orientation Level Oriented to person;Oriented to place   Memory Decreased recall of precautions;Decreased recall of recent events;Decreased short term memory   Following Commands Follows one step commands with increased time or repetition   Roll Left and Right   Comment Pt OOB   Sit to Lying   Comment Pt OOB   Lying to Sitting on Side of Bed   Comment Pt OOB   Sit to Stand   Type of Assistance Needed Supervision   Physical Assistance Level No physical assistance   Comment Cl S RW; increased time and multiple attempts   Sit to Stand CARE Score 4   Bed-Chair Transfer   Type of Assistance Needed Incidental touching   Physical Assistance Level No physical assistance   Comment CGA/ Cl S RW   Chair/Bed-to-Chair Transfer CARE Score 4   Car Transfer   Reason if not Attempted Environmental limitations   Car Transfer CARE Score 10   Walk 10 Feet   Type of Assistance Needed Incidental touching   Physical Assistance Level No physical assistance   Comment CGA / Cl S RW; Visual and tactile cues for direction   Walk 10 Feet CARE Score 4   Walk 50 Feet with Two Turns   Type of Assistance Needed Incidental touching   Physical Assistance Level No physical assistance   Comment CGA / Cl S RW; Visual and tactile cues for direction   Walk 50 Feet with Two Turns CARE Score 4   Walk 150 Feet   Type of Assistance Needed Incidental touching   Physical Assistance Level No physical assistance   Comment CGA / Cl S RW; Visual and tactile cues for direction   Walk 150 Feet CARE Score 4   Ambulation   Primary Mode of  Locomotion Prior to Admission Walk   Distance Walked (feet) 200 ft   Assist Device Roller Walker   Gait Pattern Inconsistant Maria Victoria;Slow Maria Victoria;Decreased foot clearance;Forward Flexion;Narrow ILIANA;Shuffle   Limitations Noted In Balance;Device Management;Endurance;Heel Strike;Posture;Safety;Sequencing;Speed;Strength   Provided Assistance with: Balance;Direction   Walk Assist Level Contact Guard;Close Supervision   Findings CGA / Cl S RW; Visual and tactile cues for direction   Does the patient walk? 2. Yes   Wheel 50 Feet with Two Turns   Reason if not Attempted Activity not applicable   Wheel 50 Feet with Two Turns CARE Score 9   Wheel 150 Feet   Reason if not Attempted Activity not applicable   Wheel 150 Feet CARE Score 9   Wheelchair mobility   Findings N/A   Curb or Single Stair   Style negotiated Single stair   Type of Assistance Needed Incidental touching   Physical Assistance Level No physical assistance   Comment CGA/ Cl S up/down 14 steps with B/L HR; Tactile and visual cues for sequencing   1 Step (Curb) CARE Score 4   4 Steps   Type of Assistance Needed Incidental touching   Physical Assistance Level No physical assistance   Comment CGA/ Cl S up/down 14 steps with B/L HR; Tactile and visual cues for sequencing   4 Steps CARE Score 4   12 Steps   Type of Assistance Needed Incidental touching   Physical Assistance Level No physical assistance   Comment CGA/ Cl S up/down 14 steps with B/L HR; Tactile and visual cues for sequencing   12 Steps CARE Score 4   Picking Up Object   Type of Assistance Needed Incidental touching   Physical Assistance Level No physical assistance   Comment CGA  objects from floor with R UE, L UE for support on RW; tactile and visual cues for sequencing   Picking Up Object CARE Score 4   Toilet Transfer   Comment Pt did not require during session   Therapeutic Interventions   Neuromuscular Re-Education Picking up objects from floor   Other transfer training, stair training,  gait training   Equipment Use   Gr[employer] L2, 10 min, 0.38 mi   Assessment   Treatment Assessment Pt agreeable to PT this AM, received sitting upright in recliner. Pt continues to require visual and tactile cueing for functional mobility. Pt demonstrated good trunk control and balance picking up objects around PT gym, with good carryover of task after initial visual/tactile cueing for sequencing. Will continue with current PT POC to improve deficits and promote return to PLOF.   Problem List Decreased strength;Decreased range of motion;Decreased endurance;Impaired balance;Decreased coordination;Decreased cognition;Decreased safety awareness;Impaired hearing   PT Barriers   Physical Impairment Decreased strength;Decreased range of motion;Decreased endurance;Impaired balance;Decreased mobility;Decreased cognition;Impaired judgement;Decreased safety awareness;Impaired hearing   Functional Limitation Wheelchair management;Walking;Transfers;Standing;Stair negotiation;Ramp negotiation;Car transfers   Plan   Treatment/Interventions Functional transfer training;LE strengthening/ROM;Therapeutic exercise;Endurance training;Cognitive reorientation;Patient/family training;Equipment eval/education;Bed mobility;Gait training   Progress Progressing toward goals   PT Therapy Minutes   PT Time In 1100   PT Time Out 1200   PT Total Time (minutes) 60   PT Mode of treatment - Individual (minutes) 60   PT Mode of treatment - Concurrent (minutes) 0   PT Mode of treatment - Group (minutes) 0   PT Mode of treatment - Co-treat (minutes) 0   PT Mode of Treatment - Total time(minutes) 60 minutes   PT Cumulative Minutes 2310     Patient remains OOB in chair, all needs in reach.  Alarm in place and activated.  Encouraged use of call bell, patient verbalizes understanding.

## 2024-03-28 NOTE — NURSING NOTE
Pt incontinent of urine x 2 during the night. Bladder scanned at 250. Pt slept during the night. Respirations even and unlabored Plan of care ongoing.

## 2024-03-28 NOTE — PROGRESS NOTES
03/28/24 0700   Pain Assessment   Pain Assessment Tool 0-10   Pain Score No Pain   Restrictions/Precautions   Precautions Aphasia;Aspiration;Bed/chair alarms;Cognitive;Fall Risk;Hard of hearing;Impulsive;Supervision on toilet/commode   Weight Bearing Restrictions No   ROM Restrictions No   Eating   Type of Assistance Needed Supervision   Physical Assistance Level No physical assistance   Comment increased coughing this session after >50% of bites with cues to double-swallow, pt with low appetite and required increased cues and time for intake   Eating CARE Score 4   Oral Hygiene   Type of Assistance Needed Supervision   Physical Assistance Level No physical assistance   Comment visual cues for thoroughness   Oral Hygiene CARE Score 4   Grooming   Able To Comb/Brush Hair;Wash/Dry Face;Brush/Clean Teeth   Limitation Noted In Problem Solving;Safety;Sequencing   Shower/Bathe Self   Type of Assistance Needed Physical assistance   Physical Assistance Level 26%-50%   Comment assist for bilat lower LE, buttocks, perineal thoroughness; physical and visual cues for sequencing   Shower/Bathe Self CARE Score 3   Bathing   Assessed Bath Style Sponge Bath   Anticipated D/C Bath Style Sponge Bath;Shower   Able to Gather/Transport No   Able to Wash/Rinse/Dry (body part) Left Arm;Right Arm;L Upper Leg;R Upper Leg;Chest;Abdomen   Limitations Noted in Balance;Endurance;Problem Solving;ROM;Safety;Sequencing   Positioning Seated;Standing   Upper Body Dressing   Type of Assistance Needed Physical assistance   Physical Assistance Level 51%-75%   Comment assist to doff/don flannel shirt, don new t-shirt over LUE and pull down over head   Upper Body Dressing CARE Score 2   Lower Body Dressing   Type of Assistance Needed Physical assistance   Physical Assistance Level 76% or more   Comment able to pull underwear up over hips when standing   Lower Body Dressing CARE Score 2   Putting On/Taking Off Footwear   Type of Assistance Needed  Physical assistance   Physical Assistance Level 76% or more   Comment able to present bilat feet to OT to doff/don footwear   Putting On/Taking Off Footwear CARE Score 2   Dressing/Undressing Clothing   Remove UB Clothes Pullover Shirt;Jacket   Don UB Clothes Pullover Shirt;Jacket   Remove LB Clothes Undergarment;Socks   Don LB Clothes Pants;Undergarment;Socks;TEDs   Limitations Noted In Balance;Endurance;Problem Solving;Safety;Sequencing;ROM   Positioning Supported Sit   Lying to Sitting on Side of Bed   Type of Assistance Needed Physical assistance   Physical Assistance Level 25% or less   Lying to Sitting on Side of Bed CARE Score 3   Sit to Stand   Type of Assistance Needed Supervision   Physical Assistance Level No physical assistance   Sit to Stand CARE Score 4   Toileting Hygiene   Type of Assistance Needed Physical assistance   Physical Assistance Level 76% or more   Comment pt did not void once on toilet; assist for clothing management   Toileting Hygiene CARE Score 2   Toileting   Able to Pull Clothing down no, up no.   Manage Hygiene   (did not void)   Limitations Noted In Balance;Problem Solving;Safety;Sequencing;LE Strength   Adaptive Equipment Grab Bar   Toilet Transfer   Type of Assistance Needed Supervision   Physical Assistance Level No physical assistance   Comment visual cues   Toilet Transfer CARE Score 4   Toilet Transfer   Surface Assessed Raised Toilet   Transfer Technique Standard   Limitations Noted In Balance;Endurance;Problem Solving;Safety;Sequencing;LE Strength   Adaptive Equipment Grab Bar;Walker   Cognition   Overall Cognitive Status Impaired   Arousal/Participation Alert;Responsive;Cooperative   Attention Attends with cues to redirect   Orientation Level Oriented to person;Oriented to place   Memory Decreased short term memory;Decreased recall of recent events;Decreased recall of precautions   Following Commands Follows one step commands with increased time or repetition   Assessment    Treatment Assessment Pt agreeable to OT session this AM. Received lying supine in bed. ADL session completed; current LOF and details listed in respective sections. Pt is overall maxA LB dressing and toileting (no void), modA UB dressing, Brayan bathing, supervision oral care; fxl transfers overall supervision with the exception of Brayan supine>sit. Pt very fatigued this session and required increased visual and physical cues to complete requested tasks. Pt's BP low at start of session; DWAYNE's donned and fluids encouraged during entire session, bilat feet elevated after ADL in recliner before and after breakfast. Supervision required for breakfast; pt with low appetite this session and increased coughing noted. Pt's barriers to d/c include decreased strength throughout, decreased balance, impaired hearing, impaired cognition including safety awareness, problem solving, attention, comprehension, and expression, and decreased activity tolerance; all affect independence in self care and fxl transfers. Pt would benefit from continued skilled OT services in order to address listed barriers and prepare for safe d/c.   Prognosis Fair   Problem List Decreased strength;Decreased range of motion;Decreased endurance;Impaired balance;Decreased coordination;Decreased cognition;Decreased safety awareness;Impaired hearing   Plan   Treatment/Interventions ADL retraining;Functional transfer training;LE strengthening/ROM;Therapeutic exercise;Endurance training;Patient/family training;Cognitive reorientation;Equipment eval/education;Compensatory technique education;OT   Progress Progressing toward goals   OT Therapy Minutes   OT Time In 0700   OT Time Out 0830   OT Total Time (minutes) 90   OT Mode of treatment - Individual (minutes) 90

## 2024-03-29 PROCEDURE — 97530 THERAPEUTIC ACTIVITIES: CPT

## 2024-03-29 PROCEDURE — 99232 SBSQ HOSP IP/OBS MODERATE 35: CPT | Performed by: PHYSICAL MEDICINE & REHABILITATION

## 2024-03-29 PROCEDURE — 97537 COMMUNITY/WORK REINTEGRATION: CPT

## 2024-03-29 PROCEDURE — 92507 TX SP LANG VOICE COMM INDIV: CPT | Performed by: NURSE PRACTITIONER

## 2024-03-29 PROCEDURE — 97110 THERAPEUTIC EXERCISES: CPT

## 2024-03-29 PROCEDURE — 92526 ORAL FUNCTION THERAPY: CPT | Performed by: NURSE PRACTITIONER

## 2024-03-29 PROCEDURE — 97535 SELF CARE MNGMENT TRAINING: CPT

## 2024-03-29 PROCEDURE — 97116 GAIT TRAINING THERAPY: CPT

## 2024-03-29 RX ADMIN — ENOXAPARIN SODIUM 40 MG: 40 INJECTION SUBCUTANEOUS at 08:10

## 2024-03-29 RX ADMIN — MIDODRINE HYDROCHLORIDE 5 MG: 5 TABLET ORAL at 08:10

## 2024-03-29 RX ADMIN — LORATADINE 10 MG: 10 TABLET ORAL at 08:10

## 2024-03-29 RX ADMIN — SERTRALINE HYDROCHLORIDE 25 MG: 25 TABLET ORAL at 08:10

## 2024-03-29 RX ADMIN — MIDODRINE HYDROCHLORIDE 5 MG: 5 TABLET ORAL at 17:19

## 2024-03-29 RX ADMIN — ACETAMINOPHEN 650 MG: 325 TABLET ORAL at 21:30

## 2024-03-29 RX ADMIN — Medication 1 TABLET: at 08:10

## 2024-03-29 RX ADMIN — MIDODRINE HYDROCHLORIDE 5 MG: 5 TABLET ORAL at 12:50

## 2024-03-29 RX ADMIN — Medication 9 MG: at 21:29

## 2024-03-29 RX ADMIN — PRAVASTATIN SODIUM 40 MG: 40 TABLET ORAL at 17:19

## 2024-03-29 RX ADMIN — TAMSULOSIN HYDROCHLORIDE 0.4 MG: 0.4 CAPSULE ORAL at 17:19

## 2024-03-29 RX ADMIN — CYANOCOBALAMIN TAB 500 MCG 500 MCG: 500 TAB at 08:10

## 2024-03-29 RX ADMIN — SENNOSIDES 8.6 MG: 8.6 TABLET, FILM COATED ORAL at 21:29

## 2024-03-29 NOTE — PROGRESS NOTES
03/29/24 0900   Pain Assessment   Pain Assessment Tool 0-10   Pain Score No Pain   Restrictions/Precautions   Precautions Aphasia;Aspiration;Bed/chair alarms;Cognitive;Fall Risk;Supervision on toilet/commode;Hard of hearing   Comprehension   Comprehension (FIM) 4 - Understands basic info/conversation 75-90% of time   Expression   Expression (FIM) 3 - Expresses basic info/needs 50-74% of time   Social Interaction   Social Interaction (FIM) 5 - Interacts appropriately with others 90% of time   Problem Solving   Problem solving (FIM) 3 - Solves basic problmes 50-74% of time   Memory   Memory (FIM) 3 - Recognizes, recalls/performs 50-74%   Speech/Language/Cognition Assessmetn   Treatment Assessment low tech AAC board F6-F8 familiar picture board ID completed with 6/10 trials with min-mod cue increased to 10/10 trials max cue. ( clothes, bed, hearing aid, glasses, socks, toothbrush, eat, drink, toilet and wash). Followed written direction via teach back visual demonstration across x10 trials of  tissue blowing. Able to sustain airflow up to 1 second across 3 trials. Unable to follow direction to blow bubbles into water for resistance.   Swallow Assessment   Swallow Treatment Assessment water via single controlled straw across 10 trials with cough x5/10 trials. Experienced suspected posterior loss at times but overall faster swallow initiaition without any additional cueing to swallow needed. Attempted CTAR but unsuccessful. Pt requested to get into bed multiple times during session, some encouragement to participate.   SLP Therapy Minutes   SLP Time In 0900   SLP Time Out 0930   SLP Total Time (minutes) 30   SLP Mode of treatment - Individual (minutes) 30   SLP Mode of treatment - Concurrent (minutes) 0   SLP Mode of treatment - Group (minutes) 0   SLP Mode of treatment - Co-treat (minutes) 0   SLP Mode of Treatment - Total time(minutes) 30 minutes   SLP Cumulative Minutes 935

## 2024-03-29 NOTE — PLAN OF CARE
Problem: PAIN - ADULT  Goal: Verbalizes/displays adequate comfort level or baseline comfort level  Description: Interventions:  - Encourage patient to monitor pain and request assistance  - Assess pain using appropriate pain scale  - Administer analgesics based on type and severity of pain and evaluate response  - Implement non-pharmacological measures as appropriate and evaluate response  - Consider cultural and social influences on pain and pain management  - Notify physician/advanced practitioner if interventions unsuccessful or patient reports new pain  Outcome: Progressing     Problem: INFECTION - ADULT  Goal: Absence or prevention of progression during hospitalization  Description: INTERVENTIONS:  - Assess and monitor for signs and symptoms of infection  - Monitor lab/diagnostic results  - Monitor all insertion sites, i.e. indwelling lines, tubes, and drains  - Monitor endotracheal if appropriate and nasal secretions for changes in amount and color  - [place] appropriate cooling/warming therapies per order  - Administer medications as ordered  - Instruct and encourage patient and family to use good hand hygiene technique  - Identify and instruct in appropriate isolation precautions for identified infection/condition  Outcome: Progressing  Goal: Absence of fever/infection during neutropenic period  Description: INTERVENTIONS:  - Monitor WBC    Outcome: Progressing     Problem: SAFETY ADULT  Goal: Patient will remain free of falls  Description: INTERVENTIONS:  - Educate patient/family on patient safety including physical limitations  - Instruct patient to call for assistance with activity   - Consult OT/PT to assist with strengthening/mobility   - Keep Call bell within reach  - Keep bed low and locked with side rails adjusted as appropriate  - Keep care items and personal belongings within reach  - Initiate and maintain comfort rounds  - Make Fall Risk Sign visible to staff  - Apply yellow socks and bracelet  for high fall risk patients  - Consider moving patient to room near nurses station  Outcome: Progressing  Goal: Maintain or return to baseline ADL function  Description: INTERVENTIONS:  -  Assess patient's ability to carry out ADLs; assess patient's baseline for ADL function and identify physical deficits which impact ability to perform ADLs (bathing, care of mouth/teeth, toileting, grooming, dressing, etc.)  - Assess/evaluate cause of self-care deficits   - Assess range of motion  - Assess patient's mobility; develop plan if impaired  - Assess patient's need for assistive devices and provide as appropriate  - Encourage maximum independence but intervene and supervise when necessary  - Involve family in performance of ADLs  - Assess for home care needs following discharge   - Consider OT consult to assist with ADL evaluation and planning for discharge  - Provide patient education as appropriate  Outcome: Progressing     Problem: DISCHARGE PLANNING  Goal: Discharge to home or other facility with appropriate resources  Description: INTERVENTIONS:  - Identify barriers to discharge w/patient and caregiver  - Arrange for needed discharge resources and transportation as appropriate  - Identify discharge learning needs (meds, wound care, etc.)  - Arrange for interpretive services to assist at discharge as needed  - Refer to Case Management Department for coordinating discharge planning if the patient needs post-hospital services based on physician/advanced practitioner order or complex needs related to functional status, cognitive ability, or social support system  Outcome: Progressing     Problem: Prexisting or High Potential for Compromised Skin Integrity  Goal: Skin integrity is maintained or improved  Description: INTERVENTIONS:  - Identify patients at risk for skin breakdown  - Assess and monitor skin integrity  - Assess and monitor nutrition and hydration status  - Monitor labs   - Assess for incontinence   - Turn  and reposition patient  - Assist with mobility/ambulation  - Relieve pressure over bony prominences  - Avoid friction and shearing  - Provide appropriate hygiene as needed including keeping skin clean and dry  - Evaluate need for skin moisturizer/barrier cream  - Collaborate with interdisciplinary team   - Patient/family teaching  - Consider wound care consult   Outcome: Progressing     Problem: Nutrition/Hydration-ADULT  Goal: Nutrient/Hydration intake appropriate for improving, restoring or maintaining nutritional needs  Description: Monitor and assess patient's nutrition/hydration status for malnutrition. Collaborate with interdisciplinary team and initiate plan and interventions as ordered.  Monitor patient's weight and dietary intake as ordered or per policy. Utilize nutrition screening tool and intervene as necessary. Determine patient's food preferences and provide high-protein, high-caloric foods as appropriate.     INTERVENTIONS:  - Monitor oral intake, urinary output, labs, and treatment plans  - Assess nutrition and hydration status and recommend course of action  - Evaluate amount of meals eaten  - Assist patient with eating if necessary   - Allow adequate time for meals  - Recommend/ encourage appropriate diets, oral nutritional supplements, and vitamin/mineral supplements  - Provide specific nutrition/hydration education as appropriate  - Include patient/family/caregiver in decisions related to nutrition  Outcome: Progressing

## 2024-03-29 NOTE — PROGRESS NOTES
03/29/24 0654   Pain Assessment   Pain Assessment Tool 0-10   Pain Score No Pain   Restrictions/Precautions   Precautions Aphasia;Aspiration;Bed/chair alarms;Cognitive;Fall Risk;Hard of hearing;Impulsive;Supervision on toilet/commode   Cognition   Overall Cognitive Status Impaired   Arousal/Participation Alert;Responsive;Cooperative   Attention Attends with cues to redirect   Orientation Level Oriented to person;Oriented to place   Memory Decreased short term memory;Decreased recall of recent events;Decreased recall of precautions   Following Commands Follows one step commands with increased time or repetition   Roll Left and Right   Type of Assistance Needed Supervision   Comment bed rails   Roll Left and Right CARE Score 4   Lying to Sitting on Side of Bed   Type of Assistance Needed Incidental touching   Comment cg using bed rail; visual/tactile cues   Lying to Sitting on Side of Bed CARE Score 4   Sit to Stand   Type of Assistance Needed Incidental touching   Comment cg with RW; visula/tactile cues for sequence/hand placement   Sit to Stand CARE Score 4   Bed-Chair Transfer   Type of Assistance Needed Incidental touching   Comment cg with RW; visula/tactile cues for sequence/hand placement   Chair/Bed-to-Chair Transfer CARE Score 4   Walk 10 Feet   Type of Assistance Needed Incidental touching;Supervision   Comment cg/close S level and unlevel surfaces with RW; visual/tactile cues for sequence/direction   Walk 10 Feet CARE Score 4   Walk 50 Feet with Two Turns   Type of Assistance Needed Incidental touching;Supervision   Comment cg/close S level and unlevel surfaces with RW; visual/tactile cues for sequence/direction   Walk 50 Feet with Two Turns CARE Score 4   Walk 150 Feet   Type of Assistance Needed Incidental touching;Supervision   Comment cg/close S level and unlevel surfaces with RW; visual/tactile cues for sequence/direction   Walk 150 Feet CARE Score 4   Walking 10 Feet on Uneven Surfaces   Type of  Assistance Needed Incidental touching;Supervision   Comment cg/close S level and unlevel surfaces with RW; visual/tactile cues for sequence/direction   Walking 10 Feet on Uneven Surfaces CARE Score 4   Ambulation   Primary Mode of Locomotion Prior to Admission Walk   Distance Walked (feet) 153 ft  (18' x 2 ( in and out of bathroom) 92' 153')   Assist Device Roller Walker   Gait Pattern Inconsistant Maria Victoria;Slow Maria Victoria;Decreased foot clearance;Forward Flexion;Narrow ILIANA;Shuffle   Limitations Noted In Balance;Device Management;Endurance;Heel Strike;Posture;Safety;Sequencing;Speed;Strength   Provided Assistance with: Balance;Direction   Walk Assist Level Contact Guard;Close Supervision   Findings cg/close S level and unlevel surfaces with RW; visual/tactile cues for sequence/direction   Does the patient walk? 2. Yes   Curb or Single Stair   Style negotiated Single stair   Type of Assistance Needed Incidental touching   Comment cg using 2 handrails; visual/tactile cues for sequence/technique   1 Step (Curb) CARE Score 4   4 Steps   Type of Assistance Needed Incidental touching   Comment cg using 2 handrails; visual/tactile cues for sequence/technique   4 Steps CARE Score 4   12 Steps   Type of Assistance Needed Incidental touching   Comment cg using 2 handrails; visual/tactile cues for sequence/technique   12 Steps CARE Score 4   Stairs   Type Stairs   # of Steps 14   Weight Bearing Precautions WBAT;Fall Risk   Assist Devices Bilateral Rail   Findings cg using 2 handrails; visual/tactile cues for sequence/technique   Toilet Transfer   Type of Assistance Needed Incidental touching   Comment cg with RW and grab bar; visual/tactile cues for sequence/hand placement   Toilet Transfer CARE Score 4   Therapeutic Interventions   Strengthening supine and seated ther ex   Balance gait and transfer training   Other stair negotiation   Assessment   Treatment Assessment Patient agreeable to therapy session.  No pain reported.  BP  77/48 initially then increased slightly to 80/57.    Incontinent of urine in brief.  Patient toileted, however, DNV.   Continues to require increased visual/tactile cues for sequence/technique/direction/hand placement for all tasks.   Completed ther ex for general LE strengthening; gait and transfer training focusing on sequence and technique for improved balance and safety with functional mobility using walker.  Negotiated steps with 2 handrals with cues for sequence.   PT Barriers   Physical Impairment Decreased strength;Decreased range of motion;Decreased endurance;Impaired balance;Decreased mobility;Decreased cognition;Impaired judgement;Decreased safety awareness;Impaired hearing   Functional Limitation Wheelchair management;Walking;Transfers;Standing;Stair negotiation;Ramp negotiation;Car transfers   Plan   Treatment/Interventions Functional transfer training;LE strengthening/ROM;Elevations;Therapeutic exercise;Bed mobility;Gait training   Progress Improving as expected   PT Therapy Minutes   PT Time In 0654   PT Time Out 0819   PT Total Time (minutes) 85   PT Mode of treatment - Individual (minutes) 85   PT Mode of treatment - Concurrent (minutes) 0   PT Mode of treatment - Group (minutes) 0   PT Mode of treatment - Co-treat (minutes) 0   PT Mode of Treatment - Total time(minutes) 85 minutes   PT Cumulative Minutes 2231

## 2024-03-29 NOTE — PROGRESS NOTES
Progress Note - Thomas Chase 83 y.o. male MRN: [id number]    Unit/Bed#: [facility] 213-02 Encounter: [id number]        Subjective:   Patient seen and examined at bedside after reviewing the chart and discussing the case with the caring staff.      Patient examined at bedside.  Patient has no acute symptoms.     Physical Exam   Vitals: Blood pressure 106/66, pulse 70, temperature 97.7 °F (36.5 °C), temperature source Temporal, resp. rate 17, height 6' (1.829 m), weight 57.8 kg (127 lb 6.8 oz), SpO2 92%.,Body mass index is 17.28 kg/m².  Constitutional: Patient in no acute distress.  HEENT: PERR, EOMI, MMM.  Cardiovascular: Normal rate and regular rhythm.    Pulmonary/Chest: Effort normal and breath sounds normal.   Abdomen: Soft, + BS, NT.    Assessment/Plan:  Thomas Chase is a(n) 83 y.o. year old male with left SDH and right SAH.     1.  Cardiac with hx of HTN, HLD/hypotension.  On pravastatin 40 mg daily (simvastatin nonformulary).  Noted to have low BP 3/10/24.  Patient's orthostatic vitals did not show significant drop.  Received IV fluids and started on midodrine.  Midodrine increased to 5 mg TID 3/21/24.  Continue to monitor.    2.  Allergic rhinitis.  Patient is on loratadine 10 mg daily.   3.  Depression/anxiety.  Patient is on Zoloft 25 mg daily.   4.  Insomnia.  Patient is on melatonin 6 mg at bedtime.   5.  Thrush.  Completed clotrimazole lozenges x 10 days on 3/21/24.   6.  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  Patient is mainly nonverbal.   7.  Urinary retention/UTI.  Urinary retention protocol.  Flomax decreased to 0.4 mg daily 3/12/2024 due to possible cause of hypotension.  Finley catheter placed 2/22/24, removed on 2/28/24, placed again 3/1/24, removed again 3/7/24 and reinserted 3/8/24.    Finley removed 3/22/24 and has been voiding well on own.   8.  Dysphagia.  Speech therapy following.  Dysphagia 1 puureed with nectar thick liquid.  Video swallow study done on 3/11/2024.  9.   Vitamin B12 deficiency.  Patient started on vitamin B12 500 mcg daily.   10.  Pain and bowel management.  As per physiatrist.   11.  Intracranial hemorrhage.   Patient is receiving intensive PT OT ST as per physiatrist.     Anticipated discharge date:  TBD.  Awaiting SNF bed.  PCP:  RONY Diaz    The patient was discussed with Dr. Paz and he is in agreement with the above note.

## 2024-03-29 NOTE — NURSING NOTE
Pt was visibly distressed and agitated at change of shift. Day shift nurse confirmed patient had been increasingly agitated since 1700 when visitors began readying to leave. Pt was brought to sit with staff during report for safety purposes and during report attempted to stand and made multiple unsafe attempts to transfer self without assistive devices. All attempts to sooth and meet patients needs were met with failure. Hospitalist Enrico Garrett messaged with request for continual observation for the safety of the pt. Order placed and implemented at 1905. Pt is laying in bed per his request and is resting at the moment. Will continue to observe and provide care as needed.

## 2024-03-29 NOTE — PROGRESS NOTES
03/29/24 1230   Pain Assessment   Pain Assessment Tool 0-10   Pain Score No Pain   Restrictions/Precautions   Precautions Aphasia;Aspiration;Bed/chair alarms;Cognitive;Fall Risk;Hard of hearing;Impulsive;Supervision on toilet/commode   Weight Bearing Restrictions No   ROM Restrictions No   Eating   Type of Assistance Needed Supervision   Physical Assistance Level No physical assistance   Comment finishing lunch tray with wife upon OT's approach, pt required supervision with visual and physical cues to continue eating, coughing noted <25% of the time after bites   Eating CARE Score 4   Oral Hygiene   Type of Assistance Needed Supervision   Physical Assistance Level No physical assistance   Comment visual cues for thoroughness   Oral Hygiene CARE Score 4   Grooming   Able To Comb/Brush Hair;Wash/Dry Face;Brush/Clean Teeth   Limitation Noted In Problem Solving;Safety;Sequencing   Shower/Bathe Self   Type of Assistance Needed Physical assistance   Physical Assistance Level 26%-50%   Comment assist for bilat lower LE, buttocks, perineal thoroughness   Shower/Bathe Self CARE Score 3   Bathing   Assessed Bath Style Sponge Bath   Anticipated D/C Bath Style Sponge Bath;Shower   Able to Gather/Transport No   Able to Wash/Rinse/Dry (body part) Left Arm;Right Arm;L Upper Leg;R Upper Leg;Chest;Abdomen   Limitations Noted in Balance;Endurance;Problem Solving;ROM;Safety;Sequencing   Positioning Seated;Standing   Upper Body Dressing   Type of Assistance Needed Physical assistance   Physical Assistance Level 26%-50%   Comment assist to doff/don flannel shirt, adjust t-shirt to don LUE in correct sleeve   Upper Body Dressing CARE Score 3   Lower Body Dressing   Type of Assistance Needed Physical assistance   Physical Assistance Level 76% or more   Comment able to pull pants up and down over hips when standing   Lower Body Dressing CARE Score 2   Putting On/Taking Off Footwear   Type of Assistance Needed Physical assistance    Physical Assistance Level 76% or more   Comment able to present bilat feet to doff/don footwear; DWAYNE's already donned upon OT's approach and lowered for bathing   Putting On/Taking Off Footwear CARE Score 2   Dressing/Undressing Clothing   Remove UB Clothes Pullover Shirt;Jacket   Don UB Clothes Pullover Shirt;Jacket   Remove LB Clothes Pants;Undergarment;Socks   Don LB Clothes Pants;Undergarment;Socks   Limitations Noted In Balance;Endurance;Problem Solving;Safety;Sequencing;ROM   Positioning Supported Sit   Sit to Stand   Type of Assistance Needed Supervision   Physical Assistance Level No physical assistance   Comment visual and physical cues for hand placement   Sit to Stand CARE Score 4   Toileting Hygiene   Type of Assistance Needed Physical assistance   Physical Assistance Level 76% or more   Comment able to present bilat feet to OT to change clothing   Toileting Hygiene CARE Score 2   Toileting   Able to Pull Clothing down no, up no.   Manage Hygiene Bladder  (incontinent)   Limitations Noted In Balance;Problem Solving;ROM;Safety;Sequencing;LE Strength   Adaptive Equipment Grab Bar   Toilet Transfer   Type of Assistance Needed Supervision   Physical Assistance Level No physical assistance   Comment visual cues for safety   Toilet Transfer CARE Score 4   Toilet Transfer   Surface Assessed Raised Toilet   Transfer Technique Standard   Limitations Noted In Balance;Endurance;Problem Solving;Safety;Sequencing;LE Strength   Adaptive Equipment Grab Bar;Walker   Exercise Tools   Other Exercise Tool 1 popsicle stick matching activity, completed with 100% accuracy and no cueing for correct activity completion   Other Exercise Tool 2 placed pegs in foam board with R hand following pattern with pegs randomly given to pt by OT, errors <25% of the time that were easily corrected with visual cue from OT   Cognition   Overall Cognitive Status Impaired   Arousal/Participation Alert;Responsive;Cooperative   Attention  Attends with cues to redirect   Orientation Level Oriented to person;Oriented to place   Memory Decreased short term memory;Decreased recall of recent events;Decreased recall of precautions   Following Commands Follows one step commands with increased time or repetition   Assessment   Treatment Assessment Pt agreeable to OT session this PM. Received sitting in recliner. ADL session completed with pt's wife present as part of family training; current LOF and details listed in respective sections. Pt is maxA toileting and LB dressing, Brayan bathing and UB dressing, supervision grooming; visual and physical cues throughout for sequencing and safety. Pt's wife observed ADL session and asked appropriate questions, reporting feeling comfortable with sponge bath level ADL and was pleased with pt's participation. Pt then completed fine motor, activity tolerance, and cognitive activities; details in respective sections. Pt continues to show progress in sustained attention, working memory, and problem solving in respective tasks. Pt's daughter and son present at end of session and provided good enocuragement. Pt's barriers to d/c include decreased strength throughout, decreased balance, impaired hearing, impaired cognition including safety awareness, problem solving, attention, comprehension, and expression, and decreased activity tolerance; all affect independence in self care and fxl transfers. Pt would benefit from continued skilled OT services in order to address listed barriers and prepare for safe d/c.   Prognosis Fair   Problem List Decreased strength;Decreased range of motion;Decreased endurance;Impaired balance;Decreased coordination;Decreased cognition;Decreased safety awareness;Impaired hearing   Plan   Treatment/Interventions ADL retraining;Functional transfer training;LE strengthening/ROM;Therapeutic exercise;Endurance training;Cognitive reorientation;Patient/family training;Equipment eval/education;Compensatory  technique education;OT   Progress Progressing toward goals   OT Therapy Minutes   OT Time In 1230   OT Time Out 1400   OT Total Time (minutes) 90   OT Mode of treatment - Individual (minutes) 90

## 2024-03-29 NOTE — PROGRESS NOTES
PM&R PROGRESS NOTE:  Thomas Chase 83 y.o. male MRN: [id number]  Unit/Bed#: -02 Encounter: [id number]        **CHANGE IN REHAB DIAGNOSIS FROM PRE-ADMISSION SCREEN  Rehab Diagnosis: Impairment of mobility, safety, Activities of Daily Living (ADLs), and cognitive/communication skills due to Brain Dysfunction:  02.22  Traumatic, Closed Injury  Etiologic Diagnosis: L SDH, R SAH, L anterior hypodensity lateral temporal lobe    HPI: Thomas Chase is a 83 y.o. male with past medical history significant for previous fall with resultant traumatic brain injury-subdural hematoma in 2022 status post left craniotomy, chronic aphasia, hard of hearing with cochlear implant in place, hyperlipidemia who presented to the [facility] on 2/13/2024 with increased weakness and mental status change for several days.  Unclear if patient had head trauma.  CT head showing a 3 mm left frontal subdural hemorrhage.  CT lumbar spine showing DJD.  Patient found to have dysphagia and was seen by speech therapy.  VFSS on 2/15/2024 cleared him for a puréed diet with thin liquids.  Patient was also started by psychiatry for depression and started on Zoloft 25 mg daily.  Patient sustained a rapid response and a unwitnessed fall with head trauma notable bump on head on 2/15/2024.  CT head showing a new small volume acute subarachnoid hemorrhage in the left frontal opercular region as well as new nondisplaced fracture of the zygomatic arch with soft tissue contusion, stable 3 mm subdural hemorrhage seen previously.  Patient transferred to \[facility]\"" for further evaluation.  Patient seen by neurology and neurosurgery.  CTA head and neck showing a left anterior hypodensity in the left anterior lateral temporal lobe with 2 punctate hyperdensities.  These represented possible nonhemorrhagic contusions or venous infarcts.  Less likely to represent ischemia or neoplasm.  Follow-up imaging recommended with CT head in 2-3 weeks.  CT  venogram showing patent dural venous sinuses.  Patient was cleared for DVT prophylaxis and started on Keppra for seizure prophylaxis.    Medical course complicated by suspected UTI based on UA and symptoms.  Patient was treated with antibiotics x 3 days.  After medical stabilization, patient found to have acute functional deficits from his baseline in mobility, self-care, speech swallow cognition therefore admitted to [facility] for acute inpatient rehabilitation.    SUBJECTIVE: Patient seen face to face.  No acute issues reported by staffing.  Patient slept for part of the night only to get up to use bathroom.  Denies pain.  Denies dizziness.  Denies nausea.  Denies feeling sad or depressed, but strongly wants to go home.     ASSESSMENT: Stable, progressing      PLAN:    Rehabilitation  Functional deficits: Aphasia, dysphagia, impaired cognition, impaired balance, impulsivity, poor safety awareness impaired mobility, self care    Continue current rehabilitation plan of care to maximize function.  Expected Discharge:  Awaiting SNF bed vs home with hired assistance  His wife and primary care giver feels she may need help at home.  Ideally, 24/7 support would be good for her in the transition period.        Current Function:  Physical Therapy Occupational Therapy Speech Therapy   Weight Bearing Status: Weight Bearing as Tolerated  Transfers: Incidental Touching, Supervision  Bed Mobility: Incidental Touching, Supervision  Amulation Distance (ft): 206 feet  Ambulation: Incidental Touching  Assistive Device for Ambulation: Roller Walker  Wheelchair Mobility Distance: 133 ft  Wheelchair Mobility: Maximum Assistance (max visual and tactile cues)  Number of Stairs: 14  Assistive Device for Stairs: Bilateral Hand Rails  Stair Assistance: Incidental Touching  Ramp: Incidental Touching  Assistive Device for Ramp: Roller Walker  Discharge Recommendations: Home with:  DC Home with:: First Floor Setup, Home Physical  Therapy   Eating: Supervision  Grooming: Supervision  Bathing: Minimal Assistance  Bathing: Minimal Assistance  Upper Body Dressing: Minimal Assistance  Lower Body Dressing: Maximum Assistance  Toileting: Maximum Assistance  Tub/Shower Transfer: Incidental Touching  Toilet Transfer: Supervision  Cognition: Exceptions to WNL  Cognition: Decreased Executive Functions, Decreased Attention, Decreased Comprehension, Decreased Safety  Orientation: Person, Place, Time   Mode of Communication: Non-verbal  Speech/Language: Expressive Aphasia  Cognition: Exceptions to WNL  Cognition: Decreased Memory, Decreased Executive Functions, Decreased Attention, Decreased Comprehension, Decreased Safety  Orientation: Person, Place (unintelligible response for time and situation.)  Swallowing: Exceptions to WNL  Swallowing: Oral Dysphagia, Pharyngeal Dysphagia, Aspiration Risk  Diet Recommendations: Level 1/ThangeAmbrosio Thick  Discharge Recommendations:  (home vs. SNF)  DC Home with:: 24 Hour Supervision, Family Support (continued speech therapy services)       DVT prophylaxis  Continue Lovenox - will NOT need at discharge      * Intracranial hemorrhage (HCC)-resolved as of 3/21/2024  Assessment & Plan  Initial presentation on 2/13/2024 with increased weakness in the legs, mental status changes for several days  CT head showing a 3 mm left frontal subdural hemorrhage  Rapid response 2/15/2024 with fall and head trauma  CT head showing a new small volume acute subarachnoid hemorrhage in the left frontal opercular region and new nondisplaced fracture of the zygomatic arch with soft tissue contusion.    Transferred to [facility]  CTA head and neck and and repeat CT head showing a left anterior hypodensity in the left anterior lateral temporal lobe with 2 punctate hyperdensities.  Differential diagnosis nonhemorrhagic contusions or venous infarcts.  Patient seen by neurology and neurosurgery  Conservative management  Cleared for DVT  prophylaxis  Started on Keppra for seizure prophylaxis  Repeat CT head scheduled on Monday, 3/4/2024 -personally reviewed  Radiology read as follows:  Improving left anterior temporal lobe contusions with resolving edema. Stable subacute subdural hemorrhage along the anterior left frontal convexity. Resolved subarachnoid and intraventricular hemorrhages. Unchanged minimal hyperdensity beneath the left craniotomy flap. Stable right parafalcine subdural hygroma. There is no new intra or extra-axial hemorrhage.  Follow-up with neurosurgery on 3/6/2024 virtually completed.  Recommendations as follows: Neurosurgery will sign off, patient will follow-up in 2 weeks with repeat CT head   CTH 3/16/24: No acute intracranial abnormality.Stable thin chronic left frontal subdural collection stable since 11/10/2023. Evaluation of the left temporal lobe is limited due to artifact from the cochlear implant but there may be mild developing encephalomalacia related to prior contusion.  Follow-up with neurosurgery and neurology in outpatient clinic (appointment is 3/21/24 with neurology in 3/22/2024 with neurosurgery)  Follow-up with Neurology in 3 months  Follow-up with Neurosurgery PRN    Behavior safety risk  Assessment & Plan  Doing well in ARC  Safety behavior plan going well and ARC.  No further impulsivity  Intermittently forgetful to use call bell, but usually does well with this.   Nursing and staff to provide close supervision near nursing station  Patient placed on 4 side rails (not as a restraint, patient and wife preference)  Monitor sleep-wake cycle - better overall     Patient demonstrating mild impulsivity in acute care only   Fall x 2 in acute care  Falls at home in the past    Insomnia  Assessment & Plan  Continue melatonin to 9 mg QHS    Hematuria  Assessment & Plan  RESOVLED  Started 3/2 - 3/3/2024  Minor, and more likely related to irritation from Finley catheter  Irrigation as needed per urology  Asymptomatic  H&H  stable    Headache  Assessment & Plan  Resolved  Continue Tylenol 650 mg PRN     Severe protein-calorie malnutrition (HCC)  Assessment & Plan  BMI 17.28  Nutrition following  Optimize oral intake  Supplements ordered  He is eating % of meals  Hydration is variable  Maintain IV  IVF PRN  SLP working towards free water protocol    Dysphagia  Assessment & Plan  VFSS completed 2/15/2024  Cleared for a puréed diet with nectar thick liquids  SLP to follow while at [facility]  Repeat VFSS 3/11/24: Mild oral dysphagia, Moderate oral-pharyngeal dysphagia  Recommendations:  Diet: Dysphagia 1 pureed   Liquids: Nectar   Patient doing very well with oral food eating % of all of his meals.  Also gaining weight.  At times has difficulty with fluid intake due to nectar thick liquids and dysphagia.  Repeat swallow study recommended in 3-4 weeks  Supplement with IV fluids if needed  SLP also working towards a free water nectar thick protocol with oral care prior  Possible future consideration for PEG       Low BP  Assessment & Plan  Still low at times, but asymptomatic.  His normal appears to be SBP   Continue Midodrine 5 mg TID (3/21/24) per primary service.  Continue compression stockings during day.  Adequate hydration    BP lower since 3/10/2024  Compression stockings and abdominal binder ordered  Internal medicine ordering IV fluid bolus.  I continued IVF x 1 L which was completed  BP continues to be very low especially in standing.   Labs reviewed - nothing to explain low BP        Urinary retention  Assessment & Plan  Finley catheter removed on [facility]  Trial of void underway  Mixed pattern  Unfortunately required multiple straight catheterizations  Finley catheter replaced 3/1/24 per urology  Flomax reduced to 0.4 mg daily due to hypotension   Trial of void 3/7/2024 - failed this  Finley re-inserted 3/8/24  Patient likely to require longer time prior to removal of Finley catheter -recommend removal in 2 weeks around  3/22/2024  Finley removed and Trial of void started 3/22/2024  Slow in the beginning however after good BM patient was starting to void more with lower PVRs.  Having both continent and incontinent episodes -mixed pattern  Timed toilet frequently  Bladder scans now for lack of void over shift       Constipation due to neurogenic bowel  Assessment & Plan  Patient started on more aggressive bowel program  BM 3/28/24 & 3/29/24    Hearing loss  Assessment & Plan  Chronic hearing loss  Patient status post cochlear implant  MRI contraindicated    Hypercholesterolemia  Assessment & Plan  Continue simvastatin 20 mg daily (home dose)    UTI (urinary tract infection)-resolved as of 2/21/2024  Assessment & Plan  RESOLVED   Suspected UTI based on UA and symptoms in acute care  Completed 3 days IV antibiotics    In ARC:  UA was ordered by IM : equivocal - neg.   Culture was done by internal medicine.  Enterococcus faecalis  Likely colonized.  Asymptomatic.  Over the weekend, however, Dr. Paz did decide to initiate treatment with Levaquin x 5 days.          Appreciate IM consultants medical co-management.  Labs, medications, and imaging personally reviewed.      ROS:  A ten point review of systems was completed on 03/29/24 and pertinent positives are listed in subjective section. All other systems reviewed were negative.       OBJECTIVE:   BP (!) 84/51 (BP Location: Right arm)   Pulse 70   Temp 97.7 °F (36.5 °C) (Temporal)   Resp 17   Ht 6' (1.829 m)   Wt 57.8 kg (127 lb 6.8 oz)   SpO2 92%   BMI 17.28 kg/m²       Physical Exam  Vitals and nursing note reviewed.   Constitutional:       General: He is not in acute distress.  HENT:      Head: Normocephalic and atraumatic.      Ears:      Comments: Impaired hearing     Nose: Nose normal.      Mouth/Throat:      Mouth: Mucous membranes are moist.   Eyes:      Conjunctiva/sclera: Conjunctivae normal.   Cardiovascular:      Rate and Rhythm: Normal rate and regular rhythm.       Pulses: Normal pulses.   Pulmonary:      Effort: Pulmonary effort is normal.      Breath sounds: Normal breath sounds. No wheezing or rales.   Abdominal:      General: Bowel sounds are normal. There is no distension.      Palpations: Abdomen is soft.      Tenderness: There is no abdominal tenderness.   Musculoskeletal:         General: No swelling.      Cervical back: Neck supple.      Comments: ROM improved   Skin:     General: Skin is warm.   Neurological:      Mental Status: He is alert.      Comments: Aphasia - uses dry erase board to read and responds verbally acurately small short phrases  Balance is improving  Most ideal with RW   Psychiatric:         Mood and Affect: Mood normal.          Lab Results   Component Value Date    WBC 5.63 03/25/2024    HGB 11.7 (L) 03/25/2024    HCT 37.0 03/25/2024    MCV 98 03/25/2024     (H) 03/25/2024     Lab Results   Component Value Date    SODIUM 139 03/25/2024    K 4.2 03/25/2024     03/25/2024    CO2 27 03/25/2024    BUN 16 03/25/2024    CREATININE 0.58 (L) 03/25/2024    GLUC 82 03/25/2024    CALCIUM 8.8 03/25/2024     Lab Results   Component Value Date    INR 1.02 06/02/2022    INR 1.10 05/29/2022    INR 1.04 05/28/2022    PROTIME 13.0 06/02/2022    PROTIME 13.7 05/29/2022    PROTIME 13.2 05/28/2022           Current Facility-Administered Medications:     acetaminophen (TYLENOL) tablet 650 mg, 650 mg, Oral, Q6H PRN, Daniel Clayton MD, 650 mg at 03/28/24 2119    bisacodyl (DULCOLAX) rectal suppository 10 mg, 10 mg, Rectal, Daily PRN, Darwin Paz MD, 10 mg at 03/22/24 1327    cyanocobalamin (VITAMIN B-12) tablet 500 mcg, 500 mcg, Oral, Daily, Darwin Paz MD, 500 mcg at 03/29/24 0810    enoxaparin (LOVENOX) subcutaneous injection 40 mg, 40 mg, Subcutaneous, Q24H JEANNIE, Alexandrea Lugo MD, 40 mg at 03/29/24 0810    lactulose (CHRONULAC) oral solution 20 g, 20 g, Oral, BID PRN, Darwin Paz MD, 20 g at 03/28/24 1131    lactulose (CHRONULAC) oral  solution 20 g, 20 g, Oral, Once, Alexandrea Lugo MD    loratadine (CLARITIN) tablet 10 mg, 10 mg, Oral, Daily, Amada LEE Dagnall, PA-C, 10 mg at 03/29/24 0810    meclizine (ANTIVERT) tablet 25 mg, 25 mg, Oral, Q8H PRN, Amada Coughlinnall, PA-C    melatonin tablet 9 mg, 9 mg, Oral, HS, Alexandrea Lugo MD, 9 mg at 03/28/24 2119    midodrine (PROAMATINE) tablet 5 mg, 5 mg, Oral, TID AC, Amada L Dagnall, PA-C, 5 mg at 03/29/24 0810    multivitamin-minerals (CENTRUM) tablet 1 tablet, 1 tablet, Oral, Daily, Amada L Dagnall, PA-C, 1 tablet at 03/29/24 0810    polyethylene glycol (MIRALAX) packet 17 g, 17 g, Oral, Daily PRN, Ashley Depadua, MD, 17 g at 03/21/24 1655    pravastatin (PRAVACHOL) tablet 40 mg, 40 mg, Oral, Daily With Dinner, Amada LEE Dagnall, PA-C, 40 mg at 03/28/24 1716    senna (SENOKOT) tablet 8.6 mg, 1 tablet, Oral, HS, Alexandrea Lugo MD, 8.6 mg at 03/28/24 2119    sertraline (ZOLOFT) tablet 25 mg, 25 mg, Oral, Daily, Amada LEE Dagnall, PA-C, 25 mg at 03/29/24 0810    tamsulosin (FLOMAX) capsule 0.4 mg, 0.4 mg, Oral, Daily With Dinner, Darwin Paz MD, 0.4 mg at 03/28/24 1716    Past Medical History:   Diagnosis Date    Arthritis     Encounter for general adult medical examination without abnormal findings 03/20/2019    Fall 11/03/2022    Hyperlipidemia     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    Macular degeneration, wet (HCC)     Urinary retention 06/02/2022       Patient Active Problem List    Diagnosis Date Noted    Behavior safety risk 02/20/2024    Insomnia 03/21/2024    Abnormal CT of the head 03/21/2024    Hematuria 03/04/2024    Headache 02/20/2024    Bilateral lower extremity weakness 02/17/2024    Abnormal CT scan, lumbar spine 02/15/2024    Severe protein-calorie malnutrition (HCC) 02/14/2024    Debility 02/13/2024    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    Dysphagia 11/21/2023    Macular degeneration, age related 02/27/2023    H/O traumatic subdural hematoma 02/27/2023     Sensorineural hearing loss (SNHL), bilateral 08/18/2022    Balance disorder 06/28/2022    Abnormal echocardiogram 06/27/2022    Right bundle-branch block 06/27/2022    Low BP 06/19/2022    Diastolic dysfunction 06/19/2022    EKG abnormalities 06/19/2022    Constipation due to neurogenic bowel 06/02/2022    Urinary retention 06/02/2022    SDH (subdural hematoma) ([facility]) 05/27/2022    Primary osteoarthritis of left knee 05/17/2022    Hygroma 04/26/2022    Right hand pain     Carpal tunnel syndrome on right     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      ** Please Note:  voice to text software may have been used in the creation of this document. Although proof errors in transcription or interpretation are a potential of such software**

## 2024-03-30 PROCEDURE — 97110 THERAPEUTIC EXERCISES: CPT

## 2024-03-30 PROCEDURE — 92507 TX SP LANG VOICE COMM INDIV: CPT | Performed by: NURSE PRACTITIONER

## 2024-03-30 PROCEDURE — 97537 COMMUNITY/WORK REINTEGRATION: CPT

## 2024-03-30 PROCEDURE — 97116 GAIT TRAINING THERAPY: CPT

## 2024-03-30 PROCEDURE — 97530 THERAPEUTIC ACTIVITIES: CPT

## 2024-03-30 PROCEDURE — 92526 ORAL FUNCTION THERAPY: CPT | Performed by: NURSE PRACTITIONER

## 2024-03-30 RX ADMIN — PRAVASTATIN SODIUM 40 MG: 40 TABLET ORAL at 16:47

## 2024-03-30 RX ADMIN — CYANOCOBALAMIN TAB 500 MCG 500 MCG: 500 TAB at 08:10

## 2024-03-30 RX ADMIN — Medication 9 MG: at 21:27

## 2024-03-30 RX ADMIN — SERTRALINE HYDROCHLORIDE 25 MG: 25 TABLET ORAL at 08:10

## 2024-03-30 RX ADMIN — MIDODRINE HYDROCHLORIDE 5 MG: 5 TABLET ORAL at 11:40

## 2024-03-30 RX ADMIN — MIDODRINE HYDROCHLORIDE 5 MG: 5 TABLET ORAL at 16:47

## 2024-03-30 RX ADMIN — SENNOSIDES 8.6 MG: 8.6 TABLET, FILM COATED ORAL at 21:27

## 2024-03-30 RX ADMIN — LORATADINE 10 MG: 10 TABLET ORAL at 08:10

## 2024-03-30 RX ADMIN — TAMSULOSIN HYDROCHLORIDE 0.4 MG: 0.4 CAPSULE ORAL at 16:47

## 2024-03-30 RX ADMIN — ACETAMINOPHEN 650 MG: 325 TABLET ORAL at 21:27

## 2024-03-30 RX ADMIN — MIDODRINE HYDROCHLORIDE 5 MG: 5 TABLET ORAL at 08:09

## 2024-03-30 RX ADMIN — Medication 1 TABLET: at 08:09

## 2024-03-30 RX ADMIN — ENOXAPARIN SODIUM 40 MG: 40 INJECTION SUBCUTANEOUS at 08:09

## 2024-03-30 NOTE — PLAN OF CARE
Problem: PAIN - ADULT  Goal: Verbalizes/displays adequate comfort level or baseline comfort level  Description: Interventions:  - Encourage patient to monitor pain and request assistance  - Assess pain using appropriate pain scale  - Administer analgesics based on type and severity of pain and evaluate response  - Implement non-pharmacological measures as appropriate and evaluate response  - Notify physician/advanced practitioner if interventions unsuccessful or patient reports new pain  Outcome: Progressing     Problem: SAFETY ADULT  Goal: Patient will remain free of falls  Description: INTERVENTIONS:  - Educate patient/family on patient safety including physical limitations  - Instruct patient to call for assistance with activity   - Consult OT/PT to assist with strengthening/mobility   - Keep Call bell within reach  - Keep bed low and locked with side rails adjusted as appropriate  - Keep care items and personal belongings within reach  - Initiate and maintain comfort rounds  - Make Fall Risk Sign visible to staff  - Offer Toileting every 2 Hours, in advance of need  - Initiate/Maintain bed/chairalarm  - Apply yellow socks and bracelet for high fall risk patients  - Patient room near nurses station, pt on 1:1  Outcome: Progressing

## 2024-03-30 NOTE — PROGRESS NOTES
03/30/24 1000   Pain Assessment   Pain Assessment Tool 0-10   Pain Score No Pain   Restrictions/Precautions   Precautions Aphasia;Aspiration;Bed/chair alarms;Cognitive;Fall Risk;Hard of hearing;Impulsive;Supervision on toilet/commode   Cognition   Overall Cognitive Status Impaired   Arousal/Participation Alert;Responsive;Cooperative   Attention Attends with cues to redirect   Orientation Level Oriented to person;Oriented to place   Memory Decreased short term memory;Decreased recall of recent events;Decreased recall of precautions   Following Commands Follows one step commands with increased time or repetition   Roll Left and Right   Type of Assistance Needed Supervision   Physical Assistance Level 25% or less   Roll Left and Right CARE Score 3   Lying to Sitting on Side of Bed   Type of Assistance Needed Incidental touching   Comment cg using bed rail; visual/tactile cues   Lying to Sitting on Side of Bed CARE Score 4   Sit to Stand   Type of Assistance Needed Incidental touching  (Simultaneous filing. User may not have seen previous data.)   Physical Assistance Level No physical assistance   Comment cg with RW; visual/tactile cues for sequence/technique/hand placement   Sit to Stand CARE Score 4  (Simultaneous filing. User may not have seen previous data.)   Bed-Chair Transfer   Type of Assistance Needed Incidental touching   Comment cg with RW; visual/tactile cues for sequence/technique/hand placement   Chair/Bed-to-Chair Transfer CARE Score 4   Walk 10 Feet   Type of Assistance Needed Incidental touching   Comment cg level and unlevel surfaces with RW; increased visual/tactile cues for sequence/direction   Walk 10 Feet CARE Score 4   Walk 50 Feet with Two Turns   Type of Assistance Needed Incidental touching   Comment cg level and unlevel surfaces with RW; increased visual/tactile cues for sequence/direction   Walk 50 Feet with Two Turns CARE Score 4   Walk 150 Feet   Type of Assistance Needed Incidental  touching   Comment cg level and unlevel surfaces with RW; increased visual/tactile cues for sequence/direction   Walk 150 Feet CARE Score 4   Walking 10 Feet on Uneven Surfaces   Type of Assistance Needed Incidental touching   Comment cg level and unlevel surfaces with RW; increased visual/tactile cues for sequence/direction   Walking 10 Feet on Uneven Surfaces CARE Score 4   Ambulation   Primary Mode of Locomotion Prior to Admission Walk   Distance Walked (feet) 153 ft  (18' (into bathroom) 137' 153')   Assist Device Roller Walker   Gait Pattern Inconsistant Maria Victoria;Slow Maria Victoria;Decreased foot clearance;Forward Flexion;Narrow ILIANA;Shuffle   Limitations Noted In Balance;Coordination;Device Management;Endurance;Posture;Safety;Sequencing;Speed;Strength   Provided Assistance with: Balance;Direction   Walk Assist Level Contact Guard   Findings cg level and unlevel surfaces with RW; increased visual/tactile cues for sequence/direction   Does the patient walk? 2. Yes   Curb or Single Stair   Style negotiated Single stair   Type of Assistance Needed Incidental touching   Comment cg using 2 handrails; visual/tactile cues   1 Step (Curb) CARE Score 4   4 Steps   Type of Assistance Needed Incidental touching   Comment cg using 2 handrails; visual/tactile cues   4 Steps CARE Score 4   12 Steps   Type of Assistance Needed Incidental touching   Comment cg using 2 handrails; visual/tactile cues   12 Steps CARE Score 4   Stairs   Type Stairs   # of Steps 14   Weight Bearing Precautions WBAT;Fall Risk   Assist Devices Bilateral Rail   Findings cg using 2 handrails; visual/tactile cues   Toilet Transfer   Type of Assistance Needed Incidental touching   Comment cg using RW/grab bar; visual/tactile cues for safety   Toilet Transfer CARE Score 4   Therapeutic Interventions   Strengthening supine ther ex   Balance gait and transfer training   Other stair negotiation   Equipment Use   NuStep L1 x 10 min usinb B/L UE/LE   Assessment    Treatment Assessment Patient agreeable to therapy session.    No pain reported.   Continues to require increased visual/tactile cues for sequence/technique/direction/hand placement for all tasks.  Now a 1:1 as he is frequently trying to get up without assistance.  Completed ther ex for general LE strengthening; gait and transfer training focusing on sequence and technique for improved balance and safety with functional mobility using walker.  Negotiated steps with 2 handrails and cues.   PT Barriers   Physical Impairment Decreased strength;Decreased range of motion;Decreased endurance;Impaired balance;Decreased mobility;Decreased coordination;Decreased cognition;Impaired judgement;Decreased safety awareness;Impaired hearing   Functional Limitation Wheelchair management;Walking;Transfers;Standing;Stair negotiation;Ramp negotiation;Car transfers   Plan   Treatment/Interventions Functional transfer training;LE strengthening/ROM;Elevations;Therapeutic exercise;Bed mobility;Gait training   Progress Improving as expected   PT Therapy Minutes   PT Time In 1000   PT Time Out 1125   PT Total Time (minutes) 85   PT Mode of treatment - Individual (minutes) 85   PT Mode of treatment - Concurrent (minutes) 0   PT Mode of treatment - Group (minutes) 0   PT Mode of treatment - Co-treat (minutes) 0   PT Mode of Treatment - Total time(minutes) 85 minutes   PT Cumulative Minutes 2334   Therapy Time missed   Time missed? No

## 2024-03-30 NOTE — NURSING NOTE
Pt resting comfortably in bed, no signs of distress. Pt was alert throughout shift, followed cues and direction. No signs of agitation or unassisted attempts to get up. 1:1 d/c'd, 15 min check in place for safety.

## 2024-03-30 NOTE — PROGRESS NOTES
03/30/24 1321   Pain Assessment   Pain Assessment Tool 0-10   Pain Score No Pain   Restrictions/Precautions   Precautions Aphasia;Aspiration;Bed/chair alarms;Cognitive;Fall Risk;Hard of hearing;Supervision on toilet/commode   Weight Bearing Restrictions No   ROM Restrictions No   Eating   Type of Assistance Needed Supervision   Comment Encouragment to self feed with consuming fluids with provale cups   Eating CARE Score 4   Sit to Lying   Type of Assistance Needed Incidental touching   Sit to Lying CARE Score 4   Sit to Stand   Type of Assistance Needed Incidental touching   Comment CGA with RW   Sit to Stand CARE Score 4   Exercise Tools   Exercise Tools Yes   UE Ergometer Seated, forward/backward 4/4 minutes   Theraputty Pt utilzied B/L hands to manipul;ate Yellow theraputty to find and obtain small items from within putty with assist to manage portions of putty to locate items approximetly 50% of time   Other Exercise Tool 1 Pt utilzed Yellow Flexbar to compelte sup/pro, 2x10   Additional Activities   Additional Activities Other (Comment)   Additional Activities Comments Pt compelted functional mobilti with RW within Pts room adn facility hallways adn therapy area with CGA and cues for overall safety; In seated position, Pt completed popsicle stick match activity with intermittent cues to complete/gather each group of indincated popsicle sticks   Assessment   Treatment Assessment Pt receieved sitting in recelienr and agreeable with aprticiaption with OT session. Pt with focus with UE strengthing and fucntiaonl reaching tasks to help support improved strangth and coordinaiton for use with ADL routines; Pt required cues for overall safety/pacing/seqeunicng with tasks. Encouragment with self feeding to increased hydration with provale cups. Pt would benefit from continued particiaption with OT services to address barriers and support increased strength, balance and overall safety for use with ADL routines,  functional mobiltiy and help facilitate progress towards safe discharge.   Plan   Treatment/Interventions ADL retraining;Functional transfer training;Therapeutic exercise;Endurance training;Cognitive reorientation;Patient/family training;Equipment eval/education;Bed mobility;Compensatory technique education   Progress Progressing toward goals   OT Therapy Minutes   OT Time In 1321   OT Time Out 1411   OT Total Time (minutes) 50   OT Mode of treatment - Individual (minutes) 50

## 2024-03-30 NOTE — NURSING NOTE
Pt BP running low, medication given w/ no relief/ MD notified, encouraging fluids and continuing to monitor. Sacrum allyven changed, pt tolerated well. 1:1 observation remains in place. Pt currently sleeping w/ wife at bedside. Call bell in reach

## 2024-03-30 NOTE — PROGRESS NOTES
03/30/24 1100   Pain Assessment   Pain Assessment Tool 0-10   Pain Score No Pain   Pain Rating: FLACC (Rest) - Face 0   Pain Rating: FLACC (Rest) - Legs 0   Pain Rating: FLACC (Rest) - Activity 0   Pain Rating: FLACC (Rest) - Cry 0   Pain Rating: FLACC (Rest) - Consolability 0   Score: FLACC (Rest) 0   Restrictions/Precautions   Precautions Aphasia;Aspiration;Bed/chair alarms;Cognitive;Fall Risk;Hard of hearing;Supervision on toilet/commode   Comprehension   Assist Devices Hearing Aid   Comprehension (FIM) 4 - Understands basic info/conversation 75-90% of time   Expression   Expression (FIM) 3 - Expresses basic info/needs 50-74% of time   Social Interaction   Social Interaction (FIM) 5 - Interacts appropriately with others 90% of time   Problem Solving   Problem solving (FIM) 3 - Solves basic problmes 50-74% of time   Memory   Memory (FIM) 3 - Recognizes, recalls/performs 50-74%   Memory Skills   Orientation Level Oriented to person;Oriented to place;Oriented to situation;Disoriented to time   Speech/Language/Cognition Assessmetn   Treatment Assessment Answered written questions on whiteboard pertaining to self verbally 1-2 word answers ( intelligible in context) @ wife/daughter/sons names +, age/state/town mod cue. Longer syllable words were more difficult for patient to repeat. Low tech AAC familiar board repeated able to advance up to F10-16 pictures ID objects @ 90% provided written direction and able to ID emotions/describing words max cue for all except mod cue for hot/cold.   Recommendations   Diet Solid Recommendation Level 1 Dysphagia/pureed   Diet Liquid Recommendation Nectar thick liquid  (10cc provalve cup or single straw sips controlled by staff or wife who has been trained.) can have water only via 5CC cup or single straw controlled sips by staff or wife who has been trained.    Recommended Form of Meds Crushed;With puree   General Precautions Aspiration precautions;Minimize distractions;Upright as  "possible for all oral intake;Remain upright for 45 mins after meals;Supervision with meals   Compensatory Swallowing Strategies Alternate solids and liquids;External pacing  (small bites, slow rate, cue to double swallow. Try to keep meals under 1 hour due to fatigue as meals progres)   Results Reviewed with PT/Family/Caregiver   Eating   Type of Assistance Needed Supervision   Physical Assistance Level No physical assistance   Eating CARE Score 4   Swallow Assessment   Swallow Treatment Assessment Pt started breakfast tray well with honey thick liquids via straw, nectar thick liquids via straw as well as nectar thick liquids via 10CC provalve cup. All straw sips were controlled single sips by SLP via pinching straw off and removing. If this was not completed but not consistently taking small sips and inconsistently larger gulps. Swallow initiation varied mild-severe delay requiring cue to swallow at times. Pt appeared disinterested via body language with breakfast tray. 50% through breakfast tray patient shaking head no and repeating \"I quit\". Max encouragement provided but decline increased. This was the first session with me that patient has refused PO for. Tray removed but patient willing to continue his nutritional shake throughout remainder of session as well as an additional 4 oz of nectar thick liquids while working through speech and language tasks. It is important to note that patient continues with inconsistent coughing across all trials with puree x3, nectar provalve cup x4, honey thick liquids single straw sips x4 and nectar thick liquids single straw sips x2. Overall if straw sips are routinely managed by someone else to pinch he does ultimately seem to present with decreased cough in frequency and severity. The provalve cup makes patient more independent with controlled flow amount; however, unable to keep head in a neutral position and neck extension is most likely causing him more difficulty vs. " straw.   SLP Therapy Minutes   SLP Time In 0830   SLP Time Out 0930   SLP Total Time (minutes) 60   SLP Mode of treatment - Individual (minutes) 60   SLP Mode of treatment - Concurrent (minutes) 0   SLP Mode of treatment - Group (minutes) 0   SLP Mode of treatment - Co-treat (minutes) 0   SLP Mode of Treatment - Total time(minutes) 60 minutes   SLP Cumulative Minutes 935

## 2024-03-31 RX ADMIN — PRAVASTATIN SODIUM 40 MG: 40 TABLET ORAL at 16:48

## 2024-03-31 RX ADMIN — TAMSULOSIN HYDROCHLORIDE 0.4 MG: 0.4 CAPSULE ORAL at 16:48

## 2024-03-31 RX ADMIN — MIDODRINE HYDROCHLORIDE 5 MG: 5 TABLET ORAL at 12:12

## 2024-03-31 RX ADMIN — ACETAMINOPHEN 650 MG: 325 TABLET ORAL at 21:30

## 2024-03-31 RX ADMIN — CYANOCOBALAMIN TAB 500 MCG 500 MCG: 500 TAB at 08:43

## 2024-03-31 RX ADMIN — Medication 1 TABLET: at 08:43

## 2024-03-31 RX ADMIN — ENOXAPARIN SODIUM 40 MG: 40 INJECTION SUBCUTANEOUS at 08:43

## 2024-03-31 RX ADMIN — Medication 9 MG: at 21:30

## 2024-03-31 RX ADMIN — SENNOSIDES 8.6 MG: 8.6 TABLET, FILM COATED ORAL at 21:30

## 2024-03-31 RX ADMIN — MIDODRINE HYDROCHLORIDE 5 MG: 5 TABLET ORAL at 16:48

## 2024-03-31 RX ADMIN — MIDODRINE HYDROCHLORIDE 5 MG: 5 TABLET ORAL at 08:00

## 2024-03-31 RX ADMIN — LORATADINE 10 MG: 10 TABLET ORAL at 08:43

## 2024-03-31 RX ADMIN — SERTRALINE HYDROCHLORIDE 25 MG: 25 TABLET ORAL at 08:43

## 2024-03-31 NOTE — NURSING NOTE
Patient resting in bed at this time.  No signs of distress noted.  Patient without agitation overnight. Safety checks continue.  Bed alarm in place to promote patient safety due to forgetfulness and impulsivity.  Patient was incontinent of urine overnight assistance provided by staff for toileting tasks.  Heels elevated off of bed on a pillow.  Allevyn CDI to the sacrum.  Call bell within reach.  Plan of care ongoing.

## 2024-03-31 NOTE — NURSING NOTE
Patient ambulates assist x 1 with walker. Needs cueing and direction for tasks. Reports no pain. No attempts to get up unassisted. Had 3 episodes of bladder incontinance during day and was continant of BM. Sitting upright in recliner for all meals. Moist cough noted at times with meals. Continues on 15 minute checks. Will continue to monitor and follow plan of care.

## 2024-03-31 NOTE — PLAN OF CARE
Problem: PAIN - ADULT  Goal: Verbalizes/displays adequate comfort level or baseline comfort level  Description: Interventions:  - Encourage patient to monitor pain and request assistance  - Assess pain using appropriate pain scale  - Administer analgesics based on type and severity of pain and evaluate response  - Implement non-pharmacological measures as appropriate and evaluate response  - Consider cultural and social influences on pain and pain management  - Notify physician/advanced practitioner if interventions unsuccessful or patient reports new pain  Outcome: Progressing     Problem: INFECTION - ADULT  Goal: Absence or prevention of progression during hospitalization  Description: INTERVENTIONS:  - Assess and monitor for signs and symptoms of infection  - Monitor lab/diagnostic results  - Monitor all insertion sites, i.e. indwelling lines, tubes, and drains  - Monitor endotracheal if appropriate and nasal secretions for changes in amount and color  - [place] appropriate cooling/warming therapies per order  - Administer medications as ordered  - Instruct and encourage patient and family to use good hand hygiene technique  - Identify and instruct in appropriate isolation precautions for identified infection/condition  Outcome: Progressing  Goal: Absence of fever/infection during neutropenic period  Description: INTERVENTIONS:  - Monitor WBC    Outcome: Progressing     Problem: Nutrition/Hydration-ADULT  Goal: Nutrient/Hydration intake appropriate for improving, restoring or maintaining nutritional needs  Description: Monitor and assess patient's nutrition/hydration status for malnutrition. Collaborate with interdisciplinary team and initiate plan and interventions as ordered.  Monitor patient's weight and dietary intake as ordered or per policy. Utilize nutrition screening tool and intervene as necessary. Determine patient's food preferences and provide high-protein, high-caloric foods as appropriate.      INTERVENTIONS:  - Monitor oral intake, urinary output, labs, and treatment plans  - Assess nutrition and hydration status and recommend course of action  - Evaluate amount of meals eaten  - Assist patient with eating if necessary   - Allow adequate time for meals  - Recommend/ encourage appropriate diets, oral nutritional supplements, and vitamin/mineral supplements  - Order, calculate, and assess calorie counts as needed  - Recommend, monitor, and adjust tube feedings and TPN/PPN based on assessed needs  - Assess need for intravenous fluids  - Provide specific nutrition/hydration education as appropriate  - Include patient/family/caregiver in decisions related to nutrition  Outcome: Progressing

## 2024-04-01 PROCEDURE — 92507 TX SP LANG VOICE COMM INDIV: CPT | Performed by: NURSE PRACTITIONER

## 2024-04-01 PROCEDURE — 97530 THERAPEUTIC ACTIVITIES: CPT

## 2024-04-01 PROCEDURE — 97535 SELF CARE MNGMENT TRAINING: CPT

## 2024-04-01 PROCEDURE — 97116 GAIT TRAINING THERAPY: CPT

## 2024-04-01 PROCEDURE — 97112 NEUROMUSCULAR REEDUCATION: CPT

## 2024-04-01 PROCEDURE — 92526 ORAL FUNCTION THERAPY: CPT | Performed by: NURSE PRACTITIONER

## 2024-04-01 PROCEDURE — 97110 THERAPEUTIC EXERCISES: CPT

## 2024-04-01 PROCEDURE — 99232 SBSQ HOSP IP/OBS MODERATE 35: CPT | Performed by: PHYSICAL MEDICINE & REHABILITATION

## 2024-04-01 RX ADMIN — ENOXAPARIN SODIUM 40 MG: 40 INJECTION SUBCUTANEOUS at 08:55

## 2024-04-01 RX ADMIN — ACETAMINOPHEN 650 MG: 325 TABLET ORAL at 21:17

## 2024-04-01 RX ADMIN — TAMSULOSIN HYDROCHLORIDE 0.4 MG: 0.4 CAPSULE ORAL at 16:39

## 2024-04-01 RX ADMIN — Medication 9 MG: at 21:17

## 2024-04-01 RX ADMIN — SENNOSIDES 8.6 MG: 8.6 TABLET, FILM COATED ORAL at 21:17

## 2024-04-01 RX ADMIN — Medication 1 TABLET: at 08:55

## 2024-04-01 RX ADMIN — MIDODRINE HYDROCHLORIDE 5 MG: 5 TABLET ORAL at 12:39

## 2024-04-01 RX ADMIN — PRAVASTATIN SODIUM 40 MG: 40 TABLET ORAL at 16:39

## 2024-04-01 RX ADMIN — SERTRALINE HYDROCHLORIDE 25 MG: 25 TABLET ORAL at 08:55

## 2024-04-01 RX ADMIN — LORATADINE 10 MG: 10 TABLET ORAL at 08:55

## 2024-04-01 RX ADMIN — MIDODRINE HYDROCHLORIDE 5 MG: 5 TABLET ORAL at 16:39

## 2024-04-01 RX ADMIN — CYANOCOBALAMIN TAB 500 MCG 500 MCG: 500 TAB at 08:55

## 2024-04-01 RX ADMIN — MIDODRINE HYDROCHLORIDE 5 MG: 5 TABLET ORAL at 08:55

## 2024-04-01 NOTE — QUICK NOTE
PMR On-call quick note:    Patient noted to try multiple unsafe transfers Friday night prompting medicine to order 1:1 which was continued so far during day.  Call and discussion from nursing that patient has been doing well since without concerns for falls or unsafe behavior.  They feel patient may be safe off 1:1 at this time.  Will hold 1:1 but perform q15 safety checks.  If concerns for safety even with checks would resume 1:1.  No other reports of change in medical/neurologic status from staff.  He did have transient lower BP earlier today per chart review which I asked nursing to notify IM about.  This has since improved.

## 2024-04-01 NOTE — PROGRESS NOTES
04/01/24 1300   Pain Assessment   Pain Assessment Tool 0-10   Pain Score No Pain   Restrictions/Precautions   Precautions Aphasia;Aspiration;Bed/chair alarms;Cognitive;Fall Risk;Hard of hearing;Supervision on toilet/commode   Weight Bearing Restrictions No   ROM Restrictions No   Cognition   Overall Cognitive Status Impaired   Arousal/Participation Alert;Responsive;Cooperative   Attention Attends with cues to redirect   Orientation Level Oriented to person;Oriented to place;Disoriented to time;Disoriented to situation   Memory Decreased recall of precautions;Decreased recall of recent events;Decreased short term memory   Following Commands Follows one step commands with increased time or repetition   Roll Left and Right   Type of Assistance Needed Independent   Physical Assistance Level No physical assistance   Roll Left and Right CARE Score 6   Sit to Lying   Type of Assistance Needed Supervision   Physical Assistance Level No physical assistance   Comment bed rail   Sit to Lying CARE Score 4   Lying to Sitting on Side of Bed   Comment Pt OOB   Sit to Stand   Type of Assistance Needed Supervision   Physical Assistance Level No physical assistance   Comment visual and tactile cues fro sequence   Sit to Stand CARE Score 4   Bed-Chair Transfer   Type of Assistance Needed Incidental touching   Physical Assistance Level No physical assistance   Comment CGA RW; visual and tactile cues for sequence   Chair/Bed-to-Chair Transfer CARE Score 4   Car Transfer   Reason if not Attempted Environmental limitations   Car Transfer CARE Score 10   Walk 10 Feet   Type of Assistance Needed Incidental touching   Physical Assistance Level No physical assistance   Comment CGA RW; visual and tactile cues for direction   Walk 10 Feet CARE Score 4   Walk 50 Feet with Two Turns   Type of Assistance Needed Incidental touching   Physical Assistance Level No physical assistance   Comment CGA RW; visual and tactile cues for direction   Walk  50 Feet with Two Turns CARE Score 4   Walk 150 Feet   Type of Assistance Needed Incidental touching   Physical Assistance Level No physical assistance   Comment CGA RW; visual and tactile cues for direction   Walk 150 Feet CARE Score 4   Walking 10 Feet on Uneven Surfaces   Type of Assistance Needed Incidental touching   Physical Assistance Level No physical assistance   Comment RW, green foam   Walking 10 Feet on Uneven Surfaces CARE Score 4   Ambulation   Primary Mode of Locomotion Prior to Admission Walk   Distance Walked (feet) 200 ft   Assist Device Roller Walker   Gait Pattern Inconsistant Maria Victoria;Decreased foot clearance;Forward Flexion;Narrow ILIANA;Shuffle   Limitations Noted In Balance;Coordination;Device Management;Endurance;Posture;Safety;Speed;Strength   Provided Assistance with: Balance;Direction   Walk Assist Level Contact Guard   Findings CGA RW; visual and tactile cues for direction   Does the patient walk? 2. Yes   Wheel 50 Feet with Two Turns   Reason if not Attempted Activity not applicable   Wheel 50 Feet with Two Turns CARE Score 9   Wheel 150 Feet   Reason if not Attempted Activity not applicable   Wheel 150 Feet CARE Score 9   Wheelchair mobility   Findings N/A   Curb or Single Stair   Style negotiated Single stair   Type of Assistance Needed Incidental touching   Physical Assistance Level No physical assistance   Comment CGA up/down 14 steps with B/L HR; visual cues for sequencing   1 Step (Curb) CARE Score 4   4 Steps   Type of Assistance Needed Incidental touching   Physical Assistance Level No physical assistance   Comment CGA up/down 14 steps with B/L HR; visual cues for sequencing   4 Steps CARE Score 4   12 Steps   Type of Assistance Needed Incidental touching   Physical Assistance Level No physical assistance   Comment CGA up/down 14 steps with B/L HR; visual cues for sequencing   12 Steps CARE Score 4   Picking Up Object   Type of Assistance Needed Incidental touching   Physical  Assistance Level No physical assistance   Comment CGA  objects from floor and hand to PT; visual and tactile cueing at beginning for sequencing   Picking Up Object CARE Score 4   Toilet Transfer   Type of Assistance Needed Supervision   Physical Assistance Level No physical assistance   Comment RW, visual and tactile cueing for sequence   Toilet Transfer CARE Score 4   Therapeutic Interventions   Neuromuscular Re-Education picking up objects from floor   Other transfer training, gait training, stair training, ramp negotiation   Equipment Use   NuStep L2, 12 min, 0.56 mi   Assessment   Treatment Assessment Pt agreeable to PT this PM, received sitting upright in recliner. Pt continues to require frequent tactile and visual cueing for sequencing and safety with transfers, ambulation, and stairs. Pt displayed little carry over with hand placement and safety when transferring in and out of numerous different chairs/surfaces from varying heights and arm rest support. Will continue with current PT POC to improve deficits and promote return to PLOF.   Problem List Decreased strength;Decreased range of motion;Decreased endurance;Impaired balance;Decreased coordination;Decreased cognition;Decreased safety awareness;Impaired hearing   PT Barriers   Physical Impairment Decreased strength;Decreased range of motion;Decreased endurance;Impaired balance;Decreased mobility;Decreased coordination;Decreased cognition;Impaired judgement;Decreased safety awareness;Impaired hearing   Functional Limitation Wheelchair management;Walking;Transfers;Standing;Stair negotiation;Ramp negotiation;Car transfers   Plan   Treatment/Interventions Functional transfer training;LE strengthening/ROM;Therapeutic exercise;Endurance training;Cognitive reorientation;Patient/family training;Equipment eval/education;Bed mobility;Gait training   Progress Progressing toward goals   PT Therapy Minutes   PT Time In 1300   PT Time Out 1423   PT Total Time  (minutes) 83   PT Mode of treatment - Individual (minutes) 83   PT Mode of treatment - Concurrent (minutes) 0   PT Mode of treatment - Group (minutes) 0   PT Mode of treatment - Co-treat (minutes) 0   PT Mode of Treatment - Total time(minutes) 83 minutes   PT Cumulative Minutes 2237     Patient returned supine in bed, all needs in reach.  Alarm in place and activated.  Encouraged use of call bell, patient verbalizes understanding.

## 2024-04-01 NOTE — NUTRITION
04/01/24 1249   Biochemical Data,Medical Tests, and Procedures   Biochemical Data/Medical Tests/Procedures Lab values reviewed;Meds reviewed   Labs (Comment) No new labs   Meds (Comment) vitamin B12, MVI, senna   Nutrition-Focused Physical Exam   Nutrition-Focused Physical Exam Findings RN skin assessment reviewed;No edema documented;No skin issues documented   Medical-Related Concerns PMH reviewed   Adequacy of Intake   Nutrition Modality PO   Feeding Route   PO Independent  (with supervision and set up)   Current PO Intake   Current Diet Order Puree diet with double portions, NTL, extra sauce/gravy in a cup.   Nutrition Supplements Magic Cup;Mighty Shake  (Mightyshake TID. Magic Cup BID.)   Current Meal Intake 25-50%;50-75%;%   Intake Supplements 25-50%;50-75%   Estimated calorie intake compared to estimated need Anticipate at least low end nutrient needs are met as evidenced by weight stability x 1 week.   PES Statement   Problem Continue previous diagnosis  (improving)   Recommendations/Interventions   Malnutrition/BMI Present Yes  (as previously documented)   Adult BMI Classifications Underweight < 18.5   Summary Nutrition follow up assessment. Weights reviewed. Weight stable this past week. Continues to receive ST. Prescribed a Puree diet with double portions, NTL, extra sauce/gravy in a cup. Mightyshake TID. Magic Cup BID. Meal completions %. Nursing reports patient with good intake at 1/3 meals daily, picking at other 2 meals. Nursing also reports effort at ONS, intake often ~50%. Recommend initiating appetite stimulant. Continue nutrition interventions in place. RD continues following.   Interventions/Recommendations Continue current diet order;Supplement continue;Initiate appetite stimulant;Monitor I & O's   Education Assessment   Education Patient/caregiver not appropriate for education at this time   Patient Nutrition Goals   Goal Tolerate PO diet;Meet PO needs

## 2024-04-01 NOTE — PROGRESS NOTES
Progress Note - Thomas Chase 83 y.o. male MRN: [id number]    Unit/Bed#: -02 Encounter: [id number]        Subjective:   Patient seen and examined at bedside after reviewing the chart and discussing the case with the caring staff.      Patient examined at bedside.  Patient was on 1:1 on weekend due to safety, pt attempting to get up on own.  No longer needing 1:1 but is on safety checks q15 min.  Patient otherwise has no reported acute complaints.    Physical Exam   Vitals: Blood pressure 101/61, pulse 89, temperature 98.3 °F (36.8 °C), temperature source Temporal, resp. rate 17, height 6' (1.829 m), weight 57.9 kg (127 lb 10.3 oz), SpO2 98%.,Body mass index is 17.31 kg/m².  Constitutional: Patient in no acute distress.  HEENT: PERR, EOMI, MMM.  Cardiovascular: Normal rate and regular rhythm.    Pulmonary/Chest: Effort normal and breath sounds normal.   Abdomen: Soft, + BS, NT.    Assessment/Plan:  Thomas Chase is a(n) 83 y.o. year old male with left SDH and right SAH.     1.  Cardiac with hx of HTN, HLD/hypotension.  On pravastatin 40 mg daily (simvastatin nonformulary).  Noted to have low BP 3/10/24.  Patient's orthostatic vitals did not show significant drop.  Received IV fluids and started on midodrine.  Midodrine increased to 5 mg TID 3/21/24.  Continue to monitor.   2.  Allergic rhinitis.  Patient is on loratadine 10 mg daily.   3.  Depression/anxiety.  Patient is on Zoloft 25 mg daily.   4.  Insomnia.  Patient is on melatonin 6 mg at bedtime.   5.  Thrush.  Completed clotrimazole lozenges x 10 days on 3/21/24.   6.  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  Patient is mainly nonverbal.   7.  Urinary retention/UTI.  Urinary retention protocol.  Flomax decreased to 0.4 mg daily 3/12/2024 due to possible cause of hypotension.  Finley catheter placed 2/22/24, removed on 2/28/24, placed again 3/1/24, removed again 3/7/24 and reinserted 3/8/24.    Finley removed 3/22/24 and has been  voiding well on own.   8.  Dysphagia.  Speech therapy following.  Dysphagia 1 puureed with nectar thick liquid.  Video swallow study done on 3/11/2024.  9.  Vitamin B12 deficiency.  Patient started on vitamin B12 500 mcg daily.   10.  Pain and bowel management.  As per physiatrist.   11.  Intracranial hemorrhage.   Patient is receiving intensive PT OT ST as per physiatrist.     Anticipated discharge date:  TBD.   PCP:  RONY Diaz    The patient was discussed with Dr. Paz and he is in agreement with the above note.

## 2024-04-01 NOTE — PROGRESS NOTES
04/01/24 0700   Pain Assessment   Pain Assessment Tool 0-10   Pain Score No Pain   Restrictions/Precautions   Precautions Aphasia;Aspiration;Bed/chair alarms;Cognitive;Fall Risk;Hard of hearing;Impulsive;Supervision on toilet/commode   Weight Bearing Restrictions No   ROM Restrictions No   Eating   Type of Assistance Needed Supervision   Physical Assistance Level No physical assistance   Comment encouragement required to increase intake as pt with low appetite this AM, coughing noted after approx 25% of bites with cues to initiate double-swallow   Eating CARE Score 4   Oral Hygiene   Type of Assistance Needed Supervision   Physical Assistance Level No physical assistance   Comment visual cues for thoroughness   Oral Hygiene CARE Score 4   Grooming   Able To Comb/Brush Hair;Wash/Dry Face;Brush/Clean Teeth   Limitation Noted In Problem Solving;Safety;Sequencing   Shower/Bathe Self   Type of Assistance Needed Physical assistance   Physical Assistance Level 26%-50%   Comment assist for buttocks, bilat lower LE, perineal thoroughness; physical cues for sequencing   Shower/Bathe Self CARE Score 3   Bathing   Assessed Bath Style Sponge Bath   Anticipated D/C Bath Style Sponge Bath;Shower   Able to Gather/Transport No   Able to Wash/Rinse/Dry (body part) Left Arm;Right Arm;L Upper Leg;R Upper Leg;Chest;Abdomen   Limitations Noted in Balance;Endurance;Problem Solving;ROM;Safety;Sequencing   Positioning Seated;Standing   Upper Body Dressing   Type of Assistance Needed Physical assistance   Physical Assistance Level 25% or less   Comment assist to don new flannel shirt   Upper Body Dressing CARE Score 3   Lower Body Dressing   Type of Assistance Needed Physical assistance   Physical Assistance Level 76% or more   Comment able to pull clothing up over hips when standing   Lower Body Dressing CARE Score 2   Putting On/Taking Off Footwear   Type of Assistance Needed Physical assistance   Physical Assistance Level 76% or more    Comment able to present bilat feet to OT to doff/don footwear   Putting On/Taking Off Footwear CARE Score 2   Dressing/Undressing Clothing   Remove UB Clothes Pullover Shirt;Jacket   Don UB Clothes Pullover Shirt;Jacket   Remove LB Clothes Socks;Undergarment   Don LB Clothes Pants;Undergarment;Socks;TEDs   Limitations Noted In Balance;Endurance;Problem Solving;Safety;Sequencing;ROM   Positioning Supported Sit   Lying to Sitting on Side of Bed   Type of Assistance Needed Supervision   Physical Assistance Level No physical assistance   Lying to Sitting on Side of Bed CARE Score 4   Sit to Stand   Type of Assistance Needed Supervision   Physical Assistance Level No physical assistance   Comment visual and physical cues for hand placement   Sit to Stand CARE Score 4   Toileting Hygiene   Type of Assistance Needed Physical assistance   Physical Assistance Level 76% or more   Comment incontinent of urine upon entry into bathroom, did void again once on toilet   Toileting Hygiene CARE Score 2   Toileting   Able to Pull Clothing down no, up no.   Able to Manage Clothing Closures No   Manage Hygiene Bladder   Limitations Noted In Balance;Problem Solving;ROM;Safety;Sequencing   Adaptive Equipment Grab Bar   Toilet Transfer   Type of Assistance Needed Supervision   Physical Assistance Level No physical assistance   Comment visual and physical cues for safety   Toilet Transfer CARE Score 4   Toilet Transfer   Surface Assessed Raised Toilet   Transfer Technique Standard   Limitations Noted In Balance;Endurance;Problem Solving;Safety;Sequencing;LE Strength   Adaptive Equipment Grab Bar;Walker   Exercise Tools   Other Exercise Tool 1 card matching activity placing cards in matching pockets with R hand, 100% accuracy with this task and completed in quicker time frame than in all previous sessions   Cognition   Overall Cognitive Status Impaired   Arousal/Participation Alert;Responsive;Cooperative   Attention Attends with cues to  redirect   Orientation Level Oriented to person;Oriented to place  (cues required for month)   Memory Decreased short term memory;Decreased recall of recent events;Decreased recall of precautions   Following Commands Follows one step commands with increased time or repetition   Assessment   Treatment Assessment Pt agreeable to OT session this AM. Received lying supine in bed. ADL session completed; current LOF and details listed in respective sections. Pt is overall maxA toileting and LB dressing, Brayan bathing and UB dressing, supervision oral care and breakfast tray completion later in session. Visual and physical cues required for sequencing and safety awareness during all tasks, however pt continues to follow ADL routine set by OT very early in ARU stay effectively and initiates tasks fairly. Pt completed card matching activity after ADL before breakfast with good tolerance and accuracy. Pt's barriers to d/c include decreased strength throughout, decreased balance, impaired hearing, impaired cognition including safety awareness, problem solving, attention, comprehension, and expression, and decreased activity tolerance; all affect independence in self care and fxl transfers. Pt would benefit from continued skilled OT services in order to address listed barriers and prepare for safe d/c.   Prognosis Fair   Problem List Decreased strength;Decreased range of motion;Decreased endurance;Impaired balance;Decreased coordination;Decreased cognition;Decreased safety awareness;Impaired hearing   Plan   Treatment/Interventions ADL retraining;Functional transfer training;LE strengthening/ROM;Therapeutic exercise;Endurance training;Cognitive reorientation;Patient/family training;Equipment eval/education;Compensatory technique education;OT   Progress Progressing toward goals   OT Therapy Minutes   OT Time In 0700   OT Time Out 0830   OT Total Time (minutes) 90   OT Mode of treatment - Individual (minutes) 90

## 2024-04-01 NOTE — PROGRESS NOTES
PM&R PROGRESS NOTE:  Thomas Chase 83 y.o. male MRN: [id number]  Unit/Bed#: -02 Encounter: [id number]        **CHANGE IN REHAB DIAGNOSIS FROM PRE-ADMISSION SCREEN  Rehab Diagnosis: Impairment of mobility, safety, Activities of Daily Living (ADLs), and cognitive/communication skills due to Brain Dysfunction:  02.22  Traumatic, Closed Injury  Etiologic Diagnosis: L SDH, R SAH, L anterior hypodensity lateral temporal lobe    HPI: Thomas Chase is a 83 y.o. 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 on 2/13/2024 with increased weakness and mental status change for several days.  Unclear if patient had head trauma.  CT head showing a 3 mm left frontal subdural hemorrhage.  CT lumbar spine showing DJD.  Patient found to have dysphagia and was seen by speech therapy.  VFSS on 2/15/2024 cleared him for a puréed diet with thin liquids.  Patient was also started by psychiatry for depression and started on Zoloft 25 mg daily.  Patient sustained a rapid response and a unwitnessed fall with head trauma notable bump on head on 2/15/2024.  CT head showing a new small volume acute subarachnoid hemorrhage in the left frontal opercular region as well as new nondisplaced fracture of the zygomatic arch with soft tissue contusion, stable 3 mm subdural hemorrhage seen previously.  Patient transferred to [facility] for further evaluation.  Patient seen by neurology and neurosurgery.  CTA head and neck showing a left anterior hypodensity in the left anterior lateral temporal lobe with 2 punctate hyperdensities.  These represented possible nonhemorrhagic contusions or venous infarcts.  Less likely to represent ischemia or neoplasm.  Follow-up imaging recommended with CT head in 2-3 weeks.  CT  venogram showing patent dural venous sinuses.  Patient was cleared for DVT prophylaxis and started on Keppra for seizure prophylaxis.    Medical course complicated by suspected UTI based on UA and symptoms.  Patient was treated with antibiotics x 3 days.  After medical stabilization, patient found to have acute functional deficits from his baseline in mobility, self-care, speech swallow cognition therefore admitted to [facility] for acute inpatient rehabilitation.    SUBJECTIVE: Patient seen face to face.  Initiating some conversation today.  Reports he wants to go home.  Denies pain.  Reviewed unit rules to call for assistance when he wants to get up.  Patient forgetful and was getting up intermittently late on Friday.  No longer needs 1:1.  Will continue Q15 min frequent checks    ASSESSMENT: Stable, progressing      PLAN:    Rehabilitation  Functional deficits: Aphasia, dysphagia, impaired cognition, impaired balance, impulsivity, poor safety awareness impaired mobility, self care    Continue current rehabilitation plan of care to maximize function.  Expected Discharge:  Awaiting SNF bed vs home with hired assistance  His wife and primary care giver feels she may need help at home.  Ideally, 24/7 support would be good for her in the transition period.       DVT prophylaxis  Continue Lovenox - will NOT need at discharge      * Intracranial hemorrhage (HCC)-resolved as of 3/21/2024  Assessment & Plan  Initial presentation on 2/13/2024 with increased weakness in the legs, mental status changes for several days  CT head showing a 3 mm left frontal subdural hemorrhage  Rapid response 2/15/2024 with fall and head trauma  CT head showing a new small volume acute subarachnoid hemorrhage in the left frontal opercular region and new nondisplaced fracture of the zygomatic arch with soft tissue contusion.    Transferred to [facility]  CTA head and neck and and repeat CT head showing a left anterior hypodensity in the left  anterior lateral temporal lobe with 2 punctate hyperdensities.  Differential diagnosis nonhemorrhagic contusions or venous infarcts.  Patient seen by neurology and neurosurgery  Conservative management  Cleared for DVT prophylaxis  Started on Keppra for seizure prophylaxis  Repeat CT head scheduled on Monday, 3/4/2024 -personally reviewed  Radiology read as follows:  Improving left anterior temporal lobe contusions with resolving edema. Stable subacute subdural hemorrhage along the anterior left frontal convexity. Resolved subarachnoid and intraventricular hemorrhages. Unchanged minimal hyperdensity beneath the left craniotomy flap. Stable right parafalcine subdural hygroma. There is no new intra or extra-axial hemorrhage.  Follow-up with neurosurgery on 3/6/2024 virtually completed.  Recommendations as follows: Neurosurgery will sign off, patient will follow-up in 2 weeks with repeat CT head   CTH 3/16/24: No acute intracranial abnormality.Stable thin chronic left frontal subdural collection stable since 11/10/2023. Evaluation of the left temporal lobe is limited due to artifact from the cochlear implant but there may be mild developing encephalomalacia related to prior contusion.  Follow-up with neurosurgery and neurology in outpatient clinic (appointment is 3/21/24 with neurology in 3/22/2024 with neurosurgery)  Follow-up with Neurology in 3 months  Follow-up with Neurosurgery PRN    Behavior safety risk  Assessment & Plan  Doing well in ARC  Safety behavior plan going well.  Q15 min checks - at times impulsive getting up from bed, forgetful to use call bell, but does respond well to reminders.     Intermittently forgetful to use call bell, but usually does well with this.   Nursing and staff to provide close supervision near nursing station  Patient placed on 4 side rails (not as a restraint, patient and wife preference)  Monitor sleep-wake cycle - better overall     Patient demonstrating mild impulsivity in  acute care only   Fall x 2 in acute care  Falls at home in the past    Insomnia  Assessment & Plan  Continue melatonin to 9 mg QHS    Hematuria  Assessment & Plan  RESOVLED  Started 3/2 - 3/3/2024  Minor, and more likely related to irritation from Finley catheter  Irrigation as needed per urology  Asymptomatic  H&H stable    Headache  Assessment & Plan  Resolved  Continue Tylenol 650 mg PRN     Severe protein-calorie malnutrition (HCC)  Assessment & Plan  BMI 17.31  Nutrition following  Optimize oral intake  Supplements ordered  He is eating % of meals  Hydration is variable  Maintain IV  IVF PRN  SLP working towards free water protocol    Dysphagia  Assessment & Plan  VFSS completed 2/15/2024  Cleared for a puréed diet with nectar thick liquids  SLP to follow while at [facility]  Repeat VFSS 3/11/24: Mild oral dysphagia, Moderate oral-pharyngeal dysphagia  Recommendations:  Diet: Dysphagia 1 pureed   Liquids: Nectar   Patient doing very well with oral food eating % of all of his meals.  Also gaining weight.  At times has difficulty with fluid intake due to nectar thick liquids and dysphagia.  Repeat swallow study recommended in 3-4 weeks  Supplement with IV fluids if needed  SLP also working towards a free water nectar thick protocol with oral care prior  Possible future consideration for PEG       Low BP  Assessment & Plan  Still low at times, but asymptomatic.  His normal appears to be SBP   Continue Midodrine 5 mg TID (3/21/24) per primary service.  Continue compression stockings during day.  Adequate hydration    BP lower since 3/10/2024  Compression stockings and abdominal binder ordered  Internal medicine ordering IV fluid bolus.  I continued IVF x 1 L which was completed  BP continues to be very low especially in standing.   Labs reviewed - nothing to explain low BP        Urinary retention  Assessment & Plan  Finley catheter removed on [facility]  Trial of void underway  Mixed pattern  Unfortunately  required multiple straight catheterizations  Finley catheter replaced 3/1/24 per urology  Flomax reduced to 0.4 mg daily due to hypotension   Trial of void 3/7/2024 - failed this  Finley re-inserted 3/8/24  Patient likely to require longer time prior to removal of Finley catheter -recommend removal in 2 weeks around 3/22/2024  Finley removed and Trial of void started 3/22/2024  Slow in the beginning however after good BM patient was starting to void more with lower PVRs.  Having both continent and incontinent episodes -mixed pattern  Timed toilet frequently  Bladder scans now for lack of void over shift       Constipation due to neurogenic bowel  Assessment & Plan  Patient started on more aggressive bowel program    Hearing loss  Assessment & Plan  Chronic hearing loss  Patient status post cochlear implant  MRI contraindicated    Hypercholesterolemia  Assessment & Plan  Continue simvastatin 20 mg daily (home dose)    UTI (urinary tract infection)-resolved as of 2/21/2024  Assessment & Plan  RESOLVED   Suspected UTI based on UA and symptoms in acute care  Completed 3 days IV antibiotics    In ARC:  UA was ordered by IM : equivocal - neg.   Culture was done by internal medicine.  Enterococcus faecalis  Likely colonized.  Asymptomatic.  Over the weekend, however, Dr. Paz did decide to initiate treatment with Levaquin x 5 days.          Appreciate IM consultants medical co-management.  Labs, medications, and imaging personally reviewed.      ROS:  A ten point review of systems was completed on 04/01/24 and pertinent positives are listed in subjective section. All other systems reviewed were negative.       OBJECTIVE:   /61   Pulse 89   Temp 98.3 °F (36.8 °C) (Temporal)   Resp 17   Ht 6' (1.829 m)   Wt 57.9 kg (127 lb 10.3 oz)   SpO2 98%   BMI 17.31 kg/m²       Physical Exam  Vitals and nursing note reviewed.   Constitutional:       General: He is not in acute distress.  HENT:      Head: Normocephalic and  atraumatic.      Ears:      Comments: Impaired hearing     Nose: Nose normal.      Mouth/Throat:      Mouth: Mucous membranes are moist.   Eyes:      Conjunctiva/sclera: Conjunctivae normal.   Cardiovascular:      Rate and Rhythm: Normal rate and regular rhythm.      Pulses: Normal pulses.   Pulmonary:      Effort: Pulmonary effort is normal.      Breath sounds: Normal breath sounds. No wheezing or rales.   Abdominal:      General: Bowel sounds are normal. There is no distension.      Palpations: Abdomen is soft.      Tenderness: There is no abdominal tenderness.   Musculoskeletal:         General: No swelling.      Cervical back: Neck supple.   Skin:     General: Skin is warm.   Neurological:      Mental Status: He is alert.      Comments: Aphasic  Initiating some conversation today    Psychiatric:         Mood and Affect: Mood normal.          Lab Results   Component Value Date    WBC 5.63 03/25/2024    HGB 11.7 (L) 03/25/2024    HCT 37.0 03/25/2024    MCV 98 03/25/2024     (H) 03/25/2024     Lab Results   Component Value Date    SODIUM 139 03/25/2024    K 4.2 03/25/2024     03/25/2024    CO2 27 03/25/2024    BUN 16 03/25/2024    CREATININE 0.58 (L) 03/25/2024    GLUC 82 03/25/2024    CALCIUM 8.8 03/25/2024     Lab Results   Component Value Date    INR 1.02 06/02/2022    INR 1.10 05/29/2022    INR 1.04 05/28/2022    PROTIME 13.0 06/02/2022    PROTIME 13.7 05/29/2022    PROTIME 13.2 05/28/2022           Current Facility-Administered Medications:     acetaminophen (TYLENOL) tablet 650 mg, 650 mg, Oral, Q6H PRN, Daniel Clayton MD, 650 mg at 03/31/24 2130    bisacodyl (DULCOLAX) rectal suppository 10 mg, 10 mg, Rectal, Daily PRN, Darwin Paz MD, 10 mg at 03/22/24 1327    cyanocobalamin (VITAMIN B-12) tablet 500 mcg, 500 mcg, Oral, Daily, Darwin Paz MD, 500 mcg at 04/01/24 0855    enoxaparin (LOVENOX) subcutaneous injection 40 mg, 40 mg, Subcutaneous, Q24H [facility], Alexandrea Lugo MD, 40 mg at  04/01/24 0855    lactulose (CHRONULAC) oral solution 20 g, 20 g, Oral, BID PRN, Darwin Paz MD, 20 g at 03/28/24 1131    lactulose (CHRONULAC) oral solution 20 g, 20 g, Oral, Once, Alexandrea Lugo MD    loratadine (CLARITIN) tablet 10 mg, 10 mg, Oral, Daily, Amada L Dagnall, PA-C, 10 mg at 04/01/24 0855    meclizine (ANTIVERT) tablet 25 mg, 25 mg, Oral, Q8H PRN, Amada L Dagnall, PA-C    melatonin tablet 9 mg, 9 mg, Oral, HS, Alexandrea Lugo MD, 9 mg at 03/31/24 2130    midodrine (PROAMATINE) tablet 5 mg, 5 mg, Oral, TID AC, Amada L Dagnall, PA-C, 5 mg at 04/01/24 0855    multivitamin-minerals (CENTRUM) tablet 1 tablet, 1 tablet, Oral, Daily, Amada L Dagnall, PA-C, 1 tablet at 04/01/24 0855    polyethylene glycol (MIRALAX) packet 17 g, 17 g, Oral, Daily PRN, Ashley Depadua, MD, 17 g at 03/21/24 1655    pravastatin (PRAVACHOL) tablet 40 mg, 40 mg, Oral, Daily With Dinner, Amada L Dagnall, PA-C, 40 mg at 03/31/24 1648    senna (SENOKOT) tablet 8.6 mg, 1 tablet, Oral, HS, Alexandrea Lugo MD, 8.6 mg at 03/31/24 2130    sertraline (ZOLOFT) tablet 25 mg, 25 mg, Oral, Daily, Amada L Dagnall, PA-C, 25 mg at 04/01/24 0855    tamsulosin (FLOMAX) capsule 0.4 mg, 0.4 mg, Oral, Daily With Dinner, Darwin Paz MD, 0.4 mg at 03/31/24 1648    Past Medical History:   Diagnosis Date    Arthritis     Encounter for general adult medical examination without abnormal findings 03/20/2019    Fall 11/03/2022    Hyperlipidemia     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    Macular degeneration, wet (HCC)     Urinary retention 06/02/2022       Patient Active Problem List    Diagnosis Date Noted    Behavior safety risk 02/20/2024    Insomnia 03/21/2024    Abnormal CT of the head 03/21/2024    Hematuria 03/04/2024    Headache 02/20/2024    Bilateral lower extremity weakness 02/17/2024    Abnormal CT scan, lumbar spine 02/15/2024    Severe protein-calorie malnutrition (HCC) 02/14/2024    Debility 02/13/2024     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    Dysphagia 11/21/2023    Macular degeneration, age related 02/27/2023    H/O traumatic subdural hematoma 02/27/2023    Sensorineural hearing loss (SNHL), bilateral 08/18/2022    Balance disorder 06/28/2022    Abnormal echocardiogram 06/27/2022    Right bundle-branch block 06/27/2022    Low BP 06/19/2022    Diastolic dysfunction 06/19/2022    EKG abnormalities 06/19/2022    Constipation due to neurogenic bowel 06/02/2022    Urinary retention 06/02/2022    SDH (subdural hematoma) (formerly [facility]) 05/27/2022    Primary osteoarthritis of left knee 05/17/2022    Hygroma 04/26/2022    Right hand pain     Carpal tunnel syndrome on right     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    Overweight (BMI 25.0-29.9) 01/27/2021    Negative depression screening 09/26/2019    Hypercholesterolemia 07/12/2018    Borderline hypertension 07/12/2018    Hearing loss 04/08/2009          Alexandrea Lugo MD    I have spent a total time of 35 minutes on 04/01/24 in caring for this patient including Impressions, Documenting in the medical record, and Communicating with other healthcare professionals .      ** Please Note:  voice to text software may have been used in the creation of this document. Although proof errors in transcription or interpretation are a potential of such software**

## 2024-04-01 NOTE — PROGRESS NOTES
"   04/01/24 1100   Pain Assessment   Pain Assessment Tool 0-10   Pain Score No Pain   Pain Rating: FLACC (Activity) - Face 0   Pain Rating: FLACC (Activity) - Legs 0   Pain Rating: FLACC (Activity) - Activity 0   Pain Rating: FLACC (Activity) - Cry 0   Pain Rating: FLACC (Activity) - Consolability 0   Score: FLACC (Activity) 0   Restrictions/Precautions   Precautions Aphasia;Aspiration;Bed/chair alarms;Cognitive;Fall Risk;Hard of hearing;Supervision on toilet/commode   Comprehension   Assist Devices Hearing Aid   Comprehension (FIM) 4 - Understands basic info/conversation 75-90% of time   Expression   Expression (FIM) 3 - Expresses basic info/needs 50-74% of time   Social Interaction   Social Interaction (FIM) 5 - Interacts appropriately with others 90% of time   Problem Solving   Problem solving (FIM) 3 - Solves basic problmes 50-74% of time   Memory   Memory (FIM) 3 - Recognizes, recalls/performs 50-74%   Speech/Language/Cognition Assessmetn   Treatment Assessment independently requested \"have to go to the bathroom\". He did complete transfer and urinated ( min cue to reach back towards commode. Familiar low tech AAC board in room completed with written direction of 1 item at a time F9-12 items @ 30% silvia increased to 100% mod-max cue.   Eating   Type of Assistance Needed Supervision   Physical Assistance Level No physical assistance   Eating CARE Score 4   Swallow Assessment   Swallow Treatment Assessment water via single controlled straw sips cough 7/15 trials. Completed effortful swallow inconsistently. Airflow sustained blowing out up to 1 second ( visual used for blowing a tissue) unable to expand beyond 1 second. Unable to follow direction to complete spirometer or blowing out bubbles into a cup.   SLP Therapy Minutes   SLP Time In 1045   SLP Time Out 1145   SLP Total Time (minutes) 60   SLP Mode of treatment - Individual (minutes) 60   SLP Mode of treatment - Concurrent (minutes) 0   SLP Mode of treatment - " Group (minutes) 0   SLP Mode of treatment - Co-treat (minutes) 0   SLP Mode of Treatment - Total time(minutes) 60 minutes   SLP Cumulative Minutes 965

## 2024-04-01 NOTE — PLAN OF CARE
Problem: PAIN - ADULT  Goal: Verbalizes/displays adequate comfort level or baseline comfort level  Description: Interventions:  - Encourage patient to monitor pain and request assistance  - Assess pain using appropriate pain scale  - Administer analgesics based on type and severity of pain and evaluate response  - Implement non-pharmacological measures as appropriate and evaluate response  - Consider cultural and social influences on pain and pain management  - Notify physician/advanced practitioner if interventions unsuccessful or patient reports new pain  Outcome: Progressing     Problem: INFECTION - ADULT  Goal: Absence or prevention of progression during hospitalization  Description: INTERVENTIONS:  - Assess and monitor for signs and symptoms of infection  - Monitor lab/diagnostic results  - Monitor all insertion sites, i.e. indwelling lines, tubes, and drains  - Monitor endotracheal if appropriate and nasal secretions for changes in amount and color  - [place] appropriate cooling/warming therapies per order  - Administer medications as ordered  - Instruct and encourage patient and family to use good hand hygiene technique  - Identify and instruct in appropriate isolation precautions for identified infection/condition  Outcome: Progressing  Goal: Absence of fever/infection during neutropenic period  Description: INTERVENTIONS:  - Monitor WBC    Outcome: Progressing     Problem: SAFETY ADULT  Goal: Patient will remain free of falls  Description: INTERVENTIONS:  - Educate patient/family on patient safety including physical limitations  - Instruct patient to call for assistance with activity   - Consult OT/PT to assist with strengthening/mobility   - Keep Call bell within reach  - Keep bed low and locked with side rails adjusted as appropriate  - Keep care items and personal belongings within reach  - Initiate and maintain comfort rounds  - Make Fall Risk Sign visible to staff  - Apply yellow socks and bracelet  for high fall risk patients  - Consider moving patient to room near nurses station  Outcome: Progressing  Goal: Maintain or return to baseline ADL function  Description: INTERVENTIONS:  -  Assess patient's ability to carry out ADLs; assess patient's baseline for ADL function and identify physical deficits which impact ability to perform ADLs (bathing, care of mouth/teeth, toileting, grooming, dressing, etc.)  - Assess/evaluate cause of self-care deficits   - Assess range of motion  - Assess patient's mobility; develop plan if impaired  - Assess patient's need for assistive devices and provide as appropriate  - Encourage maximum independence but intervene and supervise when necessary  - Involve family in performance of ADLs  - Assess for home care needs following discharge   - Consider OT consult to assist with ADL evaluation and planning for discharge  - Provide patient education as appropriate  Outcome: Progressing       Problem: DISCHARGE PLANNING  Goal: Discharge to home or other facility with appropriate resources  Description: INTERVENTIONS:  - Identify barriers to discharge w/patient and caregiver  - Arrange for needed discharge resources and transportation as appropriate  - Identify discharge learning needs (meds, wound care, etc.)  - Arrange for interpretive services to assist at discharge as needed  - Refer to Case Management Department for coordinating discharge planning if the patient needs post-hospital services based on physician/advanced practitioner order or complex needs related to functional status, cognitive ability, or social support system  Outcome: Progressing     Problem: Prexisting or High Potential for Compromised Skin Integrity  Goal: Skin integrity is maintained or improved  Description: INTERVENTIONS:  - Identify patients at risk for skin breakdown  - Assess and monitor skin integrity  - Assess and monitor nutrition and hydration status  - Monitor labs   - Assess for incontinence   - Turn  and reposition patient  - Assist with mobility/ambulation  - Relieve pressure over bony prominences  - Avoid friction and shearing  - Provide appropriate hygiene as needed including keeping skin clean and dry  - Evaluate need for skin moisturizer/barrier cream  - Collaborate with interdisciplinary team   - Patient/family teaching  - Consider wound care consult   Outcome: Progressing     Problem: Nutrition/Hydration-ADULT  Goal: Nutrient/Hydration intake appropriate for improving, restoring or maintaining nutritional needs  Description: Monitor and assess patient's nutrition/hydration status for malnutrition. Collaborate with interdisciplinary team and initiate plan and interventions as ordered.  Monitor patient's weight and dietary intake as ordered or per policy. Utilize nutrition screening tool and intervene as necessary. Determine patient's food preferences and provide high-protein, high-caloric foods as appropriate.     INTERVENTIONS:  - Monitor oral intake, urinary output, labs, and treatment plans  - Assess nutrition and hydration status and recommend course of action  - Evaluate amount of meals eaten  - Assist patient with eating if necessary   - Allow adequate time for meals  - Recommend/ encourage appropriate diets, oral nutritional supplements, and vitamin/mineral supplements  - Provide specific nutrition/hydration education as appropriate  - Include patient/family/caregiver in decisions related to nutrition  Outcome: Progressing

## 2024-04-02 LAB
ALBUMIN SERPL BCP-MCNC: 3.3 G/DL (ref 3.5–5)
ALP SERPL-CCNC: 93 U/L (ref 34–104)
ALT SERPL W P-5'-P-CCNC: 22 U/L (ref 7–52)
ANION GAP SERPL CALCULATED.3IONS-SCNC: 5 MMOL/L (ref 4–13)
AST SERPL W P-5'-P-CCNC: 17 U/L (ref 13–39)
BASOPHILS # BLD AUTO: 0.03 THOUSANDS/ÂΜL (ref 0–0.1)
BASOPHILS NFR BLD AUTO: 1 % (ref 0–1)
BILIRUB SERPL-MCNC: 0.28 MG/DL (ref 0.2–1)
BUN SERPL-MCNC: 16 MG/DL (ref 5–25)
CALCIUM ALBUM COR SERPL-MCNC: 9.5 MG/DL (ref 8.3–10.1)
CALCIUM SERPL-MCNC: 8.9 MG/DL (ref 8.4–10.2)
CHLORIDE SERPL-SCNC: 101 MMOL/L (ref 96–108)
CO2 SERPL-SCNC: 32 MMOL/L (ref 21–32)
CREAT SERPL-MCNC: 0.69 MG/DL (ref 0.6–1.3)
EOSINOPHIL # BLD AUTO: 0.16 THOUSAND/ÂΜL (ref 0–0.61)
EOSINOPHIL NFR BLD AUTO: 3 % (ref 0–6)
ERYTHROCYTE [DISTWIDTH] IN BLOOD BY AUTOMATED COUNT: 14.1 % (ref 11.6–15.1)
GFR SERPL CREATININE-BSD FRML MDRD: 87 ML/MIN/1.73SQ M
GLUCOSE P FAST SERPL-MCNC: 80 MG/DL (ref 65–99)
GLUCOSE SERPL-MCNC: 80 MG/DL (ref 65–140)
HCT VFR BLD AUTO: 39.3 % (ref 36.5–49.3)
HGB BLD-MCNC: 12.3 G/DL (ref 12–17)
IMM GRANULOCYTES # BLD AUTO: 0.05 THOUSAND/UL (ref 0–0.2)
IMM GRANULOCYTES NFR BLD AUTO: 1 % (ref 0–2)
LYMPHOCYTES # BLD AUTO: 1.34 THOUSANDS/ÂΜL (ref 0.6–4.47)
LYMPHOCYTES NFR BLD AUTO: 23 % (ref 14–44)
MCH RBC QN AUTO: 30.8 PG (ref 26.8–34.3)
MCHC RBC AUTO-ENTMCNC: 31.3 G/DL (ref 31.4–37.4)
MCV RBC AUTO: 99 FL (ref 82–98)
MONOCYTES # BLD AUTO: 0.62 THOUSAND/ÂΜL (ref 0.17–1.22)
MONOCYTES NFR BLD AUTO: 11 % (ref 4–12)
NEUTROPHILS # BLD AUTO: 3.73 THOUSANDS/ÂΜL (ref 1.85–7.62)
NEUTS SEG NFR BLD AUTO: 61 % (ref 43–75)
NRBC BLD AUTO-RTO: 0 /100 WBCS
PLATELET # BLD AUTO: 392 THOUSANDS/UL (ref 149–390)
PMV BLD AUTO: 10 FL (ref 8.9–12.7)
POTASSIUM SERPL-SCNC: 4.2 MMOL/L (ref 3.5–5.3)
PROT SERPL-MCNC: 6.5 G/DL (ref 6.4–8.4)
RBC # BLD AUTO: 3.99 MILLION/UL (ref 3.88–5.62)
SODIUM SERPL-SCNC: 138 MMOL/L (ref 135–147)
WBC # BLD AUTO: 5.93 THOUSAND/UL (ref 4.31–10.16)

## 2024-04-02 PROCEDURE — 99232 SBSQ HOSP IP/OBS MODERATE 35: CPT | Performed by: PHYSICAL MEDICINE & REHABILITATION

## 2024-04-02 PROCEDURE — 85025 COMPLETE CBC W/AUTO DIFF WBC: CPT | Performed by: PHYSICAL MEDICINE & REHABILITATION

## 2024-04-02 PROCEDURE — 97537 COMMUNITY/WORK REINTEGRATION: CPT

## 2024-04-02 PROCEDURE — 97535 SELF CARE MNGMENT TRAINING: CPT

## 2024-04-02 PROCEDURE — 97110 THERAPEUTIC EXERCISES: CPT

## 2024-04-02 PROCEDURE — 97530 THERAPEUTIC ACTIVITIES: CPT

## 2024-04-02 PROCEDURE — 80053 COMPREHEN METABOLIC PANEL: CPT | Performed by: PHYSICAL MEDICINE & REHABILITATION

## 2024-04-02 PROCEDURE — 92526 ORAL FUNCTION THERAPY: CPT | Performed by: NURSE PRACTITIONER

## 2024-04-02 PROCEDURE — 97116 GAIT TRAINING THERAPY: CPT

## 2024-04-02 RX ADMIN — PRAVASTATIN SODIUM 40 MG: 40 TABLET ORAL at 17:17

## 2024-04-02 RX ADMIN — ACETAMINOPHEN 650 MG: 325 TABLET ORAL at 21:36

## 2024-04-02 RX ADMIN — Medication 9 MG: at 21:36

## 2024-04-02 RX ADMIN — MIDODRINE HYDROCHLORIDE 5 MG: 5 TABLET ORAL at 11:53

## 2024-04-02 RX ADMIN — LORATADINE 10 MG: 10 TABLET ORAL at 08:17

## 2024-04-02 RX ADMIN — MIDODRINE HYDROCHLORIDE 5 MG: 5 TABLET ORAL at 17:18

## 2024-04-02 RX ADMIN — Medication 1 TABLET: at 08:17

## 2024-04-02 RX ADMIN — TAMSULOSIN HYDROCHLORIDE 0.4 MG: 0.4 CAPSULE ORAL at 17:18

## 2024-04-02 RX ADMIN — CYANOCOBALAMIN TAB 500 MCG 500 MCG: 500 TAB at 08:17

## 2024-04-02 RX ADMIN — SERTRALINE HYDROCHLORIDE 25 MG: 25 TABLET ORAL at 08:17

## 2024-04-02 RX ADMIN — ENOXAPARIN SODIUM 40 MG: 40 INJECTION SUBCUTANEOUS at 08:09

## 2024-04-02 RX ADMIN — MIDODRINE HYDROCHLORIDE 5 MG: 5 TABLET ORAL at 08:16

## 2024-04-02 NOTE — PROGRESS NOTES
PM&R PROGRESS NOTE:  Thomas Chase 83 y.o. male MRN: [id number]  Unit/Bed#: -02 Encounter: [id number]        **CHANGE IN REHAB DIAGNOSIS FROM PRE-ADMISSION SCREEN  Rehab Diagnosis: Impairment of mobility, safety, Activities of Daily Living (ADLs), and cognitive/communication skills due to Brain Dysfunction:  02.22  Traumatic, Closed Injury  Etiologic Diagnosis: L SDH, R SAH, L anterior hypodensity lateral temporal lobe    HPI: Thomas Chase is a 83 y.o. 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  Unclear if patient had head trauma.  CT head showing a 3 mm left frontal subdural hemorrhage.  CT lumbar spine showing DJD.  Patient found to have dysphagia and was seen by speech therapy.  VFSS on 2/15/2024 cleared him for a puréed diet with thin liquids.  Patient was also started by psychiatry for depression and started on Zoloft 25 mg daily.  Patient sustained a rapid response and a unwitnessed fall with head trauma notable bump on head on 2/15/2024.  CT head showing a new small volume acute subarachnoid hemorrhage in the left frontal opercular region as well as new nondisplaced fracture of the zygomatic arch with soft tissue contusion, stable 3 mm subdural hemorrhage seen previously.  Patient transferred to [facility] for further evaluation.  Patient seen by neurology and neurosurgery.  CTA head and neck showing a left anterior hypodensity in the left anterior lateral temporal lobe with 2 punctate hyperdensities.  These represented possible nonhemorrhagic contusions or venous infarcts.  Less likely to represent ischemia or neoplasm.  Follow-up imaging recommended with CT head in 2-3 weeks.  CT  venogram showing patent dural venous sinuses.  Patient was cleared for DVT prophylaxis and started on Keppra for seizure prophylaxis.    Medical course complicated by suspected UTI based on UA and symptoms.  Patient was treated with antibiotics x 3 days.  After medical stabilization, patient found to have acute functional deficits from his baseline in mobility, self-care, speech swallow cognition therefore admitted to [facility] for acute inpatient rehabilitation.    SUBJECTIVE: Patient seen face to face.  No acute issues overnight.  Patient feeling happy about eventual discharge home.  Denies headache.      ASSESSMENT: Stable, progressing      PLAN:    Rehabilitation  Functional deficits: Aphasia, dysphagia, impaired cognition, impaired balance, impulsivity, poor safety awareness impaired mobility, self care    Continue current rehabilitation plan of care to maximize function.  Expected Discharge: Home with home care and hired assistance.   Arrangements for assistance in home being made by family and case management       DVT prophylaxis  Continue Lovenox - will NOT need at discharge      * Intracranial hemorrhage (HCC)-resolved as of 3/21/2024  Assessment & Plan  Initial presentation on 2/13/2024 with increased weakness in the legs, mental status changes for several days  CT head showing a 3 mm left frontal subdural hemorrhage  Rapid response 2/15/2024 with fall and head trauma  CT head showing a new small volume acute subarachnoid hemorrhage in the left frontal opercular region and new nondisplaced fracture of the zygomatic arch with soft tissue contusion.    Transferred to [facility]  CTA head and neck and and repeat CT head showing a left anterior hypodensity in the left anterior lateral temporal lobe with 2 punctate hyperdensities.  Differential diagnosis nonhemorrhagic contusions or venous infarcts.  Patient seen by neurology and neurosurgery  Conservative management  Cleared for DVT prophylaxis  Started on  Keppra for seizure prophylaxis  Repeat CT head scheduled on Monday, 3/4/2024 -personally reviewed  Radiology read as follows:  Improving left anterior temporal lobe contusions with resolving edema. Stable subacute subdural hemorrhage along the anterior left frontal convexity. Resolved subarachnoid and intraventricular hemorrhages. Unchanged minimal hyperdensity beneath the left craniotomy flap. Stable right parafalcine subdural hygroma. There is no new intra or extra-axial hemorrhage.  Follow-up with neurosurgery on 3/6/2024 virtually completed.  Recommendations as follows: Neurosurgery will sign off, patient will follow-up in 2 weeks with repeat CT head   CTH 3/16/24: No acute intracranial abnormality.Stable thin chronic left frontal subdural collection stable since 11/10/2023. Evaluation of the left temporal lobe is limited due to artifact from the cochlear implant but there may be mild developing encephalomalacia related to prior contusion.  Follow-up with neurosurgery and neurology in outpatient clinic (appointment is 3/21/24 with neurology in 3/22/2024 with neurosurgery)  Follow-up with Neurology in 3 months  Follow-up with Neurosurgery PRN    Behavior safety risk  Assessment & Plan  Doing well in ARC  Safety behavior plan going well.  Q15 min checks - at times impulsive getting up from bed, forgetful to use call bell, but does respond well to reminders.     Intermittently forgetful to use call bell, but usually does well with this.   Nursing and staff to provide close supervision near nursing station  Patient placed on 4 side rails (not as a restraint, patient and wife preference)  Monitor sleep-wake cycle - better overall     Patient demonstrating mild impulsivity in acute care only   Fall x 2 in acute care  Falls at home in the past    Insomnia  Assessment & Plan  Continue melatonin to 9 mg QHS    Hematuria  Assessment & Plan  RESOVLED  Started 3/2 - 3/3/2024  Minor, and more likely related to irritation  from Finley catheter  Irrigation as needed per urology  Asymptomatic  H&H stable    Headache  Assessment & Plan  Resolved  Continue Tylenol 650 mg PRN     Severe protein-calorie malnutrition (HCC)  Assessment & Plan  BMI 17.31  Nutrition following  Optimize oral intake  Supplements ordered  He is eating % of meals  Hydration is variable  Maintain IV  IVF PRN  SLP working towards free water protocol    Dysphagia  Assessment & Plan  VFSS completed 2/15/2024  Cleared for a puréed diet with nectar thick liquids  SLP to follow while at [facility]  Repeat VFSS 3/11/24: Mild oral dysphagia, Moderate oral-pharyngeal dysphagia  Recommendations:  Diet: Dysphagia 1 pureed   Liquids: Nectar   Patient doing very well with oral food eating % of all of his meals.  Also gaining weight.  At times has difficulty with fluid intake due to nectar thick liquids and dysphagia.  Repeat swallow study recommended in 3-4 weeks  Supplement with IV fluids if needed  SLP also working towards a free water nectar thick protocol with oral care prior  Possible future consideration for PEG       Low BP  Assessment & Plan  Still low at times, but asymptomatic.  His normal appears to be SBP   Continue Midodrine 5 mg TID (3/21/24) per primary service.  Continue compression stockings during day.  Adequate hydration    BP lower since 3/10/2024  Compression stockings and abdominal binder ordered  Internal medicine ordering IV fluid bolus.  I continued IVF x 1 L which was completed  BP continues to be very low especially in standing.   Labs reviewed - nothing to explain low BP        Urinary retention  Assessment & Plan  Finley catheter removed on [facility]  Trial of void underway  Mixed pattern  Unfortunately required multiple straight catheterizations  Finley catheter replaced 3/1/24 per urology  Flomax reduced to 0.4 mg daily due to hypotension   Trial of void 3/7/2024 - failed this  Finley re-inserted 3/8/24  Patient likely to require longer time prior  to removal of Finley catheter -recommend removal in 2 weeks around 3/22/2024  Finley removed and Trial of void started 3/22/2024  Slow in the beginning however after good BM patient was starting to void more with lower PVRs.  Having both continent and incontinent episodes -mixed pattern  Timed toilet frequently  Bladder scans now for lack of void over shift       Constipation due to neurogenic bowel  Assessment & Plan  Patient started on more aggressive bowel program    Hearing loss  Assessment & Plan  Chronic hearing loss  Patient status post cochlear implant  MRI contraindicated    Hypercholesterolemia  Assessment & Plan  Continue simvastatin 20 mg daily (home dose)    UTI (urinary tract infection)-resolved as of 2/21/2024  Assessment & Plan  RESOLVED   Suspected UTI based on UA and symptoms in acute care  Completed 3 days IV antibiotics    In ARC:  UA was ordered by IM : equivocal - neg.   Culture was done by internal medicine.  Enterococcus faecalis  Likely colonized.  Asymptomatic.  Over the weekend, however, Dr. Paz did decide to initiate treatment with Levaquin x 5 days.          Appreciate IM consultants medical co-management.  Labs, medications, and imaging personally reviewed.      ROS:  A ten point review of systems was completed on 04/02/24 and pertinent positives are listed in subjective section. All other systems reviewed were negative.       OBJECTIVE:   BP 90/64 (BP Location: Right arm)   Pulse 61   Temp (!) 97.4 °F (36.3 °C) (Temporal)   Resp 14   Ht 6' (1.829 m)   Wt 57.9 kg (127 lb 10.3 oz)   SpO2 93%   BMI 17.31 kg/m²       Physical Exam  Vitals and nursing note reviewed.   Constitutional:       General: He is not in acute distress.     Appearance: He is well-developed.   HENT:      Head: Normocephalic.      Ears:      Comments: Impaired hearing      Nose: Nose normal.   Eyes:      Conjunctiva/sclera: Conjunctivae normal.   Cardiovascular:      Pulses: Normal pulses.   Pulmonary:       Effort: Pulmonary effort is normal.      Breath sounds: No wheezing.   Abdominal:      General: There is no distension.      Palpations: Abdomen is soft.   Musculoskeletal:         General: No swelling.      Cervical back: Neck supple.   Skin:     General: Skin is warm.   Neurological:      Mental Status: He is alert.      Comments: Aphasic but can express certain words and phrases   Comprehension overall intact    Psychiatric:         Mood and Affect: Mood normal.          Lab Results   Component Value Date    WBC 5.93 04/02/2024    HGB 12.3 04/02/2024    HCT 39.3 04/02/2024    MCV 99 (H) 04/02/2024     (H) 04/02/2024     Lab Results   Component Value Date    SODIUM 138 04/02/2024    K 4.2 04/02/2024     04/02/2024    CO2 32 04/02/2024    BUN 16 04/02/2024    CREATININE 0.69 04/02/2024    GLUC 80 04/02/2024    CALCIUM 8.9 04/02/2024     Lab Results   Component Value Date    INR 1.02 06/02/2022    INR 1.10 05/29/2022    INR 1.04 05/28/2022    PROTIME 13.0 06/02/2022    PROTIME 13.7 05/29/2022    PROTIME 13.2 05/28/2022           Current Facility-Administered Medications:     acetaminophen (TYLENOL) tablet 650 mg, 650 mg, Oral, Q6H PRN, Daniel Clayton MD, 650 mg at 04/01/24 2117    bisacodyl (DULCOLAX) rectal suppository 10 mg, 10 mg, Rectal, Daily PRN, Darwin Paz MD, 10 mg at 03/22/24 1327    cyanocobalamin (VITAMIN B-12) tablet 500 mcg, 500 mcg, Oral, Daily, Darwin Paz MD, 500 mcg at 04/02/24 0817    enoxaparin (LOVENOX) subcutaneous injection 40 mg, 40 mg, Subcutaneous, Q24H JEANNIE, Alexandrea Lugo MD, 40 mg at 04/02/24 0809    lactulose (CHRONULAC) oral solution 20 g, 20 g, Oral, BID PRN, Darwin Paz MD, 20 g at 03/28/24 1131    lactulose (CHRONULAC) oral solution 20 g, 20 g, Oral, Once, Alexandrea Lugo MD    loratadine (CLARITIN) tablet 10 mg, 10 mg, Oral, Daily, Amada Epperson PA-C, 10 mg at 04/02/24 0817    meclizine (ANTIVERT) tablet 25 mg, 25 mg, Oral, Q8H PRN, Amada L  Cednall, PA-C    melatonin tablet 9 mg, 9 mg, Oral, HS, Alexandrea Lugo MD, 9 mg at 04/01/24 2117    midodrine (PROAMATINE) tablet 5 mg, 5 mg, Oral, TID AC, Amada ROSA Dagnall, PA-C, 5 mg at 04/02/24 1153    multivitamin-minerals (CENTRUM) tablet 1 tablet, 1 tablet, Oral, Daily, Amada L Cednall, PA-C, 1 tablet at 04/02/24 0817    polyethylene glycol (MIRALAX) packet 17 g, 17 g, Oral, Daily PRN, Ashley Depadua, MD, 17 g at 03/21/24 1655    pravastatin (PRAVACHOL) tablet 40 mg, 40 mg, Oral, Daily With Dinner, Amada Coughlinnall, PA-C, 40 mg at 04/01/24 1639    senna (SENOKOT) tablet 8.6 mg, 1 tablet, Oral, HS, Alexandrea Lugo MD, 8.6 mg at 04/01/24 2117    sertraline (ZOLOFT) tablet 25 mg, 25 mg, Oral, Daily, Amada L Dagnall, PA-C, 25 mg at 04/02/24 0817    tamsulosin (FLOMAX) capsule 0.4 mg, 0.4 mg, Oral, Daily With Dinner, Darwin Paz MD, 0.4 mg at 04/01/24 1639    Past Medical History:   Diagnosis Date    Arthritis     Encounter for general adult medical examination without abnormal findings 03/20/2019    Fall 11/03/2022    Hyperlipidemia     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    Macular degeneration, wet (HCC)     Urinary retention 06/02/2022       Patient Active Problem List    Diagnosis Date Noted    Behavior safety risk 02/20/2024    Insomnia 03/21/2024    Abnormal CT of the head 03/21/2024    Hematuria 03/04/2024    Headache 02/20/2024    Bilateral lower extremity weakness 02/17/2024    Abnormal CT scan, lumbar spine 02/15/2024    Severe protein-calorie malnutrition (HCC) 02/14/2024    Debility 02/13/2024    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    Dysphagia 11/21/2023    Macular degeneration, age related 02/27/2023    H/O traumatic subdural hematoma 02/27/2023    Sensorineural hearing loss (SNHL), bilateral 08/18/2022    Balance disorder 06/28/2022    Abnormal echocardiogram 06/27/2022    Right bundle-branch block 06/27/2022    Low BP 06/19/2022    Diastolic dysfunction 06/19/2022    EKG  abnormalities 06/19/2022    Constipation due to neurogenic bowel 06/02/2022    Urinary retention 06/02/2022    SDH (subdural hematoma) ([facility]) 05/27/2022    Primary osteoarthritis of left knee 05/17/2022    Hygroma 04/26/2022    Right hand pain     Carpal tunnel syndrome on right     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      ** Please Note:  voice to text software may have been used in the creation of this document. Although proof errors in transcription or interpretation are a potential of such software**

## 2024-04-02 NOTE — CASE MANAGEMENT
4/1/24 Patient's wife Gina brought in long term care insurance policies. CM called [employer] and obtained information regarding both policies. This information given to patient's wife Gina.  4/2/24 CM called [facility], spoke with Neva, inquired about making a referral. [facility] stated  they are accepting referrals and they have staff . CM gave an estimate of 10-12 hours per day. Neva set up an appt for tomorrow 4/3 11:00 am Chely will be out to [facility] to meet with patient and wife. CM requested information regarding license number to give to insurance company. Neva stated she will get the license number .CM called son Oni and daughter Allegra, made them aware of the appt, inquired if they would like to come to the meeting. Both of them stated they will come to the appt .   CM faxed financial power of  to [employer] Khakis  [phone],  as requested.  CM continue to follow.

## 2024-04-02 NOTE — PROGRESS NOTES
Progress Note - Thomas Chase 83 y.o. male MRN: [id number]    Unit/Bed#: [facility] 213-02 Encounter: [id number]        Subjective:   Patient seen and examined at bedside after reviewing the chart and discussing the case with the caring staff.      Patient examined at bedside.  Patient has no acute symptoms.      Physical Exam   Vitals: Blood pressure 90/64, pulse 61, temperature (!) 97.4 °F (36.3 °C), temperature source Temporal, resp. rate 14, height 6' (1.829 m), weight 57.9 kg (127 lb 10.3 oz), SpO2 93%.,Body mass index is 17.31 kg/m².  Constitutional: Patient in no acute distress.  HEENT: PERR, EOMI, MMM.  Cardiovascular: Normal rate and regular rhythm.    Pulmonary/Chest: Effort normal and breath sounds normal.   Abdomen: Soft, + BS, NT.    Assessment/Plan:  Thomas Chase is a(n) 83 y.o. year old male with left SDH and right SAH.     1.  Cardiac with hx of HTN, HLD/hypotension.  On pravastatin 40 mg daily (simvastatin nonformulary).  Noted to have low BP 3/10/24.  Patient's orthostatic vitals did not show significant drop.  Received IV fluids and started on midodrine.  Midodrine increased to 5 mg TID 3/21/24.  Continue to monitor.   2.  Allergic rhinitis.  Patient is on loratadine 10 mg daily.   3.  Depression/anxiety.  Patient is on Zoloft 25 mg daily.   4.  Insomnia.  Patient is on melatonin 6 mg at bedtime.   5.  Thrush.  Completed clotrimazole lozenges x 10 days on 3/21/24.   6.  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  Patient is mainly nonverbal.   7.  Urinary retention/UTI.  Urinary retention protocol.  Flomax decreased to 0.4 mg daily 3/12/2024 due to possible cause of hypotension.  Finley catheter placed 2/22/24, removed on 2/28/24, placed again 3/1/24, removed again 3/7/24 and reinserted 3/8/24.    Finley removed 3/22/24 and has been voiding well on own.   8.  Dysphagia.  Speech therapy following.  Dysphagia 1 puureed with nectar thick liquid.  Video swallow study done on  3/11/2024.  9.  Vitamin B12 deficiency.  Patient started on vitamin B12 500 mcg daily.   10.  Pain and bowel management.  As per physiatrist.   11.  Intracranial hemorrhage.   Patient is receiving intensive PT OT ST as per physiatrist.     Anticipated discharge date:  TBD.   PCP:  RONY Diaz    The patient was discussed with Dr. Paz and he is in agreement with the above note.

## 2024-04-02 NOTE — PROGRESS NOTES
04/02/24 1235   Pain Assessment   Pain Assessment Tool 0-10   Pain Score No Pain   Restrictions/Precautions   Precautions Aphasia;Aspiration;Bed/chair alarms;Cognitive;Fall Risk;Hard of hearing;Impulsive;Supervision on toilet/commode   Cognition   Overall Cognitive Status Impaired   Arousal/Participation Alert;Responsive;Cooperative   Attention Attends with cues to redirect   Orientation Level Oriented to person;Oriented to place   Memory Decreased short term memory;Decreased recall of recent events;Decreased recall of precautions   Following Commands Follows one step commands with increased time or repetition   Sit to Stand   Type of Assistance Needed Incidental touching;Supervision   Comment cg/close S with RW; visual/tactile cues for sequence/hand placement   Sit to Stand CARE Score 4   Bed-Chair Transfer   Type of Assistance Needed Incidental touching   Comment cg with RW; visual/tactile cues for sequence/direction/hand placement   Chair/Bed-to-Chair Transfer CARE Score 4   Walk 10 Feet   Type of Assistance Needed Incidental touching   Comment cg level and unlevel surfaces with RW; increased visual/tactile cues for sequence/direction   Walk 10 Feet CARE Score 4   Walk 50 Feet with Two Turns   Type of Assistance Needed Incidental touching   Comment cg level and unlevel surfaces with RW; increased visual/tactile cues for sequence/direction   Walk 50 Feet with Two Turns CARE Score 4   Walk 150 Feet   Type of Assistance Needed Incidental touching   Comment cg level and unlevel surfaces with RW; increased visual/tactile cues for sequence/direction   Walk 150 Feet CARE Score 4   Walking 10 Feet on Uneven Surfaces   Type of Assistance Needed Incidental touching   Comment cg level and unlevel surfaces with RW; increased visual/tactile cues for sequence/direction   Walking 10 Feet on Uneven Surfaces CARE Score 4   Ambulation   Primary Mode of Locomotion Prior to Admission Walk   Distance Walked (feet) 172 ft  (15'  (into bathroom) 172' 151' 25' x 2 (around PT gym) 153')   Assist Device Roller Walker   Gait Pattern Inconsistant Maria Victoria;Slow Maria Victoria;Decreased foot clearance;Forward Flexion;Narrow ILIANA   Limitations Noted In Balance;Coordination;Device Management;Endurance;Posture;Safety;Speed;Strength   Provided Assistance with: Balance;Direction   Walk Assist Level Contact Guard   Findings cg level and unlevel surfaces with RW; increased visual/tactile cues for sequence/direction   Does the patient walk? 2. Yes   Curb or Single Stair   Style negotiated Single stair   Type of Assistance Needed Incidental touching   Comment cg 14 steps using bilateral HR; increased visual/tactile cues  for sequence/direction   1 Step (Curb) CARE Score 4   4 Steps   Type of Assistance Needed Incidental touching   Comment cg 14 steps using bilateral HR; increased visual/tactile cues for sequence/direction   4 Steps CARE Score 4   12 Steps   Type of Assistance Needed Incidental touching   Comment cg 14 steps using bilateral HR; increased visual/tactile cues for sequence/direction   12 Steps CARE Score 4   Stairs   Type Stairs;Ramp   # of Steps 14   Weight Bearing Precautions WBAT;Fall Risk   Assist Devices Bilateral Rail;Roller Walker   Findings cg 14 steps using bilateral HR; increased visual/tactile cues for sequence/direction; cg ramp with increased visual/tactile cues for direction   Toilet Transfer   Type of Assistance Needed Incidental touching   Comment cg RW and grab bar; increased cues for sequence   Toilet Transfer CARE Score 4   Therapeutic Interventions   Strengthening seated ther ex   Balance gait and transfer training   Other ramp and stair negotiation   Equipment Use   NuStep L2, 11min, 0.50mi   Assessment   Treatment Assessment Patient agreeable to therapy session.   No pain reported.   Continues to require increased visual/tactile cues for sequence/technique/direction/hand placement for all tasks.    Completed ther ex for general LE  strengthening; gait and transfer training focusing on sequence and technique for improved balance and safety with functional mobility using walker.  Negotiates ramp with RW CG and steps with 2 handrails CG; both with increased cues for sequence/direction.   PT Barriers   Physical Impairment Decreased strength;Decreased range of motion;Decreased endurance;Impaired balance;Decreased mobility;Decreased coordination;Decreased cognition;Impaired judgement;Decreased safety awareness;Impaired hearing   Functional Limitation Wheelchair management;Walking;Transfers;Standing;Stair negotiation;Ramp negotiation;Car transfers   Plan   Treatment/Interventions Functional transfer training;LE strengthening/ROM;Elevations;Therapeutic exercise;Gait training   Progress Progressing toward goals   PT Therapy Minutes   PT Time In 1235   PT Time Out 1405   PT Total Time (minutes) 90   PT Mode of treatment - Individual (minutes) 90   PT Mode of treatment - Concurrent (minutes) 0   PT Mode of treatment - Group (minutes) 0   PT Mode of treatment - Co-treat (minutes) 0   PT Mode of Treatment - Total time(minutes) 90 minutes   PT Cumulative Minutes 2327   Therapy Time missed   Time missed? No

## 2024-04-02 NOTE — PROGRESS NOTES
04/02/24 0852   Pain Assessment   Pain Assessment Tool 0-10   Pain Score No Pain   Maciel-Baker FACES Pain Rating 0   Restrictions/Precautions   Precautions Aphasia;Aspiration;Bed/chair alarms;Cognitive;Fall Risk;Hard of hearing;Supervision on toilet/commode   Oral Hygiene   Type of Assistance Needed Supervision;Verbal cues   Comment visual prompts offerred   Oral Hygiene CARE Score 4   Grooming   Able To Wash/Dry Hands;Brush/Clean Teeth;Wash/Dry Face;Comb/Brush Hair   Limitation Noted In Problem Solving;Sequencing   Findings increased encouragment needed for success   Sit to Lying   Type of Assistance Needed Supervision;Verbal cues   Sit to Lying CARE Score 4   Sit to Stand   Type of Assistance Needed Supervision;Verbal cues   Physical Assistance Level No physical assistance   Comment visual cues   Sit to Stand CARE Score 4   Bed-Chair Transfer   Type of Assistance Needed Incidental touching;Verbal cues   Comment CGA RW   Chair/Bed-to-Chair Transfer CARE Score 4   Toileting Hygiene   Type of Assistance Needed Physical assistance;Verbal cues   Physical Assistance Level Total assistance   Comment incontinent of bowel in brief   Toileting Hygiene CARE Score 1   Toileting   Able to Pull Clothing down no, up no.   Manage Hygiene Bowel   Limitations Noted In Balance;ROM;Safety;LE Strength;UE Strength;Sequencing;Problem Solving   Toilet Transfer   Type of Assistance Needed Supervision;Verbal cues   Toilet Transfer CARE Score 4   Toilet Transfer   Surface Assessed Raised Toilet   Transfer Technique Standard   Limitations Noted In Balance;Endurance;Problem Solving;Safety;UE Strength;LE Strength   Adaptive Equipment Grab Bar;Walker   Positioning Concerns Grab Bars;Raised Seat;Cognition;Safety   Exercise Tools   Hand Gripper 7# digi multiple reps completed B hands   Other Exercise Tool 1 yareli box 2# 10-15 reps with constant cueing   Other Exercise Tool 2 placed pegs in foam board with R hand occasionally engaged L UE when  correcting placement following pattern with pegs placed by this OT. Error occurred 2/50 which was  easily corrected with visual cues from OT. Pt then instr to remove by color. Pt engaged core throughout task, encouraged to sit uprght unsupported in recliner. Pillows placed behind pt to facilitate upright posture. Pt would lean back between placements and thenneeding to pull self forward when placing pegs to board on table top.   Other Exercise Tool 3 1# DB in supine: shld flex/ext 2x10 reps, forward punch, elbow flex/ext with visual cueing   Cognition   Overall Cognitive Status Impaired   Arousal/Participation Alert;Responsive;Cooperative   Attention Attends with cues to redirect   Orientation Level Oriented to person;Oriented to place;Disoriented to time;Disoriented to situation   Memory Decreased recall of precautions;Decreased short term memory;Decreased recall of recent events   Following Commands Follows one step commands with increased time or repetition   Activity Tolerance   Activity Tolerance Patient limited by fatigue   Assessment   Treatment Assessment Skilled OT interventions consisted of meal mgmt, light grooming, TE and TA for fine motor, generalized activity tolerance, and cognitive activity. Details in respective sections. Pt continues to show progress in sustained attention, working memory, and problem solving in respective tasks. Communication via white board from this OT to pt with pt responses minimal. Possibly due to new OT working with pt and he is unfamiliar with this OT. Pt's barriers to d/c include decreased strength throughout, decreased balance, impaired hearing, impaired cognition including safety awareness, problem solving, attention, comprehension, and expression, and decreased activity tolerance; all affect independence in self care and fxl transfers. Pt would benefit from continued skilled OT services in order to address listed barriers and prepare for safe d/c.   Prognosis Fair    Problem List Decreased strength;Decreased range of motion;Decreased endurance;Impaired balance;Decreased mobility;Decreased cognition;Impaired judgement;Decreased safety awareness;Impaired hearing   Plan   Treatment/Interventions Functional transfer training;ADL retraining;Therapeutic exercise;Endurance training;Cognitive reorientation;Patient/family training;Compensatory technique education;Spoke to nursing;OT   Progress Progressing toward goals   OT Therapy Minutes   OT Time In 0852   OT Time Out 1022   OT Total Time (minutes) 90   OT Mode of treatment - Individual (minutes) 90   OT Mode of treatment - Concurrent (minutes) 0   OT Mode of treatment - Group (minutes) 0   OT Mode of treatment - Co-treat (minutes) 0   OT Mode of Treatment - Total time(minutes) 90 minutes   OT Cumulative Minutes 90   Therapy Time missed   Time missed? No

## 2024-04-02 NOTE — PROGRESS NOTES
04/02/24 1130   Pain Assessment   Pain Assessment Tool 0-10   Pain Score No Pain   Swallow Assessment   Swallow Treatment Assessment effortful swallow completed inoconsistently. spirometer completed x1  with max cue up to 250 but unsucessful across 10 other attempts with max cue.Unable to blow bubbles into a cup a water despite max cue not following direction and only sucking in vs. out. Nectar thick liquids via single straw sips and water via single straw sips continue with inconsistent coughing.   SLP Therapy Minutes   SLP Time In 1130   SLP Time Out 1200   SLP Total Time (minutes) 30   SLP Mode of treatment - Individual (minutes) 30   SLP Mode of treatment - Concurrent (minutes) 0   SLP Mode of treatment - Group (minutes) 0   SLP Mode of treatment - Co-treat (minutes) 0   SLP Mode of Treatment - Total time(minutes) 30 minutes   SLP Cumulative Minutes 995

## 2024-04-02 NOTE — PLAN OF CARE
Problem: PAIN - ADULT  Goal: Verbalizes/displays adequate comfort level or baseline comfort level  Description: Interventions:  - Encourage patient to monitor pain and request assistance  - Assess pain using appropriate pain scale  - Administer analgesics based on type and severity of pain and evaluate response  - Implement non-pharmacological measures as appropriate and evaluate response  - Consider cultural and social influences on pain and pain management  - Notify physician/advanced practitioner if interventions unsuccessful or patient reports new pain  Outcome: Progressing     Problem: INFECTION - ADULT  Goal: Absence or prevention of progression during hospitalization  Description: INTERVENTIONS:  - Assess and monitor for signs and symptoms of infection  - Monitor lab/diagnostic results  - Monitor all insertion sites, i.e. indwelling lines, tubes, and drains  - Monitor endotracheal if appropriate and nasal secretions for changes in amount and color  - [place] appropriate cooling/warming therapies per order  - Administer medications as ordered  - Instruct and encourage patient and family to use good hand hygiene technique  - Identify and instruct in appropriate isolation precautions for identified infection/condition  Outcome: Progressing     Problem: SAFETY ADULT  Goal: Patient will remain free of falls  Description: INTERVENTIONS:  - Educate patient/family on patient safety including physical limitations  - Instruct patient to call for assistance with activity   - Consult OT/PT to assist with strengthening/mobility   - Keep Call bell within reach  - Keep bed low and locked with side rails adjusted as appropriate  - Keep care items and personal belongings within reach  - Initiate and maintain comfort rounds  - Make Fall Risk Sign visible to staff  - Offer Toileting every 2 Hours, in advance of need  - Initiate/Maintain bed/chair alarm  - Apply yellow socks and bracelet for high fall risk patients  -  Consider moving patient to room near nurses station  Outcome: Progressing     Problem: Prexisting or High Potential for Compromised Skin Integrity  Goal: Skin integrity is maintained or improved  Description: INTERVENTIONS:  - Identify patients at risk for skin breakdown  - Assess and monitor skin integrity  - Assess and monitor nutrition and hydration status  - Monitor labs   - Assess for incontinence   - Turn and reposition patient  - Assist with mobility/ambulation  - Relieve pressure over bony prominences  - Avoid friction and shearing  - Provide appropriate hygiene as needed including keeping skin clean and dry  - Evaluate need for skin moisturizer/barrier cream  - Collaborate with interdisciplinary team   - Patient/family teaching  - Consider wound care consult   Outcome: Progressing     Problem: Nutrition/Hydration-ADULT  Goal: Nutrient/Hydration intake appropriate for improving, restoring or maintaining nutritional needs  Description: Monitor and assess patient's nutrition/hydration status for malnutrition. Collaborate with interdisciplinary team and initiate plan and interventions as ordered.  Monitor patient's weight and dietary intake as ordered or per policy. Utilize nutrition screening tool and intervene as necessary. Determine patient's food preferences and provide high-protein, high-caloric foods as appropriate.     INTERVENTIONS:  - Monitor oral intake, urinary output, labs, and treatment plans  - Assess nutrition and hydration status and recommend course of action  - Evaluate amount of meals eaten  - Assist patient with eating if necessary   - Allow adequate time for meals  - Recommend/ encourage appropriate diets, oral nutritional supplements, and vitamin/mineral supplements  - Order, calculate, and assess calorie counts as needed  - Recommend, monitor, and adjust tube feedings and TPN/PPN based on assessed needs  - Assess need for intravenous fluids  - Provide specific nutrition/hydration  education as appropriate  - Include patient/family/caregiver in decisions related to nutrition  Outcome: Progressing

## 2024-04-03 PROCEDURE — 97535 SELF CARE MNGMENT TRAINING: CPT

## 2024-04-03 PROCEDURE — 97110 THERAPEUTIC EXERCISES: CPT

## 2024-04-03 PROCEDURE — 97116 GAIT TRAINING THERAPY: CPT

## 2024-04-03 PROCEDURE — 97537 COMMUNITY/WORK REINTEGRATION: CPT

## 2024-04-03 PROCEDURE — 92526 ORAL FUNCTION THERAPY: CPT | Performed by: NURSE PRACTITIONER

## 2024-04-03 PROCEDURE — 97530 THERAPEUTIC ACTIVITIES: CPT

## 2024-04-03 PROCEDURE — 99233 SBSQ HOSP IP/OBS HIGH 50: CPT | Performed by: PHYSICAL MEDICINE & REHABILITATION

## 2024-04-03 RX ADMIN — ENOXAPARIN SODIUM 40 MG: 40 INJECTION SUBCUTANEOUS at 09:18

## 2024-04-03 RX ADMIN — CYANOCOBALAMIN TAB 500 MCG 500 MCG: 500 TAB at 09:18

## 2024-04-03 RX ADMIN — ACETAMINOPHEN 650 MG: 325 TABLET ORAL at 21:15

## 2024-04-03 RX ADMIN — SERTRALINE HYDROCHLORIDE 25 MG: 25 TABLET ORAL at 09:18

## 2024-04-03 RX ADMIN — PRAVASTATIN SODIUM 40 MG: 40 TABLET ORAL at 16:54

## 2024-04-03 RX ADMIN — MIDODRINE HYDROCHLORIDE 5 MG: 5 TABLET ORAL at 09:18

## 2024-04-03 RX ADMIN — MIDODRINE HYDROCHLORIDE 5 MG: 5 TABLET ORAL at 16:54

## 2024-04-03 RX ADMIN — MIDODRINE HYDROCHLORIDE 5 MG: 5 TABLET ORAL at 12:44

## 2024-04-03 RX ADMIN — LORATADINE 10 MG: 10 TABLET ORAL at 09:18

## 2024-04-03 RX ADMIN — TAMSULOSIN HYDROCHLORIDE 0.4 MG: 0.4 CAPSULE ORAL at 16:54

## 2024-04-03 RX ADMIN — Medication 1 TABLET: at 09:18

## 2024-04-03 RX ADMIN — SENNOSIDES 8.6 MG: 8.6 TABLET, FILM COATED ORAL at 21:15

## 2024-04-03 RX ADMIN — Medication 9 MG: at 21:14

## 2024-04-03 NOTE — PROGRESS NOTES
PM&R PROGRESS NOTE:  Thomas Chase 83 y.o. male MRN: [id number]  Unit/Bed#: -02 Encounter: [id number]        **CHANGE IN REHAB DIAGNOSIS FROM PRE-ADMISSION SCREEN  Rehab Diagnosis: Impairment of mobility, safety, Activities of Daily Living (ADLs), and cognitive/communication skills due to Brain Dysfunction:  02.22  Traumatic, Closed Injury  Etiologic Diagnosis: L SDH, R SAH, L anterior hypodensity lateral temporal lobe    HPI: Thomas Chase is a 83 y.o. male with past medical history significant for previous fall with resultant traumatic brain injury-subdural hematoma in 2022 status post left craniotomy, chronic aphasia, hard of hearing with cochlear implant in place, hyperlipidemia who presented to the [facility] on 2/13/2024 with increased weakness and mental status change for several days.  Unclear if patient had head trauma.  CT head showing a 3 mm left frontal subdural hemorrhage.  CT lumbar spine showing DJD.  Patient found to have dysphagia and was seen by speech therapy.  VFSS on 2/15/2024 cleared him for a puréed diet with thin liquids.  Patient was also started by psychiatry for depression and started on Zoloft 25 mg daily.  Patient sustained a rapid response and a unwitnessed fall with head trauma notable bump on head on 2/15/2024.  CT head showing a new small volume acute subarachnoid hemorrhage in the left frontal opercular region as well as new nondisplaced fracture of the zygomatic arch with soft tissue contusion, stable 3 mm subdural hemorrhage seen previously.  Patient transferred to [facility] for further evaluation.  Patient seen by neurology and neurosurgery.  CTA head and neck showing a left anterior hypodensity in the left anterior lateral temporal lobe with 2 punctate hyperdensities.  These represented possible nonhemorrhagic contusions or venous infarcts.  Less likely to represent ischemia or neoplasm.  Follow-up imaging recommended with CT head in 2-3 weeks.  CT  venogram showing patent dural venous sinuses.  Patient was cleared for DVT prophylaxis and started on Keppra for seizure prophylaxis.    Medical course complicated by suspected UTI based on UA and symptoms.  Patient was treated with antibiotics x 3 days.  After medical stabilization, patient found to have acute functional deficits from his baseline in mobility, self-care, speech swallow cognition therefore admitted to [facility] for acute inpatient rehabilitation.    SUBJECTIVE: No new issues reported by nursing or patient.  Evaluation by in home care company today.     ASSESSMENT: Stable, progressing      PLAN:    Rehabilitation  Functional deficits: Aphasia, dysphagia, impaired cognition, impaired balance, impulsivity, poor safety awareness impaired mobility, self care    Continue current rehabilitation plan of care to maximize function.  I personally attended, reviewed, and discussed medical and functional updates in team conference today.  Please refer to advance care planning note for details.  Expected Discharge: TBD which day.  Plan is for Home with home care and hired assistance.   Arrangements for assistance in home being made by family and case management     Current Function:  Physical Therapy Occupational Therapy Speech Therapy   Weight Bearing Status: Weight Bearing as Tolerated  Transfers: Incidental Touching, Supervision  Bed Mobility: Incidental Touching, Supervision  Amulation Distance (ft): 212 feet  Ambulation: Incidental Touching  Assistive Device for Ambulation: Roller Walker  Wheelchair Mobility Distance: 133 ft  Wheelchair Mobility: Maximum Assistance (max visual and tactile cues)  Number of Stairs: 14  Assistive Device for Stairs: Bilateral Hand Rails  Stair Assistance: Incidental Touching  Ramp: Incidental Touching  Assistive Device for Ramp: Roller Walker  Discharge Recommendations: Home with:  DC Home with:: Family Support, First Floor Setup, Home Physical Therapy   Eating:  Supervision  Grooming: Supervision  Bathing: Minimal Assistance  Bathing: Minimal Assistance  Upper Body Dressing: Minimal Assistance  Lower Body Dressing: Maximum Assistance  Toileting: Maximum Assistance  Tub/Shower Transfer: Incidental Touching  Toilet Transfer: Supervision  Cognition: Exceptions to WNL  Cognition: Decreased Executive Functions, Decreased Attention, Decreased Comprehension, Decreased Safety  Orientation: Person, Place   Mode of Communication: Non-verbal  Speech/Language: Expressive Aphasia  Cognition: Exceptions to WNL  Cognition: Decreased Memory, Decreased Executive Functions, Decreased Attention, Decreased Comprehension, Decreased Safety  Orientation: Person, Place (unintelligible response for time and situation.)  Swallowing: Exceptions to WNL  Swallowing: Oral Dysphagia, Pharyngeal Dysphagia, Aspiration Risk  Diet Recommendations: Level 1/Puree, Lutcher Thick  Discharge Recommendations:  (home vs. SNF)  DC Home with:: 24 Hour Supervision, Family Support (continued speech therapy services)         DVT prophylaxis  Continue Lovenox - will NOT need at discharge      * Intracranial hemorrhage (HCC)-resolved as of 3/21/2024  Assessment & Plan  Initial presentation on 2/13/2024 with increased weakness in the legs, mental status changes for several days  CT head showing a 3 mm left frontal subdural hemorrhage  Rapid response 2/15/2024 with fall and head trauma  CT head showing a new small volume acute subarachnoid hemorrhage in the left frontal opercular region and new nondisplaced fracture of the zygomatic arch with soft tissue contusion.    Transferred to [facility]  CTA head and neck and and repeat CT head showing a left anterior hypodensity in the left anterior lateral temporal lobe with 2 punctate hyperdensities.  Differential diagnosis nonhemorrhagic contusions or venous infarcts.  Patient seen by neurology and neurosurgery  Conservative management  Cleared for DVT prophylaxis  Started on Keppra for  seizure prophylaxis  Repeat CT head scheduled on Monday, 3/4/2024 -personally reviewed  Radiology read as follows:  Improving left anterior temporal lobe contusions with resolving edema. Stable subacute subdural hemorrhage along the anterior left frontal convexity. Resolved subarachnoid and intraventricular hemorrhages. Unchanged minimal hyperdensity beneath the left craniotomy flap. Stable right parafalcine subdural hygroma. There is no new intra or extra-axial hemorrhage.  Follow-up with neurosurgery on 3/6/2024 virtually completed.  Recommendations as follows: Neurosurgery will sign off, patient will follow-up in 2 weeks with repeat CT head   CTH 3/16/24: No acute intracranial abnormality.Stable thin chronic left frontal subdural collection stable since 11/10/2023. Evaluation of the left temporal lobe is limited due to artifact from the cochlear implant but there may be mild developing encephalomalacia related to prior contusion.  Follow-up with neurosurgery and neurology in outpatient clinic (appointment is 3/21/24 with neurology in 3/22/2024 with neurosurgery)  Follow-up with Neurology in 3 months  Follow-up with Neurosurgery PRN    Behavior safety risk  Assessment & Plan  Doing well in ARC  Safety behavior plan going well.  Q15 min checks - at times impulsive getting up from bed, forgetful to use call bell, but does respond well to reminders.     Intermittently forgetful to use call bell, but usually does well with this.   Nursing and staff to provide close supervision near nursing station  Patient placed on 4 side rails (not as a restraint, patient and wife preference)  Monitor sleep-wake cycle - better overall     Patient demonstrating mild impulsivity in acute care only   Fall x 2 in acute care  Falls at home in the past    Insomnia  Assessment & Plan  Continue melatonin to 9 mg QHS    Hematuria  Assessment & Plan  RESOVLED  Started 3/2 - 3/3/2024  Minor, and more likely related to irritation from Finley  catheter  Irrigation as needed per urology  Asymptomatic  H&H stable    Headache  Assessment & Plan  Resolved  Continue Tylenol 650 mg PRN     Severe protein-calorie malnutrition (HCC)  Assessment & Plan  BMI 17.31  Nutrition following  Optimize oral intake  Supplements ordered  He is eating % of meals  Hydration is variable  Maintain IV  IVF PRN  SLP working towards free water protocol    Dysphagia  Assessment & Plan  VFSS completed 2/15/2024  Cleared for a puréed diet with nectar thick liquids  SLP to follow while at [facility]  Repeat VFSS 3/11/24: Mild oral dysphagia, Moderate oral-pharyngeal dysphagia  Recommendations:  Diet: Dysphagia 1 pureed   Liquids: Nectar   Patient doing very well with oral food eating % of all of his meals.  Also gaining weight.  At times has difficulty with fluid intake due to nectar thick liquids and dysphagia.  Repeat swallow study recommended in 3-4 weeks  Supplement with IV fluids if needed  SLP also working towards a free water nectar thick protocol with oral care prior  Possible future consideration for PEG       Low BP  Assessment & Plan  Still low at times, but asymptomatic.  His normal appears to be SBP   Continue Midodrine 5 mg TID (3/21/24) per primary service.  Continue compression stockings during day.  Adequate hydration    BP lower since 3/10/2024  Compression stockings and abdominal binder ordered  Internal medicine ordering IV fluid bolus.  I continued IVF x 1 L which was completed  BP continues to be very low especially in standing.   Labs reviewed - nothing to explain low BP        Urinary retention  Assessment & Plan  Finley catheter removed on [facility]  Trial of void underway  Mixed pattern  Unfortunately required multiple straight catheterizations  Finley catheter replaced 3/1/24 per urology  Flomax reduced to 0.4 mg daily due to hypotension   Trial of void 3/7/2024 - failed this  Finley re-inserted 3/8/24  Patient likely to require longer time prior to removal  of Finley catheter -recommend removal in 2 weeks around 3/22/2024  Finley removed and Trial of void started 3/22/2024  Slow in the beginning however after good BM patient was starting to void more with lower PVRs.  Having both continent and incontinent episodes -mixed pattern  Timed toilet frequently  Bladder scans now for lack of void over shift       Constipation due to neurogenic bowel  Assessment & Plan  Patient started on more aggressive bowel program    Hearing loss  Assessment & Plan  Chronic hearing loss  Patient status post cochlear implant  MRI contraindicated    Hypercholesterolemia  Assessment & Plan  Continue simvastatin 20 mg daily (home dose)    UTI (urinary tract infection)-resolved as of 2/21/2024  Assessment & Plan  RESOLVED   Suspected UTI based on UA and symptoms in acute care  Completed 3 days IV antibiotics    In ARC:  UA was ordered by IM : equivocal - neg.   Culture was done by internal medicine.  Enterococcus faecalis  Likely colonized.  Asymptomatic.  Over the weekend, however, Dr. Paz did decide to initiate treatment with Levaquin x 5 days.          Appreciate IM consultants medical co-management.  Labs, medications, and imaging personally reviewed.      ROS:  A ten point review of systems was completed on 04/03/24 and pertinent positives are listed in subjective section. All other systems reviewed were negative.       OBJECTIVE:   /83 (BP Location: Left arm)   Pulse 88   Temp 98.2 °F (36.8 °C) (Temporal)   Resp 17   Ht 6' (1.829 m)   Wt 57.9 kg (127 lb 10.3 oz)   SpO2 95%   BMI 17.31 kg/m²       Physical Exam  Vitals and nursing note reviewed.   Constitutional:       General: He is not in acute distress.     Appearance: He is well-developed.   HENT:      Head: Normocephalic.      Ears:      Comments: Impaired hearing      Nose: Nose normal.   Eyes:      Conjunctiva/sclera: Conjunctivae normal.   Cardiovascular:      Rate and Rhythm: Normal rate and regular rhythm.       Pulses: Normal pulses.   Pulmonary:      Effort: Pulmonary effort is normal.      Breath sounds: Normal breath sounds. No wheezing.   Abdominal:      General: Bowel sounds are normal. There is no distension.      Palpations: Abdomen is soft.      Tenderness: There is no abdominal tenderness.   Musculoskeletal:         General: No swelling.      Cervical back: Neck supple.   Skin:     General: Skin is warm.   Neurological:      Mental Status: He is alert.      Comments: Aphasic but can express certain words and phrases   Comprehension overall intact   Balance is improving   Psychiatric:         Mood and Affect: Mood normal.          Lab Results   Component Value Date    WBC 5.93 04/02/2024    HGB 12.3 04/02/2024    HCT 39.3 04/02/2024    MCV 99 (H) 04/02/2024     (H) 04/02/2024     Lab Results   Component Value Date    SODIUM 138 04/02/2024    K 4.2 04/02/2024     04/02/2024    CO2 32 04/02/2024    BUN 16 04/02/2024    CREATININE 0.69 04/02/2024    GLUC 80 04/02/2024    CALCIUM 8.9 04/02/2024     Lab Results   Component Value Date    INR 1.02 06/02/2022    INR 1.10 05/29/2022    INR 1.04 05/28/2022    PROTIME 13.0 06/02/2022    PROTIME 13.7 05/29/2022    PROTIME 13.2 05/28/2022           Current Facility-Administered Medications:     acetaminophen (TYLENOL) tablet 650 mg, 650 mg, Oral, Q6H PRN, Daniel Clayton MD, 650 mg at 04/02/24 2136    bisacodyl (DULCOLAX) rectal suppository 10 mg, 10 mg, Rectal, Daily PRN, Darwin Paz MD, 10 mg at 03/22/24 1327    cyanocobalamin (VITAMIN B-12) tablet 500 mcg, 500 mcg, Oral, Daily, Darwin Paz MD, 500 mcg at 04/03/24 0918    enoxaparin (LOVENOX) subcutaneous injection 40 mg, 40 mg, Subcutaneous, Q24H JEANNIE, Alexandrea Lugo MD, 40 mg at 04/03/24 0918    lactulose (CHRONULAC) oral solution 20 g, 20 g, Oral, BID PRN, Darwin Paz MD, 20 g at 03/28/24 1131    lactulose (CHRONULAC) oral solution 20 g, 20 g, Oral, Once, Alexandrea Lugo MD    loratadine  (CLARITIN) tablet 10 mg, 10 mg, Oral, Daily, Amada LEE Dagnall, PA-C, 10 mg at 04/03/24 0918    meclizine (ANTIVERT) tablet 25 mg, 25 mg, Oral, Q8H PRN, Amada Coughlinnall, PA-C    melatonin tablet 9 mg, 9 mg, Oral, HS, Alexandrea Lugo MD, 9 mg at 04/02/24 2136    midodrine (PROAMATINE) tablet 5 mg, 5 mg, Oral, TID AC, Amada LEE Dagnall, PA-C, 5 mg at 04/03/24 0918    multivitamin-minerals (CENTRUM) tablet 1 tablet, 1 tablet, Oral, Daily, Amada Coughlinnall, PA-C, 1 tablet at 04/03/24 0918    polyethylene glycol (MIRALAX) packet 17 g, 17 g, Oral, Daily PRN, Ashley Depadua, MD, 17 g at 03/21/24 1655    pravastatin (PRAVACHOL) tablet 40 mg, 40 mg, Oral, Daily With Dinner, Amada Coughlinnall, PA-C, 40 mg at 04/02/24 1717    senna (SENOKOT) tablet 8.6 mg, 1 tablet, Oral, HS, Aelxandrea Lugo MD, 8.6 mg at 04/01/24 2117    sertraline (ZOLOFT) tablet 25 mg, 25 mg, Oral, Daily, Amada Coughlinnall, PA-C, 25 mg at 04/03/24 0918    tamsulosin (FLOMAX) capsule 0.4 mg, 0.4 mg, Oral, Daily With Dinner, Darwin Paz MD, 0.4 mg at 04/02/24 1718    Past Medical History:   Diagnosis Date    Arthritis     Encounter for general adult medical examination without abnormal findings 03/20/2019    Fall 11/03/2022    Hyperlipidemia     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    Macular degeneration, wet (HCC)     Urinary retention 06/02/2022       Patient Active Problem List    Diagnosis Date Noted    Behavior safety risk 02/20/2024    Insomnia 03/21/2024    Abnormal CT of the head 03/21/2024    Hematuria 03/04/2024    Headache 02/20/2024    Bilateral lower extremity weakness 02/17/2024    Abnormal CT scan, lumbar spine 02/15/2024    Severe protein-calorie malnutrition (HCC) 02/14/2024    Debility 02/13/2024    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    Dysphagia 11/21/2023    Macular degeneration, age related 02/27/2023    H/O traumatic subdural hematoma 02/27/2023    Sensorineural hearing loss (SNHL), bilateral 08/18/2022    Balance disorder  06/28/2022    Abnormal echocardiogram 06/27/2022    Right bundle-branch block 06/27/2022    Low BP 06/19/2022    Diastolic dysfunction 06/19/2022    EKG abnormalities 06/19/2022    Constipation due to neurogenic bowel 06/02/2022    Urinary retention 06/02/2022    SDH (subdural hematoma) ([facility]) 05/27/2022    Primary osteoarthritis of left knee 05/17/2022    Hygroma 04/26/2022    Right hand pain     Carpal tunnel syndrome on right     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      ** Please Note:  voice to text software may have been used in the creation of this document. Although proof errors in transcription or interpretation are a potential of such software**

## 2024-04-03 NOTE — NURSING NOTE
Patient resting in bed at this time.  No signs of distress noted.  Bed alarm and q15 minute checks in place to promote patient safety.  Patient was incontinent of urine overnight with assist provided for toileting tasks.  Heels elevated off of bed on a pillow to promote skin integrity.  Call bell within reach.  Plan of care ongoing.

## 2024-04-03 NOTE — PROGRESS NOTES
Progress Note - Thomas Chase 83 y.o. male MRN: [id number]    Unit/Bed#: [facility] 213-02 Encounter: [id number]        Subjective:   Patient seen and examined at bedside after reviewing the chart and discussing the case with the caring staff.      Patient examined at bedside.  Patient has no acute symptoms.      Physical Exam   Vitals: Blood pressure 115/83, pulse 88, temperature 98.2 °F (36.8 °C), temperature source Temporal, resp. rate 17, height 6' (1.829 m), weight 57.9 kg (127 lb 10.3 oz), SpO2 95%.,Body mass index is 17.31 kg/m².  Constitutional: Patient in no acute distress.  HEENT: PERR, EOMI, MMM.  Cardiovascular: Normal rate and regular rhythm.    Pulmonary/Chest: Effort normal and breath sounds normal.   Abdomen: Soft, + BS, NT.    Assessment/Plan:  Thomas Chase is a(n) 83 y.o. year old male with left SDH and right SAH.     1.  Cardiac with hx of HTN, HLD/hypotension.  On pravastatin 40 mg daily (simvastatin nonformulary).  Noted to have low BP 3/10/24.  Patient's orthostatic vitals did not show significant drop.  Received IV fluids and started on midodrine.  Midodrine increased to 5 mg TID 3/21/24.  Continue to monitor.   2.  Allergic rhinitis.  Patient is on loratadine 10 mg daily.   3.  Depression/anxiety.  Patient is on Zoloft 25 mg daily.   4.  Insomnia.  Patient is on melatonin 6 mg at bedtime.   5.  Thrush.  Completed clotrimazole lozenges x 10 days on 3/21/24.   6.  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  Patient is mainly nonverbal.   7.  Urinary retention/UTI.  Urinary retention protocol.  Flomax decreased to 0.4 mg daily 3/12/2024 due to possible cause of hypotension.  Finley catheter placed 2/22/24, removed on 2/28/24, placed again 3/1/24, removed again 3/7/24 and reinserted 3/8/24.    Finley removed 3/22/24 and has been voiding well on own.   8.  Dysphagia.  Speech therapy following.  Dysphagia 1 puureed with nectar thick liquid.  Video swallow study done on 3/11/2024.  9.   Vitamin B12 deficiency.  Patient started on vitamin B12 500 mcg daily.   10.  Pain and bowel management.  As per physiatrist.   11.  Intracranial hemorrhage.   Patient is receiving intensive PT OT ST as per physiatrist.     Anticipated discharge date:  TBD.   PCP:  RONY Diaz    The patient was discussed with Dr. Paz and he is in agreement with the above note.

## 2024-04-03 NOTE — TEAM CONFERENCE
Acute RehabilitationTeam Conference Note  Date: 4/3/2024   Time: 10:52 AM       Patient Name:  Thomas Chase       Medical Record Number: [id number]   YOB: 1940  Sex: Male          Room/Bed:  [facility] 213/[facility] 213-02  Payor Info:  Payor: MEDICARE / Plan: MEDICARE A AND B / Product Type: Medicare A & B Fee for Service /      Admitting Diagnosis: Cl fracture base skull wth intracran hemorrhage, no loss consciousness (HCC) [S02.109A, S06.300A]   Admit Date/Time:  2/20/2024  3:13 PM  Admission Comments: No comment available     Primary Diagnosis:  Intracranial hemorrhage (HCC)  Principal Problem: Intracranial hemorrhage (HCC)    Patient Active Problem List    Diagnosis Date Noted    Insomnia 03/21/2024    Abnormal CT of the head 03/21/2024    Hematuria 03/04/2024    Headache 02/20/2024    Behavior safety risk 02/20/2024    Bilateral lower extremity weakness 02/17/2024    Abnormal CT scan, lumbar spine 02/15/2024    Severe protein-calorie malnutrition (HCC) 02/14/2024    Debility 02/13/2024    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    Dysphagia 11/21/2023    Macular degeneration, age related 02/27/2023    H/O traumatic subdural hematoma 02/27/2023    Sensorineural hearing loss (SNHL), bilateral 08/18/2022    Balance disorder 06/28/2022    Abnormal echocardiogram 06/27/2022    Right bundle-branch block 06/27/2022    Low BP 06/19/2022    Diastolic dysfunction 06/19/2022    EKG abnormalities 06/19/2022    Constipation due to neurogenic bowel 06/02/2022    Urinary retention 06/02/2022    SDH (subdural hematoma) ([facility]) 05/27/2022    Primary osteoarthritis of left knee 05/17/2022    Hygroma 04/26/2022    Right hand pain     Carpal tunnel syndrome on right     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    Overweight (BMI 25.0-29.9) 01/27/2021    Negative depression screening 09/26/2019    Hypercholesterolemia 07/12/2018    Borderline hypertension 07/12/2018    Hearing loss 04/08/2009       Physical Therapy:    Weight  Bearing Status: Weight Bearing as Tolerated  Transfers: Incidental Touching, Supervision  Bed Mobility: Incidental Touching, Supervision  Amulation Distance (ft): 212 feet  Ambulation: Incidental Touching  Assistive Device for Ambulation: Roller Walker  Wheelchair Mobility Distance: 133 ft  Wheelchair Mobility: Maximum Assistance (max visual and tactile cues)  Number of Stairs: 14  Assistive Device for Stairs: Bilateral Hand Rails  Stair Assistance: Incidental Touching  Ramp: Incidental Touching  Assistive Device for Ramp: Roller Walker  Discharge Recommendations: Home with:  DC Home with:: Family Support, First Floor Setup, Home Physical Therapy     02.21/2024:   Patient seen today for IE.  Presents, following hospitalizaton  for  traumatic brain dysfunction (L SDH, R SAH, L anterior hypodensity lateral temporal lobe) and PMH significant for previous fall with TBI (SDH with L craniotomy), chronic aphasia, hard of hearing with cochlear implants in place, and hyperlipidemia.    Patient MIN A bed mobility, MOD A transfers with walker, MAX A wheelchair mobility level and unlevel surfaces up to 133' with increased visual and tactile cues, min-mod A ambulation up to 66' level and unlevel surfaces with walker and wheelchair follow.  Patient limited by decreased ROM/strength, decreased balance and safety, decreased endurance, impaired  cognition, and impaired hearing (patient wears cochlear implants).   May benefit from continued inpatient [facility] PT to increase function, safety, and increased independence in prep for safe d/c to home with family and continued PT services as needed.    02/28/2024:   Patient participating in therapy and making positive gains.  Patient CG/S bed mobility, CG transfers with walker, CG ambulation up to 167' level and unlevel surfaces with walker, cg negotiation of 7 steps with 2 handrails.   Patient requires increased visual and tactile cues for general safety, Finley management, and task sequence.   Patient also remains limited by decreased ROM/strength, decreased balance and safety, decreased endurance, impaired cognition, aphasia, and impaired hearing.  Patient may benefit from continued inpatient [facility] PT to increase function, safety, and increased independence in prep for safe d/c to home with family and continued PT services.    03/06/2024:   Patient participating in therapy and continuing to make positive gains.   Patient CG/S bed mobility, CG/close S transfers with walker, CG/close S ambulation up to 180' level and unlevel surfaces with walker, MIN A ambulation outside on uneven pavement/sidewalks, CG/close S negotiation of steps with 2 handrails, CG ramp with walker.  Patient remains limited by decreased ROM/strength, decreased balance and safety, decreased endurance, impaired cognition, aphasia, and impaired hearing.   Patient may benefit from continued inpatient [facility] PT to increase function, safety, and increased independence in prep for safe d/c to home with family and continued PT services as needed.    03/13/2024:   Patient participating in therapy.   Patient S bed mobility with bed rails, CG/close S transfers with walker, cg ambulation up to 190' level and unlevel surfaces with walker, CG ramp with walker, cg 14 steps with 2 handrails.   Remains limited by decreased ROM/strength, decreased balance and safety, decreased endurance, impaired cognition, aphasia, and impaired hearing.  May benefit from continued inpatient [facility] PT to increase function, safety, and independence in prep for safe d/c to home with family and continued PT services as needed vs alternate placement.    03/20/2024:  Patient participating in therapy.  Patient S bed mobility, close S/S STS transfers with walker, CG SPT with walker, CG ambulation up to 206' level and unlevel surfaces with walker, CG ramp with walker, cg 14 steps with 2 handrails.  Patient continues to require increased visual/tactile cues for sequence, technique, and  direction to safely complete all tasks.  Remains limited by decreased ROM/strength, decreased balance and safety, decreased endurance, impaired cognition, aphasia, impaired hearing.  Awaiting SNF bed.    03/27/2024:   Patient participating in therapy.   Patient CG/S bed mobility using bed rail; cg/close S STS with walker, CG SPT with walker, CG ambulation with walker level and unlevel surfaces, CG ramp with walker, CG 14 steps with 2 handrails.   Patient continues to require increased visual/tactile cues for sequence, technique, hand placement, and direction to safely complete all tasks.    Finley removed.   Working on family training with wife in anticipation of d/c to home vs alternate placement.   Remains limited by decreased ROM/strength, decreased balance and safety, decreased endurance, impaired cognition, aphasia, and impaired hearing.  Continue POC.    04/03/2024:  Patient participating in therapy.   Patient CG/S bed mobility using bed rail; CG/close S STS with walker, CG SPT with walker, CG ambulation with walker level and unlevel surfaces, CG ramp with walker, CG 14 steps with 2 handrails.   Patient continues to require increased visual/tactile cues for sequence, technique, hand placement, and direction to safely complete all tasks.  Ongoing family training with wife.   Home eval likely on discharge.  Remains limited by decreased ROM/strength, decreased balance and safety, decreased endurance, impaired cognition, aphasia, and impaired hearing.  Continue POC.        Occupational Therapy:  Eating: Supervision  Grooming: Supervision  Bathing: Minimal Assistance  Bathing: Minimal Assistance  Upper Body Dressing: Minimal Assistance  Lower Body Dressing: Maximum Assistance  Toileting: Maximum Assistance  Tub/Shower Transfer: Incidental Touching  Toilet Transfer: Supervision  Cognition: Exceptions to WNL  Cognition: Decreased Executive Functions, Decreased Attention, Decreased Comprehension, Decreased  Safety  Orientation: Person, Place  Discharge Recommendations: Home with: (vs. SNF)  DC Home with:: 24 Hour Supervision, Family Support, First Floor Setup, Home Occupational Therapy       2/21/24: Pt new admit to ARU. Current LOF listed above. Barriers to d/c include decreased strength throughout, decreased balance, impaired cognition including safety awareness, problem solving, attention, comprehension, expression, and short term memory, and decreased activity tolerance; all affect independence in self care and fxl transfers. Pt will participate in ADL training, therapeutic exercises and activities, fxl mobility/transfers, cognitive training, and activity tolerance in order to progress towards goals. Pt would benefit from continued skilled OT services in order to address listed barriers and prepare for safe d/c.     2/28/24: Pt's current LOF listed above. Continues with barriers to d/c of decreased strength throughout, decreased balance, impaired cognition including safety awareness, problem solving, attention, comprehension, expression, and short term memory, and decreased activity tolerance; all affect independence in self care and fxl transfers. Pt participating in ADL training, therapeutic exercises and activities, fxl mobility/transfers, cognitive training, and activity tolerance in order to progress towards goals. Pt would benefit from continued skilled OT services in order to address listed barriers and prepare for safe d/c.      3/6/24: Pt's current LOF listed above. Continues with barriers to d/c of decreased strength throughout, decreased balance, impaired cognition including safety awareness, problem solving, attention, comprehension, expression, and short term memory, and decreased activity tolerance; all affect independence in self care and fxl transfers. Pt participating in ADL training, therapeutic exercises and activities, fxl mobility/transfers, cognitive training, and activity tolerance in order  to progress towards goals. Pt would benefit from continued skilled OT services in order to address listed barriers and prepare for safe d/c.     3/13/24: Pt's current LOF listed above. Continues with barriers to d/c of decreased strength throughout, decreased balance, impaired cognition including safety awareness, problem solving, attention, comprehension, expression, and short term memory, and decreased activity tolerance; all affect independence in self care and fxl transfers. Pt participating in ADL training, therapeutic exercises and activities, fxl mobility/transfers, cognitive training, and activity tolerance in order to progress towards goals. Pt would benefit from continued skilled OT services in order to address listed barriers and prepare for safe d/c.     3/20/24: Pt's current LOF listed above. Continues with barriers to d/c of decreased strength throughout, decreased balance, impaired cognition including safety awareness, problem solving, attention, comprehension, expression, and short term memory, and decreased activity tolerance; all affect independence in self care and fxl transfers. Pt participating in ADL training, therapeutic exercises and activities, fxl mobility/transfers, cognitive training, and activity tolerance in order to progress towards goals. Pt would benefit from continued skilled OT services in order to address listed barriers and prepare for safe d/c.     3/27/24: Pt's current LOF listed above. Continues with barriers to d/c of decreased strength throughout, decreased balance, impaired cognition including safety awareness, problem solving, attention, comprehension, expression, and short term memory, and decreased activity tolerance; all affect independence in self care and fxl transfers. Pt participating in ADL training, therapeutic exercises and activities, fxl mobility/transfers, cognitive training, and activity tolerance in order to progress towards goals. Pt would benefit from  continued skilled OT services in order to address listed barriers and prepare for safe d/c.    4/3/24: Pt's current LOF listed above. Continues with barriers to d/c of decreased strength throughout, decreased balance, impaired cognition including safety awareness, problem solving, attention, comprehension, expression, and short term memory, and decreased activity tolerance; all affect independence in self care and fxl transfers. Pt participating in ADL training, therapeutic exercises and activities, fxl mobility/transfers, cognitive training, and activity tolerance in order to progress towards goals. Pt would benefit from continued skilled OT services in order to address listed barriers and prepare for safe d/c.    Speech Therapy:  Mode of Communication: Non-verbal  Speech/Language: Expressive Aphasia  Cognition: Exceptions to WNL  Cognition: Decreased Memory, Decreased Executive Functions, Decreased Attention, Decreased Comprehension, Decreased Safety  Orientation: Person, Time, Situation, Place (variable at times)  Swallowing: Exceptions to WNL  Swallowing: Oral Dysphagia, Pharyngeal Dysphagia, Aspiration Risk  Diet Recommendations: Level 1/Puree, [place] Thick  Discharge Recommendations:  (pending d/c home)  DC Home with:: 24 Hour Supervision, Family Support, Home Speech Therapy  2/21 per nursing patient coughing with thin liquids. Diet downgraded to nectar thick liquids, consult pending this date.     2/28     Cognitive Linguisitic Assessments   Cognitive Linquistic Quick Test (CLQT) BCAT 8/21- pt only able to participate via white board 2' hearing loss     Comprehension   Assist Devices Other  (cochlear implants)   Auditory    (white board for communication)   QI: Comprehension 2. Sometimes Understands: Understands only basic conversations or simple, direct phrases. Frequently requires cues to understand   Comprehension (FIM) 2 - Understands only simple expressions or gestures (waves, hello)   Expression    Non-Verbal Basic   QI: Expression 2. Frequently exhibits difficulty with expressing needs and ideas   Expression (FIM) 2 - Uses only simple expressions or gestures (waves, hello)   Social Interaction   Social Interaction (FIM) 2 - Interacts appropriately 25-49% of time   Problem Solving   Problem solving (FIM) 2 - Solves basic problems 25-49% of time   Memory   Memory (FIM) 2 - Recognizes, recalls/performs 25-49%   Memory Skills   Orientation Level Oriented to person   Consistencies Assessed and Performance   Materials Admnistered Puree/Level 1;Nectar thick liquid   Oral Stage Mild impaired   Phargngeal Stage Moderate impaired   Swallow Mechanics Mild delayed;Swallow initation;Weak larygneal rise;Aspiration risk   Strategies and Efficacy small bites, small sips, slow rate   Recommendations   Risk for Aspiration Present   Recommendations Consider oral diet   Diet Solid Recommendation Level 1 Dysphagia/pureed   Diet Liquid Recommendation Nectar thick liquid   Recommended Form of Meds Whole;With thick liquid   General Precautions Aspiration precautions;Feed only when alert;Minimize distractions;Upright as possible for all oral intake;Remain upright for 45 mins after meals   Compensatory Swallowing Strategies Alternate solids and liquids   Results Reviewed with PT/Family/Caregiver     Eating   Type of Assistance Needed Supervision  (visual cues)   Physical Assistance Level No physical assistance   Eating CARE Score 4       3/6 Continue puree and nectar thick liquids via 10CC cup and water via 5CC. Continue to abide by strict aspiration precautions. Plan to repeat VBS prior to discharge. Last VBS was 2/14/24.     3/13  Repeat VBS on 3/11/24 recommendations for pureed solids and nectar thick liquids. Overall concern for swallow decline from previous VBS on 2/14/24 with recommendations at that time for pureed solids and thin liquids. Sigificant pharyngeal residue with all consistencies is a concern for diet tolerance  across meals with increased aspiration risk. Strict aspiration precautions, swallow strategies and direct supervision with meals continues.     UPDATED FIM LEVELS   Comprehension   Comprehension (FIM) 3 - Understands basic info/conversation 50-74% of time  (via written direction in short phrases, gesture or lip reading)   Expression   Expression (FIM) 3 - Expresses basic info/needs 50-74% of time  (wants/needs via verbal 1-2 word phrases or gesture.)   Social Interaction   Social Interaction (FIM) 4 - Interacts 75-89% of time  (increased social greetings and attention throughout session.)   Problem Solving   Problem solving (FIM) 2 - Solves basic problems 25-49% of time   Memory   Memory (FIM) 2 - Recognizes, recalls/performs 25-49%     3/20 Sigificant pharyngeal residue with all consistencies is a concern for diet tolerance across meals with increased aspiration risk. Strict aspiration precautions, swallow strategies and direct supervision with meals continues. He is allowed water only via 5CC provalve cup or single controlled sips via straw ( pinching and removing ) to control rate.   UPDATED FIM LEVELS     Comprehension   Comprehension (FIM) 4 - Understands basic info/conversation 75-90% of time   Expression   Expression (FIM) 3 - Expresses basic info/needs 50-74% of time   Social Interaction   Social Interaction (FIM) 5 - Interacts appropriately with others 90% of time   Problem Solving   Problem solving (FIM) 3 - Solves basic problmes 50-74% of time   Memory   Memory (FIM) 3 - Recognizes, recalls/performs 50-74%   Memory Skills       3/27 continue same diet recs, pending discharge home with 24/7 supervision vs. SNF.     peech/Language/Cognition Assessmetn   Treatment Assessment attempted same low tech AAC trials as yesterday but with decreases field to F6. No initiation at first requirng max cues to get started with decreased cues to min.   Swallow Assessment   Swallow Treatment Assessment patient received OOB  in chair with wife, son and son's significant other present. Patient FDC through lunch tray. Inconsistent wet cough upon arrival. Trials of nectar thick liquid via 5 cc cup and via single controlled straw sips via pinching the straw and removing. Pt is not impulsive via straw. It is important to note that patient with increased delay in the initiation of the swallow with sometimes no initiation at all until visual/written cue up to 10 seconds. This is likely due to fatigue of the swallow. He would benefit from keeping meals no longer than 45 minutes. Family educated bedside.     UPDATED FIM LEVELS   Comprehension   Assist Devices Hearing Aid   Comprehension (FIM) 4 - Understands basic info/conversation 75-90% of time   Expression   Expression (FIM) 3 - Expresses basic info/needs 50-74% of time   Social Interaction   Social Interaction (FIM) 5 - Interacts appropriately with others 90% of time   Problem Solving   Problem solving (FIM) 3 - Solves basic problmes 50-74% of time   Memory   Memory (FIM) 3 - Recognizes, recalls/performs 50-74%     4/3 Pt continues with pureed solids and nectar thick liquids.     Comprehension   Assist Devices Hearing Aid   Comprehension (FIM) 4 - Understands basic info/conversation 75-90% of time   Expression   Expression (FIM) 3 - Expresses basic info/needs 50-74% of time   Social Interaction   Social Interaction (FIM) 5 - Interacts appropriately with others 90% of time   Problem Solving   Problem solving (FIM) 3 - Solves basic problmes 50-74% of time   Memory   Memory (FIM) 3 - Recognizes, recalls/performs 50-74%       Familiar low tech AAC board in room completed with written direction of 1 item at a time F9-12 items @ 30% silvia increased to 100% mod-max cue.     Recommendations   Diet Solid Recommendation Level 1 Dysphagia/pureed    Diet Liquid Recommendation Nectar thick liquid  (10cc provalve cup or single straw sips controlled by staff or wife who has been trained.) - can have  water only via 5CC cup or single straw controlled sips by staff or wife who has been trained.    Recommended Form of Meds Crushed;With puree   General Precautions Aspiration precautions;Minimize distractions;Upright as possible for all oral intake;Remain upright for 45 mins after meals;Supervision with meals   Compensatory Swallowing Strategies Alternate solids and liquids;External pacing  (small bites, slow rate, cue to double swallow. Try to keep meals under 1 hour due to fatigue as meals progres       Nursing Notes:  Appetite: Good  Diet Type: Dysphagia I, Nectar thick liquids                      Diet Patient/Family Education Complete: Yes    Type of Wound (LDA):  (allevyn to sacrum and heels preventative)                    Type of Wound Patient/Family Education: Yes  Bladder: Incontinent  Catheter Type: Brooks  Bladder Patient/Family Education: Yes  Bowel: Q 2 Hour Bowell and bladder program     Bowel Patient/Family Education: Yes  Pain Location/Orientation: Location: Generalized  Pain Score: Med Not Given for Pain - for MAR use only                       Hospital Pain Intervention(s): Medication (See MAR)  Pain Patient/Family Education: Yes  Medication Management/Safety  Injectable: Lovenox  Safe Administration: Yes  Reason for non-safe administration: not attempted  Medication Patient/Family Education Complete: Yes    2/27/2024  Pt. Admit after ICH/falls. Pt. Is deaf. Communicates via white board and handouts. Does occasionally say one or two words to questions asked, answers are appropriate. Requires close supervision  when eating and requires special cups to drink with- on nectar thick fluids.  Follows commands. Pills crushed and in applesauce. Has failed urinary retention protocol and has a brooks catheter in place now. Has had small amount of bleeding at meatus and in bag. Reported to earlier shift that tip of penis was irritated. Has not reported this to myself. Mediplex to sacrum and heels as prevention.    3/4/24 Pt On nectar thick  fluids uses special cups. Supervision for all meals wife is cleared by speech to sit with PT during meals. Brooks back in place failed voiding trial.     3/12/2024 A/O x 2-3, pt. Is starting to be more verbal and clearer with words. Communication done with white board. Had VBS done on 3-11, now on Modified Dysphagia diet 1-pureed with nectar thick fluids, medications crushed and given in applesauce. Failed urinary retention trial again and now has brooks in. Developed Hypotensive episodes which improved after fluids, currently receiving NSS at 75ml/hr. Preventive allevny on buttocks and both heels. Scrotum is pink, cream applied. Meds adjusted for on-going thrush. 4 SR up for safety and bed/tab alarms on.     3/18/2024  Uses white board for basic communication. A/O x 2-3. Dysphagia diet nectar thick fluids. Pt on antibiotics (Levaquin) x5 days. Preventative allevyn on heels and buttocks. Repo q 2hrs.     3/25/2024: Patient is A+Ox2-3. Patient utilizes white board for communication. Patient able to communicate needs with 2-word sentences at times. Dysphagia diet, nectar thick fluids, meds crushed in applesauce. Transfers with SV/close CG and ambulates to bathroom SV with RW. Patient can be impulsive, utilizing bed/chair alarms. Preventative allevyn on sacrum and bilateral heels. Brooks removed 3/22. Patient continues with PVR checks.     4/1/24 Patient is A+Ox2-3. Patient utilizes white board for communication. Patient able to communicate needs with 2-word sentences at times. Dysphagia diet, nectar thick fluids, meds crushed in applesauce. Transfers with SV/close CG and ambulates to bathroom SV with RW. Patient can be impulsive, utilizing bed/chair alarms. Preventative allevyn on sacrum and bilateral heels. Brooks removed 3/22.    Case Management:     Discharge Planning  Living Arrangements: Lives w/ Spouse/significant other  Support Systems: Spouse/significant other, Son,  Daughter  Assistance Needed: unknown  Type of Current Residence: Private residence  Current Home Care Services: No  Initial assessment & orientation to [facility] with Pt & his wife, who was visiting bedside. Pt had difficulty hearing this worker, but his wife expressed that she is in agreement with tentative team recommendations. Tx team meeting was held today & tentative target DC date is 3/8, with home health services (all services). Discussed role of team members & reviewed TX & DC planning with Pt & Pt's spouse, who expressed understanding & agreement. Pt's additional family also visiting today & also present when physician assessed Pt.  SW will continue to monitor & assist as needed with TX & DC planning.     2/28/24 - Tx team recommendations reviewed with patient & family, who expressed understanding & agreement. Target DC date is 3/8 with [facility] (all); a list of providers was provided to Pt & a referral will be made based on Pt preference (expressed interest in [facility] & [facility]). Pt's spouse inquiring if there may be an extension; discussed that this will be reviewed at the team meeting next week. Provided information regarding meal options. SW will continue to monitor & assist as needed with Tx & DC planning.    Is the patient actively participating in therapies? yes  List any modifications to the treatment plan: na    Barriers Interventions   Decreased safety, cog Cues, ADL, transfer, gait training   Incontinent at times  B and B training   Decreased balance ADL, transfer, gait training   dysphagia ST strategies, ADL/transfer/gait training,    puree with nectar thick liquids, water in sippy cups   Decreased strength Therapeutic exercise, therapeutic activity     Is the patient making expected progress toward goals? yes  List any update or changes to goals: na    Medical Goals: Patient will be able to manage medical conditions and comorbid conditions with medications and follow up upon completion of rehab  program    Weekly Team Goals:   Rehab Team Goals  ADL Team Goal: Patient will require supervision with ADLs with least restrictive device upon completion of rehab program  Transfer Team Goal: Patient will require assist with transfers with least restrictive device upon completion of rehab program  Locomotion Team Goal: Patient will require assist with locomotion with least restrictive device upon completion of rehab program  Cognitive Team Goal: Patient will return to premorbid level of cognitive activity upon completion of rehab program    S/CG bed mobility, transfers, and mobility  Min/mod assist self care  Min assist comprehension, expression, memory, and problem solving  Complete ongoing family training  Complete home eval    Health and wellness: to be able to return home and complete activities with family    Discussion: Plan for return home with family with [facility] for PT, OT, ST, nursing, and aides.  Team recommending that patient will require 24 hour assist and will benefit from additional aide services.      Anticipated Discharge Date:  April 5, 2024

## 2024-04-03 NOTE — PROGRESS NOTES
04/03/24 0900   Pain Assessment   Pain Assessment Tool 0-10   Pain Score No Pain   Restrictions/Precautions   Precautions Aphasia;Aspiration;Bed/chair alarms;Cognitive;Fall Risk;Hard of hearing;Impulsive;Supervision on toilet/commode   Cognition   Overall Cognitive Status Impaired   Arousal/Participation Alert;Responsive;Cooperative   Attention Attends with cues to redirect   Orientation Level Oriented to person;Oriented to place   Memory Decreased short term memory;Decreased recall of recent events;Decreased recall of precautions   Following Commands Follows one step commands with increased time or repetition   Sit to Stand   Type of Assistance Needed Incidental touching;Supervision   Comment cg/close S with RW and increased visual/tactile cues for sequence/technique/hand placement   Sit to Stand CARE Score 4   Bed-Chair Transfer   Type of Assistance Needed Incidental touching;Supervision   Comment cg/close S with RW and increased visual/tactile cues for sequence/technique/hand placement   Chair/Bed-to-Chair Transfer CARE Score 4   Walk 10 Feet   Type of Assistance Needed Incidental touching;Supervision   Comment cg/close S level and unlevel surfaces with RW; increased visual/tactile cues for sequence and direction   Walk 10 Feet CARE Score 4   Walk 50 Feet with Two Turns   Type of Assistance Needed Incidental touching;Supervision   Comment cg/close S level and unlevel surfaces with RW; increased visual/tactile cues for sequence and direction   Walk 50 Feet with Two Turns CARE Score 4   Walk 150 Feet   Type of Assistance Needed Incidental touching;Supervision   Comment cg/close S level and unlevel surfaces with RW; increased visual/tactile cues for sequence and direction   Walk 150 Feet CARE Score 4   Walking 10 Feet on Uneven Surfaces   Type of Assistance Needed Incidental touching;Supervision   Comment cg/close S level and unlevel surfaces with RW; increased visual/tactile cues for sequence and direction    Walking 10 Feet on Uneven Surfaces CARE Score 4   Ambulation   Primary Mode of Locomotion Prior to Admission Walk   Distance Walked (feet) 173 ft  (153' 173' 139')   Assist Device Roller Walker   Gait Pattern Inconsistant Maria Victoria;Slow Maria Victoria;Decreased foot clearance;Forward Flexion;Narrow ILIANA   Limitations Noted In Balance;Coordination;Device Management;Endurance;Posture;Safety;Speed;Strength   Provided Assistance with: Balance;Direction   Walk Assist Level Contact Guard;Close Supervision   Findings cg/close S level and unlevel surfaces with RW; increased visual/tactile cues for sequence and direction   Does the patient walk? 2. Yes   Curb or Single Stair   Style negotiated Single stair   Type of Assistance Needed Incidental touching   Comment cg using B/L  HR with cues for sequence/technique   1 Step (Curb) CARE Score 4   4 Steps   Type of Assistance Needed Incidental touching   Comment cg using B/L HR with cues for sequence/technique   4 Steps CARE Score 4   12 Steps   Type of Assistance Needed Incidental touching   Comment cg using B/L HR with cues for sequence/technique   12 Steps CARE Score 4   Stairs   Type Stairs;Ramp   # of Steps 14   Weight Bearing Precautions WBAT;Fall Risk   Assist Devices Bilateral Rail;Roller Walker   Findings cg using B/L HR with cues for sequence/technique; cg ramp with RW and cues for sequence   Therapeutic Interventions   Strengthening supine and seated ther ex   Balance gait and transfer training   Other ramp and stair negotiation   Assessment   Treatment Assessment Patient agreeble to therapy session.   No pain reported.   Increased coughing at beginning of session.  Nursing and ST aware.   Continues to require increased visual/tactile cues for sequence/technique/direction/hand placement for all tasks.  Improved hand placement during transfers noted today.   Completed ther ex for general LE strengthening; gait and transfer training focusing on sequence and technique for  improved balance and safety with functional mobility using walker.    Completed negotiation of ramp CG withRW and steps with 2 handrails CG.   PT Barriers   Physical Impairment Decreased strength;Decreased range of motion;Decreased endurance;Impaired balance;Decreased mobility;Decreased coordination;Decreased cognition;Impaired judgement;Decreased safety awareness;Impaired hearing   Functional Limitation Wheelchair management;Walking;Transfers;Standing;Stair negotiation;Ramp negotiation;Car transfers   Plan   Treatment/Interventions Functional transfer training;LE strengthening/ROM;Elevations;Therapeutic exercise;Gait training   Progress Progressing toward goals   PT Therapy Minutes   PT Time In 0900   PT Time Out 1025   PT Total Time (minutes) 85   PT Mode of treatment - Individual (minutes) 85   PT Mode of treatment - Concurrent (minutes) 0   PT Mode of treatment - Group (minutes) 0   PT Mode of treatment - Co-treat (minutes) 0   PT Mode of Treatment - Total time(minutes) 85 minutes   PT Cumulative Minutes 2342   Therapy Time missed   Time missed? No

## 2024-04-03 NOTE — CASE MANAGEMENT
Patient's daughter Allegra requested CM to contact [facility] home health aid agency , to investigate their availability and if they work with long term care insurances.  CM called [facility] [phone], spoke with Tierra, inquired if they had available staffing for d/c on Friday 4/5. Tierra stated  she had 2 potential new staff members, but was uncertain if their agency would be ready for Friday discharge. Tierra stated they have worked with Long term care insurances , including [employer].  Chely and Heather catalan from [facility] at 12:00, met with patient's wife Gina, son Oni, and daughter Allegra and this CM. Chely reviewed their processes, procedures,reimbursement billing policy, meaning, patient pays the agency, who then sends the bill to the long term care insurance company, and then the insurance company reimburses the patient/family. Chely also answered family questions. Chely gave CM her email, with request for recent medical notes and therapy [email or web address]. CM willsend information when this is finalized with family.  AS it stands now, [facility] will be out to [facility] tomorrow 4/4, do home eval, and family will sign paperwork, decide on definite amount of hours needed. [facility] will then start care on Friday night.  Therapists Brittney and Zulema did home eval at [facility] this afternoon.  CM received a voice mail message from Henny at [facility], stating they will have new employees, but would not get them oriented and ready to work in time for Friday discharge. They could start care on Monday 4/8. CM called and spoke with Henny, who confirmed they could not start care on Friday 4/5, but on Monday 4/8.  CM called daughter Allegra, made her aware of [facility] availability for Monday 4/8, unable to start care on Friday 4/5. Allegra stated she would like to go forward with [facility] at this time, she confirmed  Chely will be making home eval tomorrow and paperwork will be signed to start care on Friday overnight shift. Allegra appreciative of this information, knowing they do have this other agency as a back up.   CALVIN called [employer] and health, spoke with Lon, made him awre the home care agency for home health aides is [facility], gave contact information to Lon.  ,

## 2024-04-03 NOTE — PLAN OF CARE
Problem: PAIN - ADULT  Goal: Verbalizes/displays adequate comfort level or baseline comfort level  Description: Interventions:  - Encourage patient to monitor pain and request assistance  - Assess pain using appropriate pain scale  - Administer analgesics based on type and severity of pain and evaluate response  - Implement non-pharmacological measures as appropriate and evaluate response  - Consider cultural and social influences on pain and pain management  - Notify physician/advanced practitioner if interventions unsuccessful or patient reports new pain  Outcome: Progressing     Problem: INFECTION - ADULT  Goal: Absence or prevention of progression during hospitalization  Description: INTERVENTIONS:  - Assess and monitor for signs and symptoms of infection  - Monitor lab/diagnostic results  - Monitor all insertion sites, i.e. indwelling lines, tubes, and drains  - Monitor endotracheal if appropriate and nasal secretions for changes in amount and color  - [place]mith appropriate cooling/warming therapies per order  - Administer medications as ordered  - Instruct and encourage patient and family to use good hand hygiene technique  - Identify and instruct in appropriate isolation precautions for identified infection/condition  Outcome: Progressing  Goal: Absence of fever/infection during neutropenic period  Description: INTERVENTIONS:  - Monitor WBC    Outcome: Progressing     Problem: SAFETY ADULT  Goal: Patient will remain free of falls  Description: INTERVENTIONS:  - Educate patient/family on patient safety including physical limitations  - Instruct patient to call for assistance with activity   - Consult OT/PT to assist with strengthening/mobility   - Keep Call bell within reach  - Keep bed low and locked with side rails adjusted as appropriate  - Keep care items and personal belongings within reach  - Initiate and maintain comfort rounds  - Make Fall Risk Sign visible to staff  - Apply yellow socks and bracelet  for high fall risk patients  - Consider moving patient to room near nurses station  Outcome: Progressing  Goal: Maintain or return to baseline ADL function  Description: INTERVENTIONS:  -  Assess patient's ability to carry out ADLs; assess patient's baseline for ADL function and identify physical deficits which impact ability to perform ADLs (bathing, care of mouth/teeth, toileting, grooming, dressing, etc.)  - Assess/evaluate cause of self-care deficits   - Assess range of motion  - Assess patient's mobility; develop plan if impaired  - Assess patient's need for assistive devices and provide as appropriate  - Encourage maximum independence but intervene and supervise when necessary  - Involve family in performance of ADLs  - Assess for home care needs following discharge   - Consider OT consult to assist with ADL evaluation and planning for discharge  - Provide patient education as appropriate  Outcome: Progressing     Problem: DISCHARGE PLANNING  Goal: Discharge to home or other facility with appropriate resources  Description: INTERVENTIONS:  - Identify barriers to discharge w/patient and caregiver  - Arrange for needed discharge resources and transportation as appropriate  - Identify discharge learning needs (meds, wound care, etc.)  - Arrange for interpretive services to assist at discharge as needed  - Refer to Case Management Department for coordinating discharge planning if the patient needs post-hospital services based on physician/advanced practitioner order or complex needs related to functional status, cognitive ability, or social support system  Outcome: Progressing     Problem: Prexisting or High Potential for Compromised Skin Integrity  Goal: Skin integrity is maintained or improved  Description: INTERVENTIONS:  - Identify patients at risk for skin breakdown  - Assess and monitor skin integrity  - Assess and monitor nutrition and hydration status  - Monitor labs   - Assess for incontinence   - Turn  and reposition patient  - Assist with mobility/ambulation  - Relieve pressure over bony prominences  - Avoid friction and shearing  - Provide appropriate hygiene as needed including keeping skin clean and dry  - Evaluate need for skin moisturizer/barrier cream  - Collaborate with interdisciplinary team   - Patient/family teaching  - Consider wound care consult   Outcome: Progressing     Problem: Nutrition/Hydration-ADULT  Goal: Nutrient/Hydration intake appropriate for improving, restoring or maintaining nutritional needs  Description: Monitor and assess patient's nutrition/hydration status for malnutrition. Collaborate with interdisciplinary team and initiate plan and interventions as ordered.  Monitor patient's weight and dietary intake as ordered or per policy. Utilize nutrition screening tool and intervene as necessary. Determine patient's food preferences and provide high-protein, high-caloric foods as appropriate.     INTERVENTIONS:  - Monitor oral intake, urinary output, labs, and treatment plans  - Assess nutrition and hydration status and recommend course of action  - Evaluate amount of meals eaten  - Assist patient with eating if necessary   - Allow adequate time for meals  - Recommend/ encourage appropriate diets, oral nutritional supplements, and vitamin/mineral supplements  - Provide specific nutrition/hydration education as appropriate  - Include patient/family/caregiver in decisions related to nutrition  Outcome: Progressing

## 2024-04-04 LAB
DME PARACHUTE DELIVERY DATE ACTUAL: NORMAL
DME PARACHUTE DELIVERY DATE EXPECTED: NORMAL
DME PARACHUTE DELIVERY DATE REQUESTED: NORMAL
DME PARACHUTE ITEM DESCRIPTION: NORMAL
DME PARACHUTE ORDER STATUS: NORMAL
DME PARACHUTE SUPPLIER NAME: NORMAL
DME PARACHUTE SUPPLIER PHONE: NORMAL

## 2024-04-04 PROCEDURE — 99232 SBSQ HOSP IP/OBS MODERATE 35: CPT | Performed by: PHYSICAL MEDICINE & REHABILITATION

## 2024-04-04 PROCEDURE — 97110 THERAPEUTIC EXERCISES: CPT

## 2024-04-04 PROCEDURE — 97116 GAIT TRAINING THERAPY: CPT

## 2024-04-04 PROCEDURE — 97535 SELF CARE MNGMENT TRAINING: CPT

## 2024-04-04 PROCEDURE — 97530 THERAPEUTIC ACTIVITIES: CPT

## 2024-04-04 RX ADMIN — PRAVASTATIN SODIUM 40 MG: 40 TABLET ORAL at 16:14

## 2024-04-04 RX ADMIN — TAMSULOSIN HYDROCHLORIDE 0.4 MG: 0.4 CAPSULE ORAL at 16:14

## 2024-04-04 RX ADMIN — MIDODRINE HYDROCHLORIDE 5 MG: 5 TABLET ORAL at 16:14

## 2024-04-04 RX ADMIN — SENNOSIDES 8.6 MG: 8.6 TABLET, FILM COATED ORAL at 21:33

## 2024-04-04 RX ADMIN — MIDODRINE HYDROCHLORIDE 5 MG: 5 TABLET ORAL at 07:36

## 2024-04-04 RX ADMIN — ENOXAPARIN SODIUM 40 MG: 40 INJECTION SUBCUTANEOUS at 08:55

## 2024-04-04 RX ADMIN — Medication 9 MG: at 21:33

## 2024-04-04 RX ADMIN — SERTRALINE HYDROCHLORIDE 25 MG: 25 TABLET ORAL at 08:56

## 2024-04-04 RX ADMIN — CYANOCOBALAMIN TAB 500 MCG 500 MCG: 500 TAB at 08:56

## 2024-04-04 RX ADMIN — Medication 1 TABLET: at 08:56

## 2024-04-04 RX ADMIN — LORATADINE 10 MG: 10 TABLET ORAL at 08:56

## 2024-04-04 RX ADMIN — POLYETHYLENE GLYCOL 3350 17 G: 17 POWDER, FOR SOLUTION ORAL at 12:03

## 2024-04-04 RX ADMIN — MIDODRINE HYDROCHLORIDE 5 MG: 5 TABLET ORAL at 11:58

## 2024-04-04 RX ADMIN — ACETAMINOPHEN 650 MG: 325 TABLET ORAL at 21:33

## 2024-04-04 NOTE — DISCHARGE SUMMARY
Discharge Summary   Thomas Chase 83 y.o. male MRN: [id number]  Unit/Bed#: [facility] 213-02 Encounter: [id number]    Admission Date: 2/20/2024     Discharge Date:     Etiologic/Rehabilitation Diagnosis: Impairment of mobility, safety and Activities of Daily Living (ADLs) due to {REHAB ADMISSION INDICATION IP:3324781}    HPI: ***    Procedures Performed During [facility] Admission: ***    Complications: ***    Problem List/Comorbidities:    Assessment & plan notes cannot be loaded without a specified hospital service.      Discharge Physical Examination:      Physiatry Additions/Comorbidities:    Acute Rehabilitation Center Course: Patient participated in a comprehensive interdisciplinary inpatient rehabilitation program which included involvment of Rehab MD, therapies (PT, OT, and/or SLP), RN, CM, SW, dietary, and psychology services.     Significant Findings, Care, Treatment and Services Provided: Acute comprehensive interdisciplinary inpatient rehabilitation including PT, OT, SLP, RN, CM, SW, dietary, psychology, etc.    Functional Status Upon Admission to [facility]:***    Functional Status Upon Discharge from [facility]: ***    Discharge Diagnosis: Impairment of mobility, safety and Activities of Daily Living (ADLs) due to {REHAB ADMISSION INDICATION IP:2132948}    Discharge Medications:   See after visit summary for reconciled discharge medications provided to patient and family.      Condition at Discharge: {condition:84247}     Discharge instructions/Information to patient and family:   See after visit summary for information provided to patient and family.      Provisions for Follow-Up Care:  See after visit summary for information related to follow-up care and any pertinent home health orders.      Future Appointments   Date Time Provider Department Center   4/29/2024  2:00 PM Dani Worthington MD  [facility]   4/30/2024  9:40 AM RONY Diza  [facility]   5/21/2024  3:00 PM Barry Merchant. [facility]  [facility]   6/10/2024  3:00 PM Dani Worthington MD  [facility]   11/1/2024  9:00 AM RONY Diaz  [facility]       Disposition: {Discharge Disposition:64364}    Planned Readmission: No    Discharge Statement   I spent 45 minutes discharging the patient. This time was spent on the day of discharge. I had direct contact with the patient on the day of discharge. Greater than 50% of the total time was spent examining patient, answering all patient questions, arranging and discussing plan of care with patient as well as directly providing post-discharge instructions. Additional time then spent on discharge activities.    Discharge Medications:  See after visit summary for reconciled discharge medications provided to patient and family.      Facility Administered Medications Prior to Discharge:    Current Facility-Administered Medications   Medication Dose Route Frequency Provider Last Rate    acetaminophen  650 mg Oral Q6H PRN Daniel Clayton MD      bisacodyl  10 mg Rectal Daily PRN Darwin Paz MD      cyanocobalamin  500 mcg Oral Daily Darwin Paz MD      enoxaparin  40 mg Subcutaneous Q24H JEANNIE Alexandrea Lugo MD      lactulose  20 g Oral BID PRN Darwin Paz MD      lactulose  20 g Oral Once Alexandrea Lugo MD      loratadine  10 mg Oral Daily Amada L Cednall, PA-C      meclizine  25 mg Oral Q8H PRN Amada Rodriguezl, PA-C      melatonin  9 mg Oral HS Alexandrea Lugo MD      midodrine  5 mg Oral TID AC Amada L Jenniferl, PA-C      multivitamin-minerals  1 tablet Oral Daily Amada L Jenniferl, PA-C      polyethylene glycol  17 g Oral Daily PRN Ashley Depadua, MD      pravastatin  40 mg Oral Daily With Dinner Amada Epperson, PA-C      senna  1 tablet Oral HS Alexandrea Lugo MD      sertraline  25 mg Oral Daily Amada L Zeenat, PA-C      tamsulosin  0.4 mg Oral Daily With Dinner Darwin Paz MD

## 2024-04-04 NOTE — NURSING NOTE
Patient resting in bed at this time.  No signs of distress noted.  Bed alarm and q15 minute checks in place to promote patient safety.  Patient was incontinent of urine overnight with assist provided by nursing staff for toileting tasks.  Heels elevated off of bed on a pillow to promote skin integrity.  Call bell within reach.  Plan of care ongoing.

## 2024-04-04 NOTE — PROGRESS NOTES
04/04/24 0900   Pain Assessment   Pain Assessment Tool 0-10   Pain Score No Pain   Restrictions/Precautions   Precautions Aphasia;Aspiration;Bed/chair alarms;Cognitive;Fall Risk;Hard of hearing;Impulsive;Supervision on toilet/commode   Weight Bearing Restrictions No   ROM Restrictions No   Eating   Type of Assistance Needed Supervision   Physical Assistance Level No physical assistance   Comment cues to increase intake and to initiate double-swallow as pt with increased coughing this session >50% of bites; OT stopped intake after pt coughed with every bite after eating magic cup ice cream to prevent aspiration or discomfort   Eating CARE Score 4   Oral Hygiene   Type of Assistance Needed Supervision   Physical Assistance Level No physical assistance   Comment physical cues for thoroughness; pt noted to ? pocket vs. hold toothpaste in swallow stage as pt would dribble toothpaste and saliva out of mouth after oral care during rest of ADL session   Oral Hygiene CARE Score 4   Grooming   Able To Comb/Brush Hair;Wash/Dry Face;Brush/Clean Teeth   Limitation Noted In Problem Solving;Safety;Sequencing   Shower/Bathe Self   Type of Assistance Needed Physical assistance   Physical Assistance Level 26%-50%   Comment assist for buttocks, bilat lower LE, perineal thoroughness   Shower/Bathe Self CARE Score 3   Bathing   Assessed Bath Style Shower   Anticipated D/C Bath Style Shower;Sponge Bath   Able to Gather/Transport No   Able to Adjust Water Temperature No   Able to Wash/Rinse/Dry (body part) Left Arm;Right Arm;L Upper Leg;R Upper Leg;Chest;Abdomen   Limitations Noted in Balance;Endurance;Problem Solving;ROM;Safety;Sequencing   Positioning Seated;Standing   Adaptive Equipment Shower Bars;Shower Seat;Hand Held Shower   Tub/Shower Transfer   Limitations Noted In Balance;Endurance;Problem Solving;Safety;Sequencing;LE Strength   Adaptive Equipment Grab Bars;Seat with Back   Assessed Shower   Findings supervision with visual  cues for sequencing and safety   Upper Body Dressing   Type of Assistance Needed Physical assistance   Physical Assistance Level 25% or less   Comment assist to intiate doffing flannel shirt, don new flannel   Upper Body Dressing CARE Score 3   Lower Body Dressing   Type of Assistance Needed Physical assistance   Physical Assistance Level 76% or more   Comment able to pull pants up and down with physical cues from OT   Lower Body Dressing CARE Score 2   Putting On/Taking Off Footwear   Type of Assistance Needed Physical assistance   Physical Assistance Level 76% or more   Comment able to present bilat feet to OT to doff/don footwear   Putting On/Taking Off Footwear CARE Score 2   Dressing/Undressing Clothing   Remove UB Clothes Pullover Shirt;Jacket   Don UB Clothes Pullover Shirt;Jacket   Remove LB Clothes Pants;Undergarment;Socks   Don LB Clothes Pants;Undergarment;Socks;TEDs   Limitations Noted In Balance;Endurance;Problem Solving;Safety;Sequencing;ROM   Positioning Supported Sit   Sit to Lying   Comment pt returned self to bed when OT left room to collect exercise materials   Sit to Stand   Type of Assistance Needed Supervision   Physical Assistance Level No physical assistance   Comment occasional physical and visual cues for hand placement when pushing up   Sit to Stand CARE Score 4   Toileting Hygiene   Type of Assistance Needed Physical assistance   Physical Assistance Level 25% or less   Comment pt able to manage clothing over hips/buttocks with initial physical cue from OT   Toileting Hygiene CARE Score 3   Toileting   Able to Pull Clothing down yes, up yes.  (with physical cue initially from OT)   Manage Hygiene Bladder   Limitations Noted In Balance;Problem Solving;ROM;Safety;Sequencing;LE Strength   Adaptive Equipment Grab Bar  (RW)   Toilet Transfer   Type of Assistance Needed Supervision   Physical Assistance Level No physical assistance   Comment visual and physical cues for hand placement and  general safety   Toilet Transfer CARE Score 4   Toilet Transfer   Surface Assessed Raised Toilet   Transfer Technique Standard   Limitations Noted In Balance;Endurance;Problem Solving;Safety;Sequencing;LE Strength   Adaptive Equipment Grab Bar;Walker   Exercise Tools   Hand Gripper x30 digit flexion/extension in bilat hands using 3# digi flex   Other Exercise Tool 1 3x10 internal/external rotation and pronation/supination using bilat hands and yellow flexbar   Coordination   Fine Motor tied and untied knots in red tubing using bilat hands, increased cueing required for pt to attend to tasks   Cognition   Overall Cognitive Status Impaired   Arousal/Participation Alert;Responsive;Cooperative  (pt became more lethargic and slightly uncooperative by end of session 2* fatigue)   Attention Attends with cues to redirect   Orientation Level Oriented to person;Oriented to place;Oriented to situation  (oriented to month and year, cues for day of week)   Memory Decreased short term memory;Decreased recall of recent events;Decreased recall of precautions   Following Commands Follows one step commands with increased time or repetition   Assessment   Treatment Assessment Pt agreeable to OT session this AM despite appearing fatigued. Received sitting in recliner completing breakfast tray. Pt required increased time and cues for breakfast tray completion and was noted with increased cough as compared to previous sessions with cues to initiate double-swallow. ADL session then completed; current LOF and details litsed in respective sections. Pt overall maxA LB dressing, Brayan showering, UB dressing, and toileting, supervision grooming; fxl transfers and mobility with RW overall CG/supervision level with visual and physical cues for safety. Cueing also required for sequencing, problem solving, and safety during ADL tasks; pt's fatigue impacted task initiation more than in last few sessions. Pt returned to sidelying in bed after ADL  without assist from OT; completed fine motor, UE strength, and activity tolerance activities while in bed with consistent visual and physical cues to attend to tasks. Pt to be seen for final ADL session tomorrow AM prior to d/c. Pt's barriers to d/c include decreased strength throughout, decreased balance, impaired hearing, impaired cognition including safety awareness, problem solving, attention, comprehension, and expression, and decreased activity tolerance; all affect independence in self care and fxl transfers. Pt would benefit from continued skilled OT services in order to address listed barriers and prepare for safe d/c.'   Prognosis Fair   Problem List Decreased strength;Decreased range of motion;Decreased endurance;Impaired balance;Decreased coordination;Decreased cognition;Impaired judgement;Decreased safety awareness;Impaired hearing   Plan   Treatment/Interventions ADL retraining;Functional transfer training;LE strengthening/ROM;Therapeutic exercise;Endurance training;Cognitive reorientation;Patient/family training;Equipment eval/education;Compensatory technique education;OT   Progress Progressing toward goals   OT Therapy Minutes   OT Time In 0900   OT Time Out 1030   OT Total Time (minutes) 90   OT Mode of treatment - Individual (minutes) 90

## 2024-04-04 NOTE — PROGRESS NOTES
Progress Note - Thomas Chase 83 y.o. male MRN: [id number]    Unit/Bed#: [facility] 213-02 Encounter: [id number]        Subjective:   Patient seen and examined at bedside after reviewing the chart and discussing the case with the caring staff.      Patient examined at bedside.  Patient has no acute symptoms.  His cough has improved since yesterday.     Physical Exam   Vitals: Blood pressure 98/60, pulse 96, temperature 97.6 °F (36.4 °C), temperature source Temporal, resp. rate 16, height 6' (1.829 m), weight 57.9 kg (127 lb 10.3 oz), SpO2 92%.,Body mass index is 17.31 kg/m².  Constitutional: Patient in no acute distress.  HEENT: PERR, EOMI, MMM.  Cardiovascular: Normal rate and regular rhythm.    Pulmonary/Chest: Effort normal and breath sounds normal.   Abdomen: Soft, + BS, NT.    Assessment/Plan:  Thomas Chase is a(n) 83 y.o. year old male with left SDH and right SAH.     1.  Cardiac with hx of HTN, HLD/hypotension.  On pravastatin 40 mg daily (simvastatin nonformulary).  Noted to have low BP 3/10/24.  Patient's orthostatic vitals did not show significant drop.  Received IV fluids and started on midodrine.  Midodrine increased to 5 mg TID 3/21/24.  Continue to monitor.   2.  Allergic rhinitis.  Patient is on loratadine 10 mg daily.   3.  Depression/anxiety.  Patient is on Zoloft 25 mg daily.   4.  Insomnia.  Patient is on melatonin 6 mg at bedtime.   5.  Thrush.  Completed clotrimazole lozenges x 10 days on 3/21/24.   6.  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  Patient is mainly nonverbal.   7.  Urinary retention/UTI.  Urinary retention protocol.  Flomax decreased to 0.4 mg daily 3/12/2024 due to possible cause of hypotension.  Finley catheter placed 2/22/24, removed on 2/28/24, placed again 3/1/24, removed again 3/7/24 and reinserted 3/8/24.    Finley removed 3/22/24 and has been voiding well on own.   8.  Dysphagia.  Speech therapy following.  Dysphagia 1 puureed with nectar thick liquid.  Video  swallow study done on 3/11/2024.  9.  Vitamin B12 deficiency.  Patient started on vitamin B12 500 mcg daily.   10.  Pain and bowel management.  As per physiatrist.   11.  Intracranial hemorrhage.   Patient is receiving intensive PT OT ST as per physiatrist.     Anticipated discharge date:  TBD.   PCP:  RONY Diaz    The patient was discussed with Dr. Paz and he is in agreement with the above note.

## 2024-04-04 NOTE — OCCUPATIONAL THERAPY NOTE
Home evaluation completed with OT and PT at pt's home with pt's wife, daughter, and son present. OT and PT recommended grab bar placement in walk-in shower, removal of throw rugs in home, limitation of stair completion in home, placement of shower chair, overnight commode placement by bed, RW management with carpets, and supervisory schedule with family's and home care's assist. Pt's family receptive to all information and recommendations. D/c home eval to be completed on Friday upon pt's d/c as well.

## 2024-04-04 NOTE — PROGRESS NOTES
PM&R PROGRESS NOTE:  Thomas Chase 83 y.o. male MRN: [id number]  Unit/Bed#: -02 Encounter: [id number]        **CHANGE IN REHAB DIAGNOSIS FROM PRE-ADMISSION SCREEN  Rehab Diagnosis: Impairment of mobility, safety, Activities of Daily Living (ADLs), and cognitive/communication skills due to Brain Dysfunction:  02.22  Traumatic, Closed Injury  Etiologic Diagnosis: L SDH, R SAH, L anterior hypodensity lateral temporal lobe    HPI: Thomas Chase is a 83 y.o. male with past medical history significant for previous fall with resultant traumatic brain injury-subdural hematoma in 2022 status post left craniotomy, chronic aphasia, hard of hearing with cochlear implant in place, hyperlipidemia who presented to the [facility] on 2/13/2024 with increased weakness and mental status change for several days.  Unclear if patient had head trauma.  CT head showing a 3 mm left frontal subdural hemorrhage.  CT lumbar spine showing DJD.  Patient found to have dysphagia and was seen by speech therapy.  VFSS on 2/15/2024 cleared him for a puréed diet with thin liquids.  Patient was also started by psychiatry for depression and started on Zoloft 25 mg daily.  Patient sustained a rapid response and a unwitnessed fall with head trauma notable bump on head on 2/15/2024.  CT head showing a new small volume acute subarachnoid hemorrhage in the left frontal opercular region as well as new nondisplaced fracture of the zygomatic arch with soft tissue contusion, stable 3 mm subdural hemorrhage seen previously.  Patient transferred to [facility] for further evaluation.  Patient seen by neurology and neurosurgery.  CTA head and neck showing a left anterior hypodensity in the left anterior lateral temporal lobe with 2 punctate hyperdensities.  These represented possible nonhemorrhagic contusions or venous infarcts.  Less likely to represent ischemia or neoplasm.  Follow-up imaging recommended with CT head in 2-3 weeks.  CT  venogram showing patent dural venous sinuses.  Patient was cleared for DVT prophylaxis and started on Keppra for seizure prophylaxis.    Medical course complicated by suspected UTI based on UA and symptoms.  Patient was treated with antibiotics x 3 days.  After medical stabilization, patient found to have acute functional deficits from his baseline in mobility, self-care, speech swallow cognition therefore admitted to [facility] for acute inpatient rehabilitation.    SUBJECTIVE: Patient seen face to face with wife at bedside.  Doing well and very happy to be going home tomorrow.  Answered questions that wife had.  No new acute issues.      ASSESSMENT: Stable, progressing      PLAN:    Rehabilitation  Functional deficits: Aphasia, dysphagia, impaired cognition, impaired balance, impulsivity, poor safety awareness impaired mobility, self care    Continue current rehabilitation plan of care to maximize function.  Expected Discharge: Friday 4/4/24 with home care RN, PT, OT, SLP, Aide.   Arrangements for assistance in home being made by family and case management     Current Function:  Physical Therapy Occupational Therapy Speech Therapy   Weight Bearing Status: Weight Bearing as Tolerated  Transfers: Incidental Touching, Supervision  Bed Mobility: Incidental Touching, Supervision  Amulation Distance (ft): 212 feet  Ambulation: Incidental Touching  Assistive Device for Ambulation: Roller Walker  Wheelchair Mobility Distance: 133 ft  Wheelchair Mobility: Maximum Assistance (max visual and tactile cues)  Number of Stairs: 14  Assistive Device for Stairs: Bilateral Hand Rails  Stair Assistance: Incidental Touching  Ramp: Incidental Touching  Assistive Device for Ramp: Roller Walker  Discharge Recommendations: Home with:  DC Home with:: Family Support, First Floor Setup, Home Physical Therapy   Eating: Supervision  Grooming: Supervision  Bathing: Minimal Assistance  Bathing: Minimal Assistance  Upper Body Dressing:  Minimal Assistance  Lower Body Dressing: Maximum Assistance  Toileting: Maximum Assistance  Tub/Shower Transfer: Incidental Touching  Toilet Transfer: Supervision  Cognition: Exceptions to WNL  Cognition: Decreased Executive Functions, Decreased Attention, Decreased Comprehension, Decreased Safety  Orientation: Person, Place   Mode of Communication: Non-verbal  Speech/Language: Expressive Aphasia  Cognition: Exceptions to WNL  Cognition: Decreased Memory, Decreased Executive Functions, Decreased Attention, Decreased Comprehension, Decreased Safety  Orientation: Person, Time, Situation, Place (variable at times)  Swallowing: Exceptions to WNL  Swallowing: Oral Dysphagia, Pharyngeal Dysphagia, Aspiration Risk  Diet Recommendations: Level 1/Puree, [place] Thick  Discharge Recommendations:  (pending d/c home)  DC Home with:: 24 Hour Supervision, Family Support, Home Speech Therapy       DVT prophylaxis  Continue Lovenox - will NOT need at discharge      * Intracranial hemorrhage (HCC)-resolved as of 3/21/2024  Assessment & Plan  Initial presentation on 2/13/2024 with increased weakness in the legs, mental status changes for several days  CT head showing a 3 mm left frontal subdural hemorrhage  Rapid response 2/15/2024 with fall and head trauma  CT head showing a new small volume acute subarachnoid hemorrhage in the left frontal opercular region and new nondisplaced fracture of the zygomatic arch with soft tissue contusion.    Transferred to [facility]  CTA head and neck and and repeat CT head showing a left anterior hypodensity in the left anterior lateral temporal lobe with 2 punctate hyperdensities.  Differential diagnosis nonhemorrhagic contusions or venous infarcts.  Patient seen by neurology and neurosurgery  Conservative management  Cleared for DVT prophylaxis  Started on Keppra for seizure prophylaxis  Repeat CT head scheduled on Monday, 3/4/2024 -personally reviewed  Radiology read as follows:  Improving left anterior  temporal lobe contusions with resolving edema. Stable subacute subdural hemorrhage along the anterior left frontal convexity. Resolved subarachnoid and intraventricular hemorrhages. Unchanged minimal hyperdensity beneath the left craniotomy flap. Stable right parafalcine subdural hygroma. There is no new intra or extra-axial hemorrhage.  Follow-up with neurosurgery on 3/6/2024 virtually completed.  Recommendations as follows: Neurosurgery will sign off, patient will follow-up in 2 weeks with repeat CT head   CTH 3/16/24: No acute intracranial abnormality.Stable thin chronic left frontal subdural collection stable since 11/10/2023. Evaluation of the left temporal lobe is limited due to artifact from the cochlear implant but there may be mild developing encephalomalacia related to prior contusion.  Follow-up with neurosurgery and neurology in outpatient clinic (appointment is 3/21/24 with neurology in 3/22/2024 with neurosurgery)  Follow-up with Neurology in 3 months  Follow-up with Neurosurgery PRN    Behavior safety risk  Assessment & Plan  Doing well in ARC  Safety behavior plan going well.  Q15 min checks - at times impulsive getting up from bed, forgetful to use call bell, but does respond well to reminders.     Intermittently forgetful to use call bell, but usually does well with this.   Nursing and staff to provide close supervision near nursing station  Patient placed on 4 side rails (not as a restraint, patient and wife preference)  Monitor sleep-wake cycle - better overall     Patient demonstrating mild impulsivity in acute care only   Fall x 2 in acute care  Falls at home in the past    Insomnia  Assessment & Plan  Continue melatonin to 9 mg QHS    Hematuria  Assessment & Plan  RESOVLED  Started 3/2 - 3/3/2024  Minor, and more likely related to irritation from Finley catheter  Irrigation as needed per urology  Asymptomatic  H&H stable    Headache  Assessment & Plan  Resolved  Continue Tylenol 650 mg PRN      Severe protein-calorie malnutrition (HCC)  Assessment & Plan  BMI 17.31  Nutrition following  Optimize oral intake  Supplements ordered  He is eating % of meals  Hydration is variable  Maintain IV  IVF PRN  SLP working towards free water protocol    Dysphagia  Assessment & Plan  VFSS completed 2/15/2024  Cleared for a puréed diet with nectar thick liquids  SLP to follow while at [facility]  Repeat VFSS 3/11/24: Mild oral dysphagia, Moderate oral-pharyngeal dysphagia  Recommendations:  Diet: Dysphagia 1 pureed   Liquids: Nectar   Patient doing very well with oral food eating % of all of his meals.  Also gaining weight.  At times has difficulty with fluid intake due to nectar thick liquids and dysphagia.  Repeat swallow study recommended in 3-4 weeks  Supplement with IV fluids if needed  SLP also working towards a free water nectar thick protocol with oral care prior  Possible future consideration for PEG       Low BP  Assessment & Plan  Still low at times, but asymptomatic.  His normal appears to be SBP   Continue Midodrine 5 mg TID (3/21/24) per primary service.  Continue compression stockings during day.  Adequate hydration    BP lower since 3/10/2024  Compression stockings and abdominal binder ordered  Internal medicine ordering IV fluid bolus.  I continued IVF x 1 L which was completed  BP continues to be very low especially in standing.   Labs reviewed - nothing to explain low BP        Urinary retention  Assessment & Plan  Finley catheter removed on [facility]  Trial of void underway  Mixed pattern  Unfortunately required multiple straight catheterizations  Finley catheter replaced 3/1/24 per urology  Flomax reduced to 0.4 mg daily due to hypotension   Trial of void 3/7/2024 - failed this  Finley re-inserted 3/8/24  Patient likely to require longer time prior to removal of Finley catheter -recommend removal in 2 weeks around 3/22/2024  Finley removed and Trial of void started 3/22/2024  Slow in the beginning  however after good BM patient was starting to void more with lower PVRs.  Having both continent and incontinent episodes -mixed pattern  Timed toilet frequently  Bladder scans now for lack of void over shift       Constipation due to neurogenic bowel  Assessment & Plan  Patient started on more aggressive bowel program    Hearing loss  Assessment & Plan  Chronic hearing loss  Patient status post cochlear implant  MRI contraindicated    Hypercholesterolemia  Assessment & Plan  Continue simvastatin 20 mg daily (home dose)    UTI (urinary tract infection)-resolved as of 2/21/2024  Assessment & Plan  RESOLVED   Suspected UTI based on UA and symptoms in acute care  Completed 3 days IV antibiotics    In ARC:  UA was ordered by IM : equivocal - neg.   Culture was done by internal medicine.  Enterococcus faecalis  Likely colonized.  Asymptomatic.  Over the weekend, however, Dr. Paz did decide to initiate treatment with Levaquin x 5 days.          Appreciate IM consultants medical co-management.  Labs, medications, and imaging personally reviewed.      ROS:  A ten point review of systems was completed on 04/04/24 and pertinent positives are listed in subjective section. All other systems reviewed were negative.       OBJECTIVE:   BP 98/60 (BP Location: Right arm)   Pulse 96   Temp 97.6 °F (36.4 °C) (Temporal)   Resp 16   Ht 6' (1.829 m)   Wt 57.9 kg (127 lb 10.3 oz)   SpO2 92%   BMI 17.31 kg/m²       Physical Exam  Vitals and nursing note reviewed.   Constitutional:       General: He is not in acute distress.     Appearance: He is well-developed.   HENT:      Head: Normocephalic.      Ears:      Comments: Impaired hearing      Nose: Nose normal.   Eyes:      Conjunctiva/sclera: Conjunctivae normal.   Cardiovascular:      Rate and Rhythm: Normal rate and regular rhythm.      Pulses: Normal pulses.   Pulmonary:      Effort: Pulmonary effort is normal.      Breath sounds: Normal breath sounds. No wheezing.   Abdominal:       General: Bowel sounds are normal. There is no distension.      Palpations: Abdomen is soft.      Tenderness: There is no abdominal tenderness.   Musculoskeletal:         General: No swelling.      Cervical back: Neck supple.   Skin:     General: Skin is warm.   Neurological:      Mental Status: He is alert.      Comments: Aphasic but can express certain words and phrases   Comprehension overall intact   Balance is improving   Psychiatric:         Mood and Affect: Mood normal.          Lab Results   Component Value Date    WBC 5.93 04/02/2024    HGB 12.3 04/02/2024    HCT 39.3 04/02/2024    MCV 99 (H) 04/02/2024     (H) 04/02/2024     Lab Results   Component Value Date    SODIUM 138 04/02/2024    K 4.2 04/02/2024     04/02/2024    CO2 32 04/02/2024    BUN 16 04/02/2024    CREATININE 0.69 04/02/2024    GLUC 80 04/02/2024    CALCIUM 8.9 04/02/2024     Lab Results   Component Value Date    INR 1.02 06/02/2022    INR 1.10 05/29/2022    INR 1.04 05/28/2022    PROTIME 13.0 06/02/2022    PROTIME 13.7 05/29/2022    PROTIME 13.2 05/28/2022           Current Facility-Administered Medications:     acetaminophen (TYLENOL) tablet 650 mg, 650 mg, Oral, Q6H PRN, Daniel Clayton MD, 650 mg at 04/03/24 2115    bisacodyl (DULCOLAX) rectal suppository 10 mg, 10 mg, Rectal, Daily PRN, Darwin Paz MD, 10 mg at 03/22/24 1327    cyanocobalamin (VITAMIN B-12) tablet 500 mcg, 500 mcg, Oral, Daily, Darwin Paz MD, 500 mcg at 04/04/24 0856    enoxaparin (LOVENOX) subcutaneous injection 40 mg, 40 mg, Subcutaneous, Q24H JEANNIE, Alexandrea Lugo MD, 40 mg at 04/04/24 0855    lactulose (CHRONULAC) oral solution 20 g, 20 g, Oral, BID PRN, Darwin Paz MD, 20 g at 03/28/24 1131    lactulose (CHRONULAC) oral solution 20 g, 20 g, Oral, Once, Alexandrea Lugo MD    loratadine (CLARITIN) tablet 10 mg, 10 mg, Oral, Daily, Amada Epperson PA-C, 10 mg at 04/04/24 0856    meclizine (ANTIVERT) tablet 25 mg, 25 mg, Oral, Q8H PRN,  Amada Epperson, PA-C    melatonin tablet 9 mg, 9 mg, Oral, HS, Alexandrea Lugo MD, 9 mg at 04/03/24 2114    midodrine (PROAMATINE) tablet 5 mg, 5 mg, Oral, TID AC, Amadamarilu Coughlinnall, PA-C, 5 mg at 04/04/24 0736    multivitamin-minerals (CENTRUM) tablet 1 tablet, 1 tablet, Oral, Daily, Amadamarilu Coughlinnall, PA-C, 1 tablet at 04/04/24 0856    polyethylene glycol (MIRALAX) packet 17 g, 17 g, Oral, Daily PRN, Ashley Depadua, MD, 17 g at 03/21/24 1655    pravastatin (PRAVACHOL) tablet 40 mg, 40 mg, Oral, Daily With Dinner, Amada Epperson, PA-C, 40 mg at 04/03/24 1654    senna (SENOKOT) tablet 8.6 mg, 1 tablet, Oral, HS, Alexandrea Lugo MD, 8.6 mg at 04/03/24 2115    sertraline (ZOLOFT) tablet 25 mg, 25 mg, Oral, Daily, Amada Coughlinnall, PA-C, 25 mg at 04/04/24 0856    tamsulosin (FLOMAX) capsule 0.4 mg, 0.4 mg, Oral, Daily With Dinner, Darwin Paz MD, 0.4 mg at 04/03/24 1654    Past Medical History:   Diagnosis Date    Arthritis     Encounter for general adult medical examination without abnormal findings 03/20/2019    Fall 11/03/2022    Hyperlipidemia     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    Macular degeneration, wet (HCC)     Urinary retention 06/02/2022       Patient Active Problem List    Diagnosis Date Noted    Behavior safety risk 02/20/2024    Insomnia 03/21/2024    Abnormal CT of the head 03/21/2024    Hematuria 03/04/2024    Headache 02/20/2024    Bilateral lower extremity weakness 02/17/2024    Abnormal CT scan, lumbar spine 02/15/2024    Severe protein-calorie malnutrition (HCC) 02/14/2024    Debility 02/13/2024    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    Dysphagia 11/21/2023    Macular degeneration, age related 02/27/2023    H/O traumatic subdural hematoma 02/27/2023    Sensorineural hearing loss (SNHL), bilateral 08/18/2022    Balance disorder 06/28/2022    Abnormal echocardiogram 06/27/2022    Right bundle-branch block 06/27/2022    Low BP 06/19/2022    Diastolic dysfunction 06/19/2022    EKG  abnormalities 06/19/2022    Constipation due to neurogenic bowel 06/02/2022    Urinary retention 06/02/2022    SDH (subdural hematoma) ([facility]) 05/27/2022    Primary osteoarthritis of left knee 05/17/2022    Hygroma 04/26/2022    Right hand pain     Carpal tunnel syndrome on right     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      ** Please Note:  voice to text software may have been used in the creation of this document. Although proof errors in transcription or interpretation are a potential of such software**

## 2024-04-04 NOTE — NURSING NOTE
Tolerated therapy today, naps afterwards.  Wife visiting.  Pt aphasic, says one or two words.  Pt for discharge home tomorrow.  Same plan of care continued.  Safety checks q 15 minutes maintained.

## 2024-04-04 NOTE — PROGRESS NOTES
04/04/24 0655   Pain Assessment   Pain Assessment Tool 0-10   Pain Score No Pain   Restrictions/Precautions   Precautions Aphasia;Aspiration;Bed/chair alarms;Cognitive;Fall Risk;Hard of hearing;Impulsive;Supervision on toilet/commode   Weight Bearing Restrictions No   ROM Restrictions No   Cognition   Overall Cognitive Status Impaired   Arousal/Participation Alert;Responsive;Cooperative   Attention Attends with cues to redirect   Orientation Level Oriented to person;Oriented to place   Memory Decreased recall of precautions;Decreased recall of recent events;Decreased short term memory   Following Commands Follows one step commands with increased time or repetition   Roll Left and Right   Type of Assistance Needed Independent   Physical Assistance Level No physical assistance   Comment bed rail   Roll Left and Right CARE Score 6   Sit to Lying   Comment Pt did not return to bed   Lying to Sitting on Side of Bed   Type of Assistance Needed Supervision   Physical Assistance Level No physical assistance   Comment bed rail   Lying to Sitting on Side of Bed CARE Score 4   Sit to Stand   Type of Assistance Needed Incidental touching;Supervision   Physical Assistance Level No physical assistance   Comment CGA / Cl S RW; tactile and visual cues for hand placement   Sit to Stand CARE Score 4   Bed-Chair Transfer   Type of Assistance Needed Incidental touching;Supervision   Physical Assistance Level No physical assistance   Comment CGA / Cl S RW; tactile and visual cues for hand placement and sequencing   Chair/Bed-to-Chair Transfer CARE Score 4   Car Transfer   Reason if not Attempted Environmental limitations   Car Transfer CARE Score 10   Walk 10 Feet   Type of Assistance Needed Supervision   Physical Assistance Level No physical assistance   Comment Cl S RW; Tactile and visual cues for direction   Walk 10 Feet CARE Score 4   Walk 50 Feet with Two Turns   Type of Assistance Needed Supervision   Physical Assistance Level  No physical assistance   Comment Cl S RW; Tactile and visual cues for direction   Walk 50 Feet with Two Turns CARE Score 4   Walk 150 Feet   Type of Assistance Needed Supervision   Physical Assistance Level No physical assistance   Comment Cl S RW; Tactile and visual cues for direction   Walk 150 Feet CARE Score 4   Walking 10 Feet on Uneven Surfaces   Type of Assistance Needed Supervision   Physical Assistance Level No physical assistance   Comment Cl S RW on green foam   Walking 10 Feet on Uneven Surfaces CARE Score 4   Ambulation   Primary Mode of Locomotion Prior to Admission Walk   Distance Walked (feet) 180 ft  (180', 132')   Assist Device Roller Walker   Gait Pattern Inconsistant Maria Victoria;Slow Maria Victoria;Decreased foot clearance;Forward Flexion;Narrow ILIANA;Shuffle   Limitations Noted In Balance;Coordination;Device Management;Endurance;Heel Strike;Posture;Safety;Speed;Strength   Provided Assistance with: Balance;Direction   Walk Assist Level Close Supervision   Findings Cl S RW; Tactile and visual cues for direction   Does the patient walk? 2. Yes   Wheel 50 Feet with Two Turns   Reason if not Attempted Activity not applicable   Wheel 50 Feet with Two Turns CARE Score 9   Wheel 150 Feet   Reason if not Attempted Activity not applicable   Wheel 150 Feet CARE Score 9   Wheelchair mobility   Findings N/A   Curb or Single Stair   Style negotiated Single stair   Type of Assistance Needed Supervision   Physical Assistance Level No physical assistance   Comment Cl S up/down 14 steps with B/L HR; visual cues for sequence   1 Step (Curb) CARE Score 4   4 Steps   Type of Assistance Needed Supervision   Physical Assistance Level No physical assistance   Comment Cl S up/down 14 steps with B/L HR; visual cues for sequence   4 Steps CARE Score 4   12 Steps   Type of Assistance Needed Supervision   Physical Assistance Level No physical assistance   Comment Cl S up/down 14 steps with B/L HR; visual cues for sequence   12 Steps  "CARE Score 4   Stairs   Type Ramp;Curb   Weight Bearing Precautions WBAT;Fall Risk   Assist Devices Roller Walker   Findings CGA ramp RW; Brayan RW 8\" curb step; visual and tactile cueing for sequencing   Toilet Transfer   Type of Assistance Needed Supervision   Physical Assistance Level No physical assistance   Comment RW; tactile and visual cues for sequence   Toilet Transfer CARE Score 4   Therapeutic Interventions   Other bed mobility, transfer training, gait training, stair training, curb step, ramp negotiation   Equipment Use   NuStep L2, 0.62 mi, 15 min   Assessment   Treatment Assessment Pt agreeable to PT this AM, received supine in bed. Pt able to negotiate 8\" curb step x 2 this session with Brayan for safety with RW, with improved sequencing on second attempt. Pt demonstrated improved sequencing and visual/tactile command following with functional mobility this session. Will continue with current PT POC to improve deficits and promote return to PLOF.   Problem List Decreased strength;Decreased range of motion;Decreased endurance;Impaired balance;Decreased mobility;Decreased cognition;Impaired judgement;Decreased safety awareness;Impaired hearing   PT Barriers   Physical Impairment Decreased strength;Decreased range of motion;Decreased endurance;Impaired balance;Decreased mobility;Decreased coordination;Decreased cognition;Impaired judgement;Decreased safety awareness;Impaired hearing   Functional Limitation Wheelchair management;Walking;Transfers;Standing;Stair negotiation;Ramp negotiation;Car transfers   Plan   Treatment/Interventions Functional transfer training;LE strengthening/ROM;Therapeutic exercise;Endurance training;Cognitive reorientation;Patient/family training;Equipment eval/education;Bed mobility;Gait training   Progress Progressing toward goals   PT Therapy Minutes   PT Time In 0655   PT Time Out 0825   PT Total Time (minutes) 90   PT Mode of treatment - Individual (minutes) 90   PT Mode of " treatment - Concurrent (minutes) 0   PT Mode of treatment - Group (minutes) 0   PT Mode of treatment - Co-treat (minutes) 0   PT Mode of Treatment - Total time(minutes) 90 minutes   PT Cumulative Minutes 2402     Patient remains OOB in chair, all needs in reach.  Alarm in place and activated.  Encouraged use of call bell, patient verbalizes understanding.

## 2024-04-05 VITALS
RESPIRATION RATE: 16 BRPM | SYSTOLIC BLOOD PRESSURE: 90 MMHG | OXYGEN SATURATION: 95 % | TEMPERATURE: 97.7 F | HEIGHT: 72 IN | BODY MASS INDEX: 17.29 KG/M2 | HEART RATE: 98 BPM | WEIGHT: 127.65 LBS | DIASTOLIC BLOOD PRESSURE: 62 MMHG

## 2024-04-05 PROCEDURE — 97537 COMMUNITY/WORK REINTEGRATION: CPT

## 2024-04-05 PROCEDURE — 97110 THERAPEUTIC EXERCISES: CPT

## 2024-04-05 PROCEDURE — 92526 ORAL FUNCTION THERAPY: CPT | Performed by: NURSE PRACTITIONER

## 2024-04-05 PROCEDURE — 97542 WHEELCHAIR MNGMENT TRAINING: CPT

## 2024-04-05 PROCEDURE — 97530 THERAPEUTIC ACTIVITIES: CPT

## 2024-04-05 PROCEDURE — 97116 GAIT TRAINING THERAPY: CPT

## 2024-04-05 PROCEDURE — 97535 SELF CARE MNGMENT TRAINING: CPT

## 2024-04-05 RX ORDER — TAMSULOSIN HYDROCHLORIDE 0.4 MG/1
0.4 CAPSULE ORAL
Qty: 30 CAPSULE | Refills: 0 | Status: SHIPPED | OUTPATIENT
Start: 2024-04-05 | End: 2024-04-18

## 2024-04-05 RX ORDER — SENNOSIDES 8.6 MG
8.6 TABLET ORAL
Qty: 30 TABLET | Refills: 0 | Status: SHIPPED | OUTPATIENT
Start: 2024-04-05

## 2024-04-05 RX ORDER — LANOLIN ALCOHOL/MO/W.PET/CERES
9 CREAM (GRAM) TOPICAL
Qty: 90 TABLET | Refills: 0 | Status: SHIPPED | OUTPATIENT
Start: 2024-04-05

## 2024-04-05 RX ORDER — LORATADINE 10 MG/1
10 TABLET ORAL DAILY
Qty: 30 TABLET | Refills: 0 | Status: SHIPPED | OUTPATIENT
Start: 2024-04-05

## 2024-04-05 RX ORDER — POLYETHYLENE GLYCOL 3350 17 G/17G
17 POWDER, FOR SOLUTION ORAL DAILY PRN
Qty: 30 EACH | Refills: 0 | Status: SHIPPED | OUTPATIENT
Start: 2024-04-05

## 2024-04-05 RX ORDER — SIMVASTATIN 20 MG
20 TABLET ORAL
Qty: 30 TABLET | Refills: 0 | Status: SHIPPED | OUTPATIENT
Start: 2024-04-05

## 2024-04-05 RX ORDER — SERTRALINE HYDROCHLORIDE 25 MG/1
25 TABLET, FILM COATED ORAL DAILY
Qty: 30 TABLET | Refills: 0 | Status: SHIPPED | OUTPATIENT
Start: 2024-04-05

## 2024-04-05 RX ORDER — ACETAMINOPHEN 325 MG/1
650 TABLET ORAL EVERY 6 HOURS PRN
Start: 2024-04-05

## 2024-04-05 RX ORDER — MIDODRINE HYDROCHLORIDE 5 MG/1
5 TABLET ORAL
Qty: 90 TABLET | Refills: 0 | Status: SHIPPED | OUTPATIENT
Start: 2024-04-05

## 2024-04-05 RX ORDER — MULTIVITAMIN
1 TABLET ORAL DAILY
Qty: 30 TABLET | Refills: 0 | Status: SHIPPED | OUTPATIENT
Start: 2024-04-05

## 2024-04-05 RX ORDER — LANOLIN ALCOHOL/MO/W.PET/CERES
9 CREAM (GRAM) TOPICAL
Qty: 90 TABLET | Refills: 0 | Status: SHIPPED | OUTPATIENT
Start: 2024-04-05 | End: 2024-04-05

## 2024-04-05 RX ADMIN — SERTRALINE HYDROCHLORIDE 25 MG: 25 TABLET ORAL at 08:07

## 2024-04-05 RX ADMIN — MIDODRINE HYDROCHLORIDE 5 MG: 5 TABLET ORAL at 07:21

## 2024-04-05 RX ADMIN — MIDODRINE HYDROCHLORIDE 5 MG: 5 TABLET ORAL at 11:41

## 2024-04-05 RX ADMIN — LORATADINE 10 MG: 10 TABLET ORAL at 08:07

## 2024-04-05 RX ADMIN — CYANOCOBALAMIN TAB 500 MCG 500 MCG: 500 TAB at 08:06

## 2024-04-05 RX ADMIN — Medication 1 TABLET: at 08:06

## 2024-04-05 NOTE — PROGRESS NOTES
04/05/24 1330   Pain Assessment   Pain Assessment Tool 0-10   Pain Score No Pain   Restrictions/Precautions   Precautions Aphasia;Aspiration;Bed/chair alarms;Cognitive;Fall Risk;Hard of hearing;Impulsive;Supervision on toilet/commode   Cognition   Overall Cognitive Status Impaired   Arousal/Participation Alert;Responsive;Cooperative   Attention Attends with cues to redirect   Orientation Level Oriented to person;Oriented to place   Memory Decreased short term memory;Decreased recall of precautions   Following Commands Follows one step commands with increased time or repetition   Roll Left and Right   Type of Assistance Needed Supervision   Comment bed rail   Roll Left and Right CARE Score 4   Sit to Lying   Type of Assistance Needed Supervision   Comment bed rail; once in bed may need increased cues or assist to get closer to head of bed   Sit to Lying CARE Score 4   Lying to Sitting on Side of Bed   Type of Assistance Needed Supervision   Comment bed rail   Lying to Sitting on Side of Bed CARE Score 4   Sit to Stand   Type of Assistance Needed Incidental touching;Supervision   Comment close S with RW from bed and wheelchair; cg/occ min A from recliner and couch; recommend cushion for recliner and couch; increased visual/tactile cues for sequence/technique/hand placement   Sit to Stand CARE Score 4   Bed-Chair Transfer   Type of Assistance Needed Incidental touching   Comment cg with RW; increased visual/tactile cues for sequence/technique/hand placement   Chair/Bed-to-Chair Transfer CARE Score 4   Car Transfer   Type of Assistance Needed Incidental touching   Comment cg; increased cues for sequence/technique   Car Transfer CARE Score 4   Walk 10 Feet   Type of Assistance Needed Incidental touching   Comment cg level and unlevel surfaces with RW; increased visual/tactile cues for sequence/direction   Walk 10 Feet CARE Score 4   Walk 50 Feet with Two Turns   Type of Assistance Needed Incidental touching   Comment  cg level and unlevel surfaces with RW; increased visual/tactile cues for sequence/direction   Walk 50 Feet with Two Turns CARE Score 4   Walking 10 Feet on Uneven Surfaces   Type of Assistance Needed Incidental touching   Comment cg level and unlevel surfaces with RW; increased visual/tactile cues for sequence/direction   Walking 10 Feet on Uneven Surfaces CARE Score 4   Ambulation   Primary Mode of Locomotion Prior to Admission Walk   Distance Walked (feet)   (25-75' around house/in and out of rooms)   Assist Device Roller Walker   Gait Pattern Inconsistant Maria Victoria;Slow Maria Victoria;Decreased foot clearance;Forward Flexion;Narrow ILIANA   Limitations Noted In Balance;Coordination;Device Management;Posture;Safety;Speed;Strength   Provided Assistance with: Balance;Direction   Walk Assist Level Contact Guard   Findings cg level and unlevel surfaces with RW;  increased visual/tactile cues for sequence/direction   Does the patient walk? 2. Yes   Curb or Single Stair   Style negotiated Curb   Type of Assistance Needed Incidental touching;Physical assistance   Physical Assistance Level Total assistance   Comment min A 2 curb steps using RW to enter house; will need second person to come out of house to help manage RW;  patient also has a side entrance with 2 steps and 1 HR; recommend second handrail and second person to take walker up and down steps   1 Step (Curb) CARE Score 1   Stairs   Type Stairs;Curb   # of Steps 2   Weight Bearing Precautions WBAT;Fall Risk   Assist Devices Single Rail;Roller Walker   Findings min A 2 curb steps using RW to enter house; will need second person to come out of house to help manage RW; patient also has a side entrance with 2 steps and 1 HR; recommend second handrail and second person to take walker up and down steps   Therapeutic Interventions   Balance gait and transfer training   Other stair negotiation   Assessment   Treatment Assessment Patient agreeable to therapy session.  Completed home  eval with patient, family, and OT.    Home eval took total of 1 hour and 15 min.   Reviewed gait, transfers, stair negotiation with patient and family.  Please refer to home evalutation for complete details.  Recommend 24 hr/day supervision/assistance, cushion for recliner/couch, bells/chimes/camera to keep and eye on/hear patient when resting, second HR for side steps.  Other considerations for the furture include lift chair and stair glide to go to basement and up to second floor.   Family/Caregiver Present Wife, Son, Dtr   PT Family training done with: PT/OT   PT Barriers   Physical Impairment Decreased strength;Decreased range of motion;Decreased endurance;Impaired balance;Decreased mobility;Decreased coordination;Impaired judgement;Decreased safety awareness;Impaired hearing   Functional Limitation Car transfers;Ramp negotiation;Stair negotiation;Standing;Transfers;Walking;Wheelchair management   Plan   Treatment/Interventions Functional transfer training;Elevations;Bed mobility;Gait training   Progress Progressing toward goals   PT Therapy Minutes   PT Time In 1330   PT Time Out 1415   PT Total Time (minutes) 45   PT Mode of treatment - Individual (minutes) 45   PT Mode of treatment - Concurrent (minutes) 0   PT Mode of treatment - Group (minutes) 0   PT Mode of treatment - Co-treat (minutes) 0   PT Mode of Treatment - Total time(minutes) 45 minutes   PT Cumulative Minutes 2280   Therapy Time missed   Time missed? No

## 2024-04-05 NOTE — CASE MANAGEMENT
CM Called [facility]  ,changed home care agency to In home referral. CALVIN also called [facility], to inform of home health aid agency, spoke with Shayy, who reported the home care agency needs to make the referral and contact [facility]. CALVIN made Shayy aware Luis Akers will be calling them.CALVIN called In home referral [phone], spoke with Luis, gave her the policy #'s for both LTC insurance policies.Luis unsure if she can bill both policies simultaneously, she will contact daughter Allegra to discuss which policy to bill. Luis stated start of care will be Monday overnight shift, at which time paperwork will be signed. CALVIN made Luis aware Allegra stated she hoped to be signing paperwork today 4/5. Luis stated she will call Allegra and clarify this, as well as discuss the long term care policy insurance billing.   Patient discharging to home with wife, son Oni and daughter Allegra. Equipment being delivered to patient's home. [facility] referral and contact information for outpatient dietician on discharge instructions.

## 2024-04-05 NOTE — PLAN OF CARE
Problem: PAIN - ADULT  Goal: Verbalizes/displays adequate comfort level or baseline comfort level  Description: Interventions:  - Encourage patient to monitor pain and request assistance  - Assess pain using appropriate pain scale  - Administer analgesics based on type and severity of pain and evaluate response  - Implement non-pharmacological measures as appropriate and evaluate response  - Consider cultural and social influences on pain and pain management  - Notify physician/advanced practitioner if interventions unsuccessful or patient reports new pain  Outcome: Progressing     Problem: SAFETY ADULT  Goal: Patient will remain free of falls  Description: INTERVENTIONS:  - Educate patient/family on patient safety including physical limitations  - Instruct patient to call for assistance with activity   - Consult OT/PT to assist with strengthening/mobility   - Keep Call bell within reach  - Keep bed low and locked with side rails adjusted as appropriate  - Keep care items and personal belongings within reach  - Initiate and maintain comfort rounds  - Make Fall Risk Sign visible to staff  - Offer Toileting every 2 Hours, in advance of need  - Initiate/Maintain chair/bed alarm  - Apply yellow socks and bracelet for high fall risk patients  - Patient room near nurses station  - 15 minute checks maintained  Outcome: Progressing

## 2024-04-05 NOTE — NURSING NOTE
Interdisciplinary care team deemed safest transportation home is via car. D/C instructions, medications, follow up appts and order for 24 hr care reviewed with pt, wife, son and daughter. All questions answered. Pts escorted by PT/OT and family via WC to car.

## 2024-04-05 NOTE — CASE MANAGEMENT
CALVIN received text from nursing and daughter Allegra while CM at [facility] [employer] course in [place], requesting a phone call to Allegra. CM called Allegra, who reported she further contacted in home referral, and confirmed they are unable to start care until Monday overnight shift. Allegra stated they are going forward with meeting with [employer] this afternoon for home eval and to sign paperwork, to start care Friday overnight shift.  CM then received a text from Allegra stating [employer] was an hour late for their meeting, and she was getting no responses from text messages or phone call to Chely from [employer] [facility]. Allegra informed this CM she fired [employer] [facility], she called their office to make them aware. Allegra stated she is going forward with In home referral . CM returned text inquiring if family is covering the patient over the weekend and starting with In home referral for Monday overnight shift, or is In home referral starting Friday night. Allegra replied, family will be covering the weekend, and In home will start Monday night shift. Allegra requesting CM to call in home referral in Am to give them necessary information, with hopes Allegra will be able to sign paperwork tomorrow 4/5.

## 2024-04-05 NOTE — PROGRESS NOTES
04/05/24 0700   Pain Assessment   Pain Assessment Tool 0-10   Pain Score No Pain   Restrictions/Precautions   Precautions Aphasia;Aspiration;Bed/chair alarms;Cognitive;Fall Risk;Hard of hearing;Impulsive;Supervision on toilet/commode   Weight Bearing Restrictions No   ROM Restrictions No   Oral Hygiene   Type of Assistance Needed Supervision   Physical Assistance Level No physical assistance   Comment physical cues for thoroughness   Oral Hygiene CARE Score 4   Grooming   Able To Comb/Brush Hair;Wash/Dry Face;Brush/Clean Teeth   Limitation Noted In Problem Solving;Safety;Sequencing   Shower/Bathe Self   Type of Assistance Needed Physical assistance   Physical Assistance Level 26%-50%   Comment assist for buttocks, bilat lower LE, perineal thoroughness   Shower/Bathe Self CARE Score 3   Bathing   Assessed Bath Style Sponge Bath   Anticipated D/C Bath Style Sponge Bath;Shower   Able to Gather/Transport No   Able to Wash/Rinse/Dry (body part) Left Arm;Right Arm;R Upper Leg;L Upper Leg;Chest;Abdomen   Limitations Noted in Balance;Endurance;Problem Solving;ROM;Safety;Sequencing   Positioning Seated;Standing   Upper Body Dressing   Type of Assistance Needed Physical assistance   Physical Assistance Level 26%-50%   Comment assist to initiate doffing flannel shirt and t-shirt, don new t-shirt over RUE, don new flannel   Upper Body Dressing CARE Score 3   Lower Body Dressing   Type of Assistance Needed Physical assistance   Physical Assistance Level 76% or more   Comment able to pull clothing over hips when standing   Lower Body Dressing CARE Score 2   Putting On/Taking Off Footwear   Type of Assistance Needed Physical assistance   Physical Assistance Level 76% or more   Comment able to present bilat feet to OT to doff/don footwear   Putting On/Taking Off Footwear CARE Score 2   Dressing/Undressing Clothing   Remove UB Clothes Pullover Shirt;Jacket   Don UB Clothes Pullover Shirt;Jacket   Remove LB Clothes  Undergarment;Socks   Don LB Clothes Pants;Undergarment;Socks;TEDs   Limitations Noted In Balance;Endurance;Problem Solving;Safety;Sequencing;ROM   Positioning Supported Sit   Lying to Sitting on Side of Bed   Type of Assistance Needed Incidental touching   Comment CG   Lying to Sitting on Side of Bed CARE Score 4   Sit to Stand   Type of Assistance Needed Supervision   Physical Assistance Level No physical assistance   Sit to Stand CARE Score 4   Toileting Hygiene   Type of Assistance Needed Physical assistance   Physical Assistance Level 26%-50%   Comment physical cues for clothing management   Toileting Hygiene CARE Score 3   Toileting   Able to Pull Clothing down yes, up yes.   Manage Hygiene Bladder   Limitations Noted In Balance;Problem Solving;ROM;Safety;Sequencing;LE Strength   Adaptive Equipment Grab Bar  (RW)   Toilet Transfer   Type of Assistance Needed Supervision   Physical Assistance Level No physical assistance   Comment physical and visual cues for safety and sequencing   Toilet Transfer CARE Score 4   Toilet Transfer   Surface Assessed Raised Toilet   Transfer Technique Standard   Limitations Noted In Balance;Endurance;Problem Solving;ROM;Safety;LE Strength;Sequencing   Adaptive Equipment Grab Bar;Walker   Exercise Tools   Other Exercise Tool 1 card matching activity using RUE to place cards in matching pockets, increased time to complete however 100% accuracy   Cognition   Overall Cognitive Status Impaired   Arousal/Participation Alert;Responsive;Cooperative   Attention Attends with cues to redirect   Orientation Level Oriented to person;Oriented to place;Oriented to situation   Memory Decreased short term memory;Decreased recall of recent events;Decreased recall of precautions   Following Commands Follows one step commands with increased time or repetition   Assessment   Treatment Assessment Pt agreeable to OT session this AM. Received lying supine in bed. ADL session completed; current LOF and  details listed in respective sections. Pt is overall maxA LB dressing, Brayan bathing, toileting, and UB dressing, supervision oral care; fxl transfers and mobility with RW overall supervision with visual and physical cues for safety and sequencing. Cognitive/actiivty tolerance activity completed after ADL with good tolerance. Pt did require rest breaks during activities 2* generalized fatigue and was able to verbalize same to OT. Supervision for breakfast tray required with cues for increased intake; coughing noted less this session than in previous sessions, overall after approx 25% of bites. Pt is set to d/c to home with family assist and HHC services later this AM; home eval to be completed by OT and PT at d/c.   Discharge Recommendation   Discharge Summary Pt cleared for d/c from [facility] to home with family assist and HHC services. Pt met all ADL goals with the exception of LB dressing; pt overall supervision/Brayan for ADL tasks and fxl transfers with the exception of maxA LB dressing. Pt participated in ADL training, therapeutic exercises and activities, fxl mobility/transfers, cognitive training, and activity tolerance in order to progress towards goals. DME is in place at home; w/c and commode have been obtained. Barriers of decreased balance, impaired cognition including sequencing, problem solving, and attention, dysphagia, and decreased activity tolerance remain.   OT Therapy Minutes   OT Time In 0700   OT Time Out 0830   OT Total Time (minutes) 90   OT Mode of treatment - Individual (minutes) 90

## 2024-04-05 NOTE — PROGRESS NOTES
04/05/24 1415   Pain Assessment   Pain Assessment Tool 0-10   Pain Score No Pain   Restrictions/Precautions   Precautions Aphasia;Aspiration;Bed/chair alarms;Cognitive;Fall Risk;Hard of hearing;Impulsive;Supervision on toilet/commode   Weight Bearing Restrictions No   ROM Restrictions No   Tub/Shower Transfer   Limitations Noted In Balance;Problem Solving;Safety;Sequencing;LE Strength   Adaptive Equipment Grab Bars;Seat with Back   Assessed Shower   Findings pt, OT, and family practiced shower transfer in pt's home walk-in shower, used 3 in 1 commode as shower seat after practicing with regular shower seat and having more success wiht 3 in 1; grab bars placed in shower walls, pt able to use wall outside of shower to balance when transferring as well; recommended pt's family provide close supervision/CG when pt enters and exits shower and do not take RW with pt as there is not enough space; pt and family demonstrated this with good success   Sit to Stand   Type of Assistance Needed Supervision   Physical Assistance Level No physical assistance   Comment physical cues for hand placement on all surfaces   Sit to Stand CARE Score 4   Toileting Hygiene   Type of Assistance Needed Physical assistance   Physical Assistance Level 51%-75%   Comment pt with difficulty managing clothing and required assist from family   Toileting Hygiene CARE Score 2   Toileting   Able to Pull Clothing down no, up no.   Manage Hygiene Bladder   Limitations Noted In Balance;Problem Solving;Safety;Sequencing;LE Strength   Adaptive Equipment   (RW, commode arm rests)   Toilet Transfer   Type of Assistance Needed Incidental touching   Comment CG provided from pt's family with physical and visual cues for safety   Toilet Transfer CARE Score 4   Toilet Transfer   Surface Assessed Raised Toilet   Transfer Technique Standard   Limitations Noted In Balance;Endurance;Problem Solving;Safety;Sequencing;LE Strength   Adaptive Equipment Walker   Kitchen  Mobility   Kitchen-Mobility Level Walker   Kitchen Mobility Comments pt completed fxl mobility with RW to w/c placed at countertop where pt will be eating meals, completed with close supervision and visual cues for safety   Cognition   Overall Cognitive Status Impaired   Arousal/Participation Alert;Responsive;Cooperative   Attention Attends with cues to redirect   Orientation Level Oriented to person;Oriented to place   Memory Decreased short term memory;Decreased recall of precautions   Following Commands Follows one step commands with increased time or repetition   Assessment   Treatment Assessment Pt, OT, PT, and pt's family completed home eval at pt's d/c; see respective sections and home eval sheet for details. Recommendations regarding bathroom set up, transfers, supervision, and home modifications given including extra grab bars outside of shower, bell/noisemaker placed on pt's bed to alert when moving if resting, cushion for recliner for ease in sit>stand transfer, non-slip mat outside of shower, and 24 hour supervision for all tasks. Pt's family receptive to information during eval. Home eval 1hr15m from start to finish with PT completing first 45min and OT completing last 30min.   OT Therapy Minutes   OT Time In 1415   OT Time Out 1445   OT Total Time (minutes) 30   OT Mode of treatment - Individual (minutes) 30     Pt's home eval completed from [id number] overall

## 2024-04-05 NOTE — PHYSICAL THERAPY NOTE
PT DISCHARGE SUMMARY:      Patient has met max benefit for inpatient [facility] PT.  Patient cg/S bed mobility, cg/close S transfers with walker, cg/close S ambulation up to 254' level and unlevel surfaces with walker, cg ramp with walker, cg/close S steps with 2 handrails (does not have to do at home; first floor set up), min A curb step (second person needed to place walker), cg steps with single rail (to enter house; second person needed to take walker up/down steps).  Multiple family training sessions completed while on unit.  Home evaluation completed today upon discharge.  Refer to home evaluation for specific details.  Remains limited by decreased ROM/strength, decreased balance and safety, decreased endurance, impaired hearing, impaired cognition, and impulsivity.  For d/c to home with 24 hour assistance and continued PT services as needed.  Patient completed car transfer with [facility].

## 2024-04-05 NOTE — PROGRESS NOTES
04/05/24 1000   Pain Assessment   Pain Assessment Tool 0-10   Pain Score No Pain   Restrictions/Precautions   Precautions Aphasia;Aspiration;Bed/chair alarms;Cognitive;Fall Risk;Hard of hearing;Impulsive;Supervision on toilet/commode   Cognition   Overall Cognitive Status Impaired   Arousal/Participation Alert;Responsive;Cooperative   Attention Attends with cues to redirect   Orientation Level Oriented to person;Oriented to place   Memory Decreased short term memory;Decreased recall of recent events;Decreased recall of precautions   Following Commands Follows one step commands with increased time or repetition   Sit to Stand   Type of Assistance Needed Supervision   Comment close S with RW; increased visual/tactile cues for sequence/technique/hand placement   Sit to Stand CARE Score 4   Bed-Chair Transfer   Type of Assistance Needed Incidental touching   Comment cg SPT with RW; increased visual/tactile cues for sequence/technique/hand placement   Chair/Bed-to-Chair Transfer CARE Score 4   Walk 10 Feet   Type of Assistance Needed Incidental touching;Supervision   Comment cg/close S level and unlevel surfaces with RW; increased visual/tactile cues for sequence/direction   Walk 10 Feet CARE Score 4   Walk 50 Feet with Two Turns   Type of Assistance Needed Incidental touching;Supervision   Comment cg/close S level and unlevel surfaces with RW; increased visual/tactile cues for sequence/direction   Walk 50 Feet with Two Turns CARE Score 4   Walk 150 Feet   Type of Assistance Needed Incidental touching;Supervision   Comment cg/close S level and unlevel surfaces with RW; increased visual/tactile cues for sequence/direction   Walk 150 Feet CARE Score 4   Walking 10 Feet on Uneven Surfaces   Type of Assistance Needed Incidental touching;Supervision   Comment cg/close S level and unlevel surfaces with RW; increased visual/tactile cues for sequence/direction   Walking 10 Feet on Uneven Surfaces CARE Score 4   Ambulation  "  Primary Mode of Locomotion Prior to Admission Walk   Distance Walked (feet) 254 ft  (254' 138' 153')   Assist Device Roller Walker   Gait Pattern Inconsistant Maria Victoria;Slow Maria Victoria;Decreased foot clearance;Forward Flexion;Narrow ILIANA   Limitations Noted In Balance;Coordination;Device Management;Posture;Safety;Speed;Strength   Provided Assistance with: Balance;Direction   Walk Assist Level Contact Guard;Close Supervision   Findings cg/close S level and unlevel surfaces with RW; increased visual/tactile cues for sequence/direction   Does the patient walk? 2. Yes   Wheel 50 Feet with Two Turns   Type of Assistance Needed Physical assistance   Physical Assistance Level 26%-50%   Comment min-mod A level and unlevel surfaces; max cues for sequence/technique   Wheel 50 Feet with Two Turns CARE Score 3   Wheel 150 Feet   Type of Assistance Needed Physical assistance   Physical Assistance Level 26%-50%   Comment min-mod A level and unlevel surfaces; max cues for sequence/technique   Wheel 150 Feet CARE Score 3   Wheelchair mobility   Does the patient use a wheelchair? 1. Yes   Type of Wheelchair Used 1. Manual   Method Right upper extremity;Left upper extremity;Right lower extremity;Left lower extremity   Assistance Provided For Locking Brakes;Obstacles;Remove Leg Rest;Replace Leg Rest;Remove armrests;Replace armrests   Distance Level Surface (feet) 156 ft   Distance Wheeled 3% Grade 12 ft   Findings min-mod A level and unlevel surfaces; max cues for sequence/technique   Curb or Single Stair   Style negotiated Curb   Type of Assistance Needed Physical assistance   Physical Assistance Level 26%-50%   Comment min A negotiation of 8\" curb step; increased cues for sequence/technique   1 Step (Curb) CARE Score 3   4 Steps   Type of Assistance Needed Supervision   Comment close S with 2 HRs; cues for sequence/technique   4 Steps CARE Score 4   12 Steps   Type of Assistance Needed Supervision   Comment close S with 2 HRs; cues for " "sequence/technique   12 Steps CARE Score 4   Stairs   Type Stairs;Curb;Ramp   # of Steps 14  (1-8\" curb step)   Weight Bearing Precautions WBAT;Fall Risk   Assist Devices Bilateral Rail;Roller Walker   Findings close S with 2 HRs; cues for sequence/technique; min A negotiation of 8\" curb step; increased cues for sequence/technique; cg/min A ramp with RW (increased assistance today as patient with foot caught in walker)   Picking Up Object   Reason if not Attempted Safety concerns   Picking Up Object CARE Score 88   Toilet Transfer   Type of Assistance Needed Supervision   Comment close S with RW/grab bar; cues for sequence/technique/ hand placement   Toilet Transfer CARE Score 4   Therapeutic Interventions   Strengthening seated ther ex   Balance gait and transfer training   Other ramp, curb, steps, wheelchair mobility   Assessment   Treatment Assessment Patient agreeable to therapy session.     No pain reported.   Continues to require increased visual/tactile cues for sequence/technique/direction/hand placement for all tasks.   Completed ther ex for general LE strengthening; gait and transfer training focusing on sequence and technique for improved balance and safety with functional mobility using walker.   Patient requires min-mod A wheelchair mobility with max cues.  Negotiated ramp cg/MIN A (foot caught in walker), MIN A curb step with MAX cues, close S steps with 2 handrails.    For home eval later at discharge.   PT Barriers   Physical Impairment Decreased strength;Decreased range of motion;Decreased endurance;Impaired balance;Decreased mobility;Decreased coordination;Impaired judgement;Decreased safety awareness;Impaired hearing   Functional Limitation Car transfers;Ramp negotiation;Stair negotiation;Standing;Transfers;Walking;Wheelchair management   Plan   Treatment/Interventions Functional transfer training;LE strengthening/ROM;Elevations;Therapeutic exercise;Gait training   Progress Progressing toward " goals   PT Therapy Minutes   PT Time In 1000   PT Time Out 1110   PT Total Time (minutes) 70   PT Mode of treatment - Individual (minutes) 70   PT Mode of treatment - Concurrent (minutes) 0   PT Mode of treatment - Group (minutes) 0   PT Mode of treatment - Co-treat (minutes) 0   PT Mode of Treatment - Total time(minutes) 70 minutes   PT Cumulative Minutes 2235   Therapy Time missed   Time missed? No

## 2024-04-06 ENCOUNTER — APPOINTMENT (EMERGENCY)
Dept: RADIOLOGY | Facility: HOSPITAL | Age: 84
DRG: 177 | End: 2024-04-06
Payer: MEDICARE

## 2024-04-06 ENCOUNTER — HOSPITAL ENCOUNTER (INPATIENT)
Facility: HOSPITAL | Age: 84
LOS: 4 days | Discharge: HOME HEALTH | DRG: 177 | End: 2024-04-10
Attending: EMERGENCY MEDICINE | Admitting: INTERNAL MEDICINE
Payer: MEDICARE

## 2024-04-06 DIAGNOSIS — G47.00 INSOMNIA: ICD-10-CM

## 2024-04-06 DIAGNOSIS — R53.1 GENERALIZED WEAKNESS: ICD-10-CM

## 2024-04-06 DIAGNOSIS — N39.0 UTI (URINARY TRACT INFECTION): Primary | ICD-10-CM

## 2024-04-06 DIAGNOSIS — J69.0 ASPIRATION PNEUMONIA (HCC): ICD-10-CM

## 2024-04-06 PROBLEM — R82.90 ABNORMAL URINALYSIS: Status: ACTIVE | Noted: 2024-04-06

## 2024-04-06 PROBLEM — N40.0 BENIGN PROSTATIC HYPERPLASIA WITHOUT LOWER URINARY TRACT SYMPTOMS: Status: ACTIVE | Noted: 2024-04-06

## 2024-04-06 LAB
ALBUMIN SERPL BCP-MCNC: 3.7 G/DL (ref 3.5–5)
ALP SERPL-CCNC: 101 U/L (ref 34–104)
ALT SERPL W P-5'-P-CCNC: 36 U/L (ref 7–52)
AMORPH PHOS CRY URNS QL MICRO: ABNORMAL /HPF
ANION GAP SERPL CALCULATED.3IONS-SCNC: 8 MMOL/L (ref 4–13)
APTT PPP: 28 SECONDS (ref 23–37)
AST SERPL W P-5'-P-CCNC: 25 U/L (ref 13–39)
BACTERIA UR QL AUTO: ABNORMAL /HPF
BASOPHILS # BLD AUTO: 0.03 THOUSANDS/ÂΜL (ref 0–0.1)
BASOPHILS NFR BLD AUTO: 0 % (ref 0–1)
BILIRUB SERPL-MCNC: 0.5 MG/DL (ref 0.2–1)
BILIRUB UR QL STRIP: NEGATIVE
BUN SERPL-MCNC: 21 MG/DL (ref 5–25)
CALCIUM SERPL-MCNC: 9.1 MG/DL (ref 8.4–10.2)
CHLORIDE SERPL-SCNC: 101 MMOL/L (ref 96–108)
CLARITY UR: ABNORMAL
CO2 SERPL-SCNC: 29 MMOL/L (ref 21–32)
COLOR UR: YELLOW
CREAT SERPL-MCNC: 0.69 MG/DL (ref 0.6–1.3)
EOSINOPHIL # BLD AUTO: 0.03 THOUSAND/ÂΜL (ref 0–0.61)
EOSINOPHIL NFR BLD AUTO: 0 % (ref 0–6)
ERYTHROCYTE [DISTWIDTH] IN BLOOD BY AUTOMATED COUNT: 14.1 % (ref 11.6–15.1)
FLUAV RNA RESP QL NAA+PROBE: NEGATIVE
FLUBV RNA RESP QL NAA+PROBE: NEGATIVE
GFR SERPL CREATININE-BSD FRML MDRD: 87 ML/MIN/1.73SQ M
GLUCOSE SERPL-MCNC: 99 MG/DL (ref 65–140)
GLUCOSE UR STRIP-MCNC: NEGATIVE MG/DL
HCT VFR BLD AUTO: 41.6 % (ref 36.5–49.3)
HGB BLD-MCNC: 13.1 G/DL (ref 12–17)
HGB UR QL STRIP.AUTO: NEGATIVE
IMM GRANULOCYTES # BLD AUTO: 0.05 THOUSAND/UL (ref 0–0.2)
IMM GRANULOCYTES NFR BLD AUTO: 1 % (ref 0–2)
INR PPP: 0.96 (ref 0.84–1.19)
KETONES UR STRIP-MCNC: NEGATIVE MG/DL
LACTATE SERPL-SCNC: 1.5 MMOL/L (ref 0.5–2)
LEUKOCYTE ESTERASE UR QL STRIP: NEGATIVE
LYMPHOCYTES # BLD AUTO: 0.79 THOUSANDS/ÂΜL (ref 0.6–4.47)
LYMPHOCYTES NFR BLD AUTO: 7 % (ref 14–44)
MCH RBC QN AUTO: 31.1 PG (ref 26.8–34.3)
MCHC RBC AUTO-ENTMCNC: 31.5 G/DL (ref 31.4–37.4)
MCV RBC AUTO: 99 FL (ref 82–98)
MONOCYTES # BLD AUTO: 0.85 THOUSAND/ÂΜL (ref 0.17–1.22)
MONOCYTES NFR BLD AUTO: 8 % (ref 4–12)
MUCOUS THREADS UR QL AUTO: ABNORMAL
NEUTROPHILS # BLD AUTO: 9.29 THOUSANDS/ÂΜL (ref 1.85–7.62)
NEUTS SEG NFR BLD AUTO: 84 % (ref 43–75)
NITRITE UR QL STRIP: POSITIVE
NON-SQ EPI CELLS URNS QL MICRO: ABNORMAL /HPF
NRBC BLD AUTO-RTO: 0 /100 WBCS
PH UR STRIP.AUTO: 7.5 [PH]
PLATELET # BLD AUTO: 437 THOUSANDS/UL (ref 149–390)
PMV BLD AUTO: 9.3 FL (ref 8.9–12.7)
POTASSIUM SERPL-SCNC: 4.6 MMOL/L (ref 3.5–5.3)
PROCALCITONIN SERPL-MCNC: 0.26 NG/ML
PROT SERPL-MCNC: 7.5 G/DL (ref 6.4–8.4)
PROT UR STRIP-MCNC: NEGATIVE MG/DL
PROTHROMBIN TIME: 13 SECONDS (ref 11.6–14.5)
RBC # BLD AUTO: 4.21 MILLION/UL (ref 3.88–5.62)
RBC #/AREA URNS AUTO: ABNORMAL /HPF
RSV RNA RESP QL NAA+PROBE: NEGATIVE
SARS-COV-2 RNA RESP QL NAA+PROBE: NEGATIVE
SODIUM SERPL-SCNC: 138 MMOL/L (ref 135–147)
SP GR UR STRIP.AUTO: 1.02
TSH SERPL DL<=0.05 MIU/L-ACNC: 4.96 UIU/ML (ref 0.45–4.5)
UROBILINOGEN UR QL STRIP.AUTO: 4 E.U./DL
WBC # BLD AUTO: 11.04 THOUSAND/UL (ref 4.31–10.16)
WBC #/AREA URNS AUTO: ABNORMAL /HPF

## 2024-04-06 PROCEDURE — 85610 PROTHROMBIN TIME: CPT | Performed by: EMERGENCY MEDICINE

## 2024-04-06 PROCEDURE — 0241U HB NFCT DS VIR RESP RNA 4 TRGT: CPT | Performed by: EMERGENCY MEDICINE

## 2024-04-06 PROCEDURE — 96365 THER/PROPH/DIAG IV INF INIT: CPT

## 2024-04-06 PROCEDURE — 96375 TX/PRO/DX INJ NEW DRUG ADDON: CPT

## 2024-04-06 PROCEDURE — 84145 PROCALCITONIN (PCT): CPT | Performed by: EMERGENCY MEDICINE

## 2024-04-06 PROCEDURE — 80053 COMPREHEN METABOLIC PANEL: CPT | Performed by: EMERGENCY MEDICINE

## 2024-04-06 PROCEDURE — 96361 HYDRATE IV INFUSION ADD-ON: CPT

## 2024-04-06 PROCEDURE — 99223 1ST HOSP IP/OBS HIGH 75: CPT | Performed by: INTERNAL MEDICINE

## 2024-04-06 PROCEDURE — 99285 EMERGENCY DEPT VISIT HI MDM: CPT

## 2024-04-06 PROCEDURE — 85025 COMPLETE CBC W/AUTO DIFF WBC: CPT | Performed by: EMERGENCY MEDICINE

## 2024-04-06 PROCEDURE — 85730 THROMBOPLASTIN TIME PARTIAL: CPT | Performed by: EMERGENCY MEDICINE

## 2024-04-06 PROCEDURE — 93005 ELECTROCARDIOGRAM TRACING: CPT

## 2024-04-06 PROCEDURE — 84443 ASSAY THYROID STIM HORMONE: CPT | Performed by: EMERGENCY MEDICINE

## 2024-04-06 PROCEDURE — 87040 BLOOD CULTURE FOR BACTERIA: CPT | Performed by: EMERGENCY MEDICINE

## 2024-04-06 PROCEDURE — 84439 ASSAY OF FREE THYROXINE: CPT | Performed by: EMERGENCY MEDICINE

## 2024-04-06 PROCEDURE — 36415 COLL VENOUS BLD VENIPUNCTURE: CPT | Performed by: EMERGENCY MEDICINE

## 2024-04-06 PROCEDURE — 81001 URINALYSIS AUTO W/SCOPE: CPT | Performed by: EMERGENCY MEDICINE

## 2024-04-06 PROCEDURE — 71045 X-RAY EXAM CHEST 1 VIEW: CPT

## 2024-04-06 PROCEDURE — 83605 ASSAY OF LACTIC ACID: CPT | Performed by: EMERGENCY MEDICINE

## 2024-04-06 PROCEDURE — 99285 EMERGENCY DEPT VISIT HI MDM: CPT | Performed by: EMERGENCY MEDICINE

## 2024-04-06 RX ORDER — CEFTRIAXONE 1 G/50ML
1000 INJECTION, SOLUTION INTRAVENOUS EVERY 24 HOURS
Status: DISCONTINUED | OUTPATIENT
Start: 2024-04-07 | End: 2024-04-10

## 2024-04-06 RX ORDER — TAMSULOSIN HYDROCHLORIDE 0.4 MG/1
0.4 CAPSULE ORAL
Status: DISCONTINUED | OUTPATIENT
Start: 2024-04-06 | End: 2024-04-10 | Stop reason: HOSPADM

## 2024-04-06 RX ORDER — SODIUM CHLORIDE, SODIUM LACTATE, POTASSIUM CHLORIDE, CALCIUM CHLORIDE 600; 310; 30; 20 MG/100ML; MG/100ML; MG/100ML; MG/100ML
75 INJECTION, SOLUTION INTRAVENOUS CONTINUOUS
Status: DISCONTINUED | OUTPATIENT
Start: 2024-04-06 | End: 2024-04-07

## 2024-04-06 RX ORDER — LORATADINE 10 MG/1
10 TABLET ORAL DAILY
Status: DISCONTINUED | OUTPATIENT
Start: 2024-04-07 | End: 2024-04-06

## 2024-04-06 RX ORDER — MIDODRINE HYDROCHLORIDE 5 MG/1
5 TABLET ORAL
Status: DISCONTINUED | OUTPATIENT
Start: 2024-04-07 | End: 2024-04-10 | Stop reason: HOSPADM

## 2024-04-06 RX ORDER — SERTRALINE HYDROCHLORIDE 25 MG/1
25 TABLET, FILM COATED ORAL DAILY
Status: DISCONTINUED | OUTPATIENT
Start: 2024-04-07 | End: 2024-04-10 | Stop reason: HOSPADM

## 2024-04-06 RX ORDER — POLYETHYLENE GLYCOL 3350 17 G/17G
17 POWDER, FOR SOLUTION ORAL DAILY PRN
Status: DISCONTINUED | OUTPATIENT
Start: 2024-04-06 | End: 2024-04-09

## 2024-04-06 RX ORDER — CEFTRIAXONE 1 G/50ML
1000 INJECTION, SOLUTION INTRAVENOUS ONCE
Status: COMPLETED | OUTPATIENT
Start: 2024-04-06 | End: 2024-04-06

## 2024-04-06 RX ORDER — LANOLIN ALCOHOL/MO/W.PET/CERES
9 CREAM (GRAM) TOPICAL
Status: DISCONTINUED | OUTPATIENT
Start: 2024-04-06 | End: 2024-04-06

## 2024-04-06 RX ORDER — ACETAMINOPHEN 325 MG/1
650 TABLET ORAL EVERY 6 HOURS PRN
Status: DISCONTINUED | OUTPATIENT
Start: 2024-04-06 | End: 2024-04-10 | Stop reason: HOSPADM

## 2024-04-06 RX ORDER — KETOROLAC TROMETHAMINE 30 MG/ML
15 INJECTION, SOLUTION INTRAMUSCULAR; INTRAVENOUS ONCE
Status: COMPLETED | OUTPATIENT
Start: 2024-04-06 | End: 2024-04-06

## 2024-04-06 RX ORDER — PRAVASTATIN SODIUM 40 MG
40 TABLET ORAL
Status: DISCONTINUED | OUTPATIENT
Start: 2024-04-06 | End: 2024-04-10 | Stop reason: HOSPADM

## 2024-04-06 RX ORDER — METRONIDAZOLE 500 MG/100ML
500 INJECTION, SOLUTION INTRAVENOUS EVERY 8 HOURS
Status: DISCONTINUED | OUTPATIENT
Start: 2024-04-06 | End: 2024-04-06

## 2024-04-06 RX ORDER — METRONIDAZOLE 500 MG/100ML
500 INJECTION, SOLUTION INTRAVENOUS EVERY 8 HOURS
Status: DISCONTINUED | OUTPATIENT
Start: 2024-04-06 | End: 2024-04-10

## 2024-04-06 RX ORDER — ENOXAPARIN SODIUM 100 MG/ML
40 INJECTION SUBCUTANEOUS DAILY
Status: DISCONTINUED | OUTPATIENT
Start: 2024-04-07 | End: 2024-04-10 | Stop reason: HOSPADM

## 2024-04-06 RX ORDER — LANOLIN ALCOHOL/MO/W.PET/CERES
6 CREAM (GRAM) TOPICAL
Status: DISCONTINUED | OUTPATIENT
Start: 2024-04-06 | End: 2024-04-08

## 2024-04-06 RX ORDER — OLANZAPINE 5 MG/1
5 TABLET, ORALLY DISINTEGRATING ORAL ONCE
Status: COMPLETED | OUTPATIENT
Start: 2024-04-06 | End: 2024-04-06

## 2024-04-06 RX ADMIN — PRAVASTATIN SODIUM 40 MG: 40 TABLET ORAL at 20:01

## 2024-04-06 RX ADMIN — KETOROLAC TROMETHAMINE 15 MG: 30 INJECTION, SOLUTION INTRAMUSCULAR at 18:02

## 2024-04-06 RX ADMIN — OLANZAPINE 5 MG: 5 TABLET, ORALLY DISINTEGRATING ORAL at 20:47

## 2024-04-06 RX ADMIN — TAMSULOSIN HYDROCHLORIDE 0.4 MG: 0.4 CAPSULE ORAL at 20:01

## 2024-04-06 RX ADMIN — CEFTRIAXONE 1000 MG: 1 INJECTION, SOLUTION INTRAVENOUS at 18:02

## 2024-04-06 RX ADMIN — SODIUM CHLORIDE, SODIUM LACTATE, POTASSIUM CHLORIDE, AND CALCIUM CHLORIDE 75 ML/HR: .6; .31; .03; .02 INJECTION, SOLUTION INTRAVENOUS at 20:00

## 2024-04-06 RX ADMIN — SODIUM CHLORIDE 1000 ML: 0.9 INJECTION, SOLUTION INTRAVENOUS at 16:56

## 2024-04-06 RX ADMIN — METRONIDAZOLE 500 MG: 500 INJECTION, SOLUTION INTRAVENOUS at 20:00

## 2024-04-06 RX ADMIN — Medication 6 MG: at 21:22

## 2024-04-06 NOTE — PROGRESS NOTES
"[facility] SLP DISCHARGE NOTE     04/05/24 1230   Pain Assessment   Pain Assessment Tool 0-10   Pain Score No Pain   Restrictions/Precautions   Precautions Aphasia;Aspiration;Bed/chair alarms;Cognitive;Fall Risk;Hard of hearing;Supervision on toilet/commode   Cognitive Linguisitic Assessments   Cognitive Linquistic Quick Test (CLQT) BCAT administered 3/21/24 scored 8/21   Comprehension   Assist Devices Hearing Aid   Comprehension (FIM) 4 - Understands basic info/conversation 75-90% of time   Expression   Expression (FIM) 3 - Expresses basic info/needs 50-74% of time   Social Interaction   Social Interaction (FIM) 5 - Interacts appropriately with others 90% of time   Problem Solving   Problem solving (FIM) 3 - Solves basic problmes 50-74% of time   Memory   Memory (FIM) 3 - Recognizes, recalls/performs 50-74%   Memory Skills   Orientation Level Oriented to person;Oriented to place;Oriented to situation   Motor Speech Evaluation   Intelligibility Intelligibility reduced   Intelligibility Rating 75%-89%  (phrase level 1-3 words)   Speech/Language/Cognition Assessmetn   Treatment Assessment Pt discharged home with his wife and 24/7 supervision recommendations. He will be transitioning to homecare services with continued speech therapy services recommended. Family training has been completed. He would likely benefit from 1 last video ( modified) barium swallow study ~ 4-6 weeks following his last to determine final baseline diet. He continues with inconsistent s.s of aspiration which is variable meal to meal and day to day due to fatigue level. Strict aspiration precautions continue and family has been educated at length regarding his high aspiration risk.          VBS completed 3/11/24  \"Recommendations:  Diet: Dysphagia 1 pureed   Liquids: Nectar   Strategies: slow rate, small sips, alternate liquids with solids, double swallow and effortful swallow.   Upright position  F/u ST tx: yes  Full/Supervision  Aspiration " "Precautions  Reflux Precautions  Consider consult with: Nutrition   Results reviewed with: pt, OP SLP  Aspiration precautions posted.  If a dedicated assessment of the esophagus is desired, consider esophagram/barium swallow or EGD.\"    VBS completed 2/14/24  \"Recommendations:  Diet: Dysphagia 1 Pureed   Liquids: Thin   Meds: Crushed w/ puree  Strategies: SLOW rate, SMALL SIPS, alternate liquids with solids, swallow prior to additonal po, double swallow, effortful swallow. Frequent oral care  Upright position  F/u ST tx: yes  Therapy Prognosis: guarded  Prognosis considerations:age, medical status, cognitive status  Full Supervision  Aspiration Precautions  Reflux Precautions  Consider consult with: Dietary  Results reviewed with: pt, nursing, family, physician, dietician  Aspiration precautions posted.\"   Recommendations   Diet Solid Recommendation Level 1 Dysphagia/pureed   Diet Liquid Recommendation Nectar thick liquid  (10CC provalve cup or single straw sips provided 1:1 assist. Water only outside of meals via 5CC provalve cup or single straw sips provided 1:1 assist.)   Recommended Form of Meds Crushed;With puree   General Precautions Aspiration precautions;Minimize distractions;Upright as possible for all oral intake;Remain upright for 45 mins after meals;Supervision with meals   Compensatory Swallowing Strategies Alternate solids and liquids;Swallow 2 times per bite/sip;External pacing  (meals should remain under 1 hr)   Results Reviewed with PT/Family/Caregiver   Eating   Type of Assistance Needed Supervision   Physical Assistance Level No physical assistance   Eating CARE Score 4   SLP Therapy Minutes   SLP Time In 1230   SLP Time Out 1300   SLP Total Time (minutes) 30   SLP Mode of treatment - Individual (minutes) 30   SLP Mode of treatment - Concurrent (minutes) 0   SLP Mode of treatment - Group (minutes) 0   SLP Mode of treatment - Co-treat (minutes) 0   SLP Mode of Treatment - Total time(minutes) 30 " minutes   SLP Cumulative Minutes 88

## 2024-04-06 NOTE — H&P
[facility]  H&P  Name: Thomas Chase 83 y.o. male I MRN: [id number]  Unit/Bed#: ED 21 I Date of Admission: 4/6/2024   Date of Service: 4/6/2024 I Hospital Day: 0      Assessment/Plan   * Aspiration pneumonia of right lower lobe (HCC)  Assessment & Plan  Patient is presenting with fevers and fatigue.  Chest x-ray revealed questionable right lower lobe pneumonia with official read pending.  Started on ceftriaxone and Flagyl.  Continue ceftriaxone and Flagyl  Chest x-ray official read pending  SLP eval ordered- he's on pureed and nectar thick liquids  Trend procalcitonin tomorrow  Trend WBC    Abnormal urinalysis  Assessment & Plan  Patient presents with fever and abnormal urinalysis.  He only has 4-10 white blood cells which is less likely UTI but we will trend with culture.  He does have a history of prior catheterization. already treated with antibiotics as above    Severe protein-calorie malnutrition (HCC)  Assessment & Plan  Malnutrition Findings:                                 BMI Findings:   Less than 20        There is no height or weight on file to calculate BMI.       Dysphagia  Assessment & Plan  Continue dysphagia diet with nectar liquids. SLP eval    Sensorineural hearing loss (SNHL), bilateral  Assessment & Plan  Very hard of hearing.  Utilizes lipreading.  Continue supportive care    Constipation due to neurogenic bowel  Assessment & Plan  Senna and miralax    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HCC)  Assessment & Plan  Continue supportive care.  Continue zoloft    Hypercholesterolemia  Assessment & Plan  Continue statin             VTE Pharmacologic Prophylaxis: VTE Score: 5 Moderate Risk (Score 3-4) - Pharmacological DVT Prophylaxis Ordered: enoxaparin (Lovenox).  Code Status: Prior   Discussion with family: Updated  (wife and daughter) at bedside.    Anticipated Length of Stay: Patient will be admitted on an inpatient basis with an anticipated length of stay of  greater than 2 midnights secondary to pneumonia and UTI.      Chief Complaint: fever, fatigue    History of Present Illness:  Thomas Chase is a 83 y.o. male with a PMH of Vascular dementia, BPH who presents with fever, fatigue and alteration in mental status.  He was just discharged from acute rehab after 6 weeks there..  He has a worsening cough with hoarseness.  The cough is worse with drinking/eating. He utilizes a dysphagia diet at home.  He had a low appetite.  He uses miralax and senna.  He just came home from acute rehab yesterday and his family noted that he had some chills with a temperature of 100.  He was really only in and out of bed which is unlike him he would normally be more active through the house.    He also received several Finley catheters while in acute rehab.  He denies dysuria or abdominal pain    He is very disappointed to be readmitted.    Review of Systems:  Review of Systems   Constitutional:  Positive for activity change, appetite change, chills, fatigue and fever. Negative for diaphoresis.   HENT:  Negative for congestion, sore throat and trouble swallowing.    Eyes:  Negative for discharge and itching.   Respiratory:  Positive for cough. Negative for chest tightness and shortness of breath.    Gastrointestinal:  Positive for constipation. Negative for abdominal distention, abdominal pain, diarrhea, nausea and vomiting.   Endocrine: Negative for cold intolerance and heat intolerance.   Genitourinary:  Negative for decreased urine volume, difficulty urinating, hematuria and urgency.   Musculoskeletal:  Negative for myalgias and neck stiffness.   Skin:  Negative for rash.   Neurological:  Positive for weakness. Negative for dizziness.       Past Medical and Surgical History:   Past Medical History:   Diagnosis Date    Arthritis     Encounter for general adult medical examination without abnormal findings 03/20/2019    Fall 11/03/2022    Hyperlipidemia     Intracranial hemorrhage (HCC)  02/16/2024    Macular degeneration, wet (HCC)     Urinary retention 06/02/2022       Past Surgical History:   Procedure Laterality Date    BRAIN HEMATOMA EVACUATION Left 05/28/2022    Procedure: left CRANIOTOMY FOR SUBDURAL HEMATOMA;  Surgeon: Chris Watts MD;  Location: [facility];  Service: Neurosurgery    CARPAL TUNNEL RELEASE      HERNIA REPAIR Right     inguinal    IR CEREBRAL ANGIOGRAPHY / INTERVENTION  06/02/2022    NE COCHLEAR DEVICE IMPLANTATION W/WO MASTOIDECTOMY Left 1/17/2023    Procedure: LEFT COCHLEAR IMPLANTATION WITH MASTOIDECTOMY AND FACIAL NERVE MONITORING WITH AUDIOMETRIC TESTING OF THE IMPLANT;  Surgeon: Susie Tuttle MD;  Location: [facility];  Service: ENT    NE NEUROPLASTY &/TRANSPOS MEDIAN NRV CARPAL TUNNE Right 09/20/2021    Procedure: RELEASE CARPAL TUNNEL;  Surgeon: Bruno Sgeovia MD;  Location: [facility];  Service: Orthopedics       Meds/Allergies:  Prior to Admission medications    Medication Sig Start Date End Date Taking? Authorizing Provider   acetaminophen (TYLENOL) 325 mg tablet Take 2 tablets (650 mg total) by mouth every 6 (six) hours as needed for mild pain 4/5/24   Alexandrea Lugo MD   cyanocobalamin (VITAMIN B-12) 500 MCG tablet Take 1 tablet (500 mcg total) by mouth daily 4/5/24   Amada L Dagnall, PA-C   loratadine (CLARITIN) 10 mg tablet Take 1 tablet (10 mg total) by mouth daily 4/5/24   Amada L Dagnall, PA-C   melatonin 3 mg Take 3 tablets (9 mg total) by mouth daily at bedtime 4/5/24   Amada L Dagnall, PA-C   midodrine (PROAMATINE) 5 mg tablet Take 1 tablet (5 mg total) by mouth 3 (three) times a day before meals 4/5/24   Amada L Dagnall, PA-C   Multiple Vitamin (multivitamin) tablet Take 1 tablet by mouth daily 4/5/24   Amada L Dagnall, PA-C   polyethylene glycol (MIRALAX) 17 g packet Take 17 g by mouth daily as needed (constipation) 4/5/24   Alexandrea Lugo MD   senna (SENOKOT) 8.6 mg Take 1 tablet (8.6 mg total) by mouth daily at  bedtime 4/5/24   Alexandrea Lugo MD   sertraline (ZOLOFT) 25 mg tablet Take 1 tablet (25 mg total) by mouth daily 4/5/24   Amada Epperson PA-C   simvastatin (ZOCOR) 20 mg tablet Take 1 tablet (20 mg total) by mouth daily at bedtime 4/5/24   Amada Epperson PA-C   tamsulosin (FLOMAX) 0.4 mg Take 1 capsule (0.4 mg total) by mouth daily with dinner 4/5/24   Amada Epperson PA-C     I have reviewed home medications with patient family member.    Allergies: No Known Allergies    Social History:  Marital Status: /Civil Union   Occupation: Retired  Patient Pre-hospital Living Situation: Home  Patient Pre-hospital Level of Mobility: walks with walker  Patient Pre-hospital Diet Restrictions: Dysphagia diet with nectar thick liquids  Substance Use History:   Social History     Substance and Sexual Activity   Alcohol Use Not Currently    Alcohol/week: 3.0 standard drinks of alcohol    Types: 3 Cans of beer per week     Social History     Tobacco Use   Smoking Status Former   Smokeless Tobacco Never   Tobacco Comments    Smoked as a teenager     Social History     Substance and Sexual Activity   Drug Use No       Family History:  Family History   Problem Relation Age of Onset    Cancer Mother     Hyperlipidemia Father     Coronary artery disease Father        Physical Exam:     Vitals:   Blood Pressure: 128/74 (04/06/24 1624)  Pulse: 101 (04/06/24 1624)  Temperature: 99.6 °F (37.6 °C) (04/06/24 1624)  Temp Source: Temporal (04/06/24 1624)  Respirations: 18 (04/06/24 1624)  SpO2: 92 % (04/06/24 1624)    Physical Exam  Vitals and nursing note reviewed.   Constitutional:       General: He is in acute distress.      Appearance: Normal appearance. He is ill-appearing.   HENT:      Head: Normocephalic.      Nose: Nose normal. No congestion.      Mouth/Throat:      Mouth: Mucous membranes are moist.      Pharynx: No oropharyngeal exudate.   Eyes:      General: No scleral icterus.     Conjunctiva/sclera:  Conjunctivae normal.   Cardiovascular:      Rate and Rhythm: Normal rate and regular rhythm.      Heart sounds: No murmur heard.  Pulmonary:      Effort: Pulmonary effort is normal. No respiratory distress.      Breath sounds: No wheezing or rales.   Abdominal:      General: Bowel sounds are normal. There is no distension.      Palpations: Abdomen is soft.      Tenderness: There is no abdominal tenderness.   Genitourinary:     Comments: No Finley  Musculoskeletal:         General: No swelling or tenderness. Normal range of motion.      Cervical back: Normal range of motion and neck supple. No rigidity.      Right lower leg: No edema.      Left lower leg: No edema.   Neurological:      Mental Status: He is alert.      Comments: Unable to obtain due to challenges with communication   Psychiatric:         Behavior: Behavior normal.          Additional Data:     Lab Results:  Results from last 7 days   Lab Units 04/06/24  1636   WBC Thousand/uL 11.04*   HEMOGLOBIN g/dL 13.1   HEMATOCRIT % 41.6   PLATELETS Thousands/uL 437*   NEUTROS PCT % 84*   LYMPHS PCT % 7*   MONOS PCT % 8   EOS PCT % 0     Results from last 7 days   Lab Units 04/06/24  1636   SODIUM mmol/L 138   POTASSIUM mmol/L 4.6   CHLORIDE mmol/L 101   CO2 mmol/L 29   BUN mg/dL 21   CREATININE mg/dL 0.69   ANION GAP mmol/L 8   CALCIUM mg/dL 9.1   ALBUMIN g/dL 3.7   TOTAL BILIRUBIN mg/dL 0.50   ALK PHOS U/L 101   ALT U/L 36   AST U/L 25   GLUCOSE RANDOM mg/dL 99     Results from last 7 days   Lab Units 04/06/24  1636   INR  0.96             Results from last 7 days   Lab Units 04/06/24  1636   LACTIC ACID mmol/L 1.5   PROCALCITONIN ng/ml 0.26*       Lines/Drains:  Invasive Devices       Peripheral Intravenous Line  Duration             Peripheral IV 04/06/24 Left Forearm <1 day                        Imaging: Personally reviewed the following imaging: chest xray  XR chest 1 view portable    (Results Pending)       EKG and Other Studies Reviewed on Admission:    EKG: Sinus Tachycardia.  right bundle branch block noted.    ** Please Note: This note has been constructed using a voice recognition system. **

## 2024-04-06 NOTE — ED PROVIDER NOTES
History  Chief Complaint   Patient presents with    Lethargy     Patient comes in with weakness, fever, shaking for the past day. Patient at [facility] for 6 weeks and discharged yesterday.      Patient is an 83-year-old male with recent admission to [facility] for the past 6 weeks who presents for evaluation of fever.  He was discharged yesterday.  Family is primarily provided by daughter who is at the bedside.  Patient apparently has had low-grade temps Tmax 99.9.  He has some shaking as well today.  Also significant coughing, history of aspiration in the past.  He is also been urinating more frequently than normal.  Otherwise denies pain.  He cannot hear and has difficulty reading so communication/history is limited.        Prior to Admission Medications   Prescriptions Last Dose Informant Patient Reported? Taking?   Multiple Vitamin (multivitamin) tablet   No No   Sig: Take 1 tablet by mouth daily   acetaminophen (TYLENOL) 325 mg tablet   No No   Sig: Take 2 tablets (650 mg total) by mouth every 6 (six) hours as needed for mild pain   cyanocobalamin (VITAMIN B-12) 500 MCG tablet   No No   Sig: Take 1 tablet (500 mcg total) by mouth daily   loratadine (CLARITIN) 10 mg tablet   No No   Sig: Take 1 tablet (10 mg total) by mouth daily   melatonin 3 mg   No No   Sig: Take 3 tablets (9 mg total) by mouth daily at bedtime   midodrine (PROAMATINE) 5 mg tablet   No No   Sig: Take 1 tablet (5 mg total) by mouth 3 (three) times a day before meals   polyethylene glycol (MIRALAX) 17 g packet   No No   Sig: Take 17 g by mouth daily as needed (constipation)   senna (SENOKOT) 8.6 mg   No No   Sig: Take 1 tablet (8.6 mg total) by mouth daily at bedtime   sertraline (ZOLOFT) 25 mg tablet   No No   Sig: Take 1 tablet (25 mg total) by mouth daily   simvastatin (ZOCOR) 20 mg tablet   No No   Sig: Take 1 tablet (20 mg total) by mouth daily at bedtime   tamsulosin (FLOMAX) 0.4 mg   No No   Sig: Take 1 capsule (0.4 mg total) by mouth daily with  dinner      Facility-Administered Medications: None       Past Medical History:   Diagnosis Date    Arthritis     Encounter for general adult medical examination without abnormal findings 03/20/2019    Fall 11/03/2022    Hyperlipidemia     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    Macular degeneration, wet (HCC)     Urinary retention 06/02/2022       Past Surgical History:   Procedure Laterality Date    BRAIN HEMATOMA EVACUATION Left 05/28/2022    Procedure: left CRANIOTOMY FOR SUBDURAL HEMATOMA;  Surgeon: Chris Watts MD;  Location:  MAIN OR;  Service: Neurosurgery    CARPAL TUNNEL RELEASE      HERNIA REPAIR Right     inguinal    IR CEREBRAL ANGIOGRAPHY / INTERVENTION  06/02/2022    ND COCHLEAR DEVICE IMPLANTATION W/WO MASTOIDECTOMY Left 1/17/2023    Procedure: LEFT COCHLEAR IMPLANTATION WITH MASTOIDECTOMY AND FACIAL NERVE MONITORING WITH AUDIOMETRIC TESTING OF THE IMPLANT;  Surgeon: Susie Tuttle MD;  Location:  MAIN OR;  Service: ENT    ND NEUROPLASTY &/TRANSPOS MEDIAN NRV CARPAL TUNNE Right 09/20/2021    Procedure: RELEASE CARPAL TUNNEL;  Surgeon: Bruno Segovia MD;  Location: [facility];  Service: Orthopedics       Family History   Problem Relation Age of Onset    Cancer Mother     Hyperlipidemia Father     Coronary artery disease Father      I have reviewed and agree with the history as documented.    E-Cigarette/Vaping    E-Cigarette Use Never User      E-Cigarette/Vaping Substances     Social History     Tobacco Use    Smoking status: Former    Smokeless tobacco: Never    Tobacco comments:     Smoked as a teenager   Vaping Use    Vaping status: Never Used   Substance Use Topics    Alcohol use: Not Currently     Alcohol/week: 3.0 standard drinks of alcohol     Types: 3 Cans of beer per week    Drug use: No       Review of Systems   Unable to perform ROS: Patient nonverbal       Physical Exam  Physical Exam  Vitals reviewed.   Constitutional:       General: He is not in acute distress.      Appearance: He is well-developed.   HENT:      Head: Normocephalic.   Eyes:      Pupils: Pupils are equal, round, and reactive to light.   Cardiovascular:      Rate and Rhythm: Normal rate and regular rhythm.      Heart sounds: Normal heart sounds. No murmur heard.     No friction rub. No gallop.   Pulmonary:      Effort: Pulmonary effort is normal.      Breath sounds: Normal breath sounds.   Abdominal:      General: Bowel sounds are normal. There is no distension.      Palpations: Abdomen is soft.      Tenderness: There is no abdominal tenderness. There is no guarding.   Musculoskeletal:         General: Normal range of motion.      Cervical back: Normal range of motion and neck supple.   Skin:     Capillary Refill: Capillary refill takes less than 2 seconds.   Neurological:      Mental Status: He is alert.      Cranial Nerves: No cranial nerve deficit.      Sensory: No sensory deficit.      Motor: No abnormal muscle tone.   Psychiatric:         Behavior: Behavior normal.         Thought Content: Thought content normal.         Judgment: Judgment normal.         Vital Signs  ED Triage Vitals   Temperature Pulse Respirations Blood Pressure SpO2   04/06/24 1624 04/06/24 1624 04/06/24 1624 04/06/24 1624 04/06/24 1624   99.6 °F (37.6 °C) 101 18 128/74 92 %      Temp Source Heart Rate Source Patient Position - Orthostatic VS BP Location FiO2 (%)   04/06/24 1624 -- -- 04/06/24 1624 --   Temporal   Left arm       Pain Score       04/06/24 1802       Med Not Given for Pain - for MAR use only           Vitals:    04/06/24 1624 04/06/24 1903 04/06/24 1917   BP: 128/74 110/71    Pulse: 101 93 79         Visual Acuity      ED Medications  Medications   metroNIDAZOLE (FLAGYL) IVPB (premix) 500 mg 100 mL (500 mg Intravenous New Bag 4/6/24 2000)   cyanocobalamin (VITAMIN B-12) tablet 500 mcg (has no administration in time range)   midodrine (PROAMATINE) tablet 5 mg (has no administration in time range)   polyethylene glycol  (MIRALAX) packet 17 g (has no administration in time range)   sertraline (ZOLOFT) tablet 25 mg (has no administration in time range)   pravastatin (PRAVACHOL) tablet 40 mg (40 mg Oral Given 4/6/24 2001)   tamsulosin (FLOMAX) capsule 0.4 mg (0.4 mg Oral Given 4/6/24 2001)   acetaminophen (TYLENOL) tablet 650 mg (has no administration in time range)   enoxaparin (LOVENOX) subcutaneous injection 40 mg (has no administration in time range)   cefTRIAXone (ROCEPHIN) IVPB (premix in dextrose) 1,000 mg 50 mL (has no administration in time range)   melatonin tablet 6 mg (has no administration in time range)   lactated ringers infusion (75 mL/hr Intravenous New Bag 4/6/24 2000)   ketorolac (TORADOL) injection 15 mg (15 mg Intravenous Given 4/6/24 1802)   sodium chloride 0.9 % bolus 1,000 mL (0 mL Intravenous Stopped 4/6/24 2049)   cefTRIAXone (ROCEPHIN) IVPB (premix in dextrose) 1,000 mg 50 mL (0 mg Intravenous Stopped 4/6/24 2049)   OLANZapine (ZyPREXA ZYDIS) dispersible tablet 5 mg (5 mg Oral Given 4/6/24 2047)       Diagnostic Studies  Results Reviewed       Procedure Component Value Units Date/Time    Blood culture #1 [[id number]] Collected: 04/06/24 2035    Lab Status: In process Specimen: Blood from Arm, Right Updated: 04/06/24 2041    Strep Pneumoniae, Urine [[id number]]     Lab Status: No result Specimen: Urine     Legionella antigen, Urine [[id number]]     Lab Status: No result Specimen: Urine     TSH, 3rd generation with Free T4 reflex [[id number]]  (Abnormal) Collected: 04/06/24 1636    Lab Status: Final result Specimen: Blood from Arm, Left Updated: 04/06/24 1750     TSH 3RD GENERATON 4.962 uIU/mL     T4, free [[id number]] Collected: 04/06/24 1636    Lab Status: In process Specimen: Blood from Arm, Left Updated: 04/06/24 1750    Procalcitonin [[id number]]  (Abnormal) Collected: 04/06/24 1636    Lab Status: Final result Specimen: Blood from Arm, Left Updated: 04/06/24 1745     Procalcitonin 0.26 ng/ml     FLU/RSV/COVID -  if FLU/RSV clinically relevant [[id number]]  (Normal) Collected: 04/06/24 1636    Lab Status: Final result Specimen: Nares from Nose Updated: 04/06/24 1743     SARS-CoV-2 Negative     INFLUENZA A PCR Negative     INFLUENZA B PCR Negative     RSV PCR Negative    Narrative:      FOR PEDIATRIC PATIENTS - copy/paste COVID Guidelines URL to browser: [email or web address]    SARS-CoV-2 assay is a Nucleic Acid Amplification assay intended for the  qualitative detection of nucleic acid from SARS-CoV-2 in nasopharyngeal  swabs. Results are for the presumptive identification of SARS-CoV-2 RNA.    Positive results are indicative of infection with SARS-CoV-2, the virus  causing COVID-19, but do not rule out bacterial infection or co-infection  with other viruses. Laboratories within the [place] and its  territories are required to report all positive results to the appropriate  public health authorities. Negative results do not preclude SARS-CoV-2  infection and should not be used as the sole basis for treatment or other  patient management decisions. Negative results must be combined with  clinical observations, patient history, and epidemiological information.  This test has not been FDA cleared or approved.    This test has been authorized by FDA under an Emergency Use Authorization  (EUA). This test is only authorized for the duration of time the  declaration that circumstances exist justifying the authorization of the  emergency use of an in vitro diagnostic tests for detection of SARS-CoV-2  virus and/or diagnosis of COVID-19 infection under section 564(b)(1) of  the Act, 21 U.S.C. 360bbb-3(b)(1), unless the authorization is terminated  or revoked sooner. The test has been validated but independent review by FDA  and CLIA is pending.    Test performed using [employer]: This RT-PCR assay targets N2,  a region unique to SARS-CoV-2. A conserved region in the  E-gene was chosen  for pan-Sarbecovirus detection which includes SARS-CoV-2.    According to CMS-2020-01-R, this platform meets the definition of high-throughput technology.    Urine Microscopic [[id number]]  (Abnormal) Collected: 04/06/24 1636    Lab Status: Final result Specimen: Urine, Clean Catch Updated: 04/06/24 1730     RBC, UA 2-4 /hpf      WBC, UA 4-10 /hpf      Epithelial Cells None Seen /hpf      Bacteria, UA Moderate /hpf      AMORPH PHOSPATES Moderate /hpf      MUCUS THREADS Occasional    Comprehensive metabolic panel [[id number]] Collected: 04/06/24 1636    Lab Status: Final result Specimen: Blood from Arm, Left Updated: 04/06/24 1704     Sodium 138 mmol/L      Potassium 4.6 mmol/L      Chloride 101 mmol/L      CO2 29 mmol/L      ANION GAP 8 mmol/L      BUN 21 mg/dL      Creatinine 0.69 mg/dL      Glucose 99 mg/dL      Calcium 9.1 mg/dL      AST 25 U/L      ALT 36 U/L      Alkaline Phosphatase 101 U/L      Total Protein 7.5 g/dL      Albumin 3.7 g/dL      Total Bilirubin 0.50 mg/dL      eGFR 87 ml/min/1.73sq m     Narrative:      National Kidney Disease Foundation guidelines for Chronic Kidney Disease (CKD):     Stage 1 with normal or high GFR (GFR > 90 mL/min/1.73 square meters)    Stage 2 Mild CKD (GFR = 60-89 mL/min/1.73 square meters)    Stage 3A Moderate CKD (GFR = 45-59 mL/min/1.73 square meters)    Stage 3B Moderate CKD (GFR = 30-44 mL/min/1.73 square meters)    Stage 4 Severe CKD (GFR = 15-29 mL/min/1.73 square meters)    Stage 5 End Stage CKD (GFR <15 mL/min/1.73 square meters)  Note: GFR calculation is accurate only with a steady state creatinine    Lactic acid [[id number]]  (Normal) Collected: 04/06/24 1636    Lab Status: Final result Specimen: Blood from Arm, Left Updated: 04/06/24 1703     LACTIC ACID 1.5 mmol/L     Narrative:      Result may be elevated if tourniquet was used during collection.    UA w Reflex to Microscopic w Reflex to Culture [[id number]]  (Abnormal) Collected: 04/06/24 1636     Lab Status: Final result Specimen: Urine, Clean Catch Updated: 04/06/24 1703     Color, UA Yellow     Clarity, UA Cloudy     Specific Gravity, UA 1.020     pH, UA 7.5     Leukocytes, UA Negative     Nitrite, UA Positive     Protein, UA Negative mg/dl      Glucose, UA Negative mg/dl      Ketones, UA Negative mg/dl      Urobilinogen, UA 4.0 E.U./dl      Bilirubin, UA Negative     Occult Blood, UA Negative    Protime-INR [[id number]]  (Normal) Collected: 04/06/24 1636    Lab Status: Final result Specimen: Blood from Arm, Left Updated: 04/06/24 1702     Protime 13.0 seconds      INR 0.96    APTT [[id number]]  (Normal) Collected: 04/06/24 1636    Lab Status: Final result Specimen: Blood from Arm, Left Updated: 04/06/24 1702     PTT 28 seconds     CBC and differential [[id number]]  (Abnormal) Collected: 04/06/24 1636    Lab Status: Final result Specimen: Blood from Arm, Left Updated: 04/06/24 1647     WBC 11.04 Thousand/uL      RBC 4.21 Million/uL      Hemoglobin 13.1 g/dL      Hematocrit 41.6 %      MCV 99 fL      MCH 31.1 pg      MCHC 31.5 g/dL      RDW 14.1 %      MPV 9.3 fL      Platelets 437 Thousands/uL      nRBC 0 /100 WBCs      Neutrophils Relative 84 %      Immature Grans % 1 %      Lymphocytes Relative 7 %      Monocytes Relative 8 %      Eosinophils Relative 0 %      Basophils Relative 0 %      Neutrophils Absolute 9.29 Thousands/µL      Absolute Immature Grans 0.05 Thousand/uL      Absolute Lymphocytes 0.79 Thousands/µL      Absolute Monocytes 0.85 Thousand/µL      Eosinophils Absolute 0.03 Thousand/µL      Basophils Absolute 0.03 Thousands/µL     Blood culture #2 [[id number]] Collected: 04/06/24 1636    Lab Status: In process Specimen: Blood from Arm, Left Updated: 04/06/24 1642                   XR chest 1 view portable    (Results Pending)              Procedures  ECG 12 Lead Documentation Only    Date/Time: 4/6/2024 8:50 PM    Performed by: Kenrick Lui MD  Authorized by: Kenrick Lui MD    ECG  reviewed by me, the ED Provider: yes    Patient location:  ED  Previous ECG:     Previous ECG:  Unavailable    Comparison to cardiac monitor: Yes    Interpretation:     Interpretation: normal    Rate:     ECG rate assessment: tachycardic    Rhythm:     Rhythm: sinus tachycardia    Ectopy:     Ectopy: none    QRS:     QRS axis:  Normal    QRS intervals:  Normal  Conduction:     Conduction: abnormal      Abnormal conduction: complete RBBB    ST segments:     ST segments:  Normal  T waves:     T waves: normal             ED Course                               SBIRT 20yo+      Flowsheet Row Most Recent Value   Initial Alcohol Screen: US AUDIT-C     1. How often do you have a drink containing alcohol? 0 Filed at: 04/06/2024 1626   2. How many drinks containing alcohol do you have on a typical day you are drinking?  0 Filed at: 04/06/2024 1626   3a. Male UNDER 65: How often do you have five or more drinks on one occasion? 0 Filed at: 04/06/2024 1626   3b. FEMALE Any Age, or MALE 65+: How often do you have 4 or more drinks on one occassion? 0 Filed at: 04/06/2024 1626   Audit-C Score 0 Filed at: 04/06/2024 1626   KULWANT: How many times in the past year have you...    Used an illegal drug or used a prescription medication for non-medical reasons? Never Filed at: 04/06/2024 1626                      Medical Decision Making  Patient is an 83-year-old male who presents for evaluation of fever and generalized weakness.  Primary concern for UTI and pneumonia.  Chest x-ray reviewed and showed possible right lower lobe infiltrate.  UTI also seen on urinalysis.  Leukocytosis noted, started with IV antibiotics will be admitted to hospital.  Previous records reviewed.    Amount and/or Complexity of Data Reviewed  Labs: ordered.  Radiology: ordered.    Risk  Prescription drug management.  Decision regarding hospitalization.             Disposition  Final diagnoses:   UTI (urinary tract infection)   Generalized weakness   Aspiration  pneumonia (HCC)     Time reflects when diagnosis was documented in both MDM as applicable and the Disposition within this note       Time User Action Codes Description Comment    4/6/2024  6:24 PM Kenrick Lui Add [N39.0] UTI (urinary tract infection)     4/6/2024  6:24 PM Kenrick Lui Add [R53.1] Generalized weakness     4/6/2024  6:24 PM Kenrick Lui Add [J69.0] Aspiration pneumonia (HCC)           ED Disposition       ED Disposition   Admit    Condition   Stable    Date/Time   Sat Apr 6, 2024 1824    Comment   Case was discussed with JOSE A and the patient's admission status was agreed to be Admission Status: inpatient status to the service of Dr. Fierro .               Follow-up Information    None         Current Discharge Medication List        CONTINUE these medications which have NOT CHANGED    Details   acetaminophen (TYLENOL) 325 mg tablet Take 2 tablets (650 mg total) by mouth every 6 (six) hours as needed for mild pain    Associated Diagnoses: Primary osteoarthritis of left knee      cyanocobalamin (VITAMIN B-12) 500 MCG tablet Take 1 tablet (500 mcg total) by mouth daily  Qty: 30 tablet, Refills: 0    Associated Diagnoses: Vitamin B12 deficiency      loratadine (CLARITIN) 10 mg tablet Take 1 tablet (10 mg total) by mouth daily  Qty: 30 tablet, Refills: 0    Associated Diagnoses: Allergies      melatonin 3 mg Take 3 tablets (9 mg total) by mouth daily at bedtime  Qty: 90 tablet, Refills: 0    Associated Diagnoses: Insomnia      midodrine (PROAMATINE) 5 mg tablet Take 1 tablet (5 mg total) by mouth 3 (three) times a day before meals  Qty: 90 tablet, Refills: 0    Associated Diagnoses: Orthostatic hypotension      Multiple Vitamin (multivitamin) tablet Take 1 tablet by mouth daily  Qty: 30 tablet, Refills: 0    Associated Diagnoses: Debility      polyethylene glycol (MIRALAX) 17 g packet Take 17 g by mouth daily as needed (constipation)  Qty: 30 each, Refills: 0    Associated Diagnoses:  Constipation      senna (SENOKOT) 8.6 mg Take 1 tablet (8.6 mg total) by mouth daily at bedtime  Qty: 30 tablet, Refills: 0    Associated Diagnoses: Constipation      sertraline (ZOLOFT) 25 mg tablet Take 1 tablet (25 mg total) by mouth daily  Qty: 30 tablet, Refills: 0    Associated Diagnoses: Depression with anxiety      simvastatin (ZOCOR) 20 mg tablet Take 1 tablet (20 mg total) by mouth daily at bedtime  Qty: 30 tablet, Refills: 0    Associated Diagnoses: Hypercholesterolemia      tamsulosin (FLOMAX) 0.4 mg Take 1 capsule (0.4 mg total) by mouth daily with dinner  Qty: 30 capsule, Refills: 0    Associated Diagnoses: BPH (benign prostatic hyperplasia)             No discharge procedures on file.    PDMP Review         Value Time User    PDMP Reviewed  Yes 1/17/2023  7:37 AM Susie Tuttle MD            ED Provider  Electronically Signed by             Kenrick Lui MD  04/06/24 2051

## 2024-04-06 NOTE — ASSESSMENT & PLAN NOTE
Malnutrition Findings:                                 BMI Findings:   Less than 20        There is no height or weight on file to calculate BMI.

## 2024-04-06 NOTE — ASSESSMENT & PLAN NOTE
Patient is presenting with fevers and fatigue.  Chest x-ray revealed questionable right lower lobe pneumonia with official read pending.  Started on ceftriaxone and Flagyl.  Continue ceftriaxone and Flagyl  Chest x-ray official read pending  SLP eval ordered- he's on pureed and nectar thick liquids

## 2024-04-06 NOTE — ASSESSMENT & PLAN NOTE
Patient presents with fever and abnormal urinalysis.  He only has 4-10 white blood cells which is less likely UTI but we will trend with culture.  Already treated with antibiotics as above

## 2024-04-07 LAB
ALBUMIN SERPL BCP-MCNC: 3.1 G/DL (ref 3.5–5)
ALP SERPL-CCNC: 78 U/L (ref 34–104)
ALT SERPL W P-5'-P-CCNC: 25 U/L (ref 7–52)
ANION GAP SERPL CALCULATED.3IONS-SCNC: 7 MMOL/L (ref 4–13)
AST SERPL W P-5'-P-CCNC: 17 U/L (ref 13–39)
ATRIAL RATE: 101 BPM
BASOPHILS # BLD AUTO: 0.02 THOUSANDS/ÂΜL (ref 0–0.1)
BASOPHILS NFR BLD AUTO: 0 % (ref 0–1)
BILIRUB SERPL-MCNC: 0.42 MG/DL (ref 0.2–1)
BUN SERPL-MCNC: 19 MG/DL (ref 5–25)
CALCIUM ALBUM COR SERPL-MCNC: 9.1 MG/DL (ref 8.3–10.1)
CALCIUM SERPL-MCNC: 8.4 MG/DL (ref 8.4–10.2)
CHLORIDE SERPL-SCNC: 103 MMOL/L (ref 96–108)
CO2 SERPL-SCNC: 28 MMOL/L (ref 21–32)
CREAT SERPL-MCNC: 0.65 MG/DL (ref 0.6–1.3)
EOSINOPHIL # BLD AUTO: 0.07 THOUSAND/ÂΜL (ref 0–0.61)
EOSINOPHIL NFR BLD AUTO: 1 % (ref 0–6)
ERYTHROCYTE [DISTWIDTH] IN BLOOD BY AUTOMATED COUNT: 14.2 % (ref 11.6–15.1)
GFR SERPL CREATININE-BSD FRML MDRD: 89 ML/MIN/1.73SQ M
GLUCOSE SERPL-MCNC: 86 MG/DL (ref 65–140)
HCT VFR BLD AUTO: 34.6 % (ref 36.5–49.3)
HGB BLD-MCNC: 10.8 G/DL (ref 12–17)
IMM GRANULOCYTES # BLD AUTO: 0.02 THOUSAND/UL (ref 0–0.2)
IMM GRANULOCYTES NFR BLD AUTO: 0 % (ref 0–2)
L PNEUMO1 AG UR QL IA.RAPID: NEGATIVE
LYMPHOCYTES # BLD AUTO: 1.28 THOUSANDS/ÂΜL (ref 0.6–4.47)
LYMPHOCYTES NFR BLD AUTO: 17 % (ref 14–44)
MCH RBC QN AUTO: 31.3 PG (ref 26.8–34.3)
MCHC RBC AUTO-ENTMCNC: 31.2 G/DL (ref 31.4–37.4)
MCV RBC AUTO: 100 FL (ref 82–98)
MONOCYTES # BLD AUTO: 0.54 THOUSAND/ÂΜL (ref 0.17–1.22)
MONOCYTES NFR BLD AUTO: 7 % (ref 4–12)
NEUTROPHILS # BLD AUTO: 5.73 THOUSANDS/ÂΜL (ref 1.85–7.62)
NEUTS SEG NFR BLD AUTO: 75 % (ref 43–75)
NRBC BLD AUTO-RTO: 0 /100 WBCS
P AXIS: 79 DEGREES
PLATELET # BLD AUTO: 330 THOUSANDS/UL (ref 149–390)
PMV BLD AUTO: 9.7 FL (ref 8.9–12.7)
POTASSIUM SERPL-SCNC: 3.9 MMOL/L (ref 3.5–5.3)
PR INTERVAL: 156 MS
PROCALCITONIN SERPL-MCNC: 0.64 NG/ML
PROT SERPL-MCNC: 6.1 G/DL (ref 6.4–8.4)
QRS AXIS: 46 DEGREES
QRSD INTERVAL: 116 MS
QT INTERVAL: 370 MS
QTC INTERVAL: 479 MS
RBC # BLD AUTO: 3.45 MILLION/UL (ref 3.88–5.62)
S PNEUM AG UR QL: NEGATIVE
SODIUM SERPL-SCNC: 138 MMOL/L (ref 135–147)
T WAVE AXIS: 57 DEGREES
T4 FREE SERPL-MCNC: 0.79 NG/DL (ref 0.61–1.12)
VENTRICULAR RATE: 101 BPM
WBC # BLD AUTO: 7.66 THOUSAND/UL (ref 4.31–10.16)

## 2024-04-07 PROCEDURE — 87449 NOS EACH ORGANISM AG IA: CPT | Performed by: INTERNAL MEDICINE

## 2024-04-07 PROCEDURE — 85025 COMPLETE CBC W/AUTO DIFF WBC: CPT | Performed by: INTERNAL MEDICINE

## 2024-04-07 PROCEDURE — 80053 COMPREHEN METABOLIC PANEL: CPT | Performed by: INTERNAL MEDICINE

## 2024-04-07 PROCEDURE — 93010 ELECTROCARDIOGRAM REPORT: CPT | Performed by: INTERNAL MEDICINE

## 2024-04-07 PROCEDURE — 84145 PROCALCITONIN (PCT): CPT | Performed by: INTERNAL MEDICINE

## 2024-04-07 PROCEDURE — 99232 SBSQ HOSP IP/OBS MODERATE 35: CPT | Performed by: INTERNAL MEDICINE

## 2024-04-07 RX ORDER — OLANZAPINE 5 MG/1
5 TABLET, ORALLY DISINTEGRATING ORAL
Status: DISCONTINUED | OUTPATIENT
Start: 2024-04-07 | End: 2024-04-08

## 2024-04-07 RX ADMIN — CYANOCOBALAMIN TAB 500 MCG 500 MCG: 500 TAB at 09:11

## 2024-04-07 RX ADMIN — TAMSULOSIN HYDROCHLORIDE 0.4 MG: 0.4 CAPSULE ORAL at 16:10

## 2024-04-07 RX ADMIN — MIDODRINE HYDROCHLORIDE 5 MG: 5 TABLET ORAL at 12:00

## 2024-04-07 RX ADMIN — CEFTRIAXONE 1000 MG: 1 INJECTION, SOLUTION INTRAVENOUS at 18:28

## 2024-04-07 RX ADMIN — ENOXAPARIN SODIUM 40 MG: 40 INJECTION SUBCUTANEOUS at 09:11

## 2024-04-07 RX ADMIN — Medication 6 MG: at 21:47

## 2024-04-07 RX ADMIN — METRONIDAZOLE 500 MG: 500 INJECTION, SOLUTION INTRAVENOUS at 09:11

## 2024-04-07 RX ADMIN — SERTRALINE HYDROCHLORIDE 25 MG: 25 TABLET ORAL at 09:11

## 2024-04-07 RX ADMIN — PRAVASTATIN SODIUM 40 MG: 40 TABLET ORAL at 16:10

## 2024-04-07 RX ADMIN — MIDODRINE HYDROCHLORIDE 5 MG: 5 TABLET ORAL at 06:00

## 2024-04-07 RX ADMIN — METRONIDAZOLE 500 MG: 500 INJECTION, SOLUTION INTRAVENOUS at 19:30

## 2024-04-07 RX ADMIN — MIDODRINE HYDROCHLORIDE 5 MG: 5 TABLET ORAL at 16:10

## 2024-04-07 RX ADMIN — OLANZAPINE 5 MG: 5 TABLET, ORALLY DISINTEGRATING ORAL at 22:55

## 2024-04-07 RX ADMIN — METRONIDAZOLE 500 MG: 500 INJECTION, SOLUTION INTRAVENOUS at 04:16

## 2024-04-07 NOTE — RESPIRATORY THERAPY NOTE
RT Protocol Note  Thomas Chase 83 y.o. male MRN: [id number]  Unit/Bed#: -01 Encounter: [id number]    Assessment    Principal Problem:    Aspiration pneumonia of right lower lobe (HCC)  Active Problems:    Hypercholesterolemia    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HCC)    Constipation due to neurogenic bowel    Sensorineural hearing loss (SNHL), bilateral    Dysphagia    Severe protein-calorie malnutrition (HCC)    Abnormal urinalysis    Benign prostatic hyperplasia without lower urinary tract symptoms      Home Pulmonary Medications:none    Home Devices/Therapy:  (none)    Past Medical History:   Diagnosis Date    Arthritis     Encounter for general adult medical examination without abnormal findings 03/20/2019    Fall 11/03/2022    Hyperlipidemia     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    Macular degeneration, wet (HCC)     Urinary retention 06/02/2022     Social History     Socioeconomic History    Marital status: /Civil Union     Spouse name: None    Number of children: None    Years of education: None    Highest education level: None   Occupational History    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[employer]   Tobacco Use    Smoking status: Former    Smokeless tobacco: Never    Tobacco comments:     Smoked as a teenager   Vaping Use    Vaping status: Never Used   Substance and Sexual Activity    Alcohol use: Not Currently     Alcohol/week: 3.0 standard drinks of alcohol     Types: 3 Cans of beer per week    Drug use: No    Sexual activity: Not Currently   Other Topics Concern    None   Social History Narrative    None     Social Determinants of Health     Financial Resource Strain: Low Risk  (9/1/2022)    Overall Financial Resource Strain (CARDIA)     Difficulty of Paying Living Expenses: Not hard at all   Food Insecurity: No Food Insecurity (4/5/2024)    Hunger Vital Sign     Worried About Running Out of Food in the Last Year: Never true     Ran Out of Food in the Last Year: Never true   Transportation Needs: Patient  Unable To Answer (4/5/2024)    PRAPARE - Transportation     Lack of Transportation (Medical): Patient unable to answer     Lack of Transportation (Non-Medical): Patient unable to answer   Physical Activity: Not on file   Stress: Not on file   Social Connections: Not on file   Intimate Partner Violence: Not on file   Housing Stability: Patient Unable To Answer (4/5/2024)    Housing Stability Vital Sign     Unable to Pay for Housing in the Last Year: Patient unable to answer     Number of Places Lived in the Last Year: 1     Unstable Housing in the Last Year: Patient unable to answer       Subjective         Objective    Physical Exam:   Assessment Type: Assess only  General Appearance: Awake  Respiratory Pattern: Normal  Chest Assessment: Chest expansion symmetrical  Bilateral Breath Sounds: Clear, Diminished  O2 Device: room air    Vitals:  Blood pressure 110/71, pulse 77, temperature 98.9 °F (37.2 °C), resp. rate 16, height 6' (1.829 m), weight 57.5 kg (126 lb 10.5 oz), SpO2 93%.          Imaging and other studies: I have personally reviewed pertinent reports.     04/06/24 2045   Respiratory Protocol   Protocol Initiated? No   Language Barrier? Yes  (pt deaf)   Medical & Social History Reviewed? Yes   Diagnostic Studies Reviewed? Yes   Physical Assessment Performed? Yes   Home Devices/Therapy   (none)   Respiratory Assessment   Assessment Type Assess only   General Appearance Awake   Respiratory Pattern Normal   Chest Assessment Chest expansion symmetrical   Bilateral Breath Sounds Clear;Diminished   Resp Comments pt assessed as per protocol. BBS diminished clear. pt takes no home respiratory therapy. protocol to be dcd   O2 Device room air   Additional Assessments   Pulse 77   Respirations 16         O2 Device: room air     Plan             Resp Comments: pt assessed as per protocol. BBS diminished clear. pt takes no home respiratory therapy. protocol to be dcd

## 2024-04-07 NOTE — QUICK NOTE
Medicine attending update    I updated Mr. Chase's wife at bedside.    I discussed with his bedside RN, plan to provide Ensure with every meal.    HATTIE

## 2024-04-07 NOTE — ASSESSMENT & PLAN NOTE
Physical Therapy Daily Treatment     Visit Count: 2  Plan of Care Dates: Initial: 1/22/2018 Through: 4/16/2018  Insurance Information: physical and speech therapy combined cap of $2010 per calendar year, has used 0 therapy visits prior this year  Next Referring Provider Visit: none scheduled     Referred by: Archie Caceres MD  Medical Diagnosis (from order):  M75.41 Rotator cuff impingement syndrome of right shoulder  (primary encounter diagnosis)   Insurance: 1. MEDICARE  2. WPS    SUBJECTIVE   Little tightness when doing some of the pulling things. I was able to sleep on right side without too much trouble.   Current Pain: did not rate/10.    Functional Change: try not to lie on my right side.     OBJECTIVE   Range of Motion (degrees)    Norm Left Right   Shoulder                    Date   Initial Initial   Flexion 170-180 165 161   Extension 50-60       Abduction 170-180 157 155   Adduction 50-75       Internal Rotation   T11*  65 at 60 deg abd T11*  40 at 60 deg abd   External  Rotation 80-90 90 at 60 deg abd 65 at 60 deg abd   standard testing positions unless otherwise noted; Key: ranges are reported in active range of motion unless noted as AA=active assistive or P=passive range of motion, * denotes pain   Comments: Only those motions that were assessed are noted.      Treatment   Therapeutic Exercise:   Verbal review of home program    Manual Therapy:   Seated and supine soft tissue to upper trap, posterior capsule, deltoid, rotator cuff  Long axis GH traction   Inferior GH and posterior GH mobilizations: grade II followed by passive internal rotation range of motion    Ultrasound (96649):  Patient has been made aware of potential contraindications and possible risks associated with the use of modality and has agreed.  Location: superior and posterior right shoulder  Position: sitting  Duration: 8 minutes Duty Cycle: 100% duty cycle  Frequency: 1 Mhz  Intensity: 1.5 W/cm2   Results: decreased pain;  Malnutrition Findings:                                 BMI Findings:   Less than 20        Body mass index is 17.18 kg/m².      no adverse reaction to treatment     Current Home Program (not performed this date except as noted above):   Bilateral ER/scap squeeze with yellow theraband  Shoulder extension with yellow or red theraband        ASSESSMENT   Pain in inferior -posterior aspect of shoulder with end range passive internal rotation and no change in ease of functional internal rotation following treatment. Patient will work independently on home program and return to clinic as needed    Pain after treatment: did not rate/10  Result of above outlined education: Verbalizes understanding and Demonstrates understanding    Goals:       To be obtained by end of this plan of care:  1. Patient independent with modified and progressed home exercise program.  2. Patient will decrease involved shoulder pain/symptoms to 1/10  to aid in reaching behind back for independent living tasks, tolerating 30 minutes of upper extremity activity to cook/eat a meal.   3. Patient will increase involved shoulder active range of motion to internal rotation 55° to aid in reaching behind back for independent living tasks.   4. Patient will increase involved shoulder strength to 4+/5 to aid in normalization of upper extremity movements to aid activities of independent daily living, tolerating 30 minutes of upper extremity activity to cook/eat a meal.   5. DASH: Patient will complete form to reflect an improved score from initial score of 14.17 to less than or equal to 7 (scored 0-100; a higher score indicates greater disability) to indicate pt reported improvement in function/disability/impairment (minimal detectable change: 15 points).     PLAN   Continue gentle joint mobility and manual to improve internal rotation ROM. Progress home porgram     THERAPY DAILY BILLING   Primary Insurance:  MEDICARE  Secondary Insurance: WPS    Evaluation Procedures:  No evaluation codes were used on this date of service    Timed Procedures:  Manual Therapy, 25 minutes  Therapeutic  Exercise, 5 minutes  Ultrasound, 10 minutes    Untimed Procedures:  No untimed codes were used on this date of service    Total Treatment Time: 40 minutes        G-Code:  G-Code Score ABN form  reporting not required this treatment session  Modifier based on outcome measure(s)/functional testing/clinical judgement as listed above    The referring provider's electronic or written signature on the evaluation authorizes the therapy plan of care and certifies the need for these services, furnished under this plan of care while under their care.  Physician Signature on file.

## 2024-04-07 NOTE — PROGRESS NOTES
[facility]  Progress Note  Name: Thomas Chase I  MRN: [id number]  Unit/Bed#: -01 I Date of Admission: 4/6/2024   Date of Service: 4/7/2024 I Hospital Day: 1    Assessment/Plan   * Aspiration pneumonia of right lower lobe (HCC)  Assessment & Plan  Patient presented with fevers and fatigue.  Chest x-ray revealed questionable right lower lobe pneumonia with official read pending.  Started on ceftriaxone and Flagyl.  Procalcitonin is elevated again today  CXR official read remains pending  SLP eval pending, continue home diet of pureed and nectar thick  CBC without leukocytosis and no fevers overnight, not requiring supplemental oxygen  Cont ctx/flagyl day 2      Benign prostatic hyperplasia without lower urinary tract symptoms  Assessment & Plan  Continue Flomax    Abnormal urinalysis  Assessment & Plan  On ctx, await final urine cx.    Severe protein-calorie malnutrition (HCC)  Assessment & Plan  Malnutrition Findings:                                 BMI Findings:   Less than 20        Body mass index is 17.18 kg/m².       Dysphagia  Assessment & Plan  Cont dysphagia diet, SLP pending    Sensorineural hearing loss (SNHL), bilateral  Assessment & Plan  Very hard of hearing.  Utilizes lipreading.  Continue supportive care    Constipation due to neurogenic bowel  Assessment & Plan  Continue bowel regime    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HCC)  Assessment & Plan  Continue supportive care.  Continue zoloft    Hypercholesterolemia  Assessment & Plan  Continue statin               VTE Pharmacologic Prophylaxis: VTE Score: 5 High Risk (Score >/= 5) - Pharmacological DVT Prophylaxis Ordered: enoxaparin (Lovenox). Sequential Compression Devices Ordered.    Mobility:   Basic Mobility Inpatient Raw Score: 15  JH-HLM Goal: 4: Move to chair/commode  JH-HLM Achieved: 5: Stand (1 or more minutes)  JH-HLM Goal NOT achieved. Continue with multidisciplinary rounding and encourage appropriate mobility to  "improve upon [facility] goals.    Patient Centered Rounds: I performed bedside rounds with nursing staff today.   Discussions with Specialists or Other Care Team Provider: none    Education and Discussions with Family / Patient:  will call if his wife/daughter are not in to visit.     Total Time Spent on Date of Encounter in care of patient: 35 mins. This time was spent on one or more of the following: performing physical exam; counseling and coordination of care; obtaining or reviewing history; documenting in the medical record; reviewing/ordering tests, medications or procedures; communicating with other healthcare professionals and discussing with patient's family/caregivers.    Current Length of Stay: 1 day(s)  Current Patient Status: Inpatient   Certification Statement: The patient will continue to require additional inpatient hospital stay due to pneumonia  Discharge Plan: Anticipate discharge in 24-48 hrs to discharge location to be determined pending rehab evaluations.    Code Status: Level 3 - DNAR and DNI    Subjective:   \"I'm good\"  Limited hpi due to communication challenges    Objective:     Vitals:   Temp (24hrs), Av.8 °F (37.1 °C), Min:97.7 °F (36.5 °C), Max:99.6 °F (37.6 °C)    Temp:  [97.7 °F (36.5 °C)-99.6 °F (37.6 °C)] 97.7 °F (36.5 °C)  HR:  [] 72  Resp:  [16-18] 18  BP: ()/(55-74) 103/58  SpO2:  [92 %-97 %] 97 %  Body mass index is 17.18 kg/m².     Input and Output Summary (last 24 hours):     Intake/Output Summary (Last 24 hours) at 2024 0837  Last data filed at 2024 0556  Gross per 24 hour   Intake 120 ml   Output 330 ml   Net -210 ml       Physical Exam:   Physical Exam  Vitals and nursing note reviewed.   Constitutional:       Appearance: He is not ill-appearing or diaphoretic.   HENT:      Head: Normocephalic.      Nose: Nose normal. No congestion.      Mouth/Throat:      Mouth: Mucous membranes are moist.      Pharynx: No oropharyngeal exudate.   Eyes:      General: No " scleral icterus.     Conjunctiva/sclera: Conjunctivae normal.   Cardiovascular:      Rate and Rhythm: Normal rate and regular rhythm.   Pulmonary:      Effort: Pulmonary effort is normal. No respiratory distress.      Breath sounds: No wheezing or rales.   Abdominal:      General: Bowel sounds are normal. There is no distension.      Palpations: Abdomen is soft.      Tenderness: There is no abdominal tenderness.   Genitourinary:     Comments: No brooks  Musculoskeletal:         General: No swelling or tenderness.      Cervical back: Neck supple. No rigidity.      Right lower leg: No edema.      Left lower leg: No edema.   Skin:     General: Skin is warm.      Coloration: Skin is not jaundiced.   Neurological:      Mental Status: He is alert.   Psychiatric:         Behavior: Behavior normal.          Additional Data:     Labs:  Results from last 7 days   Lab Units 04/07/24  0426   WBC Thousand/uL 7.66   HEMOGLOBIN g/dL 10.8*   HEMATOCRIT % 34.6*   PLATELETS Thousands/uL 330   NEUTROS PCT % 75   LYMPHS PCT % 17   MONOS PCT % 7   EOS PCT % 1     Results from last 7 days   Lab Units 04/07/24  0426   SODIUM mmol/L 138   POTASSIUM mmol/L 3.9   CHLORIDE mmol/L 103   CO2 mmol/L 28   BUN mg/dL 19   CREATININE mg/dL 0.65   ANION GAP mmol/L 7   CALCIUM mg/dL 8.4   ALBUMIN g/dL 3.1*   TOTAL BILIRUBIN mg/dL 0.42   ALK PHOS U/L 78   ALT U/L 25   AST U/L 17   GLUCOSE RANDOM mg/dL 86     Results from last 7 days   Lab Units 04/06/24  1636   INR  0.96             Results from last 7 days   Lab Units 04/07/24  0426 04/06/24  1636   LACTIC ACID mmol/L  --  1.5   PROCALCITONIN ng/ml 0.64* 0.26*       Lines/Drains:  Invasive Devices       Peripheral Intravenous Line  Duration             Peripheral IV 04/06/24 Left Forearm <1 day                          Imaging: Personally reviewed the following imaging: chest xray    Recent Cultures (last 7 days):   Results from last 7 days   Lab Units 04/06/24  1636   BLOOD CULTURE  Received in  Microbiology Lab. Culture in Progress.       Last 24 Hours Medication List:   Current Facility-Administered Medications   Medication Dose Route Frequency Provider Last Rate    acetaminophen  650 mg Oral Q6H PRN Tomy Fierro MD      cefTRIAXone  1,000 mg Intravenous Q24H Tomy Fierro MD      cyanocobalamin  500 mcg Oral Daily Tomy Fierro MD      enoxaparin  40 mg Subcutaneous Daily Tomy Fierro MD      lactated ringers  75 mL/hr Intravenous Continuous Tomy Fierro MD 75 mL/hr (04/06/24 2000)    melatonin  6 mg Oral HS Tomy Fierro MD      metroNIDAZOLE  500 mg Intravenous Q8H Kenrick Lui  mg (04/07/24 0416)    midodrine  5 mg Oral TID AC Tomy Fierro MD      polyethylene glycol  17 g Oral Daily PRN Tomy Fierro MD      pravastatin  40 mg Oral Daily With Dinner Tomy Fierro MD      sertraline  25 mg Oral Daily Tomy Fierro MD      tamsulosin  0.4 mg Oral Daily With Dinner Tomy Fierro MD          Today, Patient Was Seen By: Tomy Fierro MD    **Please Note: This note may have been constructed using a voice recognition system.**

## 2024-04-07 NOTE — ASSESSMENT & PLAN NOTE
Patient presented with fevers and fatigue.  Chest x-ray revealed questionable right lower lobe pneumonia with official read pending.  Started on ceftriaxone and Flagyl.  Procalcitonin is elevated again today  CXR official read remains pending  SLP eval pending, continue home diet of pureed and nectar thick  CBC without leukocytosis and no fevers overnight, not requiring supplemental oxygen  Cont ctx/flagyl day 2

## 2024-04-07 NOTE — PLAN OF CARE
Problem: Prexisting or High Potential for Compromised Skin Integrity  Goal: Skin integrity is maintained or improved  Description: INTERVENTIONS:  - Identify patients at risk for skin breakdown  - Assess and monitor skin integrity  - Assess and monitor nutrition and hydration status  - Monitor labs   - Assess for incontinence   - Turn and reposition patient  - Assist with mobility/ambulation  - Relieve pressure over bony prominences  - Avoid friction and shearing  - Provide appropriate hygiene as needed including keeping skin clean and dry  - Evaluate need for skin moisturizer/barrier cream  - Collaborate with interdisciplinary team   - Patient/family teaching  - Consider wound care consult   Outcome: Progressing     Problem: PAIN - ADULT  Goal: Verbalizes/displays adequate comfort level or baseline comfort level  Description: Interventions:  - Encourage patient to monitor pain and request assistance  - Assess pain using appropriate pain scale  - Administer analgesics based on type and severity of pain and evaluate response  - Implement non-pharmacological measures as appropriate and evaluate response  - Consider cultural and social influences on pain and pain management  - Notify physician/advanced practitioner if interventions unsuccessful or patient reports new pain  Outcome: Progressing     Problem: INFECTION - ADULT  Goal: Absence or prevention of progression during hospitalization  Description: INTERVENTIONS:  - Assess and monitor for signs and symptoms of infection  - Monitor lab/diagnostic results  - Monitor all insertion sites, i.e. indwelling lines, tubes, and drains  - Monitor endotracheal if appropriate and nasal secretions for changes in amount and color  - [place] appropriate cooling/warming therapies per order  - Administer medications as ordered  - Instruct and encourage patient and family to use good hand hygiene technique  - Identify and instruct in appropriate isolation precautions for  identified infection/condition  Outcome: Progressing  Goal: Absence of fever/infection during neutropenic period  Description: INTERVENTIONS:  - Monitor WBC    Outcome: Progressing     Problem: SAFETY ADULT  Goal: Patient will remain free of falls  Description: INTERVENTIONS:  - Educate patient/family on patient safety including physical limitations  - Instruct patient to call for assistance with activity   - Consult OT/PT to assist with strengthening/mobility   - Keep Call bell within reach  - Keep bed low and locked with side rails adjusted as appropriate  - Keep care items and personal belongings within reach  - Initiate and maintain comfort rounds  - Make Fall Risk Sign visible to staff  - Offer Toileting every 2 Hours, in advance of need  - Initiate/Maintain bed alarm  - Obtain necessary fall risk management equipment:  socks  - Apply yellow socks and bracelet for high fall risk patients  - Consider moving patient to room near nurses station  Outcome: Progressing  Goal: Maintain or return to baseline ADL function  Description: INTERVENTIONS:  -  Assess patient's ability to carry out ADLs; assess patient's baseline for ADL function and identify physical deficits which impact ability to perform ADLs (bathing, care of mouth/teeth, toileting, grooming, dressing, etc.)  - Assess/evaluate cause of self-care deficits   - Assess range of motion  - Assess patient's mobility; develop plan if impaired  - Assess patient's need for assistive devices and provide as appropriate  - Encourage maximum independence but intervene and supervise when necessary  - Involve family in performance of ADLs  - Assess for home care needs following discharge   - Consider OT consult to assist with ADL evaluation and planning for discharge  - Provide patient education as appropriate  Outcome: Progressing  Goal: Maintains/Returns to pre admission functional level  Description: INTERVENTIONS:  - Perform AM-PAC 6 Click Basic Mobility/ Daily  Activity assessment daily.  - Set and communicate daily mobility goal to care team and patient/family/caregiver.   - Collaborate with rehabilitation services on mobility goals if consulted  - Perform Range of Motion 3 times a day.  - Reposition patient every 2 hours.  - Dangle patient 3 times a day  - Stand patient 3 times a day  - Ambulate patient 3 times a day  - Out of bed to chair 3 times a day   - Out of bed for meals 3 times a day  - Out of bed for toileting  - Record patient progress and toleration of activity level   Outcome: Progressing     Problem: DISCHARGE PLANNING  Goal: Discharge to home or other facility with appropriate resources  Description: INTERVENTIONS:  - Identify barriers to discharge w/patient and caregiver  - Arrange for needed discharge resources and transportation as appropriate  - Identify discharge learning needs (meds, wound care, etc.)  - Arrange for interpretive services to assist at discharge as needed  - Refer to Case Management Department for coordinating discharge planning if the patient needs post-hospital services based on physician/advanced practitioner order or complex needs related to functional status, cognitive ability, or social support system  Outcome: Progressing     Problem: Knowledge Deficit  Goal: Patient/family/caregiver demonstrates understanding of disease process, treatment plan, medications, and discharge instructions  Description: Complete learning assessment and assess knowledge base.  Interventions:  - Provide teaching at level of understanding  - Provide teaching via preferred learning methods  Outcome: Progressing

## 2024-04-08 ENCOUNTER — TRANSITIONAL CARE MANAGEMENT (OUTPATIENT)
Dept: FAMILY MEDICINE CLINIC | Facility: CLINIC | Age: 84
End: 2024-04-08

## 2024-04-08 LAB
ANION GAP SERPL CALCULATED.3IONS-SCNC: 6 MMOL/L (ref 4–13)
BASOPHILS # BLD AUTO: 0.03 THOUSANDS/ÂΜL (ref 0–0.1)
BASOPHILS NFR BLD AUTO: 1 % (ref 0–1)
BUN SERPL-MCNC: 16 MG/DL (ref 5–25)
CALCIUM SERPL-MCNC: 8.6 MG/DL (ref 8.4–10.2)
CHLORIDE SERPL-SCNC: 106 MMOL/L (ref 96–108)
CO2 SERPL-SCNC: 29 MMOL/L (ref 21–32)
CREAT SERPL-MCNC: 0.64 MG/DL (ref 0.6–1.3)
EOSINOPHIL # BLD AUTO: 0.17 THOUSAND/ÂΜL (ref 0–0.61)
EOSINOPHIL NFR BLD AUTO: 3 % (ref 0–6)
ERYTHROCYTE [DISTWIDTH] IN BLOOD BY AUTOMATED COUNT: 14.1 % (ref 11.6–15.1)
GFR SERPL CREATININE-BSD FRML MDRD: 89 ML/MIN/1.73SQ M
GLUCOSE SERPL-MCNC: 82 MG/DL (ref 65–140)
HCT VFR BLD AUTO: 34.9 % (ref 36.5–49.3)
HGB BLD-MCNC: 11.1 G/DL (ref 12–17)
IMM GRANULOCYTES # BLD AUTO: 0.02 THOUSAND/UL (ref 0–0.2)
IMM GRANULOCYTES NFR BLD AUTO: 0 % (ref 0–2)
LYMPHOCYTES # BLD AUTO: 1.45 THOUSANDS/ÂΜL (ref 0.6–4.47)
LYMPHOCYTES NFR BLD AUTO: 26 % (ref 14–44)
MCH RBC QN AUTO: 31.2 PG (ref 26.8–34.3)
MCHC RBC AUTO-ENTMCNC: 31.8 G/DL (ref 31.4–37.4)
MCV RBC AUTO: 98 FL (ref 82–98)
MONOCYTES # BLD AUTO: 0.49 THOUSAND/ÂΜL (ref 0.17–1.22)
MONOCYTES NFR BLD AUTO: 9 % (ref 4–12)
NEUTROPHILS # BLD AUTO: 3.47 THOUSANDS/ÂΜL (ref 1.85–7.62)
NEUTS SEG NFR BLD AUTO: 61 % (ref 43–75)
NRBC BLD AUTO-RTO: 0 /100 WBCS
PLATELET # BLD AUTO: 346 THOUSANDS/UL (ref 149–390)
PMV BLD AUTO: 9.5 FL (ref 8.9–12.7)
POTASSIUM SERPL-SCNC: 3.9 MMOL/L (ref 3.5–5.3)
RBC # BLD AUTO: 3.56 MILLION/UL (ref 3.88–5.62)
SODIUM SERPL-SCNC: 141 MMOL/L (ref 135–147)
WBC # BLD AUTO: 5.63 THOUSAND/UL (ref 4.31–10.16)

## 2024-04-08 PROCEDURE — 97163 PT EVAL HIGH COMPLEX 45 MIN: CPT

## 2024-04-08 PROCEDURE — 92610 EVALUATE SWALLOWING FUNCTION: CPT

## 2024-04-08 PROCEDURE — 80048 BASIC METABOLIC PNL TOTAL CA: CPT | Performed by: INTERNAL MEDICINE

## 2024-04-08 PROCEDURE — 85025 COMPLETE CBC W/AUTO DIFF WBC: CPT | Performed by: INTERNAL MEDICINE

## 2024-04-08 PROCEDURE — 99232 SBSQ HOSP IP/OBS MODERATE 35: CPT | Performed by: STUDENT IN AN ORGANIZED HEALTH CARE EDUCATION/TRAINING PROGRAM

## 2024-04-08 PROCEDURE — 97167 OT EVAL HIGH COMPLEX 60 MIN: CPT

## 2024-04-08 RX ORDER — LANOLIN ALCOHOL/MO/W.PET/CERES
6 CREAM (GRAM) TOPICAL
Status: DISCONTINUED | OUTPATIENT
Start: 2024-04-08 | End: 2024-04-10 | Stop reason: HOSPADM

## 2024-04-08 RX ORDER — QUETIAPINE FUMARATE 25 MG/1
12.5 TABLET, FILM COATED ORAL
Status: DISCONTINUED | OUTPATIENT
Start: 2024-04-08 | End: 2024-04-10 | Stop reason: HOSPADM

## 2024-04-08 RX ADMIN — CYANOCOBALAMIN TAB 500 MCG 500 MCG: 500 TAB at 09:00

## 2024-04-08 RX ADMIN — MIDODRINE HYDROCHLORIDE 5 MG: 5 TABLET ORAL at 17:38

## 2024-04-08 RX ADMIN — MIDODRINE HYDROCHLORIDE 5 MG: 5 TABLET ORAL at 13:16

## 2024-04-08 RX ADMIN — PRAVASTATIN SODIUM 40 MG: 40 TABLET ORAL at 17:38

## 2024-04-08 RX ADMIN — QUETIAPINE FUMARATE 12.5 MG: 25 TABLET ORAL at 23:13

## 2024-04-08 RX ADMIN — TAMSULOSIN HYDROCHLORIDE 0.4 MG: 0.4 CAPSULE ORAL at 17:38

## 2024-04-08 RX ADMIN — CEFTRIAXONE 1000 MG: 1 INJECTION, SOLUTION INTRAVENOUS at 17:42

## 2024-04-08 RX ADMIN — SERTRALINE HYDROCHLORIDE 25 MG: 25 TABLET ORAL at 09:00

## 2024-04-08 RX ADMIN — MIDODRINE HYDROCHLORIDE 5 MG: 5 TABLET ORAL at 06:03

## 2024-04-08 RX ADMIN — METRONIDAZOLE 500 MG: 500 INJECTION, SOLUTION INTRAVENOUS at 03:42

## 2024-04-08 RX ADMIN — MAGNESIUM HYDROXIDE 30 ML: 400 SUSPENSION ORAL at 13:16

## 2024-04-08 RX ADMIN — Medication 6 MG: at 21:56

## 2024-04-08 RX ADMIN — ENOXAPARIN SODIUM 40 MG: 40 INJECTION SUBCUTANEOUS at 09:36

## 2024-04-08 RX ADMIN — METRONIDAZOLE 500 MG: 500 INJECTION, SOLUTION INTRAVENOUS at 09:00

## 2024-04-08 RX ADMIN — METRONIDAZOLE 500 MG: 500 INJECTION, SOLUTION INTRAVENOUS at 19:14

## 2024-04-08 NOTE — PLAN OF CARE
Problem: Prexisting or High Potential for Compromised Skin Integrity  Goal: Skin integrity is maintained or improved  Description: INTERVENTIONS:  - Identify patients at risk for skin breakdown  - Assess and monitor skin integrity  - Assess and monitor nutrition and hydration status  - Monitor labs   - Assess for incontinence   - Turn and reposition patient  - Assist with mobility/ambulation  - Relieve pressure over bony prominences  - Avoid friction and shearing  - Provide appropriate hygiene as needed including keeping skin clean and dry  - Evaluate need for skin moisturizer/barrier cream  - Collaborate with interdisciplinary team   - Patient/family teaching  - Consider wound care consult   Outcome: Progressing     Problem: PAIN - ADULT  Goal: Verbalizes/displays adequate comfort level or baseline comfort level  Description: Interventions:  - Encourage patient to monitor pain and request assistance  - Assess pain using appropriate pain scale  - Administer analgesics based on type and severity of pain and evaluate response  - Implement non-pharmacological measures as appropriate and evaluate response  - Consider cultural and social influences on pain and pain management  - Notify physician/advanced practitioner if interventions unsuccessful or patient reports new pain  Outcome: Progressing     Problem: INFECTION - ADULT  Goal: Absence or prevention of progression during hospitalization  Description: INTERVENTIONS:  - Assess and monitor for signs and symptoms of infection  - Monitor lab/diagnostic results  - Monitor all insertion sites, i.e. indwelling lines, tubes, and drains  - Monitor endotracheal if appropriate and nasal secretions for changes in amount and color  - [place] appropriate cooling/warming therapies per order  - Administer medications as ordered  - Instruct and encourage patient and family to use good hand hygiene technique  - Identify and instruct in appropriate isolation precautions for  identified infection/condition  Outcome: Progressing  Goal: Absence of fever/infection during neutropenic period  Description: INTERVENTIONS:  - Monitor WBC    Outcome: Progressing     Problem: SAFETY ADULT  Goal: Patient will remain free of falls  Description: INTERVENTIONS:  - Educate patient/family on patient safety including physical limitations  - Instruct patient to call for assistance with activity   - Consult OT/PT to assist with strengthening/mobility   - Keep Call bell within reach  - Keep bed low and locked with side rails adjusted as appropriate  - Keep care items and personal belongings within reach  - Initiate and maintain comfort rounds  - Make Fall Risk Sign visible to staff  - Offer Toileting every 2 Hours, in advance of need  - Initiate/Maintain bed alarm  - Obtain necessary fall risk management equipment: bed alarm  - Apply yellow socks and bracelet for high fall risk patients  - Consider moving patient to room near nurses station  Outcome: Progressing  Goal: Maintain or return to baseline ADL function  Description: INTERVENTIONS:  -  Assess patient's ability to carry out ADLs; assess patient's baseline for ADL function and identify physical deficits which impact ability to perform ADLs (bathing, care of mouth/teeth, toileting, grooming, dressing, etc.)  - Assess/evaluate cause of self-care deficits   - Assess range of motion  - Assess patient's mobility; develop plan if impaired  - Assess patient's need for assistive devices and provide as appropriate  - Encourage maximum independence but intervene and supervise when necessary  - Involve family in performance of ADLs  - Assess for home care needs following discharge   - Consider OT consult to assist with ADL evaluation and planning for discharge  - Provide patient education as appropriate  Outcome: Progressing  Goal: Maintains/Returns to pre admission functional level  Description: INTERVENTIONS:  - Perform AM-PAC 6 Click Basic Mobility/ Daily  Activity assessment daily.  - Set and communicate daily mobility goal to care team and patient/family/caregiver.   - Collaborate with rehabilitation services on mobility goals if consulted  - Perform Range of Motion 2 times a day.  - Reposition patient every 2 hours.  - Dangle patient 2 times a day  - Stand patient 2 times a day  - Ambulate patient 2 times a day  - Out of bed to chair 2 times a day   - Out of bed for meals 2 times a day  - Out of bed for toileting  - Record patient progress and toleration of activity level   Outcome: Progressing     Problem: DISCHARGE PLANNING  Goal: Discharge to home or other facility with appropriate resources  Description: INTERVENTIONS:  - Identify barriers to discharge w/patient and caregiver  - Arrange for needed discharge resources and transportation as appropriate  - Identify discharge learning needs (meds, wound care, etc.)  - Arrange for interpretive services to assist at discharge as needed  - Refer to Case Management Department for coordinating discharge planning if the patient needs post-hospital services based on physician/advanced practitioner order or complex needs related to functional status, cognitive ability, or social support system  Outcome: Progressing     Problem: Knowledge Deficit  Goal: Patient/family/caregiver demonstrates understanding of disease process, treatment plan, medications, and discharge instructions  Description: Complete learning assessment and assess knowledge base.  Interventions:  - Provide teaching at level of understanding  - Provide teaching via preferred learning methods  Outcome: Progressing     Problem: Nutrition/Hydration-ADULT  Goal: Nutrient/Hydration intake appropriate for improving, restoring or maintaining nutritional needs  Description: Monitor and assess patient's nutrition/hydration status for malnutrition. Collaborate with interdisciplinary team and initiate plan and interventions as ordered.  Monitor patient's weight and  dietary intake as ordered or per policy. Utilize nutrition screening tool and intervene as necessary. Determine patient's food preferences and provide high-protein, high-caloric foods as appropriate.     INTERVENTIONS:  - Monitor oral intake, urinary output, labs, and treatment plans  - Assess nutrition and hydration status and recommend course of action  - Evaluate amount of meals eaten  - Assist patient with eating if necessary   - Allow adequate time for meals  - Recommend/ encourage appropriate diets, oral nutritional supplements, and vitamin/mineral supplements  - Order, calculate, and assess calorie counts as needed  - Recommend, monitor, and adjust tube feedings and TPN/PPN based on assessed needs  - Assess need for intravenous fluids  - Provide specific nutrition/hydration education as appropriate  - Include patient/family/caregiver in decisions related to nutrition  Outcome: Progressing

## 2024-04-08 NOTE — SPEECH THERAPY NOTE
Speech Language/Pathology  Speech/Language Pathology  Assessment    Patient Name: Thomas Chase  Today's Date: 4/8/2024     Problem List  Principal Problem:    Aspiration pneumonia of right lower lobe (HCC)  Active Problems:    Hypercholesterolemia    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HCC)    Constipation due to neurogenic bowel    Sensorineural hearing loss (SNHL), bilateral    Dysphagia    Severe protein-calorie malnutrition (HCC)    Abnormal urinalysis    Benign prostatic hyperplasia without lower urinary tract symptoms    Past Medical History  Past Medical History:   Diagnosis Date    Arthritis     Encounter for general adult medical examination without abnormal findings 03/20/2019    Fall 11/03/2022    Hyperlipidemia     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    Macular degeneration, wet (HCC)     Urinary retention 06/02/2022     Past Surgical History  Past Surgical History:   Procedure Laterality Date    BRAIN HEMATOMA EVACUATION Left 05/28/2022    Procedure: left CRANIOTOMY FOR SUBDURAL HEMATOMA;  Surgeon: Chris Watts MD;  Location:  MAIN OR;  Service: Neurosurgery    CARPAL TUNNEL RELEASE      HERNIA REPAIR Right     inguinal    IR CEREBRAL ANGIOGRAPHY / INTERVENTION  06/02/2022    LA COCHLEAR DEVICE IMPLANTATION W/WO MASTOIDECTOMY Left 1/17/2023    Procedure: LEFT COCHLEAR IMPLANTATION WITH MASTOIDECTOMY AND FACIAL NERVE MONITORING WITH AUDIOMETRIC TESTING OF THE IMPLANT;  Surgeon: Susie Tuttle MD;  Location:  MAIN OR;  Service: ENT    LA NEUROPLASTY &/TRANSPOS MEDIAN NRV CARPAL TUNNE Right 09/20/2021    Procedure: RELEASE CARPAL TUNNEL;  Surgeon: Bruno Segovia MD;  Location: [facility];  Service: Orthopedics      Bedside Swallow Evaluation:    Summary:  Pt presented w/ mild oral and mod pharyngeal dysphagia. Pt known to this ST from prior admits. Positioned upright and alert. Wife at bedside. Fed by ST.  Accepted trials of puree and nectar thick liquids via straw with single sips. Pt  does demonstrate impulsive rate of intake. Does not consistently follow verbal commands to utilize small sips, straw pinch utilized by ST to ensure slow rate. Bolus control, formation, and transfer appeared WFL. Swallow initiation appeared mildly delayed. Laryngeal rise upon palpation appeared weak. Intermittent wet vocal quality. ST cued pt to initiate double swallow to clear residue in pharynx as seen in prior vbs . Pt did not initiate secondary swallow independently.  Provided with verbal cues pt initiated secondary swallow however appeared very effortful.Occasional wet cough. Reviewed recommendations with spouse at bedside. Pt severe protein calorie malnutrition. May need to consider alternate means of nutrition if inefficient oral intake.     Recommendations:  Diet: Dysphagia 1 pureed   Liquid:Nectar thick  Meds:Crushed with puree   Supervision: Full   Positioning:Upright  Strategies: slow rate, alternate liquids with solids, double swallow, and effortful swallow   Pt to take PO/Meds only when fully alert and upright.   Oral care  Aspiration precautions  Reflux precautions  Therapy Prognosis:guarded  Prognosis considerations:age, medical status, cognitive status  Frequency:2-5 times weekly as indicated    Consider consult w/:  Rehab  Nutrition  POC?    Goal(s):  Dysphagia LTG  -Patient will demonstrate safe and effective oral intake (without overt s/s significant oral/pharyngeal dysphagia including s/s penetration or aspiration) for the highest appropriate diet level.     1.Pt will tolerate least restrictive diet w/out s/s aspiration or oral/pharyngeal difficulties.   2.Pt will will effectively manipulate/masticate and transfer purees/solids w/out s/s dysphagia/aspiration.   3.Pt will tolerate thin liquids w/out s/s aspiration.   -If indicated, patient will comply with a Video/Modified Barium Swallow study for more complete assessment of swallowing anatomy/physiology/aspiration risk and to assess efficacy of  treatment techniques so as to best guide treatment plan     H&P/Admit info/ pertinent provider notes: (PMH noted above)  Thomas Chase is a 83 y.o. male with a PMH of Vascular dementia, BPH who presents with fever, fatigue and alteration in mental status.  He was just discharged from acute rehab after 6 weeks there..  He has a worsening cough with hoarseness.  The cough is worse with drinking/eating. He utilizes a dysphagia diet at home.  He had a low appetite.  He uses miralax and senna.  He just came home from acute rehab yesterday and his family noted that he had some chills with a temperature of 100.  He was really only in and out of bed which is unlike him he would normally be more active through the house.     Special Studies:  XR chest  view portable (04/08/2024) impression   Right lower lobe pneumonia   CT No acute intracranial abnormality.  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  Evaluation of the left temporal lobe is limited due to artifact from the cochlear implant but there may be mild developing encephalomalacia related to prior contusion.head wo contrast (03/16/2024) impression     Procalcitonin: 0.64                           04/08/2024   WBC: 5.63                             04/08/2024     Code Status : Level 3 DNR/DNI     Previous MBS: (03/11/2024) Impression   Oral Phase : Pt presented with mild oral dysphagia. Bolus control, formation, and transfer were mildly reduced. Mild premature spill occurred. Trace posterior base of tongue residue.   Pharyngeal Phase : Pt presented with mod/severe pharyngeal dysphagia. Swallow initiation was delayed. Premature spillage occurred to the valleculae with all trials. With trials of thin liquids premature spill occurred to the pyriforms. Base of tongue retraction was reduced. Hyoid laryngeal elevation was variable WFL vs mild/mod reduced. Pharyngeal constriction and air way closure were reduced. Epiglottic inversion was  "incomplete. Mod to Max vallecular retention occurred with all consistencies. Pyriform retention occurred with all consistencies.   Phillip aspiration occurred during and after the swallow with thin liquids via cup/straw. Pt had no sensation of aspiration events. Increased difficulty following verbal and written commands due to cognitive status and [place].  Cued cough appeared weak and ineffective in clearing aspirated residue. Pt will independently initiate multiple swallows (3-4) to clear residue however min successful in clearing residue.  Per Esophageal screen   All material cleared adequately   Observations: Pt [place] and hx of cognitive deficits. ST provided verbal and written directions however increased difficulty following. ST did administer solids or 13mm pill as safety concern.   Recommendations:  Diet: Dysphagia 1 pureed   Liquids: Nectar   Strategies: slow rate, small sips, alternate liquids with solids, double swallow and effortful swallow.   Upright position  F/u ST tx: yes  Full/Supervision  Aspiration Precautions  Reflux Precautions  Consider consult with: Nutrition   Results reviewed with: pt, OP SLP  Aspiration precautions posted.  If a dedicated assessment of the esophagus is desired, consider esophagram/barium swallow or EGD.    Patient's goal:\" water\"    Did the pt report pain? no  If yes, was nursing notified/was it addressed? N/a     Reason for consult:  R/o aspiration  Determine safest and least restrictive diet  current pna  poor intake  weight loss   h/o dysphagia     Precautions:  Fall     Food Allergies: No known    Current Diet: Dysphagia 1 pureed and ntl    Premorbid diet: Dysphagia 1 pureed and ntl    O2 requirement: Room Air    Social/Prior living Lives with spouse    Voice/Speech: aphonic   Follows commands: Basic intermittently   Cognitive status: Alert      Oral [facility] exam:  Dentition: Adequate   Lips (VII):WNL  Tongue (XII): midline   Mandible (V):adequate ROM   Face/oral sensation " (V):WNL, symmetrical   Velum (X):WNL    Items administered:  Puree, and nectar thick liquids via straw with single sips    Oral stage:  Lip closure:WNL  Mastication: DNT  Bolus formation: WFL  Bolus control:WFL  Transfer:reduced    Pharyngeal stage:  Swallow promptness:appeared delayed  Laryngeal rise:weak   Wet voice: present  Cough: present     Esophageal stage:  No s/s reported  H/o GERD    Aspiration precautions posted    Results d/w:  Pt, nursing, family,

## 2024-04-08 NOTE — ASSESSMENT & PLAN NOTE
Malnutrition Findings:   Adult Malnutrition type: Chronic illness  Adult Degree of Malnutrition: Other severe protein calorie malnutrition  Malnutrition Characteristics: Fat loss, Muscle loss, Weight loss                  360 Statement: Malnutrition related to chronic illness as evidenced by >20% body weight loss in 1 year, 10% body weight loss in 6 months, severe orbital adipose loss, severe temporal muscle loss, severe clavicle muscle loss.  To treat with oral diet and nutrition supplements as tolerated.    BMI Findings:  Adult BMI Classifications: Underweight < 18.5Less than 20        Body mass index is 17.18 kg/m².

## 2024-04-08 NOTE — OCCUPATIONAL THERAPY NOTE
Occupational Therapy Evaluation     Patient Name: Thomas Chase  Today's Date: 4/8/2024  Problem List  Principal Problem:    Aspiration pneumonia of right lower lobe (HCC)  Active Problems:    Hypercholesterolemia    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HCC)    Constipation due to neurogenic bowel    Sensorineural hearing loss (SNHL), bilateral    Dysphagia    Severe protein-calorie malnutrition (HCC)    Abnormal urinalysis    Benign prostatic hyperplasia without lower urinary tract symptoms    Past Medical History  Past Medical History:   Diagnosis Date    Arthritis     Encounter for general adult medical examination without abnormal findings 03/20/2019    Fall 11/03/2022    Hyperlipidemia     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    Macular degeneration, wet (HCC)     Urinary retention 06/02/2022     Past Surgical History  Past Surgical History:   Procedure Laterality Date    BRAIN HEMATOMA EVACUATION Left 05/28/2022    Procedure: left CRANIOTOMY FOR SUBDURAL HEMATOMA;  Surgeon: Chris Watts MD;  Location:  MAIN OR;  Service: Neurosurgery    CARPAL TUNNEL RELEASE      HERNIA REPAIR Right     inguinal    IR CEREBRAL ANGIOGRAPHY / INTERVENTION  06/02/2022    AR COCHLEAR DEVICE IMPLANTATION W/WO MASTOIDECTOMY Left 1/17/2023    Procedure: LEFT COCHLEAR IMPLANTATION WITH MASTOIDECTOMY AND FACIAL NERVE MONITORING WITH AUDIOMETRIC TESTING OF THE IMPLANT;  Surgeon: Susie Tuttle MD;  Location:  MAIN OR;  Service: ENT    AR NEUROPLASTY &/TRANSPOS MEDIAN NRV CARPAL TUNNE Right 09/20/2021    Procedure: RELEASE CARPAL TUNNEL;  Surgeon: Bruno Segovia MD;  Location: [facility];  Service: Orthopedics         04/08/24 1422   OT Last Visit   OT Visit Date 04/08/24   Note Type   Note type Evaluation   Additional Comments Nsg staff verbally cleared pt for OT evaluation.  Pt received  supine in bed on this date reporting no pain + is agreeable to OT session @ this time. @ exit, pt remains in  seated in bedside  recliner c all lines intact, all needs met, call bell in hand + nsg aware of location/disposition.  (During evaluation, wife present in room.)   Pain Assessment   Pain Assessment Tool 0-10   Pain Score No Pain   Restrictions/Precautions   Other Precautions Cognitive;Chair Alarm;Bed Alarm;Aspiration;Fall Risk;Hard of hearing;Impulsive  (Poor historian)   Home Living   Type of Home House   Home Layout Multi-level;Performs ADLs on one level;Able to live on main level with bedroom/bathroom;Stairs to enter with rails  (1 HATTIE)   Bathroom Shower/Tub Walk-in shower   Bathroom Toilet Standard   Bathroom Equipment Grab bars in shower;Grab bars around toilet;Shower chair   Bathroom Accessibility Accessible   Home Equipment Walker;Wheelchair-manual   Additional Comments RW for mobilityy   Prior Function   Level of [place] Needs assistance with IADLS;Needs assistance with ADLs;Needs assistance with functional mobility   Lives With Spouse   Receives Help From Family;Home health   IADLs Family/Friend/Other provides transportation;Family/Friend/Other provides meals;Family/Friend/Other provides medication management   Falls in the last 6 months 1 to 4   Lifestyle   Autonomy Pt lives in  2 story home c wife + @ baseline, performs ADLs  with A, IADLs with A + fxl mobility with A with RW. (-) . Pt is retired.   Reciprocal Relationships Family   Service to Others Retired   General   Additional General Comments [place]   ADL   Eating Assistance 5  Supervision/Setup   Grooming Assistance 5  Supervision/Setup   UB Bathing Assistance 5  Supervision/Setup   LB Bathing Assistance 4  Minimal Assistance   UB Dressing Assistance 5  Supervision/Setup   LB Dressing Assistance 4  Minimal Assistance   Toileting Assistance  4  Minimal Assistance   Additional Comments Given fxl performance skills + medical complexity, therapist suspecting via clinical judgement + skilled analysis; pt currently requires stated assist above to perform each area  of ADL d/t limitations including: generalized muscle weakness, sitting/standing balance deficits, fxl activity tolerance limitations, requires external assist to complete transitional movements, decreased core strength, decreased postural control, instability in stance, cognitive deficits, safety awareness concerns, and decreased problem solving abilities, impulsivity   Bed Mobility   Supine to Sit 5  Supervision   Additional items Assist x 1;Impulsive   Additional Comments DNT sit-sup; pt transitioned from EOB-bedside recliner.   Transfers   Sit to Stand   (CGA)   Additional items Impulsive;Verbal cues   Stand to Sit   (CGA)   Additional items Impulsive   Functional Mobility   Functional Mobility 4  Minimal assistance   Additional Comments Pt performed short distance ADL related fxl mobility in room/hallway c min A, with RW  simulating toileting distances.   No overt LOB demonstrated + pt denies pain /  denies SOB/ denies dizziness during transitional movements. Severe impulsivity noted with poor re-directability. P safety awareness noted while navigating t/o room. Transitions to bedside recliner for remainder of session.   Balance   Static Sitting Fair +   Dynamic Sitting Fair   Static Standing Fair -   Dynamic Standing Poor +   Ambulatory Poor +   Activity Tolerance   Activity Tolerance   (Severely [place])   Medical Staff Made Aware PT/OT co-eval on this date d/t medical complexity, ambulatory dysfunction c high fall risk, various impeding lines + requirement for skilled interdisciplinary analysis of appropriate d/c recommendations. PT/OT POC/goals I'ly determined per respective discipline. (Brenda)   Nurse Made Aware Medical staff made aware of current fxn, recommendations for d/c planning, fall risk + pt's location upon exit. (Rishabh)   Hand Function   Gross Motor Coordination Functional   Fine Motor Coordination Functional   Psychosocial   Psychosocial (WDL) WDL   Cognition   Overall Cognitive Status Impaired    Attention Difficulty attending to directions   Orientation Level   (Questionable; communication barrier)   Memory Decreased long term memory;Decreased short term memory   Following Commands Unable to follow one step commands   Comments Severely [place] + impulsive   Assessment   Limitation Decreased ADL status;Decreased UE strength;Decreased endurance;Decreased self-care trans;Decreased high-level ADLs;Decreased Safe judgement during ADL;Decreased cognition   Prognosis Fair   Assessment Patient is a 83 y.o. male seen for OT evaluation s/p admit to  [facility] w/Aspiration pneumonia of right lower lobe (HCC) + commorbidities/PMHx (as listed in medical record) affecting patient's functional performance c ADL tasks at time of assessment. OT orders placed for evaluation and treatment to assess pt's ADLs, cognitive status + performance during functional tasks in order to formulate appropriate d/c recommendations.     Therapist performed at least two patient identifiers during session including name and wristband. Personal factors affecting patient at time of initial evaluation include: step(s) to enter environment, multi-level environment, limited home support, advanced age, inability to perform ADLs, inability to ambulate household distances, and decreased initiation and engagement.   Pt's clinical presentation is currently unstable/unpredictable given new onset deficits that effect pt's occupational performance and ability to safely return to OF including decrease activity tolerance, decrease standing balance, decrease sitting balance, decrease performance during ADL tasks, decrease cognition, decrease safety awareness , increase impulsiveness, decrease UB MS, decrease generalized strength, decrease activity engagement, and decrease performance during functional transfers combined with medical complications of abnormal CBC, impulsivity during admission, and need for input for mobility  technique/safety. This evaluation required an extensive review of medical and/or therapy records and additional review of physical, cognitive and psychosocial history related to functional performance. Based upon functional performance deficits and assessments, this evaluation has been identified as a  high complexity evaluation.      Patient to benefit from continued Occupational Therapy treatment while in the hospital to address aforementioned deficits and maximize level of functional independence with ADLs and functional mobility. Pt currently requires A to facilitate appropriate d/c plan.   Plan   Treatment Interventions ADL retraining;Functional transfer training;UE strengthening/ROM;Endurance training;Cognitive reorientation;Patient/family training;Equipment evaluation/education;Compensatory technique education;Energy conservation;Activityengagement   Goal Expiration Date 04/18/24   OT Treatment Day 0   OT Frequency 3-5x/wk   Discharge Recommendation   Rehab Resource Intensity Level, OT II (Moderate Resource Intensity)   AM-PAC Daily Activity Inpatient   Lower Body Dressing 2   Bathing 2   Toileting 2   Upper Body Dressing 3   Grooming 3   Eating 3   Daily Activity Raw Score 15   Daily Activity Standardized Score (Calc for Raw Score >=11) 34.69   AM-PAC Applied Cognition Inpatient   Following a Speech/Presentation 1   Understanding Ordinary Conversation 1   Taking Medications 1   Remembering Where Things Are Placed or Put Away 1   Remembering List of 4-5 Errands 1   Taking Care of Complicated Tasks 1   Applied Cognition Raw Score 6   Applied Cognition Standardized Score 7.69   Barthel Index   Feeding 5   Bathing 0   Grooming Score 0   Dressing Score 5   Bladder Score 5   Bowels Score 5   Toilet Use Score 5   Transfers (Bed/Chair) Score 10   Mobility (Level Surface) Score 0   Stairs Score 0   Barthel Index Score 35   End of Consult   Patient Position at End of Consult Bedside chair       The patient's raw score  on the AM-PAC Daily Activity inpatient short form is 15, standardized score is 34.69, less than 39.4. Please refer to the recommendation of the Occupational Therapist for safe DC planning.    Resource Intensity Recommendation: Level II (Moderate Resource Intensity)        GOALS:    *ADL transfers with (S) for inc'd independence with ADLs/purposeful tasks    *UB ADL with (S) for inc'd independence with self cares    *LB ADL with (S) using AE prn for inc'd independence with self cares    *Toileting with (S) for clothing management and hygiene for return to PLOF with personal care    *Increase stand tolerance x 2  m for inc'd tolerance with standing purposeful tasks    *Participate in 10m UE therex to increase overall stamina/activity tolerance for purposeful tasks    *Bed mobility- (S) for inc'd independence to manage own comfort and initiate EOB & OOB purposeful tasks    *Patient will verbalize 3 safety awareness/ principles to prevent falls in the home setting.     *Patient will verbalize and demonstrate use of energy conservation/deep breathing techniques and work simplification skills during functional activities with no verbal cues.     *Patient will increase OOB/sitting tolerance to 2-4 hours per day to increase participation in self-care and leisure tasks with no s/s of exertion.     *Patient will engage in ongoing cognitive assessment to assist with safe discharge planning/recommendations.     SHERRY Bales/ROSA

## 2024-04-08 NOTE — PHYSICAL THERAPY NOTE
PHYSICAL THERAPY EVALUATION  NAME:  Thomas Chase  DATE: 04/08/24    AGE:   83 y.o.  Mrn:   [id number]  ADMIT DX:  Aspiration pneumonia (HCC) [J69.0]  UTI (urinary tract infection) [N39.0]  Lethargic [R53.83]  Generalized weakness [R53.1]  Problem List:   Patient Active Problem List   Diagnosis    Hypercholesterolemia    Borderline hypertension    Negative depression screening    Overweight (BMI 25.0-29.9)    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HCC)    Right hand pain    Carpal tunnel syndrome on right    Hygroma    Primary osteoarthritis of left knee    SDH (subdural hematoma) (HCC)    Hearing loss    Constipation due to neurogenic bowel    Urinary retention    Low BP    Diastolic dysfunction    EKG abnormalities    Abnormal echocardiogram    Right bundle-branch block    Balance disorder    Sensorineural hearing loss (SNHL), bilateral    Macular degeneration, age related    H/O traumatic subdural hematoma    Pharyngeal myoclonus    Dysphagia    Debility    Severe protein-calorie malnutrition (HCC)    Abnormal CT scan, lumbar spine    Bilateral lower extremity weakness    Headache    Behavior safety risk    Hematuria    Insomnia    Abnormal CT of the head    Aspiration pneumonia of right lower lobe (HCC)    Abnormal urinalysis    Benign prostatic hyperplasia without lower urinary tract symptoms       Past Medical History  Past Medical History:   Diagnosis Date    Arthritis     Encounter for general adult medical examination without abnormal findings 03/20/2019    Fall 11/03/2022    Hyperlipidemia     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    Macular degeneration, wet (HCC)     Urinary retention 06/02/2022       Past Surgical History  Past Surgical History:   Procedure Laterality Date    BRAIN HEMATOMA EVACUATION Left 05/28/2022    Procedure: left CRANIOTOMY FOR SUBDURAL HEMATOMA;  Surgeon: Chris Watts MD;  [facility];  Service: Neurosurgery    CARPAL TUNNEL RELEASE      HERNIA REPAIR Right      inguinal    IR CEREBRAL ANGIOGRAPHY / INTERVENTION  06/02/2022    AR COCHLEAR DEVICE IMPLANTATION W/WO MASTOIDECTOMY Left 1/17/2023    Procedure: LEFT COCHLEAR IMPLANTATION WITH MASTOIDECTOMY AND FACIAL NERVE MONITORING WITH AUDIOMETRIC TESTING OF THE IMPLANT;  Surgeon: Susie Tuttle MD;  Location:  MAIN OR;  Service: ENT    AR NEUROPLASTY &/TRANSPOS MEDIAN NRV CARPAL TUNNE Right 09/20/2021    Procedure: RELEASE CARPAL TUNNEL;  Surgeon: Bruno Segovia MD;  Location: [facility];  Service: Orthopedics       Length Of Stay: 2  Performed at least 2 patient identifiers during session: Name and ID bracelet     04/08/24 1421   PT Last Visit   PT Visit Date 04/08/24   Note Type   Note type Evaluation   Pain Assessment   Pain Assessment Tool FLACC   Pain Rating: FLACC (Rest) - Face 0   Pain Rating: FLACC (Rest) - Legs 0   Pain Rating: FLACC (Rest) - Activity 0   Pain Rating: FLACC (Rest) - Cry 0   Pain Rating: FLACC (Rest) - Consolability 0   Score: FLACC (Rest) 0   Pain Rating: FLACC (Activity) - Face 0   Pain Rating: FLACC (Activity) - Legs 0   Pain Rating: FLACC (Activity) - Activity 0   Pain Rating: FLACC (Activity) - Cry 0   Pain Rating: FLACC (Activity) - Consolability 0   Score: FLACC (Activity) 0   Restrictions/Precautions   Weight Bearing Precautions Per Order No   Other Precautions Cognitive;Chair Alarm;Bed Alarm;Impulsive;Fall Risk;Hard of hearing   Home Living   Type of Home House   Home Layout Multi-level;Performs ADLs on one level;Able to live on main level with bedroom/bathroom;Stairs to enter with rails  (1 HATTIE. first floor set up)   Bathroom Shower/Tub Walk-in shower   Bathroom Toilet Standard   Bathroom Equipment Grab bars in shower;Grab bars around toilet;Shower chair   Bathroom Accessibility Accessible   Home Equipment Walker;Wheelchair-manual   Additional Comments RW for mobility in the home; prn assist for functional mobility   Prior Function   Level of [place] Needs assistance with  IADLS;Needs assistance with ADLs;Needs assistance with functional mobility  (prn assist for functional mobility)   Lives With Spouse   Receives Help From Family;Home health   IADLs Family/Friend/Other provides transportation;Family/Friend/Other provides meals;Family/Friend/Other provides medication management   Falls in the last 6 months 1 to 4  (3 per pts spouse)   Comments pts spouse present to provide home set up and PLOF information as pt very hard of hearing   General   Family/Caregiver Present Yes  (spouse)   Cognition   Overall Cognitive Status   (questionable)   Arousal/Participation Alert   Orientation Level Oriented to person  (additional orientation assesment limited due to patient very hard of hearing)   Memory Unable to assess   Following Commands   (required visual or written explanation)   Comments Pt agreeable to PT evaluation   RLE Assessment   RLE Assessment X   Strength RLE   RLE Overall Strength 4-/5  (grossly assesed during functional mobility 4-/5)   LLE Assessment   LLE Assessment X   Strength LLE   LLE Overall Strength 4-/5  (grossly assesed during functional mobility 4-/5)   Bed Mobility   Supine to Sit 4  Minimal assistance   Additional items Assist x 1;HOB elevated;Increased time required;Impulsive   Sit to Supine   (not tested as pt seated in bedside chair with spouse present, RN aware, and chair alarm donned)   Transfers   Sit to Stand 4  Minimal assistance   Additional items Assist x 1;Increased time required;Impulsive   Stand to Sit 4  Minimal assistance   Additional items Assist x 1;Increased time required;Impulsive   Additional Comments RW used during transfers, visual and tactile cues to address safety and impulsivity   Ambulation/Elevation   Gait pattern Narrow ILIANA;Forward Flexion  (impulsive)   Gait Assistance 4  Minimal assist   Additional items Assist x 1;Verbal cues   Assistive Device Rolling walker   Distance 150'   Stair Management Assistance Not tested   Balance   Static  Sitting Fair +   Dynamic Sitting Fair   Static Standing Fair -   Dynamic Standing Poor +   Ambulatory Poor +   Activity Tolerance   Medical Staff Made Aware Pt seen as a co-eval with CARON Erwin due to the patient's co-morbidities, clinical presentation, and present impairments which are a regression from the patient's baseline.   Nurse Made Aware MERY Keating confirmed pt appropriate for PT evaluation and made aware of session outcomes   Assessment   Prognosis Good   Problem List Decreased endurance;Impaired balance;Decreased cognition;Decreased safety awareness;Impaired hearing   Assessment Pt is 83 y.o. male seen for high-complexity PT evaluation on 4/8/2024 s/p admit to [facility] on 4/6/2024 w/ Aspiration pneumonia of right lower lobe (HCC). PT was consulted to assess pt's functional mobility and d/c needs. Order placed for PT eval and tx, w/ ambulate patient order. PTA, pt was living with his spouse in a multi-story home with first floor set up and 1 HATTIE and required assist for ADLs, IADLs, and prn assist for functional mobility with RW. At time of eval, patient required Brayan for sup > sit, STS, and ambulation 150' with RW and visual and tactile cues for safety awareness. Upon evaluation, pt presenting with impaired functional mobility d/t decreased endurance, impaired balance, decreased cognition, decreased safety awareness, and impaired hearing. Pertinent PMHx and current co-morbidities affecting pt's physical performance at time of assessment include: aspiration pneumonia of right lower lobe, abnormal urinalysis, dysphagia, bilateral sensorineural hearing loss, ischemia vascular dementia. Personal factors affecting pt at time of eval include: ambulating w/ assistive device, stairs to enter home, decreased cognition, positive fall history, and impulsivity. The following objective measures performed on IE also reveal limitations: AM-PAC 6-Clicks: 17/24. Pt's clinical presentation is currently  unstable/unpredictable seen in pt's presentation of advanced age, need for input for task focus and mobility technique, need for minimal assist w/ all phases of mobility when usually mobilizing independently, and ongoing medical assessment. Overall, pt's rehab potential and prognosis to return to PLOF is good as impacted by objective findings, warranting pt to receive further skilled PT interventions to address impairments, activity limitations, and participation restrictions. Pt to benefit from continued PT services to address deficits as defined above and maximize level of functional independent mobility and consistency. From PT/mobility standpoint, recommendation at time of d/c would be level 2, moderate resource intensity in order to facilitate return to PLOF.   Goals   STG Expiration Date 04/18/24   Short Term Goal #1 In 10 days: Perform all bed mobility tasks with close S to decrease caregiver burden, Perform all transfers with close S to improve independence, Ambulate > 250 ft. with RW with close S w/o LOB and w/ normalized gait pattern 100% of the time, Navigate 1 stairs with min A of 1 with bilateral handrails to facilitate return to previous living environment, and Increase all balance 1/2 grade to decrease risk for falls   PT Treatment Day 0   Plan   Treatment/Interventions Functional transfer training;Elevations;Therapeutic exercise;Endurance training;Patient/family training;Bed mobility;Gait training;Spoke to nursing;OT   PT Frequency 3-5x/wk   Discharge Recommendation   Rehab Resource Intensity Level, PT II (Moderate Resource Intensity)   AM-PAC Basic Mobility Inpatient   Turning in Flat Bed Without Bedrails 3   Lying on Back to Sitting on Edge of Flat Bed Without Bedrails 3   Moving Bed to Chair 3   Standing Up From Chair Using Arms 3   Walk in Room 3   Climb 3-5 Stairs With Railing 2   Basic Mobility Inpatient Raw Score 17   Basic Mobility Standardized Score 39.67   [facility] Level Of  Mobility   JH-HLM Goal 5: Stand one or more mins   JH-HLM Achieved 7: Walk 25 feet or more   End of Consult   Patient Position at End of Consult All needs within reach;Bed/Chair alarm activated;Bedside chair     Time In: 1418  Time Out: 1436  Total Evaluation Minutes: 18    Brenda Jimenez, PT

## 2024-04-08 NOTE — CASE MANAGEMENT
Case Management Assessment & Discharge Planning Note    Patient name Thomas Chase  Location /-01 MRN [id number]  : 1940 Date 2024       Current Admission Date: 2024  Current Admission Diagnosis:Aspiration pneumonia of right lower lobe (HCC)   Patient Active Problem List    Diagnosis Date Noted    Aspiration pneumonia of right lower lobe (HCC) 2024    Abnormal urinalysis 2024    Benign prostatic hyperplasia without lower urinary tract symptoms 2024    Insomnia 2024    Abnormal CT of the head 2024    Hematuria 2024    Headache 2024    Behavior safety risk 2024    Bilateral lower extremity weakness 2024    Abnormal CT scan, lumbar spine 02/15/2024    Severe protein-calorie malnutrition (HCC) 2024    Debility 2024    Pharyngeal myoclonus 2023    Dysphagia 2023    Macular degeneration, age related 2023    H/O traumatic subdural hematoma 2023    Sensorineural hearing loss (SNHL), bilateral 2022    Balance disorder 2022    Abnormal echocardiogram 2022    Right bundle-branch block 2022    Low BP 2022    Diastolic dysfunction 2022    EKG abnormalities 2022    Constipation due to neurogenic bowel 2022    Urinary retention 2022    SDH (subdural hematoma) ([facility]) 2022    Primary osteoarthritis of left knee 2022    Hygroma 2022    Right hand pain     Carpal tunnel syndrome on right     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[facility]) 2021    Overweight (BMI 25.0-29.9) 2021    Negative depression screening 2019    Hypercholesterolemia 2018    Borderline hypertension 2018    Hearing loss 2009      LOS (days): 2  Geometric Mean LOS (GMLOS) (days): 5  Days to GMLOS:3.1     OBJECTIVE:    Risk of Unplanned Readmission Score: 25.04         Current admission status: Inpatient       [facility]   [id number]  - [place] - [address]  [address] [address]  Phone: [phone] Fax: [phone]    Primary Care Provider: RONY Diaz    Primary Insurance: MEDICARE  Secondary Insurance: [facility]    ASSESSMENT:  Active Health Care Proxies    There are no active Health Care Proxies on file.       Advance Directives  Does patient have a Health Care POA?: Yes  Does patient have Advance Directives?: Yes  Advance Directives: Living will, Power of  for health care  Primary Contact: Allegra Chase  daughter         Readmission Root Cause  30 Day Readmission: No    Patient Information  Admitted from:: Home  Mental Status: Confused  During Assessment patient was accompanied by: Daughter (I spoke with patient's daughter on the phone)  Assessment information provided by:: Daughter  Primary Caregiver:  (Pt's wife helped with ADL's . Family just set up that patient will have aides from In home referrals 12 hours a day . ( 8 hours at night and 4 hours during day. Mon -Fri)  Support Systems: Spouse/significant other, Son, Daughter  County of Residence: [facility] do you live in?: [place]  Home entry access options. Select all that apply.: Stairs  Number of steps to enter home.: 1  Type of Current Residence: [address]  Upon entering residence, is there a bedroom on the main floor (no further steps)?: Yes  Upon entering residence, is there a bathroom on the main floor (no further steps)?: Yes (Pt has a powder room on the first floor)  Is patient a ?: No    Activities of Daily Living Prior to Admission  Functional Status: Assistance  Completes ADLs independently?: No  Level of ADL dependence: Assistance  Ambulates independently?: No  Level of ambulatory dependence: Assistance  Does patient use assisted devices?: Yes  Assisted Devices (DME) used: Bedside Commode, Shower Chair, Quad Cane  Does patient currently own DME?: Yes  What DME does the  patient currently own?: Quad Cane, Shower Chair, Bedside Commode  Does patient have a history of Outpatient Therapy (PT/OT)?: Yes  Does the patient have a history of Short-Term Rehab?: Yes (Pt was at the [facility]  from 2/20/24 - 4/5/24)  Does patient have a history of HHC?: Yes  Does patient currently have HHC?: Yes    Current Home Health Care  Type of Current Home Care Services: Home OT, Home PT, Nurse visit  Current Home Health Agency:: [facility]  Current Home Health Follow-Up Provider:: PCP    Patient Information Continued  Income Source: Pension/care home  Does patient have prescription coverage?: Yes  Does patient receive dialysis treatments?: No  Does patient have a history of substance abuse?: No  Does patient have a history of Mental Health Diagnosis?: No         Means of Transportation  Means of Transport to Appts:: Family transport (Family will give the patient a ride home from the hospital)  [place] Application:  (Pt not appropriate)      Social Determinants of Health (SDOH)      Flowsheet Row Most Recent Value   Housing Stability    In the last 12 months, was there a time when you were not able to pay the mortgage or rent on time? N   In the last 12 months, how many places have you lived? 1   In the last 12 months, was there a time when you did not have a steady place to sleep or slept in a shelter (including now)? N   Transportation Needs    In the past 12 months, has lack of transportation kept you from medical appointments or from getting medications? no   In the past 12 months, has lack of transportation kept you from meetings, work, or from getting things needed for daily living? No   Food Insecurity    Within the past 12 months, you worried that your food would run out before you got the money to buy more. Never true   Within the past 12 months, the food you bought just didn't last and you didn't have money to get more. Never true   Utilities    In the past 12 months has the electric, gas, oil, or  water company threatened to shut off services in your home? No            DISCHARGE DETAILS:    Discharge planning discussed with:: Pt and daughter and wife  Freedom of Choice: Yes     CM contacted family/caregiver?: Yes (I spoke with patient's daughter Allegra)                  Requested Home Health Care         Is the patient interested in [facility] at discharge?: Yes  Home Health Discipline requested:: Nursing, Occupational Therapy, Physical Therapy  Home Health Agency Name:: [facility]  Home Health Follow-Up Provider:: PCP  Home Health Services Needed:: Gait/ADL Training, Strengthening/Theraputic Exercises to Improve Function  Homebound Criteria Met:: Uses an Assist Device (i.e. cane, walker, etc)  Supporting Clincal Findings:: Fatigues Easliy in Short Distances        Pt was at the [facility] from 2/20/24 - 4/5/24. Pt was discharged with [facility] but they had not been out to see him yet. Pt' requires assistance with ADL's. Pt's wife and daughter just arranged for the pt to have aides from In home referrals for 12 hours a day / 5 days a week.  PT and OT are recommending rehab but family feels he was just at [facility] they would like to take him home with home care and now they have aides for 12 hours a day. I will continue to follow for any Case Management needs.

## 2024-04-08 NOTE — NUTRITION
04/08/24 1357   Biochemical Data,Medical Tests, and Procedures   Biochemical Data/Medical Tests/Procedures Lab values reviewed;Meds reviewed   Labs (Comment) 4/8/2024 hemoglobin 11.1, hematocrit 34.9   Meds (Comment) Vitamin B12, melatonin, Flagyl, milk of magnesium   Nutrition-Focused Physical Exam   Nutrition-Focused Physical Exam Findings RN skin assessment reviewed;No edema documented;No skin issues documented;Subcutaneous fat loss;Muscle Loss;Weight loss   Nutrition-Focused Physical Exam Findings Noted severe orbital adipose loss, severe temporal muscle loss, severe clavicle muscle loss.   Medical-Related Concerns aspiration pneumonia of right lower lobe, vascular dementia, dysphagia, malnutrition, debility   Adequacy of Intake   Nutrition Modality PO   Current PO Intake   Current Diet Order Puréed diet, nectar thick liquids   Nutrition Supplements Ensure Plus High Protein  (Ensure 3 times daily, however not receiving as consistency not compliant with nectar.)   Current Meal Intake %   Intake Supplements 0-25%   Estimated calorie intake compared to estimated need Nutrient needs may be met.   PES Statement   Energy Balance (1) Predicted suboptimal energy intake NI-1.4   Related to Decreased appetite   As evidenced by: Weight loss;NFPE findings   Recommendations/Interventions   Malnutrition/BMI Present Yes   Adult BMI Classifications Underweight < 18.5   360 Statement Malnutrition related to chronic illness as evidenced by >20% body weight loss in 1 year, 10% body weight loss in 6 months, severe orbital adipose loss, severe temporal muscle loss, severe clavicle muscle loss.  To treat with oral diet and nutrition supplements as tolerated.   Summary ST consulted; Low BMI; weight loss, poor PO - MST 2. Presents with lethargy.  Past medical history significant for aspiration pneumonia of right lower lobe, vascular dementia, dysphagia, malnutrition, debility.  Weight history reviewed.  Noted significant 14#  weight loss in 6 months (10% body weight), 35# weight loss in 1 year (21.7% body weight) no edema.  No pressure areas.  Prescribed a purée diet, nectar thick liquids.  Aspiration precautions in place.  Ensure 3 times daily, however not receiving as consistency not compliant with nectar.  Meal completion %. Nutrient needs may be met. Patient sleeping at time of visit.  Noted severe orbital adipose loss, severe temporal muscle loss, severe clavicle muscle loss.  Malnutrition criteria met. Will add Mightyshake TID and Magic Cup BID. RD to follow.   Interventions/Recommendations Continue current diet order;Supplement initiate;Monitor I & O's   Education Assessment   Education Patient/caregiver not appropriate for education at this time   Patient Nutrition Goals   Goal Increase kcal/PRO intake;Meet PO needs;Avoid weight loss

## 2024-04-08 NOTE — ASSESSMENT & PLAN NOTE
Patient presented with fevers and fatigue  Pro ankita elevated  CXR showed RLL PNA  SLP recommend home diet of pureed and nectar thick  Continue rocephin flagyl  PT and OT recommend rehab, but recently completed [facility] rehab  Plan for home once blood cultures neg at 48 hours and tolerating diet

## 2024-04-08 NOTE — PROGRESS NOTES
[facility]  Progress Note  Name: Thomas Chase I  MRN: [id number]  Unit/Bed#: -01 I Date of Admission: 4/6/2024   Date of Service: 4/8/2024 I Hospital Day: 2    Assessment/Plan   Benign prostatic hyperplasia without lower urinary tract symptoms  Assessment & Plan  Continue Flomax    Abnormal urinalysis  Assessment & Plan  Urine did not reflex to culture, low suspicion for UTI    Severe protein-calorie malnutrition (HCC)  Assessment & Plan  Malnutrition Findings:   Adult Malnutrition type: Chronic illness  Adult Degree of Malnutrition: Other severe protein calorie malnutrition  Malnutrition Characteristics: Fat loss, Muscle loss, Weight loss                  360 Statement: Malnutrition related to chronic illness as evidenced by >20% body weight loss in 1 year, 10% body weight loss in 6 months, severe orbital adipose loss, severe temporal muscle loss, severe clavicle muscle loss.  To treat with oral diet and nutrition supplements as tolerated.    BMI Findings:  Adult BMI Classifications: Underweight < 18.5Less than 20        Body mass index is 17.18 kg/m².       Dysphagia  Assessment & Plan  Cont dysphagia diet  Appreciate ST evaluation    Sensorineural hearing loss (SNHL), bilateral  Assessment & Plan  Very hard of hearing.  Utilizes lipreading.  Continue supportive care    Constipation due to neurogenic bowel  Assessment & Plan  Continue bowel regimen    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HCC)  Assessment & Plan  Continue supportive care  Continue zoloft  Add melatonin for insomnia, PRN seroquel 12.5mg bedtime as needed    Hypercholesterolemia  Assessment & Plan  Continue statin    * Aspiration pneumonia of right lower lobe (HCC)  Assessment & Plan  Patient presented with fevers and fatigue  Pro ankita elevated  CXR showed RLL PNA  SLP recommend home diet of pureed and nectar thick  Continue rocephin, flagyl  PT and OT recommend rehab, but recently completed [facility] rehab  Plan for home once blood  cultures neg at 48 hours and tolerating diet           VTE Pharmacologic Prophylaxis:   Pharmacologic: Enoxaparin (Lovenox)  Mechanical VTE Prophylaxis in Place: Yes    Current Length of Stay: 2 day(s)    Current Patient Status: Inpatient   Certification Statement: The patient will continue to require additional inpatient hospital stay due to IV abx, await blood cultures    Discharge Plan: 24-48 hours    Code Status: Level 3 - DNAR and DNI      Subjective:   No events overnight. Seen and examined this morning. Family at bedside with questions answered. Had poor appetite, but was able to work with speech and did well.    Objective:     Vitals:   Temp (24hrs), Av.3 °F (36.3 °C), Min:97.1 °F (36.2 °C), Max:97.5 °F (36.4 °C)    Temp:  [97.1 °F (36.2 °C)-97.5 °F (36.4 °C)] 97.1 °F (36.2 °C)  HR:  [72-85] 72  Resp:  [18-22] 18  BP: (105-131)/(58-80) 114/67  SpO2:  [94 %-98 %] 94 %  Body mass index is 17.18 kg/m².     Input and Output Summary (last 24 hours):       Intake/Output Summary (Last 24 hours) at 2024 1606  Last data filed at 2024 0900  Gross per 24 hour   Intake 480 ml   Output 832 ml   Net -352 ml       Physical Exam:     Physical Exam  Vitals and nursing note reviewed.   Constitutional:       Appearance: He is normal weight.      Comments: Thin, hard of hearing   HENT:      Head: Normocephalic.   Eyes:      Conjunctiva/sclera: Conjunctivae normal.   Cardiovascular:      Rate and Rhythm: Normal rate.   Pulmonary:      Effort: Pulmonary effort is normal.      Breath sounds: No wheezing.   Abdominal:      Palpations: Abdomen is soft.      Tenderness: There is no abdominal tenderness.   Musculoskeletal:         General: No swelling.      Right lower leg: No edema.      Left lower leg: No edema.   Skin:     General: Skin is warm.   Neurological:      Mental Status: He is alert. Mental status is at baseline.         Additional Data:     Labs:    Results from last 7 days   Lab Units 24  0437   WBC  Thousand/uL 5.63   HEMOGLOBIN g/dL 11.1*   HEMATOCRIT % 34.9*   PLATELETS Thousands/uL 346   NEUTROS PCT % 61   LYMPHS PCT % 26   MONOS PCT % 9   EOS PCT % 3     Results from last 7 days   Lab Units 04/08/24  0437 04/07/24  0426   SODIUM mmol/L 141 138   POTASSIUM mmol/L 3.9 3.9   CHLORIDE mmol/L 106 103   CO2 mmol/L 29 28   BUN mg/dL 16 19   CREATININE mg/dL 0.64 0.65   ANION GAP mmol/L 6 7   CALCIUM mg/dL 8.6 8.4   ALBUMIN g/dL  --  3.1*   TOTAL BILIRUBIN mg/dL  --  0.42   ALK PHOS U/L  --  78   ALT U/L  --  25   AST U/L  --  17   GLUCOSE RANDOM mg/dL 82 86     Results from last 7 days   Lab Units 04/06/24  1636   INR  0.96             Results from last 7 days   Lab Units 04/07/24  0426 04/06/24  1636   LACTIC ACID mmol/L  --  1.5   PROCALCITONIN ng/ml 0.64* 0.26*           * I Have Reviewed All Lab Data Listed Above.  * Additional Pertinent Lab Tests Reviewed: All Labs For Current Hospital Admission Reviewed    Mobility:  Basic Mobility Inpatient Raw Score: 17  [facility] Goal: 5: Stand one or more mins  [facility] Achieved: 7: Walk 25 feet or more    Lines:     Invasive Devices       Peripheral Intravenous Line  Duration             Peripheral IV 04/06/24 Left Forearm 1 day                       Imaging:    Imaging Reports Reviewed Today Include:     XR chest 1 view portable    Result Date: 4/8/2024  Impression: Right lower lobe pneumonia. The study was marked in EPIC for significant notification. Workstation performed: [id number]        Recent Cultures (last 7 days):     Results from last 7 days   Lab Units 04/07/24  0120 04/06/24 2035 04/06/24  1636   BLOOD CULTURE   --  No Growth at 24 hrs. No Growth at 24 hrs.   LEGIONELLA URINARY ANTIGEN  Negative  --   --        Last 24 Hours Medication List:   Current Facility-Administered Medications   Medication Dose Route Frequency Provider Last Rate    acetaminophen  650 mg Oral Q6H PRN Tomy Fierro MD      cefTRIAXone  1,000 mg Intravenous Q24H Tomy SOTO  MD Vadim 1,000 mg (04/07/24 1828)    cyanocobalamin  500 mcg Oral Daily Tomy Fierro MD      enoxaparin  40 mg Subcutaneous Daily Tomy Fierro MD      magnesium hydroxide  30 mL Oral Daily PRN Miguel Webber MD      melatonin  6 mg Oral HS Tomy Fierro MD      melatonin  6 mg Oral HS Miguel Webber MD      metroNIDAZOLE  500 mg Intravenous Q8H Kenrick Lui  mg (04/08/24 0900)    midodrine  5 mg Oral TID AC Tomy Fierro MD      polyethylene glycol  17 g Oral Daily PRN Tomy Fierro MD      pravastatin  40 mg Oral Daily With Dinner Tomy Fierro MD      QUEtiapine  12.5 mg Oral HS PRN Miguel Webber MD      sertraline  25 mg Oral Daily Tomy Fierro MD      tamsulosin  0.4 mg Oral Daily With Dinner Tomy Fierro MD          Today, Patient Was Seen By: Miguel Webber MD    ** Please Note: Dictation voice to text software may have been used in the creation of this document. **

## 2024-04-08 NOTE — MALNUTRITION/BMI
This medical record reflects one or more clinical indicators suggestive of malnutrition and/or morbid obesity.    Malnutrition Findings:   Adult Malnutrition type: Chronic illness  Adult Degree of Malnutrition: Other severe protein calorie malnutrition  Malnutrition Characteristics: Fat loss, Muscle loss, Weight loss    360 Statement: Malnutrition related to chronic illness as evidenced by >20% body weight loss in 1 year, 10% body weight loss in 6 months, severe orbital adipose loss, severe temporal muscle loss, severe clavicle muscle loss.  To treat with oral diet and nutrition supplements as tolerated.    BMI Findings:  Adult BMI Classifications: Underweight < 18.5        Body mass index is 17.18 kg/m².     See Nutrition note dated 4/8/2024 in flow sheets for additional details.  Completed nutrition assessment is viewable in the nutrition documentation.

## 2024-04-08 NOTE — PLAN OF CARE
Problem: OCCUPATIONAL THERAPY ADULT  Goal: Performs self-care activities at highest level of function for planned discharge setting.  See evaluation for individualized goals.  Description: Treatment Interventions: ADL retraining, Functional transfer training, UE strengthening/ROM, Endurance training, Cognitive reorientation, Patient/family training, Equipment evaluation/education, Compensatory technique education, Energy conservation, Activityengagement          See flowsheet documentation for full assessment, interventions and recommendations.   Note: Limitation: Decreased ADL status, Decreased UE strength, Decreased endurance, Decreased self-care trans, Decreased high-level ADLs, Decreased Safe judgement during ADL, Decreased cognition  Prognosis: Fair  Assessment: Patient is a 83 y.o. male seen for OT evaluation s/p admit to  [facility] on 4/6/2024 w/Aspiration pneumonia of right lower lobe (HCC) + commorbidities/PMHx (as listed in medical record) affecting patient's functional performance c ADL tasks at time of assessment. OT orders placed for evaluation and treatment to assess pt's ADLs, cognitive status + performance during functional tasks in order to formulate appropriate d/c recommendations.     Therapist performed at least two patient identifiers during session including name and wristband. Personal factors affecting patient at time of initial evaluation include: step(s) to enter environment, multi-level environment, limited home support, advanced age, inability to perform ADLs, inability to ambulate household distances, and decreased initiation and engagement.   Pt's clinical presentation is currently unstable/unpredictable given new onset deficits that effect pt's occupational performance and ability to safely return to PLOF including decrease activity tolerance, decrease standing balance, decrease sitting balance, decrease performance during ADL tasks, decrease cognition, decrease safety  awareness , increase impulsiveness, decrease UB MS, decrease generalized strength, decrease activity engagement, and decrease performance during functional transfers combined with medical complications of abnormal CBC, impulsivity during admission, and need for input for mobility technique/safety. This evaluation required an extensive review of medical and/or therapy records and additional review of physical, cognitive and psychosocial history related to functional performance. Based upon functional performance deficits and assessments, this evaluation has been identified as a  high complexity evaluation.      Patient to benefit from continued Occupational Therapy treatment while in the hospital to address aforementioned deficits and maximize level of functional independence with ADLs and functional mobility. Pt currently requires A to facilitate appropriate d/c plan.     Rehab Resource Intensity Level, OT: II (Moderate Resource Intensity)

## 2024-04-08 NOTE — ASSESSMENT & PLAN NOTE
Continue supportive care  Continue zoloft  Add melatonin for insomnia, PRN seroquel 12.5mg bedtime as needed

## 2024-04-08 NOTE — ASSESSMENT & PLAN NOTE
Continue bowel regimen   Pt BIB[facility]S [facility], reports Bystander called EMS, found Pt. sleeping on someone Parking lot who is about to park. Noted Pt is Alcohol Intoxicated Denies any medical; complaints

## 2024-04-08 NOTE — PLAN OF CARE
Problem: Prexisting or High Potential for Compromised Skin Integrity  Goal: Skin integrity is maintained or improved  Description: INTERVENTIONS:  - Identify patients at risk for skin breakdown  - Assess and monitor skin integrity  - Assess and monitor nutrition and hydration status  - Monitor labs   - Assess for incontinence   - Turn and reposition patient  - Assist with mobility/ambulation  - Relieve pressure over bony prominences  - Avoid friction and shearing  - Provide appropriate hygiene as needed including keeping skin clean and dry  - Evaluate need for skin moisturizer/barrier cream  - Collaborate with interdisciplinary team   - Patient/family teaching  - Consider wound care consult   Outcome: Progressing     Problem: PAIN - ADULT  Goal: Verbalizes/displays adequate comfort level or baseline comfort level  Description: Interventions:  - Encourage patient to monitor pain and request assistance  - Assess pain using appropriate pain scale  - Administer analgesics based on type and severity of pain and evaluate response  - Implement non-pharmacological measures as appropriate and evaluate response  - Consider cultural and social influences on pain and pain management  - Notify physician/advanced practitioner if interventions unsuccessful or patient reports new pain  Outcome: Progressing     Problem: INFECTION - ADULT  Goal: Absence or prevention of progression during hospitalization  Description: INTERVENTIONS:  - Assess and monitor for signs and symptoms of infection  - Monitor lab/diagnostic results  - Monitor all insertion sites, i.e. indwelling lines, tubes, and drains  - Monitor endotracheal if appropriate and nasal secretions for changes in amount and color  - [place] appropriate cooling/warming therapies per order  - Administer medications as ordered  - Instruct and encourage patient and family to use good hand hygiene technique  - Identify and instruct in appropriate isolation precautions for  identified infection/condition  Outcome: Progressing  Goal: Absence of fever/infection during neutropenic period  Description: INTERVENTIONS:  - Monitor WBC    Outcome: Progressing     Problem: SAFETY ADULT  Goal: Patient will remain free of falls  Description: INTERVENTIONS:  - Educate patient/family on patient safety including physical limitations  - Instruct patient to call for assistance with activity   - Consult OT/PT to assist with strengthening/mobility   - Keep Call bell within reach  - Keep bed low and locked with side rails adjusted as appropriate  - Keep care items and personal belongings within reach  - Initiate and maintain comfort rounds  - Make Fall Risk Sign visible to staff  - Offer Toileting every 2 Hours, in advance of need  - Initiate/Maintain bed alarm  - Obtain necessary fall risk management equipment:  socks  - Apply yellow socks and bracelet for high fall risk patients  - Consider moving patient to room near nurses station  Outcome: Progressing  Goal: Maintain or return to baseline ADL function  Description: INTERVENTIONS:  -  Assess patient's ability to carry out ADLs; assess patient's baseline for ADL function and identify physical deficits which impact ability to perform ADLs (bathing, care of mouth/teeth, toileting, grooming, dressing, etc.)  - Assess/evaluate cause of self-care deficits   - Assess range of motion  - Assess patient's mobility; develop plan if impaired  - Assess patient's need for assistive devices and provide as appropriate  - Encourage maximum independence but intervene and supervise when necessary  - Involve family in performance of ADLs  - Assess for home care needs following discharge   - Consider OT consult to assist with ADL evaluation and planning for discharge  - Provide patient education as appropriate  Outcome: Progressing  Goal: Maintains/Returns to pre admission functional level  Description: INTERVENTIONS:  - Perform AM-PAC 6 Click Basic Mobility/ Daily  Activity assessment daily.  - Set and communicate daily mobility goal to care team and patient/family/caregiver.   - Collaborate with rehabilitation services on mobility goals if consulted  - Perform Range of Motion 3 times a day.  - Reposition patient every 2 hours.  - Dangle patient 3 times a day  - Stand patient 3 times a day  - Ambulate patient 3 times a day  - Out of bed to chair 3 times a day   - Out of bed for meals 3 times a day  - Out of bed for toileting  - Record patient progress and toleration of activity level   Outcome: Progressing     Problem: DISCHARGE PLANNING  Goal: Discharge to home or other facility with appropriate resources  Description: INTERVENTIONS:  - Identify barriers to discharge w/patient and caregiver  - Arrange for needed discharge resources and transportation as appropriate  - Identify discharge learning needs (meds, wound care, etc.)  - Arrange for interpretive services to assist at discharge as needed  - Refer to Case Management Department for coordinating discharge planning if the patient needs post-hospital services based on physician/advanced practitioner order or complex needs related to functional status, cognitive ability, or social support system  Outcome: Progressing     Problem: Knowledge Deficit  Goal: Patient/family/caregiver demonstrates understanding of disease process, treatment plan, medications, and discharge instructions  Description: Complete learning assessment and assess knowledge base.  Interventions:  - Provide teaching at level of understanding  - Provide teaching via preferred learning methods  Outcome: Progressing

## 2024-04-08 NOTE — PLAN OF CARE
Problem: INFECTION - ADULT  Goal: Absence or prevention of progression during hospitalization  Description: INTERVENTIONS:  - Assess and monitor for signs and symptoms of infection  - Monitor lab/diagnostic results  - Monitor all insertion sites, i.e. indwelling lines, tubes, and drains  - Monitor endotracheal if appropriate and nasal secretions for changes in amount and color  - [place] appropriate cooling/warming therapies per order  - Administer medications as ordered  - Instruct and encourage patient and family to use good hand hygiene technique  - Identify and instruct in appropriate isolation precautions for identified infection/condition  4/8/2024 0910 by Rishabh Escobar RN  Outcome: Progressing  4/8/2024 0910 by Rishabh Escobar RN  Outcome: Progressing  Goal: Absence of fever/infection during neutropenic period  Description: INTERVENTIONS:  - Monitor WBC    4/8/2024 0910 by Rishabh Escobar RN  Outcome: Progressing  4/8/2024 0910 by Rishabh Escobar RN  Outcome: Progressing     Problem: Prexisting or High Potential for Compromised Skin Integrity  Goal: Skin integrity is maintained or improved  Description: INTERVENTIONS:  - Identify patients at risk for skin breakdown  - Assess and monitor skin integrity  - Assess and monitor nutrition and hydration status  - Monitor labs   - Assess for incontinence   - Turn and reposition patient  - Assist with mobility/ambulation  - Relieve pressure over bony prominences  - Avoid friction and shearing  - Provide appropriate hygiene as needed including keeping skin clean and dry  - Evaluate need for skin moisturizer/barrier cream  - Collaborate with interdisciplinary team   - Patient/family teaching  - Consider wound care consult   4/8/2024 0910 by Rishabh Escobar RN  Outcome: Progressing  4/8/2024 0910 by Rishabh Escobar RN  Outcome: Progressing

## 2024-04-08 NOTE — PLAN OF CARE
Problem: PHYSICAL THERAPY ADULT  Goal: Performs mobility at highest level of function for planned discharge setting.  See evaluation for individualized goals.  Description: Treatment/Interventions: Functional transfer training, Elevations, Therapeutic exercise, Endurance training, Patient/family training, Bed mobility, Gait training, Spoke to nursing, OT          See flowsheet documentation for full assessment, interventions and recommendations.  Note: Prognosis: Good  Problem List: Decreased endurance, Impaired balance, Decreased cognition, Decreased safety awareness, Impaired hearing  Assessment: Pt is 83 y.o. male seen for high-complexity PT evaluation on 4/8/2024 s/p admit to [facility] on 4/6/2024 w/ Aspiration pneumonia of right lower lobe (HCC). PT was consulted to assess pt's functional mobility and d/c needs. Order placed for PT eval and tx, w/ ambulate patient order. PTA, pt was living with his spouse in a multi-story home with first floor set up and 1 HATTIE and required assist for ADLs, IADLs, and prn assist for functional mobility with RW. At time of eval, patient required Brayan for sup > sit, STS, and ambulation 150' with RW and visual and tactile cues for safety awareness. Upon evaluation, pt presenting with impaired functional mobility d/t decreased endurance, impaired balance, decreased cognition, decreased safety awareness, and impaired hearing. Pertinent PMHx and current co-morbidities affecting pt's physical performance at time of assessment include: aspiration pneumonia of right lower lobe, abnormal urinalysis, dysphagia, bilateral sensorineural hearing loss, ischemia vascular dementia. Personal factors affecting pt at time of eval include: ambulating w/ assistive device, stairs to enter home, decreased cognition, positive fall history, and impulsivity. The following objective measures performed on IE also reveal limitations: AM-PAC 6-Clicks: 17/24. Pt's clinical presentation is  currently unstable/unpredictable seen in pt's presentation of advanced age, need for input for task focus and mobility technique, need for minimal assist w/ all phases of mobility when usually mobilizing independently, and ongoing medical assessment. Overall, pt's rehab potential and prognosis to return to PLOF is good as impacted by objective findings, warranting pt to receive further skilled PT interventions to address impairments, activity limitations, and participation restrictions. Pt to benefit from continued PT services to address deficits as defined above and maximize level of functional independent mobility and consistency. From PT/mobility standpoint, recommendation at time of d/c would be level 2, moderate resource intensity in order to facilitate return to PLOF.        Rehab Resource Intensity Level, PT: II (Moderate Resource Intensity)    See flowsheet documentation for full assessment.   Brenda Jimenez; PT, DPT

## 2024-04-09 ENCOUNTER — HOME HEALTH ADMISSION (OUTPATIENT)
Dept: HOME HEALTH SERVICES | Facility: HOME HEALTHCARE | Age: 84
End: 2024-04-09
Payer: MEDICARE

## 2024-04-09 PROCEDURE — 92526 ORAL FUNCTION THERAPY: CPT

## 2024-04-09 PROCEDURE — 99232 SBSQ HOSP IP/OBS MODERATE 35: CPT | Performed by: STUDENT IN AN ORGANIZED HEALTH CARE EDUCATION/TRAINING PROGRAM

## 2024-04-09 RX ORDER — BISACODYL 10 MG
10 SUPPOSITORY, RECTAL RECTAL ONCE
Status: COMPLETED | OUTPATIENT
Start: 2024-04-09 | End: 2024-04-09

## 2024-04-09 RX ORDER — POLYETHYLENE GLYCOL 3350 17 G/17G
17 POWDER, FOR SOLUTION ORAL DAILY
Status: DISCONTINUED | OUTPATIENT
Start: 2024-04-09 | End: 2024-04-10 | Stop reason: HOSPADM

## 2024-04-09 RX ORDER — AMOXICILLIN 250 MG
1 CAPSULE ORAL 2 TIMES DAILY
Status: DISCONTINUED | OUTPATIENT
Start: 2024-04-09 | End: 2024-04-10 | Stop reason: HOSPADM

## 2024-04-09 RX ADMIN — CYANOCOBALAMIN TAB 500 MCG 500 MCG: 500 TAB at 10:40

## 2024-04-09 RX ADMIN — SENNOSIDES AND DOCUSATE SODIUM 1 TABLET: 8.6; 5 TABLET ORAL at 13:58

## 2024-04-09 RX ADMIN — SERTRALINE HYDROCHLORIDE 25 MG: 25 TABLET ORAL at 10:40

## 2024-04-09 RX ADMIN — SENNOSIDES AND DOCUSATE SODIUM 1 TABLET: 8.6; 5 TABLET ORAL at 17:59

## 2024-04-09 RX ADMIN — PRAVASTATIN SODIUM 40 MG: 40 TABLET ORAL at 16:50

## 2024-04-09 RX ADMIN — MAGNESIUM HYDROXIDE 30 ML: 400 SUSPENSION ORAL at 13:14

## 2024-04-09 RX ADMIN — CEFTRIAXONE 1000 MG: 1 INJECTION, SOLUTION INTRAVENOUS at 17:50

## 2024-04-09 RX ADMIN — TAMSULOSIN HYDROCHLORIDE 0.4 MG: 0.4 CAPSULE ORAL at 16:50

## 2024-04-09 RX ADMIN — POLYETHYLENE GLYCOL 3350 17 G: 17 POWDER, FOR SOLUTION ORAL at 13:14

## 2024-04-09 RX ADMIN — MIDODRINE HYDROCHLORIDE 5 MG: 5 TABLET ORAL at 16:50

## 2024-04-09 RX ADMIN — METRONIDAZOLE 500 MG: 500 INJECTION, SOLUTION INTRAVENOUS at 10:40

## 2024-04-09 RX ADMIN — METRONIDAZOLE 500 MG: 500 INJECTION, SOLUTION INTRAVENOUS at 02:51

## 2024-04-09 RX ADMIN — BISACODYL 10 MG: 10 SUPPOSITORY RECTAL at 13:14

## 2024-04-09 RX ADMIN — Medication 6 MG: at 21:15

## 2024-04-09 RX ADMIN — MIDODRINE HYDROCHLORIDE 5 MG: 5 TABLET ORAL at 10:44

## 2024-04-09 RX ADMIN — ENOXAPARIN SODIUM 40 MG: 40 INJECTION SUBCUTANEOUS at 10:40

## 2024-04-09 RX ADMIN — METRONIDAZOLE 500 MG: 500 INJECTION, SOLUTION INTRAVENOUS at 17:00

## 2024-04-09 NOTE — ASSESSMENT & PLAN NOTE
Patient presented with fevers and fatigue  Pro ankita elevated  CXR showed RLL PNA  SLP recommend home diet of pureed and nectar thick  Continue rocephin, flagyl  PT and OT recommend rehab, but recently completed [facility] rehab and family prefers home with home health  Blood cultures neg at 48 hours  Plan for 7 days of abx  Discussed with wife, concern about constipation, will give rectal supp and miralax with senokot

## 2024-04-09 NOTE — PROGRESS NOTES
Critical access hospital  Progress Note  Name: Thomas Chase I  MRN: [id number]  Unit/Bed#: -01 I Date of Admission: 4/6/2024   Date of Service: 4/9/2024 I Hospital Day: 3    Assessment/Plan   Benign prostatic hyperplasia without lower urinary tract symptoms  Assessment & Plan  Continue Flomax    Abnormal urinalysis  Assessment & Plan  Urine did not reflex to culture, low suspicion for UTI  Empirically on rocephin for PNA    Severe protein-calorie malnutrition (HCC)  Assessment & Plan  Malnutrition Findings:   Adult Malnutrition type: Chronic illness  Adult Degree of Malnutrition: Other severe protein calorie malnutrition  Malnutrition Characteristics: Fat loss, Muscle loss, Weight loss       360 Statement: Malnutrition related to chronic illness as evidenced by >20% body weight loss in 1 year, 10% body weight loss in 6 months, severe orbital adipose loss, severe temporal muscle loss, severe clavicle muscle loss.  To treat with oral diet and nutrition supplements as tolerated.    BMI Findings:  Adult BMI Classifications: Underweight < 18.5Less than 20        Body mass index is 17.18 kg/m².       Dysphagia  Assessment & Plan  Cont dysphagia diet  Appreciate ST evaluation    Sensorineural hearing loss (SNHL), bilateral  Assessment & Plan  Very hard of hearing.  Utilizes lipreading.  Continue supportive care    Constipation due to neurogenic bowel  Assessment & Plan  Continue bowel regimen    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HCC)  Assessment & Plan  Continue supportive care  Continue zoloft  Add melatonin for insomnia, PRN seroquel 12.5mg bedtime as needed    Hypercholesterolemia  Assessment & Plan  Continue statin    * Aspiration pneumonia of right lower lobe (HCC)  Assessment & Plan  Patient presented with fevers and fatigue  Pro ankita elevated  CXR showed RLL PNA  SLP recommend home diet of pureed and nectar thick  Continue rocephin, flagyl  PT and OT recommend rehab, but recently completed [facility] and  family prefers home with home health  Blood cultures neg at 48 hours  Plan for 7 days of abx  Discussed with wife, concern about constipation, will give rectal supp and miralax with senokot           VTE Pharmacologic Prophylaxis:   Pharmacologic: Enoxaparin (Lovenox)  Mechanical VTE Prophylaxis in Place: Yes    Current Length of Stay: 3 day(s)    Current Patient Status: Inpatient   Certification Statement: The patient will continue to require additional inpatient hospital stay due to IV abx, bowel movement    Discharge Plan: 24 hours    Code Status: Level 3 - DNAR and DNI    Subjective:   Overnight noted to be agitated, did require dose of seroquel. Was able to sleep at night. Seen while sitting up with wife present this afternoon. Tolerated diet without dificulty. No complaints.    Objective:     Vitals:   Temp (24hrs), Av.9 °F (36.6 °C), Min:97.9 °F (36.6 °C), Max:97.9 °F (36.6 °C)    Temp:  [97.9 °F (36.6 °C)] 97.9 °F (36.6 °C)  HR:  [75] 75  Resp:  [18] 18  BP: (119)/(73) 119/73  SpO2:  [97 %] 97 %  Body mass index is 17.18 kg/m².     Input and Output Summary (last 24 hours):       Intake/Output Summary (Last 24 hours) at 2024 1316  Last data filed at 2024 1911  Gross per 24 hour   Intake 360 ml   Output 300 ml   Net 60 ml       Physical Exam:     Physical Exam  Vitals and nursing note reviewed.   Constitutional:       Appearance: He is normal weight.      Comments: Thin, hard of hearing   HENT:      Head: Normocephalic.   Eyes:      Conjunctiva/sclera: Conjunctivae normal.   Cardiovascular:      Rate and Rhythm: Normal rate.   Pulmonary:      Effort: Pulmonary effort is normal.      Breath sounds: No wheezing.   Abdominal:      Palpations: Abdomen is soft.      Tenderness: There is no abdominal tenderness.   Musculoskeletal:         General: No swelling.      Right lower leg: No edema.      Left lower leg: No edema.   Skin:     General: Skin is warm.   Neurological:      Mental Status: He is alert.  Mental status is at baseline.         Additional Data:     Labs:    Results from last 7 days   Lab Units 04/08/24  0437   WBC Thousand/uL 5.63   HEMOGLOBIN g/dL 11.1*   HEMATOCRIT % 34.9*   PLATELETS Thousands/uL 346   SEGS PCT % 61   LYMPHO PCT % 26   MONO PCT % 9   EOS PCT % 3     Results from last 7 days   Lab Units 04/08/24  0437 04/07/24  0426   SODIUM mmol/L 141 138   POTASSIUM mmol/L 3.9 3.9   CHLORIDE mmol/L 106 103   CO2 mmol/L 29 28   BUN mg/dL 16 19   CREATININE mg/dL 0.64 0.65   ANION GAP mmol/L 6 7   CALCIUM mg/dL 8.6 8.4   ALBUMIN g/dL  --  3.1*   TOTAL BILIRUBIN mg/dL  --  0.42   ALK PHOS U/L  --  78   ALT U/L  --  25   AST U/L  --  17   GLUCOSE RANDOM mg/dL 82 86     Results from last 7 days   Lab Units 04/06/24  1636   INR  0.96             Results from last 7 days   Lab Units 04/07/24  0426 04/06/24  1636   LACTIC ACID mmol/L  --  1.5   PROCALCITONIN ng/ml 0.64* 0.26*           * I Have Reviewed All Lab Data Listed Above.  * Additional Pertinent Lab Tests Reviewed: All Labs For Current Hospital Admission Reviewed    Mobility:  Basic Mobility Inpatient Raw Score: 17  [facility] Goal: 5: Stand one or more mins  [facility] Achieved: 7: Walk 25 feet or more    Lines:     Invasive Devices       Peripheral Intravenous Line  Duration             Peripheral IV 04/06/24 Left Forearm 2 days                       Imaging:    Imaging Reports Reviewed Today Include:     XR chest 1 view portable    Result Date: 4/8/2024  Impression: Right lower lobe pneumonia. The study was marked in EPIC for significant notification. Workstation performed: [id number]        Recent Cultures (last 7 days):     Results from last 7 days   Lab Units 04/07/24  0120 04/06/24 2035 04/06/24  1636   BLOOD CULTURE   --  No Growth at 24 hrs. No Growth at 48 hrs.   LEGIONELLA URINARY ANTIGEN  Negative  --   --        Last 24 Hours Medication List:   Current Facility-Administered Medications   Medication Dose Route Frequency Provider Last Rate     acetaminophen  650 mg Oral Q6H PRN Tomy Fierro MD      cefTRIAXone  1,000 mg Intravenous Q24H Miguel Webber MD 1,000 mg (04/08/24 1742)    cyanocobalamin  500 mcg Oral Daily Tomy Fierro MD      enoxaparin  40 mg Subcutaneous Daily Tomy Fierro MD      magnesium hydroxide  30 mL Oral Daily PRN Miguel Webber MD      melatonin  6 mg Oral HS Miguel Webber MD      metroNIDAZOLE  500 mg Intravenous Q8H Kenrick Lui  mg (04/09/24 1040)    midodrine  5 mg Oral TID AC Tomy Fierro MD      polyethylene glycol  17 g Oral Daily Miguel Webber MD      pravastatin  40 mg Oral Daily With Dinner Tomy Firero MD      QUEtiapine  12.5 mg Oral HS PRN Miguel Webber MD      senna-docusate sodium  1 tablet Oral BID Miguel Webber MD      sertraline  25 mg Oral Daily Tomy Fierro MD      tamsulosin  0.4 mg Oral Daily With Dinner Tomy Fierro MD          Today, Patient Was Seen By: Miguel Webber MD    ** Please Note: Dictation voice to text software may have been used in the creation of this document. **

## 2024-04-09 NOTE — PHYSICAL THERAPY NOTE
04/09/24 1427   PT Last Visit   PT Visit Date 04/09/24   Note Type   Note Type Cancelled Session   Cancel Reasons Refusal                                          Physical Therapy Cancellation Note       PT treatment offered this afternoon but patient declined due to just receiving an enema.  Patient was educated that often times getting up and moving around helps that process along but patient reporting discomfort and continued to decline.  Will continue to offer PT treatment as ordered and progress as able.       Arielle He, PTA

## 2024-04-09 NOTE — SPEECH THERAPY NOTE
Speech Language/Pathology    Speech/Language Pathology Progress Note    Patient Name: Thomas Chase  Today's Date: 4/9/2024     Problem List  Principal Problem:    Aspiration pneumonia of right lower lobe (HCC)  Active Problems:    Hypercholesterolemia    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HCC)    Constipation due to neurogenic bowel    Sensorineural hearing loss (SNHL), bilateral    Dysphagia    Severe protein-calorie malnutrition (HCC)    Abnormal urinalysis    Benign prostatic hyperplasia without lower urinary tract symptoms       Past Medical History  Past Medical History:   Diagnosis Date    Arthritis     Encounter for general adult medical examination without abnormal findings 03/20/2019    Fall 11/03/2022    Hyperlipidemia     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    Macular degeneration, wet (HCC)     Urinary retention 06/02/2022        Past Surgical History  Past Surgical History:   Procedure Laterality Date    BRAIN HEMATOMA EVACUATION Left 05/28/2022    Procedure: left CRANIOTOMY FOR SUBDURAL HEMATOMA;  Surgeon: Chris Watts MD;  Location:  MAIN OR;  Service: Neurosurgery    CARPAL TUNNEL RELEASE      HERNIA REPAIR Right     inguinal    IR CEREBRAL ANGIOGRAPHY / INTERVENTION  06/02/2022    TX COCHLEAR DEVICE IMPLANTATION W/WO MASTOIDECTOMY Left 1/17/2023    Procedure: LEFT COCHLEAR IMPLANTATION WITH MASTOIDECTOMY AND FACIAL NERVE MONITORING WITH AUDIOMETRIC TESTING OF THE IMPLANT;  Surgeon: Susie Tuttle MD;  Location:  MAIN OR;  Service: ENT    TX NEUROPLASTY &/TRANSPOS MEDIAN NRV CARPAL TUNNE Right 09/20/2021    Procedure: RELEASE CARPAL TUNNEL;  Surgeon: Bruno Segovia MD;  Location: [facility];  Service: Orthopedics         Subjective:  Pt was alert and positioned upright OOB in recliner. Wife at bedside.   Objective:  Pt was seen for f/u dysphagia therapy at lunch. Wife fed at slow rate. Pt accepted puree and nectar thick liquids via straw with single sips. Bolus control and  formation were WNL. Transfer and swallow initiation appeared delayed. Occasional cough following po. No other overt s/s of aspiration. Completed 100% of tray.    Assessment:  Pt fed by wife as hx of impulsivity.  Decreased wet vocal quality. Continues with occasional wet cough.     Plan/Recommendations:  Continue dysphagia 1 pureed and nectar thick liquids  Ensure good oral care  ST continue f/u 1-2 times as indicated

## 2024-04-09 NOTE — PLAN OF CARE
Problem: Prexisting or High Potential for Compromised Skin Integrity  Goal: Skin integrity is maintained or improved  Description: INTERVENTIONS:  - Identify patients at risk for skin breakdown  - Assess and monitor skin integrity  - Assess and monitor nutrition and hydration status  - Monitor labs   - Assess for incontinence   - Turn and reposition patient  - Assist with mobility/ambulation  - Relieve pressure over bony prominences  - Avoid friction and shearing  - Provide appropriate hygiene as needed including keeping skin clean and dry  - Evaluate need for skin moisturizer/barrier cream  - Collaborate with interdisciplinary team   - Patient/family teaching  - Consider wound care consult   Outcome: Progressing     Problem: INFECTION - ADULT  Goal: Absence or prevention of progression during hospitalization  Description: INTERVENTIONS:  - Assess and monitor for signs and symptoms of infection  - Monitor lab/diagnostic results  - Monitor all insertion sites, i.e. indwelling lines, tubes, and drains  - Monitor endotracheal if appropriate and nasal secretions for changes in amount and color  - [place] appropriate cooling/warming therapies per order  - Administer medications as ordered  - Instruct and encourage patient and family to use good hand hygiene technique  - Identify and instruct in appropriate isolation precautions for identified infection/condition  Outcome: Progressing

## 2024-04-10 VITALS
HEIGHT: 72 IN | BODY MASS INDEX: 17.15 KG/M2 | SYSTOLIC BLOOD PRESSURE: 114 MMHG | WEIGHT: 126.65 LBS | HEART RATE: 67 BPM | DIASTOLIC BLOOD PRESSURE: 61 MMHG | OXYGEN SATURATION: 95 % | RESPIRATION RATE: 18 BRPM | TEMPERATURE: 98.3 F

## 2024-04-10 PROCEDURE — 97530 THERAPEUTIC ACTIVITIES: CPT

## 2024-04-10 PROCEDURE — 97116 GAIT TRAINING THERAPY: CPT

## 2024-04-10 PROCEDURE — 99239 HOSP IP/OBS DSCHRG MGMT >30: CPT | Performed by: NURSE PRACTITIONER

## 2024-04-10 PROCEDURE — 97110 THERAPEUTIC EXERCISES: CPT

## 2024-04-10 PROCEDURE — 92526 ORAL FUNCTION THERAPY: CPT

## 2024-04-10 RX ORDER — AMOXICILLIN AND CLAVULANATE POTASSIUM 875; 125 MG/1; MG/1
1 TABLET, FILM COATED ORAL EVERY 12 HOURS SCHEDULED
Status: DISCONTINUED | OUTPATIENT
Start: 2024-04-10 | End: 2024-04-10 | Stop reason: HOSPADM

## 2024-04-10 RX ORDER — QUETIAPINE FUMARATE 25 MG/1
12.5 TABLET, FILM COATED ORAL
Qty: 20 TABLET | Refills: 0 | Status: SHIPPED | OUTPATIENT
Start: 2024-04-10

## 2024-04-10 RX ORDER — AMOXICILLIN AND CLAVULANATE POTASSIUM 875; 125 MG/1; MG/1
1 TABLET, FILM COATED ORAL EVERY 12 HOURS SCHEDULED
Qty: 6 TABLET | Refills: 0 | Status: SHIPPED | OUTPATIENT
Start: 2024-04-10 | End: 2024-04-13

## 2024-04-10 RX ADMIN — POLYETHYLENE GLYCOL 3350 17 G: 17 POWDER, FOR SOLUTION ORAL at 08:50

## 2024-04-10 RX ADMIN — MIDODRINE HYDROCHLORIDE 5 MG: 5 TABLET ORAL at 11:59

## 2024-04-10 RX ADMIN — CYANOCOBALAMIN TAB 500 MCG 500 MCG: 500 TAB at 08:50

## 2024-04-10 RX ADMIN — SENNOSIDES AND DOCUSATE SODIUM 1 TABLET: 8.6; 5 TABLET ORAL at 08:49

## 2024-04-10 RX ADMIN — ENOXAPARIN SODIUM 40 MG: 40 INJECTION SUBCUTANEOUS at 08:49

## 2024-04-10 RX ADMIN — SERTRALINE HYDROCHLORIDE 25 MG: 25 TABLET ORAL at 08:50

## 2024-04-10 RX ADMIN — METRONIDAZOLE 500 MG: 500 INJECTION, SOLUTION INTRAVENOUS at 09:05

## 2024-04-10 RX ADMIN — MIDODRINE HYDROCHLORIDE 5 MG: 5 TABLET ORAL at 08:50

## 2024-04-10 RX ADMIN — METRONIDAZOLE 500 MG: 500 INJECTION, SOLUTION INTRAVENOUS at 02:36

## 2024-04-10 NOTE — ASSESSMENT & PLAN NOTE
Patient presented with fevers and fatigue  Procal elevated  CXR showed RLL PNA  SLP recommend home diet of pureed and nectar thick  PT and OT recommend rehab, but recently completed [facility] rehab and family prefers home with home health  Blood cultures NGTD (48 hours)  Received ceftriaxone and Flagyl.  Transition to Augmentin 875-125 and twice daily for 3 more days to complete total of 7-day course of antibiotic  Discussed discharge planning with his wife.

## 2024-04-10 NOTE — DISCHARGE SUMMARY
[facility]  Discharge- Thomas Chase 1940, 83 y.o. male MRN: [id number]  Unit/Bed#: [facility] Encounter: [id number]  Primary Care Provider: RONY Coe   Date and time admitted to hospital: 4/6/2024  4:20 PM    * Aspiration pneumonia of right lower lobe (HCC)  Assessment & Plan  Patient presented with fevers and fatigue  Procal elevated  CXR showed RLL PNA  SLP recommend home diet of pureed and nectar thick  PT and OT recommend rehab, but recently completed [facility] rehab and family prefers home with home health  Blood cultures NGTD (48 hours)  Received ceftriaxone and Flagyl.  Transition to Augmentin 875-125 and twice daily for 3 more days to complete total of 7-day course of antibiotic  Discussed discharge planning with his wife.    Benign prostatic hyperplasia without lower urinary tract symptoms  Assessment & Plan  Continue Flomax    Abnormal urinalysis  Assessment & Plan  Urine did not reflex to culture, low suspicion for UTI  Continue abx for pneumonia     Severe protein-calorie malnutrition (HCC)  Assessment & Plan  Malnutrition Findings:   Adult Malnutrition type: Chronic illness  Adult Degree of Malnutrition: Other severe protein calorie malnutrition  Malnutrition Characteristics: Fat loss, Muscle loss, Weight loss       360 Statement: Malnutrition related to chronic illness as evidenced by >20% body weight loss in 1 year, 10% body weight loss in 6 months, severe orbital adipose loss, severe temporal muscle loss, severe clavicle muscle loss.  To treat with oral diet and nutrition supplements as tolerated.    BMI Findings:  Adult BMI Classifications: Underweight < 18.5Less than 20        Body mass index is 17.18 kg/m².   Diet input appreciated  Supplement as indicated    Dysphagia  Assessment & Plan  Cont dysphagia diet and aspiration precautions   Appreciate ST evaluation      Sensorineural hearing loss (SNHL), bilateral  Assessment & Plan  Very hard of hearing.   Utilizes lipreading.  Continue supportive care    Constipation due to neurogenic bowel  Assessment & Plan  Continue bowel regimen  The patient had BM 4/9    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HCC)  Assessment & Plan  Continue supportive care  Continue zoloft  Added melatonin for insomnia, PRN seroquel 12.5mg bedtime as needed    Hypercholesterolemia  Assessment & Plan  Continue statin        Discharging Physician / Practitioner: RONY Ortiz  PCP: RONY Coe  Admission Date:   Admission Orders (From admission, onward)       Ordered        04/06/24 1824  INPATIENT ADMISSION  Once                          Discharge Date: 04/10/24    Reason for Admission: Fever and fatigue    Discharge Diagnoses:     Principal Problem:    Aspiration pneumonia of right lower lobe (HCC)  Active Problems:    Hypercholesterolemia    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HCC)    Constipation due to neurogenic bowel    Sensorineural hearing loss (SNHL), bilateral    Dysphagia    Severe protein-calorie malnutrition (HCC)    Abnormal urinalysis    Benign prostatic hyperplasia without lower urinary tract symptoms  Resolved Problems:    * No resolved hospital problems. *      Consultations During Hospital Stay:  None    Procedures Performed:     None    Significant Findings / Test Results:     XR chest 1 view portable  Result Date: 4/8/2024  Right lower lobe pneumonia.    Incidental Findings:   None      Test Results Pending at Discharge (will require follow up):   Final blood cultures     Outpatient Tests Requested:  Follow-up with PCP    Complications: None    Hospital Course:     Thomas Chase is a 83 y.o. male patient who originally presented to the hospital on 4/6/2024 due to worsening fatigue and fever.  The patient subsequently was admitted for right lower lobe pneumonia suspected to be due to aspiration.  He was started on ceftriaxone and Flagyl.  The patient was evaluated by speech therapist and at this time recommend to  continue with modified level 3 puréed with nectar thick liquid.  The patient was evaluated by PT/OT and is deemed to be a candidate for short-term rehab however the patient's family would prefer to bring him home as they have help at home take care of the patient.  Overall medically is stable for discharge.  The patient will transition to oral Augmentin for 3 more days to complete total of 7 days of antibiotic.    Condition at Discharge: good     Discharge Day Visit / Exam:     Subjective:  patient was seen and examined. No acute event overnight. He was sitting up in the chair. Wife at bedside. He nodded when asked whether he wanted to go home and smile.   Vitals: Blood Pressure: 114/61 (04/10/24 0717)  Pulse: 67 (04/10/24 0717)  Temperature: 98.3 °F (36.8 °C) (04/09/24 2253)  Temp Source: Temporal (04/09/24 2253)  Respirations: 18 (04/10/24 0717)  Height: 6' (182.9 cm) (04/06/24 1903)  Weight - Scale: 57.5 kg (126 lb 10.5 oz) (04/06/24 1903)  SpO2: 95 % (04/10/24 0717)  Exam:   Physical Exam  Vitals and nursing note reviewed.   Constitutional:       General: He is not in acute distress.     Appearance: Normal appearance.      Comments: Frail and elderly    HENT:      Head: Normocephalic and atraumatic.      Right Ear: External ear normal.      Left Ear: External ear normal.      Nose: Nose normal.      Mouth/Throat:      Mouth: Mucous membranes are moist.      Pharynx: Oropharynx is clear.   Eyes:      General:         Right eye: No discharge.         Left eye: No discharge.      Extraocular Movements: Extraocular movements intact.      Pupils: Pupils are equal, round, and reactive to light.   Cardiovascular:      Rate and Rhythm: Normal rate and regular rhythm.      Pulses: Normal pulses.      Heart sounds: Normal heart sounds. No murmur heard.  Pulmonary:      Effort: Pulmonary effort is normal. No respiratory distress.      Breath sounds: Normal breath sounds. No wheezing or rales.   Abdominal:      General:  Bowel sounds are normal. There is no distension.      Palpations: Abdomen is soft. There is no mass.      Tenderness: There is no abdominal tenderness.   Musculoskeletal:         General: No swelling, tenderness or deformity. Normal range of motion.      Cervical back: Normal range of motion and neck supple. No rigidity.   Skin:     General: Skin is warm and dry.      Capillary Refill: Capillary refill takes less than 2 seconds.      Coloration: Skin is not pale.      Findings: No erythema.   Neurological:      General: No focal deficit present.      Mental Status: He is alert and oriented to person, place, and time. Mental status is at baseline.      Comments: Very hard of hearing      Psychiatric:         Mood and Affect: Mood normal.         Behavior: Behavior normal.         Thought Content: Thought content normal.       Discussion with Family: wife     Discharge instructions/Information to patient and family:   See after visit summary for information provided to patient and family.      Provisions for Follow-Up Care:  See after visit summary for information related to follow-up care and any pertinent home health orders.      Disposition:     Home    Planned Readmission: no      Discharge Statement:  I spent 38 minutes discharging the patient. This time was spent on the day of discharge. I had direct contact with the patient on the day of discharge. Greater than 50% of the total time was spent examining patient, answering all patient questions, arranging and discussing plan of care with patient as well as directly providing post-discharge instructions.  Additional time then spent on discharge activities.    Discharge Medications:  See after visit summary for reconciled discharge medications provided to patient and family.      ** Please Note: This note has been constructed using a voice recognition system **

## 2024-04-10 NOTE — PHYSICAL THERAPY NOTE
PHYSICAL THERAPY TREATMENT NOTE  NAME:  Thomas Chase  DATE: 04/10/24    Length Of Stay: 4  Performed at least 2 patient identifiers during session: Name and ID bracelet    TREATMENT FLOWSHEET:    04/10/24 1115   PT Last Visit   PT Visit Date 04/10/24   Note Type   Note Type Treatment   Pain Assessment   Pain Assessment Tool 0-10   Pain Score No Pain   Restrictions/Precautions   Weight Bearing Precautions Per Order No   Other Precautions Cognitive;Chair Alarm;Bed Alarm;Fall Risk;Aspiration;Hard of hearing;Impulsive   General   Chart Reviewed Yes   Response to Previous Treatment Patient unable to report, no changes reported from family or staff   Family/Caregiver Present No   Cognition   Overall Cognitive Status Impaired   Arousal/Participation Responsive   Attention Difficulty attending to directions   Orientation Level Oriented to person   Memory Decreased long term memory;Decreased recall of biographical information;Decreased short term memory;Decreased recall of recent events;Decreased recall of precautions   Following Commands Unable to follow one step commands   Bed Mobility   Additional Comments Pt. found sitting OOB in recliner upon entering room   Transfers   Sit to Stand   (CG)   Additional items Assist x 1;Increased time required;Verbal cues   Stand to Sit   (CG)   Additional items Assist x 1;Verbal cues;Impulsive   Ambulation/Elevation   Gait pattern Improper Weight shift;Decreased foot clearance;Narrow ILIANA;Foward flexed   Gait Assistance 4  Minimal assist   Additional items Assist x 1;Verbal cues   Assistive Device Rolling walker   Distance 175 ft   Balance   Static Sitting Fair +   Dynamic Sitting Fair   Static Standing Fair -   Dynamic Standing Poor +   Ambulatory Poor +   Endurance Deficit   Endurance Deficit No   Activity Tolerance   Activity Tolerance Patient tolerated treatment well   Nurse Made Aware yes   Exercises   Quad Sets Sitting;15 reps;AROM;Bilateral;AAROM   Heelslides Sitting;15  reps;AROM;Bilateral;AAROM   Hip Flexion Sitting;15 reps;AROM;Bilateral;AAROM   Hip Abduction Sitting;15 reps;AROM;Bilateral;AAROM   Hip Adduction Sitting;15 reps;AROM;Bilateral;AAROM   Knee AROM Long Arc Quad Sitting;15 reps;AROM;Bilateral;AAROM   Ankle Pumps Sitting;15 reps;AROM;Bilateral;AAROM   Marching Sitting;15 reps;AROM;Bilateral;AAROM   Assessment   Prognosis Good   Problem List Decreased endurance;Impaired balance;Decreased cognition;Decreased safety awareness;Impaired hearing   Goals   Patient Goals none verbalized   PT Treatment Day 1   Plan   Treatment/Interventions Functional transfer training;LE strengthening/ROM;Therapeutic exercise;Endurance training;Patient/family training;Equipment eval/education;Gait training;Compensatory technique education;Spoke to nursing   Progress Progressing toward goals   PT Frequency 3-5x/wk   Discharge Recommendation   Rehab Resource Intensity Level, PT II (Moderate Resource Intensity)   AM-PAC Basic Mobility Inpatient   Turning in Flat Bed Without Bedrails 3   Lying on Back to Sitting on Edge of Flat Bed Without Bedrails 3   Moving Bed to Chair 3   Standing Up From Chair Using Arms 3   Walk in Room 3   Climb 3-5 Stairs With Railing 2   Basic Mobility Inpatient Raw Score 17   Basic Mobility Standardized Score 39.67   [facility] Highest Level Of Mobility   -HL Goal 5: Stand one or more mins   -HL Achieved 7: Walk 25 feet or more         The patient's AM-[facility] Basic Mobility Inpatient Short Form Raw Score is 17. A raw score greater than 16 suggests the patient may benefit from discharge to home. Please also refer to the recommendation of the Physical Therapist for safe discharge planning.    Pt seen for PT treatment session this date with interventions consisting of seated TE, transfer training, gait training, and education provided as needed for safety and direction to improve functional mobility, safety awareness, and activity tolerance. Pt agreeable to PT treatment  session upon arrival, pt found sitting OOB in recliner. At end of session, pt left sitting OOB in recliner with BLE elevated, chair alarm activated, and with all needs in reach. In comparison to previous session, pt with improvements in ambulation distance . Continue to recommend Level II (Moderate Resource Intensity at time of d/c in order to maximize pt's functional independence and safety w/ mobility. Pt continues to be functioning below baseline level. PT will continue to see pt while here in order to address the deficits listed above and provide interventions consistent w/ POC in effort to achieve STGs.    Arielle He, PTA

## 2024-04-10 NOTE — ASSESSMENT & PLAN NOTE
Continue supportive care  Continue zoloft  Added melatonin for insomnia, PRN seroquel 12.5mg bedtime as needed

## 2024-04-10 NOTE — ASSESSMENT & PLAN NOTE
Malnutrition Findings:   Adult Malnutrition type: Chronic illness  Adult Degree of Malnutrition: Other severe protein calorie malnutrition  Malnutrition Characteristics: Fat loss, Muscle loss, Weight loss       360 Statement: Malnutrition related to chronic illness as evidenced by >20% body weight loss in 1 year, 10% body weight loss in 6 months, severe orbital adipose loss, severe temporal muscle loss, severe clavicle muscle loss.  To treat with oral diet and nutrition supplements as tolerated.    BMI Findings:  Adult BMI Classifications: Underweight < 18.5Less than 20        Body mass index is 17.18 kg/m².   Diet input appreciated  Supplement as indicated

## 2024-04-10 NOTE — QUICK NOTE
Patient OOB to chair, sitting fully upright. Fed patient full container of applesauce. Patient's wife now at bedside continuing fed per request.

## 2024-04-10 NOTE — PLAN OF CARE
Problem: Prexisting or High Potential for Compromised Skin Integrity  Goal: Skin integrity is maintained or improved  Description: INTERVENTIONS:  - Identify patients at risk for skin breakdown  - Assess and monitor skin integrity  - Assess and monitor nutrition and hydration status  - Monitor labs   - Assess for incontinence   - Turn and reposition patient  - Assist with mobility/ambulation  - Relieve pressure over bony prominences  - Avoid friction and shearing  - Provide appropriate hygiene as needed including keeping skin clean and dry  - Evaluate need for skin moisturizer/barrier cream  - Collaborate with interdisciplinary team   - Patient/family teaching  - Consider wound care consult   Outcome: Progressing     Problem: PAIN - ADULT  Goal: Verbalizes/displays adequate comfort level or baseline comfort level  Description: Interventions:  - Encourage patient to monitor pain and request assistance  - Assess pain using appropriate pain scale  - Administer analgesics based on type and severity of pain and evaluate response  - Implement non-pharmacological measures as appropriate and evaluate response  - Consider cultural and social influences on pain and pain management  - Notify physician/advanced practitioner if interventions unsuccessful or patient reports new pain  Outcome: Progressing     Problem: INFECTION - ADULT  Goal: Absence or prevention of progression during hospitalization  Description: INTERVENTIONS:  - Assess and monitor for signs and symptoms of infection  - Monitor lab/diagnostic results  - Monitor all insertion sites, i.e. indwelling lines, tubes, and drains  - Monitor endotracheal if appropriate and nasal secretions for changes in amount and color  - Garretson appropriate cooling/warming therapies per order  - Administer medications as ordered  - Instruct and encourage patient and family to use good hand hygiene technique  - Identify and instruct in appropriate isolation precautions for  identified infection/condition  Outcome: Progressing     Problem: SAFETY ADULT  Goal: Patient will remain free of falls  Description: INTERVENTIONS:  - Educate patient/family on patient safety including physical limitations  - Instruct patient to call for assistance with activity   - Consult OT/PT to assist with strengthening/mobility   - Keep Call bell within reach  - Keep bed low and locked with side rails adjusted as appropriate  - Keep care items and personal belongings within reach  - Initiate and maintain comfort rounds  - Make Fall Risk Sign visible to staff  - Offer Toileting every 2 Hours, in advance of need  - Initiate/Maintain alarms  - Obtain necessary fall risk management equipment:   - Apply yellow socks and bracelet for high fall risk patients  - Consider moving patient to room near nurses station  Outcome: Progressing  Goal: Maintain or return to baseline ADL function  Description: INTERVENTIONS:  -  Assess patient's ability to carry out ADLs; assess patient's baseline for ADL function and identify physical deficits which impact ability to perform ADLs (bathing, care of mouth/teeth, toileting, grooming, dressing, etc.)  - Assess/evaluate cause of self-care deficits   - Assess range of motion  - Assess patient's mobility; develop plan if impaired  - Assess patient's need for assistive devices and provide as appropriate  - Encourage maximum independence but intervene and supervise when necessary  - Involve family in performance of ADLs  - Assess for home care needs following discharge   - Consider OT consult to assist with ADL evaluation and planning for discharge  - Provide patient education as appropriate  Outcome: Progressing  Goal: Maintains/Returns to pre admission functional level  Description: INTERVENTIONS:  - Perform AM-PAC 6 Click Basic Mobility/ Daily Activity assessment daily.  - Set and communicate daily mobility goal to care team and patient/family/caregiver.   - Collaborate with  rehabilitation services on mobility goals if consulted  - Perform Range of Motion 2 times a day.  - Reposition patient every 2 hours.  - Dangle patient 2 times a day  - Stand patient 2 times a day  - Ambulate patient 2 times a day  - Out of bed to chair 2 times a day   - Out of bed for meals 2 times a day  - Out of bed for toileting  - Record patient progress and toleration of activity level   Outcome: Progressing     Problem: DISCHARGE PLANNING  Goal: Discharge to home or other facility with appropriate resources  Description: INTERVENTIONS:  - Identify barriers to discharge w/patient and caregiver  - Arrange for needed discharge resources and transportation as appropriate  - Identify discharge learning needs (meds, wound care, etc.)  - Refer to Case Management Department for coordinating discharge planning if the patient needs post-hospital services based on physician/advanced practitioner order or complex needs related to functional status, cognitive ability, or social support system  Outcome: Progressing     Problem: Knowledge Deficit  Goal: Patient/family/caregiver demonstrates understanding of disease process, treatment plan, medications, and discharge instructions  Description: Complete learning assessment and assess knowledge base.  Interventions:  - Provide teaching at level of understanding  - Provide teaching via preferred learning methods  Outcome: Progressing     Problem: Nutrition/Hydration-ADULT  Goal: Nutrient/Hydration intake appropriate for improving, restoring or maintaining nutritional needs  Description: Monitor and assess patient's nutrition/hydration status for malnutrition. Collaborate with interdisciplinary team and initiate plan and interventions as ordered.  Monitor patient's weight and dietary intake as ordered or per policy. Utilize nutrition screening tool and intervene as necessary. Determine patient's food preferences and provide high-protein, high-caloric foods as appropriate.      INTERVENTIONS:  - Monitor oral intake, urinary output, labs, and treatment plans  - Assess nutrition and hydration status and recommend course of action  - Evaluate amount of meals eaten  - Assist patient with eating if necessary   - Allow adequate time for meals  - Recommend/ encourage appropriate diets, oral nutritional supplements, and vitamin/mineral supplements  - Order, calculate, and assess calorie counts as needed  - Recommend, monitor, and adjust tube feedings and TPN/PPN based on assessed needs  - Assess need for intravenous fluids  - Provide specific nutrition/hydration education as appropriate  - Include patient/family/caregiver in decisions related to nutrition  Outcome: Progressing

## 2024-04-10 NOTE — QUICK NOTE
Fed patient breakfast. Patient accepted 75% of meal, but does not like the mousse or milk. Fed at slow rate, alternating between liquids and bites. Patient let to rest, sitting fully upright due to aspiration precautions. Will offer OOB once food had settled.

## 2024-04-10 NOTE — SPEECH THERAPY NOTE
Speech Language/Pathology    Speech/Language Pathology Progress Note    Patient Name: Thomas Chase  Today's Date: 4/10/2024     Problem List  Principal Problem:    Aspiration pneumonia of right lower lobe (HCC)  Active Problems:    Hypercholesterolemia    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HCC)    Constipation due to neurogenic bowel    Sensorineural hearing loss (SNHL), bilateral    Dysphagia    Severe protein-calorie malnutrition (HCC)    Abnormal urinalysis    Benign prostatic hyperplasia without lower urinary tract symptoms       Past Medical History  Past Medical History:   Diagnosis Date    Arthritis     Encounter for general adult medical examination without abnormal findings 03/20/2019    Fall 11/03/2022    Hyperlipidemia     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    Macular degeneration, wet (HCC)     Urinary retention 06/02/2022        Past Surgical History  Past Surgical History:   Procedure Laterality Date    BRAIN HEMATOMA EVACUATION Left 05/28/2022    Procedure: left CRANIOTOMY FOR SUBDURAL HEMATOMA;  Surgeon: Chris Watts MD;  Location:  MAIN OR;  Service: Neurosurgery    CARPAL TUNNEL RELEASE      HERNIA REPAIR Right     inguinal    IR CEREBRAL ANGIOGRAPHY / INTERVENTION  06/02/2022    ID COCHLEAR DEVICE IMPLANTATION W/WO MASTOIDECTOMY Left 1/17/2023    Procedure: LEFT COCHLEAR IMPLANTATION WITH MASTOIDECTOMY AND FACIAL NERVE MONITORING WITH AUDIOMETRIC TESTING OF THE IMPLANT;  Surgeon: Susie Tuttle MD;  Location:  MAIN OR;  Service: ENT    ID NEUROPLASTY &/TRANSPOS MEDIAN NRV CARPAL TUNNE Right 09/20/2021    Procedure: RELEASE CARPAL TUNNEL;  Surgeon: Bruno Segovia MD;  Location: [facility];  Service: Orthopedics         Subjective:  Pt was positioned upright and alert.   Objective:  Pt was seen for f/u dysphagia therapy. Positioned upright and alert. Spoke with CNA reported increased coughing with ntl this am. Pt did continue to extended head back ST place pillow and held in  place. ST fed pt as hx of impulsivity. Accepted trials of puree and ntl via straw. Bolus control and formation were WFL. Transfer and Swallow initiation were mildly delayed. X2 cough in which pt extended head back while swallowing. Unable to follow verbal cues to keep head at midline.     Assessment:  Pt full assist for meals as hx of impulsivity. Benefits from assistance to keep head at midline as pt will extend head back. v    Plan/Recommendations:  Continue dysphagia 2 mech soft and ntl  Aspiration precautions  Ensure good oral care  ST continue to f/u as indicated

## 2024-04-10 NOTE — DISCHARGE INSTR - AVS FIRST PAGE
New medication-  Augmentin 875-125 mg twice daily x 3 more days-antibiotic  Seroquel 12.5 mg p.o. as needed at nighttime for sleep

## 2024-04-10 NOTE — PLAN OF CARE
Problem: Prexisting or High Potential for Compromised Skin Integrity  Goal: Skin integrity is maintained or improved  Description: INTERVENTIONS:  - Identify patients at risk for skin breakdown  - Assess and monitor skin integrity  - Assess and monitor nutrition and hydration status  - Monitor labs   - Assess for incontinence   - Turn and reposition patient  - Assist with mobility/ambulation  - Relieve pressure over bony prominences  - Avoid friction and shearing  - Provide appropriate hygiene as needed including keeping skin clean and dry  - Evaluate need for skin moisturizer/barrier cream  - Collaborate with interdisciplinary team   - Patient/family teaching  - Consider wound care consult   Outcome: Progressing     Problem: PAIN - ADULT  Goal: Verbalizes/displays adequate comfort level or baseline comfort level  Description: Interventions:  - Encourage patient to monitor pain and request assistance  - Assess pain using appropriate pain scale  - Administer analgesics based on type and severity of pain and evaluate response  - Implement non-pharmacological measures as appropriate and evaluate response  - Consider cultural and social influences on pain and pain management  - Notify physician/advanced practitioner if interventions unsuccessful or patient reports new pain  Outcome: Progressing     Problem: INFECTION - ADULT  Goal: Absence or prevention of progression during hospitalization  Description: INTERVENTIONS:  - Assess and monitor for signs and symptoms of infection  - Monitor lab/diagnostic results  - Monitor all insertion sites, i.e. indwelling lines, tubes, and drains  - Monitor endotracheal if appropriate and nasal secretions for changes in amount and color  - [place] appropriate cooling/warming therapies per order  - Administer medications as ordered  - Instruct and encourage patient and family to use good hand hygiene technique  - Identify and instruct in appropriate isolation precautions for  identified infection/condition  Outcome: Progressing     Problem: SAFETY ADULT  Goal: Patient will remain free of falls  Description: INTERVENTIONS:  - Educate patient/family on patient safety including physical limitations  - Instruct patient to call for assistance with activity   - Consult OT/PT to assist with strengthening/mobility   - Keep Call bell within reach  - Keep bed low and locked with side rails adjusted as appropriate  - Keep care items and personal belongings within reach  - Initiate and maintain comfort rounds  - Make Fall Risk Sign visible to staff  - Offer Toileting every 2 Hours, in advance of need  - Initiate/Maintain alarms  - Obtain necessary fall risk management equipment:   - Apply yellow socks and bracelet for high fall risk patients  - Consider moving patient to room near nurses station  Outcome: Progressing  Goal: Maintain or return to baseline ADL function  Description: INTERVENTIONS:  -  Assess patient's ability to carry out ADLs; assess patient's baseline for ADL function and identify physical deficits which impact ability to perform ADLs (bathing, care of mouth/teeth, toileting, grooming, dressing, etc.)  - Assess/evaluate cause of self-care deficits   - Assess range of motion  - Assess patient's mobility; develop plan if impaired  - Assess patient's need for assistive devices and provide as appropriate  - Encourage maximum independence but intervene and supervise when necessary  - Involve family in performance of ADLs  - Assess for home care needs following discharge   - Consider OT consult to assist with ADL evaluation and planning for discharge  - Provide patient education as appropriate  Outcome: Progressing  Goal: Maintains/Returns to pre admission functional level  Description: INTERVENTIONS:  - Perform AM-PAC 6 Click Basic Mobility/ Daily Activity assessment daily.  - Set and communicate daily mobility goal to care team and patient/family/caregiver.   - Collaborate with  rehabilitation services on mobility goals if consulted  - Perform Range of Motion 3 times a day.  - Reposition patient every 2 hours.  - Dangle patient 3 times a day  - Stand patient 3 times a day  - Ambulate patient 3 times a day  - Out of bed to chair 3 times a day   - Out of bed for meals 3 times a day  - Out of bed for toileting  - Record patient progress and toleration of activity level   Outcome: Progressing     Problem: DISCHARGE PLANNING  Goal: Discharge to home or other facility with appropriate resources  Description: INTERVENTIONS:  - Identify barriers to discharge w/patient and caregiver  - Arrange for needed discharge resources and transportation as appropriate  - Identify discharge learning needs (meds, wound care, etc.)  - Refer to Case Management Department for coordinating discharge planning if the patient needs post-hospital services based on physician/advanced practitioner order or complex needs related to functional status, cognitive ability, or social support system  Outcome: Progressing     Problem: Knowledge Deficit  Goal: Patient/family/caregiver demonstrates understanding of disease process, treatment plan, medications, and discharge instructions  Description: Complete learning assessment and assess knowledge base.  Interventions:  - Provide teaching at level of understanding  - Provide teaching via preferred learning methods  Outcome: Progressing     Problem: Nutrition/Hydration-ADULT  Goal: Nutrient/Hydration intake appropriate for improving, restoring or maintaining nutritional needs  Description: Monitor and assess patient's nutrition/hydration status for malnutrition. Collaborate with interdisciplinary team and initiate plan and interventions as ordered.  Monitor patient's weight and dietary intake as ordered or per policy. Utilize nutrition screening tool and intervene as necessary. Determine patient's food preferences and provide high-protein, high-caloric foods as appropriate.      INTERVENTIONS:  - Monitor oral intake, urinary output, labs, and treatment plans  - Assess nutrition and hydration status and recommend course of action  - Evaluate amount of meals eaten  - Assist patient with eating if necessary   - Allow adequate time for meals  - Recommend/ encourage appropriate diets, oral nutritional supplements, and vitamin/mineral supplements  - Order, calculate, and assess calorie counts as needed  - Recommend, monitor, and adjust tube feedings and TPN/PPN based on assessed needs  - Assess need for intravenous fluids  - Provide specific nutrition/hydration education as appropriate  - Include patient/family/caregiver in decisions related to nutrition  Outcome: Progressing

## 2024-04-11 ENCOUNTER — HOME CARE VISIT (OUTPATIENT)
Dept: HOME HEALTH SERVICES | Facility: HOME HEALTHCARE | Age: 84
End: 2024-04-11
Payer: MEDICARE

## 2024-04-11 PROCEDURE — 400013 VN SOC

## 2024-04-11 PROCEDURE — G0299 HHS/HOSPICE OF RN EA 15 MIN: HCPCS

## 2024-04-11 PROCEDURE — 10330081 VN NO-PAY CLAIM PROCEDURE

## 2024-04-12 ENCOUNTER — HOME CARE VISIT (OUTPATIENT)
Dept: HOME HEALTH SERVICES | Facility: HOME HEALTHCARE | Age: 84
End: 2024-04-12
Payer: MEDICARE

## 2024-04-12 ENCOUNTER — RA CDI HCC (OUTPATIENT)
Dept: OTHER | Facility: HOSPITAL | Age: 84
End: 2024-04-12

## 2024-04-12 VITALS — SYSTOLIC BLOOD PRESSURE: 106 MMHG | HEART RATE: 83 BPM | OXYGEN SATURATION: 96 % | DIASTOLIC BLOOD PRESSURE: 60 MMHG

## 2024-04-12 VITALS
HEIGHT: 70 IN | OXYGEN SATURATION: 93 % | RESPIRATION RATE: 18 BRPM | BODY MASS INDEX: 17.61 KG/M2 | DIASTOLIC BLOOD PRESSURE: 58 MMHG | HEART RATE: 88 BPM | SYSTOLIC BLOOD PRESSURE: 102 MMHG | WEIGHT: 123 LBS | TEMPERATURE: 98.3 F

## 2024-04-12 LAB
BACTERIA BLD CULT: NORMAL
BACTERIA BLD CULT: NORMAL

## 2024-04-12 PROCEDURE — G0151 HHCP-SERV OF PT,EA 15 MIN: HCPCS

## 2024-04-14 PROCEDURE — 10330064 SPONGE, GAUZE 8PLY N/S 4"X4" (200/PK 20P

## 2024-04-14 PROCEDURE — 10330064 SPONGE, GAUZE 12PLY STR 4"X4" (1/PK 50/B

## 2024-04-14 PROCEDURE — 10330064 DRESSING, XEROFORM STR 5"X9" (50/BX)

## 2024-04-14 PROCEDURE — 10330064 DRESSING, HYDROCELLULAR ADH STR FOAM 4"X

## 2024-04-14 PROCEDURE — 10330064 FOAM, ADH SIL W/BORDER SACRAL 7"X7" (10/

## 2024-04-14 PROCEDURE — 10330064 CLEANSER, WND SEA-CLEANS 6OZ  COLPLT

## 2024-04-14 PROCEDURE — 10330064 TAPE, ADHSV TRANSPORE WHT 2" (6RL/BX 10B

## 2024-04-14 PROCEDURE — 10330064 BANDAGE, CNFRM 4"X4.1YDS N/S LF (12RL/BG

## 2024-04-15 ENCOUNTER — HOME CARE VISIT (OUTPATIENT)
Dept: HOME HEALTH SERVICES | Facility: HOME HEALTHCARE | Age: 84
End: 2024-04-15
Payer: MEDICARE

## 2024-04-15 VITALS
OXYGEN SATURATION: 93 % | DIASTOLIC BLOOD PRESSURE: 54 MMHG | RESPIRATION RATE: 18 BRPM | TEMPERATURE: 98.3 F | HEART RATE: 88 BPM | SYSTOLIC BLOOD PRESSURE: 100 MMHG

## 2024-04-15 PROCEDURE — G0299 HHS/HOSPICE OF RN EA 15 MIN: HCPCS

## 2024-04-15 NOTE — CASE COMMUNICATION
PT initial evaluation completed.  Plan to see [id number], [id number] with focus on improving strength ble, educating pt and family in hep, transfer/gait/stair training.

## 2024-04-16 ENCOUNTER — HOME CARE VISIT (OUTPATIENT)
Dept: HOME HEALTH SERVICES | Facility: HOME HEALTHCARE | Age: 84
End: 2024-04-16
Payer: MEDICARE

## 2024-04-16 ENCOUNTER — TELEPHONE (OUTPATIENT)
Dept: FAMILY MEDICINE CLINIC | Facility: CLINIC | Age: 84
End: 2024-04-16

## 2024-04-16 VITALS — DIASTOLIC BLOOD PRESSURE: 58 MMHG | SYSTOLIC BLOOD PRESSURE: 98 MMHG | OXYGEN SATURATION: 97 % | HEART RATE: 81 BPM

## 2024-04-16 PROCEDURE — G0151 HHCP-SERV OF PT,EA 15 MIN: HCPCS

## 2024-04-16 NOTE — TELEPHONE ENCOUNTER
Patients wife called and said Thomas is feeling much better since he had a bowel movement yesterday.

## 2024-04-16 NOTE — TELEPHONE ENCOUNTER
Demetrice Bingham RN   to RONY Coe       4/15/24  4:28 PM  Patient has not had bowel movement in 5 days.  Patient is currently taking miralax daily and sennakot.  Bowel sounds are hyperactive and no distension or pain noted when palpating abdomen.  They are coming to see you Thursday.  Thank you.      Demetrice Bingham RN  April 16, 2024  RONY Coe   to [facility]       4/16/24 12:07 PM  Please call patient and see is he has had a BM since yesterday. Please see how long he is taking the sennakot and miralax for. Please see if he is having any nausea, vomiting, fevers, diarrhea or abdominal pain. Thanks  Me   to RONY Coe  [facility]       4/16/24 12:38 PM  Left message to call back to discuss  This encounter is not signed. The conversation may still be ongoing.

## 2024-04-17 ENCOUNTER — HOME CARE VISIT (OUTPATIENT)
Dept: HOME HEALTH SERVICES | Facility: HOME HEALTHCARE | Age: 84
End: 2024-04-17
Payer: MEDICARE

## 2024-04-17 VITALS
HEART RATE: 78 BPM | OXYGEN SATURATION: 97 % | TEMPERATURE: 97.6 F | RESPIRATION RATE: 18 BRPM | SYSTOLIC BLOOD PRESSURE: 98 MMHG | DIASTOLIC BLOOD PRESSURE: 62 MMHG

## 2024-04-17 PROCEDURE — G0299 HHS/HOSPICE OF RN EA 15 MIN: HCPCS

## 2024-04-18 ENCOUNTER — APPOINTMENT (OUTPATIENT)
Dept: RADIOLOGY | Facility: CLINIC | Age: 84
End: 2024-04-18
Payer: MEDICARE

## 2024-04-18 ENCOUNTER — OFFICE VISIT (OUTPATIENT)
Dept: FAMILY MEDICINE CLINIC | Facility: CLINIC | Age: 84
End: 2024-04-18
Payer: MEDICARE

## 2024-04-18 ENCOUNTER — APPOINTMENT (OUTPATIENT)
Dept: LAB | Facility: CLINIC | Age: 84
End: 2024-04-18
Payer: MEDICARE

## 2024-04-18 ENCOUNTER — HOME CARE VISIT (OUTPATIENT)
Dept: HOME HEALTH SERVICES | Facility: HOME HEALTHCARE | Age: 84
End: 2024-04-18
Payer: MEDICARE

## 2024-04-18 VITALS
HEIGHT: 70 IN | TEMPERATURE: 95.2 F | BODY MASS INDEX: 18.27 KG/M2 | SYSTOLIC BLOOD PRESSURE: 102 MMHG | OXYGEN SATURATION: 96 % | DIASTOLIC BLOOD PRESSURE: 58 MMHG | WEIGHT: 127.6 LBS | HEART RATE: 87 BPM

## 2024-04-18 DIAGNOSIS — K59.00 CONSTIPATION, UNSPECIFIED CONSTIPATION TYPE: ICD-10-CM

## 2024-04-18 DIAGNOSIS — R45.1 RESTLESSNESS: ICD-10-CM

## 2024-04-18 DIAGNOSIS — E56.9 VITAMIN DEFICIENCY: ICD-10-CM

## 2024-04-18 DIAGNOSIS — J69.0 ASPIRATION PNEUMONIA OF RIGHT LOWER LOBE, UNSPECIFIED ASPIRATION PNEUMONIA TYPE (HCC): ICD-10-CM

## 2024-04-18 DIAGNOSIS — F01.50 ISCHEMIC VASCULAR DEMENTIA (HCC): Primary | ICD-10-CM

## 2024-04-18 DIAGNOSIS — I95.9 HYPOTENSION, UNSPECIFIED HYPOTENSION TYPE: ICD-10-CM

## 2024-04-18 DIAGNOSIS — R33.8 URINARY RETENTION DUE TO BENIGN PROSTATIC HYPERPLASIA: Primary | ICD-10-CM

## 2024-04-18 DIAGNOSIS — I73.9 PERIPHERAL VASCULAR DISEASE, UNSPECIFIED (HCC): ICD-10-CM

## 2024-04-18 DIAGNOSIS — N40.1 URINARY RETENTION DUE TO BENIGN PROSTATIC HYPERPLASIA: Primary | ICD-10-CM

## 2024-04-18 DIAGNOSIS — G47.00 INSOMNIA: ICD-10-CM

## 2024-04-18 DIAGNOSIS — E43 SEVERE PROTEIN-CALORIE MALNUTRITION (HCC): ICD-10-CM

## 2024-04-18 LAB
ALBUMIN SERPL BCP-MCNC: 3.6 G/DL (ref 3.5–5)
ALP SERPL-CCNC: 92 U/L (ref 34–104)
ALT SERPL W P-5'-P-CCNC: 32 U/L (ref 7–52)
ANION GAP SERPL CALCULATED.3IONS-SCNC: 9 MMOL/L (ref 4–13)
AST SERPL W P-5'-P-CCNC: 22 U/L (ref 13–39)
BACTERIA UR QL AUTO: ABNORMAL /HPF
BASOPHILS # BLD AUTO: 0.02 THOUSANDS/ÂΜL (ref 0–0.1)
BASOPHILS NFR BLD AUTO: 0 % (ref 0–1)
BILIRUB SERPL-MCNC: 0.36 MG/DL (ref 0.2–1)
BILIRUB UR QL STRIP: NEGATIVE
BUN SERPL-MCNC: 14 MG/DL (ref 5–25)
CALCIUM SERPL-MCNC: 9 MG/DL (ref 8.4–10.2)
CAOX CRY URNS QL MICRO: ABNORMAL /HPF
CHLORIDE SERPL-SCNC: 100 MMOL/L (ref 96–108)
CLARITY UR: ABNORMAL
CO2 SERPL-SCNC: 29 MMOL/L (ref 21–32)
COLOR UR: YELLOW
CREAT SERPL-MCNC: 0.71 MG/DL (ref 0.6–1.3)
EOSINOPHIL # BLD AUTO: 0.11 THOUSAND/ÂΜL (ref 0–0.61)
EOSINOPHIL NFR BLD AUTO: 2 % (ref 0–6)
ERYTHROCYTE [DISTWIDTH] IN BLOOD BY AUTOMATED COUNT: 14.6 % (ref 11.6–15.1)
GFR SERPL CREATININE-BSD FRML MDRD: 86 ML/MIN/1.73SQ M
GLUCOSE SERPL-MCNC: 89 MG/DL (ref 65–140)
GLUCOSE UR STRIP-MCNC: NEGATIVE MG/DL
HCT VFR BLD AUTO: 36.9 % (ref 36.5–49.3)
HGB BLD-MCNC: 11.8 G/DL (ref 12–17)
HGB UR QL STRIP.AUTO: NEGATIVE
IMM GRANULOCYTES # BLD AUTO: 0.03 THOUSAND/UL (ref 0–0.2)
IMM GRANULOCYTES NFR BLD AUTO: 1 % (ref 0–2)
KETONES UR STRIP-MCNC: NEGATIVE MG/DL
LEUKOCYTE ESTERASE UR QL STRIP: NEGATIVE
LYMPHOCYTES # BLD AUTO: 2.07 THOUSANDS/ÂΜL (ref 0.6–4.47)
LYMPHOCYTES NFR BLD AUTO: 32 % (ref 14–44)
MCH RBC QN AUTO: 31.6 PG (ref 26.8–34.3)
MCHC RBC AUTO-ENTMCNC: 32 G/DL (ref 31.4–37.4)
MCV RBC AUTO: 99 FL (ref 82–98)
MONOCYTES # BLD AUTO: 0.5 THOUSAND/ÂΜL (ref 0.17–1.22)
MONOCYTES NFR BLD AUTO: 8 % (ref 4–12)
MUCOUS THREADS UR QL AUTO: ABNORMAL
NEUTROPHILS # BLD AUTO: 3.7 THOUSANDS/ÂΜL (ref 1.85–7.62)
NEUTS SEG NFR BLD AUTO: 57 % (ref 43–75)
NITRITE UR QL STRIP: NEGATIVE
NON-SQ EPI CELLS URNS QL MICRO: ABNORMAL /HPF
NRBC BLD AUTO-RTO: 0 /100 WBCS
PH UR STRIP.AUTO: 6.5 [PH]
PLATELET # BLD AUTO: 434 THOUSANDS/UL (ref 149–390)
PMV BLD AUTO: 9.8 FL (ref 8.9–12.7)
POTASSIUM SERPL-SCNC: 4.3 MMOL/L (ref 3.5–5.3)
PROT SERPL-MCNC: 7.1 G/DL (ref 6.4–8.4)
PROT UR STRIP-MCNC: ABNORMAL MG/DL
RBC # BLD AUTO: 3.73 MILLION/UL (ref 3.88–5.62)
RBC #/AREA URNS AUTO: ABNORMAL /HPF
SODIUM SERPL-SCNC: 138 MMOL/L (ref 135–147)
SP GR UR STRIP.AUTO: 1.01 (ref 1–1.03)
UROBILINOGEN UR STRIP-ACNC: <2 MG/DL
WBC # BLD AUTO: 6.43 THOUSAND/UL (ref 4.31–10.16)
WBC #/AREA URNS AUTO: ABNORMAL /HPF

## 2024-04-18 PROCEDURE — 80053 COMPREHEN METABOLIC PANEL: CPT

## 2024-04-18 PROCEDURE — 10330064 FOAM, ADH SIL W/BORDER SACRAL 7"X7" (10/

## 2024-04-18 PROCEDURE — 71046 X-RAY EXAM CHEST 2 VIEWS: CPT

## 2024-04-18 PROCEDURE — 74018 RADEX ABDOMEN 1 VIEW: CPT

## 2024-04-18 PROCEDURE — 99214 OFFICE O/P EST MOD 30 MIN: CPT | Performed by: NURSE PRACTITIONER

## 2024-04-18 PROCEDURE — 85025 COMPLETE CBC W/AUTO DIFF WBC: CPT

## 2024-04-18 PROCEDURE — 81001 URINALYSIS AUTO W/SCOPE: CPT

## 2024-04-18 PROCEDURE — G2211 COMPLEX E/M VISIT ADD ON: HCPCS | Performed by: NURSE PRACTITIONER

## 2024-04-18 PROCEDURE — 36415 COLL VENOUS BLD VENIPUNCTURE: CPT

## 2024-04-18 RX ORDER — QUETIAPINE FUMARATE 25 MG/1
12.5 TABLET, FILM COATED ORAL
Qty: 90 TABLET | Refills: 1 | Status: SHIPPED | OUTPATIENT
Start: 2024-04-18

## 2024-04-18 RX ORDER — TAMSULOSIN HYDROCHLORIDE 0.4 MG/1
0.8 CAPSULE ORAL
Qty: 90 CAPSULE | Refills: 3 | Status: SHIPPED | OUTPATIENT
Start: 2024-04-18

## 2024-04-18 NOTE — PROGRESS NOTES
Assessment & Plan     1. 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HCC)    2. Hypotension, unspecified hypotension type  -     CBC and differential; Future; Expected date: 04/19/2024  -     Comprehensive metabolic panel; Future; Expected date: 04/19/2024    3. Severe protein-calorie malnutrition (HCC)    4. Vitamin deficiency    5. Restlessness  -     Urinalysis with microscopic; Future    6. Insomnia  -     QUEtiapine (SEROquel) 25 mg tablet; Take 0.5 tablets (12.5 mg total) by mouth daily at bedtime as needed (insomnia, agitation)    7. Constipation, unspecified constipation type  -     XR abdomen 1 view kub; Future; Expected date: 04/18/2024    8. Peripheral vascular disease, unspecified (HCC)    9. Aspiration pneumonia of right lower lobe, unspecified aspiration pneumonia type (HCC)  -     XR chest pa & lateral; Future; Expected date: 04/18/2024         Subjective     Transitional Care Management Review:   Thomas Chase is a 83 y.o. male here for TCM follow up.     During the TCM phone call patient stated:  TCM Call     Date and time call was made  4/8/2024  7:04 AM    Hospital care reviewed  Records reviewed    Patient was hospitialized at  [facility]    Date of Admission  02/20/24    Date of discharge  04/05/24    Diagnosis  Intracranial hemorrhage    Disposition  Home; Home health services    Were the patients medications reviewed and updated  Yes    Current Symptoms  None      TCM Call     Post hospital issues  None    Should patient be enrolled in anticoag monitoring?  No    Scheduled for follow up?  Yes    Did you obtain your prescribed medications  Yes    Do you need help managing your prescriptions or medications  No    Is transportation to your appointment needed  No    I have advised the patient to call PCP with any new or worsening symptoms  grant cruz        Patient presents for hospital visit following aspiration pneumonia admission on April 6.  Recommended home diet of purée and nectar thick.  PT  and OT rehab was recommended at acute rehab but recently had this so preferred to go home with home health nursing.  Patient receives ceftriaxone and Flagyl and then transition to Augmentin twice daily for 3 more days to complete 7 days of antibiotics.  Low suspicion of UTI at this time.  He had melatonin added for insomnia and as needed Seroquel 12.5 mg at bedtime.  Patient previously had a fall and increased weakness for several days.  He had a CT of the head which showed a 3 mm left frontal subdural hemorrhage he also then had another fall with a small subarachnoid hemorrhage in the left frontal region and new nondisplaced zygomatic fracture and contusion.  He was transferred to [facility].  He did have follow-up with neurology outpatient March 21.  He does have follow-up scheduled with them in 3 months.  He was receiving B12 supplementation.    Since being home, patient had had 24/7 care with both home health nurses, aides, PT. He will have OT/ST services starting. Since he has been home, he has been very restless and has been getting out of the bed or chair every few minutes. He does have a walker at home now. He is never walking on his own as someone is always with him. He does not complain of any pain or discomfort. When asked, he will answer no he is not in pain.  He does times seem like he is agitated but is not having any true behavioral disturbances.  He did have constipation for 5 days and has been taking MiraLAX and Senokot but did have a bowel movement yesterday after given 4 ounces of prune juice.  Patient did have a normal sized bowel movement according to his wife but may still be constipated so we will order abdomen x-ray to ensure there is no major constipation which could be causing discomfort to him.  He does have a cough but is improving from his hospitalization no shortness of breath or fevers.  He does have increased frequency of urination which has been ongoing for quite some time  "but would like a UA to rule out acute infection.  Unable to go in the office today so order given to collect at home.  He does go see Dr. Boyer this afternoon as he has BPH and has been placed on Flomax to help with urinary frequency but usually he was only going 3 times at night and now he is going 7 times at night.  He is not sleeping at night as he is up but is not restless.  He will lay awake in bed most of the night and then during the day most the time he does seem sleepy.  They were not giving the Seroquel consistently as it was written for as needed pain he has had it for the last 2 nights.  Start giving Seroquel 12.5 mg every night to see if this helps.  Have labs completed as instructed along with urine and KUB.  Does have a wound on the right hand from the corner of the dresser. No signs of infection. Nursing is doing his wound care. S/s of when to return to have this re-evaluated reviewed.   He has been eating pureed foods and he is hydrating with thickened liquids but unsure exactly how many ounces- instructed to track ounces to let me know so see if this needs to be increased. BP does continue to run a little low at times but he is taking the midodrine 5 mg tid and it is helping.   - I will also reach out to neurology for their input as they saw him on 3/21, family willing to set up appointment with them if needed. They did say this may be related to change environment again or infection/metabolic cause. Will help with medication adjustments as needed.       Review of Systems   Unable to perform ROS: Dementia       Objective     /58   Pulse 87   Temp (!) 95.2 °F (35.1 °C) (Tympanic)   Ht 5' 10\" (1.778 m)   Wt 57.9 kg (127 lb 9.6 oz)   SpO2 96%   BMI 18.31 kg/m²      Physical Exam  Vitals and nursing note reviewed.   Constitutional:       General: He is not in acute distress.     Appearance: He is well-developed.   HENT:      Head: Normocephalic and atraumatic.   Eyes:      Pupils: Pupils " are equal, round, and reactive to light.   Cardiovascular:      Rate and Rhythm: Normal rate and regular rhythm.      Pulses: Normal pulses.      Heart sounds: Normal heart sounds. No murmur heard.  Pulmonary:      Effort: Pulmonary effort is normal. No respiratory distress.      Breath sounds: Normal breath sounds.   Abdominal:      General: Abdomen is flat. Bowel sounds are normal. There is no distension.      Palpations: Abdomen is soft.      Tenderness: There is no abdominal tenderness. There is no guarding.      Hernia: No hernia is present.   Musculoskeletal:         General: No swelling.   Skin:     General: Skin is warm and dry.      Capillary Refill: Capillary refill takes less than 2 seconds.   Neurological:      Mental Status: He is alert.      Motor: Weakness present.      Gait: Gait abnormal.   Psychiatric:      Comments: Restless, unable to sit still, keeps trying to get out of wheelchair        Medications have been reviewed by provider in current encounter    RONY Coe

## 2024-04-18 NOTE — PATIENT INSTRUCTIONS
-Start to give Seroquel 12.5 mg every night.  -Have labs, urine and KUB completed.  -If cough worsens or shortness of breath or fevers develop, have chest x-ray completed  -Follow-up in 1 week or sooner if needed.  This can be virtual if easier for patient and family.

## 2024-04-19 ENCOUNTER — HOME CARE VISIT (OUTPATIENT)
Dept: HOME HEALTH SERVICES | Facility: HOME HEALTHCARE | Age: 84
End: 2024-04-19
Payer: MEDICARE

## 2024-04-19 VITALS
TEMPERATURE: 98.1 F | OXYGEN SATURATION: 97 % | DIASTOLIC BLOOD PRESSURE: 52 MMHG | HEART RATE: 74 BPM | RESPIRATION RATE: 18 BRPM | SYSTOLIC BLOOD PRESSURE: 94 MMHG

## 2024-04-19 DIAGNOSIS — J18.9 PNEUMONIA OF BOTH LOWER LOBES DUE TO INFECTIOUS ORGANISM: Primary | ICD-10-CM

## 2024-04-19 PROCEDURE — G0151 HHCP-SERV OF PT,EA 15 MIN: HCPCS

## 2024-04-19 PROCEDURE — G0299 HHS/HOSPICE OF RN EA 15 MIN: HCPCS

## 2024-04-19 RX ORDER — LEVOFLOXACIN 750 MG/1
750 TABLET, FILM COATED ORAL EVERY 24 HOURS
Qty: 5 TABLET | Refills: 0 | Status: SHIPPED | OUTPATIENT
Start: 2024-04-19 | End: 2024-04-24

## 2024-04-20 VITALS — SYSTOLIC BLOOD PRESSURE: 114 MMHG | OXYGEN SATURATION: 99 % | HEART RATE: 75 BPM | DIASTOLIC BLOOD PRESSURE: 68 MMHG

## 2024-04-22 ENCOUNTER — HOME CARE VISIT (OUTPATIENT)
Dept: HOME HEALTH SERVICES | Facility: HOME HEALTHCARE | Age: 84
End: 2024-04-22
Payer: MEDICARE

## 2024-04-22 ENCOUNTER — TELEPHONE (OUTPATIENT)
Dept: FAMILY MEDICINE CLINIC | Facility: CLINIC | Age: 84
End: 2024-04-22

## 2024-04-22 VITALS
RESPIRATION RATE: 20 BRPM | DIASTOLIC BLOOD PRESSURE: 56 MMHG | HEART RATE: 62 BPM | TEMPERATURE: 98.7 F | OXYGEN SATURATION: 93 % | SYSTOLIC BLOOD PRESSURE: 128 MMHG

## 2024-04-22 PROCEDURE — G0299 HHS/HOSPICE OF RN EA 15 MIN: HCPCS

## 2024-04-22 NOTE — CASE COMMUNICATION
OT contacted patient to schedule visit for 4/18/24.   Caregiver notes patient has an appointment at the requested time and requested OT reschedule appointment for the week of 4/21/24.   OT will reschedule OT evaluation the week of 4/21/24 at patient / caregivers request.

## 2024-04-22 NOTE — TELEPHONE ENCOUNTER
Spoke with wife this morning. Patient is doing okay at this time. On the Levaquin without side effects. Still is restless but will continue to see how he is doing the next few days. Do have appointment set up with him on wed but may just do this as a phone call if family feels he does not need to come in. He is still restless but no stability issues or falls. Has 24/7 caregivers. Spoke with neurology who is willing to help with medication changes if needed. Will continue to monitor patient and determine next steps at our follow up on wed. S/s of when to go to ER reviewed.   Since Flomax increased by Dr. Boyer to 0.8 mg daily, he had 2 episodes of incontinence. She will call Dr. Boyer to discuss further.

## 2024-04-23 ENCOUNTER — HOME CARE VISIT (OUTPATIENT)
Dept: HOME HEALTH SERVICES | Facility: HOME HEALTHCARE | Age: 84
End: 2024-04-23
Payer: MEDICARE

## 2024-04-23 VITALS — SYSTOLIC BLOOD PRESSURE: 108 MMHG | OXYGEN SATURATION: 99 % | HEART RATE: 80 BPM | DIASTOLIC BLOOD PRESSURE: 68 MMHG

## 2024-04-23 PROCEDURE — G0151 HHCP-SERV OF PT,EA 15 MIN: HCPCS

## 2024-04-24 ENCOUNTER — HOME CARE VISIT (OUTPATIENT)
Dept: HOME HEALTH SERVICES | Facility: HOME HEALTHCARE | Age: 84
End: 2024-04-24
Payer: MEDICARE

## 2024-04-24 ENCOUNTER — TELEPHONE (OUTPATIENT)
Dept: FAMILY MEDICINE CLINIC | Facility: CLINIC | Age: 84
End: 2024-04-24

## 2024-04-24 PROCEDURE — G0152 HHCP-SERV OF OT,EA 15 MIN: HCPCS | Performed by: OCCUPATIONAL THERAPIST

## 2024-04-24 NOTE — TELEPHONE ENCOUNTER
Spoke with patient's wife.  She states he is doing a little bit better today.  She states she did not give the Seroquel every night as she was worried about side effects but did give it last night as he was more agitated than normal.  When she gave this to him, he did sleep for 5-1/2 hours which is the most sleep that he has gone in a while and he has been less restless today.  He continues to take his sertraline 25 mg daily.  No signs of respiratory compromise and is oxygenating well.  Blood pressures have been stable.  He did have incontinence of urine when increased dose of Flomax but she will speak with urology regarding this.  He is having normal bowel movements daily with MiraLAX and Senokot.  Will continue to monitor.  Recommend follow-up in the office in 3 to 4 weeks.  Follow-up sooner as needed.  Will call on Monday to check in and see how patient is doing.

## 2024-04-25 ENCOUNTER — HOME CARE VISIT (OUTPATIENT)
Dept: HOME HEALTH SERVICES | Facility: HOME HEALTHCARE | Age: 84
End: 2024-04-25
Payer: MEDICARE

## 2024-04-25 VITALS
OXYGEN SATURATION: 98 % | RESPIRATION RATE: 18 BRPM | SYSTOLIC BLOOD PRESSURE: 96 MMHG | HEART RATE: 76 BPM | TEMPERATURE: 97.8 F | DIASTOLIC BLOOD PRESSURE: 52 MMHG

## 2024-04-25 VITALS — OXYGEN SATURATION: 92 % | SYSTOLIC BLOOD PRESSURE: 114 MMHG | DIASTOLIC BLOOD PRESSURE: 60 MMHG | HEART RATE: 95 BPM

## 2024-04-25 PROCEDURE — G0299 HHS/HOSPICE OF RN EA 15 MIN: HCPCS

## 2024-04-26 ENCOUNTER — TELEPHONE (OUTPATIENT)
Age: 84
End: 2024-04-26

## 2024-04-26 ENCOUNTER — HOME CARE VISIT (OUTPATIENT)
Dept: HOME HEALTH SERVICES | Facility: HOME HEALTHCARE | Age: 84
End: 2024-04-26
Payer: MEDICARE

## 2024-04-26 VITALS — DIASTOLIC BLOOD PRESSURE: 62 MMHG | SYSTOLIC BLOOD PRESSURE: 104 MMHG | OXYGEN SATURATION: 99 % | HEART RATE: 79 BPM

## 2024-04-26 PROCEDURE — G0151 HHCP-SERV OF PT,EA 15 MIN: HCPCS

## 2024-04-26 NOTE — TELEPHONE ENCOUNTER
Patients wife, Gina, called in to let Jesenia know that Thomas had an episode last night during his sleep. He woke up at 1:30am with the shakes and shivers.  No fever or pain or any other symptoms.  It lasted about a minute and a half and he went back to sleep for the night. He ate breakfast today and was a little unstable but having no signs of the shakes again.  Last night at 9pm he was given the following medications:    9 mg melatonin  8.6 mg senna  20 mg simvastatin  12.5 mg of quetiapine    Gina would a call back on Jesenia's opinion of what could this have been.

## 2024-04-28 NOTE — CASE COMMUNICATION
Pt discharged from  PT as of 4/26/2024 with Spouse/family educated in HEP and encouraged to continue with daily completion.

## 2024-04-29 ENCOUNTER — HOME CARE VISIT (OUTPATIENT)
Dept: HOME HEALTH SERVICES | Facility: HOME HEALTHCARE | Age: 84
End: 2024-04-29
Payer: MEDICARE

## 2024-04-29 VITALS
SYSTOLIC BLOOD PRESSURE: 94 MMHG | RESPIRATION RATE: 18 BRPM | HEART RATE: 92 BPM | DIASTOLIC BLOOD PRESSURE: 54 MMHG | OXYGEN SATURATION: 96 % | TEMPERATURE: 97.8 F

## 2024-04-29 PROCEDURE — G0299 HHS/HOSPICE OF RN EA 15 MIN: HCPCS

## 2024-04-29 NOTE — CASE COMMUNICATION
OT to continue 1 wk 1 the 2wk 2 for caregiver training for  therapeutic exercises, review of safety with transfers/mobility and recommendation/assistance with DME as appropriate.

## 2024-04-30 ENCOUNTER — HOME CARE VISIT (OUTPATIENT)
Dept: HOME HEALTH SERVICES | Facility: HOME HEALTHCARE | Age: 84
End: 2024-04-30
Payer: MEDICARE

## 2024-04-30 PROCEDURE — G0152 HHCP-SERV OF OT,EA 15 MIN: HCPCS | Performed by: OCCUPATIONAL THERAPIST

## 2024-05-01 ENCOUNTER — TELEPHONE (OUTPATIENT)
Dept: FAMILY MEDICINE CLINIC | Facility: CLINIC | Age: 84
End: 2024-05-01

## 2024-05-01 VITALS — OXYGEN SATURATION: 92 % | HEART RATE: 78 BPM | DIASTOLIC BLOOD PRESSURE: 80 MMHG | SYSTOLIC BLOOD PRESSURE: 112 MMHG

## 2024-05-01 NOTE — TELEPHONE ENCOUNTER
Pts wife called back after she missed Katnormyns call earlier. She said Thomas is doing good and feeling good. She said if you would like for her to call back tomorrow that is fine. Thanks

## 2024-05-02 ENCOUNTER — HOME CARE VISIT (OUTPATIENT)
Dept: HOME HEALTH SERVICES | Facility: HOME HEALTHCARE | Age: 84
End: 2024-05-02
Payer: MEDICARE

## 2024-05-02 VITALS
HEART RATE: 78 BPM | SYSTOLIC BLOOD PRESSURE: 94 MMHG | TEMPERATURE: 97 F | OXYGEN SATURATION: 97 % | DIASTOLIC BLOOD PRESSURE: 52 MMHG | RESPIRATION RATE: 16 BRPM

## 2024-05-02 DIAGNOSIS — K59.00 CONSTIPATION: ICD-10-CM

## 2024-05-02 DIAGNOSIS — I95.1 ORTHOSTATIC HYPOTENSION: ICD-10-CM

## 2024-05-02 DIAGNOSIS — E53.8 VITAMIN B12 DEFICIENCY: ICD-10-CM

## 2024-05-02 DIAGNOSIS — R53.81 DEBILITY: ICD-10-CM

## 2024-05-02 DIAGNOSIS — F41.8 DEPRESSION WITH ANXIETY: ICD-10-CM

## 2024-05-02 DIAGNOSIS — M17.12 PRIMARY OSTEOARTHRITIS OF LEFT KNEE: ICD-10-CM

## 2024-05-02 DIAGNOSIS — E78.00 HYPERCHOLESTEROLEMIA: ICD-10-CM

## 2024-05-02 DIAGNOSIS — G47.00 INSOMNIA: ICD-10-CM

## 2024-05-02 PROCEDURE — G0152 HHCP-SERV OF OT,EA 15 MIN: HCPCS | Performed by: OCCUPATIONAL THERAPIST

## 2024-05-02 PROCEDURE — G0299 HHS/HOSPICE OF RN EA 15 MIN: HCPCS

## 2024-05-02 RX ORDER — ACETAMINOPHEN 325 MG/1
650 TABLET ORAL EVERY 6 HOURS PRN
Qty: 90 TABLET | Refills: 2 | Status: SHIPPED | OUTPATIENT
Start: 2024-05-02

## 2024-05-02 RX ORDER — SERTRALINE HYDROCHLORIDE 25 MG/1
25 TABLET, FILM COATED ORAL DAILY
Qty: 90 TABLET | Refills: 2 | Status: SHIPPED | OUTPATIENT
Start: 2024-05-02

## 2024-05-02 RX ORDER — MULTIVITAMIN
1 TABLET ORAL DAILY
Qty: 90 TABLET | Refills: 3 | Status: SHIPPED | OUTPATIENT
Start: 2024-05-02

## 2024-05-02 RX ORDER — SIMVASTATIN 20 MG
20 TABLET ORAL
Qty: 90 TABLET | Refills: 2 | Status: SHIPPED | OUTPATIENT
Start: 2024-05-02

## 2024-05-02 RX ORDER — POLYETHYLENE GLYCOL 3350 17 G/17G
17 POWDER, FOR SOLUTION ORAL DAILY PRN
Qty: 90 EACH | Refills: 2 | Status: SHIPPED | OUTPATIENT
Start: 2024-05-02

## 2024-05-02 RX ORDER — MIDODRINE HYDROCHLORIDE 5 MG/1
5 TABLET ORAL
Qty: 270 TABLET | Refills: 2 | Status: SHIPPED | OUTPATIENT
Start: 2024-05-02

## 2024-05-02 RX ORDER — SENNOSIDES 8.6 MG
8.6 TABLET ORAL
Qty: 90 TABLET | Refills: 2 | Status: SHIPPED | OUTPATIENT
Start: 2024-05-02

## 2024-05-02 RX ORDER — LANOLIN ALCOHOL/MO/W.PET/CERES
9 CREAM (GRAM) TOPICAL
Qty: 90 TABLET | Refills: 3 | Status: SHIPPED | OUTPATIENT
Start: 2024-05-02

## 2024-05-02 NOTE — PROGRESS NOTES
Spoke with patient's wife and she states that he is doing much better.  He is having less urinary incontinence issues.  Did see urology and continues on Flomax 0.8 mg daily.  She is giving the Seroquel at bedtime and it is helping.  Now that he has been home for a little bit longer he is less restless and is doing well.  Follow-up on May 20.

## 2024-05-04 NOTE — CASE COMMUNICATION
OT performed discharge following today's visit with goals met and optimal therapeutic benefit achieved for [facility] OT services. Patient continues to require caregiver assistance for all daily activities and continues to require close 24 hr supervision for safety as patient lack's safety awareness and is at risk for falls.  No further OT services are planned at this time as OT has achieved for  caregiver instruction with upper extremity exer cise program and review of safety with transfers/ mobility.

## 2024-05-06 PROBLEM — J69.0 ASPIRATION PNEUMONIA OF RIGHT LOWER LOBE (HCC): Status: RESOLVED | Noted: 2024-04-06 | Resolved: 2024-05-06

## 2024-05-08 ENCOUNTER — TELEPHONE (OUTPATIENT)
Dept: FAMILY MEDICINE CLINIC | Facility: CLINIC | Age: 84
End: 2024-05-08

## 2024-05-08 NOTE — TELEPHONE ENCOUNTER
Patients wife called the RX Refill Line. Message is being forwarded to the office.     Patients wife wanted to know if he is supposed to continue taking the loratidine 10 mg that was prescribed while in the ED. If he should continue it, they need a new prescription sent. He only has 3 pills left. He would like it sent to [facility] [address] [place] [address] - [id number] [phone]    Please contact patient at [phone]

## 2024-05-09 DIAGNOSIS — T78.40XD ALLERGY, SUBSEQUENT ENCOUNTER: Primary | ICD-10-CM

## 2024-05-09 RX ORDER — LORATADINE 10 MG/1
10 TABLET ORAL DAILY
Qty: 90 TABLET | Refills: 1 | Status: SHIPPED | OUTPATIENT
Start: 2024-05-09

## 2024-05-13 ENCOUNTER — NURSE TRIAGE (OUTPATIENT)
Age: 84
End: 2024-05-13

## 2024-05-13 NOTE — TELEPHONE ENCOUNTER
Pt's wife states that pt has become more restless in the past few weeks. Pt cannot get comfortable and is up and down. Pt's wife states that he is having nml bowel movements, no changes in urine output, denies uti sx's. `She would like to know if he is able to take anything that would calm him down.    She also reports white coating on tongue-pt has hx of thrush. She is unsure if it is that again.

## 2024-05-14 DIAGNOSIS — B37.0 THRUSH: Primary | ICD-10-CM

## 2024-05-14 NOTE — TELEPHONE ENCOUNTER
I spoke with pts wife and advised her to call the neurologist to make an appointment. She said that anytime today is good to call her and discuss pts symptoms. Best phone number to reach her is [phone]

## 2024-05-15 DIAGNOSIS — F41.8 DEPRESSION WITH ANXIETY: ICD-10-CM

## 2024-05-15 RX ORDER — SERTRALINE HYDROCHLORIDE 25 MG/1
37.5 TABLET, FILM COATED ORAL DAILY
Qty: 90 TABLET | Refills: 2 | Status: SHIPPED | OUTPATIENT
Start: 2024-05-15

## 2024-05-15 NOTE — PROGRESS NOTES
Spoke with patient's daughter. He is having longer periods of restlessness/irritation during the day then what I was originally told by his wife. Also, spoke with neurology Dr. May. Will increase Zoloft to 37.5 mg daily for 2 weeks and then increase to 50 mg if tolerating well. Possible s/e reviewed. Also, use Seroquel prn once during the day with understanding this may make him tired. Daughter agreeable to the plan and LM for wife to call.

## 2024-05-20 ENCOUNTER — OFFICE VISIT (OUTPATIENT)
Dept: FAMILY MEDICINE CLINIC | Facility: CLINIC | Age: 84
End: 2024-05-20
Payer: MEDICARE

## 2024-05-20 VITALS
SYSTOLIC BLOOD PRESSURE: 110 MMHG | DIASTOLIC BLOOD PRESSURE: 60 MMHG | TEMPERATURE: 96.3 F | OXYGEN SATURATION: 99 % | BODY MASS INDEX: 18.84 KG/M2 | HEIGHT: 70 IN | HEART RATE: 92 BPM | WEIGHT: 131.6 LBS

## 2024-05-20 DIAGNOSIS — F01.50 ISCHEMIC VASCULAR DEMENTIA (HCC): ICD-10-CM

## 2024-05-20 DIAGNOSIS — I95.9 HYPOTENSION, UNSPECIFIED HYPOTENSION TYPE: Primary | ICD-10-CM

## 2024-05-20 DIAGNOSIS — R45.4 IRRITABILITY: ICD-10-CM

## 2024-05-20 PROCEDURE — 99214 OFFICE O/P EST MOD 30 MIN: CPT | Performed by: NURSE PRACTITIONER

## 2024-05-20 PROCEDURE — G2211 COMPLEX E/M VISIT ADD ON: HCPCS | Performed by: NURSE PRACTITIONER

## 2024-05-20 NOTE — PROGRESS NOTES
"Ambulatory Visit  Name: Thomas Chase      : 1940      MRN: [id number]  Encounter Provider: RONY Coe  Encounter Date: 2024   Encounter department: [facility]    Assessment & Plan   1. Hypotension, unspecified hypotension type  2. 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HCC)  3. Irritability       History of Present Illness     Patient presents for follow up with his wife. She states that he is starting to seem a little more relaxed through out the day now that there are not as many services coming into the home. He was more engaged in the appointment today. He was asking if he would be going to the hospital or home. He does seem worried about going back to the hospital wife states as well. He did try to get up a few times but was able to redirect him. He is eating well. Sleeping well at night but up every 2-3 hours to go to the bathroom. He did increase the sertraline and so far tolerating it okay. Wife stopped giving the Seroquel as she felt like it was making him more irritable and angry at times. She just stopped this last night. He slept the same last night without it. I have been talking with Dr. May so will reach out to him for input going forward.   BP has been stable and doing well with the midodrine. Will check his BP occasionally and keep a log.         Review of Systems   Unable to perform ROS: Patient nonverbal       Objective     /60   Pulse 92   Temp (!) 96.3 °F (35.7 °C) (Tympanic)   Ht 5' 10\" (1.778 m)   Wt 59.7 kg (131 lb 9.6 oz)   SpO2 99%   BMI 18.88 kg/m²     Physical Exam  Vitals and nursing note reviewed.   Constitutional:       General: He is not in acute distress.     Appearance: He is well-developed.   HENT:      Head: Normocephalic and atraumatic.   Cardiovascular:      Rate and Rhythm: Normal rate and regular rhythm.      Pulses: Normal pulses.      Heart sounds: Normal heart sounds. No murmur heard.  Pulmonary:      Effort: " Pulmonary effort is normal. No respiratory distress.      Breath sounds: Normal breath sounds.   Skin:     General: Skin is warm and dry.      Capillary Refill: Capillary refill takes less than 2 seconds.   Neurological:      Mental Status: He is alert.   Psychiatric:         Mood and Affect: Mood normal.         Behavior: Behavior normal.         Thought Content: Thought content normal.         Judgment: Judgment normal.       Administrative Statements

## 2024-05-23 ENCOUNTER — TELEPHONE (OUTPATIENT)
Dept: FAMILY MEDICINE CLINIC | Facility: CLINIC | Age: 84
End: 2024-05-23

## 2024-05-23 NOTE — TELEPHONE ENCOUNTER
Spoke with patient wife and daughter. Restlessness and irritability has been better last few days without Seroquel. Will use ativan prn for agitation but only use when really needed. Possible side effects and fall precautions reviewed.

## 2024-05-30 ENCOUNTER — TELEPHONE (OUTPATIENT)
Dept: FAMILY MEDICINE CLINIC | Facility: CLINIC | Age: 84
End: 2024-05-30

## 2024-05-30 NOTE — TELEPHONE ENCOUNTER
----- Message from RONY Oates sent at 5/29/2024  7:44 PM EDT -----  Please call and see how patient is doing with prn medication thanks  ----- Message -----  From: Chris May MD  Sent: 5/21/2024  11:17 AM EDT  To: RONY Coe    Hi Jesenia,    Kind of a tough call.  Looking back at his full med hx I do not see that he has been on any kind of benzodiazepines, so no prior paradoxical effect but also no track record of success.  It looks like he got a dose or two of olanzapine in the hospital, so that may be an option.   We could also think about something like 100mg capsules of gabapentin, or a trial of a super low dose Benzo.  Of course any sedative/hypnotic can be rough for him and it may take a little work together to find the best treatment option.    Thanks!    -Felipe  ----- Message -----  From: RONY Coe  Sent: 5/20/2024   4:53 PM EDT  To: Chris May MD    Hello Dr. May,      I saw Thomas in the office today with his wife. She states as of last night, she stopped giving the Seroquel as she felt like when he had it, he was getting more anxious and irritable. He did sleep through the night but does still have the period of restlessness. Today, in the office he did seem more comfortable then he was last time I saw him, but he was still trying to get up and leave every couple of minutes. Would you recommend something different to give him as needed for restlessness? Thanks   Jesenia

## 2024-05-30 NOTE — TELEPHONE ENCOUNTER
Pt's wife stated pt has been doing well with the prn medication. She stated pt hasn't needed it much but when he does, it's been helping and working well for him.   absence of nonverbal indicators of pain absence of nonverbal indicators of pain

## 2024-06-07 ENCOUNTER — TELEPHONE (OUTPATIENT)
Age: 84
End: 2024-06-07

## 2024-06-07 NOTE — TELEPHONE ENCOUNTER
Left message with daughter, Allegra Chase, ([phone]) to confirm appointment for 6/10/24 @ 3:00.    Also notice Care Conference was never scheduled- Scheduled for 9/30/24 @ 9:00 due to availability

## 2024-06-26 NOTE — NURSING NOTE
Pt was up frequently through the night able to fall back asleep quickly. Pt bladder scanned at 0430 202 did not lucero to be straight cathed. Pt rang during the night once to use urinal urinated 200 ml. Pt in bed resting call bell in reach respirations even and unlabored. Plan of care ongoing.    show

## 2024-06-28 PROBLEM — R94.31 EKG ABNORMALITIES: Status: RESOLVED | Noted: 2022-06-19 | Resolved: 2024-06-28

## 2024-06-28 PROBLEM — R93.1 ABNORMAL ECHOCARDIOGRAM: Status: RESOLVED | Noted: 2022-06-27 | Resolved: 2024-06-28

## 2024-07-03 ENCOUNTER — LAB REQUISITION (OUTPATIENT)
Dept: LAB | Facility: HOSPITAL | Age: 84
End: 2024-07-03
Payer: MEDICARE

## 2024-07-03 DIAGNOSIS — N39.0 URINARY TRACT INFECTION, SITE NOT SPECIFIED: ICD-10-CM

## 2024-07-03 PROCEDURE — 87086 URINE CULTURE/COLONY COUNT: CPT | Performed by: UROLOGY

## 2024-07-03 PROCEDURE — 87186 SC STD MICRODIL/AGAR DIL: CPT | Performed by: UROLOGY

## 2024-07-03 PROCEDURE — 87077 CULTURE AEROBIC IDENTIFY: CPT | Performed by: UROLOGY

## 2024-07-05 DIAGNOSIS — R45.4 IRRITABILITY: ICD-10-CM

## 2024-07-05 LAB
BACTERIA UR CULT: ABNORMAL
BACTERIA UR CULT: ABNORMAL

## 2024-07-05 NOTE — TELEPHONE ENCOUNTER
Reason for call:   [x] Refill   [] Prior Auth  [] Other:     Office:   [x] PCP/Provider - RONY Coe /[facility]   [] Specialty/Provider -     Medication: LORazepam (Ativan) 0.5 mg tablet     Dose/Frequency: Take 0.5 tablets (0.25 mg total) by mouth every 8 (eight) hours as needed for anxiety     Quantity: 45     Pharmacy:   [place] [id number] [address] [address] [address]        Does the patient have enough for 3 days?   [] Yes   [x] No - Send as HP to POD

## 2024-07-08 RX ORDER — LORAZEPAM 0.5 MG/1
0.25 TABLET ORAL EVERY 8 HOURS PRN
Qty: 45 TABLET | Refills: 0 | Status: ON HOLD | OUTPATIENT
Start: 2024-07-08

## 2024-07-13 ENCOUNTER — APPOINTMENT (EMERGENCY)
Dept: RADIOLOGY | Facility: HOSPITAL | Age: 84
DRG: 177 | End: 2024-07-13
Payer: MEDICARE

## 2024-07-13 ENCOUNTER — HOSPITAL ENCOUNTER (INPATIENT)
Facility: HOSPITAL | Age: 84
LOS: 11 days | DRG: 177 | End: 2024-07-25
Attending: EMERGENCY MEDICINE | Admitting: INTERNAL MEDICINE
Payer: MEDICARE

## 2024-07-13 ENCOUNTER — APPOINTMENT (EMERGENCY)
Dept: CT IMAGING | Facility: HOSPITAL | Age: 84
DRG: 177 | End: 2024-07-13
Payer: MEDICARE

## 2024-07-13 DIAGNOSIS — J18.9 PNEUMONIA: ICD-10-CM

## 2024-07-13 DIAGNOSIS — R13.10 DYSPHAGIA: ICD-10-CM

## 2024-07-13 DIAGNOSIS — J69.0 ASPIRATION PNEUMONIA (HCC): ICD-10-CM

## 2024-07-13 DIAGNOSIS — Z51.5 COMFORT MEASURES ONLY STATUS: Primary | ICD-10-CM

## 2024-07-13 DIAGNOSIS — H91.93 HEARING IMPAIRED PERSON, BILATERAL: ICD-10-CM

## 2024-07-13 DIAGNOSIS — G93.41 ACUTE METABOLIC ENCEPHALOPATHY: ICD-10-CM

## 2024-07-13 LAB
ALBUMIN SERPL BCG-MCNC: 3.6 G/DL (ref 3.5–5)
ALP SERPL-CCNC: 93 U/L (ref 34–104)
ALT SERPL W P-5'-P-CCNC: 20 U/L (ref 7–52)
ANION GAP SERPL CALCULATED.3IONS-SCNC: 6 MMOL/L (ref 4–13)
APTT PPP: 32 SECONDS (ref 23–37)
AST SERPL W P-5'-P-CCNC: 19 U/L (ref 13–39)
BASOPHILS # BLD AUTO: 0.02 THOUSANDS/ÂΜL (ref 0–0.1)
BASOPHILS NFR BLD AUTO: 0 % (ref 0–1)
BILIRUB SERPL-MCNC: 0.29 MG/DL (ref 0.2–1)
BILIRUB UR QL STRIP: NEGATIVE
BUN SERPL-MCNC: 21 MG/DL (ref 5–25)
CALCIUM SERPL-MCNC: 9 MG/DL (ref 8.4–10.2)
CHLORIDE SERPL-SCNC: 104 MMOL/L (ref 96–108)
CLARITY UR: CLEAR
CO2 SERPL-SCNC: 27 MMOL/L (ref 21–32)
COLOR UR: YELLOW
CREAT SERPL-MCNC: 0.82 MG/DL (ref 0.6–1.3)
EOSINOPHIL # BLD AUTO: 0.06 THOUSAND/ÂΜL (ref 0–0.61)
EOSINOPHIL NFR BLD AUTO: 1 % (ref 0–6)
ERYTHROCYTE [DISTWIDTH] IN BLOOD BY AUTOMATED COUNT: 13.7 % (ref 11.6–15.1)
FLUAV RNA RESP QL NAA+PROBE: NEGATIVE
FLUBV RNA RESP QL NAA+PROBE: NEGATIVE
GFR SERPL CREATININE-BSD FRML MDRD: 81 ML/MIN/1.73SQ M
GLUCOSE SERPL-MCNC: 93 MG/DL (ref 65–140)
GLUCOSE UR STRIP-MCNC: NEGATIVE MG/DL
HCT VFR BLD AUTO: 36 % (ref 36.5–49.3)
HGB BLD-MCNC: 11.6 G/DL (ref 12–17)
HGB UR QL STRIP.AUTO: NEGATIVE
IMM GRANULOCYTES # BLD AUTO: 0.03 THOUSAND/UL (ref 0–0.2)
IMM GRANULOCYTES NFR BLD AUTO: 0 % (ref 0–2)
INR PPP: 1.04 (ref 0.84–1.19)
KETONES UR STRIP-MCNC: NEGATIVE MG/DL
LACTATE SERPL-SCNC: 0.7 MMOL/L (ref 0.5–2)
LEUKOCYTE ESTERASE UR QL STRIP: NEGATIVE
LYMPHOCYTES # BLD AUTO: 1.14 THOUSANDS/ÂΜL (ref 0.6–4.47)
LYMPHOCYTES NFR BLD AUTO: 14 % (ref 14–44)
MCH RBC QN AUTO: 30.2 PG (ref 26.8–34.3)
MCHC RBC AUTO-ENTMCNC: 32.2 G/DL (ref 31.4–37.4)
MCV RBC AUTO: 94 FL (ref 82–98)
MONOCYTES # BLD AUTO: 0.7 THOUSAND/ÂΜL (ref 0.17–1.22)
MONOCYTES NFR BLD AUTO: 8 % (ref 4–12)
NEUTROPHILS # BLD AUTO: 6.44 THOUSANDS/ÂΜL (ref 1.85–7.62)
NEUTS SEG NFR BLD AUTO: 77 % (ref 43–75)
NITRITE UR QL STRIP: NEGATIVE
NRBC BLD AUTO-RTO: 0 /100 WBCS
PH UR STRIP.AUTO: 6.5 [PH]
PLATELET # BLD AUTO: 429 THOUSANDS/UL (ref 149–390)
PMV BLD AUTO: 9.2 FL (ref 8.9–12.7)
POTASSIUM SERPL-SCNC: 4.7 MMOL/L (ref 3.5–5.3)
PROCALCITONIN SERPL-MCNC: <0.05 NG/ML
PROT SERPL-MCNC: 7 G/DL (ref 6.4–8.4)
PROT UR STRIP-MCNC: NEGATIVE MG/DL
PROTHROMBIN TIME: 13.8 SECONDS (ref 11.6–14.5)
RBC # BLD AUTO: 3.84 MILLION/UL (ref 3.88–5.62)
RSV RNA RESP QL NAA+PROBE: NEGATIVE
SARS-COV-2 RNA RESP QL NAA+PROBE: NEGATIVE
SODIUM SERPL-SCNC: 137 MMOL/L (ref 135–147)
SP GR UR STRIP.AUTO: 1.02
TSH SERPL DL<=0.05 MIU/L-ACNC: 4.09 UIU/ML (ref 0.45–4.5)
UROBILINOGEN UR QL STRIP.AUTO: 0.2 E.U./DL
WBC # BLD AUTO: 8.39 THOUSAND/UL (ref 4.31–10.16)

## 2024-07-13 PROCEDURE — 70450 CT HEAD/BRAIN W/O DYE: CPT

## 2024-07-13 PROCEDURE — 99285 EMERGENCY DEPT VISIT HI MDM: CPT | Performed by: EMERGENCY MEDICINE

## 2024-07-13 PROCEDURE — 81003 URINALYSIS AUTO W/O SCOPE: CPT | Performed by: EMERGENCY MEDICINE

## 2024-07-13 PROCEDURE — 80053 COMPREHEN METABOLIC PANEL: CPT | Performed by: EMERGENCY MEDICINE

## 2024-07-13 PROCEDURE — 85652 RBC SED RATE AUTOMATED: CPT | Performed by: INTERNAL MEDICINE

## 2024-07-13 PROCEDURE — 96365 THER/PROPH/DIAG IV INF INIT: CPT

## 2024-07-13 PROCEDURE — 85730 THROMBOPLASTIN TIME PARTIAL: CPT | Performed by: EMERGENCY MEDICINE

## 2024-07-13 PROCEDURE — 71045 X-RAY EXAM CHEST 1 VIEW: CPT

## 2024-07-13 PROCEDURE — 87040 BLOOD CULTURE FOR BACTERIA: CPT | Performed by: EMERGENCY MEDICINE

## 2024-07-13 PROCEDURE — 85610 PROTHROMBIN TIME: CPT | Performed by: EMERGENCY MEDICINE

## 2024-07-13 PROCEDURE — 83605 ASSAY OF LACTIC ACID: CPT | Performed by: EMERGENCY MEDICINE

## 2024-07-13 PROCEDURE — 84145 PROCALCITONIN (PCT): CPT | Performed by: EMERGENCY MEDICINE

## 2024-07-13 PROCEDURE — 84443 ASSAY THYROID STIM HORMONE: CPT | Performed by: EMERGENCY MEDICINE

## 2024-07-13 PROCEDURE — 85025 COMPLETE CBC W/AUTO DIFF WBC: CPT | Performed by: EMERGENCY MEDICINE

## 2024-07-13 PROCEDURE — 0241U HB NFCT DS VIR RESP RNA 4 TRGT: CPT | Performed by: EMERGENCY MEDICINE

## 2024-07-13 PROCEDURE — 36415 COLL VENOUS BLD VENIPUNCTURE: CPT | Performed by: EMERGENCY MEDICINE

## 2024-07-13 PROCEDURE — 99285 EMERGENCY DEPT VISIT HI MDM: CPT

## 2024-07-13 PROCEDURE — 86140 C-REACTIVE PROTEIN: CPT | Performed by: INTERNAL MEDICINE

## 2024-07-13 RX ORDER — CEFTRIAXONE 2 G/50ML
2000 INJECTION, SOLUTION INTRAVENOUS ONCE
Status: COMPLETED | OUTPATIENT
Start: 2024-07-13 | End: 2024-07-13

## 2024-07-13 RX ADMIN — CEFTRIAXONE 2000 MG: 2 INJECTION, SOLUTION INTRAVENOUS at 22:41

## 2024-07-13 NOTE — Clinical Note
Case was discussed with licha and the patient's admission status was agreed to be Admission Status: inpatient status to the service of Dr. licha Yoon

## 2024-07-14 PROBLEM — N39.0 UTI (URINARY TRACT INFECTION): Status: ACTIVE | Noted: 2024-07-14

## 2024-07-14 PROBLEM — J69.0 ASPIRATION PNEUMONIA (HCC): Status: ACTIVE | Noted: 2024-07-14

## 2024-07-14 PROBLEM — G93.41 ACUTE METABOLIC ENCEPHALOPATHY: Status: ACTIVE | Noted: 2024-07-14

## 2024-07-14 LAB
ANION GAP SERPL CALCULATED.3IONS-SCNC: 6 MMOL/L (ref 4–13)
BUN SERPL-MCNC: 17 MG/DL (ref 5–25)
CALCIUM SERPL-MCNC: 8.8 MG/DL (ref 8.4–10.2)
CHLORIDE SERPL-SCNC: 104 MMOL/L (ref 96–108)
CO2 SERPL-SCNC: 26 MMOL/L (ref 21–32)
CREAT SERPL-MCNC: 0.77 MG/DL (ref 0.6–1.3)
CRP SERPL QL: 20.9 MG/L
ERYTHROCYTE [DISTWIDTH] IN BLOOD BY AUTOMATED COUNT: 13.6 % (ref 11.6–15.1)
ERYTHROCYTE [SEDIMENTATION RATE] IN BLOOD: 58 MM/HOUR (ref 0–19)
GFR SERPL CREATININE-BSD FRML MDRD: 83 ML/MIN/1.73SQ M
GLUCOSE SERPL-MCNC: 94 MG/DL (ref 65–140)
HCT VFR BLD AUTO: 34.5 % (ref 36.5–49.3)
HGB BLD-MCNC: 11.5 G/DL (ref 12–17)
MAGNESIUM SERPL-MCNC: 2 MG/DL (ref 1.9–2.7)
MCH RBC QN AUTO: 31.4 PG (ref 26.8–34.3)
MCHC RBC AUTO-ENTMCNC: 33.3 G/DL (ref 31.4–37.4)
MCV RBC AUTO: 94 FL (ref 82–98)
PLATELET # BLD AUTO: 415 THOUSANDS/UL (ref 149–390)
PMV BLD AUTO: 9 FL (ref 8.9–12.7)
POTASSIUM SERPL-SCNC: 4.2 MMOL/L (ref 3.5–5.3)
RBC # BLD AUTO: 3.66 MILLION/UL (ref 3.88–5.62)
SODIUM SERPL-SCNC: 136 MMOL/L (ref 135–147)
WBC # BLD AUTO: 6.59 THOUSAND/UL (ref 4.31–10.16)

## 2024-07-14 PROCEDURE — 93005 ELECTROCARDIOGRAM TRACING: CPT

## 2024-07-14 PROCEDURE — 85027 COMPLETE CBC AUTOMATED: CPT | Performed by: INTERNAL MEDICINE

## 2024-07-14 PROCEDURE — 83735 ASSAY OF MAGNESIUM: CPT | Performed by: INTERNAL MEDICINE

## 2024-07-14 PROCEDURE — 99223 1ST HOSP IP/OBS HIGH 75: CPT | Performed by: INTERNAL MEDICINE

## 2024-07-14 PROCEDURE — 80048 BASIC METABOLIC PNL TOTAL CA: CPT | Performed by: INTERNAL MEDICINE

## 2024-07-14 RX ORDER — CEFTRIAXONE 1 G/50ML
1000 INJECTION, SOLUTION INTRAVENOUS EVERY 24 HOURS
Status: DISCONTINUED | OUTPATIENT
Start: 2024-07-14 | End: 2024-07-15

## 2024-07-14 RX ORDER — POLYETHYLENE GLYCOL 3350 17 G/17G
17 POWDER, FOR SOLUTION ORAL DAILY PRN
Status: DISCONTINUED | OUTPATIENT
Start: 2024-07-14 | End: 2024-07-16

## 2024-07-14 RX ORDER — ENOXAPARIN SODIUM 100 MG/ML
40 INJECTION SUBCUTANEOUS DAILY
Status: DISCONTINUED | OUTPATIENT
Start: 2024-07-14 | End: 2024-07-17

## 2024-07-14 RX ORDER — TAMSULOSIN HYDROCHLORIDE 0.4 MG/1
0.8 CAPSULE ORAL
Status: DISCONTINUED | OUTPATIENT
Start: 2024-07-14 | End: 2024-07-16

## 2024-07-14 RX ORDER — SERTRALINE HYDROCHLORIDE 25 MG/1
37.5 TABLET, FILM COATED ORAL DAILY
Status: DISCONTINUED | OUTPATIENT
Start: 2024-07-14 | End: 2024-07-16

## 2024-07-14 RX ORDER — LORAZEPAM 0.5 MG/1
0.25 TABLET ORAL EVERY 8 HOURS PRN
Status: DISCONTINUED | OUTPATIENT
Start: 2024-07-14 | End: 2024-07-16

## 2024-07-14 RX ORDER — MIDODRINE HYDROCHLORIDE 5 MG/1
5 TABLET ORAL
Status: DISCONTINUED | OUTPATIENT
Start: 2024-07-14 | End: 2024-07-16

## 2024-07-14 RX ORDER — LANOLIN ALCOHOL/MO/W.PET/CERES
9 CREAM (GRAM) TOPICAL
Status: DISCONTINUED | OUTPATIENT
Start: 2024-07-14 | End: 2024-07-16

## 2024-07-14 RX ORDER — ACETAMINOPHEN 325 MG/1
650 TABLET ORAL EVERY 6 HOURS PRN
Status: DISCONTINUED | OUTPATIENT
Start: 2024-07-14 | End: 2024-07-16

## 2024-07-14 RX ORDER — SENNOSIDES 8.6 MG
8.6 TABLET ORAL
Status: DISCONTINUED | OUTPATIENT
Start: 2024-07-14 | End: 2024-07-16

## 2024-07-14 RX ORDER — PRAVASTATIN SODIUM 40 MG
40 TABLET ORAL
Status: DISCONTINUED | OUTPATIENT
Start: 2024-07-14 | End: 2024-07-16

## 2024-07-14 RX ORDER — SODIUM CHLORIDE 9 MG/ML
75 INJECTION, SOLUTION INTRAVENOUS CONTINUOUS
Status: DISCONTINUED | OUTPATIENT
Start: 2024-07-14 | End: 2024-07-14

## 2024-07-14 RX ORDER — METRONIDAZOLE 500 MG/100ML
500 INJECTION, SOLUTION INTRAVENOUS EVERY 8 HOURS
Status: DISCONTINUED | OUTPATIENT
Start: 2024-07-14 | End: 2024-07-15

## 2024-07-14 RX ADMIN — Medication 9 MG: at 22:08

## 2024-07-14 RX ADMIN — SENNOSIDES 8.6 MG: 8.6 TABLET, FILM COATED ORAL at 02:31

## 2024-07-14 RX ADMIN — MIDODRINE HYDROCHLORIDE 5 MG: 5 TABLET ORAL at 17:48

## 2024-07-14 RX ADMIN — PRAVASTATIN SODIUM 40 MG: 40 TABLET ORAL at 17:48

## 2024-07-14 RX ADMIN — TAMSULOSIN HYDROCHLORIDE 0.8 MG: 0.4 CAPSULE ORAL at 17:48

## 2024-07-14 RX ADMIN — MIDODRINE HYDROCHLORIDE 5 MG: 5 TABLET ORAL at 06:28

## 2024-07-14 RX ADMIN — Medication 9 MG: at 02:31

## 2024-07-14 RX ADMIN — ACETAMINOPHEN 650 MG: 325 TABLET ORAL at 22:10

## 2024-07-14 RX ADMIN — ENOXAPARIN SODIUM 40 MG: 40 INJECTION SUBCUTANEOUS at 09:37

## 2024-07-14 RX ADMIN — SERTRALINE HYDROCHLORIDE 37.5 MG: 25 TABLET ORAL at 09:37

## 2024-07-14 RX ADMIN — CEFTRIAXONE 1000 MG: 1 INJECTION, SOLUTION INTRAVENOUS at 22:09

## 2024-07-14 RX ADMIN — SENNOSIDES 8.6 MG: 8.6 TABLET, FILM COATED ORAL at 22:09

## 2024-07-14 RX ADMIN — METRONIDAZOLE 500 MG: 500 INJECTION, SOLUTION INTRAVENOUS at 17:49

## 2024-07-14 RX ADMIN — SODIUM CHLORIDE 75 ML/HR: 0.9 INJECTION, SOLUTION INTRAVENOUS at 02:31

## 2024-07-14 RX ADMIN — AZITHROMYCIN MONOHYDRATE 500 MG: 500 INJECTION, POWDER, LYOPHILIZED, FOR SOLUTION INTRAVENOUS at 02:42

## 2024-07-14 RX ADMIN — METRONIDAZOLE 500 MG: 500 INJECTION, SOLUTION INTRAVENOUS at 10:17

## 2024-07-14 RX ADMIN — POLYETHYLENE GLYCOL 3350 17 G: 17 POWDER, FOR SOLUTION ORAL at 14:06

## 2024-07-14 RX ADMIN — MIDODRINE HYDROCHLORIDE 5 MG: 5 TABLET ORAL at 12:33

## 2024-07-14 NOTE — ED PROVIDER NOTES
History  Chief Complaint   Patient presents with    Medical Problem     States he was diagnosed with a UTI about a week ago; increased confusion and anxiety; balance issues also     HPI      83-year-old male, presents to emergency department with family, with a chief complaint of 72 hours of generalized increase weakness, increased confusion with anxiety and balance issues.    Past medical history is of the following: Ischemic vascular dementia, severe protein calorie malnutrition, stents, insomnia, peripheral vascular disease, prior history of aspiration of right lower lobe pneumonia, prior subdural hematoma: February 2024.    Patient's daughter, son-in-law and wife are at the bedside noted that are having increasing challenges taking care of the patient at home who has 24-hour care concerned that they are unable to take care of therefore they were going to bring him to the PCPs office next week to see if they can assist in placement of the patient in a skilled nursing facility no recent falls.  Creased p.o. intake.    Patient does not normally speak is unable to communicate effectively, communicates by writing on the board, is severely hearing impaired    Did have a mild cough, no vomiting, no documented fever, chills, vomiting or diarrhea.  Prior to Admission Medications   Prescriptions Last Dose Informant Patient Reported? Taking?   LORazepam (Ativan) 0.5 mg tablet   No No   Sig: Take 0.5 tablets (0.25 mg total) by mouth every 8 (eight) hours as needed for anxiety   Multiple Vitamin (multivitamin) tablet   No No   Sig: Take 1 tablet by mouth daily   QUEtiapine (SEROquel) 25 mg tablet   No No   Sig: Take 0.5 tablets (12.5 mg total) by mouth daily at bedtime as needed (insomnia, agitation)   Patient not taking: Reported on 5/20/2024   acetaminophen (TYLENOL) 325 mg tablet   No No   Sig: Take 2 tablets (650 mg total) by mouth every 6 (six) hours as needed for mild pain   cyanocobalamin (VITAMIN B-12) 500 MCG tablet    No No   Sig: Take 1 tablet (500 mcg total) by mouth daily   loratadine (CLARITIN) 10 mg tablet   No No   Sig: Take 1 tablet (10 mg total) by mouth daily   melatonin 3 mg   No No   Sig: Take 3 tablets (9 mg total) by mouth daily at bedtime   midodrine (PROAMATINE) 5 mg tablet   No No   Sig: Take 1 tablet (5 mg total) by mouth 3 (three) times a day before meals   nystatin (MYCOSTATIN) 500,000 units/5 mL suspension   No No   Sig: Apply 5 mL (500,000 Units total) to the mouth or throat 4 (four) times a day   polyethylene glycol (MIRALAX) 17 g packet   No No   Sig: Take 17 g by mouth daily as needed (constipation)   senna (SENOKOT) 8.6 mg   No No   Sig: Take 1 tablet (8.6 mg total) by mouth daily at bedtime   sertraline (ZOLOFT) 25 mg tablet   No No   Sig: Take 1.5 tablets (37.5 mg total) by mouth daily   simvastatin (ZOCOR) 20 mg tablet   No No   Sig: Take 1 tablet (20 mg total) by mouth daily at bedtime   tamsulosin (FLOMAX) 0.4 mg   No No   Sig: Take 2 capsules (0.8 mg total) by mouth daily with dinner      Facility-Administered Medications: None       Past Medical History:   Diagnosis Date    Abnormal echocardiogram 06/27/2022    Arthritis     EKG abnormalities 06/19/2022    Encounter for general adult medical examination without abnormal findings 03/20/2019    Fall 11/03/2022    Hearing loss 04/08/2009    Hyperlipidemia     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    Macular degeneration, wet (HCC)     Urinary retention 06/02/2022       Past Surgical History:   Procedure Laterality Date    BRAIN HEMATOMA EVACUATION Left 05/28/2022    Procedure: left CRANIOTOMY FOR SUBDURAL HEMATOMA;  Surgeon: Chris Watts MD;  Location: [facility];  Service: Neurosurgery    CARPAL TUNNEL RELEASE      HERNIA REPAIR Right     inguinal    IR CEREBRAL ANGIOGRAPHY / INTERVENTION  06/02/2022    WY COCHLEAR DEVICE IMPLANTATION W/WO MASTOIDECTOMY Left 1/17/2023    Procedure: LEFT COCHLEAR IMPLANTATION WITH MASTOIDECTOMY AND  FACIAL NERVE MONITORING WITH AUDIOMETRIC TESTING OF THE IMPLANT;  Surgeon: Susie Tuttle MD;  Location:  MAIN OR;  Service: ENT    NM NEUROPLASTY &/TRANSPOS MEDIAN NRV CARPAL TUNNE Right 09/20/2021    Procedure: RELEASE CARPAL TUNNEL;  Surgeon: Bruno Segovia MD;  Location: [facility];  Service: Orthopedics       Family History   Problem Relation Age of Onset    Cancer Mother     Hyperlipidemia Father     Coronary artery disease Father      I have reviewed and agree with the history as documented.    E-Cigarette/Vaping    E-Cigarette Use Never User      E-Cigarette/Vaping Substances    Nicotine No     THC No     CBD No     Flavoring No     Other No     Unknown No      Social History     Tobacco Use    Smoking status: Former    Smokeless tobacco: Never    Tobacco comments:     Smoked as a teenager   Vaping Use    Vaping status: Never Used   Substance Use Topics    Alcohol use: Not Currently     Alcohol/week: 3.0 standard drinks of alcohol     Types: 3 Cans of beer per week    Drug use: No       Review of Systems   Constitutional:  Positive for fatigue. Negative for chills, diaphoresis and fever.   HENT: Negative.     Eyes: Negative.    Respiratory: Negative.  Negative for chest tightness and shortness of breath.    Cardiovascular: Negative.  Negative for chest pain and leg swelling.   Gastrointestinal: Negative.  Negative for abdominal pain and nausea.   Endocrine: Negative.    Genitourinary: Negative.  Negative for difficulty urinating and dysuria.   Skin: Negative.  Negative for color change, rash and wound.   Allergic/Immunologic: Negative.    Neurological: Negative.    Hematological: Negative.    Psychiatric/Behavioral: Negative.         Physical Exam  Physical Exam  Vitals and nursing note reviewed.   Constitutional:       General: He is not in acute distress.     Appearance: Normal appearance. He is normal weight. He is not ill-appearing, toxic-appearing or diaphoretic.   HENT:      Head: Normocephalic.       Right Ear: Tympanic membrane, ear canal and external ear normal.      Left Ear: Tympanic membrane, ear canal and external ear normal.      Nose: Nose normal.      Mouth/Throat:      Mouth: Mucous membranes are moist.      Pharynx: Oropharynx is clear.   Eyes:      Extraocular Movements: Extraocular movements intact.      Conjunctiva/sclera: Conjunctivae normal.      Pupils: Pupils are equal, round, and reactive to light.   Cardiovascular:      Rate and Rhythm: Regular rhythm. Tachycardia present.      Pulses: Normal pulses.      Heart sounds: Normal heart sounds.   Pulmonary:      Effort: Pulmonary effort is normal.      Breath sounds: Normal breath sounds.   Abdominal:      General: Abdomen is flat. Bowel sounds are normal.   Genitourinary:     Penis: Normal.    Musculoskeletal:         General: No swelling, tenderness, deformity or signs of injury. Normal range of motion.      Cervical back: Normal range of motion. No rigidity or tenderness.   Skin:     General: Skin is warm.      Capillary Refill: Capillary refill takes less than 2 seconds.      Coloration: Skin is not jaundiced or pale.      Findings: No bruising, lesion or rash.   Neurological:      General: No focal deficit present.      Mental Status: He is alert.         Vital Signs  ED Triage Vitals   Temperature Pulse Respirations Blood Pressure SpO2   07/13/24 2037 07/13/24 2037 07/13/24 2037 07/13/24 2037 07/13/24 2037   98.5 °F (36.9 °C) 83 18 120/65 94 %      Temp src Heart Rate Source Patient Position - Orthostatic VS BP Location FiO2 (%)   -- 07/13/24 2037 07/13/24 2230 07/13/24 2230 --    Monitor Sitting Right arm       Pain Score       07/13/24 2230       No Pain           Vitals:    07/13/24 2037 07/13/24 2230   BP: 120/65 110/55   Pulse: 83 80   Patient Position - Orthostatic VS:  Sitting         Visual Acuity      ED Medications  Medications   cefTRIAXone (ROCEPHIN) IVPB (premix in dextrose) 2,000 mg 50 mL (0 mg Intravenous Stopped 7/13/24  2331)       Diagnostic Studies  Results Reviewed       Procedure Component Value Units Date/Time    Lactic acid [[id number]]  (Normal) Collected: 07/13/24 2244    Lab Status: Final result Specimen: Blood from Arm, Left Updated: 07/13/24 2309     LACTIC ACID 0.7 mmol/L     Narrative:      Result may be elevated if tourniquet was used during collection.    Procalcitonin [[id number]]  (Normal) Collected: 07/13/24 2120    Lab Status: Final result Specimen: Blood from Arm, Left Updated: 07/13/24 2257     Procalcitonin <0.05 ng/ml     FLU/RSV/COVID - if FLU/RSV clinically relevant [[id number]]  (Normal) Collected: 07/13/24 2206    Lab Status: Final result Specimen: Nares from Nose Updated: 07/13/24 2253     SARS-CoV-2 Negative     INFLUENZA A PCR Negative     INFLUENZA B PCR Negative     RSV PCR Negative    Narrative:      FOR PEDIATRIC PATIENTS - copy/paste COVID Guidelines URL to browser: [email or web address]    SARS-CoV-2 assay is a Nucleic Acid Amplification assay intended for the  qualitative detection of nucleic acid from SARS-CoV-2 in nasopharyngeal  swabs. Results are for the presumptive identification of SARS-CoV-2 RNA.    Positive results are indicative of infection with SARS-CoV-2, the virus  causing COVID-19, but do not rule out bacterial infection or co-infection  with other viruses. Laboratories within the [place] and its  territories are required to report all positive results to the appropriate  public health authorities. Negative results do not preclude SARS-CoV-2  infection and should not be used as the sole basis for treatment or other  patient management decisions. Negative results must be combined with  clinical observations, patient history, and epidemiological information.  This test has not been FDA cleared or approved.    This test has been authorized by FDA under an Emergency Use Authorization  (EUA). This test is only authorized for the  duration of time the  declaration that circumstances exist justifying the authorization of the  emergency use of an in vitro diagnostic tests for detection of SARS-CoV-2  virus and/or diagnosis of COVID-19 infection under section 564(b)(1) of  the Act, 21 U.S.C. 360bbb-3(b)(1), unless the authorization is terminated  or revoked sooner. The test has been validated but independent review by FDA  and CLIA is pending.    Test performed using [employer]: This RT-PCR assay targets N2,  a region unique to SARS-CoV-2. A conserved region in the E-gene was chosen  for pan-Sarbecovirus detection which includes SARS-CoV-2.    According to CMS-2020-01-R, this platform meets the definition of high-throughput technology.    Blood culture #1 [[id number]] Collected: 07/13/24 2244    Lab Status: In process Specimen: Blood from Arm, Left Updated: 07/13/24 2248    Blood culture #2 [[id number]] Collected: 07/13/24 2244    Lab Status: In process Specimen: Blood from Arm, Left Updated: 07/13/24 2248    UA w Reflex to Microscopic w Reflex to Culture [[id number]]  (Normal) Collected: 07/13/24 2207    Lab Status: Final result Specimen: Urine, Clean Catch Updated: 07/13/24 2222     Color, UA Yellow     Clarity, UA Clear     Specific Gravity, UA 1.020     pH, UA 6.5     Leukocytes, UA Negative     Nitrite, UA Negative     Protein, UA Negative mg/dl      Glucose, UA Negative mg/dl      Ketones, UA Negative mg/dl      Urobilinogen, UA 0.2 E.U./dl      Bilirubin, UA Negative     Occult Blood, UA Negative    TSH, 3rd generation with Free T4 reflex [[id number]]  (Normal) Collected: 07/13/24 2120    Lab Status: Final result Specimen: Blood from Arm, Left Updated: 07/13/24 2157     TSH 3RD GENERATON 4.090 uIU/mL     Comprehensive metabolic panel [[id number]] Collected: 07/13/24 2120    Lab Status: Final result Specimen: Blood from Arm, Left Updated: 07/13/24 2145     Sodium 137 mmol/L      Potassium 4.7 mmol/L      Chloride 104 mmol/L      CO2 27  mmol/L      ANION GAP 6 mmol/L      BUN 21 mg/dL      Creatinine 0.82 mg/dL      Glucose 93 mg/dL      Calcium 9.0 mg/dL      AST 19 U/L      ALT 20 U/L      Alkaline Phosphatase 93 U/L      Total Protein 7.0 g/dL      Albumin 3.6 g/dL      Total Bilirubin 0.29 mg/dL      eGFR 81 ml/min/1.73sq m     Narrative:      National Kidney Disease Foundation guidelines for Chronic Kidney Disease (CKD):     Stage 1 with normal or high GFR (GFR > 90 mL/min/1.73 square meters)    Stage 2 Mild CKD (GFR = 60-89 mL/min/1.73 square meters)    Stage 3A Moderate CKD (GFR = 45-59 mL/min/1.73 square meters)    Stage 3B Moderate CKD (GFR = 30-44 mL/min/1.73 square meters)    Stage 4 Severe CKD (GFR = 15-29 mL/min/1.73 square meters)    Stage 5 End Stage CKD (GFR <15 mL/min/1.73 square meters)  Note: GFR calculation is accurate only with a steady state creatinine    Protime-INR [[id number]]  (Normal) Collected: 07/13/24 2120    Lab Status: Final result Specimen: Blood from Arm, Left Updated: 07/13/24 2140     Protime 13.8 seconds      INR 1.04    APTT [[id number]]  (Normal) Collected: 07/13/24 2120    Lab Status: Final result Specimen: Blood from Arm, Left Updated: 07/13/24 2140     PTT 32 seconds     CBC and differential [[id number]]  (Abnormal) Collected: 07/13/24 2120    Lab Status: Final result Specimen: Blood from Arm, Left Updated: 07/13/24 2128     WBC 8.39 Thousand/uL      RBC 3.84 Million/uL      Hemoglobin 11.6 g/dL      Hematocrit 36.0 %      MCV 94 fL      MCH 30.2 pg      MCHC 32.2 g/dL      RDW 13.7 %      MPV 9.2 fL      Platelets 429 Thousands/uL      nRBC 0 /100 WBCs      Segmented % 77 %      Immature Grans % 0 %      Lymphocytes % 14 %      Monocytes % 8 %      Eosinophils Relative 1 %      Basophils Relative 0 %      Absolute Neutrophils 6.44 Thousands/µL      Absolute Immature Grans 0.03 Thousand/uL      Absolute Lymphocytes 1.14 Thousands/µL      Absolute Monocytes 0.70 Thousand/µL      Eosinophils Absolute 0.06  Thousand/µL      Basophils Absolute 0.02 Thousands/µL                    CT head without contrast   Final Result by Radha Alva MD (07/14 0053)      No acute intracranial abnormality.  Chronic microangiopathic changes.                  Workstation performed: [id number]         XR chest 1 view portable   ED Interpretation by Hunter Choi III, DO (07/13 2214)   Right lower lobe pneumonia noted on 1 view chest x-ray.      Final Result by Abbie Cedillo MD (07/13 2318)      Mild right base opacity which could be due to pneumonia.            Workstation performed: [id number]                    Procedures  ECG 12 Lead Documentation Only    Date/Time: 7/14/2024 12:20 AM    Performed by: Hunter Choi III, DO  Authorized by: Hunter Choi III, DO    Indications / Diagnosis:  Change in mental status  ECG reviewed by me, the ED Provider: yes    Patient location:  ED  Comments:      Personally reviewed this EKG that was performed and the patient July 14, 2024, EKG was completed at 12:20 AM, no acute ST abnormalities noted, ventricular rate of 78 bpm, remaining portion of all is within normal limits.    No diffuse elevations to indicate pericarditis.  No coved ST elevations greater than 2mm with negative T waves in V1-3 to indicate concern for brugada.  No biphasic T waves in V2, V3 to indicate Wellens (critical stenosis of LAD).   No elevation in aVR or deviation when compared to V1 (can be associated with ST depression in I,II, V4-6 when left main occlusion is present).            ED Course  ED Course as of 07/14/24 0128   Sat Jul 13, 2024   2151 Patient seen and evaluated, orders placed.  Given a history of vascular dementia, over the last 72 hours patient is gradually gotten worse, very unsteady on his feet as per the family.  Patient has 24-hour care at home but the wife reports that she cannot longer take care of him adequately.    Brief focused differential dx in this patient is as  "follows: TIA versus electrolyte abnormality versus thyroid dysfunction versus CNS bleed with prior history of this, Flu RSV COVID versus pneumonia.   Sun Jul 14, 2024   0120 Reviewed CT imaging with the patient, members at the bedside.  CT imaging of the head stable.  Admit to the hospital service.                                 SBIRT 20yo+      Flowsheet Row Most Recent Value   Initial Alcohol Screen: US AUDIT-C     1. How often do you have a drink containing alcohol? 0 Filed at: 07/13/2024 2140   2. How many drinks containing alcohol do you have on a typical day you are drinking?  0 Filed at: 07/13/2024 2140   3a. Male UNDER 65: How often do you have five or more drinks on one occasion? 0 Filed at: 07/13/2024 2140   3b. FEMALE Any Age, or MALE 65+: How often do you have 4 or more drinks on one occassion? 0 Filed at: 07/13/2024 2140   Audit-C Score 0 Filed at: 07/13/2024 2140   KULWANT: How many times in the past year have you...    Used an illegal drug or used a prescription medication for non-medical reasons? Never Filed at: 07/13/2024 2140                      Medical Decision Making  Pneumonia possibly consistent with aspiration, patient is on a puréed diet, family is concerned that they cannot take care of him at home, despite having 24-hour care.  Patient is DNR/DNI, this was reviewed with the family at the bedside, level 3.  Will admit the patient for IV antibiotics and placement in a skilled nursing facility.    Portions of the record may have been created with voice recognition software. Occasional wrong word or \"sound a like\" substitutions may have occurred due to the inherent limitations of voice recognition software. Read the chart carefully and recognize, using context, where substitutions have occurred.           Amount and/or Complexity of Data Reviewed  Labs: ordered.  Radiology: ordered and independent interpretation performed.    Risk  Prescription drug management.  Decision regarding " hospitalization.                 Disposition  Final diagnoses:   Pneumonia   Hearing impaired person, bilateral     Time reflects when diagnosis was documented in both MDM as applicable and the Disposition within this note       Time User Action Codes Description Comment    7/14/2024  1:03 AM Hunter Choi [J18.9] Pneumonia     7/14/2024  1:06 AM Hunter Choi [H91.93] Hearing impaired person, bilateral           ED Disposition       ED Disposition   Admit    Condition   Stable    Date/Time   Sun Jul 14, 2024 0102    Comment   Case was discussed with Dr. ERNESTINA Mobley and the patient's admission status was agreed to be Admission Status: inpatient status to the service of Dr. ERNESTINA Mobley .               Follow-up Information    None         Patient's Medications   Discharge Prescriptions    No medications on file       No discharge procedures on file.    PDMP Review         Value Time User    PDMP Reviewed  Yes 7/8/2024  8:59 AM RONY Coe            ED Provider  Electronically Signed by             Hunter Choi III,   07/14/24 0128

## 2024-07-14 NOTE — NURSING NOTE
Admit to rm 313 via w/c with dx confusion. Assisted to bed with slider board and 3 nurses into bed. Awake and alert. Expressive aphasia hx. Very [place]. R/a status and resp easy. Heart is regular. Lungs clear. No sob. Abdomen is soft and non tender lbm 7/13/24. Denies pain. No edema. No distress hx of inc urine. Bed alarm on. Family here and left for the night.

## 2024-07-14 NOTE — ASSESSMENT & PLAN NOTE
Chest x-ray shows right lower lobe pneumonia  Suspect aspiration secondary to history of dysphagia  Continue ceftriaxone, azithromycin  Follow-up ESR, CRP, urine Legionella

## 2024-07-14 NOTE — H&P
VTE Pharmacologic Prophylaxis:   Moderate Risk (Score 3-4) - Pharmacological DVT Prophylaxis Ordered: enoxaparin (Lovenox).  Code Status: Full code  Discussion with family: Updated  (daughter) at bedside.    Anticipated Length of Stay: Patient will be admitted on an observation basis with an anticipated length of stay of less than 2 midnights secondary to generalized weakness, PNA.    Total Time Spent on Date of Encounter in care of patient: 45 mins. This time was spent on one or more of the following: performing physical exam; counseling and coordination of care; obtaining or reviewing history; documenting in the medical record; reviewing/ordering tests, medications or procedures; communicating with other healthcare professionals and discussing with patient's family/caregivers.    Chief Complaint: Generalized weakness, altered mental status    History of Present Illness:  Thomas Chase is a 83 y.o. male with a PMH of vascular dementia, severe protein calorie malnutrition, stents, insomnia, peripheral vascular disease, prior history of aspiration of right lower lobe pneumonia, prior subdural hematoma: February 2024 s/p craniotomy 2 years ago, dysphagia, hearing impairment who presents with family, with a chief complaint two days  of generalized increase weakness, increased confusion with anxiety and balance issues.  Unfortunately patient is unable to provide any information.   Patient's daughter and wife at the bedside states that the patient has been noticed more disoriented than at his baseline with unbalanced gait.  He appeared very weak to the point where 2 people could not hold him.  They noticed patient was coughing more than usual.  Family reports history of dysphagia. Patient also taking Bactrim for UTI.   Family reports having increasing challenges taking care of the patient at home who has 24-hour care concerned that they are unable to take care  and ask in  assistance in placement of the  patient in a skilled nursing facility no recent falls.    Patient does not normally speak is unable to communicate effectively, communicates by writing on the board, is severely hearing impaired.  Chest x-ray showed right lower lobe pneumonia.  Labs unrevealing.  On my evaluation patient appears not in acute distress, hemodynamically stable.    Family denies any fevers, nausea, vomiting, diarrhea.      Review of Systems:  Review of Systems    Past Medical and Surgical History:   Past Medical History:   Diagnosis Date    Abnormal echocardiogram 06/27/2022    Arthritis     EKG abnormalities 06/19/2022    Encounter for general adult medical examination without abnormal findings 03/20/2019    Fall 11/03/2022    Hearing loss 04/08/2009    Hyperlipidemia     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    Macular degeneration, wet (HCC)     Urinary retention 06/02/2022       Past Surgical History:   Procedure Laterality Date    BRAIN HEMATOMA EVACUATION Left 05/28/2022    Procedure: left CRANIOTOMY FOR SUBDURAL HEMATOMA;  Surgeon: Chris Watts MD;  Location:  MAIN OR;  Service: Neurosurgery    CARPAL TUNNEL RELEASE      HERNIA REPAIR Right     inguinal    IR CEREBRAL ANGIOGRAPHY / INTERVENTION  06/02/2022    ND COCHLEAR DEVICE IMPLANTATION W/WO MASTOIDECTOMY Left 1/17/2023    Procedure: LEFT COCHLEAR IMPLANTATION WITH MASTOIDECTOMY AND FACIAL NERVE MONITORING WITH AUDIOMETRIC TESTING OF THE IMPLANT;  Surgeon: Susie Tuttle MD;  Location:  MAIN OR;  Service: ENT    ND NEUROPLASTY &/TRANSPOS MEDIAN NRV CARPAL TUNNE Right 09/20/2021    Procedure: RELEASE CARPAL TUNNEL;  Surgeon: Bruno Segovia MD;  Location: [facility];  Service: Orthopedics       Meds/Allergies:  Prior to Admission medications    Medication Sig Start Date End Date Taking? Authorizing Provider   acetaminophen (TYLENOL) 325 mg tablet Take 2 tablets (650 mg total) by mouth every 6 (six) hours as needed for mild pain 5/2/24   Jesenia Beltran  RONY Badillo   cyanocobalamin (VITAMIN B-12) 500 MCG tablet Take 1 tablet (500 mcg total) by mouth daily 5/2/24   RONY Coe   loratadine (CLARITIN) 10 mg tablet Take 1 tablet (10 mg total) by mouth daily 5/9/24   RONY Coe   LORazepam (Ativan) 0.5 mg tablet Take 0.5 tablets (0.25 mg total) by mouth every 8 (eight) hours as needed for anxiety 7/8/24   RONY Coe   melatonin 3 mg Take 3 tablets (9 mg total) by mouth daily at bedtime 5/2/24   RONY Coe   midodrine (PROAMATINE) 5 mg tablet Take 1 tablet (5 mg total) by mouth 3 (three) times a day before meals 5/2/24   RONY Coe   Multiple Vitamin (multivitamin) tablet Take 1 tablet by mouth daily 5/2/24   RONY Coe   nystatin (MYCOSTATIN) 500,000 units/5 mL suspension Apply 5 mL (500,000 Units total) to the mouth or throat 4 (four) times a day 5/14/24   RONY Coe   polyethylene glycol (MIRALAX) 17 g packet Take 17 g by mouth daily as needed (constipation) 5/2/24   RONY Coe   QUEtiapine (SEROquel) 25 mg tablet Take 0.5 tablets (12.5 mg total) by mouth daily at bedtime as needed (insomnia, agitation)  Patient not taking: Reported on 5/20/2024 4/18/24   RONY Coe   senna (SENOKOT) 8.6 mg Take 1 tablet (8.6 mg total) by mouth daily at bedtime 5/2/24   RONY Coe   sertraline (ZOLOFT) 25 mg tablet Take 1.5 tablets (37.5 mg total) by mouth daily 5/15/24   RONY Coe   simvastatin (ZOCOR) 20 mg tablet Take 1 tablet (20 mg total) by mouth daily at bedtime 5/2/24   RONY Coe   tamsulosin (FLOMAX) 0.4 mg Take 2 capsules (0.8 mg total) by mouth daily with dinner 4/18/24   Norberto Boyer MD     I have reviewed home medications with patient personally.    Allergies: No Known Allergies    Social History:  Marital Status: /Civil Union   Occupation:   Patient Pre-hospital  "Living Situation: Home  Patient Pre-hospital Level of Mobility: walks with person assist  Patient Pre-hospital Diet Restrictions:   Substance Use History:   Social History     Substance and Sexual Activity   Alcohol Use Not Currently    Alcohol/week: 3.0 standard drinks of alcohol    Types: 3 Cans of beer per week     Social History     Tobacco Use   Smoking Status Former   Smokeless Tobacco Never   Tobacco Comments    Smoked as a teenager     Social History     Substance and Sexual Activity   Drug Use No       Family History:      Physical Exam:     Vitals:   Blood Pressure: 109/62 (07/14/24 0142)  Pulse: 70 (07/14/24 0142)  Temperature: 98.1 °F (36.7 °C) (07/14/24 0142)  Respirations: 18 (07/14/24 0142)  Height: 5' 10\" (177.8 cm) (07/13/24 2230)  Weight - Scale: 59.4 kg (131 lb) (07/13/24 2230)  SpO2: 94 % (07/14/24 0142)    Physical Exam  Constitutional:       General: He is not in acute distress.     Appearance: He is ill-appearing. He is not toxic-appearing or diaphoretic.   HENT:      Head: Normocephalic and atraumatic.      Right Ear: External ear normal.      Left Ear: External ear normal.      Nose: Nose normal.   Eyes:      Extraocular Movements: Extraocular movements intact.      Pupils: Pupils are equal, round, and reactive to light.   Cardiovascular:      Rate and Rhythm: Normal rate and regular rhythm.   Pulmonary:      Effort: Pulmonary effort is normal.      Breath sounds: Normal breath sounds.   Abdominal:      General: Abdomen is flat. Bowel sounds are normal.      Palpations: Abdomen is soft.   Musculoskeletal:      Cervical back: Normal range of motion and neck supple.   Skin:     General: Skin is warm and dry.      Capillary Refill: Capillary refill takes less than 2 seconds.   Neurological:      Mental Status: He is alert. Mental status is at baseline. He is disoriented.          Additional Data:     Lab Results:  Results from last 7 days   Lab Units 07/13/24  2120   WBC Thousand/uL 8.39 "   HEMOGLOBIN g/dL 11.6*   HEMATOCRIT % 36.0*   PLATELETS Thousands/uL 429*   SEGS PCT % 77*   LYMPHO PCT % 14   MONO PCT % 8   EOS PCT % 1     Results from last 7 days   Lab Units 07/13/24  2120   SODIUM mmol/L 137   POTASSIUM mmol/L 4.7   CHLORIDE mmol/L 104   CO2 mmol/L 27   BUN mg/dL 21   CREATININE mg/dL 0.82   ANION GAP mmol/L 6   CALCIUM mg/dL 9.0   ALBUMIN g/dL 3.6   TOTAL BILIRUBIN mg/dL 0.29   ALK PHOS U/L 93   ALT U/L 20   AST U/L 19   GLUCOSE RANDOM mg/dL 93     Results from last 7 days   Lab Units 07/13/24  2120   INR  1.04         Lab Results   Component Value Date    HGBA1C 6.0 (H) 02/17/2024     Results from last 7 days   Lab Units 07/13/24  2244 07/13/24 2120   LACTIC ACID mmol/L 0.7  --    PROCALCITONIN ng/ml  --  <0.05       Lines/Drains:  Invasive Devices       Peripheral Intravenous Line  Duration             Peripheral IV 07/13/24 Dorsal (posterior);Left Forearm <1 day                        Imaging: Reviewed radiology reports from this admission including: CT head  CT head without contrast   Final Result by Radha Alva MD (07/14 0053)      No acute intracranial abnormality.  Chronic microangiopathic changes.                  Workstation performed: [id number]         XR chest 1 view portable   ED Interpretation by Hunter Choi III, DO (07/13 2214)   Right lower lobe pneumonia noted on 1 view chest x-ray.      Final Result by Abbie Cedillo MD (07/13 2318)      Mild right base opacity which could be due to pneumonia.            Workstation performed: [id number]             EKG and Other Studies Reviewed on Admission:   EKG: Personally Reviewed.    ** Please Note: This note has been constructed using a voice recognition system. **  [facility]  H&P  Name: Thomas Chase 83 y.o. male I MRN: [id number]  Unit/Bed#: -01 I Date of Admission: 7/13/2024   Date of Service: 7/14/2024 I Hospital Day: 0      Assessment & Plan   Bilateral lower extremity  weakness  Assessment & Plan  PT/OT evaluation  Family wants placement as at this point they are not able to take care of the patient at home  Patient presently has 24/7 home health aide    * Aspiration pneumonia (HCC)  Assessment & Plan  Chest x-ray shows right lower lobe pneumonia  Suspect aspiration secondary to history of dysphagia  Continue ceftriaxone, azithromycin  Follow-up ESR, CRP, urine Legionella    Acute metabolic encephalopathy  Assessment & Plan  Suspected in addition acute metabolic encephalopathy  Continue monitoring mental status    Dysphagia  Assessment & Plan  Puréed diet  Aspiration precautions    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HCC)  Assessment & Plan  CT head with no acute changes consistent with this, chronic vascular changes    UTI (urinary tract infection)  Assessment & Plan  Was taking Bactrim at home, now on hold  Continue ceftriaxone    Benign prostatic hyperplasia without lower urinary tract symptoms  Assessment & Plan  Continue tamsulosin    H/O traumatic subdural hematoma  Assessment & Plan  No acute changes on imaging  Continue monitoring    Sensorineural hearing loss (SNHL), bilateral  Assessment & Plan  Patient is very hard of hearing      Diastolic dysfunction  Assessment & Plan  Does not appear fluid overloaded  Will give mild fluid hydration  Continue monitoring

## 2024-07-14 NOTE — PLAN OF CARE
Problem: Potential for Falls  Goal: Patient will remain free of falls  Description: INTERVENTIONS:  - Educate patient/family on patient safety including physical limitations  - Instruct patient to call for assistance with activity   - Consult OT/PT to assist with strengthening/mobility   - Keep Call bell within reach  - Keep bed low and locked with side rails adjusted as appropriate  - Keep care items and personal belongings within reach  - Initiate and maintain comfort rounds  - Make Fall Risk Sign visible to staff  - Offer Toileting every 2 Hours, in advance of need  - Initiate/Maintain bedalarm  - Obtain necessary fall risk management equipment:   - Apply yellow socks and bracelet for high fall risk patients  - Consider moving patient to room near nurses station  7/14/2024 0321 by Gwen Bernal RN  Outcome: Progressing  7/14/2024 0259 by Gwen Bernal RN  Outcome: Progressing     Problem: Prexisting or High Potential for Compromised Skin Integrity  Goal: Skin integrity is maintained or improved  Description: INTERVENTIONS:  - Identify patients at risk for skin breakdown  - Assess and monitor skin integrity  - Assess and monitor nutrition and hydration status  - Monitor labs   - Assess for incontinence   - Turn and reposition patient  - Assist with mobility/ambulation  - Relieve pressure over bony prominences  - Avoid friction and shearing  - Provide appropriate hygiene as needed including keeping skin clean and dry  - Evaluate need for skin moisturizer/barrier cream  - Collaborate with interdisciplinary team   - Patient/family teaching  - Consider wound care consult   7/14/2024 0321 by Gwen Bernal RN  Outcome: Progressing  7/14/2024 0259 by Gwen Bernal RN  Outcome: Progressing     Problem: PAIN - ADULT  Goal: Verbalizes/displays adequate comfort level or baseline comfort level  Description: Interventions:  - Encourage patient to monitor pain and request assistance  - Assess pain  using appropriate pain scale  - Administer analgesics based on type and severity of pain and evaluate response  - Implement non-pharmacological measures as appropriate and evaluate response  - Consider cultural and social influences on pain and pain management  - Notify physician/advanced practitioner if interventions unsuccessful or patient reports new pain  7/14/2024 0321 by Gwen Bernal RN  Outcome: Progressing  7/14/2024 0259 by Gwen Bernal RN  Outcome: Progressing     Problem: INFECTION - ADULT  Goal: Absence or prevention of progression during hospitalization  Description: INTERVENTIONS:  - Assess and monitor for signs and symptoms of infection  - Monitor lab/diagnostic results  - Monitor all insertion sites, i.e. indwelling lines, tubes, and drains  - Monitor endotracheal if appropriate and nasal secretions for changes in amount and color  - [place] appropriate cooling/warming therapies per order  - Administer medications as ordered  - Instruct and encourage patient and family to use good hand hygiene technique  - Identify and instruct in appropriate isolation precautions for identified infection/condition  7/14/2024 0321 by Gwen Bernal RN  Outcome: Progressing  7/14/2024 0259 by Gwen Bernal RN  Outcome: Progressing  Goal: Absence of fever/infection during neutropenic period  Description: INTERVENTIONS:  - Monitor WBC    7/14/2024 0321 by Gwen Bernal RN  Outcome: Progressing  7/14/2024 0259 by Gwen Bernal RN  Outcome: Progressing     Problem: SAFETY ADULT  Goal: Patient will remain free of falls  Description: INTERVENTIONS:  - Educate patient/family on patient safety including physical limitations  - Instruct patient to call for assistance with activity   - Consult OT/PT to assist with strengthening/mobility   - Keep Call bell within reach  - Keep bed low and locked with side rails adjusted as appropriate  - Keep care items and personal belongings within  reach  - Initiate and maintain comfort rounds  - Make Fall Risk Sign visible to staff  - Offer Toileting every 2  Hours, in advance of need  - Initiate/Maintain bed alarm  - Obtain necessary fall risk management equipment:   - Apply yellow socks and bracelet for high fall risk patients  - Consider moving patient to room near nurses station  7/14/2024 0321 by Gwen Bernal RN  Outcome: Progressing  7/14/2024 0259 by Gwen Bernal RN  Outcome: Progressing  Goal: Maintain or return to baseline ADL function  Description: INTERVENTIONS:  -  Assess patient's ability to carry out ADLs; assess patient's baseline for ADL function and identify physical deficits which impact ability to perform ADLs (bathing, care of mouth/teeth, toileting, grooming, dressing, etc.)  - Assess/evaluate cause of self-care deficits   - Assess range of motion  - Assess patient's mobility; develop plan if impaired  - Assess patient's need for assistive devices and provide as appropriate  - Encourage maximum independence but intervene and supervise when necessary  - Involve family in performance of ADLs  - Assess for home care needs following discharge   - Consider OT consult to assist with ADL evaluation and planning for discharge  - Provide patient education as appropriate  7/14/2024 0321 by Gwen Bernal RN  Outcome: Progressing  7/14/2024 0259 by Gwen Bernal RN  Outcome: Progressing  Goal: Maintains/Returns to pre admission functional level  Description: INTERVENTIONS:  - Perform AM-PAC 6 Click Basic Mobility/ Daily Activity assessment daily.  - Set and communicate daily mobility goal to care team and patient/family/caregiver.   - Collaborate with rehabilitation services on mobility goals if consulted  - Reposition patient every 2 hours.  -  - Stand patient 2  times a day  - Ambulate patient 2  times a day  - Out of bed to chair 2  times a day   - Out of bed for meals 2  times a day  - Out of bed for toileting  - Record  patient progress and toleration of activity level   7/14/2024 0321 by Gwen Bernal RN  Outcome: Progressing  7/14/2024 0259 by Gwen Bernal RN  Outcome: Progressing     Problem: DISCHARGE PLANNING  Goal: Discharge to home or other facility with appropriate resources  Description: INTERVENTIONS:  - Identify barriers to discharge w/patient and caregiver  - Arrange for needed discharge resources and transportation as appropriate  - Identify discharge learning needs (meds, wound care, etc.)  - Arrange for interpretive services to assist at discharge as needed  - Refer to Case Management Department for coordinating discharge planning if the patient needs post-hospital services based on physician/advanced practitioner order or complex needs related to functional status, cognitive ability, or social support system  7/14/2024 0321 by Gwen Bernal RN  Outcome: Progressing  7/14/2024 0259 by Gwen Bernal RN  Outcome: Progressing     Problem: Knowledge Deficit  Goal: Patient/family/caregiver demonstrates understanding of disease process, treatment plan, medications, and discharge instructions  Description: Complete learning assessment and assess knowledge base.  Interventions:  - Provide teaching at level of understanding  - Provide teaching via preferred learning methods  7/14/2024 0321 by Gwen Bernal RN  Outcome: Progressing  7/14/2024 0259 by Gwen Bernal RN  Outcome: Progressing     Problem: GENITOURINARY - ADULT  Goal: Maintains or returns to baseline urinary function  Description: INTERVENTIONS:  - Assess urinary function  - Encourage oral fluids to ensure adequate hydration if ordered  - Administer IV fluids as ordered to ensure adequate hydration  - Administer ordered medications as needed  - Offer frequent toileting  - Follow urinary retention protocol if ordered  7/14/2024 0321 by Gwen Bernal RN  Outcome: Progressing  7/14/2024 0259 by Gwen Bernal  RN  Outcome: Progressing  Goal: Absence of urinary retention  Description: INTERVENTIONS:  - Assess patient’s ability to void and empty bladder  - Monitor I/O  - Bladder scan as needed  - Discuss with physician/AP medications to alleviate retention as needed  - Discuss catheterization for long term situations as appropriate  7/14/2024 0321 by Gwen Bernal RN  Outcome: Progressing  7/14/2024 0259 by Gwen Bernal RN  Outcome: Progressing     Problem: METABOLIC, FLUID AND ELECTROLYTES - ADULT  Goal: Electrolytes maintained within normal limits  Description: INTERVENTIONS:  - Monitor labs and assess patient for signs and symptoms of electrolyte imbalances  - Administer electrolyte replacement as ordered  - Monitor response to electrolyte replacements, including repeat lab results as appropriate  - Instruct patient on fluid and nutrition as appropriate  7/14/2024 0321 by Gwen Bernal RN  Outcome: Progressing  7/14/2024 0259 by Gwen Bernal RN  Outcome: Progressing  Goal: Fluid balance maintained  Description: INTERVENTIONS:  - Monitor labs   - Monitor I/O and WT  - Instruct patient on fluid and nutrition as appropriate  - Assess for signs & symptoms of volume excess or deficit  7/14/2024 0321 by Gwen Bernal RN  Outcome: Progressing  7/14/2024 0259 by Gwen Bernal RN  Outcome: Progressing  Goal: Glucose maintained within target range  Description: INTERVENTIONS:  - Monitor Blood Glucose as ordered  - Assess for signs and symptoms of hyperglycemia and hypoglycemia  - Administer ordered medications to maintain glucose within target range  - Assess nutritional intake and initiate nutrition service referral as needed  7/14/2024 0321 by Gwen Bernal RN  Outcome: Progressing  7/14/2024 0259 by Gwen Bernal RN  Outcome: Progressing     Problem: MUSCULOSKELETAL - ADULT  Goal: Maintain or return mobility to safest level of function  Description: INTERVENTIONS:  - Assess  patient's ability to carry out ADLs; assess patient's baseline for ADL function and identify physical deficits which impact ability to perform ADLs (bathing, care of mouth/teeth, toileting, grooming, dressing, etc.)  - Assess/evaluate cause of self-care deficits   - Assess range of motion  - Assess patient's mobility  - Assess patient's need for assistive devices and provide as appropriate  - Encourage maximum independence but intervene and supervise when necessary  - Involve family in performance of ADLs  - Assess for home care needs following discharge   - Consider OT consult to assist with ADL evaluation and planning for discharge  - Provide patient education as appropriate  7/14/2024 0321 by Gwen Bernal, MERY  Outcome: Progressing  7/14/2024 0259 by Gwen Bernal RN  Outcome: Progressing     Problem: Nutrition/Hydration-ADULT  Goal: Nutrient/Hydration intake appropriate for improving, restoring or maintaining nutritional needs  Description: Monitor and assess patient's nutrition/hydration status for malnutrition. Collaborate with interdisciplinary team and initiate plan and interventions as ordered.  Monitor patient's weight and dietary intake as ordered or per policy. Utilize nutrition screening tool and intervene as necessary. Determine patient's food preferences and provide high-protein, high-caloric foods as appropriate.     INTERVENTIONS:  - Monitor oral intake, urinary output, labs, and treatment plans  - Assess nutrition and hydration status and recommend course of action  - Evaluate amount of meals eaten  - Assist patient with eating if necessary   - Allow adequate time for meals  - Recommend/ encourage appropriate diets, oral nutritional supplements, and vitamin/mineral supplements  - Order, calculate, and assess calorie counts as needed  - Recommend, monitor, and adjust tube feedings and TPN/PPN based on assessed needs  - Assess need for intravenous fluids  - Provide specific  nutrition/hydration education as appropriate  - Include patient/family/caregiver in decisions related to nutrition  7/14/2024 0321 by Gwen Bernal, RN  Outcome: Progressing  7/14/2024 0259 by Gwen Bernal, RN  Outcome: Progressing

## 2024-07-14 NOTE — PLAN OF CARE
Problem: Potential for Falls  Goal: Patient will remain free of falls  Description: INTERVENTIONS:  - Educate patient/family on patient safety including physical limitations  - Instruct patient to call for assistance with activity   - Consult OT/PT to assist with strengthening/mobility   - Keep Call bell within reach  - Keep bed low and locked with side rails adjusted as appropriate  - Keep care items and personal belongings within reach  - Initiate and maintain comfort rounds  - Make Fall Risk Sign visible to staff  - Offer Toileting every 2 Hours, in advance of need  - Initiate/Maintain bed alarm  - Obtain necessary fall risk management equipment: nonslip socks  - Apply yellow socks and bracelet for high fall risk patients  - Consider moving patient to room near nurses station  Outcome: Progressing     Problem: Prexisting or High Potential for Compromised Skin Integrity  Goal: Skin integrity is maintained or improved  Description: INTERVENTIONS:  - Identify patients at risk for skin breakdown  - Assess and monitor skin integrity  - Assess and monitor nutrition and hydration status  - Monitor labs   - Assess for incontinence   - Turn and reposition patient  - Assist with mobility/ambulation  - Relieve pressure over bony prominences  - Avoid friction and shearing  - Provide appropriate hygiene as needed including keeping skin clean and dry  - Evaluate need for skin moisturizer/barrier cream  - Collaborate with interdisciplinary team   - Patient/family teaching  - Consider wound care consult   Outcome: Progressing     Problem: PAIN - ADULT  Goal: Verbalizes/displays adequate comfort level or baseline comfort level  Description: Interventions:  - Encourage patient to monitor pain and request assistance  - Assess pain using appropriate pain scale  - Administer analgesics based on type and severity of pain and evaluate response  - Implement non-pharmacological measures as appropriate and evaluate response  - Consider  cultural and social influences on pain and pain management  - Notify physician/advanced practitioner if interventions unsuccessful or patient reports new pain  Outcome: Progressing     Problem: INFECTION - ADULT  Goal: Absence or prevention of progression during hospitalization  Description: INTERVENTIONS:  - Assess and monitor for signs and symptoms of infection  - Monitor lab/diagnostic results  - Monitor all insertion sites, i.e. indwelling lines, tubes, and drains  - Monitor endotracheal if appropriate and nasal secretions for changes in amount and color  - [place] appropriate cooling/warming therapies per order  - Administer medications as ordered  - Instruct and encourage patient and family to use good hand hygiene technique  - Identify and instruct in appropriate isolation precautions for identified infection/condition  Outcome: Progressing  Goal: Absence of fever/infection during neutropenic period  Description: INTERVENTIONS:  - Monitor WBC    Outcome: Progressing     Problem: SAFETY ADULT  Goal: Patient will remain free of falls  Description: INTERVENTIONS:  - Educate patient/family on patient safety including physical limitations  - Instruct patient to call for assistance with activity   - Consult OT/PT to assist with strengthening/mobility   - Keep Call bell within reach  - Keep bed low and locked with side rails adjusted as appropriate  - Keep care items and personal belongings within reach  - Initiate and maintain comfort rounds  - Make Fall Risk Sign visible to staff  - Offer Toileting every 2 Hours, in advance of need  - Initiate/Maintain bed alarm  - Obtain necessary fall risk management equipment: nonslip socks  - Apply yellow socks and bracelet for high fall risk patients  - Consider moving patient to room near nurses station  Outcome: Progressing  Goal: Maintain or return to baseline ADL function  Description: INTERVENTIONS:  -  Assess patient's ability to carry out ADLs; assess patient's  baseline for ADL function and identify physical deficits which impact ability to perform ADLs (bathing, care of mouth/teeth, toileting, grooming, dressing, etc.)  - Assess/evaluate cause of self-care deficits   - Assess range of motion  - Assess patient's mobility; develop plan if impaired  - Assess patient's need for assistive devices and provide as appropriate  - Encourage maximum independence but intervene and supervise when necessary  - Involve family in performance of ADLs  - Assess for home care needs following discharge   - Consider OT consult to assist with ADL evaluation and planning for discharge  - Provide patient education as appropriate  Outcome: Progressing  Goal: Maintains/Returns to pre admission functional level  Description: INTERVENTIONS:  - Perform AM-PAC 6 Click Basic Mobility/ Daily Activity assessment daily.  - Set and communicate daily mobility goal to care team and patient/family/caregiver.   - Collaborate with rehabilitation services on mobility goals if consulted  - Perform Range of Motion 3 times a day.  - Reposition patient every 2 hours.  - Dangle patient 3 times a day  - Stand patient 3 times a day  - Ambulate patient 3 times a day  - Out of bed to chair 3 times a day   - Out of bed for meals 3 times a day  - Out of bed for toileting  - Record patient progress and toleration of activity level   Outcome: Progressing     Problem: DISCHARGE PLANNING  Goal: Discharge to home or other facility with appropriate resources  Description: INTERVENTIONS:  - Identify barriers to discharge w/patient and caregiver  - Arrange for needed discharge resources and transportation as appropriate  - Identify discharge learning needs (meds, wound care, etc.)  - Arrange for interpretive services to assist at discharge as needed  - Refer to Case Management Department for coordinating discharge planning if the patient needs post-hospital services based on physician/advanced practitioner order or complex needs  related to functional status, cognitive ability, or social support system  Outcome: Progressing     Problem: Knowledge Deficit  Goal: Patient/family/caregiver demonstrates understanding of disease process, treatment plan, medications, and discharge instructions  Description: Complete learning assessment and assess knowledge base.  Interventions:  - Provide teaching at level of understanding  - Provide teaching via preferred learning methods  Outcome: Progressing

## 2024-07-14 NOTE — ASSESSMENT & PLAN NOTE
PT/OT evaluation  Family wants placement as at this point they are not able to take care of the patient at home  Patient presently has 24/7 home health aide

## 2024-07-15 PROBLEM — Z86.79 H/O ORTHOSTATIC HYPOTENSION: Status: ACTIVE | Noted: 2024-07-15

## 2024-07-15 LAB
ANION GAP SERPL CALCULATED.3IONS-SCNC: 7 MMOL/L (ref 4–13)
ATRIAL RATE: 78 BPM
BUN SERPL-MCNC: 15 MG/DL (ref 5–25)
CALCIUM SERPL-MCNC: 9 MG/DL (ref 8.4–10.2)
CHLORIDE SERPL-SCNC: 103 MMOL/L (ref 96–108)
CO2 SERPL-SCNC: 26 MMOL/L (ref 21–32)
CREAT SERPL-MCNC: 0.71 MG/DL (ref 0.6–1.3)
ERYTHROCYTE [DISTWIDTH] IN BLOOD BY AUTOMATED COUNT: 13.6 % (ref 11.6–15.1)
GFR SERPL CREATININE-BSD FRML MDRD: 86 ML/MIN/1.73SQ M
GLUCOSE SERPL-MCNC: 107 MG/DL (ref 65–140)
GLUCOSE SERPL-MCNC: 111 MG/DL (ref 65–140)
HCT VFR BLD AUTO: 37.4 % (ref 36.5–49.3)
HGB BLD-MCNC: 12 G/DL (ref 12–17)
MAGNESIUM SERPL-MCNC: 2 MG/DL (ref 1.9–2.7)
MCH RBC QN AUTO: 30.4 PG (ref 26.8–34.3)
MCHC RBC AUTO-ENTMCNC: 32.1 G/DL (ref 31.4–37.4)
MCV RBC AUTO: 95 FL (ref 82–98)
P AXIS: 63 DEGREES
PLATELET # BLD AUTO: 425 THOUSANDS/UL (ref 149–390)
PMV BLD AUTO: 9.1 FL (ref 8.9–12.7)
POTASSIUM SERPL-SCNC: 4.1 MMOL/L (ref 3.5–5.3)
PR INTERVAL: 162 MS
QRS AXIS: 45 DEGREES
QRSD INTERVAL: 128 MS
QT INTERVAL: 430 MS
QTC INTERVAL: 490 MS
RBC # BLD AUTO: 3.95 MILLION/UL (ref 3.88–5.62)
SODIUM SERPL-SCNC: 136 MMOL/L (ref 135–147)
T WAVE AXIS: 61 DEGREES
VENTRICULAR RATE: 78 BPM
WBC # BLD AUTO: 9.61 THOUSAND/UL (ref 4.31–10.16)

## 2024-07-15 PROCEDURE — 99232 SBSQ HOSP IP/OBS MODERATE 35: CPT | Performed by: HOSPITALIST

## 2024-07-15 PROCEDURE — 97163 PT EVAL HIGH COMPLEX 45 MIN: CPT

## 2024-07-15 PROCEDURE — 80048 BASIC METABOLIC PNL TOTAL CA: CPT | Performed by: INTERNAL MEDICINE

## 2024-07-15 PROCEDURE — 85027 COMPLETE CBC AUTOMATED: CPT | Performed by: INTERNAL MEDICINE

## 2024-07-15 PROCEDURE — 83735 ASSAY OF MAGNESIUM: CPT | Performed by: INTERNAL MEDICINE

## 2024-07-15 PROCEDURE — 93010 ELECTROCARDIOGRAM REPORT: CPT | Performed by: INTERNAL MEDICINE

## 2024-07-15 PROCEDURE — 97167 OT EVAL HIGH COMPLEX 60 MIN: CPT

## 2024-07-15 PROCEDURE — 82948 REAGENT STRIP/BLOOD GLUCOSE: CPT

## 2024-07-15 PROCEDURE — 92610 EVALUATE SWALLOWING FUNCTION: CPT

## 2024-07-15 RX ORDER — AMOXICILLIN AND CLAVULANATE POTASSIUM 875; 125 MG/1; MG/1
1 TABLET, FILM COATED ORAL EVERY 12 HOURS SCHEDULED
Status: DISCONTINUED | OUTPATIENT
Start: 2024-07-15 | End: 2024-07-16

## 2024-07-15 RX ADMIN — METRONIDAZOLE 500 MG: 500 INJECTION, SOLUTION INTRAVENOUS at 02:30

## 2024-07-15 RX ADMIN — Medication 9 MG: at 21:27

## 2024-07-15 RX ADMIN — AMOXICILLIN AND CLAVULANATE POTASSIUM 1 TABLET: 875; 125 TABLET, FILM COATED ORAL at 10:23

## 2024-07-15 RX ADMIN — MIDODRINE HYDROCHLORIDE 5 MG: 5 TABLET ORAL at 12:11

## 2024-07-15 RX ADMIN — MIDODRINE HYDROCHLORIDE 5 MG: 5 TABLET ORAL at 06:25

## 2024-07-15 RX ADMIN — MIDODRINE HYDROCHLORIDE 5 MG: 5 TABLET ORAL at 17:34

## 2024-07-15 RX ADMIN — ENOXAPARIN SODIUM 40 MG: 40 INJECTION SUBCUTANEOUS at 10:23

## 2024-07-15 RX ADMIN — SERTRALINE HYDROCHLORIDE 37.5 MG: 25 TABLET ORAL at 10:22

## 2024-07-15 RX ADMIN — SENNOSIDES 8.6 MG: 8.6 TABLET, FILM COATED ORAL at 21:27

## 2024-07-15 RX ADMIN — PRAVASTATIN SODIUM 40 MG: 40 TABLET ORAL at 17:34

## 2024-07-15 RX ADMIN — TAMSULOSIN HYDROCHLORIDE 0.8 MG: 0.4 CAPSULE ORAL at 17:34

## 2024-07-15 RX ADMIN — AMOXICILLIN AND CLAVULANATE POTASSIUM 1 TABLET: 875; 125 TABLET, FILM COATED ORAL at 21:27

## 2024-07-15 NOTE — PLAN OF CARE
Problem: OCCUPATIONAL THERAPY ADULT  Goal: Performs self-care activities at highest level of function for planned discharge setting.  See evaluation for individualized goals.  Description: Treatment Interventions: ADL retraining, Functional transfer training, Endurance training, Cognitive reorientation, Patient/family training, Equipment evaluation/education, Compensatory technique education, Energy conservation, Activityengagement          See flowsheet documentation for full assessment, interventions and recommendations.   Note: Limitation: Decreased UE strength, Decreased cognition, Decreased endurance, Decreased self-care trans, Decreased high-level ADLs  Prognosis: Fair  Assessment: Patient is a 83 y.o. male seen for OT evaluation s/p admit to  [facility] on 7/13/2024 w/Aspiration pneumonia (HCC) + commorbidities/PMHx (as listed in medical record) affecting patient's functional performance c ADL tasks at time of assessment. OT orders placed for evaluation and treatment to assess pt's ADLs, cognitive status + performance during functional tasks in order to formulate appropriate d/c recommendations.     Therapist performed at least two patient identifiers during session including name and wristband. Personal factors affecting patient at time of initial evaluation include: step(s) to enter environment, multi-level environment, advanced age, inability to ambulate household distances, decreased initiation and engagement, and difficulty communicating.   Pt's clinical presentation is currently unstable/unpredictable given new onset deficits that effect pt's occupational performance and ability to safely return to PLOF including decrease activity tolerance, decrease standing balance, decrease sitting balance, decrease performance during ADL tasks, decrease cognition, decrease safety awareness , increase impulsiveness, decrease generalized strength, decrease activity engagement, and decrease performance during  functional transfers combined with medical complications of change in mental status, abnormal CBC, impulsivity during admission, and need for input for mobility technique/safety. This evaluation required an extensive review of medical and/or therapy records and additional review of physical, cognitive and psychosocial history related to functional performance. Based upon functional performance deficits and assessments, this evaluation has been identified as a  high complexity evaluation.      Patient to benefit from continued Occupational Therapy treatment while in the hospital to address aforementioned deficits and maximize level of functional independence with ADLs and functional mobility. Pt currently requires A to facilitate appropriate d/c plan.     Rehab Resource Intensity Level, OT: II (Moderate Resource Intensity)

## 2024-07-15 NOTE — ASSESSMENT & PLAN NOTE
Patient was recently diagnosed with a Proteus mirabilis UTI on 7/5/2024  Patient was taking Bactrim at home  DC ceftriaxone, and Flagyl as outlined above, will switch to Augmentin which will cover both the possibility of an aspiration event, and his Proteus mirabilis UTI based on prior sensitivities   no

## 2024-07-15 NOTE — CASE MANAGEMENT
Case Management Assessment & Discharge Planning Note    Patient name Thomas Chase  Location /-01 MRN [id number]  : 1940 Date 7/15/2024       Current Admission Date: 2024  Current Admission Diagnosis:Aspiration pneumonia (HCC)   Patient Active Problem List    Diagnosis Date Noted Date Diagnosed    H/O orthostatic hypotension 07/15/2024     Aspiration pneumonia (HCC) 2024     Acute metabolic encephalopathy 2024     UTI (urinary tract infection) 2024     Peripheral vascular disease, unspecified ([facility]) 2024     Abnormal urinalysis 2024     Benign prostatic hyperplasia without lower urinary tract symptoms 2024     Insomnia 2024     Abnormal CT of the head 2024     Hematuria 2024     Headache 2024     Behavior safety risk 2024     Bilateral lower extremity weakness 2024     Abnormal CT scan, lumbar spine 02/15/2024     Severe protein-calorie malnutrition (HCC) 2024     Debility 2024     Pharyngeal myoclonus 2023     Dysphagia 2023     Macular degeneration, age related 2023     H/O traumatic subdural hematoma 2023     Sensorineural hearing loss (SNHL), bilateral 2022     Balance disorder 2022     Right bundle-branch block 2022     Low BP 2022     Diastolic dysfunction 2022     Constipation due to neurogenic bowel 2022     Urinary retention 2022     SDH (subdural hematoma) ([facility]) 2022     Primary osteoarthritis of left knee 2022     Hygroma 2022     Right hand pain      Carpal tunnel syndrome on right      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[facility]) 2021     Overweight (BMI 25.0-29.9) 2021     Negative depression screening 2019     Hypercholesterolemia 2018     Borderline hypertension 2018       LOS (days): 1  Geometric Mean LOS (GMLOS) (days): 5  Days to GMLOS:3.2     OBJECTIVE:    Risk of Unplanned  Readmission Score: 22.45         Current admission status: Inpatient  Referral Reason: Other (d/c planning)    Preferred Pharmacy:   [id number]  [place] - [address]  [address] [address]  Phone: [phone] Fax: [phone]    Primary Care Provider: RONY Coe    Primary Insurance: MEDICARE  Secondary Insurance: [facility]    ASSESSMENT:  Active Health Care Proxies       Rena Gina Health Care Representative - Spouse   Primary Phone: [phone] (Mobile)  Home Phone: [phone]                 Advance Directives  Does patient have a Health Care POA?: Yes  Does patient have Advance Directives?: Yes         Readmission Root Cause  30 Day Readmission: No    Patient Information  Admitted from:: Home  Mental Status: Alert  During Assessment patient was accompanied by: Spouse, Other-Comment (night time caregiver Kilo)  Assessment information provided by:: Spouse  Primary Caregiver: Private caregiver (pt has 24 hrs care)  Caregiver's Name:: Gina Chase  Caregiver's Relationship to Patient:: Family Member  Caregiver's Telephone Number:: [phone]  Support Systems: Spouse/significant other, Daughter, Son, Private Caregivers  County of Residence: [place]  What city do you live in?: [place]  Home entry access options. Select all that apply.: Stairs  Number of steps to enter home.: 1  Do the steps have railings?: No  Type of Current Residence: [address] [place] home  Upon entering residence, is there a bedroom on the main floor (no further steps)?: Yes  Upon entering residence, is there a bathroom on the main floor (no further steps)?: Yes  Living Arrangements: Lives w/ Spouse/significant other (pt has private caregivers  Kilo 12hrs 7pm -7am 7 days per week - caregiver 7am- 3pm  & another caregiver 3pm- 7pm)  Is patient a ?: No    Activities of Daily Living Prior to Admission  Functional Status: Assistance  Completes  ADLs independently?: No  Level of ADL dependence: Assistance  Ambulates independently?: No  Level of ambulatory dependence: Assistance  Does patient use assisted devices?: Yes  Assisted Devices (DME) used: Walker, Wheelchair, Bedside Commode, Shower Chair  Does patient currently own DME?: Yes  What DME does the patient currently own?: Bedside Commode, Shower Chair, Walker, Wheelchair  Does patient have a history of Outpatient Therapy (PT/OT)?: Yes  Does the patient have a history of Short-Term Rehab?: Yes (arc 2/20/2024  & arc 4/5/2024)  Does patient have a history of HHC?: Yes ([facility])  Does patient currently have HHC?: No         Patient Information Continued  Income Source: Pension/residential  Does patient have prescription coverage?: Yes ([employer] [place])  Does patient receive dialysis treatments?: No  Does patient have a history of substance abuse?: No  Does patient have a history of Mental Health Diagnosis?: Yes  Is patient receiving treatment for mental health?: Yes (depression)  Has patient received inpatient treatment related to mental health in the last 2 years?: Yes (medication from pcp)                Social Determinants of Health (SDOH)      Flowsheet Row Most Recent Value   Housing Stability    In the last 12 months, was there a time when you were not able to pay the mortgage or rent on time? N   In the past 12 months, how many times have you moved where you were living? 0   At any time in the past 12 months, were you homeless or living in a shelter (including now)? N   Transportation Needs    In the past 12 months, has lack of transportation kept you from medical appointments or from getting medications? no   In the past 12 months, has lack of transportation kept you from meetings, work, or from getting things needed for daily living? No   Food Insecurity    Within the past 12 months, you worried that your food would run out before you got the money to buy more. Never true   Within the past 12  months, the food you bought just didn't last and you didn't have money to get more. Never true   Utilities    In the past 12 months has the electric, gas, oil, or water company threatened to shut off services in your home? No            DISCHARGE DETAILS:    Discharge planning discussed with:: chase & wife & caregiver Kilo at the bedside  Success of Choice: Yes     CM contacted family/caregiver?: Yes             Contacts  Patient Contacts: Allegra Chase  Relationship to Patient:: Family (wife)  Contact Method: In Person  Reason/Outcome: Discharge Planning         DME Referral Provided  Referral made for DME?: No    Other Referral/Resources/Interventions Provided:  Referral Comments: pt/to obed ordered -  pt has 24hr caregivers Kilo 7p-7am 7 days per week &  a private caregiver  7am-3pm & another private caregiver 3pm-7pm    Would you like to participate in our [facility] service program?  : No - Declined    Treatment Team Recommendation:  (d/c plan tbd- TBD)

## 2024-07-15 NOTE — DISCHARGE INSTR - DIET
Video Swallow Study        Patient Name: Thomas Chase  Today's Date: 7/16/2024    Summary:  Images are on PACS for review.      Oral Phase :Pt presented with mild oral dysphagia. Bolus control, formation, and transfer were min reduced. Reduced bolus control resulted in spill to pharynx.      Pharyngeal Phase : Pt presented with severe pharyngeal dysphagia. Swallow initiation is delayed resulting in premature spill to the valleculae. Hyoid elevation, laryngeal excursion, and pharyngeal constriction is reduced.  Epiglottic inversion is incomplete. Airway closure was reduced. Pharyngeal retention in valleculae, along AE folds, in pyriforms, and in UES. Phillip aspiration observed with thin liquids before during and after swallow. Mod aspiration with nectar and honey thick liquids. Secondary swallow to clear retention was mostly absent, if pt was able to initiate swallow was severely delayed. Retropulsion resulted in aspiration of pharyngeal residue. Pt inconsistent cough response to aspiration. When pt did cough it was harsh and extended however unsuccessful in clearing residue.      Per Esophageal screen   View is limited as primarily focus on pharyngeal stage appeared adequate for intake.     Observations: Pt is [place]. He does have hx of cognitive impairment. Pt does utilize white board for simple direction following however primarily unable to follow commands when provided verbal, visual, and model cue. ST did not administer additional solids or 13mm pill as safety concern      Recommendations:  Diet:NPO ice chips for pleasure 1-2  at a time with nursing supervision. Consider alternate means of nutrition as appropriate. Consider GOC.   Meds: non oral   Upright position  F/u ST tx:yes   Therapy Prognosis:guarded   Prognosis considerations:age, medical status   **Full Supervision w/ ice chips for pleasure. Oral care prior to trials. Discontinue If increased difficulties with oral control, no initiation of swallow ,  excessive coughing, increased secretions, or excessive gurgly voice with no success cued cough throat clear  Aspiration Precautions  Reflux Precautions  Consider consult with: Nutrition, GI   Results reviewed with: pt, nursing, family, PA-C  Aspiration precautions posted.  If a dedicated assessment of the esophagus is desired, consider esophagram/barium swallow or EGD

## 2024-07-15 NOTE — PLAN OF CARE
Problem: PHYSICAL THERAPY ADULT  Goal: Performs mobility at highest level of function for planned discharge setting.  See evaluation for individualized goals.  Description: Treatment/Interventions: Functional transfer training, Therapeutic exercise, Endurance training, Gait training, Bed mobility, Equipment eval/education  Equipment Recommended: Walker       See flowsheet documentation for full assessment, interventions and recommendations.  Outcome: Progressing  Note: Prognosis: Fair     Assessment: Pt is 83 y.o. male seen for PT evaluation s/p admit to [facility] on 7/13/2024 w/ Aspiration pneumonia (HCC). PT consulted to assess pt's functional mobility and d/c needs. Order placed for PT eval and tx, w/ up and OOB as tolerated order. Pt agreeable to PT  session upon arrival, pt found seated OOB in recliner.  PTA, pt was requiring A for mobility.  Pt to benefit from continued PT tx to address deficits and maximize level of functional independent mobility and consistency. Upon conclusion pt  seated in recliner. Complexity: Comorbidities affecting pt's physical performance at time of assessment include: dementia and hard of hearing. Personal factors affecting pt at time of IE include: advanced age, limited mobility, history of falls, limited insight into impairments, and decreased cognition. Please find objective findings from PT assessment regarding body systems outlined above with impairments and limitations including impaired balance, gait deviations, decreased safety awareness, impaired judgement, fall risk, and decreased cognition.  Pt's clinical presentation is currently unstable/unpredictable seen in pt's presentation of confusion and impulsivity during admission. The patient's AM-PAC Basic Mobility Inpatient Short Form Raw Score is  10.  Based on patient presentations and impairments, pt would most appropriately benefit from Level 2 resource intensity upon discharge. Please also refer to the  recommendation of the Physical Therapist for safe discharge planning. RN verbalized pt appropriate for PT session. Pt seen as a co-eval with OT due to the patient's co-morbidities, clinically unstable presentation, and present impairments which are a regression from the patient's baseline.        Rehab Resource Intensity Level, PT: II (Moderate Resource Intensity)    See flowsheet documentation for full assessment.

## 2024-07-15 NOTE — ASSESSMENT & PLAN NOTE
Initial CT head-no acute pathology  Acute toxic metabolic encephalopathy is probably secondary to an infectious etiology (aspiration pneumonia versus UTI) and progression of dementia.  Continue close monitoring  Patient is otherwise calm and cooperative at this time

## 2024-07-15 NOTE — SPEECH THERAPY NOTE
Speech Language/Pathology  Speech/Language Pathology  Assessment    Patient Name: Thomas Chase  Today's Date: 7/15/2024     Problem List  Principal Problem:    Aspiration pneumonia (HCC)  Active Problems:    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HCC)    Diastolic dysfunction    Sensorineural hearing loss (SNHL), bilateral    H/O traumatic subdural hematoma    Dysphagia    Bilateral lower extremity weakness    Benign prostatic hyperplasia without lower urinary tract symptoms    Acute metabolic encephalopathy    UTI (urinary tract infection)    Past Medical History  Past Medical History:   Diagnosis Date    Abnormal echocardiogram 06/27/2022    Arthritis     EKG abnormalities 06/19/2022    Encounter for general adult medical examination without abnormal findings 03/20/2019    Fall 11/03/2022    Hearing loss 04/08/2009    Hyperlipidemia     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    Macular degeneration, wet (HCC)     Urinary retention 06/02/2022     Past Surgical History  Past Surgical History:   Procedure Laterality Date    BRAIN HEMATOMA EVACUATION Left 05/28/2022    Procedure: left CRANIOTOMY FOR SUBDURAL HEMATOMA;  Surgeon: Chris Watts MD;  Location: [facility];  Service: Neurosurgery    CARPAL TUNNEL RELEASE      HERNIA REPAIR Right     inguinal    IR CEREBRAL ANGIOGRAPHY / INTERVENTION  06/02/2022    SC COCHLEAR DEVICE IMPLANTATION W/WO MASTOIDECTOMY Left 1/17/2023    Procedure: LEFT COCHLEAR IMPLANTATION WITH MASTOIDECTOMY AND FACIAL NERVE MONITORING WITH AUDIOMETRIC TESTING OF THE IMPLANT;  Surgeon: Susie Tuttle MD;  Location: [facility];  Service: ENT    SC NEUROPLASTY &/TRANSPOS MEDIAN NRV CARPAL TUNNE Right 09/20/2021    Procedure: RELEASE CARPAL TUNNEL;  Surgeon: Bruno Segovia MD;  Location: [facility];  Service: Orthopedics      Bedside Swallow Evaluation:    Summary:  Pt presented w/ mild oral and suspect mod pharyngeal dysphagia. Well known to this ST.  Positioned upright and OOB in  recliner. Pt fed by ST as hx of impulsivity. Trialed puree, ntl, and honey thick liquids via cup. Bolus control, formation, and transfer appeared prompt. Laryngeal rise upon palpation appeared adequate. Pt x3 immediate harsh cough following trials of ntl. No overt s/s of aspiration with honey thick. Spoke with spouse at beside. Reported at home pt tolerating pureed consistency. She did state occassional coughing with thickened liquids which she suspected as not thick enough when added additional thickener no cough. ST reviewed current diet recommendations, plan for repeat VBS tomorrow as appropriate, and safe swallow strategies, spouse understood.     Recommendations:  Diet: Dysphagia 1 pureed   Liquid: Honey thick   Meds:As tolerated   Supervision:Full  Positioning:Upright  Strategies: slow rate, alternate liquids solids, swallow prior to additional po    Pt to take PO/Meds only when fully alert and upright.   Oral care:  Aspiration precautions  Reflux precautions  Therapy Prognosis: guarded  Prognosis considerations:age, medical status  Frequency:2-5 times weekly as indicated    Consider consult w/:  Rehab  Nutrition pt hx of poor intake and dysphagia, suspect if oral intake insufficient may benefit from alternate means of nutrition.     Goal(s):  Dysphagia LTG  -Patient will demonstrate safe and effective oral intake (without overt s/s significant oral/pharyngeal dysphagia including s/s penetration or aspiration) for the highest appropriate diet level.     1.Pt will tolerate least restrictive diet w/out s/s aspiration or oral/pharyngeal difficulties.   2.Pt will will effectively manipulate/masticate and transfer purees w/out s/s dysphagia/aspiration.   3.Pt will tolerate thin liquids w/out s/s aspiration.   -If indicated, patient will comply with a Video/Modified Barium Swallow study for more complete assessment of swallowing anatomy/physiology/aspiration risk and to assess efficacy of treatment techniques so as  to best guide treatment plan     H&P/Admit info/ pertinent provider notes: (PMH noted above)  Thomas Chase is a 83 y.o. male with a PMH of vascular dementia, severe protein calorie malnutrition, stents, insomnia, peripheral vascular disease, prior history of aspiration of right lower lobe pneumonia, prior subdural hematoma: February 2024 s/p craniotomy 2 years ago, dysphagia, hearing impairment who presents with family, with a chief complaint two days  of generalized increase weakness, increased confusion with anxiety and balance issues.  Unfortunately patient is unable to provide any information.   Patient's daughter and wife at the bedside states that the patient has been noticed more disoriented than at his baseline with unbalanced gait.  He appeared very weak to the point where 2 people could not hold him.  They noticed patient was coughing more than usual.  Family reports history of dysphagia. Patient also taking Bactrim for UTI.   Family reports having increasing challenges taking care of the patient at home who has 24-hour care concerned that they are unable to take care  and ask in  assistance in placement of the patient in a skilled nursing facility no recent falls.    Patient does not normally speak is unable to communicate effectively, communicates by writing on the board, is severely hearing impaired.  Chest x-ray showed right lower lobe pneumonia.  Labs unrevealing.  On my evaluation patient appears not in acute distress, hemodynamically stable.    Family denies any fevers, nausea, vomiting, diarrhea.    Special Studies:  CT BRAIN - WITHOUT CONTRAST 07/13/2024  No acute intracranial abnormality. Chronic microangiopathic changes.   XR CHEST PORTABLE 07/13/2024  Mild right base opacity which could be due to pneumonia.       Procalcitonin: 0.05                   07/13/2024   WBC: 9.61                     07/15/2024     Code Status Level 1 full code     Previous MBS:  Video Swallow Study  3/11/2024    Summary:  Images are on PACS for review.   Oral Phase : Pt presented with mild oral dysphagia. Bolus control, formation, and transfer were mildly reduced. Mild premature spill occurred. Trace posterior base of tongue residue.   Pharyngeal Phase : Pt presented with mod/severe pharyngeal dysphagia. Swallow initiation was delayed. Premature spillage occurred to the valleculae with all trials. With trials of thin liquids premature spill occurred to the pyriforms. Base of tongue retraction was reduced. Hyoid laryngeal elevation was variable WFL vs mild/mod reduced. Pharyngeal constriction and air way closure were reduced. Epiglottic inversion was incomplete. Mod to Max vallecular retention occurred with all consistencies. Pyriform retention occurred with all consistencies.   Phillip aspiration occurred during and after the swallow with thin liquids via cup/straw. Pt had no sensation of aspiration events. Increased difficulty following verbal and written commands due to cognitive status and [place].  Cued cough appeared weak and ineffective in clearing aspirated residue. Pt will independently initiate multiple swallows (3-4) to clear residue however min successful in clearing residue.  Per Esophageal screen   All material cleared adequately   Observations: Pt [place] and hx of cognitive deficits. ST provided verbal and written directions however increased difficulty following. ST did administer solids or 13mm pill as safety concern.   Recommendations:  Diet: Dysphagia 1 pureed   Liquids: Nectar   Strategies: slow rate, small sips, alternate liquids with solids, double swallow and effortful swallow.   Upright position  F/u ST tx: yes  Full/Supervision  Aspiration Precautions  Reflux Precautions  Consider consult with: Nutrition   Results reviewed with: pt, OP SLP  Aspiration precautions posted.  If a dedicated assessment of the esophagus is desired, consider esophagram/barium swallow or EGD.     Patient's goal: none stated     Did  the pt report pain? no  If yes, was nursing notified/was it addressed? N/a    Reason for consult:  R/o aspiration  Determine safest and least restrictive diet   poor intake  weight loss   h/o dysphagia   C/o solid food dysphagia  C/o liquid dysphagia    Precautions:  Aspiration  Fall    Food Allergies: No known   Current Diet: Dysphagia 1 pureed and ntl   Premorbid diet: Dysphagia 1 pureed and ntl   O2 requirement: Room air   Social/Prior living Lives with family   Voice/Speech: WNL   Follows commands: Basic    Cognitive status: Alert     Oral [facility] exam:  Dentition: Adequate   Lips (VII):WNL  Tongue (XII):midline   Mandible (V):adequate ROM  Face/oral sensation (V):symmetrical   Velum (X):WNL    Items administered:  Puree, ntl and honey thick     Oral stage:  Lip closure: WNL   Mastication: DNT  Bolus formation:WNL  Bolus control:WNL  Transfer:WNL    Pharyngeal stage:  Swallow promptness: prompt to min delayed   Laryngeal rise:adequate  Cough: present with NTL harsh successive     Esophageal stage:  No s/s reported  H/o GERD    Aspiration precautions posted    Results d/w:  Pt, nursing, family, physician,

## 2024-07-15 NOTE — ASSESSMENT & PLAN NOTE
Initial chest x-ray was concerning for right lower lobe pneumonia   Concern is for the possibility of an aspiration event, or ongoing chronic microaspiration  Tmax overnight 101.3 °F  Testing for COVID-19, influenza, and respiratory syncytial virus is negative  Blood cultures x 2 sets have been collected and are in progress  Patient was initially put on ceftriaxone, and Flagyl -will simplify regimen to Augmentin  PT, OT, and speech consult is pending  Blood work is reassuring this a.m.  Start discharge planning

## 2024-07-15 NOTE — PROGRESS NOTES
[facility]  Progress Note  Name: Thomas Chase I  MRN: [id number]  Unit/Bed#: -01 I Date of Admission: 7/13/2024   Date of Service: 7/15/2024 I Hospital Day: 1    Assessment & Plan   * Aspiration pneumonia (HCC)  Assessment & Plan  Initial chest x-ray was concerning for right lower lobe pneumonia   Concern is for the possibility of an aspiration event, or ongoing chronic microaspiration  Tmax overnight 101.3 °F  Testing for COVID-19, influenza, and respiratory syncytial virus is negative  Blood cultures x 2 sets have been collected and are in progress  Patient was initially put on ceftriaxone, and Flagyl -will simplify regimen to Augmentin  PT, OT, and speech consult is pending  Blood work is reassuring this a.m.  Start discharge planning    Dysphagia  Assessment & Plan  Formal speech evaluation is pending  In the interim, continue a puréed/nectar thick liquid diet    Acute metabolic encephalopathy  Assessment & Plan  Initial CT head-no acute pathology  Acute toxic metabolic encephalopathy is probably secondary to an infectious etiology (aspiration pneumonia versus UTI) and progression of dementia.  Continue close monitoring  Patient is otherwise calm and cooperative at this time    UTI (urinary tract infection)  Assessment & Plan  Patient was recently diagnosed with a Proteus mirabilis UTI on 7/5/2024  Patient was taking Bactrim at home  DC ceftriaxone, and Flagyl as outlined above, will switch to Augmentin which will cover both the possibility of an aspiration event, and his Proteus mirabilis UTI based on prior sensitivities    Diastolic dysfunction  Assessment & Plan  Without exacerbation  Patient examines euvolemic, continue close monitoring  Continue statin    Bilateral lower extremity weakness  Assessment & Plan  Formal PT and OT evaluation is pending  Clinically suspect that the patient might benefit from rehab  Of note, the patient's family are interested in the  same    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HCC)  Assessment & Plan  CT head with no acute changes consistent with this, chronic vascular changes  Continue sertraline otherwise    H/O traumatic subdural hematoma  Assessment & Plan  No acute changes on imaging  Continue monitoring    Benign prostatic hyperplasia without lower urinary tract symptoms  Assessment & Plan  Continue tamsulosin    Sensorineural hearing loss (SNHL), bilateral  Assessment & Plan  Patient is very hard of hearing      H/O orthostatic hypotension  Assessment & Plan  Continue midodrine               VTE Pharmacologic Prophylaxis: VTE Score: 5 High Risk (Score >/= 5) - Pharmacological DVT Prophylaxis Ordered: enoxaparin (Lovenox). Sequential Compression Devices Ordered.    Mobility:   Basic Mobility Inpatient Raw Score: 12  JH-HLM Goal: 4: Move to chair/commode  JH-HLM Achieved: 4: Move to chair/commode  JH-HLM Goal NOT achieved. Continue with multidisciplinary rounding and encourage appropriate mobility to improve upon JH-HLM goals.    Patient Centered Rounds: I performed bedside rounds with nursing staff today.   Discussions with Specialists or Other Care Team Provider: Case management, speech therapy, physical and Occupational Therapy    Education and Discussions with Family / Patient: Attempted to update  (daughter) via phone. Unable to contact.    Total Time Spent on Date of Encounter in care of patient: 35 mins. This time was spent on one or more of the following: performing physical exam; counseling and coordination of care; obtaining or reviewing history; documenting in the medical record; reviewing/ordering tests, medications or procedures; communicating with other healthcare professionals and discussing with patient's family/caregivers.    Current Length of Stay: 1 day(s)  Current Patient Status: Inpatient   Certification Statement: The patient will continue to require additional inpatient hospital stay due to need for antibiotics,  and placement  Discharge Plan: Anticipate discharge in 48-72 hrs to discharge location to be determined pending rehab evaluations.    Code Status: Level 1 - Full Code    Subjective:   Patient seen, sitting up in bed, is extremely hard of hearing, is finishing up breakfast, and appears comfortable.    Objective:     Vitals:   Temp (24hrs), Av.7 °F (37.6 °C), Min:98.9 °F (37.2 °C), Max:101.3 °F (38.5 °C)    Temp:  [98.9 °F (37.2 °C)-101.3 °F (38.5 °C)] 98.9 °F (37.2 °C)  HR:  [] 104  Resp:  [18] 18  BP: (120-140)/(60-95) 120/60  SpO2:  [92 %-95 %] 93 %  Body mass index is 19.49 kg/m².     Input and Output Summary (last 24 hours):     Intake/Output Summary (Last 24 hours) at 7/15/2024 0912  Last data filed at 7/15/2024 0601  Gross per 24 hour   Intake 1640 ml   Output 1400 ml   Net 240 ml       Physical Exam:   Physical Exam  Vitals and nursing note reviewed.   Constitutional:       General: He is not in acute distress.     Appearance: He is not ill-appearing.      Comments: Thin, frail, elderly   HENT:      Head: Normocephalic and atraumatic.      Comments: Extremely hard of hearing     Nose: Nose normal.   Eyes:      Extraocular Movements: Extraocular movements intact.      Pupils: Pupils are equal, round, and reactive to light.   Cardiovascular:      Rate and Rhythm: Normal rate and regular rhythm.      Pulses: Normal pulses.      Heart sounds: Normal heart sounds. No murmur heard.     No friction rub. No gallop.   Pulmonary:      Effort: Pulmonary effort is normal.      Breath sounds: Normal breath sounds.   Abdominal:      General: There is no distension.      Palpations: Abdomen is soft. There is no mass.      Tenderness: There is no abdominal tenderness. There is no guarding or rebound.   Musculoskeletal:         General: No swelling or tenderness. Normal range of motion.      Cervical back: Normal range of motion and neck supple. No rigidity. No muscular tenderness.      Right lower leg: No edema.       Left lower leg: No edema.   Skin:     General: Skin is warm.      Capillary Refill: Capillary refill takes less than 2 seconds.      Findings: No erythema or rash.   Neurological:      General: No focal deficit present.      Mental Status: He is alert and oriented to person, place, and time. Mental status is at baseline.   Psychiatric:         Mood and Affect: Mood normal.         Behavior: Behavior normal.          Additional Data:     Labs:  Results from last 7 days   Lab Units 07/15/24  0436 07/14/24 0455 07/13/24 2120   WBC Thousand/uL 9.61   < > 8.39   HEMOGLOBIN g/dL 12.0   < > 11.6*   HEMATOCRIT % 37.4   < > 36.0*   PLATELETS Thousands/uL 425*   < > 429*   SEGS PCT %  --   --  77*   LYMPHO PCT %  --   --  14   MONO PCT %  --   --  8   EOS PCT %  --   --  1    < > = values in this interval not displayed.     Results from last 7 days   Lab Units 07/15/24  0436 07/14/24 0455 07/13/24 2120   SODIUM mmol/L 136   < > 137   POTASSIUM mmol/L 4.1   < > 4.7   CHLORIDE mmol/L 103   < > 104   CO2 mmol/L 26   < > 27   BUN mg/dL 15   < > 21   CREATININE mg/dL 0.71   < > 0.82   ANION GAP mmol/L 7   < > 6   CALCIUM mg/dL 9.0   < > 9.0   ALBUMIN g/dL  --   --  3.6   TOTAL BILIRUBIN mg/dL  --   --  0.29   ALK PHOS U/L  --   --  93   ALT U/L  --   --  20   AST U/L  --   --  19   GLUCOSE RANDOM mg/dL 107   < > 93    < > = values in this interval not displayed.     Results from last 7 days   Lab Units 07/13/24 2120   INR  1.04     Results from last 7 days   Lab Units 07/15/24  0737   POC GLUCOSE mg/dl 111         Results from last 7 days   Lab Units 07/13/24  2244 07/13/24 2120   LACTIC ACID mmol/L 0.7  --    PROCALCITONIN ng/ml  --  <0.05       Lines/Drains:  Invasive Devices       Peripheral Intravenous Line  Duration             Peripheral IV 07/13/24 Dorsal (posterior);Left Forearm 1 day                          Imaging: Reviewed radiology reports from this admission including: chest xray    Recent Cultures (last 7  days):   Results from last 7 days   Lab Units 07/13/24  2244   BLOOD CULTURE  Received in Microbiology Lab. Culture in Progress.  Received in Microbiology Lab. Culture in Progress.       Last 24 Hours Medication List:   Current Facility-Administered Medications   Medication Dose Route Frequency Provider Last Rate    acetaminophen  650 mg Oral Q6H PRN Mary Lou Mobley MD      amoxicillin-clavulanate  1 tablet Oral Q12H [facility] Tameka Zamora MD      enoxaparin  40 mg Subcutaneous Daily Mary Lou Mobley MD      LORazepam  0.25 mg Oral Q8H PRN Mary Lou Mobley MD      melatonin  9 mg Oral HS Mary Lou Mobley MD      midodrine  5 mg Oral TID AC Mary Lou Mobley MD      polyethylene glycol  17 g Oral Daily PRN Trista Mims DO      pravastatin  40 mg Oral Daily With Dinner Mary Lou Mobley MD      senna  8.6 mg Oral HS Mary Lou Mobley MD      sertraline  37.5 mg Oral Daily Mary Lou Mobley MD      tamsulosin  0.8 mg Oral Daily With Dinner Mary Lou Mobley MD          Today, Patient Was Seen By: Tameka Zamora MD    **Please Note: This note may have been constructed using a voice recognition system.**

## 2024-07-15 NOTE — ASSESSMENT & PLAN NOTE
Formal PT and OT evaluation is pending  Clinically suspect that the patient might benefit from rehab  Of note, the patient's family are interested in the same

## 2024-07-15 NOTE — ASSESSMENT & PLAN NOTE
CT head with no acute changes consistent with this, chronic vascular changes  Continue sertraline otherwise

## 2024-07-15 NOTE — PLAN OF CARE
Problem: Potential for Falls  Goal: Patient will remain free of falls  Description: INTERVENTIONS:  - Educate patient/family on patient safety including physical limitations  - Instruct patient to call for assistance with activity   - Consult OT/PT to assist with strengthening/mobility   - Keep Call bell within reach  - Keep bed low and locked with side rails adjusted as appropriate  - Keep care items and personal belongings within reach  - Initiate and maintain comfort rounds  - Make Fall Risk Sign visible to staff  - Offer Toileting every 2-4  Hours, in advance of need  - Initiate/Maintain bed alarm  - Obtain necessary fall risk management equipment:   - Apply yellow socks and bracelet for high fall risk patients  - Consider moving patient to room near nurses station  7/15/2024 0113 by Gwen Bernal, MERY  Outcome: Progressing  7/15/2024 0110 by Gwen Bernal, RN  Outcome: Progressing  7/14/2024 2055 by Gwen Bernal, RN  Outcome: Progressing

## 2024-07-15 NOTE — PLAN OF CARE
Problem: SAFETY ADULT  Goal: Patient will remain free of falls  Description: INTERVENTIONS:  - Educate patient/family on patient safety including physical limitations  - Instruct patient to call for assistance with activity   - Consult OT/PT to assist with strengthening/mobility   - Keep Call bell within reach  - Keep bed low and locked with side rails adjusted as appropriate  - Keep care items and personal belongings within reach  - Initiate and maintain comfort rounds  - Make Fall Risk Sign visible to staff  - Offer Toileting every 2 Hours, in advance of need  - Initiate/Maintain bed and chair alarm  - Obtain necessary fall risk management equipment: non slip socks  - Apply yellow socks and bracelet for high fall risk patients  - Consider moving patient to room near nurses station  Outcome: Progressing     Problem: SAFETY ADULT  Goal: Maintain or return to baseline ADL function  Description: INTERVENTIONS:  -  Assess patient's ability to carry out ADLs; assess patient's baseline for ADL function and identify physical deficits which impact ability to perform ADLs (bathing, care of mouth/teeth, toileting, grooming, dressing, etc.)  - Assess/evaluate cause of self-care deficits   - Assess range of motion  - Assess patient's mobility; develop plan if impaired  - Assess patient's need for assistive devices and provide as appropriate  - Encourage maximum independence but intervene and supervise when necessary  - Involve family in performance of ADLs  - Assess for home care needs following discharge   - Consider OT consult to assist with ADL evaluation and planning for discharge  - Provide patient education as appropriate  Outcome: Progressing

## 2024-07-15 NOTE — PLAN OF CARE
Problem: Potential for Falls  Goal: Patient will remain free of falls  Description: INTERVENTIONS:  - Educate patient/family on patient safety including physical limitations  - Instruct patient to call for assistance with activity   - Consult OT/PT to assist with strengthening/mobility   - Keep Call bell within reach  - Keep bed low and locked with side rails adjusted as appropriate  - Keep care items and personal belongings within reach  - Initiate and maintain comfort rounds  - Make Fall Risk Sign visible to staff  - Offer Toileting every 2 Hours, in advance of need  - Initiate/Maintain BED alarm  - Obtain necessary fall risk management equipment:   - Apply yellow socks and bracelet for high fall risk patients  - Consider moving patient to room near nurses station  7/15/2024 0110 by Gwen Bernal RN  Outcome: Progressing  7/14/2024 2055 by Gwen Bernal RN  Outcome: Progressing     Problem: Prexisting or High Potential for Compromised Skin Integrity  Goal: Skin integrity is maintained or improved  Description: INTERVENTIONS:  - Identify patients at risk for skin breakdown  - Assess and monitor skin integrity  - Assess and monitor nutrition and hydration status  - Monitor labs   - Assess for incontinence   - Turn and reposition patient  - Assist with mobility/ambulation  - Relieve pressure over bony prominences  - Avoid friction and shearing  - Provide appropriate hygiene as needed including keeping skin clean and dry  - Evaluate need for skin moisturizer/barrier cream  - Collaborate with interdisciplinary team   - Patient/family teaching  - Consider wound care consult   7/15/2024 0110 by Gwen Bernal RN  Outcome: Progressing  7/14/2024 2055 by Gwen Bernal RN  Outcome: Progressing     Problem: PAIN - ADULT  Goal: Verbalizes/displays adequate comfort level or baseline comfort level  Description: Interventions:  - Encourage patient to monitor pain and request assistance  - Assess pain  using appropriate pain scale  - Administer analgesics based on type and severity of pain and evaluate response  - Implement non-pharmacological measures as appropriate and evaluate response  - Consider cultural and social influences on pain and pain management  - Notify physician/advanced practitioner if interventions unsuccessful or patient reports new pain  7/15/2024 0110 by Gwen Bernal RN  Outcome: Progressing  7/14/2024 2055 by Gwen Bernal RN  Outcome: Progressing     Problem: INFECTION - ADULT  Goal: Absence or prevention of progression during hospitalization  Description: INTERVENTIONS:  - Assess and monitor for signs and symptoms of infection  - Monitor lab/diagnostic results  - Monitor all insertion sites, i.e. indwelling lines, tubes, and drains  - Monitor endotracheal if appropriate and nasal secretions for changes in amount and color  - [place] appropriate cooling/warming therapies per order  - Administer medications as ordered  - Instruct and encourage patient and family to use good hand hygiene technique  - Identify and instruct in appropriate isolation precautions for identified infection/condition  7/15/2024 0110 by Gwen Bernal RN  Outcome: Progressing  7/14/2024 2055 by Gwen Bernal RN  Outcome: Progressing  Goal: Absence of fever/infection during neutropenic period  Description: INTERVENTIONS:  - Monitor WBC    7/15/2024 0110 by Gwen Bernal RN  Outcome: Progressing  7/14/2024 2055 by Gwen Bernal RN  Outcome: Progressing     Problem: SAFETY ADULT  Goal: Patient will remain free of falls  Description: INTERVENTIONS:  - Educate patient/family on patient safety including physical limitations  - Instruct patient to call for assistance with activity   - Consult OT/PT to assist with strengthening/mobility   - Keep Call bell within reach  - Keep bed low and locked with side rails adjusted as appropriate  - Keep care items and personal belongings within  reach  - Initiate and maintain comfort rounds  - Make Fall Risk Sign visible to staff  - Offer Toileting every 2-4  Hours, in advance of need  - Initiate/Maintain bed alarm  - Obtain necessary fall risk management equipment:   - Apply yellow socks and bracelet for high fall risk patients  - Consider moving patient to room near nurses station  7/15/2024 0110 by Gwen Bernal RN  Outcome: Progressing  7/14/2024 2055 by Gwen Bernal RN  Outcome: Progressing  Goal: Maintain or return to baseline ADL function  Description: INTERVENTIONS:  -  Assess patient's ability to carry out ADLs; assess patient's baseline for ADL function and identify physical deficits which impact ability to perform ADLs (bathing, care of mouth/teeth, toileting, grooming, dressing, etc.)  - Assess/evaluate cause of self-care deficits   - Assess range of motion  - Assess patient's mobility; develop plan if impaired  - Assess patient's need for assistive devices and provide as appropriate  - Encourage maximum independence but intervene and supervise when necessary  - Involve family in performance of ADLs  - Assess for home care needs following discharge   - Consider OT consult to assist with ADL evaluation and planning for discharge  - Provide patient education as appropriate  7/15/2024 0110 by Gwen Bernal RN  Outcome: Progressing  7/14/2024 2055 by Gwen Bernal RN  Outcome: Progressing  Goal: Maintains/Returns to pre admission functional level  Description: INTERVENTIONS:  - Perform AM-PAC 6 Click Basic Mobility/ Daily Activity assessment daily.  - Set and communicate daily mobility goal to care team and patient/family/caregiver.   - Collaborate with rehabilitation services on mobility goals if consulted  - Perform Range of Motion 2  times a d  - Reposition patient every 2  hours.  -ATTEMPT  Stand patient 2 times a day  - Ambulate patient 2 times a day AS TOLERATED   - Out of bed to chair 2 times a day AS TOLERAT  - Out  of bed for toileting  - Record patient progress and toleration of activity level   7/15/2024 0110 by Gwen Bernal RN  Outcome: Progressing  7/14/2024 2055 by Gwen Bernal RN  Outcome: Progressing     Problem: DISCHARGE PLANNING  Goal: Discharge to home or other facility with appropriate resources  Description: INTERVENTIONS:  - Identify barriers to discharge w/patient and caregiver  - Arrange for needed discharge resources and transportation as appropriate  - Identify discharge learning needs (meds, wound care, etc.)  - Arrange for interpretive services to assist at discharge as needed  - Refer to Case Management Department for coordinating discharge planning if the patient needs post-hospital services based on physician/advanced practitioner order or complex needs related to functional status, cognitive ability, or social support system  7/15/2024 0110 by Gwen Bernal RN  Outcome: Progressing  7/14/2024 2055 by Gwen Bernal RN  Outcome: Progressing     Problem: Knowledge Deficit  Goal: Patient/family/caregiver demonstrates understanding of disease process, treatment plan, medications, and discharge instructions  Description: Complete learning assessment and assess knowledge base.  Interventions:  - Provide teaching at level of understanding  - Provide teaching via preferred learning methods  7/15/2024 0110 by Gwen Bernal RN  Outcome: Progressing  7/14/2024 2055 by Gwen Bernal RN  Outcome: Progressing     Problem: GENITOURINARY - ADULT  Goal: Maintains or returns to baseline urinary function  Description: INTERVENTIONS:  - Assess urinary function  - Encourage oral fluids to ensure adequate hydration if ordered  - Administer IV fluids as ordered to ensure adequate hydration  - Administer ordered medications as needed  - Offer frequent toileting  - Follow urinary retention protocol if ordered  7/15/2024 0110 by Gwen Bernal RN  Outcome: Progressing  7/14/2024 2055 by  Gwen Bernal RN  Outcome: Progressing  Goal: Absence of urinary retention  Description: INTERVENTIONS:  - Assess patient’s ability to void and empty bladder  - Monitor I/O  - Bladder scan as needed  - Discuss with physician/AP medications to alleviate retention as needed  - Discuss catheterization for long term situations as appropriate  7/15/2024 0110 by Gwen Bernal RN  Outcome: Progressing  7/14/2024 2055 by Gwne Bernal RN  Outcome: Progressing     Problem: METABOLIC, FLUID AND ELECTROLYTES - ADULT  Goal: Electrolytes maintained within normal limits  Description: INTERVENTIONS:  - Monitor labs and assess patient for signs and symptoms of electrolyte imbalances  - Administer electrolyte replacement as ordered  - Monitor response to electrolyte replacements, including repeat lab results as appropriate  - Instruct patient on fluid and nutrition as appropriate  7/15/2024 0110 by Gwen Bernal RN  Outcome: Progressing  7/14/2024 2055 by Gwen Bernal RN  Outcome: Progressing  Goal: Fluid balance maintained  Description: INTERVENTIONS:  - Monitor labs   - Monitor I/O and WT  - Instruct patient on fluid and nutrition as appropriate  - Assess for signs & symptoms of volume excess or deficit  7/15/2024 0110 by Gwen Bernal RN  Outcome: Progressing  7/14/2024 2055 by Gwen Bernal RN  Outcome: Progressing  Goal: Glucose maintained within target range  Description: INTERVENTIONS:  - Monitor Blood Glucose as ordered  - Assess for signs and symptoms of hyperglycemia and hypoglycemia  - Administer ordered medications to maintain glucose within target range  - Assess nutritional intake and initiate nutrition service referral as needed  7/15/2024 0110 by Gwen Bernal RN  Outcome: Progressing  7/14/2024 2055 by Gwen Bernal RN  Outcome: Progressing     Problem: MUSCULOSKELETAL - ADULT  Goal: Maintain or return mobility to safest level of function  Description:  INTERVENTIONS:  - Assess patient's ability to carry out ADLs; assess patient's baseline for ADL function and identify physical deficits which impact ability to perform ADLs (bathing, care of mouth/teeth, toileting, grooming, dressing, etc.)  - Assess/evaluate cause of self-care deficits   - Assess range of motion  - Assess patient's mobility  - Assess patient's need for assistive devices and provide as appropriate  - Encourage maximum independence but intervene and supervise when necessary  - Involve family in performance of ADLs  - Assess for home care needs following discharge   - Consider OT consult to assist with ADL evaluation and planning for discharge  - Provide patient education as appropriate  7/15/2024 0110 by Gwen Bernal, MERY  Outcome: Progressing  7/14/2024 2055 by Gwen Bernal RN  Outcome: Progressing     Problem: Nutrition/Hydration-ADULT  Goal: Nutrient/Hydration intake appropriate for improving, restoring or maintaining nutritional needs  Description: Monitor and assess patient's nutrition/hydration status for malnutrition. Collaborate with interdisciplinary team and initiate plan and interventions as ordered.  Monitor patient's weight and dietary intake as ordered or per policy. Utilize nutrition screening tool and intervene as necessary. Determine patient's food preferences and provide high-protein, high-caloric foods as appropriate.     INTERVENTIONS:  - Monitor oral intake, urinary output, labs, and treatment plans  - Assess nutrition and hydration status and recommend course of action  - Evaluate amount of meals eaten  - Assist patient with eating if necessary   - Allow adequate time for meals  - Recommend/ encourage appropriate diets, oral nutritional supplements, and vitamin/mineral supplements  - Order, calculate, and assess calorie counts as needed  - Recommend, monitor, and adjust tube feedings and TPN/PPN based on assessed needs  - Assess need for intravenous fluids  - Provide  specific nutrition/hydration education as appropriate  - Include patient/family/caregiver in decisions related to nutrition  7/15/2024 0110 by Gwen Bernal, RN  Outcome: Progressing  7/14/2024 2055 by Gwen Bernal, RN  Outcome: Progressing

## 2024-07-15 NOTE — PHYSICAL THERAPY NOTE
PHYSICAL THERAPY EVALUATION  NAME:  Thomas Chase  DATE: 07/15/24    AGE:   83 y.o.  Mrn:   [id number]  ADMIT DX:  Confusion [R41.0]  Pneumonia [J18.9]  Hearing impaired person, bilateral [H91.93]  Problem List:   Patient Active Problem List   Diagnosis    Hypercholesterolemia    Borderline hypertension    Negative depression screening    Overweight (BMI 25.0-29.9)    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HCC)    Right hand pain    Carpal tunnel syndrome on right    Hygroma    Primary osteoarthritis of left knee    SDH (subdural hematoma) (HCC)    Constipation due to neurogenic bowel    Urinary retention    Low BP    Diastolic dysfunction    Right bundle-branch block    Balance disorder    Sensorineural hearing loss (SNHL), bilateral    Macular degeneration, age related    H/O traumatic subdural hematoma    Pharyngeal myoclonus    Dysphagia    Debility    Severe protein-calorie malnutrition (HCC)    Abnormal CT scan, lumbar spine    Bilateral lower extremity weakness    Headache    Behavior safety risk    Hematuria    Insomnia    Abnormal CT of the head    Abnormal urinalysis    Benign prostatic hyperplasia without lower urinary tract symptoms    Peripheral vascular disease, unspecified (HCC)    Aspiration pneumonia (HCC)    Acute metabolic encephalopathy    UTI (urinary tract infection)    H/O orthostatic hypotension       Past Medical History  Past Medical History:   Diagnosis Date    Abnormal echocardiogram 06/27/2022    Arthritis     EKG abnormalities 06/19/2022    Encounter for general adult medical examination without abnormal findings 03/20/2019    Fall 11/03/2022    Hearing loss 04/08/2009    Hyperlipidemia     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    Macular degeneration, wet (HCC)     Urinary retention 06/02/2022       Past Surgical History  Past Surgical History:   Procedure Laterality Date    BRAIN HEMATOMA EVACUATION Left 05/28/2022    Procedure: left CRANIOTOMY FOR SUBDURAL HEMATOMA;  Surgeon: Chris  Lv Watts MD;  Location:  MAIN OR;  Service: Neurosurgery    CARPAL TUNNEL RELEASE      HERNIA REPAIR Right     inguinal    IR CEREBRAL ANGIOGRAPHY / INTERVENTION  06/02/2022    ND COCHLEAR DEVICE IMPLANTATION W/WO MASTOIDECTOMY Left 1/17/2023    Procedure: LEFT COCHLEAR IMPLANTATION WITH MASTOIDECTOMY AND FACIAL NERVE MONITORING WITH AUDIOMETRIC TESTING OF THE IMPLANT;  Surgeon: Susie Tuttle MD;  Location: [facility];  Service: ENT    ND NEUROPLASTY &/TRANSPOS MEDIAN NRV CARPAL TUNNE Right 09/20/2021    Procedure: RELEASE CARPAL TUNNEL;  Surgeon: Bruno Segovia MD;  Location: [facility];  Service: Orthopedics       Length Of Stay: 1  Performed at least 2 patient identifiers during session: Name and ID bracelet       07/15/24 1111   PT Last Visit   PT Visit Date 07/15/24   Note Type   Note type Evaluation   Pain Assessment   Pain Assessment Tool 0-10   Pain Score No Pain   Restrictions/Precautions   Weight Bearing Precautions Per Order No   Other Precautions Fall Risk;Hard of hearing;Cognitive;Chair Alarm;Telemetry;Impulsive;Aspiration   Home Living   Type of Home House   Home Layout Multi-level;Performs ADLs on one level;Able to live on main level with bedroom/bathroom;Stairs to enter with rails  ((1 HATTIE))   Bathroom Shower/Tub Walk-in shower   Bathroom Toilet Standard   Bathroom Equipment Grab bars in shower;Grab bars around toilet;Shower chair   Home Equipment Walker;Wheelchair-manual  (RW for mobility)   Prior Function   Level of [place] Needs assistance with ADLs;Needs assistance with functional mobility;Needs assistance with IADLS   Lives With Spouse   Receives Help From Family;Home health  (24 hour HHA)   IADLs Family/Friend/Other provides transportation;Family/Friend/Other provides meals;Family/Friend/Other provides medication management   Falls in the last 6 months   (at least 2)   Vocational Retired   Comments information obtained via chart review and visitor   General    Family/Caregiver Present Yes  (assisted with obtaining information)   Cognition   Overall Cognitive Status Impaired   Arousal/Participation Alert   Attention Difficulty attending to directions   Orientation Level Unable to assess   Memory Decreased recall of biographical information;Decreased long term memory;Decreased short term memory;Decreased recall of recent events;Decreased recall of precautions   Following Commands Unable to follow one step commands   RLE Assessment   RLE Assessment WFL   LLE Assessment   LLE Assessment WFL   Transfers   Sit to Stand   (CGA)   Additional items Impulsive   Stand to Sit 3  Moderate assistance   Additional items Assist x 2;Impulsive  (Prematurely sits into recliner requiring A x 2 for guidance - safety)   Toilet transfer 3  Moderate assistance   Additional items Assist x 2;Impulsive;Verbal cues;Commode  (Attempts transfer onto toilet however, prematurely sits + requires A x 2 c pull behind commode + tactile A to sit safely)   Additional Comments poor safety awareness noted   Ambulation/Elevation   Gait pattern Improper Weight shift;Decreased foot clearance;Forward Flexion   Gait Assistance 3  Moderate assist   Additional items Assist x 2;Verbal cues   Assistive Device Rolling walker   Distance 25 and 10 ft   Balance   Static Sitting Fair +   Dynamic Sitting Fair   Static Standing Poor +   Dynamic Standing Poor   Ambulatory Poor   Activity Tolerance   Activity Tolerance   (Cognitive deficits)   Assessment   Prognosis Fair   Assessment Pt is 83 y.o. male seen for PT evaluation s/p admit to [facility] on 7/13/2024 w/ Aspiration pneumonia (HCC). PT consulted to assess pt's functional mobility and d/c needs. Order placed for PT eval and tx, w/ up and OOB as tolerated order. Pt agreeable to PT  session upon arrival, pt found seated OOB in recliner.  PTA, pt was requiring A for mobility.  Pt to benefit from continued PT tx to address deficits and maximize level of functional  independent mobility and consistency. Upon conclusion pt  seated in recliner. Complexity: Comorbidities affecting pt's physical performance at time of assessment include: dementia and hard of hearing. Personal factors affecting pt at time of IE include: advanced age, limited mobility, history of falls, limited insight into impairments, and decreased cognition. Please find objective findings from PT assessment regarding body systems outlined above with impairments and limitations including impaired balance, gait deviations, decreased safety awareness, impaired judgement, fall risk, and decreased cognition.  Pt's clinical presentation is currently unstable/unpredictable seen in pt's presentation of confusion and impulsivity during admission. The patient's AM-PAC Basic Mobility Inpatient Short Form Raw Score is  10.  Based on patient presentations and impairments, pt would most appropriately benefit from Level 2 resource intensity upon discharge. Please also refer to the recommendation of the Physical Therapist for safe discharge planning. RN verbalized pt appropriate for PT session. Pt seen as a co-eval with OT due to the patient's co-morbidities, clinically unstable presentation, and present impairments which are a regression from the patient's baseline.   Goals   Patient Goals to go to the bathroom   LTG Expiration Date 07/25/24   Long Term Goal #1 Pt will: Perform bed mobility tasks to Supervision to decrease risk for falls. Perform transfers to Supervision to decrease risk for falls. Perform ambulation with Supervision and RW for 200 ft  to  decrease risk for falls. Increase dynamic standing balance to F+ to decrease fall risk.  Increase BLE strength to 4+/5 to improve functional mobility.  Increase OOB activity tolerance to 10 minutes without s/s of exertion to decrease fall risk.   Plan   Treatment/Interventions Functional transfer training;Therapeutic exercise;Endurance training;Gait training;Bed  mobility;Equipment eval/education   PT Frequency 3-5x/wk   Discharge Recommendation   Rehab Resource Intensity Level, PT II (Moderate Resource Intensity)   Equipment Recommended Walker   Walker Package Recommended Wheeled walker   AM-PAC Basic Mobility Inpatient   Turning in Flat Bed Without Bedrails 3   Lying on Back to Sitting on Edge of Flat Bed Without Bedrails 3   Moving Bed to Chair 1   Standing Up From Chair Using Arms 1   Walk in Room 1   Climb 3-5 Stairs With Railing 1   Basic Mobility Inpatient Raw Score 10   Turning Head Towards Sound 4   Follow Simple Instructions 2   Low Function Basic Mobility Raw Score  16   Low Function Basic Mobility Standardized Score  25.72   [facility] Highest Level Of Mobility   -HLM Goal 4: Move to chair/commode   -HLM Achieved 7: Walk 25 feet or more       Time In: 1100  Time Out: 1111  Total Evaluation Minutes: 11    Tammi Ramsay, PT

## 2024-07-15 NOTE — OCCUPATIONAL THERAPY NOTE
Occupational Therapy Evaluation     Patient Name: Thomas Chase  Today's Date: 7/15/2024  Problem List  Principal Problem:    Aspiration pneumonia (HCC)  Active Problems:    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HCC)    Diastolic dysfunction    Sensorineural hearing loss (SNHL), bilateral    H/O traumatic subdural hematoma    Dysphagia    Bilateral lower extremity weakness    Benign prostatic hyperplasia without lower urinary tract symptoms    Acute metabolic encephalopathy    UTI (urinary tract infection)    H/O orthostatic hypotension    Past Medical History  Past Medical History:   Diagnosis Date    Abnormal echocardiogram 06/27/2022    Arthritis     EKG abnormalities 06/19/2022    Encounter for general adult medical examination without abnormal findings 03/20/2019    Fall 11/03/2022    Hearing loss 04/08/2009    Hyperlipidemia     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    Macular degeneration, wet (HCC)     Urinary retention 06/02/2022     Past Surgical History  Past Surgical History:   Procedure Laterality Date    BRAIN HEMATOMA EVACUATION Left 05/28/2022    Procedure: left CRANIOTOMY FOR SUBDURAL HEMATOMA;  Surgeon: Chris Watts MD;  Location: [facility];  Service: Neurosurgery    CARPAL TUNNEL RELEASE      HERNIA REPAIR Right     inguinal    IR CEREBRAL ANGIOGRAPHY / INTERVENTION  06/02/2022    VT COCHLEAR DEVICE IMPLANTATION W/WO MASTOIDECTOMY Left 1/17/2023    Procedure: LEFT COCHLEAR IMPLANTATION WITH MASTOIDECTOMY AND FACIAL NERVE MONITORING WITH AUDIOMETRIC TESTING OF THE IMPLANT;  Surgeon: Susie Tuttle MD;  Location:  MAIN OR;  Service: ENT    VT NEUROPLASTY &/TRANSPOS MEDIAN NRV CARPAL TUNNE Right 09/20/2021    Procedure: RELEASE CARPAL TUNNEL;  Surgeon: Bruno Segovia MD;  Location: [facility];  Service: Orthopedics         07/15/24 0921   OT Last Visit   OT Visit Date 07/15/24   Note Type   Note type Evaluation   Additional Comments Nsg staff verbally cleared pt for OT evaluation.   "Pt received  seated in bedside recliner on this date reporting no pain + is agreeable to OT session @ this time. @ exit, pt remains in  seated in bedside recliner c all lines intact, all needs met, call bell in hand + nsg aware of location/disposition.  (CG present in room.)   Pain Assessment   Pain Assessment Tool 0-10   Pain Score No Pain   Restrictions/Precautions   Weight Bearing Precautions Per Order No   Other Precautions Fall Risk;Hard of hearing;Cognitive;Chair Alarm;Bed Alarm;Telemetry;Impulsive   Home Living   Type of Home House   Home Layout Multi-level;Performs ADLs on one level;Able to live on main level with bedroom/bathroom;Stairs to enter with rails  ((1 HATTIE))   Bathroom Shower/Tub Walk-in shower   Bathroom Toilet Standard   Bathroom Equipment Grab bars in shower;Grab bars around toilet;Shower chair   Bathroom Accessibility Accessible   Home Equipment Walker;Wheelchair-manual  (RW for mobility)   Prior Function   Level of [place] Needs assistance with ADLs;Needs assistance with functional mobility;Needs assistance with IADLS   Lives With Spouse   Receives Help From Family;Home health  (24 hour HHA)   IADLs Family/Friend/Other provides transportation;Family/Friend/Other provides meals;Family/Friend/Other provides medication management   Lifestyle   Autonomy Pt lives in  2 story home c wife + @ baseline, performs ADLs  with A, IADLs with A + fxl mobility with A with RW. (-) . Pt is retired.   Reciprocal Relationships Family   Service to Others Retired   Subjective   Subjective \"I have to go to the bathroom\"   ADL   Eating Assistance 4  Minimal Assistance   Grooming Assistance 4  Minimal Assistance   UB Bathing Assistance 4  Minimal Assistance   LB Bathing Assistance 2  Maximal Assistance   UB Dressing Assistance 4  Minimal Assistance   LB Dressing Assistance 2  Maximal Assistance   Toileting Assistance  1  Total Assistance   Bed Mobility   Additional Comments DNT bed mobility; pt " received/remained seated in bedside recliner.   Transfers   Sit to Stand   (CGA)   Additional items Impulsive   Stand to Sit 3  Moderate assistance   Additional items Assist x 2;Impulsive  (Prematurely sits into recliner requiring A x 2 for guidance - safety)   Toilet transfer 3  Moderate assistance   Additional items Assist x 2;Impulsive;Verbal cues;Commode  (Attempts transfer onto toilet however, prematurely sits + requires A x 2 c pull behind commode + tactile A to sit safely)   Functional Mobility   Additional Comments Pt performed short distance ADL related fxl mobility in room and to/from bathroom c CGA, x 2, with RW simulating toileting distances.   No overt LOB demonstrated + pt denies pain /  denies SOB/ denies dizziness during transitional movements. P safety awareness noted while navigating t/o room. Transitions to bedside recliner for remainder of session.   Balance   Static Sitting Fair +   Dynamic Sitting Fair   Static Standing Poor +   Dynamic Standing Poor   Ambulatory Poor   Activity Tolerance   Activity Tolerance   (Cognitive deficits)   Medical Staff Made Aware PT/OT co-eval on this date d/t medical complexity, ambulatory dysfunction c high fall risk, various impeding lines + requirement for skilled interdisciplinary analysis of appropriate d/c recommendations. PT/OT POC/goals I'ly determined per respective discipline. (Tammi)   Nurse Made Aware Medical staff made aware of current fxn, recommendations for d/c planning, fall risk + pt's location upon exit. (Prachi)   Cognition   Overall Cognitive Status Impaired   Attention Difficulty attending to directions   Orientation Level Unable to assess   Memory Decreased recall of biographical information;Decreased long term memory;Decreased short term memory;Decreased recall of recent events;Decreased recall of precautions   Following Commands Unable to follow one step commands   Comments Requires 24/7   Assessment   Limitation Decreased UE  strength;Decreased cognition;Decreased endurance;Decreased self-care trans;Decreased high-level ADLs   Prognosis Fair   Assessment Patient is a 83 y.o. male seen for OT evaluation s/p admit to  [facility] on 7/13/2024 w/Aspiration pneumonia (HCC) + commorbidities/PMHx (as listed in medical record) affecting patient's functional performance c ADL tasks at time of assessment. OT orders placed for evaluation and treatment to assess pt's ADLs, cognitive status + performance during functional tasks in order to formulate appropriate d/c recommendations.     Therapist performed at least two patient identifiers during session including name and wristband. Personal factors affecting patient at time of initial evaluation include: step(s) to enter environment, multi-level environment, advanced age, inability to ambulate household distances, decreased initiation and engagement, and difficulty communicating.   Pt's clinical presentation is currently unstable/unpredictable given new onset deficits that effect pt's occupational performance and ability to safely return to OF including decrease activity tolerance, decrease standing balance, decrease sitting balance, decrease performance during ADL tasks, decrease cognition, decrease safety awareness , increase impulsiveness, decrease generalized strength, decrease activity engagement, and decrease performance during functional transfers combined with medical complications of change in mental status, abnormal CBC, impulsivity during admission, and need for input for mobility technique/safety. This evaluation required an extensive review of medical and/or therapy records and additional review of physical, cognitive and psychosocial history related to functional performance. Based upon functional performance deficits and assessments, this evaluation has been identified as a  high complexity evaluation.      Patient to benefit from continued Occupational Therapy treatment while  in the hospital to address aforementioned deficits and maximize level of functional independence with ADLs and functional mobility. Pt currently requires A to facilitate appropriate d/c plan.   Plan   Treatment Interventions ADL retraining;Functional transfer training;Endurance training;Cognitive reorientation;Patient/family training;Equipment evaluation/education;Compensatory technique education;Energy conservation;Activityengagement   Goal Expiration Date 07/25/24   OT Treatment Day 0   OT Frequency 3-5x/wk   Discharge Recommendation   Rehab Resource Intensity Level, OT II (Moderate Resource Intensity)   AM-PAC Daily Activity Inpatient   Lower Body Dressing 2   Bathing 2   Toileting 2   Upper Body Dressing 3   Grooming 3   Eating 3   Daily Activity Raw Score 15   Daily Activity Standardized Score (Calc for Raw Score >=11) 34.69   AM-PAC Applied Cognition Inpatient   Following a Speech/Presentation 1   Understanding Ordinary Conversation 1   Taking Medications 1   Remembering Where Things Are Placed or Put Away 1   Remembering List of 4-5 Errands 1   Taking Care of Complicated Tasks 1   Applied Cognition Raw Score 6   Applied Cognition Standardized Score 7.69   The patient's raw score on the AM-PAC Daily Activity inpatient short form is 15, standardized score is 34.69, less than 39.4. Please refer to the recommendation of the Occupational Therapist for safe DC planning.    Resource Intensity Recommendation: Level II (Moderate Resource Intensity)        GOALS:    *ADL transfers with CGA for inc'd independence with ADLs/purposeful tasks    *Increase stand tolerance x 5  m for inc'd tolerance with standing purposeful tasks    *Participate in 10m UE therex to increase overall stamina/activity tolerance for purposeful tasks    *Bed mobility- (S) for inc'd independence to manage own comfort and initiate EOB & OOB purposeful tasks      *Patient will increase OOB/sitting tolerance to 2-4 hours per day to increase  participation in self-care and leisure tasks with no s/s of exertion.       Yaima Woods, OTR/L

## 2024-07-16 ENCOUNTER — APPOINTMENT (INPATIENT)
Dept: RADIOLOGY | Facility: HOSPITAL | Age: 84
DRG: 177 | End: 2024-07-16
Payer: MEDICARE

## 2024-07-16 LAB
ANION GAP SERPL CALCULATED.3IONS-SCNC: 6 MMOL/L (ref 4–13)
BASOPHILS # BLD AUTO: 0.02 THOUSANDS/ÂΜL (ref 0–0.1)
BASOPHILS NFR BLD AUTO: 0 % (ref 0–1)
BUN SERPL-MCNC: 17 MG/DL (ref 5–25)
CALCIUM SERPL-MCNC: 8.9 MG/DL (ref 8.4–10.2)
CHLORIDE SERPL-SCNC: 104 MMOL/L (ref 96–108)
CO2 SERPL-SCNC: 26 MMOL/L (ref 21–32)
CREAT SERPL-MCNC: 0.67 MG/DL (ref 0.6–1.3)
EOSINOPHIL # BLD AUTO: 0.08 THOUSAND/ÂΜL (ref 0–0.61)
EOSINOPHIL NFR BLD AUTO: 1 % (ref 0–6)
ERYTHROCYTE [DISTWIDTH] IN BLOOD BY AUTOMATED COUNT: 13.6 % (ref 11.6–15.1)
GFR SERPL CREATININE-BSD FRML MDRD: 88 ML/MIN/1.73SQ M
GLUCOSE SERPL-MCNC: 97 MG/DL (ref 65–140)
HCT VFR BLD AUTO: 37.2 % (ref 36.5–49.3)
HGB BLD-MCNC: 12 G/DL (ref 12–17)
IMM GRANULOCYTES # BLD AUTO: 0.03 THOUSAND/UL (ref 0–0.2)
IMM GRANULOCYTES NFR BLD AUTO: 1 % (ref 0–2)
L PNEUMO1 AG UR QL IA.RAPID: NEGATIVE
LYMPHOCYTES # BLD AUTO: 1.05 THOUSANDS/ÂΜL (ref 0.6–4.47)
LYMPHOCYTES NFR BLD AUTO: 16 % (ref 14–44)
MCH RBC QN AUTO: 30.4 PG (ref 26.8–34.3)
MCHC RBC AUTO-ENTMCNC: 32.3 G/DL (ref 31.4–37.4)
MCV RBC AUTO: 94 FL (ref 82–98)
MONOCYTES # BLD AUTO: 0.49 THOUSAND/ÂΜL (ref 0.17–1.22)
MONOCYTES NFR BLD AUTO: 8 % (ref 4–12)
NEUTROPHILS # BLD AUTO: 4.77 THOUSANDS/ÂΜL (ref 1.85–7.62)
NEUTS SEG NFR BLD AUTO: 74 % (ref 43–75)
NRBC BLD AUTO-RTO: 0 /100 WBCS
PLATELET # BLD AUTO: 424 THOUSANDS/UL (ref 149–390)
PMV BLD AUTO: 9.3 FL (ref 8.9–12.7)
POTASSIUM SERPL-SCNC: 4.1 MMOL/L (ref 3.5–5.3)
RBC # BLD AUTO: 3.95 MILLION/UL (ref 3.88–5.62)
SODIUM SERPL-SCNC: 136 MMOL/L (ref 135–147)
WBC # BLD AUTO: 6.44 THOUSAND/UL (ref 4.31–10.16)

## 2024-07-16 PROCEDURE — 99232 SBSQ HOSP IP/OBS MODERATE 35: CPT

## 2024-07-16 PROCEDURE — 85025 COMPLETE CBC W/AUTO DIFF WBC: CPT | Performed by: HOSPITALIST

## 2024-07-16 PROCEDURE — 80048 BASIC METABOLIC PNL TOTAL CA: CPT | Performed by: HOSPITALIST

## 2024-07-16 PROCEDURE — 87449 NOS EACH ORGANISM AG IA: CPT | Performed by: INTERNAL MEDICINE

## 2024-07-16 PROCEDURE — 74230 X-RAY XM SWLNG FUNCJ C+: CPT

## 2024-07-16 PROCEDURE — 92611 MOTION FLUOROSCOPY/SWALLOW: CPT

## 2024-07-16 RX ORDER — LORAZEPAM 2 MG/ML
0.5 INJECTION INTRAMUSCULAR EVERY 6 HOURS PRN
Status: DISCONTINUED | OUTPATIENT
Start: 2024-07-16 | End: 2024-07-18

## 2024-07-16 RX ORDER — BISACODYL 10 MG
10 SUPPOSITORY, RECTAL RECTAL DAILY PRN
Status: DISCONTINUED | OUTPATIENT
Start: 2024-07-16 | End: 2024-07-25 | Stop reason: HOSPADM

## 2024-07-16 RX ORDER — CEFTRIAXONE 1 G/50ML
1000 INJECTION, SOLUTION INTRAVENOUS EVERY 24 HOURS
Status: DISCONTINUED | OUTPATIENT
Start: 2024-07-16 | End: 2024-07-18

## 2024-07-16 RX ORDER — ACETAMINOPHEN 650 MG/1
325 SUPPOSITORY RECTAL EVERY 4 HOURS PRN
Status: DISCONTINUED | OUTPATIENT
Start: 2024-07-16 | End: 2024-07-25 | Stop reason: HOSPADM

## 2024-07-16 RX ORDER — POLYETHYLENE GLYCOL 3350 17 G/17G
17 POWDER, FOR SOLUTION ORAL DAILY
Status: DISCONTINUED | OUTPATIENT
Start: 2024-07-16 | End: 2024-07-16

## 2024-07-16 RX ADMIN — MIDODRINE HYDROCHLORIDE 5 MG: 5 TABLET ORAL at 11:28

## 2024-07-16 RX ADMIN — SERTRALINE HYDROCHLORIDE 37.5 MG: 25 TABLET ORAL at 08:02

## 2024-07-16 RX ADMIN — CEFTRIAXONE 1000 MG: 1 INJECTION, SOLUTION INTRAVENOUS at 16:48

## 2024-07-16 RX ADMIN — ENOXAPARIN SODIUM 40 MG: 40 INJECTION SUBCUTANEOUS at 08:03

## 2024-07-16 RX ADMIN — AMOXICILLIN AND CLAVULANATE POTASSIUM 1 TABLET: 875; 125 TABLET, FILM COATED ORAL at 08:02

## 2024-07-16 RX ADMIN — BISACODYL 10 MG: 10 SUPPOSITORY RECTAL at 16:45

## 2024-07-16 RX ADMIN — MIDODRINE HYDROCHLORIDE 5 MG: 5 TABLET ORAL at 08:02

## 2024-07-16 NOTE — ASSESSMENT & PLAN NOTE
Patient was recently diagnosed with a Proteus mirabilis UTI on 7/5/2024  Patient was taking Bactrim at home  DC ceftriaxone, and Flagyl as outlined above, will switch to Augmentin which will cover both the possibility of an aspiration event, and his Proteus mirabilis UTI based on prior sensitivities

## 2024-07-16 NOTE — ASSESSMENT & PLAN NOTE
Likely secondary to infectious etiology and PMH dementia   Initial CT head-no acute pathology  Continue close monitoring  Patient is otherwise calm and cooperative at this time

## 2024-07-16 NOTE — PROGRESS NOTES
[facility]  Progress Note  Name: Thomas Chase I  MRN: [id number]  Unit/Bed#: -01 I Date of Admission: 7/13/2024   Date of Service: 7/16/2024 I Hospital Day: 2    Assessment & Plan   * Aspiration pneumonia (HCC)  Assessment & Plan  Presenting with increased confusion, anxiety and balance issues  Initial chest x-ray was concerning for right lower lobe pneumonia   Concern is for the possibility of an aspiration event, or ongoing chronic microaspiration  Last fever documented 7/14 of 101.3 °F  Testing for COVID-19, influenza, and respiratory syncytial virus is negative  Urinary antigens negative   Blood cultures no growth at 24 hours  Patient was initially put on ceftriaxone, and Flagyl - switched to Augmentin 7/15 D2, with plan to continue 7 day course  Speech recommends pureed diet with honey thick liquids   VBS ordered   PT, OT recommending level II on discharge     H/O orthostatic hypotension  Assessment & Plan  Continue midodrine    UTI (urinary tract infection)  Assessment & Plan  Patient was recently diagnosed with a Proteus mirabilis UTI on 7/5/2024  Patient was taking Bactrim at home  DC ceftriaxone, and Flagyl as outlined above, will switch to Augmentin which will cover both the possibility of an aspiration event, and his Proteus mirabilis UTI based on prior sensitivities    Acute metabolic encephalopathy  Assessment & Plan  Likely secondary to infectious etiology and PMH dementia   Initial CT head-no acute pathology  Continue close monitoring  Patient is otherwise calm and cooperative at this time    Benign prostatic hyperplasia without lower urinary tract symptoms  Assessment & Plan  Continue tamsulosin    Bilateral lower extremity weakness  Assessment & Plan  PT/OT recommending level II  Clinically suspect that the patient might benefit from rehab  Of note, the patient's family are interested in the same    Dysphagia  Assessment & Plan  Formal speech evaluation recommending  puréed diet and honey thick liquid  F/u VBS    H/O traumatic subdural hematoma  Assessment & Plan  No acute changes on imaging  Continue monitoring    Sensorineural hearing loss (SNHL), bilateral  Assessment & Plan  Patient is very hard of hearing      Diastolic dysfunction  Assessment & Plan  Without exacerbation  Patient examines euvolemic, continue close monitoring  Continue statin    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HCC)  Assessment & Plan  CT head with no acute changes consistent with this, chronic vascular changes  Continue sertraline otherwise         VTE Pharmacologic Prophylaxis: VTE Score: 5 High Risk (Score >/= 5) - Pharmacological DVT Prophylaxis Ordered: enoxaparin (Lovenox). Sequential Compression Devices Ordered.    Mobility:   Basic Mobility Inpatient Raw Score: 12  JH-HLM Goal: 4: Move to chair/commode  JH-HLM Achieved: 4: Move to chair/commode  JH-HLM Goal achieved. Continue to encourage appropriate mobility.    Patient Centered Rounds: I performed bedside rounds with nursing staff today.   Discussions with Specialists or Other Care Team Provider: None    Education and Discussions with Family / Patient:  Will update daughter.     Total Time Spent on Date of Encounter in care of patient: This time was spent on one or more of the following: performing physical exam; counseling and coordination of care; obtaining or reviewing history; documenting in the medical record; reviewing/ordering tests, medications or procedures; communicating with other healthcare professionals and discussing with patient's family/caregivers.    Current Length of Stay: 2 day(s)  Current Patient Status: Inpatient   Certification Statement: The patient will continue to require additional inpatient hospital stay due to VBS ordered for today  Discharge Plan: Anticipate discharge in 24-48 hrs to rehab facility.    Code Status: Level 1 - Full Code    Subjective:   Seen and examined eating breakfast at bedside. Denies any pain or acute  complaints. Communication had through white board in room as patient is very hard of hearing.     Objective:     Vitals:   Temp (24hrs), Av.4 °F (36.9 °C), Min:98.1 °F (36.7 °C), Max:98.9 °F (37.2 °C)    Temp:  [98.1 °F (36.7 °C)-98.9 °F (37.2 °C)] 98.1 °F (36.7 °C)  HR:  [68-84] 84  Resp:  [16-18] 16  BP: ()/(56-61) 92/58  SpO2:  [92 %-95 %] 92 %  Body mass index is 19.49 kg/m².     Input and Output Summary (last 24 hours):     Intake/Output Summary (Last 24 hours) at 2024 0984  Last data filed at 2024 0415  Gross per 24 hour   Intake 600 ml   Output 600 ml   Net 0 ml       Physical Exam:   Physical Exam  Constitutional:       General: He is not in acute distress.  HENT:      Mouth/Throat:      Mouth: Mucous membranes are moist.   Eyes:      Conjunctiva/sclera: Conjunctivae normal.   Cardiovascular:      Heart sounds: Normal heart sounds.   Pulmonary:      Breath sounds: Normal breath sounds.   Abdominal:      Palpations: Abdomen is soft.   Musculoskeletal:      Right lower leg: No edema.      Left lower leg: No edema.   Skin:     General: Skin is warm and dry.   Neurological:      Mental Status: Mental status is at baseline.        Additional Data:     Labs:  Results from last 7 days   Lab Units 24  0524   WBC Thousand/uL 6.44   HEMOGLOBIN g/dL 12.0   HEMATOCRIT % 37.2   PLATELETS Thousands/uL 424*   SEGS PCT % 74   LYMPHO PCT % 16   MONO PCT % 8   EOS PCT % 1     Results from last 7 days   Lab Units 24  0524 24  0455 24  2120   SODIUM mmol/L 136   < > 137   POTASSIUM mmol/L 4.1   < > 4.7   CHLORIDE mmol/L 104   < > 104   CO2 mmol/L 26   < > 27   BUN mg/dL 17   < > 21   CREATININE mg/dL 0.67   < > 0.82   ANION GAP mmol/L 6   < > 6   CALCIUM mg/dL 8.9   < > 9.0   ALBUMIN g/dL  --   --  3.6   TOTAL BILIRUBIN mg/dL  --   --  0.29   ALK PHOS U/L  --   --  93   ALT U/L  --   --  20   AST U/L  --   --  19   GLUCOSE RANDOM mg/dL 97   < > 93    < > = values in this interval not  displayed.     Results from last 7 days   Lab Units 07/13/24  2120   INR  1.04     Results from last 7 days   Lab Units 07/15/24  0737   POC GLUCOSE mg/dl 111         Results from last 7 days   Lab Units 07/13/24  2244 07/13/24  2120   LACTIC ACID mmol/L 0.7  --    PROCALCITONIN ng/ml  --  <0.05       Lines/Drains:  Invasive Devices       Peripheral Intravenous Line  Duration             Peripheral IV 07/13/24 Dorsal (posterior);Left Forearm 2 days                          Imaging: Reviewed radiology reports from this admission including: chest xray    Recent Cultures (last 7 days):   Results from last 7 days   Lab Units 07/16/24  0413 07/13/24 2244   BLOOD CULTURE   --  No Growth at 24 hrs.  No Growth at 24 hrs.   LEGIONELLA URINARY ANTIGEN  Negative  --        Last 24 Hours Medication List:   Current Facility-Administered Medications   Medication Dose Route Frequency Provider Last Rate    acetaminophen  650 mg Oral Q6H PRN Mary Lou Mobley MD      amoxicillin-clavulanate  1 tablet Oral Q12H [facility] Tameka Zamora MD      enoxaparin  40 mg Subcutaneous Daily Mary Lou Mobley MD      LORazepam  0.25 mg Oral Q8H PRN Mary Lou Mobley MD      melatonin  9 mg Oral HS Mary Lou Mobley MD      midodrine  5 mg Oral TID AC Mary Lou Mobley MD      polyethylene glycol  17 g Oral Daily PRN Trista Mims DO      pravastatin  40 mg Oral Daily With Dinner aMry Lou Mobley MD      senna  8.6 mg Oral HS Mary Lou Mobley MD      sertraline  37.5 mg Oral Daily Mary Lou Mobley MD      tamsulosin  0.8 mg Oral Daily With Dinner Mary Lou Mobley MD          Today, Patient Was Seen By: Danitza Duarte PA-C    **Please Note: This note may have been constructed using a voice recognition system.**

## 2024-07-16 NOTE — PROCEDURES
Video Swallow Study      Patient Name: Thomas Chase  Today's Date: 7/16/2024        Past Medical History  Past Medical History:   Diagnosis Date    Abnormal echocardiogram 06/27/2022    Arthritis     EKG abnormalities 06/19/2022    Encounter for general adult medical examination without abnormal findings 03/20/2019    Fall 11/03/2022    Hearing loss 04/08/2009    Hyperlipidemia     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    Macular degeneration, wet (HCC)     Urinary retention 06/02/2022        Past Surgical History  Past Surgical History:   Procedure Laterality Date    BRAIN HEMATOMA EVACUATION Left 05/28/2022    Procedure: left CRANIOTOMY FOR SUBDURAL HEMATOMA;  Surgeon: Chris Watts MD;  Location: [facility];  Service: Neurosurgery    CARPAL TUNNEL RELEASE      HERNIA REPAIR Right     inguinal    IR CEREBRAL ANGIOGRAPHY / INTERVENTION  06/02/2022    DE COCHLEAR DEVICE IMPLANTATION W/WO MASTOIDECTOMY Left 1/17/2023    Procedure: LEFT COCHLEAR IMPLANTATION WITH MASTOIDECTOMY AND FACIAL NERVE MONITORING WITH AUDIOMETRIC TESTING OF THE IMPLANT;  Surgeon: Susie Tuttle MD;  Location:  MAIN OR;  Service: ENT    DE NEUROPLASTY &/TRANSPOS MEDIAN NRV CARPAL TUNNE Right 09/20/2021    Procedure: RELEASE CARPAL TUNNEL;  Surgeon: Bruno Segovia MD;  Location: [facility];  Service: Orthopedics       Summary:  Images are on PACS for review.     Oral Phase :Pt presented with mild oral dysphagia. Bolus control, formation, and transfer were min reduced. Reduced bolus control resulted in spill to pharynx.     Pharyngeal Phase : Pt presented with severe pharyngeal dysphagia. Swallow initiation is delayed resulting in premature spill to the valleculae. Hyoid elevation, laryngeal excursion, and pharyngeal constriction is reduced.  Epiglottic inversion is incomplete. Airway closure was reduced. Pharyngeal retention in valleculae, along AE folds, in pyriforms, and in UES. Phillip  aspiration observed with thin liquids before during and after swallow. Mod aspiration with nectar and honey thick liquids. Secondary swallow to clear retention was mostly absent, if pt was able to initiate swallow was severely delayed. Retropulsion resulted in aspiration of pharyngeal residue. Pt inconsistent cough response to aspiration. When pt did cough it was harsh and extended however unsuccessful in clearing residue.     Per Esophageal screen   View is limited as primarily focus on pharyngeal stage appeared adequate for intake.    Observations: Pt is [place]. He does have hx of cognitive impairment. Pt does utilize white board for simple direction following however primarily unable to follow commands when provided verbal, visual, and model cue. ST did not administer additional solids or 13mm pill as safety concern     Recommendations:  Diet:NPO ice chips for pleasure 1-2  at a time with nursing supervision. Consider alternate means of nutrition as appropriate. Consider GOC.   Meds: non oral   Upright position  F/u ST tx:yes   Therapy Prognosis:guarded   Prognosis considerations:age, medical status   **Full Supervision w/ ice chips for pleasure. Oral care prior to trials. Discontinue If increased difficulties with oral control, no initiation of swallow , excessive coughing, increased secretions, or excessive gurgly voice with no success cued cough throat clear  Aspiration Precautions  Reflux Precautions  Consider consult with: Nutrition, GI   Results reviewed with: pt, nursing, family, PA-C  Aspiration precautions posted.  If a dedicated assessment of the esophagus is desired, consider esophagram/barium swallow or EGD.    Goals:  Dysphagia LTG  -Patient will demonstrate safe and effective oral intake (without overt s/s significant oral/pharyngeal dysphagia including s/s penetration or aspiration) for the highest appropriate diet level.     -Patient’s caregiver will demonstrate understanding and adherence to  recommended diet and use of (or prompting for use of) compensatory strategies.     Patient's goal:none stated     H&P/Admit info, pertinent provider notes/procedures/studies/PMH:(copied and placed in this report)  Thomas Chase is a 83 y.o. male with a PMH of vascular dementia, severe protein calorie malnutrition, stents, insomnia, peripheral vascular disease, prior history of aspiration of right lower lobe pneumonia, prior subdural hematoma: February 2024 s/p craniotomy 2 years ago, dysphagia, hearing impairment who presents with family, with a chief complaint two days  of generalized increase weakness, increased confusion with anxiety and balance issues.  Unfortunately patient is unable to provide any information.   Patient's daughter and wife at the bedside states that the patient has been noticed more disoriented than at his baseline with unbalanced gait.  He appeared very weak to the point where 2 people could not hold him.  They noticed patient was coughing more than usual.  Family reports history of dysphagia. Patient also taking Bactrim for UTI.   Family reports having increasing challenges taking care of the patient at home who has 24-hour care concerned that they are unable to take care  and ask in  assistance in placement of the patient in a skilled nursing facility no recent falls.    Patient does not normally speak is unable to communicate effectively, communicates by writing on the board, is severely hearing impaired.  Chest x-ray showed right lower lobe pneumonia.  Labs unrevealing.  On my evaluation patient appears not in acute distress, hemodynamically stable.    Family denies any fevers, nausea, vomiting, diarrhea.    Special Studies  CT BRAIN - WITHOUT CONTRAST 07/13/2024  No acute intracranial abnormality. Chronic microangiopathic changes.   XR CHEST PORTABLE 07/13/2024  Mild right base opacity which could be due to pneumonia.     Code Status:  Level 1 Full code     Previous VBS:  Video Swallow  Study  Summary:  Images are on PACS for review.   Oral Phase : Pt presented with mild oral dysphagia. Bolus control, formation, and transfer were mildly reduced. Mild premature spill occurred. Trace posterior base of tongue residue.   Pharyngeal Phase : Pt presented with mod/severe pharyngeal dysphagia. Swallow initiation was delayed. Premature spillage occurred to the valleculae with all trials. With trials of thin liquids premature spill occurred to the pyriforms. Base of tongue retraction was reduced. Hyoid laryngeal elevation was variable WFL vs mild/mod reduced. Pharyngeal constriction and air way closure were reduced. Epiglottic inversion was incomplete. Mod to Max vallecular retention occurred with all consistencies. Pyriform retention occurred with all consistencies.   Phillip aspiration occurred during and after the swallow with thin liquids via cup/straw. Pt had no sensation of aspiration events. Increased difficulty following verbal and written commands due to cognitive status and [facility].  Cued cough appeared weak and ineffective in clearing aspirated residue. Pt will independently initiate multiple swallows (3-4) to clear residue however min successful in clearing residue.  Per Esophageal screen   All material cleared adequately   Observations: Pt [facility] and hx of cognitive deficits. ST provided verbal and written directions however increased difficulty following. ST did administer solids or 13mm pill as safety concern.   Recommendations:  Diet: Dysphagia 1 pureed   Liquids: Nectar   Strategies: slow rate, small sips, alternate liquids with solids, double swallow and effortful swallow.   Upright position  F/u ST tx: yes  Full/Supervision  Aspiration Precautions  Reflux Precautions  Consider consult with: Nutrition   Results reviewed with: pt, OP SLP  Aspiration precautions posted.  If a dedicated assessment of the esophagus is desired, consider esophagram/barium swallow or EGD.    Precautions : Fall     Food  allergies:  No known   Current diet:  Dysphagia 1 pureed and htl   Premorbid diet:  Dysphagia 1 pureed and ntl   Dentition  Adequate    Oral Mech  WNL   O2 requirements:  Room air    Vocal Quality/Speech WNL   Cognitive status:  Alert      Consistencies administered: Barium laden thin liquids, nectar, and honey thick liquids. Liquids were administered by cup and straw. Did not administer additional solids as safety concern.     Pt was seated laterally at 90 degrees.   Pt  unable to be viewed in AP position due to transfer status.

## 2024-07-16 NOTE — QUICK NOTE
Patient switched back to ceftriaxone from Augmentin given strict n.p.o. status.  Will discontinue oral medications at this time.  Appreciate GI input regarding possible PEG tube.

## 2024-07-16 NOTE — ASSESSMENT & PLAN NOTE
PT/OT recommending level II  Clinically suspect that the patient might benefit from rehab  Of note, the patient's family are interested in the same

## 2024-07-16 NOTE — PLAN OF CARE
Problem: MUSCULOSKELETAL - ADULT  Goal: Maintain or return mobility to safest level of function  Description: INTERVENTIONS:  - Assess patient's ability to carry out ADLs; assess patient's baseline for ADL function and identify physical deficits which impact ability to perform ADLs (bathing, care of mouth/teeth, toileting, grooming, dressing, etc.)  - Assess/evaluate cause of self-care deficits   - Assess range of motion  - Assess patient's mobility  - Assess patient's need for assistive devices and provide as appropriate  - Encourage maximum independence but intervene and supervise when necessary  - Involve family in performance of ADLs  - Assess for home care needs following discharge   - Consider OT consult to assist with ADL evaluation and planning for discharge  - Provide patient education as appropriate  Outcome: Progressing   Pt assisted ax2 RW to recliner. Cushion to chair, chair alarm on at this time.

## 2024-07-16 NOTE — ASSESSMENT & PLAN NOTE
Presenting with increased confusion, anxiety and balance issues  Initial chest x-ray was concerning for right lower lobe pneumonia   Concern is for the possibility of an aspiration event, or ongoing chronic microaspiration  Last fever documented 7/14 of 101.3 °F  Testing for COVID-19, influenza, and respiratory syncytial virus is negative  Urinary antigens negative   Blood cultures no growth at 24 hours  Patient was initially put on ceftriaxone, and Flagyl - switched to Augmentin 7/15 D2, with plan to continue 7 day course  Speech recommends pureed diet with honey thick liquids   VBS ordered   PT, OT recommending level II on discharge

## 2024-07-16 NOTE — CASE MANAGEMENT
Case Management Discharge Planning Note    Patient name Thomas Chase  Location /-01 MRN [id number]  : 1940 Date 2024       Current Admission Date: 2024  Current Admission Diagnosis:Aspiration pneumonia ([facility])   Patient Active Problem List    Diagnosis Date Noted Date Diagnosed    H/O orthostatic hypotension 07/15/2024     Aspiration pneumonia (HCC) 2024     Acute metabolic encephalopathy 2024     UTI (urinary tract infection) 2024     Peripheral vascular disease, unspecified ([facility]) 2024     Abnormal urinalysis 2024     Benign prostatic hyperplasia without lower urinary tract symptoms 2024     Insomnia 2024     Abnormal CT of the head 2024     Hematuria 2024     Headache 2024     Behavior safety risk 2024     Bilateral lower extremity weakness 2024     Abnormal CT scan, lumbar spine 02/15/2024     Severe protein-calorie malnutrition (HCC) 2024     Debility 2024     Pharyngeal myoclonus 2023     Dysphagia 2023     Macular degeneration, age related 2023     H/O traumatic subdural hematoma 2023     Sensorineural hearing loss (SNHL), bilateral 2022     Balance disorder 2022     Right bundle-branch block 2022     Low BP 2022     Diastolic dysfunction 2022     Constipation due to neurogenic bowel 2022     Urinary retention 2022     SDH (subdural hematoma) ([facility]) 2022     Primary osteoarthritis of left knee 2022     Hygroma 2022     Right hand pain      Carpal tunnel syndrome on right      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[facility]) 2021     Overweight (BMI 25.0-29.9) 2021     Negative depression screening 2019     Hypercholesterolemia 2018     Borderline hypertension 2018       LOS (days): 2  Geometric Mean LOS (GMLOS) (days): 5  Days to GMLOS:2.3     OBJECTIVE:  Risk of Unplanned Readmission Score: 22.98          Current admission status: Inpatient   Preferred Pharmacy:   [id number]  [place] - [address]  [address] [address]  Phone: [phone] Fax: [phone]    Primary Care Provider: RONY Coe    Primary Insurance: MEDICARE  Secondary Insurance: [facility]    DISCHARGE DETAILS:                                          Other Referral/Resources/Interventions Provided:  Referral Comments: pt not stable for d/c - pt had a video swallow  - pt mcelroy need a peg tube

## 2024-07-17 PROBLEM — Z53.20 REFUSAL OF TREATMENT: Status: ACTIVE | Noted: 2024-07-17

## 2024-07-17 LAB
ANION GAP SERPL CALCULATED.3IONS-SCNC: 6 MMOL/L (ref 4–13)
BUN SERPL-MCNC: 17 MG/DL (ref 5–25)
CALCIUM SERPL-MCNC: 9.1 MG/DL (ref 8.4–10.2)
CHLORIDE SERPL-SCNC: 103 MMOL/L (ref 96–108)
CO2 SERPL-SCNC: 28 MMOL/L (ref 21–32)
CREAT SERPL-MCNC: 0.62 MG/DL (ref 0.6–1.3)
ERYTHROCYTE [DISTWIDTH] IN BLOOD BY AUTOMATED COUNT: 13.5 % (ref 11.6–15.1)
GFR SERPL CREATININE-BSD FRML MDRD: 91 ML/MIN/1.73SQ M
GLUCOSE SERPL-MCNC: 91 MG/DL (ref 65–140)
HCT VFR BLD AUTO: 39.6 % (ref 36.5–49.3)
HGB BLD-MCNC: 12.8 G/DL (ref 12–17)
MCH RBC QN AUTO: 30.6 PG (ref 26.8–34.3)
MCHC RBC AUTO-ENTMCNC: 32.3 G/DL (ref 31.4–37.4)
MCV RBC AUTO: 95 FL (ref 82–98)
PLATELET # BLD AUTO: 489 THOUSANDS/UL (ref 149–390)
PMV BLD AUTO: 9.2 FL (ref 8.9–12.7)
POTASSIUM SERPL-SCNC: 4.2 MMOL/L (ref 3.5–5.3)
RBC # BLD AUTO: 4.18 MILLION/UL (ref 3.88–5.62)
SODIUM SERPL-SCNC: 137 MMOL/L (ref 135–147)
WBC # BLD AUTO: 7.35 THOUSAND/UL (ref 4.31–10.16)

## 2024-07-17 PROCEDURE — 99223 1ST HOSP IP/OBS HIGH 75: CPT | Performed by: INTERNAL MEDICINE

## 2024-07-17 PROCEDURE — 97116 GAIT TRAINING THERAPY: CPT

## 2024-07-17 PROCEDURE — 97530 THERAPEUTIC ACTIVITIES: CPT

## 2024-07-17 PROCEDURE — 85027 COMPLETE CBC AUTOMATED: CPT

## 2024-07-17 PROCEDURE — 80048 BASIC METABOLIC PNL TOTAL CA: CPT

## 2024-07-17 PROCEDURE — 99232 SBSQ HOSP IP/OBS MODERATE 35: CPT

## 2024-07-17 RX ORDER — SODIUM CHLORIDE, SODIUM GLUCONATE, SODIUM ACETATE, POTASSIUM CHLORIDE, MAGNESIUM CHLORIDE, SODIUM PHOSPHATE, DIBASIC, AND POTASSIUM PHOSPHATE .53; .5; .37; .037; .03; .012; .00082 G/100ML; G/100ML; G/100ML; G/100ML; G/100ML; G/100ML; G/100ML
75 INJECTION, SOLUTION INTRAVENOUS CONTINUOUS
Status: DISCONTINUED | OUTPATIENT
Start: 2024-07-17 | End: 2024-07-18

## 2024-07-17 RX ADMIN — CEFTRIAXONE 1000 MG: 1 INJECTION, SOLUTION INTRAVENOUS at 15:55

## 2024-07-17 RX ADMIN — SODIUM CHLORIDE, SODIUM GLUCONATE, SODIUM ACETATE, POTASSIUM CHLORIDE, MAGNESIUM CHLORIDE, SODIUM PHOSPHATE, DIBASIC, AND POTASSIUM PHOSPHATE 75 ML/HR: .53; .5; .37; .037; .03; .012; .00082 INJECTION, SOLUTION INTRAVENOUS at 12:27

## 2024-07-17 RX ADMIN — ENOXAPARIN SODIUM 40 MG: 40 INJECTION SUBCUTANEOUS at 08:24

## 2024-07-17 NOTE — ASSESSMENT & PLAN NOTE
CT head with no acute changes consistent with this, chronic vascular changes  Home sertraline on hold for NPO status

## 2024-07-17 NOTE — ASSESSMENT & PLAN NOTE
Presenting with increased confusion, anxiety and balance issues  Initial chest x-ray was concerning for right lower lobe pneumonia   Concern is for the possibility of an aspiration event, or ongoing chronic microaspiration  Last fever documented 7/14 of 101.3 °F  Testing for COVID-19, influenza, and respiratory syncytial virus is negative  Urinary antigens negative   Blood cultures no growth at 24 hours  Patient was initially put on ceftriaxone, and Flagyl - switched to Augmentin 7/15 D2, with plan to continue 7 day course  Speech recommends pureed diet with honey thick liquids   Video barium swallow 7/16: patient aspirated before, during and after swallow  Recommending strict NPO and possible alternative means of nutrition   Continued goals of care discussion   Full supervision with ice chips for pleasure   GI consulted   Holding Lovenox for tentative plan for PEG   PT, OT recommending level II on discharge     Full length discussion (SLIM, CM, speech, GI) had with wife, daughters, and son regarding options for strict NPO status for ongoing aspiration. Family is still deciding whether they will continue with PEG vs. Hospice

## 2024-07-17 NOTE — PLAN OF CARE
Problem: PHYSICAL THERAPY ADULT  Goal: Performs mobility at highest level of function for planned discharge setting.  See evaluation for individualized goals.  Description: Treatment/Interventions: Functional transfer training, Therapeutic exercise, Endurance training, Gait training, Bed mobility, Equipment eval/education  Equipment Recommended: Walker       See flowsheet documentation for full assessment, interventions and recommendations.  Outcome: Progressing  Note: Prognosis: Fair  Problem List: Decreased strength, Impaired balance, Decreased mobility, Decreased coordination, Decreased cognition, Impaired judgement, Decreased safety awareness, Impaired hearing  Assessment: Pt seen for PT treatment session this date with interventions consisting of transfer training, gait training, and education provided as needed for safety and direction to improve functional mobility, safety awareness, and activity tolerance. Pt agreeable to PT treatment session upon arrival, pt found sitting OOB in recliner. At end of session, pt left sitting OOB in recliner with chair alarm activated, BLE elevated, and with all needs in reach. In comparison to previous session, pt with improvements in ambulation distance . Continue to recommend Level II (Moderate Resource Intensity at time of d/c in order to maximize pt's functional independence and safety w/ mobility. Pt continues to be functioning below baseline level. PT will continue to see pt while here in order to address the deficits listed above and provide interventions consistent w/ POC in effort to achieve STGs.        Rehab Resource Intensity Level, PT: II (Moderate Resource Intensity)    See flowsheet documentation for full assessment.

## 2024-07-17 NOTE — PLAN OF CARE
Problem: Potential for Falls  Goal: Patient will remain free of falls  Description: INTERVENTIONS:  - Educate patient/family on patient safety including physical limitations  - Instruct patient to call for assistance with activity   - Consult OT/PT to assist with strengthening/mobility   - Keep Call bell within reach  - Keep bed low and locked with side rails adjusted as appropriate  - Keep care items and personal belongings within reach  - Initiate and maintain comfort rounds  - Make Fall Risk Sign visible to staff  - Offer Toileting every  Hours, in advance of need  - Initiate/Maintain alarm  - Obtain necessary fall risk management equipment:   - Apply yellow socks and bracelet for high fall risk patients  - Consider moving patient to room near nurses station  Outcome: Progressing     Problem: Prexisting or High Potential for Compromised Skin Integrity  Goal: Skin integrity is maintained or improved  Description: INTERVENTIONS:  - Identify patients at risk for skin breakdown  - Assess and monitor skin integrity  - Assess and monitor nutrition and hydration status  - Monitor labs   - Assess for incontinence   - Turn and reposition patient  - Assist with mobility/ambulation  - Relieve pressure over bony prominences  - Avoid friction and shearing  - Provide appropriate hygiene as needed including keeping skin clean and dry  - Evaluate need for skin moisturizer/barrier cream  - Collaborate with interdisciplinary team   - Patient/family teaching  - Consider wound care consult   Outcome: Progressing     Problem: PAIN - ADULT  Goal: Verbalizes/displays adequate comfort level or baseline comfort level  Description: Interventions:  - Encourage patient to monitor pain and request assistance  - Assess pain using appropriate pain scale  - Administer analgesics based on type and severity of pain and evaluate response  - Implement non-pharmacological measures as appropriate and evaluate response  - Consider cultural and  social influences on pain and pain management  - Notify physician/advanced practitioner if interventions unsuccessful or patient reports new pain  Outcome: Progressing     Problem: INFECTION - ADULT  Goal: Absence or prevention of progression during hospitalization  Description: INTERVENTIONS:  - Assess and monitor for signs and symptoms of infection  - Monitor lab/diagnostic results  - Monitor all insertion sites, i.e. indwelling lines, tubes, and drains  - Monitor endotracheal if appropriate and nasal secretions for changes in amount and color  - [place] appropriate cooling/warming therapies per order  - Administer medications as ordered  - Instruct and encourage patient and family to use good hand hygiene technique  - Identify and instruct in appropriate isolation precautions for identified infection/condition  Outcome: Progressing  Goal: Absence of fever/infection during neutropenic period  Description: INTERVENTIONS:  - Monitor WBC    Outcome: Progressing     Problem: SAFETY ADULT  Goal: Patient will remain free of falls  Description: INTERVENTIONS:  - Educate patient/family on patient safety including physical limitations  - Instruct patient to call for assistance with activity   - Consult OT/PT to assist with strengthening/mobility   - Keep Call bell within reach  - Keep bed low and locked with side rails adjusted as appropriate  - Keep care items and personal belongings within reach  - Initiate and maintain comfort rounds  - Make Fall Risk Sign visible to staff  - Offer Toileting every  Hours, in advance of need  - Initiate/Maintain alarm  - Obtain necessary fall risk management equipment:   - Apply yellow socks and bracelet for high fall risk patients  - Consider moving patient to room near nurses station  Outcome: Progressing  Goal: Maintain or return to baseline ADL function  Description: INTERVENTIONS:  -  Assess patient's ability to carry out ADLs; assess patient's baseline for ADL function and  identify physical deficits which impact ability to perform ADLs (bathing, care of mouth/teeth, toileting, grooming, dressing, etc.)  - Assess/evaluate cause of self-care deficits   - Assess range of motion  - Assess patient's mobility; develop plan if impaired  - Assess patient's need for assistive devices and provide as appropriate  - Encourage maximum independence but intervene and supervise when necessary  - Involve family in performance of ADLs  - Assess for home care needs following discharge   - Consider OT consult to assist with ADL evaluation and planning for discharge  - Provide patient education as appropriate  Outcome: Progressing  Goal: Maintains/Returns to pre admission functional level  Description: INTERVENTIONS:  - Perform AM-PAC 6 Click Basic Mobility/ Daily Activity assessment daily.  - Set and communicate daily mobility goal to care team and patient/family/caregiver.   - Collaborate with rehabilitation services on mobility goals if consulted  - Perform Range of Motion  times a day.  - Reposition patient every  hours.  - Dangle patient  times a day  - Stand patient  times a day  - Ambulate patient  times a day  - Out of bed to chair  times a day   - Out of bed for meals  times a day  - Out of bed for toileting  - Record patient progress and toleration of activity level   Outcome: Progressing     Problem: DISCHARGE PLANNING  Goal: Discharge to home or other facility with appropriate resources  Description: INTERVENTIONS:  - Identify barriers to discharge w/patient and caregiver  - Arrange for needed discharge resources and transportation as appropriate  - Identify discharge learning needs (meds, wound care, etc.)  - Arrange for interpretive services to assist at discharge as needed  - Refer to Case Management Department for coordinating discharge planning if the patient needs post-hospital services based on physician/advanced practitioner order or complex needs related to functional status,  cognitive ability, or social support system  Outcome: Progressing     Problem: Knowledge Deficit  Goal: Patient/family/caregiver demonstrates understanding of disease process, treatment plan, medications, and discharge instructions  Description: Complete learning assessment and assess knowledge base.  Interventions:  - Provide teaching at level of understanding  - Provide teaching via preferred learning methods  Outcome: Progressing     Problem: GENITOURINARY - ADULT  Goal: Maintains or returns to baseline urinary function  Description: INTERVENTIONS:  - Assess urinary function  - Encourage oral fluids to ensure adequate hydration if ordered  - Administer IV fluids as ordered to ensure adequate hydration  - Administer ordered medications as needed  - Offer frequent toileting  - Follow urinary retention protocol if ordered  Outcome: Progressing  Goal: Absence of urinary retention  Description: INTERVENTIONS:  - Assess patient’s ability to void and empty bladder  - Monitor I/O  - Bladder scan as needed  - Discuss with physician/AP medications to alleviate retention as needed  - Discuss catheterization for long term situations as appropriate  Outcome: Progressing     Problem: METABOLIC, FLUID AND ELECTROLYTES - ADULT  Goal: Electrolytes maintained within normal limits  Description: INTERVENTIONS:  - Monitor labs and assess patient for signs and symptoms of electrolyte imbalances  - Administer electrolyte replacement as ordered  - Monitor response to electrolyte replacements, including repeat lab results as appropriate  - Instruct patient on fluid and nutrition as appropriate  Outcome: Progressing  Goal: Fluid balance maintained  Description: INTERVENTIONS:  - Monitor labs   - Monitor I/O and WT  - Instruct patient on fluid and nutrition as appropriate  - Assess for signs & symptoms of volume excess or deficit  Outcome: Progressing  Goal: Glucose maintained within target range  Description: INTERVENTIONS:  - Monitor  Blood Glucose as ordered  - Assess for signs and symptoms of hyperglycemia and hypoglycemia  - Administer ordered medications to maintain glucose within target range  - Assess nutritional intake and initiate nutrition service referral as needed  Outcome: Progressing     Problem: MUSCULOSKELETAL - ADULT  Goal: Maintain or return mobility to safest level of function  Description: INTERVENTIONS:  - Assess patient's ability to carry out ADLs; assess patient's baseline for ADL function and identify physical deficits which impact ability to perform ADLs (bathing, care of mouth/teeth, toileting, grooming, dressing, etc.)  - Assess/evaluate cause of self-care deficits   - Assess range of motion  - Assess patient's mobility  - Assess patient's need for assistive devices and provide as appropriate  - Encourage maximum independence but intervene and supervise when necessary  - Involve family in performance of ADLs  - Assess for home care needs following discharge   - Consider OT consult to assist with ADL evaluation and planning for discharge  - Provide patient education as appropriate  Outcome: Progressing     Problem: Nutrition/Hydration-ADULT  Goal: Nutrient/Hydration intake appropriate for improving, restoring or maintaining nutritional needs  Description: Monitor and assess patient's nutrition/hydration status for malnutrition. Collaborate with interdisciplinary team and initiate plan and interventions as ordered.  Monitor patient's weight and dietary intake as ordered or per policy. Utilize nutrition screening tool and intervene as necessary. Determine patient's food preferences and provide high-protein, high-caloric foods as appropriate.     INTERVENTIONS:  - Monitor oral intake, urinary output, labs, and treatment plans  - Assess nutrition and hydration status and recommend course of action  - Evaluate amount of meals eaten  - Assist patient with eating if necessary   - Allow adequate time for meals  - Recommend/  encourage appropriate diets, oral nutritional supplements, and vitamin/mineral supplements  - Order, calculate, and assess calorie counts as needed  - Recommend, monitor, and adjust tube feedings and TPN/PPN based on assessed needs  - Assess need for intravenous fluids  - Provide specific nutrition/hydration education as appropriate  - Include patient/family/caregiver in decisions related to nutrition  Outcome: Progressing

## 2024-07-17 NOTE — PLAN OF CARE
Problem: OCCUPATIONAL THERAPY ADULT  Goal: Performs self-care activities at highest level of function for planned discharge setting.  See evaluation for individualized goals.  Description: Treatment Interventions: ADL retraining, Functional transfer training, Endurance training, Cognitive reorientation, Patient/family training, Equipment evaluation/education, Compensatory technique education, Energy conservation, Activityengagement          See flowsheet documentation for full assessment, interventions and recommendations.   Outcome: Progressing  Note: Limitation: Decreased UE strength, Decreased cognition, Decreased endurance, Decreased self-care trans, Decreased high-level ADLs  Prognosis: Fair  Assessment: Patient participated in Skilled OT session this date with interventions consisting of functional transfer training and Energy Conservation techniques . Patient agreeable to OT treatment session, upon arrival patient was found seated OOB to Recliner, alert, responsive , and in no apparent distress.  Patient transfers sit to stand with CGA x 1 and is impulsive with transfers. Patient then completes functional mobility x 140 feet with Min a x 2. Patient noted to have multiple episodes of LOB during mobility, requiring assist of therapist to correct. Patient requires Mod A x 2 to sit as patient went to sit prematurely, barely on surface of recliner and required assist og therapist to reposition and boost in recliner. Session ended with patient refusal for further activity at this time. Patient seated OOB to recliner, all needs met, and call bell within reach. In comparison to previous session, patient with improvements in endurance and functional mobility. Patient requiring frequent re direction, verbal cues for safety, verbal cues for correct technique, verbal cues for pacing thru activity steps, and one step directives. Patient performance  demonstrated good carryover of learned techniques and strategies to  facilitate safety during functional tasks. Patient continues to be functioning below baseline level, occupational performance remains limited secondary to factors listed above and increased risk for falls and injury.   From OT standpoint, recommendation at time of d/c would be Level II (Moderate Resource Intensity).  Patient to benefit from continued Occupational Therapy treatment while in the hospital to address deficits as defined above and maximize level of functional independence with ADLs and functional mobility in order to return to PLOF.     Rehab Resource Intensity Level, OT: II (Moderate Resource Intensity)

## 2024-07-17 NOTE — PLAN OF CARE
Problem: Potential for Falls  Goal: Patient will remain free of falls  Description: INTERVENTIONS:  - Educate patient/family on patient safety including physical limitations  - Instruct patient to call for assistance with activity   - Consult OT/PT to assist with strengthening/mobility   - Keep Call bell within reach  - Keep bed low and locked with side rails adjusted as appropriate  - Keep care items and personal belongings within reach  - Initiate and maintain comfort rounds  - Make Fall Risk Sign visible to staff  - Offer Toileting every 2 Hours, in advance of need  - Initiate/Maintain bed alarm  - Obtain necessary fall risk management equipment: walker, alarms  - Apply yellow socks and bracelet for high fall risk patients  - Consider moving patient to room near nurses station  Outcome: Progressing     Problem: Prexisting or High Potential for Compromised Skin Integrity  Goal: Skin integrity is maintained or improved  Description: INTERVENTIONS:  - Identify patients at risk for skin breakdown  - Assess and monitor skin integrity  - Assess and monitor nutrition and hydration status  - Monitor labs   - Assess for incontinence   - Turn and reposition patient  - Assist with mobility/ambulation  - Relieve pressure over bony prominences  - Avoid friction and shearing  - Provide appropriate hygiene as needed including keeping skin clean and dry  - Evaluate need for skin moisturizer/barrier cream  - Collaborate with interdisciplinary team   - Patient/family teaching  - Consider wound care consult   Outcome: Progressing     Problem: PAIN - ADULT  Goal: Verbalizes/displays adequate comfort level or baseline comfort level  Description: Interventions:  - Encourage patient to monitor pain and request assistance  - Assess pain using appropriate pain scale  - Administer analgesics based on type and severity of pain and evaluate response  - Implement non-pharmacological measures as appropriate and evaluate response  - Consider  cultural and social influences on pain and pain management  - Notify physician/advanced practitioner if interventions unsuccessful or patient reports new pain  Outcome: Progressing     Problem: INFECTION - ADULT  Goal: Absence or prevention of progression during hospitalization  Description: INTERVENTIONS:  - Assess and monitor for signs and symptoms of infection  - Monitor lab/diagnostic results  - Monitor all insertion sites, i.e. indwelling lines, tubes, and drains  - Monitor endotracheal if appropriate and nasal secretions for changes in amount and color  - [place] appropriate cooling/warming therapies per order  - Administer medications as ordered  - Instruct and encourage patient and family to use good hand hygiene technique  - Identify and instruct in appropriate isolation precautions for identified infection/condition  Outcome: Progressing     Problem: SAFETY ADULT  Goal: Patient will remain free of falls  Description: INTERVENTIONS:  - Educate patient/family on patient safety including physical limitations  - Instruct patient to call for assistance with activity   - Consult OT/PT to assist with strengthening/mobility   - Keep Call bell within reach  - Keep bed low and locked with side rails adjusted as appropriate  - Keep care items and personal belongings within reach  - Initiate and maintain comfort rounds  - Make Fall Risk Sign visible to staff  - Offer Toileting every 2 Hours, in advance of need  - Initiate/Maintain bed alarm  - Obtain necessary fall risk management equipment: alarms, walker  - Apply yellow socks and bracelet for high fall risk patients  - Consider moving patient to room near nurses station  Outcome: Progressing  Goal: Maintain or return to baseline ADL function  Description: INTERVENTIONS:  -  Assess patient's ability to carry out ADLs; assess patient's baseline for ADL function and identify physical deficits which impact ability to perform ADLs (bathing, care of mouth/teeth,  toileting, grooming, dressing, etc.)  - Assess/evaluate cause of self-care deficits   - Assess range of motion  - Assess patient's mobility; develop plan if impaired  - Assess patient's need for assistive devices and provide as appropriate  - Encourage maximum independence but intervene and supervise when necessary  - Involve family in performance of ADLs  - Assess for home care needs following discharge   - Consider OT consult to assist with ADL evaluation and planning for discharge  - Provide patient education as appropriate  Outcome: Progressing  Goal: Maintains/Returns to pre admission functional level  Description: INTERVENTIONS:  - Perform AM-PAC 6 Click Basic Mobility/ Daily Activity assessment daily.  - Set and communicate daily mobility goal to care team and patient/family/caregiver.   - Collaborate with rehabilitation services on mobility goals if consulted  - Perform Range of Motion 3 times a day.  - Reposition patient every 2 hours.  - Dangle patient 3 times a day  - Stand patient 3 times a day  - Ambulate patient 3 times a day  - Out of bed to chair 3 times a day   - Out of bed for meals 3 times a day  - Out of bed for toileting  - Record patient progress and toleration of activity level   Outcome: Progressing     Problem: DISCHARGE PLANNING  Goal: Discharge to home or other facility with appropriate resources  Description: INTERVENTIONS:  - Identify barriers to discharge w/patient and caregiver  - Arrange for needed discharge resources and transportation as appropriate  - Identify discharge learning needs (meds, wound care, etc.)  - Arrange for interpretive services to assist at discharge as needed  - Refer to Case Management Department for coordinating discharge planning if the patient needs post-hospital services based on physician/advanced practitioner order or complex needs related to functional status, cognitive ability, or social support system  Outcome: Progressing     Problem: Knowledge  Deficit  Goal: Patient/family/caregiver demonstrates understanding of disease process, treatment plan, medications, and discharge instructions  Description: Complete learning assessment and assess knowledge base.  Interventions:  - Provide teaching at level of understanding  - Provide teaching via preferred learning methods  Outcome: Progressing     Problem: GENITOURINARY - ADULT  Goal: Maintains or returns to baseline urinary function  Description: INTERVENTIONS:  - Assess urinary function  - Encourage oral fluids to ensure adequate hydration if ordered  - Administer IV fluids as ordered to ensure adequate hydration  - Administer ordered medications as needed  - Offer frequent toileting  - Follow urinary retention protocol if ordered  Outcome: Progressing  Goal: Absence of urinary retention  Description: INTERVENTIONS:  - Assess patient’s ability to void and empty bladder  - Monitor I/O  - Bladder scan as needed  - Discuss with physician/AP medications to alleviate retention as needed  - Discuss catheterization for long term situations as appropriate  Outcome: Progressing     Problem: METABOLIC, FLUID AND ELECTROLYTES - ADULT  Goal: Electrolytes maintained within normal limits  Description: INTERVENTIONS:  - Monitor labs and assess patient for signs and symptoms of electrolyte imbalances  - Administer electrolyte replacement as ordered  - Monitor response to electrolyte replacements, including repeat lab results as appropriate  - Instruct patient on fluid and nutrition as appropriate  Outcome: Progressing  Goal: Fluid balance maintained  Description: INTERVENTIONS:  - Monitor labs   - Monitor I/O and WT  - Instruct patient on fluid and nutrition as appropriate  - Assess for signs & symptoms of volume excess or deficit  Outcome: Progressing  Goal: Glucose maintained within target range  Description: INTERVENTIONS:  - Monitor Blood Glucose as ordered  - Assess for signs and symptoms of hyperglycemia and  hypoglycemia  - Administer ordered medications to maintain glucose within target range  - Assess nutritional intake and initiate nutrition service referral as needed  Outcome: Progressing     Problem: MUSCULOSKELETAL - ADULT  Goal: Maintain or return mobility to safest level of function  Description: INTERVENTIONS:  - Assess patient's ability to carry out ADLs; assess patient's baseline for ADL function and identify physical deficits which impact ability to perform ADLs (bathing, care of mouth/teeth, toileting, grooming, dressing, etc.)  - Assess/evaluate cause of self-care deficits   - Assess range of motion  - Assess patient's mobility  - Assess patient's need for assistive devices and provide as appropriate  - Encourage maximum independence but intervene and supervise when necessary  - Involve family in performance of ADLs  - Assess for home care needs following discharge   - Consider OT consult to assist with ADL evaluation and planning for discharge  - Provide patient education as appropriate  Outcome: Progressing     Problem: Nutrition/Hydration-ADULT  Goal: Nutrient/Hydration intake appropriate for improving, restoring or maintaining nutritional needs  Description: Monitor and assess patient's nutrition/hydration status for malnutrition. Collaborate with interdisciplinary team and initiate plan and interventions as ordered.  Monitor patient's weight and dietary intake as ordered or per policy. Utilize nutrition screening tool and intervene as necessary. Determine patient's food preferences and provide high-protein, high-caloric foods as appropriate.     INTERVENTIONS:  - Monitor oral intake, urinary output, labs, and treatment plans  - Assess nutrition and hydration status and recommend course of action  - Evaluate amount of meals eaten  - Assist patient with eating if necessary   - Allow adequate time for meals  - Recommend/ encourage appropriate diets, oral nutritional supplements, and vitamin/mineral  supplements  - Order, calculate, and assess calorie counts as needed  - Recommend, monitor, and adjust tube feedings and TPN/PPN based on assessed needs  - Assess need for intravenous fluids  - Provide specific nutrition/hydration education as appropriate  - Include patient/family/caregiver in decisions related to nutrition  Outcome: Progressing

## 2024-07-17 NOTE — PROGRESS NOTES
[facility]  Progress Note  Name: Thomas Chase I  MRN: [id number]  Unit/Bed#: -01 I Date of Admission: 7/13/2024   Date of Service: 7/17/2024 I Hospital Day: 3    Assessment & Plan   * Aspiration pneumonia (HCC)  Assessment & Plan  Presenting with increased confusion, anxiety and balance issues  Initial chest x-ray was concerning for right lower lobe pneumonia   Concern is for the possibility of an aspiration event, or ongoing chronic microaspiration  Last fever documented 7/14 of 101.3 °F  Testing for COVID-19, influenza, and respiratory syncytial virus is negative  Urinary antigens negative   Blood cultures no growth at 24 hours  Patient was initially put on ceftriaxone, and Flagyl - switched to Augmentin 7/15 D2, with plan to continue 7 day course  Speech recommends pureed diet with honey thick liquids   Video barium swallow 7/16: patient aspirated before, during and after swallow  Recommending strict NPO and possible alternative means of nutrition   Continued goals of care discussion   Full supervision with ice chips for pleasure   GI consulted   Holding Lovenox for tentative plan for PEG   PT, OT recommending level II on discharge     Full length discussion (SLIM, CM, speech, GI) had with wife, daughters, and son regarding options for strict NPO status for ongoing aspiration. Family is still deciding whether they will continue with PEG vs. Hospice    H/O orthostatic hypotension  Assessment & Plan  Midodrine on hold for NPO status     UTI (urinary tract infection)  Assessment & Plan  Patient was recently diagnosed with a Proteus mirabilis UTI on 7/5/2024  Patient was taking Bactrim at home  Started on ceftriaxone, and transition to Flagyl to cover for aspiration and UTI.  After video barium swallow patient is strict NPO.  Augmentin discontinued and ceftriaxone restarted - D5 abx     Acute metabolic encephalopathy  Assessment & Plan  Likely secondary to infectious etiology and PMH  dementia   Initial CT head-no acute pathology  Continue close monitoring  Patient is otherwise calm and cooperative at this time    Benign prostatic hyperplasia without lower urinary tract symptoms  Assessment & Plan  Tamsulosin on hold for NPO status     Bilateral lower extremity weakness  Assessment & Plan  PT/OT recommending level II  Clinically suspect that the patient might benefit from rehab  Of note, the patient's family are interested in the same    Dysphagia  Assessment & Plan  Formal speech evaluation recommending puréed diet and honey thick liquid  VBS 7/16 - see aspiration for full plan     H/O traumatic subdural hematoma  Assessment & Plan  No acute changes on imaging  Continue monitoring    Sensorineural hearing loss (SNHL), bilateral  Assessment & Plan  Patient is very hard of hearing      Diastolic dysfunction  Assessment & Plan  Without exacerbation  Patient examines euvolemic, continue close monitoring  Statin on hold for NPO status     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HCC)  Assessment & Plan  CT head with no acute changes consistent with this, chronic vascular changes  Home sertraline on hold for NPO status          VTE Pharmacologic Prophylaxis: VTE Score: 5  Has been receiving lovenox, on hold for possible PEG placement     Mobility:   Basic Mobility Inpatient Raw Score: 13  JH-HLM Goal: 4: Move to chair/commode  JH-HLM Achieved: 7: Walk 25 feet or more  JH-HLM Goal achieved. Continue to encourage appropriate mobility.    Patient Centered Rounds: I performed bedside rounds with nursing staff today.   Discussions with Specialists or Other Care Team Provider: Speech, GI    Education and Discussions with Family / Patient: Updated  (wife and daughter) via phone.    Total Time Spent on Date of Encounter in care of patient: This time was spent on one or more of the following: performing physical exam; counseling and coordination of care; obtaining or reviewing history; documenting in the  medical record; reviewing/ordering tests, medications or procedures; communicating with other healthcare professionals and discussing with patient's family/caregivers.    Current Length of Stay: 3 day(s)  Current Patient Status: Inpatient   Certification Statement: The patient will continue to require additional inpatient hospital stay due to continued safe discharge planning  Discharge Plan: Anticipate discharge in 24-48 hrs to discharge location to be determined pending rehab evaluations.    Code Status: Level 1 - Full Code    Subjective:   Seen and examined. No acute events overnight.    Objective:     Vitals:   Temp (24hrs), Av.3 °F (36.8 °C), Min:97.9 °F (36.6 °C), Max:98.8 °F (37.1 °C)    Temp:  [97.9 °F (36.6 °C)-98.8 °F (37.1 °C)] 98 °F (36.7 °C)  HR:  [69-80] 73  Resp:  [14-17] 14  BP: (100-119)/(59-71) 100/59  SpO2:  [95 %-98 %] 97 %  Body mass index is 19.49 kg/m².     Input and Output Summary (last 24 hours):     Intake/Output Summary (Last 24 hours) at 2024 1640  Last data filed at 2024 1201  Gross per 24 hour   Intake 0 ml   Output 400 ml   Net -400 ml       Physical Exam:   Physical Exam  Constitutional:       General: He is not in acute distress.     Appearance: He is ill-appearing.   HENT:      Mouth/Throat:      Mouth: Mucous membranes are moist.   Eyes:      Conjunctiva/sclera: Conjunctivae normal.   Musculoskeletal:      Right lower leg: No edema.      Left lower leg: No edema.   Skin:     General: Skin is warm and dry.   Neurological:      Mental Status: Mental status is at baseline.          Additional Data:     Labs:  Results from last 7 days   Lab Units 24  0549 24  0524   WBC Thousand/uL 7.35 6.44   HEMOGLOBIN g/dL 12.8 12.0   HEMATOCRIT % 39.6 37.2   PLATELETS Thousands/uL 489* 424*   SEGS PCT %  --  74   LYMPHO PCT %  --  16   MONO PCT %  --  8   EOS PCT %  --  1     Results from last 7 days   Lab Units 24  0549 24  0455 24  2120   SODIUM mmol/L  137   < > 137   POTASSIUM mmol/L 4.2   < > 4.7   CHLORIDE mmol/L 103   < > 104   CO2 mmol/L 28   < > 27   BUN mg/dL 17   < > 21   CREATININE mg/dL 0.62   < > 0.82   ANION GAP mmol/L 6   < > 6   CALCIUM mg/dL 9.1   < > 9.0   ALBUMIN g/dL  --   --  3.6   TOTAL BILIRUBIN mg/dL  --   --  0.29   ALK PHOS U/L  --   --  93   ALT U/L  --   --  20   AST U/L  --   --  19   GLUCOSE RANDOM mg/dL 91   < > 93    < > = values in this interval not displayed.     Results from last 7 days   Lab Units 07/13/24  2120   INR  1.04     Results from last 7 days   Lab Units 07/15/24  0737   POC GLUCOSE mg/dl 111         Results from last 7 days   Lab Units 07/13/24  2244 07/13/24  2120   LACTIC ACID mmol/L 0.7  --    PROCALCITONIN ng/ml  --  <0.05       Lines/Drains:  Invasive Devices       Peripheral Intravenous Line  Duration             Peripheral IV 07/13/24 Dorsal (posterior);Left Forearm 3 days                          Imaging: No pertinent imaging reviewed.    Recent Cultures (last 7 days):   Results from last 7 days   Lab Units 07/16/24  0413 07/13/24  2244   BLOOD CULTURE   --  No Growth at 72 hrs.  No Growth at 72 hrs.   LEGIONELLA URINARY ANTIGEN  Negative  --        Last 24 Hours Medication List:   Current Facility-Administered Medications   Medication Dose Route Frequency Provider Last Rate    acetaminophen  325 mg Rectal Q4H PRN Danitza Duarte PA-C      bisacodyl  10 mg Rectal Daily PRN Danitza Duarte PA-C      cefTRIAXone  1,000 mg Intravenous Q24H Danitza Duarte PA-C 1,000 mg (07/17/24 1555)    LORazepam  0.5 mg Intravenous Q6H PRN Danitza Duarte PA-C      multi-electrolyte  75 mL/hr Intravenous Continuous Danitza Duarte PA-C 75 mL/hr (07/17/24 1227)        Today, Patient Was Seen By: Danitza Duarte PA-C    **Please Note: This note may have been constructed using a voice recognition system.**

## 2024-07-17 NOTE — ASSESSMENT & PLAN NOTE
Patient was recently diagnosed with a Proteus mirabilis UTI on 7/5/2024  Patient was taking Bactrim at home  Started on ceftriaxone, and transition to Flagyl to cover for aspiration and UTI.  After video barium swallow patient is strict NPO.  Augmentin discontinued and ceftriaxone restarted - D5 abx

## 2024-07-17 NOTE — NURSING NOTE
Patient briefed checked and dry. Repositioned to right side with pillows. Patient appears comfortable and is resting.

## 2024-07-17 NOTE — CONSULTS
[facility] - Inpatient Consultation    PATIENT INFORMATION      Thomas Chase 83 y.o. male MRN: [id number]  Unit/Bed#: -01 Encounter: [id number]  PCP: RONY Coe  Date of Admission:  7/13/2024  Date of Consultation: 07/17/24  Requesting Physician: Tameka Zamora MD       ASSESSMENT & PLAN     Thomas Chase is a 83 y.o. old male with PMHx of Ischemic vascular dementia, PVD, malnutrition, subdural hematoma s/p craniotomy February 2024 who presented with inability to tolerate oral intake.  GI was consulted for evaluation of PEG tube placement.    Inability to tolerate oral intake  Aspiration pneumonia  Patient is presenting with inability to tolerate oral intake.  Patient was seen by speech pathologist who recommended n.p.o. and consideration of PEG tube to maintain nutrition. Family discussion was held today regarding pros and cons of placement of a PEG tube.    -Discussion held with patient's family regarding PEG tube placement, they would like to take time to decide if they want to proceed with placement.  -If PEG tube will be placed please ensure that patient is n.p.o. and hold DVT prophylaxis.      REASON FOR CONSULTATION      Inability to tolerate oral intake      HISTORY OF PRESENT ILLNESS      Thomas Chase is a 83 y.o. old male with PMHx of Ischemic vascular dementia, PVD, malnutrition, subdural hematoma s/p craniotomy February 2024 who presented with inability to tolerate oral intake. GI was consulted for evaluation of PEG tube placement.  History was obtained via speaking to patient's family.  Patient was seen at bedside who does not follow any commands and is nonverbal.  Per patient's family patient has been having worsening of functional status as well as ability to tolerate oral intake.  Patient has been readmitted for aspiration pneumonia.  When patient was seen he was awake however was unable to follow any commands or understand when  spoken to him.    Discussion was held with family regarding the pros and cons of placing a PEG tube.  Patient's family is goal was to keep the patient's comfort in mind.      REVIEW OF SYSTEMS     CONSTITUTIONAL: Denies any fever, chills, rigors, and weight loss  HEENT: No earache or tinnitus, denies hearing loss or visual disturbances  CARDIOVASCULAR: No chest pain or palpitations   RESPIRATORY: Denies any cough, hemoptysis, shortness of breath or dyspnea on exertion  GASTROINTESTINAL: As noted in the History of Present Illness   GENITOURINARY: No problems with urination, denies any hematuria or dysuria  NEUROLOGIC: No dizziness or vertigo, denies headaches   MUSCULOSKELETAL: Denies any muscle or joint pain   SKIN: Denies skin rashes or itching   ENDOCRINE: Denies excessive thirst, denies intolerance to heat or cold  PSYCHOSOCIAL: Denies depression or anxiety, denies any recent memory loss     PAST MEDICAL & SURGICAL HISTORY      Past Medical History:   Diagnosis Date    Abnormal echocardiogram 06/27/2022    Arthritis     EKG abnormalities 06/19/2022    Encounter for general adult medical examination without abnormal findings 03/20/2019    Fall 11/03/2022    Hearing loss 04/08/2009    Hyperlipidemia     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    Macular degeneration, wet (HCC)     Urinary retention 06/02/2022       Past Surgical History:   Procedure Laterality Date    BRAIN HEMATOMA EVACUATION Left 05/28/2022    Procedure: left CRANIOTOMY FOR SUBDURAL HEMATOMA;  Surgeon: Chris Watts MD;  Location: [facility];  Service: Neurosurgery    CARPAL TUNNEL RELEASE      HERNIA REPAIR Right     inguinal    IR CEREBRAL ANGIOGRAPHY / INTERVENTION  06/02/2022    LA COCHLEAR DEVICE IMPLANTATION W/WO MASTOIDECTOMY Left 1/17/2023    Procedure: LEFT COCHLEAR IMPLANTATION WITH MASTOIDECTOMY AND FACIAL NERVE MONITORING WITH AUDIOMETRIC TESTING OF THE IMPLANT;  Surgeon: Susie Tuttle MD;  Location: [facility];   Service: ENT    MD NEUROPLASTY &/TRANSPOS MEDIAN NRV CARPAL OSWALDE Right 09/20/2021    Procedure: RELEASE CARPAL TUNNEL;  Surgeon: Bruno Segovia MD;  Location: [facility];  Service: Orthopedics       MEDICATIONS & ALLERGIES       Medications:     Medications Prior to Admission:     acetaminophen (TYLENOL) 325 mg tablet    cyanocobalamin (VITAMIN B-12) 500 MCG tablet    loratadine (CLARITIN) 10 mg tablet    LORazepam (Ativan) 0.5 mg tablet    melatonin 3 mg    midodrine (PROAMATINE) 5 mg tablet    Multiple Vitamin (multivitamin) tablet    nystatin (MYCOSTATIN) 500,000 units/5 mL suspension    polyethylene glycol (MIRALAX) 17 g packet    QUEtiapine (SEROquel) 25 mg tablet    senna (SENOKOT) 8.6 mg    sertraline (ZOLOFT) 25 mg tablet    simvastatin (ZOCOR) 20 mg tablet    tamsulosin (FLOMAX) 0.4 mg  Current Facility-Administered Medications   Medication Dose Route Frequency    acetaminophen (TYLENOL) rectal suppository 325 mg  325 mg Rectal Q4H PRN    bisacodyl (DULCOLAX) rectal suppository 10 mg  10 mg Rectal Daily PRN    cefTRIAXone (ROCEPHIN) IVPB (premix in dextrose) 1,000 mg 50 mL  1,000 mg Intravenous Q24H    LORazepam (ATIVAN) injection 0.5 mg  0.5 mg Intravenous Q6H PRN    multi-electrolyte (PLASMALYTE-A/ISOLYTE-S PH 7.4) IV solution  75 mL/hr Intravenous Continuous       Allergies:   No Known Allergies    SOCIAL HISTORY      Social History     Marital Status: /Civil Union    Substance Use History:   Social History     Substance and Sexual Activity   Alcohol Use Not Currently    Alcohol/week: 3.0 standard drinks of alcohol    Types: 3 Cans of beer per week     Social History     Tobacco Use   Smoking Status Former   Smokeless Tobacco Never   Tobacco Comments    Smoked as a teenager     Social History     Substance and Sexual Activity   Drug Use No       FAMILY HISTORY      Family History   Problem Relation Age of Onset    Cancer Mother     Hyperlipidemia Father     Coronary artery disease Father   "      PHYSICAL EXAM     Objective   Blood pressure 100/59, pulse 73, temperature 98 °F (36.7 °C), resp. rate 14, height 5' 10\" (1.778 m), weight 61.6 kg (135 lb 12.9 oz), SpO2 97%. Body mass index is 19.49 kg/m².    Intake/Output Summary (Last 24 hours) at 7/17/2024 1628  Last data filed at 7/17/2024 1201  Gross per 24 hour   Intake 0 ml   Output 400 ml   Net -400 ml       General Appearance:   Alert, cooperative, no distress   HEENT:   Normocephalic, atraumatic, anicteric     Neck:   Supple, symmetrical, trachea midline   Lungs:   Equal chest rise, respirations unlabored    Heart:   Regular rate and rhythm   Abdomen:   Soft nontender   Rectal:   Deferred    Extremities:   No cyanosis, clubbing or edema    Neuro:   Moves all 4 extremities    Skin:   No jaundice, rashes, or lesions      ADDITIONAL DATA     Lab Results:     Results from last 7 days   Lab Units 07/17/24  0549 07/16/24  0524   WBC Thousand/uL 7.35 6.44   HEMOGLOBIN g/dL 12.8 12.0   HEMATOCRIT % 39.6 37.2   PLATELETS Thousands/uL 489* 424*   SEGS PCT %  --  74   LYMPHO PCT %  --  16   MONO PCT %  --  8   EOS PCT %  --  1     Results from last 7 days   Lab Units 07/17/24  0549 07/14/24  0455 07/13/24  2120   POTASSIUM mmol/L 4.2   < > 4.7   CHLORIDE mmol/L 103   < > 104   CO2 mmol/L 28   < > 27   BUN mg/dL 17   < > 21   CREATININE mg/dL 0.62   < > 0.82   CALCIUM mg/dL 9.1   < > 9.0   ALK PHOS U/L  --   --  93   ALT U/L  --   --  20   AST U/L  --   --  19    < > = values in this interval not displayed.     Results from last 7 days   Lab Units 07/13/24  2120   INR  1.04       Imaging:    FL barium swallow video w speech    Result Date: 7/16/2024  Narrative: A video barium swallow study was performed by the Department of Speech Pathology. Please refer to the report for the official interpretation. The images are stored for archival purposes only. Study images were not formally reviewed by the Radiology Department.    FL barium swallow video w " speech    Result Date: 7/16/2024  Narrative: SIRI Skelton     7/16/2024  5:52 PM                                  Video Swallow Study Patient Name: Thomas Chase Today's Date: 7/16/2024   Past Medical History Past Medical History: Diagnosis Date  Abnormal echocardiogram 06/27/2022  Arthritis   EKG abnormalities 06/19/2022  Encounter for general adult medical examination without abnormal findings 03/20/2019  Fall 11/03/2022  Hearing loss 04/08/2009  Hyperlipidemia   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  Macular degeneration, wet (HCC)   Urinary retention 06/02/2022  Past Surgical History Past Surgical History: Procedure Laterality Date  BRAIN HEMATOMA EVACUATION Left 05/28/2022  Procedure: left CRANIOTOMY FOR SUBDURAL HEMATOMA;  Surgeon: Chris Watts MD;  Location:  MAIN OR;  Service: Neurosurgery  CARPAL TUNNEL RELEASE    HERNIA REPAIR Right   inguinal  IR CEREBRAL ANGIOGRAPHY / INTERVENTION  06/02/2022  NE COCHLEAR DEVICE IMPLANTATION W/WO MASTOIDECTOMY Left 1/17/2023  Procedure: LEFT COCHLEAR IMPLANTATION WITH MASTOIDECTOMY AND FACIAL NERVE MONITORING WITH AUDIOMETRIC TESTING OF THE IMPLANT;  Surgeon: Susie Tuttle MD;  Location:  MAIN OR;  Service: ENT  NE NEUROPLASTY &/TRANSPOS MEDIAN NRV CARPAL TUNNE Right 09/20/2021  Procedure: RELEASE CARPAL TUNNEL;  Surgeon: Bruno Segovia MD;  Location: [facility];  Service: Orthopedics Summary: Images are on PACS for review. Oral Phase :Pt presented with mild oral dysphagia. Bolus control, formation, and transfer were min reduced. Reduced bolus control resulted in spill to pharynx. Pharyngeal Phase : Pt presented with severe pharyngeal dysphagia. Swallow initiation is delayed resulting in premature spill to the valleculae. Hyoid elevation, laryngeal excursion, and pharyngeal constriction is reduced.  Epiglottic inversion is incomplete. Airway closure was reduced. Pharyngeal retention in valleculae, along AE folds, in pyriforms, and in UES.  Phillip aspiration observed with thin liquids before during and after swallow. Mod aspiration with nectar and honey thick liquids. Secondary swallow to clear retention was mostly absent, if pt was able to initiate swallow was severely delayed. Retropulsion resulted in aspiration of pharyngeal residue. Pt inconsistent cough response to aspiration. When pt did cough it was harsh and extended however unsuccessful in clearing residue. Per Esophageal screen View is limited as primarily focus on pharyngeal stage appeared adequate for intake. Observations: Pt is [place]. He does have hx of cognitive impairment. Pt does utilize white board for simple direction following however primarily unable to follow commands when provided verbal, visual, and model cue. ST did not administer additional solids or 13mm pill as safety concern Recommendations: Diet:NPO ice chips for pleasure 1-2  at a time with nursing supervision. Consider alternate means of nutrition as appropriate. Consider GOC. Meds: non oral Upright position F/u ST tx:yes Therapy Prognosis:guarded Prognosis considerations:age, medical status **Full Supervision w/ ice chips for pleasure. Oral care prior to trials. Discontinue If increased difficulties with oral control, no initiation of swallow , excessive coughing, increased secretions, or excessive gurgly voice with no success cued cough throat clear Aspiration Precautions Reflux Precautions Consider consult with: Nutrition, GI Results reviewed with: pt, nursing, family, PA-C Aspiration precautions posted. If a dedicated assessment of the esophagus is desired, consider esophagram/barium swallow or EGD. Goals: Dysphagia LTG -Patient will demonstrate safe and effective oral intake (without overt s/s significant oral/pharyngeal dysphagia including s/s penetration or aspiration) for the highest appropriate diet level. -Patient’s caregiver will demonstrate understanding and adherence to recommended diet and use of (or prompting  for use of) compensatory strategies. Patient's goal:none stated H&P/Admit info, pertinent provider notes/procedures/studies/PMH:(copied and placed in this report) Thomas Chase is a 83 y.o. male with a PMH of vascular dementia, severe protein calorie malnutrition, stents, insomnia, peripheral vascular disease, prior history of aspiration of right lower lobe pneumonia, prior subdural hematoma: February 2024 s/p craniotomy 2 years ago, dysphagia, hearing impairment who presents with family, with a chief complaint two days  of generalized increase weakness, increased confusion with anxiety and balance issues.  Unfortunately patient is unable to provide any information. Patient's daughter and wife at the bedside states that the patient has been noticed more disoriented than at his baseline with unbalanced gait.  He appeared very weak to the point where 2 people could not hold him.  They noticed patient was coughing more than usual.  Family reports history of dysphagia. Patient also taking Bactrim for UTI. Family reports having increasing challenges taking care of the patient at home who has 24-hour care concerned that they are unable to take care  and ask in  assistance in placement of the patient in a skilled nursing facility no recent falls.  Patient does not normally speak is unable to communicate effectively, communicates by writing on the board, is severely hearing impaired. Chest x-ray showed right lower lobe pneumonia.  Labs unrevealing. On my evaluation patient appears not in acute distress, hemodynamically stable.  Family denies any fevers, nausea, vomiting, diarrhea. Special Studies CT BRAIN - WITHOUT CONTRAST 07/13/2024 No acute intracranial abnormality. Chronic microangiopathic changes. XR CHEST PORTABLE 07/13/2024 Mild right base opacity which could be due to pneumonia. Code Status:  Level 1 Full code Previous VBS: Video Swallow Study Summary: Images are on PACS for review. Oral Phase : Pt presented with  mild oral dysphagia. Bolus control, formation, and transfer were mildly reduced. Mild premature spill occurred. Trace posterior base of tongue residue. Pharyngeal Phase : Pt presented with mod/severe pharyngeal dysphagia. Swallow initiation was delayed. Premature spillage occurred to the valleculae with all trials. With trials of thin liquids premature spill occurred to the pyriforms. Base of tongue retraction was reduced. Hyoid laryngeal elevation was variable WFL vs mild/mod reduced. Pharyngeal constriction and air way closure were reduced. Epiglottic inversion was incomplete. Mod to Max vallecular retention occurred with all consistencies. Pyriform retention occurred with all consistencies. Phillip aspiration occurred during and after the swallow with thin liquids via cup/straw. Pt had no sensation of aspiration events. Increased difficulty following verbal and written commands due to cognitive status and Eek.  Cued cough appeared weak and ineffective in clearing aspirated residue. Pt will independently initiate multiple swallows (3-4) to clear residue however min successful in clearing residue. Per Esophageal screen All material cleared adequately  Observations: Pt Eek and hx of cognitive deficits. ST provided verbal and written directions however increased difficulty following. ST did administer solids or 13mm pill as safety concern. Recommendations: Diet: Dysphagia 1 pureed Liquids: Nectar Strategies: slow rate, small sips, alternate liquids with solids, double swallow and effortful swallow. Upright position F/u ST tx: yes Full/Supervision Aspiration Precautions Reflux Precautions Consider consult with: Nutrition Results reviewed with: pt, OP SLP Aspiration precautions posted. If a dedicated assessment of the esophagus is desired, consider esophagram/barium swallow or EGD. Precautions : Fall Food allergies:  No known Current diet:  Dysphagia 1 pureed and htl Premorbid diet:  Dysphagia 1 pureed and ntl Dentition   Adequate  Oral Mech  WNL O2 requirements:  Room air  Vocal Quality/Speech WNL Cognitive status:  Alert  Consistencies administered: Barium laden thin liquids, nectar, and honey thick liquids. Liquids were administered by cup and straw. Did not administer additional solids as safety concern. Pt was seated laterally at 90 degrees. Pt  unable to be viewed in AP position due to transfer status.      CT head without contrast    Result Date: 7/14/2024  Narrative: CT BRAIN - WITHOUT CONTRAST INDICATION:   change in mental status. COMPARISON: 3/16/2024. TECHNIQUE:  CT examination of the brain was performed.  Multiplanar 2D reformatted images were created from the source data. Radiation dose length product (DLP) for this visit:  978.3 mGy-cm .  This examination, like all CT scans performed in the [facility], was performed utilizing techniques to minimize radiation dose exposure, including the use of iterative reconstruction and automated exposure control. IMAGE QUALITY:  Diagnostic. FINDINGS: PARENCHYMA: Decreased attenuation is noted in periventricular and subcortical white matter demonstrating an appearance that is statistically most likely to represent mild microangiopathic change. No CT signs of acute infarction.  No intracranial mass, mass effect or midline shift.  No acute parenchymal hemorrhage. VENTRICLES AND EXTRA-AXIAL SPACES:  Ventricles and extra-axial CSF spaces are prominent commensurate with the degree of volume loss.  No hydrocephalus.  No acute extra-axial hemorrhage. VISUALIZED ORBITS: Normal visualized orbits. PARANASAL SINUSES: Normal visualized paranasal sinuses. CALVARIUM AND EXTRACRANIAL SOFT TISSUES: Left mastoidectomy with cochlear implant..     Impression: No acute intracranial abnormality.  Chronic microangiopathic changes. Workstation performed: [id number]     XR chest 1 view portable    Result Date: 7/13/2024  Narrative: XR CHEST PORTABLE INDICATION: change in mental status.  COMPARISON: CXR 4/18/2024 and 4/6/2024 and CTA neck 2/17/2024. FINDINGS: Mild right base opacity. No pneumothorax or pleural effusion. Normal cardiomediastinal silhouette. Bones are unremarkable for age. Normal upper abdomen.     Impression: Mild right base opacity which could be due to pneumonia. Workstation performed: [id number]       EKG, Pathology, and Other Studies Reviewed on Admission:   EKG: Reviewed      Counseling / Coordination of Care Time: 30 total mins spent in consult. Greater than 50% of total time spent on patient counseling and coordination of care.    My Ray MD  Gastroenterology Fellow  [facility]  Division of Gastroenterology and Hepatology    ...............................................................................................................................................  ** Please Note: This note is constructed using a voice recognition dictation system. **

## 2024-07-17 NOTE — OCCUPATIONAL THERAPY NOTE
07/17/24 1332   OT Last Visit   OT Visit Date 07/17/24   Note Type   Note Type Treatment   Pain Assessment   Pain Assessment Tool FLACC   Pain Score No Pain   Pain Rating: FLACC (Rest) - Face 0   Pain Rating: FLACC (Rest) - Legs 0   Pain Rating: FLACC (Rest) - Activity 0   Pain Rating: FLACC (Rest) - Cry 0   Pain Rating: FLACC (Rest) - Consolability 0   Score: FLACC (Rest) 0   Pain Rating: FLACC (Activity) - Face 0   Pain Rating: FLACC (Activity) - Legs 0   Pain Rating: FLACC (Activity) - Activity 0   Pain Rating: FLACC (Activity) - Cry 0   Pain Rating: FLACC (Activity) - Consolability 0   Score: FLACC (Activity) 0   Restrictions/Precautions   Weight Bearing Precautions Per Order No   Other Precautions Fall Risk;Hard of hearing;Cognitive;Chair Alarm;Impulsive;Telemetry;Aspiration   Transfers   Sit to Stand   (CGA)   Additional items Assist x 1;Impulsive   Stand to Sit 3  Moderate assistance   Additional items Assist x 2;Impulsive  (Sits prematurely requiring assist of therapist to boost into recliner.)   Stand pivot 4  Minimal assistance   Additional items Assist x 2;Increased time required;Verbal cues   Functional Mobility   Functional Mobility 4  Minimal assistance   Additional Comments Pt ambulates 140 feet with Min A x 2; noted multiple episodes of LOB or unsteadioness during mobility.   Additional items Rolling walker   Cognition   Overall Cognitive Status Impaired   Arousal/Participation Alert   Attention Difficulty attending to directions   Orientation Level Disoriented to person;Disoriented to place;Disoriented to time;Disoriented to situation   Memory Decreased recall of recent events;Decreased long term memory;Decreased recall of biographical information;Decreased short term memory;Decreased recall of precautions   Following Commands Unable to follow one step commands   Comments Pt agreeable to OT treatment; requires 24/7   Activity Tolerance   Activity Tolerance Patient limited by fatigue   Assessment    Assessment Patient participated in Skilled OT session this date with interventions consisting of functional transfer training and Energy Conservation techniques . Patient agreeable to OT treatment session, upon arrival patient was found seated OOB to Recliner, alert, responsive , and in no apparent distress.  Patient transfers sit to stand with CGA x 1 and is impulsive with transfers. Patient then completes functional mobility x 140 feet with Min a x 2. Patient noted to have multiple episodes of LOB during mobility, requiring assist of therapist to correct. Patient requires Mod A x 2 to sit as patient went to sit prematurely, barely on surface of recliner and required assist og therapist to reposition and boost in recliner. Session ended with patient refusal for further activity at this time. Patient seated OOB to recliner, all needs met, and call bell within reach. In comparison to previous session, patient with improvements in endurance and functional mobility. Patient requiring frequent re direction, verbal cues for safety, verbal cues for correct technique, verbal cues for pacing thru activity steps, and one step directives. Patient performance  demonstrated good carryover of learned techniques and strategies to facilitate safety during functional tasks. Patient continues to be functioning below baseline level, occupational performance remains limited secondary to factors listed above and increased risk for falls and injury.   From OT standpoint, recommendation at time of d/c would be Level II (Moderate Resource Intensity).  Patient to benefit from continued Occupational Therapy treatment while in the hospital to address deficits as defined above and maximize level of functional independence with ADLs and functional mobility in order to return to PLOF.   Plan   Treatment Interventions Functional transfer training;Energy conservation   Goal Expiration Date 07/25/24   OT Treatment Day 1   OT Frequency 3-5x/wk    Discharge Recommendation   Rehab Resource Intensity Level, OT II (Moderate Resource Intensity)   Additional Comments  The patient's raw score on the AM-PAC Daily Activity Inpatient Short Form is 12. A raw score of less than 19 suggests the patient may benefit from discharge to post-acute rehabilitation services. Please refer to the recommendation of the Occupational Therapist for safe discharge planning.   AM-PAC Daily Activity Inpatient   Lower Body Dressing 2   Bathing 2   Toileting 2   Upper Body Dressing 2   Grooming 2   Eating 2   Daily Activity Raw Score 12   Daily Activity Standardized Score (Calc for Raw Score >=11) 30.6   AM-PAC Applied Cognition Inpatient   Following a Speech/Presentation 1   Understanding Ordinary Conversation 1   Taking Medications 1   Remembering Where Things Are Placed or Put Away 1   Remembering List of 4-5 Errands 1   Taking Care of Complicated Tasks 1   Applied Cognition Raw Score 6   Applied Cognition Standardized Score 7.69       Debbie Sandoval

## 2024-07-17 NOTE — ASSESSMENT & PLAN NOTE
Without exacerbation  Patient examines euvolemic, continue close monitoring  Statin on hold for NPO status

## 2024-07-17 NOTE — ASSESSMENT & PLAN NOTE
Formal speech evaluation recommending puréed diet and honey thick liquid  VBS 7/16 - see aspiration for full plan

## 2024-07-17 NOTE — SPEECH THERAPY NOTE
Speech Language/Pathology  Per spouse requested ST call and speak to pts daughters Genesis and Milvia Chase. Reviewed pt's VBS completed day prior and current diet recommendations.Pt is well known to this ST as has been on caseload multiple times prior. Reviewed risk of aspiration at length. Discussed potential for PEG and pleasure feeds as well.Pt with his condition and swallow function complete avoidance of aspiration is impossible. Discussed with Daughters his quality of life has to be accounted for and eating is a major portion of his quality of life. Risk and benefits will have to be balanced.  Answered all questions as able. PA  made aware. Discussed with case management and nursing as well. Pt family meeting later today at 3pm.

## 2024-07-17 NOTE — PHYSICAL THERAPY NOTE
PHYSICAL THERAPY TREATMENT NOTE  NAME:  Thomas Chase  DATE: 07/17/24    Length Of Stay: 3  Performed at least 2 patient identifiers during session: Name and ID bracelet    TREATMENT FLOWSHEET:    07/17/24 1329   PT Last Visit   PT Visit Date 07/17/24   Note Type   Note Type Treatment   Pain Assessment   Pain Assessment Tool Maciel-Baker FACES   Pain Score No Pain   Maciel-Baker FACES Pain Rating 0   Restrictions/Precautions   Weight Bearing Precautions Per Order No   Other Precautions Fall Risk;Impulsive;Cognitive;Hard of hearing;Aspiration;Contact/isolation   General   Chart Reviewed Yes   Response to Previous Treatment Patient unable to report, no changes reported from family or staff   Family/Caregiver Present Yes   Cognition   Overall Cognitive Status Impaired   Arousal/Participation Alert   Attention Difficulty attending to directions   Orientation Level Disoriented to person;Disoriented to place;Disoriented to time;Disoriented to situation;Unable to assess   Memory Decreased recall of biographical information;Decreased long term memory;Decreased short term memory;Decreased recall of recent events;Decreased recall of precautions   Following Commands Unable to follow one step commands   Bed Mobility   Additional Comments Pt. found sitting OOB in recliner upon enetering room   Transfers   Sit to Stand   (CGA)   Additional items Assist x 2;Increased time required;Verbal cues;Impulsive  (RW)   Stand to Sit 4  Minimal assistance   Additional items Assist x 2;Increased time required;Verbal cues;Impulsive  (RW)   Stand pivot 4  Minimal assistance   Additional items Assist x 2;Increased time required;Verbal cues;Impulsive  (RW)   Additional Comments Poor safety awareness and no direction follow, sits before in front of chair   Ambulation/Elevation   Gait pattern Improper Weight shift;Decreased foot clearance;Narrow ILIANA;Short stride;Excessively slow;Decreased heel strike;Decreased toe off;Step through pattern   Gait  Assistance 4  Minimal assist   Additional items Assist x 2;Tactile cues   Assistive Device Rolling walker   Distance 140 ft   Ambulation/Elevation Additional Comments multiple lateral path lateral deviations causing LOB requiring min Ax2 to correct.  Pt. can not hear anything per wife   Balance   Static Sitting Fair +   Dynamic Sitting Fair   Static Standing Poor +   Dynamic Standing Poor   Ambulatory Poor -   Endurance Deficit   Endurance Deficit No   Activity Tolerance   Activity Tolerance Other (Comment)  (cognitive deficits and hearing impairment)   Medical Staff Made Aware ESTRADA aware and present for safety due to medical complexity   Nurse Made Aware yes   Assessment   Prognosis Fair   Problem List Decreased strength;Impaired balance;Decreased mobility;Decreased coordination;Decreased cognition;Impaired judgement;Decreased safety awareness;Impaired hearing   Goals   Patient Goals nothing stated   PT Treatment Day 1   Plan   Treatment/Interventions Functional transfer training;LE strengthening/ROM;Endurance training;Patient/family training;Equipment eval/education;Gait training;Compensatory technique education;Spoke to nursing;OT;Spoke to advanced practitioner   Progress Slow progress, cognitive deficits   PT Frequency 3-5x/wk   Discharge Recommendation   Rehab Resource Intensity Level, PT II (Moderate Resource Intensity)   Equipment Recommended Walker   Walker Package Recommended Wheeled walker   AM-PAC Basic Mobility Inpatient   Turning in Flat Bed Without Bedrails 3   Lying on Back to Sitting on Edge of Flat Bed Without Bedrails 3   Moving Bed to Chair 2   Standing Up From Chair Using Arms 2   Walk in Room 2   Climb 3-5 Stairs With Railing 1   Basic Mobility Inpatient Raw Score 13   Basic Mobility Standardized Score 33.99   Turning Head Towards Sound 4   Follow Simple Instructions 1   Low Function Basic Mobility Raw Score  18   Low Function Basic Mobility Standardized Score  29.25   [facility] Level  Of Mobility   -[facility] Goal 4: Move to chair/commode   -HLM Achieved 7: Walk 25 feet or more       The patient's AM-PAC Basic Mobility Inpatient Short Form Raw Score is 13. A raw score less than 16 suggests the patient may benefit from discharge to post-acute rehabilitation services. Please also refer to the recommendation of the Physical Therapist for safe discharge planning.    Pt seen for PT treatment session this date with interventions consisting of transfer training, gait training, and education provided as needed for safety and direction to improve functional mobility, safety awareness, and activity tolerance. Pt agreeable to PT treatment session upon arrival, pt found sitting OOB in recliner. At end of session, pt left sitting OOB in recliner with chair alarm activated, BLE elevated, and with all needs in reach. In comparison to previous session, pt with improvements in ambulation distance . Continue to recommend Level II (Moderate Resource Intensity at time of d/c in order to maximize pt's functional independence and safety w/ mobility. Pt continues to be functioning below baseline level. PT will continue to see pt while here in order to address the deficits listed above and provide interventions consistent w/ POC in effort to achieve STGs.    Arielle He, PTA

## 2024-07-18 ENCOUNTER — APPOINTMENT (INPATIENT)
Dept: RADIOLOGY | Facility: HOSPITAL | Age: 84
DRG: 177 | End: 2024-07-18
Payer: MEDICARE

## 2024-07-18 PROCEDURE — 99232 SBSQ HOSP IP/OBS MODERATE 35: CPT | Performed by: INTERNAL MEDICINE

## 2024-07-18 PROCEDURE — 92611 MOTION FLUOROSCOPY/SWALLOW: CPT

## 2024-07-18 PROCEDURE — 97530 THERAPEUTIC ACTIVITIES: CPT

## 2024-07-18 PROCEDURE — 74230 X-RAY XM SWLNG FUNCJ C+: CPT

## 2024-07-18 PROCEDURE — 99233 SBSQ HOSP IP/OBS HIGH 50: CPT | Performed by: HOSPITALIST

## 2024-07-18 RX ORDER — LORAZEPAM 2 MG/ML
0.5 INJECTION INTRAMUSCULAR EVERY 4 HOURS PRN
Status: DISCONTINUED | OUTPATIENT
Start: 2024-07-18 | End: 2024-07-25 | Stop reason: HOSPADM

## 2024-07-18 RX ADMIN — SODIUM CHLORIDE, SODIUM GLUCONATE, SODIUM ACETATE, POTASSIUM CHLORIDE, MAGNESIUM CHLORIDE, SODIUM PHOSPHATE, DIBASIC, AND POTASSIUM PHOSPHATE 75 ML/HR: .53; .5; .37; .037; .03; .012; .00082 INJECTION, SOLUTION INTRAVENOUS at 17:06

## 2024-07-18 RX ADMIN — CEFTRIAXONE 1000 MG: 1 INJECTION, SOLUTION INTRAVENOUS at 16:55

## 2024-07-18 RX ADMIN — SODIUM CHLORIDE, SODIUM GLUCONATE, SODIUM ACETATE, POTASSIUM CHLORIDE, MAGNESIUM CHLORIDE, SODIUM PHOSPHATE, DIBASIC, AND POTASSIUM PHOSPHATE 75 ML/HR: .53; .5; .37; .037; .03; .012; .00082 INJECTION, SOLUTION INTRAVENOUS at 01:34

## 2024-07-18 RX ADMIN — LORAZEPAM 0.5 MG: 2 INJECTION INTRAMUSCULAR; INTRAVENOUS at 19:08

## 2024-07-18 NOTE — PROCEDURES
Video Swallow Study      Patient Name: Thomas Chase  Today's Date: 7/18/2024        Past Medical History  Past Medical History:   Diagnosis Date    Abnormal echocardiogram 06/27/2022    Arthritis     EKG abnormalities 06/19/2022    Encounter for general adult medical examination without abnormal findings 03/20/2019    Fall 11/03/2022    Hearing loss 04/08/2009    Hyperlipidemia     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    Macular degeneration, wet (HCC)     Urinary retention 06/02/2022        Past Surgical History  Past Surgical History:   Procedure Laterality Date    BRAIN HEMATOMA EVACUATION Left 05/28/2022    Procedure: left CRANIOTOMY FOR SUBDURAL HEMATOMA;  Surgeon: Chris Watts MD;  Location: [facility];  Service: Neurosurgery    CARPAL TUNNEL RELEASE      HERNIA REPAIR Right     inguinal    IR CEREBRAL ANGIOGRAPHY / INTERVENTION  06/02/2022    KS COCHLEAR DEVICE IMPLANTATION W/WO MASTOIDECTOMY Left 1/17/2023    Procedure: LEFT COCHLEAR IMPLANTATION WITH MASTOIDECTOMY AND FACIAL NERVE MONITORING WITH AUDIOMETRIC TESTING OF THE IMPLANT;  Surgeon: Susie Tuttle MD;  Location:  MAIN OR;  Service: ENT    KS NEUROPLASTY &/TRANSPOS MEDIAN NRV CARPAL TUNNE Right 09/20/2021    Procedure: RELEASE CARPAL TUNNEL;  Surgeon: Bruno Segovia MD;  Location: [facility];  Service: Orthopedics       Summary:  Images are on PACS for review.     Oral Phase : Pt presented with mild/mod oral dysphagia. Bolus control and formation were reduced as premature spill to the pharynx. Transfer was reduced. Trace base of tongue residue.    Pharyngeal Phase : Pt presented with severe pharyngeal dysphagia. Swallow initiation was delayed resulting in premature spillage to the valleculae and pyriforms with thin, nectar, and honey thick liquids. Weak tongue base retraction. Hyoid elevation, laryngeal excursion, and pharyngeal constriction were reduced. Airway closure is reduced.   Epiglottic inversion was incomplete. Mod/max vallecular retention resulted in eventual spill over tip of epiglottis. Trace epiglottic undercoat resulted in residue with the anterior commissure.  Mod/Max pyriform retention with all trials. Reduced passage through the UES. Pt secondary swallow mostly absent. When pt did initiate secondary swallow it was mostly severely delayed and effortful. Mod Aspiration observed with thin, nectar and honey thick liquids. Retropulsion of pharyngeal residue resulted in aspiration. Pt mostly had no cough response to aspiration events. If cough present varied from weak to harsh successive. Overall pt cough was ineffective in clearing aspirated residue. As study progressed audibly wet vocal quality with pt cough and pt groan increased.     Per Esophageal screen   View limited as primarily focus on pharyngeal stage.  Appeared adequate.     Observations: VBS completed as daughter requested. Pt is [place] and hx of dementia. He is mostly unable to follow verbal, visual, and tactile cues for safe swallow strategies.    Recommendations:  Diet: NPO ice chips for pleasure 1-2 time at time with nursing supervision. PEG if desired consider. GOC.   Meds: nonoral, PEG if desired   Upright position  F/u ST tx: available for ongoing education  Therapy Prognosis: guarded  Prognosis considerations:age, medical status,   **Full Supervision w/ ice chips for pleasure. Oral care prior to trials. Discontinue If increased difficulties with oral control, no initiation of swallow , excessive coughing, increased secretions, or excessive gurgly voice with no success cued cough throat clear **  Aspiration Precautions  Reflux Precautions  Consider consult with: POC, GI, nutrition  Results reviewed with: pt, physician,   Aspiration precautions posted.  If a dedicated assessment of the esophagus is desired, consider esophagram/barium swallow or EGD.    Patient's goal:none stated    H&P/Admit info, pertinent provider  notes/procedures/studies/PMH:(copied and placed in this report)  Thomas Chase is a 83 y.o. male with a PMH of vascular dementia, severe protein calorie malnutrition, stents, insomnia, peripheral vascular disease, prior history of aspiration of right lower lobe pneumonia, prior subdural hematoma: February 2024 s/p craniotomy 2 years ago, dysphagia, hearing impairment who presents with family, with a chief complaint two days  of generalized increase weakness, increased confusion with anxiety and balance issues.  Unfortunately patient is unable to provide any information.   Patient's daughter and wife at the bedside states that the patient has been noticed more disoriented than at his baseline with unbalanced gait.  He appeared very weak to the point where 2 people could not hold him.  They noticed patient was coughing more than usual.  Family reports history of dysphagia. Patient also taking Bactrim for UTI.   Family reports having increasing challenges taking care of the patient at home who has 24-hour care concerned that they are unable to take care  and ask in  assistance in placement of the patient in a skilled nursing facility no recent falls.    Patient does not normally speak is unable to communicate effectively, communicates by writing on the board, is severely hearing impaired.  Chest x-ray showed right lower lobe pneumonia.  Labs unrevealing.  On my evaluation patient appears not in acute distress, hemodynamically stable.    Family denies any fevers, nausea, vomiting, diarrhea.    Special Studies   CT BRAIN - WITHOUT CONTRAST 07/13/2024  No acute intracranial abnormality. Chronic microangiopathic changes.   XR CHEST PORTABLE 07/13/2024  Mild right base opacity which could be due to pneumonia.     Code Status: Level 3 DNR/DNI    Previous VBS:  07/16/2024  Summary:  Images are on PACS for review.   Oral Phase :Pt presented with mild oral dysphagia. Bolus control, formation, and transfer were min reduced.  Reduced bolus control resulted in spill to pharynx.   Pharyngeal Phase : Pt presented with severe pharyngeal dysphagia. Swallow initiation is delayed resulting in premature spill to the valleculae. Hyoid elevation, laryngeal excursion, and pharyngeal constriction is reduced.  Epiglottic inversion is incomplete. Airway closure was reduced. Pharyngeal retention in valleculae, along AE folds, in pyriforms, and in UES. Phillip aspiration observed with thin liquids before during and after swallow. Mod aspiration with nectar and honey thick liquids. Secondary swallow to clear retention was mostly absent, if pt was able to initiate swallow was severely delayed. Retropulsion resulted in aspiration of pharyngeal residue. Pt inconsistent cough response to aspiration. When pt did cough it was harsh and extended however unsuccessful in clearing residue.   Per Esophageal screen   View is limited as primarily focus on pharyngeal stage appeared adequate for intake.  Observations: Pt is [place]. He does have hx of cognitive impairment. Pt does utilize white board for simple direction following however primarily unable to follow commands when provided verbal, visual, and model cue. ST did not administer additional solids or 13mm pill as safety concern   Recommendations:  Diet:NPO ice chips for pleasure 1-2  at a time with nursing supervision. Consider alternate means of nutrition as appropriate. Consider GOC.   Meds: non oral   Upright position  F/u ST tx:yes   Therapy Prognosis:guarded   Prognosis considerations:age, medical status   **Full Supervision w/ ice chips for pleasure. Oral care prior to trials. Discontinue If increased difficulties with oral control, no initiation of swallow , excessive coughing, increased secretions, or excessive gurgly voice with no success cued cough throat clear  Aspiration Precautions  Reflux Precautions  Consider consult with: Nutrition, GI   Results reviewed with: pt, nursing, family, PA-C  Aspiration  precautions posted.  If a dedicated assessment of the esophagus is desired, consider esophagram/barium swallow or EGD.    Precautions  Aspiration  Fall      Food allergies:  No known    Current diet:  NPO    Premorbid diet:  NPO   Dentition  Adequate   Oral Mech  WNL   O2 requirements:  Room air   Vocal Quality/Speech WNL   Cognitive status:  Basic      Consistencies administered: Barium laden applesauce, honey thick, nectar thick, thin liquids. Liquids were administered by tsp, cup and straw.     Pt was seated laterally at 90 degrees.   Pt  unable to viewed in AP position due transfer status

## 2024-07-18 NOTE — CASE MANAGEMENT
Case Management Discharge Planning Note    Patient name Thomas Chase  Location /-01 MRN [id number]  : 1940 Date 2024       Current Admission Date: 2024  Current Admission Diagnosis:Aspiration pneumonia (HCC)   Patient Active Problem List    Diagnosis Date Noted Date Diagnosed    Refusal of treatment 2024     H/O orthostatic hypotension 07/15/2024     Aspiration pneumonia (HCC) 2024     Acute metabolic encephalopathy 2024     UTI (urinary tract infection) 2024     Peripheral vascular disease, unspecified (HCC) 2024     Abnormal urinalysis 2024     Benign prostatic hyperplasia without lower urinary tract symptoms 2024     Insomnia 2024     Abnormal CT of the head 2024     Hematuria 2024     Headache 2024     Behavior safety risk 2024     Bilateral lower extremity weakness 2024     Abnormal CT scan, lumbar spine 02/15/2024     Severe protein-calorie malnutrition (HCC) 2024     Debility 2024     Pharyngeal myoclonus 2023     Dysphagia 2023     Macular degeneration, age related 2023     H/O traumatic subdural hematoma 2023     Sensorineural hearing loss (SNHL), bilateral 2022     Balance disorder 2022     Right bundle-branch block 2022     Low BP 2022     Diastolic dysfunction 2022     Constipation due to neurogenic bowel 2022     Urinary retention 2022     SDH (subdural hematoma) ([facility]) 2022     Primary osteoarthritis of left knee 2022     Hygroma 2022     Right hand pain      Carpal tunnel syndrome on right      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[facility]) 2021     Overweight (BMI 25.0-29.9) 2021     Negative depression screening 2019     Hypercholesterolemia 2018     Borderline hypertension 2018       LOS (days): 4  Geometric Mean LOS (GMLOS) (days): 5  Days to GMLOS:0.3     OBJECTIVE:  Risk of  Unplanned Readmission Score: 21.14         Current admission status: Inpatient   Preferred Pharmacy:   [id number]  [place] - [address]  [address] [address]  Phone: [phone] Fax: [phone]    Primary Care Provider: RONY Coe    Primary Insurance: MEDICARE  Secondary Insurance: [facility]    DISCHARGE DETAILS:    Discharge planning discussed with:: cm received a call from Allegra at 8:27 am today  Freedom of Choice: Yes  Comments - Freedom of Choice: referrals was sent to [facility], [facility] as requested via kylee -  cm placed a call to Adelita at 9:37 am and she stated she did receive the referral and it is being reviewed  CM contacted family/caregiver?: Yes             Contacts  Patient Contacts: Allegra Chase  Relationship to Patient:: Family (daughter)  Contact Method: Phone  Phone Number: [phone]  Reason/Outcome: Discharge Planning              Other Referral/Resources/Interventions Provided:  Referral Comments: alek was told  the family does not want a peg tube a this time and where hoping to get the pt to [facility] wiht rehab and possibel placement- it is hard for his wife to care for him    Would you like to participate in our [facility] service program?  : No - Declined    Treatment Team Recommendation:  (D/C plan  TBD - TBD)

## 2024-07-18 NOTE — ASSESSMENT & PLAN NOTE
POA  Likely secondary to infectious etiology and PMH dementia   Initial CT head-no acute pathology  Continue close monitoring  Patient pleasantly confused, cooperative at time of exam

## 2024-07-18 NOTE — ASSESSMENT & PLAN NOTE
"Presenting with increased confusion, anxiety and balance issues  Initial chest x-ray was concerning for right lower lobe pneumonia   Concern is for the possibility of an aspiration event, or ongoing chronic microaspiration  Last fever documented 7/14 of 101.3 °F  Testing for COVID-19, influenza, and respiratory syncytial virus is negative  Urinary antigens negative   Blood cultures x2 no growth after 72 hours  Patient was initially put on ceftriaxone, and Flagyl - switched to Augmentin 7/15 D2, with plan to continue 7 day course  Augmentin since discontinued back on Rocephin due to n.p.o.  Appreciate input of speech therapy who are following  Video barium swallow 7/16: patient aspirated before, during and after swallow  Patient was made n.p.o.  Continued goals of care discussion-spoke with patient's daughter Allegra at length.  She states that they are not ready for hospice.  Declining feeding tube at this time.  They feel that food is the 1 thing that the patient enjoys and he feels that we are \"starving him\" or not feeding him.  He has been requesting food from the family.  They declined feeding tube feel he is high risk for pulling it out and could cause \"internal bleeding\" if he pulled tube out.  Daughter requests speech therapy reevaluate patient as he has waxing and waning mentation.  Discussed with speech therapy will do repeat video swallow today.  Daughter understands that patient is high risk for aspiration and understands the complications of same.    Appreciate input of GI who continue to follow  At at length discussion with patient's daughter via the phone.  Family is declining PEG tube at this time.  At the request of the daughter we are having speech therapy reevaluate the patient today with video swallow  PT, OT recommending level II on discharge-case management following working on placement for discharge    "

## 2024-07-18 NOTE — PLAN OF CARE
Problem: PHYSICAL THERAPY ADULT  Goal: Performs mobility at highest level of function for planned discharge setting.  See evaluation for individualized goals.  Description: Treatment/Interventions: Functional transfer training, Therapeutic exercise, Endurance training, Gait training, Bed mobility, Equipment eval/education  Equipment Recommended: Walker       See flowsheet documentation for full assessment, interventions and recommendations.  7/18/2024 1116 by Arielle He, PTA  Outcome: Progressing  Note: Prognosis: Guarded  Problem List: Decreased strength, Impaired balance, Decreased mobility, Decreased coordination, Decreased cognition, Impaired judgement, Decreased safety awareness, Impaired hearing  Assessment: Pt seen for PT treatment session this date with interventions consisting of transfer training, bed mobility tasks, and education provided through visual cues as needed for safety and direction to improve functional mobility, safety awareness, and activity tolerance. Pt agreeable to PT treatment session upon arrival, pt found attempting to climb out of recliner. At end of session, pt left in bed with bed alarm activated and with all needs in reach. In comparison to previous session, pt with improvements in amount of assistance required for transfers . Continue to recommend Level II (Moderate Resource Intensity at time of d/c in order to maximize pt's functional independence and safety w/ mobility. Pt continues to be functioning below baseline level. PT will continue to see pt while here in order to address the deficits listed above and provide interventions consistent w/ POC in effort to achieve STGs        Rehab Resource Intensity Level, PT: II (Moderate Resource Intensity)    See flowsheet documentation for full assessment.     7/18/2024 1116 by Arielle He, PTA  Outcome: Not Progressing  Note: Prognosis: Guarded  Problem List: Decreased strength, Impaired balance, Decreased mobility,  Decreased coordination, Decreased cognition, Impaired judgement, Decreased safety awareness, Impaired hearing  Assessment: Pt seen for PT treatment session this date with interventions consisting of transfer training, bed mobility tasks, and education provided through visual cues as needed for safety and direction to improve functional mobility, safety awareness, and activity tolerance. Pt agreeable to PT treatment session upon arrival, pt found attempting to climb out of recliner. At end of session, pt left in bed with bed alarm activated and with all needs in reach. In comparison to previous session, pt with improvements in amount of assistance required for transfers . Continue to recommend Level II (Moderate Resource Intensity at time of d/c in order to maximize pt's functional independence and safety w/ mobility. Pt continues to be functioning below baseline level. PT will continue to see pt while here in order to address the deficits listed above and provide interventions consistent w/ POC in effort to achieve STGs        Rehab Resource Intensity Level, PT: II (Moderate Resource Intensity)    See flowsheet documentation for full assessment.

## 2024-07-18 NOTE — NURSING NOTE
AM care provided ax2 staff PCA. Mouth care provided by this RN. Ax2 RW to recliner. Chair alarm on at this time. Attempting to wean pt off 1:1, sitting in doorway.

## 2024-07-18 NOTE — PROGRESS NOTES
[facility] - Inpatient Progress Note    PATIENT INFORMATION      Thomas Chase 83 y.o. male MRN: [id number]  Unit/Bed#: -01 Encounter: [id number]    ASSESSMENT & PLAN   Thomas Chase is a 83 y.o. old male with PMHx of Ischemic vascular dementia, PVD, malnutrition, subdural hematoma s/p craniotomy February 2024 who presented with inability to tolerate oral intake.  GI was consulted for evaluation of PEG tube placement.     Oropharyngeal dysphagia  Aspiration pneumonia  Patient appeared more awake this morning. Family has decided not to pursue PEG tube in patient.   Family does not want to pursue PEG tube at this time.   Feel free to reach out to GI with any questions.     SUBJECTIVE     Patient seen and evaluated at bedside. Patient awake at bedside, shakes head no when asking if he's having pain.     MEDICATIONS & ALLERGIES       Medications:     Medications Prior to Admission:     acetaminophen (TYLENOL) 325 mg tablet    cyanocobalamin (VITAMIN B-12) 500 MCG tablet    loratadine (CLARITIN) 10 mg tablet    LORazepam (Ativan) 0.5 mg tablet    melatonin 3 mg    midodrine (PROAMATINE) 5 mg tablet    Multiple Vitamin (multivitamin) tablet    nystatin (MYCOSTATIN) 500,000 units/5 mL suspension    polyethylene glycol (MIRALAX) 17 g packet    QUEtiapine (SEROquel) 25 mg tablet    senna (SENOKOT) 8.6 mg    sertraline (ZOLOFT) 25 mg tablet    simvastatin (ZOCOR) 20 mg tablet    tamsulosin (FLOMAX) 0.4 mg  Current Facility-Administered Medications   Medication Dose Route Frequency    acetaminophen (TYLENOL) rectal suppository 325 mg  325 mg Rectal Q4H PRN    bisacodyl (DULCOLAX) rectal suppository 10 mg  10 mg Rectal Daily PRN    cefTRIAXone (ROCEPHIN) IVPB (premix in dextrose) 1,000 mg 50 mL  1,000 mg Intravenous Q24H    LORazepam (ATIVAN) injection 0.5 mg  0.5 mg Intravenous Q6H PRN    multi-electrolyte (PLASMALYTE-A/ISOLYTE-S PH 7.4) IV solution  75 mL/hr Intravenous Continuous  "      Allergies:   No Known Allergies    PHYSICAL EXAM     Objective   Blood pressure 135/79, pulse 73, temperature 98 °F (36.7 °C), resp. rate 16, height 5' 10\" (1.778 m), weight 61.6 kg (135 lb 12.9 oz), SpO2 97%. Body mass index is 19.49 kg/m².    Intake/Output Summary (Last 24 hours) at 7/18/2024 0737  Last data filed at 7/18/2024 0600  Gross per 24 hour   Intake 1316.25 ml   Output 300 ml   Net 1016.25 ml       General Appearance:   Alert   HEENT:   Normocephalic, atraumatic, anicteric     Neck:   Supple, symmetrical, trachea midline   Lungs:   Equal chest rise, respirations unlabored    Heart:   Regular rate and rhythm   Abdomen:   Soft, non-tender, non-distended; normal bowel sounds; no masses, no organomegaly    Rectal:   Deferred    Extremities:   No cyanosis, clubbing or edema    Neuro:   Moves all 4 extremities    Skin:   No jaundice, rashes, or lesions      ADDITIONAL DATA     Lab Results:     Results from last 7 days   Lab Units 07/17/24  0549 07/16/24  0524   WBC Thousand/uL 7.35 6.44   HEMOGLOBIN g/dL 12.8 12.0   HEMATOCRIT % 39.6 37.2   PLATELETS Thousands/uL 489* 424*   SEGS PCT %  --  74   LYMPHO PCT %  --  16   MONO PCT %  --  8   EOS PCT %  --  1     Results from last 7 days   Lab Units 07/17/24  0549 07/14/24  0455 07/13/24 2120   POTASSIUM mmol/L 4.2   < > 4.7   CHLORIDE mmol/L 103   < > 104   CO2 mmol/L 28   < > 27   BUN mg/dL 17   < > 21   CREATININE mg/dL 0.62   < > 0.82   CALCIUM mg/dL 9.1   < > 9.0   ALK PHOS U/L  --   --  93   ALT U/L  --   --  20   AST U/L  --   --  19    < > = values in this interval not displayed.     Results from last 7 days   Lab Units 07/13/24  2120   INR  1.04       Imaging:    FL barium swallow video w speech    Result Date: 7/16/2024  Narrative: A video barium swallow study was performed by the Department of Speech Pathology. Please refer to the report for the official interpretation. The images are stored for archival purposes only. Study images were not " formally reviewed by the Radiology Department.    FL barium swallow video w speech    Result Date: 7/16/2024  Narrative: Janet Brown SLP     7/16/2024  5:52 PM                                  Video Swallow Study Patient Name: Thomas Chase Today's Date: 7/16/2024   Past Medical History Past Medical History: Diagnosis Date  Abnormal echocardiogram 06/27/2022  Arthritis   EKG abnormalities 06/19/2022  Encounter for general adult medical examination without abnormal findings 03/20/2019  Fall 11/03/2022  Hearing loss 04/08/2009  Hyperlipidemia   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  Macular degeneration, wet (HCC)   Urinary retention 06/02/2022  Past Surgical History Past Surgical History: Procedure Laterality Date  BRAIN HEMATOMA EVACUATION Left 05/28/2022  Procedure: left CRANIOTOMY FOR SUBDURAL HEMATOMA;  Surgeon: Chris Watts MD;  Location:  MAIN OR;  Service: Neurosurgery  CARPAL TUNNEL RELEASE    HERNIA REPAIR Right   inguinal  IR CEREBRAL ANGIOGRAPHY / INTERVENTION  06/02/2022  OH COCHLEAR DEVICE IMPLANTATION W/WO MASTOIDECTOMY Left 1/17/2023  Procedure: LEFT COCHLEAR IMPLANTATION WITH MASTOIDECTOMY AND FACIAL NERVE MONITORING WITH AUDIOMETRIC TESTING OF THE IMPLANT;  Surgeon: Susie Tuttle MD;  Location:  MAIN OR;  Service: ENT  OH NEUROPLASTY &/TRANSPOS MEDIAN NRV CARPAL TUNNE Right 09/20/2021  Procedure: RELEASE CARPAL TUNNEL;  Surgeon: Bruno Segovia MD;  Location: [facility] OR;  Service: Orthopedics Summary: Images are on PACS for review. Oral Phase :Pt presented with mild oral dysphagia. Bolus control, formation, and transfer were min reduced. Reduced bolus control resulted in spill to pharynx. Pharyngeal Phase : Pt presented with severe pharyngeal dysphagia. Swallow initiation is delayed resulting in premature spill to the valleculae. Hyoid elevation, laryngeal excursion, and pharyngeal constriction is reduced.  Epiglottic inversion is incomplete. Airway closure was reduced.  Pharyngeal retention in valleculae, along AE folds, in pyriforms, and in UES. Phillip aspiration observed with thin liquids before during and after swallow. Mod aspiration with nectar and honey thick liquids. Secondary swallow to clear retention was mostly absent, if pt was able to initiate swallow was severely delayed. Retropulsion resulted in aspiration of pharyngeal residue. Pt inconsistent cough response to aspiration. When pt did cough it was harsh and extended however unsuccessful in clearing residue. Per Esophageal screen View is limited as primarily focus on pharyngeal stage appeared adequate for intake. Observations: Pt is [place]. He does have hx of cognitive impairment. Pt does utilize white board for simple direction following however primarily unable to follow commands when provided verbal, visual, and model cue. ST did not administer additional solids or 13mm pill as safety concern Recommendations: Diet:NPO ice chips for pleasure 1-2  at a time with nursing supervision. Consider alternate means of nutrition as appropriate. Consider GOC. Meds: non oral Upright position F/u ST tx:yes Therapy Prognosis:guarded Prognosis considerations:age, medical status **Full Supervision w/ ice chips for pleasure. Oral care prior to trials. Discontinue If increased difficulties with oral control, no initiation of swallow , excessive coughing, increased secretions, or excessive gurgly voice with no success cued cough throat clear Aspiration Precautions Reflux Precautions Consider consult with: Nutrition, GI Results reviewed with: pt, nursing, family, PA-C Aspiration precautions posted. If a dedicated assessment of the esophagus is desired, consider esophagram/barium swallow or EGD. Goals: Dysphagia LTG -Patient will demonstrate safe and effective oral intake (without overt s/s significant oral/pharyngeal dysphagia including s/s penetration or aspiration) for the highest appropriate diet level. -Patient’s caregiver will  demonstrate understanding and adherence to recommended diet and use of (or prompting for use of) compensatory strategies. Patient's goal:none stated H&P/Admit info, pertinent provider notes/procedures/studies/PMH:(copied and placed in this report) Thomas Chase is a 83 y.o. male with a PMH of vascular dementia, severe protein calorie malnutrition, stents, insomnia, peripheral vascular disease, prior history of aspiration of right lower lobe pneumonia, prior subdural hematoma: February 2024 s/p craniotomy 2 years ago, dysphagia, hearing impairment who presents with family, with a chief complaint two days  of generalized increase weakness, increased confusion with anxiety and balance issues.  Unfortunately patient is unable to provide any information. Patient's daughter and wife at the bedside states that the patient has been noticed more disoriented than at his baseline with unbalanced gait.  He appeared very weak to the point where 2 people could not hold him.  They noticed patient was coughing more than usual.  Family reports history of dysphagia. Patient also taking Bactrim for UTI. Family reports having increasing challenges taking care of the patient at home who has 24-hour care concerned that they are unable to take care  and ask in  assistance in placement of the patient in a skilled nursing facility no recent falls.  Patient does not normally speak is unable to communicate effectively, communicates by writing on the board, is severely hearing impaired. Chest x-ray showed right lower lobe pneumonia.  Labs unrevealing. On my evaluation patient appears not in acute distress, hemodynamically stable.  Family denies any fevers, nausea, vomiting, diarrhea. Special Studies CT BRAIN - WITHOUT CONTRAST 07/13/2024 No acute intracranial abnormality. Chronic microangiopathic changes. XR CHEST PORTABLE 07/13/2024 Mild right base opacity which could be due to pneumonia. Code Status:  Level 1 Full code Previous VBS: Video  Swallow Study Summary: Images are on PACS for review. Oral Phase : Pt presented with mild oral dysphagia. Bolus control, formation, and transfer were mildly reduced. Mild premature spill occurred. Trace posterior base of tongue residue. Pharyngeal Phase : Pt presented with mod/severe pharyngeal dysphagia. Swallow initiation was delayed. Premature spillage occurred to the valleculae with all trials. With trials of thin liquids premature spill occurred to the pyriforms. Base of tongue retraction was reduced. Hyoid laryngeal elevation was variable WFL vs mild/mod reduced. Pharyngeal constriction and air way closure were reduced. Epiglottic inversion was incomplete. Mod to Max vallecular retention occurred with all consistencies. Pyriform retention occurred with all consistencies. Phillip aspiration occurred during and after the swallow with thin liquids via cup/straw. Pt had no sensation of aspiration events. Increased difficulty following verbal and written commands due to cognitive status and [place].  Cued cough appeared weak and ineffective in clearing aspirated residue. Pt will independently initiate multiple swallows (3-4) to clear residue however min successful in clearing residue. Per Esophageal screen All material cleared adequately  Observations: Pt [place] and hx of cognitive deficits. ST provided verbal and written directions however increased difficulty following. ST did administer solids or 13mm pill as safety concern. Recommendations: Diet: Dysphagia 1 pureed Liquids: Nectar Strategies: slow rate, small sips, alternate liquids with solids, double swallow and effortful swallow. Upright position F/u ST tx: yes Full/Supervision Aspiration Precautions Reflux Precautions Consider consult with: Nutrition Results reviewed with: pt, OP SLP Aspiration precautions posted. If a dedicated assessment of the esophagus is desired, consider esophagram/barium swallow or EGD. Precautions : Fall Food allergies:  No known Current  diet:  Dysphagia 1 pureed and htl Premorbid diet:  Dysphagia 1 pureed and ntl Dentition  Adequate  Oral Mech  WNL O2 requirements:  Room air  Vocal Quality/Speech WNL Cognitive status:  Alert  Consistencies administered: Barium laden thin liquids, nectar, and honey thick liquids. Liquids were administered by cup and straw. Did not administer additional solids as safety concern. Pt was seated laterally at 90 degrees. Pt  unable to be viewed in AP position due to transfer status.      CT head without contrast    Result Date: 7/14/2024  Narrative: CT BRAIN - WITHOUT CONTRAST INDICATION:   change in mental status. COMPARISON: 3/16/2024. TECHNIQUE:  CT examination of the brain was performed.  Multiplanar 2D reformatted images were created from the source data. Radiation dose length product (DLP) for this visit:  978.3 mGy-cm .  This examination, like all CT scans performed in the [facility], was performed utilizing techniques to minimize radiation dose exposure, including the use of iterative reconstruction and automated exposure control. IMAGE QUALITY:  Diagnostic. FINDINGS: PARENCHYMA: Decreased attenuation is noted in periventricular and subcortical white matter demonstrating an appearance that is statistically most likely to represent mild microangiopathic change. No CT signs of acute infarction.  No intracranial mass, mass effect or midline shift.  No acute parenchymal hemorrhage. VENTRICLES AND EXTRA-AXIAL SPACES:  Ventricles and extra-axial CSF spaces are prominent commensurate with the degree of volume loss.  No hydrocephalus.  No acute extra-axial hemorrhage. VISUALIZED ORBITS: Normal visualized orbits. PARANASAL SINUSES: Normal visualized paranasal sinuses. CALVARIUM AND EXTRACRANIAL SOFT TISSUES: Left mastoidectomy with cochlear implant..     Impression: No acute intracranial abnormality.  Chronic microangiopathic changes. Workstation performed: [id number]     XR chest 1 view  portable    Result Date: 7/13/2024  Narrative: XR CHEST PORTABLE INDICATION: change in mental status. COMPARISON: CXR 4/18/2024 and 4/6/2024 and CTA neck 2/17/2024. FINDINGS: Mild right base opacity. No pneumothorax or pleural effusion. Normal cardiomediastinal silhouette. Bones are unremarkable for age. Normal upper abdomen.     Impression: Mild right base opacity which could be due to pneumonia. Workstation performed: [id number]       EKG, Pathology, and Other Studies Reviewed on Admission:   EKG: Reviewed      Counseling / Coordination of Care Time: 30 total mins spent n consult. Greater than 50% of total time spent on patient counseling and coordination of care.    My Ray MD  Gastroenterology Fellow  [facility]  Division of Gastroenterology and Hepatology    ...............................................................................................................................................  ** Please Note: This note is constructed using a voice recognition dictation system. **

## 2024-07-18 NOTE — PLAN OF CARE
Problem: Potential for Falls  Goal: Patient will remain free of falls  Description: INTERVENTIONS:  - Educate patient/family on patient safety including physical limitations  - Instruct patient to call for assistance with activity   - Consult OT/PT to assist with strengthening/mobility   - Keep Call bell within reach  - Keep bed low and locked with side rails adjusted as appropriate  - Keep care items and personal belongings within reach  - Initiate and maintain comfort rounds  - Make Fall Risk Sign visible to staff  - Offer Toileting every 2 Hours, in advance of need  - Initiate/Maintain  bed and chair alarm  - Obtain necessary fall risk management equipment: non slip socks  - Apply yellow socks and bracelet for high fall risk patients  - Consider moving patient to room near nurses station  Outcome: Progressing   In person 1:1 maintained.

## 2024-07-18 NOTE — ASSESSMENT & PLAN NOTE
PT/OT recommending level II  Case management following, planning discharge to rehab facility making referrals

## 2024-07-18 NOTE — ASSESSMENT & PLAN NOTE
Formal speech evaluation recommending puréed diet and honey thick liquid  VBS 7/16 - see aspiration for full plan   Patient is being reevaluated for a video swallow today at request of the family

## 2024-07-18 NOTE — ASSESSMENT & PLAN NOTE
Patient was recently diagnosed with a Proteus mirabilis UTI on 7/5/2024  Patient was taking Bactrim at home  Started on ceftriaxone, and transition to Flagyl to cover for aspiration and UTI.  After video barium swallow patient was made NPO.  Augmentin discontinued and ceftriaxone restarted - D6 abx   Remains afebrile  CBC, procalcitonin a.m.

## 2024-07-18 NOTE — NURSING NOTE
Very impulsive pt has been oob and climbing from the bottom of the bed and setting alarms off now 6-7 times in 30 minutes. He is not directable. Multiple attempts to see if urination was the issue and failed. Pt is deaf and not following kerry directable ques. Nursing supervisor is aware. Bed alarm is on

## 2024-07-18 NOTE — PHYSICAL THERAPY NOTE
PHYSICAL THERAPY TREATMENT NOTE  NAME:  Thomas Chase  DATE: 07/18/24    Length Of Stay: 4  Performed at least 2 patient identifiers during session: Name and ID bracelet    TREATMENT FLOWSHEET:    07/18/24 0915   PT Last Visit   PT Visit Date 07/18/24   Note Type   Note Type Treatment   Pain Assessment   Pain Assessment Tool Maciel-Baker FACES   Pain Score No Pain   Restrictions/Precautions   Weight Bearing Precautions Per Order No   Other Precautions Fall Risk;Hard of hearing;Impulsive;1:1;Aspiration;Telemetry;Multiple lines   General   Chart Reviewed Yes   Response to Previous Treatment Patient unable to report, no changes reported from family or staff   Family/Caregiver Present No   Cognition   Overall Cognitive Status Impaired   Arousal/Participation Alert   Attention Difficulty attending to directions   Orientation Level Disoriented X4   Memory Decreased recall of recent events;Decreased long term memory;Decreased recall of biographical information;Decreased short term memory;Decreased recall of precautions   Following Commands Unable to follow one step commands   Comments Pt. found attempting to climb out of recliner and pointing at bed.   Subjective   Subjective nothing verbalized   Bed Mobility   Sit to Supine 5  Supervision   Additional items Assist x 1;Increased time required  (visual cues provided)   Transfers   Sit to Stand   (CGA)   Additional items Assist x 1;Increased time required;Impulsive  (RW and visual cues provided)   Stand to Sit   (CGA)   Additional items Assist x 1;Increased time required;Impulsive  (visual cues provided)   Stand pivot 4  Minimal assistance   Additional items Assist x 1;Increased time required;Impulsive  (second staff present for safety, to manage IV pole, and wires)   Additional Comments Pt. able to transfer with Ax1 but due to poor safety awareness, unable to hear, requires visual cues, poor direction follow,multiple lined on IV pole, and pt.very impulsive are reasons it  is impairative for a second person should be present to assist for safety during all mobiltiy tasks   Ambulation/Elevation   Gait pattern Not tested   Ambulation/Elevation Additional Comments unable to assess due to patient determined to get into bed   Balance   Static Sitting Fair +   Dynamic Sitting Fair   Static Standing Poor +   Dynamic Standing Poor   Endurance Deficit   Endurance Deficit No   Activity Tolerance   Activity Tolerance Other (Comment)  (cognitive impairments)   Medical Staff Made Aware yes PCA provided 1:1  assisted   Nurse Made Aware yes   Assessment   Prognosis Guarded   Problem List Decreased strength;Impaired balance;Decreased mobility;Decreased coordination;Decreased cognition;Impaired judgement;Decreased safety awareness;Impaired hearing   Goals   Patient Goals to get into bed   PT Treatment Day 2   Plan   Treatment/Interventions Functional transfer training;LE strengthening/ROM;Patient/family training;Bed mobility;Compensatory technique education;Spoke to nursing   Progress Slow progress, cognitive deficits   PT Frequency 3-5x/wk   Discharge Recommendation   Rehab Resource Intensity Level, PT II (Moderate Resource Intensity)   Equipment Recommended Walker   Walker Package Recommended Wheeled walker   AM-PAC Basic Mobility Inpatient   Turning in Flat Bed Without Bedrails 3   Lying on Back to Sitting on Edge of Flat Bed Without Bedrails 3   Moving Bed to Chair 3   Standing Up From Chair Using Arms 3   Walk in Room 2   Climb 3-5 Stairs With Railing 1   Basic Mobility Inpatient Raw Score 15   Basic Mobility Standardized Score 36.97   [facility] Highest Level Of Mobility   -[facility] Goal 4: Move to chair/commode   -[facility] Achieved 4: Move to chair/commode         The patient's AM-PAC Basic Mobility Inpatient Short Form Raw Score is 15. A raw score less than 16 suggests the patient may benefit from discharge to post-acute rehabilitation services. Please also refer to the recommendation of the  Physical Therapist for safe discharge planning.    Pt seen for PT treatment session this date with interventions consisting of transfer training, bed mobility tasks, and education provided through visual cues as needed for safety and direction to improve functional mobility, safety awareness, and activity tolerance. Pt agreeable to PT treatment session upon arrival, pt found attempting to climb out of recliner. At end of session, pt left in bed with bed alarm activated and with all needs in reach. In comparison to previous session, pt with improvements in amount of assistance required for transfers . Continue to recommend Level II (Moderate Resource Intensity at time of d/c in order to maximize pt's functional independence and safety w/ mobility. Pt continues to be functioning below baseline level. PT will continue to see pt while here in order to address the deficits listed above and provide interventions consistent w/ POC in effort to achieve STGs.    Arielle He, PTA

## 2024-07-18 NOTE — PROGRESS NOTES
"[facility]  Progress Note  Name: Thomas Chase I  MRN: [id number]  Unit/Bed#: -01 I Date of Admission: 7/13/2024   Date of Service: 7/18/2024 I Hospital Day: 4    Assessment & Plan   * Aspiration pneumonia (HCC)  Assessment & Plan  Presenting with increased confusion, anxiety and balance issues  Initial chest x-ray was concerning for right lower lobe pneumonia   Concern is for the possibility of an aspiration event, or ongoing chronic microaspiration  Last fever documented 7/14 of 101.3 °F  Testing for COVID-19, influenza, and respiratory syncytial virus is negative  Urinary antigens negative   Blood cultures x2 no growth after 72 hours  Patient was initially put on ceftriaxone, and Flagyl - switched to Augmentin 7/15 D2, with plan to continue 7 day course  Augmentin since discontinued back on Rocephin due to n.p.o.  Appreciate input of speech therapy who are following  Video barium swallow 7/16: patient aspirated before, during and after swallow  Patient was made n.p.o.  Continued goals of care discussion-spoke with patient's daughter Allegra at length.  She states that they are not ready for hospice.  Declining feeding tube at this time.  They feel that food is the 1 thing that the patient enjoys and he feels that we are \"starving him\" or not feeding him.  He has been requesting food from the family.  They declined feeding tube feel he is high risk for pulling it out and could cause \"internal bleeding\" if he pulled tube out.  Daughter requests speech therapy reevaluate patient as he has waxing and waning mentation.  Discussed with speech therapy will do repeat video swallow today.  Daughter understands that patient is high risk for aspiration and understands the complications of same.    Appreciate input of GI who continue to follow  At at length discussion with patient's daughter via the phone.  Family is declining PEG tube at this time.  At the request of the daughter we are " having speech therapy reevaluate the patient today with video swallow  PT, OT recommending level II on discharge-case management following working on placement for discharge      UTI (urinary tract infection)  Assessment & Plan  Patient was recently diagnosed with a Proteus mirabilis UTI on 7/5/2024  Patient was taking Bactrim at home  Started on ceftriaxone, and transition to Flagyl to cover for aspiration and UTI.  After video barium swallow patient was made NPO.  Augmentin discontinued and ceftriaxone restarted - D6 abx   Remains afebrile  CBC, procalcitonin a.m.    Acute metabolic encephalopathy  Assessment & Plan  POA  Likely secondary to infectious etiology and PMH dementia   Initial CT head-no acute pathology  Continue close monitoring  Patient pleasantly confused, cooperative at time of exam    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HCC)  Assessment & Plan  CT head with no acute changes consistent with this, chronic vascular changes  Home sertraline on hold for NPO status     Dysphagia  Assessment & Plan  Formal speech evaluation recommending puréed diet and honey thick liquid  VBS 7/16 - see aspiration for full plan   Patient is being reevaluated for a video swallow today at request of the family    H/O traumatic subdural hematoma  Assessment & Plan  No acute changes on imaging  Continue monitoring    Diastolic dysfunction  Assessment & Plan  Without exacerbation  Patient examines euvolemic, continue close monitoring  Statin on hold for NPO status     H/O orthostatic hypotension  Assessment & Plan  Midodrine on hold for NPO status     Bilateral lower extremity weakness  Assessment & Plan  PT/OT recommending level II  Case management following, planning discharge to rehab facility making referrals    Sensorineural hearing loss (SNHL), bilateral  Assessment & Plan  Patient is very hard of hearing      Benign prostatic hyperplasia without lower urinary tract symptoms  Assessment & Plan  Tamsulosin on hold for NPO status    Continue to monitor urinary output               VTE Pharmacologic Prophylaxis: VTE Score: 5 High Risk (Score >/= 5) - Pharmacological DVT Prophylaxis Ordered: enoxaparin (Lovenox). Sequential Compression Devices Ordered.    Mobility:   Basic Mobility Inpatient Raw Score: 15  JH-HLM Goal: 4: Move to chair/commode  JH-HLM Achieved: 4: Move to chair/commode  JH-HLM Goal achieved. Continue to encourage appropriate mobility.    Patient Centered Rounds: I performed bedside rounds with nursing staff today.   Discussions with Specialists or Other Care Team Provider: Speech therapy, GI, nursing, case management    Education and Discussions with Family / Patient: Updated  (daughter) via phone.    Total Time Spent on Date of Encounter in care of patient: 45 mins. This time was spent on one or more of the following: performing physical exam; counseling and coordination of care; obtaining or reviewing history; documenting in the medical record; reviewing/ordering tests, medications or procedures; communicating with other healthcare professionals and discussing with patient's family/caregivers.    Current Length of Stay: 4 day(s)  Current Patient Status: Inpatient   Certification Statement: The patient will continue to require additional inpatient hospital stay due to doing to treat for aspiration pneumonia, repeat video swallow today, continued goals of care discussion    Discharge Plan:  Patient is being treated for aspiration pneumonia.  Continues on IV antibiotics and n.p.o.  Had at length discussion with patient's daughter Allegra via the phone today.  Family does not want PEG tube, states that he would likely be high risk to pull it out and can cause some internal bleeding.  Also added that patient does not understand aspiration and why he cannot eat and has been frequently requesting food.  Food is 1 thing that he can still control.  Also discussed aspiration at length.  Daughter understands that patient  has been an aspiration risk for some time now.  She states that they understand the risk of aspiration accept the risks.  They would like him reevaluated by speech therapy.  Speech therapist will do repeat video swallow today.  Case management is following and planning rehab placement for discharge, making referrals.    Code Status: Level 3 - DNAR and DNI    Subjective:   Offers no complaints of discomfort, pleasantly confused.    Objective:     Vitals:   Temp (24hrs), Av.9 °F (36.6 °C), Min:97.5 °F (36.4 °C), Max:98 °F (36.7 °C)    Temp:  [97.5 °F (36.4 °C)-98 °F (36.7 °C)] 98 °F (36.7 °C)  HR:  [68-75] 73  Resp:  [14-17] 16  BP: (100-136)/(59-79) 135/79  SpO2:  [95 %-98 %] 97 %  Body mass index is 19.49 kg/m².     Input and Output Summary (last 24 hours):     Intake/Output Summary (Last 24 hours) at 2024 1356  Last data filed at 2024 1214  Gross per 24 hour   Intake 1316.25 ml   Output 200 ml   Net 1116.25 ml       Physical Exam:   Physical Exam  Vitals and nursing note reviewed.   Constitutional:       General: He is not in acute distress.     Appearance: He is well-developed and normal weight.   HENT:      Mouth/Throat:      Mouth: Mucous membranes are dry.   Cardiovascular:      Rate and Rhythm: Normal rate and regular rhythm.      Pulses: Normal pulses.      Heart sounds: Normal heart sounds. No murmur heard.  Pulmonary:      Effort: Pulmonary effort is normal. No respiratory distress.      Breath sounds: Normal breath sounds. No wheezing or rales.   Abdominal:      General: Bowel sounds are normal. There is no distension.      Palpations: Abdomen is soft.      Tenderness: There is no abdominal tenderness. There is no guarding.   Musculoskeletal:         General: No swelling. Normal range of motion.   Skin:     General: Skin is warm and dry.      Capillary Refill: Capillary refill takes less than 2 seconds.   Neurological:      General: No focal deficit present.      Mental Status: He is alert.  Mental status is at baseline.   Psychiatric:         Mood and Affect: Mood normal.          Additional Data:     Labs:  Results from last 7 days   Lab Units 07/17/24  0549 07/16/24  0524   WBC Thousand/uL 7.35 6.44   HEMOGLOBIN g/dL 12.8 12.0   HEMATOCRIT % 39.6 37.2   PLATELETS Thousands/uL 489* 424*   SEGS PCT %  --  74   LYMPHO PCT %  --  16   MONO PCT %  --  8   EOS PCT %  --  1     Results from last 7 days   Lab Units 07/17/24  0549 07/14/24  0455 07/13/24  2120   SODIUM mmol/L 137   < > 137   POTASSIUM mmol/L 4.2   < > 4.7   CHLORIDE mmol/L 103   < > 104   CO2 mmol/L 28   < > 27   BUN mg/dL 17   < > 21   CREATININE mg/dL 0.62   < > 0.82   ANION GAP mmol/L 6   < > 6   CALCIUM mg/dL 9.1   < > 9.0   ALBUMIN g/dL  --   --  3.6   TOTAL BILIRUBIN mg/dL  --   --  0.29   ALK PHOS U/L  --   --  93   ALT U/L  --   --  20   AST U/L  --   --  19   GLUCOSE RANDOM mg/dL 91   < > 93    < > = values in this interval not displayed.     Results from last 7 days   Lab Units 07/13/24  2120   INR  1.04     Results from last 7 days   Lab Units 07/15/24  0737   POC GLUCOSE mg/dl 111         Results from last 7 days   Lab Units 07/13/24  2244 07/13/24 2120   LACTIC ACID mmol/L 0.7  --    PROCALCITONIN ng/ml  --  <0.05       Lines/Drains:  Invasive Devices       Peripheral Intravenous Line  Duration             Peripheral IV 07/13/24 Dorsal (posterior);Left Forearm 4 days                          Imaging: Reviewed radiology reports from this admission including: chest xray and CT head    Recent Cultures (last 7 days):   Results from last 7 days   Lab Units 07/16/24  0413 07/13/24 2244   BLOOD CULTURE   --  No Growth at 72 hrs.  No Growth at 72 hrs.   LEGIONELLA URINARY ANTIGEN  Negative  --        Last 24 Hours Medication List:   Current Facility-Administered Medications   Medication Dose Route Frequency Provider Last Rate    acetaminophen  325 mg Rectal Q4H PRN Danitza Duarte PA-C      bisacodyl  10 mg Rectal Daily PRN Danitza  Janice Duarte PA-C      cefTRIAXone  1,000 mg Intravenous Q24H Danitza Duarte PA-C 1,000 mg (07/17/24 1555)    LORazepam  0.5 mg Intravenous Q6H PRN Danitza Duarte PA-C      multi-electrolyte  75 mL/hr Intravenous Continuous Danitza Duarte PA-C 75 mL/hr (07/18/24 0134)        Today, Patient Was Seen By: RONY Arias    **Please Note: This note may have been constructed using a voice recognition system.**

## 2024-07-18 NOTE — NURSING NOTE
Pt wife has left and pt is awake and alert. Deaf hx. Restless after wife has left and iv ativan given as per orders. Bed alarm on and 1/1 assessing from a distance for safety.

## 2024-07-19 ENCOUNTER — HOSPICE ADMISSION (OUTPATIENT)
Dept: HOME HOSPICE | Facility: HOSPICE | Age: 84
End: 2024-07-19
Payer: MEDICARE

## 2024-07-19 ENCOUNTER — HOME CARE VISIT (OUTPATIENT)
Dept: HOME HEALTH SERVICES | Facility: HOME HEALTHCARE | Age: 84
End: 2024-07-19
Payer: MEDICARE

## 2024-07-19 LAB
BACTERIA BLD CULT: NORMAL
BACTERIA BLD CULT: NORMAL

## 2024-07-19 PROCEDURE — 99233 SBSQ HOSP IP/OBS HIGH 50: CPT

## 2024-07-19 RX ADMIN — LORAZEPAM 0.5 MG: 2 INJECTION INTRAMUSCULAR; INTRAVENOUS at 01:38

## 2024-07-19 NOTE — ASSESSMENT & PLAN NOTE
Patient was recently diagnosed with a Proteus mirabilis UTI on 7/5/2024  Patient was taking Bactrim at home  Started on ceftriaxone, and transition to Flagyl to cover for aspiration and UTI.  After video barium swallow patient was made NPO.  Augmentin discontinued and ceftriaxone restarted - had 6 days of abt-dc 7/18 when transitioned to comfort care  No further labs, testing or treatments  .

## 2024-07-19 NOTE — ASSESSMENT & PLAN NOTE
"Presenting with increased confusion, anxiety and balance issues  Initial chest x-ray was concerning for right lower lobe pneumonia. Concern for aspiration   COVID/flu/rsv negative  Urinary antigens negative   Blood cultures x2 no growth after 4 days  Patient was initially put on ceftriaxone, and Flagyl - switched to Augmentin 7/15 D2, with plan to continue 7 day course  Augmentin since discontinued back on Rocephin due to n.p.o.  Appreciate GI input-family refusing peg  Appreciate input of speech therapy who are following  Video barium swallow 7/16: patient aspirated before, during and after swallow  Patient was made n.p.o.  7/17 Continued goals of care discussion-spoke with patient's daughter Allegra at length.  She states that they are not ready for hospice.  Declining feeding tube at this time.  They feel that food is the 1 thing that the patient enjoys and he feels that we are \"starving him\" or not feeding him.  He has been requesting food from the family.  They declined feeding tube feel he is high risk for pulling it out and could cause \"internal bleeding\" if he pulled tube out.  Daughter requests speech therapy reevaluate patient as he has waxing and waning mentation.  Discussed with speech therapy will do repeat video swallow today.  Daughter understands that patient is high risk for aspiration and understands the complications of same.  7/18 repeat video swallow failed-at length discussion with family regarding GOC. Family decided on hospice-eval placed  7/19 Accepted to hospice at home or SNF. Daughter states will be at SNF. Case management following. Referral made to [facility]. Daughter also going to see [facility] today and family will make decision  Update on 7/20/2024  Patient appears comfortable  Patient continues to eat and drink, likely no evidence of hypoxia at this time  Hopeful discharge planning for Monday, 7/22/2024 to the [facility] under the umbrella of " hospice  If the patient is to deteriorate here in house, no further inpatient testing, treatment, and workup will be implemented, patient will be treated with comfort care based medications.  Patient's family have verbalized the understanding of the risk of continuing to feed the patient in the setting of having multiple video barium swallow evaluations that have concluded that the patient is aspirating extensively.  They understand that sudden respiratory distress can occur, and that the patient can potentially pass away, they understand the risk since the patient is being transitioned to hospice, they would like to continue in this way.

## 2024-07-19 NOTE — PROGRESS NOTES
Patient:  FAUSTINO PETERSEN    MRN:  [id number]    Aidin Request ID:  [id number]    Level of care reserved:  Hospice    Partner Reserved:  formerly [facility], [place] [address] [phone]    Clinical needs requested:    Geography searched:  [id number]    Start of Service:    Request sent:  11:20am EDT on 7/19/2024 by Lavern Castro    Partner reserved:  12:14pm EDT on 7/19/2024 by Lavern Castro    Choice list shared:  12:05pm EDT on 7/19/2024 by Lavern Castro

## 2024-07-19 NOTE — ASSESSMENT & PLAN NOTE
POA  Likely secondary to infectious etiology and PMH dementia   Initial CT head-no acute pathology  Continue close monitoring  Mentation now baseline

## 2024-07-19 NOTE — PROGRESS NOTES
Patient:  FAUSTINO PETERSEN    MRN:  [id number]    Aidin Request ID:  [id number]    Level of care reserved:    Partner Reserved:    Clinical needs requested:    Geography searched:  [id number]    Start of Service:    Request sent:  11:20am EDT on 7/19/2024 by Lavern aCstro    Partner reserved:  by Lavern Castro    Choice list shared:  12:05pm EDT on 7/19/2024 by Lavern Castro

## 2024-07-19 NOTE — CASE MANAGEMENT
Case Management Discharge Planning Note    Patient name Thomas Chase  Location /-01 MRN [id number]  : 1940 Date 2024       Current Admission Date: 2024  Current Admission Diagnosis:Aspiration pneumonia (HCC)   Patient Active Problem List    Diagnosis Date Noted Date Diagnosed    Refusal of treatment 2024     H/O orthostatic hypotension 07/15/2024     Aspiration pneumonia (HCC) 2024     Acute metabolic encephalopathy 2024     UTI (urinary tract infection) 2024     Peripheral vascular disease, unspecified ([facility]) 2024     Abnormal urinalysis 2024     Benign prostatic hyperplasia without lower urinary tract symptoms 2024     Insomnia 2024     Abnormal CT of the head 2024     Hematuria 2024     Headache 2024     Behavior safety risk 2024     Bilateral lower extremity weakness 2024     Abnormal CT scan, lumbar spine 02/15/2024     Severe protein-calorie malnutrition (HCC) 2024     Debility 2024     Pharyngeal myoclonus 2023     Dysphagia 2023     Macular degeneration, age related 2023     H/O traumatic subdural hematoma 2023     Sensorineural hearing loss (SNHL), bilateral 2022     Balance disorder 2022     Right bundle-branch block 2022     Low BP 2022     Diastolic dysfunction 2022     Constipation due to neurogenic bowel 2022     Urinary retention 2022     SDH (subdural hematoma) ([facility]) 2022     Primary osteoarthritis of left knee 2022     Hygroma 2022     Right hand pain      Carpal tunnel syndrome on right      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[facility]) 2021     Overweight (BMI 25.0-29.9) 2021     Negative depression screening 2019     Hypercholesterolemia 2018     Borderline hypertension 2018       LOS (days): 5  Geometric Mean LOS (GMLOS) (days): 5  Days to GMLOS:-0.7     OBJECTIVE:  Risk of  Unplanned Readmission Score: 20.81         Current admission status: Inpatient   Preferred Pharmacy:   [id number]  [place] - [address]  [address] [address]  Phone: [phone] Fax: [phone]    Primary Care Provider: RONY Coe    Primary Insurance: MEDICARE  Secondary Insurance: [facility]    DISCHARGE DETAILS:    Discharge planning discussed with:: alek called Allegra at 14:12pm  Freedom of Choice: Yes  Comments - Freedom of Choice: pt has been approved for [facility] hospice  for home or snf -                Contacts  Patient Contacts: Allegra Chase  Relationship to Patient:: Family  Contact Method: Phone  Phone Number: [phone]  Reason/Outcome: Discharge Planning    Requested Home Health Care         Is the patient interested in HHC at discharge?: No    DME Referral Provided  Referral made for DME?: No    Other Referral/Resources/Interventions Provided:  Interventions: Assisted Living, SNF, Hospice  Referral Comments: [facility] is able to accept [facility] pt on [place] on hospice care - family is going to [place] for a tour- they do take hopsice pt's they will need to assess the pt prior to accepting - they couldl not accept until Monday    Would you like to participate in our [facility] service program?  : No - Declined    Treatment Team Recommendation: Hospice (hospice at snf vs nursing home- BLS)

## 2024-07-19 NOTE — SPEECH THERAPY NOTE
Speech Language/Pathology  Chart Reviewed.Pt family wishing to pursue comfort measures. ST will d/c order.

## 2024-07-19 NOTE — PROGRESS NOTES
"[facility]  Progress Note  Name: Thomas Chase I  MRN: [id number]  Unit/Bed#: -01 I Date of Admission: 7/13/2024   Date of Service: 7/19/2024 I Hospital Day: 5    Assessment & Plan   * Aspiration pneumonia (HCC)  Assessment & Plan  Presenting with increased confusion, anxiety and balance issues  Initial chest x-ray was concerning for right lower lobe pneumonia. Concern for aspiration   COVID/flu/rsv negative  Urinary antigens negative   Blood cultures x2 no growth after 4 days  Patient was initially put on ceftriaxone, and Flagyl - switched to Augmentin 7/15 D2, with plan to continue 7 day course  Augmentin since discontinued back on Rocephin due to n.p.o.  Appreciate GI input-family refusing peg  Appreciate input of speech therapy who are following  Video barium swallow 7/16: patient aspirated before, during and after swallow  Patient was made n.p.o.  7/17 Continued goals of care discussion-spoke with patient's daughter Allegra at length.  She states that they are not ready for hospice.  Declining feeding tube at this time.  They feel that food is the 1 thing that the patient enjoys and he feels that we are \"starving him\" or not feeding him.  He has been requesting food from the family.  They declined feeding tube feel he is high risk for pulling it out and could cause \"internal bleeding\" if he pulled tube out.  Daughter requests speech therapy reevaluate patient as he has waxing and waning mentation.  Discussed with speech therapy will do repeat video swallow today.  Daughter understands that patient is high risk for aspiration and understands the complications of same.  7/18 repeat video swallow failed-at length discussion with family regarding GOC. Family decided on hospice-eval placed  7/19 Accepted to hospice at home or SNF. Daughter states will be at SNF. Case management following. Referral made to [facility]. Daughter also going to see [facility] " today and family will make decision      UTI (urinary tract infection)  Assessment & Plan  Patient was recently diagnosed with a Proteus mirabilis UTI on 7/5/2024  Patient was taking Bactrim at home  Started on ceftriaxone, and transition to Flagyl to cover for aspiration and UTI.  After video barium swallow patient was made NPO.  Augmentin discontinued and ceftriaxone restarted - had 6 days of abt-dc 7/18 when transitioned to comfort care  No further labs, testing or treatments  .    Acute metabolic encephalopathy  Assessment & Plan  POA  Likely secondary to infectious etiology and PMH dementia   Initial CT head-no acute pathology  Continue close monitoring  Mentation now baseline      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HCC)  Assessment & Plan  CT head with no acute changes consistent with this, chronic vascular changes  Home sertraline on hold for NPO status     Dysphagia  Assessment & Plan  Formal speech evaluation recommending puréed diet and honey thick liquid  VBS 7/16 - see aspiration for full plan   7/18 Patient failed second video swallow. Had at length goals of care discussion with family and decision made to transition to comfort care/hospice  7/19 Accepted to hospice at home versus SNF. Case management following and aware.    H/O traumatic subdural hematoma  Assessment & Plan  No acute changes on imaging      Diastolic dysfunction  Assessment & Plan  Without exacerbation  Patient examines euvolemic  Statin on hold for NPO status   Now transitioning to hospice services    H/O orthostatic hypotension  Assessment & Plan  Midodrine on hold for NPO status   Blood pressure has been well controlled    Bilateral lower extremity weakness  Assessment & Plan  PT/OT recommending level II  Transitioning to hospice, CM following     Sensorineural hearing loss (SNHL), bilateral  Assessment & Plan  Patient is very hard of hearing      Benign prostatic hyperplasia without lower urinary tract symptoms  Assessment &  Plan  Tamsulosin on hold for NPO status   Continue to monitor urinary output               VTE Pharmacologic Prophylaxis: VTE Score: 5 High Risk (Score >/= 5) - Pharmacological DVT Prophylaxis Ordered: enoxaparin (Lovenox). Sequential Compression Devices Ordered.    Mobility:   Basic Mobility Inpatient Raw Score: 13  JH-HLM Goal: 4: Move to chair/commode  JH-HLM Achieved: 5: Stand (1 or more minutes)  JH-HLM Goal achieved. Continue to encourage appropriate mobility.    Patient Centered Rounds: I performed bedside rounds with nursing staff today.   Discussions with Specialists or Other Care Team Provider: Speech therapy, hospice, GI, nursing, case management    Education and Discussions with Family / Patient: Updated  (daughter) at bedside.    Total Time Spent on Date of Encounter in care of patient: 45 mins. This time was spent on one or more of the following: performing physical exam; counseling and coordination of care; obtaining or reviewing history; documenting in the medical record; reviewing/ordering tests, medications or procedures; communicating with other healthcare professionals and discussing with patient's family/caregivers.    Current Length of Stay: 5 day(s)  Current Patient Status: Inpatient   Certification Statement: The patient will continue to require additional inpatient hospital stay due to coordination of care-transitioning to hospice     Discharge Plan: Patient appears comfortable. Daughter at bedside. Patient has been tolerating puree diet without coughing per dtr. He is being transitioned to hospice services. He is allowed pleasure feeds. High risk of aspiration was explained to the family at length yesterday and they wish to allow him to have soft diet as tolerated like he was prior to arrival. Case management is following and making referrals.    Code Status: Level 4 - Comfort Care    Subjective:   Offers no complaints of discomfort, pleasant. Daughter visiting.    Objective:      Vitals:   Temp (24hrs), Av.1 °F (36.7 °C), Min:98.1 °F (36.7 °C), Max:98.1 °F (36.7 °C)    Temp:  [98.1 °F (36.7 °C)] 98.1 °F (36.7 °C)  HR:  [67-73] 67  Resp:  [18-19] 19  BP: (131)/(80) 131/80  SpO2:  [96 %-98 %] 96 %  Body mass index is 19.49 kg/m².     Input and Output Summary (last 24 hours):     Intake/Output Summary (Last 24 hours) at 2024 1431  Last data filed at 2024 1236  Gross per 24 hour   Intake 800 ml   Output 550 ml   Net 250 ml       Physical Exam:   Physical Exam  Vitals and nursing note reviewed.   Constitutional:       General: He is not in acute distress.     Appearance: He is well-developed and normal weight.   HENT:      Ears:      Comments: [place]        Mouth/Throat:      Mouth: Mucous membranes are dry.   Cardiovascular:      Rate and Rhythm: Normal rate and regular rhythm.      Pulses: Normal pulses.      Heart sounds: Normal heart sounds. No murmur heard.  Pulmonary:      Effort: Pulmonary effort is normal. No respiratory distress.      Breath sounds: Normal breath sounds. No wheezing or rales.   Abdominal:      General: Bowel sounds are normal. There is no distension.      Palpations: Abdomen is soft.      Tenderness: There is no abdominal tenderness. There is no guarding.   Musculoskeletal:         General: No swelling. Normal range of motion.   Skin:     General: Skin is warm and dry.      Capillary Refill: Capillary refill takes less than 2 seconds.   Neurological:      General: No focal deficit present.      Mental Status: He is alert. Mental status is at baseline.   Psychiatric:         Mood and Affect: Mood normal.          Additional Data:     Labs:  Results from last 7 days   Lab Units 24  0549 2424   WBC Thousand/uL 7.35 6.44   HEMOGLOBIN g/dL 12.8 12.0   HEMATOCRIT % 39.6 37.2   PLATELETS Thousands/uL 489* 424*   SEGS PCT %  --  74   LYMPHO PCT %  --  16   MONO PCT %  --  8   EOS PCT %  --  1     Results from last 7 days   Lab Units 24  0549  07/14/24  0455 07/13/24  2120   SODIUM mmol/L 137   < > 137   POTASSIUM mmol/L 4.2   < > 4.7   CHLORIDE mmol/L 103   < > 104   CO2 mmol/L 28   < > 27   BUN mg/dL 17   < > 21   CREATININE mg/dL 0.62   < > 0.82   ANION GAP mmol/L 6   < > 6   CALCIUM mg/dL 9.1   < > 9.0   ALBUMIN g/dL  --   --  3.6   TOTAL BILIRUBIN mg/dL  --   --  0.29   ALK PHOS U/L  --   --  93   ALT U/L  --   --  20   AST U/L  --   --  19   GLUCOSE RANDOM mg/dL 91   < > 93    < > = values in this interval not displayed.     Results from last 7 days   Lab Units 07/13/24  2120   INR  1.04     Results from last 7 days   Lab Units 07/15/24  0737   POC GLUCOSE mg/dl 111         Results from last 7 days   Lab Units 07/13/24  2244 07/13/24 2120   LACTIC ACID mmol/L 0.7  --    PROCALCITONIN ng/ml  --  <0.05       Lines/Drains:  Invasive Devices       Peripheral Intravenous Line  Duration             Peripheral IV 07/19/24 Distal;Left;Upper;Ventral (anterior) Arm <1 day                          Imaging: Reviewed radiology reports from this admission including: chest xray and CT head    Recent Cultures (last 7 days):   Results from last 7 days   Lab Units 07/16/24  0413 07/13/24  2244   BLOOD CULTURE   --  No Growth After 4 Days.  No Growth After 4 Days.   LEGIONELLA URINARY ANTIGEN  Negative  --        Last 24 Hours Medication List:   Current Facility-Administered Medications   Medication Dose Route Frequency Provider Last Rate    acetaminophen  325 mg Rectal Q4H PRN Danitza Duarte PA-C      bisacodyl  10 mg Rectal Daily PRN Danitza Duarte PA-C      LORazepam  0.5 mg Intravenous Q4H PRN RONY Arias      morphine injection  2 mg Intravenous Q4H PRN RONY Arias          Today, Patient Was Seen By: RONY Arias    **Please Note: This note may have been constructed using a voice recognition system.**

## 2024-07-19 NOTE — ASSESSMENT & PLAN NOTE
Without exacerbation  Patient examines euvolemic  All medications have been discontinued in view of the patient being transitioned to hospice

## 2024-07-19 NOTE — ASSESSMENT & PLAN NOTE
Tamsulosin has been discontinued in view of transition to hospice  Continue to monitor urinary output

## 2024-07-19 NOTE — PLAN OF CARE
Problem: Potential for Falls  Goal: Patient will remain free of falls  Description: INTERVENTIONS:  - Educate patient/family on patient safety including physical limitations  - Instruct patient to call for assistance with activity   - Consult OT/PT to assist with strengthening/mobility   - Keep Call bell within reach  - Keep bed low and locked with side rails adjusted as appropriate  - Keep care items and personal belongings within reach  - Initiate and maintain comfort rounds  - Make Fall Risk Sign visible to staff  - Offer Toileting every 2 Hours, in advance of need  - Initiate/Maintain bed alarm  - Obtain necessary fall risk management equipment: alarms  - Apply yellow socks and bracelet for high fall risk patients  - Consider moving patient to room near nurses station  Outcome: Not Progressing     Problem: Prexisting or High Potential for Compromised Skin Integrity  Goal: Skin integrity is maintained or improved  Description: INTERVENTIONS:  - Identify patients at risk for skin breakdown  - Assess and monitor skin integrity  - Assess and monitor nutrition and hydration status  - Monitor labs   - Assess for incontinence   - Turn and reposition patient  - Assist with mobility/ambulation  - Relieve pressure over bony prominences  - Avoid friction and shearing  - Provide appropriate hygiene as needed including keeping skin clean and dry  - Evaluate need for skin moisturizer/barrier cream  - Collaborate with interdisciplinary team   - Patient/family teaching  - Consider wound care consult   Outcome: Not Progressing     Problem: PAIN - ADULT  Goal: Verbalizes/displays adequate comfort level or baseline comfort level  Description: Interventions:  - Encourage patient to monitor pain and request assistance  - Assess pain using appropriate pain scale  - Administer analgesics based on type and severity of pain and evaluate response  - Implement non-pharmacological measures as appropriate and evaluate response  - Consider  cultural and social influences on pain and pain management  - Notify physician/advanced practitioner if interventions unsuccessful or patient reports new pain  Outcome: Not Progressing     Problem: INFECTION - ADULT  Goal: Absence or prevention of progression during hospitalization  Description: INTERVENTIONS:  - Assess and monitor for signs and symptoms of infection  - Monitor lab/diagnostic results  - Monitor all insertion sites, i.e. indwelling lines, tubes, and drains  - Monitor endotracheal if appropriate and nasal secretions for changes in amount and color  - [place] appropriate cooling/warming therapies per order  - Administer medications as ordered  - Instruct and encourage patient and family to use good hand hygiene technique  - Identify and instruct in appropriate isolation precautions for identified infection/condition  Outcome: Not Progressing     Problem: SAFETY ADULT  Goal: Patient will remain free of falls  Description: INTERVENTIONS:  - Educate patient/family on patient safety including physical limitations  - Instruct patient to call for assistance with activity   - Consult OT/PT to assist with strengthening/mobility   - Keep Call bell within reach  - Keep bed low and locked with side rails adjusted as appropriate  - Keep care items and personal belongings within reach  - Initiate and maintain comfort rounds  - Make Fall Risk Sign visible to staff  - Offer Toileting every 2 Hours, in advance of need  - Initiate/Maintain bed alarm  - Obtain necessary fall risk management equipment: alarms  - Apply yellow socks and bracelet for high fall risk patients  - Consider moving patient to room near nurses station  Outcome: Not Progressing  Goal: Maintain or return to baseline ADL function  Description: INTERVENTIONS:  -  Assess patient's ability to carry out ADLs; assess patient's baseline for ADL function and identify physical deficits which impact ability to perform ADLs (bathing, care of mouth/teeth,  toileting, grooming, dressing, etc.)  - Assess/evaluate cause of self-care deficits   - Assess range of motion  - Assess patient's mobility; develop plan if impaired  - Assess patient's need for assistive devices and provide as appropriate  - Encourage maximum independence but intervene and supervise when necessary  - Involve family in performance of ADLs  - Assess for home care needs following discharge   - Consider OT consult to assist with ADL evaluation and planning for discharge  - Provide patient education as appropriate  Outcome: Not Progressing  Goal: Maintains/Returns to pre admission functional level  Description: INTERVENTIONS:  - Perform AM-PAC 6 Click Basic Mobility/ Daily Activity assessment daily.  - Set and communicate daily mobility goal to care team and patient/family/caregiver.   - Collaborate with rehabilitation services on mobility goals if consulted  - Perform Range of Motion 3 times a day.  - Reposition patient every 2 hours.  - Dangle patient 3 times a day  - Stand patient 3 times a day  - Ambulate patient 3 times a day  - Out of bed to chair 3 times a day   - Out of bed for meals 3 times a day  - Out of bed for toileting  - Record patient progress and toleration of activity level   Outcome: Not Progressing     Problem: DISCHARGE PLANNING  Goal: Discharge to home or other facility with appropriate resources  Description: INTERVENTIONS:  - Identify barriers to discharge w/patient and caregiver  - Arrange for needed discharge resources and transportation as appropriate  - Identify discharge learning needs (meds, wound care, etc.)  - Arrange for interpretive services to assist at discharge as needed  - Refer to Case Management Department for coordinating discharge planning if the patient needs post-hospital services based on physician/advanced practitioner order or complex needs related to functional status, cognitive ability, or social support system  Outcome: Not Progressing     Problem:  Knowledge Deficit  Goal: Patient/family/caregiver demonstrates understanding of disease process, treatment plan, medications, and discharge instructions  Description: Complete learning assessment and assess knowledge base.  Interventions:  - Provide teaching at level of understanding  - Provide teaching via preferred learning methods  Outcome: Not Progressing     Problem: GENITOURINARY - ADULT  Goal: Maintains or returns to baseline urinary function  Description: INTERVENTIONS:  - Assess urinary function  - Encourage oral fluids to ensure adequate hydration if ordered  - Administer IV fluids as ordered to ensure adequate hydration  - Administer ordered medications as needed  - Offer frequent toileting  - Follow urinary retention protocol if ordered  Outcome: Not Progressing  Goal: Absence of urinary retention  Description: INTERVENTIONS:  - Assess patient’s ability to void and empty bladder  - Monitor I/O  - Bladder scan as needed  - Discuss with physician/AP medications to alleviate retention as needed  - Discuss catheterization for long term situations as appropriate  Outcome: Not Progressing     Problem: METABOLIC, FLUID AND ELECTROLYTES - ADULT  Goal: Electrolytes maintained within normal limits  Description: INTERVENTIONS:  - Monitor labs and assess patient for signs and symptoms of electrolyte imbalances  - Administer electrolyte replacement as ordered  - Monitor response to electrolyte replacements, including repeat lab results as appropriate  - Instruct patient on fluid and nutrition as appropriate  Outcome: Not Progressing  Goal: Fluid balance maintained  Description: INTERVENTIONS:  - Monitor labs   - Monitor I/O and WT  - Instruct patient on fluid and nutrition as appropriate  - Assess for signs & symptoms of volume excess or deficit  Outcome: Not Progressing  Goal: Glucose maintained within target range  Description: INTERVENTIONS:  - Monitor Blood Glucose as ordered  - Assess for signs and symptoms of  hyperglycemia and hypoglycemia  - Administer ordered medications to maintain glucose within target range  - Assess nutritional intake and initiate nutrition service referral as needed  Outcome: Not Progressing     Problem: MUSCULOSKELETAL - ADULT  Goal: Maintain or return mobility to safest level of function  Description: INTERVENTIONS:  - Assess patient's ability to carry out ADLs; assess patient's baseline for ADL function and identify physical deficits which impact ability to perform ADLs (bathing, care of mouth/teeth, toileting, grooming, dressing, etc.)  - Assess/evaluate cause of self-care deficits   - Assess range of motion  - Assess patient's mobility  - Assess patient's need for assistive devices and provide as appropriate  - Encourage maximum independence but intervene and supervise when necessary  - Involve family in performance of ADLs  - Assess for home care needs following discharge   - Consider OT consult to assist with ADL evaluation and planning for discharge  - Provide patient education as appropriate  Outcome: Not Progressing     Problem: Nutrition/Hydration-ADULT  Goal: Nutrient/Hydration intake appropriate for improving, restoring or maintaining nutritional needs  Description: Monitor and assess patient's nutrition/hydration status for malnutrition. Collaborate with interdisciplinary team and initiate plan and interventions as ordered.  Monitor patient's weight and dietary intake as ordered or per policy. Utilize nutrition screening tool and intervene as necessary. Determine patient's food preferences and provide high-protein, high-caloric foods as appropriate.     INTERVENTIONS:  - Monitor oral intake, urinary output, labs, and treatment plans  - Assess nutrition and hydration status and recommend course of action  - Evaluate amount of meals eaten  - Assist patient with eating if necessary   - Allow adequate time for meals  - Recommend/ encourage appropriate diets, oral nutritional supplements,  and vitamin/mineral supplements  - Order, calculate, and assess calorie counts as needed  - Recommend, monitor, and adjust tube feedings and TPN/PPN based on assessed needs  - Assess need for intravenous fluids  - Provide specific nutrition/hydration education as appropriate  - Include patient/family/caregiver in decisions related to nutrition  Outcome: Not Progressing

## 2024-07-19 NOTE — HOSPICE NOTE
Received referral, patient was approved for [facility] hospice (at home or SNF). TPC placed to billy Dinh, hospice services and medicare guidelines were reviewed she was agreeable. She is currently looking into placement for her father. I updated CALVIN Puckett. Will continue to follow patient

## 2024-07-19 NOTE — CASE MANAGEMENT
Case Management Discharge Planning Note    Patient name Thomas Chase  Location /-01 MRN [id number]  : 1940 Date 2024       Current Admission Date: 2024  Current Admission Diagnosis:Aspiration pneumonia (HCC)   Patient Active Problem List    Diagnosis Date Noted Date Diagnosed    Refusal of treatment 2024     H/O orthostatic hypotension 07/15/2024     Aspiration pneumonia (HCC) 2024     Acute metabolic encephalopathy 2024     UTI (urinary tract infection) 2024     Peripheral vascular disease, unspecified ([facility]) 2024     Abnormal urinalysis 2024     Benign prostatic hyperplasia without lower urinary tract symptoms 2024     Insomnia 2024     Abnormal CT of the head 2024     Hematuria 2024     Headache 2024     Behavior safety risk 2024     Bilateral lower extremity weakness 2024     Abnormal CT scan, lumbar spine 02/15/2024     Severe protein-calorie malnutrition (HCC) 2024     Debility 2024     Pharyngeal myoclonus 2023     Dysphagia 2023     Macular degeneration, age related 2023     H/O traumatic subdural hematoma 2023     Sensorineural hearing loss (SNHL), bilateral 2022     Balance disorder 2022     Right bundle-branch block 2022     Low BP 2022     Diastolic dysfunction 2022     Constipation due to neurogenic bowel 2022     Urinary retention 2022     SDH (subdural hematoma) ([facility]) 2022     Primary osteoarthritis of left knee 2022     Hygroma 2022     Right hand pain      Carpal tunnel syndrome on right      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[facility]) 2021     Overweight (BMI 25.0-29.9) 2021     Negative depression screening 2019     Hypercholesterolemia 2018     Borderline hypertension 2018       LOS (days): 5  Geometric Mean LOS (GMLOS) (days): 5  Days to GMLOS:-0.5     OBJECTIVE:  Risk of  Unplanned Readmission Score: 21.12         Current admission status: Inpatient   Preferred Pharmacy:   [id number]  [place] - [address]  [address] [address]  Phone: [phone] Fax: [phone]    Primary Care Provider: RONY Coe    Primary Insurance: MEDICARE  Secondary Insurance: [facility]    DISCHARGE DETAILS:    Discharge planning discussed with:: alek called Allegra at 10:46 am & 11:06 am  Freedom of Choice: Yes  Comments - Freedom of Choice: cm received a hospice consult- family request is for [facility] hospice- referral was sent with permission via  kylee- cm did call [facility] to see if they are able to accept patientds  on hospice - they are able, but they need to assess the pt to make sure they are able to meet the pt's needs  CM contacted family/caregiver?: Yes             Contacts  Patient Contacts: Allegra Chase  Relationship to Patient:: Family (daughter)  Contact Method: Phone  Phone Number: [phone]  Reason/Outcome: Discharge Planning    Requested Home Health Care         Is the patient interested in HHC at discharge?: No    DME Referral Provided  Referral made for DME?: No    Other Referral/Resources/Interventions Provided:  Interventions: SNF, Hospice, Assisted Living  Referral Comments: family is at [facility] now  touring and discussing possible admission on hospice care- cm did call [facility] for the family to see if they are able to accept pt's on hospice care - they do accept pt's that need hospice care, but the pt needs to be assessed to make sure they are able to meet the pt's needs    Would you like to participate in our [facility] service program?  : No - Declined    Treatment Team Recommendation: Hospice (hospice care- BLS)

## 2024-07-20 PROCEDURE — 99232 SBSQ HOSP IP/OBS MODERATE 35: CPT | Performed by: HOSPITALIST

## 2024-07-20 RX ADMIN — LORAZEPAM 0.5 MG: 2 INJECTION INTRAMUSCULAR; INTRAVENOUS at 19:51

## 2024-07-20 RX ADMIN — BISACODYL 10 MG: 10 SUPPOSITORY RECTAL at 09:32

## 2024-07-20 NOTE — PROGRESS NOTES
"[facility]  Progress Note  Name: Thomas Chase I  MRN: [id number]  Unit/Bed#: -01 I Date of Admission: 7/13/2024   Date of Service: 7/20/2024 I Hospital Day: 6    Assessment & Plan   * Aspiration pneumonia (HCC)  Assessment & Plan  Presenting with increased confusion, anxiety and balance issues  Initial chest x-ray was concerning for right lower lobe pneumonia. Concern for aspiration   COVID/flu/rsv negative  Urinary antigens negative   Blood cultures x2 no growth after 4 days  Patient was initially put on ceftriaxone, and Flagyl - switched to Augmentin 7/15 D2, with plan to continue 7 day course  Augmentin since discontinued back on Rocephin due to n.p.o.  Appreciate GI input-family refusing peg  Appreciate input of speech therapy who are following  Video barium swallow 7/16: patient aspirated before, during and after swallow  Patient was made n.p.o.  7/17 Continued goals of care discussion-spoke with patient's daughter Allegra at length.  She states that they are not ready for hospice.  Declining feeding tube at this time.  They feel that food is the 1 thing that the patient enjoys and he feels that we are \"starving him\" or not feeding him.  He has been requesting food from the family.  They declined feeding tube feel he is high risk for pulling it out and could cause \"internal bleeding\" if he pulled tube out.  Daughter requests speech therapy reevaluate patient as he has waxing and waning mentation.  Discussed with speech therapy will do repeat video swallow today.  Daughter understands that patient is high risk for aspiration and understands the complications of same.  7/18 repeat video swallow failed-at length discussion with family regarding GOC. Family decided on hospice-eval placed  7/19 Accepted to hospice at home or SNF. Daughter states will be at SNF. Case management following. Referral made to [facility]. Daughter also going to see [facility] " today and family will make decision  Update on 7/20/2024  Patient appears comfortable  Patient continues to eat and drink, likely no evidence of hypoxia at this time  Hopeful discharge planning for Monday, 7/22/2024 to [facility] under the umbrella of hospice  If the patient is to deteriorate here in house, no further inpatient testing, treatment, and workup will be implemented, patient will be treated with comfort care based medications.  Patient's family have verbalized the understanding of the risk of continuing to feed the patient in the setting of having multiple video barium swallow evaluations that have concluded that the patient is aspirating extensively.  They understand that sudden respiratory distress can occur, and that the patient can potentially pass away, they understand the risk since the patient is being transitioned to hospice, they would like to continue in this way.      Dysphagia  Assessment & Plan  VBS 7/16 - see aspiration for full plan   7/18 Patient failed second video swallow. Had at length goals of care discussion with family and decision made to transition to comfort care/hospice  7/19 Accepted to hospice at home versus SNF. Case management following and aware.  Patient is being transitioned to a SNF with hospice    Acute metabolic encephalopathy  Assessment & Plan  POA  Likely secondary to infectious etiology and PMH dementia   Initial CT head-no acute pathology  Continue close monitoring  Mentation now baseline      UTI (urinary tract infection)  Assessment & Plan  Patient was recently diagnosed with a Proteus mirabilis UTI on 7/5/2024  Patient was taking Bactrim at home  Started on ceftriaxone, and transition to Flagyl to cover for aspiration and UTI.  After video barium swallow patient was made NPO.  Augmentin discontinued and ceftriaxone restarted - had 6 days of abt-dc 7/18 when transitioned to comfort care  No further labs, testing or treatments  .    Diastolic  dysfunction  Assessment & Plan  Without exacerbation  Patient examines euvolemic  All medications have been discontinued in view of the patient being transitioned to hospice    Bilateral lower extremity weakness  Assessment & Plan  PT/OT recommending level II  Transitioning to hospice, CM following     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HCC)  Assessment & Plan  CT head with no acute changes consistent with this, chronic vascular changes  Sertraline has been discontinued in view of the patient being transitioned to hospice    H/O traumatic subdural hematoma  Assessment & Plan  No acute changes on imaging      Benign prostatic hyperplasia without lower urinary tract symptoms  Assessment & Plan  Tamsulosin has been discontinued in view of transition to hospice  Continue to monitor urinary output    Sensorineural hearing loss (SNHL), bilateral  Assessment & Plan  Patient is very hard of hearing      H/O orthostatic hypotension  Assessment & Plan  No more midodrine  Blood pressure has been well controlled               VTE Pharmacologic Prophylaxis: VTE Score: 5  no pharmacological VTE prophylaxis is needed in this patient being transitioned to hospice    Mobility:   Basic Mobility Inpatient Raw Score: 13  JH-HLM Goal: 4: Move to chair/commode  JH-HLM Achieved: 5: Stand (1 or more minutes)  JH-HLM Goal NOT achieved. Continue with multidisciplinary rounding and encourage appropriate mobility to improve upon JH-HLM goals.    Patient Centered Rounds: I performed bedside rounds with nursing staff today.   Discussions with Specialists or Other Care Team Provider: Case management    Education and Discussions with Family / Patient: Updated  (wife) at bedside.    Total Time Spent on Date of Encounter in care of patient: 35 mins. This time was spent on one or more of the following: performing physical exam; counseling and coordination of care; obtaining or reviewing history; documenting in the medical record;  reviewing/ordering tests, medications or procedures; communicating with other healthcare professionals and discussing with patient's family/caregivers.    Current Length of Stay: 6 day(s)  Current Patient Status: Inpatient   Certification Statement: The patient will continue to require additional inpatient hospital stay due to the need for placement and transition to hospice  Discharge Plan: Anticipate discharge in 48 hrs to rehab facility.  Patient will be discharged with hospice    Code Status: Level 4 - Comfort Care    Subjective:   Patient seen, resting in bed, for the most part is nonverbal, makes eye contact and tracks    Objective:     Vitals:   Temp (24hrs), Av °F (37.2 °C), Min:99 °F (37.2 °C), Max:99 °F (37.2 °C)    Temp:  [99 °F (37.2 °C)] 99 °F (37.2 °C)  HR:  [71-76] 71  Resp:  [17-18] 17  BP: (102)/(69) 102/69  SpO2:  [95 %-97 %] 95 %  Body mass index is 19.49 kg/m².     Input and Output Summary (last 24 hours):     Intake/Output Summary (Last 24 hours) at 2024 1336  Last data filed at 2024 1243  Gross per 24 hour   Intake 1080 ml   Output 450 ml   Net 630 ml       Physical Exam:   Physical Exam  Vitals and nursing note reviewed.   Constitutional:       General: He is not in acute distress.     Appearance: Normal appearance. He is not ill-appearing.   HENT:      Head: Normocephalic and atraumatic.      Nose: Nose normal.   Eyes:      Extraocular Movements: Extraocular movements intact.      Pupils: Pupils are equal, round, and reactive to light.   Cardiovascular:      Rate and Rhythm: Normal rate and regular rhythm.      Pulses: Normal pulses.      Heart sounds: Normal heart sounds. No murmur heard.     No friction rub. No gallop.   Pulmonary:      Effort: Pulmonary effort is normal.      Breath sounds: Normal breath sounds.   Abdominal:      General: There is no distension.      Palpations: Abdomen is soft. There is no mass.      Tenderness: There is no abdominal tenderness. There is no  guarding or rebound.   Musculoskeletal:         General: No swelling or tenderness. Normal range of motion.      Cervical back: Normal range of motion and neck supple. No rigidity. No muscular tenderness.      Right lower leg: No edema.      Left lower leg: No edema.   Skin:     General: Skin is warm.      Capillary Refill: Capillary refill takes less than 2 seconds.      Findings: No erythema or rash.   Neurological:      General: No focal deficit present.      Mental Status: He is alert. Mental status is at baseline.      Comments: Nonverbal, at baseline in the setting of having dementia   Psychiatric:         Mood and Affect: Mood normal.         Behavior: Behavior normal.          Additional Data:     Labs:  Results from last 7 days   Lab Units 07/17/24  0549 07/16/24  0524   WBC Thousand/uL 7.35 6.44   HEMOGLOBIN g/dL 12.8 12.0   HEMATOCRIT % 39.6 37.2   PLATELETS Thousands/uL 489* 424*   SEGS PCT %  --  74   LYMPHO PCT %  --  16   MONO PCT %  --  8   EOS PCT %  --  1     Results from last 7 days   Lab Units 07/17/24  0549 07/14/24  0455 07/13/24  2120   SODIUM mmol/L 137   < > 137   POTASSIUM mmol/L 4.2   < > 4.7   CHLORIDE mmol/L 103   < > 104   CO2 mmol/L 28   < > 27   BUN mg/dL 17   < > 21   CREATININE mg/dL 0.62   < > 0.82   ANION GAP mmol/L 6   < > 6   CALCIUM mg/dL 9.1   < > 9.0   ALBUMIN g/dL  --   --  3.6   TOTAL BILIRUBIN mg/dL  --   --  0.29   ALK PHOS U/L  --   --  93   ALT U/L  --   --  20   AST U/L  --   --  19   GLUCOSE RANDOM mg/dL 91   < > 93    < > = values in this interval not displayed.     Results from last 7 days   Lab Units 07/13/24  2120   INR  1.04     Results from last 7 days   Lab Units 07/15/24  0737   POC GLUCOSE mg/dl 111         Results from last 7 days   Lab Units 07/13/24  2244 07/13/24  2120   LACTIC ACID mmol/L 0.7  --    PROCALCITONIN ng/ml  --  <0.05       Lines/Drains:  Invasive Devices       Peripheral Intravenous Line  Duration             Peripheral IV 07/19/24  Distal;Left;Upper;Ventral (anterior) Arm 1 day                          Imaging: No pertinent imaging reviewed.    Recent Cultures (last 7 days):   Results from last 7 days   Lab Units 07/16/24  0413 07/13/24  2244   BLOOD CULTURE   --  No Growth After 5 Days.  No Growth After 5 Days.   LEGIONELLA URINARY ANTIGEN  Negative  --        Last 24 Hours Medication List:   Current Facility-Administered Medications   Medication Dose Route Frequency Provider Last Rate    acetaminophen  325 mg Rectal Q4H PRN Danitza Duarte PA-C      bisacodyl  10 mg Rectal Daily PRN Danitza Duarte PA-C      LORazepam  0.5 mg Intravenous Q4H PRN RONY Arias      morphine injection  2 mg Intravenous Q4H PRN RONY Arias          Today, Patient Was Seen By: Tameka Zamora MD    **Please Note: This note may have been constructed using a voice recognition system.**

## 2024-07-20 NOTE — PLAN OF CARE
Problem: Potential for Falls  Goal: Patient will remain free of falls  Description: INTERVENTIONS:  - Educate patient/family on patient safety including physical limitations  - Instruct patient to call for assistance with activity   - Consult OT/PT to assist with strengthening/mobility   - Keep Call bell within reach  - Keep bed low and locked with side rails adjusted as appropriate  - Keep care items and personal belongings within reach  - Initiate and maintain comfort rounds  - Make Fall Risk Sign visible to staff  - Offer Toileting every 2 Hours, in advance of need  - Initiate/Maintain bed alarm  - Obtain necessary fall risk management equipment:   - Apply yellow socks and bracelet for high fall risk patients  - Consider moving patient to room near nurses station  Outcome: Progressing     Problem: Prexisting or High Potential for Compromised Skin Integrity  Goal: Skin integrity is maintained or improved  Description: INTERVENTIONS:  - Identify patients at risk for skin breakdown  - Assess and monitor skin integrity  - Assess and monitor nutrition and hydration status  - Monitor labs   - Assess for incontinence   - Turn and reposition patient  - Assist with mobility/ambulation  - Relieve pressure over bony prominences  - Avoid friction and shearing  - Provide appropriate hygiene as needed including keeping skin clean and dry  - Evaluate need for skin moisturizer/barrier cream  - Collaborate with interdisciplinary team   - Patient/family teaching  - Consider wound care consult   Outcome: Progressing     Problem: PAIN - ADULT  Goal: Verbalizes/displays adequate comfort level or baseline comfort level  Description: Interventions:  - Encourage patient to monitor pain and request assistance  - Assess pain using appropriate pain scale  - Administer analgesics based on type and severity of pain and evaluate response  - Implement non-pharmacological measures as appropriate and evaluate response  - Consider cultural and  social influences on pain and pain management  - Notify physician/advanced practitioner if interventions unsuccessful or patient reports new pain  Outcome: Progressing     Problem: INFECTION - ADULT  Goal: Absence or prevention of progression during hospitalization  Description: INTERVENTIONS:  - Assess and monitor for signs and symptoms of infection  - Monitor lab/diagnostic results  - Monitor all insertion sites, i.e. indwelling lines, tubes, and drains  - Monitor endotracheal if appropriate and nasal secretions for changes in amount and color  - Bruce appropriate cooling/warming therapies per order  - Administer medications as ordered  - Instruct and encourage patient and family to use good hand hygiene technique  - Identify and instruct in appropriate isolation precautions for identified infection/condition  Outcome: Progressing     Problem: SAFETY ADULT  Goal: Patient will remain free of falls  Description: INTERVENTIONS:  - Educate patient/family on patient safety including physical limitations  - Instruct patient to call for assistance with activity   - Consult OT/PT to assist with strengthening/mobility   - Keep Call bell within reach  - Keep bed low and locked with side rails adjusted as appropriate  - Keep care items and personal belongings within reach  - Initiate and maintain comfort rounds  - Make Fall Risk Sign visible to staff  - Offer Toileting every 2 Hours, in advance of need  - Initiate/Maintain bed alarm  - Obtain necessary fall risk management equipment:   - Apply yellow socks and bracelet for high fall risk patients  - Consider moving patient to room near nurses station  Outcome: Progressing  Goal: Maintain or return to baseline ADL function  Description: INTERVENTIONS:  -  Assess patient's ability to carry out ADLs; assess patient's baseline for ADL function and identify physical deficits which impact ability to perform ADLs (bathing, care of mouth/teeth, toileting, grooming, dressing,  etc.)  - Assess/evaluate cause of self-care deficits   - Assess range of motion  - Assess patient's mobility; develop plan if impaired  - Assess patient's need for assistive devices and provide as appropriate  - Encourage maximum independence but intervene and supervise when necessary  - Involve family in performance of ADLs  - Assess for home care needs following discharge   - Consider OT consult to assist with ADL evaluation and planning for discharge  - Provide patient education as appropriate  Outcome: Progressing  Goal: Maintains/Returns to pre admission functional level  Description: INTERVENTIONS:  - Perform AM-PAC 6 Click Basic Mobility/ Daily Activity assessment daily.  - Set and communicate daily mobility goal to care team and patient/family/caregiver.   - Collaborate with rehabilitation services on mobility goals if consulted  - Perform Range of Motion 2 times a day.  - Reposition patient every 2 hours.  - Dangle patient 2 times a day  - Stand patient 2 times a day  - Ambulate patient 2 times a day  - Out of bed to chair 2 times a day   - Out of bed for meals 2 times a day  - Out of bed for toileting  - Record patient progress and toleration of activity level   Outcome: Progressing     Problem: DISCHARGE PLANNING  Goal: Discharge to home or other facility with appropriate resources  Description: INTERVENTIONS:  - Identify barriers to discharge w/patient and caregiver  - Arrange for needed discharge resources and transportation as appropriate  - Identify discharge learning needs (meds, wound care, etc.)  - Arrange for interpretive services to assist at discharge as needed  - Refer to Case Management Department for coordinating discharge planning if the patient needs post-hospital services based on physician/advanced practitioner order or complex needs related to functional status, cognitive ability, or social support system  Outcome: Progressing     Problem: Knowledge Deficit  Goal:  Patient/family/caregiver demonstrates understanding of disease process, treatment plan, medications, and discharge instructions  Description: Complete learning assessment and assess knowledge base.  Interventions:  - Provide teaching at level of understanding  - Provide teaching via preferred learning methods  Outcome: Progressing     Problem: GENITOURINARY - ADULT  Goal: Maintains or returns to baseline urinary function  Description: INTERVENTIONS:  - Assess urinary function  - Encourage oral fluids to ensure adequate hydration if ordered  - Administer IV fluids as ordered to ensure adequate hydration  - Administer ordered medications as needed  - Offer frequent toileting  - Follow urinary retention protocol if ordered  Outcome: Progressing  Goal: Absence of urinary retention  Description: INTERVENTIONS:  - Assess patient’s ability to void and empty bladder  - Monitor I/O  - Bladder scan as needed  - Discuss with physician/AP medications to alleviate retention as needed  - Discuss catheterization for long term situations as appropriate  Outcome: Progressing     Problem: METABOLIC, FLUID AND ELECTROLYTES - ADULT  Goal: Electrolytes maintained within normal limits  Description: INTERVENTIONS:  - Monitor labs and assess patient for signs and symptoms of electrolyte imbalances  - Administer electrolyte replacement as ordered  - Monitor response to electrolyte replacements, including repeat lab results as appropriate  - Instruct patient on fluid and nutrition as appropriate  Outcome: Progressing  Goal: Fluid balance maintained  Description: INTERVENTIONS:  - Monitor labs   - Monitor I/O and WT  - Instruct patient on fluid and nutrition as appropriate  - Assess for signs & symptoms of volume excess or deficit  Outcome: Progressing  Goal: Glucose maintained within target range  Description: INTERVENTIONS:  - Monitor Blood Glucose as ordered  - Assess for signs and symptoms of hyperglycemia and hypoglycemia  - Administer  ordered medications to maintain glucose within target range  - Assess nutritional intake and initiate nutrition service referral as needed  Outcome: Progressing     Problem: MUSCULOSKELETAL - ADULT  Goal: Maintain or return mobility to safest level of function  Description: INTERVENTIONS:  - Assess patient's ability to carry out ADLs; assess patient's baseline for ADL function and identify physical deficits which impact ability to perform ADLs (bathing, care of mouth/teeth, toileting, grooming, dressing, etc.)  - Assess/evaluate cause of self-care deficits   - Assess range of motion  - Assess patient's mobility  - Assess patient's need for assistive devices and provide as appropriate  - Encourage maximum independence but intervene and supervise when necessary  - Involve family in performance of ADLs  - Assess for home care needs following discharge   - Consider OT consult to assist with ADL evaluation and planning for discharge  - Provide patient education as appropriate  Outcome: Progressing     Problem: Nutrition/Hydration-ADULT  Goal: Nutrient/Hydration intake appropriate for improving, restoring or maintaining nutritional needs  Description: Monitor and assess patient's nutrition/hydration status for malnutrition. Collaborate with interdisciplinary team and initiate plan and interventions as ordered.  Monitor patient's weight and dietary intake as ordered or per policy. Utilize nutrition screening tool and intervene as necessary. Determine patient's food preferences and provide high-protein, high-caloric foods as appropriate.     INTERVENTIONS:  - Monitor oral intake, urinary output, labs, and treatment plans  - Assess nutrition and hydration status and recommend course of action  - Evaluate amount of meals eaten  - Assist patient with eating if necessary   - Allow adequate time for meals  - Recommend/ encourage appropriate diets, oral nutritional supplements, and vitamin/mineral supplements  - Order, calculate,  and assess calorie counts as needed  - Recommend, monitor, and adjust tube feedings and TPN/PPN based on assessed needs  - Assess need for intravenous fluids  - Provide specific nutrition/hydration education as appropriate  - Include patient/family/caregiver in decisions related to nutrition  Outcome: Progressing

## 2024-07-20 NOTE — PLAN OF CARE
Problem: Potential for Falls  Goal: Patient will remain free of falls  Description: INTERVENTIONS:  - Educate patient/family on patient safety including physical limitations  - Instruct patient to call for assistance with activity   - Consult OT/PT to assist with strengthening/mobility   - Keep Call bell within reach  - Keep bed low and locked with side rails adjusted as appropriate  - Keep care items and personal belongings within reach  - Initiate and maintain comfort rounds  - Make Fall Risk Sign visible to staff  - Offer Toileting every 2 Hours, in advance of need  - Initiate/Maintain bed alarm  - Obtain necessary fall risk management equipment: walker  - Apply yellow socks and bracelet for high fall risk patients  - Consider moving patient to room near nurses station  Outcome: Progressing     Problem: Prexisting or High Potential for Compromised Skin Integrity  Goal: Skin integrity is maintained or improved  Description: INTERVENTIONS:  - Identify patients at risk for skin breakdown  - Assess and monitor skin integrity  - Assess and monitor nutrition and hydration status  - Monitor labs   - Assess for incontinence   - Turn and reposition patient  - Assist with mobility/ambulation  - Relieve pressure over bony prominences  - Avoid friction and shearing  - Provide appropriate hygiene as needed including keeping skin clean and dry  - Evaluate need for skin moisturizer/barrier cream  - Collaborate with interdisciplinary team   - Patient/family teaching  - Consider wound care consult   Outcome: Progressing     Problem: PAIN - ADULT  Goal: Verbalizes/displays adequate comfort level or baseline comfort level  Description: Interventions:  - Encourage patient to monitor pain and request assistance  - Assess pain using appropriate pain scale  - Administer analgesics based on type and severity of pain and evaluate response  - Implement non-pharmacological measures as appropriate and evaluate response  - Consider  cultural and social influences on pain and pain management  - Notify physician/advanced practitioner if interventions unsuccessful or patient reports new pain  Outcome: Progressing     Problem: INFECTION - ADULT  Goal: Absence or prevention of progression during hospitalization  Description: INTERVENTIONS:  - Assess and monitor for signs and symptoms of infection  - Monitor lab/diagnostic results  - Monitor all insertion sites, i.e. indwelling lines, tubes, and drains  - Monitor endotracheal if appropriate and nasal secretions for changes in amount and color  - [place] appropriate cooling/warming therapies per order  - Administer medications as ordered  - Instruct and encourage patient and family to use good hand hygiene technique  - Identify and instruct in appropriate isolation precautions for identified infection/condition  Outcome: Progressing     Problem: SAFETY ADULT  Goal: Patient will remain free of falls  Description: INTERVENTIONS:  - Educate patient/family on patient safety including physical limitations  - Instruct patient to call for assistance with activity   - Consult OT/PT to assist with strengthening/mobility   - Keep Call bell within reach  - Keep bed low and locked with side rails adjusted as appropriate  - Keep care items and personal belongings within reach  - Initiate and maintain comfort rounds  - Make Fall Risk Sign visible to staff  - Offer Toileting every 2 Hours, in advance of need  - Initiate/Maintain bed alarm  - Obtain necessary fall risk management equipment: walker  - Apply yellow socks and bracelet for high fall risk patients  - Consider moving patient to room near nurses station  Outcome: Progressing  Goal: Maintain or return to baseline ADL function  Description: INTERVENTIONS:  -  Assess patient's ability to carry out ADLs; assess patient's baseline for ADL function and identify physical deficits which impact ability to perform ADLs (bathing, care of mouth/teeth, toileting,  grooming, dressing, etc.)  - Assess/evaluate cause of self-care deficits   - Assess range of motion  - Assess patient's mobility; develop plan if impaired  - Assess patient's need for assistive devices and provide as appropriate  - Encourage maximum independence but intervene and supervise when necessary  - Involve family in performance of ADLs  - Assess for home care needs following discharge   - Consider OT consult to assist with ADL evaluation and planning for discharge  - Provide patient education as appropriate  Outcome: Progressing  Goal: Maintains/Returns to pre admission functional level  Description: INTERVENTIONS:  - Perform AM-PAC 6 Click Basic Mobility/ Daily Activity assessment daily.  - Set and communicate daily mobility goal to care team and patient/family/caregiver.   - Collaborate with rehabilitation services on mobility goals if consulted  - Perform Range of Motion 3 times a day.  - Reposition patient every 2 hours.  - Dangle patient 3 times a day  - Stand patient 3 times a day  - Ambulate patient 3 times a day  - Out of bed to chair 3 times a day   - Out of bed for meals 3 times a day  - Out of bed for toileting  - Record patient progress and toleration of activity level   Outcome: Progressing     Problem: DISCHARGE PLANNING  Goal: Discharge to home or other facility with appropriate resources  Description: INTERVENTIONS:  - Identify barriers to discharge w/patient and caregiver  - Arrange for needed discharge resources and transportation as appropriate  - Identify discharge learning needs (meds, wound care, etc.)  - Arrange for interpretive services to assist at discharge as needed  - Refer to Case Management Department for coordinating discharge planning if the patient needs post-hospital services based on physician/advanced practitioner order or complex needs related to functional status, cognitive ability, or social support system  Outcome: Progressing     Problem: Knowledge Deficit  Goal:  Patient/family/caregiver demonstrates understanding of disease process, treatment plan, medications, and discharge instructions  Description: Complete learning assessment and assess knowledge base.  Interventions:  - Provide teaching at level of understanding  - Provide teaching via preferred learning methods  Outcome: Progressing     Problem: GENITOURINARY - ADULT  Goal: Maintains or returns to baseline urinary function  Description: INTERVENTIONS:  - Assess urinary function  - Encourage oral fluids to ensure adequate hydration if ordered  - Administer IV fluids as ordered to ensure adequate hydration  - Administer ordered medications as needed  - Offer frequent toileting  - Follow urinary retention protocol if ordered  Outcome: Progressing  Goal: Absence of urinary retention  Description: INTERVENTIONS:  - Assess patient’s ability to void and empty bladder  - Monitor I/O  - Bladder scan as needed  - Discuss with physician/AP medications to alleviate retention as needed  - Discuss catheterization for long term situations as appropriate  Outcome: Progressing     Problem: METABOLIC, FLUID AND ELECTROLYTES - ADULT  Goal: Electrolytes maintained within normal limits  Description: INTERVENTIONS:  - Monitor labs and assess patient for signs and symptoms of electrolyte imbalances  - Administer electrolyte replacement as ordered  - Monitor response to electrolyte replacements, including repeat lab results as appropriate  - Instruct patient on fluid and nutrition as appropriate  Outcome: Progressing  Goal: Fluid balance maintained  Description: INTERVENTIONS:  - Monitor labs   - Monitor I/O and WT  - Instruct patient on fluid and nutrition as appropriate  - Assess for signs & symptoms of volume excess or deficit  Outcome: Progressing  Goal: Glucose maintained within target range  Description: INTERVENTIONS:  - Monitor Blood Glucose as ordered  - Assess for signs and symptoms of hyperglycemia and hypoglycemia  - Administer  ordered medications to maintain glucose within target range  - Assess nutritional intake and initiate nutrition service referral as needed  Outcome: Progressing     Problem: MUSCULOSKELETAL - ADULT  Goal: Maintain or return mobility to safest level of function  Description: INTERVENTIONS:  - Assess patient's ability to carry out ADLs; assess patient's baseline for ADL function and identify physical deficits which impact ability to perform ADLs (bathing, care of mouth/teeth, toileting, grooming, dressing, etc.)  - Assess/evaluate cause of self-care deficits   - Assess range of motion  - Assess patient's mobility  - Assess patient's need for assistive devices and provide as appropriate  - Encourage maximum independence but intervene and supervise when necessary  - Involve family in performance of ADLs  - Assess for home care needs following discharge   - Consider OT consult to assist with ADL evaluation and planning for discharge  - Provide patient education as appropriate  Outcome: Progressing     Problem: Nutrition/Hydration-ADULT  Goal: Nutrient/Hydration intake appropriate for improving, restoring or maintaining nutritional needs  Description: Monitor and assess patient's nutrition/hydration status for malnutrition. Collaborate with interdisciplinary team and initiate plan and interventions as ordered.  Monitor patient's weight and dietary intake as ordered or per policy. Utilize nutrition screening tool and intervene as necessary. Determine patient's food preferences and provide high-protein, high-caloric foods as appropriate.     INTERVENTIONS:  - Monitor oral intake, urinary output, labs, and treatment plans  - Assess nutrition and hydration status and recommend course of action  - Evaluate amount of meals eaten  - Assist patient with eating if necessary   - Allow adequate time for meals  - Recommend/ encourage appropriate diets, oral nutritional supplements, and vitamin/mineral supplements  - Order, calculate,  and assess calorie counts as needed  - Recommend, monitor, and adjust tube feedings and TPN/PPN based on assessed needs  - Assess need for intravenous fluids  - Provide specific nutrition/hydration education as appropriate  - Include patient/family/caregiver in decisions related to nutrition  Outcome: Progressing

## 2024-07-20 NOTE — NURSING NOTE
Pt sleeping on/off during shift, impulsive, however cooperative and redirectable. 1:1 at bedside for safety. Brief drops in spo2 into 70s 80s, rebounds quickly. Repositioned q2 hours for comfort.

## 2024-07-21 PROCEDURE — 99232 SBSQ HOSP IP/OBS MODERATE 35: CPT | Performed by: HOSPITALIST

## 2024-07-21 RX ADMIN — LORAZEPAM 0.5 MG: 2 INJECTION INTRAMUSCULAR; INTRAVENOUS at 21:26

## 2024-07-21 NOTE — QUICK NOTE
GI BRIEF NOTE:    Patient is level 4 comfort care.  GI will sign off.  Please contact the GI provider on-call with any questions or concerns.    Lex Mccain D.O.  [facility]  Division of Gastroenterology & Hepatology  Available on Tiger[employer]  [email or web address]

## 2024-07-21 NOTE — ASSESSMENT & PLAN NOTE
VBS 7/16 - see aspiration for full plan   7/18 Patient failed second video swallow. Had at length goals of care discussion with family and decision made to transition to comfort care/hospice  7/19 Accepted to hospice at home versus SNF. Family chose the latter. Case management is following and are aware.  Patient is being transitioned to a SNF with hospice

## 2024-07-21 NOTE — ASSESSMENT & PLAN NOTE
CT head with no acute changes consistent with this, chronic vascular changes  Sertraline has been discontinued in view of the patient being transitioned to hospice

## 2024-07-21 NOTE — ASSESSMENT & PLAN NOTE
"Presenting with increased confusion, anxiety and balance issues  Initial chest x-ray was concerning for right lower lobe pneumonia. Concern for aspiration   COVID/flu/rsv negative  Urinary antigens negative   Blood cultures x2 no growth after 4 days  Patient was initially put on ceftriaxone, and Flagyl - switched to Augmentin 7/15 D2, with plan to continue 7 day course  Augmentin since discontinued back on Rocephin due to n.p.o.  Appreciate GI input-family refusing peg  Appreciate input of speech therapy who are following  Video barium swallow 7/16: patient aspirated before, during and after swallow  Patient was made n.p.o.  7/17 Continued goals of care discussion-spoke with patient's daughter Allegra at length.  She states that they are not ready for hospice.  Declining feeding tube at this time.  They feel that food is the 1 thing that the patient enjoys and he feels that we are \"starving him\" or not feeding him.  He has been requesting food from the family.  They declined feeding tube feel he is high risk for pulling it out and could cause \"internal bleeding\" if he pulled tube out.  Daughter requests speech therapy reevaluate patient as he has waxing and waning mentation.  Discussed with speech therapy will do repeat video swallow today.  Daughter understands that patient is high risk for aspiration and understands the complications of same.  7/18 repeat video swallow failed-at length discussion with family regarding GOC. Family decided on hospice-eval placed  7/19 Accepted to hospice at home or SNF. Daughter states will be at SNF. Case management following. Referral made to [facility]. Daughter also going to see [facility] living today and family will make decision  Update on 7/21/2024  Patient appears comfortable  Patient continues to eat and drink, luckily no evidence of hypoxia at this time   Hopeful discharge planning for Monday, 7/22/2024 to the [facility] under the umbrella " of hospice  If the patient is to deteriorate here in house, no further inpatient testing, treatment, and workup will be implemented, patient will be treated with comfort care based medications.  Patient's family have verbalized the understanding of the risk of continuing to feed the patient in the setting of having multiple video barium swallow evaluations that have concluded that the patient is aspirating extensively.  They understand that sudden respiratory distress can occur, and that the patient can potentially pass away, they understand the risk and since the patient is being transitioned to hospice, they would like to continue in this way.

## 2024-07-21 NOTE — PLAN OF CARE
Problem: Potential for Falls  Goal: Patient will remain free of falls  Description: INTERVENTIONS:  - Educate patient/family on patient safety including physical limitations  - Instruct patient to call for assistance with activity   - Consult OT/PT to assist with strengthening/mobility   - Keep Call bell within reach  - Keep bed low and locked with side rails adjusted as appropriate  - Keep care items and personal belongings within reach  - Initiate and maintain comfort rounds  - Make Fall Risk Sign visible to staff  - Offer Toileting every 2 Hours, in advance of need  - Initiate/Maintain bed alarm  - Obtain necessary fall risk management equipment: alarms, socks, walker  - Apply yellow socks and bracelet for high fall risk patients  - Consider moving patient to room near nurses station  Outcome: Progressing     Problem: Prexisting or High Potential for Compromised Skin Integrity  Goal: Skin integrity is maintained or improved  Description: INTERVENTIONS:  - Identify patients at risk for skin breakdown  - Assess and monitor skin integrity  - Assess and monitor nutrition and hydration status  - Monitor labs   - Assess for incontinence   - Turn and reposition patient  - Assist with mobility/ambulation  - Relieve pressure over bony prominences  - Avoid friction and shearing  - Provide appropriate hygiene as needed including keeping skin clean and dry  - Evaluate need for skin moisturizer/barrier cream  - Collaborate with interdisciplinary team   - Patient/family teaching  - Consider wound care consult   Outcome: Progressing     Problem: PAIN - ADULT  Goal: Verbalizes/displays adequate comfort level or baseline comfort level  Description: Interventions:  - Encourage patient to monitor pain and request assistance  - Assess pain using appropriate pain scale  - Administer analgesics based on type and severity of pain and evaluate response  - Implement non-pharmacological measures as appropriate and evaluate response  -  Consider cultural and social influences on pain and pain management  - Notify physician/advanced practitioner if interventions unsuccessful or patient reports new pain  Outcome: Progressing     Problem: INFECTION - ADULT  Goal: Absence or prevention of progression during hospitalization  Description: INTERVENTIONS:  - Assess and monitor for signs and symptoms of infection  - Monitor lab/diagnostic results  - Monitor all insertion sites, i.e. indwelling lines, tubes, and drains  - Monitor endotracheal if appropriate and nasal secretions for changes in amount and color  - [place] appropriate cooling/warming therapies per order  - Administer medications as ordered  - Instruct and encourage patient and family to use good hand hygiene technique  - Identify and instruct in appropriate isolation precautions for identified infection/condition  Outcome: Progressing     Problem: SAFETY ADULT  Goal: Patient will remain free of falls  Description: INTERVENTIONS:  - Educate patient/family on patient safety including physical limitations  - Instruct patient to call for assistance with activity   - Consult OT/PT to assist with strengthening/mobility   - Keep Call bell within reach  - Keep bed low and locked with side rails adjusted as appropriate  - Keep care items and personal belongings within reach  - Initiate and maintain comfort rounds  - Make Fall Risk Sign visible to staff  - Offer Toileting every 2 Hours, in advance of need  - Initiate/Maintain bed alarm  - Obtain necessary fall risk management equipment: alarms, socks, walker  - Apply yellow socks and bracelet for high fall risk patients  - Consider moving patient to room near nurses station  Outcome: Progressing  Goal: Maintain or return to baseline ADL function  Description: INTERVENTIONS:  -  Assess patient's ability to carry out ADLs; assess patient's baseline for ADL function and identify physical deficits which impact ability to perform ADLs (bathing, care of  mouth/teeth, toileting, grooming, dressing, etc.)  - Assess/evaluate cause of self-care deficits   - Assess range of motion  - Assess patient's mobility; develop plan if impaired  - Assess patient's need for assistive devices and provide as appropriate  - Encourage maximum independence but intervene and supervise when necessary  - Involve family in performance of ADLs  - Assess for home care needs following discharge   - Consider OT consult to assist with ADL evaluation and planning for discharge  - Provide patient education as appropriate  Outcome: Progressing  Goal: Maintains/Returns to pre admission functional level  Description: INTERVENTIONS:  - Perform AM-PAC 6 Click Basic Mobility/ Daily Activity assessment daily.  - Set and communicate daily mobility goal to care team and patient/family/caregiver.   - Collaborate with rehabilitation services on mobility goals if consulted  - Perform Range of Motion 3 times a day.  - Reposition patient every 2 hours.  - Dangle patient 3 times a day  - Stand patient 3 times a day  - Ambulate patient 3 times a day  - Out of bed to chair 3 times a day   - Out of bed for meals 3 times a day  - Out of bed for toileting  - Record patient progress and toleration of activity level   Outcome: Progressing     Problem: DISCHARGE PLANNING  Goal: Discharge to home or other facility with appropriate resources  Description: INTERVENTIONS:  - Identify barriers to discharge w/patient and caregiver  - Arrange for needed discharge resources and transportation as appropriate  - Identify discharge learning needs (meds, wound care, etc.)  - Arrange for interpretive services to assist at discharge as needed  - Refer to Case Management Department for coordinating discharge planning if the patient needs post-hospital services based on physician/advanced practitioner order or complex needs related to functional status, cognitive ability, or social support system  Outcome: Progressing     Problem:  Knowledge Deficit  Goal: Patient/family/caregiver demonstrates understanding of disease process, treatment plan, medications, and discharge instructions  Description: Complete learning assessment and assess knowledge base.  Interventions:  - Provide teaching at level of understanding  - Provide teaching via preferred learning methods  Outcome: Progressing     Problem: GENITOURINARY - ADULT  Goal: Maintains or returns to baseline urinary function  Description: INTERVENTIONS:  - Assess urinary function  - Encourage oral fluids to ensure adequate hydration if ordered  - Administer IV fluids as ordered to ensure adequate hydration  - Administer ordered medications as needed  - Offer frequent toileting  - Follow urinary retention protocol if ordered  Outcome: Progressing  Goal: Absence of urinary retention  Description: INTERVENTIONS:  - Assess patient’s ability to void and empty bladder  - Monitor I/O  - Bladder scan as needed  - Discuss with physician/AP medications to alleviate retention as needed  - Discuss catheterization for long term situations as appropriate  Outcome: Progressing     Problem: METABOLIC, FLUID AND ELECTROLYTES - ADULT  Goal: Electrolytes maintained within normal limits  Description: INTERVENTIONS:  - Monitor labs and assess patient for signs and symptoms of electrolyte imbalances  - Administer electrolyte replacement as ordered  - Monitor response to electrolyte replacements, including repeat lab results as appropriate  - Instruct patient on fluid and nutrition as appropriate  Outcome: Progressing  Goal: Fluid balance maintained  Description: INTERVENTIONS:  - Monitor labs   - Monitor I/O and WT  - Instruct patient on fluid and nutrition as appropriate  - Assess for signs & symptoms of volume excess or deficit  Outcome: Progressing  Goal: Glucose maintained within target range  Description: INTERVENTIONS:  - Monitor Blood Glucose as ordered  - Assess for signs and symptoms of hyperglycemia and  hypoglycemia  - Administer ordered medications to maintain glucose within target range  - Assess nutritional intake and initiate nutrition service referral as needed  Outcome: Progressing     Problem: MUSCULOSKELETAL - ADULT  Goal: Maintain or return mobility to safest level of function  Description: INTERVENTIONS:  - Assess patient's ability to carry out ADLs; assess patient's baseline for ADL function and identify physical deficits which impact ability to perform ADLs (bathing, care of mouth/teeth, toileting, grooming, dressing, etc.)  - Assess/evaluate cause of self-care deficits   - Assess range of motion  - Assess patient's mobility  - Assess patient's need for assistive devices and provide as appropriate  - Encourage maximum independence but intervene and supervise when necessary  - Involve family in performance of ADLs  - Assess for home care needs following discharge   - Consider OT consult to assist with ADL evaluation and planning for discharge  - Provide patient education as appropriate  Outcome: Progressing     Problem: Nutrition/Hydration-ADULT  Goal: Nutrient/Hydration intake appropriate for improving, restoring or maintaining nutritional needs  Description: Monitor and assess patient's nutrition/hydration status for malnutrition. Collaborate with interdisciplinary team and initiate plan and interventions as ordered.  Monitor patient's weight and dietary intake as ordered or per policy. Utilize nutrition screening tool and intervene as necessary. Determine patient's food preferences and provide high-protein, high-caloric foods as appropriate.     INTERVENTIONS:  - Monitor oral intake, urinary output, labs, and treatment plans  - Assess nutrition and hydration status and recommend course of action  - Evaluate amount of meals eaten  - Assist patient with eating if necessary   - Allow adequate time for meals  - Recommend/ encourage appropriate diets, oral nutritional supplements, and vitamin/mineral  supplements  - Order, calculate, and assess calorie counts as needed  - Recommend, monitor, and adjust tube feedings and TPN/PPN based on assessed needs  - Assess need for intravenous fluids  - Provide specific nutrition/hydration education as appropriate  - Include patient/family/caregiver in decisions related to nutrition  Outcome: Progressing

## 2024-07-21 NOTE — PROGRESS NOTES
"[facility]  Progress Note  Name: Thomas Chase I  MRN: [id number]  Unit/Bed#: -01 I Date of Admission: 7/13/2024   Date of Service: 7/21/2024 I Hospital Day: 7    Assessment & Plan   * Aspiration pneumonia (HCC)  Assessment & Plan  Presenting with increased confusion, anxiety and balance issues  Initial chest x-ray was concerning for right lower lobe pneumonia. Concern for aspiration   COVID/flu/rsv negative  Urinary antigens negative   Blood cultures x2 no growth after 4 days  Patient was initially put on ceftriaxone, and Flagyl - switched to Augmentin 7/15 D2, with plan to continue 7 day course  Augmentin since discontinued back on Rocephin due to n.p.o.  Appreciate GI input-family refusing peg  Appreciate input of speech therapy who are following  Video barium swallow 7/16: patient aspirated before, during and after swallow  Patient was made n.p.o.  7/17 Continued goals of care discussion-spoke with patient's daughter Allegra at length.  She states that they are not ready for hospice.  Declining feeding tube at this time.  They feel that food is the 1 thing that the patient enjoys and he feels that we are \"starving him\" or not feeding him.  He has been requesting food from the family.  They declined feeding tube feel he is high risk for pulling it out and could cause \"internal bleeding\" if he pulled tube out.  Daughter requests speech therapy reevaluate patient as he has waxing and waning mentation.  Discussed with speech therapy will do repeat video swallow today.  Daughter understands that patient is high risk for aspiration and understands the complications of same.  7/18 repeat video swallow failed-at length discussion with family regarding GOC. Family decided on hospice-eval placed  7/19 Accepted to hospice at home or SNF. Daughter states will be at SNF. Case management following. Referral made to [place]. Daughter also going to see [facility] " today and family will make decision  Update on 7/21/2024  Patient appears comfortable  Patient continues to eat and drink, luckily no evidence of hypoxia at this time   Hopeful discharge planning for Monday, 7/22/2024 to the [facility] under the umbrella of hospice  If the patient is to deteriorate here in house, no further inpatient testing, treatment, and workup will be implemented, patient will be treated with comfort care based medications.  Patient's family have verbalized the understanding of the risk of continuing to feed the patient in the setting of having multiple video barium swallow evaluations that have concluded that the patient is aspirating extensively.  They understand that sudden respiratory distress can occur, and that the patient can potentially pass away, they understand the risk and since the patient is being transitioned to hospice, they would like to continue in this way.      Dysphagia  Assessment & Plan  VBS 7/16 - see aspiration for full plan   7/18 Patient failed second video swallow. Had at length goals of care discussion with family and decision made to transition to comfort care/hospice  7/19 Accepted to hospice at home versus SNF. Family chose the latter. Case management is following and are aware.  Patient is being transitioned to a SNF with hospice    Acute metabolic encephalopathy  Assessment & Plan  POA  Likely secondary to infectious etiology and PMH dementia   Initial CT head-no acute pathology  Continue close monitoring  Mentation now baseline      UTI (urinary tract infection)  Assessment & Plan  Patient was recently diagnosed with a Proteus mirabilis UTI on 7/5/2024  Patient was taking Bactrim at home  Started on ceftriaxone, and transition to Flagyl to cover for aspiration and UTI.  After video barium swallow patient was made NPO.  Augmentin discontinued and ceftriaxone restarted - had 6 days of abt-dc 7/18 when transitioned to comfort care  No further labs,  testing or treatments  .    Diastolic dysfunction  Assessment & Plan  Without exacerbation  Patient examines euvolemic  All medications have been discontinued in view of the patient being transitioned to hospice    Bilateral lower extremity weakness  Assessment & Plan  PT/OT recommending level II  Transitioning to hospice, CM following     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HCC)  Assessment & Plan  CT head with no acute changes consistent with this, chronic vascular changes  Sertraline has been discontinued in view of the patient being transitioned to hospice    H/O traumatic subdural hematoma  Assessment & Plan  No acute changes on imaging      Benign prostatic hyperplasia without lower urinary tract symptoms  Assessment & Plan  Tamsulosin has been discontinued in view of transition to hospice  Continue to monitor urinary output    Sensorineural hearing loss (SNHL), bilateral  Assessment & Plan  Patient is very hard of hearing      H/O orthostatic hypotension  Assessment & Plan  No more midodrine  Blood pressure has been well controlled               VTE Pharmacologic Prophylaxis: VTE Score: 5  no pharmacological VTE prophylaxis is needed in this patient being transitioned to hospice and who is now on comfort measures    Mobility:   Basic Mobility Inpatient Raw Score: 13  JH-HLM Goal: 4: Move to chair/commode  JH-HLM Achieved: 6: Walk 10 steps or more  JH-HLM Goal NOT achieved. Continue with multidisciplinary rounding and encourage appropriate mobility to improve upon JH-HLM goals.    Patient Centered Rounds: I performed bedside rounds with nursing staff today.   Discussions with Specialists or Other Care Team Provider: Case management    Education and Discussions with Family / Patient:  Family members have been brought up to par on a daily basis.     Total Time Spent on Date of Encounter in care of patient: 35 mins. This time was spent on one or more of the following: performing physical exam; counseling and coordination  of care; obtaining or reviewing history; documenting in the medical record; reviewing/ordering tests, medications or procedures; communicating with other healthcare professionals and discussing with patient's family/caregivers.    Current Length of Stay: 7 day(s)  Current Patient Status: Inpatient   Certification Statement: The patient will continue to require additional inpatient hospital stay due to the need for transition to hospice  Discharge Plan: Anticipate discharge tomorrow to rehab facility.    Code Status: Level 4 - Comfort Care    Subjective:   Patient seen, resting in bed, appears very tired today, remains for the most part nonverbal with no purposeful interaction    Objective:     Vitals:   Temp (24hrs), Av.1 °F (36.7 °C), Min:98.1 °F (36.7 °C), Max:98.1 °F (36.7 °C)    Temp:  [98.1 °F (36.7 °C)] 98.1 °F (36.7 °C)  HR:  [83-84] 84  Resp:  [17] 17  BP: (102)/(67) 102/67  SpO2:  [95 %-98 %] 95 %  Body mass index is 19.49 kg/m².     Input and Output Summary (last 24 hours):     Intake/Output Summary (Last 24 hours) at 2024 1104  Last data filed at 2024 1038  Gross per 24 hour   Intake 1200 ml   Output 300 ml   Net 900 ml       Physical Exam:   Physical Exam  Vitals and nursing note reviewed.   Constitutional:       General: He is not in acute distress.     Appearance: Normal appearance. He is not ill-appearing.   HENT:      Head: Normocephalic and atraumatic.      Nose: Nose normal.   Eyes:      Extraocular Movements: Extraocular movements intact.      Pupils: Pupils are equal, round, and reactive to light.   Cardiovascular:      Rate and Rhythm: Normal rate and regular rhythm.      Pulses: Normal pulses.      Heart sounds: Normal heart sounds. No murmur heard.     No friction rub. No gallop.   Pulmonary:      Effort: Pulmonary effort is normal.      Breath sounds: Normal breath sounds.   Abdominal:      General: There is no distension.      Palpations: Abdomen is soft. There is no mass.       Tenderness: There is no abdominal tenderness. There is no guarding or rebound.   Musculoskeletal:         General: No swelling or tenderness. Normal range of motion.      Cervical back: Normal range of motion and neck supple. No rigidity. No muscular tenderness.      Right lower leg: No edema.      Left lower leg: No edema.   Skin:     General: Skin is warm.      Capillary Refill: Capillary refill takes less than 2 seconds.      Findings: No erythema or rash.   Neurological:      General: No focal deficit present.      Mental Status: He is alert. Mental status is at baseline. He is disoriented.      Comments: Alert and awake, and for the most part is nonverbal, does not follow simple one-step commands   Psychiatric:         Mood and Affect: Mood normal.         Behavior: Behavior normal.          Additional Data:     Labs:  Results from last 7 days   Lab Units 07/17/24  0549 07/16/24  0524   WBC Thousand/uL 7.35 6.44   HEMOGLOBIN g/dL 12.8 12.0   HEMATOCRIT % 39.6 37.2   PLATELETS Thousands/uL 489* 424*   SEGS PCT %  --  74   LYMPHO PCT %  --  16   MONO PCT %  --  8   EOS PCT %  --  1     Results from last 7 days   Lab Units 07/17/24  0549   SODIUM mmol/L 137   POTASSIUM mmol/L 4.2   CHLORIDE mmol/L 103   CO2 mmol/L 28   BUN mg/dL 17   CREATININE mg/dL 0.62   ANION GAP mmol/L 6   CALCIUM mg/dL 9.1   GLUCOSE RANDOM mg/dL 91         Results from last 7 days   Lab Units 07/15/24  0737   POC GLUCOSE mg/dl 111               Lines/Drains:  Invasive Devices       Peripheral Intravenous Line  Duration             Peripheral IV 07/19/24 Distal;Left;Upper;Ventral (anterior) Arm 2 days                          Imaging: No pertinent imaging reviewed.    Recent Cultures (last 7 days):   Results from last 7 days   Lab Units 07/16/24  0413   LEGIONELLA URINARY ANTIGEN  Negative       Last 24 Hours Medication List:   Current Facility-Administered Medications   Medication Dose Route Frequency Provider Last Rate    acetaminophen  325  mg Rectal Q4H PRN Danitza Duarte PA-C      bisacodyl  10 mg Rectal Daily PRN Danitza Duarte PA-C      LORazepam  0.5 mg Intravenous Q4H PRN RONY Arias      morphine injection  2 mg Intravenous Q4H PRN RONY Arias          Today, Patient Was Seen By: Tameka Zamora MD    **Please Note: This note may have been constructed using a voice recognition system.**

## 2024-07-22 PROBLEM — Z51.5 COMFORT MEASURES ONLY STATUS: Status: ACTIVE | Noted: 2024-07-22

## 2024-07-22 PROCEDURE — 99232 SBSQ HOSP IP/OBS MODERATE 35: CPT | Performed by: INTERNAL MEDICINE

## 2024-07-22 RX ORDER — LORAZEPAM 2 MG/ML
0.5 INJECTION INTRAMUSCULAR EVERY 4 HOURS PRN
Start: 2024-07-22 | End: 2024-07-26

## 2024-07-22 RX ORDER — BISACODYL 10 MG
10 SUPPOSITORY, RECTAL RECTAL DAILY PRN
Qty: 12 SUPPOSITORY | Refills: 0 | Status: SHIPPED | OUTPATIENT
Start: 2024-07-22

## 2024-07-22 NOTE — DISCHARGE SUMMARY
[facility]  Discharge- Thomas Chase 1940, 83 y.o. male MRN: [id number]  Unit/Bed#: -Preet Encounter: [id number]  Primary Care Provider: RONY Coe   Date and time admitted to hospital: 7/13/2024  8:29 PM    H/O orthostatic hypotension  Assessment & Plan  No more midodrine  Blood pressure has been well controlled    UTI (urinary tract infection)  Assessment & Plan  Patient was recently diagnosed with a Proteus mirabilis UTI on 7/5/2024  Patient was taking Bactrim at home  Started on ceftriaxone, and transition to Flagyl to cover for aspiration and UTI.  After video barium swallow patient was made NPO.  Augmentin discontinued and ceftriaxone restarted - had 6 days of abt-dc 7/18 when transitioned to comfort care  No further labs, testing or treatments  .    Acute metabolic encephalopathy  Assessment & Plan  POA  Likely secondary to infectious etiology and PMH dementia   Initial CT head-no acute pathology  Continue close monitoring  Mentation now baseline      Benign prostatic hyperplasia without lower urinary tract symptoms  Assessment & Plan  Tamsulosin has been discontinued in view of transition to hospice  Continue to monitor urinary output    Bilateral lower extremity weakness  Assessment & Plan  PT/OT recommending level II  Transitioning to hospice, CM following     Dysphagia  Assessment & Plan  VBS 7/16 - see aspiration for full plan   7/18 Patient failed second video swallow. Had at length goals of care discussion with family and decision made to transition to comfort care/hospice  7/19 Accepted to hospice at home versus SNF. Family chose the latter. Case management is following and are aware.  Patient is being transitioned to a SNF with hospice    H/O traumatic subdural hematoma  Assessment & Plan  No acute changes on imaging      Sensorineural hearing loss (SNHL), bilateral  Assessment & Plan  Patient is very hard of hearing      Diastolic  "dysfunction  Assessment & Plan  Without exacerbation  Patient examines euvolemic  All medications have been discontinued in view of the patient being transitioned to hospice    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HCC)  Assessment & Plan  CT head with no acute changes consistent with this, chronic vascular changes  Sertraline has been discontinued in view of the patient being transitioned to hospice    * Aspiration pneumonia (HCC)  Assessment & Plan  Presenting with increased confusion, anxiety and balance issues  Initial chest x-ray was concerning for right lower lobe pneumonia. Concern for aspiration   COVID/flu/rsv negative  Urinary antigens negative   Blood cultures x2 no growth after 4 days  Patient was initially put on ceftriaxone, and Flagyl - switched to Augmentin 7/15 D2, with plan to continue 7 day course  Augmentin since discontinued back on Rocephin due to n.p.o.  Appreciate GI input-family refusing peg  Appreciate input of speech therapy who are following  Video barium swallow 7/16: patient aspirated before, during and after swallow  Patient was made n.p.o.  7/17 Continued goals of care discussion-spoke with patient's daughter Allegra at length.  She states that they are not ready for hospice.  Declining feeding tube at this time.  They feel that food is the 1 thing that the patient enjoys and he feels that we are \"starving him\" or not feeding him.  He has been requesting food from the family.  They declined feeding tube feel he is high risk for pulling it out and could cause \"internal bleeding\" if he pulled tube out.  Daughter requests speech therapy reevaluate patient as he has waxing and waning mentation.  Discussed with speech therapy will do repeat video swallow today.  Daughter understands that patient is high risk for aspiration and understands the complications of same.  7/18 repeat video swallow failed-at length discussion with family regarding GOC. Family decided on hospice-eval placed  7/19 Accepted " to hospice at home or SNF. Daughter states will be at SNF. Case management following. Referral made to [facility]. Daughter also going to see [facility] living today and family will make decision  Update on 7/21/2024  Patient appears comfortable  Patient continues to eat and drink, luckily no evidence of hypoxia at this time   Hopeful discharge planning for Monday, 7/22/2024 to the [facility] under the umbrella of hospice  If the patient is to deteriorate here in house, no further inpatient testing, treatment, and workup will be implemented, patient will be treated with comfort care based medications.  Patient's family have verbalized the understanding of the risk of continuing to feed the patient in the setting of having multiple video barium swallow evaluations that have concluded that the patient is aspirating extensively.  They understand that sudden respiratory distress can occur, and that the patient can potentially pass away, they understand the risk and since the patient is being transitioned to hospice, they would like to continue in this way.                Medical Problems       Resolved Problems  Date Reviewed: 7/22/2024   None         Admission Date:   Admission Orders (From admission, onward)       Ordered        07/14/24 0103  INPATIENT ADMISSION  Once                            Admitting Diagnosis: Confusion [R41.0]  Pneumonia [J18.9]  Hearing impaired person, bilateral [H91.93]    HPI: Thomas Chase is a 83 y.o. male with a PMH of vascular dementia, severe protein calorie malnutrition, stents, insomnia, peripheral vascular disease, prior history of aspiration of right lower lobe pneumonia, prior subdural hematoma: February 2024 s/p craniotomy 2 years ago, dysphagia, hearing impairment who presents with family, with a chief complaint two days  of generalized increase weakness, increased confusion with anxiety and balance issues.  Unfortunately patient is unable to  provide any information.   Patient's daughter and wife at the bedside states that the patient has been noticed more disoriented than at his baseline with unbalanced gait.  He appeared very weak to the point where 2 people could not hold him.  They noticed patient was coughing more than usual.  Family reports history of dysphagia. Patient also taking Bactrim for UTI.   Family reports having increasing challenges taking care of the patient at home who has 24-hour care concerned that they are unable to take care  and ask in  assistance in placement of the patient in a skilled nursing facility no recent falls.    Patient does not normally speak is unable to communicate effectively, communicates by writing on the board, is severely hearing impaired.  Chest x-ray showed right lower lobe pneumonia.  Labs unrevealing.  On my evaluation patient appears not in acute distress, hemodynamically stable.    Family denies any fevers, nausea, vomiting, diarrhea.    Procedures Performed:   Orders Placed This Encounter   Procedures    ED ECG Documentation Only       Summary of Hospital Course: The patient was transitioned to comfort measures only status.  Hospice liaison was consulted and arranged for inpatient hospice.  The patient has remained comfortable throughout his hospital course.  He was discharged on 7/22/2024 to inpatient hospice.    Significant Findings, Care, Treatment and Services Provided: Not applicable    Complications: Not applicable    Condition at Discharge: comfortable    Discharge instructions/Information to patient and family:   See after visit summary for information provided to patient and family.      Provisions for Follow-Up Care:  See after visit summary for information related to follow-up care and any pertinent home health orders.      PCP: RONY Coe    Disposition:  Inpatient hospice    Planned Readmission: No    Discharge Statement   I spent 75 minutes discharging the patient. This time  was spent on the day of discharge. I had direct contact with the patient on the day of discharge. Additional documentation is required if more than 30 minutes were spent on discharge.     Discharge Medications:  See after visit summary for reconciled discharge medications provided to patient and family.

## 2024-07-22 NOTE — PROGRESS NOTES
Pastoral Care Progress Note    2024  Patient: Thomas Chase : 1940  Admission Date & Time: 2024  MRN: [id number] [facility]: [id number]        CH initiated visit in setting of transition to hospice care, encountered pt lying in bed, wife present.  Pt was noncommunicative.   Wife states they are waiting for approval for admission to facility under hospice care; if not approved she does 'have a plan B' but is hopeful for this approval.   Wife and CH talked about life transitions, ageing, and the specialized care often needed as we get older, and that their children are supportive. CH assesses wife is well-grounded in her mayra and is accepting of what is happening in this phase of their lives. Wife states she is grateful for the care her  is receiving here. She was very appreciative of CH visit; CH invited her to contact pastoral care if and when desired.              24 1300   Clinical Encounter Type   Visited With Patient and family together

## 2024-07-22 NOTE — PROGRESS NOTES
"Replaced by [facility]  Progress Note  Name: Thomas Chase I  MRN: [id number]  Unit/Bed#: -01 I Date of Admission: 7/13/2024   Date of Service: 7/22/2024 I Hospital Day: 8    Assessment & Plan   * Aspiration pneumonia (HCC)  Assessment & Plan  Presenting with increased confusion, anxiety and balance issues  Initial chest x-ray was concerning for right lower lobe pneumonia. Concern for aspiration   COVID/flu/rsv negative  Urinary antigens negative   Blood cultures x2 no growth after 4 days  Patient was initially put on ceftriaxone, and Flagyl - switched to Augmentin 7/15 D2, with plan to continue 7 day course  Augmentin since discontinued back on Rocephin due to n.p.o.  Appreciate GI input-family refusing peg  Appreciate input of speech therapy who are following  Video barium swallow 7/16: patient aspirated before, during and after swallow  Patient was made n.p.o.  7/17 Continued goals of care discussion-spoke with patient's daughter Allegra at length.  She states that they are not ready for hospice.  Declining feeding tube at this time.  They feel that food is the 1 thing that the patient enjoys and he feels that we are \"starving him\" or not feeding him.  He has been requesting food from the family.  They declined feeding tube feel he is high risk for pulling it out and could cause \"internal bleeding\" if he pulled tube out.  Daughter requests speech therapy reevaluate patient as he has waxing and waning mentation.  Discussed with speech therapy will do repeat video swallow today.  Daughter understands that patient is high risk for aspiration and understands the complications of same.  7/18 repeat video swallow failed-at length discussion with family regarding GOC. Family decided on hospice-eval placed  7/19 Accepted to hospice at home or SNF. Daughter states will be at SNF. Case management following. Referral made to [facility]. Daughter also going to see [facility] " today and family will make decision  Update on 7/21/2024  Patient appears comfortable  Patient continues to eat and drink, luckily no evidence of hypoxia at this time   Hopeful discharge planning for Monday, 7/22/2024 to the [facility] under the umbrella of hospice  If the patient is to deteriorate here in house, no further inpatient testing, treatment, and workup will be implemented, patient will be treated with comfort care based medications.  Patient's family have verbalized the understanding of the risk of continuing to feed the patient in the setting of having multiple video barium swallow evaluations that have concluded that the patient is aspirating extensively.  They understand that sudden respiratory distress can occur, and that the patient can potentially pass away, they understand the risk and since the patient is being transitioned to hospice, they would like to continue in this way.      H/O orthostatic hypotension  Assessment & Plan  No more midodrine  Blood pressure has been well controlled    UTI (urinary tract infection)  Assessment & Plan  Patient was recently diagnosed with a Proteus mirabilis UTI on 7/5/2024  Patient was taking Bactrim at home  Started on ceftriaxone, and transition to Flagyl to cover for aspiration and UTI.  After video barium swallow patient was made NPO.  Augmentin discontinued and ceftriaxone restarted - had 6 days of abt-dc 7/18 when transitioned to comfort care  No further labs, testing or treatments  .    Acute metabolic encephalopathy  Assessment & Plan  POA  Likely secondary to infectious etiology and PMH dementia   Initial CT head-no acute pathology  Continue close monitoring  Mentation now baseline      Benign prostatic hyperplasia without lower urinary tract symptoms  Assessment & Plan  Tamsulosin has been discontinued in view of transition to hospice  Continue to monitor urinary output    Bilateral lower extremity weakness  Assessment & Plan  PT/OT  recommending level II  Transitioning to hospice, CM following     Dysphagia  Assessment & Plan  VBS 7/16 - see aspiration for full plan   7/18 Patient failed second video swallow. Had at length goals of care discussion with family and decision made to transition to comfort care/hospice  7/19 Accepted to hospice at home versus SNF. Family chose the latter. Case management is following and are aware.  Patient is being transitioned to a SNF with hospice    H/O traumatic subdural hematoma  Assessment & Plan  No acute changes on imaging      Sensorineural hearing loss (SNHL), bilateral  Assessment & Plan  Patient is very hard of hearing      Diastolic dysfunction  Assessment & Plan  Without exacerbation  Patient examines euvolemic  All medications have been discontinued in view of the patient being transitioned to hospice    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HCC)  Assessment & Plan  CT head with no acute changes consistent with this, chronic vascular changes  Sertraline has been discontinued in view of the patient being transitioned to hospice               VTE Pharmacologic Prophylaxis: VTE Score: 5  contraindicated 2/2 hospice    Mobility:   Basic Mobility Inpatient Raw Score: 15  JH-HLM Goal: 4: Move to chair/commode  JH-HLM Achieved: 7: Walk 25 feet or more  JH-HLM Goal achieved. Continue to encourage appropriate mobility.    Patient Centered Rounds: I performed bedside rounds with nursing staff today.   Discussions with Specialists or Other Care Team Provider: Case management    Education and Discussions with Family / Patient: Updated  (daughter) via phone.    Total Time Spent on Date of Encounter in care of patient: 35 mins. This time was spent on one or more of the following: performing physical exam; counseling and coordination of care; obtaining or reviewing history; documenting in the medical record; reviewing/ordering tests, medications or procedures; communicating with other healthcare professionals  and discussing with patient's family/caregivers.    Current Length of Stay: 8 day(s)  Current Patient Status: Inpatient   Certification Statement: The patient will continue to require additional inpatient hospital stay due to comfort measures only status  Discharge Plan: Anticipate discharge later today or tomorrow to inpatient hospice    Code Status: Level 4 - Comfort Care    Subjective:   Patient seen and examined at bedside.  No acute events overnight.  Patient resting comfortably in bed.  Pending placement to inpatient hospice facility.    Objective:     Vitals:   Temp (24hrs), Av.2 °F (37.3 °C), Min:99.2 °F (37.3 °C), Max:99.2 °F (37.3 °C)    Temp:  [99.2 °F (37.3 °C)] 99.2 °F (37.3 °C)  HR:  [74-87] 74  Resp:  [4-18] 4  BP: (118)/(71) 118/71  SpO2:  [95 %] 95 %  Body mass index is 19.49 kg/m².     Input and Output Summary (last 24 hours):     Intake/Output Summary (Last 24 hours) at 2024 0907  Last data filed at 2024 0700  Gross per 24 hour   Intake 660 ml   Output 425 ml   Net 235 ml       Physical Exam:   Physical Exam  Vitals and nursing note reviewed.   Constitutional:       General: He is not in acute distress.     Appearance: He is well-developed.   HENT:      Head: Normocephalic and atraumatic.   Eyes:      Conjunctiva/sclera: Conjunctivae normal.   Cardiovascular:      Rate and Rhythm: Normal rate and regular rhythm.      Heart sounds: No murmur heard.  Pulmonary:      Effort: Pulmonary effort is normal. No respiratory distress.      Breath sounds: Normal breath sounds.   Abdominal:      Palpations: Abdomen is soft.      Tenderness: There is no abdominal tenderness.   Musculoskeletal:         General: No swelling.      Cervical back: Neck supple.   Skin:     General: Skin is warm and dry.      Capillary Refill: Capillary refill takes less than 2 seconds.   Neurological:      Mental Status: He is alert.   Psychiatric:         Mood and Affect: Mood normal.          Additional Data:      Labs:  Results from last 7 days   Lab Units 07/17/24  0549 07/16/24  0524   WBC Thousand/uL 7.35 6.44   HEMOGLOBIN g/dL 12.8 12.0   HEMATOCRIT % 39.6 37.2   PLATELETS Thousands/uL 489* 424*   SEGS PCT %  --  74   LYMPHO PCT %  --  16   MONO PCT %  --  8   EOS PCT %  --  1     Results from last 7 days   Lab Units 07/17/24  0549   SODIUM mmol/L 137   POTASSIUM mmol/L 4.2   CHLORIDE mmol/L 103   CO2 mmol/L 28   BUN mg/dL 17   CREATININE mg/dL 0.62   ANION GAP mmol/L 6   CALCIUM mg/dL 9.1   GLUCOSE RANDOM mg/dL 91                       Lines/Drains:  Invasive Devices       Peripheral Intravenous Line  Duration             Peripheral IV 07/19/24 Distal;Left;Upper;Ventral (anterior) Arm 3 days                  Imaging: No pertinent imaging reviewed.    Recent Cultures (last 7 days):   Results from last 7 days   Lab Units 07/16/24  0413   LEGIONELLA URINARY ANTIGEN  Negative       Last 24 Hours Medication List:   Current Facility-Administered Medications   Medication Dose Route Frequency Provider Last Rate    acetaminophen  325 mg Rectal Q4H PRN Danitza Duarte PA-C      bisacodyl  10 mg Rectal Daily PRN Danitza Duarte PA-C      LORazepam  0.5 mg Intravenous Q4H PRN RONY Arias      morphine injection  2 mg Intravenous Q4H PRN RONY Arias          Today, Patient Was Seen By: Jose Gamez DO    **Please Note: This note may have been constructed using a voice recognition system.**     Risks/benefits discussed with patient or patient surrogate

## 2024-07-22 NOTE — PLAN OF CARE
Problem: Potential for Falls  Goal: Patient will remain free of falls  Description: INTERVENTIONS:  - Educate patient/family on patient safety including physical limitations  - Instruct patient to call for assistance with activity   - Consult OT/PT to assist with strengthening/mobility   - Keep Call bell within reach  - Keep bed low and locked with side rails adjusted as appropriate  - Keep care items and personal belongings within reach  - Initiate and maintain comfort rounds  - Make Fall Risk Sign visible to staff  - Offer Toileting every 2 Hours, in advance of need  - Initiate/Maintain bed alarm  - Obtain necessary fall risk management equipment: non slip socks  - Apply yellow socks and bracelet for high fall risk patients  - Consider moving patient to room near nurses station  Outcome: Progressing   Pt maintained on in person 1:1

## 2024-07-22 NOTE — PLAN OF CARE
Problem: Potential for Falls  Goal: Patient will remain free of falls  Description: INTERVENTIONS:  - Educate patient/family on patient safety including physical limitations  - Instruct patient to call for assistance with activity   - Consult OT/PT to assist with strengthening/mobility   - Keep Call bell within reach  - Keep bed low and locked with side rails adjusted as appropriate  - Keep care items and personal belongings within reach  - Initiate and maintain comfort rounds  - Make Fall Risk Sign visible to staff  - Offer Toileting every 2 Hours, in advance of need  - Initiate/Maintain bed alarm  - Obtain necessary fall risk management equipment:   - Apply yellow socks and bracelet for high fall risk patients  - Consider moving patient to room near nurses station  Outcome: Progressing     Problem: Prexisting or High Potential for Compromised Skin Integrity  Goal: Skin integrity is maintained or improved  Description: INTERVENTIONS:  - Identify patients at risk for skin breakdown  - Assess and monitor skin integrity  - Assess and monitor nutrition and hydration status  - Monitor labs   - Assess for incontinence   - Turn and reposition patient  - Assist with mobility/ambulation  - Relieve pressure over bony prominences  - Avoid friction and shearing  - Provide appropriate hygiene as needed including keeping skin clean and dry  - Evaluate need for skin moisturizer/barrier cream  - Collaborate with interdisciplinary team   - Patient/family teaching  - Consider wound care consult   Outcome: Progressing     Problem: PAIN - ADULT  Goal: Verbalizes/displays adequate comfort level or baseline comfort level  Description: Interventions:  - Encourage patient to monitor pain and request assistance  - Assess pain using appropriate pain scale  - Administer analgesics based on type and severity of pain and evaluate response  - Implement non-pharmacological measures as appropriate and evaluate response  - Consider cultural and  social influences on pain and pain management  - Notify physician/advanced practitioner if interventions unsuccessful or patient reports new pain  Outcome: Progressing     Problem: INFECTION - ADULT  Goal: Absence or prevention of progression during hospitalization  Description: INTERVENTIONS:  - Assess and monitor for signs and symptoms of infection  - Monitor lab/diagnostic results  - Monitor all insertion sites, i.e. indwelling lines, tubes, and drains  - Monitor endotracheal if appropriate and nasal secretions for changes in amount and color  - [place] appropriate cooling/warming therapies per order  - Administer medications as ordered  - Instruct and encourage patient and family to use good hand hygiene technique  - Identify and instruct in appropriate isolation precautions for identified infection/condition  Outcome: Progressing     Problem: SAFETY ADULT  Goal: Patient will remain free of falls  Description: INTERVENTIONS:  - Educate patient/family on patient safety including physical limitations  - Instruct patient to call for assistance with activity   - Consult OT/PT to assist with strengthening/mobility   - Keep Call bell within reach  - Keep bed low and locked with side rails adjusted as appropriate  - Keep care items and personal belongings within reach  - Initiate and maintain comfort rounds  - Make Fall Risk Sign visible to staff  - Offer Toileting every 2 Hours, in advance of need  - Initiate/Maintain bed alarm  - Obtain necessary fall risk management equipment:   - Apply yellow socks and bracelet for high fall risk patients  - Consider moving patient to room near nurses station  Outcome: Progressing  Goal: Maintain or return to baseline ADL function  Description: INTERVENTIONS:  -  Assess patient's ability to carry out ADLs; assess patient's baseline for ADL function and identify physical deficits which impact ability to perform ADLs (bathing, care of mouth/teeth, toileting, grooming, dressing,  etc.)  - Assess/evaluate cause of self-care deficits   - Assess range of motion  - Assess patient's mobility; develop plan if impaired  - Assess patient's need for assistive devices and provide as appropriate  - Encourage maximum independence but intervene and supervise when necessary  - Involve family in performance of ADLs  - Assess for home care needs following discharge   - Consider OT consult to assist with ADL evaluation and planning for discharge  - Provide patient education as appropriate  Outcome: Progressing  Goal: Maintains/Returns to pre admission functional level  Description: INTERVENTIONS:  - Perform AM-PAC 6 Click Basic Mobility/ Daily Activity assessment daily.  - Set and communicate daily mobility goal to care team and patient/family/caregiver.   - Collaborate with rehabilitation services on mobility goals if consulted  - Perform Range of Motion 2 times a day.  - Reposition patient every 2 hours.  - Dangle patient 2 times a day  - Stand patient 2 times a day  - Ambulate patient 2 times a day  - Out of bed to chair 2 times a day   - Out of bed for meals 2 times a day  - Out of bed for toileting  - Record patient progress and toleration of activity level   Outcome: Progressing     Problem: DISCHARGE PLANNING  Goal: Discharge to home or other facility with appropriate resources  Description: INTERVENTIONS:  - Identify barriers to discharge w/patient and caregiver  - Arrange for needed discharge resources and transportation as appropriate  - Identify discharge learning needs (meds, wound care, etc.)  - Arrange for interpretive services to assist at discharge as needed  - Refer to Case Management Department for coordinating discharge planning if the patient needs post-hospital services based on physician/advanced practitioner order or complex needs related to functional status, cognitive ability, or social support system  Outcome: Progressing     Problem: Knowledge Deficit  Goal:  Patient/family/caregiver demonstrates understanding of disease process, treatment plan, medications, and discharge instructions  Description: Complete learning assessment and assess knowledge base.  Interventions:  - Provide teaching at level of understanding  - Provide teaching via preferred learning methods  Outcome: Progressing     Problem: GENITOURINARY - ADULT  Goal: Maintains or returns to baseline urinary function  Description: INTERVENTIONS:  - Assess urinary function  - Encourage oral fluids to ensure adequate hydration if ordered  - Administer IV fluids as ordered to ensure adequate hydration  - Administer ordered medications as needed  - Offer frequent toileting  - Follow urinary retention protocol if ordered  Outcome: Progressing  Goal: Absence of urinary retention  Description: INTERVENTIONS:  - Assess patient’s ability to void and empty bladder  - Monitor I/O  - Bladder scan as needed  - Discuss with physician/AP medications to alleviate retention as needed  - Discuss catheterization for long term situations as appropriate  Outcome: Progressing     Problem: METABOLIC, FLUID AND ELECTROLYTES - ADULT  Goal: Electrolytes maintained within normal limits  Description: INTERVENTIONS:  - Monitor labs and assess patient for signs and symptoms of electrolyte imbalances  - Administer electrolyte replacement as ordered  - Monitor response to electrolyte replacements, including repeat lab results as appropriate  - Instruct patient on fluid and nutrition as appropriate  Outcome: Progressing  Goal: Fluid balance maintained  Description: INTERVENTIONS:  - Monitor labs   - Monitor I/O and WT  - Instruct patient on fluid and nutrition as appropriate  - Assess for signs & symptoms of volume excess or deficit  Outcome: Progressing  Goal: Glucose maintained within target range  Description: INTERVENTIONS:  - Monitor Blood Glucose as ordered  - Assess for signs and symptoms of hyperglycemia and hypoglycemia  - Administer  ordered medications to maintain glucose within target range  - Assess nutritional intake and initiate nutrition service referral as needed  Outcome: Progressing     Problem: MUSCULOSKELETAL - ADULT  Goal: Maintain or return mobility to safest level of function  Description: INTERVENTIONS:  - Assess patient's ability to carry out ADLs; assess patient's baseline for ADL function and identify physical deficits which impact ability to perform ADLs (bathing, care of mouth/teeth, toileting, grooming, dressing, etc.)  - Assess/evaluate cause of self-care deficits   - Assess range of motion  - Assess patient's mobility  - Assess patient's need for assistive devices and provide as appropriate  - Encourage maximum independence but intervene and supervise when necessary  - Involve family in performance of ADLs  - Assess for home care needs following discharge   - Consider OT consult to assist with ADL evaluation and planning for discharge  - Provide patient education as appropriate  Outcome: Progressing     Problem: Nutrition/Hydration-ADULT  Goal: Nutrient/Hydration intake appropriate for improving, restoring or maintaining nutritional needs  Description: Monitor and assess patient's nutrition/hydration status for malnutrition. Collaborate with interdisciplinary team and initiate plan and interventions as ordered.  Monitor patient's weight and dietary intake as ordered or per policy. Utilize nutrition screening tool and intervene as necessary. Determine patient's food preferences and provide high-protein, high-caloric foods as appropriate.     INTERVENTIONS:  - Monitor oral intake, urinary output, labs, and treatment plans  - Assess nutrition and hydration status and recommend course of action  - Evaluate amount of meals eaten  - Assist patient with eating if necessary   - Allow adequate time for meals  - Recommend/ encourage appropriate diets, oral nutritional supplements, and vitamin/mineral supplements  - Order, calculate,  and assess calorie counts as needed  - Recommend, monitor, and adjust tube feedings and TPN/PPN based on assessed needs  - Assess need for intravenous fluids  - Provide specific nutrition/hydration education as appropriate  - Include patient/family/caregiver in decisions related to nutrition  Outcome: Progressing

## 2024-07-22 NOTE — ASSESSMENT & PLAN NOTE
"Presenting with increased confusion, anxiety and balance issues  Initial chest x-ray was concerning for right lower lobe pneumonia. Concern for aspiration   COVID/flu/rsv negative  Urinary antigens negative   Blood cultures x2 no growth after 4 days  Patient was initially put on ceftriaxone, and Flagyl - switched to Augmentin 7/15 D2, with plan to continue 7 day course  Augmentin since discontinued back on Rocephin due to n.p.o.  Appreciate GI input-family refusing peg  Appreciate input of speech therapy who are following  Video barium swallow 7/16: patient aspirated before, during and after swallow  Patient was made n.p.o.  7/17 Continued goals of care discussion-spoke with patient's daughter Allegra at length.  She states that they are not ready for hospice.  Declining feeding tube at this time.  They feel that food is the 1 thing that the patient enjoys and he feels that we are \"starving him\" or not feeding him.  He has been requesting food from the family.  They declined feeding tube feel he is high risk for pulling it out and could cause \"internal bleeding\" if he pulled tube out.  Daughter requests speech therapy reevaluate patient as he has waxing and waning mentation.  Discussed with speech therapy will do repeat video swallow today.  Daughter understands that patient is high risk for aspiration and understands the complications of same.  7/18 repeat video swallow failed-at length discussion with family regarding GOC. Family decided on hospice-eval placed  7/19 Accepted to hospice at home or SNF. Daughter states will be at SNF. Case management following. Referral made to [facility]. Daughter also going to see [facility] living today and family will make decision  Update on 7/21/2024  Patient appears comfortable  Patient continues to eat and drink, luckily no evidence of hypoxia at this time   Hopeful discharge planning for Monday, 7/22/2024 to the [facility] under the umbrella " of hospice  If the patient is to deteriorate here in house, no further inpatient testing, treatment, and workup will be implemented, patient will be treated with comfort care based medications.  Patient's family have verbalized the understanding of the risk of continuing to feed the patient in the setting of having multiple video barium swallow evaluations that have concluded that the patient is aspirating extensively.  They understand that sudden respiratory distress can occur, and that the patient can potentially pass away, they understand the risk and since the patient is being transitioned to hospice, they would like to continue in this way.

## 2024-07-22 NOTE — CASE MANAGEMENT
Case Management Discharge Planning Note    Patient name Thomas Chase  Location /-01 MRN [id number]  : 1940 Date 2024       Current Admission Date: 2024  Current Admission Diagnosis:Comfort measures only status   Patient Active Problem List    Diagnosis Date Noted Date Diagnosed    Comfort measures only status 2024     Refusal of treatment 2024     H/O orthostatic hypotension 07/15/2024     Aspiration pneumonia (HCC) 2024     Acute metabolic encephalopathy 2024     UTI (urinary tract infection) 2024     Peripheral vascular disease, unspecified (HCC) 2024     Abnormal urinalysis 2024     Benign prostatic hyperplasia without lower urinary tract symptoms 2024     Insomnia 2024     Abnormal CT of the head 2024     Hematuria 2024     Headache 2024     Behavior safety risk 2024     Bilateral lower extremity weakness 2024     Abnormal CT scan, lumbar spine 02/15/2024     Severe protein-calorie malnutrition (HCC) 2024     Debility 2024     Pharyngeal myoclonus 2023     Dysphagia 2023     Macular degeneration, age related 2023     H/O traumatic subdural hematoma 2023     Sensorineural hearing loss (SNHL), bilateral 2022     Balance disorder 2022     Right bundle-branch block 2022     Low BP 2022     Diastolic dysfunction 2022     Constipation due to neurogenic bowel 2022     Urinary retention 2022     SDH (subdural hematoma) ([facility]) 2022     Primary osteoarthritis of left knee 2022     Hygroma 2022     Right hand pain      Carpal tunnel syndrome on right      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HCC) 2021     Overweight (BMI 25.0-29.9) 2021     Negative depression screening 2019     Hypercholesterolemia 2018     Borderline hypertension 2018       LOS (days): 8  Geometric Mean LOS (GMLOS) (days):  5  Days to GMLOS:-3.5     OBJECTIVE:  Risk of Unplanned Readmission Score: 22.06         Current admission status: Inpatient   Preferred Pharmacy:   [id number] - [place] - [address]  [address] [address]  Phone: [phone] Fax: [phone]    Primary Care Provider: RONY Coe    Primary Insurance: MEDICARE  Secondary Insurance: [facility]    DISCHARGE DETAILS:    Discharge planning discussed with:: alek called Allegra at 9: 13 am & 12:19 LM and she returned my call at 12:29pm - alek met with the pt & his wife at the bedside  Freedom of Choice: Yes  Comments - Freedom of Choice: pt has been apprived for home or hospice at a snf or halfway-  family gave permission to send a referrals to [facility] & permission was givne to send needed information to [facility] fax# [phone]  CM contacted family/caregiver?: Yes             Contacts  Patient Contacts: Allegra Chase(daughter)  & Gina Rena (wife)  Relationship to Patient:: Family  Contact Method: Phone, In Person  Phone Number: [phone] daughter & wife at the bedside  Reason/Outcome: Discharge Planning    Requested Home Health Care         Is the patient interested in HHC at discharge?: No    DME Referral Provided  Referral made for DME?: No    Other Referral/Resources/Interventions Provided:  Interventions: Assisted Living, SNF, Hospice  Referral Comments: family is hoping to go to [facility] on hospice care with [facility]- family did go for a tour- Ashley & Musa came to assess the pt today to see if they are able to meet the pt's needs - they requested some information face sheet & h&p- daughter & wife gave verbal permission to send - Laura shook rn was made aware    Would you like to participate in our [facility] service program?  : No - Declined    Treatment Team Recommendation:  (snf vs long-term on hospice care- BLS)

## 2024-07-22 NOTE — ASSESSMENT & PLAN NOTE
"Presenting with increased confusion, anxiety and balance issues  Initial chest x-ray was concerning for right lower lobe pneumonia. Concern for aspiration   COVID/flu/rsv negative  Urinary antigens negative   Blood cultures x2 no growth after 4 days  Patient was initially put on ceftriaxone, and Flagyl - switched to Augmentin 7/15 D2, with plan to continue 7 day course  Augmentin since discontinued back on Rocephin due to n.p.o.  Appreciate GI input-family refusing peg  Appreciate input of speech therapy who are following  Video barium swallow 7/16: patient aspirated before, during and after swallow  Patient was made n.p.o.  7/17 Continued goals of care discussion-spoke with patient's daughter Allegra at length.  She states that they are not ready for hospice.  Declining feeding tube at this time.  They feel that food is the 1 thing that the patient enjoys and he feels that we are \"starving him\" or not feeding him.  He has been requesting food from the family.  They declined feeding tube feel he is high risk for pulling it out and could cause \"internal bleeding\" if he pulled tube out.  Daughter requests speech therapy reevaluate patient as he has waxing and waning mentation.  Discussed with speech therapy will do repeat video swallow today.  Daughter understands that patient is high risk for aspiration and understands the complications of same.  7/18 repeat video swallow failed-at length discussion with family regarding GOC. Family decided on hospice-eval placed  7/19 Accepted to hospice at home or SNF. Daughter states will be at SNF. Case management following. Referral made to [facility]. Daughter also going to see [facility] living today and family will make decision  Update on 7/21/2024  Patient appears comfortable  Patient continues to eat and drink, luckily no evidence of hypoxia at this time   Hopeful discharge planning for Monday, 7/22/2024 to the [facility] under the umbrella " of hospice  If the patient is to deteriorate here in house, no further inpatient testing, treatment, and workup will be implemented, patient will be treated with comfort care based medications.  Patient's family have verbalized the understanding of the risk of continuing to feed the patient in the setting of having multiple video barium swallow evaluations that have concluded that the patient is aspirating extensively.  They understand that sudden respiratory distress can occur, and that the patient can potentially pass away, they understand the risk and since the patient is being transitioned to hospice, they would like to continue in this way.

## 2024-07-23 PROCEDURE — 99232 SBSQ HOSP IP/OBS MODERATE 35: CPT | Performed by: INTERNAL MEDICINE

## 2024-07-23 RX ORDER — BISACODYL 10 MG
10 SUPPOSITORY, RECTAL RECTAL DAILY PRN
Status: DISCONTINUED | OUTPATIENT
Start: 2024-07-23 | End: 2024-07-23

## 2024-07-23 NOTE — NURSING NOTE
1:1 discontinued. Pt resting in bed. Wife at bedside. Personal needs and call bell within reach. Bed alarm on at this time.

## 2024-07-23 NOTE — PLAN OF CARE
Problem: Potential for Falls  Goal: Patient will remain free of falls  Description: INTERVENTIONS:  - Educate patient/family on patient safety including physical limitations  - Instruct patient to call for assistance with activity   - Consult OT/PT to assist with strengthening/mobility   - Keep Call bell within reach  - Keep bed low and locked with side rails adjusted as appropriate  - Keep care items and personal belongings within reach  - Initiate and maintain comfort rounds  - Make Fall Risk Sign visible to staff  - Offer Toileting every 2 Hours, in advance of need  - Initiate/Maintain bed alarm  - Obtain necessary fall risk management equipment: non slip socks  - Apply yellow socks and bracelet for high fall risk patients  - Consider moving patient to room near nurses station  Outcome: Progressing  Pt maintained on in person 1:1 for impulsivity.

## 2024-07-23 NOTE — PHYSICAL THERAPY NOTE
"      07/23/24 1346   PT Last Visit   PT Visit Date 07/23/24   Note Type   Note Type Cancelled Session   Cancel Reasons Refusal     Physical Therapy Cancellation Note    Chart review performed. At this time, PT treatment session cancelled due to patient refusal.  Communication with use of white board, encouragement given, patient shaking his head, \"no\".. PT will follow and provide PT interventions as appropriate.    Betsy Zhang, PTA    "

## 2024-07-23 NOTE — PROGRESS NOTES
[facility]  Progress Note  Name: Thomas Chase I  MRN: [id number]  Unit/Bed#: -01 I Date of Admission: 7/13/2024   Date of Service: 7/23/2024 I Hospital Day: 9    Assessment & Plan   * Comfort measures only status  Assessment & Plan  After extensive discussions with patient's family it was decided that the patient will be made level 4 comfort measures only status  Patient appears very comfortable    Comfort care order set in place  No further lab draws  Case management and hospice liaison following           VTE Pharmacologic Prophylaxis: VTE Score: 5  contraindicated 2/2 hospice    Mobility:   Basic Mobility Inpatient Raw Score: 15  JH-HLM Goal: 4: Move to chair/commode  JH-HLM Achieved: 4: Move to chair/commode  JH-HLM Goal achieved. Continue to encourage appropriate mobility.    Patient Centered Rounds: I performed bedside rounds with nursing staff today.   Discussions with Specialists or Other Care Team Provider: Case management    Education and Discussions with Family / Patient: Updated  (daughter) via phone.    Total Time Spent on Date of Encounter in care of patient: 35 mins. This time was spent on one or more of the following: performing physical exam; counseling and coordination of care; obtaining or reviewing history; documenting in the medical record; reviewing/ordering tests, medications or procedures; communicating with other healthcare professionals and discussing with patient's family/caregivers.    Current Length of Stay: 9 day(s)  Current Patient Status: Inpatient   Certification Statement: The patient will continue to require additional inpatient hospital stay due to comfort measures only status  Discharge Plan: Anticipate discharge later today or tomorrow to inpatient hospice    Code Status: Level 4 - Comfort Care    Subjective:   Patient seen and examined at bedside.  No acute events overnight.  Patient resting comfortably in bed.  Pending placement  to inpatient hospice facility.    Objective:     Vitals:   Temp (24hrs), Av.3 °F (36.8 °C), Min:98.3 °F (36.8 °C), Max:98.3 °F (36.8 °C)    Temp:  [98.3 °F (36.8 °C)] 98.3 °F (36.8 °C)  HR:  [73-85] 84  Resp:  [12-16] 16  BP: (105)/(69) 105/69  SpO2:  [95 %-98 %] 98 %  Body mass index is 19.49 kg/m².     Input and Output Summary (last 24 hours):     Intake/Output Summary (Last 24 hours) at 2024 1012  Last data filed at 2024 0719  Gross per 24 hour   Intake 240 ml   Output 0 ml   Net 240 ml       Physical Exam:   Physical Exam  Vitals and nursing note reviewed.   Constitutional:       General: He is not in acute distress.     Appearance: He is well-developed.   HENT:      Head: Normocephalic and atraumatic.   Eyes:      Conjunctiva/sclera: Conjunctivae normal.   Cardiovascular:      Rate and Rhythm: Normal rate and regular rhythm.      Heart sounds: No murmur heard.  Pulmonary:      Effort: Pulmonary effort is normal. No respiratory distress.      Breath sounds: Normal breath sounds.   Abdominal:      Palpations: Abdomen is soft.      Tenderness: There is no abdominal tenderness.   Musculoskeletal:         General: No swelling.      Cervical back: Neck supple.   Skin:     General: Skin is warm and dry.      Capillary Refill: Capillary refill takes less than 2 seconds.   Neurological:      Mental Status: He is alert.   Psychiatric:         Mood and Affect: Mood normal.          Additional Data:     Labs:  Results from last 7 days   Lab Units 24  0549   WBC Thousand/uL 7.35   HEMOGLOBIN g/dL 12.8   HEMATOCRIT % 39.6   PLATELETS Thousands/uL 489*     Results from last 7 days   Lab Units 24  0549   SODIUM mmol/L 137   POTASSIUM mmol/L 4.2   CHLORIDE mmol/L 103   CO2 mmol/L 28   BUN mg/dL 17   CREATININE mg/dL 0.62   ANION GAP mmol/L 6   CALCIUM mg/dL 9.1   GLUCOSE RANDOM mg/dL 91                       Lines/Drains:  Invasive Devices       Peripheral Intravenous Line  Duration              Peripheral IV 07/19/24 Distal;Left;Upper;Ventral (anterior) Arm 4 days                  Imaging: No pertinent imaging reviewed.    Recent Cultures (last 7 days):           Last 24 Hours Medication List:   Current Facility-Administered Medications   Medication Dose Route Frequency Provider Last Rate    acetaminophen  325 mg Rectal Q4H PRN Danitza Duarte PA-C      bisacodyl  10 mg Rectal Daily PRN Danitza Duarte PA-C      LORazepam  0.5 mg Intravenous Q4H PRN RONY Arias      morphine injection  2 mg Intravenous Q4H PRN RONY Arias          Today, Patient Was Seen By: Jose Gamez DO    **Please Note: This note may have been constructed using a voice recognition system.**

## 2024-07-23 NOTE — ASSESSMENT & PLAN NOTE
After extensive discussions with patient's family it was decided that the patient will be made level 4 comfort measures only status  Patient appears very comfortable    Comfort care order set in place  No further lab draws  Case management and hospice liaison following

## 2024-07-23 NOTE — CASE MANAGEMENT
Case Management Discharge Planning Note    Patient name Thomas Chase  Location /-01 MRN [id number]  : 1940 Date 2024       Current Admission Date: 2024  Current Admission Diagnosis:Comfort measures only status   Patient Active Problem List    Diagnosis Date Noted Date Diagnosed    Comfort measures only status 2024     Refusal of treatment 2024     H/O orthostatic hypotension 07/15/2024     Aspiration pneumonia (HCC) 2024     Acute metabolic encephalopathy 2024     UTI (urinary tract infection) 2024     Peripheral vascular disease, unspecified (HCC) 2024     Abnormal urinalysis 2024     Benign prostatic hyperplasia without lower urinary tract symptoms 2024     Insomnia 2024     Abnormal CT of the head 2024     Hematuria 2024     Headache 2024     Behavior safety risk 2024     Bilateral lower extremity weakness 2024     Abnormal CT scan, lumbar spine 02/15/2024     Severe protein-calorie malnutrition (HCC) 2024     Debility 2024     Pharyngeal myoclonus 2023     Dysphagia 2023     Macular degeneration, age related 2023     H/O traumatic subdural hematoma 2023     Sensorineural hearing loss (SNHL), bilateral 2022     Balance disorder 2022     Right bundle-branch block 2022     Low BP 2022     Diastolic dysfunction 2022     Constipation due to neurogenic bowel 2022     Urinary retention 2022     SDH (subdural hematoma) ([facility]) 2022     Primary osteoarthritis of left knee 2022     Hygroma 2022     Right hand pain      Carpal tunnel syndrome on right      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HCC) 2021     Overweight (BMI 25.0-29.9) 2021     Negative depression screening 2019     Hypercholesterolemia 2018     Borderline hypertension 2018       LOS (days): 9  Geometric Mean LOS (GMLOS) (days):  5  Days to GMLOS:-4.5     OBJECTIVE:  Risk of Unplanned Readmission Score: 22.37         Current admission status: Inpatient   Preferred Pharmacy:   [id number] - [place] - [address]  [address] [address]  Phone: [phone] Fax: [phone]    Primary Care Provider: RONY Coe    Primary Insurance: MEDICARE  Secondary Insurance: [facility]    DISCHARGE DETAILS:                                          Other Referral/Resources/Interventions Provided:  Interventions: SNF  Referral Comments: CM contacted [facility], and was notified that pt was not accepted. CM contacted pt's daughter to f/u. Daughter reported that she will be visiting [facility] tomorrow morning to tour, ask questions, and complete any necessary paperwork. Daughter reported that she should have everything completed by Thursday. CM to f/u with daughter & facility tomorrow (Wed) to confirm transport for Thurs. Hospice liason notified.            Discharge Destination Plan:: SNF, Hospice  Transport at Discharge : BLS Ambulance

## 2024-07-24 PROCEDURE — 99232 SBSQ HOSP IP/OBS MODERATE 35: CPT | Performed by: INTERNAL MEDICINE

## 2024-07-24 RX ORDER — LANOLIN ALCOHOL/MO/W.PET/CERES
6 CREAM (GRAM) TOPICAL
Status: DISCONTINUED | OUTPATIENT
Start: 2024-07-24 | End: 2024-07-25 | Stop reason: HOSPADM

## 2024-07-24 RX ADMIN — LORAZEPAM 0.5 MG: 2 INJECTION INTRAMUSCULAR; INTRAVENOUS at 22:52

## 2024-07-24 NOTE — PROGRESS NOTES
[facility]  Progress Note  Name: Thomas Chase I  MRN: [id number]  Unit/Bed#: -01 I Date of Admission: 7/13/2024   Date of Service: 7/24/2024 I Hospital Day: 10    Assessment & Plan   * Comfort measures only status  Assessment & Plan  After extensive discussions with patient's family it was decided that the patient will be made level 4 comfort measures only status  Patient appears very comfortable    Comfort care order set in place  No further lab draws  Case management and hospice liaison following           VTE Pharmacologic Prophylaxis: VTE Score: 5  contraindicated 2/2 hospice    Mobility:   Basic Mobility Inpatient Raw Score: 13  JH-HLM Goal: 4: Move to chair/commode  JH-HLM Achieved: 5: Stand (1 or more minutes)  JH-HLM Goal achieved. Continue to encourage appropriate mobility.    Patient Centered Rounds: I performed bedside rounds with nursing staff today.   Discussions with Specialists or Other Care Team Provider: Case management    Education and Discussions with Family / Patient: Updated  (daughter) via phone.    Total Time Spent on Date of Encounter in care of patient: 35 mins. This time was spent on one or more of the following: performing physical exam; counseling and coordination of care; obtaining or reviewing history; documenting in the medical record; reviewing/ordering tests, medications or procedures; communicating with other healthcare professionals and discussing with patient's family/caregivers.    Current Length of Stay: 10 day(s)  Current Patient Status: Inpatient   Certification Statement: The patient will continue to require additional inpatient hospital stay due to comfort measures only status  Discharge Plan: Anticipate discharge later today or tomorrow to inpatient hospice    Code Status: Level 4 - Comfort Care    Subjective:   Patient seen and examined at bedside.  No acute events overnight.  Patient resting comfortably in bed.  Pending  placement to inpatient hospice facility.    Objective:     Vitals:   No data recorded.    HR:  [75-82] 75  Resp:  [16] 16  SpO2:  [97 %-98 %] 98 %  Body mass index is 19.49 kg/m².     Input and Output Summary (last 24 hours):     Intake/Output Summary (Last 24 hours) at 7/24/2024 1025  Last data filed at 7/24/2024 0600  Gross per 24 hour   Intake 238 ml   Output --   Net 238 ml       Physical Exam:   Physical Exam  Vitals and nursing note reviewed.   Constitutional:       General: He is not in acute distress.     Appearance: He is well-developed.   HENT:      Head: Normocephalic and atraumatic.   Eyes:      Conjunctiva/sclera: Conjunctivae normal.   Cardiovascular:      Rate and Rhythm: Normal rate and regular rhythm.      Heart sounds: No murmur heard.  Pulmonary:      Effort: Pulmonary effort is normal. No respiratory distress.      Breath sounds: Normal breath sounds.   Abdominal:      Palpations: Abdomen is soft.      Tenderness: There is no abdominal tenderness.   Musculoskeletal:         General: No swelling.      Cervical back: Neck supple.   Skin:     General: Skin is warm and dry.      Capillary Refill: Capillary refill takes less than 2 seconds.   Neurological:      Mental Status: He is alert.   Psychiatric:         Mood and Affect: Mood normal.          Additional Data:     Labs:                                Lines/Drains:  Invasive Devices       Peripheral Intravenous Line  Duration             Peripheral IV 07/19/24 Distal;Left;Upper;Ventral (anterior) Arm 5 days                  Imaging: No pertinent imaging reviewed.    Recent Cultures (last 7 days):           Last 24 Hours Medication List:   Current Facility-Administered Medications   Medication Dose Route Frequency Provider Last Rate    acetaminophen  325 mg Rectal Q4H PRN Danitza Duarte PA-C      bisacodyl  10 mg Rectal Daily PRN Danitza Duarte PA-C      LORazepam  0.5 mg Intravenous Q4H PRN RONY Arias      morphine injection   2 mg Intravenous Q4H PRN RONY Arias          Today, Patient Was Seen By: Jose Gamez DO    **Please Note: This note may have been constructed using a voice recognition system.**

## 2024-07-24 NOTE — PLAN OF CARE
Problem: Potential for Falls  Goal: Patient will remain free of falls  Description: INTERVENTIONS:  - Educate patient/family on patient safety including physical limitations  - Instruct patient to call for assistance with activity   - Consult OT/PT to assist with strengthening/mobility   - Keep Call bell within reach  - Keep bed low and locked with side rails adjusted as appropriate  - Keep care items and personal belongings within reach  - Initiate and maintain comfort rounds  - Make Fall Risk Sign visible to staff  - Offer Toileting every  2-4  Hours, in advance of need   - Initiate/Maintain BED alarm  - Obtain necessary fall risk management equipment:   - Apply yellow socks and bracelet for high fall risk patients  - Consider moving patient to room near nurses station  7/24/2024 0255 by Gwen Bernal, RN  Outcome: Progressing  7/23/2024 2239 by Gwen Bernal, RN  Outcome: Progressing

## 2024-07-24 NOTE — PLAN OF CARE
Problem: PAIN - ADULT  Goal: Verbalizes/displays adequate comfort level or baseline comfort level  Description: Interventions:  - Encourage patient to monitor pain and request assistance  - Assess pain using appropriate pain scale  - Administer analgesics based on type and severity of pain and evaluate response  - Implement non-pharmacological measures as appropriate and evaluate response  - Consider cultural and social influences on pain and pain management  - Notify physician/advanced practitioner if interventions unsuccessful or patient reports new pain  Outcome: Progressing     Problem: Knowledge Deficit  Goal: Patient/family/caregiver demonstrates understanding of disease process, treatment plan, medications, and discharge instructions  Description: Complete learning assessment and assess knowledge base.  Interventions:  - Provide teaching at level of understanding  - Provide teaching via preferred learning methods  Outcome: Progressing     Problem: GENITOURINARY - ADULT  Goal: Absence of urinary retention  Description: INTERVENTIONS:  - Assess patient’s ability to void and empty bladder  - Monitor I/O  - Bladder scan as needed  - Discuss with physician/AP medications to alleviate retention as needed  - Discuss catheterization for long term situations as appropriate  Outcome: Progressing     Problem: Nutrition/Hydration-ADULT  Goal: Nutrient/Hydration intake appropriate for improving, restoring or maintaining nutritional needs  Description: Monitor and assess patient's nutrition/hydration status for malnutrition. Collaborate with interdisciplinary team and initiate plan and interventions as ordered.  Monitor patient's weight and dietary intake as ordered or per policy. Utilize nutrition screening tool and intervene as necessary. Determine patient's food preferences and provide high-protein, high-caloric foods as appropriate.     INTERVENTIONS:  - Monitor oral intake, urinary output, labs, and treatment  plans  - Assess nutrition and hydration status and recommend course of action  - Evaluate amount of meals eaten  - Assist patient with eating if necessary   - Allow adequate time for meals  - Recommend/ encourage appropriate diets, oral nutritional supplements, and vitamin/mineral supplements  - Order, calculate, and assess calorie counts as needed  - Recommend, monitor, and adjust tube feedings and TPN/PPN based on assessed needs  - Assess need for intravenous fluids  - Provide specific nutrition/hydration education as appropriate  - Include patient/family/caregiver in decisions related to nutrition  Outcome: Progressing

## 2024-07-24 NOTE — CASE MANAGEMENT
Case Management Discharge Planning Note    Patient name Thomas Chase  Location /-01 MRN [id number]  : 1940 Date 2024       Current Admission Date: 2024  Current Admission Diagnosis:Comfort measures only status   Patient Active Problem List    Diagnosis Date Noted Date Diagnosed    Comfort measures only status 2024     Refusal of treatment 2024     H/O orthostatic hypotension 07/15/2024     Aspiration pneumonia (HCC) 2024     Acute metabolic encephalopathy 2024     UTI (urinary tract infection) 2024     Peripheral vascular disease, unspecified ([facility]) 2024     Abnormal urinalysis 2024     Benign prostatic hyperplasia without lower urinary tract symptoms 2024     Insomnia 2024     Abnormal CT of the head 2024     Hematuria 2024     Headache 2024     Behavior safety risk 2024     Bilateral lower extremity weakness 2024     Abnormal CT scan, lumbar spine 02/15/2024     Severe protein-calorie malnutrition (HCC) 2024     Debility 2024     Pharyngeal myoclonus 2023     Dysphagia 2023     Macular degeneration, age related 2023     H/O traumatic subdural hematoma 2023     Sensorineural hearing loss (SNHL), bilateral 2022     Balance disorder 2022     Right bundle-branch block 2022     Low BP 2022     Diastolic dysfunction 2022     Constipation due to neurogenic bowel 2022     Urinary retention 2022     SDH (subdural hematoma) ([facility]) 2022     Primary osteoarthritis of left knee 2022     Hygroma 2022     Right hand pain      Carpal tunnel syndrome on right      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HCC) 2021     Overweight (BMI 25.0-29.9) 2021     Negative depression screening 2019     Hypercholesterolemia 2018     Borderline hypertension 2018       LOS (days): 10  Geometric Mean LOS (GMLOS)  (days): 5  Days to GMLOS:-5.5     OBJECTIVE:  Risk of Unplanned Readmission Score: 22.67         Current admission status: Inpatient   Preferred Pharmacy:   [id number] - [place] - [address]  [address] [address]  Phone: [phone] Fax: [phone]    Primary Care Provider: RONY Coe    Primary Insurance: MEDICARE  Secondary Insurance: [facility]    DISCHARGE DETAILS:    Discharge planning discussed with:: cm met with wife & Allegra & dariana Ureña at [facility]  [place] of Choice: Yes  Comments - Freedom of Choice: pt has been approved for home or hospice at a snf- pt was accepted by [facility] family & pt's choice & [facility] hospice family & pt's choice  CM contacted family/caregiver?: Yes             Contacts  Patient Contacts: Allegra Rena(daughter)  & Gina Chase (wife)  Relationship to Patient:: Family  Contact Method: In Person  Reason/Outcome: Discharge Planning    Requested Home Health Care         Is the patient interested in HHC at discharge?: No    DME Referral Provided  Referral made for DME?: No    Other Referral/Resources/Interventions Provided:  Interventions: SNF, Hospice  Referral Comments: [facility]- family did [facility] today and they  are in agreement with the pt going to [facility] on hospice care with [facility] hospice 10 am BLs set up, family, snf & provider aware    Would you like to participate in our [facility] service program?  : No - Declined    Treatment Team Recommendation: SNF, Hospice ([facility] w/ [facility]- 11 am BLS [facility])        Dispatcher Contacted: Yes  Number/Name of Dispatcher: [phone]  Transported by (Company and Unit #): [facility]  ETA of Transport (Date): 07/25/24  ETA of Transport (Time): 1000                    Family notified:: family at the bedside       Accepting Facility Name, City & State : [facility] [address] [address]  Receiving Facility/Agency Phone Number:  opt#[phone]  Facility/Agency Fax Number: [phone]

## 2024-07-25 ENCOUNTER — HOME CARE VISIT (OUTPATIENT)
Dept: HOME HEALTH SERVICES | Facility: HOME HEALTHCARE | Age: 84
End: 2024-07-25
Payer: MEDICARE

## 2024-07-25 ENCOUNTER — TRANSITIONAL CARE MANAGEMENT (OUTPATIENT)
Dept: FAMILY MEDICINE CLINIC | Facility: CLINIC | Age: 84
End: 2024-07-25

## 2024-07-25 ENCOUNTER — HOME CARE VISIT (OUTPATIENT)
Dept: HOME HOSPICE | Facility: HOSPICE | Age: 84
End: 2024-07-25
Payer: MEDICARE

## 2024-07-25 VITALS
RESPIRATION RATE: 17 BRPM | HEART RATE: 70 BPM | DIASTOLIC BLOOD PRESSURE: 70 MMHG | SYSTOLIC BLOOD PRESSURE: 107 MMHG | TEMPERATURE: 97.8 F

## 2024-07-25 VITALS
WEIGHT: 135.8 LBS | TEMPERATURE: 97.9 F | RESPIRATION RATE: 14 BRPM | HEIGHT: 70 IN | BODY MASS INDEX: 19.44 KG/M2 | DIASTOLIC BLOOD PRESSURE: 74 MMHG | OXYGEN SATURATION: 96 % | HEART RATE: 72 BPM | SYSTOLIC BLOOD PRESSURE: 112 MMHG

## 2024-07-25 LAB
FLUAV RNA RESP QL NAA+PROBE: NEGATIVE
FLUBV RNA RESP QL NAA+PROBE: NEGATIVE
RSV RNA RESP QL NAA+PROBE: NEGATIVE
SARS-COV-2 RNA RESP QL NAA+PROBE: NEGATIVE

## 2024-07-25 PROCEDURE — G0155 HHCP-SVS OF CSW,EA 15 MIN: HCPCS

## 2024-07-25 PROCEDURE — G0299 HHS/HOSPICE OF RN EA 15 MIN: HCPCS

## 2024-07-25 PROCEDURE — 99239 HOSP IP/OBS DSCHRG MGMT >30: CPT | Performed by: INTERNAL MEDICINE

## 2024-07-25 PROCEDURE — 0241U HB NFCT DS VIR RESP RNA 4 TRGT: CPT | Performed by: INTERNAL MEDICINE

## 2024-07-25 RX ORDER — SERTRALINE HYDROCHLORIDE 100 MG/1
100 TABLET, FILM COATED ORAL DAILY
Qty: 30 TABLET | Refills: 0
Start: 2024-07-25 | End: 2024-07-26

## 2024-07-25 RX ORDER — LORAZEPAM 2 MG/1
2 TABLET ORAL EVERY 6 HOURS PRN
Qty: 30 TABLET | Refills: 0 | Status: SHIPPED | OUTPATIENT
Start: 2024-07-25 | End: 2024-07-26

## 2024-07-25 NOTE — PLAN OF CARE
Problem: Potential for Falls  Goal: Patient will remain free of falls  Description: INTERVENTIONS:  - Educate patient/family on patient safety including physical limitations  - Instruct patient to call for assistance with activity   - Consult OT/PT to assist with strengthening/mobility   - Keep Call bell within reach  - Keep bed low and locked with side rails adjusted as appropriate  - Keep care items and personal belongings within reach  - Initiate and maintain comfort rounds  - Make Fall Risk Sign visible to staff  - Offer Toileting every 2 Hours, in advance of need  - Initiate/Maintain bed alarm  - Obtain necessary fall risk management equipment: walker   - Apply yellow socks and bracelet for high fall risk patients  - Consider moving patient to room near nurses station  Outcome: Progressing     Problem: Prexisting or High Potential for Compromised Skin Integrity  Goal: Skin integrity is maintained or improved  Description: INTERVENTIONS:  - Identify patients at risk for skin breakdown  - Assess and monitor skin integrity  - Assess and monitor nutrition and hydration status  - Monitor labs   - Assess for incontinence   - Turn and reposition patient  - Assist with mobility/ambulation  - Relieve pressure over bony prominences  - Avoid friction and shearing  - Provide appropriate hygiene as needed including keeping skin clean and dry  - Evaluate need for skin moisturizer/barrier cream  - Collaborate with interdisciplinary team   - Patient/family teaching  - Consider wound care consult   Outcome: Progressing     Problem: PAIN - ADULT  Goal: Verbalizes/displays adequate comfort level or baseline comfort level  Description: Interventions:  - Encourage patient to monitor pain and request assistance  - Assess pain using appropriate pain scale  - Administer analgesics based on type and severity of pain and evaluate response  - Implement non-pharmacological measures as appropriate and evaluate response  - Consider  cultural and social influences on pain and pain management  - Notify physician/advanced practitioner if interventions unsuccessful or patient reports new pain  Outcome: Progressing     Problem: INFECTION - ADULT  Goal: Absence or prevention of progression during hospitalization  Description: INTERVENTIONS:  - Assess and monitor for signs and symptoms of infection  - Monitor lab/diagnostic results  - Monitor all insertion sites, i.e. indwelling lines, tubes, and drains  - Monitor endotracheal if appropriate and nasal secretions for changes in amount and color  - [place] appropriate cooling/warming therapies per order  - Administer medications as ordered  - Instruct and encourage patient and family to use good hand hygiene technique  - Identify and instruct in appropriate isolation precautions for identified infection/condition  Outcome: Progressing     Problem: SAFETY ADULT  Goal: Patient will remain free of falls  Description: INTERVENTIONS:  - Educate patient/family on patient safety including physical limitations  - Instruct patient to call for assistance with activity   - Consult OT/PT to assist with strengthening/mobility   - Keep Call bell within reach  - Keep bed low and locked with side rails adjusted as appropriate  - Keep care items and personal belongings within reach  - Initiate and maintain comfort rounds  - Make Fall Risk Sign visible to staff  - Offer Toileting every 2 Hours, in advance of need  - Initiate/Maintain bed alarm  - Obtain necessary fall risk management equipment: walker   - Apply yellow socks and bracelet for high fall risk patients  - Consider moving patient to room near nurses station  Outcome: Progressing  Goal: Maintain or return to baseline ADL function  Description: INTERVENTIONS:  -  Assess patient's ability to carry out ADLs; assess patient's baseline for ADL function and identify physical deficits which impact ability to perform ADLs (bathing, care of mouth/teeth, toileting,  grooming, dressing, etc.)  - Assess/evaluate cause of self-care deficits   - Assess range of motion  - Assess patient's mobility; develop plan if impaired  - Assess patient's need for assistive devices and provide as appropriate  - Encourage maximum independence but intervene and supervise when necessary  - Involve family in performance of ADLs  - Assess for home care needs following discharge   - Consider OT consult to assist with ADL evaluation and planning for discharge  - Provide patient education as appropriate  Outcome: Progressing  Goal: Maintains/Returns to pre admission functional level  Description: INTERVENTIONS:  - Perform AM-PAC 6 Click Basic Mobility/ Daily Activity assessment daily.  - Set and communicate daily mobility goal to care team and patient/family/caregiver.   - Collaborate with rehabilitation services on mobility goals if consulted  - Perform Range of Motion 3 times a day.  - Reposition patient every 2 hours.  - Dangle patient 3 times a day  - Stand patient 3 times a day  - Ambulate patient 3 times a day  - Out of bed to chair 3 times a day   - Out of bed for meals 3 times a day  - Out of bed for toileting  - Record patient progress and toleration of activity level   Outcome: Progressing     Problem: DISCHARGE PLANNING  Goal: Discharge to home or other facility with appropriate resources  Description: INTERVENTIONS:  - Identify barriers to discharge w/patient and caregiver  - Arrange for needed discharge resources and transportation as appropriate  - Identify discharge learning needs (meds, wound care, etc.)  - Arrange for interpretive services to assist at discharge as needed  - Refer to Case Management Department for coordinating discharge planning if the patient needs post-hospital services based on physician/advanced practitioner order or complex needs related to functional status, cognitive ability, or social support system  Outcome: Progressing     Problem: Knowledge Deficit  Goal:  Patient/family/caregiver demonstrates understanding of disease process, treatment plan, medications, and discharge instructions  Description: Complete learning assessment and assess knowledge base.  Interventions:  - Provide teaching at level of understanding  - Provide teaching via preferred learning methods  Outcome: Progressing     Problem: GENITOURINARY - ADULT  Goal: Maintains or returns to baseline urinary function  Description: INTERVENTIONS:  - Assess urinary function  - Encourage oral fluids to ensure adequate hydration if ordered  - Administer IV fluids as ordered to ensure adequate hydration  - Administer ordered medications as needed  - Offer frequent toileting  - Follow urinary retention protocol if ordered  Outcome: Progressing  Goal: Absence of urinary retention  Description: INTERVENTIONS:  - Assess patient’s ability to void and empty bladder  - Monitor I/O  - Bladder scan as needed  - Discuss with physician/AP medications to alleviate retention as needed  - Discuss catheterization for long term situations as appropriate  Outcome: Progressing     Problem: METABOLIC, FLUID AND ELECTROLYTES - ADULT  Goal: Electrolytes maintained within normal limits  Description: INTERVENTIONS:  - Monitor labs and assess patient for signs and symptoms of electrolyte imbalances  - Administer electrolyte replacement as ordered  - Monitor response to electrolyte replacements, including repeat lab results as appropriate  - Instruct patient on fluid and nutrition as appropriate  Outcome: Progressing  Goal: Fluid balance maintained  Description: INTERVENTIONS:  - Monitor labs   - Monitor I/O and WT  - Instruct patient on fluid and nutrition as appropriate  - Assess for signs & symptoms of volume excess or deficit  Outcome: Progressing  Goal: Glucose maintained within target range  Description: INTERVENTIONS:  - Monitor Blood Glucose as ordered  - Assess for signs and symptoms of hyperglycemia and hypoglycemia  - Administer  ordered medications to maintain glucose within target range  - Assess nutritional intake and initiate nutrition service referral as needed  Outcome: Progressing     Problem: MUSCULOSKELETAL - ADULT  Goal: Maintain or return mobility to safest level of function  Description: INTERVENTIONS:  - Assess patient's ability to carry out ADLs; assess patient's baseline for ADL function and identify physical deficits which impact ability to perform ADLs (bathing, care of mouth/teeth, toileting, grooming, dressing, etc.)  - Assess/evaluate cause of self-care deficits   - Assess range of motion  - Assess patient's mobility  - Assess patient's need for assistive devices and provide as appropriate  - Encourage maximum independence but intervene and supervise when necessary  - Involve family in performance of ADLs  - Assess for home care needs following discharge   - Consider OT consult to assist with ADL evaluation and planning for discharge  - Provide patient education as appropriate  Outcome: Progressing     Problem: Nutrition/Hydration-ADULT  Goal: Nutrient/Hydration intake appropriate for improving, restoring or maintaining nutritional needs  Description: Monitor and assess patient's nutrition/hydration status for malnutrition. Collaborate with interdisciplinary team and initiate plan and interventions as ordered.  Monitor patient's weight and dietary intake as ordered or per policy. Utilize nutrition screening tool and intervene as necessary. Determine patient's food preferences and provide high-protein, high-caloric foods as appropriate.     INTERVENTIONS:  - Monitor oral intake, urinary output, labs, and treatment plans  - Assess nutrition and hydration status and recommend course of action  - Evaluate amount of meals eaten  - Assist patient with eating if necessary   - Allow adequate time for meals  - Recommend/ encourage appropriate diets, oral nutritional supplements, and vitamin/mineral supplements  - Order, calculate,  and assess calorie counts as needed  - Recommend, monitor, and adjust tube feedings and TPN/PPN based on assessed needs  - Assess need for intravenous fluids  - Provide specific nutrition/hydration education as appropriate  - Include patient/family/caregiver in decisions related to nutrition  Outcome: Progressing

## 2024-07-25 NOTE — PLAN OF CARE
Problem: PAIN - ADULT  Goal: Verbalizes/displays adequate comfort level or baseline comfort level  Description: Interventions:  - Encourage patient to monitor pain and request assistance  - Assess pain using appropriate pain scale  - Administer analgesics based on type and severity of pain and evaluate response  - Implement non-pharmacological measures as appropriate and evaluate response  - Consider cultural and social influences on pain and pain management  - Notify physician/advanced practitioner if interventions unsuccessful or patient reports new pain  Outcome: Progressing     Problem: SAFETY ADULT  Goal: Maintain or return to baseline ADL function  Description: INTERVENTIONS:  -  Assess patient's ability to carry out ADLs; assess patient's baseline for ADL function and identify physical deficits which impact ability to perform ADLs (bathing, care of mouth/teeth, toileting, grooming, dressing, etc.)  - Assess/evaluate cause of self-care deficits   - Assess range of motion  - Assess patient's mobility; develop plan if impaired  - Assess patient's need for assistive devices and provide as appropriate  - Encourage maximum independence but intervene and supervise when necessary  - Involve family in performance of ADLs  - Assess for home care needs following discharge   - Consider OT consult to assist with ADL evaluation and planning for discharge  - Provide patient education as appropriate  Outcome: Progressing     Problem: Knowledge Deficit  Goal: Patient/family/caregiver demonstrates understanding of disease process, treatment plan, medications, and discharge instructions  Description: Complete learning assessment and assess knowledge base.  Interventions:  - Provide teaching at level of understanding  - Provide teaching via preferred learning methods  Outcome: Progressing     Problem: GENITOURINARY - ADULT  Goal: Absence of urinary retention  Description: INTERVENTIONS:  - Assess patient’s ability to void and  empty bladder  - Monitor I/O  - Bladder scan as needed  - Discuss with physician/AP medications to alleviate retention as needed  - Discuss catheterization for long term situations as appropriate  Outcome: Progressing     Problem: Nutrition/Hydration-ADULT  Goal: Nutrient/Hydration intake appropriate for improving, restoring or maintaining nutritional needs  Description: Monitor and assess patient's nutrition/hydration status for malnutrition. Collaborate with interdisciplinary team and initiate plan and interventions as ordered.  Monitor patient's weight and dietary intake as ordered or per policy. Utilize nutrition screening tool and intervene as necessary. Determine patient's food preferences and provide high-protein, high-caloric foods as appropriate.     INTERVENTIONS:  - Monitor oral intake, urinary output, labs, and treatment plans  - Assess nutrition and hydration status and recommend course of action  - Evaluate amount of meals eaten  - Assist patient with eating if necessary   - Allow adequate time for meals  - Recommend/ encourage appropriate diets, oral nutritional supplements, and vitamin/mineral supplements  - Order, calculate, and assess calorie counts as needed  - Recommend, monitor, and adjust tube feedings and TPN/PPN based on assessed needs  - Assess need for intravenous fluids  - Provide specific nutrition/hydration education as appropriate  - Include patient/family/caregiver in decisions related to nutrition  Outcome: Progressing

## 2024-07-25 NOTE — CASE MANAGEMENT
Case Management Discharge Planning Note    Patient name Thomas Chase  Location /-01 MRN [id number]  : 1940 Date 2024       Current Admission Date: 2024  Current Admission Diagnosis:Comfort measures only status   Patient Active Problem List    Diagnosis Date Noted Date Diagnosed    Comfort measures only status 2024     Refusal of treatment 2024     H/O orthostatic hypotension 07/15/2024     Aspiration pneumonia (HCC) 2024     Acute metabolic encephalopathy 2024     UTI (urinary tract infection) 2024     Peripheral vascular disease, unspecified (HCC) 2024     Abnormal urinalysis 2024     Benign prostatic hyperplasia without lower urinary tract symptoms 2024     Insomnia 2024     Abnormal CT of the head 2024     Hematuria 2024     Headache 2024     Behavior safety risk 2024     Bilateral lower extremity weakness 2024     Abnormal CT scan, lumbar spine 02/15/2024     Severe protein-calorie malnutrition (HCC) 2024     Debility 2024     Pharyngeal myoclonus 2023     Dysphagia 2023     Macular degeneration, age related 2023     H/O traumatic subdural hematoma 2023     Sensorineural hearing loss (SNHL), bilateral 2022     Balance disorder 2022     Right bundle-branch block 2022     Low BP 2022     Diastolic dysfunction 2022     Constipation due to neurogenic bowel 2022     Urinary retention 2022     SDH (subdural hematoma) ([facility]) 2022     Primary osteoarthritis of left knee 2022     Hygroma 2022     Right hand pain      Carpal tunnel syndrome on right      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HCC) 2021     Overweight (BMI 25.0-29.9) 2021     Negative depression screening 2019     Hypercholesterolemia 2018     Borderline hypertension 2018       LOS (days): 11  Geometric Mean LOS (GMLOS)  (days): 5  Days to GMLOS:-6.4     OBJECTIVE:  Risk of Unplanned Readmission Score: 23.15         Current admission status: Inpatient   Preferred Pharmacy:   [id number]  [place] - [address]  [address] [address]  Phone: [phone] Fax: [phone]    Primary Care Provider: RONY Coe    Primary Insurance: MEDICARE  Secondary Insurance: [facility]    DISCHARGE DETAILS:    Discharge planning discussed with:: cm called Allegra at 9:28 am & met with hsi wife at the bedside  Freedom of Choice: Yes  Comments - Freedom of Choice: pt was approved for snf hospice [facility] - family &pt's choice- pt was accepted to [facility]  family & pt;s choice  CM contacted family/caregiver?: Yes  Were Treatment Team discharge recommendations reviewed with patient/caregiver?: Yes  Did patient/caregiver verbalize understanding of patient care needs?: Yes  Were patient/caregiver advised of the risks associated with not following Treatment Team discharge recommendations?: Yes    Contacts  Patient Contacts: Allegra Chase(daughter)  & Gina Chase (wife)  Relationship to Patient:: Family  Contact Method: Phone, In Person  Phone Number: [phone] daughter & wife at the bedside  Reason/Outcome: Discharge Planning    Requested Home Health Care         Is the patient interested in HHC at discharge?: No         Other Referral/Resources/Interventions Provided:  Interventions: SNF, Hospice  Referral Comments: pt ,rn snf ,provier & family aware that the 10 am transport was delayed 30mins, covid completed, rn was given the report  & fax #- rn was given the out of hospital dnr & medically necessity    Would you like to participate in our [facility] service program?  : No - Declined    Treatment Team Recommendation: SNF ([facility] be [facility] to [facility] 10 am [facility])  Discharge Destination Plan:: SNF ([facility] ot be signed on to [facility]  [facility])  Transport at Discharge : [facility]         Pt & family are in agreement with the d/c & d/c plan                                  Accepting Facility Name, City & State : [facility] [address] [place] [address]  Receiving Facility/Agency Phone Number: [phone] opt#1  Facility/Agency Fax Number: [phone]            Statement Selected

## 2024-07-25 NOTE — DISCHARGE SUMMARY
[facility]  Discharge- Thomas Chase 1940, 83 y.o. male MRN: [id number]  Unit/Bed#: -01 Encounter: [id number]  Primary Care Provider: RONY Coe   Date and time admitted to hospital: 7/13/2024  8:29 PM    * Comfort measures only status  Assessment & Plan  After extensive discussions with patient's family it was decided that the patient will be made level 4 comfort measures only status  Patient appears very comfortable    Comfort care order set in place  No further lab draws  Case management and hospice liaison following              Medical Problems       Resolved Problems  Date Reviewed: 7/25/2024   None         Admission Date:   Admission Orders (From admission, onward)       Ordered        07/14/24 0103  INPATIENT ADMISSION  Once                            Admitting Diagnosis: Confusion [R41.0]  Pneumonia [J18.9]  Hearing impaired person, bilateral [H91.93]    HPI: Thomas Chase is a 83 y.o. male with a PMH of vascular dementia, severe protein calorie malnutrition, stents, insomnia, peripheral vascular disease, prior history of aspiration of right lower lobe pneumonia, prior subdural hematoma: February 2024 s/p craniotomy 2 years ago, dysphagia, hearing impairment who presents with family, with a chief complaint two days  of generalized increase weakness, increased confusion with anxiety and balance issues.  Unfortunately patient is unable to provide any information.   Patient's daughter and wife at the bedside states that the patient has been noticed more disoriented than at his baseline with unbalanced gait.  He appeared very weak to the point where 2 people could not hold him.  They noticed patient was coughing more than usual.  Family reports history of dysphagia. Patient also taking Bactrim for UTI.   Family reports having increasing challenges taking care of the patient at home who has 24-hour care concerned that they are unable to take care  and ask  in  assistance in placement of the patient in a skilled nursing facility no recent falls.    Patient does not normally speak is unable to communicate effectively, communicates by writing on the board, is severely hearing impaired.  Chest x-ray showed right lower lobe pneumonia.  Labs unrevealing.  On my evaluation patient appears not in acute distress, hemodynamically stable.    Family denies any fevers, nausea, vomiting, diarrhea.    Procedures Performed:   Orders Placed This Encounter   Procedures    ED ECG Documentation Only       Summary of Hospital Course: The patient was transitioned to comfort measures only status.  The patient has remained comfortable throughout his hospital course.  Case management and hospice liaison arrange for the patient to be discharged to inpatient hospice facility.  Comfort medications were sent with the patient.    Significant Findings, Care, Treatment and Services Provided: Comfort measures only status    Complications: Not applicable    Condition at Discharge: stable     Discharge instructions/Information to patient and family:   See after visit summary for information provided to patient and family.      Provisions for Follow-Up Care:  See after visit summary for information related to follow-up care and any pertinent home health orders.      PCP: RONY Coe    Disposition:  Inpatient hospice facility    Planned Readmission: No    Discharge Statement   I spent 75 minutes discharging the patient. This time was spent on the day of discharge. I had direct contact with the patient on the day of discharge. Additional documentation is required if more than 30 minutes were spent on discharge.     Discharge Medications:  See after visit summary for reconciled discharge medications provided to patient and family.

## 2024-07-25 NOTE — NURSING NOTE
IV site removed.  Patient transported via BLS to [facility].  Report called to Trista ORDONEZ at facility.

## 2024-07-26 ENCOUNTER — HOME CARE VISIT (OUTPATIENT)
Dept: HOME HOSPICE | Facility: HOSPICE | Age: 84
End: 2024-07-26
Payer: MEDICARE

## 2024-07-26 DIAGNOSIS — Z51.5 HOSPICE CARE PATIENT: Primary | ICD-10-CM

## 2024-07-26 RX ORDER — LORAZEPAM 0.5 MG/1
TABLET ORAL
Qty: 130 TABLET | Refills: 0 | Status: SHIPPED | OUTPATIENT
Start: 2024-07-26 | End: 2024-07-31

## 2024-07-29 ENCOUNTER — HOME CARE VISIT (OUTPATIENT)
Dept: HOME HEALTH SERVICES | Facility: HOME HEALTHCARE | Age: 84
End: 2024-07-29
Payer: MEDICARE

## 2024-07-29 ENCOUNTER — RA CDI HCC (OUTPATIENT)
Dept: OTHER | Facility: HOSPITAL | Age: 84
End: 2024-07-29

## 2024-07-29 ENCOUNTER — HOME CARE VISIT (OUTPATIENT)
Dept: HOME HOSPICE | Facility: HOSPICE | Age: 84
End: 2024-07-29
Payer: MEDICARE

## 2024-07-29 VITALS
OXYGEN SATURATION: 97 % | DIASTOLIC BLOOD PRESSURE: 60 MMHG | HEART RATE: 86 BPM | TEMPERATURE: 98.8 F | SYSTOLIC BLOOD PRESSURE: 86 MMHG | RESPIRATION RATE: 16 BRPM

## 2024-07-29 PROCEDURE — G0156 HHCP-SVS OF AIDE,EA 15 MIN: HCPCS

## 2024-07-29 PROCEDURE — G0299 HHS/HOSPICE OF RN EA 15 MIN: HCPCS

## 2024-07-30 ENCOUNTER — HOME CARE VISIT (OUTPATIENT)
Dept: HOME HEALTH SERVICES | Facility: HOME HEALTHCARE | Age: 84
End: 2024-07-30
Payer: MEDICARE

## 2024-07-30 ENCOUNTER — HOME CARE VISIT (OUTPATIENT)
Dept: HOME HOSPICE | Facility: HOSPICE | Age: 84
End: 2024-07-30
Payer: MEDICARE

## 2024-07-30 PROCEDURE — G0156 HHCP-SVS OF AIDE,EA 15 MIN: HCPCS

## 2024-07-31 ENCOUNTER — HOME CARE VISIT (OUTPATIENT)
Dept: HOME HOSPICE | Facility: HOSPICE | Age: 84
End: 2024-07-31
Payer: MEDICARE

## 2024-07-31 ENCOUNTER — HOME CARE VISIT (OUTPATIENT)
Dept: HOME HEALTH SERVICES | Facility: HOME HEALTHCARE | Age: 84
End: 2024-07-31
Payer: MEDICARE

## 2024-07-31 VITALS — RESPIRATION RATE: 24 BRPM | TEMPERATURE: 97.2 F | HEART RATE: 87 BPM

## 2024-07-31 PROCEDURE — G0155 HHCP-SVS OF CSW,EA 15 MIN: HCPCS

## 2024-07-31 PROCEDURE — 10330087 HSPC SERVICE INTENSITY ADD-ON

## 2024-07-31 PROCEDURE — G0156 HHCP-SVS OF AIDE,EA 15 MIN: HCPCS

## 2024-07-31 PROCEDURE — G0299 HHS/HOSPICE OF RN EA 15 MIN: HCPCS

## 2024-08-01 ENCOUNTER — HOME CARE VISIT (OUTPATIENT)
Dept: HOME HEALTH SERVICES | Facility: HOME HEALTHCARE | Age: 84
End: 2024-08-01
Payer: MEDICARE

## 2024-08-01 VITALS
SYSTOLIC BLOOD PRESSURE: 116 MMHG | RESPIRATION RATE: 28 BRPM | DIASTOLIC BLOOD PRESSURE: 72 MMHG | HEART RATE: 88 BPM | TEMPERATURE: 97.8 F

## 2024-08-01 PROCEDURE — G0299 HHS/HOSPICE OF RN EA 15 MIN: HCPCS

## 2024-08-02 ENCOUNTER — HOME CARE VISIT (OUTPATIENT)
Dept: HOME HEALTH SERVICES | Facility: HOME HEALTHCARE | Age: 84
End: 2024-08-02
Payer: MEDICARE

## 2024-08-02 VITALS
HEART RATE: 86 BPM | RESPIRATION RATE: 22 BRPM | TEMPERATURE: 98.3 F | DIASTOLIC BLOOD PRESSURE: 60 MMHG | SYSTOLIC BLOOD PRESSURE: 104 MMHG

## 2024-08-02 PROCEDURE — G0299 HHS/HOSPICE OF RN EA 15 MIN: HCPCS

## 2024-08-05 PROCEDURE — 10330087 HSPC SERVICE INTENSITY ADD-ON

## 2024-08-06 ENCOUNTER — HOME CARE VISIT (OUTPATIENT)
Dept: HOME HOSPICE | Facility: HOSPICE | Age: 84
End: 2024-08-06
Payer: MEDICARE

## 2024-08-14 ENCOUNTER — HOME CARE VISIT (OUTPATIENT)
Dept: HOME HOSPICE | Facility: HOSPICE | Age: 84
End: 2024-08-14
Payer: MEDICARE

## 2024-08-14 NOTE — CASE COMMUNICATION
"Thomas Chase, Bereavement Final IDG 24 (2MR, 1LR) Due: 24   : 1940  SOC: 24  DOD: 24  Diagnosis: PCM, Dementia  Primary: Daughter - Allegra Guzman was an 83-year-old man at [facility]. Wife of 64 years is Gina. They have three children. Son Oni and daughter Allegra and Claudine. 8 grandchildren. 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 All family members strongly believed they made the best cook ce having Thomas at [facility]. \"We gave it everything we had to keep him home and we're at peace knowing that\".  Family did hope he would get better. Gina stated, \"I will get him better\" and \"He needs antibiotics and he will get well - I want him back.\"  Family did not believe Thomas would decline at a rapid pace. Family is very involved and often visit/check in on their mother. Oni stated she has a lot of support. Ethan Dinh is a member of [facility]'s senior management. Thomas was Bahai. Affiliated with [facility] in [place]. He received SOS.    TOD: Facility pronounced. Gina was at bedside and declined support services.    CC: Johnny made a condolence call to Allegra. She put the call on speakerphone with her mother. They reported \"doing okay.\" They were reviewing wedding photos.     CC: Nurse made a condolence call  to Allegra who voiced thanks for hospice.    Call Assignments:  JOE Jackman to reassess wife Gina Chase and daughter Allegra Chase at . Please request Allegra's address for bereavement follow-up, as able. (Due: 24)  JOE Turner to reassess son Oni Chase at . Please request his address for bereavement follow-up, as able. (Due: 24)  "

## 2024-12-26 NOTE — PLAN OF CARE
Problem: Potential for Falls  Goal: Patient will remain free of falls  Description: INTERVENTIONS:  - Educate patient/family on patient safety including physical limitations  - Instruct patient to call for assistance with activity   - Consult OT/PT to assist with strengthening/mobility   - Keep Call bell within reach  - Keep bed low and locked with side rails adjusted as appropriate  - Keep care items and personal belongings within reach  - Initiate and maintain comfort rounds  - Make Fall Risk Sign visible to staff  - Apply yellow socks and bracelet for high fall risk patients  - Consider moving patient to room near nurses station  Outcome: Progressing     Problem: MOBILITY - ADULT  Goal: Maintain or return to baseline ADL function  Description: INTERVENTIONS:  -  Assess patient's ability to carry out ADLs; assess patient's baseline for ADL function and identify physical deficits which impact ability to perform ADLs (bathing, care of mouth/teeth, toileting, grooming, dressing, etc )  - Assess/evaluate cause of self-care deficits   - Assess range of motion  - Assess patient's mobility; develop plan if impaired  - Assess patient's need for assistive devices and provide as appropriate  - Encourage maximum independence but intervene and supervise when necessary  - Involve family in performance of ADLs  - Assess for home care needs following discharge   - Consider OT consult to assist with ADL evaluation and planning for discharge  - Provide patient education as appropriate  Outcome: Progressing  Goal: Maintains/Returns to pre admission functional level  Description: INTERVENTIONS:  - Perform BMAT or MOVE assessment daily    - Set and communicate daily mobility goal to care team and patient/family/caregiver     - Collaborate with rehabilitation services on mobility goals if consulted  - Out of bed for toileting  - Record patient progress and toleration of activity level   Outcome: Progressing     Problem: Prexisting or High Potential for Compromised Skin Integrity  Goal: Skin integrity is maintained or improved  Description: INTERVENTIONS:  - Identify patients at risk for skin breakdown  - Assess and monitor skin integrity  - Assess and monitor nutrition and hydration status  - Monitor labs   - Assess for incontinence   - Turn and reposition patient  - Assist with mobility/ambulation  - Relieve pressure over bony prominences  - Avoid friction and shearing  - Provide appropriate hygiene as needed including keeping skin clean and dry  - Evaluate need for skin moisturizer/barrier cream  - Collaborate with interdisciplinary team   - Patient/family teaching  - Consider wound care consult   Outcome: Progressing     Problem: Nutrition/Hydration-ADULT  Goal: Nutrient/Hydration intake appropriate for improving, restoring or maintaining nutritional needs  Description: Monitor and assess patient's nutrition/hydration status for malnutrition  Collaborate with interdisciplinary team and initiate plan and interventions as ordered  Monitor patient's weight and dietary intake as ordered or per policy  Utilize nutrition screening tool and intervene as necessary  Determine patient's food preferences and provide high-protein, high-caloric foods as appropriate       INTERVENTIONS:  - Monitor oral intake, urinary output, labs, and treatment plans  - Assess nutrition and hydration status and recommend course of action  - Evaluate amount of meals eaten  - Assist patient with eating if necessary   - Allow adequate time for meals  - Recommend/ encourage appropriate diets, oral nutritional supplements, and vitamin/mineral supplements  - Order, calculate, and assess calorie counts as needed  - Recommend, monitor, and adjust tube feedings and TPN/PPN based on assessed needs  - Assess need for intravenous fluids  - Provide specific nutrition/hydration education as appropriate  - Include patient/family/caregiver in decisions related to nutrition  Outcome: Progressing     Problem: NEUROSENSORY - ADULT  Goal: Achieves stable or improved neurological status  Description: INTERVENTIONS  - Monitor and report changes in neurological status  - Monitor vital signs such as temperature, blood pressure, glucose, and any other labs ordered   - Initiate measures to prevent increased intracranial pressure  - Monitor for seizure activity and implement precautions if appropriate      Outcome: Progressing  Goal: Achieves maximal functionality and self care  Description: INTERVENTIONS  - Monitor swallowing and airway patency with patient fatigue and changes in neurological status  - Encourage and assist patient to increase activity and self care     - Encourage visually impaired, hearing impaired and aphasic patients to use assistive/communication devices  Outcome: Progressing     Problem: CARDIOVASCULAR - ADULT  Goal: Maintains optimal cardiac output and hemodynamic stability  Description: INTERVENTIONS:  - Monitor I/O, vital signs and rhythm  - Monitor for S/S and trends of decreased cardiac output  - Administer and titrate ordered vasoactive medications to optimize hemodynamic stability  - Assess quality of pulses, skin color and temperature  - Assess for signs of decreased coronary artery perfusion  - Instruct patient to report change in severity of symptoms  Outcome: Progressing  Goal: Absence of cardiac dysrhythmias or at baseline rhythm  Description: INTERVENTIONS:  - Continuous cardiac monitoring, vital signs, obtain 12 lead EKG if ordered  - Administer antiarrhythmic and heart rate control medications as ordered  - Monitor electrolytes and administer replacement therapy as ordered  Outcome: Progressing     Problem: RESPIRATORY - ADULT  Goal: Achieves optimal ventilation and oxygenation  Description: INTERVENTIONS:  - Assess for changes in respiratory status  - Assess for changes in mentation and behavior  - Position to facilitate oxygenation and minimize respiratory effort  - Oxygen administered by appropriate delivery if ordered  - Initiate smoking cessation education as indicated  - Encourage broncho-pulmonary hygiene including cough, deep breathe, Incentive Spirometry  - Assess the need for suctioning and aspirate as needed  - Assess and instruct to report SOB or any respiratory difficulty  - Respiratory Therapy support as indicated  Outcome: Progressing     Problem: GASTROINTESTINAL - ADULT  Goal: Maintains or returns to baseline bowel function  Description: INTERVENTIONS:  - Assess bowel function  - Encourage oral fluids to ensure adequate hydration  - Administer IV fluids if ordered to ensure adequate hydration  - Administer ordered medications as needed  - Encourage mobilization and activity  - Consider nutritional services referral to assist patient with adequate nutrition and appropriate food choices  Outcome: Progressing  Goal: Maintains adequate nutritional intake  Description: INTERVENTIONS:  - Monitor percentage of each meal consumed  - Identify factors contributing to decreased intake, treat as appropriate  - Assist with meals as needed  - Monitor I&O, weight, and lab values if indicated  - Obtain nutrition services referral as needed  Outcome: Progressing     Problem: GENITOURINARY - ADULT  Goal: Maintains or returns to baseline urinary function  Description: INTERVENTIONS:  - Assess urinary function  - Encourage oral fluids to ensure adequate hydration if ordered  - Administer IV fluids as ordered to ensure adequate hydration  - Administer ordered medications as needed  - Offer frequent toileting  - Follow urinary retention protocol if ordered  Outcome: Progressing  Goal: Absence of urinary retention  Description: INTERVENTIONS:  - Assess patients ability to void and empty bladder  - Monitor I/O  - Bladder scan as needed  - Discuss with physician/AP medications to alleviate retention as needed  - Discuss catheterization for long term situations as appropriate  Outcome: Progressing     Problem: METABOLIC, FLUID AND ELECTROLYTES - ADULT  Goal: Electrolytes maintained within normal limits  Description: INTERVENTIONS:  - Monitor labs and assess patient for signs and symptoms of electrolyte imbalances  - Administer electrolyte replacement as ordered  - Monitor response to electrolyte replacements, including repeat lab results as appropriate  - Instruct patient on fluid and nutrition as appropriate  Outcome: Progressing  Goal: Fluid balance maintained  Description: INTERVENTIONS:  - Monitor labs   - Monitor I/O and WT  - Instruct patient on fluid and nutrition as appropriate  - Assess for signs & symptoms of volume excess or deficit  Outcome: Progressing     Problem: SKIN/TISSUE INTEGRITY - ADULT  Goal: Skin Integrity remains intact(Skin Breakdown Prevention)  Description: Assess:  -Assess extremities for adequate circulation and sensation     Bed Management:  -Have minimal linens on bed & keep smooth, unwrinkled  -Change linens as needed when moist or perspiring  -Avoid sitting or lying in one position     Toileting:  -Offer bedside commode  -Assess for incontinence   -Use incontinent care products after each incontinent episode   Activity:  -Mobilize patient 3 times a day  -Encourage activity and walks on unit  -Encourage or provide ROM exercises     Skin Care:  -Avoid use of baby powder, tape, friction and shearing, hot water or constrictive clothing  -Relieve pressure over bony prominences   -Do not massage red bony areas    Next Steps:  -Teach patient strategies to minimize risks   -Consider consults to  interdisciplinary teams   Outcome: Progressing  Goal: Incision(s), wounds(s) or drain site(s) healing without S/S of infection  Description: INTERVENTIONS  - Assess and document dressing, incision, wound bed, drain sites and surrounding tissue  - Provide patient and family education  - Perform skin care/dressing changes every   Outcome: Progressing     Problem: HEMATOLOGIC - ADULT  Goal: Maintains hematologic stability  Description: INTERVENTIONS  - Assess for signs and symptoms of bleeding or hemorrhage  - Monitor labs  - Administer supportive blood products/factors as ordered and appropriate  Outcome: Progressing     Problem: MUSCULOSKELETAL - ADULT  Goal: Maintain or return mobility to safest level of function  Description: INTERVENTIONS:  - Assess patient's ability to carry out ADLs; assess patient's baseline for ADL function and identify physical deficits which impact ability to perform ADLs (bathing, care of mouth/teeth, toileting, grooming, dressing, etc )  - Assess/evaluate cause of self-care deficits   - Assess range of motion  - Assess patient's mobility  - Assess patient's need for assistive devices and provide as appropriate  - Encourage maximum independence but intervene and supervise when necessary  - Involve family in performance of ADLs  - Assess for home care needs following discharge   - Consider OT consult to assist with ADL evaluation and planning for discharge  - Provide patient education as appropriate  Outcome: Progressing     Problem: COPING  Goal: Pt/Family able to verbalize concerns and demonstrate effective coping strategies  Description: INTERVENTIONS:  - Assist patient/family to identify coping skills, available support systems and cultural and spiritual values  - Provide emotional support, including active listening and acknowledgement of concerns of patient and caregivers  - Reduce environmental stimuli, as able  - Provide patient education  - Assess for spiritual pain/suffering and initiate spiritual care, including notification of Pastoral Care or [place] based community as needed  - Assess effectiveness of coping strategies  Outcome: Progressing  Goal: Will report anxiety at manageable levels  Description: INTERVENTIONS:  - Administer medication as ordered  - Teach and encourage coping skills  - Provide emotional support  - Assess patient/family for anxiety and ability to cope  Outcome: Progressing     Problem: PAIN - ADULT  Goal: Verbalizes/displays adequate comfort level or baseline comfort level  Description: Interventions:  - Encourage patient to monitor pain and request assistance  - Assess pain using appropriate pain scale  - Administer analgesics based on type and severity of pain and evaluate response  - Implement non-pharmacological measures as appropriate and evaluate response  - Consider cultural and social influences on pain and pain management  - Notify physician/advanced practitioner if interventions unsuccessful or patient reports new pain  Outcome: Progressing     Problem: INFECTION - ADULT  Goal: Absence or prevention of progression during hospitalization  Description: INTERVENTIONS:  - Assess and monitor for signs and symptoms of infection  - Monitor lab/diagnostic results  - Monitor all insertion sites, i e  indwelling lines, tubes, and drains  - Monitor endotracheal if appropriate and nasal secretions for changes in amount and color  - [place] appropriate cooling/warming therapies per order  - Administer medications as ordered  - Instruct and encourage patient and family to use good hand hygiene technique  - Identify and instruct in appropriate isolation precautions for identified infection/condition  Outcome: Progressing     Problem: SAFETY ADULT  Goal: Patient will remain free of falls  Description: INTERVENTIONS:  - Educate patient/family on patient safety including physical limitations  - Instruct patient to call for assistance with activity   - Consult OT/PT to assist with strengthening/mobility   - Keep Call bell within reach  - Keep bed low and locked with side rails adjusted as appropriate  - Keep care items and personal belongings within reach  - Initiate and maintain comfort rounds  - Make Fall Risk Sign visible to staff  - Apply yellow socks and bracelet for high fall risk patients  - Consider moving patient to room near nurses station  Outcome: Progressing  Goal: Maintain or return to baseline ADL function  Description: INTERVENTIONS:  -  Assess patient's ability to carry out ADLs; assess patient's baseline for ADL function and identify physical deficits which impact ability to perform ADLs (bathing, care of mouth/teeth, toileting, grooming, dressing, etc )  - Assess/evaluate cause of self-care deficits   - Assess range of motion  - Assess patient's mobility; develop plan if impaired  - Assess patient's need for assistive devices and provide as appropriate  - Encourage maximum independence but intervene and supervise when necessary  - Involve family in performance of ADLs  - Assess for home care needs following discharge   - Consider OT consult to assist with ADL evaluation and planning for discharge  - Provide patient education as appropriate  Outcome: Progressing  Goal: Maintains/Returns to pre admission functional level  Description: INTERVENTIONS:  - Perform BMAT or MOVE assessment daily    - Set and communicate daily mobility goal to care team and patient/family/caregiver     - Collaborate with rehabilitation services on mobility goals if consulted  - Out of bed for toileting  - Record patient progress and toleration of activity level   Outcome: Progressing     Problem: DISCHARGE PLANNING  Goal: Discharge to home or other facility with appropriate resources  Description: INTERVENTIONS:  - Identify barriers to discharge w/patient and caregiver  - Arrange for needed discharge resources and transportation as appropriate  - Identify discharge learning needs (meds, wound care, etc )  - Arrange for interpretive services to assist at discharge as needed  - Refer to Case Management Department for coordinating discharge planning if the patient needs post-hospital services based on physician/advanced practitioner order or complex needs related to functional status, cognitive ability, or social support system  Outcome: Progressing     Problem: Detail Level: Detailed Knowledge Deficit  Goal: Patient/family/caregiver demonstrates understanding of disease process, treatment plan, medications, and discharge instructions  Description: Complete learning assessment and assess knowledge base    Interventions:  - Provide teaching at level of understanding  - Provide teaching via preferred learning methods  Outcome: Progressing Depth Of Biopsy: dermis Was A Bandage Applied: Yes Size Of Lesion In Cm: 0 Biopsy Type: H and E Biopsy Method: Personna blade Anesthesia Type: 1% lidocaine with epinephrine and a 1:10 solution of 8.4% sodium bicarbonate Anesthesia Volume In Cc: 1 Hemostasis: Drysol Wound Care: Vaseline Dressing: Band-Aid Destruction After The Procedure: No Type Of Destruction Used: Electrodesiccation and Curettage Curettage Text: The wound bed was treated with curettage after the biopsy was done. Cryotherapy Text: The wound bed was treated with cryotherapy after the biopsy was performed. Electrodesiccation Text: The wound bed was treated with electrodesiccation after the biopsy was performed. Electrodesiccation And Curettage Text: The wound bed was treated with electrodesiccation and curettage after the biopsy was performed. Silver Nitrate Text: The wound bed was treated with silver nitrate after the biopsy was performed. Lab: -[id number] Medical Necessity Information: It is in your best interest to select a reason for this procedure from the list below. All of these items fulfill various CMS LCD requirements except the new and changing color options. Consent: Written consent was obtained and risks were reviewed including but not limited to scarring, infection, bleeding, scabbing, incomplete removal, nerve damage and allergy to anesthesia. Post-Care Instructions: I reviewed with the patient in detail post-care instructions. Patient is to keep the biopsy site dry overnight, and then apply Polysporin twice daily until healed. Patient may apply hydrogen peroxide soaks to remove any crusting. Notification Instructions: Patient will be notified of biopsy results. However, patient instructed to call the office if not contacted within 2 weeks. Billing Type: Third-Party Bill Information: Selecting Yes will display possible errors in your note based on the variables you have selected. This validation is only offered as a suggestion for you. PLEASE NOTE THAT THE VALIDATION TEXT WILL BE REMOVED WHEN YOU FINALIZE YOUR NOTE. IF YOU WANT TO FAX A PRELIMINARY NOTE YOU WILL NEED TO TOGGLE THIS TO 'NO' IF YOU DO NOT WANT IT IN YOUR FAXED NOTE.

## 2025-03-25 NOTE — PROGRESS NOTES
"   03/21/24 1230   Pain Assessment   Pain Assessment Tool 0-10   Pain Score No Pain   Restrictions/Precautions   Precautions Aspiration;Bed/chair alarms;Cognitive;Fall Risk;[place];Hard of hearing;Impulsive;Supervision on toilet/commode   Weight Bearing Restrictions No   ROM Restrictions No   Cognitive Linguisitic Assessments   Cognitive Linquistic Quick Test (CLQT) BCAT pt with 8/21   Comprehension   Assist Devices Hearing Aid   QI: Comprehension 2. Sometimes Understands: Understands only basic conversations or simple, direct phrases. Frequently requires cues to understand   Comprehension (FIM) 4 - Understands basic info/conversation 75-90% of time   Expression   Non-Verbal Basic   QI: Expression 2. Frequently exhibits difficulty with expressing needs and ideas   Expression (FIM) 3 - Expresses basic info/needs 50-74% of time   Social Interaction   Social Interaction (FIM) 5 - Interacts appropriately with others 90% of time   Problem Solving   Problem solving (FIM) 3 - Solves basic problmes 50-74% of time   Memory   Memory (FIM) 3 - Recognizes, recalls/performs 50-74%   Memory Skills   Orientation Level Oriented to person;Oriented to place   Speech/Language/Cognition Assessmetn   Treatment Assessment Inconsistent direction following with neurologist by visual alone, pt required written and rehearsed cues to complete basic task (providing number of fingers).  Pt improved with two models.  Pt with minimal overall participation in speech and language, pt does report \"enough\" during meal but proceeds to agree to 16 additional puree tsp trials and NTL cup sip.  Pt with a lot of coughing today, congested vocal quality.  Required cues for strategies previously demonstrated.   Consistencies Assessed and Performance   Materials Admnistered Nectar thick liquid;Puree/Level 1   Oral Stage Mild impaired   Phargngeal Stage Moderate impaired   Swallow Mechanics Mild delayed;Swallow initation   Strategies and Efficacy small bites, " ----- Message from Jil Dobbs sent at 3/24/2025  5:14 PM EDT -----  Regarding: TCM Appt    The patient is scheduled for hospital follow up on 4/8/25. This encounter does not qualify for TCM billing.    Can you please contact pt to schedule hosp follow up within 13 days of discharge.   This patient was discharged from:  [facility]  Discharge diagnosis:   acute pulmonary embolism; acute left lower extremity deep vein thrombosis; syncopal event suspected related to hypotension without recurrence; dehydration; non-small cell lung cancer with mets;   Date of discharge:      3/23/25  Patient would need to be seen by 4/5/25 to qualify for TCM.     Thank you   small sips, slow rate   Recommendations   Risk for Aspiration Present   Recommendations Consider oral diet   Diet Solid Recommendation Level 1 Dysphagia/pureed   Diet Liquid Recommendation Nectar thick liquid   Recommended Form of Meds Crushed;With puree   General Precautions Upright as possible for all oral intake   Compensatory Swallowing Strategies Alternate solids and liquids   Results Reviewed with PT/Family/Caregiver   Eating   Type of Assistance Needed Supervision   Physical Assistance Level No physical assistance   Comment coughing after PO   Eating CARE Score 4   Swallow Assessment   Swallow Treatment Assessment On call with neurologist at family's request, assisted in providing dysphagia hx and current diet.  We discussed that patient demonstrates days of tolerance and days of intolerance for puree/NTL diet.  Pt with good overall function Monday and Tuesday this week but yesterday (Wednesday) and again today pt demonstrates difficulty.  Neurologist discussed need for hydration and considerations for PEG initiated by neurologist in conversation today.  Recommendations for family to consider alternative means.  Upon ending call with neurology, family and clinician discussing in detail the various types of feeding tubes (NG vs PEG vs GJ), swallow function and history as well as ongoing education about decreased progress.  Pt's fluids were measured earlier in the week, pt recorded 960 mL.  Pt with possible dehydration related hypotension this morning.  Family expressed their gratitude for clinician's time, verbalized their questions were answered to their satisfaction.  Dr Lugo updated via TT for context of conversation.  At end of conversation pt's daughter and pt's spouse verbalizing they will likely pursue PEG tube.  Encouraged to reach out with questions.   SLP Therapy Minutes   SLP Time In 1230   SLP Time Out 1430   SLP Total Time (minutes) 120   SLP Mode of treatment - Individual (minutes) 120   SLP  Mode of treatment - Concurrent (minutes) 0   SLP Mode of treatment - Group (minutes) 0   SLP Mode of treatment - Co-treat (minutes) 0   SLP Mode of Treatment - Total time(minutes) 120 minutes   SLP Cumulative Minutes 1110   Therapy Time missed   Time missed? No

## (undated) DEVICE — PLUMEPEN PRO 10FT

## (undated) DEVICE — TOOL MR8-7BA20DL MR8 7CM BALL D L 2MM: Brand: MIDAS REX MR8

## (undated) DEVICE — SUT NUROLON 4-0 TF CR/8 18 IN C584D

## (undated) DEVICE — BETHLEHEM UNIVERSAL  MIONR EXT: Brand: CARDINAL HEALTH

## (undated) DEVICE — PROXIMATE PLUS MD MULTI-DIRECTIONAL RELEASE SKIN STAPLERS CONTAINS 35 STAINLESS STEEL STAPLES APPROXIMATE CLOSED DIMENSIONS: 6.9MM X 3.9MM WIDE: Brand: PROXIMATE

## (undated) DEVICE — DRAPE CRANI

## (undated) DEVICE — DRAPE INTESTINAL ISOLATION BAG

## (undated) DEVICE — IV CATH INTROCAN 18G X 1 1/4 SAFETY

## (undated) DEVICE — PROXIMATE SKIN STAPLERS (35 WIDE) CONTAINS 35 STAINLESS STEEL STAPLES (FIXED HEAD): Brand: PROXIMATE

## (undated) DEVICE — LIGHT GLOVE GREEN

## (undated) DEVICE — INTENDED FOR TISSUE SEPARATION, AND OTHER PROCEDURES THAT REQUIRE A SHARP SURGICAL BLADE TO PUNCTURE OR CUT.: Brand: BARD-PARKER ® CARBON RIB-BACK BLADES

## (undated) DEVICE — 3M™ STERI-DRAPE™ U-DRAPE 1015: Brand: STERI-DRAPE™

## (undated) DEVICE — GLOVE SRG BIOGEL 8

## (undated) DEVICE — DRAPE SURGIKIT SADDLE BAG

## (undated) DEVICE — NEEDLE 25G X 1 1/2

## (undated) DEVICE — 3M™ IOBAN™ 2 ANTIMICROBIAL INCISE DRAPE 6650EZ: Brand: IOBAN™ 2

## (undated) DEVICE — U-DRAPE: Brand: CONVERTORS

## (undated) DEVICE — TELFA NON-ADHERENT ABSORBENT DRESSING: Brand: TELFA

## (undated) DEVICE — PACK CRANIOTOMY PBDS RF

## (undated) DEVICE — GLOVE SRG BIOGEL 6.5

## (undated) DEVICE — NEEDLE 21 G X 1

## (undated) DEVICE — SPONGE LAP 18 X 18 IN

## (undated) DEVICE — GAUZE SPONGES,16 PLY: Brand: CURITY

## (undated) DEVICE — GLOVE INDICATOR PI UNDERGLOVE SZ 8 BLUE

## (undated) DEVICE — ACE WRAP 3 IN STERILE

## (undated) DEVICE — PADDING CAST 4 IN  COTTON STRL

## (undated) DEVICE — PAD GROUNDING ADULT

## (undated) DEVICE — ACE WRAP 4 IN STERILE

## (undated) DEVICE — STERILE EAR PACK: Brand: CARDINAL HEALTH

## (undated) DEVICE — TUBING IRD800 MR8 IRRIGTION ON-DRILL 5PK: Brand: MIDAS REX MR8

## (undated) DEVICE — SUT MONOCRYL 2-0 SH 27 IN Y417H

## (undated) DEVICE — ANTIBACTERIAL VIOLET BRAIDED (POLYGLACTIN 910), SYNTHETIC ABSORBABLE SUTURE: Brand: COATED VICRYL

## (undated) DEVICE — SUT CHROMIC 3-0 SH 27 IN G122H

## (undated) DEVICE — ABDOMINAL PAD: Brand: DERMACEA

## (undated) DEVICE — IMPERVIOUS STOCKINETTE: Brand: DEROYAL

## (undated) DEVICE — BIPOLAR CORD DISP

## (undated) DEVICE — HOOK ELASTIC STAY 12MM BLUNT SNGL STRL

## (undated) DEVICE — TOOL F2/8TA23 LEGEND 8CM 2.3MM TAPER: Brand: MIDAS REX™

## (undated) DEVICE — COBAN 4 IN STERILE

## (undated) DEVICE — NEEDLE BLUNT 18 G X 1 1/2 W FILTER

## (undated) DEVICE — SURGIFOAM 7 X 12 SPONGE ABS

## (undated) DEVICE — STRETCH BANDAGE: Brand: CURITY

## (undated) DEVICE — 3000CC GUARDIAN II: Brand: GUARDIAN

## (undated) DEVICE — SUT PLAIN 5-0 PC-1 18 IN 1915G

## (undated) DEVICE — HEMOSTATIC MATRIX SURGIFLO 8ML W/THROMBIN

## (undated) DEVICE — Device

## (undated) DEVICE — BONE WAX WHITE: Brand: BONE WAX WHITE

## (undated) DEVICE — PREP SURGICAL PURPREP 26ML

## (undated) DEVICE — GLOVE INDICATOR PI UNDERGLOVE SZ 8.5 BLUE

## (undated) DEVICE — ADHESIVE SKIN HIGH VISCOSITY EXOFIN 1ML

## (undated) DEVICE — 3M™ STERI-STRIP™ REINFORCED ADHESIVE SKIN CLOSURES, R1547, 1/2 IN X 4 IN (12 MM X 100 MM), 6 STRIPS/ENVELOPE: Brand: 3M™ STERI-STRIP™

## (undated) DEVICE — TOOL MR8-7BA40 MR8 7CM BALL 4MM: Brand: MIDAS REX MR8

## (undated) DEVICE — TOOL MR8-7BA60F MR8 7CM BALL FINE 6MM: Brand: MIDAS REX MR8

## (undated) DEVICE — CHLORAPREP HI-LITE 10.5ML ORANGE

## (undated) DEVICE — SYRINGE 10ML LL

## (undated) DEVICE — SPONGE PVP SCRUB WING STERILE

## (undated) DEVICE — SUT ETHILON 3-0 INFS-1 30 IN 669H

## (undated) DEVICE — TOOL 9MH30 LEGEND 9CM 3MM MH: Brand: MIDAS REX

## (undated) DEVICE — SPLINT 4 X 15 IN FAST SET PLASTER

## (undated) DEVICE — INTENDED FOR TISSUE SEPARATION, AND OTHER PROCEDURES THAT REQUIRE A SHARP SURGICAL BLADE TO PUNCTURE OR CUT.: Brand: BARD-PARKER SAFETY BLADES SIZE 15, STERILE

## (undated) DEVICE — ICP CATH BACTISEAL EVD LRG 1.9MM X 35CM

## (undated) DEVICE — TOOL MR8-7BA15DL MR8 7CM BALL D L 1.5MM: Brand: MIDAS REX MR8

## (undated) DEVICE — PAD CAST 4 IN COTTON NON STERILE

## (undated) DEVICE — 3M™ IOBAN™ 2 ANTIMICROBIAL INCISE DRAPE 6648EZ: Brand: IOBAN™ 2

## (undated) DEVICE — TOOL MR8-7BA10DL MR8 7CM BALL D L 1MM: Brand: MIDAS REX MR8

## (undated) DEVICE — CABLE BIPOLAR DISP MEGADYNE

## (undated) DEVICE — TIBURON SPLIT SHEET: Brand: CONVERTORS

## (undated) DEVICE — TOOL MR8-7BA30D MR8 7CM BALL D 3MM: Brand: MIDAS REX MR8

## (undated) DEVICE — CHLORAPREP HI-LITE 26ML ORANGE

## (undated) DEVICE — OCCLUSIVE GAUZE STRIP,3% BISMUTH TRIBROMOPHENATE IN PETROLATUM BLEND: Brand: XEROFORM

## (undated) DEVICE — LIGHT HANDLE COVER SLEEVE DISP BLUE STELLAR

## (undated) DEVICE — NON-STERILE REUSABLE TOURNIQUET CUFF SINGLE BLADDER, DUAL PORT AND QUICK CONNECT CONNECTOR: Brand: COLOR CUFF

## (undated) DEVICE — PADDING CAST 3IN COTTON STRL

## (undated) DEVICE — DRAPE SHEET THREE QUARTER

## (undated) DEVICE — Device: Brand: IQ SYSTEM

## (undated) DEVICE — ELECTRODE 8227410 PAIRED 2 CH SET ROHS

## (undated) DEVICE — DRESSING EAR GLASSCOCK ADULT LRG

## (undated) DEVICE — DRAPE CAMERA/LASER